# Patient Record
Sex: MALE | Race: WHITE | NOT HISPANIC OR LATINO | Employment: FULL TIME | URBAN - METROPOLITAN AREA
[De-identification: names, ages, dates, MRNs, and addresses within clinical notes are randomized per-mention and may not be internally consistent; named-entity substitution may affect disease eponyms.]

---

## 2017-06-26 ENCOUNTER — OFFICE VISIT (OUTPATIENT)
Dept: SPORTS MEDICINE | Facility: CLINIC | Age: 52
End: 2017-06-26

## 2017-06-26 VITALS
SYSTOLIC BLOOD PRESSURE: 124 MMHG | DIASTOLIC BLOOD PRESSURE: 82 MMHG | WEIGHT: 245.4 LBS | HEART RATE: 59 BPM | OXYGEN SATURATION: 96 % | BODY MASS INDEX: 31.49 KG/M2 | RESPIRATION RATE: 16 BRPM | HEIGHT: 74 IN

## 2017-06-26 DIAGNOSIS — F41.8 MIXED ANXIETY DEPRESSIVE DISORDER: ICD-10-CM

## 2017-06-26 DIAGNOSIS — E78.2 MIXED HYPERLIPIDEMIA: ICD-10-CM

## 2017-06-26 DIAGNOSIS — I10 ESSENTIAL HYPERTENSION: Primary | ICD-10-CM

## 2017-06-26 PROCEDURE — 99204 OFFICE O/P NEW MOD 45 MIN: CPT | Performed by: FAMILY MEDICINE

## 2017-06-26 RX ORDER — MIRTAZAPINE 30 MG/1
30 TABLET, FILM COATED ORAL NIGHTLY
Qty: 90 TABLET | Refills: 1 | Status: CANCELLED | OUTPATIENT
Start: 2017-06-26

## 2017-06-26 RX ORDER — ATORVASTATIN CALCIUM 10 MG/1
10 TABLET, FILM COATED ORAL
COMMUNITY
Start: 2017-03-21 | End: 2017-08-07 | Stop reason: SDUPTHER

## 2017-06-26 RX ORDER — LISINOPRIL 20 MG/1
20 TABLET ORAL DAILY
Qty: 90 TABLET | Refills: 1 | Status: CANCELLED | OUTPATIENT
Start: 2017-06-26

## 2017-06-26 RX ORDER — ASPIRIN 81 MG/1
81 TABLET, CHEWABLE ORAL
COMMUNITY
Start: 2017-03-21 | End: 2017-08-07 | Stop reason: SDUPTHER

## 2017-06-26 RX ORDER — ASPIRIN 81 MG/1
81 TABLET, CHEWABLE ORAL DAILY
Qty: 90 TABLET | Refills: 1 | Status: CANCELLED | OUTPATIENT
Start: 2017-06-26

## 2017-06-26 RX ORDER — LISINOPRIL 20 MG/1
20 TABLET ORAL
COMMUNITY
Start: 2017-03-21 | End: 2017-06-26 | Stop reason: SDUPTHER

## 2017-06-26 RX ORDER — AMLODIPINE BESYLATE 10 MG/1
10 TABLET ORAL DAILY
Qty: 90 TABLET | Refills: 1 | Status: CANCELLED | OUTPATIENT
Start: 2017-06-26

## 2017-06-26 RX ORDER — AMLODIPINE BESYLATE 10 MG/1
10 TABLET ORAL DAILY
COMMUNITY
End: 2017-08-07 | Stop reason: SDUPTHER

## 2017-06-26 RX ORDER — ATORVASTATIN CALCIUM 10 MG/1
10 TABLET, FILM COATED ORAL DAILY
Qty: 90 TABLET | Refills: 1 | Status: CANCELLED | OUTPATIENT
Start: 2017-06-26

## 2017-06-26 RX ORDER — MIRTAZAPINE 30 MG/1
30 TABLET, FILM COATED ORAL NIGHTLY
COMMUNITY
End: 2017-08-07 | Stop reason: SDUPTHER

## 2017-06-26 RX ORDER — MULTIVIT-MIN/IRON FUM/FOLIC AC 7.5 MG-4
1 TABLET ORAL
COMMUNITY
End: 2018-10-10 | Stop reason: SDUPTHER

## 2017-06-26 NOTE — PROGRESS NOTES
"Flako is a 52 y.o. year old male    Chief Complaint   Patient presents with   • Establish Care     Needs a new pcp for med refill. HTN, HLD, anxiety       History of Present Illness   HPI Comments: HTN: Stable.  Had a scare back in the spring for his blood pressure shot up acutely.  Admits that he was not as compliant with his medications.  Had other medications added to his blood pressure regimen and he has been well controlled since.  Checks his blood pressure at least 3 times a week and is similar to today's readings.  Needs refills.    HLD: Persistent.  Requests refill on atorvastatin.    Anxiety, sleep disorder: Takes mirtazapine 15 mg daily at bedtime.  Also concerned about concentration and difficulty keeping focus at work.  He is a  of the [employer] in [place].  Would like to have formal psychological testing done.       I have reviewed the patient's medical history in detail and updated the computerized patient record.    Review of Systems   Constitutional: Negative for chills, fatigue and fever.   HENT: Negative for postnasal drip and sore throat.    Eyes: Negative for pain.   Respiratory: Negative for cough, chest tightness and wheezing.    Cardiovascular: Negative for chest pain.   Gastrointestinal: Negative.    Musculoskeletal: Negative for myalgias.   Skin: Negative for rash.   Neurological: Negative for numbness and headaches.   Psychiatric/Behavioral: Positive for decreased concentration and sleep disturbance.   All other systems reviewed and are negative.      /82 (BP Location: Left arm, Patient Position: Sitting, Cuff Size: Adult)  Pulse 59  Resp 16  Ht 74\" (188 cm)  Wt 245 lb 6.4 oz (111 kg)  SpO2 96%  BMI 31.51 kg/m2     Physical Exam   Constitutional: He is oriented to person, place, and time. He appears well-developed and well-nourished.   HENT:   Head: Normocephalic and atraumatic.   Right Ear: External ear normal.   Left Ear: External ear normal.   Nose: Nose normal. "   Mouth/Throat: Oropharynx is clear and moist.   Eyes: EOM are normal.   Neck: Normal range of motion.   Cardiovascular: Normal rate and regular rhythm.    Pulmonary/Chest: Effort normal and breath sounds normal.   Neurological: He is alert and oriented to person, place, and time.   Skin: Skin is warm and dry. No rash noted.   Psychiatric: He has a normal mood and affect. His behavior is normal.   Nursing note and vitals reviewed.       Diagnoses and all orders for this visit:    Essential hypertension    Mixed hyperlipidemia    Mixed anxiety depressive disorder    Other orders  -     aspirin 81 MG chewable tablet; Chew 81 mg.  -     atorvastatin (LIPITOR) 10 MG tablet; Take 10 mg by mouth.  -     Multiple Vitamins-Minerals (MULTIVITAMIN WITH MINERALS) tablet; Take 1 tablet by mouth.  -     Discontinue: lisinopril (PRINIVIL,ZESTRIL) 20 MG tablet; Take 20 mg by mouth.  -     mirtazapine (REMERON) 30 MG tablet; Take 30 mg by mouth Every Night.  -     amLODIPine (NORVASC) 10 MG tablet; Take 10 mg by mouth Daily.  -     Cancel: mirtazapine (REMERON) 30 MG tablet; Take 1 tablet by mouth Every Night.  -     Cancel: lisinopril (PRINIVIL,ZESTRIL) 20 MG tablet; Take 1 tablet by mouth Daily.  -     Cancel: atorvastatin (LIPITOR) 10 MG tablet; Take 1 tablet by mouth Daily.  -     Cancel: aspirin 81 MG chewable tablet; Chew 1 tablet Daily.  -     Cancel: amLODIPine (NORVASC) 10 MG tablet; Take 1 tablet by mouth Daily.      Chronic medical conditions are stable.  Regards to his difficulty concentrating, contact information given for [facility] to discuss testing.  He states that he is switching to a mail order pharmacy and will call back with that information for the refills.    Prior to next visit, he'll come back for fasting labs to include: CMP, lipid panel, urinalysis with culture, CBC, free and total testosterone.

## 2017-08-07 ENCOUNTER — OFFICE VISIT (OUTPATIENT)
Dept: SPORTS MEDICINE | Facility: CLINIC | Age: 52
End: 2017-08-07

## 2017-08-07 VITALS
DIASTOLIC BLOOD PRESSURE: 90 MMHG | SYSTOLIC BLOOD PRESSURE: 140 MMHG | RESPIRATION RATE: 16 BRPM | BODY MASS INDEX: 29.85 KG/M2 | TEMPERATURE: 98.8 F | OXYGEN SATURATION: 94 % | HEIGHT: 74 IN | HEART RATE: 90 BPM | WEIGHT: 232.6 LBS

## 2017-08-07 DIAGNOSIS — F41.8 MIXED ANXIETY DEPRESSIVE DISORDER: ICD-10-CM

## 2017-08-07 DIAGNOSIS — I10 ESSENTIAL HYPERTENSION: ICD-10-CM

## 2017-08-07 DIAGNOSIS — E78.2 MIXED HYPERLIPIDEMIA: ICD-10-CM

## 2017-08-07 DIAGNOSIS — J18.9 CAP (COMMUNITY ACQUIRED PNEUMONIA): Primary | ICD-10-CM

## 2017-08-07 PROCEDURE — 99214 OFFICE O/P EST MOD 30 MIN: CPT | Performed by: FAMILY MEDICINE

## 2017-08-07 RX ORDER — ATORVASTATIN CALCIUM 10 MG/1
10 TABLET, FILM COATED ORAL DAILY
Qty: 90 TABLET | Refills: 1 | Status: SHIPPED | OUTPATIENT
Start: 2017-08-07 | End: 2018-10-10

## 2017-08-07 RX ORDER — MIRTAZAPINE 30 MG/1
30 TABLET, FILM COATED ORAL NIGHTLY
Qty: 90 TABLET | Refills: 1 | Status: SHIPPED | OUTPATIENT
Start: 2017-08-07 | End: 2018-01-16

## 2017-08-07 RX ORDER — AMLODIPINE BESYLATE 10 MG/1
10 TABLET ORAL DAILY
Qty: 90 TABLET | Refills: 1 | Status: SHIPPED | OUTPATIENT
Start: 2017-08-07 | End: 2018-02-12 | Stop reason: SDUPTHER

## 2017-08-07 RX ORDER — LEVOFLOXACIN 500 MG/1
500 TABLET, FILM COATED ORAL
COMMUNITY
Start: 2017-08-05 | End: 2017-08-15

## 2017-08-07 RX ORDER — LISINOPRIL 20 MG/1
20 TABLET ORAL DAILY
Qty: 90 TABLET | Refills: 1 | Status: SHIPPED | OUTPATIENT
Start: 2017-08-07 | End: 2017-11-10 | Stop reason: SDUPTHER

## 2017-08-07 RX ORDER — ALBUTEROL SULFATE 90 UG/1
AEROSOL, METERED RESPIRATORY (INHALATION)
Qty: 6.7 G | Refills: 0 | Status: SHIPPED | OUTPATIENT
Start: 2017-08-07 | End: 2018-01-16

## 2017-08-07 RX ORDER — ASPIRIN 81 MG/1
81 TABLET, CHEWABLE ORAL DAILY
Qty: 90 TABLET | Refills: 1 | Status: SHIPPED | OUTPATIENT
Start: 2017-08-07 | End: 2018-10-10

## 2017-08-07 RX ORDER — BACLOFEN 10 MG/1
10 TABLET ORAL
COMMUNITY
Start: 2017-08-05 | End: 2017-08-07 | Stop reason: HOSPADM

## 2017-08-07 RX ORDER — OMEPRAZOLE 20 MG/1
20 CAPSULE, DELAYED RELEASE ORAL
COMMUNITY
Start: 2017-08-05 | End: 2017-08-07 | Stop reason: HOSPADM

## 2017-08-07 NOTE — PROGRESS NOTES
"Flako is a 52 y.o. year old male    Chief Complaint   Patient presents with   • Follow-up     F/U from Immediate care. Was seen Saturday at [facility] in [place] and was told he had pneumonia.        History of Present Illness   HPI Comments: CAP: Diagnosed on Saturday at [facility] in [place].  I have reviewed that encounter.  He was also complaining of hiccups at that time.  Felt Feverish and Had a Mildly Productive Cough.  He Was Prescribed Levaquin, Baclofen, Prilosec.  Has Been Taking Medications and Symptoms Have Improved Somewhat.  He Had Some Chills Last Night but Feels like His Fever Curve Is Improving.  No Shortness of Breath or Difficulty Breathing.  Increase Mucus Production with His Cough.  No Longer Having Hiccups.  No Chronic Lung Conditions.  Does Not Smoke.    Also requests refill on other medications.  States that he uses mail order [facility] pharmacy.       I have reviewed the patient's medical, family, and social history in detail and updated the computerized patient record.    Review of Systems   Constitutional: Positive for chills and fever. Negative for fatigue.   HENT: Positive for congestion. Negative for postnasal drip and sore throat.    Eyes: Negative for pain.   Respiratory: Positive for cough. Negative for chest tightness and wheezing.    Cardiovascular: Negative for chest pain.   Gastrointestinal: Negative.    Musculoskeletal: Negative for myalgias.   Skin: Negative for rash.   Neurological: Negative for numbness and headaches.   Psychiatric/Behavioral: Negative.    All other systems reviewed and are negative.      /90 (BP Location: Left arm, Patient Position: Sitting, Cuff Size: Adult)  Pulse 90  Temp 98.8 °F (37.1 °C) (Oral)   Resp 16  Ht 74\" (188 cm)  Wt 232 lb 9.6 oz (106 kg)  SpO2 94%  BMI 29.86 kg/m2     Physical Exam   Constitutional: He is oriented to person, place, and time. He appears well-developed and well-nourished.   HENT:   Head: Normocephalic and " atraumatic.   Right Ear: External ear normal.   Left Ear: External ear normal.   Nose: Nose normal.   Mouth/Throat: Oropharynx is clear and moist.   Eyes: EOM are normal.   Neck: Normal range of motion.   Cardiovascular: Normal rate and regular rhythm.    Pulmonary/Chest: Effort normal. No accessory muscle usage. No respiratory distress. He has no wheezes. He has rhonchi in the left upper field and the left middle field.   Neurological: He is alert and oriented to person, place, and time.   Skin: Skin is warm and dry. No rash noted.   Psychiatric: He has a normal mood and affect. His behavior is normal.   Nursing note and vitals reviewed.       Diagnoses and all orders for this visit:    CAP (community acquired pneumonia)  -     albuterol (PROVENTIL HFA;VENTOLIN HFA) 108 (90 BASE) MCG/ACT inhaler; 1-2 puffs every 6-8 hrs as needed for wheezing or shortness of breath    Essential hypertension  -     amLODIPine (NORVASC) 10 MG tablet; Take 1 tablet by mouth Daily.  -     lisinopril (PRINIVIL,ZESTRIL) 20 MG tablet; Take 1 tablet by mouth Daily.  -     aspirin 81 MG chewable tablet; Chew 1 tablet Daily.    Mixed anxiety depressive disorder  -     mirtazapine (REMERON) 30 MG tablet; Take 1 tablet by mouth Every Night.    Mixed hyperlipidemia  -     atorvastatin (LIPITOR) 10 MG tablet; Take 1 tablet by mouth Daily.    Other orders  -     Discontinue: baclofen (LIORESAL) 10 MG tablet; Take 10 mg by mouth.  -     levoFLOXacin (LEVAQUIN) 500 MG tablet; Take 500 mg by mouth.  -     Discontinue: omeprazole (priLOSEC) 20 MG capsule; Take 20 mg by mouth.    1.  Complete antibiotics.  Can stop other medications that were previously prescribed.  Additionally, bronchodilator added to help with resolution of symptoms.  2-4.  Refill medications as prescribed.

## 2017-09-19 DIAGNOSIS — Z00.00 PREVENTATIVE HEALTH CARE: Primary | ICD-10-CM

## 2017-11-10 DIAGNOSIS — I10 ESSENTIAL HYPERTENSION: ICD-10-CM

## 2017-11-10 RX ORDER — LISINOPRIL 20 MG/1
20 TABLET ORAL DAILY
Qty: 30 TABLET | Refills: 0 | Status: SHIPPED | OUTPATIENT
Start: 2017-11-10 | End: 2017-12-20 | Stop reason: SDUPTHER

## 2017-12-20 DIAGNOSIS — I10 ESSENTIAL HYPERTENSION: ICD-10-CM

## 2017-12-20 RX ORDER — LISINOPRIL 20 MG/1
TABLET ORAL
Qty: 30 TABLET | Refills: 0 | Status: SHIPPED | OUTPATIENT
Start: 2017-12-20 | End: 2018-01-08 | Stop reason: SDUPTHER

## 2017-12-22 ENCOUNTER — OFFICE VISIT (OUTPATIENT)
Dept: SPORTS MEDICINE | Facility: CLINIC | Age: 52
End: 2017-12-22

## 2017-12-22 VITALS
SYSTOLIC BLOOD PRESSURE: 156 MMHG | RESPIRATION RATE: 16 BRPM | DIASTOLIC BLOOD PRESSURE: 110 MMHG | BODY MASS INDEX: 31.32 KG/M2 | HEART RATE: 69 BPM | WEIGHT: 244 LBS | OXYGEN SATURATION: 97 % | HEIGHT: 74 IN | TEMPERATURE: 98.3 F

## 2017-12-22 DIAGNOSIS — I10 ESSENTIAL HYPERTENSION: ICD-10-CM

## 2017-12-22 DIAGNOSIS — R10.84 GENERALIZED ABDOMINAL PAIN: Primary | ICD-10-CM

## 2017-12-22 DIAGNOSIS — Z87.19 HISTORY OF DIVERTICULOSIS: ICD-10-CM

## 2017-12-22 LAB
ALBUMIN SERPL-MCNC: 4.1 G/DL (ref 3.5–5.2)
ALBUMIN/GLOB SERPL: 1.3 G/DL
ALP SERPL-CCNC: 64 U/L (ref 39–117)
ALT SERPL-CCNC: 27 U/L (ref 1–41)
AST SERPL-CCNC: 14 U/L (ref 1–40)
BASOPHILS # BLD AUTO: 0.04 10*3/MM3 (ref 0–0.2)
BASOPHILS NFR BLD AUTO: 0.5 % (ref 0–1.5)
BILIRUB SERPL-MCNC: 0.4 MG/DL (ref 0.1–1.2)
BUN SERPL-MCNC: 15 MG/DL (ref 6–20)
BUN/CREAT SERPL: 16.3 (ref 7–25)
CALCIUM SERPL-MCNC: 9.6 MG/DL (ref 8.6–10.5)
CHLORIDE SERPL-SCNC: 103 MMOL/L (ref 98–107)
CO2 SERPL-SCNC: 28.1 MMOL/L (ref 22–29)
CREAT SERPL-MCNC: 0.92 MG/DL (ref 0.76–1.27)
EOSINOPHIL # BLD AUTO: 0.77 10*3/MM3 (ref 0–0.7)
EOSINOPHIL NFR BLD AUTO: 9.5 % (ref 0.3–6.2)
ERYTHROCYTE [DISTWIDTH] IN BLOOD BY AUTOMATED COUNT: 13.4 % (ref 11.5–14.5)
GLOBULIN SER CALC-MCNC: 3.1 GM/DL
GLUCOSE SERPL-MCNC: 80 MG/DL (ref 65–99)
HCT VFR BLD AUTO: 46 % (ref 40.4–52.2)
HGB BLD-MCNC: 15.1 G/DL (ref 13.7–17.6)
IMM GRANULOCYTES # BLD: 0.02 10*3/MM3 (ref 0–0.03)
IMM GRANULOCYTES NFR BLD: 0.2 % (ref 0–0.5)
LIPASE SERPL-CCNC: 52 U/L (ref 13–60)
LYMPHOCYTES # BLD AUTO: 2.24 10*3/MM3 (ref 0.9–4.8)
LYMPHOCYTES NFR BLD AUTO: 27.7 % (ref 19.6–45.3)
MCH RBC QN AUTO: 28.3 PG (ref 27–32.7)
MCHC RBC AUTO-ENTMCNC: 32.8 G/DL (ref 32.6–36.4)
MCV RBC AUTO: 86.3 FL (ref 79.8–96.2)
MONOCYTES # BLD AUTO: 0.57 10*3/MM3 (ref 0.2–1.2)
MONOCYTES NFR BLD AUTO: 7.1 % (ref 5–12)
NEUTROPHILS # BLD AUTO: 4.44 10*3/MM3 (ref 1.9–8.1)
NEUTROPHILS NFR BLD AUTO: 55 % (ref 42.7–76)
PLATELET # BLD AUTO: 255 10*3/MM3 (ref 140–500)
POTASSIUM SERPL-SCNC: 4.2 MMOL/L (ref 3.5–5.2)
PROT SERPL-MCNC: 7.2 G/DL (ref 6–8.5)
RBC # BLD AUTO: 5.33 10*6/MM3 (ref 4.6–6)
SODIUM SERPL-SCNC: 145 MMOL/L (ref 136–145)
WBC # BLD AUTO: 8.08 10*3/MM3 (ref 4.5–10.7)

## 2017-12-22 PROCEDURE — 99214 OFFICE O/P EST MOD 30 MIN: CPT | Performed by: FAMILY MEDICINE

## 2017-12-22 RX ORDER — SULFAMETHOXAZOLE AND TRIMETHOPRIM 800; 160 MG/1; MG/1
1 TABLET ORAL 2 TIMES DAILY
Qty: 20 TABLET | Refills: 0 | Status: SHIPPED | OUTPATIENT
Start: 2017-12-22 | End: 2018-01-01

## 2017-12-22 NOTE — PROGRESS NOTES
"Flako is a 52 y.o. year old male    Chief Complaint   Patient presents with   • Vomiting   • Diverticulitis     can not seem to digest food / vomitting daily /        History of Present Illness   Vomiting    Associated symptoms include a fever (subjective). Pertinent negatives include no chest pain, chills, coughing, headaches or myalgias.   Diverticulitis   This is a recurrent problem. The current episode started 1 to 4 weeks ago. The problem occurs constantly. The problem has been unchanged. Associated symptoms include a fever (subjective), nausea and vomiting. Pertinent negatives include no chest pain, chills, coughing, fatigue, headaches, myalgias, numbness, rash or sore throat.     States he was Dx with diverticulosis approx 4 yrs ago. Ongoing problem. Admits he does not comply with previously recommended diet. Subjective fever. Has thinks he has had at least 4 episodes of same this year.    HTN: hasn't taken Rx today.    I have reviewed the patient's medical, family, and social history in detail and updated the computerized patient record.    Review of Systems   Constitutional: Positive for fever (subjective). Negative for chills and fatigue.   HENT: Negative for postnasal drip and sore throat.    Eyes: Negative for pain.   Respiratory: Negative for cough, chest tightness and wheezing.    Cardiovascular: Negative for chest pain.   Gastrointestinal: Positive for nausea and vomiting.   Musculoskeletal: Negative for myalgias.   Skin: Negative for rash.   Neurological: Negative for numbness and headaches.   Psychiatric/Behavioral: Negative.    All other systems reviewed and are negative.      BP (!) 156/110 (BP Location: Right arm, Patient Position: Sitting, Cuff Size: Large Adult)  Pulse 69  Temp 98.3 °F (36.8 °C) (Oral)   Resp 16  Ht 188 cm (74\")  Wt 111 kg (244 lb)  SpO2 97%  BMI 31.33 kg/m2     Physical Exam   Constitutional: He is oriented to person, place, and time. He appears well-developed and " well-nourished.   HENT:   Head: Normocephalic and atraumatic.   Right Ear: External ear normal.   Left Ear: External ear normal.   Nose: Nose normal.   Mouth/Throat: Oropharynx is clear and moist.   Eyes: EOM are normal.   Neck: Normal range of motion.   Cardiovascular: Normal rate and regular rhythm.    Pulmonary/Chest: Effort normal and breath sounds normal.   Abdominal: Soft. Normal appearance and bowel sounds are normal. There is tenderness in the left lower quadrant. There is no rigidity, no rebound and no guarding.   Neurological: He is alert and oriented to person, place, and time.   Skin: Skin is warm and dry. No rash noted.   Psychiatric: He has a normal mood and affect. His behavior is normal.   Nursing note and vitals reviewed.       1. Generalized abdominal pain    - CBC & Differential  - Comprehensive Metabolic Panel  - Lipase  - sulfamethoxazole-trimethoprim (BACTRIM DS) 800-160 MG per tablet; Take 1 tablet by mouth 2 (Two) Times a Day for 10 days.  Dispense: 20 tablet; Refill: 0    2. History of diverticulosis    - CBC & Differential  - Comprehensive Metabolic Panel  - Lipase  - sulfamethoxazole-trimethoprim (BACTRIM DS) 800-160 MG per tablet; Take 1 tablet by mouth 2 (Two) Times a Day for 10 days.  Dispense: 20 tablet; Refill: 0    3. Essential hypertension      1, 2. Mild, persistent symptoms. Afebrile, no acute abdominal findings. Blood work as ordered. Based on this, can try round of Bactrim as monotherapy. May need to f/up with gen surg for further mgmt.   3. Asymptomatic. Has been WNL at past visits when he has taken Rx. Instructed to continue current regimen.    EMR Dragon/Transcription disclaimer:    Much of this encounter note is an electronic transcription/translation of spoken language to printed text.  The electronic translation of spoken language may permit erroneous, or at times, nonsensical words or phrases to be inadvertently transcribed.  Although I have reviewed the note for such  errors some may still exist.

## 2017-12-28 ENCOUNTER — TELEPHONE (OUTPATIENT)
Dept: SPORTS MEDICINE | Facility: CLINIC | Age: 52
End: 2017-12-28

## 2018-01-01 ENCOUNTER — APPOINTMENT (OUTPATIENT)
Dept: CT IMAGING | Facility: HOSPITAL | Age: 53
End: 2018-01-01

## 2018-01-01 ENCOUNTER — HOSPITAL ENCOUNTER (EMERGENCY)
Facility: HOSPITAL | Age: 53
Discharge: HOME | End: 2018-01-01
Attending: EMERGENCY MEDICINE | Admitting: EMERGENCY MEDICINE

## 2018-01-01 VITALS
WEIGHT: 235 LBS | HEIGHT: 74 IN | HEART RATE: 61 BPM | RESPIRATION RATE: 15 BRPM | OXYGEN SATURATION: 94 % | DIASTOLIC BLOOD PRESSURE: 58 MMHG | BODY MASS INDEX: 30.16 KG/M2 | SYSTOLIC BLOOD PRESSURE: 109 MMHG | TEMPERATURE: 97.1 F

## 2018-01-01 DIAGNOSIS — K57.12 DIVERTICULITIS OF SMALL INTESTINE WITHOUT PERFORATION OR ABSCESS WITHOUT BLEEDING: Primary | ICD-10-CM

## 2018-01-01 LAB
ALBUMIN SERPL-MCNC: 4.6 G/DL (ref 3.5–5.2)
ALBUMIN/GLOB SERPL: 1.4 G/DL
ALP SERPL-CCNC: 69 U/L (ref 39–117)
ALT SERPL W P-5'-P-CCNC: 21 U/L (ref 1–41)
ANION GAP SERPL CALCULATED.3IONS-SCNC: 11.2 MMOL/L
AST SERPL-CCNC: 16 U/L (ref 1–40)
BASOPHILS # BLD AUTO: 0.02 10*3/MM3 (ref 0–0.2)
BASOPHILS NFR BLD AUTO: 0.2 % (ref 0–1.5)
BILIRUB SERPL-MCNC: 0.9 MG/DL (ref 0.1–1.2)
BILIRUB UR QL STRIP: NEGATIVE
BUN BLD-MCNC: 15 MG/DL (ref 6–20)
BUN/CREAT SERPL: 12.4 (ref 7–25)
CALCIUM SPEC-SCNC: 8.9 MG/DL (ref 8.6–10.5)
CHLORIDE SERPL-SCNC: 96 MMOL/L (ref 98–107)
CLARITY UR: CLEAR
CO2 SERPL-SCNC: 27.8 MMOL/L (ref 22–29)
COLOR UR: ABNORMAL
CREAT BLD-MCNC: 1.21 MG/DL (ref 0.76–1.27)
DEPRECATED RDW RBC AUTO: 43.5 FL (ref 37–54)
EOSINOPHIL # BLD AUTO: 0.4 10*3/MM3 (ref 0–0.7)
EOSINOPHIL NFR BLD AUTO: 4.1 % (ref 0.3–6.2)
ERYTHROCYTE [DISTWIDTH] IN BLOOD BY AUTOMATED COUNT: 13.6 % (ref 11.5–14.5)
GFR SERPL CREATININE-BSD FRML MDRD: 63 ML/MIN/1.73
GLOBULIN UR ELPH-MCNC: 3.4 GM/DL
GLUCOSE BLD-MCNC: 92 MG/DL (ref 65–99)
GLUCOSE UR STRIP-MCNC: NEGATIVE MG/DL
HCT VFR BLD AUTO: 46.3 % (ref 40.4–52.2)
HGB BLD-MCNC: 14.8 G/DL (ref 13.7–17.6)
HGB UR QL STRIP.AUTO: NEGATIVE
HOLD SPECIMEN: NORMAL
HOLD SPECIMEN: NORMAL
IMM GRANULOCYTES # BLD: 0.02 10*3/MM3 (ref 0–0.03)
IMM GRANULOCYTES NFR BLD: 0.2 % (ref 0–0.5)
KETONES UR QL STRIP: NEGATIVE
LEUKOCYTE ESTERASE UR QL STRIP.AUTO: NEGATIVE
LIPASE SERPL-CCNC: 87 U/L (ref 13–60)
LYMPHOCYTES # BLD AUTO: 1.85 10*3/MM3 (ref 0.9–4.8)
LYMPHOCYTES NFR BLD AUTO: 18.9 % (ref 19.6–45.3)
MCH RBC QN AUTO: 27.8 PG (ref 27–32.7)
MCHC RBC AUTO-ENTMCNC: 32 G/DL (ref 32.6–36.4)
MCV RBC AUTO: 87 FL (ref 79.8–96.2)
MONOCYTES # BLD AUTO: 1.04 10*3/MM3 (ref 0.2–1.2)
MONOCYTES NFR BLD AUTO: 10.6 % (ref 5–12)
NEUTROPHILS # BLD AUTO: 6.48 10*3/MM3 (ref 1.9–8.1)
NEUTROPHILS NFR BLD AUTO: 66 % (ref 42.7–76)
NITRITE UR QL STRIP: NEGATIVE
NRBC BLD MANUAL-RTO: 0 /100 WBC (ref 0–0)
PH UR STRIP.AUTO: 7.5 [PH] (ref 5–8)
PLATELET # BLD AUTO: 242 10*3/MM3 (ref 140–500)
PMV BLD AUTO: 10.1 FL (ref 6–12)
POTASSIUM BLD-SCNC: 4.1 MMOL/L (ref 3.5–5.2)
PROT SERPL-MCNC: 8 G/DL (ref 6–8.5)
PROT UR QL STRIP: ABNORMAL
RBC # BLD AUTO: 5.32 10*6/MM3 (ref 4.6–6)
SODIUM BLD-SCNC: 135 MMOL/L (ref 136–145)
SP GR UR STRIP: 1.03 (ref 1–1.03)
UROBILINOGEN UR QL STRIP: ABNORMAL
WBC NRBC COR # BLD: 9.81 10*3/MM3 (ref 4.5–10.7)
WHOLE BLOOD HOLD SPECIMEN: NORMAL
WHOLE BLOOD HOLD SPECIMEN: NORMAL

## 2018-01-01 PROCEDURE — 99284 EMERGENCY DEPT VISIT MOD MDM: CPT

## 2018-01-01 PROCEDURE — 25010000002 CEFTRIAXONE PER 250 MG: Performed by: EMERGENCY MEDICINE

## 2018-01-01 PROCEDURE — 0 IOPAMIDOL 61 % SOLUTION: Performed by: EMERGENCY MEDICINE

## 2018-01-01 PROCEDURE — 80053 COMPREHEN METABOLIC PANEL: CPT | Performed by: EMERGENCY MEDICINE

## 2018-01-01 PROCEDURE — 83690 ASSAY OF LIPASE: CPT | Performed by: EMERGENCY MEDICINE

## 2018-01-01 PROCEDURE — 25010000002 ONDANSETRON PER 1 MG: Performed by: EMERGENCY MEDICINE

## 2018-01-01 PROCEDURE — 25010000002 HYDROMORPHONE PER 4 MG: Performed by: EMERGENCY MEDICINE

## 2018-01-01 PROCEDURE — 96375 TX/PRO/DX INJ NEW DRUG ADDON: CPT

## 2018-01-01 PROCEDURE — 74177 CT ABD & PELVIS W/CONTRAST: CPT

## 2018-01-01 PROCEDURE — 36415 COLL VENOUS BLD VENIPUNCTURE: CPT

## 2018-01-01 PROCEDURE — 85025 COMPLETE CBC W/AUTO DIFF WBC: CPT | Performed by: EMERGENCY MEDICINE

## 2018-01-01 PROCEDURE — 81003 URINALYSIS AUTO W/O SCOPE: CPT | Performed by: EMERGENCY MEDICINE

## 2018-01-01 PROCEDURE — 96361 HYDRATE IV INFUSION ADD-ON: CPT

## 2018-01-01 PROCEDURE — 96365 THER/PROPH/DIAG IV INF INIT: CPT

## 2018-01-01 RX ORDER — HYDROCODONE BITARTRATE AND ACETAMINOPHEN 5; 325 MG/1; MG/1
1 TABLET ORAL EVERY 6 HOURS PRN
Qty: 12 TABLET | Refills: 0 | Status: SHIPPED | OUTPATIENT
Start: 2018-01-01 | End: 2018-01-08

## 2018-01-01 RX ORDER — METRONIDAZOLE 500 MG/1
500 TABLET ORAL ONCE
Status: COMPLETED | OUTPATIENT
Start: 2018-01-01 | End: 2018-01-01

## 2018-01-01 RX ORDER — CEPHALEXIN 500 MG/1
500 CAPSULE ORAL 3 TIMES DAILY
Qty: 21 CAPSULE | Refills: 0 | Status: SHIPPED | OUTPATIENT
Start: 2018-01-01 | End: 2018-01-08

## 2018-01-01 RX ORDER — CEFTRIAXONE SODIUM 1 G/50ML
1 INJECTION, SOLUTION INTRAVENOUS ONCE
Status: COMPLETED | OUTPATIENT
Start: 2018-01-01 | End: 2018-01-01

## 2018-01-01 RX ORDER — ONDANSETRON 2 MG/ML
4 INJECTION INTRAMUSCULAR; INTRAVENOUS ONCE
Status: COMPLETED | OUTPATIENT
Start: 2018-01-01 | End: 2018-01-01

## 2018-01-01 RX ORDER — METRONIDAZOLE 500 MG/1
500 TABLET ORAL 3 TIMES DAILY
Qty: 21 TABLET | Refills: 0 | Status: SHIPPED | OUTPATIENT
Start: 2018-01-01 | End: 2018-01-08

## 2018-01-01 RX ORDER — ONDANSETRON 4 MG/1
4 TABLET, FILM COATED ORAL EVERY 6 HOURS PRN
Qty: 12 TABLET | Refills: 0 | Status: SHIPPED | OUTPATIENT
Start: 2018-01-01 | End: 2018-01-08

## 2018-01-01 RX ORDER — SODIUM CHLORIDE 0.9 % (FLUSH) 0.9 %
10 SYRINGE (ML) INJECTION AS NEEDED
Status: DISCONTINUED | OUTPATIENT
Start: 2018-01-01 | End: 2018-01-01 | Stop reason: HOSPADM

## 2018-01-01 RX ADMIN — CEFTRIAXONE SODIUM 1 G: 1 INJECTION, SOLUTION INTRAVENOUS at 11:25

## 2018-01-01 RX ADMIN — ONDANSETRON 4 MG: 2 INJECTION INTRAMUSCULAR; INTRAVENOUS at 10:21

## 2018-01-01 RX ADMIN — IOPAMIDOL 85 ML: 612 INJECTION, SOLUTION INTRAVENOUS at 10:30

## 2018-01-01 RX ADMIN — METRONIDAZOLE 500 MG: 500 TABLET, FILM COATED ORAL at 12:25

## 2018-01-01 RX ADMIN — HYDROMORPHONE HYDROCHLORIDE 1 MG: 1 INJECTION, SOLUTION INTRAMUSCULAR; INTRAVENOUS; SUBCUTANEOUS at 10:21

## 2018-01-01 RX ADMIN — SODIUM CHLORIDE 1000 ML: 9 INJECTION, SOLUTION INTRAVENOUS at 10:23

## 2018-01-01 NOTE — DISCHARGE INSTRUCTIONS
Take medications as prescribed.  Follow-up with your doctor in 3-4 days if symptoms persist.  Return to the emergency department for worsening pain, fever, persistent vomiting, or other concern.

## 2018-01-01 NOTE — ED PROVIDER NOTES
" EMERGENCY DEPARTMENT ENCOUNTER    CHIEF COMPLAINT  Chief Complaint: Lower abd pain  History given by: Pt  History limited by: Nothing  Room Number: 17/17  PMD: SIMRAN Rodriguez Jr., DO      HPI:  Pt is a 52 y.o. male with a hx of diverticulitis who presents complaining of intermittent lower abd pain onset about two weeks ago worsening two days ago. He also complains of nausea, vomiting, bloody stool, and subjective fever but denies any other symptoms at this time. Pt was seen by his PCP 12/22/2017 for vomiting and abd pain. Pt was given a prescription for bactrim at that time.    Duration:  About two weeks  Onset: gradual  Timing: intermittent  Location: lower abd  Radiation: none  Quality: \"pain\"  Intensity/Severity: moderate  Progression: unchanged  Associated Symptoms:  nausea, vomiting, bloody stool, and fever  Aggravating Factors: none  Alleviating Factors: none  Previous Episodes: none  Treatment before arrival: Pt received no treatment PTA.    PAST MEDICAL HISTORY  Active Ambulatory Problems     Diagnosis Date Noted   • Mixed anxiety depressive disorder 12/11/2012   • Mixed hyperlipidemia 06/26/2017   • Essential hypertension 06/26/2017     Resolved Ambulatory Problems     Diagnosis Date Noted   • No Resolved Ambulatory Problems     Past Medical History:   Diagnosis Date   • Depression    • Diverticulitis    • Hyperlipidemia    • Hypertension        PAST SURGICAL HISTORY  History reviewed. No pertinent surgical history.    FAMILY HISTORY  Family History   Problem Relation Age of Onset   • No Known Problems Mother    • Stroke Father        SOCIAL HISTORY  Social History     Social History   • Marital status: Single     Spouse name: N/A   • Number of children: N/A   • Years of education: N/A     Occupational History   • Not on file.     Social History Main Topics   • Smoking status: Never Smoker   • Smokeless tobacco: Never Used   • Alcohol use No   • Drug use: No   • Sexual activity: Defer     Other Topics " Concern   • Not on file     Social History Narrative       ALLERGIES  Review of patient's allergies indicates no known allergies.    REVIEW OF SYSTEMS  Review of Systems   Constitutional: Positive for fever (subjective). Negative for activity change and appetite change.   HENT: Negative for congestion and sore throat.    Eyes: Negative.    Respiratory: Negative for cough and shortness of breath.    Cardiovascular: Negative for chest pain and leg swelling.   Gastrointestinal: Positive for abdominal pain (lower), blood in stool, nausea and vomiting. Negative for diarrhea.   Endocrine: Negative.    Genitourinary: Negative for decreased urine volume and dysuria.   Musculoskeletal: Negative for neck pain.   Skin: Negative for rash and wound.   Allergic/Immunologic: Negative.    Neurological: Negative for weakness, numbness and headaches.   Hematological: Negative.    Psychiatric/Behavioral: Negative.    All other systems reviewed and are negative.      PHYSICAL EXAM  ED Triage Vitals   Temp Heart Rate Resp BP SpO2   01/01/18 0913 01/01/18 0913 01/01/18 0913 01/01/18 0939 01/01/18 0913   95.9 °F (35.5 °C) 98 16 133/78 100 %      Temp src Heart Rate Source Patient Position BP Location FiO2 (%)   01/01/18 0913 -- 01/01/18 0939 01/01/18 0939 --   Tympanic  Lying Right arm        Physical Exam   Constitutional: He is oriented to person, place, and time and well-developed, well-nourished, and in no distress.   HENT:   Head: Normocephalic and atraumatic.   Eyes: EOM are normal. Pupils are equal, round, and reactive to light.   Neck: Normal range of motion. Neck supple.   Cardiovascular: Normal rate, regular rhythm and normal heart sounds.    Pulmonary/Chest: Effort normal and breath sounds normal. No respiratory distress.   Abdominal: Soft. There is tenderness (mild, lower). There is no rebound, no guarding and no CVA tenderness.   Musculoskeletal: Normal range of motion. He exhibits no edema.   Neurological: He is alert and  oriented to person, place, and time. He has normal sensation and normal strength.   Skin: Skin is warm and dry.   Psychiatric: Mood and affect normal.   Nursing note and vitals reviewed.      LAB RESULTS  Lab Results (last 24 hours)     Procedure Component Value Units Date/Time    Urinalysis With / Culture If Indicated - Urine, Clean Catch [[id number]]  (Abnormal) Collected:  01/01/18 0948    Specimen:  Urine from Urine, Clean Catch Updated:  01/01/18 1001     Color, UA Dark Yellow (A)     Appearance, UA Clear     pH, UA 7.5     Specific Gravity, UA 1.026     Glucose, UA Negative     Ketones, UA Negative     Bilirubin, UA Negative     Blood, UA Negative     Protein, UA Trace (A)     Leuk Esterase, UA Negative     Nitrite, UA Negative     Urobilinogen, UA 1.0 E.U./dL    Narrative:       Urine microscopic not indicated.    CBC & Differential [[id number]] Collected:  01/01/18 0949    Specimen:  Blood Updated:  01/01/18 1001    Narrative:       The following orders were created for panel order CBC & Differential.  Procedure                               Abnormality         Status                     ---------                               -----------         ------                     CBC Auto Differential[[id number]]        Abnormal            Final result                 Please view results for these tests on the individual orders.    Comprehensive Metabolic Panel [[id number]]  (Abnormal) Collected:  01/01/18 0949    Specimen:  Blood Updated:  01/01/18 1014     Glucose 92 mg/dL      BUN 15 mg/dL      Creatinine 1.21 mg/dL      Sodium 135 (L) mmol/L      Potassium 4.1 mmol/L      Chloride 96 (L) mmol/L      CO2 27.8 mmol/L      Calcium 8.9 mg/dL      Total Protein 8.0 g/dL      Albumin 4.60 g/dL      ALT (SGPT) 21 U/L      AST (SGOT) 16 U/L      Alkaline Phosphatase 69 U/L      Total Bilirubin 0.9 mg/dL      eGFR Non African Amer 63 mL/min/1.73      Globulin 3.4 gm/dL      A/G Ratio 1.4 g/dL      BUN/Creatinine Ratio 12.4      Anion Gap 11.2 mmol/L     Lipase [[id number]]  (Abnormal) Collected:  01/01/18 0949    Specimen:  Blood Updated:  01/01/18 1014     Lipase 87 (H) U/L     CBC Auto Differential [[id number]]  (Abnormal) Collected:  01/01/18 0949    Specimen:  Blood Updated:  01/01/18 1001     WBC 9.81 10*3/mm3      RBC 5.32 10*6/mm3      Hemoglobin 14.8 g/dL      Hematocrit 46.3 %      MCV 87.0 fL      MCH 27.8 pg      MCHC 32.0 (L) g/dL      RDW 13.6 %      RDW-SD 43.5 fl      MPV 10.1 fL      Platelets 242 10*3/mm3      Neutrophil % 66.0 %      Lymphocyte % 18.9 (L) %      Monocyte % 10.6 %      Eosinophil % 4.1 %      Basophil % 0.2 %      Immature Grans % 0.2 %      Neutrophils, Absolute 6.48 10*3/mm3      Lymphocytes, Absolute 1.85 10*3/mm3      Monocytes, Absolute 1.04 10*3/mm3      Eosinophils, Absolute 0.40 10*3/mm3      Basophils, Absolute 0.02 10*3/mm3      Immature Grans, Absolute 0.02 10*3/mm3      nRBC 0.0 /100 WBC           I ordered the above labs and reviewed the results    RADIOLOGY  CT Abdomen Pelvis With Contrast   Final Result           1. Acute jejunal diverticulitis with wall thickening, follow-up   recommended.   2. No obstructive uropathy.       Discussed by telephone with Dr. Webb at time of interpretation, 1026,   01/01/2018       This report was finalized on 1/1/2018 10:54 AM by Dr. Ayo Meraz MD.               I ordered the above noted radiological studies. Interpreted by radiologist. Discussed with radiologist (Dr. Meraz). Reviewed by me in PACS.       PROCEDURES  Procedures      PROGRESS AND CONSULTS  ED Course     0923 Ordered CBC, UA, Lipase, and CMP for further evaluation    1013 Ordered CT Abd/Pel for further evaluation. Ordered 4mg zofran and 1mg dilaudid for symptom relief    1059 Discussed pt with Dr. Meraz (radiologist) who informs me CT results which show diverticulosis of the sigmoid colon and acute jejunal diverticulitis but no abscess.    1102 Ordered 500mg flagyl and 1g  rocephin    1104 BP- 138/85 HR- 60 Temp- 95.9 °F (35.5 °C) (Tympanic) O2 sat- 92%. Rechecked the patient who is in NAD and is resting comfortably. Informed pt of CT results which show acute jejunal diverticulitis wall thickening and no obstruction. Will discharge after flagyl and rocephin. Will discharge with pain medication, zofran, flagyl, and keflex. Pt understands and agrees with the plan. All questions answered.    1148 BP- 124/69 HR- 53 Temp- 95.9 °F (35.5 °C) (Tympanic) O2 sat- 95%. Rechecked the patient who is in NAD and is resting comfortably.       MEDICAL DECISION MAKING  Results were reviewed/discussed with the patient and they were also made aware of online access. Pt also made aware that some labs, such as cultures, will not be resulted during ER visit and follow up with PMD is necessary.     MDM  Number of Diagnoses or Management Options  Diverticulitis of small intestine without perforation or abscess without bleeding:   Diagnosis management comments: CT scan showed diverticulitis of the jejunum.  There was no abscess or perforation.  Patient was afebrile with a normal white blood cell count.  He was well-appearing.  Patient was given IV Rocephin and oral Flagyl.  He will be treated as an outpatient and discharged with prescriptions for Flagyl, Keflex, Zofran, and Norco.  Patient was advised to follow-up with his primary care doctor the next several days.       Amount and/or Complexity of Data Reviewed  Clinical lab tests: ordered and reviewed (WBC - 9.81, Lipase - 87, Sodium - 135)  Tests in the radiology section of CPT®: ordered and reviewed (CT Abd/Pel show diverticulosis of the sigmoid colon and acute jejunal diverticulitis but no abscess.)  Discussion of test results with the performing providers: yes (Dr. Meraz (radiologist))  Decide to obtain previous medical records or to obtain history from someone other than the patient: yes  Review and summarize past medical records: yes (Pt was seen by  his PCP 12/22/2017 for vomiting and abd pain. Pt was given a prescription for bactrim at that time.)  Independent visualization of images, tracings, or specimens: yes    Patient Progress  Patient progress: stable         DIAGNOSIS  Final diagnoses:   Diverticulitis of small intestine without perforation or abscess without bleeding       DISPOSITION  DISCHARGE    Patient discharged in stable condition.    Reviewed implications of results, diagnosis, meds, responsibility to follow up, warning signs and symptoms of possible worsening, potential complications and reasons to return to ER, including new or worsening symptoms.    Patient/Family voiced understanding of above instructions.    Discussed plan for discharge, as there is no emergent indication for admission.  Pt/family is agreeable and understands need for follow up and repeat testing.  Pt is aware that discharge does not mean that nothing is wrong but it indicates no emergency is present that requires admission and they must continue care with follow-up as given below or physician of their choice.     FOLLOW-UP  Akash Rodriguez Jr., DO  [address]  [place] [address]  [phone]    Call in 3 days  If symptoms persist         Medication List      New Prescriptions          cephalexin 500 MG capsule   Commonly known as:  KEFLEX   Take 1 capsule by mouth 3 (Three) Times a Day.       HYDROcodone-acetaminophen 5-325 MG per tablet   Commonly known as:  NORCO   Take 1 tablet by mouth Every 6 (Six) Hours As Needed for Moderate Pain .       metroNIDAZOLE 500 MG tablet   Commonly known as:  FLAGYL   Take 1 tablet by mouth 3 (Three) Times a Day.       ondansetron 4 MG tablet   Commonly known as:  ZOFRAN   Take 1 tablet by mouth Every 6 (Six) Hours As Needed for Nausea or   Vomiting.         Stop          albuterol 108 (90 Base) MCG/ACT inhaler   Commonly known as:  PROVENTIL HFA;VENTOLIN HFA       mirtazapine 30 MG tablet   Commonly known as:   REMERON       sulfamethoxazole-trimethoprim 800-160 MG per tablet   Commonly known as:  BACTRIM DS               Latest Documented Vital Signs:  As of 11:51 AM  BP- 124/69 HR- 53 Temp- 95.9 °F (35.5 °C) (Tympanic) O2 sat- 95%    --    NILE query complete. Treatment plan to include limited course of prescribed  controlled substance. Risks including addiction, benefits, and alternatives presented to patient.     Documentation assistance provided by cam Kaplan for Dr. Webb.  Information recorded by the cam was done at my direction and has been verified and validated by me.     Arjun Kaplan  01/01/18 4507       Akash Webb MD  01/01/18 6517

## 2018-01-01 NOTE — ED TRIAGE NOTES
Pt states that he has hx of diverticulitis. Pt states that he has been seeing GI MD but GI MD is closed today.

## 2018-01-02 ENCOUNTER — TELEPHONE (OUTPATIENT)
Dept: SPORTS MEDICINE | Facility: CLINIC | Age: 53
End: 2018-01-02

## 2018-01-02 DIAGNOSIS — K57.32 DIVERTICULITIS OF LARGE INTESTINE WITHOUT PERFORATION OR ABSCESS WITHOUT BLEEDING: Primary | ICD-10-CM

## 2018-01-02 NOTE — TELEPHONE ENCOUNTER
HE ASKS THAT A REFERRAL IS PUT IN TO A SPECIALIST AND HE CANT CONTINUE TO WAIT, HE IS GOING TO LOSE HIS JOB IF HE DOESN'T GET BACK TO WORK

## 2018-01-02 NOTE — TELEPHONE ENCOUNTER
I reviewed ER visit. He should follow up with me if symptoms persist after completing antibiotics they prescribed.

## 2018-01-02 NOTE — TELEPHONE ENCOUNTER
HE WAS SEEN IN THE ER BECAUSE PAIN WAS SO BAD, WANTS TO KNOW IF HE NEEDS TO BE SEEN BY YOU OR IF YOU BELIEVE HE SHOULD BE REFERRED OUT DEPENDENT ON THE ER VISIT

## 2018-01-08 ENCOUNTER — OFFICE VISIT (OUTPATIENT)
Dept: SPORTS MEDICINE | Facility: CLINIC | Age: 53
End: 2018-01-08

## 2018-01-08 VITALS
WEIGHT: 243 LBS | HEIGHT: 74 IN | DIASTOLIC BLOOD PRESSURE: 90 MMHG | TEMPERATURE: 98.8 F | OXYGEN SATURATION: 95 % | HEART RATE: 65 BPM | SYSTOLIC BLOOD PRESSURE: 124 MMHG | BODY MASS INDEX: 31.18 KG/M2

## 2018-01-08 DIAGNOSIS — I10 ESSENTIAL HYPERTENSION: ICD-10-CM

## 2018-01-08 DIAGNOSIS — K57.32 DIVERTICULITIS OF LARGE INTESTINE WITHOUT PERFORATION OR ABSCESS WITHOUT BLEEDING: Primary | ICD-10-CM

## 2018-01-08 DIAGNOSIS — R10.84 GENERALIZED ABDOMINAL PAIN: ICD-10-CM

## 2018-01-08 PROCEDURE — 99214 OFFICE O/P EST MOD 30 MIN: CPT | Performed by: FAMILY MEDICINE

## 2018-01-08 RX ORDER — LISINOPRIL 20 MG/1
TABLET ORAL
Qty: 30 TABLET | Refills: 0 | Status: SHIPPED | OUTPATIENT
Start: 2018-01-08 | End: 2018-01-31 | Stop reason: SDUPTHER

## 2018-01-08 RX ORDER — ONDANSETRON 4 MG/1
4 TABLET, FILM COATED ORAL EVERY 6 HOURS PRN
Qty: 20 TABLET | Refills: 0 | Status: SHIPPED | OUTPATIENT
Start: 2018-01-08 | End: 2018-01-16

## 2018-01-08 RX ORDER — METRONIDAZOLE 500 MG/1
500 TABLET ORAL 3 TIMES DAILY
Qty: 21 TABLET | Refills: 0 | Status: SHIPPED | OUTPATIENT
Start: 2018-01-08 | End: 2018-01-15

## 2018-01-08 RX ORDER — CEPHALEXIN 500 MG/1
500 CAPSULE ORAL 3 TIMES DAILY
Qty: 21 CAPSULE | Refills: 0 | Status: SHIPPED | OUTPATIENT
Start: 2018-01-08 | End: 2018-01-15

## 2018-01-08 NOTE — PROGRESS NOTES
"Flako is a 52 y.o. year old male    Chief Complaint   Patient presents with   • Diverticulitis       History of Present Illness   HPI     Diverticulitis: Ongoing.  Since last visit with me, he went to the emergency department where blood work and CAT scan were performed.  CAT scan showed acute diverticulitis.  The recommended to change his antibiotic from Bactrim for which he had only taken a few doses of to Keflex and Flagyl.  The original prescription was dated 1/1/18 in instructions were to take 3 times a day.  However, patient has been taking every 3 hours and ran out of his prescription on Friday.  He has been to see Dr. Noriega, general surgeon who has discussed EGD next week as well as likely resection of the diseased colon.  No longer having fever.  Still having bloody BMs.    I have reviewed the patient's medical, family, and social history in detail and updated the computerized patient record.    Review of Systems   Constitutional: Negative for chills, fatigue and fever.   HENT: Negative for postnasal drip and sore throat.    Eyes: Negative for pain.   Respiratory: Negative for cough, chest tightness and wheezing.    Cardiovascular: Negative for chest pain.   Gastrointestinal: Positive for abdominal pain, blood in stool and diarrhea.   Musculoskeletal: Negative for myalgias.   Skin: Negative for rash.   Neurological: Negative for numbness and headaches.   Psychiatric/Behavioral: Negative.    All other systems reviewed and are negative.      /90  Pulse 65  Temp 98.8 °F (37.1 °C)  Ht 188 cm (74\")  Wt 110 kg (243 lb)  SpO2 95%  BMI 31.2 kg/m2     Physical Exam   Constitutional: He is oriented to person, place, and time. Vital signs are normal. He appears well-developed and well-nourished.   HENT:   Head: Normocephalic and atraumatic.   Eyes: Conjunctivae and EOM are normal.   Pulmonary/Chest: No accessory muscle usage. No respiratory distress.   Abdominal: Soft. Normal appearance and bowel sounds " are normal. There is tenderness in the periumbilical area and left lower quadrant. There is no rigidity and no guarding.   Musculoskeletal: He exhibits no deformity.   Neurological: He is alert and oriented to person, place, and time.   Skin: Skin is warm and dry. No rash noted.   Psychiatric: He has a normal mood and affect. His behavior is normal.   Nursing note and vitals reviewed.    1/1/18 Blood work reviewed through EMR.  No anemia.  Mildly elevated lipase.  Metabolic panel within normal limits.    1/1/18 CT abdomen pelvis with contrast revealed acute jejunal diverticulitis with wall thickening.     Diagnoses and all orders for this visit:    Diverticulitis of large intestine without perforation or abscess without bleeding  -     ondansetron (ZOFRAN) 4 MG tablet; Take 1 tablet by mouth Every 6 (Six) Hours As Needed for Nausea or Vomiting.  -     metroNIDAZOLE (FLAGYL) 500 MG tablet; Take 1 tablet by mouth 3 (Three) Times a Day for 7 days.  -     cephalexin (KEFLEX) 500 MG capsule; Take 1 capsule by mouth 3 (Three) Times a Day for 7 days.    Generalized abdominal pain       Discussed with Flako that I think at this time, he should direct his concerns to surgeon as it does sound like surgery is in the near future.  Reviewed proper schedule of antibiotic.  He is having some bloody BMs but he had those prior to starting antibiotic.  I did send in an additional 7 day course of previous antibiotics as well as a refill on Zofran.  Proceed accordingly based on surgeon's recommendations.      EMR Dragon/Transcription disclaimer:    Much of this encounter note is an electronic transcription/translation of spoken language to printed text.  The electronic translation of spoken language may permit erroneous, or at times, nonsensical words or phrases to be inadvertently transcribed.  Although I have reviewed the note for such errors some may still exist.

## 2018-01-16 ENCOUNTER — OFFICE VISIT (OUTPATIENT)
Dept: SPORTS MEDICINE | Facility: CLINIC | Age: 53
End: 2018-01-16

## 2018-01-16 VITALS
BODY MASS INDEX: 29.65 KG/M2 | DIASTOLIC BLOOD PRESSURE: 92 MMHG | SYSTOLIC BLOOD PRESSURE: 140 MMHG | HEIGHT: 74 IN | OXYGEN SATURATION: 98 % | WEIGHT: 231 LBS | TEMPERATURE: 98.8 F | HEART RATE: 75 BPM

## 2018-01-16 DIAGNOSIS — I10 ESSENTIAL HYPERTENSION: Primary | ICD-10-CM

## 2018-01-16 DIAGNOSIS — Z90.49 STATUS POST COLON RESECTION: ICD-10-CM

## 2018-01-16 PROCEDURE — 99214 OFFICE O/P EST MOD 30 MIN: CPT | Performed by: FAMILY MEDICINE

## 2018-01-16 RX ORDER — HYDROCHLOROTHIAZIDE 25 MG/1
25 TABLET ORAL DAILY
Qty: 90 TABLET | Refills: 1 | Status: SHIPPED | OUTPATIENT
Start: 2018-01-16 | End: 2018-02-12 | Stop reason: SDUPTHER

## 2018-01-16 RX ORDER — RANITIDINE 150 MG/1
150 CAPSULE ORAL
COMMUNITY
End: 2018-10-10

## 2018-01-16 RX ORDER — HYDROCODONE BITARTRATE AND ACETAMINOPHEN 5; 325 MG/1; MG/1
1 TABLET ORAL
COMMUNITY
Start: 2018-01-01 | End: 2018-08-20

## 2018-01-16 NOTE — PROGRESS NOTES
"Flako is a 52 y.o. year old male    Chief Complaint   Patient presents with   • Hypertension     had surgery on friday and cant get b/p to go down the lowest b/p was 180/119        History of Present Illness   HPI     HTN: taking Rx as prescribed. Hospitalized 1/12/18 - 1/14/18 for small bowel resection at [facility]. I reviewed BP ranges there and they were elevated up to 160s systolic.  He states that since leaving the hospital, he has been checking his blood pressure at home on his cuff and it was as high as 180s over 120s.  Brought cuff with him today. Associates blurred vision with this.  Has been on HCTZ in the past as well and seemed to work well.  He has been staying well hydrated.    He is experiencing some constipation since leaving the hospital which could be attributed to surgery and/or his pain medication.  He has called Dr. Noriega's office for further recommendations.    I have reviewed the patient's medical, family, and social history in detail and updated the computerized patient record.    Review of Systems   Constitutional: Negative for chills, fatigue and fever.   HENT: Negative for postnasal drip and sore throat.    Eyes: Positive for visual disturbance. Negative for pain.   Respiratory: Negative for cough, chest tightness and wheezing.    Cardiovascular: Negative for chest pain.   Gastrointestinal: Positive for constipation.   Musculoskeletal: Negative for myalgias.   Skin: Negative for rash.   Neurological: Negative for numbness and headaches.   Psychiatric/Behavioral: Negative.    All other systems reviewed and are negative.      /92 (BP Location: Left arm, Patient Position: Sitting, Cuff Size: Large Adult)  Pulse 75  Temp 98.8 °F (37.1 °C) (Oral)   Ht 188 cm (74\")  Wt 105 kg (231 lb)  SpO2 98%  BMI 29.66 kg/m2     Physical Exam   Constitutional: He is oriented to person, place, and time. He appears well-developed and well-nourished.   HENT:   Head: Normocephalic and atraumatic. "   Right Ear: External ear normal.   Left Ear: External ear normal.   Nose: Nose normal.   Mouth/Throat: Oropharynx is clear and moist.   Eyes: EOM are normal.   Neck: Normal range of motion.   Cardiovascular: Normal rate and regular rhythm.    Pulmonary/Chest: Effort normal and breath sounds normal.   Abdominal: Soft. Normal appearance and bowel sounds are normal.       Neurological: He is alert and oriented to person, place, and time.   Skin: Skin is warm and dry. No rash noted.   Psychiatric: He has a normal mood and affect. His behavior is normal.   Nursing note and vitals reviewed.       Diagnoses and all orders for this visit:    Essential hypertension  -     hydrochlorothiazide (HYDRODIURIL) 25 MG tablet; Take 1 tablet by mouth Daily.    Status post colon resection    Other orders  -     HYDROcodone-acetaminophen (NORCO) 5-325 MG per tablet; Take 1 tablet by mouth.  -     ranitidine (ZANTAC) 150 MG capsule; Take 150 mg by mouth.       1. Add HCTZ. Can monitor BP at home.   2. Incision looks good. Constipation likely 2/2 opiate. Has already called surgeon's office. F/up with him next week.      EMR Dragon/Transcription disclaimer:    Much of this encounter note is an electronic transcription/translation of spoken language to printed text.  The electronic translation of spoken language may permit erroneous, or at times, nonsensical words or phrases to be inadvertently transcribed.  Although I have reviewed the note for such errors some may still exist.

## 2018-01-31 DIAGNOSIS — I10 ESSENTIAL HYPERTENSION: ICD-10-CM

## 2018-01-31 RX ORDER — LISINOPRIL 20 MG/1
TABLET ORAL
Qty: 30 TABLET | Refills: 0 | Status: SHIPPED | OUTPATIENT
Start: 2018-01-31 | End: 2018-02-12 | Stop reason: SDUPTHER

## 2018-02-12 DIAGNOSIS — I10 ESSENTIAL HYPERTENSION: ICD-10-CM

## 2018-02-12 RX ORDER — AMLODIPINE BESYLATE 10 MG/1
10 TABLET ORAL DAILY
Qty: 90 TABLET | Refills: 1 | Status: SHIPPED | OUTPATIENT
Start: 2018-02-12 | End: 2018-04-10 | Stop reason: SDUPTHER

## 2018-02-12 RX ORDER — LISINOPRIL 20 MG/1
20 TABLET ORAL DAILY
Qty: 30 TABLET | Refills: 0 | Status: SHIPPED | OUTPATIENT
Start: 2018-02-12 | End: 2018-02-14 | Stop reason: SDUPTHER

## 2018-02-12 RX ORDER — LISINOPRIL 20 MG/1
20 TABLET ORAL DAILY
Qty: 30 TABLET | Refills: 0 | Status: SHIPPED | OUTPATIENT
Start: 2018-02-12 | End: 2018-02-12 | Stop reason: SDUPTHER

## 2018-02-12 RX ORDER — HYDROCHLOROTHIAZIDE 25 MG/1
25 TABLET ORAL DAILY
Qty: 90 TABLET | Refills: 1 | Status: SHIPPED | OUTPATIENT
Start: 2018-02-12 | End: 2018-04-10 | Stop reason: SDUPTHER

## 2018-02-12 RX ORDER — AMLODIPINE BESYLATE 10 MG/1
10 TABLET ORAL DAILY
Qty: 90 TABLET | Refills: 1 | Status: SHIPPED | OUTPATIENT
Start: 2018-02-12 | End: 2019-03-02 | Stop reason: SDUPTHER

## 2018-02-12 RX ORDER — HYDROCHLOROTHIAZIDE 25 MG/1
25 TABLET ORAL DAILY
Qty: 90 TABLET | Refills: 1 | Status: SHIPPED | OUTPATIENT
Start: 2018-02-12 | End: 2018-08-06

## 2018-02-14 DIAGNOSIS — I10 ESSENTIAL HYPERTENSION: ICD-10-CM

## 2018-02-14 RX ORDER — LISINOPRIL 20 MG/1
20 TABLET ORAL DAILY
Qty: 90 TABLET | Refills: 0 | Status: SHIPPED | OUTPATIENT
Start: 2018-02-14 | End: 2018-06-02 | Stop reason: SDUPTHER

## 2018-04-10 ENCOUNTER — OFFICE VISIT (OUTPATIENT)
Dept: SPORTS MEDICINE | Facility: CLINIC | Age: 53
End: 2018-04-10

## 2018-04-10 VITALS
DIASTOLIC BLOOD PRESSURE: 72 MMHG | SYSTOLIC BLOOD PRESSURE: 124 MMHG | BODY MASS INDEX: 29.24 KG/M2 | WEIGHT: 227.8 LBS | HEIGHT: 74 IN

## 2018-04-10 DIAGNOSIS — I10 ESSENTIAL HYPERTENSION: ICD-10-CM

## 2018-04-10 DIAGNOSIS — K57.32 DIVERTICULITIS OF LARGE INTESTINE WITHOUT PERFORATION OR ABSCESS WITHOUT BLEEDING: ICD-10-CM

## 2018-04-10 DIAGNOSIS — F41.8 MIXED ANXIETY DEPRESSIVE DISORDER: Primary | ICD-10-CM

## 2018-04-10 PROCEDURE — 99214 OFFICE O/P EST MOD 30 MIN: CPT | Performed by: FAMILY MEDICINE

## 2018-04-10 RX ORDER — CITALOPRAM 10 MG/1
10 TABLET ORAL DAILY
Qty: 30 TABLET | Refills: 2 | Status: SHIPPED | OUTPATIENT
Start: 2018-04-10 | End: 2018-07-18 | Stop reason: SDUPTHER

## 2018-04-10 RX ORDER — CIPROFLOXACIN 500 MG/1
500 TABLET, FILM COATED ORAL
COMMUNITY
Start: 2018-04-02 | End: 2018-04-12

## 2018-04-10 NOTE — PROGRESS NOTES
"Flako is a 52 y.o. year old male    Chief Complaint   Patient presents with   • Follow-up     Hospital        History of Present Illness   HPI      Diverticulitis: recently hospitalized at [facility] due to flare up of symptoms. Was followed by Dr. Noriega who recently performed small bowel resection due to recurrent diverticulitis. His recent flare was thought to be a small perforation in his large intestine.  He was placed on IV antibiotics and then transitioned to oral which she is currently taking.  He has had outpatient follow-up with neurosurgeon and has another one pending in May.  Overall, symptoms are improving.  I reviewed his records through care everywhere including blood work.    Anxiety: Due to recent hospitalizations as well as family health concerns, he has been having increased anxiety as well as some depressive type symptoms.  He has racing thoughts that keep him up at night.  Has problems focusing at work due to his anxiety.  Is interested in counseling but also discussing medication.    I have reviewed the patient's medical, family, and social history in detail and updated the computerized patient record.    Review of Systems   Constitutional: Negative for chills, fatigue and fever.   HENT: Negative for congestion, rhinorrhea and sinus pressure.    Respiratory: Negative for chest tightness and shortness of breath.    Cardiovascular: Negative for chest pain.   Gastrointestinal: Negative for abdominal pain and blood in stool.   Musculoskeletal: Negative for arthralgias.   Skin: Negative for rash.   Neurological: Negative for numbness and headaches.   Psychiatric/Behavioral: Positive for sleep disturbance. The patient is nervous/anxious.    All other systems reviewed and are negative.      /72   Ht 188 cm (74\")   Wt 103 kg (227 lb 12.8 oz)   BMI 29.25 kg/m²      Physical Exam   Constitutional: He is oriented to person, place, and time. Vital signs are normal. He appears well-developed and " well-nourished.   HENT:   Head: Normocephalic and atraumatic.   Eyes: Conjunctivae and EOM are normal.   Pulmonary/Chest: No accessory muscle usage. No respiratory distress.   Abdominal: Soft. Normal appearance and bowel sounds are normal. There is no tenderness.   Surgical incision CDI   Musculoskeletal: He exhibits no deformity.   Neurological: He is alert and oriented to person, place, and time.   Skin: Skin is warm and dry. No rash noted.   Psychiatric: He has a normal mood and affect. His behavior is normal.   Nursing note and vitals reviewed.       Diagnoses and all orders for this visit:    Mixed anxiety depressive disorder  -     citalopram (CELEXA) 10 MG tablet; Take 1 tablet by mouth Daily.    Diverticulitis of large intestine without perforation or abscess without bleeding    Essential hypertension    Other orders  -     ciprofloxacin (CIPRO) 500 MG tablet; Take 500 mg by mouth.       1.  Discussed treatment options with Flako.  He is agreeable to seeking counseling and contact information was given today.  Also discussed utility of medication which she would like to only be offered short-term.  Start Celexa 10 mg daily and follow-up in 4 weeks.  2.  Resolving.  Complete antibiotics.  Outpatient follow-up with general surgery next month.  3.  Stable and at goal.    EMR Dragon/Transcription disclaimer:    Much of this encounter note is an electronic transcription/translation of spoken language to printed text.  The electronic translation of spoken language may permit erroneous, or at times, nonsensical words or phrases to be inadvertently transcribed.  Although I have reviewed the note for such errors some may still exist.

## 2018-06-02 DIAGNOSIS — I10 ESSENTIAL HYPERTENSION: ICD-10-CM

## 2018-06-04 RX ORDER — LISINOPRIL 20 MG/1
20 TABLET ORAL DAILY
Qty: 90 TABLET | Refills: 3 | Status: SHIPPED | OUTPATIENT
Start: 2018-06-04 | End: 2018-10-10 | Stop reason: SDUPTHER

## 2018-06-07 ENCOUNTER — ON CAMPUS - OUTPATIENT (OUTPATIENT)
Dept: URBAN - METROPOLITAN AREA HOSPITAL 108 | Facility: HOSPITAL | Age: 53
End: 2018-06-07
Payer: COMMERCIAL

## 2018-06-07 DIAGNOSIS — K20.8 OTHER ESOPHAGITIS: ICD-10-CM

## 2018-06-07 DIAGNOSIS — K57.10 DIVERTICULOSIS OF SMALL INTESTINE WITHOUT PERFORATION OR ABS: ICD-10-CM

## 2018-06-07 DIAGNOSIS — R93.3 ABNORMAL FINDINGS ON DIAGNOSTIC IMAGING OF OTHER PARTS OF DI: ICD-10-CM

## 2018-06-07 DIAGNOSIS — K29.70 GASTRITIS, UNSPECIFIED, WITHOUT BLEEDING: ICD-10-CM

## 2018-06-07 DIAGNOSIS — Z98.890 OTHER SPECIFIED POSTPROCEDURAL STATES: ICD-10-CM

## 2018-06-07 DIAGNOSIS — K26.9 DUODENAL ULCER, UNSPECIFIED AS ACUTE OR CHRONIC, WITHOUT HEM: ICD-10-CM

## 2018-06-07 DIAGNOSIS — K29.80 DUODENITIS WITHOUT BLEEDING: ICD-10-CM

## 2018-06-07 PROCEDURE — 44360 SMALL BOWEL ENDOSCOPY: CPT

## 2018-06-18 ENCOUNTER — INPATIENT HOSPITAL (OUTPATIENT)
Dept: URBAN - METROPOLITAN AREA HOSPITAL 107 | Facility: HOSPITAL | Age: 53
End: 2018-06-18
Payer: COMMERCIAL

## 2018-06-18 DIAGNOSIS — R10.31 RIGHT LOWER QUADRANT PAIN: ICD-10-CM

## 2018-06-18 DIAGNOSIS — R10.32 LEFT LOWER QUADRANT PAIN: ICD-10-CM

## 2018-06-18 DIAGNOSIS — R14.1 GAS PAIN: ICD-10-CM

## 2018-06-18 DIAGNOSIS — R11.2 NAUSEA WITH VOMITING, UNSPECIFIED: ICD-10-CM

## 2018-06-18 PROCEDURE — 99253 IP/OBS CNSLTJ NEW/EST LOW 45: CPT | Performed by: INTERNAL MEDICINE

## 2018-06-19 ENCOUNTER — INPATIENT HOSPITAL (OUTPATIENT)
Dept: URBAN - METROPOLITAN AREA HOSPITAL 107 | Facility: HOSPITAL | Age: 53
End: 2018-06-19
Payer: COMMERCIAL

## 2018-06-19 DIAGNOSIS — R10.31 RIGHT LOWER QUADRANT PAIN: ICD-10-CM

## 2018-06-19 DIAGNOSIS — R14.1 GAS PAIN: ICD-10-CM

## 2018-06-19 DIAGNOSIS — R10.32 LEFT LOWER QUADRANT PAIN: ICD-10-CM

## 2018-06-19 DIAGNOSIS — R11.2 NAUSEA WITH VOMITING, UNSPECIFIED: ICD-10-CM

## 2018-06-19 PROCEDURE — 99232 SBSQ HOSP IP/OBS MODERATE 35: CPT | Performed by: PHYSICIAN ASSISTANT

## 2018-06-28 ENCOUNTER — INPATIENT HOSPITAL (OUTPATIENT)
Dept: URBAN - METROPOLITAN AREA HOSPITAL 107 | Facility: HOSPITAL | Age: 53
End: 2018-06-28
Payer: COMMERCIAL

## 2018-06-28 DIAGNOSIS — R10.9 UNSPECIFIED ABDOMINAL PAIN: ICD-10-CM

## 2018-06-28 DIAGNOSIS — K52.9 NONINFECTIVE GASTROENTERITIS AND COLITIS, UNSPECIFIED: ICD-10-CM

## 2018-06-28 DIAGNOSIS — R93.3 ABNORMAL FINDINGS ON DIAGNOSTIC IMAGING OF OTHER PARTS OF DI: ICD-10-CM

## 2018-06-28 DIAGNOSIS — K57.30 DIVERTICULOSIS OF LARGE INTESTINE WITHOUT PERFORATION OR ABS: ICD-10-CM

## 2018-06-28 PROCEDURE — 99254 IP/OBS CNSLTJ NEW/EST MOD 60: CPT

## 2018-06-29 ENCOUNTER — INPATIENT HOSPITAL (OUTPATIENT)
Dept: URBAN - METROPOLITAN AREA HOSPITAL 107 | Facility: HOSPITAL | Age: 53
End: 2018-06-29
Payer: COMMERCIAL

## 2018-06-29 DIAGNOSIS — K57.30 DIVERTICULOSIS OF LARGE INTESTINE WITHOUT PERFORATION OR ABS: ICD-10-CM

## 2018-06-29 DIAGNOSIS — R93.3 ABNORMAL FINDINGS ON DIAGNOSTIC IMAGING OF OTHER PARTS OF DI: ICD-10-CM

## 2018-06-29 DIAGNOSIS — K52.9 NONINFECTIVE GASTROENTERITIS AND COLITIS, UNSPECIFIED: ICD-10-CM

## 2018-06-29 DIAGNOSIS — R10.9 UNSPECIFIED ABDOMINAL PAIN: ICD-10-CM

## 2018-06-29 PROCEDURE — 99232 SBSQ HOSP IP/OBS MODERATE 35: CPT | Performed by: PHYSICIAN ASSISTANT

## 2018-07-05 ENCOUNTER — OFFICE (OUTPATIENT)
Dept: URBAN - METROPOLITAN AREA CLINIC 75 | Facility: CLINIC | Age: 53
End: 2018-07-05
Payer: COMMERCIAL

## 2018-07-05 DIAGNOSIS — K57.00 DIVERTICULITIS OF SMALL INTESTINE WITH PERFORATION AND ABSCE: ICD-10-CM

## 2018-07-05 DIAGNOSIS — Z90.49 ACQUIRED ABSENCE OF OTHER SPECIFIED PARTS OF DIGESTIVE TRACT: ICD-10-CM

## 2018-07-05 DIAGNOSIS — Z98.890 OTHER SPECIFIED POSTPROCEDURAL STATES: ICD-10-CM

## 2018-07-05 DIAGNOSIS — D13.39 BENIGN NEOPLASM OF OTHER PARTS OF SMALL INTESTINE: ICD-10-CM

## 2018-07-05 DIAGNOSIS — R10.9 UNSPECIFIED ABDOMINAL PAIN: ICD-10-CM

## 2018-07-05 PROCEDURE — 91110 GI TRC IMG INTRAL ESOPH-ILE: CPT

## 2018-07-18 ENCOUNTER — ON CAMPUS - OUTPATIENT (OUTPATIENT)
Dept: URBAN - METROPOLITAN AREA HOSPITAL 108 | Facility: HOSPITAL | Age: 53
End: 2018-07-18
Payer: COMMERCIAL

## 2018-07-18 DIAGNOSIS — R19.7 DIARRHEA, UNSPECIFIED: ICD-10-CM

## 2018-07-18 DIAGNOSIS — R11.2 NAUSEA WITH VOMITING, UNSPECIFIED: ICD-10-CM

## 2018-07-18 DIAGNOSIS — F41.8 MIXED ANXIETY DEPRESSIVE DISORDER: ICD-10-CM

## 2018-07-18 DIAGNOSIS — R10.9 UNSPECIFIED ABDOMINAL PAIN: ICD-10-CM

## 2018-07-18 PROCEDURE — 99254 IP/OBS CNSLTJ NEW/EST MOD 60: CPT

## 2018-07-18 RX ORDER — CITALOPRAM 10 MG/1
10 TABLET ORAL DAILY
Qty: 30 TABLET | Refills: 2 | Status: SHIPPED | OUTPATIENT
Start: 2018-07-18 | End: 2018-08-06 | Stop reason: SDUPTHER

## 2018-07-27 ENCOUNTER — INPATIENT HOSPITAL (OUTPATIENT)
Dept: URBAN - METROPOLITAN AREA HOSPITAL 107 | Facility: HOSPITAL | Age: 53
End: 2018-07-27
Payer: COMMERCIAL

## 2018-07-27 DIAGNOSIS — K21.9 GASTRO-ESOPHAGEAL REFLUX DISEASE WITHOUT ESOPHAGITIS: ICD-10-CM

## 2018-07-27 DIAGNOSIS — K92.2 GASTROINTESTINAL HEMORRHAGE, UNSPECIFIED: ICD-10-CM

## 2018-07-27 DIAGNOSIS — R10.9 UNSPECIFIED ABDOMINAL PAIN: ICD-10-CM

## 2018-07-27 DIAGNOSIS — K57.92 DIVERTICULITIS OF INTESTINE, PART UNSPECIFIED, WITHOUT PERFO: ICD-10-CM

## 2018-07-27 PROCEDURE — 99255 IP/OBS CONSLTJ NEW/EST HI 80: CPT | Performed by: INTERNAL MEDICINE

## 2018-07-28 ENCOUNTER — INPATIENT HOSPITAL (OUTPATIENT)
Dept: URBAN - METROPOLITAN AREA HOSPITAL 107 | Facility: HOSPITAL | Age: 53
End: 2018-07-28
Payer: COMMERCIAL

## 2018-07-28 DIAGNOSIS — K92.2 GASTROINTESTINAL HEMORRHAGE, UNSPECIFIED: ICD-10-CM

## 2018-07-28 DIAGNOSIS — R10.9 UNSPECIFIED ABDOMINAL PAIN: ICD-10-CM

## 2018-07-28 DIAGNOSIS — K57.30 DIVERTICULOSIS OF LARGE INTESTINE WITHOUT PERFORATION OR ABS: ICD-10-CM

## 2018-07-28 PROCEDURE — 99232 SBSQ HOSP IP/OBS MODERATE 35: CPT | Performed by: INTERNAL MEDICINE

## 2018-07-29 ENCOUNTER — INPATIENT HOSPITAL (OUTPATIENT)
Dept: URBAN - METROPOLITAN AREA HOSPITAL 107 | Facility: HOSPITAL | Age: 53
End: 2018-07-29
Payer: COMMERCIAL

## 2018-07-29 DIAGNOSIS — K21.9 GASTRO-ESOPHAGEAL REFLUX DISEASE WITHOUT ESOPHAGITIS: ICD-10-CM

## 2018-07-29 DIAGNOSIS — K57.30 DIVERTICULOSIS OF LARGE INTESTINE WITHOUT PERFORATION OR ABS: ICD-10-CM

## 2018-07-29 DIAGNOSIS — R10.9 UNSPECIFIED ABDOMINAL PAIN: ICD-10-CM

## 2018-07-29 DIAGNOSIS — K92.2 GASTROINTESTINAL HEMORRHAGE, UNSPECIFIED: ICD-10-CM

## 2018-07-29 PROCEDURE — 99232 SBSQ HOSP IP/OBS MODERATE 35: CPT | Performed by: INTERNAL MEDICINE

## 2018-08-06 ENCOUNTER — OFFICE VISIT (OUTPATIENT)
Dept: SPORTS MEDICINE | Facility: CLINIC | Age: 53
End: 2018-08-06

## 2018-08-06 VITALS
TEMPERATURE: 98.3 F | HEIGHT: 74 IN | BODY MASS INDEX: 28 KG/M2 | DIASTOLIC BLOOD PRESSURE: 102 MMHG | OXYGEN SATURATION: 95 % | WEIGHT: 218.2 LBS | HEART RATE: 94 BPM | SYSTOLIC BLOOD PRESSURE: 138 MMHG

## 2018-08-06 DIAGNOSIS — K57.10 DIVERTICULOSIS OF SMALL INTESTINE WITHOUT HEMORRHAGE: ICD-10-CM

## 2018-08-06 DIAGNOSIS — I10 ACCELERATED HYPERTENSION: ICD-10-CM

## 2018-08-06 DIAGNOSIS — G89.29 CHRONIC ABDOMINAL PAIN: ICD-10-CM

## 2018-08-06 DIAGNOSIS — I10 ESSENTIAL HYPERTENSION: Primary | ICD-10-CM

## 2018-08-06 DIAGNOSIS — R10.9 CHRONIC ABDOMINAL PAIN: ICD-10-CM

## 2018-08-06 PROBLEM — D73.89 NODULE OF SPLEEN: Status: ACTIVE | Noted: 2018-06-27

## 2018-08-06 PROBLEM — K57.90 DIVERTICULOSIS: Status: ACTIVE | Noted: 2018-07-27

## 2018-08-06 PROCEDURE — 99215 OFFICE O/P EST HI 40 MIN: CPT | Performed by: FAMILY MEDICINE

## 2018-08-06 RX ORDER — HYDRALAZINE HYDROCHLORIDE 50 MG/1
TABLET, FILM COATED ORAL
COMMUNITY
Start: 2018-08-02 | End: 2018-08-06 | Stop reason: SDUPTHER

## 2018-08-06 RX ORDER — METHOCARBAMOL 500 MG/1
TABLET, FILM COATED ORAL
Refills: 0 | COMMUNITY
Start: 2018-08-02 | End: 2018-08-20

## 2018-08-06 RX ORDER — ACETAMINOPHEN AND CODEINE PHOSPHATE 300; 30 MG/1; MG/1
TABLET ORAL
COMMUNITY
Start: 2018-08-01 | End: 2018-08-20

## 2018-08-06 RX ORDER — NORTRIPTYLINE HYDROCHLORIDE 10 MG/1
CAPSULE ORAL
COMMUNITY
Start: 2018-06-29 | End: 2018-08-20

## 2018-08-06 RX ORDER — IBUPROFEN 600 MG/1
TABLET ORAL
COMMUNITY
Start: 2018-05-29 | End: 2018-10-10

## 2018-08-06 RX ORDER — DOXAZOSIN MESYLATE 1 MG/1
TABLET ORAL
COMMUNITY
Start: 2018-08-04 | End: 2018-08-06 | Stop reason: SDUPTHER

## 2018-08-06 RX ORDER — MESALAMINE 250 MG/1
CAPSULE ORAL
COMMUNITY
Start: 2018-06-30 | End: 2018-08-20

## 2018-08-06 RX ORDER — GABAPENTIN 300 MG/1
300 CAPSULE ORAL
COMMUNITY
Start: 2018-06-21 | End: 2018-08-20 | Stop reason: SDUPTHER

## 2018-08-06 RX ORDER — CHLORTHALIDONE 25 MG/1
TABLET ORAL
COMMUNITY
Start: 2018-08-01 | End: 2018-10-10

## 2018-08-06 RX ORDER — OXYCODONE AND ACETAMINOPHEN 7.5; 325 MG/1; MG/1
TABLET ORAL
COMMUNITY
Start: 2018-06-29 | End: 2018-08-20

## 2018-08-06 RX ORDER — ONDANSETRON 4 MG/1
TABLET, FILM COATED ORAL
COMMUNITY
Start: 2018-05-17 | End: 2018-08-20

## 2018-08-06 RX ORDER — PROMETHAZINE HYDROCHLORIDE 25 MG/1
TABLET ORAL
COMMUNITY
Start: 2018-05-21 | End: 2018-08-20

## 2018-08-06 RX ORDER — METHOCARBAMOL 500 MG/1
1000 TABLET, FILM COATED ORAL
COMMUNITY
Start: 2018-08-02 | End: 2018-08-06 | Stop reason: SDUPTHER

## 2018-08-06 RX ORDER — AMLODIPINE BESYLATE 10 MG/1
10 TABLET ORAL
COMMUNITY
End: 2018-08-06 | Stop reason: SDUPTHER

## 2018-08-06 RX ORDER — DOXAZOSIN MESYLATE 1 MG/1
1 TABLET ORAL
COMMUNITY
Start: 2018-08-02 | End: 2018-09-01

## 2018-08-06 RX ORDER — GABAPENTIN 300 MG/1
300 CAPSULE ORAL 3 TIMES DAILY
Refills: 0 | COMMUNITY
Start: 2018-06-22 | End: 2018-08-06 | Stop reason: SDUPTHER

## 2018-08-06 RX ORDER — HYDRALAZINE HYDROCHLORIDE 50 MG/1
50 TABLET, FILM COATED ORAL
COMMUNITY
Start: 2018-08-02 | End: 2022-12-12 | Stop reason: HOSPADM

## 2018-08-06 RX ORDER — DICYCLOMINE HCL 20 MG
TABLET ORAL
COMMUNITY
Start: 2018-05-21 | End: 2018-10-10

## 2018-08-06 RX ORDER — DEXLANSOPRAZOLE 30 MG/1
30 CAPSULE, DELAYED RELEASE ORAL
COMMUNITY
End: 2018-10-10

## 2018-08-06 RX ORDER — HYDROMORPHONE HYDROCHLORIDE 2 MG/1
TABLET ORAL
COMMUNITY
Start: 2018-07-20 | End: 2018-08-20

## 2018-08-06 RX ORDER — TESTOSTERONE CYPIONATE 200 MG/ML
INJECTION, SOLUTION INTRAMUSCULAR
Refills: 5 | COMMUNITY
Start: 2018-06-25 | End: 2018-10-10

## 2018-08-06 RX ORDER — CITALOPRAM 20 MG/1
20 TABLET ORAL
COMMUNITY
Start: 2018-08-03 | End: 2018-09-02

## 2018-08-06 RX ORDER — CITALOPRAM 10 MG/1
1 TABLET ORAL
COMMUNITY
Start: 2018-04-10 | End: 2018-08-06 | Stop reason: DRUGHIGH

## 2018-08-06 NOTE — PROGRESS NOTES
"Flako is a 53 y.o. year old male    Chief Complaint   Patient presents with   • Follow-up     Hospital        History of Present Illness   HTN: lisinopril, HCTZ, amlodipine. Reluctant to take Rx after recent hospitalization as he had near code last week.    Chronic abd pain: seeing someone at [facility] this Thursday. Upset stomach due to Dilaudid. Pain level today 3/10.    Anxiety, panic attack: outburst of anger during hospitalization. Has outpt psych eval pending. Frustrated due to time lost at work. On leave, position has been replaced.       Hypertension   This is a chronic problem. The current episode started more than 1 year ago. The problem is unchanged. The problem is uncontrolled. Associated symptoms include anxiety. Pertinent negatives include no chest pain or headaches.        I have reviewed the patient's medical, family, and social history in detail and updated the computerized patient record.    Review of Systems   Constitutional: Negative for chills, fatigue and fever.   HENT: Negative for postnasal drip and sore throat.    Eyes: Negative for pain.   Respiratory: Negative for cough, chest tightness and wheezing.    Cardiovascular: Negative for chest pain.   Gastrointestinal: Negative.    Musculoskeletal: Negative for myalgias.   Skin: Negative for rash.   Neurological: Negative for numbness and headaches.   Psychiatric/Behavioral: Negative.    All other systems reviewed and are negative.      BP (!) 138/102 (BP Location: Right arm, Patient Position: Sitting, Cuff Size: Adult)   Pulse 94   Temp 98.3 °F (36.8 °C) (Oral)   Ht 188 cm (74.02\")   Wt 99 kg (218 lb 3.2 oz)   SpO2 95%   BMI 28.00 kg/m²      Physical Exam   Constitutional: He is oriented to person, place, and time. He appears well-developed and well-nourished.   HENT:   Head: Normocephalic and atraumatic.   Right Ear: External ear normal.   Left Ear: External ear normal.   Nose: Nose normal.   Mouth/Throat: Oropharynx is clear and " moist.   Eyes: EOM are normal.   Neck: Normal range of motion.   Cardiovascular: Normal rate and regular rhythm.    Pulmonary/Chest: Effort normal and breath sounds normal.   Neurological: He is alert and oriented to person, place, and time.   Skin: Skin is warm and dry. No rash noted.   Psychiatric: He has a normal mood and affect. His behavior is normal.   Nursing note and vitals reviewed.       Diagnoses and all orders for this visit:    Essential hypertension    Diverticulosis of small intestine without hemorrhage    Chronic abdominal pain    Accelerated hypertension    Other orders  -     HYDROmorphone (DILAUDID) 2 MG tablet;   -      MG tablet;   -     PENTASA 250 MG CR capsule;   -     methocarbamol (ROBAXIN) 500 MG tablet; TAKE 2 TABLETS BY MOUTH EVERY 8 HOURS AS NEEDED FOR SPASMS  -     nortriptyline (PAMELOR) 10 MG capsule;   -     ondansetron (ZOFRAN) 4 MG tablet;   -     oxyCODONE-acetaminophen (PERCOCET) 7.5-325 MG per tablet;   -     promethazine (PHENERGAN) 25 MG tablet;   -     Testosterone Cypionate (DEPOTESTOTERONE CYPIONATE) 200 MG/ML injection; INJECT 1ML INTERMUSCULAR EVERY 2 WEEKS  -     doxazosin (CARDURA) 1 MG tablet; Take 1 mg by mouth.  -     gabapentin (NEURONTIN) 300 MG capsule; Take 300 mg by mouth.  -     hydrALAZINE (APRESOLINE) 50 MG tablet; Take 50 mg by mouth.  -     Discontinue: methocarbamol (ROBAXIN) 500 MG tablet; Take 1,000 mg by mouth.  -     Discontinue: amLODIPine (NORVASC) 10 MG tablet; Take 10 mg by mouth.  -     Discontinue: citalopram (CeleXA) 10 MG tablet; Take 1 tablet by mouth.  -     citalopram (CeleXA) 20 MG tablet; Take 20 mg by mouth.  -     acetaminophen-codeine (TYLENOL #3) 300-30 MG per tablet;   -     chlorthalidone (HYGROTON) 25 MG tablet;   -     dicyclomine (BENTYL) 20 MG tablet;   -     Discontinue: gabapentin (NEURONTIN) 300 MG capsule; Take 300 mg by mouth 3 (Three) Times a Day.  -     Discontinue: doxazosin (CARDURA) 1 MG tablet;   -      Discontinue: hydrALAZINE (APRESOLINE) 50 MG tablet;   -     dexlansoprazole (DEXILANT) 30 MG capsule; Take 30 mg by mouth.       Greater than 20 minutes of this 40 minute encounter were spent face-to-face discussing recent hospitalizations.  Extensive chart review was performed including blood work, diagnostic imaging that was performed in the hospital.  Pheochromocytoma panel was negative.  CT abdomen pelvis did not show any new processes.  He certainly in a difficult position due to his chronic abdominal pain and intolerance to narcotic medications.  He also seems to have episodes of blood pressure spikes related to his pain.  Did discuss my recommendation for his blood pressure as the following: Continue Cardura, lisinopril, amlodipine, chlorthalidone.  He will take hydralazine 25 mg 3 times daily as needed if blood pressure greater than 160 systolic or diastolic greater than 100.  Has pending second opinion appointment with [facility] this Thursday for his chronic abdominal pain.  Has general surgery in [facility] which follows him closely-Dr. Noriega.  Has new patient appointment for psychiatry as outpatient.  Follow-up with me in 2 weeks for recheck of blood pressure.      EMR Dragon/Transcription disclaimer:    Much of this encounter note is an electronic transcription/translation of spoken language to printed text.  The electronic translation of spoken language may permit erroneous, or at times, nonsensical words or phrases to be inadvertently transcribed.  Although I have reviewed the note for such errors some may still exist.

## 2018-08-20 ENCOUNTER — OFFICE VISIT (OUTPATIENT)
Dept: SPORTS MEDICINE | Facility: CLINIC | Age: 53
End: 2018-08-20

## 2018-08-20 VITALS
BODY MASS INDEX: 27.98 KG/M2 | HEART RATE: 69 BPM | SYSTOLIC BLOOD PRESSURE: 118 MMHG | WEIGHT: 218 LBS | HEIGHT: 74 IN | OXYGEN SATURATION: 98 % | DIASTOLIC BLOOD PRESSURE: 82 MMHG

## 2018-08-20 DIAGNOSIS — I10 ESSENTIAL HYPERTENSION: Primary | ICD-10-CM

## 2018-08-20 DIAGNOSIS — F41.8 MIXED ANXIETY DEPRESSIVE DISORDER: ICD-10-CM

## 2018-08-20 DIAGNOSIS — G89.29 CHRONIC BILATERAL LOWER ABDOMINAL PAIN: ICD-10-CM

## 2018-08-20 DIAGNOSIS — R10.32 CHRONIC BILATERAL LOWER ABDOMINAL PAIN: ICD-10-CM

## 2018-08-20 DIAGNOSIS — R10.31 CHRONIC BILATERAL LOWER ABDOMINAL PAIN: ICD-10-CM

## 2018-08-20 PROBLEM — R10.9 ABDOMINAL PAIN: Status: ACTIVE | Noted: 2018-06-20

## 2018-08-20 PROBLEM — R10.9 ABDOMINAL PAIN: Status: RESOLVED | Noted: 2018-06-20 | Resolved: 2018-08-20

## 2018-08-20 PROCEDURE — 99215 OFFICE O/P EST HI 40 MIN: CPT | Performed by: FAMILY MEDICINE

## 2018-08-20 RX ORDER — HYDROXYZINE PAMOATE 25 MG/1
25 CAPSULE ORAL 3 TIMES DAILY PRN
Refills: 1 | COMMUNITY
Start: 2018-08-08 | End: 2018-10-10

## 2018-08-20 RX ORDER — NALTREXONE 100 %
POWDER (GRAM) MISCELLANEOUS
COMMUNITY
Start: 2018-08-18 | End: 2018-08-20

## 2018-08-20 RX ORDER — DULOXETIN HYDROCHLORIDE 30 MG/1
30 CAPSULE, DELAYED RELEASE ORAL
COMMUNITY
Start: 2018-08-18 | End: 2018-10-10

## 2018-08-20 RX ORDER — ERGOCALCIFEROL 1.25 MG/1
CAPSULE ORAL
COMMUNITY
Start: 2018-08-18 | End: 2018-11-07

## 2018-08-20 RX ORDER — CLONAZEPAM 0.12 MG/1
0.12 TABLET, ORALLY DISINTEGRATING ORAL
COMMUNITY
Start: 2018-08-18 | End: 2018-08-20

## 2018-08-20 RX ORDER — CETIRIZINE HYDROCHLORIDE 10 MG/1
10 TABLET ORAL
COMMUNITY
Start: 2018-08-18 | End: 2018-10-10

## 2018-08-20 RX ORDER — GABAPENTIN 300 MG/1
300 CAPSULE ORAL
COMMUNITY
Start: 2018-08-18 | End: 2018-09-01

## 2018-08-20 NOTE — PROGRESS NOTES
"Flako is a 53 y.o. year old male    Chief Complaint   Patient presents with   • Follow-up     Hospital       History of Present Illness   HPI     HTN: taking medications as we discussed last visit. Only required one dose of hydralazine PRN while inpatient.    Went to [facility] recently - Mon 8/13-18 thru Sat 8/18/18. Admitted there for abd pain. Had some type of \"shots in my back\" Friday night and feels great. \"I'm not in pain.\" Botox. May perform nerve blocks in future. Would like to have referral to pain management locally. Was told formal Dx is IBS. Off all narcotics.    I have reviewed the patient's medical, family, and social history in detail and updated the computerized patient record.    Review of Systems   Constitutional: Negative for chills, fatigue and fever.   HENT: Negative for postnasal drip and sore throat.    Eyes: Negative for pain.   Respiratory: Negative for cough, chest tightness and wheezing.    Cardiovascular: Negative for chest pain.   Gastrointestinal: Negative.    Musculoskeletal: Negative for myalgias.   Skin: Negative for rash.   Neurological: Negative for numbness and headaches.   Psychiatric/Behavioral: Negative.    All other systems reviewed and are negative.      /82   Pulse 69   Ht 188 cm (74\")   Wt 98.9 kg (218 lb)   SpO2 98%   BMI 27.99 kg/m²      Physical Exam   Constitutional: He is oriented to person, place, and time. He appears well-developed and well-nourished.   HENT:   Head: Normocephalic and atraumatic.   Right Ear: External ear normal.   Left Ear: External ear normal.   Nose: Nose normal.   Mouth/Throat: Oropharynx is clear and moist.   Eyes: EOM are normal.   Neck: Normal range of motion.   Cardiovascular: Normal rate and regular rhythm.    Pulmonary/Chest: Effort normal and breath sounds normal.   Neurological: He is alert and oriented to person, place, and time.   Skin: Skin is warm and dry. No rash noted.   Psychiatric: He has a normal mood and affect. " His behavior is normal.   Nursing note and vitals reviewed.    Care Everywhere review of [facility] hospitalization. CT abd/pelvis unremarkable. EGC, cscope peformed. Labs sig for H. Pylori Ab c/w previous exposure. ALLISON likely 2/2 contrast used during procedures. UDS on admit + for cannabinoids, opioids.     Diagnoses and all orders for this visit:    Essential hypertension    Chronic bilateral lower abdominal pain  -     Ambulatory Referral to Pain Management    Mixed anxiety depressive disorder    Other orders  -     cetirizine (zyrTEC) 10 MG tablet; Take 10 mg by mouth.  -     Discontinue: clonazePAM (KlonoPIN) 0.125 MG disintegrating tablet; Take 0.125 mg by mouth.  -     DULoxetine (CYMBALTA) 30 MG capsule; Take 30 mg by mouth.  -     vitamin D (ERGOCALCIFEROL) 67908 units capsule capsule;   -     hydrOXYzine (VISTARIL) 25 MG capsule; Take 25 mg by mouth 3 (Three) Times a Day As Needed.  -     Multiple Vitamins-Minerals (MULTIVITAMIN ADULT PO); Take 1 capsule by mouth.  -     Discontinue: Naltrexone powder; Please formulate for 4.5 mg tablets to be taken daily for 3 months  -     gabapentin (NEURONTIN) 300 MG capsule; Take 300 mg by mouth.       HTN at goal. Updated med list today as well as pain Rx he is no longer taking. Given complexity, will refer to PM upstairs. Has f/up with [facility] 9/11/18.    Greater than 20 minutes of this 40 minute encounter were spent face-to-face discussing recent hospitalization at [facility].      EMR Dragon/Transcription disclaimer:    Much of this encounter note is an electronic transcription/translation of spoken language to printed text.  The electronic translation of spoken language may permit erroneous, or at times, nonsensical words or phrases to be inadvertently transcribed.  Although I have reviewed the note for such errors some may still exist.

## 2018-09-18 ENCOUNTER — INPATIENT HOSPITAL (OUTPATIENT)
Dept: URBAN - METROPOLITAN AREA HOSPITAL 107 | Facility: HOSPITAL | Age: 53
End: 2018-09-18
Payer: COMMERCIAL

## 2018-09-18 DIAGNOSIS — R07.89 OTHER CHEST PAIN: ICD-10-CM

## 2018-09-18 DIAGNOSIS — R12 HEARTBURN: ICD-10-CM

## 2018-09-18 DIAGNOSIS — R10.9 UNSPECIFIED ABDOMINAL PAIN: ICD-10-CM

## 2018-09-18 DIAGNOSIS — R11.2 NAUSEA WITH VOMITING, UNSPECIFIED: ICD-10-CM

## 2018-09-18 PROCEDURE — 99254 IP/OBS CNSLTJ NEW/EST MOD 60: CPT | Performed by: INTERNAL MEDICINE

## 2018-09-19 ENCOUNTER — INPATIENT HOSPITAL (OUTPATIENT)
Dept: URBAN - METROPOLITAN AREA HOSPITAL 107 | Facility: HOSPITAL | Age: 53
End: 2018-09-19
Payer: COMMERCIAL

## 2018-09-19 DIAGNOSIS — K21.0 GASTRO-ESOPHAGEAL REFLUX DISEASE WITH ESOPHAGITIS: ICD-10-CM

## 2018-09-19 DIAGNOSIS — K22.10 ULCER OF ESOPHAGUS WITHOUT BLEEDING: ICD-10-CM

## 2018-09-19 PROCEDURE — 43239 EGD BIOPSY SINGLE/MULTIPLE: CPT | Performed by: INTERNAL MEDICINE

## 2018-09-20 ENCOUNTER — INPATIENT HOSPITAL (OUTPATIENT)
Dept: URBAN - METROPOLITAN AREA HOSPITAL 107 | Facility: HOSPITAL | Age: 53
End: 2018-09-20
Payer: COMMERCIAL

## 2018-09-20 DIAGNOSIS — K22.10 ULCER OF ESOPHAGUS WITHOUT BLEEDING: ICD-10-CM

## 2018-09-20 DIAGNOSIS — K57.30 DIVERTICULOSIS OF LARGE INTESTINE WITHOUT PERFORATION OR ABS: ICD-10-CM

## 2018-09-20 DIAGNOSIS — R10.9 UNSPECIFIED ABDOMINAL PAIN: ICD-10-CM

## 2018-09-20 PROCEDURE — 99232 SBSQ HOSP IP/OBS MODERATE 35: CPT | Performed by: PHYSICIAN ASSISTANT

## 2018-09-21 PROCEDURE — 99231 SBSQ HOSP IP/OBS SF/LOW 25: CPT | Performed by: PHYSICIAN ASSISTANT

## 2018-09-22 ENCOUNTER — INPATIENT HOSPITAL (OUTPATIENT)
Dept: URBAN - METROPOLITAN AREA HOSPITAL 107 | Facility: HOSPITAL | Age: 53
End: 2018-09-22
Payer: COMMERCIAL

## 2018-09-22 DIAGNOSIS — K22.10 ULCER OF ESOPHAGUS WITHOUT BLEEDING: ICD-10-CM

## 2018-09-22 DIAGNOSIS — R13.10 DYSPHAGIA, UNSPECIFIED: ICD-10-CM

## 2018-09-22 PROCEDURE — 99232 SBSQ HOSP IP/OBS MODERATE 35: CPT | Performed by: INTERNAL MEDICINE

## 2018-09-23 ENCOUNTER — INPATIENT HOSPITAL (OUTPATIENT)
Dept: URBAN - METROPOLITAN AREA HOSPITAL 107 | Facility: HOSPITAL | Age: 53
End: 2018-09-23
Payer: COMMERCIAL

## 2018-09-23 DIAGNOSIS — R11.2 NAUSEA WITH VOMITING, UNSPECIFIED: ICD-10-CM

## 2018-09-23 DIAGNOSIS — K21.0 GASTRO-ESOPHAGEAL REFLUX DISEASE WITH ESOPHAGITIS: ICD-10-CM

## 2018-09-23 DIAGNOSIS — R07.89 OTHER CHEST PAIN: ICD-10-CM

## 2018-09-23 DIAGNOSIS — R13.10 DYSPHAGIA, UNSPECIFIED: ICD-10-CM

## 2018-09-23 PROCEDURE — 99232 SBSQ HOSP IP/OBS MODERATE 35: CPT | Performed by: INTERNAL MEDICINE

## 2018-09-24 ENCOUNTER — INPATIENT HOSPITAL (OUTPATIENT)
Dept: URBAN - METROPOLITAN AREA HOSPITAL 107 | Facility: HOSPITAL | Age: 53
End: 2018-09-24
Payer: COMMERCIAL

## 2018-09-24 DIAGNOSIS — R10.12 LEFT UPPER QUADRANT PAIN: ICD-10-CM

## 2018-09-24 DIAGNOSIS — K57.30 DIVERTICULOSIS OF LARGE INTESTINE WITHOUT PERFORATION OR ABS: ICD-10-CM

## 2018-09-24 DIAGNOSIS — R10.11 RIGHT UPPER QUADRANT PAIN: ICD-10-CM

## 2018-09-24 DIAGNOSIS — K22.10 ULCER OF ESOPHAGUS WITHOUT BLEEDING: ICD-10-CM

## 2018-09-24 DIAGNOSIS — R13.10 DYSPHAGIA, UNSPECIFIED: ICD-10-CM

## 2018-09-24 PROCEDURE — 99232 SBSQ HOSP IP/OBS MODERATE 35: CPT | Performed by: PHYSICIAN ASSISTANT

## 2018-09-26 ENCOUNTER — TRANSITIONAL CARE MANAGEMENT TELEPHONE ENCOUNTER (OUTPATIENT)
Dept: SPORTS MEDICINE | Facility: CLINIC | Age: 53
End: 2018-09-26

## 2018-09-27 ENCOUNTER — TRANSITIONAL CARE MANAGEMENT TELEPHONE ENCOUNTER (OUTPATIENT)
Dept: SPORTS MEDICINE | Facility: CLINIC | Age: 53
End: 2018-09-27

## 2018-09-28 ENCOUNTER — TELEPHONE (OUTPATIENT)
Dept: SPORTS MEDICINE | Facility: CLINIC | Age: 53
End: 2018-09-28

## 2018-09-28 NOTE — TELEPHONE ENCOUNTER
Pt returned call stating he is currently at [facility] getting a second opinion. He said he will make a follow up appt with you when everything is solved.

## 2018-10-10 ENCOUNTER — OFFICE VISIT (OUTPATIENT)
Dept: SPORTS MEDICINE | Facility: CLINIC | Age: 53
End: 2018-10-10

## 2018-10-10 VITALS
WEIGHT: 236.6 LBS | SYSTOLIC BLOOD PRESSURE: 144 MMHG | DIASTOLIC BLOOD PRESSURE: 92 MMHG | HEIGHT: 74 IN | BODY MASS INDEX: 30.36 KG/M2 | HEART RATE: 63 BPM | OXYGEN SATURATION: 99 %

## 2018-10-10 DIAGNOSIS — R10.31 CHRONIC BILATERAL LOWER ABDOMINAL PAIN: Primary | ICD-10-CM

## 2018-10-10 DIAGNOSIS — E78.2 MIXED HYPERLIPIDEMIA: ICD-10-CM

## 2018-10-10 DIAGNOSIS — K22.70 BARRETT'S ESOPHAGUS WITHOUT DYSPLASIA: ICD-10-CM

## 2018-10-10 DIAGNOSIS — K21.00 GASTROESOPHAGEAL REFLUX DISEASE WITH ESOPHAGITIS: ICD-10-CM

## 2018-10-10 DIAGNOSIS — G89.29 CHRONIC BILATERAL LOWER ABDOMINAL PAIN: Primary | ICD-10-CM

## 2018-10-10 DIAGNOSIS — K57.10 DIVERTICULOSIS OF SMALL INTESTINE WITHOUT HEMORRHAGE: ICD-10-CM

## 2018-10-10 DIAGNOSIS — R10.32 CHRONIC BILATERAL LOWER ABDOMINAL PAIN: Primary | ICD-10-CM

## 2018-10-10 DIAGNOSIS — I10 ESSENTIAL HYPERTENSION: ICD-10-CM

## 2018-10-10 PROBLEM — K21.9 GERD (GASTROESOPHAGEAL REFLUX DISEASE): Status: ACTIVE | Noted: 2018-09-18

## 2018-10-10 PROCEDURE — 99215 OFFICE O/P EST HI 40 MIN: CPT | Performed by: FAMILY MEDICINE

## 2018-10-10 RX ORDER — CHLORTHALIDONE 25 MG/1
25 TABLET ORAL DAILY
Qty: 90 TABLET | Refills: 1 | Status: SHIPPED | OUTPATIENT
Start: 2018-10-10 | End: 2021-02-19 | Stop reason: SDUPTHER

## 2018-10-10 RX ORDER — LISINOPRIL 20 MG/1
20 TABLET ORAL 2 TIMES DAILY
Qty: 180 TABLET | Refills: 1 | Status: SHIPPED | OUTPATIENT
Start: 2018-10-10 | End: 2019-04-24 | Stop reason: SDUPTHER

## 2018-10-10 RX ORDER — PANTOPRAZOLE SODIUM 40 MG/1
40 TABLET, DELAYED RELEASE ORAL DAILY
COMMUNITY
End: 2021-03-12

## 2018-10-10 RX ORDER — CLONAZEPAM 0.12 MG/1
0.12 TABLET, ORALLY DISINTEGRATING ORAL 2 TIMES DAILY PRN
COMMUNITY
End: 2019-01-14

## 2018-10-10 RX ORDER — ACETAMINOPHEN 325 MG/1
650 TABLET ORAL EVERY 6 HOURS PRN
COMMUNITY
End: 2019-01-14

## 2018-10-10 NOTE — PROGRESS NOTES
"Flako is a 53 y.o. year old male    Chief Complaint   Patient presents with   • Follow-up     Hospital       History of Present Illness   HPI     Went to [facility] with upper GI symptoms. Thought that he would need feeding tube but he chose to go to [facility] for their input. They did not recommend feeding tube, rather maximize medication. Here to discuss Rx list further as well.     Out of Rx for 3-4 wks: carvedilol (unsure if ever taking though listed on d/c summary from [facility]); Zofran; ranitidine 150 mg bid; chlorthalidone 25 mg; doxasozin 1 mg bid.    Going to PM 11/14/18. Taking Tylenol in interim.  Requests GI referral here. Was seeing Dr. Noriega ([facility]), Dr. Akash Ramirez ([facility]).  Would like to return to work in 10 d. Walked 2 miles yest but was sore due to inactivity. 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    I have reviewed the patient's medical, family, and social history in detail and updated the computerized patient record.    Review of Systems   Constitutional: Negative for chills, fatigue and fever.   HENT: Negative for postnasal drip and sore throat.    Eyes: Negative for pain.   Respiratory: Negative for cough, chest tightness and wheezing.    Cardiovascular: Negative for chest pain.   Gastrointestinal: Negative.    Musculoskeletal: Negative for myalgias.   Skin: Negative for rash.   Neurological: Negative for numbness and headaches.   Psychiatric/Behavioral: Negative.    All other systems reviewed and are negative.      /92   Pulse 63   Ht 188 cm (74\")   Wt 107 kg (236 lb 9.6 oz)   SpO2 99%   BMI 30.38 kg/m²      Physical Exam   Constitutional: He is oriented to person, place, and time. He appears well-developed and well-nourished.   HENT:   Head: Normocephalic and atraumatic.   Right Ear: External ear normal.   Left Ear: External ear normal.   Nose: Nose normal.   Mouth/Throat: Oropharynx is clear and moist.   Eyes: EOM are normal.   Neck: Normal " range of motion.   Cardiovascular: Normal rate and regular rhythm.    Pulmonary/Chest: Effort normal and breath sounds normal.   Neurological: He is alert and oriented to person, place, and time.   Skin: Skin is warm and dry. No rash noted.   Psychiatric: He has a normal mood and affect. His behavior is normal.   Nursing note and vitals reviewed.       Diagnoses and all orders for this visit:    Chronic bilateral lower abdominal pain    Lepe's esophagus without dysplasia    Gastroesophageal reflux disease with esophagitis    Diverticulosis of small intestine without hemorrhage  -     Ambulatory Referral to Gastroenterology    Essential hypertension  -     chlorthalidone (HYGROTON) 25 MG tablet; Take 1 tablet by mouth Daily.  -     lisinopril (PRINIVIL,ZESTRIL) 20 MG tablet; Take 1 tablet by mouth 2 (Two) Times a Day.    Mixed hyperlipidemia    Other orders  -     acetaminophen (TYLENOL) 325 MG tablet; Take 650 mg by mouth Every 6 (Six) Hours As Needed for Mild Pain .  -     pantoprazole (PROTONIX) 40 MG EC tablet; Take 40 mg by mouth Daily.  -     clonazePAM (KlonoPIN) 0.125 MG disintegrating tablet; Take 0.125 mg by mouth 2 (Two) Times a Day As Needed for Seizures.       Reviewed recent hospitalizations - [facility]. Will continue to optimize BP control - added back chlorthalidone. May need to add back doxasozin 1 mg bid. Update med list. Refer to GI for long-term management of Lepe's esophagus, abd pain, diverticulosis s/p partial colon resection. Pending appt with PM. Work note given to return 10/22/18.    Greater than 20 min of this 40 min appt spent face to face.      EMR Dragon/Transcription disclaimer:    Much of this encounter note is an electronic transcription/translation of spoken language to printed text.  The electronic translation of spoken language may permit erroneous, or at times, nonsensical words or phrases to be inadvertently transcribed.  Although I have reviewed the note for  such errors some may still exist.

## 2018-10-11 ENCOUNTER — TELEPHONE (OUTPATIENT)
Dept: SPORTS MEDICINE | Facility: CLINIC | Age: 53
End: 2018-10-11

## 2018-10-11 NOTE — TELEPHONE ENCOUNTER
Pt called stating the GI doctor contacted him regarding his appt, stated they cannot take him as a patient would like to know what to do. Please advise.

## 2018-10-17 DIAGNOSIS — F41.8 MIXED ANXIETY DEPRESSIVE DISORDER: ICD-10-CM

## 2018-10-17 RX ORDER — CITALOPRAM 10 MG/1
10 TABLET ORAL DAILY
Qty: 30 TABLET | Refills: 2 | OUTPATIENT
Start: 2018-10-17

## 2018-11-04 ENCOUNTER — HOSPITAL ENCOUNTER (EMERGENCY)
Facility: HOSPITAL | Age: 53
Discharge: HOME | End: 2018-11-04
Attending: EMERGENCY MEDICINE | Admitting: EMERGENCY MEDICINE

## 2018-11-04 VITALS
OXYGEN SATURATION: 99 % | HEART RATE: 76 BPM | HEIGHT: 74 IN | RESPIRATION RATE: 16 BRPM | SYSTOLIC BLOOD PRESSURE: 159 MMHG | TEMPERATURE: 97.9 F | WEIGHT: 230 LBS | DIASTOLIC BLOOD PRESSURE: 108 MMHG | BODY MASS INDEX: 29.52 KG/M2

## 2018-11-04 DIAGNOSIS — Z76.0 MEDICATION REFILL: ICD-10-CM

## 2018-11-04 DIAGNOSIS — Z87.19 HISTORY OF ESOPHAGITIS: ICD-10-CM

## 2018-11-04 DIAGNOSIS — G89.29 CHRONIC ABDOMINAL PAIN: Primary | ICD-10-CM

## 2018-11-04 DIAGNOSIS — R10.9 CHRONIC ABDOMINAL PAIN: Primary | ICD-10-CM

## 2018-11-04 LAB
HOLD SPECIMEN: NORMAL
HOLD SPECIMEN: NORMAL
WHOLE BLOOD HOLD SPECIMEN: NORMAL
WHOLE BLOOD HOLD SPECIMEN: NORMAL

## 2018-11-04 PROCEDURE — 99283 EMERGENCY DEPT VISIT LOW MDM: CPT

## 2018-11-04 RX ORDER — CARVEDILOL 3.12 MG/1
3.12 TABLET ORAL 2 TIMES DAILY WITH MEALS
COMMUNITY
End: 2021-02-19 | Stop reason: SDUPTHER

## 2018-11-04 RX ORDER — SUCRALFATE 1 G/1
1 TABLET ORAL 4 TIMES DAILY
COMMUNITY
End: 2021-03-12

## 2018-11-04 RX ORDER — ONDANSETRON 4 MG/1
4 TABLET, FILM COATED ORAL EVERY 8 HOURS PRN
Qty: 10 TABLET | Refills: 0 | Status: SHIPPED | OUTPATIENT
Start: 2018-11-04 | End: 2018-11-05 | Stop reason: SDUPTHER

## 2018-11-04 RX ORDER — SUCRALFATE ORAL 1 G/10ML
1 SUSPENSION ORAL
Qty: 420 ML | Refills: 0 | Status: SHIPPED | OUTPATIENT
Start: 2018-11-04 | End: 2018-11-05 | Stop reason: SDUPTHER

## 2018-11-04 RX ORDER — RANITIDINE 150 MG/1
150 TABLET ORAL 2 TIMES DAILY
COMMUNITY
End: 2021-03-12

## 2018-11-04 RX ORDER — VILAZODONE HYDROCHLORIDE 20 MG/1
20 TABLET ORAL DAILY
COMMUNITY
End: 2019-01-14

## 2018-11-04 RX ORDER — ONDANSETRON 4 MG/1
4 TABLET, FILM COATED ORAL EVERY 8 HOURS PRN
COMMUNITY
End: 2021-02-19

## 2018-11-04 NOTE — DISCHARGE INSTRUCTIONS
Call for GI follow-up; a name of Provider is in this packet    Return Precautions    Although you are being discharged from the ED today, I encourage you to return for worsening symptoms.  Things can, and do, change such that treatment at home with medication may not be adequate.      Specifically, return for any of the following:    Chest pain, shortness of breath, pain or nausea and vomiting not controlled by medications provided.    Please make a follow up with your Primary Care Provider for a blood pressure recheck.

## 2018-11-04 NOTE — ED PROVIDER NOTES
MD ATTESTATION NOTE    Pt presents to the ED complaining of worsening of his chronic abd pain over the last week. Pt states that he recently stopped taking Carafate because of trouble swallowing. On exam, the pt is A&Ox3. NAD, PERRL, moist mucous membranes. Abd is soft, non tender, non distended, bowel sounds positive. I agree with the plan to discharge the pt home with a prescription for liquid carafate and instructions to follow up with GI as scheduled.    The DON and I have discussed this patient's history, physical exam, and treatment plan.  I have reviewed the documentation and personally had a face to face interaction with the patient. I affirm the documentation and agree with the treatment and plan.  The attached note describes my personal findings.    Documentation assistance provided by cam Montiel for Dr. Mckeon.  Information recorded by the scribe was done at my direction and has been verified and validated by me.       Mirella Montiel  11/04/18 [id number]       Yohannes Mckeon MD  11/04/18 [id number]

## 2018-11-04 NOTE — ED NOTES
Pt arrives with bilateral lower abd pain for 6 months. He has been seen by [facility] for this already. States he's been nauseous for the last four days as well. Pt reports on Tuesday he had 3 beers and pizza and this made his abdominal pain worse.     Kiana Stubbs RN  11/04/18 0928       Margo Bell RN  11/04/18 [id number]

## 2018-11-04 NOTE — ED PROVIDER NOTES
"EMERGENCY DEPARTMENT ENCOUNTER    Room Number:  22/22  Date seen:  11/4/2018  Time seen: 10:06 AM  PCP: Akash Rodriguez Jr., DO    HPI:  Chief complaint:Abdominal Pain  Context:Flako Coreas is a 53 y.o. male who presents to the ED with c/o acute on chronic abd pain that became worse 3 days ago. Pt has been evaluated both at [facility] [place] and [facility] for abd pain and dx with esophagitis. He reports the pain is in the lower abd and has been taking placed for the past 6 months. Pt reports that he has also had nausea without vomiting or diarrhea. Pt states that sxs this time became exacerbated after eating pizza and drinking beer. He is prescribed Carafate, but has not been taking due to being unable to swallow pill.     Timing: waxing and waning  Duration: chronic over 6 months, worse since 3 days ago  Location: lower abd  Radiation:none  Quality: \"pain\"  Intensity/Severity:moderate  Associated Symptoms:nausea  Aggravating Factors:food  Alleviating Factors:none  Previous Episodes:hx of chronic abd pain  Treatment before arrival: seen at [place] and [facility]. Dx with esophagitis.     MEDICAL RECORD REVIEW  [facility] discharge summary:  Tomas Cunningham) - 09/28/2018 2:02 PM EDT  Formatting of this note may be different from the original.    DISCHARGE SUMMARY     PATIENT NAME: Flako Coreas ADMISSION DATE: 9/26/2018   MRN: [id number] DISCHARGE DATE: 9/28/2018     ATTENDING PHYSICIAN: Tomas Cunningham (M[place]) Code Status: Not on file    Highest Readmission Risk Score: 12   The 30 day readmissions risk score is derived from an internally validated risk model which evaluates patient level characteristics, utilization history, medication orders and lab results up until the day of discharge. Patients with a score of 40 or above are considered highest risk for readmission. Specific patient level drivers will be listed at the bottom of the summary.    REASON FOR HOSPITALIZATION: Abdominal pain     DIAGNOSIS: " Active Problems:  Abdominal pain  Diverticulitis  Essential hypertension  Mixed hyperlipidemia  Esophagitis  Resolved Problems:  * No resolved hospital problems. *        OPERATIONS DURING HOSPITALIZATION: None    PROCEDURES DURING HOSPITALIZATION: No procedures performed    HOSPITAL COURSE:     53 year old male from [place] with PMH of HTN, Lepe's, GERD admitted with abdominal pain.He has biopsy-proven Lepe's w/o metaplasia and his gastric antral biopsy was negative for H. Pylori. He was recently admitted in [place] and Dced after getting EGD which showed severe esophagitis. His recent work ups for abdominal pain - amylase, lipase - have been normal, CT abdomen and Chest have not show any acute changes.CTE, and NM gastric emptying studies which were all normal  As per Pt he was told in [place] that he needs PEG tube placed for feeding and he did not trust them so he left the hospital and came here. ( Please see discharge thomas from outside hospital with discharge date 09/25/2018.)    He was seen by GI service here - who agreed to continue PPI + Sucralfate, NO PEG was recommended. Close out pt GI f/u recomended. I offered him pain service f/u here with [facility] but he wants to f/u with MDs in his home town.     His other signifiant problem is labile BP. He Saw Cardiology here September 11, 2018 for labile BP And his w/u for pheo has been neg .        Assessment/Plan:   1. Abdominal Pain  - In setting of history of Lepe's esophagus  - Recent EGD last week at outside hosp  - Continue supportive care, PPI + sucralfate   - Cardiac causes ruled out - trop NEG ,   - Multiple CT's in past - all Neg for Acute changes , Amylase and Lipase - Normal   - Pt would like to f/u in [place] as its closer for him. I have advised him to f/u with fixed MD's ( PCP and GI) so that his care is not fragmented. He agrees for regular f/u in [place].     Of note, when I went to Pts room again to inform him that DC is complete , he  was walking around without any distress/pain/Problem     2. HTN  - Labile  - has seen cardiology in the past  - Has had negative workup for Pheo  - Borderline control with current medications: doxazosin, norvasc, lisinopril. Start small dose coreg( pt agreed)    3. Hx Diverticulitis  - Has lower belly pain, however it is chronic  - Benign exam  - CT from outside hospital noted and listed in this note.    4. Patient counseled to avoid self medicating with marijuana and adhere to PPI and treatment.    CONSULTING TEAMS DURING HOSPITALIZATION: GI     PATIENT CONDITION AT DISCHARGE: Stable    DISCHARGE DISPOSITION: Home/Self Care     AAO3  Heart: RRR, normal S1S2, no murmurs, gallops, or rubs.  Lungs: Equal breath sounds bilaterally, good aeration without rales, rhonchi or wheeze  Abdomen: soft, bowel sounds normally present, nontender, nondistended, no masses  Extremities: No clubbing, cyanosis, or edema.  Neurologic: alert, no focal motor deficit    DIET: Resume pre-hospital diet    ACTIVITY: as tolerated    ALLERGIES  No Known Allergies    DISCHARGE MEDICATION:  Current Discharge Medication List    START taking these medications    carvedilol (COREG) 3.125 mg  Take 3.125 mg by mouth twice daily with meals.    Qty: 60 tablet Refills: 1    sucralfate (CARAFATE) 1 g  Take 1 g by mouth four times daily.    Qty: 56 tablet Refills: 0    pantoprazole DR (PROTONIX) 40 mg  Take 40 mg by mouth twice daily before meals (0600/1600).    Qty: 60 tablet Refills: 0    amitriptyline (ELAVIL) 25 mg  Take 25 mg by mouth daily at bedtime.    Qty: 30 tablet Refills: 0    ondansetron orally disintegrating (ZOFRAN ODT) 4 mg  Take 4 mg by mouth every 4 hours as needed for Nausea/Vomiting.    Qty: 5 tablet Refills: 0    acetaminophen (TYLENOL) 650 mg  Take 650 mg by mouth every 4 hours as needed for Pain or Fever.    Qty: 50 tablet Refills: 0    ranitidine (ZANTAC) 150 mg  Take 150 mg by mouth twice daily.    Qty: 14 tablet Refills:  0    CONTINUE these medications which have NOT CHANGED    vilazodone (VIIBRYD) 10 mg  Take 10 mg by mouth daily with breakfast.    clonazePAM orally disintegrating (KlonoPIN WAFER) 0.125 mg  Take 0.125 mg by mouth three times daily as needed (Anxiety).    Qty: 60 tablet Refills: 0  Associated Diagnoses:RIRI (generalized anxiety disorder)    lisinopril (ZESTRIL, PRINIVIL) 20 mg  Take 20 mg by mouth twice daily.    Qty: 60 tablet Refills: 2    amLODIPine (NORVASC) 10 mg  Take 10 mg by mouth once daily.    Qty: 30 tablet Refills: 2    doxazosin (CARDURA) 1 mg  Take 1 mg by mouth daily at bedtime.    Multivitamin 1 capsule  Take 1 capsule by mouth once daily.    ergocalciferol (vitamin D2) (DRISDOL) 50,000 Units  Take 50,000 Units by mouth every Monday,Wednesday,Friday.    Qty: 12 capsule Refills: 2    therapeutic multivitamin (THERA VITAMIN) 1 tablet  Take 1 tablet by mouth once daily.    Qty: 30 tablet Refills: 2    chlorthalidone (HYGROTON) 25 mg  Take 25 mg by mouth once daily.    Qty: 30 tablet Refills: 2    FUTURE APPOINTMENTS:   PT WANTS TO F/U IN [place] WITH LOCAL PROVIDERS    The patient's risk for 30-day readmission is determined using the following contributing factors:   Pt variables contributing to increased readmission risk:     11 Most Recent BUN Result   9.7 First Resulted Calcium During Admission   7 Active Medication Orders   2 Number of Previous ED Visits (6 mos.)   1 Previous ED Visit (6 mos.)?   1 Insurance - Private Coverage   1 History of Anemia   1 Number of Hospitalizations (12 mos.)       TIME OF CARE: Discharge Management: I personally spent greater than 30 minutes involved in the discharge management of this patient.    Tomas Cunningham MD, FACP  Associate Staff,   Dept. Of Hospital Medicine  PAGER - [phone]             ALLERGIES  Patient has no known allergies.    PAST MEDICAL HISTORY  Active Ambulatory Problems     Diagnosis Date Noted   • Mixed anxiety depressive disorder 12/11/2012   •  Mixed hyperlipidemia 06/26/2017   • Essential hypertension 06/26/2017   • Diverticulosis 07/27/2018   • Nodule of spleen 06/27/2018   • Chronic bilateral lower abdominal pain 08/10/2018   • Lepe's esophagus without dysplasia 10/10/2018   • GERD (gastroesophageal reflux disease) 09/18/2018     Resolved Ambulatory Problems     Diagnosis Date Noted   • Abdominal pain 06/20/2018     Past Medical History:   Diagnosis Date   • Depression    • Diverticulitis    • GERD (gastroesophageal reflux disease)    • Hyperlipidemia    • Hypertension        PAST SURGICAL HISTORY  Past Surgical History:   Procedure Laterality Date   • COLON SURGERY         FAMILY HISTORY  Family History   Problem Relation Age of Onset   • No Known Problems Mother    • Stroke Father        SOCIAL HISTORY  Social History     Social History   • Marital status: Single     Spouse name: N/A   • Number of children: N/A   • Years of education: N/A     Occupational History   • Not on file.     Social History Main Topics   • Smoking status: Never Smoker   • Smokeless tobacco: Never Used   • Alcohol use Yes   • Drug use: No   • Sexual activity: Defer     Other Topics Concern   • Not on file     Social History Narrative   • No narrative on file       REVIEW OF SYSTEMS  Review of Systems   Constitutional: Negative for activity change, appetite change, diaphoresis and fever.   HENT: Negative for sore throat and trouble swallowing.    Eyes: Negative for visual disturbance.   Respiratory: Negative for cough, chest tightness, shortness of breath and wheezing.    Cardiovascular: Negative for chest pain, palpitations and leg swelling.   Gastrointestinal: Positive for abdominal pain and nausea. Negative for diarrhea and vomiting.   Genitourinary: Negative for dysuria.   Musculoskeletal: Negative for back pain.   Skin: Negative for rash.   Allergic/Immunologic: Negative for food allergies.   Neurological: Negative for dizziness, speech difficulty and light-headedness.        PHYSICAL EXAM  ED Triage Vitals   Temp Heart Rate Resp BP SpO2   11/04/18 0928 11/04/18 0928 11/04/18 0928 11/04/18 0934 11/04/18 0928   97.9 °F (36.6 °C) 94 16 (!) 189/100 99 %      Temp src Heart Rate Source Patient Position BP Location FiO2 (%)   11/04/18 0928 -- 11/04/18 0934 11/04/18 0934 --   Tympanic  Sitting Left arm      Physical Exam   Constitutional: He is oriented to person, place, and time and well-developed, well-nourished, and in no distress. No distress.   HENT:   Head: Normocephalic and atraumatic.   Mouth/Throat: Oropharynx is clear and moist. No posterior oropharyngeal erythema.   Eyes: Pupils are equal, round, and reactive to light.   Neck: Normal range of motion. Neck supple.   Cardiovascular: Normal rate, S1 normal, S2 normal and normal heart sounds.  Exam reveals no gallop and no friction rub.    No murmur heard.  Pulmonary/Chest: Effort normal. No respiratory distress. He has no wheezes. He has no rales. He exhibits no tenderness.   Abdominal: Soft. There is no rebound and no guarding.   Musculoskeletal: Normal range of motion. He exhibits no deformity.   Lymphadenopathy:     He has no cervical adenopathy.   Neurological: He is alert and oriented to person, place, and time.   Skin: Skin is warm and dry.   Psychiatric: Affect normal.   Nursing note and vitals reviewed.    PROCEDURES  Procedures    PROGRESS AND CONSULTS    Progress Notes:         10:22 AM Reviewed pt's history and workup with Dr. Mckeon.  After a bedside evaluation, Dr. Mckeon agrees with the plan of care. The patient's history, physical exam, and lab findings were discussed with the physician, who also performed a face to face history and physical exam.  I discussed all results and noted any abnormalities with patient.  Discussed absoute need to recheck abnormalities with their family physician.  I answered any of the patient's questions.  Discussed plan for discharge, as there is no emergent indication for admission.   "Pt is agreeable and understands need for follow up and repeat testing.  Pt is aware that discharge does not mean that nothing is wrong but it indicates no emergency is present and they must continue care with their family physician.  Pt is discharged with instructions to follow up with primary care doctor to have their blood pressure rechecked.          Disposition vitals:  BP (!) 159/108   Pulse 76   Temp 97.9 °F (36.6 °C) (Tympanic)   Resp 16   Ht 188 cm (74\")   Wt 104 kg (230 lb)   SpO2 99%   BMI 29.53 kg/m²       DIAGNOSIS  Final diagnoses:   Chronic abdominal pain   History of esophagitis   Medication refill       FOLLOW UP   Albert Gallo MD  [address] [address]  [phone]    Schedule an appointment as soon as possible for a visit         RX     Discharge Medications      Changes to Medications      Instructions Start Date   ondansetron 4 MG tablet  Commonly known as:  ZOFRAN  What changed:  Another medication with the same name was added. Make sure you understand how and when to take each.   4 mg, Oral, Every 8 Hours PRN      ondansetron 4 MG tablet  Commonly known as:  ZOFRAN  What changed:  You were already taking a medication with the same name, and this prescription was added. Make sure you understand how and when to take each.   4 mg, Oral, Every 8 Hours PRN      sucralfate 1 g tablet  Commonly known as:  CARAFATE  What changed:  Another medication with the same name was added. Make sure you understand how and when to take each.   1 g, Oral, 4 Times Daily      sucralfate 1 GM/10ML suspension  Commonly known as:  CARAFATE  What changed:  You were already taking a medication with the same name, and this prescription was added. Make sure you understand how and when to take each.   1 g, Oral, 4 Times Daily With Meals & Nightly         Continue These Medications      Instructions Start Date   acetaminophen 325 MG tablet  Commonly known as:  TYLENOL   650 mg, Oral, Every " 6 Hours PRN      amLODIPine 10 MG tablet  Commonly known as:  NORVASC   10 mg, Oral, Daily      carvedilol 3.125 MG tablet  Commonly known as:  COREG   3.125 mg, Oral, 2 Times Daily With Meals      chlorthalidone 25 MG tablet  Commonly known as:  HYGROTON   25 mg, Oral, Daily      clonazePAM 0.125 MG disintegrating tablet  Commonly known as:  KlonoPIN   0.125 mg, Oral, 2 Times Daily PRN      lisinopril 20 MG tablet  Commonly known as:  PRINIVIL,ZESTRIL   20 mg, Oral, 2 Times Daily      MULTIVITAMIN ADULT PO   1 capsule, Oral      pantoprazole 40 MG EC tablet  Commonly known as:  PROTONIX   40 mg, Oral, Daily      raNITIdine 150 MG tablet  Commonly known as:  ZANTAC   150 mg, Oral, 2 Times Daily      vilazodone 20 MG tablet tablet  Commonly known as:  VIIBRYD   20 mg, Oral, Daily      vitamin D 91930 units capsule capsule  Commonly known as:  ERGOCALCIFEROL   No dose, route, or frequency recorded.                 Documentation assistance provided by cam Coulter for JANEEN Alonso.  Information recorded by the eyalibfarshad was done at my direction and has been verified and validated by me.  Electronically signed by Rolando Coulter on 11/4/2018 at time 10:06 AM           Antonio Coulter  11/04/18 1033       Shannan Cox APRN  11/04/18 1046

## 2018-11-05 ENCOUNTER — HOSPITAL ENCOUNTER (EMERGENCY)
Facility: HOSPITAL | Age: 53
Discharge: HOME | End: 2018-11-05
Attending: EMERGENCY MEDICINE | Admitting: EMERGENCY MEDICINE

## 2018-11-05 ENCOUNTER — OFFICE VISIT (OUTPATIENT)
Dept: SPORTS MEDICINE | Facility: CLINIC | Age: 53
End: 2018-11-05

## 2018-11-05 VITALS
HEIGHT: 74 IN | DIASTOLIC BLOOD PRESSURE: 100 MMHG | SYSTOLIC BLOOD PRESSURE: 154 MMHG | OXYGEN SATURATION: 99 % | WEIGHT: 244 LBS | HEART RATE: 63 BPM | BODY MASS INDEX: 31.32 KG/M2

## 2018-11-05 VITALS
SYSTOLIC BLOOD PRESSURE: 148 MMHG | WEIGHT: 244 LBS | BODY MASS INDEX: 31.32 KG/M2 | HEART RATE: 65 BPM | HEIGHT: 74 IN | RESPIRATION RATE: 16 BRPM | TEMPERATURE: 97.9 F | DIASTOLIC BLOOD PRESSURE: 94 MMHG | OXYGEN SATURATION: 95 %

## 2018-11-05 DIAGNOSIS — I10 ESSENTIAL HYPERTENSION: Primary | ICD-10-CM

## 2018-11-05 DIAGNOSIS — R10.31 CHRONIC BILATERAL LOWER ABDOMINAL PAIN: ICD-10-CM

## 2018-11-05 DIAGNOSIS — G89.29 CHRONIC ABDOMINAL PAIN: Primary | ICD-10-CM

## 2018-11-05 DIAGNOSIS — R10.9 CHRONIC ABDOMINAL PAIN: Primary | ICD-10-CM

## 2018-11-05 DIAGNOSIS — G89.29 CHRONIC BILATERAL LOWER ABDOMINAL PAIN: ICD-10-CM

## 2018-11-05 DIAGNOSIS — K22.70 BARRETT'S ESOPHAGUS WITHOUT DYSPLASIA: ICD-10-CM

## 2018-11-05 DIAGNOSIS — R10.32 CHRONIC BILATERAL LOWER ABDOMINAL PAIN: ICD-10-CM

## 2018-11-05 LAB
ALBUMIN SERPL-MCNC: 4.2 G/DL (ref 3.5–5.2)
ALBUMIN/GLOB SERPL: 1.3 G/DL
ALP SERPL-CCNC: 73 U/L (ref 39–117)
ALT SERPL W P-5'-P-CCNC: 23 U/L (ref 1–41)
ANION GAP SERPL CALCULATED.3IONS-SCNC: 13.1 MMOL/L
AST SERPL-CCNC: 13 U/L (ref 1–40)
BASOPHILS # BLD AUTO: 0.03 10*3/MM3 (ref 0–0.2)
BASOPHILS NFR BLD AUTO: 0.6 % (ref 0–1.5)
BILIRUB SERPL-MCNC: 0.2 MG/DL (ref 0.1–1.2)
BILIRUB UR QL STRIP: NEGATIVE
BUN BLD-MCNC: 11 MG/DL (ref 6–20)
BUN/CREAT SERPL: 13.3 (ref 7–25)
CALCIUM SPEC-SCNC: 9.3 MG/DL (ref 8.6–10.5)
CHLORIDE SERPL-SCNC: 105 MMOL/L (ref 98–107)
CLARITY UR: ABNORMAL
CO2 SERPL-SCNC: 25.9 MMOL/L (ref 22–29)
COLOR UR: YELLOW
CREAT BLD-MCNC: 0.83 MG/DL (ref 0.76–1.27)
DEPRECATED RDW RBC AUTO: 40 FL (ref 37–54)
EOSINOPHIL # BLD AUTO: 0.78 10*3/MM3 (ref 0–0.7)
EOSINOPHIL NFR BLD AUTO: 14.4 % (ref 0.3–6.2)
ERYTHROCYTE [DISTWIDTH] IN BLOOD BY AUTOMATED COUNT: 13.9 % (ref 11.5–14.5)
GFR SERPL CREATININE-BSD FRML MDRD: 98 ML/MIN/1.73
GLOBULIN UR ELPH-MCNC: 3.2 GM/DL
GLUCOSE BLD-MCNC: 133 MG/DL (ref 65–99)
GLUCOSE UR STRIP-MCNC: NEGATIVE MG/DL
HCT VFR BLD AUTO: 40.3 % (ref 40.4–52.2)
HGB BLD-MCNC: 12.9 G/DL (ref 13.7–17.6)
HGB UR QL STRIP.AUTO: NEGATIVE
HOLD SPECIMEN: NORMAL
IMM GRANULOCYTES # BLD: 0.01 10*3/MM3 (ref 0–0.03)
IMM GRANULOCYTES NFR BLD: 0.2 % (ref 0–0.5)
KETONES UR QL STRIP: NEGATIVE
LEUKOCYTE ESTERASE UR QL STRIP.AUTO: NEGATIVE
LIPASE SERPL-CCNC: 65 U/L (ref 13–60)
LYMPHOCYTES # BLD AUTO: 1.54 10*3/MM3 (ref 0.9–4.8)
LYMPHOCYTES NFR BLD AUTO: 28.4 % (ref 19.6–45.3)
MCH RBC QN AUTO: 25.6 PG (ref 27–32.7)
MCHC RBC AUTO-ENTMCNC: 32 G/DL (ref 32.6–36.4)
MCV RBC AUTO: 80 FL (ref 79.8–96.2)
MONOCYTES # BLD AUTO: 0.44 10*3/MM3 (ref 0.2–1.2)
MONOCYTES NFR BLD AUTO: 8.1 % (ref 5–12)
NEUTROPHILS # BLD AUTO: 2.63 10*3/MM3 (ref 1.9–8.1)
NEUTROPHILS NFR BLD AUTO: 48.5 % (ref 42.7–76)
NITRITE UR QL STRIP: NEGATIVE
PH UR STRIP.AUTO: 7.5 [PH] (ref 5–8)
PLATELET # BLD AUTO: 255 10*3/MM3 (ref 140–500)
PMV BLD AUTO: 9.8 FL (ref 6–12)
POTASSIUM BLD-SCNC: 3.8 MMOL/L (ref 3.5–5.2)
PROT SERPL-MCNC: 7.4 G/DL (ref 6–8.5)
PROT UR QL STRIP: NEGATIVE
RBC # BLD AUTO: 5.04 10*6/MM3 (ref 4.6–6)
SODIUM BLD-SCNC: 144 MMOL/L (ref 136–145)
SP GR UR STRIP: 1.02 (ref 1–1.03)
UROBILINOGEN UR QL STRIP: ABNORMAL
WBC NRBC COR # BLD: 5.42 10*3/MM3 (ref 4.5–10.7)
WHOLE BLOOD HOLD SPECIMEN: NORMAL
WHOLE BLOOD HOLD SPECIMEN: NORMAL

## 2018-11-05 PROCEDURE — 96374 THER/PROPH/DIAG INJ IV PUSH: CPT

## 2018-11-05 PROCEDURE — 99284 EMERGENCY DEPT VISIT MOD MDM: CPT

## 2018-11-05 PROCEDURE — 25010000002 ONDANSETRON PER 1 MG: Performed by: NURSE PRACTITIONER

## 2018-11-05 PROCEDURE — 81003 URINALYSIS AUTO W/O SCOPE: CPT | Performed by: NURSE PRACTITIONER

## 2018-11-05 PROCEDURE — 83690 ASSAY OF LIPASE: CPT | Performed by: NURSE PRACTITIONER

## 2018-11-05 PROCEDURE — 80053 COMPREHEN METABOLIC PANEL: CPT | Performed by: NURSE PRACTITIONER

## 2018-11-05 PROCEDURE — 99215 OFFICE O/P EST HI 40 MIN: CPT | Performed by: FAMILY MEDICINE

## 2018-11-05 PROCEDURE — 25010000002 MORPHINE PER 10 MG: Performed by: NURSE PRACTITIONER

## 2018-11-05 PROCEDURE — 85025 COMPLETE CBC W/AUTO DIFF WBC: CPT | Performed by: NURSE PRACTITIONER

## 2018-11-05 PROCEDURE — 96375 TX/PRO/DX INJ NEW DRUG ADDON: CPT

## 2018-11-05 PROCEDURE — 96361 HYDRATE IV INFUSION ADD-ON: CPT

## 2018-11-05 RX ORDER — MORPHINE SULFATE 2 MG/ML
4 INJECTION, SOLUTION INTRAMUSCULAR; INTRAVENOUS ONCE
Status: COMPLETED | OUTPATIENT
Start: 2018-11-05 | End: 2018-11-05

## 2018-11-05 RX ORDER — DICYCLOMINE HCL 20 MG
20 TABLET ORAL EVERY 6 HOURS PRN
Qty: 24 TABLET | Refills: 0 | Status: SHIPPED | OUTPATIENT
Start: 2018-11-05 | End: 2021-03-12

## 2018-11-05 RX ORDER — ONDANSETRON 2 MG/ML
4 INJECTION INTRAMUSCULAR; INTRAVENOUS ONCE
Status: COMPLETED | OUTPATIENT
Start: 2018-11-05 | End: 2018-11-05

## 2018-11-05 RX ADMIN — SODIUM CHLORIDE 1000 ML: 9 INJECTION, SOLUTION INTRAVENOUS at 07:05

## 2018-11-05 RX ADMIN — MORPHINE SULFATE 4 MG: 2 INJECTION, SOLUTION INTRAMUSCULAR; INTRAVENOUS at 07:00

## 2018-11-05 RX ADMIN — ONDANSETRON 4 MG: 2 INJECTION INTRAMUSCULAR; INTRAVENOUS at 07:01

## 2018-11-05 NOTE — PROGRESS NOTES
"Flako is a 53 y.o. year old male    Chief Complaint   Patient presents with   • Follow-up     hospital BE       History of Present Illness   HPI     Has been to the ER twice over the past 2 days for similar complaint.  Has had worsening upper GI symptoms.  He has been taking Protonix daily, Zantac 150 mg at least once daily.  F/up with GI - Dr. Ramirez ([place]) - Weds 11/7/18 at 10 AM. Main c/o at ER for past 2 d has been upper GI symptoms. Stopped taking Carafate tablet which may have triggered this. Given liquid script yesterday but has not filled it yet. N/V since last Weds but slowly improving. ER added Bentyl.    HTN: no acute c/o. Has been elevated as today in past due to chronic pain.  Chronic abd pain: new pt appt with Dr. Lyman next Weds. Has had Botox done to splanchnic nerve bed at [facility].    I have reviewed the patient's medical, family, and social history in detail and updated the computerized patient record.    Review of Systems   Constitutional: Negative for chills, fatigue and fever.   HENT: Negative for postnasal drip and sore throat.    Eyes: Negative for pain.   Respiratory: Negative for cough, chest tightness and wheezing.    Cardiovascular: Negative for chest pain.   Gastrointestinal: Positive for abdominal pain, nausea and vomiting.   Musculoskeletal: Negative for myalgias.        Per HPI   Skin: Negative for rash.   Neurological: Negative for weakness, numbness and headaches.   Psychiatric/Behavioral: Negative.  Negative for sleep disturbance.   All other systems reviewed and are negative.      /100   Pulse 63   Ht 188 cm (74\")   Wt 111 kg (244 lb)   SpO2 99%   BMI 31.33 kg/m²      Physical Exam   Constitutional: He is oriented to person, place, and time. Vital signs are normal. He appears well-developed and well-nourished.   HENT:   Head: Normocephalic and atraumatic.   Eyes: Conjunctivae and EOM are normal.   Pulmonary/Chest: No accessory muscle usage. No respiratory " distress.   Abdominal: Normal appearance. There is generalized tenderness.   Musculoskeletal: He exhibits no deformity.   Neurological: He is alert and oriented to person, place, and time.   Skin: Skin is warm and dry. No rash noted.   Psychiatric: He has a normal mood and affect. His behavior is normal.   Nursing note and vitals reviewed.       Diagnoses and all orders for this visit:    Essential hypertension    Lepe's esophagus without dysplasia    Chronic bilateral lower abdominal pain       Reviewed ER workup including blood work and imaging.  History of same.  He has responded well to Botox injections by [facility] in the spring.  We tried to help expedite his pain management referral today as well as called the GI group with [facility] to see if they will be willing to see him.  His blood pressure has been well-controlled in past on current regimen when he is not in pain and he has no acute symptoms suggesting hypertensive urgency.  I did not change his blood pressure medications.    Greater than 20 minutes of this 40 minute encounter was spent face-to-face with the patient discussing treatment options of conditions as stated above.    EMR Dragon/Transcription disclaimer:    Much of this encounter note is an electronic transcription/translation of spoken language to printed text.  The electronic translation of spoken language may permit erroneous, or at times, nonsensical words or phrases to be inadvertently transcribed.  Although I have reviewed the note for such errors some may still exist.

## 2018-11-05 NOTE — ED PROVIDER NOTES
"EMERGENCY DEPARTMENT ENCOUNTER    CHIEF COMPLAINT  Chief Complaint: Abd pain  History given by:Pt  History limited by:Nothing  Time Seen: 0630  Room Number: 18/18  PMD: Akash Rodriguez Jr., DO      HPI:  Pt is a 53 y.o. male with a h/o GERD and diverticulitis who presents with generalized abd pain which is chronic and feels the same as it has in the past. The pt states that he has had abd workup at the [facility] and [facility], and states that the only relief he has had was a shot in his back in [place]. The pt confirms N/V/D, back pain, cough, congestion, and sore throat, and denies fever or chills. The pt has a h/o diverticulitis, and Dr. Noriega did small bowel resection in 01/2018. The pt states that it feels like his food is not going into his stomach, so that is why it hurts. The pt took Zantac last night without relief of pain.    The pt is currently looking for a new GI physician.     Duration: Chronic, worse over last few days  Timing:Constant  Location:Generalized abd  Radiation:None  Quality:\"pain\"  Intensity/Severity:Moderate  Progression:Worsening  Associated Symptoms:N/V/D, back pain, cough, congestion, sore throat  Aggravating Factors:Unknown  Alleviating Factors:None  Previous Episodes:The pt has a h/o similar abd pain.   Treatment before arrival:The pt took Zantac last night without relief.    PAST MEDICAL HISTORY  Active Ambulatory Problems     Diagnosis Date Noted   • Mixed anxiety depressive disorder 12/11/2012   • Mixed hyperlipidemia 06/26/2017   • Essential hypertension 06/26/2017   • Diverticulosis 07/27/2018   • Nodule of spleen 06/27/2018   • Chronic bilateral lower abdominal pain 08/10/2018   • Lepe's esophagus without dysplasia 10/10/2018   • GERD (gastroesophageal reflux disease) 09/18/2018     Resolved Ambulatory Problems     Diagnosis Date Noted   • Abdominal pain 06/20/2018     Past Medical History:   Diagnosis Date   • Depression    • Diverticulitis    • GERD " (gastroesophageal reflux disease)    • Hyperlipidemia    • Hypertension        PAST SURGICAL HISTORY  Past Surgical History:   Procedure Laterality Date   • COLON SURGERY         FAMILY HISTORY  Family History   Problem Relation Age of Onset   • No Known Problems Mother    • Stroke Father        SOCIAL HISTORY  Social History     Social History   • Marital status: Single     Spouse name: N/A   • Number of children: N/A   • Years of education: N/A     Occupational History   • Not on file.     Social History Main Topics   • Smoking status: Never Smoker   • Smokeless tobacco: Never Used   • Alcohol use Yes   • Drug use: No   • Sexual activity: Defer     Other Topics Concern   • Not on file     Social History Narrative   • No narrative on file         ALLERGIES  Patient has no known allergies.    REVIEW OF SYSTEMS  Review of Systems   Constitutional: Negative.  Negative for activity change, appetite change ( decreased), chills and fever.   HENT: Positive for congestion and sore throat. Negative for ear pain, rhinorrhea and sinus pressure.    Eyes: Negative.    Respiratory: Positive for cough. Negative for shortness of breath.    Cardiovascular: Negative.  Negative for chest pain, palpitations and leg swelling ( pedal).   Gastrointestinal: Positive for abdominal pain, diarrhea, nausea and vomiting.   Endocrine: Negative.    Genitourinary: Negative.  Negative for decreased urine volume, difficulty urinating, dysuria, frequency and urgency.   Musculoskeletal: Positive for back pain.   Skin: Negative.  Negative for rash.   Allergic/Immunologic: Negative.    Neurological: Negative.  Negative for dizziness, weakness, light-headedness, numbness and headaches.   Hematological: Negative.    Psychiatric/Behavioral: Negative.  The patient is not nervous/anxious.    All other systems reviewed and are negative.      PHYSICAL EXAM  ED Triage Vitals   Temp Heart Rate Resp BP SpO2   11/05/18 0513 11/05/18 0513 11/05/18 0513 11/05/18  0546 11/05/18 0513   97.9 °F (36.6 °C) 94 20 (!) 180/118 98 %      Temp src Heart Rate Source Patient Position BP Location FiO2 (%)   11/05/18 0513 -- -- -- --   Tympanic           Physical Exam   Constitutional: He is well-developed, well-nourished, and in no distress. No distress.   HENT:   Head: Normocephalic.   Mouth/Throat: Oropharynx is clear and moist and mucous membranes are normal.   Eyes: Pupils are equal, round, and reactive to light.   Neck: Normal range of motion.   Cardiovascular: Normal rate, regular rhythm and normal heart sounds.    Pulmonary/Chest: Effort normal and breath sounds normal. He has no wheezes.   Abdominal: Soft. Bowel sounds are normal. There is generalized tenderness.   Musculoskeletal: Normal range of motion. He exhibits no edema.   Neurological: He is alert.   Skin: Skin is warm and dry. No rash noted.   Psychiatric: Mood, memory, affect and judgment normal.   Nursing note and vitals reviewed.      LAB RESULTS  Recent Results (from the past 24 hour(s))   Light Blue Top    Collection Time: 11/04/18  9:43 AM   Result Value Ref Range    Extra Tube hold for add-on    Green Top (Gel)    Collection Time: 11/04/18  9:43 AM   Result Value Ref Range    Extra Tube Hold for add-ons.    Lavender Top    Collection Time: 11/04/18  9:43 AM   Result Value Ref Range    Extra Tube hold for add-on    Gold Top - SST    Collection Time: 11/04/18  9:43 AM   Result Value Ref Range    Extra Tube Hold for add-ons.    Light Blue Top    Collection Time: 11/05/18  6:30 AM   Result Value Ref Range    Extra Tube hold for add-on    Green Top (Gel)    Collection Time: 11/05/18  6:30 AM   Result Value Ref Range    Extra Tube Hold for add-ons.    Lavender Top    Collection Time: 11/05/18  6:30 AM   Result Value Ref Range    Extra Tube hold for add-on    Comprehensive Metabolic Panel    Collection Time: 11/05/18  6:30 AM   Result Value Ref Range    Glucose 133 (H) 65 - 99 mg/dL    BUN 11 6 - 20 mg/dL    Creatinine  0.83 0.76 - 1.27 mg/dL    Sodium 144 136 - 145 mmol/L    Potassium 3.8 3.5 - 5.2 mmol/L    Chloride 105 98 - 107 mmol/L    CO2 25.9 22.0 - 29.0 mmol/L    Calcium 9.3 8.6 - 10.5 mg/dL    Total Protein 7.4 6.0 - 8.5 g/dL    Albumin 4.20 3.50 - 5.20 g/dL    ALT (SGPT) 23 1 - 41 U/L    AST (SGOT) 13 1 - 40 U/L    Alkaline Phosphatase 73 39 - 117 U/L    Total Bilirubin 0.2 0.1 - 1.2 mg/dL    eGFR Non African Amer 98 >60 mL/min/1.73    Globulin 3.2 gm/dL    A/G Ratio 1.3 g/dL    BUN/Creatinine Ratio 13.3 7.0 - 25.0    Anion Gap 13.1 mmol/L   Lipase    Collection Time: 11/05/18  6:30 AM   Result Value Ref Range    Lipase 65 (H) 13 - 60 U/L   CBC Auto Differential    Collection Time: 11/05/18  6:30 AM   Result Value Ref Range    WBC 5.42 4.50 - 10.70 10*3/mm3    RBC 5.04 4.60 - 6.00 10*6/mm3    Hemoglobin 12.9 (L) 13.7 - 17.6 g/dL    Hematocrit 40.3 (L) 40.4 - 52.2 %    MCV 80.0 79.8 - 96.2 fL    MCH 25.6 (L) 27.0 - 32.7 pg    MCHC 32.0 (L) 32.6 - 36.4 g/dL    RDW 13.9 11.5 - 14.5 %    RDW-SD 40.0 37.0 - 54.0 fl    MPV 9.8 6.0 - 12.0 fL    Platelets 255 140 - 500 10*3/mm3    Neutrophil % 48.5 42.7 - 76.0 %    Lymphocyte % 28.4 19.6 - 45.3 %    Monocyte % 8.1 5.0 - 12.0 %    Eosinophil % 14.4 (H) 0.3 - 6.2 %    Basophil % 0.6 0.0 - 1.5 %    Immature Grans % 0.2 0.0 - 0.5 %    Neutrophils, Absolute 2.63 1.90 - 8.10 10*3/mm3    Lymphocytes, Absolute 1.54 0.90 - 4.80 10*3/mm3    Monocytes, Absolute 0.44 0.20 - 1.20 10*3/mm3    Eosinophils, Absolute 0.78 (H) 0.00 - 0.70 10*3/mm3    Basophils, Absolute 0.03 0.00 - 0.20 10*3/mm3    Immature Grans, Absolute 0.01 0.00 - 0.03 10*3/mm3   Urinalysis With Microscopic If Indicated (No Culture) - Urine, Clean Catch    Collection Time: 11/05/18  7:21 AM   Result Value Ref Range    Color, UA Yellow Yellow, Straw    Appearance, UA Turbid (A) Clear    pH, UA 7.5 5.0 - 8.0    Specific Gravity, UA 1.019 1.005 - 1.030    Glucose, UA Negative Negative    Ketones, UA Negative Negative     "Bilirubin, UA Negative Negative    Blood, UA Negative Negative    Protein, UA Negative Negative    Leuk Esterase, UA Negative Negative    Nitrite, UA Negative Negative    Urobilinogen, UA 0.2 E.U./dL 0.2 - 1.0 E.U./dL       I ordered the above labs and reviewed the results      PROGRESS AND CONSULTS    0749 Reviewed pt's history and workup with Dr. Escamilla.  After a bedside evaluation; Dr Escamilla agrees with the plan of care.    0754 Rechecked the pt, and discussed his unremarkable lab results. Discussed the plan to discharge the pt, and for him to f/u with GI physician. The pt states that he has an appointment with pain management in a week. The pt agrees with the plan and all questions were addressed.    COURSE & MEDICAL DECISION MAKING  Pertinent Labs and Imaging studies that were ordered and reviewed are noted above.  Results were reviewed/discussed with the patient and they were also made aware of online assess.   Pt also made aware that some labs, such as cultures, will not be resulted during ER visit and follow up with PMD is necessary.     MEDICATIONS GIVEN IN ER  Medications   sodium chloride 0.9 % bolus 1,000 mL (0 mL Intravenous Stopped 11/5/18 0802)   morphine injection 4 mg (4 mg Intravenous Given 11/5/18 0700)   ondansetron (ZOFRAN) injection 4 mg (4 mg Intravenous Given 11/5/18 0701)       /94 (BP Location: Right arm, Patient Position: Lying)   Pulse 65   Temp 97.9 °F (36.6 °C) (Tympanic)   Resp 16   Ht 188 cm (74\")   Wt 111 kg (244 lb)   SpO2 95%   BMI 31.33 kg/m²       Discussed all results and noted any abnormalities with patient.  Discussed absoute need to recheck abnormalities with PCP    Reviewed implications of results, diagnosis, meds, responsibility to follow up, warning signs and symptoms of possible worsening, potential complications and reasons to return to ER with patient    Discussed plan for discharge, as there is no emergent indication for admission.  Pt is agreeable and " understands need for follow up and repeat testing.  Pt is aware that discharge does not mean that nothing is wrong but it indicates no emergency is present and they must continue care with PCP and GI physician.  Pt is discharged with instructions to follow up with primary care doctor to have their blood pressure rechecked.       DIAGNOSIS  Final diagnoses:   Chronic abdominal pain       FOLLOW UP   Rodriguez Akash Seay , DO  [address]  [address]  [place] [address]  [phone]    Schedule an appointment as soon as possible for a visit       Albert Gallo MD  [address]  [place] [address]  [phone]    Schedule an appointment as soon as possible for a visit         RX     Medication List      New Prescriptions    dicyclomine 20 MG tablet  Commonly known as:  BENTYL  Take 1 tablet by mouth Every 6 (Six) Hours As Needed (abdominal pain).            I personally reviewed the past medical history, past surgical history, social history, family history, current medications and allergies as they appear in this chart.  The scribe's note accurately reflects the work and decisions made by me.       Documentation assistance provided by cam Rangel for JANEEN Roach.  Information recorded by the scribe was done at my direction and has been verified and validated by me.       Charu Rangel  11/05/18 1836       Charu Rangel  11/05/18 0750       Charu Rangel  11/05/18 0752       Zoila Schmitt APRN  11/05/18 4482

## 2018-11-07 ENCOUNTER — OFFICE VISIT (OUTPATIENT)
Dept: PAIN MEDICINE | Facility: CLINIC | Age: 53
End: 2018-11-07

## 2018-11-07 ENCOUNTER — OFFICE VISIT (OUTPATIENT)
Dept: SPORTS MEDICINE | Facility: CLINIC | Age: 53
End: 2018-11-07

## 2018-11-07 VITALS
WEIGHT: 237 LBS | BODY MASS INDEX: 30.42 KG/M2 | DIASTOLIC BLOOD PRESSURE: 118 MMHG | HEART RATE: 92 BPM | SYSTOLIC BLOOD PRESSURE: 168 MMHG | TEMPERATURE: 98 F | HEIGHT: 74 IN | OXYGEN SATURATION: 99 %

## 2018-11-07 VITALS
TEMPERATURE: 98.5 F | WEIGHT: 237.4 LBS | HEIGHT: 74 IN | DIASTOLIC BLOOD PRESSURE: 115 MMHG | RESPIRATION RATE: 15 BRPM | SYSTOLIC BLOOD PRESSURE: 160 MMHG | HEART RATE: 98 BPM | OXYGEN SATURATION: 94 % | BODY MASS INDEX: 30.47 KG/M2

## 2018-11-07 DIAGNOSIS — R10.32 CHRONIC BILATERAL LOWER ABDOMINAL PAIN: ICD-10-CM

## 2018-11-07 DIAGNOSIS — G89.29 OTHER CHRONIC PAIN: Primary | ICD-10-CM

## 2018-11-07 DIAGNOSIS — R10.31 CHRONIC BILATERAL LOWER ABDOMINAL PAIN: ICD-10-CM

## 2018-11-07 DIAGNOSIS — I10 ACCELERATED HYPERTENSION: ICD-10-CM

## 2018-11-07 DIAGNOSIS — K22.70 BARRETT'S ESOPHAGUS WITHOUT DYSPLASIA: Primary | ICD-10-CM

## 2018-11-07 DIAGNOSIS — G89.29 CHRONIC BILATERAL LOWER ABDOMINAL PAIN: ICD-10-CM

## 2018-11-07 PROCEDURE — 99212 OFFICE O/P EST SF 10 MIN: CPT | Performed by: NURSE PRACTITIONER

## 2018-11-07 PROCEDURE — 99215 OFFICE O/P EST HI 40 MIN: CPT | Performed by: FAMILY MEDICINE

## 2018-11-07 NOTE — PROGRESS NOTES
CHIEF COMPLAINT  New pt c/o abdominal pain which started in Sept 2017. States in the next four months diagnosed as the stomach flu. He had a CT and was diagnosed with diverticulitis. Had a small bowel resection in January (2 foot of small intestine removed) and has had pain ever since. He states his pain is getting worse in the right chest area. States he has to eat in small amounts or he will get sick and can't eat after 5 PM. Recently seen in the [facility] (less than 2 months ago) and had injections in his back which helped more than anything. He states it was dramatic, taking his pain from an 8 to a 2/10 and lasted for maybe 3-4 weeks. He states it was done with botox. He states they could make it a permanent solution by burning the nerves. States he received fentanyl at [facility]. In [place] he was on dilaudid (for surgery and states he has been there ten times this year). Currently taking tylenol. On Sunday he went to [facility] ER with pain and was given morphine.     Subjective   Flako Coreas is a 53 y.o. male.   He presents to the office for evaluation of chronic abdominal pain. He was referred here by Dr. Rodriguez.     Patient reports abdominal pain which started in September of last year.  He was then diagnosed with diverticulitis and underwent a small bowel resection in January of this year.  He reports that he is chronic pain ever since this time.  He has had extensive gastrointestinal workup including evaluation of the [facility] and has been diagnosed with esophagitis and GERD.  He evidently underwent splanchnic nerve injections with Botox while at the [facility] which did offer pain relief for 3-4 weeks.  He was told that it was possible to have these nerves burned for longer lasting benefit.  The patient opted to continue his care in [place].  He has had multiple emergency department visits over the last month due to abdominal pain.  Again all workup has been relatively  benign.  He is to continue care with gastroenterology at [facility].  Presents today for interventional consideration and states that he is not interested in opioid therapy.    History of Present Illness     PEG Assessment   What number best describes your pain on average in the past week?8  What number best describes how, during the past week, pain has interfered with your enjoyment of life?10  What number best describes how, during the past week, pain has interfered with your general activity?  10      Current Outpatient Prescriptions:   •  acetaminophen (TYLENOL) 325 MG tablet, Take 650 mg by mouth Every 6 (Six) Hours As Needed for Mild Pain ., Disp: , Rfl:   •  amLODIPine (NORVASC) 10 MG tablet, Take 1 tablet by mouth Daily., Disp: 90 tablet, Rfl: 1  •  carvedilol (COREG) 3.125 MG tablet, Take 3.125 mg by mouth 2 (Two) Times a Day With Meals., Disp: , Rfl:   •  chlorthalidone (HYGROTON) 25 MG tablet, Take 1 tablet by mouth Daily., Disp: 90 tablet, Rfl: 1  •  clonazePAM (KlonoPIN) 0.125 MG disintegrating tablet, Take 0.125 mg by mouth 2 (Two) Times a Day As Needed for Seizures., Disp: , Rfl:   •  lisinopril (PRINIVIL,ZESTRIL) 20 MG tablet, Take 1 tablet by mouth 2 (Two) Times a Day., Disp: 180 tablet, Rfl: 1  •  Multiple Vitamins-Minerals (MULTIVITAMIN ADULT PO), Take 1 capsule by mouth., Disp: , Rfl:   •  ondansetron (ZOFRAN) 4 MG tablet, Take 4 mg by mouth Every 8 (Eight) Hours As Needed for Nausea or Vomiting., Disp: , Rfl:   •  pantoprazole (PROTONIX) 40 MG EC tablet, Take 40 mg by mouth Daily., Disp: , Rfl:   •  raNITIdine (ZANTAC) 150 MG tablet, Take 150 mg by mouth 2 (Two) Times a Day., Disp: , Rfl:   •  sucralfate (CARAFATE) 1 g tablet, Take 1 g by mouth 4 (Four) Times a Day., Disp: , Rfl:   •  vilazodone (VIIBRYD) 20 MG tablet tablet, Take 20 mg by mouth Daily., Disp: , Rfl:   •  dicyclomine (BENTYL) 20 MG tablet, Take 1 tablet by mouth Every 6 (Six) Hours As Needed (abdominal pain)., Disp: 24 tablet, Rfl:  0    The following portions of the patient's history were reviewed and updated as appropriate: allergies, current medications, past family history, past medical history, past social history, past surgical history and problem list.    REVIEW OF PERTINENT MEDICAL DATA  View the office visit encounter from 11/5/2018 with Dr. Akash Rodriguez the patient's primary care provider.  The patient had been the EEG 2 days prior to this visit with complaints of abdominal pain.  Patient was apparently seen at the [facility] in September of this year and had Botox injections done to the splanchnic nerve bed.  He reported significant relief from this for 3-4 weeks and was told that it was possible to have this nerve burned.    Reviewed discharge summary from the [facility]'s dated 8/22/2018.  And for admission is chronic abdominal pain.  Patient has history of Lepe's esophagus, GERD.  EGD showed severe esophagitis.  Recent workup for abdominal pain including amylase and lipase have been normal, CT abdomen chest have not shown any acute changes.  Gastric emptying studies were normal.  Recommended continuation of proton pump inhibitor and Carafate as well as close outpatient GI follow-up.  Patient was counseled to avoid self-medicating with marijuana.          Review of Systems   Constitutional: Positive for fatigue. Negative for chills and fever.   Eyes: Positive for visual disturbance (blurry vision).   Respiratory: Negative for apnea, cough, shortness of breath and wheezing.    Cardiovascular: Negative for chest pain (pt states indigestion pain).   Gastrointestinal: Positive for constipation, diarrhea, nausea and vomiting.   Genitourinary: Negative for difficulty urinating.   Neurological: Positive for weakness and headaches. Negative for dizziness, light-headedness and numbness.   Psychiatric/Behavioral: Positive for sleep disturbance. Negative for behavioral problems, confusion, hallucinations and suicidal ideas.  "    Vitals:    11/07/18 1418 11/07/18 1423 11/07/18 1442 11/07/18 1446   BP: (!) 160/128  Comment: left arm (!) 188/114  Comment: right arm (!) 173/120  Comment: left arm (!) 160/115  Comment: left arm manual   Pulse: 98      Resp: 15      Temp: 98.5 °F (36.9 °C)      SpO2: 94%      Weight: 108 kg (237 lb 6.4 oz)      Height: 188 cm (74.02\")      PainSc:   3      PainLoc: Abdomen        Objective   Physical Exam   Constitutional: He is oriented to person, place, and time. He appears well-developed and well-nourished. No distress.   HENT:   Head: Atraumatic.   Eyes: Conjunctivae are normal.   Cardiovascular: Normal rate.    Pulmonary/Chest: Effort normal. No respiratory distress.   Neurological: He is alert and oriented to person, place, and time. Gait normal.   Skin: Skin is warm and dry.   Psychiatric: He exhibits a depressed mood.   tearful   Nursing note and vitals reviewed.    Assessment/Plan   Flako was seen today for abdominal pain.    Diagnoses and all orders for this visit:    Other chronic pain    Chronic bilateral lower abdominal pain      --- Discuss the case and plan of care with Dr. Lyman.  Unfortunately Botox injections of the splanchnic nerve is not a procedure that is offered here due to the experimental nature of this.  Furthermore we do not recommend radiofrequency ablation of the splanchnic nerve or celiac plexus in the non-terminal patient due to the risks associated with these procedures.  We unfortunately do not have anything from an interventional standpoint to offer her recommend for this patient.  He is not interested in opioid therapy but would not be a good candidate given the unconfirmed pathology for cause of pain as well as his intermittent marijuana use.    The patient was very upset. He stated \"I guess I am just going to have to kill myself\".  I told the patient that if he was thinking of killing himself we would need to call the ambulance immediately.  The patient denied suicidal " ideation and declined to have the EMS called.           NILE REPORT    NILE report has been reviewed and scanned into the patient's chart.    As the clinician, I personally reviewed the NILE from 11/6/2018 while the patient was in the office today.    EMR Dragon/Transcription disclaimer:   Much of this encounter note is an electronic transcription/translation of spoken language to printed text. The electronic translation of spoken language may permit erroneous, or at times, nonsensical words or phrases to be inadvertently transcribed; Although I have reviewed the note for such errors, some may still exist.

## 2018-11-07 NOTE — PROGRESS NOTES
"Flako is a 53 y.o. year old male    Chief Complaint   Patient presents with   • Chest Pain       History of Present Illness   HPI     Here for follow-up for same complaining of Lepe's esophagus, uncontrolled chronic abdominal pain and accelerated hypertension.  Frustrated as he did not get an answer he was anticipating with pain management referral today.  He is having pain to the point that he is having suicidal ideation.  \"I'm done with this\".  \"I hope my blood pressure goes high enough so that I don't have to do this my own\".  The only thing that seemed to have given him any type of relief was Botox injections performed at [facility].  Unfortunately, these were done only 2 months ago.  Has had difficulty getting in to see gastroenterologist locally since he went to [facility].    I have reviewed the patient's medical, family, and social history in detail and updated the computerized patient record.    Review of Systems   Constitutional: Negative for chills, fatigue and fever.   HENT: Negative for postnasal drip and sore throat.    Eyes: Negative for pain.   Respiratory: Negative for cough, chest tightness and wheezing.    Cardiovascular: Negative for chest pain.   Gastrointestinal: Positive for abdominal pain.   Musculoskeletal: Negative for myalgias.   Skin: Negative for rash.   Neurological: Negative for numbness and headaches.   Psychiatric/Behavioral: Positive for sleep disturbance and suicidal ideas.   All other systems reviewed and are negative.      BP (!) 168/118 (BP Location: Right arm, Patient Position: Lying)   Pulse 92   Temp 98 °F (36.7 °C)   Ht 188 cm (74\")   Wt 108 kg (237 lb)   SpO2 99%   BMI 30.43 kg/m²      Physical Exam   Constitutional: He is oriented to person, place, and time. Vital signs are normal. He appears well-developed and well-nourished.   HENT:   Head: Normocephalic and atraumatic.   Eyes: Conjunctivae and EOM are normal.   Pulmonary/Chest: No accessory muscle " usage. No respiratory distress.   Musculoskeletal: He exhibits no deformity.   Neurological: He is alert and oriented to person, place, and time.   Skin: Skin is warm and dry. No rash noted.   Psychiatric: His behavior is normal. His affect is angry. He exhibits a depressed mood.   Nursing note and vitals reviewed.       Diagnoses and all orders for this visit:    Lepe's esophagus without dysplasia    Chronic bilateral lower abdominal pain    Accelerated hypertension       Explained described that given his suicidal ideation, I would need to call EMS, however he declines this.  I will do my best to see if other specialists could help us in his case and even contemplating surgery given the extent of his disease and his small bowel.  Have reviewed most recent CAT scan from [facility] once again.  He does have some nodules in the spleen though these do not appear malignant base our radiologist interpretation.    After encounter, I did contact one of our general surgeons to see if they will be willing to review his case.  I also contacted the [facility] on-call physician for the GI group who will relay message to his gastroenterologist on service when he was admitted there.  I will contact a motility specialist in [place] with [facility] tomorrow.    EMR Dragon/Transcription disclaimer:    Much of this encounter note is an electronic transcription/translation of spoken language to printed text.  The electronic translation of spoken language may permit erroneous, or at times, nonsensical words or phrases to be inadvertently transcribed.  Although I have reviewed the note for such errors some may still exist.

## 2018-11-08 ENCOUNTER — TELEPHONE (OUTPATIENT)
Dept: SPORTS MEDICINE | Facility: CLINIC | Age: 53
End: 2018-11-08

## 2018-11-08 DIAGNOSIS — F41.8 MIXED ANXIETY DEPRESSIVE DISORDER: Primary | ICD-10-CM

## 2018-11-08 DIAGNOSIS — K21.00 GASTROESOPHAGEAL REFLUX DISEASE WITH ESOPHAGITIS: ICD-10-CM

## 2018-11-08 RX ORDER — VENLAFAXINE 50 MG/1
50 TABLET ORAL DAILY
Qty: 30 TABLET | Refills: 0 | Status: SHIPPED | OUTPATIENT
Start: 2018-11-08 | End: 2018-12-10 | Stop reason: SDUPTHER

## 2018-11-08 NOTE — TELEPHONE ENCOUNTER
Spoke with Flako this morning. Having difficulty keeping Rx down due to pain. Discussed possibility of going to  ER to see if they can help. Also have contacted GI Motility clinic at  ([phone]) and discussed case with Dr. Tracey Castro who is willing to see Flako w/in 10 d. They will contact him. Recommended trial of Effexor and Flako is willing to try.

## 2018-11-12 ENCOUNTER — TELEPHONE (OUTPATIENT)
Dept: SPORTS MEDICINE | Facility: CLINIC | Age: 53
End: 2018-11-12

## 2018-11-12 NOTE — TELEPHONE ENCOUNTER
Brother, Pk, called this morning concerned about Flako. Pk is on HIPAA release form. We discussed current treatment plan and Pk's concerns. Flako is to keep appts as requested by [facility].

## 2018-11-13 ENCOUNTER — TELEPHONE (OUTPATIENT)
Dept: SPORTS MEDICINE | Facility: CLINIC | Age: 53
End: 2018-11-13

## 2018-11-13 NOTE — TELEPHONE ENCOUNTER
Update regarding the last message Dr. Jairon Ramirez, EGD performed 09/19/18 at [facility], PT would like you to review this due to same symptoms he is having now.    I called Flako last night and discussed his symptoms as well as the findings on the EGD which were consistent with Barretts esophagus.  Called in Magic mouthwash.  He is scheduled to [facility] next Monday.  [facility]

## 2018-11-13 NOTE — TELEPHONE ENCOUNTER
Pt called and would like leave paperwork completed, also requesting you view the EGD performed on 09/22 from Dr. Martell, he is experiencing the same symptoms and would like you input feels like it it related. Food is not digesting.

## 2018-11-14 ENCOUNTER — TELEPHONE (OUTPATIENT)
Dept: SPORTS MEDICINE | Facility: CLINIC | Age: 53
End: 2018-11-14

## 2018-11-14 NOTE — TELEPHONE ENCOUNTER
Attempted to call but no answer. Please call back and see if you can get more clarification. Also, for his paperwork, I need to know if he has returned to work or has been off since last visit with me.

## 2018-11-14 NOTE — TELEPHONE ENCOUNTER
Pt called stating he would like for you to contact him to go over his medication. Please advise, thank you.

## 2018-11-14 NOTE — TELEPHONE ENCOUNTER
"Called and spoke with Pt regarding leave paperwork and medications, he stated he is confused as to which meds he is supposed to be on and concerned with the different meds from different facilities. Regarding his paper work he states he worked about 7 to 10 days straight he is pretty sure the last week he worked was end of October. He stated he was frustrated with Dr. Ramirez not being able to see him and stated \"when I die I told my family to kill Dr. Ramirez or I will\". I explained to him I was sorry for the pain and troubles that he has jacob through and he proceeded to say \"Sorry doesn't fucking cut it, I am tired of being in pain no one wants to help me I don't understand how a doctor just can't see you.\" He said sorry and thank you and hung up the phone.   "

## 2018-11-19 ENCOUNTER — TELEPHONE (OUTPATIENT)
Dept: SPORTS MEDICINE | Facility: CLINIC | Age: 53
End: 2018-11-19

## 2018-11-19 NOTE — TELEPHONE ENCOUNTER
Pt called stating he was admitted to hospital last week for 2 weeks, they changed b/p meds, attending appt today in [place] and also need leave paperwork completed.

## 2018-11-21 ENCOUNTER — TELEPHONE (OUTPATIENT)
Dept: SPORTS MEDICINE | Facility: CLINIC | Age: 53
End: 2018-11-21

## 2018-11-21 NOTE — TELEPHONE ENCOUNTER
Pt called stating he is unable to keep his medication down would like to get through the holiday weekend, please advise.

## 2018-11-26 RX ORDER — PROMETHAZINE HYDROCHLORIDE 12.5 MG/1
TABLET ORAL
Qty: 20 TABLET | Refills: 0 | Status: SHIPPED | OUTPATIENT
Start: 2018-11-26 | End: 2021-02-19

## 2018-11-26 NOTE — TELEPHONE ENCOUNTER
Informed pt, he stated the bleeding has stopped and wound is closed, also he might take the medication sent in but he is up to 38 pills a day and not sure but very thankful for the help.

## 2018-11-26 NOTE — TELEPHONE ENCOUNTER
Was out of office when this was sent. If you don't mind, can you call and see how he is doing today?

## 2018-11-26 NOTE — TELEPHONE ENCOUNTER
Passed out this morning, busted head open, does not want to go to ER. Sick over the weekend, felt better Sunday during the day then returned Sunday evening with tiredness and feels exhausted.

## 2018-11-26 NOTE — TELEPHONE ENCOUNTER
If he has large open wound on head, I recommend ER to close it as I don't have ability to do so here.

## 2018-11-29 ENCOUNTER — TELEPHONE (OUTPATIENT)
Dept: SPORTS MEDICINE | Facility: CLINIC | Age: 53
End: 2018-11-29

## 2018-11-29 DIAGNOSIS — R10.9 CHRONIC ABDOMINAL PAIN: Primary | ICD-10-CM

## 2018-11-29 DIAGNOSIS — G89.29 CHRONIC ABDOMINAL PAIN: Primary | ICD-10-CM

## 2018-11-30 ENCOUNTER — TELEPHONE (OUTPATIENT)
Dept: SPORTS MEDICINE | Facility: CLINIC | Age: 53
End: 2018-11-30

## 2018-12-03 ENCOUNTER — CLINICAL SUPPORT (OUTPATIENT)
Dept: SPORTS MEDICINE | Facility: CLINIC | Age: 53
End: 2018-12-03

## 2018-12-03 VITALS — SYSTOLIC BLOOD PRESSURE: 138 MMHG | DIASTOLIC BLOOD PRESSURE: 94 MMHG

## 2018-12-05 ENCOUNTER — RESULTS ENCOUNTER (OUTPATIENT)
Dept: SPORTS MEDICINE | Facility: CLINIC | Age: 53
End: 2018-12-05

## 2018-12-05 DIAGNOSIS — G89.29 CHRONIC ABDOMINAL PAIN: ICD-10-CM

## 2018-12-05 DIAGNOSIS — R10.9 CHRONIC ABDOMINAL PAIN: ICD-10-CM

## 2018-12-06 ENCOUNTER — OFFICE VISIT (OUTPATIENT)
Dept: SPORTS MEDICINE | Facility: CLINIC | Age: 53
End: 2018-12-06

## 2018-12-06 VITALS
BODY MASS INDEX: 28.93 KG/M2 | HEART RATE: 66 BPM | WEIGHT: 225.4 LBS | HEIGHT: 74 IN | DIASTOLIC BLOOD PRESSURE: 102 MMHG | OXYGEN SATURATION: 98 % | SYSTOLIC BLOOD PRESSURE: 140 MMHG

## 2018-12-06 DIAGNOSIS — I10 ACCELERATED HYPERTENSION: ICD-10-CM

## 2018-12-06 DIAGNOSIS — K22.70 BARRETT'S ESOPHAGUS WITHOUT DYSPLASIA: ICD-10-CM

## 2018-12-06 DIAGNOSIS — M79.10 MYALGIA: ICD-10-CM

## 2018-12-06 DIAGNOSIS — G89.29 CHRONIC ABDOMINAL PAIN: ICD-10-CM

## 2018-12-06 DIAGNOSIS — R10.9 CHRONIC ABDOMINAL PAIN: ICD-10-CM

## 2018-12-06 DIAGNOSIS — R41.3 MEMORY LOSS: Primary | ICD-10-CM

## 2018-12-06 DIAGNOSIS — F41.8 MIXED ANXIETY DEPRESSIVE DISORDER: ICD-10-CM

## 2018-12-06 DIAGNOSIS — R45.86 LABILE MOOD: ICD-10-CM

## 2018-12-06 LAB
ENDOMYSIUM IGA SER QL: NEGATIVE
IGA SERPL-MCNC: NORMAL MG/DL
SPECIMEN STATUS: NORMAL
TTG IGA SER-ACNC: <2 U/ML (ref 0–3)

## 2018-12-06 PROCEDURE — 99214 OFFICE O/P EST MOD 30 MIN: CPT | Performed by: FAMILY MEDICINE

## 2018-12-06 NOTE — PROGRESS NOTES
"Flako is a 53 y.o. year old male    Chief Complaint   Patient presents with   • Follow-up       History of Present Illness   HPI     Here for concerns about memory loss and labile mood.  \"I feel like I have yelled at everybody in my life over the past year\".  He is obviously frustrated as the ongoing abdominal pain is been a major stressor in his life but he is more concerned about having difficulty remembering names as well as being able to follow stories.  Unsure if this has any relation to what is been ongoing with his chronic health conditions.  He relates that he has had difficulty remembering my name.  Does not associated headaches.  He does associate blurred vision but unsure if this is related to his labile blood pressure.    He has point with psychiatry for follow-up next week.  He has new patient appointment with you about gastroenterology on [facility].  He has follow-up with  gastroenterology within 4 weeks.    I have reviewed the patient's medical, family, and social history in detail and updated the computerized patient record.    Review of Systems   Constitutional: Negative for chills, fatigue and fever.   HENT: Negative for postnasal drip and sore throat.    Eyes: Negative for pain.   Respiratory: Negative for cough, chest tightness and wheezing.    Cardiovascular: Negative for chest pain.   Gastrointestinal: Negative.    Musculoskeletal: Negative for myalgias.   Skin: Negative for rash.   Neurological: Negative for numbness and headaches.   Psychiatric/Behavioral: Positive for behavioral problems, confusion and dysphoric mood.   All other systems reviewed and are negative.      BP (!) 140/102   Pulse 66   Ht 188 cm (74\")   Wt 102 kg (225 lb 6.4 oz)   SpO2 98%   BMI 28.94 kg/m²      Physical Exam   Constitutional: He is oriented to person, place, and time. He appears well-developed and well-nourished.   HENT:   Head: Normocephalic and atraumatic.   Right Ear: External ear normal.   Left " Ear: External ear normal.   Nose: Nose normal.   Mouth/Throat: Oropharynx is clear and moist.   Eyes: EOM are normal.   Neck: Normal range of motion.   Cardiovascular: Normal rate and regular rhythm.   Pulmonary/Chest: Effort normal and breath sounds normal.   Neurological: He is alert and oriented to person, place, and time. He has normal strength. No cranial nerve deficit or sensory deficit.   Reflex Scores:       Bicep reflexes are 2+ on the right side and 2+ on the left side.       Brachioradialis reflexes are 2+ on the right side and 2+ on the left side.       Patellar reflexes are 2+ on the right side and 2+ on the left side.       Achilles reflexes are 2+ on the right side and 2+ on the left side.  Skin: Skin is warm and dry. No rash noted.   Psychiatric: He has a normal mood and affect. His behavior is normal.   Nursing note and vitals reviewed.       Diagnoses and all orders for this visit:    Memory loss  -     MRI brain wo contrast; Future    Myalgia  -     MRI brain wo contrast; Future    Labile mood  -     MRI brain wo contrast; Future    Mixed anxiety depressive disorder    Chronic abdominal pain    Lepe's esophagus without dysplasia    Accelerated hypertension         Explained to Flako that we may or may not be able to see any pathology but given his present in complaint, MRI of his brain certainly reasonable.  Will order and I'll call with results.    Continue to take blood pressure medications as written.  Keep appointments with specialist.    EMR Dragon/Transcription disclaimer:    Much of this encounter note is an electronic transcription/translation of spoken language to printed text.  The electronic translation of spoken language may permit erroneous, or at times, nonsensical words or phrases to be inadvertently transcribed.  Although I have reviewed the note for such errors some may still exist.

## 2018-12-07 ENCOUNTER — TELEPHONE (OUTPATIENT)
Dept: SPORTS MEDICINE | Facility: CLINIC | Age: 53
End: 2018-12-07

## 2018-12-10 DIAGNOSIS — I10 ESSENTIAL HYPERTENSION: ICD-10-CM

## 2018-12-10 RX ORDER — HYDROCHLOROTHIAZIDE 25 MG/1
25 TABLET ORAL DAILY
Qty: 90 TABLET | Refills: 1 | OUTPATIENT
Start: 2018-12-10

## 2018-12-10 RX ORDER — VENLAFAXINE 50 MG/1
TABLET ORAL
Qty: 30 TABLET | Refills: 0 | Status: SHIPPED | OUTPATIENT
Start: 2018-12-10 | End: 2019-01-13 | Stop reason: SDUPTHER

## 2018-12-18 ENCOUNTER — APPOINTMENT (OUTPATIENT)
Dept: MRI IMAGING | Facility: HOSPITAL | Age: 53
End: 2018-12-18

## 2018-12-19 ENCOUNTER — TELEPHONE (OUTPATIENT)
Dept: SPORTS MEDICINE | Facility: CLINIC | Age: 53
End: 2018-12-19

## 2018-12-19 NOTE — TELEPHONE ENCOUNTER
Pt called to cancel office visit scheduled for today. He is currently admitted to the hospital. Unsure of discharge date.

## 2018-12-24 ENCOUNTER — HOSPITAL ENCOUNTER (OUTPATIENT)
Dept: MRI IMAGING | Facility: HOSPITAL | Age: 53
Discharge: HOME | End: 2018-12-24
Admitting: FAMILY MEDICINE

## 2018-12-24 DIAGNOSIS — R45.86 LABILE MOOD: ICD-10-CM

## 2018-12-24 DIAGNOSIS — R41.3 MEMORY LOSS: ICD-10-CM

## 2018-12-24 DIAGNOSIS — M79.10 MYALGIA: ICD-10-CM

## 2018-12-24 PROCEDURE — 70551 MRI BRAIN STEM W/O DYE: CPT

## 2019-01-14 ENCOUNTER — OFFICE VISIT (OUTPATIENT)
Dept: SPORTS MEDICINE | Facility: CLINIC | Age: 54
End: 2019-01-14

## 2019-01-14 VITALS
HEART RATE: 82 BPM | DIASTOLIC BLOOD PRESSURE: 96 MMHG | HEIGHT: 72 IN | SYSTOLIC BLOOD PRESSURE: 148 MMHG | BODY MASS INDEX: 29.12 KG/M2 | WEIGHT: 215 LBS | OXYGEN SATURATION: 99 %

## 2019-01-14 DIAGNOSIS — R10.31 CHRONIC BILATERAL LOWER ABDOMINAL PAIN: ICD-10-CM

## 2019-01-14 DIAGNOSIS — G89.29 CHRONIC BILATERAL LOWER ABDOMINAL PAIN: ICD-10-CM

## 2019-01-14 DIAGNOSIS — R10.32 CHRONIC BILATERAL LOWER ABDOMINAL PAIN: ICD-10-CM

## 2019-01-14 DIAGNOSIS — K22.70 BARRETT'S ESOPHAGUS WITHOUT DYSPLASIA: ICD-10-CM

## 2019-01-14 DIAGNOSIS — I10 ESSENTIAL HYPERTENSION: Primary | ICD-10-CM

## 2019-01-14 DIAGNOSIS — R45.86 LABILE MOOD: ICD-10-CM

## 2019-01-14 PROCEDURE — 99214 OFFICE O/P EST MOD 30 MIN: CPT | Performed by: FAMILY MEDICINE

## 2019-01-14 RX ORDER — VENLAFAXINE 50 MG/1
TABLET ORAL
Qty: 30 TABLET | Refills: 0 | Status: SHIPPED | OUTPATIENT
Start: 2019-01-14 | End: 2021-02-19

## 2019-01-14 RX ORDER — IBUPROFEN 600 MG/1
600 TABLET ORAL EVERY 6 HOURS PRN
COMMUNITY
End: 2021-03-12

## 2019-01-14 RX ORDER — AMITRIPTYLINE HYDROCHLORIDE 25 MG/1
25 TABLET, FILM COATED ORAL NIGHTLY
COMMUNITY
End: 2021-02-19 | Stop reason: SDUPTHER

## 2019-01-14 RX ORDER — HYDROXYZINE HYDROCHLORIDE 25 MG/1
25 TABLET, FILM COATED ORAL 3 TIMES DAILY PRN
COMMUNITY
End: 2021-03-12

## 2019-01-14 NOTE — PROGRESS NOTES
"Flako is a 53 y.o. year old male    Chief Complaint   Patient presents with   • Follow-up     ER f/u       History of Present Illness   HPI     HTN: since pain has been down, this is better. Norvasc 10 mg, Hygroton 25 mg, Prinivil 40 mg, Coreg 3.125 mg BID.    Abd pain: \"tolerable.\" Would like to remain off work thru end of month. Still taking Effexor 50 mg daily. Unsure of Lyrica though it was mentioned in consult note from [facility]. [facility] him 12/26/18 who recommended he try low fobmap (sp) diet. F/up 2/6/19 with him.    Anxiety: still taking hydroxyzine PRN. Elavil 25 mg QHS.    I have reviewed the patient's medical, family, and social history in detail and updated the computerized patient record.    Review of Systems   Constitutional: Negative for chills, fatigue and fever.   HENT: Negative for postnasal drip and sore throat.    Eyes: Negative for pain.   Respiratory: Negative for cough, chest tightness and wheezing.    Cardiovascular: Negative for chest pain.   Gastrointestinal: Negative.    Musculoskeletal: Negative for myalgias.   Skin: Negative for rash.   Neurological: Negative for numbness and headaches.   Psychiatric/Behavioral: Negative.    All other systems reviewed and are negative.      /96   Pulse 82   Ht 182 cm (71.65\")   Wt 97.5 kg (215 lb)   SpO2 99%   BMI 29.44 kg/m²      Physical Exam   Constitutional: He is oriented to person, place, and time. He appears well-developed and well-nourished.   HENT:   Head: Normocephalic and atraumatic.   Right Ear: External ear normal.   Left Ear: External ear normal.   Nose: Nose normal.   Mouth/Throat: Oropharynx is clear and moist.   Eyes: EOM are normal.   Neck: Normal range of motion.   Cardiovascular: Normal rate and regular rhythm.   Pulmonary/Chest: Effort normal and breath sounds normal.   Neurological: He is alert and oriented to person, place, and time.   Skin: Skin is warm and dry. No rash noted.   Psychiatric: He has a normal mood and " affect. His behavior is normal.   Nursing note and vitals reviewed.        Current Outpatient Medications:   •  amitriptyline (ELAVIL) 25 MG tablet, Take 25 mg by mouth Every Night., Disp: , Rfl:   •  amLODIPine (NORVASC) 10 MG tablet, Take 1 tablet by mouth Daily., Disp: 90 tablet, Rfl: 1  •  dicyclomine (BENTYL) 20 MG tablet, Take 1 tablet by mouth Every 6 (Six) Hours As Needed (abdominal pain)., Disp: 24 tablet, Rfl: 0  •  hydrOXYzine (ATARAX) 25 MG tablet, Take 25 mg by mouth 3 (Three) Times a Day As Needed for Itching., Disp: , Rfl:   •  ibuprofen (ADVIL,MOTRIN) 600 MG tablet, Take 600 mg by mouth Every 6 (Six) Hours As Needed., Disp: , Rfl:   •  lisinopril (PRINIVIL,ZESTRIL) 20 MG tablet, Take 1 tablet by mouth 2 (Two) Times a Day., Disp: 180 tablet, Rfl: 1  •  Multiple Vitamins-Minerals (MULTIVITAMIN ADULT PO), Take 1 capsule by mouth., Disp: , Rfl:   •  ondansetron (ZOFRAN) 4 MG tablet, Take 4 mg by mouth Every 8 (Eight) Hours As Needed for Nausea or Vomiting., Disp: , Rfl:   •  pantoprazole (PROTONIX) 40 MG EC tablet, Take 40 mg by mouth Daily., Disp: , Rfl:   •  raNITIdine (ZANTAC) 150 MG tablet, Take 150 mg by mouth 2 (Two) Times a Day., Disp: , Rfl:   •  venlafaxine (EFFEXOR) 50 MG tablet, TAKE 1 TABLET BY MOUTH EVERY DAY, Disp: 30 tablet, Rfl: 0  •  carvedilol (COREG) 3.125 MG tablet, Take 3.125 mg by mouth 2 (Two) Times a Day With Meals., Disp: , Rfl:   •  chlorthalidone (HYGROTON) 25 MG tablet, Take 1 tablet by mouth Daily., Disp: 90 tablet, Rfl: 1  •  promethazine (PHENERGAN) 12.5 MG tablet, Take 1-2 tabs every 6 hrs PRN nausea or vomiting, Disp: 20 tablet, Rfl: 0  •  sucralfate (CARAFATE) 1 g tablet, Take 1 g by mouth 4 (Four) Times a Day., Disp: , Rfl:      Diagnoses and all orders for this visit:    Essential hypertension    Lepe's esophagus without dysplasia    Chronic bilateral lower abdominal pain    Labile mood    Other orders  -     amitriptyline (ELAVIL) 25 MG tablet; Take 25 mg by mouth  Every Night.  -     hydrOXYzine (ATARAX) 25 MG tablet; Take 25 mg by mouth 3 (Three) Times a Day As Needed for Itching.  -     ibuprofen (ADVIL,MOTRIN) 600 MG tablet; Take 600 mg by mouth Every 6 (Six) Hours As Needed.       Overall, Flako seems to be doing well given his recent medical history.  He has not been hospitalized or gone to the ER for greater than 3 weeks.  His abdominal pain has been much better over the past 10 days since starting his diet.  On chart review, no pending labs follow-up.  Reviewed his MRI brain once again and this was unremarkable for cause for his mood change.  We went through his medication list one by one and updated to reflect his most up-to-date medications.  He will contact his employer regarding necessity for disability paperwork and if needed, I will fill it out prior to his next office visit.  Otherwise, I will see him back in a few weeks and we will discuss full return to work in any paperwork that may be needed.      EMR Dragon/Transcription disclaimer:    Much of this encounter note is an electronic transcription/translation of spoken language to printed text.  The electronic translation of spoken language may permit erroneous, or at times, nonsensical words or phrases to be inadvertently transcribed.  Although I have reviewed the note for such errors some may still exist.

## 2019-01-21 ENCOUNTER — OFFICE VISIT (OUTPATIENT)
Dept: SPORTS MEDICINE | Facility: CLINIC | Age: 54
End: 2019-01-21

## 2019-01-21 VITALS
HEIGHT: 72 IN | SYSTOLIC BLOOD PRESSURE: 148 MMHG | BODY MASS INDEX: 29.12 KG/M2 | OXYGEN SATURATION: 99 % | HEART RATE: 76 BPM | DIASTOLIC BLOOD PRESSURE: 102 MMHG | WEIGHT: 215 LBS | TEMPERATURE: 97.9 F

## 2019-01-21 DIAGNOSIS — I10 ESSENTIAL HYPERTENSION: ICD-10-CM

## 2019-01-21 DIAGNOSIS — G89.29 CHRONIC BILATERAL LOWER ABDOMINAL PAIN: ICD-10-CM

## 2019-01-21 DIAGNOSIS — R10.31 CHRONIC BILATERAL LOWER ABDOMINAL PAIN: ICD-10-CM

## 2019-01-21 DIAGNOSIS — R10.32 CHRONIC BILATERAL LOWER ABDOMINAL PAIN: ICD-10-CM

## 2019-01-21 DIAGNOSIS — K22.70 BARRETT'S ESOPHAGUS WITHOUT DYSPLASIA: Primary | ICD-10-CM

## 2019-01-21 PROCEDURE — 99214 OFFICE O/P EST MOD 30 MIN: CPT | Performed by: FAMILY MEDICINE

## 2019-01-21 NOTE — PROGRESS NOTES
"Flako is a 53 y.o. year old male    Chief Complaint   Patient presents with   • Follow-up     Complete [facility] Paperwork       History of Present Illness   HPI     Here for [employer] paperwork.  Had disability paperwork faxed over from his [employer] and needs this completed.  Does request modifications to full work day in terms of standing, walking, lifting weights, pushing, pulling.    Lepe's esophagitis, GERD.  Has pending appointment with GI specialists at [facility] on 2/6/19.  No change in symptoms.    HTN: Taking medications as written.  No acute complaints.    I have reviewed the patient's medical, family, and social history in detail and updated the computerized patient record.    Review of Systems   Constitutional: Negative for chills, fatigue and fever.   HENT: Negative for postnasal drip and sore throat.    Eyes: Negative for pain.   Respiratory: Negative for cough, chest tightness and wheezing.    Cardiovascular: Negative for chest pain.   Gastrointestinal: Negative.    Musculoskeletal: Negative for myalgias.   Skin: Negative for rash.   Neurological: Negative for numbness and headaches.   Psychiatric/Behavioral: Negative.    All other systems reviewed and are negative.      BP (!) 148/102   Pulse 76   Temp 97.9 °F (36.6 °C)   Ht 181.6 cm (71.5\")   Wt 97.5 kg (215 lb)   SpO2 99%   BMI 29.57 kg/m²      Physical Exam   Constitutional: He is oriented to person, place, and time. He appears well-developed and well-nourished.   HENT:   Head: Normocephalic and atraumatic.   Right Ear: External ear normal.   Left Ear: External ear normal.   Nose: Nose normal.   Mouth/Throat: Oropharynx is clear and moist.   Eyes: EOM are normal.   Neck: Normal range of motion.   Cardiovascular: Normal rate and regular rhythm.   Pulmonary/Chest: Effort normal and breath sounds normal.   Neurological: He is alert and oriented to person, place, and time.   Skin: Skin is warm and dry. No rash noted.   Psychiatric: He has a normal mood and " affect. His behavior is normal.   Nursing note and vitals reviewed.        Current Outpatient Medications:   •  amitriptyline (ELAVIL) 25 MG tablet, Take 25 mg by mouth Every Night., Disp: , Rfl:   •  amLODIPine (NORVASC) 10 MG tablet, Take 1 tablet by mouth Daily., Disp: 90 tablet, Rfl: 1  •  carvedilol (COREG) 3.125 MG tablet, Take 3.125 mg by mouth 2 (Two) Times a Day With Meals., Disp: , Rfl:   •  chlorthalidone (HYGROTON) 25 MG tablet, Take 1 tablet by mouth Daily., Disp: 90 tablet, Rfl: 1  •  dicyclomine (BENTYL) 20 MG tablet, Take 1 tablet by mouth Every 6 (Six) Hours As Needed (abdominal pain)., Disp: 24 tablet, Rfl: 0  •  hydrOXYzine (ATARAX) 25 MG tablet, Take 25 mg by mouth 3 (Three) Times a Day As Needed for Itching., Disp: , Rfl:   •  ibuprofen (ADVIL,MOTRIN) 600 MG tablet, Take 600 mg by mouth Every 6 (Six) Hours As Needed., Disp: , Rfl:   •  lisinopril (PRINIVIL,ZESTRIL) 20 MG tablet, Take 1 tablet by mouth 2 (Two) Times a Day., Disp: 180 tablet, Rfl: 1  •  Multiple Vitamins-Minerals (MULTIVITAMIN ADULT PO), Take 1 capsule by mouth., Disp: , Rfl:   •  ondansetron (ZOFRAN) 4 MG tablet, Take 4 mg by mouth Every 8 (Eight) Hours As Needed for Nausea or Vomiting., Disp: , Rfl:   •  pantoprazole (PROTONIX) 40 MG EC tablet, Take 40 mg by mouth Daily., Disp: , Rfl:   •  promethazine (PHENERGAN) 12.5 MG tablet, Take 1-2 tabs every 6 hrs PRN nausea or vomiting, Disp: 20 tablet, Rfl: 0  •  raNITIdine (ZANTAC) 150 MG tablet, Take 150 mg by mouth 2 (Two) Times a Day., Disp: , Rfl:   •  sucralfate (CARAFATE) 1 g tablet, Take 1 g by mouth 4 (Four) Times a Day., Disp: , Rfl:   •  venlafaxine (EFFEXOR) 50 MG tablet, TAKE 1 TABLET BY MOUTH EVERY DAY, Disp: 30 tablet, Rfl: 0     Diagnoses and all orders for this visit:    Lepe's esophagus without dysplasia    Chronic bilateral lower abdominal pain    Essential hypertension       Paperwork was completed as requested and faxed today.  He has follow-up with me on 2/7/19  with anticipation of return to work on 2/11/19.    Chronic medical conditions which are stable.  No recent ER visits or hospitalizations.  Keep appointment with GI specialist.    EMR Dragon/Transcription disclaimer:    Much of this encounter note is an electronic transcription/translation of spoken language to printed text.  The electronic translation of spoken language may permit erroneous, or at times, nonsensical words or phrases to be inadvertently transcribed.  Although I have reviewed the note for such errors some may still exist.

## 2019-02-11 ENCOUNTER — OFFICE VISIT (OUTPATIENT)
Dept: SPORTS MEDICINE | Facility: CLINIC | Age: 54
End: 2019-02-11

## 2019-02-11 VITALS
DIASTOLIC BLOOD PRESSURE: 114 MMHG | WEIGHT: 226.6 LBS | HEART RATE: 81 BPM | SYSTOLIC BLOOD PRESSURE: 162 MMHG | OXYGEN SATURATION: 100 % | HEIGHT: 72 IN | BODY MASS INDEX: 30.69 KG/M2

## 2019-02-11 DIAGNOSIS — I10 ACCELERATED HYPERTENSION: ICD-10-CM

## 2019-02-11 DIAGNOSIS — R10.31 CHRONIC BILATERAL LOWER ABDOMINAL PAIN: ICD-10-CM

## 2019-02-11 DIAGNOSIS — G89.29 CHRONIC BILATERAL LOWER ABDOMINAL PAIN: ICD-10-CM

## 2019-02-11 DIAGNOSIS — R10.32 CHRONIC BILATERAL LOWER ABDOMINAL PAIN: ICD-10-CM

## 2019-02-11 DIAGNOSIS — K22.70 BARRETT'S ESOPHAGUS WITHOUT DYSPLASIA: ICD-10-CM

## 2019-02-11 DIAGNOSIS — F41.8 MIXED ANXIETY DEPRESSIVE DISORDER: ICD-10-CM

## 2019-02-11 DIAGNOSIS — I10 ESSENTIAL HYPERTENSION: Primary | ICD-10-CM

## 2019-02-11 PROCEDURE — 99214 OFFICE O/P EST MOD 30 MIN: CPT | Performed by: FAMILY MEDICINE

## 2019-02-11 NOTE — PROGRESS NOTES
"Flako is a 53 y.o. year old male    Chief Complaint   Patient presents with   • Follow-up     stomach issues       History of Present Illness   HPI     Hypertension: He threw up his blood pressure medications morning as he has had slight setback in his Lepe's esophagus over the past few days.  No red flag symptoms.    Lepe's esophagus with lower abdominal pain: As stated, has had setback over the past 3 days but he attributes that to a lapse in his diet.  He states that the 5 mouth diet has been helping with improving some of the symptoms.  Went to gastroenterologist last week who has recommended repeat CT scan, bowel follow-through study, both of which will be performed later this week.  Desires to return to work next week with restrictions which we placed last visit with paperwork.    Depression: No suicidal ideation.  Admits that \"I was selfish\" when he was having those thoughts back in the fall.    I have reviewed the patient's medical, family, and social history in detail and updated the computerized patient record.    Review of Systems   Constitutional: Negative for chills, fatigue and fever.   HENT: Negative for congestion, rhinorrhea and sinus pressure.    Respiratory: Negative for chest tightness and shortness of breath.    Cardiovascular: Negative for chest pain.   Gastrointestinal: Positive for abdominal pain, nausea and vomiting.   Musculoskeletal: Negative for arthralgias.   Skin: Negative for rash.   Neurological: Negative for numbness and headaches.   All other systems reviewed and are negative.      BP (!) 162/114 (BP Location: Left arm, Patient Position: Sitting)   Pulse 81   Ht 181.6 cm (71.5\")   Wt 103 kg (226 lb 9.6 oz)   SpO2 100%   BMI 31.16 kg/m²      Physical Exam   Constitutional: He is oriented to person, place, and time. He appears well-developed and well-nourished.   HENT:   Head: Normocephalic and atraumatic.   Right Ear: External ear normal.   Left Ear: External ear normal. "   Nose: Nose normal.   Mouth/Throat: Oropharynx is clear and moist.   Eyes: EOM are normal.   Neck: Normal range of motion.   Cardiovascular: Normal rate and regular rhythm.   Pulmonary/Chest: Effort normal and breath sounds normal.   Neurological: He is alert and oriented to person, place, and time.   Skin: Skin is warm and dry. No rash noted.   Psychiatric: He has a normal mood and affect. His behavior is normal.   Nursing note and vitals reviewed.        Current Outpatient Medications:   •  amitriptyline (ELAVIL) 25 MG tablet, Take 25 mg by mouth Every Night., Disp: , Rfl:   •  amLODIPine (NORVASC) 10 MG tablet, Take 1 tablet by mouth Daily., Disp: 90 tablet, Rfl: 1  •  carvedilol (COREG) 3.125 MG tablet, Take 3.125 mg by mouth 2 (Two) Times a Day With Meals., Disp: , Rfl:   •  chlorthalidone (HYGROTON) 25 MG tablet, Take 1 tablet by mouth Daily., Disp: 90 tablet, Rfl: 1  •  dicyclomine (BENTYL) 20 MG tablet, Take 1 tablet by mouth Every 6 (Six) Hours As Needed (abdominal pain)., Disp: 24 tablet, Rfl: 0  •  hydrOXYzine (ATARAX) 25 MG tablet, Take 25 mg by mouth 3 (Three) Times a Day As Needed for Itching., Disp: , Rfl:   •  ibuprofen (ADVIL,MOTRIN) 600 MG tablet, Take 600 mg by mouth Every 6 (Six) Hours As Needed., Disp: , Rfl:   •  lisinopril (PRINIVIL,ZESTRIL) 20 MG tablet, Take 1 tablet by mouth 2 (Two) Times a Day., Disp: 180 tablet, Rfl: 1  •  Multiple Vitamins-Minerals (MULTIVITAMIN ADULT PO), Take 1 capsule by mouth., Disp: , Rfl:   •  ondansetron (ZOFRAN) 4 MG tablet, Take 4 mg by mouth Every 8 (Eight) Hours As Needed for Nausea or Vomiting., Disp: , Rfl:   •  pantoprazole (PROTONIX) 40 MG EC tablet, Take 40 mg by mouth Daily., Disp: , Rfl:   •  promethazine (PHENERGAN) 12.5 MG tablet, Take 1-2 tabs every 6 hrs PRN nausea or vomiting, Disp: 20 tablet, Rfl: 0  •  raNITIdine (ZANTAC) 150 MG tablet, Take 150 mg by mouth 2 (Two) Times a Day., Disp: , Rfl:   •  sucralfate (CARAFATE) 1 g tablet, Take 1 g by  mouth 4 (Four) Times a Day., Disp: , Rfl:   •  venlafaxine (EFFEXOR) 50 MG tablet, TAKE 1 TABLET BY MOUTH EVERY DAY, Disp: 30 tablet, Rfl: 0     Diagnoses and all orders for this visit:    Essential hypertension    Accelerated hypertension    Lepe's esophagus without dysplasia    Chronic bilateral lower abdominal pain    Mixed anxiety depressive disorder       Despite his blood pressure being high today, this is likely attributable to the fact that he has been unable to take his oral medications and keep them down.  If this does continue, I recommend to check his blood pressure at home tomorrow and then when he is at the hospital for a CT scan, he may need to present to the emergency department for assistance with IV medications to help lower his BP. Flako voiced understanding.    EMR Dragon/Transcription disclaimer:    Much of this encounter note is an electronic transcription/translation of spoken language to printed text.  The electronic translation of spoken language may permit erroneous, or at times, nonsensical words or phrases to be inadvertently transcribed.  Although I have reviewed the note for such errors some may still exist.

## 2019-02-12 ENCOUNTER — TELEPHONE (OUTPATIENT)
Dept: SPORTS MEDICINE | Facility: CLINIC | Age: 54
End: 2019-02-12

## 2019-02-12 NOTE — TELEPHONE ENCOUNTER
Pt called to report BP readings during his CT this morning. 190/138, 180s/130s. Being sent to ER for IV BP meds.

## 2019-03-02 DIAGNOSIS — I10 ESSENTIAL HYPERTENSION: ICD-10-CM

## 2019-03-04 RX ORDER — AMLODIPINE BESYLATE 10 MG/1
10 TABLET ORAL DAILY
Qty: 90 TABLET | Refills: 1 | Status: SHIPPED | OUTPATIENT
Start: 2019-03-04 | End: 2021-02-19 | Stop reason: SDUPTHER

## 2019-04-23 ENCOUNTER — TELEPHONE (OUTPATIENT)
Dept: SPORTS MEDICINE | Facility: CLINIC | Age: 54
End: 2019-04-23

## 2019-04-23 RX ORDER — PREDNISONE 50 MG/1
50 TABLET ORAL DAILY
Qty: 7 TABLET | Refills: 0 | Status: SHIPPED | OUTPATIENT
Start: 2019-04-23 | End: 2019-04-30

## 2019-04-24 DIAGNOSIS — I10 ESSENTIAL HYPERTENSION: ICD-10-CM

## 2019-04-24 RX ORDER — LISINOPRIL 20 MG/1
TABLET ORAL
Qty: 180 TABLET | Refills: 1 | Status: SHIPPED | OUTPATIENT
Start: 2019-04-24 | End: 2021-02-19 | Stop reason: SDUPTHER

## 2019-05-01 ENCOUNTER — OFFICE VISIT (OUTPATIENT)
Dept: SPORTS MEDICINE | Facility: CLINIC | Age: 54
End: 2019-05-01

## 2019-05-01 VITALS
BODY MASS INDEX: 30.61 KG/M2 | SYSTOLIC BLOOD PRESSURE: 134 MMHG | WEIGHT: 226 LBS | HEIGHT: 72 IN | DIASTOLIC BLOOD PRESSURE: 92 MMHG

## 2019-05-01 DIAGNOSIS — F41.8 MIXED ANXIETY DEPRESSIVE DISORDER: ICD-10-CM

## 2019-05-01 DIAGNOSIS — H18.892 CORNEAL IRRITATION OF LEFT EYE: Primary | ICD-10-CM

## 2019-05-01 DIAGNOSIS — H02.846 SWELLING OF LEFT EYELID: ICD-10-CM

## 2019-05-01 PROCEDURE — 99214 OFFICE O/P EST MOD 30 MIN: CPT | Performed by: FAMILY MEDICINE

## 2019-05-01 NOTE — PROGRESS NOTES
"Flako is a 53 y.o. year old male evaluation of a problem that is new to this examiner.    Chief Complaint   Patient presents with   • Eye Pain     follow up, LT       History of Present Illness   HPI     Left eye pain that has been there for greater than 1 month.  [place] the mountains, he unfortunately suffered a direct trauma to the eye with a tree branch.  Has had swelling and discomfort in the eye since.  Went to urgent care last night where he was evaluated for corneal abrasion and was told that there are \"dots\" where the dye penetrated.  Has had irritation in the eye since.  He was given a prescription for artificial tears and Zaditor.  No loss of vision.    MAD: \"I am doing well\".  He has had a good spring.  He was able to go [place] in the [facility] until he unfortunately suffered an ankle sprain.  Overall, coping.    I have reviewed the patient's medical, family, and social history in detail and updated the computerized patient record.    Review of Systems   Constitutional: Negative for chills, fatigue and fever.   HENT: Negative for congestion, rhinorrhea and sinus pressure.    Eyes: Positive for pain and discharge. Negative for photophobia and visual disturbance.   Respiratory: Negative for chest tightness and shortness of breath.    Cardiovascular: Negative for chest pain.   Gastrointestinal: Negative for abdominal pain.   Musculoskeletal: Negative for arthralgias.   Skin: Negative for rash.   Neurological: Negative for numbness and headaches.   All other systems reviewed and are negative.      /92   Ht 181.6 cm (71.5\")   Wt 103 kg (226 lb)   BMI 31.08 kg/m²      Physical Exam   Constitutional: He is oriented to person, place, and time. Vital signs are normal. He appears well-developed and well-nourished.   HENT:   Head: Normocephalic and atraumatic.   Eyes: Conjunctivae and EOM are normal. Pupils are equal, round, and reactive to light. Left " conjunctiva is not injected. Left eye exhibits no nystagmus.       Pulmonary/Chest: No accessory muscle usage. No respiratory distress.   Musculoskeletal: He exhibits no deformity.   Neurological: He is alert and oriented to person, place, and time.   Skin: Skin is warm and dry. No rash noted.   Psychiatric: He has a normal mood and affect. His behavior is normal.   Nursing note and vitals reviewed.        Current Outpatient Medications:   •  amitriptyline (ELAVIL) 25 MG tablet, Take 25 mg by mouth Every Night., Disp: , Rfl:   •  amLODIPine (NORVASC) 10 MG tablet, TAKE 1 TABLET BY MOUTH DAILY., Disp: 90 tablet, Rfl: 1  •  carvedilol (COREG) 3.125 MG tablet, Take 3.125 mg by mouth 2 (Two) Times a Day With Meals., Disp: , Rfl:   •  chlorthalidone (HYGROTON) 25 MG tablet, Take 1 tablet by mouth Daily., Disp: 90 tablet, Rfl: 1  •  dicyclomine (BENTYL) 20 MG tablet, Take 1 tablet by mouth Every 6 (Six) Hours As Needed (abdominal pain)., Disp: 24 tablet, Rfl: 0  •  hydrOXYzine (ATARAX) 25 MG tablet, Take 25 mg by mouth 3 (Three) Times a Day As Needed for Itching., Disp: , Rfl:   •  ibuprofen (ADVIL,MOTRIN) 600 MG tablet, Take 600 mg by mouth Every 6 (Six) Hours As Needed., Disp: , Rfl:   •  lisinopril (PRINIVIL,ZESTRIL) 20 MG tablet, TAKE 1 TABLET BY MOUTH TWICE A DAY, Disp: 180 tablet, Rfl: 1  •  Multiple Vitamins-Minerals (MULTIVITAMIN ADULT PO), Take 1 capsule by mouth., Disp: , Rfl:   •  ondansetron (ZOFRAN) 4 MG tablet, Take 4 mg by mouth Every 8 (Eight) Hours As Needed for Nausea or Vomiting., Disp: , Rfl:   •  pantoprazole (PROTONIX) 40 MG EC tablet, Take 40 mg by mouth Daily., Disp: , Rfl:   •  promethazine (PHENERGAN) 12.5 MG tablet, Take 1-2 tabs every 6 hrs PRN nausea or vomiting, Disp: 20 tablet, Rfl: 0  •  raNITIdine (ZANTAC) 150 MG tablet, Take 150 mg by mouth 2 (Two) Times a Day., Disp: , Rfl:   •  sucralfate (CARAFATE) 1 g tablet, Take 1 g by mouth 4 (Four) Times a Day., Disp: , Rfl:   •  venlafaxine  (EFFEXOR) 50 MG tablet, TAKE 1 TABLET BY MOUTH EVERY DAY, Disp: 30 tablet, Rfl: 0     Diagnoses and all orders for this visit:    Corneal irritation of left eye  -     Ambulatory Referral to Ophthalmology    Swelling of left eyelid  -     Ambulatory Referral to Ophthalmology    Mixed anxiety depressive disorder       1, 2.  Reviewed urgent care note.  From their report, no evidence of corneal abrasion.  I am recommending ophthalmologic consultation and referral made.  Can continue drops were written.  3.  Stable.  Continue to monitor.      EMR Dragon/Transcription disclaimer:    Much of this encounter note is an electronic transcription/translation of spoken language to printed text.  The electronic translation of spoken language may permit erroneous, or at times, nonsensical words or phrases to be inadvertently transcribed.  Although I have reviewed the note for such errors some may still exist.

## 2019-06-30 DIAGNOSIS — I10 ESSENTIAL HYPERTENSION: ICD-10-CM

## 2019-07-01 RX ORDER — LISINOPRIL 20 MG/1
20 TABLET ORAL DAILY
Qty: 90 TABLET | Refills: 3 | OUTPATIENT
Start: 2019-07-01

## 2020-09-14 ENCOUNTER — TELEPHONE (OUTPATIENT)
Dept: SPORTS MEDICINE | Facility: CLINIC | Age: 55
End: 2020-09-14

## 2020-09-14 NOTE — TELEPHONE ENCOUNTER
Patient called in on 09/11 leaving a voicemail. He is asking for a prescription for a UTI. He does not want to come in for a visit, reports only having sxs of an odor when urinating.     Thanks,  kiersten

## 2020-09-14 NOTE — TELEPHONE ENCOUNTER
This is not something that I typically see in males and for that reason, I would like to see him in office to formally evaluate prior to sending in any type of medication.

## 2020-09-14 NOTE — TELEPHONE ENCOUNTER
"I called and informed patient, he states he will not come in for an office visit unless he is having a heart attack. He does not have insurance and does not want to pay for a \"visit.\" he states he knows he has a UTI because he's had them before. I expressed to the patient that unfortunately without evaluating him and possibly doing a urine sample, we cannot just send in a prescription for this complaint.   He asked for a return call from Dr. Rodriguez if able.   All other information was verbally understood.  -Priti   "

## 2021-02-19 ENCOUNTER — OFFICE VISIT (OUTPATIENT)
Dept: SPORTS MEDICINE | Facility: CLINIC | Age: 56
End: 2021-02-19

## 2021-02-19 VITALS
WEIGHT: 225 LBS | SYSTOLIC BLOOD PRESSURE: 210 MMHG | TEMPERATURE: 98.6 F | OXYGEN SATURATION: 98 % | HEART RATE: 72 BPM | DIASTOLIC BLOOD PRESSURE: 120 MMHG | BODY MASS INDEX: 30.48 KG/M2 | RESPIRATION RATE: 16 BRPM | HEIGHT: 72 IN

## 2021-02-19 DIAGNOSIS — F41.8 MIXED ANXIETY DEPRESSIVE DISORDER: ICD-10-CM

## 2021-02-19 DIAGNOSIS — Z12.5 SCREENING FOR PROSTATE CANCER: ICD-10-CM

## 2021-02-19 DIAGNOSIS — R39.198 DIFFICULTY URINATING: ICD-10-CM

## 2021-02-19 DIAGNOSIS — Z11.3 SCREEN FOR STD (SEXUALLY TRANSMITTED DISEASE): ICD-10-CM

## 2021-02-19 DIAGNOSIS — Z80.42 FAMILY HISTORY OF PROSTATE CANCER: ICD-10-CM

## 2021-02-19 DIAGNOSIS — R39.89 POSSIBLE URINARY TRACT INFECTION: Primary | ICD-10-CM

## 2021-02-19 DIAGNOSIS — I10 ESSENTIAL HYPERTENSION: ICD-10-CM

## 2021-02-19 LAB
BILIRUB BLD-MCNC: ABNORMAL MG/DL
CLARITY, POC: CLEAR
COLOR UR: ABNORMAL
GLUCOSE UR STRIP-MCNC: NEGATIVE MG/DL
KETONES UR QL: ABNORMAL
LEUKOCYTE EST, POC: NEGATIVE
NITRITE UR-MCNC: NEGATIVE MG/ML
PH UR: 7 [PH] (ref 5–8)
PROT UR STRIP-MCNC: NEGATIVE MG/DL
RBC # UR STRIP: NEGATIVE /UL
SP GR UR: 1.03 (ref 1–1.03)
UROBILINOGEN UR QL: NORMAL

## 2021-02-19 PROCEDURE — 99214 OFFICE O/P EST MOD 30 MIN: CPT | Performed by: FAMILY MEDICINE

## 2021-02-19 RX ORDER — LISINOPRIL 20 MG/1
20 TABLET ORAL 2 TIMES DAILY
Qty: 180 TABLET | Refills: 0 | Status: SHIPPED | OUTPATIENT
Start: 2021-02-19 | End: 2021-07-01 | Stop reason: SDUPTHER

## 2021-02-19 RX ORDER — AMLODIPINE BESYLATE 10 MG/1
10 TABLET ORAL DAILY
Qty: 90 TABLET | Refills: 0 | Status: SHIPPED | OUTPATIENT
Start: 2021-02-19 | End: 2021-07-01 | Stop reason: SDUPTHER

## 2021-02-19 RX ORDER — TAMSULOSIN HYDROCHLORIDE 0.4 MG/1
1 CAPSULE ORAL DAILY
Qty: 30 CAPSULE | Refills: 0 | Status: SHIPPED | OUTPATIENT
Start: 2021-02-19 | End: 2022-02-07

## 2021-02-19 RX ORDER — CARVEDILOL 3.12 MG/1
3.12 TABLET ORAL 2 TIMES DAILY WITH MEALS
Qty: 180 TABLET | Refills: 0 | Status: SHIPPED | OUTPATIENT
Start: 2021-02-19 | End: 2022-02-07

## 2021-02-19 RX ORDER — CHLORTHALIDONE 25 MG/1
25 TABLET ORAL DAILY
Qty: 90 TABLET | Refills: 0 | Status: SHIPPED | OUTPATIENT
Start: 2021-02-19 | End: 2022-02-07

## 2021-02-19 RX ORDER — AMITRIPTYLINE HYDROCHLORIDE 25 MG/1
25 TABLET, FILM COATED ORAL NIGHTLY
Qty: 90 TABLET | Refills: 0 | Status: SHIPPED | OUTPATIENT
Start: 2021-02-19 | End: 2022-05-11 | Stop reason: SDUPTHER

## 2021-02-19 NOTE — PROGRESS NOTES
"Chief Complaint  Urinary Tract Infection (Pain with urination for three months, states he is unable to have intercourse. )    Subjective          Flako Coreas presents to [facility]  History of Present Illness  1.  Difficulty urinating times months.  He also endorses difficulty ejaculating and maintaining erection.  States that he has not been sexually active for over 2 years.  He was with one partner at that time and does not endorse any concern for sexually transmitted disease.  He is concerned regarding prostate cancer as his father was diagnosed in his late 50s.  2.  HTN: Insurance has lapsed and for this reason, has not been on medications.  No acute symptoms reported.  3.  Depression: Worsening.  Cannot recall efficacy to Effexor.  Elavil did help him to go to sleep.  Has tried counseling.  Would like to see psychiatrist.  No suicidal thoughts.     Objective   Vital Signs:   BP (!) 210/120 (BP Location: Right arm, Patient Position: Sitting, Cuff Size: Adult)   Pulse 72   Temp 98.6 °F (37 °C)   Resp 16   Ht 181.6 cm (71.5\")   Wt 102 kg (225 lb)   SpO2 98%   BMI 30.94 kg/m²     Physical Exam  General: No acute distress.  Psych: Agitated, pressured speech.    Result Review :              Brief Urine Lab Results  (Last result in the past 365 days)      Color   Clarity   Blood   Leuk Est   Nitrite   Protein   CREAT   Urine HCG        02/19/21 1153 Mari Clear Negative Negative Negative Negative                  Assessment and Plan    Diagnoses and all orders for this visit:    1. Possible urinary tract infection (Primary)  -     POC Urinalysis Dipstick, Automated    2. Difficulty urinating  -     tamsulosin (Flomax) 0.4 MG capsule 24 hr capsule; Take 1 capsule by mouth Daily.  Dispense: 30 capsule; Refill: 0    3. Family history of prostate cancer    4. Mixed anxiety depressive disorder  -     amitriptyline (ELAVIL) 25 MG tablet; Take 1 tablet by mouth Every Night.  " Dispense: 90 tablet; Refill: 0  -     Ambulatory Referral to Psychiatry    5. Essential hypertension  -     lisinopril (PRINIVIL,ZESTRIL) 20 MG tablet; Take 1 tablet by mouth 2 (Two) Times a Day.  Dispense: 180 tablet; Refill: 0  -     chlorthalidone (HYGROTON) 25 MG tablet; Take 1 tablet by mouth Daily.  Dispense: 90 tablet; Refill: 0  -     carvedilol (COREG) 3.125 MG tablet; Take 1 tablet by mouth 2 (Two) Times a Day With Meals.  Dispense: 180 tablet; Refill: 0  -     amLODIPine (NORVASC) 10 MG tablet; Take 1 tablet by mouth Daily.  Dispense: 90 tablet; Refill: 0    6. Screen for STD (sexually transmitted disease)  -     Chlamydia trachomatis, Neisseria gonorrhoeae, PCR - Urine, Urine, Clean Catch  -     Hepatitis Panel, Acute  -     HIV-1 / O / 2 Ag / Antibody 4th Generation  -     HSV 1/2, PCR - Blood, Arm, Left  -     RPR    7. Screening for prostate cancer  -     PSA Screen    Labs as ordered.  Medication refill sent.  Referral to psychiatry.  Believe some of his urinary symptoms to be more psychosomatic but he could also have variant of enlarged prostate/prostatitis.  I will send in Flomax and consider 2-week trial of doxycycline based on lab results.    Follow Up   Return in about 4 weeks (around 3/19/2021) for CPE.  Patient was given instructions and counseling regarding his condition or for health maintenance advice. Please see specific information pulled into the AVS if appropriate.

## 2021-02-22 ENCOUNTER — TELEPHONE (OUTPATIENT)
Dept: SPORTS MEDICINE | Facility: CLINIC | Age: 56
End: 2021-02-22

## 2021-02-22 NOTE — TELEPHONE ENCOUNTER
Patient called and wanted to know if you had sent in new depression medication because shanel has not received a new script. I informed patient that the only thing I see on his chart is the amitriptyline. He states that he had discussed getting new medication with you in office. Please advise, thank you!

## 2021-02-23 NOTE — TELEPHONE ENCOUNTER
Spoke with patient and clarified the misunderstanding about a new medication. Relayed the message below and let him know that psychiatry will reach out to him. Patient verified and repeated the message. He verbalized understanding and no further questions or actions a due at this time

## 2021-02-25 DIAGNOSIS — R39.198 DIFFICULTY URINATING: Primary | ICD-10-CM

## 2021-03-01 LAB
C TRACH RRNA SPEC QL NAA+PROBE: NEGATIVE
HAV IGM SERPL QL IA: NEGATIVE
HBV CORE IGM SERPL QL IA: NEGATIVE
HBV SURFACE AG SERPL QL IA: NEGATIVE
HCV AB S/CO SERPL IA: <0.1 S/CO RATIO (ref 0–0.9)
HIV 1+2 AB+HIV1 P24 AG SERPL QL IA: NON REACTIVE
HSV1 DNA SPEC QL NAA+PROBE: NEGATIVE
HSV2 DNA SPEC QL NAA+PROBE: NEGATIVE
N GONORRHOEA RRNA SPEC QL NAA+PROBE: NEGATIVE
PSA SERPL-MCNC: 0.4 NG/ML (ref 0–4)
RPR SER QL: NON REACTIVE
SPECIMEN STATUS: NORMAL

## 2021-03-12 ENCOUNTER — OFFICE VISIT (OUTPATIENT)
Dept: SPORTS MEDICINE | Facility: CLINIC | Age: 56
End: 2021-03-12

## 2021-03-12 VITALS
OXYGEN SATURATION: 97 % | SYSTOLIC BLOOD PRESSURE: 147 MMHG | DIASTOLIC BLOOD PRESSURE: 102 MMHG | HEART RATE: 90 BPM | HEIGHT: 72 IN | TEMPERATURE: 98.7 F | WEIGHT: 225.2 LBS | RESPIRATION RATE: 16 BRPM | BODY MASS INDEX: 30.5 KG/M2

## 2021-03-12 DIAGNOSIS — Z00.00 ANNUAL PHYSICAL EXAM: Primary | ICD-10-CM

## 2021-03-12 DIAGNOSIS — F41.8 MIXED ANXIETY DEPRESSIVE DISORDER: ICD-10-CM

## 2021-03-12 DIAGNOSIS — I10 ESSENTIAL HYPERTENSION: ICD-10-CM

## 2021-03-12 PROCEDURE — 99396 PREV VISIT EST AGE 40-64: CPT | Performed by: FAMILY MEDICINE

## 2021-03-12 RX ORDER — LIDOCAINE 50 MG/G
1 PATCH TOPICAL DAILY
COMMUNITY
Start: 2021-03-10 | End: 2022-02-07

## 2021-03-12 RX ORDER — OMEPRAZOLE 40 MG/1
40 CAPSULE, DELAYED RELEASE ORAL DAILY
COMMUNITY
Start: 2021-03-10 | End: 2022-02-07

## 2021-03-12 NOTE — PROGRESS NOTES
Flako LINN Coreas is here today for an annual physical exam.     Eating a healthy diet. Exercising routinely.     HTN: has not been taking carvedilol and/or chlorthalidone as he was confused and thought those were his as needed blood pressure medications.  Otherwise, has been taking amlodipine, lisinopril.    Insomnia: Better with Elavil.  Has been sleeping 7 to 8 hours uninterrupted.    BPH symptoms are improved with Flomax.  Since last OV, has been to the ER for worsening scrotal pain, diagnosed with hydrocele.  Has appointment with urology next week.    I have reviewed the patient's medical, family, and social history in detail and updated the computerized patient record.    Health Maintenance   Topic Date Due   • TDAP/TD VACCINES (1 - Tdap) Never done   • ZOSTER VACCINE (1 of 2) Never done   • LIPID PANEL  07/27/2019   • INFLUENZA VACCINE  08/01/2020   • COVID-19 Vaccine (2 of 2 - Pfizer series) 03/30/2021   • ANNUAL PHYSICAL  03/13/2022   • COLONOSCOPY  12/20/2028   • HEPATITIS C SCREENING  Completed   • Pneumococcal Vaccine 0-64  Aged Out   • MENINGOCOCCAL VACCINE  Aged Out       PHQ-9 Depression Screening  Little interest or pleasure in doing things? 3   Feeling down, depressed, or hopeless? 3   Trouble falling or staying asleep, or sleeping too much? 0   Feeling tired or having little energy? 3   Poor appetite or overeating? 3   Feeling bad about yourself - or that you are a failure or have let yourself or your family down? 3   Trouble concentrating on things, such as reading the newspaper or watching television? 3   Moving or speaking so slowly that other people could have noticed? Or the opposite - being so fidgety or restless that you have been moving around a lot more than usual? 2   Thoughts that you would be better off dead, or of hurting yourself in some way? 0   PHQ-9 Total Score 20   If you checked off any problems, how difficult have these problems made it for you to do your work, take care of things  "at home, or get along with other people? Very difficult       BP (!) 147/102 (BP Location: Right arm, Patient Position: Sitting, Cuff Size: Large Adult)   Pulse 90   Temp 98.7 °F (37.1 °C) (Oral)   Resp 16   Ht 181.6 cm (71.5\")   Wt 102 kg (225 lb 3.2 oz)   SpO2 97%   BMI 30.97 kg/m²      Physical Exam    Mask worn throughout encounter  Vital signs reviewed.  General appearance: No acute distress  Eyes: conjunctiva clear without erythema; pupils equally round and reactive  ENT: external ears and nose normal; hearing normal   Neck: supple; no thyromegaly  CV: normal rate and rhythm; no peripheral edema  Respiratory: normal respiratory effort; lungs clear to auscultation bilaterally  GI: Bowel sounds x4, soft, nontender  MSK: normal gait and station; no focal joint deformity or swelling  Skin: no rash or wounds; normal turgor  Neuro: cranial nerves 2-12 grossly intact; normal sensation to light touch  Psych: mood and affect normal; recent and remote memory intact    No visits with results within 2 Week(s) from this visit.   Latest known visit with results is:   Orders Only on 02/25/2021   Component Date Value Ref Range Status   • Chlamydia trachomatis, BARON 02/25/2021 Negative  Negative Final   • Neisseria gonorrhoeae, BARON 02/25/2021 Negative  Negative Final        Diagnoses and all orders for this visit:    1. Annual physical exam (Primary)  -     Lipid Panel; Future    2. Essential hypertension    3. Mixed anxiety depressive disorder      Discussed importance of adherence to medication regimen as previously prescribed.  Keep follow-up with specialist.  He will come back fasting to lab for lipid panel, otherwise labs are up-to-date.  Delay Tdap, Shingrix and until he completes Covid vaccination series.    Encourage healthy diet and exercise.  Encourage patient to stay up to date on screening examinations as indicated based on age and risk factors.    EMR Dragon/Transcription disclaimer:    Much of this " encounter note is an electronic transcription/translation of spoken language to printed text.  The electronic translation of spoken language may permit erroneous, or at times, nonsensical words or phrases to be inadvertently transcribed.  Although I have reviewed the note for such errors some may still exist.

## 2021-03-30 ENCOUNTER — OFFICE VISIT (OUTPATIENT)
Dept: SPORTS MEDICINE | Facility: CLINIC | Age: 56
End: 2021-03-30

## 2021-03-30 VITALS
SYSTOLIC BLOOD PRESSURE: 138 MMHG | BODY MASS INDEX: 30.48 KG/M2 | WEIGHT: 225 LBS | OXYGEN SATURATION: 98 % | HEART RATE: 58 BPM | HEIGHT: 72 IN | DIASTOLIC BLOOD PRESSURE: 90 MMHG | TEMPERATURE: 97.6 F | RESPIRATION RATE: 16 BRPM

## 2021-03-30 DIAGNOSIS — F41.8 MIXED ANXIETY DEPRESSIVE DISORDER: ICD-10-CM

## 2021-03-30 DIAGNOSIS — I10 ESSENTIAL HYPERTENSION: Primary | ICD-10-CM

## 2021-03-30 DIAGNOSIS — E66.9 OBESITY (BMI 30-39.9): ICD-10-CM

## 2021-03-30 PROCEDURE — 99214 OFFICE O/P EST MOD 30 MIN: CPT | Performed by: FAMILY MEDICINE

## 2021-03-30 NOTE — PROGRESS NOTES
"Chief Complaint  med check (Would like to discuss some lab work )    Subjective          Flako Coreas presents to [facility]  History of Present Illness  1.  HTN.  Taking Rx as prescribed.  No adverse effects.  2.  Anxiety with depression.  Has been having problems with insurance company regarding finding psychiatrist though he did finally discuss some progress with insurance this morning.  Is interested in gene site testing.  3.  Obesity.  Is fasting.  Needs lipid panel drawn.    Objective   Vital Signs:   /90 (BP Location: Left arm, Patient Position: Sitting, Cuff Size: Adult)   Pulse 58   Temp 97.6 °F (36.4 °C)   Resp 16   Ht 181.6 cm (71.5\")   Wt 102 kg (225 lb)   SpO2 98%   BMI 30.94 kg/m²     Physical Exam   General: No acute distress  Psych: Calm, normal speech    Result Review :                 Assessment and Plan    Diagnoses and all orders for this visit:    1. Essential hypertension (Primary)    2. Mixed anxiety depressive disorder    3. Obesity (BMI 30-39.9)    Other orders  -     Cancel: Lipid Panel    1.  Blood pressure looks great compared to previous checks in office.  Continue current regimen.  2.  Offered [employer] testing and he is agreeable to perform this.  He will continue to pursue insurance approval for psychiatrist.  Elavil has been helpful for sleep.  3.  Labs as ordered.    I spent 30 minutes caring for Flako on this date of service. This time includes time spent by me in the following activities:preparing for the visit, reviewing tests, obtaining and/or reviewing a separately obtained history, performing a medically appropriate examination and/or evaluation , counseling and educating the patient/family/caregiver, ordering medications, tests, or procedures and documenting information in the medical record     Follow Up   No follow-ups on file.  Patient was given instructions and counseling regarding his condition or for health maintenance " advice. Please see specific information pulled into the AVS if appropriate.

## 2021-03-31 RX ORDER — ROSUVASTATIN CALCIUM 10 MG/1
10 TABLET, COATED ORAL DAILY
Qty: 90 TABLET | Refills: 1 | Status: SHIPPED | OUTPATIENT
Start: 2021-03-31 | End: 2022-02-07 | Stop reason: SDUPTHER

## 2021-04-09 ENCOUNTER — TELEPHONE (OUTPATIENT)
Dept: SPORTS MEDICINE | Facility: CLINIC | Age: 56
End: 2021-04-09

## 2021-04-09 NOTE — TELEPHONE ENCOUNTER
Patient called in wanting to know if you have reviewed [employer] results yet.   I believe I printed them out and placed them on your desk, let me know if you don't have them,i'll reprint.     Thanks  Priti

## 2021-04-12 NOTE — TELEPHONE ENCOUNTER
Would either of you please contact the patient when you have time, to schedule an appointment and to go over [employer] results in office with Dr. Rodriguez please.    Thank you  Priti

## 2021-04-13 ENCOUNTER — OFFICE VISIT (OUTPATIENT)
Dept: SPORTS MEDICINE | Facility: CLINIC | Age: 56
End: 2021-04-13

## 2021-04-13 ENCOUNTER — TELEPHONE (OUTPATIENT)
Dept: SPORTS MEDICINE | Facility: CLINIC | Age: 56
End: 2021-04-13

## 2021-04-13 VITALS
TEMPERATURE: 97.5 F | SYSTOLIC BLOOD PRESSURE: 140 MMHG | OXYGEN SATURATION: 98 % | BODY MASS INDEX: 31.5 KG/M2 | HEART RATE: 71 BPM | HEIGHT: 71 IN | DIASTOLIC BLOOD PRESSURE: 90 MMHG | WEIGHT: 225 LBS

## 2021-04-13 DIAGNOSIS — F41.8 MIXED ANXIETY DEPRESSIVE DISORDER: Primary | ICD-10-CM

## 2021-04-13 PROBLEM — R10.31 CHRONIC BILATERAL LOWER ABDOMINAL PAIN: Chronic | Status: ACTIVE | Noted: 2018-08-10

## 2021-04-13 PROBLEM — I10 ESSENTIAL HYPERTENSION: Chronic | Status: ACTIVE | Noted: 2017-06-26

## 2021-04-13 PROBLEM — G89.29 CHRONIC BILATERAL LOWER ABDOMINAL PAIN: Chronic | Status: ACTIVE | Noted: 2018-08-10

## 2021-04-13 PROBLEM — R10.32 CHRONIC BILATERAL LOWER ABDOMINAL PAIN: Chronic | Status: ACTIVE | Noted: 2018-08-10

## 2021-04-13 PROBLEM — E78.2 MIXED HYPERLIPIDEMIA: Chronic | Status: ACTIVE | Noted: 2017-06-26

## 2021-04-13 PROCEDURE — 99214 OFFICE O/P EST MOD 30 MIN: CPT | Performed by: FAMILY MEDICINE

## 2021-04-13 RX ORDER — VILAZODONE HYDROCHLORIDE 10 MG-20MG
KIT ORAL
Qty: 30 EACH | Refills: 0 | Status: SHIPPED | OUTPATIENT
Start: 2021-04-13 | End: 2021-05-11

## 2021-04-13 NOTE — TELEPHONE ENCOUNTER
Provider:    Caller: SELF    Relationship to Patient: SELF    Pharmacy: [facility]- [phone]     Phone Number: [phone]    Reason for Call:  PT. STATES THAT HIS INSURANCE NEEDS A PRIOR AUTH FOR THE MEDICATION VIIBRYD STARTER PACK.   IT IS [facility] [employer] - [phone]  PLEASE CALL PT. TO LET THIS KNOW WHEN THIS IS DONE.

## 2021-04-13 NOTE — PROGRESS NOTES
"Chief Complaint  Follow-up (here for Genesight results and discussion )    Subjective          Flako Coreas presents to [facility]  History of Present Illness  Still having difficulty with contacting a psychiatrist.  Has been on Wellbutrin, Effexor in the past with not much efficacy.  Still taking Elavil nightly.  Here to discuss GeneSight testing.    Objective   Vital Signs:   /90 (BP Location: Right arm, Patient Position: Sitting, Cuff Size: Adult)   Pulse 71   Temp 97.5 °F (36.4 °C)   Ht 181.6 cm (71.5\")   Wt 102 kg (225 lb)   SpO2 98%   BMI 30.95 kg/m²     Physical Exam  General: No acute distress  Psych: Appropriate today.  Normal conversation, speech is not pressured.    Result Review :              Gene site testing was reviewed with patient.  Antidepressants that can be used as directed: Alejandrina BucknerdZander.     Assessment and Plan    Diagnoses and all orders for this visit:    1. Mixed anxiety depressive disorder (Primary)  -     Vilazodone HCl (Viibryd Starter Pack) 10 & 20 MG kit; Take 10 mg by mouth Daily for 7 days, THEN 20 mg Daily for 21 days.  Dispense: 30 each; Refill: 0      Trial of Viibryd based on GeneSight analysis.  Has been on Wellbutrin, Effexor in the past which are recommended to stay away from based on analysis.  Follow-up in 4 weeks, telephone visit to reevaluate.      Follow Up   Return in about 4 weeks (around 5/11/2021) for Recheck - telephone visit.  Patient was given instructions and counseling regarding his condition or for health maintenance advice. Please see specific information pulled into the AVS if appropriate.       "

## 2021-05-11 ENCOUNTER — OFFICE VISIT (OUTPATIENT)
Dept: SPORTS MEDICINE | Facility: CLINIC | Age: 56
End: 2021-05-11

## 2021-05-11 DIAGNOSIS — F41.8 MIXED ANXIETY DEPRESSIVE DISORDER: Primary | ICD-10-CM

## 2021-05-11 PROCEDURE — 99212 OFFICE O/P EST SF 10 MIN: CPT | Performed by: FAMILY MEDICINE

## 2021-05-11 RX ORDER — VILAZODONE HYDROCHLORIDE 20 MG/1
20 TABLET ORAL DAILY
Qty: 90 TABLET | Refills: 1 | Status: SHIPPED | OUTPATIENT
Start: 2021-05-11 | End: 2021-11-18 | Stop reason: SDUPTHER

## 2021-05-11 NOTE — PROGRESS NOTES
"Telephone Visit Note    Chief Complaint   Patient presents with   • Follow-up     4 wk f/u for depression medication review          History of Present Illness  \"I am doing great\".  Medication has been very helpful as far as concentration and overall mood.  He has had no side effects from medication.  He completed titration pack as recommended and is currently on 20 mg dose.  Request refill.      Diagnoses and all orders for this visit:    Mixed anxiety depressive disorder    Other orders  -     vilazodone (Viibryd) 20 MG tablet tablet; Take 1 tablet by mouth Daily.      Pleased with his progress.  Continue medication at 20 mg dose.  Continue coping skills and seeking out counseling should his symptoms worsen.  Follow-up in office in 6 months or sooner if needed.    This patient was evaluated by telephone due to current precautions with COVID-19.  Consent to telephone visit for this problem was given by the patient. I spent a total of 10 minutes on the phone with the patient.  "

## 2021-06-30 DIAGNOSIS — I10 ESSENTIAL HYPERTENSION: ICD-10-CM

## 2021-06-30 DIAGNOSIS — F41.8 MIXED ANXIETY DEPRESSIVE DISORDER: ICD-10-CM

## 2021-07-01 RX ORDER — LISINOPRIL 20 MG/1
20 TABLET ORAL 2 TIMES DAILY
Qty: 180 TABLET | Refills: 1 | Status: SHIPPED | OUTPATIENT
Start: 2021-07-01 | End: 2022-02-07 | Stop reason: SDUPTHER

## 2021-07-01 RX ORDER — VILAZODONE HYDROCHLORIDE 10 MG-20MG
KIT ORAL
OUTPATIENT
Start: 2021-07-01

## 2021-07-01 RX ORDER — AMLODIPINE BESYLATE 10 MG/1
TABLET ORAL
Qty: 90 TABLET | Refills: 0 | OUTPATIENT
Start: 2021-07-01

## 2021-07-01 RX ORDER — AMLODIPINE BESYLATE 10 MG/1
10 TABLET ORAL DAILY
Qty: 90 TABLET | Refills: 1 | Status: SHIPPED | OUTPATIENT
Start: 2021-07-01 | End: 2022-02-07 | Stop reason: SDUPTHER

## 2021-07-01 RX ORDER — LISINOPRIL 20 MG/1
TABLET ORAL
Qty: 180 TABLET | Refills: 0 | OUTPATIENT
Start: 2021-07-01

## 2021-07-01 NOTE — TELEPHONE ENCOUNTER
Last eval in office for medication was 03/30/2021   viibryd rx was sent in on 05/11/2021 for #90 with 1 refill     Thanks  Priti

## 2021-11-18 RX ORDER — VILAZODONE HYDROCHLORIDE 20 MG/1
20 TABLET ORAL DAILY
Qty: 90 TABLET | Refills: 1 | Status: SHIPPED | OUTPATIENT
Start: 2021-11-18 | End: 2022-02-07 | Stop reason: SDUPTHER

## 2021-12-27 ENCOUNTER — TELEPHONE (OUTPATIENT)
Dept: SPORTS MEDICINE | Facility: CLINIC | Age: 56
End: 2021-12-27

## 2021-12-27 NOTE — TELEPHONE ENCOUNTER
Caller: Flako Coreas    Relationship to patient: Self    Best call back number: [phone]    Chief complaint:     Type of visit:     Requested date:     If rescheduling, when is the original appointment:     Additional notes:PATIENT HAD AN APPT TODAY WITH DR EVANS BUT WANTED TO CANCEL.HE IS ASKING IF IT CAN BE CHANGED TO A PHONE CALL OR A [employer] VIDEO APPT. ATTEMPTED TO WARM TRANSFER. PLEASE ADVISE.

## 2022-01-17 ENCOUNTER — TELEPHONE (OUTPATIENT)
Dept: ORTHOPEDIC SURGERY | Facility: CLINIC | Age: 57
End: 2022-01-17

## 2022-01-17 NOTE — TELEPHONE ENCOUNTER
I just realized I have not seen this patient yet think it would be very hard to do this without really seeing him I do see the MRI but you couple that with what you see clinically

## 2022-01-17 NOTE — TELEPHONE ENCOUNTER
Caller: NIMESH  Relationship to Patient: SELF    Phone Number: [phone]  Reason for Call: PT CALLED STATING THAT HE HAS TO CANCEL HIS APPT WITH DR. BAIRES TODAY DUE TO HIS MOTHER BEING IN THE HOSPITAL WITH COVID PENUMONIA(HE IS HER PRIMARY CAREGIVER) HE STATES THAT HE DROPPED OFF IMAGING DISC 1/14/2022 AND WAS HOPING THAT DR. BAIRES WOULD BE WILLING TO DO A TELEPHONE VISIT. PLEASE ADVISE PT AT ABOVE PHONE NUMBER.

## 2022-01-17 NOTE — TELEPHONE ENCOUNTER
The images are in [facility].  I located report in care everywhere.    Here are results copied and pasted:  IMPRESSION:   1. Full-thickness, full width tear of the right supraspinatus tendon with tendon retraction to the superior-medial aspect of the humeral head. There is no supraspinatus muscle atrophy.     2. High-grade partial-thickness tears of the anterior right infraspinatus tendon insertion. There is low-grade muscle strain of the right infraspinatus muscle (MRI grade 1 strain). There is a benign intramuscular lipoma of the posterior-superficial   surface of the right infraspinatus muscle but there is no muscle atrophy.     3. Tendinosis of the right subscapularis tendon and long head of biceps tendon without tear     4. Degenerative tears of the superior central and posterior superior glenoid labrum     5. Type III acromion, small broad-based inferior spurring of the distal acromion, and moderate arthropathy of the right AC joint.

## 2022-02-07 ENCOUNTER — OFFICE VISIT (OUTPATIENT)
Dept: SPORTS MEDICINE | Facility: CLINIC | Age: 57
End: 2022-02-07

## 2022-02-07 VITALS
HEART RATE: 72 BPM | RESPIRATION RATE: 16 BRPM | HEIGHT: 74 IN | SYSTOLIC BLOOD PRESSURE: 138 MMHG | DIASTOLIC BLOOD PRESSURE: 88 MMHG | OXYGEN SATURATION: 98 % | WEIGHT: 220 LBS | TEMPERATURE: 98.6 F | BODY MASS INDEX: 28.23 KG/M2

## 2022-02-07 DIAGNOSIS — E78.2 MIXED HYPERLIPIDEMIA: Chronic | ICD-10-CM

## 2022-02-07 DIAGNOSIS — I10 ESSENTIAL HYPERTENSION: Primary | ICD-10-CM

## 2022-02-07 DIAGNOSIS — D18.01 HEMANGIOMA OF SKIN: ICD-10-CM

## 2022-02-07 DIAGNOSIS — F41.8 MIXED ANXIETY DEPRESSIVE DISORDER: ICD-10-CM

## 2022-02-07 PROCEDURE — 99214 OFFICE O/P EST MOD 30 MIN: CPT | Performed by: FAMILY MEDICINE

## 2022-02-07 RX ORDER — VILAZODONE HYDROCHLORIDE 40 MG/1
40 TABLET ORAL DAILY
Qty: 90 TABLET | Refills: 0 | Status: SHIPPED | OUTPATIENT
Start: 2022-02-07 | End: 2022-05-11 | Stop reason: SDUPTHER

## 2022-02-07 RX ORDER — AMLODIPINE BESYLATE 10 MG/1
10 TABLET ORAL DAILY
Qty: 90 TABLET | Refills: 0 | Status: SHIPPED | OUTPATIENT
Start: 2022-02-07 | End: 2022-05-11 | Stop reason: SDUPTHER

## 2022-02-07 RX ORDER — ROSUVASTATIN CALCIUM 10 MG/1
10 TABLET, COATED ORAL DAILY
Qty: 90 TABLET | Refills: 0 | Status: SHIPPED | OUTPATIENT
Start: 2022-02-07 | End: 2022-05-11 | Stop reason: SDUPTHER

## 2022-02-07 RX ORDER — LISINOPRIL 20 MG/1
20 TABLET ORAL 2 TIMES DAILY
Qty: 180 TABLET | Refills: 0 | Status: SHIPPED | OUTPATIENT
Start: 2022-02-07 | End: 2022-05-11 | Stop reason: SDUPTHER

## 2022-02-07 NOTE — PROGRESS NOTES
"Flako is a 56 y.o. year old male presents to [facility]    Chief Complaint   Patient presents with   • med check   • Suspicious Skin Lesion     Pt states he has two moles he would like looked at on his head.        History of Present Illness  1.  Hypertension.  Stable.  Updated med list today.  Needs refills on lisinopril, amlodipine.  2.  Has noticed skin lesions behind his right ear, 2 for years.  Thinks that they may be changing size.  He does admit the lower 1 when he shaves and it does bleed.  3.  Depression.  Acutely worsening.  Unfortunately, he lost his mother recently due to Covid.  Is dealing with several stressors in his life.  Is inquiring about adjusting dose of Viibryd.  4.  HLD.  Reviewed previous panel.  Has been out of rosuvastatin and this needs refill today.    I have reviewed the patient's medical, family, and social history in detail and updated the computerized patient record.    /88 (BP Location: Right arm, Patient Position: Sitting, Cuff Size: Adult)   Pulse 72   Temp 98.6 °F (37 °C)   Resp 16   Ht 188 cm (74\")   Wt 99.8 kg (220 lb)   SpO2 98%   BMI 28.25 kg/m²      Physical Exam    Mask worn thru encounter  Vital signs reviewed.   General: No acute distress.  Eyes: conjunctiva clear; pupils equally round and reactive  ENT: external ears atraumatic  CV: no peripheral edema  Resp: normal respiratory effort, no use of accessory muscles  Skin: no rashes or wounds; normal turgor; 2 hemangiomas, subcentimeter in diameter posterior to the ear on the right  Psych: mood and affect appropriate; recent and remote memory intact  Neuro: sensation to light touch intact               Diagnoses and all orders for this visit:    Essential hypertension  -     amLODIPine (NORVASC) 10 MG tablet; Take 1 tablet by mouth Daily.  -     lisinopril (PRINIVIL,ZESTRIL) 20 MG tablet; Take 1 tablet by mouth 2 (Two) Times a Day.    Hemangioma of skin  -     Ambulatory " Referral to Dermatology    Mixed anxiety depressive disorder  -     vilazodone (Viibryd) 40 MG tablet tablet; Take 1 tablet by mouth Daily.    Mixed hyperlipidemia  -     rosuvastatin (Crestor) 10 MG tablet; Take 1 tablet by mouth Daily.    1.  Stable and at goal.  Refill medications.  2.  Likely benign.  Refer to dermatology.  3.  Adjust dose of Viibryd to 40 mg daily, next dose.  4.  Refill Crestor.      Follow Up   Return in about 3 months (around 5/7/2022) for CPE.  Patient was given instructions and counseling regarding his condition or for health maintenance advice. Please see specific information pulled into the AVS if appropriate.     EMR Dragon/Transcription disclaimer:    Much of this encounter note is an electronic transcription/translation of spoken language to printed text.  The electronic translation of spoken language may permit erroneous, or at times, nonsensical words or phrases to be inadvertently transcribed.  Although I have reviewed the note for such errors some may still exist.

## 2022-04-30 NOTE — TELEPHONE ENCOUNTER
Pt called requesting a medication to be called in for poison ivy, rash spreading all over. Please advise.    Pt in no acute distress  Ambulates with a steady gait with crutches   Verbalizes understanding of discharge instructions       Oly Aragon RN  04/30/22 [id number]

## 2022-05-11 ENCOUNTER — OFFICE VISIT (OUTPATIENT)
Dept: SPORTS MEDICINE | Facility: CLINIC | Age: 57
End: 2022-05-11

## 2022-05-11 VITALS
BODY MASS INDEX: 28.36 KG/M2 | OXYGEN SATURATION: 96 % | RESPIRATION RATE: 16 BRPM | HEART RATE: 60 BPM | TEMPERATURE: 98.2 F | WEIGHT: 221 LBS | SYSTOLIC BLOOD PRESSURE: 128 MMHG | HEIGHT: 74 IN | DIASTOLIC BLOOD PRESSURE: 90 MMHG

## 2022-05-11 DIAGNOSIS — Z12.5 SCREENING FOR PROSTATE CANCER: ICD-10-CM

## 2022-05-11 DIAGNOSIS — Z00.00 ANNUAL PHYSICAL EXAM: Primary | ICD-10-CM

## 2022-05-11 DIAGNOSIS — F41.8 MIXED ANXIETY DEPRESSIVE DISORDER: Chronic | ICD-10-CM

## 2022-05-11 DIAGNOSIS — I10 ESSENTIAL HYPERTENSION: Chronic | ICD-10-CM

## 2022-05-11 DIAGNOSIS — E78.2 MIXED HYPERLIPIDEMIA: Chronic | ICD-10-CM

## 2022-05-11 DIAGNOSIS — Z23 IMMUNIZATION DUE: ICD-10-CM

## 2022-05-11 PROCEDURE — 99396 PREV VISIT EST AGE 40-64: CPT | Performed by: FAMILY MEDICINE

## 2022-05-11 RX ORDER — IBUPROFEN 600 MG/1
600 TABLET ORAL EVERY 8 HOURS PRN
COMMUNITY
Start: 2021-12-31 | End: 2022-12-12 | Stop reason: HOSPADM

## 2022-05-11 RX ORDER — LISINOPRIL 20 MG/1
20 TABLET ORAL 2 TIMES DAILY
Qty: 180 TABLET | Refills: 1 | Status: SHIPPED | OUTPATIENT
Start: 2022-05-11 | End: 2022-11-11 | Stop reason: SDUPTHER

## 2022-05-11 RX ORDER — AMITRIPTYLINE HYDROCHLORIDE 25 MG/1
25 TABLET, FILM COATED ORAL NIGHTLY
Qty: 90 TABLET | Refills: 1 | Status: SHIPPED | OUTPATIENT
Start: 2022-05-11

## 2022-05-11 RX ORDER — ROSUVASTATIN CALCIUM 10 MG/1
10 TABLET, COATED ORAL DAILY
Qty: 90 TABLET | Refills: 1 | Status: SHIPPED | OUTPATIENT
Start: 2022-05-11

## 2022-05-11 RX ORDER — AMLODIPINE BESYLATE 10 MG/1
10 TABLET ORAL DAILY
Qty: 90 TABLET | Refills: 1 | Status: SHIPPED | OUTPATIENT
Start: 2022-05-11 | End: 2022-11-11 | Stop reason: SDUPTHER

## 2022-05-11 RX ORDER — VILAZODONE HYDROCHLORIDE 40 MG/1
40 TABLET ORAL DAILY
Qty: 90 TABLET | Refills: 1 | Status: SHIPPED | OUTPATIENT
Start: 2022-05-11 | End: 2022-11-11 | Stop reason: SDUPTHER

## 2022-05-11 RX ORDER — ZOSTER VACCINE RECOMBINANT, ADJUVANTED 50 MCG/0.5
50 KIT INTRAMUSCULAR ONCE
Qty: 1 EACH | Refills: 0 | Status: SHIPPED | OUTPATIENT
Start: 2022-05-11 | End: 2022-05-11

## 2022-05-11 NOTE — PROGRESS NOTES
"Flako Coreas is here today for an annual physical exam.     Eating a healthy diet. Exercising routinely.     Concerned about difficulty concentrating.   Htn: rx as written.  Hld: rx.  Depression: doing well on Viibryd.    S/p r rtc repair. Full ROM w/o pain. Still in PT.    I have reviewed the patient's medical, family, and social history in detail and updated the computerized patient record.    Health Maintenance   Topic Date Due   • ZOSTER VACCINE (1 of 2) Never done   • ANNUAL PHYSICAL  03/13/2022   • LIPID PANEL  03/30/2022   • COVID-19 Vaccine (3 - Booster for Pfizer series) 05/29/2022   • INFLUENZA VACCINE  08/01/2022   • COLORECTAL CANCER SCREENING  12/20/2028   • TDAP/TD VACCINES (2 - Td or Tdap) 11/19/2031   • HEPATITIS C SCREENING  Completed   • Pneumococcal Vaccine 0-64  Aged Out         /90 (BP Location: Left arm, Patient Position: Sitting, Cuff Size: Adult)   Pulse 60   Temp 98.2 °F (36.8 °C) (Temporal)   Resp 16   Ht 188 cm (74\")   Wt 100 kg (221 lb)   SpO2 96%   BMI 28.37 kg/m²      Physical Exam    Mask worn throughout encounter  Vital signs reviewed.  General appearance: No acute distress  Eyes: conjunctiva clear without erythema; pupils equally round and reactive  ENT: external ears and nose normal; hearing normal   Neck: supple; no thyromegaly  CV: normal rate and rhythm; no peripheral edema  Respiratory: normal respiratory effort; lungs clear to auscultation bilaterally  GI: Bowel sounds x4, soft, nontender  MSK: normal gait and station; no focal joint deformity or swelling  Skin: no rash or wounds; normal turgor  Neuro: cranial nerves 2-12 grossly intact; normal sensation to light touch  Psych: mood and affect normal; recent and remote memory intact    No visits with results within 2 Week(s) from this visit.   Latest known visit with results is:   Admission on 12/28/2021, Discharged on 12/28/2021   Component Date Value Ref Range Status   • Rapid Strep A Screen 12/28/2021 Negative "  Negative, VALID, INVALID, Not Performed Final   • Internal Control 12/28/2021 Passed  Passed Final   • Lot Number 12/28/2021 [id number]   Final   • Expiration Date 12/28/2021 04/30/2022   Final   • Rapid Influenza A Ag 12/28/2021 Negative  Negative Final   • Rapid Influenza B Ag 12/28/2021 Negative  Negative Final   • Internal Control 12/28/2021 Passed  Passed Final   • Lot Number 12/28/2021 [id number]   Final   • Expiration Date 12/28/2021 12/04/2022   Final   • SARS-CoV-2, BARON 12/28/2021 Not Detected  Not Detected Final    Comment: This nucleic acid amplification test was developed and its performance  characteristics determined by [employer]. Nucleic acid  amplification tests include RT-PCR and TMA. This test has not been  FDA cleared or approved. This test has been authorized by FDA under  an Emergency Use Authorization (EUA). This test is only authorized  for the duration of time the declaration that circumstances exist  justifying the authorization of the emergency use of in vitro  diagnostic tests for detection of SARS-CoV-2 virus and/or diagnosis  of COVID-19 infection under section 564(b)(1) of the Act, 21 U.S.C.  360bbb-3(b) (1), unless the authorization is terminated or revoked  sooner.  When diagnostic testing is negative, the possibility of a false  negative result should be considered in the context of a patient's  recent exposures and the presence of clinical signs and symptoms  consistent with COVID-19. An individual without symptoms of COVID-19  and who is not shedding SARS-CoV-2 virus                            would expect to have a  negative (not detected) result in this assay.   • Beta Strep Gp A Culture 12/28/2021 Negative   Final   • [facility] SARS-COV-2, BARON 2 DAY TAT 12/28/2021 Performed   Final        Diagnoses and all orders for this visit:    1. Annual physical exam (Primary)    2. Mixed hyperlipidemia    3. Mixed anxiety depressive disorder    4. Essential hypertension    5.  Immunization due    6. Screening for prostate cancer        Encourage healthy diet and exercise.  Encourage patient to stay up to date on screening examinations as indicated based on age and risk factors.    EMR Dragon/Transcription disclaimer:    Much of this encounter note is an electronic transcription/translation of spoken language to printed text.  The electronic translation of spoken language may permit erroneous, or at times, nonsensical words or phrases to be inadvertently transcribed.  Although I have reviewed the note for such errors some may still exist.

## 2022-05-12 LAB
ALBUMIN SERPL-MCNC: 4.5 G/DL (ref 3.8–4.9)
ALBUMIN/GLOB SERPL: 1.8 {RATIO} (ref 1.2–2.2)
ALP SERPL-CCNC: 74 IU/L (ref 44–121)
ALT SERPL-CCNC: 29 IU/L (ref 0–44)
AST SERPL-CCNC: 21 IU/L (ref 0–40)
BASOPHILS # BLD AUTO: 0 X10E3/UL (ref 0–0.2)
BASOPHILS NFR BLD AUTO: 0 %
BILIRUB SERPL-MCNC: 0.7 MG/DL (ref 0–1.2)
BUN SERPL-MCNC: 16 MG/DL (ref 6–24)
BUN/CREAT SERPL: 15 (ref 9–20)
CALCIUM SERPL-MCNC: 9.6 MG/DL (ref 8.7–10.2)
CHLORIDE SERPL-SCNC: 103 MMOL/L (ref 96–106)
CHOLEST SERPL-MCNC: 215 MG/DL (ref 100–199)
CO2 SERPL-SCNC: 24 MMOL/L (ref 20–29)
CREAT SERPL-MCNC: 1.05 MG/DL (ref 0.76–1.27)
EGFRCR SERPLBLD CKD-EPI 2021: 84 ML/MIN/1.73
EOSINOPHIL # BLD AUTO: 0.3 X10E3/UL (ref 0–0.4)
EOSINOPHIL NFR BLD AUTO: 5 %
ERYTHROCYTE [DISTWIDTH] IN BLOOD BY AUTOMATED COUNT: 14.4 % (ref 11.6–15.4)
GLOBULIN SER CALC-MCNC: 2.5 G/DL (ref 1.5–4.5)
GLUCOSE SERPL-MCNC: 88 MG/DL (ref 65–99)
HBA1C MFR BLD: 5.3 % (ref 4.8–5.6)
HCT VFR BLD AUTO: 46.6 % (ref 37.5–51)
HDLC SERPL-MCNC: 46 MG/DL
HGB BLD-MCNC: 15.2 G/DL (ref 13–17.7)
IMM GRANULOCYTES # BLD AUTO: 0 X10E3/UL (ref 0–0.1)
IMM GRANULOCYTES NFR BLD AUTO: 0 %
LDLC SERPL CALC-MCNC: 142 MG/DL (ref 0–99)
LYMPHOCYTES # BLD AUTO: 1.9 X10E3/UL (ref 0.7–3.1)
LYMPHOCYTES NFR BLD AUTO: 26 %
MCH RBC QN AUTO: 27.9 PG (ref 26.6–33)
MCHC RBC AUTO-ENTMCNC: 32.6 G/DL (ref 31.5–35.7)
MCV RBC AUTO: 86 FL (ref 79–97)
MONOCYTES # BLD AUTO: 0.6 X10E3/UL (ref 0.1–0.9)
MONOCYTES NFR BLD AUTO: 8 %
NEUTROPHILS # BLD AUTO: 4.5 X10E3/UL (ref 1.4–7)
NEUTROPHILS NFR BLD AUTO: 61 %
PLATELET # BLD AUTO: 272 X10E3/UL (ref 150–450)
POTASSIUM SERPL-SCNC: 4.3 MMOL/L (ref 3.5–5.2)
PROT SERPL-MCNC: 7 G/DL (ref 6–8.5)
PSA SERPL-MCNC: 0.2 NG/ML (ref 0–4)
RBC # BLD AUTO: 5.45 X10E6/UL (ref 4.14–5.8)
SODIUM SERPL-SCNC: 144 MMOL/L (ref 134–144)
TRIGL SERPL-MCNC: 149 MG/DL (ref 0–149)
VLDLC SERPL CALC-MCNC: 27 MG/DL (ref 5–40)
WBC # BLD AUTO: 7.4 X10E3/UL (ref 3.4–10.8)

## 2022-09-21 ENCOUNTER — TELEPHONE (OUTPATIENT)
Dept: SPORTS MEDICINE | Facility: CLINIC | Age: 57
End: 2022-09-21

## 2022-09-21 NOTE — TELEPHONE ENCOUNTER
Patient called in and left a voicemail today stating that he has new insurance and now his Viibryd RX needs a PA.   I returned a call to the patient and informed I would contact his pharmacy to get information and then start PA process since he was not able to provide me with new insurance information.  I called and spoke with [facility] , insurance information was provided to me.   Plan : [facility] plan  ID# [id number]  BIN# [id number]  PCN: [facility]  RX GROUP:      PA submitted to [employer] via Covermymeds today, pending review.   Will update when able      Thanks  Priti

## 2022-09-28 NOTE — TELEPHONE ENCOUNTER
Outgoing call to [employer] since I have not heard anything in regards to PA. Spoke with rep Siion, informed rx was denied due to patient not having a trial of 3 medications prior to the Viibryd.   Information provided to be for appeals department with insurance, phone# [phone] fax# [phone]   Attempted to contact appeals department, no answer when called, left a detailed voicemail requesting to initiate appeal, faxing documents to appeals department for processing today.      Thanks  Priti

## 2022-11-11 ENCOUNTER — OFFICE VISIT (OUTPATIENT)
Dept: SPORTS MEDICINE | Facility: CLINIC | Age: 57
End: 2022-11-11

## 2022-11-11 VITALS
SYSTOLIC BLOOD PRESSURE: 138 MMHG | HEART RATE: 77 BPM | RESPIRATION RATE: 16 BRPM | TEMPERATURE: 98 F | WEIGHT: 223 LBS | HEIGHT: 74 IN | BODY MASS INDEX: 28.62 KG/M2 | DIASTOLIC BLOOD PRESSURE: 90 MMHG | OXYGEN SATURATION: 97 %

## 2022-11-11 DIAGNOSIS — Z23 IMMUNIZATION DUE: ICD-10-CM

## 2022-11-11 DIAGNOSIS — I10 ESSENTIAL HYPERTENSION: Primary | ICD-10-CM

## 2022-11-11 DIAGNOSIS — M79.641 PAIN OF RIGHT HAND: ICD-10-CM

## 2022-11-11 DIAGNOSIS — E78.2 MIXED HYPERLIPIDEMIA: ICD-10-CM

## 2022-11-11 DIAGNOSIS — F41.8 MIXED ANXIETY DEPRESSIVE DISORDER: ICD-10-CM

## 2022-11-11 PROCEDURE — 90686 IIV4 VACC NO PRSV 0.5 ML IM: CPT | Performed by: FAMILY MEDICINE

## 2022-11-11 PROCEDURE — 99214 OFFICE O/P EST MOD 30 MIN: CPT | Performed by: FAMILY MEDICINE

## 2022-11-11 PROCEDURE — 90471 IMMUNIZATION ADMIN: CPT | Performed by: FAMILY MEDICINE

## 2022-11-11 RX ORDER — LISINOPRIL 20 MG/1
20 TABLET ORAL 2 TIMES DAILY
Qty: 180 TABLET | Refills: 1 | Status: SHIPPED | OUTPATIENT
Start: 2022-11-11 | End: 2022-12-12 | Stop reason: HOSPADM

## 2022-11-11 RX ORDER — AMLODIPINE BESYLATE 10 MG/1
10 TABLET ORAL DAILY
Qty: 90 TABLET | Refills: 1 | Status: SHIPPED | OUTPATIENT
Start: 2022-11-11

## 2022-11-11 RX ORDER — VILAZODONE HYDROCHLORIDE 40 MG/1
40 TABLET ORAL DAILY
Qty: 90 TABLET | Refills: 1 | Status: SHIPPED | OUTPATIENT
Start: 2022-11-11

## 2022-11-11 NOTE — PROGRESS NOTES
"Flako is a 57 y.o. year old male presents to [facility]    Chief Complaint   Patient presents with   • Follow-up     6 mo f/u from CPE on 05/11/2022        History of Present Illness  HTN: Rx as written.  No adverse effects.  HLD: Crestor 10 mg.  Was inquiring about his heart health as he is aging though reassured him that I did check his lipid panel at his physical earlier this year.  Anxiety with depression: He did have a lapse in insurance coverage and for that reason, was off of Viibryd for 2 months approximately.  Noticed significant increase in his symptoms at that time though he is now feeling \"good\" since he has been back on Viibryd.    New problem: Right hand pain.  He was trying to open a box, punched a box approximately 3 months ago.  Denies Endor stiffness especially at nighttime in his third digit.  Does have swelling along the knuckle.    I have reviewed the patient's medical, family, and social history in detail and updated the computerized patient record.    /90 (BP Location: Left arm, Patient Position: Sitting, Cuff Size: Adult)   Pulse 77   Temp 98 °F (36.7 °C) (Temporal)   Resp 16   Ht 188 cm (74.02\")   Wt 101 kg (223 lb)   SpO2 97%   BMI 28.62 kg/m²      Physical Exam    Mask worn thru encounter  Vital signs reviewed.   General: No acute distress.  Eyes: conjunctiva clear; pupils equally round and reactive  ENT: external ears atraumatic  CV: no peripheral edema  Resp: normal respiratory effort, no use of accessory muscles  Skin: no rashes or wounds; normal turgor  Psych: mood and affect appropriate; recent and remote memory intact  Neuro: sensation to light touch intact    MSK Exam  R hand: Soft tissue swelling noted along the PIP joint of the third digit.  No triggering.      Common labs    Common Labs 2/14/22 5/11/22 5/11/22 5/11/22 5/11/22 5/11/22 6/29/22     0935 0935 0935 0935 0935    Glucose   88       BUN   16       Creatinine   1.05     "   Sodium   144       Potassium   4.3       Chloride   103       Calcium   9.6       Total Protein   7.0       Albumin   4.5       Total Bilirubin   0.7       Alkaline Phosphatase   74       AST (SGOT)   21       ALT (SGPT)   29       WBC 9.35 7.4     9.48   Hemoglobin 14.0 15.2     15.0   Hematocrit 43.4 46.6     45.1   Platelets 231 272     256   Total Cholesterol    215 (A)      Triglycerides    149      HDL Cholesterol    46      LDL Cholesterol     142 (A)      Hemoglobin A1C      5.3    PSA     0.2     (A) Abnormal value       Comments are available for some flowsheets but are not being displayed.                    Diagnoses and all orders for this visit:    Essential hypertension  -     lisinopril (PRINIVIL,ZESTRIL) 20 MG tablet; Take 1 tablet by mouth 2 (Two) Times a Day.  -     amLODIPine (NORVASC) 10 MG tablet; Take 1 tablet by mouth Daily.    Mixed hyperlipidemia    Mixed anxiety depressive disorder  -     vilazodone (Viibryd) 40 MG tablet tablet; Take 1 tablet by mouth Daily.    Pain of right hand  -     Ambulatory Referral to Hand Surgery    Immunization due  -     FluLaval/Fluarix/Fluzone >6 Months    Refill medications.  Flu shot today.    Refer to hand surgery given the chronicity of his hand symptoms.      Follow Up {Instructions Charge Capture  Follow-up Communications :23}  Return in about 6 months (around 5/11/2023) for CPE.  Patient was given instructions and counseling regarding his condition or for health maintenance advice. Please see specific information pulled into the AVS if appropriate.     EMR Dragon/Transcription disclaimer:    Much of this encounter note is an electronic transcription/translation of spoken language to printed text.  The electronic translation of spoken language may permit erroneous, or at times, nonsensical words or phrases to be inadvertently transcribed.  Although I have reviewed the note for such errors some may still exist.

## 2022-12-02 ENCOUNTER — APPOINTMENT (OUTPATIENT)
Dept: CT IMAGING | Facility: HOSPITAL | Age: 57
End: 2022-12-02

## 2022-12-02 ENCOUNTER — HOSPITAL ENCOUNTER (EMERGENCY)
Facility: HOSPITAL | Age: 57
Discharge: HOME | End: 2022-12-02
Attending: EMERGENCY MEDICINE | Admitting: EMERGENCY MEDICINE

## 2022-12-02 ENCOUNTER — APPOINTMENT (OUTPATIENT)
Dept: GENERAL RADIOLOGY | Facility: HOSPITAL | Age: 57
End: 2022-12-02

## 2022-12-02 VITALS
DIASTOLIC BLOOD PRESSURE: 122 MMHG | OXYGEN SATURATION: 96 % | HEIGHT: 74 IN | RESPIRATION RATE: 18 BRPM | BODY MASS INDEX: 28.23 KG/M2 | HEART RATE: 60 BPM | SYSTOLIC BLOOD PRESSURE: 184 MMHG | TEMPERATURE: 97.7 F | WEIGHT: 220 LBS

## 2022-12-02 DIAGNOSIS — R93.5 ABNORMAL CT OF THE ABDOMEN: ICD-10-CM

## 2022-12-02 DIAGNOSIS — K29.00 ACUTE GASTRITIS WITHOUT HEMORRHAGE, UNSPECIFIED GASTRITIS TYPE: Primary | ICD-10-CM

## 2022-12-02 DIAGNOSIS — R10.32 LLQ PAIN: ICD-10-CM

## 2022-12-02 LAB
ALBUMIN SERPL-MCNC: 4.7 G/DL (ref 3.5–5.2)
ALBUMIN/GLOB SERPL: 1.7 G/DL
ALP SERPL-CCNC: 86 U/L (ref 39–117)
ALT SERPL W P-5'-P-CCNC: 26 U/L (ref 1–41)
ANION GAP SERPL CALCULATED.3IONS-SCNC: 10.7 MMOL/L (ref 5–15)
AST SERPL-CCNC: 18 U/L (ref 1–40)
BASOPHILS # BLD AUTO: 0.03 10*3/MM3 (ref 0–0.2)
BASOPHILS NFR BLD AUTO: 0.5 % (ref 0–1.5)
BILIRUB SERPL-MCNC: 0.8 MG/DL (ref 0–1.2)
BILIRUB UR QL STRIP: NEGATIVE
BUN SERPL-MCNC: 9 MG/DL (ref 6–20)
BUN/CREAT SERPL: 9.2 (ref 7–25)
CALCIUM SPEC-SCNC: 9.3 MG/DL (ref 8.6–10.5)
CHLORIDE SERPL-SCNC: 96 MMOL/L (ref 98–107)
CLARITY UR: CLEAR
CO2 SERPL-SCNC: 27.3 MMOL/L (ref 22–29)
COLOR UR: YELLOW
CREAT SERPL-MCNC: 0.98 MG/DL (ref 0.76–1.27)
DEPRECATED RDW RBC AUTO: 47.4 FL (ref 37–54)
EGFRCR SERPLBLD CKD-EPI 2021: 91.1 ML/MIN/1.73
EOSINOPHIL # BLD AUTO: 0.59 10*3/MM3 (ref 0–0.4)
EOSINOPHIL NFR BLD AUTO: 8.9 % (ref 0.3–6.2)
ERYTHROCYTE [DISTWIDTH] IN BLOOD BY AUTOMATED COUNT: 15.1 % (ref 12.3–15.4)
GLOBULIN UR ELPH-MCNC: 2.7 GM/DL
GLUCOSE SERPL-MCNC: 91 MG/DL (ref 65–99)
GLUCOSE UR STRIP-MCNC: NEGATIVE MG/DL
HCT VFR BLD AUTO: 46.9 % (ref 37.5–51)
HGB BLD-MCNC: 15.5 G/DL (ref 13–17.7)
HGB UR QL STRIP.AUTO: NEGATIVE
HOLD SPECIMEN: NORMAL
HOLD SPECIMEN: NORMAL
IMM GRANULOCYTES # BLD AUTO: 0.02 10*3/MM3 (ref 0–0.05)
IMM GRANULOCYTES NFR BLD AUTO: 0.3 % (ref 0–0.5)
KETONES UR QL STRIP: NEGATIVE
LEUKOCYTE ESTERASE UR QL STRIP.AUTO: NEGATIVE
LIPASE SERPL-CCNC: 87 U/L (ref 13–60)
LYMPHOCYTES # BLD AUTO: 1.84 10*3/MM3 (ref 0.7–3.1)
LYMPHOCYTES NFR BLD AUTO: 27.7 % (ref 19.6–45.3)
MCH RBC QN AUTO: 28.3 PG (ref 26.6–33)
MCHC RBC AUTO-ENTMCNC: 33 G/DL (ref 31.5–35.7)
MCV RBC AUTO: 85.7 FL (ref 79–97)
MONOCYTES # BLD AUTO: 0.6 10*3/MM3 (ref 0.1–0.9)
MONOCYTES NFR BLD AUTO: 9 % (ref 5–12)
NEUTROPHILS NFR BLD AUTO: 3.56 10*3/MM3 (ref 1.7–7)
NEUTROPHILS NFR BLD AUTO: 53.6 % (ref 42.7–76)
NITRITE UR QL STRIP: NEGATIVE
NRBC BLD AUTO-RTO: 0 /100 WBC (ref 0–0.2)
PH UR STRIP.AUTO: 8 [PH] (ref 5–8)
PLATELET # BLD AUTO: 241 10*3/MM3 (ref 140–450)
PMV BLD AUTO: 9.4 FL (ref 6–12)
POTASSIUM SERPL-SCNC: 4 MMOL/L (ref 3.5–5.2)
PROT SERPL-MCNC: 7.4 G/DL (ref 6–8.5)
PROT UR QL STRIP: NEGATIVE
QT INTERVAL: 409 MS
RBC # BLD AUTO: 5.47 10*6/MM3 (ref 4.14–5.8)
SODIUM SERPL-SCNC: 134 MMOL/L (ref 136–145)
SP GR UR STRIP: 1.02 (ref 1–1.03)
TROPONIN T SERPL-MCNC: <0.01 NG/ML (ref 0–0.03)
UROBILINOGEN UR QL STRIP: NORMAL
WBC NRBC COR # BLD: 6.64 10*3/MM3 (ref 3.4–10.8)
WHOLE BLOOD HOLD COAG: NORMAL
WHOLE BLOOD HOLD SPECIMEN: NORMAL

## 2022-12-02 PROCEDURE — 93005 ELECTROCARDIOGRAM TRACING: CPT

## 2022-12-02 PROCEDURE — 96361 HYDRATE IV INFUSION ADD-ON: CPT

## 2022-12-02 PROCEDURE — 80053 COMPREHEN METABOLIC PANEL: CPT

## 2022-12-02 PROCEDURE — 93010 ELECTROCARDIOGRAM REPORT: CPT | Performed by: INTERNAL MEDICINE

## 2022-12-02 PROCEDURE — 71046 X-RAY EXAM CHEST 2 VIEWS: CPT

## 2022-12-02 PROCEDURE — 81003 URINALYSIS AUTO W/O SCOPE: CPT

## 2022-12-02 PROCEDURE — 96374 THER/PROPH/DIAG INJ IV PUSH: CPT

## 2022-12-02 PROCEDURE — 85025 COMPLETE CBC W/AUTO DIFF WBC: CPT

## 2022-12-02 PROCEDURE — 84484 ASSAY OF TROPONIN QUANT: CPT

## 2022-12-02 PROCEDURE — 74177 CT ABD & PELVIS W/CONTRAST: CPT

## 2022-12-02 PROCEDURE — 25010000002 IOPAMIDOL 61 % SOLUTION: Performed by: EMERGENCY MEDICINE

## 2022-12-02 PROCEDURE — 36415 COLL VENOUS BLD VENIPUNCTURE: CPT

## 2022-12-02 PROCEDURE — 83690 ASSAY OF LIPASE: CPT

## 2022-12-02 PROCEDURE — 99283 EMERGENCY DEPT VISIT LOW MDM: CPT

## 2022-12-02 RX ORDER — SODIUM CHLORIDE 0.9 % (FLUSH) 0.9 %
10 SYRINGE (ML) INJECTION AS NEEDED
Status: DISCONTINUED | OUTPATIENT
Start: 2022-12-02 | End: 2022-12-02 | Stop reason: HOSPADM

## 2022-12-02 RX ORDER — FAMOTIDINE 10 MG/ML
20 INJECTION, SOLUTION INTRAVENOUS ONCE
Status: COMPLETED | OUTPATIENT
Start: 2022-12-02 | End: 2022-12-02

## 2022-12-02 RX ORDER — LIDOCAINE HYDROCHLORIDE 20 MG/ML
15 SOLUTION OROPHARYNGEAL ONCE
Status: COMPLETED | OUTPATIENT
Start: 2022-12-02 | End: 2022-12-02

## 2022-12-02 RX ORDER — ALUMINA, MAGNESIA, AND SIMETHICONE 2400; 2400; 240 MG/30ML; MG/30ML; MG/30ML
15 SUSPENSION ORAL ONCE
Status: COMPLETED | OUTPATIENT
Start: 2022-12-02 | End: 2022-12-02

## 2022-12-02 RX ORDER — PANTOPRAZOLE SODIUM 40 MG/1
40 TABLET, DELAYED RELEASE ORAL DAILY
Qty: 60 TABLET | Refills: 0 | Status: SHIPPED | OUTPATIENT
Start: 2022-12-02 | End: 2022-12-06 | Stop reason: SDUPTHER

## 2022-12-02 RX ORDER — SUCRALFATE 1 G/1
1 TABLET ORAL 4 TIMES DAILY
Qty: 20 TABLET | Refills: 0 | Status: SHIPPED | OUTPATIENT
Start: 2022-12-02 | End: 2022-12-06 | Stop reason: SDUPTHER

## 2022-12-02 RX ADMIN — IOPAMIDOL 85 ML: 612 INJECTION, SOLUTION INTRAVENOUS at 14:15

## 2022-12-02 RX ADMIN — SODIUM CHLORIDE 1000 ML: 9 INJECTION, SOLUTION INTRAVENOUS at 13:26

## 2022-12-02 RX ADMIN — LIDOCAINE HYDROCHLORIDE 15 ML: 20 SOLUTION ORAL; TOPICAL at 13:19

## 2022-12-02 RX ADMIN — ALUMINUM HYDROXIDE, MAGNESIUM HYDROXIDE, AND DIMETHICONE 15 ML: 400; 400; 40 SUSPENSION ORAL at 13:19

## 2022-12-02 RX ADMIN — FAMOTIDINE 20 MG: 10 INJECTION INTRAVENOUS at 13:27

## 2022-12-02 NOTE — ED PROVIDER NOTES
" EMERGENCY DEPARTMENT ENCOUNTER    Room Number:  04/04  Date of encounter:  12/2/2022  PCP: Akash Rodriguez Jr.,   Historian: Patient  Full history not obtainable due to: None    HPI:  Chief Complaint: Abdominal pain    Context: Flako Coreas is a 57 y.o. male with a PMH significant for anxiety, hypertension, diverticulosis, chronic bilateral lower abdominal pain, Lepe's esophagus, GERD who presents to the ED c/o aching left lower quadrant abdominal pain which is nonradiating over the past couple of weeks.  Symptoms have been waxing and waning in intensity without ever completely resolving.  He states that at times his symptoms become sharp in nature.  No obvious alleviating factors at home.  He also has \"esophageal pain\" over the course of that time and related to previous GERD and esophagitis related problems.  He does not currently take an antacid.  He has had nausea with a couple of episodes of vomiting over the past couple of days and some loose stools.  He admits to a fever with an unknown T-max.      MEDICAL RECORD REVIEW:    Upon review of the medical record it appears the patient's most recent evaluation was in the emergency room on 6/29/2022 for non-intractable headache      PAST MEDICAL HISTORY    Active Ambulatory Problems     Diagnosis Date Noted   • Mixed anxiety depressive disorder 12/11/2012   • Mixed hyperlipidemia 06/26/2017   • Essential hypertension 06/26/2017   • Diverticulosis 07/27/2018   • Nodule of spleen 06/27/2018   • Chronic bilateral lower abdominal pain 08/10/2018   • Lepe's esophagus without dysplasia 10/10/2018   • GERD (gastroesophageal reflux disease) 09/18/2018     Resolved Ambulatory Problems     Diagnosis Date Noted   • Abdominal pain 06/20/2018     Past Medical History:   Diagnosis Date   • ADHD (attention deficit hyperactivity disorder)    • Anxiety    • Benign prostatic hyperplasia Surgery in 12/2021   • Clotting disorder (HCC) Intestinal   • Depression    • " Diverticulitis    • Duodenitis    • Enteritis    • Failure to thrive (child)    • Family history of blood clots    • Hyperlipidemia    • Hypertension    • Hypertensive emergency    • Low testosterone    • Microcytosis    • Pancreatitis    • Pneumonia    • PONV (postoperative nausea and vomiting)    • Seasonal affective disorder (HCC)    • Shoulder injury    • Unexplained weight loss          PAST SURGICAL HISTORY  Past Surgical History:   Procedure Laterality Date   • COLON SURGERY     • COLONOSCOPY     • FRACTURE SURGERY  1980    for broken arm   • FRACTURE SURGERY      for broken hand   • INCISION AND DRAINAGE ABSCESS  2015    anal   • PROSTATE SURGERY     • SMALL INTESTINE SURGERY     • TONSILLECTOMY           FAMILY HISTORY  Family History   Problem Relation Age of Onset   • Stroke Mother    • Stroke Father          SOCIAL HISTORY  Social History     Socioeconomic History   • Marital status: Single   Tobacco Use   • Smoking status: Never   • Smokeless tobacco: Never   Vaping Use   • Vaping Use: Never used   Substance and Sexual Activity   • Alcohol use: Yes     Alcohol/week: 20.0 standard drinks     Types: 10 Cans of beer, 10 Shots of liquor per week     Comment: pt states very rarely   • Drug use: No     Comment: Pt states he may have smoked marijuana 3 weeks ago (stated on 11-07-18)   • Sexual activity: Not Currently     Partners: Female         ALLERGIES  Patient has no known allergies.        REVIEW OF SYSTEMS    All systems reviewed and marked as negative except as listed in HPI     PHYSICAL EXAM    I have reviewed the triage vital signs and nursing notes.    ED Triage Vitals [12/02/22 1057]   Temp Heart Rate Resp BP SpO2   97.7 °F (36.5 °C) 87 18 -- 99 %      Temp src Heart Rate Source Patient Position BP Location FiO2 (%)   Tympanic -- -- -- --       Physical Exam  Constitutional:       General: He is not in acute distress.     Appearance: He is well-developed.   HENT:      Head: Normocephalic and  atraumatic.   Eyes:      General: No scleral icterus.     Conjunctiva/sclera: Conjunctivae normal.   Neck:      Trachea: No tracheal deviation.   Cardiovascular:      Rate and Rhythm: Normal rate and regular rhythm.   Pulmonary:      Effort: Pulmonary effort is normal.      Breath sounds: Normal breath sounds.   Abdominal:      Palpations: Abdomen is soft.      Tenderness: There is abdominal tenderness in the epigastric area and left lower quadrant. There is no guarding.   Musculoskeletal:         General: No deformity.      Cervical back: Normal range of motion.   Lymphadenopathy:      Cervical: No cervical adenopathy.   Skin:     General: Skin is warm and dry.   Neurological:      Mental Status: He is alert and oriented to person, place, and time.   Psychiatric:         Behavior: Behavior normal.         Vital signs and nursing notes reviewed.            LAB RESULTS  Recent Results (from the past 24 hour(s))   ECG 12 Lead ED Triage Standing Order; Abdominal Pain (Upper)    Collection Time: 12/02/22 11:05 AM   Result Value Ref Range    QT Interval 409 ms   Comprehensive Metabolic Panel    Collection Time: 12/02/22 11:24 AM    Specimen: Blood   Result Value Ref Range    Glucose 91 65 - 99 mg/dL    BUN 9 6 - 20 mg/dL    Creatinine 0.98 0.76 - 1.27 mg/dL    Sodium 134 (L) 136 - 145 mmol/L    Potassium 4.0 3.5 - 5.2 mmol/L    Chloride 96 (L) 98 - 107 mmol/L    CO2 27.3 22.0 - 29.0 mmol/L    Calcium 9.3 8.6 - 10.5 mg/dL    Total Protein 7.4 6.0 - 8.5 g/dL    Albumin 4.70 3.50 - 5.20 g/dL    ALT (SGPT) 26 1 - 41 U/L    AST (SGOT) 18 1 - 40 U/L    Alkaline Phosphatase 86 39 - 117 U/L    Total Bilirubin 0.8 0.0 - 1.2 mg/dL    Globulin 2.7 gm/dL    A/G Ratio 1.7 g/dL    BUN/Creatinine Ratio 9.2 7.0 - 25.0    Anion Gap 10.7 5.0 - 15.0 mmol/L    eGFR 91.1 >60.0 mL/min/1.73   Lipase    Collection Time: 12/02/22 11:24 AM    Specimen: Blood   Result Value Ref Range    Lipase 87 (H) 13 - 60 U/L   Troponin    Collection Time:  12/02/22 11:24 AM    Specimen: Blood   Result Value Ref Range    Troponin T <0.010 0.000 - 0.030 ng/mL   Urinalysis With Microscopic If Indicated (No Culture) - Urine, Clean Catch    Collection Time: 12/02/22 11:24 AM    Specimen: Urine, Clean Catch   Result Value Ref Range    Color, UA Yellow Yellow, Straw    Appearance, UA Clear Clear    pH, UA 8.0 5.0 - 8.0    Specific Gravity, UA 1.016 1.005 - 1.030    Glucose, UA Negative Negative    Ketones, UA Negative Negative    Bilirubin, UA Negative Negative    Blood, UA Negative Negative    Protein, UA Negative Negative    Leuk Esterase, UA Negative Negative    Nitrite, UA Negative Negative    Urobilinogen, UA 0.2 E.U./dL 0.2 - 1.0 E.U./dL   Green Top (Gel)    Collection Time: 12/02/22 11:24 AM   Result Value Ref Range    Extra Tube Hold for add-ons.    Lavender Top    Collection Time: 12/02/22 11:24 AM   Result Value Ref Range    Extra Tube hold for add-on    Gold Top - SST    Collection Time: 12/02/22 11:24 AM   Result Value Ref Range    Extra Tube Hold for add-ons.    Light Blue Top    Collection Time: 12/02/22 11:24 AM   Result Value Ref Range    Extra Tube Hold for add-ons.    CBC Auto Differential    Collection Time: 12/02/22 11:24 AM    Specimen: Blood   Result Value Ref Range    WBC 6.64 3.40 - 10.80 10*3/mm3    RBC 5.47 4.14 - 5.80 10*6/mm3    Hemoglobin 15.5 13.0 - 17.7 g/dL    Hematocrit 46.9 37.5 - 51.0 %    MCV 85.7 79.0 - 97.0 fL    MCH 28.3 26.6 - 33.0 pg    MCHC 33.0 31.5 - 35.7 g/dL    RDW 15.1 12.3 - 15.4 %    RDW-SD 47.4 37.0 - 54.0 fl    MPV 9.4 6.0 - 12.0 fL    Platelets 241 140 - 450 10*3/mm3    Neutrophil % 53.6 42.7 - 76.0 %    Lymphocyte % 27.7 19.6 - 45.3 %    Monocyte % 9.0 5.0 - 12.0 %    Eosinophil % 8.9 (H) 0.3 - 6.2 %    Basophil % 0.5 0.0 - 1.5 %    Immature Grans % 0.3 0.0 - 0.5 %    Neutrophils, Absolute 3.56 1.70 - 7.00 10*3/mm3    Lymphocytes, Absolute 1.84 0.70 - 3.10 10*3/mm3    Monocytes, Absolute 0.60 0.10 - 0.90 10*3/mm3     Eosinophils, Absolute 0.59 (H) 0.00 - 0.40 10*3/mm3    Basophils, Absolute 0.03 0.00 - 0.20 10*3/mm3    Immature Grans, Absolute 0.02 0.00 - 0.05 10*3/mm3    nRBC 0.0 0.0 - 0.2 /100 WBC       Ordered the above labs and independently reviewed the results.        RADIOLOGY  XR Chest 2 View    Result Date: 12/2/2022  XR CHEST 2 VW-  12/02/2022  HISTORY: Upper abdominal pain.  Heart size is within normal limits. Lungs appear free of acute infiltrates. There are minor degenerative changes in the spine. Bones and soft tissues are otherwise unremarkable.      1. No acute process.  This report was finalized on 12/2/2022 11:51 AM by Dr. Salvador Arita M.D.        I ordered the above noted radiological studies. Independently reviewed by me and discussed with radiologist.  See dictation above for official radiology interpretation.      PROCEDURES    Procedures        MEDICATIONS GIVEN IN ER    Medications   sodium chloride 0.9 % flush 10 mL (has no administration in time range)   aluminum-magnesium hydroxide-simethicone (MAALOX MAX) 400-400-40 MG/5ML suspension 15 mL (15 mL Oral Given 12/2/22 1319)   Lidocaine Viscous HCl (XYLOCAINE) 2 % solution 15 mL (15 mL Mouth/Throat Given 12/2/22 1319)   famotidine (PEPCID) injection 20 mg (20 mg Intravenous Given 12/2/22 1327)   sodium chloride 0.9 % bolus 1,000 mL (1,000 mL Intravenous New Bag 12/2/22 1326)   iopamidol (ISOVUE-300) 61 % injection 100 mL (85 mL Intravenous Given by Other 12/2/22 1415)         PROGRESS, DATA ANALYSIS, CONSULTS, AND MEDICAL DECISION MAKING    All labs have been independently reviewed by me.  All radiology studies have been reviewed by me.   EKG's independently reviewed by me.  Discussion below represents my analysis of pertinent findings related to patient's condition, differential diagnosis, treatment plan and final disposition.    DIFFERENTIAL DIAGNOSIS INCLUDE BUT NOT LIMITED TO:     Differential diagnosis includes but is not limited to:  -  Hepatobiliary pathology such as cholecystitis, cholangitis, and symptomatic cholelithiasis  - Pancreatitis  - Dyspepsia  - Small bowel obstruction  - Appendicitis  - Diverticulitis  - UTI including pyelonephritis  - Ureteral stone  - Zoster  - Colitis, including infectious and ischemic  - Atypical ACS    ED Course as of 12/02/22 1452   Fri Dec 02, 2022   1252 I independently viewed CXR on PACS system.  My interpretation is no pneumothorax. [DC]      ED Course User Index  [DC] Khurram Aly PA       AS OF 14:52 EST VITALS:    BP - (!) 197/124  HR - 57  TEMP - 97.7 °F (36.5 °C) (Tympanic)  02 SATS - 96%    1453 I rechecked the patient. Symptoms are better controlled and he tolerates PO intake.  I discussed the patient's labs, radiology findings (including all incidental findings), diagnosis, and plan for discharge.  A repeat exam reveals no new worrisome changes from my initial exam findings.  The patient understands that the fact that they are being discharged does not denote that nothing is abnormal, it indicates that no clinical emergency is present and that they must follow-up as directed in order to properly maintain their health.  Follow-up instructions (specifically listed below) and return to ER precautions were given at this time.  I specifically instructed the patient to follow-up with their PCP.  The patient understands and agrees with the plan, and is ready for discharge.  All questions answered.      DIAGNOSIS  Final diagnoses:   Acute gastritis without hemorrhage, unspecified gastritis type   LLQ pain         DISPOSITION  D/c    Pt masked in first look. I wore a surgical mask throughout my encounters with the pt. I performed hand hygiene on entry into the pt room and upon exit.     Dictated utilizing Dragon dictation     Note Disclaimer: At [facility], we believe that sharing information builds trust and better relationships. You are receiving this note because you recently visited [facility] [place]  [facility]. It is possible you will see health information before a provider has talked with you about it. This kind of information can be easy to misunderstand. To help you fully understand what it means for your health, we urge you to discuss this note with your provider.      Khurram Aly PA  12/02/22 1459

## 2022-12-02 NOTE — ED PROVIDER NOTES
MD ATTESTATION NOTE    The DON and I have discussed this patient's history, physical exam, and treatment plan.  I have reviewed the documentation and personally had a face to face interaction with the patient. I affirm the documentation and agree with the treatment and plan.  The attached note describes my personal findings.      I provided a substantive portion of the care of the patient.  I personally performed the physical exam in its entirety, and below are my findings.  For this patient encounter, the patient wore surgical mask, I wore full protective PPE including N95 and eye protection.      Brief HPI: Patient presents for evaluation of persistent abdominal pain that has been present now for several days but recently seems to be radiating up and towards the right side of the chest.  Patient tells me he has had this kind of problem in the chest before which was attributed to esophageal issues in the past.  He has had some nausea with vomiting a couple of episodes and also some loose stools.  He remarks about seeing some blood in the stools but believes that they might be due to some hemorrhoid problems.    PHYSICAL EXAM  ED Triage Vitals   Temp Heart Rate Resp BP SpO2   12/02/22 1057 12/02/22 1057 12/02/22 1057 12/02/22 1321 12/02/22 1057   97.7 °F (36.5 °C) 87 18 (!) 185/126 99 %      Temp src Heart Rate Source Patient Position BP Location FiO2 (%)   12/02/22 1057 -- -- -- --   Tympanic             GENERAL: Pleasant gentleman, resting calmly in the bed, appears uncomfortable but no acute distress  HENT: nares patent, normocephalic and atraumatic  EYES: no scleral icterus, EOMI  CV: regular rhythm, normal rate, normal pulses, no murmurs  RESPIRATORY: normal effort, lungs clear to auscultation bilaterally  ABDOMEN: soft, mild tenderness throughout the bilateral upper quadrants and central abdomen.  No obvious peritoneal features.  MUSCULOSKELETAL: no deformity, no asymmetry or edema  NEURO: alert, moves all  extremities, follows commands, no focal deficits  PSYCH:  calm, cooperative  SKIN: warm, dry    Vital signs and nursing notes reviewed.        Plan: Initiating typical work-up for abdominal pain primarily.  Including CT scan of the abdomen.  Also evaluating for chest pain component with EKG and chest x-ray and troponin.  Low suspicion for ACS or PE given his overall presentation and seemingly more focused complaint with abdominal discomfort and chest pain.     Juan Ramon Flowers MD  12/02/22 [id number]

## 2022-12-06 ENCOUNTER — OFFICE VISIT (OUTPATIENT)
Dept: SPORTS MEDICINE | Facility: CLINIC | Age: 57
End: 2022-12-06

## 2022-12-06 VITALS
SYSTOLIC BLOOD PRESSURE: 150 MMHG | BODY MASS INDEX: 28.23 KG/M2 | HEIGHT: 74 IN | TEMPERATURE: 97.8 F | WEIGHT: 220 LBS | HEART RATE: 74 BPM | DIASTOLIC BLOOD PRESSURE: 100 MMHG | RESPIRATION RATE: 16 BRPM | OXYGEN SATURATION: 99 %

## 2022-12-06 DIAGNOSIS — I10 ESSENTIAL HYPERTENSION: ICD-10-CM

## 2022-12-06 DIAGNOSIS — K22.70 BARRETT'S ESOPHAGUS WITHOUT DYSPLASIA: ICD-10-CM

## 2022-12-06 DIAGNOSIS — K29.00 ACUTE GASTRITIS WITHOUT HEMORRHAGE, UNSPECIFIED GASTRITIS TYPE: Primary | ICD-10-CM

## 2022-12-06 PROCEDURE — 99214 OFFICE O/P EST MOD 30 MIN: CPT | Performed by: FAMILY MEDICINE

## 2022-12-06 RX ORDER — ONDANSETRON 4 MG/1
4 TABLET, FILM COATED ORAL 4 TIMES DAILY PRN
Qty: 30 TABLET | Refills: 0 | Status: SHIPPED | OUTPATIENT
Start: 2022-12-06 | End: 2022-12-28

## 2022-12-06 RX ORDER — PANTOPRAZOLE SODIUM 40 MG/1
40 TABLET, DELAYED RELEASE ORAL DAILY
Qty: 60 TABLET | Refills: 0 | Status: SHIPPED | OUTPATIENT
Start: 2022-12-06 | End: 2022-12-12 | Stop reason: HOSPADM

## 2022-12-06 RX ORDER — SUCRALFATE 1 G/1
1 TABLET ORAL 4 TIMES DAILY
Qty: 20 TABLET | Refills: 0 | Status: SHIPPED | OUTPATIENT
Start: 2022-12-06

## 2022-12-06 NOTE — PROGRESS NOTES
"Flako is a 57 y.o. year old male presents to [facility]    Chief Complaint   Patient presents with   • Hospital Follow Up Visit     ER f/u eval for acute gastritis and abdominal pain, seen in ED on 12/02/2022 at [facility] - here for further evaluation and treatment        History of Present Illness  Similar sxs as before. Onset 1 wk prior to ER visit. Symptoms are as before, upper GI. Burning sensation in throat. Has not picked up scripts from ER visit - Carafate, Protonix. Also having bloody BMs which he has had before.    ED with Juan Ramon Flowers MD (12/02/2022)      I have reviewed the patient's medical, family, and social history in detail and updated the computerized patient record.    /100 (BP Location: Left arm, Patient Position: Sitting, Cuff Size: Adult)   Pulse 74   Temp 97.8 °F (36.6 °C) (Temporal)   Resp 16   Ht 188 cm (74.02\")   Wt 99.8 kg (220 lb)   SpO2 99%   BMI 28.23 kg/m²      Physical Exam    Mask worn thru encounter  Vital signs reviewed.   General: No acute distress though he is in pain  Eyes: conjunctiva clear; pupils equally round and reactive  ENT: external ears atraumatic  CV: no peripheral edema; S1, S2 no murmurs rubs or gallops  Resp: normal respiratory effort, no use of accessory muscles; CTA B/L  GI: BS x4.  Soft, nontender.  Skin: no rashes or wounds; normal turgor  Psych: mood and affect appropriate; recent and remote memory intact  Neuro: sensation to light touch intact      CT Abdomen Pelvis With Contrast (12/02/2022 14:14)               Diagnoses and all orders for this visit:    Acute gastritis without hemorrhage, unspecified gastritis type  -     pantoprazole (PROTONIX) 40 MG EC tablet; Take 1 tablet by mouth Daily.  -     sucralfate (CARAFATE) 1 g tablet; Take 1 tablet by mouth 4 (Four) Times a Day.  -     ondansetron (Zofran) 4 MG tablet; Take 1 tablet by mouth 4 (Four) Times a Day As Needed for Nausea or Vomiting.    Lepe's esophagus " without dysplasia  -     pantoprazole (PROTONIX) 40 MG EC tablet; Take 1 tablet by mouth Daily.  -     sucralfate (CARAFATE) 1 g tablet; Take 1 tablet by mouth 4 (Four) Times a Day.  -     ondansetron (Zofran) 4 MG tablet; Take 1 tablet by mouth 4 (Four) Times a Day As Needed for Nausea or Vomiting.    Essential hypertension    History of same.  Acute exacerbation.  Unknown exact trigger.  Reviewed previous records.  Refill sent as patient unknown where his previous prescriptions for Protonix, Carafate were sent.  I recommend to initiate and take as written on prescription.  Follow-up in 3 days.  Consider urgent referral to GI for scope if pain does not improve.      Follow Up   Return in about 3 days (around 12/9/2022) for Recheck.  Patient was given instructions and counseling regarding his condition or for health maintenance advice. Please see specific information pulled into the AVS if appropriate.     EMR Dragon/Transcription disclaimer:    Much of this encounter note is an electronic transcription/translation of spoken language to printed text.  The electronic translation of spoken language may permit erroneous, or at times, nonsensical words or phrases to be inadvertently transcribed.  Although I have reviewed the note for such errors some may still exist.

## 2022-12-08 ENCOUNTER — APPOINTMENT (OUTPATIENT)
Dept: CT IMAGING | Facility: HOSPITAL | Age: 57
End: 2022-12-08

## 2022-12-08 ENCOUNTER — HOSPITAL ENCOUNTER (INPATIENT)
Facility: HOSPITAL | Age: 57
LOS: 4 days | Discharge: HOME | End: 2022-12-12
Attending: EMERGENCY MEDICINE | Admitting: HOSPITALIST

## 2022-12-08 DIAGNOSIS — K22.70 BARRETT'S ESOPHAGUS WITHOUT DYSPLASIA: ICD-10-CM

## 2022-12-08 DIAGNOSIS — R10.84 GENERALIZED ABDOMINAL PAIN: Primary | ICD-10-CM

## 2022-12-08 DIAGNOSIS — I10 HYPERTENSION, UNSPECIFIED TYPE: ICD-10-CM

## 2022-12-08 DIAGNOSIS — K92.2 GASTROINTESTINAL HEMORRHAGE, UNSPECIFIED GASTROINTESTINAL HEMORRHAGE TYPE: ICD-10-CM

## 2022-12-08 DIAGNOSIS — Z87.19 HISTORY OF ESOPHAGITIS: ICD-10-CM

## 2022-12-08 DIAGNOSIS — R74.8 ELEVATED LIPASE: ICD-10-CM

## 2022-12-08 DIAGNOSIS — D64.9 ANEMIA, UNSPECIFIED TYPE: ICD-10-CM

## 2022-12-08 LAB
ALBUMIN SERPL-MCNC: 4.2 G/DL (ref 3.5–5.2)
ALBUMIN/GLOB SERPL: 1.7 G/DL
ALP SERPL-CCNC: 74 U/L (ref 39–117)
ALT SERPL W P-5'-P-CCNC: 20 U/L (ref 1–41)
ANION GAP SERPL CALCULATED.3IONS-SCNC: 11 MMOL/L (ref 5–15)
AST SERPL-CCNC: 17 U/L (ref 1–40)
BASOPHILS # BLD AUTO: 0.04 10*3/MM3 (ref 0–0.2)
BASOPHILS NFR BLD AUTO: 0.6 % (ref 0–1.5)
BILIRUB SERPL-MCNC: 0.5 MG/DL (ref 0–1.2)
BILIRUB UR QL STRIP: NEGATIVE
BUN SERPL-MCNC: 12 MG/DL (ref 6–20)
BUN/CREAT SERPL: 14.3 (ref 7–25)
CALCIUM SPEC-SCNC: 8.8 MG/DL (ref 8.6–10.5)
CHLORIDE SERPL-SCNC: 101 MMOL/L (ref 98–107)
CLARITY UR: ABNORMAL
CO2 SERPL-SCNC: 26 MMOL/L (ref 22–29)
COLOR UR: YELLOW
CREAT SERPL-MCNC: 0.84 MG/DL (ref 0.76–1.27)
DEPRECATED RDW RBC AUTO: 44.9 FL (ref 37–54)
EGFRCR SERPLBLD CKD-EPI 2021: 103 ML/MIN/1.73
EOSINOPHIL # BLD AUTO: 0.55 10*3/MM3 (ref 0–0.4)
EOSINOPHIL NFR BLD AUTO: 8.8 % (ref 0.3–6.2)
ERYTHROCYTE [DISTWIDTH] IN BLOOD BY AUTOMATED COUNT: 14.7 % (ref 12.3–15.4)
FLUAV SUBTYP SPEC NAA+PROBE: NOT DETECTED
FLUBV RNA ISLT QL NAA+PROBE: NOT DETECTED
GLOBULIN UR ELPH-MCNC: 2.5 GM/DL
GLUCOSE SERPL-MCNC: 110 MG/DL (ref 65–99)
GLUCOSE UR STRIP-MCNC: NEGATIVE MG/DL
HCT VFR BLD AUTO: 35.4 % (ref 37.5–51)
HCT VFR BLD AUTO: 42.9 % (ref 37.5–51)
HGB BLD-MCNC: 12.1 G/DL (ref 13–17.7)
HGB BLD-MCNC: 14.4 G/DL (ref 13–17.7)
HGB UR QL STRIP.AUTO: NEGATIVE
IMM GRANULOCYTES # BLD AUTO: 0.02 10*3/MM3 (ref 0–0.05)
IMM GRANULOCYTES NFR BLD AUTO: 0.3 % (ref 0–0.5)
KETONES UR QL STRIP: NEGATIVE
LEUKOCYTE ESTERASE UR QL STRIP.AUTO: NEGATIVE
LIPASE SERPL-CCNC: 220 U/L (ref 13–60)
LYMPHOCYTES # BLD AUTO: 1.39 10*3/MM3 (ref 0.7–3.1)
LYMPHOCYTES NFR BLD AUTO: 22.2 % (ref 19.6–45.3)
MCH RBC QN AUTO: 28.5 PG (ref 26.6–33)
MCHC RBC AUTO-ENTMCNC: 34.2 G/DL (ref 31.5–35.7)
MCV RBC AUTO: 83.5 FL (ref 79–97)
MONOCYTES # BLD AUTO: 0.5 10*3/MM3 (ref 0.1–0.9)
MONOCYTES NFR BLD AUTO: 8 % (ref 5–12)
NEUTROPHILS NFR BLD AUTO: 3.77 10*3/MM3 (ref 1.7–7)
NEUTROPHILS NFR BLD AUTO: 60.1 % (ref 42.7–76)
NITRITE UR QL STRIP: NEGATIVE
NRBC BLD AUTO-RTO: 0.2 /100 WBC (ref 0–0.2)
PH UR STRIP.AUTO: 7 [PH] (ref 5–8)
PLATELET # BLD AUTO: 186 10*3/MM3 (ref 140–450)
PMV BLD AUTO: 9.8 FL (ref 6–12)
POTASSIUM SERPL-SCNC: 3.6 MMOL/L (ref 3.5–5.2)
PROT SERPL-MCNC: 6.7 G/DL (ref 6–8.5)
PROT UR QL STRIP: NEGATIVE
QT INTERVAL: 448 MS
RBC # BLD AUTO: 4.24 10*6/MM3 (ref 4.14–5.8)
SARS-COV-2 RNA PNL SPEC NAA+PROBE: NOT DETECTED
SODIUM SERPL-SCNC: 138 MMOL/L (ref 136–145)
SP GR UR STRIP: 1.01 (ref 1–1.03)
TROPONIN T SERPL-MCNC: <0.01 NG/ML (ref 0–0.03)
UROBILINOGEN UR QL STRIP: ABNORMAL
WBC NRBC COR # BLD: 6.27 10*3/MM3 (ref 3.4–10.8)

## 2022-12-08 PROCEDURE — 99285 EMERGENCY DEPT VISIT HI MDM: CPT

## 2022-12-08 PROCEDURE — 25010000002 HYDROMORPHONE PER 4 MG: Performed by: EMERGENCY MEDICINE

## 2022-12-08 PROCEDURE — 36415 COLL VENOUS BLD VENIPUNCTURE: CPT | Performed by: NURSE PRACTITIONER

## 2022-12-08 PROCEDURE — 25010000002 ONDANSETRON PER 1 MG: Performed by: NURSE PRACTITIONER

## 2022-12-08 PROCEDURE — 85025 COMPLETE CBC W/AUTO DIFF WBC: CPT | Performed by: EMERGENCY MEDICINE

## 2022-12-08 PROCEDURE — 25010000002 HYDROMORPHONE PER 4 MG: Performed by: NURSE PRACTITIONER

## 2022-12-08 PROCEDURE — 93010 ELECTROCARDIOGRAM REPORT: CPT | Performed by: STUDENT IN AN ORGANIZED HEALTH CARE EDUCATION/TRAINING PROGRAM

## 2022-12-08 PROCEDURE — 85014 HEMATOCRIT: CPT | Performed by: NURSE PRACTITIONER

## 2022-12-08 PROCEDURE — 81003 URINALYSIS AUTO W/O SCOPE: CPT | Performed by: EMERGENCY MEDICINE

## 2022-12-08 PROCEDURE — 87636 SARSCOV2 & INF A&B AMP PRB: CPT | Performed by: EMERGENCY MEDICINE

## 2022-12-08 PROCEDURE — 25010000002 ONDANSETRON PER 1 MG: Performed by: EMERGENCY MEDICINE

## 2022-12-08 PROCEDURE — 71275 CT ANGIOGRAPHY CHEST: CPT

## 2022-12-08 PROCEDURE — 0 IOPAMIDOL PER 1 ML: Performed by: EMERGENCY MEDICINE

## 2022-12-08 PROCEDURE — 80053 COMPREHEN METABOLIC PANEL: CPT | Performed by: EMERGENCY MEDICINE

## 2022-12-08 PROCEDURE — 70450 CT HEAD/BRAIN W/O DYE: CPT

## 2022-12-08 PROCEDURE — 84484 ASSAY OF TROPONIN QUANT: CPT | Performed by: EMERGENCY MEDICINE

## 2022-12-08 PROCEDURE — 85018 HEMOGLOBIN: CPT | Performed by: NURSE PRACTITIONER

## 2022-12-08 PROCEDURE — 93005 ELECTROCARDIOGRAM TRACING: CPT | Performed by: EMERGENCY MEDICINE

## 2022-12-08 PROCEDURE — 83690 ASSAY OF LIPASE: CPT | Performed by: EMERGENCY MEDICINE

## 2022-12-08 PROCEDURE — 25010000002 HYDROMORPHONE 1 MG/ML SOLUTION: Performed by: EMERGENCY MEDICINE

## 2022-12-08 RX ORDER — ACETAMINOPHEN 160 MG/5ML
650 SOLUTION ORAL EVERY 4 HOURS PRN
Status: DISCONTINUED | OUTPATIENT
Start: 2022-12-08 | End: 2022-12-12 | Stop reason: HOSPADM

## 2022-12-08 RX ORDER — LABETALOL HYDROCHLORIDE 5 MG/ML
10 INJECTION, SOLUTION INTRAVENOUS ONCE
Status: COMPLETED | OUTPATIENT
Start: 2022-12-08 | End: 2022-12-08

## 2022-12-08 RX ORDER — SODIUM CHLORIDE 0.9 % (FLUSH) 0.9 %
10 SYRINGE (ML) INJECTION AS NEEDED
Status: DISCONTINUED | OUTPATIENT
Start: 2022-12-08 | End: 2022-12-12 | Stop reason: HOSPADM

## 2022-12-08 RX ORDER — HYDROMORPHONE HYDROCHLORIDE 1 MG/ML
0.5 INJECTION, SOLUTION INTRAMUSCULAR; INTRAVENOUS; SUBCUTANEOUS ONCE
Status: COMPLETED | OUTPATIENT
Start: 2022-12-08 | End: 2022-12-08

## 2022-12-08 RX ORDER — ACETAMINOPHEN 325 MG/1
650 TABLET ORAL EVERY 4 HOURS PRN
Status: DISCONTINUED | OUTPATIENT
Start: 2022-12-08 | End: 2022-12-12 | Stop reason: HOSPADM

## 2022-12-08 RX ORDER — LABETALOL HYDROCHLORIDE 5 MG/ML
INJECTION, SOLUTION INTRAVENOUS
Status: COMPLETED
Start: 2022-12-08 | End: 2022-12-08

## 2022-12-08 RX ORDER — PANTOPRAZOLE SODIUM 40 MG/10ML
80 INJECTION, POWDER, LYOPHILIZED, FOR SOLUTION INTRAVENOUS ONCE
Status: COMPLETED | OUTPATIENT
Start: 2022-12-08 | End: 2022-12-08

## 2022-12-08 RX ORDER — HYDROMORPHONE HYDROCHLORIDE 1 MG/ML
0.5 INJECTION, SOLUTION INTRAMUSCULAR; INTRAVENOUS; SUBCUTANEOUS
Status: DISCONTINUED | OUTPATIENT
Start: 2022-12-08 | End: 2022-12-11

## 2022-12-08 RX ORDER — VILAZODONE HYDROCHLORIDE 40 MG/1
40 TABLET ORAL DAILY
Status: DISCONTINUED | OUTPATIENT
Start: 2022-12-08 | End: 2022-12-12 | Stop reason: HOSPADM

## 2022-12-08 RX ORDER — PANTOPRAZOLE SODIUM 40 MG/10ML
INJECTION, POWDER, LYOPHILIZED, FOR SOLUTION INTRAVENOUS
Status: COMPLETED
Start: 2022-12-08 | End: 2022-12-08

## 2022-12-08 RX ORDER — ONDANSETRON 4 MG/1
4 TABLET, FILM COATED ORAL 4 TIMES DAILY PRN
Status: DISCONTINUED | OUTPATIENT
Start: 2022-12-08 | End: 2022-12-12 | Stop reason: HOSPADM

## 2022-12-08 RX ORDER — AMLODIPINE BESYLATE 10 MG/1
10 TABLET ORAL DAILY
Status: DISCONTINUED | OUTPATIENT
Start: 2022-12-08 | End: 2022-12-12 | Stop reason: HOSPADM

## 2022-12-08 RX ORDER — ROSUVASTATIN CALCIUM 10 MG/1
10 TABLET, COATED ORAL NIGHTLY
Status: DISCONTINUED | OUTPATIENT
Start: 2022-12-08 | End: 2022-12-12 | Stop reason: HOSPADM

## 2022-12-08 RX ORDER — ONDANSETRON 2 MG/ML
4 INJECTION INTRAMUSCULAR; INTRAVENOUS ONCE
Status: COMPLETED | OUTPATIENT
Start: 2022-12-08 | End: 2022-12-08

## 2022-12-08 RX ORDER — LISINOPRIL 20 MG/1
20 TABLET ORAL 2 TIMES DAILY
Status: DISCONTINUED | OUTPATIENT
Start: 2022-12-08 | End: 2022-12-12

## 2022-12-08 RX ORDER — ACETAMINOPHEN 650 MG/1
650 SUPPOSITORY RECTAL EVERY 4 HOURS PRN
Status: DISCONTINUED | OUTPATIENT
Start: 2022-12-08 | End: 2022-12-12 | Stop reason: HOSPADM

## 2022-12-08 RX ORDER — ONDANSETRON 2 MG/ML
4 INJECTION INTRAMUSCULAR; INTRAVENOUS EVERY 6 HOURS PRN
Status: DISCONTINUED | OUTPATIENT
Start: 2022-12-08 | End: 2022-12-12 | Stop reason: HOSPADM

## 2022-12-08 RX ORDER — SUCRALFATE 1 G/1
1 TABLET ORAL
Status: DISCONTINUED | OUTPATIENT
Start: 2022-12-08 | End: 2022-12-12 | Stop reason: HOSPADM

## 2022-12-08 RX ADMIN — SUCRALFATE 1 G: 1 TABLET ORAL at 20:20

## 2022-12-08 RX ADMIN — PANTOPRAZOLE SODIUM 80 MG: 40 INJECTION, POWDER, FOR SOLUTION INTRAVENOUS at 11:12

## 2022-12-08 RX ADMIN — VILAZODONE HYDROCHLORIDE 40 MG: 40 TABLET, FILM COATED ORAL at 20:20

## 2022-12-08 RX ADMIN — HYDROMORPHONE HYDROCHLORIDE 0.5 MG: 1 INJECTION, SOLUTION INTRAMUSCULAR; INTRAVENOUS; SUBCUTANEOUS at 20:20

## 2022-12-08 RX ADMIN — HYDROMORPHONE HYDROCHLORIDE 0.5 MG: 1 INJECTION, SOLUTION INTRAMUSCULAR; INTRAVENOUS; SUBCUTANEOUS at 07:16

## 2022-12-08 RX ADMIN — AMLODIPINE BESYLATE 10 MG: 10 TABLET ORAL at 20:20

## 2022-12-08 RX ADMIN — LISINOPRIL 20 MG: 20 TABLET ORAL at 20:20

## 2022-12-08 RX ADMIN — ONDANSETRON 4 MG: 2 INJECTION INTRAMUSCULAR; INTRAVENOUS at 23:30

## 2022-12-08 RX ADMIN — ROSUVASTATIN CALCIUM 10 MG: 10 TABLET, FILM COATED ORAL at 20:20

## 2022-12-08 RX ADMIN — LABETALOL HYDROCHLORIDE 10 MG: 5 INJECTION, SOLUTION INTRAVENOUS at 11:12

## 2022-12-08 RX ADMIN — PANTOPRAZOLE SODIUM 80 MG: 40 INJECTION, POWDER, LYOPHILIZED, FOR SOLUTION INTRAVENOUS at 11:12

## 2022-12-08 RX ADMIN — PANTOPRAZOLE SODIUM 8 MG/HR: 40 INJECTION, POWDER, FOR SOLUTION INTRAVENOUS at 22:37

## 2022-12-08 RX ADMIN — PANTOPRAZOLE SODIUM 8 MG/HR: 40 INJECTION, POWDER, FOR SOLUTION INTRAVENOUS at 17:32

## 2022-12-08 RX ADMIN — HYDROMORPHONE HYDROCHLORIDE 0.5 MG: 1 INJECTION, SOLUTION INTRAMUSCULAR; INTRAVENOUS; SUBCUTANEOUS at 14:17

## 2022-12-08 RX ADMIN — HYDROMORPHONE HYDROCHLORIDE 1 MG: 1 INJECTION, SOLUTION INTRAMUSCULAR; INTRAVENOUS; SUBCUTANEOUS at 11:07

## 2022-12-08 RX ADMIN — HYDROMORPHONE HYDROCHLORIDE 0.5 MG: 1 INJECTION, SOLUTION INTRAMUSCULAR; INTRAVENOUS; SUBCUTANEOUS at 17:32

## 2022-12-08 RX ADMIN — ONDANSETRON 4 MG: 2 INJECTION INTRAMUSCULAR; INTRAVENOUS at 14:17

## 2022-12-08 RX ADMIN — IOPAMIDOL 95 ML: 755 INJECTION, SOLUTION INTRAVENOUS at 09:05

## 2022-12-08 RX ADMIN — ONDANSETRON 4 MG: 2 INJECTION INTRAMUSCULAR; INTRAVENOUS at 19:03

## 2022-12-08 RX ADMIN — ONDANSETRON 4 MG: 2 INJECTION INTRAMUSCULAR; INTRAVENOUS at 07:16

## 2022-12-08 NOTE — ED NOTES
Pt arrived by car reporting pain in esophagus and abd. Pt reports that he was seen here within the last week and the pain feels the same as the first visit. Pt has been taking medications as prescribed. Pt is alert and oriented as well as ambulatory in triage.

## 2022-12-08 NOTE — ED NOTES
Nursing report ED to floor  Flako Coreas  57 y.o.  male    HPI :   Chief Complaint   Patient presents with    Abdominal Pain       Admitting doctor:   Alden Choudhury MD    Admitting diagnosis:   The primary encounter diagnosis was Generalized abdominal pain. Diagnoses of Gastrointestinal hemorrhage, unspecified gastrointestinal hemorrhage type, Elevated lipase, Anemia, unspecified type, and Hypertension, unspecified type were also pertinent to this visit.    Code status:   Current Code Status       Date Active Code Status Order ID Comments User Context       12/8/2022 1407 CPR (Attempt to Resuscitate) [id number]  Viviane Thakkar, JANEEN ED        Question Answer    Code Status (Patient has no pulse and is not breathing) CPR (Attempt to Resuscitate)    Medical Interventions (Patient has pulse or is breathing) Full Support    Level Of Support Discussed With Patient                    Allergies:   Patient has no known allergies.    Isolation:   No active isolations    Intake and Output  No intake or output data in the 24 hours ending 12/08/22 1422    Weight:       12/08/22  0436   Weight: 102 kg (225 lb)       Most recent vitals:   Vitals:    12/08/22 1001 12/08/22 1101 12/08/22 1231 12/08/22 1331   BP: (!) 196/122 (!) 189/113 (!) 172/121 (!) 155/106   BP Location:       Patient Position:       Pulse: 74 63 55 60   Resp:       Temp:       TempSrc:       SpO2: 96% 96% 91%    Weight:       Height:           Active LDAs/IV Access:   Lines, Drains & Airways       Active LDAs       Name Placement date Placement time Site Days    Peripheral IV 12/08/22 0652 Left;Posterior Hand 12/08/22  0652  Hand  less than 1    Peripheral IV 12/08/22 0841 Left Antecubital 12/08/22  0841  Antecubital  less than 1                    Labs (abnormal labs have a star):   Labs Reviewed   COMPREHENSIVE METABOLIC PANEL - Abnormal; Notable for the following components:       Result Value    Glucose 110 (*)     All other components within  normal limits    Narrative:     GFR Normal >60  Chronic Kidney Disease <60  Kidney Failure <15     LIPASE - Abnormal; Notable for the following components:    Lipase 220 (*)     All other components within normal limits   URINALYSIS W/ MICROSCOPIC IF INDICATED (NO CULTURE) - Abnormal; Notable for the following components:    Appearance, UA Turbid (*)     All other components within normal limits    Narrative:     Urine microscopic not indicated.   CBC WITH AUTO DIFFERENTIAL - Abnormal; Notable for the following components:    Hemoglobin 12.1 (*)     Hematocrit 35.4 (*)     Eosinophil % 8.8 (*)     Eosinophils, Absolute 0.55 (*)     All other components within normal limits   COVID-19 AND FLU A/B, NP SWAB IN TRANSPORT MEDIA 8-12 HR TAT - Normal    Narrative:     Fact sheet for providers: https://www.fda.gov/media/056850/download    Fact sheet for patients: https://www.fda.gov/media/109345/download    Test performed by PCR.   TROPONIN (IN-HOUSE) - Normal    Narrative:     Troponin T Reference Range:  <= 0.03 ng/mL-   Negative for AMI  >0.03 ng/mL-     Abnormal for myocardial necrosis.  Clinicians would have to utilize clinical acumen, EKG, Troponin and serial changes to determine if it is an Acute Myocardial Infarction or myocardial injury due to an underlying chronic condition.       Results may be falsely decreased if patient taking Biotin.     CBC AND DIFFERENTIAL    Narrative:     The following orders were created for panel order CBC & Differential.  Procedure                               Abnormality         Status                     ---------                               -----------         ------                     CBC Auto Differential[[id number]]        Abnormal            Final result                 Please view results for these tests on the individual orders.       EKG:   ECG 12 Lead Chest Pain   Final Result   HEART RATE= 55  bpm   RR Interval= 1091  ms   GA Interval= 174  ms   P Horizontal Axis= -63   deg   P Front Axis= -14  deg   QRSD Interval= 113  ms   QT Interval= 448  ms   QRS Axis= 14  deg   T Wave Axis= 89  deg   - ABNORMAL ECG -   Sinus rhythm   Nonspecific T abnormalities, lateral leads   When compared with ECG of 02-Dec-2022 11:05:14,   No significant change   Electronically Signed By: Hima [facility], [place]) 08-Dec-2022 12:58:25   Date and Time of Study: 2022-12-08 07:23:54          Meds given in ED:   Medications   sodium chloride 0.9 % flush 10 mL (has no administration in time range)   sodium chloride 0.9 % flush 10 mL (has no administration in time range)   ondansetron (ZOFRAN) injection 4 mg (4 mg Intravenous Given 12/8/22 1417)   acetaminophen (TYLENOL) tablet 650 mg (has no administration in time range)     Or   acetaminophen (TYLENOL) 160 MG/5ML solution 650 mg (has no administration in time range)     Or   acetaminophen (TYLENOL) suppository 650 mg (has no administration in time range)   HYDROmorphone (DILAUDID) injection 0.5 mg (0.5 mg Intravenous Given 12/8/22 1417)   HYDROmorphone (DILAUDID) injection 0.5 mg (0.5 mg Intravenous Given 12/8/22 0716)   ondansetron (ZOFRAN) injection 4 mg (4 mg Intravenous Given 12/8/22 0716)   iopamidol (ISOVUE-370) 76 % injection 100 mL (95 mL Intravenous Given 12/8/22 0905)   HYDROmorphone (DILAUDID) injection 1 mg (1 mg Intravenous Given 12/8/22 1107)   pantoprazole (PROTONIX) injection 80 mg (80 mg Intravenous Given 12/8/22 1112)   labetalol (NORMODYNE,TRANDATE) injection 10 mg (10 mg Intravenous Given 12/8/22 1112)       Imaging results:  No radiology results for the last day    Ambulatory status:   - up x's 1 assist    Social issues:   Social History     Socioeconomic History    Marital status: Single   Tobacco Use    Smoking status: Never    Smokeless tobacco: Never   Vaping Use    Vaping Use: Never used   Substance and Sexual Activity    Alcohol use: Yes     Alcohol/week: 20.0 standard drinks     Types: 10 Cans of beer, 10 Shots of liquor per week      Comment: pt states very rarely    Drug use: No     Comment: Pt states he may have smoked marijuana 3 weeks ago (stated on 11-07-18)    Sexual activity: Not Currently     Partners: Female       NIH Stroke Scale:         Leighann Lux RN  12/08/22 14:22 EST

## 2022-12-08 NOTE — H&P
Patient Name:  Flako Coreas  YOB: 1965  MRN:  [id number]  Admit Date:  12/8/2022  Patient Care Team:  Akash Rodriguez Jr.,  as PCP - General (Sports Medicine)      Subjective   History Present Illness     Chief Complaint   Patient presents with   • Abdominal Pain       Mr. Coreas is a 57 y.o. with a history of GERD, Lepe's esophagus, PUD, HTN, HLD, pancreatitis that presents with abdominal pain.  He was seen on 12/2 for abdominal pain as well.  He had a CT abdomen pelvis at that time was unremarkable.  He states he started having left lower abdominal pain about 2 weeks ago.  The pain started rating up into his chest.  Pain is described as an aching and sharpness.  It is sharper in his chest.  Drinking and eating can make the pain worse.  No obvious alleviating factors.  He has had early satiety and vomiting of food in the evening as well.  He has had watery diarrhea intermittently for the last 3 weeks and he states he is having black watery diarrhea most recently 2 days ago. He occasionally will have bright red blood per rectum as well but he thinks that is hemorrhoidal.  He denies blood in his emesis.  he has a history of Lepe's esophagus but is not on a PPI.  He drinks alcohol almost daily but his last drink was earlier in the week.  He denies a history of alcohol withdrawal but does think he is drinking too much.  His blood pressure in the ER was elevated.  He denies headache, chest pain, shortness of breath, focal neurologic deficits, fever or chills.        History of Present Illness  Review of Systems   Constitutional: Positive for activity change, appetite change and fatigue.   HENT: Negative for trouble swallowing.    Respiratory: Negative for choking, chest tightness and shortness of breath.    Cardiovascular: Positive for chest pain.   Gastrointestinal: Positive for abdominal pain, anal bleeding, blood in stool, diarrhea, nausea and vomiting. Negative for abdominal  distention and constipation.   Genitourinary: Negative for difficulty urinating and dysuria.   Musculoskeletal: Negative for back pain.   Skin: Negative for pallor.   Neurological: Negative for dizziness and light-headedness.   Psychiatric/Behavioral: Negative for confusion.        Personal History     Past Medical History:   Diagnosis Date   • ADHD (attention deficit hyperactivity disorder)     On going issue   • Anxiety    • Benign prostatic hyperplasia Surgery in 12/2021   • Clotting disorder (HCC) Intestinal   • Depression    • Diverticulitis    • Diverticulosis    • Duodenitis    • Enteritis    • Failure to thrive (child)    • Family history of blood clots    • GERD (gastroesophageal reflux disease)    • Hyperlipidemia    • Hypertension    • Hypertensive emergency    • Low testosterone    • Microcytosis    • Pancreatitis    • Pneumonia    • PONV (postoperative nausea and vomiting)    • Seasonal affective disorder (HCC)    • Shoulder injury    • Unexplained weight loss      Past Surgical History:   Procedure Laterality Date   • COLON SURGERY     • COLONOSCOPY     • FRACTURE SURGERY  1980    for broken arm   • FRACTURE SURGERY      for broken hand   • INCISION AND DRAINAGE ABSCESS  2015    anal   • PROSTATE SURGERY     • SMALL INTESTINE SURGERY     • TONSILLECTOMY       Family History   Problem Relation Age of Onset   • Stroke Mother    • Stroke Father      Social History     Tobacco Use   • Smoking status: Never   • Smokeless tobacco: Never   Vaping Use   • Vaping Use: Never used   Substance Use Topics   • Alcohol use: Yes     Alcohol/week: 20.0 standard drinks     Types: 10 Cans of beer, 10 Shots of liquor per week     Comment: pt states very rarely   • Drug use: No     Comment: Pt states he may have smoked marijuana 3 weeks ago (stated on 11-07-18)     No current facility-administered medications on file prior to encounter.     Current Outpatient Medications on File Prior to Encounter   Medication Sig Dispense  Refill   • amitriptyline (ELAVIL) 25 MG tablet Take 1 tablet by mouth Every Night. 90 tablet 1   • amLODIPine (NORVASC) 10 MG tablet Take 1 tablet by mouth Daily. 90 tablet 1   • ibuprofen (ADVIL,MOTRIN) 600 MG tablet Take 600 mg by mouth.     • lisinopril (PRINIVIL,ZESTRIL) 20 MG tablet Take 1 tablet by mouth 2 (Two) Times a Day. 180 tablet 1   • ondansetron (Zofran) 4 MG tablet Take 1 tablet by mouth 4 (Four) Times a Day As Needed for Nausea or Vomiting. 30 tablet 0   • pantoprazole (PROTONIX) 40 MG EC tablet Take 1 tablet by mouth Daily. 60 tablet 0   • rosuvastatin (Crestor) 10 MG tablet Take 1 tablet by mouth Daily. 90 tablet 1   • sucralfate (CARAFATE) 1 g tablet Take 1 tablet by mouth 4 (Four) Times a Day. 20 tablet 0   • vilazodone (Viibryd) 40 MG tablet tablet Take 1 tablet by mouth Daily. 90 tablet 1     No Known Allergies    Objective    Objective     Vital Signs  Temp:  [96.2 °F (35.7 °C)] 96.2 °F (35.7 °C)  Heart Rate:  [55-74] 60  Resp:  [16] 16  BP: (155-203)/(105-128) 155/106  SpO2:  [91 %-97 %] 91 %  on   ;   Device (Oxygen Therapy): room air  Body mass index is 28.89 kg/m².    Physical Exam  Vitals and nursing note reviewed.   Constitutional:       Appearance: He is well-developed. He is obese. He is ill-appearing.   HENT:      Head: Normocephalic and atraumatic.   Eyes:      Conjunctiva/sclera: Conjunctivae normal.   Neck:      Trachea: No tracheal deviation.   Cardiovascular:      Rate and Rhythm: Normal rate and regular rhythm.   Pulmonary:      Effort: Pulmonary effort is normal. No respiratory distress.      Breath sounds: Normal breath sounds.   Abdominal:      General: Abdomen is protuberant. Bowel sounds are normal. There is no distension.      Palpations: Abdomen is soft. There is no mass.      Tenderness: There is abdominal tenderness in the epigastric area and left lower quadrant. There is no guarding or rebound.   Musculoskeletal:         General: Normal range of motion.      Cervical  back: Normal range of motion.   Skin:     General: Skin is warm and dry.   Neurological:      Mental Status: He is alert and oriented to person, place, and time.   Psychiatric:         Judgment: Judgment normal.         Results Review:  I reviewed the patient's new clinical results.  I reviewed the patient's new imaging results and agree with the interpretation.  I reviewed the patient's other test results and agree with the interpretation  I personally viewed and interpreted the patient's EKG/Telemetry data  Discussed with ED provider.    Lab Results (last 24 hours)     Procedure Component Value Units Date/Time    CBC & Differential [[id number]]  (Abnormal) Collected: 12/08/22 0653    Specimen: Blood Updated: 12/08/22 0703    Narrative:      The following orders were created for panel order CBC & Differential.  Procedure                               Abnormality         Status                     ---------                               -----------         ------                     CBC Auto Differential[[id number]]        Abnormal            Final result                 Please view results for these tests on the individual orders.    Comprehensive Metabolic Panel [[id number]]  (Abnormal) Collected: 12/08/22 0653    Specimen: Blood Updated: 12/08/22 0756     Glucose 110 mg/dL      BUN 12 mg/dL      Creatinine 0.84 mg/dL      Sodium 138 mmol/L      Potassium 3.6 mmol/L      Chloride 101 mmol/L      CO2 26.0 mmol/L      Calcium 8.8 mg/dL      Total Protein 6.7 g/dL      Albumin 4.20 g/dL      ALT (SGPT) 20 U/L      AST (SGOT) 17 U/L      Alkaline Phosphatase 74 U/L      Total Bilirubin 0.5 mg/dL      Globulin 2.5 gm/dL      A/G Ratio 1.7 g/dL      BUN/Creatinine Ratio 14.3     Anion Gap 11.0 mmol/L      eGFR 103.0 mL/min/1.73      Comment: National Kidney Foundation and American Society of Nephrology (ASN) Task Force recommended calculation based on the Chronic Kidney Disease Epidemiology Collaboration (CKD-EPI) equation  refit without adjustment for race.       Narrative:      GFR Normal >60  Chronic Kidney Disease <60  Kidney Failure <15      Lipase [[id number]]  (Abnormal) Collected: 12/08/22 0653    Specimen: Blood Updated: 12/08/22 0751     Lipase 220 U/L     CBC Auto Differential [[id number]]  (Abnormal) Collected: 12/08/22 0653    Specimen: Blood Updated: 12/08/22 0703     WBC 6.27 10*3/mm3      RBC 4.24 10*6/mm3      Hemoglobin 12.1 g/dL      Hematocrit 35.4 %      MCV 83.5 fL      MCH 28.5 pg      MCHC 34.2 g/dL      RDW 14.7 %      RDW-SD 44.9 fl      MPV 9.8 fL      Platelets 186 10*3/mm3      Neutrophil % 60.1 %      Lymphocyte % 22.2 %      Monocyte % 8.0 %      Eosinophil % 8.8 %      Basophil % 0.6 %      Immature Grans % 0.3 %      Neutrophils, Absolute 3.77 10*3/mm3      Lymphocytes, Absolute 1.39 10*3/mm3      Monocytes, Absolute 0.50 10*3/mm3      Eosinophils, Absolute 0.55 10*3/mm3      Basophils, Absolute 0.04 10*3/mm3      Immature Grans, Absolute 0.02 10*3/mm3      nRBC 0.2 /100 WBC     Troponin [[id number]]  (Normal) Collected: 12/08/22 0653    Specimen: Blood Updated: 12/08/22 0806     Troponin T <0.010 ng/mL     Narrative:      Troponin T Reference Range:  <= 0.03 ng/mL-   Negative for AMI  >0.03 ng/mL-     Abnormal for myocardial necrosis.  Clinicians would have to utilize clinical acumen, EKG, Troponin and serial changes to determine if it is an Acute Myocardial Infarction or myocardial injury due to an underlying chronic condition.       Results may be falsely decreased if patient taking Biotin.      Urinalysis With Microscopic If Indicated (No Culture) - Urine, Clean Catch [[id number]]  (Abnormal) Collected: 12/08/22 0918    Specimen: Urine, Clean Catch Updated: 12/08/22 0945     Color, UA Yellow     Appearance, UA Turbid     pH, UA 7.0     Specific Gravity, UA 1.014     Glucose, UA Negative     Ketones, UA Negative     Bilirubin, UA Negative     Blood, UA Negative     Protein, UA Negative     Leuk Esterase,  UA Negative     Nitrite, UA Negative     Urobilinogen, UA 0.2 E.U./dL    Narrative:      Urine microscopic not indicated.    COVID-19 and FLU A/B PCR - Swab, Nasopharynx [[id number]]  (Normal) Collected: 12/08/22 0945    Specimen: Swab from Nasopharynx Updated: 12/08/22 1016     COVID19 Not Detected     Influenza A PCR Not Detected     Influenza B PCR Not Detected    Narrative:      Fact sheet for providers: https://www.fda.gov/media/615864/download    Fact sheet for patients: https://www.fda.gov/media/007905/download    Test performed by PCR.          Imaging Results (Last 24 Hours)     Procedure Component Value Units Date/Time    CT Angiogram Chest [[id number]] Collected: 12/08/22 0942     Updated: 12/08/22 1001    Narrative:      CT ANGIOGRAM CHEST-     Radiation dose reduction techniques were utilized, including automated  exposure control and exposure modulation based on body size. CT scan of  the chest performed with intravenous contrast, pulmonary embolus  protocol to include coronal, sagittal and three-dimensional  reconstruction.     CLINICAL INFORMATION: Right-sided upper abdomen and chest pain.     FINDINGS: No pulmonary embolus. Ectasia of the thoracic aorta,  dilatation of the ascending portion, measuring 4.3 cm in diameter. No  indication of dissection. The esophagus is satisfactory in appearance.  Cardiac size upper normal, no pericardial abnormality. No mediastinal or  hilar mass/adenopathy. There are several small mediastinal lymph nodes  seen. There is a 2 cm diverticulum incidentally noted within the right  upper quadrant. No atypical features. Linear areas of scarring versus  discoid atelectasis along each costophrenic sulcus. No pleural effusion,  suspicious pulmonary lesion or infiltrate/consolidation.     CONCLUSION:  1. No acute pulmonary process is demonstrated, specifically no pulmonary  embolus.  2. Dilatation of the ascending thoracic aorta, diameter is 4.3 cm.  3. Typical-appearing  duodenal diverticulum noted incidentally.              This report was finalized on 12/8/2022 9:58 AM by Dr. Tomás Pham M.D.                 ECG 12 Lead Chest Pain   Final Result   HEART RATE= 55  bpm   RR Interval= 1091  ms   MT Interval= 174  ms   P Horizontal Axis= -63  deg   P Front Axis= -14  deg   QRSD Interval= 113  ms   QT Interval= 448  ms   QRS Axis= 14  deg   T Wave Axis= 89  deg   - ABNORMAL ECG -   Sinus rhythm   Nonspecific T abnormalities, lateral leads   When compared with ECG of 02-Dec-2022 11:05:14,   No significant change   Electronically Signed By: Hima Flores ([facility]) 08-Dec-2022 12:58:25   Date and Time of Study: 2022-12-08 07:23:54           Assessment/Plan     Active Hospital Problems    Diagnosis  POA   • **Generalized abdominal pain [R10.84]  Yes   • History of esophagitis [Z87.19]  Not Applicable   • Lepe's esophagus without dysplasia [K22.70]  Yes   • GERD (gastroesophageal reflux disease) [K21.9]  Yes   • Diverticulosis [K57.90]  Yes   • Mixed hyperlipidemia [E78.2]  Yes   • Essential hypertension [I10]  Yes   • Mixed anxiety depressive disorder [F41.8]  Yes      Resolved Hospital Problems   No resolved problems to display.       Mr. Coreas is a 57 y.o. with a history of Lepe's esophagus that is not on PPI who presents with abdominal pain, black diarrhea intermittent, nausea and vomiting.  CT abdomen pelvis with diverticulosis and diastasis of the rectus muscle but no other acute process.  Gallbladder unremarkable and no pancreatitis but lipase is mildly elevated.  He does not have right upper quadrant pain.    Possible GI bleed/atypical chest pain/abdominal pain  Consult gastroenterology.  He does have a history of PUD, Lepe's esophagus and states he is not on a PPI.  He has had a slight decline in hemoglobin since last seen and reports black watery stool. In addition admits to frequent alcohol use  Start IV Protonix drip and carafate and consult gastroenterology  Trend Hgb  q12hr and monitor stool for overt bleeding. NPO at midnight. Add carafate.  He does not meet criteria for pancreatitis but is at risk with ETOH     Atypical Chest pain   CTA without PE. EKG without acute ischemic change. Normal troponin. Likely related to above.     HTN   BP elevated.  Given labetalol in the ER.  Will resume home antihypertensive agents once med list reviewed by nurse.    · I discussed the patient's findings and my recommendations with patient, nursing staff and ED provider.    VTE Prophylaxis - SCDs.  Code Status - Full code.       JANEEN Snow  [facility]  12/08/22  15:23 EST

## 2022-12-08 NOTE — ED PROVIDER NOTES
EMERGENCY DEPARTMENT ENCOUNTER    Room Number:  09/09  Date seen:  12/8/2022  PCP: Akash Rodriguez Jr., DO  Historian: Patient      HPI:  Chief Complaint: Abdomen pain and chest pain, blood in stool  A complete HPI/ROS/PMH/PSH/SH/FH are unobtainable due to: N/A  Context: Flako Coreas is a 57 y.o. male who presents to the ED c/o persistent pain in the upper abdomen and right-sided chest.  Patient attributes his pain to his esophagus.  He says he has had pain like this in the past which has been gastrointestinal in nature.  He was seen here 1 week ago for similar presentation and had a work-up for abdominal pain and he was discharged home with a plan for symptomatic management.  He followed up with his PCP on Tuesday.  Today, the pain seemed to be increasing again and he has seen some blood in his stools recently so he returned here.  He has some nausea but no vomiting.  He has also had some new cough and congestion and flulike symptoms developed this week.  Patient takes Protonix regularly as well as sucralfate.  He has been taking his blood pressure medications as directed.      PAST MEDICAL HISTORY  Active Ambulatory Problems     Diagnosis Date Noted   • Mixed anxiety depressive disorder 12/11/2012   • Mixed hyperlipidemia 06/26/2017   • Essential hypertension 06/26/2017   • Diverticulosis 07/27/2018   • Nodule of spleen 06/27/2018   • Chronic bilateral lower abdominal pain 08/10/2018   • Lepe's esophagus without dysplasia 10/10/2018   • GERD (gastroesophageal reflux disease) 09/18/2018     Resolved Ambulatory Problems     Diagnosis Date Noted   • Abdominal pain 06/20/2018     Past Medical History:   Diagnosis Date   • ADHD (attention deficit hyperactivity disorder)    • Anxiety    • Benign prostatic hyperplasia Surgery in 12/2021   • Clotting disorder (HCC) Intestinal   • Depression    • Diverticulitis    • Duodenitis    • Enteritis    • Failure to thrive (child)    • Family history of blood clots     • Hyperlipidemia    • Hypertension    • Hypertensive emergency    • Low testosterone    • Microcytosis    • Pancreatitis    • Pneumonia    • PONV (postoperative nausea and vomiting)    • Seasonal affective disorder (HCC)    • Shoulder injury    • Unexplained weight loss          PAST SURGICAL HISTORY  Past Surgical History:   Procedure Laterality Date   • COLON SURGERY     • COLONOSCOPY     • FRACTURE SURGERY  1980    for broken arm   • FRACTURE SURGERY      for broken hand   • INCISION AND DRAINAGE ABSCESS  2015    anal   • PROSTATE SURGERY     • SMALL INTESTINE SURGERY     • TONSILLECTOMY           FAMILY HISTORY  Family History   Problem Relation Age of Onset   • Stroke Mother    • Stroke Father          SOCIAL HISTORY  Social History     Socioeconomic History   • Marital status: Single   Tobacco Use   • Smoking status: Never   • Smokeless tobacco: Never   Vaping Use   • Vaping Use: Never used   Substance and Sexual Activity   • Alcohol use: Yes     Alcohol/week: 20.0 standard drinks     Types: 10 Cans of beer, 10 Shots of liquor per week     Comment: pt states very rarely   • Drug use: No     Comment: Pt states he may have smoked marijuana 3 weeks ago (stated on 11-07-18)   • Sexual activity: Not Currently     Partners: Female         ALLERGIES  Patient has no known allergies.        REVIEW OF SYSTEMS  Review of Systems   Constitutional: Positive for activity change, chills and fatigue. Negative for fever.   HENT: Positive for congestion. Negative for trouble swallowing.    Eyes: Negative for pain and visual disturbance.   Respiratory: Positive for cough and shortness of breath.    Cardiovascular: Positive for chest pain.   Gastrointestinal: Positive for abdominal pain and nausea.   Genitourinary: Negative for dysuria and frequency.   Skin: Negative for color change and rash.   Neurological: Negative for syncope and headaches.   All other systems reviewed and are negative.      PHYSICAL EXAM  ED Triage Vitals  [12/08/22 0436]   Temp Heart Rate Resp BP SpO2   96.2 °F (35.7 °C) 65 16 (!) 191/128 96 %      Temp src Heart Rate Source Patient Position BP Location FiO2 (%)   Tympanic Monitor Standing Right arm --         GENERAL: Pleasant gentleman, resting calmly in the bed, appears uncomfortable but no acute distress  HENT: nares patent, normocephalic and atraumatic, moist mucous membrane  EYES: no scleral icterus, EOMI, normal conjunctiva  CV: regular rhythm, normal rate, no murmur  RESPIRATORY: normal effort, lungs clear to auscultation bilaterally  ABDOMEN: soft, mild to moderate tenderness in the epigastrium as well as bilateral upper quadrants but no peritoneal features.  No masses.  Rectal exam: Normal tone, no masses.  No gross blood evident.  Light brown to rust colored stool noted.  Guaiac stain is positive.  MUSCULOSKELETAL: no deformity, no edema or asymmetry  NEURO: alert, moves all extremities, follows commands, no focal deficits  PSYCH:  calm, cooperative  SKIN: warm, dry    Vital signs and nursing notes reviewed.          LAB RESULTS  Recent Results (from the past 24 hour(s))   Comprehensive Metabolic Panel    Collection Time: 12/08/22  6:53 AM    Specimen: Blood   Result Value Ref Range    Glucose 110 (H) 65 - 99 mg/dL    BUN 12 6 - 20 mg/dL    Creatinine 0.84 0.76 - 1.27 mg/dL    Sodium 138 136 - 145 mmol/L    Potassium 3.6 3.5 - 5.2 mmol/L    Chloride 101 98 - 107 mmol/L    CO2 26.0 22.0 - 29.0 mmol/L    Calcium 8.8 8.6 - 10.5 mg/dL    Total Protein 6.7 6.0 - 8.5 g/dL    Albumin 4.20 3.50 - 5.20 g/dL    ALT (SGPT) 20 1 - 41 U/L    AST (SGOT) 17 1 - 40 U/L    Alkaline Phosphatase 74 39 - 117 U/L    Total Bilirubin 0.5 0.0 - 1.2 mg/dL    Globulin 2.5 gm/dL    A/G Ratio 1.7 g/dL    BUN/Creatinine Ratio 14.3 7.0 - 25.0    Anion Gap 11.0 5.0 - 15.0 mmol/L    eGFR 103.0 >60.0 mL/min/1.73   Lipase    Collection Time: 12/08/22  6:53 AM    Specimen: Blood   Result Value Ref Range    Lipase 220 (H) 13 - 60 U/L   CBC  Auto Differential    Collection Time: 12/08/22  6:53 AM    Specimen: Blood   Result Value Ref Range    WBC 6.27 3.40 - 10.80 10*3/mm3    RBC 4.24 4.14 - 5.80 10*6/mm3    Hemoglobin 12.1 (L) 13.0 - 17.7 g/dL    Hematocrit 35.4 (L) 37.5 - 51.0 %    MCV 83.5 79.0 - 97.0 fL    MCH 28.5 26.6 - 33.0 pg    MCHC 34.2 31.5 - 35.7 g/dL    RDW 14.7 12.3 - 15.4 %    RDW-SD 44.9 37.0 - 54.0 fl    MPV 9.8 6.0 - 12.0 fL    Platelets 186 140 - 450 10*3/mm3    Neutrophil % 60.1 42.7 - 76.0 %    Lymphocyte % 22.2 19.6 - 45.3 %    Monocyte % 8.0 5.0 - 12.0 %    Eosinophil % 8.8 (H) 0.3 - 6.2 %    Basophil % 0.6 0.0 - 1.5 %    Immature Grans % 0.3 0.0 - 0.5 %    Neutrophils, Absolute 3.77 1.70 - 7.00 10*3/mm3    Lymphocytes, Absolute 1.39 0.70 - 3.10 10*3/mm3    Monocytes, Absolute 0.50 0.10 - 0.90 10*3/mm3    Eosinophils, Absolute 0.55 (H) 0.00 - 0.40 10*3/mm3    Basophils, Absolute 0.04 0.00 - 0.20 10*3/mm3    Immature Grans, Absolute 0.02 0.00 - 0.05 10*3/mm3    nRBC 0.2 0.0 - 0.2 /100 WBC   Troponin    Collection Time: 12/08/22  6:53 AM    Specimen: Blood   Result Value Ref Range    Troponin T <0.010 0.000 - 0.030 ng/mL   ECG 12 Lead Chest Pain    Collection Time: 12/08/22  7:23 AM   Result Value Ref Range    QT Interval 448 ms   Urinalysis With Microscopic If Indicated (No Culture) - Urine, Clean Catch    Collection Time: 12/08/22  9:18 AM    Specimen: Urine, Clean Catch   Result Value Ref Range    Color, UA Yellow Yellow, Straw    Appearance, UA Turbid (A) Clear    pH, UA 7.0 5.0 - 8.0    Specific Gravity, UA 1.014 1.005 - 1.030    Glucose, UA Negative Negative    Ketones, UA Negative Negative    Bilirubin, UA Negative Negative    Blood, UA Negative Negative    Protein, UA Negative Negative    Leuk Esterase, UA Negative Negative    Nitrite, UA Negative Negative    Urobilinogen, UA 0.2 E.U./dL 0.2 - 1.0 E.U./dL   COVID-19 and FLU A/B PCR - Swab, Nasopharynx    Collection Time: 12/08/22  9:45 AM    Specimen: Nasopharynx; Swab    Result Value Ref Range    COVID19 Not Detected Not Detected - Ref. Range    Influenza A PCR Not Detected Not Detected    Influenza B PCR Not Detected Not Detected       Ordered the above labs and reviewed the results.        RADIOLOGY  CT Angiogram Chest    Result Date: 12/8/2022  CT ANGIOGRAM CHEST-  Radiation dose reduction techniques were utilized, including automated exposure control and exposure modulation based on body size. CT scan of the chest performed with intravenous contrast, pulmonary embolus protocol to include coronal, sagittal and three-dimensional reconstruction.  CLINICAL INFORMATION: Right-sided upper abdomen and chest pain.  FINDINGS: No pulmonary embolus. Ectasia of the thoracic aorta, dilatation of the ascending portion, measuring 4.3 cm in diameter. No indication of dissection. The esophagus is satisfactory in appearance. Cardiac size upper normal, no pericardial abnormality. No mediastinal or hilar mass/adenopathy. There are several small mediastinal lymph nodes seen. There is a 2 cm diverticulum incidentally noted within the right upper quadrant. No atypical features. Linear areas of scarring versus discoid atelectasis along each costophrenic sulcus. No pleural effusion, suspicious pulmonary lesion or infiltrate/consolidation.  CONCLUSION: 1. No acute pulmonary process is demonstrated, specifically no pulmonary embolus. 2. Dilatation of the ascending thoracic aorta, diameter is 4.3 cm. 3. Typical-appearing duodenal diverticulum noted incidentally.     This report was finalized on 12/8/2022 9:58 AM by Dr. Tomás Pham M.D.        Ordered the above noted radiological studies. Reviewed by me in PACS.          PROCEDURES  Procedures    EKG           EKG time/Interp time: 0723/0726  Rhythm/Rate: Sinus rhythm, 55 bpm  P waves and [place]: Present, 174 ms  QRS, axis: 113 ms, normal axis  ST and T waves: No ST segment elevations evident    Independently interpreted by me contemporaneously with  treatment        MEDICATIONS GIVEN IN ER  Medications   sodium chloride 0.9 % flush 10 mL (has no administration in time range)   sodium chloride 0.9 % flush 10 mL (has no administration in time range)   HYDROmorphone (DILAUDID) injection 1 mg (has no administration in time range)   pantoprazole (PROTONIX) injection 80 mg (has no administration in time range)   labetalol (NORMODYNE,TRANDATE) injection 10 mg (has no administration in time range)   HYDROmorphone (DILAUDID) injection 0.5 mg (0.5 mg Intravenous Given 12/8/22 0716)   ondansetron (ZOFRAN) injection 4 mg (4 mg Intravenous Given 12/8/22 0716)   iopamidol (ISOVUE-370) 76 % injection 100 mL (95 mL Intravenous Given 12/8/22 0905)                   MEDICAL DECISION MAKING, PROGRESS, and CONSULTS    All labs have been independently reviewed by me.  All radiology studies have been reviewed by me and discussed with radiologist dictating the report.   EKG's independently viewed and interpreted by me.  Discussion below represents my analysis of pertinent findings related to patient's condition, differential diagnosis, treatment plan and final disposition.      My differential diagnosis for chest pain includes but is not limited to:  Muscle strain, costochondritis, myositis, pleurisy, rib fracture, intercostal neuritis, herpes zoster, tumor, myocardial infarction, coronary syndrome, unstable angina, angina, aortic dissection, mitral valve prolapse, pericarditis, palpitations, pulmonary embolus, pneumonia, pneumothorax, lung cancer, GERD, esophagitis, esophageal spasm    My differential diagnosis for abdominal pain includes but is not limited to:  Gastritis, gastroenteritis, peptic ulcer disease, GERD, esophageal perforation, acute appendicitis, mesenteric adenitis, Meckel’s diverticulum, epiploic appendagitis, diverticulitis, colon cancer, ulcerative colitis, Crohn’s disease, intussusception, small bowel obstruction, adhesions, ischemic bowel, perforated viscus,  "ileus, obstipation, biliary colic, cholecystitis, cholelithiasis, Pablo-Joaquin Dashawn, hepatitis, pancreatitis, common bile duct obstruction, cholangitis, bile leak, splenic trauma, splenic rupture, splenic infarction, splenic abscess, abdominal abscess, ascites, spontaneous bacterial peritonitis, hernia, UTI, cystitis, prostatitis, ureterolithiasis, urinary obstruction, AAA, myocardial infarction, pneumonia, cancer, porphyria, DKA, medications, sickle cell, viral syndrome, zoster      ED Course as of 12/08/22 1052   Thu Dec 08, 2022   0900 Lipase(!): 220 [SOPHIE]   0900 Patient continues to complain of \"abdominal pain\" but when I asked him to point where it hurts the most, he seems to indicate his right-sided chest and thoracic region.  Therefore I am checking a CTA of the chest today.  He just had a CT of the abdomen in the past week as well as basic labs.  We will recheck the basic labs including lipase today to see if there is any significant change.  Blood pressure was quite elevated on arrival but some of that may be due to pain. [SOPHIE]   0944 No PE on the CT angiogram chest [SOPHIE]   1023 Spoke with patient's PCP, Dr. Rodriguez on the phone.  He request that we admit patient to [facility] and pursue GI consultation.  Indicates patient has a rather extensive GI history with known Lepe's esophagus and history of peptic ulcer disease. [SOPHIE]   1024 Hemoglobin(!): 12.1  Hemoglobin noted to have dropped in comparison to recent labs [SOPHIE]   1052 Spoke with Dr. Choudhury from [facility] and he agrees to accept patient for continued care.  He request that we give 1 dose of labetalol for patient's hypertension which I am happy to do.  Also treating the abdominal pain component which I think will probably help his blood pressure as well. [SOPHIE]      ED Course User Index  [SOPHIE] Juan Ramon Flowers MD              Patient was placed in face mask during triage.  Patient was wearing face mask throughout encounter.  I wore personal protective equipment " throughout the encounter.  Hand hygiene was performed before and after patient encounter.     DIAGNOSIS  Final diagnoses:   Generalized abdominal pain   Gastrointestinal hemorrhage, unspecified gastrointestinal hemorrhage type   Elevated lipase   Anemia, unspecified type   Hypertension, unspecified type         DISPOSITION  Admit [facility]            Latest Documented Vital Signs:  As of 10:52 EST  BP- (!) 196/122 HR- 74 Temp- 96.2 °F (35.7 °C) (Tympanic) O2 sat- 96%        --    Please note that portions of this were completed with a voice recognition program.          Juan Ramon Flowers MD  12/08/22 0046

## 2022-12-09 ENCOUNTER — APPOINTMENT (OUTPATIENT)
Dept: CARDIOLOGY | Facility: HOSPITAL | Age: 57
End: 2022-12-09

## 2022-12-09 LAB
ANION GAP SERPL CALCULATED.3IONS-SCNC: 10.6 MMOL/L (ref 5–15)
BH CV ECHO MEAS - DIST REN A EDV LEFT: 13.7 CM/S
BH CV ECHO MEAS - DIST REN A PSV LEFT: 43.7 CM/S
BH CV ECHO MEAS - MID REN A EDV LEFT: 19.6 CM/S
BH CV ECHO MEAS - MID REN A PSV LEFT: 115 CM/S
BH CV ECHO MEAS - PROX REN A EDV LEFT: 34.8 CM/S
BH CV ECHO MEAS - PROX REN A PSV LEFT: 123 CM/S
BH CV VAS BP LEFT ARM: NORMAL MMHG
BH CV VAS BP RIGHT ARM: NORMAL MMHG
BH CV VAS RENAL AORTIC MID PSV: 91.7 CM/S
BH CV VAS RENAL HILUM LEFT EDV: 12.7 CM/S
BH CV VAS RENAL HILUM LEFT PSV: 43.8 CM/S
BH CV VAS RENAL HILUM RIGHT EDV: 13.6 CM/S
BH CV VAS RENAL HILUM RIGHT PSV: 40.8 CM/S
BH CV XLRA MEAS - KID L LEFT: 11.8 CM
BH CV XLRA MEAS DIST REN A EDV RIGHT: 17.3 CM/S
BH CV XLRA MEAS DIST REN A PSV RIGHT: 56.5 CM/S
BH CV XLRA MEAS KID H RIGHT: 6 CM
BH CV XLRA MEAS KID L RIGHT: 12.2 CM
BH CV XLRA MEAS MID REN A EDV RIGHT: 34.3 CM/S
BH CV XLRA MEAS MID REN A PSV RIGHT: 105 CM/S
BH CV XLRA MEAS PROX REN A EDV RIGHT: 28.7 CM/S
BH CV XLRA MEAS PROX REN A PSV RIGHT: 81.9 CM/S
BH CV XLRA MEAS RAR LEFT: 1.3
BH CV XLRA MEAS RAR RIGHT: 1.14
BH CV XLRA MEAS RENAL A ORG EDV LEFT: 23.4 CM/S
BH CV XLRA MEAS RENAL A ORG EDV RIGHT: 26.4 CM/S
BH CV XLRA MEAS RENAL A ORG PSV LEFT: 98.3 CM/S
BH CV XLRA MEAS RENAL A ORG PSV RIGHT: 86.5 CM/S
BUN SERPL-MCNC: 12 MG/DL (ref 6–20)
BUN/CREAT SERPL: 12.9 (ref 7–25)
CALCIUM SPEC-SCNC: 8.8 MG/DL (ref 8.6–10.5)
CHLORIDE SERPL-SCNC: 103 MMOL/L (ref 98–107)
CO2 SERPL-SCNC: 26.4 MMOL/L (ref 22–29)
CREAT SERPL-MCNC: 0.93 MG/DL (ref 0.76–1.27)
DEPRECATED RDW RBC AUTO: 43.8 FL (ref 37–54)
EGFRCR SERPLBLD CKD-EPI 2021: 97 ML/MIN/1.73
ERYTHROCYTE [DISTWIDTH] IN BLOOD BY AUTOMATED COUNT: 14.5 % (ref 12.3–15.4)
GLUCOSE SERPL-MCNC: 98 MG/DL (ref 65–99)
HCT VFR BLD AUTO: 40.6 % (ref 37.5–51)
HCT VFR BLD AUTO: 40.6 % (ref 37.5–51)
HCT VFR BLD AUTO: 42.3 % (ref 37.5–51)
HGB BLD-MCNC: 13.9 G/DL (ref 13–17.7)
HGB BLD-MCNC: 13.9 G/DL (ref 13–17.7)
HGB BLD-MCNC: 14.2 G/DL (ref 13–17.7)
LEFT KIDNEY WIDTH: 4.8 CM
LEFT RENAL UPPER PARENCHYMA MAX: 17.6 CM/S
LEFT RENAL UPPER PARENCHYMA MIN: 6.3 CM/S
LEFT RENAL UPPER PARENCHYMA RI: 0.64
LIPASE SERPL-CCNC: 34 U/L (ref 13–60)
MAXIMAL PREDICTED HEART RATE: 165 BPM
MCH RBC QN AUTO: 28.5 PG (ref 26.6–33)
MCHC RBC AUTO-ENTMCNC: 34.2 G/DL (ref 31.5–35.7)
MCV RBC AUTO: 83.4 FL (ref 79–97)
PLATELET # BLD AUTO: 201 10*3/MM3 (ref 140–450)
PMV BLD AUTO: 9.9 FL (ref 6–12)
POTASSIUM SERPL-SCNC: 3.7 MMOL/L (ref 3.5–5.2)
RBC # BLD AUTO: 4.87 10*6/MM3 (ref 4.14–5.8)
RIGHT RENAL UPPER PARENCHYMA MAX: 21.8 CM/S
RIGHT RENAL UPPER PARENCHYMA MIN: 7.2 CM/S
RIGHT RENAL UPPER PARENCHYMA RI: 0.67
SODIUM SERPL-SCNC: 140 MMOL/L (ref 136–145)
STRESS TARGET HR: 140 BPM
WBC NRBC COR # BLD: 6.14 10*3/MM3 (ref 3.4–10.8)

## 2022-12-09 PROCEDURE — 84244 ASSAY OF RENIN: CPT | Performed by: HOSPITALIST

## 2022-12-09 PROCEDURE — 25010000002 ONDANSETRON PER 1 MG: Performed by: NURSE PRACTITIONER

## 2022-12-09 PROCEDURE — 83690 ASSAY OF LIPASE: CPT | Performed by: NURSE PRACTITIONER

## 2022-12-09 PROCEDURE — 25010000002 HYDROMORPHONE PER 4 MG: Performed by: NURSE PRACTITIONER

## 2022-12-09 PROCEDURE — 80048 BASIC METABOLIC PNL TOTAL CA: CPT | Performed by: NURSE PRACTITIONER

## 2022-12-09 PROCEDURE — 36415 COLL VENOUS BLD VENIPUNCTURE: CPT | Performed by: NURSE PRACTITIONER

## 2022-12-09 PROCEDURE — 93975 VASCULAR STUDY: CPT

## 2022-12-09 PROCEDURE — 82088 ASSAY OF ALDOSTERONE: CPT | Performed by: HOSPITALIST

## 2022-12-09 PROCEDURE — 85018 HEMOGLOBIN: CPT | Performed by: NURSE PRACTITIONER

## 2022-12-09 PROCEDURE — 99222 1ST HOSP IP/OBS MODERATE 55: CPT | Performed by: INTERNAL MEDICINE

## 2022-12-09 PROCEDURE — 85014 HEMATOCRIT: CPT | Performed by: NURSE PRACTITIONER

## 2022-12-09 PROCEDURE — 85027 COMPLETE CBC AUTOMATED: CPT | Performed by: NURSE PRACTITIONER

## 2022-12-09 PROCEDURE — 83835 ASSAY OF METANEPHRINES: CPT | Performed by: HOSPITALIST

## 2022-12-09 RX ORDER — HYDRALAZINE HYDROCHLORIDE 25 MG/1
25 TABLET, FILM COATED ORAL EVERY 8 HOURS SCHEDULED
Status: DISCONTINUED | OUTPATIENT
Start: 2022-12-09 | End: 2022-12-10

## 2022-12-09 RX ORDER — HYDRALAZINE HYDROCHLORIDE 10 MG/1
10 TABLET, FILM COATED ORAL EVERY 4 HOURS PRN
Status: DISCONTINUED | OUTPATIENT
Start: 2022-12-09 | End: 2022-12-12 | Stop reason: HOSPADM

## 2022-12-09 RX ADMIN — HYDROMORPHONE HYDROCHLORIDE 0.5 MG: 1 INJECTION, SOLUTION INTRAMUSCULAR; INTRAVENOUS; SUBCUTANEOUS at 07:42

## 2022-12-09 RX ADMIN — AMLODIPINE BESYLATE 10 MG: 10 TABLET ORAL at 18:20

## 2022-12-09 RX ADMIN — HYDROMORPHONE HYDROCHLORIDE 0.5 MG: 1 INJECTION, SOLUTION INTRAMUSCULAR; INTRAVENOUS; SUBCUTANEOUS at 18:24

## 2022-12-09 RX ADMIN — HYDRALAZINE HYDROCHLORIDE 25 MG: 25 TABLET, FILM COATED ORAL at 22:57

## 2022-12-09 RX ADMIN — PANTOPRAZOLE SODIUM 8 MG/HR: 40 INJECTION, POWDER, FOR SOLUTION INTRAVENOUS at 07:41

## 2022-12-09 RX ADMIN — LISINOPRIL 20 MG: 20 TABLET ORAL at 18:20

## 2022-12-09 RX ADMIN — ROSUVASTATIN CALCIUM 10 MG: 10 TABLET, FILM COATED ORAL at 20:44

## 2022-12-09 RX ADMIN — HYDRALAZINE HYDROCHLORIDE 25 MG: 25 TABLET, FILM COATED ORAL at 18:40

## 2022-12-09 RX ADMIN — LISINOPRIL 20 MG: 20 TABLET ORAL at 20:44

## 2022-12-09 RX ADMIN — VILAZODONE HYDROCHLORIDE 40 MG: 40 TABLET, FILM COATED ORAL at 18:20

## 2022-12-09 RX ADMIN — PANTOPRAZOLE SODIUM 8 MG/HR: 40 INJECTION, POWDER, FOR SOLUTION INTRAVENOUS at 03:08

## 2022-12-09 RX ADMIN — PANTOPRAZOLE SODIUM 8 MG/HR: 40 INJECTION, POWDER, FOR SOLUTION INTRAVENOUS at 18:19

## 2022-12-09 RX ADMIN — ONDANSETRON 4 MG: 2 INJECTION INTRAMUSCULAR; INTRAVENOUS at 20:44

## 2022-12-09 RX ADMIN — SUCRALFATE 1 G: 1 TABLET ORAL at 18:30

## 2022-12-09 RX ADMIN — HYDROMORPHONE HYDROCHLORIDE 0.5 MG: 1 INJECTION, SOLUTION INTRAMUSCULAR; INTRAVENOUS; SUBCUTANEOUS at 11:21

## 2022-12-09 RX ADMIN — ONDANSETRON 4 MG: 2 INJECTION INTRAMUSCULAR; INTRAVENOUS at 07:41

## 2022-12-09 RX ADMIN — SUCRALFATE 1 G: 1 TABLET ORAL at 20:44

## 2022-12-09 RX ADMIN — HYDROMORPHONE HYDROCHLORIDE 0.5 MG: 1 INJECTION, SOLUTION INTRAMUSCULAR; INTRAVENOUS; SUBCUTANEOUS at 22:57

## 2022-12-09 RX ADMIN — PANTOPRAZOLE SODIUM 8 MG/HR: 40 INJECTION, POWDER, FOR SOLUTION INTRAVENOUS at 12:57

## 2022-12-09 RX ADMIN — HYDROMORPHONE HYDROCHLORIDE 0.5 MG: 1 INJECTION, SOLUTION INTRAMUSCULAR; INTRAVENOUS; SUBCUTANEOUS at 03:15

## 2022-12-09 NOTE — PLAN OF CARE
Goal Outcome Evaluation:  Plan of Care Reviewed With: patient        Progress: no change  Outcome Evaluation: Patient came to the ED via EMS from assisted living facility with c/o persistent pain in the upper abdomen and right-sided chest.  Patient attributes his pain to his esophagus. Pain treated with Dilaudid PRN. Protonix drip. Patient is ad chinyere, alert and oriented x 4. SATs in mid 90s on RA, SR to SB on monitor. BP high, treated PRN with hydralazine. Patient is resting in bed, will continue to monitor.

## 2022-12-09 NOTE — CONSULTS
[facility] Consult Note      Patient Name: Flako Coreas  : 1965  MRN: [id number]  Primary Care Physician:  Akash Rodriguez Jr., DO  Referring Physician: No ref. provider found  Date of admission: 2022    Subjective     Reason for Consult:   Uncontrolled hypertension     HPI:   Flako Coreas is a 57 y.o. male known to have history of hypertension diagnosed at a very young age at the age of 22, poorly controlled, history of hyperlipidemia, history of peptic ulcer disease, history of GERD and Lepe's esophagus who presented to the hospital with abdominal pain mainly located at the left lower quadrant.  CT scan of the abdomen pelvis was unremarkable.  Noted to have uncontrolled hypertension per his report with systolic blood pressure running around 180-190  Patient noted that he takes ibuprofen intermittently.  Noted episodes of tachycardia with flushing that has been recurrent.  Denies any drug abuse except for marijuana.  Noted that he drinks alcohol almost daily.  Labs today showed a sodium 140, potassium 3.7, bicarb 26, creatinine 0.9.  Nephrology was consulted for management of hypertension  Review of Systems:   14 point review of systems is otherwise negative except for mentioned above on HPI    Personal History     Past Medical History:   Diagnosis Date   • ADHD (attention deficit hyperactivity disorder)     On going issue   • Anxiety    • Lepe's esophagus    • Benign prostatic hyperplasia Surgery in 2021   • Clotting disorder (HCC) Intestinal   • Depression    • Diverticulitis    • Diverticulosis    • Duodenitis    • Enteritis    • Failure to thrive (child)    • Family history of blood clots    • GERD (gastroesophageal reflux disease)    • Hyperlipidemia    • Hypertension    • Hypertensive emergency    • Low testosterone    • Microcytosis    • Pancreatitis    • Pneumonia    • PONV (postoperative nausea and vomiting)    • Seasonal affective disorder  ([facility])    • Shoulder injury    • Unexplained weight loss        Past Surgical History:   Procedure Laterality Date   • COLON SURGERY     • COLONOSCOPY     • FRACTURE SURGERY  1980    for broken arm   • FRACTURE SURGERY      for broken hand   • INCISION AND DRAINAGE ABSCESS  2015    anal   • PROSTATE SURGERY     • SMALL INTESTINE SURGERY     • TONSILLECTOMY         Family History: family history includes Stroke in his father and mother.    Social History:  reports that he has never smoked. He has never used smokeless tobacco. He reports current alcohol use of about 20.0 standard drinks per week. He reports that he does not use drugs.    Home Medications:  Prior to Admission medications    Medication Sig Start Date End Date Taking? Authorizing Provider   amitriptyline (ELAVIL) 25 MG tablet Take 1 tablet by mouth Every Night.  Patient taking differently: Take 25 mg by mouth At Night As Needed. 5/11/22  Yes Akash Rodriguez Jr., DO   amLODIPine (NORVASC) 10 MG tablet Take 1 tablet by mouth Daily. 11/11/22  Yes Akash Rodriguez Jr., DO   ibuprofen (ADVIL,MOTRIN) 600 MG tablet Take 600 mg by mouth Every 8 (Eight) Hours As Needed. 12/31/21  Yes Provider, MD Ovidio   lisinopril (PRINIVIL,ZESTRIL) 20 MG tablet Take 1 tablet by mouth 2 (Two) Times a Day. 11/11/22  Yes Akash Rodriguez Jr., DO   ondansetron (Zofran) 4 MG tablet Take 1 tablet by mouth 4 (Four) Times a Day As Needed for Nausea or Vomiting. 12/6/22  Yes Akash Rodriguez Jr., DO   pantoprazole (PROTONIX) 40 MG EC tablet Take 1 tablet by mouth Daily. 12/6/22  Yes Akash Rodriguez Jr., DO   rosuvastatin (Crestor) 10 MG tablet Take 1 tablet by mouth Daily.  Patient taking differently: Take 10 mg by mouth Every Night. 5/11/22  Yes Akash Rodriguez Jr., DO   sucralfate (CARAFATE) 1 g tablet Take 1 tablet by mouth 4 (Four) Times a Day. 12/6/22  Yes Akash Rodriguez Jr., DO   vilazodone (Viibryd) 40 MG tablet tablet  Take 1 tablet by mouth Daily. 11/11/22  Yes Akash Rodriguez Jr., DO       Allergies:  No Known Allergies    Objective     Vitals:   Temp:  [97.5 °F (36.4 °C)-98.4 °F (36.9 °C)] 97.8 °F (36.6 °C)  Heart Rate:  [53-60] 58  Resp:  [16-18] 16  BP: (138-190)/() 165/103    Intake/Output Summary (Last 24 hours) at 12/9/2022 1306  Last data filed at 12/9/2022 0719  Gross per 24 hour   Intake --   Output 925 ml   Net -925 ml       Physical Exam:   Constitutional: Awake, alert, no acute distress.  HEENT: Sclera anicteric, no conjunctival injection  Neck: Supple, no thyromegaly, no lymphadenopathy, trachea at midline, no JVD  Respiratory: Clear to auscultation bilaterally, nonlabored respiration  Cardiovascular: RRR, no murmurs, no rubs or gallops, no carotid bruit  Gastrointestinal: Positive bowel sounds, abdomen is soft, nontender and nondistended  : No palpable bladder  Musculoskeletal: No edema, no clubbing or cyanosis  Psychiatric: Appropriate affect, cooperative  Neurologic: Oriented x3, moving all extremities, normal speech and mental status  Skin: Warm and dry       Scheduled Meds:     amLODIPine, 10 mg, Oral, Daily  lisinopril, 20 mg, Oral, BID  rosuvastatin, 10 mg, Oral, Nightly  sucralfate, 1 g, Oral, 4x Daily AC & at Bedtime  vilazodone, 40 mg, Oral, Daily      IV Meds:   pantoprazole, 8 mg/hr, Last Rate: 8 mg/hr (12/09/22 1257)        Results Reviewed:   I have personally reviewed the results from the time of this admission to 12/9/2022 13:06 EST     Lab Results   Component Value Date    GLUCOSE 98 12/09/2022    CALCIUM 8.8 12/09/2022     12/09/2022    K 3.7 12/09/2022    CO2 26.4 12/09/2022     12/09/2022    BUN 12 12/09/2022    CREATININE 0.93 12/09/2022    EGFRIFAFRI >60 09/28/2018    EGFRIFNONA 98 11/05/2018    BCR 12.9 12/09/2022    ANIONGAP 10.6 12/09/2022      Lab Results   Component Value Date    MG 1.8 11/15/2018    ALBUMIN 4.20 12/08/2022           Assessment / Plan      ASSESSMENT:  1. Uncontrolled hypertension: Patient history of hypokalemia despite being on lisinopril.  Given young age of presentation, presence of hypokalemia a secondary reason for hypertension is really high on differential diagnosis.  His chronic NSAID use is probably contributing also  2. Hyperlipidemia on statins  3. Abdominal pain followed by GI  4. History of GERD  5. Chronic alcoholism        PLAN:  · Patient noted episodes of flushing and tachycardia suspicious of pheochromocytoma.  · Would recommend to avoid all NSAIDs at all costs.  · Continue lisinopril 20 mg twice daily and amlodipine 10 mg daily.  · We will add hydralazine 25 mg 3 times daily  · We will check duplex of renal artery.  · Secondary work-up for hypertension including plasma renin activity, serum Moise, serum metanephrines.  · Avoid alcohol intake      Thank you for involving us in the care of Flako Coreas.  Please feel free to call with any questions.    Chitra Langley MD  12/09/22  13:06 [facility]    [facility]  [phone]    Parts of this note may be an electronic transcription/translation of spoken language to printed text using the Dragon dictation system.

## 2022-12-09 NOTE — CONSULTS
"Chief Complaint   Patient presents with   • Abdominal Pain       Flako Coreas is a 57 y.o. male who presents with abdominal pain and intermittent black stools, n/v    HPI  Mr. Coreas is a 57 yoM w h/o Lepe's esophagus not on PPI,  Previous small bowel resection reportedly for small bowel diverticulitis, diverticulosis, HTN, HLD who presents with abd pain and intermittent black stools and n/v.  He reports he has pain in his left mid/lower abdomen and in his right upper chest.  He reports nausea, no emesis.  Reports pain in his abdomen occasionally with eating.  He reports chills, no overt fevers.  He reports severe heartburn and reflux symptoms.  He also notes severe anxiety and depression and was started on Viibryd by his PCP.  He continues to drink EtOH mostly on the weekends at least a 12 pack of beer.     He had CT abd/pelvis on 12/2 admission that noted stable subcentimeter cyst in anterior hepatic segment, unremarkable GB with no pedro dil, normal pancreas, diverticulosis without diverticulitis, no small bowel abnormality, evidence of small bowel resection, small left inguinal hernia and diastasis of rectus muscle, and interval decrease in size and number of low-atten foci within spleen.  He had CTA chest here with no acute changes, did note duodenal diverticulum, CT head done for HTN and HA negative.  Hemogram is normal.  Resp panel normal.  UA negative.  CMP normal.  Lipase 220 however with normal pancreas on previous imaging.     He had colonoscopy with Dr. Springer in 2018 that showed hemorrhoids, normal TI, diverticulosis with recommended repeat in 10 years.  He also had EGD in 2018 that showed \"esophageal mucosal changes suspicious for long-segment  Lepe's esophagus present in the lower third of the esophagus. 6cm Hiatal hernia,The  maximum longitudinal extent of these mucosal changes was 4 cm in  length. Diffuse moderate erythema was found in the distal esophagus.Striped moderately erythematous " "mucosa without bleeding was found in the gastric antrum. One 5 mm sessile polyp with no bleeding and no stigmata of recent bleeding was found in the gastric fundus.''  -surgical pathology from biopsies:  ''FINAL DIAGNOSIS   1. Antrum, biopsy (A) - Fundic mucosa with patchy reactive gastropathy-type   changes. No evidence of H. pylori.   2. Esophagus at 35 cm, biopsy (B) - Lepe's esophagus, negative for   dysplasia.   3. Esophagus at 25 cm, biopsy (C) - Lepe's esophagus with reactive   epithelial changes. - Negative for dysplasia.\"  Past Medical History:   Diagnosis Date   • ADHD (attention deficit hyperactivity disorder)     On going issue   • Anxiety    • Lepe's esophagus    • Benign prostatic hyperplasia Surgery in 12/2021   • Clotting disorder (HCC) Intestinal   • Depression    • Diverticulitis    • Diverticulosis    • Duodenitis    • Enteritis    • Failure to thrive (child)    • Family history of blood clots    • GERD (gastroesophageal reflux disease)    • Hyperlipidemia    • Hypertension    • Hypertensive emergency    • Low testosterone    • Microcytosis    • Pancreatitis    • Pneumonia    • PONV (postoperative nausea and vomiting)    • Seasonal affective disorder (HCC)    • Shoulder injury    • Unexplained weight loss        Past Surgical History:   Procedure Laterality Date   • COLON SURGERY     • COLONOSCOPY     • FRACTURE SURGERY  1980    for broken arm   • FRACTURE SURGERY      for broken hand   • INCISION AND DRAINAGE ABSCESS  2015    anal   • PROSTATE SURGERY     • SMALL INTESTINE SURGERY     • TONSILLECTOMY           Current Facility-Administered Medications:   •  acetaminophen (TYLENOL) tablet 650 mg, 650 mg, Oral, Q4H PRN **OR** acetaminophen (TYLENOL) 160 MG/5ML solution 650 mg, 650 mg, Oral, Q4H PRN **OR** acetaminophen (TYLENOL) suppository 650 mg, 650 mg, Rectal, Q4H PRN, Viviane Thakkar, JANEEN  •  amLODIPine (NORVASC) tablet 10 mg, 10 mg, Oral, Daily, Alden Choudhury MD, 10 mg at " 12/08/22 2020  •  hydrALAZINE (APRESOLINE) tablet 10 mg, 10 mg, Oral, Q4H PRN, Alden Choudhury MD  •  HYDROmorphone (DILAUDID) injection 0.5 mg, 0.5 mg, Intravenous, Q3H PRN, Viviane Thakkar APRN, 0.5 mg at 12/09/22 0742  •  lisinopril (PRINIVIL,ZESTRIL) tablet 20 mg, 20 mg, Oral, BID, Alden Choudhury MD, 20 mg at 12/08/22 2020  •  ondansetron (ZOFRAN) injection 4 mg, 4 mg, Intravenous, Q6H PRN, Viviane Thakkar APRN, 4 mg at 12/09/22 0741  •  ondansetron (ZOFRAN) tablet 4 mg, 4 mg, Oral, 4x Daily PRN, Alden Choudhury MD  •  pantoprazole (PROTONIX) 40 mg in 100 mL NS (VTB), 8 mg/hr, Intravenous, Continuous, Viviane Thakkar APRN, Last Rate: 20 mL/hr at 12/09/22 0741, 8 mg/hr at 12/09/22 0741  •  rosuvastatin (CRESTOR) tablet 10 mg, 10 mg, Oral, Nightly, Alden Choudhury MD, 10 mg at 12/08/22 2020  •  [COMPLETED] Insert Peripheral IV, , , Once **AND** sodium chloride 0.9 % flush 10 mL, 10 mL, Intravenous, PRN, Barry Thakkar MD  •  [COMPLETED] Insert Peripheral IV, , , Once **AND** sodium chloride 0.9 % flush 10 mL, 10 mL, Intravenous, PRN, Juan Ramon Flowers MD  •  sucralfate (CARAFATE) tablet 1 g, 1 g, Oral, 4x Daily AC & at Bedtime, Viviane Thakkar APRN, 1 g at 12/08/22 2020  •  vilazodone (VIIBRYD) tablet 40 mg, 40 mg, Oral, Daily, Alden Choudhury MD, 40 mg at 12/08/22 2020    No Known Allergies    Social History     Socioeconomic History   • Marital status: Single   Tobacco Use   • Smoking status: Never   • Smokeless tobacco: Never   Vaping Use   • Vaping Use: Never used   Substance and Sexual Activity   • Alcohol use: Yes     Alcohol/week: 20.0 standard drinks     Types: 10 Cans of beer, 10 Shots of liquor per week     Comment: pt states very rarely   • Drug use: No     Comment: Pt states he may have smoked marijuana 3 weeks ago (stated on 11-07-18)   • Sexual activity: Not Currently     Partners: Female       Family History   Problem Relation Age of Onset   • Stroke Mother     • Stroke Father        Review of Systems   Constitutional: Negative for chills and fever.   Respiratory: Negative for cough and shortness of breath.    Gastrointestinal: Positive for abdominal pain and nausea. Negative for abdominal distention and vomiting.   Skin: Negative for color change and rash.   All other systems reviewed and are negative.      Vitals:    12/09/22 0719   BP: (!) 155/109   Pulse: 58   Resp: 16   Temp: 97.8 °F (36.6 °C)   SpO2: 90%       Physical Exam     General: patient awake, alert and cooperative   Eyes: Normal lids and lashes, no scleral icterus   Neck: supple, normal ROM   Skin: warm and dry, not jaundiced   Cardiovascular: regular rate, regular rhythm, well perfused extremities   Pulm: Equal expansion bilaterally, no increased WOB   Abdomen: soft, non-tender, nondistended;    Extremities: no rash or edema              Neuro: A&O, no obvious sign of focal deficit   Psychiatric: Anxious, restless    XR Chest 2 View    Result Date: 12/2/2022  1. No acute process.  This report was finalized on 12/2/2022 11:51 AM by Dr. Salvador Arita M.D.      CT Head Without Contrast    Result Date: 12/8/2022  Negative.  This report was finalized on 12/8/2022 8:52 PM by Dr. Pk Oropeza M.D.      CT Abdomen Pelvis With Contrast    Result Date: 12/4/2022  1. There is diverticulosis scattered throughout the colon without convincing evidence for diverticulitis. 2. Interval decrease in splenic size and number of low-attenuation foci within the spleen. Please follow up as clinically indicated. 3. Development of 4.2 cm diastasis of the rectus muscle bellies with protrusion of fat, but no definitive fascial defect is seen. Slight increase in small left inguinal hernia containing fat.  This report was finalized on 12/4/2022 12:29 AM by Dr. Kelsey Meade M.D.        Impression:  · Abdominal and Right Upper Chest Pain  · Nausea and Emesis   · H/o Esophagitis and Long Segment Lepe's Esophagus 2018  EGD  · Intermittent Black Stools  · Heartburn and Reflux  · EtOH Abuse  · Anxiety and Depression    Plan:  - Lipase 220 on admission, however symptoms are not classic for pancreatitis and CT abd/pelvis on 12/2 when he was having identical symptoms with normal pancreas and no other acute findings  - CTA chest negative  - Recommend EtOH cessation, any withdrawal monitoring per primary team  - Also needs to have anxiety/depression optimized  - Hb and BUN normal   - Continue IV PPI for now  - Given his reported intermittent black stools and severe heartburn symptoms and h/o long segment Lepe's, will plan for EGD tomorrow      Pk Valdez MD

## 2022-12-09 NOTE — PAYOR COMM NOTE
"Coreas Nimesh KAVEH (57 y.o. Male)     PLEASE SEE ATTACHED FOR INPT AUTH.     REF#[id number]    PLEASE CALL   OR  [phone]    THANK YOU    CAL TIJERINA LPN CCP    Date of Birth   1965    Social Security Number       Address   [address] [place] [address]    Home Phone   [phone]    MRN   [id number]       Mandaen   Rastafarian    Marital Status   Single                            Admission Date   12/8/22    Admission Type   Emergency    Admitting Provider   Alden Choudhury MD    Attending Provider   Alden Choudhury MD    Department, Room/Bed   [address], [address]/1       Discharge Date       Discharge Disposition       Discharge Destination                               Attending Provider: Alden Choudhury MD    Allergies: No Known Allergies    Isolation: None   Infection: None   Code Status: CPR    Ht: 188 cm (74\")   Wt: 102 kg (224 lb 8 oz)    Admission Cmt: None   Principal Problem: Generalized abdominal pain [R10.84]                 Active Insurance as of 12/8/2022     Primary Coverage     Payor Plan Insurance Group Employer/Plan Group    [employer] BLUE [employer]OSS [employer] BLUE CROSS BLUE [employer]IELD PPO [id number]     Payor Plan Address Payor Plan Phone Number Payor Plan Fax Number Effective Dates    [phone]  8/1/2022 - None Entered    [place] [address]       Subscriber Name Subscriber Birth Date Member ID       NIMESH COREAS 1965 [id number]                 Emergency Contacts      (Rel.) Home Phone Work Phone Mobile Phone    Pk Coreas (Brother) [phone] -- --    CoreasNimesh juarez (Son) [phone] -- --            [place]: [facility] [id number]  Tax ID [id number]     History & Physical      Viviane Thakkar APRN at 12/08/22 1523     Attestation signed by Alden Choudhury MD at 12/08/22 9817    I have reviewed the history and plan as obtained by the APRN. I have independently examined the patient " and I personally performed >50% of the management in this split shared patient. My exam confirms her physical findings and I agree with the plan as listed with exceptions listed below.      56yo with Lepe's esophagus he has had abd pain for long time, no flair in long time, but has had pain that is severe for one week, pain is up and down, sleep sitting up as that helps, has to eat light, like just a little bit of eggs. Drinking liquid, he has had headaches    He had a similar hospitalization in 2018.      Gen: aa  Heart: rr  Lung: cta  Abd: soft nt/nd    Abdominal pin, possibly gastritis  -protonix, carafate  -Lipase elevated  -GI consult    HTN  -elevated  -give norvasc and lisinopril now  -given labetolol in the ER  -give more pain medication now                      Patient Name:  Flako Coreas  YOB: 1965  MRN:  [id number]  Admit Date:  12/8/2022  Patient Care Team:  Akash Rodriguez Jr., DO as PCP - General (Sports Medicine)      Subjective    History Present Illness     Chief Complaint   Patient presents with   • Abdominal Pain       Mr. Coreas is a 57 y.o. with a history of GERD, Lepe's esophagus, PUD, HTN, HLD, pancreatitis that presents with abdominal pain.  He was seen on 12/2 for abdominal pain as well.  He had a CT abdomen pelvis at that time was unremarkable.  He states he started having left lower abdominal pain about 2 weeks ago.  The pain started rating up into his chest.  Pain is described as an aching and sharpness.  It is sharper in his chest.  Drinking and eating can make the pain worse.  No obvious alleviating factors.  He has had early satiety and vomiting of food in the evening as well.  He has had watery diarrhea intermittently for the last 3 weeks and he states he is having black watery diarrhea most recently 2 days ago. He occasionally will have bright red blood per rectum as well but he thinks that is hemorrhoidal.  He denies blood in his emesis.  he has a  history of Lepe's esophagus but is not on a PPI.  He drinks alcohol almost daily but his last drink was earlier in the week.  He denies a history of alcohol withdrawal but does think he is drinking too much.  His blood pressure in the ER was elevated.  He denies headache, chest pain, shortness of breath, focal neurologic deficits, fever or chills.        History of Present Illness  Review of Systems   Constitutional: Positive for activity change, appetite change and fatigue.   HENT: Negative for trouble swallowing.    Respiratory: Negative for choking, chest tightness and shortness of breath.    Cardiovascular: Positive for chest pain.   Gastrointestinal: Positive for abdominal pain, anal bleeding, blood in stool, diarrhea, nausea and vomiting. Negative for abdominal distention and constipation.   Genitourinary: Negative for difficulty urinating and dysuria.   Musculoskeletal: Negative for back pain.   Skin: Negative for pallor.   Neurological: Negative for dizziness and light-headedness.   Psychiatric/Behavioral: Negative for confusion.        Personal History     Past Medical History:   Diagnosis Date   • ADHD (attention deficit hyperactivity disorder)     On going issue   • Anxiety    • Benign prostatic hyperplasia Surgery in 12/2021   • Clotting disorder (HCC) Intestinal   • Depression    • Diverticulitis    • Diverticulosis    • Duodenitis    • Enteritis    • Failure to thrive (child)    • Family history of blood clots    • GERD (gastroesophageal reflux disease)    • Hyperlipidemia    • Hypertension    • Hypertensive emergency    • Low testosterone    • Microcytosis    • Pancreatitis    • Pneumonia    • PONV (postoperative nausea and vomiting)    • Seasonal affective disorder (HCC)    • Shoulder injury    • Unexplained weight loss      Past Surgical History:   Procedure Laterality Date   • COLON SURGERY     • COLONOSCOPY     • FRACTURE SURGERY  1980    for broken arm   • FRACTURE SURGERY      for broken hand    • INCISION AND DRAINAGE ABSCESS  2015    anal   • PROSTATE SURGERY     • SMALL INTESTINE SURGERY     • TONSILLECTOMY       Family History   Problem Relation Age of Onset   • Stroke Mother    • Stroke Father      Social History     Tobacco Use   • Smoking status: Never   • Smokeless tobacco: Never   Vaping Use   • Vaping Use: Never used   Substance Use Topics   • Alcohol use: Yes     Alcohol/week: 20.0 standard drinks     Types: 10 Cans of beer, 10 Shots of liquor per week     Comment: pt states very rarely   • Drug use: No     Comment: Pt states he may have smoked marijuana 3 weeks ago (stated on 11-07-18)     No current facility-administered medications on file prior to encounter.     Current Outpatient Medications on File Prior to Encounter   Medication Sig Dispense Refill   • amitriptyline (ELAVIL) 25 MG tablet Take 1 tablet by mouth Every Night. 90 tablet 1   • amLODIPine (NORVASC) 10 MG tablet Take 1 tablet by mouth Daily. 90 tablet 1   • ibuprofen (ADVIL,MOTRIN) 600 MG tablet Take 600 mg by mouth.     • lisinopril (PRINIVIL,ZESTRIL) 20 MG tablet Take 1 tablet by mouth 2 (Two) Times a Day. 180 tablet 1   • ondansetron (Zofran) 4 MG tablet Take 1 tablet by mouth 4 (Four) Times a Day As Needed for Nausea or Vomiting. 30 tablet 0   • pantoprazole (PROTONIX) 40 MG EC tablet Take 1 tablet by mouth Daily. 60 tablet 0   • rosuvastatin (Crestor) 10 MG tablet Take 1 tablet by mouth Daily. 90 tablet 1   • sucralfate (CARAFATE) 1 g tablet Take 1 tablet by mouth 4 (Four) Times a Day. 20 tablet 0   • vilazodone (Viibryd) 40 MG tablet tablet Take 1 tablet by mouth Daily. 90 tablet 1     No Known Allergies    Objective     Objective     Vital Signs  Temp:  [96.2 °F (35.7 °C)] 96.2 °F (35.7 °C)  Heart Rate:  [55-74] 60  Resp:  [16] 16  BP: (155-203)/(105-128) 155/106  SpO2:  [91 %-97 %] 91 %  on   ;   Device (Oxygen Therapy): room air  Body mass index is 28.89 kg/m².    Physical Exam  Vitals and nursing note reviewed.    Constitutional:       Appearance: He is well-developed. He is obese. He is ill-appearing.   HENT:      Head: Normocephalic and atraumatic.   Eyes:      Conjunctiva/sclera: Conjunctivae normal.   Neck:      Trachea: No tracheal deviation.   Cardiovascular:      Rate and Rhythm: Normal rate and regular rhythm.   Pulmonary:      Effort: Pulmonary effort is normal. No respiratory distress.      Breath sounds: Normal breath sounds.   Abdominal:      General: Abdomen is protuberant. Bowel sounds are normal. There is no distension.      Palpations: Abdomen is soft. There is no mass.      Tenderness: There is abdominal tenderness in the epigastric area and left lower quadrant. There is no guarding or rebound.   Musculoskeletal:         General: Normal range of motion.      Cervical back: Normal range of motion.   Skin:     General: Skin is warm and dry.   Neurological:      Mental Status: He is alert and oriented to person, place, and time.   Psychiatric:         Judgment: Judgment normal.         Results Review:  I reviewed the patient's new clinical results.  I reviewed the patient's new imaging results and agree with the interpretation.  I reviewed the patient's other test results and agree with the interpretation  I personally viewed and interpreted the patient's EKG/Telemetry data  Discussed with ED provider.    Lab Results (last 24 hours)     Procedure Component Value Units Date/Time    CBC & Differential [[id number]]  (Abnormal) Collected: 12/08/22 0653    Specimen: Blood Updated: 12/08/22 0703    Narrative:      The following orders were created for panel order CBC & Differential.  Procedure                               Abnormality         Status                     ---------                               -----------         ------                     CBC Auto Differential[[id number]]        Abnormal            Final result                 Please view results for these tests on the individual orders.     Comprehensive Metabolic Panel [[id number]]  (Abnormal) Collected: 12/08/22 0653    Specimen: Blood Updated: 12/08/22 0756     Glucose 110 mg/dL      BUN 12 mg/dL      Creatinine 0.84 mg/dL      Sodium 138 mmol/L      Potassium 3.6 mmol/L      Chloride 101 mmol/L      CO2 26.0 mmol/L      Calcium 8.8 mg/dL      Total Protein 6.7 g/dL      Albumin 4.20 g/dL      ALT (SGPT) 20 U/L      AST (SGOT) 17 U/L      Alkaline Phosphatase 74 U/L      Total Bilirubin 0.5 mg/dL      Globulin 2.5 gm/dL      A/G Ratio 1.7 g/dL      BUN/Creatinine Ratio 14.3     Anion Gap 11.0 mmol/L      eGFR 103.0 mL/min/1.73      Comment: National Kidney Foundation and American Society of Nephrology (ASN) Task Force recommended calculation based on the Chronic Kidney Disease Epidemiology Collaboration (CKD-EPI) equation refit without adjustment for race.       Narrative:      GFR Normal >60  Chronic Kidney Disease <60  Kidney Failure <15      Lipase [[id number]]  (Abnormal) Collected: 12/08/22 0653    Specimen: Blood Updated: 12/08/22 0751     Lipase 220 U/L     CBC Auto Differential [[id number]]  (Abnormal) Collected: 12/08/22 0653    Specimen: Blood Updated: 12/08/22 0703     WBC 6.27 10*3/mm3      RBC 4.24 10*6/mm3      Hemoglobin 12.1 g/dL      Hematocrit 35.4 %      MCV 83.5 fL      MCH 28.5 pg      MCHC 34.2 g/dL      RDW 14.7 %      RDW-SD 44.9 fl      MPV 9.8 fL      Platelets 186 10*3/mm3      Neutrophil % 60.1 %      Lymphocyte % 22.2 %      Monocyte % 8.0 %      Eosinophil % 8.8 %      Basophil % 0.6 %      Immature Grans % 0.3 %      Neutrophils, Absolute 3.77 10*3/mm3      Lymphocytes, Absolute 1.39 10*3/mm3      Monocytes, Absolute 0.50 10*3/mm3      Eosinophils, Absolute 0.55 10*3/mm3      Basophils, Absolute 0.04 10*3/mm3      Immature Grans, Absolute 0.02 10*3/mm3      nRBC 0.2 /100 WBC     Troponin [[id number]]  (Normal) Collected: 12/08/22 0653    Specimen: Blood Updated: 12/08/22 0806     Troponin T <0.010 ng/mL     Narrative:       Troponin T Reference Range:  <= 0.03 ng/mL-   Negative for AMI  >0.03 ng/mL-     Abnormal for myocardial necrosis.  Clinicians would have to utilize clinical acumen, EKG, Troponin and serial changes to determine if it is an Acute Myocardial Infarction or myocardial injury due to an underlying chronic condition.       Results may be falsely decreased if patient taking Biotin.      Urinalysis With Microscopic If Indicated (No Culture) - Urine, Clean Catch [[id number]]  (Abnormal) Collected: 12/08/22 0918    Specimen: Urine, Clean Catch Updated: 12/08/22 0945     Color, UA Yellow     Appearance, UA Turbid     pH, UA 7.0     Specific Gravity, UA 1.014     Glucose, UA Negative     Ketones, UA Negative     Bilirubin, UA Negative     Blood, UA Negative     Protein, UA Negative     Leuk Esterase, UA Negative     Nitrite, UA Negative     Urobilinogen, UA 0.2 E.U./dL    Narrative:      Urine microscopic not indicated.    COVID-19 and FLU A/B PCR - Swab, Nasopharynx [[id number]]  (Normal) Collected: 12/08/22 0945    Specimen: Swab from Nasopharynx Updated: 12/08/22 1016     COVID19 Not Detected     Influenza A PCR Not Detected     Influenza B PCR Not Detected    Narrative:      Fact sheet for providers: https://www.fda.gov/media/951250/download    Fact sheet for patients: https://www.fda.gov/media/065604/download    Test performed by PCR.          Imaging Results (Last 24 Hours)     Procedure Component Value Units Date/Time    CT Angiogram Chest [[id number]] Collected: 12/08/22 0942     Updated: 12/08/22 1001    Narrative:      CT ANGIOGRAM CHEST-     Radiation dose reduction techniques were utilized, including automated  exposure control and exposure modulation based on body size. CT scan of  the chest performed with intravenous contrast, pulmonary embolus  protocol to include coronal, sagittal and three-dimensional  reconstruction.     CLINICAL INFORMATION: Right-sided upper abdomen and chest pain.     FINDINGS: No pulmonary  embolus. Ectasia of the thoracic aorta,  dilatation of the ascending portion, measuring 4.3 cm in diameter. No  indication of dissection. The esophagus is satisfactory in appearance.  Cardiac size upper normal, no pericardial abnormality. No mediastinal or  hilar mass/adenopathy. There are several small mediastinal lymph nodes  seen. There is a 2 cm diverticulum incidentally noted within the right  upper quadrant. No atypical features. Linear areas of scarring versus  discoid atelectasis along each costophrenic sulcus. No pleural effusion,  suspicious pulmonary lesion or infiltrate/consolidation.     CONCLUSION:  1. No acute pulmonary process is demonstrated, specifically no pulmonary  embolus.  2. Dilatation of the ascending thoracic aorta, diameter is 4.3 cm.  3. Typical-appearing duodenal diverticulum noted incidentally.              This report was finalized on 12/8/2022 9:58 AM by Dr. Tomás Pham M.D.                 ECG 12 Lead Chest Pain   Final Result   HEART RATE= 55  bpm   RR Interval= 1091  ms   MS Interval= 174  ms   P Horizontal Axis= -63  deg   P Front Axis= -14  deg   QRSD Interval= 113  ms   QT Interval= 448  ms   QRS Axis= 14  deg   T Wave Axis= 89  deg   - ABNORMAL ECG -   Sinus rhythm   Nonspecific T abnormalities, lateral leads   When compared with ECG of 02-Dec-2022 11:05:14,   No significant change   Electronically Signed By: Hima Flores ([facility]) 08-Dec-2022 12:58:25   Date and Time of Study: 2022-12-08 07:23:54          Assessment/Plan     Active Hospital Problems    Diagnosis  POA   • **Generalized abdominal pain [R10.84]  Yes   • History of esophagitis [Z87.19]  Not Applicable   • Lepe's esophagus without dysplasia [K22.70]  Yes   • GERD (gastroesophageal reflux disease) [K21.9]  Yes   • Diverticulosis [K57.90]  Yes   • Mixed hyperlipidemia [E78.2]  Yes   • Essential hypertension [I10]  Yes   • Mixed anxiety depressive disorder [F41.8]  Yes      Resolved Hospital Problems   No resolved  problems to display.       Mr. Coreas is a 57 y.o. with a history of Lepe's esophagus that is not on PPI who presents with abdominal pain, black diarrhea intermittent, nausea and vomiting.  CT abdomen pelvis with diverticulosis and diastasis of the rectus muscle but no other acute process.  Gallbladder unremarkable and no pancreatitis but lipase is mildly elevated.  He does not have right upper quadrant pain.    Possible GI bleed/atypical chest pain/abdominal pain  Consult gastroenterology.  He does have a history of PUD, Lepe's esophagus and states he is not on a PPI.  He has had a slight decline in hemoglobin since last seen and reports black watery stool. In addition admits to frequent alcohol use  Start IV Protonix drip and carafate and consult gastroenterology  Trend Hgb q12hr and monitor stool for overt bleeding. NPO at midnight. Add carafate.  He does not meet criteria for pancreatitis but is at risk with ETOH     Atypical Chest pain   CTA without PE. EKG without acute ischemic change. Normal troponin. Likely related to above.     HTN   BP elevated.  Given labetalol in the ER.  Will resume home antihypertensive agents once med list reviewed by nurse.    · I discussed the patient's findings and my recommendations with patient, nursing staff and ED provider.    VTE Prophylaxis - SCDs.  Code Status - Full code.       JANEEN Snow  [facility]  12/08/22  15:23 EST    Electronically signed by Alden Choudhury MD at 12/08/22 8840          Emergency Department Notes      Pamela Villanueva, RN at 12/08/22 4226        Pt arrived by car reporting pain in esophagus and abd. Pt reports that he was seen here within the last week and the pain feels the same as the first visit. Pt has been taking medications as prescribed. Pt is alert and oriented as well as ambulatory in triage.     Electronically signed by Pamela Villanueva, RN at 12/08/22 6835     Leonie Luo RN at  12/08/22 0657        Pt is aware of need for urine sample. Call light in reach.    Electronically signed by Leonie Luo RN at 12/08/22 0657     Leonie Luo RN at 12/08/22 0706        MD notified of pt's current bp. MD in to see pt.    Electronically signed by Leonie Luo RN at 12/08/22 0706     Juan Ramon Flowers MD at 12/08/22 0710           EMERGENCY DEPARTMENT ENCOUNTER    Room Number:  09/09  Date seen:  12/8/2022  PCP: Akash Rodriguez Jr., DO  Historian: Patient      HPI:  Chief Complaint: Abdomen pain and chest pain, blood in stool  A complete HPI/ROS/PMH/PSH/SH/FH are unobtainable due to: N/A  Context: Flako Coreas is a 57 y.o. male who presents to the ED c/o persistent pain in the upper abdomen and right-sided chest.  Patient attributes his pain to his esophagus.  He says he has had pain like this in the past which has been gastrointestinal in nature.  He was seen here 1 week ago for similar presentation and had a work-up for abdominal pain and he was discharged home with a plan for symptomatic management.  He followed up with his PCP on Tuesday.  Today, the pain seemed to be increasing again and he has seen some blood in his stools recently so he returned here.  He has some nausea but no vomiting.  He has also had some new cough and congestion and flulike symptoms developed this week.  Patient takes Protonix regularly as well as sucralfate.  He has been taking his blood pressure medications as directed.      PAST MEDICAL HISTORY  Active Ambulatory Problems     Diagnosis Date Noted   • Mixed anxiety depressive disorder 12/11/2012   • Mixed hyperlipidemia 06/26/2017   • Essential hypertension 06/26/2017   • Diverticulosis 07/27/2018   • Nodule of spleen 06/27/2018   • Chronic bilateral lower abdominal pain 08/10/2018   • Lepe's esophagus without dysplasia 10/10/2018   • GERD (gastroesophageal reflux disease) 09/18/2018     Resolved Ambulatory Problems     Diagnosis Date Noted    • Abdominal pain 06/20/2018     Past Medical History:   Diagnosis Date   • ADHD (attention deficit hyperactivity disorder)    • Anxiety    • Benign prostatic hyperplasia Surgery in 12/2021   • Clotting disorder (HCC) Intestinal   • Depression    • Diverticulitis    • Duodenitis    • Enteritis    • Failure to thrive (child)    • Family history of blood clots    • Hyperlipidemia    • Hypertension    • Hypertensive emergency    • Low testosterone    • Microcytosis    • Pancreatitis    • Pneumonia    • PONV (postoperative nausea and vomiting)    • Seasonal affective disorder (HCC)    • Shoulder injury    • Unexplained weight loss          PAST SURGICAL HISTORY  Past Surgical History:   Procedure Laterality Date   • COLON SURGERY     • COLONOSCOPY     • FRACTURE SURGERY  1980    for broken arm   • FRACTURE SURGERY      for broken hand   • INCISION AND DRAINAGE ABSCESS  2015    anal   • PROSTATE SURGERY     • SMALL INTESTINE SURGERY     • TONSILLECTOMY           FAMILY HISTORY  Family History   Problem Relation Age of Onset   • Stroke Mother    • Stroke Father          SOCIAL HISTORY  Social History     Socioeconomic History   • Marital status: Single   Tobacco Use   • Smoking status: Never   • Smokeless tobacco: Never   Vaping Use   • Vaping Use: Never used   Substance and Sexual Activity   • Alcohol use: Yes     Alcohol/week: 20.0 standard drinks     Types: 10 Cans of beer, 10 Shots of liquor per week     Comment: pt states very rarely   • Drug use: No     Comment: Pt states he may have smoked marijuana 3 weeks ago (stated on 11-07-18)   • Sexual activity: Not Currently     Partners: Female         ALLERGIES  Patient has no known allergies.        REVIEW OF SYSTEMS  Review of Systems   Constitutional: Positive for activity change, chills and fatigue. Negative for fever.   HENT: Positive for congestion. Negative for trouble swallowing.    Eyes: Negative for pain and visual disturbance.   Respiratory: Positive for cough  and shortness of breath.    Cardiovascular: Positive for chest pain.   Gastrointestinal: Positive for abdominal pain and nausea.   Genitourinary: Negative for dysuria and frequency.   Skin: Negative for color change and rash.   Neurological: Negative for syncope and headaches.   All other systems reviewed and are negative.      PHYSICAL EXAM  ED Triage Vitals [12/08/22 0436]   Temp Heart Rate Resp BP SpO2   96.2 °F (35.7 °C) 65 16 (!) 191/128 96 %      Temp src Heart Rate Source Patient Position BP Location FiO2 (%)   Tympanic Monitor Standing Right arm --         GENERAL: Pleasant gentleman, resting calmly in the bed, appears uncomfortable but no acute distress  HENT: nares patent, normocephalic and atraumatic, moist mucous membrane  EYES: no scleral icterus, EOMI, normal conjunctiva  CV: regular rhythm, normal rate, no murmur  RESPIRATORY: normal effort, lungs clear to auscultation bilaterally  ABDOMEN: soft, mild to moderate tenderness in the epigastrium as well as bilateral upper quadrants but no peritoneal features.  No masses.  Rectal exam: Normal tone, no masses.  No gross blood evident.  Light brown to rust colored stool noted.  Guaiac stain is positive.  MUSCULOSKELETAL: no deformity, no edema or asymmetry  NEURO: alert, moves all extremities, follows commands, no focal deficits  PSYCH:  calm, cooperative  SKIN: warm, dry    Vital signs and nursing notes reviewed.          LAB RESULTS  Recent Results (from the past 24 hour(s))   Comprehensive Metabolic Panel    Collection Time: 12/08/22  6:53 AM    Specimen: Blood   Result Value Ref Range    Glucose 110 (H) 65 - 99 mg/dL    BUN 12 6 - 20 mg/dL    Creatinine 0.84 0.76 - 1.27 mg/dL    Sodium 138 136 - 145 mmol/L    Potassium 3.6 3.5 - 5.2 mmol/L    Chloride 101 98 - 107 mmol/L    CO2 26.0 22.0 - 29.0 mmol/L    Calcium 8.8 8.6 - 10.5 mg/dL    Total Protein 6.7 6.0 - 8.5 g/dL    Albumin 4.20 3.50 - 5.20 g/dL    ALT (SGPT) 20 1 - 41 U/L    AST (SGOT) 17 1 - 40  U/L    Alkaline Phosphatase 74 39 - 117 U/L    Total Bilirubin 0.5 0.0 - 1.2 mg/dL    Globulin 2.5 gm/dL    A/G Ratio 1.7 g/dL    BUN/Creatinine Ratio 14.3 7.0 - 25.0    Anion Gap 11.0 5.0 - 15.0 mmol/L    eGFR 103.0 >60.0 mL/min/1.73   Lipase    Collection Time: 12/08/22  6:53 AM    Specimen: Blood   Result Value Ref Range    Lipase 220 (H) 13 - 60 U/L   CBC Auto Differential    Collection Time: 12/08/22  6:53 AM    Specimen: Blood   Result Value Ref Range    WBC 6.27 3.40 - 10.80 10*3/mm3    RBC 4.24 4.14 - 5.80 10*6/mm3    Hemoglobin 12.1 (L) 13.0 - 17.7 g/dL    Hematocrit 35.4 (L) 37.5 - 51.0 %    MCV 83.5 79.0 - 97.0 fL    MCH 28.5 26.6 - 33.0 pg    MCHC 34.2 31.5 - 35.7 g/dL    RDW 14.7 12.3 - 15.4 %    RDW-SD 44.9 37.0 - 54.0 fl    MPV 9.8 6.0 - 12.0 fL    Platelets 186 140 - 450 10*3/mm3    Neutrophil % 60.1 42.7 - 76.0 %    Lymphocyte % 22.2 19.6 - 45.3 %    Monocyte % 8.0 5.0 - 12.0 %    Eosinophil % 8.8 (H) 0.3 - 6.2 %    Basophil % 0.6 0.0 - 1.5 %    Immature Grans % 0.3 0.0 - 0.5 %    Neutrophils, Absolute 3.77 1.70 - 7.00 10*3/mm3    Lymphocytes, Absolute 1.39 0.70 - 3.10 10*3/mm3    Monocytes, Absolute 0.50 0.10 - 0.90 10*3/mm3    Eosinophils, Absolute 0.55 (H) 0.00 - 0.40 10*3/mm3    Basophils, Absolute 0.04 0.00 - 0.20 10*3/mm3    Immature Grans, Absolute 0.02 0.00 - 0.05 10*3/mm3    nRBC 0.2 0.0 - 0.2 /100 WBC   Troponin    Collection Time: 12/08/22  6:53 AM    Specimen: Blood   Result Value Ref Range    Troponin T <0.010 0.000 - 0.030 ng/mL   ECG 12 Lead Chest Pain    Collection Time: 12/08/22  7:23 AM   Result Value Ref Range    QT Interval 448 ms   Urinalysis With Microscopic If Indicated (No Culture) - Urine, Clean Catch    Collection Time: 12/08/22  9:18 AM    Specimen: Urine, Clean Catch   Result Value Ref Range    Color, UA Yellow Yellow, Straw    Appearance, UA Turbid (A) Clear    pH, UA 7.0 5.0 - 8.0    Specific Gravity, UA 1.014 1.005 - 1.030    Glucose, UA Negative Negative    Ketones,  UA Negative Negative    Bilirubin, UA Negative Negative    Blood, UA Negative Negative    Protein, UA Negative Negative    Leuk Esterase, UA Negative Negative    Nitrite, UA Negative Negative    Urobilinogen, UA 0.2 E.U./dL 0.2 - 1.0 E.U./dL   COVID-19 and FLU A/B PCR - Swab, Nasopharynx    Collection Time: 12/08/22  9:45 AM    Specimen: Nasopharynx; Swab   Result Value Ref Range    COVID19 Not Detected Not Detected - Ref. Range    Influenza A PCR Not Detected Not Detected    Influenza B PCR Not Detected Not Detected       Ordered the above labs and reviewed the results.        RADIOLOGY  CT Angiogram Chest    Result Date: 12/8/2022  CT ANGIOGRAM CHEST-  Radiation dose reduction techniques were utilized, including automated exposure control and exposure modulation based on body size. CT scan of the chest performed with intravenous contrast, pulmonary embolus protocol to include coronal, sagittal and three-dimensional reconstruction.  CLINICAL INFORMATION: Right-sided upper abdomen and chest pain.  FINDINGS: No pulmonary embolus. Ectasia of the thoracic aorta, dilatation of the ascending portion, measuring 4.3 cm in diameter. No indication of dissection. The esophagus is satisfactory in appearance. Cardiac size upper normal, no pericardial abnormality. No mediastinal or hilar mass/adenopathy. There are several small mediastinal lymph nodes seen. There is a 2 cm diverticulum incidentally noted within the right upper quadrant. No atypical features. Linear areas of scarring versus discoid atelectasis along each costophrenic sulcus. No pleural effusion, suspicious pulmonary lesion or infiltrate/consolidation.  CONCLUSION: 1. No acute pulmonary process is demonstrated, specifically no pulmonary embolus. 2. Dilatation of the ascending thoracic aorta, diameter is 4.3 cm. 3. Typical-appearing duodenal diverticulum noted incidentally.     This report was finalized on 12/8/2022 9:58 AM by Dr. Tomás Pham M.D.        Ordered  the above noted radiological studies. Reviewed by me in PACS.          PROCEDURES  Procedures    EKG           EKG time/Interp time: 0723/0726  Rhythm/Rate: Sinus rhythm, 55 bpm  P waves and AL: Present, 174 ms  QRS, axis: 113 ms, normal axis  ST and T waves: No ST segment elevations evident    Independently interpreted by me contemporaneously with treatment        MEDICATIONS GIVEN IN ER  Medications   sodium chloride 0.9 % flush 10 mL (has no administration in time range)   sodium chloride 0.9 % flush 10 mL (has no administration in time range)   HYDROmorphone (DILAUDID) injection 1 mg (has no administration in time range)   pantoprazole (PROTONIX) injection 80 mg (has no administration in time range)   labetalol (NORMODYNE,TRANDATE) injection 10 mg (has no administration in time range)   HYDROmorphone (DILAUDID) injection 0.5 mg (0.5 mg Intravenous Given 12/8/22 0716)   ondansetron (ZOFRAN) injection 4 mg (4 mg Intravenous Given 12/8/22 0716)   iopamidol (ISOVUE-370) 76 % injection 100 mL (95 mL Intravenous Given 12/8/22 0905)                   MEDICAL DECISION MAKING, PROGRESS, and CONSULTS    All labs have been independently reviewed by me.  All radiology studies have been reviewed by me and discussed with radiologist dictating the report.   EKG's independently viewed and interpreted by me.  Discussion below represents my analysis of pertinent findings related to patient's condition, differential diagnosis, treatment plan and final disposition.      My differential diagnosis for chest pain includes but is not limited to:  Muscle strain, costochondritis, myositis, pleurisy, rib fracture, intercostal neuritis, herpes zoster, tumor, myocardial infarction, coronary syndrome, unstable angina, angina, aortic dissection, mitral valve prolapse, pericarditis, palpitations, pulmonary embolus, pneumonia, pneumothorax, lung cancer, GERD, esophagitis, esophageal spasm    My differential diagnosis for abdominal pain includes  "but is not limited to:  Gastritis, gastroenteritis, peptic ulcer disease, GERD, esophageal perforation, acute appendicitis, mesenteric adenitis, Meckel’s diverticulum, epiploic appendagitis, diverticulitis, colon cancer, ulcerative colitis, Crohn’s disease, intussusception, small bowel obstruction, adhesions, ischemic bowel, perforated viscus, ileus, obstipation, biliary colic, cholecystitis, cholelithiasis, Pablo-Joaquin Dashawn, hepatitis, pancreatitis, common bile duct obstruction, cholangitis, bile leak, splenic trauma, splenic rupture, splenic infarction, splenic abscess, abdominal abscess, ascites, spontaneous bacterial peritonitis, hernia, UTI, cystitis, prostatitis, ureterolithiasis, urinary obstruction, AAA, myocardial infarction, pneumonia, cancer, porphyria, DKA, medications, sickle cell, viral syndrome, zoster      ED Course as of 12/08/22 1052   Thu Dec 08, 2022   0900 Lipase(!): 220 [SOPHIE]   0900 Patient continues to complain of \"abdominal pain\" but when I asked him to point where it hurts the most, he seems to indicate his right-sided chest and thoracic region.  Therefore I am checking a CTA of the chest today.  He just had a CT of the abdomen in the past week as well as basic labs.  We will recheck the basic labs including lipase today to see if there is any significant change.  Blood pressure was quite elevated on arrival but some of that may be due to pain. [SOPHIE]   0944 No PE on the CT angiogram chest [SOPHIE]   1023 Spoke with patient's PCP, Dr. Rodriguez on the phone.  He request that we admit patient to [facility] and pursue GI consultation.  Indicates patient has a rather extensive GI history with known Lepe's esophagus and history of peptic ulcer disease. [SOPHIE]   1024 Hemoglobin(!): 12.1  Hemoglobin noted to have dropped in comparison to recent labs [SOPHIE]   1052 Spoke with Dr. Choudhury from [facility] and he agrees to accept patient for continued care.  He request that we give 1 dose of labetalol for patient's hypertension " which I am happy to do.  Also treating the abdominal pain component which I think will probably help his blood pressure as well. [SOPHIE]      ED Course User Index  [SOPHIE] Juan Ramon Flowers MD              Patient was placed in face mask during triage.  Patient was wearing face mask throughout encounter.  I wore personal protective equipment throughout the encounter.  Hand hygiene was performed before and after patient encounter.     DIAGNOSIS  Final diagnoses:   Generalized abdominal pain   Gastrointestinal hemorrhage, unspecified gastrointestinal hemorrhage type   Elevated lipase   Anemia, unspecified type   Hypertension, unspecified type         DISPOSITION  Admit [facility]            Latest Documented Vital Signs:  As of 10:52 EST  BP- (!) 196/122 HR- 74 Temp- 96.2 °F (35.7 °C) (Tympanic) O2 sat- 96%        --    Please note that portions of this were completed with a voice recognition program.          Juan Ramon Flowers MD  12/08/22 1052      Electronically signed by Juan Ramon Flowers MD at 12/08/22 1052     Leighann Lux RN at 12/08/22 0843        This RN wearing all appropriate PPE during patient encounter. Hand hygiene performed before and during entering room.      Electronically signed by Leighann Lux RN at 12/08/22 0843     Leighann Lux RN at 12/08/22 1039        MD Lionel aware of pt's increase in B/P.     Electronically signed by Leighann Lux RN at 12/08/22 1040     Leighann Lux RN at 12/08/22 1121        This RN wearing all appropriate PPE during patient encounter. Hand hygiene performed before and during entering room.      Electronically signed by Leighann Lux RN at 12/08/22 1121     Leighann Lux RN at 12/08/22 1420        This RN wearing all appropriate PPE during patient encounter. Hand hygiene performed before and during entering room.      Electronically signed by Leighann Lux RN at 12/08/22 1420     Leighann Lux  RN at 12/08/22 1422          Nursing report ED to floor  Flako Coreas  57 y.o.  male    HPI :   Chief Complaint   Patient presents with   • Abdominal Pain       Admitting doctor:   Alden Choudhury MD    Admitting diagnosis:   The primary encounter diagnosis was Generalized abdominal pain. Diagnoses of Gastrointestinal hemorrhage, unspecified gastrointestinal hemorrhage type, Elevated lipase, Anemia, unspecified type, and Hypertension, unspecified type were also pertinent to this visit.    Code status:   Current Code Status       Date Active Code Status Order ID Comments User Context       12/8/2022 1407 CPR (Attempt to Resuscitate) [id number]  Viviane Thakkar, JANEEN ED        Question Answer    Code Status (Patient has no pulse and is not breathing) CPR (Attempt to Resuscitate)    Medical Interventions (Patient has pulse or is breathing) Full Support    Level Of Support Discussed With Patient                    Allergies:   Patient has no known allergies.    Isolation:   No active isolations    Intake and Output  No intake or output data in the 24 hours ending 12/08/22 1422    Weight:       12/08/22  0436   Weight: 102 kg (225 lb)       Most recent vitals:   Vitals:    12/08/22 1001 12/08/22 1101 12/08/22 1231 12/08/22 1331   BP: (!) 196/122 (!) 189/113 (!) 172/121 (!) 155/106   BP Location:       Patient Position:       Pulse: 74 63 55 60   Resp:       Temp:       TempSrc:       SpO2: 96% 96% 91%    Weight:       Height:           Active LDAs/IV Access:   Lines, Drains & Airways       Active LDAs       Name Placement date Placement time Site Days    Peripheral IV 12/08/22 0652 Left;Posterior Hand 12/08/22  0652  Hand  less than 1    Peripheral IV 12/08/22 0841 Left Antecubital 12/08/22  0841  Antecubital  less than 1                    Labs (abnormal labs have a star):   Labs Reviewed   COMPREHENSIVE METABOLIC PANEL - Abnormal; Notable for the following components:       Result Value    Glucose 110 (*)      All other components within normal limits    Narrative:     GFR Normal >60  Chronic Kidney Disease <60  Kidney Failure <15     LIPASE - Abnormal; Notable for the following components:    Lipase 220 (*)     All other components within normal limits   URINALYSIS W/ MICROSCOPIC IF INDICATED (NO CULTURE) - Abnormal; Notable for the following components:    Appearance, UA Turbid (*)     All other components within normal limits    Narrative:     Urine microscopic not indicated.   CBC WITH AUTO DIFFERENTIAL - Abnormal; Notable for the following components:    Hemoglobin 12.1 (*)     Hematocrit 35.4 (*)     Eosinophil % 8.8 (*)     Eosinophils, Absolute 0.55 (*)     All other components within normal limits   COVID-19 AND FLU A/B, NP SWAB IN TRANSPORT MEDIA 8-12 HR TAT - Normal    Narrative:     Fact sheet for providers: https://www.fda.gov/media/441799/download    Fact sheet for patients: https://www.fda.gov/media/896176/download    Test performed by PCR.   TROPONIN (IN-HOUSE) - Normal    Narrative:     Troponin T Reference Range:  <= 0.03 ng/mL-   Negative for AMI  >0.03 ng/mL-     Abnormal for myocardial necrosis.  Clinicians would have to utilize clinical acumen, EKG, Troponin and serial changes to determine if it is an Acute Myocardial Infarction or myocardial injury due to an underlying chronic condition.       Results may be falsely decreased if patient taking Biotin.     CBC AND DIFFERENTIAL    Narrative:     The following orders were created for panel order CBC & Differential.  Procedure                               Abnormality         Status                     ---------                               -----------         ------                     CBC Auto Differential[[id number]]        Abnormal            Final result                 Please view results for these tests on the individual orders.       EKG:   ECG 12 Lead Chest Pain   Final Result   HEART RATE= 55  bpm   RR Interval= 1091  ms   TN Interval= 174  ms    P Horizontal Axis= -63  deg   P Front Axis= -14  deg   QRSD Interval= 113  ms   QT Interval= 448  ms   QRS Axis= 14  deg   T Wave Axis= 89  deg   - ABNORMAL ECG -   Sinus rhythm   Nonspecific T abnormalities, lateral leads   When compared with ECG of 02-Dec-2022 11:05:14,   No significant change   Electronically Signed By: [facility] 08-Dec-2022 12:58:25   Date and Time of Study: 2022-12-08 07:23:54          Meds given in ED:   Medications   sodium chloride 0.9 % flush 10 mL (has no administration in time range)   sodium chloride 0.9 % flush 10 mL (has no administration in time range)   ondansetron (ZOFRAN) injection 4 mg (4 mg Intravenous Given 12/8/22 1417)   acetaminophen (TYLENOL) tablet 650 mg (has no administration in time range)     Or   acetaminophen (TYLENOL) 160 MG/5ML solution 650 mg (has no administration in time range)     Or   acetaminophen (TYLENOL) suppository 650 mg (has no administration in time range)   HYDROmorphone (DILAUDID) injection 0.5 mg (0.5 mg Intravenous Given 12/8/22 1417)   HYDROmorphone (DILAUDID) injection 0.5 mg (0.5 mg Intravenous Given 12/8/22 0716)   ondansetron (ZOFRAN) injection 4 mg (4 mg Intravenous Given 12/8/22 0716)   iopamidol (ISOVUE-370) 76 % injection 100 mL (95 mL Intravenous Given 12/8/22 0905)   HYDROmorphone (DILAUDID) injection 1 mg (1 mg Intravenous Given 12/8/22 1107)   pantoprazole (PROTONIX) injection 80 mg (80 mg Intravenous Given 12/8/22 1112)   labetalol (NORMODYNE,TRANDATE) injection 10 mg (10 mg Intravenous Given 12/8/22 1112)       Imaging results:  No radiology results for the last day    Ambulatory status:   - up x's 1 assist    Social issues:   Social History     Socioeconomic History   • Marital status: Single   Tobacco Use   • Smoking status: Never   • Smokeless tobacco: Never   Vaping Use   • Vaping Use: Never used   Substance and Sexual Activity   • Alcohol use: Yes     Alcohol/week: 20.0 standard drinks     Types: 10 Cans of beer, 10  Shots of liquor per week     Comment: pt states very rarely   • Drug use: No     Comment: Pt states he may have smoked marijuana 3 weeks ago (stated on 11-07-18)   • Sexual activity: Not Currently     Partners: Female       NIH Stroke Scale:         Leighann Lux RN  12/08/22 14:22 EST          Electronically signed by Leighann Lux RN at 12/08/22 1422       Oxygen Therapy (since admission)     Date/Time SpO2 Device (Oxygen Therapy) Flow (L/min) Oxygen Concentration (%) ETCO2 (mmHg)    12/09/22 1155 -- room air -- -- --    12/09/22 1120 -- room air -- -- --    12/09/22 1000 -- room air -- -- --    12/09/22 0740 -- room air -- -- --    12/09/22 0719 90 -- -- -- --    12/09/22 0033 -- room air -- -- --    12/08/22 2359 95 room air -- -- --    12/08/22 2011 93 room air -- -- --    12/08/22 1643 92 room air -- -- --    12/08/22 1531 91 -- -- -- --    12/08/22 1231 91 -- -- -- --    12/08/22 1101 96 -- -- -- --    12/08/22 1001 96 -- -- -- --    12/08/22 0831 94 -- -- -- --    12/08/22 0801 95 -- -- -- --    12/08/22 0701 97 -- -- -- --    12/08/22 0643 96 -- -- -- --    12/08/22 0436 96 room air -- -- --        Intake & Output (last 3 days)       12/06 0701 12/07 0700 12/07 0701 12/08 0700 12/08 0701 12/09 0700 12/09 0701  12/10 0700    Urine (mL/kg/hr)   675 (0.3) 250 (0.5)    Total Output   675 250    Net   -675 -250                 Lines, Drains & Airways     Active LDAs     Name Placement date Placement time Site Days    Peripheral IV 12/08/22 0652 Left;Posterior Hand 12/08/22  0652  Hand  1    Peripheral IV 12/08/22 0841 Left Antecubital 12/08/22  0841  Antecubital  1          Inactive LDAs     None                  Medication Administration Report for Flako Coreas as of 12/09/22 1214   Legend:    Given Hold Not Given Due Canceled Entry Other Actions    Time Time (Time) Time  Time-Action       Discontinued     Completed     Future     MAR Hold     Linked           Medications 12/07/22  12/08/22 12/09/22    acetaminophen (TYLENOL) tablet 650 mg  Dose: 650 mg  Freq: Every 4 Hours PRN Route: PO  PRN Reason: Mild Pain  Start: 12/08/22 1205   Admin Instructions:   Based on patient request - if ordered for moderate or severe pain, provider allows for administration of a medication prescribed for a lower pain scale.  Do not exceed 4 grams of acetaminophen in a 24 hr period. Max dose of 2gm for AST/ALT greater than 120 units/L    If given for fever, use fever parameter: fever greater than 100.4 °F.    If given for pain, use the following pain scale:   Mild Pain = Pain Score of 1-3, CPOT 1-2  Moderate Pain = Pain Score of 4-6, CPOT 3-4  Severe Pain = Pain Score of 7-10, CPOT 5-8         Or  acetaminophen (TYLENOL) 160 MG/5ML solution 650 mg  Dose: 650 mg  Freq: Every 4 Hours PRN Route: PO  PRN Reason: Mild Pain  Start: 12/08/22 1205   Admin Instructions:   Based on patient request - if ordered for moderate or severe pain, provider allows for administration of a medication prescribed for a lower pain scale.  Do not exceed 4 grams of acetaminophen in a 24 hr period. Max dose of 2gm for AST/ALT greater than 120 units/L    If given for fever, use fever parameter: fever greater than 100.4 °F.    If given for pain, use the following pain scale:   Mild Pain = Pain Score of 1-3, CPOT 1-2  Moderate Pain = Pain Score of 4-6, CPOT 3-4  Severe Pain = Pain Score of 7-10, CPOT 5-8         Or  acetaminophen (TYLENOL) suppository 650 mg  Dose: 650 mg  Freq: Every 4 Hours PRN Route: RE  PRN Reason: Mild Pain  Start: 12/08/22 1205   Admin Instructions:   Based on patient request - if ordered for moderate or severe pain, provider allows for administration of a medication prescribed for a lower pain scale.  Do not exceed 4 grams of acetaminophen in a 24 hr period. Max dose of 2gm for AST/ALT greater than 120 units/L    If given for fever, use fever parameter: fever greater than 100.4 °F.    If given for pain, use the  following pain scale:   Mild Pain = Pain Score of 1-3, CPOT 1-2  Moderate Pain = Pain Score of 4-6, CPOT 3-4  Severe Pain = Pain Score of 7-10, CPOT 5-8          amLODIPine (NORVASC) tablet 10 mg  Dose: 10 mg  Freq: Daily Route: PO  Start: 12/08/22 1930   Admin Instructions:   Caution: Look alike/sound alike drug alert. Avoid grapefruit juice.     2020-Given          (0829)-Not Given             hydrALAZINE (APRESOLINE) tablet 10 mg  Dose: 10 mg  Freq: Every 4 Hours PRN Route: PO  PRN Comment: give if diastolic greater than 110, systolic greater than 190  Start: 12/09/22 0004   Admin Instructions:   Caution: Look alike/sound alike drug alert          HYDROmorphone (DILAUDID) injection 0.5 mg  Dose: 0.5 mg  Freq: Every 3 Hours PRN Route: IV  PRN Reason: Severe Pain  Start: 12/08/22 1210   Admin Instructions:   Based on patient request - if ordered for moderate or severe pain, provider allows for administration of a medication prescribed for a lower pain scale.  If given for pain, use the following pain scale:  Mild Pain = Pain Score of 1-3, CPOT 1-2  Moderate Pain = Pain Score of 4-6, CPOT 3-4  Severe Pain = Pain Score of 7-10, CPOT 5-8     1417-Given     1732-Given     2020-Given        0315-Given     0742-Given     1121-Given           lisinopril (PRINIVIL,ZESTRIL) tablet 20 mg  Dose: 20 mg  Freq: 2 Times Daily Route: PO  Start: 12/08/22 2100 2020-Given          (0829)-Not Given     2100            ondansetron (ZOFRAN) injection 4 mg  Dose: 4 mg  Freq: Every 6 Hours PRN Route: IV  PRN Reasons: Nausea,Vomiting  Start: 12/08/22 1205   Admin Instructions:   If BOTH ondansetron (ZOFRAN) and promethazine (PHENERGAN) are ordered use ondansetron first and THEN promethazine IF ondansetron is ineffective.     1417-Given     1903-Given     2330-Given        0741-Given             ondansetron (ZOFRAN) tablet 4 mg  Dose: 4 mg  Freq: 4 Times Daily PRN Route: PO  PRN Reasons: Nausea,Vomiting  Start: 12/08/22 1748           pantoprazole (PROTONIX) 40 mg in 100 mL NS (VTB)  Rate: 20 mL/hr Dose: 8 mg/hr  Freq: Continuous Route: IV  Indications of Use: Gastrointestinal (GI) Bleed  Start: 12/08/22 1535     1732-New Bag     1738-Canceled Entry     2237-New Bag        0308-New Bag     0741-New Bag            rosuvastatin (CRESTOR) tablet 10 mg  Dose: 10 mg  Freq: Nightly Route: PO  Start: 12/08/22 2100   Admin Instructions:   Avoid grapefruit juice.     2020-Given          2100             sodium chloride 0.9 % flush 10 mL  Dose: 10 mL  Freq: As Needed Route: IV  PRN Reason: Line Care  Start: 12/08/22 0708          sodium chloride 0.9 % flush 10 mL  Dose: 10 mL  Freq: As Needed Route: IV  PRN Reason: Line Care  Start: 12/08/22 0617          sucralfate (CARAFATE) tablet 1 g  Dose: 1 g  Freq: 4 Times Daily Before Meals & Nightly Route: PO  Start: 12/08/22 1730   Admin Instructions:   If for oral administration, take on empty stomach.  If for rectal enema, dissolve 1 tablet in 10 ml water. Retain enema for as long as possible or for at least 5 minutes.  For nasogastric or slurry administration:   1. Remove the cap and plunger from a 60 mL syringe   2. Place the sucralfate tablet inside the syringe   3. Replace the plunger so that minimal airspace exists around the tablet   4. Draw up approximately 20 mL water into the syringe   5. Replace the syringe cap   6. Allow the syringe to stand for ~5 minutes, shaking occasionally   7. Shake the suspension and administer directly from the syringe into the tube   **Flush tube before and after administration**     (1730)-Not Given     2020-Given         (0829)-Not Given     1130 1730 2100             vilazodone (VIIBRYD) tablet 40 mg  Dose: 40 mg  Freq: Daily Route: PO  Start: 12/08/22 1930   Admin Instructions:   Take with food     2020-Given          (0830)-Not Given            Completed Medications  Medications 12/07/22 12/08/22 12/09/22       HYDROmorphone (DILAUDID) injection 0.5 mg  Dose:  0.5 mg  Freq: Once Route: IV  Start: 12/08/22 0711   End: 12/08/22 0716   Admin Instructions:   Based on patient request - if ordered for moderate or severe pain, provider allows for administration of a medication prescribed for a lower pain scale.  If given for pain, use the following pain scale:  Mild Pain = Pain Score of 1-3, CPOT 1-2  Moderate Pain = Pain Score of 4-6, CPOT 3-4  Severe Pain = Pain Score of 7-10, CPOT 5-8     0716-Given              HYDROmorphone (DILAUDID) injection 1 mg  Dose: 1 mg  Freq: Once Route: IV  Start: 12/08/22 1041   End: 12/08/22 1107   Admin Instructions:   Based on patient request - if ordered for moderate or severe pain, provider allows for administration of a medication prescribed for a lower pain scale.    Caution: Look alike/sound alike drug alert    If given for pain, use the following pain scale:  Mild Pain = Pain Score of 1-3, CPOT 1-2  Moderate Pain = Pain Score of 4-6, CPOT 3-4  Severe Pain = Pain Score of 7-10, CPOT 5-8     1107-Given     1109             iopamidol (ISOVUE-370) 76 % injection 100 mL  Dose: 100 mL  Freq: Once in Imaging Route: IV  Start: 12/08/22 0906   End: 12/08/22 0905     0905-Given              labetalol (NORMODYNE,TRANDATE) injection 10 mg  Dose: 10 mg  Freq: Once Route: IV  Start: 12/08/22 1054   End: 12/08/22 1112   Admin Instructions:   As ordered  Give IV Push over 2 minutes.     1112-Given              ondansetron (ZOFRAN) injection 4 mg  Dose: 4 mg  Freq: Once Route: IV  Start: 12/08/22 0711   End: 12/08/22 0716     0716-Given              pantoprazole (PROTONIX) injection 80 mg  Dose: 80 mg  Freq: Once Route: IV  Indications of Use: Gastrointestinal (GI) Bleed  Start: 12/08/22 1053   End: 12/08/22 1112   Admin Instructions:   Dilute with 10 mL of 0.9% NaCl and give IV push over 2 minutes.     1112-Given

## 2022-12-09 NOTE — PROGRESS NOTES
[facility]     LOS: 1 day     Subjective:    CC:Abdominal Pain    DIET:  Diet Order   Procedures   • NPO Diet NPO Type: Strict NPO   • Diet: Liquid Diets; Clear Liquid; Texture: Regular Texture (IDDSI 7); Fluid Consistency: Thin (IDDSI 0)   abd pain and right chest wall pain are better  Nausea better  No vomiting  He has not much to drink all day, I encourage him to start eating     Objective:    Vital Signs:  Temp:  [97.5 °F (36.4 °C)-98.4 °F (36.9 °C)] 97.8 °F (36.6 °C)  Heart Rate:  [53-58] 58  Resp:  [16-18] 18  BP: (138-190)/() 167/99    SpO2:  [90 %-95 %] 91 %  on   ;   Device (Oxygen Therapy): room air  Body mass index is 28.82 kg/m².    Physical Exam  Constitutional:       Appearance: Normal appearance.   HENT:      Head: Normocephalic and atraumatic.   Cardiovascular:      Rate and Rhythm: Normal rate and regular rhythm.      Heart sounds: No murmur heard.    No friction rub.   Pulmonary:      Effort: Pulmonary effort is normal.      Breath sounds: Normal breath sounds.   Abdominal:      General: Bowel sounds are normal. There is no distension.      Palpations: Abdomen is soft.      Tenderness: There is abdominal tenderness.   Skin:     General: Skin is warm and dry.   Neurological:      General: No focal deficit present.      Mental Status: He is alert.   Psychiatric:         Mood and Affect: Mood normal.         Behavior: Behavior normal.         Results Review:    Glucose   Date Value Ref Range Status   12/09/2022 98 65 - 99 mg/dL Final   12/08/2022 110 (H) 65 - 99 mg/dL Final     Results from last 7 days   Lab Units 12/09/22  0759   WBC 10*3/mm3 6.14   HEMOGLOBIN g/dL 13.9  13.9   HEMATOCRIT % 40.6  40.6   PLATELETS 10*3/mm3 201     Results from last 7 days   Lab Units 12/09/22  0759 12/08/22  0653   SODIUM mmol/L 140 138   POTASSIUM mmol/L 3.7 3.6   CHLORIDE mmol/L 103 101   CO2 mmol/L 26.4 26.0   BUN mg/dL 12 12   CREATININE mg/dL 0.93 0.84   CALCIUM mg/dL 8.8 8.8    BILIRUBIN mg/dL  --  0.5   ALK PHOS U/L  --  74   ALT (SGPT) U/L  --  20   AST (SGOT) U/L  --  17   GLUCOSE mg/dL 98 110*             Results from last 7 days   Lab Units 12/08/22  0653   TROPONIN T ng/mL <0.010     Cultures:  No results found for: BLOODCX, URINECX, WOUNDCX, MRSACX, RESPCX, STOOLCX    I have reviewed daily medications and changes in CPOE    Scheduled meds  amLODIPine, 10 mg, Oral, Daily  hydrALAZINE, 25 mg, Oral, Q8H  lisinopril, 20 mg, Oral, BID  rosuvastatin, 10 mg, Oral, Nightly  sucralfate, 1 g, Oral, 4x Daily AC & at Bedtime  vilazodone, 40 mg, Oral, Daily        pantoprazole, 8 mg/hr, Last Rate: 8 mg/hr (12/09/22 1257)      PRN meds  •  acetaminophen **OR** acetaminophen **OR** acetaminophen  •  hydrALAZINE  •  HYDROmorphone  •  ondansetron  •  ondansetron  •  [COMPLETED] Insert Peripheral IV **AND** sodium chloride  •  [COMPLETED] Insert Peripheral IV **AND** sodium chloride        Generalized abdominal pain    Mixed anxiety depressive disorder    Mixed hyperlipidemia    Essential hypertension    Diverticulosis    Lepe's esophagus without dysplasia    GERD (gastroesophageal reflux disease)    History of esophagitis        Assessment/Plan:  Mr. Coreas is a 57 y.o. with a history of Lepe's esophagus that is not on PPI who presents with abdominal pain, black diarrhea intermittent, nausea and vomiting.  CT abdomen pelvis with diverticulosis and diastasis of the rectus muscle but no other acute process.  Gallbladder unremarkable and no pancreatitis but lipase is mildly elevated.  He does not have right upper quadrant pain.     Possible GI bleed/atypical chest pain/abdominal pain  Consult gastroenterology.  He does have a history of PUD, Lepe's esophagus and states he is not on a PPI.  He has had a slight decline in hemoglobin since last seen and reports black watery stool. In addition admits to frequent alcohol use  Start IV Protonix drip and carafate  Gastroenterology planning for EGD  tomorrow  Trend Hgb q12hr and monitor stool for overt bleeding. NPO at midnight tonight. Add carafate.     Atypical Chest pain   CTA without PE. EKG without acute ischemic change. Normal troponin. Likely related to above. GI source     HTN   -BP elevated  -bp is better  -continue with norvasc and lisinopril  -nephrology seeing    DVT PPX: scd      Alden Choudhury MD  12/09/22  15:31 EST

## 2022-12-10 ENCOUNTER — ANESTHESIA (OUTPATIENT)
Dept: GASTROENTEROLOGY | Facility: HOSPITAL | Age: 57
End: 2022-12-10

## 2022-12-10 ENCOUNTER — ANESTHESIA EVENT (OUTPATIENT)
Dept: GASTROENTEROLOGY | Facility: HOSPITAL | Age: 57
End: 2022-12-10

## 2022-12-10 PROBLEM — K92.2 GASTROINTESTINAL HEMORRHAGE: Status: ACTIVE | Noted: 2022-12-08

## 2022-12-10 LAB
ALBUMIN SERPL-MCNC: 4 G/DL (ref 3.5–5.2)
ANION GAP SERPL CALCULATED.3IONS-SCNC: 12.6 MMOL/L (ref 5–15)
BASOPHILS # BLD AUTO: 0.03 10*3/MM3 (ref 0–0.2)
BASOPHILS NFR BLD AUTO: 0.4 % (ref 0–1.5)
BUN SERPL-MCNC: 11 MG/DL (ref 6–20)
BUN/CREAT SERPL: 13.1 (ref 7–25)
CALCIUM SPEC-SCNC: 8.8 MG/DL (ref 8.6–10.5)
CHLORIDE SERPL-SCNC: 103 MMOL/L (ref 98–107)
CO2 SERPL-SCNC: 23.4 MMOL/L (ref 22–29)
CREAT SERPL-MCNC: 0.84 MG/DL (ref 0.76–1.27)
DEPRECATED RDW RBC AUTO: 46.5 FL (ref 37–54)
EGFRCR SERPLBLD CKD-EPI 2021: 103 ML/MIN/1.73
EOSINOPHIL # BLD AUTO: 0.36 10*3/MM3 (ref 0–0.4)
EOSINOPHIL NFR BLD AUTO: 5.1 % (ref 0.3–6.2)
ERYTHROCYTE [DISTWIDTH] IN BLOOD BY AUTOMATED COUNT: 14.8 % (ref 12.3–15.4)
GLUCOSE SERPL-MCNC: 93 MG/DL (ref 65–99)
HCT VFR BLD AUTO: 44.1 % (ref 37.5–51)
HCT VFR BLD AUTO: 44.9 % (ref 37.5–51)
HGB BLD-MCNC: 14.7 G/DL (ref 13–17.7)
HGB BLD-MCNC: 15.1 G/DL (ref 13–17.7)
IMM GRANULOCYTES # BLD AUTO: 0.02 10*3/MM3 (ref 0–0.05)
IMM GRANULOCYTES NFR BLD AUTO: 0.3 % (ref 0–0.5)
LYMPHOCYTES # BLD AUTO: 1.29 10*3/MM3 (ref 0.7–3.1)
LYMPHOCYTES NFR BLD AUTO: 18.2 % (ref 19.6–45.3)
MCH RBC QN AUTO: 28.8 PG (ref 26.6–33)
MCHC RBC AUTO-ENTMCNC: 33.3 G/DL (ref 31.5–35.7)
MCV RBC AUTO: 86.3 FL (ref 79–97)
MONOCYTES # BLD AUTO: 0.52 10*3/MM3 (ref 0.1–0.9)
MONOCYTES NFR BLD AUTO: 7.4 % (ref 5–12)
NEUTROPHILS NFR BLD AUTO: 4.85 10*3/MM3 (ref 1.7–7)
NEUTROPHILS NFR BLD AUTO: 68.6 % (ref 42.7–76)
NRBC BLD AUTO-RTO: 0 /100 WBC (ref 0–0.2)
PHOSPHATE SERPL-MCNC: 3.2 MG/DL (ref 2.5–4.5)
PLATELET # BLD AUTO: 199 10*3/MM3 (ref 140–450)
PMV BLD AUTO: 9.4 FL (ref 6–12)
POTASSIUM SERPL-SCNC: 3.6 MMOL/L (ref 3.5–5.2)
RBC # BLD AUTO: 5.11 10*6/MM3 (ref 4.14–5.8)
SODIUM SERPL-SCNC: 139 MMOL/L (ref 136–145)
WBC NRBC COR # BLD: 7.07 10*3/MM3 (ref 3.4–10.8)

## 2022-12-10 PROCEDURE — 85014 HEMATOCRIT: CPT | Performed by: INTERNAL MEDICINE

## 2022-12-10 PROCEDURE — 25010000002 HYDROMORPHONE PER 4 MG: Performed by: INTERNAL MEDICINE

## 2022-12-10 PROCEDURE — 43235 EGD DIAGNOSTIC BRUSH WASH: CPT | Performed by: INTERNAL MEDICINE

## 2022-12-10 PROCEDURE — 85018 HEMOGLOBIN: CPT | Performed by: INTERNAL MEDICINE

## 2022-12-10 PROCEDURE — 36415 COLL VENOUS BLD VENIPUNCTURE: CPT | Performed by: INTERNAL MEDICINE

## 2022-12-10 PROCEDURE — 25010000002 HYDROMORPHONE PER 4 MG: Performed by: NURSE PRACTITIONER

## 2022-12-10 PROCEDURE — 85025 COMPLETE CBC W/AUTO DIFF WBC: CPT | Performed by: HOSPITALIST

## 2022-12-10 PROCEDURE — 25010000002 PROPOFOL 10 MG/ML EMULSION: Performed by: ANESTHESIOLOGY

## 2022-12-10 PROCEDURE — 0DJ08ZZ INSPECTION OF UPPER INTESTINAL TRACT, VIA NATURAL OR ARTIFICIAL OPENING ENDOSCOPIC: ICD-10-PCS | Performed by: INTERNAL MEDICINE

## 2022-12-10 PROCEDURE — 80069 RENAL FUNCTION PANEL: CPT | Performed by: HOSPITALIST

## 2022-12-10 RX ORDER — GLYCOPYRROLATE 0.2 MG/ML
INJECTION INTRAMUSCULAR; INTRAVENOUS AS NEEDED
Status: DISCONTINUED | OUTPATIENT
Start: 2022-12-10 | End: 2022-12-10 | Stop reason: SURG

## 2022-12-10 RX ORDER — SODIUM CHLORIDE 9 MG/ML
40 INJECTION, SOLUTION INTRAVENOUS AS NEEDED
Status: DISCONTINUED | OUTPATIENT
Start: 2022-12-10 | End: 2022-12-10

## 2022-12-10 RX ORDER — PROPOFOL 10 MG/ML
VIAL (ML) INTRAVENOUS CONTINUOUS PRN
Status: DISCONTINUED | OUTPATIENT
Start: 2022-12-10 | End: 2022-12-10 | Stop reason: SURG

## 2022-12-10 RX ORDER — HYDRALAZINE HYDROCHLORIDE 50 MG/1
50 TABLET, FILM COATED ORAL EVERY 8 HOURS SCHEDULED
Status: DISCONTINUED | OUTPATIENT
Start: 2022-12-10 | End: 2022-12-10

## 2022-12-10 RX ORDER — PROPOFOL 10 MG/ML
VIAL (ML) INTRAVENOUS AS NEEDED
Status: DISCONTINUED | OUTPATIENT
Start: 2022-12-10 | End: 2022-12-10 | Stop reason: SURG

## 2022-12-10 RX ORDER — SODIUM CHLORIDE 0.9 % (FLUSH) 0.9 %
10 SYRINGE (ML) INJECTION AS NEEDED
Status: DISCONTINUED | OUTPATIENT
Start: 2022-12-10 | End: 2022-12-10

## 2022-12-10 RX ORDER — POLYETHYLENE GLYCOL 3350 17 G/17G
0.5 POWDER, FOR SOLUTION ORAL EVERY 12 HOURS
Status: COMPLETED | OUTPATIENT
Start: 2022-12-10 | End: 2022-12-11

## 2022-12-10 RX ORDER — HYDRALAZINE HYDROCHLORIDE 25 MG/1
37.5 TABLET, FILM COATED ORAL EVERY 8 HOURS SCHEDULED
Status: DISCONTINUED | OUTPATIENT
Start: 2022-12-10 | End: 2022-12-11

## 2022-12-10 RX ORDER — SODIUM CHLORIDE 9 MG/ML
30 INJECTION, SOLUTION INTRAVENOUS CONTINUOUS PRN
Status: DISCONTINUED | OUTPATIENT
Start: 2022-12-10 | End: 2022-12-12 | Stop reason: HOSPADM

## 2022-12-10 RX ORDER — SODIUM CHLORIDE 0.9 % (FLUSH) 0.9 %
10 SYRINGE (ML) INJECTION EVERY 12 HOURS SCHEDULED
Status: DISCONTINUED | OUTPATIENT
Start: 2022-12-10 | End: 2022-12-10

## 2022-12-10 RX ORDER — PANTOPRAZOLE SODIUM 40 MG/1
40 TABLET, DELAYED RELEASE ORAL
Status: DISCONTINUED | OUTPATIENT
Start: 2022-12-10 | End: 2022-12-12 | Stop reason: HOSPADM

## 2022-12-10 RX ADMIN — LISINOPRIL 20 MG: 20 TABLET ORAL at 11:29

## 2022-12-10 RX ADMIN — PANTOPRAZOLE SODIUM 8 MG/HR: 40 INJECTION, POWDER, FOR SOLUTION INTRAVENOUS at 00:08

## 2022-12-10 RX ADMIN — HYDROMORPHONE HYDROCHLORIDE 0.5 MG: 1 INJECTION, SOLUTION INTRAMUSCULAR; INTRAVENOUS; SUBCUTANEOUS at 19:38

## 2022-12-10 RX ADMIN — POLYETHYLENE GLYCOL 3350 0.5 BOTTLE: 17 POWDER, FOR SOLUTION ORAL at 17:14

## 2022-12-10 RX ADMIN — ROSUVASTATIN CALCIUM 10 MG: 10 TABLET, FILM COATED ORAL at 21:17

## 2022-12-10 RX ADMIN — PROPOFOL 150 MG: 10 INJECTION, EMULSION INTRAVENOUS at 10:31

## 2022-12-10 RX ADMIN — SUCRALFATE 1 G: 1 TABLET ORAL at 11:30

## 2022-12-10 RX ADMIN — SUCRALFATE 1 G: 1 TABLET ORAL at 21:17

## 2022-12-10 RX ADMIN — AMLODIPINE BESYLATE 10 MG: 10 TABLET ORAL at 11:30

## 2022-12-10 RX ADMIN — HYDROMORPHONE HYDROCHLORIDE 0.5 MG: 1 INJECTION, SOLUTION INTRAMUSCULAR; INTRAVENOUS; SUBCUTANEOUS at 08:53

## 2022-12-10 RX ADMIN — HYDRALAZINE HYDROCHLORIDE 37.5 MG: 25 TABLET, FILM COATED ORAL at 21:16

## 2022-12-10 RX ADMIN — PANTOPRAZOLE SODIUM 8 MG/HR: 40 INJECTION, POWDER, FOR SOLUTION INTRAVENOUS at 05:02

## 2022-12-10 RX ADMIN — HYDROMORPHONE HYDROCHLORIDE 0.5 MG: 1 INJECTION, SOLUTION INTRAMUSCULAR; INTRAVENOUS; SUBCUTANEOUS at 03:38

## 2022-12-10 RX ADMIN — SUCRALFATE 1 G: 1 TABLET ORAL at 05:02

## 2022-12-10 RX ADMIN — Medication 300 MCG/KG/MIN: at 10:33

## 2022-12-10 RX ADMIN — HYDRALAZINE HYDROCHLORIDE 50 MG: 50 TABLET, FILM COATED ORAL at 12:56

## 2022-12-10 RX ADMIN — GLYCOPYRROLATE 0.2 MG: 0.2 INJECTION INTRAMUSCULAR; INTRAVENOUS at 10:27

## 2022-12-10 RX ADMIN — LISINOPRIL 20 MG: 20 TABLET ORAL at 21:17

## 2022-12-10 RX ADMIN — PANTOPRAZOLE SODIUM 40 MG: 40 TABLET, DELAYED RELEASE ORAL at 17:14

## 2022-12-10 RX ADMIN — HYDRALAZINE HYDROCHLORIDE 25 MG: 25 TABLET, FILM COATED ORAL at 05:01

## 2022-12-10 RX ADMIN — SUCRALFATE 1 G: 1 TABLET ORAL at 17:14

## 2022-12-10 RX ADMIN — VILAZODONE HYDROCHLORIDE 40 MG: 40 TABLET, FILM COATED ORAL at 11:29

## 2022-12-10 RX ADMIN — SODIUM CHLORIDE 30 ML/HR: 9 INJECTION, SOLUTION INTRAVENOUS at 09:42

## 2022-12-10 RX ADMIN — HYDROMORPHONE HYDROCHLORIDE 0.5 MG: 1 INJECTION, SOLUTION INTRAMUSCULAR; INTRAVENOUS; SUBCUTANEOUS at 12:22

## 2022-12-10 NOTE — PROGRESS NOTES
[facility]     LOS: 2 days     Subjective:    CC:Abdominal Pain    DIET:  Diet Order   Procedures   • Diet: Liquid Diets; Clear Liquid; Texture: Regular Texture (IDDSI 7); Fluid Consistency: Thin (IDDSI 0)     No chest pain palpitations or shortness of breath reported.  The epigastric pain has improved.  He is now reporting left lower quadrant tenderness and it is cramping in quality.  Not reporting any nausea or vomiting currently.  No diarrhea reported.  No dysuria reported.    Objective:    Vital Signs:  Temp:  [97.7 °F (36.5 °C)-98.6 °F (37 °C)] 98.2 °F (36.8 °C)  Heart Rate:  [52-87] 65  Resp:  [15-18] 16  BP: (116-188)/() 167/99    SpO2:  [91 %-98 %] 93 %  on  Flow (L/min):  [10] 10;   Device (Oxygen Therapy): room air  Body mass index is 28.82 kg/m².    Physical Exam  Vitals and nursing note reviewed.   Constitutional:       General: He is not in acute distress.     Appearance: He is not diaphoretic.   HENT:      Head: Normocephalic and atraumatic.   Eyes:      General:         Right eye: No discharge.         Left eye: No discharge.      Conjunctiva/sclera: Conjunctivae normal.   Cardiovascular:      Rate and Rhythm: Normal rate and regular rhythm.      Heart sounds: No murmur heard.    No friction rub.   Pulmonary:      Effort: Pulmonary effort is normal.      Breath sounds: Normal breath sounds.   Abdominal:      General: Bowel sounds are normal. There is no distension.      Palpations: Abdomen is soft.      Tenderness: There is abdominal tenderness. There is no guarding or rebound.   Musculoskeletal:         General: No swelling or tenderness.      Cervical back: Neck supple. No tenderness.   Skin:     General: Skin is warm and dry.   Neurological:      Mental Status: He is alert.      Cranial Nerves: No cranial nerve deficit.   Psychiatric:         Mood and Affect: Mood normal.         Behavior: Behavior normal.         Results Review:    Glucose   Date Value Ref Range  Status   12/10/2022 93 65 - 99 mg/dL Final   12/09/2022 98 65 - 99 mg/dL Final   12/08/2022 110 (H) 65 - 99 mg/dL Final     Results from last 7 days   Lab Units 12/10/22  0805   WBC 10*3/mm3 7.07   HEMOGLOBIN g/dL 14.7   HEMATOCRIT % 44.1   PLATELETS 10*3/mm3 199     Results from last 7 days   Lab Units 12/10/22  0805 12/09/22  0759 12/08/22  0653   SODIUM mmol/L 139   < > 138   POTASSIUM mmol/L 3.6   < > 3.6   CHLORIDE mmol/L 103   < > 101   CO2 mmol/L 23.4   < > 26.0   BUN mg/dL 11   < > 12   CREATININE mg/dL 0.84   < > 0.84   CALCIUM mg/dL 8.8   < > 8.8   BILIRUBIN mg/dL  --   --  0.5   ALK PHOS U/L  --   --  74   ALT (SGPT) U/L  --   --  20   AST (SGOT) U/L  --   --  17   GLUCOSE mg/dL 93   < > 110*    < > = values in this interval not displayed.             Results from last 7 days   Lab Units 12/08/22  0653   TROPONIN T ng/mL <0.010     Cultures:  No results found for: BLOODCX, URINECX, WOUNDCX, MRSACX, RESPCX, STOOLCX    I have reviewed daily medications and changes in CPOE    Scheduled meds  amLODIPine, 10 mg, Oral, Daily  hydrALAZINE, 37.5 mg, Oral, Q8H  lisinopril, 20 mg, Oral, BID  polyethylene glycol, 0.5 bottle, Oral, Q12H  rosuvastatin, 10 mg, Oral, Nightly  sucralfate, 1 g, Oral, 4x Daily AC & at Bedtime  vilazodone, 40 mg, Oral, Daily        sodium chloride, 30 mL/hr, Last Rate: Stopped (12/10/22 1057)      PRN meds  •  acetaminophen **OR** acetaminophen **OR** acetaminophen  •  hydrALAZINE  •  HYDROmorphone  •  ondansetron  •  ondansetron  •  [COMPLETED] Insert Peripheral IV **AND** sodium chloride  •  [COMPLETED] Insert Peripheral IV **AND** sodium chloride  •  sodium chloride        Generalized abdominal pain    Mixed anxiety depressive disorder    Mixed hyperlipidemia    Essential hypertension    Diverticulosis    Lepe's esophagus without dysplasia    GERD (gastroesophageal reflux disease)    History of esophagitis    Gastrointestinal hemorrhage    I reviewed imaging, agree with  interpretation  I reviewed telemetry, sinus    Assessment/Plan:  Mr. Coreas is a 57 y.o. with a history of Lepe's esophagus that is not on PPI who presents with abdominal pain, black diarrhea intermittent, nausea and vomiting.     Lepe's esophagus/melena:  -EGD 12/10 showing the Lepe's.  Carafate on board.  Start Protonix p.o.  Repeat EGD 3 months.  -Colonoscopy planned tomorrow.  Monitor hemoglobin and transfuse if needed.  -GI following     HTN   - Secondary hypertension work-up is underway per nephrology.  Renal arteries were okay.  -Titration per nephrology.    HLD: Statin  Depression: Viibryd    DVT PPX: scd  Disposition: Home/few days      Ayo Leon MD  12/10/22  15:28 EST

## 2022-12-10 NOTE — PLAN OF CARE
Goal Outcome Evaluation:  Plan of Care Reviewed With: patient        Progress: no change  Outcome Evaluation: VSS. Telemetry monitor, SR. Alert and oriented x4, room air. Up ad chinyere. Protonix PO. Clear liq diet. NPO after midnight for colonoscopy keerthi. Will cont to monitor.

## 2022-12-10 NOTE — ANESTHESIA POSTPROCEDURE EVALUATION
"Patient: Flako Coreas    Procedure Summary     Date: 12/10/22 Room / Location:  [facility]    Anesthesia Start: 1024 Anesthesia Stop: 1040    Procedure: ESOPHAGOGASTRODUODENOSCOPY (Esophagus) Diagnosis:       Generalized abdominal pain      History of esophagitis      Lepe's esophagus without dysplasia      (Generalized abdominal pain [R10.84])      (History of esophagitis [Z87.19])      (Lepe's esophagus without dysplasia [K22.70])    Surgeons: Albert Gallo MD Provider: Khurram Messer MD    Anesthesia Type: MAC ASA Status: 3          Anesthesia Type: MAC    Vitals  Vitals Value Taken Time   /84 12/10/22 1049   Temp     Pulse 87 12/10/22 1049   Resp 16 12/10/22 1049   SpO2 94 % 12/10/22 1049           Post Anesthesia Care and Evaluation    Patient location during evaluation: bedside  Patient participation: complete - patient participated  Level of consciousness: awake and alert  Pain management: adequate    Airway patency: patent  Anesthetic complications: No anesthetic complications    Cardiovascular status: acceptable  Respiratory status: acceptable  Hydration status: acceptable    Comments: /84 (BP Location: Left arm, Patient Position: Lying)   Pulse 87   Temp 36.5 °C (97.7 °F) (Oral)   Resp 16   Ht 188 cm (74\")   Wt 102 kg (224 lb 8 oz)   SpO2 94%   BMI 28.82 kg/m²       "

## 2022-12-10 NOTE — ANESTHESIA PREPROCEDURE EVALUATION
Anesthesia Evaluation     Patient summary reviewed and Nursing notes reviewed   history of anesthetic complications: PONV  NPO Solid Status: > 8 hours  NPO Liquid Status: > 8 hours           Airway   Mallampati: II  Dental      Pulmonary - normal exam   (+) pneumonia ,   Cardiovascular - normal exam    (+) hypertension 2 medications or greater, hyperlipidemia,       Neuro/Psych  (+) psychiatric history Anxiety and Depression,    GI/Hepatic/Renal/Endo    (+)  GERD,      Musculoskeletal     Abdominal    Substance History      OB/GYN          Other                        Anesthesia Plan    ASA 3     MAC     intravenous induction     Anesthetic plan, risks, benefits, and alternatives have been provided, discussed and informed consent has been obtained with: patient.        CODE STATUS:    Level Of Support Discussed With: Patient  Code Status (Patient has no pulse and is not breathing): CPR (Attempt to Resuscitate)  Medical Interventions (Patient has pulse or is breathing): Full Support

## 2022-12-10 NOTE — PROGRESS NOTES
[facility] Progress Note      Patient Name: Flako Coreas  : 1965  MRN: [id number]  Primary Care Physician:  Akash Rodriguez Jr.,   Date of admission: 2022    Subjective     Interval History:   Epigastric and substernal pain is gone, having some crampy llq pain    Review of Systems:   14 point review of systems is otherwise negative except for mentioned above on HPI    Objective     Vitals:   Temp:  [97.7 °F (36.5 °C)-98.6 °F (37 °C)] 98.2 °F (36.8 °C)  Heart Rate:  [52-87] 65  Resp:  [15-18] 16  BP: (116-188)/() 167/99  Flow (L/min):  [10] 10    Intake/Output Summary (Last 24 hours) at 12/10/2022 1349  Last data filed at 12/10/2022 0737  Gross per 24 hour   Intake --   Output 1175 ml   Net -1175 ml       Physical Exam:    General Appearance: alert, oriented x 3, no acute distress   Skin: warm and dry  HEENT: oral mucosa normal, nonicteric sclera  Neck: supple, no JVD  Lungs: CTA  Heart: RRR, normal S1 and S2  Abdomen: soft, nontender, nondistended  : no palpable bladder  Extremities: no edema, cyanosis or clubbing  Neuro: normal speech and mental status     Scheduled Meds:     amLODIPine, 10 mg, Oral, Daily  hydrALAZINE, 50 mg, Oral, Q8H  lisinopril, 20 mg, Oral, BID  polyethylene glycol, 0.5 bottle, Oral, Q12H  rosuvastatin, 10 mg, Oral, Nightly  sucralfate, 1 g, Oral, 4x Daily AC & at Bedtime  vilazodone, 40 mg, Oral, Daily      IV Meds:   sodium chloride, 30 mL/hr, Last Rate: Stopped (12/10/22 1057)        Results Reviewed:   I have personally reviewed the results from the time of this admission to 12/10/2022 13:49 EST     Results from last 7 days   Lab Units 12/10/22  0805 22  0759 22  0653   SODIUM mmol/L 139 140 138   POTASSIUM mmol/L 3.6 3.7 3.6   CHLORIDE mmol/L 103 103 101   CO2 mmol/L 23.4 26.4 26.0   BUN mg/dL 11 12 12   CREATININE mg/dL 0.84 0.93 0.84   CALCIUM mg/dL 8.8 8.8 8.8   BILIRUBIN mg/dL  --   --  0.5   ALK PHOS U/L  --    --  74   ALT (SGPT) U/L  --   --  20   AST (SGOT) U/L  --   --  17   GLUCOSE mg/dL 93 98 110*     Estimated Creatinine Clearance: 126.6 mL/min (by C-G formula based on SCr of 0.84 mg/dL).  Results from last 7 days   Lab Units 12/10/22  0805   PHOSPHORUS mg/dL 3.2         Results from last 7 days   Lab Units 12/10/22  0805 12/09/22 2011 12/09/22  0759 12/08/22 2003 12/08/22  0653   WBC 10*3/mm3 7.07  --  6.14  --  6.27   HEMOGLOBIN g/dL 14.7 14.2 13.9  13.9 14.4 12.1*   PLATELETS 10*3/mm3 199  --  201  --  186           Assessment / Plan     ASSESSMENT:  1. Accelerated hypertension-renal artery duplex was normal, other secondary cause tests are pending; overally control is better; he is nervous about taking more hydralazine, I initially prescribed 50mg tid but he was worried that would make him pass out  2. Abdominal pain-suspect functional, dr Gallo is seeing    PLAN:  1. Increase hydralazine gently   2. Continue other medicines  3. Await secondary cause eval results    Thank you for involving us in the care of Flako Coreas.  Please feel free to call with any questions.    Berhane Jimenez MD  12/10/22  13:49 EST    [facility]  [phone]

## 2022-12-11 ENCOUNTER — ANESTHESIA EVENT (OUTPATIENT)
Dept: GASTROENTEROLOGY | Facility: HOSPITAL | Age: 57
End: 2022-12-11

## 2022-12-11 ENCOUNTER — ANESTHESIA (OUTPATIENT)
Dept: GASTROENTEROLOGY | Facility: HOSPITAL | Age: 57
End: 2022-12-11

## 2022-12-11 LAB
ALBUMIN SERPL-MCNC: 4.4 G/DL (ref 3.5–5.2)
ALBUMIN/GLOB SERPL: 1.6 G/DL
ALP SERPL-CCNC: 78 U/L (ref 39–117)
ALT SERPL W P-5'-P-CCNC: 25 U/L (ref 1–41)
ANION GAP SERPL CALCULATED.3IONS-SCNC: 11.5 MMOL/L (ref 5–15)
AST SERPL-CCNC: 22 U/L (ref 1–40)
BASOPHILS # BLD AUTO: 0.02 10*3/MM3 (ref 0–0.2)
BASOPHILS NFR BLD AUTO: 0.3 % (ref 0–1.5)
BILIRUB SERPL-MCNC: 0.7 MG/DL (ref 0–1.2)
BUN SERPL-MCNC: 11 MG/DL (ref 6–20)
BUN/CREAT SERPL: 11.6 (ref 7–25)
CALCIUM SPEC-SCNC: 9.2 MG/DL (ref 8.6–10.5)
CHLORIDE SERPL-SCNC: 104 MMOL/L (ref 98–107)
CO2 SERPL-SCNC: 22.5 MMOL/L (ref 22–29)
CREAT SERPL-MCNC: 0.95 MG/DL (ref 0.76–1.27)
DEPRECATED RDW RBC AUTO: 46.3 FL (ref 37–54)
EGFRCR SERPLBLD CKD-EPI 2021: 94.5 ML/MIN/1.73
EOSINOPHIL # BLD AUTO: 0.42 10*3/MM3 (ref 0–0.4)
EOSINOPHIL NFR BLD AUTO: 5.8 % (ref 0.3–6.2)
ERYTHROCYTE [DISTWIDTH] IN BLOOD BY AUTOMATED COUNT: 14.7 % (ref 12.3–15.4)
GLOBULIN UR ELPH-MCNC: 2.8 GM/DL
GLUCOSE SERPL-MCNC: 100 MG/DL (ref 65–99)
HCT VFR BLD AUTO: 45.6 % (ref 37.5–51)
HGB BLD-MCNC: 14.8 G/DL (ref 13–17.7)
IMM GRANULOCYTES # BLD AUTO: 0.01 10*3/MM3 (ref 0–0.05)
IMM GRANULOCYTES NFR BLD AUTO: 0.1 % (ref 0–0.5)
INR PPP: 0.94 (ref 0.9–1.1)
LYMPHOCYTES # BLD AUTO: 1.26 10*3/MM3 (ref 0.7–3.1)
LYMPHOCYTES NFR BLD AUTO: 17.4 % (ref 19.6–45.3)
MCH RBC QN AUTO: 27.9 PG (ref 26.6–33)
MCHC RBC AUTO-ENTMCNC: 32.5 G/DL (ref 31.5–35.7)
MCV RBC AUTO: 85.9 FL (ref 79–97)
MONOCYTES # BLD AUTO: 0.65 10*3/MM3 (ref 0.1–0.9)
MONOCYTES NFR BLD AUTO: 9 % (ref 5–12)
NEUTROPHILS NFR BLD AUTO: 4.9 10*3/MM3 (ref 1.7–7)
NEUTROPHILS NFR BLD AUTO: 67.4 % (ref 42.7–76)
NRBC BLD AUTO-RTO: 0 /100 WBC (ref 0–0.2)
PLATELET # BLD AUTO: 236 10*3/MM3 (ref 140–450)
PMV BLD AUTO: 9.8 FL (ref 6–12)
POTASSIUM SERPL-SCNC: 3.5 MMOL/L (ref 3.5–5.2)
PROT SERPL-MCNC: 7.2 G/DL (ref 6–8.5)
PROTHROMBIN TIME: 12.7 SECONDS (ref 11.7–14.2)
RBC # BLD AUTO: 5.31 10*6/MM3 (ref 4.14–5.8)
SODIUM SERPL-SCNC: 138 MMOL/L (ref 136–145)
WBC NRBC COR # BLD: 7.26 10*3/MM3 (ref 3.4–10.8)

## 2022-12-11 PROCEDURE — 85610 PROTHROMBIN TIME: CPT | Performed by: INTERNAL MEDICINE

## 2022-12-11 PROCEDURE — 25010000002 HYDRALAZINE PER 20 MG: Performed by: INTERNAL MEDICINE

## 2022-12-11 PROCEDURE — 45385 COLONOSCOPY W/LESION REMOVAL: CPT | Performed by: INTERNAL MEDICINE

## 2022-12-11 PROCEDURE — 80053 COMPREHEN METABOLIC PANEL: CPT | Performed by: INTERNAL MEDICINE

## 2022-12-11 PROCEDURE — 25010000002 PROPOFOL 10 MG/ML EMULSION: Performed by: ANESTHESIOLOGY

## 2022-12-11 PROCEDURE — 25010000002 HYDROMORPHONE PER 4 MG: Performed by: INTERNAL MEDICINE

## 2022-12-11 PROCEDURE — 0DBK8ZZ EXCISION OF ASCENDING COLON, VIA NATURAL OR ARTIFICIAL OPENING ENDOSCOPIC: ICD-10-PCS | Performed by: INTERNAL MEDICINE

## 2022-12-11 PROCEDURE — 88305 TISSUE EXAM BY PATHOLOGIST: CPT | Performed by: INTERNAL MEDICINE

## 2022-12-11 PROCEDURE — 85025 COMPLETE CBC W/AUTO DIFF WBC: CPT | Performed by: INTERNAL MEDICINE

## 2022-12-11 RX ORDER — PROPOFOL 10 MG/ML
VIAL (ML) INTRAVENOUS AS NEEDED
Status: DISCONTINUED | OUTPATIENT
Start: 2022-12-11 | End: 2022-12-11 | Stop reason: SURG

## 2022-12-11 RX ORDER — LIDOCAINE HYDROCHLORIDE 20 MG/ML
INJECTION, SOLUTION INFILTRATION; PERINEURAL AS NEEDED
Status: DISCONTINUED | OUTPATIENT
Start: 2022-12-11 | End: 2022-12-11 | Stop reason: SURG

## 2022-12-11 RX ORDER — GLYCOPYRROLATE 0.2 MG/ML
INJECTION INTRAMUSCULAR; INTRAVENOUS AS NEEDED
Status: DISCONTINUED | OUTPATIENT
Start: 2022-12-11 | End: 2022-12-11 | Stop reason: SURG

## 2022-12-11 RX ORDER — PROPOFOL 10 MG/ML
VIAL (ML) INTRAVENOUS CONTINUOUS PRN
Status: DISCONTINUED | OUTPATIENT
Start: 2022-12-11 | End: 2022-12-11 | Stop reason: SURG

## 2022-12-11 RX ORDER — CLONIDINE HYDROCHLORIDE 0.1 MG/1
0.1 TABLET ORAL EVERY 8 HOURS PRN
Status: DISCONTINUED | OUTPATIENT
Start: 2022-12-11 | End: 2022-12-12 | Stop reason: HOSPADM

## 2022-12-11 RX ORDER — SODIUM CHLORIDE 9 MG/ML
30 INJECTION, SOLUTION INTRAVENOUS CONTINUOUS PRN
Status: DISCONTINUED | OUTPATIENT
Start: 2022-12-11 | End: 2022-12-12 | Stop reason: HOSPADM

## 2022-12-11 RX ORDER — HYDROCODONE BITARTRATE AND ACETAMINOPHEN 5; 325 MG/1; MG/1
1 TABLET ORAL EVERY 6 HOURS PRN
Status: DISCONTINUED | OUTPATIENT
Start: 2022-12-11 | End: 2022-12-12 | Stop reason: HOSPADM

## 2022-12-11 RX ORDER — HYDRALAZINE HYDROCHLORIDE 20 MG/ML
10 INJECTION INTRAMUSCULAR; INTRAVENOUS EVERY 6 HOURS PRN
Status: DISCONTINUED | OUTPATIENT
Start: 2022-12-11 | End: 2022-12-12 | Stop reason: HOSPADM

## 2022-12-11 RX ORDER — HYDRALAZINE HYDROCHLORIDE 50 MG/1
50 TABLET, FILM COATED ORAL EVERY 8 HOURS SCHEDULED
Status: DISCONTINUED | OUTPATIENT
Start: 2022-12-11 | End: 2022-12-12 | Stop reason: HOSPADM

## 2022-12-11 RX ADMIN — CLONIDINE HYDROCHLORIDE 0.1 MG: 0.1 TABLET ORAL at 15:54

## 2022-12-11 RX ADMIN — VILAZODONE HYDROCHLORIDE 40 MG: 40 TABLET, FILM COATED ORAL at 10:50

## 2022-12-11 RX ADMIN — LIDOCAINE HYDROCHLORIDE 60 MG: 20 INJECTION, SOLUTION INFILTRATION; PERINEURAL at 09:40

## 2022-12-11 RX ADMIN — AMLODIPINE BESYLATE 10 MG: 10 TABLET ORAL at 10:49

## 2022-12-11 RX ADMIN — HYDRALAZINE HYDROCHLORIDE 10 MG: 20 INJECTION INTRAMUSCULAR; INTRAVENOUS at 17:40

## 2022-12-11 RX ADMIN — Medication 200 MCG/KG/MIN: at 09:40

## 2022-12-11 RX ADMIN — SUCRALFATE 1 G: 1 TABLET ORAL at 10:49

## 2022-12-11 RX ADMIN — HYDRALAZINE HYDROCHLORIDE 50 MG: 50 TABLET, FILM COATED ORAL at 22:14

## 2022-12-11 RX ADMIN — SODIUM CHLORIDE 30 ML/HR: 9 INJECTION, SOLUTION INTRAVENOUS at 08:53

## 2022-12-11 RX ADMIN — SUCRALFATE 1 G: 1 TABLET ORAL at 17:37

## 2022-12-11 RX ADMIN — PROPOFOL 60 MG: 10 INJECTION, EMULSION INTRAVENOUS at 09:44

## 2022-12-11 RX ADMIN — ROSUVASTATIN CALCIUM 10 MG: 10 TABLET, FILM COATED ORAL at 21:18

## 2022-12-11 RX ADMIN — HYDROMORPHONE HYDROCHLORIDE 0.5 MG: 1 INJECTION, SOLUTION INTRAMUSCULAR; INTRAVENOUS; SUBCUTANEOUS at 01:37

## 2022-12-11 RX ADMIN — PROPOFOL 150 MG: 10 INJECTION, EMULSION INTRAVENOUS at 09:40

## 2022-12-11 RX ADMIN — POLYETHYLENE GLYCOL 3350 0.5 BOTTLE: 17 POWDER, FOR SOLUTION ORAL at 04:48

## 2022-12-11 RX ADMIN — LISINOPRIL 20 MG: 20 TABLET ORAL at 10:49

## 2022-12-11 RX ADMIN — LISINOPRIL 20 MG: 20 TABLET ORAL at 21:18

## 2022-12-11 RX ADMIN — SUCRALFATE 1 G: 1 TABLET ORAL at 21:18

## 2022-12-11 RX ADMIN — GLYCOPYRROLATE 0.1 MG: 1 INJECTION INTRAMUSCULAR; INTRAVENOUS at 09:37

## 2022-12-11 RX ADMIN — HYDRALAZINE HYDROCHLORIDE 50 MG: 50 TABLET, FILM COATED ORAL at 13:57

## 2022-12-11 RX ADMIN — HYDRALAZINE HYDROCHLORIDE 37.5 MG: 25 TABLET, FILM COATED ORAL at 06:24

## 2022-12-11 RX ADMIN — PANTOPRAZOLE SODIUM 40 MG: 40 TABLET, DELAYED RELEASE ORAL at 17:37

## 2022-12-11 RX ADMIN — PANTOPRAZOLE SODIUM 40 MG: 40 TABLET, DELAYED RELEASE ORAL at 10:49

## 2022-12-11 RX ADMIN — Medication 180 MCG/KG/MIN: at 08:58

## 2022-12-11 NOTE — ANESTHESIA PREPROCEDURE EVALUATION
Anesthesia Evaluation     Patient summary reviewed and Nursing notes reviewed   history of anesthetic complications: PONV  NPO Solid Status: > 8 hours  NPO Liquid Status: > 8 hours           Airway   Mallampati: II  Dental      Pulmonary - normal exam   (+) pneumonia ,   Cardiovascular - normal exam    (+) hypertension 2 medications or greater, hyperlipidemia,       Neuro/Psych  (+) psychiatric history Anxiety and Depression,    GI/Hepatic/Renal/Endo    (+)  GERD, GI bleeding ,     Musculoskeletal     Abdominal    Substance History      OB/GYN          Other                          Anesthesia Plan    ASA 3     MAC       Anesthetic plan, risks, benefits, and alternatives have been provided, discussed and informed consent has been obtained with: patient.        CODE STATUS:

## 2022-12-11 NOTE — PROGRESS NOTES
"    DAILY PROGRESS NOTE  [facility]    Patient Identification:  Name: Flako Coreas  Age: 57 y.o.  Sex: male  :  1965  MRN: [id number]         Primary Care Physician: Akash Rodriguez Jr., DO    Subjective:  Interval History: Tolerated bowel prep.  No melena reported any states stool is nearly clear.  Abdominal pain pretty much resolved and it seemed to revolve more around his Lepe's esophagus.  Denies any chest pain shortness of breath confusion nausea and or vomiting    Objective: No family at bedside.  Patient conversational and pleasant.  Alert and oriented x3    Scheduled Meds:amLODIPine, 10 mg, Oral, Daily  hydrALAZINE, 37.5 mg, Oral, Q8H  lisinopril, 20 mg, Oral, BID  pantoprazole, 40 mg, Oral, BID AC  rosuvastatin, 10 mg, Oral, Nightly  sucralfate, 1 g, Oral, 4x Daily AC & at Bedtime  vilazodone, 40 mg, Oral, Daily      Continuous Infusions:sodium chloride, 30 mL/hr, Last Rate: Stopped (12/10/22 1057)        Vital signs in last 24 hours:  Temp:  [97.7 °F (36.5 °C)-98.5 °F (36.9 °C)] 97.7 °F (36.5 °C)  Heart Rate:  [59-87] 66  Resp:  [15-16] 16  BP: (116-178)/() 158/103    Intake/Output:    Intake/Output Summary (Last 24 hours) at 2022 0820  Last data filed at 12/10/2022 1500  Gross per 24 hour   Intake 320 ml   Output --   Net 320 ml       Exam:  BP (!) 158/103 (BP Location: Right arm, Patient Position: Lying)   Pulse 66   Temp 97.7 °F (36.5 °C) (Oral)   Resp 16   Ht 188 cm (74\")   Wt 102 kg (224 lb 8 oz)   SpO2 92%   BMI 28.82 kg/m²     General Appearance:    Alert, cooperative, no distress, AAOx3                        Throat:   Oral mucosa pink and moist                           Neck:   No JVD                         Lungs:    Clear to auscultation bilaterally, respirations unlabored                 Chest Wall:    No tenderness or deformity                          Heart:    Regular rate and rhythm, S1 and S2 normal                  Abdomen:     Soft, " nontender, bowel sounds active, no rebound or guarding                 Extremities: Moving all, no cyanosis or edema                        Pulses:   Pulses palpable in lower extremities                               Data Review:  Labs in chart were reviewed.    Assessment:  Active Hospital Problems    Diagnosis  POA   • **Generalized abdominal pain [R10.84]  Yes   • History of esophagitis [Z87.19]  Not Applicable   • Gastrointestinal hemorrhage [K92.2]  Unknown   • Lepe's esophagus without dysplasia [K22.70]  Yes   • GERD (gastroesophageal reflux disease) [K21.9]  Yes   • Diverticulosis [K57.90]  Yes   • Mixed hyperlipidemia [E78.2]  Yes   • Essential hypertension [I10]  Yes   • Mixed anxiety depressive disorder [F41.8]  Yes      Resolved Hospital Problems   No resolved problems to display.       Plan:    Abdominal pain nearly resolved    Lepe's esophagus -PPI/Carafate    No signs of acute blood loss -Hgb stable and normal so we will reduce checks to daily    GI to perform colonoscopy today -appreciate the endoscopic assistance    Hypertension managed per nephrology with negative renal artery scan   -Discussed with renal MD and he is going to slowly titrate hydralazine   -Also on twice daily ACE, 10 mg of Norvasc   -Likely could use outpatient sleep study for refractory hypertension   -Previous use of NSAIDs not endorsed      Disposition -likely home tomorrow pending the above    Sebastian Gonsalez MD  12/11/2022  08:20 EST

## 2022-12-11 NOTE — PLAN OF CARE
Goal Outcome Evaluation:  Plan of Care Reviewed With: patient        Progress: improving  Outcome Evaluation: VSS. Telemetry monitor, SR. Alert and oriented x4, room air. Up ad chinyere. Colonoscopy today. BP elevated. Advanced to gi soft diet. Will cont to monitor.

## 2022-12-11 NOTE — CASE MANAGEMENT/SOCIAL WORK
Discharge Planning Assessment  [facility]     Patient Name: Flako Coreas  MRN: [id number]  Today's Date: 12/11/2022    Admit Date: 12/8/2022    Plan: Return home   Discharge Needs Assessment     Row Name 12/11/22 1458       Living Environment    People in Home alone    Current Living Arrangements apartment    Primary Care Provided by self    Provides Primary Care For no one    Family Caregiver if Needed child(temo), adult    Family Caregiver Names Son Flako [phone]    Quality of Family Relationships helpful    Able to Return to Prior Arrangements yes       Resource/Environmental Concerns    Resource/Environmental Concerns none    Transportation Concerns none       Transition Planning    Patient/Family Anticipates Transition to home    Patient/Family Anticipated Services at Transition none    Transportation Anticipated family or friend will provide       Discharge Needs Assessment    Readmission Within the Last 30 Days no previous admission in last 30 days    Equipment Currently Used at Home none    Concerns to be Addressed denies needs/concerns at this time    Anticipated Changes Related to Illness none    Equipment Needed After Discharge none               Discharge Plan     Row Name 12/11/22 1459       Plan    Plan Return home    Patient/Family in Agreement with Plan yes    Plan Comments Spoke with patient at bedside.  Patient lives alone, is IADL, uses no DME, has never used HH.  PCP is Dr. Akash Rodriguez and pharmacy is [facility] [place].  Emergency contact is his brother Pk [phone] or son Flako [phone].  Patient plans to return home at DC and does not ancitipate any DC needs.  CCP will follow as needed.  Elly SIDHU              Continued Care and Services - Admitted Since 12/8/2022    Coordination has not been started for this encounter.       Expected Discharge Date and Time     Expected Discharge Date Expected Discharge Time    Dec 12, 2022          Demographic  Summary     Row Name 12/11/22 1458       General Information    Admission Type inpatient    Arrived From home    Referral Source admission list    Reason for Consult discharge planning    Preferred Language English               Functional Status     Row Name 12/11/22 1458       Functional Status    Usual Activity Tolerance good    Current Activity Tolerance good       Functional Status, IADL    Medications independent    Meal Preparation independent    Housekeeping independent    Laundry independent    Shopping independent       Mental Status    General Appearance WDL WDL       Mental Status Summary    Recent Changes in Mental Status/Cognitive Functioning no changes                      Becky S. [place], RN

## 2022-12-11 NOTE — ANESTHESIA POSTPROCEDURE EVALUATION
"Patient: Flako Coreas    Procedure Summary     Date: 12/11/22 Room / Location:  [facility]    Anesthesia Start: 0935 Anesthesia Stop: 0959    Procedure: COLONOSCOPY INTO CECUM WITH HOT SNARE POLYPECTOMY Diagnosis:       Gastrointestinal hemorrhage, unspecified gastrointestinal hemorrhage type      (Gastrointestinal hemorrhage, unspecified gastrointestinal hemorrhage type [K92.2])    Surgeons: Albert Gallo MD Provider: Ayo Smith MD    Anesthesia Type: MAC ASA Status: 3          Anesthesia Type: MAC    Vitals  Vitals Value Taken Time   /77 12/11/22 0959   Temp     Pulse 81 12/11/22 0959   Resp 17 12/11/22 0959   SpO2 96 % 12/11/22 0959           Post Anesthesia Care and Evaluation    Patient location during evaluation: PHASE II  Patient participation: waiting for patient participation  Level of consciousness: sleepy but conscious  Pain management: adequate    Airway patency: patent  Anesthetic complications: No anesthetic complications    Cardiovascular status: acceptable  Respiratory status: acceptable  Hydration status: acceptable    Comments: /77 (BP Location: Left arm, Patient Position: Lying)   Pulse 81   Temp 36.5 °C (97.7 °F) (Oral)   Resp 17   Ht 188 cm (74\")   Wt 102 kg (224 lb 8 oz)   SpO2 96%   BMI 28.82 kg/m²         "

## 2022-12-11 NOTE — PROGRESS NOTES
[facility] Progress Note      Patient Name: Flako Coreas  : 1965  MRN: [id number]  Primary Care Physician:  Akash Rodriguez Jr.,   Date of admission: 2022    Subjective     Interval History:   Epigastric and substernal pain is gone, having some crampy llq pain, nervous over upcoming colonoscopy    Review of Systems:   14 point review of systems is otherwise negative except for mentioned above on HPI    Objective     Vitals:   Temp:  [97.7 °F (36.5 °C)-98.5 °F (36.9 °C)] 97.7 °F (36.5 °C)  Heart Rate:  [59-87] 60  Resp:  [15-16] 16  BP: (116-178)/() 162/104  Flow (L/min):  [10] 10    Intake/Output Summary (Last 24 hours) at 2022 0919  Last data filed at 12/10/2022 1500  Gross per 24 hour   Intake 320 ml   Output --   Net 320 ml       Physical Exam:    General Appearance: alert, oriented x 3, no acute distress   Skin: warm and dry  HEENT: oral mucosa normal, nonicteric sclera  Neck: supple, no JVD  Lungs: CTA  Heart: RRR, normal S1 and S2  Abdomen: soft, nontender, nondistended  : no palpable bladder  Extremities: no edema, cyanosis or clubbing  Neuro: normal speech and mental status     Scheduled Meds:     amLODIPine, 10 mg, Oral, Daily  hydrALAZINE, 50 mg, Oral, Q8H  lisinopril, 20 mg, Oral, BID  pantoprazole, 40 mg, Oral, BID AC  rosuvastatin, 10 mg, Oral, Nightly  sucralfate, 1 g, Oral, 4x Daily AC & at Bedtime  vilazodone, 40 mg, Oral, Daily      IV Meds:   sodium chloride, 30 mL/hr, Last Rate: Stopped (12/10/22 1057)  sodium chloride, 30 mL/hr, Last Rate: 30 mL/hr (22 0853)        Results Reviewed:   I have personally reviewed the results from the time of this admission to 2022 09:19 EST     Results from last 7 days   Lab Units 22  0746 12/10/22  0805 22  0759 22  0653   SODIUM mmol/L 138 139 140 138   POTASSIUM mmol/L 3.5 3.6 3.7 3.6   CHLORIDE mmol/L 104 103 103 101   CO2 mmol/L 22.5 23.4 26.4 26.0   BUN mg/dL  11 11 12 12   CREATININE mg/dL 0.95 0.84 0.93 0.84   CALCIUM mg/dL 9.2 8.8 8.8 8.8   BILIRUBIN mg/dL 0.7  --   --  0.5   ALK PHOS U/L 78  --   --  74   ALT (SGPT) U/L 25  --   --  20   AST (SGOT) U/L 22  --   --  17   GLUCOSE mg/dL 100* 93 98 110*     Estimated Creatinine Clearance: 112 mL/min (by C-G formula based on SCr of 0.95 mg/dL).  Results from last 7 days   Lab Units 12/10/22  0805   PHOSPHORUS mg/dL 3.2         Results from last 7 days   Lab Units 12/11/22  0746 12/10/22  2016 12/10/22  0805 12/09/22 2011 12/09/22  0759 12/08/22 2003 12/08/22  0653   WBC 10*3/mm3 7.26  --  7.07  --  6.14  --  6.27   HEMOGLOBIN g/dL 14.8 15.1 14.7 14.2 13.9  13.9   < > 12.1*   PLATELETS 10*3/mm3 236  --  199  --  201  --  186    < > = values in this interval not displayed.     Results from last 7 days   Lab Units 12/11/22  0746   INR  0.94       Assessment / Plan     ASSESSMENT:  1. Accelerated hypertension-renal artery duplex was normal, other secondary cause tests are pending again today; overally control is slowly better; he is nervous about taking more hydralazine, I talked him in to more  2. Abdominal pain-suspect functional, dr Gallo is seeing    PLAN:  1. Increase hydralazine gently again today  2. Continue other medicines  3. Await secondary cause eval results    Thank you for involving us in the care of Flako Coreas.  Please feel free to call with any questions.    Berhane Jimenez MD  12/11/22  09:19 [facility]    [facility]  [phone]

## 2022-12-12 VITALS
HEART RATE: 77 BPM | WEIGHT: 224.5 LBS | SYSTOLIC BLOOD PRESSURE: 166 MMHG | BODY MASS INDEX: 28.81 KG/M2 | OXYGEN SATURATION: 93 % | TEMPERATURE: 97.9 F | HEIGHT: 74 IN | DIASTOLIC BLOOD PRESSURE: 92 MMHG | RESPIRATION RATE: 18 BRPM

## 2022-12-12 PROBLEM — K92.2 GASTROINTESTINAL HEMORRHAGE: Status: RESOLVED | Noted: 2022-12-08 | Resolved: 2022-12-12

## 2022-12-12 PROBLEM — R10.84 GENERALIZED ABDOMINAL PAIN: Status: RESOLVED | Noted: 2022-12-08 | Resolved: 2022-12-12

## 2022-12-12 LAB
ANION GAP SERPL CALCULATED.3IONS-SCNC: 12.1 MMOL/L (ref 5–15)
BUN SERPL-MCNC: 13 MG/DL (ref 6–20)
BUN/CREAT SERPL: 14.4 (ref 7–25)
CALCIUM SPEC-SCNC: 9.1 MG/DL (ref 8.6–10.5)
CHLORIDE SERPL-SCNC: 105 MMOL/L (ref 98–107)
CO2 SERPL-SCNC: 22.9 MMOL/L (ref 22–29)
CREAT SERPL-MCNC: 0.9 MG/DL (ref 0.76–1.27)
DEPRECATED RDW RBC AUTO: 47.1 FL (ref 37–54)
EGFRCR SERPLBLD CKD-EPI 2021: 100.9 ML/MIN/1.73
ERYTHROCYTE [DISTWIDTH] IN BLOOD BY AUTOMATED COUNT: 14.8 % (ref 12.3–15.4)
GLUCOSE SERPL-MCNC: 100 MG/DL (ref 65–99)
HCT VFR BLD AUTO: 45.5 % (ref 37.5–51)
HGB BLD-MCNC: 15.4 G/DL (ref 13–17.7)
MCH RBC QN AUTO: 29.1 PG (ref 26.6–33)
MCHC RBC AUTO-ENTMCNC: 33.8 G/DL (ref 31.5–35.7)
MCV RBC AUTO: 86 FL (ref 79–97)
PLATELET # BLD AUTO: 237 10*3/MM3 (ref 140–450)
PMV BLD AUTO: 9.8 FL (ref 6–12)
POTASSIUM SERPL-SCNC: 3.4 MMOL/L (ref 3.5–5.2)
RBC # BLD AUTO: 5.29 10*6/MM3 (ref 4.14–5.8)
SODIUM SERPL-SCNC: 140 MMOL/L (ref 136–145)
WBC NRBC COR # BLD: 8.3 10*3/MM3 (ref 3.4–10.8)

## 2022-12-12 PROCEDURE — 80048 BASIC METABOLIC PNL TOTAL CA: CPT | Performed by: INTERNAL MEDICINE

## 2022-12-12 PROCEDURE — 85027 COMPLETE CBC AUTOMATED: CPT | Performed by: INTERNAL MEDICINE

## 2022-12-12 RX ORDER — LISINOPRIL 40 MG/1
40 TABLET ORAL 2 TIMES DAILY
Qty: 60 TABLET | Refills: 0 | Status: SHIPPED | OUTPATIENT
Start: 2022-12-12

## 2022-12-12 RX ORDER — LISINOPRIL 40 MG/1
40 TABLET ORAL 2 TIMES DAILY
Status: DISCONTINUED | OUTPATIENT
Start: 2022-12-12 | End: 2022-12-12 | Stop reason: HOSPADM

## 2022-12-12 RX ORDER — POTASSIUM CHLORIDE 750 MG/1
40 TABLET, FILM COATED, EXTENDED RELEASE ORAL ONCE
Status: COMPLETED | OUTPATIENT
Start: 2022-12-12 | End: 2022-12-12

## 2022-12-12 RX ORDER — PANTOPRAZOLE SODIUM 40 MG/1
40 TABLET, DELAYED RELEASE ORAL
Qty: 60 TABLET | Refills: 0 | Status: SHIPPED | OUTPATIENT
Start: 2022-12-12

## 2022-12-12 RX ORDER — POTASSIUM CHLORIDE 750 MG/1
40 TABLET, FILM COATED, EXTENDED RELEASE ORAL ONCE
Status: DISCONTINUED | OUTPATIENT
Start: 2022-12-12 | End: 2022-12-12

## 2022-12-12 RX ORDER — HYDRALAZINE HYDROCHLORIDE 50 MG/1
50 TABLET, FILM COATED ORAL EVERY 8 HOURS SCHEDULED
Qty: 90 TABLET | Refills: 0 | Status: SHIPPED | OUTPATIENT
Start: 2022-12-12 | End: 2023-01-11

## 2022-12-12 RX ADMIN — HYDRALAZINE HYDROCHLORIDE 50 MG: 50 TABLET, FILM COATED ORAL at 06:10

## 2022-12-12 RX ADMIN — AMLODIPINE BESYLATE 10 MG: 10 TABLET ORAL at 09:03

## 2022-12-12 RX ADMIN — LISINOPRIL 20 MG: 20 TABLET ORAL at 09:03

## 2022-12-12 RX ADMIN — SUCRALFATE 1 G: 1 TABLET ORAL at 06:10

## 2022-12-12 RX ADMIN — POTASSIUM CHLORIDE 40 MEQ: 750 TABLET, EXTENDED RELEASE ORAL at 12:00

## 2022-12-12 RX ADMIN — VILAZODONE HYDROCHLORIDE 40 MG: 40 TABLET, FILM COATED ORAL at 09:03

## 2022-12-12 RX ADMIN — PANTOPRAZOLE SODIUM 40 MG: 40 TABLET, DELAYED RELEASE ORAL at 06:10

## 2022-12-12 NOTE — DISCHARGE SUMMARY
Patient Name: Flako Coreas  : 1965  MRN: [id number]    Date of Admission: 2022  Date of Discharge:  2022  Primary Care Physician: Akash Rodriguez Jr., DO      Chief Complaint:   Abdominal Pain      Discharge Diagnoses     Active Hospital Problems    Diagnosis  POA   • History of esophagitis [Z87.19]  Not Applicable   • Lepe's esophagus without dysplasia [K22.70]  Yes   • GERD (gastroesophageal reflux disease) [K21.9]  Yes   • Diverticulosis [K57.90]  Yes   • Mixed hyperlipidemia [E78.2]  Yes   • Essential hypertension [I10]  Yes   • Mixed anxiety depressive disorder [F41.8]  Yes      Resolved Hospital Problems    Diagnosis Date Resolved POA   • **Generalized abdominal pain [R10.84] 2022 Yes   • Gastrointestinal hemorrhage [K92.2] 2022 Yes        Hospital Course     Mr. Coreas is a 57 y.o. male with a history of Lepe's esophagus, GERD, HTN that presented with abdominal pain and possible GIB.  CT abdomen pelvis performed in the  was unremarkable.  He was also noted to have elevated blood pressure.  He had a slight decline in his hemoglobin from 15-12 on admission.  GI performed an EGD demonstrating Lepe's esophagus.  Colonoscopy yesterday was essentially unremarkable except for colon polyp with biopsy pending.  He has no evidence of acute blood loss his hemoglobin has remained stable.  His abdominal pain is also improved.  Patient is recommended to stop all NSAID use.  He is having early satiety and I recommend he follow-up with his established gastroenterologist for possible gastric emptying study.  Nephrology has been following for his hypertension with a negative renal artery scan.  His Norvasc and lisinopril has been continued but an increased dose at discharge.  patient is also being discharged on increased dose of hydralazine.  He is stable for discharge today to home to follow-up with his PCP and gastroenterologist.         Day of Discharge      Subjective:    Tolerating diet.  Abdominal pain improved no nausea vomiting.    Physical Exam:  Temp:  [97.8 °F (36.6 °C)-98.2 °F (36.8 °C)] 97.9 °F (36.6 °C)  Heart Rate:  [64-85] 77  Resp:  [16-18] 18  BP: (138-178)/() 166/92  Body mass index is 28.82 kg/m².  Physical Exam  Vitals reviewed.   Constitutional:       Appearance: Normal appearance. He is well-developed.   HENT:      Head: Normocephalic and atraumatic.   Cardiovascular:      Rate and Rhythm: Normal rate and regular rhythm.   Abdominal:      General: Bowel sounds are normal. There is no distension.      Palpations: Abdomen is soft. There is no mass.      Tenderness: There is no abdominal tenderness.      Hernia: No hernia is present.   Skin:     General: Skin is warm and dry.   Neurological:      Mental Status: He is alert and oriented to person, place, and time.   Psychiatric:         Behavior: Behavior normal.         Thought Content: Thought content normal.         Judgment: Judgment normal.         Consultants     Consult Orders (all) (From admission, onward)     Start     Ordered    12/09/22 0957  Inpatient Nephrology Consult  Once        Specialty:  Nephrology  Provider:  Murphy Garrison MD    12/09/22 0957    12/09/22 0151  Inpatient Gastroenterology Consult  Once        Specialty:  Gastroenterology  Provider:  Margo Youssef MD    12/09/22 0153    12/08/22 1205  Inpatient Gastroenterology Consult  Once,   Status:  Canceled        Specialty:  Gastroenterology  Provider:  (Not yet assigned)    12/08/22 1204    12/08/22 1022  [facility] (on-call MD unless specified) Details  Once        Specialty:  Hospitalist  Provider:  Alden Choudhury MD    12/08/22 1021    12/08/22 1002  IP General Consult (Use specialty-specific consult if known)  Once        Provider:  Akash Rodriguez Jr., DO    12/08/22 1001              Procedures     COLONOSCOPY INTO CECUM WITH HOT SNARE POLYPECTOMY      Imaging Results (All)     Procedure Component  Value Units Date/Time    CT Head Without Contrast [[id number]] Collected: 12/08/22 2035     Updated: 12/08/22 2055    Narrative:      NONCONTRAST HEAD CT     HISTORY: Hypertension and headache.     TECHNIQUE: Noncontrast head CT correlated with brain MRI 12/24/2018.     FINDINGS: The ventricles are normal in caliber. The brain parenchyma,  extra-axial spaces, and the bones of the skull appear normal. Paranasal  sinuses are clear.     Radiation dose reduction techniques were utilized, including automated  exposure control and exposure modulation based on body size.       Impression:      Negative.     This report was finalized on 12/8/2022 8:52 PM by Dr. Pk Oropeza M.D.       CT Angiogram Chest [[id number]] Collected: 12/08/22 0942     Updated: 12/08/22 1001    Narrative:      CT ANGIOGRAM CHEST-     Radiation dose reduction techniques were utilized, including automated  exposure control and exposure modulation based on body size. CT scan of  the chest performed with intravenous contrast, pulmonary embolus  protocol to include coronal, sagittal and three-dimensional  reconstruction.     CLINICAL INFORMATION: Right-sided upper abdomen and chest pain.     FINDINGS: No pulmonary embolus. Ectasia of the thoracic aorta,  dilatation of the ascending portion, measuring 4.3 cm in diameter. No  indication of dissection. The esophagus is satisfactory in appearance.  Cardiac size upper normal, no pericardial abnormality. No mediastinal or  hilar mass/adenopathy. There are several small mediastinal lymph nodes  seen. There is a 2 cm diverticulum incidentally noted within the right  upper quadrant. No atypical features. Linear areas of scarring versus  discoid atelectasis along each costophrenic sulcus. No pleural effusion,  suspicious pulmonary lesion or infiltrate/consolidation.     CONCLUSION:  1. No acute pulmonary process is demonstrated, specifically no pulmonary  embolus.  2. Dilatation of the ascending thoracic aorta,  diameter is 4.3 cm.  3. Typical-appearing duodenal diverticulum noted incidentally.              This report was finalized on 12/8/2022 9:58 AM by Dr. Tomás Pham M.D.           Results for orders placed during the hospital encounter of 12/08/22    Duplex Renal Artery - Bilateral Complete CAR    Interpretation Summary  •  Normal right renal artery.  •  Normal left renal artery.      Pertinent Labs     Results from last 7 days   Lab Units 12/12/22  0647 12/11/22  0746 12/10/22  2016 12/10/22  0805 12/09/22  2011 12/09/22  0759   WBC 10*3/mm3 8.30 7.26  --  7.07  --  6.14   HEMOGLOBIN g/dL 15.4 14.8 15.1 14.7   < > 13.9  13.9   PLATELETS 10*3/mm3 237 236  --  199  --  201    < > = values in this interval not displayed.     Results from last 7 days   Lab Units 12/12/22  0647 12/11/22  0746 12/10/22  0805 12/09/22  0759   SODIUM mmol/L 140 138 139 140   POTASSIUM mmol/L 3.4* 3.5 3.6 3.7   CHLORIDE mmol/L 105 104 103 103   CO2 mmol/L 22.9 22.5 23.4 26.4   BUN mg/dL 13 11 11 12   CREATININE mg/dL 0.90 0.95 0.84 0.93   GLUCOSE mg/dL 100* 100* 93 98   EGFR mL/min/1.73 100.9 94.5 103.0 97.0     Results from last 7 days   Lab Units 12/11/22  0746 12/10/22  0805 12/08/22  0653   ALBUMIN g/dL 4.40 4.00 4.20   BILIRUBIN mg/dL 0.7  --  0.5   ALK PHOS U/L 78  --  74   AST (SGOT) U/L 22  --  17   ALT (SGPT) U/L 25  --  20     Results from last 7 days   Lab Units 12/12/22  0647 12/11/22  0746 12/10/22  0805 12/09/22  0759 12/08/22  0653   CALCIUM mg/dL 9.1 9.2 8.8 8.8 8.8   ALBUMIN g/dL  --  4.40 4.00  --  4.20   PHOSPHORUS mg/dL  --   --  3.2  --   --      Results from last 7 days   Lab Units 12/09/22  0759 12/08/22  0653   LIPASE U/L 34 220*     Results from last 7 days   Lab Units 12/08/22  0653   TROPONIN T ng/mL <0.010           Invalid input(s): LDLCALC      Results from last 7 days   Lab Units 12/08/22  0945   COVID19  Not Detected       Test Results Pending at Discharge     Pending Labs     Order Current Status     Aldosterone / Renin Ratio In process    Metanephrines, Frac. Free, Plasma In process    Tissue Pathology Exam In process          Discharge Details        Discharge Medications      Changes to Medications      Instructions Start Date   lisinopril 40 MG tablet  Commonly known as: PRINIVIL,ZESTRIL  What changed:   · medication strength  · how much to take   40 mg, Oral, 2 Times Daily      pantoprazole 40 MG EC tablet  Commonly known as: PROTONIX  What changed: when to take this   40 mg, Oral, 2 Times Daily Before Meals      rosuvastatin 10 MG tablet  Commonly known as: Crestor  What changed: when to take this   10 mg, Oral, Daily         Continue These Medications      Instructions Start Date   amLODIPine 10 MG tablet  Commonly known as: NORVASC   10 mg, Oral, Daily      ondansetron 4 MG tablet  Commonly known as: Zofran   4 mg, Oral, 4 Times Daily PRN      sucralfate 1 g tablet  Commonly known as: CARAFATE   1 g, Oral, 4 Times Daily      vilazodone 40 MG tablet tablet  Commonly known as: Viibryd   40 mg, Oral, Daily         Stop These Medications    ibuprofen 600 MG tablet  Commonly known as: ADVILMOTRIN        ASK your doctor about these medications      Instructions Start Date   amitriptyline 25 MG tablet  Commonly known as: ELAVIL   25 mg, Oral, Nightly      hydrALAZINE 50 MG tablet  Commonly known as: APRESOLINE  Ask about: Which instructions should I use?   50 mg, Oral      hydrALAZINE 50 MG tablet  Commonly known as: APRESOLINE  Ask about: Which instructions should I use?   50 mg, Oral, Every 8 Hours Scheduled             No Known Allergies    Discharge Disposition:  Home or Self Care      Discharge Diet:  Diet Order   Procedures   • Diet: Gastrointestinal Diets; Fiber-Restricted; Texture: Regular Texture (IDDSI 7); Fluid Consistency: Thin (IDDSI 0)       Discharge Activity:   Activity Instructions     Activity as Tolerated            CODE STATUS:    Code Status and Medical Interventions:   Ordered at:  12/08/22 1407     Level Of Support Discussed With:    Patient     Code Status (Patient has no pulse and is not breathing):    CPR (Attempt to Resuscitate)     Medical Interventions (Patient has pulse or is breathing):    Full Support       Future Appointments   Date Time Provider Department Center   12/16/2022 10:20 AM Akash Rodriguez Jr., DO [facility]   5/12/2023  8:20 AM Akash Rodriguez Jr., DO [facility]      Follow-up Information     Chani Springer MD Follow up in 3 month(s).    Specialty: Gastroenterology  Contact information:  [address]  [address] [address]  [facility] [id number] [phone]             Akash Rodriguez Jr., DO .    Specialties: Sports Medicine, Family Medicine, Emergency Medicine  Contact information:  [address]  [facility] [id number] [phone]             Marquise Cotton MD. Schedule an appointment as soon as possible for a visit in 1 week(s).    Specialty: Nephrology  Contact information:  [address] [facility] [address]  [facility] [id number] [phone]                         Time Spent on Discharge:  Greater than 38 minutes      JANEEN Snow  [facility]  12/12/22  12:23 EST

## 2022-12-12 NOTE — PROGRESS NOTES
[facility] Progress Note      Patient Name: Flako Coreas  : 1965  MRN: [id number]  Primary Care Physician:  Akash Rodriguez Jr.,   Date of admission: 2022    Subjective     Interval History:     Patient lying in bed comfortable  He underwent colonoscopy yesterday without any complications  Denies any chest pain, shortness of palpitations  Urinating spontaneously    Review of Systems:   14 point review of systems is otherwise negative except for mentioned above on HPI    Objective     Vitals:   Temp:  [97.8 °F (36.6 °C)-98.2 °F (36.8 °C)] 97.9 °F (36.6 °C)  Heart Rate:  [64-85] 77  Resp:  [14-18] 18  BP: (113-178)/() 166/92  Flow (L/min):  [2] 2    Intake/Output Summary (Last 24 hours) at 2022 0938  Last data filed at 2022 1700  Gross per 24 hour   Intake 920 ml   Output --   Net 920 ml       Physical Exam:    General Appearance: alert, oriented x 3, no acute distress   Skin: warm and dry  HEENT: oral mucosa normal, nonicteric sclera  Neck: supple, no JVD  Lungs: CTA  Heart: RRR, normal S1 and S2  Abdomen: soft, nontender, nondistended  : no palpable bladder  Extremities: no edema, cyanosis or clubbing  Neuro: normal speech and mental status     Scheduled Meds:     amLODIPine, 10 mg, Oral, Daily  hydrALAZINE, 50 mg, Oral, Q8H  lisinopril, 40 mg, Oral, BID  pantoprazole, 40 mg, Oral, BID AC  rosuvastatin, 10 mg, Oral, Nightly  sucralfate, 1 g, Oral, 4x Daily AC & at Bedtime  vilazodone, 40 mg, Oral, Daily      IV Meds:   sodium chloride, 30 mL/hr, Last Rate: Stopped (12/10/22 1057)  sodium chloride, 30 mL/hr, Last Rate: Stopped (22 1358)        Results Reviewed:   I have personally reviewed the results from the time of this admission to 2022 09:38 EST     Results from last 7 days   Lab Units 22  0647 22  0746 12/10/22  0805 22  0759 22  0653   SODIUM mmol/L 140 138 139   < > 138   POTASSIUM mmol/L 3.4* 3.5  3.6   < > 3.6   CHLORIDE mmol/L 105 104 103   < > 101   CO2 mmol/L 22.9 22.5 23.4   < > 26.0   BUN mg/dL 13 11 11   < > 12   CREATININE mg/dL 0.90 0.95 0.84   < > 0.84   CALCIUM mg/dL 9.1 9.2 8.8   < > 8.8   BILIRUBIN mg/dL  --  0.7  --   --  0.5   ALK PHOS U/L  --  78  --   --  74   ALT (SGPT) U/L  --  25  --   --  20   AST (SGOT) U/L  --  22  --   --  17   GLUCOSE mg/dL 100* 100* 93   < > 110*    < > = values in this interval not displayed.     Estimated Creatinine Clearance: 118.2 mL/min (by C-G formula based on SCr of 0.9 mg/dL).  Results from last 7 days   Lab Units 12/10/22  0805   PHOSPHORUS mg/dL 3.2         Results from last 7 days   Lab Units 12/12/22  0647 12/11/22  0746 12/10/22  2016 12/10/22  0805 12/09/22 2011 12/09/22  0759 12/08/22 2003 12/08/22  0653   WBC 10*3/mm3 8.30 7.26  --  7.07  --  6.14  --  6.27   HEMOGLOBIN g/dL 15.4 14.8 15.1 14.7 14.2 13.9  13.9   < > 12.1*   PLATELETS 10*3/mm3 237 236  --  199  --  201  --  186    < > = values in this interval not displayed.     Results from last 7 days   Lab Units 12/11/22  0746   INR  0.94       Assessment / Plan     ASSESSMENT:  1. Accelerated hypertension-renal artery duplex was normal, adjust medication accordingly patient can complete work and further recommendation as an outpatient  2. Abdominal pain-suspect functional, status post colonoscopy 12/11/2022 followed by gastroenterology dr Gallo    PLAN:  1.  We will increase lisinopril from 20 mg to 40 mg twice a day  2.  Patient hesitant to increase hydralazine, will continue amlodipine  3.  Patient can be discharged and will continue to adjust blood pressure as an outpatient    Thank you for lungs was performed patient care    Thank you for involving us in the care of Flako Coreas.  Please feel free to call with any questions.    Marquise Cotton MD  12/12/22  09:38 EST    [facility]  [phone]

## 2022-12-12 NOTE — CASE MANAGEMENT/SOCIAL WORK
Continued Stay Note  [facility]     Patient Name: Flako Coreas  MRN: [id number]  Today's Date: 12/12/2022    Admit Date: 12/8/2022    Plan: Home   Discharge Plan     Row Name 12/12/22 1125       Plan    Plan Home    Patient/Family in Agreement with Plan yes    Plan Comments Spoke with pt at bedside, he denies needs, confirms he has a ride. -Rafaela HAYNES    Final Discharge Disposition Code 01 - home or self-care    Final Note home no needs               Discharge Codes    No documentation.               Expected Discharge Date and Time     Expected Discharge Date Expected Discharge Time    Dec 12, 2022             Rafaela Tejeda RN

## 2022-12-12 NOTE — PAYOR COMM NOTE
"Flako Coreas (57 y.o. Male)     PLEASE SEE ATTACHED FOR INPT CONTINUED STAY AUTH.     REF#[id number]    PLEASE CALL   OR  [phone]    THANK YOU    CAL TIJERINA LPN CCP    Date of Birth   1965    Social Security Number       Address   [address] [place] [address] [address] [place] [address]    Home Phone   [phone]    MRN   [id number]       Hinduism   Gnosticist    Marital Status   Single                            Admission Date   12/8/22    Admission Type   Emergency    Admitting Provider   Alden Choudhury MD    Attending Provider   Ayo Leon MD    Department, Room/Bed   [address], [address]/1       Discharge Date       Discharge Disposition   Home or Self Care    Discharge Destination                               Attending Provider: Ayo Leon MD    Allergies: No Known Allergies    Isolation: None   Infection: None   Code Status: CPR    Ht: 188 cm (74\")   Wt: 102 kg (224 lb 8 oz)    Admission Cmt: None   Principal Problem: Generalized abdominal pain [R10.84]                 Active Insurance as of 12/8/2022     Primary Coverage     Payor Plan Insurance Group Employer/Plan Group    [employer] BLUE CROSS [employer]UE [employer]OSS BLUE [employer]IELD PPO [id number]     Payor Plan Address Payor Plan Phone Number Payor Plan Fax Number Effective Dates    [address] [phone]  8/1/2022 - None Entered    [place] [address]       Subscriber Name Subscriber Birth Date Member ID       FLAKO COREAS 1965 [id number]                 Emergency Contacts      (Rel.) Home Phone Work Phone Mobile Phone    Pk Coreas (Brother) [phone] -- --    Flako Coreas (Son) [phone] -- --            Oxygen Therapy (last 2 days)     Date/Time SpO2 Device (Oxygen Therapy) Flow (L/min) Oxygen Concentration (%) ETCO2 (mmHg)    12/12/22 0700 93 room air -- -- --    12/12/22 0453 -- room air -- -- --    12/11/22 2317 95 room air -- -- --    12/11/22 2115 -- " room air -- -- --    12/11/22 2113 94 room air -- -- --    12/11/22 1934 96 room air -- -- --    12/11/22 1300 94 room air -- -- --    12/11/22 1048 96 room air -- -- --    12/11/22 1019 97 room air -- -- --    12/11/22 1010 96 room air -- -- --    12/11/22 1008 99 room air -- -- --    12/11/22 0959 96 nasal cannula 2 -- --    12/11/22 0848 93 room air -- -- --    12/11/22 0700 92 room air -- -- --    12/11/22 0027 -- room air -- -- --    12/10/22 2247 94 room air -- -- --    12/10/22 2105 -- room air -- -- --    12/10/22 1906 95 room air -- -- --    12/10/22 1345 93 room air -- -- --    12/10/22 1058 93 room air -- -- --    12/10/22 1049 94 room air -- -- --    12/10/22 1040 98 nonrebreather mask 10 -- --    12/10/22 0936 92 room air -- -- --    12/10/22 0853 -- room air -- -- --    12/10/22 0737 93 room air -- -- --    12/10/22 0409 -- room air -- -- --        Intake & Output (last 2 days)       12/10 0701 12/11 0700 12/11 0701 12/12 0700 12/12 0701  12/13 0700    P.O. 320 720     I.V. (mL/kg)  200 (2)     Total Intake(mL/kg) 320 (3.1) 920 (9)     Urine (mL/kg/hr) 300 (0.1)      Total Output 300      Net +20 +920                Lines, Drains & Airways     Active LDAs     Name Placement date Placement time Site Days    Peripheral IV 12/08/22 0652 Left;Posterior Hand 12/08/22  0652  Hand  4    Peripheral IV 12/08/22 0841 Left Antecubital 12/08/22  0841  Antecubital  4                  Medication Administration Report for Flako Coreas as of 12/12/22 1049   Legend:    Given Hold Not Given Due Canceled Entry Other Actions    Time Time (Time) Time  Time-Action       Discontinued     Completed     Future     MAR Hold     Linked           Medications 12/10/22 12/11/22 12/12/22    acetaminophen (TYLENOL) tablet 650 mg  Dose: 650 mg  Freq: Every 4 Hours PRN Route: PO  PRN Reason: Mild Pain  Start: 12/08/22 1205   Admin Instructions:   Based on patient request - if ordered for moderate or severe pain, provider allows  for administration of a medication prescribed for a lower pain scale.  Do not exceed 4 grams of acetaminophen in a 24 hr period. Max dose of 2gm for AST/ALT greater than 120 units/L    If given for fever, use fever parameter: fever greater than 100.4 °F.    If given for pain, use the following pain scale:   Mild Pain = Pain Score of 1-3, CPOT 1-2  Moderate Pain = Pain Score of 4-6, CPOT 3-4  Severe Pain = Pain Score of 7-10, CPOT 5-8         Or  acetaminophen (TYLENOL) 160 MG/5ML solution 650 mg  Dose: 650 mg  Freq: Every 4 Hours PRN Route: PO  PRN Reason: Mild Pain  Start: 12/08/22 1205   Admin Instructions:   Based on patient request - if ordered for moderate or severe pain, provider allows for administration of a medication prescribed for a lower pain scale.  Do not exceed 4 grams of acetaminophen in a 24 hr period. Max dose of 2gm for AST/ALT greater than 120 units/L    If given for fever, use fever parameter: fever greater than 100.4 °F.    If given for pain, use the following pain scale:   Mild Pain = Pain Score of 1-3, CPOT 1-2  Moderate Pain = Pain Score of 4-6, CPOT 3-4  Severe Pain = Pain Score of 7-10, CPOT 5-8         Or  acetaminophen (TYLENOL) suppository 650 mg  Dose: 650 mg  Freq: Every 4 Hours PRN Route: RE  PRN Reason: Mild Pain  Start: 12/08/22 1205   Admin Instructions:   Based on patient request - if ordered for moderate or severe pain, provider allows for administration of a medication prescribed for a lower pain scale.  Do not exceed 4 grams of acetaminophen in a 24 hr period. Max dose of 2gm for AST/ALT greater than 120 units/L    If given for fever, use fever parameter: fever greater than 100.4 °F.    If given for pain, use the following pain scale:   Mild Pain = Pain Score of 1-3, CPOT 1-2  Moderate Pain = Pain Score of 4-6, CPOT 3-4  Severe Pain = Pain Score of 7-10, CPOT 5-8          amLODIPine (NORVASC) tablet 10 mg  Dose: 10 mg  Freq: Daily Route: PO  Start: 12/08/22 1930   Admin  Instructions:   Caution: Look alike/sound alike drug alert. Avoid grapefruit juice.    1130-Given          1049-Given     1200-Canceled Entry         0903-Given             cloNIDine (CATAPRES) tablet 0.1 mg  Dose: 0.1 mg  Freq: Every 8 Hours PRN Route: PO  PRN Reason: High Blood Pressure  PRN Comment: for sbp >160  Start: 12/11/22 0918   Admin Instructions:   Caution: Look alike/sound alike drug alert. Please read the label.  Caution: Look alike/sound alike drug alert.     1554-Given              hydrALAZINE (APRESOLINE) injection 10 mg  Dose: 10 mg  Freq: Every 6 Hours PRN Route: IV  PRN Reason: High Blood Pressure  PRN Comment: for sbp >160  Start: 12/11/22 0919   Admin Instructions:   As needed for SBP greater than 160  Caution: Look alike/sound alike drug alert     1740-Given              hydrALAZINE (APRESOLINE) tablet 10 mg  Dose: 10 mg  Freq: Every 4 Hours PRN Route: PO  PRN Comment: give if diastolic greater than 110, systolic greater than 190  Start: 12/09/22 0004   Admin Instructions:   Caution: Look alike/sound alike drug alert          hydrALAZINE (APRESOLINE) tablet 50 mg  Dose: 50 mg  Freq: Every 8 Hours Scheduled Route: PO  Start: 12/11/22 1400   Admin Instructions:   Caution: Look alike/sound alike drug alert     1357-Given     2214-Given         0610-Given     1400     2200           HYDROcodone-acetaminophen (NORCO) 5-325 MG per tablet 1 tablet  Dose: 1 tablet  Freq: Every 6 Hours PRN Route: PO  PRN Reason: Moderate Pain  Start: 12/11/22 0823   End: 12/18/22 0822   Admin Instructions:   Based on patient request - if ordered for moderate or severe pain, provider allows for administration of a medication prescribed for a lower pain scale.  [EDEN]    Do not exceed 4 grams of acetaminophen in a 24 hr period. Max dose of 2gm for AST/ALT greater than 120 units/L        If given for pain, use the following pain scale:   Mild Pain = Pain Score of 1-3, CPOT 1-2  Moderate Pain = Pain Score of 4-6, CPOT  3-4  Severe Pain = Pain Score of 7-10, CPOT 5-8          ondansetron (ZOFRAN) injection 4 mg  Dose: 4 mg  Freq: Every 6 Hours PRN Route: IV  PRN Reasons: Nausea,Vomiting  Start: 12/08/22 1205   Admin Instructions:   If BOTH ondansetron (ZOFRAN) and promethazine (PHENERGAN) are ordered use ondansetron first and THEN promethazine IF ondansetron is ineffective.          ondansetron (ZOFRAN) tablet 4 mg  Dose: 4 mg  Freq: 4 Times Daily PRN Route: PO  PRN Reasons: Nausea,Vomiting  Start: 12/08/22 1747          pantoprazole (PROTONIX) EC tablet 40 mg  Dose: 40 mg  Freq: 2 Times Daily Before Meals Route: PO  Start: 12/10/22 1730   Admin Instructions:   Swallow whole; do not crush, split, or chew.    1714-Given          1049-Given     1200-Canceled Entry     1737-Given        0610-Given     1730            rosuvastatin (CRESTOR) tablet 10 mg  Dose: 10 mg  Freq: Nightly Route: PO  Start: 12/08/22 2100   Admin Instructions:   Avoid grapefruit juice.    2117-Given          2118-Given          2100             sodium chloride 0.9 % flush 10 mL  Dose: 10 mL  Freq: As Needed Route: IV  PRN Reason: Line Care  Start: 12/08/22 0708          sodium chloride 0.9 % flush 10 mL  Dose: 10 mL  Freq: As Needed Route: IV  PRN Reason: Line Care  Start: 12/08/22 0617          sodium chloride 0.9 % infusion  Rate: 30 mL/hr Dose: 30 mL/hr  Freq: Continuous PRN Route: IV  PRN Comment: Start Prior to Procedure  Start: 12/11/22 0852     0853-New Bag     0935-Currently Infusing     0955-Paused [C]       0956-Restarted     1358-Stopped             sodium chloride 0.9 % infusion  Rate: 30 mL/hr Dose: 30 mL/hr  Freq: Continuous PRN Route: IV  PRN Comment: Start Prior to Procedure  Start: 12/10/22 0942    0942-New Bag     1024-Currently Infusing     1057-Stopped             sucralfate (CARAFATE) tablet 1 g  Dose: 1 g  Freq: 4 Times Daily Before Meals & Nightly Route: PO  Start: 12/08/22 1730   Admin Instructions:   If for oral administration, take on  empty stomach.  If for rectal enema, dissolve 1 tablet in 10 ml water. Retain enema for as long as possible or for at least 5 minutes.  For nasogastric or slurry administration:   1. Remove the cap and plunger from a 60 mL syringe   2. Place the sucralfate tablet inside the syringe   3. Replace the plunger so that minimal airspace exists around the tablet   4. Draw up approximately 20 mL water into the syringe   5. Replace the syringe cap   6. Allow the syringe to stand for ~5 minutes, shaking occasionally   7. Shake the suspension and administer directly from the syringe into the tube   **Flush tube before and after administration**    0502-Given     0730-Canceled Entry     1130-Given       1714-Given     2117-Given         (0807)-Not Given     1049-Given     1737-Given       2118-Given          0610-Given     1130     1730       2100             vilazodone (VIIBRYD) tablet 40 mg  Dose: 40 mg  Freq: Daily Route: PO  Start: 12/08/22 1930   Admin Instructions:   Take with food    1129-Given          1050-Given     1200-Canceled Entry         0903-Given            Future Medications  Medications 12/10/22 12/11/22 12/12/22       lisinopril (PRINIVIL,ZESTRIL) tablet 40 mg  Dose: 40 mg  Freq: 2 Times Daily Route: PO  Start: 12/12/22 2100      2100            Completed Medications  Medications 12/10/22 12/11/22 12/12/22       HYDROmorphone (DILAUDID) injection 0.5 mg  Dose: 0.5 mg  Freq: Once Route: IV  Start: 12/08/22 0711   End: 12/08/22 0716   Admin Instructions:   Based on patient request - if ordered for moderate or severe pain, provider allows for administration of a medication prescribed for a lower pain scale.  If given for pain, use the following pain scale:  Mild Pain = Pain Score of 1-3, CPOT 1-2  Moderate Pain = Pain Score of 4-6, CPOT 3-4  Severe Pain = Pain Score of 7-10, CPOT 5-8          HYDROmorphone (DILAUDID) injection 1 mg  Dose: 1 mg  Freq: Once Route: IV  Start: 12/08/22 1041   End: 12/08/22 1107    Admin Instructions:   Based on patient request - if ordered for moderate or severe pain, provider allows for administration of a medication prescribed for a lower pain scale.  CAution: Look alike/sound alike drug alert    If given for pain, use the following pain scale:  Mild Pain = Pain Score of 1-3, CPOT 1-2  Moderate Pain = Pain Score of 4-6, CPOT 3-4  Severe Pain = Pain Score of 7-10, CPOT 5-8          iopamidol (ISOVUE-370) 76 % injection 100 mL  Dose: 100 mL  Freq: Once in Imaging Route: IV  Start: 12/08/22 0906   End: 12/08/22 0905          labetalol (NORMODYNE,TRANDATE) injection 10 mg  Dose: 10 mg  Freq: Once Route: IV  Start: 12/08/22 1054   End: 12/08/22 1112   Admin Instructions:   As ordered  Give IV Push over 2 minutes.          ondansetron (ZOFRAN) injection 4 mg  Dose: 4 mg  Freq: Once Route: IV  Start: 12/08/22 0711   End: 12/08/22 0716          pantoprazole (PROTONIX) injection 80 mg  Dose: 80 mg  Freq: Once Route: IV  Indications of Use: Gastrointestinal (GI) Bleed  Start: 12/08/22 1053   End: 12/08/22 1112   Admin Instructions:   Dilute with 10 mL of 0.9% NaCl and give IV push over 2 minutes.          polyethylene glycol (MIRALAX) powder 0.5 bottle  Dose: 0.5 bottle  Freq: Every 12 Hours Route: PO  Start: 12/10/22 1700   End: 12/11/22 0448   Admin Instructions:   Mix with 64oz of liquid and give 1/2 solution at 5:00pm the day before colonoscopy. Give second half of the solution the day or colonosopy to be completed at least 4 hours prior to procedure.    1714-Given          0448-Given             Discontinued Medications  Medications 12/10/22 12/11/22 12/12/22       hydrALAZINE (APRESOLINE) tablet 25 mg  Dose: 25 mg  Freq: Every 8 Hours Scheduled Route: PO  Start: 12/09/22 1400   End: 12/10/22 0913   Admin Instructions:   Caution: Look alike/sound alike drug alert    0501-Given               hydrALAZINE (APRESOLINE) tablet 37.5 mg  Dose: 37.5 mg  Freq: Every 8 Hours Scheduled Route: PO  Start:  12/10/22 1445   End: 12/11/22 0919   Admin Instructions:   Caution: Look alike/sound alike drug alert    1403-Hold [C]     2116-Given         0624-Given              hydrALAZINE (APRESOLINE) tablet 50 mg  Dose: 50 mg  Freq: Every 8 Hours Scheduled Route: PO  Start: 12/10/22 1400   End: 12/10/22 1352   Admin Instructions:   Caution: Look alike/sound alike drug alert    1256-Given     1400-Canceled Entry              HYDROmorphone (DILAUDID) injection 0.5 mg  Dose: 0.5 mg  Freq: Every 3 Hours PRN Route: IV  PRN Reason: Severe Pain  Start: 12/08/22 1210   End: 12/11/22 0823   Admin Instructions:   Based on patient request - if ordered for moderate or severe pain, provider allows for administration of a medication prescribed for a lower pain scale.  If given for pain, use the following pain scale:  Mild Pain = Pain Score of 1-3, CPOT 1-2  Moderate Pain = Pain Score of 4-6, CPOT 3-4  Severe Pain = Pain Score of 7-10, CPOT 5-8    0338-Given     0853-Given     1222-Given       1938-Given          0137-Given              lisinopril (PRINIVIL,ZESTRIL) tablet 20 mg  Dose: 20 mg  Freq: 2 Times Daily Route: PO  Start: 12/08/22 2100   End: 12/12/22 0938    1129-Given     2117-Given         1049-Given     1200-Canceled Entry     2118-Given        0903-Given             pantoprazole (PROTONIX) 40 mg in 100 mL NS (VTB)  Rate: 20 mL/hr Dose: 8 mg/hr  Freq: Continuous Route: IV  Indications of Use: Gastrointestinal (GI) Bleed  Start: 12/08/22 1535   End: 12/10/22 1136    0008-New Bag     0502-New Bag     1222-Stopped             sodium chloride 0.9 % flush 10 mL  Dose: 10 mL  Freq: As Needed Route: IV  PRN Reason: Line Care  Start: 12/10/22 0942   End: 12/10/22 1136          sodium chloride 0.9 % flush 10 mL  Dose: 10 mL  Freq: Every 12 Hours Scheduled Route: IV  Start: 12/10/22 0944   End: 12/10/22 1136    1104-Canceled Entry               sodium chloride 0.9 % infusion 40 mL  Dose: 40 mL  Freq: As Needed Route: IV  PRN Reason: Line  Care  Start: 12/10/22 0942   End: 12/10/22 1136   Admin Instructions:   Following administration of an IV intermittent medication, flush line with 40mL NS at 100mL/hr.                    Operative/Procedure Notes (last 48 hours)      Procedures signed by Albert Gallo MD at 22 1002       Procedure Orders    1. COLONOSCOPY [[id number]] ordered by Albert Gallo MD at 22 09           [Media Unavailable] Scan on 2022 0936 by Albert Gallo MD: COLON          Electronically signed by Albert Gallo MD at 22 1002          Physician Progress Notes (last 48 hours)      Marquise Cotton MD at 22 0938              [facility] Progress Note      Patient Name: Flako Coreas  : 1965  MRN: [id number]  Primary Care Physician:  Akash Rodriguez Jr., DO  Date of admission: 2022    Subjective     Interval History:     Patient lying in bed comfortable  He underwent colonoscopy yesterday without any complications  Denies any chest pain, shortness of palpitations  Urinating spontaneously    Review of Systems:   14 point review of systems is otherwise negative except for mentioned above on HPI    Objective     Vitals:   Temp:  [97.8 °F (36.6 °C)-98.2 °F (36.8 °C)] 97.9 °F (36.6 °C)  Heart Rate:  [64-85] 77  Resp:  [14-18] 18  BP: (113-178)/() 166/92  Flow (L/min):  [2] 2    Intake/Output Summary (Last 24 hours) at 2022  Last data filed at 2022 1700  Gross per 24 hour   Intake 920 ml   Output --   Net 920 ml       Physical Exam:    General Appearance: alert, oriented x 3, no acute distress   Skin: warm and dry  HEENT: oral mucosa normal, nonicteric sclera  Neck: supple, no JVD  Lungs: CTA  Heart: RRR, normal S1 and S2  Abdomen: soft, nontender, nondistended  : no palpable bladder  Extremities: no edema, cyanosis or clubbing  Neuro: normal speech and mental status     Scheduled Meds:     amLODIPine, 10 mg, Oral,  Daily  hydrALAZINE, 50 mg, Oral, Q8H  lisinopril, 40 mg, Oral, BID  pantoprazole, 40 mg, Oral, BID AC  rosuvastatin, 10 mg, Oral, Nightly  sucralfate, 1 g, Oral, 4x Daily AC & at Bedtime  vilazodone, 40 mg, Oral, Daily      IV Meds:   sodium chloride, 30 mL/hr, Last Rate: Stopped (12/10/22 1057)  sodium chloride, 30 mL/hr, Last Rate: Stopped (12/11/22 1358)        Results Reviewed:   I have personally reviewed the results from the time of this admission to 12/12/2022 09:38 EST     Results from last 7 days   Lab Units 12/12/22  0647 12/11/22  0746 12/10/22  0805 12/09/22  0759 12/08/22  0653   SODIUM mmol/L 140 138 139   < > 138   POTASSIUM mmol/L 3.4* 3.5 3.6   < > 3.6   CHLORIDE mmol/L 105 104 103   < > 101   CO2 mmol/L 22.9 22.5 23.4   < > 26.0   BUN mg/dL 13 11 11   < > 12   CREATININE mg/dL 0.90 0.95 0.84   < > 0.84   CALCIUM mg/dL 9.1 9.2 8.8   < > 8.8   BILIRUBIN mg/dL  --  0.7  --   --  0.5   ALK PHOS U/L  --  78  --   --  74   ALT (SGPT) U/L  --  25  --   --  20   AST (SGOT) U/L  --  22  --   --  17   GLUCOSE mg/dL 100* 100* 93   < > 110*    < > = values in this interval not displayed.     Estimated Creatinine Clearance: 118.2 mL/min (by C-G formula based on SCr of 0.9 mg/dL).  Results from last 7 days   Lab Units 12/10/22  0805   PHOSPHORUS mg/dL 3.2         Results from last 7 days   Lab Units 12/12/22  0647 12/11/22  0746 12/10/22  2016 12/10/22  0805 12/09/22 2011 12/09/22 0759 12/08/22 2003 12/08/22  0653   WBC 10*3/mm3 8.30 7.26  --  7.07  --  6.14  --  6.27   HEMOGLOBIN g/dL 15.4 14.8 15.1 14.7 14.2 13.9  13.9   < > 12.1*   PLATELETS 10*3/mm3 237 236  --  199  --  201  --  186    < > = values in this interval not displayed.     Results from last 7 days   Lab Units 12/11/22  0746   INR  0.94       Assessment / Plan     ASSESSMENT:  1. Accelerated hypertension-renal artery duplex was normal, adjust medication accordingly patient can complete work and further recommendation as an outpatient  2.  Abdominal pain-suspect functional, status post colonoscopy 2022 followed by gastroenterology dr Gallo    PLAN:  1.  We will increase lisinopril from 20 mg to 40 mg twice a day  2.  Patient hesitant to increase hydralazine, will continue amlodipine  3.  Patient can be discharged and will continue to adjust blood pressure as an outpatient    Thank you for lungs was performed patient care    Thank you for involving us in the care of Flako Coreas.  Please feel free to call with any questions.    Marquise Cotton MD  22  09:38 EST    [facility]  [phone]       Electronically signed by Mraquise Cotton MD at 22 0940     Berhane Jimenez MD at 22 0919              [facility] Progress Note      Patient Name: Flako Coreas  : 1965  MRN: [id number]  Primary Care Physician:  Akash Rodriguez Jr.,   Date of admission: 2022    Subjective     Interval History:   Epigastric and substernal pain is gone, having some crampy llq pain, nervous over upcoming colonoscopy    Review of Systems:   14 point review of systems is otherwise negative except for mentioned above on HPI    Objective     Vitals:   Temp:  [97.7 °F (36.5 °C)-98.5 °F (36.9 °C)] 97.7 °F (36.5 °C)  Heart Rate:  [59-87] 60  Resp:  [15-16] 16  BP: (116-178)/() 162/104  Flow (L/min):  [10] 10    Intake/Output Summary (Last 24 hours) at 2022 09  Last data filed at 12/10/2022 1500  Gross per 24 hour   Intake 320 ml   Output --   Net 320 ml       Physical Exam:    General Appearance: alert, oriented x 3, no acute distress   Skin: warm and dry  HEENT: oral mucosa normal, nonicteric sclera  Neck: supple, no JVD  Lungs: CTA  Heart: RRR, normal S1 and S2  Abdomen: soft, nontender, nondistended  : no palpable bladder  Extremities: no edema, cyanosis or clubbing  Neuro: normal speech and mental status     Scheduled Meds:     amLODIPine, 10 mg, Oral,  Daily  hydrALAZINE, 50 mg, Oral, Q8H  lisinopril, 20 mg, Oral, BID  pantoprazole, 40 mg, Oral, BID AC  rosuvastatin, 10 mg, Oral, Nightly  sucralfate, 1 g, Oral, 4x Daily AC & at Bedtime  vilazodone, 40 mg, Oral, Daily      IV Meds:   sodium chloride, 30 mL/hr, Last Rate: Stopped (12/10/22 1057)  sodium chloride, 30 mL/hr, Last Rate: 30 mL/hr (12/11/22 0853)        Results Reviewed:   I have personally reviewed the results from the time of this admission to 12/11/2022 09:19 EST     Results from last 7 days   Lab Units 12/11/22  0746 12/10/22  0805 12/09/22  0759 12/08/22  0653   SODIUM mmol/L 138 139 140 138   POTASSIUM mmol/L 3.5 3.6 3.7 3.6   CHLORIDE mmol/L 104 103 103 101   CO2 mmol/L 22.5 23.4 26.4 26.0   BUN mg/dL 11 11 12 12   CREATININE mg/dL 0.95 0.84 0.93 0.84   CALCIUM mg/dL 9.2 8.8 8.8 8.8   BILIRUBIN mg/dL 0.7  --   --  0.5   ALK PHOS U/L 78  --   --  74   ALT (SGPT) U/L 25  --   --  20   AST (SGOT) U/L 22  --   --  17   GLUCOSE mg/dL 100* 93 98 110*     Estimated Creatinine Clearance: 112 mL/min (by C-G formula based on SCr of 0.95 mg/dL).  Results from last 7 days   Lab Units 12/10/22  0805   PHOSPHORUS mg/dL 3.2         Results from last 7 days   Lab Units 12/11/22  0746 12/10/22  2016 12/10/22  0805 12/09/22 2011 12/09/22 0759 12/08/22 2003 12/08/22  0653   WBC 10*3/mm3 7.26  --  7.07  --  6.14  --  6.27   HEMOGLOBIN g/dL 14.8 15.1 14.7 14.2 13.9  13.9   < > 12.1*   PLATELETS 10*3/mm3 236  --  199  --  201  --  186    < > = values in this interval not displayed.     Results from last 7 days   Lab Units 12/11/22  0746   INR  0.94       Assessment / Plan     ASSESSMENT:  1. Accelerated hypertension-renal artery duplex was normal, other secondary cause tests are pending again today; overally control is slowly better; he is nervous about taking more hydralazine, I talked him in to more  2. Abdominal pain-suspect functional, dr Gallo is seeing    PLAN:  1. Increase hydralazine gently again today  2.  "Continue other medicines  3. Await secondary cause eval results    Thank you for involving us in the care of Flako Coreas.  Please feel free to call with any questions.    Berhane Jimenez MD  22  09:19 [facility]    [facility]  [phone]       Electronically signed by Berhane Jimenez MD at 22 09     Sebastian Gonsalez MD at 22 0820              DAILY PROGRESS NOTE  [facility]    Patient Identification:  Name: Flako Coreas  Age: 57 y.o.  Sex: male  :  1965  MRN: [id number]         Primary Care Physician: Akash Rodriguez Jr., DO    Subjective:  Interval History: Tolerated bowel prep.  No melena reported any states stool is nearly clear.  Abdominal pain pretty much resolved and it seemed to revolve more around his Lepe's esophagus.  Denies any chest pain shortness of breath confusion nausea and or vomiting    Objective: No family at bedside.  Patient conversational and pleasant.  Alert and oriented x3    Scheduled Meds:amLODIPine, 10 mg, Oral, Daily  hydrALAZINE, 37.5 mg, Oral, Q8H  lisinopril, 20 mg, Oral, BID  pantoprazole, 40 mg, Oral, BID AC  rosuvastatin, 10 mg, Oral, Nightly  sucralfate, 1 g, Oral, 4x Daily AC & at Bedtime  vilazodone, 40 mg, Oral, Daily      Continuous Infusions:sodium chloride, 30 mL/hr, Last Rate: Stopped (12/10/22 1057)        Vital signs in last 24 hours:  Temp:  [97.7 °F (36.5 °C)-98.5 °F (36.9 °C)] 97.7 °F (36.5 °C)  Heart Rate:  [59-87] 66  Resp:  [15-16] 16  BP: (116-178)/() 158/103    Intake/Output:    Intake/Output Summary (Last 24 hours) at 2022 0820  Last data filed at 12/10/2022 1500  Gross per 24 hour   Intake 320 ml   Output --   Net 320 ml       Exam:  BP (!) 158/103 (BP Location: Right arm, Patient Position: Lying)   Pulse 66   Temp 97.7 °F (36.5 °C) (Oral)   Resp 16   Ht 188 cm (74\")   Wt 102 kg (224 lb 8 oz)   SpO2 92%   BMI 28.82 kg/m²     General Appearance:    " Alert, cooperative, no distress, AAOx3                        Throat:   Oral mucosa pink and moist                           Neck:   No JVD                         Lungs:    Clear to auscultation bilaterally, respirations unlabored                 Chest Wall:    No tenderness or deformity                          Heart:    Regular rate and rhythm, S1 and S2 normal                  Abdomen:     Soft, nontender, bowel sounds active, no rebound or guarding                 Extremities: Moving all, no cyanosis or edema                        Pulses:   Pulses palpable in lower extremities                               Data Review:  Labs in chart were reviewed.    Assessment:  Active Hospital Problems    Diagnosis  POA   • **Generalized abdominal pain [R10.84]  Yes   • History of esophagitis [Z87.19]  Not Applicable   • Gastrointestinal hemorrhage [K92.2]  Unknown   • Lepe's esophagus without dysplasia [K22.70]  Yes   • GERD (gastroesophageal reflux disease) [K21.9]  Yes   • Diverticulosis [K57.90]  Yes   • Mixed hyperlipidemia [E78.2]  Yes   • Essential hypertension [I10]  Yes   • Mixed anxiety depressive disorder [F41.8]  Yes      Resolved Hospital Problems   No resolved problems to display.       Plan:    Abdominal pain nearly resolved    Lepe's esophagus -PPI/Carafate    No signs of acute blood loss -Hgb stable and normal so we will reduce checks to daily    GI to perform colonoscopy today -appreciate the endoscopic assistance    Hypertension managed per nephrology with negative renal artery scan   -Discussed with renal MD and he is going to slowly titrate hydralazine   -Also on twice daily ACE, 10 mg of Norvasc   -Likely could use outpatient sleep study for refractory hypertension   -Previous use of NSAIDs not endorsed      Disposition -likely home tomorrow pending the above    Sebastian Gonsalez MD  12/11/2022  08:20 EST      Electronically signed by Sebastian Gonsalez MD at 12/11/22 0823     Edward  Ayo RAPHAEL MD at 12/10/22 1528              [facility]    ASSOCIATES     LOS: 2 days     Subjective:    CC:Abdominal Pain    DIET:  Diet Order   Procedures   • Diet: Liquid Diets; Clear Liquid; Texture: Regular Texture (IDDSI 7); Fluid Consistency: Thin (IDDSI 0)     No chest pain palpitations or shortness of breath reported.  The epigastric pain has improved.  He is now reporting left lower quadrant tenderness and it is cramping in quality.  Not reporting any nausea or vomiting currently.  No diarrhea reported.  No dysuria reported.    Objective:    Vital Signs:  Temp:  [97.7 °F (36.5 °C)-98.6 °F (37 °C)] 98.2 °F (36.8 °C)  Heart Rate:  [52-87] 65  Resp:  [15-18] 16  BP: (116-188)/() 167/99    SpO2:  [91 %-98 %] 93 %  on  Flow (L/min):  [10] 10;   Device (Oxygen Therapy): room air  Body mass index is 28.82 kg/m².    Physical Exam  Vitals and nursing note reviewed.   Constitutional:       General: He is not in acute distress.     Appearance: He is not diaphoretic.   HENT:      Head: Normocephalic and atraumatic.   Eyes:      General:         Right eye: No discharge.         Left eye: No discharge.      Conjunctiva/sclera: Conjunctivae normal.   Cardiovascular:      Rate and Rhythm: Normal rate and regular rhythm.      Heart sounds: No murmur heard.    No friction rub.   Pulmonary:      Effort: Pulmonary effort is normal.      Breath sounds: Normal breath sounds.   Abdominal:      General: Bowel sounds are normal. There is no distension.      Palpations: Abdomen is soft.      Tenderness: There is abdominal tenderness. There is no guarding or rebound.   Musculoskeletal:         General: No swelling or tenderness.      Cervical back: Neck supple. No tenderness.   Skin:     General: Skin is warm and dry.   Neurological:      Mental Status: He is alert.      Cranial Nerves: No cranial nerve deficit.   Psychiatric:         Mood and Affect: Mood normal.         Behavior: Behavior normal.         Results  Review:    Glucose   Date Value Ref Range Status   12/10/2022 93 65 - 99 mg/dL Final   12/09/2022 98 65 - 99 mg/dL Final   12/08/2022 110 (H) 65 - 99 mg/dL Final     Results from last 7 days   Lab Units 12/10/22  0805   WBC 10*3/mm3 7.07   HEMOGLOBIN g/dL 14.7   HEMATOCRIT % 44.1   PLATELETS 10*3/mm3 199     Results from last 7 days   Lab Units 12/10/22  0805 12/09/22  0759 12/08/22  0653   SODIUM mmol/L 139   < > 138   POTASSIUM mmol/L 3.6   < > 3.6   CHLORIDE mmol/L 103   < > 101   CO2 mmol/L 23.4   < > 26.0   BUN mg/dL 11   < > 12   CREATININE mg/dL 0.84   < > 0.84   CALCIUM mg/dL 8.8   < > 8.8   BILIRUBIN mg/dL  --   --  0.5   ALK PHOS U/L  --   --  74   ALT (SGPT) U/L  --   --  20   AST (SGOT) U/L  --   --  17   GLUCOSE mg/dL 93   < > 110*    < > = values in this interval not displayed.             Results from last 7 days   Lab Units 12/08/22  0653   TROPONIN T ng/mL <0.010     Cultures:  No results found for: BLOODCX, URINECX, WOUNDCX, MRSACX, RESPCX, STOOLCX    I have reviewed daily medications and changes in CPOE    Scheduled meds  amLODIPine, 10 mg, Oral, Daily  hydrALAZINE, 37.5 mg, Oral, Q8H  lisinopril, 20 mg, Oral, BID  polyethylene glycol, 0.5 bottle, Oral, Q12H  rosuvastatin, 10 mg, Oral, Nightly  sucralfate, 1 g, Oral, 4x Daily AC & at Bedtime  vilazodone, 40 mg, Oral, Daily        sodium chloride, 30 mL/hr, Last Rate: Stopped (12/10/22 1057)      PRN meds  •  acetaminophen **OR** acetaminophen **OR** acetaminophen  •  hydrALAZINE  •  HYDROmorphone  •  ondansetron  •  ondansetron  •  [COMPLETED] Insert Peripheral IV **AND** sodium chloride  •  [COMPLETED] Insert Peripheral IV **AND** sodium chloride  •  sodium chloride        Generalized abdominal pain    Mixed anxiety depressive disorder    Mixed hyperlipidemia    Essential hypertension    Diverticulosis    Lepe's esophagus without dysplasia    GERD (gastroesophageal reflux disease)    History of esophagitis    Gastrointestinal hemorrhage    I  reviewed imaging, agree with interpretation  I reviewed telemetry, sinus    Assessment/Plan:  Mr. Coreas is a 57 y.o. with a history of Lepe's esophagus that is not on PPI who presents with abdominal pain, black diarrhea intermittent, nausea and vomiting.     Lepe's esophagus/melena:  -EGD 12/10 showing the Lepe's.  Carafate on board.  Start Protonix p.o.  Repeat EGD 3 months.  -Colonoscopy planned tomorrow.  Monitor hemoglobin and transfuse if needed.  -GI following     HTN   - Secondary hypertension work-up is underway per nephrology.  Renal arteries were okay.  -Titration per nephrology.    HLD: Statin  Depression: Viibryd    DVT PPX: scd  Disposition: Home/few days      Ayo Leon MD  12/10/22  15:28 EST      Electronically signed by Ayo Leon MD at 12/10/22 1538     Berhane Jimenez MD at 12/10/22 1349              [facility] Progress Note      Patient Name: Flako Coreas  : 1965  MRN: [id number]  Primary Care Physician:  Akash Rodriguez Jr., DO  Date of admission: 2022    Subjective     Interval History:   Epigastric and substernal pain is gone, having some crampy llq pain    Review of Systems:   14 point review of systems is otherwise negative except for mentioned above on HPI    Objective     Vitals:   Temp:  [97.7 °F (36.5 °C)-98.6 °F (37 °C)] 98.2 °F (36.8 °C)  Heart Rate:  [52-87] 65  Resp:  [15-18] 16  BP: (116-188)/() 167/99  Flow (L/min):  [10] 10    Intake/Output Summary (Last 24 hours) at 12/10/2022 1349  Last data filed at 12/10/2022 0737  Gross per 24 hour   Intake --   Output 1175 ml   Net -1175 ml       Physical Exam:    General Appearance: alert, oriented x 3, no acute distress   Skin: warm and dry  HEENT: oral mucosa normal, nonicteric sclera  Neck: supple, no JVD  Lungs: CTA  Heart: RRR, normal S1 and S2  Abdomen: soft, nontender, nondistended  : no palpable bladder  Extremities: no edema, cyanosis or  clubbing  Neuro: normal speech and mental status     Scheduled Meds:     amLODIPine, 10 mg, Oral, Daily  hydrALAZINE, 50 mg, Oral, Q8H  lisinopril, 20 mg, Oral, BID  polyethylene glycol, 0.5 bottle, Oral, Q12H  rosuvastatin, 10 mg, Oral, Nightly  sucralfate, 1 g, Oral, 4x Daily AC & at Bedtime  vilazodone, 40 mg, Oral, Daily      IV Meds:   sodium chloride, 30 mL/hr, Last Rate: Stopped (12/10/22 1057)        Results Reviewed:   I have personally reviewed the results from the time of this admission to 12/10/2022 13:49 EST     Results from last 7 days   Lab Units 12/10/22  0805 12/09/22 0759 12/08/22  0653   SODIUM mmol/L 139 140 138   POTASSIUM mmol/L 3.6 3.7 3.6   CHLORIDE mmol/L 103 103 101   CO2 mmol/L 23.4 26.4 26.0   BUN mg/dL 11 12 12   CREATININE mg/dL 0.84 0.93 0.84   CALCIUM mg/dL 8.8 8.8 8.8   BILIRUBIN mg/dL  --   --  0.5   ALK PHOS U/L  --   --  74   ALT (SGPT) U/L  --   --  20   AST (SGOT) U/L  --   --  17   GLUCOSE mg/dL 93 98 110*     Estimated Creatinine Clearance: 126.6 mL/min (by C-G formula based on SCr of 0.84 mg/dL).  Results from last 7 days   Lab Units 12/10/22  0805   PHOSPHORUS mg/dL 3.2         Results from last 7 days   Lab Units 12/10/22  0805 12/09/22 2011 12/09/22 0759 12/08/22 2003 12/08/22  0653   WBC 10*3/mm3 7.07  --  6.14  --  6.27   HEMOGLOBIN g/dL 14.7 14.2 13.9  13.9 14.4 12.1*   PLATELETS 10*3/mm3 199  --  201  --  186           Assessment / Plan     ASSESSMENT:  1. Accelerated hypertension-renal artery duplex was normal, other secondary cause tests are pending; overally control is better; he is nervous about taking more hydralazine, I initially prescribed 50mg tid but he was worried that would make him pass out  2. Abdominal pain-suspect functional, dr Gallo is seeing    PLAN:  1. Increase hydralazine gently   2. Continue other medicines  3. Await secondary cause eval results    Thank you for involving us in the care of Flako Coreas.  Please feel free to call with any  questions.    Berhane Jimenez MD  12/10/22  13:49 EST    [facility]  [phone]       Electronically signed by Berhane Jimenez MD at 12/10/22 1355       Consult Notes (last 48 hours)  Notes from 12/10/22 1049 through 12/12/22 1049   No notes of this type exist for this encounter.       Discharge Order (From admission, onward)     Start     Ordered    12/12/22 1013  Discharge patient  Once        Expected Discharge Date: 12/12/22    Expected Discharge Time: Morning    Discharge Disposition: Home or Self Care    Physician of Record for Attribution - Please select from Treatment Team: ABHISHEK STOKES [[id number]]    Review needed by CMO to determine Physician of Record: No       Question Answer Comment   Physician of Record for Attribution - Please select from Treatment Team ABHISHEK STOKES    Review needed by CMO to determine Physician of Record No        12/12/22 1012

## 2022-12-12 NOTE — PLAN OF CARE
Goal Outcome Evaluation:  Plan of Care Reviewed With: patient        Progress: improving  Outcome Evaluation: VSS. Telemetry monitor, SR. Alert and oriented x4, room air. Up ad chinyere. K replaced. Will proceed with discharge.

## 2022-12-12 NOTE — CASE MANAGEMENT/SOCIAL WORK
Case Management Discharge Note      Final Note: home no needs         Selected Continued Care - Admitted Since 12/8/2022     Destination    No services have been selected for the patient.              Durable Medical Equipment    No services have been selected for the patient.              Dialysis/Infusion    No services have been selected for the patient.              Home Medical Care    No services have been selected for the patient.              Therapy    No services have been selected for the patient.              Community Resources    No services have been selected for the patient.              Community & DME    No services have been selected for the patient.                       Final Discharge Disposition Code: 01 - home or self-care

## 2022-12-13 ENCOUNTER — READMISSION MANAGEMENT (OUTPATIENT)
Dept: CALL CENTER | Facility: HOSPITAL | Age: 57
End: 2022-12-13

## 2022-12-13 LAB
LAB AP CASE REPORT: NORMAL
METANEPH FREE SERPL-MCNC: 31.9 PG/ML (ref 0–88)
NORMETANEPHRINE SERPL-MCNC: 97.5 PG/ML (ref 0–244)
PATH REPORT.FINAL DX SPEC: NORMAL
PATH REPORT.GROSS SPEC: NORMAL

## 2022-12-13 NOTE — OUTREACH NOTE
Prep Survey    Flowsheet Row Responses   Religion facility patient discharged from? [facility]   Is LACE score < 7 ? No   Emergency Room discharge w/ pulse ox? No   Eligibility Readm Mgmt   Discharge diagnosis Abdominal pin, possibly gastritis   Does the patient have one of the following disease processes/diagnoses(primary or secondary)? Other   Does the patient have Home health ordered? No   Is there a DME ordered? No   Prep survey completed? Yes          NATHANAEL PRITCHETT - Registered Nurse

## 2022-12-14 LAB
ALDOST SERPL-MCNC: 9.6 NG/DL (ref 0–30)
ALDOST/RENIN PLAS-RTO: 10.6 {RATIO} (ref 0–30)
RENIN PLAS-CCNC: 0.91 NG/ML/HR (ref 0.17–5.38)

## 2022-12-14 NOTE — PROGRESS NOTES
Enter Query Response Below      Query Response:       From a polyp       If applicable, please update the problem list.        Patient: Flako Coreas        : 1965  Account: [id number]           Admit Date: 2022        How to Respond to this query:       a. Click New Note     b. Answer query within the yellow box.                c. Update the Problem List, if applicable.      If you have any questions about this query contact me at: [email or web address]    Dr. Gallo,     Patient admitted with abdominal pain with possible GI bleed/black stools, HTN, alcoholism.  Patient underwent EGD/colonoscopy with findings of internal hemorrhoids, diverticulosis of sigmoid colon/descending colon, biopsy taken of ascending colon polyp.  Treated with protonix.     Please clarify the etiology of the GI bleeding:  -GI bleed due to internal hemorrhoids  -GI bleed due diverticulosis  -GI bleed due to (please specify)____________  -other_____________  -unable to determine    By submitting this query, we are merely seeking further clarification of documentation to accurately reflect all conditions that you are monitoring, evaluating, treating or that extend the hospitalization or utilize additional resources of care. Please utilize your independent clinical judgment when addressing the question(s) above.     This query and your response, once completed, will be entered into the legal medical record.    Sincerely,  Lo Corrigan RN BSN  Clinical Documentation Integrity Program

## 2022-12-15 NOTE — PROGRESS NOTES
Tubular adenoma colon polyp  I recommend colonoscopy recall in 5 years  Follow-up with Dr. Springer at [facility] for Lepe's surveillance next year

## 2022-12-16 ENCOUNTER — OFFICE VISIT (OUTPATIENT)
Dept: SPORTS MEDICINE | Facility: CLINIC | Age: 57
End: 2022-12-16

## 2022-12-16 VITALS
WEIGHT: 224 LBS | TEMPERATURE: 99 F | OXYGEN SATURATION: 98 % | HEIGHT: 74 IN | SYSTOLIC BLOOD PRESSURE: 158 MMHG | HEART RATE: 78 BPM | BODY MASS INDEX: 28.75 KG/M2 | DIASTOLIC BLOOD PRESSURE: 90 MMHG | RESPIRATION RATE: 16 BRPM

## 2022-12-16 DIAGNOSIS — F41.8 MIXED ANXIETY DEPRESSIVE DISORDER: ICD-10-CM

## 2022-12-16 DIAGNOSIS — K29.00 ACUTE GASTRITIS WITHOUT HEMORRHAGE, UNSPECIFIED GASTRITIS TYPE: Primary | ICD-10-CM

## 2022-12-16 DIAGNOSIS — I10 ESSENTIAL HYPERTENSION: ICD-10-CM

## 2022-12-16 DIAGNOSIS — K22.70 BARRETT'S ESOPHAGUS WITHOUT DYSPLASIA: ICD-10-CM

## 2022-12-16 PROCEDURE — 99215 OFFICE O/P EST HI 40 MIN: CPT | Performed by: FAMILY MEDICINE

## 2022-12-16 NOTE — PROGRESS NOTES
"Flako is a 57 y.o. year old male presents to [facility]    Chief Complaint   Patient presents with   • GI Problem     GI issues resulting in a 5 day hospital stay. Here today for follow up eval.        History of Present Illness  Admitted 12/8, discharged 12/12/22. Increased dose of Protonix to 40 mg BID. Has only been taking once daily. Overall, feels \"85%\" better. Has been eating bland diet - eggs, soup.  GI consulted while inpatient, performed EGD and colonoscopy.  I have reviewed those reports.  There was evidence of active Lepe's esophagitis with recommendation for follow-up EGD in 3 months as outpatient.  His Protonix as outpatient was 40 mg daily and this was increased to twice daily while inpatient.  Additionally, given his labile blood pressure nephrology was consulted.  I have reviewed those notes and scans.  His medications were adjusted as noted below.    Nephro increased lisinopril to 40 mg BID. F/up Jan '23 with them.  Additionally, was recommended to dose hydralazine 50 mg 3 times daily.    ED to Hosp-Admission (Discharged) with Ayo Leon MD; Juan Ramon Flowers MD (12/08/2022)    I have reviewed the patient's medical, family, and social history in detail and updated the computerized patient record.    /90 (BP Location: Right arm, Patient Position: Sitting, Cuff Size: Adult)   Pulse 78   Temp 99 °F (37.2 °C)   Resp 16   Ht 188 cm (74\")   Wt 102 kg (224 lb)   SpO2 98%   BMI 28.76 kg/m²      Physical Exam    Mask worn thru encounter  Vital signs reviewed.   General: No acute distress.  Eyes: conjunctiva clear; pupils equally round and reactive  ENT: external ears atraumatic  CV: no peripheral edema; S1, S2, no murmurs rubs or gallops  Resp: normal respiratory effort, no use of accessory muscles; CTA B/L  Skin: no rashes or wounds; normal turgor  Psych: mood and affect appropriate; recent and remote memory intact  Neuro: sensation to light touch " intact      Common labs    Common Labs 12/10/22 12/10/22 12/10/22 12/11/22 12/11/22 12/12/22 12/12/22    0805 0805 2016 0746 0746 0657 0650   Glucose  93   100 (A)  100 (A)   BUN  11   11  13   Creatinine  0.84   0.95  0.90   Sodium  139   138  140   Potassium  3.6   3.5  3.4 (A)   Chloride  103   104  105   Calcium  8.8   9.2  9.1   Albumin  4.00   4.40     Total Bilirubin     0.7     Alkaline Phosphatase     78     AST (SGOT)     22     ALT (SGPT)     25     WBC 7.07   7.26  8.30    Hemoglobin 14.7  15.1 14.8  15.4    Hematocrit 44.1  44.9 45.6  45.5    Platelets 199   236  237    (A) Abnormal value            CT Angiogram Chest (12/08/2022 09:04)  CT Abdomen Pelvis With Contrast (12/02/2022 14:14)  COLONOSCOPY (12/11/2022 09:36)  UPPER GI ENDOSCOPY (12/10/2022 10:23)           Diagnoses and all orders for this visit:    Acute gastritis without hemorrhage, unspecified gastritis type    Lepe's esophagus without dysplasia    Essential hypertension    Mixed anxiety depressive disorder    Overall Flako is doing much better following his hospitalization.  I did review medication changes, specifically his Protonix to dose twice daily.  I recommend to continue at this dosage until he has repeat EGD performed under guidance of GI.  Follow-up pending as well with nephrology.    I spent 45 minutes caring for Flako on this date of service. This time includes time spent by me in the following activities:preparing for the visit, reviewing tests, obtaining and/or reviewing a separately obtained history, performing a medically appropriate examination and/or evaluation , counseling and educating the patient/family/caregiver, documenting information in the medical record and independently interpreting results and communicating that information with the patient/family/caregiver  Follow Up   Return for Next scheduled follow up.  Patient was given instructions and counseling regarding his condition or for health maintenance  advice. Please see specific information pulled into the AVS if appropriate.     EMR Dragon/Transcription disclaimer:    Much of this encounter note is an electronic transcription/translation of spoken language to printed text.  The electronic translation of spoken language may permit erroneous, or at times, nonsensical words or phrases to be inadvertently transcribed.  Although I have reviewed the note for such errors some may still exist.

## 2022-12-19 ENCOUNTER — TELEPHONE (OUTPATIENT)
Dept: GASTROENTEROLOGY | Facility: CLINIC | Age: 57
End: 2022-12-19

## 2022-12-19 NOTE — TELEPHONE ENCOUNTER
----- Message from Albert Gallo MD sent at 12/15/2022 12:04 PM EST -----  Tubular adenoma colon polyp  I recommend colonoscopy recall in 5 years  Follow-up with Dr. Springer at [facility] for Lepe's surveillance next year

## 2022-12-19 NOTE — TELEPHONE ENCOUNTER
Per Dr. Gallo:     Sure, I can see him in February or March and we can do the EGD right after that

## 2022-12-19 NOTE — TELEPHONE ENCOUNTER
Patient called. Advised as per Dr. Gallo's note. He verb understanding.   Office visit scheduled for 02/02/23@1250.

## 2022-12-19 NOTE — TELEPHONE ENCOUNTER
Patient notified of results and recommendations and verbalized understanding    Pt stated he would prefer to stay with you for his EGD.  Thinks he just saw Dr Springer while in the hospital.  Did you want me to schedule an apt? Please advise    Hm and cs recall placed for 12/11/27

## 2022-12-21 ENCOUNTER — READMISSION MANAGEMENT (OUTPATIENT)
Dept: CALL CENTER | Facility: HOSPITAL | Age: 57
End: 2022-12-21

## 2022-12-21 NOTE — OUTREACH NOTE
Medical Week 2 Survey    Flowsheet Row Responses   [facility] [place] patient discharged from? [facility]   Does the patient have one of the following disease processes/diagnoses(primary or secondary)? Other   Week 2 attempt successful? No   Unsuccessful attempts Attempt 1          NOELLE PRITCHETT - Registered Nurse

## 2022-12-28 ENCOUNTER — READMISSION MANAGEMENT (OUTPATIENT)
Dept: CALL CENTER | Facility: HOSPITAL | Age: 57
End: 2022-12-28

## 2022-12-28 NOTE — OUTREACH NOTE
Medical Week 3 Survey    Flowsheet Row Responses   [facility] patient discharged from? [place]   Does the patient have one of the following disease processes/diagnoses(primary or secondary)? Other   Week 3 attempt successful? Yes   Call start time 1629   Call end time 1643   Discharge diagnosis Abdominal pin, possibly gastritis   Meds reviewed with patient/caregiver? Yes   Is the patient having any side effects they believe may be caused by any medication additions or changes? No   Does the patient have all medications ordered at discharge? Yes   Is the patient taking all medications as directed (includes completed medication regime)? Yes   Does the patient have a primary care provider?  Yes   Comments regarding PCP PATIENT HAS SEEN HIS PCP AND WENT TO [facility] TODAY FOR CONTINUED ISSUES WITH NAUSEA, VOMITING, AND LOOSE BLOODY STOOLS.    Has the patient kept scheduled appointments due by today? Yes   Has home health visited the patient within 72 hours of discharge? N/A   Psychosocial issues? Yes   Psychosocial comments PATIENT REPORTS EXTREME ANXIETY AND HE IS CONCERNED ABOUT LOSING HIS JOB. PATIENT OFFERED UP SEVERAL RESOURCES FOR [facility], AND ENCOURAGED TO STAY IN TOUCH WITH HIS PCP ABOUT THESE ISSUES FOR GUIDANCE.    Did the patient receive a copy of their discharge instructions? Yes   Nursing interventions Reviewed instructions with patient   What is the patient's perception of their health status since discharge? Same   Is the patient/caregiver able to teach back signs and symptoms related to disease process for when to call PCP? Yes   Is the patient/caregiver able to teach back signs and symptoms related to disease process for when to call 911? Yes   Is the patient/caregiver able to teach back the hierarchy of who to call/visit for symptoms/problems? PCP, Specialist, Home health nurse, Urgent Care, ED, 911 Yes   If the patient is a current smoker, are they able to teach back resources for  cessation? Not a smoker   Week 3 Call Completed? Yes          DAVID BARRIOS - Licensed Nurse

## 2023-04-14 ENCOUNTER — TELEPHONE (OUTPATIENT)
Dept: SPORTS MEDICINE | Facility: CLINIC | Age: 58
End: 2023-04-14
Payer: COMMERCIAL

## 2023-04-14 DIAGNOSIS — K29.00 ACUTE GASTRITIS WITHOUT HEMORRHAGE, UNSPECIFIED GASTRITIS TYPE: ICD-10-CM

## 2023-04-14 RX ORDER — ONDANSETRON HYDROCHLORIDE 8 MG/1
8 TABLET, FILM COATED ORAL EVERY 8 HOURS PRN
Qty: 30 TABLET | Refills: 0 | Status: SHIPPED | OUTPATIENT
Start: 2023-04-14

## 2023-04-14 NOTE — TELEPHONE ENCOUNTER
Patient called in requesting a anti nausea medication, seen at [facility] for his back  Yesterday 04/13/2023. Given Robaxin and Prednisone, states he is vomiting after taking the medication, once he is able to get it down the medications are helping, but he would like Zofran to help with the process and keeping the medication down.   [facility] pharmacy confirmed.       Thanks  Priti

## 2023-05-18 ENCOUNTER — TELEPHONE (OUTPATIENT)
Dept: SPORTS MEDICINE | Facility: CLINIC | Age: 58
End: 2023-05-18
Payer: COMMERCIAL

## 2023-05-18 RX ORDER — LISINOPRIL 40 MG/1
40 TABLET ORAL 2 TIMES DAILY
Qty: 180 TABLET | Refills: 0 | Status: SHIPPED | OUTPATIENT
Start: 2023-05-18

## 2023-05-18 NOTE — TELEPHONE ENCOUNTER
Called and spoke with patient, informed of reply from Dr. Rodriguez, also needed a refill until follow up with us in august. rx sent in, all information verbally understood.     Thanks  Priti

## 2023-05-18 NOTE — TELEPHONE ENCOUNTER
Patient called in wanting clarification on his BP medication. Currently has rx for 40mg 1 BID, but states his last rx with us was 20mg BID. He doesn't want to be taking too much, wants to clarify the correct dose for him to be taking.   CPE scheduled for 08/02/2023     Thank

## 2023-08-16 ENCOUNTER — APPOINTMENT (OUTPATIENT)
Dept: GENERAL RADIOLOGY | Facility: HOSPITAL | Age: 58
End: 2023-08-16
Payer: COMMERCIAL

## 2023-08-16 ENCOUNTER — APPOINTMENT (OUTPATIENT)
Dept: CT IMAGING | Facility: HOSPITAL | Age: 58
End: 2023-08-16
Payer: COMMERCIAL

## 2023-08-16 ENCOUNTER — APPOINTMENT (OUTPATIENT)
Dept: MRI IMAGING | Facility: HOSPITAL | Age: 58
End: 2023-08-16
Payer: COMMERCIAL

## 2023-08-16 ENCOUNTER — HOSPITAL ENCOUNTER (OUTPATIENT)
Facility: HOSPITAL | Age: 58
Setting detail: OBSERVATION
Discharge: HOME | End: 2023-08-19
Attending: EMERGENCY MEDICINE | Admitting: INTERNAL MEDICINE
Payer: COMMERCIAL

## 2023-08-16 DIAGNOSIS — R26.81 UNSTEADY GAIT: ICD-10-CM

## 2023-08-16 DIAGNOSIS — R42 DIZZINESS: Primary | ICD-10-CM

## 2023-08-16 DIAGNOSIS — F41.8 MIXED ANXIETY DEPRESSIVE DISORDER: Chronic | ICD-10-CM

## 2023-08-16 DIAGNOSIS — I10 POORLY-CONTROLLED HYPERTENSION: ICD-10-CM

## 2023-08-16 LAB
ALBUMIN SERPL-MCNC: 4.8 G/DL (ref 3.5–5.2)
ALBUMIN/GLOB SERPL: 1.8 G/DL
ALP SERPL-CCNC: 74 U/L (ref 39–117)
ALT SERPL W P-5'-P-CCNC: 23 U/L (ref 1–41)
ANION GAP SERPL CALCULATED.3IONS-SCNC: 15.1 MMOL/L (ref 5–15)
AST SERPL-CCNC: 13 U/L (ref 1–40)
BASOPHILS # BLD AUTO: 0.04 10*3/MM3 (ref 0–0.2)
BASOPHILS NFR BLD AUTO: 0.4 % (ref 0–1.5)
BILIRUB SERPL-MCNC: 0.2 MG/DL (ref 0–1.2)
BILIRUB UR QL STRIP: NEGATIVE
BUN SERPL-MCNC: 15 MG/DL (ref 6–20)
BUN/CREAT SERPL: 13.8 (ref 7–25)
CALCIUM SPEC-SCNC: 9.9 MG/DL (ref 8.6–10.5)
CHLORIDE SERPL-SCNC: 104 MMOL/L (ref 98–107)
CHOLEST SERPL-MCNC: 194 MG/DL (ref 0–200)
CLARITY UR: CLEAR
CO2 SERPL-SCNC: 19.9 MMOL/L (ref 22–29)
COLOR UR: YELLOW
CREAT SERPL-MCNC: 1.09 MG/DL (ref 0.76–1.27)
DEPRECATED RDW RBC AUTO: 43.9 FL (ref 37–54)
EGFRCR SERPLBLD CKD-EPI 2021: 79.7 ML/MIN/1.73
EOSINOPHIL # BLD AUTO: 0.53 10*3/MM3 (ref 0–0.4)
EOSINOPHIL NFR BLD AUTO: 5.9 % (ref 0.3–6.2)
ERYTHROCYTE [DISTWIDTH] IN BLOOD BY AUTOMATED COUNT: 15.7 % (ref 12.3–15.4)
GLOBULIN UR ELPH-MCNC: 2.7 GM/DL
GLUCOSE BLDC GLUCOMTR-MCNC: 113 MG/DL (ref 70–130)
GLUCOSE SERPL-MCNC: 124 MG/DL (ref 65–99)
GLUCOSE UR STRIP-MCNC: NEGATIVE MG/DL
HBA1C MFR BLD: 5.2 % (ref 4.8–5.6)
HCT VFR BLD AUTO: 46.6 % (ref 37.5–51)
HDLC SERPL-MCNC: 41 MG/DL (ref 40–60)
HGB BLD-MCNC: 15.2 G/DL (ref 13–17.7)
HGB UR QL STRIP.AUTO: NEGATIVE
HOLD SPECIMEN: NORMAL
HOLD SPECIMEN: NORMAL
IMM GRANULOCYTES # BLD AUTO: 0.03 10*3/MM3 (ref 0–0.05)
IMM GRANULOCYTES NFR BLD AUTO: 0.3 % (ref 0–0.5)
KETONES UR QL STRIP: NEGATIVE
LDLC SERPL CALC-MCNC: 119 MG/DL (ref 0–100)
LDLC/HDLC SERPL: 2.79 {RATIO}
LEUKOCYTE ESTERASE UR QL STRIP.AUTO: NEGATIVE
LYMPHOCYTES # BLD AUTO: 2.31 10*3/MM3 (ref 0.7–3.1)
LYMPHOCYTES NFR BLD AUTO: 25.6 % (ref 19.6–45.3)
MAGNESIUM SERPL-MCNC: 2.4 MG/DL (ref 1.6–2.6)
MCH RBC QN AUTO: 25.9 PG (ref 26.6–33)
MCHC RBC AUTO-ENTMCNC: 32.6 G/DL (ref 31.5–35.7)
MCV RBC AUTO: 79.3 FL (ref 79–97)
MONOCYTES # BLD AUTO: 0.78 10*3/MM3 (ref 0.1–0.9)
MONOCYTES NFR BLD AUTO: 8.6 % (ref 5–12)
NEUTROPHILS NFR BLD AUTO: 5.33 10*3/MM3 (ref 1.7–7)
NEUTROPHILS NFR BLD AUTO: 59.2 % (ref 42.7–76)
NITRITE UR QL STRIP: NEGATIVE
NRBC BLD AUTO-RTO: 0 /100 WBC (ref 0–0.2)
NT-PROBNP SERPL-MCNC: 65.5 PG/ML (ref 0–900)
PH UR STRIP.AUTO: 5.5 [PH] (ref 5–8)
PLATELET # BLD AUTO: 275 10*3/MM3 (ref 140–450)
PMV BLD AUTO: 9.8 FL (ref 6–12)
POTASSIUM SERPL-SCNC: 3.8 MMOL/L (ref 3.5–5.2)
PROT SERPL-MCNC: 7.5 G/DL (ref 6–8.5)
PROT UR QL STRIP: NEGATIVE
QT INTERVAL: 382 MS
RBC # BLD AUTO: 5.88 10*6/MM3 (ref 4.14–5.8)
SODIUM SERPL-SCNC: 139 MMOL/L (ref 136–145)
SP GR UR STRIP: 1.02 (ref 1–1.03)
TRIGL SERPL-MCNC: 194 MG/DL (ref 0–150)
TROPONIN T SERPL HS-MCNC: 6 NG/L
UROBILINOGEN UR QL STRIP: NORMAL
VLDLC SERPL-MCNC: 34 MG/DL (ref 5–40)
WBC NRBC COR # BLD: 9.02 10*3/MM3 (ref 3.4–10.8)
WHOLE BLOOD HOLD COAG: NORMAL
WHOLE BLOOD HOLD SPECIMEN: NORMAL

## 2023-08-16 PROCEDURE — 85025 COMPLETE CBC W/AUTO DIFF WBC: CPT

## 2023-08-16 PROCEDURE — 70498 CT ANGIOGRAPHY NECK: CPT

## 2023-08-16 PROCEDURE — G0378 HOSPITAL OBSERVATION PER HR: HCPCS

## 2023-08-16 PROCEDURE — 96374 THER/PROPH/DIAG INJ IV PUSH: CPT

## 2023-08-16 PROCEDURE — 99284 EMERGENCY DEPT VISIT MOD MDM: CPT

## 2023-08-16 PROCEDURE — 93005 ELECTROCARDIOGRAM TRACING: CPT

## 2023-08-16 PROCEDURE — 80053 COMPREHEN METABOLIC PANEL: CPT

## 2023-08-16 PROCEDURE — 70551 MRI BRAIN STEM W/O DYE: CPT

## 2023-08-16 PROCEDURE — 71045 X-RAY EXAM CHEST 1 VIEW: CPT

## 2023-08-16 PROCEDURE — 25510000001 IOPAMIDOL PER 1 ML: Performed by: EMERGENCY MEDICINE

## 2023-08-16 PROCEDURE — 84484 ASSAY OF TROPONIN QUANT: CPT

## 2023-08-16 PROCEDURE — 70496 CT ANGIOGRAPHY HEAD: CPT

## 2023-08-16 PROCEDURE — 25010000002 HYDRALAZINE PER 20 MG: Performed by: EMERGENCY MEDICINE

## 2023-08-16 PROCEDURE — 80061 LIPID PANEL: CPT | Performed by: PHYSICIAN ASSISTANT

## 2023-08-16 PROCEDURE — 83880 ASSAY OF NATRIURETIC PEPTIDE: CPT | Performed by: EMERGENCY MEDICINE

## 2023-08-16 PROCEDURE — 93010 ELECTROCARDIOGRAM REPORT: CPT | Performed by: INTERNAL MEDICINE

## 2023-08-16 PROCEDURE — 81003 URINALYSIS AUTO W/O SCOPE: CPT

## 2023-08-16 PROCEDURE — 83036 HEMOGLOBIN GLYCOSYLATED A1C: CPT | Performed by: PHYSICIAN ASSISTANT

## 2023-08-16 PROCEDURE — 82948 REAGENT STRIP/BLOOD GLUCOSE: CPT

## 2023-08-16 PROCEDURE — 83735 ASSAY OF MAGNESIUM: CPT

## 2023-08-16 RX ORDER — SODIUM CHLORIDE 0.9 % (FLUSH) 0.9 %
10 SYRINGE (ML) INJECTION AS NEEDED
Status: DISCONTINUED | OUTPATIENT
Start: 2023-08-16 | End: 2023-08-19 | Stop reason: HOSPADM

## 2023-08-16 RX ORDER — HYDRALAZINE HYDROCHLORIDE 20 MG/ML
10 INJECTION INTRAMUSCULAR; INTRAVENOUS ONCE
Status: COMPLETED | OUTPATIENT
Start: 2023-08-16 | End: 2023-08-16

## 2023-08-16 RX ORDER — LISINOPRIL 20 MG/1
40 TABLET ORAL 2 TIMES DAILY
Status: DISCONTINUED | OUTPATIENT
Start: 2023-08-16 | End: 2023-08-17

## 2023-08-16 RX ORDER — HYDRALAZINE HYDROCHLORIDE 50 MG/1
50 TABLET, FILM COATED ORAL EVERY 8 HOURS SCHEDULED
Status: DISCONTINUED | OUTPATIENT
Start: 2023-08-16 | End: 2023-08-17

## 2023-08-16 RX ORDER — CHOLECALCIFEROL (VITAMIN D3) 125 MCG
5 CAPSULE ORAL NIGHTLY PRN
Status: DISCONTINUED | OUTPATIENT
Start: 2023-08-16 | End: 2023-08-19 | Stop reason: HOSPADM

## 2023-08-16 RX ORDER — HYDRALAZINE HYDROCHLORIDE 50 MG/1
50 TABLET, FILM COATED ORAL EVERY 8 HOURS SCHEDULED
Status: DISCONTINUED | OUTPATIENT
Start: 2023-08-16 | End: 2023-08-16

## 2023-08-16 RX ORDER — ONDANSETRON 4 MG/1
4 TABLET, FILM COATED ORAL EVERY 6 HOURS PRN
Status: DISCONTINUED | OUTPATIENT
Start: 2023-08-16 | End: 2023-08-19 | Stop reason: HOSPADM

## 2023-08-16 RX ORDER — SODIUM CHLORIDE 9 MG/ML
40 INJECTION, SOLUTION INTRAVENOUS AS NEEDED
Status: DISCONTINUED | OUTPATIENT
Start: 2023-08-16 | End: 2023-08-19 | Stop reason: HOSPADM

## 2023-08-16 RX ORDER — MECLIZINE HYDROCHLORIDE 25 MG/1
25 TABLET ORAL ONCE
Status: COMPLETED | OUTPATIENT
Start: 2023-08-16 | End: 2023-08-16

## 2023-08-16 RX ORDER — AMITRIPTYLINE HYDROCHLORIDE 25 MG/1
25 TABLET, FILM COATED ORAL NIGHTLY PRN
Status: DISCONTINUED | OUTPATIENT
Start: 2023-08-16 | End: 2023-08-19 | Stop reason: HOSPADM

## 2023-08-16 RX ORDER — ATORVASTATIN CALCIUM 20 MG/1
40 TABLET, FILM COATED ORAL NIGHTLY
Status: DISCONTINUED | OUTPATIENT
Start: 2023-08-16 | End: 2023-08-17

## 2023-08-16 RX ORDER — ALPRAZOLAM 0.25 MG/1
0.25 TABLET ORAL ONCE
Status: COMPLETED | OUTPATIENT
Start: 2023-08-16 | End: 2023-08-16

## 2023-08-16 RX ORDER — ASPIRIN 325 MG
325 TABLET, DELAYED RELEASE (ENTERIC COATED) ORAL ONCE
Status: COMPLETED | OUTPATIENT
Start: 2023-08-16 | End: 2023-08-16

## 2023-08-16 RX ORDER — VILAZODONE HYDROCHLORIDE 40 MG/1
40 TABLET ORAL DAILY
Status: DISCONTINUED | OUTPATIENT
Start: 2023-08-17 | End: 2023-08-19 | Stop reason: HOSPADM

## 2023-08-16 RX ORDER — NITROGLYCERIN 0.4 MG/1
0.4 TABLET SUBLINGUAL
Status: DISCONTINUED | OUTPATIENT
Start: 2023-08-16 | End: 2023-08-19 | Stop reason: HOSPADM

## 2023-08-16 RX ORDER — AMLODIPINE BESYLATE 10 MG/1
10 TABLET ORAL DAILY
Status: DISCONTINUED | OUTPATIENT
Start: 2023-08-16 | End: 2023-08-17

## 2023-08-16 RX ORDER — SODIUM CHLORIDE 0.9 % (FLUSH) 0.9 %
10 SYRINGE (ML) INJECTION EVERY 12 HOURS SCHEDULED
Status: DISCONTINUED | OUTPATIENT
Start: 2023-08-16 | End: 2023-08-19 | Stop reason: HOSPADM

## 2023-08-16 RX ORDER — ACETAMINOPHEN 325 MG/1
650 TABLET ORAL EVERY 4 HOURS PRN
Status: DISCONTINUED | OUTPATIENT
Start: 2023-08-16 | End: 2023-08-19 | Stop reason: HOSPADM

## 2023-08-16 RX ORDER — ONDANSETRON 2 MG/ML
4 INJECTION INTRAMUSCULAR; INTRAVENOUS EVERY 6 HOURS PRN
Status: DISCONTINUED | OUTPATIENT
Start: 2023-08-16 | End: 2023-08-19 | Stop reason: HOSPADM

## 2023-08-16 RX ADMIN — HYDRALAZINE HYDROCHLORIDE 10 MG: 20 INJECTION, SOLUTION INTRAMUSCULAR; INTRAVENOUS at 15:40

## 2023-08-16 RX ADMIN — Medication 10 ML: at 20:31

## 2023-08-16 RX ADMIN — ASPIRIN 325 MG: 325 TABLET, COATED ORAL at 22:53

## 2023-08-16 RX ADMIN — AMLODIPINE BESYLATE 10 MG: 10 TABLET ORAL at 17:47

## 2023-08-16 RX ADMIN — ATORVASTATIN CALCIUM 40 MG: 20 TABLET, FILM COATED ORAL at 20:31

## 2023-08-16 RX ADMIN — LISINOPRIL 40 MG: 20 TABLET ORAL at 20:31

## 2023-08-16 RX ADMIN — IOPAMIDOL 95 ML: 755 INJECTION, SOLUTION INTRAVENOUS at 18:20

## 2023-08-16 RX ADMIN — ALPRAZOLAM 0.25 MG: 0.25 TABLET ORAL at 21:49

## 2023-08-16 RX ADMIN — MECLIZINE HYDROCHLORIDE 25 MG: 25 TABLET ORAL at 16:31

## 2023-08-16 RX ADMIN — HYDRALAZINE HYDROCHLORIDE 50 MG: 50 TABLET, FILM COATED ORAL at 18:57

## 2023-08-16 NOTE — ED NOTES
Nursing report ED to floor  Flako Coreas  58 y.o.  male    HPI :   Chief Complaint   Patient presents with    Dizziness       Admitting doctor:   Rik Lindo II, MD    Admitting diagnosis:   The primary encounter diagnosis was Dizziness. Diagnoses of Unsteady gait and Poorly-controlled hypertension were also pertinent to this visit.    Code status:   Current Code Status       Date Active Code Status Order ID Comments User Context       Prior            Allergies:   Patient has no known allergies.    Isolation:   No active isolations    Intake and Output  No intake or output data in the 24 hours ending 08/16/23 1627    Weight:       08/16/23  1455   Weight: 102 kg (225 lb)       Most recent vitals:   Vitals:    08/16/23 1531 08/16/23 1533 08/16/23 1546 08/16/23 1600   BP: (!) 166/110 (!) 166/115 168/94 160/93   Patient Position: Sitting Standing     Pulse: 79 90 80 80   Resp:       Temp:       TempSrc:       SpO2:   94% 95%   Weight:       Height:           Active LDAs/IV Access:   Lines, Drains & Airways       Active LDAs       Name Placement date Placement time Site Days    Peripheral IV 08/16/23 1501 Right Antecubital 08/16/23  1501  Antecubital  less than 1                    Labs (abnormal labs have a star):   Labs Reviewed   COMPREHENSIVE METABOLIC PANEL - Abnormal; Notable for the following components:       Result Value    Glucose 124 (*)     CO2 19.9 (*)     Anion Gap 15.1 (*)     All other components within normal limits    Narrative:     GFR Normal >60  Chronic Kidney Disease <60  Kidney Failure <15     CBC WITH AUTO DIFFERENTIAL - Abnormal; Notable for the following components:    RBC 5.88 (*)     MCH 25.9 (*)     RDW 15.7 (*)     Eosinophils, Absolute 0.53 (*)     All other components within normal limits   SINGLE HSTROPONIN T - Normal    Narrative:     High Sensitive Troponin T Reference Range:  <10.0 ng/L- Negative Female for AMI  <15.0 ng/L- Negative Male for AMI  >=10 - Abnormal Female  indicating possible myocardial injury.  >=15 - Abnormal Male indicating possible myocardial injury.   Clinicians would have to utilize clinical acumen, EKG, Troponin, and serial changes to determine if it is an Acute Myocardial Infarction or myocardial injury due to an underlying chronic condition.        MAGNESIUM - Normal   URINALYSIS W/ MICROSCOPIC IF INDICATED (NO CULTURE) - Normal    Narrative:     Urine microscopic not indicated.   BNP (IN-HOUSE) - Normal    Narrative:     Among patients with dyspnea, NT-proBNP is highly sensitive for the detection of acute congestive heart failure. In addition NT-proBNP of <300 pg/ml effectively rules out acute congestive heart failure with 99% negative predictive value.     POCT GLUCOSE FINGERSTICK - Normal   RAINBOW DRAW    Narrative:     The following orders were created for panel order [place] Draw.  Procedure                               Abnormality         Status                     ---------                               -----------         ------                     Green Top (Gel)[[id number]]                                  Final result               Lavender Top[[id number]]                                     Final result               Gold Top - SST[[id number]]                                   Final result               Light Blue Top[[id number]]                                   Final result                 Please view results for these tests on the individual orders.   POCT GLUCOSE FINGERSTICK   CBC AND DIFFERENTIAL    Narrative:     The following orders were created for panel order CBC & Differential.  Procedure                               Abnormality         Status                     ---------                               -----------         ------                     CBC Auto Differential[[id number]]        Abnormal            Final result                 Please view results for these tests on the individual orders.   GREEN TOP   LAVENDER TOP   GOLD TOP -  [facility]   LIGHT BLUE TOP       EKG:   ECG 12 Lead ED Triage Standing Order; Weak / Dizzy / AMS   Preliminary Result   HEART RATE= 90  bpm   RR Interval= 667  ms   IN Interval= 153  ms   P Horizontal Axis= -68  deg   P Front Axis= 39  deg   QRSD Interval= 95  ms   QT Interval= 382  ms   QRS Axis= 37  deg   T Wave Axis= -21  deg   - BORDERLINE ECG -   Sinus rhythm   Borderline T abnormalities, inferior leads   Electronically Signed By:    Date and Time of Study: 2023-08-16 14:55:59          Meds given in ED:   Medications   sodium chloride 0.9 % flush 10 mL (has no administration in time range)   sodium chloride 0.9 % flush 10 mL (has no administration in time range)   meclizine (ANTIVERT) tablet 25 mg (has no administration in time range)   hydrALAZINE (APRESOLINE) injection 10 mg (10 mg Intravenous Given 8/16/23 1540)       Imaging results:  No radiology results for the last day    Ambulatory status:   - stand by assist    Social issues:   Social History     Socioeconomic History    Marital status: Single   Tobacco Use    Smoking status: Never    Smokeless tobacco: Never   Vaping Use    Vaping Use: Never used   Substance and Sexual Activity    Alcohol use: Yes     Alcohol/week: 20.0 standard drinks     Types: 10 Cans of beer, 10 Shots of liquor per week     Comment: pt states very rarely    Drug use: Yes     Frequency: 1.0 times per week     Types: Marijuana     Comment: Pt states he may have smoked marijuana 3 weeks ago (stated on 11-07-18)    Sexual activity: Not Currently     Partners: Female       NIH Stroke Scale:  Interval: baseline    Aylin Oneil RN  08/16/23 16:27 EDT

## 2023-08-16 NOTE — NURSING NOTE
"BP elevated. NP aware. See mar. Pt denies chest pain.  Pt states he is \"maxed out on blood pressure medicine at home. When asked who the patient sees outpatient he responded with \"no one\". Cardiology consulted. CTA head and neck pending. Mri pending.  "

## 2023-08-16 NOTE — PLAN OF CARE
Goal Outcome Evaluation:      Pt admitted for dizziness x 2 days. Pt has been able to stand independently but stand by assist with walking. BP elevated. See mar. Neuro and PT consulted for vestibular. MRI pending.  Pt is alert and oriented x4

## 2023-08-16 NOTE — H&P
"[facility] [place]   HISTORY AND PHYSICAL    Patient Name: Flako Coreas  : 1965  MRN: [id number]  Primary Care Physician:  Akash Rodriguez Jr.,   Date of admission: 2023    Subjective   Subjective     Chief Complaint:   Chief Complaint   Patient presents with    Dizziness         HPI:    Flako Coreas is a 58 y.o. male with history of hypertension, hyperlipidemia, GERD, and anxiety and depression, who presented to the emergency department today complaining of dizziness and gait disturbance.  Patient states he woke up  morning at approximately 6 AM and use the restroom and felt great.  He states he woke up later at 10 AM and was sweating and was nauseous.  Patient states when he got up to walk to the bathroom, he felt dizzy and was falling to the right.  Patient states he spent most of  dry heaving.   Patient states he thought he had food poisoning because he had eggs [place] on Saturday.  His symptoms persisted throughout the day on Monday and Tuesday.  Symptoms still have not improved today so patient presented to emergency department.  Patient states he still feels like he is falling to the right with ambulation.  He denies room spinning dizziness, states his symptoms are more consistent with lightheadedness.  Patient states he stopped taking his vilazodone 2 to 3 weeks ago and has subsequently had increased blood pressure at home.  He states he has had high blood pressure since he was 25 and is \"maxed out\" on many medications.  Blood pressure managed by his primary care provider.  Patient denies slurred speech, facial droop, unilateral weakness, visual disturbance, chest pain, shortness of breath, palpitations, abdominal pain, diarrhea.    ED evaluation reviewed, laboratory evaluation largely within normal limits except CO2 19.9.  Patient was hypertensive at time of arrival, 220/141.  He received 10 mg IV hydralazine in ED and blood pressure improved to 160s over 90s.  " Patient being admitted to observation unit for MRI of brain and neurology consultation.    Review of Systems   All systems were reviewed and negative except for: What is discussed in the HPI    Personal History     Past Medical History:   Diagnosis Date    ADHD (attention deficit hyperactivity disorder)     On going issue    Anxiety     Lepe's esophagus     Benign prostatic hyperplasia Surgery in 12/2021    Clotting disorder Intestinal    Depression     Diverticulitis     Diverticulosis     Duodenitis     Enteritis     Failure to thrive (child)     Family history of blood clots     GERD (gastroesophageal reflux disease)     Hyperlipidemia     Hypertension     Hypertensive emergency     Low testosterone     Microcytosis     Pancreatitis     Pneumonia     PONV (postoperative nausea and vomiting)     Seasonal affective disorder     Shoulder injury     Unexplained weight loss        Past Surgical History:   Procedure Laterality Date    COLON SURGERY      COLONOSCOPY      COLONOSCOPY N/A 12/11/2022    Procedure: COLONOSCOPY INTO CECUM WITH HOT SNARE POLYPECTOMY;  Surgeon: Albert Gallo MD;  Location: [facility] ENDOSCOPY;  Service: Gastroenterology;  Laterality: N/A;  PRE- GI BLEED  POST- DIVERTICULOSIS, POLYP    ENDOSCOPY N/A 12/10/2022    Procedure: ESOPHAGOGASTRODUODENOSCOPY;  Surgeon: Albert Gallo MD;  Location: [facility] ENDOSCOPY;  Service: Gastroenterology;  Laterality: N/A;  PRE- DARK STOOLS  POST- BARRETTS ESOPHAGUS    FRACTURE SURGERY  1980    for broken arm    FRACTURE SURGERY      for broken hand    INCISION AND DRAINAGE ABSCESS  2015    anal    PROSTATE SURGERY      SMALL INTESTINE SURGERY      TONSILLECTOMY         Family History: family history includes Stroke in his father and mother. Otherwise pertinent FHx was reviewed and not pertinent to current issue.    Social History:  reports that he has never smoked. He has never used smokeless tobacco. He reports current  alcohol use of about 20.0 standard drinks per week. He reports current drug use. Frequency: 1.00 time per week. Drug: Marijuana.    Home Medications:  amLODIPine, amitriptyline, hydrALAZINE, lisinopril, ondansetron, pantoprazole, rosuvastatin, sucralfate, and vilazodone    Allergies:  No Known Allergies    Objective   Objective     Vitals:   Temp:  [97.2 øF (36.2 øC)-97.5 øF (36.4 øC)] 97.5 øF (36.4 øC)  Heart Rate:  [] 71  Resp:  [18-20] 18  BP: (157-220)/() 172/100  Physical Exam     GENERAL: 58 y.o. YO male a/o x 4, NAD  SKIN: Warm pink and dry   HEENT:  PERRL, EOM intact, conjunctivae normal, sclerae clear, nystagmus with left gaze  NECK: supple, no JVD  LUNGS: Clear to auscultation bilaterally without wheezes, rales or rhonchi.  No accessory muscle use and no nasal flaring.  CARDIAC:  Regular rate and rhythm, S1-S2.  No murmurs, rubs or gallops.  No peripheral edema.  Equal pulses bilaterally.  ABDOMEN: Soft, nontender, nondistended.  No guarding or rebound tenderness.  Normal bowel sounds.  MUSCULOSKELETAL: Moves all extremities well.  No deformity.  NEURO: Cranial nerves II through XII grossly intact.  No gross focal deficits.  Alert.  Normal speech and motor.  Gait is somewhat ataxic.  PSYCH: Normal mood and affect      Result Review    Result Review:  I have personally reviewed the results from the time of this admission to 8/16/2023 17:07 EDT and agree with these findings:  [x]  Laboratory list / accordion  []  Microbiology  [x]  Radiology  [x]  EKG/Telemetry   []  Cardiology/Vascular   []  Pathology  []  Old records  []  Other:  Most notable findings include: CO2 19.9      Assessment & Plan   Assessment / Plan     Brief Patient Summary:  Flako Coreas is a 58 y.o. male who is being admitted observation unit for further evaluation of dizziness    Active Hospital Problems:  Active Hospital Problems    Diagnosis     **Dizziness      Plan:     Dizziness  Ataxia  -Consult neurology  -CTA head  and neck, MRI brain  -Aspirin/statin  -Lipid/hemoglobin A1c  -Meclizine as needed  -PT to evaluate and treat    Hypertension  -Poorly controlled  -Resume home medications as prescribed  -Consider cardiology consultation if blood pressure remains elevated     DVT prophylaxis:  Mechanical DVT prophylaxis orders are present.    CODE STATUS:    Level Of Support Discussed With: Patient  Code Status (Patient has no pulse and is not breathing): CPR (Attempt to Resuscitate)  Medical Interventions (Patient has pulse or is breathing): Full Support    Admission Status:  I believe this patient meets observation status.     75 minutes has been spent by [facility] providers in the care of this patient while under observation status    I wore an appropriate PPE during this patient encounter.  Hand hygeine performed before and after seeing the patient.    Electronically signed by ZHOU Martin, 08/16/23, 5:07 PM EDT.

## 2023-08-16 NOTE — ED NOTES
"Patient reports he \"can't walk a straight line\" since Sunday. He also reports being diaphoretic since Sunday. He did have N/V on Sunday but that has resolved. He also reported \"I can't think right\".   "

## 2023-08-16 NOTE — ED PROVIDER NOTES
" EMERGENCY DEPARTMENT ENCOUNTER    Room Number:  16/16  PCP: Akash Rodriguez Jr., DO  Historian: Patient      HPI:  Chief Complaint: Dizziness, unsteady gait  A complete HPI/ROS/PMH/PSH/SH/FH are unobtainable due to: Nothing  Context: Flako Coreas is a 58 y.o. male, with a history of hypertension, who presents to the ED by private vehicle from home c/o dizziness and unsteady gait for the past 3 days.  Patient woke up around 10 AM 3 days ago feeling sweaty and nauseated.  He got up to walk to the bathroom and felt very unsteady.  He states he was \"bumping into the walls\".  He then had a few episodes of dry heaves.  He thought that he his symptoms were possibly due to food poisoning from some holidays sauce had eaten the night before.  Since then, he continues to have dizziness and difficulty walking whenever he stands up.  He feels like he is falling to the right.  His symptoms resolve when he is sitting down or lying still.  He also feels like he is having trouble concentrating and \"focusing\".  Also reports a mild intermittent frontal headache but does not have a headache currently.  Also complains of a productive cough with clear yellow sputum and mild shortness of breath.  Denies chest pain, fever, vision changes, palpitations, syncope, abdominal pain, diarrhea, slurred speech, or numbness/tingling/weakness in his extremities.  Patient has a history of hypertension and takes amlodipine, hydralazine, and lisinopril.  He reports that his blood pressure is always somewhat high.  Blood pressure was 220/141 at triage.  He is not on anticoagulants.            PAST MEDICAL HISTORY  Active Ambulatory Problems     Diagnosis Date Noted    Mixed anxiety depressive disorder 12/11/2012    Mixed hyperlipidemia 06/26/2017    Essential hypertension 06/26/2017    Diverticulosis 07/27/2018    Nodule of spleen 06/27/2018    Chronic bilateral lower abdominal pain 08/10/2018    Lepe's esophagus without dysplasia " 10/10/2018    GERD (gastroesophageal reflux disease) 09/18/2018    History of esophagitis 12/08/2022     Resolved Ambulatory Problems     Diagnosis Date Noted    Abdominal pain 06/20/2018    Generalized abdominal pain 12/08/2022    Gastrointestinal hemorrhage 12/08/2022     Past Medical History:   Diagnosis Date    ADHD (attention deficit hyperactivity disorder)     Anxiety     Lepe's esophagus     Benign prostatic hyperplasia Surgery in 12/2021    Clotting disorder Intestinal    Depression     Diverticulitis     Duodenitis     Enteritis     Failure to thrive (child)     Family history of blood clots     Hyperlipidemia     Hypertension     Hypertensive emergency     Low testosterone     Microcytosis     Pancreatitis     Pneumonia     PONV (postoperative nausea and vomiting)     Seasonal affective disorder     Shoulder injury     Unexplained weight loss          PAST SURGICAL HISTORY  Past Surgical History:   Procedure Laterality Date    COLON SURGERY      COLONOSCOPY      COLONOSCOPY N/A 12/11/2022    Procedure: COLONOSCOPY INTO CECUM WITH HOT SNARE POLYPECTOMY;  Surgeon: Albert Gallo MD;  Location: [facility] ENDOSCOPY;  Service: Gastroenterology;  Laterality: N/A;  PRE- GI BLEED  POST- DIVERTICULOSIS, POLYP    ENDOSCOPY N/A 12/10/2022    Procedure: ESOPHAGOGASTRODUODENOSCOPY;  Surgeon: Albert Gallo MD;  Location: [facility] ENDOSCOPY;  Service: Gastroenterology;  Laterality: N/A;  PRE- DARK STOOLS  POST- BARRETTS ESOPHAGUS    FRACTURE SURGERY  1980    for broken arm    FRACTURE SURGERY      for broken hand    INCISION AND DRAINAGE ABSCESS  2015    anal    PROSTATE SURGERY      SMALL INTESTINE SURGERY      TONSILLECTOMY           FAMILY HISTORY  Family History   Problem Relation Age of Onset    Stroke Mother     Stroke Father          SOCIAL HISTORY  Social History     Socioeconomic History    Marital status: Single   Tobacco Use    Smoking status: Never    Smokeless tobacco: Never   Vaping Use    Vaping Use:  Never used   Substance and Sexual Activity    Alcohol use: Yes     Alcohol/week: 20.0 standard drinks     Types: 10 Cans of beer, 10 Shots of liquor per week     Comment: pt states very rarely    Drug use: Yes     Frequency: 1.0 times per week     Types: Marijuana     Comment: Pt states he may have smoked marijuana 3 weeks ago (stated on 11-07-18)    Sexual activity: Not Currently     Partners: Female         ALLERGIES  Patient has no known allergies.    REVIEW OF SYSTEMS  All systems have been reviewed and are negative except for those listed in the HPI      PHYSICAL EXAM  ED Triage Vitals   Temp Heart Rate Resp BP SpO2   08/16/23 1457 08/16/23 1457 08/16/23 1455 08/16/23 1455 08/16/23 1457   97.2 øF (36.2 øC) 100 20 (!) 220/141 97 %      Temp src Heart Rate Source Patient Position BP Location FiO2 (%)   08/16/23 1457 08/16/23 1457 -- -- --   Tympanic Monitor          Physical Exam      GENERAL: Awake, alert, oriented x3.  Well-developed, well-nourished male.  Resting comfortably in no acute distress  HENT: NCAT, nares patent, TMs are normal bilaterally  EYES: PERRL, EOMI, there is horizontal nystagmus on left lateral gaze  CV: regular rhythm, normal rate  RESPIRATORY: normal effort, clear to auscultation bilaterally  ABDOMEN: soft, nontender  MUSCULOSKELETAL: Extremities are nontender with full range of motion.  No calf tenderness.  No pedal edema  NEURO: Speech is clear and fluent.  No expressive or receptive aphasia.  No facial droop.  Tongue appears midline.  Normal strength and light touch sensation in all extremities.  Normal extinction testing.  Normal finger-to-nose and heel-to-shin testing bilaterally.  Visual fields are normal upon confrontation.  Gait is somewhat ataxic.  NIHSS is 0  PSYCH:  calm, cooperative  SKIN: warm, dry    Interval: baseline  1a. Level of Consciousness: 0-->Alert, keenly responsive  1b. LOC Questions: 0-->Answers both questions correctly  1c. LOC Commands: 0-->Performs both tasks  correctly  2. Best Gaze: 0-->Normal  3. Visual: 0-->No visual loss  4. Facial Palsy: 0-->Normal symmetrical movements  5a. Motor Arm, Left: 0-->No drift, limb holds 90 (or 45) degrees for full 10 secs  5b. Motor Arm, Right: 0-->No drift, limb holds 90 (or 45) degrees for full 10 secs  6a. Motor Leg, Left: 0-->No drift, leg holds 30 degree position for full 5 secs  6b. Motor Leg, Right: 0-->No drift, leg holds 30 degree position for full 5 secs  7. Limb Ataxia: 0-->Absent  8. Sensory: 0-->Normal, no sensory loss  9. Best Language: 0-->No aphasia, normal  10. Dysarthria: 0-->Normal  11. Extinction and Inattention (formerly Neglect): 0-->No abnormality    Total (NIH Stroke Scale): 0      Vital signs and nursing notes reviewed.          LAB RESULTS  Recent Results (from the past 24 hour(s))   ECG 12 Lead ED Triage Standing Order; Weak / Dizzy / AMS    Collection Time: 08/16/23  2:55 PM   Result Value Ref Range    QT Interval 382 ms   Comprehensive Metabolic Panel    Collection Time: 08/16/23  2:58 PM    Specimen: Blood   Result Value Ref Range    Glucose 124 (H) 65 - 99 mg/dL    BUN 15 6 - 20 mg/dL    Creatinine 1.09 0.76 - 1.27 mg/dL    Sodium 139 136 - 145 mmol/L    Potassium 3.8 3.5 - 5.2 mmol/L    Chloride 104 98 - 107 mmol/L    CO2 19.9 (L) 22.0 - 29.0 mmol/L    Calcium 9.9 8.6 - 10.5 mg/dL    Total Protein 7.5 6.0 - 8.5 g/dL    Albumin 4.8 3.5 - 5.2 g/dL    ALT (SGPT) 23 1 - 41 U/L    AST (SGOT) 13 1 - 40 U/L    Alkaline Phosphatase 74 39 - 117 U/L    Total Bilirubin 0.2 0.0 - 1.2 mg/dL    Globulin 2.7 gm/dL    A/G Ratio 1.8 g/dL    BUN/Creatinine Ratio 13.8 7.0 - 25.0    Anion Gap 15.1 (H) 5.0 - 15.0 mmol/L    eGFR 79.7 >60.0 mL/min/1.73   Single High Sensitivity Troponin T    Collection Time: 08/16/23  2:58 PM    Specimen: Blood   Result Value Ref Range    HS Troponin T 6 <15 ng/L   Magnesium    Collection Time: 08/16/23  2:58 PM    Specimen: Blood   Result Value Ref Range    Magnesium 2.4 1.6 - 2.6 mg/dL    Green Top (Gel)    Collection Time: 08/16/23  2:58 PM   Result Value Ref Range    Extra Tube Hold for add-ons.    Lavender Top    Collection Time: 08/16/23  2:58 PM   Result Value Ref Range    Extra Tube hold for add-on    Gold Top - SST    Collection Time: 08/16/23  2:58 PM   Result Value Ref Range    Extra Tube Hold for add-ons.    Light Blue Top    Collection Time: 08/16/23  2:58 PM   Result Value Ref Range    Extra Tube Hold for add-ons.    CBC Auto Differential    Collection Time: 08/16/23  2:58 PM    Specimen: Blood   Result Value Ref Range    WBC 9.02 3.40 - 10.80 10*3/mm3    RBC 5.88 (H) 4.14 - 5.80 10*6/mm3    Hemoglobin 15.2 13.0 - 17.7 g/dL    Hematocrit 46.6 37.5 - 51.0 %    MCV 79.3 79.0 - 97.0 fL    MCH 25.9 (L) 26.6 - 33.0 pg    MCHC 32.6 31.5 - 35.7 g/dL    RDW 15.7 (H) 12.3 - 15.4 %    RDW-SD 43.9 37.0 - 54.0 fl    MPV 9.8 6.0 - 12.0 fL    Platelets 275 140 - 450 10*3/mm3    Neutrophil % 59.2 42.7 - 76.0 %    Lymphocyte % 25.6 19.6 - 45.3 %    Monocyte % 8.6 5.0 - 12.0 %    Eosinophil % 5.9 0.3 - 6.2 %    Basophil % 0.4 0.0 - 1.5 %    Immature Grans % 0.3 0.0 - 0.5 %    Neutrophils, Absolute 5.33 1.70 - 7.00 10*3/mm3    Lymphocytes, Absolute 2.31 0.70 - 3.10 10*3/mm3    Monocytes, Absolute 0.78 0.10 - 0.90 10*3/mm3    Eosinophils, Absolute 0.53 (H) 0.00 - 0.40 10*3/mm3    Basophils, Absolute 0.04 0.00 - 0.20 10*3/mm3    Immature Grans, Absolute 0.03 0.00 - 0.05 10*3/mm3    nRBC 0.0 0.0 - 0.2 /100 WBC   BNP    Collection Time: 08/16/23  2:58 PM    Specimen: Blood   Result Value Ref Range    proBNP 65.5 0.0 - 900.0 pg/mL   POC Glucose Once    Collection Time: 08/16/23  3:04 PM    Specimen: Blood   Result Value Ref Range    Glucose 113 70 - 130 mg/dL   Urinalysis With Microscopic If Indicated (No Culture) - Urine, Clean Catch    Collection Time: 08/16/23  3:29 PM    Specimen: Urine, Clean Catch   Result Value Ref Range    Color, UA Yellow Yellow, Straw    Appearance, UA Clear Clear    pH, UA 5.5 5.0  - 8.0    Specific Gravity, UA 1.022 1.005 - 1.030    Glucose, UA Negative Negative    Ketones, UA Negative Negative    Bilirubin, UA Negative Negative    Blood, UA Negative Negative    Protein, UA Negative Negative    Leuk Esterase, UA Negative Negative    Nitrite, UA Negative Negative    Urobilinogen, UA 0.2 E.U./dL 0.2 - 1.0 E.U./dL       Ordered the above labs and reviewed the results.        RADIOLOGY  XR Chest 1 View    Result Date: 8/16/2023  XR CHEST 1 VW-  HISTORY: Dizziness, shortness of air.  COMPARISON: Chest radiograph 12/2/2022  FINDINGS:  2 views of the chest were obtained.  Support Devices:  None. Cardiac Silhouette/Mediastinum/Ellyn: The cardiac silhouette is normal in size. The descending aorta is slightly tortuous, similar to prior. Lungs/Pleural Spaces:  The lungs and pleural spaces are clear. Chest Wall/Diaphragm/Upper Abdomen: There is multilevel degenerative disc disease.   CONCLUSION(S):   1.  No focal consolidation or effusion.  This report was finalized on 8/16/2023 4:18 PM by Dr. Shannan Stevenson M.D.       Ordered the above noted radiological studies. Reviewed by me in PACS.            PROCEDURES  Procedures              MEDICATIONS GIVEN IN ER  Medications   sodium chloride 0.9 % flush 10 mL (has no administration in time range)   sodium chloride 0.9 % flush 10 mL (has no administration in time range)   sodium chloride 0.9 % flush 10 mL (has no administration in time range)   sodium chloride 0.9 % flush 10 mL (has no administration in time range)   sodium chloride 0.9 % infusion 40 mL (has no administration in time range)   ondansetron (ZOFRAN) tablet 4 mg (has no administration in time range)     Or   ondansetron (ZOFRAN) injection 4 mg (has no administration in time range)   nitroglycerin (NITROSTAT) SL tablet 0.4 mg (has no administration in time range)   acetaminophen (TYLENOL) tablet 650 mg (has no administration in time range)   melatonin tablet 5 mg (has no administration in time  range)   hydrALAZINE (APRESOLINE) injection 10 mg (10 mg Intravenous Given 8/16/23 1540)   meclizine (ANTIVERT) tablet 25 mg (25 mg Oral Given 8/16/23 1631)                   MEDICAL DECISION MAKING, PROGRESS, and CONSULTS    All labs have been independently reviewed by me.  All radiology studies have been reviewed by me and I have also reviewed the radiology report.   EKG's independently viewed and interpreted by me.  Discussion below represents my analysis of pertinent findings related to patient's condition, differential diagnosis, treatment plan and final disposition.      Additional sources:  - Discussed/ obtained information from independent historians: N/A    - External (non-ED) record review: Patient was last admitted here in December 2022 for generalized abdominal pain.  Bidirectional endoscopy was performed.  EGD showed Lepe's esophagus.  Colonoscopy was unremarkable except for a polyp.  Head CT done in June 2022 was negative acute.    - Chronic or social conditions impacting care: N/A          Orders placed during this visit:  Orders Placed This Encounter   Procedures    XR Chest 1 View    MRI Brain Without Contrast    Dyer Draw    Comprehensive Metabolic Panel    Single High Sensitivity Troponin T    Magnesium    Urinalysis With Microscopic If Indicated (No Culture) - Urine, Clean Catch    CBC Auto Differential    BNP    CBC (No Diff)    Basic Metabolic Panel    Hemoglobin A1c    Lipid Panel    Diet: Cardiac Diets; Healthy Heart (2-3 Na+); Texture: Regular Texture (IDDSI 7); Fluid Consistency: Thin (IDDSI 0)    Undress & Gown    Vital Signs    Orthostatic Blood Pressure    Monitor Blood Pressure    Pulse Oximetry, Continuous    Nursing swallow assessment    Intake & Output    Weigh Patient    Telemetry - Maintain IV Access    Telemetry - Place Orders & Notify Provider of Results When Patient Experiences Acute Chest Pain, Dysrhythmia or Respiratory Distress    May Be Off Telemetry for Tests    Vital  Signs    Code Status and Medical Interventions:    Inpatient Neurology Consult Stroke    PT Consult: Eval & Treat Other; vestibular    Oxygen Therapy- Nasal Cannula; Titrate 1-6 LPM Per SpO2; 90 - 95%    POC Glucose Once    POC Glucose Once    ECG 12 Lead ED Triage Standing Order; Weak / Dizzy / AMS    Insert Peripheral IV    Insert Peripheral IV    Insert Peripheral IV    Initiate ED Observation Status    Fall Precautions    CBC & Differential    Green Top (Gel)    Lavender Top    Gold Top - SST    Light Blue Top         Additional orders considered but not ordered:  CTA head/neck: Patient's symptoms have been ongoing for 3 days.  He is not interventional candidate.  He is going to be admitted.  The studies can be obtained while he is hospitalized.        Differential diagnosis:    Benign paroxysmal positional vertigo, vestibular neuritis, labyrinthitis, M‚niŠre's disease, TIA, CVA, orthostasis      Independent interpretation of labs, radiology studies, and discussions with consultants:  ED Course as of 08/16/23 1632   Wed Aug 16, 2023   1521 Patient remains hypertensive with a blood pressure of 201/122.  He took his usual dose of hydralazine and lisinopril this morning but did not take amlodipine.  He will be given a dose of IV hydralazine.  He is not a thrombolytic or interventional candidate as his symptoms began over 72 hours ago. [WH]   1522 EKG personally interpreted by me.  My personal interpretation is:          EKG time: 1455  Rhythm/Rate: Sinus rhythm, rate 90  P waves and DC: Normal  QRS, axis: Normal axis, inferior Q waves  ST and T waves: Nonspecific ST/T wave changes in the inferior leads    Interpreted Contemporaneously by me, independently viewed  EKG is not significantly changed compared to prior EKG done on 12/8/2022   [WH]   1536 BP(!): 166/115 [WH]   1536 Magnesium: 2.4 [WH]   1536 HS Troponin T: 6 [WH]   1536 WBC: 9.02 [WH]   1536 Hemoglobin: 15.2 [WH]   1536 BUN: 15 [WH]   1536 Creatinine:  1.09 [WH]   1604 BP: 168/94 [WH]   1604 Chest x-ray personally interpreted by me.  My personal interpretation is: Heart size is borderline.  Lungs are clear.  No pleural effusion. [WH]   1614 Test results discussed with the patient.  His blood pressure has improved and is currently 160/93.  Shared decision-making was discussed.  Admission was recommended for further work-up.  He is agreeable to being admitted.  He will be given a dose of meclizine. [WH]   1615 Case discussed with ZHOU Ricks, and she agrees to admit the patient to Dr. Lindo.  Pertinent history, exam findings, test results, ED course, and diagnoses were discussed with her. [WH]   1621 Patient presented to the ED complaining of dizziness and unsteady gait for the past 3 days.  Symptoms only occur when he is standing and walking.  On initial exam, gait was slightly ataxic.  He had some fatigable horizontal nystagmus.  Stroke score was 0.  Patient was initially quite hypertensive.  Blood pressure improved with 1 dose of IV hydralazine.  Patient's symptoms may be due to BPPV but he will be admitted for further evaluation. [WH]      ED Course User Index  [WH] Akash Webb MD               DIAGNOSIS  Final diagnoses:   Dizziness   Unsteady gait   Poorly-controlled hypertension         DISPOSITION  ADMISSION    Discussed treatment plan and reason for admission with pt/family and admitting physician.  Pt/family voiced understanding of the plan for admission for further testing/treatment as needed.               Latest Documented Vital Signs:  As of 16:32 EDT  BP- 160/93 HR- 80 Temp- 97.2 øF (36.2 øC) (Tympanic) O2 sat- 95%              --    Please note that portions of this were completed with a voice recognition program.       Note Disclaimer: At [facility], we believe that sharing information builds trust and better relationships. You are receiving this note because you are receiving care at [facility] or recently visited. It is  possible you will see health information before a provider has talked with you about it. This kind of information can be easy to misunderstand. To help you fully understand what it means for your health, we urge you to discuss this note with your provider.             Akash Webb MD  08/16/23 8518       Akash Webb MD  08/16/23 6820

## 2023-08-17 ENCOUNTER — APPOINTMENT (OUTPATIENT)
Dept: CARDIOLOGY | Facility: HOSPITAL | Age: 58
End: 2023-08-17
Payer: COMMERCIAL

## 2023-08-17 PROBLEM — I63.9 ACUTE CVA (CEREBROVASCULAR ACCIDENT): Status: ACTIVE | Noted: 2023-08-17

## 2023-08-17 LAB
ANION GAP SERPL CALCULATED.3IONS-SCNC: 12.3 MMOL/L (ref 5–15)
AORTIC ARCH: 3.4 CM
AORTIC DIMENSIONLESS INDEX: 1 (DI)
ASCENDING AORTA: 3.5 CM
BH CV ECHO LEFT VENTRICLE GLOBAL LONGITUDINAL STRAIN: -21.3 %
BH CV ECHO MEAS - ACS: 2.03 CM
BH CV ECHO MEAS - AO MAX PG: 18 MMHG
BH CV ECHO MEAS - AO MEAN PG: 11.8 MMHG
BH CV ECHO MEAS - AO ROOT DIAM: 3.6 CM
BH CV ECHO MEAS - AO V2 MAX: 212.4 CM/SEC
BH CV ECHO MEAS - AO V2 VTI: 36.5 CM
BH CV ECHO MEAS - AVA(I,D): 3.9 CM2
BH CV ECHO MEAS - EDV(CUBED): 104.5 ML
BH CV ECHO MEAS - EDV(MOD-SP2): 89 ML
BH CV ECHO MEAS - EDV(MOD-SP4): 93 ML
BH CV ECHO MEAS - EF(MOD-BP): 60 %
BH CV ECHO MEAS - EF(MOD-SP2): 62.9 %
BH CV ECHO MEAS - EF(MOD-SP4): 60.2 %
BH CV ECHO MEAS - ESV(CUBED): 27.3 ML
BH CV ECHO MEAS - ESV(MOD-SP2): 33 ML
BH CV ECHO MEAS - ESV(MOD-SP4): 37 ML
BH CV ECHO MEAS - FS: 36.1 %
BH CV ECHO MEAS - IVS/LVPW: 1.02 CM
BH CV ECHO MEAS - IVSD: 1.23 CM
BH CV ECHO MEAS - LAT PEAK E' VEL: 9.8 CM/SEC
BH CV ECHO MEAS - LV MASS(C)D: 216.9 GRAMS
BH CV ECHO MEAS - LV MAX PG: 13.5 MMHG
BH CV ECHO MEAS - LV MEAN PG: 7.9 MMHG
BH CV ECHO MEAS - LV V1 MAX: 184 CM/SEC
BH CV ECHO MEAS - LV V1 VTI: 36.4 CM
BH CV ECHO MEAS - LVIDD: 4.7 CM
BH CV ECHO MEAS - LVIDS: 3 CM
BH CV ECHO MEAS - LVOT AREA: 4 CM2
BH CV ECHO MEAS - LVOT DIAM: 2.24 CM
BH CV ECHO MEAS - LVPWD: 1.21 CM
BH CV ECHO MEAS - MED PEAK E' VEL: 7.7 CM/SEC
BH CV ECHO MEAS - MV A DUR: 0.13 SEC
BH CV ECHO MEAS - MV A MAX VEL: 98.5 CM/SEC
BH CV ECHO MEAS - MV DEC SLOPE: 346.1 CM/SEC2
BH CV ECHO MEAS - MV DEC TIME: 267 MSEC
BH CV ECHO MEAS - MV E MAX VEL: 74.9 CM/SEC
BH CV ECHO MEAS - MV E/A: 0.76
BH CV ECHO MEAS - MV MAX PG: 4.7 MMHG
BH CV ECHO MEAS - MV MEAN PG: 1.75 MMHG
BH CV ECHO MEAS - MV P1/2T: 88.1 MSEC
BH CV ECHO MEAS - MV V2 VTI: 26 CM
BH CV ECHO MEAS - MVA(P1/2T): 2.5 CM2
BH CV ECHO MEAS - MVA(VTI): 5.5 CM2
BH CV ECHO MEAS - PA V2 MAX: 131.7 CM/SEC
BH CV ECHO MEAS - PULM A REVS DUR: 0.14 SEC
BH CV ECHO MEAS - PULM A REVS VEL: 34.7 CM/SEC
BH CV ECHO MEAS - PULM DIAS VEL: 29.3 CM/SEC
BH CV ECHO MEAS - PULM S/D: 2.15
BH CV ECHO MEAS - PULM SYS VEL: 63.1 CM/SEC
BH CV ECHO MEAS - RV MAX PG: 4.1 MMHG
BH CV ECHO MEAS - RV V1 MAX: 101.2 CM/SEC
BH CV ECHO MEAS - RV V1 VTI: 21.1 CM
BH CV ECHO MEAS - SV(LVOT): 144 ML
BH CV ECHO MEAS - SV(MOD-SP2): 56 ML
BH CV ECHO MEAS - SV(MOD-SP4): 56 ML
BH CV ECHO MEAS - TAPSE (>1.6): 3 CM
BH CV ECHO MEASUREMENTS AVERAGE E/E' RATIO: 8.56
BH CV ECHO SHUNT ASSESSMENT PERFORMED (HIDDEN SCRIPTING): 1
BH CV XLRA - TDI S': 21.2 CM/SEC
BUN SERPL-MCNC: 13 MG/DL (ref 6–20)
BUN/CREAT SERPL: 14.8 (ref 7–25)
CALCIUM SPEC-SCNC: 8.8 MG/DL (ref 8.6–10.5)
CHLORIDE SERPL-SCNC: 107 MMOL/L (ref 98–107)
CO2 SERPL-SCNC: 21.7 MMOL/L (ref 22–29)
CREAT SERPL-MCNC: 0.88 MG/DL (ref 0.76–1.27)
DEPRECATED RDW RBC AUTO: 43.7 FL (ref 37–54)
EGFRCR SERPLBLD CKD-EPI 2021: 100.9 ML/MIN/1.73
ERYTHROCYTE [DISTWIDTH] IN BLOOD BY AUTOMATED COUNT: 15.6 % (ref 12.3–15.4)
GLUCOSE BLDC GLUCOMTR-MCNC: 103 MG/DL (ref 70–130)
GLUCOSE BLDC GLUCOMTR-MCNC: 99 MG/DL (ref 70–130)
GLUCOSE SERPL-MCNC: 96 MG/DL (ref 65–99)
HCT VFR BLD AUTO: 44 % (ref 37.5–51)
HGB BLD-MCNC: 14.5 G/DL (ref 13–17.7)
LEFT ATRIUM VOLUME INDEX: 28.5 ML/M2
MCH RBC QN AUTO: 25.9 PG (ref 26.6–33)
MCHC RBC AUTO-ENTMCNC: 33 G/DL (ref 31.5–35.7)
MCV RBC AUTO: 78.7 FL (ref 79–97)
PLATELET # BLD AUTO: 233 10*3/MM3 (ref 140–450)
PMV BLD AUTO: 10.3 FL (ref 6–12)
POTASSIUM SERPL-SCNC: 3.9 MMOL/L (ref 3.5–5.2)
RBC # BLD AUTO: 5.59 10*6/MM3 (ref 4.14–5.8)
SINUS: 3.2 CM
SODIUM SERPL-SCNC: 141 MMOL/L (ref 136–145)
STJ: 3.2 CM
WBC NRBC COR # BLD: 8.26 10*3/MM3 (ref 3.4–10.8)

## 2023-08-17 PROCEDURE — 80048 BASIC METABOLIC PNL TOTAL CA: CPT | Performed by: PHYSICIAN ASSISTANT

## 2023-08-17 PROCEDURE — 82948 REAGENT STRIP/BLOOD GLUCOSE: CPT

## 2023-08-17 PROCEDURE — 99222 1ST HOSP IP/OBS MODERATE 55: CPT | Performed by: INTERNAL MEDICINE

## 2023-08-17 PROCEDURE — 93356 MYOCRD STRAIN IMG SPCKL TRCK: CPT

## 2023-08-17 PROCEDURE — 97530 THERAPEUTIC ACTIVITIES: CPT

## 2023-08-17 PROCEDURE — 93306 TTE W/DOPPLER COMPLETE: CPT | Performed by: INTERNAL MEDICINE

## 2023-08-17 PROCEDURE — 93356 MYOCRD STRAIN IMG SPCKL TRCK: CPT | Performed by: INTERNAL MEDICINE

## 2023-08-17 PROCEDURE — 93306 TTE W/DOPPLER COMPLETE: CPT

## 2023-08-17 PROCEDURE — G0378 HOSPITAL OBSERVATION PER HR: HCPCS

## 2023-08-17 PROCEDURE — 97165 OT EVAL LOW COMPLEX 30 MIN: CPT | Performed by: OCCUPATIONAL THERAPIST

## 2023-08-17 PROCEDURE — 99214 OFFICE O/P EST MOD 30 MIN: CPT | Performed by: PSYCHIATRY & NEUROLOGY

## 2023-08-17 PROCEDURE — 97161 PT EVAL LOW COMPLEX 20 MIN: CPT

## 2023-08-17 PROCEDURE — 85027 COMPLETE CBC AUTOMATED: CPT | Performed by: PHYSICIAN ASSISTANT

## 2023-08-17 RX ORDER — HYDRALAZINE HYDROCHLORIDE 50 MG/1
100 TABLET, FILM COATED ORAL EVERY 8 HOURS SCHEDULED
Status: DISCONTINUED | OUTPATIENT
Start: 2023-08-17 | End: 2023-08-19 | Stop reason: HOSPADM

## 2023-08-17 RX ORDER — LISINOPRIL 40 MG/1
40 TABLET ORAL
Status: DISCONTINUED | OUTPATIENT
Start: 2023-08-17 | End: 2023-08-19 | Stop reason: HOSPADM

## 2023-08-17 RX ORDER — ALPRAZOLAM 0.25 MG/1
0.25 TABLET ORAL ONCE
Status: COMPLETED | OUTPATIENT
Start: 2023-08-17 | End: 2023-08-17

## 2023-08-17 RX ORDER — AMLODIPINE BESYLATE 5 MG/1
5 TABLET ORAL DAILY
Status: DISCONTINUED | OUTPATIENT
Start: 2023-08-17 | End: 2023-08-17

## 2023-08-17 RX ORDER — CLOPIDOGREL BISULFATE 75 MG/1
75 TABLET ORAL DAILY
Status: DISCONTINUED | OUTPATIENT
Start: 2023-08-17 | End: 2023-08-19 | Stop reason: HOSPADM

## 2023-08-17 RX ORDER — SODIUM CHLORIDE 0.9 % (FLUSH) 0.9 %
10 SYRINGE (ML) INJECTION AS NEEDED
Status: DISCONTINUED | OUTPATIENT
Start: 2023-08-17 | End: 2023-08-19 | Stop reason: HOSPADM

## 2023-08-17 RX ORDER — SPIRONOLACTONE 25 MG/1
25 TABLET ORAL DAILY
Status: DISCONTINUED | OUTPATIENT
Start: 2023-08-17 | End: 2023-08-19 | Stop reason: HOSPADM

## 2023-08-17 RX ORDER — ATORVASTATIN CALCIUM 80 MG/1
80 TABLET, FILM COATED ORAL NIGHTLY
Status: DISCONTINUED | OUTPATIENT
Start: 2023-08-17 | End: 2023-08-19 | Stop reason: HOSPADM

## 2023-08-17 RX ORDER — AMLODIPINE BESYLATE 5 MG/1
5 TABLET ORAL DAILY
Status: DISCONTINUED | OUTPATIENT
Start: 2023-08-18 | End: 2023-08-19 | Stop reason: HOSPADM

## 2023-08-17 RX ORDER — SODIUM CHLORIDE 9 MG/ML
40 INJECTION, SOLUTION INTRAVENOUS AS NEEDED
Status: DISCONTINUED | OUTPATIENT
Start: 2023-08-17 | End: 2023-08-19 | Stop reason: HOSPADM

## 2023-08-17 RX ORDER — SODIUM CHLORIDE 0.9 % (FLUSH) 0.9 %
10 SYRINGE (ML) INJECTION EVERY 12 HOURS SCHEDULED
Status: DISCONTINUED | OUTPATIENT
Start: 2023-08-17 | End: 2023-08-19 | Stop reason: HOSPADM

## 2023-08-17 RX ORDER — AMLODIPINE BESYLATE 10 MG/1
10 TABLET ORAL DAILY
Status: DISCONTINUED | OUTPATIENT
Start: 2023-08-18 | End: 2023-08-17

## 2023-08-17 RX ADMIN — Medication 10 ML: at 11:41

## 2023-08-17 RX ADMIN — HYDRALAZINE HYDROCHLORIDE 50 MG: 50 TABLET, FILM COATED ORAL at 07:16

## 2023-08-17 RX ADMIN — LISINOPRIL 40 MG: 20 TABLET ORAL at 11:38

## 2023-08-17 RX ADMIN — ALPRAZOLAM 0.25 MG: 0.25 TABLET ORAL at 14:39

## 2023-08-17 RX ADMIN — CLOPIDOGREL BISULFATE 75 MG: 75 TABLET, FILM COATED ORAL at 11:39

## 2023-08-17 RX ADMIN — HYDRALAZINE HYDROCHLORIDE 100 MG: 50 TABLET, FILM COATED ORAL at 21:58

## 2023-08-17 RX ADMIN — HYDRALAZINE HYDROCHLORIDE 50 MG: 50 TABLET, FILM COATED ORAL at 14:39

## 2023-08-17 RX ADMIN — ATORVASTATIN CALCIUM 80 MG: 80 TABLET, FILM COATED ORAL at 21:58

## 2023-08-17 RX ADMIN — VILAZODONE HYDROCHLORIDE 40 MG: 40 TABLET, FILM COATED ORAL at 14:38

## 2023-08-17 RX ADMIN — SPIRONOLACTONE 25 MG: 25 TABLET ORAL at 11:39

## 2023-08-17 NOTE — PROGRESS NOTES
Name: Flako Coreas ADMIT: 2023   : 1965  PCP: Akash Rodriguez Jr., DO    MRN: [id number] LOS: 0 days   AGE/SEX: 58 y.o. male  ROOM: 115/1     Subjective   Subjective     He is still dizzy with standing. No CP SOB new focal neuro deficits nausea or vomiting        Objective   Objective   Vital Signs  Temp:  [97.2 øF (36.2 øC)-97.9 øF (36.6 øC)] 97.5 øF (36.4 øC)  Heart Rate:  [] 67  Resp:  [18-20] 18  BP: (148-245)/() 165/99  SpO2:  [94 %-97 %] 95 %  on   ;   Device (Oxygen Therapy): room air  Body mass index is 31.46 kg/mý.  Physical Exam  Vitals reviewed.   Constitutional:       Appearance: He is well-developed. He is ill-appearing.   HENT:      Head: Normocephalic and atraumatic.      Mouth/Throat:      Mouth: Mucous membranes are moist.   Cardiovascular:      Rate and Rhythm: Normal rate and regular rhythm.   Pulmonary:      Effort: Pulmonary effort is normal. No respiratory distress.      Breath sounds: Normal breath sounds.   Abdominal:      General: Bowel sounds are normal. There is no distension.      Palpations: Abdomen is soft.      Tenderness: There is no abdominal tenderness.   Skin:     General: Skin is warm and dry.   Neurological:      Mental Status: He is alert and oriented to person, place, and time.   Psychiatric:         Mood and Affect: Mood normal.         Behavior: Behavior normal.     Results Review     I reviewed the patient's new clinical results.  Results from last 7 days   Lab Units 23  0434 23  1458   WBC 10*3/mm3 8.26 9.02   HEMOGLOBIN g/dL 14.5 15.2   PLATELETS 10*3/mm3 233 275     Results from last 7 days   Lab Units 23  0434 23  1458   SODIUM mmol/L 141 139   POTASSIUM mmol/L 3.9 3.8   CHLORIDE mmol/L 107 104   CO2 mmol/L 21.7* 19.9*   BUN mg/dL 13 15   CREATININE mg/dL 0.88 1.09   GLUCOSE mg/dL 96 124*   EGFR mL/min/1.73 100.9 79.7     Results from last 7 days   Lab Units 23  1458   ALBUMIN g/dL 4.8   BILIRUBIN  mg/dL 0.2   ALK PHOS U/L 74   AST (SGOT) U/L 13   ALT (SGPT) U/L 23     Results from last 7 days   Lab Units 08/17/23  0434 08/16/23  1458   CALCIUM mg/dL 8.8 9.9   ALBUMIN g/dL  --  4.8   MAGNESIUM mg/dL  --  2.4       Hemoglobin A1C   Date/Time Value Ref Range Status   08/16/2023 1458 5.20 4.80 - 5.60 % Final     Glucose   Date/Time Value Ref Range Status   08/16/2023 1504 113 70 - 130 mg/dL Final       No radiology results for the last day    I have personally reviewed all medications:  Scheduled Medications  amLODIPine, 5 mg, Oral, Daily  atorvastatin, 80 mg, Oral, Nightly  clopidogrel, 75 mg, Oral, Daily  hydrALAZINE, 50 mg, Oral, Q8H  lisinopril, 40 mg, Oral, Q24H  sodium chloride, 10 mL, Intravenous, Q12H  sodium chloride, 10 mL, Intravenous, Q12H  spironolactone, 25 mg, Oral, Daily  vilazodone, 40 mg, Oral, Daily    Infusions   Diet  Diet: Diabetic Diets; Consistent Carbohydrate; Texture: Regular Texture (IDDSI 7); Fluid Consistency: Thin (IDDSI 0)  Results from last 7 days   Lab Units 08/16/23  1458   CHOLESTEROL mg/dL 194   TRIGLYCERIDES mg/dL 194*   HDL CHOL mg/dL 41   LDL CHOL mg/dL 119*       I have personally reviewed:  [x]  Laboratory   [x]  Microbiology   [x]  Radiology   [x]  EKG/Telemetry  [x]  Cardiology/Vascular   []  Pathology    []  Records       Assessment/Plan     Active Hospital Problems    Diagnosis  POA    **Dizziness [R42]  Yes      Resolved Hospital Problems   No resolved problems to display.       58 y.o. male admitted with Dizziness and ataxia that was preceded by vomiting and  nausea which has resolved but still dizzy. BP elevated at home 180/100s and up to 245/154 in observation  CTA head and neck, MRI brain with left cerebellum infarcts     CVA left cerebella    Dizziness/ ataxia  Neurology following.   Eco pending.   ASA Plavix   PT OT      HTN emergency  Cardioligy following  At home Poorly controlled 180/100 in ER obs up to  245/154. Currently 165/99 with Norvasc 5mg (decreased  from 10mg by cardiology), hydralazine 50 mg every 8 hours, lisinopril 40 mg daily, Aldactone 25 mg.       Hypertriglyceridemia/ HLD  Trigs 194,     Continue high-dose statin        DVT prophylaxis.  Full code.  Discussed with patient.  Anticipate discharge home with home health tomorrow..      JANEEN Snow  [facility]  08/17/23  11:18 EDT

## 2023-08-17 NOTE — PROGRESS NOTES
[facility] Acute Inpt Rehab Note     Referral received via stroke order set.  Please note this is a screening only, rehab admissions will not actively be evaluating this patient.  If felt patient is appropriate for our services once therapies begin, please call our office at [phone], to initiate a full referral.    Thank you,   Ijeoma Thakkar RN   Rehab Admission Nurse

## 2023-08-17 NOTE — PLAN OF CARE
Goal Outcome Evaluation:  Plan of Care Reviewed With: patient           Outcome Evaluation: Pt is a 57 yo M who was admitted with dizziness and difficulty walking. 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 Pt reports dizziness, which he describes as his head feeling heavy, and gait are much improved since admission. Pt found to have elevated blood pressures during PT session. Pt demonstrates adequate strength and balance to perform functional mobility and gait independently. Pt was able to ambulate in the [place] with supervision. Pt's gait is very fast and pt was educated on safety and to slow down especially if feeling dizzy or light headed. Pt voiced understanding. Pt has no acute care PT needs at this time as he was able to ambulate without assistance. Pt may benefit from OPPT to address balance and safety if it does interfere with his ADLS upon discharge.      Anticipated Discharge Disposition (PT): home

## 2023-08-17 NOTE — PLAN OF CARE
Goal Outcome Evaluation:   Patient admitted for dizziness.   Outcome summary: Patient AO x 4, room air, up with stand-by assistance. Has been using urinal. Patient's BP remains high even with medication. Patient exhibited anxiety last night and was given medication, anxiety was resolved. Regular diet. Cardiology, Neurology an PT all consulted for today.

## 2023-08-17 NOTE — THERAPY EVALUATION
Patient Name: Flako Coreas  : 1965    MRN: [id number]                              Today's Date: 2023       Admit Date: 2023    Visit Dx:     ICD-10-CM ICD-9-CM   1. Dizziness  R42 780.4   2. Unsteady gait  R26.81 781.2   3. Poorly-controlled hypertension  I10 401.9     Patient Active Problem List   Diagnosis    Mixed anxiety depressive disorder    Mixed hyperlipidemia    Essential hypertension    Diverticulosis    Nodule of spleen    Chronic bilateral lower abdominal pain    Lepe's esophagus without dysplasia    GERD (gastroesophageal reflux disease)    History of esophagitis    Dizziness    Acute CVA (cerebrovascular accident)     Past Medical History:   Diagnosis Date    ADHD (attention deficit hyperactivity disorder)     On going issue    Anxiety     Lepe's esophagus     Benign prostatic hyperplasia Surgery in 2021    Clotting disorder Intestinal    Depression     Diverticulitis     Diverticulosis     Duodenitis     Enteritis     Failure to thrive (child)     Family history of blood clots     GERD (gastroesophageal reflux disease)     Hyperlipidemia     Hypertension     Hypertensive emergency     Low testosterone     Microcytosis     Pancreatitis     Pneumonia     PONV (postoperative nausea and vomiting)     Seasonal affective disorder     Shoulder injury     Unexplained weight loss      Past Surgical History:   Procedure Laterality Date    COLON SURGERY      COLONOSCOPY      COLONOSCOPY N/A 2022    Procedure: COLONOSCOPY INTO CECUM WITH HOT SNARE POLYPECTOMY;  Surgeon: Albert Gallo MD;  Location: [facility] ENDOSCOPY;  Service: Gastroenterology;  Laterality: N/A;  PRE- GI BLEED  POST- DIVERTICULOSIS, POLYP    ENDOSCOPY N/A 12/10/2022    Procedure: ESOPHAGOGASTRODUODENOSCOPY;  Surgeon: Albert Gallo MD;  Location: [facility] ENDOSCOPY;  Service: Gastroenterology;  Laterality: N/A;  PRE- DARK STOOLS  POST- BARRETTS ESOPHAGUS    FRACTURE SURGERY      for broken arm     FRACTURE SURGERY      for broken hand    INCISION AND DRAINAGE ABSCESS  2015    anal    PROSTATE SURGERY      SMALL INTESTINE SURGERY      TONSILLECTOMY        General Information       Row Name 08/17/23 1403          Physical Therapy Time and Intention    Document Type evaluation  -     Mode of Treatment individual therapy;physical therapy  -       Row Name 08/17/23 1403          General Information    Prior Level of Function independent:;gait;transfer;bed mobility  -     Existing Precautions/Restrictions no known precautions/restrictions  -     Barriers to Rehab none identified  -       Row Name 08/17/23 1403          Living Environment    People in Home alone  -       Row Name 08/17/23 1403          Cognition    Orientation Status (Cognition) oriented x 4  -       Row Name 08/17/23 1403          Safety Issues, Functional Mobility    Comment, Safety Issues/Impairments (Mobility) pt reports waves of feeling light headed during ambulation  -               User Key  (r) = Recorded By, (t) = Taken By, (c) = Cosigned By      Initials Name Provider Type    Marva Davis, PT Physical Therapist                   Mobility       Row Name 08/17/23 1403          Bed Mobility    Bed Mobility supine-sit;sit-supine  -     Supine-Sit [place] (Bed Mobility) independent  -     Sit-Supine [place] (Bed Mobility) independent  -       Row Name 08/17/23 1403          Sit-Stand Transfer    Sit-Stand [place] (Transfers) independent  -       Row Name 08/17/23 1403          Gait/Stairs (Locomotion)    [place] Level (Gait) supervision  -     Distance in Feet (Gait) 150  -     Comment, (Gait/Stairs) pt walks very quickly, no LOB or impaired gait mechanics noted, pt reports waves of lightedness with ambulation  -               User Key  (r) = Recorded By, (t) = Taken By, (c) = Cosigned By      Initials Name Provider Type    Marva Davis, PT Physical Therapist                    Obj/Interventions       Row Name 08/17/23 1404          Range of Motion Comprehensive    General Range of Motion no range of motion deficits identified  -CH       Row Name 08/17/23 1404          Strength Comprehensive (MMT)    General Manual Muscle Testing (MMT) Assessment no strength deficits identified  -CH       Row Name 08/17/23 1404          Motor Skills    Therapeutic Exercise --  10 reps B LE AP, LAQ,and seated marches  -               User Key  (r) = Recorded By, (t) = Taken By, (c) = Cosigned By      Initials Name Provider Type    CH Marva Voss, PT Physical Therapist                   Goals/Plan    No documentation.                  Clinical Impression       Row Name 08/17/23 1405          Pain    Pretreatment Pain Rating 0/10 - no pain  -     Posttreatment Pain Rating 0/10 - no pain  -CH       Row Name 08/17/23 1405          Plan of Care Review    Plan of Care Reviewed With patient  -     Outcome Evaluation Pt is a 59 yo M who was admitted with dizziness and difficulty walking. 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 Pt reports dizziness, which he describes as his head feeling heavy, and gait are much improved since admission. Pt found to have elevated blood pressures during PT session. Pt demonstrates adequate strength and balance to perform functional mobility and gait independently. Pt was able to ambulate in the [place] with supervision. Pt's gait is very fast and pt was educated on safety and to slow down especially if feeling dizzy or light headed. Pt voiced understanding. Pt has no acute care PT needs at this time as he was able to ambulate without assistance. Pt may benefit from OPPT to address balance and safety if it does interfere with his ADLS upon discharge.  -CH       Row Name 08/17/23 1405          Therapy Assessment/Plan (PT)    Criteria for Skilled Interventions Met (PT) no problems identified which require skilled intervention  -     Therapy Frequency (PT) evaluation only   -       Row Name 08/17/23 1405          Positioning and Restraints    Pre-Treatment Position in bed  -     Post Treatment Position bed  -     In Bed supine;encouraged to call for assist;call light within reach  -               User Key  (r) = Recorded By, (t) = Taken By, (c) = Cosigned By      Initials Name Provider Type     Marva Voss, PT Physical Therapist                   Outcome Measures       Row Name 08/17/23 1410 08/17/23 0800       How much help from another person do you currently need...    Turning from your back to your side while in flat bed without using bedrails? 4  - 4  -JA    Moving from lying on back to sitting on the side of a flat bed without bedrails? 4  - 4  -JA    Moving to and from a bed to a chair (including a wheelchair)? 4  - 4  -JA    Standing up from a chair using your arms (e.g., wheelchair, bedside chair)? 4  - 4  -JA    Climbing 3-5 steps with a railing? 3  - 3  -JA    To walk in hospital room? 3  - 3  -JA    AM-PAC 6 Clicks Score (PT) 22  - 22  -JA    Highest level of mobility 7 --> Walked 25 feet or more  - 7 --> Walked 25 feet or more  -      Row Name 08/17/23 1411          Modified [place]ke Scale    Modified [place]ke Scale 1 - No significant disability despite symptoms.  Able to carry out all usual duties and activities.  -       Row Name 08/17/23 1411 08/17/23 1410       Functional Assessment    Outcome Measure Options Modified [place]  - AM-PAC 6 Clicks Basic Mobility (PT)  -              User Key  (r) = Recorded By, (t) = Taken By, (c) = Cosigned By      Initials Name Provider Type     Marva Voss, PT Physical Therapist    Yamilet Monsivais RN Registered Nurse                                 Physical Therapy Education       Title: PT OT SLP Therapies (Done)       Topic: Physical Therapy (Done)       Point: Mobility training (Done)       Learning Progress Summary             Patient Acceptance, E,TB,D, VU,NR by  at 8/17/2023  1411                         Point: Home exercise program (Done)       Learning Progress Summary             Patient Acceptance, E,TB,D, VU,NR by  at 8/17/2023 1411                         Point: Body mechanics (Done)       Learning Progress Summary             Patient Acceptance, E,TB,D, VU,NR by  at 8/17/2023 1411                         Point: Precautions (Done)       Learning Progress Summary             Patient Acceptance, E,TB,D, VU,NR by  at 8/17/2023 1411                                         User Key       Initials Effective Dates Name Provider Type Discipline     06/16/21 -  Marva Voss, PT Physical Therapist PT                  PT Recommendation and Plan     Plan of Care Reviewed With: patient  Outcome Evaluation: Pt is a 59 yo M who was admitted with dizziness and difficulty walking. 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 Pt reports dizziness, which he describes as his head feeling heavy, and gait are much improved since admission. Pt found to have elevated blood pressures during PT session. Pt demonstrates adequate strength and balance to perform functional mobility and gait independently. Pt was able to ambulate in the [place] with supervision. Pt's gait is very fast and pt was educated on safety and to slow down especially if feeling dizzy or light headed. Pt voiced understanding. Pt has no acute care PT needs at this time as he was able to ambulate without assistance. Pt may benefit from OPPT to address balance and safety if it does interfere with his ADLS upon discharge.     Time Calculation:         PT Charges       Row Name 08/17/23 1411             Time Calculation    Start Time 1339  -      Stop Time 1352  -      Time Calculation (min) 13 min  -      PT Received On 08/17/23  -         Time Calculation- PT    Total Timed Code Minutes- PT 8 minute(s)  -         Timed Charges    17228 - PT Therapeutic Activity Minutes 8  -         Total Minutes    Timed Charges Total  Minutes 8  -CH       Total Minutes 8  -CH                User Key  (r) = Recorded By, (t) = Taken By, (c) = Cosigned By      Initials Name Provider Type     Marva Voss, PT Physical Therapist                  Therapy Charges for Today       Code Description Service Date Service Provider Modifiers Qty    33046060524  PT THERAPEUTIC ACT EA 15 MIN 8/17/2023 Marva Voss, PT GP 1    [id number]  PT EVAL LOW COMPLEXITY 1 8/17/2023 Marva Voss, PT GP 1            PT G-Codes  Outcome Measure Options: Modified [place]  AM-PAC 6 Clicks Score (PT): 22  Modified [place] Scale: 1 - No significant disability despite symptoms.  Able to carry out all usual duties and activities.  PT Discharge Summary  Anticipated Discharge Disposition (PT): home    Marva Voss, CARIN  8/17/2023

## 2023-08-17 NOTE — THERAPY EVALUATION
Patient Name: Flako Coreas  : 1965    MRN: [id number]                              Today's Date: 2023       Admit Date: 2023    Visit Dx:     ICD-10-CM ICD-9-CM   1. Dizziness  R42 780.4   2. Unsteady gait  R26.81 781.2   3. Poorly-controlled hypertension  I10 401.9     Patient Active Problem List   Diagnosis    Mixed anxiety depressive disorder    Mixed hyperlipidemia    Essential hypertension    Diverticulosis    Nodule of spleen    Chronic bilateral lower abdominal pain    Lepe's esophagus without dysplasia    GERD (gastroesophageal reflux disease)    History of esophagitis    Dizziness    Acute CVA (cerebrovascular accident)     Past Medical History:   Diagnosis Date    ADHD (attention deficit hyperactivity disorder)     On going issue    Anxiety     Lepe's esophagus     Benign prostatic hyperplasia Surgery in 2021    Clotting disorder Intestinal    Depression     Diverticulitis     Diverticulosis     Duodenitis     Enteritis     Failure to thrive (child)     Family history of blood clots     GERD (gastroesophageal reflux disease)     Hyperlipidemia     Hypertension     Hypertensive emergency     Low testosterone     Microcytosis     Pancreatitis     Pneumonia     PONV (postoperative nausea and vomiting)     Seasonal affective disorder     Shoulder injury     Unexplained weight loss      Past Surgical History:   Procedure Laterality Date    COLON SURGERY      COLONOSCOPY      COLONOSCOPY N/A 2022    Procedure: COLONOSCOPY INTO CECUM WITH HOT SNARE POLYPECTOMY;  Surgeon: Albert Gallo MD;  Location: [facility] ENDOSCOPY;  Service: Gastroenterology;  Laterality: N/A;  PRE- GI BLEED  POST- DIVERTICULOSIS, POLYP    ENDOSCOPY N/A 12/10/2022    Procedure: ESOPHAGOGASTRODUODENOSCOPY;  Surgeon: Albert Gallo MD;  Location: [facility] ENDOSCOPY;  Service: Gastroenterology;  Laterality: N/A;  PRE- DARK STOOLS  POST- BARRETTS ESOPHAGUS    FRACTURE SURGERY      for broken arm     FRACTURE SURGERY      for broken hand    INCISION AND DRAINAGE ABSCESS  2015    anal    PROSTATE SURGERY      SMALL INTESTINE SURGERY      TONSILLECTOMY        General Information       [facility] Name 08/17/23 1528          OT Time and Intention    Document Type evaluation  -     Mode of Treatment individual therapy;occupational therapy  -[facility] Name 08/17/23 1528          General Information    Patient Profile Reviewed yes  -     Prior Level of Function independent:;all household mobility;community mobility;gait;transfer;bed mobility;ADL's  -     Existing Precautions/Restrictions fall  -     Barriers to Rehab none identified  -[facility] Name 08/17/23 1528          Living Environment    People in Home alone  -[facility] Name 08/17/23 1528          Cognition    Orientation Status (Cognition) oriented x 4  -[facility] Name 08/17/23 1528          Safety Issues, Functional Mobility    Impairments Affecting Function (Mobility) balance  -     Comment, Safety Issues/Impairments (Mobility) pt feels like his head does not feel right, at times veered to the L and stood from toilet, seemed off balance and sat back down. then stood up again and did better  -               User Key  (r) = Recorded By, (t) = Taken By, (c) = Cosigned By      Initials Name Provider Type     Divya Giron, OTR Occupational Therapist                     Mobility/ADL's       Row Name 08/17/23 1529          Bed Mobility    Supine-Sit [place] (Bed Mobility) standby assist  -     Sit-Supine [place] (Bed Mobility) independent  -     Assistive Device (Bed Mobility) bed rails;head of bed elevated  -[facility] Name 08/17/23 1529          Transfers    Transfers sit-stand transfer;toilet transfer;stand-sit transfer  -[facility] Name 08/17/23 1529          Sit-Stand Transfer    Sit-Stand [place] (Transfers) contact guard;standby assist  -[facility] Name 08/17/23 1529          Stand-Sit Transfer    Stand-Sit  [place] (Transfers) set up;contact guard  -       Row Name 08/17/23 1529          Toilet Transfer    Type (Toilet Transfer) stand-sit;sit-stand;stand pivot/stand step  -     [place] Level (Toilet Transfer) set up;contact guard  -     Comment, (Toilet Transfer) pt felt unsteady when standing from toilet and sat back down. stood a second time and OT provided CGA  -[facility] Name 08/17/23 1529          Functional Mobility    Functional Mobility- Ind. Level contact guard assist  -     Functional Mobility- Comment in room, BR, [place] fast walker.  -       Row Name 08/17/23 1529          Activities of Daily Living    BADL Assessment/Intervention toileting;lower body dressing  -KP       Row Name 08/17/23 1529          Toileting Assessment/Training    [place] Level (Toileting) toileting skills  -     Comment, (Toileting) simulated did not have to use the BR  -KP       Row Name 08/17/23 1529          Lower Body Dressing Assessment/Training    [place] Level (Lower Body Dressing) lower body dressing skills;doff;don;shoes/slippers;modified independence  -     Position (Lower Body Dressing) edge of bed sitting  -               User Key  (r) = Recorded By, (t) = Taken By, (c) = Cosigned By      Initials Name Provider Type    Divya Murillo OTR Occupational Therapist                   Obj/Interventions       [facility] Name 08/17/23 1530          Sensory Assessment (Somatosensory)    Sensory Assessment (Somatosensory) UE sensation intact  -[facility] Name 08/17/23 1530          Vision Assessment/Intervention    Visual Impairment/Limitations blurry vision  -[facility] Name 08/17/23 1530          Range of Motion Comprehensive    General Range of Motion bilateral upper extremity ROM WNL  -     Comment, General Range of Motion B UE 8/8  -[facility] Name 08/17/23 1530          Strength Comprehensive (MMT)    Comment, General Manual Muscle Testing (MMT) Assessment B UE 5/5  -[facility]  Name 08/17/23 1530          Motor Skills    Motor Skills coordination;functional endurance  -KP     Coordination WFL  -KP     Functional Endurance good-  -KP       Row Name 08/17/23 1530          Balance    Balance Assessment sitting static balance;standing static balance;standing dynamic balance  -KP     Static Sitting Balance independent  -KP     Position, Sitting Balance sitting edge of bed  -KP     Static Standing Balance standby assist;contact guard  -KP     Dynamic Standing Balance contact guard  -KP     Position/Device Used, Standing Balance supported  -KP     Balance Interventions sitting;standing;sit to stand;supported;static;dynamic;occupation based/functional task  -KP               User Key  (r) = Recorded By, (t) = Taken By, (c) = Cosigned By      Initials Name Provider Type    KP Divya Giron, OTR Occupational Therapist                   Goals/Plan       Row Name 08/17/23 1536          Transfer Goal 1 (OT)    Activity/Assistive Device (Transfer Goal 1, OT) sit-to-stand/stand-to-sit;bed-to-chair/chair-to-bed;toilet  -     [place] Level/Cues Needed (Transfer Goal 1, OT) independent  -KP     Time Frame (Transfer Goal 1, OT) short term goal (STG);2 weeks  -KP       Row Name 08/17/23 1536          Dressing Goal 1 (OT)    Activity/Device (Dressing Goal 1, OT) dressing skills, all  -KP     [place]/Cues Needed (Dressing Goal 1, OT) independent  -KP     Time Frame (Dressing Goal 1, OT) short term goal (STG);2 weeks  -KP     Progress/Outcome (Dressing Goal 1, OT) goal ongoing  -       Row Name 08/17/23 1536          Problem Specific Goal 1 (OT)    Problem Specific Goal 1 (OT) to be able to complete a 20 min task to incr independent w ADL and IADLs  -KP     Time Frame (Problem Specific Goal 1, OT) short term goal (STG);2 weeks  -KP     Progress/Outcome (Problem Specific Goal 1, OT) goal ongoing  -       Row Name 08/17/23 1536          Therapy Assessment/Plan (OT)    Planned Therapy  Interventions (OT) activity tolerance training;BADL retraining;ROM/therapeutic exercise;occupation/activity based interventions;transfer/mobility retraining;functional balance retraining  -               User Key  (r) = Recorded By, (t) = Taken By, (c) = Cosigned By      Initials Name Provider Type     Divya Giron, OTR Occupational Therapist                   Clinical Impression       Row Name 08/17/23 1533          Pain Assessment    Pretreatment Pain Rating 0/10 - no pain  -KP     Posttreatment Pain Rating 0/10 - no pain  -       Row Name 08/17/23 1533          Plan of Care Review    Plan of Care Reviewed With patient  -     Progress improving  -     Outcome Evaluation pt evaluated for OT. pt admitted from home where he lives alone and was independent PTA. pt admitted w blurry vision, was sick at home and losing his balance. pt this date reports his head does not feel right when he moves anisha sitting EOB and standing. pt did not need any A w bed mobility, SBA /CGA standing, no A w shoes, pt stood and walked in room and tsf to toilet w SBA to CGA, when he stood from the toilet he had to sit back down as he felt unsteady/did not feel quite right. pt then did better second stand. pt walked in [place] way and is a fast walker. pt did veer some but not completely unsteady. cont OT to incr ADL, balance, and tsf.  -       Row Name 08/17/23 1533          Therapy Assessment/Plan (OT)    Rehab Potential (OT) good, to achieve stated therapy goals  -     Criteria for Skilled Therapeutic Interventions Met (OT) yes;meets criteria;skilled treatment is necessary  -     Therapy Frequency (OT) 5 times/wk  -       Row Name 08/17/23 1533          Therapy Plan Review/Discharge Plan (OT)    Anticipated Discharge Disposition (OT) home  -       Row Name 08/17/23 1533          Positioning and Restraints    Pre-Treatment Position in bed  -     Post Treatment Position bed  -KP     In Bed notified nsg;encouraged  to call for assist;call light within reach;supine  -KP               User Key  (r) = Recorded By, (t) = Taken By, (c) = Cosigned By      Initials Name Provider Type     Divya Giron, OTR Occupational Therapist                   Outcome Measures       Row Name 08/17/23 1537          How much help from another is currently needed...    Putting on and taking off regular lower body clothing? 3  -KP     Bathing (including washing, rinsing, and drying) 3  -KP     Toileting (which includes using toilet bed pan or urinal) 3  -KP     Putting on and taking off regular upper body clothing 4  -KP     Taking care of personal grooming (such as brushing teeth) 4  -KP     Eating meals 4  -KP     AM-PAC 6 Clicks Score (OT) 21  -       Row Name 08/17/23 1410 08/17/23 0800       How much help from another person do you currently need...    Turning from your back to your side while in flat bed without using bedrails? 4  -CH 4  -JA    Moving from lying on back to sitting on the side of a flat bed without bedrails? 4  -CH 4  -JA    Moving to and from a bed to a chair (including a wheelchair)? 4  -CH 4  -JA    Standing up from a chair using your arms (e.g., wheelchair, bedside chair)? 4  -CH 4  -JA    Climbing 3-5 steps with a railing? 3  -CH 3  -JA    To walk in hospital room? 3  -CH 3  -JA    AM-PAC 6 Clicks Score (PT) 22  - 22  -JA    Highest level of mobility 7 --> Walked 25 feet or more  - 7 --> Walked 25 feet or more  -      Row Name 08/17/23 1537 08/17/23 1411       Modified [place] Scale    Modified [place] Scale 4 - Moderately severe disability.  Unable to walk without assistance, and unable to attend to own bodily needs without assistance.  - 1 - No significant disability despite symptoms.  Able to carry out all usual duties and activities.  -      Row Name 08/17/23 1537 08/17/23 1411       Functional Assessment    Outcome Measure Options AM-PAC 6 Clicks Daily Activity (OT);Modified [place]  -KP Modified  [place]  [facility]      Row Name 08/17/23 1410          Functional Assessment    Outcome Measure Options AM-PAC 6 Clicks Basic Mobility (PT)  -               User Key  (r) = Recorded By, (t) = Taken By, (c) = Cosigned By      Initials Name Provider Type    Divya Murillo, OTR Occupational Therapist    CH Marva Voss, PT Physical Therapist    Yamilet Monsivais, RN Registered Nurse                    Occupational Therapy Education       Title: PT OT SLP Therapies (Done)       Topic: Occupational Therapy (Done)       Point: ADL training (Done)       Description:   Instruct learner(s) on proper safety adaptation and remediation techniques during self care or transfers.   Instruct in proper use of assistive devices.                  Learning Progress Summary             Patient Acceptance, E,TB,D, VU,DU by  at 8/17/2023 1538    Comment: ed pt on role of OT. benefit of therapy, POC w. OT. ed on safety w ADL and getting up. pt had slight unsteadiness and did not feel quite right. ed on safety w all areas. pt first time up was w OT per pt report.                         Point: Home exercise program (Done)       Description:   Instruct learner(s) on appropriate technique for monitoring, assisting and/or progressing therapeutic exercises/activities.                  Learning Progress Summary             Patient Acceptance, E,TB,D, VU,DU by  at 8/17/2023 1538    Comment: ed pt on role of OT. benefit of therapy, POC w. OT. ed on safety w ADL and getting up. pt had slight unsteadiness and did not feel quite right. ed on safety w all areas. pt first time up was w OT per pt report.                         Point: Precautions (Done)       Description:   Instruct learner(s) on prescribed precautions during self-care and functional transfers.                  Learning Progress Summary             Patient Acceptance, E,TB,D, VU,DU by  at 8/17/2023 1538    Comment: ed pt on role of OT. benefit of therapy, POC w. OT.  ed on safety w ADL and getting up. pt had slight unsteadiness and did not feel quite right. ed on safety w all areas. pt first time up was w OT per pt report.                         Point: Body mechanics (Done)       Description:   Instruct learner(s) on proper positioning and spine alignment during self-care, functional mobility activities and/or exercises.                  Learning Progress Summary             Patient Acceptance, E,TB,D, VU,DU by  at 8/17/2023 7089    Comment: ed pt on role of OT. benefit of therapy, POC w. OT. ed on safety w ADL and getting up. pt had slight unsteadiness and did not feel quite right. ed on safety w all areas. pt first time up was w OT per pt report.                                         User Key       Initials Effective Dates Name Provider Type Discipline     07/11/23 -  Divya Giron, OTR Occupational Therapist OT                  OT Recommendation and Plan  Planned Therapy Interventions (OT): activity tolerance training, BADL retraining, ROM/therapeutic exercise, occupation/activity based interventions, transfer/mobility retraining, functional balance retraining  Therapy Frequency (OT): 5 times/wk  Plan of Care Review  Plan of Care Reviewed With: patient  Progress: improving  Outcome Evaluation: pt evaluated for OT. pt admitted from home where he lives alone and was independent PTA. pt admitted w blurry vision, was sick at home and losing his balance. pt this date reports his head does not feel right when he moves anisha sitting EOB and standing. pt did not need any A w bed mobility, SBA /CGA standing, no A w shoes, pt stood and walked in room and tsf to toilet w SBA to CGA, when he stood from the toilet he had to sit back down as he felt unsteady/did not feel quite right. pt then did better second stand. pt walked in [place] [address] and is a fast walker. pt did veer some but not completely unsteady. cont OT to incr ADL, balance, and tsf.     Time Calculation:    Evaluation Complexity (OT)  Review Occupational Profile/Medical/Therapy History Complexity: brief/low complexity  Assessment, Occupational Performance/Identification of Deficit Complexity: 1-3 performance deficits  Clinical Decision Making Complexity (OT): problem focused assessment/low complexity  Overall Complexity of Evaluation (OT): low complexity     Time Calculation- OT       Row Name 08/17/23 1539             Time Calculation- OT    OT Start Time 1225  -KP      OT Stop Time 1236  -KP      OT Time Calculation (min) 11 min  -KP      OT Received On 08/17/23  -      OT - Next Appointment 08/18/23  -      OT Goal Re-Cert Due Date 08/31/23  -         Untimed Charges    OT Eval/Re-eval Minutes 11  -KP         Total Minutes    Untimed Charges Total Minutes 11  -KP       Total Minutes 11  -KP                User Key  (r) = Recorded By, (t) = Taken By, (c) = Cosigned By      Initials Name Provider Type     Divya Giron OTR Occupational Therapist                  Therapy Charges for Today       Code Description Service Date Service Provider Modifiers Qty    [id number]  OT EVAL LOW COMPLEXITY 2 8/17/2023 Divya Giron OTR GO 1                 BEATA Tijerina  8/17/2023

## 2023-08-17 NOTE — PLAN OF CARE
Goal Outcome Evaluation:  Plan of Care Reviewed With: patient        Progress: improving  Outcome Evaluation: pt evaluated for OT. pt admitted from home where he lives alone and was independent PTA. pt admitted w blurry vision, was sick at home and losing his balance. pt this date reports his head does not feel right when he moves anisha sitting EOB and standing. pt did not need any A w bed mobility, SBA /CGA standing, no A w shoes, pt stood and walked in room and tsf to toilet w SBA to CGA, when he stood from the toilet he had to sit back down as he felt unsteady/did not feel quite right. pt then did better second stand. pt walked in [place] [address] and is a fast walker. pt did veer some but not completely unsteady. cont OT to incr ADL, balance, and tsf.      Anticipated Discharge Disposition (OT): home

## 2023-08-17 NOTE — PROGRESS NOTES
ED OBSERVATION PROGRESS/DISCHARGE SUMMARY    Date of Admission: 8/16/2023   LOS: 0 days   PCP: Akash Rodriguez Jr., DO    Subjective patient feeling anxious this morning, states he is still unable to walk straight    Hospital Outcome:   58-year-old male admitted to the observation unit with complaint of dizziness and gait disturbance since Sunday.  Lab work in the emergency department is essentially unremarkable.  CTA head and neck is unremarkable.  MRI brain shows 2 small infarcts in the inferior aspect of the left cerebellum.  No evidence of hemorrhage or mass effect.      Patient was seen by neurology, discussed with Dr. Kraus.  We will initiate aspirin and Plavix and start patient on statin.  Patient will be seen by physical and Occupational Therapy.  Echo currently pending.    Patient has also been hypertensive during observation unit stay.  Patient states he has had high blood pressure since he was 25 years old and it has been difficult to control.  Patient was seen by cardiology, discussed with Dr. Foster.  He is adjusting patient's blood pressure medications and will follow echo results.    I discussed the above with Viviane Thakkar on-call for [facility] who has graciously accepted the patient for admission to hospital under care of of [facility] on behalf of Dr. Marsh.  Discussed with patient who expresses understanding and is in agreement with plan.    ROS:  General: no fevers, chills  Respiratory: no cough, dyspnea  Cardiovascular: no chest pain, palpitations  Abdomen: No abdominal pain, nausea, vomiting, or diarrhea  Neurologic: No focal weakness    Objective   Physical Exam:  I have reviewed the vital signs.  Temp:  [97.2 øF (36.2 øC)-97.9 øF (36.6 øC)] 97.5 øF (36.4 øC)  Heart Rate:  [] 66  Resp:  [18-20] 18  BP: (148-245)/() 148/106  General Appearance:    Alert, cooperative, no distress  Head:    Normocephalic, atraumatic  Eyes:    Sclerae anicteric  Neck:   Supple, no mass  Lungs:  Clear to auscultation bilaterally, respirations unlabored  Heart: Regular rate and rhythm, S1 and S2 normal, no murmur, rub or gallop  Abdomen:  Soft, non-tender, bowel sounds active, nondistended  Extremities: No clubbing, cyanosis, or edema to lower extremities  Pulses:  2+ and symmetric in distal lower extremities  Skin: No rashes   Neurologic: Oriented x3, Normal strength to extremities    Results Review:    I have reviewed the labs, radiology results and diagnostic studies.    Results from last 7 days   Lab Units 08/17/23  0434   WBC 10*3/mm3 8.26   HEMOGLOBIN g/dL 14.5   HEMATOCRIT % 44.0   PLATELETS 10*3/mm3 233     Results from last 7 days   Lab Units 08/16/23  1458   SODIUM mmol/L 139   POTASSIUM mmol/L 3.8   CHLORIDE mmol/L 104   CO2 mmol/L 19.9*   BUN mg/dL 15   CREATININE mg/dL 1.09   CALCIUM mg/dL 9.9   BILIRUBIN mg/dL 0.2   ALK PHOS U/L 74   ALT (SGPT) U/L 23   AST (SGOT) U/L 13   GLUCOSE mg/dL 124*     Imaging Results (Last 24 Hours)       Procedure Component Value Units Date/Time    MRI Brain Without Contrast [[id number]] Collected: 08/16/23 2003     Updated: 08/16/23 2008    Narrative:      MR SCAN OF THE BRAIN WITHOUT CONTRAST     HISTORY: Dizziness. Ataxia.     The MR scan was performed as an emergency procedure with sagittal and  axial images without contrast. There is mild diffuse atrophy and chronic  small vessel ischemic change. There is no evidence of acute intracranial  hemorrhage or mass effect. The diffusion sequence demonstrates 2 small  areas of acute infarction in the inferior aspect of the left cerebellum  and these each measure up to 7 mm in size.     There are normal flow voids in the major vessels. There is slight  mucosal thickening involving several left ethmoid air cells. The mastoid  air cells are clear.     CONCLUSION: 2 small acute infarcts in inferior aspect of the left  cerebellum. No evidence of hemorrhage or mass effect.     This report was finalized on 8/16/2023 8:05 PM by  Dr. Reagan Mansfield M.D.       CT Angiogram Head [[id number]] Collected: 08/16/23 1921     Updated: 08/16/23 1933    Narrative:      CT ANGIOGRAPHY OF THE HEAD AND NECK WITHOUT AND WITH INTRAVENOUS  CONTRAST AND 3D RECONSTRUCTIONS     HISTORY: Dizziness and unsteady gait. Difficulty finding words.     The CT scan was performed as an emergency procedure with CT angiography  protocol through the head and neck without and with intravenous contrast  and 3D reconstructions. The following findings are present:  1. Initial noncontrast CT scan of the brain was performed. The  ventricles are normal in size and midline. There is no evidence of  intracranial hemorrhage or mass effect. The sinuses and mastoid air  cells are clear.     2. Evaluation for stenosis is based on NASCET criteria. The vertebral  arteries are patent with dominance of the right vertebral artery. The  left vertebral artery becomes extremely small in caliber distal to the  left PICA.     3. There is calcification involving the left and right carotid  bifurcations. There is no NASCET significant stenosis of the cervical  carotid arteries.     4. The intracranial CT angiography shows no stenosis of the major  vessels. The right posterior communicating artery is patent. No aneurysm  is seen.           Radiation dose reduction techniques were utilized, including automated  exposure control and exposure modulation based on body size.        This report was finalized on 8/16/2023 7:30 PM by Dr. Reagan Mansfield M.D.       CT Angiogram Neck [[id number]] Collected: 08/16/23 1921     Updated: 08/16/23 1933    Narrative:      CT ANGIOGRAPHY OF THE HEAD AND NECK WITHOUT AND WITH INTRAVENOUS  CONTRAST AND 3D RECONSTRUCTIONS     HISTORY: Dizziness and unsteady gait. Difficulty finding words.     The CT scan was performed as an emergency procedure with CT angiography  protocol through the head and neck without and with intravenous contrast  and 3D reconstructions. The  following findings are present:  1. Initial noncontrast CT scan of the brain was performed. The  ventricles are normal in size and midline. There is no evidence of  intracranial hemorrhage or mass effect. The sinuses and mastoid air  cells are clear.     2. Evaluation for stenosis is based on NASCET criteria. The vertebral  arteries are patent with dominance of the right vertebral artery. The  left vertebral artery becomes extremely small in caliber distal to the  left PICA.     3. There is calcification involving the left and right carotid  bifurcations. There is no NASCET significant stenosis of the cervical  carotid arteries.     4. The intracranial CT angiography shows no stenosis of the major  vessels. The right posterior communicating artery is patent. No aneurysm  is seen.           Radiation dose reduction techniques were utilized, including automated  exposure control and exposure modulation based on body size.        This report was finalized on 8/16/2023 7:30 PM by Dr. Reagan Mansfield M.D.       XR Chest 1 View [[id number]] Collected: 08/16/23 1617     Updated: 08/16/23 1621    Narrative:      XR CHEST 1 VW-     HISTORY: Dizziness, shortness of air.     COMPARISON: Chest radiograph 12/2/2022     FINDINGS:    2 views of the chest were obtained.     Support Devices:  None.  Cardiac Silhouette/Mediastinum/Ellyn: The cardiac silhouette is normal in  size. The descending aorta is slightly tortuous, similar to prior.  Lungs/Pleural Spaces:  The lungs and pleural spaces are clear.  Chest Wall/Diaphragm/Upper Abdomen: There is multilevel degenerative  disc disease.        CONCLUSION(S):       1.  No focal consolidation or effusion.     This report was finalized on 8/16/2023 4:18 PM by Dr. Shannan Stevenson M.D.               I have reviewed the medications.  ---------------------------------------------------------------------------------------------  Assessment & Plan   Assessment/Problem List     Dizziness      Plan:  Dizziness  Ataxia  Acute CVA  -Consult neurology, Dr. Kraus  -CTA head and neck-within normal limits   -MRI brain-2 small acute infarcts in the inferior aspect of the left cerebellum.  -Aspirin/plavix, statin  -Lipid/hemoglobin A1c  -PT to evaluate and treat  -Stroke educator consult   -Admit to hospital, Dr. Marsh/YUDI     Hypertension  -Poorly controlled  -Cardiology consulted, Dr. Foster  -Medication adjustments per cardiology  -Echocardiogram pending       Disposition: Admit to hospital, Dr. Marsh/YUDI    This note will serve as a transfer summary/progress note    Anastasiya Parekh, APRN 08/17/23 06:28 EDT    I have worn appropriate PPE during this patient encounter, sanitized my hands both with entering and exiting patient's room.      55 minutes has been spent by [facility] providers in the care of this patient while under observation status

## 2023-08-17 NOTE — PLAN OF CARE
Goal Outcome Evaluation:               Pt reports sxs drastically improving but feels anxious about having stroke.

## 2023-08-17 NOTE — CONSULTS
"Date of Consultation: 23    Referral Provider: Gabe Marsh MD    Reason for Consultation: Hypertensive emergency    Encounter Provider: Carlos Foster MD    Group of Service: [facility]     Patient Name: Flako Coreas    :1965    Chief complaint: Dizziness    History of Present Illness:  Flako Coreas is a 58 year old who does not follow with a cardiologist and has a past medical history that is significant for ADHD, BPH, GERD, HLD, and HTN (on amlodipine, hydralazine, and lisinopril). He presented to the ED with complaints of dizziness and unsteady gait. He reports that three days ago he woke up and was diaphoretic and nauseated. He has felt very unsteady and was \"bumping into walls\". He attributed this to possible food poisoning but the symptoms have persisted which was concerning to him. He has had dizziness and feels like he is falling to the right.  He is also had some mild shortness of breath.  Of note, he stopped taking his Viibryd 2-3 weeks ago and has noticed increased blood pressure at home since. He has been out of his amlodipine for a week.     He was found to be grossly hypertensive.  His blood pressure was as high as 245/154.  He was given IV hydralazine and placed back on his oral medications.  An MRI of his brain showed 2 small acute infarcts in the inferior aspect of the left cerebellum.  He did have a renal artery duplex in 2022 which showed normal bilateral renal arteries.  He has never been diagnosed with sleep apnea, although he does snore.  He had an echocardiogram which showed an ejection fraction of 65 to 70%, and a normal saline study.      Past Medical History:   Diagnosis Date    ADHD (attention deficit hyperactivity disorder)     On going issue    Anxiety     Lepe's esophagus     Benign prostatic hyperplasia Surgery in 2021    Clotting disorder Intestinal    Depression     Diverticulitis     Diverticulosis     Duodenitis     " Enteritis     Failure to thrive (child)     Family history of blood clots     GERD (gastroesophageal reflux disease)     Hyperlipidemia     Hypertension     Hypertensive emergency     Low testosterone     Microcytosis     Pancreatitis     Pneumonia     PONV (postoperative nausea and vomiting)     Seasonal affective disorder     Shoulder injury     Unexplained weight loss          Past Surgical History:   Procedure Laterality Date    COLON SURGERY      COLONOSCOPY      COLONOSCOPY N/A 12/11/2022    Procedure: COLONOSCOPY INTO CECUM WITH HOT SNARE POLYPECTOMY;  Surgeon: Albert Gallo MD;  Location:  [facility];  Service: Gastroenterology;  Laterality: N/A;  PRE- GI BLEED  POST- DIVERTICULOSIS, POLYP    ENDOSCOPY N/A 12/10/2022    Procedure: ESOPHAGOGASTRODUODENOSCOPY;  Surgeon: Albert Gallo MD;  Location:  [facility];  Service: Gastroenterology;  Laterality: N/A;  PRE- DARK STOOLS  POST- BARRETTS ESOPHAGUS    FRACTURE SURGERY  1980    for broken arm    FRACTURE SURGERY      for broken hand    INCISION AND DRAINAGE ABSCESS  2015    anal    PROSTATE SURGERY      SMALL INTESTINE SURGERY      TONSILLECTOMY           No Known Allergies      No current facility-administered medications on file prior to encounter.     Current Outpatient Medications on File Prior to Encounter   Medication Sig Dispense Refill    amLODIPine (NORVASC) 10 MG tablet Take 1 tablet by mouth Daily. 90 tablet 1    hydrALAZINE (APRESOLINE) 50 MG tablet Take 1 tablet by mouth Every 8 (Eight) Hours. 90 tablet 2    lisinopril (PRINIVIL,ZESTRIL) 40 MG tablet Take 1 tablet by mouth 2 (Two) Times a Day. 180 tablet 0    ondansetron (ZOFRAN) 8 MG tablet Take 1 tablet by mouth Every 8 (Eight) Hours As Needed for Vomiting or Nausea. 30 tablet 0    vilazodone (Viibryd) 40 MG tablet tablet Take 1 tablet by mouth Daily. 90 tablet 1    amitriptyline (ELAVIL) 25 MG tablet Take 1 tablet by mouth Every Night. (Patient taking differently: Take 1 tablet  "by mouth At Night As Needed.) 90 tablet 1    pantoprazole (PROTONIX) 40 MG EC tablet Take 1 tablet by mouth 2 (Two) Times a Day Before Meals. 60 tablet 0    rosuvastatin (Crestor) 10 MG tablet Take 1 tablet by mouth Daily. (Patient taking differently: Take 1 tablet by mouth Every Night.) 90 tablet 1    sucralfate (CARAFATE) 1 g tablet Take 1 tablet by mouth 4 (Four) Times a Day. 20 tablet 0         Social History     Socioeconomic History    Marital status: Single   Tobacco Use    Smoking status: Never    Smokeless tobacco: Never   Vaping Use    Vaping Use: Never used   Substance and Sexual Activity    Alcohol use: Yes     Alcohol/week: 20.0 standard drinks     Types: 10 Cans of beer, 10 Shots of liquor per week     Comment: pt states very rarely    Drug use: Yes     Frequency: 1.0 times per week     Types: Marijuana     Comment: Pt states he may have smoked marijuana 3 weeks ago (stated on 11-07-18)    Sexual activity: Not Currently     Partners: Female         Family History   Problem Relation Age of Onset    Stroke Mother     Stroke Father        REVIEW OF SYSTEMS:   Pertinent positives are noted in the HPI above.  Otherwise, all other systems were reviewed, and are negative.     Objective:     Vitals:    08/17/23 1021 08/17/23 1116 08/17/23 1300 08/17/23 1500   BP: 164/97 165/99 (!) 183/106 (!) 153/102   BP Location:  Right arm     Patient Position:  Lying     Pulse: 65 67     Resp:  18     Temp:  97.5 øF (36.4 øC)     TempSrc:  Oral     SpO2:       Weight: 105 kg (232 lb)      Height: 182.9 cm (72\")        Body mass index is 31.46 kg/mý.  Flowsheet Rows      Flowsheet Row First Filed Value   Admission Height 188 cm (74\") Documented at 08/16/2023 1455   Admission Weight 102 kg (225 lb) Documented at 08/16/2023 1455             General:    No acute distress, alert and oriented x4, pleasant                   Head:    Normocephalic, atraumatic.   Eyes:          Conjunctivae and sclerae normal, no icterus.   Throat: "   No oral lesions, no thrush, oral mucosa moist.    Neck:   Supple, trachea midline.   Lungs:     Clear to auscultation bilaterally     Heart:    Regular rhythm and normal rate.  No murmurs, gallops, or rubs noted.   Abdomen:     Soft, non-tender, non-distended, positive bowel sounds.    Extremities:   No clubbing, cyanosis, or edema.     Pulses:   Pulses palpable and equal bilaterally.    Skin:   No bleeding or rash.   Neuro:   Non-focal.  Moves all extremities well.    Psychiatric:   Normal mood and affect.     Lab Review:                Results from last 7 days   Lab Units 08/17/23  0434   SODIUM mmol/L 141   POTASSIUM mmol/L 3.9   CHLORIDE mmol/L 107   CO2 mmol/L 21.7*   BUN mg/dL 13   CREATININE mg/dL 0.88   GLUCOSE mg/dL 96   CALCIUM mg/dL 8.8     Results from last 7 days   Lab Units 08/16/23  1458   HSTROP T ng/L 6     Results from last 7 days   Lab Units 08/17/23  0434   WBC 10*3/mm3 8.26   HEMOGLOBIN g/dL 14.5   HEMATOCRIT % 44.0   PLATELETS 10*3/mm3 233         Results from last 7 days   Lab Units 08/16/23  1458   CHOLESTEROL mg/dL 194     Results from last 7 days   Lab Units 08/16/23  1458   MAGNESIUM mg/dL 2.4     Results from last 7 days   Lab Units 08/16/23  1458   CHOLESTEROL mg/dL 194   TRIGLYCERIDES mg/dL 194*   HDL CHOL mg/dL 41   LDL CHOL mg/dL 119*       EKG (reviewed by me personally):    EKG 8/14/23    EKG 8/8/22        Assessment:   1.  Hypertensive emergency  2.  Acute CVA with 2 small infarcts in the left cerebellum  3.  Hyperlipidemia  4.  GERD with a history of Lepe's esophagus    Plan:       Again, the patient has 2 small acute infarcts in his cerebellum, which are felt to be ischemic in nature and related to the hypertension per neurology.  The patient has been out of his amlodipine for 1 week, as well as his Viibryd for approximately 3 weeks.  He had a renal artery ultrasound in December 2022 which did not show renal artery stenosis.    I did revise some of his medications.  I  decreased the lisinopril from 40 mg twice a day to once a day.  I added spironolactone 25 mg/day in case there is any component of hyperaldosteronism.  I increased his hydralazine from 50 mg 3 times a day to 100 mg 3 times a day.  I also added amlodipine 5 mg/day.  He will continue on dual antiplatelet therapy for 3 weeks followed by aspirin only afterwards.  He will also continue on high-dose statin therapy with Lipitor 80 mg/day.    A sleep study would be reasonable as an outpatient, although he recently lost his insurance, and we will need to see how this plays out later.  I did review his echocardiogram, and there were no major issues.    Thank you very much for this consult.    Shahab Foster MD

## 2023-08-17 NOTE — PLAN OF CARE
Goal Outcome Evaluation:              Outcome Evaluation: Orders received per stroke protocol. Patient passed RN dysphagia screen and is tolerating regular solid and thin liquid diet, per RN Yamilet. Speech to s/o at this time. Please re-consult if warranted.

## 2023-08-18 ENCOUNTER — APPOINTMENT (OUTPATIENT)
Dept: CT IMAGING | Facility: HOSPITAL | Age: 58
End: 2023-08-18
Payer: COMMERCIAL

## 2023-08-18 ENCOUNTER — APPOINTMENT (OUTPATIENT)
Dept: CARDIOLOGY | Facility: HOSPITAL | Age: 58
End: 2023-08-18
Payer: COMMERCIAL

## 2023-08-18 LAB
ANION GAP SERPL CALCULATED.3IONS-SCNC: 13.5 MMOL/L (ref 5–15)
BUN SERPL-MCNC: 16 MG/DL (ref 6–20)
BUN/CREAT SERPL: 16 (ref 7–25)
CALCIUM SPEC-SCNC: 9.2 MG/DL (ref 8.6–10.5)
CHLORIDE SERPL-SCNC: 106 MMOL/L (ref 98–107)
CO2 SERPL-SCNC: 22.5 MMOL/L (ref 22–29)
CREAT SERPL-MCNC: 1 MG/DL (ref 0.76–1.27)
DEPRECATED RDW RBC AUTO: 45.7 FL (ref 37–54)
EGFRCR SERPLBLD CKD-EPI 2021: 88.3 ML/MIN/1.73
ERYTHROCYTE [DISTWIDTH] IN BLOOD BY AUTOMATED COUNT: 16 % (ref 12.3–15.4)
GLUCOSE SERPL-MCNC: 110 MG/DL (ref 65–99)
HCT VFR BLD AUTO: 43.6 % (ref 37.5–51)
HGB BLD-MCNC: 14.3 G/DL (ref 13–17.7)
MCH RBC QN AUTO: 26.2 PG (ref 26.6–33)
MCHC RBC AUTO-ENTMCNC: 32.8 G/DL (ref 31.5–35.7)
MCV RBC AUTO: 80 FL (ref 79–97)
PLATELET # BLD AUTO: 221 10*3/MM3 (ref 140–450)
PMV BLD AUTO: 10.4 FL (ref 6–12)
POTASSIUM SERPL-SCNC: 3.8 MMOL/L (ref 3.5–5.2)
RBC # BLD AUTO: 5.45 10*6/MM3 (ref 4.14–5.8)
SODIUM SERPL-SCNC: 142 MMOL/L (ref 136–145)
T4 FREE SERPL-MCNC: 1.39 NG/DL (ref 0.93–1.7)
TSH SERPL DL<=0.05 MIU/L-ACNC: 0.29 UIU/ML (ref 0.27–4.2)
VIT B12 BLD-MCNC: 500 PG/ML (ref 211–946)
WBC NRBC COR # BLD: 6.95 10*3/MM3 (ref 3.4–10.8)

## 2023-08-18 PROCEDURE — 85027 COMPLETE CBC AUTOMATED: CPT | Performed by: NURSE PRACTITIONER

## 2023-08-18 PROCEDURE — 82607 VITAMIN B-12: CPT

## 2023-08-18 PROCEDURE — 93246 EXT ECG>7D<15D RECORDING: CPT

## 2023-08-18 PROCEDURE — 84439 ASSAY OF FREE THYROXINE: CPT | Performed by: INTERNAL MEDICINE

## 2023-08-18 PROCEDURE — 99232 SBSQ HOSP IP/OBS MODERATE 35: CPT | Performed by: INTERNAL MEDICINE

## 2023-08-18 PROCEDURE — 99215 OFFICE O/P EST HI 40 MIN: CPT

## 2023-08-18 PROCEDURE — 70450 CT HEAD/BRAIN W/O DYE: CPT

## 2023-08-18 PROCEDURE — G0378 HOSPITAL OBSERVATION PER HR: HCPCS

## 2023-08-18 PROCEDURE — 84443 ASSAY THYROID STIM HORMONE: CPT | Performed by: INTERNAL MEDICINE

## 2023-08-18 PROCEDURE — 80048 BASIC METABOLIC PNL TOTAL CA: CPT | Performed by: NURSE PRACTITIONER

## 2023-08-18 RX ORDER — AMLODIPINE BESYLATE 5 MG/1
5 TABLET ORAL DAILY
Qty: 90 TABLET | Refills: 0 | Status: ON HOLD | OUTPATIENT
Start: 2023-08-19 | End: 2023-09-02

## 2023-08-18 RX ORDER — ASPIRIN 81 MG/1
81 TABLET ORAL DAILY
Qty: 90 TABLET | Refills: 0 | Status: SHIPPED | OUTPATIENT
Start: 2023-08-18

## 2023-08-18 RX ORDER — ASPIRIN 81 MG/1
81 TABLET ORAL DAILY
Status: DISCONTINUED | OUTPATIENT
Start: 2023-08-18 | End: 2023-08-19 | Stop reason: HOSPADM

## 2023-08-18 RX ORDER — CARVEDILOL 6.25 MG/1
6.25 TABLET ORAL 2 TIMES DAILY WITH MEALS
Status: DISCONTINUED | OUTPATIENT
Start: 2023-08-18 | End: 2023-08-19 | Stop reason: HOSPADM

## 2023-08-18 RX ORDER — CLOPIDOGREL BISULFATE 75 MG/1
75 TABLET ORAL DAILY
Qty: 21 TABLET | Refills: 0 | Status: SHIPPED | OUTPATIENT
Start: 2023-08-19 | End: 2023-09-09

## 2023-08-18 RX ORDER — AMITRIPTYLINE HYDROCHLORIDE 25 MG/1
25 TABLET, FILM COATED ORAL NIGHTLY PRN
Status: ON HOLD
Start: 2023-08-18 | End: 2023-09-02

## 2023-08-18 RX ORDER — SPIRONOLACTONE 25 MG/1
25 TABLET ORAL DAILY
Qty: 30 TABLET | Refills: 0 | Status: SHIPPED | OUTPATIENT
Start: 2023-08-19

## 2023-08-18 RX ORDER — HYDRALAZINE HYDROCHLORIDE 100 MG/1
100 TABLET, FILM COATED ORAL EVERY 8 HOURS SCHEDULED
Qty: 90 TABLET | Refills: 0 | Status: SHIPPED | OUTPATIENT
Start: 2023-08-18

## 2023-08-18 RX ORDER — ATORVASTATIN CALCIUM 80 MG/1
80 TABLET, FILM COATED ORAL NIGHTLY
Qty: 90 TABLET | Refills: 0 | Status: SHIPPED | OUTPATIENT
Start: 2023-08-18 | End: 2023-08-24 | Stop reason: HOSPADM

## 2023-08-18 RX ORDER — LISINOPRIL 40 MG/1
40 TABLET ORAL DAILY
Qty: 30 TABLET | Refills: 0 | Status: SHIPPED | OUTPATIENT
Start: 2023-08-18

## 2023-08-18 RX ORDER — HYDROXYZINE HYDROCHLORIDE 25 MG/1
25 TABLET, FILM COATED ORAL 3 TIMES DAILY PRN
Status: DISCONTINUED | OUTPATIENT
Start: 2023-08-18 | End: 2023-08-19 | Stop reason: HOSPADM

## 2023-08-18 RX ADMIN — ATORVASTATIN CALCIUM 80 MG: 80 TABLET, FILM COATED ORAL at 20:55

## 2023-08-18 RX ADMIN — ASPIRIN 81 MG: 81 TABLET, COATED ORAL at 14:18

## 2023-08-18 RX ADMIN — HYDRALAZINE HYDROCHLORIDE 100 MG: 50 TABLET, FILM COATED ORAL at 05:29

## 2023-08-18 RX ADMIN — Medication 10 ML: at 20:55

## 2023-08-18 RX ADMIN — AMLODIPINE BESYLATE 5 MG: 5 TABLET ORAL at 08:25

## 2023-08-18 RX ADMIN — VILAZODONE HYDROCHLORIDE 40 MG: 40 TABLET, FILM COATED ORAL at 08:26

## 2023-08-18 RX ADMIN — Medication 10 ML: at 08:26

## 2023-08-18 RX ADMIN — HYDRALAZINE HYDROCHLORIDE 100 MG: 50 TABLET, FILM COATED ORAL at 21:24

## 2023-08-18 RX ADMIN — Medication 10 ML: at 08:27

## 2023-08-18 RX ADMIN — SPIRONOLACTONE 25 MG: 25 TABLET ORAL at 08:25

## 2023-08-18 RX ADMIN — CLOPIDOGREL BISULFATE 75 MG: 75 TABLET, FILM COATED ORAL at 08:25

## 2023-08-18 RX ADMIN — LISINOPRIL 40 MG: 20 TABLET ORAL at 08:26

## 2023-08-18 RX ADMIN — HYDRALAZINE HYDROCHLORIDE 100 MG: 50 TABLET, FILM COATED ORAL at 14:18

## 2023-08-18 RX ADMIN — SODIUM CHLORIDE 250 ML: 9 INJECTION, SOLUTION INTRAVENOUS at 14:20

## 2023-08-18 RX ADMIN — HYDROXYZINE HYDROCHLORIDE 25 MG: 25 TABLET ORAL at 08:48

## 2023-08-18 RX ADMIN — CARVEDILOL 6.25 MG: 6.25 TABLET, FILM COATED ORAL at 21:24

## 2023-08-18 RX ADMIN — HYDROXYZINE HYDROCHLORIDE 25 MG: 25 TABLET ORAL at 17:02

## 2023-08-18 NOTE — PROGRESS NOTES
Discharge Planning Assessment  [facility]     Patient Name: Flako Coreas  MRN: [id number]  Today's Date: 8/18/2023    Admit Date: 8/16/2023    Plan: Home at DC, will need transportation assistance and medication cost assist at DC..........Shanique SIDHU   Discharge Needs Assessment       Row Name 08/18/23 1047       Living Environment    People in Home alone    Current Living Arrangements apartment    Potentially Unsafe Housing Conditions none    Primary Care Provided by self    Provides Primary Care For no one    Family Caregiver if Needed other (see comments)                   Discharge Plan       Row Name 08/18/23 1050       Plan    Plan Home at DC, will need transportation assistance and medication cost assist at DC..........Shanique SIDHU    Patient/Family in Agreement with Plan yes    Plan Comments S/W Patient at bedside, introduced self and role. Pt. lives alone in a second floor apartment with flight of steps to enter, denies any issues navigating steps. Pt. IADL's prior to admit, PCP Dr. Rodriguez and [address] is outside pharmacy, wishes to utilize meds to beds services at discharge. Pt. states he lost his job 3 weeks ago and his car, states he has an aunt who can assist financially if he needs it. Pt. emergency contact son Flako, updated contact number: [phone]. Pt. states he will need medication cost assist at DC, pt. will also need transportation home at DC, aware CCP will follow and assist with these needs. Pt. provided with resources from [employer] [employer] as well as the [employer] website/mikhail for assistance with community needs after DC. Pt. denies further needs/concerns at this time.........Shanique SIDHU                  Continued Care and Services - Admitted Since 8/16/2023    Coordination has not been started for this encounter.          Demographic Summary    No documentation.                  Functional Status       Row Name 08/18/23 1037       Functional Status    Usual Activity  Tolerance good    Current Activity Tolerance good       Functional Status, IADL    Medications independent    Meal Preparation independent    Housekeeping independent    Laundry independent    Shopping independent       Mental Status    General Appearance WDL WDL       Mental Status Summary    Recent Changes in Mental Status/Cognitive Functioning no changes       Employment/    Employment Status unemployed                   Psychosocial       Row Name 08/18/23 1046       Coping/Stress    Major Change/Loss/Stressor job change/loss    Sources of Support other family members                   Abuse/Neglect    No documentation.                  Legal    No documentation.                  Substance Abuse    No documentation.                  Patient Forms    No documentation.                     Yamilet Srinivasan RN

## 2023-08-18 NOTE — NURSING NOTE
Patient's discharge was held related to extreme dizziness with  position change to sitting and  to standing, as well as, patient reported not feeling ok. Repeat CT [id number] bolus given and Neuro team reassessed patient. He rested this afternoon and is experiencing anxiety at times which lessen post atarax.

## 2023-08-18 NOTE — CONSULTS
"Nutrition Services    Patient Name:  Flako Coreas  YOB: 1965  MRN: [id number]  Admit Date:  8/16/2023    Assessment Date:  08/18/23    NUTRITION SCREENING      Reason for Encounter Nurse admission screen consult    Diagnosis/Problem Acute CVA    Patient reports he is eating well. He passed swallow eval. No wt loss noted. Reports no nutrition issues/needs. D/c summary in        PO Diet Diet: Diabetic Diets; Consistent Carbohydrate; Texture: Regular Texture (IDDSI 7); Fluid Consistency: Thin (IDDSI 0)   PO Supplements/Snacks    PO Intake % Insufficient data        Labs  Listed below, reviewed   Physical Findings Alert, oriented, obese   GI Function WDL    Skin Status Skin intact        Height:  Weight:  BMI:   Category  Weight Trend Height: 182.9 cm (72\")  Weight: 107 kg (236 lb 1.8 oz) (08/17/23 1933)  Body mass index is 32.02 kg/mý.  Obese, Class I (30 - 34.9)  Stable       Intervention/Plan Follow PRN          Results from last 7 days   Lab Units 08/18/23  0531 08/17/23  0434 08/16/23  1458   SODIUM mmol/L 142 141 139   POTASSIUM mmol/L 3.8 3.9 3.8   CHLORIDE mmol/L 106 107 104   CO2 mmol/L 22.5 21.7* 19.9*   BUN mg/dL 16 13 15   CREATININE mg/dL 1.00 0.88 1.09   CALCIUM mg/dL 9.2 8.8 9.9   BILIRUBIN mg/dL  --   --  0.2   ALK PHOS U/L  --   --  74   ALT (SGPT) U/L  --   --  23   AST (SGOT) U/L  --   --  13   GLUCOSE mg/dL 110* 96 124*     Results from last 7 days   Lab Units 08/18/23  0531 08/17/23  0434 08/16/23  1458   MAGNESIUM mg/dL  --   --  2.4   HEMOGLOBIN g/dL 14.3   < > 15.2   HEMATOCRIT % 43.6   < > 46.6   TRIGLYCERIDES mg/dL  --   --  194*    < > = values in this interval not displayed.     COVID19   Date Value Ref Range Status   12/08/2022 Not Detected Not Detected - Ref. Range Final     Lab Results   Component Value Date    HGBA1C 5.20 08/16/2023       RD to follow up per protocol.    Electronically signed by:  Michelle Marsh RD  08/18/23 13:26 EDT  "

## 2023-08-18 NOTE — PLAN OF CARE
Problem: Adult Inpatient Plan of Care  Goal: Plan of Care Review  Outcome: Ongoing, Progressing  Goal: Patient-Specific Goal (Individualized)  Outcome: Ongoing, Progressing  Goal: Absence of Hospital-Acquired Illness or Injury  Outcome: Ongoing, Progressing  Intervention: Identify and Manage Fall Risk  Recent Flowsheet Documentation  Taken 8/18/2023 0000 by Tessa Jay RN  Safety Promotion/Fall Prevention:   safety round/check completed   room organization consistent   nonskid shoes/slippers when out of bed   lighting adjusted   fall prevention program maintained   activity supervised  Taken 8/17/2023 2003 by Tessa Jay RN  Safety Promotion/Fall Prevention:   safety round/check completed   room organization consistent   nonskid shoes/slippers when out of bed   lighting adjusted   fall prevention program maintained   activity supervised  Intervention: Prevent Skin Injury  Recent Flowsheet Documentation  Taken 8/18/2023 0000 by Tessa Jay RN  Body Position: position changed independently  Taken 8/17/2023 2003 by Tessa Jay RN  Body Position: position changed independently  Intervention: Prevent and Manage VTE (Venous Thromboembolism) Risk  Recent Flowsheet Documentation  Taken 8/18/2023 0000 by Tessa Jay RN  Activity Management: activity encouraged  Taken 8/17/2023 2003 by Tessa Jay RN  Activity Management: activity encouraged  Intervention: Prevent Infection  Recent Flowsheet Documentation  Taken 8/18/2023 0000 by Tessa Jay RN  Infection Prevention:   hand hygiene promoted   rest/sleep promoted   single patient room provided  Taken 8/17/2023 2003 by Tessa Jay RN  Infection Prevention:   hand hygiene promoted   rest/sleep promoted   single patient room provided  Goal: Optimal Comfort and Wellbeing  Outcome: Ongoing, Progressing  Intervention: Provide Person-Centered Care  Recent Flowsheet Documentation  Taken 8/17/2023 1933 by Tessa Jay, THEA  Trust  Relationship/Rapport:   care explained   choices provided   emotional support provided   empathic listening provided   questions answered   questions encouraged   reassurance provided   thoughts/feelings acknowledged  Goal: Readiness for Transition of Care  Outcome: Ongoing, Progressing  Intervention: Mutually Develop Transition Plan  Recent Flowsheet Documentation  Taken 8/17/2023 1952 by Tessa Jay, RN  Equipment Currently Used at Home: none     Problem: Fall Injury Risk  Goal: Absence of Fall and Fall-Related Injury  Outcome: Ongoing, Progressing  Intervention: Identify and Manage Contributors  Recent Flowsheet Documentation  Taken 8/17/2023 2003 by Tessa Jay, RN  Medication Review/Management: medications reviewed  Intervention: Promote Injury-Free Environment  Recent Flowsheet Documentation  Taken 8/18/2023 0000 by Tessa Jay, RN  Safety Promotion/Fall Prevention:   safety round/check completed   room organization consistent   nonskid shoes/slippers when out of bed   lighting adjusted   fall prevention program maintained   activity supervised  Taken 8/17/2023 2003 by Tessa Jay, RN  Safety Promotion/Fall Prevention:   safety round/check completed   room organization consistent   nonskid shoes/slippers when out of bed   lighting adjusted   fall prevention program maintained   activity supervised  Goal: Absence of Fall and Fall-Related Injury  Outcome: Ongoing, Progressing  Intervention: Promote Injury-Free Environment  Recent Flowsheet Documentation  Taken 8/18/2023 0000 by Tessa Jay, RN  Safety Promotion/Fall Prevention:   safety round/check completed   room organization consistent   nonskid shoes/slippers when out of bed   lighting adjusted   fall prevention program maintained   activity supervised  Taken 8/17/2023 2003 by Tessa Jay, RN  Safety Promotion/Fall Prevention:   safety round/check completed   room organization consistent   nonskid shoes/slippers when out of bed   lighting  adjusted   fall prevention program maintained   activity supervised     Problem: Hypertension Comorbidity  Goal: Blood Pressure in Desired Range  Outcome: Ongoing, Progressing  Intervention: Maintain Blood Pressure Management  Recent Flowsheet Documentation  Taken 8/17/2023 2003 by Tessa Jay, RN  Medication Review/Management: medications reviewed     Problem: Adjustment to Illness (Stroke, Ischemic/Transient Ischemic Attack)  Goal: Optimal Coping  Outcome: Ongoing, Progressing     Problem: Bowel Elimination Impaired (Stroke, Ischemic/Transient Ischemic Attack)  Goal: Effective Bowel Elimination  Outcome: Ongoing, Progressing     Problem: Cerebral Tissue Perfusion (Stroke, Ischemic/Transient Ischemic Attack)  Goal: Optimal Cerebral Tissue Perfusion  Outcome: Ongoing, Progressing     Problem: Functional Ability Impaired (Stroke, Ischemic/Transient Ischemic Attack)  Goal: Optimal Functional Ability  Outcome: Ongoing, Progressing  Intervention: Optimize Functional Ability  Recent Flowsheet Documentation  Taken 8/18/2023 0000 by Tessa Jay, RN  Activity Management: activity encouraged  Taken 8/17/2023 2003 by Tessa Jay, RN  Activity Management: activity encouraged   Goal Outcome Evaluation:

## 2023-08-18 NOTE — NURSING NOTE
When patient arrived to the floor I reviewed POC with patient anisha meds, scheduled and PRN, diet, tests, I&Os, fall precautions, telemetry, all questions were answered. Patient did not have any events over night. VS stable, tele was SR. Patient had a BM today. Will continue to monitor patient, vs, and tele till day shift nursing change.

## 2023-08-18 NOTE — PROGRESS NOTES
"DOS: 2023  NAME: Flako Coreas   : 1965  PCP: Akash Rodriguez Jr.,   Chief Complaint   Patient presents with    Dizziness     Stroke    Subjective:     Interval History  Pt seen in follow up today, however the problem is new to the examiner.  Patient Complaints:  No acute events overnight.  Patient is visibly anxious sitting in bed, cannot sit still.  He states that he has been hypertensive since he was in his 20s and feels that the goal of 130/80 will be impossible for him to achieve.  He has concerns about being out of work and follow-up care.  No family at bedside.  History taken from: patient chart    Objective:  Vital signs: BP (!) 144/106 (BP Location: Right arm, Patient Position: Lying) Comment: nurse notified  Pulse 71   Temp 97.9 øF (36.6 øC) (Oral)   Resp 18   Ht 182.9 cm (72\")   Wt 107 kg (236 lb 1.8 oz)   SpO2 98%   BMI 32.02 kg/mý       Physical Exam:  GENERAL: NAD, alert and cooperative  HEENT: Normocephalic, atraumatic   Resp: Even and unlabored  Skin: Warm and dry, no rashes or birth marks.   Psychiatric: Normal mood and affect.    Neurological:   MS: AOx3, recent/remote memory intact, normal attention/concentration, language intact, no neglect   CN: visual acuity grossly normal, PERRL, EOMI, no nystagmus, no facial droop, no dysarthria, hearing symmetric, tongue midline  Motor: 5/5 strength in all 4 ext. Normal tone.   Sensory: Intact to light touch, cold temperature, and vibration in all four ext.  Coordination: No dysmetria finger to nose test, normal heel to shin test   Rapid alternating movements: Normal finger to thumb tap  Gait and station: Somewhat ataxic gait  Reflexes: 2+ patellar, achilles. Down-going toes    Results Review: I have reviewed MRI brain and see two acute infarcts of the left cerebellum      I reviewed the patient's new clinical results.  I reviewed the patient's new imaging results and agree with the interpretation.  I reviewed the patient's " other test results and agree with the interpretation  I personally viewed and interpreted the patient's EKG/Telemetry data  Discussed with Dr. Kraus    Current Medications:  Scheduled Medications:amLODIPine, 5 mg, Oral, Daily  atorvastatin, 80 mg, Oral, Nightly  clopidogrel, 75 mg, Oral, Daily  hydrALAZINE, 100 mg, Oral, Q8H  lisinopril, 40 mg, Oral, Q24H  sodium chloride, 10 mL, Intravenous, Q12H  sodium chloride, 10 mL, Intravenous, Q12H  spironolactone, 25 mg, Oral, Daily  vilazodone, 40 mg, Oral, Daily      Infusions:    PRN Medications:    acetaminophen    amitriptyline    hydrOXYzine    melatonin    nitroglycerin    ondansetron **OR** ondansetron    sodium chloride    [COMPLETED] Insert Peripheral IV **AND** sodium chloride    sodium chloride    sodium chloride    sodium chloride    sodium chloride    Laboratory results:  Lab Results   Component Value Date    GLUCOSE 110 (H) 08/18/2023    CALCIUM 9.2 08/18/2023     08/18/2023    K 3.8 08/18/2023    CO2 22.5 08/18/2023     08/18/2023    BUN 16 08/18/2023    CREATININE 1.00 08/18/2023    EGFRIFAFRI >60 09/28/2018    EGFRIFNONA 98 11/05/2018    BCR 16.0 08/18/2023    ANIONGAP 13.5 08/18/2023     Lab Results   Component Value Date    WBC 6.95 08/18/2023    HGB 14.3 08/18/2023    HCT 43.6 08/18/2023    MCV 80.0 08/18/2023     08/18/2023      Results from last 7 days   Lab Units 08/16/23  1458   CHOLESTEROL mg/dL 194     Lab Results   Component Value Date    INR 0.94 12/11/2022    INR 1.0 09/18/2018    INR 1.0 08/10/2018    PROTIME 12.7 12/11/2022    PROTIME 11.6 09/18/2018    PROTIME 10.4 08/10/2018     Lab Results   Component Value Date    TSH 0.288 08/18/2023     No components found for: LDLCALC  Lab Results   Component Value Date    HGBA1C 5.20 08/16/2023     No components found for: B12    Review and interpretation of imaging:     Adult Transthoracic Echo Complete W/ Cont if Necessary Per Protocol (With Agitated Saline)    Result Date:  8/17/2023    Left ventricular systolic function is normal. Left ventricular ejection fraction appears to be 66 - 70%.   Left ventricular wall thickness is consistent with mild concentric hypertrophy.   Left ventricular diastolic function is consistent with (grade I) impaired relaxation.   Normal right ventricular cavity size and systolic function noted.   The left atrial cavity is mildly dilated.   Saline test results are negative.   There is no evidence of pericardial effusion.     CT Angiogram Neck    Result Date: 8/16/2023  CT ANGIOGRAPHY OF THE HEAD AND NECK WITHOUT AND WITH INTRAVENOUS CONTRAST AND 3D RECONSTRUCTIONS  HISTORY: Dizziness and unsteady gait. Difficulty finding words.  The CT scan was performed as an emergency procedure with CT angiography protocol through the head and neck without and with intravenous contrast and 3D reconstructions. The following findings are present: 1. Initial noncontrast CT scan of the brain was performed. The ventricles are normal in size and midline. There is no evidence of intracranial hemorrhage or mass effect. The sinuses and mastoid air cells are clear.  2. Evaluation for stenosis is based on NASCET criteria. The vertebral arteries are patent with dominance of the right vertebral artery. The left vertebral artery becomes extremely small in caliber distal to the left PICA.  3. There is calcification involving the left and right carotid bifurcations. There is no NASCET significant stenosis of the cervical carotid arteries.  4. The intracranial CT angiography shows no stenosis of the major vessels. The right posterior communicating artery is patent. No aneurysm is seen.    Radiation dose reduction techniques were utilized, including automated exposure control and exposure modulation based on body size.   This report was finalized on 8/16/2023 7:30 PM by Dr. Reagan Mansfield M.D.      MRI Brain Without Contrast    Result Date: 8/16/2023  MR SCAN OF THE BRAIN WITHOUT CONTRAST   HISTORY: Dizziness. Ataxia.  The MR scan was performed as an emergency procedure with sagittal and axial images without contrast. There is mild diffuse atrophy and chronic small vessel ischemic change. There is no evidence of acute intracranial hemorrhage or mass effect. The diffusion sequence demonstrates 2 small areas of acute infarction in the inferior aspect of the left cerebellum and these each measure up to 7 mm in size.  There are normal flow voids in the major vessels. There is slight mucosal thickening involving several left ethmoid air cells. The mastoid air cells are clear.  CONCLUSION: 2 small acute infarcts in inferior aspect of the left cerebellum. No evidence of hemorrhage or mass effect.  This report was finalized on 8/16/2023 8:05 PM by Dr. Reagan Mansfield M.D.      XR Chest 1 View    Result Date: 8/16/2023  XR CHEST 1 VW-  HISTORY: Dizziness, shortness of air.  COMPARISON: Chest radiograph 12/2/2022  FINDINGS:  2 views of the chest were obtained.  Support Devices:  None. Cardiac Silhouette/Mediastinum/Ellyn: The cardiac silhouette is normal in size. The descending aorta is slightly tortuous, similar to prior. Lungs/Pleural Spaces:  The lungs and pleural spaces are clear. Chest Wall/Diaphragm/Upper Abdomen: There is multilevel degenerative disc disease.   CONCLUSION(S):   1.  No focal consolidation or effusion.  This report was finalized on 8/16/2023 4:18 PM by Dr. Shannan Stevenson M.D.      CT Angiogram Head    Result Date: 8/16/2023  CT ANGIOGRAPHY OF THE HEAD AND NECK WITHOUT AND WITH INTRAVENOUS CONTRAST AND 3D RECONSTRUCTIONS  HISTORY: Dizziness and unsteady gait. Difficulty finding words.  The CT scan was performed as an emergency procedure with CT angiography protocol through the head and neck without and with intravenous contrast and 3D reconstructions. The following findings are present: 1. Initial noncontrast CT scan of the brain was performed. The ventricles are normal in size and midline.  There is no evidence of intracranial hemorrhage or mass effect. The sinuses and mastoid air cells are clear.  2. Evaluation for stenosis is based on NASCET criteria. The vertebral arteries are patent with dominance of the right vertebral artery. The left vertebral artery becomes extremely small in caliber distal to the left PICA.  3. There is calcification involving the left and right carotid bifurcations. There is no NASCET significant stenosis of the cervical carotid arteries.  4. The intracranial CT angiography shows no stenosis of the major vessels. The right posterior communicating artery is patent. No aneurysm is seen.    Radiation dose reduction techniques were utilized, including automated exposure control and exposure modulation based on body size.   This report was finalized on 8/16/2023 7:30 PM by Dr. Reagan Mansfield M.D.       Work-up to date:  ECG 8/16: SR, HR 90  MRI brain wo 8/16: Diffusion sequence demonstrates 2 small areas of acute infarction of the left cerebellum.  Mild diffuse atrophy and chronic small vessel ischemic change present.  CTA head/neck 8/16: Left vertebral artery becomes extremely small in caliber distal to the left PICA.  Calcification of bilateral carotids however no significant stenosis.  No stenosis of the major vessels.  No aneurysm seen.  TTE 8/17: EF 66-70%, all left ventricular wall segments contract normally.  Left atrial cavity is mildly dilated. Saline test results negative.  No evidence of left ventricular thrombus.    Lab Review: A1C 5.2%, , triglycerides 194, TSH 0.288, free T4 1.39, B12 500,      Impression: Mr. Coreas is a 58 year old male with a past medical history of HTN, mixed hyperlipidemia, mixed anxiety depressive disorder, GERD, no CAD or stroke/TIA history, non-smoker.  I originally presented to [facility] 8/16/2023 with complaints of dizziness and an unsteady gait x3 days that was associated with nausea. BP in the ED was 220/141.  He apparently  was out of his home amlodipine.  He reports that his blood pressure generally is elevated at home, and tells me that he stopped taking his statin medication a few weeks ago. Cardiology was consulted here and is managing antihypertensives. A MRI brain did reveal 2 small acute left cerebellar strokes.  CTA head and neck showed no stenosis of the major vessels of the brain or the carotids however the left vertebral artery becomes extremely small in caliber distal to the left PICA.  I discussed CTA results with Dr. Kraus and Dr. Mansfield in Neuroradiology, who feels there is no evidence of dissection. On exam, patient appears anxious, almost panicked. He says his stroke etiology wasn't explained to him and asks if he is able to walk and participate in high-altitude activities. Spent some time educating patient on his strokes, causes and methods of secondary prevention. Patient strokes are likely related to small vessel ischemic disease. His BP has remained elevated this admission which is likely contributory. He was started on DAPT for 21 days to be followed by aspirin monotherapy.  He will need a Holter monitor at discharge and will need to monitor blood pressure at home as well as be more compliant with his home medications.  No further neuro work-up planned, we will sign off and see again upon request.  Call RRT for any acute neurologic change or concern.    Plan:  Continue Aspirin 81mg, Plavix 75mg daily for 21 days, then aspirin monotherapy  Continue high-intensity statin. , goal <70  Recommend tight BP control, goal <130/80. Monitor at home. Hydrate and avoid hypotension.  Recommend outpatient sleep study to evaluate for sleep apnea   Holter monitor at discharge  Medication compliance  Neuro checks  Stroke education  TEDs/SCDs  Therapies as written  CCP for discharge planning    Follow-up in three months with stroke neurology outpatient. This will be arranged for the patient.     I have discussed the above  "with the patient, THEA Topete, Dr. rKaus, Dr. Marsh who are in agreement with the plan.     Rhonda Bustamante, APRN  08/18/23  08:38 EDT    Addendum 1519: STAT head CT performed d/t patient's c/o dizziness \"so severe he couldn't walk\". Preliminary CTH wo was reviewed by myself and Dr. Kraus and shows no acute changes. Will await final read. I re-performed neuro exam and there are no changes. Performed HINTS exam which was negative. Patient agitated, keeps repeating \"Just send me home. I won't be interrogated\". Patient is impulsive with walking, walks quickly. Re-iterated PT recommendations to change position and walk slowly. BP on this evaluation 177/105, HR 89.     Diagnoses:    Dizziness    Acute CVA (cerebrovascular accident)    "

## 2023-08-18 NOTE — PROGRESS NOTES
LOS: 0 days   Patient Care Team:  Akash Rodriguez Jr., DO as PCP - General (Sports Medicine)    Chief Complaint: Follow-up hypertensive emergency, acute CVA.    Interval History: He is very anxious today.  He states that he has had issues with anxiety in the past.  No chest pain or shortness of breath.  His blood pressure has still been quite elevated.    Vital Signs:  Temp:  [97.8 øF (36.6 øC)-98.2 øF (36.8 øC)] 98.2 øF (36.8 øC)  Heart Rate:  [56-89] 89  Resp:  [16-18] 16  BP: (143-177)/() 177/105    Intake/Output Summary (Last 24 hours) at 8/18/2023 1714  Last data filed at 8/18/2023 1225  Gross per 24 hour   Intake 480 ml   Output 150 ml   Net 330 ml       Physical Exam:   General Appearance:    No acute distress, alert and oriented x4   Lungs:     Clear to auscultation bilaterally     Heart:    Regular rhythm and normal rate.  No murmurs, gallops, or    rubs.   Abdomen:     Soft, nontender, nondistended.    Extremities:   No clubbing, cyanosis, or edema.     Results Review:    Results from last 7 days   Lab Units 08/18/23  0531   SODIUM mmol/L 142   POTASSIUM mmol/L 3.8   CHLORIDE mmol/L 106   CO2 mmol/L 22.5   BUN mg/dL 16   CREATININE mg/dL 1.00   GLUCOSE mg/dL 110*   CALCIUM mg/dL 9.2     Results from last 7 days   Lab Units 08/16/23  1458   HSTROP T ng/L 6     Results from last 7 days   Lab Units 08/18/23  0531   WBC 10*3/mm3 6.95   HEMOGLOBIN g/dL 14.3   HEMATOCRIT % 43.6   PLATELETS 10*3/mm3 221         Results from last 7 days   Lab Units 08/16/23  1458   CHOLESTEROL mg/dL 194     Results from last 7 days   Lab Units 08/16/23  1458   MAGNESIUM mg/dL 2.4     Results from last 7 days   Lab Units 08/16/23  1458   CHOLESTEROL mg/dL 194   TRIGLYCERIDES mg/dL 194*   HDL CHOL mg/dL 41   LDL CHOL mg/dL 119*       I reviewed the patient's new clinical results.        Assessment:  1.  Hypertensive emergency  2.  Acute CVA with 2 small infarcts in the left cerebellum  3.  Hyperlipidemia  4.  GERD  with a history of Lepe's esophagus  5.  Anxiety    Plan:  -He was quite anxious in the room.  I think some of this may be driving his blood pressure higher.  I also feel that the blood pressure in general probably makes him more anxious also, and I told him that they go hand-in-hand.    -His blood pressure is slightly better, although significantly high at times still.  I have him on hydralazine 100 mg every 8 hours (increased from 50 mg every 8 hours).  I decrease the lisinopril to 40 mg/day from 40 mg twice daily.  I added spironolactone 25 mg/day, and resumed his amlodipine at 5 mg/day.  He probably needs several days on this regimen to see how it is actually going to work.    -Continue aspirin, Plavix, and Lipitor 80 mg qhs.      Carlos Foster MD  08/18/23  17:14 EDT

## 2023-08-18 NOTE — PLAN OF CARE
Goal Outcome Evaluation:       Patient is being discharged with holter monitor.   Problem: Adult Inpatient Plan of Care  Goal: Plan of Care Review  Outcome: Adequate for Care Transition  Goal: Patient-Specific Goal (Individualized)  Outcome: Adequate for Care Transition  Goal: Absence of Hospital-Acquired Illness or Injury  Outcome: Adequate for Care Transition  Intervention: Identify and Manage Fall Risk  Recent Flowsheet Documentation  Taken 8/18/2023 1145 by Yoselyn Gongora RN  Safety Promotion/Fall Prevention:   toileting scheduled   safety round/check completed   room organization consistent   nonskid shoes/slippers when out of bed   lighting adjusted   fall prevention program maintained  Taken 8/18/2023 0832 by Yoselyn Gongora RN  Safety Promotion/Fall Prevention:   activity supervised   toileting scheduled   safety round/check completed   room organization consistent   nonskid shoes/slippers when out of bed   lighting adjusted   fall prevention program maintained  Intervention: Prevent Skin Injury  Recent Flowsheet Documentation  Taken 8/18/2023 1145 by Yoselyn Gongora RN  Body Position: position changed independently  Intervention: Prevent and Manage VTE (Venous Thromboembolism) Risk  Recent Flowsheet Documentation  Taken 8/18/2023 1145 by Yoselyn Gongora RN  Activity Management: activity encouraged  Taken 8/18/2023 0832 by Yoselyn Gongora RN  Activity Management: activity encouraged  Intervention: Prevent Infection  Recent Flowsheet Documentation  Taken 8/18/2023 1145 by Yoselyn Gongora RN  Infection Prevention:   environmental surveillance performed   hand hygiene promoted   single patient room provided  Taken 8/18/2023 0832 by Yoselyn Gongora RN  Infection Prevention:   environmental surveillance performed   hand hygiene promoted   single patient room provided  Goal: Optimal Comfort and Wellbeing  Outcome: Adequate for Care Transition  Intervention: Provide Person-Centered Care  Recent Flowsheet Documentation  Taken 8/18/2023  0832 by Yoselyn Gongora RN  Trust Relationship/Rapport:   care explained   choices provided   reassurance provided   thoughts/feelings acknowledged  Goal: Readiness for Transition of Care  Outcome: Adequate for Care Transition     Problem: Fall Injury Risk  Goal: Absence of Fall and Fall-Related Injury  Outcome: Adequate for Care Transition  Intervention: Identify and Manage Contributors  Recent Flowsheet Documentation  Taken 8/18/2023 1145 by Yoselyn Gongora RN  Medication Review/Management: medications reviewed  Taken 8/18/2023 0832 by Yoselyn Gongora RN  Medication Review/Management: medications reviewed  Intervention: Promote Injury-Free Environment  Recent Flowsheet Documentation  Taken 8/18/2023 1145 by Yoselyn Gongora RN  Safety Promotion/Fall Prevention:   toileting scheduled   safety round/check completed   room organization consistent   nonskid shoes/slippers when out of bed   lighting adjusted   fall prevention program maintained  Taken 8/18/2023 0832 by Yoselyn Gongora RN  Safety Promotion/Fall Prevention:   activity supervised   toileting scheduled   safety round/check completed   room organization consistent   nonskid shoes/slippers when out of bed   lighting adjusted   fall prevention program maintained  Goal: Absence of Fall and Fall-Related Injury  Outcome: Adequate for Care Transition  Intervention: Promote Injury-Free Environment  Recent Flowsheet Documentation  Taken 8/18/2023 1145 by Yoselyn Gongora RN  Safety Promotion/Fall Prevention:   toileting scheduled   safety round/check completed   room organization consistent   nonskid shoes/slippers when out of bed   lighting adjusted   fall prevention program maintained  Taken 8/18/2023 0832 by Yoselyn Gongora RN  Safety Promotion/Fall Prevention:   activity supervised   toileting scheduled   safety round/check completed   room organization consistent   nonskid shoes/slippers when out of bed   lighting adjusted   fall prevention program maintained     Problem:  Hypertension Comorbidity  Goal: Blood Pressure in Desired Range  Outcome: Adequate for Care Transition  Intervention: Maintain Blood Pressure Management  Recent Flowsheet Documentation  Taken 8/18/2023 1145 by Yoselyn Gongora RN  Medication Review/Management: medications reviewed  Taken 8/18/2023 0832 by Yoselyn Gongora RN  Medication Review/Management: medications reviewed     Problem: Adjustment to Illness (Stroke, Ischemic/Transient Ischemic Attack)  Goal: Optimal Coping  Outcome: Adequate for Care Transition  Intervention: Support Psychosocial Response to Stroke  Recent Flowsheet Documentation  Taken 8/18/2023 0832 by Yoselyn Gongora RN  Family/Support System Care:   support provided   self-care encouraged     Problem: Bowel Elimination Impaired (Stroke, Ischemic/Transient Ischemic Attack)  Goal: Effective Bowel Elimination  Outcome: Adequate for Care Transition     Problem: Cerebral Tissue Perfusion (Stroke, Ischemic/Transient Ischemic Attack)  Goal: Optimal Cerebral Tissue Perfusion  Outcome: Adequate for Care Transition     Problem: Functional Ability Impaired (Stroke, Ischemic/Transient Ischemic Attack)  Goal: Optimal Functional Ability  Outcome: Adequate for Care Transition  Intervention: Optimize Functional Ability  Recent Flowsheet Documentation  Taken 8/18/2023 1145 by Yoselyn Gongora RN  Activity Management: activity encouraged  Taken 8/18/2023 0832 by Yoselyn Gongora RN  Activity Management: activity encouraged

## 2023-08-18 NOTE — DISCHARGE SUMMARY
NAME: Flako Coreas ADMIT: 2023   : 1965  PCP: Akash Rodriguez Jr., DO    MRN: [id number] LOS: 0 days   AGE/SEX: 58 y.o. male  ROOM: [facility]     Date of Admission:  2023  Date of Discharge:  2023    PCP: Akash Rodriguez Jr., DO    CHIEF COMPLAINT  Dizziness      DISCHARGE DIAGNOSIS  Active Hospital Problems    Diagnosis  POA    **Acute CVA (cerebrovascular accident) [I63.9]  Yes    Hypertensive emergency [I16.1]  Yes    Dizziness [R42]  Yes    GERD (gastroesophageal reflux disease) [K21.9]  Yes    Mixed hyperlipidemia [E78.2]  Yes    Essential hypertension [I10]  Yes      Resolved Hospital Problems   No resolved problems to display.       SECONDARY DIAGNOSES  Past Medical History:   Diagnosis Date    ADHD (attention deficit hyperactivity disorder)     On going issue    Anxiety     Lepe's esophagus     Benign prostatic hyperplasia Surgery in 2021    Clotting disorder Intestinal    Depression     Diverticulitis     Diverticulosis     Duodenitis     Enteritis     Failure to thrive (child)     Family history of blood clots     GERD (gastroesophageal reflux disease)     Hyperlipidemia     Hypertension     Hypertensive emergency     Low testosterone     Microcytosis     Pancreatitis     Pneumonia     PONV (postoperative nausea and vomiting)     Seasonal affective disorder     Shoulder injury     Unexplained weight loss        CONSULTS   Cardiology  Neurology     HOSPITAL COURSE      Mr. Coreas is a 58 year old male with a past medical history of HTN, mixed hyperlipidemia, mixed anxiety depressive disorder, GERD, no CAD or stroke/TIA history, non-smoker.  He presented with unsteady gait as well as dizziness in the setting of quite high blood pressure greater than 220/140.  He had been out of some of his blood pressure medication at home.    Patient was admitted to the observation unit and did have 2 small cerebellar strokes on imaging.  Patient also had CTA and 2D echo.   Discussion with neurology and radiology did not feel there is evidence of dissection or any areas for intervention and felt this was likely related to small vessel ischemic disease from uncontrolled blood pressure.    Gave some agents for anxiety with improvement in symptoms. He denies any suicidal ideation. He states he will follow up with his outpatient providers for further treatment of his anxiety.     Agents were added for improved blood pressure control which he will also follow-up with outpatient physicians.  He will have Holter monitor at discharge.  Neurology recommends aspirin Plavix for 21 days followed by aspirin monotherapy.  He will follow-up with primary care, cardiology, neurology as well as recommended to have sleep study as an outpatient.    DIAGNOSTICS             8/18/2023 0957 08/18/2023 1045 Vitamin B12 [[id number]]    Blood    Final result Component Value Units   Vitamin B-12 500 pg/mL          08/18/2023 0531 08/18/2023 0605 CBC (No Diff) [[id number]]   (Abnormal)   Blood    Final result Component Value Units   WBC 6.95 10*3/mm3   RBC 5.45 10*6/mm3   Hemoglobin 14.3 g/dL   Hematocrit 43.6 %   MCV 80.0 fL   MCH 26.2 Low  pg   MCHC 32.8 g/dL   RDW 16.0 High  %   RDW-SD 45.7 fl   MPV 10.4 fL   Platelets 221 10*3/mm3          08/18/2023 0531 08/18/2023 0727 Basic Metabolic Panel [[id number]]    (Abnormal)   Blood    Final result Component Value Units   Glucose 110 High  mg/dL   BUN 16 mg/dL   Creatinine 1.00 mg/dL   Sodium 142 mmol/L   Potassium 3.8 mmol/L   Chloride 106 mmol/L   CO2 22.5 mmol/L   Calcium 9.2 mg/dL   BUN/Creatinine Ratio 16.0    Anion Gap 13.5 mmol/L   eGFR 88.3 mL/min/1.73          08/18/2023 0531 08/18/2023 0636 TSH [[id number]]   Blood    Final result Component Value Units   TSH 0.288 uIU/mL          08/18/2023 0531 08/18/2023 0636 T4, Free [[id number]]    Blood    Final result Component Value Units   Free T4 1.39 ng/dL                        08/16/2023 1458 08/16/2023 1718  "Hemoglobin A1c [[id number]]    Blood    Final result Component Value Units   Hemoglobin A1C 5.20 %          08/16/2023 1458 08/16/2023 1704 Lipid Panel [[id number]]    (Abnormal)   Blood    Final result Component Value Units   Total Cholesterol 194 mg/dL   Triglycerides 194 High  mg/dL   HDL Cholesterol 41 mg/dL   LDL Cholesterol 119 High  mg/dL   VLDL Cholesterol 34 mg/dL   LDL/HDL Ratio 2.79            PHYSICAL EXAM  Objective:  Vital signs: (most recent): Blood pressure 159/99, pulse 63, temperature 98.4 øF (36.9 øC), temperature source Oral, resp. rate 17, height 182.9 cm (72\"), weight 102 kg (224 lb 10.4 oz), SpO2 94 %.              Alert  nad  No resp distress  Pleasant     CONDITION ON DISCHARGE  Stable.      DISCHARGE DISPOSITION   Home or Self Care      DISCHARGE MEDICATIONS       Your medication list        START taking these medications      ASA 81mg PO daily   Coreg 6.25mg po bid    Instructions Last Dose Given Next Dose Due   atorvastatin 80 MG tablet  Commonly known as: LIPITOR      Take 1 tablet by mouth Every Night.       clopidogrel 75 MG tablet  Commonly known as: PLAVIX  Start taking on: August 19, 2023      Take 1 tablet by mouth Daily for 21 days.       spironolactone 25 MG tablet  Commonly known as: ALDACTONE  Start taking on: August 19, 2023      Take 1 tablet by mouth Daily.              CHANGE how you take these medications        Instructions Last Dose Given Next Dose Due   amitriptyline 25 MG tablet  Commonly known as: ELAVIL  What changed:   when to take this  reasons to take this      Take 1 tablet by mouth At Night As Needed for Sleep.       amLODIPine 5 MG tablet  Commonly known as: NORVASC  Start taking on: August 19, 2023  What changed:   medication strength  how much to take      Take 1 tablet by mouth Daily.       hydrALAZINE 100 MG tablet  Commonly known as: APRESOLINE  What changed:   medication strength  how much to take      Take 1 tablet by mouth Every 8 (Eight) Hours.     "   lisinopril 40 MG tablet  Commonly known as: PRINIVIL,ZESTRIL  What changed: when to take this      Take 1 tablet by mouth Daily.              CONTINUE taking these medications        Instructions Last Dose Given Next Dose Due   ondansetron 8 MG tablet  Commonly known as: ZOFRAN      Take 1 tablet by mouth Every 8 (Eight) Hours As Needed for Vomiting or Nausea.       pantoprazole 40 MG EC tablet  Commonly known as: PROTONIX      Take 1 tablet by mouth 2 (Two) Times a Day Before Meals.       sucralfate 1 g tablet  Commonly known as: CARAFATE      Take 1 tablet by mouth 4 (Four) Times a Day.       vilazodone 40 MG tablet tablet  Commonly known as: Viibryd      Take 1 tablet by mouth Daily.              STOP taking these medications      rosuvastatin 10 MG tablet  Commonly known as: Crestor                  Where to Get Your Medications        These medications were sent to [facility] [place] [id number]      Hours: 7:00 AM-6:00 PM Mon-Fri, 8:00 AM-4:30 PM Sat-Sun (Closed 12-12:30PM) Phone: [phone]   amLODIPine 5 MG tablet  atorvastatin 80 MG tablet  clopidogrel 75 MG tablet  hydrALAZINE 100 MG tablet  lisinopril 40 MG tablet  spironolactone 25 MG tablet  Coreg  ASA       Information about where to get these medications is not yet available    Ask your nurse or doctor about these medications  amitriptyline 25 MG tablet          Future Appointments   Date Time Provider Department Center   8/21/2023  1:40 PM Akash Rodriguez Jr., DO [facility] E[facility]     Additional Instructions for the Follow-ups that You Need to Schedule       Discharge Follow-up with Specialty: PCP in 1 week, Cardiology in 1-2 weeks, Neurology in 2-3 months, Sleep Study call [facility] sleep clinic   As directed      Specialty: PCP in 1 week, Cardiology in 1-2 weeks, Neurology in 2-3 months, Sleep Study call [facility] sleep clinic               Follow-up Information       Akash Rodriguez Jr.,  DO .    Specialties: Sports Medicine, Family Medicine, Emergency Medicine  Contact information:  [address]  [place] [id number] [phone]                             TEST  RESULTS PENDING AT DISCHARGE         Gabe Marhs MD  [facility]  08/19/23  09:58 EDT      Time: greater than 32 minutes on discharge  It was a pleasure taking care of this patient while in the hospital.

## 2023-08-19 ENCOUNTER — APPOINTMENT (OUTPATIENT)
Dept: CT IMAGING | Facility: HOSPITAL | Age: 58
DRG: 057 | End: 2023-08-19
Payer: COMMERCIAL

## 2023-08-19 ENCOUNTER — TELEPHONE (OUTPATIENT)
Dept: CARDIOLOGY | Facility: CLINIC | Age: 58
End: 2023-08-19
Payer: COMMERCIAL

## 2023-08-19 ENCOUNTER — APPOINTMENT (OUTPATIENT)
Dept: GENERAL RADIOLOGY | Facility: HOSPITAL | Age: 58
DRG: 057 | End: 2023-08-19
Payer: COMMERCIAL

## 2023-08-19 ENCOUNTER — NURSE TRIAGE (OUTPATIENT)
Dept: CALL CENTER | Facility: HOSPITAL | Age: 58
End: 2023-08-19
Payer: COMMERCIAL

## 2023-08-19 ENCOUNTER — READMISSION MANAGEMENT (OUTPATIENT)
Dept: CALL CENTER | Facility: HOSPITAL | Age: 58
End: 2023-08-19
Payer: COMMERCIAL

## 2023-08-19 ENCOUNTER — HOSPITAL ENCOUNTER (INPATIENT)
Facility: HOSPITAL | Age: 58
LOS: 2 days | Discharge: NURSING, REHAB OR LONG-TERM CARE | DRG: 057 | End: 2023-08-24
Attending: EMERGENCY MEDICINE | Admitting: INTERNAL MEDICINE
Payer: COMMERCIAL

## 2023-08-19 VITALS
SYSTOLIC BLOOD PRESSURE: 159 MMHG | BODY MASS INDEX: 30.43 KG/M2 | RESPIRATION RATE: 17 BRPM | HEIGHT: 72 IN | DIASTOLIC BLOOD PRESSURE: 99 MMHG | TEMPERATURE: 98.4 F | WEIGHT: 224.65 LBS | OXYGEN SATURATION: 94 % | HEART RATE: 63 BPM

## 2023-08-19 DIAGNOSIS — I16.1 HYPERTENSIVE EMERGENCY: Primary | ICD-10-CM

## 2023-08-19 DIAGNOSIS — I63.9 CEREBELLAR STROKE: ICD-10-CM

## 2023-08-19 DIAGNOSIS — Z86.73 HISTORY OF CEREBELLAR STROKE: ICD-10-CM

## 2023-08-19 DIAGNOSIS — R27.0 ATAXIA: Primary | ICD-10-CM

## 2023-08-19 DIAGNOSIS — Z91.89 AT RISK FOR APNEA: ICD-10-CM

## 2023-08-19 DIAGNOSIS — I63.9 ACUTE CVA (CEREBROVASCULAR ACCIDENT): ICD-10-CM

## 2023-08-19 LAB
ABO GROUP BLD: NORMAL
ALBUMIN SERPL-MCNC: 5.2 G/DL (ref 3.5–5.2)
ALBUMIN/GLOB SERPL: 2 G/DL
ALP SERPL-CCNC: 91 U/L (ref 39–117)
ALT SERPL W P-5'-P-CCNC: 24 U/L (ref 1–41)
ANION GAP SERPL CALCULATED.3IONS-SCNC: 14 MMOL/L (ref 5–15)
APTT PPP: 30.8 SECONDS (ref 22.7–35.4)
AST SERPL-CCNC: 18 U/L (ref 1–40)
BASOPHILS # BLD AUTO: 0.06 10*3/MM3 (ref 0–0.2)
BASOPHILS NFR BLD AUTO: 0.6 % (ref 0–1.5)
BILIRUB SERPL-MCNC: 0.4 MG/DL (ref 0–1.2)
BLD GP AB SCN SERPL QL: NEGATIVE
BUN SERPL-MCNC: 22 MG/DL (ref 6–20)
BUN/CREAT SERPL: 18 (ref 7–25)
CALCIUM SPEC-SCNC: 10.3 MG/DL (ref 8.6–10.5)
CHLORIDE SERPL-SCNC: 105 MMOL/L (ref 98–107)
CO2 SERPL-SCNC: 25 MMOL/L (ref 22–29)
CREAT BLDA-MCNC: 1.2 MG/DL (ref 0.6–1.3)
CREAT SERPL-MCNC: 1.22 MG/DL (ref 0.76–1.27)
DEPRECATED RDW RBC AUTO: 45 FL (ref 37–54)
EGFRCR SERPLBLD CKD-EPI 2021: 69.6 ML/MIN/1.73
EOSINOPHIL # BLD AUTO: 0.74 10*3/MM3 (ref 0–0.4)
EOSINOPHIL NFR BLD AUTO: 7.4 % (ref 0.3–6.2)
ERYTHROCYTE [DISTWIDTH] IN BLOOD BY AUTOMATED COUNT: 16.2 % (ref 12.3–15.4)
GLOBULIN UR ELPH-MCNC: 2.6 GM/DL
GLUCOSE BLDC GLUCOMTR-MCNC: 137 MG/DL (ref 70–130)
GLUCOSE BLDC GLUCOMTR-MCNC: 150 MG/DL (ref 70–130)
GLUCOSE SERPL-MCNC: 142 MG/DL (ref 65–99)
HCT VFR BLD AUTO: 49.9 % (ref 37.5–51)
HGB BLD-MCNC: 16.4 G/DL (ref 13–17.7)
HOLD SPECIMEN: NORMAL
IMM GRANULOCYTES # BLD AUTO: 0.03 10*3/MM3 (ref 0–0.05)
IMM GRANULOCYTES NFR BLD AUTO: 0.3 % (ref 0–0.5)
INR PPP: 0.9 (ref 0.9–1.1)
LYMPHOCYTES # BLD AUTO: 2.04 10*3/MM3 (ref 0.7–3.1)
LYMPHOCYTES NFR BLD AUTO: 20.3 % (ref 19.6–45.3)
MCH RBC QN AUTO: 26.2 PG (ref 26.6–33)
MCHC RBC AUTO-ENTMCNC: 32.9 G/DL (ref 31.5–35.7)
MCV RBC AUTO: 79.8 FL (ref 79–97)
MONOCYTES # BLD AUTO: 0.61 10*3/MM3 (ref 0.1–0.9)
MONOCYTES NFR BLD AUTO: 6.1 % (ref 5–12)
NEUTROPHILS NFR BLD AUTO: 6.56 10*3/MM3 (ref 1.7–7)
NEUTROPHILS NFR BLD AUTO: 65.3 % (ref 42.7–76)
NRBC BLD AUTO-RTO: 0 /100 WBC (ref 0–0.2)
PLATELET # BLD AUTO: 300 10*3/MM3 (ref 140–450)
PMV BLD AUTO: 10.3 FL (ref 6–12)
POTASSIUM SERPL-SCNC: 4.1 MMOL/L (ref 3.5–5.2)
PROT SERPL-MCNC: 7.8 G/DL (ref 6–8.5)
PROTHROMBIN TIME: 12.3 SECONDS (ref 11.7–14.2)
RBC # BLD AUTO: 6.25 10*6/MM3 (ref 4.14–5.8)
RH BLD: POSITIVE
SODIUM SERPL-SCNC: 144 MMOL/L (ref 136–145)
T&S EXPIRATION DATE: NORMAL
TROPONIN T SERPL HS-MCNC: 8 NG/L
WBC NRBC COR # BLD: 10.04 10*3/MM3 (ref 3.4–10.8)
WHOLE BLOOD HOLD COAG: NORMAL
WHOLE BLOOD HOLD COAG: NORMAL
WHOLE BLOOD HOLD SPECIMEN: NORMAL
WHOLE BLOOD HOLD SPECIMEN: NORMAL

## 2023-08-19 PROCEDURE — 85610 PROTHROMBIN TIME: CPT | Performed by: EMERGENCY MEDICINE

## 2023-08-19 PROCEDURE — 70498 CT ANGIOGRAPHY NECK: CPT

## 2023-08-19 PROCEDURE — 93005 ELECTROCARDIOGRAM TRACING: CPT | Performed by: EMERGENCY MEDICINE

## 2023-08-19 PROCEDURE — 70496 CT ANGIOGRAPHY HEAD: CPT

## 2023-08-19 PROCEDURE — G0378 HOSPITAL OBSERVATION PER HR: HCPCS

## 2023-08-19 PROCEDURE — 86901 BLOOD TYPING SEROLOGIC RH(D): CPT | Performed by: EMERGENCY MEDICINE

## 2023-08-19 PROCEDURE — 80053 COMPREHEN METABOLIC PANEL: CPT | Performed by: EMERGENCY MEDICINE

## 2023-08-19 PROCEDURE — 82565 ASSAY OF CREATININE: CPT

## 2023-08-19 PROCEDURE — 36415 COLL VENOUS BLD VENIPUNCTURE: CPT | Performed by: EMERGENCY MEDICINE

## 2023-08-19 PROCEDURE — 99232 SBSQ HOSP IP/OBS MODERATE 35: CPT | Performed by: INTERNAL MEDICINE

## 2023-08-19 PROCEDURE — 85730 THROMBOPLASTIN TIME PARTIAL: CPT | Performed by: EMERGENCY MEDICINE

## 2023-08-19 PROCEDURE — 82948 REAGENT STRIP/BLOOD GLUCOSE: CPT

## 2023-08-19 PROCEDURE — 86850 RBC ANTIBODY SCREEN: CPT | Performed by: EMERGENCY MEDICINE

## 2023-08-19 PROCEDURE — 99285 EMERGENCY DEPT VISIT HI MDM: CPT

## 2023-08-19 PROCEDURE — 4A03X5D MEASUREMENT OF ARTERIAL FLOW, INTRACRANIAL, EXTERNAL APPROACH: ICD-10-PCS | Performed by: RADIOLOGY

## 2023-08-19 PROCEDURE — 93010 ELECTROCARDIOGRAM REPORT: CPT | Performed by: INTERNAL MEDICINE

## 2023-08-19 PROCEDURE — 25010000002 ONDANSETRON PER 1 MG: Performed by: INTERNAL MEDICINE

## 2023-08-19 PROCEDURE — 86900 BLOOD TYPING SEROLOGIC ABO: CPT | Performed by: EMERGENCY MEDICINE

## 2023-08-19 PROCEDURE — 85025 COMPLETE CBC W/AUTO DIFF WBC: CPT | Performed by: EMERGENCY MEDICINE

## 2023-08-19 PROCEDURE — 25510000001 IOPAMIDOL PER 1 ML: Performed by: EMERGENCY MEDICINE

## 2023-08-19 PROCEDURE — 0042T HC CT CEREBRAL PERFUSION W/WO CONTRAST: CPT

## 2023-08-19 PROCEDURE — 71045 X-RAY EXAM CHEST 1 VIEW: CPT

## 2023-08-19 PROCEDURE — 84484 ASSAY OF TROPONIN QUANT: CPT | Performed by: EMERGENCY MEDICINE

## 2023-08-19 RX ORDER — ASPIRIN 325 MG
325 TABLET ORAL DAILY
Status: DISCONTINUED | OUTPATIENT
Start: 2023-08-19 | End: 2023-08-20

## 2023-08-19 RX ORDER — BISACODYL 10 MG
10 SUPPOSITORY, RECTAL RECTAL DAILY PRN
Status: DISCONTINUED | OUTPATIENT
Start: 2023-08-19 | End: 2023-08-24 | Stop reason: HOSPADM

## 2023-08-19 RX ORDER — HYDROXYZINE HYDROCHLORIDE 25 MG/1
25 TABLET, FILM COATED ORAL 3 TIMES DAILY PRN
Qty: 60 TABLET | Refills: 0 | Status: SHIPPED | OUTPATIENT
Start: 2023-08-19

## 2023-08-19 RX ORDER — HYDROXYZINE HYDROCHLORIDE 25 MG/1
25 TABLET, FILM COATED ORAL 3 TIMES DAILY PRN
Status: DISCONTINUED | OUTPATIENT
Start: 2023-08-19 | End: 2023-08-24 | Stop reason: HOSPADM

## 2023-08-19 RX ORDER — ASPIRIN 300 MG/1
300 SUPPOSITORY RECTAL DAILY
Status: DISCONTINUED | OUTPATIENT
Start: 2023-08-19 | End: 2023-08-20

## 2023-08-19 RX ORDER — ACETAMINOPHEN 325 MG/1
650 TABLET ORAL EVERY 4 HOURS PRN
Status: DISCONTINUED | OUTPATIENT
Start: 2023-08-19 | End: 2023-08-24 | Stop reason: HOSPADM

## 2023-08-19 RX ORDER — CARVEDILOL 6.25 MG/1
6.25 TABLET ORAL 2 TIMES DAILY WITH MEALS
Status: DISCONTINUED | OUTPATIENT
Start: 2023-08-19 | End: 2023-08-24

## 2023-08-19 RX ORDER — AMLODIPINE BESYLATE 5 MG/1
5 TABLET ORAL DAILY
Status: DISCONTINUED | OUTPATIENT
Start: 2023-08-20 | End: 2023-08-24 | Stop reason: HOSPADM

## 2023-08-19 RX ORDER — ACETAMINOPHEN 650 MG/1
650 SUPPOSITORY RECTAL EVERY 4 HOURS PRN
Status: DISCONTINUED | OUTPATIENT
Start: 2023-08-19 | End: 2023-08-24 | Stop reason: HOSPADM

## 2023-08-19 RX ORDER — LORAZEPAM 2 MG/ML
2 INJECTION INTRAMUSCULAR
Status: DISCONTINUED | OUTPATIENT
Start: 2023-08-19 | End: 2023-08-23

## 2023-08-19 RX ORDER — LORAZEPAM 1 MG/1
1 TABLET ORAL
Status: DISCONTINUED | OUTPATIENT
Start: 2023-08-19 | End: 2023-08-23

## 2023-08-19 RX ORDER — CLOPIDOGREL BISULFATE 75 MG/1
75 TABLET ORAL DAILY
Status: DISCONTINUED | OUTPATIENT
Start: 2023-08-20 | End: 2023-08-24 | Stop reason: HOSPADM

## 2023-08-19 RX ORDER — FOLIC ACID 1 MG/1
1 TABLET ORAL DAILY
Status: DISCONTINUED | OUTPATIENT
Start: 2023-08-20 | End: 2023-08-24 | Stop reason: HOSPADM

## 2023-08-19 RX ORDER — PANTOPRAZOLE SODIUM 40 MG/1
40 TABLET, DELAYED RELEASE ORAL
Status: DISCONTINUED | OUTPATIENT
Start: 2023-08-20 | End: 2023-08-24 | Stop reason: HOSPADM

## 2023-08-19 RX ORDER — LORAZEPAM 1 MG/1
2 TABLET ORAL
Status: DISCONTINUED | OUTPATIENT
Start: 2023-08-19 | End: 2023-08-23

## 2023-08-19 RX ORDER — MULTIPLE VITAMINS W/ MINERALS TAB 9MG-400MCG
1 TAB ORAL DAILY
Status: DISCONTINUED | OUTPATIENT
Start: 2023-08-20 | End: 2023-08-24 | Stop reason: HOSPADM

## 2023-08-19 RX ORDER — LISINOPRIL 40 MG/1
40 TABLET ORAL DAILY
Status: DISCONTINUED | OUTPATIENT
Start: 2023-08-20 | End: 2023-08-24 | Stop reason: HOSPADM

## 2023-08-19 RX ORDER — HYDRALAZINE HYDROCHLORIDE 50 MG/1
100 TABLET, FILM COATED ORAL EVERY 8 HOURS SCHEDULED
Status: DISCONTINUED | OUTPATIENT
Start: 2023-08-19 | End: 2023-08-24 | Stop reason: HOSPADM

## 2023-08-19 RX ORDER — SODIUM CHLORIDE 0.9 % (FLUSH) 0.9 %
10 SYRINGE (ML) INJECTION AS NEEDED
Status: DISCONTINUED | OUTPATIENT
Start: 2023-08-19 | End: 2023-08-24 | Stop reason: HOSPADM

## 2023-08-19 RX ORDER — SODIUM CHLORIDE 9 MG/ML
75 INJECTION, SOLUTION INTRAVENOUS CONTINUOUS
Status: DISCONTINUED | OUTPATIENT
Start: 2023-08-19 | End: 2023-08-22

## 2023-08-19 RX ORDER — THIAMINE HYDROCHLORIDE 100 MG/ML
200 INJECTION, SOLUTION INTRAMUSCULAR; INTRAVENOUS EVERY 8 HOURS SCHEDULED
Status: DISCONTINUED | OUTPATIENT
Start: 2023-08-20 | End: 2023-08-24 | Stop reason: HOSPADM

## 2023-08-19 RX ORDER — SPIRONOLACTONE 25 MG/1
25 TABLET ORAL DAILY
Status: DISCONTINUED | OUTPATIENT
Start: 2023-08-20 | End: 2023-08-24 | Stop reason: HOSPADM

## 2023-08-19 RX ORDER — ATORVASTATIN CALCIUM 80 MG/1
80 TABLET, FILM COATED ORAL NIGHTLY
Status: DISCONTINUED | OUTPATIENT
Start: 2023-08-19 | End: 2023-08-20

## 2023-08-19 RX ORDER — VILAZODONE HYDROCHLORIDE 40 MG/1
40 TABLET ORAL DAILY
Status: DISCONTINUED | OUTPATIENT
Start: 2023-08-20 | End: 2023-08-24 | Stop reason: HOSPADM

## 2023-08-19 RX ORDER — LORAZEPAM 2 MG/ML
1 INJECTION INTRAMUSCULAR
Status: DISCONTINUED | OUTPATIENT
Start: 2023-08-19 | End: 2023-08-23

## 2023-08-19 RX ORDER — ONDANSETRON 2 MG/ML
4 INJECTION INTRAMUSCULAR; INTRAVENOUS EVERY 6 HOURS PRN
Status: DISCONTINUED | OUTPATIENT
Start: 2023-08-19 | End: 2023-08-24 | Stop reason: HOSPADM

## 2023-08-19 RX ORDER — CARVEDILOL 6.25 MG/1
6.25 TABLET ORAL 2 TIMES DAILY WITH MEALS
Qty: 60 TABLET | Refills: 0 | Status: SHIPPED | OUTPATIENT
Start: 2023-08-19 | End: 2023-08-24 | Stop reason: HOSPADM

## 2023-08-19 RX ADMIN — HYDROXYZINE HYDROCHLORIDE 25 MG: 25 TABLET ORAL at 08:15

## 2023-08-19 RX ADMIN — SODIUM CHLORIDE 75 ML/HR: 9 INJECTION, SOLUTION INTRAVENOUS at 22:25

## 2023-08-19 RX ADMIN — LORAZEPAM 2 MG: 1 TABLET ORAL at 23:18

## 2023-08-19 RX ADMIN — Medication 10 ML: at 08:06

## 2023-08-19 RX ADMIN — ATORVASTATIN CALCIUM 80 MG: 80 TABLET, FILM COATED ORAL at 23:17

## 2023-08-19 RX ADMIN — ASPIRIN 325 MG: 325 TABLET ORAL at 23:18

## 2023-08-19 RX ADMIN — VILAZODONE HYDROCHLORIDE 40 MG: 40 TABLET, FILM COATED ORAL at 08:05

## 2023-08-19 RX ADMIN — IOPAMIDOL 150 ML: 755 INJECTION, SOLUTION INTRAVENOUS at 17:04

## 2023-08-19 RX ADMIN — ONDANSETRON 4 MG: 2 INJECTION INTRAMUSCULAR; INTRAVENOUS at 19:26

## 2023-08-19 RX ADMIN — ASPIRIN 81 MG: 81 TABLET, COATED ORAL at 08:04

## 2023-08-19 RX ADMIN — AMLODIPINE BESYLATE 5 MG: 5 TABLET ORAL at 08:04

## 2023-08-19 RX ADMIN — SPIRONOLACTONE 25 MG: 25 TABLET ORAL at 08:05

## 2023-08-19 RX ADMIN — LISINOPRIL 40 MG: 20 TABLET ORAL at 08:04

## 2023-08-19 RX ADMIN — CARVEDILOL 6.25 MG: 6.25 TABLET, FILM COATED ORAL at 08:05

## 2023-08-19 RX ADMIN — CLOPIDOGREL BISULFATE 75 MG: 75 TABLET, FILM COATED ORAL at 08:04

## 2023-08-19 RX ADMIN — HYDRALAZINE HYDROCHLORIDE 100 MG: 50 TABLET, FILM COATED ORAL at 05:30

## 2023-08-19 NOTE — ED NOTES
Pt was just discharged at 1200 from this hospital. Pt got home and he was having trouble standing up. He stated he would walk a few steps and then have to lay down. Pt states his speech is not right and he is having trouble thinking. Pt stated he had a cerebral stroke on Sunday. No complaints of numbness or tingling. Pt has ringing in the ears.

## 2023-08-19 NOTE — PROGRESS NOTES
LOS: 0 days   Patient Care Team:  Akash Rodriguez Jr., DO as PCP - General (Sports Medicine)    Chief Complaint: Follow-up hypertensive emergency, acute CVA.    Interval History: Seems less anxious today.  He is going to be discharged.  Blood pressure still high, but improved.  No chest pain or shortness of breath.    Vital Signs:  Temp:  [97.7 øF (36.5 øC)-99.5 øF (37.5 øC)] 98.4 øF (36.9 øC)  Heart Rate:  [63-89] 63  Resp:  [16-18] 17  BP: (143-177)/() 159/99    Intake/Output Summary (Last 24 hours) at 8/19/2023 1254  Last data filed at 8/19/2023 0515  Gross per 24 hour   Intake --   Output 220 ml   Net -220 ml       Physical Exam:   General Appearance:    No acute distress, alert and oriented x4   Lungs:     Clear to auscultation bilaterally     Heart:    Regular rhythm and normal rate.  No murmurs, gallops, or    rubs.   Abdomen:     Soft, nontender, nondistended.    Extremities:   No clubbing, cyanosis, or edema.     Results Review:    Results from last 7 days   Lab Units 08/18/23  0531   SODIUM mmol/L 142   POTASSIUM mmol/L 3.8   CHLORIDE mmol/L 106   CO2 mmol/L 22.5   BUN mg/dL 16   CREATININE mg/dL 1.00   GLUCOSE mg/dL 110*   CALCIUM mg/dL 9.2     Results from last 7 days   Lab Units 08/16/23  1458   HSTROP T ng/L 6     Results from last 7 days   Lab Units 08/18/23  0531   WBC 10*3/mm3 6.95   HEMOGLOBIN g/dL 14.3   HEMATOCRIT % 43.6   PLATELETS 10*3/mm3 221         Results from last 7 days   Lab Units 08/16/23  1458   CHOLESTEROL mg/dL 194     Results from last 7 days   Lab Units 08/16/23  1458   MAGNESIUM mg/dL 2.4     Results from last 7 days   Lab Units 08/16/23  1458   CHOLESTEROL mg/dL 194   TRIGLYCERIDES mg/dL 194*   HDL CHOL mg/dL 41   LDL CHOL mg/dL 119*       I reviewed the patient's new clinical results.        Assessment:  1.  Hypertensive emergency  2.  Acute CVA with 2 small infarcts in the left cerebellum  3.  Hyperlipidemia  4.  GERD with a history of Lepe's esophagus  5.   Anxiety    Plan:  -Again, some of his anxiety may be driving the blood pressure to be high.  In turn, his blood pressure may also be driving his anxiety to some degree.    -His blood pressure is slightly better, although he is still high at times.  I have him on hydralazine 100 mg every 8 hours (increased from 50 mg every 8 hours).  I decrease the lisinopril to 40 mg/day from 40 mg twice daily.  I added spironolactone 25 mg/day, and resumed his amlodipine at 5 mg/day.      -Continue aspirin, Plavix, and Lipitor 80 mg qhs.    -Going home today.  I am going to arrange for follow-up in our office with an APRN in 2 to 3 weeks (mainly for blood pressure checks).      Carlos Foster MD  08/19/23  12:54 EDT

## 2023-08-19 NOTE — PLAN OF CARE
Goal Outcome Evaluation:  Plan of Care Reviewed With: patient        Progress: no change  Outcome Evaluation: Patients blood pressure slightly elevated, all other vitals stable. New medication for high blood pressure started this shift. Patient denies pain and discomfort. Patient asleep most of shift. Patients NIH is 0 this shift. Will continue to monitor.

## 2023-08-19 NOTE — PLAN OF CARE
Goal Outcome Evaluation:       Patient will be discharged home today and NIH remains 0.  Problem: Adult Inpatient Plan of Care  Goal: Plan of Care Review  Outcome: Adequate for Care Transition  Goal: Patient-Specific Goal (Individualized)  Outcome: Adequate for Care Transition  Goal: Absence of Hospital-Acquired Illness or Injury  Outcome: Adequate for Care Transition  Intervention: Identify and Manage Fall Risk  Recent Flowsheet Documentation  Taken 8/19/2023 0757 by Yoselyn Gongora RN  Safety Promotion/Fall Prevention:   activity supervised   room organization consistent   safety round/check completed   toileting scheduled   lighting adjusted   fall prevention program maintained  Intervention: Prevent Skin Injury  Recent Flowsheet Documentation  Taken 8/19/2023 0757 by Yoselyn Gongora RN  Body Position: position changed independently  Intervention: Prevent and Manage VTE (Venous Thromboembolism) Risk  Recent Flowsheet Documentation  Taken 8/19/2023 0757 by Yoselyn Gongroa RN  Activity Management: activity encouraged  Range of Motion:   active ROM (range of motion) encouraged   ROM (range of motion) performed  Intervention: Prevent Infection  Recent Flowsheet Documentation  Taken 8/19/2023 0757 by Yoselyn Gongora RN  Infection Prevention:   environmental surveillance performed   equipment surfaces disinfected   single patient room provided  Goal: Optimal Comfort and Wellbeing  Outcome: Adequate for Care Transition  Intervention: Provide Person-Centered Care  Recent Flowsheet Documentation  Taken 8/19/2023 0757 by Yoselyn Gongora RN  Trust Relationship/Rapport:   thoughts/feelings acknowledged   reassurance provided   questions encouraged   care explained   choices provided  Goal: Readiness for Transition of Care  Outcome: Adequate for Care Transition     Problem: Diabetes Comorbidity  Goal: Blood Glucose Level Within Targeted Range  Outcome: Adequate for Care Transition     Problem: Hypertension Comorbidity  Goal: Blood Pressure  in Desired Range  Outcome: Adequate for Care Transition  Intervention: Maintain Blood Pressure Management  Recent Flowsheet Documentation  Taken 8/19/2023 0757 by Yoselyn Gongora RN  Medication Review/Management: medications reviewed     Problem: Fall Injury Risk  Goal: Absence of Fall and Fall-Related Injury  Outcome: Adequate for Care Transition  Intervention: Identify and Manage Contributors  Recent Flowsheet Documentation  Taken 8/19/2023 0757 by Yoselyn Gongora RN  Medication Review/Management: medications reviewed  Self-Care Promotion: BADL personal objects within reach  Intervention: Promote Injury-Free Environment  Recent Flowsheet Documentation  Taken 8/19/2023 0757 by Yoselyn Gongora RN  Safety Promotion/Fall Prevention:   activity supervised   room organization consistent   safety round/check completed   toileting scheduled   lighting adjusted   fall prevention program maintained

## 2023-08-19 NOTE — ED PROVIDER NOTES
EMERGENCY DEPARTMENT ENCOUNTER    Room Number:  26/26  PCP: Akash Rodriguez Jr., DO  Historian: Patient      HPI:  Chief Complaint: Dizziness, difficulty walking  A complete HPI/ROS/PMH/PSH/SH/FH are unobtainable due to: Nothing  Context: Flako Coreas is a 58 y.o. male who presents to the ED c/o dizziness, difficulty walking, onset this evening at his home prior to arrival.  Patient states that he was just discharged in the hospital this morning after being diagnosed with a cerebellar stroke.  He reports that he was ambulating around his room this morning without much difficulty.  Since returning home this afternoon he experienced dizziness and difficulty walking.  He reports that he was falling into walls but did not fall to the floor.  He denies double vision but reports that his vision is little blurry currently.  He denies difficulty with speech or swallowing currently.  He denies any focal weakness in his extremities.  No acute sensory changes.        PAST MEDICAL HISTORY  Active Ambulatory Problems     Diagnosis Date Noted    Mixed anxiety depressive disorder 12/11/2012    Mixed hyperlipidemia 06/26/2017    Essential hypertension 06/26/2017    Diverticulosis 07/27/2018    Nodule of spleen 06/27/2018    Chronic bilateral lower abdominal pain 08/10/2018    Lepe's esophagus without dysplasia 10/10/2018    GERD (gastroesophageal reflux disease) 09/18/2018    History of esophagitis 12/08/2022    Dizziness 08/16/2023    Acute CVA (cerebrovascular accident) 08/17/2023    Hypertensive emergency 08/19/2023     Resolved Ambulatory Problems     Diagnosis Date Noted    Abdominal pain 06/20/2018    Generalized abdominal pain 12/08/2022    Gastrointestinal hemorrhage 12/08/2022     Past Medical History:   Diagnosis Date    ADHD (attention deficit hyperactivity disorder)     Anxiety     Lepe's esophagus     Benign prostatic hyperplasia Surgery in 12/2021    Clotting disorder Intestinal    Depression      Diverticulitis     Duodenitis     Enteritis     Failure to thrive (child)     Family history of blood clots     Hyperlipidemia     Hypertension     Low testosterone     Microcytosis     Pancreatitis     Pneumonia     PONV (postoperative nausea and vomiting)     Seasonal affective disorder     Shoulder injury     Unexplained weight loss          PAST SURGICAL HISTORY  Past Surgical History:   Procedure Laterality Date    COLON SURGERY      COLONOSCOPY      COLONOSCOPY N/A 12/11/2022    Procedure: COLONOSCOPY INTO CECUM WITH HOT SNARE POLYPECTOMY;  Surgeon: Albert Gallo MD;  Location: [facility] ENDOSCOPY;  Service: Gastroenterology;  Laterality: N/A;  PRE- GI BLEED  POST- DIVERTICULOSIS, POLYP    ENDOSCOPY N/A 12/10/2022    Procedure: ESOPHAGOGASTRODUODENOSCOPY;  Surgeon: Albert Gallo MD;  Location: [facility] ENDOSCOPY;  Service: Gastroenterology;  Laterality: N/A;  PRE- DARK STOOLS  POST- BARRETTS ESOPHAGUS    FRACTURE SURGERY  1980    for broken arm    FRACTURE SURGERY      for broken hand    INCISION AND DRAINAGE ABSCESS  2015    anal    PROSTATE SURGERY      SMALL INTESTINE SURGERY      TONSILLECTOMY           FAMILY HISTORY  Family History   Problem Relation Age of Onset    Stroke Mother     Stroke Father          SOCIAL HISTORY  Social History     Socioeconomic History    Marital status: Single   Tobacco Use    Smoking status: Never    Smokeless tobacco: Never   Vaping Use    Vaping Use: Never used   Substance and Sexual Activity    Alcohol use: Yes     Alcohol/week: 20.0 standard drinks     Types: 10 Cans of beer, 10 Shots of liquor per week     Comment: pt states very rarely    Drug use: Yes     Frequency: 1.0 times per week     Types: Marijuana     Comment: Pt states he may have smoked marijuana 3 weeks ago (stated on 11-07-18)    Sexual activity: Not Currently     Partners: Female         ALLERGIES  Patient has no known allergies.        REVIEW OF SYSTEMS  Review of Systems   Review of all 14 systems is  negative other than stated in the HPI above.      PHYSICAL EXAM  ED Triage Vitals [08/19/23 1620]   Temp Heart Rate Resp BP SpO2   98.7 øF (37.1 øC) 85 18 -- 96 %      Temp src Heart Rate Source Patient Position BP Location FiO2 (%)   -- -- -- -- --       Physical Exam      GENERAL: Awake and alert, no acute distress  HENT: nares patent  EYES: no scleral icterus, pupils 3 mm reactive bilaterally, EOMI, no nystagmus, visual fields full  CV: regular rhythm, normal rate  RESPIRATORY: normal effort  ABDOMEN: soft, nontender throughout  MUSCULOSKELETAL: no deformity  NEURO: alert, moves all extremities with normal strength and no drift, follows commands, cranial nerves II through XII grossly intact with speech fluent and clear, normal sensation to light touch throughout all extremities  PSYCH:  calm, cooperative  SKIN: warm, dry    Vital signs and nursing notes reviewed.          LAB RESULTS  Recent Results (from the past 24 hour(s))   POC Glucose Once    Collection Time: 08/19/23  4:27 PM    Specimen: Blood   Result Value Ref Range    Glucose 137 (H) 70 - 130 mg/dL   Green Top (Gel)    Collection Time: 08/19/23  4:28 PM   Result Value Ref Range    Extra Tube Hold for add-ons.    Lavender Top    Collection Time: 08/19/23  4:28 PM   Result Value Ref Range    Extra Tube hold for add-on    Gold Top - SST    Collection Time: 08/19/23  4:28 PM   Result Value Ref Range    Extra Tube Hold for add-ons.    Light Blue Top    Collection Time: 08/19/23  4:28 PM   Result Value Ref Range    Extra Tube Hold for add-ons.    Comprehensive Metabolic Panel    Collection Time: 08/19/23  4:28 PM    Specimen: Blood   Result Value Ref Range    Glucose 142 (H) 65 - 99 mg/dL    BUN 22 (H) 6 - 20 mg/dL    Creatinine 1.22 0.76 - 1.27 mg/dL    Sodium 144 136 - 145 mmol/L    Potassium 4.1 3.5 - 5.2 mmol/L    Chloride 105 98 - 107 mmol/L    CO2 25.0 22.0 - 29.0 mmol/L    Calcium 10.3 8.6 - 10.5 mg/dL    Total Protein 7.8 6.0 - 8.5 g/dL    Albumin 5.2  3.5 - 5.2 g/dL    ALT (SGPT) 24 1 - 41 U/L    AST (SGOT) 18 1 - 40 U/L    Alkaline Phosphatase 91 39 - 117 U/L    Total Bilirubin 0.4 0.0 - 1.2 mg/dL    Globulin 2.6 gm/dL    A/G Ratio 2.0 g/dL    BUN/Creatinine Ratio 18.0 7.0 - 25.0    Anion Gap 14.0 5.0 - 15.0 mmol/L    eGFR 69.6 >60.0 mL/min/1.73   Protime-INR    Collection Time: 08/19/23  4:28 PM    Specimen: Blood   Result Value Ref Range    Protime 12.3 11.7 - 14.2 Seconds    INR 0.90 0.90 - 1.10   aPTT    Collection Time: 08/19/23  4:28 PM    Specimen: Blood   Result Value Ref Range    PTT 30.8 22.7 - 35.4 seconds   Single High Sensitivity Troponin T    Collection Time: 08/19/23  4:28 PM    Specimen: Blood   Result Value Ref Range    HS Troponin T 8 <15 ng/L   CBC Auto Differential    Collection Time: 08/19/23  4:28 PM    Specimen: Blood   Result Value Ref Range    WBC 10.04 3.40 - 10.80 10*3/mm3    RBC 6.25 (H) 4.14 - 5.80 10*6/mm3    Hemoglobin 16.4 13.0 - 17.7 g/dL    Hematocrit 49.9 37.5 - 51.0 %    MCV 79.8 79.0 - 97.0 fL    MCH 26.2 (L) 26.6 - 33.0 pg    MCHC 32.9 31.5 - 35.7 g/dL    RDW 16.2 (H) 12.3 - 15.4 %    RDW-SD 45.0 37.0 - 54.0 fl    MPV 10.3 6.0 - 12.0 fL    Platelets 300 140 - 450 10*3/mm3    Neutrophil % 65.3 42.7 - 76.0 %    Lymphocyte % 20.3 19.6 - 45.3 %    Monocyte % 6.1 5.0 - 12.0 %    Eosinophil % 7.4 (H) 0.3 - 6.2 %    Basophil % 0.6 0.0 - 1.5 %    Immature Grans % 0.3 0.0 - 0.5 %    Neutrophils, Absolute 6.56 1.70 - 7.00 10*3/mm3    Lymphocytes, Absolute 2.04 0.70 - 3.10 10*3/mm3    Monocytes, Absolute 0.61 0.10 - 0.90 10*3/mm3    Eosinophils, Absolute 0.74 (H) 0.00 - 0.40 10*3/mm3    Basophils, Absolute 0.06 0.00 - 0.20 10*3/mm3    Immature Grans, Absolute 0.03 0.00 - 0.05 10*3/mm3    nRBC 0.0 0.0 - 0.2 /100 WBC   Type & Screen    Collection Time: 08/19/23  4:31 PM    Specimen: Blood   Result Value Ref Range    ABO Type A     RH type Positive     Antibody Screen Negative     T&S Expiration Date 8/22/2023 11:59:59 PM    POC  Creatinine    Collection Time: 08/19/23  4:54 PM    Specimen: Blood   Result Value Ref Range    Creatinine 1.20 0.60 - 1.30 mg/dL   ECG 12 Lead Stroke Evaluation    Collection Time: 08/19/23  5:22 PM   Result Value Ref Range    QT Interval 452 ms       Ordered the above labs and reviewed the results.        RADIOLOGY  XR Chest 1 View    Result Date: 8/19/2023  STAT PORTABLE RADIOGRAPHIC VIEW OF THE CHEST  CLINICAL HISTORY: Acute stroke protocol.  FINDINGS:  The lungs are clear of acute infiltrates. The cardiomediastinal silhouette is within normal limits. The osseous structures are unremarkable.       No active disease in the chest.  This report was finalized on 8/19/2023 7:02 PM by Dr. Aniceto Deleon M.D.      CT Angiogram Head w AI Analysis of LVO, CT Angiogram Neck, CT CEREBRAL PERFUSION WITH & WITHOUT CONTRAST    Result Date: 8/19/2023  CT ANGIOGRAM OF THE HEAD AND NECK AND CT PERFUSION STUDY  CLINICAL HISTORY: Difficulty standing up. Recent cerebellar infarct.  TECHNIQUE: A noncontrast head CT was performed with 3 mm axial images. Thereafter, a CT perfusion study was performed after the dynamic bolus of IV contrast. Standard perfusion maps were constructed with RAPID software. A CT angiogram of the head and neck was then performed with 1 mm axial images. Sagittal, coronal, and 3-dimensional reconstructed images were obtained. Finally, a delayed postcontrast head CT was performed with 3 mm axial images. AI analysis of LVO was utilized.  COMPARISON: CT angiogram of the head and neck dated 08/16/2023 and CT head dated 08/18/2023.  FINDINGS:  NONCONTRAST HEAD CT: On the previous MRI of the brain dated 08/16/2023, there were 2 small foci of acute infarction noted within the inferior aspect of the left cerebellar hemisphere within the left PICA distribution. These are not well delineated on this CT examination. There is no convincing new area of acute infarction on the head CT.  Incidental note is made of partial  opacification of the left mastoid air cells which was also seen on the prior head CT dated 08/18/2023.  CT PERFUSION STUDY: There is no abnormality in the CBF less than 30% or the time to maximal enhancement greater than 6 second maps. However, there are some areas of diminished perfusion within the left occipital lobe on the time to maximum enhancement greater than 4 second maps and measures up to 13 cc.  CTA NECK: There is a classic configuration of the aortic arch. There is no significant great vessel origin stenosis. A mild degree of stenosis is appreciated at the origin of the dominant right vertebral artery. Atherosclerotic changes are identified within the carotid bifurcations and proximal internal carotids. However, strictly speaking, there is no significant NASCET stenosis. Again, no significant interval change is seen in the CT angiogram of the neck when compared to the prior study dated 08/16/2023.  CTA HEAD: There is mild to moderate atherosclerotic irregularity and narrowing noted within the V4 segment of the right vertebral artery as well as the basilar artery. The basilar artery is unremarkable in appearance. The posterior cerebral arteries are unremarkable. Atherosclerotic calcifications are appreciated within the carotid siphons resulting in only mild degrees of luminal compromise. The middle and anterior cerebral arteries are unremarkable in appearance. When compared to the prior CT angiogram of the head, there is no significant interval change  DELAYED POSTCONTRAST HEAD CT: No abnormal foci of contrast enhancement are identified within the brain parenchyma.      There is no abnormality on the CBF less than 30% or the time to maximum enhancement greater than 6 seconds perfusion maps. However, there are some areas of diminished perfusion within the left occipital lobe on the time to maximum enhancement greater than 4 second maps measuring up to 13 cc that are within the left PCA distribution. The  findings are compatible with an area of low level diminished perfusion within the left occipital lobe. No prior perfusion study was performed and a new small acute infarct within the left occipital lobe could not be excluded. Further evaluation could be performed with an MRI of the brain for much more sensitive and specific area of acute infarction.  There is no significant interval change in the CT angiogram of the head and neck when compared to the prior study dated 08/16/2023. There is mild to moderate atherosclerotic irregularity and narrowing of the V4 segment of the right vertebral artery as well as the basilar artery. There is a moderate degree of stenosis within the origin of the right vertebral artery. Atherosclerotic changes are identified within the internal carotid arteries. However, there is no significant NASCET stenosis.  These findings were contemporaneously discussed with Dr. Kraus.  Radiation dose reduction techniques were utilized, including automated exposure control and exposure modulation based on body size.   This report was finalized on 8/19/2023 6:31 PM by Dr. Aniceto Deleon M.D.       Ordered the above noted radiological studies. Reviewed by me in PACS.            PROCEDURES  Critical Care  Performed by: Peter oGnzales MD  Authorized by: Peter Gonzales MD     Critical care provider statement:     Critical care time (minutes):  30    Critical care time was exclusive of:  Separately billable procedures and treating other patients    Critical care was necessary to treat or prevent imminent or life-threatening deterioration of the following conditions:  CNS failure or compromise    Critical care was time spent personally by me on the following activities:  Ordering and review of laboratory studies, ordering and review of radiographic studies, re-evaluation of patient's condition, review of old charts, examination of patient, evaluation of patient's response to treatment,  discussions with consultants, ordering and performing treatments and interventions and obtaining history from patient or surrogate            MEDICATIONS GIVEN IN ER  Medications   sodium chloride 0.9 % flush 10 mL (has no administration in time range)   sodium chloride 0.9 % infusion (has no administration in time range)   acetaminophen (TYLENOL) tablet 650 mg (has no administration in time range)     Or   acetaminophen (TYLENOL) suppository 650 mg (has no administration in time range)   aspirin tablet 325 mg (has no administration in time range)     Or   aspirin suppository 300 mg (has no administration in time range)   atorvastatin (LIPITOR) tablet 80 mg (has no administration in time range)   ondansetron (ZOFRAN) injection 4 mg (4 mg Intravenous Given 8/19/23 1926)   bisacodyl (DULCOLAX) suppository 10 mg (has no administration in time range)   iopamidol (ISOVUE-370) 76 % injection 150 mL (150 mL Intravenous Given by Other 8/19/23 1704)                   MEDICAL DECISION MAKING, PROGRESS, and CONSULTS    All labs have been independently reviewed by me.  All radiology studies have been reviewed by me and I have also reviewed the radiology report.   EKG's independently viewed and interpreted by me.  Discussion below represents my analysis of pertinent findings related to patient's condition, differential diagnosis, treatment plan and final disposition.      Additional sources:  - Discussed/ obtained information from independent historians: N/A    - External (non-ED) record review: Recent discharge summary reviewed and summarized below    - Chronic or social conditions impacting care: N/A    - Shared decision making: Patient informed of plan for admission for continued evaluation of his symptoms      Orders placed during this visit:  Orders Placed This Encounter   Procedures    Critical Care    CT Angiogram Head w AI Analysis of LVO    CT Angiogram Neck    CT CEREBRAL PERFUSION WITH & WITHOUT CONTRAST    XR Chest 1  View    MRI Brain Without Contrast    CT Angiogram Neck    [place] Draw    [place] Draw    Comprehensive Metabolic Panel    Protime-INR    aPTT    Single High Sensitivity Troponin T    CBC Auto Differential    CBC (No Diff)    Comprehensive Metabolic Panel    Hemoglobin A1c    Lipid Panel    TSH    NPO Diet NPO Type: Strict NPO    Initiate Department's Acute Stroke Process (Team D, Code 19, etc.)    Perform NIH Stroke Scale    Measure Actual Weight    Notify MD for SBP < 80 or > 200    Notify Provider for SBP greater than 140 if hemorrhagic stroke    Head of Bed 30 Degrees or Less    Undress and Gown    Vital Signs    Neuro Checks    No Hypotonic Fluids    Nursing Dysphagia Screening (Complete Prior to Giving anything PO)    RN to Place Order SLP Consult (IF swallow screen failed) - Eval & Treat Choosing Reason of RN Dysphagia Screen Failed    Vital Signs    Pulse Oximetry, Continuous    Telemetry - Maintain IV Access    Telemetry - Place Orders & Notify Provider of Results When Patient Experiences Acute Chest Pain, Dysrhythmia or Respiratory Distress    Notify physician of changes in level of consciousness, worsening of stroke symptoms, acute headache or severe nausea and vomiting or any of the following vital sign parameters:    Activity As Tolerated    Nursing Dysphagia Screening    RN to Place Order SLP Consult - Eval & Treat Choosing Reason of RN Dysphagia Screen Failed    Nurse to Call MD or Nutrition Services for Diet if Patient Passes Dysphagia Screen    Intake and Output    Neuro Checks    NIHSS Assessment    Order CT Head Without Contrast for Neurological Decline    Provide Stroke Education Material    Tobacco Cessation Education    Place Sequential Compression Device    Maintain Sequential Compression Device    Code Status and Medical Interventions:    Inpatient Neurology Consult Stroke    Inpatient Neurology Consult Stroke    Notify Stroke Coordinator    Inpatient Rehab Admission Consult    Consult to  Case Management     Consult to Diabetes Educator    Inpatient Neurology Consult Stroke    OT Consult: Eval & Treat    PT Consult: Eval & Treat as tolerated    Oxygen Therapy-    SLP Consult: Eval & Treat Communication Disorder    POC Glucose Once    POC Glucose Once    POC Creatinine    POC Glucose Q6H    ECG 12 Lead Stroke Evaluation    ECG 12 Lead Stroke Evaluation    Adult transthoracic echo complete    Type & Screen    Insert Large-Bore Peripheral IV - RIGHT AC Preferred    Initiate Observation Status    Initiate Observation Status    Fall Precautions    Green Top (Gel)    Lavender Top    Gold Top - SST    Light Blue Top    CBC & Differential    Green Top (Gel)    Lavender Top    Gold Top - SST    Light Blue Top           Differential diagnosis includes but is not limited to:    Hemorrhagic conversion of recent stroke  Acute ischemic stroke  Vertigo        Independent interpretation of labs, radiology studies, and discussions with consultants:  ED Course as of 08/19/23 1940   Sat Aug 19, 2023   1623 Record review: I reviewed discharge summary from earlier this morning at 9:58 AM.  Patient has a history of hypertension, hyperlipidemia and had presented with unsteady gait, dizziness and high blood pressure.  He was admitted to the observation unit and found to have 2 small cerebellar strokes on imaging.  Neurology recommended aspirin Plavix for 21 days followed by aspirin monotherapy.  CTA imaging demonstrated no evidence of cervical artery dissection or critical stenosis amenable to intervention. [JR]   1637 Discussed with Dr. Foster, stroke neurologist, who recommended proceeding with all stroke imaging.  Agreed patient not candidate for TNK due to recent stroke.  [JR]   1638 Patient presents with new onset ataxia and dizziness after being discharged from the hospital earlier this morning with diagnosis of cerebellar stroke.  He reports that he was ambulating satisfactorily in the hospital in his  room this morning.  He denies double vision reports his vision is a bit blurry in both eyes.  He has no focal weakness or sensory deficit in his extremities.  His speech is fluent and clear.  NIH stroke scale is currently 0. [JR]   1730 EKG          EKG time: 1722  Rhythm/Rate: Sinus rhythm, 67  P waves and MD: Normal  QRS, axis: Normal axis  ST and T waves: No acute ischemic changes    Interpreted Contemporaneously by me, independently viewed  Similar compared to prior 8/16/2023       [JR]   1755 CT brain without contrast independently interpreted in PACS demonstrates no acute intracranial hemorrhage. [JR]   1812 Discussed with Dr. Cooper, [facility], who agrees to admit. [JR]   1817 Discussed with Dr. Foster, stroke neurology, who reviewed the CTA, CT perfusion imaging and reports that it looks pretty similar to his recent visit.  Recommended admission for MRI. [JR]      ED Course User Index  [JR] Peter Gonzales MD               DIAGNOSIS  Final diagnoses:   Ataxia   History of cerebellar stroke         DISPOSITION  Admit            Latest Documented Vital Signs:  As of 19:40 EDT  BP- 152/97 HR- 66 Temp- 98.7 øF (37.1 øC) O2 sat- 94%              --    Please note that portions of this were completed with a voice recognition program.       Note Disclaimer: At [facility], we believe that sharing information builds trust and better relationships. You are receiving this note because you are receiving care at [facility] or recently visited. It is possible you will see health information before a provider has talked with you about it. This kind of information can be easy to misunderstand. To help you fully understand what it means for your health, we urge you to discuss this note with your provider.             Peter Gonzales MD  08/19/23 1941

## 2023-08-19 NOTE — ED NOTES
Nursing report ED to floor  Flako Coreas  58 y.o.  male    HPI :   Chief Complaint   Patient presents with    Dizziness       Admitting doctor:   Xena Cooper MD    Admitting diagnosis:   The primary encounter diagnosis was Ataxia. A diagnosis of History of cerebellar stroke was also pertinent to this visit.    Code status:   Current Code Status       Date Active Code Status Order ID Comments User Context       8/19/2023 1814 CPR (Attempt to Resuscitate) [id number]  Xena Cooper MD ED        Question Answer    Code Status (Patient has no pulse and is not breathing) CPR (Attempt to Resuscitate)    Medical Interventions (Patient has pulse or is breathing) Full                    Allergies:   Patient has no known allergies.    Isolation:   No active isolations    Intake and Output  No intake or output data in the 24 hours ending 08/19/23 1816    Weight:   There were no vitals filed for this visit.    Most recent vitals:   Vitals:    08/19/23 1712 08/19/23 1716 08/19/23 1731 08/19/23 1732   BP: 171/97 154/92 157/95    Pulse: 72 70 69 78   Resp:       Temp:       SpO2: 93% 94% 95% 94%       Active LDAs/IV Access:   Lines, Drains & Airways       Active LDAs       Name Placement date Placement time Site Days    Peripheral IV 08/19/23 1625 Left Antecubital 08/19/23  1625  Antecubital  less than 1    Peripheral IV 08/19/23 1632 Right Antecubital 08/19/23  1632  Antecubital  less than 1                    Labs (abnormal labs have a star):   Labs Reviewed   COMPREHENSIVE METABOLIC PANEL - Abnormal; Notable for the following components:       Result Value    Glucose 142 (*)     BUN 22 (*)     All other components within normal limits    Narrative:     GFR Normal >60  Chronic Kidney Disease <60  Kidney Failure <15     CBC WITH AUTO DIFFERENTIAL - Abnormal; Notable for the following components:    RBC 6.25 (*)     MCH 26.2 (*)     RDW 16.2 (*)     Eosinophil % 7.4 (*)     Eosinophils, Absolute 0.74 (*)      All other components within normal limits   POCT GLUCOSE FINGERSTICK - Abnormal; Notable for the following components:    Glucose 137 (*)     All other components within normal limits   PROTIME-INR - Normal   APTT - Normal   SINGLE HSTROPONIN T - Normal    Narrative:     High Sensitive Troponin T Reference Range:  <10.0 ng/L- Negative Female for AMI  <15.0 ng/L- Negative Male for AMI  >=10 - Abnormal Female indicating possible myocardial injury.  >=15 - Abnormal Male indicating possible myocardial injury.   Clinicians would have to utilize clinical acumen, EKG, Troponin, and serial changes to determine if it is an Acute Myocardial Infarction or myocardial injury due to an underlying chronic condition.        POCT CREATININE - Normal   RAINBOW DRAW    Narrative:     The following orders were created for panel order [place] Draw.  Procedure                               Abnormality         Status                     ---------                               -----------         ------                     Green Top (Gel)[[id number]]                                  Final result               Lavender Top[[id number]]                                     Final result               Gold Top - SST[[id number]]                                   Final result               Light Blue Top[[id number]]                                   Final result                 Please view results for these tests on the individual orders.   RAINBOW DRAW    Narrative:     The following orders were created for panel order [place] Draw.  Procedure                               Abnormality         Status                     ---------                               -----------         ------                     Green Top (Gel)[[id number]]                                                             Lavender Top[[id number]]                                                                Gold Top - SST[[id number]]                                                               Light Blue Top[[id number]]                                                                Please view results for these tests on the individual orders.   POCT GLUCOSE FINGERSTICK   POCT GLUCOSE FINGERSTICK   POCT GLUCOSE FINGERSTICK   POCT GLUCOSE FINGERSTICK   POCT GLUCOSE FINGERSTICK   TYPE AND SCREEN   GREEN TOP   LAVENDER TOP   GOLD TOP - SST   LIGHT BLUE TOP   CBC AND DIFFERENTIAL    Narrative:     The following orders were created for panel order CBC & Differential.  Procedure                               Abnormality         Status                     ---------                               -----------         ------                     CBC Auto Differential[[id number]]        Abnormal            Final result                 Please view results for these tests on the individual orders.   GREEN TOP   LAVENDER TOP   GOLD TOP - SST   LIGHT BLUE TOP       EKG:   ECG 12 Lead Stroke Evaluation   Preliminary Result   HEART RATE= 67  bpm   RR Interval= 896  ms   MN Interval= 170  ms   P Horizontal Axis= -54  deg   P Front Axis= 1  deg   QRSD Interval= 97  ms   QT Interval= 452  ms   QRS Axis= 45  deg   T Wave Axis= 42  deg   - BORDERLINE ECG -   Sinus rhythm   Borderline T wave abnormalities   Borderline prolonged QT interval   Electronically Signed By:    Date and Time of Study: 2023-08-19 17:22:24          Meds given in ED:   Medications   sodium chloride 0.9 % flush 10 mL (has no administration in time range)   sodium chloride 0.9 % infusion (has no administration in time range)   acetaminophen (TYLENOL) tablet 650 mg (has no administration in time range)     Or   acetaminophen (TYLENOL) suppository 650 mg (has no administration in time range)   aspirin tablet 325 mg (has no administration in time range)     Or   aspirin suppository 300 mg (has no administration in time range)   atorvastatin (LIPITOR) tablet 80 mg (has no administration in time range)   ondansetron (ZOFRAN) injection 4 mg (has no  administration in time range)   bisacodyl (DULCOLAX) suppository 10 mg (has no administration in time range)   iopamidol (ISOVUE-370) 76 % injection 150 mL (150 mL Intravenous Given by Other 8/19/23 4106)       Imaging results:  CT Head Without Contrast    Result Date: 8/18/2023   On the prior MRI of the brain dated 08/16/2023, there were 2 subcentimeter foci of acute to subacute infarction within the inferior medial aspect left cerebellar hemisphere within the left PICA distribution. These infarcts are not well delineated on this noncontrast head CT. However, no new intracranial abnormality is identified on this head CT and there is no evidence for hemorrhagic transformation at the sites of acute to subacute infarction.  Incidental note is made of mild changes of chronic small vessel ischemic phenomenon and old lacunar disease within the right thalamus.   Radiation dose reduction techniques were utilized, including automated exposure control and exposure modulation based on body size.    This report was finalized on 8/18/2023 3:25 PM by Dr. Aniceto Deleon M.D.       Ambulatory status:   - assist x1    Social issues:   Social History     Socioeconomic History    Marital status: Single   Tobacco Use    Smoking status: Never    Smokeless tobacco: Never   Vaping Use    Vaping Use: Never used   Substance and Sexual Activity    Alcohol use: Yes     Alcohol/week: 20.0 standard drinks     Types: 10 Cans of beer, 10 Shots of liquor per week     Comment: pt states very rarely    Drug use: Yes     Frequency: 1.0 times per week     Types: Marijuana     Comment: Pt states he may have smoked marijuana 3 weeks ago (stated on 11-07-18)    Sexual activity: Not Currently     Partners: Female       NIH Stroke Scale:  Interval: baseline    Rosa Fontaine RN  08/19/23 18:16 EDT

## 2023-08-19 NOTE — NURSING NOTE
Patient discharged and holter monitor was not placed. JANEEN Person called office and set up for office to call the patient. Nurse called the patient and informed him of Cardiology office number and that they would call him to place a holter monitor.

## 2023-08-19 NOTE — Clinical Note
Level of Care: Telemetry [5]   Diagnosis: Ataxia [[id number]]   Admitting Physician: BIBIANA SMITH [[id number]]   Attending Physician: BIBIANA SMITH [[id number]]

## 2023-08-19 NOTE — SIGNIFICANT NOTE
08/19/23 1043   OTHER   Discipline physical therapist   Therapy Assessment/Plan (PT)   Criteria for Skilled Interventions Met (PT) no problems identified which require skilled intervention  (Pt previously evaled by PT with sign off, no changes since evaluation and is to d/c home today. Re-consult if status changes.)

## 2023-08-19 NOTE — ED TRIAGE NOTES
Patient to ER via car from home for unsteady gait trouble speaking and sweating  Patient was just d/c from hospital 3 hours ago for stroke

## 2023-08-19 NOTE — NURSING NOTE
Nurse discharged the patient after reviewing all care and discharge instructions. Stroke s's reviewed and what to do if s/s appear. Reviewed stroke booklet and medications. Wheelchair escort was called to take to discharge lounge.

## 2023-08-19 NOTE — TELEPHONE ENCOUNTER
I am on call. Yoselyn RN,informed me that patient was supposed to be discharged from the hospital today on a 14-day monitor.  Unfortunately, this was not completed.  We will need to arrange for this to be done outpatient in the office this week.

## 2023-08-19 NOTE — PROGRESS NOTES
Name: Flako Coreas ADMIT: 2023   : 1965  PCP: Akash Rodriguez Jr., DO    MRN: [id number] LOS: 0 days   AGE/SEX: 58 y.o. male  ROOM: [facility]     Subjective   Subjective     He is still dizzy with standing.   Worked with PT  Adjustment in BP meds  Had recently run out       Objective   Objective   Vital Signs  Temp:  [97.7 øF (36.5 øC)-99.5 øF (37.5 øC)] 98.4 øF (36.9 øC)  Heart Rate:  [63-89] 63  Resp:  [16-18] 17  BP: (143-177)/() 159/99  SpO2:  [94 %-98 %] 94 %  on   ;   Device (Oxygen Therapy): room air  Body mass index is 30.47 kg/mý.  Physical Exam  Vitals reviewed.   Constitutional:       Appearance: He is well-developed. He is ill-appearing.   HENT:      Head: Normocephalic and atraumatic.      Mouth/Throat:      Mouth: Mucous membranes are moist.   Cardiovascular:      Rate and Rhythm: Normal rate and regular rhythm.   Pulmonary:      Effort: Pulmonary effort is normal. No respiratory distress.      Breath sounds: Normal breath sounds.   Abdominal:      General: Bowel sounds are normal. There is no distension.      Palpations: Abdomen is soft.      Tenderness: There is no abdominal tenderness.   Skin:     General: Skin is warm and dry.   Neurological:      Mental Status: He is alert and oriented to person, place, and time.   Psychiatric:         Mood and Affect: Mood normal.         Behavior: Behavior normal.     Results Review     I reviewed the patient's new clinical results.  Results from last 7 days   Lab Units 23  0531 23  0434 23  1458   WBC 10*3/mm3 6.95 8.26 9.02   HEMOGLOBIN g/dL 14.3 14.5 15.2   PLATELETS 10*3/mm3 221 233 275     Results from last 7 days   Lab Units 23  0531 23  0434 23  1458   SODIUM mmol/L 142 141 139   POTASSIUM mmol/L 3.8 3.9 3.8   CHLORIDE mmol/L 106 107 104   CO2 mmol/L 22.5 21.7* 19.9*   BUN mg/dL 16 13 15   CREATININE mg/dL 1.00 0.88 1.09   GLUCOSE mg/dL 110* 96 124*   EGFR mL/min/1.73 88.3 100.9 79.7      Results from last 7 days   Lab Units 08/16/23  1458   ALBUMIN g/dL 4.8   BILIRUBIN mg/dL 0.2   ALK PHOS U/L 74   AST (SGOT) U/L 13   ALT (SGPT) U/L 23     Results from last 7 days   Lab Units 08/18/23  0531 08/17/23  0434 08/16/23  1458   CALCIUM mg/dL 9.2 8.8 9.9   ALBUMIN g/dL  --   --  4.8   MAGNESIUM mg/dL  --   --  2.4       Hemoglobin A1C   Date/Time Value Ref Range Status   08/16/2023 1458 5.20 4.80 - 5.60 % Final     Glucose   Date/Time Value Ref Range Status   08/17/2023 2044 99 70 - 130 mg/dL Final   08/17/2023 1118 103 70 - 130 mg/dL Final   08/16/2023 1504 113 70 - 130 mg/dL Final       CT Head Without Contrast    Result Date: 8/18/2023   On the prior MRI of the brain dated 08/16/2023, there were 2 subcentimeter foci of acute to subacute infarction within the inferior medial aspect left cerebellar hemisphere within the left PICA distribution. These infarcts are not well delineated on this noncontrast head CT. However, no new intracranial abnormality is identified on this head CT and there is no evidence for hemorrhagic transformation at the sites of acute to subacute infarction.  Incidental note is made of mild changes of chronic small vessel ischemic phenomenon and old lacunar disease within the right thalamus.   Radiation dose reduction techniques were utilized, including automated exposure control and exposure modulation based on body size.    This report was finalized on 8/18/2023 3:25 PM by Dr. Aniceto Deleon M.D.       I have personally reviewed all medications:  Scheduled Medications  amLODIPine, 5 mg, Oral, Daily  aspirin, 81 mg, Oral, Daily  atorvastatin, 80 mg, Oral, Nightly  carvedilol, 6.25 mg, Oral, BID With Meals  clopidogrel, 75 mg, Oral, Daily  hydrALAZINE, 100 mg, Oral, Q8H  lisinopril, 40 mg, Oral, Q24H  sodium chloride, 10 mL, Intravenous, Q12H  sodium chloride, 10 mL, Intravenous, Q12H  spironolactone, 25 mg, Oral, Daily  vilazodone, 40 mg, Oral, Daily    Infusions   Diet  Diet:  Diabetic Diets; Consistent Carbohydrate; Texture: Regular Texture (IDDSI 7); Fluid Consistency: Thin (IDDSI 0)  Results from last 7 days   Lab Units 08/16/23  1458   CHOLESTEROL mg/dL 194   TRIGLYCERIDES mg/dL 194*   HDL CHOL mg/dL 41   LDL CHOL mg/dL 119*       I have personally reviewed:  [x]  Laboratory   [x]  Microbiology   [x]  Radiology   [x]  EKG/Telemetry  [x]  Cardiology/Vascular   []  Pathology    []  Records       Assessment/Plan     Active Hospital Problems    Diagnosis  POA    **Acute CVA (cerebrovascular accident) [I63.9]  Yes    Hypertensive emergency [I16.1]  Yes    Dizziness [R42]  Yes    GERD (gastroesophageal reflux disease) [K21.9]  Yes    Mixed hyperlipidemia [E78.2]  Yes    Essential hypertension [I10]  Yes      Resolved Hospital Problems   No resolved problems to display.       58 y.o. male admitted with Acute CVA (cerebrovascular accident) and ataxia that was preceded by vomiting and  nausea which has resolved but still dizzy. BP elevated at home 180/100s and up to 245/154 in observation  CTA head and neck, MRI brain with left cerebellum infarcts     CVA left cerebella    Dizziness/ ataxia  Neurology following.   Likely related to uncontrolled HTN  ASA Plavix   PT OT      HTN emergency  Cardioligy following  At home Poorly controlled 180/100 in ER obs up to  245/154.   Adjust medication including add coreg.     Hypertriglyceridemia/ HLD  Trigs 194,     Continue high-dose statin        DVT prophylaxis.  Full code.  Discussed with patient.  Anticipate discharge home with home health tomorrow..    ILYA RN  ILYA Neurology APRN    Gabe Marsh MD  [facility]  08/18/23  09:58 EDT

## 2023-08-20 ENCOUNTER — READMISSION MANAGEMENT (OUTPATIENT)
Dept: CALL CENTER | Facility: HOSPITAL | Age: 58
End: 2023-08-20
Payer: COMMERCIAL

## 2023-08-20 ENCOUNTER — APPOINTMENT (OUTPATIENT)
Dept: MRI IMAGING | Facility: HOSPITAL | Age: 58
DRG: 057 | End: 2023-08-20
Payer: COMMERCIAL

## 2023-08-20 LAB
ALBUMIN SERPL-MCNC: 3.8 G/DL (ref 3.5–5.2)
ALBUMIN/GLOB SERPL: 1.7 G/DL
ALP SERPL-CCNC: 62 U/L (ref 39–117)
ALT SERPL W P-5'-P-CCNC: 17 U/L (ref 1–41)
AMPHET+METHAMPHET UR QL: NEGATIVE
ANION GAP SERPL CALCULATED.3IONS-SCNC: 9.9 MMOL/L (ref 5–15)
AST SERPL-CCNC: 9 U/L (ref 1–40)
BARBITURATES UR QL SCN: NEGATIVE
BENZODIAZ UR QL SCN: NEGATIVE
BILIRUB SERPL-MCNC: 0.4 MG/DL (ref 0–1.2)
BUN SERPL-MCNC: 20 MG/DL (ref 6–20)
BUN/CREAT SERPL: 18.5 (ref 7–25)
CALCIUM SPEC-SCNC: 8.8 MG/DL (ref 8.6–10.5)
CANNABINOIDS SERPL QL: POSITIVE
CHLORIDE SERPL-SCNC: 109 MMOL/L (ref 98–107)
CHOLEST SERPL-MCNC: 117 MG/DL (ref 0–200)
CO2 SERPL-SCNC: 22.1 MMOL/L (ref 22–29)
COCAINE UR QL: NEGATIVE
CREAT SERPL-MCNC: 1.08 MG/DL (ref 0.76–1.27)
DEPRECATED RDW RBC AUTO: 46.1 FL (ref 37–54)
EGFRCR SERPLBLD CKD-EPI 2021: 80.5 ML/MIN/1.73
ERYTHROCYTE [DISTWIDTH] IN BLOOD BY AUTOMATED COUNT: 15.8 % (ref 12.3–15.4)
ETHANOL BLD-MCNC: <10 MG/DL (ref 0–10)
ETHANOL UR QL: <0.01 %
FENTANYL UR-MCNC: NEGATIVE NG/ML
GLOBULIN UR ELPH-MCNC: 2.3 GM/DL
GLUCOSE BLDC GLUCOMTR-MCNC: 108 MG/DL (ref 70–130)
GLUCOSE SERPL-MCNC: 94 MG/DL (ref 65–99)
HCT VFR BLD AUTO: 43.8 % (ref 37.5–51)
HDLC SERPL-MCNC: 34 MG/DL (ref 40–60)
HGB BLD-MCNC: 14.4 G/DL (ref 13–17.7)
LDLC SERPL CALC-MCNC: 62 MG/DL (ref 0–100)
LDLC/HDLC SERPL: 1.76 {RATIO}
MCH RBC QN AUTO: 26.6 PG (ref 26.6–33)
MCHC RBC AUTO-ENTMCNC: 32.9 G/DL (ref 31.5–35.7)
MCV RBC AUTO: 80.8 FL (ref 79–97)
METHADONE UR QL SCN: NEGATIVE
OPIATES UR QL: NEGATIVE
OXYCODONE UR QL SCN: NEGATIVE
PA ADP PRP-ACNC: 171 PRU (ref 194–418)
PLATELET # BLD AUTO: 221 10*3/MM3 (ref 140–450)
PMV BLD AUTO: 10.2 FL (ref 6–12)
POTASSIUM SERPL-SCNC: 4 MMOL/L (ref 3.5–5.2)
PROT SERPL-MCNC: 6.1 G/DL (ref 6–8.5)
QT INTERVAL: 452 MS
RBC # BLD AUTO: 5.42 10*6/MM3 (ref 4.14–5.8)
SODIUM SERPL-SCNC: 141 MMOL/L (ref 136–145)
TRIGL SERPL-MCNC: 115 MG/DL (ref 0–150)
TSH SERPL DL<=0.05 MIU/L-ACNC: 0.36 UIU/ML (ref 0.27–4.2)
VLDLC SERPL-MCNC: 21 MG/DL (ref 5–40)
WBC NRBC COR # BLD: 7.35 10*3/MM3 (ref 3.4–10.8)

## 2023-08-20 PROCEDURE — G0378 HOSPITAL OBSERVATION PER HR: HCPCS

## 2023-08-20 PROCEDURE — 80307 DRUG TEST PRSMV CHEM ANLYZR: CPT | Performed by: HOSPITALIST

## 2023-08-20 PROCEDURE — 80050 GENERAL HEALTH PANEL: CPT | Performed by: INTERNAL MEDICINE

## 2023-08-20 PROCEDURE — 97162 PT EVAL MOD COMPLEX 30 MIN: CPT

## 2023-08-20 PROCEDURE — 97166 OT EVAL MOD COMPLEX 45 MIN: CPT

## 2023-08-20 PROCEDURE — 85576 BLOOD PLATELET AGGREGATION: CPT | Performed by: STUDENT IN AN ORGANIZED HEALTH CARE EDUCATION/TRAINING PROGRAM

## 2023-08-20 PROCEDURE — 82077 ASSAY SPEC XCP UR&BREATH IA: CPT | Performed by: HOSPITALIST

## 2023-08-20 PROCEDURE — 97530 THERAPEUTIC ACTIVITIES: CPT

## 2023-08-20 PROCEDURE — 70551 MRI BRAIN STEM W/O DYE: CPT

## 2023-08-20 PROCEDURE — 25010000002 LORAZEPAM PER 2 MG: Performed by: NURSE PRACTITIONER

## 2023-08-20 PROCEDURE — 25010000002 ONDANSETRON PER 1 MG: Performed by: INTERNAL MEDICINE

## 2023-08-20 PROCEDURE — 80061 LIPID PANEL: CPT | Performed by: INTERNAL MEDICINE

## 2023-08-20 PROCEDURE — 82948 REAGENT STRIP/BLOOD GLUCOSE: CPT

## 2023-08-20 PROCEDURE — 99222 1ST HOSP IP/OBS MODERATE 55: CPT | Performed by: STUDENT IN AN ORGANIZED HEALTH CARE EDUCATION/TRAINING PROGRAM

## 2023-08-20 PROCEDURE — 25010000002 THIAMINE HCL 200 MG/2ML SOLUTION: Performed by: NURSE PRACTITIONER

## 2023-08-20 RX ORDER — ASPIRIN 81 MG/1
81 TABLET, CHEWABLE ORAL DAILY
Status: DISCONTINUED | OUTPATIENT
Start: 2023-08-20 | End: 2023-08-24 | Stop reason: HOSPADM

## 2023-08-20 RX ORDER — ATORVASTATIN CALCIUM 20 MG/1
40 TABLET, FILM COATED ORAL NIGHTLY
Status: DISCONTINUED | OUTPATIENT
Start: 2023-08-20 | End: 2023-08-24 | Stop reason: HOSPADM

## 2023-08-20 RX ADMIN — THIAMINE HYDROCHLORIDE 200 MG: 100 INJECTION, SOLUTION INTRAMUSCULAR; INTRAVENOUS at 22:04

## 2023-08-20 RX ADMIN — HYDROXYZINE HYDROCHLORIDE 25 MG: 25 TABLET ORAL at 19:54

## 2023-08-20 RX ADMIN — ONDANSETRON 4 MG: 2 INJECTION INTRAMUSCULAR; INTRAVENOUS at 19:55

## 2023-08-20 RX ADMIN — MULTIPLE VITAMINS W/ MINERALS TAB 1 TABLET: TAB at 08:45

## 2023-08-20 RX ADMIN — PANTOPRAZOLE SODIUM 40 MG: 40 TABLET, DELAYED RELEASE ORAL at 08:46

## 2023-08-20 RX ADMIN — HYDRALAZINE HYDROCHLORIDE 100 MG: 50 TABLET, FILM COATED ORAL at 22:03

## 2023-08-20 RX ADMIN — AMLODIPINE BESYLATE 5 MG: 5 TABLET ORAL at 08:45

## 2023-08-20 RX ADMIN — CARVEDILOL 6.25 MG: 6.25 TABLET, FILM COATED ORAL at 17:00

## 2023-08-20 RX ADMIN — ASPIRIN 81 MG: 81 TABLET, CHEWABLE ORAL at 13:01

## 2023-08-20 RX ADMIN — LISINOPRIL 40 MG: 40 TABLET ORAL at 08:44

## 2023-08-20 RX ADMIN — CLOPIDOGREL BISULFATE 75 MG: 75 TABLET, FILM COATED ORAL at 08:45

## 2023-08-20 RX ADMIN — SPIRONOLACTONE 25 MG: 25 TABLET ORAL at 08:45

## 2023-08-20 RX ADMIN — CARVEDILOL 6.25 MG: 6.25 TABLET, FILM COATED ORAL at 08:45

## 2023-08-20 RX ADMIN — PANTOPRAZOLE SODIUM 40 MG: 40 TABLET, DELAYED RELEASE ORAL at 17:00

## 2023-08-20 RX ADMIN — HYDRALAZINE HYDROCHLORIDE 100 MG: 50 TABLET, FILM COATED ORAL at 13:01

## 2023-08-20 RX ADMIN — ASPIRIN 325 MG: 325 TABLET ORAL at 08:45

## 2023-08-20 RX ADMIN — THIAMINE HYDROCHLORIDE 200 MG: 100 INJECTION, SOLUTION INTRAMUSCULAR; INTRAVENOUS at 02:26

## 2023-08-20 RX ADMIN — LORAZEPAM 1 MG: 2 INJECTION INTRAMUSCULAR; INTRAVENOUS at 23:32

## 2023-08-20 RX ADMIN — THIAMINE HYDROCHLORIDE 200 MG: 100 INJECTION, SOLUTION INTRAMUSCULAR; INTRAVENOUS at 13:01

## 2023-08-20 RX ADMIN — SODIUM CHLORIDE 75 ML/HR: 9 INJECTION, SOLUTION INTRAVENOUS at 13:01

## 2023-08-20 RX ADMIN — HYDROXYZINE HYDROCHLORIDE 25 MG: 25 TABLET ORAL at 12:20

## 2023-08-20 RX ADMIN — FOLIC ACID 1 MG: 1 TABLET ORAL at 08:45

## 2023-08-20 RX ADMIN — VILAZODONE HYDROCHLORIDE 40 MG: 40 TABLET, FILM COATED ORAL at 08:45

## 2023-08-20 RX ADMIN — SODIUM CHLORIDE 75 ML/HR: 9 INJECTION, SOLUTION INTRAVENOUS at 13:02

## 2023-08-20 RX ADMIN — HYDRALAZINE HYDROCHLORIDE 100 MG: 50 TABLET, FILM COATED ORAL at 06:15

## 2023-08-20 RX ADMIN — ATORVASTATIN CALCIUM 40 MG: 20 TABLET, FILM COATED ORAL at 20:00

## 2023-08-20 NOTE — OUTREACH NOTE
Stroke Week 1 Survey      Flowsheet Row Responses   [facility] facility patient discharged from? [place]   Does the patient have one of the following disease processes/diagnoses(primary or secondary)? Stroke   Week 1 attempt successful? No   Unsuccessful attempts Attempt 3   Revoke Readmitted            Loni MASSEY - Registered Nurse

## 2023-08-20 NOTE — H&P
HISTORY AND PHYSICAL   [facility]        Date of Admission: 2023  Patient Identification:  Name: Flako Coreas  Age: 58 y.o.  Sex: male  :  1965  MRN: [id number]                     Primary Care Physician: Akash Rodriguez Jr., DO    Chief Complaint:  58 year old gentleman who was discharged earlier today after he was diagnosed with a stroke; he also had uncontrolled hypertension; after arriving at home the patient continued to have dizziness, difficulty walking and falls; he has had blurred vision but no double vision; no fever or chills    History of Present Illness:   As above    Past Medical History:  Past Medical History:   Diagnosis Date    ADHD (attention deficit hyperactivity disorder)     On going issue    Anxiety     Lepe's esophagus     Benign prostatic hyperplasia Surgery in 2021    Clotting disorder Intestinal    Depression     Diverticulitis     Diverticulosis     Duodenitis     Enteritis     Failure to thrive (child)     Family history of blood clots     GERD (gastroesophageal reflux disease)     Hyperlipidemia     Hypertension     Hypertensive emergency     Low testosterone     Microcytosis     Pancreatitis     Pneumonia     PONV (postoperative nausea and vomiting)     Seasonal affective disorder     Shoulder injury     Stroke     Unexplained weight loss      Past Surgical History:  Past Surgical History:   Procedure Laterality Date    COLON SURGERY      COLONOSCOPY      COLONOSCOPY N/A 2022    Procedure: COLONOSCOPY INTO CECUM WITH HOT SNARE POLYPECTOMY;  Surgeon: Albert Gallo MD;  Location: [facility] ENDOSCOPY;  Service: Gastroenterology;  Laterality: N/A;  PRE- GI BLEED  POST- DIVERTICULOSIS, POLYP    ENDOSCOPY N/A 12/10/2022    Procedure: ESOPHAGOGASTRODUODENOSCOPY;  Surgeon: Albert Gallo MD;  Location: [facility] ENDOSCOPY;  Service: Gastroenterology;  Laterality: N/A;  PRE- DARK STOOLS  POST- BARRETTS ESOPHAGUS    FRACTURE SURGERY      for  broken arm    FRACTURE SURGERY      for broken hand    INCISION AND DRAINAGE ABSCESS  2015    anal    PROSTATE SURGERY      SMALL INTESTINE SURGERY      TONSILLECTOMY        Home Meds:  Medications Prior to Admission   Medication Sig Dispense Refill Last Dose    aspirin 81 MG EC tablet Take 1 tablet by mouth Daily. 90 tablet 0 8/19/2023 at 1400    atorvastatin (LIPITOR) 80 MG tablet Take 1 tablet by mouth Every Night. 90 tablet 0 8/19/2023    carvedilol (COREG) 6.25 MG tablet Take 1 tablet by mouth 2 (Two) Times a Day With Meals. 60 tablet 0 8/19/2023    clopidogrel (PLAVIX) 75 MG tablet Take 1 tablet by mouth Daily for 21 days. 21 tablet 0 8/19/2023    hydrALAZINE (APRESOLINE) 100 MG tablet Take 1 tablet by mouth Every 8 (Eight) Hours. 90 tablet 0 8/19/2023 at 1400    hydrOXYzine (ATARAX) 25 MG tablet Take 1 tablet by mouth 3 (Three) Times a Day As Needed for Anxiety. 60 tablet 0 8/19/2023    lisinopril (PRINIVIL,ZESTRIL) 40 MG tablet Take 1 tablet by mouth Daily. 30 tablet 0 8/19/2023    ondansetron (ZOFRAN) 8 MG tablet Take 1 tablet by mouth Every 8 (Eight) Hours As Needed for Vomiting or Nausea. 30 tablet 0 8/19/2023    spironolactone (ALDACTONE) 25 MG tablet Take 1 tablet by mouth Daily. 30 tablet 0 8/19/2023    vilazodone (Viibryd) 40 MG tablet tablet Take 1 tablet by mouth Daily. 90 tablet 1 8/19/2023    amitriptyline (ELAVIL) 25 MG tablet Take 1 tablet by mouth At Night As Needed for Sleep.   Unknown    amLODIPine (NORVASC) 5 MG tablet Take 1 tablet by mouth Daily. 90 tablet 0 Unknown    pantoprazole (PROTONIX) 40 MG EC tablet Take 1 tablet by mouth 2 (Two) Times a Day Before Meals. 60 tablet 0 Unknown    sucralfate (CARAFATE) 1 g tablet Take 1 tablet by mouth 4 (Four) Times a Day. 20 tablet 0 Unknown       Allergies:  No Known Allergies  Immunizations:  Immunization History   Administered Date(s) Administered    COVID-19 (PFIZER) Purple Cap Monovalent 03/09/2021, 12/29/2021    Covid-19 (Pfizer) Gray Cap  Monovalent 2022    Fluzone >6mos 2022    Influenza TIV (IM) 2018    Influenza, Unspecified 2018, 2018    Tdap 2021     Social History:   Social History     Social History Narrative    Not on file     Social History     Socioeconomic History    Marital status: Single   Tobacco Use    Smoking status: Never    Smokeless tobacco: Never   Vaping Use    Vaping Use: Never used   Substance and Sexual Activity    Alcohol use: Yes     Alcohol/week: 20.0 standard drinks     Types: 10 Cans of beer, 10 Shots of liquor per week     Comment: pt states very rarely    Drug use: Yes     Frequency: 1.0 times per week     Types: Marijuana     Comment: Pt states he may have smoked marijuana 3 weeks ago (stated on 18)    Sexual activity: Not Currently     Partners: Female       Family History:  Family History   Problem Relation Age of Onset    Stroke Mother     Stroke Father         Review of Systems  See history of present illness and past medical history.  Patient denies headache, d syncope, trauma, change in vision, change in hearing, change in taste, changes in weight, changes in appetite, focal weakness, numbness, or paresthesia.  Patient denies chest pain, palpitations, dyspnea, orthopnea, PND, cough, sinus pressure, rhinorrhea, epistaxis, hemoptysis, nausea, vomiting,hematemesis, diarrhea, constipation or hematochezia.  Denies cold or heat intolerance, polydipsia, polyuria, polyphagia. Denies hematuria, pyuria, dysuria, hesitancy, frequency or urgency. Denies consumption of raw and under cooked meats foods or change in water source.  Denies fever, chills, sweats, night sweats.       Objective:  T Max 24 hrs: Temp (24hrs), Av.2 øF (36.8 øC), Min:97.7 øF (36.5 øC), Max:98.7 øF (37.1 øC)    Vitals Ranges:   Temp:  [97.7 øF (36.5 øC)-98.7 øF (37.1 øC)] 98 øF (36.7 øC)  Heart Rate:  [59-95] 62  Resp:  [17-18] 18  BP: (141-182)/() 175/119      Exam:  BP (!) 175/119 (BP Location: Right  "arm, Patient Position: Lying)   Pulse 62   Temp 98 øF (36.7 øC) (Oral)   Resp 18   Ht 187.9 cm (73.98\")   Wt 102 kg (225 lb)   SpO2 94%   BMI 28.91 kg/mý     General Appearance:    Alert, cooperative, no distress, appears stated age   Head:    Normocephalic, without obvious abnormality, atraumatic   Eyes:    PERRL, conjunctivae/corneas clear, EOM's intact, both eyes   Ears:    Normal external ear canals, both ears   Nose:   Nares normal, septum midline, mucosa normal, no drainage    or sinus tenderness   Throat:   Lips, mucosa, and tongue normal   Neck:   Supple, symmetrical, trachea midline, no adenopathy;     thyroid:  no enlargement/tenderness/nodules; no carotid    bruit or JVD   Back:     Symmetric, no curvature, ROM normal, no CVA tenderness   Lungs:     Clear to auscultation bilaterally, respirations unlabored   Chest Wall:    No tenderness or deformity    Heart:    Regular rate and rhythm, S1 and S2 normal, no murmur, rub   or gallop   Abdomen:     Soft, nontender, bowel sounds active all four quadrants,     no masses, no hepatomegaly, no splenomegaly   Extremities:   Extremities normal, atraumatic, no cyanosis or edema   Pulses:   2+ and symmetric all extremities   Skin:   Skin color, texture, turgor normal, no rashes or lesions   Lymph nodes:   Cervical, supraclavicular, and axillary nodes normal   Neurologic:   CNII-XII intact, normal strength, sensation intact throughout      .    Data Review:  Labs in chart were reviewed.  WBC   Date Value Ref Range Status   08/19/2023 10.04 3.40 - 10.80 10*3/mm3 Final     Hemoglobin   Date Value Ref Range Status   08/19/2023 16.4 13.0 - 17.7 g/dL Final     Hematocrit   Date Value Ref Range Status   08/19/2023 49.9 37.5 - 51.0 % Final     Platelets   Date Value Ref Range Status   08/19/2023 300 140 - 450 10*3/mm3 Final     Sodium   Date Value Ref Range Status   08/19/2023 144 136 - 145 mmol/L Final     Potassium   Date Value Ref Range Status   08/19/2023 4.1 3.5 " - 5.2 mmol/L Final     Chloride   Date Value Ref Range Status   08/19/2023 105 98 - 107 mmol/L Final     CO2   Date Value Ref Range Status   08/19/2023 25.0 22.0 - 29.0 mmol/L Final     BUN   Date Value Ref Range Status   08/19/2023 22 (H) 6 - 20 mg/dL Final     Creatinine   Date Value Ref Range Status   08/19/2023 1.20 0.60 - 1.30 mg/dL Final     Comment:     Serial Number: [id number]:  [id number]   08/19/2023 1.22 0.76 - 1.27 mg/dL Final     Glucose   Date Value Ref Range Status   08/19/2023 142 (H) 65 - 99 mg/dL Final     Calcium   Date Value Ref Range Status   08/19/2023 10.3 8.6 - 10.5 mg/dL Final     AST (SGOT)   Date Value Ref Range Status   08/19/2023 18 1 - 40 U/L Final     ALT (SGPT)   Date Value Ref Range Status   08/19/2023 24 1 - 41 U/L Final     Alkaline Phosphatase   Date Value Ref Range Status   08/19/2023 91 39 - 117 U/L Final       Results from last 7 days   Lab Units 08/18/23  0531   TSH uIU/mL 0.288   FREE T4 ng/dL 1.39     Results from last 7 days   Lab Units 08/16/23  1458   HEMOGLOBIN A1C % 5.20      Imaging Results (All)       Procedure Component Value Units Date/Time    XR Chest 1 View [[id number]] Collected: 08/19/23 1831     Updated: 08/19/23 1905    Narrative:      STAT PORTABLE RADIOGRAPHIC VIEW OF THE CHEST     CLINICAL HISTORY: Acute stroke protocol.     FINDINGS:     The lungs are clear of acute infiltrates. The cardiomediastinal  silhouette is within normal limits. The osseous structures are  unremarkable.       Impression:         No active disease in the chest.     This report was finalized on 8/19/2023 7:02 PM by Dr. Aniceto Deleon M.D.       CT Angiogram Head w AI Analysis of LVO [[id number]] Collected: 08/19/23 1829     Updated: 08/19/23 1834    Narrative:      CT ANGIOGRAM OF THE HEAD AND NECK AND CT PERFUSION STUDY     CLINICAL HISTORY: Difficulty standing up. Recent cerebellar infarct.     TECHNIQUE: A noncontrast head CT was performed with 3 mm axial images.  Thereafter, a CT  perfusion study was performed after the dynamic bolus  of IV contrast. Standard perfusion maps were constructed with RAPID  software. A CT angiogram of the head and neck was then performed with 1  mm axial images. Sagittal, coronal, and 3-dimensional reconstructed  images were obtained. Finally, a delayed postcontrast head CT was  performed with 3 mm axial images. AI analysis of LVO was utilized.     COMPARISON: CT angiogram of the head and neck dated 08/16/2023 and CT  head dated 08/18/2023.     FINDINGS:     NONCONTRAST HEAD CT: On the previous MRI of the brain dated 08/16/2023,  there were 2 small foci of acute infarction noted within the inferior  aspect of the left cerebellar hemisphere within the left PICA  distribution. These are not well delineated on this CT examination.  There is no convincing new area of acute infarction on the head CT.     Incidental note is made of partial opacification of the left mastoid air  cells which was also seen on the prior head CT dated 08/18/2023.     CT PERFUSION STUDY: There is no abnormality in the CBF less than 30% or  the time to maximal enhancement greater than 6 second maps. However,  there are some areas of diminished perfusion within the left occipital  lobe on the time to maximum enhancement greater than 4 second maps and  measures up to 13 cc.     CTA NECK: There is a classic configuration of the aortic arch. There is  no significant great vessel origin stenosis. A mild degree of stenosis  is appreciated at the origin of the dominant right vertebral artery.  Atherosclerotic changes are identified within the carotid bifurcations  and proximal internal carotids. However, strictly speaking, there is no  significant NASCET stenosis. Again, no significant interval change is  seen in the CT angiogram of the neck when compared to the prior study  dated 08/16/2023.     CTA HEAD: There is mild to moderate atherosclerotic irregularity and  narrowing noted within the V4  segment of the right vertebral artery as  well as the basilar artery. The basilar artery is unremarkable in  appearance. The posterior cerebral arteries are unremarkable.  Atherosclerotic calcifications are appreciated within the carotid  siphons resulting in only mild degrees of luminal compromise. The middle  and anterior cerebral arteries are unremarkable in appearance. When  compared to the prior CT angiogram of the head, there is no significant  interval change     DELAYED POSTCONTRAST HEAD CT: No abnormal foci of contrast enhancement  are identified within the brain parenchyma.       Impression:      There is no abnormality on the CBF less than 30% or the time  to maximum enhancement greater than 6 seconds perfusion maps. However,  there are some areas of diminished perfusion within the left occipital  lobe on the time to maximum enhancement greater than 4 second maps  measuring up to 13 cc that are within the left PCA distribution. The  findings are compatible with an area of low level diminished perfusion  within the left occipital lobe. No prior perfusion study was performed  and a new small acute infarct within the left occipital lobe could not  be excluded. Further evaluation could be performed with an MRI of the  brain for much more sensitive and specific area of acute infarction.     There is no significant interval change in the CT angiogram of the head  and neck when compared to the prior study dated 08/16/2023. There is  mild to moderate atherosclerotic irregularity and narrowing of the V4  segment of the right vertebral artery as well as the basilar artery.  There is a moderate degree of stenosis within the origin of the right  vertebral artery. Atherosclerotic changes are identified within the  internal carotid arteries. However, there is no significant NASCET  stenosis.     These findings were contemporaneously discussed with Dr. Kraus.      Radiation dose reduction techniques were utilized,  including automated  exposure control and exposure modulation based on body size.        This report was finalized on 8/19/2023 6:31 PM by Dr. Aniceto Deleon M.D.       CT Angiogram Neck [[id number]] Collected: 08/19/23 1829     Updated: 08/19/23 1834    Narrative:      CT ANGIOGRAM OF THE HEAD AND NECK AND CT PERFUSION STUDY     CLINICAL HISTORY: Difficulty standing up. Recent cerebellar infarct.     TECHNIQUE: A noncontrast head CT was performed with 3 mm axial images.  Thereafter, a CT perfusion study was performed after the dynamic bolus  of IV contrast. Standard perfusion maps were constructed with RAPID  software. A CT angiogram of the head and neck was then performed with 1  mm axial images. Sagittal, coronal, and 3-dimensional reconstructed  images were obtained. Finally, a delayed postcontrast head CT was  performed with 3 mm axial images. AI analysis of LVO was utilized.     COMPARISON: CT angiogram of the head and neck dated 08/16/2023 and CT  head dated 08/18/2023.     FINDINGS:     NONCONTRAST HEAD CT: On the previous MRI of the brain dated 08/16/2023,  there were 2 small foci of acute infarction noted within the inferior  aspect of the left cerebellar hemisphere within the left PICA  distribution. These are not well delineated on this CT examination.  There is no convincing new area of acute infarction on the head CT.     Incidental note is made of partial opacification of the left mastoid air  cells which was also seen on the prior head CT dated 08/18/2023.     CT PERFUSION STUDY: There is no abnormality in the CBF less than 30% or  the time to maximal enhancement greater than 6 second maps. However,  there are some areas of diminished perfusion within the left occipital  lobe on the time to maximum enhancement greater than 4 second maps and  measures up to 13 cc.     CTA NECK: There is a classic configuration of the aortic arch. There is  no significant great vessel origin stenosis. A mild degree of  stenosis  is appreciated at the origin of the dominant right vertebral artery.  Atherosclerotic changes are identified within the carotid bifurcations  and proximal internal carotids. However, strictly speaking, there is no  significant NASCET stenosis. Again, no significant interval change is  seen in the CT angiogram of the neck when compared to the prior study  dated 08/16/2023.     CTA HEAD: There is mild to moderate atherosclerotic irregularity and  narrowing noted within the V4 segment of the right vertebral artery as  well as the basilar artery. The basilar artery is unremarkable in  appearance. The posterior cerebral arteries are unremarkable.  Atherosclerotic calcifications are appreciated within the carotid  siphons resulting in only mild degrees of luminal compromise. The middle  and anterior cerebral arteries are unremarkable in appearance. When  compared to the prior CT angiogram of the head, there is no significant  interval change     DELAYED POSTCONTRAST HEAD CT: No abnormal foci of contrast enhancement  are identified within the brain parenchyma.       Impression:      There is no abnormality on the CBF less than 30% or the time  to maximum enhancement greater than 6 seconds perfusion maps. However,  there are some areas of diminished perfusion within the left occipital  lobe on the time to maximum enhancement greater than 4 second maps  measuring up to 13 cc that are within the left PCA distribution. The  findings are compatible with an area of low level diminished perfusion  within the left occipital lobe. No prior perfusion study was performed  and a new small acute infarct within the left occipital lobe could not  be excluded. Further evaluation could be performed with an MRI of the  brain for much more sensitive and specific area of acute infarction.     There is no significant interval change in the CT angiogram of the head  and neck when compared to the prior study dated 08/16/2023. There  is  mild to moderate atherosclerotic irregularity and narrowing of the V4  segment of the right vertebral artery as well as the basilar artery.  There is a moderate degree of stenosis within the origin of the right  vertebral artery. Atherosclerotic changes are identified within the  internal carotid arteries. However, there is no significant NASCET  stenosis.     These findings were contemporaneously discussed with Dr. Kraus.      Radiation dose reduction techniques were utilized, including automated  exposure control and exposure modulation based on body size.        This report was finalized on 8/19/2023 6:31 PM by Dr. Aniceto Deleon M.D.       CT CEREBRAL PERFUSION WITH & WITHOUT CONTRAST [[id number]] Collected: 08/19/23 1829     Updated: 08/19/23 1834    Narrative:      CT ANGIOGRAM OF THE HEAD AND NECK AND CT PERFUSION STUDY     CLINICAL HISTORY: Difficulty standing up. Recent cerebellar infarct.     TECHNIQUE: A noncontrast head CT was performed with 3 mm axial images.  Thereafter, a CT perfusion study was performed after the dynamic bolus  of IV contrast. Standard perfusion maps were constructed with RAPID  software. A CT angiogram of the head and neck was then performed with 1  mm axial images. Sagittal, coronal, and 3-dimensional reconstructed  images were obtained. Finally, a delayed postcontrast head CT was  performed with 3 mm axial images. AI analysis of LVO was utilized.     COMPARISON: CT angiogram of the head and neck dated 08/16/2023 and CT  head dated 08/18/2023.     FINDINGS:     NONCONTRAST HEAD CT: On the previous MRI of the brain dated 08/16/2023,  there were 2 small foci of acute infarction noted within the inferior  aspect of the left cerebellar hemisphere within the left PICA  distribution. These are not well delineated on this CT examination.  There is no convincing new area of acute infarction on the head CT.     Incidental note is made of partial opacification of the left mastoid  air  cells which was also seen on the prior head CT dated 08/18/2023.     CT PERFUSION STUDY: There is no abnormality in the CBF less than 30% or  the time to maximal enhancement greater than 6 second maps. However,  there are some areas of diminished perfusion within the left occipital  lobe on the time to maximum enhancement greater than 4 second maps and  measures up to 13 cc.     CTA NECK: There is a classic configuration of the aortic arch. There is  no significant great vessel origin stenosis. A mild degree of stenosis  is appreciated at the origin of the dominant right vertebral artery.  Atherosclerotic changes are identified within the carotid bifurcations  and proximal internal carotids. However, strictly speaking, there is no  significant NASCET stenosis. Again, no significant interval change is  seen in the CT angiogram of the neck when compared to the prior study  dated 08/16/2023.     CTA HEAD: There is mild to moderate atherosclerotic irregularity and  narrowing noted within the V4 segment of the right vertebral artery as  well as the basilar artery. The basilar artery is unremarkable in  appearance. The posterior cerebral arteries are unremarkable.  Atherosclerotic calcifications are appreciated within the carotid  siphons resulting in only mild degrees of luminal compromise. The middle  and anterior cerebral arteries are unremarkable in appearance. When  compared to the prior CT angiogram of the head, there is no significant  interval change     DELAYED POSTCONTRAST HEAD CT: No abnormal foci of contrast enhancement  are identified within the brain parenchyma.       Impression:      There is no abnormality on the CBF less than 30% or the time  to maximum enhancement greater than 6 seconds perfusion maps. However,  there are some areas of diminished perfusion within the left occipital  lobe on the time to maximum enhancement greater than 4 second maps  measuring up to 13 cc that are within the left PCA  distribution. The  findings are compatible with an area of low level diminished perfusion  within the left occipital lobe. No prior perfusion study was performed  and a new small acute infarct within the left occipital lobe could not  be excluded. Further evaluation could be performed with an MRI of the  brain for much more sensitive and specific area of acute infarction.     There is no significant interval change in the CT angiogram of the head  and neck when compared to the prior study dated 08/16/2023. There is  mild to moderate atherosclerotic irregularity and narrowing of the V4  segment of the right vertebral artery as well as the basilar artery.  There is a moderate degree of stenosis within the origin of the right  vertebral artery. Atherosclerotic changes are identified within the  internal carotid arteries. However, there is no significant NASCET  stenosis.     These findings were contemporaneously discussed with Dr. Kraus.      Radiation dose reduction techniques were utilized, including automated  exposure control and exposure modulation based on body size.        This report was finalized on 8/19/2023 6:31 PM by Dr. Aniceto Deleon M.D.                 Assessment:  Active Hospital Problems    Diagnosis  POA    **Ataxia [R27.0]  Yes      Resolved Hospital Problems   No resolved problems to display.   Fall  Dizziness  Hypertension  hyperglycemia    Plan:  Stroke workup  Neurology consult  Therapies evaluation  Trend labs  Dw patient and ed provider    Xena Cooper MD  8/19/2023  22:39 EDT

## 2023-08-20 NOTE — PLAN OF CARE
Goal Outcome Evaluation:  Plan of Care Reviewed With: patient      Pt is 59 y/o M admitted to [facility] on 23 (only a few hours before being d/c from [facility]) with worsening sxs from recent cerebellar CGA. At baseline pt is indep, from home alone, a flight of stairs to enter. Pt reports balance worsened at home and had to lay down d/t dizziness/blurry vision. Pt currently demos CGA for all tasks with A of 2 for safety as he is significantly impulsive at this time. Reports laying supine relieves sxs but notes significant dizziness and blurry vision with any upright sitting, standing, walking. Pt noted to have increased BP with upright posture: sittin/98, after ambulating 3' to recliner: 152/104 and supine: 149/95. Encouraged pt to remain in bed and work on tolerating increased angle of HOB throughout the day to assimilate better to upright positioning. Pt demos mobility below baseline and therefore would benefit from acute skilled PT to address functional mobility deficits. Anticipate d/c home with assist vs acute rehab pending progress as pt currently not safe to return home.              Anticipated Discharge Disposition (PT): home with assist, inpatient rehabilitation facility (pending progres)

## 2023-08-20 NOTE — PLAN OF CARE
Problem: Adult Inpatient Plan of Care  Goal: Plan of Care Review  8/20/2023 1626 by Roberta Pierce RN  Outcome: Ongoing, Progressing  8/20/2023 1626 by Roberta Pierce RN  Outcome: Ongoing, Progressing     Problem: Adult Inpatient Plan of Care  Goal: Absence of Hospital-Acquired Illness or Injury  Intervention: Identify and Manage Fall Risk  Recent Flowsheet Documentation  Taken 8/20/2023 1226 by Roberta Pierce RN  Safety Promotion/Fall Prevention:   safety round/check completed   room organization consistent   clutter free environment maintained   assistive device/personal items within reach  Taken 8/20/2023 1000 by Roberta Pierce RN  Safety Promotion/Fall Prevention:   safety round/check completed   room organization consistent   clutter free environment maintained   assistive device/personal items within reach  Taken 8/20/2023 0845 by Roberta Pierce RN  Safety Promotion/Fall Prevention:   safety round/check completed   room organization consistent   nonskid shoes/slippers when out of bed   clutter free environment maintained   assistive device/personal items within reach   activity supervised   fall prevention program maintained     Goal Outcome Evaluation:  Patient not complaining of any pain on assessment. Assist x1 to BR but did c/o dizziness upon standing. Worked with PT today. Went for MRI today, results are pending. Resting in bed with bed alarm on. Call light within reach.

## 2023-08-20 NOTE — THERAPY EVALUATION
Patient Name: Flako Coreas  : 1965    MRN: [id number]                              Today's Date: 2023       Admit Date: 2023    Visit Dx:     ICD-10-CM ICD-9-CM   1. Ataxia  R27.0 781.3   2. History of cerebellar stroke  Z86.73 V12.54     Patient Active Problem List   Diagnosis    Mixed anxiety depressive disorder    Mixed hyperlipidemia    Essential hypertension    Diverticulosis    Nodule of spleen    Chronic bilateral lower abdominal pain    Lepe's esophagus without dysplasia    GERD (gastroesophageal reflux disease)    History of esophagitis    Dizziness    Acute CVA (cerebrovascular accident)    Hypertensive emergency    Ataxia     Past Medical History:   Diagnosis Date    ADHD (attention deficit hyperactivity disorder)     On going issue    Anxiety     Lepe's esophagus     Benign prostatic hyperplasia Surgery in 2021    Clotting disorder Intestinal    Depression     Diverticulitis     Diverticulosis     Duodenitis     Enteritis     Failure to thrive (child)     Family history of blood clots     GERD (gastroesophageal reflux disease)     Hyperlipidemia     Hypertension     Hypertensive emergency     Low testosterone     Microcytosis     Pancreatitis     Pneumonia     PONV (postoperative nausea and vomiting)     Seasonal affective disorder     Shoulder injury     Stroke     Unexplained weight loss      Past Surgical History:   Procedure Laterality Date    COLON SURGERY      COLONOSCOPY      COLONOSCOPY N/A 2022    Procedure: COLONOSCOPY INTO CECUM WITH HOT SNARE POLYPECTOMY;  Surgeon: Albert Gallo MD;  Location: [facility] ENDOSCOPY;  Service: Gastroenterology;  Laterality: N/A;  PRE- GI BLEED  POST- DIVERTICULOSIS, POLYP    ENDOSCOPY N/A 12/10/2022    Procedure: ESOPHAGOGASTRODUODENOSCOPY;  Surgeon: Albert Gallo MD;  Location: [facility] ENDOSCOPY;  Service: Gastroenterology;  Laterality: N/A;  PRE- DARK STOOLS  POST- BARRETTS ESOPHAGUS    FRACTURE SURGERY      for broken  arm    FRACTURE SURGERY      for broken hand    INCISION AND DRAINAGE ABSCESS  2015    anal    PROSTATE SURGERY      SMALL INTESTINE SURGERY      TONSILLECTOMY        General Information       Row Name 08/20/23 1221          Physical Therapy Time and Intention    Document Type evaluation  -     Mode of Treatment physical therapy  -       Row Name 08/20/23 1221          General Information    Patient Profile Reviewed yes  -ST     Prior Level of Function independent:  -ST     Existing Precautions/Restrictions fall  -       Row Name 08/20/23 1221          Living Environment    People in Home alone  -[facility] Name 08/20/23 1221          Home Main Entrance    Number of Stairs, Main Entrance eight  -ST     Stair Railings, Main Entrance railings safe and in good condition  -       Row Name 08/20/23 1221          Stairs Within Home, Primary    Number of Stairs, Within Home, Primary none  -[facility] Name 08/20/23 1221          Cognition    Orientation Status (Cognition) oriented x 4  -[facility] Name 08/20/23 1221          Safety Issues, Functional Mobility    Safety Issues Affecting Function (Mobility) impulsivity  -ST     Impairments Affecting Function (Mobility) balance  -     Comment, Safety Issues/Impairments (Mobility) gait belt, nonskid socks donned  -ST               User Key  (r) = Recorded By, (t) = Taken By, (c) = Cosigned By      Initials Name Provider Type    ST Brandi Jiang PT Physical Therapist                   Mobility       Row Name 08/20/23 1222          Bed Mobility    Bed Mobility supine-sit;sit-supine  -ST     Supine-Sit [place] (Bed Mobility) standby assist  -ST     Sit-Supine [place] (Bed Mobility) standby assist  -ST     Assistive Device (Bed Mobility) bed rails;head of bed elevated  -ST     Comment, (Bed Mobility) VC for safety awareness  -       Row Name 08/20/23 1222          Sit-Stand Transfer    Sit-Stand [place] (Transfers) contact guard;1 person to  manage equipment;1 person assist  -ST     Comment, (Sit-Stand Transfer) VC for safety awareness and decreased speed  -ST       Row Name 08/20/23 1222          Gait/Stairs (Locomotion)    [place] Level (Gait) contact guard;1 person assist;1 person to manage equipment  -ST     Distance in Feet (Gait) 3' to and from recliner  -ST     Deviations/Abnormal Patterns (Gait) ataxic;base of support, narrow;julia decreased;stride length decreased  -ST     Bilateral Gait Deviations forward flexed posture  -ST     Comment, (Gait/Stairs) very impulsive d/t sxs with upright sitting and standing. constant VC for safety needed  -ST               User Key  (r) = Recorded By, (t) = Taken By, (c) = Cosigned By      Initials Name Provider Type    Brandi Roy, CARIN Physical Therapist                   Obj/Interventions       Row Name 08/20/23 1224          Range of Motion Comprehensive    General Range of Motion no range of motion deficits identified  -ST       Row Name 08/20/23 1224          Strength Comprehensive (MMT)    General Manual Muscle Testing (MMT) Assessment no strength deficits identified  -ST     Comment, General Manual Muscle Testing (MMT) Assessment bilat LE grossly 5/5  -ST       Row Name 08/20/23 1224          Balance    Comment, Balance CGA for dynamic balance d/t significant impulsiveness and unsteadiness. no LOB but forward lean noted with lateral sway just ambulating 3' to chair and back  -ST               User Key  (r) = Recorded By, (t) = Taken By, (c) = Cosigned By      Initials Name Provider Type    ST Brandi Jiang, PT Physical Therapist                   Goals/Plan       Row Name 08/20/23 1225          Bed Mobility Goal 1 (PT)    Activity/Assistive Device (Bed Mobility Goal 1, PT) bed mobility activities, all  -ST     [place] Level/Cues Needed (Bed Mobility Goal 1, PT) independent  -ST     Time Frame (Bed Mobility Goal 1, PT) 1 week  -ST     Progress/Outcomes (Bed Mobility Goal 1,  PT) new goal  -ST       Row Name 08/20/23 1225          Transfer Goal 1 (PT)    Activity/Assistive Device (Transfer Goal 1, PT) sit-to-stand/stand-to-sit;bed-to-chair/chair-to-bed  -ST     [place] Level/Cues Needed (Transfer Goal 1, PT) independent  -ST     Time Frame (Transfer Goal 1, PT) 1 week  -ST     Progress/Outcome (Transfer Goal 1, PT) new goal  -ST       Row Name 08/20/23 1225          Gait Training Goal 1 (PT)    Activity/Assistive Device (Gait Training Goal 1, PT) gait (walking locomotion)  -ST     [place] Level (Gait Training Goal 1, PT) supervision required  -ST     Distance (Gait Training Goal 1, PT) 150'  -ST     Time Frame (Gait Training Goal 1, PT) 1 week  -ST     Progress/Outcome (Gait Training Goal 1, PT) new goal  -ST       Row Name 08/20/23 1225          Stairs Goal 1 (PT)    Activity/Assistive Device (Stairs Goal 1, PT) ascending stairs;descending stairs  -ST     [place] Level/Cues Needed (Stairs Goal 1, PT) standby assist  -ST     Number of Stairs (Stairs Goal 1, PT) 8  -ST     Time Frame (Stairs Goal 1, PT) 1 week  -ST     Progress/Outcome (Stairs Goal 1, PT) new goal  -ST               User Key  (r) = Recorded By, (t) = Taken By, (c) = Cosigned By      Initials Name Provider Type    ST Brandi Jiang, PT Physical Therapist                   Clinical Impression       Row Name 08/20/23 1225          Pain    Pretreatment Pain Rating 0/10 - no pain  -ST     Posttreatment Pain Rating 0/10 - no pain  -ST       Row Name 08/20/23 1225          Plan of Care Review    Plan of Care Reviewed With patient  -ST     Outcome Evaluation Pt is 59 y/o M admitted to [facility] on 8/19/23 (only a few hours before being d/c from [facility]) with worsening sxs from recent cerebellar CGA. At baseline pt is indep, from home alone, a flight of stairs to enter. Pt reports balance worsened at home and had to lay down d/t dizziness/blurry vision. Pt currently demos CGA for all tasks with A of 2 for safety as he is  significantly impulsive at this time. Reports laying supine relieves sxs but notes significant dizziness and blurry vision with any upright sitting, standing, walking. Pt noted to have increased BP with upright posture: sittin/98, after ambulating 3' to recliner: 152/104 and supine: 149/95. Encouraged pt to remain in bed and work on tolerating increased angle of HOB throughout the day to assimilate better to upright positioning. Pt demos mobility below baseline and therefore would benefit from acute skilled PT to address functional mobility deficits. Anticipate d/c home with assist vs acute rehab pending progress as pt currently not safe to return home.  -ST       Row Name 23 1225          Therapy Assessment/Plan (PT)    Rehab Potential (PT) good, to achieve stated therapy goals  -ST     Criteria for Skilled Interventions Met (PT) yes  -ST     Therapy Frequency (PT) 6 times/wk  -ST     Predicted Duration of Therapy Intervention (PT) 1 week  -ST       Row Name 23 1225          Positioning and Restraints    Pre-Treatment Position in bed  -ST     Post Treatment Position bed  -ST     In Bed notified nsg;supine;call light within reach;encouraged to call for assist;exit alarm on  -ST               User Key  (r) = Recorded By, (t) = Taken By, (c) = Cosigned By      Initials Name Provider Type    Brandi Roy, PT Physical Therapist                   Outcome Measures       Row Name 23 1226 23 0845       How much help from another person do you currently need...    Turning from your back to your side while in flat bed without using bedrails? 4  -ST 4  -MB    Moving from lying on back to sitting on the side of a flat bed without bedrails? 4  -ST 4  -MB    Moving to and from a bed to a chair (including a wheelchair)? 3  -ST 4  -MB    Standing up from a chair using your arms (e.g., wheelchair, bedside chair)? 4  -ST 3  -MB    Climbing 3-5 steps with a railing? 3  -ST 3  -MB    To walk in  hospital room? 3  -ST 3  -MB    AM-PAC 6 Clicks Score (PT) 21  -ST 21  -MB    Highest level of mobility 6 --> Walked 10 steps or more  -ST 6 --> Walked 10 steps or more  -MB      Row Name 08/20/23 1226          Functional Assessment    Outcome Measure Options AM-PAC 6 Clicks Basic Mobility (PT)  -ST               User Key  (r) = Recorded By, (t) = Taken By, (c) = Cosigned By      Initials Name Provider Type    ST Brandi Jiang PT Physical Therapist    Roberta Noble, RN Registered Nurse                                 Physical Therapy Education       Title: PT OT SLP Therapies (In Progress)       Topic: Physical Therapy (In Progress)       Point: Mobility training (Done)       Learning Progress Summary             Patient Acceptance, E,TB, VU,NR by  at 8/20/2023 1226                         Point: Home exercise program (Not Started)       Learner Progress:  Not documented in this visit.              Point: Body mechanics (Done)       Learning Progress Summary             Patient Acceptance, E,TB, VU,NR by  at 8/20/2023 1226                         Point: Precautions (Done)       Learning Progress Summary             Patient Acceptance, E,TB, VU,NR by  at 8/20/2023 1226                                         User Key       Initials Effective Dates Name Provider Type Discipline     09/22/22 -  Brandi Jiang PT Physical Therapist PT                  PT Recommendation and Plan     Plan of Care Reviewed With: patient  Outcome Evaluation: Pt is 57 y/o M admitted to [facility] on 8/19/23 (only a few hours before being d/c from [facility]) with worsening sxs from recent cerebellar CGA. At baseline pt is indep, from home alone, a flight of stairs to enter. Pt reports balance worsened at home and had to lay down d/t dizziness/blurry vision. Pt currently demos CGA for all tasks with A of 2 for safety as he is significantly impulsive at this time. Reports laying supine relieves sxs but notes significant dizziness  and blurry vision with any upright sitting, standing, walking. Pt noted to have increased BP with upright posture: sittin/98, after ambulating 3' to recliner: 152/104 and supine: 149/95. Encouraged pt to remain in bed and work on tolerating increased angle of HOB throughout the day to assimilate better to upright positioning. Pt demos mobility below baseline and therefore would benefit from acute skilled PT to address functional mobility deficits. Anticipate d/c home with assist vs acute rehab pending progress as pt currently not safe to return home.     Time Calculation:         PT Charges       Row Name 23 1230             Time Calculation    Start Time 1050  -ST      Stop Time 1113  -ST      Time Calculation (min) 23 min  -ST      PT Received On 23  -ST      PT - Next Appointment 23  -ST      PT Goal Re-Cert Due Date 23  -ST         Time Calculation- PT    Total Timed Code Minutes- PT 13 minute(s)  -ST         Timed Charges    83446 - PT Therapeutic Activity Minutes 13  -ST         Total Minutes    Timed Charges Total Minutes 13  -ST       Total Minutes 13  -ST                User Key  (r) = Recorded By, (t) = Taken By, (c) = Cosigned By      Initials Name Provider Type    ST Brandi Jiang, PT Physical Therapist                  Therapy Charges for Today       Code Description Service Date Service Provider Modifiers Qty    98106608708 HC PT THERAPEUTIC ACT EA 15 MIN 2023 Brandi Jiang, PT GP 1    87101765600 HC PT EVAL MOD COMPLEXITY 2 2023 Brandi Jiang, PT GP 1            PT G-Codes  Outcome Measure Options: AM-PAC 6 Clicks Basic Mobility (PT)  AM-PAC 6 Clicks Score (PT): 21  PT Discharge Summary  Anticipated Discharge Disposition (PT): home with assist, inpatient rehabilitation facility (pending progres)    Brandi Jiang PT  2023

## 2023-08-20 NOTE — PLAN OF CARE
Goal Outcome Evaluation:  Plan of Care Reviewed With: patient           Outcome Evaluation: Pt is a 58 y.o male admitted to [facility] with dizziness and unsteady gait. He had only been home from the hospital a few hours after having a recent cerebellar stroke on 8/16. When pt left the hospital he reports he was getting up without difficulty but when he got home his symptoms had increased. He reports he had to sit down on the floor and was unable to walk up his flight of steps. He lives alone. Today pt with dizziness, decreased balance, and some safety awareness deficits largely limiting. His UEs are coordinated and has good strength. Pt is only able to take a few steps to the chair today CGA but then reports increased HA and dizziness and returned back to bed. Recommend continued OT to address deficits and progress independence with ADLs. He is not safe at this time to return back home alone.      Anticipated Discharge Disposition (OT): inpatient rehabilitation facility

## 2023-08-20 NOTE — CONSULTS
Neurology Consult Note    Consult Date: 8/20/2023    Referring MD: Xena Cooper, *    Reason for Consult I have been asked to see the patient in neurological consultation to render advice and opinion regarding recent left cerebellar stroke, unsteady gait.    Flako Coreas is a 58 y.o. white male with known diagnosis of uncontrolled hypertension, mixed hyperlipidemia, alcohol use, recent left cerebellar stroke with residual gait imbalance who got discharged home yesterday then few hours later his symptoms/wobbly gait got worse and he presented again to the hospital, he stated that he was veering to the left side during walking and that felt worse than his gait prior to the discharge and he decided to come back to the hospital.  On presentation he was found to have a blood pressure that running high 140s-170s/90s to 130s diastolic, he denied other focal neurological symptoms.  He was discharged on dual antiplatelet therapy with aspirin 81 mg, Plavix 75 mg daily for 21 days, Lipitor 80 mg daily.  His blood pressure he was discharged on hydralazine 1000 mg 3 times daily, amlodipine 5 mg daily, lisinopril 40 mg daily, spironolactone 25 mg daily.  On neurological examination right now he is at baseline with NIH score of 0, he had an abnormal gait with veering to the left side.  He states that he drinks 3-4 beers a day, denied smoking or drug illicit.  Past Medical History:   Diagnosis Date    ADHD (attention deficit hyperactivity disorder)     On going issue    Anxiety     Lepe's esophagus     Benign prostatic hyperplasia Surgery in 12/2021    Clotting disorder Intestinal    Depression     Diverticulitis     Diverticulosis     Duodenitis     Enteritis     Failure to thrive (child)     Family history of blood clots     GERD (gastroesophageal reflux disease)     Hyperlipidemia     Hypertension     Hypertensive emergency     Low testosterone     Microcytosis     Pancreatitis     Pneumonia     PONV  "(postoperative nausea and vomiting)     Seasonal affective disorder     Shoulder injury     Stroke     Unexplained weight loss        Exam  /81 (BP Location: Right arm, Patient Position: Lying)   Pulse 80   Temp 97.8 øF (36.6 øC) (Oral)   Resp 18   Ht 187.9 cm (73.98\")   Wt 102 kg (225 lb)   SpO2 96%   BMI 28.91 kg/mý   Gen: NAD, vitals reviewed  MS: oriented x3, recent/remote memory intact, normal attention/concentration, language intact, no neglect.  CN: visual acuity grossly normal, PERRL, EOMI, no facial droop, no dysarthria  Motor: 5/5 throughout upper and lower extremities, normal tone  Sensory exam: Normal to light touch throughout  Coordination: Normal finger-nose-finger and heel-to-shin bilaterally  Reflexes: 2+ throughout with downgoing plantars  Gait: Abnormal with veering to the left side    DATA:    Lab Results   Component Value Date    GLUCOSE 94 08/20/2023    CALCIUM 8.8 08/20/2023     08/20/2023    K 4.0 08/20/2023    CO2 22.1 08/20/2023     (H) 08/20/2023    BUN 20 08/20/2023    CREATININE 1.08 08/20/2023    EGFRIFAFRI >60 09/28/2018    EGFRIFNONA 98 11/05/2018    BCR 18.5 08/20/2023    ANIONGAP 9.9 08/20/2023     Lab Results   Component Value Date    WBC 7.35 08/20/2023    HGB 14.4 08/20/2023    HCT 43.8 08/20/2023    MCV 80.8 08/20/2023     08/20/2023       Lab review: Stroke labs showed normal TSH, LDL 62 was 119 4 days ago, B12 A1c 5.2.  500,    Imaging review: I personally reviewed the CT angio head and neck that was recently  that showed no significant stenosis/occlusion, he had a dominant right vertebral artery and the left vert gets very narrowed after giving the PICA branch.  He had a tortuous blood vessel indicating uncontrolled hypertension.    CT perfusion reviewed and agree with radiology.  There is no abnormality in the CBF less than 30% or  the time to maximal enhancement greater than 6 second maps. However,  there are some areas of diminished perfusion " within the left occipital  lobe on the time to maximum enhancement greater than 4 second maps and  measures up to 13 cc.    MRI brain from his last admission personally reviewed and showed small tiny strokes on the left cerebellum.    2D echo from last admission EF 66-70, no LVT, left atrium mildly dilated with negative saline test.    Diagnoses:  -Worsening gait likely related to his recent cerebellar stroke with possible exacerbation due to uncontrolled hypertension, rule out any new cerebellar strokes.  -Uncontrolled hypertension  -Mixed hyperlipidemia  -Alcohol use    Pre-stroke MRS: 2  NIHSS: 0      PLAN:   1.  Continue dual antiplatelet therapy  2.  Decrease Lipitor to 40 mg daily his LDL 62 with goal less than 70.  3.  Repeat brain MRI to look for new strokes  4.  Recommend better blood pressure control with goal less than 140/90 for the long-term.  5.  PT/OT evaluation   6.  No need to repeat 2D echo  7.  Counseled the patient regarding alcohol use, advised no more than 2 beers a day, prefer to quit drinking.    We will follow-up on the MRI brain for further recommendations, thank you for the consult.    Medical Decision Making for this neurology consultation consists of the following:  Review of previous chart, including H/P, provider and nursing notes as applicable.  Review of medications and vital signs.  Review of previous labs, neuroimaging, and additional relevant diagnostics.   Interpretation of laboratory, imaging, and other diagnostic results  Discussion with other providers and family   Total face-to-face/floor time: 60 minutes.

## 2023-08-20 NOTE — PLAN OF CARE
Goal Outcome Evaluation:         Pt was feeling anxious and agitated, CIWA was added to his care plan. He received 2mg of Ativan last night for a score of 16. Pt has slept through the night, vital signs are stable and will continue to monitor.      Problem: Adult Inpatient Plan of Care  Goal: Plan of Care Review  Outcome: Ongoing, Progressing  Goal: Patient-Specific Goal (Individualized)  Outcome: Ongoing, Progressing  Goal: Absence of Hospital-Acquired Illness or Injury  Outcome: Ongoing, Progressing  Intervention: Identify and Manage Fall Risk  Recent Flowsheet Documentation  Taken 8/20/2023 0400 by Tarik Mccloud, RN  Safety Promotion/Fall Prevention:   safety round/check completed   activity supervised   assistive device/personal items within reach   clutter free environment maintained   fall prevention program maintained  Taken 8/20/2023 0200 by Tarik Mccloud, RN  Safety Promotion/Fall Prevention:   safety round/check completed   activity supervised   assistive device/personal items within reach   clutter free environment maintained   fall prevention program maintained  Taken 8/20/2023 0000 by Tarik Mccloud, RN  Safety Promotion/Fall Prevention:   safety round/check completed   activity supervised   assistive device/personal items within reach   clutter free environment maintained   fall prevention program maintained  Taken 8/19/2023 2200 by Tarik Mccloud, RN  Safety Promotion/Fall Prevention:   safety round/check completed   activity supervised   assistive device/personal items within reach   clutter free environment maintained   fall prevention program maintained  Taken 8/19/2023 2030 by Tarik Mccloud, RN  Safety Promotion/Fall Prevention:   safety round/check completed   activity supervised   assistive device/personal items within reach   clutter free environment maintained   fall prevention program maintained  Intervention: Prevent Skin Injury  Recent Flowsheet Documentation  Taken  8/20/2023 0400 by Tarik Mccloud RN  Body Position: position changed independently  Taken 8/20/2023 0200 by Tarik Mccloud RN  Body Position: position changed independently  Taken 8/20/2023 0000 by Tarik Mccloud RN  Body Position: position changed independently  Taken 8/19/2023 2200 by Tarik Mccloud RN  Body Position: position changed independently  Taken 8/19/2023 2030 by Tarik Mccloud RN  Body Position: position changed independently  Intervention: Prevent and Manage VTE (Venous Thromboembolism) Risk  Recent Flowsheet Documentation  Taken 8/19/2023 2030 by Tarik Mccloud RN  VTE Prevention/Management:   bilateral   sequential compression devices off   patient refused intervention  Range of Motion: active ROM (range of motion) encouraged  Intervention: Prevent Infection  Recent Flowsheet Documentation  Taken 8/20/2023 0400 by Tarik Mccloud RN  Infection Prevention: rest/sleep promoted  Taken 8/20/2023 0200 by Tarik Mccloud RN  Infection Prevention: rest/sleep promoted  Taken 8/20/2023 0000 by Tarik Mccloud RN  Infection Prevention:   rest/sleep promoted   hand hygiene promoted  Taken 8/19/2023 2200 by Tarik Mccloud RN  Infection Prevention: environmental surveillance performed  Taken 8/19/2023 2030 by Tarik Mccloud RN  Infection Prevention:   environmental surveillance performed   rest/sleep promoted  Goal: Optimal Comfort and Wellbeing  Outcome: Ongoing, Progressing  Intervention: Provide Person-Centered Care  Recent Flowsheet Documentation  Taken 8/19/2023 2030 by Tarik Mccloud RN  Trust Relationship/Rapport:   care explained   emotional support provided   questions answered   questions encouraged   thoughts/feelings acknowledged   reassurance provided  Goal: Readiness for Transition of Care  Outcome: Ongoing, Progressing  Intervention: Mutually Develop Transition Plan  Recent Flowsheet Documentation  Taken 8/19/2023 2159 by Tarik Mccloud  RN  Equipment Currently Used at Home: none  Taken 8/19/2023 2157 by Tarik Mccloud RN  Transportation Anticipated: family or friend will provide  Transportation Concerns: no car  Patient/Family Anticipated Services at Transition:   Patient/Family Anticipates Transition to: home     Problem: Fall Injury Risk  Goal: Absence of Fall and Fall-Related Injury  Outcome: Ongoing, Progressing  Intervention: Identify and Manage Contributors  Recent Flowsheet Documentation  Taken 8/19/2023 2030 by Tarik Mccloud RN  Medication Review/Management: medications reviewed  Intervention: Promote Injury-Free Environment  Recent Flowsheet Documentation  Taken 8/20/2023 0400 by Tarik Mccloud RN  Safety Promotion/Fall Prevention:   safety round/check completed   activity supervised   assistive device/personal items within reach   clutter free environment maintained   fall prevention program maintained  Taken 8/20/2023 0200 by Tarik Mccloud RN  Safety Promotion/Fall Prevention:   safety round/check completed   activity supervised   assistive device/personal items within reach   clutter free environment maintained   fall prevention program maintained  Taken 8/20/2023 0000 by Tarik Mccloud RN  Safety Promotion/Fall Prevention:   safety round/check completed   activity supervised   assistive device/personal items within reach   clutter free environment maintained   fall prevention program maintained  Taken 8/19/2023 2200 by Tarik Mccloud RN  Safety Promotion/Fall Prevention:   safety round/check completed   activity supervised   assistive device/personal items within reach   clutter free environment maintained   fall prevention program maintained  Taken 8/19/2023 2030 by Tarik Mccloud RN  Safety Promotion/Fall Prevention:   safety round/check completed   activity supervised   assistive device/personal items within reach   clutter free environment maintained   fall prevention program  maintained     Problem: Hypertension Comorbidity  Goal: Blood Pressure in Desired Range  Outcome: Ongoing, Progressing  Intervention: Maintain Blood Pressure Management  Recent Flowsheet Documentation  Taken 8/19/2023 2030 by Tarik Mccloud RN  Medication Review/Management: medications reviewed     Problem: Obstructive Sleep Apnea Risk or Actual Comorbidity Management  Goal: Unobstructed Breathing During Sleep  Outcome: Ongoing, Progressing     Problem: Pain Chronic (Persistent) (Comorbidity Management)  Goal: Acceptable Pain Control and Functional Ability  Outcome: Ongoing, Progressing  Intervention: Manage Persistent Pain  Recent Flowsheet Documentation  Taken 8/19/2023 2030 by Tarik Mccloud RN  Medication Review/Management: medications reviewed  Intervention: Optimize Psychosocial Wellbeing  Recent Flowsheet Documentation  Taken 8/19/2023 2030 by Tarik Mccloud RN  Diversional Activities: television  Family/Support System Care: support provided     Problem: Adjustment to Illness (Stroke, Ischemic/Transient Ischemic Attack)  Goal: Optimal Coping  Outcome: Ongoing, Progressing  Intervention: Support Psychosocial Response to Stroke  Recent Flowsheet Documentation  Taken 8/19/2023 2030 by Tarik Mccloud RN  Family/Support System Care: support provided     Problem: Bowel Elimination Impaired (Stroke, Ischemic/Transient Ischemic Attack)  Goal: Effective Bowel Elimination  Outcome: Ongoing, Progressing     Problem: Cerebral Tissue Perfusion (Stroke, Ischemic/Transient Ischemic Attack)  Goal: Optimal Cerebral Tissue Perfusion  Outcome: Ongoing, Progressing     Problem: Pain Acute  Goal: Acceptable Pain Control and Functional Ability  Outcome: Ongoing, Progressing  Intervention: Prevent or Manage Pain  Recent Flowsheet Documentation  Taken 8/19/2023 2030 by Tarik Mccloud RN  Medication Review/Management: medications reviewed  Intervention: Optimize Psychosocial Wellbeing  Recent Flowsheet  Documentation  Taken 8/19/2023 2030 by Tarik Mccloud, RN  Diversional Activities: television

## 2023-08-20 NOTE — OUTREACH NOTE
Prep Survey      Flowsheet Row Responses   [facility] patient discharged from? [place]   Is LACE score < 7 ? Yes   Eligibility Readm Mgmt   Discharge diagnosis acute CVA, Hypertensive emergency   Does the patient have one of the following disease processes/diagnoses(primary or secondary)? Stroke   Does the patient have Home health ordered? No   Is there a DME ordered? No   Prep survey completed? Yes            Itzel SOUZA - Registered Nurse

## 2023-08-20 NOTE — PROGRESS NOTES
[facility] Acute Inpt Rehab Note     Referral received via stroke order set.  Please note this is a screening only, rehab admissions will not actively be evaluating this patient.  If felt patient is appropriate for our services once therapies begin, please call our office at [phone], to initiate a full referral.    Thank you,   Ijeoma Thakkar RN   Rehab Admission Nurse

## 2023-08-20 NOTE — PROGRESS NOTES
"DAILY PROGRESS NOTE  [facility]    Patient Identification:  Name: Flako Coreas  Age: 58 y.o.  Sex: male  :  1965  MRN: [id number]         Primary Care Physician: Akash Rodriguez Jr., DO      Subjective  Patient presently with no new complaints.  States he feels about the same.  Still unsteady.  States he has not had alcohol since prior to his previous admission.    Objective:  General Appearance:  Comfortable, well-appearing, in no acute distress and not in pain.    Vital signs: (most recent): Blood pressure 157/99, pulse 64, temperature 98.2 øF (36.8 øC), temperature source Oral, resp. rate 18, height 187.9 cm (73.98\"), weight 102 kg (225 lb), SpO2 97 %.    Lungs:  Normal effort and normal respiratory rate.  Breath sounds clear to auscultation.    Heart: Normal rate.  Regular rhythm.    Extremities: There is no dependent edema.    Neurological: Patient is alert and oriented to person, place and time.  (Hand  normal.  EOMI.).    Skin:  Warm and dry.                Vital signs in last 24 hours:  Temp:  [97.7 øF (36.5 øC)-98.6 øF (37 øC)] 98.2 øF (36.8 øC)  Heart Rate:  [55-80] 64  Resp:  [18] 18  BP: (139-176)/() 157/99    Intake/Output:    Intake/Output Summary (Last 24 hours) at 2023 1637  Last data filed at 2023 0941  Gross per 24 hour   Intake --   Output 1100 ml   Net -1100 ml         Results from last 7 days   Lab Units 23  0612 23  1628 23  0531 23  0434 23  1458   WBC 10*3/mm3 7.35 10.04 6.95 8.26 9.02   HEMOGLOBIN g/dL 14.4 16.4 14.3 14.5 15.2   PLATELETS 10*3/mm3 221 300 221 233 275     Results from last 7 days   Lab Units 23  0612 23  1654 23  1628 23  0531 23  0434 23  1458   SODIUM mmol/L 141  --  144 142 141 139   POTASSIUM mmol/L 4.0  --  4.1 3.8 3.9 3.8   CHLORIDE mmol/L 109*  --  105 106 107 104   CO2 mmol/L 22.1  --  25.0 22.5 21.7* 19.9*   BUN mg/dL 20  --  22* 16 13 15 "   CREATININE mg/dL 1.08 1.20 1.22 1.00 0.88 1.09   GLUCOSE mg/dL 94  --  142* 110* 96 124*   Estimated Creatinine Clearance: 97.3 mL/min (by C-G formula based on SCr of 1.08 mg/dL).  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  Repeat MRI noted.  CVA: See above.  Continue dual antiplatelets.  Neurology input appreciated.    Hypertension: Uncontrolled.  Presently improved.  Combination of small multiple CVAs, uncontrolled hypertension-we will assess for possibility of cocaine/amphetamines.  Alcohol use:    All problems new to this examiner.    Plan:  Please see above for    Christian Romero MD  8/20/2023  16:37 EDT

## 2023-08-20 NOTE — THERAPY EVALUATION
Patient Name: Flako Coreas  : 1965    MRN: [id number]                              Today's Date: 2023       Admit Date: 2023    Visit Dx:     ICD-10-CM ICD-9-CM   1. Ataxia  R27.0 781.3   2. History of cerebellar stroke  Z86.73 V12.54     Patient Active Problem List   Diagnosis    Mixed anxiety depressive disorder    Mixed hyperlipidemia    Essential hypertension    Diverticulosis    Nodule of spleen    Chronic bilateral lower abdominal pain    Lepe's esophagus without dysplasia    GERD (gastroesophageal reflux disease)    History of esophagitis    Dizziness    Acute CVA (cerebrovascular accident)    Hypertensive emergency    Ataxia     Past Medical History:   Diagnosis Date    ADHD (attention deficit hyperactivity disorder)     On going issue    Anxiety     Lepe's esophagus     Benign prostatic hyperplasia Surgery in 2021    Clotting disorder Intestinal    Depression     Diverticulitis     Diverticulosis     Duodenitis     Enteritis     Failure to thrive (child)     Family history of blood clots     GERD (gastroesophageal reflux disease)     Hyperlipidemia     Hypertension     Hypertensive emergency     Low testosterone     Microcytosis     Pancreatitis     Pneumonia     PONV (postoperative nausea and vomiting)     Seasonal affective disorder     Shoulder injury     Stroke     Unexplained weight loss      Past Surgical History:   Procedure Laterality Date    COLON SURGERY      COLONOSCOPY      COLONOSCOPY N/A 2022    Procedure: COLONOSCOPY INTO CECUM WITH HOT SNARE POLYPECTOMY;  Surgeon: Albert Gallo MD;  Location: [facility] ENDOSCOPY;  Service: Gastroenterology;  Laterality: N/A;  PRE- GI BLEED  POST- DIVERTICULOSIS, POLYP    ENDOSCOPY N/A 12/10/2022    Procedure: ESOPHAGOGASTRODUODENOSCOPY;  Surgeon: Albert Gallo MD;  Location: [facility] ENDOSCOPY;  Service: Gastroenterology;  Laterality: N/A;  PRE- DARK STOOLS  POST- BARRETTS ESOPHAGUS    FRACTURE SURGERY      for broken  arm    FRACTURE SURGERY      for broken hand    INCISION AND DRAINAGE ABSCESS  2015    anal    PROSTATE SURGERY      SMALL INTESTINE SURGERY      TONSILLECTOMY        General Information       [facility] Name 08/20/23 1237          OT Time and Intention    Document Type evaluation  -     Mode of Treatment occupational therapy;co-treatment  -[facility] Name 08/20/23 1237          General Information    Patient Profile Reviewed yes  -SM     Prior Level of Function independent:;ADL's;community mobility  pt reports recently lost his job 3 weeks ago but typically active  -     Existing Precautions/Restrictions fall  -[facility] Name 08/20/23 1237          Living Environment    People in Home alone  -[facility] Name 08/20/23 1237          Home Main Entrance    Number of Stairs, Main Entrance eight  -[facility] Name 08/20/23 1237          Stairs Within Home, Primary    Number of Stairs, Within Home, Primary none  -SM       Row Name 08/20/23 1237          Cognition    Orientation Status (Cognition) oriented x 4  -SM       Row Name 08/20/23 1237          Safety Issues, Functional Mobility    Safety Issues Affecting Function (Mobility) impulsivity  -     Impairments Affecting Function (Mobility) balance;pain  dizziness  -               User Key  (r) = Recorded By, (t) = Taken By, (c) = Cosigned By      Initials Name Provider Type     Rafaela Bhatti OT Occupational Therapist                     Mobility/ADL's       [facility] Name 08/20/23 1238          Bed Mobility    Supine-Sit [place] (Bed Mobility) standby assist  -     Sit-Supine [place] (Bed Mobility) standby assist  -SM       Row Name 08/20/23 1238          Sit-Stand Transfer    Sit-Stand [place] (Transfers) contact guard  -SM       Row Name 08/20/23 1238          Functional Mobility    Functional Mobility- Ind. Level contact guard assist;2 person assist required  -     Functional Mobility-Distance (Feet) --  5  -     Functional Mobility-  "Comment 2 person assist for safety to manage pt and equipement as he is very dizzy and can be impulsive. This is his first time up since admission.  -[facility] Name 08/20/23 1238          Activities of Daily Living    BADL Assessment/Intervention feeding;grooming  -SM       Row Name 08/20/23 1238          Self-Feeding Assessment/Training    [place] Level (Feeding) independent  -SM       Row Name 08/20/23 1238          Grooming Assessment/Training    [place] Level (Grooming) set up  -     Position (Grooming) sitting up in bed  -SM       Row Name 08/20/23 1238          Lower Body Dressing Assessment/Training    [place] Level (Lower Body Dressing) don;socks;contact guard assist  -     Comment, (Lower Body Dressing) for balance, initally posterior LOB with attempt  -               User Key  (r) = Recorded By, (t) = Taken By, (c) = Cosigned By      Initials Name Provider Type    Rafaela Andino OT Occupational Therapist                   Obj/Interventions       Row Name 08/20/23 1239          Sensory Assessment (Somatosensory)    Sensory Assessment (Somatosensory) UE sensation intact  -SM       Row Name 08/20/23 1239          Vision Assessment/Intervention    Visual Impairment/Limitations blurry vision  -     Vision Assessment Comment pt reports vision still feels \"off\"  -[facility] Name 08/20/23 1239          Range of Motion Comprehensive    General Range of Motion no range of motion deficits identified  -SM       Row Name 08/20/23 1239          Strength Comprehensive (MMT)    Comment, General Manual Muscle Testing (MMT) Assessment BUE 5/5  -SM       Row Name 08/20/23 1239          Motor Skills    Coordination WFL;upper extremity;bilateral  -SM       Row Name 08/20/23 1239          Balance    Balance Assessment sitting dynamic balance  -     Static Sitting Balance standby assist  -     Dynamic Sitting Balance contact guard  -     Position, Sitting Balance sitting edge of bed  -  "    Static Standing Balance contact guard  -SM     Dynamic Standing Balance contact guard  -SM     Position/Device Used, Standing Balance supported;walker, rolling  -SM               User Key  (r) = Recorded By, (t) = Taken By, (c) = Cosigned By      Initials Name Provider Type    SM Rafaela Bhatti, OT Occupational Therapist                   Goals/Plan       Row Name 08/20/23 1251          Transfer Goal 1 (OT)    Activity/Assistive Device (Transfer Goal 1, OT) toilet  -SM     [place] Level/Cues Needed (Transfer Goal 1, OT) independent  -SM     Time Frame (Transfer Goal 1, OT) short term goal (STG);2 weeks  -SM     Progress/Outcome (Transfer Goal 1, OT) goal ongoing  -       Row Name 08/20/23 1251          Bathing Goal 1 (OT)    Activity/Device (Bathing Goal 1, OT) bathing skills, all  -SM     [place] Level/Cues Needed (Bathing Goal 1, OT) independent  -SM     Time Frame (Bathing Goal 1, OT) short term goal (STG);2 weeks  -SM     Progress/Outcomes (Bathing Goal 1, OT) goal ongoing  -       Row Name 08/20/23 1251          Dressing Goal 1 (OT)    Activity/Device (Dressing Goal 1, OT) dressing skills, all  -SM     [place]/Cues Needed (Dressing Goal 1, OT) independent  -SM     Time Frame (Dressing Goal 1, OT) short term goal (STG);2 weeks  -SM     Progress/Outcome (Dressing Goal 1, OT) goal ongoing  -       Row Name 08/20/23 1251          Toileting Goal 1 (OT)    Activity/Device (Toileting Goal 1, OT) toileting skills, all  -SM     [place] Level/Cues Needed (Toileting Goal 1, OT) independent  -SM     Time Frame (Toileting Goal 1, OT) short term goal (STG);2 weeks  -SM     Progress/Outcome (Toileting Goal 1, OT) goal ongoing  -       Row Name 08/20/23 1251          Therapy Assessment/Plan (OT)    Planned Therapy Interventions (OT) functional balance retraining;occupation/activity based interventions;patient/caregiver education/training;transfer/mobility retraining  -SM               User Key   (r) = Recorded By, (t) = Taken By, (c) = Cosigned By      Initials Name Provider Type     Rafaela Bhatti, SERA Occupational Therapist                   Clinical Impression       Row Name 08/20/23 1244          Pain Assessment    Pretreatment Pain Rating 0/10 - no pain  -     Pre/Posttreatment Pain Comment reports HA with activity and returned to bed  -     Pain Intervention(s) Repositioned;Rest  -[facility] Name 08/20/23 1240          Plan of Care Review    Plan of Care Reviewed With patient  -     Outcome Evaluation Pt is a 58 y.o male admitted to [facility] with dizziness and unsteady gait. He had only been home from the hospital a few hours after having a recent cerebellar stroke on 8/16. When pt left the hospital he reports he was getting up without difficulty but when he got home his symptoms had increased. He reports he had to sit down on the floor and was unable to walk up his flight of steps. He lives alone. Today pt with dizziness, decreased balance, and some safety awareness deficits largely limiting. His UEs are coordinated and has good strength. Pt is only able to take a few steps to the chair today CGA but then reports increased HA and dizziness and returned back to bed. Recommend continued OT to address deficits and progress independence with ADLs. He is not safe at this time to return back home alone.  -       Row Name 08/20/23 1246          Therapy Assessment/Plan (OT)    Rehab Potential (OT) good, to achieve stated therapy goals  -     Criteria for Skilled Therapeutic Interventions Met (OT) yes;meets criteria;skilled treatment is necessary  -     Therapy Frequency (OT) 5 times/wk  -       Row Name 08/20/23 1249          Therapy Plan Review/Discharge Plan (OT)    Anticipated Discharge Disposition (OT) inpatient rehabilitation facility  -[facility] Name 08/20/23 1246          Vital Signs    Pre Systolic BP Rehab 152  -SM     Pre Treatment Diastolic BP 98  -SM     Intra Systolic BP Rehab  152  -SM     Intra Treatment Diastolic   -SM     Post Systolic BP Rehab 149  -SM     Post Treatment Diastolic BP 95  -SM     Pre Patient Position Sitting  EOB  -SM     Intra Patient Position Sitting  in chair  -SM     Post Patient Position Supine  -SM       Row Name 08/20/23 1240          Positioning and Restraints    Pre-Treatment Position in bed  -SM     Post Treatment Position bed  -SM     In Bed fowlers;call light within reach;encouraged to call for assist;exit alarm on;notified nsg  -               User Key  (r) = Recorded By, (t) = Taken By, (c) = Cosigned By      Initials Name Provider Type    Rafaela Andino OT Occupational Therapist                   Outcome Measures       Row Name 08/20/23 1252          How much help from another is currently needed...    Putting on and taking off regular lower body clothing? 3  -SM     Bathing (including washing, rinsing, and drying) 3  -SM     Toileting (which includes using toilet bed pan or urinal) 3  -SM     Putting on and taking off regular upper body clothing 3  -SM     Taking care of personal grooming (such as brushing teeth) 3  -SM     Eating meals 4  -SM     AM-PAC 6 Clicks Score (OT) 19  -SM       Row Name 08/20/23 1226 08/20/23 0845       How much help from another person do you currently need...    Turning from your back to your side while in flat bed without using bedrails? 4  -ST 4  -MB    Moving from lying on back to sitting on the side of a flat bed without bedrails? 4  -ST 4  -MB    Moving to and from a bed to a chair (including a wheelchair)? 3  -ST 4  -MB    Standing up from a chair using your arms (e.g., wheelchair, bedside chair)? 4  -ST 3  -MB    Climbing 3-5 steps with a railing? 3  -ST 3  -MB    To walk in hospital room? 3  -ST 3  -MB    AM-PAC 6 Clicks Score (PT) 21  -ST 21  -MB    Highest level of mobility 6 --> Walked 10 steps or more  -ST 6 --> Walked 10 steps or more  -MB      Row Name 08/20/23 1252          Modified Cloud Scale     Modified [place] Scale 4 - Moderately severe disability.  Unable to walk without assistance, and unable to attend to own bodily needs without assistance.  -       Row Name 08/20/23 1252 08/20/23 1226       Functional Assessment    Outcome Measure Options AM-PAC 6 Clicks Daily Activity (OT)  - AM-PAC 6 Clicks Basic Mobility (PT)  -              User Key  (r) = Recorded By, (t) = Taken By, (c) = Cosigned By      Initials Name Provider Type    Rafaela Andino, OT Occupational Therapist    Brandi Roy, PT Physical Therapist    Roberta Noble RN Registered Nurse                    Occupational Therapy Education       Title: PT OT SLP Therapies (In Progress)       Topic: Occupational Therapy (In Progress)       Point: ADL training (Done)       Description:   Instruct learner(s) on proper safety adaptation and remediation techniques during self care or transfers.   Instruct in proper use of assistive devices.                  Learning Progress Summary             Patient Acceptance, E, VU by  at 8/20/2023 1252    Comment: OT POC, dc recommendations, safety with using call light for OOB/  ADL assist as pt is a falls risk                         Point: Home exercise program (Not Started)       Description:   Instruct learner(s) on appropriate technique for monitoring, assisting and/or progressing therapeutic exercises/activities.                  Learner Progress:  Not documented in this visit.              Point: Precautions (Not Started)       Description:   Instruct learner(s) on prescribed precautions during self-care and functional transfers.                  Learner Progress:  Not documented in this visit.              Point: Body mechanics (Not Started)       Description:   Instruct learner(s) on proper positioning and spine alignment during self-care, functional mobility activities and/or exercises.                  Learner Progress:  Not documented in this visit.                               User Key       Initials Effective Dates Name Provider Type Discipline     04/02/20 -  Rafaela Bhatti OT Occupational Therapist OT                  OT Recommendation and Plan  Planned Therapy Interventions (OT): functional balance retraining, occupation/activity based interventions, patient/caregiver education/training, transfer/mobility retraining  Therapy Frequency (OT): 5 times/wk  Plan of Care Review  Plan of Care Reviewed With: patient  Outcome Evaluation: Pt is a 58 y.o male admitted to [facility] with dizziness and unsteady gait. He had only been home from the hospital a few hours after having a recent cerebellar stroke on 8/16. When pt left the hospital he reports he was getting up without difficulty but when he got home his symptoms had increased. He reports he had to sit down on the floor and was unable to walk up his flight of steps. He lives alone. Today pt with dizziness, decreased balance, and some safety awareness deficits largely limiting. His UEs are coordinated and has good strength. Pt is only able to take a few steps to the chair today CGA but then reports increased HA and dizziness and returned back to bed. Recommend continued OT to address deficits and progress independence with ADLs. He is not safe at this time to return back home alone.     Time Calculation:   Evaluation Complexity (OT)  Review Occupational Profile/Medical/Therapy History Complexity: expanded/moderate complexity  Assessment, Occupational Performance/Identification of Deficit Complexity: 3-5 performance deficits  Clinical Decision Making Complexity (OT): detailed assessment/moderate complexity  Overall Complexity of Evaluation (OT): moderate complexity     Time Calculation- OT       Row Name 08/20/23 7248             Time Calculation- OT    OT Start Time 1050  -      OT Stop Time 1108  -      OT Time Calculation (min) 18 min  -      Total Timed Code Minutes- OT 10 minute(s)  -      OT Received On 08/20/23  -       OT - Next Appointment 08/21/23  -      OT Goal Re-Cert Due Date 09/03/23  -         Timed Charges    09310 - OT Therapeutic Activity Minutes 10  -SM         Untimed Charges    OT Eval/Re-eval Minutes 8  -SM         Total Minutes    Timed Charges Total Minutes 10  -SM      Untimed Charges Total Minutes 8  -SM       Total Minutes 18  -SM                User Key  (r) = Recorded By, (t) = Taken By, (c) = Cosigned By      Initials Name Provider Type     Rafaela Bhatti OT Occupational Therapist                  Therapy Charges for Today       Code Description Service Date Service Provider Modifiers Qty    20679268987 HC OT THERAPEUTIC ACT EA 15 MIN 8/20/2023 Rafaela Bhatti OT GO 1    [id number] HC OT EVAL MOD COMPLEXITY 2 8/20/2023 Rafaela Bhatti OT GO 1                 Rafaela Bhatti OT  8/20/2023

## 2023-08-21 ENCOUNTER — APPOINTMENT (OUTPATIENT)
Dept: CT IMAGING | Facility: HOSPITAL | Age: 58
DRG: 057 | End: 2023-08-21
Payer: COMMERCIAL

## 2023-08-21 PROCEDURE — 85732 THROMBOPLASTIN TIME PARTIAL: CPT | Performed by: NURSE PRACTITIONER

## 2023-08-21 PROCEDURE — 25010000002 ONDANSETRON PER 1 MG: Performed by: INTERNAL MEDICINE

## 2023-08-21 PROCEDURE — G0378 HOSPITAL OBSERVATION PER HR: HCPCS

## 2023-08-21 PROCEDURE — 92523 SPEECH SOUND LANG COMPREHEN: CPT

## 2023-08-21 PROCEDURE — 97530 THERAPEUTIC ACTIVITIES: CPT

## 2023-08-21 PROCEDURE — 85613 RUSSELL VIPER VENOM DILUTED: CPT | Performed by: NURSE PRACTITIONER

## 2023-08-21 PROCEDURE — 85670 THROMBIN TIME PLASMA: CPT | Performed by: NURSE PRACTITIONER

## 2023-08-21 PROCEDURE — 86147 CARDIOLIPIN ANTIBODY EA IG: CPT | Performed by: NURSE PRACTITIONER

## 2023-08-21 PROCEDURE — 99233 SBSQ HOSP IP/OBS HIGH 50: CPT | Performed by: NURSE PRACTITIONER

## 2023-08-21 PROCEDURE — 86146 BETA-2 GLYCOPROTEIN ANTIBODY: CPT | Performed by: NURSE PRACTITIONER

## 2023-08-21 PROCEDURE — 70450 CT HEAD/BRAIN W/O DYE: CPT

## 2023-08-21 PROCEDURE — 85598 HEXAGNAL PHOSPH PLTLT NEUTRL: CPT | Performed by: NURSE PRACTITIONER

## 2023-08-21 PROCEDURE — 85610 PROTHROMBIN TIME: CPT | Performed by: NURSE PRACTITIONER

## 2023-08-21 PROCEDURE — 25010000002 THIAMINE HCL 200 MG/2ML SOLUTION: Performed by: NURSE PRACTITIONER

## 2023-08-21 PROCEDURE — 85730 THROMBOPLASTIN TIME PARTIAL: CPT | Performed by: NURSE PRACTITIONER

## 2023-08-21 PROCEDURE — 97535 SELF CARE MNGMENT TRAINING: CPT

## 2023-08-21 RX ORDER — HYDROCHLOROTHIAZIDE 12.5 MG/1
12.5 TABLET ORAL DAILY
Status: DISCONTINUED | OUTPATIENT
Start: 2023-08-22 | End: 2023-08-24 | Stop reason: HOSPADM

## 2023-08-21 RX ORDER — MECLIZINE HYDROCHLORIDE 25 MG/1
25 TABLET ORAL 3 TIMES DAILY PRN
Status: DISCONTINUED | OUTPATIENT
Start: 2023-08-21 | End: 2023-08-24 | Stop reason: HOSPADM

## 2023-08-21 RX ADMIN — PANTOPRAZOLE SODIUM 40 MG: 40 TABLET, DELAYED RELEASE ORAL at 09:37

## 2023-08-21 RX ADMIN — VILAZODONE HYDROCHLORIDE 40 MG: 40 TABLET, FILM COATED ORAL at 09:37

## 2023-08-21 RX ADMIN — ONDANSETRON 4 MG: 2 INJECTION INTRAMUSCULAR; INTRAVENOUS at 08:28

## 2023-08-21 RX ADMIN — THIAMINE HYDROCHLORIDE 200 MG: 100 INJECTION, SOLUTION INTRAMUSCULAR; INTRAVENOUS at 06:22

## 2023-08-21 RX ADMIN — CLOPIDOGREL BISULFATE 75 MG: 75 TABLET, FILM COATED ORAL at 09:37

## 2023-08-21 RX ADMIN — MULTIPLE VITAMINS W/ MINERALS TAB 1 TABLET: TAB at 09:37

## 2023-08-21 RX ADMIN — CARVEDILOL 6.25 MG: 6.25 TABLET, FILM COATED ORAL at 17:39

## 2023-08-21 RX ADMIN — CARVEDILOL 6.25 MG: 6.25 TABLET, FILM COATED ORAL at 09:37

## 2023-08-21 RX ADMIN — THIAMINE HYDROCHLORIDE 200 MG: 100 INJECTION, SOLUTION INTRAMUSCULAR; INTRAVENOUS at 14:07

## 2023-08-21 RX ADMIN — SODIUM CHLORIDE 75 ML/HR: 9 INJECTION, SOLUTION INTRAVENOUS at 14:02

## 2023-08-21 RX ADMIN — LORAZEPAM 1 MG: 1 TABLET ORAL at 20:53

## 2023-08-21 RX ADMIN — HYDRALAZINE HYDROCHLORIDE 100 MG: 50 TABLET, FILM COATED ORAL at 14:07

## 2023-08-21 RX ADMIN — HYDRALAZINE HYDROCHLORIDE 100 MG: 50 TABLET, FILM COATED ORAL at 06:22

## 2023-08-21 RX ADMIN — ASPIRIN 81 MG: 81 TABLET, CHEWABLE ORAL at 09:37

## 2023-08-21 RX ADMIN — HYDRALAZINE HYDROCHLORIDE 100 MG: 50 TABLET, FILM COATED ORAL at 21:05

## 2023-08-21 RX ADMIN — PANTOPRAZOLE SODIUM 40 MG: 40 TABLET, DELAYED RELEASE ORAL at 17:39

## 2023-08-21 RX ADMIN — HYDROXYZINE HYDROCHLORIDE 25 MG: 25 TABLET ORAL at 14:07

## 2023-08-21 RX ADMIN — LISINOPRIL 40 MG: 40 TABLET ORAL at 09:37

## 2023-08-21 RX ADMIN — FOLIC ACID 1 MG: 1 TABLET ORAL at 09:37

## 2023-08-21 RX ADMIN — THIAMINE HYDROCHLORIDE 200 MG: 100 INJECTION, SOLUTION INTRAMUSCULAR; INTRAVENOUS at 20:53

## 2023-08-21 RX ADMIN — ATORVASTATIN CALCIUM 40 MG: 20 TABLET, FILM COATED ORAL at 20:53

## 2023-08-21 RX ADMIN — AMLODIPINE BESYLATE 5 MG: 5 TABLET ORAL at 09:37

## 2023-08-21 RX ADMIN — MECLIZINE HYDROCHLORIDE 25 MG: 25 TABLET ORAL at 14:07

## 2023-08-21 RX ADMIN — SPIRONOLACTONE 25 MG: 25 TABLET ORAL at 09:37

## 2023-08-21 NOTE — CASE MANAGEMENT/SOCIAL WORK
Case Management Discharge Note      Final Note: Home no additional dc orders noted for CCP. [facility]         Selected Continued Care - Discharged on 8/19/2023 Admission date: 8/16/2023 - Discharge disposition: Home or Self Care      Destination    No services have been selected for the patient.                Durable Medical Equipment    No services have been selected for the patient.                Dialysis/Infusion    No services have been selected for the patient.                Home Medical Care    No services have been selected for the patient.                Therapy    No services have been selected for the patient.                Community Resources    No services have been selected for the patient.                Community & DME    No services have been selected for the patient.                         Final Discharge Disposition Code: 01 - home or self-care

## 2023-08-21 NOTE — PROGRESS NOTES
[facility] Acute Rehab    Received call to make pt a full referral from the stroke screening list. Will see pt today to discuss Acute Rehab and continue to follow progress with therapy to help determine the most appropriate level of rehab needed at the time of dc    Lo Han RN  Acute Rehab Admission Nurse    [id number] spoke with pt. Acute vs subacute rehab discussed. Lives alone in 2nd story apt with 1.5 flights of steps to enter. Permission given to call son- attempted to call- unable to reach as his voicemail is not set up.

## 2023-08-21 NOTE — THERAPY TREATMENT NOTE
Patient Name: Flako Coreas  : 1965    MRN: [id number]                              Today's Date: 2023       Admit Date: 2023    Visit Dx:     ICD-10-CM ICD-9-CM   1. Ataxia  R27.0 781.3   2. History of cerebellar stroke  Z86.73 V12.54     Patient Active Problem List   Diagnosis    Mixed anxiety depressive disorder    Mixed hyperlipidemia    Essential hypertension    Diverticulosis    Nodule of spleen    Chronic bilateral lower abdominal pain    Lepe's esophagus without dysplasia    GERD (gastroesophageal reflux disease)    History of esophagitis    Dizziness    Acute CVA (cerebrovascular accident)    Hypertensive emergency    Ataxia     Past Medical History:   Diagnosis Date    ADHD (attention deficit hyperactivity disorder)     On going issue    Anxiety     Lepe's esophagus     Benign prostatic hyperplasia Surgery in 2021    Clotting disorder Intestinal    Depression     Diverticulitis     Diverticulosis     Duodenitis     Enteritis     Failure to thrive (child)     Family history of blood clots     GERD (gastroesophageal reflux disease)     Hyperlipidemia     Hypertension     Hypertensive emergency     Low testosterone     Microcytosis     Pancreatitis     Pneumonia     PONV (postoperative nausea and vomiting)     Seasonal affective disorder     Shoulder injury     Stroke     Unexplained weight loss      Past Surgical History:   Procedure Laterality Date    COLON SURGERY      COLONOSCOPY      COLONOSCOPY N/A 2022    Procedure: COLONOSCOPY INTO CECUM WITH HOT SNARE POLYPECTOMY;  Surgeon: Albert Gallo MD;  Location: [facility] ENDOSCOPY;  Service: Gastroenterology;  Laterality: N/A;  PRE- GI BLEED  POST- DIVERTICULOSIS, POLYP    ENDOSCOPY N/A 12/10/2022    Procedure: ESOPHAGOGASTRODUODENOSCOPY;  Surgeon: Albert Gallo MD;  Location: [facility] ENDOSCOPY;  Service: Gastroenterology;  Laterality: N/A;  PRE- DARK STOOLS  POST- BARRETTS ESOPHAGUS    FRACTURE SURGERY      for broken  arm    FRACTURE SURGERY      for broken hand    INCISION AND DRAINAGE ABSCESS  2015    anal    PROSTATE SURGERY      SMALL INTESTINE SURGERY      TONSILLECTOMY        General Information       Row Name 08/21/23 1538          OT Time and Intention    Document Type therapy note (daily note)  -LE     Mode of Treatment individual therapy;occupational therapy  -       Row Name 08/21/23 1538          General Information    Existing Precautions/Restrictions fall  -       Row Name 08/21/23 1538          Cognition    Orientation Status (Cognition) oriented x 4  -       Row Name 08/21/23 1538          Safety Issues, Functional Mobility    Impairments Affecting Function (Mobility) balance  -LE     Comment, Safety Issues/Impairments (Mobility) dizziness. gait belt and non skid sock worn.  -LE               User Key  (r) = Recorded By, (t) = Taken By, (c) = Cosigned By      Initials Name Provider Type    Angela Aguilera OTR Occupational Therapist                     Mobility/ADL's       Row Name 08/21/23 1538          Bed Mobility    Supine-Sit Russell (Bed Mobility) standby assist  -LE     Sit-Supine Russell (Bed Mobility) standby assist  -       Row Name 08/21/23 1538          Transfers    Transfers sit-stand transfer;stand-sit transfer  -LE     Comment, (Transfers) xfers to shower chair placed at sink.  -       Row Name 08/21/23 1538          Sit-Stand Transfer    Sit-Stand Russell (Transfers) standby assist;contact guard  -LE     Assistive Device (Sit-Stand Transfers) walker, front-wheeled  -LE       Row Name 08/21/23 1538          Stand-Sit Transfer    Stand-Sit Russell (Transfers) standby assist;contact guard  -LE     Assistive Device (Stand-Sit Transfers) walker, front-wheeled  -LE       Row Name 08/21/23 1538          Functional Mobility    Functional Mobility- Ind. Level contact guard assist  -LE     Functional Mobility- Device walker, front-wheeled  -LE     Functional Mobility- Comment  bed to bathroom to bed.  cues to slow down and turn with walker.  -[facility] Name 08/21/23 1538          Activities of Daily Living    BADL Assessment/Intervention bathing;upper body dressing;lower body dressing;grooming;feeding;toileting  -[facility] Name 08/21/23 1538          Grooming Assessment/Training    [place] Level (Grooming) wash face, hands;oral care regimen;set up;standby assist  -LE     Position (Grooming) sink side;unsupported sitting  -[facility] Name 08/21/23 1538          Lower Body Dressing Assessment/Training    Comment, (Lower Body Dressing) reaches down to adjust socks when sitting EOB with SBA  -LE               User Key  (r) = Recorded By, (t) = Taken By, (c) = Cosigned By      Initials Name Provider Type    Angela Aguilera OTR Occupational Therapist                   Obj/Interventions       [facility] Name 08/21/23 1540          Range of Motion Comprehensive    Comment, General Range of Motion B UE functional.  -LE       Row Name 08/21/23 1540          Strength Comprehensive (MMT)    Comment, General Manual Muscle Testing (MMT) Assessment B UE strength equal 5/5.  -[facility] Name 08/21/23 1540          Balance    Comment, Balance sits SBA.  stand at walker with SBA.  CGA on turns with walker.  -               User Key  (r) = Recorded By, (t) = Taken By, (c) = Cosigned By      Initials Name Provider Type    Angela Aguilera OTR Occupational Therapist                   Goals/Plan    No documentation.                  Clinical Impression       [facility] Name 08/21/23 1536          Pain Assessment    Pretreatment Pain Rating 0/10 - no pain  -LE       Row Name 08/21/23 1536          Plan of Care Review    Plan of Care Reviewed With patient  -LE     Outcome Evaluation Pt seen by OT.  Pt reports dizziness both at supine and in standing but best while supine.  Pt agrees to walk to BR to complete grooming.  Felt best using walker due to dizziness.  Pt sits to groom at sink with set up to brush  teeth and wash hands.  Pt returns to bed.  Will cont to follow for skilled OT.  -LE       Row Name 08/21/23 1536          Vital Signs    O2 Delivery Pre Treatment room air  -LE     Pre Patient Position Supine  -LE     Intra Patient Position Standing  -LE     Post Patient Position Supine  -LE       Row Name 08/21/23 1536          Positioning and Restraints    Pre-Treatment Position in bed  -LE     Post Treatment Position bed  -LE     In Bed notified nsg;fowlers;call light within reach;encouraged to call for assist;exit alarm on  -LE               User Key  (r) = Recorded By, (t) = Taken By, (c) = Cosigned By      Initials Name Provider Type    Angela Aguilera OTR Occupational Therapist                   Outcome Measures       Row Name 08/21/23 1541          How much help from another is currently needed...    Putting on and taking off regular lower body clothing? 3  -LE     Bathing (including washing, rinsing, and drying) 3  -LE     Toileting (which includes using toilet bed pan or urinal) 3  -LE     Putting on and taking off regular upper body clothing 3  -LE     Taking care of personal grooming (such as brushing teeth) 3  -LE     Eating meals 4  -LE     AM-PAC 6 Clicks Score (OT) 19  -LE       Row Name 08/21/23 0920          How much help from another person do you currently need...    Turning from your back to your side while in flat bed without using bedrails? 4  -MR     Moving from lying on back to sitting on the side of a flat bed without bedrails? 4  -MR     Moving to and from a bed to a chair (including a wheelchair)? 3  -MR     Standing up from a chair using your arms (e.g., wheelchair, bedside chair)? 4  -MR     Climbing 3-5 steps with a railing? 3  -MR     To walk in hospital room? 3  -MR     AM-PAC 6 Clicks Score (PT) 21  -MR     Highest level of mobility 6 --> Walked 10 steps or more  -MR       Row Name 08/21/23 1541          Functional Assessment    Outcome Measure Options AM-PAC 6 Clicks Daily  Activity (OT)  -LE               User Key  (r) = Recorded By, (t) = Taken By, (c) = Cosigned By      Initials Name Provider Type    Angela Aguilera OTR Occupational Therapist    MR Danna uGidry, RN Registered Nurse                    Occupational Therapy Education       Title: PT OT SLP Therapies (In Progress)       Topic: Occupational Therapy (In Progress)       Point: ADL training (Done)       Description:   Instruct learner(s) on proper safety adaptation and remediation techniques during self care or transfers.   Instruct in proper use of assistive devices.                  Learning Progress Summary             Patient Acceptance, E, VU by  at 8/20/2023 1252    Comment: OT POC, dc recommendations, safety with using call light for OOB/  ADL assist as pt is a falls risk                         Point: Home exercise program (Not Started)       Description:   Instruct learner(s) on appropriate technique for monitoring, assisting and/or progressing therapeutic exercises/activities.                  Learner Progress:  Not documented in this visit.              Point: Precautions (Not Started)       Description:   Instruct learner(s) on prescribed precautions during self-care and functional transfers.                  Learner Progress:  Not documented in this visit.              Point: Body mechanics (Not Started)       Description:   Instruct learner(s) on proper positioning and spine alignment during self-care, functional mobility activities and/or exercises.                  Learner Progress:  Not documented in this visit.                              User Key       Initials Effective Dates Name Provider Type [facility] 04/02/20 -  Rafaela Bhatti OT Occupational Therapist OT                  OT Recommendation and Plan     Plan of Care Review  Plan of Care Reviewed With: patient  Outcome Evaluation: Pt seen by OT.  Pt reports dizziness both at supine and in standing but best while supine.  Pt agrees to  walk to BR to complete grooming.  Felt best using walker due to dizziness.  Pt sits to groom at sink with set up to brush teeth and wash hands.  Pt returns to bed.  Will cont to follow for skilled OT.     Time Calculation:         Time Calculation- OT       Row Name 08/21/23 1541             Time Calculation- OT    OT Start Time 1517  -LE      OT Stop Time 1530  -LE      OT Time Calculation (min) 13 min  -LE      Total Timed Code Minutes- OT 13 minute(s)  -LE      OT Received On 08/21/23  -LE      OT - Next Appointment 08/22/23  -LE         Timed Charges    92654 - OT Self Care/Mgmt Minutes 13  -LE         Total Minutes    Timed Charges Total Minutes 13  -LE       Total Minutes 13  -LE                User Key  (r) = Recorded By, (t) = Taken By, (c) = Cosigned By      Initials Name Provider Type    Angela Aguilera OTR Occupational Therapist                  Therapy Charges for Today       Code Description Service Date Service Provider Modifiers Qty    [id number] HC OT SELF CARE/MGMT/TRAIN EA 15 MIN 8/21/2023 Angela De Jesus OTR GO 1                 BEATA Forrest  8/21/2023

## 2023-08-21 NOTE — THERAPY EVALUATION
Acute Care - Speech Language Pathology Initial Evaluation  [facility]     Patient Name: Flako Coreas  : 1965  MRN: [id number]  Today's Date: 2023               Admit Date: 2023     Visit Dx:    ICD-10-CM ICD-9-CM   1. Ataxia  R27.0 781.3   2. History of cerebellar stroke  Z86.73 V12.54     Patient Active Problem List   Diagnosis    Mixed anxiety depressive disorder    Mixed hyperlipidemia    Essential hypertension    Diverticulosis    Nodule of spleen    Chronic bilateral lower abdominal pain    Lepe's esophagus without dysplasia    GERD (gastroesophageal reflux disease)    History of esophagitis    Dizziness    Acute CVA (cerebrovascular accident)    Hypertensive emergency    Ataxia     Past Medical History:   Diagnosis Date    ADHD (attention deficit hyperactivity disorder)     On going issue    Anxiety     Lepe's esophagus     Benign prostatic hyperplasia Surgery in 2021    Clotting disorder Intestinal    Depression     Diverticulitis     Diverticulosis     Duodenitis     Enteritis     Failure to thrive (child)     Family history of blood clots     GERD (gastroesophageal reflux disease)     Hyperlipidemia     Hypertension     Hypertensive emergency     Low testosterone     Microcytosis     Pancreatitis     Pneumonia     PONV (postoperative nausea and vomiting)     Seasonal affective disorder     Shoulder injury     Stroke     Unexplained weight loss      Past Surgical History:   Procedure Laterality Date    COLON SURGERY      COLONOSCOPY      COLONOSCOPY N/A 2022    Procedure: COLONOSCOPY INTO CECUM WITH HOT SNARE POLYPECTOMY;  Surgeon: Albert Gallo MD;  Location: [facility] ENDOSCOPY;  Service: Gastroenterology;  Laterality: N/A;  PRE- GI BLEED  POST- DIVERTICULOSIS, POLYP    ENDOSCOPY N/A 12/10/2022    Procedure: ESOPHAGOGASTRODUODENOSCOPY;  Surgeon: Albert Gallo MD;  Location: [facility] ENDOSCOPY;  Service: Gastroenterology;  Laterality: N/A;  PRE- DARK STOOLS  POST-  BARRETTS ESOPHAGUS    FRACTURE SURGERY  1980    for broken arm    FRACTURE SURGERY      for broken hand    INCISION AND DRAINAGE ABSCESS  2015    anal    PROSTATE SURGERY      SMALL INTESTINE SURGERY      TONSILLECTOMY         SLP Recommendation and Plan  Overall clinical impression: mild cognitive impairment     Pt amenable to plan of care implementation for cognition including functional memory, problem solving, and home management for return to all prior roles/activities. Will follow.       SLP EVALUATION (last 72 hours)       SLP SLC Evaluation       Row Name 08/21/23 1000       Communication Assessment/Intervention    Document Type evaluation  -AB    Subjective Information no complaints  -AB    Patient Observations alert;cooperative;agree to therapy  -AB    Patient/Family/Caregiver Comments/Observations seen in p596  -AB    Patient Effort good  -AB    Symptoms Noted During/After Treatment none  -AB       General Information    Patient Profile Reviewed yes  -AB    Pertinent History Of Current Problem 58 y.o. white male with known diagnosis of uncontrolled hypertension, mixed hyperlipidemia, alcohol use, recent left cerebellar stroke with residual gait imbalance who got discharged home yesterday then few hours later his symptoms/wobbly gait got worse and he presented again to the hospital, he stated that he was veering to the left side during walking and that felt worse than his gait prior to the discharge and he decided to come back to the hospital.  On presentation he was found to have a blood pressure that running high 140s-170s/90s to 130s diastolic, he denied other focal neurological symptoms.  He was discharged on dual antiplatelet therapy with aspirin 81 mg, Plavix 75 mg daily for 21 days, Lipitor 80 mg daily.  His blood pressure he was discharged on hydralazine 1000 mg 3 times daily, amlodipine 5 mg daily, lisinopril 40 mg daily, spironolactone 25 mg daily.  On neurological examination right now he is at  baseline with NIH score of 0, he had an abnormal gait with veering to the left side.  He states that he drinks 3-4 beers a day, denied smoking or drug illicit.  -AB    Precautions/Limitations, Vision other (see comments)  reports blurred vision  -AB    Precautions/Limitations, Hearing WFL  -AB    Patient Level of Education social hx: (lives alone in apartment, recent -lost job 3 weeks prior, independent for driving and all IADLS).  -AB    Prior Level of Function-Communication WFL  -AB    Plans/Goals Discussed with patient;agreed upon  -AB    Barriers to Rehab none identified  -AB    Patient's Goals for Discharge return to all previous roles/activities  -AB    Standardized Assessment Used MMSE  -AB       Cognitive Assessment Intervention- SLP    Cognitive Function (Cognition) mild impairment  -AB    Cognition, Comment MMSE breakdown: 3/5 temporal orientation, 4/5 spatial orientation, 3/3 immediate recall, 4/5 serial 7's, 2/3 delayed recall, 2/2 naming, 1/1 repetition, 3/3 multi step directions, 1/1 reading, 0/1 sentences, 0/1 figural copy. Overall score, 23/30. With level of education, score may indicate mild cognitive impairment.  -AB       SLP Evaluation Clinical Impressions    SLP Diagnosis mild;cognitive-linguistic disorder  -AB    Rehab Potential/Prognosis good  -AB    SLC Criteria for Skilled Therapy Interventions Met yes  -AB    Functional Impact difficulty in expressing complex messages;decreased ability to respond to situations safely;difficulty completing home management task  -AB       SLP Treatment Clinical Impressions    Care Plan Review evaluation/treatment results reviewed;care plan/treatment goals reviewed;risks/benefits reviewed;patient/other agree to care plan;current/potential barriers reviewed  -AB       Recommendations    Therapy Frequency (SLP SLC) PRN  -AB    Predicted Duration Therapy Intervention (Days) until discharge  -AB    Anticipated Discharge Disposition (SLP) anticipate  therapy at next level of care  -AB       Communication Treatment Objective and Progress Goals (SLP)    SLC LTGs Patient will demonstrate functional cognitive-linguistic skills for return to discharge environment  -AB    Cognitive Linguistic Treatment Objectives Cognitive Linguistic Treatment Objectives (Group)  -AB       Patient will demonstrate functional cognitive-linguistic skills for return to discharge environment    [place] Independently  -AB    Time frame by discharge  -AB    Progress/Outcomes new goal  -AB       Cognitive Linguistic Treatment Objectives    Attention Selection attention, SLP goal 1  -AB    Memory Skills Selection memory skills, SLP goal 1  -AB    Problem Solving Selection problem solving, SLP goal 1  -AB    Executive Function Skills Selection executive function skills, SLP goal 1  -AB       Attention Goal 1 (SLP)    Improve Attention by Goal 1 (SLP) complete sustained attention task;90%;independently (over 90% accuracy)  -AB    Time Frame (Attention Goal 1, SLP) short term goal (STG)  -AB    Progress/Outcomes (Attention Goal 1, SLP) new goal  -AB       Memory Skills Goal 1 (SLP)    Improve Memory Skills Through Goal 1 (SLP) use memory strategies;90%;independently (over 90% accuracy)  -AB    Time Frame (Memory Skills Goal 1, SLP) short term goal (STG)  -AB    Progress/Outcomes (Memory Skills Goal 1, SLP) new goal  -AB       Functional Problem Solving Skills Goal 1 (SLP)    Improve Problem Solving Through Goal 1 (SLP) determine solutions to simple ADL/safety problems;determine solutions to complex problems;90%;independently (over 90% accuracy)  -AB    Time Frame (Problem Solving Goal 1, SLP) short term goal (STG)  -AB    Progress/Outcomes (Problem Solving Goal 1, SLP) new goal  -AB       Executive Functional Skills Goal 1 (SLP)    Improve Executive Function Skills Goal 1 (SLP) home management activity;80%;with minimal cues (75-90%)  -AB    Time Frame (Executive Function Skills Goal 1, SLP)  short term goal (STG)  -AB    Progress/Outcomes (Executive Function Skills Goal 1, SLP) new goal  -AB              User Key  (r) = Recorded By, (t) = Taken By, (c) = Cosigned By      Initials Name Effective Dates    Mari King, MS CCC-SLP 12/27/22 -                        EDUCATION  The patient has been educated in the following areas:     Cognitive Impairment.           SLP GOALS       Row Name 08/21/23 1000             Patient will demonstrate functional cognitive-linguistic skills for return to discharge environment    [place] Independently  -AB      Time frame by discharge  -AB      Progress/Outcomes new goal  -AB         Attention Goal 1 (SLP)    Improve Attention by Goal 1 (SLP) complete sustained attention task;90%;independently (over 90% accuracy)  -AB      Time Frame (Attention Goal 1, SLP) short term goal (STG)  -AB      Progress/Outcomes (Attention Goal 1, SLP) new goal  -AB         Memory Skills Goal 1 (SLP)    Improve Memory Skills Through Goal 1 (SLP) use memory strategies;90%;independently (over 90% accuracy)  -AB      Time Frame (Memory Skills Goal 1, SLP) short term goal (STG)  -AB      Progress/Outcomes (Memory Skills Goal 1, SLP) new goal  -AB         Functional Problem Solving Skills Goal 1 (SLP)    Improve Problem Solving Through Goal 1 (SLP) determine solutions to simple ADL/safety problems;determine solutions to complex problems;90%;independently (over 90% accuracy)  -AB      Time Frame (Problem Solving Goal 1, SLP) short term goal (STG)  -AB      Progress/Outcomes (Problem Solving Goal 1, SLP) new goal  -AB         Executive Functional Skills Goal 1 (SLP)    Improve Executive Function Skills Goal 1 (SLP) home management activity;80%;with minimal cues (75-90%)  -AB      Time Frame (Executive Function Skills Goal 1, SLP) short term goal (STG)  -AB      Progress/Outcomes (Executive Function Skills Goal 1, SLP) new goal  -AB                User Key  (r) = Recorded By, (t) = Taken  By, (c) = Cosigned By      Initials Name Provider Type    Mari King, MS CCC-SLP Speech and Language Pathologist                            Time Calculation:      Time Calculation- SLP       Row Name 08/21/23 1051             Time Calculation- SLP    SLP Received On 08/21/23  -AB                User Key  (r) = Recorded By, (t) = Taken By, (c) = Cosigned By      Initials Name Provider Type    Mari King, MS CCC-SLP Speech and Language Pathologist                    Therapy Charges for Today       Code Description Service Date Service Provider Modifiers Qty    [id number]  ST EVAL SPEECH AND PROD W LANG  3 8/21/2023 Mari Torres, MS CCC-SLP GN 1                       Mari Torres MS CCC-SLP  8/21/2023

## 2023-08-21 NOTE — PROGRESS NOTES
"DOS: 2023  NAME: Flako Coreas   : 1965  PCP: Akash Rodriguez Jr.,   Chief Complaint   Patient presents with    Dizziness     Stroke    Subjective: c/o vertigo with worsening gait since last night. Notes h/a as well. Denies double vision, focal weakness/numbness. Pt seen in follow up today, however the problem is new to the examiner.      Objective:  Vital signs: /86 (BP Location: Right arm, Patient Position: Lying)   Pulse 72   Temp 97.7 øF (36.5 øC) (Oral)   Resp 20   Ht 187.9 cm (73.98\")   Wt 102 kg (225 lb)   SpO2 96%   BMI 28.91 kg/mý       General appearance: Well developed, well nourished, well groomed, alert and cooperative.   HEENT: Normocephalic.   Neck: Supple  Cardiac: Regular rate and rhythm. No murmurs.   Peripheral Vasculature: Radial pulses are equal and symmetric.  Chest Exam: Clear to auscultation bilaterally, no wheezes, no rhonchi.  Extremities: Normal, no edema.   Skin: No rashes or birthmarks.     Higher integrative function: Oriented to time, place, person, intact recent and remote memory, attention span, concentration and language. Spontaneous speech, fund of vocabulary are normal.   CN II: Normal visual fields.   CN III IV VI: Extraocular movements are full without nystagmus. Pupils are equal, round, and reactive to light.   CN V: Normal facial sensation.  CN VII: Facial movements are symmetric, no weakness.   CN VIII: Auditory acuity is normal.   CN IX & X: Symmetric palatal movement.   CN XI: Sternocleidomastoid and trapezius are normal. No weakness.   CN XII: The tongue is midline. No atrophy or fasciculations.   Motor: Normal muscle strength, bulk, and tone in upper and lower extremities. No fasciculations, rigidity, spasticity or abnormal movements.   Sensation: Normal light touch in all extremities.   Station and gait: Deferred.  Coordination: Finger to nose test showed no dysmetria. Rapid alternating movements were normal. Heel to shin normal. "     Scheduled Meds:amLODIPine, 5 mg, Oral, Daily  aspirin, 81 mg, Oral, Daily  atorvastatin, 40 mg, Oral, Nightly  carvedilol, 6.25 mg, Oral, BID With Meals  clopidogrel, 75 mg, Oral, Daily  folic acid, 1 mg, Oral, Daily  hydrALAZINE, 100 mg, Oral, Q8H  lisinopril, 40 mg, Oral, Daily  multivitamin with minerals, 1 tablet, Oral, Daily  pantoprazole, 40 mg, Oral, BID AC  spironolactone, 25 mg, Oral, Daily  thiamine (B-1) IV, 200 mg, Intravenous, Q8H   Followed by  [START ON 8/25/2023] thiamine, 100 mg, Oral, Daily  vilazodone, 40 mg, Oral, Daily      Continuous Infusions:sodium chloride, 75 mL/hr, Last Rate: 75 mL/hr (08/20/23 1302)      PRN Meds:.  acetaminophen **OR** acetaminophen    bisacodyl    hydrOXYzine    LORazepam **OR** LORazepam **OR** LORazepam **OR** LORazepam **OR** LORazepam **OR** LORazepam    Magnesium Standard Dose Replacement - Follow Nurse / BPA Driven Protocol    meclizine    ondansetron    sodium chloride    Laboratory results:  Lab Results   Component Value Date    GLUCOSE 94 08/20/2023    CALCIUM 8.8 08/20/2023     08/20/2023    K 4.0 08/20/2023    CO2 22.1 08/20/2023     (H) 08/20/2023    BUN 20 08/20/2023    CREATININE 1.08 08/20/2023    EGFRIFAFRI >60 09/28/2018    EGFRIFNONA 98 11/05/2018    BCR 18.5 08/20/2023    ANIONGAP 9.9 08/20/2023     Lab Results   Component Value Date    WBC 7.35 08/20/2023    HGB 14.4 08/20/2023    HCT 43.8 08/20/2023    MCV 80.8 08/20/2023     08/20/2023     Lab Results   Component Value Date    CHOL 117 08/20/2023    CHOL 194 08/16/2023     Lab Results   Component Value Date    HDL 34 (L) 08/20/2023    HDL 41 08/16/2023    HDL 46 05/11/2022     Lab Results   Component Value Date    LDL 62 08/20/2023     (H) 08/16/2023     (H) 05/11/2022     Lab Results   Component Value Date    TRIG 115 08/20/2023    TRIG 194 (H) 08/16/2023    TRIG 149 05/11/2022         Lab 08/16/23  1458   HEMOGLOBIN A1C 5.20      Review and interpretation of  imaging MRI brain images viewed by me, shows cluster of left cerebellar strokes  Adult Transthoracic Echo Complete W/ Cont if Necessary Per Protocol (With Agitated Saline)    Result Date: 8/17/2023    Left ventricular systolic function is normal. Left ventricular ejection fraction appears to be 66 - 70%.   Left ventricular wall thickness is consistent with mild concentric hypertrophy.   Left ventricular diastolic function is consistent with (grade I) impaired relaxation.   Normal right ventricular cavity size and systolic function noted.   The left atrial cavity is mildly dilated.   Saline test results are negative.   There is no evidence of pericardial effusion.     CT Head Without Contrast    Result Date: 8/21/2023  CT SCAN OF THE HEAD WITHOUT CONTRAST ON 08/21/2023  CLINICAL HISTORY: Nonspecific dizziness.  TECHNIQUE: Spiral CT images were obtained from the base of the skull to the vertex without intravenous contrast. Images were reformatted and submitted 3 mm thick axial, sagittal and coronal CT sections with brain algorithm.  This is correlated to a recent MRI of the brain on 08/16/2023, just 5 days ago, as well as a CT angiogram of the head and neck and CT perfusion study of the brain that was performed on 08/19/2023, just 2 days ago, and an MRI of the brain yesterday 08/20/2023.  FINDINGS: On the MRI of the brain on 08/16/2023, there were 2 separate tiny 5 mm acute infarcts in the left cerebellum, one in the inferior medial left cerebellum and one in the inferior left cerebellar white matter in the left PICA territory, and on subsequent MRI of the brain 4 days later on 08/20/2023, there were multiple tiny acute infarcts in the left cerebellum with at least 9 separate tiny 3 to 6 mm acute infarcts involving the mid and inferior medial left cerebellum, all in the left PICA territory. Unfortunately on this head CT, these infarcts are too small to appreciate. Clearly they were present on prior MRIs. There is  minimal low-density in the periventricular white matter consistent with minimal small vessel disease. The remainder of the brain parenchyma is normal in attenuation. The ventricles are normal in size. I see no mass effect. There is no midline shift. No extra-axial fluid collections are identified. There is no evidence of acute intracranial hemorrhage. The calvarium and the skull base are normal in appearance. The paranasal sinuses and the mastoid air cells and middle ear cavities are clear.       1. Unfortunately, the 9 tiny 3 to 6 mm acute infarcts in the mid to inferior medial left cerebellum within the left PICA territory seen on yesterday's MRI of the brain 08/20/2023,  are too small to appreciate on the current head CT, but they were clearly present on yesterday's MRI of the brain. Other than minimal small vessel disease in the cerebral white matter, th remainder of the head CT is within normal limits.  Radiation dose reduction techniques were utilized, including automated exposure control and exposure modulation based on body size.   This report was finalized on 8/21/2023 9:54 AM by Dr. Miguel Turner M.D.      CT Head Without Contrast    Result Date: 8/18/2023  CT HEAD WITHOUT CONTRAST  CLINICAL HISTORY: Dizziness. Followup infarcts.  TECHNIQUE: CT scan of the head was obtained with 3 mm axial soft tissue algorithm images. No intravenous contrast was administered. Sagittal and coronal reconstructions were obtained.  COMPARISON: Brain MRI dated 08/16/2023.  FINDINGS:   On the previous brain MRI, there were 2 small foci of acute to subacute infarction noted within the inferomedial aspect of the left cerebellar hemisphere that are within the left PICA distribution. The largest of these measures up to approximately 8 mm in diameter. These infarcts are not well delineated on this CT examination. No new intracranial abnormality is appreciated. There is no evidence for hemorrhagic transformation. Old lacunar disease  is seen within the right thalamus and there are mild changes of chronic small vessel ischemic phenomenon.  The ventricles, sulci, and cisterns are age-appropriate. Incidental atherosclerotic calcifications are seen within the intracranial vasculature.  Incidental note is made of mild mucosal thickening within the left maxillary sinus and the ethmoid air cells.       On the prior MRI of the brain dated 08/16/2023, there were 2 subcentimeter foci of acute to subacute infarction within the inferior medial aspect left cerebellar hemisphere within the left PICA distribution. These infarcts are not well delineated on this noncontrast head CT. However, no new intracranial abnormality is identified on this head CT and there is no evidence for hemorrhagic transformation at the sites of acute to subacute infarction.  Incidental note is made of mild changes of chronic small vessel ischemic phenomenon and old lacunar disease within the right thalamus.   Radiation dose reduction techniques were utilized, including automated exposure control and exposure modulation based on body size.    This report was finalized on 8/18/2023 3:25 PM by Dr. Aniceto Deleon M.D.      CT Angiogram Neck    Result Date: 8/19/2023  CT ANGIOGRAM OF THE HEAD AND NECK AND CT PERFUSION STUDY  CLINICAL HISTORY: Difficulty standing up. Recent cerebellar infarct.  TECHNIQUE: A noncontrast head CT was performed with 3 mm axial images. Thereafter, a CT perfusion study was performed after the dynamic bolus of IV contrast. Standard perfusion maps were constructed with RAPID software. A CT angiogram of the head and neck was then performed with 1 mm axial images. Sagittal, coronal, and 3-dimensional reconstructed images were obtained. Finally, a delayed postcontrast head CT was performed with 3 mm axial images. AI analysis of LVO was utilized.  COMPARISON: CT angiogram of the head and neck dated 08/16/2023 and CT head dated 08/18/2023.  FINDINGS:  NONCONTRAST HEAD  CT: On the previous MRI of the brain dated 08/16/2023, there were 2 small foci of acute infarction noted within the inferior aspect of the left cerebellar hemisphere within the left PICA distribution. These are not well delineated on this CT examination. There is no convincing new area of acute infarction on the head CT.  Incidental note is made of partial opacification of the left mastoid air cells which was also seen on the prior head CT dated 08/18/2023.  CT PERFUSION STUDY: There is no abnormality in the CBF less than 30% or the time to maximal enhancement greater than 6 second maps. However, there are some areas of diminished perfusion within the left occipital lobe on the time to maximum enhancement greater than 4 second maps and measures up to 13 cc.  CTA NECK: There is a classic configuration of the aortic arch. There is no significant great vessel origin stenosis. A mild degree of stenosis is appreciated at the origin of the dominant right vertebral artery. Atherosclerotic changes are identified within the carotid bifurcations and proximal internal carotids. However, strictly speaking, there is no significant NASCET stenosis. Again, no significant interval change is seen in the CT angiogram of the neck when compared to the prior study dated 08/16/2023.  CTA HEAD: There is mild to moderate atherosclerotic irregularity and narrowing noted within the V4 segment of the right vertebral artery as well as the basilar artery. The basilar artery is unremarkable in appearance. The posterior cerebral arteries are unremarkable. Atherosclerotic calcifications are appreciated within the carotid siphons resulting in only mild degrees of luminal compromise. The middle and anterior cerebral arteries are unremarkable in appearance. When compared to the prior CT angiogram of the head, there is no significant interval change  DELAYED POSTCONTRAST HEAD CT: No abnormal foci of contrast enhancement are identified within the brain  parenchyma.      There is no abnormality on the CBF less than 30% or the time to maximum enhancement greater than 6 seconds perfusion maps. However, there are some areas of diminished perfusion within the left occipital lobe on the time to maximum enhancement greater than 4 second maps measuring up to 13 cc that are within the left PCA distribution. The findings are compatible with an area of low level diminished perfusion within the left occipital lobe. No prior perfusion study was performed and a new small acute infarct within the left occipital lobe could not be excluded. Further evaluation could be performed with an MRI of the brain for much more sensitive and specific area of acute infarction.  There is no significant interval change in the CT angiogram of the head and neck when compared to the prior study dated 08/16/2023. There is mild to moderate atherosclerotic irregularity and narrowing of the V4 segment of the right vertebral artery as well as the basilar artery. There is a moderate degree of stenosis within the origin of the right vertebral artery. Atherosclerotic changes are identified within the internal carotid arteries. However, there is no significant NASCET stenosis.  These findings were contemporaneously discussed with Dr. Kraus.  Radiation dose reduction techniques were utilized, including automated exposure control and exposure modulation based on body size.   This report was finalized on 8/19/2023 6:31 PM by Dr. Aniceto Deleon M.D.      MRI Brain Without Contrast    Result Date: 8/20/2023  MRI OF THE BRAIN WITHOUT CONTRAST  CLINICAL HISTORY: Slurred speech, dizziness, gait issues, nausea and vomiting.  TECHNIQUE: MRI of the brain was obtained with sagittal T1, axial T1, axial FLAIR, axial T2, axial diffusion, and axial susceptibility weighted images.  COMPARISON:  Comparison is made to previous MRI of the brain dated 08/16/2023.  FINDINGS:  There are multiple small foci of acute infarction  identified within the inferior aspect of the left cerebellar hemisphere that are within the left PICA distribution. The largest of these infarcts measures up to approximately 9 mm in diameter. Some of these infarcts are newly apparent when compared to the prior study dated 08/16/2023 although the current examination was performed on a 3 Rosa MRI unit as compared to the 1.5 Roas MRI unit on the prior examination. As such, the current implemented diffusion weighted sequence is more sensitive. Therefore, it is difficult to assess whether these are truly new foci of infarction.  Otherwise, the ventricles, sulci, and cisterns are age-appropriate. There are mild changes of chronic small vessel ischemic phenomenon. Old lacunar disease is seen within the right thalamus. The midline intracranial anatomy is unremarkable. Incidental note is made of a chronic microhemorrhage within the corticomedullary interface of the left superior frontal gyrus which is probably at the site of underlying amyloid. The major intracranial flow related signal voids are unremarkable.  There is mild mucosal thickening appreciated within the maxillary sinuses and the ethmoid air cells.  There is a small amount of fluid signal intensity within the left mastoid air cells that is statistically likely representative of an incidental mastoid effusion.       There is a small collection of numerous small foci of acute infarction within the inferior and medial aspect of the left cerebellar hemisphere within the left PICA distribution. A larger number of infarcts are appreciated on the current exam although it is difficult to assess whether these are truly new foci of acute infarction as the current study was performed on a more sensitive 3 Rosa MRI unit as compared to the previously implemented 1.5 Rosa MRI machine. As such, the diffusion-weighted imaging sequence is more sensitive on the current examination.  These findings were discussed with   Borte on 08/20/2023 at approximately 3:54 PM.   This report was finalized on 8/20/2023 4:18 PM by Dr. Aniceto Deleon M.D.      XR Chest 1 View    Result Date: 8/19/2023  STAT PORTABLE RADIOGRAPHIC VIEW OF THE CHEST  CLINICAL HISTORY: Acute stroke protocol.  FINDINGS:  The lungs are clear of acute infiltrates. The cardiomediastinal silhouette is within normal limits. The osseous structures are unremarkable.       No active disease in the chest.  This report was finalized on 8/19/2023 7:02 PM by Dr. Aniceto Deleon M.D.      CT Angiogram Head w AI Analysis of LVO    Result Date: 8/19/2023  CT ANGIOGRAM OF THE HEAD AND NECK AND CT PERFUSION STUDY  CLINICAL HISTORY: Difficulty standing up. Recent cerebellar infarct.  TECHNIQUE: A noncontrast head CT was performed with 3 mm axial images. Thereafter, a CT perfusion study was performed after the dynamic bolus of IV contrast. Standard perfusion maps were constructed with RAPID software. A CT angiogram of the head and neck was then performed with 1 mm axial images. Sagittal, coronal, and 3-dimensional reconstructed images were obtained. Finally, a delayed postcontrast head CT was performed with 3 mm axial images. AI analysis of LVO was utilized.  COMPARISON: CT angiogram of the head and neck dated 08/16/2023 and CT head dated 08/18/2023.  FINDINGS:  NONCONTRAST HEAD CT: On the previous MRI of the brain dated 08/16/2023, there were 2 small foci of acute infarction noted within the inferior aspect of the left cerebellar hemisphere within the left PICA distribution. These are not well delineated on this CT examination. There is no convincing new area of acute infarction on the head CT.  Incidental note is made of partial opacification of the left mastoid air cells which was also seen on the prior head CT dated 08/18/2023.  CT PERFUSION STUDY: There is no abnormality in the CBF less than 30% or the time to maximal enhancement greater than 6 second maps. However, there are some  areas of diminished perfusion within the left occipital lobe on the time to maximum enhancement greater than 4 second maps and measures up to 13 cc.  CTA NECK: There is a classic configuration of the aortic arch. There is no significant great vessel origin stenosis. A mild degree of stenosis is appreciated at the origin of the dominant right vertebral artery. Atherosclerotic changes are identified within the carotid bifurcations and proximal internal carotids. However, strictly speaking, there is no significant NASCET stenosis. Again, no significant interval change is seen in the CT angiogram of the neck when compared to the prior study dated 08/16/2023.  CTA HEAD: There is mild to moderate atherosclerotic irregularity and narrowing noted within the V4 segment of the right vertebral artery as well as the basilar artery. The basilar artery is unremarkable in appearance. The posterior cerebral arteries are unremarkable. Atherosclerotic calcifications are appreciated within the carotid siphons resulting in only mild degrees of luminal compromise. The middle and anterior cerebral arteries are unremarkable in appearance. When compared to the prior CT angiogram of the head, there is no significant interval change  DELAYED POSTCONTRAST HEAD CT: No abnormal foci of contrast enhancement are identified within the brain parenchyma.      There is no abnormality on the CBF less than 30% or the time to maximum enhancement greater than 6 seconds perfusion maps. However, there are some areas of diminished perfusion within the left occipital lobe on the time to maximum enhancement greater than 4 second maps measuring up to 13 cc that are within the left PCA distribution. The findings are compatible with an area of low level diminished perfusion within the left occipital lobe. No prior perfusion study was performed and a new small acute infarct within the left occipital lobe could not be excluded. Further evaluation could be  performed with an MRI of the brain for much more sensitive and specific area of acute infarction.  There is no significant interval change in the CT angiogram of the head and neck when compared to the prior study dated 08/16/2023. There is mild to moderate atherosclerotic irregularity and narrowing of the V4 segment of the right vertebral artery as well as the basilar artery. There is a moderate degree of stenosis within the origin of the right vertebral artery. Atherosclerotic changes are identified within the internal carotid arteries. However, there is no significant NASCET stenosis.  These findings were contemporaneously discussed with Dr. Kraus.  Radiation dose reduction techniques were utilized, including automated exposure control and exposure modulation based on body size.   This report was finalized on 8/19/2023 6:31 PM by Dr. Aniceto Deleon M.D.      CT CEREBRAL PERFUSION WITH & WITHOUT CONTRAST    Result Date: 8/19/2023  CT ANGIOGRAM OF THE HEAD AND NECK AND CT PERFUSION STUDY  CLINICAL HISTORY: Difficulty standing up. Recent cerebellar infarct.  TECHNIQUE: A noncontrast head CT was performed with 3 mm axial images. Thereafter, a CT perfusion study was performed after the dynamic bolus of IV contrast. Standard perfusion maps were constructed with RAPID software. A CT angiogram of the head and neck was then performed with 1 mm axial images. Sagittal, coronal, and 3-dimensional reconstructed images were obtained. Finally, a delayed postcontrast head CT was performed with 3 mm axial images. AI analysis of LVO was utilized.  COMPARISON: CT angiogram of the head and neck dated 08/16/2023 and CT head dated 08/18/2023.  FINDINGS:  NONCONTRAST HEAD CT: On the previous MRI of the brain dated 08/16/2023, there were 2 small foci of acute infarction noted within the inferior aspect of the left cerebellar hemisphere within the left PICA distribution. These are not well delineated on this CT examination. There is no  convincing new area of acute infarction on the head CT.  Incidental note is made of partial opacification of the left mastoid air cells which was also seen on the prior head CT dated 08/18/2023.  CT PERFUSION STUDY: There is no abnormality in the CBF less than 30% or the time to maximal enhancement greater than 6 second maps. However, there are some areas of diminished perfusion within the left occipital lobe on the time to maximum enhancement greater than 4 second maps and measures up to 13 cc.  CTA NECK: There is a classic configuration of the aortic arch. There is no significant great vessel origin stenosis. A mild degree of stenosis is appreciated at the origin of the dominant right vertebral artery. Atherosclerotic changes are identified within the carotid bifurcations and proximal internal carotids. However, strictly speaking, there is no significant NASCET stenosis. Again, no significant interval change is seen in the CT angiogram of the neck when compared to the prior study dated 08/16/2023.  CTA HEAD: There is mild to moderate atherosclerotic irregularity and narrowing noted within the V4 segment of the right vertebral artery as well as the basilar artery. The basilar artery is unremarkable in appearance. The posterior cerebral arteries are unremarkable. Atherosclerotic calcifications are appreciated within the carotid siphons resulting in only mild degrees of luminal compromise. The middle and anterior cerebral arteries are unremarkable in appearance. When compared to the prior CT angiogram of the head, there is no significant interval change  DELAYED POSTCONTRAST HEAD CT: No abnormal foci of contrast enhancement are identified within the brain parenchyma.      There is no abnormality on the CBF less than 30% or the time to maximum enhancement greater than 6 seconds perfusion maps. However, there are some areas of diminished perfusion within the left occipital lobe on the time to maximum enhancement  greater than 4 second maps measuring up to 13 cc that are within the left PCA distribution. The findings are compatible with an area of low level diminished perfusion within the left occipital lobe. No prior perfusion study was performed and a new small acute infarct within the left occipital lobe could not be excluded. Further evaluation could be performed with an MRI of the brain for much more sensitive and specific area of acute infarction.  There is no significant interval change in the CT angiogram of the head and neck when compared to the prior study dated 08/16/2023. There is mild to moderate atherosclerotic irregularity and narrowing of the V4 segment of the right vertebral artery as well as the basilar artery. There is a moderate degree of stenosis within the origin of the right vertebral artery. Atherosclerotic changes are identified within the internal carotid arteries. However, there is no significant NASCET stenosis.  These findings were contemporaneously discussed with Dr. Kraus.  Radiation dose reduction techniques were utilized, including automated exposure control and exposure modulation based on body size.   This report was finalized on 8/19/2023 6:31 PM by Dr. Aniceto Deleon M.D.       Impression:  58-year-old male with hypertension, hyperlipidemia, ADHD, BPH, Lepe's esophagus, and daily alcohol use with recent admission for left cerebellar stroke who presented a few hours after discharge with worsening gait.  On presentation BP in the 140s to 170s over 90s to 130s.  There were no other focal neurologic symptoms.  During previous admission 2D echo was unremarkable for source of stroke.  Following his recent stroke he was started on DAPT for 21 days as well as Lipitor 80 mg and was discharged with a Zio patch.  Repeat CTA head/neck this admission redemonstrated mild to moderate stenosis V4 segment of the of the, moderate stenosis at the origin of the right vertebral artery, dominant right  vertebral artery as well as basilar irregularity    Repeat MRI completed yesterday showed collection of numerous small foci of acute infarct in the left cerebellar hemisphere in the left PICA distribution, more infarcts appreciated but felt related to more sensitive MRI.  LDL was 119 prior to star ting statin, 62 with recent addition of Lipitor 80.  B12 500, TSH 0.288.  P2Y12 171.    Diagnosis:   Left cerebellar stroke, embolic versus hypertensive  Uncontrolled hypertension  Mixed hyperlipidemia  Daily alcohol use    Plan:  Discussed with Dr. Leon today, due to embolic appearance of stroke was cardiology to reevaluate for possible MEGHANN  He will need another Zio patch placed prior to discharge  Add meclizine 25 mg p.o. 3 times daily as needed vertigo/dizziness  Continue DAPT, Lipitor decreased to 40 mg daily, goal LDL less than 70  We will check for antiphospholipid antibodies  No more than 2 beers a day, recommend complete cessation  Neurochecks  BP control-needs better control, goal less than 140/90 long-term  Stroke Education  AARON/SCDs  PT/OT/ST  Will follow

## 2023-08-21 NOTE — PLAN OF CARE
"Goal Outcome Evaluation:                 SLP orders received and appreciated per CVA protocol.     Pt passed swallow screen, initiated on regular/thin liquid diet per orders. Discussed with pt, no complaints of dysphagia. Bedside swallow evaluation deferred at this time.     MRI + for multiple acute L cerebellar infarcts. Pt describes concerns with \"remembering my words.\" Overall, flat affect, decreased verbal output. Cognitive evaluation completed d/t independent prior level of function (lives alone in apartment, recent -lost job 3 weeks prior, independent for driving and all IADLS).     MMSE breakdown: 3/5 temporal orientation, 4/5 spatial orientation, 3/3 immediate recall, 4/5 serial 7's, 2/3 delayed recall, 2/2 naming, 1/1 repetition, 3/3 multi step directions, 1/1 reading, 0/1 sentences, 0/1 figural copy. Overall score, 23/30. With level of education, score may indicate mild cognitive impairment.     Pt amenable to plan of care implementation for cognition including functional memory, problem solving, and home management for return to all prior roles/activities. Will follow.      "

## 2023-08-21 NOTE — THERAPY TREATMENT NOTE
Patient Name: Flako Coreas  : 1965    MRN: [id number]                              Today's Date: 2023       Admit Date: 2023    Visit Dx:     ICD-10-CM ICD-9-CM   1. Ataxia  R27.0 781.3   2. History of cerebellar stroke  Z86.73 V12.54     Patient Active Problem List   Diagnosis    Mixed anxiety depressive disorder    Mixed hyperlipidemia    Essential hypertension    Diverticulosis    Nodule of spleen    Chronic bilateral lower abdominal pain    Lepe's esophagus without dysplasia    GERD (gastroesophageal reflux disease)    History of esophagitis    Dizziness    Acute CVA (cerebrovascular accident)    Hypertensive emergency    Ataxia     Past Medical History:   Diagnosis Date    ADHD (attention deficit hyperactivity disorder)     On going issue    Anxiety     Lepe's esophagus     Benign prostatic hyperplasia Surgery in 2021    Clotting disorder Intestinal    Depression     Diverticulitis     Diverticulosis     Duodenitis     Enteritis     Failure to thrive (child)     Family history of blood clots     GERD (gastroesophageal reflux disease)     Hyperlipidemia     Hypertension     Hypertensive emergency     Low testosterone     Microcytosis     Pancreatitis     Pneumonia     PONV (postoperative nausea and vomiting)     Seasonal affective disorder     Shoulder injury     Stroke     Unexplained weight loss      Past Surgical History:   Procedure Laterality Date    COLON SURGERY      COLONOSCOPY      COLONOSCOPY N/A 2022    Procedure: COLONOSCOPY INTO CECUM WITH HOT SNARE POLYPECTOMY;  Surgeon: Albert Gallo MD;  Location: [facility] ENDOSCOPY;  Service: Gastroenterology;  Laterality: N/A;  PRE- GI BLEED  POST- DIVERTICULOSIS, POLYP    ENDOSCOPY N/A 12/10/2022    Procedure: ESOPHAGOGASTRODUODENOSCOPY;  Surgeon: Albert Gallo MD;  Location: [facility] ENDOSCOPY;  Service: Gastroenterology;  Laterality: N/A;  PRE- DARK STOOLS  POST- BARRETTS ESOPHAGUS    FRACTURE SURGERY      for broken  arm    FRACTURE SURGERY      for broken hand    INCISION AND DRAINAGE ABSCESS  2015    anal    PROSTATE SURGERY      SMALL INTESTINE SURGERY      TONSILLECTOMY        General Information       Row Name 08/21/23 1559          Physical Therapy Time and Intention    Document Type therapy note (daily note)  -     Mode of Treatment individual therapy;physical therapy  -       Row Name 08/21/23 1550          General Information    Existing Precautions/Restrictions fall  -       Row Name 08/21/23 1559          Cognition    Orientation Status (Cognition) oriented x 4  -       Row Name 08/21/23 1555          Safety Issues, Functional Mobility    Impairments Affecting Function (Mobility) balance  -               User Key  (r) = Recorded By, (t) = Taken By, (c) = Cosigned By      Initials Name Provider Type     Marva Voss, PT Physical Therapist                   Mobility       Row Name 08/21/23 1600          Bed Mobility    Bed Mobility supine-sit;sit-supine  -     Supine-Sit Stokes (Bed Mobility) standby assist  -     Sit-Supine Stokes (Bed Mobility) standby assist  -     Assistive Device (Bed Mobility) bed rails;head of bed elevated  -     Comment, (Bed Mobility) pt performed supine<->sit x2 secondary to becoming dizzy and feeling better when lying down  -       Row Name 08/21/23 1600          Sit-Stand Transfer    Sit-Stand Stokes (Transfers) verbal cues;nonverbal cues (demo/gesture);standby assist;contact guard  -     Assistive Device (Sit-Stand Transfers) --  A  -     Comment, (Sit-Stand Transfer) performed sit to stand x2, became dizzy and nauseated having to lying back down each time  -       Row Name 08/21/23 1600          Gait/Stairs (Locomotion)    Stokes Level (Gait) verbal cues;nonverbal cues (demo/gesture);contact guard  -     Assistive Device (Gait) --  HHA  -     Distance in Feet (Gait) 3 side steps to [facility]  -     Deviations/Abnormal Patterns  (Gait) ataxic;base of support, narrow;julia decreased;stride length decreased  -     Bilateral Gait Deviations forward flexed posture  -               User Key  (r) = Recorded By, (t) = Taken By, (c) = Cosigned By      Initials Name Provider Type    Marva Davis, PT Physical Therapist                   Obj/Interventions    No documentation.                  Goals/Plan    No documentation.                  Clinical Impression       Row Name 08/21/23 1603          Pain    Pretreatment Pain Rating 0/10 - no pain  -     Posttreatment Pain Rating 0/10 - no pain  -       Row Name 08/21/23 1603          Plan of Care Review    Plan of Care Reviewed With patient  -     Outcome Evaluation Pt with increased dizziness when sitting and standing. Pt not able to tolerate upright positions despite multiple attempts. Pt was able to stand x2 and take a few side steps but had to return to supine on each attempts secondary to dizziness and nausea. PT will continue to follow to address strength, mobility, and gait.  -       Row Name 08/21/23 1603          Positioning and Restraints    Pre-Treatment Position in bed  -     Post Treatment Position bed  -     In Bed supine;call light within reach;encouraged to call for assist;exit alarm on;with family/caregiver  -               User Key  (r) = Recorded By, (t) = Taken By, (c) = Cosigned By      Initials Name Provider Type    Marva Davis, PT Physical Therapist                   Outcome Measures       Row Name 08/21/23 1607 08/21/23 0920       How much help from another person do you currently need...    Turning from your back to your side while in flat bed without using bedrails? 4  -CH 4  -MR    Moving from lying on back to sitting on the side of a flat bed without bedrails? 4  -CH 4  -MR    Moving to and from a bed to a chair (including a wheelchair)? 3  -CH 3  -MR    Standing up from a chair using your arms (e.g., wheelchair, bedside chair)? 3  -CH 4   -MR    Climbing 3-5 steps with a railing? 3  - 3  -MR    To walk in hospital room? 3  - 3  -MR    AM-PAC 6 Clicks Score (PT) 20  - 21  -MR    Highest level of mobility 6 --> Walked 10 steps or more  - 6 --> Walked 10 steps or more  -MR      Row Name 08/21/23 1607 08/21/23 1541       Functional Assessment    Outcome Measure Options AM-PAC 6 Clicks Basic Mobility (PT)  - AM-PAC 6 Clicks Daily Activity (OT)  -              User Key  (r) = Recorded By, (t) = Taken By, (c) = Cosigned By      Initials Name Provider Type    Angela Aguilera, OTR Occupational Therapist     Marva Voss, PT Physical Therapist     Brea Danna, RN Registered Nurse                                 Physical Therapy Education       Title: PT OT SLP Therapies (In Progress)       Topic: Physical Therapy (Done)       Point: Mobility training (Done)       Learning Progress Summary             Patient Acceptance, E,TB,D, DU,NR by  at 8/21/2023 1607    Acceptance, E,TB, VU,NR by  at 8/20/2023 1226                         Point: Home exercise program (Done)       Learning Progress Summary             Patient Acceptance, E,TB,D, DU,NR by  at 8/21/2023 1607                         Point: Body mechanics (Done)       Learning Progress Summary             Patient Acceptance, E,TB,D, DU,NR by  at 8/21/2023 1607    Acceptance, E,TB, VU,NR by  at 8/20/2023 1226                         Point: Precautions (Done)       Learning Progress Summary             Patient Acceptance, E,TB,D, DU,NR by  at 8/21/2023 1607    Acceptance, E,TB, VU,NR by  at 8/20/2023 1226                                         User Key       Initials Effective Dates Name Provider Type Discipline     06/16/21 -  Marva Voss, PT Physical Therapist PT     09/22/22 -  Brandi Jiang PT Physical Therapist PT                  PT Recommendation and Plan     Plan of Care Reviewed With: patient  Outcome Evaluation: Pt with increased dizziness  when sitting and standing. Pt not able to tolerate upright positions despite multiple attempts. Pt was able to stand x2 and take a few side steps but had to return to supine on each attempts secondary to dizziness and nausea. PT will continue to follow to address strength, mobility, and gait.     Time Calculation:         PT Charges       Row Name 08/21/23 1608             Time Calculation    Start Time 1404  -CH      Stop Time 1415  -CH      Time Calculation (min) 11 min  -CH      PT Received On 08/21/23  -      PT - Next Appointment 08/22/23  -         Time Calculation- PT    Total Timed Code Minutes- PT 11 minute(s)  -CH         Timed Charges    44172 - PT Therapeutic Activity Minutes 11  -CH         Total Minutes    Timed Charges Total Minutes 11  -CH       Total Minutes 11  -CH                User Key  (r) = Recorded By, (t) = Taken By, (c) = Cosigned By      Initials Name Provider Type    Marva Davis PT Physical Therapist                  Therapy Charges for Today       Code Description Service Date Service Provider Modifiers Qty    [id number]  PT THERAPEUTIC ACT EA 15 MIN 8/21/2023 Marva Voss PT GP 1            PT G-Codes  Outcome Measure Options: AM-PAC 6 Clicks Basic Mobility (PT)  AM-PAC 6 Clicks Score (PT): 20  AM-PAC 6 Clicks Score (OT): 19  Modified [place] Scale: 4 - Moderately severe disability.  Unable to walk without assistance, and unable to attend to own bodily needs without assistance.  PT Discharge Summary  Anticipated Discharge Disposition (PT): home with assist, inpatient rehabilitation facility (pending progress)    Marva Voss PT  8/21/2023

## 2023-08-21 NOTE — NURSING NOTE
Pt had c/o dizziness and anxiety today. Pt had repeat head Ct. Pt seen by PT/OT and cardiology. Plan for NPO at [place] tn and then MEGHANN tomorrow at 0730 with Dr Hidalgo. Pt in agreeance. Pt to sign consent with terri tomorrow morning per Cardiology RN. Pt had one VS with elevated BP. Will repeat BP following med pass. Pt will need zio patch at time of DC again this admission.

## 2023-08-21 NOTE — PLAN OF CARE
Goal Outcome Evaluation:  Plan of Care Reviewed With: patient           Outcome Evaluation: Pt seen by OT.  Pt reports dizziness both at supine and in standing but best while supine.  Pt agrees to walk to BR to complete grooming.  Felt best using walker due to dizziness.  Pt sits to groom at sink with set up to brush teeth and wash hands.  Pt returns to bed.  Will cont to follow for skilled OT.  Dizziness limits activity tolerance.

## 2023-08-21 NOTE — PROGRESS NOTES
Discharge Planning Assessment  [facility]     Patient Name: Flako Coreas  MRN: [id number]  Today's Date: 8/21/2023    Admit Date: 8/19/2023    Plan: Pending   Discharge Needs Assessment       Row Name 08/21/23 1047       Living Environment    People in Home alone    Current Living Arrangements home    Primary Care Provided by self    Provides Primary Care For no one    Family Caregiver if Needed other relative(s)    Family Caregiver Names Son Flako [phone]    Quality of Family Relationships supportive    Able to Return to Prior Arrangements yes       Transition Planning    Patient/Family Anticipates Transition to inpatient rehabilitation facility;home with help/services    Patient/Family Anticipated Services at Transition rehabilitation services;home health care       Discharge Needs Assessment    Equipment Currently Used at Home none    Concerns to be Addressed discharge planning    Outpatient/Agency/Support Group Needs homecare agency;inpatient rehabilitation facility    Discharge Facility/Level of Care Needs home with home health;acute rehab    Discharge Coordination/Progress Pending                   Discharge Plan       Row Name 08/21/23 1048       Plan    Plan Pending    Patient/Family in Agreement with Plan yes    Plan Comments CCP following to assist with d/c planning pending clinical course. Pt resides alone in an apartment with one flight exterior steps, uses no DME, and has no h/o HH/SNF. PT/OT recommend acute rehab, referral pending to [facility] acute (called to [facility]). CCP to follow. Sophia Fields LCSW                  Continued Care and Services - Admitted Since 8/19/2023    Coordination has not been started for this encounter.          Demographic Summary       Row Name 08/21/23 1046       General Information    Admission Type observation    Arrived From home    Required Notices Provided Observation Status Notice    Referral Source admission list    Reason for Consult discharge planning     Preferred Language English                   Functional Status       Row Name 08/21/23 1046       Functional Status    Usual Activity Tolerance good    Current Activity Tolerance moderate       Functional Status, IADL    Medications independent    Meal Preparation independent    Housekeeping independent    Laundry independent    Shopping independent       Mental Status Summary    Recent Changes in Mental Status/Cognitive Functioning no changes                   Psychosocial    No documentation.                  Abuse/Neglect    No documentation.                  Legal    No documentation.                  Substance Abuse    No documentation.                  Patient Forms    No documentation.                     Yamilet Fields LCSW

## 2023-08-21 NOTE — PLAN OF CARE
Goal Outcome Evaluation:  Plan of Care Reviewed With: patient           Outcome Evaluation: Pt with increased dizziness when sitting and standing. Pt not able to tolerate upright positions despite multiple attempts. Pt was able to stand x2 and take a few side steps but had to return to supine on each attempts secondary to dizziness and nausea. PT will continue to follow to address strength, mobility, and gait.      Anticipated Discharge Disposition (PT): home with assist, inpatient rehabilitation facility (pending progress)

## 2023-08-21 NOTE — PROGRESS NOTES
"DAILY PROGRESS NOTE  [facility]    Patient Identification:  Name: Flako Coreas  Age: 58 y.o.  Sex: male  :  1965  MRN: [id number]         Primary Care Physician: Akash Rodriguez Jr., DO      Subjective  Patient with no new complaints.  Major issue is still feeling dizzy.    Objective:  General Appearance:  Comfortable, well-appearing, in no acute distress and not in pain.    Vital signs: (most recent): Blood pressure 146/92, pulse 82, temperature 97.9 øF (36.6 øC), temperature source Oral, resp. rate 16, height 187.9 cm (73.98\"), weight 102 kg (225 lb), SpO2 98 %.    Lungs:  Normal effort and normal respiratory rate.  Breath sounds clear to auscultation.    Heart: Normal rate.  Regular rhythm.    Extremities: There is no dependent edema.    Neurological: Patient is alert and oriented to person, place and time.    Skin:  Warm and dry.                Vital signs in last 24 hours:  Temp:  [97.7 øF (36.5 øC)-98.2 øF (36.8 øC)] 97.9 øF (36.6 øC)  Heart Rate:  [64-82] 82  Resp:  [16-20] 16  BP: (134-161)/(80-99) 146/92    Intake/Output:    Intake/Output Summary (Last 24 hours) at 2023 1530  Last data filed at 2023 1048  Gross per 24 hour   Intake --   Output 975 ml   Net -975 ml         Results from last 7 days   Lab Units 23  0612 23  1628 23  0531 23  0434 23  1458   WBC 10*3/mm3 7.35 10.04 6.95 8.26 9.02   HEMOGLOBIN g/dL 14.4 16.4 14.3 14.5 15.2   PLATELETS 10*3/mm3 221 300 221 233 275     Results from last 7 days   Lab Units 23  0612 23  1654 23  1628 23  0531 23  0434 23  1458   SODIUM mmol/L 141  --  144 142 141 139   POTASSIUM mmol/L 4.0  --  4.1 3.8 3.9 3.8   CHLORIDE mmol/L 109*  --  105 106 107 104   CO2 mmol/L 22.1  --  25.0 22.5 21.7* 19.9*   BUN mg/dL 20  --  22* 16 13 15   CREATININE mg/dL 1.08 1.20 1.22 1.00 0.88 1.09   GLUCOSE mg/dL 94  --  142* 110* 96 124*   Estimated Creatinine Clearance: " 97.3 mL/min (by C-G formula based on SCr of 1.08 mg/dL).  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  Repeat MRI noted.  CVA: See above.  Continue dual antiplatelets.  Neurology input appreciated.  Await MEGHANN  Hypertension: Uncontrolled.  Improved.  Not at goal yet.  No stimulants on tox screen.  Add low-dose HCTZ.  Alcohol use:  Cannabinol use:      Plan:  Please see above.      Christian Romero MD  8/21/2023  15:30 EDT

## 2023-08-21 NOTE — PLAN OF CARE
Problem: Adult Inpatient Plan of Care  Goal: Plan of Care Review  Outcome: Ongoing, Progressing  Flowsheets (Taken 8/20/2023 1240 by Rafaela Bhatti, OT)  Outcome Evaluation: Pt is a 58 y.o male admitted to [facility] with dizziness and unsteady gait. He had only been home from the hospital a few hours after having a recent cerebellar stroke on 8/16. When pt left the hospital he reports he was getting up without difficulty but when he got home his symptoms had increased. He reports he had to sit down on the floor and was unable to walk up his flight of steps. He lives alone. Today pt with dizziness, decreased balance, and some safety awareness deficits largely limiting. His UEs are coordinated and has good strength. Pt is only able to take a few steps to the chair today CGA but then reports increased HA and dizziness and returned back to bed. Recommend continued OT to address deficits and progress independence with ADLs. He is not safe at this time to return back home alone.  Goal: Patient-Specific Goal (Individualized)  Outcome: Ongoing, Progressing  Goal: Absence of Hospital-Acquired Illness or Injury  Outcome: Ongoing, Progressing  Intervention: Identify and Manage Fall Risk  Recent Flowsheet Documentation  Taken 8/21/2023 0621 by Marla Grewal, RN  Safety Promotion/Fall Prevention:   safety round/check completed   activity supervised   assistive device/personal items within reach   clutter free environment maintained  Taken 8/21/2023 0400 by Marla Grewal, RN  Safety Promotion/Fall Prevention:   safety round/check completed   nonskid shoes/slippers when out of bed   fall prevention program maintained   clutter free environment maintained   assistive device/personal items within reach  Taken 8/21/2023 0200 by Marla Grewal, RN  Safety Promotion/Fall Prevention:   safety round/check completed   nonskid shoes/slippers when out of bed   assistive device/personal items within reach   clutter free environment  maintained  Taken 8/21/2023 0009 by Marla Grewal RN  Safety Promotion/Fall Prevention:   safety round/check completed   nonskid shoes/slippers when out of bed   lighting adjusted   assistive device/personal items within reach   clutter free environment maintained   activity supervised  Taken 8/20/2023 2200 by Marla Grewal RN  Safety Promotion/Fall Prevention:   safety round/check completed   nonskid shoes/slippers when out of bed   assistive device/personal items within reach  Taken 8/20/2023 2000 by Marla Grewal RN  Safety Promotion/Fall Prevention:   safety round/check completed   nonskid shoes/slippers when out of bed   clutter free environment maintained   lighting adjusted   assistive device/personal items within reach  Intervention: Prevent Skin Injury  Recent Flowsheet Documentation  Taken 8/21/2023 0400 by Marla Grewal RN  Body Position: position changed independently  Taken 8/21/2023 0009 by Marla Grewal RN  Body Position: position changed independently  Taken 8/20/2023 2000 by Marla Grewal RN  Body Position: position changed independently  Intervention: Prevent and Manage VTE (Venous Thromboembolism) Risk  Recent Flowsheet Documentation  Taken 8/20/2023 2000 by Marla Grewal RN  VTE Prevention/Management:   bilateral   sequential compression devices off   patient refused intervention  Intervention: Prevent Infection  Recent Flowsheet Documentation  Taken 8/21/2023 0621 by Marla Grewal RN  Infection Prevention: hand hygiene promoted  Taken 8/21/2023 0400 by Marla Grewal RN  Infection Prevention: hand hygiene promoted  Taken 8/20/2023 2200 by Marla Grewal RN  Infection Prevention: hand hygiene promoted  Taken 8/20/2023 2000 by Marla Grewal RN  Infection Prevention: hand hygiene promoted  Goal: Optimal Comfort and Wellbeing  Outcome: Ongoing, Progressing  Goal: Readiness for Transition of Care  Outcome: Ongoing, Progressing     Problem: Fall Injury  Risk  Goal: Absence of Fall and Fall-Related Injury  Outcome: Ongoing, Progressing  Intervention: Promote Injury-Free Environment  Recent Flowsheet Documentation  Taken 8/21/2023 0621 by Marla Grewal RN  Safety Promotion/Fall Prevention:   safety round/check completed   activity supervised   assistive device/personal items within reach   clutter free environment maintained  Taken 8/21/2023 0400 by Marla Grewal RN  Safety Promotion/Fall Prevention:   safety round/check completed   nonskid shoes/slippers when out of bed   fall prevention program maintained   clutter free environment maintained   assistive device/personal items within reach  Taken 8/21/2023 0200 by Marla Grewal RN  Safety Promotion/Fall Prevention:   safety round/check completed   nonskid shoes/slippers when out of bed   assistive device/personal items within reach   clutter free environment maintained  Taken 8/21/2023 0009 by Marla Grewal RN  Safety Promotion/Fall Prevention:   safety round/check completed   nonskid shoes/slippers when out of bed   lighting adjusted   assistive device/personal items within reach   clutter free environment maintained   activity supervised  Taken 8/20/2023 2200 by Marla Grewal RN  Safety Promotion/Fall Prevention:   safety round/check completed   nonskid shoes/slippers when out of bed   assistive device/personal items within reach  Taken 8/20/2023 2000 by Marla Grewal RN  Safety Promotion/Fall Prevention:   safety round/check completed   nonskid shoes/slippers when out of bed   clutter free environment maintained   lighting adjusted   assistive device/personal items within reach     Problem: Hypertension Comorbidity  Goal: Blood Pressure in Desired Range  Outcome: Ongoing, Progressing     Problem: Obstructive Sleep Apnea Risk or Actual Comorbidity Management  Goal: Unobstructed Breathing During Sleep  Outcome: Ongoing, Progressing     Problem: Pain Chronic (Persistent) (Comorbidity  Management)  Goal: Acceptable Pain Control and Functional Ability  Outcome: Ongoing, Progressing     Problem: Adjustment to Illness (Stroke, Ischemic/Transient Ischemic Attack)  Goal: Optimal Coping  Outcome: Ongoing, Progressing     Problem: Bowel Elimination Impaired (Stroke, Ischemic/Transient Ischemic Attack)  Goal: Effective Bowel Elimination  Outcome: Ongoing, Progressing     Problem: Cerebral Tissue Perfusion (Stroke, Ischemic/Transient Ischemic Attack)  Goal: Optimal Cerebral Tissue Perfusion  Outcome: Ongoing, Progressing     Problem: Cognitive Impairment (Stroke, Ischemic/Transient Ischemic Attack)  Goal: Optimal Cognitive Function  Outcome: Ongoing, Progressing     Problem: Pain Acute  Goal: Acceptable Pain Control and Functional Ability  Outcome: Ongoing, Progressing   Goal Outcome Evaluation:

## 2023-08-21 NOTE — PLAN OF CARE
Goal Outcome Evaluation:     Problem: Adult Inpatient Plan of Care  Goal: Plan of Care Review  Outcome: Ongoing, Progressing  Goal: Patient-Specific Goal (Individualized)  Outcome: Ongoing, Progressing  Goal: Absence of Hospital-Acquired Illness or Injury  Outcome: Ongoing, Progressing  Intervention: Identify and Manage Fall Risk  Recent Flowsheet Documentation  Taken 8/21/2023 0920 by Danna Guidry RN  Safety Promotion/Fall Prevention:   activity supervised   assistive device/personal items within reach   clutter free environment maintained   fall prevention program maintained   lighting adjusted   safety round/check completed  Intervention: Prevent Skin Injury  Recent Flowsheet Documentation  Taken 8/21/2023 0920 by Danna Guidry RN  Body Position: position changed independently  Intervention: Prevent and Manage VTE (Venous Thromboembolism) Risk  Recent Flowsheet Documentation  Taken 8/21/2023 0920 by Danna Guidry RN  Activity Management: ambulated to bathroom  VTE Prevention/Management:   bilateral   sequential compression devices off   patient refused intervention  Intervention: Prevent Infection  Recent Flowsheet Documentation  Taken 8/21/2023 0920 by Danna Guidry RN  Infection Prevention: hand hygiene promoted  Goal: Optimal Comfort and Wellbeing  Outcome: Ongoing, Progressing  Intervention: Provide Person-Centered Care  Recent Flowsheet Documentation  Taken 8/21/2023 0920 by Danna Guidry RN  Trust Relationship/Rapport:   care explained   choices provided  Goal: Readiness for Transition of Care  Outcome: Ongoing, Progressing     Problem: Fall Injury Risk  Goal: Absence of Fall and Fall-Related Injury  Outcome: Ongoing, Progressing  Intervention: Identify and Manage Contributors  Recent Flowsheet Documentation  Taken 8/21/2023 0920 by Danna Guidry RN  Medication Review/Management: medications reviewed  Intervention: Promote Injury-Free Environment  Recent Flowsheet  Documentation  Taken 8/21/2023 0920 by Danna Guidry RN  Safety Promotion/Fall Prevention:   activity supervised   assistive device/personal items within reach   clutter free environment maintained   fall prevention program maintained   lighting adjusted   safety round/check completed     Problem: Hypertension Comorbidity  Goal: Blood Pressure in Desired Range  Outcome: Ongoing, Progressing  Intervention: Maintain Blood Pressure Management  Recent Flowsheet Documentation  Taken 8/21/2023 0920 by Danna Guidry RN  Medication Review/Management: medications reviewed     Problem: Obstructive Sleep Apnea Risk or Actual Comorbidity Management  Goal: Unobstructed Breathing During Sleep  Outcome: Ongoing, Progressing     Problem: Pain Chronic (Persistent) (Comorbidity Management)  Goal: Acceptable Pain Control and Functional Ability  Outcome: Ongoing, Progressing  Intervention: Manage Persistent Pain  Recent Flowsheet Documentation  Taken 8/21/2023 0920 by Danna Guidry RN  Medication Review/Management: medications reviewed     Problem: Adjustment to Illness (Stroke, Ischemic/Transient Ischemic Attack)  Goal: Optimal Coping  Outcome: Ongoing, Progressing     Problem: Bowel Elimination Impaired (Stroke, Ischemic/Transient Ischemic Attack)  Goal: Effective Bowel Elimination  Outcome: Ongoing, Progressing     Problem: Cerebral Tissue Perfusion (Stroke, Ischemic/Transient Ischemic Attack)  Goal: Optimal Cerebral Tissue Perfusion  Outcome: Ongoing, Progressing     Problem: Cognitive Impairment (Stroke, Ischemic/Transient Ischemic Attack)  Goal: Optimal Cognitive Function  Outcome: Ongoing, Progressing     Problem: Pain Acute  Goal: Acceptable Pain Control and Functional Ability  Outcome: Ongoing, Progressing  Intervention: Prevent or Manage Pain  Recent Flowsheet Documentation  Taken 8/21/2023 0920 by Danna Guidry RN  Medication Review/Management: medications reviewed

## 2023-08-22 ENCOUNTER — APPOINTMENT (OUTPATIENT)
Dept: POSTOP/PACU | Facility: HOSPITAL | Age: 58
DRG: 057 | End: 2023-08-22
Payer: COMMERCIAL

## 2023-08-22 ENCOUNTER — ANESTHESIA EVENT (OUTPATIENT)
Dept: POSTOP/PACU | Facility: HOSPITAL | Age: 58
DRG: 057 | End: 2023-08-22
Payer: COMMERCIAL

## 2023-08-22 ENCOUNTER — ANESTHESIA (OUTPATIENT)
Dept: POSTOP/PACU | Facility: HOSPITAL | Age: 58
DRG: 057 | End: 2023-08-22
Payer: COMMERCIAL

## 2023-08-22 VITALS — DIASTOLIC BLOOD PRESSURE: 71 MMHG | OXYGEN SATURATION: 96 % | SYSTOLIC BLOOD PRESSURE: 110 MMHG | HEART RATE: 68 BPM

## 2023-08-22 PROBLEM — I63.9 CVA (CEREBRAL VASCULAR ACCIDENT): Status: ACTIVE | Noted: 2023-08-22

## 2023-08-22 PROCEDURE — 93325 DOPPLER ECHO COLOR FLOW MAPG: CPT | Performed by: INTERNAL MEDICINE

## 2023-08-22 PROCEDURE — 25010000002 ONDANSETRON PER 1 MG: Performed by: INTERNAL MEDICINE

## 2023-08-22 PROCEDURE — 99232 SBSQ HOSP IP/OBS MODERATE 35: CPT | Performed by: INTERNAL MEDICINE

## 2023-08-22 PROCEDURE — B245ZZ4 ULTRASONOGRAPHY OF LEFT HEART, TRANSESOPHAGEAL: ICD-10-PCS | Performed by: INTERNAL MEDICINE

## 2023-08-22 PROCEDURE — 93325 DOPPLER ECHO COLOR FLOW MAPG: CPT

## 2023-08-22 PROCEDURE — 97530 THERAPEUTIC ACTIVITIES: CPT

## 2023-08-22 PROCEDURE — 93312 ECHO TRANSESOPHAGEAL: CPT | Performed by: INTERNAL MEDICINE

## 2023-08-22 PROCEDURE — 25010000002 THIAMINE HCL 200 MG/2ML SOLUTION: Performed by: NURSE PRACTITIONER

## 2023-08-22 PROCEDURE — 93320 DOPPLER ECHO COMPLETE: CPT

## 2023-08-22 PROCEDURE — 93320 DOPPLER ECHO COMPLETE: CPT | Performed by: INTERNAL MEDICINE

## 2023-08-22 PROCEDURE — 99232 SBSQ HOSP IP/OBS MODERATE 35: CPT | Performed by: NURSE PRACTITIONER

## 2023-08-22 PROCEDURE — 93312 ECHO TRANSESOPHAGEAL: CPT

## 2023-08-22 PROCEDURE — 25010000002 PROPOFOL 10 MG/ML EMULSION: Performed by: NURSE ANESTHETIST, CERTIFIED REGISTERED

## 2023-08-22 RX ORDER — PROMETHAZINE HYDROCHLORIDE 25 MG/1
25 SUPPOSITORY RECTAL ONCE AS NEEDED
Status: DISCONTINUED | OUTPATIENT
Start: 2023-08-22 | End: 2023-08-24 | Stop reason: HOSPADM

## 2023-08-22 RX ORDER — MIDAZOLAM HYDROCHLORIDE 1 MG/ML
1 INJECTION INTRAMUSCULAR; INTRAVENOUS
Status: CANCELLED | OUTPATIENT
Start: 2023-08-22

## 2023-08-22 RX ORDER — NALOXONE HCL 0.4 MG/ML
0.2 VIAL (ML) INJECTION AS NEEDED
Status: DISCONTINUED | OUTPATIENT
Start: 2023-08-22 | End: 2023-08-24 | Stop reason: HOSPADM

## 2023-08-22 RX ORDER — LIDOCAINE HYDROCHLORIDE 10 MG/ML
0.5 INJECTION, SOLUTION INFILTRATION; PERINEURAL ONCE AS NEEDED
Status: CANCELLED | OUTPATIENT
Start: 2023-08-22

## 2023-08-22 RX ORDER — SODIUM CHLORIDE, SODIUM LACTATE, POTASSIUM CHLORIDE, CALCIUM CHLORIDE 600; 310; 30; 20 MG/100ML; MG/100ML; MG/100ML; MG/100ML
9 INJECTION, SOLUTION INTRAVENOUS CONTINUOUS
Status: CANCELLED | OUTPATIENT
Start: 2023-08-22

## 2023-08-22 RX ORDER — ONDANSETRON 2 MG/ML
4 INJECTION INTRAMUSCULAR; INTRAVENOUS ONCE AS NEEDED
Status: DISCONTINUED | OUTPATIENT
Start: 2023-08-22 | End: 2023-08-24 | Stop reason: HOSPADM

## 2023-08-22 RX ORDER — DROPERIDOL 2.5 MG/ML
0.62 INJECTION, SOLUTION INTRAMUSCULAR; INTRAVENOUS
Status: DISCONTINUED | OUTPATIENT
Start: 2023-08-22 | End: 2023-08-24 | Stop reason: HOSPADM

## 2023-08-22 RX ORDER — FAMOTIDINE 10 MG/ML
20 INJECTION, SOLUTION INTRAVENOUS ONCE
Status: CANCELLED | OUTPATIENT
Start: 2023-08-22 | End: 2023-08-22

## 2023-08-22 RX ORDER — SODIUM CHLORIDE 0.9 % (FLUSH) 0.9 %
3-10 SYRINGE (ML) INJECTION AS NEEDED
Status: CANCELLED | OUTPATIENT
Start: 2023-08-22

## 2023-08-22 RX ORDER — PROPOFOL 10 MG/ML
VIAL (ML) INTRAVENOUS AS NEEDED
Status: DISCONTINUED | OUTPATIENT
Start: 2023-08-22 | End: 2023-08-22 | Stop reason: SURG

## 2023-08-22 RX ORDER — DIPHENHYDRAMINE HYDROCHLORIDE 50 MG/ML
12.5 INJECTION INTRAMUSCULAR; INTRAVENOUS
Status: DISCONTINUED | OUTPATIENT
Start: 2023-08-22 | End: 2023-08-24 | Stop reason: HOSPADM

## 2023-08-22 RX ORDER — PROMETHAZINE HYDROCHLORIDE 12.5 MG/1
25 TABLET ORAL ONCE AS NEEDED
Status: DISCONTINUED | OUTPATIENT
Start: 2023-08-22 | End: 2023-08-24 | Stop reason: HOSPADM

## 2023-08-22 RX ORDER — SODIUM CHLORIDE 9 MG/ML
INJECTION, SOLUTION INTRAVENOUS CONTINUOUS PRN
Status: DISCONTINUED | OUTPATIENT
Start: 2023-08-22 | End: 2023-08-22 | Stop reason: SURG

## 2023-08-22 RX ORDER — FENTANYL CITRATE 50 UG/ML
50 INJECTION, SOLUTION INTRAMUSCULAR; INTRAVENOUS ONCE AS NEEDED
Status: CANCELLED | OUTPATIENT
Start: 2023-08-22

## 2023-08-22 RX ORDER — FLUMAZENIL 0.1 MG/ML
0.2 INJECTION INTRAVENOUS AS NEEDED
Status: DISCONTINUED | OUTPATIENT
Start: 2023-08-22 | End: 2023-08-24 | Stop reason: HOSPADM

## 2023-08-22 RX ORDER — EPHEDRINE SULFATE 50 MG/ML
INJECTION, SOLUTION INTRAVENOUS AS NEEDED
Status: DISCONTINUED | OUTPATIENT
Start: 2023-08-22 | End: 2023-08-22 | Stop reason: SURG

## 2023-08-22 RX ORDER — SODIUM CHLORIDE 0.9 % (FLUSH) 0.9 %
3 SYRINGE (ML) INJECTION EVERY 12 HOURS SCHEDULED
Status: CANCELLED | OUTPATIENT
Start: 2023-08-22

## 2023-08-22 RX ADMIN — LORAZEPAM 2 MG: 1 TABLET ORAL at 09:11

## 2023-08-22 RX ADMIN — Medication 10 ML: at 20:34

## 2023-08-22 RX ADMIN — PROPOFOL 30 MG: 10 INJECTION, EMULSION INTRAVENOUS at 07:28

## 2023-08-22 RX ADMIN — MULTIPLE VITAMINS W/ MINERALS TAB 1 TABLET: TAB at 08:21

## 2023-08-22 RX ADMIN — PANTOPRAZOLE SODIUM 40 MG: 40 TABLET, DELAYED RELEASE ORAL at 16:55

## 2023-08-22 RX ADMIN — PROPOFOL 50 MG: 10 INJECTION, EMULSION INTRAVENOUS at 07:20

## 2023-08-22 RX ADMIN — EPHEDRINE SULFATE 10 MG: 50 INJECTION INTRAVENOUS at 07:35

## 2023-08-22 RX ADMIN — LISINOPRIL 40 MG: 40 TABLET ORAL at 08:21

## 2023-08-22 RX ADMIN — SODIUM CHLORIDE 75 ML/HR: 9 INJECTION, SOLUTION INTRAVENOUS at 08:26

## 2023-08-22 RX ADMIN — THIAMINE HYDROCHLORIDE 200 MG: 100 INJECTION, SOLUTION INTRAMUSCULAR; INTRAVENOUS at 20:33

## 2023-08-22 RX ADMIN — HYDRALAZINE HYDROCHLORIDE 100 MG: 50 TABLET, FILM COATED ORAL at 13:30

## 2023-08-22 RX ADMIN — THIAMINE HYDROCHLORIDE 200 MG: 100 INJECTION, SOLUTION INTRAMUSCULAR; INTRAVENOUS at 13:30

## 2023-08-22 RX ADMIN — PROPOFOL 50 MG: 10 INJECTION, EMULSION INTRAVENOUS at 07:15

## 2023-08-22 RX ADMIN — CLOPIDOGREL BISULFATE 75 MG: 75 TABLET, FILM COATED ORAL at 08:20

## 2023-08-22 RX ADMIN — ASPIRIN 81 MG: 81 TABLET, CHEWABLE ORAL at 08:20

## 2023-08-22 RX ADMIN — PROPOFOL 100 MG: 10 INJECTION, EMULSION INTRAVENOUS at 07:17

## 2023-08-22 RX ADMIN — FOLIC ACID 1 MG: 1 TABLET ORAL at 08:21

## 2023-08-22 RX ADMIN — HYDROCHLOROTHIAZIDE 12.5 MG: 12.5 TABLET ORAL at 08:20

## 2023-08-22 RX ADMIN — PROPOFOL 30 MG: 10 INJECTION, EMULSION INTRAVENOUS at 07:25

## 2023-08-22 RX ADMIN — ONDANSETRON 4 MG: 2 INJECTION INTRAMUSCULAR; INTRAVENOUS at 16:59

## 2023-08-22 RX ADMIN — MECLIZINE HYDROCHLORIDE 25 MG: 25 TABLET ORAL at 16:55

## 2023-08-22 RX ADMIN — SODIUM CHLORIDE 75 ML/HR: 9 INJECTION, SOLUTION INTRAVENOUS at 00:49

## 2023-08-22 RX ADMIN — CARVEDILOL 6.25 MG: 6.25 TABLET, FILM COATED ORAL at 17:00

## 2023-08-22 RX ADMIN — MECLIZINE HYDROCHLORIDE 25 MG: 25 TABLET ORAL at 08:20

## 2023-08-22 RX ADMIN — SODIUM CHLORIDE: 9 INJECTION, SOLUTION INTRAVENOUS at 06:57

## 2023-08-22 RX ADMIN — ATORVASTATIN CALCIUM 40 MG: 20 TABLET, FILM COATED ORAL at 20:33

## 2023-08-22 RX ADMIN — THIAMINE HYDROCHLORIDE 200 MG: 100 INJECTION, SOLUTION INTRAMUSCULAR; INTRAVENOUS at 05:27

## 2023-08-22 RX ADMIN — HYDRALAZINE HYDROCHLORIDE 100 MG: 50 TABLET, FILM COATED ORAL at 20:33

## 2023-08-22 RX ADMIN — PROPOFOL 50 MG: 10 INJECTION, EMULSION INTRAVENOUS at 07:16

## 2023-08-22 RX ADMIN — SPIRONOLACTONE 25 MG: 25 TABLET ORAL at 08:20

## 2023-08-22 RX ADMIN — PROPOFOL 50 MG: 10 INJECTION, EMULSION INTRAVENOUS at 07:22

## 2023-08-22 RX ADMIN — CARVEDILOL 6.25 MG: 6.25 TABLET, FILM COATED ORAL at 08:20

## 2023-08-22 RX ADMIN — AMLODIPINE BESYLATE 5 MG: 5 TABLET ORAL at 08:21

## 2023-08-22 RX ADMIN — HYDROXYZINE HYDROCHLORIDE 25 MG: 25 TABLET ORAL at 16:55

## 2023-08-22 RX ADMIN — PANTOPRAZOLE SODIUM 40 MG: 40 TABLET, DELAYED RELEASE ORAL at 08:20

## 2023-08-22 RX ADMIN — VILAZODONE HYDROCHLORIDE 40 MG: 40 TABLET, FILM COATED ORAL at 08:21

## 2023-08-22 NOTE — PLAN OF CARE
Problem: Adult Inpatient Plan of Care  Goal: Plan of Care Review  Outcome: Ongoing, Progressing  Goal: Patient-Specific Goal (Individualized)  Outcome: Ongoing, Progressing  Goal: Absence of Hospital-Acquired Illness or Injury  Outcome: Ongoing, Progressing  Intervention: Identify and Manage Fall Risk  Recent Flowsheet Documentation  Taken 8/22/2023 0600 by Cate Shelton RN  Safety Promotion/Fall Prevention: safety round/check completed  Taken 8/22/2023 0400 by Cate Shelton RN  Safety Promotion/Fall Prevention: safety round/check completed  Taken 8/22/2023 0200 by Cate Shelton RN  Safety Promotion/Fall Prevention: safety round/check completed  Taken 8/22/2023 0000 by Cate Shelton RN  Safety Promotion/Fall Prevention: safety round/check completed  Taken 8/21/2023 2200 by Cate Shelton RN  Safety Promotion/Fall Prevention: safety round/check completed  Taken 8/21/2023 2030 by Cate Shelton RN  Safety Promotion/Fall Prevention: safety round/check completed  Goal: Optimal Comfort and Wellbeing  Outcome: Ongoing, Progressing  Goal: Readiness for Transition of Care  Outcome: Ongoing, Progressing     Problem: Fall Injury Risk  Goal: Absence of Fall and Fall-Related Injury  Outcome: Ongoing, Progressing  Intervention: Identify and Manage Contributors  Recent Flowsheet Documentation  Taken 8/21/2023 2030 by Cate Shelton RN  Medication Review/Management: medications reviewed  Intervention: Promote Injury-Free Environment  Recent Flowsheet Documentation  Taken 8/22/2023 0600 by Cate Shelton RN  Safety Promotion/Fall Prevention: safety round/check completed  Taken 8/22/2023 0400 by Cate Shelton RN  Safety Promotion/Fall Prevention: safety round/check completed  Taken 8/22/2023 0200 by Cate Shelton RN  Safety Promotion/Fall Prevention: safety round/check completed  Taken 8/22/2023 0000 by Cate Shelton RN  Safety Promotion/Fall Prevention: safety round/check completed  Taken 8/21/2023 2200 by Cate Shelton RN  Safety Promotion/Fall  Prevention: safety round/check completed  Taken 8/21/2023 2030 by Cate Shelton RN  Safety Promotion/Fall Prevention: safety round/check completed     Problem: Hypertension Comorbidity  Goal: Blood Pressure in Desired Range  Outcome: Ongoing, Progressing  Intervention: Maintain Blood Pressure Management  Recent Flowsheet Documentation  Taken 8/21/2023 2030 by Cate Shelton RN  Medication Review/Management: medications reviewed     Problem: Obstructive Sleep Apnea Risk or Actual Comorbidity Management  Goal: Unobstructed Breathing During Sleep  Outcome: Ongoing, Progressing     Problem: Pain Chronic (Persistent) (Comorbidity Management)  Goal: Acceptable Pain Control and Functional Ability  Outcome: Ongoing, Progressing  Intervention: Manage Persistent Pain  Recent Flowsheet Documentation  Taken 8/21/2023 2030 by Cate Shelton RN  Medication Review/Management: medications reviewed     Problem: Adjustment to Illness (Stroke, Ischemic/Transient Ischemic Attack)  Goal: Optimal Coping  Outcome: Ongoing, Progressing     Problem: Bowel Elimination Impaired (Stroke, Ischemic/Transient Ischemic Attack)  Goal: Effective Bowel Elimination  Outcome: Ongoing, Progressing     Problem: Cerebral Tissue Perfusion (Stroke, Ischemic/Transient Ischemic Attack)  Goal: Optimal Cerebral Tissue Perfusion  Outcome: Ongoing, Progressing     Problem: Cognitive Impairment (Stroke, Ischemic/Transient Ischemic Attack)  Goal: Optimal Cognitive Function  Outcome: Ongoing, Progressing     Problem: Pain Acute  Goal: Acceptable Pain Control and Functional Ability  Outcome: Ongoing, Progressing  Intervention: Prevent or Manage Pain  Recent Flowsheet Documentation  Taken 8/21/2023 2030 by Cate Shelton RN  Medication Review/Management: medications reviewed   Goal Outcome Evaluation:

## 2023-08-22 NOTE — PROGRESS NOTES
LOS: 0 days   Patient Care Team:  Akash Rodriguez Jr., DO as PCP - General (Sports Medicine)    Chief Complaint: Follow-up hypertensive emergency, acute CVA.    Interval History: Blood pressure still elevated.  No acute complaints this morning.  No chest pain.  MEGHANN was performed and was unremarkable.    Vital Signs:  Temp:  [97.9 øF (36.6 øC)-98.6 øF (37 øC)] 97.9 øF (36.6 øC)  Heart Rate:  [58-75] 58  Resp:  [16-22] 18  BP: ()/() 153/93    Intake/Output Summary (Last 24 hours) at 8/22/2023 1522  Last data filed at 8/22/2023 1255  Gross per 24 hour   Intake 1950 ml   Output 1250 ml   Net 700 ml       Physical Exam:   General Appearance:    No acute distress, alert and oriented x4   Lungs:     Clear to auscultation bilaterally     Heart:    Regular rhythm and normal rate.  No murmurs, gallops, or    rubs.   Abdomen:     Soft, nontender, nondistended.    Extremities:   No clubbing, cyanosis, or edema.     Results Review:    Results from last 7 days   Lab Units 08/20/23  0612   SODIUM mmol/L 141   POTASSIUM mmol/L 4.0   CHLORIDE mmol/L 109*   CO2 mmol/L 22.1   BUN mg/dL 20   CREATININE mg/dL 1.08   GLUCOSE mg/dL 94   CALCIUM mg/dL 8.8     Results from last 7 days   Lab Units 08/19/23  1628 08/16/23  1458   HSTROP T ng/L 8 6     Results from last 7 days   Lab Units 08/20/23  0612   WBC 10*3/mm3 7.35   HEMOGLOBIN g/dL 14.4   HEMATOCRIT % 43.8   PLATELETS 10*3/mm3 221     Results from last 7 days   Lab Units 08/19/23  1628   INR  0.90   APTT seconds 30.8     Results from last 7 days   Lab Units 08/20/23  0612   CHOLESTEROL mg/dL 117     Results from last 7 days   Lab Units 08/16/23  1458   MAGNESIUM mg/dL 2.4     Results from last 7 days   Lab Units 08/20/23  0612   CHOLESTEROL mg/dL 117   TRIGLYCERIDES mg/dL 115   HDL CHOL mg/dL 34*   LDL CHOL mg/dL 62       I reviewed the patient's new clinical results.        Assessment:  1.  Hypertensive emergency  2.  Acute CVA left cerebellum  3.   Hyperlipidemia  4.  GERD with a history of Lepe's esophagus  5.  Anxiety    Plan:  -MEGHANN this morning was unremarkable and did not show any clear source of embolism.  There was no left atrial appendage thrombus or PFO.  His aortic arch was clean.    -Increase spironolactone to 50 mg/day.  Continue hydralazine 100 mg every 8 hours, HCTZ 12.5 mg/day, amlodipine 5 mg/day, and lisinopril 40 mg/day.    -Renal Doppler ultrasound on 12/9/2022 was normal without evidence of renal artery stenosis.    -Continue aspirin, Plavix, and Lipitor 80 mg qhs.      Carlos Foster MD  08/22/23  15:22 EDT

## 2023-08-22 NOTE — PROGRESS NOTES
"DOS: 2023  NAME: Flako Coreas   : 1965  PCP: Akash Rodriguez Jr.,   Chief Complaint   Patient presents with    Dizziness     Stroke    Subjective: S/p MEGHANN this morning. He's still feeling dizzy and anxious. Meclizine wasn't helpful. No new or worsening symptoms overnight.     Objective:  Vital signs: /89 (BP Location: Right arm, Patient Position: Lying)   Pulse 66   Temp 98.3 øF (36.8 øC) (Oral)   Resp 18   Ht 187.9 cm (73.98\")   Wt 102 kg (225 lb)   SpO2 95%   BMI 28.91 kg/mý       General appearance: Well developed, well nourished, well groomed, alert and cooperative.   HEENT: Normocephalic.   Neck: Supple  Cardiac: Regular rate and rhythm. No murmurs.   Peripheral Vasculature: Radial pulses are equal and symmetric.  Chest Exam: Clear to auscultation bilaterally, no wheezes, no rhonchi.  Extremities: Normal, no edema.   Skin: No rashes or birthmarks.      Higher integrative function: Oriented to time, place, person, intact recent and remote memory, attention span, concentration and language. Spontaneous speech, fund of vocabulary are normal.   CN II: Normal visual fields.   CN III IV VI: Extraocular movements are full without nystagmus. Pupils are equal, round, and reactive to light.   CN V: Normal facial sensation.  CN VII: Facial movements are symmetric, no weakness.   CN VIII: Auditory acuity is normal.   CN IX & X: Symmetric palatal movement.   CN XI: Sternocleidomastoid and trapezius are normal. No weakness.   CN XII: The tongue is midline. No atrophy or fasciculations.   Motor: Normal muscle strength, bulk, and tone in upper and lower extremities. No fasciculations, rigidity, spasticity or abnormal movements.   Sensation: Normal light touch in all extremities.   Station and gait: Deferred.  Coordination: Finger to nose test showed no dysmetria. Rapid alternating movements were normal. Heel to shin normal.   The patient was reexamined, changes noted.     Scheduled " Meds:amLODIPine, 5 mg, Oral, Daily  aspirin, 81 mg, Oral, Daily  atorvastatin, 40 mg, Oral, Nightly  carvedilol, 6.25 mg, Oral, BID With Meals  clopidogrel, 75 mg, Oral, Daily  folic acid, 1 mg, Oral, Daily  hydrALAZINE, 100 mg, Oral, Q8H  hydroCHLOROthiazide Oral, 12.5 mg, Oral, Daily  lisinopril, 40 mg, Oral, Daily  multivitamin with minerals, 1 tablet, Oral, Daily  pantoprazole, 40 mg, Oral, BID AC  spironolactone, 25 mg, Oral, Daily  thiamine (B-1) IV, 200 mg, Intravenous, Q8H   Followed by  [START ON 8/25/2023] thiamine, 100 mg, Oral, Daily  vilazodone, 40 mg, Oral, Daily      Continuous Infusions:sodium chloride, 75 mL/hr, Last Rate: 75 mL/hr (08/22/23 1024)      PRN Meds:.  acetaminophen **OR** acetaminophen    bisacodyl    diphenhydrAMINE    droperidol **OR** droperidol    flumazenil    hydrOXYzine    LORazepam **OR** LORazepam **OR** LORazepam **OR** LORazepam **OR** LORazepam **OR** LORazepam    Magnesium Standard Dose Replacement - Follow Nurse / BPA Driven Protocol    meclizine    naloxone    ondansetron    ondansetron    promethazine **OR** promethazine    sodium chloride      Laboratory results:  Lab Results   Component Value Date    GLUCOSE 94 08/20/2023    CALCIUM 8.8 08/20/2023     08/20/2023    K 4.0 08/20/2023    CO2 22.1 08/20/2023     (H) 08/20/2023    BUN 20 08/20/2023    CREATININE 1.08 08/20/2023    EGFRIFAFRI >60 09/28/2018    EGFRIFNONA 98 11/05/2018    BCR 18.5 08/20/2023    ANIONGAP 9.9 08/20/2023     Lab Results   Component Value Date    WBC 7.35 08/20/2023    HGB 14.4 08/20/2023    HCT 43.8 08/20/2023    MCV 80.8 08/20/2023     08/20/2023     Lab Results   Component Value Date    CHOL 117 08/20/2023    CHOL 194 08/16/2023     Lab Results   Component Value Date    HDL 34 (L) 08/20/2023    HDL 41 08/16/2023    HDL 46 05/11/2022     Lab Results   Component Value Date    LDL 62 08/20/2023     (H) 08/16/2023     (H) 05/11/2022     Lab Results   Component  Value Date    TRIG 115 08/20/2023    TRIG 194 (H) 08/16/2023    TRIG 149 05/11/2022         Lab 08/16/23  1458   HEMOGLOBIN A1C 5.20      Review and interpretation of imaging:  CT Head Without Contrast    Result Date: 8/21/2023   1. Unfortunately, the 9 tiny 3 to 6 mm acute infarcts in the mid to inferior medial left cerebellum within the left PICA territory seen on yesterday's MRI of the brain 08/20/2023,  are too small to appreciate on the current head CT, but they were clearly present on yesterday's MRI of the brain. Other than minimal small vessel disease in the cerebral white matter, th remainder of the head CT is within normal limits.  Radiation dose reduction techniques were utilized, including automated exposure control and exposure modulation based on body size.   This report was finalized on 8/21/2023 9:54 AM by Dr. Miguel Turner M.D.      MRI Brain Without Contrast    Result Date: 8/20/2023   There is a small collection of numerous small foci of acute infarction within the inferior and medial aspect of the left cerebellar hemisphere within the left PICA distribution. A larger number of infarcts are appreciated on the current exam although it is difficult to assess whether these are truly new foci of acute infarction as the current study was performed on a more sensitive 3 Rosa MRI unit as compared to the previously implemented 1.5 Rosa MRI machine. As such, the diffusion-weighted imaging sequence is more sensitive on the current examination.  These findings were discussed with Dr. Kraus on 08/20/2023 at approximately 3:54 PM.   This report was finalized on 8/20/2023 4:18 PM by Dr. Aniceto Deleon M.D.       Impression:  58-year-old male with hypertension, hyperlipidemia, ADHD, BPH, Lepe's esophagus, and alcohol use with recent admission for left cerebellar stroke who presented a few hours after discharge with worsening gait.  On presentation BP in the 140s to 170s over 90s to 130s.  There were no other  focal neurologic symptoms.  During previous admission 2D echo was unremarkable for source of stroke.  Following his recent stroke he was started on DAPT for 21 days as well as Lipitor 80 mg and was discharged with a Zio patch.  Repeat CTA head/neck this admission redemonstrated mild to moderate stenosis V4 segment of the of the, moderate stenosis at the origin of the right vertebral artery, dominant right vertebral artery as well as basilar irregularity     Repeat MRI 8/20 completed showed collection of numerous small foci of acute infarct in the left cerebellar hemisphere in the left PICA distribution, more infarcts appreciated but felt related to more sensitive MRI.    LDL was 119 prior to starting statin, 62 with recent addition of Lipitor 80.  B12 500, TSH 0.288.  P2Y12 171.    Repeat CT head was completed yesterday due to complaints of dizziness and anxiety, findings stable.    Diagnosis:   Left cerebellar stroke, embolic versus hypertensive  Uncontrolled hypertension  Mixed hyperlipidemia  Regular alcohol use  At risk for KRIS  Anxiety   The above impression statement reviewed and changes noted.    Plan:  Will follow MEGHANN report peripherally.  Continue DAPT for total of 21 d then aspirin monotherapy   Lipitor 40 mg daily at discharge  Recommend alcohol moderation/cessation  Recommend outpatient sleep study to evaluate for sleep apnea due to risk factors, patient agreeable, will place referral  F/U APL screen peripherally  Zio patch at discharge, will order  Discussed with patient adding Lexapro for anxiety however he is already on Viibryd.  PT/OT following.  Discussed with Dr. Leon today. No further w/u needed. Will arrange 3mo outpatient f/u. Will sign off but please call with further questions/concerns.

## 2023-08-22 NOTE — PLAN OF CARE
Goal Outcome Evaluation:        Problem: Adult Inpatient Plan of Care  Goal: Plan of Care Review  Outcome: Ongoing, Progressing  Goal: Patient-Specific Goal (Individualized)  Outcome: Ongoing, Progressing  Goal: Absence of Hospital-Acquired Illness or Injury  Outcome: Ongoing, Progressing  Intervention: Identify and Manage Fall Risk  Recent Flowsheet Documentation  Taken 8/22/2023 0820 by Danna Guidry RN  Safety Promotion/Fall Prevention:   activity supervised   assistive device/personal items within reach   fall prevention program maintained   lighting adjusted   nonskid shoes/slippers when out of bed  Intervention: Prevent Skin Injury  Recent Flowsheet Documentation  Taken 8/22/2023 0820 by Danna Guidry RN  Body Position: position changed independently  Intervention: Prevent and Manage VTE (Venous Thromboembolism) Risk  Recent Flowsheet Documentation  Taken 8/22/2023 0820 by Danna Guidry RN  VTE Prevention/Management:   bilateral   sequential compression devices off   patient refused intervention  Goal: Optimal Comfort and Wellbeing  Outcome: Ongoing, Progressing  Intervention: Provide Person-Centered Care  Recent Flowsheet Documentation  Taken 8/22/2023 0820 by Danna Guidry RN  Trust Relationship/Rapport:   care explained   choices provided  Goal: Readiness for Transition of Care  Outcome: Ongoing, Progressing     Problem: Fall Injury Risk  Goal: Absence of Fall and Fall-Related Injury  Outcome: Ongoing, Progressing  Intervention: Identify and Manage Contributors  Recent Flowsheet Documentation  Taken 8/22/2023 0820 by Danna Guidry RN  Medication Review/Management: medications reviewed  Intervention: Promote Injury-Free Environment  Recent Flowsheet Documentation  Taken 8/22/2023 0820 by Danna Guidry RN  Safety Promotion/Fall Prevention:   activity supervised   assistive device/personal items within reach   fall prevention program maintained   lighting adjusted   nonskid  shoes/slippers when out of bed     Problem: Hypertension Comorbidity  Goal: Blood Pressure in Desired Range  Outcome: Ongoing, Progressing  Intervention: Maintain Blood Pressure Management  Recent Flowsheet Documentation  Taken 8/22/2023 0820 by Danna Guidry RN  Medication Review/Management: medications reviewed     Problem: Obstructive Sleep Apnea Risk or Actual Comorbidity Management  Goal: Unobstructed Breathing During Sleep  Outcome: Ongoing, Progressing     Problem: Pain Chronic (Persistent) (Comorbidity Management)  Goal: Acceptable Pain Control and Functional Ability  Outcome: Ongoing, Progressing  Intervention: Manage Persistent Pain  Recent Flowsheet Documentation  Taken 8/22/2023 0820 by Danna Guidry RN  Medication Review/Management: medications reviewed     Problem: Adjustment to Illness (Stroke, Ischemic/Transient Ischemic Attack)  Goal: Optimal Coping  Outcome: Ongoing, Progressing     Problem: Bowel Elimination Impaired (Stroke, Ischemic/Transient Ischemic Attack)  Goal: Effective Bowel Elimination  Outcome: Ongoing, Progressing     Problem: Cerebral Tissue Perfusion (Stroke, Ischemic/Transient Ischemic Attack)  Goal: Optimal Cerebral Tissue Perfusion  Outcome: Ongoing, Progressing  Intervention: Protect and Optimize Cerebral Perfusion  Recent Flowsheet Documentation  Taken 8/22/2023 0820 by Danna Guidry RN  Cerebral Perfusion Promotion: blood pressure monitored     Problem: Cognitive Impairment (Stroke, Ischemic/Transient Ischemic Attack)  Goal: Optimal Cognitive Function  Outcome: Ongoing, Progressing     Problem: Pain Acute  Goal: Acceptable Pain Control and Functional Ability  Outcome: Ongoing, Progressing  Intervention: Prevent or Manage Pain  Recent Flowsheet Documentation  Taken 8/22/2023 0820 by Danna Guidry RN  Medication Review/Management: medications reviewed

## 2023-08-22 NOTE — ANESTHESIA PREPROCEDURE EVALUATION
Anesthesia Evaluation     NPO Solid Status: > 8 hours  NPO Liquid Status: > 8 hours           Airway   Mallampati: II  No difficulty expected  Dental      Pulmonary    Cardiovascular   Exercise tolerance: good (4-7 METS)    (+) hypertensionhyperlipidemia      Neuro/Psych  (+) CVA, dizziness/light headedness, psychiatric history  GI/Hepatic/Renal/Endo    (+) GERD    Musculoskeletal     Abdominal    Substance History      OB/GYN          Other                      Anesthesia Plan    ASA 3     MAC     intravenous induction     Anesthetic plan, risks, benefits, and alternatives have been provided, discussed and informed consent has been obtained with: patient.    CODE STATUS:    Code Status (Patient has no pulse and is not breathing): CPR (Attempt to Resuscitate)  Medical Interventions (Patient has pulse or is breathing): Full

## 2023-08-22 NOTE — PAYOR COMM NOTE
"Nimesh Coreas (58 y.o. Male)     PLEASE SEE ATTACHED FOR INPT AUTH     PT WAS OBSERVATION 8/19 - 8/21  INPT ON 8/22    REF #  [id number]    PLEASE CALL JOSE DE LA TORRE RN/ DEPT @ [phone]  OR FAX  DEPARTMENT @  [phone]    THANK YOU   JOSE DE LA TORRE RN  [facility]          Date of Birth   1965    Social Security Number       Address   [address] [place] [address]    Home Phone   [phone]    MRN   [id number]       Jain   Orthodox    Marital Status   Single                            Admission Date   8/19/23    Admission Type   Emergency    Admitting Provider   Xena Cooper MD    Attending Provider   Christian Romero MD    Department, Room/Bed   [facility] [address] [place], P596/1       Discharge Date       Discharge Disposition       Discharge Destination                                 Attending Provider: Christian Romero MD    Allergies: No Known Allergies    Isolation: None   Infection: None   Code Status: CPR    Ht: 187.9 cm (73.98\")   Wt: 102 kg (225 lb)    Admission Cmt: None   Principal Problem: Ataxia [R27.0]                   Active Insurance as of 8/19/2023       Primary Coverage       Payor Plan Insurance Group Employer/Plan Group    [employer]HE[employer] BLUE CROSS ANTHEM BLUE CROSS BLUE SHIELD PPO [id number]       Payor Plan Address Payor Plan Phone Number Payor Plan Fax Number Effective Dates    [address] [phone]  1/1/2023 - None Entered    [facility] [address]         Subscriber Name Subscriber Birth Date Member ID       NIMESH COREAS KAVEH 1965 [id number]               Secondary Coverage       Payor Plan Insurance Group Employer/Plan Group    [facility] COMMUNITY PLAN OF [facility] COMMUNITY PLAN [facility]       Payor Plan Address Payor Plan Phone Number Payor Plan Fax Number Effective Dates    [address]   1/1/2021 - None Entered    [facility] [address]         Subscriber Name Subscriber Birth Date Member ID       " FLAKO COREAS 1965 [id number]                     Emergency Contacts        (Rel.) Home Phone Work Phone Mobile Phone    Flako Coreas (Son) [phone] -- [phone]              [place] [id number]  Tax ID [id number]     History & Physical        Stingl, Xena Garcia MD at 23 0500          HISTORY AND PHYSICAL   [facility]        Date of Admission: 2023  Patient Identification:  Name: Flako Coreas  Age: 58 y.o.  Sex: male  :  1965  MRN: [id number]                     Primary Care Physician: Akash Rodriguez Jr., DO    Chief Complaint:  58 year old gentleman who was discharged earlier today after he was diagnosed with a stroke; he also had uncontrolled hypertension; after arriving at home the patient continued to have dizziness, difficulty walking and falls; he has had blurred vision but no double vision; no fever or chills    History of Present Illness:   As above    Past Medical History:  Past Medical History:   Diagnosis Date    ADHD (attention deficit hyperactivity disorder)     On going issue    Anxiety     Lepe's esophagus     Benign prostatic hyperplasia Surgery in 2021    Clotting disorder Intestinal    Depression     Diverticulitis     Diverticulosis     Duodenitis     Enteritis     Failure to thrive (child)     Family history of blood clots     GERD (gastroesophageal reflux disease)     Hyperlipidemia     Hypertension     Hypertensive emergency     Low testosterone     Microcytosis     Pancreatitis     Pneumonia     PONV (postoperative nausea and vomiting)     Seasonal affective disorder     Shoulder injury     Stroke     Unexplained weight loss      Past Surgical History:  Past Surgical History:   Procedure Laterality Date    COLON SURGERY      COLONOSCOPY      COLONOSCOPY N/A 2022    Procedure: COLONOSCOPY INTO CECUM WITH HOT SNARE POLYPECTOMY;  Surgeon: Albert Gallo MD;  Location: [facility] ENDOSCOPY;  Service:  Gastroenterology;  Laterality: N/A;  PRE- GI BLEED  POST- DIVERTICULOSIS, POLYP    ENDOSCOPY N/A 12/10/2022    Procedure: ESOPHAGOGASTRODUODENOSCOPY;  Surgeon: Albert Gallo MD;  Location: [facility] ENDOSCOPY;  Service: Gastroenterology;  Laterality: N/A;  PRE- DARK STOOLS  POST- BARRETTS ESOPHAGUS    FRACTURE SURGERY  1980    for broken arm    FRACTURE SURGERY      for broken hand    INCISION AND DRAINAGE ABSCESS  2015    anal    PROSTATE SURGERY      SMALL INTESTINE SURGERY      TONSILLECTOMY        Home Meds:  Medications Prior to Admission   Medication Sig Dispense Refill Last Dose    aspirin 81 MG EC tablet Take 1 tablet by mouth Daily. 90 tablet 0 8/19/2023 at 1400    atorvastatin (LIPITOR) 80 MG tablet Take 1 tablet by mouth Every Night. 90 tablet 0 8/19/2023    carvedilol (COREG) 6.25 MG tablet Take 1 tablet by mouth 2 (Two) Times a Day With Meals. 60 tablet 0 8/19/2023    clopidogrel (PLAVIX) 75 MG tablet Take 1 tablet by mouth Daily for 21 days. 21 tablet 0 8/19/2023    hydrALAZINE (APRESOLINE) 100 MG tablet Take 1 tablet by mouth Every 8 (Eight) Hours. 90 tablet 0 8/19/2023 at 1400    hydrOXYzine (ATARAX) 25 MG tablet Take 1 tablet by mouth 3 (Three) Times a Day As Needed for Anxiety. 60 tablet 0 8/19/2023    lisinopril (PRINIVIL,ZESTRIL) 40 MG tablet Take 1 tablet by mouth Daily. 30 tablet 0 8/19/2023    ondansetron (ZOFRAN) 8 MG tablet Take 1 tablet by mouth Every 8 (Eight) Hours As Needed for Vomiting or Nausea. 30 tablet 0 8/19/2023    spironolactone (ALDACTONE) 25 MG tablet Take 1 tablet by mouth Daily. 30 tablet 0 8/19/2023    vilazodone (Viibryd) 40 MG tablet tablet Take 1 tablet by mouth Daily. 90 tablet 1 8/19/2023    amitriptyline (ELAVIL) 25 MG tablet Take 1 tablet by mouth At Night As Needed for Sleep.   Unknown    amLODIPine (NORVASC) 5 MG tablet Take 1 tablet by mouth Daily. 90 tablet 0 Unknown    pantoprazole (PROTONIX) 40 MG EC tablet Take 1 tablet by mouth 2 (Two) Times a Day Before  Meals. 60 tablet 0 Unknown    sucralfate (CARAFATE) 1 g tablet Take 1 tablet by mouth 4 (Four) Times a Day. 20 tablet 0 Unknown       Allergies:  No Known Allergies  Immunizations:  Immunization History   Administered Date(s) Administered    COVID-19 (PFIZER) Purple Cap Monovalent 03/09/2021, 12/29/2021    Covid-19 (Pfizer) Gray Cap Monovalent 05/20/2022    Fluzone >6mos 11/11/2022    Influenza TIV (IM) 01/13/2018    Influenza, Unspecified 01/13/2018, 09/19/2018    Tdap 11/19/2021     Social History:   Social History     Social History Narrative    Not on file     Social History     Socioeconomic History    Marital status: Single   Tobacco Use    Smoking status: Never    Smokeless tobacco: Never   Vaping Use    Vaping Use: Never used   Substance and Sexual Activity    Alcohol use: Yes     Alcohol/week: 20.0 standard drinks     Types: 10 Cans of beer, 10 Shots of liquor per week     Comment: pt states very rarely    Drug use: Yes     Frequency: 1.0 times per week     Types: Marijuana     Comment: Pt states he may have smoked marijuana 3 weeks ago (stated on 11-07-18)    Sexual activity: Not Currently     Partners: Female       Family History:  Family History   Problem Relation Age of Onset    Stroke Mother     Stroke Father         Review of Systems  See history of present illness and past medical history.  Patient denies headache, d syncope, trauma, change in vision, change in hearing, change in taste, changes in weight, changes in appetite, focal weakness, numbness, or paresthesia.  Patient denies chest pain, palpitations, dyspnea, orthopnea, PND, cough, sinus pressure, rhinorrhea, epistaxis, hemoptysis, nausea, vomiting,hematemesis, diarrhea, constipation or hematochezia.  Denies cold or heat intolerance, polydipsia, polyuria, polyphagia. Denies hematuria, pyuria, dysuria, hesitancy, frequency or urgency. Denies consumption of raw and under cooked meats foods or change in water source.  Denies fever, chills,  "sweats, night sweats.       Objective:  T Max 24 hrs: Temp (24hrs), Av.2 øF (36.8 øC), Min:97.7 øF (36.5 øC), Max:98.7 øF (37.1 øC)    Vitals Ranges:   Temp:  [97.7 øF (36.5 øC)-98.7 øF (37.1 øC)] 98 øF (36.7 øC)  Heart Rate:  [59-95] 62  Resp:  [17-18] 18  BP: (141-182)/() 175/119      Exam:  BP (!) 175/119 (BP Location: Right arm, Patient Position: Lying)   Pulse 62   Temp 98 øF (36.7 øC) (Oral)   Resp 18   Ht 187.9 cm (73.98\")   Wt 102 kg (225 lb)   SpO2 94%   BMI 28.91 kg/mý     General Appearance:    Alert, cooperative, no distress, appears stated age   Head:    Normocephalic, without obvious abnormality, atraumatic   Eyes:    PERRL, conjunctivae/corneas clear, EOM's intact, both eyes   Ears:    Normal external ear canals, both ears   Nose:   Nares normal, septum midline, mucosa normal, no drainage    or sinus tenderness   Throat:   Lips, mucosa, and tongue normal   Neck:   Supple, symmetrical, trachea midline, no adenopathy;     thyroid:  no enlargement/tenderness/nodules; no carotid    bruit or JVD   Back:     Symmetric, no curvature, ROM normal, no CVA tenderness   Lungs:     Clear to auscultation bilaterally, respirations unlabored   Chest Wall:    No tenderness or deformity    Heart:    Regular rate and rhythm, S1 and S2 normal, no murmur, rub   or gallop   Abdomen:     Soft, nontender, bowel sounds active all four quadrants,     no masses, no hepatomegaly, no splenomegaly   Extremities:   Extremities normal, atraumatic, no cyanosis or edema   Pulses:   2+ and symmetric all extremities   Skin:   Skin color, texture, turgor normal, no rashes or lesions   Lymph nodes:   Cervical, supraclavicular, and axillary nodes normal   Neurologic:   CNII-XII intact, normal strength, sensation intact throughout      .    Data Review:  Labs in chart were reviewed.  WBC   Date Value Ref Range Status   2023 10.04 3.40 - 10.80 10*3/mm3 Final     Hemoglobin   Date Value Ref Range Status   2023 " 16.4 13.0 - 17.7 g/dL Final     Hematocrit   Date Value Ref Range Status   08/19/2023 49.9 37.5 - 51.0 % Final     Platelets   Date Value Ref Range Status   08/19/2023 300 140 - 450 10*3/mm3 Final     Sodium   Date Value Ref Range Status   08/19/2023 144 136 - 145 mmol/L Final     Potassium   Date Value Ref Range Status   08/19/2023 4.1 3.5 - 5.2 mmol/L Final     Chloride   Date Value Ref Range Status   08/19/2023 105 98 - 107 mmol/L Final     CO2   Date Value Ref Range Status   08/19/2023 25.0 22.0 - 29.0 mmol/L Final     BUN   Date Value Ref Range Status   08/19/2023 22 (H) 6 - 20 mg/dL Final     Creatinine   Date Value Ref Range Status   08/19/2023 1.20 0.60 - 1.30 mg/dL Final     Comment:     Serial Number: [id number]:  [id number]   08/19/2023 1.22 0.76 - 1.27 mg/dL Final     Glucose   Date Value Ref Range Status   08/19/2023 142 (H) 65 - 99 mg/dL Final     Calcium   Date Value Ref Range Status   08/19/2023 10.3 8.6 - 10.5 mg/dL Final     AST (SGOT)   Date Value Ref Range Status   08/19/2023 18 1 - 40 U/L Final     ALT (SGPT)   Date Value Ref Range Status   08/19/2023 24 1 - 41 U/L Final     Alkaline Phosphatase   Date Value Ref Range Status   08/19/2023 91 39 - 117 U/L Final       Results from last 7 days   Lab Units 08/18/23  0531   TSH uIU/mL 0.288   FREE T4 ng/dL 1.39     Results from last 7 days   Lab Units 08/16/23  1458   HEMOGLOBIN A1C % 5.20      Imaging Results (All)       Procedure Component Value Units Date/Time    XR Chest 1 View [[id number]] Collected: 08/19/23 1831     Updated: 08/19/23 1905    Narrative:      STAT PORTABLE RADIOGRAPHIC VIEW OF THE CHEST     CLINICAL HISTORY: Acute stroke protocol.     FINDINGS:     The lungs are clear of acute infiltrates. The cardiomediastinal  silhouette is within normal limits. The osseous structures are  unremarkable.       Impression:         No active disease in the chest.     This report was finalized on 8/19/2023 7:02 PM by Dr. Aniceto Deleon M.D.       CT  Angiogram Head w AI Analysis of LVO [[id number]] Collected: 08/19/23 1829     Updated: 08/19/23 1834    Narrative:      CT ANGIOGRAM OF THE HEAD AND NECK AND CT PERFUSION STUDY     CLINICAL HISTORY: Difficulty standing up. Recent cerebellar infarct.     TECHNIQUE: A noncontrast head CT was performed with 3 mm axial images.  Thereafter, a CT perfusion study was performed after the dynamic bolus  of IV contrast. Standard perfusion maps were constructed with RAPID  software. A CT angiogram of the head and neck was then performed with 1  mm axial images. Sagittal, coronal, and 3-dimensional reconstructed  images were obtained. Finally, a delayed postcontrast head CT was  performed with 3 mm axial images. AI analysis of LVO was utilized.     COMPARISON: CT angiogram of the head and neck dated 08/16/2023 and CT  head dated 08/18/2023.     FINDINGS:     NONCONTRAST HEAD CT: On the previous MRI of the brain dated 08/16/2023,  there were 2 small foci of acute infarction noted within the inferior  aspect of the left cerebellar hemisphere within the left PICA  distribution. These are not well delineated on this CT examination.  There is no convincing new area of acute infarction on the head CT.     Incidental note is made of partial opacification of the left mastoid air  cells which was also seen on the prior head CT dated 08/18/2023.     CT PERFUSION STUDY: There is no abnormality in the CBF less than 30% or  the time to maximal enhancement greater than 6 second maps. However,  there are some areas of diminished perfusion within the left occipital  lobe on the time to maximum enhancement greater than 4 second maps and  measures up to 13 cc.     CTA NECK: There is a classic configuration of the aortic arch. There is  no significant great vessel origin stenosis. A mild degree of stenosis  is appreciated at the origin of the dominant right vertebral artery.  Atherosclerotic changes are identified within the carotid  bifurcations  and proximal internal carotids. However, strictly speaking, there is no  significant NASCET stenosis. Again, no significant interval change is  seen in the CT angiogram of the neck when compared to the prior study  dated 08/16/2023.     CTA HEAD: There is mild to moderate atherosclerotic irregularity and  narrowing noted within the V4 segment of the right vertebral artery as  well as the basilar artery. The basilar artery is unremarkable in  appearance. The posterior cerebral arteries are unremarkable.  Atherosclerotic calcifications are appreciated within the carotid  siphons resulting in only mild degrees of luminal compromise. The middle  and anterior cerebral arteries are unremarkable in appearance. When  compared to the prior CT angiogram of the head, there is no significant  interval change     DELAYED POSTCONTRAST HEAD CT: No abnormal foci of contrast enhancement  are identified within the brain parenchyma.       Impression:      There is no abnormality on the CBF less than 30% or the time  to maximum enhancement greater than 6 seconds perfusion maps. However,  there are some areas of diminished perfusion within the left occipital  lobe on the time to maximum enhancement greater than 4 second maps  measuring up to 13 cc that are within the left PCA distribution. The  findings are compatible with an area of low level diminished perfusion  within the left occipital lobe. No prior perfusion study was performed  and a new small acute infarct within the left occipital lobe could not  be excluded. Further evaluation could be performed with an MRI of the  brain for much more sensitive and specific area of acute infarction.     There is no significant interval change in the CT angiogram of the head  and neck when compared to the prior study dated 08/16/2023. There is  mild to moderate atherosclerotic irregularity and narrowing of the V4  segment of the right vertebral artery as well as the basilar  artery.  There is a moderate degree of stenosis within the origin of the right  vertebral artery. Atherosclerotic changes are identified within the  internal carotid arteries. However, there is no significant NASCET  stenosis.     These findings were contemporaneously discussed with Dr. Kraus.      Radiation dose reduction techniques were utilized, including automated  exposure control and exposure modulation based on body size.        This report was finalized on 8/19/2023 6:31 PM by Dr. Aniceto Deleon M.D.       CT Angiogram Neck [[id number]] Collected: 08/19/23 1829     Updated: 08/19/23 1834    Narrative:      CT ANGIOGRAM OF THE HEAD AND NECK AND CT PERFUSION STUDY     CLINICAL HISTORY: Difficulty standing up. Recent cerebellar infarct.     TECHNIQUE: A noncontrast head CT was performed with 3 mm axial images.  Thereafter, a CT perfusion study was performed after the dynamic bolus  of IV contrast. Standard perfusion maps were constructed with RAPID  software. A CT angiogram of the head and neck was then performed with 1  mm axial images. Sagittal, coronal, and 3-dimensional reconstructed  images were obtained. Finally, a delayed postcontrast head CT was  performed with 3 mm axial images. AI analysis of LVO was utilized.     COMPARISON: CT angiogram of the head and neck dated 08/16/2023 and CT  head dated 08/18/2023.     FINDINGS:     NONCONTRAST HEAD CT: On the previous MRI of the brain dated 08/16/2023,  there were 2 small foci of acute infarction noted within the inferior  aspect of the left cerebellar hemisphere within the left PICA  distribution. These are not well delineated on this CT examination.  There is no convincing new area of acute infarction on the head CT.     Incidental note is made of partial opacification of the left mastoid air  cells which was also seen on the prior head CT dated 08/18/2023.     CT PERFUSION STUDY: There is no abnormality in the CBF less than 30% or  the time to maximal  enhancement greater than 6 second maps. However,  there are some areas of diminished perfusion within the left occipital  lobe on the time to maximum enhancement greater than 4 second maps and  measures up to 13 cc.     CTA NECK: There is a classic configuration of the aortic arch. There is  no significant great vessel origin stenosis. A mild degree of stenosis  is appreciated at the origin of the dominant right vertebral artery.  Atherosclerotic changes are identified within the carotid bifurcations  and proximal internal carotids. However, strictly speaking, there is no  significant NASCET stenosis. Again, no significant interval change is  seen in the CT angiogram of the neck when compared to the prior study  dated 08/16/2023.     CTA HEAD: There is mild to moderate atherosclerotic irregularity and  narrowing noted within the V4 segment of the right vertebral artery as  well as the basilar artery. The basilar artery is unremarkable in  appearance. The posterior cerebral arteries are unremarkable.  Atherosclerotic calcifications are appreciated within the carotid  siphons resulting in only mild degrees of luminal compromise. The middle  and anterior cerebral arteries are unremarkable in appearance. When  compared to the prior CT angiogram of the head, there is no significant  interval change     DELAYED POSTCONTRAST HEAD CT: No abnormal foci of contrast enhancement  are identified within the brain parenchyma.       Impression:      There is no abnormality on the CBF less than 30% or the time  to maximum enhancement greater than 6 seconds perfusion maps. However,  there are some areas of diminished perfusion within the left occipital  lobe on the time to maximum enhancement greater than 4 second maps  measuring up to 13 cc that are within the left PCA distribution. The  findings are compatible with an area of low level diminished perfusion  within the left occipital lobe. No prior perfusion study was performed  and  a new small acute infarct within the left occipital lobe could not  be excluded. Further evaluation could be performed with an MRI of the  brain for much more sensitive and specific area of acute infarction.     There is no significant interval change in the CT angiogram of the head  and neck when compared to the prior study dated 08/16/2023. There is  mild to moderate atherosclerotic irregularity and narrowing of the V4  segment of the right vertebral artery as well as the basilar artery.  There is a moderate degree of stenosis within the origin of the right  vertebral artery. Atherosclerotic changes are identified within the  internal carotid arteries. However, there is no significant NASCET  stenosis.     These findings were contemporaneously discussed with Dr. Kraus.      Radiation dose reduction techniques were utilized, including automated  exposure control and exposure modulation based on body size.        This report was finalized on 8/19/2023 6:31 PM by Dr. Aniceto Deleon M.D.       CT CEREBRAL PERFUSION WITH & WITHOUT CONTRAST [[id number]] Collected: 08/19/23 1829     Updated: 08/19/23 1834    Narrative:      CT ANGIOGRAM OF THE HEAD AND NECK AND CT PERFUSION STUDY     CLINICAL HISTORY: Difficulty standing up. Recent cerebellar infarct.     TECHNIQUE: A noncontrast head CT was performed with 3 mm axial images.  Thereafter, a CT perfusion study was performed after the dynamic bolus  of IV contrast. Standard perfusion maps were constructed with RAPID  software. A CT angiogram of the head and neck was then performed with 1  mm axial images. Sagittal, coronal, and 3-dimensional reconstructed  images were obtained. Finally, a delayed postcontrast head CT was  performed with 3 mm axial images. AI analysis of LVO was utilized.     COMPARISON: CT angiogram of the head and neck dated 08/16/2023 and CT  head dated 08/18/2023.     FINDINGS:     NONCONTRAST HEAD CT: On the previous MRI of the brain dated  08/16/2023,  there were 2 small foci of acute infarction noted within the inferior  aspect of the left cerebellar hemisphere within the left PICA  distribution. These are not well delineated on this CT examination.  There is no convincing new area of acute infarction on the head CT.     Incidental note is made of partial opacification of the left mastoid air  cells which was also seen on the prior head CT dated 08/18/2023.     CT PERFUSION STUDY: There is no abnormality in the CBF less than 30% or  the time to maximal enhancement greater than 6 second maps. However,  there are some areas of diminished perfusion within the left occipital  lobe on the time to maximum enhancement greater than 4 second maps and  measures up to 13 cc.     CTA NECK: There is a classic configuration of the aortic arch. There is  no significant great vessel origin stenosis. A mild degree of stenosis  is appreciated at the origin of the dominant right vertebral artery.  Atherosclerotic changes are identified within the carotid bifurcations  and proximal internal carotids. However, strictly speaking, there is no  significant NASCET stenosis. Again, no significant interval change is  seen in the CT angiogram of the neck when compared to the prior study  dated 08/16/2023.     CTA HEAD: There is mild to moderate atherosclerotic irregularity and  narrowing noted within the V4 segment of the right vertebral artery as  well as the basilar artery. The basilar artery is unremarkable in  appearance. The posterior cerebral arteries are unremarkable.  Atherosclerotic calcifications are appreciated within the carotid  siphons resulting in only mild degrees of luminal compromise. The middle  and anterior cerebral arteries are unremarkable in appearance. When  compared to the prior CT angiogram of the head, there is no significant  interval change     DELAYED POSTCONTRAST HEAD CT: No abnormal foci of contrast enhancement  are identified within the brain  parenchyma.       Impression:      There is no abnormality on the CBF less than 30% or the time  to maximum enhancement greater than 6 seconds perfusion maps. However,  there are some areas of diminished perfusion within the left occipital  lobe on the time to maximum enhancement greater than 4 second maps  measuring up to 13 cc that are within the left PCA distribution. The  findings are compatible with an area of low level diminished perfusion  within the left occipital lobe. No prior perfusion study was performed  and a new small acute infarct within the left occipital lobe could not  be excluded. Further evaluation could be performed with an MRI of the  brain for much more sensitive and specific area of acute infarction.     There is no significant interval change in the CT angiogram of the head  and neck when compared to the prior study dated 08/16/2023. There is  mild to moderate atherosclerotic irregularity and narrowing of the V4  segment of the right vertebral artery as well as the basilar artery.  There is a moderate degree of stenosis within the origin of the right  vertebral artery. Atherosclerotic changes are identified within the  internal carotid arteries. However, there is no significant NASCET  stenosis.     These findings were contemporaneously discussed with Dr. Kraus.      Radiation dose reduction techniques were utilized, including automated  exposure control and exposure modulation based on body size.        This report was finalized on 8/19/2023 6:31 PM by Dr. Aniceto Deleon M.D.                 Assessment:  Active Hospital Problems    Diagnosis  POA    **Ataxia [R27.0]  Yes      Resolved Hospital Problems   No resolved problems to display.   Fall  Dizziness  Hypertension  hyperglycemia    Plan:  Stroke workup  Neurology consult  Therapies evaluation  Trend labs  Dw patient and ed provider    Xena Cooper MD  8/19/2023  22:39 EDT      Electronically signed by Xena Cooper MD  "at 23 2245          Physician Progress Notes (last 72 hours)        Christian Romero MD at 23 1530          DAILY PROGRESS NOTE  [facility]    Patient Identification:  Name: Flako Coreas  Age: 58 y.o.  Sex: male  :  1965  MRN: [id number]         Primary Care Physician: Akash Rodriguez Jr., DO      Subjective  Patient with no new complaints.  Major issue is still feeling dizzy.    Objective:  General Appearance:  Comfortable, well-appearing, in no acute distress and not in pain.    Vital signs: (most recent): Blood pressure 146/92, pulse 82, temperature 97.9 øF (36.6 øC), temperature source Oral, resp. rate 16, height 187.9 cm (73.98\"), weight 102 kg (225 lb), SpO2 98 %.    Lungs:  Normal effort and normal respiratory rate.  Breath sounds clear to auscultation.    Heart: Normal rate.  Regular rhythm.    Extremities: There is no dependent edema.    Neurological: Patient is alert and oriented to person, place and time.    Skin:  Warm and dry.                Vital signs in last 24 hours:  Temp:  [97.7 øF (36.5 øC)-98.2 øF (36.8 øC)] 97.9 øF (36.6 øC)  Heart Rate:  [64-82] 82  Resp:  [16-20] 16  BP: (134-161)/(80-99) 146/92    Intake/Output:    Intake/Output Summary (Last 24 hours) at 2023 1530  Last data filed at 2023 1048  Gross per 24 hour   Intake --   Output 975 ml   Net -975 ml         Results from last 7 days   Lab Units 23  0612 23  1628 23  0531 23  0434 23  1458   WBC 10*3/mm3 7.35 10.04 6.95 8.26 9.02   HEMOGLOBIN g/dL 14.4 16.4 14.3 14.5 15.2   PLATELETS 10*3/mm3 221 300 221 233 275     Results from last 7 days   Lab Units 23  0612 23  1654 23  1628 23  0531 23  0434 23  1458   SODIUM mmol/L 141  --  144 142 141 139   POTASSIUM mmol/L 4.0  --  4.1 3.8 3.9 3.8   CHLORIDE mmol/L 109*  --  105 106 107 104   CO2 mmol/L 22.1  --  25.0 22.5 21.7* 19.9*   BUN mg/dL 20  --  22* 16 13 15 " "  CREATININE mg/dL 1.08 1.20 1.22 1.00 0.88 1.09   GLUCOSE mg/dL 94  --  142* 110* 96 124*   Estimated Creatinine Clearance: 97.3 mL/min (by C-G formula based on SCr of 1.08 mg/dL).  Results from last 7 days   Lab Units 23  0612 23  1628 23  0531 23  0434 23  1458   CALCIUM mg/dL 8.8 10.3 9.2 8.8 9.9   ALBUMIN g/dL 3.8 5.2  --   --  4.8   MAGNESIUM mg/dL  --   --   --   --  2.4     Results from last 7 days   Lab Units 23  0612 23  1628 23  1458   ALBUMIN g/dL 3.8 5.2 4.8   BILIRUBIN mg/dL 0.4 0.4 0.2   ALK PHOS U/L 62 91 74   AST (SGOT) U/L 9 18 13   ALT (SGPT) U/L 17  23       Assessment:  Ataxia: Residual previous CVA versus recurrent CVA.  Repeat MRI noted.  CVA: See above.  Continue dual antiplatelets.  Neurology input appreciated.  Await MEGHANN  Hypertension: Uncontrolled.  Improved.  Not at goal yet.  No stimulants on talk screen.  Add low-dose HCTZ.  Alcohol use:  Cannabinol use:      Plan:  Please see above.      Christian Romero MD  2023  15:30 EDT      Electronically signed by Christian Romero MD at 23 1533       Sravanthi Mcmahon APRN at 23 0955          DOS: 2023  NAME: Flako Coreas   : 1965  PCP: Akash Rodriguez Jr.,   Chief Complaint   Patient presents with    Dizziness     Stroke    Subjective: c/o vertigo with worsening gait since last night. Notes h/a as well. Denies double vision, focal weakness/numbness. Pt seen in follow up today, however the problem is new to the examiner.      Objective:  Vital signs: /86 (BP Location: Right arm, Patient Position: Lying)   Pulse 72   Temp 97.7 øF (36.5 øC) (Oral)   Resp 20   Ht 187.9 cm (73.98\")   Wt 102 kg (225 lb)   SpO2 96%   BMI 28.91 kg/mý       General appearance: Well developed, well nourished, well groomed, alert and cooperative.   HEENT: Normocephalic.   Neck: Supple  Cardiac: Regular rate and rhythm. No murmurs.   Peripheral Vasculature: " Radial pulses are equal and symmetric.  Chest Exam: Clear to auscultation bilaterally, no wheezes, no rhonchi.  Extremities: Normal, no edema.   Skin: No rashes or birthmarks.     Higher integrative function: Oriented to time, place, person, intact recent and remote memory, attention span, concentration and language. Spontaneous speech, fund of vocabulary are normal.   CN II: Normal visual fields.   CN III IV VI: Extraocular movements are full without nystagmus. Pupils are equal, round, and reactive to light.   CN V: Normal facial sensation.  CN VII: Facial movements are symmetric, no weakness.   CN VIII: Auditory acuity is normal.   CN IX & X: Symmetric palatal movement.   CN XI: Sternocleidomastoid and trapezius are normal. No weakness.   CN XII: The tongue is midline. No atrophy or fasciculations.   Motor: Normal muscle strength, bulk, and tone in upper and lower extremities. No fasciculations, rigidity, spasticity or abnormal movements.   Sensation: Normal light touch in all extremities.   Station and gait: Deferred.  Coordination: Finger to nose test showed no dysmetria. Rapid alternating movements were normal. Heel to shin normal.     Scheduled Meds:amLODIPine, 5 mg, Oral, Daily  aspirin, 81 mg, Oral, Daily  atorvastatin, 40 mg, Oral, Nightly  carvedilol, 6.25 mg, Oral, BID With Meals  clopidogrel, 75 mg, Oral, Daily  folic acid, 1 mg, Oral, Daily  hydrALAZINE, 100 mg, Oral, Q8H  lisinopril, 40 mg, Oral, Daily  multivitamin with minerals, 1 tablet, Oral, Daily  pantoprazole, 40 mg, Oral, BID AC  spironolactone, 25 mg, Oral, Daily  thiamine (B-1) IV, 200 mg, Intravenous, Q8H   Followed by  [START ON 8/25/2023] thiamine, 100 mg, Oral, Daily  vilazodone, 40 mg, Oral, Daily      Continuous Infusions:sodium chloride, 75 mL/hr, Last Rate: 75 mL/hr (08/20/23 1302)      PRN Meds:.  acetaminophen **OR** acetaminophen    bisacodyl    hydrOXYzine    LORazepam **OR** LORazepam **OR** LORazepam **OR** LORazepam **OR**  LORazepam **OR** LORazepam    Magnesium Standard Dose Replacement - Follow Nurse / BPA Driven Protocol    meclizine    ondansetron    sodium chloride    Laboratory results:  Lab Results   Component Value Date    GLUCOSE 94 08/20/2023    CALCIUM 8.8 08/20/2023     08/20/2023    K 4.0 08/20/2023    CO2 22.1 08/20/2023     (H) 08/20/2023    BUN 20 08/20/2023    CREATININE 1.08 08/20/2023    EGFRIFAFRI >60 09/28/2018    EGFRIFNONA 98 11/05/2018    BCR 18.5 08/20/2023    ANIONGAP 9.9 08/20/2023     Lab Results   Component Value Date    WBC 7.35 08/20/2023    HGB 14.4 08/20/2023    HCT 43.8 08/20/2023    MCV 80.8 08/20/2023     08/20/2023     Lab Results   Component Value Date    CHOL 117 08/20/2023    CHOL 194 08/16/2023     Lab Results   Component Value Date    HDL 34 (L) 08/20/2023    HDL 41 08/16/2023    HDL 46 05/11/2022     Lab Results   Component Value Date    LDL 62 08/20/2023     (H) 08/16/2023     (H) 05/11/2022     Lab Results   Component Value Date    TRIG 115 08/20/2023    TRIG 194 (H) 08/16/2023    TRIG 149 05/11/2022         Lab 08/16/23  1458   HEMOGLOBIN A1C 5.20      Review and interpretation of imaging MRI brain images viewed by me, shows cluster of left cerebellar strokes  Adult Transthoracic Echo Complete W/ Cont if Necessary Per Protocol (With Agitated Saline)    Result Date: 8/17/2023    Left ventricular systolic function is normal. Left ventricular ejection fraction appears to be 66 - 70%.   Left ventricular wall thickness is consistent with mild concentric hypertrophy.   Left ventricular diastolic function is consistent with (grade I) impaired relaxation.   Normal right ventricular cavity size and systolic function noted.   The left atrial cavity is mildly dilated.   Saline test results are negative.   There is no evidence of pericardial effusion.     CT Head Without Contrast    Result Date: 8/21/2023  CT SCAN OF THE HEAD WITHOUT CONTRAST ON 08/21/2023  CLINICAL  HISTORY: Nonspecific dizziness.  TECHNIQUE: Spiral CT images were obtained from the base of the skull to the vertex without intravenous contrast. Images were reformatted and submitted 3 mm thick axial, sagittal and coronal CT sections with brain algorithm.  This is correlated to a recent MRI of the brain on 08/16/2023, just 5 days ago, as well as a CT angiogram of the head and neck and CT perfusion study of the brain that was performed on 08/19/2023, just 2 days ago, and an MRI of the brain yesterday 08/20/2023.  FINDINGS: On the MRI of the brain on 08/16/2023, there were 2 separate tiny 5 mm acute infarcts in the left cerebellum, one in the inferior medial left cerebellum and one in the inferior left cerebellar white matter in the left PICA territory, and on subsequent MRI of the brain 4 days later on 08/20/2023, there were multiple tiny acute infarcts in the left cerebellum with at least 9 separate tiny 3 to 6 mm acute infarcts involving the mid and inferior medial left cerebellum, all in the left PICA territory. Unfortunately on this head CT, these infarcts are too small to appreciate. Clearly they were present on prior MRIs. There is minimal low-density in the periventricular white matter consistent with minimal small vessel disease. The remainder of the brain parenchyma is normal in attenuation. The ventricles are normal in size. I see no mass effect. There is no midline shift. No extra-axial fluid collections are identified. There is no evidence of acute intracranial hemorrhage. The calvarium and the skull base are normal in appearance. The paranasal sinuses and the mastoid air cells and middle ear cavities are clear.       1. Unfortunately, the 9 tiny 3 to 6 mm acute infarcts in the mid to inferior medial left cerebellum within the left PICA territory seen on yesterday's MRI of the brain 08/20/2023,  are too small to appreciate on the current head CT, but they were clearly present on yesterday's MRI of the  brain. Other than minimal small vessel disease in the cerebral white matter, th remainder of the head CT is within normal limits.  Radiation dose reduction techniques were utilized, including automated exposure control and exposure modulation based on body size.   This report was finalized on 8/21/2023 9:54 AM by Dr. Miguel Turner M.D.      CT Head Without Contrast    Result Date: 8/18/2023  CT HEAD WITHOUT CONTRAST  CLINICAL HISTORY: Dizziness. Followup infarcts.  TECHNIQUE: CT scan of the head was obtained with 3 mm axial soft tissue algorithm images. No intravenous contrast was administered. Sagittal and coronal reconstructions were obtained.  COMPARISON: Brain MRI dated 08/16/2023.  FINDINGS:   On the previous brain MRI, there were 2 small foci of acute to subacute infarction noted within the inferomedial aspect of the left cerebellar hemisphere that are within the left PICA distribution. The largest of these measures up to approximately 8 mm in diameter. These infarcts are not well delineated on this CT examination. No new intracranial abnormality is appreciated. There is no evidence for hemorrhagic transformation. Old lacunar disease is seen within the right thalamus and there are mild changes of chronic small vessel ischemic phenomenon.  The ventricles, sulci, and cisterns are age-appropriate. Incidental atherosclerotic calcifications are seen within the intracranial vasculature.  Incidental note is made of mild mucosal thickening within the left maxillary sinus and the ethmoid air cells.       On the prior MRI of the brain dated 08/16/2023, there were 2 subcentimeter foci of acute to subacute infarction within the inferior medial aspect left cerebellar hemisphere within the left PICA distribution. These infarcts are not well delineated on this noncontrast head CT. However, no new intracranial abnormality is identified on this head CT and there is no evidence for hemorrhagic transformation at the sites of  acute to subacute infarction.  Incidental note is made of mild changes of chronic small vessel ischemic phenomenon and old lacunar disease within the right thalamus.   Radiation dose reduction techniques were utilized, including automated exposure control and exposure modulation based on body size.    This report was finalized on 8/18/2023 3:25 PM by Dr. Aniceto Deleon M.D.      CT Angiogram Neck    Result Date: 8/19/2023  CT ANGIOGRAM OF THE HEAD AND NECK AND CT PERFUSION STUDY  CLINICAL HISTORY: Difficulty standing up. Recent cerebellar infarct.  TECHNIQUE: A noncontrast head CT was performed with 3 mm axial images. Thereafter, a CT perfusion study was performed after the dynamic bolus of IV contrast. Standard perfusion maps were constructed with RAPID software. A CT angiogram of the head and neck was then performed with 1 mm axial images. Sagittal, coronal, and 3-dimensional reconstructed images were obtained. Finally, a delayed postcontrast head CT was performed with 3 mm axial images. AI analysis of LVO was utilized.  COMPARISON: CT angiogram of the head and neck dated 08/16/2023 and CT head dated 08/18/2023.  FINDINGS:  NONCONTRAST HEAD CT: On the previous MRI of the brain dated 08/16/2023, there were 2 small foci of acute infarction noted within the inferior aspect of the left cerebellar hemisphere within the left PICA distribution. These are not well delineated on this CT examination. There is no convincing new area of acute infarction on the head CT.  Incidental note is made of partial opacification of the left mastoid air cells which was also seen on the prior head CT dated 08/18/2023.  CT PERFUSION STUDY: There is no abnormality in the CBF less than 30% or the time to maximal enhancement greater than 6 second maps. However, there are some areas of diminished perfusion within the left occipital lobe on the time to maximum enhancement greater than 4 second maps and measures up to 13 cc.  CTA NECK: There is a  classic configuration of the aortic arch. There is no significant great vessel origin stenosis. A mild degree of stenosis is appreciated at the origin of the dominant right vertebral artery. Atherosclerotic changes are identified within the carotid bifurcations and proximal internal carotids. However, strictly speaking, there is no significant NASCET stenosis. Again, no significant interval change is seen in the CT angiogram of the neck when compared to the prior study dated 08/16/2023.  CTA HEAD: There is mild to moderate atherosclerotic irregularity and narrowing noted within the V4 segment of the right vertebral artery as well as the basilar artery. The basilar artery is unremarkable in appearance. The posterior cerebral arteries are unremarkable. Atherosclerotic calcifications are appreciated within the carotid siphons resulting in only mild degrees of luminal compromise. The middle and anterior cerebral arteries are unremarkable in appearance. When compared to the prior CT angiogram of the head, there is no significant interval change  DELAYED POSTCONTRAST HEAD CT: No abnormal foci of contrast enhancement are identified within the brain parenchyma.      There is no abnormality on the CBF less than 30% or the time to maximum enhancement greater than 6 seconds perfusion maps. However, there are some areas of diminished perfusion within the left occipital lobe on the time to maximum enhancement greater than 4 second maps measuring up to 13 cc that are within the left PCA distribution. The findings are compatible with an area of low level diminished perfusion within the left occipital lobe. No prior perfusion study was performed and a new small acute infarct within the left occipital lobe could not be excluded. Further evaluation could be performed with an MRI of the brain for much more sensitive and specific area of acute infarction.  There is no significant interval change in the CT angiogram of the head and neck  when compared to the prior study dated 08/16/2023. There is mild to moderate atherosclerotic irregularity and narrowing of the V4 segment of the right vertebral artery as well as the basilar artery. There is a moderate degree of stenosis within the origin of the right vertebral artery. Atherosclerotic changes are identified within the internal carotid arteries. However, there is no significant NASCET stenosis.  These findings were contemporaneously discussed with Dr. Kraus.  Radiation dose reduction techniques were utilized, including automated exposure control and exposure modulation based on body size.   This report was finalized on 8/19/2023 6:31 PM by Dr. Aniceto Deleon M.D.      MRI Brain Without Contrast    Result Date: 8/20/2023  MRI OF THE BRAIN WITHOUT CONTRAST  CLINICAL HISTORY: Slurred speech, dizziness, gait issues, nausea and vomiting.  TECHNIQUE: MRI of the brain was obtained with sagittal T1, axial T1, axial FLAIR, axial T2, axial diffusion, and axial susceptibility weighted images.  COMPARISON:  Comparison is made to previous MRI of the brain dated 08/16/2023.  FINDINGS:  There are multiple small foci of acute infarction identified within the inferior aspect of the left cerebellar hemisphere that are within the left PICA distribution. The largest of these infarcts measures up to approximately 9 mm in diameter. Some of these infarcts are newly apparent when compared to the prior study dated 08/16/2023 although the current examination was performed on a 3 Rosa MRI unit as compared to the 1.5 Rosa MRI unit on the prior examination. As such, the current implemented diffusion weighted sequence is more sensitive. Therefore, it is difficult to assess whether these are truly new foci of infarction.  Otherwise, the ventricles, sulci, and cisterns are age-appropriate. There are mild changes of chronic small vessel ischemic phenomenon. Old lacunar disease is seen within the right thalamus. The midline  intracranial anatomy is unremarkable. Incidental note is made of a chronic microhemorrhage within the corticomedullary interface of the left superior frontal gyrus which is probably at the site of underlying amyloid. The major intracranial flow related signal voids are unremarkable.  There is mild mucosal thickening appreciated within the maxillary sinuses and the ethmoid air cells.  There is a small amount of fluid signal intensity within the left mastoid air cells that is statistically likely representative of an incidental mastoid effusion.       There is a small collection of numerous small foci of acute infarction within the inferior and medial aspect of the left cerebellar hemisphere within the left PICA distribution. A larger number of infarcts are appreciated on the current exam although it is difficult to assess whether these are truly new foci of acute infarction as the current study was performed on a more sensitive 3 Rosa MRI unit as compared to the previously implemented 1.5 Rosa MRI machine. As such, the diffusion-weighted imaging sequence is more sensitive on the current examination.  These findings were discussed with Dr. Kraus on 08/20/2023 at approximately 3:54 PM.   This report was finalized on 8/20/2023 4:18 PM by Dr. Aniceto Deleon M.D.      XR Chest 1 View    Result Date: 8/19/2023  STAT PORTABLE RADIOGRAPHIC VIEW OF THE CHEST  CLINICAL HISTORY: Acute stroke protocol.  FINDINGS:  The lungs are clear of acute infiltrates. The cardiomediastinal silhouette is within normal limits. The osseous structures are unremarkable.       No active disease in the chest.  This report was finalized on 8/19/2023 7:02 PM by Dr. Aniceto Deleon M.D.      CT Angiogram Head w AI Analysis of LVO    Result Date: 8/19/2023  CT ANGIOGRAM OF THE HEAD AND NECK AND CT PERFUSION STUDY  CLINICAL HISTORY: Difficulty standing up. Recent cerebellar infarct.  TECHNIQUE: A noncontrast head CT was performed with 3 mm axial images.  Thereafter, a CT perfusion study was performed after the dynamic bolus of IV contrast. Standard perfusion maps were constructed with RAPID software. A CT angiogram of the head and neck was then performed with 1 mm axial images. Sagittal, coronal, and 3-dimensional reconstructed images were obtained. Finally, a delayed postcontrast head CT was performed with 3 mm axial images. AI analysis of LVO was utilized.  COMPARISON: CT angiogram of the head and neck dated 08/16/2023 and CT head dated 08/18/2023.  FINDINGS:  NONCONTRAST HEAD CT: On the previous MRI of the brain dated 08/16/2023, there were 2 small foci of acute infarction noted within the inferior aspect of the left cerebellar hemisphere within the left PICA distribution. These are not well delineated on this CT examination. There is no convincing new area of acute infarction on the head CT.  Incidental note is made of partial opacification of the left mastoid air cells which was also seen on the prior head CT dated 08/18/2023.  CT PERFUSION STUDY: There is no abnormality in the CBF less than 30% or the time to maximal enhancement greater than 6 second maps. However, there are some areas of diminished perfusion within the left occipital lobe on the time to maximum enhancement greater than 4 second maps and measures up to 13 cc.  CTA NECK: There is a classic configuration of the aortic arch. There is no significant great vessel origin stenosis. A mild degree of stenosis is appreciated at the origin of the dominant right vertebral artery. Atherosclerotic changes are identified within the carotid bifurcations and proximal internal carotids. However, strictly speaking, there is no significant NASCET stenosis. Again, no significant interval change is seen in the CT angiogram of the neck when compared to the prior study dated 08/16/2023.  CTA HEAD: There is mild to moderate atherosclerotic irregularity and narrowing noted within the V4 segment of the right vertebral  artery as well as the basilar artery. The basilar artery is unremarkable in appearance. The posterior cerebral arteries are unremarkable. Atherosclerotic calcifications are appreciated within the carotid siphons resulting in only mild degrees of luminal compromise. The middle and anterior cerebral arteries are unremarkable in appearance. When compared to the prior CT angiogram of the head, there is no significant interval change  DELAYED POSTCONTRAST HEAD CT: No abnormal foci of contrast enhancement are identified within the brain parenchyma.      There is no abnormality on the CBF less than 30% or the time to maximum enhancement greater than 6 seconds perfusion maps. However, there are some areas of diminished perfusion within the left occipital lobe on the time to maximum enhancement greater than 4 second maps measuring up to 13 cc that are within the left PCA distribution. The findings are compatible with an area of low level diminished perfusion within the left occipital lobe. No prior perfusion study was performed and a new small acute infarct within the left occipital lobe could not be excluded. Further evaluation could be performed with an MRI of the brain for much more sensitive and specific area of acute infarction.  There is no significant interval change in the CT angiogram of the head and neck when compared to the prior study dated 08/16/2023. There is mild to moderate atherosclerotic irregularity and narrowing of the V4 segment of the right vertebral artery as well as the basilar artery. There is a moderate degree of stenosis within the origin of the right vertebral artery. Atherosclerotic changes are identified within the internal carotid arteries. However, there is no significant NASCET stenosis.  These findings were contemporaneously discussed with Dr. Kraus.  Radiation dose reduction techniques were utilized, including automated exposure control and exposure modulation based on body size.   This  report was finalized on 8/19/2023 6:31 PM by Dr. Aniceto Deleon M.D.      CT CEREBRAL PERFUSION WITH & WITHOUT CONTRAST    Result Date: 8/19/2023  CT ANGIOGRAM OF THE HEAD AND NECK AND CT PERFUSION STUDY  CLINICAL HISTORY: Difficulty standing up. Recent cerebellar infarct.  TECHNIQUE: A noncontrast head CT was performed with 3 mm axial images. Thereafter, a CT perfusion study was performed after the dynamic bolus of IV contrast. Standard perfusion maps were constructed with RAPID software. A CT angiogram of the head and neck was then performed with 1 mm axial images. Sagittal, coronal, and 3-dimensional reconstructed images were obtained. Finally, a delayed postcontrast head CT was performed with 3 mm axial images. AI analysis of LVO was utilized.  COMPARISON: CT angiogram of the head and neck dated 08/16/2023 and CT head dated 08/18/2023.  FINDINGS:  NONCONTRAST HEAD CT: On the previous MRI of the brain dated 08/16/2023, there were 2 small foci of acute infarction noted within the inferior aspect of the left cerebellar hemisphere within the left PICA distribution. These are not well delineated on this CT examination. There is no convincing new area of acute infarction on the head CT.  Incidental note is made of partial opacification of the left mastoid air cells which was also seen on the prior head CT dated 08/18/2023.  CT PERFUSION STUDY: There is no abnormality in the CBF less than 30% or the time to maximal enhancement greater than 6 second maps. However, there are some areas of diminished perfusion within the left occipital lobe on the time to maximum enhancement greater than 4 second maps and measures up to 13 cc.  CTA NECK: There is a classic configuration of the aortic arch. There is no significant great vessel origin stenosis. A mild degree of stenosis is appreciated at the origin of the dominant right vertebral artery. Atherosclerotic changes are identified within the carotid bifurcations and proximal  internal carotids. However, strictly speaking, there is no significant NASCET stenosis. Again, no significant interval change is seen in the CT angiogram of the neck when compared to the prior study dated 08/16/2023.  CTA HEAD: There is mild to moderate atherosclerotic irregularity and narrowing noted within the V4 segment of the right vertebral artery as well as the basilar artery. The basilar artery is unremarkable in appearance. The posterior cerebral arteries are unremarkable. Atherosclerotic calcifications are appreciated within the carotid siphons resulting in only mild degrees of luminal compromise. The middle and anterior cerebral arteries are unremarkable in appearance. When compared to the prior CT angiogram of the head, there is no significant interval change  DELAYED POSTCONTRAST HEAD CT: No abnormal foci of contrast enhancement are identified within the brain parenchyma.      There is no abnormality on the CBF less than 30% or the time to maximum enhancement greater than 6 seconds perfusion maps. However, there are some areas of diminished perfusion within the left occipital lobe on the time to maximum enhancement greater than 4 second maps measuring up to 13 cc that are within the left PCA distribution. The findings are compatible with an area of low level diminished perfusion within the left occipital lobe. No prior perfusion study was performed and a new small acute infarct within the left occipital lobe could not be excluded. Further evaluation could be performed with an MRI of the brain for much more sensitive and specific area of acute infarction.  There is no significant interval change in the CT angiogram of the head and neck when compared to the prior study dated 08/16/2023. There is mild to moderate atherosclerotic irregularity and narrowing of the V4 segment of the right vertebral artery as well as the basilar artery. There is a moderate degree of stenosis within the origin of the right  vertebral artery. Atherosclerotic changes are identified within the internal carotid arteries. However, there is no significant NASCET stenosis.  These findings were contemporaneously discussed with Dr. Kraus.  Radiation dose reduction techniques were utilized, including automated exposure control and exposure modulation based on body size.   This report was finalized on 8/19/2023 6:31 PM by Dr. Aniceto Deleon M.D.       Impression:  58-year-old male with hypertension, hyperlipidemia, ADHD, BPH, Lepe's esophagus, and daily alcohol use with recent admission for left cerebellar stroke who presented a few hours after discharge with worsening gait.  On presentation BP in the 140s to 170s over 90s to 130s.  There were no other focal neurologic symptoms.  During previous admission 2D echo was unremarkable for source of stroke.  Following his recent stroke he was started on DAPT for 21 days as well as Lipitor 80 mg and was discharged with a Zio patch.  Repeat CTA head/neck this admission redemonstrated mild to moderate stenosis V4 segment of the of the, moderate stenosis at the origin of the right vertebral artery, dominant right vertebral artery as well as basilar irregularity    Repeat MRI completed yesterday showed collection of numerous small foci of acute infarct in the left cerebellar hemisphere in the left PICA distribution, more infarcts appreciated but felt related to more sensitive MRI.  LDL was 119 prior to star ting statin, 62 with recent addition of Lipitor 80.  B12 500, TSH 0.288.  P2Y12 171.    Diagnosis:   Left cerebellar stroke, embolic versus hypertensive  Uncontrolled hypertension  Mixed hyperlipidemia  Daily alcohol use    Plan:  Discussed with Dr. Leon today, due to embolic appearance of stroke was cardiology to reevaluate for possible MEGHANN  He will need another Zio patch placed prior to discharge  Add meclizine 25 mg p.o. 3 times daily as needed vertigo/dizziness  Continue DAPT, Lipitor decreased  "to 40 mg daily, goal LDL less than 70  We will check for antiphospholipid antibodies  No more than 2 beers a day, recommend complete cessation  Neurochecks  BP control-needs better control, goal less than 140/90 long-term  Stroke Education  AARON/SCDs  PT/OT/ST  Will follow    Electronically signed by Sravanthi Mcmahon APRN at 23 1006       Christian Romero MD at 23 1637          DAILY PROGRESS NOTE  [facility]    Patient Identification:  Name: Flako Coreas  Age: 58 y.o.  Sex: male  :  1965  MRN: [id number]         Primary Care Physician: Akash Rodriguez Jr., DO      Subjective  Patient presently with no new complaints.  States he feels about the same.  Still unsteady.  States he has not had alcohol since prior to his previous admission.    Objective:  General Appearance:  Comfortable, well-appearing, in no acute distress and not in pain.    Vital signs: (most recent): Blood pressure 157/99, pulse 64, temperature 98.2 øF (36.8 øC), temperature source Oral, resp. rate 18, height 187.9 cm (73.98\"), weight 102 kg (225 lb), SpO2 97 %.    Lungs:  Normal effort and normal respiratory rate.  Breath sounds clear to auscultation.    Heart: Normal rate.  Regular rhythm.    Extremities: There is no dependent edema.    Neurological: Patient is alert and oriented to person, place and time.  (Hand  normal.  EOMI.).    Skin:  Warm and dry.                Vital signs in last 24 hours:  Temp:  [97.7 øF (36.5 øC)-98.6 øF (37 øC)] 98.2 øF (36.8 øC)  Heart Rate:  [55-80] 64  Resp:  [18] 18  BP: (139-176)/() 157/99    Intake/Output:    Intake/Output Summary (Last 24 hours) at 2023 1637  Last data filed at 2023 0941  Gross per 24 hour   Intake --   Output 1100 ml   Net -1100 ml         Results from last 7 days   Lab Units 23  0612 23  1628 23  0531 23  0434 23  1458   WBC 10*3/mm3 7.35 10.04 6.95 8.26 9.02   HEMOGLOBIN g/dL 14.4 16.4 " 14.3 14.5 15.2   PLATELETS 10*3/mm3 221 300 221 233 275     Results from last 7 days   Lab Units 08/20/23  0612 08/19/23  1654 08/19/23  1628 08/18/23  0531 08/17/23  0434 08/16/23  1458   SODIUM mmol/L 141  --  144 142 141 139   POTASSIUM mmol/L 4.0  --  4.1 3.8 3.9 3.8   CHLORIDE mmol/L 109*  --  105 106 107 104   CO2 mmol/L 22.1  --  25.0 22.5 21.7* 19.9*   BUN mg/dL 20  --  22* 16 13 15   CREATININE mg/dL 1.08 1.20 1.22 1.00 0.88 1.09   GLUCOSE mg/dL 94  --  142* 110* 96 124*   Estimated Creatinine Clearance: 97.3 mL/min (by C-G formula based on SCr of 1.08 mg/dL).  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  Repeat MRI noted.  CVA: See above.  Continue dual antiplatelets.  Neurology input appreciated.    Hypertension: Uncontrolled.  Presently improved.  Combination of small multiple CVAs, uncontrolled hypertension-we will assess for possibility of cocaine/amphetamines.  Alcohol use:    All problems new to this examiner.    Plan:  Please see above for    Christian Romero MD  8/20/2023  16:37 EDT      Electronically signed by Christian Romero MD at 08/20/23 1644          Consult Notes (last 72 hours)        Vinita Boss MD at 08/20/23 1215        Consult Orders    1. Inpatient Neurology Consult Stroke [[id number]] ordered by Xena Cooper MD at 08/19/23 1814    2. Inpatient Neurology Consult Stroke [[id number]] ordered by Peter Gonzales MD at 08/19/23 1639    3. Inpatient Neurology Consult Stroke [[id number]] ordered  by Peter Gonzales MD at 08/19/23 1639                 Neurology Consult Note    Consult Date: 8/20/2023    Referring MD: Xena Cooper, *    Reason for Consult I have been asked to see the patient in neurological consultation to render advice and opinion regarding recent left cerebellar stroke, unsteady gait.    Flako Coreas is a 58 y.o. white male with known diagnosis of uncontrolled hypertension, mixed hyperlipidemia, alcohol use, recent left cerebellar stroke with residual gait imbalance who got discharged home yesterday then few hours later his symptoms/wobbly gait got worse and he presented again to the hospital, he stated that he was veering to the left side during walking and that felt worse than his gait prior to the discharge and he decided to come back to the hospital.  On presentation he was found to have a blood pressure that running high 140s-170s/90s to 130s diastolic, he denied other focal neurological symptoms.  He was discharged on dual antiplatelet therapy with aspirin 81 mg, Plavix 75 mg daily for 21 days, Lipitor 80 mg daily.  His blood pressure he was discharged on hydralazine 1000 mg 3 times daily, amlodipine 5 mg daily, lisinopril 40 mg daily, spironolactone 25 mg daily.  On neurological examination right now he is at baseline with NIH score of 0, he had an abnormal gait with veering to the left side.  He states that he drinks 3-4 beers a day, denied smoking or drug illicit.  Past Medical History:   Diagnosis Date    ADHD (attention deficit hyperactivity disorder)     On going issue    Anxiety     Lepe's esophagus     Benign prostatic hyperplasia Surgery in 12/2021    Clotting disorder Intestinal    Depression     Diverticulitis     Diverticulosis     Duodenitis     Enteritis     Failure to thrive (child)     Family history of blood clots     GERD (gastroesophageal reflux disease)     Hyperlipidemia     Hypertension     Hypertensive emergency     Low testosterone      "Microcytosis     Pancreatitis     Pneumonia     PONV (postoperative nausea and vomiting)     Seasonal affective disorder     Shoulder injury     Stroke     Unexplained weight loss        Exam  /81 (BP Location: Right arm, Patient Position: Lying)   Pulse 80   Temp 97.8 øF (36.6 øC) (Oral)   Resp 18   Ht 187.9 cm (73.98\")   Wt 102 kg (225 lb)   SpO2 96%   BMI 28.91 kg/mý   Gen: NAD, vitals reviewed  MS: oriented x3, recent/remote memory intact, normal attention/concentration, language intact, no neglect.  CN: visual acuity grossly normal, PERRL, EOMI, no facial droop, no dysarthria  Motor: 5/5 throughout upper and lower extremities, normal tone  Sensory exam: Normal to light touch throughout  Coordination: Normal finger-nose-finger and heel-to-shin bilaterally  Reflexes: 2+ throughout with downgoing plantars  Gait: Abnormal with veering to the left side    DATA:    Lab Results   Component Value Date    GLUCOSE 94 08/20/2023    CALCIUM 8.8 08/20/2023     08/20/2023    K 4.0 08/20/2023    CO2 22.1 08/20/2023     (H) 08/20/2023    BUN 20 08/20/2023    CREATININE 1.08 08/20/2023    EGFRIFAFRI >60 09/28/2018    EGFRIFNONA 98 11/05/2018    BCR 18.5 08/20/2023    ANIONGAP 9.9 08/20/2023     Lab Results   Component Value Date    WBC 7.35 08/20/2023    HGB 14.4 08/20/2023    HCT 43.8 08/20/2023    MCV 80.8 08/20/2023     08/20/2023       Lab review: Stroke labs showed normal TSH, LDL 62 was 119 4 days ago, B12 A1c 5.2.  500,    Imaging review: I personally reviewed the CT angio head and neck that was recently  that showed no significant stenosis/occlusion, he had a dominant right vertebral artery and the left vert gets very narrowed after giving the PICA branch.  He had a tortuous blood vessel indicating uncontrolled hypertension.    CT perfusion reviewed and agree with radiology.  There is no abnormality in the CBF less than 30% or  the time to maximal enhancement greater than 6 second maps. " However,  there are some areas of diminished perfusion within the left occipital  lobe on the time to maximum enhancement greater than 4 second maps and  measures up to 13 cc.    MRI brain from his last admission personally reviewed and showed small tiny strokes on the left cerebellum.    2D echo from last admission EF 66-70, no LVT, left atrium mildly dilated with negative saline test.    Diagnoses:  -Worsening gait likely related to his recent cerebellar stroke with possible exacerbation due to uncontrolled hypertension, rule out any new cerebellar strokes.  -Uncontrolled hypertension  -Mixed hyperlipidemia  -Alcohol use    Pre-stroke MRS: 2  NIHSS: 0      PLAN:   1.  Continue dual antiplatelet therapy  2.  Decrease Lipitor to 40 mg daily his LDL 62 with goal less than 70.  3.  Repeat brain MRI to look for new strokes  4.  Recommend better blood pressure control with goal less than 140/90 for the long-term.  5.  PT/OT evaluation   6.  No need to repeat 2D echo  7.  Counseled the patient regarding alcohol use, advised no more than 2 beers a day, prefer to quit drinking.    We will follow-up on the MRI brain for further recommendations, thank you for the consult.    Medical Decision Making for this neurology consultation consists of the following:  Review of previous chart, including H/P, provider and nursing notes as applicable.  Review of medications and vital signs.  Review of previous labs, neuroimaging, and additional relevant diagnostics.   Interpretation of laboratory, imaging, and other diagnostic results  Discussion with other providers and family   Total face-to-face/floor time: 60 minutes.           Electronically signed by Vinita Boss MD at 08/20/23 0321

## 2023-08-22 NOTE — NURSING NOTE
Pt had MEGHANN doen this AM and unremarkable. Pt still c/o dizziness, and headaches and nausea. Pt is ambulatory with staff but barely worked with PT today. Pt possible rehab as no one to care for him at home 24/7

## 2023-08-22 NOTE — PROGRESS NOTES
"DAILY PROGRESS NOTE  [facility]    Patient Identification:  Name: Flako Coreas  Age: 58 y.o.  Sex: male  :  1965  MRN: [id number]         Primary Care Physician: Akash Rodriguez Jr., DO      Subjective  Patient with no new complaints.  Still dizzy and does not think he can go home and be able to take care of himself.    Objective:  General Appearance:  Comfortable, well-appearing, in no acute distress and not in pain.    Vital signs: (most recent): Blood pressure 153/93, pulse 58, temperature 97.9 øF (36.6 øC), temperature source Oral, resp. rate 18, height 187.9 cm (73.98\"), weight 102 kg (225 lb), SpO2 96 %.    Lungs:  Normal effort and normal respiratory rate.  Breath sounds clear to auscultation.    Heart: Normal rate.  Regular rhythm.    Extremities: There is no dependent edema.    Neurological: Patient is alert and oriented to person, place and time.    Skin:  Warm and dry.                Vital signs in last 24 hours:  Temp:  [97.9 øF (36.6 øC)-98.6 øF (37 øC)] 97.9 øF (36.6 øC)  Heart Rate:  [58-75] 58  Resp:  [16-22] 18  BP: ()/() 153/93    Intake/Output:    Intake/Output Summary (Last 24 hours) at 2023 1538  Last data filed at 2023 1535  Gross per 24 hour   Intake 3057.5 ml   Output 1250 ml   Net 1807.5 ml         Results from last 7 days   Lab Units 23  0612 23  1628 23  0531 23  0434 23  1458   WBC 10*3/mm3 7.35 10.04 6.95 8.26 9.02   HEMOGLOBIN g/dL 14.4 16.4 14.3 14.5 15.2   PLATELETS 10*3/mm3 221 300 221 233 275     Results from last 7 days   Lab Units 23  0612 23  1654 23  1628 23  0531 23  0434 08/16/23  1458   SODIUM mmol/L 141  --  144 142 141 139   POTASSIUM mmol/L 4.0  --  4.1 3.8 3.9 3.8   CHLORIDE mmol/L 109*  --  105 106 107 104   CO2 mmol/L 22.1  --  25.0 22.5 21.7* 19.9*   BUN mg/dL 20  --  22* 16 13 15   CREATININE mg/dL 1.08 1.20 1.22 1.00 0.88 1.09   GLUCOSE mg/dL 94  --  " 142* 110* 96 124*   Estimated Creatinine Clearance: 97.3 mL/min (by C-G formula based on SCr of 1.08 mg/dL).  Results from last 7 days   Lab Units 08/20/23  0612 08/19/23  1628 08/18/23  0531 08/17/23  0434 08/16/23  1458   CALCIUM mg/dL 8.8 10.3 9.2 8.8 9.9   ALBUMIN g/dL 3.8 5.2  --   --  4.8   MAGNESIUM mg/dL  --   --   --   --  2.4     Results from last 7 days   Lab Units 08/20/23  0612 08/19/23  1628 08/16/23  1458   ALBUMIN g/dL 3.8 5.2 4.8   BILIRUBIN mg/dL 0.4 0.4 0.2   ALK PHOS U/L 62 91 74   AST (SGOT) U/L 9 18 13   ALT (SGPT) U/L 17 24 23       Assessment:  Ataxia/vertigo: Secondary to cerebral CVAs..  Repeat MRI noted.  CVA: See above.  Continue dual antiplatelets.  Neurology input appreciated.  MEGHANN unremarkable.  Hypertension: Uncontrolled.  Improved.  Not at goal yet.  No stimulants on tox screen.  Low-dose HCTZ added.  Monitor response  Alcohol use:  Cannabinol use:      Plan:  Please see above.  Discharge planning.    Christian Romero MD  8/22/2023  15:38 EDT

## 2023-08-22 NOTE — THERAPY TREATMENT NOTE
Patient Name: Flako Coreas  : 1965    MRN: [id number]                              Today's Date: 2023       Admit Date: 2023    Visit Dx:     ICD-10-CM ICD-9-CM   1. Ataxia  R27.0 781.3   2. History of cerebellar stroke  Z86.73 V12.54   3. At risk for apnea  Z91.89 V49.89     Patient Active Problem List   Diagnosis    Mixed anxiety depressive disorder    Mixed hyperlipidemia    Essential hypertension    Diverticulosis    Nodule of spleen    Chronic bilateral lower abdominal pain    Lepe's esophagus without dysplasia    GERD (gastroesophageal reflux disease)    History of esophagitis    Dizziness    Acute CVA (cerebrovascular accident)    Hypertensive emergency    Ataxia    CVA (cerebral vascular accident)     Past Medical History:   Diagnosis Date    ADHD (attention deficit hyperactivity disorder)     On going issue    Anxiety     Lepe's esophagus     Benign prostatic hyperplasia Surgery in 2021    Clotting disorder Intestinal    Depression     Diverticulitis     Diverticulosis     Duodenitis     Enteritis     Failure to thrive (child)     Family history of blood clots     GERD (gastroesophageal reflux disease)     Hyperlipidemia     Hypertension     Hypertensive emergency     Low testosterone     Microcytosis     Pancreatitis     Pneumonia     PONV (postoperative nausea and vomiting)     Seasonal affective disorder     Shoulder injury     Stroke     Unexplained weight loss      Past Surgical History:   Procedure Laterality Date    COLON SURGERY      COLONOSCOPY      COLONOSCOPY N/A 2022    Procedure: COLONOSCOPY INTO CECUM WITH HOT SNARE POLYPECTOMY;  Surgeon: Albert Gallo MD;  Location: [facility] ENDOSCOPY;  Service: Gastroenterology;  Laterality: N/A;  PRE- GI BLEED  POST- DIVERTICULOSIS, POLYP    ENDOSCOPY N/A 12/10/2022    Procedure: ESOPHAGOGASTRODUODENOSCOPY;  Surgeon: Albert Gallo MD;  Location: [facility] ENDOSCOPY;  Service: Gastroenterology;  Laterality: N/A;  PRE-  DARK STOOLS  POST- BARRETTS ESOPHAGUS    FRACTURE SURGERY  1980    for broken arm    FRACTURE SURGERY      for broken hand    INCISION AND DRAINAGE ABSCESS  2015    anal    PROSTATE SURGERY      SMALL INTESTINE SURGERY      TONSILLECTOMY        General Information       Row Name 08/22/23 1608          Physical Therapy Time and Intention    Document Type therapy note (daily note)  -ST     Mode of Treatment individual therapy;physical therapy  -ST       Row Name 08/22/23 1608          General Information    Patient Profile Reviewed yes  -ST     Existing Precautions/Restrictions fall  -ST       Row Name 08/22/23 1608          Safety Issues, Functional Mobility    Safety Issues Affecting Function (Mobility) impulsivity;safety precautions follow-through/compliance  -ST     Impairments Affecting Function (Mobility) balance;endurance/activity tolerance  -ST     Comment, Safety Issues/Impairments (Mobility) gait belt, nonskid socks donned  -ST               User Key  (r) = Recorded By, (t) = Taken By, (c) = Cosigned By      Initials Name Provider Type    Brandi Roy PT Physical Therapist                   Mobility       Row Name 08/22/23 1608          Bed Mobility    Bed Mobility supine-sit;sit-supine  -ST     Supine-Sit Ursa (Bed Mobility) standby assist  -ST     Sit-Supine Ursa (Bed Mobility) standby assist  -ST     Assistive Device (Bed Mobility) bed rails;head of bed elevated  -ST     Comment, (Bed Mobility) VC for decreased speed  -ST       Row Name 08/22/23 1608          Sit-Stand Transfer    Sit-Stand Ursa (Transfers) verbal cues;nonverbal cues (demo/gesture);standby assist;contact guard  -ST     Assistive Device (Sit-Stand Transfers) walker, front-wheeled  -ST     Comment, (Sit-Stand Transfer) VC for decreased speed  -ST       Row Name 08/22/23 1608          Gait/Stairs (Locomotion)    Ursa Level (Gait) unable to assess  -ST     Comment, (Gait/Stairs) Pt too dizzy in  standing and reports nauea. requests back to bed  -ST               User Key  (r) = Recorded By, (t) = Taken By, (c) = Cosigned By      Initials Name Provider Type    Brandi Roy, CARIN Physical Therapist                   Obj/Interventions       Row Name 08/22/23 1609          Balance    Comment, Balance SBA sitting balance EOB. Focused on increased upright positioning tolerance with use of elevating HOB. able to sit x 5 min and stand with CGA/SBA for about 1 min before reporting increased nausea and needing to sit down.  -ST               User Key  (r) = Recorded By, (t) = Taken By, (c) = Cosigned By      Initials Name Provider Type    Brandi Roy, PT Physical Therapist                   Goals/Plan    No documentation.                  Clinical Impression       Row Name 08/22/23 1610          Pain    Pretreatment Pain Rating 0/10 - no pain  -ST     Posttreatment Pain Rating 0/10 - no pain  -ST       Row Name 08/22/23 1610          Plan of Care Review    Plan of Care Reviewed With patient  -ST     Progress no change  -ST     Outcome Evaluation Pt's activity tolerance remains limited d/t dizziness. Overall SBA to CGA wtih bed mobility and transfers. Utilized 2ww for increased support, comfort and stability for improved safety. Started session by raising HOB at small increments to increase tolerance for upright positioning. Able to sit EOB about 5 min and stand with walker about 1 min before reporting increased nausea and need to get back to bed. Educated pt on improtance of sitting upright in bed as much as is tolerated for improved ability to tolerance activity. Question patient's safety to return home alone at this time time, may require SNU vs acute rehab pending progress.  -ST       Row Name 08/22/23 1610          Therapy Assessment/Plan (PT)    Rehab Potential (PT) fair, will monitor progress closely  -ST     Criteria for Skilled Interventions Met (PT) yes  -ST     Therapy Frequency (PT) 6  times/wk  -ST       Row Name 08/22/23 1610          Positioning and Restraints    Pre-Treatment Position in bed  -ST     In Bed notified nsg;supine;call light within reach;encouraged to call for assist  -ST               User Key  (r) = Recorded By, (t) = Taken By, (c) = Cosigned By      Initials Name Provider Type    Brandi Roy, PT Physical Therapist                   Outcome Measures       Row Name 08/22/23 1612 08/22/23 0820       How much help from another person do you currently need...    Turning from your back to your side while in flat bed without using bedrails? 4  -ST 4  -MR    Moving from lying on back to sitting on the side of a flat bed without bedrails? 4  -ST 4  -MR    Moving to and from a bed to a chair (including a wheelchair)? 3  -ST 3  -MR    Standing up from a chair using your arms (e.g., wheelchair, bedside chair)? 3  -ST 3  -MR    Climbing 3-5 steps with a railing? 3  -ST 3  -MR    To walk in hospital room? 3  -ST 3  -MR    AM-PAC 6 Clicks Score (PT) 20  -ST 20  -MR    Highest level of mobility 6 --> Walked 10 steps or more  -ST 6 --> Walked 10 steps or more  -MR              User Key  (r) = Recorded By, (t) = Taken By, (c) = Cosigned By      Initials Name Provider Type    Brandi Roy, CARIN Physical Therapist    MR Guidry Danna, RN Registered Nurse                                 Physical Therapy Education       Title: PT OT SLP Therapies (In Progress)       Topic: Physical Therapy (In Progress)       Point: Mobility training (In Progress)       Learning Progress Summary             Patient Acceptance, E,TB, NR by  at 8/22/2023 1613    Acceptance, E,TB,D, DU,NR by  at 8/21/2023 1607    Acceptance, E,TB, VU,NR by  at 8/20/2023 1226                         Point: Home exercise program (In Progress)       Learning Progress Summary             Patient Acceptance, E,TB, NR by  at 8/22/2023 1613    Acceptance, E,TB,D, DU,NR by  at 8/21/2023 1607                          Point: Body mechanics (In Progress)       Learning Progress Summary             Patient Acceptance, E,TB, NR by  at 8/22/2023 1613    Acceptance, E,TB,D, DU,NR by  at 8/21/2023 1607    Acceptance, E,TB, VU,NR by  at 8/20/2023 1226                         Point: Precautions (In Progress)       Learning Progress Summary             Patient Acceptance, E,TB, NR by  at 8/22/2023 1613    Acceptance, E,TB,D, DU,NR by  at 8/21/2023 1607    Acceptance, E,TB, VU,NR by  at 8/20/2023 1226                                         User Key       Initials Effective Dates Name Provider Type Discipline     06/16/21 -  Marva Voss, PT Physical Therapist PT     09/22/22 -  Brandi Jiang PT Physical Therapist PT                  PT Recommendation and Plan     Plan of Care Reviewed With: patient  Progress: no change  Outcome Evaluation: Pt's activity tolerance remains limited d/t dizziness. Overall SBA to CGA wtih bed mobility and transfers. Utilized 2ww for increased support, comfort and stability for improved safety. Started session by raising HOB at small increments to increase tolerance for upright positioning. Able to sit EOB about 5 min and stand with walker about 1 min before reporting increased nausea and need to get back to bed. Educated pt on improtance of sitting upright in bed as much as is tolerated for improved ability to tolerance activity. Question patient's safety to return home alone at this time time, may require SNU vs acute rehab pending progress.     Time Calculation:         PT Charges       Row Name 08/22/23 1613             Time Calculation    Start Time 1435  -ST      Stop Time 1450  -ST      Time Calculation (min) 15 min  -ST      PT Received On 08/22/23  -ST      PT - Next Appointment 08/23/23  -ST         Time Calculation- PT    Total Timed Code Minutes- PT 15 minute(s)  -ST         Timed Charges    65951 - PT Therapeutic Activity Minutes 15  -ST         Total Minutes     Timed Charges Total Minutes 15  -ST       Total Minutes 15  -ST                User Key  (r) = Recorded By, (t) = Taken By, (c) = Cosigned By      Initials Name Provider Type    Brandi Roy PT Physical Therapist                  Therapy Charges for Today       Code Description Service Date Service Provider Modifiers Qty    [id number]  PT THERAPEUTIC ACT EA 15 MIN 8/22/2023 Brandi Jiang PT GP 1            PT G-Codes  Outcome Measure Options: AM-PAC 6 Clicks Basic Mobility (PT)  AM-PAC 6 Clicks Score (PT): 20  AM-PAC 6 Clicks Score (OT): 19  Modified Candido Scale: 4 - Moderately severe disability.  Unable to walk without assistance, and unable to attend to own bodily needs without assistance.  PT Discharge Summary  Anticipated Discharge Disposition (PT): home with assist, inpatient rehabilitation facility, skilled nursing facility (pending progress)    Brandi Jiang PT  8/22/2023

## 2023-08-22 NOTE — DISCHARGE PLACEMENT REQUEST
"CoreasNimesh juarez (58 y.o. Male)       Date of Birth   1965    Social Security Number       Address   [address] [place] [address]    Home Phone   [phone]    MRN   [id number]       Zoroastrianism   Adventism    Marital Status   Single                            Admission Date   8/19/23    Admission Type   Emergency    Admitting Provider   Xena Cooper MD    Attending Provider   Christian Romero MD    Department, Room/Bed   [address], P596/1       Discharge Date       Discharge Disposition       Discharge Destination                                 Attending Provider: Christian Romero MD    Allergies: No Known Allergies    Isolation: None   Infection: None   Code Status: CPR    Ht: 187.9 cm (73.98\")   Wt: 102 kg (225 lb)    Admission Cmt: None   Principal Problem: Ataxia [R27.0]                   Active Insurance as of 8/19/2023       Primary Coverage       Payor Plan Insurance Group Employer/Plan Group    [employer]HEM BLUE CROSS ANTHEM BLUE CROSS BLUE SHIELD PPO [id number]       Payor Plan Address Payor Plan Phone Number Payor Plan Fax Number Effective Dates    [address] [phone]  1/1/2023 - None Entered    [facility] [id number]         Subscriber Name Subscriber Birth Date Member ID       NIMESH COREAS 1965 [id number]               Secondary Coverage       Payor Plan Insurance Group Employer/Plan Group    [facility] COMMUNITY PLAN [facility] COMMUNITY PLAN [facility]       Payor Plan Address Payor Plan Phone Number Payor Plan Fax Number Effective Dates    [address]   1/1/2021 - None Entered    [facility] [id number]         Subscriber Name Subscriber Birth Date Member ID       NIMESH COREAS 1965 [id number]                     Emergency Contacts        (Rel.) Home Phone Work Phone Mobile Phone    Nimesh Coreas (Son) [phone] -- [phone]              "

## 2023-08-22 NOTE — PLAN OF CARE
Goal Outcome Evaluation:  Plan of Care Reviewed With: patient        Progress: no change  Outcome Evaluation: Pt's activity tolerance remains limited d/t dizziness. Overall SBA to CGA wtih bed mobility and transfers. Utilized 2ww for increased support, comfort and stability for improved safety. Started session by raising HOB at small increments to increase tolerance for upright positioning. Able to sit EOB about 5 min and stand with walker about 1 min before reporting increased nausea and need to get back to bed. Educated pt on improtance of sitting upright in bed as much as is tolerated for improved ability to tolerance activity. Question patient's safety to return home alone at this time time, may require SNU vs acute rehab pending progress.      Anticipated Discharge Disposition (PT): home with assist, inpatient rehabilitation facility, skilled nursing facility (pending progress)

## 2023-08-22 NOTE — PROGRESS NOTES
"As per today's PT documentation--\"question patient's safety to return home alone at this time.  May require SNU vs. acute rehab pending progress\".  Discussed with patient who confirms there is no one albe to stay with him 24/7.  Left GEMA MORROW message for Yamilet (CCP).  Will continue to follow.  Thank you--Lyndsay Thakkar,  Rehab Admission Coordinator   "

## 2023-08-22 NOTE — ANESTHESIA POSTPROCEDURE EVALUATION
"Patient: Flako Coreas    Procedure Summary       Date: 08/22/23 Room / Location: [facility] PACU    Anesthesia Start: 0657 Anesthesia Stop: 0739    Procedure: ADULT TRANSESOPHAGEAL ECHO (MEGHANN) W/ CONT IF NECESSARY PER PROTOCOL Diagnosis: (Cardiac Source of Emboli)    Scheduled Providers:  Provider: Parker Reid MD    Anesthesia Type: MAC ASA Status: 3            Anesthesia Type: MAC    Vitals  Vitals Value Taken Time   /90 08/22/23 0800   Temp     Pulse 67 08/22/23 0802   Resp 20 08/22/23 0800   SpO2 96 % 08/22/23 0802   Vitals shown include unvalidated device data.        Post Anesthesia Care and Evaluation    Patient location during evaluation: PACU  Patient participation: complete - patient participated  Level of consciousness: awake and alert  Pain management: adequate    Airway patency: patent  Anesthetic complications: No anesthetic complications  PONV Status: controlled  Cardiovascular status: acceptable and hemodynamically stable  Respiratory status: acceptable  Hydration status: acceptable    Comments: /90   Pulse 75   Temp 36.9 øC (98.5 øF) (Oral)   Resp 20   Ht 187.9 cm (73.98\")   Wt 102 kg (225 lb)   SpO2 99%   BMI 28.91 kg/mý     "

## 2023-08-23 LAB
ANION GAP SERPL CALCULATED.3IONS-SCNC: 12 MMOL/L (ref 5–15)
BUN SERPL-MCNC: 14 MG/DL (ref 6–20)
BUN/CREAT SERPL: 13.6 (ref 7–25)
CALCIUM SPEC-SCNC: 8.5 MG/DL (ref 8.6–10.5)
CHLORIDE SERPL-SCNC: 106 MMOL/L (ref 98–107)
CO2 SERPL-SCNC: 23 MMOL/L (ref 22–29)
CREAT SERPL-MCNC: 1.03 MG/DL (ref 0.76–1.27)
EGFRCR SERPLBLD CKD-EPI 2021: 85.3 ML/MIN/1.73
GLUCOSE SERPL-MCNC: 95 MG/DL (ref 65–99)
POTASSIUM SERPL-SCNC: 3.7 MMOL/L (ref 3.5–5.2)
SODIUM SERPL-SCNC: 141 MMOL/L (ref 136–145)

## 2023-08-23 PROCEDURE — 97535 SELF CARE MNGMENT TRAINING: CPT

## 2023-08-23 PROCEDURE — 99232 SBSQ HOSP IP/OBS MODERATE 35: CPT | Performed by: NURSE PRACTITIONER

## 2023-08-23 PROCEDURE — 25010000002 THIAMINE HCL 200 MG/2ML SOLUTION: Performed by: NURSE PRACTITIONER

## 2023-08-23 PROCEDURE — 25010000002 ONDANSETRON PER 1 MG: Performed by: INTERNAL MEDICINE

## 2023-08-23 PROCEDURE — 80048 BASIC METABOLIC PNL TOTAL CA: CPT | Performed by: INTERNAL MEDICINE

## 2023-08-23 PROCEDURE — 97116 GAIT TRAINING THERAPY: CPT

## 2023-08-23 RX ORDER — LORAZEPAM 1 MG/1
1 TABLET ORAL EVERY 6 HOURS PRN
Status: DISCONTINUED | OUTPATIENT
Start: 2023-08-23 | End: 2023-08-24 | Stop reason: HOSPADM

## 2023-08-23 RX ADMIN — PANTOPRAZOLE SODIUM 40 MG: 40 TABLET, DELAYED RELEASE ORAL at 05:48

## 2023-08-23 RX ADMIN — HYDROXYZINE HYDROCHLORIDE 25 MG: 25 TABLET ORAL at 05:49

## 2023-08-23 RX ADMIN — VILAZODONE HYDROCHLORIDE 40 MG: 40 TABLET, FILM COATED ORAL at 09:20

## 2023-08-23 RX ADMIN — HYDRALAZINE HYDROCHLORIDE 100 MG: 50 TABLET, FILM COATED ORAL at 05:48

## 2023-08-23 RX ADMIN — ONDANSETRON 4 MG: 2 INJECTION INTRAMUSCULAR; INTRAVENOUS at 14:11

## 2023-08-23 RX ADMIN — HYDRALAZINE HYDROCHLORIDE 100 MG: 50 TABLET, FILM COATED ORAL at 14:10

## 2023-08-23 RX ADMIN — HYDROCHLOROTHIAZIDE 12.5 MG: 12.5 TABLET ORAL at 09:20

## 2023-08-23 RX ADMIN — ONDANSETRON 4 MG: 2 INJECTION INTRAMUSCULAR; INTRAVENOUS at 05:48

## 2023-08-23 RX ADMIN — THIAMINE HYDROCHLORIDE 200 MG: 100 INJECTION, SOLUTION INTRAMUSCULAR; INTRAVENOUS at 14:11

## 2023-08-23 RX ADMIN — ATORVASTATIN CALCIUM 40 MG: 20 TABLET, FILM COATED ORAL at 21:11

## 2023-08-23 RX ADMIN — THIAMINE HYDROCHLORIDE 200 MG: 100 INJECTION, SOLUTION INTRAMUSCULAR; INTRAVENOUS at 05:48

## 2023-08-23 RX ADMIN — CARVEDILOL 6.25 MG: 6.25 TABLET, FILM COATED ORAL at 09:20

## 2023-08-23 RX ADMIN — SPIRONOLACTONE 25 MG: 25 TABLET ORAL at 09:20

## 2023-08-23 RX ADMIN — HYDROXYZINE HYDROCHLORIDE 25 MG: 25 TABLET ORAL at 18:34

## 2023-08-23 RX ADMIN — AMLODIPINE BESYLATE 5 MG: 5 TABLET ORAL at 09:20

## 2023-08-23 RX ADMIN — HYDRALAZINE HYDROCHLORIDE 100 MG: 50 TABLET, FILM COATED ORAL at 21:11

## 2023-08-23 RX ADMIN — CLOPIDOGREL BISULFATE 75 MG: 75 TABLET, FILM COATED ORAL at 09:19

## 2023-08-23 RX ADMIN — THIAMINE HYDROCHLORIDE 200 MG: 100 INJECTION, SOLUTION INTRAMUSCULAR; INTRAVENOUS at 21:11

## 2023-08-23 RX ADMIN — LORAZEPAM 1 MG: 1 TABLET ORAL at 10:15

## 2023-08-23 RX ADMIN — ASPIRIN 81 MG: 81 TABLET, CHEWABLE ORAL at 09:20

## 2023-08-23 RX ADMIN — CARVEDILOL 6.25 MG: 6.25 TABLET, FILM COATED ORAL at 17:16

## 2023-08-23 RX ADMIN — FOLIC ACID 1 MG: 1 TABLET ORAL at 09:20

## 2023-08-23 RX ADMIN — LISINOPRIL 40 MG: 40 TABLET ORAL at 09:20

## 2023-08-23 RX ADMIN — PANTOPRAZOLE SODIUM 40 MG: 40 TABLET, DELAYED RELEASE ORAL at 17:17

## 2023-08-23 RX ADMIN — MULTIPLE VITAMINS W/ MINERALS TAB 1 TABLET: TAB at 09:20

## 2023-08-23 NOTE — TELEPHONE ENCOUNTER
Fidel-can you please arrange a 14-day Holter monitor to be placed in our office.  Order has been placed.  Thank you

## 2023-08-23 NOTE — PROGRESS NOTES
"    Patient Name: Flako Coreas  :1965  58 y.o.      Patient Care Team:  Akash Rodriguez Jr., DO as PCP - General (Sports Medicine)    Chief Complaint: follow up stroke    Interval History: tearful. Still with significant dizziness. Got two doses of meclizine yesterday and did not notice a difference.        Objective   Vital Signs  Temp:  [97.9 øF (36.6 øC)-99 øF (37.2 øC)] 98.6 øF (37 øC)  Heart Rate:  [58-80] 80  Resp:  [16-18] 16  BP: (127-165)/(60-93) 165/83    Intake/Output Summary (Last 24 hours) at 2023 1216  Last data filed at 2023 0610  Gross per 24 hour   Intake 1107.5 ml   Output 1800 ml   Net -692.5 ml     Flowsheet Rows      Flowsheet Row First Filed Value   Admission Height 187.9 cm (73.98\") Documented at 2023   Admission Weight 102 kg (225 lb) Documented at 2023            Physical Exam:   General Appearance:    Alert, cooperative, in no acute distress   Lungs:     Clear to auscultation.  Normal respiratory effort and rate.      Heart:    Regular rhythm and normal rate, normal S1 and S2, no murmurs, gallops or rubs.     Chest Wall:    No abnormalities observed   Abdomen:     Soft, nontender, positive bowel sounds.     Extremities:   no cyanosis, clubbing or edema.  No marked joint deformities.  Adequate musculoskeletal strength.       Results Review:    Results from last 7 days   Lab Units 23  0539   SODIUM mmol/L 141   POTASSIUM mmol/L 3.7   CHLORIDE mmol/L 106   CO2 mmol/L 23.0   BUN mg/dL 14   CREATININE mg/dL 1.03   GLUCOSE mg/dL 95   CALCIUM mg/dL 8.5*     Results from last 7 days   Lab Units 23  1628 23  1458   HSTROP T ng/L 8 6     Results from last 7 days   Lab Units 23  0612   WBC 10*3/mm3 7.35   HEMOGLOBIN g/dL 14.4   HEMATOCRIT % 43.8   PLATELETS 10*3/mm3 221     Results from last 7 days   Lab Units 23  1628   INR  0.90   APTT seconds 30.8     Results from last 7 days   Lab Units 23  1458   MAGNESIUM " mg/dL 2.4     Results from last 7 days   Lab Units 08/20/23  0612   CHOLESTEROL mg/dL 117   TRIGLYCERIDES mg/dL 115   HDL CHOL mg/dL 34*   LDL CHOL mg/dL 62               Medication Review:   amLODIPine, 5 mg, Oral, Daily  aspirin, 81 mg, Oral, Daily  atorvastatin, 40 mg, Oral, Nightly  carvedilol, 6.25 mg, Oral, BID With Meals  clopidogrel, 75 mg, Oral, Daily  folic acid, 1 mg, Oral, Daily  hydrALAZINE, 100 mg, Oral, Q8H  hydroCHLOROthiazide Oral, 12.5 mg, Oral, Daily  lisinopril, 40 mg, Oral, Daily  multivitamin with minerals, 1 tablet, Oral, Daily  pantoprazole, 40 mg, Oral, BID AC  spironolactone, 25 mg, Oral, Daily  thiamine (B-1) IV, 200 mg, Intravenous, Q8H   Followed by  [START ON 8/25/2023] thiamine, 100 mg, Oral, Daily  vilazodone, 40 mg, Oral, Daily              Assessment & Plan   Hypertensive urgency   Acute CVA left cerebellum  Hyperlipidemia  GERD with history of Lepe's esophagus   Anxiety     MEGHANN performed yesterday was unremarkable for source of stroke.   Plan to discharge with 2 week monitor.   Blood pressure seems to be improving. Will follow up on repeat vitals this afternoon.   Planning for rehab at discharge.     JANEEN Lara  [facility]  08/23/23  12:16 EDT

## 2023-08-23 NOTE — THERAPY TREATMENT NOTE
Patient Name: Flako Coreas  : 1965    MRN: [id number]                              Today's Date: 2023       Admit Date: 2023    Visit Dx:     ICD-10-CM ICD-9-CM   1. Ataxia  R27.0 781.3   2. History of cerebellar stroke  Z86.73 V12.54   3. At risk for apnea  Z91.89 V49.89     Patient Active Problem List   Diagnosis    Mixed anxiety depressive disorder    Mixed hyperlipidemia    Essential hypertension    Diverticulosis    Nodule of spleen    Chronic bilateral lower abdominal pain    Lepe's esophagus without dysplasia    GERD (gastroesophageal reflux disease)    History of esophagitis    Dizziness    Acute CVA (cerebrovascular accident)    Hypertensive emergency    Ataxia    CVA (cerebral vascular accident)     Past Medical History:   Diagnosis Date    ADHD (attention deficit hyperactivity disorder)     On going issue    Anxiety     Lepe's esophagus     Benign prostatic hyperplasia Surgery in 2021    Clotting disorder Intestinal    Depression     Diverticulitis     Diverticulosis     Duodenitis     Enteritis     Failure to thrive (child)     Family history of blood clots     GERD (gastroesophageal reflux disease)     Hyperlipidemia     Hypertension     Hypertensive emergency     Low testosterone     Microcytosis     Pancreatitis     Pneumonia     PONV (postoperative nausea and vomiting)     Seasonal affective disorder     Shoulder injury     Stroke     Unexplained weight loss      Past Surgical History:   Procedure Laterality Date    COLON SURGERY      COLONOSCOPY      COLONOSCOPY N/A 2022    Procedure: COLONOSCOPY INTO CECUM WITH HOT SNARE POLYPECTOMY;  Surgeon: Albert Gallo MD;  Location: [facility] ENDOSCOPY;  Service: Gastroenterology;  Laterality: N/A;  PRE- GI BLEED  POST- DIVERTICULOSIS, POLYP    ENDOSCOPY N/A 12/10/2022    Procedure: ESOPHAGOGASTRODUODENOSCOPY;  Surgeon: Albert Gallo MD;  Location: [facility] ENDOSCOPY;  Service: Gastroenterology;  Laterality: N/A;  PRE-  DARK STOOLS  POST- BARRETTS ESOPHAGUS    FRACTURE SURGERY  1980    for broken arm    FRACTURE SURGERY      for broken hand    INCISION AND DRAINAGE ABSCESS  2015    anal    PROSTATE SURGERY      SMALL INTESTINE SURGERY      TONSILLECTOMY        General Information       Row Name 08/23/23 1310          OT Time and Intention    Document Type therapy note (daily note)  -     Mode of Treatment occupational therapy  -       Row Name 08/23/23 1310          General Information    Patient Profile Reviewed yes  -     Existing Precautions/Restrictions fall  -       Row Name 08/23/23 1310          Cognition    Orientation Status (Cognition) oriented x 4  -       Row Name 08/23/23 1310          Safety Issues, Functional Mobility    Safety Issues Affecting Function (Mobility) insight into deficits/self-awareness;impulsivity;safety precaution awareness;safety precautions follow-through/compliance  -     Impairments Affecting Function (Mobility) balance;endurance/activity tolerance;cognition;motor control;motor planning  -     Cognitive Impairments, Mobility Safety/Performance awareness, need for assistance;impulsivity;problem-solving/reasoning;judgment;insight into deficits/self-awareness;safety precaution awareness;safety precaution follow-through  -               User Key  (r) = Recorded By, (t) = Taken By, (c) = Cosigned By      Initials Name Provider Type     Gissel Bailey OT Occupational Therapist                     Mobility/ADL's       Row Name 08/23/23 1312          Bed Mobility    Bed Mobility supine-sit;sit-supine  -     Supine-Sit Morton (Bed Mobility) standby assist  -     Sit-Supine [place] (Bed Mobility) standby assist  -     Assistive Device (Bed Mobility) bed rails;head of bed elevated  -     Comment, (Bed Mobility) dizziness upon sitting EOB  -       Row Name 08/23/23 1312          Sit-Stand Transfer    Sit-Stand Morton (Transfers) verbal cues;nonverbal cues  (demo/gesture);contact guard  -     Assistive Device (Sit-Stand Transfers) walker, front-wheeled  -       Row Name 08/23/23 1312          Functional Mobility    Functional Mobility- Ind. Level contact guard assist;minimum assist (75% patient effort)  -     Functional Mobility- Device walker, front-wheeled  -     Functional Mobility- Comment fxl ambulation into bathroom using RW, cues for safety and navigation. Moves very quickly and tends to  walker off the ground. L foot dragging, dec awareness and requires multiple cues to correct. Does have 1 LOB today with Natasha to crorrect  -       Row Name 08/23/23 1312          Activities of Daily Living    BADL Assessment/Intervention grooming  -       Row Name 08/23/23 1312          Grooming Assessment/Training    [place] Level (Grooming) wash face, hands;oral care regimen;set up;standby assist  -     Position (Grooming) sink side;unsupported sitting  -     Comment, (Grooming) rest break seated at sink with head down on counter d/t dizziness.  -               User Key  (r) = Recorded By, (t) = Taken By, (c) = Cosigned By      Initials Name Provider Type    Gissel Kemp OT Occupational Therapist                   Obj/Interventions       Row Name 08/23/23 1317          Balance    Static Standing Balance standby assist;contact guard  -     Dynamic Standing Balance minimal assist;contact guard  -     Position/Device Used, Standing Balance supported  -     Balance Interventions occupation based/functional task  -     Comment, Balance LOB x1 with Natasha to correct d/t L foot drag and dec coordination  -               User Key  (r) = Recorded By, (t) = Taken By, (c) = Cosigned By      Initials Name Provider Type    Gissel Kemp OT Occupational Therapist                   Goals/Plan    No documentation.                  Clinical Impression       Row Name 08/23/23 1317          Pain Assessment    Pretreatment Pain Rating 0/10 - no pain  -      Posttreatment Pain Rating 0/10 - no pain  -     Pre/Posttreatment Pain Comment only c/o dizziness  -       Row Name 08/23/23 1317          Plan of Care Review    Plan of Care Reviewed With patient  -     Outcome Evaluation Pt was found supine in bed, agreeable to OT session today. He sits EOB with SBA, rest break to accommodate for dizziness. He performs fxl ambulation into bathroom using RW but multiple cues for safety d/t impulsivity, moving very quickly, L foot drag with dec coordination and control and picking up walker from the ground. Pt quickly sits at the sink and puts his head on the counter with c/o dizziness. Is able to complete grooming tasks with set-up sitting at the sink. A Demo's L foot drag again on the way back into the room and returns to supine in bed. Cont to be limited by dizziness.  -       Row Name 08/23/23 1317          Positioning and Restraints    Pre-Treatment Position in bed  -     Post Treatment Position bed  -JW     In Bed notified nsg;fowlers;call light within reach;encouraged to call for assist;exit alarm on  -               User Key  (r) = Recorded By, (t) = Taken By, (c) = Cosigned By      Initials Name Provider Type    Gissel Kemp OT Occupational Therapist                   Outcome Measures       Row Name 08/23/23 1213 08/23/23 0800       How much help from another person do you currently need...    Turning from your back to your side while in flat bed without using bedrails? 4  -ST 4  -JM    Moving from lying on back to sitting on the side of a flat bed without bedrails? 4  -ST 4  -JM    Moving to and from a bed to a chair (including a wheelchair)? 3  -ST 3  -JM    Standing up from a chair using your arms (e.g., wheelchair, bedside chair)? 3  -ST 3  -JM    Climbing 3-5 steps with a railing? 2  -ST 3  -JM    To walk in hospital room? 3  -ST 3  -JM    AM-PAC 6 Clicks Score (PT) 19  -ST 20  -JM    Highest level of mobility 6 --> Walked 10 steps or more  -ST 6 -->  Walked 10 steps or more  -      Row Name 08/23/23 1213          Functional Assessment    Outcome Measure Options AM-PAC 6 Clicks Basic Mobility (PT)  -ST               User Key  (r) = Recorded By, (t) = Taken By, (c) = Cosigned By      Initials Name Provider Type    Rachele Saldivar, RN Registered Nurse    Brandi Roy, PT Physical Therapist                    Occupational Therapy Education       Title: PT OT SLP Therapies (In Progress)       Topic: Occupational Therapy (In Progress)       Point: ADL training (Done)       Description:   Instruct learner(s) on proper safety adaptation and remediation techniques during self care or transfers.   Instruct in proper use of assistive devices.                  Learning Progress Summary             Patient Acceptance, E, VU by  at 8/20/2023 1252    Comment: OT kit FERMIN recommendations, safety with using call light for OOB/  ADL assist as pt is a falls risk                         Point: Home exercise program (Not Started)       Description:   Instruct learner(s) on appropriate technique for monitoring, assisting and/or progressing therapeutic exercises/activities.                  Learner Progress:  Not documented in this visit.              Point: Precautions (Not Started)       Description:   Instruct learner(s) on prescribed precautions during self-care and functional transfers.                  Learner Progress:  Not documented in this visit.              Point: Body mechanics (Not Started)       Description:   Instruct learner(s) on proper positioning and spine alignment during self-care, functional mobility activities and/or exercises.                  Learner Progress:  Not documented in this visit.                              User Key       Initials Effective Dates Name Provider Type [facility] [place] 04/02/20 -  Rafaela Bhatti OT Occupational Therapist OT                  OT Recommendation and Plan     Plan of Care Review  Plan of Care Reviewed  With: patient  Outcome Evaluation: Pt was found supine in bed, agreeable to OT session today. He sits EOB with SBA, rest break to accommodate for dizziness. He performs fxl ambulation into bathroom using RW but multiple cues for safety d/t impulsivity, moving very quickly, L foot drag with dec coordination and control and picking up walker from the ground. Pt quickly sits at the sink and puts his head on the counter with c/o dizziness. Is able to complete grooming tasks with set-up sitting at the sink. A Demo's L foot drag again on the way back into the room and returns to supine in bed. Cont to be limited by dizziness.     Time Calculation:         Time Calculation- OT       Row Name 08/23/23 1322 08/23/23 1214          Time Calculation- OT    OT Start Time 0823 -JW --     OT Stop Time 0833 -JW --     OT Time Calculation (min) 10 min  -JW --     Total Timed Code Minutes- OT 10 minute(s)  -JW --     OT Received On 08/23/23  -JW --     OT - Next Appointment 08/24/23  -JW --        Timed Charges    26771 - Gait Training Minutes  -- 10  -ST     65275 - OT Self Care/Mgmt Minutes 10  -JW --        Total Minutes    Timed Charges Total Minutes 10  -JW 10  -ST      Total Minutes 10  -JW 10  -ST               User Key  (r) = Recorded By, (t) = Taken By, (c) = Cosigned By      Initials Name Provider Type    Gissel Kemp OT Occupational Therapist     Brandi Jiang, PT Physical Therapist                  Therapy Charges for Today       Code Description Service Date Service Provider Modifiers Qty    13414505620  OT SELF CARE/MGMT/TRAIN EA 15 MIN 8/23/2023 Gissel Bailey OT GO 1                 Gissel Bailey OT  8/23/2023

## 2023-08-23 NOTE — CASE MANAGEMENT/SOCIAL WORK
Continued Stay Note  [facility]     Patient Name: Flako Coreas  MRN: [id number]  Today's Date: 8/23/2023    Admit Date: 8/19/2023    Plan: [facility] skilled pending pre-cert   Discharge Plan       Row Name 08/23/23 1454       Plan    Plan [facility] skilled pending pre-cert    Patient/Family in Agreement with Plan yes    Plan Comments CCP followed up with the patient. Per So there are no beds available at [facility]. Per Dorinda there are no beds available at [facility]. Per Rubi she is checking other facilities for availability. Per Mary Jane there is a bed available at [facility] pending pre-cert. Patient is agreeable with [facility]. Mary Jane is initiating pre-cert and will follow up with CCP.                   Discharge Codes    No documentation.                 Expected Discharge Date and Time       Expected Discharge Date Expected Discharge Time    Aug 24, 2023

## 2023-08-23 NOTE — THERAPY TREATMENT NOTE
Patient Name: Flako Coreas  : 1965    MRN: [id number]                              Today's Date: 2023       Admit Date: 2023    Visit Dx:     ICD-10-CM ICD-9-CM   1. Ataxia  R27.0 781.3   2. History of cerebellar stroke  Z86.73 V12.54   3. At risk for apnea  Z91.89 V49.89     Patient Active Problem List   Diagnosis    Mixed anxiety depressive disorder    Mixed hyperlipidemia    Essential hypertension    Diverticulosis    Nodule of spleen    Chronic bilateral lower abdominal pain    Lepe's esophagus without dysplasia    GERD (gastroesophageal reflux disease)    History of esophagitis    Dizziness    Acute CVA (cerebrovascular accident)    Hypertensive emergency    Ataxia    CVA (cerebral vascular accident)     Past Medical History:   Diagnosis Date    ADHD (attention deficit hyperactivity disorder)     On going issue    Anxiety     Lepe's esophagus     Benign prostatic hyperplasia Surgery in 2021    Clotting disorder Intestinal    Depression     Diverticulitis     Diverticulosis     Duodenitis     Enteritis     Failure to thrive (child)     Family history of blood clots     GERD (gastroesophageal reflux disease)     Hyperlipidemia     Hypertension     Hypertensive emergency     Low testosterone     Microcytosis     Pancreatitis     Pneumonia     PONV (postoperative nausea and vomiting)     Seasonal affective disorder     Shoulder injury     Stroke     Unexplained weight loss      Past Surgical History:   Procedure Laterality Date    COLON SURGERY      COLONOSCOPY      COLONOSCOPY N/A 2022    Procedure: COLONOSCOPY INTO CECUM WITH HOT SNARE POLYPECTOMY;  Surgeon: lAbert Gallo MD;  Location: [facility] ENDOSCOPY;  Service: Gastroenterology;  Laterality: N/A;  PRE- GI BLEED  POST- DIVERTICULOSIS, POLYP    ENDOSCOPY N/A 12/10/2022    Procedure: ESOPHAGOGASTRODUODENOSCOPY;  Surgeon: Albert Gallo MD;  Location: [facility] ENDOSCOPY;  Service: Gastroenterology;  Laterality: N/A;  PRE-  DARK STOOLS  POST- BARRETTS ESOPHAGUS    FRACTURE SURGERY  1980    for broken arm    FRACTURE SURGERY      for broken hand    INCISION AND DRAINAGE ABSCESS  2015    anal    PROSTATE SURGERY      SMALL INTESTINE SURGERY      TONSILLECTOMY        General Information       Row Name 08/23/23 1209          Physical Therapy Time and Intention    Document Type therapy note (daily note)  -ST     Mode of Treatment individual therapy;physical therapy  -ST       Row Name 08/23/23 1209          General Information    Patient Profile Reviewed yes  -ST     Existing Precautions/Restrictions fall  -ST       Row Name 08/23/23 1209          Cognition    Orientation Status (Cognition) oriented x 4  -ST       Row Name 08/23/23 1209          Safety Issues, Functional Mobility    Safety Issues Affecting Function (Mobility) impulsivity;insight into deficits/self-awareness;awareness of need for assistance;safety precaution awareness;safety precautions follow-through/compliance  -     Impairments Affecting Function (Mobility) balance;endurance/activity tolerance  -ST     Comment, Safety Issues/Impairments (Mobility) gait belt, nonskid socks donned  -ST               User Key  (r) = Recorded By, (t) = Taken By, (c) = Cosigned By      Initials Name Provider Type    ST Brandi Jiang PT Physical Therapist                   Mobility       Row Name 08/23/23 1209          Bed Mobility    Bed Mobility supine-sit;sit-supine  -ST     Supine-Sit Castell (Bed Mobility) standby assist  -ST     Sit-Supine [place] (Bed Mobility) standby assist  -ST     Assistive Device (Bed Mobility) bed rails;head of bed elevated  -ST     Comment, (Bed Mobility) VC for decreased speed  -ST       Row Name 08/23/23 1209          Sit-Stand Transfer    Sit-Stand Castell (Transfers) verbal cues;nonverbal cues (demo/gesture);contact guard  -ST     Assistive Device (Sit-Stand Transfers) walker, front-wheeled  -ST     Comment, (Sit-Stand Transfer) VC for  hand placement  -ST       Row Name 08/23/23 1209          Gait/Stairs (Locomotion)    [place] Level (Gait) verbal cues;contact guard;minimum assist (75% patient effort)  -ST     Assistive Device (Gait) walker, front-wheeled  -ST     Distance in Feet (Gait) 20' x 2  -ST     Deviations/Abnormal Patterns (Gait) ataxic;base of support, narrow;julia decreased;stride length decreased  -ST     Bilateral Gait Deviations forward flexed posture  -ST     Right Sided Gait Deviations foot drop/toe drag  -ST     [place] Level (Stairs) not tested  -ST     Comment, (Gait/Stairs) Significant L foot drag at times, resulting in LOB and needing min A to recover.  -ST               User Key  (r) = Recorded By, (t) = Taken By, (c) = Cosigned By      Initials Name Provider Type    Brandi Roy, CARIN Physical Therapist                   Obj/Interventions       Row Name 08/23/23 1210          Strength Comprehensive (MMT)    Comment, General Manual Muscle Testing (MMT) Assessment noted L foot drag today: R ankle 5/5 DF and PF. L ankle 4/5 DF/PF. noted slightly weaker quad contraction in L with quad sets  -ST       Row Name 08/23/23 1210          Balance    Comment, Balance CGA for dynamic standing balance - frequent LOB d/t L foot drag requiring min A to recover. Constant cues for decreased speed and awareness  -ST               User Key  (r) = Recorded By, (t) = Taken By, (c) = Cosigned By      Initials Name Provider Type    Brandi Roy, CARIN Physical Therapist                   Goals/Plan    No documentation.                  Clinical Impression       Row Name 08/23/23 1211          Pain    Pretreatment Pain Rating 0/10 - no pain  -ST     Posttreatment Pain Rating 0/10 - no pain  -ST       Row Name 08/23/23 1211          Plan of Care Review    Plan of Care Reviewed With patient  -ST     Progress no change  -ST     Outcome Evaluation Pt with increased tolerance for therapy today - able to ambulate in room 2 x  20' with walker, CGA to min A. Noted to have L foot drag with ambulation that was not present upon PT evaluation a few days ago - updated RN. Pt with poor awareness of foot drag - frequent LOB needing min A to recover. Continues to be impulsive and needs constant cues to slow down. Activity continues to be limited by dizziness, pt returns to bed at end of session. Recommend SNU at this time, may be a good acute rehab candidate pending progress  -ST       Row Name 08/23/23 1211          Therapy Assessment/Plan (PT)    Rehab Potential (PT) fair, will monitor progress closely  -ST     Criteria for Skilled Interventions Met (PT) yes  -ST     Therapy Frequency (PT) 6 times/wk  -ST       Row Name 08/23/23 1211          Positioning and Restraints    Pre-Treatment Position in bed  -ST     Post Treatment Position bed  -ST     In Bed notified nsg;supine;call light within reach;encouraged to call for assist;exit alarm on  -ST               User Key  (r) = Recorded By, (t) = Taken By, (c) = Cosigned By      Initials Name Provider Type    Brandi Roy, PT Physical Therapist                   Outcome Measures       Row Name 08/23/23 1213 08/23/23 0800       How much help from another person do you currently need...    Turning from your back to your side while in flat bed without using bedrails? 4  -ST 4  -JM    Moving from lying on back to sitting on the side of a flat bed without bedrails? 4  -ST 4  -JM    Moving to and from a bed to a chair (including a wheelchair)? 3  -ST 3  -JM    Standing up from a chair using your arms (e.g., wheelchair, bedside chair)? 3  -ST 3  -JM    Climbing 3-5 steps with a railing? 2  -ST 3  -JM    To walk in hospital room? 3  -ST 3  -JM    AM-PAC 6 Clicks Score (PT) 19  -ST 20  -JM    Highest level of mobility 6 --> Walked 10 steps or more  -ST 6 --> Walked 10 steps or more  -      Row Name 08/23/23 1213          Functional Assessment    Outcome Measure Options AM-PAC 6 Clicks Basic  Mobility (PT)  -               User Key  (r) = Recorded By, (t) = Taken By, (c) = Cosigned By      Initials Name Provider Type    Rachele Saldivar, RN Registered Nurse     Brandi Jiang PT Physical Therapist                                 Physical Therapy Education       Title: PT OT SLP Therapies (In Progress)       Topic: Physical Therapy (In Progress)       Point: Mobility training (In Progress)       Learning Progress Summary             Patient Acceptance, TB,E, NR by  at 8/23/2023 1213    Acceptance, E,TB, NR by  at 8/22/2023 1613    Acceptance, E,TB,D, DU,NR by  at 8/21/2023 1607    Acceptance, E,TB, VU,NR by  at 8/20/2023 1226                         Point: Home exercise program (In Progress)       Learning Progress Summary             Patient Acceptance, TB,E, NR by  at 8/23/2023 1213    Acceptance, E,TB, NR by  at 8/22/2023 1613    Acceptance, E,TB,D, DU,NR by  at 8/21/2023 1607                         Point: Body mechanics (In Progress)       Learning Progress Summary             Patient Acceptance, TB,E, NR by  at 8/23/2023 1213    Acceptance, E,TB, NR by  at 8/22/2023 1613    Acceptance, E,TB,D, DU,NR by  at 8/21/2023 1607    Acceptance, E,TB, VU,NR by  at 8/20/2023 1226                         Point: Precautions (In Progress)       Learning Progress Summary             Patient Acceptance, TB,E, NR by  at 8/23/2023 1213    Acceptance, E,TB, NR by  at 8/22/2023 1613    Acceptance, E,TB,D, DU,NR by  at 8/21/2023 1607    Acceptance, E,TB, VU,NR by  at 8/20/2023 1226                                         User Key       Initials Effective Dates Name Provider Type [facility] 06/16/21 -  Marva Voss PT Physical Therapist PT     09/22/22 -  Brandi Jiang PT Physical Therapist PT                  PT Recommendation and Plan     Plan of Care Reviewed With: patient  Progress: no change  Outcome Evaluation: Pt with increased tolerance for therapy  today - able to ambulate in room 2 x 20' with walker, CGA to min A. Noted to have L foot drag with ambulation that was not present upon PT evaluation a few days ago - updated RN. Pt with poor awareness of foot drag - frequent LOB needing min A to recover. Continues to be impulsive and needs constant cues to slow down. Activity continues to be limited by dizziness, pt returns to bed at end of session. Recommend SNU at this time, may be a good acute rehab candidate pending progress     Time Calculation:         PT Charges       Row Name 08/23/23 1214             Time Calculation    Start Time 1133  -ST      Stop Time 1150  -ST      Time Calculation (min) 17 min  -ST      PT Received On 08/23/23  -ST      PT - Next Appointment 08/24/23  -ST         Time Calculation- PT    Total Timed Code Minutes- PT 17 minute(s)  -ST         Timed Charges    70954 - Gait Training Minutes  10  -ST      78699 - PT Therapeutic Activity Minutes 7  -ST         Total Minutes    Timed Charges Total Minutes 17  -ST       Total Minutes 17  -ST                User Key  (r) = Recorded By, (t) = Taken By, (c) = Cosigned By      Initials Name Provider Type    ST Brandi Jiang, CARIN Physical Therapist                  Therapy Charges for Today       Code Description Service Date Service Provider Modifiers Qty    74769133715 HC PT THERAPEUTIC ACT EA 15 MIN 8/22/2023 Brandi Jiang, PT GP 1    35488726196 HC GAIT TRAINING EA 15 MIN 8/23/2023 Brandi Jiang, PT GP 1            PT G-Codes  Outcome Measure Options: AM-PAC 6 Clicks Basic Mobility (PT)  AM-PAC 6 Clicks Score (PT): 19  AM-PAC 6 Clicks Score (OT): 19  Modified [place] Scale: 4 - Moderately severe disability.  Unable to walk without assistance, and unable to attend to own bodily needs without assistance.  PT Discharge Summary  Anticipated Discharge Disposition (PT): skilled nursing facility, inpatient rehabilitation facility    Brandi Jiang PT  8/23/2023

## 2023-08-23 NOTE — PLAN OF CARE
Goal Outcome Evaluation:  Plan of Care Reviewed With: patient        Progress: no change  Outcome Evaluation: Pt with increased tolerance for therapy today - able to ambulate in room 2 x 20' with walker, CGA to min A. Noted to have L foot drag with ambulation that was not present upon PT evaluation a few days ago - updated RN. Pt with poor awareness of foot drag - frequent LOB needing min A to recover. Continues to be impulsive and needs constant cues to slow down. Activity continues to be limited by dizziness, pt returns to bed at end of session. Recommend SNU at this time, may be a good acute rehab candidate pending progress      Anticipated Discharge Disposition (PT): skilled nursing facility, inpatient rehabilitation facility

## 2023-08-23 NOTE — PLAN OF CARE
Goal Outcome Evaluation:  Plan of Care Reviewed With: patient           Outcome Evaluation: Pt was found supine in bed, agreeable to OT session today. He sits EOB with SBA, rest break to accommodate for dizziness. He performs fxl ambulation into bathroom using RW but multiple cues for safety d/t impulsivity, moving very quickly, L foot drag with dec coordination and control and picking up walker from the ground. Pt quickly sits at the sink and puts his head on the counter with c/o dizziness. Is able to complete grooming tasks with set-up sitting at the sink. A Demo's L foot drag again on the way back into the room and returns to supine in bed. Cont to be limited by dizziness.

## 2023-08-23 NOTE — CASE MANAGEMENT/SOCIAL WORK
Continued Stay Note  [facility]     Patient Name: Flaok Coreas  MRN: [id number]  Today's Date: 8/23/2023    Admit Date: 8/19/2023    Plan: SNF referrals pending   Discharge Plan       Row Name 08/23/23 1001       Plan    Plan SNF referrals pending    Patient/Family in Agreement with Plan yes    Plan Comments CCP talked to patient about rehab referrals. Patient agreeable to broad SNF referrals. CCP made the referrals.                   Discharge Codes    No documentation.                 Expected Discharge Date and Time       Expected Discharge Date Expected Discharge Time    Aug 24, 2023

## 2023-08-23 NOTE — PROGRESS NOTES
Name: Flako Coreas ADMIT: 2023   : 1965  PCP: Akash Rodriguez Jr., DO    MRN: [id number] LOS: 1 days   AGE/SEX: 58 y.o. male  ROOM: formerly [facility]     Subjective   Subjective   Pt frustrated as he continues to have significant dizziness.     Objective   Objective   Vital Signs  Temp:  [98.4 øF (36.9 øC)-99 øF (37.2 øC)] 98.6 øF (37 øC)  Heart Rate:  [68-91] 81  Resp:  [16-18] 18  BP: (142-165)/() 153/101  SpO2:  [94 %-99 %] 94 %  on   ;   Device (Oxygen Therapy): room air  Body mass index is 28.91 kg/mý.  Physical Exam  Constitutional:       Appearance: Normal appearance.   Eyes:      Extraocular Movements: Extraocular movements intact.      Conjunctiva/sclera: Conjunctivae normal.      Pupils: Pupils are equal, round, and reactive to light.   Pulmonary:      Effort: Pulmonary effort is normal.   Musculoskeletal:      Right lower leg: No edema.      Left lower leg: No edema.   Skin:     General: Skin is warm.      Coloration: Skin is not jaundiced or pale.   Neurological:      General: No focal deficit present.      Mental Status: He is alert and oriented to person, place, and time.      Cranial Nerves: No cranial nerve deficit.      Comments: LLE 4/5 strength; RLE 5/5       Results Review     I reviewed the patient's new clinical results.  Results from last 7 days   Lab Units 23  0612 23  1628 23  0531 23  0434   WBC 10*3/mm3 7.35 10.04 6.95 8.26   HEMOGLOBIN g/dL 14.4 16.4 14.3 14.5   PLATELETS 10*3/mm3 221 300 221 233     Results from last 7 days   Lab Units 23  0539 23  0612 23  1654 23  1628 23  0531   SODIUM mmol/L 141 141  --  144 142   POTASSIUM mmol/L 3.7 4.0  --  4.1 3.8   CHLORIDE mmol/L 106 109*  --  105 106   CO2 mmol/L 23.0 22.1  --  25.0 22.5   BUN mg/dL 14 20  --  22* 16   CREATININE mg/dL 1.03 1.08 1.20 1.22 1.00   GLUCOSE mg/dL 95 94  --  142* 110*   EGFR mL/min/1.73 85.3 80.5  --  69.6 88.3     Results from last 7 days    Lab Units 08/20/23  0612 08/19/23  1628   ALBUMIN g/dL 3.8 5.2   BILIRUBIN mg/dL 0.4 0.4   ALK PHOS U/L 62 91   AST (SGOT) U/L 9 18   ALT (SGPT) U/L 17 24     Results from last 7 days   Lab Units 08/23/23  0539 08/20/23  0612 08/19/23  1628 08/18/23  0531   CALCIUM mg/dL 8.5* 8.8 10.3 9.2   ALBUMIN g/dL  --  3.8 5.2  --        No results found for: HGBA1C, POCGLU    No radiology results for the last day  Scheduled Medications  amLODIPine, 5 mg, Oral, Daily  aspirin, 81 mg, Oral, Daily  atorvastatin, 40 mg, Oral, Nightly  carvedilol, 6.25 mg, Oral, BID With Meals  clopidogrel, 75 mg, Oral, Daily  folic acid, 1 mg, Oral, Daily  hydrALAZINE, 100 mg, Oral, Q8H  hydroCHLOROthiazide Oral, 12.5 mg, Oral, Daily  lisinopril, 40 mg, Oral, Daily  multivitamin with minerals, 1 tablet, Oral, Daily  pantoprazole, 40 mg, Oral, BID AC  spironolactone, 25 mg, Oral, Daily  thiamine (B-1) IV, 200 mg, Intravenous, Q8H   Followed by  [START ON 8/25/2023] thiamine, 100 mg, Oral, Daily  vilazodone, 40 mg, Oral, Daily    Infusions   Diet  Diet: Cardiac Diets; Healthy Heart (2-3 Na+); Texture: Regular Texture (IDDSI 7); Fluid Consistency: Thin (IDDSI 0)       Assessment/Plan     Active Hospital Problems    Diagnosis  POA    **Ataxia [R27.0]  Yes    CVA (cerebral vascular accident) [I63.9]  Yes    Mixed hyperlipidemia [E78.2]  Yes    Essential hypertension [I10]  Yes      Resolved Hospital Problems   No resolved problems to display.       58 y.o. male admitted with Ataxia.      08/23/23  Long discussion with pt and friend at bedside. He is concerned that he is still dizzy and thinks that he is going to 'have a stroke' as soon as he exerts himself. I discussed at length that stroke w/u at this time not revealing as to exact cause but presumed embolic vs hypertensive. No evidence of clear source of embolism on MEGHANN. No LA appendage, PFO, or aortic arch atherosclerosis. I discussed current plan of DAPT for 21d then ASA monotherapy as  recommended by Stroke team for secondary prevention, and that we are unable to completely prevent an additional stroke from happening, though we do our best with current evidence based therapy to mitigate this risk. Also discussed plans for Zio patch on dc to eval for any underlying abnormal heart rhythms. Will ask Neurology team to speak with pt again to hopefully clarify some of his concerns.    Ataxia/vertigo  Secondary to cerebral CVAs    CVA  -Continue dual antiplatelets x21d   -Neurology now s/o; f/u in 3mo   -Zio patch at dc    Hypertension  -spironolactone 50mg, hydralazine 100mg q8h, amlodipine 5mg, lisinopril 40mg    Reported EtOH abuse  -Pt states he only drinks beers during 'football weekends'. Has never had hx of withdrawal or complications, friend at bedside agrees     SCDs for DVT prophylaxis.  Discussed with patient and friend .  Anticipate discharge SNF vs AR once arrangements have been made      Sathish Dyer MD  [facility]  08/23/23  22:23 EDT

## 2023-08-23 NOTE — PLAN OF CARE
Goal Outcome Evaluation:  Plan of Care Reviewed With: patient        Progress: improving  Outcome Evaluation: Went to the patient room at the start of shift and he was sleeping. Made small talk on him and he went back to sleep after giving his scheduled meds. Comfort measures and safety measures provided. No known allergies. Patient expresses no other needs at this time. Call light within reach. No further concerns as of present. Plan of care going.

## 2023-08-23 NOTE — DISCHARGE PLACEMENT REQUEST
"Nimesh Coreas (58 y.o. Male)       Date of Birth   1965    Social Security Number       Address   [address] [place] [address]    Home Phone   [phone]    MRN   [id number]       Buddhism   Muslim    Marital Status   Single                            Admission Date   8/19/23    Admission Type   Emergency    Admitting Provider   Xena Cooper MD    Attending Provider   Sathish Dyer MD    Department, Room/Bed   [address], P596/1       Discharge Date       Discharge Disposition       Discharge Destination                                 Attending Provider: Sathish Dyer MD    Allergies: No Known Allergies    Isolation: None   Infection: None   Code Status: CPR    Ht: 187.9 cm (73.98\")   Wt: 102 kg (225 lb)    Admission Cmt: None   Principal Problem: Ataxia [R27.0]                   Active Insurance as of 8/19/2023       Primary Coverage       Payor Plan Insurance Group Employer/Plan Group    [employer]HEM BLUE CROSS ANTHEM BLUE CROSS BLUE SHIELD PPO [id number]       Payor Plan Address Payor Plan Phone Number Payor Plan Fax Number Effective Dates    [address] [phone]  1/1/2023 - None Entered    [facility] [id number]         Subscriber Name Subscriber Birth Date Member ID       NIMESH COREAS 1965 [id number]               Secondary Coverage       Payor Plan Insurance Group Employer/Plan Group    [facility] COMMUNITY PLAN [facility] COMMUNITY PLAN Levine, Susan. \Hospital Has a New Name and Outlook.\""       Payor Plan Address Payor Plan Phone Number Payor Plan Fax Number Effective Dates    [address]   1/1/2021 - None Entered    [facility] [address]         Subscriber Name Subscriber Birth Date Member ID       NIMESH COREAS 1965 [id number]                     Emergency Contacts        (Rel.) Home Phone Work Phone Mobile Phone    LyudmilaNimesh (Son) [phone] -- [phone]              "

## 2023-08-23 NOTE — PROGRESS NOTES
[facility] Acute [facility]    Continuing to follow for progress with therapy. He does not have anticipated need for 24/7 asst at home.     Lo Han RN  Acute Rehab Admission Nurse

## 2023-08-24 ENCOUNTER — HOME HEALTH ADMISSION (OUTPATIENT)
Dept: HOME HEALTH SERVICES | Facility: HOME HEALTHCARE | Age: 58
End: 2023-08-24
Payer: COMMERCIAL

## 2023-08-24 ENCOUNTER — APPOINTMENT (OUTPATIENT)
Dept: CARDIOLOGY | Facility: HOSPITAL | Age: 58
DRG: 057 | End: 2023-08-24
Payer: COMMERCIAL

## 2023-08-24 VITALS
BODY MASS INDEX: 28.88 KG/M2 | DIASTOLIC BLOOD PRESSURE: 95 MMHG | SYSTOLIC BLOOD PRESSURE: 148 MMHG | WEIGHT: 225 LBS | HEART RATE: 70 BPM | HEIGHT: 74 IN | OXYGEN SATURATION: 97 % | TEMPERATURE: 98.8 F | RESPIRATION RATE: 20 BRPM

## 2023-08-24 PROCEDURE — 93246 EXT ECG>7D<15D RECORDING: CPT

## 2023-08-24 PROCEDURE — 25010000002 THIAMINE HCL 200 MG/2ML SOLUTION: Performed by: NURSE PRACTITIONER

## 2023-08-24 PROCEDURE — 99233 SBSQ HOSP IP/OBS HIGH 50: CPT | Performed by: PSYCHIATRY & NEUROLOGY

## 2023-08-24 PROCEDURE — 97112 NEUROMUSCULAR REEDUCATION: CPT

## 2023-08-24 PROCEDURE — 99232 SBSQ HOSP IP/OBS MODERATE 35: CPT | Performed by: NURSE PRACTITIONER

## 2023-08-24 PROCEDURE — 97116 GAIT TRAINING THERAPY: CPT

## 2023-08-24 RX ORDER — CARVEDILOL 12.5 MG/1
12.5 TABLET ORAL 2 TIMES DAILY WITH MEALS
Qty: 60 TABLET | Refills: 0 | Status: ON HOLD | OUTPATIENT
Start: 2023-08-24 | End: 2023-09-02

## 2023-08-24 RX ORDER — ATORVASTATIN CALCIUM 40 MG/1
40 TABLET, FILM COATED ORAL NIGHTLY
Qty: 90 TABLET | Refills: 1 | Status: SHIPPED | OUTPATIENT
Start: 2023-08-24

## 2023-08-24 RX ORDER — CARVEDILOL 6.25 MG/1
6.25 TABLET ORAL ONCE
Status: COMPLETED | OUTPATIENT
Start: 2023-08-24 | End: 2023-08-24

## 2023-08-24 RX ORDER — CARVEDILOL 12.5 MG/1
12.5 TABLET ORAL 2 TIMES DAILY WITH MEALS
Status: DISCONTINUED | OUTPATIENT
Start: 2023-08-24 | End: 2023-08-24 | Stop reason: HOSPADM

## 2023-08-24 RX ORDER — HYDROCHLOROTHIAZIDE 12.5 MG/1
12.5 TABLET ORAL DAILY
Qty: 30 TABLET | Refills: 1 | Status: SHIPPED | OUTPATIENT
Start: 2023-08-25

## 2023-08-24 RX ADMIN — HYDROCHLOROTHIAZIDE 12.5 MG: 12.5 TABLET ORAL at 08:33

## 2023-08-24 RX ADMIN — CLOPIDOGREL BISULFATE 75 MG: 75 TABLET, FILM COATED ORAL at 08:33

## 2023-08-24 RX ADMIN — VILAZODONE HYDROCHLORIDE 40 MG: 40 TABLET, FILM COATED ORAL at 08:33

## 2023-08-24 RX ADMIN — HYDROXYZINE HYDROCHLORIDE 25 MG: 25 TABLET ORAL at 04:16

## 2023-08-24 RX ADMIN — HYDRALAZINE HYDROCHLORIDE 100 MG: 50 TABLET, FILM COATED ORAL at 06:17

## 2023-08-24 RX ADMIN — CARVEDILOL 6.25 MG: 6.25 TABLET, FILM COATED ORAL at 08:53

## 2023-08-24 RX ADMIN — THIAMINE HYDROCHLORIDE 200 MG: 100 INJECTION, SOLUTION INTRAMUSCULAR; INTRAVENOUS at 06:17

## 2023-08-24 RX ADMIN — AMLODIPINE BESYLATE 5 MG: 5 TABLET ORAL at 08:33

## 2023-08-24 RX ADMIN — THIAMINE HYDROCHLORIDE 200 MG: 100 INJECTION, SOLUTION INTRAMUSCULAR; INTRAVENOUS at 13:36

## 2023-08-24 RX ADMIN — SPIRONOLACTONE 25 MG: 25 TABLET ORAL at 08:33

## 2023-08-24 RX ADMIN — CARVEDILOL 6.25 MG: 6.25 TABLET, FILM COATED ORAL at 08:33

## 2023-08-24 RX ADMIN — LISINOPRIL 40 MG: 40 TABLET ORAL at 08:33

## 2023-08-24 RX ADMIN — HYDRALAZINE HYDROCHLORIDE 100 MG: 50 TABLET, FILM COATED ORAL at 13:36

## 2023-08-24 RX ADMIN — ASPIRIN 81 MG: 81 TABLET, CHEWABLE ORAL at 08:33

## 2023-08-24 RX ADMIN — HYDROXYZINE HYDROCHLORIDE 25 MG: 25 TABLET ORAL at 16:22

## 2023-08-24 RX ADMIN — PANTOPRAZOLE SODIUM 40 MG: 40 TABLET, DELAYED RELEASE ORAL at 06:17

## 2023-08-24 RX ADMIN — FOLIC ACID 1 MG: 1 TABLET ORAL at 08:33

## 2023-08-24 RX ADMIN — MULTIPLE VITAMINS W/ MINERALS TAB 1 TABLET: TAB at 08:33

## 2023-08-24 NOTE — CASE MANAGEMENT/SOCIAL WORK
Case Management Discharge Note      Finale Note: Patient to [facility] skilled. Patient is agreeable to not smoking in or outside of the property while at [facility] for rehab, per facility request/requirement. No further needs. Caliber wheelchair scheduled for 5          Selected Continued Care - Admitted Since 8/19/2023       Destination Coordination complete.      Service Provider Selected Services Address Phone Fax Patient Preferred    [facility] [address] [facility] [id number] [phone] --              Durable Medical Equipment    No services have been selected for the patient.                Dialysis/Infusion    No services have been selected for the patient.                Home Medical Care    No services have been selected for the patient.                Therapy    No services have been selected for the patient.                Community Resources    No services have been selected for the patient.                Community & DME    No services have been selected for the patient.                    Transportation Services  W/C Van: [facility]    Final Discharge Disposition Code: 03 - skilled nursing facility (SNF)

## 2023-08-24 NOTE — PLAN OF CARE
Goal Outcome Evaluation:  Plan of Care Reviewed With: patient        Progress: improving  Outcome Evaluation: Patient is a 59 y/o male with the diagnosis of Ataxia. No complain of pain but did ask one time dose of anti-anxiety medication. Comfort and safety measures was provided to the patient. Patient expresses no other needs at this time. Call light within reach, alarm bed was set and provide with adequate lightning. No further concerns as of present. Plan of care ongoing.

## 2023-08-24 NOTE — PROGRESS NOTES
"    Patient Name: Flako Coreas  :1965  58 y.o.      Patient Care Team:  Akash Rodriguez Jr., DO as PCP - General (Sports Medicine)    Chief Complaint: follow up stroke    Interval History: he walked in the shelley with Dr. Leon. Still ataxic and dizzy.        Objective   Vital Signs  Temp:  [98.4 øF (36.9 øC)-98.8 øF (37.1 øC)] 98.6 øF (37 øC)  Heart Rate:  [63-91] 81  Resp:  [16-20] 20  BP: (135-164)/() 135/79  No intake or output data in the 24 hours ending 23 1055    Flowsheet Rows      Flowsheet Row First Filed Value   Admission Height 187.9 cm (73.98\") Documented at 2023   Admission Weight 102 kg (225 lb) Documented at 2023            Physical Exam:   General Appearance:    Alert, cooperative, in no acute distress   Lungs:     Clear to auscultation.  Normal respiratory effort and rate.      Heart:    Regular rhythm and normal rate, normal S1 and S2, no murmurs, gallops or rubs.     Chest Wall:    No abnormalities observed   Abdomen:     Soft, nontender, positive bowel sounds.     Extremities:   no cyanosis, clubbing or edema.  No marked joint deformities.  Adequate musculoskeletal strength.       Results Review:    Results from last 7 days   Lab Units 23  0539   SODIUM mmol/L 141   POTASSIUM mmol/L 3.7   CHLORIDE mmol/L 106   CO2 mmol/L 23.0   BUN mg/dL 14   CREATININE mg/dL 1.03   GLUCOSE mg/dL 95   CALCIUM mg/dL 8.5*     Results from last 7 days   Lab Units 23  1628   HSTROP T ng/L 8     Results from last 7 days   Lab Units 23  0612   WBC 10*3/mm3 7.35   HEMOGLOBIN g/dL 14.4   HEMATOCRIT % 43.8   PLATELETS 10*3/mm3 221     Results from last 7 days   Lab Units 23  1628   INR  0.90   APTT seconds 30.8           Results from last 7 days   Lab Units 23  0612   CHOLESTEROL mg/dL 117   TRIGLYCERIDES mg/dL 115   HDL CHOL mg/dL 34*   LDL CHOL mg/dL 62               Medication Review:   amLODIPine, 5 mg, Oral, Daily  aspirin, 81 mg, " Oral, Daily  atorvastatin, 40 mg, Oral, Nightly  carvedilol, 12.5 mg, Oral, BID With Meals  clopidogrel, 75 mg, Oral, Daily  folic acid, 1 mg, Oral, Daily  hydrALAZINE, 100 mg, Oral, Q8H  hydroCHLOROthiazide Oral, 12.5 mg, Oral, Daily  lisinopril, 40 mg, Oral, Daily  multivitamin with minerals, 1 tablet, Oral, Daily  pantoprazole, 40 mg, Oral, BID AC  spironolactone, 25 mg, Oral, Daily  thiamine (B-1) IV, 200 mg, Intravenous, Q8H   Followed by  [START ON 8/25/2023] thiamine, 100 mg, Oral, Daily  vilazodone, 40 mg, Oral, Daily              Assessment & Plan   Hypertensive urgency   Acute CVA left cerebellum  Hyperlipidemia  GERD with history of Lepe's esophagus   Anxiety     MEGHANN performed yesterday was unremarkable for source of stroke.   Plan to discharge with 2 week monitor. DAPT for 21 days per neuro.   Blood pressure seems to be improving but remains above goal. Increase beta blocker.     No objection to discharge any time. Will continue to follow BP closely as outpatient. He will keep BP log twice daily at home (once discharged from rehab) and bring with him to appointment.     Will see as needed. Keep appt 9/8/23 with JANEEN.     JANEEN Lara  [facility]  08/24/23  10:55 EDT

## 2023-08-24 NOTE — CASE MANAGEMENT/SOCIAL WORK
Continued Stay Note  [facility]     Patient Name: Flako Coreas  MRN: [id number]  Today's Date: 8/24/2023    Admit Date: 8/19/2023    Plan: [facility] skilled   Discharge Plan       Row Name 08/24/23 1100       Plan    Plan [facility] skilled    Patient/Family in Agreement with Plan yes    Plan Comments Per Dg at [facility] they have a bed available today and would not need pre-cert. CCP talked to patient about his options between [facility] and [facility] and he decided on [facility]. CCP let Dg know and contacted [place] to cancel pre-cert. Pharmacy updated and packet on rosalia.                   Discharge Codes    No documentation.                 Expected Discharge Date and Time       Expected Discharge Date Expected Discharge Time    Aug 24, 2023

## 2023-08-24 NOTE — DISCHARGE SUMMARY
Patient Name: Flako Coreas  : 1965  MRN: [id number]    Date of Admission: 2023  Date of Discharge:  2023  Primary Care Physician: Akash Rodriguez Jr., DO      Chief Complaint:   Dizziness      Discharge Diagnoses     Active Hospital Problems    Diagnosis  POA    **Ataxia [R27.0]  Yes    CVA (cerebral vascular accident) [I63.9]  Yes    Mixed hyperlipidemia [E78.2]  Yes    Essential hypertension [I10]  Yes      Resolved Hospital Problems   No resolved problems to display.        Brief Admitting HPI     58 year old gentleman who was discharged earlier today after he was diagnosed with a stroke; he also had uncontrolled hypertension; after arriving at home the patient continued to have dizziness, difficulty walking and falls; he has had blurred vision but no double vision; no fever or chills     Hospital Course     Pt admitted for persistent symptoms following recent admission for left cerebellar stroke.  He was again seen in consultation with neurology with MRI showing collection of numerous small foci of acute infarct in the left cerebellar hemisphere in the left PICA distribution.  Initially was thought that these were new infarcts, however they were attributed as more likely due to a more sensitive MRI and did not represent any new acute event.  MEGHANN was obtained which did not show any clear source of embolism.  Per stroke recommendations he will be on DAPT for 21 days, after which time he will continue ASA monotherapy.  P2 Y12 assay showed good response to Plavix and he will remain on this.  Patient will need an outpatient sleep evaluation and will be discharged with a 2-week Zio patch.  He was evaluated by physical therapy who recommend discharge to SNF and he is stable for discharge home at this time.  Outpatient referral for vestibular rehab was placed and discussed with patient and he is in agreement with treatment plan.    Discharge Plan     Ataxia/vertigo  Secondary to cerebral  CVAs  -Referral placed for vestibular rehab     CVA  -Continue dual antiplatelets x21d   -Neurology now s/o; f/u in 3mo   -Zio patch at dc     Hypertension  -spironolactone 50mg, hydralazine 100mg q8h, amlodipine 5mg, lisinopril 40mg     Reported EtOH abuse  -Pt states he only drinks beers during 'football weekends'. Has never had hx of withdrawal or complications, friend at bedside agrees     Day of Discharge     Physical Exam:  Temp:  [98.4 øF (36.9 øC)-98.8 øF (37.1 øC)] 98.8 øF (37.1 øC)  Heart Rate:  [63-91] 70  Resp:  [16-20] 20  BP: (135-155)/() 148/95  Body mass index is 28.91 kg/mý.  Physical Exam  Constitutional:       Appearance: Normal appearance.   Eyes:      Extraocular Movements: Extraocular movements intact.      Conjunctiva/sclera: Conjunctivae normal.      Pupils: Pupils are equal, round, and reactive to light.   Pulmonary:      Effort: Pulmonary effort is normal.   Musculoskeletal:      Right lower leg: No edema.      Left lower leg: No edema.   Skin:     General: Skin is warm.      Coloration: Skin is not jaundiced or pale.   Neurological:      General: No focal deficit present.      Mental Status: He is alert and oriented to person, place, and time.      Cranial Nerves: No cranial nerve deficit.      Comments: LLE 4/5 strength; RLE 5/5   Consultants     Consult Orders (all) (From admission, onward)       Start     Ordered    08/24/23 0717  Inpatient Neurology Consult General  Once,   Status:  Canceled        Specialty:  Neurology  Provider:  Pan Leon MD    08/24/23 0718    08/22/23 1536  Inpatient Case Management  Consult  Once        Provider:  (Not yet assigned)    08/22/23 1535    08/21/23 0955  Inpatient Cardiology Consult  Once        Specialty:  Cardiology  Provider:  Pk Worley Jr., MD    08/21/23 0954    08/19/23 1814  Notify Stroke Coordinator  Once        Provider:  (Not yet assigned)    08/19/23 1814    08/19/23 1814  Inpatient Rehab Admission  Consult  Once        Provider:  (Not yet assigned)    08/19/23 1814 08/19/23 1814  Consult to Case Management   Once        Provider:  (Not yet assigned)    08/19/23 1814 08/19/23 1814  Consult to Diabetes Educator  Once,   Status:  Canceled        Provider:  (Not yet assigned)    08/19/23 1814 08/19/23 1814  Inpatient Neurology Consult Stroke  Once        Specialty:  Neurology  Provider:  (Not yet assigned)    08/19/23 1814 08/19/23 1640  Inpatient Neurology Consult Stroke  Once        Specialty:  Neurology  Provider:  Georgina Kraus MD    08/19/23 1639    08/19/23 1640  Inpatient Neurology Consult Stroke  Once        Specialty:  Neurology  Provider:  Georgina Kraus MD    08/19/23 1639                  Procedures     * Surgery not found *      Imaging Results (All)       Procedure Component Value Units Date/Time    CT Head Without Contrast [[id number]] Collected: 08/21/23 0950     Updated: 08/21/23 0957    Narrative:      CT SCAN OF THE HEAD WITHOUT CONTRAST ON 08/21/2023      CLINICAL HISTORY: Nonspecific dizziness.     TECHNIQUE: Spiral CT images were obtained from the base of the skull to  the vertex without intravenous contrast. Images were reformatted and  submitted 3 mm thick axial, sagittal and coronal CT sections with brain  algorithm.     This is correlated to a recent MRI of the brain on 08/16/2023, just 5  days ago, as well as a CT angiogram of the head and neck and CT  perfusion study of the brain that was performed on 08/19/2023, just 2  days ago, and an MRI of the brain yesterday 08/20/2023.      FINDINGS: On the MRI of the brain on 08/16/2023, there were 2 separate  tiny 5 mm acute infarcts in the left cerebellum, one in the inferior  medial left cerebellum and one in the inferior left cerebellar white  matter in the left PICA territory, and on subsequent MRI of the brain 4  days later on 08/20/2023, there were multiple tiny acute infarcts in the  left cerebellum  with at least 9 separate tiny 3 to 6 mm acute infarcts  involving the mid and inferior medial left cerebellum, all in the left  PICA territory. Unfortunately on this head CT, these infarcts are too  small to appreciate. Clearly they were present on prior MRIs. There is  minimal low-density in the periventricular white matter consistent with  minimal small vessel disease. The remainder of the brain parenchyma is  normal in attenuation. The ventricles are normal in size. I see no mass  effect. There is no midline shift. No extra-axial fluid collections are  identified. There is no evidence of acute intracranial hemorrhage. The  calvarium and the skull base are normal in appearance. The paranasal  sinuses and the mastoid air cells and middle ear cavities are clear.       Impression:         1. Unfortunately, the 9 tiny 3 to 6 mm acute infarcts in the mid to  inferior medial left cerebellum within the left PICA territory seen on  yesterday's MRI of the brain 08/20/2023,  are too small to appreciate on  the current head CT, but they were clearly present on yesterday's MRI of  the brain. Other than minimal small vessel disease in the cerebral white  matter, th remainder of the head CT is within normal limits.     Radiation dose reduction techniques were utilized, including automated  exposure control and exposure modulation based on body size.        This report was finalized on 8/21/2023 9:54 AM by Dr. Miguel Turner M.D.       MRI Brain Without Contrast [[id number]] Collected: 08/20/23 1615     Updated: 08/20/23 1621    Narrative:      MRI OF THE BRAIN WITHOUT CONTRAST     CLINICAL HISTORY: Slurred speech, dizziness, gait issues, nausea and  vomiting.     TECHNIQUE: MRI of the brain was obtained with sagittal T1, axial T1,  axial FLAIR, axial T2, axial diffusion, and axial susceptibility  weighted images.     COMPARISON:  Comparison is made to previous MRI of the brain dated  08/16/2023.     FINDINGS:     There are  multiple small foci of acute infarction identified within the  inferior aspect of the left cerebellar hemisphere that are within the  left PICA distribution. The largest of these infarcts measures up to  approximately 9 mm in diameter. Some of these infarcts are newly  apparent when compared to the prior study dated 08/16/2023 although the  current examination was performed on a 3 Rosa MRI unit as compared to  the 1.5 Rosa MRI unit on the prior examination. As such, the current  implemented diffusion weighted sequence is more sensitive. Therefore, it  is difficult to assess whether these are truly new foci of infarction.     Otherwise, the ventricles, sulci, and cisterns are age-appropriate.  There are mild changes of chronic small vessel ischemic phenomenon. Old  lacunar disease is seen within the right thalamus. The midline  intracranial anatomy is unremarkable. Incidental note is made of a  chronic microhemorrhage within the corticomedullary interface of the  left superior frontal gyrus which is probably at the site of underlying  amyloid. The major intracranial flow related signal voids are  unremarkable.     There is mild mucosal thickening appreciated within the maxillary  sinuses and the ethmoid air cells.     There is a small amount of fluid signal intensity within the left  mastoid air cells that is statistically likely representative of an  incidental mastoid effusion.       Impression:         There is a small collection of numerous small foci of acute infarction  within the inferior and medial aspect of the left cerebellar hemisphere  within the left PICA distribution. A larger number of infarcts are  appreciated on the current exam although it is difficult to assess  whether these are truly new foci of acute infarction as the current  study was performed on a more sensitive 3 Rosa MRI unit as compared to  the previously implemented 1.5 Rosa MRI machine. As such, the  diffusion-weighted imaging  sequence is more sensitive on the current  examination.     These findings were discussed with Dr. Kraus on 08/20/2023 at  approximately 3:54 PM.         This report was finalized on 8/20/2023 4:18 PM by Dr. Aniceto Deleon M.D.       XR Chest 1 View [[id number]] Collected: 08/19/23 1831     Updated: 08/19/23 1905    Narrative:      STAT PORTABLE RADIOGRAPHIC VIEW OF THE CHEST     CLINICAL HISTORY: Acute stroke protocol.     FINDINGS:     The lungs are clear of acute infiltrates. The cardiomediastinal  silhouette is within normal limits. The osseous structures are  unremarkable.       Impression:         No active disease in the chest.     This report was finalized on 8/19/2023 7:02 PM by Dr. Aniceto Deleon M.D.       CT Angiogram Head w AI Analysis of LVO [[id number]] Collected: 08/19/23 1829     Updated: 08/19/23 1834    Narrative:      CT ANGIOGRAM OF THE HEAD AND NECK AND CT PERFUSION STUDY     CLINICAL HISTORY: Difficulty standing up. Recent cerebellar infarct.     TECHNIQUE: A noncontrast head CT was performed with 3 mm axial images.  Thereafter, a CT perfusion study was performed after the dynamic bolus  of IV contrast. Standard perfusion maps were constructed with RAPID  software. A CT angiogram of the head and neck was then performed with 1  mm axial images. Sagittal, coronal, and 3-dimensional reconstructed  images were obtained. Finally, a delayed postcontrast head CT was  performed with 3 mm axial images. AI analysis of LVO was utilized.     COMPARISON: CT angiogram of the head and neck dated 08/16/2023 and CT  head dated 08/18/2023.     FINDINGS:     NONCONTRAST HEAD CT: On the previous MRI of the brain dated 08/16/2023,  there were 2 small foci of acute infarction noted within the inferior  aspect of the left cerebellar hemisphere within the left PICA  distribution. These are not well delineated on this CT examination.  There is no convincing new area of acute infarction on the head CT.     Incidental note  is made of partial opacification of the left mastoid air  cells which was also seen on the prior head CT dated 08/18/2023.     CT PERFUSION STUDY: There is no abnormality in the CBF less than 30% or  the time to maximal enhancement greater than 6 second maps. However,  there are some areas of diminished perfusion within the left occipital  lobe on the time to maximum enhancement greater than 4 second maps and  measures up to 13 cc.     CTA NECK: There is a classic configuration of the aortic arch. There is  no significant great vessel origin stenosis. A mild degree of stenosis  is appreciated at the origin of the dominant right vertebral artery.  Atherosclerotic changes are identified within the carotid bifurcations  and proximal internal carotids. However, strictly speaking, there is no  significant NASCET stenosis. Again, no significant interval change is  seen in the CT angiogram of the neck when compared to the prior study  dated 08/16/2023.     CTA HEAD: There is mild to moderate atherosclerotic irregularity and  narrowing noted within the V4 segment of the right vertebral artery as  well as the basilar artery. The basilar artery is unremarkable in  appearance. The posterior cerebral arteries are unremarkable.  Atherosclerotic calcifications are appreciated within the carotid  siphons resulting in only mild degrees of luminal compromise. The middle  and anterior cerebral arteries are unremarkable in appearance. When  compared to the prior CT angiogram of the head, there is no significant  interval change     DELAYED POSTCONTRAST HEAD CT: No abnormal foci of contrast enhancement  are identified within the brain parenchyma.       Impression:      There is no abnormality on the CBF less than 30% or the time  to maximum enhancement greater than 6 seconds perfusion maps. However,  there are some areas of diminished perfusion within the left occipital  lobe on the time to maximum enhancement greater than 4 second  maps  measuring up to 13 cc that are within the left PCA distribution. The  findings are compatible with an area of low level diminished perfusion  within the left occipital lobe. No prior perfusion study was performed  and a new small acute infarct within the left occipital lobe could not  be excluded. Further evaluation could be performed with an MRI of the  brain for much more sensitive and specific area of acute infarction.     There is no significant interval change in the CT angiogram of the head  and neck when compared to the prior study dated 08/16/2023. There is  mild to moderate atherosclerotic irregularity and narrowing of the V4  segment of the right vertebral artery as well as the basilar artery.  There is a moderate degree of stenosis within the origin of the right  vertebral artery. Atherosclerotic changes are identified within the  internal carotid arteries. However, there is no significant NASCET  stenosis.     These findings were contemporaneously discussed with Dr. Kraus.      Radiation dose reduction techniques were utilized, including automated  exposure control and exposure modulation based on body size.        This report was finalized on 8/19/2023 6:31 PM by Dr. Aniceto Deleon M.D.       CT Angiogram Neck [[id number]] Collected: 08/19/23 1829     Updated: 08/19/23 1834    Narrative:      CT ANGIOGRAM OF THE HEAD AND NECK AND CT PERFUSION STUDY     CLINICAL HISTORY: Difficulty standing up. Recent cerebellar infarct.     TECHNIQUE: A noncontrast head CT was performed with 3 mm axial images.  Thereafter, a CT perfusion study was performed after the dynamic bolus  of IV contrast. Standard perfusion maps were constructed with RAPID  software. A CT angiogram of the head and neck was then performed with 1  mm axial images. Sagittal, coronal, and 3-dimensional reconstructed  images were obtained. Finally, a delayed postcontrast head CT was  performed with 3 mm axial images. AI analysis of LVO was  utilized.     COMPARISON: CT angiogram of the head and neck dated 08/16/2023 and CT  head dated 08/18/2023.     FINDINGS:     NONCONTRAST HEAD CT: On the previous MRI of the brain dated 08/16/2023,  there were 2 small foci of acute infarction noted within the inferior  aspect of the left cerebellar hemisphere within the left PICA  distribution. These are not well delineated on this CT examination.  There is no convincing new area of acute infarction on the head CT.     Incidental note is made of partial opacification of the left mastoid air  cells which was also seen on the prior head CT dated 08/18/2023.     CT PERFUSION STUDY: There is no abnormality in the CBF less than 30% or  the time to maximal enhancement greater than 6 second maps. However,  there are some areas of diminished perfusion within the left occipital  lobe on the time to maximum enhancement greater than 4 second maps and  measures up to 13 cc.     CTA NECK: There is a classic configuration of the aortic arch. There is  no significant great vessel origin stenosis. A mild degree of stenosis  is appreciated at the origin of the dominant right vertebral artery.  Atherosclerotic changes are identified within the carotid bifurcations  and proximal internal carotids. However, strictly speaking, there is no  significant NASCET stenosis. Again, no significant interval change is  seen in the CT angiogram of the neck when compared to the prior study  dated 08/16/2023.     CTA HEAD: There is mild to moderate atherosclerotic irregularity and  narrowing noted within the V4 segment of the right vertebral artery as  well as the basilar artery. The basilar artery is unremarkable in  appearance. The posterior cerebral arteries are unremarkable.  Atherosclerotic calcifications are appreciated within the carotid  siphons resulting in only mild degrees of luminal compromise. The middle  and anterior cerebral arteries are unremarkable in appearance. When  compared to  the prior CT angiogram of the head, there is no significant  interval change     DELAYED POSTCONTRAST HEAD CT: No abnormal foci of contrast enhancement  are identified within the brain parenchyma.       Impression:      There is no abnormality on the CBF less than 30% or the time  to maximum enhancement greater than 6 seconds perfusion maps. However,  there are some areas of diminished perfusion within the left occipital  lobe on the time to maximum enhancement greater than 4 second maps  measuring up to 13 cc that are within the left PCA distribution. The  findings are compatible with an area of low level diminished perfusion  within the left occipital lobe. No prior perfusion study was performed  and a new small acute infarct within the left occipital lobe could not  be excluded. Further evaluation could be performed with an MRI of the  brain for much more sensitive and specific area of acute infarction.     There is no significant interval change in the CT angiogram of the head  and neck when compared to the prior study dated 08/16/2023. There is  mild to moderate atherosclerotic irregularity and narrowing of the V4  segment of the right vertebral artery as well as the basilar artery.  There is a moderate degree of stenosis within the origin of the right  vertebral artery. Atherosclerotic changes are identified within the  internal carotid arteries. However, there is no significant NASCET  stenosis.     These findings were contemporaneously discussed with Dr. Kraus.      Radiation dose reduction techniques were utilized, including automated  exposure control and exposure modulation based on body size.        This report was finalized on 8/19/2023 6:31 PM by Dr. Aniceto Deleon M.D.       CT CEREBRAL PERFUSION WITH & WITHOUT CONTRAST [[id number]] Collected: 08/19/23 1829     Updated: 08/19/23 1834    Narrative:      CT ANGIOGRAM OF THE HEAD AND NECK AND CT PERFUSION STUDY     CLINICAL HISTORY: Difficulty standing up.  Recent cerebellar infarct.     TECHNIQUE: A noncontrast head CT was performed with 3 mm axial images.  Thereafter, a CT perfusion study was performed after the dynamic bolus  of IV contrast. Standard perfusion maps were constructed with RAPID  software. A CT angiogram of the head and neck was then performed with 1  mm axial images. Sagittal, coronal, and 3-dimensional reconstructed  images were obtained. Finally, a delayed postcontrast head CT was  performed with 3 mm axial images. AI analysis of LVO was utilized.     COMPARISON: CT angiogram of the head and neck dated 08/16/2023 and CT  head dated 08/18/2023.     FINDINGS:     NONCONTRAST HEAD CT: On the previous MRI of the brain dated 08/16/2023,  there were 2 small foci of acute infarction noted within the inferior  aspect of the left cerebellar hemisphere within the left PICA  distribution. These are not well delineated on this CT examination.  There is no convincing new area of acute infarction on the head CT.     Incidental note is made of partial opacification of the left mastoid air  cells which was also seen on the prior head CT dated 08/18/2023.     CT PERFUSION STUDY: There is no abnormality in the CBF less than 30% or  the time to maximal enhancement greater than 6 second maps. However,  there are some areas of diminished perfusion within the left occipital  lobe on the time to maximum enhancement greater than 4 second maps and  measures up to 13 cc.     CTA NECK: There is a classic configuration of the aortic arch. There is  no significant great vessel origin stenosis. A mild degree of stenosis  is appreciated at the origin of the dominant right vertebral artery.  Atherosclerotic changes are identified within the carotid bifurcations  and proximal internal carotids. However, strictly speaking, there is no  significant NASCET stenosis. Again, no significant interval change is  seen in the CT angiogram of the neck when compared to the prior study  dated  08/16/2023.     CTA HEAD: There is mild to moderate atherosclerotic irregularity and  narrowing noted within the V4 segment of the right vertebral artery as  well as the basilar artery. The basilar artery is unremarkable in  appearance. The posterior cerebral arteries are unremarkable.  Atherosclerotic calcifications are appreciated within the carotid  siphons resulting in only mild degrees of luminal compromise. The middle  and anterior cerebral arteries are unremarkable in appearance. When  compared to the prior CT angiogram of the head, there is no significant  interval change     DELAYED POSTCONTRAST HEAD CT: No abnormal foci of contrast enhancement  are identified within the brain parenchyma.       Impression:      There is no abnormality on the CBF less than 30% or the time  to maximum enhancement greater than 6 seconds perfusion maps. However,  there are some areas of diminished perfusion within the left occipital  lobe on the time to maximum enhancement greater than 4 second maps  measuring up to 13 cc that are within the left PCA distribution. The  findings are compatible with an area of low level diminished perfusion  within the left occipital lobe. No prior perfusion study was performed  and a new small acute infarct within the left occipital lobe could not  be excluded. Further evaluation could be performed with an MRI of the  brain for much more sensitive and specific area of acute infarction.     There is no significant interval change in the CT angiogram of the head  and neck when compared to the prior study dated 08/16/2023. There is  mild to moderate atherosclerotic irregularity and narrowing of the V4  segment of the right vertebral artery as well as the basilar artery.  There is a moderate degree of stenosis within the origin of the right  vertebral artery. Atherosclerotic changes are identified within the  internal carotid arteries. However, there is no significant NASCET  stenosis.     These  findings were contemporaneously discussed with Dr. Kraus.      Radiation dose reduction techniques were utilized, including automated  exposure control and exposure modulation based on body size.        This report was finalized on 8/19/2023 6:31 PM by Dr. Aniceto Deleon M.D.             Results for orders placed during the hospital encounter of 12/08/22    Duplex Renal Artery - Bilateral Complete CAR    Interpretation Summary    Normal right renal artery.    Normal left renal artery.    Results for orders placed during the hospital encounter of 08/19/23    Adult Transesophageal Echo (MEGHANN) W/ Cont if Necessary Per Protocol    Interpretation Summary    Left ventricular systolic function is hyperdynamic (EF > 70%).    Left ventricular wall thickness is consistent with mild to moderate concentric hypertrophy.    Left ventricular diastolic function is consistent with (grade I) impaired relaxation.    Normal right ventricular cavity size and systolic function noted.    The left atrial cavity is mildly dilated.    No evidence of a left atrial appendage thrombus was present    No evidence of a patent foramen ovale. Saline test results are negative.    There are very mild plaques in the distal descending thoracic aorta. There are no plaques in the aortic arch or ascending aorta    There is no evidence of pericardial effusion    Pertinent Labs     Results from last 7 days   Lab Units 08/20/23  0612 08/19/23  1628 08/18/23  0531   WBC 10*3/mm3 7.35 10.04 6.95   HEMOGLOBIN g/dL 14.4 16.4 14.3   PLATELETS 10*3/mm3 221 300 221     Results from last 7 days   Lab Units 08/23/23  0539 08/20/23  0612 08/19/23  1654 08/19/23  1628 08/18/23  0531   SODIUM mmol/L 141 141  --  144 142   POTASSIUM mmol/L 3.7 4.0  --  4.1 3.8   CHLORIDE mmol/L 106 109*  --  105 106   CO2 mmol/L 23.0 22.1  --  25.0 22.5   BUN mg/dL 14 20  --  22* 16   CREATININE mg/dL 1.03 1.08 1.20 1.22 1.00   GLUCOSE mg/dL 95 94  --  142* 110*   EGFR mL/min/1.73 85.3 80.5   --  69.6 88.3     Results from last 7 days   Lab Units 08/20/23  0612 08/19/23  1628   ALBUMIN g/dL 3.8 5.2   BILIRUBIN mg/dL 0.4 0.4   ALK PHOS U/L 62 91   AST (SGOT) U/L 9 18   ALT (SGPT) U/L 17 24     Results from last 7 days   Lab Units 08/23/23  0539 08/20/23  0612 08/19/23  1628 08/18/23  0531   CALCIUM mg/dL 8.5* 8.8 10.3 9.2   ALBUMIN g/dL  --  3.8 5.2  --        Results from last 7 days   Lab Units 08/19/23  1628   HSTROP T ng/L 8       Results from last 7 days   Lab Units 08/20/23  0612   CHOLESTEROL mg/dL 117   TRIGLYCERIDES mg/dL 115   HDL CHOL mg/dL 34*   LDL CHOL mg/dL 62             Test Results Pending at Discharge     Pending Labs       Order Current Status    Lupus Anticoag / Cardiolipin Ab In process            Discharge Details        Discharge Medications        New Medications        Instructions Start Date   hydroCHLOROthiazide 12.5 MG tablet  Commonly known as: HYDRODIURIL   12.5 mg, Oral, Daily   Start Date: August 25, 2023            Changes to Medications        Instructions Start Date   atorvastatin 40 MG tablet  Commonly known as: LIPITOR  What changed:   medication strength  how much to take   40 mg, Oral, Nightly      carvedilol 12.5 MG tablet  Commonly known as: COREG  What changed:   medication strength  how much to take   12.5 mg, Oral, 2 Times Daily With Meals             Continue These Medications        Instructions Start Date   amitriptyline 25 MG tablet  Commonly known as: ELAVIL   25 mg, Oral, Nightly PRN      amLODIPine 5 MG tablet  Commonly known as: NORVASC   5 mg, Oral, Daily      Aspirin Low Dose 81 MG EC tablet  Generic drug: aspirin   81 mg, Oral, Daily      clopidogrel 75 MG tablet  Commonly known as: PLAVIX   75 mg, Oral, Daily      hydrALAZINE 100 MG tablet  Commonly known as: APRESOLINE   100 mg, Oral, Every 8 Hours Scheduled      hydrOXYzine 25 MG tablet  Commonly known as: ATARAX   25 mg, Oral, 3 Times Daily PRN      lisinopril 40 MG tablet  Commonly known as:  PRINIVIL,ZESTRIL   40 mg, Oral, Daily      ondansetron 8 MG tablet  Commonly known as: ZOFRAN   8 mg, Oral, Every 8 Hours PRN      pantoprazole 40 MG EC tablet  Commonly known as: PROTONIX   40 mg, Oral, 2 Times Daily Before Meals      spironolactone 25 MG tablet  Commonly known as: ALDACTONE   25 mg, Oral, Daily      sucralfate 1 g tablet  Commonly known as: CARAFATE   1 g, Oral, 4 Times Daily      vilazodone 40 MG tablet tablet  Commonly known as: Viibryd   40 mg, Oral, Daily               No Known Allergies    Discharge Disposition:  Skilled Nursing Facility (DC - External)      Discharge Diet:  Diet Order   Procedures    Diet: Cardiac Diets; Healthy Heart (2-3 Na+); Texture: Regular Texture (IDDSI 7); Fluid Consistency: Thin (IDDSI 0)       Discharge Activity:   Activity Instructions       Activity as Tolerated              CODE STATUS:    Code Status and Medical Interventions:   Ordered at: 08/19/23 1814     Code Status (Patient has no pulse and is not breathing):    CPR (Attempt to Resuscitate)     Medical Interventions (Patient has pulse or is breathing):    Full       Future Appointments   Date Time Provider Department Center   9/8/2023 11:30 AM Wayne Crystal PA-C [facility]   10/20/2023  8:30 AM Mary Kimball DNP, APRN [facility] None   11/27/2023  8:00 AM Rhonda Bustamante APRN [facility]     Additional Instructions for the Follow-ups that You Need to Schedule       Ambulatory Referral to Physical Therapy Vestibular   As directed      Specialty needed: Vestibular   Follow-up needed: Yes        Ambulatory Referral to Sleep Medicine   As directed      Follow-up needed: Yes               Contact information for follow-up providers       Akash Rodriguez Jr., DO .    Specialties: Sports Medicine, Family Medicine, Emergency Medicine  Contact information:  [address]  [place] [id number] [phone]                       Contact information for  after-discharge care       Destination       [facility] .    Service: Skilled Nursing  Contact information:  [address]  [facility] [address] [phone]                                   Additional Instructions for the Follow-ups that You Need to Schedule       Ambulatory Referral to Physical Therapy Vestibular   As directed      Specialty needed: Vestibular   Follow-up needed: Yes        Ambulatory Referral to Sleep Medicine   As directed      Follow-up needed: Yes            Time Spent on Discharge:  Greater than 30 minutes spent on discharge management including final examination, discussion of hospital stay and patient education, preparation of records, medication reconciliation, follow up planning      Sathish Dyer MD  [facility]  08/24/23  12:00 EDT

## 2023-08-24 NOTE — PLAN OF CARE
Goal Outcome Evaluation:  Plan of Care Reviewed With: patient        Progress: improving  Outcome Evaluation: Pt with improving tolerance for activity today - improved safety awareness noted. Does become more impulsive as dizziness increased toward end of session. Less L foot drag noted with improved awareness, but continues with mild ataxia and need for assist. Did trial SBQC which was improved compared to walker - however frequent verbal cues for sequencing and decreased step length for safety. able to ambulate up to 50 this date but frequent standing rest breaks needed. Pt is fatigued with activity. Would recommend SNU vs acute rehab      Anticipated Discharge Disposition (PT): skilled nursing facility, inpatient rehabilitation facility

## 2023-08-24 NOTE — PROGRESS NOTES
[facility] Acute Rehab    Discussed with Dr. Snow. Pt would need to return home alone as no one is able to asist 24/7. He lives in a 2nd floor apt. Dr. Snow is rec SNU    Msg left to inform CCP.   Will sign off    Lo Han RN  Acute Rehab Admission Nurse

## 2023-08-24 NOTE — PROGRESS NOTES
"DOS: 2023  NAME: Flako Coreas   : 1965  PCP: Akash Rodriguez Jr.,   Chief Complaint   Patient presents with    Dizziness       Chief complaint: stroke, ataxia, dizziness  Subjective: Seeing patient for the first time today although he is known to our service from his recent admissions.  This is a 58-year-old man with hypertension, hyperlipidemia, some suspicion for alcohol dependence and depression who has been admitted for left inferior cerebellar stroke.  He reports persistent dizziness and ataxia.  He has been quite concerned about the degree of his symptoms and risk of recurrence.  I was reconsulted to reevaluate him and answer questions.    Objective:  Vital signs: /79 (BP Location: Right arm, Patient Position: Lying)   Pulse 81   Temp 98.6 øF (37 øC) (Oral)   Resp 20   Ht 187.9 cm (73.98\")   Wt 102 kg (225 lb)   SpO2 97%   BMI 28.91 kg/mý    Gen: NAD, vitals reviewed  MS: oriented x3, recent/remote memory intact, normal attention/concentration, language intact, no neglect.  CN: visual acuity grossly normal, PERRL, EOMI without nystagmus, negative head impulse test bilaterally, negative test of skew, no facial droop, no dysarthria  Motor: 5/5 bilateral upper extremities, 4+/5 left lower extremity, 5/5 right lower extremity, normal tone throughout  Sensory: intact to cold temperature and vibration throughout  Gait: Unsteady station, slightly wide-based gait, hesitant to step of the left leg, difficulty with turns especially to the left    Laboratory results:  Lab Results   Component Value Date    GLUCOSE 95 2023    CALCIUM 8.5 (L) 2023     2023    K 3.7 2023    CO2 23.0 2023     2023    BUN 14 2023    CREATININE 1.03 2023    EGFRIFAFRI >60 2018    EGFRIFNONA 98 2018    BCR 13.6 2023    ANIONGAP 12.0 2023     Lab Results   Component Value Date    WBC 7.35 2023    HGB 14.4 2023    " HCT 43.8 08/20/2023    MCV 80.8 08/20/2023     08/20/2023     Lab Results   Component Value Date    LDL 62 08/20/2023     (H) 08/16/2023     (H) 05/11/2022            Review of labs: CBC, BMP unremarkable, LDL 62, initially 119 on first admission    Review and interpretation of imaging: MRI brain first and second admission showed left inferior cerebellar stroke.  Radiology report reviewed.  CTA head and neck showed no flow-limiting stenoses.    Transesophageal echo showed no intracardiac source    Diagnoses:  Stroke, left cerebellar, embolic  Cryptogenic stroke  Essential hypertension  Depressed mood  Mixed hyperlipidemia    Comment: Left inferior cerebellar stroke.  Remains cryptogenic.  Plans are for outpatient sleep evaluation and Zio patch on discharge.  I educated him on his excellent stroke prognosis however we did discuss that his stroke is cryptogenic and that he needs to be alert for recurrent symptoms.    Plan:  1.  DAPT 21 days then aspirin alone.  High-dose statin  2.  Outpatient sleep evaluation  3.  2-week Zio patch ordered for discharge  4.  Follow-up antiphospholipid antibody screen  5.  Remains significantly ataxic.  I think he would benefit from a short stay of acute or subacute rehab for gait retraining    Cleared to DC to rehab at any time from a neurology standpoint.  Will follow-up with us in 3 months    Management discussed with patient and all questions answered.  Happy to see as needed if we can add anything further to his care.

## 2023-08-24 NOTE — THERAPY TREATMENT NOTE
Patient Name: Flako Coreas  : 1965    MRN: [id number]                              Today's Date: 2023       Admit Date: 2023    Visit Dx:     ICD-10-CM ICD-9-CM   1. Ataxia  R27.0 781.3   2. History of cerebellar stroke  Z86.73 V12.54   3. At risk for apnea  Z91.89 V49.89     Patient Active Problem List   Diagnosis    Mixed anxiety depressive disorder    Mixed hyperlipidemia    Essential hypertension    Diverticulosis    Nodule of spleen    Chronic bilateral lower abdominal pain    Lepe's esophagus without dysplasia    GERD (gastroesophageal reflux disease)    History of esophagitis    Dizziness    Acute CVA (cerebrovascular accident)    Hypertensive emergency    Ataxia    CVA (cerebral vascular accident)     Past Medical History:   Diagnosis Date    ADHD (attention deficit hyperactivity disorder)     On going issue    Anxiety     Lepe's esophagus     Benign prostatic hyperplasia Surgery in 2021    Clotting disorder Intestinal    Depression     Diverticulitis     Diverticulosis     Duodenitis     Enteritis     Failure to thrive (child)     Family history of blood clots     GERD (gastroesophageal reflux disease)     Hyperlipidemia     Hypertension     Hypertensive emergency     Low testosterone     Microcytosis     Pancreatitis     Pneumonia     PONV (postoperative nausea and vomiting)     Seasonal affective disorder     Shoulder injury     Stroke     Unexplained weight loss      Past Surgical History:   Procedure Laterality Date    COLON SURGERY      COLONOSCOPY      COLONOSCOPY N/A 2022    Procedure: COLONOSCOPY INTO CECUM WITH HOT SNARE POLYPECTOMY;  Surgeon: Albert Gallo MD;  Location: [facility] ENDOSCOPY;  Service: Gastroenterology;  Laterality: N/A;  PRE- GI BLEED  POST- DIVERTICULOSIS, POLYP    ENDOSCOPY N/A 12/10/2022    Procedure: ESOPHAGOGASTRODUODENOSCOPY;  Surgeon: Albert Gallo MD;  Location: [facility] ENDOSCOPY;  Service: Gastroenterology;  Laterality: N/A;  PRE-  DARK STOOLS  POST- BARRETTS ESOPHAGUS    FRACTURE SURGERY  1980    for broken arm    FRACTURE SURGERY      for broken hand    INCISION AND DRAINAGE ABSCESS  2015    anal    PROSTATE SURGERY      SMALL INTESTINE SURGERY      TONSILLECTOMY        General Information       Row Name 08/24/23 1136          Physical Therapy Time and Intention    Document Type therapy note (daily note)  -ST     Mode of Treatment physical therapy  -       Row Name 08/24/23 1136          General Information    Patient Profile Reviewed yes  -ST     Existing Precautions/Restrictions fall  -ST       Row Name 08/24/23 1136          Cognition    Orientation Status (Cognition) oriented x 4  -ST       [facility] Name 08/24/23 1136          Safety Issues, Functional Mobility    Safety Issues Affecting Function (Mobility) impulsivity  -ST     Impairments Affecting Function (Mobility) balance;endurance/activity tolerance;cognition;motor control;motor planning  -ST     Cognitive Impairments, Mobility Safety/Performance awareness, need for assistance  -ST     Comment, Safety Issues/Impairments (Mobility) gait belt, nonskid socks donned  -ST               User Key  (r) = Recorded By, (t) = Taken By, (c) = Cosigned By      Initials Name Provider Type    ST Brandi Jiang PT Physical Therapist                   Mobility       Row Name 08/24/23 1136          Bed Mobility    Bed Mobility supine-sit;sit-supine  -ST     Supine-Sit [place] (Bed Mobility) standby assist  -ST     Sit-Supine [place] (Bed Mobility) standby assist  -ST     Assistive Device (Bed Mobility) bed rails;head of bed elevated  -[facility] Name 08/24/23 1136          Sit-Stand Transfer    Sit-Stand [place] (Transfers) verbal cues;nonverbal cues (demo/gesture);contact guard  -ST     Assistive Device (Sit-Stand Transfers) walker, front-wheeled;cane, quad  -ST     Comment, (Sit-Stand Transfer) improved hand placement today  -ST       Row Name 08/24/23 1136          Gait/Stairs  (Locomotion)    [place] Level (Gait) verbal cues;contact guard;minimum assist (75% patient effort)  -ST     Assistive Device (Gait) walker, front-wheeled;cane, quad  -ST     Distance in Feet (Gait) 50' - first 25' with walker, second half with HHA. x 25' with SBQC  -ST     Deviations/Abnormal Patterns (Gait) ataxic;base of support, narrow;julia decreased;stride length decreased  -ST     Bilateral Gait Deviations forward flexed posture  -ST     Right Sided Gait Deviations foot drop/toe drag  -ST     Comment, (Gait/Stairs) less L foot drag noted today and improved safety awareness. progressing to CGA but min A at times with LOB - especially when taking too big of steps and turning  -ST               User Key  (r) = Recorded By, (t) = Taken By, (c) = Cosigned By      Initials Name Provider Type    Brandi Roy PT Physical Therapist                   Obj/Interventions       Row Name 08/24/23 1137          Balance    Comment, Balance CGA for dynamic balance with intermittent LOB needing min A to correct. Worked on increased approximation/stance time with forward/retro stepping x 10 R, then L. Pt with tendency to become more impulsive when dizziness worsens. mild ataxia noted  -ST               User Key  (r) = Recorded By, (t) = Taken By, (c) = Cosigned By      Initials Name Provider Type    Brandi Roy PT Physical Therapist                   Goals/Plan    No documentation.                  Clinical Impression       Row Name 08/24/23 1138          Pain    Pretreatment Pain Rating 0/10 - no pain  -ST     Posttreatment Pain Rating 0/10 - no pain  -ST       Row Name 08/24/23 1138          Plan of Care Review    Plan of Care Reviewed With patient  -ST     Progress improving  -ST     Outcome Evaluation Pt with improving tolerance for activity today - improved safety awareness noted. Does become more impulsive as dizziness increased toward end of session. Less L foot drag noted with improved  awareness, but continues with mild ataxia and need for assist. Did trial SBQC which was improved compared to walker - however frequent verbal cues for sequencing and decreased step length for safety. able to ambulate up to 50 this date but frequent standing rest breaks needed. Pt is fatigued with activity. Would recommend SNU vs acute rehab  -ST       Row Name 08/24/23 1138          Therapy Assessment/Plan (PT)    Rehab Potential (PT) fair, will monitor progress closely  -ST     Criteria for Skilled Interventions Met (PT) yes  -ST     Therapy Frequency (PT) 6 times/wk  -ST       Row Name 08/24/23 1138          Positioning and Restraints    Pre-Treatment Position in bed  -ST     Post Treatment Position bed  -ST     In Bed notified nsg;supine;call light within reach;encouraged to call for assist;exit alarm on  -ST               User Key  (r) = Recorded By, (t) = Taken By, (c) = Cosigned By      Initials Name Provider Type    Brandi Roy, PT Physical Therapist                   Outcome Measures       Row Name 08/24/23 1140 08/24/23 0800       How much help from another person do you currently need...    Turning from your back to your side while in flat bed without using bedrails? 4  -ST 4  -JM    Moving from lying on back to sitting on the side of a flat bed without bedrails? 4  -ST 4  -JM    Moving to and from a bed to a chair (including a wheelchair)? 3  -ST 3  -JM    Standing up from a chair using your arms (e.g., wheelchair, bedside chair)? 3  -ST 3  -JM    Climbing 3-5 steps with a railing? 2  -ST 2  -JM    To walk in hospital room? 3  -ST 3  -JM    AM-PAC 6 Clicks Score (PT) 19  -ST 19  -JM    Highest level of mobility 6 --> Walked 10 steps or more  -ST 6 --> Walked 10 steps or more  -JM      Row Name 08/24/23 1140          Functional Assessment    Outcome Measure Options AM-PAC 6 Clicks Basic Mobility (PT)  -ST               User Key  (r) = Recorded By, (t) = Taken By, (c) = Cosigned By      Initials  Name Provider Type    Rachele Saldivar, RN Registered Nurse     Brandi Jiang, PT Physical Therapist                                 Physical Therapy Education       Title: PT OT SLP Therapies (In Progress)       Topic: Physical Therapy (In Progress)       Point: Mobility training (In Progress)       Learning Progress Summary             Patient Acceptance, E,TB, NR by  at 8/24/2023 1140    Acceptance, TB,E, NR by  at 8/23/2023 1213    Acceptance, E,TB, NR by  at 8/22/2023 1613    Acceptance, E,TB,D, DU,NR by  at 8/21/2023 1607    Acceptance, E,TB, VU,NR by  at 8/20/2023 1226                         Point: Home exercise program (In Progress)       Learning Progress Summary             Patient Acceptance, TB,E, NR by  at 8/23/2023 1213    Acceptance, E,TB, NR by  at 8/22/2023 1613    Acceptance, E,TB,D, DU,NR by  at 8/21/2023 1607                         Point: Body mechanics (In Progress)       Learning Progress Summary             Patient Acceptance, E,TB, NR by  at 8/24/2023 1140    Acceptance, TB,E, NR by  at 8/23/2023 1213    Acceptance, E,TB, NR by  at 8/22/2023 1613    Acceptance, E,TB,D, DU,NR by  at 8/21/2023 1607    Acceptance, E,TB, VU,NR by  at 8/20/2023 1226                         Point: Precautions (In Progress)       Learning Progress Summary             Patient Acceptance, E,TB, NR by  at 8/24/2023 1140    Acceptance, TB,E, NR by  at 8/23/2023 1213    Acceptance, E,TB, NR by  at 8/22/2023 1613    Acceptance, E,TB,D, DU,NR by  at 8/21/2023 1607    Acceptance, E,TB, VU,NR by  at 8/20/2023 1226                                         User Key       Initials Effective Dates Name Provider Type Discipline     06/16/21 -  Marva Voss, PT Physical Therapist PT     09/22/22 -  Brandi Jiang PT Physical Therapist PT                  PT Recommendation and Plan     Plan of Care Reviewed With: patient  Progress: improving  Outcome Evaluation: Pt with  improving tolerance for activity today - improved safety awareness noted. Does become more impulsive as dizziness increased toward end of session. Less L foot drag noted with improved awareness, but continues with mild ataxia and need for assist. Did trial SBQC which was improved compared to walker - however frequent verbal cues for sequencing and decreased step length for safety. able to ambulate up to 50 this date but frequent standing rest breaks needed. Pt is fatigued with activity. Would recommend SNU vs acute rehab     Time Calculation:         PT Charges       Row Name 08/24/23 1141             Time Calculation    Start Time 1017  -ST      Stop Time 1041  -ST      Time Calculation (min) 24 min  -ST      PT Received On 08/24/23  -ST      PT - Next Appointment 08/25/23  -ST         Time Calculation- PT    Total Timed Code Minutes- PT 24 minute(s)  -ST         Timed Charges    16936 -  PT Neuromuscular Reeducation Minutes 8  -ST      89703 - Gait Training Minutes  12  -ST      76753 - PT Therapeutic Activity Minutes 4  -ST         Total Minutes    Timed Charges Total Minutes 24  -ST       Total Minutes 24  -ST                User Key  (r) = Recorded By, (t) = Taken By, (c) = Cosigned By      Initials Name Provider Type    ST Brandi Jiang, CARIN Physical Therapist                  Therapy Charges for Today       Code Description Service Date Service Provider Modifiers Qty    91289459273 HC GAIT TRAINING EA 15 MIN 8/23/2023 Brandi Jiang, PT GP 1    39139128828 HC PT NEUROMUSC RE EDUCATION EA 15 MIN 8/24/2023 Brandi Jiang, PT GP 1    15942912629 HC GAIT TRAINING EA 15 MIN 8/24/2023 Brandi Jiang, PT GP 1            PT G-Codes  Outcome Measure Options: AM-PAC 6 Clicks Basic Mobility (PT)  AM-PAC 6 Clicks Score (PT): 19  AM-PAC 6 Clicks Score (OT): 19  Modified [place] Scale: 4 - Moderately severe disability.  Unable to walk without assistance, and unable to attend to own bodily needs without  assistance.  PT Discharge Summary  Anticipated Discharge Disposition (PT): skilled nursing facility, inpatient rehabilitation facility    Brandi Jiang, PT  8/24/2023

## 2023-08-25 LAB
APTT HEX PL PPP: 2 SEC
APTT IMM NP PPP: NORMAL SEC
APTT PPP 1:1 SALINE: NORMAL SEC
APTT PPP: 25.2 SEC
B2 GLYCOPROT1 IGA SER-ACNC: <10 SAU
B2 GLYCOPROT1 IGG SER-ACNC: <10 SGU
B2 GLYCOPROT1 IGM SER-ACNC: <10 SMU
CARDIOLIPIN IGG SER IA-ACNC: <10 GPL
CARDIOLIPIN IGM SER IA-ACNC: <10 MPL
CONFIRM DRVVT: NORMAL SEC
DRVVT SCREEN TO CONFIRM RATIO: NORMAL RATIO
INR PPP: 1 RATIO
LABORATORY COMMENT REPORT: NORMAL
PROTHROMBIN TIME: 10.5 SEC
SCREEN DRVVT: 30.5 SEC
THROMBIN TIME: 15.6 SEC

## 2023-08-28 ENCOUNTER — TELEPHONE (OUTPATIENT)
Dept: NEUROLOGY | Facility: CLINIC | Age: 58
End: 2023-08-28
Payer: COMMERCIAL
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
## 2023-08-29 NOTE — PAYOR COMM NOTE
"Nimesh Rocha (58 y.o. Male)     PLEASE SEE ATTACHED FOR DC NOTICE        THANK YOU  JOSE DE LA TORRE RN/ DEPT  Psychiatric   [phone]  [phone]       Date of Birth   1965    Social Security Number       Address   [address] [place] [address]    Home Phone   [phone]    MRN   [id number]       Holiness   Congregational    Marital Status   Single                            Admission Date   8/19/23    Admission Type   Emergency    Admitting Provider   Xena Cooper MD    Attending Provider       Department, Room/Bed   Psychiatric [address] [place], P596/1       Discharge Date   8/24/2023    Discharge Disposition   Skilled Nursing Facility (DC - External)    Discharge Destination                                 Attending Provider: (none)   Allergies: No Known Allergies    Isolation: None   Infection: None   Code Status: Prior    Ht: 187.9 cm (73.98\")   Wt: 102 kg (225 lb)    Admission Cmt: None   Principal Problem: Ataxia [R27.0]                   Active Insurance as of 8/19/2023       Primary Coverage       Payor Plan Insurance Group Employer/Plan Group    [employer]HE[employer] BLUE CROSS ANTHEM BLUE CROSS BLUE SHIELD PPO [id number]       Payor Plan Address Payor Plan Phone Number Payor Plan Fax Number Effective Dates    [id number] [phone]  1/1/2023 - None Entered    [facility] [address]         Subscriber Name Subscriber Birth Date Member ID       NIMESH ROCHA 1965 [id number]               Secondary Coverage       Payor Plan Insurance Group Employer/Plan Group    [facility] COMMUNITY PLAN [facility] COMMUNITY PLAN [facility] [place]       Payor Plan Address Payor Plan Phone Number Payor Plan Fax Number Effective Dates    [address]   1/1/2021 - None Entered    [facility] [id number]         Subscriber Name Subscriber Birth Date Member ID       NIMESH ROCHA 1965 [id number]                     Emergency Contacts        (Rel.) Home Phone Work " Phone Mobile Phone    Flako Coreas (Son) [phone] -- [phone]              [place]: RUST [id number]  Tax ID [id number]     Discharge Summary        Sathish Dyer MD at 23 1200              Patient Name: Flako Coreas  : 1965  MRN: [id number]    Date of Admission: 2023  Date of Discharge:  2023  Primary Care Physician: Akash Rodriguez Jr.,       Chief Complaint:   Dizziness      Discharge Diagnoses     Active Hospital Problems    Diagnosis  POA    **Ataxia [R27.0]  Yes    CVA (cerebral vascular accident) [I63.9]  Yes    Mixed hyperlipidemia [E78.2]  Yes    Essential hypertension [I10]  Yes      Resolved Hospital Problems   No resolved problems to display.        Brief Admitting HPI     58 year old gentleman who was discharged earlier today after he was diagnosed with a stroke; he also had uncontrolled hypertension; after arriving at home the patient continued to have dizziness, difficulty walking and falls; he has had blurred vision but no double vision; no fever or chills     Hospital Course     Pt admitted for persistent symptoms following recent admission for left cerebellar stroke.  He was again seen in consultation with neurology with MRI showing collection of numerous small foci of acute infarct in the left cerebellar hemisphere in the left PICA distribution.  Initially was thought that these were new infarcts, however they were attributed as more likely due to a more sensitive MRI and did not represent any new acute event.  MEGHANN was obtained which did not show any clear source of embolism.  Per stroke recommendations he will be on DAPT for 21 days, after which time he will continue ASA monotherapy.  P2 Y12 assay showed good response to Plavix and he will remain on this.  Patient will need an outpatient sleep evaluation and will be discharged with a 2-week Zio patch.  He was evaluated by physical therapy who recommend discharge to SNF and he is stable for  discharge home at this time.  Outpatient referral for vestibular rehab was placed and discussed with patient and he is in agreement with treatment plan.    Discharge Plan     Ataxia/vertigo  Secondary to cerebral CVAs  -Referral placed for vestibular rehab     CVA  -Continue dual antiplatelets x21d   -Neurology now s/o; f/u in 3mo   -Zio patch at dc     Hypertension  -spironolactone 50mg, hydralazine 100mg q8h, amlodipine 5mg, lisinopril 40mg     Reported EtOH abuse  -Pt states he only drinks beers during 'football weekends'. Has never had hx of withdrawal or complications, friend at bedside agrees     Day of Discharge     Physical Exam:  Temp:  [98.4 øF (36.9 øC)-98.8 øF (37.1 øC)] 98.8 øF (37.1 øC)  Heart Rate:  [63-91] 70  Resp:  [16-20] 20  BP: (135-155)/() 148/95  Body mass index is 28.91 kg/mý.  Physical Exam  Constitutional:       Appearance: Normal appearance.   Eyes:      Extraocular Movements: Extraocular movements intact.      Conjunctiva/sclera: Conjunctivae normal.      Pupils: Pupils are equal, round, and reactive to light.   Pulmonary:      Effort: Pulmonary effort is normal.   Musculoskeletal:      Right lower leg: No edema.      Left lower leg: No edema.   Skin:     General: Skin is warm.      Coloration: Skin is not jaundiced or pale.   Neurological:      General: No focal deficit present.      Mental Status: He is alert and oriented to person, place, and time.      Cranial Nerves: No cranial nerve deficit.      Comments: LLE 4/5 strength; RLE 5/5   Consultants     Consult Orders (all) (From admission, onward)       Start     Ordered    08/24/23 0717  Inpatient Neurology Consult General  Once,   Status:  Canceled        Specialty:  Neurology  Provider:  Pan Leon MD    08/24/23 0718    08/22/23 1536  Inpatient Case Management  Consult  Once        Provider:  (Not yet assigned)    08/22/23 1535    08/21/23 0955  Inpatient Cardiology Consult  Once         Specialty:  Cardiology  Provider:  Pk Worley Jr., MD    08/21/23 0954    08/19/23 1814  Notify Stroke Coordinator  Once        Provider:  (Not yet assigned)    08/19/23 1814 08/19/23 1814  Inpatient Rehab Admission Consult  Once        Provider:  (Not yet assigned)    08/19/23 1814 08/19/23 1814  Consult to Case Management   Once        Provider:  (Not yet assigned)    08/19/23 1814 08/19/23 1814  Consult to Diabetes Educator  Once,   Status:  Canceled        Provider:  (Not yet assigned)    08/19/23 1814 08/19/23 1814  Inpatient Neurology Consult Stroke  Once        Specialty:  Neurology  Provider:  (Not yet assigned)    08/19/23 1814 08/19/23 1640  Inpatient Neurology Consult Stroke  Once        Specialty:  Neurology  Provider:  Georgina Kraus MD    08/19/23 1639    08/19/23 1640  Inpatient Neurology Consult Stroke  Once        Specialty:  Neurology  Provider:  Georgina Kraus MD    08/19/23 1639                  Procedures     * Surgery not found *      Imaging Results (All)       Procedure Component Value Units Date/Time    CT Head Without Contrast [[id number]] Collected: 08/21/23 0950     Updated: 08/21/23 0957    Narrative:      CT SCAN OF THE HEAD WITHOUT CONTRAST ON 08/21/2023      CLINICAL HISTORY: Nonspecific dizziness.     TECHNIQUE: Spiral CT images were obtained from the base of the skull to  the vertex without intravenous contrast. Images were reformatted and  submitted 3 mm thick axial, sagittal and coronal CT sections with brain  algorithm.     This is correlated to a recent MRI of the brain on 08/16/2023, just 5  days ago, as well as a CT angiogram of the head and neck and CT  perfusion study of the brain that was performed on 08/19/2023, just 2  days ago, and an MRI of the brain yesterday 08/20/2023.      FINDINGS: On the MRI of the brain on 08/16/2023, there were 2 separate  tiny 5 mm acute infarcts in the left cerebellum, one in the inferior  medial  left cerebellum and one in the inferior left cerebellar white  matter in the left PICA territory, and on subsequent MRI of the brain 4  days later on 08/20/2023, there were multiple tiny acute infarcts in the  left cerebellum with at least 9 separate tiny 3 to 6 mm acute infarcts  involving the mid and inferior medial left cerebellum, all in the left  PICA territory. Unfortunately on this head CT, these infarcts are too  small to appreciate. Clearly they were present on prior MRIs. There is  minimal low-density in the periventricular white matter consistent with  minimal small vessel disease. The remainder of the brain parenchyma is  normal in attenuation. The ventricles are normal in size. I see no mass  effect. There is no midline shift. No extra-axial fluid collections are  identified. There is no evidence of acute intracranial hemorrhage. The  calvarium and the skull base are normal in appearance. The paranasal  sinuses and the mastoid air cells and middle ear cavities are clear.       Impression:         1. Unfortunately, the 9 tiny 3 to 6 mm acute infarcts in the mid to  inferior medial left cerebellum within the left PICA territory seen on  yesterday's MRI of the brain 08/20/2023,  are too small to appreciate on  the current head CT, but they were clearly present on yesterday's MRI of  the brain. Other than minimal small vessel disease in the cerebral white  matter, th remainder of the head CT is within normal limits.     Radiation dose reduction techniques were utilized, including automated  exposure control and exposure modulation based on body size.        This report was finalized on 8/21/2023 9:54 AM by Dr. Miguel Turner M.D.       MRI Brain Without Contrast [[id number]] Collected: 08/20/23 1615     Updated: 08/20/23 1621    Narrative:      MRI OF THE BRAIN WITHOUT CONTRAST     CLINICAL HISTORY: Slurred speech, dizziness, gait issues, nausea and  vomiting.     TECHNIQUE: MRI of the brain was obtained with  sagittal T1, axial T1,  axial FLAIR, axial T2, axial diffusion, and axial susceptibility  weighted images.     COMPARISON:  Comparison is made to previous MRI of the brain dated  08/16/2023.     FINDINGS:     There are multiple small foci of acute infarction identified within the  inferior aspect of the left cerebellar hemisphere that are within the  left PICA distribution. The largest of these infarcts measures up to  approximately 9 mm in diameter. Some of these infarcts are newly  apparent when compared to the prior study dated 08/16/2023 although the  current examination was performed on a 3 Rosa MRI unit as compared to  the 1.5 Rosa MRI unit on the prior examination. As such, the current  implemented diffusion weighted sequence is more sensitive. Therefore, it  is difficult to assess whether these are truly new foci of infarction.     Otherwise, the ventricles, sulci, and cisterns are age-appropriate.  There are mild changes of chronic small vessel ischemic phenomenon. Old  lacunar disease is seen within the right thalamus. The midline  intracranial anatomy is unremarkable. Incidental note is made of a  chronic microhemorrhage within the corticomedullary interface of the  left superior frontal gyrus which is probably at the site of underlying  amyloid. The major intracranial flow related signal voids are  unremarkable.     There is mild mucosal thickening appreciated within the maxillary  sinuses and the ethmoid air cells.     There is a small amount of fluid signal intensity within the left  mastoid air cells that is statistically likely representative of an  incidental mastoid effusion.       Impression:         There is a small collection of numerous small foci of acute infarction  within the inferior and medial aspect of the left cerebellar hemisphere  within the left PICA distribution. A larger number of infarcts are  appreciated on the current exam although it is difficult to assess  whether these are  truly new foci of acute infarction as the current  study was performed on a more sensitive 3 Rosa MRI unit as compared to  the previously implemented 1.5 Rosa MRI machine. As such, the  diffusion-weighted imaging sequence is more sensitive on the current  examination.     These findings were discussed with Dr. Kraus on 08/20/2023 at  approximately 3:54 PM.         This report was finalized on 8/20/2023 4:18 PM by Dr. Aniceto Deleon M.D.       XR Chest 1 View [[id number]] Collected: 08/19/23 1831     Updated: 08/19/23 1905    Narrative:      STAT PORTABLE RADIOGRAPHIC VIEW OF THE CHEST     CLINICAL HISTORY: Acute stroke protocol.     FINDINGS:     The lungs are clear of acute infiltrates. The cardiomediastinal  silhouette is within normal limits. The osseous structures are  unremarkable.       Impression:         No active disease in the chest.     This report was finalized on 8/19/2023 7:02 PM by Dr. Aniceto Deleon M.D.       CT Angiogram Head w AI Analysis of LVO [[id number]] Collected: 08/19/23 1829     Updated: 08/19/23 1834    Narrative:      CT ANGIOGRAM OF THE HEAD AND NECK AND CT PERFUSION STUDY     CLINICAL HISTORY: Difficulty standing up. Recent cerebellar infarct.     TECHNIQUE: A noncontrast head CT was performed with 3 mm axial images.  Thereafter, a CT perfusion study was performed after the dynamic bolus  of IV contrast. Standard perfusion maps were constructed with RAPID  software. A CT angiogram of the head and neck was then performed with 1  mm axial images. Sagittal, coronal, and 3-dimensional reconstructed  images were obtained. Finally, a delayed postcontrast head CT was  performed with 3 mm axial images. AI analysis of LVO was utilized.     COMPARISON: CT angiogram of the head and neck dated 08/16/2023 and CT  head dated 08/18/2023.     FINDINGS:     NONCONTRAST HEAD CT: On the previous MRI of the brain dated 08/16/2023,  there were 2 small foci of acute infarction noted within the  inferior  aspect of the left cerebellar hemisphere within the left PICA  distribution. These are not well delineated on this CT examination.  There is no convincing new area of acute infarction on the head CT.     Incidental note is made of partial opacification of the left mastoid air  cells which was also seen on the prior head CT dated 08/18/2023.     CT PERFUSION STUDY: There is no abnormality in the CBF less than 30% or  the time to maximal enhancement greater than 6 second maps. However,  there are some areas of diminished perfusion within the left occipital  lobe on the time to maximum enhancement greater than 4 second maps and  measures up to 13 cc.     CTA NECK: There is a classic configuration of the aortic arch. There is  no significant great vessel origin stenosis. A mild degree of stenosis  is appreciated at the origin of the dominant right vertebral artery.  Atherosclerotic changes are identified within the carotid bifurcations  and proximal internal carotids. However, strictly speaking, there is no  significant NASCET stenosis. Again, no significant interval change is  seen in the CT angiogram of the neck when compared to the prior study  dated 08/16/2023.     CTA HEAD: There is mild to moderate atherosclerotic irregularity and  narrowing noted within the V4 segment of the right vertebral artery as  well as the basilar artery. The basilar artery is unremarkable in  appearance. The posterior cerebral arteries are unremarkable.  Atherosclerotic calcifications are appreciated within the carotid  siphons resulting in only mild degrees of luminal compromise. The middle  and anterior cerebral arteries are unremarkable in appearance. When  compared to the prior CT angiogram of the head, there is no significant  interval change     DELAYED POSTCONTRAST HEAD CT: No abnormal foci of contrast enhancement  are identified within the brain parenchyma.       Impression:      There is no abnormality on the CBF less  than 30% or the time  to maximum enhancement greater than 6 seconds perfusion maps. However,  there are some areas of diminished perfusion within the left occipital  lobe on the time to maximum enhancement greater than 4 second maps  measuring up to 13 cc that are within the left PCA distribution. The  findings are compatible with an area of low level diminished perfusion  within the left occipital lobe. No prior perfusion study was performed  and a new small acute infarct within the left occipital lobe could not  be excluded. Further evaluation could be performed with an MRI of the  brain for much more sensitive and specific area of acute infarction.     There is no significant interval change in the CT angiogram of the head  and neck when compared to the prior study dated 08/16/2023. There is  mild to moderate atherosclerotic irregularity and narrowing of the V4  segment of the right vertebral artery as well as the basilar artery.  There is a moderate degree of stenosis within the origin of the right  vertebral artery. Atherosclerotic changes are identified within the  internal carotid arteries. However, there is no significant NASCET  stenosis.     These findings were contemporaneously discussed with Dr. Kraus.      Radiation dose reduction techniques were utilized, including automated  exposure control and exposure modulation based on body size.        This report was finalized on 8/19/2023 6:31 PM by Dr. Aniceto Deleon M.D.       CT Angiogram Neck [[id number]] Collected: 08/19/23 1829     Updated: 08/19/23 1834    Narrative:      CT ANGIOGRAM OF THE HEAD AND NECK AND CT PERFUSION STUDY     CLINICAL HISTORY: Difficulty standing up. Recent cerebellar infarct.     TECHNIQUE: A noncontrast head CT was performed with 3 mm axial images.  Thereafter, a CT perfusion study was performed after the dynamic bolus  of IV contrast. Standard perfusion maps were constructed with RAPID  software. A CT angiogram of the head and  neck was then performed with 1  mm axial images. Sagittal, coronal, and 3-dimensional reconstructed  images were obtained. Finally, a delayed postcontrast head CT was  performed with 3 mm axial images. AI analysis of LVO was utilized.     COMPARISON: CT angiogram of the head and neck dated 08/16/2023 and CT  head dated 08/18/2023.     FINDINGS:     NONCONTRAST HEAD CT: On the previous MRI of the brain dated 08/16/2023,  there were 2 small foci of acute infarction noted within the inferior  aspect of the left cerebellar hemisphere within the left PICA  distribution. These are not well delineated on this CT examination.  There is no convincing new area of acute infarction on the head CT.     Incidental note is made of partial opacification of the left mastoid air  cells which was also seen on the prior head CT dated 08/18/2023.     CT PERFUSION STUDY: There is no abnormality in the CBF less than 30% or  the time to maximal enhancement greater than 6 second maps. However,  there are some areas of diminished perfusion within the left occipital  lobe on the time to maximum enhancement greater than 4 second maps and  measures up to 13 cc.     CTA NECK: There is a classic configuration of the aortic arch. There is  no significant great vessel origin stenosis. A mild degree of stenosis  is appreciated at the origin of the dominant right vertebral artery.  Atherosclerotic changes are identified within the carotid bifurcations  and proximal internal carotids. However, strictly speaking, there is no  significant NASCET stenosis. Again, no significant interval change is  seen in the CT angiogram of the neck when compared to the prior study  dated 08/16/2023.     CTA HEAD: There is mild to moderate atherosclerotic irregularity and  narrowing noted within the V4 segment of the right vertebral artery as  well as the basilar artery. The basilar artery is unremarkable in  appearance. The posterior cerebral arteries are  unremarkable.  Atherosclerotic calcifications are appreciated within the carotid  siphons resulting in only mild degrees of luminal compromise. The middle  and anterior cerebral arteries are unremarkable in appearance. When  compared to the prior CT angiogram of the head, there is no significant  interval change     DELAYED POSTCONTRAST HEAD CT: No abnormal foci of contrast enhancement  are identified within the brain parenchyma.       Impression:      There is no abnormality on the CBF less than 30% or the time  to maximum enhancement greater than 6 seconds perfusion maps. However,  there are some areas of diminished perfusion within the left occipital  lobe on the time to maximum enhancement greater than 4 second maps  measuring up to 13 cc that are within the left PCA distribution. The  findings are compatible with an area of low level diminished perfusion  within the left occipital lobe. No prior perfusion study was performed  and a new small acute infarct within the left occipital lobe could not  be excluded. Further evaluation could be performed with an MRI of the  brain for much more sensitive and specific area of acute infarction.     There is no significant interval change in the CT angiogram of the head  and neck when compared to the prior study dated 08/16/2023. There is  mild to moderate atherosclerotic irregularity and narrowing of the V4  segment of the right vertebral artery as well as the basilar artery.  There is a moderate degree of stenosis within the origin of the right  vertebral artery. Atherosclerotic changes are identified within the  internal carotid arteries. However, there is no significant NASCET  stenosis.     These findings were contemporaneously discussed with Dr. Kraus.      Radiation dose reduction techniques were utilized, including automated  exposure control and exposure modulation based on body size.        This report was finalized on 8/19/2023 6:31 PM by Dr. Aniceto Deleon,  M.D.       CT CEREBRAL PERFUSION WITH & WITHOUT CONTRAST [[id number]] Collected: 08/19/23 1829     Updated: 08/19/23 1834    Narrative:      CT ANGIOGRAM OF THE HEAD AND NECK AND CT PERFUSION STUDY     CLINICAL HISTORY: Difficulty standing up. Recent cerebellar infarct.     TECHNIQUE: A noncontrast head CT was performed with 3 mm axial images.  Thereafter, a CT perfusion study was performed after the dynamic bolus  of IV contrast. Standard perfusion maps were constructed with RAPID  software. A CT angiogram of the head and neck was then performed with 1  mm axial images. Sagittal, coronal, and 3-dimensional reconstructed  images were obtained. Finally, a delayed postcontrast head CT was  performed with 3 mm axial images. AI analysis of LVO was utilized.     COMPARISON: CT angiogram of the head and neck dated 08/16/2023 and CT  head dated 08/18/2023.     FINDINGS:     NONCONTRAST HEAD CT: On the previous MRI of the brain dated 08/16/2023,  there were 2 small foci of acute infarction noted within the inferior  aspect of the left cerebellar hemisphere within the left PICA  distribution. These are not well delineated on this CT examination.  There is no convincing new area of acute infarction on the head CT.     Incidental note is made of partial opacification of the left mastoid air  cells which was also seen on the prior head CT dated 08/18/2023.     CT PERFUSION STUDY: There is no abnormality in the CBF less than 30% or  the time to maximal enhancement greater than 6 second maps. However,  there are some areas of diminished perfusion within the left occipital  lobe on the time to maximum enhancement greater than 4 second maps and  measures up to 13 cc.     CTA NECK: There is a classic configuration of the aortic arch. There is  no significant great vessel origin stenosis. A mild degree of stenosis  is appreciated at the origin of the dominant right vertebral artery.  Atherosclerotic changes are identified within the  carotid bifurcations  and proximal internal carotids. However, strictly speaking, there is no  significant NASCET stenosis. Again, no significant interval change is  seen in the CT angiogram of the neck when compared to the prior study  dated 08/16/2023.     CTA HEAD: There is mild to moderate atherosclerotic irregularity and  narrowing noted within the V4 segment of the right vertebral artery as  well as the basilar artery. The basilar artery is unremarkable in  appearance. The posterior cerebral arteries are unremarkable.  Atherosclerotic calcifications are appreciated within the carotid  siphons resulting in only mild degrees of luminal compromise. The middle  and anterior cerebral arteries are unremarkable in appearance. When  compared to the prior CT angiogram of the head, there is no significant  interval change     DELAYED POSTCONTRAST HEAD CT: No abnormal foci of contrast enhancement  are identified within the brain parenchyma.       Impression:      There is no abnormality on the CBF less than 30% or the time  to maximum enhancement greater than 6 seconds perfusion maps. However,  there are some areas of diminished perfusion within the left occipital  lobe on the time to maximum enhancement greater than 4 second maps  measuring up to 13 cc that are within the left PCA distribution. The  findings are compatible with an area of low level diminished perfusion  within the left occipital lobe. No prior perfusion study was performed  and a new small acute infarct within the left occipital lobe could not  be excluded. Further evaluation could be performed with an MRI of the  brain for much more sensitive and specific area of acute infarction.     There is no significant interval change in the CT angiogram of the head  and neck when compared to the prior study dated 08/16/2023. There is  mild to moderate atherosclerotic irregularity and narrowing of the V4  segment of the right vertebral artery as well as the  basilar artery.  There is a moderate degree of stenosis within the origin of the right  vertebral artery. Atherosclerotic changes are identified within the  internal carotid arteries. However, there is no significant NASCET  stenosis.     These findings were contemporaneously discussed with Dr. Kraus.      Radiation dose reduction techniques were utilized, including automated  exposure control and exposure modulation based on body size.        This report was finalized on 8/19/2023 6:31 PM by Dr. Aniceto Deleon M.D.             Results for orders placed during the hospital encounter of 12/08/22    Duplex Renal Artery - Bilateral Complete CAR    Interpretation Summary    Normal right renal artery.    Normal left renal artery.    Results for orders placed during the hospital encounter of 08/19/23    Adult Transesophageal Echo (MEGHANN) W/ Cont if Necessary Per Protocol    Interpretation Summary    Left ventricular systolic function is hyperdynamic (EF > 70%).    Left ventricular wall thickness is consistent with mild to moderate concentric hypertrophy.    Left ventricular diastolic function is consistent with (grade I) impaired relaxation.    Normal right ventricular cavity size and systolic function noted.    The left atrial cavity is mildly dilated.    No evidence of a left atrial appendage thrombus was present    No evidence of a patent foramen ovale. Saline test results are negative.    There are very mild plaques in the distal descending thoracic aorta. There are no plaques in the aortic arch or ascending aorta    There is no evidence of pericardial effusion    Pertinent Labs     Results from last 7 days   Lab Units 08/20/23  0612 08/19/23  1628 08/18/23  0531   WBC 10*3/mm3 7.35 10.04 6.95   HEMOGLOBIN g/dL 14.4 16.4 14.3   PLATELETS 10*3/mm3 221 300 221     Results from last 7 days   Lab Units 08/23/23  0539 08/20/23  0612 08/19/23  1654 08/19/23  1628 08/18/23  0531   SODIUM mmol/L 141 141  --  144 142   POTASSIUM  mmol/L 3.7 4.0  --  4.1 3.8   CHLORIDE mmol/L 106 109*  --  105 106   CO2 mmol/L 23.0 22.1  --  25.0 22.5   BUN mg/dL 14 20  --  22* 16   CREATININE mg/dL 1.03 1.08 1.20 1.22 1.00   GLUCOSE mg/dL 95 94  --  142* 110*   EGFR mL/min/1.73 85.3 80.5  --  69.6 88.3     Results from last 7 days   Lab Units 08/20/23  0612 08/19/23  1628   ALBUMIN g/dL 3.8 5.2   BILIRUBIN mg/dL 0.4 0.4   ALK PHOS U/L 62 91   AST (SGOT) U/L 9 18   ALT (SGPT) U/L 17 24     Results from last 7 days   Lab Units 08/23/23  0539 08/20/23  0612 08/19/23  1628 08/18/23  0531   CALCIUM mg/dL 8.5* 8.8 10.3 9.2   ALBUMIN g/dL  --  3.8 5.2  --        Results from last 7 days   Lab Units 08/19/23  1628   HSTROP T ng/L 8       Results from last 7 days   Lab Units 08/20/23  0612   CHOLESTEROL mg/dL 117   TRIGLYCERIDES mg/dL 115   HDL CHOL mg/dL 34*   LDL CHOL mg/dL 62             Test Results Pending at Discharge     Pending Labs       Order Current Status    Lupus Anticoag / Cardiolipin Ab In process            Discharge Details        Discharge Medications        New Medications        Instructions Start Date   hydroCHLOROthiazide 12.5 MG tablet  Commonly known as: HYDRODIURIL   12.5 mg, Oral, Daily   Start Date: August 25, 2023            Changes to Medications        Instructions Start Date   atorvastatin 40 MG tablet  Commonly known as: LIPITOR  What changed:   medication strength  how much to take   40 mg, Oral, Nightly      carvedilol 12.5 MG tablet  Commonly known as: COREG  What changed:   medication strength  how much to take   12.5 mg, Oral, 2 Times Daily With Meals             Continue These Medications        Instructions Start Date   amitriptyline 25 MG tablet  Commonly known as: ELAVIL   25 mg, Oral, Nightly PRN      amLODIPine 5 MG tablet  Commonly known as: NORVASC   5 mg, Oral, Daily      Aspirin Low Dose 81 MG EC tablet  Generic drug: aspirin   81 mg, Oral, Daily      clopidogrel 75 MG tablet  Commonly known as: PLAVIX   75 mg, Oral,  Daily      hydrALAZINE 100 MG tablet  Commonly known as: APRESOLINE   100 mg, Oral, Every 8 Hours Scheduled      hydrOXYzine 25 MG tablet  Commonly known as: ATARAX   25 mg, Oral, 3 Times Daily PRN      lisinopril 40 MG tablet  Commonly known as: PRINIVIL,ZESTRIL   40 mg, Oral, Daily      ondansetron 8 MG tablet  Commonly known as: ZOFRAN   8 mg, Oral, Every 8 Hours PRN      pantoprazole 40 MG EC tablet  Commonly known as: PROTONIX   40 mg, Oral, 2 Times Daily Before Meals      spironolactone 25 MG tablet  Commonly known as: ALDACTONE   25 mg, Oral, Daily      sucralfate 1 g tablet  Commonly known as: CARAFATE   1 g, Oral, 4 Times Daily      vilazodone 40 MG tablet tablet  Commonly known as: Viibryd   40 mg, Oral, Daily               No Known Allergies    Discharge Disposition:  Skilled Nursing Facility (DC - External)      Discharge Diet:  Diet Order   Procedures    Diet: Cardiac Diets; Healthy Heart (2-3 Na+); Texture: Regular Texture (IDDSI 7); Fluid Consistency: Thin (IDDSI 0)       Discharge Activity:   Activity Instructions       Activity as Tolerated              CODE STATUS:    Code Status and Medical Interventions:   Ordered at: 08/19/23 1814     Code Status (Patient has no pulse and is not breathing):    CPR (Attempt to Resuscitate)     Medical Interventions (Patient has pulse or is breathing):    Full       Future Appointments   Date Time Provider Department Center   9/8/2023 11:30 AM Wayne Crystal PA-C [facility]   10/20/2023  8:30 AM Mary Kimball DNP, JANEEN [facility] None   11/27/2023  8:00 AM Rhonda Bustamante APRN [facility]     Additional Instructions for the Follow-ups that You Need to Schedule       Ambulatory Referral to Physical Therapy Vestibular   As directed      Specialty needed: Vestibular   Follow-up needed: Yes        Ambulatory Referral to Sleep Medicine   As directed      Follow-up needed: Yes               Contact information for follow-up providers        Akash Rodriguez Jr., DO .    Specialties: Sports Medicine, Family Medicine, Emergency Medicine  Contact information:  [address]  [address]  [facility] [address] [phone]                       Contact information for after-discharge care       Destination       [facility] .    Service: Skilled Nursing  Contact information:  [address]  [facility] [address] [phone]                                   Additional Instructions for the Follow-ups that You Need to Schedule       Ambulatory Referral to Physical Therapy Vestibular   As directed      Specialty needed: Vestibular   Follow-up needed: Yes        Ambulatory Referral to Sleep Medicine   As directed      Follow-up needed: Yes            Time Spent on Discharge:  Greater than 30 minutes spent on discharge management including final examination, discussion of hospital stay and patient education, preparation of records, medication reconciliation, follow up planning      Sathish Dyer MD  [facility]  08/24/23  12:00 EDT                Electronically signed by Sathish Dyer MD at 08/24/23 9143

## 2023-09-01 DIAGNOSIS — F41.8 MIXED ANXIETY DEPRESSIVE DISORDER: ICD-10-CM

## 2023-09-01 RX ORDER — VILAZODONE HYDROCHLORIDE 40 MG/1
40 TABLET ORAL DAILY
Qty: 90 TABLET | Refills: 1 | Status: SHIPPED | OUTPATIENT
Start: 2023-09-01

## 2023-09-01 NOTE — TELEPHONE ENCOUNTER
Caller: HEMA    Relationship to patient: [facility]     Best call back number: [phone]    Patient is needing: NEEDS A PRE AUTH FOR VILAZODONE 40MG SUBMITTED TO THEM. PATIENT WOULD LIKE A CALL BACK WHEN THIS IS DONE    PH# [phone]

## 2023-09-01 NOTE — TELEPHONE ENCOUNTER
I spoke with pt and he voices understanding and agrees.  Also pt reminded of f/u appt 11/27/23 with Dianna.

## 2023-09-02 ENCOUNTER — APPOINTMENT (OUTPATIENT)
Dept: MRI IMAGING | Facility: HOSPITAL | Age: 58
End: 2023-09-02
Payer: COMMERCIAL

## 2023-09-02 ENCOUNTER — HOSPITAL ENCOUNTER (OUTPATIENT)
Facility: HOSPITAL | Age: 58
Setting detail: OBSERVATION
Discharge: HOME | End: 2023-09-04
Attending: EMERGENCY MEDICINE | Admitting: EMERGENCY MEDICINE
Payer: COMMERCIAL

## 2023-09-02 DIAGNOSIS — I10 UNCONTROLLED HYPERTENSION: ICD-10-CM

## 2023-09-02 DIAGNOSIS — R42 DIZZINESS: Primary | ICD-10-CM

## 2023-09-02 LAB
ALBUMIN SERPL-MCNC: 4.8 G/DL (ref 3.5–5.2)
ALBUMIN/GLOB SERPL: 1.7 G/DL
ALP SERPL-CCNC: 71 U/L (ref 39–117)
ALT SERPL W P-5'-P-CCNC: 27 U/L (ref 1–41)
ANION GAP SERPL CALCULATED.3IONS-SCNC: 15 MMOL/L (ref 5–15)
AST SERPL-CCNC: 18 U/L (ref 1–40)
BASOPHILS # BLD AUTO: 0.04 10*3/MM3 (ref 0–0.2)
BASOPHILS NFR BLD AUTO: 0.4 % (ref 0–1.5)
BILIRUB SERPL-MCNC: 0.4 MG/DL (ref 0–1.2)
BUN SERPL-MCNC: 19 MG/DL (ref 6–20)
BUN/CREAT SERPL: 21.3 (ref 7–25)
CALCIUM SPEC-SCNC: 9.9 MG/DL (ref 8.6–10.5)
CHLORIDE SERPL-SCNC: 105 MMOL/L (ref 98–107)
CO2 SERPL-SCNC: 23 MMOL/L (ref 22–29)
CREAT SERPL-MCNC: 0.89 MG/DL (ref 0.76–1.27)
DEPRECATED RDW RBC AUTO: 43.7 FL (ref 37–54)
EGFRCR SERPLBLD CKD-EPI 2021: 100.6 ML/MIN/1.73
EOSINOPHIL # BLD AUTO: 0.53 10*3/MM3 (ref 0–0.4)
EOSINOPHIL NFR BLD AUTO: 5.3 % (ref 0.3–6.2)
ERYTHROCYTE [DISTWIDTH] IN BLOOD BY AUTOMATED COUNT: 15 % (ref 12.3–15.4)
GLOBULIN UR ELPH-MCNC: 2.8 GM/DL
GLUCOSE SERPL-MCNC: 112 MG/DL (ref 65–99)
HCT VFR BLD AUTO: 45.9 % (ref 37.5–51)
HGB BLD-MCNC: 15.3 G/DL (ref 13–17.7)
IMM GRANULOCYTES # BLD AUTO: 0.02 10*3/MM3 (ref 0–0.05)
IMM GRANULOCYTES NFR BLD AUTO: 0.2 % (ref 0–0.5)
LYMPHOCYTES # BLD AUTO: 1.3 10*3/MM3 (ref 0.7–3.1)
LYMPHOCYTES NFR BLD AUTO: 12.9 % (ref 19.6–45.3)
MCH RBC QN AUTO: 26.9 PG (ref 26.6–33)
MCHC RBC AUTO-ENTMCNC: 33.3 G/DL (ref 31.5–35.7)
MCV RBC AUTO: 80.8 FL (ref 79–97)
MONOCYTES # BLD AUTO: 0.61 10*3/MM3 (ref 0.1–0.9)
MONOCYTES NFR BLD AUTO: 6.1 % (ref 5–12)
NEUTROPHILS NFR BLD AUTO: 7.54 10*3/MM3 (ref 1.7–7)
NEUTROPHILS NFR BLD AUTO: 75.1 % (ref 42.7–76)
NRBC BLD AUTO-RTO: 0 /100 WBC (ref 0–0.2)
PLATELET # BLD AUTO: 344 10*3/MM3 (ref 140–450)
PMV BLD AUTO: 10.3 FL (ref 6–12)
POTASSIUM SERPL-SCNC: 4.2 MMOL/L (ref 3.5–5.2)
PROT SERPL-MCNC: 7.6 G/DL (ref 6–8.5)
QT INTERVAL: 428 MS
QTC INTERVAL: 439 MS
RBC # BLD AUTO: 5.68 10*6/MM3 (ref 4.14–5.8)
SODIUM SERPL-SCNC: 143 MMOL/L (ref 136–145)
WBC NRBC COR # BLD: 10.04 10*3/MM3 (ref 3.4–10.8)

## 2023-09-02 PROCEDURE — 93010 ELECTROCARDIOGRAM REPORT: CPT | Performed by: INTERNAL MEDICINE

## 2023-09-02 PROCEDURE — 36415 COLL VENOUS BLD VENIPUNCTURE: CPT

## 2023-09-02 PROCEDURE — 70551 MRI BRAIN STEM W/O DYE: CPT

## 2023-09-02 PROCEDURE — 80053 COMPREHEN METABOLIC PANEL: CPT | Performed by: PHYSICIAN ASSISTANT

## 2023-09-02 PROCEDURE — 99284 EMERGENCY DEPT VISIT MOD MDM: CPT

## 2023-09-02 PROCEDURE — 99223 1ST HOSP IP/OBS HIGH 75: CPT | Performed by: PSYCHIATRY & NEUROLOGY

## 2023-09-02 PROCEDURE — 96374 THER/PROPH/DIAG INJ IV PUSH: CPT

## 2023-09-02 PROCEDURE — G0378 HOSPITAL OBSERVATION PER HR: HCPCS

## 2023-09-02 PROCEDURE — 85025 COMPLETE CBC W/AUTO DIFF WBC: CPT | Performed by: PHYSICIAN ASSISTANT

## 2023-09-02 PROCEDURE — 25010000002 LORAZEPAM PER 2 MG: Performed by: EMERGENCY MEDICINE

## 2023-09-02 PROCEDURE — 93005 ELECTROCARDIOGRAM TRACING: CPT | Performed by: PHYSICIAN ASSISTANT

## 2023-09-02 RX ORDER — ASPIRIN 81 MG/1
81 TABLET ORAL DAILY
Status: DISCONTINUED | OUTPATIENT
Start: 2023-09-03 | End: 2023-09-04 | Stop reason: HOSPADM

## 2023-09-02 RX ORDER — SPIRONOLACTONE 25 MG/1
25 TABLET ORAL DAILY
Status: DISCONTINUED | OUTPATIENT
Start: 2023-09-03 | End: 2023-09-04 | Stop reason: HOSPADM

## 2023-09-02 RX ORDER — AMLODIPINE BESYLATE 10 MG/1
5 TABLET ORAL DAILY
COMMUNITY

## 2023-09-02 RX ORDER — SODIUM CHLORIDE 0.9 % (FLUSH) 0.9 %
10 SYRINGE (ML) INJECTION AS NEEDED
Status: DISCONTINUED | OUTPATIENT
Start: 2023-09-02 | End: 2023-09-04 | Stop reason: HOSPADM

## 2023-09-02 RX ORDER — HYDROXYZINE HYDROCHLORIDE 25 MG/1
25 TABLET, FILM COATED ORAL 3 TIMES DAILY PRN
Status: DISCONTINUED | OUTPATIENT
Start: 2023-09-02 | End: 2023-09-04

## 2023-09-02 RX ORDER — ONDANSETRON 2 MG/ML
4 INJECTION INTRAMUSCULAR; INTRAVENOUS EVERY 6 HOURS PRN
Status: DISCONTINUED | OUTPATIENT
Start: 2023-09-02 | End: 2023-09-04 | Stop reason: HOSPADM

## 2023-09-02 RX ORDER — HYDROCHLOROTHIAZIDE 12.5 MG/1
12.5 TABLET ORAL DAILY
Status: DISCONTINUED | OUTPATIENT
Start: 2023-09-03 | End: 2023-09-04 | Stop reason: HOSPADM

## 2023-09-02 RX ORDER — SODIUM CHLORIDE 9 MG/ML
40 INJECTION, SOLUTION INTRAVENOUS AS NEEDED
Status: DISCONTINUED | OUTPATIENT
Start: 2023-09-02 | End: 2023-09-04 | Stop reason: HOSPADM

## 2023-09-02 RX ORDER — LISINOPRIL 10 MG/1
40 TABLET ORAL DAILY
Status: ON HOLD | COMMUNITY
End: 2023-09-02

## 2023-09-02 RX ORDER — ONDANSETRON 4 MG/1
4 TABLET, FILM COATED ORAL EVERY 6 HOURS PRN
Status: DISCONTINUED | OUTPATIENT
Start: 2023-09-02 | End: 2023-09-04 | Stop reason: HOSPADM

## 2023-09-02 RX ORDER — LISINOPRIL 20 MG/1
40 TABLET ORAL DAILY
Status: DISCONTINUED | OUTPATIENT
Start: 2023-09-03 | End: 2023-09-04 | Stop reason: HOSPADM

## 2023-09-02 RX ORDER — ATORVASTATIN CALCIUM 20 MG/1
40 TABLET, FILM COATED ORAL NIGHTLY
Status: DISCONTINUED | OUTPATIENT
Start: 2023-09-02 | End: 2023-09-04 | Stop reason: HOSPADM

## 2023-09-02 RX ORDER — HYDRALAZINE HYDROCHLORIDE 50 MG/1
100 TABLET, FILM COATED ORAL EVERY 8 HOURS SCHEDULED
Status: DISCONTINUED | OUTPATIENT
Start: 2023-09-02 | End: 2023-09-04 | Stop reason: HOSPADM

## 2023-09-02 RX ORDER — SODIUM CHLORIDE 0.9 % (FLUSH) 0.9 %
10 SYRINGE (ML) INJECTION EVERY 12 HOURS SCHEDULED
Status: DISCONTINUED | OUTPATIENT
Start: 2023-09-02 | End: 2023-09-04 | Stop reason: HOSPADM

## 2023-09-02 RX ORDER — ACETAMINOPHEN 325 MG/1
650 TABLET ORAL EVERY 4 HOURS PRN
Status: DISCONTINUED | OUTPATIENT
Start: 2023-09-02 | End: 2023-09-04 | Stop reason: HOSPADM

## 2023-09-02 RX ORDER — AMLODIPINE BESYLATE 10 MG/1
10 TABLET ORAL NIGHTLY
Status: DISCONTINUED | OUTPATIENT
Start: 2023-09-02 | End: 2023-09-04 | Stop reason: HOSPADM

## 2023-09-02 RX ORDER — MECLIZINE HYDROCHLORIDE 25 MG/1
50 TABLET ORAL ONCE
Status: COMPLETED | OUTPATIENT
Start: 2023-09-02 | End: 2023-09-02

## 2023-09-02 RX ORDER — CLONAZEPAM 0.5 MG/1
0.5 TABLET ORAL 2 TIMES DAILY
Status: DISCONTINUED | OUTPATIENT
Start: 2023-09-02 | End: 2023-09-03

## 2023-09-02 RX ORDER — VILAZODONE HYDROCHLORIDE 40 MG/1
40 TABLET ORAL DAILY
Status: DISCONTINUED | OUTPATIENT
Start: 2023-09-02 | End: 2023-09-04 | Stop reason: HOSPADM

## 2023-09-02 RX ORDER — NITROGLYCERIN 0.4 MG/1
0.4 TABLET SUBLINGUAL
Status: DISCONTINUED | OUTPATIENT
Start: 2023-09-02 | End: 2023-09-04 | Stop reason: HOSPADM

## 2023-09-02 RX ORDER — CLOPIDOGREL BISULFATE 75 MG/1
75 TABLET ORAL DAILY
Status: DISCONTINUED | OUTPATIENT
Start: 2023-09-03 | End: 2023-09-04 | Stop reason: HOSPADM

## 2023-09-02 RX ORDER — LORAZEPAM 2 MG/ML
1 INJECTION INTRAMUSCULAR ONCE
Status: COMPLETED | OUTPATIENT
Start: 2023-09-02 | End: 2023-09-02

## 2023-09-02 RX ADMIN — MECLIZINE HYDROCHLORIDE 50 MG: 25 TABLET ORAL at 12:00

## 2023-09-02 RX ADMIN — VILAZODONE HYDROCHLORIDE 40 MG: 40 TABLET, FILM COATED ORAL at 20:37

## 2023-09-02 RX ADMIN — ATORVASTATIN CALCIUM 40 MG: 20 TABLET, FILM COATED ORAL at 20:37

## 2023-09-02 RX ADMIN — HYDRALAZINE HYDROCHLORIDE 100 MG: 50 TABLET, FILM COATED ORAL at 20:37

## 2023-09-02 RX ADMIN — Medication 10 ML: at 16:13

## 2023-09-02 RX ADMIN — CLONAZEPAM 0.5 MG: 0.5 TABLET ORAL at 20:37

## 2023-09-02 RX ADMIN — LORAZEPAM 1 MG: 2 INJECTION INTRAMUSCULAR; INTRAVENOUS at 16:13

## 2023-09-02 RX ADMIN — Medication 10 ML: at 20:38

## 2023-09-02 NOTE — ED PROVIDER NOTES
EMERGENCY DEPARTMENT ENCOUNTER    Room Number:  115/1  PCP: Akash Rodriguez Jr., DO  Discussed/ obtained information from independent historians: patient      HPI:  Chief Complaint: dizziness  A complete HPI/ROS/PMH/PSH/SH/FH are unobtainable due to: none  Context: Flako Coreas is a 58 y.o. male with history of CVA, ataxia and hypertension who presents to the ED c/o dizziness.  He states he had 2 intensely dizzy episodes today.  They were both precipitated by position changes and he felt off balance, found it difficult to walk, got sweaty and nauseated and vomited.  In between the episodes he returned to normal.  He was recently admitted for similar symptoms, diagnosed with stroke.  Readmitted for recurrent symptoms, discharged to rehab facility and states that he was very dissatisfied with the condition of the place and left same day.  He states he is really been asymptomatic for the last week until today.  He had these 2 pretty intense episodes of dizziness.  He states currently he is asymptomatic and feels fine.  He denies chest pain shortness of breath fever chills upper respiratory symptoms urinary symptoms diarrhea.      External (non-ED) record review: Patient admitted 8/16/2023 to 8/24/2023.  He had presented to the ER with unsteady gait dizziness and blood pressure 220/140.  Imaging demonstrated 2 small cerebellar strokes.  This was felt to be small vessel ischemic disease from uncontrolled blood pressure after CTA and 2D echo were performed.  He was discharged with a Holter monitor, aspirin and Plavix for 21 days, atorvastatin and spironolactone in addition to amitriptyline, amlodipine, hydralazine and lisinopril.  Shortly after discharge he returned to the ER for dizziness and difficulty walking upon arriving home from his discharge.  He was readmitted from the 19th to the 24th.  Repeat MRI showed collection of numerous small foci of acute infarct in the left cerebellar hemisphere in the left  PICA distribution.  This was felt to be due to a more sensitive MRI and not necessarily new infarcts.  MEGHANN did not show any clear source of embolism, discharged with a 2-week Zio patch and outpatient referral for vestibular rehab.      PAST MEDICAL HISTORY  Active Ambulatory Problems     Diagnosis Date Noted    Mixed anxiety depressive disorder 12/11/2012    Mixed hyperlipidemia 06/26/2017    Essential hypertension 06/26/2017    Diverticulosis 07/27/2018    Nodule of spleen 06/27/2018    Chronic bilateral lower abdominal pain 08/10/2018    Lepe's esophagus without dysplasia 10/10/2018    GERD (gastroesophageal reflux disease) 09/18/2018    History of esophagitis 12/08/2022    Dizziness 08/16/2023    Acute CVA (cerebrovascular accident) 08/17/2023    Hypertensive emergency 08/19/2023    Ataxia 08/19/2023    CVA (cerebral vascular accident) 08/22/2023     Resolved Ambulatory Problems     Diagnosis Date Noted    Abdominal pain 06/20/2018    Generalized abdominal pain 12/08/2022    Gastrointestinal hemorrhage 12/08/2022     Past Medical History:   Diagnosis Date    ADHD (attention deficit hyperactivity disorder)     Anxiety     Lepe's esophagus     Benign prostatic hyperplasia Surgery in 12/2021    Clotting disorder Intestinal    Depression     Diverticulitis     Duodenitis     Enteritis     Failure to thrive (child)     Family history of blood clots     Hyperlipidemia     Hypertension     Low testosterone     Microcytosis     Pancreatitis     Pneumonia     PONV (postoperative nausea and vomiting)     Seasonal affective disorder     Shoulder injury     Stroke     Unexplained weight loss          PAST SURGICAL HISTORY  Past Surgical History:   Procedure Laterality Date    COLON SURGERY      COLONOSCOPY      COLONOSCOPY N/A 12/11/2022    Procedure: COLONOSCOPY INTO CECUM WITH HOT SNARE POLYPECTOMY;  Surgeon: Albert Gallo MD;  Location: [facility] ENDOSCOPY;  Service: Gastroenterology;  Laterality: N/A;  PRE- GI  BLEED  POST- DIVERTICULOSIS, POLYP    ENDOSCOPY N/A 12/10/2022    Procedure: ESOPHAGOGASTRODUODENOSCOPY;  Surgeon: Albert Gallo MD;  Location: [facility] ENDOSCOPY;  Service: Gastroenterology;  Laterality: N/A;  PRE- DARK STOOLS  POST- BARRETTS ESOPHAGUS    FRACTURE SURGERY  1980    for broken arm    FRACTURE SURGERY      for broken hand    INCISION AND DRAINAGE ABSCESS  2015    anal    PROSTATE SURGERY      SMALL INTESTINE SURGERY      TONSILLECTOMY           FAMILY HISTORY  Family History   Problem Relation Age of Onset    Stroke Mother     Stroke Father          SOCIAL HISTORY  Social History     Socioeconomic History    Marital status: Single   Tobacco Use    Smoking status: Never    Smokeless tobacco: Never   Vaping Use    Vaping Use: Never used   Substance and Sexual Activity    Alcohol use: Yes     Alcohol/week: 20.0 standard drinks     Types: 10 Cans of beer, 10 Shots of liquor per week     Comment: pt states very rarely    Drug use: Yes     Frequency: 1.0 times per week     Types: Marijuana     Comment: Pt states he may have smoked marijuana 3 weeks ago (stated on 11-07-18)    Sexual activity: Not Currently     Partners: Female         ALLERGIES  Patient has no known allergies.        REVIEW OF SYSTEMS  Review of Systems         PHYSICAL EXAM  ED Triage Vitals   Temp Heart Rate Resp BP SpO2   09/02/23 1058 09/02/23 1058 09/02/23 1113 09/02/23 1103 09/02/23 1058   99.5 °F (37.5 °C) 89 18 (!) 164/104 96 %      Temp src Heart Rate Source Patient Position BP Location FiO2 (%)   09/02/23 1058 09/02/23 1058 09/02/23 1113 09/02/23 1113 --   Tympanic Monitor Sitting Left arm        Physical Exam      GENERAL: no acute distress  HENT: normocephalic, atraumatic  EYES: no scleral icterus  CV: regular rhythm, normal rate  RESPIRATORY: normal effort, CTA B  ABDOMEN: nondistended  MUSCULOSKELETAL: no deformity  NEURO: Neuro: Alert and oriented x3, face symmetric, no facial droop, eyebrows raise equally bilaterally,  tongue midline, no dysarthria, no aphasia, extraocular motion intact, no nystagmus, visual acuity grossly normal, cranial nerves II through XII intact, moves all extremities well, 5 out of 5 strength in all extremities, sensation intact light touch all extremities, no ataxia, no tremor.  PSYCH:  calm, cooperative  SKIN: warm, dry    Vital signs and nursing notes reviewed.          LAB RESULTS  Recent Results (from the past 24 hour(s))   Comprehensive Metabolic Panel    Collection Time: 09/02/23 11:59 AM    Specimen: Blood   Result Value Ref Range    Glucose 112 (H) 65 - 99 mg/dL    BUN 19 6 - 20 mg/dL    Creatinine 0.89 0.76 - 1.27 mg/dL    Sodium 143 136 - 145 mmol/L    Potassium 4.2 3.5 - 5.2 mmol/L    Chloride 105 98 - 107 mmol/L    CO2 23.0 22.0 - 29.0 mmol/L    Calcium 9.9 8.6 - 10.5 mg/dL    Total Protein 7.6 6.0 - 8.5 g/dL    Albumin 4.8 3.5 - 5.2 g/dL    ALT (SGPT) 27 1 - 41 U/L    AST (SGOT) 18 1 - 40 U/L    Alkaline Phosphatase 71 39 - 117 U/L    Total Bilirubin 0.4 0.0 - 1.2 mg/dL    Globulin 2.8 gm/dL    A/G Ratio 1.7 g/dL    BUN/Creatinine Ratio 21.3 7.0 - 25.0    Anion Gap 15.0 5.0 - 15.0 mmol/L    eGFR 100.6 >60.0 mL/min/1.73   CBC Auto Differential    Collection Time: 09/02/23 11:59 AM    Specimen: Blood   Result Value Ref Range    WBC 10.04 3.40 - 10.80 10*3/mm3    RBC 5.68 4.14 - 5.80 10*6/mm3    Hemoglobin 15.3 13.0 - 17.7 g/dL    Hematocrit 45.9 37.5 - 51.0 %    MCV 80.8 79.0 - 97.0 fL    MCH 26.9 26.6 - 33.0 pg    MCHC 33.3 31.5 - 35.7 g/dL    RDW 15.0 12.3 - 15.4 %    RDW-SD 43.7 37.0 - 54.0 fl    MPV 10.3 6.0 - 12.0 fL    Platelets 344 140 - 450 10*3/mm3    Neutrophil % 75.1 42.7 - 76.0 %    Lymphocyte % 12.9 (L) 19.6 - 45.3 %    Monocyte % 6.1 5.0 - 12.0 %    Eosinophil % 5.3 0.3 - 6.2 %    Basophil % 0.4 0.0 - 1.5 %    Immature Grans % 0.2 0.0 - 0.5 %    Neutrophils, Absolute 7.54 (H) 1.70 - 7.00 10*3/mm3    Lymphocytes, Absolute 1.30 0.70 - 3.10 10*3/mm3    Monocytes, Absolute 0.61 0.10 -  0.90 10*3/mm3    Eosinophils, Absolute 0.53 (H) 0.00 - 0.40 10*3/mm3    Basophils, Absolute 0.04 0.00 - 0.20 10*3/mm3    Immature Grans, Absolute 0.02 0.00 - 0.05 10*3/mm3    nRBC 0.0 0.0 - 0.2 /100 WBC   ECG 12 Lead Other; dizzy    Collection Time: 09/02/23  1:13 PM   Result Value Ref Range    QT Interval 428 ms    QTC Interval 439 ms       Ordered the above labs and reviewed the results.        RADIOLOGY  No Radiology Exams Resulted Within Past 24 Hours    Ordered the above noted radiological studies. Reviewed by me in PACS.            PROCEDURES  Procedures              MEDICATIONS GIVEN IN ER  Medications   sodium chloride 0.9 % flush 10 mL (has no administration in time range)   sodium chloride 0.9 % flush 10 mL (has no administration in time range)   sodium chloride 0.9 % infusion 40 mL (has no administration in time range)   ondansetron (ZOFRAN) tablet 4 mg (has no administration in time range)     Or   ondansetron (ZOFRAN) injection 4 mg (has no administration in time range)   nitroglycerin (NITROSTAT) SL tablet 0.4 mg (has no administration in time range)   acetaminophen (TYLENOL) tablet 650 mg (has no administration in time range)   LORazepam (ATIVAN) injection 1 mg (has no administration in time range)   meclizine (ANTIVERT) tablet 50 mg (50 mg Oral Given 9/2/23 1200)                   MEDICAL DECISION MAKING, PROGRESS, and CONSULTS    All labs have been independently reviewed by me.  All radiology studies have been reviewed by me and I have also reviewed the radiology report.   EKG's independently viewed and interpreted by me.  Discussion below represents my analysis of pertinent findings related to patient's condition, differential diagnosis, treatment plan and final disposition.            Orders placed during this visit:  Orders Placed This Encounter   Procedures    MRI Brain Without Contrast    Comprehensive Metabolic Panel    CBC Auto Differential    CBC (No Diff)    Basic Metabolic Panel    Diet:  Cardiac Diets; Healthy Heart (2-3 Na+); Texture: Regular Texture (IDDSI 7); Fluid Consistency: Thin (IDDSI 0)    Intake & Output    Weigh Patient    Place Sequential Compression Device    Maintain Sequential Compression Device    Telemetry - Maintain IV Access    Telemetry - Place Orders & Notify Provider of Results When Patient Experiences Acute Chest Pain, Dysrhythmia or Respiratory Distress    May Be Off Telemetry for Tests    Vital Signs    Code Status and Medical Interventions:    Inpatient Neurology Consult Stroke    ECG 12 Lead Other; dizzy    Insert Peripheral IV    Initiate ED Observation Status    CBC & Differential           Differential diagnosis:  TIA, CVA, peripheral vertigo, dehydration, anemia, arrhythmia      Independent interpretation of labs, radiology studies, and discussions with consultants:  ED Course as of 09/02/23 1533   Sat Sep 02, 2023   1201 I discussed the patient with Dr. Kraus, stroke neurology.  Based on patient's recent history, she recommends admission to observation for repeat evaluation. [KA]   1315 I discussed the patient with Lashell Perez PA-C for the ED observation unit including history and presentation and she agrees to admit to the ED observation unit on behalf of Dr. Thakkar [KA]   1403 I discussed the patient with Dr. Leon, neurology who is familiar with patient from recent admissions and has evaluated the patient at the bedside and recommends MRI.  [KA]      ED Course User Index  [KA] Kat Dailey PA       - Shared decision making: Recommended admission and patient is agreeable          Additional sources:    - Chronic or social conditions impacting care: Recurrent vertigo          DIAGNOSIS  Final diagnoses:   Dizziness   Uncontrolled hypertension               Latest Documented Vital Signs:  As of 15:33 EDT  BP- 175/100 HR- 69 Temp- 98.6 °F (37 °C) (Oral) O2 sat- 96%              --    Please note that portions of this were completed with a voice recognition  program.       Note Disclaimer: At [facility], we believe that sharing information builds trust and better relationships. You are receiving this note because you are receiving care at [facility] or recently visited. It is possible you will see health information before a provider has talked with you about it. This kind of information can be easy to misunderstand. To help you fully understand what it means for your health, we urge you to discuss this note with your provider.             Kat Dailey PA  09/02/23 1533

## 2023-09-02 NOTE — H&P
[facility]   HISTORY AND PHYSICAL    Patient Name: Flako Coreas  : 1965  MRN: [id number]  Primary Care Physician:  Akash Rodriguez Jr.,   Date of admission: 2023    Subjective   Subjective     Chief Complaint:   Chief Complaint   Patient presents with    Dizziness     D/c'd from hospital yesterday         HPI:    Flako Coreas is a 58 y.o. male with history of ADHD, anxiety and depression, GERD, hypertension, hyperlipidemia, and recent acute CVA presented to the emergency department today complaining of dizziness and difficulty ambulating.    Patient first presented to ED for the same on 2023.  He was extremely hypertensive during that admission.  He was admitted to observation unit but was transferred to the hospital to [facility].  Patient was found to have left inferior cerebellar stroke.  CTA showed acute appearing left V4 occlusion.  He was discharged on the  and then readmitted that same day for worsening syndromes.  MRI showed expansion of stroke however this was thought to be due to hypersensitivity MRI.  He had extensive work-up including MEGHANN which did not show any evidence of embolism.  Patient was placed on 2-week Holter monitor but adhesive weakened due to heat and monitor fell off.  He had a total of 48 hours of monitoring and read is pending.  Patient was discharged to acute rehab for gait training but left after 8 hours due to the conditions upon arrival.    Patient states he has been home for approximately 1 week.  He states he has been doing well until today.  He was at [employer] eating breakfast and when he stood to leave he became severely dizzy.  He states he had difficulty walking and became diaphoretic and vomited.  Patient states he had to lie down on the floor.  His son picked him up and when he got home he lied down on the couch for some time.  His symptoms improved but when he got up to use the restroom they occurred again.      ED evaluation reviewed,  laboratory evaluation largely within normal limits except glucose 112.  Patient was seen by neurology in ED, Dr. Leon.  Patient is being admitted to observation unit for repeat MRI and further evaluation.    Review of Systems   All systems were reviewed and negative except for: What is discussed in the HPI    Personal History     Past Medical History:   Diagnosis Date    ADHD (attention deficit hyperactivity disorder)     On going issue    Anxiety     Lepe's esophagus     Benign prostatic hyperplasia Surgery in 12/2021    Clotting disorder Intestinal    Depression     Diverticulitis     Diverticulosis     Duodenitis     Enteritis     Failure to thrive (child)     Family history of blood clots     GERD (gastroesophageal reflux disease)     Hyperlipidemia     Hypertension     Hypertensive emergency     Low testosterone     Microcytosis     Pancreatitis     Pneumonia     PONV (postoperative nausea and vomiting)     Seasonal affective disorder     Shoulder injury     Stroke     Unexplained weight loss        Past Surgical History:   Procedure Laterality Date    COLON SURGERY      COLONOSCOPY      COLONOSCOPY N/A 12/11/2022    Procedure: COLONOSCOPY INTO CECUM WITH HOT SNARE POLYPECTOMY;  Surgeon: Albert Gallo MD;  Location: [facility] ENDOSCOPY;  Service: Gastroenterology;  Laterality: N/A;  PRE- GI BLEED  POST- DIVERTICULOSIS, POLYP    ENDOSCOPY N/A 12/10/2022    Procedure: ESOPHAGOGASTRODUODENOSCOPY;  Surgeon: Albert Gallo MD;  Location: [facility] ENDOSCOPY;  Service: Gastroenterology;  Laterality: N/A;  PRE- DARK STOOLS  POST- BARRETTS ESOPHAGUS    FRACTURE SURGERY  1980    for broken arm    FRACTURE SURGERY      for broken hand    INCISION AND DRAINAGE ABSCESS  2015    anal    PROSTATE SURGERY      SMALL INTESTINE SURGERY      TONSILLECTOMY         Family History: family history includes Stroke in his father and mother. Otherwise pertinent FHx was reviewed and not pertinent to current issue.    Social  History:  reports that he has never smoked. He has never used smokeless tobacco. He reports current alcohol use of about 20.0 standard drinks per week. He reports current drug use. Frequency: 1.00 time per week. Drug: Marijuana.    Home Medications:  amLODIPine, aspirin, atorvastatin, clopidogrel, hydrALAZINE, hydrOXYzine, hydroCHLOROthiazide, lisinopril, ondansetron, spironolactone, and vilazodone    Allergies:  No Known Allergies    Objective   Objective     Vitals:   Temp:  [98.6 °F (37 °C)-99.5 °F (37.5 °C)] 98.6 °F (37 °C)  Heart Rate:  [] 69  Resp:  [18] 18  BP: (151-175)/() 175/100  Physical Exam     GENERAL: 58 y.o. YO male a/o x 4, NAD  SKIN: Warm pink and dry   HEENT:  PERRL, EOM intact, conjunctivae normal, sclerae clear  NECK: supple, no JVD  LUNGS: Clear to auscultation bilaterally without wheezes, rales or rhonchi.  No accessory muscle use and no nasal flaring.  CARDIAC:  Regular rate and rhythm, S1-S2.  No murmurs, rubs or gallops.  No peripheral edema.  Equal pulses bilaterally.  ABDOMEN: Soft, nontender, nondistended.  No guarding or rebound tenderness.  Normal bowel sounds.  MUSCULOSKELETAL: Moves all extremities well.  No deformity.  NEURO: Cranial nerves II through XII grossly intact.  No gross focal deficits.  Ataxic gait.  Alert.  Normal speech and motor.  PSYCH: Normal mood and affect      Result Review    Result Review:  I have personally reviewed the results from the time of this admission to 9/2/2023 15:07 EDT and agree with these findings:  [x]  Laboratory list / accordion  []  Microbiology  []  Radiology  []  EKG/Telemetry   []  Cardiology/Vascular   []  Pathology  [x]  Old records  []  Other:  Most notable findings include:     MRI brain without contrast 8/20/2023    IMPRESSION:     There is a small collection of numerous small foci of acute infarction  within the inferior and medial aspect of the left cerebellar hemisphere  within the left PICA distribution. A larger number  of infarcts are  appreciated on the current exam although it is difficult to assess  whether these are truly new foci of acute infarction as the current  study was performed on a more sensitive 3 Rosa MRI unit as compared to  the previously implemented 1.5 Rosa MRI machine. As such, the  diffusion-weighted imaging sequence is more sensitive on the current  examination.     These findings were discussed with Dr. Kraus on 08/20/2023 at  approximately 3:54 PM.         This report was finalized on 8/20/2023 4:18 PM by Dr. Aniceto Deleon M.D.    Assessment & Plan   Assessment / Plan     Brief Patient Summary:  Flako Coreas is a 58 y.o. male who is being admitted to observation unit for further evaluation of dizziness    Active Hospital Problems:  Active Hospital Problems    Diagnosis     **Dizziness      Plan:     Dizziness  Ataxia  History of left inferior cerebellar stroke  -Repeat MRI brain without contrast  -Consult neurology, Dr. Leon  -Ambulatory referral to physical therapy, patient was recently discharged to rehab and left after 8 hours due to conditions at the facility that he was unhappy with    Hypertension  -Patient is hypertensive today with diastolic pressure above 100  -Patient has had his hydrochlorothiazide, lisinopril, spironolactone today, continue  -Continue hydralazine every 8 hours  -Continue amlodipine at nighttime  -Continue to monitor    Anxiety and depression  -Continue hydroxyzine as needed  -Continue for Viibryd      DVT prophylaxis:  Mechanical DVT prophylaxis orders are present.    CODE STATUS:    Level Of Support Discussed With: Patient  Code Status (Patient has no pulse and is not breathing): CPR (Attempt to Resuscitate)  Medical Interventions (Patient has pulse or is breathing): Full Support    Admission Status:  I believe this patient meets observation status.     75 minutes has been spent by [facility] providers in the care of this patient while  under observation status    I wore an appropriate PPE during this patient encounter.  Hand hygeine performed before and after seeing the patient.    Electronically signed by ZHOU Martin, 09/02/23, 3:07 PM EDT.

## 2023-09-02 NOTE — NURSING NOTE
Patient just returned from MRI. Patient is alert and oriented times 4. Stand by assist with activity. On room air.

## 2023-09-02 NOTE — NURSING NOTE
Patient admitted to observation for dizziness. Blood pressure elevated, provider aware. Patient off the floor in MRI.

## 2023-09-02 NOTE — ED NOTES
"Nursing report ED to floor  Flako Coreas  58 y.o.  male    HPI :   Chief Complaint   Patient presents with    Dizziness     D/c'd from hospital yesterday       Admitting doctor:   Barry Thakkar MD    Admitting diagnosis:   The primary encounter diagnosis was Dizziness. A diagnosis of Uncontrolled hypertension was also pertinent to this visit.    Code status:   Current Code Status       Date Active Code Status Order ID Comments User Context       9/2/2023 1318 CPR (Attempt to Resuscitate) [id number]  Lashell Perez PA ED        Question Answer    Code Status (Patient has no pulse and is not breathing) CPR (Attempt to Resuscitate)    Medical Interventions (Patient has pulse or is breathing) Full Support    Level Of Support Discussed With Patient                    Allergies:   Patient has no known allergies.    Isolation:   No active isolations    Intake and Output  No intake or output data in the 24 hours ending 09/02/23 1344    Weight:       09/02/23  1113   Weight: 102 kg (225 lb)       Most recent vitals:   Vitals:    09/02/23 1103 09/02/23 1111 09/02/23 1113 09/02/23 1131   BP: (!) 164/104 (!) 153/102 (!) 153/102 151/94   BP Location:   Left arm    Patient Position:   Sitting    Pulse: 100 73 71 68   Resp:   18    Temp:       TempSrc:       SpO2:  94% 95% 93%   Weight:   102 kg (225 lb)    Height:   188 cm (74\")        Active LDAs/IV Access:   Lines, Drains & Airways       Active LDAs       Name Placement date Placement time Site Days    Peripheral IV 09/02/23 1116 Right Antecubital 09/02/23  1116  Antecubital  less than 1                    Labs (abnormal labs have a star):   Labs Reviewed   COMPREHENSIVE METABOLIC PANEL - Abnormal; Notable for the following components:       Result Value    Glucose 112 (*)     All other components within normal limits    Narrative:     GFR Normal >60  Chronic Kidney Disease <60  Kidney Failure <15     CBC WITH AUTO DIFFERENTIAL - Abnormal; Notable for the following " components:    Lymphocyte % 12.9 (*)     Neutrophils, Absolute 7.54 (*)     Eosinophils, Absolute 0.53 (*)     All other components within normal limits   CBC AND DIFFERENTIAL    Narrative:     The following orders were created for panel order CBC & Differential.  Procedure                               Abnormality         Status                     ---------                               -----------         ------                     CBC Auto Differential[[id number]]        Abnormal            Final result                 Please view results for these tests on the individual orders.       EKG:   ECG 12 Lead Other; dizzy   Preliminary Result   HEART RATE= 63  bpm   RR Interval= 952  ms   ME Interval= 170  ms   P Horizontal Axis= -38  deg   P Front Axis= -11  deg   QRSD Interval= 94  ms   QT Interval= 428  ms   QTcB= 439  ms   QRS Axis= 21  deg   T Wave Axis= 30  deg   - NORMAL ECG -   Sinus rhythm   Electronically Signed By:    Date and Time of Study: 2023-09-02 13:13:33          Meds given in ED:   Medications   sodium chloride 0.9 % flush 10 mL (has no administration in time range)   sodium chloride 0.9 % flush 10 mL (has no administration in time range)   sodium chloride 0.9 % infusion 40 mL (has no administration in time range)   ondansetron (ZOFRAN) tablet 4 mg (has no administration in time range)     Or   ondansetron (ZOFRAN) injection 4 mg (has no administration in time range)   nitroglycerin (NITROSTAT) SL tablet 0.4 mg (has no administration in time range)   acetaminophen (TYLENOL) tablet 650 mg (has no administration in time range)   meclizine (ANTIVERT) tablet 50 mg (50 mg Oral Given 9/2/23 1200)       Imaging results:  No radiology results for the last day    Ambulatory status:   - SUPERVISION    Social issues:   Social History     Socioeconomic History    Marital status: Single   Tobacco Use    Smoking status: Never    Smokeless tobacco: Never   Vaping Use    Vaping Use: Never used   Substance and  Sexual Activity    Alcohol use: Yes     Alcohol/week: 20.0 standard drinks     Types: 10 Cans of beer, 10 Shots of liquor per week     Comment: pt states very rarely    Drug use: Yes     Frequency: 1.0 times per week     Types: Marijuana     Comment: Pt states he may have smoked marijuana 3 weeks ago (stated on 11-07-18)    Sexual activity: Not Currently     Partners: Female       NIH Stroke Scale:       Garcia Grewal RN  09/02/23 13:44 EDT

## 2023-09-02 NOTE — CONSULTS
Neurology Consult Note    Consult Date: 9/2/2023    Referring MD: Barry Thakkar MD    Reason for Consult I have been asked to see the patient in neurological consultation to render advice and opinion regarding dizziness, ataxia    Flako Coreas is a 58 y.o. male with ADHD, anxiety, hypertension, hyperlipidemia, well-known to me from his prior admissions for stroke.  He developed acute onset of ataxia and was found to have a left inferior cerebellar stroke.  CTA showed acute appearing left V4 occlusion.      He was discharged and then readmitted for worsening symptoms.  MRI showed slight expansion of his stroke however this was potentially thought to be due to a high resolution MRI.  During that admission he was worked up thoroughly including MEGHANN which was normal.  Short-term Holter was done which is pending.  Antiphospholipid screen was negative.    Patient reports that he has been home for the last week and had been doing very well with minimal residual symptoms.  Today he developed 2 episodes of abrupt relatively unprovoked ataxia and dizziness lasting about 30 minutes each with associated photophobia and headache.  Currently he feels better and back to his previous baseline.  He was concerned about the etiology of the episodes and presented back to the ED for neurologic evaluation.    Past Medical History:   Diagnosis Date    ADHD (attention deficit hyperactivity disorder)     On going issue    Anxiety     Lepe's esophagus     Benign prostatic hyperplasia Surgery in 12/2021    Clotting disorder Intestinal    Depression     Diverticulitis     Diverticulosis     Duodenitis     Enteritis     Failure to thrive (child)     Family history of blood clots     GERD (gastroesophageal reflux disease)     Hyperlipidemia     Hypertension     Hypertensive emergency     Low testosterone     Microcytosis     Pancreatitis     Pneumonia     PONV (postoperative nausea and vomiting)     Seasonal affective disorder      "Shoulder injury     Stroke     Unexplained weight loss        Exam  /100 (BP Location: Right arm, Patient Position: Lying)   Pulse 69   Temp 98.6 °F (37 °C) (Oral)   Resp 18   Ht 188 cm (74\")   Wt 102 kg (225 lb)   SpO2 96%   BMI 28.89 kg/m²   Gen: NAD, vitals reviewed  MS: oriented x3, recent/remote memory intact, normal attention/concentration, language intact, no neglect.  CN: visual acuity grossly normal, PERRL, EOMI with no nystagmus, negative head impulse test bilaterally, negative test of skew, no facial droop, no dysarthria  Motor: 5/5 throughout upper and lower extremities, normal tone  Coordination: Slight right lower extremity dysmetria, otherwise intact  Gait: Unsteady station, positive Romberg, slight stumbling to the left at times, unable to perform tandem gait    DATA:    Lab Results   Component Value Date    GLUCOSE 112 (H) 09/02/2023    CALCIUM 9.9 09/02/2023     09/02/2023    K 4.2 09/02/2023    CO2 23.0 09/02/2023     09/02/2023    BUN 19 09/02/2023    CREATININE 0.89 09/02/2023    EGFRIFAFRI >60 09/28/2018    EGFRIFNONA 98 11/05/2018    BCR 21.3 09/02/2023    ANIONGAP 15.0 09/02/2023     Lab Results   Component Value Date    WBC 10.04 09/02/2023    HGB 15.3 09/02/2023    HCT 45.9 09/02/2023    MCV 80.8 09/02/2023     09/02/2023       Lab review: CBC, BMP unremarkable    Imaging review: MRI brain pending.  I personally reviewed his CTA of the head and neck again as described above which showed a critical stenosis versus occlusion of the left V4 segment correlating to his prior infarct.    Diagnoses:  Recent stroke due to left vertebral artery occlusion, embolic    Pre-stroke MRS: 0  NIHSS: 1    Comment: Exacerbation of recent cerebellar stroke.  Differential diagnosis includes symptomatic left vertebral stenosis/near occlusion versus vestibular migraine as a complication of subacute stroke.    PLAN:  Check follow-up MRI brain      "

## 2023-09-03 LAB
ANION GAP SERPL CALCULATED.3IONS-SCNC: 12.4 MMOL/L (ref 5–15)
BUN SERPL-MCNC: 19 MG/DL (ref 6–20)
BUN/CREAT SERPL: 20.9 (ref 7–25)
CALCIUM SPEC-SCNC: 9.2 MG/DL (ref 8.6–10.5)
CHLORIDE SERPL-SCNC: 106 MMOL/L (ref 98–107)
CO2 SERPL-SCNC: 20.6 MMOL/L (ref 22–29)
CREAT SERPL-MCNC: 0.91 MG/DL (ref 0.76–1.27)
DEPRECATED RDW RBC AUTO: 41.7 FL (ref 37–54)
EGFRCR SERPLBLD CKD-EPI 2021: 98.9 ML/MIN/1.73
ERYTHROCYTE [DISTWIDTH] IN BLOOD BY AUTOMATED COUNT: 14.6 % (ref 12.3–15.4)
GLUCOSE SERPL-MCNC: 104 MG/DL (ref 65–99)
HCT VFR BLD AUTO: 44.1 % (ref 37.5–51)
HGB BLD-MCNC: 14.8 G/DL (ref 13–17.7)
MCH RBC QN AUTO: 26.6 PG (ref 26.6–33)
MCHC RBC AUTO-ENTMCNC: 33.6 G/DL (ref 31.5–35.7)
MCV RBC AUTO: 79.3 FL (ref 79–97)
PLATELET # BLD AUTO: 304 10*3/MM3 (ref 140–450)
PMV BLD AUTO: 9.6 FL (ref 6–12)
POTASSIUM SERPL-SCNC: 4.2 MMOL/L (ref 3.5–5.2)
RBC # BLD AUTO: 5.56 10*6/MM3 (ref 4.14–5.8)
SODIUM SERPL-SCNC: 139 MMOL/L (ref 136–145)
WBC NRBC COR # BLD: 8.14 10*3/MM3 (ref 3.4–10.8)

## 2023-09-03 PROCEDURE — 90791 PSYCH DIAGNOSTIC EVALUATION: CPT | Performed by: SOCIAL WORKER

## 2023-09-03 PROCEDURE — 85027 COMPLETE CBC AUTOMATED: CPT | Performed by: PHYSICIAN ASSISTANT

## 2023-09-03 PROCEDURE — 63710000001 ONDANSETRON PER 8 MG: Performed by: PHYSICIAN ASSISTANT

## 2023-09-03 PROCEDURE — 99232 SBSQ HOSP IP/OBS MODERATE 35: CPT | Performed by: PSYCHIATRY & NEUROLOGY

## 2023-09-03 PROCEDURE — 96376 TX/PRO/DX INJ SAME DRUG ADON: CPT

## 2023-09-03 PROCEDURE — G0378 HOSPITAL OBSERVATION PER HR: HCPCS

## 2023-09-03 PROCEDURE — 25010000002 LORAZEPAM PER 2 MG: Performed by: EMERGENCY MEDICINE

## 2023-09-03 PROCEDURE — 80048 BASIC METABOLIC PNL TOTAL CA: CPT | Performed by: PHYSICIAN ASSISTANT

## 2023-09-03 RX ORDER — LORAZEPAM 2 MG/ML
0.5 INJECTION INTRAMUSCULAR ONCE
Status: COMPLETED | OUTPATIENT
Start: 2023-09-03 | End: 2023-09-03

## 2023-09-03 RX ADMIN — ATORVASTATIN CALCIUM 40 MG: 20 TABLET, FILM COATED ORAL at 21:19

## 2023-09-03 RX ADMIN — HYDRALAZINE HYDROCHLORIDE 100 MG: 50 TABLET, FILM COATED ORAL at 13:38

## 2023-09-03 RX ADMIN — Medication 10 ML: at 21:19

## 2023-09-03 RX ADMIN — CLOPIDOGREL BISULFATE 75 MG: 75 TABLET, FILM COATED ORAL at 08:37

## 2023-09-03 RX ADMIN — HYDROXYZINE HYDROCHLORIDE 25 MG: 25 TABLET ORAL at 11:48

## 2023-09-03 RX ADMIN — HYDRALAZINE HYDROCHLORIDE 100 MG: 50 TABLET, FILM COATED ORAL at 05:51

## 2023-09-03 RX ADMIN — Medication 400 MG: at 13:38

## 2023-09-03 RX ADMIN — VILAZODONE HYDROCHLORIDE 40 MG: 40 TABLET, FILM COATED ORAL at 08:37

## 2023-09-03 RX ADMIN — HYDROCHLOROTHIAZIDE 12.5 MG: 12.5 TABLET ORAL at 08:37

## 2023-09-03 RX ADMIN — ONDANSETRON HYDROCHLORIDE 4 MG: 4 TABLET, FILM COATED ORAL at 21:19

## 2023-09-03 RX ADMIN — ASPIRIN 81 MG: 81 TABLET, COATED ORAL at 08:37

## 2023-09-03 RX ADMIN — MAGNESIUM OXIDE 400 MG (241.3 MG MAGNESIUM) TABLET 400 MG: TABLET at 13:38

## 2023-09-03 RX ADMIN — AMLODIPINE BESYLATE 10 MG: 10 TABLET ORAL at 21:19

## 2023-09-03 RX ADMIN — LORAZEPAM 0.5 MG: 2 INJECTION INTRAMUSCULAR; INTRAVENOUS at 06:12

## 2023-09-03 RX ADMIN — SPIRONOLACTONE 25 MG: 25 TABLET ORAL at 08:37

## 2023-09-03 RX ADMIN — Medication 10 ML: at 08:39

## 2023-09-03 RX ADMIN — HYDROXYZINE HYDROCHLORIDE 25 MG: 25 TABLET ORAL at 03:34

## 2023-09-03 RX ADMIN — HYDRALAZINE HYDROCHLORIDE 100 MG: 50 TABLET, FILM COATED ORAL at 21:19

## 2023-09-03 RX ADMIN — LISINOPRIL 40 MG: 20 TABLET ORAL at 08:37

## 2023-09-03 NOTE — PROGRESS NOTES
ED OBSERVATION PROGRESS/DISCHARGE SUMMARY    Date of Admission: 9/2/2023   LOS: 0 days   PCP: Akash Rodriguez Jr., DO      Subjective: Patient reports significant anxiety dealing with his current diagnosis and financial situation.    Hospital Outcome:   58-year-old male with recent acute cerebellar CVA who presents to [facility] ER with dizziness and difficulty ambulating.  In the ER, laboratory evaluation largely within normal limits the exception of a glucose at 112.  In the ER patient was evaluated by Dr. Colby with neurology recommending further evaluation with MRI.    MRI of the brain without revealed no new or abnormality when compared to prior MRI 13 days ago with subacute infarcts in the medial left cerebellum and posterior left cerebellum, there is note of minimal small vessel disease and minimal sinus disease.    Due to patient's ongoing anxiety, depression, and panic, [facility] was consulted.  Patient was seen by  and they recommend psychiatry to follow.  Psychiatry will not be here until tomorrow (Monday).  Therefore, we will observe an additional night so that patient can be seen by psychiatrist.  We will also consult physical therapy to evaluate and treat.  Anticipate patient will be discharged home tomorrow.    ROS:  General: no fevers, chills  Respiratory: no cough, dyspnea  Cardiovascular: no chest pain, palpitations  Abdomen: No abdominal pain, nausea, vomiting, or diarrhea  Neurologic: No focal weakness    Objective   Physical Exam:  I have reviewed the vital signs.  Temp:  [97.2 °F (36.2 °C)-99.5 °F (37.5 °C)] 97.7 °F (36.5 °C)  Heart Rate:  [] 68  Resp:  [18] 18  BP: (132-175)/() 146/96  General Appearance:    Alert, cooperative, no distress  Head:    Normocephalic, atraumatic  Eyes:    Sclerae anicteric  Neck:   Supple, no mass  Lungs: Clear to auscultation bilaterally, respirations unlabored  Heart: Regular rate and rhythm, S1 and S2 normal,  no murmur, rub or gallop  Abdomen:  Soft, non-tender, bowel sounds active, nondistended  Extremities: No clubbing, cyanosis, or edema to lower extremities  Pulses:  2+ and symmetric in distal lower extremities  Skin: No rashes   Neurologic: Oriented x3, Normal strength to extremities    Results Review:    I have reviewed the labs, radiology results and diagnostic studies.    Results from last 7 days   Lab Units 09/02/23  1159   WBC 10*3/mm3 10.04   HEMOGLOBIN g/dL 15.3   HEMATOCRIT % 45.9   PLATELETS 10*3/mm3 344     Results from last 7 days   Lab Units 09/02/23  1159   SODIUM mmol/L 143   POTASSIUM mmol/L 4.2   CHLORIDE mmol/L 105   CO2 mmol/L 23.0   BUN mg/dL 19   CREATININE mg/dL 0.89   CALCIUM mg/dL 9.9   BILIRUBIN mg/dL 0.4   ALK PHOS U/L 71   ALT (SGPT) U/L 27   AST (SGOT) U/L 18   GLUCOSE mg/dL 112*     Imaging Results (Last 24 Hours)       Procedure Component Value Units Date/Time    MRI Brain Without Contrast [[id number]] Collected: 09/02/23 1800     Updated: 09/02/23 2028    Narrative:      STAT MRI OF THE BRAIN ON 09/02/2023     CLINICAL HISTORY: Dizziness, nausea, loss of balance, symptoms occurred  twice this morning, had a stroke 2 weeks ago in the left cerebellum.     TECHNIQUE: Axial T1, FLAIR, fat-suppressed T2, axial diffusion and  gradient echo T2 and sagittal T1-weighted images were obtained of the  entire head.     FINDINGS: This is correlated to MRI of the brain 13 days ago on  08/20/2023. On MRI of the brain 08/20/2023, the patient had a 15 x 6 mm  acute infarct in the inferior medial left cerebellum. Tiny adjacent 3 mm  acute infarcts in the posterior left cerebellum. These infarcts have  evolved to tiny subacute infarcts. No new infarcts are seen with no  additional diffusion-weighted abnormality seen. There is some minimal T2  high signal in the periventricular white matter consistent with minimal  small vessel disease. The ventricles are normal in size. I see no mass  effect. No midline  shift. No extra-axial fluid collections are  identified. On the gradient echo T2 weighted images no acute  intracranial hemorrhage is seen. There is minimal left anterior ethmoid  and left maxillary sinus mucosal thickening. The remainder of the  paranasal sinuses are clear. There is a tiny amount of fluid in the left  mastoid air cells. The right mastoid and middle ear cavity are clear.       Impression:      1. No new abnormality when compared to prior MRI of the brain 13 days  ago on 08/20/2023. The 15 x 6 mm acute infarct in the inferior medial  left cerebellum has evolved to a subacute infarct and there is a tiny  subacute 3 mm infarct in the posterior left cerebellum. These were acute  infarcts back on MRI of the brain 08/20/2023 and they are in the left  PICA territory.  2. There is minimal small vessel disease in cerebral white matter.  3. There is minimal anterior left ethmoid and inferior left maxillary  sinus mucosal thickening and small mount of fluid in the left mastoid  air cells unchanged.  4. The remainder of the MRI of the brain is normal with no new infarct  identified when compared to prior MRI of the brain 08/20/2023.     This report was finalized on 9/2/2023 8:25 PM by Dr. Miguel Turner M.D.               I have reviewed the medications.  ---------------------------------------------------------------------------------------------  Assessment & Plan   Assessment/Problem List    Dizziness      Plan:    Dizziness  Ataxia  History of left inferior cerebellar stroke  -Repeat MRI brain without contrast shows no new abnormality and compared to previous MRI 13 days ago  -Consult neurology, Dr. Leon  -Ambulatory referral to physical therapy, patient was recently discharged to rehab and left after 8 hours due to conditions at the facility that he was unhappy with    Anxiety and depression  -Continue hydroxyzine as needed  -Continue Viibryd  -Patient reports he has been off his Viibryd for  approximately 1 month  -Access center consulted, they have recommended psychiatrist to follow  -Psychiatrist will be in hospital tomorrow (Monday)     Hypertension  -Patient is hypertensive today with diastolic pressure above 100  -Patient has had his hydrochlorothiazide, lisinopril, spironolactone today, continue  -Continue hydralazine every 8 hours  -Continue amlodipine at nighttime  -Continue to monitor      Disposition: I anticipate patient will be discharged home tomorrow    This note will serve as progress note      53 minutes have been spent by [facility] providers in the care of this patient while under observation status.    Appropriate PPE worn during patient encounter.  Hand hygeine performed before and after seeing the patient.      ZHOU Martin 09/03/23 07:55 EDT

## 2023-09-03 NOTE — PROGRESS NOTES
MD ATTESTATION NOTE    The DON and I have discussed this patient's history, physical exam, and treatment plan.  I have reviewed the documentation and personally had a face to face interaction with the patient. I affirm the documentation and agree with the treatment and plan.  The attached note describes my personal findings.      I provided a substantive portion of the care of the patient.  I personally performed the physical exam in its entirety, and below are my findings.      Brief HPI: This patient is a 58-year-old male with a history of a recent cerebellar infarct admitted back to the observation unit today secondary to recurrent dizziness.  He states that the symptoms were made significantly worse with attempted ambulation and associated with diaphoresis as well as vomiting.  Currently, he states that the dizziness has significantly improved and other than some anxiety, he is resting comfortably.      PHYSICAL EXAM  ED Triage Vitals   Temp Heart Rate Resp BP SpO2   09/02/23 1058 09/02/23 1058 09/02/23 1113 09/02/23 1103 09/02/23 1058   99.5 °F (37.5 °C) 89 18 (!) 164/104 96 %      Temp src Heart Rate Source Patient Position BP Location FiO2 (%)   09/02/23 1058 09/02/23 1058 09/02/23 1113 09/02/23 1113 --   Tympanic Monitor Sitting Left arm          GENERAL: Resting comfortably and in no acute distress, nontoxic in appearance  HENT: nares patent  EYES: no scleral icterus  CV: regular rhythm, normal rate, no M/R/G  RESPIRATORY: normal effort, lungs clear bilaterally  ABDOMEN: soft, nontender, no rebound or guarding  MUSCULOSKELETAL: no deformity, no edema  NEURO: alert, moves all extremities, follows commands  PSYCH:  calm, cooperative  SKIN: warm, dry    Vital signs and nursing notes reviewed.        Plan: The patient is admitted to the observation unit per the recommendation of the stroke neurologist.  We will obtain a MRI and asked neurology to see in a.m. consultation.  We will continue to monitor and  reassess.

## 2023-09-03 NOTE — CONSULTS
"[facility] evaluated 58-year-old male for anxiety.  This is patient's third admission in several weeks with the first one being 2 weeks ago when he came in with having had a couple of strokes.  Patient admits he gets a lot of panic often and rated both his anxiety and depression currently as a 10/10.  Patient also states \"I cannot even think\".  Patient states he has not taken his Viibryd in about a month as his car has been broken down and he gets dizzy walking at times.  Patient has been taking his hydroxyzine 3 times as needed but admitted he took 6 the day he came in.  Patient states he took a gene test to figure out which medicines worked best for him and Viibryd was one that popped up but he has not taken it in a month.  Patient states he felt the Viibryd was helpful.  Patient is having a lot of financial stress and is feeling very overwhelmed.  Patient states his car broke down and he missed an interview last week and has another interview this coming Tuesday.  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  Patient states he has always had a solid work history.  Patient states he is sleeping a lot in order to avoid thinking about all his problems.  Patient states his appetite is okay.  Patient denies SI but also feels like he is \"at the end of my rope\".  Patient states he did have some suicidal thoughts 4 years ago but no attempts.  Patient states he shared this with his doctor at the time.  Patient has no other history of psychiatric treatment other than some counseling during his divorce which was 16 years ago.  Patient states he stopped drinking alcohol on the 16th of last month as well as quitting marijuana on the 16th.  Patient was fearful that these would have a negative impact on his stroke hx.  Patient states before he stopped drinking he was drinking approximately 6-8 beers over a whole weekend but did not drink during " the week.    Patient states he has a support system that includes his aunt who has helped him with some rent money last month, his son who is age 24 and some friends that sometimes can help him get places since his car is broken down.  Patient lives alone in an apartment and is working to try to find a job.  Patient states this has been difficult as he has had a lot of dizziness and his car is broken down.  Patient states he has applied for food stamps but was denied unemployment.  Patient states he would be interested in talking to an outpatient counselor and the psychiatrist was consulted to examine patient's medications.  Access will continue to follow.

## 2023-09-03 NOTE — PROGRESS NOTES
"DOS: 9/3/2023  NAME: Flako Coreas   : 1965  PCP: Akash Rodriguez Jr.,   Chief Complaint   Patient presents with    Dizziness     D/c'd from hospital yesterday       Chief complaint: ataxia  Subjective: Ataxia has improved today.  Still very anxious about his overall situation mainly with regarding being out of work.    Objective:  Vital signs: /90 (BP Location: Right arm, Patient Position: Lying)   Pulse 79   Temp 98 °F (36.7 °C) (Oral)   Resp 18   Ht 188 cm (74\")   Wt 102 kg (225 lb)   SpO2 94%   BMI 28.89 kg/m²    Gen: NAD, vitals reviewed  MS: oriented x3, recent/remote memory intact, normal attention/concentration, language intact, no neglect.  CN: visual acuity grossly normal, pupils symmetric, conjugate gaze, no facial asymmetry, no dysarthria  Motor: Moving all 4 extremities    Laboratory results:  Lab Results   Component Value Date    GLUCOSE 104 (H) 2023    CALCIUM 9.2 2023     2023    K 4.2 2023    CO2 20.6 (L) 2023     2023    BUN 19 2023    CREATININE 0.91 2023    EGFRIFAFRI >60 2018    EGFRIFNONA 98 2018    BCR 20.9 2023    ANIONGAP 12.4 2023     Lab Results   Component Value Date    WBC 8.14 2023    HGB 14.8 2023    HCT 44.1 2023    MCV 79.3 2023     2023     Lab Results   Component Value Date    LDL 62 2023     (H) 2023     (H) 2022            Review of labs: CBC, BMP unremarkable    Review and interpretation of imaging: I personally reviewed his MRI of the brain which shows a stable subacute left inferior cerebellar infarct.  No acute infarcts noted.  Radiology report reviewed.    Diagnoses:  Chronic left inferior cerebellar stroke  Left vertebral artery occlusion  Headache  Generalized anxiety and depression    Comment: Symptomatically improved, MRI reassuring with no evidence of new infarct.  There are some " psychiatric and psychosocial aspects to his presentation that I am having difficulty addressing.  Specifically the patient complains of severe anxiety regarding his financial situation and claims he cannot go back to work because his car is broken and he does not have the funds to repair.    Plan:  1.  Continue aspirin, Plavix, statin  2.  I asked him to monitor his blood pressure closely especially when symptomatic  3.  Magnesium/riboflavin supplementation for 30 days for suspected component of vestibular migraine  4.  Psychiatry consult if available.  Patient complains of persistent severe anxiety and depressive symptoms despite reported compliance with Viibryd    From a neurology standpoint would be cleared for discharge today.

## 2023-09-03 NOTE — PLAN OF CARE
Goal Outcome Evaluation:   Patient admitted for dizziness.   Outcome summary: Patient stable overnight. AO x4, room air, up ad chinyere, NSR. Patient has not reported any dizziness, but stated that dizziness has been intermittent when he first got to the ER. Stroke about 2 weeks ago. No new infarct on MRI. Regular diet. Dr. Leon to check MRI and follow up with patient today.

## 2023-09-03 NOTE — PLAN OF CARE
Goal Outcome Evaluation:      Pt currently denies dizziness, though admits to anxiety and depression 10/10.  No thoughts of self harm at this time.   BP better controlled today.  Awaiting psychiatry consult in the AM.                   No

## 2023-09-03 NOTE — THERAPY DISCHARGE NOTE
[facility]  [facility]  [phone]    ACCESS CENTER STATEMENT OF DISPOSITION        I, Flako Coreas, was assessed in the [facility] at [facility] on 9/3/2023.  I understand the recommendations below and what follow-up action is expected of me.    Outpt Counseling:    -ELVIE Hunt  [phone]  [phone]    [facility]  [phone]  [address] [address]    -[facility] [phone]  [phone]                ________________________________  Patient/Parent/Guardian/POA Signature    ________________________________  Clinician Signature    9/3/2023  17:14 EDT

## 2023-09-04 VITALS
OXYGEN SATURATION: 98 % | TEMPERATURE: 98.2 F | SYSTOLIC BLOOD PRESSURE: 145 MMHG | BODY MASS INDEX: 28.88 KG/M2 | WEIGHT: 225 LBS | HEART RATE: 82 BPM | RESPIRATION RATE: 17 BRPM | HEIGHT: 74 IN | DIASTOLIC BLOOD PRESSURE: 84 MMHG

## 2023-09-04 LAB
ANION GAP SERPL CALCULATED.3IONS-SCNC: 13 MMOL/L (ref 5–15)
BUN SERPL-MCNC: 24 MG/DL (ref 6–20)
BUN/CREAT SERPL: 23.3 (ref 7–25)
CALCIUM SPEC-SCNC: 9.1 MG/DL (ref 8.6–10.5)
CHLORIDE SERPL-SCNC: 107 MMOL/L (ref 98–107)
CO2 SERPL-SCNC: 19 MMOL/L (ref 22–29)
CREAT SERPL-MCNC: 1.03 MG/DL (ref 0.76–1.27)
DEPRECATED RDW RBC AUTO: 41.7 FL (ref 37–54)
EGFRCR SERPLBLD CKD-EPI 2021: 85.3 ML/MIN/1.73
ERYTHROCYTE [DISTWIDTH] IN BLOOD BY AUTOMATED COUNT: 14.5 % (ref 12.3–15.4)
GLUCOSE SERPL-MCNC: 107 MG/DL (ref 65–99)
HCT VFR BLD AUTO: 44.3 % (ref 37.5–51)
HGB BLD-MCNC: 14.6 G/DL (ref 13–17.7)
MCH RBC QN AUTO: 26.5 PG (ref 26.6–33)
MCHC RBC AUTO-ENTMCNC: 33 G/DL (ref 31.5–35.7)
MCV RBC AUTO: 80.4 FL (ref 79–97)
PLATELET # BLD AUTO: 308 10*3/MM3 (ref 140–450)
PMV BLD AUTO: 9.6 FL (ref 6–12)
POTASSIUM SERPL-SCNC: 4.1 MMOL/L (ref 3.5–5.2)
RBC # BLD AUTO: 5.51 10*6/MM3 (ref 4.14–5.8)
SODIUM SERPL-SCNC: 139 MMOL/L (ref 136–145)
WBC NRBC COR # BLD: 8.13 10*3/MM3 (ref 3.4–10.8)

## 2023-09-04 PROCEDURE — 25010000002 DIPHENHYDRAMINE PER 50 MG: Performed by: STUDENT IN AN ORGANIZED HEALTH CARE EDUCATION/TRAINING PROGRAM

## 2023-09-04 PROCEDURE — 25010000002 METOCLOPRAMIDE PER 10 MG: Performed by: STUDENT IN AN ORGANIZED HEALTH CARE EDUCATION/TRAINING PROGRAM

## 2023-09-04 PROCEDURE — 96375 TX/PRO/DX INJ NEW DRUG ADDON: CPT

## 2023-09-04 PROCEDURE — G0378 HOSPITAL OBSERVATION PER HR: HCPCS

## 2023-09-04 PROCEDURE — 80048 BASIC METABOLIC PNL TOTAL CA: CPT | Performed by: PHYSICIAN ASSISTANT

## 2023-09-04 PROCEDURE — 85027 COMPLETE CBC AUTOMATED: CPT | Performed by: PHYSICIAN ASSISTANT

## 2023-09-04 RX ORDER — DIPHENHYDRAMINE HYDROCHLORIDE 50 MG/ML
12.5 INJECTION INTRAMUSCULAR; INTRAVENOUS ONCE
Status: COMPLETED | OUTPATIENT
Start: 2023-09-04 | End: 2023-09-04

## 2023-09-04 RX ORDER — LORAZEPAM 0.5 MG/1
0.5 TABLET ORAL 3 TIMES DAILY PRN
Status: DISCONTINUED | OUTPATIENT
Start: 2023-09-04 | End: 2023-09-04 | Stop reason: HOSPADM

## 2023-09-04 RX ORDER — METOCLOPRAMIDE HYDROCHLORIDE 5 MG/ML
10 INJECTION INTRAMUSCULAR; INTRAVENOUS ONCE
Status: COMPLETED | OUTPATIENT
Start: 2023-09-04 | End: 2023-09-04

## 2023-09-04 RX ADMIN — HYDROXYZINE HYDROCHLORIDE 25 MG: 25 TABLET ORAL at 08:14

## 2023-09-04 RX ADMIN — SPIRONOLACTONE 25 MG: 25 TABLET ORAL at 08:14

## 2023-09-04 RX ADMIN — Medication 10 ML: at 08:15

## 2023-09-04 RX ADMIN — Medication 400 MG: at 12:01

## 2023-09-04 RX ADMIN — METOCLOPRAMIDE 10 MG: 5 INJECTION, SOLUTION INTRAMUSCULAR; INTRAVENOUS at 01:34

## 2023-09-04 RX ADMIN — HYDROCHLOROTHIAZIDE 12.5 MG: 12.5 TABLET ORAL at 08:14

## 2023-09-04 RX ADMIN — CLOPIDOGREL BISULFATE 75 MG: 75 TABLET, FILM COATED ORAL at 08:14

## 2023-09-04 RX ADMIN — DIPHENHYDRAMINE HYDROCHLORIDE 12.5 MG: 50 INJECTION, SOLUTION INTRAMUSCULAR; INTRAVENOUS at 01:34

## 2023-09-04 RX ADMIN — HYDRALAZINE HYDROCHLORIDE 100 MG: 50 TABLET, FILM COATED ORAL at 05:14

## 2023-09-04 RX ADMIN — MAGNESIUM OXIDE 400 MG (241.3 MG MAGNESIUM) TABLET 400 MG: TABLET at 08:14

## 2023-09-04 RX ADMIN — LISINOPRIL 40 MG: 20 TABLET ORAL at 08:14

## 2023-09-04 RX ADMIN — HYDRALAZINE HYDROCHLORIDE 100 MG: 50 TABLET, FILM COATED ORAL at 13:46

## 2023-09-04 RX ADMIN — VILAZODONE HYDROCHLORIDE 40 MG: 40 TABLET, FILM COATED ORAL at 08:14

## 2023-09-04 RX ADMIN — ASPIRIN 81 MG: 81 TABLET, COATED ORAL at 08:14

## 2023-09-04 NOTE — PLAN OF CARE
Goal Outcome Evaluation:      Patient admitted to the observation unit for treatment for dizziness. Case management and Psych following. Vss. Will continue to monitor for any changes

## 2023-09-04 NOTE — CONSULTS
IDENTIFYING INFORMATION: The patient is a 58-year-old white male admitted with increased anxiety and dizziness.    CHIEF COMPLAINT: Anxiety    INFORMANT: Patient and chart    RELIABILITY: Good    HISTORY OF PRESENT ILLNESS: The patient is a 58-year-old white male admitted with increasing dizziness.  He is seen by psychiatry related to complaints of anxiety.  The patient reports that he recently had a cerebrovascular accident and has had worsened anxiety since.  The patient reports that he has been on Viibryd for about a year and feels as though it has been helpful for him but reports recent life events including his stroke, loss of job and car have caused worsening anxiety.  He denies current suicidal or homicidal ideation denies prior suicide attempts or gestures.  He reports sporadic alcohol use as well as sporadic cannabis use.  He lives alone.    PAST PSYCHIATRIC HISTORY: The patient is been on Viibryd for approximately 1 year and states that it has been helpful.    PAST MEDICAL HISTORY: Significant for history of GERD, hypertension, hyperlipidemia, recent acute CVA    MEDICATIONS:   Current Facility-Administered Medications   Medication Dose Route Frequency Provider Last Rate Last Admin    acetaminophen (TYLENOL) tablet 650 mg  650 mg Oral Q4H PRN Lashell Perez PA        amLODIPine (NORVASC) tablet 10 mg  10 mg Oral Nightly Lashell Perez PA   10 mg at 09/03/23 2119    aspirin EC tablet 81 mg  81 mg Oral Daily Lashell Perez PA   81 mg at 09/04/23 0814    atorvastatin (LIPITOR) tablet 40 mg  40 mg Oral Nightly Lashell Perez PA   40 mg at 09/03/23 2119    clopidogrel (PLAVIX) tablet 75 mg  75 mg Oral Daily Lashell Perez PA   75 mg at 09/04/23 0814    hydrALAZINE (APRESOLINE) tablet 100 mg  100 mg Oral Q8H Lashell Perez PA   100 mg at 09/04/23 1346    hydroCHLOROthiazide (HYDRODIURIL) tablet 12.5 mg  12.5 mg Oral Daily Lashell Perez PA   12.5 mg at 09/04/23 0814    lisinopril  (PRINIVIL,ZESTRIL) tablet 40 mg  40 mg Oral Daily Lashell Perez PA   40 mg at 09/04/23 0814    LORazepam (ATIVAN) tablet 0.5 mg  0.5 mg Oral TID PRN Teja Garrido III, MD        magnesium oxide (MAG-OX) tablet 400 mg  400 mg Oral Daily Pan Leon MD   400 mg at 09/04/23 0814    nitroglycerin (NITROSTAT) SL tablet 0.4 mg  0.4 mg Sublingual Q5 Min PRN Lashell Perez PA        ondansetron (ZOFRAN) tablet 4 mg  4 mg Oral Q6H PRN Lashell Perez PA   4 mg at 09/03/23 2119    Or    ondansetron (ZOFRAN) injection 4 mg  4 mg Intravenous Q6H PRN Lashell Perez PA        sodium chloride 0.9 % flush 10 mL  10 mL Intravenous Q12H Lashell Perez PA   10 mL at 09/04/23 0815    sodium chloride 0.9 % flush 10 mL  10 mL Intravenous PRN Lashell Perez PA        sodium chloride 0.9 % infusion 40 mL  40 mL Intravenous PRN Lashell Perez PA        spironolactone (ALDACTONE) tablet 25 mg  25 mg Oral Daily Lashell Perez PA   25 mg at 09/04/23 0814    vilazodone (VIIBRYD) tablet 40 mg  40 mg Oral Daily Lashell Perez PA   40 mg at 09/04/23 0814    Vitamin B-2 (RIBOFLAVIN) tablet 400 mg  400 mg Oral Daily Pan Leon MD   400 mg at 09/04/23 1201         ALLERGIES: None    FAMILY HISTORY: Noncontributory    SOCIAL HISTORY: 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  He reports occasional use of cannabis and alcohol.  He reports that his use of these substances has never been problematic.    MENTAL STATUS EXAM: Patient is a well-developed well-nourished white male appearing his stated age.  He is in no apparent physical distress at the time of examination.  He is awake alert and oriented all spheres.  His mood is mildly dysphoric his affect congruent.  Speech is relevant and coherent.  There are no gross deficits in memory or cognition noted.  Intelligence is judged to be in the average range based on fund of  knowledge, the patient's wife's interview.  He denies current suicidal or homicidal ideation psychotic features.  Judgement and insight are intact.    ASSETS/LIABILITIES: Motivation for change/health issues    DIAGNOSTIC IMPRESSION: Major peps disorder recurrent moderate, anxiety disorder unspecified, medical problems as noted previously    PLAN: I will discontinue hydroxyzine which the patient states has been ineffectual in addressing his anxiety symptoms and start him on low-dose as needed lorazepam.  I have discussed the potential of addiction to this medication with the patient.  He will continue on Viibryd.  From a psych standpoint he is stable for discharge.

## 2023-09-04 NOTE — SIGNIFICANT NOTE
09/04/23 0948   OTHER   Discipline physical therapist   Rehab Time/Intention   Session Not Performed other (see comments)  (Independent and up ad chinyere per chart review. PT evaluation not appropriate at this time. Will sign off. Notifed RN via Epic secure chat.)

## 2023-09-04 NOTE — NURSING NOTE
"Pt asked this RN to see if there was something that Dr Garrido could prescribe in place of Ativan since the MD would not prescribe it nor could he get a few days worth until he could get in with his PCP. Paged Dr Garrido but have not heard back. RIVKA VERNON spoke to Dr Gordon and Dr Flores regarding request and neither will prescribe ativan. Pt stated \" he did not come here for drugs, but that is how he is being treated and he does not understand.\" Pt came to be treated for his anxiety since having a recent CVA. Pt called son, signed his paperwork and walked out with Duc from security to the ER to await his ride.   "

## 2023-09-04 NOTE — PLAN OF CARE
Goal Outcome Evaluation:  Plan of Care Reviewed With: patient        Problem: Adult Inpatient Plan of Care  Goal: Plan of Care Review  Outcome: Met  Flowsheets (Taken 9/4/2023 2282)  Progress: improving  Plan of Care Reviewed With: patient  Goal: Patient-Specific Goal (Individualized)  Outcome: Met  Goal: Absence of Hospital-Acquired Illness or Injury  Outcome: Met  Goal: Optimal Comfort and Wellbeing  Outcome: Met  Goal: Readiness for Transition of Care  Outcome: Met     Problem: Hypertension Comorbidity  Goal: Blood Pressure in Desired Range  Outcome: Met     Problem: Adjustment to Illness (Stroke, Ischemic/Transient Ischemic Attack)  Goal: Optimal Coping  Outcome: Met     Problem: Cerebral Tissue Perfusion (Stroke, Ischemic/Transient Ischemic Attack)  Goal: Optimal Cerebral Tissue Perfusion  Outcome: Met     Problem: Functional Ability Impaired (Stroke, Ischemic/Transient Ischemic Attack)  Goal: Optimal Functional Ability  Outcome: Met     Problem: Fall Injury Risk  Goal: Absence of Fall and Fall-Related Injury  Outcome: Met     Progress: improving     Pt has had an essential negative neurological work up, a negative cardiac work up and has seen access to address his anxiety issues. Pt to be discharged home and to follow up with PCP. Pt requesting another MD. PT instructed that if unhappy with his care that he may sign back in to the ER, but the ER providers will not prescribe 30 days worth of ativan. Discharge papers provided.

## 2023-09-04 NOTE — PROGRESS NOTES
[facility] follow up d/t anxiety; this writer reviewed chart and spoke with RN Bladimir. Per RN, patient feeling better, less depressed; he/patient slept overnight.  Psychiatrist, Dr. Garrido, consult ordered for today; patient has outpatient counseling resources. No further needs/concerns noted at this time per RN and/or medical team; [facility] to continue following.

## 2023-09-04 NOTE — PROGRESS NOTES
MD ATTESTATION NOTE    The DON and I have discussed this patient's history, physical exam, and treatment plan.  I have reviewed the documentation and personally had a face to face interaction with the patient. I affirm the documentation and agree with the treatment and plan.  The attached note describes my personal findings.      I provided a substantive portion of the care of the patient.  I personally performed the physical exam in its entirety, and below are my findings.       Brief HPI: Patient with history of cerebellar infarct admitted for some recurrent dizziness.  Patient also with some issues with anxiety.  Patient's neurologic symptoms have improved significantly as has anxiety.    PHYSICAL EXAM  ED Triage Vitals   Temp Heart Rate Resp BP SpO2   09/02/23 1058 09/02/23 1058 09/02/23 1113 09/02/23 1103 09/02/23 1058   99.5 °F (37.5 °C) 89 18 (!) 164/104 96 %      Temp src Heart Rate Source Patient Position BP Location FiO2 (%)   09/02/23 1058 09/02/23 1058 09/02/23 1113 09/02/23 1113 --   Tympanic Monitor Sitting Left arm          GENERAL: no acute distress  HENT: nares patent  EYES: no scleral icterus  CV: regular rhythm, normal rate  RESPIRATORY: normal effort  ABDOMEN: soft  MUSCULOSKELETAL: no deformity  NEURO: alert, moves all extremities, follows commands  PSYCH:  calm, cooperative  SKIN: warm, dry    Vital signs and nursing notes reviewed.        Plan: Neuro consulted.  Imaging reassuring.  Dizziness resolved.  Psych consult pending for anxiety.

## 2023-09-04 NOTE — NURSING NOTE
Dr Garrido recommends Ativan low dose PO, but does not prescribe medications. Pt was told by RIVKA VERNON that he would need to follow up with his PCP to have medication prescribed. Pt became irate and was cussing and asking to see another MD. This [place] called maico ([place]) and recommends having security to accompany for discharge process. Security notified.

## 2023-09-04 NOTE — CASE MANAGEMENT/SOCIAL WORK
Consult received for patient's financial concerns. Spoke with patient at bedside. He states that he lost his job on July 14th and has been struggling financially since. He reports being 1 month behind on his rent. He has applied for unemployment, but was denied. He states that his denial is in the appeals process. He verbalizes support from his aunt, who is going to help him back-pay last month's rent. I explained to him the [facility]  referral process, and the patient is agreeable to [facility]  referrals being placed on financial assistance, particularly rent assistance. CRISTHIAN Rojas, POOJAN RN

## 2023-09-04 NOTE — PROGRESS NOTES
ED OBSERVATION PROGRESS/DISCHARGE SUMMARY    Date of Admission: 9/2/2023   LOS: 0 days   PCP: Akash Rodriguez Jr., DO      Subjective:   Denies any neuro complaints at this time.  However, he remains very anxious.  He is not happy about having to go to primary care physician for recommendations.  Patient is also not happy that the hospital does not have a blood pressure cuff to give him to monitor his blood pressure.    Hospital Outcome:   58-year-old male with recent acute cerebellar CVA who presents to [facility] ER with dizziness and difficulty ambulating.  In the ER, laboratory evaluation largely within normal limits the exception of a glucose at 112.  In the ER patient was evaluated by Dr. Colby with neurology recommending further evaluation with MRI.     MRI of the brain without revealed no new or abnormality when compared to prior MRI 13 days ago with subacute infarcts in the medial left cerebellum and posterior left cerebellum, there is note of minimal small vessel disease and minimal sinus disease.     Due to patient's ongoing anxiety, depression, and panic, [facility] was consulted.  Patient was seen by  and they recommend psychiatry to follow.  Psychiatry was not able to be here until today therefore patient was kept overnight.      Patient was seen by Dr. Garrido.  He recommends Ativan 0.5 mg p.o. 3 times daily, however, does not write prescriptions due to he is a consulting physician.  Spoke with Dr. Gordon [place] physician, he states due to the medication being high risk for abuse, and patient has not kept appointments with primary care this year, would be a risk to prescribe Ativan for the patient at discharge.  Will defer to primary care for Ativan prescription.    ROS:  General: no fevers, chills  Respiratory: no cough, dyspnea  Cardiovascular: no chest pain, palpitations  Abdomen: No abdominal pain, nausea, vomiting, or diarrhea  Neurologic: No focal  weakness    Objective   Physical Exam:  I have reviewed the vital signs.  Temp:  [98 °F (36.7 °C)-99 °F (37.2 °C)] 98.4 °F (36.9 °C)  Heart Rate:  [65-88] 67  Resp:  [16-18] 16  BP: (117-155)/(65-94) 118/65  General Appearance:    Alert, cooperative, no distress  Head:    Normocephalic, atraumatic  Eyes:    Sclerae anicteric  Neck:   Supple, no mass  Lungs: Clear to auscultation bilaterally, respirations unlabored  Heart: Regular rate and rhythm, S1 and S2 normal, no murmur, rub or gallop  Abdomen:  Soft, non-tender, bowel sounds active, nondistended  Extremities: No clubbing, cyanosis, or edema to lower extremities  Pulses:  2+ and symmetric in distal lower extremities  Skin: No rashes   Neurologic: Oriented x3, Normal strength to extremities    Results Review:    I have reviewed the labs, radiology results and diagnostic studies.    Results from last 7 days   Lab Units 09/03/23  0550   WBC 10*3/mm3 8.14   HEMOGLOBIN g/dL 14.8   HEMATOCRIT % 44.1   PLATELETS 10*3/mm3 304     Results from last 7 days   Lab Units 09/03/23  0550 09/02/23  1159   SODIUM mmol/L 139 143   POTASSIUM mmol/L 4.2 4.2   CHLORIDE mmol/L 106 105   CO2 mmol/L 20.6* 23.0   BUN mg/dL 19 19   CREATININE mg/dL 0.91 0.89   CALCIUM mg/dL 9.2 9.9   BILIRUBIN mg/dL  --  0.4   ALK PHOS U/L  --  71   ALT (SGPT) U/L  --  27   AST (SGOT) U/L  --  18   GLUCOSE mg/dL 104* 112*     Imaging Results (Last 24 Hours)       ** No results found for the last 24 hours. **            I have reviewed the medications.  ---------------------------------------------------------------------------------------------  Assessment & Plan   Assessment/Problem List    Dizziness      Plan:    Dizziness  Ataxia  History of left inferior cerebellar stroke  -Repeat MRI brain without contrast shows no new abnormality and compared to previous MRI 13 days ago  -Consult neurology, Dr. Leon-kelly to discharge patient home  -Ambulatory referral to physical therapy, patient was recently  discharged to rehab and left after 8 hours due to conditions at the facility that he was unhappy with     Anxiety and depression  -Continue hydroxyzine as needed  -Continue Viibryd  -Patient reports he has been off his Viibryd for approximately 1 month  -Access center consulted, they have recommended psychiatrist to follow  -Dr. Garrido, has seen the patient and recommendations have been made.  Patient to follow-up with primary care for his recommendations.     Hypertension  -Patient is hypertensive today with diastolic pressure above 100  -Patient has had his hydrochlorothiazide, lisinopril, spironolactone today, continue  -Continue hydralazine every 8 hours  -Continue amlodipine at nighttime  -Continue to monitor       Disposition: Home    Follow-up after Discharge: Primary care 1 week    This note will serve as discharge summary      55 minutes have been spent by [facility] providers in the care of this patient while under observation status.    Appropriate PPE worn during patient encounter.  Hand hygeine performed before and after seeing the patient.      Mary Mccray PA-C 09/04/23 04:45 EDT

## 2023-09-04 NOTE — DISCHARGE INSTRUCTIONS
Follow-up with your primary care physician this week.   Please call and make an appointment for hospital follow-up as well as to follow-up with Dr. Sethi  recommendation for Ativan for anxiety.  Go to your nearest [facility], [facility], dizziness with automated blood pressure cuff and take your blood pressure (at the same machine) 2-3 times weekly.  Or purchase a blood pressure cuff for home and take it daily.  Please make a log of your blood pressures and take it to your primary care physician at your next appointment.

## 2023-09-05 ENCOUNTER — TRANSITIONAL CARE MANAGEMENT TELEPHONE ENCOUNTER (OUTPATIENT)
Dept: CALL CENTER | Facility: HOSPITAL | Age: 58
End: 2023-09-05
Payer: COMMERCIAL

## 2023-09-05 ENCOUNTER — TELEPHONE (OUTPATIENT)
Dept: NEUROLOGY | Facility: CLINIC | Age: 58
End: 2023-09-05
Payer: COMMERCIAL

## 2023-09-05 ENCOUNTER — READMISSION MANAGEMENT (OUTPATIENT)
Dept: CALL CENTER | Facility: HOSPITAL | Age: 58
End: 2023-09-05
Payer: COMMERCIAL

## 2023-09-05 NOTE — CASE MANAGEMENT/SOCIAL WORK
Discharge Planning Assessment  [facility]     Patient Name: Flako Coreas  MRN: [id number]  Today's Date: 9/5/2023    Admit Date: 9/2/2023        Discharge Needs Assessment    No documentation.                  Discharge Plan       Row Name 09/05/23 1302       Plan    Plan Comments Referral sent to [facility] re assistance w/ rent per documentation from DEL Araujo RN                  Continued Care and Services - Discharged on 9/4/2023 Admission date: 9/2/2023 - Discharge disposition: Home or Self Care   Coordination has not been started for this encounter.       Selected Continued Care - Prior Encounters Includes continued care and service providers with selected services from prior encounters from 6/4/2023 to 9/4/2023      Discharged on 8/24/2023 Admission date: 8/19/2023 - Discharge disposition: Skilled Nursing Facility (DC - External)      Destination       Service Provider Selected Services Address Phone Fax Patient Preferred    [facility] [address] [facility] [id number] [phone] --                          Expected Discharge Date and Time       Expected Discharge Date Expected Discharge Time    Sep 4, 2023            Demographic Summary    No documentation.                  Functional Status    No documentation.                  Psychosocial    No documentation.                  Abuse/Neglect    No documentation.                  Legal    No documentation.                  Substance Abuse    No documentation.                  Patient Forms    No documentation.                     Henny Castillo RN

## 2023-09-05 NOTE — OUTREACH NOTE
Prep Survey      Flowsheet Row Responses   [facility] facility patient discharged from? [place]   Is LACE score < 7 ? Yes   Eligibility [facility]   Hospital EMILY   Date of Admission 09/02/23   Date of Discharge 09/04/23   Discharge Disposition Home or Self Care   Discharge diagnosis *Dizziness   Does the patient have one of the following disease processes/diagnoses(primary or secondary)? Other   Does the patient have Home health ordered? No   Is there a DME ordered? No   Prep survey completed? Yes            Vilma CANCINO - Registered Nurse

## 2023-09-05 NOTE — OUTREACH NOTE
Call Center TCM Note      Flowsheet Row Responses   [facility] patient discharged from? [place]   Does the patient have one of the following disease processes/diagnoses(primary or secondary)? Other   TCM attempt successful? Yes  [ VR]   Call start time 1235   Call end time 1243   Discharge diagnosis *Dizziness   Meds reviewed with patient/caregiver? Yes   Does the patient have all medications ordered at discharge? N/A   Comments 9/15/2023  1:40 PM  HOSPITAL FOLLOW UP PCP and Cardio f/u 23   Does the patient have an appointment with their PCP within 7-14 days of discharge? Yes   Comments Pt c/o dizziness/sweating, blurred vision/palpitations/racing/fast,+nausea last night lasting for approx 30min.He denies LOC or fall last night.He reports that he feels better today. He does have an Apple watch but did not have it on last night to monitor any VS. He does not have BP monitor at home. I advised that he return to ER if he has symptoms again, advised him to call MD today but he would rather not.   Did the patient receive a copy of their discharge instructions? Yes   What is the patient's perception of their health status since discharge? Same   Is the patient/caregiver able to teach back signs and symptoms related to disease process for when to call PCP? Yes   Is the patient/caregiver able to teach back the hierarchy of who to call/visit for symptoms/problems? PCP, Specialist, Home health nurse, Urgent Care, ED, 911 Yes   If the patient is a current smoker, are they able to teach back resources for cessation? Not a smoker   TCM call completed? Yes   Call end time 1243   Is the patient interested in additional calls from an ambulatory ? No            Sarah Saenz, THEA    2023, 12:46 EDT

## 2023-09-05 NOTE — TELEPHONE ENCOUNTER
"Received call from Tricia at the [facility] stating that patient told her he thinks that he had another stroke last night.  She states that the patient told her he \"was sweaty and fell\" last night.     Call was transferred.  Spoke with patient, advised that he seek immediate medical attention if he is experiencing stroke-like symptoms as we cannot diagnose a new stroke over the phone.  He states \"I just want to get my appointment moved up.\"  Checked for a sooner appointment, none available at this time.  Offered to place patient on the wait list if  sooner appointment comes available.  Patient became agitated, and ended the call abruptly.  "

## 2023-09-06 ENCOUNTER — TELEPHONE (OUTPATIENT)
Dept: NEUROLOGY | Facility: CLINIC | Age: 58
End: 2023-09-06
Payer: COMMERCIAL

## 2023-09-06 NOTE — TELEPHONE ENCOUNTER
Patient called today stating that he thinks he had another stroke.  Advised that he call 911 and present to the ED as we cannot assess him properly over the telephone.  He voiced understanding.

## 2023-09-08 ENCOUNTER — OFFICE VISIT (OUTPATIENT)
Dept: CARDIOLOGY | Facility: CLINIC | Age: 58
End: 2023-09-08
Payer: COMMERCIAL

## 2023-09-08 VITALS
HEART RATE: 69 BPM | HEIGHT: 74 IN | SYSTOLIC BLOOD PRESSURE: 130 MMHG | BODY MASS INDEX: 29.49 KG/M2 | OXYGEN SATURATION: 97 % | DIASTOLIC BLOOD PRESSURE: 82 MMHG | WEIGHT: 229.8 LBS

## 2023-09-08 DIAGNOSIS — I63.9 CEREBELLAR STROKE: Primary | ICD-10-CM

## 2023-09-08 DIAGNOSIS — R06.09 DOE (DYSPNEA ON EXERTION): ICD-10-CM

## 2023-09-08 DIAGNOSIS — R42 DIZZINESS: ICD-10-CM

## 2023-09-08 DIAGNOSIS — R27.0 ATAXIA: ICD-10-CM

## 2023-09-08 DIAGNOSIS — E78.2 MIXED HYPERLIPIDEMIA: Chronic | ICD-10-CM

## 2023-09-08 DIAGNOSIS — G47.10 HYPERSOMNOLENCE: ICD-10-CM

## 2023-09-08 DIAGNOSIS — I16.1 HYPERTENSIVE EMERGENCY: ICD-10-CM

## 2023-09-08 PROBLEM — I10 ESSENTIAL HYPERTENSION: Chronic | Status: RESOLVED | Noted: 2017-06-26 | Resolved: 2023-09-08

## 2023-09-08 RX ORDER — CARVEDILOL 6.25 MG/1
6.25 TABLET ORAL 2 TIMES DAILY WITH MEALS
COMMUNITY

## 2023-09-08 NOTE — PATIENT INSTRUCTIONS
Keep Blood pressure log with goal of -140 and DBP  60-90, contact office if multiple readings out of range    Limit salt to < 3 mg daily per AHA guidelines

## 2023-09-08 NOTE — PROGRESS NOTES
CARDIOLOGY    Date of Office Visit: 2023  Patient Name: Flako Coreas  : 1965  Encounter Provider: Wayne Avalos PA-C  Primary Cardiologist: Chinmay Taylor MD    CHIEF COMPLAINT / REASON FOR OFFICE VISIT     2-week hospital follow-up    HISTORY OF PRESENT ILLNESS     This is a 58 y.o. year old male who presents to [facility] CARDIOLOGY for a for  follow up from their recent inpatient hospitalization.    The patient presented to the emergency room on 2023 and was discharged home from rehab on 2023 with complaints of dizziness and ataxia.  He was seen to have numerous small foci of acute infarction in the left cerebral hemisphere.  Cardiology saw the patient in the hospital and completed a MEGHANN that did not show any intra-atrial shunt or left atrial thrombus.  He was discharged home on dual 10 type platelet therapy for 21 days and recommendations to continue aspirin monotherapy indefinitely.  He was recommended to complete a 2-week Zio patch as well as obstructive sleep apnea evaluation at discharge.  He was sent to vestibular rehab and was discharged from their service on 2023.    He had difficulty with completing a Zio patch.  The monitor only ran for 24 hours.  This monitor was supposed to be for 2 weeks.  We will have to repeat this monitor to allow for adequate evaluation if he is having arrhythmias due to his history of a recent cryptogenic stroke.    Today the patient reports he continues to have intermittent ataxia and dizziness.  The symptoms are worse with exertion.  He is also noticing increasing shortness of breath with exertion and overall fatigue.  The symptoms have been progressively worse since his recent stroke.  He had a reevaluation at [facility] 2 days ago.    He is not able to get in with neurology until the end of November despite ongoing symptoms.  He has not heard back from the hospital in regards to his sleep study or vestibular rehab as was  "promised to him on discharge.  I will help assist and reorder and arrange for him to receive the services.    He has a new blood pressure cuff that he received today.  He will start taking home blood pressure readings.    PMHx: Mixed hyperlipidemia, essential hypertension, GERD with barrettes esophagus, mixed anxiety/depressive disorder, left cerebellar stroke, ADHD      REVIEW OF SYSTEMS   Review of Systems   Constitutional: Positive for malaise/fatigue. Negative for weight gain and weight loss.   Cardiovascular:  Positive for dyspnea on exertion. Negative for leg swelling, near-syncope and palpitations.   Neurological:  Positive for dizziness, light-headedness and weakness.     PHYSICAL EXAMINATION     Vital Signs:  /82 (BP Location: Left arm, Patient Position: Sitting, Cuff Size: Adult)   Pulse 69   Ht 188 cm (74\")   Wt 104 kg (229 lb 12.8 oz)   SpO2 97%   BMI 29.50 kg/m²   Estimated body mass index is 29.5 kg/m² as calculated from the following:    Height as of this encounter: 188 cm (74\").    Weight as of this encounter: 104 kg (229 lb 12.8 oz).             Physical Exam  Constitutional:       Appearance: Normal appearance.   HENT:      Head: Normocephalic and atraumatic.   Cardiovascular:      Rate and Rhythm: Normal rate and regular rhythm.      Pulses: Normal pulses.      Heart sounds: Normal heart sounds.   Pulmonary:      Effort: Pulmonary effort is normal.      Breath sounds: Normal breath sounds.   Musculoskeletal:      Right lower leg: No edema.      Left lower leg: No edema.   Skin:     General: Skin is warm and dry.   Neurological:      General: No focal deficit present.      Mental Status: He is alert and oriented to person, place, and time.        Cardiac Testing/Results     Cardiac Testing:   -MEGHANN 8/22/2023: EF 70% with mild to moderate LVH.  Grade 1 diastolic dysfunction.  Mildly dilated left atrium.  No evidence of thrombus.  Negative saline test.  Mild plaques in the descending " thoracic aorta.    -Echocardiogram 8/17/2023: EF 66 to 70%, mild LVH.  Grade 1 diastolic dysfunction.  Mildly dilated left atrium.  Negative bubble study.    Result Review :  The following data was reviewed by: Wayne Avalos PA-C on 09/08/2023:    Lipid Panel          8/16/2023    14:58 8/20/2023    06:12   Lipid Panel   Total Cholesterol 194  117    Triglycerides 194  115    HDL Cholesterol 41  34    VLDL Cholesterol 34  21    LDL Cholesterol  119  62    LDL/HDL Ratio 2.79  1.76       Lab Results   Component Value Date     09/04/2023     09/03/2023    K 4.1 09/04/2023    K 4.2 09/03/2023     09/04/2023     09/03/2023    CO2 19.0 (L) 09/04/2023    CO2 20.6 (L) 09/03/2023    BUN 24 (H) 09/04/2023    BUN 19 09/03/2023    CREATININE 1.03 09/04/2023    CREATININE 0.91 09/03/2023    EGFRIFNONA 98 11/05/2018    EGFRIFNONA 63 01/01/2018    EGFRIFAFRI >60 09/28/2018    EGFRIFAFRI >60 09/26/2018    GLUCOSE 107 (H) 09/04/2023    GLUCOSE 104 (H) 09/03/2023    CALCIUM 9.1 09/04/2023    CALCIUM 9.2 09/03/2023    ALBUMIN 4.8 09/02/2023    ALBUMIN 3.8 08/20/2023    AST 18 09/02/2023    AST 9 08/20/2023    ALT 27 09/02/2023    ALT 17 08/20/2023     Lab Results   Component Value Date    WBC 9.41 09/06/2023    WBC 8.13 09/04/2023    HGB 14.8 09/06/2023    HGB 14.6 09/04/2023    HCT 45.4 09/06/2023    HCT 44.3 09/04/2023    MCV 79.9 (L) 09/06/2023    MCV 80.4 09/04/2023     09/06/2023     09/04/2023     Lab Results   Component Value Date    PROBNP 65.5 08/16/2023    BNP 50.8 02/12/2019    BNP 29.2 09/18/2018     Lab Results   Component Value Date    CKTOTAL 523 (H) 11/19/2021    TROPONINI <0.010 08/27/2022    TROPONINT 8 08/19/2023     Lab Results   Component Value Date    TSH 0.364 08/20/2023    TSH 0.288 08/18/2023         Data reviewed : Recent hospitalization notes rehab discharge note from 9/4/2023, hospitalization from 8/16 and 8/19                 ASSESSMENT & PLAN       Diagnoses  and all orders for this visit:    1. Cerebellar stroke (Primary)  -     Basic Vestibular Evaluation; Future  -     Holter Monitor - 72 Hour Up To 15 Days; Future  Initially diagnosed back in August 2023.  Cryptogenic.  MEGHANN was negative for interatrial shunt and thrombus.  Seen on MRI of the head and stable on recent testing for numerous small foci of acute infarction in the left cerebral hemisphere.  Zio patch was only working for 24 hours.  We will repeat for 2 weeks.  If you are still concern for possible thromboembolic causes he could be a candidate for loop recorder for long-term monitoring pending monitor results.  Continue Plavix for 21 days postacute stroke.  Then indefinitely aspirin and atorvastatin.  Plan on sleep study to evaluate for obstructive sleep apnea  2. Mixed hyperlipidemia  Goal LDL is now less than 70.  Was elevated on recent labs that atorvastatin is recently been uptitrated.  Continue to monitor with repeat labs in 6 months  3. Hypertensive emergency  He has new blood pressure machine and will start taking home readings.  Today it is controlled in the office.  Continue current regimen of hydrochlorothiazide 12.5 mg daily, carvedilol 6.25 mg twice daily, amlodipine 10 mg daily hydralazine 100 mg 3 times per day, spironolactone 25 mg daily and lisinopril 40 mg daily.  Previous testing for renal artery stenosis has been negative  4. Ataxia  -     Basic Vestibular Evaluation; Future  Worsening with exertion, increasing episodes of shortness of breath.  Plan to complete vestibular rehab.  He will need to follow-up with neurology but is not able to see them until November  5. Dizziness  -     Basic Vestibular Evaluation; Future  6.  Dyspnea on exertion and worsening exercise tolerance  Episodes of exertional dizziness and ataxia.  Concern for possible heart blockage.  Plan on a treadmill stress test to evaluate for ischemic cause.      Follow Up:  Return in about 3 months (around 12/8/2023) for  -Routine.  Patient was given instructions and counseling regarding his condition or for health maintenance advice. Please contact office if worsening symptoms or proceed to ER when appropriate.      Wayne Avalos PA-C  09/08/23  16:05 EDT    MEDICATIONS         Discharge Medications            Accurate as of September 8, 2023  4:05 PM. If you have any questions, ask your nurse or doctor.                Continue These Medications        Instructions Start Date   amLODIPine 10 MG tablet  Commonly known as: NORVASC   10 mg, Oral, Daily      Aspirin Low Dose 81 MG EC tablet  Generic drug: aspirin   81 mg, Oral, Daily      atorvastatin 40 MG tablet  Commonly known as: LIPITOR   40 mg, Oral, Nightly      carvedilol 6.25 MG tablet  Commonly known as: COREG   6.25 mg, Oral, 2 Times Daily With Meals      clopidogrel 75 MG tablet  Commonly known as: PLAVIX   75 mg, Oral, Daily      hydrALAZINE 100 MG tablet  Commonly known as: APRESOLINE   100 mg, Oral, Every 8 Hours Scheduled      hydroCHLOROthiazide 12.5 MG tablet  Commonly known as: HYDRODIURIL   12.5 mg, Oral, Daily      hydrOXYzine 25 MG tablet  Commonly known as: ATARAX   25 mg, Oral, 3 Times Daily PRN      lisinopril 40 MG tablet  Commonly known as: PRINIVIL,ZESTRIL   40 mg, Oral, Daily      ondansetron 8 MG tablet  Commonly known as: ZOFRAN   8 mg, Oral, Every 8 Hours PRN      spironolactone 25 MG tablet  Commonly known as: ALDACTONE   25 mg, Oral, Daily      vilazodone 40 MG tablet tablet  Commonly known as: Viibryd   40 mg, Oral, Daily                   **Mariellaon Disclaimer: This note was dictated using an electronic transcription. The electronic translation of spoken language may permit erroneous, or at times, nonsensical words or phrases to be inadvertently transcribed. Although I have reviewed the note for such errors, some may still exist.

## 2023-09-11 ENCOUNTER — TELEPHONE (OUTPATIENT)
Dept: SPORTS MEDICINE | Facility: CLINIC | Age: 58
End: 2023-09-11

## 2023-09-11 NOTE — TELEPHONE ENCOUNTER
Caller: Flako Coreas    Relationship to patient: Self    Best call back number:     Type of visit: HOSPITAL FOLLOW UP    Requested date: 9/18/23     If rescheduling, when is the original appointment: 9/13/23     Additional notes: PT IS HAVING TRANSPORTATION ISSUES    HUB UNABLE TO TRANSFER

## 2023-09-18 ENCOUNTER — OFFICE VISIT (OUTPATIENT)
Dept: SPORTS MEDICINE | Facility: CLINIC | Age: 58
End: 2023-09-18
Payer: COMMERCIAL

## 2023-09-18 VITALS
SYSTOLIC BLOOD PRESSURE: 124 MMHG | DIASTOLIC BLOOD PRESSURE: 78 MMHG | HEART RATE: 90 BPM | HEIGHT: 74 IN | BODY MASS INDEX: 29.13 KG/M2 | RESPIRATION RATE: 16 BRPM | OXYGEN SATURATION: 97 % | WEIGHT: 227 LBS

## 2023-09-18 DIAGNOSIS — F41.8 MIXED ANXIETY DEPRESSIVE DISORDER: ICD-10-CM

## 2023-09-18 DIAGNOSIS — I10 ESSENTIAL HYPERTENSION: ICD-10-CM

## 2023-09-18 DIAGNOSIS — Z86.73 HISTORY OF CVA (CEREBROVASCULAR ACCIDENT): Primary | ICD-10-CM

## 2023-09-18 DIAGNOSIS — E78.2 MIXED HYPERLIPIDEMIA: ICD-10-CM

## 2023-09-18 RX ORDER — SPIRONOLACTONE 25 MG/1
25 TABLET ORAL DAILY
Qty: 90 TABLET | Refills: 1 | Status: SHIPPED | OUTPATIENT
Start: 2023-09-18

## 2023-09-18 RX ORDER — HYDROXYZINE HYDROCHLORIDE 25 MG/1
25 TABLET, FILM COATED ORAL 3 TIMES DAILY PRN
Qty: 90 TABLET | Refills: 0 | Status: SHIPPED | OUTPATIENT
Start: 2023-09-18

## 2023-09-18 RX ORDER — CARVEDILOL 6.25 MG/1
6.25 TABLET ORAL 2 TIMES DAILY WITH MEALS
Qty: 180 TABLET | Refills: 1 | Status: SHIPPED | OUTPATIENT
Start: 2023-09-18

## 2023-09-18 RX ORDER — VILAZODONE HYDROCHLORIDE 40 MG/1
40 TABLET ORAL DAILY
Qty: 90 TABLET | Refills: 1 | Status: SHIPPED | OUTPATIENT
Start: 2023-09-18

## 2023-09-18 RX ORDER — LISINOPRIL 40 MG/1
40 TABLET ORAL DAILY
Qty: 90 TABLET | Refills: 1 | Status: SHIPPED | OUTPATIENT
Start: 2023-09-18

## 2023-09-18 RX ORDER — HYDRALAZINE HYDROCHLORIDE 100 MG/1
100 TABLET, FILM COATED ORAL EVERY 8 HOURS SCHEDULED
Qty: 270 TABLET | Refills: 1 | Status: SHIPPED | OUTPATIENT
Start: 2023-09-18

## 2023-09-18 RX ORDER — ASPIRIN 81 MG/1
81 TABLET ORAL DAILY
Qty: 90 TABLET | Refills: 1 | Status: SHIPPED | OUTPATIENT
Start: 2023-09-18

## 2023-09-18 NOTE — LETTER
September 18, 2023     Patient: Flako Coreas   YOB: 1965   Date of Visit: 9/18/2023       To Whom It May Concern:    It is my medical opinion that Flako Coreas is recovering from a stroke for which he was diagnosed on 8/19/23 which required inpatient admission.    Please contact my office for any questions or concerns.       Sincerely,          SIMRAN Rodriguez Jr., DO    CC:   No Recipients

## 2023-09-18 NOTE — PROGRESS NOTES
"Flako is a 58 y.o. year old male presents to [facility]    Chief Complaint   Patient presents with    Hospital Follow Up Visit     Hospital f/u eval for Dizziness - admission 09/02/2023-09/04/2023 - eval on 09/08/2023 with cardio for cerebral stroke - here for further evaluation and treatment        History of Present Illness  Flako is here for follow-up regarding recent hospital admissions for CVA.  He unfortunately was experiencing symptoms a few days prior to his first admission in August as he was having significant dizziness and inability to move primarily his left side.  When he presented to ER for admission, his blood pressure was dangerously high and he was outside the window for tPA.  He was admitted and neurology, psychiatry were consulted.  Medication management was recommended at that time as well as cardiology for blood pressure optimization.  Medication list is accurate at the time of today's encounter.  He has been taking his medications since his hospitalizations.  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    ED to Hosp-Admission (Discharged) with Barry Thakkar MD; Ijeoma Devries MD (09/02/2023)   ED to Hosp-Admission (Discharged) with Sathish Dyer MD; Peter Gonzales MD (08/19/2023)     I have reviewed the patient's medical, family, and social history in detail and updated the computerized patient record.    /78 (BP Location: Left arm, Patient Position: Sitting, Cuff Size: Large Adult)   Pulse 90   Resp 16   Ht 188 cm (74.02\")   Wt 103 kg (227 lb)   SpO2 97%   BMI 29.13 kg/m²      Physical Exam    Vital signs reviewed.   General: No acute distress.  Eyes: conjunctiva clear; pupils equally round and reactive  ENT: external ears atraumatic  CV: no peripheral edema; S1, S2, no murmurs rubs or gallops  Resp: normal respiratory effort, no use of accessory muscles; CTA " B/L  Skin: no rashes or wounds; normal turgor  Psych: mood and affect appropriate; recent and remote memory intact  Neuro: sensation to light touch intact; 5/5  strength    Common labs          9/3/2023    05:50 9/4/2023    05:09 9/6/2023    17:55   Common Labs   Glucose 104  107     BUN 19  24     Creatinine 0.91  1.03     Sodium 139  139     Potassium 4.2  4.1     Chloride 106  107     Calcium 9.2  9.1     WBC 8.14  8.13  9.41       Hemoglobin 14.8  14.6  14.8       Hematocrit 44.1  44.3  45.4       Platelets 304  308  367          Details          This result is from an external source.             MRI Brain Without Contrast (09/02/2023 16:46)   No new abnormality.  Subacute infarct in the left inferior medial cerebellum as well as the posterior cerebellum on the left.  Small vessel disease within the cerebral white matter.    CT Head Without Contrast (08/21/2023 08:35)   Small vessel disease.    MRI Brain Without Contrast (08/16/2023 19:49)   2 small acute infarcts in the inferior aspect of the left cerebellum without mass effect or hemorrhage       Diagnoses and all orders for this visit:    History of CVA (cerebrovascular accident)    Essential hypertension    Mixed hyperlipidemia    Mixed anxiety depressive disorder  -     vilazodone (Viibryd) 40 MG tablet tablet; Take 1 tablet by mouth Daily.    Other orders  -     carvedilol (COREG) 6.25 MG tablet; Take 1 tablet by mouth 2 (Two) Times a Day With Meals.  -     aspirin 81 MG EC tablet; Take 1 tablet by mouth Daily.  -     hydrALAZINE (APRESOLINE) 100 MG tablet; Take 1 tablet by mouth Every 8 (Eight) Hours.  -     lisinopril (PRINIVIL,ZESTRIL) 40 MG tablet; Take 1 tablet by mouth Daily.  -     spironolactone (ALDACTONE) 25 MG tablet; Take 1 tablet by mouth Daily.  -     hydrOXYzine (ATARAX) 25 MG tablet; Take 1 tablet by mouth 3 (Three) Times a Day As Needed for Anxiety.      Resolving effects of acute CVA.  He is on medication management including  optimizing blood pressure control, antiplatelet therapy and statin therapy.  Refill sent on medications as needed.  He has follow-ups with neurology in November.  Further cardiology evaluation pending soon.  He also had referral placed for vestibular therapy as he is continuing to have some left-sided weakness and dizziness though this is improving.    I spent 45 minutes caring for Flako on this date of service. This time includes time spent by me in the following activities:preparing for the visit, reviewing tests, obtaining and/or reviewing a separately obtained history, performing a medically appropriate examination and/or evaluation , counseling and educating the patient/family/caregiver, ordering medications, tests, or procedures, documenting information in the medical record, and independently interpreting results and communicating that information with the patient/family/caregiver    Follow Up   Return in about 3 months (around 12/18/2023) for Recheck.  Patient was given instructions and counseling regarding his condition or for health maintenance advice. Please see specific information pulled into the AVS if appropriate.     EMR Dragon/Transcription disclaimer:    Much of this encounter note is an electronic transcription/translation of spoken language to printed text.  The electronic translation of spoken language may permit erroneous, or at times, nonsensical words or phrases to be inadvertently transcribed.  Although I have reviewed the note for such errors some may still exist.

## 2023-09-19 NOTE — PROGRESS NOTES
Please let patient know their Ziopatch did not show an arrhythmia (atrial fib/flutter) associated with TIA/stroke.

## 2023-09-21 ENCOUNTER — TELEPHONE (OUTPATIENT)
Dept: NEUROLOGY | Facility: CLINIC | Age: 58
End: 2023-09-21

## 2023-09-21 NOTE — TELEPHONE ENCOUNTER
Caller: Flako Coreas    Relationship to patient: Self    Best call back number: [phone]    Chief complaint: STROKE    Type of visit: HOSP FOLLOW UP    Requested date: ASAP     If rescheduling, when is the original appointment: 11/29/23     Additional notes: PATIENT WOULD LIKE TO SEE IF THERE ARE ANY SOONER FOLLOW UP APPT'S AVAILABLE. HE IS STILL STRUGGLING WITH WALKING AND GENERALLY NOT FEELING WELL. PLEASE ADVISE, THANK YOU.

## 2023-09-26 ENCOUNTER — TELEPHONE (OUTPATIENT)
Dept: NEUROLOGY | Facility: CLINIC | Age: 58
End: 2023-09-26

## 2023-09-26 NOTE — TELEPHONE ENCOUNTER
Caller: KIM     Relationship: MADYSON FOWLER     Best call back number: [phone] EXT [phone]     What was the call regarding: SAID RECEIVED  MESSAGE THROUGH PORTAL FROM   ARMEN CANCINO TRYING TO GET A P.A?      P.A IS FOR THE CODE SENT WAS FOR 54611 AND THEY DON'T REVIEW THAT CODE      STATED THIS CASE IS BEING CANCEL/ AND NEED SOMEONE FROM OFFICE TO CALL TO GO OVER. .     CASE # [id number]    PLEASE ADVISE.

## 2023-09-26 NOTE — TELEPHONE ENCOUNTER
Pt has not been seen in office, only hospital (recently).  Medication? No communication regarding PA has been received by our office @ this time.

## 2023-10-02 ENCOUNTER — TELEPHONE (OUTPATIENT)
Dept: NEUROLOGY | Facility: CLINIC | Age: 58
End: 2023-10-02

## 2023-10-02 ENCOUNTER — HOSPITAL ENCOUNTER (OUTPATIENT)
Dept: CARDIOLOGY | Facility: HOSPITAL | Age: 58
Discharge: HOME | End: 2023-10-02
Admitting: PHYSICIAN ASSISTANT
Payer: COMMERCIAL

## 2023-10-02 DIAGNOSIS — I63.9 CEREBELLAR STROKE: ICD-10-CM

## 2023-10-02 DIAGNOSIS — E78.2 MIXED HYPERLIPIDEMIA: Chronic | ICD-10-CM

## 2023-10-02 DIAGNOSIS — R06.09 DOE (DYSPNEA ON EXERTION): ICD-10-CM

## 2023-10-02 LAB
BH CV STRESS BP STAGE 1: NORMAL
BH CV STRESS BP STAGE 2: NORMAL
BH CV STRESS BP STAGE 3: NORMAL
BH CV STRESS DURATION MIN STAGE 1: 3
BH CV STRESS DURATION MIN STAGE 2: 3
BH CV STRESS DURATION MIN STAGE 3: 0
BH CV STRESS DURATION SEC STAGE 1: 0
BH CV STRESS DURATION SEC STAGE 2: 0
BH CV STRESS DURATION SEC STAGE 3: 53
BH CV STRESS GRADE STAGE 1: 10
BH CV STRESS GRADE STAGE 2: 12
BH CV STRESS GRADE STAGE 3: 14
BH CV STRESS HR STAGE 1: 100
BH CV STRESS HR STAGE 2: 117
BH CV STRESS HR STAGE 3: 121
BH CV STRESS METS STAGE 1: 5
BH CV STRESS METS STAGE 2: 7.5
BH CV STRESS METS STAGE 3: 8
BH CV STRESS PROTOCOL 1: NORMAL
BH CV STRESS RECOVERY BP: NORMAL MMHG
BH CV STRESS RECOVERY HR: 74 BPM
BH CV STRESS SPEED STAGE 1: 1.7
BH CV STRESS SPEED STAGE 2: 2.5
BH CV STRESS SPEED STAGE 3: 3.4
BH CV STRESS STAGE 1: 1
BH CV STRESS STAGE 2: 2
BH CV STRESS STAGE 3: 3
MAXIMAL PREDICTED HEART RATE: 164 BPM
PERCENT MAX PREDICTED HR: 73.78 %
STRESS BASELINE BP: NORMAL MMHG
STRESS BASELINE HR: 73 BPM
STRESS PERCENT HR: 87 %
STRESS POST ESTIMATED WORKLOAD: 8 METS
STRESS POST EXERCISE DUR MIN: 6 MIN
STRESS POST EXERCISE DUR SEC: 53 SEC
STRESS POST PEAK BP: NORMAL MMHG
STRESS POST PEAK HR: 121 BPM
STRESS TARGET HR: 139 BPM

## 2023-10-02 PROCEDURE — 93016 CV STRESS TEST SUPVJ ONLY: CPT | Performed by: INTERNAL MEDICINE

## 2023-10-02 PROCEDURE — 93018 CV STRESS TEST I&R ONLY: CPT | Performed by: INTERNAL MEDICINE

## 2023-10-02 PROCEDURE — 93017 CV STRESS TEST TRACING ONLY: CPT

## 2023-10-02 NOTE — TELEPHONE ENCOUNTER
PATIENT CALLING TODAY TO REQUEST SOONER APPT.    HE STATES HE WENT FOR TEST TODAY AND COULD NOT COMPLETE IT.  SYMPTOMS ARE WORSE.    CURRENTLY 11/29/23 AND IS ON WAITLIST    PLEASE ADVISE PATIENT    THANK CARPIO

## 2023-10-04 ENCOUNTER — TELEPHONE (OUTPATIENT)
Dept: SPORTS MEDICINE | Facility: CLINIC | Age: 58
End: 2023-10-04
Payer: COMMERCIAL

## 2023-10-04 ENCOUNTER — TELEPHONE (OUTPATIENT)
Dept: NEUROLOGY | Facility: CLINIC | Age: 58
End: 2023-10-04

## 2023-10-04 DIAGNOSIS — Z86.73 HISTORY OF CVA (CEREBROVASCULAR ACCIDENT): Primary | ICD-10-CM

## 2023-10-04 NOTE — TELEPHONE ENCOUNTER
Caller: Flako Coreas    Relationship: Self    Best call back number:     What is the medical concern/diagnosis: POST STROKE    What specialty or service is being requested: POST STROKE ASSESSMENT RECOVERY PHYSICAL THERAPY    What is the provider, practice or medical service name: [facility]    What is the office location: [place]

## 2023-10-04 NOTE — TELEPHONE ENCOUNTER
Caller: NIMESH     Luan call back number: [phone]    What was the call regarding: PT CALLING WOULD LIKE TO GET IN SOONER , HE SAID STILL CAN'T WALK CORRECT. CAN'T DRIVE SO CAN'T WORK.  HE LEFT THE REHAB SAID PLACE WAS DIRTY AND HE GOT BITES ON HIM?  INFORMED TO CALL PCP TO GET HIM IN ANOTHER P.T PLACE. I WILL SEND ENCOUNTER TO OFFICE TO SEE ABOUT SOONER APPT      Is it okay if the provider responds through MyChart: CALL    PLEASE ADVISE.

## 2023-10-04 NOTE — TELEPHONE ENCOUNTER
PT order placed, OK per Dr. Rodriguez     Outgoing call to the patient, informed order placement, mychart message sent with their phone number, he will call to schedule his appointment.     Thanks  Priti

## 2023-10-05 NOTE — TELEPHONE ENCOUNTER
I spoke with pt and he says that he is going to continue PT and he is on the wait list for a sooner appt.  Pt voices understanding and agrees.

## 2023-10-20 ENCOUNTER — TELEPHONE (OUTPATIENT)
Dept: CARDIOLOGY | Facility: CLINIC | Age: 58
End: 2023-10-20
Payer: COMMERCIAL

## 2023-10-20 NOTE — TELEPHONE ENCOUNTER
----- Message from Wayne Crystal PA-C sent at 10/20/2023  1:05 PM EDT -----  Please let the patient know that his Holter monitor did not show any abnormal rhythms.  We were doing this test to see if there was possibility that he had atrial fibrillation to explain his stroke.  We did not see it on this test.

## 2023-10-20 NOTE — TELEPHONE ENCOUNTER
Called and left VM, will continue to try to reach pt.    Mandy Gan, RN  Triage RN  10/20/23 13:12 EDT

## 2023-10-23 ENCOUNTER — APPOINTMENT (OUTPATIENT)
Dept: MRI IMAGING | Facility: HOSPITAL | Age: 58
End: 2023-10-23
Payer: MEDICAID

## 2023-10-23 ENCOUNTER — APPOINTMENT (OUTPATIENT)
Dept: GENERAL RADIOLOGY | Facility: HOSPITAL | Age: 58
End: 2023-10-23
Payer: MEDICAID

## 2023-10-23 ENCOUNTER — HOSPITAL ENCOUNTER (INPATIENT)
Facility: HOSPITAL | Age: 58
LOS: 2 days | Discharge: HOME | End: 2023-10-25
Attending: EMERGENCY MEDICINE | Admitting: HOSPITALIST
Payer: COMMERCIAL

## 2023-10-23 ENCOUNTER — APPOINTMENT (OUTPATIENT)
Dept: CT IMAGING | Facility: HOSPITAL | Age: 58
End: 2023-10-23
Payer: MEDICAID

## 2023-10-23 DIAGNOSIS — I63.9 ACUTE CVA (CEREBROVASCULAR ACCIDENT): Primary | ICD-10-CM

## 2023-10-23 DIAGNOSIS — I63.442 CEREBROVASCULAR ACCIDENT (CVA) DUE TO EMBOLISM OF LEFT CEREBELLAR ARTERY: ICD-10-CM

## 2023-10-23 DIAGNOSIS — R27.0 ATAXIA: ICD-10-CM

## 2023-10-23 PROBLEM — I63.542 OCCLUSION OF LEFT POSTERIOR INFERIOR CEREBELLAR ARTERY WITH INFARCTION: Status: ACTIVE | Noted: 2023-10-23

## 2023-10-23 LAB
ABO GROUP BLD: NORMAL
ALBUMIN SERPL-MCNC: 4.3 G/DL (ref 3.5–5.2)
ALBUMIN/GLOB SERPL: 1.8 G/DL
ALP SERPL-CCNC: 68 U/L (ref 39–117)
ALT SERPL W P-5'-P-CCNC: 29 U/L (ref 1–41)
ANION GAP SERPL CALCULATED.3IONS-SCNC: 9 MMOL/L (ref 5–15)
APTT PPP: 28.9 SECONDS (ref 22.7–35.4)
AST SERPL-CCNC: 23 U/L (ref 1–40)
BASOPHILS # BLD AUTO: 0.03 10*3/MM3 (ref 0–0.2)
BASOPHILS NFR BLD AUTO: 0.3 % (ref 0–1.5)
BILIRUB SERPL-MCNC: 0.3 MG/DL (ref 0–1.2)
BLD GP AB SCN SERPL QL: NEGATIVE
BUN SERPL-MCNC: 20 MG/DL (ref 6–20)
BUN/CREAT SERPL: 16.8 (ref 7–25)
CALCIUM SPEC-SCNC: 9.3 MG/DL (ref 8.6–10.5)
CHLORIDE SERPL-SCNC: 105 MMOL/L (ref 98–107)
CO2 SERPL-SCNC: 29 MMOL/L (ref 22–29)
CREAT SERPL-MCNC: 1.19 MG/DL (ref 0.76–1.27)
DEPRECATED RDW RBC AUTO: 42.7 FL (ref 37–54)
EGFRCR SERPLBLD CKD-EPI 2021: 71.7 ML/MIN/1.73
EOSINOPHIL # BLD AUTO: 0.62 10*3/MM3 (ref 0–0.4)
EOSINOPHIL NFR BLD AUTO: 7 % (ref 0.3–6.2)
ERYTHROCYTE [DISTWIDTH] IN BLOOD BY AUTOMATED COUNT: 14.7 % (ref 12.3–15.4)
GLOBULIN UR ELPH-MCNC: 2.4 GM/DL
GLUCOSE BLDC GLUCOMTR-MCNC: 141 MG/DL (ref 70–130)
GLUCOSE BLDC GLUCOMTR-MCNC: 151 MG/DL (ref 70–130)
GLUCOSE SERPL-MCNC: 131 MG/DL (ref 65–99)
HCT VFR BLD AUTO: 39.1 % (ref 37.5–51)
HGB BLD-MCNC: 13.1 G/DL (ref 13–17.7)
HOLD SPECIMEN: NORMAL
HOLD SPECIMEN: NORMAL
IMM GRANULOCYTES # BLD AUTO: 0.02 10*3/MM3 (ref 0–0.05)
IMM GRANULOCYTES NFR BLD AUTO: 0.2 % (ref 0–0.5)
INR PPP: 0.93 (ref 0.9–1.1)
LYMPHOCYTES # BLD AUTO: 2.55 10*3/MM3 (ref 0.7–3.1)
LYMPHOCYTES NFR BLD AUTO: 28.7 % (ref 19.6–45.3)
MCH RBC QN AUTO: 27.2 PG (ref 26.6–33)
MCHC RBC AUTO-ENTMCNC: 33.5 G/DL (ref 31.5–35.7)
MCV RBC AUTO: 81.1 FL (ref 79–97)
MONOCYTES # BLD AUTO: 0.68 10*3/MM3 (ref 0.1–0.9)
MONOCYTES NFR BLD AUTO: 7.7 % (ref 5–12)
NEUTROPHILS NFR BLD AUTO: 4.97 10*3/MM3 (ref 1.7–7)
NEUTROPHILS NFR BLD AUTO: 56.1 % (ref 42.7–76)
NRBC BLD AUTO-RTO: 0 /100 WBC (ref 0–0.2)
PLATELET # BLD AUTO: 269 10*3/MM3 (ref 140–450)
PMV BLD AUTO: 9.7 FL (ref 6–12)
POTASSIUM SERPL-SCNC: 3.6 MMOL/L (ref 3.5–5.2)
PROT SERPL-MCNC: 6.7 G/DL (ref 6–8.5)
PROTHROMBIN TIME: 12.6 SECONDS (ref 11.7–14.2)
RBC # BLD AUTO: 4.82 10*6/MM3 (ref 4.14–5.8)
RH BLD: POSITIVE
SODIUM SERPL-SCNC: 143 MMOL/L (ref 136–145)
T&S EXPIRATION DATE: NORMAL
TROPONIN T SERPL HS-MCNC: 8 NG/L
WBC NRBC COR # BLD: 8.87 10*3/MM3 (ref 3.4–10.8)
WHOLE BLOOD HOLD COAG: NORMAL
WHOLE BLOOD HOLD SPECIMEN: NORMAL

## 2023-10-23 PROCEDURE — 93010 ELECTROCARDIOGRAM REPORT: CPT | Performed by: INTERNAL MEDICINE

## 2023-10-23 PROCEDURE — 85025 COMPLETE CBC W/AUTO DIFF WBC: CPT | Performed by: EMERGENCY MEDICINE

## 2023-10-23 PROCEDURE — 25010000002 LORAZEPAM PER 2 MG: Performed by: NURSE PRACTITIONER

## 2023-10-23 PROCEDURE — 71045 X-RAY EXAM CHEST 1 VIEW: CPT

## 2023-10-23 PROCEDURE — 84484 ASSAY OF TROPONIN QUANT: CPT | Performed by: EMERGENCY MEDICINE

## 2023-10-23 PROCEDURE — 93005 ELECTROCARDIOGRAM TRACING: CPT | Performed by: EMERGENCY MEDICINE

## 2023-10-23 PROCEDURE — 0042T HC CT CEREBRAL PERFUSION W/WO CONTRAST: CPT

## 2023-10-23 PROCEDURE — 85610 PROTHROMBIN TIME: CPT | Performed by: EMERGENCY MEDICINE

## 2023-10-23 PROCEDURE — 25510000001 IOPAMIDOL PER 1 ML: Performed by: EMERGENCY MEDICINE

## 2023-10-23 PROCEDURE — 86900 BLOOD TYPING SEROLOGIC ABO: CPT | Performed by: EMERGENCY MEDICINE

## 2023-10-23 PROCEDURE — 82565 ASSAY OF CREATININE: CPT

## 2023-10-23 PROCEDURE — 80053 COMPREHEN METABOLIC PANEL: CPT | Performed by: EMERGENCY MEDICINE

## 2023-10-23 PROCEDURE — 86850 RBC ANTIBODY SCREEN: CPT | Performed by: EMERGENCY MEDICINE

## 2023-10-23 PROCEDURE — 82948 REAGENT STRIP/BLOOD GLUCOSE: CPT

## 2023-10-23 PROCEDURE — 70496 CT ANGIOGRAPHY HEAD: CPT

## 2023-10-23 PROCEDURE — 70498 CT ANGIOGRAPHY NECK: CPT

## 2023-10-23 PROCEDURE — 99285 EMERGENCY DEPT VISIT HI MDM: CPT

## 2023-10-23 PROCEDURE — 86901 BLOOD TYPING SEROLOGIC RH(D): CPT | Performed by: EMERGENCY MEDICINE

## 2023-10-23 PROCEDURE — 70551 MRI BRAIN STEM W/O DYE: CPT

## 2023-10-23 PROCEDURE — 99291 CRITICAL CARE FIRST HOUR: CPT | Performed by: PSYCHIATRY & NEUROLOGY

## 2023-10-23 PROCEDURE — 85730 THROMBOPLASTIN TIME PARTIAL: CPT | Performed by: EMERGENCY MEDICINE

## 2023-10-23 RX ORDER — SODIUM CHLORIDE 0.9 % (FLUSH) 0.9 %
10 SYRINGE (ML) INJECTION AS NEEDED
Status: DISCONTINUED | OUTPATIENT
Start: 2023-10-23 | End: 2023-10-25 | Stop reason: HOSPADM

## 2023-10-23 RX ORDER — ASPIRIN 325 MG
325 TABLET ORAL DAILY
Status: DISCONTINUED | OUTPATIENT
Start: 2023-10-23 | End: 2023-10-24

## 2023-10-23 RX ORDER — SODIUM CHLORIDE 9 MG/ML
40 INJECTION, SOLUTION INTRAVENOUS AS NEEDED
Status: DISCONTINUED | OUTPATIENT
Start: 2023-10-23 | End: 2023-10-25 | Stop reason: HOSPADM

## 2023-10-23 RX ORDER — MECLIZINE HYDROCHLORIDE 25 MG/1
TABLET ORAL
Status: ON HOLD | COMMUNITY
Start: 2023-09-28 | End: 2023-11-06

## 2023-10-23 RX ORDER — SODIUM CHLORIDE 0.9 % (FLUSH) 0.9 %
10 SYRINGE (ML) INJECTION EVERY 12 HOURS SCHEDULED
Status: DISCONTINUED | OUTPATIENT
Start: 2023-10-23 | End: 2023-10-25 | Stop reason: HOSPADM

## 2023-10-23 RX ORDER — ATORVASTATIN CALCIUM 80 MG/1
80 TABLET, FILM COATED ORAL NIGHTLY
Status: DISCONTINUED | OUTPATIENT
Start: 2023-10-23 | End: 2023-10-24

## 2023-10-23 RX ORDER — CLOPIDOGREL BISULFATE 75 MG/1
75 TABLET ORAL DAILY
Status: DISCONTINUED | OUTPATIENT
Start: 2023-10-24 | End: 2023-10-24

## 2023-10-23 RX ORDER — CLOPIDOGREL BISULFATE 75 MG/1
300 TABLET ORAL ONCE
Status: COMPLETED | OUTPATIENT
Start: 2023-10-23 | End: 2023-10-23

## 2023-10-23 RX ORDER — LORAZEPAM 2 MG/ML
0.25 INJECTION INTRAMUSCULAR ONCE
Status: COMPLETED | OUTPATIENT
Start: 2023-10-23 | End: 2023-10-23

## 2023-10-23 RX ORDER — ASPIRIN 300 MG/1
300 SUPPOSITORY RECTAL DAILY
Status: DISCONTINUED | OUTPATIENT
Start: 2023-10-23 | End: 2023-10-24

## 2023-10-23 RX ADMIN — Medication 10 ML: at 20:38

## 2023-10-23 RX ADMIN — IOPAMIDOL 150 ML: 755 INJECTION, SOLUTION INTRAVENOUS at 19:25

## 2023-10-23 RX ADMIN — LORAZEPAM 0.25 MG: 2 INJECTION INTRAMUSCULAR; INTRAVENOUS at 21:01

## 2023-10-23 RX ADMIN — ASPIRIN 325 MG: 325 TABLET ORAL at 20:37

## 2023-10-23 RX ADMIN — ATORVASTATIN CALCIUM 80 MG: 80 TABLET, FILM COATED ORAL at 20:37

## 2023-10-23 RX ADMIN — CLOPIDOGREL BISULFATE 300 MG: 75 TABLET, FILM COATED ORAL at 20:05

## 2023-10-23 NOTE — ED PROVIDER NOTES
EMERGENCY DEPARTMENT ENCOUNTER    Room Number:  20/20  PCP: Akash Rodriguez Jr., DO  Historian: Patient      HPI:  Chief Complaint: Left-sided numbness  A complete HPI/ROS/PMH/PSH/SH/FH are unobtainable due to: None  Context: Flako Coreas is a 58 y.o. male who presents to the ED c/o left-sided numbness.  Patient states he has had history of recent stroke.  Patient is on aspirin and was on Plavix but is not anymore.  Patient states symptoms started about 430 tonight.  Patient states he noted some numbness to the left side of his face.  Then noted some numbness left hand.  Has had no numbness to his left leg.  Has had no weakness.  No speech changes.  Patient has baseline double vision but this is not any worse than it has been.  No headache no fevers or chills.  Patient has had no chest pain or shortness of breath            PAST MEDICAL HISTORY  Active Ambulatory Problems     Diagnosis Date Noted    Mixed anxiety depressive disorder 12/11/2012    Mixed hyperlipidemia 06/26/2017    Diverticulosis 07/27/2018    Nodule of spleen 06/27/2018    Chronic bilateral lower abdominal pain 08/10/2018    Lepe's esophagus without dysplasia 10/10/2018    GERD (gastroesophageal reflux disease) 09/18/2018    History of esophagitis 12/08/2022    Dizziness 08/16/2023    Hypertensive emergency 08/19/2023    Ataxia 08/19/2023    CVA (cerebral vascular accident) 08/22/2023     Resolved Ambulatory Problems     Diagnosis Date Noted    Essential hypertension 06/26/2017    Abdominal pain 06/20/2018    Generalized abdominal pain 12/08/2022    Gastrointestinal hemorrhage 12/08/2022    Acute CVA (cerebrovascular accident) 08/17/2023     Past Medical History:   Diagnosis Date    ADHD (attention deficit hyperactivity disorder)     Anxiety     Lepe's esophagus     Benign prostatic hyperplasia Surgery in 12/2021    Clotting disorder Intestinal    Depression     Diverticulitis     Duodenitis     Enteritis     Failure to thrive  (child)     Family history of blood clots     Hyperlipidemia     Hypertension     Low testosterone     Microcytosis     Pancreatitis     Pneumonia     PONV (postoperative nausea and vomiting)     Seasonal affective disorder     Shoulder injury     Stroke     Unexplained weight loss          PAST SURGICAL HISTORY  Past Surgical History:   Procedure Laterality Date    COLON SURGERY      COLONOSCOPY      COLONOSCOPY N/A 12/11/2022    Procedure: COLONOSCOPY INTO CECUM WITH HOT SNARE POLYPECTOMY;  Surgeon: Albert Gallo MD;  Location: [facility] ENDOSCOPY;  Service: Gastroenterology;  Laterality: N/A;  PRE- GI BLEED  POST- DIVERTICULOSIS, POLYP    ENDOSCOPY N/A 12/10/2022    Procedure: ESOPHAGOGASTRODUODENOSCOPY;  Surgeon: Albert Gallo MD;  Location: [facility] ENDOSCOPY;  Service: Gastroenterology;  Laterality: N/A;  PRE- DARK STOOLS  POST- BARRETTS ESOPHAGUS    FRACTURE SURGERY  1980    for broken arm    FRACTURE SURGERY      for broken hand    INCISION AND DRAINAGE ABSCESS  2015    anal    PROSTATE SURGERY      SMALL INTESTINE SURGERY      TONSILLECTOMY           FAMILY HISTORY  Family History   Problem Relation Age of Onset    Stroke Mother     Stroke Father          SOCIAL HISTORY  Social History     Socioeconomic History    Marital status: Single   Tobacco Use    Smoking status: Never    Smokeless tobacco: Never   Vaping Use    Vaping Use: Never used   Substance and Sexual Activity    Alcohol use: Yes     Alcohol/week: 20.0 standard drinks of alcohol     Types: 10 Cans of beer, 10 Shots of liquor per week     Comment: pt states very rarely    Drug use: Yes     Frequency: 1.0 times per week     Types: Marijuana     Comment: Pt states he may have smoked marijuana 3 weeks ago (stated on 11-07-18)    Sexual activity: Not Currently     Partners: Female         ALLERGIES  Patient has no known allergies.        REVIEW OF SYSTEMS  Review of Systems   Left-sided numbness      PHYSICAL EXAM  ED Triage Vitals   Temp Heart Rate  Resp BP SpO2   10/23/23 1852 10/23/23 1852 10/23/23 1852 10/23/23 1902 10/23/23 1852   96.8 °F (36 °C) 74 18 140/82 93 %      Temp src Heart Rate Source Patient Position BP Location FiO2 (%)   10/23/23 1852 10/23/23 1852 10/23/23 1902 10/23/23 1902 --   Tympanic Monitor Lying Right arm        Physical Exam      GENERAL: no acute distress  HENT: nares patent  EYES: no scleral icterus  CV: regular rhythm, normal rate  RESPIRATORY: normal effort  ABDOMEN: soft  MUSCULOSKELETAL: no deformity  NEURO: alert, moves all extremities, follows commands  PSYCH:  calm, cooperative  SKIN: warm, dry    Vital signs and nursing notes reviewed.    NIH 1      LAB RESULTS  Recent Results (from the past 24 hour(s))   POC Glucose Once    Collection Time: 10/23/23  7:00 PM    Specimen: Blood   Result Value Ref Range    Glucose 141 (H) 70 - 130 mg/dL   Comprehensive Metabolic Panel    Collection Time: 10/23/23  7:09 PM    Specimen: Blood   Result Value Ref Range    Glucose 131 (H) 65 - 99 mg/dL    BUN 20 6 - 20 mg/dL    Creatinine 1.19 0.76 - 1.27 mg/dL    Sodium 143 136 - 145 mmol/L    Potassium 3.6 3.5 - 5.2 mmol/L    Chloride 105 98 - 107 mmol/L    CO2 29.0 22.0 - 29.0 mmol/L    Calcium 9.3 8.6 - 10.5 mg/dL    Total Protein 6.7 6.0 - 8.5 g/dL    Albumin 4.3 3.5 - 5.2 g/dL    ALT (SGPT) 29 1 - 41 U/L    AST (SGOT) 23 1 - 40 U/L    Alkaline Phosphatase 68 39 - 117 U/L    Total Bilirubin 0.3 0.0 - 1.2 mg/dL    Globulin 2.4 gm/dL    A/G Ratio 1.8 g/dL    BUN/Creatinine Ratio 16.8 7.0 - 25.0    Anion Gap 9.0 5.0 - 15.0 mmol/L    eGFR 71.7 >60.0 mL/min/1.73   Protime-INR    Collection Time: 10/23/23  7:09 PM    Specimen: Blood   Result Value Ref Range    Protime 12.6 11.7 - 14.2 Seconds    INR 0.93 0.90 - 1.10   aPTT    Collection Time: 10/23/23  7:09 PM    Specimen: Blood   Result Value Ref Range    PTT 28.9 22.7 - 35.4 seconds   Single High Sensitivity Troponin T    Collection Time: 10/23/23  7:09 PM    Specimen: Blood   Result Value Ref  Range    HS Troponin T 8 <15 ng/L   Type & Screen    Collection Time: 10/23/23  7:09 PM    Specimen: Blood   Result Value Ref Range    ABO Type A     RH type Positive     Antibody Screen Negative     T&S Expiration Date 10/26/2023 11:59:59 PM    Green Top (Gel)    Collection Time: 10/23/23  7:09 PM   Result Value Ref Range    Extra Tube Hold for add-ons.    Lavender Top    Collection Time: 10/23/23  7:09 PM   Result Value Ref Range    Extra Tube hold for add-on    Gold Top - SST    Collection Time: 10/23/23  7:09 PM   Result Value Ref Range    Extra Tube Hold for add-ons.    Light Blue Top    Collection Time: 10/23/23  7:09 PM   Result Value Ref Range    Extra Tube Hold for add-ons.    CBC Auto Differential    Collection Time: 10/23/23  7:09 PM    Specimen: Blood   Result Value Ref Range    WBC 8.87 3.40 - 10.80 10*3/mm3    RBC 4.82 4.14 - 5.80 10*6/mm3    Hemoglobin 13.1 13.0 - 17.7 g/dL    Hematocrit 39.1 37.5 - 51.0 %    MCV 81.1 79.0 - 97.0 fL    MCH 27.2 26.6 - 33.0 pg    MCHC 33.5 31.5 - 35.7 g/dL    RDW 14.7 12.3 - 15.4 %    RDW-SD 42.7 37.0 - 54.0 fl    MPV 9.7 6.0 - 12.0 fL    Platelets 269 140 - 450 10*3/mm3    Neutrophil % 56.1 42.7 - 76.0 %    Lymphocyte % 28.7 19.6 - 45.3 %    Monocyte % 7.7 5.0 - 12.0 %    Eosinophil % 7.0 (H) 0.3 - 6.2 %    Basophil % 0.3 0.0 - 1.5 %    Immature Grans % 0.2 0.0 - 0.5 %    Neutrophils, Absolute 4.97 1.70 - 7.00 10*3/mm3    Lymphocytes, Absolute 2.55 0.70 - 3.10 10*3/mm3    Monocytes, Absolute 0.68 0.10 - 0.90 10*3/mm3    Eosinophils, Absolute 0.62 (H) 0.00 - 0.40 10*3/mm3    Basophils, Absolute 0.03 0.00 - 0.20 10*3/mm3    Immature Grans, Absolute 0.02 0.00 - 0.05 10*3/mm3    nRBC 0.0 0.0 - 0.2 /100 WBC   ECG 12 Lead Stroke Evaluation    Collection Time: 10/23/23  7:35 PM   Result Value Ref Range    QT Interval 481 ms    QTC Interval 473 ms       Ordered the above labs and reviewed the results.        RADIOLOGY  CT Angiogram Head w AI Analysis of LVO, CT Angiogram  Neck, CT CEREBRAL PERFUSION WITH & WITHOUT CONTRAST    Result Date: 10/23/2023  CT ANGIOGRAM HEAD AND NECK WITH IV CONTRAST, CT CEREBRAL PERFUSION WITH AND WITHOUT CONTRAST.  HISTORY: Neuro deficit. Suspected acute stroke. Hand and face tingling.  TECHNIQUE: Head CT includes axial measuring from the base of skull to the vertex without IV contrast. CT angiogram head and neck includes axial imaging with IV contrast and data reconstructed in coronal and sagittal planes and three-dimensional volume rendering was performed. AI analysis of LVO was utilized. CT cerebral perfusion exam was obtained with and without IV contrast with use of rapid software.  COMPARISON: MRI brain 09/02/2023, CT head 08/21/2023, CT angiogram head and neck 08/19/2023, CT angiogram head and neck 08/16/2023.  FINDINGS: Noncontrasted head CT demonstrates small area of low density within the left cerebellar hemisphere that correlates with the inferomedial left cerebellar hemispheres infarction was demonstrated to be acute on brain MRI 08/16/2023. There are no abnormal areas of increased attenuation intra-axial lead to suggest hemorrhage. No extra-axial fluid collection is observed. There is no evidence for mass effect or shift of the midline structures. CT cerebral perfusion exam demonstrates 0 mL where there is cerebral blood flow less than 30%. There is delayed flow to the left cerebral hemisphere focally where there is Tmax greater than 6 seconds to an area measuring 6 mL and this is in the similar region as the cerebellar infarction demonstrated on previous brain MRI.  Atherosclerotic calcifications are present involving the aortic arch. Great vessel origins are patent. There are partially calcified plaques involving the carotid bulbs and ICA origins similar to the previous CT angiogram without evidence for NASCET stenosis. The distal cervical, petrous, cavernous, supracavernous internal carotid arteries are patent. Atherosclerotic  calcifications are present involving the cavernous and supracavernous segments of both internal carotid arteries with mild narrowing. Both middle cerebral arteries and anterior cerebral arteries are patent.  Right vertebral artery is larger than the left and there is moderate narrowing at the origin of the right vertebral artery. Both cervical vertebral arteries are patent. The intracranial, post PICA segment of the left vertebral artery is exceedingly small though this appears similar to the prior exam. There is mild to moderate narrowing of the intracranial segment right vertebral artery. There is mild to moderate narrowing of the basilar artery. Both posterior cerebral arteries are patent.      Left cerebellar hemisphere infarction was demonstrated to be acute on brain MRI 08/16/2023. No evidence for hemorrhagic conversion/intracranial hemorrhage. There is delayed flow to the inferomedial left cerebral hemisphere in the region of the infarction without area of diminished cerebral blood flow. CT angiogram head and neck appears similar to the prior exam 08/19/2023. Atherosclerotic calcifications are present involving the carotid bulbs and proximal ICA's without NASCET stenosis. The left post PICA vertebral artery is very small. There is atherosclerotic disease involving the intracranial segment right vertebral artery and the basilar artery with mild to moderate narrowing. Moderate narrowing origin of the right vertebral artery. Further evaluation could be performed with brain MRI if indicated.  Findings of the noncontrast exam discussed with Dr. Rodriguez at 7:28 p.m. Findings of the contrasted exam discussed with Dr. Rodriguez at 7:50 p.m. AI analysis of LVO was utilized.  Radiation dose reduction techniques were utilized, including automated exposure control and exposure modulation based on body size.       XR Chest 1 View    Result Date: 10/23/2023  XR CHEST 1 VW-10/23/2023  HISTORY: Acute stroke protocol.  Heart size is at  the upper limits of normal. Lungs appear free of acute infiltrates.  Bones and soft tissues are unremarkable. There is mildly prominent soft tissue in the medial aspect of the right apex probably ectatic vascular structures and similar to the 8/19/2023 study.      1. No acute process.  This report was finalized on 10/23/2023 7:57 PM by Dr. Salvador Arita M.D on Workstation: [employer]       Ordered the above noted radiological studies.  Chest x-ray independently interpreted by me and shows no evidence of pneumonia          PROCEDURES  Procedures      EKG          EKG time: 1935  Rhythm/Rate: Sinus bradycardia 58  P waves and RI: Normal P waves  QRS, axis: Normal QRS  ST and T waves: Normal ST-T wave    Interpreted Contemporaneously by me, independently viewed  Unchanged compared to prior 9/2/23      MEDICATIONS GIVEN IN ER  Medications   sodium chloride 0.9 % flush 10 mL (has no administration in time range)   sodium chloride 0.9 % flush 10 mL (10 mL Intravenous Given 10/23/23 2038)   sodium chloride 0.9 % flush 10 mL (has no administration in time range)   sodium chloride 0.9 % infusion 40 mL (has no administration in time range)   atorvastatin (LIPITOR) tablet 80 mg (80 mg Oral Given 10/23/23 2037)   aspirin tablet 325 mg (325 mg Oral Given 10/23/23 2037)     Or   aspirin suppository 300 mg ( Rectal Not Given:  See Alt 10/23/23 2037)   clopidogrel (PLAVIX) tablet 75 mg (has no administration in time range)   iopamidol (ISOVUE-370) 76 % injection 150 mL (150 mL Intravenous Given by Other 10/23/23 1925)   clopidogrel (PLAVIX) tablet 300 mg (300 mg Oral Given 10/23/23 2005)   LORazepam (ATIVAN) injection 0.25 mg (0.25 mg Intravenous Given 10/23/23 2101)                   MEDICAL DECISION MAKING, PROGRESS, and CONSULTS     Discussion below represents my analysis of pertinent findings related to patient's condition, differential diagnosis, treatment plan and final disposition.      Additional sources:  - Discussed/  obtained information from independent historians: None    - External (non-ED) record review: Epic reviewed and patient seen at outside ER 9/6/2023 for vertigo after CVA    - Chronic or social conditions impacting care: None    - Shared decision making: None      Orders placed during this visit:  Orders Placed This Encounter   Procedures    CT Angiogram Head w AI Analysis of LVO    CT Angiogram Neck    CT CEREBRAL PERFUSION WITH & WITHOUT CONTRAST    XR Chest 1 View    MRI Brain Without Contrast    [place] Draw    Comprehensive Metabolic Panel    Protime-INR    aPTT    Single High Sensitivity Troponin T    CBC Auto Differential    Hemoglobin A1c    Lipid Panel    NPO Diet NPO Type: Strict NPO    Initiate Department's Acute Stroke Process (Team D, Code 19, etc.)    Perform NIH Stroke Scale    Measure Actual Weight    Notify MD for SBP < 80 or > 200    Notify Provider for SBP greater than 140 if hemorrhagic stroke    Head of Bed 30 Degrees or Less    Undress and Gown    Vital Signs    Neuro Checks    No Hypotonic Fluids    Nursing Dysphagia Screening (Complete Prior to Giving anything PO)    RN to Place Order SLP Consult (IF swallow screen failed) - Eval & Treat Choosing Reason of RN Dysphagia Screen Failed    Vital Signs    Pulse Oximetry, Continuous    Telemetry - Place Orders & Notify Provider of Results When Patient Experiences Acute Chest Pain, Dysrhythmia or Respiratory Distress    Notify Provider    Nursing Dysphagia Screening (Complete Prior to Giving Anything By Mouth)    RN to Place Order SLP Consult - Eval & Treat Choosing Reason of RN Dysphagia Screen Failed    Nurse to Call MD or Nutrition Services for Diet if Patient Passes Dysphagia Screen    Intake and Output    Neuro Checks    NIHSS Assessment    Order CT Head Without Contrast for Neurological Decline    Provide Stroke Education Material    Saline Lock & Maintain IV Access    Place Sequential Compression Device    Maintain Sequential Compression Device     Activity As Tolerated    Inpatient Neurology Consult Stroke    Inpatient Neurology Consult Stroke    [facility] (on-call MD unless specified) Details    Notify Stroke Coordinator    Inpatient Rehab Admission Consult    Inpatient Case Management  Consult    Inpatient Diabetes Educator Consult    OT Consult: Eval & Treat    PT Consult: Eval & Treat As Tolerated    Oxygen Therapy- Nasal Cannula; Titrate 1-6 LPM Per SpO2; 90 - 95%    SLP Consult: Eval & Treat Communication Disorder    POC Glucose Once    POC Glucose Once    POC Glucose Q6H    ECG 12 Lead Stroke Evaluation    Type & Screen    Insert Large-Bore Peripheral IV - RIGHT AC Preferred    Insert Peripheral IV    Inpatient Admission    Inpatient Admission    CBC & Differential    Green Top (Gel)    Lavender Top    Gold Top - SST    Light Blue Top         Additional orders considered but not ordered:  None        Differential diagnosis includes but is not limited to:    CVA versus TIA      Independent interpretation of labs, radiology studies, and discussions with consultants:  ED Course as of 10/23/23 2104   Mon Oct 23, 2023   1912 19:12 EDT  Discussed with stroke neurologist on arrival and will have team D for imaging.  Patient's deficit is only a 1 so we would favor not doing tPA at this point unless the imaging shows otherwise [SL]   2012 20:12 EDT  Patient presents for left-sided numbness.  Patient's NIH score is too low for thrombolytics.  Has been seen by stroke neurology and he has also declined this.  Patient has been reloaded with Plavix.  CT scan shows no intervenable lesion.  Patient has been discussed with [employer] DON with [facility] who will admit. [SL]      ED Course User Index  [SL] Bjorn Sin MD                 DIAGNOSIS  Final diagnoses:   Acute CVA (cerebrovascular accident)         DISPOSITION  admit            Latest Documented Vital Signs:  As of 21:04 EDT  BP- 121/81 HR- 53 Temp- 96.8 °F (36 °C) (Tympanic) O2 sat- 92%               --    Please note that portions of this were completed with a voice recognition program.       Note Disclaimer: At [facility], we believe that sharing information builds trust and better relationships. You are receiving this note because you are receiving care at [facility] or recently visited. It is possible you will see health information before a provider has talked with you about it. This kind of information can be easy to misunderstand. To help you fully understand what it means for your health, we urge you to discuss this note with your provider.            Bjorn Sin MD  10/23/23 2456

## 2023-10-23 NOTE — ED NOTES
"Pt states he had some tingling to L face, LH. Has hx of prior cva, onset at 1630. Takes 325 mg ASA daily. Taken at 1000.     Does have intermittent motion sickness since last cva in August so does not drive. States is a little \"off balance at times\" since last cva but can walk independently.   "

## 2023-10-23 NOTE — TELEPHONE ENCOUNTER
Called and left VM, will continue to try to reach pt.    Mandy Gan, RN  Triage RN  10/23/23 10:56 EDT

## 2023-10-23 NOTE — Clinical Note
Level of Care: Med/Surg [1]   Diagnosis: 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 [[id number]]   Certification: I Certify That Inpatient Hospital Services Are Medically Necessary For Greater Than 2 Midnights

## 2023-10-24 PROBLEM — I10 HTN (HYPERTENSION): Status: ACTIVE | Noted: 2023-10-24

## 2023-10-24 LAB
CHOLEST SERPL-MCNC: 127 MG/DL (ref 0–200)
GLUCOSE BLDC GLUCOMTR-MCNC: 118 MG/DL (ref 70–130)
GLUCOSE BLDC GLUCOMTR-MCNC: 128 MG/DL (ref 70–130)
GLUCOSE BLDC GLUCOMTR-MCNC: 83 MG/DL (ref 70–130)
GLUCOSE BLDC GLUCOMTR-MCNC: 93 MG/DL (ref 70–130)
HBA1C MFR BLD: 5.7 % (ref 4.8–5.6)
HDLC SERPL-MCNC: 39 MG/DL (ref 40–60)
LDLC SERPL CALC-MCNC: 71 MG/DL (ref 0–100)
LDLC/HDLC SERPL: 1.81 {RATIO}
PA ADP PRP-ACNC: 212 PRU (ref 194–418)
QT INTERVAL: 481 MS
QTC INTERVAL: 473 MS
TRIGL SERPL-MCNC: 88 MG/DL (ref 0–150)
VLDLC SERPL-MCNC: 17 MG/DL (ref 5–40)

## 2023-10-24 PROCEDURE — 80061 LIPID PANEL: CPT | Performed by: PSYCHIATRY & NEUROLOGY

## 2023-10-24 PROCEDURE — 97116 GAIT TRAINING THERAPY: CPT

## 2023-10-24 PROCEDURE — 83036 HEMOGLOBIN GLYCOSYLATED A1C: CPT | Performed by: PSYCHIATRY & NEUROLOGY

## 2023-10-24 PROCEDURE — 99233 SBSQ HOSP IP/OBS HIGH 50: CPT | Performed by: NURSE PRACTITIONER

## 2023-10-24 PROCEDURE — 97161 PT EVAL LOW COMPLEX 20 MIN: CPT

## 2023-10-24 PROCEDURE — 85576 BLOOD PLATELET AGGREGATION: CPT | Performed by: NURSE PRACTITIONER

## 2023-10-24 PROCEDURE — 82948 REAGENT STRIP/BLOOD GLUCOSE: CPT

## 2023-10-24 RX ORDER — CARVEDILOL 6.25 MG/1
6.25 TABLET ORAL 2 TIMES DAILY WITH MEALS
Status: DISCONTINUED | OUTPATIENT
Start: 2023-10-24 | End: 2023-10-25 | Stop reason: HOSPADM

## 2023-10-24 RX ORDER — HYDRALAZINE HYDROCHLORIDE 50 MG/1
100 TABLET, FILM COATED ORAL EVERY 8 HOURS SCHEDULED
Status: DISCONTINUED | OUTPATIENT
Start: 2023-10-24 | End: 2023-10-25 | Stop reason: HOSPADM

## 2023-10-24 RX ORDER — LISINOPRIL 40 MG/1
40 TABLET ORAL DAILY
Status: DISCONTINUED | OUTPATIENT
Start: 2023-10-24 | End: 2023-10-25 | Stop reason: HOSPADM

## 2023-10-24 RX ORDER — ASPIRIN 81 MG/1
81 TABLET, CHEWABLE ORAL DAILY
Status: DISCONTINUED | OUTPATIENT
Start: 2023-10-25 | End: 2023-10-25 | Stop reason: HOSPADM

## 2023-10-24 RX ORDER — FAMOTIDINE 20 MG/1
20 TABLET, FILM COATED ORAL
Status: DISCONTINUED | OUTPATIENT
Start: 2023-10-24 | End: 2023-10-25 | Stop reason: HOSPADM

## 2023-10-24 RX ORDER — LORAZEPAM 0.5 MG/1
0.5 TABLET ORAL ONCE
Status: COMPLETED | OUTPATIENT
Start: 2023-10-24 | End: 2023-10-24

## 2023-10-24 RX ORDER — AMLODIPINE BESYLATE 5 MG/1
5 TABLET ORAL DAILY
Status: DISCONTINUED | OUTPATIENT
Start: 2023-10-24 | End: 2023-10-25 | Stop reason: HOSPADM

## 2023-10-24 RX ORDER — ATORVASTATIN CALCIUM 80 MG/1
80 TABLET, FILM COATED ORAL NIGHTLY
Status: DISCONTINUED | OUTPATIENT
Start: 2023-10-24 | End: 2023-10-25 | Stop reason: HOSPADM

## 2023-10-24 RX ORDER — VILAZODONE HYDROCHLORIDE 40 MG/1
40 TABLET ORAL DAILY
Status: DISCONTINUED | OUTPATIENT
Start: 2023-10-24 | End: 2023-10-25 | Stop reason: HOSPADM

## 2023-10-24 RX ORDER — MECLIZINE HYDROCHLORIDE 25 MG/1
25 TABLET ORAL 3 TIMES DAILY PRN
Status: DISCONTINUED | OUTPATIENT
Start: 2023-10-24 | End: 2023-10-25 | Stop reason: HOSPADM

## 2023-10-24 RX ORDER — SPIRONOLACTONE 25 MG/1
25 TABLET ORAL DAILY
Status: DISCONTINUED | OUTPATIENT
Start: 2023-10-24 | End: 2023-10-25 | Stop reason: HOSPADM

## 2023-10-24 RX ORDER — HYDROCHLOROTHIAZIDE 12.5 MG/1
12.5 TABLET ORAL DAILY
Status: DISCONTINUED | OUTPATIENT
Start: 2023-10-24 | End: 2023-10-25 | Stop reason: HOSPADM

## 2023-10-24 RX ORDER — HYDROXYZINE HYDROCHLORIDE 25 MG/1
25 TABLET, FILM COATED ORAL 3 TIMES DAILY PRN
Status: DISCONTINUED | OUTPATIENT
Start: 2023-10-24 | End: 2023-10-25 | Stop reason: HOSPADM

## 2023-10-24 RX ADMIN — HYDRALAZINE HYDROCHLORIDE 100 MG: 50 TABLET, FILM COATED ORAL at 09:19

## 2023-10-24 RX ADMIN — SPIRONOLACTONE 25 MG: 25 TABLET, FILM COATED ORAL at 09:23

## 2023-10-24 RX ADMIN — LISINOPRIL 40 MG: 40 TABLET ORAL at 09:19

## 2023-10-24 RX ADMIN — AMLODIPINE BESYLATE 5 MG: 5 TABLET ORAL at 09:19

## 2023-10-24 RX ADMIN — CARVEDILOL 6.25 MG: 6.25 TABLET, FILM COATED ORAL at 17:22

## 2023-10-24 RX ADMIN — HYDRALAZINE HYDROCHLORIDE 100 MG: 50 TABLET, FILM COATED ORAL at 14:17

## 2023-10-24 RX ADMIN — HYDROXYZINE HYDROCHLORIDE 25 MG: 25 TABLET ORAL at 21:34

## 2023-10-24 RX ADMIN — VILAZODONE HYDROCHLORIDE 40 MG: 40 TABLET, FILM COATED ORAL at 09:19

## 2023-10-24 RX ADMIN — LORAZEPAM 0.5 MG: 0.5 TABLET ORAL at 17:13

## 2023-10-24 RX ADMIN — Medication 10 ML: at 09:20

## 2023-10-24 RX ADMIN — ATORVASTATIN CALCIUM 80 MG: 80 TABLET, FILM COATED ORAL at 21:34

## 2023-10-24 RX ADMIN — TICAGRELOR 60 MG: 60 TABLET ORAL at 21:41

## 2023-10-24 RX ADMIN — HYDROCHLOROTHIAZIDE 12.5 MG: 12.5 TABLET ORAL at 09:19

## 2023-10-24 RX ADMIN — FAMOTIDINE 20 MG: 20 TABLET, FILM COATED ORAL at 17:13

## 2023-10-24 RX ADMIN — ASPIRIN 325 MG: 325 TABLET ORAL at 09:19

## 2023-10-24 RX ADMIN — HYDRALAZINE HYDROCHLORIDE 100 MG: 50 TABLET, FILM COATED ORAL at 21:37

## 2023-10-24 RX ADMIN — Medication 10 ML: at 21:34

## 2023-10-24 RX ADMIN — CLOPIDOGREL BISULFATE 75 MG: 75 TABLET, FILM COATED ORAL at 09:19

## 2023-10-24 RX ADMIN — CARVEDILOL 6.25 MG: 6.25 TABLET, FILM COATED ORAL at 09:19

## 2023-10-24 RX ADMIN — HYDROXYZINE HYDROCHLORIDE 25 MG: 25 TABLET ORAL at 14:17

## 2023-10-24 NOTE — PLAN OF CARE
Goal Outcome Evaluation:  Plan of Care Reviewed With: patient        Progress: improving     NIH 1

## 2023-10-24 NOTE — THERAPY EVALUATION
Patient Name: Flako Coreas  : 1965    MRN: [id number]                              Today's Date: 10/24/2023       Admit Date: 10/23/2023    Visit Dx:     ICD-10-CM ICD-9-CM   1. 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    Acute CVA (cerebrovascular accident)    HTN (hypertension)     Past Medical History:   Diagnosis Date    ADHD (attention deficit hyperactivity disorder)     On going issue    Anxiety     Lepe's esophagus     Benign prostatic hyperplasia Surgery in 2021    Clotting disorder Intestinal    Depression     Diverticulitis     Diverticulosis     Duodenitis     Enteritis     Failure to thrive (child)     Family history of blood clots     GERD (gastroesophageal reflux disease)     Hyperlipidemia     Hypertension     Hypertensive emergency     Low testosterone     Microcytosis     Pancreatitis     Pneumonia     PONV (postoperative nausea and vomiting)     Seasonal affective disorder     Shoulder injury     Stroke     Unexplained weight loss      Past Surgical History:   Procedure Laterality Date    COLON SURGERY      COLONOSCOPY      COLONOSCOPY N/A 2022    Procedure: COLONOSCOPY INTO CECUM WITH HOT SNARE POLYPECTOMY;  Surgeon: Albert Gallo MD;  Location: [facility] ENDOSCOPY;  Service: Gastroenterology;  Laterality: N/A;  PRE- GI BLEED  POST- DIVERTICULOSIS, POLYP    ENDOSCOPY N/A 12/10/2022    Procedure: ESOPHAGOGASTRODUODENOSCOPY;  Surgeon: Albert Gallo MD;  Location: [facility] ENDOSCOPY;  Service: Gastroenterology;   Laterality: N/A;  PRE- DARK STOOLS  POST- BARRETTS ESOPHAGUS    FRACTURE SURGERY  1980    for broken arm    FRACTURE SURGERY      for broken hand    INCISION AND DRAINAGE ABSCESS  2015    anal    PROSTATE SURGERY      SMALL INTESTINE SURGERY      TONSILLECTOMY        General Information       Row Name 10/24/23 0954          Physical Therapy Time and Intention    Document Type evaluation;discharge evaluation/summary  -     Mode of Treatment individual therapy;physical therapy (P)   -KW       Row Name 10/24/23 0954          General Information    Patient Profile Reviewed yes (P)   -KW     Prior Level of Function independent:;gait;transfer;bed mobility (P)   -KW     Existing Precautions/Restrictions fall (P)   pt reports a recent fall in the grocery store and multiple falls in the home - these occur at night w/ low lighting  -     Barriers to Rehab medically complex (P)   -KW       Row Name 10/24/23 0954          Living Environment    People in Home alone (P)   per chart  -       Row Name 10/24/23 0954          Cognition    Orientation Status (Cognition) oriented x 4 (P)   -KW       Row Name 10/24/23 0954          Safety Issues, Functional Mobility    Impairments Affecting Function (Mobility) balance;coordination;endurance/activity tolerance (P)   -KW               User Key  (r) = Recorded By, (t) = Taken By, (c) = Cosigned By      Initials Name Provider Type     Analy Ibarar, PT Physical Therapist    Chriss Law, CARIN Student PT Student                   Mobility       Row Name 10/24/23 0955          Bed Mobility    Bed Mobility supine-sit (P)   -KW     Supine-Sit [place] (Bed Mobility) supervision (P)   -KW     Assistive Device (Bed Mobility) head of bed elevated (P)   -KW       Row Name 10/24/23 0955          Sit-Stand Transfer    Sit-Stand [place] (Transfers) supervision (P)   -KW     Assistive Device (Sit-Stand Transfers) other (see comments) (P)   No AD  -KW       Row Name 10/24/23 0955           Gait/Stairs (Locomotion)    [place] Level (Gait) contact guard (P)   -KW     Assistive Device (Gait) other (see comments) (P)   No AD  -KW     Distance in Feet (Gait) 400' (P)   -KW     Deviations/Abnormal Patterns (Gait) ataxic;scissoring;base of support, narrow (P)   -KW     Bilateral Gait Deviations -- (P)   -KW     Right Sided Gait Deviations heel strike decreased (P)   -KW     Comment, (Gait/Stairs) Pt reports concerns over intermittent dizziness but w/ no symptoms at time of treatment. Pt appears uncoordinated in gait w/ compensations that are present at recent BL post L.CVA (P)   -KW               User Key  (r) = Recorded By, (t) = Taken By, (c) = Cosigned By      Initials Name Provider Type    Chriss Law, PT Student PT Student                   Obj/Interventions       Row Name 10/24/23 1003          Range of Motion Comprehensive    General Range of Motion bilateral lower extremity ROM WFL (P)   -KW     Comment, General Range of Motion noted no deficits (P)   -KW       Row Name 10/24/23 1003          Strength Comprehensive (MMT)    General Manual Muscle Testing (MMT) Assessment lower extremity strength deficits identified  -     Comment, General Manual Muscle Testing (MMT) Assessment LE generalized weakness present at BL (P)   -KW       Row Name 10/24/23 1003          Balance    Balance Assessment sitting static balance;sitting dynamic balance;standing static balance;standing dynamic balance (P)   -KW     Static Sitting Balance supervision (P)   -KW     Dynamic Sitting Balance supervision (P)   -KW     Position, Sitting Balance sitting edge of bed;unsupported (P)   -KW     Static Standing Balance contact guard (P)   -KW     Dynamic Standing Balance contact guard (P)   -KW     Position/Device Used, Standing Balance unsupported (P)   -KW     Balance Interventions sitting;standing;supported;static;dynamic (P)   -KW       Row Name 10/24/23 1003          Sensory Assessment (Somatosensory)     Sensory Assessment (Somatosensory) LE sensation intact (P)   PT reports he can feel LLE in gait activities  -KW               User Key  (r) = Recorded By, (t) = Taken By, (c) = Cosigned By      Initials Name Provider Type    Analy Marcano, PT Physical Therapist    Chriss Law, PT Student PT Student                   Goals/Plan    No documentation.                  Clinical Impression       Row Name 10/24/23 1005          Pain    Pre/Posttreatment Pain Comment pt does not c/o pain (P)   -KW       Row Name 10/24/23 1005          Plan of Care Review    Plan of Care Reviewed With patient (P)   -KW     Outcome Evaluation Pt is a 59 yo. male admited from home w/ L sided numbness. He has hx of L. cerebellar stroke, L-sided weakness and dizziness at baseline. Pt lives alone where he is ind for all household mobility and no AD. He reports recent hx of falls - 1x at grocery store and multiple falls at home at night under low lighting. Pt presents to physical therapy w/ generalized weakness and coordination impairments that are present at BL and limiting to functional mobliity. Pt performed bed mobility and STS w/ SV and no AD and ambulated 400' w/ CGA and no AD. Pt presents ataxic gait w/ compensations for L-sided weakness that pt reports at BL. Pt appears unsteady but had no LOB. Pt returned to bed w/ needs met. Pt agrees he does not require skilled acute PT and PT w/ s/o as pt mobilizes at recent BL and anticipated DC home w/ OPPT - pt has apts scheduled in advance. (P)   -KW       Row Name 10/24/23 1005          Therapy Assessment/Plan (PT)    Criteria for Skilled Interventions Met (PT) no;no problems identified which require skilled intervention (P)   -KW     Therapy Frequency (PT) evaluation only (P)   -KW       Row Name 10/24/23 1005          Positioning and Restraints    Pre-Treatment Position in bed (P)   -KW     Post Treatment Position bed (P)   -KW     In Bed supine;encouraged to call for assist;call light  within reach (P)   -KW               User Key  (r) = Recorded By, (t) = Taken By, (c) = Cosigned By      Initials Name Provider Type    Chriss Law, PT Student PT Student                   Outcome Measures       Row Name 10/24/23 1014 10/24/23 0836       How much help from another person do you currently need...    Turning from your back to your side while in flat bed without using bedrails? 4 (P)   -KW 4  -MM    Moving from lying on back to sitting on the side of a flat bed without bedrails? 4 (P)   -KW 4  -MM    Moving to and from a bed to a chair (including a wheelchair)? 4 (P)   -KW 4  -MM    Standing up from a chair using your arms (e.g., wheelchair, bedside chair)? 4 (P)   -KW 4  -MM    Climbing 3-5 steps with a railing? 3 (P)   -KW 4  -MM    To walk in hospital room? 3 (P)   -KW 4  -MM    AM-PAC 6 Clicks Score (PT) 22 (P)   -KW 24  -MM    Highest level of mobility 7 --> Walked 25 feet or more (P)   -KW 8 --> Walked 250 feet or more  -MM              User Key  (r) = Recorded By, (t) = Taken By, (c) = Cosigned By      Initials Name Provider Type    Ying Back, RN Registered Nurse    Chriss Law, PT Student PT Student                                 Physical Therapy Education       Title: PT OT SLP Therapies (Done)       Topic: Physical Therapy (Done)       Point: Mobility training (Done)       Learning Progress Summary             Patient Acceptance, E,TB, VU by NURIS at 10/24/2023 1015                         Point: Home exercise program (Done)       Learning Progress Summary             Patient Acceptance, E,TB, VU by NURIS at 10/24/2023 1015                         Point: Body mechanics (Done)       Learning Progress Summary             Patient Acceptance, E,TB, VU by NURIS at 10/24/2023 1015                         Point: Precautions (Done)       Learning Progress Summary             Patient Acceptance, E,TB, VU by NURIS at 10/24/2023 1015                                         User Key       Initials  Effective Dates Name Provider Type Discipline     09/12/23 -  Chriss Contreras, PT Student PT Student PT                  PT Recommendation and Plan     Plan of Care Reviewed With: (P) patient  Outcome Evaluation: (P) Pt is a 59 yo. male admited from home w/ L sided numbness. He has hx of L. cerebellar stroke, L-sided weakness and dizziness at baseline. Pt lives alone where he is ind for all household mobility and no AD. He reports recent hx of falls - 1x at grocery store and multiple falls at home at night under low lighting. Pt presents to physical therapy w/ generalized weakness and coordination impairments that are present at BL and limiting to functional mobliity. Pt performed bed mobility and STS w/ SV and no AD and ambulated 400' w/ CGA and no AD. Pt presents ataxic gait w/ compensations for L-sided weakness that pt reports at BL. Pt appears unsteady but had no LOB. Pt returned to bed w/ needs met. Pt agrees he does not require skilled acute PT and PT w/ s/o as pt mobilizes at recent BL and anticipated DC home w/ OPPT - pt has apts scheduled in advance.     Time Calculation:   PT Evaluation Complexity  History, PT Evaluation Complexity: (P) 3 or more personal factors and/or comorbidities  Examination of Body Systems (PT Eval Complexity): (P) total of 3 or more elements  Clinical Presentation (PT Evaluation Complexity): (P) stable  Clinical Decision Making (PT Evaluation Complexity): (P) low complexity  Overall Complexity (PT Evaluation Complexity): (P) low complexity     PT Charges       Row Name 10/24/23 1016             Time Calculation    Start Time 0850 (P)   -KW      Stop Time 0905 (P)   -KW      Time Calculation (min) 15 min (P)   -KW      PT Received On 10/24/23 (P)   -KW         Time Calculation- PT    Total Timed Code Minutes- PT 10 minute(s) (P)   -KW         Timed Charges    65177 - Gait Training Minutes  10 (P)   -KW         Untimed Charges    PT Eval/Re-eval Minutes 5 (P)   -KW         Total Minutes     Timed Charges Total Minutes 10 (P)   -KW      Untimed Charges Total Minutes 5 (P)   -KW       Total Minutes 15 (P)   -KW                User Key  (r) = Recorded By, (t) = Taken By, (c) = Cosigned By      Initials Name Provider Type    Chriss Law, PT Student PT Student                  Therapy Charges for Today       Code Description Service Date Service Provider Modifiers Qty    16496885670 HC PT EVAL LOW COMPLEXITY 2 10/24/2023 Chriss Contreras, PT Student GP 1    [id number]  GAIT TRAINING EA 15 MIN 10/24/2023 Chriss Contreras, PT Student GP 1            PT G-Codes  AM-PAC 6 Clicks Score (PT): (P) 22  PT Discharge Summary  Anticipated Discharge Disposition (PT): (P) home with outpatient therapy services    CARIN Sullivan  10/24/2023

## 2023-10-24 NOTE — PROGRESS NOTES
Discharge Planning Assessment  [facility]     Patient Name: Flako Coreas  MRN: [id number]  Today's Date: 10/24/2023    Admit Date: 10/23/2023    Plan: Home, follow for needs   Discharge Needs Assessment       Row Name 10/24/23 1437       Living Environment    People in Home alone    Current Living Arrangements home    Primary Care Provided by self    Provides Primary Care For no one    Family Caregiver if Needed child(temo), adult    Family Caregiver Names son Flako [phone]    Quality of Family Relationships helpful;involved;supportive    Able to Return to Prior Arrangements yes       Resource/Environmental Concerns    Resource/Environmental Concerns none       Food Insecurity    Within the past 12 months, you worried that your food would run out before you got the money to buy more. Never true    Within the past 12 months, the food you bought just didn't last and you didn't have money to get more. Never true       Transition Planning    Patient/Family Anticipates Transition to home    Patient/Family Anticipated Services at Transition none    Transportation Anticipated family or friend will provide       Discharge Needs Assessment    Equipment Currently Used at Home none    Concerns to be Addressed discharge planning    Discharge Coordination/Progress Home                   Discharge Plan       Row Name 10/24/23 0467       Plan    Plan Home, follow for needs    Patient/Family in Agreement with Plan yes    Plan Comments CCP following to assist with d/c planning. Pt resides alone in an apartment with one flight exterior steps, uses no DME, has been to [facility] for past rehab, and has no HH history. Pt enrolled in [facility] for d/c prescriptions. PT recommends outpatient therapy (ambulating 400' without assistive device). CCP to follow. Sophia Fields LCSW                  Continued Care and Services - Admitted Since 10/23/2023    Coordination has not been started for this encounter.       Selected  Continued Care - Prior Encounters Includes continued care and service providers with selected services from prior encounters from 7/25/2023 to 10/24/2023      Discharged on 8/24/2023 Admission date: 8/19/2023 - Discharge disposition: Skilled Nursing Facility (DC - External)      Destination       Service Provider Selected Services Address Phone Fax Patient Preferred    [facility] [address] [id number] [phone] --                          Expected Discharge Date and Time       Expected Discharge Date Expected Discharge Time    Oct 25, 2023            Demographic Summary       Row Name 10/24/23 1436       General Information    Admission Type inpatient    Arrived From home    Referral Source admission list    Reason for Consult discharge planning    Preferred Language English                   Functional Status       Row Name 10/24/23 1436       Functional Status    Usual Activity Tolerance good    Current Activity Tolerance good       Physical Activity    On average, how many days per week do you engage in moderate to strenuous exercise (like a brisk walk)? 5 days    On average, how many minutes do you engage in exercise at this level? 60 min    Number of minutes of exercise per week 300       Functional Status, IADL    Medications independent    Meal Preparation independent    Housekeeping independent    Laundry independent    Shopping independent       Mental Status Summary    Recent Changes in Mental Status/Cognitive Functioning no changes                   Psychosocial    No documentation.                  Abuse/Neglect    No documentation.                  Legal    No documentation.                  Substance Abuse    No documentation.                  Patient Forms    No documentation.                     Yamilet Fields LCSW

## 2023-10-24 NOTE — PROGRESS NOTES
"DOS: 10/24/2023  NAME: Flako Coreas   : 1965  PCP: Akash Rodriguez Jr., DO    Chief Complaint   Patient presents with    Numbness        Stroke    Subjective: Pt seen in follow up, however the problem is new to me.  Patient lying in bed resting comfortably.  States symptoms are still the same as yesterday.  No new weakness, numbness, speech or visual disturbances, or headaches.  No family at bedside.    Objective:  Vital signs:      Vitals:    10/23/23 2152 10/24/23 0502 10/24/23 0832 10/24/23 1300   BP: 121/77 139/83 136/92 136/86   BP Location: Left arm Left arm Left arm Left arm   Patient Position: Lying Lying Lying Lying   Pulse: 55 53 59    Resp: 18 16 18 18   Temp: 97.9 °F (36.6 °C) 97.5 °F (36.4 °C) 98.1 °F (36.7 °C) 97.9 °F (36.6 °C)   TempSrc: Oral Oral Oral Oral   SpO2: 96% 96% 96%    Weight: 111 kg (245 lb 9.5 oz)      Height: 188 cm (74\")          Current Facility-Administered Medications:     amLODIPine (NORVASC) tablet 5 mg, 5 mg, Oral, Daily, Sravanthi Heath APRN, 5 mg at 10/24/23 0919    aspirin tablet 325 mg, 325 mg, Oral, Daily, 325 mg at 10/24/23 0919 **OR** aspirin suppository 300 mg, 300 mg, Rectal, Daily, Bladimir Bledsoe MD    atorvastatin (LIPITOR) tablet 80 mg, 80 mg, Oral, Nightly, Sravanthi Heath APRN    carvedilol (COREG) tablet 6.25 mg, 6.25 mg, Oral, BID With Meals, Sravanthi Heath APRN, 6.25 mg at 10/24/23 0919    clopidogrel (PLAVIX) tablet 75 mg, 75 mg, Oral, Daily, Bladimir Bledsoe MD, 75 mg at 10/24/23 0919    famotidine (PEPCID) tablet 20 mg, 20 mg, Oral, BID AC, Sebastian Gonsalez MD    hydrALAZINE (APRESOLINE) tablet 100 mg, 100 mg, Oral, Q8H, Sravanthi Heath APRN, 100 mg at 10/24/23 1417    hydroCHLOROthiazide (HYDRODIURIL) tablet 12.5 mg, 12.5 mg, Oral, Daily, Sravanthi Heath APRN, 12.5 mg at 10/24/23 0919    hydrOXYzine (ATARAX) tablet 25 mg, 25 mg, Oral, TID PRN, Sravanthi Heath APRN, 25 mg at 10/24/23 1417    influenza vac split " quad (FLUZONE,FLUARIX,AFLURIA,FLULAVAL) injection 0.5 mL, 0.5 mL, Intramuscular, During Hospitalization, Sebastian Gonsalez MD    lisinopril (PRINIVIL,ZESTRIL) tablet 40 mg, 40 mg, Oral, Daily, Sravanthi Heath, APRN, 40 mg at 10/24/23 0919    meclizine (ANTIVERT) tablet 25 mg, 25 mg, Oral, TID PRN, Sravanthi Heath APRN    sodium chloride 0.9 % flush 10 mL, 10 mL, Intravenous, PRN, Bjorn Sin MD    sodium chloride 0.9 % flush 10 mL, 10 mL, Intravenous, Q12H, Bladimir Bledsoe MD, 10 mL at 10/24/23 0920    sodium chloride 0.9 % flush 10 mL, 10 mL, Intravenous, PRN, Bladimir Bledsoe MD    sodium chloride 0.9 % infusion 40 mL, 40 mL, Intravenous, PRN, Bladimir Bledsoe MD    spironolactone (ALDACTONE) tablet 25 mg, 25 mg, Oral, Daily, Sravanthi Heath, APRN, 25 mg at 10/24/23 0923    vilazodone (VIIBRYD) tablet 40 mg, 40 mg, Oral, Daily, Sravanthi Heath APRN, 40 mg at 10/24/23 0919    PRN meds    hydrOXYzine    influenza vaccine    meclizine    sodium chloride    sodium chloride    sodium chloride    No current facility-administered medications on file prior to encounter.     Current Outpatient Medications on File Prior to Encounter   Medication Sig    amLODIPine (NORVASC) 10 MG tablet Take 0.5 tablets by mouth Daily.    aspirin 81 MG EC tablet Take 1 tablet by mouth Daily.    atorvastatin (LIPITOR) 40 MG tablet Take 1 tablet by mouth Every Night. (Patient taking differently: Take 2 tablets by mouth Every Night.)    carvedilol (COREG) 6.25 MG tablet Take 1 tablet by mouth 2 (Two) Times a Day With Meals.    hydrALAZINE (APRESOLINE) 100 MG tablet Take 1 tablet by mouth Every 8 (Eight) Hours.    hydroCHLOROthiazide (HYDRODIURIL) 12.5 MG tablet Take 1 tablet by mouth Daily.    hydrOXYzine (ATARAX) 25 MG tablet Take 1 tablet by mouth 3 (Three) Times a Day As Needed for Anxiety.    lisinopril (PRINIVIL,ZESTRIL) 40 MG tablet Take 1 tablet by mouth Daily.    meclizine (ANTIVERT) 25 MG tablet     spironolactone  (ALDACTONE) 25 MG tablet Take 1 tablet by mouth Daily.    vilazodone (Viibryd) 40 MG tablet tablet Take 1 tablet by mouth Daily.       General appearance: NAD, alert and cooperative, well groomed  HEENT: Normocephalic, atraumatic, PERRL, no masses or tenderness  COR: RRR  Resp: Even and unlabored  Extremities: No obvious edema  Skin: warm, dry    Neurological:   MS: oriented x3, recent/remote memory intact, normal attention/concentration, language intact, no neglect, normal fund of knowledge  CN: visual acuity grossly normal, visual fields full, PERRL, EOMI, facial sensation equal, no facial droop, hearing symmetric, palate elevates symmetrically, shoulder shrug equal, tongue midline  Motor: 5/5 in all 4 ext., normal tone  Reflexes: 1+ in all 4 ext.  Sensory: light touch sensation intact in all 4 ext.  Coordination: Normal finger to nose test, normal heel to shin test  Gait and station: no ataxia  Rapid alternating movements: normal finger to thumb tap    Laboratory results:  Lab Results   Component Value Date    TSH 0.364 08/20/2023     Lab Results   Component Value Date    HGBA1C 5.70 (H) 10/24/2023     Lab Results   Component Value Date    NEGSCJAA97 500 08/18/2023     Lab Results   Component Value Date    CHOL 127 10/24/2023    CHOL 117 08/20/2023    CHOL 194 08/16/2023    CHLPL 215 (H) 05/11/2022    CHLPL 206 (H) 03/30/2021    CHLPL 172 07/27/2018     Lab Results   Component Value Date    TRIG 88 10/24/2023    TRIG 115 08/20/2023    TRIG 194 (H) 08/16/2023     Lab Results   Component Value Date    HDL 39 (L) 10/24/2023    HDL 34 (L) 08/20/2023    HDL 41 08/16/2023     Lab Results   Component Value Date    LDL 71 10/24/2023    LDL 62 08/20/2023     (H) 08/16/2023     Lab Results   Component Value Date    WBC 8.87 10/23/2023    HGB 13.1 10/23/2023    HCT 39.1 10/23/2023    MCV 81.1 10/23/2023     10/23/2023     Lab Results   Component Value Date    GLUCOSE 131 (H) 10/23/2023    BUN 20 10/23/2023     CREATININE 1.19 10/23/2023    EGFRIFNONA 98 11/05/2018    EGFRIFAFRI >60 09/28/2018    BCR 16.8 10/23/2023    K 3.6 10/23/2023    CO2 29.0 10/23/2023    CALCIUM 9.3 10/23/2023    PROTENTOTREF 7.0 05/11/2022    ALBUMIN 4.3 10/23/2023    LABIL2 1.8 05/11/2022    AST 23 10/23/2023    ALT 29 10/23/2023     Lab Results   Component Value Date    PTT 28.9 10/23/2023     Lab Results   Component Value Date    INR 0.93 10/23/2023    INR 1.0 08/21/2023    INR 0.90 08/19/2023    PROTIME 12.6 10/23/2023    PROTIME 12.3 08/19/2023    PROTIME 12.7 12/11/2022     Brief Urine Lab Results  (Last result in the past 365 days)        Color   Clarity   Blood   Leuk Est   Nitrite   Protein   CREAT   Urine HCG        08/16/23 1529 Yellow   Clear   Negative   Negative   Negative   Negative                   Review and interpretation of imaging:  MRI Brain Without Contrast    Result Date: 10/23/2023  Electronically signed by Dawood Ace M.D. on 10-23-23 at 2248    CT Angiogram Head w AI Analysis of LVO    Result Date: 10/23/2023  Left cerebellar hemisphere infarction was demonstrated to be acute on brain MRI 08/16/2023. No evidence for hemorrhagic conversion/intracranial hemorrhage. There is delayed flow to the inferomedial left cerebral hemisphere in the region of the infarction without area of diminished cerebral blood flow. CT angiogram head and neck appears similar to the prior exam 08/19/2023. Atherosclerotic calcifications are present involving the carotid bulbs and proximal ICA's without NASCET stenosis. The left post PICA vertebral artery is very small. There is atherosclerotic disease involving the intracranial segment right vertebral artery and the basilar artery with mild to moderate narrowing. Moderate narrowing origin of the right vertebral artery. Further evaluation could be performed with brain MRI if indicated.  Findings of the noncontrast exam discussed with Dr. Rodriguez at 7:28 p.m. Findings of the contrasted exam discussed  with Dr. Rodriguez at 7:50 p.m. AI analysis of LVO was utilized.  Radiation dose reduction techniques were utilized, including automated exposure control and exposure modulation based on body size.    This report was finalized on 10/23/2023 10:00 PM by Dr. Yohannes Sagastume M.D on Workstation: [id number]      CT Angiogram Neck    Result Date: 10/23/2023  Left cerebellar hemisphere infarction was demonstrated to be acute on brain MRI 08/16/2023. No evidence for hemorrhagic conversion/intracranial hemorrhage. There is delayed flow to the inferomedial left cerebral hemisphere in the region of the infarction without area of diminished cerebral blood flow. CT angiogram head and neck appears similar to the prior exam 08/19/2023. Atherosclerotic calcifications are present involving the carotid bulbs and proximal ICA's without NASCET stenosis. The left post PICA vertebral artery is very small. There is atherosclerotic disease involving the intracranial segment right vertebral artery and the basilar artery with mild to moderate narrowing. Moderate narrowing origin of the right vertebral artery. Further evaluation could be performed with brain MRI if indicated.  Findings of the noncontrast exam discussed with Dr. Rodriguez at 7:28 p.m. Findings of the contrasted exam discussed with Dr. Rodriguez at 7:50 p.m. AI analysis of LVO was utilized.  Radiation dose reduction techniques were utilized, including automated exposure control and exposure modulation based on body size.    This report was finalized on 10/23/2023 10:00 PM by Dr. Yohannes Sagastume M.D on Workstation: [id number]      CT CEREBRAL PERFUSION WITH & WITHOUT CONTRAST    Result Date: 10/23/2023  Left cerebellar hemisphere infarction was demonstrated to be acute on brain MRI 08/16/2023. No evidence for hemorrhagic conversion/intracranial hemorrhage. There is delayed flow to the inferomedial left cerebral hemisphere in the region of the infarction without area of diminished cerebral blood  flow. CT angiogram head and neck appears similar to the prior exam 08/19/2023. Atherosclerotic calcifications are present involving the carotid bulbs and proximal ICA's without NASCET stenosis. The left post PICA vertebral artery is very small. There is atherosclerotic disease involving the intracranial segment right vertebral artery and the basilar artery with mild to moderate narrowing. Moderate narrowing origin of the right vertebral artery. Further evaluation could be performed with brain MRI if indicated.  Findings of the noncontrast exam discussed with Dr. Rodriguez at 7:28 p.m. Findings of the contrasted exam discussed with Dr. Rodriguez at 7:50 p.m. AI analysis of LVO was utilized.  Radiation dose reduction techniques were utilized, including automated exposure control and exposure modulation based on body size.    This report was finalized on 10/23/2023 10:00 PM by Dr. Yohannes Sagastume M.D on Workstation: [id number]      XR Chest 1 View    Result Date: 10/23/2023  1. No acute process.  This report was finalized on 10/23/2023 7:57 PM by Dr. Salvador Arita M.D on Workstation: [id number]     Results for orders placed during the hospital encounter of 08/19/23    Adult Transesophageal Echo (MEGHANN) W/ Cont if Necessary Per Protocol    Interpretation Summary    Left ventricular systolic function is hyperdynamic (EF > 70%).    Left ventricular wall thickness is consistent with mild to moderate concentric hypertrophy.    Left ventricular diastolic function is consistent with (grade I) impaired relaxation.    Normal right ventricular cavity size and systolic function noted.    The left atrial cavity is mildly dilated.    No evidence of a left atrial appendage thrombus was present    No evidence of a patent foramen ovale. Saline test results are negative.    There are very mild plaques in the distal descending thoracic aorta. There are no plaques in the aortic arch or ascending aorta    There is no evidence of pericardial  effusion      Impression/Assessment:  This is a 58-year-old male with past medical history of hypertension, hyperlipidemia, anxiety and depression, recent left cerebellar strokes in August after he was found to have a critical left vert stenosis and was placed on DAPT with instructions to continue for 21 days and then aspirin monotherapy along with a statin who presented to the hospital on 10/20/2023 with complaints of left facial and hand numbness and tingling.  He states he has residual diplopia and ataxia from his prior stroke.    His CT perfusion showed a larger deficit than last August but unchanged CTA with a critical stenosis versus occlusion at the vertebral.  He was offered tPA/TNK but he deferred given the risk of bleeding and death compared to the minimal risk untreated.  He was loaded with Plavix 300 mg and then started on Plavix 75 mg thereafter.  MRI brain without obtained and revealed a subacute left cerebellar infarct and a chronic right thalamic infarct.    BP on arrival 140/82, HR 74.  EKG with NSR.  Temp 96.8.  .    Diagnosis:  Subacute left cerebellar infarct  Left vertebral occlusion  History of left cerebellar infarcts  Essential hypertension  Dyslipidemia with goal LDL 70 or less    Plan:  Symptoms stable.   Checked P2Y12 this morning and does show he is a Plavix non-responder (212) therefore will start him on Brilinta 60mg BID, no load as stroke is subacute.   Continue ASA with the Brilinta with plans to continue both for 90 days and then Brilinta monotherapy.  Lipitor 80 mg, LDL 71  Neurochecks per stroke protocol  Normalize BP, avoid hypotension  Stroke Education  AARON/SCDs  PT/OT/ST  F/U already scheduled for outpatient follow up with us.  Therapies as written. CCP for discharge planning. Call RRT for any acute neurological changes.   We will sign off, will see again per request.    Case discussed with patient and Dr. Bledsoe, and he agrees with plan above.     JANEEN Brooks

## 2023-10-24 NOTE — SIGNIFICANT NOTE
10/24/23 1220   OTHER   Discipline occupational therapist   Rehab Time/Intention   Session Not Performed patient/family declined evaluation  (Pt reports no needs for OT at this time, reports up ab chinyere and has chronic LLE weakness)

## 2023-10-24 NOTE — CONSULTS
NEUROLOGY CONSULT - STROKE TEAM    DOS: 10/23/2023  NAME: Flako Coreas   : 1965  PCP: Akash Rodriguez Jr., DO  CC: Stroke  Referring MD: No ref. provider found  Patient being seen at request of Dr. Sin for advice and opinion on numbness.    Neurological Problem and Interval History:  58 y.o. male presents with chief complaint of: face numbness.    58m with a left cerebellar stroke and a critical left vert stenosis potentially ending in PICA in 2023 presents with a 1630 last known well sudden onset of left face numbness and tingling and left hand tingling. Patient feels like there is a sharp line down his face and also feels numbness inside his mouth and along his teeth. No motor symptoms. He has a baseline mild double vision from his stroke in August. He was on aspirin and plavix but recently came off the plavix. Blood pressure has been well controlled. Patient does not smoke cigarettes or vape.       Past Medical/Surgical Hx:  Past Medical History:   Diagnosis Date    ADHD (attention deficit hyperactivity disorder)     On going issue    Anxiety     Lepe's esophagus     Benign prostatic hyperplasia Surgery in 2021    Clotting disorder Intestinal    Depression     Diverticulitis     Diverticulosis     Duodenitis     Enteritis     Failure to thrive (child)     Family history of blood clots     GERD (gastroesophageal reflux disease)     Hyperlipidemia     Hypertension     Hypertensive emergency     Low testosterone     Microcytosis     Pancreatitis     Pneumonia     PONV (postoperative nausea and vomiting)     Seasonal affective disorder     Shoulder injury     Stroke     Unexplained weight loss      Past Surgical History:   Procedure Laterality Date    COLON SURGERY      COLONOSCOPY      COLONOSCOPY N/A 2022    Procedure: COLONOSCOPY INTO CECUM WITH HOT SNARE POLYPECTOMY;  Surgeon: Albert Gallo MD;  Location: [facility] ENDOSCOPY;  Service: Gastroenterology;  Laterality:  N/A;  PRE- GI BLEED  POST- DIVERTICULOSIS, POLYP    ENDOSCOPY N/A 12/10/2022    Procedure: ESOPHAGOGASTRODUODENOSCOPY;  Surgeon: Albert Gallo MD;  Location: [facility] ENDOSCOPY;  Service: Gastroenterology;  Laterality: N/A;  PRE- DARK STOOLS  POST- BARRETTS ESOPHAGUS    FRACTURE SURGERY  1980    for broken arm    FRACTURE SURGERY      for broken hand    INCISION AND DRAINAGE ABSCESS  2015    anal    PROSTATE SURGERY      SMALL INTESTINE SURGERY      TONSILLECTOMY         Review of Systems:        No fever, sweats or chills,  No neck pain, back pain or joint pain  No loss of hearing or tinnitus  No blurry or double vision  No rashes or edema  No chest pain or palpitations  No shortness of breath or wheezing  No diarrhea or constipation  No urinary incontinence or dysuria  No seizures or syncope  No depression or anxiety    Medications On Admission  (Not in a hospital admission)      Allergies:  No Known Allergies    Social Hx:  Social History     Socioeconomic History    Marital status: Single   Tobacco Use    Smoking status: Never    Smokeless tobacco: Never   Vaping Use    Vaping Use: Never used   Substance and Sexual Activity    Alcohol use: Yes     Alcohol/week: 20.0 standard drinks of alcohol     Types: 10 Cans of beer, 10 Shots of liquor per week     Comment: pt states very rarely    Drug use: Yes     Frequency: 1.0 times per week     Types: Marijuana     Comment: Pt states he may have smoked marijuana 3 weeks ago (stated on 11-07-18)    Sexual activity: Not Currently     Partners: Female       Family Hx:  Family History   Problem Relation Age of Onset    Stroke Mother     Stroke Father        Review of Imaging (Interpretation of images not reports):      Laboratory Results:   Lab Results   Component Value Date    GLUCOSE 131 (H) 10/23/2023    CALCIUM 9.3 10/23/2023     10/23/2023    K 3.6 10/23/2023    CO2 29.0 10/23/2023     10/23/2023    BUN 20 10/23/2023    CREATININE 1.19 10/23/2023     "EGFRIFAFRI >60 09/28/2018    EGFRIFNONA 98 11/05/2018    BCR 16.8 10/23/2023    ANIONGAP 9.0 10/23/2023     Lab Results   Component Value Date    WBC 8.87 10/23/2023    HGB 13.1 10/23/2023    HCT 39.1 10/23/2023    MCV 81.1 10/23/2023     10/23/2023     Lab Results   Component Value Date    CHOL 117 08/20/2023    CHOL 194 08/16/2023     Lab Results   Component Value Date    HDL 34 (L) 08/20/2023    HDL 41 08/16/2023    HDL 46 05/11/2022     Lab Results   Component Value Date    LDL 62 08/20/2023     (H) 08/16/2023     (H) 05/11/2022     Lab Results   Component Value Date    TRIG 115 08/20/2023    TRIG 194 (H) 08/16/2023    TRIG 149 05/11/2022     Lab Results   Component Value Date    HGBA1C 5.20 08/16/2023     Lab Results   Component Value Date    INR 0.93 10/23/2023    INR 1.0 08/21/2023    INR 0.90 08/19/2023    PROTIME 12.6 10/23/2023    PROTIME 12.3 08/19/2023    PROTIME 12.7 12/11/2022         Physical Examination:   /74 (BP Location: Left arm, Patient Position: Lying)   Pulse 56   Temp 96.8 °F (36 °C) (Tympanic)   Resp 18   Ht 188 cm (74\")   Wt 113 kg (249 lb 3.2 oz)   SpO2 93%   BMI 32.00 kg/m²   General Appearance:   Well developed, well nourished, well groomed, alert, and cooperative.  HEENT: Normocephalic. Atraumatic. No JVD, no tracheal deviation.  Neck and Spine: Normal range of motion.  Normal alignment. No mass or tenderness.   Extremities:    No edema or deformities.   Skin:    No rashes or discoloration    Neurological examination:  Higher Integrative  Function: Oriented to time, place and person. Normal registration, recall, attention span and concentration. Normal language including comprehension, spontaneous speech, repetition, naming and vocabulary. No neglect. Normal fund of knowledge and higher integrative function.  CN II: Pupils are equal, round, and reactive to light. Blinks to visual threat and counts fingers in all quiros. No Horners  CN III IV " VI: Extraocular movements are full without nystagmus. Complains of midline diplopia which does not worsen towards either side  CN V: Marked left facial numbness although curiously patient reports symmetric temperature sensation. Numbness is in a sharp line down the center of his face and lightly involves V1. Also involves his inner mouth and teeth on the left side.   CN VII: Facial movements are symmetric. No labial dysarthria  CN VIII:   Auditory acuity is normal.  CN IX & X:   No palatal or pharnygeal dysarthria.  CN XI: Turns head without abnormality.   CN XII:   The tongue is midline.  No lingual dysarthria.   Motor: Normal muscle strength, bulk and tone in upper and lower extremities.  No fasciculations, rigidity, spasticity, or abnormal movements.  Reflexes: 2+ in the upper and lower extremities. Plantar responses are flexor.  Sensation: Normal to light touch, temperature, and proprioception in arms and legs.   Station and Gait: Deferred for bed rest    Coordination: Finger-to-nose test shows no dysmetria.  Rapid alternating movements are normal.  Heel-to-shin normal.    NIHSS:     Diagnoses / Discussion:  58 y.o. who presents with Sx of left brainstem stroke.  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 Patient's CT Perfusion shows a larger deficit than last August but unchanged CTA with a critical stenosis at the vertebral although it's possible that his left PICA occluded tonight. He has an NIHSS of 2 from double vision which is old and 1 for sensory which is new. I offered tPA/TNK but patient deferred given the risks of bleeding and death compared to the minimal risk untreated. Decided to load with plavix and obtain MRI to assess. It's theoretically possible that patient had a distal right thalamic emboli but usually there is some midline overlap in sensation across the face above the level of the decussation.     Plan:  Plavix load. MRI     I have discussed the above with the  patient   Time spent with patient: 60min    MDM  Reviewed: previous chart, nursing note and vitals  Reviewed previous: labs, MRI and CT scan  Interpretation: CT scan, MRI and labs  Total time providing critical care: 30-74 minutes. This excludes time spent performing separately reportable procedures and services.         Bladimir Bledsoe MD  Neurology

## 2023-10-24 NOTE — PLAN OF CARE
Goal Outcome Evaluation:  Plan of Care Reviewed With: patient        Progress: improving  Outcome Evaluation: Continues to have left leg weakness and left face/arm tingling. NIH 2. VSS. Up ad chinyere. MRI completed

## 2023-10-24 NOTE — PAYOR COMM NOTE
"Nimesh Coreas (58 y.o. Male)     PLEASE SEE ATTACHED FOR INPT AUTH     REF #  [id number]    PLEASE CALL JOSE DE LA TORRE RN/ DEPT @ [phone]  OR FAX  DEPARTMENT @  [phone]    THANK YOU   JOSE DE LA TORRE RN  [facility]           Date of Birth   1965    Social Security Number       Address   [facility] [address] [address]    Home Phone   [phone]    MRN   [id number]       Shinto   Zoroastrian    Marital Status   Single                            Admission Date   10/23/23    Admission Type   Emergency    Admitting Provider   Sebastian Gonsalez MD    Attending Provider   Sebastian Gonsalez MD    Department, Room/Bed   [address], P585/1       Discharge Date       Discharge Disposition       Discharge Destination                                 Attending Provider: Sebastian Gonsalez MD    Allergies: No Known Allergies    Isolation: None   Infection: None   Code Status: Prior    Ht: 188 cm (74\")   Wt: 111 kg (245 lb 9.5 oz)    Admission Cmt: None   Principal Problem: 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 [I63.542]                   Active Insurance as of 10/23/2023       Primary Coverage       Payor Plan Insurance Group Employer/Plan Group    [facility] COMMUNITY PLAN OF [facility] COMMUNITY PLAN [facility] [place]       Payor Plan Address Payor Plan Phone Number Payor Plan Fax Number Effective Dates    [address]   1/1/2021 - None Entered    [facility] [id number]         Subscriber Name Subscriber Birth Date Member ID       NIMESH COREAS 1965 [id number]               Secondary Coverage       Payor Plan Insurance Group Employer/Plan Group    [employer] PPO [id number]       Payor Plan Address Payor Plan Phone Number Payor Plan Fax Number Effective Dates    [id number] [phone]  1/1/2023 - None Entered    [facility] [id number]         Subscriber Name Subscriber Birth Date Member " ID       FLAKO ROCHA 1965 [id number]                     Emergency Contacts        (Rel.) Home Phone Work Phone Mobile Phone    Flako Rocha (Son) [phone] -- [phone]              [place]: NPI [id number]  Tax ID [id number]     History & Physical        Sravanthi Heath, JANEEN at 10/24/23 0452       Attestation signed by Sebastian Gonsalez MD at 10/24/23 0963    I have reviewed this documentation and agree.    I also went to bedside to mount history and physical exam.  Nothing too much more to add to the subjective portion.  He currently is feeling better at this time.  He notes that he has numbness and tingling still on the left side of his face as well as his left fingertips.  Denies any dysphagia nausea vomiting.  He formally was on Plavix for 21 days and states that was discontinued and he is maintained on aspirin and statin prior to admission.  Per review of MAR, I can see patient has been loaded with Plavix 300 mg.  Given the cerebrovascular disease noted on CTA, dual antiplatelet therapy is likely of benefit  The MRI shows that this CVA was more subacute so we will continue with blood pressure medications.  Current BP is 136/92.  Neurology consult has been placed and will await further recommendations.      98.1, 53, 18, 136/92, 96% room air  Alert and oriented x3, resting comfortably in bed, fluent speech, nontoxic, animated and conversational.  No family at bedside  NCAT PERRLA EOMI ENT grossly clear no JVD  S1-S2 RRR CTA B no increased work of breathing  Soft nontender nondistended positive bowel sounds  Decreased sensitivity left upper extremity but motor seems totally intact at 5 out of 5  I did not evaluate gait, no signs of volume overload or swelling  Cranial nerves otherwise intact    Labs/chart reviewed        Previous CVA with recurrent CVA/subacute with cerebrovascular disease  -ASA and Plavix and high-dose statin as we await further neuro  consultation    Continue medications for hypertension -Coreg, amlodipine, hydralazine, HCTZ and lisinopril as well as spironolactone    GI prophylaxis and Pepcid    SCDs for DVT prophylaxis      Further recommendations to follow as clinical course unfolds                    Patient Name:  Flako Coreas  YOB: 1965  MRN:  [id number]  Admit Date:  10/23/2023  Patient Care Team:  Akash Rodriguez Jr., DO as PCP - General (Sports Medicine)      Subjective  History Present Illness     Chief Complaint   Patient presents with    Numbness     History of Present Illness  Mr. Coreas is a 58-year-old male with history of hyperlipidemia, CVA, GERD who presents to the emergency room with facial numbness.  Patient was recently admitted to the hospital from August 16 August 24 and at that time was found to be hypertensive and had an acute CVA and was placed on Plavix, however he states that he stopped taking the Plavix, his understanding was he was only supposed to take it for 21 days.  He has some residual double vision that has been the same but he had some new onset of left-sided facial numbness as well as some left hand numbness.  These symptoms started about 4:30 PM prior to arrival to the emergency room.  He denies having any recent illnesses or fevers, noted no trouble with speech or swallowing and knows no facial droop.  He denies any weakness or difficulty walking.  In the emergency room his troponin was 8, sodium 143, potassium 3.6, creatinine 1.1, blood glucose 131, INR 1.93, white blood cell count 8.8, hemoglobin 13.1, hematocrit 39.1, platelets 269.  Chest x-ray showed no acute process CTA of his head neck showed no evidence of hemorrhagic conversion, there is delayed flow to the inferior medial left cerebral hemisphere in the region of the infarction without area of diminished cerebral blood flow.  CT angio of the head and neck appear similar to prior exam on 8/19/2023.  The left post PICA  vertebral artery is very small.  There is moderate narrowing origin of the right vertebral artery.  Patient was seen in the emergency room by Dr. Bledsoe with stroke neurology and was opted not to have tPA/TNK given the risk of bleeding versus symptoms.  He did get a loading dose of Plavix and his plan for further work-up by neurology.    Review of Systems   Constitutional:  Negative for appetite change and fever.   HENT:  Negative for nosebleeds and trouble swallowing.    Eyes:  Positive for visual disturbance. Negative for photophobia and redness.   Respiratory:  Negative for cough, chest tightness, shortness of breath and wheezing.    Cardiovascular:  Negative for chest pain, palpitations and leg swelling.   Gastrointestinal:  Negative for abdominal distention, abdominal pain, nausea and vomiting.   Endocrine: Negative.    Genitourinary: Negative.    Musculoskeletal:  Negative for gait problem and joint swelling.   Skin: Negative.    Neurological:  Positive for numbness. Negative for dizziness, seizures, speech difficulty, light-headedness and headaches.   Hematological: Negative.    Psychiatric/Behavioral:  Negative for behavioral problems and confusion.         Personal History     Past Medical History:   Diagnosis Date    ADHD (attention deficit hyperactivity disorder)     On going issue    Anxiety     Lepe's esophagus     Benign prostatic hyperplasia Surgery in 12/2021    Clotting disorder Intestinal    Depression     Diverticulitis     Diverticulosis     Duodenitis     Enteritis     Failure to thrive (child)     Family history of blood clots     GERD (gastroesophageal reflux disease)     Hyperlipidemia     Hypertension     Hypertensive emergency     Low testosterone     Microcytosis     Pancreatitis     Pneumonia     PONV (postoperative nausea and vomiting)     Seasonal affective disorder     Shoulder injury     Stroke     Unexplained weight loss      Past Surgical History:   Procedure Laterality Date     COLON SURGERY      COLONOSCOPY      COLONOSCOPY N/A 12/11/2022    Procedure: COLONOSCOPY INTO CECUM WITH HOT SNARE POLYPECTOMY;  Surgeon: Albert Gallo MD;  Location:  [facility];  Service: Gastroenterology;  Laterality: N/A;  PRE- GI BLEED  POST- DIVERTICULOSIS, POLYP    ENDOSCOPY N/A 12/10/2022    Procedure: ESOPHAGOGASTRODUODENOSCOPY;  Surgeon: Albert Gallo MD;  Location:  [facility];  Service: Gastroenterology;  Laterality: N/A;  PRE- DARK STOOLS  POST- BARRETTS ESOPHAGUS    FRACTURE SURGERY  1980    for broken arm    FRACTURE SURGERY      for broken hand    INCISION AND DRAINAGE ABSCESS  2015    anal    PROSTATE SURGERY      SMALL INTESTINE SURGERY      TONSILLECTOMY       Family History   Problem Relation Age of Onset    Stroke Mother     Stroke Father      Social History     Tobacco Use    Smoking status: Never    Smokeless tobacco: Never   Vaping Use    Vaping Use: Never used   Substance Use Topics    Alcohol use: Yes     Alcohol/week: 20.0 standard drinks of alcohol     Types: 10 Cans of beer, 10 Shots of liquor per week     Comment: pt states very rarely    Drug use: Yes     Frequency: 1.0 times per week     Types: Marijuana     Comment: Pt states he may have smoked marijuana 3 weeks ago (stated on 11-07-18)     No current facility-administered medications on file prior to encounter.     Current Outpatient Medications on File Prior to Encounter   Medication Sig Dispense Refill    amLODIPine (NORVASC) 10 MG tablet Take 0.5 tablets by mouth Daily.      aspirin 81 MG EC tablet Take 1 tablet by mouth Daily. 90 tablet 1    atorvastatin (LIPITOR) 40 MG tablet Take 1 tablet by mouth Every Night. (Patient taking differently: Take 2 tablets by mouth Every Night.) 90 tablet 1    carvedilol (COREG) 6.25 MG tablet Take 1 tablet by mouth 2 (Two) Times a Day With Meals. 180 tablet 1    hydrALAZINE (APRESOLINE) 100 MG tablet Take 1 tablet by mouth Every 8 (Eight) Hours. 270 tablet 1     hydroCHLOROthiazide (HYDRODIURIL) 12.5 MG tablet Take 1 tablet by mouth Daily. 30 tablet 1    hydrOXYzine (ATARAX) 25 MG tablet Take 1 tablet by mouth 3 (Three) Times a Day As Needed for Anxiety. 90 tablet 0    lisinopril (PRINIVIL,ZESTRIL) 40 MG tablet Take 1 tablet by mouth Daily. 90 tablet 1    meclizine (ANTIVERT) 25 MG tablet       spironolactone (ALDACTONE) 25 MG tablet Take 1 tablet by mouth Daily. 90 tablet 1    vilazodone (Viibryd) 40 MG tablet tablet Take 1 tablet by mouth Daily. 90 tablet 1     No Known Allergies    Objective   Objective     Vital Signs  Temp:  [96.8 °F (36 °C)-97.9 °F (36.6 °C)] 97.9 °F (36.6 °C)  Heart Rate:  [53-74] 55  Resp:  [18] 18  BP: (121-140)/(74-82) 121/77  SpO2:  [92 %-96 %] 96 %  on   ;   Device (Oxygen Therapy): room air  Body mass index is 31.53 kg/m².    Physical Exam  Vitals and nursing note reviewed.   Constitutional:       General: He is not in acute distress.     Appearance: He is well-developed.   HENT:      Head: Normocephalic.   Neck:      Vascular: No JVD.   Cardiovascular:      Rate and Rhythm: Normal rate and regular rhythm.      Comments: Normal sinus rhythm on the monitor heart rate 72 during my exam, no chest pain or shortness of breath  Pulmonary:      Effort: Pulmonary effort is normal.      Breath sounds: Normal breath sounds.      Comments: Lung sounds clear  Abdominal:      General: There is no distension.      Palpations: Abdomen is soft.      Tenderness: There is no abdominal tenderness.   Musculoskeletal:         General: Normal range of motion.      Cervical back: Normal range of motion.   Skin:     General: Skin is warm and dry.      Capillary Refill: Capillary refill takes less than 2 seconds.   Neurological:      Mental Status: He is alert and oriented to person, place, and time.      Comments: Patient still complains of some left facial numbness, no facial droop, denies any numbness in his hand.   Psychiatric:         Behavior: Behavior normal.          Results Review:  I reviewed the patient's new clinical results.  I reviewed the patient's new imaging results and agree with the interpretation.  I reviewed the patient's other test results and agree with the interpretation  I personally viewed and interpreted the patient's EKG/Telemetry data  Discussed with ED provider.    Lab Results (last 24 hours)       Procedure Component Value Units Date/Time    POC Glucose Once [[id number]]  (Abnormal) Collected: 10/23/23 1900    Specimen: Blood Updated: 10/23/23 1902     Glucose 141 mg/dL     CBC & Differential [[id number]]  (Abnormal) Collected: 10/23/23 1909    Specimen: Blood Updated: 10/23/23 1923    Narrative:      The following orders were created for panel order CBC & Differential.  Procedure                               Abnormality         Status                     ---------                               -----------         ------                     CBC Auto Differential[[id number]]        Abnormal            Final result                 Please view results for these tests on the individual orders.    Comprehensive Metabolic Panel [[id number]]  (Abnormal) Collected: 10/23/23 1909    Specimen: Blood Updated: 10/23/23 1938     Glucose 131 mg/dL      BUN 20 mg/dL      Creatinine 1.19 mg/dL      Sodium 143 mmol/L      Potassium 3.6 mmol/L      Chloride 105 mmol/L      CO2 29.0 mmol/L      Calcium 9.3 mg/dL      Total Protein 6.7 g/dL      Albumin 4.3 g/dL      ALT (SGPT) 29 U/L      AST (SGOT) 23 U/L      Alkaline Phosphatase 68 U/L      Total Bilirubin 0.3 mg/dL      Globulin 2.4 gm/dL      A/G Ratio 1.8 g/dL      BUN/Creatinine Ratio 16.8     Anion Gap 9.0 mmol/L      eGFR 71.7 mL/min/1.73     Narrative:      GFR Normal >60  Chronic Kidney Disease <60  Kidney Failure <15      Protime-INR [[id number]]  (Normal) Collected: 10/23/23 1909    Specimen: Blood Updated: 10/23/23 1931     Protime 12.6 Seconds      INR 0.93    aPTT [[id number]]  (Normal) Collected: 10/23/23  1909    Specimen: Blood Updated: 10/23/23 1931     PTT 28.9 seconds     Single High Sensitivity Troponin T [[id number]]  (Normal) Collected: 10/23/23 1909    Specimen: Blood Updated: 10/23/23 1938     HS Troponin T 8 ng/L     Narrative:      High Sensitive Troponin T Reference Range:  <10.0 ng/L- Negative Female for AMI  <15.0 ng/L- Negative Male for AMI  >=10 - Abnormal Female indicating possible myocardial injury.  >=15 - Abnormal Male indicating possible myocardial injury.   Clinicians would have to utilize clinical acumen, EKG, Troponin, and serial changes to determine if it is an Acute Myocardial Infarction or myocardial injury due to an underlying chronic condition.         CBC Auto Differential [[id number]]  (Abnormal) Collected: 10/23/23 1909    Specimen: Blood Updated: 10/23/23 1923     WBC 8.87 10*3/mm3      RBC 4.82 10*6/mm3      Hemoglobin 13.1 g/dL      Hematocrit 39.1 %      MCV 81.1 fL      MCH 27.2 pg      MCHC 33.5 g/dL      RDW 14.7 %      RDW-SD 42.7 fl      MPV 9.7 fL      Platelets 269 10*3/mm3      Neutrophil % 56.1 %      Lymphocyte % 28.7 %      Monocyte % 7.7 %      Eosinophil % 7.0 %      Basophil % 0.3 %      Immature Grans % 0.2 %      Neutrophils, Absolute 4.97 10*3/mm3      Lymphocytes, Absolute 2.55 10*3/mm3      Monocytes, Absolute 0.68 10*3/mm3      Eosinophils, Absolute 0.62 10*3/mm3      Basophils, Absolute 0.03 10*3/mm3      Immature Grans, Absolute 0.02 10*3/mm3      nRBC 0.0 /100 WBC     POC Glucose Once [[id number]]  (Abnormal) Collected: 10/23/23 2214    Specimen: Blood Updated: 10/23/23 2215     Glucose 151 mg/dL             Imaging Results (Last 24 Hours)       Procedure Component Value Units Date/Time    MRI Brain Without Contrast [[id number]] Collected: 10/23/23 2249     Updated: 10/23/23 2249    Narrative:        Patient: NIMESH ROCHA  Time Out: 22:48  Exam(s): MRI HEAD Without Contrast     EXAM:    MR Head Without Intravenous Contrast    CLINICAL HISTORY:     Reason for  exam: Stroke, follow up.    TECHNIQUE:    Magnetic resonance images of the head brain without intravenous   contrast in multiple planes.    COMPARISON:    No relevant prior studies available.    FINDINGS:    Brain: No acute stroke.  Redemonstrated now subacute nonhemorrhagic   small left cerebellar infarct.  Small old right thalamic lacunar infarct.    No acute hemorrhage or abnormal extra-axial fluid collection.  Minimal   scattered supratentorial white matter hyperintensities on FLAIR and T2-  weighted images.    Ventricles:  No midline shift.  No ventriculomegaly.    Bones joints:  Unremarkable.    Sinuses:  Unremarkable as visualized.  No acute sinusitis.    Mastoid air cells:  Unremarkable as visualized.  No mastoid effusion.    Orbits:  Unremarkable as visualized.    IMPRESSION:       No acute stroke or bleed.  Subacute small left cerebellar infarct.  Small old right thalamic lacunar infarct.  Minimal non-specific white matter changes, most commonly seen with small   vessel disease.        Impression:          Electronically signed by Dawood Ace M.D. on 10-23-23 at 2248    CT Angiogram Head w AI Analysis of LVO [[id number]] Collected: 10/23/23 2026     Updated: 10/23/23 2203    Narrative:      CT ANGIOGRAM HEAD AND NECK WITH IV CONTRAST, CT CEREBRAL PERFUSION WITH  AND WITHOUT CONTRAST.     HISTORY: Neuro deficit. Suspected acute stroke. Hand and face tingling.     TECHNIQUE: Head CT includes axial measuring from the base of skull to  the vertex without IV contrast. CT angiogram head and neck includes  axial imaging with IV contrast and data reconstructed in coronal and  sagittal planes and three-dimensional volume rendering was performed. AI  analysis of LVO was utilized. CT cerebral perfusion exam was obtained  with and without IV contrast with use of rapid software.      COMPARISON: MRI brain 09/02/2023, CT head 08/21/2023, CT angiogram head  and neck 08/19/2023, CT angiogram head and neck 08/16/2023.      FINDINGS: Noncontrasted head CT demonstrates small area of low density  within the left cerebellar hemisphere that correlates with the  inferomedial left cerebellar hemispheres infarction was demonstrated to  be acute on brain MRI 08/16/2023. There are no abnormal areas of  increased attenuation intra-axial lead to suggest hemorrhage. No  extra-axial fluid collection is observed. There is no evidence for mass  effect or shift of the midline structures. CT cerebral perfusion exam  demonstrates 0 mL where there is cerebral blood flow less than 30%.  There is delayed flow to the left cerebral hemisphere focally where  there is Tmax greater than 6 seconds to an area measuring 6 mL and this  is in the similar region as the cerebellar infarction demonstrated on  previous brain MRI.     Atherosclerotic calcifications are present involving the aortic arch.  Great vessel origins are patent. There are partially calcified plaques  involving the carotid bulbs and ICA origins similar to the previous CT  angiogram without evidence for NASCET stenosis. The distal cervical,  petrous, cavernous, supracavernous internal carotid arteries are patent.  Atherosclerotic calcifications are present involving the cavernous and  supracavernous segments of both internal carotid arteries with mild  narrowing. Both middle cerebral arteries and anterior cerebral arteries  are patent.     Right vertebral artery is larger than the left and there is moderate  narrowing at the origin of the right vertebral artery. Both cervical  vertebral arteries are patent. The intracranial, post PICA segment of  the left vertebral artery is exceedingly small though this appears  similar to the prior exam. There is mild to moderate narrowing of the  intracranial segment right vertebral artery. There is mild to moderate  narrowing of the basilar artery. Both posterior cerebral arteries are  patent. There is mostly fetal origin of the right PCA.       Impression:       Left cerebellar hemisphere infarction was demonstrated to be  acute on brain MRI 08/16/2023. No evidence for hemorrhagic  conversion/intracranial hemorrhage. There is delayed flow to the  inferomedial left cerebral hemisphere in the region of the infarction  without area of diminished cerebral blood flow. CT angiogram head and  neck appears similar to the prior exam 08/19/2023. Atherosclerotic  calcifications are present involving the carotid bulbs and proximal  ICA's without NASCET stenosis. The left post PICA vertebral artery is  very small. 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 Moderate narrowing origin of the right vertebral  artery. Further evaluation could be performed with brain MRI if  indicated.     Findings of the noncontrast exam discussed with Dr. Rodriguez at 7:28 p.m.  Findings of the contrasted exam discussed with Dr. Rodriguez at 7:50 p.m. AI  analysis of LVO was utilized.  Radiation dose reduction techniques were  utilized, including automated exposure control and exposure modulation  based on body size.           This report was finalized on 10/23/2023 10:00 PM by Dr. Yohannes Sagastume M.D on Workstation: [id number]       CT Angiogram Neck [[id number]] Collected: 10/23/23 2026     Updated: 10/23/23 2203    Narrative:      CT ANGIOGRAM HEAD AND NECK WITH IV CONTRAST, CT CEREBRAL PERFUSION WITH  AND WITHOUT CONTRAST.     HISTORY: Neuro deficit. Suspected acute stroke. Hand and face tingling.     TECHNIQUE: Head CT includes axial measuring from the base of skull to  the vertex without IV contrast. CT angiogram head and neck includes  axial imaging with IV contrast and data reconstructed in coronal and  sagittal planes and three-dimensional volume rendering was performed. AI  analysis of LVO was utilized. CT cerebral perfusion exam was obtained  with and without IV contrast with use of rapid software.       COMPARISON: MRI brain 09/02/2023, CT head 08/21/2023, CT angiogram head  and neck 08/19/2023, CT angiogram head and neck 08/16/2023.     FINDINGS: Noncontrasted head CT demonstrates small area of low density  within the left cerebellar hemisphere that correlates with the  inferomedial left cerebellar hemispheres infarction was demonstrated to  be acute on brain MRI 08/16/2023. There are no abnormal areas of  increased attenuation intra-axial lead to suggest hemorrhage. No  extra-axial fluid collection is observed. There is no evidence for mass  effect or shift of the midline structures. CT cerebral perfusion exam  demonstrates 0 mL where there is cerebral blood flow less than 30%.  There is delayed flow to the left cerebral hemisphere focally where  there is Tmax greater than 6 seconds to an area measuring 6 mL and this  is in the similar region as the cerebellar infarction demonstrated on  previous brain MRI.     Atherosclerotic calcifications are present involving the aortic arch.  Great vessel origins are patent. There are partially calcified plaques  involving the carotid bulbs and ICA origins similar to the previous CT  angiogram without evidence for NASCET stenosis. The distal cervical,  petrous, cavernous, supracavernous internal carotid arteries are patent.  Atherosclerotic calcifications are present involving the cavernous and  supracavernous segments of both internal carotid arteries with mild  narrowing. Both middle cerebral arteries and anterior cerebral arteries  are patent.     Right vertebral artery is larger than the left and there is moderate  narrowing at the origin of the right vertebral artery. Both cervical  vertebral arteries are patent. The intracranial, post PICA segment of  the left vertebral artery is exceedingly small though this appears  similar to the prior exam. There is mild to moderate narrowing of the  intracranial segment right vertebral artery. There is mild to  moderate  narrowing of the basilar artery. Both posterior cerebral arteries are  patent. There is mostly fetal origin of the right PCA.       Impression:      Left cerebellar hemisphere infarction was demonstrated to be  acute on brain MRI 08/16/2023. No evidence for hemorrhagic  conversion/intracranial hemorrhage. There is delayed flow to the  inferomedial left cerebral hemisphere in the region of the infarction  without area of diminished cerebral blood flow. CT angiogram head and  neck appears similar to the prior exam 08/19/2023. Atherosclerotic  calcifications are present involving the carotid bulbs and proximal  ICA's without NASCET stenosis. The left post PICA vertebral artery is  very small. 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 Moderate narrowing origin of the right vertebral  artery. Further evaluation could be performed with brain MRI if  indicated.     Findings of the noncontrast exam discussed with Dr. Rodriguez at 7:28 p.m.  Findings of the contrasted exam discussed with Dr. Rodriguez at 7:50 p.m. AI  analysis of LVO was utilized.  Radiation dose reduction techniques were  utilized, including automated exposure control and exposure modulation  based on body size.           This report was finalized on 10/23/2023 10:00 PM by Dr. Yohannes Sagastume M.D on Workstation: [id number]       CT CEREBRAL PERFUSION WITH & WITHOUT CONTRAST [[id number]] Collected: 10/23/23 2026     Updated: 10/23/23 2203    Narrative:      CT ANGIOGRAM HEAD AND NECK WITH IV CONTRAST, CT CEREBRAL PERFUSION WITH  AND WITHOUT CONTRAST.     HISTORY: Neuro deficit. Suspected acute stroke. Hand and face tingling.     TECHNIQUE: Head CT includes axial measuring from the base of skull to  the vertex without IV contrast. CT angiogram head and neck includes  axial imaging with IV contrast and data reconstructed in coronal and  sagittal planes and  three-dimensional volume rendering was performed. AI  analysis of LVO was utilized. CT cerebral perfusion exam was obtained  with and without IV contrast with use of rapid software.      COMPARISON: MRI brain 09/02/2023, CT head 08/21/2023, CT angiogram head  and neck 08/19/2023, CT angiogram head and neck 08/16/2023.     FINDINGS: Noncontrasted head CT demonstrates small area of low density  within the left cerebellar hemisphere that correlates with the  inferomedial left cerebellar hemispheres infarction was demonstrated to  be acute on brain MRI 08/16/2023. There are no abnormal areas of  increased attenuation intra-axial lead to suggest hemorrhage. No  extra-axial fluid collection is observed. There is no evidence for mass  effect or shift of the midline structures. CT cerebral perfusion exam  demonstrates 0 mL where there is cerebral blood flow less than 30%.  There is delayed flow to the left cerebral hemisphere focally where  there is Tmax greater than 6 seconds to an area measuring 6 mL and this  is in the similar region as the cerebellar infarction demonstrated on  previous brain MRI.     Atherosclerotic calcifications are present involving the aortic arch.  Great vessel origins are patent. There are partially calcified plaques  involving the carotid bulbs and ICA origins similar to the previous CT  angiogram without evidence for NASCET stenosis. The distal cervical,  petrous, cavernous, supracavernous internal carotid arteries are patent.  Atherosclerotic calcifications are present involving the cavernous and  supracavernous segments of both internal carotid arteries with mild  narrowing. Both middle cerebral arteries and anterior cerebral arteries  are patent.     Right vertebral artery is larger than the left and there is moderate  narrowing at the origin of the right vertebral artery. Both cervical  vertebral arteries are patent. The intracranial, post PICA segment of  the left vertebral artery is  exceedingly small though this appears  similar to the prior exam. There is mild to moderate narrowing of the  intracranial segment right vertebral artery. There is mild to moderate  narrowing of the basilar artery. Both posterior cerebral arteries are  patent. There is mostly fetal origin of the right PCA.       Impression:      Left cerebellar hemisphere infarction was demonstrated to be  acute on brain MRI 08/16/2023. No evidence for hemorrhagic  conversion/intracranial hemorrhage. There is delayed flow to the  inferomedial left cerebral hemisphere in the region of the infarction  without area of diminished cerebral blood flow. CT angiogram head and  neck appears similar to the prior exam 08/19/2023. Atherosclerotic  calcifications are present involving the carotid bulbs and proximal  ICA's without NASCET stenosis. The left post PICA vertebral artery is  very small. 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 Moderate narrowing origin of the right vertebral  artery. Further evaluation could be performed with brain MRI if  indicated.     Findings of the noncontrast exam discussed with Dr. Rodriguez at 7:28 p.m.  Findings of the contrasted exam discussed with Dr. Rodriguez at 7:50 p.m. AI  analysis of LVO was utilized.  Radiation dose reduction techniques were  utilized, including automated exposure control and exposure modulation  based on body size.           This report was finalized on 10/23/2023 10:00 PM by Dr. Yohannes Sagastume M.D on Workstation: [id number]       XR Chest 1 View [[id number]] Collected: 10/23/23 1955     Updated: 10/23/23 2000    Narrative:      XR CHEST 1 VW-10/23/2023     HISTORY: Acute stroke protocol.     Heart size is at the upper limits of normal. Lungs appear free of acute  infiltrates.     Bones and soft tissues are unremarkable. There is mildly prominent soft  tissue in the medial aspect of the  right apex probably ectatic vascular  structures and similar to the 8/19/2023 study.       Impression:      1. No acute process.     This report was finalized on 10/23/2023 7:57 PM by Dr. Salvador Arita M.D on Workstation: [id number]               Results for orders placed during the hospital encounter of 08/19/23    Adult Transesophageal Echo ([facility]) W/ Cont if Necessary Per Protocol    Interpretation Summary    Left ventricular systolic function is hyperdynamic (EF > 70%).    Left ventricular wall thickness is consistent with mild to moderate concentric hypertrophy.    Left ventricular diastolic function is consistent with (grade I) impaired relaxation.    Normal right ventricular cavity size and systolic function noted.    The left atrial cavity is mildly dilated.    No evidence of a left atrial appendage thrombus was present    No evidence of a patent foramen ovale. Saline test results are negative.    There are very mild plaques in the distal descending thoracic aorta. There are no plaques in the aortic arch or ascending aorta    There is no evidence of pericardial effusion      ECG 12 Lead Stroke Evaluation   Preliminary Result   HEART RATE= 58  bpm   RR Interval= 1034  ms   IL Interval= 176  ms   P Horizontal Axis= -33  deg   P Front Axis= 62  deg   QRSD Interval= 101  ms   QT Interval= 481  ms   QTcB= 473  ms   QRS Axis= 54  deg   T Wave Axis= 51  deg   - NORMAL ECG -   Sinus rhythm   Electronically Signed By:    Date and Time of Study: 2023-10-23 19:35:15      SCANNED - TELEMETRY     Final Result           Assessment/Plan     Active Hospital Problems    Diagnosis  POA    **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 Coreas is a 58-year-old male  with history of hyperlipidemia, CVA, GERD who presents to the emergency room with facial numbness.    Occlusion of the left cerebral artery with infarction/CVA  -Patient being followed by neurology  -Neurochecks every 4 hours  -Telemetry unit for monitoring  -Continue Plavix daily  -MRI per neurology recommendation  -Continue other home medications    GERD/hyperlipidemia  -Continue home medications when med rec completed  -Chronic conditions stable at this time    I discussed the patient's findings and my recommendations with patient.    VTE Prophylaxis - SCDs.  Code Status - Full code.       JANEEN Kamara  [facility]  10/24/23  04:56 EDT      Electronically signed by Sebastian Gonsalez MD at 10/24/23 0920          Emergency Department Notes        Bakari, THEA Solano at 10/23/23 2021          Nursing report ED to floor  Flako Coreas  58 y.o.  male    HPI :   Chief Complaint   Patient presents with    Numbness       Admitting doctor:   Sebastian Gonsalez MD    Admitting diagnosis:   There were no encounter diagnoses.    Code status:   Current Code Status       Date Active Code Status Order ID Comments User Context       Prior            Allergies:   Patient has no known allergies.    Isolation:   No active isolations    Intake and Output  No intake or output data in the 24 hours ending 10/23/23 2021    Weight:       10/23/23  1906   Weight: 113 kg (249 lb 3.2 oz)       Most recent vitals:   Vitals:    10/23/23 1906 10/23/23 1908 10/23/23 1935 10/23/23 1952   BP:    121/74   BP Location:    Left arm   Patient Position:    Lying   Pulse:  62 60 56   Resp:    18   Temp:       TempSrc:       SpO2:  93% 94% 93%   Weight: 113 kg (249 lb 3.2 oz)      Height:           Active LDAs/IV Access:   Lines, Drains & Airways       Active LDAs       Name Placement date Placement time Site Days    Peripheral IV 10/23/23 1908 Anterior;Distal;Right Antecubital 10/23/23  1908  Antecubital  less than  1                    Labs (abnormal labs have a star):   Labs Reviewed   COMPREHENSIVE METABOLIC PANEL - Abnormal; Notable for the following components:       Result Value    Glucose 131 (*)     All other components within normal limits    Narrative:     GFR Normal >60  Chronic Kidney Disease <60  Kidney Failure <15     CBC WITH AUTO DIFFERENTIAL - Abnormal; Notable for the following components:    Eosinophil % 7.0 (*)     Eosinophils, Absolute 0.62 (*)     All other components within normal limits   POCT GLUCOSE FINGERSTICK - Abnormal; Notable for the following components:    Glucose 141 (*)     All other components within normal limits   PROTIME-INR - Normal   APTT - Normal   SINGLE HSTROPONIN T - Normal    Narrative:     High Sensitive Troponin T Reference Range:  <10.0 ng/L- Negative Female for AMI  <15.0 ng/L- Negative Male for AMI  >=10 - Abnormal Female indicating possible myocardial injury.  >=15 - Abnormal Male indicating possible myocardial injury.   Clinicians would have to utilize clinical acumen, EKG, Troponin, and serial changes to determine if it is an Acute Myocardial Infarction or myocardial injury due to an underlying chronic condition.        RAINBOW DRAW    Narrative:     The following orders were created for panel order [place] Draw.  Procedure                               Abnormality         Status                     ---------                               -----------         ------                     Green Top (Gel)[[id number]]                                  Final result               Lavender Top[[id number]]                                     Final result               Gold Top - SST[[id number]]                                   Final result               Light Blue Top[[id number]]                                   Final result                 Please view results for these tests on the individual orders.   POCT GLUCOSE FINGERSTICK   POCT GLUCOSE FINGERSTICK   POCT GLUCOSE FINGERSTICK   POCT  GLUCOSE FINGERSTICK   POCT GLUCOSE FINGERSTICK   TYPE AND SCREEN   CBC AND DIFFERENTIAL    Narrative:     The following orders were created for panel order CBC & Differential.  Procedure                               Abnormality         Status                     ---------                               -----------         ------                     CBC Auto Differential[[id number]]        Abnormal            Final result                 Please view results for these tests on the individual orders.   GREEN TOP   LAVENDER TOP   GOLD TOP - SST   LIGHT BLUE TOP       EKG:   ECG 12 Lead Stroke Evaluation   Preliminary Result   HEART RATE= 58  bpm   RR Interval= 1034  ms   CT Interval= 176  ms   P Horizontal Axis= -33  deg   P Front Axis= 62  deg   QRSD Interval= 101  ms   QT Interval= 481  ms   QTcB= 473  ms   QRS Axis= 54  deg   T Wave Axis= 51  deg   - NORMAL ECG -   Sinus rhythm   Electronically Signed By:    Date and Time of Study: 2023-10-23 19:35:15          Meds given in ED:   Medications   sodium chloride 0.9 % flush 10 mL (has no administration in time range)   sodium chloride 0.9 % flush 10 mL (has no administration in time range)   sodium chloride 0.9 % flush 10 mL (has no administration in time range)   sodium chloride 0.9 % infusion 40 mL (has no administration in time range)   atorvastatin (LIPITOR) tablet 80 mg (has no administration in time range)   aspirin tablet 325 mg (has no administration in time range)     Or   aspirin suppository 300 mg (has no administration in time range)   clopidogrel (PLAVIX) tablet 75 mg (has no administration in time range)   iopamidol (ISOVUE-370) 76 % injection 150 mL (150 mL Intravenous Given by Other 10/23/23 1925)   clopidogrel (PLAVIX) tablet 300 mg (300 mg Oral Given 10/23/23 2005)       Imaging results:  XR Chest 1 View    Result Date: 10/23/2023  1. No acute process.  This report was finalized on 10/23/2023 7:57 PM by Dr. Salvador Arita M.D on Workstation:  [id number]       Ambulatory status:   - Ax1    Social issues:   Social History     Socioeconomic History    Marital status: Single   Tobacco Use    Smoking status: Never    Smokeless tobacco: Never   Vaping Use    Vaping Use: Never used   Substance and Sexual Activity    Alcohol use: Yes     Alcohol/week: 20.0 standard drinks of alcohol     Types: 10 Cans of beer, 10 Shots of liquor per week     Comment: pt states very rarely    Drug use: Yes     Frequency: 1.0 times per week     Types: Marijuana     Comment: Pt states he may have smoked marijuana 3 weeks ago (stated on 11-07-18)    Sexual activity: Not Currently     Partners: Female       NIH Stroke Scale:  Interval: baseline    Xiomara Villegas RN  10/23/23 20:21 EDT     Call Xiomara #[phone] with any questions     Electronically signed by Xiomara Villegas RN at 10/23/23 2022       Bjorn Sin MD at 10/23/23 1909           EMERGENCY DEPARTMENT ENCOUNTER    Room Number:  20/20  PCP: Akash Rodriguez Jr., DO  Historian: Patient      HPI:  Chief Complaint: Left-sided numbness  A complete HPI/ROS/PMH/PSH/SH/FH are unobtainable due to: None  Context: Flako Coreas is a 58 y.o. male who presents to the ED c/o left-sided numbness.  Patient states he has had history of recent stroke.  Patient is on aspirin and was on Plavix but is not anymore.  Patient states symptoms started about 430 tonight.  Patient states he noted some numbness to the left side of his face.  Then noted some numbness left hand.  Has had no numbness to his left leg.  Has had no weakness.  No speech changes.  Patient has baseline double vision but this is not any worse than it has been.  No headache no fevers or chills.  Patient has had no chest pain or shortness of breath            PAST MEDICAL HISTORY  Active Ambulatory Problems     Diagnosis Date Noted    Mixed anxiety depressive disorder 12/11/2012    Mixed hyperlipidemia 06/26/2017    Diverticulosis 07/27/2018    Nodule of spleen 06/27/2018     Chronic bilateral lower abdominal pain 08/10/2018    Lepe's esophagus without dysplasia 10/10/2018    GERD (gastroesophageal reflux disease) 09/18/2018    History of esophagitis 12/08/2022    Dizziness 08/16/2023    Hypertensive emergency 08/19/2023    Ataxia 08/19/2023    CVA (cerebral vascular accident) 08/22/2023     Resolved Ambulatory Problems     Diagnosis Date Noted    Essential hypertension 06/26/2017    Abdominal pain 06/20/2018    Generalized abdominal pain 12/08/2022    Gastrointestinal hemorrhage 12/08/2022    Acute CVA (cerebrovascular accident) 08/17/2023     Past Medical History:   Diagnosis Date    ADHD (attention deficit hyperactivity disorder)     Anxiety     Lepe's esophagus     Benign prostatic hyperplasia Surgery in 12/2021    Clotting disorder Intestinal    Depression     Diverticulitis     Duodenitis     Enteritis     Failure to thrive (child)     Family history of blood clots     Hyperlipidemia     Hypertension     Low testosterone     Microcytosis     Pancreatitis     Pneumonia     PONV (postoperative nausea and vomiting)     Seasonal affective disorder     Shoulder injury     Stroke     Unexplained weight loss          PAST SURGICAL HISTORY  Past Surgical History:   Procedure Laterality Date    COLON SURGERY      COLONOSCOPY      COLONOSCOPY N/A 12/11/2022    Procedure: COLONOSCOPY INTO CECUM WITH HOT SNARE POLYPECTOMY;  Surgeon: Albert Gallo MD;  Location: [facility] ENDOSCOPY;  Service: Gastroenterology;  Laterality: N/A;  PRE- GI BLEED  POST- DIVERTICULOSIS, POLYP    ENDOSCOPY N/A 12/10/2022    Procedure: ESOPHAGOGASTRODUODENOSCOPY;  Surgeon: Albert Gallo MD;  Location: [facility] ENDOSCOPY;  Service: Gastroenterology;  Laterality: N/A;  PRE- DARK STOOLS  POST- BARRETTS ESOPHAGUS    FRACTURE SURGERY  1980    for broken arm    FRACTURE SURGERY      for broken hand    INCISION AND DRAINAGE ABSCESS  2015    anal    PROSTATE SURGERY      SMALL INTESTINE SURGERY      TONSILLECTOMY            FAMILY HISTORY  Family History   Problem Relation Age of Onset    Stroke Mother     Stroke Father          SOCIAL HISTORY  Social History     Socioeconomic History    Marital status: Single   Tobacco Use    Smoking status: Never    Smokeless tobacco: Never   Vaping Use    Vaping Use: Never used   Substance and Sexual Activity    Alcohol use: Yes     Alcohol/week: 20.0 standard drinks of alcohol     Types: 10 Cans of beer, 10 Shots of liquor per week     Comment: pt states very rarely    Drug use: Yes     Frequency: 1.0 times per week     Types: Marijuana     Comment: Pt states he may have smoked marijuana 3 weeks ago (stated on 11-07-18)    Sexual activity: Not Currently     Partners: Female         ALLERGIES  Patient has no known allergies.        REVIEW OF SYSTEMS  Review of Systems   Left-sided numbness      PHYSICAL EXAM  ED Triage Vitals   Temp Heart Rate Resp BP SpO2   10/23/23 1852 10/23/23 1852 10/23/23 1852 10/23/23 1902 10/23/23 1852   96.8 °F (36 °C) 74 18 140/82 93 %      Temp src Heart Rate Source Patient Position BP Location FiO2 (%)   10/23/23 1852 10/23/23 1852 10/23/23 1902 10/23/23 1902 --   Tympanic Monitor Lying Right arm        Physical Exam      GENERAL: no acute distress  HENT: nares patent  EYES: no scleral icterus  CV: regular rhythm, normal rate  RESPIRATORY: normal effort  ABDOMEN: soft  MUSCULOSKELETAL: no deformity  NEURO: alert, moves all extremities, follows commands  PSYCH:  calm, cooperative  SKIN: warm, dry    Vital signs and nursing notes reviewed.    NIH 1      LAB RESULTS  Recent Results (from the past 24 hour(s))   POC Glucose Once    Collection Time: 10/23/23  7:00 PM    Specimen: Blood   Result Value Ref Range    Glucose 141 (H) 70 - 130 mg/dL   Comprehensive Metabolic Panel    Collection Time: 10/23/23  7:09 PM    Specimen: Blood   Result Value Ref Range    Glucose 131 (H) 65 - 99 mg/dL    BUN 20 6 - 20 mg/dL    Creatinine 1.19 0.76 - 1.27 mg/dL    Sodium 143 136 -  145 mmol/L    Potassium 3.6 3.5 - 5.2 mmol/L    Chloride 105 98 - 107 mmol/L    CO2 29.0 22.0 - 29.0 mmol/L    Calcium 9.3 8.6 - 10.5 mg/dL    Total Protein 6.7 6.0 - 8.5 g/dL    Albumin 4.3 3.5 - 5.2 g/dL    ALT (SGPT) 29 1 - 41 U/L    AST (SGOT) 23 1 - 40 U/L    Alkaline Phosphatase 68 39 - 117 U/L    Total Bilirubin 0.3 0.0 - 1.2 mg/dL    Globulin 2.4 gm/dL    A/G Ratio 1.8 g/dL    BUN/Creatinine Ratio 16.8 7.0 - 25.0    Anion Gap 9.0 5.0 - 15.0 mmol/L    eGFR 71.7 >60.0 mL/min/1.73   Protime-INR    Collection Time: 10/23/23  7:09 PM    Specimen: Blood   Result Value Ref Range    Protime 12.6 11.7 - 14.2 Seconds    INR 0.93 0.90 - 1.10   aPTT    Collection Time: 10/23/23  7:09 PM    Specimen: Blood   Result Value Ref Range    PTT 28.9 22.7 - 35.4 seconds   Single High Sensitivity Troponin T    Collection Time: 10/23/23  7:09 PM    Specimen: Blood   Result Value Ref Range    HS Troponin T 8 <15 ng/L   Type & Screen    Collection Time: 10/23/23  7:09 PM    Specimen: Blood   Result Value Ref Range    ABO Type A     RH type Positive     Antibody Screen Negative     T&S Expiration Date 10/26/2023 11:59:59 PM    Green Top (Gel)    Collection Time: 10/23/23  7:09 PM   Result Value Ref Range    Extra Tube Hold for add-ons.    Lavender Top    Collection Time: 10/23/23  7:09 PM   Result Value Ref Range    Extra Tube hold for add-on    Gold Top - SST    Collection Time: 10/23/23  7:09 PM   Result Value Ref Range    Extra Tube Hold for add-ons.    Light Blue Top    Collection Time: 10/23/23  7:09 PM   Result Value Ref Range    Extra Tube Hold for add-ons.    CBC Auto Differential    Collection Time: 10/23/23  7:09 PM    Specimen: Blood   Result Value Ref Range    WBC 8.87 3.40 - 10.80 10*3/mm3    RBC 4.82 4.14 - 5.80 10*6/mm3    Hemoglobin 13.1 13.0 - 17.7 g/dL    Hematocrit 39.1 37.5 - 51.0 %    MCV 81.1 79.0 - 97.0 fL    MCH 27.2 26.6 - 33.0 pg    MCHC 33.5 31.5 - 35.7 g/dL    RDW 14.7 12.3 - 15.4 %    RDW-SD 42.7 37.0 -  54.0 fl    MPV 9.7 6.0 - 12.0 fL    Platelets 269 140 - 450 10*3/mm3    Neutrophil % 56.1 42.7 - 76.0 %    Lymphocyte % 28.7 19.6 - 45.3 %    Monocyte % 7.7 5.0 - 12.0 %    Eosinophil % 7.0 (H) 0.3 - 6.2 %    Basophil % 0.3 0.0 - 1.5 %    Immature Grans % 0.2 0.0 - 0.5 %    Neutrophils, Absolute 4.97 1.70 - 7.00 10*3/mm3    Lymphocytes, Absolute 2.55 0.70 - 3.10 10*3/mm3    Monocytes, Absolute 0.68 0.10 - 0.90 10*3/mm3    Eosinophils, Absolute 0.62 (H) 0.00 - 0.40 10*3/mm3    Basophils, Absolute 0.03 0.00 - 0.20 10*3/mm3    Immature Grans, Absolute 0.02 0.00 - 0.05 10*3/mm3    nRBC 0.0 0.0 - 0.2 /100 WBC   ECG 12 Lead Stroke Evaluation    Collection Time: 10/23/23  7:35 PM   Result Value Ref Range    QT Interval 481 ms    QTC Interval 473 ms       Ordered the above labs and reviewed the results.        RADIOLOGY  CT Angiogram Head w AI Analysis of LVO, CT Angiogram Neck, CT CEREBRAL PERFUSION WITH & WITHOUT CONTRAST    Result Date: 10/23/2023  CT ANGIOGRAM HEAD AND NECK WITH IV CONTRAST, CT CEREBRAL PERFUSION WITH AND WITHOUT CONTRAST.  HISTORY: Neuro deficit. Suspected acute stroke. Hand and face tingling.  TECHNIQUE: Head CT includes axial measuring from the base of skull to the vertex without IV contrast. CT angiogram head and neck includes axial imaging with IV contrast and data reconstructed in coronal and sagittal planes and three-dimensional volume rendering was performed. AI analysis of LVO was utilized. CT cerebral perfusion exam was obtained with and without IV contrast with use of rapid software.  COMPARISON: MRI brain 09/02/2023, CT head 08/21/2023, CT angiogram head and neck 08/19/2023, CT angiogram head and neck 08/16/2023.  FINDINGS: Noncontrasted head CT demonstrates small area of low density within the left cerebellar hemisphere that correlates with the inferomedial left cerebellar hemispheres infarction was demonstrated to be acute on brain MRI 08/16/2023. There are no abnormal areas of increased  attenuation intra-axial lead to suggest hemorrhage. No extra-axial fluid collection is observed. There is no evidence for mass effect or shift of the midline structures. CT cerebral perfusion exam demonstrates 0 mL where there is cerebral blood flow less than 30%. There is delayed flow to the left cerebral hemisphere focally where there is Tmax greater than 6 seconds to an area measuring 6 mL and this is in the similar region as the cerebellar infarction demonstrated on previous brain MRI.  Atherosclerotic calcifications are present involving the aortic arch. Great vessel origins are patent. There are partially calcified plaques involving the carotid bulbs and ICA origins similar to the previous CT angiogram without evidence for NASCET stenosis. The distal cervical, petrous, cavernous, supracavernous internal carotid arteries are patent. Atherosclerotic calcifications are present involving the cavernous and supracavernous segments of both internal carotid arteries with mild narrowing. Both middle cerebral arteries and anterior cerebral arteries are patent.  Right vertebral artery is larger than the left and there is moderate narrowing at the origin of the right vertebral artery. Both cervical vertebral arteries are patent. The intracranial, post PICA segment of the left vertebral artery is exceedingly small though this appears similar to the prior exam. There is mild to moderate narrowing of the intracranial segment right vertebral artery. There is mild to moderate narrowing of the basilar artery. Both posterior cerebral arteries are patent.      Left cerebellar hemisphere infarction was demonstrated to be acute on brain MRI 08/16/2023. No evidence for hemorrhagic conversion/intracranial hemorrhage. There is delayed flow to the inferomedial left cerebral hemisphere in the region of the infarction without area of diminished cerebral blood flow. CT angiogram head and neck appears similar to the prior exam 08/19/2023.  Atherosclerotic calcifications are present involving the carotid bulbs and proximal ICA's without NASCET stenosis. The left post PICA vertebral artery is very small. There is atherosclerotic disease involving the intracranial segment right vertebral artery and the basilar artery with mild to moderate narrowing. Moderate narrowing origin of the right vertebral artery. Further evaluation could be performed with brain MRI if indicated.  Findings of the noncontrast exam discussed with Dr. Rodriguez at 7:28 p.m. Findings of the contrasted exam discussed with Dr. Rodriguez at 7:50 p.m. AI analysis of LVO was utilized.  Radiation dose reduction techniques were utilized, including automated exposure control and exposure modulation based on body size.       XR Chest 1 View    Result Date: 10/23/2023  XR CHEST 1 VW-10/23/2023  HISTORY: Acute stroke protocol.  Heart size is at the upper limits of normal. Lungs appear free of acute infiltrates.  Bones and soft tissues are unremarkable. There is mildly prominent soft tissue in the medial aspect of the right apex probably ectatic vascular structures and similar to the 8/19/2023 study.      1. No acute process.  This report was finalized on 10/23/2023 7:57 PM by Dr. Salvador Arita M.D on Workstation: [id number]       Ordered the above noted radiological studies.  Chest x-ray independently interpreted by me and shows no evidence of pneumonia          PROCEDURES  Procedures      EKG          EKG time: 1935  Rhythm/Rate: Sinus bradycardia 58  P waves and NM: Normal P waves  QRS, axis: Normal QRS  ST and T waves: Normal ST-T wave    Interpreted Contemporaneously by me, independently viewed  Unchanged compared to prior 9/2/23      MEDICATIONS GIVEN IN ER  Medications   sodium chloride 0.9 % flush 10 mL (has no administration in time range)   sodium chloride 0.9 % flush 10 mL (10 mL Intravenous Given 10/23/23 2038)   sodium chloride 0.9 % flush 10 mL (has no administration in time range)   sodium  chloride 0.9 % infusion 40 mL (has no administration in time range)   atorvastatin (LIPITOR) tablet 80 mg (80 mg Oral Given 10/23/23 2037)   aspirin tablet 325 mg (325 mg Oral Given 10/23/23 2037)     Or   aspirin suppository 300 mg ( Rectal Not Given:  See Alt 10/23/23 2037)   clopidogrel (PLAVIX) tablet 75 mg (has no administration in time range)   iopamidol (ISOVUE-370) 76 % injection 150 mL (150 mL Intravenous Given by Other 10/23/23 1925)   clopidogrel (PLAVIX) tablet 300 mg (300 mg Oral Given 10/23/23 2005)   LORazepam (ATIVAN) injection 0.25 mg (0.25 mg Intravenous Given 10/23/23 2101)                   MEDICAL DECISION MAKING, PROGRESS, and CONSULTS     Discussion below represents my analysis of pertinent findings related to patient's condition, differential diagnosis, treatment plan and final disposition.      Additional sources:  - Discussed/ obtained information from independent historians: None    - External (non-ED) record review: Epic reviewed and patient seen at outside ER 9/6/2023 for vertigo after CVA    - Chronic or social conditions impacting care: None    - Shared decision making: None      Orders placed during this visit:  Orders Placed This Encounter   Procedures    CT Angiogram Head w AI Analysis of LVO    CT Angiogram Neck    CT CEREBRAL PERFUSION WITH & WITHOUT CONTRAST    XR Chest 1 View    MRI Brain Without Contrast    [facility] Draw    Comprehensive Metabolic Panel    Protime-INR    aPTT    Single High Sensitivity Troponin T    CBC Auto Differential    Hemoglobin A1c    Lipid Panel    NPO Diet NPO Type: Strict NPO    Initiate Department's Acute Stroke Process (Team D, Code 19, etc.)    Perform NIH Stroke Scale    Measure Actual Weight    Notify MD for SBP < 80 or > 200    Notify Provider for SBP greater than 140 if hemorrhagic stroke    Head of Bed 30 Degrees or Less    Undress and Gown    Vital Signs    Neuro Checks    No Hypotonic Fluids    Nursing Dysphagia Screening (Complete Prior to  Giving anything PO)    RN to Place Order SLP Consult (IF swallow screen failed) - Eval & Treat Choosing Reason of RN Dysphagia Screen Failed    Vital Signs    Pulse Oximetry, Continuous    Telemetry - Place Orders & Notify Provider of Results When Patient Experiences Acute Chest Pain, Dysrhythmia or Respiratory Distress    Notify Provider    Nursing Dysphagia Screening (Complete Prior to Giving Anything By Mouth)    RN to Place Order SLP Consult - Eval & Treat Choosing Reason of RN Dysphagia Screen Failed    Nurse to Call MD or Nutrition Services for Diet if Patient Passes Dysphagia Screen    Intake and Output    Neuro Checks    NIHSS Assessment    Order CT Head Without Contrast for Neurological Decline    Provide Stroke Education Material    Saline Lock & Maintain IV Access    Place Sequential Compression Device    Maintain Sequential Compression Device    Activity As Tolerated    Inpatient Neurology Consult Stroke    Inpatient Neurology Consult Stroke    [facility] (on-call MD unless specified) Details    Notify Stroke Coordinator    Inpatient Rehab Admission Consult    Inpatient Case Management  Consult    Inpatient Diabetes Educator Consult    OT Consult: Eval & Treat    PT Consult: Eval & Treat As Tolerated    Oxygen Therapy- Nasal Cannula; Titrate 1-6 LPM Per SpO2; 90 - 95%    SLP Consult: Eval & Treat Communication Disorder    POC Glucose Once    POC Glucose Once    POC Glucose Q6H    ECG 12 Lead Stroke Evaluation    Type & Screen    Insert Large-Bore Peripheral IV - RIGHT AC Preferred    Insert Peripheral IV    Inpatient Admission    Inpatient Admission    CBC & Differential    Green Top (Gel)    Lavender Top    Gold Top - SST    Light Blue Top         Additional orders considered but not ordered:  None        Differential diagnosis includes but is not limited to:    CVA versus TIA      Independent interpretation of labs, radiology studies, and discussions with consultants:  ED Course as of 10/23/23  "2104   Mon Oct 23, 2023   1912 19:12 EDT  Discussed with stroke neurologist on arrival and will have team D for imaging.  Patient's deficit is only a 1 so we would favor not doing tPA at this point unless the imaging shows otherwise [SL]   2012 20:12 EDT  Patient presents for left-sided numbness.  Patient's NIH score is too low for thrombolytics.  Has been seen by stroke neurology and he has also declined this.  Patient has been reloaded with Plavix.  CT scan shows no intervenable lesion.  Patient has been discussed with [facility] DON with [facility] who will admit. [SL]      ED Course User Index  [SL] Bjorn Sin MD                 DIAGNOSIS  Final diagnoses:   Acute CVA (cerebrovascular accident)         DISPOSITION  admit            Latest Documented Vital Signs:  As of 21:04 EDT  BP- 121/81 HR- 53 Temp- 96.8 °F (36 °C) (Tympanic) O2 sat- 92%              --    Please note that portions of this were completed with a voice recognition program.       Note Disclaimer: At [facility], we believe that sharing information builds trust and better relationships. You are receiving this note because you are receiving care at [facility] or recently visited. It is possible you will see health information before a provider has talked with you about it. This kind of information can be easy to misunderstand. To help you fully understand what it means for your health, we urge you to discuss this note with your provider.            Bjorn Sin MD  10/23/23 2104      Electronically signed by Bjorn Sin MD at 10/23/23 2104       Lesa Finn RN at 10/23/23 1908          Pt states he had some tingling to L face, LH. Has hx of prior cva, onset at 1630. Takes 325 mg ASA daily. Taken at 1000.     Does have intermittent motion sickness since last cva in August so does not drive. States is a little \"off balance at times\" since last cva but can walk independently.     Electronically signed by Lesa Finn RN " at 10/23/23 1936          Consult Notes (last 48 hours)        Bladimir Bledsoe MD at 10/23/23 2002        Consult Orders    1. Inpatient Neurology Consult Stroke [[id number]] ordered by Bjorn Sin MD at 10/23/23 1909    2. Inpatient Neurology Consult Stroke [[id number]] ordered by Bjorn Sin MD at 10/23/23 1909                 NEUROLOGY CONSULT - STROKE TEAM    DOS: 10/23/2023  NAME: Flako Coreas   : 1965  PCP: Akash Rodriguez Jr., DO  CC: Stroke  Referring MD: No ref. provider found  Patient being seen at request of Dr. Sin for advice and opinion on numbness.    Neurological Problem and Interval History:  58 y.o. male presents with chief complaint of: face numbness.    58m with a left cerebellar stroke and a critical left vert stenosis potentially ending in PICA in 2023 presents with a 1630 last known well sudden onset of left face numbness and tingling and left hand tingling. Patient feels like there is a sharp line down his face and also feels numbness inside his mouth and along his teeth. No motor symptoms. He has a baseline mild double vision from his stroke in August. He was on aspirin and plavix but recently came off the plavix. Blood pressure has been well controlled. Patient does not smoke cigarettes or vape.       Past Medical/Surgical Hx:  Past Medical History:   Diagnosis Date    ADHD (attention deficit hyperactivity disorder)     On going issue    Anxiety     Lepe's esophagus     Benign prostatic hyperplasia Surgery in 2021    Clotting disorder Intestinal    Depression     Diverticulitis     Diverticulosis     Duodenitis     Enteritis     Failure to thrive (child)     Family history of blood clots     GERD (gastroesophageal reflux disease)     Hyperlipidemia     Hypertension     Hypertensive emergency     Low testosterone     Microcytosis     Pancreatitis     Pneumonia     PONV (postoperative nausea and vomiting)     Seasonal affective disorder      Shoulder injury     Stroke     Unexplained weight loss      Past Surgical History:   Procedure Laterality Date    COLON SURGERY      COLONOSCOPY      COLONOSCOPY N/A 12/11/2022    Procedure: COLONOSCOPY INTO CECUM WITH HOT SNARE POLYPECTOMY;  Surgeon: Albert Gallo MD;  Location: [facility] ENDOSCOPY;  Service: Gastroenterology;  Laterality: N/A;  PRE- GI BLEED  POST- DIVERTICULOSIS, POLYP    ENDOSCOPY N/A 12/10/2022    Procedure: ESOPHAGOGASTRODUODENOSCOPY;  Surgeon: Albert Gallo MD;  Location: [facility] ENDOSCOPY;  Service: Gastroenterology;  Laterality: N/A;  PRE- DARK STOOLS  POST- BARRETTS ESOPHAGUS    FRACTURE SURGERY  1980    for broken arm    FRACTURE SURGERY      for broken hand    INCISION AND DRAINAGE ABSCESS  2015    anal    PROSTATE SURGERY      SMALL INTESTINE SURGERY      TONSILLECTOMY         Review of Systems:        No fever, sweats or chills,  No neck pain, back pain or joint pain  No loss of hearing or tinnitus  No blurry or double vision  No rashes or edema  No chest pain or palpitations  No shortness of breath or wheezing  No diarrhea or constipation  No urinary incontinence or dysuria  No seizures or syncope  No depression or anxiety    Medications On Admission  (Not in a hospital admission)      Allergies:  No Known Allergies    Social Hx:  Social History     Socioeconomic History    Marital status: Single   Tobacco Use    Smoking status: Never    Smokeless tobacco: Never   Vaping Use    Vaping Use: Never used   Substance and Sexual Activity    Alcohol use: Yes     Alcohol/week: 20.0 standard drinks of alcohol     Types: 10 Cans of beer, 10 Shots of liquor per week     Comment: pt states very rarely    Drug use: Yes     Frequency: 1.0 times per week     Types: Marijuana     Comment: Pt states he may have smoked marijuana 3 weeks ago (stated on 11-07-18)    Sexual activity: Not Currently     Partners: Female       Family Hx:  Family History   Problem Relation Age of Onset    Stroke Mother      "Stroke Father        Review of Imaging (Interpretation of images not reports):      Laboratory Results:   Lab Results   Component Value Date    GLUCOSE 131 (H) 10/23/2023    CALCIUM 9.3 10/23/2023     10/23/2023    K 3.6 10/23/2023    CO2 29.0 10/23/2023     10/23/2023    BUN 20 10/23/2023    CREATININE 1.19 10/23/2023    EGFRIFAFRI >60 09/28/2018    EGFRIFNONA 98 11/05/2018    BCR 16.8 10/23/2023    ANIONGAP 9.0 10/23/2023     Lab Results   Component Value Date    WBC 8.87 10/23/2023    HGB 13.1 10/23/2023    HCT 39.1 10/23/2023    MCV 81.1 10/23/2023     10/23/2023     Lab Results   Component Value Date    CHOL 117 08/20/2023    CHOL 194 08/16/2023     Lab Results   Component Value Date    HDL 34 (L) 08/20/2023    HDL 41 08/16/2023    HDL 46 05/11/2022     Lab Results   Component Value Date    LDL 62 08/20/2023     (H) 08/16/2023     (H) 05/11/2022     Lab Results   Component Value Date    TRIG 115 08/20/2023    TRIG 194 (H) 08/16/2023    TRIG 149 05/11/2022     Lab Results   Component Value Date    HGBA1C 5.20 08/16/2023     Lab Results   Component Value Date    INR 0.93 10/23/2023    INR 1.0 08/21/2023    INR 0.90 08/19/2023    PROTIME 12.6 10/23/2023    PROTIME 12.3 08/19/2023    PROTIME 12.7 12/11/2022         Physical Examination:   /74 (BP Location: Left arm, Patient Position: Lying)   Pulse 56   Temp 96.8 °F (36 °C) (Tympanic)   Resp 18   Ht 188 cm (74\")   Wt 113 kg (249 lb 3.2 oz)   SpO2 93%   BMI 32.00 kg/m²   General Appearance:   Well developed, well nourished, well groomed, alert, and cooperative.  HEENT: Normocephalic. Atraumatic. No JVD, no tracheal deviation.  Neck and Spine: Normal range of motion.  Normal alignment. No mass or tenderness.   Extremities:    No edema or deformities.   Skin:    No rashes or discoloration    Neurological examination:  Higher Integrative  Function: Oriented to time, place and person. Normal registration, recall, attention " span and concentration. Normal language including comprehension, spontaneous speech, repetition, naming and vocabulary. No neglect. Normal fund of knowledge and higher integrative function.  CN II: Pupils are equal, round, and reactive to light. Blinks to visual threat and counts fingers in all quiros. No Horners  CN III IV VI: Extraocular movements are full without nystagmus. Complains of midline diplopia which does not worsen towards either side  CN V: Marked left facial numbness although curiously patient reports symmetric temperature sensation. Numbness is in a sharp line down the center of his face and lightly involves V1. Also involves his inner mouth and teeth on the left side.   CN VII: Facial movements are symmetric. No labial dysarthria  CN VIII:   Auditory acuity is normal.  CN IX & X:   No palatal or pharnygeal dysarthria.  CN XI: Turns head without abnormality.   CN XII:   The tongue is midline.  No lingual dysarthria.   Motor: Normal muscle strength, bulk and tone in upper and lower extremities.  No fasciculations, rigidity, spasticity, or abnormal movements.  Reflexes: 2+ in the upper and lower extremities. Plantar responses are flexor.  Sensation: Normal to light touch, temperature, and proprioception in arms and legs.   Station and Gait: Deferred for bed rest    Coordination: Finger-to-nose test shows no dysmetria.  Rapid alternating movements are normal.  Heel-to-shin normal.    NIHSS:     Diagnoses / Discussion:  58 y.o. who presents with Sx of left brainstem stroke.  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 Patient's CT Perfusion shows a larger deficit than last August but unchanged CTA with a critical stenosis at the vertebral although it's possible that his left PICA occluded tonight. He has an NIHSS of 2 from double vision which is old and 1 for sensory which is new. I offered tPA/TNK but patient deferred given the risks of bleeding and death compared to the minimal  risk untreated. Decided to load with plavix and obtain MRI to assess. It's theoretically possible that patient had a distal right thalamic emboli but usually there is some midline overlap in sensation across the face above the level of the decussation.     Plan:  Plavix load. MRI     I have discussed the above with the patient   Time spent with patient: 60min    MDM  Reviewed: previous chart, nursing note and vitals  Reviewed previous: labs, MRI and CT scan  Interpretation: CT scan, MRI and labs  Total time providing critical care: 30-74 minutes. This excludes time spent performing separately reportable procedures and services.         Bladimir Bledsoe MD  Neurology             Electronically signed by Bladimir Bledsoe MD at 10/23/23 2010

## 2023-10-24 NOTE — ED NOTES
Nursing report ED to floor  Flako Coreas  58 y.o.  male    HPI :   Chief Complaint   Patient presents with    Numbness       Admitting doctor:   Sebastian Gonsalez MD    Admitting diagnosis:   There were no encounter diagnoses.    Code status:   Current Code Status       Date Active Code Status Order ID Comments User Context       Prior            Allergies:   Patient has no known allergies.    Isolation:   No active isolations    Intake and Output  No intake or output data in the 24 hours ending 10/23/23 2021    Weight:       10/23/23  1906   Weight: 113 kg (249 lb 3.2 oz)       Most recent vitals:   Vitals:    10/23/23 1906 10/23/23 1908 10/23/23 1935 10/23/23 1952   BP:    121/74   BP Location:    Left arm   Patient Position:    Lying   Pulse:  62 60 56   Resp:    18   Temp:       TempSrc:       SpO2:  93% 94% 93%   Weight: 113 kg (249 lb 3.2 oz)      Height:           Active LDAs/IV Access:   Lines, Drains & Airways       Active LDAs       Name Placement date Placement time Site Days    Peripheral IV 10/23/23 1908 Anterior;Distal;Right Antecubital 10/23/23  1908  Antecubital  less than 1                    Labs (abnormal labs have a star):   Labs Reviewed   COMPREHENSIVE METABOLIC PANEL - Abnormal; Notable for the following components:       Result Value    Glucose 131 (*)     All other components within normal limits    Narrative:     GFR Normal >60  Chronic Kidney Disease <60  Kidney Failure <15     CBC WITH AUTO DIFFERENTIAL - Abnormal; Notable for the following components:    Eosinophil % 7.0 (*)     Eosinophils, Absolute 0.62 (*)     All other components within normal limits   POCT GLUCOSE FINGERSTICK - Abnormal; Notable for the following components:    Glucose 141 (*)     All other components within normal limits   PROTIME-INR - Normal   APTT - Normal   SINGLE HSTROPONIN T - Normal    Narrative:     High Sensitive Troponin T Reference Range:  <10.0 ng/L- Negative Female for AMI  <15.0 ng/L- Negative  Male for AMI  >=10 - Abnormal Female indicating possible myocardial injury.  >=15 - Abnormal Male indicating possible myocardial injury.   Clinicians would have to utilize clinical acumen, EKG, Troponin, and serial changes to determine if it is an Acute Myocardial Infarction or myocardial injury due to an underlying chronic condition.        RAINBOW DRAW    Narrative:     The following orders were created for panel order [place] Draw.  Procedure                               Abnormality         Status                     ---------                               -----------         ------                     Green Top (Gel)[[id number]]                                  Final result               Lavender Top[[id number]]                                     Final result               Gold Top - SST[[id number]]                                   Final result               Light Blue Top[[id number]]                                   Final result                 Please view results for these tests on the individual orders.   POCT GLUCOSE FINGERSTICK   POCT GLUCOSE FINGERSTICK   POCT GLUCOSE FINGERSTICK   POCT GLUCOSE FINGERSTICK   POCT GLUCOSE FINGERSTICK   TYPE AND SCREEN   CBC AND DIFFERENTIAL    Narrative:     The following orders were created for panel order CBC & Differential.  Procedure                               Abnormality         Status                     ---------                               -----------         ------                     CBC Auto Differential[[id number]]        Abnormal            Final result                 Please view results for these tests on the individual orders.   GREEN TOP   LAVENDER TOP   GOLD TOP - SST   LIGHT BLUE TOP       EKG:   ECG 12 Lead Stroke Evaluation   Preliminary Result   HEART RATE= 58  bpm   RR Interval= 1034  ms   HI Interval= 176  ms   P Horizontal Axis= -33  deg   P Front Axis= 62  deg   QRSD Interval= 101  ms   QT Interval= 481  ms   QTcB= 473  ms   QRS Axis= 54  deg    T Wave Axis= 51  deg   - NORMAL ECG -   Sinus rhythm   Electronically Signed By:    Date and Time of Study: 2023-10-23 19:35:15          Meds given in ED:   Medications   sodium chloride 0.9 % flush 10 mL (has no administration in time range)   sodium chloride 0.9 % flush 10 mL (has no administration in time range)   sodium chloride 0.9 % flush 10 mL (has no administration in time range)   sodium chloride 0.9 % infusion 40 mL (has no administration in time range)   atorvastatin (LIPITOR) tablet 80 mg (has no administration in time range)   aspirin tablet 325 mg (has no administration in time range)     Or   aspirin suppository 300 mg (has no administration in time range)   clopidogrel (PLAVIX) tablet 75 mg (has no administration in time range)   iopamidol (ISOVUE-370) 76 % injection 150 mL (150 mL Intravenous Given by Other 10/23/23 1925)   clopidogrel (PLAVIX) tablet 300 mg (300 mg Oral Given 10/23/23 2005)       Imaging results:  XR Chest 1 View    Result Date: 10/23/2023  1. No acute process.  This report was finalized on 10/23/2023 7:57 PM by Dr. Salvador Arita M.D on Workstation: [id number]       Ambulatory status:   - Ax1    Social issues:   Social History     Socioeconomic History    Marital status: Single   Tobacco Use    Smoking status: Never    Smokeless tobacco: Never   Vaping Use    Vaping Use: Never used   Substance and Sexual Activity    Alcohol use: Yes     Alcohol/week: 20.0 standard drinks of alcohol     Types: 10 Cans of beer, 10 Shots of liquor per week     Comment: pt states very rarely    Drug use: Yes     Frequency: 1.0 times per week     Types: Marijuana     Comment: Pt states he may have smoked marijuana 3 weeks ago (stated on 11-07-18)    Sexual activity: Not Currently     Partners: Female       NIH Stroke Scale:  Interval: baseline    Xiomara Villegas RN  10/23/23 20:21 EDT     Call Xiomara #[phone] with any questions

## 2023-10-24 NOTE — TELEPHONE ENCOUNTER
Notified patient of results. Patient verbalized understanding.    Mary Jane Francis RN  Triage [facility]

## 2023-10-24 NOTE — TELEPHONE ENCOUNTER
Called and left VM, will continue to try to reach pt.    Mandy Gan, RN  Triage RN  10/24/23 12:56 EDT

## 2023-10-24 NOTE — PLAN OF CARE
Goal Outcome Evaluation:         Per RNYing, patient WFL for communication/cognition/swallow at this time.  ST to sign off, please reconsult, if needed.

## 2023-10-24 NOTE — H&P
Patient Name:  Flako Coreas  YOB: 1965  MRN:  [id number]  Admit Date:  10/23/2023  Patient Care Team:  Akash Rodriguez Jr.,  as PCP - General (Sports Medicine)      Subjective   History Present Illness     Chief Complaint   Patient presents with    Numbness     History of Present Illness  Mr. Coreas is a 58-year-old male with history of hyperlipidemia, CVA, GERD who presents to the emergency room with facial numbness.  Patient was recently admitted to the hospital from August 16 August 24 and at that time was found to be hypertensive and had an acute CVA and was placed on Plavix, however he states that he stopped taking the Plavix, his understanding was he was only supposed to take it for 21 days.  He has some residual double vision that has been the same but he had some new onset of left-sided facial numbness as well as some left hand numbness.  These symptoms started about 4:30 PM prior to arrival to the emergency room.  He denies having any recent illnesses or fevers, noted no trouble with speech or swallowing and knows no facial droop.  He denies any weakness or difficulty walking.  In the emergency room his troponin was 8, sodium 143, potassium 3.6, creatinine 1.1, blood glucose 131, INR 1.93, white blood cell count 8.8, hemoglobin 13.1, hematocrit 39.1, platelets 269.  Chest x-ray showed no acute process CTA of his head neck showed no evidence of hemorrhagic conversion, there is delayed flow to the inferior medial left cerebral hemisphere in the region of the infarction without area of diminished cerebral blood flow.  CT angio of the head and neck appear similar to prior exam on 8/19/2023.  The left post PICA vertebral artery is very small.  There is moderate narrowing origin of the right vertebral artery.  Patient was seen in the emergency room by Dr. Bledsoe with stroke neurology and was opted not to have tPA/TNK given the risk of bleeding versus symptoms.  He did get a loading  dose of Plavix and his plan for further work-up by neurology.    Review of Systems   Constitutional:  Negative for appetite change and fever.   HENT:  Negative for nosebleeds and trouble swallowing.    Eyes:  Positive for visual disturbance. Negative for photophobia and redness.   Respiratory:  Negative for cough, chest tightness, shortness of breath and wheezing.    Cardiovascular:  Negative for chest pain, palpitations and leg swelling.   Gastrointestinal:  Negative for abdominal distention, abdominal pain, nausea and vomiting.   Endocrine: Negative.    Genitourinary: Negative.    Musculoskeletal:  Negative for gait problem and joint swelling.   Skin: Negative.    Neurological:  Positive for numbness. Negative for dizziness, seizures, speech difficulty, light-headedness and headaches.   Hematological: Negative.    Psychiatric/Behavioral:  Negative for behavioral problems and confusion.         Personal History     Past Medical History:   Diagnosis Date    ADHD (attention deficit hyperactivity disorder)     On going issue    Anxiety     Lepe's esophagus     Benign prostatic hyperplasia Surgery in 12/2021    Clotting disorder Intestinal    Depression     Diverticulitis     Diverticulosis     Duodenitis     Enteritis     Failure to thrive (child)     Family history of blood clots     GERD (gastroesophageal reflux disease)     Hyperlipidemia     Hypertension     Hypertensive emergency     Low testosterone     Microcytosis     Pancreatitis     Pneumonia     PONV (postoperative nausea and vomiting)     Seasonal affective disorder     Shoulder injury     Stroke     Unexplained weight loss      Past Surgical History:   Procedure Laterality Date    COLON SURGERY      COLONOSCOPY      COLONOSCOPY N/A 12/11/2022    Procedure: COLONOSCOPY INTO CECUM WITH HOT SNARE POLYPECTOMY;  Surgeon: Albert Gallo MD;  Location: [facility] ENDOSCOPY;  Service: Gastroenterology;  Laterality: N/A;  PRE- GI BLEED  POST- DIVERTICULOSIS,  POLYP    ENDOSCOPY N/A 12/10/2022    Procedure: ESOPHAGOGASTRODUODENOSCOPY;  Surgeon: Albert Gallo MD;  Location: [facility] ENDOSCOPY;  Service: Gastroenterology;  Laterality: N/A;  PRE- DARK STOOLS  POST- BARRETTS ESOPHAGUS    FRACTURE SURGERY  1980    for broken arm    FRACTURE SURGERY      for broken hand    INCISION AND DRAINAGE ABSCESS  2015    anal    PROSTATE SURGERY      SMALL INTESTINE SURGERY      TONSILLECTOMY       Family History   Problem Relation Age of Onset    Stroke Mother     Stroke Father      Social History     Tobacco Use    Smoking status: Never    Smokeless tobacco: Never   Vaping Use    Vaping Use: Never used   Substance Use Topics    Alcohol use: Yes     Alcohol/week: 20.0 standard drinks of alcohol     Types: 10 Cans of beer, 10 Shots of liquor per week     Comment: pt states very rarely    Drug use: Yes     Frequency: 1.0 times per week     Types: Marijuana     Comment: Pt states he may have smoked marijuana 3 weeks ago (stated on 11-07-18)     No current facility-administered medications on file prior to encounter.     Current Outpatient Medications on File Prior to Encounter   Medication Sig Dispense Refill    amLODIPine (NORVASC) 10 MG tablet Take 0.5 tablets by mouth Daily.      aspirin 81 MG EC tablet Take 1 tablet by mouth Daily. 90 tablet 1    atorvastatin (LIPITOR) 40 MG tablet Take 1 tablet by mouth Every Night. (Patient taking differently: Take 2 tablets by mouth Every Night.) 90 tablet 1    carvedilol (COREG) 6.25 MG tablet Take 1 tablet by mouth 2 (Two) Times a Day With Meals. 180 tablet 1    hydrALAZINE (APRESOLINE) 100 MG tablet Take 1 tablet by mouth Every 8 (Eight) Hours. 270 tablet 1    hydroCHLOROthiazide (HYDRODIURIL) 12.5 MG tablet Take 1 tablet by mouth Daily. 30 tablet 1    hydrOXYzine (ATARAX) 25 MG tablet Take 1 tablet by mouth 3 (Three) Times a Day As Needed for Anxiety. 90 tablet 0    lisinopril (PRINIVIL,ZESTRIL) 40 MG tablet Take 1 tablet by mouth Daily. 90  tablet 1    meclizine (ANTIVERT) 25 MG tablet       spironolactone (ALDACTONE) 25 MG tablet Take 1 tablet by mouth Daily. 90 tablet 1    vilazodone (Viibryd) 40 MG tablet tablet Take 1 tablet by mouth Daily. 90 tablet 1     No Known Allergies    Objective    Objective     Vital Signs  Temp:  [96.8 °F (36 °C)-97.9 °F (36.6 °C)] 97.9 °F (36.6 °C)  Heart Rate:  [53-74] 55  Resp:  [18] 18  BP: (121-140)/(74-82) 121/77  SpO2:  [92 %-96 %] 96 %  on   ;   Device (Oxygen Therapy): room air  Body mass index is 31.53 kg/m².    Physical Exam  Vitals and nursing note reviewed.   Constitutional:       General: He is not in acute distress.     Appearance: He is well-developed.   HENT:      Head: Normocephalic.   Neck:      Vascular: No JVD.   Cardiovascular:      Rate and Rhythm: Normal rate and regular rhythm.      Comments: Normal sinus rhythm on the monitor heart rate 72 during my exam, no chest pain or shortness of breath  Pulmonary:      Effort: Pulmonary effort is normal.      Breath sounds: Normal breath sounds.      Comments: Lung sounds clear  Abdominal:      General: There is no distension.      Palpations: Abdomen is soft.      Tenderness: There is no abdominal tenderness.   Musculoskeletal:         General: Normal range of motion.      Cervical back: Normal range of motion.   Skin:     General: Skin is warm and dry.      Capillary Refill: Capillary refill takes less than 2 seconds.   Neurological:      Mental Status: He is alert and oriented to person, place, and time.      Comments: Patient still complains of some left facial numbness, no facial droop, denies any numbness in his hand.   Psychiatric:         Behavior: Behavior normal.         Results Review:  I reviewed the patient's new clinical results.  I reviewed the patient's new imaging results and agree with the interpretation.  I reviewed the patient's other test results and agree with the interpretation  I personally viewed and interpreted the patient's  EKG/Telemetry data  Discussed with ED provider.    Lab Results (last 24 hours)       Procedure Component Value Units Date/Time    POC Glucose Once [[id number]]  (Abnormal) Collected: 10/23/23 1900    Specimen: Blood Updated: 10/23/23 1902     Glucose 141 mg/dL     CBC & Differential [[id number]]  (Abnormal) Collected: 10/23/23 1909    Specimen: Blood Updated: 10/23/23 1923    Narrative:      The following orders were created for panel order CBC & Differential.  Procedure                               Abnormality         Status                     ---------                               -----------         ------                     CBC Auto Differential[[id number]]        Abnormal            Final result                 Please view results for these tests on the individual orders.    Comprehensive Metabolic Panel [[id number]]  (Abnormal) Collected: 10/23/23 1909    Specimen: Blood Updated: 10/23/23 1938     Glucose 131 mg/dL      BUN 20 mg/dL      Creatinine 1.19 mg/dL      Sodium 143 mmol/L      Potassium 3.6 mmol/L      Chloride 105 mmol/L      CO2 29.0 mmol/L      Calcium 9.3 mg/dL      Total Protein 6.7 g/dL      Albumin 4.3 g/dL      ALT (SGPT) 29 U/L      AST (SGOT) 23 U/L      Alkaline Phosphatase 68 U/L      Total Bilirubin 0.3 mg/dL      Globulin 2.4 gm/dL      A/G Ratio 1.8 g/dL      BUN/Creatinine Ratio 16.8     Anion Gap 9.0 mmol/L      eGFR 71.7 mL/min/1.73     Narrative:      GFR Normal >60  Chronic Kidney Disease <60  Kidney Failure <15      Protime-INR [[id number]]  (Normal) Collected: 10/23/23 1909    Specimen: Blood Updated: 10/23/23 1931     Protime 12.6 Seconds      INR 0.93    aPTT [[id number]]  (Normal) Collected: 10/23/23 1909    Specimen: Blood Updated: 10/23/23 1931     PTT 28.9 seconds     Single High Sensitivity Troponin T [[id number]]  (Normal) Collected: 10/23/23 1909    Specimen: Blood Updated: 10/23/23 1938     HS Troponin T 8 ng/L     Narrative:      High Sensitive Troponin T Reference  Range:  <10.0 ng/L- Negative Female for AMI  <15.0 ng/L- Negative Male for AMI  >=10 - Abnormal Female indicating possible myocardial injury.  >=15 - Abnormal Male indicating possible myocardial injury.   Clinicians would have to utilize clinical acumen, EKG, Troponin, and serial changes to determine if it is an Acute Myocardial Infarction or myocardial injury due to an underlying chronic condition.         CBC Auto Differential [[id number]]  (Abnormal) Collected: 10/23/23 1909    Specimen: Blood Updated: 10/23/23 1923     WBC 8.87 10*3/mm3      RBC 4.82 10*6/mm3      Hemoglobin 13.1 g/dL      Hematocrit 39.1 %      MCV 81.1 fL      MCH 27.2 pg      MCHC 33.5 g/dL      RDW 14.7 %      RDW-SD 42.7 fl      MPV 9.7 fL      Platelets 269 10*3/mm3      Neutrophil % 56.1 %      Lymphocyte % 28.7 %      Monocyte % 7.7 %      Eosinophil % 7.0 %      Basophil % 0.3 %      Immature Grans % 0.2 %      Neutrophils, Absolute 4.97 10*3/mm3      Lymphocytes, Absolute 2.55 10*3/mm3      Monocytes, Absolute 0.68 10*3/mm3      Eosinophils, Absolute 0.62 10*3/mm3      Basophils, Absolute 0.03 10*3/mm3      Immature Grans, Absolute 0.02 10*3/mm3      nRBC 0.0 /100 WBC     POC Glucose Once [[id number]]  (Abnormal) Collected: 10/23/23 2214    Specimen: Blood Updated: 10/23/23 2215     Glucose 151 mg/dL             Imaging Results (Last 24 Hours)       Procedure Component Value Units Date/Time    MRI Brain Without Contrast [[id number]] Collected: 10/23/23 2249     Updated: 10/23/23 2249    Narrative:        Patient: NIMESH ROCHA  Time Out: 22:48  Exam(s): MRI HEAD Without Contrast     EXAM:    MR Head Without Intravenous Contrast    CLINICAL HISTORY:     Reason for exam: Stroke, follow up.    TECHNIQUE:    Magnetic resonance images of the head brain without intravenous   contrast in multiple planes.    COMPARISON:    No relevant prior studies available.    FINDINGS:    Brain: No acute stroke.  Redemonstrated now subacute nonhemorrhagic    small left cerebellar infarct.  Small old right thalamic lacunar infarct.    No acute hemorrhage or abnormal extra-axial fluid collection.  Minimal   scattered supratentorial white matter hyperintensities on FLAIR and T2-  weighted images.    Ventricles:  No midline shift.  No ventriculomegaly.    Bones joints:  Unremarkable.    Sinuses:  Unremarkable as visualized.  No acute sinusitis.    Mastoid air cells:  Unremarkable as visualized.  No mastoid effusion.    Orbits:  Unremarkable as visualized.    IMPRESSION:       No acute stroke or bleed.  Subacute small left cerebellar infarct.  Small old right thalamic lacunar infarct.  Minimal non-specific white matter changes, most commonly seen with small   vessel disease.        Impression:          Electronically signed by Dawood Ace M.D. on 10-23-23 at 2248    CT Angiogram Head w AI Analysis of LVO [[id number]] Collected: 10/23/23 2026     Updated: 10/23/23 2203    Narrative:      CT ANGIOGRAM HEAD AND NECK WITH IV CONTRAST, CT CEREBRAL PERFUSION WITH  AND WITHOUT CONTRAST.     HISTORY: Neuro deficit. Suspected acute stroke. Hand and face tingling.     TECHNIQUE: Head CT includes axial measuring from the base of skull to  the vertex without IV contrast. CT angiogram head and neck includes  axial imaging with IV contrast and data reconstructed in coronal and  sagittal planes and three-dimensional volume rendering was performed. AI  analysis of LVO was utilized. CT cerebral perfusion exam was obtained  with and without IV contrast with use of rapid software.      COMPARISON: MRI brain 09/02/2023, CT head 08/21/2023, CT angiogram head  and neck 08/19/2023, CT angiogram head and neck 08/16/2023.     FINDINGS: Noncontrasted head CT demonstrates small area of low density  within the left cerebellar hemisphere that correlates with the  inferomedial left cerebellar hemispheres infarction was demonstrated to  be acute on brain MRI 08/16/2023. There are no abnormal areas  of  increased attenuation intra-axial lead to suggest hemorrhage. No  extra-axial fluid collection is observed. There is no evidence for mass  effect or shift of the midline structures. CT cerebral perfusion exam  demonstrates 0 mL where there is cerebral blood flow less than 30%.  There is delayed flow to the left cerebral hemisphere focally where  there is Tmax greater than 6 seconds to an area measuring 6 mL and this  is in the similar region as the cerebellar infarction demonstrated on  previous brain MRI.     Atherosclerotic calcifications are present involving the aortic arch.  Great vessel origins are patent. There are partially calcified plaques  involving the carotid bulbs and ICA origins similar to the previous CT  angiogram without evidence for NASCET stenosis. The distal cervical,  petrous, cavernous, supracavernous internal carotid arteries are patent.  Atherosclerotic calcifications are present involving the cavernous and  supracavernous segments of both internal carotid arteries with mild  narrowing. Both middle cerebral arteries and anterior cerebral arteries  are patent.     Right vertebral artery is larger than the left and there is moderate  narrowing at the origin of the right vertebral artery. Both cervical  vertebral arteries are patent. The intracranial, post PICA segment of  the left vertebral artery is exceedingly small though this appears  similar to the prior exam. There is mild to moderate narrowing of the  intracranial segment right vertebral artery. There is mild to moderate  narrowing of the basilar artery. Both posterior cerebral arteries are  patent. There is mostly fetal origin of the right PCA.       Impression:      Left cerebellar hemisphere infarction was demonstrated to be  acute on brain MRI 08/16/2023. No evidence for hemorrhagic  conversion/intracranial hemorrhage. There is delayed flow to the  inferomedial left cerebral hemisphere in the region of the infarction  without  area of diminished cerebral blood flow. CT angiogram head and  neck appears similar to the prior exam 08/19/2023. Atherosclerotic  calcifications are present involving the carotid bulbs and proximal  ICA's without NASCET stenosis. The left post PICA vertebral artery is  very small. 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 Moderate narrowing origin of the right vertebral  artery. Further evaluation could be performed with brain MRI if  indicated.     Findings of the noncontrast exam discussed with Dr. Rodriguez at 7:28 p.m.  Findings of the contrasted exam discussed with Dr. Rodriguez at 7:50 p.m. AI  analysis of LVO was utilized.  Radiation dose reduction techniques were  utilized, including automated exposure control and exposure modulation  based on body size.           This report was finalized on 10/23/2023 10:00 PM by Dr. Yohannes Sagastume M.D on Workstation: [id number]       CT Angiogram Neck [[id number]] Collected: 10/23/23 2026     Updated: 10/23/23 2203    Narrative:      CT ANGIOGRAM HEAD AND NECK WITH IV CONTRAST, CT CEREBRAL PERFUSION WITH  AND WITHOUT CONTRAST.     HISTORY: Neuro deficit. Suspected acute stroke. Hand and face tingling.     TECHNIQUE: Head CT includes axial measuring from the base of skull to  the vertex without IV contrast. CT angiogram head and neck includes  axial imaging with IV contrast and data reconstructed in coronal and  sagittal planes and three-dimensional volume rendering was performed. AI  analysis of LVO was utilized. CT cerebral perfusion exam was obtained  with and without IV contrast with use of rapid software.      COMPARISON: MRI brain 09/02/2023, CT head 08/21/2023, CT angiogram head  and neck 08/19/2023, CT angiogram head and neck 08/16/2023.     FINDINGS: Noncontrasted head CT demonstrates small area of low density  within the left cerebellar hemisphere that correlates with  the  inferomedial left cerebellar hemispheres infarction was demonstrated to  be acute on brain MRI 08/16/2023. There are no abnormal areas of  increased attenuation intra-axial lead to suggest hemorrhage. No  extra-axial fluid collection is observed. There is no evidence for mass  effect or shift of the midline structures. CT cerebral perfusion exam  demonstrates 0 mL where there is cerebral blood flow less than 30%.  There is delayed flow to the left cerebral hemisphere focally where  there is Tmax greater than 6 seconds to an area measuring 6 mL and this  is in the similar region as the cerebellar infarction demonstrated on  previous brain MRI.     Atherosclerotic calcifications are present involving the aortic arch.  Great vessel origins are patent. There are partially calcified plaques  involving the carotid bulbs and ICA origins similar to the previous CT  angiogram without evidence for NASCET stenosis. The distal cervical,  petrous, cavernous, supracavernous internal carotid arteries are patent.  Atherosclerotic calcifications are present involving the cavernous and  supracavernous segments of both internal carotid arteries with mild  narrowing. Both middle cerebral arteries and anterior cerebral arteries  are patent.     Right vertebral artery is larger than the left and there is moderate  narrowing at the origin of the right vertebral artery. Both cervical  vertebral arteries are patent. The intracranial, post PICA segment of  the left vertebral artery is exceedingly small though this appears  similar to the prior exam. There is mild to moderate narrowing of the  intracranial segment right vertebral artery. There is mild to moderate  narrowing of the basilar artery. Both posterior cerebral arteries are  patent. There is mostly fetal origin of the right PCA.       Impression:      Left cerebellar hemisphere infarction was demonstrated to be  acute on brain MRI 08/16/2023. No evidence for  hemorrhagic  conversion/intracranial hemorrhage. There is delayed flow to the  inferomedial left cerebral hemisphere in the region of the infarction  without area of diminished cerebral blood flow. CT angiogram head and  neck appears similar to the prior exam 08/19/2023. Atherosclerotic  calcifications are present involving the carotid bulbs and proximal  ICA's without NASCET stenosis. The left post PICA vertebral artery is  very small. 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 Moderate narrowing origin of the right vertebral  artery. Further evaluation could be performed with brain MRI if  indicated.     Findings of the noncontrast exam discussed with Dr. Rodriguez at 7:28 p.m.  Findings of the contrasted exam discussed with Dr. Rodriguez at 7:50 p.m. AI  analysis of LVO was utilized.  Radiation dose reduction techniques were  utilized, including automated exposure control and exposure modulation  based on body size.           This report was finalized on 10/23/2023 10:00 PM by Dr. Yohannes Sagastume M.D on Workstation: [id number]       CT CEREBRAL PERFUSION WITH & WITHOUT CONTRAST [[id number]] Collected: 10/23/23 2026     Updated: 10/23/23 2203    Narrative:      CT ANGIOGRAM HEAD AND NECK WITH IV CONTRAST, CT CEREBRAL PERFUSION WITH  AND WITHOUT CONTRAST.     HISTORY: Neuro deficit. Suspected acute stroke. Hand and face tingling.     TECHNIQUE: Head CT includes axial measuring from the base of skull to  the vertex without IV contrast. CT angiogram head and neck includes  axial imaging with IV contrast and data reconstructed in coronal and  sagittal planes and three-dimensional volume rendering was performed. AI  analysis of LVO was utilized. CT cerebral perfusion exam was obtained  with and without IV contrast with use of rapid software.      COMPARISON: MRI brain 09/02/2023, CT head 08/21/2023, CT angiogram head  and neck  08/19/2023, CT angiogram head and neck 08/16/2023.     FINDINGS: Noncontrasted head CT demonstrates small area of low density  within the left cerebellar hemisphere that correlates with the  inferomedial left cerebellar hemispheres infarction was demonstrated to  be acute on brain MRI 08/16/2023. There are no abnormal areas of  increased attenuation intra-axial lead to suggest hemorrhage. No  extra-axial fluid collection is observed. There is no evidence for mass  effect or shift of the midline structures. CT cerebral perfusion exam  demonstrates 0 mL where there is cerebral blood flow less than 30%.  There is delayed flow to the left cerebral hemisphere focally where  there is Tmax greater than 6 seconds to an area measuring 6 mL and this  is in the similar region as the cerebellar infarction demonstrated on  previous brain MRI.     Atherosclerotic calcifications are present involving the aortic arch.  Great vessel origins are patent. There are partially calcified plaques  involving the carotid bulbs and ICA origins similar to the previous CT  angiogram without evidence for NASCET stenosis. The distal cervical,  petrous, cavernous, supracavernous internal carotid arteries are patent.  Atherosclerotic calcifications are present involving the cavernous and  supracavernous segments of both internal carotid arteries with mild  narrowing. Both middle cerebral arteries and anterior cerebral arteries  are patent.     Right vertebral artery is larger than the left and there is moderate  narrowing at the origin of the right vertebral artery. Both cervical  vertebral arteries are patent. The intracranial, post PICA segment of  the left vertebral artery is exceedingly small though this appears  similar to the prior exam. There is mild to moderate narrowing of the  intracranial segment right vertebral artery. There is mild to moderate  narrowing of the basilar artery. Both posterior cerebral arteries are  patent. There is  mostly fetal origin of the right PCA.       Impression:      Left cerebellar hemisphere infarction was demonstrated to be  acute on brain MRI 08/16/2023. No evidence for hemorrhagic  conversion/intracranial hemorrhage. There is delayed flow to the  inferomedial left cerebral hemisphere in the region of the infarction  without area of diminished cerebral blood flow. CT angiogram head and  neck appears similar to the prior exam 08/19/2023. Atherosclerotic  calcifications are present involving the carotid bulbs and proximal  ICA's without NASCET stenosis. The left post PICA vertebral artery is  very small. 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 Moderate narrowing origin of the right vertebral  artery. Further evaluation could be performed with brain MRI if  indicated.     Findings of the noncontrast exam discussed with Dr. Rodriguez at 7:28 p.m.  Findings of the contrasted exam discussed with Dr. Rodriguez at 7:50 p.m. AI  analysis of LVO was utilized.  Radiation dose reduction techniques were  utilized, including automated exposure control and exposure modulation  based on body size.           This report was finalized on 10/23/2023 10:00 PM by Dr. Yohannes Sagastume M.D on Workstation: [id number]       XR Chest 1 View [[id number]] Collected: 10/23/23 1955     Updated: 10/23/23 2000    Narrative:      XR CHEST 1 VW-10/23/2023     HISTORY: Acute stroke protocol.     Heart size is at the upper limits of normal. Lungs appear free of acute  infiltrates.     Bones and soft tissues are unremarkable. There is mildly prominent soft  tissue in the medial aspect of the right apex probably ectatic vascular  structures and similar to the 8/19/2023 study.       Impression:      1. No acute process.     This report was finalized on 10/23/2023 7:57 PM by Dr. Salvador Arita M.D on Workstation: [id number]               Results for orders  placed during the hospital encounter of 08/19/23    Adult Transesophageal Echo ([facility]ANN) W/ Cont if Necessary Per Protocol    Interpretation Summary    Left ventricular systolic function is hyperdynamic (EF > 70%).    Left ventricular wall thickness is consistent with mild to moderate concentric hypertrophy.    Left ventricular diastolic function is consistent with (grade I) impaired relaxation.    Normal right ventricular cavity size and systolic function noted.    The left atrial cavity is mildly dilated.    No evidence of a left atrial appendage thrombus was present    No evidence of a patent foramen ovale. Saline test results are negative.    There are very mild plaques in the distal descending thoracic aorta. There are no plaques in the aortic arch or ascending aorta    There is no evidence of pericardial effusion      ECG 12 Lead Stroke Evaluation   Preliminary Result   HEART RATE= 58  bpm   RR Interval= 1034  ms   NV Interval= 176  ms   P Horizontal Axis= -33  deg   P Front Axis= 62  deg   QRSD Interval= 101  ms   QT Interval= 481  ms   QTcB= 473  ms   QRS Axis= 54  deg   T Wave Axis= 51  deg   - NORMAL ECG -   Sinus rhythm   Electronically Signed By:    Date and Time of Study: 2023-10-23 19:35:15      SCANNED - TELEMETRY     Final Result           Assessment/Plan     Active Hospital Problems    Diagnosis  POA    **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 Coreas is a 58-year-old male with history of hyperlipidemia, CVA, GERD who presents to the emergency room with facial numbness.    Occlusion of the left cerebral artery with infarction/CVA  -Patient being followed by neurology  -Neurochecks every 4 hours  -Telemetry unit for monitoring  -Continue  Plavix daily  -MRI per neurology recommendation  -Continue other home medications    GERD/hyperlipidemia  -Continue home medications when med rec completed  -Chronic conditions stable at this time    I discussed the patient's findings and my recommendations with patient.    VTE Prophylaxis - SCDs.  Code Status - Full code.       JANEEN Kamara  [facility]  10/24/23  04:56 EDT

## 2023-10-24 NOTE — PROGRESS NOTES
[facility] Acute Inpt Rehab Note     Referral received via stroke order set.  Please note this is a screening only, rehab admissions will not actively be evaluating this patient.  If felt patient is appropriate for our services once therapies begin, please call our office at [phone], to initiate a full referral.    Thank you,   Ijeoma Thakkar RN   Rehab Admission Nurse

## 2023-10-24 NOTE — PLAN OF CARE
Goal Outcome Evaluation:  Plan of Care Reviewed With: (P) patient           Outcome Evaluation: (P) Pt is a 59 yo. male admited from home w/ L sided numbness. He has hx of L. cerebellar stroke, L-sided weakness and dizziness at baseline. Pt lives alone where he is ind for all household mobility and no AD. He reports recent hx of falls - 1x at grocery store and multiple falls at home at night under low lighting. Pt presents to physical therapy w/ generalized weakness and coordination impairments that are present at BL and limiting to functional mobliity. Pt performed bed mobility and STS w/ SV and no AD and ambulated 400' w/ CGA and no AD. Pt presents ataxic gait w/ compensations for L-sided weakness that pt reports at BL. Pt appears unsteady but had no LOB. Pt returned to bed w/ needs met. Pt agrees he does not require skilled acute PT and PT w/ s/o as pt mobilizes at recent BL and anticipated DC home w/ OPPT - pt has apts scheduled in advance.      Anticipated Discharge Disposition (PT): (P) home with outpatient therapy services

## 2023-10-25 ENCOUNTER — READMISSION MANAGEMENT (OUTPATIENT)
Dept: CALL CENTER | Facility: HOSPITAL | Age: 58
End: 2023-10-25
Payer: COMMERCIAL

## 2023-10-25 ENCOUNTER — TELEPHONE (OUTPATIENT)
Dept: SPORTS MEDICINE | Facility: CLINIC | Age: 58
End: 2023-10-25
Payer: MEDICAID

## 2023-10-25 VITALS
RESPIRATION RATE: 20 BRPM | BODY MASS INDEX: 31.52 KG/M2 | OXYGEN SATURATION: 99 % | TEMPERATURE: 99.1 F | WEIGHT: 245.59 LBS | HEART RATE: 62 BPM | DIASTOLIC BLOOD PRESSURE: 96 MMHG | HEIGHT: 74 IN | SYSTOLIC BLOOD PRESSURE: 148 MMHG

## 2023-10-25 PROBLEM — I69.30 LATE EFFECT OF CEREBROVASCULAR ACCIDENT (CVA): Status: ACTIVE | Noted: 2023-10-25

## 2023-10-25 RX ORDER — ATORVASTATIN CALCIUM 80 MG/1
80 TABLET, FILM COATED ORAL NIGHTLY
Qty: 30 TABLET | Refills: 0 | Status: ON HOLD | OUTPATIENT
Start: 2023-10-25

## 2023-10-25 RX ADMIN — LISINOPRIL 40 MG: 40 TABLET ORAL at 08:48

## 2023-10-25 RX ADMIN — Medication 10 ML: at 08:49

## 2023-10-25 RX ADMIN — AMLODIPINE BESYLATE 5 MG: 5 TABLET ORAL at 08:48

## 2023-10-25 RX ADMIN — ASPIRIN 81 MG: 81 TABLET, CHEWABLE ORAL at 08:30

## 2023-10-25 RX ADMIN — TICAGRELOR 60 MG: 60 TABLET ORAL at 08:48

## 2023-10-25 RX ADMIN — HYDROXYZINE HYDROCHLORIDE 25 MG: 25 TABLET ORAL at 08:48

## 2023-10-25 RX ADMIN — VILAZODONE HYDROCHLORIDE 40 MG: 40 TABLET, FILM COATED ORAL at 08:48

## 2023-10-25 RX ADMIN — HYDRALAZINE HYDROCHLORIDE 100 MG: 50 TABLET, FILM COATED ORAL at 06:16

## 2023-10-25 RX ADMIN — CARVEDILOL 6.25 MG: 6.25 TABLET, FILM COATED ORAL at 08:48

## 2023-10-25 RX ADMIN — SPIRONOLACTONE 25 MG: 25 TABLET, FILM COATED ORAL at 08:48

## 2023-10-25 RX ADMIN — HYDROCHLOROTHIAZIDE 12.5 MG: 12.5 TABLET ORAL at 08:51

## 2023-10-25 RX ADMIN — FAMOTIDINE 20 MG: 20 TABLET, FILM COATED ORAL at 08:30

## 2023-10-25 NOTE — PLAN OF CARE
Problem: Adjustment to Illness (Stroke, Ischemic/Transient Ischemic Attack)  Goal: Optimal Coping  Outcome: Ongoing, Progressing     Problem: Cerebral Tissue Perfusion (Stroke, Ischemic/Transient Ischemic Attack)  Goal: Optimal Cerebral Tissue Perfusion  Outcome: Ongoing, Progressing  Intervention: Protect and Optimize Cerebral Perfusion  Recent Flowsheet Documentation  Taken 10/24/2023 2027 by Jessie Morgan RN  Cerebral Perfusion Promotion: blood pressure monitored   Goal Outcome Evaluation:

## 2023-10-25 NOTE — TELEPHONE ENCOUNTER
"Patient called in this morning very upset and wanting to speak directly to you.     He stated he had a stroke on 08/16/2023 and had another one this week. He is not scheduled to see a neurologist until 11/21/2023 and stated that he feels \"we just want him to die.\"   He is very upset with how long it is taking to see a neurologist, does not know what to do or where to go from here or if he is doing everything he needs to be doing to prevent another stroke. Wants to discuss instructions for him and a plan of care for his strokes.     Thanks  Priti     "

## 2023-10-25 NOTE — PLAN OF CARE
Goal Outcome Evaluation:  Plan of Care Reviewed With: patient        Progress: improving          Patient is discharged

## 2023-10-25 NOTE — PROGRESS NOTES
Case Management Discharge Note      Final Note: Home         Selected Continued Care - Discharged on 10/25/2023 Admission date: 10/23/2023 - Discharge disposition: Home or Self Care      Destination    No services have been selected for the patient.                Durable Medical Equipment    No services have been selected for the patient.                Dialysis/Infusion    No services have been selected for the patient.                Home Medical Care    No services have been selected for the patient.                Therapy    No services have been selected for the patient.                Community Resources    No services have been selected for the patient.                Community & DME    No services have been selected for the patient.                    Selected Continued Care - Prior Encounters Includes continued care and service providers with selected services from prior encounters from 7/25/2023 to 10/25/2023      Discharged on 8/24/2023 Admission date: 8/19/2023 - Discharge disposition: Skilled Nursing Facility (DC - External)      Destination       Service Provider Selected Services Address Phone Fax Patient Preferred    [facility] [address] [id number] [phone] --                          Transportation Services  Private: Car    Final Discharge Disposition Code: 01 - home or self-care

## 2023-10-25 NOTE — DISCHARGE SUMMARY
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        Hospital Course     Mr. Coreas is a 58 y.o. male with a history of hypertension, hyperlipidemia, anxiety and depression, recent left cerebellar strokes in August who [facility] complaining of left facial and hand numbness and tingling.  Please see the admitting history and physical for further details.  He was found to have  subacute left cerebellar infarct and a chronic right thalamic infarct and was admitted to the hospital for further evaluation and treatment.  Neurology consulted on admission for additional management recommendations. He was offered tPA/TNK but he deferred given the risk of bleeding and death compared to the minimal risk non-treatment with tPA.  He was Plavix loaded however his P2Y12 activity was checked and revealed he was a non-responder to Plavix so he has been switched to Brillinta at discharge. He has been instructed to continue Brillinta and aspiring DAPT for 90 days and then continue Brillinta mono-therapy after that. He should follow up with neurology next month as scheduled and his PCP in a week for post hospital follow up. He has outpatient physical therapy.   Day of Discharge     Subjective:  Resting in bed, expresses frustration that his neuro appointment is so far in the future.     Physical Exam:  Temp:  [97.7 °F  (36.5 °C)-99.1 °F (37.3 °C)] 99.1 °F (37.3 °C)  Heart Rate:  [54-62] 62  Resp:  [16-20] 20  BP: (126-148)/() 148/96  Body mass index is 31.53 kg/m².  Physical Exam  Constitutional:       General: He is not in acute distress.     Appearance: Normal appearance. He is not toxic-appearing.   Cardiovascular:      Rate and Rhythm: Normal rate and regular rhythm.   Pulmonary:      Effort: Pulmonary effort is normal. No respiratory distress.      Breath sounds: Normal breath sounds. No wheezing.   Abdominal:      Palpations: Abdomen is soft.      Tenderness: There is no abdominal tenderness.   Musculoskeletal:         General: No tenderness.      Right lower leg: No edema.      Left lower leg: No edema.   Skin:     General: Skin is warm and dry.   Neurological:      General: No focal deficit present.      Mental Status: He is alert and oriented to person, place, and time. Mental status is at baseline.      Motor: No weakness.         Consultants     Consult Orders (all) (From admission, onward)       Start     Ordered    10/23/23 2009  Notify Stroke Coordinator  Once        Provider:  (Not yet assigned)    10/23/23 2011    10/23/23 2009  Inpatient Rehab Admission Consult  Once        Provider:  (Not yet assigned)    10/23/23 2011    10/23/23 2009  Inpatient Case Management  Consult  Once        Provider:  (Not yet assigned)    10/23/23 2011    10/23/23 2009  Inpatient Diabetes Educator Consult  Once,   Status:  Canceled        Provider:  (Not yet assigned)    10/23/23 2011    10/23/23 2003  [facility] (on-call MD unless specified) Details  Once        Specialty:  Hospitalist  Provider:  (Not yet assigned)    10/23/23 2002    10/23/23 1910  Inpatient Neurology Consult Stroke  Once        Specialty:  Neurology  Provider:  (Not yet assigned)    10/23/23 1909    10/23/23 1910  Inpatient Neurology Consult Stroke  Once        Specialty:  Neurology  Provider:  (Not yet assigned)    10/23/23 1909                   Procedures     Imaging Results (All)       Procedure Component Value Units Date/Time    MRI Brain Without Contrast [[id number]] Collected: 10/23/23 2249     Updated: 10/23/23 2249    Narrative:        Patient: NIMESH ROCHA  Time Out: 22:48  Exam(s): MRI HEAD Without Contrast     EXAM:    MR Head Without Intravenous Contrast    CLINICAL HISTORY:     Reason for exam: Stroke, follow up.    TECHNIQUE:    Magnetic resonance images of the head brain without intravenous   contrast in multiple planes.    COMPARISON:    No relevant prior studies available.    FINDINGS:    Brain: No acute stroke.  Redemonstrated now subacute nonhemorrhagic   small left cerebellar infarct.  Small old right thalamic lacunar infarct.    No acute hemorrhage or abnormal extra-axial fluid collection.  Minimal   scattered supratentorial white matter hyperintensities on FLAIR and T2-  weighted images.    Ventricles:  No midline shift.  No ventriculomegaly.    Bones joints:  Unremarkable.    Sinuses:  Unremarkable as visualized.  No acute sinusitis.    Mastoid air cells:  Unremarkable as visualized.  No mastoid effusion.    Orbits:  Unremarkable as visualized.    IMPRESSION:       No acute stroke or bleed.  Subacute small left cerebellar infarct.  Small old right thalamic lacunar infarct.  Minimal non-specific white matter changes, most commonly seen with small   vessel disease.        Impression:          Electronically signed by Dawood Ace M.D. on 10-23-23 at 2248    CT Angiogram Head w AI Analysis of LVO [[id number]] Collected: 10/23/23 2026     Updated: 10/23/23 2203    Narrative:      CT ANGIOGRAM HEAD AND NECK WITH IV CONTRAST, CT CEREBRAL PERFUSION WITH  AND WITHOUT CONTRAST.     HISTORY: Neuro deficit. Suspected acute stroke. Hand and face tingling.     TECHNIQUE: Head CT includes axial measuring from the base of skull to  the vertex without IV contrast. CT angiogram head and neck includes  axial imaging with IV contrast and data  reconstructed in coronal and  sagittal planes and three-dimensional volume rendering was performed. AI  analysis of LVO was utilized. CT cerebral perfusion exam was obtained  with and without IV contrast with use of rapid software.      COMPARISON: MRI brain 09/02/2023, CT head 08/21/2023, CT angiogram head  and neck 08/19/2023, CT angiogram head and neck 08/16/2023.     FINDINGS: Noncontrasted head CT demonstrates small area of low density  within the left cerebellar hemisphere that correlates with the  inferomedial left cerebellar hemispheres infarction was demonstrated to  be acute on brain MRI 08/16/2023. There are no abnormal areas of  increased attenuation intra-axial lead to suggest hemorrhage. No  extra-axial fluid collection is observed. There is no evidence for mass  effect or shift of the midline structures. CT cerebral perfusion exam  demonstrates 0 mL where there is cerebral blood flow less than 30%.  There is delayed flow to the left cerebral hemisphere focally where  there is Tmax greater than 6 seconds to an area measuring 6 mL and this  is in the similar region as the cerebellar infarction demonstrated on  previous brain MRI.     Atherosclerotic calcifications are present involving the aortic arch.  Great vessel origins are patent. There are partially calcified plaques  involving the carotid bulbs and ICA origins similar to the previous CT  angiogram without evidence for NASCET stenosis. The distal cervical,  petrous, cavernous, supracavernous internal carotid arteries are patent.  Atherosclerotic calcifications are present involving the cavernous and  supracavernous segments of both internal carotid arteries with mild  narrowing. Both middle cerebral arteries and anterior cerebral arteries  are patent.     Right vertebral artery is larger than the left and there is moderate  narrowing at the origin of the right vertebral artery. Both cervical  vertebral arteries are patent. The intracranial, post  PICA segment of  the left vertebral artery is exceedingly small though this appears  similar to the prior exam. There is mild to moderate narrowing of the  intracranial segment right vertebral artery. There is mild to moderate  narrowing of the basilar artery. Both posterior cerebral arteries are  patent. There is mostly fetal origin of the right PCA.       Impression:      Left cerebellar hemisphere infarction was demonstrated to be  acute on brain MRI 08/16/2023. No evidence for hemorrhagic  conversion/intracranial hemorrhage. There is delayed flow to the  inferomedial left cerebral hemisphere in the region of the infarction  without area of diminished cerebral blood flow. CT angiogram head and  neck appears similar to the prior exam 08/19/2023. Atherosclerotic  calcifications are present involving the carotid bulbs and proximal  ICA's without NASCET stenosis. The left post PICA vertebral artery is  very small. 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 Moderate narrowing origin of the right vertebral  artery. Further evaluation could be performed with brain MRI if  indicated.     Findings of the noncontrast exam discussed with Dr. Rodriguez at 7:28 p.m.  Findings of the contrasted exam discussed with Dr. Rodriguez at 7:50 p.m. AI  analysis of LVO was utilized.  Radiation dose reduction techniques were  utilized, including automated exposure control and exposure modulation  based on body size.           This report was finalized on 10/23/2023 10:00 PM by Dr. Yohannes Sagastume M.D on Workstation: [id number]       CT Angiogram Neck [[id number]] Collected: 10/23/23 2026     Updated: 10/23/23 2203    Narrative:      CT ANGIOGRAM HEAD AND NECK WITH IV CONTRAST, CT CEREBRAL PERFUSION WITH  AND WITHOUT CONTRAST.     HISTORY: Neuro deficit. Suspected acute stroke. Hand and face tingling.     TECHNIQUE: Head CT includes axial  measuring from the base of skull to  the vertex without IV contrast. CT angiogram head and neck includes  axial imaging with IV contrast and data reconstructed in coronal and  sagittal planes and three-dimensional volume rendering was performed. AI  analysis of LVO was utilized. CT cerebral perfusion exam was obtained  with and without IV contrast with use of rapid software.      COMPARISON: MRI brain 09/02/2023, CT head 08/21/2023, CT angiogram head  and neck 08/19/2023, CT angiogram head and neck 08/16/2023.     FINDINGS: Noncontrasted head CT demonstrates small area of low density  within the left cerebellar hemisphere that correlates with the  inferomedial left cerebellar hemispheres infarction was demonstrated to  be acute on brain MRI 08/16/2023. There are no abnormal areas of  increased attenuation intra-axial lead to suggest hemorrhage. No  extra-axial fluid collection is observed. There is no evidence for mass  effect or shift of the midline structures. CT cerebral perfusion exam  demonstrates 0 mL where there is cerebral blood flow less than 30%.  There is delayed flow to the left cerebral hemisphere focally where  there is Tmax greater than 6 seconds to an area measuring 6 mL and this  is in the similar region as the cerebellar infarction demonstrated on  previous brain MRI.     Atherosclerotic calcifications are present involving the aortic arch.  Great vessel origins are patent. There are partially calcified plaques  involving the carotid bulbs and ICA origins similar to the previous CT  angiogram without evidence for NASCET stenosis. The distal cervical,  petrous, cavernous, supracavernous internal carotid arteries are patent.  Atherosclerotic calcifications are present involving the cavernous and  supracavernous segments of both internal carotid arteries with mild  narrowing. Both middle cerebral arteries and anterior cerebral arteries  are patent.     Right vertebral artery is larger than the left  and there is moderate  narrowing at the origin of the right vertebral artery. Both cervical  vertebral arteries are patent. The intracranial, post PICA segment of  the left vertebral artery is exceedingly small though this appears  similar to the prior exam. There is mild to moderate narrowing of the  intracranial segment right vertebral artery. There is mild to moderate  narrowing of the basilar artery. Both posterior cerebral arteries are  patent. There is mostly fetal origin of the right PCA.       Impression:      Left cerebellar hemisphere infarction was demonstrated to be  acute on brain MRI 08/16/2023. No evidence for hemorrhagic  conversion/intracranial hemorrhage. There is delayed flow to the  inferomedial left cerebral hemisphere in the region of the infarction  without area of diminished cerebral blood flow. CT angiogram head and  neck appears similar to the prior exam 08/19/2023. Atherosclerotic  calcifications are present involving the carotid bulbs and proximal  ICA's without NASCET stenosis. The left post PICA vertebral artery is  very small. 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 Moderate narrowing origin of the right vertebral  artery. Further evaluation could be performed with brain MRI if  indicated.     Findings of the noncontrast exam discussed with Dr. Rodriguez at 7:28 p.m.  Findings of the contrasted exam discussed with Dr. Rodriguez at 7:50 p.m. AI  analysis of LVO was utilized.  Radiation dose reduction techniques were  utilized, including automated exposure control and exposure modulation  based on body size.           This report was finalized on 10/23/2023 10:00 PM by Dr. Yohannes Sagastume M.D on Workstation: [id number]       CT CEREBRAL PERFUSION WITH & WITHOUT CONTRAST [[id number]] Collected: 10/23/23 2026     Updated: 10/23/23 2203    Narrative:      CT ANGIOGRAM HEAD AND NECK WITH IV  CONTRAST, CT CEREBRAL PERFUSION WITH  AND WITHOUT CONTRAST.     HISTORY: Neuro deficit. Suspected acute stroke. Hand and face tingling.     TECHNIQUE: Head CT includes axial measuring from the base of skull to  the vertex without IV contrast. CT angiogram head and neck includes  axial imaging with IV contrast and data reconstructed in coronal and  sagittal planes and three-dimensional volume rendering was performed. AI  analysis of LVO was utilized. CT cerebral perfusion exam was obtained  with and without IV contrast with use of rapid software.      COMPARISON: MRI brain 09/02/2023, CT head 08/21/2023, CT angiogram head  and neck 08/19/2023, CT angiogram head and neck 08/16/2023.     FINDINGS: Noncontrasted head CT demonstrates small area of low density  within the left cerebellar hemisphere that correlates with the  inferomedial left cerebellar hemispheres infarction was demonstrated to  be acute on brain MRI 08/16/2023. There are no abnormal areas of  increased attenuation intra-axial lead to suggest hemorrhage. No  extra-axial fluid collection is observed. There is no evidence for mass  effect or shift of the midline structures. CT cerebral perfusion exam  demonstrates 0 mL where there is cerebral blood flow less than 30%.  There is delayed flow to the left cerebral hemisphere focally where  there is Tmax greater than 6 seconds to an area measuring 6 mL and this  is in the similar region as the cerebellar infarction demonstrated on  previous brain MRI.     Atherosclerotic calcifications are present involving the aortic arch.  Great vessel origins are patent. There are partially calcified plaques  involving the carotid bulbs and ICA origins similar to the previous CT  angiogram without evidence for NASCET stenosis. The distal cervical,  petrous, cavernous, supracavernous internal carotid arteries are patent.  Atherosclerotic calcifications are present involving the cavernous and  supracavernous segments of both  internal carotid arteries with mild  narrowing. Both middle cerebral arteries and anterior cerebral arteries  are patent.     Right vertebral artery is larger than the left and there is moderate  narrowing at the origin of the right vertebral artery. Both cervical  vertebral arteries are patent. The intracranial, post PICA segment of  the left vertebral artery is exceedingly small though this appears  similar to the prior exam. There is mild to moderate narrowing of the  intracranial segment right vertebral artery. There is mild to moderate  narrowing of the basilar artery. Both posterior cerebral arteries are  patent. There is mostly fetal origin of the right PCA.       Impression:      Left cerebellar hemisphere infarction was demonstrated to be  acute on brain MRI 08/16/2023. No evidence for hemorrhagic  conversion/intracranial hemorrhage. There is delayed flow to the  inferomedial left cerebral hemisphere in the region of the infarction  without area of diminished cerebral blood flow. CT angiogram head and  neck appears similar to the prior exam 08/19/2023. Atherosclerotic  calcifications are present involving the carotid bulbs and proximal  ICA's without NASCET stenosis. The left post PICA vertebral artery is  very small. 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 Moderate narrowing origin of the right vertebral  artery. Further evaluation could be performed with brain MRI if  indicated.     Findings of the noncontrast exam discussed with Dr. Rodriguez at 7:28 p.m.  Findings of the contrasted exam discussed with Dr. Rodriguez at 7:50 p.m. AI  analysis of LVO was utilized.  Radiation dose reduction techniques were  utilized, including automated exposure control and exposure modulation  based on body size.           This report was finalized on 10/23/2023 10:00 PM by Dr. Yohannes Sagastume M.D on Workstation: [id number]        XR Chest 1 View [[id number]] Collected: 10/23/23 1955     Updated: 10/23/23 2000    Narrative:      XR CHEST 1 VW-10/23/2023     HISTORY: Acute stroke protocol.     Heart size is at the upper limits of normal. Lungs appear free of acute  infiltrates.     Bones and soft tissues are unremarkable. There is mildly prominent soft  tissue in the medial aspect of the right apex probably ectatic vascular  structures and similar to the 8/19/2023 study.       Impression:      1. No acute process.     This report was finalized on 10/23/2023 7:57 PM by Dr. Salvador Arita M.D on Workstation: [id number]               Pertinent Labs     Results from last 7 days   Lab Units 10/23/23  1909   WBC 10*3/mm3 8.87   HEMOGLOBIN g/dL 13.1   PLATELETS 10*3/mm3 269     Results from last 7 days   Lab Units 10/23/23  1909   SODIUM mmol/L 143   POTASSIUM mmol/L 3.6   CHLORIDE mmol/L 105   CO2 mmol/L 29.0   BUN mg/dL 20   CREATININE mg/dL 1.19   GLUCOSE mg/dL 131*   Estimated Creatinine Clearance: 91.9 mL/min (by C-G formula based on SCr of 1.19 mg/dL).  Results from last 7 days   Lab Units 10/23/23  1909   ALBUMIN g/dL 4.3   BILIRUBIN mg/dL 0.3   ALK PHOS U/L 68   AST (SGOT) U/L 23   ALT (SGPT) U/L 29     Results from last 7 days   Lab Units 10/23/23  1909   CALCIUM mg/dL 9.3   ALBUMIN g/dL 4.3       Results from last 7 days   Lab Units 10/23/23  1909   HSTROP T ng/L 8       Results from last 7 days   Lab Units 10/24/23  0637   CHOLESTEROL mg/dL 127   TRIGLYCERIDES mg/dL 88   HDL CHOL mg/dL 39*   LDL CHOL mg/dL 71           Test Results Pending at Discharge       Discharge Details        Discharge Medications        New Medications        Instructions Start Date   ticagrelor 60 MG tablet tablet  Commonly known as: BRILINTA   60 mg, Oral, 2 Times Daily             Changes to Medications        Instructions Start Date   atorvastatin 80 MG tablet  Commonly known as: LIPITOR  What changed:   medication strength  how much to take   80 mg, Oral,  Nightly             Continue These Medications        Instructions Start Date   amLODIPine 10 MG tablet  Commonly known as: NORVASC   5 mg, Oral, Daily      aspirin 81 MG EC tablet   81 mg, Oral, Daily      carvedilol 6.25 MG tablet  Commonly known as: COREG   6.25 mg, Oral, 2 Times Daily With Meals      hydrALAZINE 100 MG tablet  Commonly known as: APRESOLINE   100 mg, Oral, Every 8 Hours Scheduled      hydroCHLOROthiazide 12.5 MG tablet  Commonly known as: HYDRODIURIL   12.5 mg, Oral, Daily      hydrOXYzine 25 MG tablet  Commonly known as: ATARAX   25 mg, Oral, 3 Times Daily PRN      lisinopril 40 MG tablet  Commonly known as: PRINIVIL,ZESTRIL   40 mg, Oral, Daily      meclizine 25 MG tablet  Commonly known as: ANTIVERT       spironolactone 25 MG tablet  Commonly known as: ALDACTONE   25 mg, Oral, Daily      vilazodone 40 MG tablet tablet  Commonly known as: Viibryd   40 mg, Oral, Daily               No Known Allergies      Discharge Disposition:  Home or Self Care    Discharge Diet:  Diet Order   Procedures    Diet: Cardiac Diets; Healthy Heart (2-3 Na+); Texture: Regular Texture (IDDSI 7); Fluid Consistency: Thin (IDDSI 0)       Discharge Activity:   Activity Instructions       Activity as Tolerated              CODE STATUS:    There are no questions and answers to display.       Future Appointments   Date Time Provider Department Center   11/29/2023  2:00 PM Sravanthi Mcmahon APRN [facility]   12/18/2023 10:00 AM Akash Rodriguez Jr., DO [facility]   12/22/2023 10:15 AM Chinmay Taylor MD [facility]     Additional Instructions for the Follow-ups that You Need to Schedule       Ambulatory Referral to Physical Therapy Evaluate and treat   As directed      Specialty needed: Evaluate and treat   Follow-up needed: No        Discharge Follow-up with PCP   As directed       Currently Documented PCP:    Akash Rodriguez Jr., DO    PCP Phone Number:    [phone]     Follow Up  Details: post-hospital follow up        Discharge Follow-up with Specified Provider: Neurology at your scheduled appointment on 11/29   As directed      To: Neurology at your scheduled appointment on 11/29               Follow-up Information       Akash Rodriguez Jr., DO .    Specialties: Sports Medicine, Family Medicine, Emergency Medicine  Why: post-hospital follow up  Contact information:  [address]  [facility] [address]  [place] [address]  [phone]                             Additional Instructions for the Follow-ups that You Need to Schedule       Ambulatory Referral to Physical Therapy Evaluate and treat   As directed      Specialty needed: Evaluate and treat   Follow-up needed: No        Discharge Follow-up with PCP   As directed       Currently Documented PCP:    Akash Rodriguez Jr., DO    PCP Phone Number:    [phone]     Follow Up Details: post-hospital follow up        Discharge Follow-up with Specified Provider: Neurology at your scheduled appointment on 11/29   As directed      To: Neurology at your scheduled appointment on 11/29            Time Spent on Discharge:  I spent greater than 30 minutes on this discharge activity which included: face-to-face encounter with the patient, reviewing the data in the system, coordination of the care with the nursing staff as well as consultants, documentation, and entering orders.       Edelmira Trujillo MD  [facility]  10/25/23  08:55 EDT

## 2023-10-25 NOTE — PROGRESS NOTES
[facility] Acute Inpt Rehab     Following per stroke order set.  Chart reviewed.    Patient worked with physical therapy yesterday, and patient was able to walk 400 feet with no assistive device and only contact guard assistance.    Patient declined to work with occupational therapy, stating not having any needs.    At this time, no apparent need for acute rehab.    Will sign off.    Thanks,   Ijeoma Thakkar RN  Rehab Admission Nurse

## 2023-10-26 ENCOUNTER — APPOINTMENT (OUTPATIENT)
Dept: CT IMAGING | Facility: HOSPITAL | Age: 58
End: 2023-10-26
Payer: COMMERCIAL

## 2023-10-26 ENCOUNTER — APPOINTMENT (OUTPATIENT)
Dept: MRI IMAGING | Facility: HOSPITAL | Age: 58
End: 2023-10-26
Payer: COMMERCIAL

## 2023-10-26 ENCOUNTER — APPOINTMENT (OUTPATIENT)
Dept: GENERAL RADIOLOGY | Facility: HOSPITAL | Age: 58
End: 2023-10-26
Payer: COMMERCIAL

## 2023-10-26 ENCOUNTER — HOSPITAL ENCOUNTER (OUTPATIENT)
Facility: HOSPITAL | Age: 58
Setting detail: OBSERVATION
End: 2023-11-06
Attending: EMERGENCY MEDICINE | Admitting: INTERNAL MEDICINE
Payer: COMMERCIAL

## 2023-10-26 DIAGNOSIS — R79.89 ELEVATED SERUM CREATININE: ICD-10-CM

## 2023-10-26 DIAGNOSIS — S09.90XA CLOSED HEAD INJURY, INITIAL ENCOUNTER: ICD-10-CM

## 2023-10-26 DIAGNOSIS — R42 VERTIGO: Primary | ICD-10-CM

## 2023-10-26 DIAGNOSIS — R73.9 HYPERGLYCEMIA: ICD-10-CM

## 2023-10-26 DIAGNOSIS — S01.81XA LACERATION OF FOREHEAD, INITIAL ENCOUNTER: ICD-10-CM

## 2023-10-26 PROBLEM — G45.9 TIA (TRANSIENT ISCHEMIC ATTACK): Status: ACTIVE | Noted: 2023-10-26

## 2023-10-26 LAB
ABO GROUP BLD: NORMAL
ALBUMIN SERPL-MCNC: 4.9 G/DL (ref 3.5–5.2)
ALBUMIN/GLOB SERPL: 1.8 G/DL
ALP SERPL-CCNC: 73 U/L (ref 39–117)
ALT SERPL W P-5'-P-CCNC: 42 U/L (ref 1–41)
ANION GAP SERPL CALCULATED.3IONS-SCNC: 19 MMOL/L (ref 5–15)
APTT PPP: 28.2 SECONDS (ref 22.7–35.4)
AST SERPL-CCNC: 24 U/L (ref 1–40)
BASOPHILS # BLD AUTO: 0.04 10*3/MM3 (ref 0–0.2)
BASOPHILS NFR BLD AUTO: 0.4 % (ref 0–1.5)
BILIRUB SERPL-MCNC: 0.7 MG/DL (ref 0–1.2)
BLD GP AB SCN SERPL QL: NEGATIVE
BUN SERPL-MCNC: 20 MG/DL (ref 6–20)
BUN/CREAT SERPL: 13.7 (ref 7–25)
CALCIUM SPEC-SCNC: 9.7 MG/DL (ref 8.6–10.5)
CHLORIDE SERPL-SCNC: 102 MMOL/L (ref 98–107)
CO2 SERPL-SCNC: 19 MMOL/L (ref 22–29)
CREAT SERPL-MCNC: 1.46 MG/DL (ref 0.76–1.27)
DEPRECATED RDW RBC AUTO: 42.9 FL (ref 37–54)
EGFRCR SERPLBLD CKD-EPI 2021: 56.1 ML/MIN/1.73
EOSINOPHIL # BLD AUTO: 0.58 10*3/MM3 (ref 0–0.4)
EOSINOPHIL NFR BLD AUTO: 6 % (ref 0.3–6.2)
ERYTHROCYTE [DISTWIDTH] IN BLOOD BY AUTOMATED COUNT: 15 % (ref 12.3–15.4)
GLOBULIN UR ELPH-MCNC: 2.7 GM/DL
GLUCOSE SERPL-MCNC: 127 MG/DL (ref 65–99)
HCT VFR BLD AUTO: 42.7 % (ref 37.5–51)
HGB BLD-MCNC: 14.5 G/DL (ref 13–17.7)
HOLD SPECIMEN: NORMAL
HOLD SPECIMEN: NORMAL
IMM GRANULOCYTES # BLD AUTO: 0.04 10*3/MM3 (ref 0–0.05)
IMM GRANULOCYTES NFR BLD AUTO: 0.4 % (ref 0–0.5)
INR PPP: 0.99 (ref 0.9–1.1)
LYMPHOCYTES # BLD AUTO: 2.43 10*3/MM3 (ref 0.7–3.1)
LYMPHOCYTES NFR BLD AUTO: 25 % (ref 19.6–45.3)
MCH RBC QN AUTO: 27.6 PG (ref 26.6–33)
MCHC RBC AUTO-ENTMCNC: 34 G/DL (ref 31.5–35.7)
MCV RBC AUTO: 81.2 FL (ref 79–97)
MONOCYTES # BLD AUTO: 0.92 10*3/MM3 (ref 0.1–0.9)
MONOCYTES NFR BLD AUTO: 9.5 % (ref 5–12)
NEUTROPHILS NFR BLD AUTO: 5.7 10*3/MM3 (ref 1.7–7)
NEUTROPHILS NFR BLD AUTO: 58.7 % (ref 42.7–76)
NRBC BLD AUTO-RTO: 0 /100 WBC (ref 0–0.2)
PLATELET # BLD AUTO: 328 10*3/MM3 (ref 140–450)
PMV BLD AUTO: 9.8 FL (ref 6–12)
POTASSIUM SERPL-SCNC: 3.7 MMOL/L (ref 3.5–5.2)
PROT SERPL-MCNC: 7.6 G/DL (ref 6–8.5)
PROTHROMBIN TIME: 13.2 SECONDS (ref 11.7–14.2)
QT INTERVAL: 431 MS
QTC INTERVAL: 459 MS
RBC # BLD AUTO: 5.26 10*6/MM3 (ref 4.14–5.8)
RH BLD: POSITIVE
SODIUM SERPL-SCNC: 140 MMOL/L (ref 136–145)
T&S EXPIRATION DATE: NORMAL
TROPONIN T SERPL HS-MCNC: 10 NG/L
WBC NRBC COR # BLD: 9.71 10*3/MM3 (ref 3.4–10.8)
WHOLE BLOOD HOLD COAG: NORMAL
WHOLE BLOOD HOLD SPECIMEN: NORMAL

## 2023-10-26 PROCEDURE — 99285 EMERGENCY DEPT VISIT HI MDM: CPT

## 2023-10-26 PROCEDURE — 25010000002 LORAZEPAM PER 2 MG: Performed by: EMERGENCY MEDICINE

## 2023-10-26 PROCEDURE — 86900 BLOOD TYPING SEROLOGIC ABO: CPT | Performed by: EMERGENCY MEDICINE

## 2023-10-26 PROCEDURE — 82948 REAGENT STRIP/BLOOD GLUCOSE: CPT

## 2023-10-26 PROCEDURE — 70551 MRI BRAIN STEM W/O DYE: CPT

## 2023-10-26 PROCEDURE — 93010 ELECTROCARDIOGRAM REPORT: CPT | Performed by: INTERNAL MEDICINE

## 2023-10-26 PROCEDURE — 84484 ASSAY OF TROPONIN QUANT: CPT | Performed by: EMERGENCY MEDICINE

## 2023-10-26 PROCEDURE — 85025 COMPLETE CBC W/AUTO DIFF WBC: CPT | Performed by: EMERGENCY MEDICINE

## 2023-10-26 PROCEDURE — G0378 HOSPITAL OBSERVATION PER HR: HCPCS

## 2023-10-26 PROCEDURE — 85610 PROTHROMBIN TIME: CPT | Performed by: EMERGENCY MEDICINE

## 2023-10-26 PROCEDURE — 80053 COMPREHEN METABOLIC PANEL: CPT | Performed by: EMERGENCY MEDICINE

## 2023-10-26 PROCEDURE — 96374 THER/PROPH/DIAG INJ IV PUSH: CPT

## 2023-10-26 PROCEDURE — 86901 BLOOD TYPING SEROLOGIC RH(D): CPT | Performed by: EMERGENCY MEDICINE

## 2023-10-26 PROCEDURE — 86850 RBC ANTIBODY SCREEN: CPT | Performed by: EMERGENCY MEDICINE

## 2023-10-26 PROCEDURE — 93005 ELECTROCARDIOGRAM TRACING: CPT | Performed by: EMERGENCY MEDICINE

## 2023-10-26 PROCEDURE — 71045 X-RAY EXAM CHEST 1 VIEW: CPT

## 2023-10-26 PROCEDURE — 85730 THROMBOPLASTIN TIME PARTIAL: CPT | Performed by: EMERGENCY MEDICINE

## 2023-10-26 PROCEDURE — 70450 CT HEAD/BRAIN W/O DYE: CPT

## 2023-10-26 RX ORDER — POLYETHYLENE GLYCOL 3350 17 G/17G
17 POWDER, FOR SOLUTION ORAL DAILY PRN
Status: DISCONTINUED | OUTPATIENT
Start: 2023-10-26 | End: 2023-11-06 | Stop reason: HOSPADM

## 2023-10-26 RX ORDER — BISACODYL 10 MG
10 SUPPOSITORY, RECTAL RECTAL DAILY PRN
Status: DISCONTINUED | OUTPATIENT
Start: 2023-10-26 | End: 2023-11-06 | Stop reason: HOSPADM

## 2023-10-26 RX ORDER — LORAZEPAM 2 MG/ML
1 INJECTION INTRAMUSCULAR ONCE
Status: COMPLETED | OUTPATIENT
Start: 2023-10-26 | End: 2023-10-26

## 2023-10-26 RX ORDER — SODIUM CHLORIDE 0.9 % (FLUSH) 0.9 %
10 SYRINGE (ML) INJECTION EVERY 12 HOURS SCHEDULED
Status: DISCONTINUED | OUTPATIENT
Start: 2023-10-26 | End: 2023-11-06 | Stop reason: HOSPADM

## 2023-10-26 RX ORDER — LIDOCAINE HYDROCHLORIDE AND EPINEPHRINE 10; 10 MG/ML; UG/ML
10 INJECTION, SOLUTION INFILTRATION; PERINEURAL ONCE
Status: DISCONTINUED | OUTPATIENT
Start: 2023-10-26 | End: 2023-11-06 | Stop reason: HOSPADM

## 2023-10-26 RX ORDER — SODIUM CHLORIDE 9 MG/ML
40 INJECTION, SOLUTION INTRAVENOUS AS NEEDED
Status: DISCONTINUED | OUTPATIENT
Start: 2023-10-26 | End: 2023-11-06 | Stop reason: HOSPADM

## 2023-10-26 RX ORDER — SODIUM CHLORIDE 0.9 % (FLUSH) 0.9 %
10 SYRINGE (ML) INJECTION AS NEEDED
Status: DISCONTINUED | OUTPATIENT
Start: 2023-10-26 | End: 2023-11-06 | Stop reason: HOSPADM

## 2023-10-26 RX ORDER — AMOXICILLIN 250 MG
2 CAPSULE ORAL 2 TIMES DAILY
Status: DISCONTINUED | OUTPATIENT
Start: 2023-10-26 | End: 2023-11-06 | Stop reason: HOSPADM

## 2023-10-26 RX ORDER — BISACODYL 5 MG/1
5 TABLET, DELAYED RELEASE ORAL DAILY PRN
Status: DISCONTINUED | OUTPATIENT
Start: 2023-10-26 | End: 2023-11-06 | Stop reason: HOSPADM

## 2023-10-26 RX ADMIN — LORAZEPAM 1 MG: 2 INJECTION INTRAMUSCULAR; INTRAVENOUS at 23:32

## 2023-10-26 RX ADMIN — Medication 10 ML: at 23:23

## 2023-10-26 NOTE — OUTREACH NOTE
Prep Survey      Flowsheet Row Responses   [facility] facility patient discharged from? [place]   Is LACE score < 7 ? No   Eligibility Readm Mgmt   Discharge diagnosis 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 (I63.542)   Does the patient have one of the following disease processes/diagnoses(primary or secondary)? Stroke   Does the patient have Home health ordered? No   Is there a DME ordered? No   Prep survey completed? Yes            NATHANAEL PRITCHETT - Registered Nurse

## 2023-10-27 ENCOUNTER — READMISSION MANAGEMENT (OUTPATIENT)
Dept: CALL CENTER | Facility: HOSPITAL | Age: 58
End: 2023-10-27
Payer: COMMERCIAL

## 2023-10-27 PROBLEM — G45.9 TIA (TRANSIENT ISCHEMIC ATTACK): Status: RESOLVED | Noted: 2023-10-26 | Resolved: 2023-10-27

## 2023-10-27 PROBLEM — S06.0XAA CONCUSSION: Status: ACTIVE | Noted: 2023-10-27

## 2023-10-27 PROBLEM — S06.0XAA HEAD INJURY, CLOSED, WITH CONCUSSION: Status: ACTIVE | Noted: 2023-10-27
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
## 2023-10-27 LAB
ANION GAP SERPL CALCULATED.3IONS-SCNC: 10.8 MMOL/L (ref 5–15)
BUN SERPL-MCNC: 21 MG/DL (ref 6–20)
BUN/CREAT SERPL: 19.8 (ref 7–25)
CALCIUM SPEC-SCNC: 8.7 MG/DL (ref 8.6–10.5)
CHLORIDE SERPL-SCNC: 109 MMOL/L (ref 98–107)
CO2 SERPL-SCNC: 24.2 MMOL/L (ref 22–29)
CREAT SERPL-MCNC: 1.06 MG/DL (ref 0.76–1.27)
EGFRCR SERPLBLD CKD-EPI 2021: 82.4 ML/MIN/1.73
GLUCOSE SERPL-MCNC: 95 MG/DL (ref 65–99)
POTASSIUM SERPL-SCNC: 3.8 MMOL/L (ref 3.5–5.2)
SODIUM SERPL-SCNC: 144 MMOL/L (ref 136–145)

## 2023-10-27 PROCEDURE — 96361 HYDRATE IV INFUSION ADD-ON: CPT

## 2023-10-27 PROCEDURE — 97530 THERAPEUTIC ACTIVITIES: CPT

## 2023-10-27 PROCEDURE — 25010000002 SODIUM CHLORIDE 0.9 % WITH KCL 20 MEQ 20-0.9 MEQ/L-% SOLUTION: Performed by: INTERNAL MEDICINE

## 2023-10-27 PROCEDURE — 25010000002 DIPHENHYDRAMINE PER 50 MG: Performed by: STUDENT IN AN ORGANIZED HEALTH CARE EDUCATION/TRAINING PROGRAM

## 2023-10-27 PROCEDURE — 97162 PT EVAL MOD COMPLEX 30 MIN: CPT

## 2023-10-27 PROCEDURE — 25010000002 PROCHLORPERAZINE 10 MG/2ML SOLUTION: Performed by: STUDENT IN AN ORGANIZED HEALTH CARE EDUCATION/TRAINING PROGRAM

## 2023-10-27 PROCEDURE — 97112 NEUROMUSCULAR REEDUCATION: CPT

## 2023-10-27 PROCEDURE — 25010000002 LORAZEPAM PER 2 MG: Performed by: STUDENT IN AN ORGANIZED HEALTH CARE EDUCATION/TRAINING PROGRAM

## 2023-10-27 PROCEDURE — 25010000002 LORAZEPAM PER 2 MG: Performed by: EMERGENCY MEDICINE

## 2023-10-27 PROCEDURE — G0378 HOSPITAL OBSERVATION PER HR: HCPCS

## 2023-10-27 PROCEDURE — 99252 IP/OBS CONSLTJ NEW/EST SF 35: CPT | Performed by: PSYCHIATRY & NEUROLOGY

## 2023-10-27 PROCEDURE — 96375 TX/PRO/DX INJ NEW DRUG ADDON: CPT

## 2023-10-27 PROCEDURE — 97165 OT EVAL LOW COMPLEX 30 MIN: CPT

## 2023-10-27 PROCEDURE — 25810000003 SODIUM CHLORIDE 0.9 % SOLUTION: Performed by: NURSE PRACTITIONER

## 2023-10-27 PROCEDURE — 96376 TX/PRO/DX INJ SAME DRUG ADON: CPT

## 2023-10-27 PROCEDURE — 80048 BASIC METABOLIC PNL TOTAL CA: CPT | Performed by: NURSE PRACTITIONER

## 2023-10-27 RX ORDER — AMLODIPINE BESYLATE 5 MG/1
5 TABLET ORAL DAILY
Status: DISCONTINUED | OUTPATIENT
Start: 2023-10-27 | End: 2023-11-06 | Stop reason: HOSPADM

## 2023-10-27 RX ORDER — VILAZODONE HYDROCHLORIDE 40 MG/1
40 TABLET ORAL DAILY
Status: DISCONTINUED | OUTPATIENT
Start: 2023-10-27 | End: 2023-11-06 | Stop reason: HOSPADM

## 2023-10-27 RX ORDER — THIAMINE HYDROCHLORIDE 100 MG/ML
200 INJECTION, SOLUTION INTRAMUSCULAR; INTRAVENOUS EVERY 8 HOURS SCHEDULED
Status: DISCONTINUED | OUTPATIENT
Start: 2023-10-27 | End: 2023-11-01

## 2023-10-27 RX ORDER — LISINOPRIL 40 MG/1
40 TABLET ORAL DAILY
Status: DISCONTINUED | OUTPATIENT
Start: 2023-10-27 | End: 2023-11-06 | Stop reason: HOSPADM

## 2023-10-27 RX ORDER — HYDROCHLOROTHIAZIDE 12.5 MG/1
12.5 TABLET ORAL DAILY
Status: DISCONTINUED | OUTPATIENT
Start: 2023-10-27 | End: 2023-10-27

## 2023-10-27 RX ORDER — ACETAMINOPHEN 325 MG/1
650 TABLET ORAL EVERY 6 HOURS PRN
Status: DISCONTINUED | OUTPATIENT
Start: 2023-10-27 | End: 2023-11-06 | Stop reason: HOSPADM

## 2023-10-27 RX ORDER — PROCHLORPERAZINE EDISYLATE 5 MG/ML
5 INJECTION INTRAMUSCULAR; INTRAVENOUS ONCE
Status: COMPLETED | OUTPATIENT
Start: 2023-10-27 | End: 2023-10-27

## 2023-10-27 RX ORDER — HYDROXYZINE HYDROCHLORIDE 25 MG/1
25 TABLET, FILM COATED ORAL 3 TIMES DAILY PRN
Status: DISCONTINUED | OUTPATIENT
Start: 2023-10-27 | End: 2023-11-02

## 2023-10-27 RX ORDER — DIPHENHYDRAMINE HYDROCHLORIDE 50 MG/ML
25 INJECTION INTRAMUSCULAR; INTRAVENOUS ONCE
Status: COMPLETED | OUTPATIENT
Start: 2023-10-27 | End: 2023-10-27

## 2023-10-27 RX ORDER — FOLIC ACID 1 MG/1
1 TABLET ORAL DAILY
Status: DISCONTINUED | OUTPATIENT
Start: 2023-10-27 | End: 2023-11-06 | Stop reason: HOSPADM

## 2023-10-27 RX ORDER — SODIUM CHLORIDE AND POTASSIUM CHLORIDE 150; 900 MG/100ML; MG/100ML
100 INJECTION, SOLUTION INTRAVENOUS CONTINUOUS
Status: DISCONTINUED | OUTPATIENT
Start: 2023-10-27 | End: 2023-10-30

## 2023-10-27 RX ORDER — BUTALBITAL, ACETAMINOPHEN AND CAFFEINE 50; 325; 40 MG/1; MG/1; MG/1
1 TABLET ORAL ONCE
Status: COMPLETED | OUTPATIENT
Start: 2023-10-27 | End: 2023-10-27

## 2023-10-27 RX ORDER — LORAZEPAM 2 MG/ML
2 INJECTION INTRAMUSCULAR
Status: DISCONTINUED | OUTPATIENT
Start: 2023-10-27 | End: 2023-11-01

## 2023-10-27 RX ORDER — LORAZEPAM 1 MG/1
1 TABLET ORAL
Status: DISCONTINUED | OUTPATIENT
Start: 2023-10-27 | End: 2023-11-01

## 2023-10-27 RX ORDER — HYDRALAZINE HYDROCHLORIDE 50 MG/1
100 TABLET, FILM COATED ORAL EVERY 8 HOURS SCHEDULED
Status: DISCONTINUED | OUTPATIENT
Start: 2023-10-27 | End: 2023-11-06 | Stop reason: HOSPADM

## 2023-10-27 RX ORDER — LORAZEPAM 1 MG/1
2 TABLET ORAL
Status: DISCONTINUED | OUTPATIENT
Start: 2023-10-27 | End: 2023-11-01

## 2023-10-27 RX ORDER — CARVEDILOL 6.25 MG/1
6.25 TABLET ORAL 2 TIMES DAILY WITH MEALS
Status: DISCONTINUED | OUTPATIENT
Start: 2023-10-27 | End: 2023-11-06 | Stop reason: HOSPADM

## 2023-10-27 RX ORDER — LORAZEPAM 2 MG/ML
1 INJECTION INTRAMUSCULAR
Status: DISCONTINUED | OUTPATIENT
Start: 2023-10-27 | End: 2023-11-01

## 2023-10-27 RX ORDER — SODIUM CHLORIDE 9 MG/ML
100 INJECTION, SOLUTION INTRAVENOUS CONTINUOUS
Status: DISCONTINUED | OUTPATIENT
Start: 2023-10-27 | End: 2023-10-27

## 2023-10-27 RX ORDER — MULTIPLE VITAMINS W/ MINERALS TAB 9MG-400MCG
1 TAB ORAL DAILY
Status: DISCONTINUED | OUTPATIENT
Start: 2023-10-27 | End: 2023-11-06 | Stop reason: HOSPADM

## 2023-10-27 RX ORDER — SPIRONOLACTONE 25 MG/1
25 TABLET ORAL DAILY
Status: DISCONTINUED | OUTPATIENT
Start: 2023-10-27 | End: 2023-10-27

## 2023-10-27 RX ORDER — ASPIRIN 81 MG/1
81 TABLET ORAL DAILY
Status: DISCONTINUED | OUTPATIENT
Start: 2023-10-27 | End: 2023-11-06 | Stop reason: HOSPADM

## 2023-10-27 RX ORDER — ATORVASTATIN CALCIUM 80 MG/1
80 TABLET, FILM COATED ORAL NIGHTLY
Status: DISCONTINUED | OUTPATIENT
Start: 2023-10-27 | End: 2023-11-06 | Stop reason: HOSPADM

## 2023-10-27 RX ADMIN — ATORVASTATIN CALCIUM 80 MG: 80 TABLET, FILM COATED ORAL at 00:58

## 2023-10-27 RX ADMIN — HYDROXYZINE HYDROCHLORIDE 25 MG: 25 TABLET ORAL at 13:58

## 2023-10-27 RX ADMIN — VILAZODONE HYDROCHLORIDE 40 MG: 40 TABLET, FILM COATED ORAL at 09:02

## 2023-10-27 RX ADMIN — LORAZEPAM 2 MG: 2 INJECTION INTRAMUSCULAR; INTRAVENOUS at 17:21

## 2023-10-27 RX ADMIN — SODIUM CHLORIDE 1000 ML: 9 INJECTION, SOLUTION INTRAVENOUS at 00:52

## 2023-10-27 RX ADMIN — AMLODIPINE BESYLATE 5 MG: 5 TABLET ORAL at 13:30

## 2023-10-27 RX ADMIN — LORAZEPAM 2 MG: 2 INJECTION INTRAMUSCULAR; INTRAVENOUS at 16:50

## 2023-10-27 RX ADMIN — SENNOSIDES AND DOCUSATE SODIUM 2 TABLET: 50; 8.6 TABLET ORAL at 22:16

## 2023-10-27 RX ADMIN — CARVEDILOL 6.25 MG: 6.25 TABLET, FILM COATED ORAL at 13:32

## 2023-10-27 RX ADMIN — BUTALBITAL, ACETAMINOPHEN, AND CAFFEINE 1 TABLET: 50; 325; 40 TABLET ORAL at 08:59

## 2023-10-27 RX ADMIN — HYDRALAZINE HYDROCHLORIDE 100 MG: 50 TABLET ORAL at 13:30

## 2023-10-27 RX ADMIN — SODIUM CHLORIDE 100 ML/HR: 9 INJECTION, SOLUTION INTRAVENOUS at 01:54

## 2023-10-27 RX ADMIN — PROCHLORPERAZINE EDISYLATE 5 MG: 5 INJECTION INTRAMUSCULAR; INTRAVENOUS at 13:59

## 2023-10-27 RX ADMIN — ASPIRIN 81 MG: 81 TABLET, COATED ORAL at 13:30

## 2023-10-27 RX ADMIN — HYDRALAZINE HYDROCHLORIDE 100 MG: 50 TABLET ORAL at 22:16

## 2023-10-27 RX ADMIN — Medication 10 ML: at 09:01

## 2023-10-27 RX ADMIN — LISINOPRIL 40 MG: 20 TABLET ORAL at 09:02

## 2023-10-27 RX ADMIN — Medication 10 ML: at 21:00

## 2023-10-27 RX ADMIN — POTASSIUM CHLORIDE AND SODIUM CHLORIDE 100 ML/HR: 900; 150 INJECTION, SOLUTION INTRAVENOUS at 22:16

## 2023-10-27 RX ADMIN — DIPHENHYDRAMINE HYDROCHLORIDE 25 MG: 50 INJECTION, SOLUTION INTRAMUSCULAR; INTRAVENOUS at 13:59

## 2023-10-27 NOTE — DISCHARGE PLACEMENT REQUEST
"Flako Coreas (58 y.o. Male)       Date of Birth   1965    Social Security Number       Address   [address] [address]    Home Phone   [phone]    MRN   [id number]       Shinto   Anglican    Marital Status   Single                            Admission Date   10/26/23    Admission Type   Emergency    Admitting Provider   Rik Gordon MD    Attending Provider   Rik Gordon MD    Department, Room/Bed   [facility] OBSERVATION, 110/1       Discharge Date       Discharge Disposition       Discharge Destination                                 Attending Provider: Rik Gordon MD    Allergies: No Known Allergies    Isolation: None   Infection: None   Code Status: CPR    Ht: 188 cm (74\")   Wt: 105 kg (231 lb)    Admission Cmt: None   Principal Problem: TIA (transient ischemic attack) [G45.9]                   Active Insurance as of 10/26/2023       Primary Coverage       Payor Plan Insurance Group Employer/Plan Group    [employer]HEM BLUE CROSS ANTHEM BLUE CROSS BLUE SHIELD PPO [id number]       Payor Plan Address Payor Plan Phone Number Payor Plan Fax Number Effective Dates    [phone]  1/1/2023 - None Entered    [facility] [address]         Subscriber Name Subscriber Birth Date Member ID       COREAS,FLAKO KAVEH 1965 [id number]               Secondary Coverage       Payor Plan Insurance Group Employer/Plan Group    [facility] COMMUNITY PLAN [facility] COMMUNITY PLAN [facility]       Payor Plan Address Payor Plan Phone Number Payor Plan Fax Number Effective Dates    [address]   1/1/2021 - None Entered    [facility] [address]         Subscriber Name Subscriber Birth Date Member ID       FLAKO COREAS 1965 [id number]                     Emergency Contacts        (Rel.) Home Phone Work Phone Mobile Phone    Flako Coreas (Son) [phone] -- [phone]                "

## 2023-10-27 NOTE — CASE MANAGEMENT/SOCIAL WORK
Discharge Planning Assessment  [facility]     Patient Name: Flako Coreas  MRN: [id number]  Today's Date: 10/27/2023    Admit Date: 10/26/2023    Plan: Plans ?ST rehab at d/c-ERIC Castillo RN   Discharge Needs Assessment    No documentation.                  Discharge Plan       Row Name 10/27/23 1642       Plan    Plan Comments Entered room, introduced self and explained role to patient- discussed sub acute rehab referrals- discussed CMS ratings w/patient who requests a 5 star ratiing facility. [facility]errals placed to [facility], [facility], [facility], [facility]; Spoke w/liaisons from each- no beds at [facility] this weekend; [facility] and [facility] will follow.       Row Name 10/27/23 7862       Plan    Plan Comments Spoke w/ Cate in OT and Pat in PT, who stated that evals will be done this afternoon-ERIC Castillo RN                  Continued Care and Services - Admitted Since 10/26/2023       Destination       Service Provider Request Status Selected Services Address Phone Fax Patient Preferred    [facility] Pending - Request Sent N/A [address] [facility] [id number] [phone] --    [facility] Pending - Request Sent N/A [address] [facility] [address] [phone] --    [facility] Pending - Request Sent N/A [address] [facility] [address] [phone] --    [facility] Pending - Request Sent N/A [address] [facility] [address] [phone] --    [facility] Pending - Request Sent N/A [address] [facility] [address] [phone] --                  Selected Continued Care - Prior Encounters Includes continued care and service providers with selected services from prior encounters from 7/28/2023 to 10/27/2023      Discharged on 8/24/2023 Admission date: 8/19/2023 - Discharge disposition: Skilled  Nursing Facility (DC - External)      Destination       Service Provider Selected Services Address Phone Fax Patient Preferred    [facility] [address] [facility] [address] [phone] --                             Demographic Summary    No documentation.                  Functional Status    No documentation.                  Psychosocial    No documentation.                  Abuse/Neglect    No documentation.                  Legal    No documentation.                  Substance Abuse    No documentation.                  Patient Forms    No documentation.                     Trinity Good RN

## 2023-10-27 NOTE — ED NOTES
"Nursing report ED to floor  Flako Coreas  58 y.o.  male    HPI :   Chief Complaint   Patient presents with    Dizziness    Head Injury       Admitting doctor:   Rik ARREOLA MD    Admitting diagnosis:   There were no encounter diagnoses.    Code status:   Current Code Status       Date Active Code Status Order ID Comments User Context       10/26/2023 2136 CPR (Attempt to Resuscitate) [id number]  Flory Williamson APRN ED        Question Answer    Code Status (Patient has no pulse and is not breathing) CPR (Attempt to Resuscitate)    Medical Interventions (Patient has pulse or is breathing) Full Support    Level Of Support Discussed With Patient                    Allergies:   Patient has no known allergies.    Isolation:   No active isolations    Intake and Output  No intake or output data in the 24 hours ending 10/26/23 2137    Weight:       10/26/23  2012   Weight: 111 kg (245 lb)       Most recent vitals:   Vitals:    10/26/23 2012 10/26/23 2023 10/26/23 2028 10/26/23 2126   BP:    143/95   BP Location:       Pulse: 62 63 66 57   Resp:    15   Temp:       TempSrc:       SpO2: 91% 92% 94% 92%   Weight: 111 kg (245 lb)      Height: 188 cm (74\")          Active LDAs/IV Access:   Lines, Drains & Airways       Active LDAs       Name Placement date Placement time Site Days    Peripheral IV 10/26/23 2006 Left Antecubital 10/26/23  2006  Antecubital  less than 1    Peripheral IV 10/26/23 2012 Right Antecubital 10/26/23  2012  Antecubital  less than 1                    Labs (abnormal labs have a star):   Labs Reviewed   COMPREHENSIVE METABOLIC PANEL - Abnormal; Notable for the following components:       Result Value    Glucose 127 (*)     Creatinine 1.46 (*)     CO2 19.0 (*)     ALT (SGPT) 42 (*)     Anion Gap 19.0 (*)     eGFR 56.1 (*)     All other components within normal limits    Narrative:     GFR Normal >60  Chronic Kidney Disease <60  Kidney Failure <15     CBC WITH AUTO DIFFERENTIAL - Abnormal; " Notable for the following components:    Monocytes, Absolute 0.92 (*)     Eosinophils, Absolute 0.58 (*)     All other components within normal limits   PROTIME-INR - Normal   APTT - Normal   SINGLE HSTROPONIN T - Normal    Narrative:     High Sensitive Troponin T Reference Range:  <10.0 ng/L- Negative Female for AMI  <15.0 ng/L- Negative Male for AMI  >=10 - Abnormal Female indicating possible myocardial injury.  >=15 - Abnormal Male indicating possible myocardial injury.   Clinicians would have to utilize clinical acumen, EKG, Troponin, and serial changes to determine if it is an Acute Myocardial Infarction or myocardial injury due to an underlying chronic condition.        RAINBOW DRAW    Narrative:     The following orders were created for panel order [place] Draw.  Procedure                               Abnormality         Status                     ---------                               -----------         ------                     Green Top (Gel)[[id number]]                                  Final result               Lavender Top[[id number]]                                     Final result               Gold Top - SST[[id number]]                                   Final result               Light Blue Top[[id number]]                                   Final result                 Please view results for these tests on the individual orders.   RAINBOW DRAW    Narrative:     The following orders were created for panel order [place] Draw.  Procedure                               Abnormality         Status                     ---------                               -----------         ------                     Green Top (Gel)[[id number]]                                                             Lavender Top[[id number]]                                                                Gold Top - SST[[id number]]                                                              Light Blue Top[[id number]]                                                                 Please view results for these tests on the individual orders.   POCT GLUCOSE FINGERSTICK   TYPE AND SCREEN   GREEN TOP   LAVENDER TOP   GOLD TOP - SST   LIGHT BLUE TOP   CBC AND DIFFERENTIAL    Narrative:     The following orders were created for panel order CBC & Differential.  Procedure                               Abnormality         Status                     ---------                               -----------         ------                     CBC Auto Differential[[id number]]        Abnormal            Final result                 Please view results for these tests on the individual orders.   GREEN TOP   LAVENDER TOP   GOLD TOP - SST   LIGHT BLUE TOP       EKG:   ECG 12 Lead Altered Mental Status   Final Result   HEART RATE= 68  bpm   RR Interval= 882  ms   [place] Interval= 172  ms   P Horizontal Axis= -42  deg   P Front Axis= 42  deg   QRSD Interval= 112  ms   QT Interval= 431  ms   QTcB= 459  ms   QRS Axis= 42  deg   T Wave Axis= 34  deg   - OTHERWISE NORMAL ECG -   Sinus rhythm   Borderline intraventricular conduction delay   When compared with ECG of 23-Oct-2023 19:35:15,   No significant change   Electronically Signed By: Albert Ortiz ([facility]) 26-Oct-2023 21:35:01   Date and Time of Study: 2023-10-26 20:15:07          Meds given in ED:   Medications   sodium chloride 0.9 % flush 10 mL (has no administration in time range)   sodium chloride 0.9 % flush 10 mL (has no administration in time range)   sodium chloride 0.9 % flush 10 mL (has no administration in time range)   sodium chloride 0.9 % infusion 40 mL (has no administration in time range)   sennosides-docusate (PERICOLACE) 8.6-50 MG per tablet 2 tablet (has no administration in time range)     And   polyethylene glycol (MIRALAX) packet 17 g (has no administration in time range)     And   bisacodyl (DULCOLAX) EC tablet 5 mg (has no administration in time range)     And   bisacodyl (DULCOLAX) suppository 10 mg (has no  administration in time range)       Imaging results:  CT Head Without Contrast Stroke Protocol    Result Date: 10/26/2023  Negative head CT.  Radiation dose reduction techniques were utilized, including automated exposure control and exposure modulation based on body size.   This report was finalized on 10/26/2023 9:22 PM by Dr. Pk Oropeza M.D on Workstation: [employer]      XR Chest 1 View    Result Date: 10/26/2023  Negative.  This report was finalized on 10/26/2023 9:11 PM by Dr. Pk Oropeza M.D on Workstation: [employer]       Ambulatory status:   - with assist    Social issues:   Social History     Socioeconomic History    Marital status: Single   Tobacco Use    Smoking status: Never    Smokeless tobacco: Never   Vaping Use    Vaping Use: Never used   Substance and Sexual Activity    Alcohol use: Yes     Alcohol/week: 20.0 standard drinks of alcohol     Types: 10 Cans of beer, 10 Shots of liquor per week     Comment: pt states very rarely    Drug use: Yes     Frequency: 1.0 times per week     Types: Marijuana     Comment: Pt states he may have smoked marijuana 3 weeks ago (stated on 11-07-18)    Sexual activity: Not Currently     Partners: Female       NIH Stroke Scale:       Loreto Castorena RN  10/26/23 21:37 EDT

## 2023-10-27 NOTE — ED PROVIDER NOTES
EMERGENCY DEPARTMENT ENCOUNTER  Room Number:  11/11  Date of encounter:  10/26/2023  PCP: Akash Rodriguez Jr., DO  Patient Care Team:  Akash Rodriguez Jr.,  as PCP - General (Sports Medicine)     HPI:  Context: Flako Coreas is a 58 y.o. male who presents to the ED c/o chief complaint of dizziness and fall with closed head injury.  History supplied by patient by EMS.  Patient reports that he was getting out of the bathtub, felt dizzy.  Patient reports that he felt lightheaded, endorsed room spinning sensation, difficulty with balance difficulty with coordination and double vision.  Patient reports that he has been having the symptoms since his prior stroke but all of symptoms worsen.  Patient reports that they are slowly improving.  Patient reports that he fell and hit his head, denies any loss consciousness, does complain of mild dull frontal headache.  Patient endorses generalized weakness, denies any focal weakness, no facial droop, no difficulty speech, no numbness tingling.  Patient reports after falling he vomited, was not having vomiting before hand.  Patient is on dual antiplatelet therapy for recent CVA.    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 10/23/2023    Acute CVA (cerebrovascular accident) 10/23/2023    HTN (hypertension) 10/24/2023    Late effect of cerebrovascular  accident (CVA) 10/25/2023     Resolved Ambulatory Problems     Diagnosis Date Noted    Essential hypertension 06/26/2017    Abdominal pain 06/20/2018    Generalized abdominal pain 12/08/2022    Gastrointestinal hemorrhage 12/08/2022    Acute CVA (cerebrovascular accident) 08/17/2023     Past Medical History:   Diagnosis Date    ADHD (attention deficit hyperactivity disorder)     Anxiety     Lepe's esophagus     Benign prostatic hyperplasia Surgery in 12/2021    Clotting disorder Intestinal    Depression     Diverticulitis     Duodenitis     Enteritis     Failure to thrive (child)     Family history of blood clots     Hyperlipidemia     Hypertension     Low testosterone     Microcytosis     Pancreatitis     Pneumonia     PONV (postoperative nausea and vomiting)     Seasonal affective disorder     Shoulder injury     Stroke     Unexplained weight loss        PAST SURGICAL HISTORY  Past Surgical History:   Procedure Laterality Date    COLON SURGERY      COLONOSCOPY      COLONOSCOPY N/A 12/11/2022    Procedure: COLONOSCOPY INTO CECUM WITH HOT SNARE POLYPECTOMY;  Surgeon: Albert Gallo MD;  Location: [facility] ENDOSCOPY;  Service: Gastroenterology;  Laterality: N/A;  PRE- GI BLEED  POST- DIVERTICULOSIS, POLYP    ENDOSCOPY N/A 12/10/2022    Procedure: ESOPHAGOGASTRODUODENOSCOPY;  Surgeon: Albert Gallo MD;  Location: [facility] ENDOSCOPY;  Service: Gastroenterology;  Laterality: N/A;  PRE- DARK STOOLS  POST- BARRETTS ESOPHAGUS    FRACTURE SURGERY  1980    for broken arm    FRACTURE SURGERY      for broken hand    INCISION AND DRAINAGE ABSCESS  2015    anal    PROSTATE SURGERY      SMALL INTESTINE SURGERY      TONSILLECTOMY         FAMILY HISTORY  Family History   Problem Relation Age of Onset    Stroke Mother     Stroke Father        SOCIAL HISTORY  Social History     Socioeconomic History    Marital status: Single   Tobacco Use    Smoking status: Never    Smokeless tobacco: Never   Vaping Use    Vaping Use: Never used    Substance and Sexual Activity    Alcohol use: Yes     Alcohol/week: 20.0 standard drinks of alcohol     Types: 10 Cans of beer, 10 Shots of liquor per week     Comment: pt states very rarely    Drug use: Yes     Frequency: 1.0 times per week     Types: Marijuana     Comment: Pt states he may have smoked marijuana 3 weeks ago (stated on 11-07-18)    Sexual activity: Not Currently     Partners: Female       ALLERGIES  Patient has no known allergies.    The patient's allergies have been reviewed    REVIEW OF SYSTEMS  All systems reviewed and negative except for those discussed in HPI.     PHYSICAL EXAM  I have reviewed the triage vital signs and nursing notes.  ED Triage Vitals [10/26/23 2010]   Temp Heart Rate Resp BP SpO2   97.7 °F (36.5 °C) 92 18 121/76 92 %      Temp src Heart Rate Source Patient Position BP Location FiO2 (%)   Oral Monitor -- Right arm --       General: No acute distress.  HENT: Pinpoint laceration on forehead, PERRL, Nares patent.  Eyes: no scleral icterus.  Neck: trachea midline, no ROM limitations.  CV: regular rhythm, regular rate.  Respiratory: normal effort, CTAB.  Abdomen: soft, nondistended, NTTP, no rebound tenderness, no guarding or rigidity.  Musculoskeletal: no deformity.  Neuro: Alert and oriented x3, face symmetric, no facial droop, eyebrows raise equally bilaterally, tongue midline, no dysarthria, no aphasia, extraocular motion intact, no nystagmus, visual acuity grossly normal, cranial nerves II through XII intact, moves all extremities well, 5 out of 5 strength in all extremities, sensation intact light touch all extremities, + ataxia, no tremor.  Skin: warm, dry.    NIHSS:  0-->Alert: keenly responsive  0-->Answers both questions correctly  0-->Performs both tasks correctly  0=normal  0=No visual loss  0=Normal symmetric movement  0-->No drift: limb holds 90 (or 45) degrees for full 10 secs  0-->No drift: limb holds 90 (or 45) degrees for full 10 secs  0-->No drift: limb holds  90 (or 45) degrees for full 10 secs  0-->No drift: limb holds 90 (or 45) degrees for full 10 secs  2=Present in two limbs  0=Normal; no sensory loss  0=No aphasia, normal  0=Normal  0=No abnormality  Total score: 2      LAB RESULTS  Recent Results (from the past 24 hour(s))   Green Top (Gel)    Collection Time: 10/26/23  8:14 PM   Result Value Ref Range    Extra Tube Hold for add-ons.    Lavender Top    Collection Time: 10/26/23  8:14 PM   Result Value Ref Range    Extra Tube hold for add-on    Gold Top - SST    Collection Time: 10/26/23  8:14 PM   Result Value Ref Range    Extra Tube Hold for add-ons.    Light Blue Top    Collection Time: 10/26/23  8:14 PM   Result Value Ref Range    Extra Tube Hold for add-ons.    Comprehensive Metabolic Panel    Collection Time: 10/26/23  8:14 PM    Specimen: Blood   Result Value Ref Range    Glucose 127 (H) 65 - 99 mg/dL    BUN 20 6 - 20 mg/dL    Creatinine 1.46 (H) 0.76 - 1.27 mg/dL    Sodium 140 136 - 145 mmol/L    Potassium 3.7 3.5 - 5.2 mmol/L    Chloride 102 98 - 107 mmol/L    CO2 19.0 (L) 22.0 - 29.0 mmol/L    Calcium 9.7 8.6 - 10.5 mg/dL    Total Protein 7.6 6.0 - 8.5 g/dL    Albumin 4.9 3.5 - 5.2 g/dL    ALT (SGPT) 42 (H) 1 - 41 U/L    AST (SGOT) 24 1 - 40 U/L    Alkaline Phosphatase 73 39 - 117 U/L    Total Bilirubin 0.7 0.0 - 1.2 mg/dL    Globulin 2.7 gm/dL    A/G Ratio 1.8 g/dL    BUN/Creatinine Ratio 13.7 7.0 - 25.0    Anion Gap 19.0 (H) 5.0 - 15.0 mmol/L    eGFR 56.1 (L) >60.0 mL/min/1.73   Protime-INR    Collection Time: 10/26/23  8:14 PM    Specimen: Blood   Result Value Ref Range    Protime 13.2 11.7 - 14.2 Seconds    INR 0.99 0.90 - 1.10   aPTT    Collection Time: 10/26/23  8:14 PM    Specimen: Blood   Result Value Ref Range    PTT 28.2 22.7 - 35.4 seconds   Single High Sensitivity Troponin T    Collection Time: 10/26/23  8:14 PM    Specimen: Blood   Result Value Ref Range    HS Troponin T 10 <15 ng/L   ECG 12 Lead Altered Mental Status    Collection Time:  10/26/23  8:15 PM   Result Value Ref Range    QT Interval 431 ms    QTC Interval 459 ms   Type & Screen    Collection Time: 10/26/23  8:26 PM    Specimen: Blood   Result Value Ref Range    ABO Type A     RH type Positive     Antibody Screen Negative     T&S Expiration Date 10/29/2023 11:59:59 PM    CBC Auto Differential    Collection Time: 10/26/23  8:32 PM    Specimen: Blood   Result Value Ref Range    WBC 9.71 3.40 - 10.80 10*3/mm3    RBC 5.26 4.14 - 5.80 10*6/mm3    Hemoglobin 14.5 13.0 - 17.7 g/dL    Hematocrit 42.7 37.5 - 51.0 %    MCV 81.2 79.0 - 97.0 fL    MCH 27.6 26.6 - 33.0 pg    MCHC 34.0 31.5 - 35.7 g/dL    RDW 15.0 12.3 - 15.4 %    RDW-SD 42.9 37.0 - 54.0 fl    MPV 9.8 6.0 - 12.0 fL    Platelets 328 140 - 450 10*3/mm3    Neutrophil % 58.7 42.7 - 76.0 %    Lymphocyte % 25.0 19.6 - 45.3 %    Monocyte % 9.5 5.0 - 12.0 %    Eosinophil % 6.0 0.3 - 6.2 %    Basophil % 0.4 0.0 - 1.5 %    Immature Grans % 0.4 0.0 - 0.5 %    Neutrophils, Absolute 5.70 1.70 - 7.00 10*3/mm3    Lymphocytes, Absolute 2.43 0.70 - 3.10 10*3/mm3    Monocytes, Absolute 0.92 (H) 0.10 - 0.90 10*3/mm3    Eosinophils, Absolute 0.58 (H) 0.00 - 0.40 10*3/mm3    Basophils, Absolute 0.04 0.00 - 0.20 10*3/mm3    Immature Grans, Absolute 0.04 0.00 - 0.05 10*3/mm3    nRBC 0.0 0.0 - 0.2 /100 WBC       I ordered the above labs and reviewed the results.    RADIOLOGY  CT Head Without Contrast Stroke Protocol    Result Date: 10/26/2023  HEAD CT  HISTORY: Fall with a laceration to the forehead.  TECHNIQUE: Noncontrast head CT is provided. Comparison: Head CT August 21, 2023.  FINDINGS: The ventricles are normal in caliber. The brain parenchyma and extra-axial spaces appear normal. The recently demonstrated subacute left cerebellar infarction seen on MRI is not evident on CT. There is no hemorrhage or acute ischemic change. The bones of the skull appear normal. The mastoid air cells, middle ears, and visualized paranasal sinuses are clear.  There is wet  bandage material over the anterior frontal scalp compatible with the reported history of laceration. No soft tissue gas or radiopaque foreign body is present, however.      Negative head CT.  Radiation dose reduction techniques were utilized, including automated exposure control and exposure modulation based on body size.   This report was finalized on 10/26/2023 9:22 PM by Dr. Pk Oropeza M.D on Workstation: [id number]      XR Chest 1 View    Result Date: 10/26/2023  PORTABLE CHEST X-RAY  HISTORY: Dizziness, acute stroke protocol.  Portable chest x-ray is provided. Correlation: October 23, 2023.  FINDINGS: The cardiomediastinal silhouette is normal. The lungs are clear. The costophrenic sulci are dry and the bones appear normal. There is no pneumothorax.      Negative.  This report was finalized on 10/26/2023 9:11 PM by Dr. Pk Oropeza M.D on Workstation: [id number]       I ordered the above noted radiological studies. I reviewed the images and results. I agree with the radiologist interpretation.    PROCEDURES  Procedures    MEDICATIONS GIVEN IN ER  Medications   sodium chloride 0.9 % flush 10 mL (has no administration in time range)   sodium chloride 0.9 % flush 10 mL (has no administration in time range)   sodium chloride 0.9 % flush 10 mL (has no administration in time range)   sodium chloride 0.9 % infusion 40 mL (has no administration in time range)   sennosides-docusate (PERICOLACE) 8.6-50 MG per tablet 2 tablet (has no administration in time range)     And   polyethylene glycol (MIRALAX) packet 17 g (has no administration in time range)     And   bisacodyl (DULCOLAX) EC tablet 5 mg (has no administration in time range)     And   bisacodyl (DULCOLAX) suppository 10 mg (has no administration in time range)       PROGRESS, DATA ANALYSIS, CONSULTS, AND MEDICAL DECISION MAKING  A complete history and physical exam have been performed.  All available laboratory and imaging results have been reviewed by  myself prior to disposition.    MDM    After the initial H&P, I discussed pertinent information from history and physical exam with patient/family.  Discussed differential diagnosis.  Discussed plan for ED evaluation/workup/treatment.  All questions answered.  Patient/family is agreeable with plan.  ED Course as of 10/26/23 2148   Thu Oct 26, 2023   2013 Medical history reviewed and significant for: Patient was just admitted to the hospital from October 23 to 25, found to have acute/subacute left cerebellar infarct and chronic right thalamic infarct, admitted to the hospital, offered TNK but declined.  Patient was Plavix loaded but was found to be a nonresponder, switch to Brilinta at discharge.  Patient to continue dual antiplatelet therapy for 90 days. [JG]   2013 Patient presenting with dizziness, immediately went to evaluate patient.  Patient is not a tPA candidate given recent stroke. [JG]   2013 Patient complains of worsened posterior symptoms, patient is not a TNK candidate, no signs of large vessel occlusion, not interventional candidate.  Consulting stroke neurology. [JG]   2021 Phone call with Dr. Casillas, stroke neurology.  Discussed the patient, relevant history, exam, diagnostics, ED findings/progress, and concerns.  He agrees that patient is not a TNK or interventional candidate, agrees with plan for stroke work-up including plain CT head imaging and admission to hospital for MRI imaging and further evaluation.  They agree to consult.    [JG]   2024 EKG independently viewed and contemporaneously interpreted by ED physician. Time: 2015.  Rate 68.  Interpretation: Normal sinus rhythm, normal axis, nonspecific intraventricular conduction delay, no acute ST changes. [JG]   2119 I reviewed CT head imaging in PACS, no intracranial hemorrhage per my read. [JG]   2142 Phone call with Lakesha, DON for [facility].  Discussed the patient, relevant history, exam, diagnostics, ED findings/progress, and concerns. They agree  to admit the patient to telemetry observation under Dr. Gordon. Care assumed by the admitting physician at this time.     [JG]   2144 I reviewed chest x-ray on PACS, no pulmonary infiltrates per my read. [JG]   2145 Patient reassessed.  Discussed ED findings, differential diagnosis, and the need for admission for evaluation/treatment.  They are agreeable to admission and all questions were answered.     [JG]      ED Course User Index  [JG] Enrique Flores MD       AS OF 21:48 EDT VITALS:    BP - 143/95  HR - 57  TEMP - 97.7 °F (36.5 °C) (Oral)  O2 SATS - 92%    DIAGNOSIS  Final diagnoses:   Vertigo   Closed head injury, initial encounter   Laceration of forehead, initial encounter   Elevated serum creatinine   Hyperglycemia     Critical care:  Total critical care time of 32 minutes is exclusive of any other billable procedures and includes time spent with direct patient care and observation, retrospective chart review, management of acute condition, and consultation with other physicians.      DISPOSITION  ADMISSION    Discussed treatment plan and reason for admission with pt/family and admitting physician.  Pt/family voiced understanding of the plan for admission for further testing/treatment as needed.        Enrique Flores MD  10/26/23 2141

## 2023-10-27 NOTE — PROGRESS NOTES
ED OBSERVATION PROGRESS/DISCHARGE SUMMARY    Date of Admission: 10/26/2023   LOS: 0 days   PCP: Akash Rodriguez Jr., DO      Subjective:  Patient still reports a mild headache.  He reports very unsteady when getting up and continued dizziness.  He reports chronic left facial numbness and tingling.  Denies chest pain or shortness of breath.  Denies abdominal pain and nausea.    Hospital Outcome:   58-year-old male with a history of recent left cerebellar stroke with residual dizziness and left facial numbness who was admitted to the observation unit for further evaluation  of dizziness and a fall at home. In the ER, CT head without was negative for acute findings.    MRI of the brain negative for new stroke, notes chronic lacunar left cerebellum infarct that appears less prominent than previous study.  Neurology saw and evaluated the patient.  Symptoms of headache and continued dizziness exacerbated by head injury/concussion secondary to his vertigo is residual from his left cerebellar stroke.  Patient with significant ataxia on exam.  PT and OT saw and evaluated the patient recommending acute rehab at discharge.  Case management is following however placement is not expected until Monday.    Patient reported to staff member that he has been drinking alcohol the past couple weeks.  On further discussion, he reports drinking 6 drinks, liquor, daily for the past 2 to 3 weeks.  Alcohol withdrawal protocol has been initiated.    I will admit the patient to the internal medicine service.  Patient is in agreement with the plan.  I spoke with Dr. Cooper with [facility] who graciously accepts the patient under her service.      ROS:  General: no fevers, chills  Respiratory: no cough, dyspnea  Cardiovascular: no chest pain, palpitations  Abdomen: No abdominal pain, nausea, vomiting, or diarrhea  Neurologic: No focal weakness    Objective   Physical Exam:  I have reviewed the vital signs.  Temp:  [97.3 °F (36.3 °C)-97.9 °F  (36.6 °C)] 97.7 °F (36.5 °C)  Heart Rate:  [53-92] 65  Resp:  [15-18] 16  BP: (110-162)/() 110/69  General Appearance:  58-year-old male in no acute distress on room air  Head:    Normocephalic, atraumatic  Eyes:    Sclerae anicteric, no nystagmus  Neck:   Supple, no mass  Lungs: Clear to auscultation bilaterally, respirations unlabored  Heart: Regular rate and rhythm, S1 and S2 normal, no murmur, rub or gallop  Abdomen:  Soft, non-tender, bowel sounds active, nondistended  Extremities: No clubbing, cyanosis, or edema to lower extremities  Pulses:  2+ and symmetric in distal lower extremities  Skin: No rashes   Neurologic: Oriented x3, Normal strength to extremities, no facial droop, speech clear, significant ataxic gait    Results Review:    I have reviewed the labs, radiology results and diagnostic studies.    Results from last 7 days   Lab Units 10/26/23  2032   WBC 10*3/mm3 9.71   HEMOGLOBIN g/dL 14.5   HEMATOCRIT % 42.7   PLATELETS 10*3/mm3 328     Results from last 7 days   Lab Units 10/27/23  0421 10/26/23  2014 10/23/23  1909   SODIUM mmol/L 144 140 143   POTASSIUM mmol/L 3.8 3.7 3.6   CHLORIDE mmol/L 109* 102 105   CO2 mmol/L 24.2 19.0* 29.0   BUN mg/dL 21* 20 20   CREATININE mg/dL 1.06 1.46* 1.19   CALCIUM mg/dL 8.7 9.7 9.3   BILIRUBIN mg/dL  --  0.7 0.3   ALK PHOS U/L  --  73 68   ALT (SGPT) U/L  --  42* 29   AST (SGOT) U/L  --  24 23   GLUCOSE mg/dL 95 127* 131*     Imaging Results (Last 24 Hours)       Procedure Component Value Units Date/Time    MRI Brain Without Contrast [[id number]] Collected: 10/27/23 0058     Updated: 10/27/23 0058    Narrative:        Patient: NIMESH ROCHA  Time Out: 00:57  Exam(s): MRI HEAD Without Contrast     EXAM:    MR Head Without Intravenous Contrast    CLINICAL HISTORY:     Reason for exam: Dizziness and fall and recently had a acute infarct.    TECHNIQUE:    Magnetic resonance images of the head brain without intravenous   contrast in multiple  planes.    COMPARISON:    MRI brain from October 23, 2023 as well as CT angiogram and CT   perfusion from that date.  Prior MRI brain from September 2, 2023.    FINDINGS:    Brain:  5 mm focus of T2 shine through on the DWI images in the left   cerebellum consistent with old lacunar infarct is less prominent than on   the comparison study.  No acute infarcts are identified.  Mild patchy   periventricular and deep matter T2 hyperintensities consistent with   chronic small vessel disease.  There is no mass lesion or midline shift.    No hemorrhage.    Ventricles:  Unremarkable.  No ventriculomegaly.    Bones joints:  Unremarkable.    Sinuses:  Trace amount of mucosal thickening in the left maxillary   sinus and ethmoid air cells.  No gas fluid levels are seen.    Mastoid air cells:  Unremarkable as visualized.  No mastoid effusion.    Orbits:  Unremarkable as visualized.    IMPRESSION:         5 mm focus of T2 shine through on the DWI images in the left cerebellum   consistent with old lacunar infarct is less prominent than on the   comparison study.  No acute infarcts are identified.  Mild scattered T2   hyperintensities consistent with chronic small vessel disease, unchanged.      Impression:          Electronically signed by Antonio More MD on 10-27-23 at 0057    CT Head Without Contrast Stroke Protocol [[id number]] Collected: 10/26/23 2119     Updated: 10/26/23 2125    Narrative:      HEAD CT     HISTORY: Fall with a laceration to the forehead.     TECHNIQUE: Noncontrast head CT is provided. Comparison: Head CT August 21, 2023.     FINDINGS: The ventricles are normal in caliber. The brain parenchyma and  extra-axial spaces appear normal. The recently demonstrated subacute  left cerebellar infarction seen on MRI is not evident on CT. There is no  hemorrhage or acute ischemic change. The bones of the skull appear  normal. The mastoid air cells, middle ears, and visualized paranasal  sinuses are clear.     There is  wet bandage material over the anterior frontal scalp compatible  with the reported history of laceration. No soft tissue gas or  radiopaque foreign body is present, however.       Impression:      Negative head CT.     Radiation dose reduction techniques were utilized, including automated  exposure control and exposure modulation based on body size.        This report was finalized on 10/26/2023 9:22 PM by Dr. Pk Oropeza M.D on Workstation: [id number]       XR Chest 1 View [[id number]] Collected: 10/26/23 2110     Updated: 10/26/23 2114    Narrative:      PORTABLE CHEST X-RAY     HISTORY: Dizziness, acute stroke protocol.     Portable chest x-ray is provided. Correlation: October 23, 2023.     FINDINGS: The cardiomediastinal silhouette is normal. The lungs are  clear. The costophrenic sulci are dry and the bones appear normal. There  is no pneumothorax.       Impression:      Negative.     This report was finalized on 10/26/2023 9:11 PM by Dr. Pk Oropeza M.D on Workstation: [id number]               I have reviewed the medications.  ---------------------------------------------------------------------------------------------  Assessment & Plan   Assessment/Problem List    Concussion    Fall      Plan:    Head injury, concussion  Recent left cerebral stroke  -CT head without negative acute  -MRI brain without shows no evidence of new infarction  -Neurology consulted, recommended PT OT evaluation for discharge placement  -Continue aspirin, statin and Brilinta  -Neuro check every 4 hours  -Cardiac monitoring  -PT/OT saw and evaluated the patient, recommending acute rehab at discharge  -Case management on board for placement     ALLISON  -Renin 1.46, improved 1.06  -Avoid nephrotoxins  -We will discontinue further IV fluids at this time  -Trend with a.m. labs     Hypertension  -Continue amlodipine, carvedilol, hydralazine  -Hold lisinopril, hydrochlorothiazide and spironolactone secondary to ALLISON  -Vital signs every  4 hours    Alcohol abuse  -Withdrawal protocol initiated  -Thiamine, multivitamin and folic acid ordered  -As needed Ativan  -Seizure precautions    Disposition: Admit to [facility], Dr. Cooper    This note will serve as a transfer summary.       59 minutes have been spent by [facility] providers in the care of this patient while under observation status.    Appropriate PPE worn during patient encounter.  Hand hygeine performed before and after seeing the patient.      Mary Mccray PA-C 10/27/23 16:45 EDT

## 2023-10-27 NOTE — H&P
. [facility]   HISTORY AND PHYSICAL    Patient Name: Flako Coreas  : 1965  MRN: [id number]  Primary Care Physician:  Akash Rodriguez Jr.,   Date of admission: 10/26/2023    Subjective   Subjective     Chief Complaint: Dizziness    HPI:    Flako Coreas is a 58 y.o. male with past medical history including but not limited to hypertension, hyperlipidemia, GERD and recent CVA presents to [facility] with dizziness and a fall.  Patient reports that he was getting out of the bathtub, felt dizzy that he described as a spinning sensation.  Also, patient report associated balance difficulty with coordination and diplopia.  States he fell and hit his head on the floor.  Denies losing consciousness or being anticoagulated.  Reports that he had an episode of emesis and was diaphoretic.  Patient complains of dull frontal headache.  States that he has been having symptoms since his recent stroke back in August.  Reports left sided facial and left hand numbness.  States that he is having difficulty with short-term memory.  Denies facial droop, dysarthria or aphasia.  Denies difficulty swallowing.  Denies chest pain, dyspnea or palpitation.  Denies abdominal pain or diarrhea.  Denies cough, fever, chills, lower extremity edema.    Per chart review patient was admitted to the hospital from  -  and at that time was found to be hypertensive and had an acute CVA and was placed on Plavix and discharged home.  Then again patient presented to the ED on 10/23/2023 complaining of left-sided facial numbness as well as some left hand numbness and was admitted to the hospital.  Patient had a CTA of his head and neck that showed no evidence of hemorrhagic conversion, there is delayed flow to the inferior medial left cerebral hemisphere in the region of the infarction without area of diminished cerebral blood flow.  These findings are similar to the one on 2023.The left post PICA  vertebral artery is very small. 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 Moderate narrowing origin of the right vertebral artery.    Laboratory evaluation in the ED shows creatinine 1.46, glucose 127, ALT 42, WBC 9.7, hemoglobin 14.5 and platelets 328.  CT head without negative acute.    Review of Systems   All systems were reviewed and negative except for: That mentioned above in HPI    Personal History     Past Medical History:   Diagnosis Date    ADHD (attention deficit hyperactivity disorder)     On going issue    Anxiety     Lepe's esophagus     Benign prostatic hyperplasia Surgery in 12/2021    Clotting disorder Intestinal    Depression     Diverticulitis     Diverticulosis     Duodenitis     Enteritis     Failure to thrive (child)     Family history of blood clots     GERD (gastroesophageal reflux disease)     Hyperlipidemia     Hypertension     Hypertensive emergency     Low testosterone     Microcytosis     Pancreatitis     Pneumonia     PONV (postoperative nausea and vomiting)     Seasonal affective disorder     Shoulder injury     Stroke     Unexplained weight loss        Past Surgical History:   Procedure Laterality Date    COLON SURGERY      COLONOSCOPY      COLONOSCOPY N/A 12/11/2022    Procedure: COLONOSCOPY INTO CECUM WITH HOT SNARE POLYPECTOMY;  Surgeon: Albert Gallo MD;  Location: [facility] ENDOSCOPY;  Service: Gastroenterology;  Laterality: N/A;  PRE- GI BLEED  POST- DIVERTICULOSIS, POLYP    ENDOSCOPY N/A 12/10/2022    Procedure: ESOPHAGOGASTRODUODENOSCOPY;  Surgeon: Albert Gallo MD;  Location: [facility] ENDOSCOPY;  Service: Gastroenterology;  Laterality: N/A;  PRE- DARK STOOLS  POST- BARRETTS ESOPHAGUS    FRACTURE SURGERY  1980    for broken arm    FRACTURE SURGERY      for broken hand    INCISION AND DRAINAGE ABSCESS  2015    anal    PROSTATE SURGERY      SMALL INTESTINE SURGERY       TONSILLECTOMY         Family History: family history includes Stroke in his father and mother. Otherwise pertinent FHx was reviewed and not pertinent to current issue.    Social History:  reports that he has never smoked. He has never used smokeless tobacco. He reports current alcohol use of about 20.0 standard drinks of alcohol per week. He reports that he does not currently use drugs after having used the following drugs: Marijuana. Frequency: 1.00 time per week.    Home Medications:  amLODIPine, aspirin, atorvastatin, carvedilol, hydrALAZINE, hydrOXYzine, hydroCHLOROthiazide, lisinopril, meclizine, spironolactone, ticagrelor, and vilazodone    Allergies:  No Known Allergies    Objective   Objective     Vitals:   Temp:  [97.5 °F (36.4 °C)-97.9 °F (36.6 °C)] 97.5 °F (36.4 °C)  Heart Rate:  [53-92] 74  Resp:  [15-18] 16  BP: (121-162)/() 130/82  Physical Exam    Constitutional: Awake, alert   Eyes: PERRLA, sclerae anicteric, no conjunctival injection   HENT: NCAT, mucous membranes moist   Neck: Supple, no thyromegaly, no lymphadenopathy, trachea midline   Respiratory: Clear to auscultation bilaterally, nonlabored respirations    Cardiovascular: RRR, no murmurs, rubs, or gallops, palpable pedal pulses bilaterally   Gastrointestinal: Positive bowel sounds, soft, nontender, nondistended   Musculoskeletal: No bilateral ankle edema, no clubbing or cyanosis to extremities   Psychiatric: Appropriate affect, cooperative   Neurologic: Oriented x 3, strength symmetric in all extremities, Cranial Nerves grossly intact to confrontation, speech clear   Skin: No rashes     Result Review    Result Review:  I have personally reviewed the results from the time of this admission to 10/26/2023 23:42 EDT and agree with these findings:  []  Laboratory list / accordion  []  Microbiology  []  Radiology  []  EKG/Telemetry   []  Cardiology/Vascular   []  Pathology  []  Old records  []  Other:        Assessment & Plan   Assessment /  Plan     Brief Patient Summary:  Flako Coreas is a 58 y.o. male who was seen and evaluated in the ED for left-sided facial numbness     Active Hospital Problems:  Active Hospital Problems    Diagnosis     **TIA (transient ischemic attack)      Plan:   TIA  Recent left cerebral stroke  -CT head without negative acute  -MRI brain without pending  -Neurology consult  -Continue aspirin, statin and Brilinta  -Neuro check every 4 hours  -Cardiac monitoring    ALLISON  -Renin 1.46, baseline 1  -Avoid nephrotoxins  -IVF  -Trend with a.m. labs    Hypertension  -Continue amlodipine, carvedilol, hydralazine  -Hold lisinopril, hydrochlorothiazide and spironolactone secondary to ALLISON  -Vital signs every 4 hours      DVT prophylaxis:  Mechanical DVT prophylaxis orders are present.    CODE STATUS:    Level Of Support Discussed With: Patient  Code Status (Patient has no pulse and is not breathing): CPR (Attempt to Resuscitate)  Medical Interventions (Patient has pulse or is breathing): Full Support    Admission Status:  I believe this patient meets observation status.    70 minutes has been spent by [facility] providers in the care of this patient while under observation status    .During patient visit, I utilized appropriate personal protective equipment including gloves. Appropriate PPE was worn during the entire visit.  Hand hygiene was completed before and after    Electronically signed by JANEEN Jimenez, 10/26/23, 11:42 PM EDT.

## 2023-10-27 NOTE — CASE MANAGEMENT/SOCIAL WORK
Discharge Planning Assessment  [facility]     Patient Name: Flako Coreas  MRN: [id number]  Today's Date: 10/27/2023    Admit Date: 10/26/2023    Plan: Plans ?[facility] at d/c-ERIC Castillo RN   Discharge Needs Assessment       Row Name 10/27/23 1211       Living Environment    People in Home alone    Current Living Arrangements apartment    Potentially Unsafe Housing Conditions none    Primary Care Provided by self    Provides Primary Care For no one    Family Caregiver if Needed child(temo), adult    Family Caregiver Names Flako [phone]    Quality of Family Relationships supportive    Able to Return to Prior Arrangements --  To be evaluated by PT and OT, then proceed w/ referrals to [facility]       Resource/Environmental Concerns    Transportation Concerns no car       Transition Planning    Patient/Family Anticipates Transition to other (see comments)  [facility] rehab or home w/ HH    Transportation Anticipated health plan transportation       Discharge Needs Assessment    Equipment Currently Used at Home other (see comments)  walking stick at times    Concerns to be Addressed discharge planning    Anticipated Changes Related to Illness inability to care for self    Equipment Needed After Discharge other (see comments)  to be determined    Provided Post Acute Provider List? Yes    Post Acute Provider List --  [facility]    Delivered To Patient    Method of Delivery In person                   Discharge Plan       Row Name 10/27/23 7055       Plan    Plan Comments Spoke w/ Cate in OT and Pat in PT, who stated that evals will be done this afternoon-ERIC Castillo RN      Row Name 10/27/23 8545       Plan    Plan Plans ?[facility] at d/Edgardo Castillo RN    Patient/Family in Agreement with Plan yes    Provided Post Acute Provider List? Yes    Plan Comments Spoke w/ pt at bedside w/ his permission. Introduced self and explained role. All info on facesheet, including PCP as SIMRAN Rodriguez, verified. Lives alone in single level ap't w/  two flights of stairs to enter; has been able to navigate steps and around ap't w/o difficulty until present illness. Has been independent w/ ADLs until present illness. Uses walking cane at times. Uses B/P cuff at home. DME and community resources TBD. Awaiting PT and OT eval. Gave RTR; will follow up after evals. Pt stated he may agree to [facility], not [facility]. Agreed to provide facility ratings. Explained process. Uses [facility] on [address] per chart, already enrolled in Meds to Beds. States that if he is not able to go to [facility], he would not want HH but would agree to it, CM to follow-J. Jonathan SIDHU                  Continued Care and Services - Admitted Since 10/26/2023    Coordination has not been started for this encounter.       Selected Continued Care - Prior Encounters Includes continued care and service providers with selected services from prior encounters from 7/28/2023 to 10/27/2023      Discharged on 8/24/2023 Admission date: 8/19/2023 - Discharge disposition: Skilled Nursing Facility (DC - External)      Destination       Service Provider Selected Services Address Phone Fax Patient Preferred    [facility] [address] [place] [id number] [phone] --                             Demographic Summary       Row Name 10/27/23 1209       General Information    Admission Type observation    Arrived From emergency department    Referral Source admission list;physician    Reason for Consult discharge planning    Preferred Language English       Contact Information    Permission Granted to Share Info With                    Functional Status       Row Name 10/27/23 1205       Functional Status    Usual Activity Tolerance fair    Current Activity Tolerance poor       Functional Status, IADL    Medications independent    IADL Comments has been independent w/ tasks until current illness       Mental Status    General  Appearance WDL WDL       Mental Status Summary    Recent Changes in Mental Status/Cognitive Functioning no changes                   Psychosocial       Row Name 10/27/23 1210       Behavior WDL    Behavior WDL WDL       Emotion Mood WDL    Emotion/Mood/Affect WDL WDL       Speech WDL    Speech WDL WDL       Perceptual State WDL    Perceptual State WDL WDL       Thought Process WDL    Thought Process WDL WDL       Intellectual Performance WDL    Intellectual Performance WDL WDL    Level of Consciousness Alert                   Abuse/Neglect    No documentation.                  Legal    No documentation.                  Substance Abuse    No documentation.                  Patient Forms    No documentation.                     Henny Castillo RN

## 2023-10-27 NOTE — PLAN OF CARE
Goal Outcome Evaluation:  Plan of Care Reviewed With: patient        Progress: improving  Outcome Evaluation: TIA, vertigo and a fall.  Pt. is O x 4.  Pt. was SBA for safety with all bed mobility.  Pt. was CGA/Min (A) with sit to stand and standing balance.  AROM with (B)UE is WFL, Strenght with the (R)UE is 5/5, with the (L) is 4/5.  Pt. had decrease sharp/dull sensatio (B)ly with UEs.  Pt. has fine motor coordination defecit with the (L)UE.  Pt. also has fair + endurance overall.  Pt. was able to darwin/doff slipper socks with mod (I), increased time for safety.  Pt. would benefit for skilled OT to address corrdination & strength with the (L)UE and to increase overall endurance, to increase both (I) and safety with ADLs and IADLs.  OT wore appropriate PPE and washed her hads B/A the session.      Anticipated Discharge Disposition (OT): inpatient rehabilitation facility, sub acute care setting

## 2023-10-27 NOTE — PLAN OF CARE
"Goal Outcome Evaluation: patient came to obs unit for eval / tx of dizziness at home with fall out of bathtub and hitting head. Glue applied to forehead laceration , well approximated , no blood presently visible. Patient is being followed by neurology and patient has history of previous strokes , including most recent left cerebellum infarct on 10/23/23. Patient was admitted to [facility] and admittedly walked away from facility. [facility] These were his words. Patient was assessed by pt and ot during this visit and deemed not safe to go home alone. Nih this morning was a 5 for this nurse, 4 points accrued for residual for patient baseline and left hand /left arm ataxia which he states is new this hospital visit, added 1 point to nih. Patient is alert and oriented x4. [facility] approximately 1805.                        "

## 2023-10-27 NOTE — PLAN OF CARE
"Goal Outcome Evaluation:  Plan of Care Reviewed With: patient           Outcome Evaluation: Pt is 59 yo male seen in obs unit where he came in after fall at home, diagnosed with TIA, L cerebellar PICA, severe stenosis. PMH significant for hospitalization at this facility 10/23-10/26, and 8/16-8/24/23 for CVA. Pt reports he went to SNU after hospitalization in August, had a very negative experience and returned home without f/u therapy services. Pt reports he has outpt PT scheduled to begin next week. Patient lives alone, 2 flight of stairs to enter home, uses cane \"sometimes\", reports 4 falls in past week. Today, patient c/o fatigue but agreeable to therapy, performed bed mobility with SBA, sit to stand with min/CGA, ambulated in hallway with min/CGAx2 demonstrating unsteady gait, decreased coordination, decreased balance, decreased activity tolerance which limit safety and independence with functional mobility. Recommend d/c to SNU at current level of function.      Anticipated Discharge Disposition (PT): skilled nursing facility  "

## 2023-10-27 NOTE — NURSING NOTE
This nurse witnessed patient becoming more anxious exhibiting s/s of alcohol withdrawal. After quick discussion with patient of assessment needed, patient disclosed new information, previously withheld from health care professionals. Patient disclosed he has been drinking 7 days a week, approximately 9-10 drinks a day and smoking marijuana. Patient had previously denied any drinking or illicit drug use. At 1640, A new ciwa score of 20 was obtained and pa notified. This was prior to transferring patient to inpatient status. Ciwa protocol began and 2mg iv ativan given. After 20-25 minutes, repeat ciwa performed and score of 18 obtained at 1712. Repeat ciwa at 25 minutes and score of 2 was obtained. In the interim, new provider, dr miller was in unit to assess patient and face to face info given to this md about status update. Report called to mohan lópez on [address] for room 576P and transport request initiated. Patient does not want any information about this new information on alcohol consumption shared with any family, visitors, or friends visiting. This privacy was respected and shared this info during report to provider and rn.

## 2023-10-27 NOTE — PLAN OF CARE
Goal Outcome Evaluation:                      Pt admitted from ED for evaluation of dizziness and weakness. Pt did sustain a fall tonight before coming into the hospital. This was approximated with skin glue per the ED team. Pt states he is feeling a lot better than he did earlier tonight. Going for MRI.

## 2023-10-27 NOTE — THERAPY EVALUATION
Patient Name: Flako Coreas  : 1965    MRN: [id number]                              Today's Date: 10/27/2023       Admit Date: 10/26/2023    Visit Dx:     ICD-10-CM ICD-9-CM   1. Vertigo  R42 780.4   2. Closed head injury, initial encounter  S09.90XA 959.01   3. Laceration of forehead, initial encounter  S01.81XA 873.42   4. Elevated serum creatinine  R79.89 790.99   5. 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    Acute CVA (cerebrovascular accident)    HTN (hypertension)    Late effect of cerebrovascular accident (CVA)    TIA (transient ischemic attack)     Past Medical History:   Diagnosis Date    ADHD (attention deficit hyperactivity disorder)     On going issue    Anxiety     Lepe's esophagus     Benign prostatic hyperplasia Surgery in 2021    Clotting disorder Intestinal    Depression     Diverticulitis     Diverticulosis     Duodenitis     Enteritis     Failure to thrive (child)     Family history of blood clots     GERD (gastroesophageal reflux disease)     Hyperlipidemia     Hypertension     Hypertensive emergency     Low testosterone     Microcytosis     Pancreatitis     Pneumonia     PONV (postoperative nausea and vomiting)     Seasonal affective disorder     Shoulder injury     Stroke     Unexplained weight loss      Past Surgical History:   Procedure Laterality Date    COLON SURGERY      COLONOSCOPY      COLONOSCOPY N/A 2022    Procedure: COLONOSCOPY INTO CECUM WITH HOT SNARE POLYPECTOMY;  Surgeon: Albert Gallo MD;  Location: [facility] ENDOSCOPY;   "Service: Gastroenterology;  Laterality: N/A;  PRE- GI BLEED  POST- DIVERTICULOSIS, POLYP    ENDOSCOPY N/A 12/10/2022    Procedure: ESOPHAGOGASTRODUODENOSCOPY;  Surgeon: Albert Gallo MD;  Location: [facility] ENDOSCOPY;  Service: Gastroenterology;  Laterality: N/A;  PRE- DARK STOOLS  POST- BARRETTS ESOPHAGUS    FRACTURE SURGERY  1980    for broken arm    FRACTURE SURGERY      for broken hand    INCISION AND DRAINAGE ABSCESS  2015    anal    PROSTATE SURGERY      SMALL INTESTINE SURGERY      TONSILLECTOMY        General Information       Row Name 10/27/23 1520          OT Time and Intention    Document Type evaluation  -VS     Mode of Treatment individual therapy;occupational therapy  -VS       Row Name 10/27/23 1520          General Information    Patient Profile Reviewed yes  -VS     Prior Level of Function independent:  -VS     Existing Precautions/Restrictions fall  -VS     Barriers to Rehab medically complex  -VS       Row Name 10/27/23 1520          Living Environment    People in Home alone  Has kids that can (A) if when needed  -VS       Row Name 10/27/23 1520          Cognition    Orientation Status (Cognition) oriented x 4  -VS       Row Name 10/27/23 1520          Safety Issues, Functional Mobility    Comment, Safety Issues/Impairments (Mobility) Pt. stated at time he is \"very dizzy\" and this is what happen to him whihc wong him back to the hospital.  -VS               User Key  (r) = Recorded By, (t) = Taken By, (c) = Cosigned By      Initials Name Provider Type    VS Agatha Meraz OTR Occupational Therapist                     Mobility/ADL's       Row Name 10/27/23 1522          Bed Mobility    Bed Mobility bed mobility (all) activities  -VS     All Activities, [place] (Bed Mobility) standby assist  -VS     Comment, (Bed Mobility) SBA for safety  -VS       Row Name 10/27/23 1522          Transfers    Transfers sit-stand transfer;stand-sit transfer  -VS     Comment, (Transfers) For safety  -VS  " "     Row Name 10/27/23 1522          Sit-Stand Transfer    Sit-Stand Equality (Transfers) contact guard;minimum assist (75% patient effort)  -VS       Row Name 10/27/23 1522          Stand-Sit Transfer    Stand-Sit Equality (Transfers) minimum assist (75% patient effort);contact guard  -VS     Comment, (Stand-Sit Transfer) Hand held Assistance  -VS       Row Name 10/27/23 1522          Activities of Daily Living    BADL Assessment/Intervention lower body dressing  -VS       Row Name 10/27/23 1522          Lower Body Dressing Assessment/Training    Equality Level (Lower Body Dressing) lower body dressing skills;doff;don;socks;modified independence  increased time to complete, for safety  -VS     Position (Lower Body Dressing) edge of bed sitting  -VS               User Key  (r) = Recorded By, (t) = Taken By, (c) = Cosigned By      Initials Name Provider Type    VS Agatha Meraz OTR Occupational Therapist                   Obj/Interventions       Row Name 10/27/23 1523          Sensory Assessment (Somatosensory)    Sensory Assessment Pt. stated the (L) arm \"just feel different\"  Sharp/Dull was dcreased (B)ly ?  -VS       Row Name 10/27/23 1523          Vision Assessment/Intervention    Vision Assessment Comment No visual issues noted at this time, but pt. likes to be in a dark room.  -VS       Row Name 10/27/23 1523          Range of Motion Comprehensive    General Range of Motion bilateral upper extremity ROM WFL  -VS     Comment, General Range of Motion AROM WFL (B)ly  -VS       Row Name 10/27/23 1523          Strength Comprehensive (MMT)    Comment, General Manual Muscle Testing (MMT) Assessment (R)UE 5/5 MMT, (L)UE 4-/5 MMT  -VS       Row Name 10/27/23 1523          Motor Skills    Motor Skills functional endurance;coordination;motor control/coordination interventions  -VS     Coordination fine motor deficit;left;moderate impairment;minimal impairment  -VS     Functional Endurance Fair - overall " endurance, and decreaed coordination with fine motor task  -VS     Motor Control/Coordination Interventions fine motor manipulation/dexterity activities;gross motor coordination activities;therapeutic exercise/ROM  -VS       Row Name 10/27/23 1523          Balance    Balance Assessment standing static balance  -VS     Static Standing Balance contact guard;minimal assist  -VS     Balance Interventions occupation based/functional task  -VS               User Key  (r) = Recorded By, (t) = Taken By, (c) = Cosigned By      Initials Name Provider Type    VS Agatha Meraz OTR Occupational Therapist                   Goals/Plan       Row Name 10/27/23 1537          Transfer Goal 1 (OT)    Activity/Assistive Device (Transfer Goal 1, OT) transfers, all;sit-to-stand/stand-to-sit;toilet;bed-to-chair/chair-to-bed  -VS     [place] Level/Cues Needed (Transfer Goal 1, OT) modified independence  -VS     Time Frame (Transfer Goal 1, OT) 2 weeks;short term goal (STG)  -VS     Progress/Outcome (Transfer Goal 1, OT) continuing progress toward goal  -VS       Row Name 10/27/23 1537          Bathing Goal 1 (OT)    Activity/Device (Bathing Goal 1, OT) bathing skills, all;upper body bathing;lower body bathing  -VS     [place] Level/Cues Needed (Bathing Goal 1, OT) modified independence  -VS     Time Frame (Bathing Goal 1, OT) short term goal (STG);2 weeks  -VS       Row Name 10/27/23 1537          Dressing Goal 1 (OT)    Activity/Device (Dressing Goal 1, OT) dressing skills, all;upper body dressing;lower body dressing  -VS     [place]/Cues Needed (Dressing Goal 1, OT) modified independence  -VS     Time Frame (Dressing Goal 1, OT) short term goal (STG);2 weeks  -VS       Row Name 10/27/23 1537          Strength Goal 1 (OT)    Strength Goal 1 (OT) Pt. to have increased strenght and coordination with the (L)UE to (A) with ADLs and IADLs  -VS     Time Frame (Strength Goal 1, OT) short term goal (STG);2 weeks  -VS      Strategies/Barriers (Strength Goal 1, OT) Exercises were given to the pt. for both corrdination and strengthing of (L)UE  -VS     Progress/Outcome (Strength Goal 1, OT) continuing progress toward goal  -VS               User Key  (r) = Recorded By, (t) = Taken By, (c) = Cosigned By      Initials Name Provider Type    VS Agatha Meraz OTR Occupational Therapist                   Clinical Impression       Row Name 10/27/23 1527          Pain Assessment    Pretreatment Pain Rating 0/10 - no pain  -VS     Posttreatment Pain Rating 0/10 - no pain  -VS       Row Name 10/27/23 1530 10/27/23 1527       Plan of Care Review    Plan of Care Reviewed With -- patient  -VS    Progress -- improving  -VS    Outcome Evaluation TIA, vertigo and a fall.  Pt. is O x 4.  Pt. was SBA for safety with all bed mobility.  Pt. was CGA/Min (A) with sit to stand and standing balance.  AROM with (B)UE is WFL, Strenght with the (R)UE is 5/5, with the (L) is 4/5.  Pt. had decrease sharp/dull sensatio (B)ly with UEs.  Pt. has fine motor coordination defecit with the (L)UE.  Pt. also has fair + endurance overall.  Pt. was able to darwin/doff slipper socks with mod (I), increased time for safety.  Pt. would benefit for skilled OT to address corrdination & strength with the (L)UE and to increase overall endurance, to increase both (I) and safety with ADLs and IADLs.  OT wore appropriate PPE and washed her hads B/A the session.  -VS OT:  Pt. is a 58 year old male who was re-admitted to the hospital due to  -VS      Row Name 10/27/23 1530          Therapy Assessment/Plan (OT)    Patient/Family Therapy Goal Statement (OT) I would like to get stronger and not be as dizzy at time  -VS     Rehab Potential (OT) good, to achieve stated therapy goals  -VS     Criteria for Skilled Therapeutic Interventions Met (OT) yes;skilled treatment is necessary  -VS     Therapy Frequency (OT) 5 times/wk  -VS       Row Name 10/27/23 1530          Therapy Plan  Review/Discharge Plan (OT)    Anticipated Discharge Disposition (OT) inpatient rehabilitation facility;sub acute care setting  -VS       Row Name 10/27/23 1530          Positioning and Restraints    Pre-Treatment Position in bed  -VS     Post Treatment Position bed  -VS     In Bed supine;call light within reach;encouraged to call for assist;side rails up x3  -VS               User Key  (r) = Recorded By, (t) = Taken By, (c) = Cosigned By      Initials Name Provider Type    VS Agatha Meraz OTR Occupational Therapist                   Outcome Measures       Row Name 10/27/23 1539          How much help from another is currently needed...    Putting on and taking off regular lower body clothing? 3  -VS     Bathing (including washing, rinsing, and drying) 3  -VS     Toileting (which includes using toilet bed pan or urinal) 3  -VS     Putting on and taking off regular upper body clothing 3  -VS     Taking care of personal grooming (such as brushing teeth) 3  -VS     Eating meals 4  -VS     AM-PAC 6 Clicks Score (OT) 19  -VS       Row Name 10/27/23 0859          How much help from another person do you currently need...    Turning from your back to your side while in flat bed without using bedrails? 4  -SF     Moving from lying on back to sitting on the side of a flat bed without bedrails? 4  -SF     Moving to and from a bed to a chair (including a wheelchair)? 4  -SF     Standing up from a chair using your arms (e.g., wheelchair, bedside chair)? 4  -SF     Climbing 3-5 steps with a railing? 4  -SF     To walk in hospital room? 4  -SF     AM-PAC 6 Clicks Score (PT) 24  -SF     Highest level of mobility 8 --> Walked 250 feet or more  -SF       Row Name 10/27/23 1539          Functional Assessment    Outcome Measure Options AM-PAC 6 Clicks Daily Activity (OT)  -VS               User Key  (r) = Recorded By, (t) = Taken By, (c) = Cosigned By      Initials Name Provider Type    VS Agatha Meraz OTR Occupational  Therapist    Aubrie Whelan RN Registered Nurse                    Occupational Therapy Education       Title: PT OT SLP Therapies (Done)       Topic: Occupational Therapy (Done)       Point: ADL training (Done)       Description:   Instruct learner(s) on proper safety adaptation and remediation techniques during self care or transfers.   Instruct in proper use of assistive devices.                  Learning Progress Summary             Patient Acceptance, E,D,TB, VU by VS at 10/27/2023 1540    Comment: OT instructed the pt. is exercises whaich her can complete to work on both cordination and strengthening with the (L)UE                         Point: Home exercise program (Done)       Description:   Instruct learner(s) on appropriate technique for monitoring, assisting and/or progressing therapeutic exercises/activities.                  Learning Progress Summary             Patient Acceptance, E,D,TB, VU by VS at 10/27/2023 1540    Comment: OT instructed the pt. is exercises whaich her can complete to work on both cordination and strengthening with the (L)UE                                         User Key       Initials Effective Dates Name Provider Type Discipline    VS 06/16/21 -  Agatha Meraz OTR Occupational Therapist OT                  OT Recommendation and Plan  Therapy Frequency (OT): 5 times/wk  Plan of Care Review  Plan of Care Reviewed With: patient  Progress: improving  Outcome Evaluation: TIA, vertigo and a fall.  Pt. is O x 4.  Pt. was SBA for safety with all bed mobility.  Pt. was CGA/Min (A) with sit to stand and standing balance.  AROM with (B)UE is WFL, Strenght with the (R)UE is 5/5, with the (L) is 4/5.  Pt. had decrease sharp/dull sensatio (B)ly with UEs.  Pt. has fine motor coordination defecit with the (L)UE.  Pt. also has fair + endurance overall.  Pt. was able to darwin/doff slipper socks with mod (I), increased time for safety.  Pt. would benefit for skilled OT to address  corrdination & strength with the (L)UE and to increase overall endurance, to increase both (I) and safety with ADLs and IADLs.  OT wore appropriate PPE and washed her hads B/A the session.     Time Calculation:   Evaluation Complexity (OT)  Review Occupational Profile/Medical/Therapy History Complexity: brief/low complexity  Assessment, Occupational Performance/Identification of Deficit Complexity: 3-5 performance deficits  Clinical Decision Making Complexity (OT): problem focused assessment/low complexity  Overall Complexity of Evaluation (OT): low complexity     Time Calculation- OT       Row Name 10/27/23 1541             Time Calculation- OT    OT Start Time 1452  -VS      OT Stop Time 1515  -VS      OT Time Calculation (min) 23 min  -VS      OT Non-Billable Time (min) 12 min  -VS      OT Received On 10/27/23  -VS      OT - Next Appointment 10/30/23  -VS      OT Goal Re-Cert Due Date 11/10/23  -VS                User Key  (r) = Recorded By, (t) = Taken By, (c) = Cosigned By      Initials Name Provider Type    VS Agatha Meraz OTR Occupational Therapist                  Therapy Charges for Today       Code Description Service Date Service Provider Modifiers Qty    [id number]  OT EVAL LOW COMPLEXITY 2 10/27/2023 Agatha Meraz OTR GO 1    [id number]  OT NEUROMUSC RE EDUCATION EA 15 MIN 10/27/2023 Agatha Meraz OTR GO 1                 BEATA Tate  10/27/2023

## 2023-10-27 NOTE — CONSULTS
NEUROLOGY CONSULT - STROKE TEAM    DOS: 10/27/2023  NAME: Flako Coreas   : 1965  PCP: Akash Rodriguez Jr., DO  CC: Stroke  Referring MD: No ref. provider found  Patient being seen at request of Dr. Gordon for advice and opinion on fall.    Neurological Problem and Interval History:  58 y.o. male presents with chief complaint of: fall.  58m was home only 4 hours, was taking a bath and felt another attack of vertigo, fell and hit his forehead with abrasion and arm. Patient had been here for several days for a similar loss of balance and MRI showed no new infarct but perfusion showed a larger area of perfusion deficit.      Past Medical/Surgical Hx:  Past Medical History:   Diagnosis Date    ADHD (attention deficit hyperactivity disorder)     On going issue    Anxiety     Lepe's esophagus     Benign prostatic hyperplasia Surgery in 2021    Clotting disorder Intestinal    Depression     Diverticulitis     Diverticulosis     Duodenitis     Enteritis     Failure to thrive (child)     Family history of blood clots     GERD (gastroesophageal reflux disease)     Hyperlipidemia     Hypertension     Hypertensive emergency     Low testosterone     Microcytosis     Pancreatitis     Pneumonia     PONV (postoperative nausea and vomiting)     Seasonal affective disorder     Shoulder injury     Stroke     Unexplained weight loss      Past Surgical History:   Procedure Laterality Date    COLON SURGERY      COLONOSCOPY      COLONOSCOPY N/A 2022    Procedure: COLONOSCOPY INTO CECUM WITH HOT SNARE POLYPECTOMY;  Surgeon: Albert Gallo MD;  Location: [facility] ENDOSCOPY;  Service: Gastroenterology;  Laterality: N/A;  PRE- GI BLEED  POST- DIVERTICULOSIS, POLYP    ENDOSCOPY N/A 12/10/2022    Procedure: ESOPHAGOGASTRODUODENOSCOPY;  Surgeon: Albert Gallo MD;  Location: [facility] ENDOSCOPY;  Service: Gastroenterology;  Laterality: N/A;  PRE- DARK STOOLS  POST- BARRETTS ESOPHAGUS    FRACTURE SURGERY       for broken arm    FRACTURE SURGERY      for broken hand    INCISION AND DRAINAGE ABSCESS  2015    anal    PROSTATE SURGERY      SMALL INTESTINE SURGERY      TONSILLECTOMY         Review of Systems:        No fever, sweats or chills,  No neck pain, back pain or joint pain  No loss of hearing or tinnitus  No blurry or double vision  No rashes or edema  No chest pain or palpitations  No shortness of breath or wheezing  No diarrhea or constipation  No urinary incontinence or dysuria  No seizures or syncope  No depression or anxiety    Medications On Admission  Medications Prior to Admission   Medication Sig Dispense Refill Last Dose    amLODIPine (NORVASC) 10 MG tablet Take 0.5 tablets by mouth Daily.   10/26/2023    aspirin 81 MG EC tablet Take 1 tablet by mouth Daily. 90 tablet 1 10/26/2023    atorvastatin (LIPITOR) 80 MG tablet Take 1 tablet by mouth Every Night. 30 tablet 0 10/26/2023    carvedilol (COREG) 6.25 MG tablet Take 1 tablet by mouth 2 (Two) Times a Day With Meals. 180 tablet 1 10/26/2023    hydrALAZINE (APRESOLINE) 100 MG tablet Take 1 tablet by mouth Every 8 (Eight) Hours. 270 tablet 1 10/26/2023    hydroCHLOROthiazide (HYDRODIURIL) 12.5 MG tablet Take 1 tablet by mouth Daily. 30 tablet 1 10/26/2023    hydrOXYzine (ATARAX) 25 MG tablet Take 1 tablet by mouth 3 (Three) Times a Day As Needed for Anxiety. 90 tablet 0 10/26/2023    lisinopril (PRINIVIL,ZESTRIL) 40 MG tablet Take 1 tablet by mouth Daily. 90 tablet 1 10/26/2023    spironolactone (ALDACTONE) 25 MG tablet Take 1 tablet by mouth Daily. 90 tablet 1 10/26/2023    ticagrelor (BRILINTA) 60 MG tablet tablet Take 1 tablet by mouth 2 (Two) Times a Day for 30 days. 60 tablet 0 10/26/2023    vilazodone (Viibryd) 40 MG tablet tablet Take 1 tablet by mouth Daily. 90 tablet 1 10/26/2023    meclizine (ANTIVERT) 25 MG tablet           Allergies:  No Known Allergies    Social Hx:  Social History     Socioeconomic History    Marital status: Single   Tobacco Use     Smoking status: Never    Smokeless tobacco: Never   Vaping Use    Vaping Use: Never used   Substance and Sexual Activity    Alcohol use: Yes     Alcohol/week: 20.0 standard drinks of alcohol     Types: 10 Cans of beer, 10 Shots of liquor per week     Comment: pt states very rarely    Drug use: Not Currently     Frequency: 1.0 times per week     Types: Marijuana     Comment: Pt states he may have smoked marijuana 3 weeks ago (stated on 11-07-18)    Sexual activity: Not Currently     Partners: Female       Family Hx:  Family History   Problem Relation Age of Onset    Stroke Mother     Stroke Father        Review of Imaging (Interpretation of images not reports):      Laboratory Results:   Lab Results   Component Value Date    GLUCOSE 95 10/27/2023    CALCIUM 8.7 10/27/2023     10/27/2023    K 3.8 10/27/2023    CO2 24.2 10/27/2023     (H) 10/27/2023    BUN 21 (H) 10/27/2023    CREATININE 1.06 10/27/2023    EGFRIFAFRI >60 09/28/2018    EGFRIFNONA 98 11/05/2018    BCR 19.8 10/27/2023    ANIONGAP 10.8 10/27/2023     Lab Results   Component Value Date    WBC 9.71 10/26/2023    HGB 14.5 10/26/2023    HCT 42.7 10/26/2023    MCV 81.2 10/26/2023     10/26/2023     Lab Results   Component Value Date    CHOL 127 10/24/2023    CHOL 117 08/20/2023    CHOL 194 08/16/2023     Lab Results   Component Value Date    HDL 39 (L) 10/24/2023    HDL 34 (L) 08/20/2023    HDL 41 08/16/2023     Lab Results   Component Value Date    LDL 71 10/24/2023    LDL 62 08/20/2023     (H) 08/16/2023     Lab Results   Component Value Date    TRIG 88 10/24/2023    TRIG 115 08/20/2023    TRIG 194 (H) 08/16/2023     Lab Results   Component Value Date    HGBA1C 5.70 (H) 10/24/2023     Lab Results   Component Value Date    INR 0.99 10/26/2023    INR 0.93 10/23/2023    INR 1.0 08/21/2023    PROTIME 13.2 10/26/2023    PROTIME 12.6 10/23/2023    PROTIME 12.3 08/19/2023         Physical Examination:   /82 (BP Location: Right arm,  "Patient Position: Lying)   Pulse 60   Temp 97.3 °F (36.3 °C) (Oral)   Resp 16   Ht 188 cm (74\")   Wt 105 kg (231 lb)   SpO2 100%   BMI 29.66 kg/m²   General Appearance:   Well developed, well nourished, well groomed, alert, and cooperative.  HEENT: Normocephalic. Atraumatic. No JVD, no tracheal deviation.  Neck and Spine: Normal range of motion.  Normal alignment. No mass or tenderness. No bruits.  Cardiac: Regular rate and rhythm. No murmurs.  Pulmonary: No shortness of breath, clear anteriorly to auscultation  Abdomen:  Soft, nontender, nondistended   Peripheral Vasculature: Radial pulses are equal and symmetric. No signs of distal embolization.  Extremities:    No edema or deformities.   Skin:    No rashes or discoloration    Neurological examination:  Higher Integrative  Function: Oriented to time, place and person. Normal registration, recall, attention span and concentration. Normal language including comprehension, spontaneous speech, repetition, naming and vocabulary. No neglect. Normal fund of knowledge and higher integrative function.  CN II: Pupils are equal, round, and reactive to light. Blinks to visual threat and counts fingers in all fields  CN III IV VI: Extraocular movements are full without nystagmus.   CN V: Normal facial sensation   CN VII: Facial movements are symmetric. No labial dysarthria  CN VIII:   Auditory acuity is normal.  CN IX & X:   No palatal or pharnygeal dysarthria.  CN XI: Turns head without abnormality.   CN XII:   The tongue is midline.  No lingual dysarthria.   Motor: Normal muscle strength, bulk and tone in upper and lower extremities.  No fasciculations, rigidity, spasticity, or abnormal movements.  Reflexes: 2+ in the upper and lower extremities. Plantar responses are flexor.  Sensation: Normal to light touch, temperature, and proprioception in arms and legs.   Station and Gait: Deferred for bed rest    Coordination: Left arm dysmetria to finger to nose, ataxic hitch " and waver. Truncal ataxia.    NIHSS:     Diagnoses / Discussion:  58 y.o. who presents with Sx of left cerebellar PICA hypoperfusion with significant fall and likely mild concussion.   1) Head injury/concussion secondary to vertigo from left cerebellar left PICA severe stenosis without increase in infarct size. Patient does appear to have greater limb and truncal ataxia than prior examination. PT/OT evaluation for disposition. I suspect a short-term rehab stay would help patient recover his balance more safely than at home. MRI negative for new stroke; defer changing his prevention and continue current management.    Plan:  PT/OT for disposition; prefer a short term facility or rehab stay until his balance is back to baseline.       I have discussed the above with the patient   Time spent with patient: 40min    MDM  Reviewed: previous chart, nursing note and vitals  Reviewed previous: labs, MRI and CT scan  Interpretation: CT scan, MRI and labs  Total time providing critical care: 30-74 minutes. This excludes time spent performing separately reportable procedures and services.        Will sign off, feel free to recontact with any further questions or concerns.       Bladimir Bledsoe MD  Neurology

## 2023-10-27 NOTE — THERAPY EVALUATION
Patient Name: Flako Coreas  : 1965    MRN: [id number]                              Today's Date: 10/27/2023       Admit Date: 10/26/2023    Visit Dx:     ICD-10-CM ICD-9-CM   1. Vertigo  R42 780.4   2. Closed head injury, initial encounter  S09.90XA 959.01   3. Laceration of forehead, initial encounter  S01.81XA 873.42   4. Elevated serum creatinine  R79.89 790.99   5. 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    Acute CVA (cerebrovascular accident)    HTN (hypertension)    Late effect of cerebrovascular accident (CVA)    TIA (transient ischemic attack)     Past Medical History:   Diagnosis Date    ADHD (attention deficit hyperactivity disorder)     On going issue    Anxiety     Lepe's esophagus     Benign prostatic hyperplasia Surgery in 2021    Clotting disorder Intestinal    Depression     Diverticulitis     Diverticulosis     Duodenitis     Enteritis     Failure to thrive (child)     Family history of blood clots     GERD (gastroesophageal reflux disease)     Hyperlipidemia     Hypertension     Hypertensive emergency     Low testosterone     Microcytosis     Pancreatitis     Pneumonia     PONV (postoperative nausea and vomiting)     Seasonal affective disorder     Shoulder injury     Stroke     Unexplained weight loss      Past Surgical History:   Procedure Laterality Date    COLON SURGERY      COLONOSCOPY      COLONOSCOPY N/A 2022    Procedure: COLONOSCOPY INTO CECUM WITH HOT SNARE POLYPECTOMY;  Surgeon: Albert Gallo MD;  Location: [facility] ENDOSCOPY;   Service: Gastroenterology;  Laterality: N/A;  PRE- GI BLEED  POST- DIVERTICULOSIS, POLYP    ENDOSCOPY N/A 12/10/2022    Procedure: ESOPHAGOGASTRODUODENOSCOPY;  Surgeon: Albert Gallo MD;  Location: [facility] ENDOSCOPY;  Service: Gastroenterology;  Laterality: N/A;  PRE- DARK STOOLS  POST- BARRETTS ESOPHAGUS    FRACTURE SURGERY  1980    for broken arm    FRACTURE SURGERY      for broken hand    INCISION AND DRAINAGE ABSCESS  2015    anal    PROSTATE SURGERY      SMALL INTESTINE SURGERY      TONSILLECTOMY        General Information       Row Name 10/27/23 1615          Physical Therapy Time and Intention    Document Type evaluation  -EM     Mode of Treatment individual therapy;physical therapy  -EM       Row Name 10/27/23 1615          General Information    Patient Profile Reviewed yes  -EM     Prior Level of Function independent:  pt reports he sometimes uses a cane, also reports 4 falls in past week  -EM     Existing Precautions/Restrictions fall  -EM     Barriers to Rehab medically complex  -EM       Row Name 10/27/23 1615          Living Environment    People in Home alone  states his son lives close and assists as needed  -EM       Row Name 10/27/23 1615          Home Main Entrance    Number of Stairs, Main Entrance other (see comments)  pt reports 2 flights of stairs to enter home, no stairs inside  -EM       Row Name 10/27/23 1615          Cognition    Orientation Status (Cognition) oriented x 4  -EM       Row Name 10/27/23 1615          Safety Issues, Functional Mobility    Safety Issues Affecting Function (Mobility) judgment  -EM     Impairments Affecting Function (Mobility) balance;coordination;endurance/activity tolerance;strength;pain  -EM               User Key  (r) = Recorded By, (t) = Taken By, (c) = Cosigned By      Initials Name Provider Type    EM Viviane Rivas, PT Physical Therapist                   Mobility       Row Name 10/27/23 1616          Bed Mobility    All Activities, [place]  "(Bed Mobility) standby assist  -EM       Row Name 10/27/23 1616          Sit-Stand Transfer    Sit-Stand [place] (Transfers) minimum assist (75% patient effort);contact guard  -EM     Comment, (Sit-Stand Transfer) unsteady with static standing, pt widened RENÉ, states he is more unsteady with static standing than walking  -EM       Row Name 10/27/23 1616          Gait/Stairs (Locomotion)    [place] Level (Gait) minimum assist (75% patient effort);contact guard;2 person assist  -EM     Distance in Feet (Gait) 75  -EM     Deviations/Abnormal Patterns (Gait) stride length decreased;base of support, narrow;ataxic  -EM     Comment, (Gait/Stairs) unsteady, especially when turning, falls risk. pt subjectively reports \"I am usually better than that\"  -EM               User Key  (r) = Recorded By, (t) = Taken By, (c) = Cosigned By      Initials Name Provider Type    EM Viviane Rivas, PT Physical Therapist                   Obj/Interventions       Row Name 10/27/23 1618          Range of Motion Comprehensive    General Range of Motion no range of motion deficits identified  -EM       Row Name 10/27/23 1618          Strength Comprehensive (MMT)    General Manual Muscle Testing (MMT) Assessment other (see comments)  -EM     Comment, General Manual Muscle Testing (MMT) Assessment LLE 4-/5, RLE 5/5  -EM       Row Name 10/27/23 1618          Balance    Balance Assessment standing static balance;standing dynamic balance  -EM     Static Standing Balance contact guard;minimal assist  -EM     Dynamic Standing Balance minimal assist;2-person assist;contact guard  -EM       Row Name 10/27/23 1618          Sensory Assessment (Somatosensory)    Sensory Assessment (Somatosensory) other (see comments)  pt reports tingling in L side since CVA \"2 weeks ago\" (pt at this facility this past week, unsure of time frame of new onset tingling in L side)  -EM               User Key  (r) = Recorded By, (t) = Taken By, (c) = Cosigned By "      Initials Name Provider Type    Viviane Hopkins PT Physical Therapist                   Goals/Plan       Row Name 10/27/23 1625          Bed Mobility Goal 1 (PT)    Activity/Assistive Device (Bed Mobility Goal 1, PT) bed mobility activities, all  -EM     [place] Level/Cues Needed (Bed Mobility Goal 1, PT) modified independence  -EM     Time Frame (Bed Mobility Goal 1, PT) 1 week  -EM       Row Name 10/27/23 1625          Transfer Goal 1 (PT)    Activity/Assistive Device (Transfer Goal 1, PT) transfers, all  -EM     [place] Level/Cues Needed (Transfer Goal 1, PT) supervision required  -EM     Time Frame (Transfer Goal 1, PT) 1 week  -EM       Row Name 10/27/23 1625          Gait Training Goal 1 (PT)    Activity/Assistive Device (Gait Training Goal 1, PT) gait (walking locomotion);assistive device use  -EM     [place] Level (Gait Training Goal 1, PT) standby assist  -EM     Distance (Gait Training Goal 1, PT) 150  -EM     Time Frame (Gait Training Goal 1, PT) 1 week  -EM       Row Name 10/27/23 1624          Therapy Assessment/Plan (PT)    Planned Therapy Interventions (PT) bed mobility training;balance training;gait training;home exercise program;patient/family education;transfer training  -EM               User Key  (r) = Recorded By, (t) = Taken By, (c) = Cosigned By      Initials Name Provider Type    Viviane Hopkins PT Physical Therapist                   Clinical Impression       Row Name 10/27/23 1628          Pain    Pretreatment Pain Rating 4/10  -EM     Pain Location - head  -EM     Pain Intervention(s) Medication (See MAR)  -EM       Row Name 10/27/23 1620          Plan of Care Review    Plan of Care Reviewed With patient  -EM     Outcome Evaluation Pt is 57 yo male seen in obs unit where he came in after fall at home, diagnosed with TIA, L cerebellar PICA, severe stenosis. PMH significant for hospitalization at this facility 10/23-10/26, and 8/16-8/24/23 for CVA. Pt  "reports he went to SNU after hospitalization in August, had a very negative experience and returned home without f/u therapy services. Pt reports he has outpt PT scheduled to begin next week. Patient lives alone, 2 flight of stairs to enter home, uses cane \"sometimes\", reports 4 falls in past week. Today, patient c/o fatigue but agreeable to therapy, performed bed mobility with SBA, sit to stand with min/CGA, ambulated in hallway with min/CGAx2 demonstrating unsteady gait, decreased coordination, decreased balance, decreased activity tolerance which limit safety and independence with functional mobility. Recommend d/c to SNU at current level of function.  -EM       Row Name 10/27/23 0437          Therapy Assessment/Plan (PT)    Patient/Family Therapy Goals Statement (PT) get stronger, improve balance  -EM     Rehab Potential (PT) good, to achieve stated therapy goals  -EM     Criteria for Skilled Interventions Met (PT) yes;skilled treatment is necessary  -EM     Therapy Frequency (PT) 6 times/wk  -EM       Row Name 10/27/23 3382          Positioning and Restraints    Pre-Treatment Position in bed  -EM     Post Treatment Position bed  -EM     In Bed supine;call light within reach;exit alarm on;notified nsg  -EM               User Key  (r) = Recorded By, (t) = Taken By, (c) = Cosigned By      Initials Name Provider Type    EM Viviane Rivas, PT Physical Therapist                   Outcome Measures       Row Name 10/27/23 1626 10/27/23 0859       How much help from another person do you currently need...    Turning from your back to your side while in flat bed without using bedrails? 4  -EM 4  -SF    Moving from lying on back to sitting on the side of a flat bed without bedrails? 3  -EM 4  -SF    Moving to and from a bed to a chair (including a wheelchair)? 3  -EM 4  -SF    Standing up from a chair using your arms (e.g., wheelchair, bedside chair)? 3  -EM 4  -SF    Climbing 3-5 steps with a railing? 3  -EM 4  -SF "    To walk in hospital room? 3  -EM 4  -SF    AM-PAC 6 Clicks Score (PT) 19  -EM 24  -SF    Highest level of mobility 6 --> Walked 10 steps or more  -EM 8 --> Walked 250 feet or more  -      Row Name 10/27/23 1626          Modified [place] Scale    Modified [place] Scale 4 - Moderately severe disability.  Unable to walk without assistance, and unable to attend to own bodily needs without assistance.  -       Row Name 10/27/23 1626 10/27/23 1539       Functional Assessment    Outcome Measure Options Modified [place]  - AM-PAC 6 Clicks Daily Activity (OT)  -VS              User Key  (r) = Recorded By, (t) = Taken By, (c) = Cosigned By      Initials Name Provider Type    VS Agatha Meraz OTR Occupational Therapist    EM Viviane Rivas, PT Physical Therapist     Aubrie Jay, RN Registered Nurse                                 Physical Therapy Education       Title: PT OT SLP Therapies (In Progress)       Topic: Physical Therapy (In Progress)       Point: Mobility training (Done)       Learning Progress Summary             Patient Acceptance, E, VU,NR by  at 10/27/2023 1627                         Point: Home exercise program (Not Started)       Learner Progress:  Not documented in this visit.              Point: Body mechanics (Not Started)       Learner Progress:  Not documented in this visit.              Point: Precautions (Not Started)       Learner Progress:  Not documented in this visit.                              User Key       Initials Effective Dates Name Provider Type Discipline     06/16/21 -  Viviane Rivas, PT Physical Therapist PT                  PT Recommendation and Plan  Planned Therapy Interventions (PT): bed mobility training, balance training, gait training, home exercise program, patient/family education, transfer training  Plan of Care Reviewed With: patient  Outcome Evaluation: Pt is 57 yo male seen in obs unit where he came in after fall at home, diagnosed with TIA,  "L cerebellar PICA, severe stenosis. PMH significant for hospitalization at this facility 10/23-10/26, and 8/16-8/24/23 for CVA. Pt reports he went to SNU after hospitalization in August, had a very negative experience and returned home without f/u therapy services. Pt reports he has outpt PT scheduled to begin next week. Patient lives alone, 2 flight of stairs to enter home, uses cane \"sometimes\", reports 4 falls in past week. Today, patient c/o fatigue but agreeable to therapy, performed bed mobility with SBA, sit to stand with min/CGA, ambulated in hallway with min/CGAx2 demonstrating unsteady gait, decreased coordination, decreased balance, decreased activity tolerance which limit safety and independence with functional mobility. Recommend d/c to SNU at current level of function.     Time Calculation:         PT Charges       Row Name 10/27/23 1627             Time Calculation    Start Time 1549  -EM      Stop Time 1608  -EM      Time Calculation (min) 19 min  -EM      PT Received On 10/27/23  -EM      PT - Next Appointment 10/28/23  -EM      PT Goal Re-Cert Due Date 11/03/23  -EM         Time Calculation- PT    Total Timed Code Minutes- PT 10 minute(s)  -EM         Timed Charges    55719 - PT Therapeutic Activity Minutes 10  -EM         Total Minutes    Timed Charges Total Minutes 10  -EM       Total Minutes 10  -EM                User Key  (r) = Recorded By, (t) = Taken By, (c) = Cosigned By      Initials Name Provider Type    EM Viviane Rivas, PT Physical Therapist                  Therapy Charges for Today       Code Description Service Date Service Provider Modifiers Qty    81126483536  PT THERAPEUTIC ACT EA 15 MIN 10/27/2023 Viviane Rivas, PT GP 1    86141679633 HC PT EVAL MOD COMPLEXITY 2 10/27/2023 Viviane Rivas, PT GP 1    92198844178 HC PT THER SUPP EA 15 MIN 10/27/2023 Viviane Rivas, PT GP 1            PT G-Codes  Outcome Measure Options: Modified [place]  AM-PAC 6 Clicks " Score (PT): 19  AM-PAC 6 Clicks Score (OT): 19  Modified [place] Scale: 4 - Moderately severe disability.  Unable to walk without assistance, and unable to attend to own bodily needs without assistance.  PT Discharge Summary  Anticipated Discharge Disposition (PT): skilled nursing facility    Viviane Rivas, PT  10/27/2023

## 2023-10-27 NOTE — CASE MANAGEMENT/SOCIAL WORK
Discharge Planning Assessment  [facility]     Patient Name: Flako Coreas  MRN: [id number]  Today's Date: 10/27/2023    Admit Date: 10/26/2023    Plan: Plans ?[facility]/Edgardo Castillo RN   Discharge Needs Assessment       Row Name 10/27/23 1211       Living Environment    People in Home alone    Current Living Arrangements apartment    Potentially Unsafe Housing Conditions none    Primary Care Provided by self    Provides Primary Care For no one    Family Caregiver if Needed child(temo), adult    Family Caregiver Names Flako [phone]    Quality of Family Relationships supportive    Able to Return to Prior Arrangements --  To be evaluated by PT and OT, then proceed w/ referrals to ST rehab       Resource/Environmental Concerns    Transportation Concerns no car       Transition Planning    Patient/Family Anticipates Transition to other (see comments)  ST rehab or home w/ HH    Transportation Anticipated health plan transportation       Discharge Needs Assessment    Equipment Currently Used at Home other (see comments)  walking stick at times    Concerns to be Addressed discharge planning    Anticipated Changes Related to Illness inability to care for self    Equipment Needed After Discharge other (see comments)  to be determined    Provided Post Acute Provider List? Yes    Post Acute Provider List --  [facility] rehab    Delivered To Patient    Method of Delivery In person                   Discharge Plan       Row Name 10/27/23 1219       Plan    Plan Plans ?[facility]/Edgardo Castillo RN    Patient/Family in Agreement with Plan yes    Provided Post Acute Provider List? Yes    Plan Comments Spoke w/ pt at bedside w/ his permission. Introduced self and explained role. All info on facesheet, including PCP as SIMRAN Rodriguez, verified. Lives alone in single level ap't w/ two flights of stairs to enter; has been able to navigate steps and around ap't w/o difficulty until present illness. Has been independent w/ ADLs until  present illness. Uses walking cane at times. Uses B/P cuff at home. DME and community resources TBD. Awaiting PT and OT eval. Gave RTR; will follow up after evals. Pt stated he may agree to [facility], not [facility]. Agreed to provide facility ratings. Explained process. Uses [facility] on [address] per chart, already enrolled in Meds to Beds. States that if he is not able to go to [facility] Rehab, he would not want HH but would agree to it, CM to follow-ERIC Castillo RN                  Continued Care and Services - Admitted Since 10/26/2023    Coordination has not been started for this encounter.       Selected Continued Care - Prior Encounters Includes continued care and service providers with selected services from prior encounters from 7/28/2023 to 10/27/2023      Discharged on 8/24/2023 Admission date: 8/19/2023 - Discharge disposition: Skilled Nursing Facility (DC - External)      Destination       Service Provider Selected Services Address Phone Fax Patient Preferred    [facility] [address] [address] [phone] --                             Demographic Summary       Row Name 10/27/23 1209       General Information    Admission Type observation    Arrived From emergency department    Referral Source admission list;physician    Reason for Consult discharge planning    Preferred Language English       Contact Information    Permission Granted to Share Info With                    Functional Status       Row Name 10/27/23 1206       Functional Status    Usual Activity Tolerance fair    Current Activity Tolerance poor       Functional Status, IADL    Medications independent    IADL Comments has been independent w/ tasks until current illness       Mental Status    General Appearance WDL WDL       Mental Status Summary    Recent Changes in Mental Status/Cognitive Functioning no changes                   Psychosocial       Row  Name 10/27/23 1210       Behavior WDL    Behavior WDL WDL       Emotion Mood WDL    Emotion/Mood/Affect WDL WDL       Speech WDL    Speech WDL WDL       Perceptual State WDL    Perceptual State WDL WDL       Thought Process WDL    Thought Process WDL WDL       Intellectual Performance WDL    Intellectual Performance WDL WDL    Level of Consciousness Alert                   Abuse/Neglect    No documentation.                  Legal    No documentation.                  Substance Abuse    No documentation.                  Patient Forms    No documentation.                     Henny Castillo RN

## 2023-10-27 NOTE — ED TRIAGE NOTES
Pt presents to ED via [facility] from home after a fall-pt states he began feeling generalized weakness and dizziness around 1900 and then had a fall after taking a bath. Pt has a laceration to forehead and a hematoma/deformity to right arm. Pt states he was feeling nauseated prior to taking a bath and then after he fell, he vomited.

## 2023-10-28 PROBLEM — F10.10 ALCOHOL ABUSE: Status: ACTIVE | Noted: 2023-10-28

## 2023-10-28 LAB
BASOPHILS # BLD AUTO: 0.03 10*3/MM3 (ref 0–0.2)
BASOPHILS NFR BLD AUTO: 0.4 % (ref 0–1.5)
DEPRECATED RDW RBC AUTO: 45.8 FL (ref 37–54)
EOSINOPHIL # BLD AUTO: 0.38 10*3/MM3 (ref 0–0.4)
EOSINOPHIL NFR BLD AUTO: 5 % (ref 0.3–6.2)
ERYTHROCYTE [DISTWIDTH] IN BLOOD BY AUTOMATED COUNT: 14.9 % (ref 12.3–15.4)
GLUCOSE BLDC GLUCOMTR-MCNC: 120 MG/DL (ref 70–130)
HCT VFR BLD AUTO: 36.2 % (ref 37.5–51)
HCT VFR BLD AUTO: 38.7 % (ref 37.5–51)
HGB BLD-MCNC: 12 G/DL (ref 13–17.7)
HGB BLD-MCNC: 12.9 G/DL (ref 13–17.7)
IMM GRANULOCYTES # BLD AUTO: 0.02 10*3/MM3 (ref 0–0.05)
IMM GRANULOCYTES NFR BLD AUTO: 0.3 % (ref 0–0.5)
LYMPHOCYTES # BLD AUTO: 1.7 10*3/MM3 (ref 0.7–3.1)
LYMPHOCYTES NFR BLD AUTO: 22.3 % (ref 19.6–45.3)
MCH RBC QN AUTO: 28 PG (ref 26.6–33)
MCHC RBC AUTO-ENTMCNC: 33.3 G/DL (ref 31.5–35.7)
MCV RBC AUTO: 83.9 FL (ref 79–97)
MONOCYTES # BLD AUTO: 0.61 10*3/MM3 (ref 0.1–0.9)
MONOCYTES NFR BLD AUTO: 8 % (ref 5–12)
NEUTROPHILS NFR BLD AUTO: 4.87 10*3/MM3 (ref 1.7–7)
NEUTROPHILS NFR BLD AUTO: 64 % (ref 42.7–76)
NRBC BLD AUTO-RTO: 0 /100 WBC (ref 0–0.2)
PLATELET # BLD AUTO: 201 10*3/MM3 (ref 140–450)
PMV BLD AUTO: 9.9 FL (ref 6–12)
RBC # BLD AUTO: 4.61 10*6/MM3 (ref 4.14–5.8)
WBC NRBC COR # BLD: 7.61 10*3/MM3 (ref 3.4–10.8)

## 2023-10-28 PROCEDURE — 85018 HEMOGLOBIN: CPT | Performed by: NURSE PRACTITIONER

## 2023-10-28 PROCEDURE — 25010000002 LORAZEPAM PER 2 MG: Performed by: STUDENT IN AN ORGANIZED HEALTH CARE EDUCATION/TRAINING PROGRAM

## 2023-10-28 PROCEDURE — G0378 HOSPITAL OBSERVATION PER HR: HCPCS

## 2023-10-28 PROCEDURE — 96376 TX/PRO/DX INJ SAME DRUG ADON: CPT

## 2023-10-28 PROCEDURE — 96375 TX/PRO/DX INJ NEW DRUG ADDON: CPT

## 2023-10-28 PROCEDURE — 96361 HYDRATE IV INFUSION ADD-ON: CPT

## 2023-10-28 PROCEDURE — 99254 IP/OBS CNSLTJ NEW/EST MOD 60: CPT | Performed by: INTERNAL MEDICINE

## 2023-10-28 PROCEDURE — 85014 HEMATOCRIT: CPT | Performed by: NURSE PRACTITIONER

## 2023-10-28 PROCEDURE — 85025 COMPLETE CBC W/AUTO DIFF WBC: CPT | Performed by: INTERNAL MEDICINE

## 2023-10-28 PROCEDURE — 25010000002 THIAMINE HCL 200 MG/2ML SOLUTION: Performed by: STUDENT IN AN ORGANIZED HEALTH CARE EDUCATION/TRAINING PROGRAM

## 2023-10-28 PROCEDURE — 25010000002 THIAMINE PER 100 MG: Performed by: STUDENT IN AN ORGANIZED HEALTH CARE EDUCATION/TRAINING PROGRAM

## 2023-10-28 PROCEDURE — 25010000002 SODIUM CHLORIDE 0.9 % WITH KCL 20 MEQ 20-0.9 MEQ/L-% SOLUTION: Performed by: INTERNAL MEDICINE

## 2023-10-28 PROCEDURE — 97530 THERAPEUTIC ACTIVITIES: CPT

## 2023-10-28 RX ORDER — PANTOPRAZOLE SODIUM 40 MG/1
40 TABLET, DELAYED RELEASE ORAL
Status: DISCONTINUED | OUTPATIENT
Start: 2023-10-29 | End: 2023-11-06 | Stop reason: HOSPADM

## 2023-10-28 RX ADMIN — LORAZEPAM 2 MG: 2 INJECTION INTRAMUSCULAR; INTRAVENOUS at 21:33

## 2023-10-28 RX ADMIN — ATORVASTATIN CALCIUM 80 MG: 80 TABLET, FILM COATED ORAL at 00:21

## 2023-10-28 RX ADMIN — HYDRALAZINE HYDROCHLORIDE 100 MG: 50 TABLET ORAL at 21:35

## 2023-10-28 RX ADMIN — Medication 10 ML: at 08:53

## 2023-10-28 RX ADMIN — AMLODIPINE BESYLATE 5 MG: 5 TABLET ORAL at 08:52

## 2023-10-28 RX ADMIN — THIAMINE HYDROCHLORIDE 200 MG: 100 INJECTION, SOLUTION INTRAMUSCULAR; INTRAVENOUS at 13:45

## 2023-10-28 RX ADMIN — ACETAMINOPHEN 650 MG: 325 TABLET, FILM COATED ORAL at 17:41

## 2023-10-28 RX ADMIN — ASPIRIN 81 MG: 81 TABLET, COATED ORAL at 08:52

## 2023-10-28 RX ADMIN — POTASSIUM CHLORIDE AND SODIUM CHLORIDE 100 ML/HR: 900; 150 INJECTION, SOLUTION INTRAVENOUS at 09:33

## 2023-10-28 RX ADMIN — POTASSIUM CHLORIDE AND SODIUM CHLORIDE 100 ML/HR: 900; 150 INJECTION, SOLUTION INTRAVENOUS at 21:35

## 2023-10-28 RX ADMIN — TICAGRELOR 60 MG: 60 TABLET ORAL at 00:21

## 2023-10-28 RX ADMIN — LISINOPRIL 40 MG: 20 TABLET ORAL at 08:53

## 2023-10-28 RX ADMIN — CARVEDILOL 6.25 MG: 6.25 TABLET, FILM COATED ORAL at 17:41

## 2023-10-28 RX ADMIN — TICAGRELOR 60 MG: 60 TABLET ORAL at 08:52

## 2023-10-28 RX ADMIN — THIAMINE HYDROCHLORIDE 200 MG: 100 INJECTION, SOLUTION INTRAMUSCULAR; INTRAVENOUS at 19:54

## 2023-10-28 RX ADMIN — FOLIC ACID 1 MG: 1 TABLET ORAL at 08:53

## 2023-10-28 RX ADMIN — HYDROXYZINE HYDROCHLORIDE 25 MG: 25 TABLET ORAL at 22:47

## 2023-10-28 RX ADMIN — SENNOSIDES AND DOCUSATE SODIUM 2 TABLET: 50; 8.6 TABLET ORAL at 08:52

## 2023-10-28 RX ADMIN — MULTIPLE VITAMINS W/ MINERALS TAB 1 TABLET: TAB at 08:52

## 2023-10-28 RX ADMIN — THIAMINE HYDROCHLORIDE 200 MG: 100 INJECTION, SOLUTION INTRAMUSCULAR; INTRAVENOUS at 00:25

## 2023-10-28 RX ADMIN — CARVEDILOL 6.25 MG: 6.25 TABLET, FILM COATED ORAL at 08:52

## 2023-10-28 RX ADMIN — LORAZEPAM 2 MG: 2 INJECTION INTRAMUSCULAR; INTRAVENOUS at 19:49

## 2023-10-28 RX ADMIN — VILAZODONE HYDROCHLORIDE 40 MG: 40 TABLET, FILM COATED ORAL at 08:52

## 2023-10-28 RX ADMIN — ATORVASTATIN CALCIUM 80 MG: 80 TABLET, FILM COATED ORAL at 19:53

## 2023-10-28 RX ADMIN — CARVEDILOL 6.25 MG: 6.25 TABLET, FILM COATED ORAL at 00:21

## 2023-10-28 RX ADMIN — THIAMINE HYDROCHLORIDE 200 MG: 100 INJECTION, SOLUTION INTRAMUSCULAR; INTRAVENOUS at 06:25

## 2023-10-28 RX ADMIN — LORAZEPAM 2 MG: 2 INJECTION INTRAMUSCULAR; INTRAVENOUS at 09:02

## 2023-10-28 RX ADMIN — POTASSIUM CHLORIDE AND SODIUM CHLORIDE 100 ML/HR: 900; 150 INJECTION, SOLUTION INTRAVENOUS at 08:53

## 2023-10-28 RX ADMIN — HYDROXYZINE HYDROCHLORIDE 25 MG: 25 TABLET ORAL at 09:03

## 2023-10-28 RX ADMIN — HYDRALAZINE HYDROCHLORIDE 100 MG: 50 TABLET ORAL at 06:25

## 2023-10-28 RX ADMIN — TICAGRELOR 60 MG: 60 TABLET ORAL at 19:53

## 2023-10-28 RX ADMIN — HYDRALAZINE HYDROCHLORIDE 100 MG: 50 TABLET ORAL at 13:45

## 2023-10-28 NOTE — PLAN OF CARE
Problem: Fall Injury Risk  Goal: Absence of Fall and Fall-Related Injury  10/28/2023 0718 by Sherie Arauz RN  Outcome: Ongoing, Progressing  10/28/2023 0700 by Sherie Arauz RN  Outcome: Ongoing, Progressing  Intervention: Identify and Manage Contributors  Recent Flowsheet Documentation  Taken 10/27/2023 2000 by Sherie Arauz RN  Medication Review/Management: medications reviewed  Intervention: Promote Injury-Free Environment  Recent Flowsheet Documentation  Taken 10/27/2023 2000 by Sherie Arauz RN  Safety Promotion/Fall Prevention:   activity supervised   assistive device/personal items within reach   clutter free environment maintained   fall prevention program maintained   nonskid shoes/slippers when out of bed   safety round/check completed     Problem: Alcohol Withdrawal  Goal: Alcohol Withdrawal Symptom Control  10/28/2023 0718 by Sherie Arauz RN  Outcome: Ongoing, Progressing  10/28/2023 0700 by Sherie Arauz RN  Outcome: Ongoing, Progressing     Problem: Substance Misuse (Alcohol Withdrawal)  Goal: Readiness for Change Identified  10/28/2023 0718 by Sherie Arauz RN  Problem: Nutrition Imbalance (Depressive Signs/Symptoms)  Goal: Optimized Nutrition Intake  10/28/2023 0718 by Sheire Arauz RN  Outcome: Ongoing, Progressing  10/28/2023 0700 by Sherie Arauz RN  Outcome: Ongoing, Progressing     Outcome: Ongoing, Progressing  10/28/2023 0700 by Sherie Arauz RN  Outcome: Ongoing, Progressing     Problem: VTE (Venous Thromboembolism)  Goal: VTE (Venous Thromboembolism) Symptom Resolution  10/28/2023 0718 by Sherie Arauz RN  Outcome: Ongoing, Progressing  10/28/2023 0700 by Sherie Arauz RN  Outcome: Ongoing, Progressing  Intervention: Prevent or Manage VTE (Venous Thromboembolism)  Recent Flowsheet Documentation  Taken 10/27/2023 2000 by Sherie Arauz RN  VTE Prevention/Management: ([place]) --   Goal Outcome Evaluation:               Outcome Evaluation: Patient drowsy when arriving to floor but able to answer questions, laceration to forehead, assist x2 d/t unsteady when ambulating,

## 2023-10-28 NOTE — PLAN OF CARE
Goal Outcome Evaluation:  Plan of Care Reviewed With: patient        Progress: improving  Outcome Evaluation: Patient was agreeable to working with PT. He was SBA for bed mobility and CGA to stand. He ambulated 80'x2 with standing rest break. He used the RW today d/t feeling more unsteady and required CGA/Natasha. He occasionally did not get full clearence of his L foot and required VC to widen his RENÉ. Patient will benefit from continued skilled PT addressing limitations in functional mobiltiy to maximize safety and independence.

## 2023-10-28 NOTE — PROGRESS NOTES
Name: Flako Coreas ADMIT: 10/26/2023   : 1965  PCP: Akash Rodriguez Jr., DO    MRN: [id number] LOS: 0 days   AGE/SEX: 58 y.o. male  [facility]     Subjective   Subjective     Patient is lying on the bed and does not appear to be in major distress.  Denies nausea, vomiting abdominal pain, chest pain.       Objective   Objective   Vital Signs  Temp:  [97.5 °F (36.4 °C)-98.4 °F (36.9 °C)] 98.4 °F (36.9 °C)  Heart Rate:  [53-84] 74  Resp:  [16-18] 18  BP: (118-156)/(67-85) 130/85  SpO2:  [91 %-98 %] 97 %  on   ;   Device (Oxygen Therapy): room air  Body mass index is 29.66 kg/m².  Physical Exam  HEENT: PERRLA, extract movements intact, Scleras no icterus  Neck: Supple, no JVD  Cardiovascular: Regular rate and rhythm with normal S1 and S2  Respiratory: Fairly clear to auscultation bilaterally with no wheezes  GI: Soft, nontender, bowel sounds are present  Extremities: No edema, palpable pedal pulses  Neurologic: Grossly nonfocal, no facial asymmetry      Results Review     I reviewed the patient's new clinical results.  Results from last 7 days   Lab Units 10/28/23  1030 10/26/23  2032 10/23/23  1909   WBC 10*3/mm3 7.61 9.71 8.87   HEMOGLOBIN g/dL 12.9* 14.5 13.1   PLATELETS 10*3/mm3 201 328 269     Results from last 7 days   Lab Units 10/27/23  0421 10/26/23  2014 10/23/23  1909   SODIUM mmol/L 144 140 143   POTASSIUM mmol/L 3.8 3.7 3.6   CHLORIDE mmol/L 109* 102 105   CO2 mmol/L 24.2 19.0* 29.0   BUN mg/dL 21* 20 20   CREATININE mg/dL 1.06 1.46* 1.19   GLUCOSE mg/dL 95 127* 131*   EGFR mL/min/1.73 82.4 56.1* 71.7     Results from last 7 days   Lab Units 10/26/23  2014 10/23/23  1909   ALBUMIN g/dL 4.9 4.3   BILIRUBIN mg/dL 0.7 0.3   ALK PHOS U/L 73 68   AST (SGOT) U/L 24 23   ALT (SGPT) U/L 42* 29     Results from last 7 days   Lab Units 10/27/23  0421 10/26/23  2014 10/23/23  190   CALCIUM mg/dL 8.7 9.7 9.3   ALBUMIN g/dL  --  4.9 4.3       Glucose   Date/Time Value Ref Range Status    10/26/2023 2033 120 70 - 130 mg/dL Final       MRI Brain Without Contrast    Result Date: 10/27/2023  Electronically signed by Antonio More MD on 10-27-23 at 0057    CT Head Without Contrast Stroke Protocol    Result Date: 10/26/2023  Negative head CT.  Radiation dose reduction techniques were utilized, including automated exposure control and exposure modulation based on body size.   This report was finalized on 10/26/2023 9:22 PM by Dr. Pk Oropeza M.D on Workstation: [id number]      XR Chest 1 View    Result Date: 10/26/2023  Negative.  This report was finalized on 10/26/2023 9:11 PM by Dr. Pk Oropeza M.D on Workstation: [id number]       I have personally reviewed all medications:  Scheduled Medications  amLODIPine, 5 mg, Oral, Daily  aspirin, 81 mg, Oral, Daily  atorvastatin, 80 mg, Oral, Nightly  carvedilol, 6.25 mg, Oral, BID With Meals  folic acid, 1 mg, Oral, Daily  hydrALAZINE, 100 mg, Oral, Q8H  lidocaine 1% - EPINEPHrine 1:[id number], 10 mL, Injection, Once  lisinopril, 40 mg, Oral, Daily  multivitamin with minerals, 1 tablet, Oral, Daily  [START ON 10/29/2023] pantoprazole, 40 mg, Oral, Q AM  senna-docusate sodium, 2 tablet, Oral, BID  sodium chloride, 10 mL, Intravenous, Q12H  thiamine (B-1) IV, 200 mg, Intravenous, Q8H   Followed by  [START ON 11/2/2023] thiamine, 100 mg, Oral, Daily  ticagrelor, 60 mg, Oral, BID  vilazodone, 40 mg, Oral, Daily    Infusions  sodium chloride 0.9 % with KCl 20 mEq, 100 mL/hr, Last Rate: 100 mL/hr (10/28/23 0933)    Diet  Diet: Cardiac Diets; Healthy Heart (2-3 Na+); Texture: Regular Texture (IDDSI 7); Fluid Consistency: Thin (IDDSI 0)    I have personally reviewed:  [x]  Laboratory   [x]  Microbiology   [x]  Radiology   [x]  EKG/Telemetry  [x]  Cardiology/Vascular   []  Pathology    []  Records       Assessment/Plan     Active Hospital Problems    Diagnosis  POA    Concussion [S06.0XAA]  Yes    Fall [W19.XXXA]  Yes    Head injury, closed, with concussion  [S06.0XAA]  Yes      Resolved Hospital Problems    Diagnosis Date Resolved POA    **TIA (transient ischemic attack) [G45.9] 10/27/2023 Yes       58 y.o. male admitted with TIA (transient ischemic attack).    1.Head injury/concussion secondary to vertigo from left cerebellar PICA severe stenosis, neurology did evaluate and MRI during this admission did not reveal any new acute infarcts or subacute infarcts.  Recommended SNF.  Continue with aspirin, Brilinta and statins.  2.  Subacute left cerebellar CVA diagnosed on 10/23/2023, treatment as mentioned above.  3.  Rectal bleeding, GI did evaluate today and offered further work-up however patient opted to postpone work-up until he can come off Brilinta which is 90 days from start date.  4.  Hypertension, on amlodipine, Coreg, hydralazine and monitor blood pressure closely.  5.  Ethanol abuse, will monitor for withdrawal and is on a CIWA protocol.  Continue with folate and thiamine supplements.  He was counseled extensively to quit drinking alcohol.  6.  On SCDs for DVT prophylaxis.  7.  CODE STATUS is full code.  8.  Estimated discharge date, to rehab once a bed is available.      Mann Briseno MD  [facility]  10/28/23  15:50 EDT

## 2023-10-28 NOTE — CONSULTS
[facility]  Initial Inpatient Consult Note    Referring Provider: Dr. Mann Briseno    Reason for Consultation: Bloody stools    Subjective     History of present illness:    58 y.o. male with a h/o Lepe's esophagus, diverticulitis, h/o small bowel resection for small bowel diverticuliitis, GERD, HTN, pancreatitis, CVA's (on Brilinta), and h/o alcoholism who was admitted through the observation unit on 10/26/2023 with increased dizziness and a fall at home.  We are asked to evaluate a 4-week history of bloody stool while on Brilinta and aspirin.  He has no diarrhea or constipation.  No melena.  No abdominal pain or chest pain.  He does have nausea he drinks 4 drinks a day.  He is a non-smoker.  His weight is stable.  Does have heartburn.         He had colonoscopy with Dr. BRYSON Gallo in 12/22 when a 12 mm ascending colon polyp (was a tubular adenoma on path) was removed.  There were scattered diverticula in the in the left colon with internal hemorrhoids.  Dr. Gallo recommended a repeat colonoscopy in 5 years.        He had an EGD on 12/10/2022 by Dr. Albert Gallo that showed long segment Lepe's esophagus.  The esophagus was not biopsied because the patient was in with melena and he thought it would confuse the issue.  He recommended that the patient follow-up with Dr. Springer to have a repeat upper endoscopy in 3 months to biopsy the Lepe's mucosa.    Past Medical History:  Past Medical History:   Diagnosis Date    ADHD (attention deficit hyperactivity disorder)     On going issue    Anxiety     Lepe's esophagus     Benign prostatic hyperplasia Surgery in 12/2021    Clotting disorder Intestinal    Depression     Diverticulitis     Diverticulosis     Duodenitis     Enteritis     Failure to thrive (child)     Family history of blood clots     GERD (gastroesophageal reflux disease)     Hyperlipidemia     Hypertension     Hypertensive emergency     Low testosterone     Microcytosis      Pancreatitis     Pneumonia     PONV (postoperative nausea and vomiting)     Seasonal affective disorder     Shoulder injury     Stroke     Unexplained weight loss      Past Surgical History:  Past Surgical History:   Procedure Laterality Date    COLON SURGERY      COLONOSCOPY      COLONOSCOPY N/A 12/11/2022    Procedure: COLONOSCOPY INTO CECUM WITH HOT SNARE POLYPECTOMY;  Surgeon: Albert Gallo MD;  Location: [facility] ENDOSCOPY;  Service: Gastroenterology;  Laterality: N/A;  PRE- GI BLEED  POST- DIVERTICULOSIS, POLYP    ENDOSCOPY N/A 12/10/2022    Procedure: ESOPHAGOGASTRODUODENOSCOPY;  Surgeon: Albert Gallo MD;  Location: [facility] ENDOSCOPY;  Service: Gastroenterology;  Laterality: N/A;  PRE- DARK STOOLS  POST- BARRETTS ESOPHAGUS    FRACTURE SURGERY  1980    for broken arm    FRACTURE SURGERY      for broken hand    INCISION AND DRAINAGE ABSCESS  2015    anal    PROSTATE SURGERY      SMALL INTESTINE SURGERY      TONSILLECTOMY        Social History:   Social History     Tobacco Use    Smoking status: Never    Smokeless tobacco: Never   Substance Use Topics    Alcohol use: Yes     Alcohol/week: 70.0 standard drinks of alcohol     Types: 70 Shots of liquor per week     Comment: patient states 9-10 drinks daily      Family History:  Family History   Problem Relation Age of Onset    Stroke Mother     Stroke Father        Home Meds:  Medications Prior to Admission   Medication Sig Dispense Refill Last Dose    amLODIPine (NORVASC) 10 MG tablet Take 0.5 tablets by mouth Daily.   10/26/2023    aspirin 81 MG EC tablet Take 1 tablet by mouth Daily. 90 tablet 1 10/26/2023    atorvastatin (LIPITOR) 80 MG tablet Take 1 tablet by mouth Every Night. 30 tablet 0 10/26/2023    carvedilol (COREG) 6.25 MG tablet Take 1 tablet by mouth 2 (Two) Times a Day With Meals. 180 tablet 1 10/26/2023    hydrALAZINE (APRESOLINE) 100 MG tablet Take 1 tablet by mouth Every 8 (Eight) Hours. 270 tablet 1 10/26/2023    hydroCHLOROthiazide  (HYDRODIURIL) 12.5 MG tablet Take 1 tablet by mouth Daily. 30 tablet 1 10/26/2023    hydrOXYzine (ATARAX) 25 MG tablet Take 1 tablet by mouth 3 (Three) Times a Day As Needed for Anxiety. 90 tablet 0 10/26/2023    lisinopril (PRINIVIL,ZESTRIL) 40 MG tablet Take 1 tablet by mouth Daily. 90 tablet 1 10/26/2023    spironolactone (ALDACTONE) 25 MG tablet Take 1 tablet by mouth Daily. 90 tablet 1 10/26/2023    ticagrelor (BRILINTA) 60 MG tablet tablet Take 1 tablet by mouth 2 (Two) Times a Day for 30 days. 60 tablet 0 10/26/2023    vilazodone (Viibryd) 40 MG tablet tablet Take 1 tablet by mouth Daily. 90 tablet 1 10/26/2023    meclizine (ANTIVERT) 25 MG tablet         Current Meds:   amLODIPine, 5 mg, Oral, Daily  aspirin, 81 mg, Oral, Daily  atorvastatin, 80 mg, Oral, Nightly  carvedilol, 6.25 mg, Oral, BID With Meals  folic acid, 1 mg, Oral, Daily  hydrALAZINE, 100 mg, Oral, Q8H  lidocaine 1% - EPINEPHrine 1:027573, 10 mL, Injection, Once  lisinopril, 40 mg, Oral, Daily  multivitamin with minerals, 1 tablet, Oral, Daily  senna-docusate sodium, 2 tablet, Oral, BID  sodium chloride, 10 mL, Intravenous, Q12H  thiamine (B-1) IV, 200 mg, Intravenous, Q8H   Followed by  [START ON 11/2/2023] thiamine, 100 mg, Oral, Daily  ticagrelor, 60 mg, Oral, BID  vilazodone, 40 mg, Oral, Daily      Allergies:  No Known Allergies  Review of Systems  The following systems were reviewed and negative;  respiratory, cardiovascular, musculoskeletal, and neurological     Objective     Vital Signs  Temp:  [97.5 °F (36.4 °C)-98.4 °F (36.9 °C)] 98.4 °F (36.9 °C)  Heart Rate:  [53-84] 84  Resp:  [16-18] 18  BP: (110-156)/(67-98) 156/79  Physical Exam:  General Appearance:    Alert, cooperative, in no acute distress   Head:    Normocephalic, without obvious abnormality, atraumatic   Eyes:            Lids and lashes normal, conjunctivae and sclerae normal, no   icterus   Throat:   No oral lesions, no thrush, oral mucosa moist   Neck:   No adenopathy,  supple, trachea midline, no thyromegaly, no   carotid bruit, no JVD   Lungs:     Clear to auscultation,respirations regular, even and                   unlabored    Heart:    Regular rhythm and normal rate, normal S1 and S2, no            murmur, no gallop, no rub, no click   Chest Wall:    No abnormalities observed   Abdomen:     Normal bowel sounds, no masses, no organomegaly, soft        nontender, nondistended, no guarding, no rebound                 tenderness   Rectal:     Deferred   Extremities:   no edema, no cyanosis, no redness   Skin:   No bleeding, bruising or rash   Lymph nodes:   No palpable adenopathy   Psychiatric:  Judgement and insight: normal   Orientation to person place and time: normal   Mood and affect: normal   Results Review:   I reviewed the patient's new clinical results.    Results from last 7 days   Lab Units 10/28/23  1030 10/26/23  2032 10/23/23  1909   WBC 10*3/mm3 7.61 9.71 8.87   HEMOGLOBIN g/dL 12.9* 14.5 13.1   HEMATOCRIT % 38.7 42.7 39.1   PLATELETS 10*3/mm3 201 328 269     Results from last 7 days   Lab Units 10/27/23  0421 10/26/23  2014 10/23/23  1909   SODIUM mmol/L 144 140 143   POTASSIUM mmol/L 3.8 3.7 3.6   CHLORIDE mmol/L 109* 102 105   CO2 mmol/L 24.2 19.0* 29.0   BUN mg/dL 21* 20 20   CREATININE mg/dL 1.06 1.46* 1.19   CALCIUM mg/dL 8.7 9.7 9.3   BILIRUBIN mg/dL  --  0.7 0.3   ALK PHOS U/L  --  73 68   ALT (SGPT) U/L  --  42* 29   AST (SGOT) U/L  --  24 23   GLUCOSE mg/dL 95 127* 131*     Results from last 7 days   Lab Units 10/26/23  2014 10/23/23  1909   INR  0.99 0.93     Lab Results   Lab Value Date/Time    LIPASE 34 12/09/2022 0759    LIPASE 220 (H) 12/08/2022 0653    LIPASE 87 (H) 12/02/2022 1124    LIPASE 380 (H) 07/01/2019 0727    LIPASE 766 (H) 02/12/2019 0847    LIPASE 204 12/18/2018 0633    LIPASE 190 11/27/2018 0820    LIPASE 187 11/14/2018 2334    LIPASE 182 11/10/2018 0915    LIPASE 65 (H) 11/05/2018 0630    LIPASE 146 09/18/2018 0822    LIPASE 275  07/26/2018 2028    LIPASE 468 (H) 07/18/2018 0555    LIPASE 156 06/27/2018 0440    LIPASE 190 06/18/2018 0537    LIPASE 180 05/18/2018 1538    LIPASE 153 05/12/2018 0847    LIPASE 99 03/30/2018 1455    LIPASE 87 (H) 01/01/2018 0949    LIPASE 52 12/22/2017 1029       Radiology:  MRI Brain Without Contrast   Final Result         Electronically signed by Antonio More MD on 10-27-23 at 0057      XR Chest 1 View   Final Result   Negative.       This report was finalized on 10/26/2023 9:11 PM by Dr. Pk Oropeza M.D on Workstation: [id number]          CT Head Without Contrast Stroke Protocol   Final Result   Negative head CT.       Radiation dose reduction techniques were utilized, including automated   exposure control and exposure modulation based on body size.           This report was finalized on 10/26/2023 9:22 PM by Dr. Pk Oropeza M.D on Workstation: [id number]              Assessment & Plan   Assessment:     58 y.o. male with a h/o Lepe's esophagus, diverticulitis, h/o small bowel resection for small bowel diverticuliitis, GERD, HTN, pancreatitis, CVA's (on Brilinta), and h/o alcoholism  2. A 4 week h/o rectal bleeding while on Brilinta and a baby aspirin/day  3.       Plan:   I will check serial H&H's and transfuse as necessary.  I will put him on Protonix 40 mg p.o. daily because of the Lepe's esophagus.  I had offered the opportunity to do a colonoscopy while he is in the hospital (even though he had a colonoscopy less than a year ago).  He prefers to wait until he is scheduled to be off Brilinta in 90 days before he has the colonoscopy.  I recommend that he stop drinking alcohol.    I discussed the patient's findings and my recommendations with patient.         Chriss Alejo M.D.  [facility]  [address] [place] [address]  Office: [phone]

## 2023-10-28 NOTE — OUTREACH NOTE
Stroke Week 1 Survey      Flowsheet Row Responses   [facility] [place] facility patient discharged from? [facility]   Does the patient have one of the following disease processes/diagnoses(primary or secondary)? Stroke   Week 1 attempt successful? No   Unsuccessful attempts Attempt 3   Revoke Readmitted            ERICK MCDONALD - Registered Nurse

## 2023-10-28 NOTE — CONSULTS
CONSULT NOTE    INTERNAL MEDICINE   [facility]       Patient Identification:  Name: Flako Coreas  Age: 58 y.o.  Sex: male  :  1965  MRN: [id number]             Date of Consultation:  10/27/23          Primary Care Physician: Akash Rodriguez Jr., DO                               Requesting Physician: dr silva  Reason for Consultation admission     Chief Complaint:  58 year old gentleman presented to the emergency room with repeated falls, dizziness and ataxia; he was placed in the observation unit; he was seen by neurology and rehab was recommended; in addition the patient admitted to being a daily heavy drinker so the ciwa protocol was started; presently he is sleeping; no visitors are present; per staff the patient became anxious and tremulous prior to receiving ativan    History of Present Illness:   As above      Past Medical History:  Past Medical History:   Diagnosis Date    ADHD (attention deficit hyperactivity disorder)     On going issue    Anxiety     Lepe's esophagus     Benign prostatic hyperplasia Surgery in 2021    Clotting disorder Intestinal    Depression     Diverticulitis     Diverticulosis     Duodenitis     Enteritis     Failure to thrive (child)     Family history of blood clots     GERD (gastroesophageal reflux disease)     Hyperlipidemia     Hypertension     Hypertensive emergency     Low testosterone     Microcytosis     Pancreatitis     Pneumonia     PONV (postoperative nausea and vomiting)     Seasonal affective disorder     Shoulder injury     Stroke     Unexplained weight loss      Past Surgical History:  Past Surgical History:   Procedure Laterality Date    COLON SURGERY      COLONOSCOPY      COLONOSCOPY N/A 2022    Procedure: COLONOSCOPY INTO CECUM WITH HOT SNARE POLYPECTOMY;  Surgeon: Albert Gallo MD;  Location: [facility] ENDOSCOPY;  Service: Gastroenterology;  Laterality: N/A;  PRE- GI BLEED  POST- DIVERTICULOSIS, POLYP    ENDOSCOPY  N/A 12/10/2022    Procedure: ESOPHAGOGASTRODUODENOSCOPY;  Surgeon: Albert Gallo MD;  Location: [facility] ENDOSCOPY;  Service: Gastroenterology;  Laterality: N/A;  PRE- DARK STOOLS  POST- BARRETTS ESOPHAGUS    FRACTURE SURGERY  1980    for broken arm    FRACTURE SURGERY      for broken hand    INCISION AND DRAINAGE ABSCESS  2015    anal    PROSTATE SURGERY      SMALL INTESTINE SURGERY      TONSILLECTOMY        Home Meds:  Medications Prior to Admission   Medication Sig Dispense Refill Last Dose    amLODIPine (NORVASC) 10 MG tablet Take 0.5 tablets by mouth Daily.   10/26/2023    aspirin 81 MG EC tablet Take 1 tablet by mouth Daily. 90 tablet 1 10/26/2023    atorvastatin (LIPITOR) 80 MG tablet Take 1 tablet by mouth Every Night. 30 tablet 0 10/26/2023    carvedilol (COREG) 6.25 MG tablet Take 1 tablet by mouth 2 (Two) Times a Day With Meals. 180 tablet 1 10/26/2023    hydrALAZINE (APRESOLINE) 100 MG tablet Take 1 tablet by mouth Every 8 (Eight) Hours. 270 tablet 1 10/26/2023    hydroCHLOROthiazide (HYDRODIURIL) 12.5 MG tablet Take 1 tablet by mouth Daily. 30 tablet 1 10/26/2023    hydrOXYzine (ATARAX) 25 MG tablet Take 1 tablet by mouth 3 (Three) Times a Day As Needed for Anxiety. 90 tablet 0 10/26/2023    lisinopril (PRINIVIL,ZESTRIL) 40 MG tablet Take 1 tablet by mouth Daily. 90 tablet 1 10/26/2023    spironolactone (ALDACTONE) 25 MG tablet Take 1 tablet by mouth Daily. 90 tablet 1 10/26/2023    ticagrelor (BRILINTA) 60 MG tablet tablet Take 1 tablet by mouth 2 (Two) Times a Day for 30 days. 60 tablet 0 10/26/2023    vilazodone (Viibryd) 40 MG tablet tablet Take 1 tablet by mouth Daily. 90 tablet 1 10/26/2023    meclizine (ANTIVERT) 25 MG tablet         Current Meds:     Current Facility-Administered Medications:     acetaminophen (TYLENOL) tablet 650 mg, 650 mg, Oral, Q6H PRN, Mary Mccray, MOIZ    amLODIPine (NORVASC) tablet 5 mg, 5 mg, Oral, Daily, Mary Mccray PA-C, 5 mg at 10/27/23 2799     aspirin EC tablet 81 mg, 81 mg, Oral, Daily, Trinidad Mccrayitlin R, PA-C, 81 mg at 10/27/23 1330    atorvastatin (LIPITOR) tablet 80 mg, 80 mg, Oral, Nightly, Ann-Marie Mccraylin R, PA-C, 80 mg at 10/27/23 0058    sennosides-docusate (PERICOLACE) 8.6-50 MG per tablet 2 tablet, 2 tablet, Oral, BID, 2 tablet at 10/27/23 2216 **AND** polyethylene glycol (MIRALAX) packet 17 g, 17 g, Oral, Daily PRN **AND** bisacodyl (DULCOLAX) EC tablet 5 mg, 5 mg, Oral, Daily PRN **AND** bisacodyl (DULCOLAX) suppository 10 mg, 10 mg, Rectal, Daily PRN, Trinidad Mccrayitlin R, PA-C    carvedilol (COREG) tablet 6.25 mg, 6.25 mg, Oral, BID With Meals, Ann-Marie Mccraylin BERT, PA-C, 6.25 mg at 10/27/23 1332    folic acid (FOLVITE) tablet 1 mg, 1 mg, Oral, Daily, Ann-Marie Mccraylin R, PA-C    hydrALAZINE (APRESOLINE) tablet 100 mg, 100 mg, Oral, Q8H, Ann-Marie Mccraylin R, PA-C, 100 mg at 10/27/23 2216    hydrOXYzine (ATARAX) tablet 25 mg, 25 mg, Oral, TID PRN, Ann-Marie Mccraylin BERT, PA-C, 25 mg at 10/27/23 1358    influenza vac split quad (FLUZONE,FLUARIX,AFLURIA,FLULAVAL) injection 0.5 mL, 0.5 mL, Intramuscular, During Hospitalization, Mary Mccray PA-C    lidocaine 1% - EPINEPHrine 1:[id number] (XYLOCAINE W/EPI) 1 %-1:[id number] injection 10 mL, 10 mL, Injection, Once, Ann-Marie Mccraylin R, PA-C    lisinopril (PRINIVIL,ZESTRIL) tablet 40 mg, 40 mg, Oral, Daily, Ann-Marie Mccraylin R, PA-C, 40 mg at 10/27/23 0902    LORazepam (ATIVAN) tablet 1 mg, 1 mg, Oral, Q1H PRN **OR** LORazepam (ATIVAN) injection 1 mg, 1 mg, Intravenous, Q1H PRN **OR** LORazepam (ATIVAN) tablet 2 mg, 2 mg, Oral, Q1H PRN **OR** LORazepam (ATIVAN) injection 2 mg, 2 mg, Intravenous, Q1H PRN **OR** LORazepam (ATIVAN) injection 2 mg, 2 mg, Intravenous, Q15 Min PRN, 2 mg at 10/27/23 1721 **OR** LORazepam (ATIVAN) injection 2 mg, 2 mg, Intramuscular, Q15 Min PRN, Mary Mccray, PAEneidaC    Magnesium Standard Dose Replacement - Follow Nurse / BPA Driven Protocol, , Does not  apply, PRN, Mahendra, Mary R, PA-C    multivitamin with minerals 1 tablet, 1 tablet, Oral, Daily, Mahendra, Mary R, PA-C    sodium chloride 0.9 % flush 10 mL, 10 mL, Intravenous, PRN, Mahendra, Mary R, PA-C    sodium chloride 0.9 % flush 10 mL, 10 mL, Intravenous, Q12H, Mahendra, Mary R, PA-C, 10 mL at 10/27/23 2100    sodium chloride 0.9 % flush 10 mL, 10 mL, Intravenous, PRN, Mahendra, Mary R, PA-C    sodium chloride 0.9 % infusion 40 mL, 40 mL, Intravenous, PRN, Mahendra, Mary R, PA-C    sodium chloride 0.9 % with KCl 20 mEq/L infusion, 100 mL/hr, Intravenous, Continuous, Stingl, Xena Garcia MD, Last Rate: 100 mL/hr at 10/27/23 2216, 100 mL/hr at 10/27/23 2216    thiamine (B-1) injection 200 mg, 200 mg, Intravenous, Q8H **FOLLOWED BY** [START ON 11/2/2023] thiamine (VITAMIN B-1) tablet 100 mg, 100 mg, Oral, Daily, Mahendra, Mary R, PA-C    ticagrelor (BRILINTA) tablet 60 mg, 60 mg, Oral, BID, Mahendra, Mary R, PA-C    vilazodone (VIIBRYD) tablet 40 mg, 40 mg, Oral, Daily, Mahendra, Mary R, PA-C, 40 mg at 10/27/23 0902  Allergies:  No Known Allergies  Social History:   Social History     Socioeconomic History    Marital status: Single   Tobacco Use    Smoking status: Never    Smokeless tobacco: Never   Vaping Use    Vaping Use: Never used   Substance and Sexual Activity    Alcohol use: Yes     Alcohol/week: 70.0 standard drinks of alcohol     Types: 70 Shots of liquor per week     Comment: patient states 9-10 drinks daily    Drug use: Yes     Frequency: 1.0 times per week     Types: Marijuana     Comment: patient states he has been smoking marijuania daily x 2 weeks    Sexual activity: Defer     Partners: Female     Family History:  Family History   Problem Relation Age of Onset    Stroke Mother     Stroke Father           Review of Systems  Not obtainable.      Vitals:   /67 (BP Location: Right arm, Patient Position: Lying)   Pulse 53   Temp 97.9 °F (36.6 °C) (Oral)   " Resp 16   Ht 188 cm (74\")   Wt 105 kg (231 lb)   SpO2 91%   BMI 29.66 kg/m²   I/O:   Intake/Output Summary (Last 24 hours) at 10/27/2023 2320  Last data filed at 10/27/2023 0819  Gross per 24 hour   Intake --   Output 400 ml   Net -400 ml     Exam:  General Appearance:    somnolent, no distress, appears stated age   Head:    Normocephalic, without obvious abnormality, atraumatic   Eyes:     both eyes closed   Ears:    Normal external ear canals, both ears   Nose:   Nares normal, septum midline, mucosa normal, no drainage    or sinus tenderness   Throat:   Lips, tongue, gums normal; oral mucosa pink and moist   Neck:   Supple, symmetrical, trachea midline, no adenopathy;     thyroid:  no enlargement/tenderness/nodules; no carotid    bruit or JVD   Back:     Symmetric, no curvature, ROM normal, no CVA tenderness   Lungs:     Clear to auscultation bilaterally, respirations unlabored   Chest Wall:    No tenderness or deformity    Heart:    Regular rate and rhythm, S1 and S2 normal, no murmur, rub   or gallop   Abdomen:     Soft, nontender, bowel sounds active all four quadrants,     no masses, no hepatomegaly, no splenomegaly   Extremities:   Extremities normal, atraumatic, no cyanosis or edema                Data Review:  Labs in chart were reviewed.  WBC   Date Value Ref Range Status   10/26/2023 9.71 3.40 - 10.80 10*3/mm3 Final     Hemoglobin   Date Value Ref Range Status   10/26/2023 14.5 13.0 - 17.7 g/dL Final     Hematocrit   Date Value Ref Range Status   10/26/2023 42.7 37.5 - 51.0 % Final     Platelets   Date Value Ref Range Status   10/26/2023 328 140 - 450 10*3/mm3 Final     Sodium   Date Value Ref Range Status   10/27/2023 144 136 - 145 mmol/L Final     Potassium   Date Value Ref Range Status   10/27/2023 3.8 3.5 - 5.2 mmol/L Final     Chloride   Date Value Ref Range Status   10/27/2023 109 (H) 98 - 107 mmol/L Final     CO2   Date Value Ref Range Status   10/27/2023 24.2 22.0 - 29.0 mmol/L Final     BUN "   Date Value Ref Range Status   10/27/2023 21 (H) 6 - 20 mg/dL Final     Creatinine   Date Value Ref Range Status   10/27/2023 1.06 0.76 - 1.27 mg/dL Final     Glucose   Date Value Ref Range Status   10/27/2023 95 65 - 99 mg/dL Final     Calcium   Date Value Ref Range Status   10/27/2023 8.7 8.6 - 10.5 mg/dL Final         Results from last 7 days   Lab Units 10/24/23  0637   HEMOGLOBIN A1C % 5.70*       Imaging Results (Last 7 Days)       Procedure Component Value Units Date/Time    MRI Brain Without Contrast [[id number]] Collected: 10/27/23 0058     Updated: 10/27/23 0058    Narrative:        Patient: NIMESH ROCHA  Time Out: 00:57  Exam(s): MRI HEAD Without Contrast     EXAM:    MR Head Without Intravenous Contrast    CLINICAL HISTORY:     Reason for exam: Dizziness and fall and recently had a acute infarct.    TECHNIQUE:    Magnetic resonance images of the head brain without intravenous   contrast in multiple planes.    COMPARISON:    MRI brain from October 23, 2023 as well as CT angiogram and CT   perfusion from that date.  Prior MRI brain from September 2, 2023.    FINDINGS:    Brain:  5 mm focus of T2 shine through on the DWI images in the left   cerebellum consistent with old lacunar infarct is less prominent than on   the comparison study.  No acute infarcts are identified.  Mild patchy   periventricular and deep matter T2 hyperintensities consistent with   chronic small vessel disease.  There is no mass lesion or midline shift.    No hemorrhage.    Ventricles:  Unremarkable.  No ventriculomegaly.    Bones joints:  Unremarkable.    Sinuses:  Trace amount of mucosal thickening in the left maxillary   sinus and ethmoid air cells.  No gas fluid levels are seen.    Mastoid air cells:  Unremarkable as visualized.  No mastoid effusion.    Orbits:  Unremarkable as visualized.    IMPRESSION:         5 mm focus of T2 shine through on the DWI images in the left cerebellum   consistent with old lacunar infarct is less  prominent than on the   comparison study.  No acute infarcts are identified.  Mild scattered T2   hyperintensities consistent with chronic small vessel disease, unchanged.      Impression:          Electronically signed by Antonio More MD on 10-27-23 at 0057    CT Head Without Contrast Stroke Protocol [[id number]] Collected: 10/26/23 2119     Updated: 10/26/23 2125    Narrative:      HEAD CT     HISTORY: Fall with a laceration to the forehead.     TECHNIQUE: Noncontrast head CT is provided. Comparison: Head CT August 21, 2023.     FINDINGS: The ventricles are normal in caliber. The brain parenchyma and  extra-axial spaces appear normal. The recently demonstrated subacute  left cerebellar infarction seen on MRI is not evident on CT. There is no  hemorrhage or acute ischemic change. The bones of the skull appear  normal. The mastoid air cells, middle ears, and visualized paranasal  sinuses are clear.     There is wet bandage material over the anterior frontal scalp compatible  with the reported history of laceration. No soft tissue gas or  radiopaque foreign body is present, however.       Impression:      Negative head CT.     Radiation dose reduction techniques were utilized, including automated  exposure control and exposure modulation based on body size.        This report was finalized on 10/26/2023 9:22 PM by Dr. Pk Oropeza M.D on Workstation: [id number]       XR Chest 1 View [[id number]] Collected: 10/26/23 2110     Updated: 10/26/23 2114    Narrative:      PORTABLE CHEST X-RAY     HISTORY: Dizziness, acute stroke protocol.     Portable chest x-ray is provided. Correlation: October 23, 2023.     FINDINGS: The cardiomediastinal silhouette is normal. The lungs are  clear. The costophrenic sulci are dry and the bones appear normal. There  is no pneumothorax.       Impression:      Negative.     This report was finalized on 10/26/2023 9:11 PM by Dr. Pk Oropeza M.D on Workstation: [id number]              Past Medical History:   Diagnosis Date    ADHD (attention deficit hyperactivity disorder)     On going issue    Anxiety     Lepe's esophagus     Benign prostatic hyperplasia Surgery in 12/2021    Clotting disorder Intestinal    Depression     Diverticulitis     Diverticulosis     Duodenitis     Enteritis     Failure to thrive (child)     Family history of blood clots     GERD (gastroesophageal reflux disease)     Hyperlipidemia     Hypertension     Hypertensive emergency     Low testosterone     Microcytosis     Pancreatitis     Pneumonia     PONV (postoperative nausea and vomiting)     Seasonal affective disorder     Shoulder injury     Stroke     Unexplained weight loss        Assessment:  Active Hospital Problems    Diagnosis  POA    Concussion [S06.0XAA]  Yes    Fall [W19.XXXA]  Yes    Head injury, closed, with concussion [S06.0XAA]  Yes      Resolved Hospital Problems    Diagnosis Date Resolved POA    **TIA (transient ischemic attack) [G45.9] 10/27/2023 Yes   Ataxia  Alcohol dependence with withdrawal  Hypertension  dizziness    Plan:  Ciwa protocol  Trend labs  Therapy evaluation once able to participate  Monitor on telemetry  Dw patient and provider from the observation unit    Xena Cooper MD   10/27/2023  23:20 EDT

## 2023-10-28 NOTE — THERAPY TREATMENT NOTE
Patient Name: Flako Coreas  : 1965    MRN: [id number]                              Today's Date: 10/28/2023       Admit Date: 10/26/2023    Visit Dx:     ICD-10-CM ICD-9-CM   1. Vertigo  R42 780.4   2. Closed head injury, initial encounter  S09.90XA 959.01   3. Laceration of forehead, initial encounter  S01.81XA 873.42   4. Elevated serum creatinine  R79.89 790.99   5. 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    Acute CVA (cerebrovascular accident)    HTN (hypertension)    Late effect of cerebrovascular accident (CVA)    Concussion    Fall    Head injury, closed, with concussion    Alcohol abuse     Past Medical History:   Diagnosis Date    ADHD (attention deficit hyperactivity disorder)     On going issue    Anxiety     Lepe's esophagus     Benign prostatic hyperplasia Surgery in 2021    Clotting disorder Intestinal    Depression     Diverticulitis     Diverticulosis     Duodenitis     Enteritis     Failure to thrive (child)     Family history of blood clots     GERD (gastroesophageal reflux disease)     Hyperlipidemia     Hypertension     Hypertensive emergency     Low testosterone     Microcytosis     Pancreatitis     Pneumonia     PONV (postoperative nausea and vomiting)     Seasonal affective disorder     Shoulder injury     Stroke     Unexplained weight loss      Past Surgical History:   Procedure Laterality Date    COLON SURGERY      COLONOSCOPY      COLONOSCOPY N/A 2022    Procedure: COLONOSCOPY INTO CECUM WITH HOT SNARE POLYPECTOMY;  Surgeon: Sabino  Albert SOUZA MD;  Location: [facility] ENDOSCOPY;  Service: Gastroenterology;  Laterality: N/A;  PRE- GI BLEED  POST- DIVERTICULOSIS, POLYP    ENDOSCOPY N/A 12/10/2022    Procedure: ESOPHAGOGASTRODUODENOSCOPY;  Surgeon: Albert Gallo MD;  Location: [facility] ENDOSCOPY;  Service: Gastroenterology;  Laterality: N/A;  PRE- DARK STOOLS  POST- BARRETTS ESOPHAGUS    FRACTURE SURGERY  1980    for broken arm    FRACTURE SURGERY      for broken hand    INCISION AND DRAINAGE ABSCESS  2015    anal    PROSTATE SURGERY      SMALL INTESTINE SURGERY      TONSILLECTOMY        General Information       Row Name 10/28/23 1552          Physical Therapy Time and Intention    Document Type therapy note (daily note)  -LW     Mode of Treatment individual therapy;physical therapy  -       Row Name 10/28/23 1552          General Information    Patient Profile Reviewed yes  -LW     Existing Precautions/Restrictions fall  -       Row Name 10/28/23 1552          Cognition    Orientation Status (Cognition) oriented x 4  -       Row Name 10/28/23 1552          Safety Issues, Functional Mobility    Impairments Affecting Function (Mobility) balance;coordination;endurance/activity tolerance;strength;pain  -               User Key  (r) = Recorded By, (t) = Taken By, (c) = Cosigned By      Initials Name Provider Type    LW Margo Mario PT Physical Therapist                   Mobility       Row Name 10/28/23 1553          Bed Mobility    Bed Mobility bed mobility (all) activities  -     All Activities, George (Bed Mobility) standby assist  -       Row Name 10/28/23 1553          Sit-Stand Transfer    Sit-Stand George (Transfers) contact guard  -     Assistive Device (Sit-Stand Transfers) walker, front-wheeled  -       Row Name 10/28/23 1553          Gait/Stairs (Locomotion)    George Level (Gait) contact guard;minimum assist (75% patient effort);verbal cues  -     Assistive Device (Gait) walker, front-wheeled  -      Distance in Feet (Gait) 80'x2 with standing rest break  -LW     Deviations/Abnormal Patterns (Gait) stride length decreased;base of support, narrow;ataxic  -LW     Comment, (Gait/Stairs) used walker today d/t pt feeling unsteady, improved gait with VC to keep feet apart for wider RENÉ  -LW               User Key  (r) = Recorded By, (t) = Taken By, (c) = Cosigned By      Initials Name Provider Type    Margo Gomez PT Physical Therapist                   Obj/Interventions       Row Name 10/28/23 1554          Balance    Balance Assessment sitting static balance;sitting dynamic balance;standing static balance;standing dynamic balance  -LW     Static Sitting Balance standby assist  -LW     Dynamic Sitting Balance standby assist  -LW     Position, Sitting Balance sitting edge of bed;unsupported  -LW     Static Standing Balance contact guard  -LW     Dynamic Standing Balance contact guard;minimal assist  -LW     Position/Device Used, Standing Balance walker, front-wheeled;supported  -LW     Balance Interventions sitting;standing;sit to stand;supported;static;dynamic  -LW               User Key  (r) = Recorded By, (t) = Taken By, (c) = Cosigned By      Initials Name Provider Type    Margo Gomez PT Physical Therapist                   Goals/Plan    No documentation.                  Clinical Impression       Row Name 10/28/23 1556          Pain    Pretreatment Pain Rating 0/10 - no pain  -LW     Posttreatment Pain Rating 0/10 - no pain  -LW       Row Name 10/28/23 1550          Plan of Care Review    Plan of Care Reviewed With patient  -LW     Progress improving  -LW     Outcome Evaluation Patient was agreeable to working with PT. He was SBA for bed mobility and CGA to stand. He ambulated 80'x2 with standing rest break. He used the RW today d/t feeling more unsteady and required CGA/Natasha. He occasionally did not get full clearence of his L foot and required VC to widen his RENÉ. Patient will benefit from continued  skilled PT addressing limitations in functional mobiltiy to maximize safety and independence.  -LW       Row Name 10/28/23 1550          Positioning and Restraints    Pre-Treatment Position in bed  -LW     Post Treatment Position bed  -LW     In Bed supine;call light within reach;encouraged to call for assist;exit alarm on  -LW               User Key  (r) = Recorded By, (t) = Taken By, (c) = Cosigned By      Initials Name Provider Type    Margo Gomez PT Physical Therapist                   Outcome Measures       Row Name 10/28/23 5729          How much help from another person do you currently need...    Turning from your back to your side while in flat bed without using bedrails? 4  -LW     Moving from lying on back to sitting on the side of a flat bed without bedrails? 3  -LW     Moving to and from a bed to a chair (including a wheelchair)? 3  -LW     Standing up from a chair using your arms (e.g., wheelchair, bedside chair)? 3  -LW     Climbing 3-5 steps with a railing? 3  -LW     To walk in hospital room? 3  -LW     AM-PAC 6 Clicks Score (PT) 19  -LW     Highest level of mobility 6 --> Walked 10 steps or more  -LW               User Key  (r) = Recorded By, (t) = Taken By, (c) = Cosigned By      Initials Name Provider Type    Margo Gomez PT Physical Therapist                                 Physical Therapy Education       Title: PT OT SLP Therapies (In Progress)       Topic: Physical Therapy (In Progress)       Point: Mobility training (Done)       Learning Progress Summary             Patient Acceptance, E,D, VU,NR by LW at 10/28/2023 1558    Acceptance, E, VU,NR by EM at 10/27/2023 1627                         Point: Home exercise program (Not Started)       Learner Progress:  Not documented in this visit.              Point: Body mechanics (Not Started)       Learner Progress:  Not documented in this visit.              Point: Precautions (Not Started)       Learner Progress:  Not documented in this  visit.                              User Key       Initials Effective Dates Name Provider Type Discipline    EM 06/16/21 -  Viviane Rivas PT Physical Therapist PT    LW 05/08/23 -  Margo Mario PT Physical Therapist PT                  PT Recommendation and Plan     Plan of Care Reviewed With: patient  Progress: improving  Outcome Evaluation: Patient was agreeable to working with PT. He was SBA for bed mobility and CGA to stand. He ambulated 80'x2 with standing rest break. He used the RW today d/t feeling more unsteady and required CGA/Natasha. He occasionally did not get full clearence of his L foot and required VC to widen his RENÉ. Patient will benefit from continued skilled PT addressing limitations in functional mobiltiy to maximize safety and independence.     Time Calculation:         PT Charges       Row Name 10/28/23 1558             Time Calculation    Start Time 1432  -LW      Stop Time 1444  -LW      Time Calculation (min) 12 min  -LW      PT Received On 10/28/23  -LW      PT - Next Appointment 10/29/23  -LW         Time Calculation- PT    Total Timed Code Minutes- PT 12 minute(s)  -LW         Timed Charges    81797 - PT Therapeutic Activity Minutes 12  -LW         Total Minutes    Timed Charges Total Minutes 12  -LW       Total Minutes 12  -LW                User Key  (r) = Recorded By, (t) = Taken By, (c) = Cosigned By      Initials Name Provider Type    Margo Gomez PT Physical Therapist                  Therapy Charges for Today       Code Description Service Date Service Provider Modifiers Qty    [id number]  PT THERAPEUTIC ACT EA 15 MIN 10/28/2023 Margo Mario, PT GP 1            PT G-Codes  Outcome Measure Options: Modified [place]  AM-PAC 6 Clicks Score (PT): 19  AM-PAC 6 Clicks Score (OT): 19  Modified [place] Scale: 4 - Moderately severe disability.  Unable to walk without assistance, and unable to attend to own bodily needs without assistance.       Margo Mario PT  10/28/2023

## 2023-10-29 LAB
ANION GAP SERPL CALCULATED.3IONS-SCNC: 9 MMOL/L (ref 5–15)
BASOPHILS # BLD AUTO: 0.04 10*3/MM3 (ref 0–0.2)
BASOPHILS NFR BLD AUTO: 0.6 % (ref 0–1.5)
BUN SERPL-MCNC: 16 MG/DL (ref 6–20)
BUN/CREAT SERPL: 16.5 (ref 7–25)
CALCIUM SPEC-SCNC: 8.3 MG/DL (ref 8.6–10.5)
CHLORIDE SERPL-SCNC: 112 MMOL/L (ref 98–107)
CO2 SERPL-SCNC: 20 MMOL/L (ref 22–29)
CREAT SERPL-MCNC: 0.97 MG/DL (ref 0.76–1.27)
DEPRECATED RDW RBC AUTO: 47.5 FL (ref 37–54)
EGFRCR SERPLBLD CKD-EPI 2021: 91.6 ML/MIN/1.73
EOSINOPHIL # BLD AUTO: 0.48 10*3/MM3 (ref 0–0.4)
EOSINOPHIL NFR BLD AUTO: 6.9 % (ref 0.3–6.2)
ERYTHROCYTE [DISTWIDTH] IN BLOOD BY AUTOMATED COUNT: 14.7 % (ref 12.3–15.4)
GLUCOSE SERPL-MCNC: 96 MG/DL (ref 65–99)
HCT VFR BLD AUTO: 35 % (ref 37.5–51)
HCT VFR BLD AUTO: 35.7 % (ref 37.5–51)
HCT VFR BLD AUTO: 38.2 % (ref 37.5–51)
HCT VFR BLD AUTO: 38.2 % (ref 37.5–51)
HGB BLD-MCNC: 11.5 G/DL (ref 13–17.7)
HGB BLD-MCNC: 11.7 G/DL (ref 13–17.7)
HGB BLD-MCNC: 12.1 G/DL (ref 13–17.7)
HGB BLD-MCNC: 12.1 G/DL (ref 13–17.7)
IMM GRANULOCYTES # BLD AUTO: 0.01 10*3/MM3 (ref 0–0.05)
IMM GRANULOCYTES NFR BLD AUTO: 0.1 % (ref 0–0.5)
LYMPHOCYTES # BLD AUTO: 1.86 10*3/MM3 (ref 0.7–3.1)
LYMPHOCYTES NFR BLD AUTO: 26.8 % (ref 19.6–45.3)
MCH RBC QN AUTO: 27.6 PG (ref 26.6–33)
MCHC RBC AUTO-ENTMCNC: 31.7 G/DL (ref 31.5–35.7)
MCV RBC AUTO: 87 FL (ref 79–97)
MONOCYTES # BLD AUTO: 0.63 10*3/MM3 (ref 0.1–0.9)
MONOCYTES NFR BLD AUTO: 9.1 % (ref 5–12)
NEUTROPHILS NFR BLD AUTO: 3.93 10*3/MM3 (ref 1.7–7)
NEUTROPHILS NFR BLD AUTO: 56.5 % (ref 42.7–76)
NRBC BLD AUTO-RTO: 0 /100 WBC (ref 0–0.2)
PLATELET # BLD AUTO: 204 10*3/MM3 (ref 140–450)
PMV BLD AUTO: 10.5 FL (ref 6–12)
POTASSIUM SERPL-SCNC: 4.2 MMOL/L (ref 3.5–5.2)
QT INTERVAL: 429 MS
QTC INTERVAL: 489 MS
RBC # BLD AUTO: 4.39 10*6/MM3 (ref 4.14–5.8)
SODIUM SERPL-SCNC: 141 MMOL/L (ref 136–145)
WBC NRBC COR # BLD: 6.95 10*3/MM3 (ref 3.4–10.8)

## 2023-10-29 PROCEDURE — 25010000002 THIAMINE PER 100 MG: Performed by: STUDENT IN AN ORGANIZED HEALTH CARE EDUCATION/TRAINING PROGRAM

## 2023-10-29 PROCEDURE — 85025 COMPLETE CBC W/AUTO DIFF WBC: CPT | Performed by: INTERNAL MEDICINE

## 2023-10-29 PROCEDURE — 80048 BASIC METABOLIC PNL TOTAL CA: CPT | Performed by: INTERNAL MEDICINE

## 2023-10-29 PROCEDURE — 85014 HEMATOCRIT: CPT | Performed by: NURSE PRACTITIONER

## 2023-10-29 PROCEDURE — 99232 SBSQ HOSP IP/OBS MODERATE 35: CPT | Performed by: NURSE PRACTITIONER

## 2023-10-29 PROCEDURE — G0378 HOSPITAL OBSERVATION PER HR: HCPCS

## 2023-10-29 PROCEDURE — 93005 ELECTROCARDIOGRAM TRACING: CPT | Performed by: INTERNAL MEDICINE

## 2023-10-29 PROCEDURE — 93010 ELECTROCARDIOGRAM REPORT: CPT | Performed by: STUDENT IN AN ORGANIZED HEALTH CARE EDUCATION/TRAINING PROGRAM

## 2023-10-29 PROCEDURE — 25010000002 LORAZEPAM PER 2 MG: Performed by: STUDENT IN AN ORGANIZED HEALTH CARE EDUCATION/TRAINING PROGRAM

## 2023-10-29 PROCEDURE — 85018 HEMOGLOBIN: CPT | Performed by: NURSE PRACTITIONER

## 2023-10-29 PROCEDURE — 96376 TX/PRO/DX INJ SAME DRUG ADON: CPT

## 2023-10-29 RX ADMIN — LORAZEPAM 2 MG: 1 TABLET ORAL at 21:02

## 2023-10-29 RX ADMIN — TICAGRELOR 60 MG: 60 TABLET ORAL at 21:03

## 2023-10-29 RX ADMIN — CARVEDILOL 6.25 MG: 6.25 TABLET, FILM COATED ORAL at 09:22

## 2023-10-29 RX ADMIN — LORAZEPAM 2 MG: 2 INJECTION INTRAMUSCULAR; INTRAVENOUS at 04:42

## 2023-10-29 RX ADMIN — HYDRALAZINE HYDROCHLORIDE 100 MG: 50 TABLET ORAL at 21:00

## 2023-10-29 RX ADMIN — THIAMINE HYDROCHLORIDE 200 MG: 100 INJECTION, SOLUTION INTRAMUSCULAR; INTRAVENOUS at 09:29

## 2023-10-29 RX ADMIN — ASPIRIN 81 MG: 81 TABLET, COATED ORAL at 09:31

## 2023-10-29 RX ADMIN — LORAZEPAM 1 MG: 2 INJECTION INTRAMUSCULAR; INTRAVENOUS at 16:44

## 2023-10-29 RX ADMIN — LISINOPRIL 40 MG: 20 TABLET ORAL at 09:23

## 2023-10-29 RX ADMIN — THIAMINE HYDROCHLORIDE 200 MG: 100 INJECTION, SOLUTION INTRAMUSCULAR; INTRAVENOUS at 15:29

## 2023-10-29 RX ADMIN — VILAZODONE HYDROCHLORIDE 40 MG: 40 TABLET, FILM COATED ORAL at 09:23

## 2023-10-29 RX ADMIN — PANTOPRAZOLE SODIUM 40 MG: 40 TABLET, DELAYED RELEASE ORAL at 04:42

## 2023-10-29 RX ADMIN — Medication 10 ML: at 09:24

## 2023-10-29 RX ADMIN — HYDRALAZINE HYDROCHLORIDE 100 MG: 50 TABLET ORAL at 15:29

## 2023-10-29 RX ADMIN — LORAZEPAM 2 MG: 2 INJECTION INTRAMUSCULAR; INTRAVENOUS at 18:27

## 2023-10-29 RX ADMIN — AMLODIPINE BESYLATE 5 MG: 5 TABLET ORAL at 09:23

## 2023-10-29 RX ADMIN — ACETAMINOPHEN 650 MG: 325 TABLET, FILM COATED ORAL at 04:42

## 2023-10-29 RX ADMIN — THIAMINE HYDROCHLORIDE 200 MG: 100 INJECTION, SOLUTION INTRAMUSCULAR; INTRAVENOUS at 21:00

## 2023-10-29 RX ADMIN — LORAZEPAM 2 MG: 2 INJECTION INTRAMUSCULAR; INTRAVENOUS at 09:30

## 2023-10-29 RX ADMIN — MULTIPLE VITAMINS W/ MINERALS TAB 1 TABLET: TAB at 09:23

## 2023-10-29 RX ADMIN — ATORVASTATIN CALCIUM 80 MG: 80 TABLET, FILM COATED ORAL at 21:00

## 2023-10-29 RX ADMIN — FOLIC ACID 1 MG: 1 TABLET ORAL at 09:23

## 2023-10-29 RX ADMIN — TICAGRELOR 60 MG: 60 TABLET ORAL at 09:22

## 2023-10-29 NOTE — PLAN OF CARE
Problem: Adult Inpatient Plan of Care  Goal: Absence of Hospital-Acquired Illness or Injury  Intervention: Identify and Manage Fall Risk  Recent Flowsheet Documentation  Taken 10/28/2023 1944 by Lisa Duncan, RN  Safety Promotion/Fall Prevention:   activity supervised   assistive device/personal items within reach   clutter free environment maintained   safety round/check completed   nonskid shoes/slippers when out of bed  Intervention: Prevent Skin Injury  Recent Flowsheet Documentation  Taken 10/28/2023 1944 by Lisa Duncan, RN  Body Position: position changed independently  Goal: Optimal Comfort and Wellbeing  Intervention: Monitor Pain and Promote Comfort  Recent Flowsheet Documentation  Taken 10/28/2023 1944 by Lisa Duncan, RN  Pain Management Interventions: relaxation techniques promoted   Goal Outcome Evaluation:

## 2023-10-29 NOTE — PROGRESS NOTES
Name: Flako Coreas ADMIT: 10/26/2023   : 1965  PCP: Akash Rodriguez Jr., DO    MRN: [id number] LOS: 0 days   AGE/SEX: 58 y.o. male  ROOM: [facility]     Subjective   Subjective     Patient is lying on the bed and does not appear to be in major distress.  Denies nausea, vomiting abdominal pain, chest pain.       Objective   Objective   Vital Signs  Temp:  [98.4 °F (36.9 °C)-99 °F (37.2 °C)] 98.4 °F (36.9 °C)  Heart Rate:  [58-83] 62  Resp:  [16-18] 18  BP: (100-158)/(63-87) 158/87  SpO2:  [94 %-98 %] 97 %  on   ;   Device (Oxygen Therapy): room air  Body mass index is 29.66 kg/m².  Physical Exam  HEENT: PERRLA, extract movements intact, Scleras no icterus  Neck: Supple, no JVD  Cardiovascular: Regular rate and rhythm with normal S1 and S2  Respiratory: Fairly clear to auscultation bilaterally with no wheezes  GI: Soft, nontender, bowel sounds are present  Extremities: No edema, palpable pedal pulses  Neurologic: Grossly nonfocal, no facial asymmetry      Results Review     I reviewed the patient's new clinical results.  Results from last 7 days   Lab Units 10/29/23  1439 10/29/23  0424 10/28/23  2051 10/28/23  1030 10/26/23  2032 10/23/23  1909   WBC 10*3/mm3  --  6.95  --  7.61 9.71 8.87   HEMOGLOBIN g/dL 11.5* 12.1*  12.1* 12.0* 12.9* 14.5 13.1   PLATELETS 10*3/mm3  --  204  --  201 328 269     Results from last 7 days   Lab Units 10/29/23  0424 10/27/23  0421 10/26/23  2014 10/23/23  1909   SODIUM mmol/L 141 144 140 143   POTASSIUM mmol/L 4.2 3.8 3.7 3.6   CHLORIDE mmol/L 112* 109* 102 105   CO2 mmol/L 20.0* 24.2 19.0* 29.0   BUN mg/dL 16 21* 20 20   CREATININE mg/dL 0.97 1.06 1.46* 1.19   GLUCOSE mg/dL 96 95 127* 131*   EGFR mL/min/1.73 91.6 82.4 56.1* 71.7     Results from last 7 days   Lab Units 10/26/23  2014 10/23/23  1909   ALBUMIN g/dL 4.9 4.3   BILIRUBIN mg/dL 0.7 0.3   ALK PHOS U/L 73 68   AST (SGOT) U/L 24 23   ALT (SGPT) U/L 42* 29     Results from last 7 days   Lab Units  10/29/23  0424 10/27/23  0421 10/26/23  2014 10/23/23  1909   CALCIUM mg/dL 8.3* 8.7 9.7 9.3   ALBUMIN g/dL  --   --  4.9 4.3       Glucose   Date/Time Value Ref Range Status   10/26/2023 2033 120 70 - 130 mg/dL Final       No radiology results for the last day    I have personally reviewed all medications:  Scheduled Medications  amLODIPine, 5 mg, Oral, Daily  aspirin, 81 mg, Oral, Daily  atorvastatin, 80 mg, Oral, Nightly  carvedilol, 6.25 mg, Oral, BID With Meals  folic acid, 1 mg, Oral, Daily  hydrALAZINE, 100 mg, Oral, Q8H  lidocaine 1% - EPINEPHrine 1:840008, 10 mL, Injection, Once  lisinopril, 40 mg, Oral, Daily  multivitamin with minerals, 1 tablet, Oral, Daily  pantoprazole, 40 mg, Oral, Q AM  senna-docusate sodium, 2 tablet, Oral, BID  sodium chloride, 10 mL, Intravenous, Q12H  thiamine (B-1) IV, 200 mg, Intravenous, Q8H   Followed by  [START ON 11/2/2023] thiamine, 100 mg, Oral, Daily  ticagrelor, 60 mg, Oral, BID  vilazodone, 40 mg, Oral, Daily    Infusions  sodium chloride 0.9 % with KCl 20 mEq, 100 mL/hr, Last Rate: 100 mL/hr (10/29/23 1530)    Diet  Diet: Cardiac Diets; Healthy Heart (2-3 Na+); Texture: Regular Texture (IDDSI 7); Fluid Consistency: Thin (IDDSI 0)    I have personally reviewed:  [x]  Laboratory   [x]  Microbiology   [x]  Radiology   [x]  EKG/Telemetry  [x]  Cardiology/Vascular   []  Pathology    []  Records       Assessment/Plan     Active Hospital Problems    Diagnosis  POA    **Vertigo [R42]  Yes    Alcohol abuse [F10.10]  Unknown    Concussion [S06.0XAA]  Yes    Fall [W19.XXXA]  Yes    Head injury, closed, with concussion [S06.0XAA]  Yes    HTN (hypertension) [I10]  Yes    CVA (cerebral vascular accident) [I63.9]  Yes    Mixed hyperlipidemia [E78.2]  Yes      Resolved Hospital Problems    Diagnosis Date Resolved POA    TIA (transient ischemic attack) [G45.9] 10/27/2023 Yes       58 y.o. male admitted with Vertigo.    1.Head injury/concussion secondary to vertigo from left cerebellar  PICA severe stenosis, neurology did evaluate and MRI during this admission did not reveal any new acute infarcts or subacute infarcts.  Recommended SNF.  Continue with aspirin, Brilinta and statins.    2.  Subacute left cerebellar CVA diagnosed on 10/23/2023, treatment as mentioned above.    3.  Rectal bleeding, GI did evaluate today and offered further work-up however patient opted to postpone work-up until he can come off Brilinta which is 90 days from start date.    4.  Hypertension, on amlodipine, Coreg, hydralazine and monitor blood pressure closely.    5.  Ethanol abuse, will monitor for withdrawal and is on a CIWA protocol.  Continue with folate and thiamine supplements.  He was counseled extensively to quit drinking alcohol.  Receiving Ativan as needed.    6.  On SCDs for DVT prophylaxis.    7.  CODE STATUS is full code.    8.  Estimated discharge date, to rehab once a bed is available.    Copied text on this note has been reviewed by me on 10/29/2023      Mann Briseno MD  [facility]  10/29/23  15:42 EDT

## 2023-10-29 NOTE — PROGRESS NOTES
Gastroenterology   Inpatient Progress Note    Reason for Follow Up: Rectal bleeding  Subjective  Interval History:   Patient reports rectal bleeding with bowel movement last night, he states it is a stable amount.  He denies abdominal pain, nausea, vomiting.    Current Facility-Administered Medications:     acetaminophen (TYLENOL) tablet 650 mg, 650 mg, Oral, Q6H PRN, Mahendra, Mary R, PA-C, 650 mg at 10/29/23 0442    amLODIPine (NORVASC) tablet 5 mg, 5 mg, Oral, Daily, Mahendra, Mary R, PA-C, 5 mg at 10/29/23 0923    aspirin EC tablet 81 mg, 81 mg, Oral, Daily, Mahendra, Mary R, PA-C, 81 mg at 10/29/23 0931    atorvastatin (LIPITOR) tablet 80 mg, 80 mg, Oral, Nightly, Mahendra, Mary R, PA-C, 80 mg at 10/28/23 1953    sennosides-docusate (PERICOLACE) 8.6-50 MG per tablet 2 tablet, 2 tablet, Oral, BID, 2 tablet at 10/28/23 0852 **AND** polyethylene glycol (MIRALAX) packet 17 g, 17 g, Oral, Daily PRN **AND** bisacodyl (DULCOLAX) EC tablet 5 mg, 5 mg, Oral, Daily PRN **AND** bisacodyl (DULCOLAX) suppository 10 mg, 10 mg, Rectal, Daily PRN, Mahendra, Mary R, PA-C    carvedilol (COREG) tablet 6.25 mg, 6.25 mg, Oral, BID With Meals, Mahendra, Mary R, PA-C, 6.25 mg at 10/29/23 0922    folic acid (FOLVITE) tablet 1 mg, 1 mg, Oral, Daily, Mahendra, Mary R, PA-C, 1 mg at 10/29/23 0923    hydrALAZINE (APRESOLINE) tablet 100 mg, 100 mg, Oral, Q8H, Mahendra, Mary R, PA-C, 100 mg at 10/28/23 2135    hydrOXYzine (ATARAX) tablet 25 mg, 25 mg, Oral, TID PRN, Mahendra, Mary R, PA-C, 25 mg at 10/28/23 2247    influenza vac split quad (FLUZONE,FLUARIX,AFLURIA,FLULAVAL) injection 0.5 mL, 0.5 mL, Intramuscular, During Hospitalization, Mary Mccray PA-C    lidocaine 1% - EPINEPHrine 1:066590 (XYLOCAINE W/EPI) 1 %-1:862133 injection 10 mL, 10 mL, Injection, Once, Mary Mccray PA-C    lisinopril (PRINIVIL,ZESTRIL) tablet 40 mg, 40 mg, Oral, Daily, Mary Mccray PA-C, 40 mg at  10/29/23 0923    LORazepam (ATIVAN) tablet 1 mg, 1 mg, Oral, Q1H PRN **OR** LORazepam (ATIVAN) injection 1 mg, 1 mg, Intravenous, Q1H PRN **OR** LORazepam (ATIVAN) tablet 2 mg, 2 mg, Oral, Q1H PRN **OR** LORazepam (ATIVAN) injection 2 mg, 2 mg, Intravenous, Q1H PRN, 2 mg at 10/29/23 0930 **OR** LORazepam (ATIVAN) injection 2 mg, 2 mg, Intravenous, Q15 Min PRN, 2 mg at 10/27/23 1721 **OR** LORazepam (ATIVAN) injection 2 mg, 2 mg, Intramuscular, Q15 Min PRN, Mahendra Mary R, PA-C    Magnesium Standard Dose Replacement - Follow Nurse / BPA Driven Protocol, , Does not apply, PRN, Mahendra Mary R, PA-C    multivitamin with minerals 1 tablet, 1 tablet, Oral, Daily, Trinidad Mccrayitlin R, PA-C, 1 tablet at 10/29/23 0923    pantoprazole (PROTONIX) EC tablet 40 mg, 40 mg, Oral, Q AM, Chriss Alejo MD, 40 mg at 10/29/23 0442    sodium chloride 0.9 % flush 10 mL, 10 mL, Intravenous, PRN, Mahendra, Mary R, PA-C    sodium chloride 0.9 % flush 10 mL, 10 mL, Intravenous, Q12H, Mahendra Mary R, PA-C, 10 mL at 10/29/23 0924    sodium chloride 0.9 % flush 10 mL, 10 mL, Intravenous, PRN, Mahendra, Mary R, PA-C    sodium chloride 0.9 % infusion 40 mL, 40 mL, Intravenous, PRN, Mahendra, Mary R, PA-C    sodium chloride 0.9 % with KCl 20 mEq/L infusion, 100 mL/hr, Intravenous, Continuous, StinglXena MD, Last Rate: 100 mL/hr at 10/29/23 0925, 100 mL/hr at 10/29/23 0925    thiamine (B-1) injection 200 mg, 200 mg, Intravenous, Q8H, 200 mg at 10/29/23 0929 **FOLLOWED BY** [START ON 11/2/2023] thiamine (VITAMIN B-1) tablet 100 mg, 100 mg, Oral, Daily, Mary Mccray PA-C    ticagrelor (BRILINTA) tablet 60 mg, 60 mg, Oral, BID, Mary Mccray PA-KAVEH, 60 mg at 10/29/23 0922    vilazodone (VIIBRYD) tablet 40 mg, 40 mg, Oral, Daily, Mary Mccray PA-KAVEH, 40 mg at 10/29/23 0923  Review of Systems:                Gastroenterology positive for rectal bleeding    Objective     Vital Signs  Temp:   [98.6 °F (37 °C)-99 °F (37.2 °C)] 99 °F (37.2 °C)  Heart Rate:  [58-83] 72  Resp:  [16-18] 18  BP: (100-135)/(63-85) 121/70  Body mass index is 29.66 kg/m².                  Physical Exam:              General: patient sleeping but arousable to sound              Eyes: no scleral icterus              Skin: warm and dry, not jaundiced              Abdomen: soft, normal bowel sounds, nontender, nondistended              Psychiatric: Appropriate affect and behavior                Results Review:                I reviewed the patient's new clinical results.    Results from last 7 days   Lab Units 10/29/23  0424 10/28/23  2051 10/28/23  1030 10/26/23  2032   WBC 10*3/mm3 6.95  --  7.61 9.71   HEMOGLOBIN g/dL 12.1*  12.1* 12.0* 12.9* 14.5   HEMATOCRIT % 38.2  38.2 36.2* 38.7 42.7   PLATELETS 10*3/mm3 204  --  201 328     Results from last 7 days   Lab Units 10/29/23  0424 10/27/23  0421 10/26/23  2014 10/23/23  1909   SODIUM mmol/L 141 144 140 143   POTASSIUM mmol/L 4.2 3.8 3.7 3.6   CHLORIDE mmol/L 112* 109* 102 105   CO2 mmol/L 20.0* 24.2 19.0* 29.0   BUN mg/dL 16 21* 20 20   CREATININE mg/dL 0.97 1.06 1.46* 1.19   CALCIUM mg/dL 8.3* 8.7 9.7 9.3   BILIRUBIN mg/dL  --   --  0.7 0.3   ALK PHOS U/L  --   --  73 68   ALT (SGPT) U/L  --   --  42* 29   AST (SGOT) U/L  --   --  24 23   GLUCOSE mg/dL 96 95 127* 131*     Results from last 7 days   Lab Units 10/26/23  2014 10/23/23  1909   INR  0.99 0.93       Radiology:  MRI Brain Without Contrast   Final Result         Electronically signed by Antonio More MD on 10-27-23 at 0057      XR Chest 1 View   Final Result   Negative.       This report was finalized on 10/26/2023 9:11 PM by Dr. Pk Oropeza M.D on Workstation: [id number]          CT Head Without Contrast Stroke Protocol   Final Result   Negative head CT.       Radiation dose reduction techniques were utilized, including automated   exposure control and exposure modulation based on body size.           This  report was finalized on 10/26/2023 9:22 PM by Dr. Pk Oropeza M.D on Workstation: [id number]              Assessment & Plan     Active Hospital Problems    Diagnosis     **Vertigo     Alcohol abuse     Concussion     Fall     Head injury, closed, with concussion     HTN (hypertension)     CVA (cerebral vascular accident)     Mixed hyperlipidemia        Assessment:  4-week history of rectal bleeding on Brilinta and baby aspirin   History of Lepe's esophagus  History of small bowel resection for diverticulitis  History of CVA August 2023 treated with DAPT x21 days, worsening left cerebellar infarct October 2023 started on Brilinta and aspirin, recommended for 90 days of Brilinta and aspirin then Brilinta monotherapy after 90 days  History of pancreatitis  History of colon polyps, last colonoscopy December 11, 2022 with 12 mm ascending tubular adenomatous colon polyp, scattered diverticulum in the left colon and internal hemorrhoids, recommended for colonoscopy repeat in 5 years for screening  Daily alcohol consumption  Followed by outpatient gastroenterologist Dr. Springer      Plan:  Patient admitted with hemoglobin of 14.5, hemoglobin currently 12.1, he reports a 4-week history of blood per rectum, Continue to monitor H&H and transfuse per primary, if evidence of acute blood loss via GI track with clinical instability, please contact GI service immediately  On Brilinta, patient would like to schedule colonoscopy when after he has been on Brilinta for 90 days and will follow-up with outpatient gastroenterologist Dr. Springer, I offered inpatient evaluation with bridging as it 90 days he will stop aspirin but continue Brilinta only but again patient deferred and wishes to proceed with outpatient evaluation after he has been on Brilinta for 90 days  Recommend repeat EGD for surveillance of Lepe's esophagus outpatient with Dr. Springer as well  Continue pantoprazole 40 mg daily  Recommend alcohol cessation    GI will  sign off at this time but can be available at any time if any change in clinical course or we can be of any further assistance.      I discussed the patients findings and my recommendations with patient and nursing staff.           Judit VERNON  [facility] [place]  [address] [place] [address]

## 2023-10-30 LAB
ANION GAP SERPL CALCULATED.3IONS-SCNC: 10 MMOL/L (ref 5–15)
BASOPHILS # BLD AUTO: 0.05 10*3/MM3 (ref 0–0.2)
BASOPHILS NFR BLD AUTO: 0.7 % (ref 0–1.5)
BUN SERPL-MCNC: 12 MG/DL (ref 6–20)
BUN/CREAT SERPL: 12 (ref 7–25)
CALCIUM SPEC-SCNC: 8.8 MG/DL (ref 8.6–10.5)
CHLORIDE SERPL-SCNC: 109 MMOL/L (ref 98–107)
CO2 SERPL-SCNC: 21 MMOL/L (ref 22–29)
CREAT SERPL-MCNC: 1 MG/DL (ref 0.76–1.27)
DEPRECATED RDW RBC AUTO: 43.5 FL (ref 37–54)
EGFRCR SERPLBLD CKD-EPI 2021: 88.3 ML/MIN/1.73
EOSINOPHIL # BLD AUTO: 0.45 10*3/MM3 (ref 0–0.4)
EOSINOPHIL NFR BLD AUTO: 6.3 % (ref 0.3–6.2)
ERYTHROCYTE [DISTWIDTH] IN BLOOD BY AUTOMATED COUNT: 14.4 % (ref 12.3–15.4)
GLUCOSE SERPL-MCNC: 89 MG/DL (ref 65–99)
HCT VFR BLD AUTO: 34.6 % (ref 37.5–51)
HCT VFR BLD AUTO: 36.4 % (ref 37.5–51)
HCT VFR BLD AUTO: 37 % (ref 37.5–51)
HGB BLD-MCNC: 11.7 G/DL (ref 13–17.7)
HGB BLD-MCNC: 12.2 G/DL (ref 13–17.7)
HGB BLD-MCNC: 12.5 G/DL (ref 13–17.7)
IMM GRANULOCYTES # BLD AUTO: 0.02 10*3/MM3 (ref 0–0.05)
IMM GRANULOCYTES NFR BLD AUTO: 0.3 % (ref 0–0.5)
LYMPHOCYTES # BLD AUTO: 1.63 10*3/MM3 (ref 0.7–3.1)
LYMPHOCYTES NFR BLD AUTO: 22.9 % (ref 19.6–45.3)
MCH RBC QN AUTO: 27.7 PG (ref 26.6–33)
MCHC RBC AUTO-ENTMCNC: 33.5 G/DL (ref 31.5–35.7)
MCV RBC AUTO: 82.5 FL (ref 79–97)
MONOCYTES # BLD AUTO: 0.57 10*3/MM3 (ref 0.1–0.9)
MONOCYTES NFR BLD AUTO: 8 % (ref 5–12)
NEUTROPHILS NFR BLD AUTO: 4.4 10*3/MM3 (ref 1.7–7)
NEUTROPHILS NFR BLD AUTO: 61.8 % (ref 42.7–76)
NRBC BLD AUTO-RTO: 0 /100 WBC (ref 0–0.2)
PLATELET # BLD AUTO: 205 10*3/MM3 (ref 140–450)
PMV BLD AUTO: 10.1 FL (ref 6–12)
POTASSIUM SERPL-SCNC: 3.9 MMOL/L (ref 3.5–5.2)
RBC # BLD AUTO: 4.41 10*6/MM3 (ref 4.14–5.8)
SODIUM SERPL-SCNC: 140 MMOL/L (ref 136–145)
WBC NRBC COR # BLD: 7.12 10*3/MM3 (ref 3.4–10.8)

## 2023-10-30 PROCEDURE — 25010000002 THIAMINE HCL 200 MG/2ML SOLUTION: Performed by: STUDENT IN AN ORGANIZED HEALTH CARE EDUCATION/TRAINING PROGRAM

## 2023-10-30 PROCEDURE — 80048 BASIC METABOLIC PNL TOTAL CA: CPT | Performed by: INTERNAL MEDICINE

## 2023-10-30 PROCEDURE — 97116 GAIT TRAINING THERAPY: CPT

## 2023-10-30 PROCEDURE — 96376 TX/PRO/DX INJ SAME DRUG ADON: CPT

## 2023-10-30 PROCEDURE — 85014 HEMATOCRIT: CPT | Performed by: NURSE PRACTITIONER

## 2023-10-30 PROCEDURE — G0378 HOSPITAL OBSERVATION PER HR: HCPCS

## 2023-10-30 PROCEDURE — 97530 THERAPEUTIC ACTIVITIES: CPT

## 2023-10-30 PROCEDURE — 85025 COMPLETE CBC W/AUTO DIFF WBC: CPT | Performed by: INTERNAL MEDICINE

## 2023-10-30 PROCEDURE — 85018 HEMOGLOBIN: CPT | Performed by: NURSE PRACTITIONER

## 2023-10-30 RX ORDER — ONDANSETRON 2 MG/ML
4 INJECTION INTRAMUSCULAR; INTRAVENOUS EVERY 6 HOURS PRN
Status: DISCONTINUED | OUTPATIENT
Start: 2023-10-30 | End: 2023-11-06 | Stop reason: HOSPADM

## 2023-10-30 RX ADMIN — MULTIPLE VITAMINS W/ MINERALS TAB 1 TABLET: TAB at 08:35

## 2023-10-30 RX ADMIN — VILAZODONE HYDROCHLORIDE 40 MG: 40 TABLET, FILM COATED ORAL at 08:35

## 2023-10-30 RX ADMIN — THIAMINE HYDROCHLORIDE 200 MG: 100 INJECTION, SOLUTION INTRAMUSCULAR; INTRAVENOUS at 21:30

## 2023-10-30 RX ADMIN — AMLODIPINE BESYLATE 5 MG: 5 TABLET ORAL at 08:35

## 2023-10-30 RX ADMIN — ATORVASTATIN CALCIUM 80 MG: 80 TABLET, FILM COATED ORAL at 21:30

## 2023-10-30 RX ADMIN — ACETAMINOPHEN 650 MG: 325 TABLET, FILM COATED ORAL at 21:30

## 2023-10-30 RX ADMIN — ASPIRIN 81 MG: 81 TABLET, COATED ORAL at 08:36

## 2023-10-30 RX ADMIN — TICAGRELOR 60 MG: 60 TABLET ORAL at 08:35

## 2023-10-30 RX ADMIN — THIAMINE HYDROCHLORIDE 200 MG: 100 INJECTION, SOLUTION INTRAMUSCULAR; INTRAVENOUS at 06:28

## 2023-10-30 RX ADMIN — LORAZEPAM 1 MG: 1 TABLET ORAL at 16:09

## 2023-10-30 RX ADMIN — HYDROXYZINE HYDROCHLORIDE 25 MG: 25 TABLET ORAL at 16:09

## 2023-10-30 RX ADMIN — CARVEDILOL 6.25 MG: 6.25 TABLET, FILM COATED ORAL at 17:58

## 2023-10-30 RX ADMIN — LISINOPRIL 40 MG: 20 TABLET ORAL at 08:35

## 2023-10-30 RX ADMIN — THIAMINE HYDROCHLORIDE 200 MG: 100 INJECTION, SOLUTION INTRAMUSCULAR; INTRAVENOUS at 13:59

## 2023-10-30 RX ADMIN — Medication 10 ML: at 08:37

## 2023-10-30 RX ADMIN — HYDRALAZINE HYDROCHLORIDE 100 MG: 50 TABLET ORAL at 13:59

## 2023-10-30 RX ADMIN — HYDRALAZINE HYDROCHLORIDE 100 MG: 50 TABLET ORAL at 06:28

## 2023-10-30 RX ADMIN — CARVEDILOL 6.25 MG: 6.25 TABLET, FILM COATED ORAL at 08:35

## 2023-10-30 RX ADMIN — LORAZEPAM 2 MG: 1 TABLET ORAL at 04:27

## 2023-10-30 RX ADMIN — TICAGRELOR 60 MG: 60 TABLET ORAL at 21:30

## 2023-10-30 RX ADMIN — PANTOPRAZOLE SODIUM 40 MG: 40 TABLET, DELAYED RELEASE ORAL at 06:28

## 2023-10-30 RX ADMIN — HYDRALAZINE HYDROCHLORIDE 100 MG: 50 TABLET ORAL at 21:30

## 2023-10-30 RX ADMIN — LORAZEPAM 2 MG: 1 TABLET ORAL at 08:40

## 2023-10-30 RX ADMIN — FOLIC ACID 1 MG: 1 TABLET ORAL at 08:35

## 2023-10-30 NOTE — PLAN OF CARE
Problem: Adult Inpatient Plan of Care  Goal: Absence of Hospital-Acquired Illness or Injury  Intervention: Prevent Skin Injury  Recent Flowsheet Documentation  Taken 10/29/2023 2100 by Lisa Duncan, RN  Body Position: position changed independently   Goal Outcome Evaluation:

## 2023-10-30 NOTE — THERAPY TREATMENT NOTE
Patient Name: Flako Coreas  : 1965    MRN: [id number]                              Today's Date: 10/30/2023       Admit Date: 10/26/2023    Visit Dx:     ICD-10-CM ICD-9-CM   1. Vertigo  R42 780.4   2. Closed head injury, initial encounter  S09.90XA 959.01   3. Laceration of forehead, initial encounter  S01.81XA 873.42   4. Elevated serum creatinine  R79.89 790.99   5. 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    Acute CVA (cerebrovascular accident)    HTN (hypertension)    Late effect of cerebrovascular accident (CVA)    Concussion    Fall    Head injury, closed, with concussion    Alcohol abuse     Past Medical History:   Diagnosis Date    ADHD (attention deficit hyperactivity disorder)     On going issue    Anxiety     Lepe's esophagus     Benign prostatic hyperplasia Surgery in 2021    Clotting disorder Intestinal    Depression     Diverticulitis     Diverticulosis     Duodenitis     Enteritis     Failure to thrive (child)     Family history of blood clots     GERD (gastroesophageal reflux disease)     Hyperlipidemia     Hypertension     Hypertensive emergency     Low testosterone     Microcytosis     Pancreatitis     Pneumonia     PONV (postoperative nausea and vomiting)     Seasonal affective disorder     Shoulder injury     Stroke     Unexplained weight loss      Past Surgical History:   Procedure Laterality Date    COLON SURGERY      COLONOSCOPY      COLONOSCOPY N/A 2022    Procedure: COLONOSCOPY INTO CECUM WITH HOT SNARE POLYPECTOMY;  Surgeon: Sabino  Albert SOUZA MD;  Location: [facility] ENDOSCOPY;  Service: Gastroenterology;  Laterality: N/A;  PRE- GI BLEED  POST- DIVERTICULOSIS, POLYP    ENDOSCOPY N/A 12/10/2022    Procedure: ESOPHAGOGASTRODUODENOSCOPY;  Surgeon: Albert Gallo MD;  Location: [facility] ENDOSCOPY;  Service: Gastroenterology;  Laterality: N/A;  PRE- DARK STOOLS  POST- BARRETTS ESOPHAGUS    FRACTURE SURGERY  1980    for broken arm    FRACTURE SURGERY      for broken hand    INCISION AND DRAINAGE ABSCESS  2015    anal    PROSTATE SURGERY      SMALL INTESTINE SURGERY      TONSILLECTOMY        General Information       Row Name 10/30/23 0859          Physical Therapy Time and Intention    Document Type therapy note (daily note)  -DB     Mode of Treatment individual therapy;physical therapy  -DB       Row Name 10/30/23 0859          General Information    Patient Profile Reviewed yes  -DB               User Key  (r) = Recorded By, (t) = Taken By, (c) = Cosigned By      Initials Name Provider Type    DB Marianne Mccullough PT Physical Therapist                   Mobility       Row Name 10/30/23 0859          Bed Mobility    Bed Mobility supine-sit;sit-supine  -DB     Supine-Sit [place] (Bed Mobility) standby assist  -DB     Sit-Supine [place] (Bed Mobility) standby assist  -DB     Assistive Device (Bed Mobility) bed rails  -DB       Row Name 10/30/23 0859          Sit-Stand Transfer    Sit-Stand [place] (Transfers) minimum assist (75% patient effort);verbal cues  -DB     Assistive Device (Sit-Stand Transfers) walker, front-wheeled  -DB       Row Name 10/30/23 0859          Gait/Stairs (Locomotion)    [place] Level (Gait) contact guard;minimum assist (75% patient effort);verbal cues;moderate assist (50% patient effort)  -DB     Assistive Device (Gait) walker, front-wheeled  -DB     Distance in Feet (Gait) 60'  -DB     Deviations/Abnormal Patterns (Gait) stride length decreased;base of support, narrow;ataxic  -DB     Left Sided Gait Deviations  "heel strike decreased;knee buckling, left side  -DB               User Key  (r) = Recorded By, (t) = Taken By, (c) = Cosigned By      Initials Name Provider Type    Marianne Kim PT Physical Therapist                   Obj/Interventions       Row Name 10/30/23 0901          Motor Skills    Therapeutic Exercise other (see comments)  seated and supine LE ther ex x 15  -DB       Row Name 10/30/23 0901          Balance    Balance Assessment sitting static balance;sitting dynamic balance;standing static balance;standing dynamic balance  -DB     Static Sitting Balance supervision  -DB     Dynamic Sitting Balance supervision  -DB     Position, Sitting Balance unsupported;sitting edge of bed  -DB     Static Standing Balance contact guard  -DB     Dynamic Standing Balance minimal assist;moderate assist  -DB     Position/Device Used, Standing Balance supported;walker, front-wheeled  -DB     Balance Interventions sitting;standing;sit to stand  -DB               User Key  (r) = Recorded By, (t) = Taken By, (c) = Cosigned By      Initials Name Provider Type    Marianne Kim PT Physical Therapist                   Goals/Plan    No documentation.                  Clinical Impression       Row Name 10/30/23 0902          Pain    Pain Intervention(s) Ambulation/increased activity;Repositioned  -DB       Row Name 10/30/23 0902          Plan of Care Review    Plan of Care Reviewed With patient  -DB     Progress declining  -DB     Outcome Evaluation Pt supine in bed and agreeable to work with PT today. Pt nic's needing inc'd assist with ambulation today, min/modA x1 for 60' with RW, cues for gait mechanics and rest breaks. Pt nic's dec'd heel strike and knee buckling on L side. States he feels \"weird\" this morning. SpO2 and HR WNL, RN notified of decline in function and states she just administered medication that may be effecting pt. Pt able to sit EOB and rest prior to performing LE ther ex. Pt declines sitting UIC " this morning, supine in bed at end of the session. He continues to benefit from skilled PT.  -DB       Row Name 10/30/23 0902          Vital Signs    O2 Delivery Pre Treatment room air  -DB     Intra SpO2 (%) 97  -DB     O2 Delivery Intra Treatment room air  -DB     O2 Delivery Post Treatment room air  -DB     Pre Patient Position Supine  -DB     Intra Patient Position Standing  -DB     Post Patient Position Supine  -DB       Row Name 10/30/23 0902          Positioning and Restraints    Pre-Treatment Position in bed  -DB     Post Treatment Position bed  -DB     In Bed supine;call light within reach;encouraged to call for assist;exit alarm on;notified nsg  -DB               User Key  (r) = Recorded By, (t) = Taken By, (c) = Cosigned By      Initials Name Provider Type    Marianne Kim PT Physical Therapist                   Outcome Measures       Row Name 10/30/23 0907 10/30/23 0500       How much help from another person do you currently need...    Turning from your back to your side while in flat bed without using bedrails? 4  -DB 4  -CM    Moving from lying on back to sitting on the side of a flat bed without bedrails? 4  -DB 4  -CM    Moving to and from a bed to a chair (including a wheelchair)? 3  -DB 4  -CM    Standing up from a chair using your arms (e.g., wheelchair, bedside chair)? 3  -DB 4  -CM    Climbing 3-5 steps with a railing? 2  -DB 3  -CM    To walk in hospital room? 3  -DB 3  -CM    AM-PAC 6 Clicks Score (PT) 19  -DB 22  -CM    Highest level of mobility 6 --> Walked 10 steps or more  -DB 7 --> Walked 25 feet or more  -CM      Row Name 10/30/23 0907          Functional Assessment    Outcome Measure Options AM-PAC 6 Clicks Basic Mobility (PT)  -DB               User Key  (r) = Recorded By, (t) = Taken By, (c) = Cosigned By      Initials Name Provider Type    Marianne Kim PT Physical Therapist    Lisa Srivastava, RN Registered Nurse                                 Physical Therapy  "Education       Title: PT OT SLP Therapies (Done)       Topic: Physical Therapy (Done)       Point: Mobility training (Done)       Learning Progress Summary             Patient Acceptance, E, VU by DB at 10/30/2023 0907    Acceptance, E,D, VU,NR by LW at 10/28/2023 1558    Acceptance, E, VU,NR by EM at 10/27/2023 1627                         Point: Home exercise program (Done)       Learning Progress Summary             Patient Acceptance, E, VU by DB at 10/30/2023 0907                         Point: Body mechanics (Done)       Learning Progress Summary             Patient Acceptance, E, VU by DB at 10/30/2023 0907                         Point: Precautions (Done)       Learning Progress Summary             Patient Acceptance, E, VU by DB at 10/30/2023 0907                                         User Key       Initials Effective Dates Name Provider Type Discipline    EM 06/16/21 -  Viviane Rivas, PT Physical Therapist PT    DB 06/16/21 -  Marianne Mccullough, PT Physical Therapist PT    LW 05/08/23 -  Margo Mario, PT Physical Therapist PT                  PT Recommendation and Plan     Plan of Care Reviewed With: patient  Progress: declining  Outcome Evaluation: Pt supine in bed and agreeable to work with PT today. Pt demo's needing inc'd assist with ambulation today, min/modA x1 for 60' with RW, cues for gait mechanics and rest breaks. Pt demo's dec'd heel strike and knee buckling on L side. States he feels \"weird\" this morning. SpO2 and HR WNL, RN notified of decline in function and states she just administered medication that may be effecting pt. Pt able to sit EOB and rest prior to performing LE ther ex. Pt declines sitting UIC this morning, supine in bed at end of the session. He continues to benefit from skilled PT.     Time Calculation:         PT Charges       Row Name 10/30/23 0908             Time Calculation    Start Time 0847  -DB      Stop Time 0910  -DB      Time Calculation (min) 23 min  -DB   "    PT Received On 10/30/23  -DB      PT - Next Appointment 10/31/23  -DB         Time Calculation- PT    Total Timed Code Minutes- PT 23 minute(s)  -DB         Timed Charges    62710 - PT Therapeutic Exercise Minutes 8  -DB      27884 - PT Therapeutic Activity Minutes 15  -DB         Total Minutes    Timed Charges Total Minutes 23  -DB       Total Minutes 23  -DB                User Key  (r) = Recorded By, (t) = Taken By, (c) = Cosigned By      Initials Name Provider Type    DB Marianne Mccullough, PT Physical Therapist                  Therapy Charges for Today       Code Description Service Date Service Provider Modifiers Qty    32209620155 HC PT THERAPEUTIC ACT EA 15 MIN 10/30/2023 Marianne Mccullough, PT GP 1    70493780956 HC GAIT TRAINING EA 15 MIN 10/30/2023 Marianne Mccullough, PT GP 1            PT G-Codes  Outcome Measure Options: AM-PAC 6 Clicks Basic Mobility (PT)  AM-PAC 6 Clicks Score (PT): 19  AM-PAC 6 Clicks Score (OT): 19  Modified Candido Scale: 4 - Moderately severe disability.  Unable to walk without assistance, and unable to attend to own bodily needs without assistance.  PT Discharge Summary  Anticipated Discharge Disposition (PT): inpatient rehabilitation facility    Marianne Mccullough PT  10/30/2023

## 2023-10-30 NOTE — PLAN OF CARE
"Goal Outcome Evaluation:  Plan of Care Reviewed With: patient        Progress: declining  Outcome Evaluation: Pt supine in bed and agreeable to work with PT today. Pt demo's needing inc'd assist with ambulation today, min/modA x1 for 60' with RW, cues for gait mechanics and rest breaks. Pt demo's dec'd heel strike and knee buckling on L side. States he feels \"weird\" this morning. SpO2 and HR WNL, RN notified of decline in function and states she just administered medication that may be effecting pt. Pt able to sit EOB and rest prior to performing LE ther ex. Pt declines sitting UIC this morning, supine in bed at end of the session. He continues to benefit from skilled PT.      Anticipated Discharge Disposition (PT): inpatient rehabilitation facility  "

## 2023-10-30 NOTE — PROGRESS NOTES
Name: Flako Coreas ADMIT: 10/26/2023   : 1965  PCP: Akash Rodriguez Jr., DO    MRN: [id number] LOS: 0 days   AGE/SEX: 58 y.o. male  ROOM: [facility]     Subjective   Subjective     Patient is lying on the bed and does not appear to be in major distress.  Denies nausea, vomiting abdominal pain, chest pain, shortness of breath.       Objective   Objective   Vital Signs  Temp:  [98.3 °F (36.8 °C)-99.1 °F (37.3 °C)] 98.4 °F (36.9 °C)  Heart Rate:  [59-66] 59  Resp:  [18] 18  BP: (125-174)/() 164/106  SpO2:  [96 %-98 %] 96 %  on   ;   Device (Oxygen Therapy): room air  Body mass index is 29.66 kg/m².  Physical Exam  HEENT: PERRLA, extract movements intact, Scleras no icterus  Neck: Supple, no JVD  Cardiovascular: Regular rate and rhythm with normal S1 and S2  Respiratory: Fairly clear to auscultation bilaterally with no wheezes  GI: Soft, nontender, bowel sounds are present  Extremities: No edema, palpable pedal pulses  Neurologic: Grossly nonfocal, no facial asymmetry      Results Review     I reviewed the patient's new clinical results.  Results from last 7 days   Lab Units 10/30/23  1232 10/30/23  0433 10/29/23  2038 10/29/23  1439 10/29/23  0424 10/28/23  2051 10/28/23  1030 10/26/23  2032   WBC 10*3/mm3  --  7.12  --   --  6.95  --  7.61 9.71   HEMOGLOBIN g/dL 12.5* 12.2* 11.7* 11.5* 12.1*  12.1*   < > 12.9* 14.5   PLATELETS 10*3/mm3  --  205  --   --  204  --  201 328    < > = values in this interval not displayed.     Results from last 7 days   Lab Units 10/30/23  0433 10/29/23  0424 10/27/23  0421 10/26/23  2014   SODIUM mmol/L 140 141 144 140   POTASSIUM mmol/L 3.9 4.2 3.8 3.7   CHLORIDE mmol/L 109* 112* 109* 102   CO2 mmol/L 21.0* 20.0* 24.2 19.0*   BUN mg/dL 12 16 21* 20   CREATININE mg/dL 1.00 0.97 1.06 1.46*   GLUCOSE mg/dL 89 96 95 127*   EGFR mL/min/1.73 88.3 91.6 82.4 56.1*     Results from last 7 days   Lab Units 10/26/23  2014 10/23/23  1909   ALBUMIN g/dL 4.9 4.3   BILIRUBIN  "mg/dL 0.7 0.3   ALK PHOS U/L 73 68   AST (SGOT) U/L 24 23   ALT (SGPT) U/L 42* 29     Results from last 7 days   Lab Units 10/30/23  0433 10/29/23  0424 10/27/23  0421 10/26/23  2014 10/23/23  1909   CALCIUM mg/dL 8.8 8.3* 8.7 9.7 9.3   ALBUMIN g/dL  --   --   --  4.9 4.3       No results found for: \"HGBA1C\", \"POCGLU\"      No radiology results for the last day    I have personally reviewed all medications:  Scheduled Medications  amLODIPine, 5 mg, Oral, Daily  aspirin, 81 mg, Oral, Daily  atorvastatin, 80 mg, Oral, Nightly  carvedilol, 6.25 mg, Oral, BID With Meals  folic acid, 1 mg, Oral, Daily  hydrALAZINE, 100 mg, Oral, Q8H  lidocaine 1% - EPINEPHrine 1:287861, 10 mL, Injection, Once  lisinopril, 40 mg, Oral, Daily  multivitamin with minerals, 1 tablet, Oral, Daily  pantoprazole, 40 mg, Oral, Q AM  senna-docusate sodium, 2 tablet, Oral, BID  sodium chloride, 10 mL, Intravenous, Q12H  thiamine (B-1) IV, 200 mg, Intravenous, Q8H   Followed by  [START ON 11/2/2023] thiamine, 100 mg, Oral, Daily  ticagrelor, 60 mg, Oral, BID  vilazodone, 40 mg, Oral, Daily    Infusions     Diet  Diet: Cardiac Diets; Healthy Heart (2-3 Na+); Texture: Regular Texture (IDDSI 7); Fluid Consistency: Thin (IDDSI 0)    I have personally reviewed:  [x]  Laboratory   [x]  Microbiology   [x]  Radiology   [x]  EKG/Telemetry  [x]  Cardiology/Vascular   []  Pathology    []  Records       Assessment/Plan     Active Hospital Problems    Diagnosis  POA    **Vertigo [R42]  Yes    Alcohol abuse [F10.10]  Unknown    Concussion [S06.0XAA]  Yes    Fall [W19.XXXA]  Yes    Head injury, closed, with concussion [S06.0XAA]  Yes    HTN (hypertension) [I10]  Yes    CVA (cerebral vascular accident) [I63.9]  Yes    Mixed hyperlipidemia [E78.2]  Yes      Resolved Hospital Problems    Diagnosis Date Resolved POA    TIA (transient ischemic attack) [G45.9] 10/27/2023 Yes       58 y.o. male admitted with Vertigo.    1.Head injury/concussion secondary to vertigo from " left cerebellar PICA severe stenosis, neurology did evaluate and MRI during this admission did not reveal any new acute infarcts or subacute infarcts.  Recommended SNF.  Continue with aspirin, Brilinta and statins.    2.  Subacute left cerebellar CVA diagnosed on 10/23/2023, treatment as mentioned above.    3.  Rectal bleeding, GI did evaluate today and offered further work-up however patient opted to postpone work-up until he can come off Brilinta which is 90 days from start date.  Follow-up with GI as an outpatient basis.    4.  Hypertension, on amlodipine, Coreg, hydralazine and monitor blood pressure closely.    5.  Ethanol abuse, will monitor for withdrawal and is on a CIWA protocol.  Continue with folate and thiamine supplements.  He was counseled extensively to quit drinking alcohol.  Receiving Ativan as needed.    6.  On SCDs for DVT prophylaxis.    7.  CODE STATUS is full code.    8.  Estimated discharge date, to rehab once a bed is available.    Copied text on this note has been reviewed by me on 10/30/2023      Mann Briseno MD  [facility]  10/30/23  16:52 EDT

## 2023-10-30 NOTE — PROGRESS NOTES
Continued Stay Note  [facility]     Patient Name: Flako Coreas  MRN: [id number]  Today's Date: 10/30/2023    Admit Date: 10/26/2023    Plan: Rehab referrals pending   Discharge Plan       Row Name 10/30/23 1209       Plan    Plan Rehab referrals pending    Patient/Family in Agreement with Plan yes    Plan Comments Rehab referrals pending to [facility] acute and highly rated SNF options per therapy recommendations. Back up referral pending to [facility]. CCP to follow for evals. Sophia Fields LCSW                   Discharge Codes    No documentation.                       Yamilet Fields LCSW

## 2023-10-30 NOTE — PROGRESS NOTES
[facility]    Referral received. Chart work done. Will see pt tomorrow to discussed dc plan. Hx multiple falls and ataxia from cerebellar stroke and anticipate him needing 24/ 7 asst after dc from rehab for safety.     Lo Han RN  Acute Rehab Admission Nurse

## 2023-10-31 LAB
ANION GAP SERPL CALCULATED.3IONS-SCNC: 7.5 MMOL/L (ref 5–15)
BASOPHILS # BLD AUTO: 0.03 10*3/MM3 (ref 0–0.2)
BASOPHILS NFR BLD AUTO: 0.5 % (ref 0–1.5)
BUN SERPL-MCNC: 13 MG/DL (ref 6–20)
BUN/CREAT SERPL: 13.8 (ref 7–25)
CALCIUM SPEC-SCNC: 8.9 MG/DL (ref 8.6–10.5)
CHLORIDE SERPL-SCNC: 110 MMOL/L (ref 98–107)
CO2 SERPL-SCNC: 22.5 MMOL/L (ref 22–29)
CREAT SERPL-MCNC: 0.94 MG/DL (ref 0.76–1.27)
DEPRECATED RDW RBC AUTO: 42.7 FL (ref 37–54)
EGFRCR SERPLBLD CKD-EPI 2021: 95.1 ML/MIN/1.73
EOSINOPHIL # BLD AUTO: 0.43 10*3/MM3 (ref 0–0.4)
EOSINOPHIL NFR BLD AUTO: 7 % (ref 0.3–6.2)
ERYTHROCYTE [DISTWIDTH] IN BLOOD BY AUTOMATED COUNT: 14.4 % (ref 12.3–15.4)
GLUCOSE SERPL-MCNC: 94 MG/DL (ref 65–99)
HCT VFR BLD AUTO: 35.8 % (ref 37.5–51)
HCT VFR BLD AUTO: 36.8 % (ref 37.5–51)
HCT VFR BLD AUTO: 37.8 % (ref 37.5–51)
HGB BLD-MCNC: 12.2 G/DL (ref 13–17.7)
HGB BLD-MCNC: 12.5 G/DL (ref 13–17.7)
HGB BLD-MCNC: 12.8 G/DL (ref 13–17.7)
IMM GRANULOCYTES # BLD AUTO: 0.02 10*3/MM3 (ref 0–0.05)
IMM GRANULOCYTES NFR BLD AUTO: 0.3 % (ref 0–0.5)
LYMPHOCYTES # BLD AUTO: 1.4 10*3/MM3 (ref 0.7–3.1)
LYMPHOCYTES NFR BLD AUTO: 23 % (ref 19.6–45.3)
MCH RBC QN AUTO: 27.9 PG (ref 26.6–33)
MCHC RBC AUTO-ENTMCNC: 34.1 G/DL (ref 31.5–35.7)
MCV RBC AUTO: 81.7 FL (ref 79–97)
MONOCYTES # BLD AUTO: 0.57 10*3/MM3 (ref 0.1–0.9)
MONOCYTES NFR BLD AUTO: 9.3 % (ref 5–12)
NEUTROPHILS NFR BLD AUTO: 3.65 10*3/MM3 (ref 1.7–7)
NEUTROPHILS NFR BLD AUTO: 59.9 % (ref 42.7–76)
NRBC BLD AUTO-RTO: 0 /100 WBC (ref 0–0.2)
PLATELET # BLD AUTO: 205 10*3/MM3 (ref 140–450)
PMV BLD AUTO: 9.8 FL (ref 6–12)
POTASSIUM SERPL-SCNC: 3.6 MMOL/L (ref 3.5–5.2)
RBC # BLD AUTO: 4.38 10*6/MM3 (ref 4.14–5.8)
SODIUM SERPL-SCNC: 140 MMOL/L (ref 136–145)
WBC NRBC COR # BLD: 6.1 10*3/MM3 (ref 3.4–10.8)

## 2023-10-31 PROCEDURE — 97530 THERAPEUTIC ACTIVITIES: CPT

## 2023-10-31 PROCEDURE — 25010000002 LORAZEPAM PER 2 MG: Performed by: STUDENT IN AN ORGANIZED HEALTH CARE EDUCATION/TRAINING PROGRAM

## 2023-10-31 PROCEDURE — 85014 HEMATOCRIT: CPT | Performed by: NURSE PRACTITIONER

## 2023-10-31 PROCEDURE — 25010000002 THIAMINE HCL 200 MG/2ML SOLUTION: Performed by: STUDENT IN AN ORGANIZED HEALTH CARE EDUCATION/TRAINING PROGRAM

## 2023-10-31 PROCEDURE — 96376 TX/PRO/DX INJ SAME DRUG ADON: CPT

## 2023-10-31 PROCEDURE — 80048 BASIC METABOLIC PNL TOTAL CA: CPT | Performed by: INTERNAL MEDICINE

## 2023-10-31 PROCEDURE — G0378 HOSPITAL OBSERVATION PER HR: HCPCS

## 2023-10-31 PROCEDURE — 85025 COMPLETE CBC W/AUTO DIFF WBC: CPT | Performed by: INTERNAL MEDICINE

## 2023-10-31 PROCEDURE — 85018 HEMOGLOBIN: CPT | Performed by: NURSE PRACTITIONER

## 2023-10-31 PROCEDURE — 97116 GAIT TRAINING THERAPY: CPT

## 2023-10-31 RX ADMIN — HYDROXYZINE HYDROCHLORIDE 25 MG: 25 TABLET ORAL at 01:19

## 2023-10-31 RX ADMIN — HYDRALAZINE HYDROCHLORIDE 100 MG: 50 TABLET ORAL at 13:27

## 2023-10-31 RX ADMIN — FOLIC ACID 1 MG: 1 TABLET ORAL at 08:40

## 2023-10-31 RX ADMIN — HYDRALAZINE HYDROCHLORIDE 100 MG: 50 TABLET ORAL at 21:25

## 2023-10-31 RX ADMIN — VILAZODONE HYDROCHLORIDE 40 MG: 40 TABLET, FILM COATED ORAL at 08:40

## 2023-10-31 RX ADMIN — LORAZEPAM 2 MG: 1 TABLET ORAL at 17:07

## 2023-10-31 RX ADMIN — AMLODIPINE BESYLATE 5 MG: 5 TABLET ORAL at 08:40

## 2023-10-31 RX ADMIN — HYDRALAZINE HYDROCHLORIDE 100 MG: 50 TABLET ORAL at 06:42

## 2023-10-31 RX ADMIN — TICAGRELOR 60 MG: 60 TABLET ORAL at 08:40

## 2023-10-31 RX ADMIN — MULTIPLE VITAMINS W/ MINERALS TAB 1 TABLET: TAB at 08:40

## 2023-10-31 RX ADMIN — CARVEDILOL 6.25 MG: 6.25 TABLET, FILM COATED ORAL at 08:40

## 2023-10-31 RX ADMIN — PANTOPRAZOLE SODIUM 40 MG: 40 TABLET, DELAYED RELEASE ORAL at 06:42

## 2023-10-31 RX ADMIN — Medication 10 ML: at 08:40

## 2023-10-31 RX ADMIN — THIAMINE HYDROCHLORIDE 200 MG: 100 INJECTION, SOLUTION INTRAMUSCULAR; INTRAVENOUS at 21:25

## 2023-10-31 RX ADMIN — ACETAMINOPHEN 650 MG: 325 TABLET, FILM COATED ORAL at 08:44

## 2023-10-31 RX ADMIN — ASPIRIN 81 MG: 81 TABLET, COATED ORAL at 08:40

## 2023-10-31 RX ADMIN — LORAZEPAM 2 MG: 2 INJECTION INTRAMUSCULAR; INTRAVENOUS at 08:44

## 2023-10-31 RX ADMIN — THIAMINE HYDROCHLORIDE 200 MG: 100 INJECTION, SOLUTION INTRAMUSCULAR; INTRAVENOUS at 06:42

## 2023-10-31 RX ADMIN — LISINOPRIL 40 MG: 20 TABLET ORAL at 08:40

## 2023-10-31 RX ADMIN — CARVEDILOL 6.25 MG: 6.25 TABLET, FILM COATED ORAL at 17:07

## 2023-10-31 RX ADMIN — Medication 10 ML: at 20:51

## 2023-10-31 RX ADMIN — THIAMINE HYDROCHLORIDE 200 MG: 100 INJECTION, SOLUTION INTRAMUSCULAR; INTRAVENOUS at 13:27

## 2023-10-31 RX ADMIN — TICAGRELOR 60 MG: 60 TABLET ORAL at 20:51

## 2023-10-31 RX ADMIN — ATORVASTATIN CALCIUM 80 MG: 80 TABLET, FILM COATED ORAL at 20:51

## 2023-10-31 NOTE — PLAN OF CARE
Problem: Alcohol Withdrawal  Goal: Alcohol Withdrawal Symptom Control  Outcome: Ongoing, Progressing   Goal Outcome Evaluation:

## 2023-10-31 NOTE — PROGRESS NOTES
Name: Flako Coreas ADMIT: 10/26/2023   : 1965  PCP: Akash Rodriguez Jr., DO    MRN: [id number] LOS: 0 days   AGE/SEX: 58 y.o. male  ROOM: [facility]     Subjective   Subjective     Patient is lying on the bed and does not appear to be in major distress.  Denies nausea, vomiting abdominal pain, chest pain, shortness of breath.       Objective   Objective   Vital Signs  Temp:  [98.4 °F (36.9 °C)-98.8 °F (37.1 °C)] 98.6 °F (37 °C)  Heart Rate:  [57-66] 66  Resp:  [18] 18  BP: (107-164)/(59-90) 161/89  SpO2:  [96 %-97 %] 97 %  on   ;   Device (Oxygen Therapy): room air  Body mass index is 29.66 kg/m².  Physical Exam  HEENT: PERRLA, extract movements intact, Scleras no icterus  Neck: Supple, no JVD  Cardiovascular: Regular rate and rhythm with normal S1 and S2  Respiratory: Fairly clear to auscultation bilaterally with no wheezes  GI: Soft, nontender, bowel sounds are present  Extremities: No edema, palpable pedal pulses  Neurologic: Grossly nonfocal, no facial asymmetry      Results Review     I reviewed the patient's new clinical results.  Results from last 7 days   Lab Units 10/31/23  1220 10/31/23  0538 10/30/23  2040 10/30/23  1232 10/30/23  0433 10/29/23  1439 10/29/23  0424 10/28/23  2051 10/28/23  1030   WBC 10*3/mm3  --  6.10  --   --  7.12  --  6.95  --  7.61   HEMOGLOBIN g/dL 12.8* 12.2* 11.7* 12.5* 12.2*   < > 12.1*  12.1*   < > 12.9*   PLATELETS 10*3/mm3  --  205  --   --  205  --  204  --  201    < > = values in this interval not displayed.     Results from last 7 days   Lab Units 10/31/23  0538 10/30/23  0433 10/29/23  0424 10/27/23  0421   SODIUM mmol/L 140 140 141 144   POTASSIUM mmol/L 3.6 3.9 4.2 3.8   CHLORIDE mmol/L 110* 109* 112* 109*   CO2 mmol/L 22.5 21.0* 20.0* 24.2   BUN mg/dL 13 12 16 21*   CREATININE mg/dL 0.94 1.00 0.97 1.06   GLUCOSE mg/dL 94 89 96 95   EGFR mL/min/1.73 95.1 88.3 91.6 82.4     Results from last 7 days   Lab Units 10/26/23  2014   ALBUMIN g/dL 4.9  "  BILIRUBIN mg/dL 0.7   ALK PHOS U/L 73   AST (SGOT) U/L 24   ALT (SGPT) U/L 42*     Results from last 7 days   Lab Units 10/31/23  0538 10/30/23  0433 10/29/23  0424 10/27/23  0421 10/26/23  2014   CALCIUM mg/dL 8.9 8.8 8.3* 8.7 9.7   ALBUMIN g/dL  --   --   --   --  4.9       No results found for: \"HGBA1C\", \"POCGLU\"      No radiology results for the last day    I have personally reviewed all medications:  Scheduled Medications  amLODIPine, 5 mg, Oral, Daily  aspirin, 81 mg, Oral, Daily  atorvastatin, 80 mg, Oral, Nightly  carvedilol, 6.25 mg, Oral, BID With Meals  folic acid, 1 mg, Oral, Daily  hydrALAZINE, 100 mg, Oral, Q8H  lidocaine 1% - EPINEPHrine 1:934829, 10 mL, Injection, Once  lisinopril, 40 mg, Oral, Daily  multivitamin with minerals, 1 tablet, Oral, Daily  pantoprazole, 40 mg, Oral, Q AM  senna-docusate sodium, 2 tablet, Oral, BID  sodium chloride, 10 mL, Intravenous, Q12H  thiamine (B-1) IV, 200 mg, Intravenous, Q8H   Followed by  [START ON 11/2/2023] thiamine, 100 mg, Oral, Daily  ticagrelor, 60 mg, Oral, BID  vilazodone, 40 mg, Oral, Daily    Infusions     Diet  Diet: Cardiac Diets; Healthy Heart (2-3 Na+); Texture: Regular Texture (IDDSI 7); Fluid Consistency: Thin (IDDSI 0)    I have personally reviewed:  [x]  Laboratory   [x]  Microbiology   [x]  Radiology   [x]  EKG/Telemetry  [x]  Cardiology/Vascular   []  Pathology    []  Records       Assessment/Plan     Active Hospital Problems    Diagnosis  POA    **Vertigo [R42]  Yes    Alcohol abuse [F10.10]  Unknown    Concussion [S06.0XAA]  Yes    Fall [W19.XXXA]  Yes    Head injury, closed, with concussion [S06.0XAA]  Yes    HTN (hypertension) [I10]  Yes    CVA (cerebral vascular accident) [I63.9]  Yes    Mixed hyperlipidemia [E78.2]  Yes      Resolved Hospital Problems    Diagnosis Date Resolved POA    TIA (transient ischemic attack) [G45.9] 10/27/2023 Yes       58 y.o. male admitted with Vertigo.    1.Head injury/concussion secondary to vertigo from " left cerebellar PICA severe stenosis, neurology did evaluate and MRI during this admission did not reveal any new acute infarcts or subacute infarcts.  Recommended BAR.  Continue with aspirin, Brilinta and statins.    2.  Subacute left cerebellar CVA diagnosed on 10/23/2023, treatment as mentioned above.    3.  Rectal bleeding, GI did evaluate today and offered further work-up however patient opted to postpone work-up until he can come off Brilinta which is 90 days from start date.  Follow-up with GI as an outpatient basis.    4.  Hypertension, on amlodipine, Coreg, hydralazine and monitor blood pressure closely.    5.  Ethanol abuse, will monitor for withdrawal and is on a CIWA protocol.  Continue with folate and thiamine supplements.  He was counseled extensively to quit drinking alcohol.  Receiving Ativan as needed. Will dc CIWA tomorrow if no further ativan required.     6.  On SCDs for DVT prophylaxis.    7.  CODE STATUS is full code.    8.  Estimated discharge date, to rehab once a bed is available.    Copied text on this note has been reviewed by me on 10/31/2023      Edelmira Trujillo MD  [facility]  10/31/23  16:36 EDT

## 2023-10-31 NOTE — THERAPY TREATMENT NOTE
Patient Name: Flako Coreas  : 1965    MRN: [id number]                              Today's Date: 10/31/2023       Admit Date: 10/26/2023    Visit Dx:     ICD-10-CM ICD-9-CM   1. Vertigo  R42 780.4   2. Closed head injury, initial encounter  S09.90XA 959.01   3. Laceration of forehead, initial encounter  S01.81XA 873.42   4. Elevated serum creatinine  R79.89 790.99   5. 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    Acute CVA (cerebrovascular accident)    HTN (hypertension)    Late effect of cerebrovascular accident (CVA)    Concussion    Fall    Head injury, closed, with concussion    Alcohol abuse     Past Medical History:   Diagnosis Date    ADHD (attention deficit hyperactivity disorder)     On going issue    Anxiety     Lepe's esophagus     Benign prostatic hyperplasia Surgery in 2021    Clotting disorder Intestinal    Depression     Diverticulitis     Diverticulosis     Duodenitis     Enteritis     Failure to thrive (child)     Family history of blood clots     GERD (gastroesophageal reflux disease)     Hyperlipidemia     Hypertension     Hypertensive emergency     Low testosterone     Microcytosis     Pancreatitis     Pneumonia     PONV (postoperative nausea and vomiting)     Seasonal affective disorder     Shoulder injury     Stroke     Unexplained weight loss      Past Surgical History:   Procedure Laterality Date    COLON SURGERY      COLONOSCOPY      COLONOSCOPY N/A 2022    Procedure: COLONOSCOPY INTO CECUM WITH HOT SNARE POLYPECTOMY;  Surgeon: Sabino  Albert SOUZA MD;  Location: [facility] ENDOSCOPY;  Service: Gastroenterology;  Laterality: N/A;  PRE- GI BLEED  POST- DIVERTICULOSIS, POLYP    ENDOSCOPY N/A 12/10/2022    Procedure: ESOPHAGOGASTRODUODENOSCOPY;  Surgeon: Albert Gallo MD;  Location: [facility] ENDOSCOPY;  Service: Gastroenterology;  Laterality: N/A;  PRE- DARK STOOLS  POST- BARRETTS ESOPHAGUS    FRACTURE SURGERY  1980    for broken arm    FRACTURE SURGERY      for broken hand    INCISION AND DRAINAGE ABSCESS  2015    anal    PROSTATE SURGERY      SMALL INTESTINE SURGERY      TONSILLECTOMY        General Information       Row Name 10/31/23 0941          Physical Therapy Time and Intention    Document Type therapy note (daily note)  -EB     Mode of Treatment individual therapy;physical therapy  -EB       Row Name 10/31/23 0941          General Information    Patient Profile Reviewed yes  -EB     Existing Precautions/Restrictions fall  -EB       Row Name 10/31/23 0941          Cognition    Orientation Status (Cognition) oriented x 4  -EB       Row Name 10/31/23 0941          Safety Issues, Functional Mobility    Impairments Affecting Function (Mobility) balance;coordination;endurance/activity tolerance;strength  -EB               User Key  (r) = Recorded By, (t) = Taken By, (c) = Cosigned By      Initials Name Provider Type    EB Michelle Fraser PTA Physical Therapist Assistant                   Mobility       Row Name 10/31/23 0941          Bed Mobility    Supine-Sit Jackson (Bed Mobility) standby assist  -     Sit-Supine Jackson (Bed Mobility) standby assist  -     Assistive Device (Bed Mobility) bed rails  -       Row Name 10/31/23 0941          Sit-Stand Transfer    Sit-Stand Jackson (Transfers) minimum assist (75% patient effort);verbal cues  -     Assistive Device (Sit-Stand Transfers) walker, front-wheeled  -EB     Comment, (Sit-Stand Transfer) cues for hand placement. Pt cued to correct balance before tyring to ambulate. unsteady  with static standing balance.  -EB       Row Name 10/31/23 0941          Gait/Stairs (Locomotion)    Leon Level (Gait) contact guard;minimum assist (75% patient effort)  -EB     Assistive Device (Gait) walker, front-wheeled  -EB     Distance in Feet (Gait) 60ft  -EB     Deviations/Abnormal Patterns (Gait) gait speed decreased;stride length decreased;base of support, narrow  -EB     Bilateral Gait Deviations forward flexed posture  -EB     Left Sided Gait Deviations foot drop/toe drag;heel strike decreased;hip hiking  -EB     Comment, (Gait/Stairs) pt with left toe drag and decreased coordination of left foot placement. Pt widen RENÉ during gait, however needed cues to not over exaggerate RENÉ (too wide)  -EB               User Key  (r) = Recorded By, (t) = Taken By, (c) = Cosigned By      Initials Name Provider Type    Michelle Feliz PTA Physical Therapist Assistant                   Obj/Interventions       Row Name 10/31/23 0946          Motor Skills    Therapeutic Exercise --  Left LE: AP, and LAQs (X10)  -       Row Name 10/31/23 0946          Balance    Balance Assessment standing static balance  -EB     Static Sitting Balance minimal assist;verbal cues  -EB               User Key  (r) = Recorded By, (t) = Taken By, (c) = Cosigned By      Initials Name Provider Type    Michelle Feliz PTA Physical Therapist Assistant                   Goals/Plan    No documentation.                  Clinical Impression       Row Name 10/31/23 0946          Plan of Care Review    Plan of Care Reviewed With patient  -EB     Progress no change  -EB     Outcome Evaluation Pt seen for PT treatment today. Pt able to complete bed mobility with SBA but needed Evans with STS transfers with cues for hand placement. Pt is unsteady with static standing balance needing cues to widen RENÉ. Pt ambulated about 60ft with rwx, CGA/Evans. Pt demos left toe drag and decreased coordination of left foot placement. Pt required cues to widen  RENÉ however pt over exaggerated. No instance of knee buckling today but pt relies heavily on walker using UEs to off load weight of the LEs during gait. Recommend inpatient acute rehab before d/c home to [facility].  -       Row Name 10/31/23 0946          Therapy Assessment/Plan (PT)    Therapy Frequency (PT) 6 times/wk  -       Row Name 10/31/23 0946          Positioning and Restraints    Pre-Treatment Position in bed  -EB     Post Treatment Position bed  -EB     In Bed supine;call light within reach;encouraged to call for assist;exit alarm on  -EB               User Key  (r) = Recorded By, (t) = Taken By, (c) = Cosigned By      Initials Name Provider Type    EB Michelle Fraser PTA Physical Therapist Assistant                   Outcome Measures       Row Name 10/31/23 0952 10/31/23 0841       How much help from another person do you currently need...    Turning from your back to your side while in flat bed without using bedrails? 3  -EB 4  -GK    Moving from lying on back to sitting on the side of a flat bed without bedrails? 3  -EB 4  -GK    Moving to and from a bed to a chair (including a wheelchair)? 3  -EB 4  -GK    Standing up from a chair using your arms (e.g., wheelchair, bedside chair)? 3  -EB 3  -GK    Climbing 3-5 steps with a railing? 2  -EB 2  -GK    To walk in hospital room? 3  -EB 3  -GK    AM-PAC 6 Clicks Score (PT) 17  -EB 20  -GK    Highest level of mobility 5 --> Static standing  -EB 6 --> Walked 10 steps or more  -      Row Name 10/31/23 0600          How much help from another person do you currently need...    Turning from your back to your side while in flat bed without using bedrails? 4  -CM     Moving from lying on back to sitting on the side of a flat bed without bedrails? 4  -CM     Moving to and from a bed to a chair (including a wheelchair)? 4  -CM     Standing up from a chair using your arms (e.g., wheelchair, bedside chair)? 3  -CM     Climbing 3-5 steps with a railing? 3   -CM     To walk in hospital room? 3  -CM     AM-PAC 6 Clicks Score (PT) 21  -CM     Highest level of mobility 6 --> Walked 10 steps or more  -CM       Row Name 10/31/23 0952          Modified Drew Scale    Modified Candido Scale 4 - Moderately severe disability.  Unable to walk without assistance, and unable to attend to own bodily needs without assistance.  -EB               User Key  (r) = Recorded By, (t) = Taken By, (c) = Cosigned By      Initials Name Provider Type    EB Michelle Fraser PTA Physical Therapist Assistant    Lisa Srivastava, RN Registered Nurse    Violeta Faustin RN Registered Nurse                                 Physical Therapy Education       Title: PT OT SLP Therapies (Done)       Topic: Physical Therapy (Done)       Point: Mobility training (Done)       Learning Progress Summary             Patient Acceptance, E,D, VU,NR by EB at 10/31/2023 0953    Acceptance, E, VU by DB at 10/30/2023 0907    Acceptance, E,D, VU,NR by LW at 10/28/2023 1558    Acceptance, E, VU,NR by EM at 10/27/2023 1627                         Point: Home exercise program (Done)       Learning Progress Summary             Patient Acceptance, E,D, VU,NR by EB at 10/31/2023 0953    Acceptance, E, VU by DB at 10/30/2023 0907                         Point: Body mechanics (Done)       Learning Progress Summary             Patient Acceptance, E,D, VU,NR by EB at 10/31/2023 0953    Acceptance, E, VU by DB at 10/30/2023 0907                         Point: Precautions (Done)       Learning Progress Summary             Patient Acceptance, E,D, VU,NR by EB at 10/31/2023 0953    Acceptance, E, VU by DB at 10/30/2023 0907                                         User Key       Initials Effective Dates Name Provider Type Discipline    EM 06/16/21 -  Viviane Rivas PT Physical Therapist PT    EB 02/14/23 -  Michelle Fraser PTA Physical Therapist Assistant PT    DB 06/16/21 -  Marianne Mccullough, PT Physical Therapist PT    LW  05/08/23 -  Margo Mario, PT Physical Therapist PT                  PT Recommendation and Plan     Plan of Care Reviewed With: patient  Progress: no change  Outcome Evaluation: Pt seen for PT treatment today. Pt able to complete bed mobility with SBA but needed Evans with STS transfers with cues for hand placement. Pt is unsteady with static standing balance needing cues to widen RENÉ. Pt ambulated about 60ft with rwx, CGA/Evans. Pt demos left toe drag and decreased coordination of left foot placement. Pt required cues to widen RENÉ however pt over exaggerated. No instance of knee buckling today but pt relies heavily on walker using UEs to off load weight of the LEs during gait. Recommend inpatient acute rehab before d/c home to return to [facility].     Time Calculation:         PT Charges       Row Name 10/31/23 0940             Time Calculation    Start Time 0857  -EB      Stop Time 0920  -EB      Time Calculation (min) 23 min  -EB      PT Received On 10/31/23  -EB      PT - Next Appointment 11/01/23  -EB         Time Calculation- PT    Total Timed Code Minutes- PT 23 minute(s)  -EB                User Key  (r) = Recorded By, (t) = Taken By, (c) = Cosigned By      Initials Name Provider Type    Michelle Feliz PTA Physical Therapist Assistant                  Therapy Charges for Today       Code Description Service Date Service Provider Modifiers Qty    23864849828 HC GAIT TRAINING EA 15 MIN 10/31/2023 Michelle Fraser PTA GP 1    [id number] HC PT THERAPEUTIC ACT EA 15 MIN 10/31/2023 Michelle Fraser PTA GP 1            PT G-Codes  Outcome Measure Options: AM-PAC 6 Clicks Basic Mobility (PT)  AM-PAC 6 Clicks Score (PT): 17  AM-PAC 6 Clicks Score (OT): 19  Modified Candido Scale: 4 - Moderately severe disability.  Unable to walk without assistance, and unable to attend to own bodily needs without assistance.       Michelle Fraser PTA  10/31/2023

## 2023-10-31 NOTE — PLAN OF CARE
Goal Outcome Evaluation:  Plan of Care Reviewed With: patient        Progress: no change  Outcome Evaluation: Pt seen for PT treatment today. Pt able to complete bed mobility with SBA but needed Evans with STS transfers with cues for hand placement. Pt is unsteady with static standing balance needing cues to widen RENÉ. Pt ambulated about 60ft with rwx, CGA/Evans. Pt demos left toe drag and decreased coordination of left foot placement. Pt required cues to widen RENÉ however pt over exaggerated. No instance of knee buckling today but pt relies heavily on walker using UEs to off load weight of the LEs during gait. Recommend inpatient acute rehab before d/c home to return to [facility].

## 2023-10-31 NOTE — PROGRESS NOTES
[facility] Acute Inpt Rehab    Discussed with patient acute vs subacute rehab.  Patient states he needs acute inpt rehab and is willing to participate in 3 hours of therapy a day.  Patient states son can help him 24/7 after rehab.    Spoke with son, he is willing to help and stay with patient.  He does work 7-3 weekdays.  Son is going to make some phone calls and let us know if he can find help for his dad while he's at work    Thank you,  Elizabeth Zaidi, RN  Rehab Admission Nurse

## 2023-11-01 LAB
ANION GAP SERPL CALCULATED.3IONS-SCNC: 9.9 MMOL/L (ref 5–15)
BASOPHILS # BLD AUTO: 0.04 10*3/MM3 (ref 0–0.2)
BASOPHILS NFR BLD AUTO: 0.5 % (ref 0–1.5)
BUN SERPL-MCNC: 14 MG/DL (ref 6–20)
BUN/CREAT SERPL: 14 (ref 7–25)
CALCIUM SPEC-SCNC: 8.8 MG/DL (ref 8.6–10.5)
CHLORIDE SERPL-SCNC: 108 MMOL/L (ref 98–107)
CO2 SERPL-SCNC: 22.1 MMOL/L (ref 22–29)
CREAT SERPL-MCNC: 1 MG/DL (ref 0.76–1.27)
DEPRECATED RDW RBC AUTO: 42.6 FL (ref 37–54)
EGFRCR SERPLBLD CKD-EPI 2021: 88.3 ML/MIN/1.73
EOSINOPHIL # BLD AUTO: 0.44 10*3/MM3 (ref 0–0.4)
EOSINOPHIL NFR BLD AUTO: 5.8 % (ref 0.3–6.2)
ERYTHROCYTE [DISTWIDTH] IN BLOOD BY AUTOMATED COUNT: 14.4 % (ref 12.3–15.4)
GLUCOSE SERPL-MCNC: 98 MG/DL (ref 65–99)
HCT VFR BLD AUTO: 38.5 % (ref 37.5–51)
HCT VFR BLD AUTO: 38.5 % (ref 37.5–51)
HGB BLD-MCNC: 12.9 G/DL (ref 13–17.7)
HGB BLD-MCNC: 12.9 G/DL (ref 13–17.7)
IMM GRANULOCYTES # BLD AUTO: 0.02 10*3/MM3 (ref 0–0.05)
IMM GRANULOCYTES NFR BLD AUTO: 0.3 % (ref 0–0.5)
LYMPHOCYTES # BLD AUTO: 1.7 10*3/MM3 (ref 0.7–3.1)
LYMPHOCYTES NFR BLD AUTO: 22.5 % (ref 19.6–45.3)
MCH RBC QN AUTO: 27.3 PG (ref 26.6–33)
MCHC RBC AUTO-ENTMCNC: 33.5 G/DL (ref 31.5–35.7)
MCV RBC AUTO: 81.4 FL (ref 79–97)
MONOCYTES # BLD AUTO: 0.62 10*3/MM3 (ref 0.1–0.9)
MONOCYTES NFR BLD AUTO: 8.2 % (ref 5–12)
NEUTROPHILS NFR BLD AUTO: 4.74 10*3/MM3 (ref 1.7–7)
NEUTROPHILS NFR BLD AUTO: 62.7 % (ref 42.7–76)
NRBC BLD AUTO-RTO: 0 /100 WBC (ref 0–0.2)
PLATELET # BLD AUTO: 247 10*3/MM3 (ref 140–450)
PMV BLD AUTO: 9.7 FL (ref 6–12)
POTASSIUM SERPL-SCNC: 3.5 MMOL/L (ref 3.5–5.2)
RBC # BLD AUTO: 4.73 10*6/MM3 (ref 4.14–5.8)
SODIUM SERPL-SCNC: 140 MMOL/L (ref 136–145)
WBC NRBC COR # BLD: 7.56 10*3/MM3 (ref 3.4–10.8)

## 2023-11-01 PROCEDURE — 97530 THERAPEUTIC ACTIVITIES: CPT

## 2023-11-01 PROCEDURE — G0378 HOSPITAL OBSERVATION PER HR: HCPCS

## 2023-11-01 PROCEDURE — 97116 GAIT TRAINING THERAPY: CPT

## 2023-11-01 PROCEDURE — 97535 SELF CARE MNGMENT TRAINING: CPT

## 2023-11-01 PROCEDURE — 80048 BASIC METABOLIC PNL TOTAL CA: CPT | Performed by: INTERNAL MEDICINE

## 2023-11-01 PROCEDURE — 85014 HEMATOCRIT: CPT | Performed by: NURSE PRACTITIONER

## 2023-11-01 PROCEDURE — 25010000002 ONDANSETRON PER 1 MG: Performed by: INTERNAL MEDICINE

## 2023-11-01 PROCEDURE — 96376 TX/PRO/DX INJ SAME DRUG ADON: CPT

## 2023-11-01 PROCEDURE — 85025 COMPLETE CBC W/AUTO DIFF WBC: CPT | Performed by: INTERNAL MEDICINE

## 2023-11-01 PROCEDURE — 96375 TX/PRO/DX INJ NEW DRUG ADDON: CPT

## 2023-11-01 PROCEDURE — 25010000002 THIAMINE PER 100 MG: Performed by: STUDENT IN AN ORGANIZED HEALTH CARE EDUCATION/TRAINING PROGRAM

## 2023-11-01 PROCEDURE — 85018 HEMOGLOBIN: CPT | Performed by: NURSE PRACTITIONER

## 2023-11-01 PROCEDURE — 97110 THERAPEUTIC EXERCISES: CPT

## 2023-11-01 RX ADMIN — Medication 100 MG: at 15:30

## 2023-11-01 RX ADMIN — ASPIRIN 81 MG: 81 TABLET, COATED ORAL at 09:10

## 2023-11-01 RX ADMIN — HYDROXYZINE HYDROCHLORIDE 25 MG: 25 TABLET ORAL at 02:37

## 2023-11-01 RX ADMIN — CARVEDILOL 6.25 MG: 6.25 TABLET, FILM COATED ORAL at 17:18

## 2023-11-01 RX ADMIN — TICAGRELOR 60 MG: 60 TABLET ORAL at 09:11

## 2023-11-01 RX ADMIN — VILAZODONE HYDROCHLORIDE 40 MG: 40 TABLET, FILM COATED ORAL at 09:11

## 2023-11-01 RX ADMIN — THIAMINE HYDROCHLORIDE 200 MG: 100 INJECTION, SOLUTION INTRAMUSCULAR; INTRAVENOUS at 05:37

## 2023-11-01 RX ADMIN — ONDANSETRON 4 MG: 2 INJECTION INTRAMUSCULAR; INTRAVENOUS at 13:01

## 2023-11-01 RX ADMIN — SENNOSIDES AND DOCUSATE SODIUM 2 TABLET: 50; 8.6 TABLET ORAL at 20:19

## 2023-11-01 RX ADMIN — PANTOPRAZOLE SODIUM 40 MG: 40 TABLET, DELAYED RELEASE ORAL at 05:36

## 2023-11-01 RX ADMIN — Medication 10 ML: at 09:12

## 2023-11-01 RX ADMIN — AMLODIPINE BESYLATE 5 MG: 5 TABLET ORAL at 09:11

## 2023-11-01 RX ADMIN — HYDRALAZINE HYDROCHLORIDE 100 MG: 50 TABLET ORAL at 20:20

## 2023-11-01 RX ADMIN — ATORVASTATIN CALCIUM 80 MG: 80 TABLET, FILM COATED ORAL at 20:19

## 2023-11-01 RX ADMIN — FOLIC ACID 1 MG: 1 TABLET ORAL at 09:11

## 2023-11-01 RX ADMIN — CARVEDILOL 6.25 MG: 6.25 TABLET, FILM COATED ORAL at 09:11

## 2023-11-01 RX ADMIN — MULTIPLE VITAMINS W/ MINERALS TAB 1 TABLET: TAB at 09:11

## 2023-11-01 RX ADMIN — HYDRALAZINE HYDROCHLORIDE 100 MG: 50 TABLET ORAL at 13:01

## 2023-11-01 RX ADMIN — LISINOPRIL 40 MG: 20 TABLET ORAL at 09:11

## 2023-11-01 RX ADMIN — TICAGRELOR 60 MG: 60 TABLET ORAL at 20:19

## 2023-11-01 RX ADMIN — HYDRALAZINE HYDROCHLORIDE 100 MG: 50 TABLET ORAL at 05:36

## 2023-11-01 RX ADMIN — HYDROXYZINE HYDROCHLORIDE 25 MG: 25 TABLET ORAL at 20:19

## 2023-11-01 RX ADMIN — Medication 10 ML: at 20:20

## 2023-11-01 NOTE — PROGRESS NOTES
Continued Stay Note  [facility]     Patient Name: Flako Croeas  MRN: [id number]  Today's Date: 11/1/2023    Admit Date: 10/26/2023    Plan: Rehab referrals pending   Discharge Plan       Row Name 11/01/23 1620       Plan    Plan Rehab referrals pending    Patient/Family in Agreement with Plan yes    Plan Comments Rehab referrals pending to [facility] acute (awaiting determination) and SNF options. CCP to continue to follow for evals. Sophia Fields LCSW                   Discharge Codes    No documentation.                 Expected Discharge Date and Time       Expected Discharge Date Expected Discharge Time    Nov 3, 2023               Yamilet Fields LCSW

## 2023-11-01 NOTE — THERAPY TREATMENT NOTE
Patient Name: Flako Coreas  : 1965    MRN: [id number]                              Today's Date: 2023       Admit Date: 10/26/2023    Visit Dx:     ICD-10-CM ICD-9-CM   1. Vertigo  R42 780.4   2. Closed head injury, initial encounter  S09.90XA 959.01   3. Laceration of forehead, initial encounter  S01.81XA 873.42   4. Elevated serum creatinine  R79.89 790.99   5. 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    Acute CVA (cerebrovascular accident)    HTN (hypertension)    Late effect of cerebrovascular accident (CVA)    Concussion    Fall    Head injury, closed, with concussion    Alcohol abuse     Past Medical History:   Diagnosis Date    ADHD (attention deficit hyperactivity disorder)     On going issue    Anxiety     Lepe's esophagus     Benign prostatic hyperplasia Surgery in 2021    Clotting disorder Intestinal    Depression     Diverticulitis     Diverticulosis     Duodenitis     Enteritis     Failure to thrive (child)     Family history of blood clots     GERD (gastroesophageal reflux disease)     Hyperlipidemia     Hypertension     Hypertensive emergency     Low testosterone     Microcytosis     Pancreatitis     Pneumonia     PONV (postoperative nausea and vomiting)     Seasonal affective disorder     Shoulder injury     Stroke     Unexplained weight loss      Past Surgical History:   Procedure Laterality Date    COLON SURGERY      COLONOSCOPY      COLONOSCOPY N/A 2022    Procedure: COLONOSCOPY INTO CECUM WITH HOT SNARE POLYPECTOMY;  Surgeon: Albert Gallo  MD LIBBY;  Location: [facility] ENDOSCOPY;  Service: Gastroenterology;  Laterality: N/A;  PRE- GI BLEED  POST- DIVERTICULOSIS, POLYP    ENDOSCOPY N/A 12/10/2022    Procedure: ESOPHAGOGASTRODUODENOSCOPY;  Surgeon: Albert Gallo MD;  Location: [facility] ENDOSCOPY;  Service: Gastroenterology;  Laterality: N/A;  PRE- DARK STOOLS  POST- BARRETTS ESOPHAGUS    FRACTURE SURGERY  1980    for broken arm    FRACTURE SURGERY      for broken hand    INCISION AND DRAINAGE ABSCESS  2015    anal    PROSTATE SURGERY      SMALL INTESTINE SURGERY      TONSILLECTOMY        General Information       Row Name 11/01/23 1106          OT Time and Intention    Document Type therapy note (daily note)  -     Mode of Treatment occupational therapy;individual therapy  -       Row Name 11/01/23 1106          General Information    Patient Profile Reviewed yes  -     Existing Precautions/Restrictions fall  -       Row Name 11/01/23 1106          Cognition    Orientation Status (Cognition) oriented x 4  -       Row Name 11/01/23 1106          Safety Issues, Functional Mobility    Impairments Affecting Function (Mobility) balance;endurance/activity tolerance;coordination;strength  dizziness  -               User Key  (r) = Recorded By, (t) = Taken By, (c) = Cosigned By      Initials Name Provider Type     Rafaela Bhatti, OT Occupational Therapist                     Mobility/ADL's       Row Name 11/01/23 1106          Bed Mobility    All Activities, [place] (Bed Mobility) standby assist  -       Row Name 11/01/23 1106          Sit-Stand Transfer    Sit-Stand [place] (Transfers) contact guard  -     Assistive Device (Sit-Stand Transfers) walker, front-wheeled  -       Row Name 11/01/23 1106          Functional Mobility    Functional Mobility- Ind. Level contact guard assist;minimum assist (75% patient effort)  -     Functional Mobility- Device walker, front-wheeled  -     Functional Mobility- Comment to/from bathroom X2  reps, uses UE support on rwx due to LLE weakness.  -       Row Name 11/01/23 1106          Activities of Daily Living    BADL Assessment/Intervention grooming;bathing;upper body dressing  -       Row Name 11/01/23 1106          Lower Body Dressing Assessment/Training    [place] Level (Lower Body Dressing) don;socks;standby assist  -     Position (Lower Body Dressing) edge of bed sitting  -       Row Name 11/01/23 1106          Grooming Assessment/Training    [place] Level (Grooming) grooming skills;contact guard assist  -     Position (Grooming) sink side;supported standing  -       Row Name 11/01/23 1106          Bathing Assessment/Intervention    [place] Level (Bathing) bathing skills;standby assist;contact guard assist  -     Position (Bathing) sink side;supported standing  -       Row Name 11/01/23 1106          Upper Body Dressing Assessment/Training    [place] Level (Upper Body Dressing) don;pull-over garment;set up  -     Position (Upper Body Dressing) supported sitting  -               User Key  (r) = Recorded By, (t) = Taken By, (c) = Cosigned By      Initials Name Provider Type    Rafaela Andino OT Occupational Therapist                   Obj/Interventions       Row Name 11/01/23 1149          Balance    Static Sitting Balance supervision  -     Position, Sitting Balance unsupported  -     Static Standing Balance contact guard  -     Dynamic Standing Balance contact guard;minimal assist  -     Position/Device Used, Standing Balance supported;walker, rolling  -     Balance Interventions standing;UE activity with balance activity  -     Comment, Balance static standing without AD 1 min, overhead reaching X10 reps, punching forward X10 reps, Overhead claps X10 reps without use of AD. Occasionally needed to rest and reach for UE support.  -               User Key  (r) = Recorded By, (t) = Taken By, (c) = Cosigned By      Initials Name Provider Type     Rafaela Andino OT Occupational Therapist                   Goals/Plan    No documentation.                  Clinical Impression       Row Name 11/01/23 1226          Pain Assessment    Pretreatment Pain Rating 0/10 - no pain  -     Posttreatment Pain Rating 0/10 - no pain  -       Row Name 11/01/23 1226          Plan of Care Review    Plan of Care Reviewed With patient  -     Progress improving  -     Outcome Evaluation Pt completed ADLs and standing dynamic balances exercises with OT today. He continues to have acute balance deficits limiting safety with all activites and increases risk of falls. Pt would continue to benefit from OT and dc to acute rehab to improve function.  -       Row Name 11/01/23 1226          Therapy Plan Review/Discharge Plan (OT)    Anticipated Discharge Disposition (OT) inpatient rehabilitation facility  -       Row Name 11/01/23 1226          Positioning and Restraints    Pre-Treatment Position in bed  -     Post Treatment Position chair  -SM     In Chair reclined;call light within reach;encouraged to call for assist;exit alarm on;notified nsg  -               User Key  (r) = Recorded By, (t) = Taken By, (c) = Cosigned By      Initials Name Provider Type    Rafaela Andino OT Occupational Therapist                   Outcome Measures       Row Name 11/01/23 1228          How much help from another is currently needed...    Putting on and taking off regular lower body clothing? 3  -SM     Bathing (including washing, rinsing, and drying) 3  -SM     Toileting (which includes using toilet bed pan or urinal) 3  -SM     Putting on and taking off regular upper body clothing 3  -SM     Taking care of personal grooming (such as brushing teeth) 3  -SM     Eating meals 4  -SM     AM-PAC 6 Clicks Score (OT) 19  -SM       Row Name 11/01/23 1015 11/01/23 0756       How much help from another person do you currently need...    Turning from your back to your side while in  flat bed without using bedrails? 3  -EB 3  -MM    Moving from lying on back to sitting on the side of a flat bed without bedrails? 3  -EB 3  -MM    Moving to and from a bed to a chair (including a wheelchair)? 3  -EB 3  -MM    Standing up from a chair using your arms (e.g., wheelchair, bedside chair)? 3  -EB 3  -MM    Climbing 3-5 steps with a railing? 2  -EB 2  -MM    To walk in hospital room? 3  -EB 3  -MM    AM-PAC 6 Clicks Score (PT) 17  -EB 17  -MM    Highest level of mobility 5 --> Static standing  -EB 5 --> Static standing  -MM      Row Name 11/01/23 1015          Modified Norris Scale    Modified Candido Scale 4 - Moderately severe disability.  Unable to walk without assistance, and unable to attend to own bodily needs without assistance.  -EB       Row Name 11/01/23 1228          Functional Assessment    Outcome Measure Options AM-PAC 6 Clicks Daily Activity (OT)  -               User Key  (r) = Recorded By, (t) = Taken By, (c) = Cosigned By      Initials Name Provider Type    EB Michelle Fraser, PTA Physical Therapist Assistant    Rafaela Andino, OT Occupational Therapist    Ying Back, RN Registered Nurse                    Occupational Therapy Education       Title: PT OT SLP Therapies (Done)       Topic: Occupational Therapy (Done)       Point: ADL training (Done)       Description:   Instruct learner(s) on proper safety adaptation and remediation techniques during self care or transfers.   Instruct in proper use of assistive devices.                  Learning Progress Summary             Patient Acceptance, E,D,TB, VU by VS at 10/27/2023 0335    Comment: OT instructed the pt. is exercises whaich her can complete to work on both cordination and strengthening with the (L)UE                         Point: Home exercise program (Done)       Description:   Instruct learner(s) on appropriate technique for monitoring, assisting and/or progressing therapeutic exercises/activities.                   Learning Progress Summary             Patient Acceptance, E,D,TB, VU by VS at 10/27/2023 1540    Comment: OT instructed the pt. is exercises whaich her can complete to work on both cordination and strengthening with the (L)UE                                         User Key       Initials Effective Dates Name Provider Type Discipline    VS 06/16/21 -  Agatha Meraz OTR Occupational Therapist OT                  OT Recommendation and Plan     Plan of Care Review  Plan of Care Reviewed With: patient  Progress: improving  Outcome Evaluation: Pt completed ADLs and standing dynamic balances exercises with OT today. He continues to have acute balance deficits limiting safety with all activites and increases risk of falls. Pt would continue to benefit from OT and dc to acute rehab to improve function.     Time Calculation:         Time Calculation- OT       Row Name 11/01/23 1229             Time Calculation- OT    OT Start Time 0840  -SM      OT Stop Time 0903  -SM      OT Time Calculation (min) 23 min  -SM      Total Timed Code Minutes- OT 23 minute(s)  -SM      OT Received On 11/01/23  -      OT - Next Appointment 11/02/23  -         Timed Charges    19831 - OT Therapeutic Activity Minutes 10  -SM      70288 - OT Self Care/Mgmt Minutes 13  -SM         Total Minutes    Timed Charges Total Minutes 23  -SM       Total Minutes 23  -SM                User Key  (r) = Recorded By, (t) = Taken By, (c) = Cosigned By      Initials Name Provider Type     Rafaela Bhatti OT Occupational Therapist                  Therapy Charges for Today       Code Description Service Date Service Provider Modifiers Qty    91601457082 HC OT THERAPEUTIC ACT EA 15 MIN 11/1/2023 Rafaela Bhatti OT GO 1    53188790008 HC OT SELF CARE/MGMT/TRAIN EA 15 MIN 11/1/2023 Rafaela Bhatti OT GO 1                 Rafaela Bhatti OT  11/1/2023

## 2023-11-01 NOTE — PLAN OF CARE
Problem: Adult Inpatient Plan of Care  Goal: Plan of Care Review  Outcome: Ongoing, Progressing  Flowsheets (Taken 11/1/2023 1754)  Progress: no change  Plan of Care Reviewed With: patient  Goal: Patient-Specific Goal (Individualized)  Outcome: Ongoing, Progressing  Goal: Absence of Hospital-Acquired Illness or Injury  Outcome: Ongoing, Progressing  Intervention: Identify and Manage Fall Risk  Recent Flowsheet Documentation  Taken 11/1/2023 1600 by Ying Alcaraz RN  Safety Promotion/Fall Prevention:   activity supervised   safety round/check completed   nonskid shoes/slippers when out of bed   fall prevention program maintained   assistive device/personal items within reach  Taken 11/1/2023 1400 by Ying Alcaraz RN  Safety Promotion/Fall Prevention:   activity supervised   safety round/check completed   nonskid shoes/slippers when out of bed   fall prevention program maintained   assistive device/personal items within reach  Taken 11/1/2023 1200 by Ying Alcaraz RN  Safety Promotion/Fall Prevention:   activity supervised   safety round/check completed   nonskid shoes/slippers when out of bed   fall prevention program maintained   assistive device/personal items within reach  Taken 11/1/2023 1000 by Ying Alcaraz RN  Safety Promotion/Fall Prevention:   activity supervised   safety round/check completed   nonskid shoes/slippers when out of bed   fall prevention program maintained   assistive device/personal items within reach  Taken 11/1/2023 0756 by Ying Alcaraz RN  Safety Promotion/Fall Prevention:   activity supervised   safety round/check completed   nonskid shoes/slippers when out of bed   fall prevention program maintained   assistive device/personal items within reach  Intervention: Prevent Skin Injury  Recent Flowsheet Documentation  Taken 11/1/2023 1600 by Ying Alcaraz RN  Body Position: position changed independently  Taken 11/1/2023 1400 by Ying Alcaraz, THEA  Body Position:  position changed independently  Taken 11/1/2023 1200 by Ying Alcaraz RN  Body Position: position changed independently  Taken 11/1/2023 0756 by Ying Alcaraz RN  Body Position: position changed independently  Intervention: Prevent and Manage VTE (Venous Thromboembolism) Risk  Recent Flowsheet Documentation  Taken 11/1/2023 1200 by Ying Alcaraz RN  Activity Management: back to bed  Taken 11/1/2023 1000 by Ying Alcaraz RN  Activity Management: up in chair  Intervention: Prevent Infection  Recent Flowsheet Documentation  Taken 11/1/2023 1600 by Ying Alcaraz RN  Infection Prevention:   rest/sleep promoted   single patient room provided  Taken 11/1/2023 1400 by Ying Alcaraz RN  Infection Prevention:   rest/sleep promoted   single patient room provided  Taken 11/1/2023 1200 by Ying Alcaraz RN  Infection Prevention:   single patient room provided   rest/sleep promoted  Taken 11/1/2023 1000 by Ying Alcaraz RN  Infection Prevention:   rest/sleep promoted   single patient room provided  Taken 11/1/2023 0756 by Ying Alcaraz RN  Infection Prevention:   single patient room provided   rest/sleep promoted  Goal: Optimal Comfort and Wellbeing  Outcome: Ongoing, Progressing  Intervention: Provide Person-Centered Care  Recent Flowsheet Documentation  Taken 11/1/2023 0756 by Ying Alcaraz RN  Trust Relationship/Rapport:   care explained   thoughts/feelings acknowledged  Goal: Readiness for Transition of Care  Outcome: Ongoing, Progressing     Problem: Fall Injury Risk  Goal: Absence of Fall and Fall-Related Injury  Outcome: Ongoing, Progressing  Intervention: Promote Injury-Free Environment  Recent Flowsheet Documentation  Taken 11/1/2023 1600 by Ying Alcaraz RN  Safety Promotion/Fall Prevention:   activity supervised   safety round/check completed   nonskid shoes/slippers when out of bed   fall prevention program maintained   assistive device/personal items within reach  Taken  11/1/2023 1400 by Ying Alcaraz RN  Safety Promotion/Fall Prevention:   activity supervised   safety round/check completed   nonskid shoes/slippers when out of bed   fall prevention program maintained   assistive device/personal items within reach  Taken 11/1/2023 1200 by Ying Alcaraz RN  Safety Promotion/Fall Prevention:   activity supervised   safety round/check completed   nonskid shoes/slippers when out of bed   fall prevention program maintained   assistive device/personal items within reach  Taken 11/1/2023 1000 by Ying Alcaraz RN  Safety Promotion/Fall Prevention:   activity supervised   safety round/check completed   nonskid shoes/slippers when out of bed   fall prevention program maintained   assistive device/personal items within reach  Taken 11/1/2023 0756 by Ying Alcaraz RN  Safety Promotion/Fall Prevention:   activity supervised   safety round/check completed   nonskid shoes/slippers when out of bed   fall prevention program maintained   assistive device/personal items within reach     Problem: Alcohol Withdrawal  Goal: Alcohol Withdrawal Symptom Control  Outcome: Ongoing, Progressing     Problem: Acute Neurologic Deterioration (Alcohol Withdrawal)  Goal: Optimal Neurologic Function  Outcome: Ongoing, Progressing  Intervention: Minimize or Manage Acute Neurologic Symptoms  Recent Flowsheet Documentation  Taken 11/1/2023 1200 by Ying Alcaraz, RN  Cerebral Perfusion Promotion: blood pressure monitored     Problem: Substance Misuse (Alcohol Withdrawal)  Goal: Readiness for Change Identified  Outcome: Ongoing, Progressing     Problem: Behavioral Health Comorbidity  Goal: Maintenance of Behavioral Health Symptom Control  Outcome: Ongoing, Progressing     Problem: Hypertension Comorbidity  Goal: Blood Pressure in Desired Range  Outcome: Ongoing, Progressing     Problem: VTE (Venous Thromboembolism)  Goal: VTE (Venous Thromboembolism) Symptom Resolution  Outcome: Ongoing, Progressing      Problem: Activity and Energy Impairment (Depressive Signs/Symptoms)  Goal: Optimized Energy Level (Depressive Signs/Symptoms)  Outcome: Ongoing, Progressing     Problem: Cognitive Impairment (Depressive Signs/Symptoms)  Goal: Optimized Cognitive Function  Outcome: Ongoing, Progressing     Problem: Decreased Participation/Engagement (Depressive Signs/Symptoms)  Goal: Increased Participation and Engagement (Depressive Signs/Symptoms)  Outcome: Ongoing, Progressing     Problem: Feelings of Worthlessness, Hopelessness or Excessive Guilt (Depressive Signs/Symptoms)  Goal: Enhanced Self-Esteem and Confidence (Depressive Signs/Symptoms)  Outcome: Ongoing, Progressing     Problem: Mood Impairment (Depressive Signs/Symptoms)  Goal: Improved Mood Symptoms (Depressive Signs/Symptoms)  Outcome: Ongoing, Progressing     Problem: Nutrition Imbalance (Depressive Signs/Symptoms)  Goal: Optimized Nutrition Intake  Outcome: Ongoing, Progressing     Problem: Psychomotor Impairment (Depressive Signs/Symptoms)  Goal: Improved Psychomotor Symptoms (Depressive Signs/Symptoms)  Outcome: Ongoing, Progressing     Problem: Sleep Disturbance (Depressive Signs/Symptoms)  Goal: Improved Sleep (Depressive Signs/Symptoms)  Outcome: Ongoing, Progressing   Goal Outcome Evaluation:  Plan of Care Reviewed With: patient        Progress: no change

## 2023-11-01 NOTE — PLAN OF CARE
Goal Outcome Evaluation:  Plan of Care Reviewed With: patient        Progress: improving  Outcome Evaluation: Pt seen for PT treatment today. Pt UIC when PT arrived and agreeable to participate. Pt is [place] with STS transfers with pt needing verbal  cues for UE support. Pt needing CGA/[place]ans with gait today. Pt continues to rely heavily on walker for stability. Pt cued to keep steps with in the walker and not too far out. Pt continues to have left foot drag with gait and needs cues  for pt to clear foot. Pt able to ambulate 2X40ft with a standing rest break. Will continue to progress pt as able.

## 2023-11-01 NOTE — PROGRESS NOTES
Name: Flako Coreas ADMIT: 10/26/2023   : 1965  PCP: Akash Rodriguez Jr., DO    MRN: [id number] LOS: 0 days   AGE/SEX: 58 y.o. male  ROOM: [facility]     Subjective   Subjective     Patient is sitting in his recliner and does not appear to be in major distress.  Denies nausea, vomiting abdominal pain, chest pain, shortness of breath. Has some scattered bruising on his arms from falling.       Objective   Objective   Vital Signs  Temp:  [97.9 °F (36.6 °C)-98.8 °F (37.1 °C)] 98.8 °F (37.1 °C)  Heart Rate:  [60-66] 62  Resp:  [18] 18  BP: (138-164)/(85-91) 138/85  SpO2:  [95 %-97 %] 96 %  on   ;   Device (Oxygen Therapy): room air  Body mass index is 29.66 kg/m².  Physical Exam  HEENT: PERRLA, extract movements intact, Scleras no icterus  Neck: Supple, no JVD  Cardiovascular: Regular rate and rhythm with normal S1 and S2  Respiratory: Fairly clear to auscultation bilaterally with no wheezes  GI: Soft, nontender, bowel sounds are present  Extremities: No edema, palpable pedal pulses  Neurologic: Grossly nonfocal, no facial asymmetry      Results Review     I reviewed the patient's new clinical results.  Results from last 7 days   Lab Units 11/01/23  0450 10/31/23  2019 10/31/23  1220 10/31/23  0538 10/30/23  1232 10/30/23  0433 10/29/23  1439 10/29/23  0424   WBC 10*3/mm3 7.56  --   --  6.10  --  7.12  --  6.95   HEMOGLOBIN g/dL 12.9*  12.9* 12.5* 12.8* 12.2*   < > 12.2*   < > 12.1*  12.1*   PLATELETS 10*3/mm3 247  --   --  205  --  205  --  204    < > = values in this interval not displayed.     Results from last 7 days   Lab Units 11/01/23  0450 10/31/23  0538 10/30/23  0433 10/29/23  0424   SODIUM mmol/L 140 140 140 141   POTASSIUM mmol/L 3.5 3.6 3.9 4.2   CHLORIDE mmol/L 108* 110* 109* 112*   CO2 mmol/L 22.1 22.5 21.0* 20.0*   BUN mg/dL 14 13 12 16   CREATININE mg/dL 1.00 0.94 1.00 0.97   GLUCOSE mg/dL 98 94 89 96   EGFR mL/min/1.73 88.3 95.1 88.3 91.6     Results from last 7 days   Lab Units  "10/26/23  2014   ALBUMIN g/dL 4.9   BILIRUBIN mg/dL 0.7   ALK PHOS U/L 73   AST (SGOT) U/L 24   ALT (SGPT) U/L 42*     Results from last 7 days   Lab Units 11/01/23  0450 10/31/23  0538 10/30/23  0433 10/29/23  0424 10/27/23  0421 10/26/23  2014   CALCIUM mg/dL 8.8 8.9 8.8 8.3*   < > 9.7   ALBUMIN g/dL  --   --   --   --   --  4.9    < > = values in this interval not displayed.       No results found for: \"HGBA1C\", \"POCGLU\"      No radiology results for the last day    I have personally reviewed all medications:  Scheduled Medications  amLODIPine, 5 mg, Oral, Daily  aspirin, 81 mg, Oral, Daily  atorvastatin, 80 mg, Oral, Nightly  carvedilol, 6.25 mg, Oral, BID With Meals  folic acid, 1 mg, Oral, Daily  hydrALAZINE, 100 mg, Oral, Q8H  lidocaine 1% - EPINEPHrine 1:[id number], 10 mL, Injection, Once  lisinopril, 40 mg, Oral, Daily  multivitamin with minerals, 1 tablet, Oral, Daily  pantoprazole, 40 mg, Oral, Q AM  senna-docusate sodium, 2 tablet, Oral, BID  sodium chloride, 10 mL, Intravenous, Q12H  thiamine (B-1) IV, 200 mg, Intravenous, Q8H   Followed by  [START ON 11/2/2023] thiamine, 100 mg, Oral, Daily  ticagrelor, 60 mg, Oral, BID  vilazodone, 40 mg, Oral, Daily    Infusions     Diet  Diet: Cardiac Diets; Healthy Heart (2-3 Na+); Texture: Regular Texture (IDDSI 7); Fluid Consistency: Thin (IDDSI 0)    I have personally reviewed:  [x]  Laboratory   []  Microbiology   []  Radiology   [x]  EKG/Telemetry  []  Cardiology/Vascular   []  Pathology    []  Records       Assessment/Plan     Active Hospital Problems    Diagnosis  POA    **Vertigo [R42]  Yes    Alcohol abuse [F10.10]  Unknown    Concussion [S06.0XAA]  Yes    Fall [W19.XXXA]  Yes    Head injury, closed, with concussion [S06.0XAA]  Yes    HTN (hypertension) [I10]  Yes    CVA (cerebral vascular accident) [I63.9]  Yes    Mixed hyperlipidemia [E78.2]  Yes      Resolved Hospital Problems    Diagnosis Date Resolved POA    TIA (transient ischemic attack) [G45.9] " 10/27/2023 Yes       58 y.o. male admitted with Vertigo.    1.Head injury/concussion secondary to vertigo from left cerebellar PICA severe stenosis, neurology did evaluate and MRI during this admission did not reveal any new acute infarcts or subacute infarcts.  Recommended BAR.  Continue with aspirin, Brilinta and statins.    2.  Subacute left cerebellar CVA diagnosed on 10/23/2023, treatment as mentioned above.    3.  Rectal bleeding, GI did evaluate today and offered further work-up however patient opted to postpone work-up until he can come off Brilinta which is 90 days from start date.  Follow-up with GI as an outpatient basis.    4.  Hypertension, on amlodipine, Coreg, hydralazine and monitor blood pressure closely.    5.  Ethanol abuse, will monitor for withdrawal and is on a CIWA protocol.  Continue with folate and thiamine supplements.  He was counseled extensively to quit drinking alcohol.  Receiving Ativan as needed. DC ativan/CIWA protocol and monitor.     6.  On SCDs for DVT prophylaxis.    7.  CODE STATUS is full code.    8.  Estimated discharge date, to rehab once a bed is available.    Copied text on this note has been reviewed by me on 11/1/2023      Edelmira Trujillo MD  [facility]  11/01/23  10:15 EDT

## 2023-11-01 NOTE — PROGRESS NOTES
[facility]    Called son to check status of who would be with pt while son is at work (works 7a-3p M-F- anticipate needing 24/7 asst after dc). Son does not have anyone that can stay with pt and reports unable to hire asst. Son is going to call pt to see if there are other contacts.   Also discussed subacute with son and encouraged him to visit today potential subacute rehabs if they are unable to come up with assistance at home.     Lo Han RN  Acute Rehab Admission Nurse

## 2023-11-01 NOTE — THERAPY TREATMENT NOTE
Patient Name: Flako Coreas  : 1965    MRN: [id number]                              Today's Date: 2023       Admit Date: 10/26/2023    Visit Dx:     ICD-10-CM ICD-9-CM   1. Vertigo  R42 780.4   2. Closed head injury, initial encounter  S09.90XA 959.01   3. Laceration of forehead, initial encounter  S01.81XA 873.42   4. Elevated serum creatinine  R79.89 790.99   5. 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    Acute CVA (cerebrovascular accident)    HTN (hypertension)    Late effect of cerebrovascular accident (CVA)    Concussion    Fall    Head injury, closed, with concussion    Alcohol abuse     Past Medical History:   Diagnosis Date    ADHD (attention deficit hyperactivity disorder)     On going issue    Anxiety     Lpee's esophagus     Benign prostatic hyperplasia Surgery in 2021    Clotting disorder Intestinal    Depression     Diverticulitis     Diverticulosis     Duodenitis     Enteritis     Failure to thrive (child)     Family history of blood clots     GERD (gastroesophageal reflux disease)     Hyperlipidemia     Hypertension     Hypertensive emergency     Low testosterone     Microcytosis     Pancreatitis     Pneumonia     PONV (postoperative nausea and vomiting)     Seasonal affective disorder     Shoulder injury     Stroke     Unexplained weight loss      Past Surgical History:   Procedure Laterality Date    COLON SURGERY      COLONOSCOPY      COLONOSCOPY N/A 2022    Procedure: COLONOSCOPY INTO CECUM WITH HOT SNARE POLYPECTOMY;  Surgeon: Albert Gallo  MD LIBBY;  Location: [facility] ENDOSCOPY;  Service: Gastroenterology;  Laterality: N/A;  PRE- GI BLEED  POST- DIVERTICULOSIS, POLYP    ENDOSCOPY N/A 12/10/2022    Procedure: ESOPHAGOGASTRODUODENOSCOPY;  Surgeon: Albert Gallo MD;  Location: [facility] ENDOSCOPY;  Service: Gastroenterology;  Laterality: N/A;  PRE- DARK STOOLS  POST- BARRETTS ESOPHAGUS    FRACTURE SURGERY  1980    for broken arm    FRACTURE SURGERY      for broken hand    INCISION AND DRAINAGE ABSCESS  2015    anal    PROSTATE SURGERY      SMALL INTESTINE SURGERY      TONSILLECTOMY        General Information       Row Name 11/01/23 1002          Physical Therapy Time and Intention    Document Type therapy note (daily note)  -EB     Mode of Treatment individual therapy;physical therapy  -EB       Row Name 11/01/23 1002          General Information    Patient Profile Reviewed yes  -EB     Existing Precautions/Restrictions fall  -EB       Row Name 11/01/23 1002          Cognition    Orientation Status (Cognition) oriented x 4  -EB       Row Name 11/01/23 1002          Safety Issues, Functional Mobility    Impairments Affecting Function (Mobility) balance;coordination;endurance/activity tolerance;strength  -EB               User Key  (r) = Recorded By, (t) = Taken By, (c) = Cosigned By      Initials Name Provider Type    EB Michelle Fraser PTA Physical Therapist Assistant                   Mobility       Row Name 11/01/23 1002          Bed Mobility    Comment, (Bed Mobility) NT-UIC  -EB       Row Name 11/01/23 1002          Sit-Stand Transfer    Sit-Stand King (Transfers) minimum assist (75% patient effort);verbal cues  -EB     Assistive Device (Sit-Stand Transfers) walker, front-wheeled  -EB     Comment, (Sit-Stand Transfer) cues for hand placement. To use arm rest of the chair. Improved with static standing balance, less unsteadiness.  -EB       Row Name 11/01/23 1002          Gait/Stairs (Locomotion)    King Level (Gait) contact guard;minimum  "assist (75% patient effort)  -EB     Assistive Device (Gait) walker, front-wheeled  -EB     Distance in Feet (Gait) 2X40ft  -EB     Deviations/Abnormal Patterns (Gait) base of support, narrow;gait speed decreased;stride length decreased  -EB     Bilateral Gait Deviations forward flexed posture  -EB     Left Sided Gait Deviations foot drop/toe drag;heel strike decreased  -EB     Comment, (Gait/Stairs) cues for pt to keep steps with in the walker and not to step out too far  from walker. Pt with improved RENÉ. Relies heavily on walker for support.  -EB               User Key  (r) = Recorded By, (t) = Taken By, (c) = Cosigned By      Initials Name Provider Type    Michelle Feliz PTA Physical Therapist Assistant                   Obj/Interventions       Row Name 11/01/23 1007          Motor Skills    Therapeutic Exercise --  BLE: AP, LAQs, seated marches (X10) pt c/o a \"weird\" feeling of the left LE with AP.  -EB               User Key  (r) = Recorded By, (t) = Taken By, (c) = Cosigned By      Initials Name Provider Type    Michelle Feliz PTA Physical Therapist Assistant                   Goals/Plan    No documentation.                  Clinical Impression       Row Name 11/01/23 1008          Plan of Care Review    Plan of Care Reviewed With patient  -EB     Progress improving  -EB     Outcome Evaluation Pt seen for PT treatment today. Pt [facility] when PT arrived and agreeable to participate. Pt is Evans with STS transfers with pt needing verbal  cues for UE support. Pt needing CGA/Evans with gait today. Pt continues to rely heavily on walker for stability. Pt cued to keep steps with in the walker and not too far out. Pt continues to have left foot drag with gait and needs cues  for pt to clear foot. Pt able to ambulate 2X40ft with a standing rest break. Will continue to progress pt as able.  -EB       Row Name 11/01/23 1008          Therapy Assessment/Plan (PT)    Therapy Frequency (PT) 6 times/wk  -EB       Row Name " 11/01/23 1008          Positioning and Restraints    Pre-Treatment Position sitting in chair/recliner  -EB     Post Treatment Position chair  -EB     In Chair reclined;call light within reach;encouraged to call for assist;exit alarm on  -EB               User Key  (r) = Recorded By, (t) = Taken By, (c) = Cosigned By      Initials Name Provider Type    Michelle Feliz PTA Physical Therapist Assistant                   Outcome Measures       Row Name 11/01/23 1015 11/01/23 0756       How much help from another person do you currently need...    Turning from your back to your side while in flat bed without using bedrails? 3  -EB 3  -MM    Moving from lying on back to sitting on the side of a flat bed without bedrails? 3  -EB 3  -MM    Moving to and from a bed to a chair (including a wheelchair)? 3  -EB 3  -MM    Standing up from a chair using your arms (e.g., wheelchair, bedside chair)? 3  -EB 3  -MM    Climbing 3-5 steps with a railing? 2  -EB 2  -MM    To walk in hospital room? 3  -EB 3  -MM    AM-PAC 6 Clicks Score (PT) 17  -EB 17  -MM    Highest level of mobility 5 --> Static standing  -EB 5 --> Static standing  -MM      Row Name 11/01/23 1015          Modified [place] Scale    Modified [place] Scale 4 - Moderately severe disability.  Unable to walk without assistance, and unable to attend to own bodily needs without assistance.  -EB               User Key  (r) = Recorded By, (t) = Taken By, (c) = Cosigned By      Initials Name Provider Type    Michelle Feliz PTA Physical Therapist Assistant    Ying Back RN Registered Nurse                                 Physical Therapy Education       Title: PT OT SLP Therapies (Done)       Topic: Physical Therapy (Done)       Point: Mobility training (Done)       Learning Progress Summary             Patient Acceptance, E,D, VU,NR by EDWAR at 11/1/2023 1015    Acceptance, E,D, VU,NR by EDWAR at 10/31/2023 0953    Acceptance, E, VU by DB at 10/30/2023 0907    Acceptance,  E,D, VU,NR by LW at 10/28/2023 1558    Acceptance, E, VU,NR by EM at 10/27/2023 1627                         Point: Home exercise program (Done)       Learning Progress Summary             Patient Acceptance, E,D, VU,NR by EB at 11/1/2023 1015    Acceptance, E,D, VU,NR by EB at 10/31/2023 0953    Acceptance, E, VU by DB at 10/30/2023 0907                         Point: Body mechanics (Done)       Learning Progress Summary             Patient Acceptance, E,D, VU,NR by EB at 11/1/2023 1015    Acceptance, E,D, VU,NR by EB at 10/31/2023 0953    Acceptance, E, VU by DB at 10/30/2023 0907                         Point: Precautions (Done)       Learning Progress Summary             Patient Acceptance, E,D, VU,NR by EB at 11/1/2023 1015    Acceptance, E,D, VU,NR by EB at 10/31/2023 0953    Acceptance, E, VU by DB at 10/30/2023 0907                                         User Key       Initials Effective Dates Name Provider Type Discipline    EM 06/16/21 -  Viviane Rivas, PT Physical Therapist PT    EB 02/14/23 -  Michelle Fraser, ALESHIA Physical Therapist Assistant PT    DB 06/16/21 -  Marianne Mccullough, PT Physical Therapist PT    LW 05/08/23 -  Margo Mario, PT Physical Therapist PT                  PT Recommendation and Plan     Plan of Care Reviewed With: patient  Progress: improving  Outcome Evaluation: Pt seen for PT treatment today. Pt [facility] when PT arrived and agreeable to participate. Pt is Evans with STS transfers with pt needing verbal  cues for UE support. Pt needing CGA/Evans with gait today. Pt continues to rely heavily on walker for stability. Pt cued to keep steps with in the walker and not too far out. Pt continues to have left foot drag with gait and needs cues  for pt to clear foot. Pt able to ambulate 2X40ft with a standing rest break. Will continue to progress pt as able.     Time Calculation:         PT Charges       Row Name 11/01/23 1001             Time Calculation    Start Time 0920  -EB      Stop  Time 0943  -EB      Time Calculation (min) 23 min  -EB      PT Received On 11/01/23  -EB      PT - Next Appointment 11/02/23  -EB         Time Calculation- PT    Total Timed Code Minutes- PT 23 minute(s)  -EB                User Key  (r) = Recorded By, (t) = Taken By, (c) = Cosigned By      Initials Name Provider Type    EB Michelle Fraser PTA Physical Therapist Assistant                  Therapy Charges for Today       Code Description Service Date Service Provider Modifiers Qty    07504820419 HC GAIT TRAINING EA 15 MIN 10/31/2023 Michelle Fraser, ALESHIA GP 1    11707752321 HC PT THERAPEUTIC ACT EA 15 MIN 10/31/2023 Michelle Fraser, ALESHIA GP 1    71609030899 HC GAIT TRAINING EA 15 MIN 11/1/2023 Michelle Fraser, ALESHIA GP 1    56754720752 HC PT THER PROC EA 15 MIN 11/1/2023 Michelle Fraser, ALESHIA GP 1            PT G-Codes  Outcome Measure Options: AM-PAC 6 Clicks Basic Mobility (PT)  AM-PAC 6 Clicks Score (PT): 17  AM-PAC 6 Clicks Score (OT): 19  Modified [place] Scale: 4 - Moderately severe disability.  Unable to walk without assistance, and unable to attend to own bodily needs without assistance.       Michelle Fraser PTA  11/1/2023

## 2023-11-01 NOTE — PLAN OF CARE
Goal Outcome Evaluation:  Plan of Care Reviewed With: patient        Progress: improving  Outcome Evaluation: Pt completed ADLs and standing dynamic balances exercises with OT today. He continues to have acute balance deficits limiting safety with all activites and increases risk of falls. Pt would continue to benefit from OT and dc to acute rehab to improve function.      Anticipated Discharge Disposition (OT): inpatient rehabilitation facility

## 2023-11-02 LAB
ANION GAP SERPL CALCULATED.3IONS-SCNC: 10.1 MMOL/L (ref 5–15)
BASOPHILS # BLD AUTO: 0.03 10*3/MM3 (ref 0–0.2)
BASOPHILS NFR BLD AUTO: 0.4 % (ref 0–1.5)
BUN SERPL-MCNC: 15 MG/DL (ref 6–20)
BUN/CREAT SERPL: 13.3 (ref 7–25)
CALCIUM SPEC-SCNC: 9.1 MG/DL (ref 8.6–10.5)
CHLORIDE SERPL-SCNC: 107 MMOL/L (ref 98–107)
CO2 SERPL-SCNC: 25.9 MMOL/L (ref 22–29)
CREAT SERPL-MCNC: 1.13 MG/DL (ref 0.76–1.27)
DEPRECATED RDW RBC AUTO: 42.2 FL (ref 37–54)
EGFRCR SERPLBLD CKD-EPI 2021: 76.3 ML/MIN/1.73
EOSINOPHIL # BLD AUTO: 0.45 10*3/MM3 (ref 0–0.4)
EOSINOPHIL NFR BLD AUTO: 6.3 % (ref 0.3–6.2)
ERYTHROCYTE [DISTWIDTH] IN BLOOD BY AUTOMATED COUNT: 14.3 % (ref 12.3–15.4)
GLUCOSE SERPL-MCNC: 95 MG/DL (ref 65–99)
HCT VFR BLD AUTO: 38.6 % (ref 37.5–51)
HGB BLD-MCNC: 13 G/DL (ref 13–17.7)
IMM GRANULOCYTES # BLD AUTO: 0.02 10*3/MM3 (ref 0–0.05)
IMM GRANULOCYTES NFR BLD AUTO: 0.3 % (ref 0–0.5)
LYMPHOCYTES # BLD AUTO: 1.37 10*3/MM3 (ref 0.7–3.1)
LYMPHOCYTES NFR BLD AUTO: 19.1 % (ref 19.6–45.3)
MCH RBC QN AUTO: 27.4 PG (ref 26.6–33)
MCHC RBC AUTO-ENTMCNC: 33.7 G/DL (ref 31.5–35.7)
MCV RBC AUTO: 81.4 FL (ref 79–97)
MONOCYTES # BLD AUTO: 0.6 10*3/MM3 (ref 0.1–0.9)
MONOCYTES NFR BLD AUTO: 8.3 % (ref 5–12)
NEUTROPHILS NFR BLD AUTO: 4.72 10*3/MM3 (ref 1.7–7)
NEUTROPHILS NFR BLD AUTO: 65.6 % (ref 42.7–76)
NRBC BLD AUTO-RTO: 0 /100 WBC (ref 0–0.2)
PLATELET # BLD AUTO: 258 10*3/MM3 (ref 140–450)
PMV BLD AUTO: 10 FL (ref 6–12)
POTASSIUM SERPL-SCNC: 4.1 MMOL/L (ref 3.5–5.2)
RBC # BLD AUTO: 4.74 10*6/MM3 (ref 4.14–5.8)
SODIUM SERPL-SCNC: 143 MMOL/L (ref 136–145)
WBC NRBC COR # BLD: 7.19 10*3/MM3 (ref 3.4–10.8)

## 2023-11-02 PROCEDURE — G0378 HOSPITAL OBSERVATION PER HR: HCPCS

## 2023-11-02 PROCEDURE — 80048 BASIC METABOLIC PNL TOTAL CA: CPT | Performed by: STUDENT IN AN ORGANIZED HEALTH CARE EDUCATION/TRAINING PROGRAM

## 2023-11-02 PROCEDURE — 97116 GAIT TRAINING THERAPY: CPT

## 2023-11-02 PROCEDURE — 85025 COMPLETE CBC W/AUTO DIFF WBC: CPT | Performed by: INTERNAL MEDICINE

## 2023-11-02 RX ORDER — CHOLECALCIFEROL (VITAMIN D3) 125 MCG
10 CAPSULE ORAL NIGHTLY
Status: DISCONTINUED | OUTPATIENT
Start: 2023-11-02 | End: 2023-11-06 | Stop reason: HOSPADM

## 2023-11-02 RX ORDER — HYDROXYZINE HYDROCHLORIDE 25 MG/1
50 TABLET, FILM COATED ORAL 3 TIMES DAILY PRN
Status: DISCONTINUED | OUTPATIENT
Start: 2023-11-02 | End: 2023-11-06 | Stop reason: HOSPADM

## 2023-11-02 RX ADMIN — HYDRALAZINE HYDROCHLORIDE 100 MG: 50 TABLET ORAL at 20:11

## 2023-11-02 RX ADMIN — PANTOPRAZOLE SODIUM 40 MG: 40 TABLET, DELAYED RELEASE ORAL at 05:47

## 2023-11-02 RX ADMIN — SENNOSIDES AND DOCUSATE SODIUM 2 TABLET: 50; 8.6 TABLET ORAL at 09:15

## 2023-11-02 RX ADMIN — SENNOSIDES AND DOCUSATE SODIUM 2 TABLET: 50; 8.6 TABLET ORAL at 20:11

## 2023-11-02 RX ADMIN — TICAGRELOR 60 MG: 60 TABLET ORAL at 20:11

## 2023-11-02 RX ADMIN — MULTIPLE VITAMINS W/ MINERALS TAB 1 TABLET: TAB at 08:52

## 2023-11-02 RX ADMIN — Medication 10 ML: at 09:15

## 2023-11-02 RX ADMIN — Medication 10 MG: at 20:10

## 2023-11-02 RX ADMIN — HYDROXYZINE HYDROCHLORIDE 50 MG: 25 TABLET ORAL at 21:01

## 2023-11-02 RX ADMIN — HYDROXYZINE HYDROCHLORIDE 50 MG: 25 TABLET ORAL at 13:38

## 2023-11-02 RX ADMIN — VILAZODONE HYDROCHLORIDE 40 MG: 40 TABLET, FILM COATED ORAL at 08:52

## 2023-11-02 RX ADMIN — ASPIRIN 81 MG: 81 TABLET, COATED ORAL at 09:15

## 2023-11-02 RX ADMIN — FOLIC ACID 1 MG: 1 TABLET ORAL at 08:53

## 2023-11-02 RX ADMIN — ATORVASTATIN CALCIUM 80 MG: 80 TABLET, FILM COATED ORAL at 20:11

## 2023-11-02 RX ADMIN — CARVEDILOL 6.25 MG: 6.25 TABLET, FILM COATED ORAL at 08:51

## 2023-11-02 RX ADMIN — Medication 100 MG: at 08:53

## 2023-11-02 RX ADMIN — AMLODIPINE BESYLATE 5 MG: 5 TABLET ORAL at 08:52

## 2023-11-02 RX ADMIN — HYDRALAZINE HYDROCHLORIDE 100 MG: 50 TABLET ORAL at 13:38

## 2023-11-02 RX ADMIN — TICAGRELOR 60 MG: 60 TABLET ORAL at 08:52

## 2023-11-02 RX ADMIN — Medication 10 ML: at 20:10

## 2023-11-02 RX ADMIN — LISINOPRIL 40 MG: 20 TABLET ORAL at 08:52

## 2023-11-02 RX ADMIN — CARVEDILOL 6.25 MG: 6.25 TABLET, FILM COATED ORAL at 17:41

## 2023-11-02 RX ADMIN — HYDRALAZINE HYDROCHLORIDE 100 MG: 50 TABLET ORAL at 05:47

## 2023-11-02 NOTE — THERAPY TREATMENT NOTE
Patient Name: Flako Coreas  : 1965    MRN: [id number]                              Today's Date: 2023       Admit Date: 10/26/2023    Visit Dx:     ICD-10-CM ICD-9-CM   1. Vertigo  R42 780.4   2. Closed head injury, initial encounter  S09.90XA 959.01   3. Laceration of forehead, initial encounter  S01.81XA 873.42   4. Elevated serum creatinine  R79.89 790.99   5. 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    Acute CVA (cerebrovascular accident)    HTN (hypertension)    Late effect of cerebrovascular accident (CVA)    Concussion    Fall    Head injury, closed, with concussion    Alcohol abuse     Past Medical History:   Diagnosis Date    ADHD (attention deficit hyperactivity disorder)     On going issue    Anxiety     Lepe's esophagus     Benign prostatic hyperplasia Surgery in 2021    Clotting disorder Intestinal    Depression     Diverticulitis     Diverticulosis     Duodenitis     Enteritis     Failure to thrive (child)     Family history of blood clots     GERD (gastroesophageal reflux disease)     Hyperlipidemia     Hypertension     Hypertensive emergency     Low testosterone     Microcytosis     Pancreatitis     Pneumonia     PONV (postoperative nausea and vomiting)     Seasonal affective disorder     Shoulder injury     Stroke     Unexplained weight loss      Past Surgical History:   Procedure Laterality Date    COLON SURGERY      COLONOSCOPY      COLONOSCOPY N/A 2022    Procedure: COLONOSCOPY INTO CECUM WITH HOT SNARE POLYPECTOMY;  Surgeon: Albert Gallo  MD LIBBY;  Location: [facility] ENDOSCOPY;  Service: Gastroenterology;  Laterality: N/A;  PRE- GI BLEED  POST- DIVERTICULOSIS, POLYP    ENDOSCOPY N/A 12/10/2022    Procedure: ESOPHAGOGASTRODUODENOSCOPY;  Surgeon: Albert Gallo MD;  Location: [facility] ENDOSCOPY;  Service: Gastroenterology;  Laterality: N/A;  PRE- DARK STOOLS  POST- BARRETTS ESOPHAGUS    FRACTURE SURGERY  1980    for broken arm    FRACTURE SURGERY      for broken hand    INCISION AND DRAINAGE ABSCESS  2015    anal    PROSTATE SURGERY      SMALL INTESTINE SURGERY      TONSILLECTOMY        General Information       Row Name 11/02/23 1604          Physical Therapy Time and Intention    Document Type therapy note (daily note)  -     Mode of Treatment individual therapy;physical therapy  -       Row Name 11/02/23 1604          General Information    Patient Profile Reviewed yes  -     Existing Precautions/Restrictions fall  -       Row Name 11/02/23 1604          Cognition    Orientation Status (Cognition) oriented x 4  -       Row Name 11/02/23 1604          Safety Issues, Functional Mobility    Impairments Affecting Function (Mobility) balance;endurance/activity tolerance;coordination;strength  -               User Key  (r) = Recorded By, (t) = Taken By, (c) = Cosigned By      Initials Name Provider Type     Michelle Fraser PTA Physical Therapist Assistant                   Mobility       Row Name 11/02/23 1605          Bed Mobility    All Activities, [place] (Bed Mobility) standby assist  -     Assistive Device (Bed Mobility) bed rails;head of bed elevated  -       Row Name 11/02/23 1605          Sit-Stand Transfer    Sit-Stand [place] (Transfers) contact guard  -     Assistive Device (Sit-Stand Transfers) walker, front-wheeled  -       Row Name 11/02/23 1605          Gait/Stairs (Locomotion)    [place] Level (Gait) contact guard;minimum assist (75% patient effort)  -     Assistive Device (Gait) walker, front-wheeled  -      Distance in Feet (Gait) 2X50ft  -EB     Deviations/Abnormal Patterns (Gait) base of support, narrow;gait speed decreased;stride length decreased  -EB     Bilateral Gait Deviations forward flexed posture  -EB     Left Sided Gait Deviations foot drop/toe drag;heel strike decreased  -EB     Comment, (Gait/Stairs) pt continues to improve with gait but relies heavily on walker when starting to fatigue.  -EB               User Key  (r) = Recorded By, (t) = Taken By, (c) = Cosigned By      Initials Name Provider Type    Michelle Feliz PTA Physical Therapist Assistant                   Obj/Interventions    No documentation.                  Goals/Plan    No documentation.                  Clinical Impression       Row Name 11/02/23 1605          Plan of Care Review    Plan of Care Reviewed With patient  -EB     Progress improving  -EB     Outcome Evaluation Pt seen for PT treatment today. Pt continues to be SBA with bed mobility and CGA with STS transfers. Pt ambulated 2X50ft with rwx, CGA/Evans. Pt is improving with gait but still fatigues quickly and relies heavily on walker. Pt  is working on improving RENÉ, stride length and left toe drag. Pt continues to be a fall risk and will need acute rehab before home. Will continue to progress pt as able.  -EB       Row Name 11/02/23 1605          Therapy Assessment/Plan (PT)    Therapy Frequency (PT) 6 times/wk  -EB       Row Name 11/02/23 1605          Positioning and Restraints    Pre-Treatment Position in bed  -EB     Post Treatment Position bed  -EB     In Bed supine;call light within reach;encouraged to call for assist;exit alarm on  -EB               User Key  (r) = Recorded By, (t) = Taken By, (c) = Cosigned By      Initials Name Provider Type    Michelle Feliz PTA Physical Therapist Assistant                   Outcome Measures       Row Name 11/02/23 1608 11/02/23 1604       How much help from another person do you currently need...    Turning from your back to  your side while in flat bed without using bedrails? 3  -EB 4  -EB    Moving from lying on back to sitting on the side of a flat bed without bedrails? 3  -EB 3  -EB    Moving to and from a bed to a chair (including a wheelchair)? 3  -EB 3  -EB    Standing up from a chair using your arms (e.g., wheelchair, bedside chair)? 3  -EB 3  -EB    Climbing 3-5 steps with a railing? 2  -EB 2  -EB    To walk in hospital room? 3  -EB 3  -EB    AM-PAC 6 Clicks Score (PT) 17  -EB 18  -EB    Highest level of mobility 5 --> Static standing  -EB 6 --> Walked 10 steps or more  -EB      Row Name 11/02/23 0810          How much help from another person do you currently need...    Turning from your back to your side while in flat bed without using bedrails? 4  -MM     Moving from lying on back to sitting on the side of a flat bed without bedrails? 3  -MM     Moving to and from a bed to a chair (including a wheelchair)? 3  -MM     Standing up from a chair using your arms (e.g., wheelchair, bedside chair)? 3  -MM     Climbing 3-5 steps with a railing? 2  -MM     To walk in hospital room? 3  -MM     AM-PAC 6 Clicks Score (PT) 18  -MM     Highest level of mobility 6 --> Walked 10 steps or more  -MM       Row Name 11/02/23 1604          Modified [place] Scale    Modified [place] Scale 4 - Moderately severe disability.  Unable to walk without assistance, and unable to attend to own bodily needs without assistance.  -EB               User Key  (r) = Recorded By, (t) = Taken By, (c) = Cosigned By      Initials Name Provider Type    Michelle Feliz PTA Physical Therapist Assistant    Ying Back, RN Registered Nurse                                 Physical Therapy Education       Title: PT OT SLP Therapies (Done)       Topic: Physical Therapy (Done)       Point: Mobility training (Done)       Learning Progress Summary             Patient Acceptance, E, VU,NR by EB at 11/2/2023 1608    Acceptance, E,D, VU,NR by EB at 11/2/2023 1604     Acceptance, E,D, VU,NR by EB at 11/1/2023 1015    Acceptance, E,D, VU,NR by EB at 10/31/2023 0953    Acceptance, E, VU by DB at 10/30/2023 0907    Acceptance, E,D, VU,NR by LW at 10/28/2023 1558    Acceptance, E, VU,NR by EM at 10/27/2023 1627                         Point: Home exercise program (Done)       Learning Progress Summary             Patient Acceptance, E,D, VU,NR by EB at 11/1/2023 1015    Acceptance, E,D, VU,NR by EB at 10/31/2023 0953    Acceptance, E, VU by DB at 10/30/2023 0907                         Point: Body mechanics (Done)       Learning Progress Summary             Patient Acceptance, E, VU,NR by EB at 11/2/2023 1608    Acceptance, E,D, VU,NR by EB at 11/2/2023 1604    Acceptance, E,D, VU,NR by EB at 11/1/2023 1015    Acceptance, E,D, VU,NR by EB at 10/31/2023 0953    Acceptance, E, VU by DB at 10/30/2023 0907                         Point: Precautions (Done)       Learning Progress Summary             Patient Acceptance, E, VU,NR by EB at 11/2/2023 1608    Acceptance, E,D, VU,NR by EB at 11/2/2023 1604    Acceptance, E,D, VU,NR by EB at 11/1/2023 1015    Acceptance, E,D, VU,NR by EB at 10/31/2023 0953    Acceptance, E, VU by DB at 10/30/2023 0907                                         User Key       Initials Effective Dates Name Provider Type Discipline    EM 06/16/21 -  Viviane Rivas, PT Physical Therapist PT    EB 02/14/23 -  Michelle Fraser, PTA Physical Therapist Assistant PT    DB 06/16/21 -  Marianne Mccullough, PT Physical Therapist PT    LW 05/08/23 -  Margo Mario, CARIN Physical Therapist PT                  PT Recommendation and Plan     Plan of Care Reviewed With: patient  Progress: improving  Outcome Evaluation: Pt seen for PT treatment today. Pt continues to be SBA with bed mobility and CGA with STS transfers. Pt ambulated 2X50ft with rwx, CGA/Evans. Pt is improving with gait but still fatigues quickly and relies heavily on walker. Pt  is working on improving RENÉ, stride length  and left toe drag. Pt continues to be a fall risk and will need acute rehab before home. Will continue to progress pt as able.     Time Calculation:         PT Charges       Row Name 11/02/23 1603             Time Calculation    Start Time 1536  -EB      Stop Time 1546  -EB      Time Calculation (min) 10 min  -EB      PT Received On 11/02/23  -EB      PT - Next Appointment 11/03/23  -EB         Time Calculation- PT    Total Timed Code Minutes- PT 10 minute(s)  -EB                User Key  (r) = Recorded By, (t) = Taken By, (c) = Cosigned By      Initials Name Provider Type    EB Michelle Fraser PTA Physical Therapist Assistant                  Therapy Charges for Today       Code Description Service Date Service Provider Modifiers Qty    92488845887 HC GAIT TRAINING EA 15 MIN 11/1/2023 Michelle Fraser, ALESHIA GP 1    04524015004 HC PT THER PROC EA 15 MIN 11/1/2023 Michelle Fraser PTA GP 1    07186795642 HC GAIT TRAINING EA 15 MIN 11/2/2023 Michelle Fraser PTA GP 1            PT G-Codes  Outcome Measure Options: AM-PAC 6 Clicks Daily Activity (OT)  AM-PAC 6 Clicks Score (PT): 17  AM-PAC 6 Clicks Score (OT): 19  Modified [place] Scale: 4 - Moderately severe disability.  Unable to walk without assistance, and unable to attend to own bodily needs without assistance.       Michelle Fraser PTA  11/2/2023

## 2023-11-02 NOTE — PLAN OF CARE
Goal Outcome Evaluation:  Plan of Care Reviewed With: patient        Progress: improving  Outcome Evaluation: Pt seen for PT treatment today. Pt continues to be SBA with bed mobility and CGA with STS transfers. Pt ambulated 2X50ft with rwx, CGA/Evans. Pt is improving with gait but still fatigues quickly and relies heavily on walker. Pt  is working on improving RENÉ, stride length and left toe drag. Pt continues to be a fall risk and will need acute rehab before home. Will continue to progress pt as able.

## 2023-11-02 NOTE — PROGRESS NOTES
Name: Flako Coreas ADMIT: 10/26/2023   : 1965  PCP: Akash Rodriguez Jr., DO    MRN: [id number] LOS: 0 days   AGE/SEX: 58 y.o. male  ROOM: [facility]     Subjective   Subjective     Patient is sitting in his bed, no complaints. He is asking about a sleep aide at bedtime.   Denies nausea, vomiting abdominal pain, chest pain, shortness of breath. Has some scattered bruising on his arms from falling.       Objective   Objective   Vital Signs  Temp:  [98.2 °F (36.8 °C)-98.6 °F (37 °C)] 98.6 °F (37 °C)  Heart Rate:  [59-69] 65  Resp:  [16-18] 18  BP: (130-165)/(82-96) 163/95  SpO2:  [96 %-97 %] 96 %  on   ;   Device (Oxygen Therapy): room air  Body mass index is 29.66 kg/m².  Physical Exam  HEENT: PERRLA, extract movements intact, Scleras no icterus  Neck: Supple, no JVD  Cardiovascular: Regular rate and rhythm with normal S1 and S2  Respiratory: Fairly clear to auscultation bilaterally with no wheezes  GI: Soft, nontender, bowel sounds are present  Extremities: No edema, palpable pedal pulses  Neurologic: Grossly nonfocal, no facial asymmetry      Results Review     I reviewed the patient's new clinical results.  Results from last 7 days   Lab Units 23  0710 11/01/23  0450 10/31/23  2019 10/31/23  1220 10/31/23  0538 10/30/23  1232 10/30/23  0433   WBC 10*3/mm3 7.19 7.56  --   --  6.10  --  7.12   HEMOGLOBIN g/dL 13.0 12.9*  12.9* 12.5* 12.8* 12.2*   < > 12.2*   PLATELETS 10*3/mm3 258 247  --   --  205  --  205    < > = values in this interval not displayed.     Results from last 7 days   Lab Units 23  0710 11/01/23  0450 10/31/23  0538 10/30/23  0433   SODIUM mmol/L 143 140 140 140   POTASSIUM mmol/L 4.1 3.5 3.6 3.9   CHLORIDE mmol/L 107 108* 110* 109*   CO2 mmol/L 25.9 22.1 22.5 21.0*   BUN mg/dL 15 14 13 12   CREATININE mg/dL 1.13 1.00 0.94 1.00   GLUCOSE mg/dL 95 98 94 89   EGFR mL/min/1.73 76.3 88.3 95.1 88.3     Results from last 7 days   Lab Units 10/26/23  2014   ALBUMIN g/dL 4.9  "  BILIRUBIN mg/dL 0.7   ALK PHOS U/L 73   AST (SGOT) U/L 24   ALT (SGPT) U/L 42*     Results from last 7 days   Lab Units 11/02/23  0710 11/01/23  0450 10/31/23  0538 10/30/23  0433 10/27/23  0421 10/26/23  2014   CALCIUM mg/dL 9.1 8.8 8.9 8.8   < > 9.7   ALBUMIN g/dL  --   --   --   --   --  4.9    < > = values in this interval not displayed.       No results found for: \"HGBA1C\", \"POCGLU\"      No radiology results for the last day    I have personally reviewed all medications:  Scheduled Medications  amLODIPine, 5 mg, Oral, Daily  aspirin, 81 mg, Oral, Daily  atorvastatin, 80 mg, Oral, Nightly  carvedilol, 6.25 mg, Oral, BID With Meals  folic acid, 1 mg, Oral, Daily  hydrALAZINE, 100 mg, Oral, Q8H  lidocaine 1% - EPINEPHrine 1:[id number], 10 mL, Injection, Once  lisinopril, 40 mg, Oral, Daily  multivitamin with minerals, 1 tablet, Oral, Daily  pantoprazole, 40 mg, Oral, Q AM  senna-docusate sodium, 2 tablet, Oral, BID  sodium chloride, 10 mL, Intravenous, Q12H  thiamine, 100 mg, Oral, Daily  ticagrelor, 60 mg, Oral, BID  vilazodone, 40 mg, Oral, Daily    Infusions     Diet  Diet: Cardiac Diets; Healthy Heart (2-3 Na+); Texture: Regular Texture (IDDSI 7); Fluid Consistency: Thin (IDDSI 0)    I have personally reviewed:  [x]  Laboratory   []  Microbiology   []  Radiology   [x]  EKG/Telemetry  []  Cardiology/Vascular   []  Pathology    []  Records       Assessment/Plan     Active Hospital Problems    Diagnosis  POA    **Vertigo [R42]  Yes    Alcohol abuse [F10.10]  Unknown    Concussion [S06.0XAA]  Yes    Fall [W19.XXXA]  Yes    Head injury, closed, with concussion [S06.0XAA]  Yes    HTN (hypertension) [I10]  Yes    CVA (cerebral vascular accident) [I63.9]  Yes    Mixed hyperlipidemia [E78.2]  Yes      Resolved Hospital Problems    Diagnosis Date Resolved POA    TIA (transient ischemic attack) [G45.9] 10/27/2023 Yes       58 y.o. male admitted with Vertigo.    1.Head injury/concussion secondary to vertigo from left " cerebellar PICA severe stenosis, neurology did evaluate and MRI during this admission did not reveal any new acute infarcts or subacute infarcts.  Recommended BAR.  Continue with aspirin, Brilinta and statins. BAR evaluating.     2.  Subacute left cerebellar CVA diagnosed on 10/23/2023, treatment as mentioned above.    3.  Rectal bleeding, GI did evaluate and offered further work-up however patient opted to postpone work-up until he can come off Brilinta which is 90 days from start date.  Follow-up with GI as an outpatient basis.    4.  Hypertension, on amlodipine, Coreg, hydralazine and monitor blood pressure closely.    5.  Ethanol abuse, will monitor for withdrawal and is on a CIWA protocol.  Continue with folate and thiamine supplements.  He was counseled extensively to quit drinking alcohol.  Receiving Ativan as needed. DC ativan/CIWA protocol and monitor.     6.  On SCDs for DVT prophylaxis.    7.  CODE STATUS is full code.    8.  Estimated discharge date, to rehab once a bed is available.    Copied text on this note has been reviewed by me on 11/02/23        Edelmira Trujillo MD  [facility]  11/02/23  14:27 EDT

## 2023-11-02 NOTE — PLAN OF CARE
Problem: Adult Inpatient Plan of Care  Goal: Plan of Care Review  Outcome: Ongoing, Progressing  Flowsheets (Taken 11/2/2023 1601)  Progress: no change  Plan of Care Reviewed With: patient  Goal: Patient-Specific Goal (Individualized)  Outcome: Ongoing, Progressing  Goal: Absence of Hospital-Acquired Illness or Injury  Outcome: Ongoing, Progressing  Intervention: Identify and Manage Fall Risk  Recent Flowsheet Documentation  Taken 11/2/2023 1549 by Ying Alcaraz RN  Safety Promotion/Fall Prevention:   activity supervised   safety round/check completed   nonskid shoes/slippers when out of bed   fall prevention program maintained   assistive device/personal items within reach  Taken 11/2/2023 1400 by Ying Alcaraz RN  Safety Promotion/Fall Prevention: safety round/check completed  Taken 11/2/2023 1200 by Ying Alcaraz RN  Safety Promotion/Fall Prevention:   activity supervised   safety round/check completed   nonskid shoes/slippers when out of bed   fall prevention program maintained   assistive device/personal items within reach  Taken 11/2/2023 1000 by Ying Alcaraz RN  Safety Promotion/Fall Prevention:   activity supervised   safety round/check completed   nonskid shoes/slippers when out of bed   fall prevention program maintained   assistive device/personal items within reach  Taken 11/2/2023 0810 by Ying Alcaraz RN  Safety Promotion/Fall Prevention:   activity supervised   safety round/check completed   nonskid shoes/slippers when out of bed   fall prevention program maintained   assistive device/personal items within reach  Intervention: Prevent Skin Injury  Recent Flowsheet Documentation  Taken 11/2/2023 1549 by Ying Alcaraz RN  Body Position: position changed independently  Taken 11/2/2023 1400 by Ying Alcaraz RN  Body Position: position changed independently  Taken 11/2/2023 1200 by Ying Alcaraz RN  Body Position: position changed independently  Taken 11/2/2023 1000 by  Ying Alcaraz RN  Body Position: position changed independently  Taken 11/2/2023 0810 by Ying Alcaraz RN  Body Position: position changed independently  Intervention: Prevent and Manage VTE (Venous Thromboembolism) Risk  Recent Flowsheet Documentation  Taken 11/2/2023 1000 by Ying Alcaraz RN  Activity Management: activity encouraged  Intervention: Prevent Infection  Recent Flowsheet Documentation  Taken 11/2/2023 1549 by Ying Alcaraz RN  Infection Prevention:   rest/sleep promoted   single patient room provided  Taken 11/2/2023 1400 by Ying Alcaraz RN  Infection Prevention:   rest/sleep promoted   single patient room provided  Taken 11/2/2023 1200 by Ying Alcaraz RN  Infection Prevention:   single patient room provided   rest/sleep promoted  Taken 11/2/2023 1000 by Ying Alcaraz RN  Infection Prevention:   rest/sleep promoted   single patient room provided  Taken 11/2/2023 0810 by Ying Alcaraz RN  Infection Prevention:   rest/sleep promoted   single patient room provided  Goal: Optimal Comfort and Wellbeing  Outcome: Ongoing, Progressing  Intervention: Provide Person-Centered Care  Recent Flowsheet Documentation  Taken 11/2/2023 0810 by Ying Alcaraz RN  Trust Relationship/Rapport:   care explained   thoughts/feelings acknowledged  Goal: Readiness for Transition of Care  Outcome: Ongoing, Progressing     Problem: Fall Injury Risk  Goal: Absence of Fall and Fall-Related Injury  Outcome: Ongoing, Progressing  Intervention: Promote Injury-Free Environment  Recent Flowsheet Documentation  Taken 11/2/2023 1549 by Ying Alcaraz RN  Safety Promotion/Fall Prevention:   activity supervised   safety round/check completed   nonskid shoes/slippers when out of bed   fall prevention program maintained   assistive device/personal items within reach  Taken 11/2/2023 1400 by Ying Alcaraz RN  Safety Promotion/Fall Prevention: safety round/check completed  Taken 11/2/2023 1200 by  Ying Alcaraz, RN  Safety Promotion/Fall Prevention:   activity supervised   safety round/check completed   nonskid shoes/slippers when out of bed   fall prevention program maintained   assistive device/personal items within reach  Taken 11/2/2023 1000 by Ying Alcaraz, RN  Safety Promotion/Fall Prevention:   activity supervised   safety round/check completed   nonskid shoes/slippers when out of bed   fall prevention program maintained   assistive device/personal items within reach  Taken 11/2/2023 0810 by Ying Alcaraz, RN  Safety Promotion/Fall Prevention:   activity supervised   safety round/check completed   nonskid shoes/slippers when out of bed   fall prevention program maintained   assistive device/personal items within reach     Problem: Alcohol Withdrawal  Goal: Alcohol Withdrawal Symptom Control  Outcome: Ongoing, Progressing     Problem: Acute Neurologic Deterioration (Alcohol Withdrawal)  Goal: Optimal Neurologic Function  Outcome: Ongoing, Progressing     Problem: Substance Misuse (Alcohol Withdrawal)  Goal: Readiness for Change Identified  Outcome: Ongoing, Progressing     Problem: Behavioral Health Comorbidity  Goal: Maintenance of Behavioral Health Symptom Control  Outcome: Ongoing, Progressing     Problem: Hypertension Comorbidity  Goal: Blood Pressure in Desired Range  Outcome: Ongoing, Progressing     Problem: VTE (Venous Thromboembolism)  Goal: VTE (Venous Thromboembolism) Symptom Resolution  Outcome: Ongoing, Progressing     Problem: Activity and Energy Impairment (Depressive Signs/Symptoms)  Goal: Optimized Energy Level (Depressive Signs/Symptoms)  Outcome: Ongoing, Progressing     Problem: Cognitive Impairment (Depressive Signs/Symptoms)  Goal: Optimized Cognitive Function  Outcome: Ongoing, Progressing     Problem: Decreased Participation/Engagement (Depressive Signs/Symptoms)  Goal: Increased Participation and Engagement (Depressive Signs/Symptoms)  Outcome: Ongoing, Progressing      Problem: Feelings of Worthlessness, Hopelessness or Excessive Guilt (Depressive Signs/Symptoms)  Goal: Enhanced Self-Esteem and Confidence (Depressive Signs/Symptoms)  Outcome: Ongoing, Progressing     Problem: Mood Impairment (Depressive Signs/Symptoms)  Goal: Improved Mood Symptoms (Depressive Signs/Symptoms)  Outcome: Ongoing, Progressing     Problem: Nutrition Imbalance (Depressive Signs/Symptoms)  Goal: Optimized Nutrition Intake  Outcome: Ongoing, Progressing     Problem: Psychomotor Impairment (Depressive Signs/Symptoms)  Goal: Improved Psychomotor Symptoms (Depressive Signs/Symptoms)  Outcome: Ongoing, Progressing     Problem: Sleep Disturbance (Depressive Signs/Symptoms)  Goal: Improved Sleep (Depressive Signs/Symptoms)  Outcome: Ongoing, Progressing   Goal Outcome Evaluation:  Plan of Care Reviewed With: patient        Progress: no change

## 2023-11-02 NOTE — PLAN OF CARE
Problem: Adult Inpatient Plan of Care  Goal: Plan of Care Review  Outcome: Ongoing, Progressing  Flowsheets (Taken 11/2/2023 0256)  Plan of Care Reviewed With: patient  Goal: Patient-Specific Goal (Individualized)  Outcome: Ongoing, Progressing  Goal: Absence of Hospital-Acquired Illness or Injury  Outcome: Ongoing, Progressing  Intervention: Identify and Manage Fall Risk  Recent Flowsheet Documentation  Taken 11/2/2023 0200 by Ashley Marinelli RN  Safety Promotion/Fall Prevention: safety round/check completed  Taken 11/2/2023 0000 by Ashley Marinelli RN  Safety Promotion/Fall Prevention: safety round/check completed  Taken 11/1/2023 2200 by Ashley Marinelli RN  Safety Promotion/Fall Prevention: safety round/check completed  Taken 11/1/2023 2000 by Ashley Marinelli RN  Safety Promotion/Fall Prevention: safety round/check completed  Intervention: Prevent Skin Injury  Recent Flowsheet Documentation  Taken 11/2/2023 0200 by Ashley Marinelli RN  Body Position: position changed independently  Taken 11/2/2023 0000 by Ashley Marinelli RN  Body Position: position changed independently  Taken 11/1/2023 2200 by Ashley Marinelli RN  Body Position: position changed independently  Taken 11/1/2023 2000 by Ashley Marinelli RN  Body Position: position changed independently  Skin Protection: adhesive use limited  Intervention: Prevent and Manage VTE (Venous Thromboembolism) Risk  Recent Flowsheet Documentation  Taken 11/1/2023 2000 by Ashley Marinelli RN  VTE Prevention/Management: patient refused intervention  Range of Motion: active ROM (range of motion) encouraged  Intervention: Prevent Infection  Recent Flowsheet Documentation  Taken 11/1/2023 2000 by Ashley Marinelli RN  Infection Prevention: single patient room provided  Goal: Optimal Comfort and Wellbeing  Outcome: Ongoing, Progressing  Intervention: Provide Person-Centered Care  Recent Flowsheet Documentation  Taken 11/1/2023 2000 by Ashley Marinelli RN  Trust Relationship/Rapport:    choices provided   care explained  Goal: Readiness for Transition of Care  Outcome: Ongoing, Progressing     Problem: Fall Injury Risk  Goal: Absence of Fall and Fall-Related Injury  Outcome: Ongoing, Progressing  Intervention: Identify and Manage Contributors  Recent Flowsheet Documentation  Taken 11/2/2023 0200 by Ashley Marinelli RN  Medication Review/Management: medications reviewed  Taken 11/2/2023 0000 by Ashley Marinelli RN  Medication Review/Management: medications reviewed  Taken 11/1/2023 2200 by Ashley Marinelli RN  Medication Review/Management: medications reviewed  Taken 11/1/2023 2000 by Ashley Marinelli RN  Medication Review/Management: medications reviewed  Intervention: Promote Injury-Free Environment  Recent Flowsheet Documentation  Taken 11/2/2023 0200 by Ashley Marinelli RN  Safety Promotion/Fall Prevention: safety round/check completed  Taken 11/2/2023 0000 by Ashley Marinelli RN  Safety Promotion/Fall Prevention: safety round/check completed  Taken 11/1/2023 2200 by Ashley Marinelli RN  Safety Promotion/Fall Prevention: safety round/check completed  Taken 11/1/2023 2000 by Ashley Marinelil RN  Safety Promotion/Fall Prevention: safety round/check completed     Problem: Alcohol Withdrawal  Goal: Alcohol Withdrawal Symptom Control  Outcome: Ongoing, Progressing  Intervention: Minimize or Manage Alcohol Withdrawal Symptoms  Recent Flowsheet Documentation  Taken 11/1/2023 2000 by Ashley Marinelli RN  Sensory Stimulation Regulation: care clustered     Problem: Acute Neurologic Deterioration (Alcohol Withdrawal)  Goal: Optimal Neurologic Function  Outcome: Ongoing, Progressing  Intervention: Minimize or Manage Acute Neurologic Symptoms  Recent Flowsheet Documentation  Taken 11/1/2023 2000 by Ashley Marinelli RN  Sensory Stimulation Regulation: care clustered  Cerebral Perfusion Promotion: blood pressure monitored     Problem: Substance Misuse (Alcohol Withdrawal)  Goal: Readiness for Change Identified  Outcome:  Ongoing, Progressing  Intervention: Partner to Facilitate Behavior Change  Recent Flowsheet Documentation  Taken 11/1/2023 2000 by Ashley Marinelli RN  Supportive Measures: active listening utilized  Intervention: Promote Psychosocial Wellbeing  Recent Flowsheet Documentation  Taken 11/1/2023 2000 by Ashley Marinelli RN  Family/Support System Care: self-care encouraged     Problem: Behavioral Health Comorbidity  Goal: Maintenance of Behavioral Health Symptom Control  Outcome: Ongoing, Progressing  Intervention: Maintain Behavioral Health Symptom Control  Recent Flowsheet Documentation  Taken 11/2/2023 0200 by Ashley Marinelli RN  Medication Review/Management: medications reviewed  Taken 11/2/2023 0000 by Ashlye Marinelli RN  Medication Review/Management: medications reviewed  Taken 11/1/2023 2200 by Ashley Marinelli RN  Medication Review/Management: medications reviewed  Taken 11/1/2023 2000 by Ashley Marinelli RN  Medication Review/Management: medications reviewed     Problem: Hypertension Comorbidity  Goal: Blood Pressure in Desired Range  Outcome: Ongoing, Progressing  Intervention: Maintain Blood Pressure Management  Recent Flowsheet Documentation  Taken 11/2/2023 0200 by Ashley Marinelli RN  Medication Review/Management: medications reviewed  Taken 11/2/2023 0000 by Ashley Marinelli RN  Medication Review/Management: medications reviewed  Taken 11/1/2023 2200 by Ashley Marinelli RN  Medication Review/Management: medications reviewed  Taken 11/1/2023 2000 by Ashley Marinelli RN  Medication Review/Management: medications reviewed     Problem: VTE (Venous Thromboembolism)  Goal: VTE (Venous Thromboembolism) Symptom Resolution  Outcome: Ongoing, Progressing  Intervention: Prevent or Manage VTE (Venous Thromboembolism)  Recent Flowsheet Documentation  Taken 11/1/2023 2000 by Ashley Marinelli RN  VTE Prevention/Management: patient refused intervention     Problem: Activity and Energy Impairment (Depressive Signs/Symptoms)  Goal:  Optimized Energy Level (Depressive Signs/Symptoms)  Outcome: Ongoing, Progressing     Problem: Cognitive Impairment (Depressive Signs/Symptoms)  Goal: Optimized Cognitive Function  Outcome: Ongoing, Progressing     Problem: Decreased Participation/Engagement (Depressive Signs/Symptoms)  Goal: Increased Participation and Engagement (Depressive Signs/Symptoms)  Outcome: Ongoing, Progressing  Intervention: Facilitate Participation and Engagement  Recent Flowsheet Documentation  Taken 11/1/2023 2000 by Ashley Marinelli RN  Supportive Measures: active listening utilized     Problem: Feelings of Worthlessness, Hopelessness or Excessive Guilt (Depressive Signs/Symptoms)  Goal: Enhanced Self-Esteem and Confidence (Depressive Signs/Symptoms)  Outcome: Ongoing, Progressing     Problem: Mood Impairment (Depressive Signs/Symptoms)  Goal: Improved Mood Symptoms (Depressive Signs/Symptoms)  Outcome: Ongoing, Progressing  Intervention: Promote Mood Improvement  Recent Flowsheet Documentation  Taken 11/1/2023 2000 by Ashley Marinelli RN  Supportive Measures: active listening utilized     Problem: Nutrition Imbalance (Depressive Signs/Symptoms)  Goal: Optimized Nutrition Intake  Outcome: Ongoing, Progressing     Problem: Psychomotor Impairment (Depressive Signs/Symptoms)  Goal: Improved Psychomotor Symptoms (Depressive Signs/Symptoms)  Outcome: Ongoing, Progressing     Problem: Sleep Disturbance (Depressive Signs/Symptoms)  Goal: Improved Sleep (Depressive Signs/Symptoms)  Outcome: Ongoing, Progressing   Goal Outcome Evaluation:  Plan of Care Reviewed With: patient

## 2023-11-02 NOTE — PROGRESS NOTES
[facility]    Called pt to see what was decided regarding dc plan. He and son continue to work on dc plan- anticipated will need 24/7 after dc from rehab.     Lo HanRN  Acute Rehab Admission Nurse      1210 called pt to confirm dc plan. States son is going to ask his employer today about possibility of FMLA. Called son, with permission, to confirm. He will call me as soon as he confirms his ability to take FMLA.     Msg left to inform CCP

## 2023-11-03 LAB
ANION GAP SERPL CALCULATED.3IONS-SCNC: 9.4 MMOL/L (ref 5–15)
BASOPHILS # BLD AUTO: 0.03 10*3/MM3 (ref 0–0.2)
BASOPHILS NFR BLD AUTO: 0.4 % (ref 0–1.5)
BUN SERPL-MCNC: 14 MG/DL (ref 6–20)
BUN/CREAT SERPL: 15.1 (ref 7–25)
CALCIUM SPEC-SCNC: 9.3 MG/DL (ref 8.6–10.5)
CHLORIDE SERPL-SCNC: 109 MMOL/L (ref 98–107)
CO2 SERPL-SCNC: 24.6 MMOL/L (ref 22–29)
CREAT BLDA-MCNC: 1.3 MG/DL (ref 0.6–1.3)
CREAT SERPL-MCNC: 0.93 MG/DL (ref 0.76–1.27)
DEPRECATED RDW RBC AUTO: 44.7 FL (ref 37–54)
EGFRCR SERPLBLD CKD-EPI 2021: 96.4 ML/MIN/1.73
EOSINOPHIL # BLD AUTO: 0.4 10*3/MM3 (ref 0–0.4)
EOSINOPHIL NFR BLD AUTO: 5.6 % (ref 0.3–6.2)
ERYTHROCYTE [DISTWIDTH] IN BLOOD BY AUTOMATED COUNT: 14.7 % (ref 12.3–15.4)
GLUCOSE SERPL-MCNC: 100 MG/DL (ref 65–99)
HCT VFR BLD AUTO: 38.1 % (ref 37.5–51)
HGB BLD-MCNC: 12.7 G/DL (ref 13–17.7)
IMM GRANULOCYTES # BLD AUTO: 0.04 10*3/MM3 (ref 0–0.05)
IMM GRANULOCYTES NFR BLD AUTO: 0.6 % (ref 0–0.5)
LYMPHOCYTES # BLD AUTO: 1.39 10*3/MM3 (ref 0.7–3.1)
LYMPHOCYTES NFR BLD AUTO: 19.6 % (ref 19.6–45.3)
MCH RBC QN AUTO: 27.9 PG (ref 26.6–33)
MCHC RBC AUTO-ENTMCNC: 33.3 G/DL (ref 31.5–35.7)
MCV RBC AUTO: 83.7 FL (ref 79–97)
MONOCYTES # BLD AUTO: 0.64 10*3/MM3 (ref 0.1–0.9)
MONOCYTES NFR BLD AUTO: 9 % (ref 5–12)
NEUTROPHILS NFR BLD AUTO: 4.6 10*3/MM3 (ref 1.7–7)
NEUTROPHILS NFR BLD AUTO: 64.8 % (ref 42.7–76)
NRBC BLD AUTO-RTO: 0 /100 WBC (ref 0–0.2)
PLATELET # BLD AUTO: 231 10*3/MM3 (ref 140–450)
PMV BLD AUTO: 10 FL (ref 6–12)
POTASSIUM SERPL-SCNC: 3.7 MMOL/L (ref 3.5–5.2)
RBC # BLD AUTO: 4.55 10*6/MM3 (ref 4.14–5.8)
SODIUM SERPL-SCNC: 143 MMOL/L (ref 136–145)
WBC NRBC COR # BLD: 7.1 10*3/MM3 (ref 3.4–10.8)

## 2023-11-03 PROCEDURE — G0378 HOSPITAL OBSERVATION PER HR: HCPCS

## 2023-11-03 PROCEDURE — 97116 GAIT TRAINING THERAPY: CPT

## 2023-11-03 PROCEDURE — 96376 TX/PRO/DX INJ SAME DRUG ADON: CPT

## 2023-11-03 PROCEDURE — 85025 COMPLETE CBC W/AUTO DIFF WBC: CPT | Performed by: INTERNAL MEDICINE

## 2023-11-03 PROCEDURE — 80048 BASIC METABOLIC PNL TOTAL CA: CPT | Performed by: STUDENT IN AN ORGANIZED HEALTH CARE EDUCATION/TRAINING PROGRAM

## 2023-11-03 PROCEDURE — 25010000002 ONDANSETRON PER 1 MG: Performed by: INTERNAL MEDICINE

## 2023-11-03 RX ADMIN — HYDROXYZINE HYDROCHLORIDE 50 MG: 25 TABLET ORAL at 17:00

## 2023-11-03 RX ADMIN — Medication 100 MG: at 08:48

## 2023-11-03 RX ADMIN — SENNOSIDES AND DOCUSATE SODIUM 2 TABLET: 50; 8.6 TABLET ORAL at 08:46

## 2023-11-03 RX ADMIN — HYDRALAZINE HYDROCHLORIDE 100 MG: 50 TABLET ORAL at 14:29

## 2023-11-03 RX ADMIN — PANTOPRAZOLE SODIUM 40 MG: 40 TABLET, DELAYED RELEASE ORAL at 06:34

## 2023-11-03 RX ADMIN — AMLODIPINE BESYLATE 5 MG: 5 TABLET ORAL at 08:48

## 2023-11-03 RX ADMIN — MULTIPLE VITAMINS W/ MINERALS TAB 1 TABLET: TAB at 08:48

## 2023-11-03 RX ADMIN — HYDRALAZINE HYDROCHLORIDE 100 MG: 50 TABLET ORAL at 20:40

## 2023-11-03 RX ADMIN — LISINOPRIL 40 MG: 20 TABLET ORAL at 08:49

## 2023-11-03 RX ADMIN — HYDRALAZINE HYDROCHLORIDE 100 MG: 50 TABLET ORAL at 06:34

## 2023-11-03 RX ADMIN — Medication 10 MG: at 20:39

## 2023-11-03 RX ADMIN — CARVEDILOL 6.25 MG: 6.25 TABLET, FILM COATED ORAL at 17:00

## 2023-11-03 RX ADMIN — ONDANSETRON 4 MG: 2 INJECTION INTRAMUSCULAR; INTRAVENOUS at 21:48

## 2023-11-03 RX ADMIN — ATORVASTATIN CALCIUM 80 MG: 80 TABLET, FILM COATED ORAL at 20:39

## 2023-11-03 RX ADMIN — VILAZODONE HYDROCHLORIDE 40 MG: 40 TABLET, FILM COATED ORAL at 08:48

## 2023-11-03 RX ADMIN — FOLIC ACID 1 MG: 1 TABLET ORAL at 08:49

## 2023-11-03 RX ADMIN — Medication 10 ML: at 08:49

## 2023-11-03 RX ADMIN — ASPIRIN 81 MG: 81 TABLET, COATED ORAL at 08:47

## 2023-11-03 RX ADMIN — Medication 10 ML: at 20:39

## 2023-11-03 RX ADMIN — TICAGRELOR 60 MG: 60 TABLET ORAL at 20:39

## 2023-11-03 RX ADMIN — SENNOSIDES AND DOCUSATE SODIUM 2 TABLET: 50; 8.6 TABLET ORAL at 20:39

## 2023-11-03 RX ADMIN — HYDROXYZINE HYDROCHLORIDE 50 MG: 25 TABLET ORAL at 08:47

## 2023-11-03 RX ADMIN — TICAGRELOR 60 MG: 60 TABLET ORAL at 08:48

## 2023-11-03 RX ADMIN — CARVEDILOL 6.25 MG: 6.25 TABLET, FILM COATED ORAL at 08:47

## 2023-11-03 NOTE — PROGRESS NOTES
Continued Stay Note  [facility]     Patient Name: Flako Coreas  MRN: [id number]  Today's Date: 11/3/2023    Admit Date: 10/26/2023    Plan: [facility] acute rehab pending [facility] Medicaid pre-cert   Discharge Plan       Row Name 11/03/23 1125       Plan    Plan [facility] acute rehab pending [facility] Medicaid pre-cert    Patient/Family in Agreement with Plan yes    Plan Comments Per [facility] accepts pt pending [facility] Medicaid pre-cert, initiated today. Sophia Fields LCSW                   Discharge Codes    No documentation.                 Expected Discharge Date and Time       Expected Discharge Date Expected Discharge Time    Nov 3, 2023               Yamilet Fields LCSW

## 2023-11-03 NOTE — PLAN OF CARE
Problem: Adult Inpatient Plan of Care  Goal: Plan of Care Review  Outcome: Ongoing, Progressing  Flowsheets (Taken 11/3/2023 0254)  Plan of Care Reviewed With: patient  Goal: Patient-Specific Goal (Individualized)  Outcome: Ongoing, Progressing  Goal: Absence of Hospital-Acquired Illness or Injury  Outcome: Ongoing, Progressing  Intervention: Identify and Manage Fall Risk  Recent Flowsheet Documentation  Taken 11/2/2023 2358 by Ashley Marinelli RN  Safety Promotion/Fall Prevention: safety round/check completed  Taken 11/2/2023 2156 by Ashley Marinelli RN  Safety Promotion/Fall Prevention: safety round/check completed  Taken 11/2/2023 1924 by Ashley Marinelli RN  Safety Promotion/Fall Prevention: safety round/check completed  Intervention: Prevent Skin Injury  Recent Flowsheet Documentation  Taken 11/2/2023 2358 by Ashley Marinelli RN  Body Position: position changed independently  Taken 11/2/2023 2156 by Ashley Marinelli RN  Body Position: position changed independently  Taken 11/2/2023 1924 by Ashley Marinelli RN  Body Position: position changed independently  Skin Protection: adhesive use limited  Intervention: Prevent and Manage VTE (Venous Thromboembolism) Risk  Recent Flowsheet Documentation  Taken 11/2/2023 1924 by Ashley Marinelli RN  VTE Prevention/Management: patient refused intervention  Range of Motion: active ROM (range of motion) encouraged  Intervention: Prevent Infection  Recent Flowsheet Documentation  Taken 11/2/2023 1924 by Aslhey Marinelli RN  Infection Prevention: single patient room provided  Goal: Optimal Comfort and Wellbeing  Outcome: Ongoing, Progressing  Intervention: Provide Person-Centered Care  Recent Flowsheet Documentation  Taken 11/2/2023 1924 by Ashley Marinelli RN  Trust Relationship/Rapport:   care explained   choices provided  Goal: Readiness for Transition of Care  Outcome: Ongoing, Progressing     Problem: Fall Injury Risk  Goal: Absence of Fall and Fall-Related Injury  Outcome:  Ongoing, Progressing  Intervention: Identify and Manage Contributors  Recent Flowsheet Documentation  Taken 11/2/2023 2358 by Ashley Marinelli RN  Medication Review/Management: medications reviewed  Taken 11/2/2023 2156 by Ashley Marinelli RN  Medication Review/Management: medications reviewed  Taken 11/2/2023 1924 by Ashley Marinelli RN  Medication Review/Management: medications reviewed  Intervention: Promote Injury-Free Environment  Recent Flowsheet Documentation  Taken 11/2/2023 2358 by Ashley Marinelli RN  Safety Promotion/Fall Prevention: safety round/check completed  Taken 11/2/2023 2156 by Ashley Marinelli RN  Safety Promotion/Fall Prevention: safety round/check completed  Taken 11/2/2023 1924 by Ashley Marinelli RN  Safety Promotion/Fall Prevention: safety round/check completed     Problem: Alcohol Withdrawal  Goal: Alcohol Withdrawal Symptom Control  Outcome: Ongoing, Progressing  Intervention: Minimize or Manage Alcohol Withdrawal Symptoms  Recent Flowsheet Documentation  Taken 11/2/2023 1924 by Ashley Marinelli RN  Sensory Stimulation Regulation: care clustered     Problem: Acute Neurologic Deterioration (Alcohol Withdrawal)  Goal: Optimal Neurologic Function  Outcome: Ongoing, Progressing  Intervention: Minimize or Manage Acute Neurologic Symptoms  Recent Flowsheet Documentation  Taken 11/2/2023 1924 by Ashley Marinelli RN  Sensory Stimulation Regulation: care clustered  Cerebral Perfusion Promotion: blood pressure monitored     Problem: Substance Misuse (Alcohol Withdrawal)  Goal: Readiness for Change Identified  Outcome: Ongoing, Progressing  Intervention: Partner to Facilitate Behavior Change  Recent Flowsheet Documentation  Taken 11/2/2023 1924 by Ashley Marinelli RN  Supportive Measures: active listening utilized  Intervention: Promote Psychosocial Wellbeing  Recent Flowsheet Documentation  Taken 11/2/2023 1924 by Ashley Marinelli RN  Family/Support System Care: self-care encouraged     Problem: Behavioral  Health Comorbidity  Goal: Maintenance of Behavioral Health Symptom Control  Outcome: Ongoing, Progressing  Intervention: Maintain Behavioral Health Symptom Control  Recent Flowsheet Documentation  Taken 11/2/2023 2358 by Ashley Marinelli RN  Medication Review/Management: medications reviewed  Taken 11/2/2023 2156 by Ashley Marinelli RN  Medication Review/Management: medications reviewed  Taken 11/2/2023 1924 by Ashley Marinelli RN  Medication Review/Management: medications reviewed     Problem: Hypertension Comorbidity  Goal: Blood Pressure in Desired Range  Outcome: Ongoing, Progressing  Intervention: Maintain Blood Pressure Management  Recent Flowsheet Documentation  Taken 11/2/2023 2358 by Ashley Marinelli RN  Medication Review/Management: medications reviewed  Taken 11/2/2023 2156 by Ashley Marinelli RN  Medication Review/Management: medications reviewed  Taken 11/2/2023 1924 by Ashley Marinelli RN  Medication Review/Management: medications reviewed     Problem: VTE (Venous Thromboembolism)  Goal: VTE (Venous Thromboembolism) Symptom Resolution  Outcome: Ongoing, Progressing  Intervention: Prevent or Manage VTE (Venous Thromboembolism)  Recent Flowsheet Documentation  Taken 11/2/2023 1924 by Ashley Marinelli RN  VTE Prevention/Management: patient refused intervention     Problem: Activity and Energy Impairment (Depressive Signs/Symptoms)  Goal: Optimized Energy Level (Depressive Signs/Symptoms)  Outcome: Ongoing, Progressing     Problem: Cognitive Impairment (Depressive Signs/Symptoms)  Goal: Optimized Cognitive Function  Outcome: Ongoing, Progressing     Problem: Decreased Participation/Engagement (Depressive Signs/Symptoms)  Goal: Increased Participation and Engagement (Depressive Signs/Symptoms)  Outcome: Ongoing, Progressing  Intervention: Facilitate Participation and Engagement  Recent Flowsheet Documentation  Taken 11/2/2023 1924 by Ashley Marinelli RN  Supportive Measures: active listening utilized     Problem:  Feelings of Worthlessness, Hopelessness or Excessive Guilt (Depressive Signs/Symptoms)  Goal: Enhanced Self-Esteem and Confidence (Depressive Signs/Symptoms)  Outcome: Ongoing, Progressing     Problem: Mood Impairment (Depressive Signs/Symptoms)  Goal: Improved Mood Symptoms (Depressive Signs/Symptoms)  Outcome: Ongoing, Progressing  Intervention: Promote Mood Improvement  Recent Flowsheet Documentation  Taken 11/2/2023 1924 by Ashley Marinelli RN  Supportive Measures: active listening utilized     Problem: Nutrition Imbalance (Depressive Signs/Symptoms)  Goal: Optimized Nutrition Intake  Outcome: Ongoing, Progressing     Problem: Psychomotor Impairment (Depressive Signs/Symptoms)  Goal: Improved Psychomotor Symptoms (Depressive Signs/Symptoms)  Outcome: Ongoing, Progressing     Problem: Sleep Disturbance (Depressive Signs/Symptoms)  Goal: Improved Sleep (Depressive Signs/Symptoms)  Outcome: Ongoing, Progressing   Goal Outcome Evaluation:  Plan of Care Reviewed With: patient

## 2023-11-03 NOTE — PROGRESS NOTES
Name: Flako Coreas ADMIT: 10/26/2023   : 1965  PCP: Akash Rodriguez Jr.,     MRN: [id number] LOS: 0 days   AGE/SEX: 58 y.o. male  ROOM: [facility]     Subjective   Subjective   No new issues. Hopeful to be able to go to acute rehab at discharge       Objective   Objective   Vital Signs  Temp:  [97.7 °F (36.5 °C)-98.8 °F (37.1 °C)] 97.7 °F (36.5 °C)  Heart Rate:  [60-72] 60  Resp:  [16-18] 18  BP: (127-169)/(79-95) 136/85  SpO2:  [95 %-99 %] 99 %  on   ;   Device (Oxygen Therapy): room air  Body mass index is 29.66 kg/m².  Physical Exam  Vitals and nursing note reviewed.   Constitutional:       General: He is not in acute distress.  HENT:      Head: Normocephalic.      Mouth/Throat:      Mouth: Mucous membranes are moist.   Eyes:      Conjunctiva/sclera: Conjunctivae normal.   Cardiovascular:      Rate and Rhythm: Normal rate and regular rhythm.   Pulmonary:      Effort: Pulmonary effort is normal. No respiratory distress.      Breath sounds: No wheezing or rales.   Abdominal:      General: Bowel sounds are normal.      Palpations: Abdomen is soft.   Musculoskeletal:      Cervical back: Neck supple.      Right lower leg: No edema.      Left lower leg: No edema.   Skin:     General: Skin is warm and dry.   Neurological:      Mental Status: He is alert and oriented to person, place, and time.   Psychiatric:         Mood and Affect: Mood normal.         Behavior: Behavior normal.       Results Review     I reviewed the patient's new clinical results.  Results from last 7 days   Lab Units 23  0643 23  0710 23  0450 10/31/23  2019 10/31/23  1220 10/31/23  0538   WBC 10*3/mm3 7.10 7.19 7.56  --   --  6.10   HEMOGLOBIN g/dL 12.7* 13.0 12.9*  12.9* 12.5*   < > 12.2*   PLATELETS 10*3/mm3 231 258 247  --   --  205    < > = values in this interval not displayed.     Results from last 7 days   Lab Units 23  0643 23  0710 23  0450 10/31/23  0538   SODIUM mmol/L 143 143 140  "140   POTASSIUM mmol/L 3.7 4.1 3.5 3.6   CHLORIDE mmol/L 109* 107 108* 110*   CO2 mmol/L 24.6 25.9 22.1 22.5   BUN mg/dL 14 15 14 13   CREATININE mg/dL 0.93 1.13 1.00 0.94   GLUCOSE mg/dL 100* 95 98 94   EGFR mL/min/1.73 96.4 76.3 88.3 95.1       Results from last 7 days   Lab Units 11/03/23  0643 11/02/23  0710 11/01/23  0450 10/31/23  0538   CALCIUM mg/dL 9.3 9.1 8.8 8.9       No results found for: \"HGBA1C\", \"POCGLU\"    No radiology results for the last day    I have personally reviewed all medications:  Scheduled Medications  amLODIPine, 5 mg, Oral, Daily  aspirin, 81 mg, Oral, Daily  atorvastatin, 80 mg, Oral, Nightly  carvedilol, 6.25 mg, Oral, BID With Meals  folic acid, 1 mg, Oral, Daily  hydrALAZINE, 100 mg, Oral, Q8H  lidocaine 1% - EPINEPHrine 1:[id number], 10 mL, Injection, Once  lisinopril, 40 mg, Oral, Daily  melatonin, 10 mg, Oral, Nightly  multivitamin with minerals, 1 tablet, Oral, Daily  pantoprazole, 40 mg, Oral, Q AM  senna-docusate sodium, 2 tablet, Oral, BID  sodium chloride, 10 mL, Intravenous, Q12H  thiamine, 100 mg, Oral, Daily  ticagrelor, 60 mg, Oral, BID  vilazodone, 40 mg, Oral, Daily    Infusions   Diet  Diet: Cardiac Diets; Healthy Heart (2-3 Na+); Texture: Regular Texture (IDDSI 7); Fluid Consistency: Thin (IDDSI 0)    I have personally reviewed:  [x]  Laboratory   [x]  Microbiology   [x]  Radiology   [x]  EKG/Telemetry  [x]  Cardiology/Vascular   []  Pathology    []  Records       Assessment/Plan     Active Hospital Problems    Diagnosis  POA    **Vertigo [R42]  Yes    Alcohol abuse [F10.10]  Yes    Concussion [S06.0XAA]  Yes    Fall [W19.XXXA]  Yes    Head injury, closed, with concussion [S06.0XAA]  Yes    HTN (hypertension) [I10]  Yes    CVA (cerebral vascular accident) [I63.9]  Yes    Mixed hyperlipidemia [E78.2]  Yes      Resolved Hospital Problems    Diagnosis Date Resolved POA    TIA (transient ischemic attack) [G45.9] 10/27/2023 Yes       58 y.o. male admitted with " Vertigo.    1.Head injury/concussion secondary to vertigo from left cerebellar PICA severe stenosis, neurology did evaluate and MRI during this admission did not reveal any new acute infarcts or subacute infarcts.  Continue with aspirin, Brilinta and statins. [facility] has accepted; precert initiated per CCP     2.  Subacute left cerebellar CVA diagnosed on 10/23/2023, treatment as mentioned above.     3.  Rectal bleeding, GI did evaluate and offered further work-up however patient opted to postpone work-up until he can come off Brilinta which is 90 days from start date.  Follow-up with GI as an outpatient basis. Hgb stable 12.7     4.  Hypertension, on amlodipine, Coreg, hydralazine. BP acceptable acutely     5.  Ethanol abuse, has been monitored for withdrawal and was on CIWA protocol, now stopped.  Continue with folate and thiamine supplements.  He was counseled extensively to quit drinking alcohol         SCDs for DVT prophylaxis.  Full code.  Discussed with patient.  Anticipate discharge to [facility] once precert has been obtained..      JANEEN Fernández  [facility]  11/03/23  11:46 EDT

## 2023-11-03 NOTE — THERAPY TREATMENT NOTE
Patient Name: Flako Coreas  : 1965    MRN: [id number]                              Today's Date: 11/3/2023       Admit Date: 10/26/2023    Visit Dx:     ICD-10-CM ICD-9-CM   1. Vertigo  R42 780.4   2. Closed head injury, initial encounter  S09.90XA 959.01   3. Laceration of forehead, initial encounter  S01.81XA 873.42   4. Elevated serum creatinine  R79.89 790.99   5. 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    Acute CVA (cerebrovascular accident)    HTN (hypertension)    Late effect of cerebrovascular accident (CVA)    Concussion    Fall    Head injury, closed, with concussion    Alcohol abuse     Past Medical History:   Diagnosis Date    ADHD (attention deficit hyperactivity disorder)     On going issue    Anxiety     Lepe's esophagus     Benign prostatic hyperplasia Surgery in 2021    Clotting disorder Intestinal    Depression     Diverticulitis     Diverticulosis     Duodenitis     Enteritis     Failure to thrive (child)     Family history of blood clots     GERD (gastroesophageal reflux disease)     Hyperlipidemia     Hypertension     Hypertensive emergency     Low testosterone     Microcytosis     Pancreatitis     Pneumonia     PONV (postoperative nausea and vomiting)     Seasonal affective disorder     Shoulder injury     Stroke     Unexplained weight loss      Past Surgical History:   Procedure Laterality Date    COLON SURGERY      COLONOSCOPY      COLONOSCOPY N/A 2022    Procedure: COLONOSCOPY INTO CECUM WITH HOT SNARE POLYPECTOMY;  Surgeon: Albert Gallo  MD LIBBY;  Location: [facility] ENDOSCOPY;  Service: Gastroenterology;  Laterality: N/A;  PRE- GI BLEED  POST- DIVERTICULOSIS, POLYP    ENDOSCOPY N/A 12/10/2022    Procedure: ESOPHAGOGASTRODUODENOSCOPY;  Surgeon: Albert Gallo MD;  Location: [facility] ENDOSCOPY;  Service: Gastroenterology;  Laterality: N/A;  PRE- DARK STOOLS  POST- BARRETTS ESOPHAGUS    FRACTURE SURGERY  1980    for broken arm    FRACTURE SURGERY      for broken hand    INCISION AND DRAINAGE ABSCESS  2015    anal    PROSTATE SURGERY      SMALL INTESTINE SURGERY      TONSILLECTOMY        General Information       Row Name 11/03/23 1557          Physical Therapy Time and Intention    Document Type therapy note (daily note)  -     Mode of Treatment individual therapy;physical therapy  -       Row Name 11/03/23 1557          General Information    Patient Profile Reviewed yes  -     Existing Precautions/Restrictions fall  -       Row Name 11/03/23 1557          Cognition    Orientation Status (Cognition) oriented x 4  -       Row Name 11/03/23 1557          Safety Issues, Functional Mobility    Impairments Affecting Function (Mobility) balance;endurance/activity tolerance;strength;coordination  -               User Key  (r) = Recorded By, (t) = Taken By, (c) = Cosigned By      Initials Name Provider Type     Michelle Fraser PTA Physical Therapist Assistant                   Mobility       Row Name 11/03/23 1557          Bed Mobility    All Activities, [place] (Bed Mobility) standby assist  -     Assistive Device (Bed Mobility) bed rails;head of bed elevated  -       Row Name 11/03/23 1557          Sit-Stand Transfer    Sit-Stand [place] (Transfers) contact guard  -     Assistive Device (Sit-Stand Transfers) walker, front-wheeled  -       Row Name 11/03/23 1557          Gait/Stairs (Locomotion)    [place] Level (Gait) minimum assist (75% patient effort)  -     Assistive Device (Gait) walker, front-wheeled  -     Distance  in Feet (Gait) 2X50ft  -EB     Deviations/Abnormal Patterns (Gait) gait speed decreased;stride length decreased  -EB     Bilateral Gait Deviations forward flexed posture  -EB     Left Sided Gait Deviations foot drop/toe drag;heel strike decreased  -               User Key  (r) = Recorded By, (t) = Taken By, (c) = Cosigned By      Initials Name Provider Type    Michelle Feliz PTA Physical Therapist Assistant                   Obj/Interventions    No documentation.                  Goals/Plan    No documentation.                  Clinical Impression       Row Name 11/03/23 1554          Plan of Care Review    Plan of Care Reviewed With patient  -EB     Progress improving  -     Outcome Evaluation Pt seen for PT treatment today. Pt continues to progress with mobility by improving gait but still requires CGA/Evans due to some unsteadiness. Pt c/o weakness of the bilateral LEs and needs walker for balance. Pt is improving on coordination of the left LE and foot placement. Anticipate d/c to BAR at d/c. Will continue to progress pt as able.  -       Row Name 11/03/23 1554          Therapy Assessment/Plan (PT)    Therapy Frequency (PT) 6 times/wk  -       Row Name 11/03/23 1554          Positioning and Restraints    Pre-Treatment Position in bed  -EB     Post Treatment Position bed  -EB     In Bed supine;call light within reach;encouraged to call for assist;exit alarm on  -EB               User Key  (r) = Recorded By, (t) = Taken By, (c) = Cosigned By      Initials Name Provider Type    Michelle Feliz PTA Physical Therapist Assistant                   Outcome Measures       Row Name 11/03/23 1557 11/03/23 0750       How much help from another person do you currently need...    Turning from your back to your side while in flat bed without using bedrails? 3  -EB 4  -MM    Moving from lying on back to sitting on the side of a flat bed without bedrails? 3  -EB 3  -MM    Moving to and from a bed to a chair  (including a wheelchair)? 3  -EB 3  -MM    Standing up from a chair using your arms (e.g., wheelchair, bedside chair)? 3  -EB 3  -MM    Climbing 3-5 steps with a railing? 2  -EB 2  -MM    To walk in hospital room? 3  -EB 3  -MM    AM-PAC 6 Clicks Score (PT) 17  -EB 18  -MM    Highest level of mobility 5 --> Static standing  -EB 6 --> Walked 10 steps or more  -MM      Row Name 11/03/23 1557          Modified [place] Scale    Modified [place] Scale 4 - Moderately severe disability.  Unable to walk without assistance, and unable to attend to own bodily needs without assistance.  -EB               User Key  (r) = Recorded By, (t) = Taken By, (c) = Cosigned By      Initials Name Provider Type    Michelle Feliz PTA Physical Therapist Assistant    Ying Back, RN Registered Nurse                                 Physical Therapy Education       Title: PT OT SLP Therapies (Done)       Topic: Physical Therapy (Done)       Point: Mobility training (Done)       Learning Progress Summary             Patient Acceptance, E,D, VU,DU,NR by EB at 11/3/2023 1557    Acceptance, E, VU,NR by EB at 11/2/2023 1608    Acceptance, E,D, VU,NR by EB at 11/2/2023 1604    Acceptance, E,D, VU,NR by EB at 11/1/2023 1015    Acceptance, E,D, VU,NR by EB at 10/31/2023 0953    Acceptance, E, VU by DB at 10/30/2023 0907    Acceptance, E,D, VU,NR by LW at 10/28/2023 1558    Acceptance, E, VU,NR by EM at 10/27/2023 1627                         Point: Home exercise program (Done)       Learning Progress Summary             Patient Acceptance, E,D, VU,DU,NR by EB at 11/3/2023 1557    Acceptance, E,D, VU,NR by EB at 11/1/2023 1015    Acceptance, E,D, VU,NR by EB at 10/31/2023 0953    Acceptance, E, VU by DB at 10/30/2023 0907                         Point: Body mechanics (Done)       Learning Progress Summary             Patient Acceptance, E,D, VU,DU,NR by EB at 11/3/2023 1557    Acceptance, E, VU,NR by EB at 11/2/2023 1608    Acceptance, E,D,  VU,NR by EB at 11/2/2023 1604    Acceptance, E,D, VU,NR by EB at 11/1/2023 1015    Acceptance, E,D, VU,NR by EB at 10/31/2023 0953    Acceptance, E, VU by DB at 10/30/2023 0907                         Point: Precautions (Done)       Learning Progress Summary             Patient Acceptance, E,D, VU,DU,NR by EB at 11/3/2023 1557    Acceptance, E, VU,NR by EB at 11/2/2023 1608    Acceptance, E,D, VU,NR by EB at 11/2/2023 1604    Acceptance, E,D, VU,NR by EB at 11/1/2023 1015    Acceptance, E,D, VU,NR by EB at 10/31/2023 0953    Acceptance, E, VU by DB at 10/30/2023 0907                                         User Key       Initials Effective Dates Name Provider Type Discipline    EM 06/16/21 -  Viviane Rivas, PT Physical Therapist PT    EB 02/14/23 -  Michelle Fraser PTA Physical Therapist Assistant PT    DB 06/16/21 -  Marianne Mccullough, PT Physical Therapist PT    LW 05/08/23 -  Margo Mario, PT Physical Therapist PT                  PT Recommendation and Plan     Plan of Care Reviewed With: patient  Progress: improving  Outcome Evaluation: Pt seen for PT treatment today. Pt continues to progress with mobility by improving gait but still requires CGA/Evans due to some unsteadiness. Pt c/o weakness of the bilateral LEs and needs walker for balance. Pt is improving on coordination of the left LE and foot placement. Anticipate d/c to BAR at d/c. Will continue to progress pt as able.     Time Calculation:         PT Charges       Row Name 11/03/23 1554             Time Calculation    Start Time 1527  -EB      Stop Time 1540  -EB      Time Calculation (min) 13 min  -EB      PT Received On 11/03/23  -EB      PT - Next Appointment 11/04/23  -         Time Calculation- PT    Total Timed Code Minutes- PT 13 minute(s)  -EB                User Key  (r) = Recorded By, (t) = Taken By, (c) = Cosigned By      Initials Name Provider Type    EB Michelle Fraser, ALESHIA Physical Therapist Assistant                  Therapy Charges  for Today       Code Description Service Date Service Provider Modifiers Qty    84097213314 HC GAIT TRAINING EA 15 MIN 11/2/2023 Michelle Fraser, ALESHIA GP 1    24933809398 HC GAIT TRAINING EA 15 MIN 11/3/2023 Michelle Fraser, ALESHIA GP 1            PT G-Codes  Outcome Measure Options: AM-PAC 6 Clicks Daily Activity (OT)  AM-PAC 6 Clicks Score (PT): 17  AM-PAC 6 Clicks Score (OT): 19  Modified Vivian Scale: 4 - Moderately severe disability.  Unable to walk without assistance, and unable to attend to own bodily needs without assistance.       Michelle Fraser PTA  11/3/2023

## 2023-11-03 NOTE — PLAN OF CARE
Goal Outcome Evaluation:  Plan of Care Reviewed With: patient        Progress: improving  Outcome Evaluation: Pt seen for PT treatment today. Pt continues to progress with mobility by improving gait but still requires CGA/Evans due to some unsteadiness. Pt c/o weakness of the bilateral LEs and needs walker for balance. Pt is improving on coordination of the left LE and foot placement. Anticipate d/c to BAR at d/c. Will continue to progress pt as able.

## 2023-11-04 LAB
ANION GAP SERPL CALCULATED.3IONS-SCNC: 11.4 MMOL/L (ref 5–15)
BASOPHILS # BLD AUTO: 0.06 10*3/MM3 (ref 0–0.2)
BASOPHILS NFR BLD AUTO: 0.8 % (ref 0–1.5)
BUN SERPL-MCNC: 13 MG/DL (ref 6–20)
BUN/CREAT SERPL: 13.7 (ref 7–25)
CALCIUM SPEC-SCNC: 9 MG/DL (ref 8.6–10.5)
CHLORIDE SERPL-SCNC: 109 MMOL/L (ref 98–107)
CO2 SERPL-SCNC: 24.6 MMOL/L (ref 22–29)
CREAT SERPL-MCNC: 0.95 MG/DL (ref 0.76–1.27)
DEPRECATED RDW RBC AUTO: 45 FL (ref 37–54)
EGFRCR SERPLBLD CKD-EPI 2021: 93.9 ML/MIN/1.73
EOSINOPHIL # BLD AUTO: 0.57 10*3/MM3 (ref 0–0.4)
EOSINOPHIL NFR BLD AUTO: 7.8 % (ref 0.3–6.2)
ERYTHROCYTE [DISTWIDTH] IN BLOOD BY AUTOMATED COUNT: 14.7 % (ref 12.3–15.4)
GLUCOSE SERPL-MCNC: 99 MG/DL (ref 65–99)
HCT VFR BLD AUTO: 36.9 % (ref 37.5–51)
HGB BLD-MCNC: 12.1 G/DL (ref 13–17.7)
IMM GRANULOCYTES # BLD AUTO: 0.02 10*3/MM3 (ref 0–0.05)
IMM GRANULOCYTES NFR BLD AUTO: 0.3 % (ref 0–0.5)
LYMPHOCYTES # BLD AUTO: 1.52 10*3/MM3 (ref 0.7–3.1)
LYMPHOCYTES NFR BLD AUTO: 20.8 % (ref 19.6–45.3)
MAGNESIUM SERPL-MCNC: 1.8 MG/DL (ref 1.6–2.6)
MCH RBC QN AUTO: 27.6 PG (ref 26.6–33)
MCHC RBC AUTO-ENTMCNC: 32.8 G/DL (ref 31.5–35.7)
MCV RBC AUTO: 84.2 FL (ref 79–97)
MONOCYTES # BLD AUTO: 0.59 10*3/MM3 (ref 0.1–0.9)
MONOCYTES NFR BLD AUTO: 8.1 % (ref 5–12)
NEUTROPHILS NFR BLD AUTO: 4.56 10*3/MM3 (ref 1.7–7)
NEUTROPHILS NFR BLD AUTO: 62.2 % (ref 42.7–76)
NRBC BLD AUTO-RTO: 0 /100 WBC (ref 0–0.2)
PLATELET # BLD AUTO: 242 10*3/MM3 (ref 140–450)
PMV BLD AUTO: 9.9 FL (ref 6–12)
POTASSIUM SERPL-SCNC: 3.6 MMOL/L (ref 3.5–5.2)
QT INTERVAL: 575 MS
QTC INTERVAL: 589 MS
RBC # BLD AUTO: 4.38 10*6/MM3 (ref 4.14–5.8)
SODIUM SERPL-SCNC: 145 MMOL/L (ref 136–145)
WBC NRBC COR # BLD: 7.32 10*3/MM3 (ref 3.4–10.8)

## 2023-11-04 PROCEDURE — 85025 COMPLETE CBC W/AUTO DIFF WBC: CPT | Performed by: INTERNAL MEDICINE

## 2023-11-04 PROCEDURE — G0378 HOSPITAL OBSERVATION PER HR: HCPCS

## 2023-11-04 PROCEDURE — 83735 ASSAY OF MAGNESIUM: CPT | Performed by: NURSE PRACTITIONER

## 2023-11-04 PROCEDURE — 93005 ELECTROCARDIOGRAM TRACING: CPT | Performed by: STUDENT IN AN ORGANIZED HEALTH CARE EDUCATION/TRAINING PROGRAM

## 2023-11-04 PROCEDURE — 93010 ELECTROCARDIOGRAM REPORT: CPT | Performed by: INTERNAL MEDICINE

## 2023-11-04 PROCEDURE — 97116 GAIT TRAINING THERAPY: CPT

## 2023-11-04 PROCEDURE — 80048 BASIC METABOLIC PNL TOTAL CA: CPT | Performed by: STUDENT IN AN ORGANIZED HEALTH CARE EDUCATION/TRAINING PROGRAM

## 2023-11-04 RX ADMIN — HYDRALAZINE HYDROCHLORIDE 100 MG: 50 TABLET ORAL at 05:40

## 2023-11-04 RX ADMIN — Medication 10 ML: at 08:28

## 2023-11-04 RX ADMIN — TICAGRELOR 60 MG: 60 TABLET ORAL at 08:28

## 2023-11-04 RX ADMIN — LISINOPRIL 40 MG: 20 TABLET ORAL at 08:24

## 2023-11-04 RX ADMIN — CARVEDILOL 6.25 MG: 6.25 TABLET, FILM COATED ORAL at 18:24

## 2023-11-04 RX ADMIN — HYDROXYZINE HYDROCHLORIDE 50 MG: 25 TABLET ORAL at 13:14

## 2023-11-04 RX ADMIN — HYDROXYZINE HYDROCHLORIDE 50 MG: 25 TABLET ORAL at 20:13

## 2023-11-04 RX ADMIN — Medication 10 MG: at 20:11

## 2023-11-04 RX ADMIN — HYDRALAZINE HYDROCHLORIDE 100 MG: 50 TABLET ORAL at 20:12

## 2023-11-04 RX ADMIN — FOLIC ACID 1 MG: 1 TABLET ORAL at 08:24

## 2023-11-04 RX ADMIN — CARVEDILOL 6.25 MG: 6.25 TABLET, FILM COATED ORAL at 08:24

## 2023-11-04 RX ADMIN — TICAGRELOR 60 MG: 60 TABLET ORAL at 20:11

## 2023-11-04 RX ADMIN — HYDROXYZINE HYDROCHLORIDE 50 MG: 25 TABLET ORAL at 08:28

## 2023-11-04 RX ADMIN — VILAZODONE HYDROCHLORIDE 40 MG: 40 TABLET, FILM COATED ORAL at 08:24

## 2023-11-04 RX ADMIN — PANTOPRAZOLE SODIUM 40 MG: 40 TABLET, DELAYED RELEASE ORAL at 05:40

## 2023-11-04 RX ADMIN — AMLODIPINE BESYLATE 5 MG: 5 TABLET ORAL at 08:24

## 2023-11-04 RX ADMIN — ATORVASTATIN CALCIUM 80 MG: 80 TABLET, FILM COATED ORAL at 20:11

## 2023-11-04 RX ADMIN — MULTIPLE VITAMINS W/ MINERALS TAB 1 TABLET: TAB at 08:24

## 2023-11-04 RX ADMIN — SENNOSIDES AND DOCUSATE SODIUM 2 TABLET: 50; 8.6 TABLET ORAL at 20:11

## 2023-11-04 RX ADMIN — Medication 100 MG: at 08:24

## 2023-11-04 RX ADMIN — Medication 10 ML: at 20:10

## 2023-11-04 RX ADMIN — SENNOSIDES AND DOCUSATE SODIUM 2 TABLET: 50; 8.6 TABLET ORAL at 08:28

## 2023-11-04 RX ADMIN — ASPIRIN 81 MG: 81 TABLET, COATED ORAL at 08:28

## 2023-11-04 NOTE — THERAPY TREATMENT NOTE
Patient Name: Flako Coreas  : 1965    MRN: [id number]                              Today's Date: 2023       Admit Date: 10/26/2023    Visit Dx:     ICD-10-CM ICD-9-CM   1. Vertigo  R42 780.4   2. Closed head injury, initial encounter  S09.90XA 959.01   3. Laceration of forehead, initial encounter  S01.81XA 873.42   4. Elevated serum creatinine  R79.89 790.99   5. 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    Acute CVA (cerebrovascular accident)    HTN (hypertension)    Late effect of cerebrovascular accident (CVA)    Concussion    Fall    Head injury, closed, with concussion    Alcohol abuse     Past Medical History:   Diagnosis Date    ADHD (attention deficit hyperactivity disorder)     On going issue    Anxiety     Lepe's esophagus     Benign prostatic hyperplasia Surgery in 2021    Clotting disorder Intestinal    Depression     Diverticulitis     Diverticulosis     Duodenitis     Enteritis     Failure to thrive (child)     Family history of blood clots     GERD (gastroesophageal reflux disease)     Hyperlipidemia     Hypertension     Hypertensive emergency     Low testosterone     Microcytosis     Pancreatitis     Pneumonia     PONV (postoperative nausea and vomiting)     Seasonal affective disorder     Shoulder injury     Stroke     Unexplained weight loss      Past Surgical History:   Procedure Laterality Date    COLON SURGERY      COLONOSCOPY      COLONOSCOPY N/A 2022    Procedure: COLONOSCOPY INTO CECUM WITH HOT SNARE POLYPECTOMY;  Surgeon: Albert Gallo  MD LIBBY;  Location: [facility] ENDOSCOPY;  Service: Gastroenterology;  Laterality: N/A;  PRE- GI BLEED  POST- DIVERTICULOSIS, POLYP    ENDOSCOPY N/A 12/10/2022    Procedure: ESOPHAGOGASTRODUODENOSCOPY;  Surgeon: Albert Gallo MD;  Location: [facility] ENDOSCOPY;  Service: Gastroenterology;  Laterality: N/A;  PRE- DARK STOOLS  POST- BARRETTS ESOPHAGUS    FRACTURE SURGERY  1980    for broken arm    FRACTURE SURGERY      for broken hand    INCISION AND DRAINAGE ABSCESS  2015    anal    PROSTATE SURGERY      SMALL INTESTINE SURGERY      TONSILLECTOMY        General Information       Row Name 11/04/23 1437          Physical Therapy Time and Intention    Document Type therapy note (daily note)  -CN     Mode of Treatment individual therapy;physical therapy  -CN       Row Name 11/04/23 1437          General Information    Patient Profile Reviewed yes  -CN     Existing Precautions/Restrictions fall  -CN       Row Name 11/04/23 1437          Cognition    Orientation Status (Cognition) oriented x 4  -CN       Row Name 11/04/23 1437          Safety Issues, Functional Mobility    Impairments Affecting Function (Mobility) balance;endurance/activity tolerance;strength;coordination  -CN               User Key  (r) = Recorded By, (t) = Taken By, (c) = Cosigned By      Initials Name Provider Type    CN Lauren Dominguez, PT Physical Therapist                   Mobility       Row Name 11/04/23 1437          Bed Mobility    Bed Mobility supine-sit;sit-supine  -CN     Supine-Sit [place] (Bed Mobility) standby assist  -CN     Sit-Supine [place] (Bed Mobility) standby assist  -CN     Assistive Device (Bed Mobility) bed rails;head of bed elevated  -CN       Row Name 11/04/23 1437          Sit-Stand Transfer    Sit-Stand [place] (Transfers) standby assist  -CN     Assistive Device (Sit-Stand Transfers) walker, front-wheeled  -CN       Row Name 11/04/23 1437          Gait/Stairs (Locomotion)    [place] Level (Gait) minimum  "assist (75% patient effort)  -CN     Assistive Device (Gait) walker, front-wheeled  -CN     Distance in Feet (Gait) 70'  -CN     Deviations/Abnormal Patterns (Gait) gait speed decreased;stride length decreased  -CN     Bilateral Gait Deviations forward flexed posture  -CN     Left Sided Gait Deviations foot drop/toe drag;heel strike decreased  -CN     Comment, (Gait/Stairs) cues to \"march\" L LE, dec step length as pt tends to take large step on the L, erect posture  -CN               User Key  (r) = Recorded By, (t) = Taken By, (c) = Cosigned By      Initials Name Provider Type    Lauren Strauss, PT Physical Therapist                   Obj/Interventions    No documentation.                  Goals/Plan    No documentation.                  Clinical Impression       Row Name 11/04/23 1439          Pain    Pretreatment Pain Rating 0/10 - no pain  -CN     Posttreatment Pain Rating 0/10 - no pain  -CN       Row Name 11/04/23 1439          Plan of Care Review    Plan of Care Reviewed With patient  -CN     Progress improving  -CN     Outcome Evaluation Pt agreeable to PT and able to increase ambulate distance to 80' this date with Rwx. Cues for safety with gait including marching L LE and dec step length as he tends to take long step on the L. Cues for erect posture at times as pt leans forward to watch foot placement. Pt continues to be a good candidate for skilled PT services.  -CN       Row Name 11/04/23 1439          Therapy Assessment/Plan (PT)    Rehab Potential (PT) good, to achieve stated therapy goals  -CN     Criteria for Skilled Interventions Met (PT) yes;skilled treatment is necessary  -CN     Therapy Frequency (PT) 6 times/wk  -CN               User Key  (r) = Recorded By, (t) = Taken By, (c) = Cosigned By      Initials Name Provider Type    Lauren Strauss, PT Physical Therapist                   Outcome Measures       Row Name 11/04/23 1441 11/04/23 0817       How much help from " another person do you currently need...    Turning from your back to your side while in flat bed without using bedrails? 4  -CN 4  -MM    Moving from lying on back to sitting on the side of a flat bed without bedrails? 4  -CN 3  -MM    Moving to and from a bed to a chair (including a wheelchair)? 3  -CN 3  -MM    Standing up from a chair using your arms (e.g., wheelchair, bedside chair)? 3  -CN 3  -MM    Climbing 3-5 steps with a railing? 2  -CN 2  -MM    To walk in hospital room? 3  -CN 3  -MM    AM-PAC 6 Clicks Score (PT) 19  -CN 18  -MM    Highest level of mobility 6 --> Walked 10 steps or more  -CN 6 --> Walked 10 steps or more  -MM      Row Name 11/04/23 1441          Functional Assessment    Outcome Measure Options AM-PAC 6 Clicks Basic Mobility (PT)  -CN               User Key  (r) = Recorded By, (t) = Taken By, (c) = Cosigned By      Initials Name Provider Type    Lauren Strauss, PT Physical Therapist    MM Ying Alcaraz, RN Registered Nurse                                 Physical Therapy Education       Title: PT OT SLP Therapies (Done)       Topic: Physical Therapy (Done)       Point: Mobility training (Done)       Learning Progress Summary             Patient Acceptance, E, VU by CN at 11/4/2023 1441    Acceptance, E,D, VU,DU,NR by EB at 11/3/2023 1557    Acceptance, E, VU,NR by EB at 11/2/2023 1608    Acceptance, E,D, VU,NR by EB at 11/2/2023 1604    Acceptance, E,D, VU,NR by EB at 11/1/2023 1015    Acceptance, E,D, VU,NR by EB at 10/31/2023 0953    Acceptance, E, VU by DB at 10/30/2023 0907    Acceptance, E,D, VU,NR by LW at 10/28/2023 1558    Acceptance, E, VU,NR by EM at 10/27/2023 1627                         Point: Home exercise program (Done)       Learning Progress Summary             Patient Acceptance, E, VU by CN at 11/4/2023 1441    Acceptance, E,D, VU,DU,NR by EB at 11/3/2023 1557    Acceptance, E,D, VU,NR by EB at 11/1/2023 1015    Acceptance, E,D, VU,NR by EB at 10/31/2023  0953    Acceptance, E, VU by DB at 10/30/2023 0907                         Point: Body mechanics (Done)       Learning Progress Summary             Patient Acceptance, E, VU by CN at 11/4/2023 1441    Acceptance, E,D, VU,DU,NR by EB at 11/3/2023 1557    Acceptance, E, VU,NR by EB at 11/2/2023 1608    Acceptance, E,D, VU,NR by EB at 11/2/2023 1604    Acceptance, E,D, VU,NR by EB at 11/1/2023 1015    Acceptance, E,D, VU,NR by EB at 10/31/2023 0953    Acceptance, E, VU by DB at 10/30/2023 0907                         Point: Precautions (Done)       Learning Progress Summary             Patient Acceptance, E, VU by CN at 11/4/2023 1441    Acceptance, E,D, VU,DU,NR by EB at 11/3/2023 1557    Acceptance, E, VU,NR by EB at 11/2/2023 1608    Acceptance, E,D, VU,NR by EB at 11/2/2023 1604    Acceptance, E,D, VU,NR by EB at 11/1/2023 1015    Acceptance, E,D, VU,NR by EB at 10/31/2023 0953    Acceptance, E, VU by DB at 10/30/2023 0907                                         User Key       Initials Effective Dates Name Provider Type Discipline    EM 06/16/21 -  Viviane Rivas, PT Physical Therapist PT    CN 06/16/21 -  Lauren Dominguez, PT Physical Therapist PT    EB 02/14/23 -  Michelle Fraser PTA Physical Therapist Assistant PT    DB 06/16/21 -  Marianne Mccullough, PT Physical Therapist PT    LW 05/08/23 -  Marog Mario, PT Physical Therapist PT                  PT Recommendation and Plan     Plan of Care Reviewed With: patient  Progress: improving  Outcome Evaluation: Pt agreeable to PT and able to increase ambulate distance to 80' this date with Rwx. Cues for safety with gait including marching L LE and dec step length as he tends to take long step on the L. Cues for erect posture at times as pt leans forward to watch foot placement. Pt continues to be a good candidate for skilled PT services.     Time Calculation:         PT Charges       Row Name 11/04/23 1442             Time Calculation    Start Time 0919  -CN       Stop Time 0929  -CN      Time Calculation (min) 10 min  -CN      PT Received On 11/04/23  -CN      PT - Next Appointment 11/05/23  -CN         Timed Charges    91312 - Gait Training Minutes  10  -CN         Total Minutes    Timed Charges Total Minutes 10  -CN       Total Minutes 10  -CN                User Key  (r) = Recorded By, (t) = Taken By, (c) = Cosigned By      Initials Name Provider Type    Lauren Strauss, CARIN Physical Therapist                  Therapy Charges for Today       Code Description Service Date Service Provider Modifiers Qty    [id number] HC GAIT TRAINING EA 15 MIN 11/4/2023 Lauren Dominguez, PT GP 1            PT G-Codes  Outcome Measure Options: AM-PAC 6 Clicks Basic Mobility (PT)  AM-PAC 6 Clicks Score (PT): 19  AM-PAC 6 Clicks Score (OT): 19  Modified [place] Scale: 4 - Moderately severe disability.  Unable to walk without assistance, and unable to attend to own bodily needs without assistance.  PT Discharge Summary  Anticipated Discharge Disposition (PT): inpatient rehabilitation facility    Lauren Dominguez PT  11/4/2023

## 2023-11-04 NOTE — PLAN OF CARE
Problem: Adult Inpatient Plan of Care  Goal: Plan of Care Review  Outcome: Ongoing, Progressing  Flowsheets (Taken 11/4/2023 0301)  Plan of Care Reviewed With: patient     Problem: Adult Inpatient Plan of Care  Goal: Absence of Hospital-Acquired Illness or Injury  Intervention: Identify and Manage Fall Risk  Recent Flowsheet Documentation  Taken 11/4/2023 0200 by Ashley Marinelli RN  Safety Promotion/Fall Prevention: safety round/check completed  Taken 11/4/2023 0000 by Ashley Marinelli RN  Safety Promotion/Fall Prevention: safety round/check completed  Taken 11/3/2023 2200 by Ashley Marinelli RN  Safety Promotion/Fall Prevention: safety round/check completed  Taken 11/3/2023 2000 by Ashley Marinelli RN  Safety Promotion/Fall Prevention: safety round/check completed  Intervention: Prevent Skin Injury  Recent Flowsheet Documentation  Taken 11/4/2023 0200 by Ashley Marinelli RN  Body Position: position changed independently  Taken 11/4/2023 0000 by Ashley Marinelli RN  Body Position: position changed independently  Taken 11/3/2023 2200 by Ashley Marinelli RN  Body Position: position changed independently  Taken 11/3/2023 2000 by Ashley Marinelli RN  Body Position: position changed independently  Skin Protection: adhesive use limited  Intervention: Prevent and Manage VTE (Venous Thromboembolism) Risk  Recent Flowsheet Documentation  Taken 11/3/2023 2000 by Ashley Marinelli RN  VTE Prevention/Management: patient refused intervention  Range of Motion: active ROM (range of motion) encouraged  Intervention: Prevent Infection  Recent Flowsheet Documentation  Taken 11/3/2023 2000 by Ashley Marinelli RN  Infection Prevention: single patient room provided  Goal: Optimal Comfort and Wellbeing  Intervention: Provide Person-Centered Care  Recent Flowsheet Documentation  Taken 11/3/2023 2000 by Ashley Marinelli RN  Trust Relationship/Rapport:   care explained   choices provided     Problem: Fall Injury Risk  Goal: Absence of Fall and  Fall-Related Injury  Intervention: Identify and Manage Contributors  Recent Flowsheet Documentation  Taken 11/4/2023 0200 by Ashley Marinelli RN  Medication Review/Management: medications reviewed  Taken 11/4/2023 0000 by Ashley Marinelli RN  Medication Review/Management: medications reviewed  Taken 11/3/2023 2200 by Ashley Marinelli RN  Medication Review/Management: medications reviewed  Taken 11/3/2023 2000 by Ashley Marinelli RN  Medication Review/Management: medications reviewed  Intervention: Promote Injury-Free Environment  Recent Flowsheet Documentation  Taken 11/4/2023 0200 by Ashley Marinelli RN  Safety Promotion/Fall Prevention: safety round/check completed  Taken 11/4/2023 0000 by Ashley Marinelli RN  Safety Promotion/Fall Prevention: safety round/check completed  Taken 11/3/2023 2200 by Ashley Marinelli RN  Safety Promotion/Fall Prevention: safety round/check completed  Taken 11/3/2023 2000 by Ashley Marinelli RN  Safety Promotion/Fall Prevention: safety round/check completed     Problem: Alcohol Withdrawal  Goal: Alcohol Withdrawal Symptom Control  Intervention: Minimize or Manage Alcohol Withdrawal Symptoms  Recent Flowsheet Documentation  Taken 11/3/2023 2000 by Ashley Marinelli RN  Sensory Stimulation Regulation: care clustered     Problem: Acute Neurologic Deterioration (Alcohol Withdrawal)  Goal: Optimal Neurologic Function  Intervention: Minimize or Manage Acute Neurologic Symptoms  Recent Flowsheet Documentation  Taken 11/3/2023 2000 by Ashley Marinelli RN  Sensory Stimulation Regulation: care clustered  Cerebral Perfusion Promotion: blood pressure monitored     Problem: Substance Misuse (Alcohol Withdrawal)  Goal: Readiness for Change Identified  Intervention: Partner to Facilitate Behavior Change  Recent Flowsheet Documentation  Taken 11/3/2023 2000 by Ashley Marinelli RN  Supportive Measures: active listening utilized  Intervention: Promote Psychosocial Wellbeing  Recent Flowsheet Documentation  Taken  11/3/2023 2000 by Ashley Marinelli RN  Family/Support System Care: self-care encouraged     Problem: Behavioral Health Comorbidity  Goal: Maintenance of Behavioral Health Symptom Control  Intervention: Maintain Behavioral Health Symptom Control  Recent Flowsheet Documentation  Taken 11/4/2023 0200 by Ashley Marinelli RN  Medication Review/Management: medications reviewed  Taken 11/4/2023 0000 by Ashley Marinelli RN  Medication Review/Management: medications reviewed  Taken 11/3/2023 2200 by Ashley Marinelli RN  Medication Review/Management: medications reviewed  Taken 11/3/2023 2000 by Ashley Marinelli RN  Medication Review/Management: medications reviewed     Problem: Hypertension Comorbidity  Goal: Blood Pressure in Desired Range  Intervention: Maintain Blood Pressure Management  Recent Flowsheet Documentation  Taken 11/4/2023 0200 by Ashley Marinelli RN  Medication Review/Management: medications reviewed  Taken 11/4/2023 0000 by Ashley Marinelli RN  Medication Review/Management: medications reviewed  Taken 11/3/2023 2200 by Ashley Marinelli RN  Medication Review/Management: medications reviewed  Taken 11/3/2023 2000 by Ashley Marinelli RN  Medication Review/Management: medications reviewed     Problem: VTE (Venous Thromboembolism)  Goal: VTE (Venous Thromboembolism) Symptom Resolution  Intervention: Prevent or Manage VTE (Venous Thromboembolism)  Recent Flowsheet Documentation  Taken 11/3/2023 2000 by Ashley Marinelli RN  VTE Prevention/Management: patient refused intervention     Problem: Decreased Participation/Engagement (Depressive Signs/Symptoms)  Goal: Increased Participation and Engagement (Depressive Signs/Symptoms)  Intervention: Facilitate Participation and Engagement  Recent Flowsheet Documentation  Taken 11/3/2023 2000 by Ashley Marinelli RN  Supportive Measures: active listening utilized     Problem: Mood Impairment (Depressive Signs/Symptoms)  Goal: Improved Mood Symptoms (Depressive  Signs/Symptoms)  Intervention: Promote Mood Improvement  Recent Flowsheet Documentation  Taken 11/3/2023 2000 by Ashley Marinelli, RN  Supportive Measures: active listening utilized   Goal Outcome Evaluation:  Plan of Care Reviewed With: patient

## 2023-11-04 NOTE — NURSING NOTE
Lesa VERNON notified that patient had 2 long runs of couplets on his heart monitor at 0230 and at 0115. Patient has some nausea that he has had for a few hours and has gotten better. No chest pain, dizziness or shortness of air noted. Also notified her that around 1445 patient had a 7 beat run of Vtach. New orders noted.

## 2023-11-04 NOTE — PROGRESS NOTES
Name: Flako Coreas ADMIT: 10/26/2023   : 1965  PCP: Akash Rodriguez Jr.,     MRN: [id number] LOS: 0 days   AGE/SEX: 58 y.o. male  ROOM: [facility]     Subjective   Subjective   No new issues. Hopeful to be able to go to acute rehab at discharge, we discussed it may not happen until Monday.       Objective   Objective   Vital Signs  Temp:  [98.2 °F (36.8 °C)-99 °F (37.2 °C)] 98.2 °F (36.8 °C)  Heart Rate:  [59-69] 61  Resp:  [16-18] 18  BP: (111-151)/(62-86) 111/62  SpO2:  [95 %-97 %] 95 %  on   ;   Device (Oxygen Therapy): room air  Body mass index is 29.66 kg/m².  Physical Exam  Vitals and nursing note reviewed.   Constitutional:       General: He is not in acute distress.  HENT:      Head: Normocephalic.      Mouth/Throat:      Mouth: Mucous membranes are moist.   Eyes:      Conjunctiva/sclera: Conjunctivae normal.   Cardiovascular:      Rate and Rhythm: Normal rate and regular rhythm.   Pulmonary:      Effort: Pulmonary effort is normal. No respiratory distress.      Breath sounds: No wheezing or rales.   Abdominal:      General: Bowel sounds are normal.      Palpations: Abdomen is soft.   Musculoskeletal:      Cervical back: Neck supple.      Right lower leg: No edema.      Left lower leg: No edema.   Skin:     General: Skin is warm and dry.   Neurological:      Mental Status: He is alert and oriented to person, place, and time.   Psychiatric:         Mood and Affect: Mood normal.         Behavior: Behavior normal.       Results Review     I reviewed the patient's new clinical results.  Results from last 7 days   Lab Units 23  0615 23  0643 23  0710 23  0450   WBC 10*3/mm3 7.32 7.10 7.19 7.56   HEMOGLOBIN g/dL 12.1* 12.7* 13.0 12.9*  12.9*   PLATELETS 10*3/mm3 242 231 258 247     Results from last 7 days   Lab Units 23  0615 23  0643 23  0710 23  0450   SODIUM mmol/L 145 143 143 140   POTASSIUM mmol/L 3.6 3.7 4.1 3.5   CHLORIDE mmol/L 109*  "109* 107 108*   CO2 mmol/L 24.6 24.6 25.9 22.1   BUN mg/dL 13 14 15 14   CREATININE mg/dL 0.95 0.93 1.13 1.00   GLUCOSE mg/dL 99 100* 95 98   EGFR mL/min/1.73 93.9 96.4 76.3 88.3       Results from last 7 days   Lab Units 11/04/23  0615 11/03/23  0643 11/02/23  0710 11/01/23  0450   CALCIUM mg/dL 9.0 9.3 9.1 8.8   MAGNESIUM mg/dL 1.8  --   --   --        No results found for: \"HGBA1C\", \"POCGLU\"    No radiology results for the last day    I have personally reviewed all medications:  Scheduled Medications  amLODIPine, 5 mg, Oral, Daily  aspirin, 81 mg, Oral, Daily  atorvastatin, 80 mg, Oral, Nightly  carvedilol, 6.25 mg, Oral, BID With Meals  folic acid, 1 mg, Oral, Daily  hydrALAZINE, 100 mg, Oral, Q8H  lidocaine 1% - EPINEPHrine 1:[id number], 10 mL, Injection, Once  lisinopril, 40 mg, Oral, Daily  melatonin, 10 mg, Oral, Nightly  multivitamin with minerals, 1 tablet, Oral, Daily  pantoprazole, 40 mg, Oral, Q AM  senna-docusate sodium, 2 tablet, Oral, BID  sodium chloride, 10 mL, Intravenous, Q12H  thiamine, 100 mg, Oral, Daily  ticagrelor, 60 mg, Oral, BID  vilazodone, 40 mg, Oral, Daily    Infusions   Diet  Diet: Cardiac Diets; Healthy Heart (2-3 Na+); Texture: Regular Texture (IDDSI 7); Fluid Consistency: Thin (IDDSI 0)    I have personally reviewed:  [x]  Laboratory   []  Microbiology   []  Radiology   [x]  EKG/Telemetry  []  Cardiology/Vascular   []  Pathology    []  Records       Assessment/Plan     Active Hospital Problems    Diagnosis  POA    **Vertigo [R42]  Yes    Alcohol abuse [F10.10]  Yes    Concussion [S06.0XAA]  Yes    Fall [W19.XXXA]  Yes    Head injury, closed, with concussion [S06.0XAA]  Yes    HTN (hypertension) [I10]  Yes    CVA (cerebral vascular accident) [I63.9]  Yes    Mixed hyperlipidemia [E78.2]  Yes      Resolved Hospital Problems    Diagnosis Date Resolved POA    TIA (transient ischemic attack) [G45.9] 10/27/2023 Yes       58 y.o. male admitted with Vertigo.    1.Head injury/concussion " secondary to vertigo from left cerebellar PICA severe stenosis, neurology did evaluate and MRI during this admission did not reveal any new acute infarcts or subacute infarcts.  Continue with aspirin, Brilinta and statins. [facility] has accepted; precert initiated per CCP, per RN- likely not until Monday.     2.  Subacute left cerebellar CVA diagnosed on 10/23/2023, treatment as mentioned above.     3.  Rectal bleeding, GI did evaluate and offered further work-up however patient opted to postpone work-up until he can come off Brilinta which is 90 days from start date.  Follow-up with GI as an outpatient basis. Hgb stable 12.7     4.  Hypertension, on amlodipine, Coreg, hydralazine. BP acceptable acutely     5.  Ethanol abuse, has been monitored for withdrawal and was on CIWA protocol, now stopped.  Continue with folate and thiamine supplements.  He was counseled extensively to quit drinking alcohol      SCDs for DVT prophylaxis.  Full code.  Discussed with patient and RN.  Anticipate discharge to [facility] once precert has been obtained..      Edelmira Trujillo MD  [facility]  11/04/23  10:40 EDT

## 2023-11-04 NOTE — PLAN OF CARE
Goal Outcome Evaluation:  Plan of Care Reviewed With: patient        Progress: improving  Outcome Evaluation: Pt agreeable to PT and able to increase ambulate distance to 80' this date with Rwx. Cues for safety with gait including marching L LE and dec step length as he tends to take long step on the L. Cues for erect posture at times as pt leans forward to watch foot placement. Pt continues to be a good candidate for skilled PT services.      Anticipated Discharge Disposition (PT): inpatient rehabilitation facility

## 2023-11-04 NOTE — PROGRESS NOTES
[facility]    Awaiting prior authorization from insurance company.  Will continue to monitor progress with therapies and medical stability.    Thank you,  Elizabeth Zaidi, RN  Rehab Admission Nurse

## 2023-11-05 LAB
ANION GAP SERPL CALCULATED.3IONS-SCNC: 10 MMOL/L (ref 5–15)
BASOPHILS # BLD AUTO: 0.03 10*3/MM3 (ref 0–0.2)
BASOPHILS NFR BLD AUTO: 0.5 % (ref 0–1.5)
BUN SERPL-MCNC: 14 MG/DL (ref 6–20)
BUN/CREAT SERPL: 13.1 (ref 7–25)
CALCIUM SPEC-SCNC: 8.8 MG/DL (ref 8.6–10.5)
CHLORIDE SERPL-SCNC: 107 MMOL/L (ref 98–107)
CO2 SERPL-SCNC: 26 MMOL/L (ref 22–29)
CREAT SERPL-MCNC: 1.07 MG/DL (ref 0.76–1.27)
DEPRECATED RDW RBC AUTO: 42.2 FL (ref 37–54)
EGFRCR SERPLBLD CKD-EPI 2021: 81.4 ML/MIN/1.73
EOSINOPHIL # BLD AUTO: 0.49 10*3/MM3 (ref 0–0.4)
EOSINOPHIL NFR BLD AUTO: 7.8 % (ref 0.3–6.2)
ERYTHROCYTE [DISTWIDTH] IN BLOOD BY AUTOMATED COUNT: 14.2 % (ref 12.3–15.4)
GLUCOSE SERPL-MCNC: 101 MG/DL (ref 65–99)
HCT VFR BLD AUTO: 35.8 % (ref 37.5–51)
HGB BLD-MCNC: 11.9 G/DL (ref 13–17.7)
IMM GRANULOCYTES # BLD AUTO: 0.02 10*3/MM3 (ref 0–0.05)
IMM GRANULOCYTES NFR BLD AUTO: 0.3 % (ref 0–0.5)
LYMPHOCYTES # BLD AUTO: 1.38 10*3/MM3 (ref 0.7–3.1)
LYMPHOCYTES NFR BLD AUTO: 22 % (ref 19.6–45.3)
MCH RBC QN AUTO: 27.3 PG (ref 26.6–33)
MCHC RBC AUTO-ENTMCNC: 33.2 G/DL (ref 31.5–35.7)
MCV RBC AUTO: 82.1 FL (ref 79–97)
MONOCYTES # BLD AUTO: 0.53 10*3/MM3 (ref 0.1–0.9)
MONOCYTES NFR BLD AUTO: 8.5 % (ref 5–12)
NEUTROPHILS NFR BLD AUTO: 3.82 10*3/MM3 (ref 1.7–7)
NEUTROPHILS NFR BLD AUTO: 60.9 % (ref 42.7–76)
NRBC BLD AUTO-RTO: 0 /100 WBC (ref 0–0.2)
PLATELET # BLD AUTO: 238 10*3/MM3 (ref 140–450)
PMV BLD AUTO: 9.9 FL (ref 6–12)
POTASSIUM SERPL-SCNC: 3.6 MMOL/L (ref 3.5–5.2)
RBC # BLD AUTO: 4.36 10*6/MM3 (ref 4.14–5.8)
SODIUM SERPL-SCNC: 143 MMOL/L (ref 136–145)
WBC NRBC COR # BLD: 6.27 10*3/MM3 (ref 3.4–10.8)

## 2023-11-05 PROCEDURE — G0378 HOSPITAL OBSERVATION PER HR: HCPCS

## 2023-11-05 PROCEDURE — 85025 COMPLETE CBC W/AUTO DIFF WBC: CPT | Performed by: STUDENT IN AN ORGANIZED HEALTH CARE EDUCATION/TRAINING PROGRAM

## 2023-11-05 PROCEDURE — 80048 BASIC METABOLIC PNL TOTAL CA: CPT | Performed by: STUDENT IN AN ORGANIZED HEALTH CARE EDUCATION/TRAINING PROGRAM

## 2023-11-05 RX ADMIN — PANTOPRAZOLE SODIUM 40 MG: 40 TABLET, DELAYED RELEASE ORAL at 05:04

## 2023-11-05 RX ADMIN — TICAGRELOR 60 MG: 60 TABLET ORAL at 20:07

## 2023-11-05 RX ADMIN — Medication 10 ML: at 20:07

## 2023-11-05 RX ADMIN — CARVEDILOL 6.25 MG: 6.25 TABLET, FILM COATED ORAL at 17:01

## 2023-11-05 RX ADMIN — ATORVASTATIN CALCIUM 80 MG: 80 TABLET, FILM COATED ORAL at 20:07

## 2023-11-05 RX ADMIN — VILAZODONE HYDROCHLORIDE 40 MG: 40 TABLET, FILM COATED ORAL at 09:55

## 2023-11-05 RX ADMIN — HYDROXYZINE HYDROCHLORIDE 50 MG: 25 TABLET ORAL at 22:41

## 2023-11-05 RX ADMIN — AMLODIPINE BESYLATE 5 MG: 5 TABLET ORAL at 09:55

## 2023-11-05 RX ADMIN — CARVEDILOL 6.25 MG: 6.25 TABLET, FILM COATED ORAL at 09:55

## 2023-11-05 RX ADMIN — FOLIC ACID 1 MG: 1 TABLET ORAL at 09:55

## 2023-11-05 RX ADMIN — LISINOPRIL 40 MG: 20 TABLET ORAL at 09:55

## 2023-11-05 RX ADMIN — SENNOSIDES AND DOCUSATE SODIUM 2 TABLET: 50; 8.6 TABLET ORAL at 20:07

## 2023-11-05 RX ADMIN — HYDROXYZINE HYDROCHLORIDE 50 MG: 25 TABLET ORAL at 13:14

## 2023-11-05 RX ADMIN — TICAGRELOR 60 MG: 60 TABLET ORAL at 09:55

## 2023-11-05 RX ADMIN — MULTIPLE VITAMINS W/ MINERALS TAB 1 TABLET: TAB at 09:55

## 2023-11-05 RX ADMIN — Medication 10 MG: at 20:07

## 2023-11-05 RX ADMIN — HYDRALAZINE HYDROCHLORIDE 100 MG: 50 TABLET ORAL at 05:04

## 2023-11-05 RX ADMIN — Medication 10 ML: at 09:56

## 2023-11-05 RX ADMIN — HYDRALAZINE HYDROCHLORIDE 100 MG: 50 TABLET ORAL at 20:08

## 2023-11-05 RX ADMIN — ASPIRIN 81 MG: 81 TABLET, COATED ORAL at 09:58

## 2023-11-05 RX ADMIN — HYDRALAZINE HYDROCHLORIDE 100 MG: 50 TABLET ORAL at 13:15

## 2023-11-05 RX ADMIN — Medication 100 MG: at 09:55

## 2023-11-05 NOTE — PROGRESS NOTES
Name: Flako Coreas ADMIT: 10/26/2023   : 1965  PCP: Akash Rodriguez Jr.,     MRN: [id number] LOS: 0 days   AGE/SEX: 58 y.o. male  ROOM: [facility]     Subjective   Subjective   Patient continues with rotational dizziness/unsteady gait//difficulty focusing with eyes/left-sided weakness and tingling.  No headache.  No loss of consciousness.  No seizures.    Review of Systems  Cardiovascular/respiratory.  No chest pain/no shortness of breath/no palpitation  GI.  No abdominal pain or nausea or vomiting.  Positive fresh bright blood per rectum at the end of the bowel movement.  No diarrhea or constipation.  .  No dysuria or hematuria.     Objective   Objective   Vital Signs  Temp:  [97.5 °F (36.4 °C)-98.8 °F (37.1 °C)] 98.6 °F (37 °C)  Heart Rate:  [62-74] 64  Resp:  [16-18] 18  BP: (120-157)/(71-94) 157/94  SpO2:  [96 %-97 %] 96 %  on   ;   Device (Oxygen Therapy): room air    Intake/Output Summary (Last 24 hours) at 2023 1337  Last data filed at 2023 1813  Gross per 24 hour   Intake 240 ml   Output --   Net 240 ml     Body mass index is 29.66 kg/m².      10/26/23  2012 10/26/23  2207   Weight: 111 kg (245 lb) 105 kg (231 lb)     Physical Exam  General.  Middle-aged gentleman.  He is alert and oriented x3.  In no apparent pain/distress/diaphoresis.  Normal mood and affect.  Eyes.  Pupils equal round and reactive.  Intact extraocular musculature.  No pallor or jaundice.  Oral cavity.  Moist mucous membrane.  Neck.  Supple.  No JVD.  No lymphadenopathy or thyromegaly.  Cardiovascular.  Regular rate and rhythm and grade 2 systolic murmur.  Chest.  Clear to auscultation bilaterally with no added sounds.  Abdomen.  Soft lax.  No tenderness.  No organomegaly.  No guarding or rebound.  Extremities.  No clubbing/cyanosis/edema.  CNS.  Grade 4/5 weakness on the left with decreased sensation on the left.  Gait and Romberg not assessed.    Results Review:      Results from last 7 days   Lab Units  "11/05/23  0438 11/04/23  0615 11/03/23  0643 11/02/23  0710 11/01/23  0450 10/31/23  0538 10/30/23  0433   SODIUM mmol/L 143 145 143 143 140 140 140   POTASSIUM mmol/L 3.6 3.6 3.7 4.1 3.5 3.6 3.9   CHLORIDE mmol/L 107 109* 109* 107 108* 110* 109*   CO2 mmol/L 26.0 24.6 24.6 25.9 22.1 22.5 21.0*   BUN mg/dL 14 13 14 15 14 13 12   CREATININE mg/dL 1.07 0.95 0.93 1.13 1.00 0.94 1.00   GLUCOSE mg/dL 101* 99 100* 95 98 94 89   CALCIUM mg/dL 8.8 9.0 9.3 9.1 8.8 8.9 8.8     Estimated Creatinine Clearance: 99.5 mL/min (by C-G formula based on SCr of 1.07 mg/dL).                      Results from last 7 days   Lab Units 11/04/23  0615   MAGNESIUM mg/dL 1.8           Invalid input(s): \"LDLCALC\"  Results from last 7 days   Lab Units 11/05/23  0438 11/04/23  0615 11/03/23  0643 11/02/23  0710 11/01/23  0450 10/31/23  2019 10/31/23  1220 10/31/23  0538 10/30/23  1232 10/30/23  0433   WBC 10*3/mm3 6.27 7.32 7.10 7.19 7.56  --   --  6.10  --  7.12   HEMOGLOBIN g/dL 11.9* 12.1* 12.7* 13.0 12.9*  12.9* 12.5* 12.8* 12.2*   < > 12.2*   HEMATOCRIT % 35.8* 36.9* 38.1 38.6 38.5  38.5 36.8* 37.8 35.8*   < > 36.4*   PLATELETS 10*3/mm3 238 242 231 258 247  --   --  205  --  205   MCV fL 82.1 84.2 83.7 81.4 81.4  --   --  81.7  --  82.5   MCH pg 27.3 27.6 27.9 27.4 27.3  --   --  27.9  --  27.7   MCHC g/dL 33.2 32.8 33.3 33.7 33.5  --   --  34.1  --  33.5   RDW % 14.2 14.7 14.7 14.3 14.4  --   --  14.4  --  14.4   RDW-SD fl 42.2 45.0 44.7 42.2 42.6  --   --  42.7  --  43.5   MPV fL 9.9 9.9 10.0 10.0 9.7  --   --  9.8  --  10.1   NEUTROPHIL % % 60.9 62.2 64.8 65.6 62.7  --   --  59.9  --  61.8   LYMPHOCYTE % % 22.0 20.8 19.6 19.1* 22.5  --   --  23.0  --  22.9   MONOCYTES % % 8.5 8.1 9.0 8.3 8.2  --   --  9.3  --  8.0   EOSINOPHIL % % 7.8* 7.8* 5.6 6.3* 5.8  --   --  7.0*  --  6.3*   BASOPHIL % % 0.5 0.8 0.4 0.4 0.5  --   --  0.5  --  0.7   IMM GRAN % % 0.3 0.3 0.6* 0.3 0.3  --   --  0.3  --  0.3   NEUTROS ABS 10*3/mm3 3.82 4.56 4.60 4.72 " 4.74  --   --  3.65  --  4.40   LYMPHS ABS 10*3/mm3 1.38 1.52 1.39 1.37 1.70  --   --  1.40  --  1.63   MONOS ABS 10*3/mm3 0.53 0.59 0.64 0.60 0.62  --   --  0.57  --  0.57   EOS ABS 10*3/mm3 0.49* 0.57* 0.40 0.45* 0.44*  --   --  0.43*  --  0.45*   BASOS ABS 10*3/mm3 0.03 0.06 0.03 0.03 0.04  --   --  0.03  --  0.05   IMMATURE GRANS (ABS) 10*3/mm3 0.02 0.02 0.04 0.02 0.02  --   --  0.02  --  0.02   NRBC /100 WBC 0.0 0.0 0.0 0.0 0.0  --   --  0.0  --  0.0    < > = values in this interval not displayed.                                           Imaging:  Imaging Results (Last 24 Hours)       ** No results found for the last 24 hours. **               I reviewed the patient's new clinical results / labs / tests / procedures      Assessment/Plan     Active Hospital Problems    Diagnosis  POA    **Vertigo [R42]  Yes    Alcohol abuse [F10.10]  Yes    Concussion [S06.0XAA]  Yes    Fall [W19.XXXA]  Yes    Head injury, closed, with concussion [S06.0XAA]  Yes    HTN (hypertension) [I10]  Yes    CVA (cerebral vascular accident) [I63.9]  Yes    Mixed hyperlipidemia [E78.2]  Yes      Resolved Hospital Problems    Diagnosis Date Resolved POA    TIA (transient ischemic attack) [G45.9] 10/27/2023 Yes           Vertigo secondary to left cerebellar stroke because of left severe cerebellar PICA a stenosis leading to falls which in turn led to fall leading to concussion and head injury.  CNS examination is stable.  Currently on high-dose Lipitor/aspirin/Brilinta.  Status post neurology consultation.  Repeat MRI of the brain was negative for any new CVA but positive for an old left cerebellar CVA.  On PT/OT  Rectal bleed.  Minimal.  Hemoglobin stable.  GI saw the patient and recommendation is to wait 3 months until the patient is off the Brilinta to perform a colonoscopy.  Hemoglobin is stable.  Hypertension.  Acutely acceptable on Norvasc/hydralazine/Coreg/lisinopril.  No evidence of angina or congestive heart failure.  History of  alcohol abuse.  No withdrawal.  Continue folic acid/multivitamin and thiamine.  VTE prophylaxis.  Sequential compression device.      Discussed my findings and plan of treatment with the patient.  Disposition.  To acute rehab once precertification is obtained.        Ester Bright MD  [facility]  11/05/23  13:37 EST

## 2023-11-05 NOTE — PLAN OF CARE
Problem: Adult Inpatient Plan of Care  Goal: Plan of Care Review  Outcome: Ongoing, Progressing  Flowsheets (Taken 11/5/2023 0311)  Plan of Care Reviewed With: patient  Goal: Patient-Specific Goal (Individualized)  Outcome: Ongoing, Progressing  Goal: Absence of Hospital-Acquired Illness or Injury  Outcome: Ongoing, Progressing  Intervention: Identify and Manage Fall Risk  Recent Flowsheet Documentation  Taken 11/5/2023 0156 by Ashley Marinelli RN  Safety Promotion/Fall Prevention: safety round/check completed  Taken 11/5/2023 0000 by Ashley Marinelli RN  Safety Promotion/Fall Prevention: safety round/check completed  Taken 11/4/2023 2200 by Ashley Marinelli RN  Safety Promotion/Fall Prevention: safety round/check completed  Taken 11/4/2023 2000 by Ashley Marinelli RN  Safety Promotion/Fall Prevention: safety round/check completed  Intervention: Prevent Skin Injury  Recent Flowsheet Documentation  Taken 11/5/2023 0156 by Ashley Marinelli RN  Body Position: position changed independently  Taken 11/5/2023 0000 by Ashley Marinelli RN  Body Position: position changed independently  Taken 11/4/2023 2200 by Ashley Marinelli RN  Body Position: position changed independently  Taken 11/4/2023 2000 by Ashley Marinelli RN  Body Position: position changed independently  Skin Protection: adhesive use limited  Intervention: Prevent and Manage VTE (Venous Thromboembolism) Risk  Recent Flowsheet Documentation  Taken 11/4/2023 2000 by Ashley Marinelli RN  Range of Motion: active ROM (range of motion) encouraged  Intervention: Prevent Infection  Recent Flowsheet Documentation  Taken 11/4/2023 2000 by Ashley Marinelli RN  Infection Prevention: single patient room provided  Goal: Optimal Comfort and Wellbeing  Outcome: Ongoing, Progressing  Intervention: Provide Person-Centered Care  Recent Flowsheet Documentation  Taken 11/4/2023 2000 by Ashley Marinelli RN  Trust Relationship/Rapport:   choices provided   care explained  Goal: Readiness for  Transition of Care  Outcome: Ongoing, Progressing     Problem: Fall Injury Risk  Goal: Absence of Fall and Fall-Related Injury  Outcome: Ongoing, Progressing  Intervention: Identify and Manage Contributors  Recent Flowsheet Documentation  Taken 11/5/2023 0156 by Ashley Marinelli RN  Medication Review/Management: medications reviewed  Taken 11/5/2023 0000 by Ashley Marinelli RN  Medication Review/Management: medications reviewed  Taken 11/4/2023 2200 by Ashley Marinelli RN  Medication Review/Management: medications reviewed  Taken 11/4/2023 2000 by Ashley Marinelli RN  Medication Review/Management: medications reviewed  Intervention: Promote Injury-Free Environment  Recent Flowsheet Documentation  Taken 11/5/2023 0156 by Ashley Marinelli RN  Safety Promotion/Fall Prevention: safety round/check completed  Taken 11/5/2023 0000 by Ashley Marinelli RN  Safety Promotion/Fall Prevention: safety round/check completed  Taken 11/4/2023 2200 by Ashley Marinelli RN  Safety Promotion/Fall Prevention: safety round/check completed  Taken 11/4/2023 2000 by Ashley Marinelli RN  Safety Promotion/Fall Prevention: safety round/check completed     Problem: Alcohol Withdrawal  Goal: Alcohol Withdrawal Symptom Control  Outcome: Ongoing, Progressing  Intervention: Minimize or Manage Alcohol Withdrawal Symptoms  Recent Flowsheet Documentation  Taken 11/4/2023 2000 by Ashley Marinelli RN  Sensory Stimulation Regulation: care clustered     Problem: Acute Neurologic Deterioration (Alcohol Withdrawal)  Goal: Optimal Neurologic Function  Outcome: Ongoing, Progressing  Intervention: Minimize or Manage Acute Neurologic Symptoms  Recent Flowsheet Documentation  Taken 11/4/2023 2000 by Ashley Marinelli RN  Sensory Stimulation Regulation: care clustered  Cerebral Perfusion Promotion: blood pressure monitored     Problem: Substance Misuse (Alcohol Withdrawal)  Goal: Readiness for Change Identified  Outcome: Ongoing, Progressing  Intervention: Partner to  Facilitate Behavior Change  Recent Flowsheet Documentation  Taken 11/4/2023 2000 by Ashley Marinelli RN  Supportive Measures: active listening utilized  Intervention: Promote Psychosocial Wellbeing  Recent Flowsheet Documentation  Taken 11/4/2023 2000 by Ashley Marinelli RN  Family/Support System Care: self-care encouraged     Problem: Behavioral Health Comorbidity  Goal: Maintenance of Behavioral Health Symptom Control  Outcome: Ongoing, Progressing  Intervention: Maintain Behavioral Health Symptom Control  Recent Flowsheet Documentation  Taken 11/5/2023 0156 by Ashley Marinelli RN  Medication Review/Management: medications reviewed  Taken 11/5/2023 0000 by Ashley Marinelli RN  Medication Review/Management: medications reviewed  Taken 11/4/2023 2200 by Ashley Marinelli RN  Medication Review/Management: medications reviewed  Taken 11/4/2023 2000 by Ashley Marinelli RN  Medication Review/Management: medications reviewed     Problem: Hypertension Comorbidity  Goal: Blood Pressure in Desired Range  Outcome: Ongoing, Progressing  Intervention: Maintain Blood Pressure Management  Recent Flowsheet Documentation  Taken 11/5/2023 0156 by Ashley Marinelli RN  Medication Review/Management: medications reviewed  Taken 11/5/2023 0000 by Ashley Marinelli RN  Medication Review/Management: medications reviewed  Taken 11/4/2023 2200 by Ashley Marinelli RN  Medication Review/Management: medications reviewed  Taken 11/4/2023 2000 by Ashley Marinelli RN  Medication Review/Management: medications reviewed     Problem: VTE (Venous Thromboembolism)  Goal: VTE (Venous Thromboembolism) Symptom Resolution  Outcome: Ongoing, Progressing     Problem: Activity and Energy Impairment (Depressive Signs/Symptoms)  Goal: Optimized Energy Level (Depressive Signs/Symptoms)  Outcome: Ongoing, Progressing     Problem: Cognitive Impairment (Depressive Signs/Symptoms)  Goal: Optimized Cognitive Function  Outcome: Ongoing, Progressing     Problem: Decreased  Participation/Engagement (Depressive Signs/Symptoms)  Goal: Increased Participation and Engagement (Depressive Signs/Symptoms)  Outcome: Ongoing, Progressing  Intervention: Facilitate Participation and Engagement  Recent Flowsheet Documentation  Taken 11/4/2023 2000 by Ashley Marinelli RN  Supportive Measures: active listening utilized     Problem: Feelings of Worthlessness, Hopelessness or Excessive Guilt (Depressive Signs/Symptoms)  Goal: Enhanced Self-Esteem and Confidence (Depressive Signs/Symptoms)  Outcome: Ongoing, Progressing     Problem: Mood Impairment (Depressive Signs/Symptoms)  Goal: Improved Mood Symptoms (Depressive Signs/Symptoms)  Outcome: Ongoing, Progressing  Intervention: Promote Mood Improvement  Recent Flowsheet Documentation  Taken 11/4/2023 2000 by Ashley Marinelli RN  Supportive Measures: active listening utilized     Problem: Nutrition Imbalance (Depressive Signs/Symptoms)  Goal: Optimized Nutrition Intake  Outcome: Ongoing, Progressing     Problem: Psychomotor Impairment (Depressive Signs/Symptoms)  Goal: Improved Psychomotor Symptoms (Depressive Signs/Symptoms)  Outcome: Ongoing, Progressing     Problem: Sleep Disturbance (Depressive Signs/Symptoms)  Goal: Improved Sleep (Depressive Signs/Symptoms)  Outcome: Ongoing, Progressing   Goal Outcome Evaluation:  Plan of Care Reviewed With: patient

## 2023-11-05 NOTE — PROGRESS NOTES
[facility] Acute [facility]pt [facility]    Spoke to Rachele for [facility] Medicaid, precert remains pending.  Plan admission if precert approved.    Thank you,  Elizabeth Zaidi,  RN  Rehab Admission Nurse

## 2023-11-06 ENCOUNTER — HOSPITAL ENCOUNTER (INPATIENT)
Facility: HOSPITAL | Age: 58
DRG: 090 | End: 2023-11-06
Attending: PHYSICAL MEDICINE & REHABILITATION | Admitting: PHYSICAL MEDICINE & REHABILITATION
Payer: COMMERCIAL

## 2023-11-06 VITALS
OXYGEN SATURATION: 97 % | BODY MASS INDEX: 29.65 KG/M2 | HEIGHT: 74 IN | RESPIRATION RATE: 16 BRPM | DIASTOLIC BLOOD PRESSURE: 85 MMHG | HEART RATE: 71 BPM | WEIGHT: 231 LBS | SYSTOLIC BLOOD PRESSURE: 150 MMHG | TEMPERATURE: 98.8 F

## 2023-11-06 DIAGNOSIS — S06.0X0D CLOSED HEAD INJURY WITH CONCUSSION, WITHOUT LOSS OF CONSCIOUSNESS, SUBSEQUENT ENCOUNTER: Primary | ICD-10-CM

## 2023-11-06 DIAGNOSIS — I69.30 LATE EFFECT OF CEREBROVASCULAR ACCIDENT (CVA): ICD-10-CM

## 2023-11-06 LAB
ALBUMIN SERPL-MCNC: 3.8 G/DL (ref 3.5–5.2)
ALP SERPL-CCNC: 61 U/L (ref 39–117)
ALT SERPL W P-5'-P-CCNC: 21 U/L (ref 1–41)
ANION GAP SERPL CALCULATED.3IONS-SCNC: 8.2 MMOL/L (ref 5–15)
AST SERPL-CCNC: 13 U/L (ref 1–40)
BASOPHILS # BLD AUTO: 0.03 10*3/MM3 (ref 0–0.2)
BASOPHILS NFR BLD AUTO: 0.5 % (ref 0–1.5)
BILIRUB CONJ SERPL-MCNC: <0.2 MG/DL (ref 0–0.3)
BILIRUB INDIRECT SERPL-MCNC: NORMAL MG/DL
BILIRUB SERPL-MCNC: 0.4 MG/DL (ref 0–1.2)
BUN SERPL-MCNC: 12 MG/DL (ref 6–20)
BUN/CREAT SERPL: 12.1 (ref 7–25)
CALCIUM SPEC-SCNC: 8.7 MG/DL (ref 8.6–10.5)
CHLORIDE SERPL-SCNC: 106 MMOL/L (ref 98–107)
CO2 SERPL-SCNC: 26.8 MMOL/L (ref 22–29)
CREAT SERPL-MCNC: 0.99 MG/DL (ref 0.76–1.27)
DEPRECATED RDW RBC AUTO: 43.3 FL (ref 37–54)
EGFRCR SERPLBLD CKD-EPI 2021: 89.4 ML/MIN/1.73
EOSINOPHIL # BLD AUTO: 0.51 10*3/MM3 (ref 0–0.4)
EOSINOPHIL NFR BLD AUTO: 8 % (ref 0.3–6.2)
ERYTHROCYTE [DISTWIDTH] IN BLOOD BY AUTOMATED COUNT: 14.4 % (ref 12.3–15.4)
GLUCOSE SERPL-MCNC: 96 MG/DL (ref 65–99)
HCT VFR BLD AUTO: 34.7 % (ref 37.5–51)
HGB BLD-MCNC: 11.5 G/DL (ref 13–17.7)
IMM GRANULOCYTES # BLD AUTO: 0.01 10*3/MM3 (ref 0–0.05)
IMM GRANULOCYTES NFR BLD AUTO: 0.2 % (ref 0–0.5)
LYMPHOCYTES # BLD AUTO: 1.5 10*3/MM3 (ref 0.7–3.1)
LYMPHOCYTES NFR BLD AUTO: 23.4 % (ref 19.6–45.3)
MCH RBC QN AUTO: 27.7 PG (ref 26.6–33)
MCHC RBC AUTO-ENTMCNC: 33.1 G/DL (ref 31.5–35.7)
MCV RBC AUTO: 83.6 FL (ref 79–97)
MONOCYTES # BLD AUTO: 0.54 10*3/MM3 (ref 0.1–0.9)
MONOCYTES NFR BLD AUTO: 8.4 % (ref 5–12)
NEUTROPHILS NFR BLD AUTO: 3.81 10*3/MM3 (ref 1.7–7)
NEUTROPHILS NFR BLD AUTO: 59.5 % (ref 42.7–76)
NRBC BLD AUTO-RTO: 0 /100 WBC (ref 0–0.2)
PLATELET # BLD AUTO: 223 10*3/MM3 (ref 140–450)
PMV BLD AUTO: 9.9 FL (ref 6–12)
POTASSIUM SERPL-SCNC: 3.5 MMOL/L (ref 3.5–5.2)
PROT SERPL-MCNC: 6 G/DL (ref 6–8.5)
RBC # BLD AUTO: 4.15 10*6/MM3 (ref 4.14–5.8)
SODIUM SERPL-SCNC: 141 MMOL/L (ref 136–145)
WBC NRBC COR # BLD: 6.4 10*3/MM3 (ref 3.4–10.8)

## 2023-11-06 PROCEDURE — 97535 SELF CARE MNGMENT TRAINING: CPT

## 2023-11-06 PROCEDURE — 80076 HEPATIC FUNCTION PANEL: CPT | Performed by: INTERNAL MEDICINE

## 2023-11-06 PROCEDURE — 80048 BASIC METABOLIC PNL TOTAL CA: CPT | Performed by: INTERNAL MEDICINE

## 2023-11-06 PROCEDURE — G0378 HOSPITAL OBSERVATION PER HR: HCPCS

## 2023-11-06 PROCEDURE — 97530 THERAPEUTIC ACTIVITIES: CPT

## 2023-11-06 PROCEDURE — 85025 COMPLETE CBC W/AUTO DIFF WBC: CPT | Performed by: INTERNAL MEDICINE

## 2023-11-06 RX ORDER — BISACODYL 5 MG/1
5 TABLET, DELAYED RELEASE ORAL DAILY PRN
Status: ACTIVE | OUTPATIENT
Start: 2023-11-06

## 2023-11-06 RX ORDER — AMOXICILLIN 250 MG
2 CAPSULE ORAL 2 TIMES DAILY
Status: DISPENSED | OUTPATIENT
Start: 2023-11-06

## 2023-11-06 RX ORDER — LISINOPRIL 40 MG/1
40 TABLET ORAL DAILY
Status: DISPENSED | OUTPATIENT
Start: 2023-11-07

## 2023-11-06 RX ORDER — MULTIPLE VITAMINS W/ MINERALS TAB 9MG-400MCG
1 TAB ORAL DAILY
Status: CANCELLED | OUTPATIENT
Start: 2023-11-07

## 2023-11-06 RX ORDER — ASPIRIN 81 MG/1
81 TABLET ORAL DAILY
Status: DISPENSED | OUTPATIENT
Start: 2023-11-07

## 2023-11-06 RX ORDER — CHOLECALCIFEROL (VITAMIN D3) 125 MCG
10 CAPSULE ORAL NIGHTLY
Status: DISPENSED | OUTPATIENT
Start: 2023-11-06

## 2023-11-06 RX ORDER — FOLIC ACID 1 MG/1
1 TABLET ORAL DAILY
Status: CANCELLED | OUTPATIENT
Start: 2023-11-07

## 2023-11-06 RX ORDER — POLYETHYLENE GLYCOL 3350 17 G/17G
17 POWDER, FOR SOLUTION ORAL DAILY PRN
Status: ACTIVE | OUTPATIENT
Start: 2023-11-06

## 2023-11-06 RX ORDER — BISACODYL 10 MG
10 SUPPOSITORY, RECTAL RECTAL DAILY PRN
Status: ACTIVE | OUTPATIENT
Start: 2023-11-06

## 2023-11-06 RX ORDER — POLYETHYLENE GLYCOL 3350 17 G/17G
17 POWDER, FOR SOLUTION ORAL DAILY PRN
Status: CANCELLED | OUTPATIENT
Start: 2023-11-06

## 2023-11-06 RX ORDER — CHOLECALCIFEROL (VITAMIN D3) 125 MCG
10 CAPSULE ORAL NIGHTLY
Status: CANCELLED | OUTPATIENT
Start: 2023-11-06

## 2023-11-06 RX ORDER — HYDROXYZINE HYDROCHLORIDE 25 MG/1
50 TABLET, FILM COATED ORAL 3 TIMES DAILY PRN
Status: DISPENSED | OUTPATIENT
Start: 2023-11-06

## 2023-11-06 RX ORDER — ASPIRIN 81 MG/1
81 TABLET ORAL DAILY
Status: CANCELLED | OUTPATIENT
Start: 2023-11-07

## 2023-11-06 RX ORDER — ACETAMINOPHEN 325 MG/1
650 TABLET ORAL EVERY 6 HOURS PRN
Status: ACTIVE | OUTPATIENT
Start: 2023-11-06

## 2023-11-06 RX ORDER — HYDRALAZINE HYDROCHLORIDE 50 MG/1
100 TABLET, FILM COATED ORAL EVERY 8 HOURS SCHEDULED
Status: CANCELLED | OUTPATIENT
Start: 2023-11-06

## 2023-11-06 RX ORDER — HYDROXYZINE HYDROCHLORIDE 25 MG/1
50 TABLET, FILM COATED ORAL 3 TIMES DAILY PRN
Status: CANCELLED | OUTPATIENT
Start: 2023-11-06

## 2023-11-06 RX ORDER — ATORVASTATIN CALCIUM 80 MG/1
80 TABLET, FILM COATED ORAL NIGHTLY
Status: CANCELLED | OUTPATIENT
Start: 2023-11-06

## 2023-11-06 RX ORDER — MULTIPLE VITAMINS W/ MINERALS TAB 9MG-400MCG
1 TAB ORAL DAILY
Status: DISPENSED | OUTPATIENT
Start: 2023-11-07

## 2023-11-06 RX ORDER — AMLODIPINE BESYLATE 5 MG/1
5 TABLET ORAL DAILY
Status: ON HOLD | COMMUNITY

## 2023-11-06 RX ORDER — ATORVASTATIN CALCIUM 80 MG/1
80 TABLET, FILM COATED ORAL NIGHTLY
Status: DISPENSED | OUTPATIENT
Start: 2023-11-06

## 2023-11-06 RX ORDER — LISINOPRIL 40 MG/1
40 TABLET ORAL DAILY
Status: CANCELLED | OUTPATIENT
Start: 2023-11-07

## 2023-11-06 RX ORDER — BISACODYL 5 MG/1
5 TABLET, DELAYED RELEASE ORAL DAILY PRN
Status: CANCELLED | OUTPATIENT
Start: 2023-11-06

## 2023-11-06 RX ORDER — VILAZODONE HYDROCHLORIDE 40 MG/1
40 TABLET ORAL DAILY
Status: DISPENSED | OUTPATIENT
Start: 2023-11-07

## 2023-11-06 RX ORDER — AMLODIPINE BESYLATE 5 MG/1
5 TABLET ORAL DAILY
Status: CANCELLED | OUTPATIENT
Start: 2023-11-07

## 2023-11-06 RX ORDER — VILAZODONE HYDROCHLORIDE 40 MG/1
40 TABLET ORAL DAILY
Status: CANCELLED | OUTPATIENT
Start: 2023-11-07

## 2023-11-06 RX ORDER — HYDRALAZINE HYDROCHLORIDE 50 MG/1
100 TABLET, FILM COATED ORAL EVERY 8 HOURS SCHEDULED
Status: DISPENSED | OUTPATIENT
Start: 2023-11-06

## 2023-11-06 RX ORDER — PANTOPRAZOLE SODIUM 40 MG/1
40 TABLET, DELAYED RELEASE ORAL
Status: CANCELLED | OUTPATIENT
Start: 2023-11-07

## 2023-11-06 RX ORDER — BISACODYL 10 MG
10 SUPPOSITORY, RECTAL RECTAL DAILY PRN
Status: CANCELLED | OUTPATIENT
Start: 2023-11-06

## 2023-11-06 RX ORDER — AMLODIPINE BESYLATE 5 MG/1
5 TABLET ORAL DAILY
Status: DISPENSED | OUTPATIENT
Start: 2023-11-07

## 2023-11-06 RX ORDER — AMOXICILLIN 250 MG
2 CAPSULE ORAL 2 TIMES DAILY
Status: CANCELLED | OUTPATIENT
Start: 2023-11-06

## 2023-11-06 RX ORDER — CARVEDILOL 6.25 MG/1
6.25 TABLET ORAL 2 TIMES DAILY WITH MEALS
Status: DISPENSED | OUTPATIENT
Start: 2023-11-06

## 2023-11-06 RX ORDER — PANTOPRAZOLE SODIUM 40 MG/1
40 TABLET, DELAYED RELEASE ORAL
Status: DISPENSED | OUTPATIENT
Start: 2023-11-07

## 2023-11-06 RX ORDER — FOLIC ACID 1 MG/1
1 TABLET ORAL DAILY
Status: DISPENSED | OUTPATIENT
Start: 2023-11-07

## 2023-11-06 RX ORDER — CARVEDILOL 6.25 MG/1
6.25 TABLET ORAL 2 TIMES DAILY WITH MEALS
Status: CANCELLED | OUTPATIENT
Start: 2023-11-06

## 2023-11-06 RX ORDER — ACETAMINOPHEN 325 MG/1
650 TABLET ORAL EVERY 6 HOURS PRN
Status: CANCELLED | OUTPATIENT
Start: 2023-11-06

## 2023-11-06 RX ADMIN — ASPIRIN 81 MG: 81 TABLET, COATED ORAL at 08:53

## 2023-11-06 RX ADMIN — Medication 10 ML: at 08:54

## 2023-11-06 RX ADMIN — CARVEDILOL 6.25 MG: 6.25 TABLET, FILM COATED ORAL at 16:57

## 2023-11-06 RX ADMIN — HYDRALAZINE HYDROCHLORIDE 100 MG: 50 TABLET, FILM COATED ORAL at 21:03

## 2023-11-06 RX ADMIN — VILAZODONE HYDROCHLORIDE 40 MG: 40 TABLET, FILM COATED ORAL at 08:53

## 2023-11-06 RX ADMIN — HYDRALAZINE HYDROCHLORIDE 100 MG: 50 TABLET ORAL at 06:09

## 2023-11-06 RX ADMIN — TICAGRELOR 60 MG: 60 TABLET ORAL at 08:53

## 2023-11-06 RX ADMIN — Medication 10 MG: at 20:34

## 2023-11-06 RX ADMIN — PANTOPRAZOLE SODIUM 40 MG: 40 TABLET, DELAYED RELEASE ORAL at 06:09

## 2023-11-06 RX ADMIN — Medication 100 MG: at 08:53

## 2023-11-06 RX ADMIN — ATORVASTATIN CALCIUM 80 MG: 80 TABLET, FILM COATED ORAL at 20:34

## 2023-11-06 RX ADMIN — CARVEDILOL 6.25 MG: 6.25 TABLET, FILM COATED ORAL at 08:53

## 2023-11-06 RX ADMIN — MULTIPLE VITAMINS W/ MINERALS TAB 1 TABLET: TAB at 08:53

## 2023-11-06 RX ADMIN — AMLODIPINE BESYLATE 5 MG: 5 TABLET ORAL at 08:53

## 2023-11-06 RX ADMIN — LISINOPRIL 40 MG: 20 TABLET ORAL at 08:53

## 2023-11-06 RX ADMIN — FOLIC ACID 1 MG: 1 TABLET ORAL at 08:53

## 2023-11-06 RX ADMIN — HYDRALAZINE HYDROCHLORIDE 100 MG: 50 TABLET ORAL at 14:11

## 2023-11-06 RX ADMIN — TICAGRELOR 60 MG: 60 TABLET ORAL at 20:34

## 2023-11-06 RX ADMIN — HYDROXYZINE HYDROCHLORIDE 50 MG: 25 TABLET ORAL at 08:56

## 2023-11-06 NOTE — H&P
[facility]   HISTORY AND PHYSICAL     Patient Name: Flako Coreas  : 1965  MRN: [id number]  Primary Care Physician:  Akash Rodriguez Jr.,   Date of admission: 23          Subjective   Subjective      Chief Complaint:   Status post concussion  History of CVA-aspirin/Brilinta/statin  Impaired mobility  Impaired self-care  Hypertension-amlodipine/carvedilol/hydralazine/lisinopril  Hyperlipidemia  Gastroesophageal reflux disease  Depression-Viibryd  DVT prophylaxis-on dual antiplatelet therapy.  SCDs     History of Present Illness  Patient is a 58-year-old male who admitted to [facility] on 2023.  He has a past medical history including but not limited to hypertension, hyperlipidemia, GERD and recent CVA presented to [facility] with dizziness and a fall.  Patient reported that he was getting out of the bathtub, felt dizzy that he described as a spinning sensation.  Also, patient report associated balance difficulty with coordination and diplopia.  Stated he fell and hit his head on the floor.    Stated that he has been having symptoms since his recent stroke back in August.  Reported left sided facial and left hand numbness.  Stated that he is having difficulty with short-term memory.    Per chart review patient was admitted to the hospital from  -  and at that time was found to be hypertensive and had an acute CVA and was placed on Plavix and discharged home.  Then again patient presented to the ED on 10/23/2023 complaining of left-sided facial numbness as well as some left hand numbness and was admitted to the hospital.  Patient had a CTA of his head and neck that showed no evidence of hemorrhagic conversion, there is delayed flow to the inferior medial left cerebral hemisphere in the region of the infarction without area of diminished cerebral blood flow.  These findings are similar to the one on 2023.The left post PICA  vertebral artery is very small. There is atherosclerotic disease involving the intracranial segment right vertebral artery and the basilar artery with mild to  moderate narrowing. Moderate narrowing origin of the right vertebral artery.     Patient was felt to have sustained a closed head injury likely concussion from fall.  MRI was negative for new stroke.  Continue on statin, aspirin, Brilinta.  Had minimal rectal bleeding.  Evaluated by gastroenterology.  Hemoglobin remained stable.  Plan is to wait 3 months before he can come off of Brilinta to undergo possible colonoscopy.  No further bleeding reported.  Blood pressure has been controlled.     He had impairments with his mobility and self-care  With Occupational Therapy on November 6-[He appears more off balanced today than most recent OT encounter. Pt with difficulty maintain standing balance without use of BUE. Pt completed mutliple dynamic balance activites today with OT including, standing to reach for cups all directions, standing to fold various clothing, functional mobility around room to reach for cups placed in dressors, drawers, countertops. Pt requires min A when standing without UE support, he has postural sway. He is very fearful of reaching/weight shifting when he has to put increase weight on his LLE, he continues to drag LLE during mobility efforts. ]  With physical therapy on November 5-[able to increase ambulate distance to 80' this date with Rwx. Cues for safety with gait including marching L LE and dec step length as he tends to take long step on the L. Cues for erect posture at times as pt leans forward to watch foot placement. ]     Given his functional impairments and comorbidities he is now admitted for acute inpatient rehabilitation     He complains of difficulty organizing his thoughts and completing his thoughts.  Complains of decreased balance.  Does not describe any focal weakness.  Fatigue continues to be an issue        Review of  Systems      Personal History      Medical History        Past Medical History:   Diagnosis Date    ADHD (attention deficit hyperactivity disorder)       On going issue    Anxiety      Lepe's esophagus      Benign prostatic hyperplasia Surgery in 12/2021    Clotting disorder Intestinal    Depression      Diverticulitis      Diverticulosis      Duodenitis      Enteritis      Failure to thrive (child)      Family history of blood clots      GERD (gastroesophageal reflux disease)      Hyperlipidemia      Hypertension      Hypertensive emergency      Low testosterone      Microcytosis      Pancreatitis      Pneumonia      PONV (postoperative nausea and vomiting)      Seasonal affective disorder      Shoulder injury      Stroke      Unexplained weight loss              Surgical History         Past Surgical History:   Procedure Laterality Date    COLON SURGERY        COLONOSCOPY        COLONOSCOPY N/A 12/11/2022     Procedure: COLONOSCOPY INTO CECUM WITH HOT SNARE POLYPECTOMY;  Surgeon: Albert Gallo MD;  Location: [facility] ENDOSCOPY;  Service: Gastroenterology;  Laterality: N/A;  PRE- GI BLEED  POST- DIVERTICULOSIS, POLYP    ENDOSCOPY N/A 12/10/2022     Procedure: ESOPHAGOGASTRODUODENOSCOPY;  Surgeon: Albert Gallo MD;  Location: [facility] ENDOSCOPY;  Service: Gastroenterology;  Laterality: N/A;  PRE- DARK STOOLS  POST- BARRETTS ESOPHAGUS    FRACTURE SURGERY   1980     for broken arm    FRACTURE SURGERY         for broken hand    INCISION AND DRAINAGE ABSCESS   2015     anal    PROSTATE SURGERY        SMALL INTESTINE SURGERY        TONSILLECTOMY                Family History: family history includes Stroke in his father and mother. Otherwise pertinent FHx was reviewed and not pertinent to current issue.     Social History:  reports that he has never smoked. He has never used smokeless tobacco. He reports current alcohol use of about 70.0 standard drinks of alcohol per week. He reports current drug use. Frequency:  1.00 time per week. Drug: Marijuana.  Patient will have assistance from his son after discharge  Patient lives alone in a second story apartment with 2 flights of stairs-20 steps-to enter.  Home Medications:  amLODIPine, aspirin, atorvastatin, carvedilol, hydrALAZINE, hydrOXYzine, hydroCHLOROthiazide, lisinopril, meclizine, spironolactone, ticagrelor, and vilazodone     Allergies:  No Known Allergies     Inter-facility transfer medications (From admission, onward)                    amLODIPine (NORVASC) tablet 5 mg  Daily               aspirin EC tablet 81 mg  Daily               atorvastatin (LIPITOR) tablet 80 mg  Nightly               carvedilol (COREG) tablet 6.25 mg  2 Times Daily With Meals               folic acid (FOLVITE) tablet 1 mg  (folic acid po OR IV)  Daily               hydrALAZINE (APRESOLINE) tablet 100 mg  Every 8 Hours Scheduled               lisinopril (PRINIVIL,ZESTRIL) tablet 40 mg  Daily               melatonin tablet 10 mg  Nightly               multivitamin with minerals 1 tablet  Daily               pantoprazole (PROTONIX) EC tablet 40 mg  Every Early Morning               sennosides-docusate (PERICOLACE) 8.6-50 MG per tablet 2 tablet  (Bowel Management Regimen)  2 Times Daily        See Hyperspace for full Linked Orders Report.          polyethylene glycol (MIRALAX) packet 17 g  (Bowel Management Regimen)  Daily PRN        See Hyperspace for full Linked Orders Report.          bisacodyl (DULCOLAX) EC tablet 5 mg  (Bowel Management Regimen)  Daily PRN        See Hyperspace for full Linked Orders Report.          bisacodyl (DULCOLAX) suppository 10 mg  (Bowel Management Regimen)  Daily PRN        See Hyperspace for full Linked Orders Report.          thiamine (VITAMIN B-1) tablet 100 mg  Daily               ticagrelor (BRILINTA) tablet 60 mg  2 Times Daily               vilazodone (VIIBRYD) tablet 40 mg  Daily               acetaminophen (TYLENOL) tablet 650 mg  Every 6 Hours PRN                hydrOXYzine (ATARAX) tablet 50 mg  3 Times Daily PRN                                        Objective   Objective      Vitals:   Temp:  [97 °F (36.1 °C)-99 °F (37.2 °C)] 98.8 °F (37.1 °C)  Heart Rate:  [63-81] 71  Resp:  [16] 16  BP: (131-150)/(73-85) 150/85     Physical Exam      MENTAL STATUS -  AWAKE / ALERT  HEENT- NCAT, PUPILS EQUALLY ROUND, SCLERAE ANICTERIC, CONJUNCTIVAE PINK, OP MOIST, NO JVD, EARS UNREMARKABLE EXTERNALLY  LUNGS - CTA, NO WHEEZES, RALES OR RHONCHI  HEART- RRR, NO RUB, MURMUR, OR GALLOP  ABD - NORMOACTIVE BOWEL SOUNDS, SOFT, NT. NO HEPATOSPLENOMEGALY APPRECIATED  EXT - NO EDEMA OR CYANOSIS  NEURO -oriented x4  MOTOR EXAM - RUE/RLE 5/5. LUE/LLE 5/5.         Result Review   Result Review:            Results from last 7 days   Lab Units 11/06/23 0607 11/05/23 0438 11/04/23 0615   WBC 10*3/mm3 6.40 6.27 7.32   HEMOGLOBIN g/dL 11.5* 11.9* 12.1*   HEMATOCRIT % 34.7* 35.8* 36.9*   PLATELETS 10*3/mm3 223 238 242             Results from last 7 days   Lab Units 11/06/23  0607 11/05/23 0438 11/04/23 0615   SODIUM mmol/L 141 143 145   POTASSIUM mmol/L 3.5 3.6 3.6   CHLORIDE mmol/L 106 107 109*   CO2 mmol/L 26.8 26.0 24.6   BUN mg/dL 12 14 13   CREATININE mg/dL 0.99 1.07 0.95   CALCIUM mg/dL 8.7 8.8 9.0   BILIRUBIN mg/dL 0.4  --   --    ALK PHOS U/L 61  --   --    ALT (SGPT) U/L 21  --   --    AST (SGOT) U/L 13  --   --    GLUCOSE mg/dL 96 101* 99                  Assessment & Plan  Assessment / Plan               Active Hospital Problems:       Active Hospital Problems     Diagnosis      **Vertigo      Alcohol abuse      Concussion      Fall      Head injury, closed, with concussion      HTN (hypertension)      CVA (cerebral vascular accident)      Mixed hyperlipidemia        Status post concussion     History of CVA-aspirin/Brilinta/statin     Impaired mobility  Impaired self-care  Impaired cognition     Hypertension-amlodipine/carvedilol/hydralazine/lisinopril     Hyperlipidemia      Gastroesophageal reflux disease     Depression-Viibryd     DVT prophylaxis-on dual antiplatelet therapy.  SCDs     GI bleed-evaluated by gastroenterology-Plan is to wait 3 months before he can come off of Brilinta to undergo possible colonoscopy.      Now admit for comprehensive acute inpatient rehabilitation .  This would be an interdisciplinary program with physical therapy 1 hour,  occupational therapy 1 hour, and speech therapy 1 hour, 5 days a week..  Rehabilitation nursing for carryover, monitoring of cardiac and neurologic   status, bowel and bladder, and skin  Ongoing physician follow-up.  Weekly team conferences.  Goals are to achieve a level of supervision with  mobility and self-care and improved balance.   Rehabilitation prognosis fair.  Medical prognosis fair.      Estimated length of stay is approximately 2 weeks, but is only an estimation.   The patient's functional status and clinical status is unchanged from preadmission assessment and the patient continues appropriate for acute inpatient rehabilitation.  Goal is for home with home health   therapies.  Barrier to discharge:.  Mobility and self-care- work on strength balance transfers gait actives of daily living to overcome.               DVT prophylaxis:  Mechanical DVT prophylaxis orders are present.     CODE STATUS:    Level Of Support Discussed With: Patient  Code Status (Patient has no pulse and is not breathing): CPR (Attempt to Resuscitate)  Medical Interventions (Patient has pulse or is breathing): Full Support     Admission Status:  I believe this patient meets inpatient status.     Pk Snow MD

## 2023-11-06 NOTE — PLAN OF CARE
Problem: Adult Inpatient Plan of Care  Goal: Plan of Care Review  Outcome: Ongoing, Progressing  Flowsheets (Taken 11/6/2023 0153)  Plan of Care Reviewed With: patient  Goal: Patient-Specific Goal (Individualized)  Outcome: Ongoing, Progressing  Goal: Absence of Hospital-Acquired Illness or Injury  Outcome: Ongoing, Progressing  Intervention: Identify and Manage Fall Risk  Recent Flowsheet Documentation  Taken 11/5/2023 2358 by Ashley Marinelli RN  Safety Promotion/Fall Prevention: safety round/check completed  Taken 11/5/2023 2153 by Ashley Marinelli RN  Safety Promotion/Fall Prevention: safety round/check completed  Taken 11/5/2023 2030 by Ashley Marinelli RN  Safety Promotion/Fall Prevention: safety round/check completed  Intervention: Prevent Skin Injury  Recent Flowsheet Documentation  Taken 11/5/2023 2358 by Ashley Marinelli RN  Body Position: position changed independently  Taken 11/5/2023 2153 by Ashley Marinelli RN  Body Position: position changed independently  Taken 11/5/2023 2030 by Ashley Marinelli RN  Body Position: position changed independently  Skin Protection: adhesive use limited  Intervention: Prevent and Manage VTE (Venous Thromboembolism) Risk  Recent Flowsheet Documentation  Taken 11/5/2023 2030 by Ashley Marinelli RN  Range of Motion: active ROM (range of motion) encouraged  Intervention: Prevent Infection  Recent Flowsheet Documentation  Taken 11/5/2023 2030 by Ashley Marinelli RN  Infection Prevention: single patient room provided  Goal: Optimal Comfort and Wellbeing  Outcome: Ongoing, Progressing  Intervention: Provide Person-Centered Care  Recent Flowsheet Documentation  Taken 11/5/2023 2030 by Ashley Marinelli RN  Trust Relationship/Rapport:   care explained   choices provided  Goal: Readiness for Transition of Care  Outcome: Ongoing, Progressing     Problem: Fall Injury Risk  Goal: Absence of Fall and Fall-Related Injury  Outcome: Ongoing, Progressing  Intervention: Identify and Manage  Contributors  Recent Flowsheet Documentation  Taken 11/5/2023 2358 by Ashley Marinelli RN  Medication Review/Management: medications reviewed  Taken 11/5/2023 2153 by Ashley Marinelli RN  Medication Review/Management: medications reviewed  Taken 11/5/2023 2030 by Ashley Marinelli RN  Medication Review/Management: medications reviewed  Intervention: Promote Injury-Free Environment  Recent Flowsheet Documentation  Taken 11/5/2023 2358 by Ashley Marinelli RN  Safety Promotion/Fall Prevention: safety round/check completed  Taken 11/5/2023 2153 by Ashley Marinelli RN  Safety Promotion/Fall Prevention: safety round/check completed  Taken 11/5/2023 2030 by Ashley Marinelli RN  Safety Promotion/Fall Prevention: safety round/check completed     Problem: Alcohol Withdrawal  Goal: Alcohol Withdrawal Symptom Control  Outcome: Ongoing, Progressing  Intervention: Minimize or Manage Alcohol Withdrawal Symptoms  Recent Flowsheet Documentation  Taken 11/5/2023 2030 by Ashley Marinelli RN  Sensory Stimulation Regulation: care clustered     Problem: Acute Neurologic Deterioration (Alcohol Withdrawal)  Goal: Optimal Neurologic Function  Outcome: Ongoing, Progressing  Intervention: Minimize or Manage Acute Neurologic Symptoms  Recent Flowsheet Documentation  Taken 11/5/2023 2030 by Ashley Marinelli RN  Sensory Stimulation Regulation: care clustered  Cerebral Perfusion Promotion: blood pressure monitored     Problem: Substance Misuse (Alcohol Withdrawal)  Goal: Readiness for Change Identified  Outcome: Ongoing, Progressing  Intervention: Promote Psychosocial Wellbeing  Recent Flowsheet Documentation  Taken 11/5/2023 2030 by Ashley Marinelli RN  Family/Support System Care: self-care encouraged     Problem: Behavioral Health Comorbidity  Goal: Maintenance of Behavioral Health Symptom Control  Outcome: Ongoing, Progressing  Intervention: Maintain Behavioral Health Symptom Control  Recent Flowsheet Documentation  Taken 11/5/2023 2358 by Yves  THEA Ramirez  Medication Review/Management: medications reviewed  Taken 11/5/2023 2153 by Ashley Marinelli RN  Medication Review/Management: medications reviewed  Taken 11/5/2023 2030 by Ashley Marinelli RN  Medication Review/Management: medications reviewed     Problem: Hypertension Comorbidity  Goal: Blood Pressure in Desired Range  Outcome: Ongoing, Progressing  Intervention: Maintain Blood Pressure Management  Recent Flowsheet Documentation  Taken 11/5/2023 2358 by Ashley Marinelli RN  Medication Review/Management: medications reviewed  Taken 11/5/2023 2153 by Ashley Marinelli RN  Medication Review/Management: medications reviewed  Taken 11/5/2023 2030 by Ashley Marinelli RN  Medication Review/Management: medications reviewed     Problem: VTE (Venous Thromboembolism)  Goal: VTE (Venous Thromboembolism) Symptom Resolution  Outcome: Ongoing, Progressing     Problem: Activity and Energy Impairment (Depressive Signs/Symptoms)  Goal: Optimized Energy Level (Depressive Signs/Symptoms)  Outcome: Ongoing, Progressing     Problem: Cognitive Impairment (Depressive Signs/Symptoms)  Goal: Optimized Cognitive Function  Outcome: Ongoing, Progressing     Problem: Decreased Participation/Engagement (Depressive Signs/Symptoms)  Goal: Increased Participation and Engagement (Depressive Signs/Symptoms)  Outcome: Ongoing, Progressing     Problem: Feelings of Worthlessness, Hopelessness or Excessive Guilt (Depressive Signs/Symptoms)  Goal: Enhanced Self-Esteem and Confidence (Depressive Signs/Symptoms)  Outcome: Ongoing, Progressing     Problem: Mood Impairment (Depressive Signs/Symptoms)  Goal: Improved Mood Symptoms (Depressive Signs/Symptoms)  Outcome: Ongoing, Progressing     Problem: Nutrition Imbalance (Depressive Signs/Symptoms)  Goal: Optimized Nutrition Intake  Outcome: Ongoing, Progressing     Problem: Psychomotor Impairment (Depressive Signs/Symptoms)  Goal: Improved Psychomotor Symptoms (Depressive Signs/Symptoms)  Outcome:  Ongoing, Progressing     Problem: Sleep Disturbance (Depressive Signs/Symptoms)  Goal: Improved Sleep (Depressive Signs/Symptoms)  Outcome: Ongoing, Progressing   Goal Outcome Evaluation:  Plan of Care Reviewed With: patient

## 2023-11-06 NOTE — PLAN OF CARE
Goal Outcome Evaluation:  Plan of Care Reviewed With: patient        Progress: improving  Outcome Evaluation: Pt participated in OT today. He appears more off balanced today than most recent OT encounter. Pt with difficulty maintain standing balance without use of BUE. Pt completed mutliple dynamic balance activites today with OT including, standing to reach for cups all directions, standing to fold various clothing, functional mobility around room to reach for cups placed in dressors, drawers, countertops. Pt requires min A when standing without UE support, he has postural sway. He is very fearful of reaching/weight shifting when he has to put increase weight on his LLE, he continues to drag LLE during mobility efforts. Pt remains a falls risk and would continue to benefit from ongoing rehab at acute rehab setting at [place].      Anticipated Discharge Disposition (OT): inpatient rehabilitation facility

## 2023-11-06 NOTE — PROGRESS NOTES
Continued Stay Note  [facility]     Patient Name: Flako Coreas  MRN: [id number]  Today's Date: 11/6/2023    Admit Date: 10/26/2023    Plan: [facility] acute rehab pending [facility] Medicaid pre-cert, initiated 11/3   Discharge Plan       Row Name 11/06/23 0950       Plan    Plan [facility] acute rehab pending [facility] Medicaid pre-cert, initiated 11/3    Patient/Family in Agreement with Plan yes    Plan Comments [facility] acute rehab continues to await [facility] Medicaid pre-cert, initiated 11/3. Sophia Fields LCSW                   Discharge Codes    No documentation.                 Expected Discharge Date and Time       Expected Discharge Date Expected Discharge Time    Nov 6, 2023               Yamilet Fields LCSW

## 2023-11-06 NOTE — PROGRESS NOTES
Inpatient Rehabilitation Plan of Care Note    Plan of Care  Care Plan Reviewed - No updates at this time.    Safety    [RN] Potential for Injury(Active)  Current Status(11/17/2023): Uses call light appropriately  Weekly Goal(11/17/2023): Cues to use call light  Discharge Goal: Items within reach        Psychosocial    [RN] Behavior(Active)  Current Status(11/16/2023): Anxity related to hx of strokes and fall  Weekly Goal(11/16/2023): Allow opportunity to express concers regarding current  status.  Discharge Goal: Demonstrate healthy oping strategies.        Sphincter Control    [RN] Bowel Management(Active)  Current Status(11/16/2023): continent 100%  Weekly Goal(11/16/2023): continet 100%  Discharge Goal: continent 100%    [RN] Bladder Management(Active)  Current Status(11/16/2023): continent 100%  Weekly Goal(11/16/2023): Continent 100%  Discharge Goal: Continent 100%    Signed by: Ashley Rollins RN

## 2023-11-06 NOTE — THERAPY TREATMENT NOTE
Patient Name: Flako Coreas  : 1965    MRN: [id number]                              Today's Date: 2023       Admit Date: 10/26/2023    Visit Dx:     ICD-10-CM ICD-9-CM   1. Vertigo  R42 780.4   2. Closed head injury, initial encounter  S09.90XA 959.01   3. Laceration of forehead, initial encounter  S01.81XA 873.42   4. Elevated serum creatinine  R79.89 790.99   5. 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    Acute CVA (cerebrovascular accident)    HTN (hypertension)    Late effect of cerebrovascular accident (CVA)    Concussion    Fall    Head injury, closed, with concussion    Alcohol abuse     Past Medical History:   Diagnosis Date    ADHD (attention deficit hyperactivity disorder)     On going issue    Anxiety     Lepe's esophagus     Benign prostatic hyperplasia Surgery in 2021    Clotting disorder Intestinal    Depression     Diverticulitis     Diverticulosis     Duodenitis     Enteritis     Failure to thrive (child)     Family history of blood clots     GERD (gastroesophageal reflux disease)     Hyperlipidemia     Hypertension     Hypertensive emergency     Low testosterone     Microcytosis     Pancreatitis     Pneumonia     PONV (postoperative nausea and vomiting)     Seasonal affective disorder     Shoulder injury     Stroke     Unexplained weight loss      Past Surgical History:   Procedure Laterality Date    COLON SURGERY      COLONOSCOPY      COLONOSCOPY N/A 2022    Procedure: COLONOSCOPY INTO CECUM WITH HOT SNARE POLYPECTOMY;  Surgeon: Albert Gallo  MD LIBBY;  Location: [facility] ENDOSCOPY;  Service: Gastroenterology;  Laterality: N/A;  PRE- GI BLEED  POST- DIVERTICULOSIS, POLYP    ENDOSCOPY N/A 12/10/2022    Procedure: ESOPHAGOGASTRODUODENOSCOPY;  Surgeon: Albert Gallo MD;  Location: [facility] ENDOSCOPY;  Service: Gastroenterology;  Laterality: N/A;  PRE- DARK STOOLS  POST- BARRETTS ESOPHAGUS    FRACTURE SURGERY  1980    for broken arm    FRACTURE SURGERY      for broken hand    INCISION AND DRAINAGE ABSCESS  2015    anal    PROSTATE SURGERY      SMALL INTESTINE SURGERY      TONSILLECTOMY        General Information       Row Name 11/06/23 1206          OT Time and Intention    Document Type therapy note (daily note)  -     Mode of Treatment occupational therapy;individual therapy  -       Row Name 11/06/23 1206          General Information    Patient Profile Reviewed yes  -     Existing Precautions/Restrictions fall  -       Row Name 11/06/23 1206          Cognition    Orientation Status (Cognition) oriented x 4  -       Row Name 11/06/23 1206          Safety Issues, Functional Mobility    Impairments Affecting Function (Mobility) balance;strength;endurance/activity tolerance  dizziness  -               User Key  (r) = Recorded By, (t) = Taken By, (c) = Cosigned By      Initials Name Provider Type     Rafaela Bhatti, OT Occupational Therapist                     Mobility/ADL's       Row Name 11/06/23 1206          Bed Mobility    Supine-Sit [place] (Bed Mobility) standby assist  -     Sit-Supine [place] (Bed Mobility) standby assist  -       Row Name 11/06/23 1206          Transfers    Comment, (Transfers) multiple reps for sit to stand during functional activites, pt with heavy use of UEs to complete.  -       Row Name 11/06/23 1206          Sit-Stand Transfer    Sit-Stand [place] (Transfers) contact guard  -     Assistive Device (Sit-Stand Transfers) walker, front-wheeled  -       Row Name 11/06/23 1206           Functional Mobility    Functional Mobility- Ind. Level contact guard assist;minimum assist (75% patient effort)  -     Functional Mobility- Device walker, front-wheeled  -       Row Name 11/06/23 1206          Lower Body Dressing Assessment/Training    [place] Level (Lower Body Dressing) don;socks;standby assist  -     Position (Lower Body Dressing) edge of bed sitting  -       Row Name 11/06/23 1206          Grooming Assessment/Training    [place] Level (Grooming) oral care regimen;contact guard assist  -     Position (Grooming) sink side;supported standing  -               User Key  (r) = Recorded By, (t) = Taken By, (c) = Cosigned By      Initials Name Provider Type    Rafaela Andino OT Occupational Therapist                   Obj/Interventions       Row Name 11/06/23 1208          Motor Skills    Functional Endurance fair +. pt requires multiple seated rest breaks. Also dizziness contributing.  -       Row Name 11/06/23 1208          Balance    Static Sitting Balance independent  -     Dynamic Sitting Balance standby assist  -     Position, Sitting Balance unsupported  -     Static Standing Balance contact guard  -     Dynamic Standing Balance minimal assist  -SM     Position/Device Used, Standing Balance supported;walker, rolling  -SM     Balance Interventions standing;weight shifting activity;dynamic reaching;UE activity with balance activity  -               User Key  (r) = Recorded By, (t) = Taken By, (c) = Cosigned By      Initials Name Provider Type    Rafaela Andino OT Occupational Therapist                   Goals/Plan    No documentation.                  Clinical Impression       Row Name 11/06/23 1209          Pain Assessment    Pretreatment Pain Rating 0/10 - no pain  -     Posttreatment Pain Rating 0/10 - no pain  -       Row Name 11/06/23 1209          Plan of Care Review    Plan of Care Reviewed With patient  -     Outcome Evaluation Pt  participated in OT today. He appears more off balanced today than most recent OT encounter. Pt with difficulty maintain standing balance without use of BUE. Pt completed mutliple dynamic balance activites today with OT including, standing to reach for cups all directions, standing to fold various clothing, functional mobility around room to reach for cups placed in dressors, drawers, countertops. Pt requires min A when standing without UE support, he has postural sway. He is very fearful of reaching/weight shifting when he has to put increase weight on his LLE, he continues to drag LLE during mobility efforts. Pt remains a falls risk and would continue to benefit from ongoing rehab at acute rehab setting at [place].  -       Row Name 11/06/23 1209          Therapy Plan Review/Discharge Plan (OT)    Anticipated Discharge Disposition (OT) inpatient rehabilitation facility  -       Row Name 11/06/23 1209          Positioning and Restraints    Pre-Treatment Position in bed  -SM     Post Treatment Position bed  -SM     In Bed fowlers;call light within reach;encouraged to call for assist;notified ns  -               User Key  (r) = Recorded By, (t) = Taken By, (c) = Cosigned By      Initials Name Provider Type    Rafaela Andino, OT Occupational Therapist                   Outcome Measures       Row Name 11/06/23 1212          How much help from another is currently needed...    Putting on and taking off regular lower body clothing? 3  -SM     Bathing (including washing, rinsing, and drying) 3  -SM     Toileting (which includes using toilet bed pan or urinal) 3  -SM     Putting on and taking off regular upper body clothing 3  -SM     Taking care of personal grooming (such as brushing teeth) 3  -SM     Eating meals 4  -SM     AM-PAC 6 Clicks Score (OT) 19  -SM       Row Name 11/06/23 1694          How much help from another person do you currently need...    Turning from your back to your side while in flat bed  without using bedrails? 4  -JM     Moving from lying on back to sitting on the side of a flat bed without bedrails? 4  -JM     Moving to and from a bed to a chair (including a wheelchair)? 3  -JM     Standing up from a chair using your arms (e.g., wheelchair, bedside chair)? 3  -JM     Climbing 3-5 steps with a railing? 3  -JM     To walk in hospital room? 3  -JM     AM-PAC 6 Clicks Score (PT) 20  -JM     Highest level of mobility 6 --> Walked 10 steps or more  -       Row Name 11/06/23 1212          Functional Assessment    Outcome Measure Options AM-PAC 6 Clicks Daily Activity (OT)  -               User Key  (r) = Recorded By, (t) = Taken By, (c) = Cosigned By      Initials Name Provider Type    Rachele Saldivar, RN Registered Nurse    Rafaela Andino, SERA Occupational Therapist                    Occupational Therapy Education       Title: PT OT SLP Therapies (Done)       Topic: Occupational Therapy (Done)       Point: ADL training (Done)       Description:   Instruct learner(s) on proper safety adaptation and remediation techniques during self care or transfers.   Instruct in proper use of assistive devices.                  Learning Progress Summary             Patient Acceptance, E,D,TB, VU by VS at 10/27/2023 1540    Comment: OT instructed the pt. is exercises whaich her can complete to work on both cordination and strengthening with the (L)UE                         Point: Home exercise program (Done)       Description:   Instruct learner(s) on appropriate technique for monitoring, assisting and/or progressing therapeutic exercises/activities.                  Learning Progress Summary             Patient Acceptance, E,D,TB, VU by VS at 10/27/2023 1540    Comment: OT instructed the pt. is exercises whaich her can complete to work on both cordination and strengthening with the (L)UE                                         User Key       Initials Effective Dates Name Provider Type Discipline    VS  06/16/21 -  Agatha Meraz OTR Occupational Therapist OT                  OT Recommendation and Plan     Plan of Care Review  Plan of Care Reviewed With: patient  Progress: improving  Outcome Evaluation: Pt participated in OT today. He appears more off balanced today than most recent OT encounter. Pt with difficulty maintain standing balance without use of BUE. Pt completed mutliple dynamic balance activites today with OT including, standing to reach for cups all directions, standing to fold various clothing, functional mobility around room to reach for cups placed in dressors, drawers, countertops. Pt requires min A when standing without UE support, he has postural sway. He is very fearful of reaching/weight shifting when he has to put increase weight on his LLE, he continues to drag LLE during mobility efforts. Pt remains a falls risk and would continue to benefit from ongoing rehab at acute rehab setting at [place].     Time Calculation:         Time Calculation- OT       Row Name 11/06/23 1212             Time Calculation- OT    OT Start Time 1109  -SM      OT Stop Time 1135  -SM      OT Time Calculation (min) 26 min  -SM      Total Timed Code Minutes- OT 26 minute(s)  -SM      OT Received On 11/06/23  -SM      OT - Next Appointment 11/07/23  -         Timed Charges    23615 - OT Therapeutic Activity Minutes 18  -SM      66939 - OT Self Care/Mgmt Minutes 8  -SM         Total Minutes    Timed Charges Total Minutes 26  -SM       Total Minutes 26  -SM                User Key  (r) = Recorded By, (t) = Taken By, (c) = Cosigned By      Initials Name Provider Type     Rafaela Bhatti OT Occupational Therapist                  Therapy Charges for Today       Code Description Service Date Service Provider Modifiers Qty    13951580653 HC OT THERAPEUTIC ACT EA 15 MIN 11/6/2023 Rafaela Bhatti OT GO 1    85088984777 HC OT SELF CARE/MGMT/TRAIN EA 15 MIN 11/6/2023 Rafaela Bhatti OT GO 1                 Rafaela  SERA Bhatti  11/6/2023

## 2023-11-06 NOTE — PROGRESS NOTES
[facility] Acute Rehab    Spoke with [facility] [employer]. Case is still under review. WIll continue to await precert    Lo Han RN  Acute Rehab Admission Nurse      1130 received call pt has been approved. Please complete the DC/ Readmit along with a DC Summary before moving to Acute Rehab.   Call placed and msg left to inform CCP

## 2023-11-06 NOTE — PROGRESS NOTES
Case Management Discharge Note      Final Note: [facility] has obtained [facility] Medicaid pre-cert and can accept pt today. YUDI JOHNSTON notified. Sophia Fields LCSW    Provided Post Acute Provider List?: Yes  Post Acute Provider List:  (ST rehab)  Delivered To: Patient  Method of Delivery: In person    Selected Continued Care - Admitted Since 10/26/2023       Destination Coordination complete.      Service Provider Selected Services Address Phone Fax Patient Preferred    [facility] ACUTE REHAB PROGRAM Inpatient Rehabilitation [address] [address] [phone] -- --              Durable Medical Equipment    No services have been selected for the patient.                Dialysis/Infusion    No services have been selected for the patient.                Home Medical Care    No services have been selected for the patient.                Therapy    No services have been selected for the patient.                Community Resources    No services have been selected for the patient.                Community & DME    No services have been selected for the patient.                    Selected Continued Care - Prior Encounters Includes continued care and service providers with selected services from prior encounters from 7/28/2023 to 11/6/2023      Discharged on 8/24/2023 Admission date: 8/19/2023 - Discharge disposition: Skilled Nursing Facility (DC - External)      Destination       Service Provider Selected Services Address Phone Fax Patient Preferred    [facility] [address] [facility] [address] [phone] --                               Final Discharge Disposition Code: 62 - inpatient rehab facility

## 2023-11-06 NOTE — DISCHARGE SUMMARY
Patient Name: Flako Coreas  : 1965  MRN: [id number]    Date of Admission: 10/26/2023  Date of Discharge:  2023  Primary Care Physician: Akash Rodriguez Jr.,       Chief Complaint:   Dizziness and Head Injury      Discharge Diagnoses     Active Hospital Problems    Diagnosis  POA    **Vertigo [R42]  Yes    Alcohol abuse [F10.10]  Yes    Concussion [S06.0XAA]  Yes    Fall [W19.XXXA]  Yes    Head injury, closed, with concussion [S06.0XAA]  Yes    HTN (hypertension) [I10]  Yes    CVA (cerebral vascular accident) [I63.9]  Yes    Mixed hyperlipidemia [E78.2]  Yes      Resolved Hospital Problems    Diagnosis Date Resolved POA    TIA (transient ischemic attack) [G45.9] 10/27/2023 Yes        Hospital Course     Mr. Coreas is a 58 y.o. male with a history of HTN, HLD, GERD, stroke, alcohol abuse who presented to [facility] initially complaining of dizziness, multiple falls.  Please see the admitting history and physical for further details.  Sustained closed head injury, likely concussion from fall. Neurology has followed. MRI brain was negative for new stroke. Has continued on statin, ASA, brilinta. He had minimal rectal bleeding, GI evaluated. Hgb remained stable. Plan is to wait 3 months when he can come off brilinta to undergo poss colonoscopy. No further bleeding reported. BP has been acceptable on current regimen. CIWA protocol was followed; no evidence of withdrawal currently, continue vitamin repletion. OT/PT have evaluated recommending acute BAR. Stable from medical standpoint for transfer.   Day of Discharge     Subjective:  Feels well. No new issues. Agrees with discharge to acute rehab    Physical Exam:  Temp:  [97 °F (36.1 °C)-99 °F (37.2 °C)] 98.8 °F (37.1 °C)  Heart Rate:  [63-81] 71  Resp:  [16] 16  BP: (131-150)/(73-85) 150/85  Body mass index is 29.66 kg/m².  Physical Exam  Vitals and nursing note reviewed.   Constitutional:       General: He is not in acute  distress.  HENT:      Head: Normocephalic.      Mouth/Throat:      Mouth: Mucous membranes are moist.   Eyes:      Conjunctiva/sclera: Conjunctivae normal.   Cardiovascular:      Rate and Rhythm: Normal rate and regular rhythm.   Pulmonary:      Effort: Pulmonary effort is normal. No respiratory distress.   Abdominal:      General: Bowel sounds are normal.      Palpations: Abdomen is soft.   Musculoskeletal:      Cervical back: Neck supple.      Right lower leg: No edema.      Left lower leg: No edema.   Skin:     General: Skin is warm and dry.   Neurological:      Mental Status: He is alert and oriented to person, place, and time.   Psychiatric:         Mood and Affect: Mood normal.         Behavior: Behavior normal.         Consultants     Consult Orders (all) (From admission, onward)       Start     Ordered    10/30/23 1215  Inpatient Rehab Admission Consult  Once        Provider:  (Not yet assigned)    10/30/23 1215    10/29/23 1432  Inpatient Cardiology Consult  Once,   Status:  Canceled        Specialty:  Cardiology  Provider:  Mak Roman MD    10/29/23 1432    10/29/23 1425  Inpatient Cardiology Consult  Once,   Status:  Canceled        Specialty:  Cardiology  Provider:  (Not yet assigned)    10/29/23 1425    10/28/23 0935  Inpatient Gastroenterology Consult  Once        Specialty:  Gastroenterology  Provider:  Margo Youssef MD    10/28/23 0935    10/27/23 1612  Inpatient Internal Medicine Consult  Once        Specialty:  Hospitalist  Provider:  Xena Cooper MD    10/27/23 1611    10/26/23 2020  Inpatient Neurology Consult Stroke  Once        Specialty:  Neurology  Provider:  Vinita Boss MD    10/26/23 2020    10/26/23 2020  Inpatient Neurology Consult Stroke  Once        Specialty:  Neurology  Provider:  Vinita Boss MD    10/26/23 2020                  Procedures     * Surgery not found *    Imaging Results (All)       Procedure Component Value Units Date/Time    MRI  Brain Without Contrast [[id number]] Collected: 10/27/23 0058     Updated: 10/27/23 0058    Narrative:        Patient: NIMESH ROCHA  Time Out: 00:57  Exam(s): MRI HEAD Without Contrast     EXAM:    MR Head Without Intravenous Contrast    CLINICAL HISTORY:     Reason for exam: Dizziness and fall and recently had a acute infarct.    TECHNIQUE:    Magnetic resonance images of the head brain without intravenous   contrast in multiple planes.    COMPARISON:    MRI brain from October 23, 2023 as well as CT angiogram and CT   perfusion from that date.  Prior MRI brain from September 2, 2023.    FINDINGS:    Brain:  5 mm focus of T2 shine through on the DWI images in the left   cerebellum consistent with old lacunar infarct is less prominent than on   the comparison study.  No acute infarcts are identified.  Mild patchy   periventricular and deep matter T2 hyperintensities consistent with   chronic small vessel disease.  There is no mass lesion or midline shift.    No hemorrhage.    Ventricles:  Unremarkable.  No ventriculomegaly.    Bones joints:  Unremarkable.    Sinuses:  Trace amount of mucosal thickening in the left maxillary   sinus and ethmoid air cells.  No gas fluid levels are seen.    Mastoid air cells:  Unremarkable as visualized.  No mastoid effusion.    Orbits:  Unremarkable as visualized.    IMPRESSION:         5 mm focus of T2 shine through on the DWI images in the left cerebellum   consistent with old lacunar infarct is less prominent than on the   comparison study.  No acute infarcts are identified.  Mild scattered T2   hyperintensities consistent with chronic small vessel disease, unchanged.      Impression:          Electronically signed by Antonio More MD on 10-27-23 at 0057    CT Head Without Contrast Stroke Protocol [[id number]] Collected: 10/26/23 2119     Updated: 10/26/23 2125    Narrative:      HEAD CT     HISTORY: Fall with a laceration to the forehead.     TECHNIQUE: Noncontrast head CT is  provided. Comparison: Head CT August 21, 2023.     FINDINGS: The ventricles are normal in caliber. The brain parenchyma and  extra-axial spaces appear normal. The recently demonstrated subacute  left cerebellar infarction seen on MRI is not evident on CT. There is no  hemorrhage or acute ischemic change. The bones of the skull appear  normal. The mastoid air cells, middle ears, and visualized paranasal  sinuses are clear.     There is wet bandage material over the anterior frontal scalp compatible  with the reported history of laceration. No soft tissue gas or  radiopaque foreign body is present, however.       Impression:      Negative head CT.     Radiation dose reduction techniques were utilized, including automated  exposure control and exposure modulation based on body size.        This report was finalized on 10/26/2023 9:22 PM by Dr. Pk Oropeza M.D on Workstation: [id number]       XR Chest 1 View [[id number]] Collected: 10/26/23 2110     Updated: 10/26/23 2114    Narrative:      PORTABLE CHEST X-RAY     HISTORY: Dizziness, acute stroke protocol.     Portable chest x-ray is provided. Correlation: October 23, 2023.     FINDINGS: The cardiomediastinal silhouette is normal. The lungs are  clear. The costophrenic sulci are dry and the bones appear normal. There  is no pneumothorax.       Impression:      Negative.     This report was finalized on 10/26/2023 9:11 PM by Dr. Pk Oropeza M.D on Workstation: [id number]             Results for orders placed during the hospital encounter of 12/08/22    Duplex Renal Artery - Bilateral Complete CAR    Interpretation Summary    Normal right renal artery.    Normal left renal artery.    Results for orders placed during the hospital encounter of 08/19/23    Adult Transesophageal Echo (MEGHANN) W/ Cont if Necessary Per Protocol    Interpretation Summary    Left ventricular systolic function is hyperdynamic (EF > 70%).    Left ventricular wall thickness is consistent with  "mild to moderate concentric hypertrophy.    Left ventricular diastolic function is consistent with (grade I) impaired relaxation.    Normal right ventricular cavity size and systolic function noted.    The left atrial cavity is mildly dilated.    No evidence of a left atrial appendage thrombus was present    No evidence of a patent foramen ovale. Saline test results are negative.    There are very mild plaques in the distal descending thoracic aorta. There are no plaques in the aortic arch or ascending aorta    There is no evidence of pericardial effusion    Pertinent Labs     Results from last 7 days   Lab Units 11/06/23 0607 11/05/23 0438 11/04/23 0615 11/03/23  0643   WBC 10*3/mm3 6.40 6.27 7.32 7.10   HEMOGLOBIN g/dL 11.5* 11.9* 12.1* 12.7*   PLATELETS 10*3/mm3 223 238 242 231     Results from last 7 days   Lab Units 11/06/23 0607 11/05/23 0438 11/04/23 0615 11/03/23 0643   SODIUM mmol/L 141 143 145 143   POTASSIUM mmol/L 3.5 3.6 3.6 3.7   CHLORIDE mmol/L 106 107 109* 109*   CO2 mmol/L 26.8 26.0 24.6 24.6   BUN mg/dL 12 14 13 14   CREATININE mg/dL 0.99 1.07 0.95 0.93   GLUCOSE mg/dL 96 101* 99 100*   EGFR mL/min/1.73 89.4 81.4 93.9 96.4     Results from last 7 days   Lab Units 11/06/23  0607   ALBUMIN g/dL 3.8   BILIRUBIN mg/dL 0.4   ALK PHOS U/L 61   AST (SGOT) U/L 13   ALT (SGPT) U/L 21     Results from last 7 days   Lab Units 11/06/23 0607 11/05/23 0438 11/04/23 0615 11/03/23  0643   CALCIUM mg/dL 8.7 8.8 9.0 9.3   ALBUMIN g/dL 3.8  --   --   --    MAGNESIUM mg/dL  --   --  1.8  --                Invalid input(s): \"LDLCALC\"          Test Results Pending at Discharge       Discharge Details        Discharge Medications        ASK your doctor about these medications        Instructions Start Date   amLODIPine 10 MG tablet  Commonly known as: NORVASC   5 mg, Oral, Daily      aspirin 81 MG EC tablet   81 mg, Oral, Daily      atorvastatin 80 MG tablet  Commonly known as: LIPITOR   80 mg, Oral, Nightly    "   Brilinta 60 MG tablet tablet  Generic drug: ticagrelor   60 mg, Oral, 2 Times Daily      carvedilol 6.25 MG tablet  Commonly known as: COREG   6.25 mg, Oral, 2 Times Daily With Meals      hydrALAZINE 100 MG tablet  Commonly known as: APRESOLINE   100 mg, Oral, Every 8 Hours Scheduled      hydroCHLOROthiazide 12.5 MG tablet  Commonly known as: HYDRODIURIL   12.5 mg, Oral, Daily      hydrOXYzine 25 MG tablet  Commonly known as: ATARAX   25 mg, Oral, 3 Times Daily PRN      lisinopril 40 MG tablet  Commonly known as: PRINIVIL,ZESTRIL   40 mg, Oral, Daily      meclizine 25 MG tablet  Commonly known as: ANTIVERT       spironolactone 25 MG tablet  Commonly known as: ALDACTONE   25 mg, Oral, Daily      vilazodone 40 MG tablet tablet  Commonly known as: Viibryd   40 mg, Oral, Daily               No Known Allergies    Discharge Disposition:  Rehab Facility or Unit ([facility])      Discharge Diet:  Diet Order   Procedures    Diet: Cardiac Diets; Healthy Heart (2-3 Na+); Texture: Regular Texture (IDDSI 7); Fluid Consistency: Thin (IDDSI 0)       Discharge Activity:       CODE STATUS:    Code Status and Medical Interventions:   Ordered at: 10/26/23 2136     Level Of Support Discussed With:    Patient     Code Status (Patient has no pulse and is not breathing):    CPR (Attempt to Resuscitate)     Medical Interventions (Patient has pulse or is breathing):    Full Support       Future Appointments   Date Time Provider Department Center   11/21/2023  8:00 AM Sravanthi Mcmahon APRN [facility]   12/22/2023 10:15 AM Chinmay Taylor MD [facility]      Contact information for follow-up providers       Akash Rodriguez Jr., DO Follow up in 1 week(s).    Specialties: Sports Medicine, Family Medicine, Emergency Medicine  Why: hospital follow up 1 week after discharge  Contact information:  [address] [address] [phone]               Sravanthi Mcmahon APRN Follow up  on 11/21/2023.    Specialties: Nurse Practitioner, Neurology  Contact information:  [address]  [facility] [address]  [place] [address]  [phone]               Chinmay Taylor MD Follow up on 12/22/2023.    Specialty: Cardiology  Contact information:  [address]  [facility] [address]  [place] [address]  [phone]               Chani Springer MD Follow up.    Specialty: Gastroenterology  Why: As needed  Contact information:  [address] [facility] [address]  [place] [address]  [place] [address]  [phone]                       Contact information for after-discharge care       Destination       [facility] .    Service: Inpatient Rehabilitation  Contact information:  [address] [facility] [address] [place] [address]  [phone]                                   Time Spent on Discharge:  Greater than 30 minutes      JANEEN Fernández  [facility]  11/06/23  13:27 EST

## 2023-11-06 NOTE — PROGRESS NOTES
SECTION GG    Eating Performance: Silex sets up or cleans up; patient completes activity.  Silex assists only prior to or following the activity.    Eating Discharge Goals: Branch    Section B. Hearing and Vision  Ability to Hear:  Adequate - no difficulty in normal conversation, social  interaction, listening to TV  Ability to See in Adequate Light:  Adequate - sees fine detail, such as regular  print in newspapers/books    Section B. Health Literacy  Frequency of Needing Assistance Reading:  Sometimes    Section D. Mood  Presence of little interest or pleasure in doing things:   Yes  Frequency of having little interest or pleasure in doing things:   Never or 1  day  Presence of feeling down, depressed, or hopeless:   No  Frequency of feeling down, depressed, or hopeless:   Never or 1 day   Interview Ended. Above responses do not meet criteria to continue.  Total Severity Score:   0    Section D. Social Isolation  Frequ[place] of Feeling Lonely or Isolated:  Rarely    Section J. Health Conditions (Pain Effect on Sleep)  Pain Effect on Sleep:   Rarely or not at all    Signed by: Ashley Rollins RN

## 2023-11-07 PROCEDURE — 97530 THERAPEUTIC ACTIVITIES: CPT

## 2023-11-07 PROCEDURE — 97166 OT EVAL MOD COMPLEX 45 MIN: CPT

## 2023-11-07 PROCEDURE — 97116 GAIT TRAINING THERAPY: CPT

## 2023-11-07 PROCEDURE — 97535 SELF CARE MNGMENT TRAINING: CPT

## 2023-11-07 PROCEDURE — 97161 PT EVAL LOW COMPLEX 20 MIN: CPT

## 2023-11-07 PROCEDURE — 96125 COGNITIVE TEST BY HC PRO: CPT

## 2023-11-07 RX ADMIN — HYDROXYZINE HYDROCHLORIDE 50 MG: 25 TABLET ORAL at 15:21

## 2023-11-07 RX ADMIN — HYDRALAZINE HYDROCHLORIDE 100 MG: 50 TABLET, FILM COATED ORAL at 05:18

## 2023-11-07 RX ADMIN — CARVEDILOL 6.25 MG: 6.25 TABLET, FILM COATED ORAL at 16:53

## 2023-11-07 RX ADMIN — FOLIC ACID 1 MG: 1 TABLET ORAL at 08:18

## 2023-11-07 RX ADMIN — PANTOPRAZOLE SODIUM 40 MG: 40 TABLET, DELAYED RELEASE ORAL at 05:18

## 2023-11-07 RX ADMIN — Medication 10 MG: at 20:06

## 2023-11-07 RX ADMIN — VILAZODONE HYDROCHLORIDE 40 MG: 40 TABLET, FILM COATED ORAL at 08:19

## 2023-11-07 RX ADMIN — HYDRALAZINE HYDROCHLORIDE 100 MG: 50 TABLET, FILM COATED ORAL at 22:27

## 2023-11-07 RX ADMIN — Medication 100 MG: at 08:19

## 2023-11-07 RX ADMIN — CARVEDILOL 6.25 MG: 6.25 TABLET, FILM COATED ORAL at 08:18

## 2023-11-07 RX ADMIN — ASPIRIN 81 MG: 81 TABLET, COATED ORAL at 08:18

## 2023-11-07 RX ADMIN — ATORVASTATIN CALCIUM 80 MG: 80 TABLET, FILM COATED ORAL at 20:06

## 2023-11-07 RX ADMIN — MULTIPLE VITAMINS W/ MINERALS TAB 1 TABLET: TAB at 08:19

## 2023-11-07 RX ADMIN — DOCUSATE SODIUM 50MG AND SENNOSIDES 8.6MG 2 TABLET: 8.6; 5 TABLET, FILM COATED ORAL at 08:19

## 2023-11-07 RX ADMIN — AMLODIPINE BESYLATE 5 MG: 5 TABLET ORAL at 08:18

## 2023-11-07 RX ADMIN — TICAGRELOR 60 MG: 60 TABLET ORAL at 08:19

## 2023-11-07 RX ADMIN — TICAGRELOR 60 MG: 60 TABLET ORAL at 20:06

## 2023-11-07 RX ADMIN — HYDRALAZINE HYDROCHLORIDE 100 MG: 50 TABLET, FILM COATED ORAL at 13:26

## 2023-11-07 RX ADMIN — LISINOPRIL 40 MG: 40 TABLET ORAL at 08:18

## 2023-11-07 NOTE — PLAN OF CARE
Goal Outcome Evaluation:      Slept well. Meds with water. Continent of B&B. Turns self. Not wearing SCD's, but on Brilinta. No complaints.

## 2023-11-07 NOTE — THERAPY EVALUATION
Inpatient Rehabilitation - Physical Therapy Initial Evaluation       [facility]     Patient Name: Flako Coreas  : 1965  MRN: [id number]    Today's Date: 2023                    Admit Date: 2023      Visit Dx:   No diagnosis found.    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    Acute CVA (cerebrovascular accident)    HTN (hypertension)    Late effect of cerebrovascular accident (CVA)    Concussion    Fall    Head injury, closed, with concussion    Alcohol abuse       Past Medical History:   Diagnosis Date    ADHD (attention deficit hyperactivity disorder)     On going issue    Anxiety     Lepe's esophagus     Benign prostatic hyperplasia Surgery in 2021    Clotting disorder Intestinal    Depression     Diverticulitis     Diverticulosis     Duodenitis     Enteritis     Failure to thrive (child)     Family history of blood clots     GERD (gastroesophageal reflux disease)     Hyperlipidemia     Hypertension     Hypertensive emergency     Low testosterone     Microcytosis     Pancreatitis     Pneumonia     PONV (postoperative nausea and vomiting)     Seasonal affective disorder     Shoulder injury     Stroke     Unexplained weight loss        Past Surgical History:   Procedure Laterality Date    COLON SURGERY      COLONOSCOPY      COLONOSCOPY N/A 2022    Procedure: COLONOSCOPY INTO CECUM WITH HOT SNARE POLYPECTOMY;  Surgeon: Albert Gallo MD;  Location: [facility] ENDOSCOPY;  Service: Gastroenterology;  Laterality: N/A;  PRE- GI BLEED  POST- DIVERTICULOSIS, POLYP    ENDOSCOPY N/A  12/10/2022    Procedure: ESOPHAGOGASTRODUODENOSCOPY;  Surgeon: Albert Gallo MD;  Location: [facility] ENDOSCOPY;  Service: Gastroenterology;  Laterality: N/A;  PRE- DARK STOOLS  POST- BARRETTS ESOPHAGUS    FRACTURE SURGERY  1980    for broken arm    FRACTURE SURGERY      for broken hand    INCISION AND DRAINAGE ABSCESS  2015    anal    PROSTATE SURGERY      SMALL INTESTINE SURGERY      TONSILLECTOMY         PT ASSESSMENT (last 12 hours)       IRF PT Evaluation and Treatment       Row Name 11/07/23 1019          PT Time and Intention    Document Type initial evaluation  -MD     Mode of Treatment physical therapy  -MD     Patient/Family/Caregiver Comments/Observations Pt sitting in WC showing no signs of acute distress.  -MD       Row Name 11/07/23 1019          General Information    Existing Precautions/Restrictions fall  -MD       Row Name 11/07/23 1019          Living Environment    Home Accessibility stairs to enter home  -MD       Row Name 11/07/23 1019          Home Main Entrance    Number of Stairs, Main Entrance twelve  x2  -MD     Stair Railings, Main Entrance railings safe and in good condition;railings on both sides of stairs  -MD       Row Name 11/07/23 1019          Pain Assessment    Pretreatment Pain Rating 0/10 - no pain  -MD       Row Name 11/07/23 1019          Cognition/Psychosocial    Orientation Status (Cognition) oriented x 3  -MD     Follows Commands (Cognition) follows one-step commands;75-90% accuracy  -MD     Personal Safety Interventions fall prevention program maintained;gait belt  -MD       Row Name 11/07/23 1019          Range of Motion Comprehensive    General Range of Motion bilateral lower extremity ROM WFL  -MD       Row Name 11/07/23 1019          Strength (Manual Muscle Testing)    Strength (Manual Muscle Testing) bilateral lower extremities  -MD       Row Name 11/07/23 1019          Strength Comprehensive (MMT)    General Manual Muscle Testing (MMT) Assessment lower extremity  strength deficits identified  -MD       Row Name 11/07/23 1019          Lower Extremity (Manual Muscle Testing)    Lower Extremity: Manual Muscle Testing (MMT) right LE strength is WFL;left hip strength deficit;left knee strength deficit;left ankle strength deficit  -MD       Row Name 11/07/23 1019          Left Hip (Manual Muscle Testing)    Left Hip Manual Muscle Testing (MMT) flexion  -MD     MMT: Flexion, Left Hip flexion  -MD     MMT, Gross Movement: Left Hip Flexion (4/5) good  -MD       Row Name 11/07/23 1019          Left Knee (Manual Muscle Testing)    Left Knee Manual Muscle Testing (MMT) extension;flexion  -MD     MMT: Extension, Left Knee extension  -MD     MMT, Gross Movement: Left Knee Extension (4-/5) good minus  -MD     MMT: Flexion, Left Knee flexion  -MD     MMT, Gross Movement: Left Knee Flexion (4/5) good  -MD       Row Name 11/07/23 1019          Left Ankle/Foot (Manual Muscle Testing)    Left Ankle Manual Muscle Testing (MMT) dorsiflexion  -MD     MMT: Dorsiflexion Left Ankle Muscles dorsiflexion  -MD     MMT, Gross Movement: Left Ankle Dorsiflexion (3/5) fair  -MD       Row Name 11/07/23 1019          Vision Assessment/Intervention    Vision Assessment Comment pt reports double vision  -MD       Row Name 11/07/23 1019          Mobility    Advanced Gait Activity curb negotiation;rough/uneven surfaces  -MD     Additional Documentation Advanced Gait Activity (Row)  -MD       Row Name 11/07/23 1019          Bed Mobility    Bed Mobility rolling left;rolling right;supine-sit;sit-supine  -MD     Rolling Left [place] (Bed Mobility) supervision  -MD     Rolling Right [place] (Bed Mobility) supervision  -MD     Supine-Sit [place] (Bed Mobility) supervision  -MD     Sit-Supine [place] (Bed Mobility) supervision  -MD       Row Name 11/07/23 1019          Transfer Assessment/Treatment    Transfers bed-chair transfer;chair-bed transfer;sit-stand transfer;stand-sit transfer;car transfer   -MD       Row Name 11/07/23 1019          Bed-Chair Transfer    Bed-Chair [place] (Transfers) contact guard  -MD     Assistive Device (Bed-Chair Transfers) walker, front-wheeled  -MD       Row Name 11/07/23 1019          Chair-Bed Transfer    Chair-Bed [place] (Transfers) contact guard  -MD     Assistive Device (Chair-Bed Transfers) walker, front-wheeled  -MD       Row Name 11/07/23 1019          Sit-Stand Transfer    Sit-Stand [place] (Transfers) contact guard  -MD     Assistive Device (Sit-Stand Transfers) walker, front-wheeled  -MD       Row Name 11/07/23 1019          Stand-Sit Transfer    Stand-Sit [place] (Transfers) contact guard  -MD     Assistive Device (Stand-Sit Transfers) walker, front-wheeled  -MD       Row Name 11/07/23 1019          Car Transfer    Type (Car Transfer) stand pivot/stand step  -MD     [place] Level (Car Transfer) contact guard  -MD     Assistive Device (Car Transfer) walker, front-wheeled  -MD       Row Name 11/07/23 1019          Gait/Stairs (Locomotion)    [place] Level (Gait) verbal cues;contact guard  -MD     Assistive Device (Gait) walker, front-wheeled  -MD     Distance in Feet (Gait) 160'  -MD     Deviations/Abnormal Patterns (Gait) gait speed decreased  very deliberate steps.  VC to take shorter steps  -MD     Bilateral Gait Deviations forward flexed posture  -MD     Left Sided Gait Deviations foot drop/toe drag;heel strike decreased  -MD     [place] Level (Stairs) verbal cues;contact guard  -MD     Handrail Location (Stairs) right side (ascending);right side (descending)  -MD     Number of Steps (Stairs) 4  -MD     Ascending Technique (Stairs) step-over-step  -MD     Descending Technique (Stairs) step-over-step  -MD       Row Name 11/07/23 1019          Curb Negotiation (Mobility)    [place], Curb Negotiation verbal cues;contact guard  -MD     Assistive Device (Curb Negotiation) walker, front-wheeled  -MD       Row Name 11/07/23 1019           Positioning and Restraints    Pre-Treatment Position sitting in chair/recliner  -MD     Post Treatment Position bed  -MD     In Bed supine;call light within reach;exit alarm on  -MD       Row Name 11/07/23 1019          Therapy Assessment/Plan (PT)    Functional Level at Time of Evaluation (PT) CGA-Min A  -MD     PT Diagnosis (PT) impaired gait and impaired dynamic standing balance  -MD     Rehab Potential/Prognosis (PT) good, to achieve stated therapy goals  -MD     Frequency of Treatment (PT) 5 times per week;60 minutes per session  -MD     Estimated Duration of Therapy (PT) 2 weeks  -MD     Comment, Therapy Assessment/Plan (PT) Pt is 57 yo male presenting s/p CVA, CHI and concussion.  Prior to events pt was independent w mobility and not using AD.  At time of PT eval pt required Min A x1 for safety w functional mobility as well as use of RWx.  Pt will benifit from skilled PT to address mobility deficits and increase safety and independence w functional mobility.  -MD       Row Name 11/07/23 1019          Therapy Plan Review/Discharge Plan (PT)    Anticipated Equipment Needs at Discharge (PT Eval) front wheeled walker  -MD     Anticipated Discharge Disposition (PT) home with outpatient therapy services  -MD       Row Name 11/07/23 1019          IRF PT Goals    Bed Mobility Goal Selection (PT-IRF) bed mobility, PT goal 1  -MD     Transfer Goal Selection (PT-IRF) transfers, PT goal 1  -MD     Gait (Walking Locomotion) Goal Selection (PT-IRF) gait, PT goal 1  -MD     Stairs Goal Selection (PT-IRF) stairs, PT goal 1  -MD       Row Name 11/07/23 1019          Bed Mobility Goal 1 (PT-IRF)    Activity/Assistive Device (Bed Mobility Goal 1, PT-IRF) sit to supine/supine to sit  -MD     [place] Level (Bed Mobility Goal 1, PT-IRF) independent  -MD     Time Frame (Bed Mobility Goal 1, PT-IRF) 2 weeks  -MD       Row Name 11/07/23 1019          Transfer Goal 1 (PT-IRF)    Activity/Assistive Device (Transfer Goal 1,  PT-IRF) sit-to-stand/stand-to-sit;bed-to-chair/chair-to-bed;car transfer;walker, rolling  -MD     [place] Level (Transfer Goal 1, PT-IRF) modified independence  -MD     Time Frame (Transfer Goal 1, PT-IRF) 2 weeks  -MD       Row Name 11/07/23 1019          Gait/Walking Locomotion Goal 1 (PT-IRF)    Activity/Assistive Device (Gait/Walking Locomotion Goal 1, PT-IRF) gait (walking locomotion);walker, rolling  -MD     Gait/Walking Locomotion Distance Goal 1 (PT-IRF) 160'  -MD     [place] Level (Gait/Walking Locomotion Goal 1, PT-IRF) supervision required  -MD     Time Frame (Gait/Walking Locomotion Goal 1, PT-IRF) 2 weeks  -MD       Row Name 11/07/23 1019          Stairs Goal 1 (PT-IRF)    Activity/Assistive Device (Stairs Goal 1, PT-IRF) stairs, all skills;using handrail, right  -MD     Number of Stairs (Stairs Goal 1, PT-IRF) 12  -MD     [place] Level (Stairs Goal 1, PT-IRF) supervision required  -MD     Time Frame (Stairs Goal 1, PT-IRF) 2 weeks  -MD               User Key  (r) = Recorded By, (t) = Taken By, (c) = Cosigned By      Initials Name Provider Type    Trinity Dash, PT Physical Therapist                     Physical Therapy Education       Title: PT OT SLP Therapies (In Progress)       Topic: Physical Therapy (In Progress)       Point: Mobility training (Not Started)       Learner Progress:  Not documented in this visit.              Point: Home exercise program (Not Started)       Learner Progress:  Not documented in this visit.              Point: Body mechanics (Not Started)       Learner Progress:  Not documented in this visit.              Point: Precautions (Done)       Learning Progress Summary             Patient Acceptance, E, VU by MD at 11/7/2023 1605                                         User Key       Initials Effective Dates Name Provider Type Edith JOHNSTON 06/16/21 -  Trinity Velez, PT Physical Therapist PT                    PT Recommendation and Plan    Frequency of  Treatment (PT): 5 times per week, 60 minutes per session  Anticipated Equipment Needs at Discharge (PT Eval): front wheeled walker                  Time Calculation:      PT Charges       Row Name 11/07/23 1350 11/07/23 1018          Time Calculation    Start Time 1330  -MD 1000  -MD     Stop Time 1400  -MD 1030  -MD     Time Calculation (min) 30 min  -MD 30 min  -MD     PT Received On -- 11/07/23  -MD     PT - Next Appointment -- 11/08/23  -MD     PT Goal Re-Cert Due Date -- 11/14/23  -MD               User Key  (r) = Recorded By, (t) = Taken By, (c) = Cosigned By      Initials Name Provider Type    Trinity Dash, PT Physical Therapist                    Therapy Charges for Today       Code Description Service Date Service Provider Modifiers Qty    64861829464 HC PT EVAL LOW COMPLEXITY 3 11/7/2023 Trinity Velez, PT GP 1    37840084173 HC PT THERAPEUTIC ACT EA 15 MIN 11/7/2023 Trinity Velez, PT GP 1    15920354870 HC GAIT TRAINING EA 15 MIN 11/7/2023 Trinity Velez, PT GP 2              PT G-Codes  AM-PAC 6 Clicks Score (PT): 23      Trinity Velez, PT  11/7/2023

## 2023-11-07 NOTE — PROGRESS NOTES
Recreational Therapy Note    Patient Name: Flako Coreas   MRN: [id number]    Therapeutic Recreation Eval and Treat (last 12 hours)       Therapeutic Recreation Eval & Treat       Row Name 11/07/23 1422       Pertinent Diagnosis/Presenting Problems    Presenting Problems/Reason for Referral Concussion  -TW      Row Name 11/07/23 1422       Lifestyle/Recreational History/Interest    Profession/Job preivously a director at [employer]  -TW    Current Living Situation alone  -TW    Transportation Situation car, drives self  -TW      Row Name 11/07/23 1422       Recreational History and Interests    How Important is Recreation to You? high importance  -TW    Satisfied With Leisure Lifestyle? yes  -TW    Participate Regularly in Leisure Activity? yes  -TW    Are You a Social Person? yes  -TW    Current Hobbies/Interests outdoor activities;sports/team sports;cooking/baking  -TW    Outdoor Activities fishing;hiking  -TW      Row Name 11/07/23 1422       Barriers To Leisure Activity    Barriers to Leisure Activity mobility limitations  -TW    Mobility Limitations (Barriers to Leisure) balance limitations;strength limitations;endurance limitations  -TW      Row Name 11/07/23 1422       Coping/Wellbeing    Current State of Wellbeing calm;positive attitude  -TW    Factors Impacting Current State of Wellbeing no significant issues  -TW      Row Name 11/07/23 1422       Therapeutic Recreation Participation    Orientation to Therapeutic Recreation patient;received explanation of recreation therapy programs;completed therapeutic recreation assessment  -TW      Row Name 11/07/23 1422       Therapeutic Recreation Assessment/Plan    Recreation Therapy Goals/Objectives improve;functional leisure skills;leisure awareness;leisure participation  -TW    Recreation Plan leisure exploration;structured leisure participation  -TW              User Key  (r) = Recorded By, (t) = Taken By, (c) = Cosigned By      Initials Name Provider  Type    TW Olya Suarez, ALEXISS Recreational Therapist                      POOJA Rice  11/7/2023

## 2023-11-07 NOTE — PROGRESS NOTES
Discharge Planning Assessment  [facility]     Patient Name: Flako Coreas  MRN: [id number]  Today's Date: 11/7/2023    Admit Date: 11/6/2023    Plan: Patient plans to d/c home. Patient's adult son will be staying with patient and can assist 24/7.   Discharge Needs Assessment    No documentation.                  Discharge Plan       Row Name 11/07/23 1549       Plan    Plan Patient plans to d/c home. Patient's adult son will be staying with patient and can assist 24/7.    Patient/Family in Agreement with Plan yes    Plan Comments Met with patient to complete assessment. Also, spoke with patient's son on the phone. Patient lives alone in a second floor apartment with 20 steps with a rail to enter. Patient had a stroke back in August and went to [facility] for rehab but only stayed a short time due to the poor conditions, according to patient, and did not start rehab. He never had outpatient or HH after that stroke. Patient uses a walker when ambulating in his apartment. He uses a walking stick when out in the community and he also has a shower chair. Patient's adult son is able to get FMLA through his job and will be staying with patient at his apartment 24/7 at d/c to assist. Patient has recently applied for disability and is waiting to hear back on the decision. He has not been able to find a job due to effects from his last stroke and has anxiety about paying rent and other bills at this time. SW explained her role on the unit and team/family conference. SW will follow up with patient and patient's son on Thursday after team conference.                  Continued Care and Services - Admitted Since 11/6/2023    Coordination has not been started for this encounter.       Selected Continued Care - Prior Encounters Includes continued care and service providers with selected services from prior encounters from 8/8/2023 to 11/7/2023      Discharged on 11/6/2023 Admission date: 10/26/2023 - Discharge disposition:  Rehab Facility or Unit ([facility])      Destination       Service Provider Selected Services Address Phone Fax Patient Preferred    [facility] Inpatient Rehabilitation [address] [facility] [address] [phone] -- --                      Discharged on 8/24/2023 Admission date: 8/19/2023 - Discharge disposition: Skilled Nursing Facility (DC - External)      Destination       Service Provider Selected Services Address Phone Fax Patient Preferred    [facility] [address] [facility] [address] [phone] --                             Demographic Summary    No documentation.                  Functional Status       Row Name 11/07/23 1534       Functional Status    Usual Activity Tolerance good    Current Activity Tolerance moderate       Functional Status, IADL    Medications independent    Meal Preparation independent    Housekeeping independent    Laundry independent    Shopping independent       Mental Status    General Appearance WDL WDL       Employment/    Employment Status unemployed    Current or Previous Occupation service industry    Employment/ Comments Previously employed at [employer]                   Psychosocial       Row Name 11/07/23 1538       Behavior WDL    Behavior WDL interactions    Interactions cooperative       Emotion Mood WDL    Emotion/Mood/Affect WDL emotion mood    Emotion/Mood anxious       Mental Health    Little Interest or Pleasure in Doing Things 0-->not at all    Feeling Down, Depressed or Hopeless 0-->not at all    Do you feel stress - tense, restless, nervous, or anxious, or unable to sleep at night because your mind is troubled all the time - these days? To some exte       Speech WDL    Speech WDL speech       Thought Process WDL    Thought Process WDL judgment and insight    Judgment and Insight insight appropriate to situation        Intellectual Performance WDL    Intellectual Performance WDL WDL       Coping/Stress    Major Change/Loss/Stressor medical condition/diagnosis;chemical dependency/abuse;employment concerns    Patient Personal Strengths motivated    Sources of Support adult child(temo)    Reaction to Health Status adjusting    Understanding of Condition and Treatment adequate understanding of medical condition       Developmental Stage (Eriksson's)    Developmental Stage Stage 7 (35-65 years/Middle Adulthood) Generativity vs. Stagnation       C-SSRS (Recent)    Q1 Wished to be Dead (Past Month) no    Q2 Suicidal Thoughts (Past Month) no    Q6 Suicide Behavior (Lifetime) no       Violence Risk    Feels Like Hurting Others no    Previous Attempt to Harm Others no                   Abuse/Neglect       Row Name 11/07/23 1542       Personal Safety    Feels Unsafe at Home or Work/School no    Feels Threatened by Someone no    Does Anyone Try to Keep You From Having Contact with Others or Doing Things Outside Your Home? no    Physical Signs of Abuse Present no                   Legal    No documentation.                  Substance Abuse       Row Name 11/07/23 1542       AUDIT-C (Alcohol Use Disorders ID Test)    Q1: How often do you have a drink containing alcohol? 4 or more ti    Q2: How many drinks containing alcohol do you have on a typical day when you are drinking? 7 to 9    Q3: How often do you have six or more drinks on one occasion? Daily    Audit-C Score 11                   Patient Forms    No documentation.                     SANG Riley

## 2023-11-07 NOTE — PROGRESS NOTES
Case Management  Inpatient Rehabilitation Plan of Care and Discharge Plan Note    Rehabilitation Diagnosis:  Branch  Date of Onset:  Branch    Medical Summary:  Branch  Past Medical History: Branch    Plan of Care  Updated Problems/Interventions  Field    Expected Intensity:  Branch  Interdisciplinary Team:  Branch  Estimated Length of Stay/Anticipated Discharge Date: Branch  Anticipated Discharge Destination:  Anticipated discharge destination from inpatient rehabilitation is community  discharge with assistance. Home with 24/7 assistance of son as needed.      Based on the patient's medical and functional status, their prognosis and  expected level of functional improvement is:  Branch    Signed by: Elle Dukes, Social Work

## 2023-11-07 NOTE — PLAN OF CARE
Goal Outcome Evaluation:  Plan of Care Reviewed With: patient        Progress: improving  Outcome Evaluation: Pt. is A/Ox4, pleasant and cooperative. He states he really did well in therapy today. No unsafe behaviors today.Atarax 50 mg po given for anxiety today.

## 2023-11-07 NOTE — PROGRESS NOTES
" LOS: 1 day   Patient Care Team:  Akash Rodriguez Jr., DO as PCP - General (Sports Medicine)      NIMESH ROCHA  1965      ADMITTING DIAGNOSIS:    Status post concussion  History of CVA-aspirin/Brilinta/statin    Subjective   Tolerates initial therapies. Working on cognitive activities.  Has occasional diploplia -       Objective     Vitals:    11/07/23 1326   BP: 141/67   Pulse: 82   Resp: 18   Temp:    SpO2: 94%       Intake/Output Summary (Last 24 hours) at 11/7/2023 1756  Last data filed at 11/7/2023 1200  Gross per 24 hour   Intake 600 ml   Output 725 ml   Net -125 ml     PHYSICAL EXAM:      MENTAL STATUS -  AWAKE / ALERT  HEENT- NCAT, PUPILS EQUALLY ROUND, SCLERAE ANICTERIC, CONJUNCTIVAE PINK, OP MOIST, NO JVD, EARS UNREMARKABLE EXTERNALLY  LUNGS - CTA, NO WHEEZES, RALES OR RHONCHI  HEART- RRR, NO RUB, MURMUR, OR GALLOP  ABD - NORMOACTIVE BOWEL SOUNDS, SOFT, NT.    EXT - NO EDEMA OR CYANOSIS  NEURO -oriented x4  MOTOR EXAM - RUE/RLE 5/5. LUE/LLE 5/5.      MEDICATIONS  Scheduled Meds:amLODIPine, 5 mg, Oral, Daily  aspirin, 81 mg, Oral, Daily  atorvastatin, 80 mg, Oral, Nightly  carvedilol, 6.25 mg, Oral, BID With Meals  folic acid, 1 mg, Oral, Daily  hydrALAZINE, 100 mg, Oral, Q8H  lisinopril, 40 mg, Oral, Daily  melatonin, 10 mg, Oral, Nightly  multivitamin with minerals, 1 tablet, Oral, Daily  pantoprazole, 40 mg, Oral, Q AM  senna-docusate sodium, 2 tablet, Oral, BID  thiamine, 100 mg, Oral, Daily  ticagrelor, 60 mg, Oral, BID  vilazodone, 40 mg, Oral, Daily      Continuous Infusions:   PRN Meds:.  acetaminophen    senna-docusate sodium **AND** polyethylene glycol **AND** bisacodyl **AND** bisacodyl    hydrOXYzine      RESULTS  No results found for: \"POCGLU\"  Results from last 7 days   Lab Units 11/06/23  0607 11/05/23  0438 11/04/23  0615   WBC 10*3/mm3 6.40 6.27 7.32   HEMOGLOBIN g/dL 11.5* 11.9* 12.1*   HEMATOCRIT % 34.7* 35.8* 36.9*   PLATELETS 10*3/mm3 223 238 242     Results from last 7 " days   Lab Units 11/06/23  0607 11/05/23  0438 11/04/23  0615   SODIUM mmol/L 141 143 145   POTASSIUM mmol/L 3.5 3.6 3.6   CHLORIDE mmol/L 106 107 109*   CO2 mmol/L 26.8 26.0 24.6   BUN mg/dL 12 14 13   CREATININE mg/dL 0.99 1.07 0.95   CALCIUM mg/dL 8.7 8.8 9.0   BILIRUBIN mg/dL 0.4  --   --    ALK PHOS U/L 61  --   --    ALT (SGPT) U/L 21  --   --    AST (SGOT) U/L 13  --   --    GLUCOSE mg/dL 96 101* 99           ASSESSMENT and PLAN    Concussion    Status post concussion     History of CVA-aspirin/Brilinta/statin     Impaired mobility  Impaired self-care  Impaired cognition     Hypertension-amlodipine/carvedilol/hydralazine/lisinopril     Hyperlipidemia     Gastroesophageal reflux disease     Depression-Viibryd     DVT prophylaxis-on dual antiplatelet therapy.  SCDs     GI bleed-evaluated by gastroenterology-Plan is to wait 3 months before he can come off of Brilinta to undergo possible colonoscopy.        Goal is for home with home health   therapies.  Barrier to discharge:.  Mobility and self-care- work on strength balance transfers gait actives of daily living to overcome.          Pk Snow MD      Appropriate PPE was worn during the entire visit.  Hand hygiene was completed before and after.

## 2023-11-07 NOTE — PROGRESS NOTES
SECTION GG    Self Care Performance:   Oral Hygiene: Jefferson sets up or cleans up; patient completes activity. Jefferson  assists only prior to or following the activity.   Toileting Hygiene: Jefferson does less than half the effort. Jefferson lifts, holds  or supports trunk or limbs but provides less than half the effort.   Shower/Bathe Self: Jefferson provides verbal cues and/or touching/steadying and/or  contact guard assistance as patient completes activity.   Upper Body Dressing: Jefferson provides verbal cues and/or touching/steadying  and/or contact guard assistance as patient completes activity.   Lower Body Dressing: Jefferson provides verbal cues and/or touching/steadying  and/or contact guard assistance as patient completes activity.   Putting On/Taking Off Footwear: Jefferson does less than half the effort. Jefferson  lifts, holds or supports trunk or limbs but provides less than half the effort.    Self Care Discharge Goals:   Lower Body Dressing: Patient completed the activities by themself with no  assistance from a helper.    Mobility Toilet Transfer Performance: Jefferson does less than half the effort.  Jefferson lifts, holds or supports trunk or limbs but provides less than half the  effort.    Mobility Toilet Transfer Discharge Goal: Patient completed the activities by  themself with no assistance from a helper.    Signed by: Rosa Chris OTR/SAMMY

## 2023-11-07 NOTE — PROGRESS NOTES
Inpatient Rehabilitation Plan of Care Note    Plan of Care  Care Plan Reviewed - Updates as Follows    Safety    [RN] Potential for Injury(Active)  Current Status(11/07/2023): Uses call light appropriately  Weekly Goal(11/14/2023): Cues to use call light  Discharge Goal: Items within reach    Performed Intervention(s)  Items within reach  Safety rounds  Bed alarm/chair alarm      Psychosocial    [RN] Behavior(Active)  Current Status(11/07/2023): Anxity related to hx of strokes and fall  Weekly Goal(11/14/2023): Allow opportunity to express concers regarding current  status.  Discharge Goal: Demonstrate healthy oping strategies.    Performed Intervention(s)  Medication  verbalies needs and concerns      Sphincter Control    [RN] Bowel Management(Active)  Current Status(11/07/2023): continent 100%  Weekly Goal(11/14/2023): continet 100%  Discharge Goal: continent 100%    [RN] Bladder Management(Active)  Current Status(11/07/2023): continent 100%  Weekly Goal(11/14/2023): Continent 100%  Discharge Goal: Continent 100%    Performed Intervention(s)  Therapeutic exercises/modalities  Elimination schedule  Encourage appropriate diet    Signed by: Ange Kowalski RN

## 2023-11-07 NOTE — PROGRESS NOTES
Case Management  Inpatient Rehabilitation Plan of Care and Discharge Plan Note    Rehabilitation Diagnosis:  Status post concussion  History of CVA  Date of Onset:  10/23/2023    Medical Summary:  Patient is a 58-year-old male who admitted to [facility] on October 26, 2023.  He has a past medical history including but not limited to hypertension,  hyperlipidemia, GERD and recent CVA presented to [facility] with  dizziness and a fall.  Patient reported that he was getting out of the bathtub,  felt dizzy that he described as a spinning sensation.  Also, patient report  associated balance difficulty with coordination and diplopia.  Stated he fell  and hit his head on the floor.    Stated that he has been having symptoms since  his recent stroke back in August.  Reported left sided facial and left hand  numbness.  Stated that he is having difficulty with short-term memory.  Per chart review patient was admitted to the hospital from August 16 - August 24  and at that time was found to be hypertensive and had an acute CVA and was  placed on Plavix and discharged home.  Then again patient presented to the ED on  10/23/2023 complaining of left-sided facial numbness as well as some left hand  numbness and was admitted to the hospital.  Patient had a CTA of his head and  neck that showed no evidence of hemorrhagic conversion, there is delayed flow to  the inferior medial left cerebral hemisphere in the region of the infarction  without area of diminished cerebral blood flow.  These findings are similar to  the one on 8/19/2023.The left post PICA vertebral artery is very small. There is  atherosclerotic disease involving the intracranial segment right vertebral  artery and the basilar artery with mild to  moderate narrowing. Moderate  narrowing origin of the right vertebral artery.    Patient was felt to have sustained a closed head injury likely concussion from  fall.  MRI was negative for new  stroke.  Continue on statin, aspirin, Brilinta.  Had minimal rectal bleeding.  Evaluated by gastroenterology.  Hemoglobin  remained stable.  Plan is to wait 3 months before he can come off of Brilinta to  undergo possible colonoscopy.  No further bleeding reported.  Blood pressure has  been controlled.    He had impairments with his mobility and self-care  With Occupational Therapy on November 6-[He appears more off balanced today than  most recent OT encounter. Pt with difficulty maintain standing balance without  use of BUE. Pt completed mutliple dynamic balance activites today with OT  including, standing to reach for cups all directions, standing to fold various  clothing, functional mobility around room to reach for cups placed in dressors,  drawers, countertops. Pt requires min A when standing without UE support, he has  postural sway. He is very fearful of reaching/weight shifting when he has to put  increase weight on his LLE, he continues to drag LLE during mobility efforts. ]  With physical therapy on November 5-[able to increase ambulate distance to 80'  this date with Rwx. Cues for safety with gait including marching L LE and dec  step length as he tends to take long step on the L. Cues for erect posture at  times as pt leans forward to watch foot placement. ]    Given his functional impairments and comorbidities he is now admitted for acute  inpatient rehabilitation    He complains of difficulty organizing his thoughts and completing his thoughts.  Complains of decreased balance.  Does not describe any focal weakness.  Fatigue  continues to be an issue  Past Medical History: Past Medical History:  Diagnosis Date  ? ADHD (attention deficit hyperactivity disorder)    On going issue  ? Anxiety  ? Lepe's esophagus  ? Benign prostatic hyperplasia Surgery in 12/2021  ? Clotting disorder Intestinal  ? Depression  ? Diverticulitis  ? Diverticulosis  ? Duodenitis  ? Enteritis  ? Failure to thrive  (child)  ? Family history of blood clots  ? GERD (gastroesophageal reflux disease)  ? Hyperlipidemia  ? Hypertension  ? Hypertensive emergency  ? Low testosterone  ? Microcytosis  ? Pancreatitis  ? Pneumonia  ? PONV (postoperative nausea and vomiting)  ? Seasonal affective disorder  ? Shoulder injury  ? Stroke  ? Unexplained weight loss        Surgical History    Past Surgical History:  Procedure Laterality Date  ? COLON SURGERY  ? COLONOSCOPY  ? COLONOSCOPY N/A 12/11/2022    Procedure: COLONOSCOPY INTO CECUM WITH HOT SNARE POLYPECTOMY;  Surgeon:  Albert Gallo MD;  Location: [facility] ENDOSCOPY;  Service: Gastroenterology;  Laterality: N/A;  PRE- GI BLEED  POST- DIVERTICULOSIS, POLYP  ? ENDOSCOPY N/A 12/10/2022    Procedure: ESOPHAGOGASTRODUODENOSCOPY;  Surgeon: Albert Gallo MD;  Location: [facility] ENDOSCOPY;  Service: Gastroenterology;  Laterality: N/A;  PRE-  DARK STOOLS  POST- BARRETTS ESOPHAGUS  ? FRACTURE SURGERY   1980    for broken arm  ? FRACTURE SURGERY    for broken hand  ? INCISION AND DRAINAGE ABSCESS   2015    anal  ? PROSTATE SURGERY  ? SMALL INTESTINE SURGERY  ? TONSILLECTOMY    Plan of Care  Updated Problems/Interventions  Field    Expected Intensity:  Average of 3 hours of therapy 5 days/week.  Interdisciplinary Team:  Interdisciplinary Team: Medical Supervision and 24 Hour Rehabilitation Nursing.,  Physical Therapy:, Occupational Therapy:, Speech and Language Therapy:, Social  Work, Therapeutic Recreation., Psychology., Registered Dietitian.  Physical Therapy Intensity/Duration: 1 hour / day, 5 days / week, for  approximately 2 weeks  Occupational Therapy Intensity/Duration: 1 hour / day, 5 days / week, for  approximately 2 weeks  Speech Language Pathology  Intensity/Duration: 1 hour / day, 5 days / week, for  approximately 2 weeks  Estimated Length of Stay/Anticipated Discharge Date: 2 weeks  Anticipated Discharge Destination:  Anticipated discharge destination from inpatient rehabilitation is  community  discharge with assistance. Home with 24/7 assistance of son as needed.      Based on the patient's medical and functional status, their prognosis and  expected level of functional improvement is:  Goals are to achieve a level of  supervision with  mobility and self-care and improved balance.   Rehabilitation  prognosis fair.  Medical prognosis fair.    Signed by: Ijeoma Thakkar RN

## 2023-11-07 NOTE — CONSULTS
Nutrition Services    Patient Name:  Flako Coreas  YOB: 1965  MRN: [id number]  Admit Date:  11/6/2023    Assessment Date:  11/07/23    Summary: Rehab Admission Assessment    Pt is a 58 year old male admitted to acute rehab following hospitalization for CVA and concussion d/t a fall. He has impairments with mobility and self care; no chew/swallow issues. Pt's current diet is Healthy Heart (reg/thins). Appetite is good, intake %. Labs and skin status reviewed (intact).     Plan/Recommend:  Encourage intake at meals.   Monitor intake, labs, weight and skin status.   RD to follow.      CLINICAL NUTRITION ASSESSMENT      Reason for Assessment Nurse Admission Screen     Admitting Diagnosis   Concussion       Medical/Surgical History Past Medical History:   Diagnosis Date    ADHD (attention deficit hyperactivity disorder)     On going issue    Anxiety     Lepe's esophagus     Benign prostatic hyperplasia Surgery in 12/2021    Clotting disorder Intestinal    Depression     Diverticulitis     Diverticulosis     Duodenitis     Enteritis     Failure to thrive (child)     Family history of blood clots     GERD (gastroesophageal reflux disease)     Hyperlipidemia     Hypertension     Hypertensive emergency     Low testosterone     Microcytosis     Pancreatitis     Pneumonia     PONV (postoperative nausea and vomiting)     Seasonal affective disorder     Shoulder injury     Stroke     Unexplained weight loss        Past Surgical History:   Procedure Laterality Date    COLON SURGERY      COLONOSCOPY      COLONOSCOPY N/A 12/11/2022    Procedure: COLONOSCOPY INTO CECUM WITH HOT SNARE POLYPECTOMY;  Surgeon: Albert Gallo MD;  Location: [facility] ENDOSCOPY;  Service: Gastroenterology;  Laterality: N/A;  PRE- GI BLEED  POST- DIVERTICULOSIS, POLYP    ENDOSCOPY N/A 12/10/2022    Procedure: ESOPHAGOGASTRODUODENOSCOPY;  Surgeon: Albert Gallo MD;  Location: [facility] ENDOSCOPY;  Service: Gastroenterology;   "Laterality: N/A;  PRE- DARK STOOLS  POST- BARRETTS ESOPHAGUS    FRACTURE SURGERY  1980    for broken arm    FRACTURE SURGERY      for broken hand    INCISION AND DRAINAGE ABSCESS  2015    anal    PROSTATE SURGERY      SMALL INTESTINE SURGERY      TONSILLECTOMY          Current Nutrition Orders & Evaluation of Intake       Oral Nutrition      Food Allergies NKFA    Current PO Diet Diet: Cardiac Diets; Healthy Heart (2-3 Na+); Texture: Regular Texture (IDDSI 7); Fluid Consistency: Thin (IDDSI 0)    Supplement N/a   PO Evaluation      PO Intake %      Factors Affecting Intake No factors at this time   --  Anthropometrics        Current Height  Current Weight (CBW)  BMI kg/m2 Height: 188 cm (74\")  Weight: 105 kg (232 lb 8 oz) (11/06/23 1505)  Body mass index is 29.85 kg/m².   BMI Category Overweight (25 - 29.9)   Ideal Weight (IBW) 190#   Usual Weight (UBW) 225#   Weight Trend Gain   Weight History Wt Readings from Last 15 Encounters:   11/06/23 1505 105 kg (232 lb 8 oz)   10/26/23 2207 105 kg (231 lb)   10/26/23 2012 111 kg (245 lb)   10/26/23 2235 105 kg (231 lb)   10/23/23 2152 111 kg (245 lb 9.5 oz)   10/23/23 1906 113 kg (249 lb 3.2 oz)   10/23/23 2036 113 kg (249 lb)   09/18/23 1353 103 kg (227 lb)   09/08/23 1506 104 kg (229 lb 12.8 oz)   09/02/23 1427 102 kg (225 lb)   09/02/23 1113 102 kg (225 lb)   08/19/23 2000 102 kg (225 lb)   08/19/23 0515 102 kg (224 lb 10.4 oz)   08/17/23 1933 107 kg (236 lb 1.8 oz)   08/17/23 1021 105 kg (232 lb)   08/16/23 1650 105 kg (232 lb)   08/16/23 1455 102 kg (225 lb)   06/19/23 0904 102 kg (225 lb)   12/28/22 1455 102 kg (225 lb)   12/16/22 1027 102 kg (224 lb)   12/08/22 1643 102 kg (224 lb 8 oz)   12/08/22 0436 102 kg (225 lb)   12/06/22 0947 99.8 kg (220 lb)      --  Physical Findings          General Appearance alert   Oral/Mouth Cavity WDL   Edema  no edema   Gastrointestinal last bowel movement: 11/6   Skin  skin intact   Tubes/Drains/Lines none   NFPE Not indicated " at this time       Medications & Labs           Scheduled Medications amLODIPine, 5 mg, Oral, Daily  aspirin, 81 mg, Oral, Daily  atorvastatin, 80 mg, Oral, Nightly  carvedilol, 6.25 mg, Oral, BID With Meals  folic acid, 1 mg, Oral, Daily  hydrALAZINE, 100 mg, Oral, Q8H  lisinopril, 40 mg, Oral, Daily  melatonin, 10 mg, Oral, Nightly  multivitamin with minerals, 1 tablet, Oral, Daily  pantoprazole, 40 mg, Oral, Q AM  senna-docusate sodium, 2 tablet, Oral, BID  thiamine, 100 mg, Oral, Daily  ticagrelor, 60 mg, Oral, BID  vilazodone, 40 mg, Oral, Daily       Infusions     PRN Medications   acetaminophen    senna-docusate sodium **AND** polyethylene glycol **AND** bisacodyl **AND** bisacodyl    hydrOXYzine            Pertinent Labs   Results from last 7 days   Lab Units 11/06/23 0607 11/05/23 0438 11/04/23 0615   SODIUM mmol/L 141 143 145   POTASSIUM mmol/L 3.5 3.6 3.6   CHLORIDE mmol/L 106 107 109*   CO2 mmol/L 26.8 26.0 24.6   BUN mg/dL 12 14 13   CREATININE mg/dL 0.99 1.07 0.95   CALCIUM mg/dL 8.7 8.8 9.0   BILIRUBIN mg/dL 0.4  --   --    ALK PHOS U/L 61  --   --    ALT (SGPT) U/L 21  --   --    AST (SGOT) U/L 13  --   --    GLUCOSE mg/dL 96 101* 99     Results from last 7 days   Lab Units 11/06/23  0607 11/05/23 0438 11/04/23  0615   MAGNESIUM mg/dL  --   --  1.8   HEMOGLOBIN g/dL 11.5*   < > 12.1*   HEMATOCRIT % 34.7*   < > 36.9*   WBC 10*3/mm3 6.40   < > 7.32   ALBUMIN g/dL 3.8  --   --     < > = values in this interval not displayed.     Results from last 7 days   Lab Units 11/06/23 0607 11/05/23 0438 11/04/23 0615 11/03/23  0643 11/02/23  0710   PLATELETS 10*3/mm3 223 238 242 231 258     Lab Results   Component Value Date    HGBA1C 5.70 (H) 10/24/2023        Nutrition Diagnosis        Nutrition Dx Problem  Problem: Nutrition Appropriate for Condition at this Time  Etiology: MNT for Treatment/Condition   Signs/Symptoms: PO intake       NUTRITION INTERVENTION / PLAN OF CARE  Goals        Intervention  Goal(s) Maintain intake     Nutrition Intervention        RD Action Continue to monitor     Prescription        Diet Prescription    Supplement Prescription    Snack Prescription    EN Prescription    New Prescription Ordered? No changes at this time     Monitor/Evaluation        Monitor Per protocol   Discharge Needs Pending clinical course     RD to follow up per protocol.     Electronically signed by:  Nida Mcrae RD  11/07/23 08:20 EST

## 2023-11-07 NOTE — THERAPY EVALUATION
Inpatient Rehabilitation - Occupational Therapy Initial Evaluation    [facility]     Patient Name: Flako Coreas  : 1965  MRN: [id number]    Today's Date: 2023                 Admit Date: 2023       No diagnosis found.    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    Acute CVA (cerebrovascular accident)    HTN (hypertension)    Late effect of cerebrovascular accident (CVA)    Concussion    Fall    Head injury, closed, with concussion    Alcohol abuse       Past Medical History:   Diagnosis Date    ADHD (attention deficit hyperactivity disorder)     On going issue    Anxiety     Lepe's esophagus     Benign prostatic hyperplasia Surgery in 2021    Clotting disorder Intestinal    Depression     Diverticulitis     Diverticulosis     Duodenitis     Enteritis     Failure to thrive (child)     Family history of blood clots     GERD (gastroesophageal reflux disease)     Hyperlipidemia     Hypertension     Hypertensive emergency     Low testosterone     Microcytosis     Pancreatitis     Pneumonia     PONV (postoperative nausea and vomiting)     Seasonal affective disorder     Shoulder injury     Stroke     Unexplained weight loss        Past Surgical History:   Procedure Laterality Date    COLON SURGERY      COLONOSCOPY      COLONOSCOPY N/A 2022    Procedure: COLONOSCOPY INTO CECUM WITH HOT SNARE POLYPECTOMY;  Surgeon: Albert Gallo MD;  Location: [facility] ENDOSCOPY;  Service: Gastroenterology;  Laterality: N/A;  PRE- GI BLEED  POST- DIVERTICULOSIS, POLYP    ENDOSCOPY N/A 12/10/2022     Procedure: ESOPHAGOGASTRODUODENOSCOPY;  Surgeon: Albert Gallo MD;  Location: [facility] ENDOSCOPY;  Service: Gastroenterology;  Laterality: N/A;  PRE- DARK STOOLS  POST- BARRETTS ESOPHAGUS    FRACTURE SURGERY  1980    for broken arm    FRACTURE SURGERY      for broken hand    INCISION AND DRAINAGE ABSCESS  2015    anal    PROSTATE SURGERY      SMALL INTESTINE SURGERY      TONSILLECTOMY               IRF OT ASSESSMENT FLOWSHEET (last 12 hours)       IRF OT Evaluation and Treatment       Row Name 11/07/23 1651          OT Time and Intention    Document Type initial evaluation  -CC     Mode of Treatment occupational therapy  -CC     Patient Effort excellent  -CC     Symptoms Noted During/After Treatment none  -CC       Row Name 11/07/23 1651          Living Environment    Current Living Arrangements apartment  -CC     Home Accessibility stairs to enter home  -CC     People in Home alone  -CC     Primary Care Provided by self  -CC       Row Name 11/07/23 1651          Home Use of Assistive/Adaptive Equipment    Equipment Currently Used at Home shower chair  -CC       Row Name 11/07/23 1651          Pain Assessment    Pretreatment Pain Rating 0/10 - no pain  -CC     Posttreatment Pain Rating 0/10 - no pain  -CC       Row Name 11/07/23 1651          Cognition/Psychosocial    Affect/Mental Status (Cognition) WFL  -     Orientation Status (Cognition) oriented x 3  -CC     Follows Commands (Cognition) follows one-step commands;75-90% accuracy  -     Personal Safety Interventions fall prevention program maintained;gait belt;nonskid shoes/slippers when out of bed  -CC       Row Name 11/07/23 1651          Range of Motion (ROM)    Range of Motion bilateral upper extremities;ROM is WFL  -CC       Row Name 11/07/23 1651          Range of Motion Comprehensive    General Range of Motion bilateral upper extremity ROM WF  -       Row Name 11/07/23 1651          Strength (Manual Muscle Testing)    Strength (Manual Muscle  Testing) left upper extremity  -CC     Left Hand, Setting 1 (Dynamometer Testing) 55  -CC     Right Hand, Setting 1 (Dynamometer Testing) 105  -CC     Left Hand: Lateral (Key) Pinch Strength (Pinch Dynamometer Testing) 11  -CC     Left Hand: [place] Pinch Strength (Pinch Dynamometer Testing) 5  -CC     Right Hand: Lateral (Key) Pinch Strength (Pinch Dynamometer Testing) 15  -CC     Right Hand: [place] Pinch Strength (Pinch Dynamometer Testing) 20  -CC     Additional Documentation Left Hand, Setting 1 (Dynamometer Testing) (Row);Left Hand: [place]) Pinch Strength (Pinch Dynamometer Testing) (Row);Left Hand: Lateral (Key) Pinch Strength (Pinch Dynamometer Testing) (Row);Right Hand, Setting 1 (Dynamometer Testing) (Row);Right Hand: Lateral (Key) Pinch Strength (Pinch Dynamometer Testing) (Row);Right Hand: [place] Pinch Strength (Pinch Dynamometer Testing) (Row)  -       Row Name 11/07/23 1651          Strength Comprehensive (MMT)    General Manual Muscle Testing (MMT) Assessment upper extremity strength deficits identified  -       Row Name 11/07/23 1651          Upper Extremity (Manual Muscle Testing)    Upper Extremity: Manual Muscle Testing (MMT) right UE strength is WNL;left shoulder strength deficit;left elbow/forearm strength deficit;left wrist strength deficit  -       Row Name 11/07/23 1651          Left Shoulder (Manual Muscle Testing)    Left Shoulder Manual Muscle Testing (MMT) flexion  -     MMT: Flexion, Left Shoulder flexion  -     MMT, Gross Movement: Left Shoulder Flexion (4/5) good  -CC       Row Name 11/07/23 1651          Left Elbow/Forearm (Manual Muscle Testing)    Left Elbow/Forearm Manual Muscle Testing (MMT) flexion  -     MMT: Flexion, Left Elbow flexion  -     MMT, Gross Movement: Left Elbow Flexion (4+/5) good plus  -       Row Name 11/07/23 1651          Left Wrist (Manual Muscle Testing)    Left Wrist Manual Muscle Testing (MMT)  flexion  -     MMT, Gross Movement: Left Wrist Flexion (4-/5) good minus  -       Row Name 11/07/23 1651          Sensory    Additional Documentation Hearing Assessment (Group);Sensory Assessment (Somatosensory) (Group);Vision Assessment/Intervention (Group)  -       Row Name 11/07/23 1651          Hearing Assessment    Hearing Status WFL  -       Row Name 11/07/23 1651          Vision Assessment/Intervention    Visual Impairment/Limitations diplopia  -       Row Name 11/07/23 1651          Sensory Assessment (Somatosensory)    Sensory Assessment (Somatosensory) UE sensation intact  Light touch and sterognosis  -       Row Name 11/07/23 1651          Basic Activities of Daily Living (BADLs)    Basic Activities of Daily Living bathing;upper body dressing;lower body dressing;grooming;toileting  -       Row Name 11/07/23 1651          Bathing    [place] (Bathing) bathing skills;lower body;upper body;contact guard assist  -     Assistive Device (Bathing) shower chair;hand held shower spray hose;grab bar/tub rail  -     Position (Bathing) supported sitting;supported standing  -     Set-up Assistance (Bathing) adjust water temperature;adaptive equipment set-up;obtain supplies  -       Row Name 11/07/23 1651          Upper Body Dressing    [place] (Upper Body Dressing) upper body dressing skills;doff;don;pull over garment;supervision  -     Position (Upper Body Dressing) supported sitting  -     Set-up Assistance (Upper Body Dressing) obtain clothing  -       Row Name 11/07/23 1651          Lower Body Dressing    [place] (Lower Body Dressing) doff;don;pants/bottoms;shoes/slippers;socks;underwear;minimum assist (75% patient effort)  -     Position (Lower Body Dressing) supported sitting;supported standing  -     Set-up Assistance (Lower Body Dressing) obtain clothing  -       Row Name 11/07/23 1651          Grooming    [place] (Grooming) grooming  skills;deodorant application;hair care, combing/brushing;oral care regimen;wash face, hands;standby assist  -     Position (Grooming) sink side;supported sitting  -     Set-up Assistance (Grooming) obtain supplies  -       Row Name 11/07/23 1651          Toileting    [place] Level (Toileting) toileting skills;adjust/manage clothing;contact guard assist  -     Position (Toileting) supported standing;unsupported sitting  -       Row Name 11/07/23 1651          Bed Mobility    Comment, (Bed Mobility) in w/c  -       Row Name 11/07/23 1651          Transfer Assessment/Treatment    Transfers toilet transfer;shower transfer  -       Row Name 11/07/23 1651          Chair-Bed Transfer    Chair-Bed [place] (Transfers) contact guard  -     Assistive Device (Chair-Bed Transfers) walker, front-wheeled  -       Row Name 11/07/23 1651          Sit-Stand Transfer    Sit-Stand [place] (Transfers) contact guard  -     Assistive Device (Sit-Stand Transfers) walker, front-wheeled  -       Row Name 11/07/23 1651          Toilet Transfer    Type (Toilet Transfer) stand pivot/stand step  -     [place] Level (Toilet Transfer) contact guard;minimum assist (75% patient effort)  -     Assistive Device (Toilet Transfer) grab bars/safety frame  -       Row Name 11/07/23 1651          Shower Transfer    Type (Shower Transfer) stand pivot/stand step  -CC     [place] Level (Shower Transfer) contact guard;minimum assist (75% patient effort)  -     Assistive Device (Shower Transfer) shower chair;wheelchair;grab bar, tub/shower  -       Row Name 11/07/23 1651          Safety Issues, Functional Mobility    Safety Issues Affecting Function (Mobility) judgment  -     Impairments Affecting Function (Mobility) balance;coordination;strength  -       Row Name 11/07/23 1651          Motor Skills    Motor Skills functional endurance;coordination  -     Coordination fine motor deficit;9 [place] Peg  Test of Fine Motor Coordination Results  -     Results, 9 [place] Peg Test of Fine Motor Coordination RUE 23 LUE 29 Box blocks RUE 69 LUE 58  -     Functional Endurance fair+  -       Row Name 11/07/23 1651          Balance    Static Sitting Balance independent  -     Static Standing Balance contact guard  -     Dynamic Standing Balance minimal assist  -       Row Name 11/07/23 1651          Positioning and Restraints    Pre-Treatment Position sitting in chair/recliner  -     Post Treatment Position wheelchair  -     In Wheelchair sitting;exit alarm on;with PT  -       Row Name 11/07/23 1651          Therapy Assessment/Plan (OT)    OT Diagnosis Pt presents to unit after hospitalization for  vertigo. Pt presehnts w incraese burden of care on others, decraesed ability to care for self.  -     Rehab Potential/Prognosis (OT) good, to achieve stated therapy goals  -     Frequency of Treatment (OT) 5 times per week  -     Estimated Duration of Therapy (OT) 2 weeks  -     Planned Therapy Interventions (OT) activity tolerance training;adaptive equipment training;BADL retraining;functional balance retraining;neuromuscular control/coordination retraining;strengthening exercise;transfer/mobility retraining;patient/caregiver education/training  -       Row Name 11/07/23 1651          Evaluation Complexity (OT)    Review Occupational Profile/Medical/Therapy History Complexity expanded/moderate complexity  -     Assessment, Occupational Performance/Identification of Deficit Complexity 3-5 performance deficits  -     Clinical Decision Making Complexity (OT) detailed assessment/moderate complexity  -     Overall Complexity of Evaluation (OT) moderate complexity  -       Row Name 11/07/23 1651          Therapy Plan Review/Discharge Plan (OT)    Therapy Plan Review (OT) evaluation/treatment results reviewed;care plan/treatment goals reviewed;participants voiced agreement with care plan;participants  included;patient  -CC     Anticipated Discharge Disposition (OT) home  -CC       Row Name 11/07/23 1651          IRF OT Goals    Transfer Goal Selection (OT-IRF) transfers, OT goal 1;transfers, OT goal 2  -CC     Bathing Goal Selection (OT-IRF) bathing, OT goal 1  -CC     LB Dressing Goal Selection (OT-IRF) LB dressing, OT goal 1  -CC     Toileting Goal Selection (OT-IRF) toileting, OT goal 1  -CC     Strength Goal Selection (OT-IRF) strength, OT goal 1 (free text)  -CC     Balance Goal Selection (OT) balance, OT goal 1;balance, OT goal 2  -CC     Coordination Goal Selection (OT) coordination, OT goal 1  -CC     Caregiver Training Goal Selection (OT-IRF) caregiver training, OT goal 1  -CC       Row Name 11/07/23 1651          Transfer Goal 1 (OT-IRF)    Activity/Assistive Device (Transfer Goal 1, OT-IRF) shower chair;toilet  -CC     [place] Level (Transfer Goal 1, OT-IRF) contact guard required;standby assist  -CC     Time Frame (Transfer Goal 1, OT-IRF) short-term goal (STG);1 week  -CC     Progress/Outcomes (Transfer Goal 1, OT-IRF) continuing progress toward goal  -CC       Row Name 11/07/23 1651          Transfer Goal 2 (OT-IRF)    Activity/Assistive Device (Transfer Goal 2, OT-IRF) toilet  -CC     [place] Level (Transfer Goal 2, OT-IRF) modified independence  -CC     Time Frame (Transfer Goal 2, OT-IRF) long-term goal (LTG);by discharge  -CC     Progress/Outcomes (Transfer Goal 2, OT-IRF) continuing progress toward goal  -CC       Row Name 11/07/23 1651          Bathing Goal 1 (OT-IRF)    Activity/Device (Bathing Goal 1, OT-IRF) bathing skills, all  -CC     [place] Level (Bathing Goal 1, OT-IRF) modified independence  -CC     Time Frame (Bathing Goal 1, OT-IRF) long-term goal (LTG);by discharge  -CC     Progress/Outcomes (Bathing Goal 1, OT-IRF) continuing progress toward goal  -CC       Row Name 11/07/23 1651          LB Dressing Goal 1 (OT-IRF)    Activity/Device (LB Dressing Goal 1, OT-IRF)  lower body dressing  -CC     [place] (LB Dressing Goal 1, OT-IRF) modified independence  -CC     Time Frame (LB Dressing Goal 1, OT-IRF) long-term goal (LTG);by discharge  -CC     Progress/Outcomes (LB Dressing Goal 1, OT-IRF) continuing progress toward goal  -CC       Row Name 11/07/23 1651          Toileting Goal 1 (OT-IRF)    Activity/Device (Toileting Goal 1, OT-IRF) toileting skills, all  -CC     [place] Level (Toileting Goal 1, OT-IRF) modified independence  -CC     Progress/Outcomes (Toileting Goal 1, OT-IRF) continuing progress toward goal  -CC     Time Frame (Toileting Goal 1, OT-IRF) long-term goal (LTG);by discharge  -CC       Row Name 11/07/23 1651          Strength Goal 1 (OT-IRF)    Strength Goal 1 (OT-IRF) incraese LUE strength 1/2 grade to assist w daily tasks  -CC     Time Frame (Strength Goal 1, OT-IRF) long-term goal (LTG)  -CC     Progress/Outcomes (Strength Goal 1, OT-IRF) continuing progress toward goal  -CC       Row Name 11/07/23 1651          Balance Goal 1 (OT)    Activity/Assistive Device (Balance Goal 1, OT) standing, static  -CC     [place] Level/Cues Needed (Balance Goal 1, OT) supervision required  -CC     Time Frame (Balance Goal 1, OT) 1 week;short term goal (STG)  -CC     Progress/Outcomes (Balance Goal 1, OT) continuing progress toward goal  -CC       Row Name 11/07/23 1651          Balance Goal 2 (OT)    Activity/Assistive Device (Balance Goal 2, OT) standing, dynamic  -CC     [place] Level (Balance Goal 2, OT) conditional independence  -CC     Time Frame (Balance Goal 2, OT) long term goal (LTG);by discharge  -CC     Progress/Outcome (Balance Goal 2, OT) continuing progress toward goal  -CC       Row Name 11/07/23 1651          Coordination Goal 1 (OT)    Activity/Assistive Device (Coordination Goal 1, OT) FM written ex program  -CC     [place] Level/Cues Needed (Coordination Goal 1, OT) independent  -CC     Time Frame (Coordination Goal 1, OT) long term  goal (LTG);by discharge  -CC     Progress/Outcomes (Coordination Goal 1, OT) continuing progress toward goal  -CC       Row Name 11/07/23 1651          Caregiver Training Goal 1 (OT-IRF)    Caregiver Training Goal 1 (OT-IRF) Pt I w home safety and HEP  -CC     Time Frame (Caregiver Training Goal 1, OT-IRF) long-term goal (LTG);by discharge  -CC     Progress/Outcomes (Caregiver Training Goal 1, OT-IRF) continuing progress toward goal  -CC               User Key  (r) = Recorded By, (t) = Taken By, (c) = Cosigned By      Initials Name Effective Dates    CC Rosa Chris, OTR 06/16/21 -                      Occupational Therapy Education       Title: PT OT SLP Therapies (In Progress)       Topic: Occupational Therapy (Done)       Point: ADL training (Done)       Description:   Instruct learner(s) on proper safety adaptation and remediation techniques during self care or transfers.   Instruct in proper use of assistive devices.                  Learning Progress Summary             Patient Acceptance, E, VU by  at 11/7/2023 1649    Comment: Explanation of OT services, evaluation results and goal setting                         Point: Home exercise program (Done)       Description:   Instruct learner(s) on appropriate technique for monitoring, assisting and/or progressing therapeutic exercises/activities.                  Learning Progress Summary             Patient Acceptance, E, VU by CC at 11/7/2023 1649    Comment: Explanation of OT services, evaluation results and goal setting                         Point: Precautions (Done)       Description:   Instruct learner(s) on prescribed precautions during self-care and functional transfers.                  Learning Progress Summary             Patient Acceptance, E, VU by CC at 11/7/2023 1649    Comment: Explanation of OT services, evaluation results and goal setting                         Point: Body mechanics (Done)       Description:   Instruct learner(s) on  proper positioning and spine alignment during self-care, functional mobility activities and/or exercises.                  Learning Progress Summary             Patient Acceptance, E, VU by  at 11/7/2023 1649    Comment: Explanation of OT services, evaluation results and goal setting                                         User Key       Initials Effective Dates Name Provider Type Discipline     06/16/21 -  Rosa Chris OTR Occupational Therapist OT                        OT Recommendation and Plan    Planned Therapy Interventions (OT): activity tolerance training, adaptive equipment training, BADL retraining, functional balance retraining, neuromuscular control/coordination retraining, strengthening exercise, transfer/mobility retraining, patient/caregiver education/training                    Time Calculation:      Time Calculation- OT       Row Name 11/07/23 0915             Time Calculation- OT    OT Start Time 0915  -      OT Stop Time 1015  -      OT Time Calculation (min) 60 min  -                User Key  (r) = Recorded By, (t) = Taken By, (c) = Cosigned By      Initials Name Provider Type     Rosa Chris OTR Occupational Therapist                  Therapy Charges for Today       Code Description Service Date Service Provider Modifiers Qty    39169570854  OT EVAL MOD COMPLEXITY 2 11/7/2023 Rosa Chris OTR GO 1    [id number]  OT SELF CARE/MGMT/TRAIN EA 15 MIN 11/7/2023 Rosa Chris OTR GO 3                     BEATA Mejia  11/7/2023

## 2023-11-07 NOTE — PLAN OF CARE
Goal Outcome Evaluation:  Plan of Care Reviewed With: patient      Cognitive-Linguistic Quick Test (CLQT) administered. Pt scored an overall composite severity rating of 3.2/4.0, indicating a mild cognitive-communication deficit. He scored WNL in the domains of executive functions and language, mildly impaired in the domains of attention and visuospatial skills and moderately impaired in the domain of memory. Pt scored below the criterion cut-off scores for his age on symbol cancellation (10/12), clock drawing (11/13) story retelling (4/10), and symbol trails (5/10). He exhibited adequate performance on stating personal facts, confrontation naming, generative naming, design memory, mazes and design generation. Pt exhibited impulsivity during assessment, often starting written tasks before clinician had finished instructions.     Pt c/o reduced word retrieval and ability to maintain train of thought in conversation. [place] Naming Test administered. Pt scored 51/60, which is 1 SD below the mean for his age group, indicating mild anomia. In additional informal testing, pt exhibited mild-moderately reduced verbal working memory and mildly reduced alternating attention. Recommend continued intensive inpatient speech therapy to improve cognitive-communication skills in order to improve pt's level of independence.

## 2023-11-07 NOTE — PROGRESS NOTES
SECTION GG    Mobility Performance:     Roll Left and Right: Ruth provides verbal cues and/or touching/steadying  and/or contact guard assistance as patient completes activity. Assistance may be  provided throughout the activity or intermittently.   Sit to Lying: [place] provides verbal cues and/or touching/steadying and/or  contact guard assistance as patient completes activity. Assistance may be  provided throughout the activity or intermittently.   Lying to Sitting on Side of Bed: Ruth provides verbal cues and/or  touching/steadying and/or contact guard assistance as patient completes  activity. Assistance may be provided throughout the activity or intermittently.   Sit to Stand: Ruth provides verbal cues and/or touching/steadying and/or  contact guard assistance as patient completes activity. Assistance may be  provided throughout the activity or intermittently.   Chair/Bed to Chair Transfer: Ruth provides verbal cues and/or  touching/steadying and/or contact guard assistance as patient completes  activity. Assistance may be provided throughout the activity or intermittently.   Car Transfer: Ruth provides verbal cues and/or touching/steadying and/or  contact guard assistance as patient completes activity. Assistance may be  provided throughout the activity or intermittently.   Walk 10 Feet:   Ruth does less than half the effort. Ruth lifts, holds or  supports trunk or limbs but provides less than half the effort.  Walk 50 Feet with 2 Turns:   Ruth does less than half the effort. Ruth  lifts, holds or supports trunk or limbs but provides less than half the effort.  Walk 150 Feet:   Ruth does less than half the effort. Ruth lifts, holds or  supports trunk or limbs but provides less than half the effort.  Walking 10 Feet on Uneven Surfaces:   Ruth does less than half the effort.  Ruth lifts, holds or supports trunk or limbs but provides less than half the  effort.  1 Step Over Curb or  Up/Down Stair:   [place] does less than half the effort.  [place] lifts, holds or supports trunk or limbs but provides less than half the  effort.  4 Steps Up and Down, With/Without Rail:   [place] does less than half the effort.  [place] lifts, holds or supports trunk or limbs but provides less than half the  effort.  12 Steps Up and Down, With/Without Rail:   Not attempted due to medical or  safety concerns.  Picking up an Object:   Not attempted due to medical or safety concerns.  Uses Wheelchair/Scooter: No    Mobility Discharge Goals:   Chair/Bed to Chair Transfer: Patient completed the activities by themself with  no assistance from a helper.    Section J. Health Conditions (Pain):  Pain Interference with Therapy Activities:   Rarely or not at all  Pain Interference with Day-to-Day Activities:   Rarely or not at all    Signed by: Trinity Velez, PT

## 2023-11-07 NOTE — PROGRESS NOTES
Physical Medicine and Rehabilitation  Inpatient Rehabilitation Interdisciplinary Plan of Care    Demographics            Age: 58Y            Gender: Male    Admission Date: 11/6/2023 3:04:00 PM  Rehabilitation Diagnosis:  Status post concussion  History of CVA  Status post concussion    History of CVA-aspirin/Brilinta/statin    Impaired mobility  Impaired self-care  Impaired cognition    Hypertension-amlodipine/carvedilol/hydralazine/lisinopril    Hyperlipidemia    Gastroesophageal reflux disease    Depression-Viibryd    DVT prophylaxis-on dual antiplatelet therapy.  SCDs    GI bleed-evaluated by gastroenterology-Plan is to wait 3 months before he can  come off of Brilinta to undergo possible colonoscopy.    Plan of Care  Anticipated Discharge Date/Estimated Length of Stay: 2 weeks  Anticipated Discharge Destination: Community discharge with assistance  Discharge Plan : Home with 24/7 assistance of son as needed.  Medical Necessity Expected Level Rationale: Goals are to achieve a level of  supervision with  mobility and self-care and improved balance.   Rehabilitation  prognosis fair.  Medical prognosis fair.  Intensity and Duration: an average of 3 hours/5 days per week  Medical Supervision and 24 Hour Rehab Nursing: x  Physical Therapy: x  PT Intensity/Duration: 1 hour / day, 5 days / week, for approximately 2 weeks  Occupational Therapy: x  OT Intensity/Duration: 1 hour / day, 5 days / week, for approximately 2 weeks  Speech and Language Therapy: x  SLP Intensity/Duration: 1 hour / day, 5 days / week, for approximately 2 weeks  Social Work: x  Therapeutic Recreation: x  Psychology: x  Registered Dietician: x  Updated (if changes indicated)  No changes to plan.    Based on the patient's medical and functional status, their prognosis and  expected level of functional improvement is: Goals are to achieve a level of  supervision with  mobility and self-care and improved balance.   Rehabilitation  prognosis fair.   Medical prognosis fair.    Interdisciplinary Problem/Goals/Status  Safety    [RN] Potential for Injury(Active)  Current Status(11/07/2023): Uses call light appropriately  Weekly Goal(11/14/2023): Cues to use call light  Discharge Goal: Items within reach        Psychosocial    [RN] Behavior(Active)  Current Status(11/07/2023): Anxity related to hx of strokes and fall  Weekly Goal(11/14/2023): Allow opportunity to express concers regarding current  status.  Discharge Goal: Demonstrate healthy oping strategies.        Sphincter Control    [RN] Bowel Management(Active)  Current Status(11/07/2023): continent 100%  Weekly Goal(11/14/2023): continet 100%  Discharge Goal: continent 100%    [RN] Bladder Management(Active)  Current Status(11/07/2023): continent 100%  Weekly Goal(11/14/2023): Continent 100%  Discharge Goal: Continent 100%      Comments:    Signed by: Pk Snow MD

## 2023-11-07 NOTE — PLAN OF CARE
Problem: Fall Injury Risk  Goal: Absence of Fall and Fall-Related Injury  Outcome: Ongoing, Progressing  Fall precautions maintained     Neuro-Pt alert and oriented  GI-Pt cont of urine  -Last BM 11/6  Pain-Pt denied pain

## 2023-11-07 NOTE — PROGRESS NOTES
"Section B. Hearing, Speech, Vision: Expression of Ideas and Wants: Exhibits some  difficulty with expressing needs and ideas (e.g., some words or finishing  thoughts) or speech is not clear.  Understanding Verbal and Non-Verbal Content: Understands: Clear comprehension  without cues or repetitions.    Section C. Cognitive Patterns: Brief Interview for Mental Status (BIMS) was  conducted.  Repetition of Three Words: Three words  Able to report correct year: Correct  Able to report correct month: Accurate within 5 days  Able to report correct day of the week: Correct  Able to recall \"sock\": Yes, no cue required  Able to recall \"blue\": Yes, no cue required  Able to recall \"bed\": No, could not recall    BIMS SUMMARY SCORE: 13 Cognitively intact Patient was able to complete the Brief  Interview for Mental Status    Section C. Signs and Symptoms of Delirium (from CAM): Evidence of Acute Change  in Mental Status:   No  Inattention: Behavior not present  Thinking Disorganized or Incoherent:   Behavior not present  Altered Level of Consciousness:   Behavior not present    Signed by: Yoselyn Gudino, SLP    "

## 2023-11-07 NOTE — PROGRESS NOTES
Inpatient Rehabilitation Admission  Section A. Transportation  Issues Due to Lack of Transportation:   No    Signed by: Elle Dukes, Social Work

## 2023-11-07 NOTE — THERAPY EVALUATION
Inpatient Rehabilitation - Speech Language Pathology Initial Evaluation    [facility]     Patient Name: Flako Coreas  : 1965  MRN: [id number]    Today's Date: 2023                   Admit Date: 2023       Visit Dx:    No diagnosis found.    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    Acute CVA (cerebrovascular accident)    HTN (hypertension)    Late effect of cerebrovascular accident (CVA)    Concussion    Fall    Head injury, closed, with concussion    Alcohol abuse       Past Medical History:   Diagnosis Date    ADHD (attention deficit hyperactivity disorder)     On going issue    Anxiety     Lepe's esophagus     Benign prostatic hyperplasia Surgery in 2021    Clotting disorder Intestinal    Depression     Diverticulitis     Diverticulosis     Duodenitis     Enteritis     Failure to thrive (child)     Family history of blood clots     GERD (gastroesophageal reflux disease)     Hyperlipidemia     Hypertension     Hypertensive emergency     Low testosterone     Microcytosis     Pancreatitis     Pneumonia     PONV (postoperative nausea and vomiting)     Seasonal affective disorder     Shoulder injury     Stroke     Unexplained weight loss        Past Surgical History:   Procedure Laterality Date    COLON SURGERY      COLONOSCOPY      COLONOSCOPY N/A 2022    Procedure: COLONOSCOPY INTO CECUM WITH HOT SNARE POLYPECTOMY;  Surgeon: Albert Gallo MD;  Location: [facility] ENDOSCOPY;  Service: Gastroenterology;  Laterality: N/A;  PRE- GI BLEED  POST- DIVERTICULOSIS, POLYP    ENDOSCOPY N/A  "12/10/2022    Procedure: ESOPHAGOGASTRODUODENOSCOPY;  Surgeon: Albert Gallo MD;  Location: [facility] ENDOSCOPY;  Service: Gastroenterology;  Laterality: N/A;  PRE- DARK STOOLS  POST- BARRETTS ESOPHAGUS    FRACTURE SURGERY  1980    for broken arm    FRACTURE SURGERY      for broken hand    INCISION AND DRAINAGE ABSCESS  2015    anal    PROSTATE SURGERY      SMALL INTESTINE SURGERY      TONSILLECTOMY         SLP Recommendation and Plan    SLP Diagnosis: mild, cognitive-linguistic disorder (mild anomia) (11/07/23 0830)        Rehab Potential/Prognosis: good (11/07/23 0830)     Anticipated Discharge Disposition (SLP): home with OP services (11/07/23 0830)        Predicted Duration Therapy Intervention (Days): until discharge (11/07/23 0830)                 Plan of Care Reviewed With: patient (11/07/23 1202)                SLP EVALUATION (last 72 hours)       SLP SLC Evaluation       Row Name 11/07/23 0830                   Communication Assessment/Intervention    Document Type evaluation  -AL        Subjective Information no complaints  -AL        Patient Observations alert;cooperative  -AL        Patient/Family/Caregiver Comments/Observations Pt participated well. Seen upright in wheelchair in therapy office.  -AL        Patient Effort excellent  -AL        Symptoms Noted During/After Treatment none  -AL           General Information    Patient Profile Reviewed yes  -AL        Pertinent History Of Current Problem Pt is a 58-year-old male admitted to inpatient rehab under diagnosis \"s/p concussion\" with onset date 10/23/23. Pt with history of L cerebellar CVA and was hospitalized 8/16-8/24. Speech therapy evaluation on that admission found mild cognitive-communication deficit; however, pt reported he did not participate in outpatient speech therapy. MRI 10/26/23 showed \"5 mm focus of T2 shine through on the DWI images in the left cerebellum   consistent with old lacunar infarct is less prominent than on the   " "comparison study.  No acute infarcts are identified.  Mild scattered T2   hyperintensities consistent with chronic small vessel disease, unchanged.\" Pt with history of ADHD, anxiety. Reported prior to admission marijuana use and ETOH use (70 drinks per week per MD note). Pt reports difficulty with word retrieval, maintaining his train of thought, and recalling numbers. He reports symptoms have persisted since CVA in Aug 2023. He reported he attempted a job interview for a management position after CVA and was unable to communicate adequately during the interview.  -AL        Precautions/Limitations, Vision --  Reports some double vision  -AL        Precautions/Limitations, Hearing WFL;for purposes of eval  -AL        Patient Level of Education Bachelor's degree in Loss Prevention Management and Business. 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  -AL        Prior Level of Function-Communication WFL  prior to CVA in August  -AL        Plans/Goals Discussed with patient  -AL        Barriers to Rehab none identified  -AL        Patient's Goals for Discharge functional communication;functional cognition;return to all previous roles/activities  -AL           Pain Scale: Numbers Pre/Post-Treatment    Pretreatment Pain Rating 0/10 - no pain  -AL        Posttreatment Pain Rating 0/10 - no pain  -AL           Motor Speech Assessment/Intervention    Motor Speech, Comment No overt dysarthria observed in conversation. Occasional hesitations/dysfluencies noted.  -AL           Cognitive Assessment Intervention- SLP    Cognition, Comment BIMS: Pt was oriented to month, year and [place]ORE[place]. He recalled 2/3 unrelated words after 2 minute delay; MAX cues did not increase accuracy.  -AL           Standardized Tests    Formal Speech Language Tests [place] Naming  -AL        Cognitive/Memory Tests CLQT: Cognitive Linguistic Quick Test  -AL           [place] Naming    Correct Spontaneous Responses 51  -AL        Stimulus Cues Given 1  -AL        " Correct Responses Following Stimulus Cues 1  -AL        Phonemic Cues Given 8  -AL        Correct Responses Following Phonemic Cues 2  -AL        Total Score 51  -AL        [place] Naming Comments BNT Norms for Adults 50-59: Mean 55.2, SD 4.0. Pt scored 51/60, indicating that he is 1 SD below the mean for his age. Pt presents with mild anomia in conversation. He independently utilizes circumlocution to compensate. Picture description from WAB assessment appeared WFL. Pt reports difficulty recalling specific information (i.e. name of a ), difficulty recalling specific numbers/percentages and difficulty maintaining his train of thought in conversation.  -AL           CLQT (The Cognitive Linguistic Quick Test)    Attention Domain Score 160  -AL        Attention Severity Rating 3: Mild  -AL        Memory Domain Score 138  -AL        Memory Severity Rating 2: Moderate  -AL        Executive Function Domain Score 29  -AL        Executive Function Severity Rating 4: WNL  -AL        Language Domain Score 30  -AL        Language Severity Rating 4: WNL  -AL        Visuospatial Domain Score 80  -AL        Visuospatial Severity Rating 3: Mild  -AL        Clock Drawing Total Score 11  -AL        Clock Drawing Severity Rating Mild  -AL        Composite Severity Rating 3.2  -AL        Composite Severity Rating Range 3.4 - 2.5: Mild  -AL        CLQT Comments Cognitive-Linguistic Quick Test (CLQT) administered. Pt scored an overall composite severity rating of 3.2/4.0, indicating a mild cognitive-communication deficit. He scored WNL in the domains of executive functions and language, mildly impaired in the domains of attention and visuospatial skills and moderately impaired in the domain of memory. Pt scored below the criterion cut-off scores for his age on symbol cancellation (10/12), clock drawing (11/13) story retelling (4/10), and symbol trails (5/10). He exhibited adequate performance on stating personal facts, confrontation  naming, generative naming, design memory, mazes and design generation. Pt exhibited impulsivity during assessment, often starting written tasks before clinician had finished instructions. Pt c/o reduced word retrieval and ability to maintain train of thought in conversation. [place] Naming Test administered. Pt scored 51/60, which is 1 SD below the mean for his age group, indicating mild anomia. In additional informal testing, pt exhibited mild-moderately reduced verbal working memory and mildly reduced alternating attention. Recommend continued intensive inpatient speech therapy to improve cognitive-communication skills in order to improve pt's level of independence.  -AL           SLP Evaluation Clinical Impressions    SLP Diagnosis mild;cognitive-linguistic disorder  mild anomia  -AL        Rehab Potential/Prognosis good  -AL        SLC Criteria for Skilled Therapy Interventions Met yes  -AL        Functional Impact functional impact in social situations;needs 24 hour supervision;difficulty completing home management task;difficulty completing vocational tasks  -AL           Recommendations    Therapy Frequency (SLP SLC) 2 times/day;5 days per week  -AL        Predicted Duration Therapy Intervention (Days) until discharge  -AL        Anticipated Discharge Disposition (SLP) home with OP services  -AL           Communication Treatment Objective and Progress Goals (SLP)    SLC LTGs Patient will demonstrate functional language skills for return to discharge environment;Patient will demonstrate functional cognitive-linguistic skills for return to discharge environment  -AL        Verbal Expression Treatment Objectives Verbal Expression Treatment Objectives (Group)  -AL        Cognitive Linguistic Treatment Objectives Cognitive Linguistic Treatment Objectives (Group)  -AL           Patient will demonstrate functional language skills for return to discharge environment     [place] with use of compensatory strategies   -AL        Time frame by discharge  -AL           Patient will demonstrate functional cognitive-linguistic skills for return to discharge environment    [place] with use of compensatory strategies  -AL        Time frame by discharge  -AL           Verbal Expression Treatment Objectives    Word Retrieval Skills Selection word retrieval, SLP goal 1  -AL           Word Retrieval Skills Goal 1 (SLP)    Improve Word Retrieval Skills By Goal 1 (SLP) low frequency;responsive naming task;confrontational naming task;supplying an antonym;supplying a synonym;completing a divergent task;80%;independently (over 90% accuracy)  -AL        Time Frame (Word Retrieval Goal 1, SLP) 1 week  -AL           Cognitive Linguistic Treatment Objectives    Attention Selection attention, SLP goal 1  -AL        Memory Skills Selection memory skills, SLP goal 1  -AL        Functional Math Skills Selection functional math skills, SLP goal 1  -AL        Executive Function Skills Selection executive function skills, SLP goal 1  -AL           Attention Goal 1 (SLP)    Improve Attention by Goal 1 (SLP) complete divided attention task;complete selective attention task;90%;independently (over 90% accuracy)  -AL        Time Frame (Attention Goal 1, SLP) 1 week  -AL           Memory Skills Goal 1 (SLP)    Improve Memory Skills Through Goal 1 (SLP) recalling unrelated word lists with an imposed delay;listen to a paragraph and answer questions;repeat list in sequential order;recall details from a word list;use memory strategies;90%;independently (over 90% accuracy)  -AL        Time Frame (Memory Skills Goal 1, SLP) 1 week  -AL           Functional Math Skills Goal 1 (SLP)    Improve Functional Math Skills Through Goal 1 (SLP) complete functional math task;90%;independently (over 90% accuracy)  -AL        Time Frame (Functional Math Skills Goal 1, SLP) 1 week  -AL           Executive Functional Skills Goal 1 (SLP)    Improve Executive Function Skills  Goal 1 (SLP) identify strategies, strengths, limitations;home management activity;perform self-evaluation;90%;independently (over 90% accuracy)  -AL        Time Frame (Executive Function Skills Goal 1, SLP) 1 week  -AL                  User Key  (r) = Recorded By, (t) = Taken By, (c) = Cosigned By      Initials Name Effective Dates    AL Remigiofarshad Yoselyn Blanchard, MS CCC-SLP 06/16/21 -                        EDUCATION    The patient has been educated in the following areas:       Cognitive Impairment Communication Impairment.             SLP GOALS       Row Name 11/07/23 0830             Patient will demonstrate functional language skills for return to discharge environment     [place] with use of compensatory strategies  -AL      Time frame by discharge  -AL         Patient will demonstrate functional cognitive-linguistic skills for return to discharge environment    [place] with use of compensatory strategies  -AL      Time frame by discharge  -AL         Word Retrieval Skills Goal 1 (SLP)    Improve Word Retrieval Skills By Goal 1 (SLP) low frequency;responsive naming task;confrontational naming task;supplying an antonym;supplying a synonym;completing a divergent task;80%;independently (over 90% accuracy)  -AL      Time Frame (Word Retrieval Goal 1, SLP) 1 week  -AL         Attention Goal 1 (SLP)    Improve Attention by Goal 1 (SLP) complete divided attention task;complete selective attention task;90%;independently (over 90% accuracy)  -AL      Time Frame (Attention Goal 1, SLP) 1 week  -AL         Memory Skills Goal 1 (SLP)    Improve Memory Skills Through Goal 1 (SLP) recalling unrelated word lists with an imposed delay;listen to a paragraph and answer questions;repeat list in sequential order;recall details from a word list;use memory strategies;90%;independently (over 90% accuracy)  -AL      Time Frame (Memory Skills Goal 1, SLP) 1 week  -AL         Functional Math Skills Goal 1 (SLP)    Improve Functional  Math Skills Through Goal 1 (SLP) complete functional math task;90%;independently (over 90% accuracy)  -AL      Time Frame (Functional Math Skills Goal 1, SLP) 1 week  -AL         Executive Functional Skills Goal 1 (SLP)    Improve Executive Function Skills Goal 1 (SLP) identify strategies, strengths, limitations;home management activity;perform self-evaluation;90%;independently (over 90% accuracy)  -AL      Time Frame (Executive Function Skills Goal 1, SLP) 1 week  -AL                User Key  (r) = Recorded By, (t) = Taken By, (c) = Cosigned By      Initials Name Provider Type    Yoselyn Shah MS CCC-SLP Speech and Language Pathologist                                Time Calculation:        Time Calculation- SLP       Row Name 11/07/23 1206 11/07/23 1205          Time Calculation- SLP    SLP Start Time 1030  -AL 0830  -AL     SLP Stop Time 1100  -AL 0900  -AL     SLP Time Calculation (min) 30 min  -AL 30 min  -AL     SLP Received On 11/07/23  -AL 11/07/23  -AL               User Key  (r) = Recorded By, (t) = Taken By, (c) = Cosigned By      Initials Name Provider Type    Yoselyn Shah MS CCC-SLP Speech and Language Pathologist                      Therapy Charges for Today       Code Description Service Date Service Provider Modifiers Qty    [id number]  ST STD COG PERF TEST PER HOUR 11/7/2023 Yoselyn Gudino MS CCC-SLP GN 1                             Yoselyn Gudino MS CCC-SLP  11/7/2023

## 2023-11-07 NOTE — PROGRESS NOTES
Inpatient Rehabilitation Plan of Care Note    Plan of Care  Care Plan Reviewed - No updates at this time.    Safety    Performed Intervention(s)  Items within reach  Safety rounds  Bed alarm/chair alarm      Psychosocial    Performed Intervention(s)  Medication  verbalies needs and concerns      Sphincter Control    Performed Intervention(s)  Therapeutic exercises/modalities  Elimination schedule  Encourage appropriate diet    Signed by: Cb Lopez RN

## 2023-11-08 PROCEDURE — 97112 NEUROMUSCULAR REEDUCATION: CPT

## 2023-11-08 PROCEDURE — 90791 PSYCH DIAGNOSTIC EVALUATION: CPT

## 2023-11-08 PROCEDURE — 97116 GAIT TRAINING THERAPY: CPT

## 2023-11-08 PROCEDURE — 97530 THERAPEUTIC ACTIVITIES: CPT

## 2023-11-08 PROCEDURE — 97129 THER IVNTJ 1ST 15 MIN: CPT

## 2023-11-08 PROCEDURE — 97110 THERAPEUTIC EXERCISES: CPT

## 2023-11-08 PROCEDURE — 97130 THER IVNTJ EA ADDL 15 MIN: CPT

## 2023-11-08 RX ADMIN — FOLIC ACID 1 MG: 1 TABLET ORAL at 09:18

## 2023-11-08 RX ADMIN — VILAZODONE HYDROCHLORIDE 40 MG: 40 TABLET, FILM COATED ORAL at 09:18

## 2023-11-08 RX ADMIN — DOCUSATE SODIUM 50MG AND SENNOSIDES 8.6MG 2 TABLET: 8.6; 5 TABLET, FILM COATED ORAL at 20:43

## 2023-11-08 RX ADMIN — HYDRALAZINE HYDROCHLORIDE 100 MG: 50 TABLET, FILM COATED ORAL at 21:13

## 2023-11-08 RX ADMIN — HYDRALAZINE HYDROCHLORIDE 100 MG: 50 TABLET, FILM COATED ORAL at 05:28

## 2023-11-08 RX ADMIN — HYDRALAZINE HYDROCHLORIDE 100 MG: 50 TABLET, FILM COATED ORAL at 14:52

## 2023-11-08 RX ADMIN — PANTOPRAZOLE SODIUM 40 MG: 40 TABLET, DELAYED RELEASE ORAL at 05:28

## 2023-11-08 RX ADMIN — TICAGRELOR 60 MG: 60 TABLET ORAL at 20:43

## 2023-11-08 RX ADMIN — ATORVASTATIN CALCIUM 80 MG: 80 TABLET, FILM COATED ORAL at 20:43

## 2023-11-08 RX ADMIN — Medication 10 MG: at 20:43

## 2023-11-08 RX ADMIN — CARVEDILOL 6.25 MG: 6.25 TABLET, FILM COATED ORAL at 17:46

## 2023-11-08 RX ADMIN — HYDROXYZINE HYDROCHLORIDE 50 MG: 25 TABLET ORAL at 04:37

## 2023-11-08 RX ADMIN — ASPIRIN 81 MG: 81 TABLET, COATED ORAL at 09:18

## 2023-11-08 RX ADMIN — MULTIPLE VITAMINS W/ MINERALS TAB 1 TABLET: TAB at 09:19

## 2023-11-08 RX ADMIN — LISINOPRIL 40 MG: 40 TABLET ORAL at 09:18

## 2023-11-08 RX ADMIN — Medication 100 MG: at 09:19

## 2023-11-08 RX ADMIN — TICAGRELOR 60 MG: 60 TABLET ORAL at 09:18

## 2023-11-08 RX ADMIN — CARVEDILOL 6.25 MG: 6.25 TABLET, FILM COATED ORAL at 09:19

## 2023-11-08 RX ADMIN — DOCUSATE SODIUM 50MG AND SENNOSIDES 8.6MG 2 TABLET: 8.6; 5 TABLET, FILM COATED ORAL at 09:19

## 2023-11-08 RX ADMIN — AMLODIPINE BESYLATE 5 MG: 5 TABLET ORAL at 09:19

## 2023-11-08 NOTE — PROGRESS NOTES
Inpatient Rehabilitation Plan of Care Note    Plan of Care  Care Plan Reviewed - No updates at this time.    Safety    Performed Intervention(s)  Items within reach  Safety rounds  Bed alarm/chair alarm      Psychosocial    Performed Intervention(s)  Medication  verbalies needs and concerns      Sphincter Control    Performed Intervention(s)  Therapeutic exercises/modalities  Elimination schedule  Encourage appropriate diet    Signed by: Lorena Vital RN

## 2023-11-08 NOTE — PROGRESS NOTES
NELI sat with patient in room this afternoon during his phone hearing for his unemployment at patient's request. Patient was anxious during the hearing but did well. Offered support to him.

## 2023-11-08 NOTE — PLAN OF CARE
Goal Outcome Evaluation:  Plan of Care Reviewed With: patient        Progress: improving  Outcome Evaluation: Pt is AOx4, pleasant and cooperative. Continues with double vision, cueing at all times. Meds whole with water. Continent B/B, last BM 11/07. Assist x1. No skin issues. SCDs refused. No unsafe behavior. Call light within reach.

## 2023-11-08 NOTE — PROGRESS NOTES
Inpatient Rehabilitation Plan of Care Note    Plan of Care  Care Plan Reviewed - Updates as Follows    Safety    [RN] Potential for Injury(Active)  Current Status(11/08/2023): Uses call light appropriately  Weekly Goal(11/14/2023): Cues to use call light  Discharge Goal: Items within reach    Performed Intervention(s)  Items within reach  Safety rounds  Bed alarm/chair alarm      Psychosocial    [RN] Behavior(Active)  Current Status(11/08/2023): Anxity related to hx of strokes and fall  Weekly Goal(11/14/2023): Allow opportunity to express concers regarding current  status.  Discharge Goal: Demonstrate healthy oping strategies.    Performed Intervention(s)  Medication  verbalies needs and concerns  suicidal precautions placed today      Sphincter Control    [RN] Bladder Management(Active)  Current Status(11/08/2023): continent 100%  Weekly Goal(11/14/2023): Continent 100%  Discharge Goal: Continent 100%    Performed Intervention(s)  Therapeutic exercises/modalities  Elimination schedule  Encourage appropriate diet    Signed by: Sherri Clark RN

## 2023-11-08 NOTE — PROGRESS NOTES
Patient had voiced to nursing staff last evening that he wanted to die.  Suicide precautions ordered.  He was seen by the access center and evaluated who noted that he does not have any suicidal ideation, does not pose a risk to himself, and that the suicide precautions can be discontinued.  Patient reviewed with nursing

## 2023-11-08 NOTE — THERAPY TREATMENT NOTE
Inpatient Rehabilitation - Physical Therapy Treatment Note       [facility]     Patient Name: Flako Coreas  : 1965  MRN: [id number]    Today's Date: 2023                    Admit Date: 2023      Visit Dx:   No diagnosis found.    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    Acute CVA (cerebrovascular accident)    HTN (hypertension)    Late effect of cerebrovascular accident (CVA)    Concussion    Fall    Head injury, closed, with concussion    Alcohol abuse       Past Medical History:   Diagnosis Date    ADHD (attention deficit hyperactivity disorder)     On going issue    Anxiety     Lepe's esophagus     Benign prostatic hyperplasia Surgery in 2021    Clotting disorder Intestinal    Depression     Diverticulitis     Diverticulosis     Duodenitis     Enteritis     Failure to thrive (child)     Family history of blood clots     GERD (gastroesophageal reflux disease)     Hyperlipidemia     Hypertension     Hypertensive emergency     Low testosterone     Microcytosis     Pancreatitis     Pneumonia     PONV (postoperative nausea and vomiting)     Seasonal affective disorder     Shoulder injury     Stroke     Unexplained weight loss        Past Surgical History:   Procedure Laterality Date    COLON SURGERY      COLONOSCOPY      COLONOSCOPY N/A 2022    Procedure: COLONOSCOPY INTO CECUM WITH HOT SNARE POLYPECTOMY;  Surgeon: Albert Gallo MD;  Location: [facility] ENDOSCOPY;  Service: Gastroenterology;  Laterality: N/A;  PRE- GI BLEED  POST- DIVERTICULOSIS, POLYP    ENDOSCOPY N/A 12/10/2022     Procedure: ESOPHAGOGASTRODUODENOSCOPY;  Surgeon: Albert Gallo MD;  Location: [facility] ENDOSCOPY;  Service: Gastroenterology;  Laterality: N/A;  PRE- DARK STOOLS  POST- BARRETTS ESOPHAGUS    FRACTURE SURGERY  1980    for broken arm    FRACTURE SURGERY      for broken hand    INCISION AND DRAINAGE ABSCESS  2015    anal    PROSTATE SURGERY      SMALL INTESTINE SURGERY      TONSILLECTOMY         PT ASSESSMENT (last 12 hours)       [facility] PT Evaluation and Treatment       Row Name 11/08/23 0857          PT Time and Intention    Document Type daily treatment  -DP     Mode of Treatment individual therapy;physical therapy  -DP     Patient/Family/Caregiver Comments/Observations Pt with sitter present throughout PT session  -DP       Row Name 11/08/23 0857          General Information    Patient Profile Reviewed yes  -DP     Existing Precautions/Restrictions fall  -DP       Row Name 11/08/23 0857          Pain Assessment    Pretreatment Pain Rating 0/10 - no pain  -DP     Posttreatment Pain Rating 0/10 - no pain  -DP       Row Name 11/08/23 0857          Cognition/Psychosocial    Affect/Mental Status (Cognition) WFL  -DP     Orientation Status (Cognition) oriented x 3  -DP     Follows Commands (Cognition) follows one-step commands;75-90% accuracy  -DP     Personal Safety Interventions safety round/check completed;elopement precautions initiated;fall prevention program maintained;gait belt;muscle strengthening facilitated;nonskid shoes/slippers when out of bed;supervised activity  -DP       Row Name 11/08/23 0857          Bed Mobility    Rolling Right Henry (Bed Mobility) supervision  -DP     Supine-Sit Henry (Bed Mobility) supervision  -DP       Row Name 11/08/23 0857          Bed-Chair Transfer    Bed-Chair Henry (Transfers) contact guard  -DP     Comment, (Bed-Chair Transfer) squat pivot  -DP       Row Name 11/08/23 0857          Sit-Stand Transfer    Sit-Stand Henry (Transfers) contact guard   -DP     Assistive Device (Sit-Stand Transfers) walker, front-wheeled  -DP       Row Name 11/08/23 0857          Stand-Sit Transfer    Stand-Sit Jersey (Transfers) contact guard  -DP     Assistive Device (Stand-Sit Transfers) walker, front-wheeled  -DP       Row Name 11/08/23 0857          Gait/Stairs (Locomotion)    Jersey Level (Gait) verbal cues;contact guard  -DP     Assistive Device (Gait) walker, front-wheeled  -DP     Distance in Feet (Gait) 80'x3, 160'x1  -DP     Deviations/Abnormal Patterns (Gait) gait speed decreased  -DP     Bilateral Gait Deviations forward flexed posture  -DP     Left Sided Gait Deviations foot drop/toe drag;heel strike decreased  -DP     Gait Assessment/Intervention pt did demonstrate narrow step width and even some scissoring at times mostly noted during turns but did occur with straight pathways too. Trialed FWW, c-lever and rollator with FWW being the most steady  -DP       Row Name 11/08/23 0857          Safety Issues, Functional Mobility    Impairments Affecting Function (Mobility) balance;coordination;strength  -DP       Row Name 11/08/23 0857          Balance    Comment, Balance Static standing in // bars with feet apart 3 sets 10-20 seconds with noted instability and posterior lean. Performed again with feet together for 4 sets ranging from 1-15 seconds with increased posterior LOB. Pt also performed weightshift 20x with 1UE support CGA/Natasha for all. Mirror use seem to help pt  -DP       Row Name 11/08/23 0857          Positioning and Restraints    Pre-Treatment Position in bed  -DP     Post Treatment Position wheelchair  -DP     In Wheelchair sitting;call light within reach;encouraged to call for assist;exit alarm on;with nsg  -DP               User Key  (r) = Recorded By, (t) = Taken By, (c) = Cosigned By      Initials Name Provider Type    Papo Renee PT Physical Therapist                     Physical Therapy Education       Title: PT OT SLP Therapies  (Done)       Topic: Physical Therapy (Done)       Point: Mobility training (Done)       Learning Progress Summary             Patient Acceptance, E,D, VU,DU by DP at 11/8/2023 1551                         Point: Home exercise program (Done)       Learning Progress Summary             Patient Acceptance, E,D, VU,DU by DP at 11/8/2023 1551                         Point: Body mechanics (Done)       Learning Progress Summary             Patient Acceptance, E,D, VU,DU by DP at 11/8/2023 1551                         Point: Precautions (Done)       Learning Progress Summary             Patient Acceptance, E,D, VU,DU by DP at 11/8/2023 1551    Acceptance, E, VU by MD at 11/7/2023 1605                                         User Key       Initials Effective Dates Name Provider Type Discipline    MD 06/16/21 -  Trinity Velez, PT Physical Therapist PT    DP 08/24/21 -  Papo Houston PT Physical Therapist PT                    PT Recommendation and Plan                          Time Calculation:      PT Charges       Row Name 11/08/23 1140 11/08/23 0900          Time Calculation    Start Time 1000  -DP 0830  -DP     Stop Time 1030  -DP 0900  -DP     Time Calculation (min) 30 min  -DP 30 min  -DP     PT Received On -- 11/08/23  -DP     PT - Next Appointment -- 11/09/23  -DP        Time Calculation- PT    Total Timed Code Minutes- PT 30 minute(s)  -DP 30 minute(s)  -DP               User Key  (r) = Recorded By, (t) = Taken By, (c) = Cosigned By      Initials Name Provider Type    DP Papo Houston, PT Physical Therapist                    Therapy Charges for Today       Code Description Service Date Service Provider Modifiers Qty    40058184009 HC GAIT TRAINING EA 15 MIN 11/8/2023 Papo Houston, PT GP 2    27675423856 HC PT NEUROMUSC RE EDUCATION EA 15 MIN 11/8/2023 Papo Houston, PT GP 2              PT G-Codes  AM-PAC 6 Clicks Score (PT): 23      Papo Houston PT  11/8/2023

## 2023-11-08 NOTE — PROGRESS NOTES
Inpatient Rehabilitation Functional Measures Assessment and Plan of Care    Plan of Care  Updated Problems/Interventions  Cognition    [ST] Attention(Active)  Current Status(11/08/2023): Mildly impaired attention and visuospatial skills on  CLQT. Moderately impaired memory.  Weekly Goal(11/16/2023): Will complete high-level mental manipulation tasks with  MIN-NO cues.  Discharge Goal: Improved attention skills for home environment.        Communication    [ST] Expression(Active)  Current Status(11/08/2023): Mild anomia  Weekly Goal(11/16/2023): Will complete word retrieval tasks with occasional MIN  cues.  Discharge Goal: Will participate in complex conversation with NO cues for  compensations.    Signed by: Yoselyn Gudino, SLP

## 2023-11-08 NOTE — PROGRESS NOTES
Inpatient Rehabilitation Plan of Care Note    Plan of Care  Updated Problems/Interventions  Mobility    [PT] Bed/Chair/Wheelchair(Active)  Current Status(11/08/2023): CGA  Weekly Goal(11/16/2023): SBA  Discharge Goal: SUP    [PT] Bed Mobility(Active)  Current Status(11/08/2023): SUP  Weekly Goal(11/16/2023): TABITHA  Discharge Goal: IND    [PT] Stairs(Active)  Current Status(11/08/2023): 4 CGA  Weekly Goal(11/16/2023): PT ONLY  Discharge Goal: 12 supervison    [PT] Walk(Active)  Current Status(11/08/2023): 160 FWW CGA  Weekly Goal(11/16/2023): PT ONLY  Discharge Goal: 160' with FWW and Supervision    Signed by: Papo Houston, PT

## 2023-11-08 NOTE — THERAPY TREATMENT NOTE
Inpatient Rehabilitation - Speech Language Pathology Treatment Note    [facility]     Patient Name: Flako Coreas  : 1965  MRN: [id number]    Today's Date: 2023                   Admit Date: 2023       Visit Dx:    No diagnosis found.    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    Acute CVA (cerebrovascular accident)    HTN (hypertension)    Late effect of cerebrovascular accident (CVA)    Concussion    Fall    Head injury, closed, with concussion    Alcohol abuse       Past Medical History:   Diagnosis Date    ADHD (attention deficit hyperactivity disorder)     On going issue    Anxiety     Lepe's esophagus     Benign prostatic hyperplasia Surgery in 2021    Clotting disorder Intestinal    Depression     Diverticulitis     Diverticulosis     Duodenitis     Enteritis     Failure to thrive (child)     Family history of blood clots     GERD (gastroesophageal reflux disease)     Hyperlipidemia     Hypertension     Hypertensive emergency     Low testosterone     Microcytosis     Pancreatitis     Pneumonia     PONV (postoperative nausea and vomiting)     Seasonal affective disorder     Shoulder injury     Stroke     Unexplained weight loss        Past Surgical History:   Procedure Laterality Date    COLON SURGERY      COLONOSCOPY      COLONOSCOPY N/A 2022    Procedure: COLONOSCOPY INTO CECUM WITH HOT SNARE POLYPECTOMY;  Surgeon: Albert Gallo MD;  Location: [facility] ENDOSCOPY;  Service: Gastroenterology;  Laterality: N/A;  PRE- GI BLEED  POST- DIVERTICULOSIS, POLYP    ENDOSCOPY N/A  "12/10/2022    Procedure: ESOPHAGOGASTRODUODENOSCOPY;  Surgeon: Albert Gallo MD;  Location: [facility] ENDOSCOPY;  Service: Gastroenterology;  Laterality: N/A;  PRE- DARK STOOLS  POST- BARRETTS ESOPHAGUS    FRACTURE SURGERY  1980    for broken arm    FRACTURE SURGERY      for broken hand    INCISION AND DRAINAGE ABSCESS  2015    anal    PROSTATE SURGERY      SMALL INTESTINE SURGERY      TONSILLECTOMY         SLP Recommendation and Plan                                                               SLP EVALUATION (last 72 hours)       SLP SLC Evaluation       Row Name 11/08/23 1330 11/08/23 1030 11/07/23 0830             Communication Assessment/Intervention    Document Type therapy note (daily note)  -AL therapy note (daily note)  -AL evaluation  -AL      Subjective Information -- -- no complaints  -AL      Patient Observations -- alert;cooperative  -AL alert;cooperative  -AL      Patient/Family/Caregiver Comments/Observations Pt participated well.  -AL Pt participated well. He became tearful regarding deficits x1. SLP provided support. Pt on suicide watch today; CNA present for session to monitor.  -AL Pt participated well. Seen upright in wheelchair in therapy office.  -AL      Patient Effort -- -- excellent  -AL      Symptoms Noted During/After Treatment -- -- none  -AL         General Information    Patient Profile Reviewed -- -- yes  -AL      Pertinent History Of Current Problem -- -- Pt is a 58-year-old male admitted to inpatient rehab under diagnosis \"s/p concussion\" with onset date 10/23/23. Pt with history of L cerebellar CVA and was hospitalized 8/16-8/24. Speech therapy evaluation on that admission found mild cognitive-communication deficit; however, pt reported he did not participate in outpatient speech therapy. MRI 10/26/23 showed \"5 mm focus of T2 shine through on the DWI images in the left cerebellum   consistent with old lacunar infarct is less prominent than on the   comparison study.  No acute " "infarcts are identified.  Mild scattered T2   hyperintensities consistent with chronic small vessel disease, unchanged.\" Pt with history of ADHD, anxiety. Reported prior to admission marijuana use and ETOH use (70 drinks per week per MD note). Pt reports difficulty with word retrieval, maintaining his train of thought, and recalling numbers. He reports symptoms have persisted since CVA in Aug 2023. He reported he attempted a job interview for a management position after CVA and was unable to communicate adequately during the interview.  -AL      Precautions/Limitations, Vision -- -- --  Reports some double vision  -AL      Precautions/Limitations, Hearing -- -- WFL;for purposes of eval  -AL      Patient Level of Education -- -- Bachelor's degree in Loss Prevention Management and Business. Worked previously managing retail stores.  -AL      Prior Level of Function-Communication -- -- WFL  prior to CVA in August  -AL      Plans/Goals Discussed with -- -- patient  -AL      Barriers to Rehab -- -- none identified  -AL      Patient's Goals for Discharge -- -- functional communication;functional cognition;return to all previous roles/activities  -AL         Pain Scale: Numbers Pre/Post-Treatment    Pretreatment Pain Rating 0/10 - no pain  -AL 0/10 - no pain  -AL 0/10 - no pain  -AL      Posttreatment Pain Rating -- -- 0/10 - no pain  -AL         Motor Speech Assessment/Intervention    Motor Speech, Comment -- -- No overt dysarthria observed in conversation. Occasional hesitations/dysfluencies noted.  -AL         Cognitive Assessment Intervention- SLP    Cognition, Comment -- -- BIMS: Pt was oriented to month, year and NOREEN. He recalled 2/3 unrelated words after 2 minute delay; MAX cues did not increase accuracy.  -AL         Standardized Tests    Formal Speech Language Tests -- -- Britton Naming  -AL      Cognitive/Memory Tests -- -- CLQT: Cognitive Linguistic Quick Test  -AL         Britton Naming    Correct Spontaneous " Responses -- -- 51  -AL      Stimulus Cues Given -- -- 1  -AL      Correct Responses Following Stimulus Cues -- -- 1  -AL      Phonemic Cues Given -- -- 8  -AL      Correct Responses Following Phonemic Cues -- -- 2  -AL      Total Score -- -- 51  -AL      Nettie Naming Comments -- -- BNT Norms for Adults 50-59: Mean 55.2, SD 4.0. Pt scored 51/60, indicating that he is 1 SD below the mean for his age. Pt presents with mild anomia in conversation. He independently utilizes circumlocution to compensate. Picture description from WAB assessment appeared WFL. Pt reports difficulty recalling specific information (i.e. name of a ), difficulty recalling specific numbers/percentages and difficulty maintaining his train of thought in conversation.  -AL         CLQT (The Cognitive Linguistic Quick Test)    Attention Domain Score -- -- 160  -AL      Attention Severity Rating -- -- 3: Mild  -AL      Memory Domain Score -- -- 138  -AL      Memory Severity Rating -- -- 2: Moderate  -AL      Executive Function Domain Score -- -- 29  -AL      Executive Function Severity Rating -- -- 4: WNL  -AL      Language Domain Score -- -- 30  -AL      Language Severity Rating -- -- 4: WNL  -AL      Visuospatial Domain Score -- -- 80  -AL      Visuospatial Severity Rating -- -- 3: Mild  -AL      Clock Drawing Total Score -- -- 11  -AL      Clock Drawing Severity Rating -- -- Mild  -AL      Composite Severity Rating -- -- 3.2  -AL      Composite Severity Rating Range -- -- 3.4 - 2.5: Mild  -AL      CLQT Comments -- -- Cognitive-Linguistic Quick Test (CLQT) administered. Pt scored an overall composite severity rating of 3.2/4.0, indicating a mild cognitive-communication deficit. He scored WNL in the domains of executive functions and language, mildly impaired in the domains of attention and visuospatial skills and moderately impaired in the domain of memory. Pt scored below the criterion cut-off scores for his age on symbol cancellation  (10/12), clock drawing (11/13) story retelling (4/10), and symbol trails (5/10). He exhibited adequate performance on stating personal facts, confrontation naming, generative naming, design memory, mazes and design generation. Pt exhibited impulsivity during assessment, often starting written tasks before clinician had finished instructions. Pt c/o reduced word retrieval and ability to maintain train of thought in conversation. Strunk Naming Test administered. Pt scored 51/60, which is 1 SD below the mean for his age group, indicating mild anomia. In additional informal testing, pt exhibited mild-moderately reduced verbal working memory and mildly reduced alternating attention. Recommend continued intensive inpatient speech therapy to improve cognitive-communication skills in order to improve pt's level of independence.  -AL         SLP Evaluation Clinical Impressions    SLP Diagnosis -- -- mild;cognitive-linguistic disorder  mild anomia  -AL      Rehab Potential/Prognosis -- -- good  -AL      [facility] [place] for Skilled Therapy Interventions Met -- -- yes  -AL      Functional Impact -- -- functional impact in social situations;needs 24 hour supervision;difficulty completing home management task;difficulty completing vocational tasks  -AL         Recommendations    Therapy Frequency (SLP SLC) -- -- 2 times/day;5 days per week  -AL      Predicted Duration Therapy Intervention (Days) -- -- until discharge  -AL      Anticipated Discharge Disposition (SLP) -- -- home with  services  -AL         Communication Treatment Objective and Progress Goals (SLP)    [facility]Gs -- -- Patient will demonstrate functional language skills for return to discharge environment;Patient will demonstrate functional cognitive-linguistic skills for return to discharge environment  -AL      Verbal Expression Treatment Objectives -- -- Verbal Expression Treatment Objectives (Group)  -AL      Cognitive Linguistic Treatment Objectives -- -- Cognitive  Linguistic Treatment Objectives (Group)  -AL         Patient will demonstrate functional language skills for return to discharge environment     [place] -- -- with use of compensatory strategies  -AL      Time frame -- -- by discharge  -AL         Patient will demonstrate functional cognitive-linguistic skills for return to discharge environment    [place] -- -- with use of compensatory strategies  -AL      Time frame -- -- by discharge  -AL         Verbal Expression Treatment Objectives    Word Retrieval Skills Selection -- -- word retrieval, SLP goal 1  -AL         Word Retrieval Skills Goal 1 (SLP)    Improve Word Retrieval Skills By Goal 1 (SLP) -- -- low frequency;responsive naming task;confrontational naming task;supplying an antonym;supplying a synonym;completing a divergent task;80%;independently (over 90% accuracy)  -AL      Time Frame (Word Retrieval Goal 1, SLP) -- -- 1 week  -AL         Cognitive Linguistic Treatment Objectives    Attention Selection -- -- attention, SLP goal 1  -AL      Memory Skills Selection -- -- memory skills, SLP goal 1  -AL      Functional Math Skills Selection -- -- functional math skills, SLP goal 1  -AL      Executive Function Skills Selection -- -- executive function skills, SLP goal 1  -AL         Attention Goal 1 (SLP)    Improve Attention by Goal 1 (SLP) -- -- complete divided attention task;complete selective attention task;90%;independently (over 90% accuracy)  -AL      Time Frame (Attention Goal 1, SLP) -- -- 1 week  -AL         Memory Skills Goal 1 (SLP)    Improve Memory Skills Through Goal 1 (SLP) -- -- recalling unrelated word lists with an imposed delay;listen to a paragraph and answer questions;repeat list in sequential order;recall details from a word list;use memory strategies;90%;independently (over 90% accuracy)  -AL      Time Frame (Memory Skills Goal 1, SLP) -- -- 1 week  -AL         Functional Math Skills Goal 1 (SLP)    Improve Functional Math  Skills Through Goal 1 (SLP) -- -- complete functional math task;90%;independently (over 90% accuracy)  -AL      Time Frame (Functional Math Skills Goal 1, SLP) -- -- 1 week  -AL         Executive Functional Skills Goal 1 (SLP)    Improve Executive Function Skills Goal 1 (SLP) -- -- identify strategies, strengths, limitations;home management activity;perform self-evaluation;90%;independently (over 90% accuracy)  -AL      Time Frame (Executive Function Skills Goal 1, SLP) -- -- 1 week  -AL                User Key  (r) = Recorded By, (t) = Taken By, (c) = Cosigned By      Initials Name Effective Dates    Yoselyn Shah, MS CCC-SLP 06/16/21 -                        EDUCATION    The patient has been educated in the following areas:       Cognitive Impairment Communication Impairment      SLP GOALS       Row Name 11/08/23 1330 11/08/23 1030 11/07/23 0830       Patient will demonstrate functional language skills for return to discharge environment     [place] -- -- with use of compensatory strategies  -AL    Time frame -- -- by discharge  -AL       Patient will demonstrate functional cognitive-linguistic skills for return to discharge environment    [place] -- -- with use of compensatory strategies  -AL    Time frame -- -- by discharge  -AL       Word Retrieval Skills Goal 1 (SLP)    Improve Word Retrieval Skills By Goal 1 (SLP) low frequency;responsive naming task;confrontational naming task;supplying an antonym;supplying a synonym;completing a divergent task;80%;independently (over 90% accuracy)  -AL low frequency;responsive naming task;confrontational naming task;supplying an antonym;supplying a synonym;completing a divergent task;80%;independently (over 90% accuracy)  -AL low frequency;responsive naming task;confrontational naming task;supplying an antonym;supplying a synonym;completing a divergent task;80%;independently (over 90% accuracy)  -AL    Time Frame (Word Retrieval Goal 1, SLP) 1 week  -AL 1  week  -AL 1 week  -AL    Progress/Outcomes (Word Retrieval Goal 1, SLP) goal ongoing  -AL goal ongoing  -AL --    Comment (Word Retrieval Goal 1, SLP) Responsive naming for 7 letter words given definition: 16/18 with NO cues, 18/18 with MIN-MOD cues.  -AL Responsive naming for high-level vocabulary given ending (ine): 60% with NO cues, 80% with MIN cues, 90% with MOD cues.  -AL --       Attention Goal 1 (SLP)    Improve Attention by Goal 1 (SLP) -- -- complete divided attention task;complete selective attention task;90%;independently (over 90% accuracy)  -AL    Time Frame (Attention Goal 1, SLP) -- -- 1 week  -AL    Comment (Attention Goal 1, SLP) -- Discussed impact of frustration/anxiety on concentration and word retrieval. Pt reported he does not feel anxious during cognitive tasks, but notes when he is frustrated he loses his train of thought.  Discussed impact of relaxation techniques (deep breathing) on assisting with concentration. Plan to discuss further in next session.  -AL --       Memory Skills Goal 1 (SLP)    Improve Memory Skills Through Goal 1 (SLP) -- recalling unrelated word lists with an imposed delay;listen to a paragraph and answer questions;repeat list in sequential order;recall details from a word list;use memory strategies;90%;independently (over 90% accuracy)  -AL recalling unrelated word lists with an imposed delay;listen to a paragraph and answer questions;repeat list in sequential order;recall details from a word list;use memory strategies;90%;independently (over 90% accuracy)  -AL    Time Frame (Memory Skills Goal 1, SLP) -- 1 week  -AL 1 week  -AL    Progress/Outcomes (Memory Skills Goal 1, SLP) -- goal ongoing  -AL --    Comment (Memory Skills Goal 1, SLP) -- Working memory for 4 unit list retention task: 80% with NO cues, 100% with MIN cues. 5 unit list retention: 40% with NO cues, 60% with MIN cues, 100% with MOD-MAX cues. Pt benefited from use of categorizing strategy. Immediate  memory for voicemails: 60% with one repetition, 80% with 2 repetitions, 100% with 3 repetitions of stimulus.  -AL --       Functional Math Skills Goal 1 (SLP)    Improve Functional Math Skills Through Goal 1 (SLP) complete functional math task;90%;independently (over 90% accuracy)  -AL -- complete functional math task;90%;independently (over 90% accuracy)  -AL    Time Frame (Functional Math Skills Goal 1, SLP) 1 week  -AL -- 1 week  -AL    Progress/Outcomes (Functional Math Skills Goal 1, SLP) good progress toward goal  -AL -- --    Comment (Functional Math Skills Goal 1, SLP) Grocery prices task: 80% with NO cues, 100% with MIN cues. Moderately complex temporal problem solving (auditory): 50% with NO cues, 90% with repetition of stimulus, 100% with MIN cues.  -AL -- --       Executive Functional Skills Goal 1 (SLP)    Improve Executive Function Skills Goal 1 (SLP) -- -- identify strategies, strengths, limitations;home management activity;perform self-evaluation;90%;independently (over 90% accuracy)  -AL    Time Frame (Executive Function Skills Goal 1, SLP) -- -- 1 week  -AL    Comment (Executive Function Skills Goal 1, SLP) -- SLP reviewed results of CLQT from yesterday. Discussed treatment plan. Pt reported feeling frustrated with deficits.  -AL --              User Key  (r) = Recorded By, (t) = Taken By, (c) = Cosigned By      Initials Name Provider Type    Yoselyn Shah MS CCC-SLP Speech and Language Pathologist                                Time Calculation:        Time Calculation- SLP       Row Name 11/08/23 1416 11/08/23 1117          Time Calculation- SLP    SLP Start Time 1330  -AL 1030  -AL     SLP Stop Time 1400  -AL 1100  -AL     SLP Time Calculation (min) 30 min  -AL 30 min  -AL     SLP Received On 11/08/23  -AL 11/08/23  -AL               User Key  (r) = Recorded By, (t) = Taken By, (c) = Cosigned By      Initials Name Provider Type    Yoselyn Shah MS CCC-SLP Speech and Language  Pathologist                      Therapy Charges for Today       Code Description Service Date Service Provider Modifiers Qty    25681326690 HC ST STD COG PERF TEST PER HOUR 11/7/2023 Yoselyn Gudino, MS CCC-SLP GN 1    47837710538 HC ST DEV OF COGN SKILLS INITIAL 15 MIN 11/8/2023 Yoselyn Gudino, MS CCC-SLP  1    [id number] HC ST DEV OF COGN SKILLS EACH ADDT'L 15 MIN 11/8/2023 Yoselyn Gudino, MS CCC-SLP  3                             Yoselyn Gudino MS CCC-SLP  11/8/2023

## 2023-11-08 NOTE — THERAPY TREATMENT NOTE
Inpatient Rehabilitation - Occupational Therapy Treatment Note    [facility]     Patient Name: Flako Coreas  : 1965  MRN: [id number]    Today's Date: 2023                 Admit Date: 2023       No diagnosis found.    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    Acute CVA (cerebrovascular accident)    HTN (hypertension)    Late effect of cerebrovascular accident (CVA)    Concussion    Fall    Head injury, closed, with concussion    Alcohol abuse       Past Medical History:   Diagnosis Date    ADHD (attention deficit hyperactivity disorder)     On going issue    Anxiety     Lepe's esophagus     Benign prostatic hyperplasia Surgery in 2021    Clotting disorder Intestinal    Depression     Diverticulitis     Diverticulosis     Duodenitis     Enteritis     Failure to thrive (child)     Family history of blood clots     GERD (gastroesophageal reflux disease)     Hyperlipidemia     Hypertension     Hypertensive emergency     Low testosterone     Microcytosis     Pancreatitis     Pneumonia     PONV (postoperative nausea and vomiting)     Seasonal affective disorder     Shoulder injury     Stroke     Unexplained weight loss        Past Surgical History:   Procedure Laterality Date    COLON SURGERY      COLONOSCOPY      COLONOSCOPY N/A 2022    Procedure: COLONOSCOPY INTO CECUM WITH HOT SNARE POLYPECTOMY;  Surgeon: Albert Gallo MD;  Location: [facility] ENDOSCOPY;  Service: Gastroenterology;  Laterality: N/A;  PRE- GI BLEED  POST- DIVERTICULOSIS, POLYP    ENDOSCOPY N/A 12/10/2022    Procedure:  ESOPHAGOGASTRODUODENOSCOPY;  Surgeon: Albert Gallo MD;  Location: [facility] ENDOSCOPY;  Service: Gastroenterology;  Laterality: N/A;  PRE- DARK STOOLS  POST- BARRETTS ESOPHAGUS    FRACTURE SURGERY  1980    for broken arm    FRACTURE SURGERY      for broken hand    INCISION AND DRAINAGE ABSCESS  2015    anal    PROSTATE SURGERY      SMALL INTESTINE SURGERY      TONSILLECTOMY               IRF OT ASSESSMENT FLOWSHEET (last 12 hours)       IRF OT Evaluation and Treatment       Row Name 11/08/23 1541          OT Time and Intention    Document Type daily treatment  -CC     Mode of Treatment occupational therapy  -CC     Patient Effort excellent  -CC     Symptoms Noted During/After Treatment none  -CC       Row Name 11/08/23 1541          Pain Assessment    Pretreatment Pain Rating 0/10 - no pain  -CC     Posttreatment Pain Rating 0/10 - no pain  -CC       Row Name 11/08/23 1541          Cognition/Psychosocial    Affect/Mental Status (Cognition) anxious  -CC     Orientation Status (Cognition) oriented x 3  -CC     Follows Commands (Cognition) follows one-step commands;75-90% accuracy  -CC     Personal Safety Interventions fall prevention program maintained;gait belt;nonskid shoes/slippers when out of bed  -CC       Row Name 11/08/23 1541          Vision Assessment/Intervention    Visual Impairment/Limitations diplopia  -CC       Row Name 11/08/23 1541          Basic Activities of Daily Living (BADLs)    Additional Documentation --  refused ADLs. Pt too anxious, difficulty focusing. Offered x 2  -CC       Row Name 11/08/23 1541          Grooming    [place] Level (Grooming) grooming skills;oral care regimen;standby assist  -CC     Position (Grooming) sink side;supported sitting  -CC     Set-up Assistance (Grooming) obtain supplies  -CC       Row Name 11/08/23 1541          Bed Mobility    Comment, (Bed Mobility) in w/c  -CC       Row Name 11/08/23 1541          Sit-Stand Transfer    Sit-Stand [place] (Transfers)  minimum assist (75% patient effort)  -     Assistive Device (Sit-Stand Transfers) walker, front-wheeled  -       Row Name 11/08/23 1541          Stand-Sit Transfer    Stand-Sit [place] (Transfers) contact guard;minimum assist (75% patient effort)  -     Assistive Device (Stand-Sit Transfers) walker, front-wheeled  -       Row Name 11/08/23 1541          Motor Skills    Coordination fine motor deficit;upper extremity  -     Motor Control/Coordination Interventions fine motor manipulation/dexterity activities  pt able to manipulate and place small pegs in peg board and follow pattern card w/o assist  -       Row Name 11/08/23 1541          Balance    Static Standing Balance contact guard;minimal assist  -     Dynamic Standing Balance minimal assist;moderate assist  -     Balance Interventions standing;dynamic;static;supported;minimal challenge;weight shifting activity;UE activity with balance activity  Pt stood at counter w min assist w reaching tasks. Pt LOB x 3 when reaching across midline. Pt required mod assist w attempting to step and throw bean bags. Pt perspiring and very anxious w all activities  -       Row Name 11/08/23 1541          Positioning and Restraints    Pre-Treatment Position sitting in chair/recliner  -     Post Treatment Position wheelchair  -     In Wheelchair sitting;exit alarm on;with PT;with nsg  -CC               User Key  (r) = Recorded By, (t) = Taken By, (c) = Cosigned By      Initials Name Effective Dates    CC Rosa Chris, OTR 06/16/21 -                      Occupational Therapy Education       Title: PT OT SLP Therapies (In Progress)       Topic: Occupational Therapy (Done)       Point: ADL training (Done)       Description:   Instruct learner(s) on proper safety adaptation and remediation techniques during self care or transfers.   Instruct in proper use of assistive devices.                  Learning Progress Summary             Patient Acceptance, E,  VU by  at 11/7/2023 1649    Comment: Explanation of OT services, evaluation results and goal setting                         Point: Home exercise program (Done)       Description:   Instruct learner(s) on appropriate technique for monitoring, assisting and/or progressing therapeutic exercises/activities.                  Learning Progress Summary             Patient Acceptance, E, VU by  at 11/7/2023 1649    Comment: Explanation of OT services, evaluation results and goal setting                         Point: Precautions (Done)       Description:   Instruct learner(s) on prescribed precautions during self-care and functional transfers.                  Learning Progress Summary             Patient Acceptance, E, VU by  at 11/7/2023 1649    Comment: Explanation of OT services, evaluation results and goal setting                         Point: Body mechanics (Done)       Description:   Instruct learner(s) on proper positioning and spine alignment during self-care, functional mobility activities and/or exercises.                  Learning Progress Summary             Patient Acceptance, E, VU by  at 11/7/2023 1649    Comment: Explanation of OT services, evaluation results and goal setting                                         User Key       Initials Effective Dates Name Provider Type Discipline     06/16/21 -  Rosa Chris OTR Occupational Therapist OT                        OT Recommendation and Plan    Planned Therapy Interventions (OT): activity tolerance training, adaptive equipment training, BADL retraining, functional balance retraining, neuromuscular control/coordination retraining, strengthening exercise, transfer/mobility retraining, patient/caregiver education/training                    Time Calculation:      Time Calculation- OT       Row Name 11/08/23 0900             Time Calculation- OT    OT Start Time 0900  -      OT Stop Time 1000  -      OT Time Calculation (min) 60 min  -                 User Key  (r) = Recorded By, (t) = Taken By, (c) = Cosigned By      Initials Name Provider Type    CC Rosa Chris OTR Occupational Therapist                  Therapy Charges for Today       Code Description Service Date Service Provider Modifiers Qty    83588807615 HC OT EVAL MOD COMPLEXITY 2 11/7/2023 Rosa Chris OTR GO 1    63794136818 HC OT SELF CARE/MGMT/TRAIN EA 15 MIN 11/7/2023 Rosa Chris OTR GO 3    59934622770 HC OT THERAPEUTIC ACT EA 15 MIN 11/8/2023 Rosa Chris OTR GO 2    28328429092 HC OT NEUROMUSC RE EDUCATION EA 15 MIN 11/8/2023 Rosa Chris OTR GO 1    69688197565 HC OT THER PROC EA 15 MIN 11/8/2023 Rosa Chris OTR GO 1                     BEATA Mejia  11/8/2023

## 2023-11-08 NOTE — PLAN OF CARE
Goal Outcome Evaluation:  Plan of Care Reviewed With: patient           Outcome Evaluation: Flako was tearful this morning and verbalized suicidal ideation to night shift nurse so sitter was initiated and suicide precautions followed per policy. [facility] consulted and interviewed patient. Patient removed from precautions after lunch, per [facility] and with Dr Snow order. Patient contracts for safety He is alert and oriented x4, anxious at times, and assist x1. He takes medication with water, continent of b/b, and double vision- eye patch ordered. N/T to left face, tongue, and hand, no unsafe behavior, and no complaints of pain. He tolerated all therapies, unemployment call completed today with - awaiting results.

## 2023-11-08 NOTE — CONSULTS
"[facility] consult d/t SI. Pt had recent stroke on rehab unit. RN reports pt told overnight RN he wished to be dead, worried about future and overwhelmed. Pt placed on SI precautions with sitter. [facility] states dc plan is to \"return home to his apartment, pt's son is going to go on FMLA and be there 24/7\".     Pt A&Ox4. Sitter left room for evaluation. Pt tearful throughout, but appropriate to situation. Pt rates both anxiety and depression \"9/10\" today (10 being worst on both scales). Pt denies SI/HI, hallucinations or wish to be dead. Pt and this writer discussed finality or death, pt adamant his statement to the overnight RN was not meant with intent to end his life. Pt states, \"I'm scared about the future and what is going to happen, I could never hurt myself ever, or leave my son like that. It would devastate him\". Pt denies issues with sleep or appetite. Current stressors: recovering from stroke, unemployed, finances.     MENTAL HEALTH HX: Pt prescribed Viibryd and Atarax by PCP. Pt hx of counseling 16 years ago when he went through his divorce. Denies hx of abuse/neglect/trauma/violence/inpatient psychiatric hospitalizations. Pt seen by [facility] and psychiatry in September 2023 after stroke for anxiety. Pt denies family hx.     SUBSTANCE USE HX: Pt reports quit ETOH and THC in August. Denies heavy use of either previously. Denies CYNDEE tx of any kind in the past. Denies family hx.     SOCIAL: Pt is a 57 yo D/W/M. Pt has one adult son. Pt lives in apartment alone, reports safe environment. Pt unemployed currently and looking for work. Pt has bachelor's degree. Pt is of [employer]. Pt enjoys making wood models, backpacking, hiking, golfing and cooking. Pt feels supported by son (emergency contact), aunt and friend.     PLAN: Pt and this writer spoke in depth r/t pt's emotions and situation in with his housing and finances. Pt has phone call today with unemployment office r/t possible financial aid assistance, " "which if granted will by him 1.5 months in saving. Pt discussed feeling overwhelmed w/ his recovery; however feels he is strong and determined to \"get better\". Pt states he is able to keep himself safe in hospital and at home once discharge \"without a shadow of doubt\". Pt has good protective factors and is convincing. Pt has outpatient counseling resources previously provided by [facility] and discussed will follow-up and schedule telehealth until able to go in-person. Pt declines psychiatry consult \"the last time I was here the attending MD would not follow psychiatry medication recommendations and put me in the middle of it and I got upset and slammed my hand down on the table and was escorted out by security, my meds are fine for now\". Gave RN update.   [facility] following.   "

## 2023-11-08 NOTE — PROGRESS NOTES
Inpatient Rehabilitation Functional Measures Assessment and Plan of Care    Plan of Care  Updated Problems/Interventions  Self Care    [OT] Bathing(Active)  Current Status(11/08/2023): MIN/CGA  Weekly Goal(11/15/2023): CGA/SBA  Discharge Goal: SBA/Mod I    [OT] Dressing (Lower)(Active)  Current Status(11/08/2023): MIn  Weekly Goal(11/15/2023): CGA  Discharge Goal: SBa/Mod I    [OT] Dressing (Upper)(Active)  Current Status(11/08/2023): SBA  Weekly Goal(11/15/2023): set up/Mod I  Discharge Goal: set up/Mod I    [OT] Eating(Active)  Current Status(11/08/2023): I  Weekly Goal(11/15/2023): I  Discharge Goal: I    [OT] Grooming(Active)  Current Status(11/08/2023): SBA  Weekly Goal(11/15/2023): set up/Mod I  Discharge Goal: Set up/Mod I    [OT] Toileting(Active)  Current Status(11/08/2023): MIn  Weekly Goal(11/15/2023): CGA  Discharge Goal: MOd I        Mobility    [OT] Toilet Transfers(Active)  Current Status(11/08/2023): MIn  Weekly Goal(11/15/2023): CGA  Discharge Goal: SBA/Mod I    [OT] Tub/Shower Transfers(Active)  Current Status(11/08/2023): MIn  Weekly Goal(11/15/2023): CGA  Discharge Goal: SBa/Mod I    Functional Measures  CAROL Eating:  Branch  CAROL Grooming: Branch  CAROL Bathing:  Branch  CAROL Upper Body Dressing:  Branch  CAROL Lower Body Dressing:  Branch  CAROL Toileting:  Branch    CAROL Bladder Management  Level of Assistance:  Branch  Frequency/Number of Accidents this Shift:  Branch    CAROL Bowel Management  Level of Assistance: Branch  Frequency/Number of Accidents this Shift: Branch    CAROL Bed/Chair/Wheelchair Transfer:  Branch  CAROL Toilet Transfer:  Branch  CAROL Tub/Shower Transfer:  Branch    Previously Documented Mode of Locomotion at Discharge: Field  CAROL Expected Mode of Locomotion at Discharge: Branch  CAROL Walk/Wheelchair:  Branch  CAROL Stairs:  Branch    CAROL Comprehension:  Branch  CAROL Expression:  Branch  CAROL Social Interaction:  Branch  CAROL Problem Solving:  Branch  CAROL Memory:  Branch    Therapy Mode  Minutes  Occupational Therapy: Branch  Physical Therapy: Branch  Speech Language Pathology:  Branch    Signed by: BEATA Mejia/SAMMY

## 2023-11-08 NOTE — PROGRESS NOTES
LOS: 2 days   Patient Care Team:  Akash Rodriguez Jr., DO as PCP - General (Sports Medicine)      NIMESH ROCHA  1965      ADMITTING DIAGNOSIS:    Status post concussion  History of CVA-aspirin/Brilinta/statin    Subjective     He is working on his balance and visual perceptual activities in therapy.  He notes that he has some intermittent diplopia.  Not able to describe it as clearly in one particular direction but images appear a little bit off.  Discussed may do a trial with a alternate eye patch if he needs monocular vision to focus on a particular activity but also needs to work on the eyes together for binocular vision.  Discussed getting him set up with outpatient neuro optometry evaluation      Objective     Vitals:    11/08/23 1252   BP: 141/91   Pulse: 68   Resp: 18   Temp: 99.2 °F (37.3 °C)   SpO2: 96%       Intake/Output Summary (Last 24 hours) at 11/8/2023 1414  Last data filed at 11/8/2023 1231  Gross per 24 hour   Intake 1080 ml   Output 850 ml   Net 230 ml     PHYSICAL EXAM:      MENTAL STATUS -  AWAKE / ALERT  HEENT-     LUNGS - CTA, NO WHEEZES, RALES OR RHONCHI  HEART- RRR, NO RUB, MURMUR, OR GALLOP  ABD - NORMOACTIVE BOWEL SOUNDS, SOFT, NT.    EXT - NO EDEMA OR CYANOSIS  NEURO -oriented x4  MOTOR EXAM - RUE/RLE 5/5. LUE/LLE 5/5.      MEDICATIONS  Scheduled Meds:amLODIPine, 5 mg, Oral, Daily  aspirin, 81 mg, Oral, Daily  atorvastatin, 80 mg, Oral, Nightly  carvedilol, 6.25 mg, Oral, BID With Meals  folic acid, 1 mg, Oral, Daily  hydrALAZINE, 100 mg, Oral, Q8H  lisinopril, 40 mg, Oral, Daily  melatonin, 10 mg, Oral, Nightly  multivitamin with minerals, 1 tablet, Oral, Daily  pantoprazole, 40 mg, Oral, Q AM  senna-docusate sodium, 2 tablet, Oral, BID  thiamine, 100 mg, Oral, Daily  ticagrelor, 60 mg, Oral, BID  vilazodone, 40 mg, Oral, Daily      Continuous Infusions:   PRN Meds:.  acetaminophen    senna-docusate sodium **AND** polyethylene glycol **AND** bisacodyl **AND** bisacodyl    " hydrOXYzine      RESULTS  No results found for: \"POCGLU\"  Results from last 7 days   Lab Units 11/06/23  0607 11/05/23  0438 11/04/23 0615   WBC 10*3/mm3 6.40 6.27 7.32   HEMOGLOBIN g/dL 11.5* 11.9* 12.1*   HEMATOCRIT % 34.7* 35.8* 36.9*   PLATELETS 10*3/mm3 223 238 242     Results from last 7 days   Lab Units 11/06/23  0607 11/05/23 0438 11/04/23 0615   SODIUM mmol/L 141 143 145   POTASSIUM mmol/L 3.5 3.6 3.6   CHLORIDE mmol/L 106 107 109*   CO2 mmol/L 26.8 26.0 24.6   BUN mg/dL 12 14 13   CREATININE mg/dL 0.99 1.07 0.95   CALCIUM mg/dL 8.7 8.8 9.0   BILIRUBIN mg/dL 0.4  --   --    ALK PHOS U/L 61  --   --    ALT (SGPT) U/L 21  --   --    AST (SGOT) U/L 13  --   --    GLUCOSE mg/dL 96 101* 99           ASSESSMENT and PLAN    Concussion    Status post concussion     History of CVA-aspirin/Brilinta/statin     Impaired mobility  Impaired self-care  Impaired cognition    Visual kitjowqggt-grurjexx-ikajeyyzg eye patch.  May possibly benefit from neuro optometry evaluation as an outpatient     Hypertension-amlodipine/carvedilol/hydralazine/lisinopril     Hyperlipidemia     Gastroesophageal reflux disease     Depression-Viibryd     DVT prophylaxis-on dual antiplatelet therapy.  SCDs     GI bleed-evaluated by gastroenterology-Plan is to wait 3 months before he can come off of Brilinta to undergo possible colonoscopy.        Goal is for home with home health   therapies.  Barrier to discharge:.  Mobility and self-care- work on strength balance transfers gait actives of daily living to overcome.          Pk Snow MD      Appropriate PPE was worn during the entire visit.  Hand hygiene was completed before and after.      "

## 2023-11-09 LAB
ANION GAP SERPL CALCULATED.3IONS-SCNC: 11.1 MMOL/L (ref 5–15)
BASOPHILS # BLD AUTO: 0.04 10*3/MM3 (ref 0–0.2)
BASOPHILS NFR BLD AUTO: 0.5 % (ref 0–1.5)
BUN SERPL-MCNC: 12 MG/DL (ref 6–20)
BUN/CREAT SERPL: 12.8 (ref 7–25)
CALCIUM SPEC-SCNC: 9.4 MG/DL (ref 8.6–10.5)
CHLORIDE SERPL-SCNC: 107 MMOL/L (ref 98–107)
CO2 SERPL-SCNC: 25.9 MMOL/L (ref 22–29)
CREAT SERPL-MCNC: 0.94 MG/DL (ref 0.76–1.27)
DEPRECATED RDW RBC AUTO: 43.7 FL (ref 37–54)
EGFRCR SERPLBLD CKD-EPI 2021: 95.1 ML/MIN/1.73
EOSINOPHIL # BLD AUTO: 0.47 10*3/MM3 (ref 0–0.4)
EOSINOPHIL NFR BLD AUTO: 5.7 % (ref 0.3–6.2)
ERYTHROCYTE [DISTWIDTH] IN BLOOD BY AUTOMATED COUNT: 14.4 % (ref 12.3–15.4)
GLUCOSE SERPL-MCNC: 109 MG/DL (ref 65–99)
HCT VFR BLD AUTO: 39.3 % (ref 37.5–51)
HGB BLD-MCNC: 13.2 G/DL (ref 13–17.7)
IMM GRANULOCYTES # BLD AUTO: 0.01 10*3/MM3 (ref 0–0.05)
IMM GRANULOCYTES NFR BLD AUTO: 0.1 % (ref 0–0.5)
LYMPHOCYTES # BLD AUTO: 1.73 10*3/MM3 (ref 0.7–3.1)
LYMPHOCYTES NFR BLD AUTO: 21.1 % (ref 19.6–45.3)
MCH RBC QN AUTO: 28 PG (ref 26.6–33)
MCHC RBC AUTO-ENTMCNC: 33.6 G/DL (ref 31.5–35.7)
MCV RBC AUTO: 83.4 FL (ref 79–97)
MONOCYTES # BLD AUTO: 0.53 10*3/MM3 (ref 0.1–0.9)
MONOCYTES NFR BLD AUTO: 6.5 % (ref 5–12)
NEUTROPHILS NFR BLD AUTO: 5.41 10*3/MM3 (ref 1.7–7)
NEUTROPHILS NFR BLD AUTO: 66.1 % (ref 42.7–76)
NRBC BLD AUTO-RTO: 0 /100 WBC (ref 0–0.2)
PLATELET # BLD AUTO: 278 10*3/MM3 (ref 140–450)
PMV BLD AUTO: 10 FL (ref 6–12)
POTASSIUM SERPL-SCNC: 3.5 MMOL/L (ref 3.5–5.2)
RBC # BLD AUTO: 4.71 10*6/MM3 (ref 4.14–5.8)
SODIUM SERPL-SCNC: 144 MMOL/L (ref 136–145)
WBC NRBC COR # BLD: 8.19 10*3/MM3 (ref 3.4–10.8)

## 2023-11-09 PROCEDURE — 97129 THER IVNTJ 1ST 15 MIN: CPT

## 2023-11-09 PROCEDURE — 97110 THERAPEUTIC EXERCISES: CPT | Performed by: OCCUPATIONAL THERAPIST

## 2023-11-09 PROCEDURE — 97116 GAIT TRAINING THERAPY: CPT

## 2023-11-09 PROCEDURE — 97110 THERAPEUTIC EXERCISES: CPT

## 2023-11-09 PROCEDURE — 80048 BASIC METABOLIC PNL TOTAL CA: CPT | Performed by: PHYSICAL MEDICINE & REHABILITATION

## 2023-11-09 PROCEDURE — 85025 COMPLETE CBC W/AUTO DIFF WBC: CPT | Performed by: PHYSICAL MEDICINE & REHABILITATION

## 2023-11-09 PROCEDURE — 97130 THER IVNTJ EA ADDL 15 MIN: CPT

## 2023-11-09 PROCEDURE — 97112 NEUROMUSCULAR REEDUCATION: CPT | Performed by: OCCUPATIONAL THERAPIST

## 2023-11-09 PROCEDURE — 97535 SELF CARE MNGMENT TRAINING: CPT | Performed by: OCCUPATIONAL THERAPIST

## 2023-11-09 RX ORDER — HYDROCHLOROTHIAZIDE 12.5 MG/1
12.5 TABLET ORAL DAILY
Status: DISPENSED | OUTPATIENT
Start: 2023-11-09

## 2023-11-09 RX ORDER — POTASSIUM CHLORIDE 750 MG/1
10 TABLET, FILM COATED, EXTENDED RELEASE ORAL DAILY
Status: DISPENSED | OUTPATIENT
Start: 2023-11-09

## 2023-11-09 RX ADMIN — FOLIC ACID 1 MG: 1 TABLET ORAL at 08:09

## 2023-11-09 RX ADMIN — HYDRALAZINE HYDROCHLORIDE 100 MG: 50 TABLET, FILM COATED ORAL at 06:02

## 2023-11-09 RX ADMIN — POTASSIUM CHLORIDE 10 MEQ: 750 TABLET, EXTENDED RELEASE ORAL at 18:18

## 2023-11-09 RX ADMIN — VILAZODONE HYDROCHLORIDE 40 MG: 40 TABLET, FILM COATED ORAL at 08:09

## 2023-11-09 RX ADMIN — HYDRALAZINE HYDROCHLORIDE 100 MG: 50 TABLET, FILM COATED ORAL at 14:18

## 2023-11-09 RX ADMIN — CARVEDILOL 6.25 MG: 6.25 TABLET, FILM COATED ORAL at 18:13

## 2023-11-09 RX ADMIN — DOCUSATE SODIUM 50MG AND SENNOSIDES 8.6MG 2 TABLET: 8.6; 5 TABLET, FILM COATED ORAL at 20:43

## 2023-11-09 RX ADMIN — HYDROXYZINE HYDROCHLORIDE 50 MG: 25 TABLET ORAL at 14:18

## 2023-11-09 RX ADMIN — TICAGRELOR 60 MG: 60 TABLET ORAL at 08:09

## 2023-11-09 RX ADMIN — Medication 100 MG: at 08:09

## 2023-11-09 RX ADMIN — CARVEDILOL 6.25 MG: 6.25 TABLET, FILM COATED ORAL at 08:09

## 2023-11-09 RX ADMIN — LISINOPRIL 40 MG: 40 TABLET ORAL at 08:09

## 2023-11-09 RX ADMIN — ATORVASTATIN CALCIUM 80 MG: 80 TABLET, FILM COATED ORAL at 20:43

## 2023-11-09 RX ADMIN — HYDROCHLOROTHIAZIDE 12.5 MG: 12.5 TABLET ORAL at 18:13

## 2023-11-09 RX ADMIN — MULTIPLE VITAMINS W/ MINERALS TAB 1 TABLET: TAB at 08:09

## 2023-11-09 RX ADMIN — TICAGRELOR 60 MG: 60 TABLET ORAL at 20:43

## 2023-11-09 RX ADMIN — ASPIRIN 81 MG: 81 TABLET, COATED ORAL at 08:11

## 2023-11-09 RX ADMIN — AMLODIPINE BESYLATE 5 MG: 5 TABLET ORAL at 08:09

## 2023-11-09 RX ADMIN — PANTOPRAZOLE SODIUM 40 MG: 40 TABLET, DELAYED RELEASE ORAL at 06:02

## 2023-11-09 RX ADMIN — Medication 10 MG: at 20:43

## 2023-11-09 NOTE — PROGRESS NOTES
Inpatient Rehabilitation Plan of Care Note    Plan of Care  Care Plan Reviewed - No updates at this time.    Safety    Performed Intervention(s)  Items within reach  Safety rounds  Bed alarm/chair alarm      Psychosocial    Performed Intervention(s)  Medication  verbalies needs and concerns  suicidal precautions placed today      Sphincter Control    Performed Intervention(s)  Therapeutic exercises/modalities  Elimination schedule  Encourage appropriate diet    Signed by: Sherri Clark RN

## 2023-11-09 NOTE — NURSING NOTE
"[facility] follow-up d/t SI.    Patient RIB. The patient reported he was feeling better and had a \"good\" sleep. The patient denied SI. The patient stated he was frustrated the other day and was not feeling suicidal. The patient acknowledged receiving resources from access Center and denied any questions. The patient denied any need for [facility] follow-up visits. [facility] will sign off. Patient encouraged to reach out to staff if he needs [facility] to return.   "

## 2023-11-09 NOTE — PROGRESS NOTES
" LOS: 3 days   Patient Care Team:  Akash Rodriguez Jr., DO as PCP - General (Sports Medicine)      NIMESH ROCHA  1965      ADMITTING DIAGNOSIS:    Status post concussion  History of CVA-aspirin/Brilinta/statin    Subjective     He notes he still has some expressive language deficits.  Reports that the alternate eye patch helps with his focus.  He is set up with neuro optometry appointment as an outpatient on December 5.      Objective     Vitals:    11/09/23 1409   BP: 155/91   Pulse: 63   Resp: 18   Temp: 98.2 °F (36.8 °C)   SpO2: 96%       Intake/Output Summary (Last 24 hours) at 11/9/2023 1532  Last data filed at 11/9/2023 1215  Gross per 24 hour   Intake 718 ml   Output 1100 ml   Net -382 ml     PHYSICAL EXAM:      MENTAL STATUS -  AWAKE / ALERT  HEENT-     LUNGS - CTA, NO WHEEZES, RALES OR RHONCHI  HEART- RRR, NO RUB, MURMUR, OR GALLOP  ABD - NORMOACTIVE BOWEL SOUNDS, SOFT, NT.    EXT - NO EDEMA OR CYANOSIS  NEURO -oriented x4  Some slight word hesitancy  MOTOR EXAM - RUE/RLE 5/5. LUE/LLE 5/5.      MEDICATIONS  Scheduled Meds:amLODIPine, 5 mg, Oral, Daily  aspirin, 81 mg, Oral, Daily  atorvastatin, 80 mg, Oral, Nightly  carvedilol, 6.25 mg, Oral, BID With Meals  folic acid, 1 mg, Oral, Daily  hydrALAZINE, 100 mg, Oral, Q8H  hydroCHLOROthiazide Oral, 12.5 mg, Oral, Daily  lisinopril, 40 mg, Oral, Daily  melatonin, 10 mg, Oral, Nightly  multivitamin with minerals, 1 tablet, Oral, Daily  pantoprazole, 40 mg, Oral, Q AM  potassium chloride, 10 mEq, Oral, Daily  senna-docusate sodium, 2 tablet, Oral, BID  thiamine, 100 mg, Oral, Daily  ticagrelor, 60 mg, Oral, BID  vilazodone, 40 mg, Oral, Daily      Continuous Infusions:   PRN Meds:.  acetaminophen    senna-docusate sodium **AND** polyethylene glycol **AND** bisacodyl **AND** bisacodyl    hydrOXYzine      RESULTS  No results found for: \"POCGLU\"  Results from last 7 days   Lab Units 11/09/23  0747 11/06/23  0607 11/05/23  0438   WBC 10*3/mm3 8.19 " 6.40 6.27   HEMOGLOBIN g/dL 13.2 11.5* 11.9*   HEMATOCRIT % 39.3 34.7* 35.8*   PLATELETS 10*3/mm3 278 223 238     Results from last 7 days   Lab Units 11/09/23  0747 11/06/23  0607 11/05/23  0438   SODIUM mmol/L 144 141 143   POTASSIUM mmol/L 3.5 3.5 3.6   CHLORIDE mmol/L 107 106 107   CO2 mmol/L 25.9 26.8 26.0   BUN mg/dL 12 12 14   CREATININE mg/dL 0.94 0.99 1.07   CALCIUM mg/dL 9.4 8.7 8.8   BILIRUBIN mg/dL  --  0.4  --    ALK PHOS U/L  --  61  --    ALT (SGPT) U/L  --  21  --    AST (SGOT) U/L  --  13  --    GLUCOSE mg/dL 109* 96 101*           ASSESSMENT and PLAN    Concussion    Status post concussion     History of CVA-history of 15 x 6 mm  infarct in the inferior medial  left cerebellum and a tiny 3 mm infarct in the posterior left cerebellum  aspirin/Brilinta/statin     Impaired mobility  Impaired self-care  Impaired cognition    Visual nckiqfctrj-tonaftam-[id number] eye patch.  May possibly benefit from neuro optometry evaluation as an outpatient     Hypertension-amlodipine/carvedilol/hydralazine/lisinopril  Nov 9 - -162/91-97. Previously on spironlactone or hydrochlorothiazide. Will add hydrochlorothiazide 12.5 mg daily with KDUR 10 meq daily.     Hyperlipidemia     Gastroesophageal reflux disease     Depression-Viibryd     DVT prophylaxis-on dual antiplatelet therapy.  SCDs     GI bleed-evaluated by gastroenterology-Plan is to wait 3 months before he can come off of Brilinta to undergo possible colonoscopy.     TEAM CONF - NOV 9 - TRANSFERS CTG. 4 STAIRS CTG. GAIT 160 FEET RW CTG.   BATH MIN CTG. LBD MIN. UBD SBA. GROOMING SBA. TOILETING MIN. TOILET AND SHOWER TRANSFERS MIN.   Anxiety related to hx of strokes and fall . CONTINENT BOWEL AND BLADDER.   ATTENTION - Mildly impaired attention and visuospatial skills on CLQT.  Moderately impaired memory.  EXPRESSION - Mild anomia   ELOS - TWO WEEKS.        Goal is for home with home health   therapies.  Barrier to discharge:.  Mobility and self-care- work  on strength balance transfers gait actives of daily living to overcome.          Pk Snow MD      Appropriate PPE was worn during the entire visit.  Hand hygiene was completed before and after.

## 2023-11-09 NOTE — PROGRESS NOTES
Case Management  Inpatient Rehabilitation Team Conference    Conference Date/Time: 11/9/2023 7:47:44 AM    Team Conference Attendees:  Raymundo Dukes, MD Trinity Wilde, PT  Khurram Coyne, SERA Gudino, [facility], Rec Therapy  Sherri Clark, THEA Thakkar, RN  Margo Dennis, Psychologist  Dharmesh Echols, Pharmacy    Demographics            Age: 58Y            Gender: Male    Admission Date: 11/6/2023 3:04:00 PM  Rehabilitation Diagnosis:  Status post concussion  History of CVA  Past Medical History: Past Medical History:  Diagnosis Date  ? ADHD (attention deficit hyperactivity disorder)    On going issue  ? Anxiety  ? Lepe's esophagus  ? Benign prostatic hyperplasia Surgery in 12/2021  ? Clotting disorder Intestinal  ? Depression  ? Diverticulitis  ? Diverticulosis  ? Duodenitis  ? Enteritis  ? Failure to thrive (child)  ? Family history of blood clots  ? GERD (gastroesophageal reflux disease)  ? Hyperlipidemia  ? Hypertension  ? Hypertensive emergency  ? Low testosterone  ? Microcytosis  ? Pancreatitis  ? Pneumonia  ? PONV (postoperative nausea and vomiting)  ? Seasonal affective disorder  ? Shoulder injury  ? Stroke  ? Unexplained weight loss        Surgical History    Past Surgical History:  Procedure Laterality Date  ? COLON SURGERY  ? COLONOSCOPY  ? COLONOSCOPY N/A 12/11/2022    Procedure: COLONOSCOPY INTO CECUM WITH HOT SNARE POLYPECTOMY;  Surgeon:  Albert Gallo MD;  Location: [facility] ENDOSCOPY;  Service: Gastroenterology;  Laterality: N/A;  PRE- GI BLEED  POST- DIVERTICULOSIS, POLYP  ? ENDOSCOPY N/A 12/10/2022    Procedure: ESOPHAGOGASTRODUODENOSCOPY;  Surgeon: Albert Gallo MD;  Location: [facility] ENDOSCOPY;  Service: Gastroenterology;  Laterality: N/A;  PRE-  DARK STOOLS  POST- BARRETTS ESOPHAGUS  ? FRACTURE SURGERY   1980    for broken arm  ? FRACTURE SURGERY    for broken hand  ? INCISION AND DRAINAGE ABSCESS   2015    anal  ? PROSTATE SURGERY  ? SMALL INTESTINE  SURGERY  ? TONSILLECTOMY      Plan of Care  Anticipated Discharge Date/Estimated Length of Stay: 2 weeks  Anticipated Discharge Destination: Community discharge with assistance  Discharge Plan : Home with 24/7 assistance of son as needed.  Medical Necessity Expected Level Rationale: Goals are to achieve a level of  supervision with  mobility and self-care and improved balance.   Rehabilitation  prognosis fair.  Medical prognosis fair.  Intensity and Duration: an average of 3 hours/5 days per week  Medical Supervision and 24 Hour Rehab Nursing: x  Physical Therapy: x  PT Intensity/Duration: 1 hour / day, 5 days / week, for approximately 2 weeks  Occupational Therapy: x  OT Intensity/Duration: 1 hour / day, 5 days / week, for approximately 2 weeks  Speech and Language Therapy: x  SLP Intensity/Duration: 1 hour / day, 5 days / week, for approximately 2 weeks  Social Work: x  Therapeutic Recreation: x  Psychology: x  Registered Dietician: x  Updated (if changes indicated)    Anticipated Discharge Date/Estimated Length of Stay:   ELOS: 2 weeks    Based on the patient's medical and functional status, their prognosis and  expected level of functional improvement is: Goals are to achieve a level of  supervision with  mobility and self-care and improved balance.   Rehabilitation  prognosis fair.  Medical prognosis fair.      Interdisciplinary Problem/Goals/Status    All Rehab Problems:  Safety    [RN] Potential for Injury(Active)  Current Status(11/09/2023): Uses call light appropriately  Weekly Goal(11/14/2023): Cues to use call light  Discharge Goal: Items within reach        Psychosocial    [RN] Behavior(Active)  Current Status(11/09/2023): Anxity related to hx of strokes and fall  Weekly Goal(11/14/2023): Allow opportunity to express concers regarding current  status.  Discharge Goal: Demonstrate healthy oping strategies.        Sphincter Control    [RN] Bowel Management(Active)  Current Status(11/09/2023): continent  100%  Weekly Goal(11/14/2023): continet 100%  Discharge Goal: continent 100%    [RN] Bladder Management(Active)  Current Status(11/09/2023): continent 100%  Weekly Goal(11/14/2023): Continent 100%  Discharge Goal: Continent 100%        Self Care    [OT] Bathing(Active)  Current Status(11/08/2023): MIN/CGA  Weekly Goal(11/15/2023): CGA/SBA  Discharge Goal: SBA/Mod I    [OT] Dressing (Lower)(Active)  Current Status(11/08/2023): MIn  Weekly Goal(11/15/2023): CGA  Discharge Goal: SBa/Mod I    [OT] Dressing (Upper)(Active)  Current Status(11/08/2023): SBA  Weekly Goal(11/15/2023): set up/Mod I  Discharge Goal: set up/Mod I    [OT] Eating(Active)  Current Status(11/08/2023): I  Weekly Goal(11/15/2023): I  Discharge Goal: I    [OT] Grooming(Active)  Current Status(11/08/2023): SBA  Weekly Goal(11/15/2023): set up/Mod I  Discharge Goal: Set up/Mod I    [OT] Toileting(Active)  Current Status(11/08/2023): MIn  Weekly Goal(11/15/2023): CGA  Discharge Goal: MOd I        Mobility    [OT] Toilet Transfers(Active)  Current Status(11/08/2023): MIn  Weekly Goal(11/15/2023): CGA  Discharge Goal: SBA/Mod I    [OT] Tub/Shower Transfers(Active)  Current Status(11/08/2023): MIn  Weekly Goal(11/15/2023): CGA  Discharge Goal: SBa/Mod I    [PT] Bed/Chair/Wheelchair(Active)  Current Status(11/08/2023): CGA  Weekly Goal(11/16/2023): SBA  Discharge Goal: SUP    [PT] Bed Mobility(Active)  Current Status(11/08/2023): SUP  Weekly Goal(11/16/2023): TABITHA  Discharge Goal: IND    [PT] Stairs(Active)  Current Status(11/08/2023): 4 CGA  Weekly Goal(11/16/2023): PT ONLY  Discharge Goal: 12 supervison    [PT] Walk(Active)  Current Status(11/08/2023): 160 FWW CGA  Weekly Goal(11/16/2023): PT ONLY  Discharge Goal: 160' with FWW and Supervision        Cognition    [ST] Attention(Active)  Current Status(11/08/2023): Mildly impaired attention and visuospatial skills on  CLQT. Moderately impaired memory.  Weekly Goal(11/16/2023): Will complete high-level  mental manipulation tasks with  MIN-NO cues.  Discharge Goal: Improved attention skills for home environment.        Communication    [ST] Expression(Active)  Current Status(11/08/2023): Mild anomia  Weekly Goal(11/16/2023): Will complete word retrieval tasks with occasional MIN  cues.  Discharge Goal: Will participate in complex conversation with NO cues for  compensations.        Comments: 11/9 Sup bed mob, CGA transfers, can do 4 stairs CGA, amb 160' CGA  FWW.  Min A toilet transfer, Min A shower transfer.   Min A LBD, Min / CGA  bathing.  SBA grooming.  Min A toileting.  Mildly impaired attention and  visuospatial skills on CLQT.  Moderately impaired memory.  Mild anomia.  Cont  bowel and blader.    Signed by: Ijeoma Thakkar RN    Physician CoSigned By: Pk Snow 11/09/2023 09:26:28

## 2023-11-09 NOTE — THERAPY TREATMENT NOTE
Inpatient Rehabilitation - Occupational Therapy Treatment Note    [facility]     Patient Name: Flako Coreas  : 1965  MRN: [id number]    Today's Date: 2023                 Admit Date: 2023       No diagnosis found.    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    Acute CVA (cerebrovascular accident)    HTN (hypertension)    Late effect of cerebrovascular accident (CVA)    Concussion    Fall    Head injury, closed, with concussion    Alcohol abuse       Past Medical History:   Diagnosis Date    ADHD (attention deficit hyperactivity disorder)     On going issue    Anxiety     Lepe's esophagus     Benign prostatic hyperplasia Surgery in 2021    Clotting disorder Intestinal    Depression     Diverticulitis     Diverticulosis     Duodenitis     Enteritis     Failure to thrive (child)     Family history of blood clots     GERD (gastroesophageal reflux disease)     Hyperlipidemia     Hypertension     Hypertensive emergency     Low testosterone     Microcytosis     Pancreatitis     Pneumonia     PONV (postoperative nausea and vomiting)     Seasonal affective disorder     Shoulder injury     Stroke     Unexplained weight loss        Past Surgical History:   Procedure Laterality Date    COLON SURGERY      COLONOSCOPY      COLONOSCOPY N/A 2022    Procedure: COLONOSCOPY INTO CECUM WITH HOT SNARE POLYPECTOMY;  Surgeon: Albert Gallo MD;  Location: [facility] ENDOSCOPY;  Service: Gastroenterology;  Laterality: N/A;  PRE- GI BLEED  POST- DIVERTICULOSIS, POLYP    ENDOSCOPY N/A 12/10/2022    Procedure:  ESOPHAGOGASTRODUODENOSCOPY;  Surgeon: Albert Gallo MD;  Location: [facility] ENDOSCOPY;  Service: Gastroenterology;  Laterality: N/A;  PRE- DARK STOOLS  POST- BARRETTS ESOPHAGUS    FRACTURE SURGERY  1980    for broken arm    FRACTURE SURGERY      for broken hand    INCISION AND DRAINAGE ABSCESS  2015    anal    PROSTATE SURGERY      SMALL INTESTINE SURGERY      TONSILLECTOMY               IRF OT ASSESSMENT FLOWSHEET (last 12 hours)       IRF OT Evaluation and Treatment       Row Name 11/09/23 1310          OT Time and Intention    Document Type daily treatment  -     Mode of Treatment individual therapy;occupational therapy  -     Patient Effort excellent  -     Symptoms Noted During/After Treatment none  -       Row Name 11/09/23 1310          General Information    Patient/Family/Caregiver Comments/Observations pt supine in bed am and pm  -     Existing Precautions/Restrictions fall  double vision. eye patch alternating L and R eye  -       Row Name 11/09/23 1310          Pain Assessment    Pretreatment Pain Rating 0/10 - no pain  -     Posttreatment Pain Rating 0/10 - no pain  -       Row Name 11/09/23 1310          Cognition/Psychosocial    Orientation Status (Cognition) oriented x 3  -     Follows Commands (Cognition) follows one-step commands;over 90% accuracy;verbal cues/prompting required  -     Personal Safety Interventions fall prevention program maintained;gait belt;muscle strengthening facilitated;nonskid shoes/slippers when out of bed  -       Row Name 11/09/23 1310          Vision Assessment/Intervention    Visual Impairment/Limitations diplopia  -     Vision Assessment Comment wore eye patch during second half of am session. pt reports it is helping. pt reports later in day that when he switched it to the other eye it really helped.  -       Row Name 11/09/23 1310          Bathing    [place] Level (Bathing) bathing skills;lower body;upper body;set up;standby assist  -      Assistive Device (Bathing) hand held shower spray hose;grab bar/tub rail;shower chair  -     Position (Bathing) supported sitting;supported standing  [facility]     Set-up Assistance (Bathing) obtain supplies;adjust water temperature  -     Comment (Bathing) sat for most of shower, half stood at times to wash buttocks  -       Row Name 11/09/23 1310          Upper Body Dressing    [place] Level (Upper Body Dressing) upper body dressing skills;don;doff;pull over garment;supervision;set up assistance  -     Position (Upper Body Dressing) supported sitting  -     Set-up Assistance (Upper Body Dressing) obtain clothing  -       Row Name 11/09/23 1310          Lower Body Dressing    [place] Level (Lower Body Dressing) doff;don;pants/bottoms;shoes/slippers;socks;underwear;set up;standby assist  -     Position (Lower Body Dressing) supported sitting;supported standing  [facility]     Set-up Assistance (Lower Body Dressing) obtain clothing  -     Comment (Lower Body Dressing) half stood to complete pulling up undergarment and pants  -[facility] Name 11/09/23 1310          Grooming    [place] Level (Grooming) grooming skills;deodorant application;set up;oral care regimen;standby assist  -     Position (Grooming) sink side;supported sitting;supported standing  -     Set-up Assistance (Grooming) obtain supplies  -     Comment (Grooming) stood to brush teeth  -[facility] Name 11/09/23 1310          Bed Mobility    Supine-Sit [place] (Bed Mobility) supervision  -     Sit-Supine [place] (Bed Mobility) not tested  -       Row Name 11/09/23 1310          Functional Mobility    Functional Mobility- Ind. Level set up required;contact guard assist  -     Functional Mobility- Device walker, front-wheeled  -     Functional Mobility- Comment in room, to BR, shower seat, sink and bed.  -       Row Name 11/09/23 1310          Bed-Chair Transfer    Bed-Chair [place] (Transfers) set  up;contact guard  -     Assistive Device (Bed-Chair Transfers) wheelchair;walker, front-wheeled  -       Row Name 11/09/23 1310          Sit-Stand Transfer    Sit-Stand [place] (Transfers) set up;contact guard  -     Assistive Device (Sit-Stand Transfers) walker, front-wheeled  -KP       Row Name 11/09/23 1310          Stand-Sit Transfer    Stand-Sit [place] (Transfers) set up;contact guard  -     Assistive Device (Stand-Sit Transfers) walker, front-wheeled  -       Row Name 11/09/23 1310          Shower Transfer    Type (Shower Transfer) stand pivot/stand step  -     [place] Level (Shower Transfer) set up;contact guard  -     Assistive Device (Shower Transfer) walker, front-wheeled;shower chair  -     Comment, (Shower Transfer) pt walked in to BR/shower and tsf w walker to shower chair w CGA  -       Row Name 11/09/23 1310          Motor Skills    Therapeutic Exercise elbow/forearm;shoulder;wrist  -       Row Name 11/09/23 1310          Shoulder (Therapeutic Exercise)    Shoulder (Therapeutic Exercise) strengthening exercise  -     Shoulder Strengthening (Therapeutic Exercise) bilateral;flexion;extension;sitting;scapular stabilization;10 repetitions;2 sets  chest press 2.5 dowel  -       Row Name 11/09/23 1310          Elbow/Forearm (Therapeutic Exercise)    Elbow/Forearm (Therapeutic Exercise) strengthening exercise  -     Elbow/Forearm Strengthening (Therapeutic Exercise) right;left;flexion;supination;extension;pronation;sitting;3 lb free weight;10 repetitions;3 sets  -       Row Name 11/09/23 1310          Wrist (Therapeutic Exercise)    Wrist (Therapeutic Exercise) strengthening exercise  -     Wrist Strengthening (Therapeutic Exercise) left;right;flexion;extension;3 lb free weight;10 repetitions;3 sets  -       Row Name 11/09/23 1310          Balance    Static Sitting Balance independent  -     Dynamic Sitting Balance standby assist  -     Position, Sitting  Balance sitting in chair  -     Static Standing Balance contact guard  -     Dynamic Standing Balance contact guard  -     Position/Device Used, Standing Balance walker, front-wheeled  -     Comment, Balance completed standing balance tasks reaching across midline w R and L UE for close pins and inserting onto dowel eugenio. pt completed reaching task for round pegs and inserting into board, square pegs and inserting luis board, and cones and inserting onto dowel eugenio to incr dyn standing bal w CGA.  -       Row Name 11/09/23 1310          Positioning and Restraints    Pre-Treatment Position in bed  -     Post Treatment Position wheelchair  -     In Wheelchair sitting;exit alarm on;with PT  am and pm  -       Row Name 11/09/23 1310          Daily Progress Summary (OT)    Overall Progress Toward Functional Goals (OT) progressing toward functional goals as expected  -               User Key  (r) = Recorded By, (t) = Taken By, (c) = Cosigned By      Initials Name Effective Dates     Bree Divya Annley, OTR 07/11/23 -                      Occupational Therapy Education       Title: PT OT SLP Therapies (Done)       Topic: Occupational Therapy (Done)       Point: ADL training (Done)       Description:   Instruct learner(s) on proper safety adaptation and remediation techniques during self care or transfers.   Instruct in proper use of assistive devices.                  Learning Progress Summary             Patient Acceptance, E, VU by CC at 11/7/2023 6350    Comment: Explanation of OT services, evaluation results and goal setting                         Point: Home exercise program (Done)       Description:   Instruct learner(s) on appropriate technique for monitoring, assisting and/or progressing therapeutic exercises/activities.                  Learning Progress Summary             Patient Acceptance, E, VU by CC at 11/7/2023 1058    Comment: Explanation of OT services, evaluation results and goal  setting                         Point: Precautions (Done)       Description:   Instruct learner(s) on prescribed precautions during self-care and functional transfers.                  Learning Progress Summary             Patient Acceptance, E, VU by  at 11/7/2023 1649    Comment: Explanation of OT services, evaluation results and goal setting                         Point: Body mechanics (Done)       Description:   Instruct learner(s) on proper positioning and spine alignment during self-care, functional mobility activities and/or exercises.                  Learning Progress Summary             Patient Acceptance, E, VU by  at 11/7/2023 1649    Comment: Explanation of OT services, evaluation results and goal setting                                         User Key       Initials Effective Dates Name Provider Type Discipline     06/16/21 -  Rosa Chris OTR Occupational Therapist OT                        OT Recommendation and Plan            Daily Progress Summary (OT)  Overall Progress Toward Functional Goals (OT): progressing toward functional goals as expected            Time Calculation:      Time Calculation- OT       Row Name 11/09/23 1319 11/09/23 0800          Time Calculation- OT    OT Start Time 1230  -KP 0800  -KP     OT Stop Time 1300  -KP 0830  -KP     OT Time Calculation (min) 30 min  -KP 30 min  -KP               User Key  (r) = Recorded By, (t) = Taken By, (c) = Cosigned By      Initials Name Provider Type     Divya Giron OTR Occupational Therapist                  Therapy Charges for Today       Code Description Service Date Service Provider Modifiers Qty    93071975663 HC OT SELF CARE/MGMT/TRAIN EA 15 MIN 11/9/2023 Divya Giron OTR GO 1    65933292085 HC OT THER PROC EA 15 MIN 11/9/2023 Divya Giron OTR GO 1    44824864305 HC OT NEUROMUSC RE EDUCATION EA 15 MIN 11/9/2023 Divya Giron OTR GO 2                     Divya Giron  OTR  11/9/2023

## 2023-11-09 NOTE — PROGRESS NOTES
"  [facility]  Adult Nutrition  Consult Note    SUMMARY     Recommendations    Recommendation:   1. Increase Peptamen VHP to 45mL/hr (1080mL/day) + 2 packets beneprotein TID. Flushes of 50mLq2 or per NP.  With 628kcal from propofol, provides 1858kcal, 135g protein, and 907mL free water.   2. RD to f/u    Goals: Meet >50% EEN/EPN by RD f/u  Nutrition Goal Status: new  Communication of RD Recs: (POC, sticky note, rounds)    Reason for Assessment    Reason For Assessment: consult  Diagnosis: (COVID-19)  Relevant Medical History: HTN, gallstones  Interdisciplinary Rounds: attended    General Information Comments:   7/20/20 RD consulted after pt in resp failure. He was in ICU since the 15th, but resp status worsened on the 19th, requiring intubatation. He is sedated with propofol and has trickle feeds of Peptamen VHP initiated this am via NG.  NFPE not performed due to COVID, but weight trends appear steady per epic.       Nutrition Discharge Planning: pending medical course    Nutrition Risk Screen    Nutrition Risk Screen: no indicators present    Nutrition/Diet History    Food Allergies: NKFA  Factors Affecting Nutritional Intake: NPO, on mechanical ventilation    Anthropometrics    Temp: 97.4 °F (36.3 °C)  Height Method: Stated  Height: 5' 7" (170.2 cm)  Height (inches): 67 in  Weight Method: Bed Scale  Weight: 132.3 kg (291 lb 10.7 oz)  Weight (lb): 291.67 lb  Ideal Body Weight (IBW), Male: 148 lb  % Ideal Body Weight, Male (lb): 197.97 %  BMI (Calculated): 45.7       Lab/Procedures/Meds  Pertinent Labs: reviewed  BUN 23, POCT glucose 196, Albumin 2.4  Pertinent Medications: reviewed  Vitamin C, dexmethasone, lasix, multivitamin, heparin, propofol    Estimated/Assessed Needs    Weight Used For Calorie Calculations: 132.2 kg (291 lb 7.2 oz)  Energy Calorie Requirements (kcal): 1450- 1850  Energy Need Method: Kcal/kg(11-14 permissive underfeeding)  Protein Requirements: 170g(2.5g/kg ideal)  Weight Used " Recreational Therapy Note    Patient Name: Flako Coreas   MRN: [id number]    Therapeutic Recreation Eval and Treat (last 12 hours)       Therapeutic Recreation Eval & Treat       Row Name 11/09/23 1500       Therapeutic Recreation Participation    Recreation Therapy Participation games  -TW    Games board games  Yahtzee  -TW    Objectives of Recreation Participation increase;sense of autonomy by choosing level of participation;positive attitudes leading to a healthy leisure lifestyle  -TW    Comment, Recreation Participation BUE use to roll/ dice; min cues throughout for directions; scorekeeping occ cues  -TW    Recreation Therapy Summary of Participation active participation  -TW              User Key  (r) = Recorded By, (t) = Taken By, (c) = Cosigned By      Initials Name Provider Type    TW Olya Suarez, POOJA Recreational Therapist                      POOJA Rice  11/9/2023     For Protein Calculations: 67.1 kg (148 lb)(IBW)     Estimated Fluid Requirement Method: RDA Method  RDA Method (mL): 1450  CHO Requirement: 200g    Nutrition Prescription Ordered    Current Diet Order: NPO  Current Nutrition Support Formula Ordered: Peptamen Intense VHP  Current Nutrition Support Rate Ordered: 10 (ml)  Current Nutrition Support Frequency Ordered: continuous    Evaluation of Received Nutrient/Fluid Intake    Enteral Calories (kcal): 240  Enteral Protein (gm): 22  Enteral (Free Water) Fluid (mL): 202  Other Calories (kcal): 628(Propofol @ 23.8)    Intake/Output Summary (Last 24 hours) at 7/20/2020 0832  Last data filed at 7/20/2020 0630  Gross per 24 hour   Intake 3878.62 ml   Output 1605 ml   Net 2273.62 ml     240+ 628= 868kcal  % Intake of Estimated Energy Needs: 50 - 75 %  % Meal Intake: NPO    Nutrition Risk  2x week    Assessment and Plan    Nutrition Problem  Inadequate energy intake    Related to (etiology):   Decreased ability to consume sufficient energy    Signs and Symptoms (as evidenced by):   Estimated enteral nutrition intake insufficient to meet needs based on estimated metabolic rate- (trickle feeds)    Interventions:  Enteral nutrition  Collaboration with other providers    Nutrition Diagnosis Status:   New    Monitor and Evaluation    Food and Nutrient Intake: energy intake  Food and Nutrient Adminstration: enteral and parenteral nutrition administration  Anthropometric Measurements: weight  Biochemical Data, Medical Tests and Procedures: electrolyte and renal panel, glucose/endocrine profile  Nutrition-Focused Physical Findings: overall appearance     Malnutrition Assessment         NFPE not performed, patient is noted as being positive for COVID-19.    Nutrition Follow-Up    RD Follow-up?: Yes    Thanks!  Yennifer Torre MPH RD LDN

## 2023-11-09 NOTE — THERAPY TREATMENT NOTE
Inpatient Rehabilitation - Physical Therapy Treatment Note       [facility]     Patient Name: Flako Coreas  : 1965  MRN: [id number]    Today's Date: 2023                    Admit Date: 2023      Visit Dx:   No diagnosis found.    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    Acute CVA (cerebrovascular accident)    HTN (hypertension)    Late effect of cerebrovascular accident (CVA)    Concussion    Fall    Head injury, closed, with concussion    Alcohol abuse       Past Medical History:   Diagnosis Date    ADHD (attention deficit hyperactivity disorder)     On going issue    Anxiety     Lepe's esophagus     Benign prostatic hyperplasia Surgery in 2021    Clotting disorder Intestinal    Depression     Diverticulitis     Diverticulosis     Duodenitis     Enteritis     Failure to thrive (child)     Family history of blood clots     GERD (gastroesophageal reflux disease)     Hyperlipidemia     Hypertension     Hypertensive emergency     Low testosterone     Microcytosis     Pancreatitis     Pneumonia     PONV (postoperative nausea and vomiting)     Seasonal affective disorder     Shoulder injury     Stroke     Unexplained weight loss        Past Surgical History:   Procedure Laterality Date    COLON SURGERY      COLONOSCOPY      COLONOSCOPY N/A 2022    Procedure: COLONOSCOPY INTO CECUM WITH HOT SNARE POLYPECTOMY;  Surgeon: Albert Gallo MD;  Location: [facility] ENDOSCOPY;  Service: Gastroenterology;  Laterality: N/A;  PRE- GI BLEED  POST- DIVERTICULOSIS, POLYP    ENDOSCOPY N/A 12/10/2022     Procedure: ESOPHAGOGASTRODUODENOSCOPY;  Surgeon: Albert Gallo MD;  Location: [facility] ENDOSCOPY;  Service: Gastroenterology;  Laterality: N/A;  PRE- DARK STOOLS  POST- BARRETTS ESOPHAGUS    FRACTURE SURGERY  1980    for broken arm    FRACTURE SURGERY      for broken hand    INCISION AND DRAINAGE ABSCESS  2015    anal    PROSTATE SURGERY      SMALL INTESTINE SURGERY      TONSILLECTOMY         PT ASSESSMENT (last 12 hours)       [facility] PT Evaluation and Treatment       Row Name 11/09/23 0838          PT Time and Intention    Document Type daily treatment  -MD     Mode of Treatment physical therapy  -MD     Patient/Family/Caregiver Comments/Observations Pt sitting in WC showing no signs of acute distress.  -MD       Row Name 11/09/23 0838          General Information    Existing Precautions/Restrictions fall  -MD       Row Name 11/09/23 0838          Pain Assessment    Pretreatment Pain Rating 0/10 - no pain  -MD       Row Name 11/09/23 0838          Cognition/Psychosocial    Orientation Status (Cognition) oriented x 3  -MD     Follows Commands (Cognition) follows one-step commands;75-90% accuracy  -MD     Personal Safety Interventions fall prevention program maintained;gait belt  -MD       Row Name 11/09/23 0838          Sit-Stand Transfer    Sit-Stand Semmes (Transfers) verbal cues;contact guard  -MD     Assistive Device (Sit-Stand Transfers) walker, front-wheeled  -MD       Row Name 11/09/23 0838          Stand-Sit Transfer    Stand-Sit Semmes (Transfers) verbal cues;contact guard  -MD     Assistive Device (Stand-Sit Transfers) walker, front-wheeled  -MD       Row Name 11/09/23 0838          Gait/Stairs (Locomotion)    Semmes Level (Gait) verbal cues;contact guard;minimum assist (75% patient effort)  -MD     Assistive Device (Gait) walker, front-wheeled  -MD     Distance in Feet (Gait) 160'x3  -MD     Deviations/Abnormal Patterns (Gait) gait speed decreased;base of support,  "narrow;scissoring;ataxic  various step lengths  -MD     Bilateral Gait Deviations forward flexed posture  -MD     Left Sided Gait Deviations foot drop/toe drag;heel strike decreased  -MD     Gait Assessment/Intervention Pt demonstrated very slow deliberate gait patter w 2-3sec stance time with each step.  At times pt takes very long steps at other times pt scissors.  VC for heel toe when ambulating.  At end of session PT trialed pt w 2.5lb weigths on ankles while ambulating and pt was able to demonstrate better control w steps.  -MD     Simpson Level (Stairs) verbal cues;contact guard  -MD     Handrail Location (Stairs) both sides  -MD     Number of Steps (Stairs) 12  -MD     Ascending Technique (Stairs) step-over-step  -MD     Descending Technique (Stairs) step-over-step  -MD       Row Name 11/09/23 0838          Balance    Static Standing Balance verbal cues;minimal assist;contact guard  -MD     Dynamic Standing Balance verbal cues;contact guard;minimal assist  -MD     Position/Device Used, Standing Balance parallel bars  -MD     Balance Assessment/Interventions alternating toe tapping on 2\" box.  B UE, R UE and L UE support x5 reps.  Posterior lean.  Stepping onto placement mats: ant/post/lat/med directions B UE support and CGA  -MD     Balance Interventions standing;static;narrowed base of support;tandem standing;foam;trunk training exercise;eyes closed during activity  -MD     Comment, Balance standing feet apart (eyes open) 30sec.  feet apart eyes closed.  Feet together.  Semi tandem for 30sec.  Standing on foam feet apart for 30sec/ feet together 10sec x3 trials.  -MD     Additional Documentation Balance Assessment/Interventions (Row)  -MD       Row Name 11/09/23 0838          Motor Skills    Therapeutic Exercise hip;knee;ankle  -MD       Row Name 11/09/23 0838          Hip (Therapeutic Exercise)    Hip (Therapeutic Exercise) strengthening exercise  -MD     Hip Strengthening (Therapeutic Exercise) " bilateral;aDduction;aBduction;marching while standing;mini squats;10 repetitions  -MD       Row Name 11/09/23 0838          Ankle (Therapeutic Exercise)    Ankle (Therapeutic Exercise) strengthening exercise  -MD     Ankle Strengthening (Therapeutic Exercise) bilateral;dorsiflexion;plantarflexion;10 repetitions  -MD       Row Name 11/09/23 0838          Positioning and Restraints    Pre-Treatment Position sitting in chair/recliner  -MD     Post Treatment Position wheelchair  -MD     In Wheelchair with SLP  -MD               User Key  (r) = Recorded By, (t) = Taken By, (c) = Cosigned By      Initials Name Provider Type    Trinity Dash, PT Physical Therapist                     Physical Therapy Education       Title: PT OT SLP Therapies (Done)       Topic: Physical Therapy (Done)       Point: Mobility training (Done)       Learning Progress Summary             Patient Acceptance, E,D, VU,DU by FINA at 11/8/2023 1551                         Point: Home exercise program (Done)       Learning Progress Summary             Patient Acceptance, E,D, VU,DU by FINA at 11/8/2023 1551                         Point: Body mechanics (Done)       Learning Progress Summary             Patient Acceptance, E,D, VU,DU by FINA at 11/8/2023 1551                         Point: Precautions (Done)       Learning Progress Summary             Patient Acceptance, E, VU by MD at 11/9/2023 0840    Acceptance, E,D, VU,DU by FINA at 11/8/2023 1551    Acceptance, E, VU by MD at 11/7/2023 1605                                         User Key       Initials Effective Dates Name Provider Type Discipline    MD 06/16/21 -  Trinity Velez, PT Physical Therapist PT    FINA 08/24/21 -  Papo Houston, CARIN Physical Therapist PT                    PT Recommendation and Plan    Frequency of Treatment (PT): 5 times per week, 60 minutes per session  Anticipated Equipment Needs at Discharge (PT Eval): front wheeled walker                  Time Calculation:      PT Charges        Row Name 11/09/23 1308 11/09/23 0837          Time Calculation    Start Time 1300  -MD 0830  -MD     Stop Time 1330  -MD 0900  -MD     Time Calculation (min) 30 min  -MD 30 min  -MD     PT Received On -- 11/09/23  -MD     PT - Next Appointment -- 11/10/23  -MD               User Key  (r) = Recorded By, (t) = Taken By, (c) = Cosigned By      Initials Name Provider Type    Trinity Dash, PT Physical Therapist                    Therapy Charges for Today       Code Description Service Date Service Provider Modifiers Qty    79018700541 HC GAIT TRAINING EA 15 MIN 11/9/2023 Trinity Velez, PT GP 2    52581098435 HC PT THER PROC EA 15 MIN 11/9/2023 Trinity Velez, PT GP 2              PT G-Codes  AM-PAC 6 Clicks Score (PT): 23      Trinity Velez, PT  11/9/2023

## 2023-11-09 NOTE — NURSING NOTE
Problem: Fall Injury Risk  Goal: Absence of Fall and Fall-Related Injury  Outcome: Ongoing, Progressing  Fall precautions maintained     Neuro-Pt alert and oriented  GI-Pt cont of urine  -Last BM 11/8m continent  Pain-Pt denied pain

## 2023-11-09 NOTE — PROGRESS NOTES
Reviewed progress and goals with patient. Patient reported he is doing much better today and feels blessed to be here in rehab. Patient's ELOS is two weeks. Patient reported he is wanting to go home but is really concerned about his speech issues and realizes he will get more intense therapy if he stays longer. SW will continue to assess for d/c needs.

## 2023-11-09 NOTE — THERAPY TREATMENT NOTE
Inpatient Rehabilitation - Speech Language Pathology Treatment Note    [facility]     Patient Name: Flako Coreas  : 1965  MRN: [id number]    Today's Date: 2023                   Admit Date: 2023       Visit Dx:    No diagnosis found.    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    Acute CVA (cerebrovascular accident)    HTN (hypertension)    Late effect of cerebrovascular accident (CVA)    Concussion    Fall    Head injury, closed, with concussion    Alcohol abuse       Past Medical History:   Diagnosis Date    ADHD (attention deficit hyperactivity disorder)     On going issue    Anxiety     Lepe's esophagus     Benign prostatic hyperplasia Surgery in 2021    Clotting disorder Intestinal    Depression     Diverticulitis     Diverticulosis     Duodenitis     Enteritis     Failure to thrive (child)     Family history of blood clots     GERD (gastroesophageal reflux disease)     Hyperlipidemia     Hypertension     Hypertensive emergency     Low testosterone     Microcytosis     Pancreatitis     Pneumonia     PONV (postoperative nausea and vomiting)     Seasonal affective disorder     Shoulder injury     Stroke     Unexplained weight loss        Past Surgical History:   Procedure Laterality Date    COLON SURGERY      COLONOSCOPY      COLONOSCOPY N/A 2022    Procedure: COLONOSCOPY INTO CECUM WITH HOT SNARE POLYPECTOMY;  Surgeon: Albert Gallo MD;  Location: [facility] ENDOSCOPY;  Service: Gastroenterology;  Laterality: N/A;  PRE- GI BLEED  POST- DIVERTICULOSIS, POLYP    ENDOSCOPY N/A  "12/10/2022    Procedure: ESOPHAGOGASTRODUODENOSCOPY;  Surgeon: Albert Gallo MD;  Location: [facility] ENDOSCOPY;  Service: Gastroenterology;  Laterality: N/A;  PRE- DARK STOOLS  POST- BARRETTS ESOPHAGUS    FRACTURE SURGERY  1980    for broken arm    FRACTURE SURGERY      for broken hand    INCISION AND DRAINAGE ABSCESS  2015    anal    PROSTATE SURGERY      SMALL INTESTINE SURGERY      TONSILLECTOMY         SLP Recommendation and Plan                                                               SLP EVALUATION (last 72 hours)       SLP SLC Evaluation       Row Name 11/09/23 1330 11/09/23 1030 11/08/23 1330 11/08/23 1030 11/07/23 0830       Communication Assessment/Intervention    Document Type therapy note (daily note)  -AL therapy note (daily note)  -AL therapy note (daily note)  -AL therapy note (daily note)  -AL evaluation  -AL    Subjective Information -- -- -- -- no complaints  -AL    Patient Observations -- -- -- alert;cooperative  -AL alert;cooperative  -AL    Patient/Family/Caregiver Comments/Observations Pt participated well.  -AL Pt participated well. Wore eye patch on right eye due to double vision.  -AL Pt participated well.  -AL Pt participated well. He became tearful regarding deficits x1. SLP provided support. Pt on suicide watch today; CNA present for session to monitor.  -AL Pt participated well. Seen upright in wheelchair in therapy office.  -AL    Patient Effort -- -- -- -- excellent  -AL    Symptoms Noted During/After Treatment -- -- -- -- none  -AL       General Information    Patient Profile Reviewed -- -- -- -- yes  -AL    Pertinent History Of Current Problem -- -- -- -- Pt is a 58-year-old male admitted to inpatient rehab under diagnosis \"s/p concussion\" with onset date 10/23/23. Pt with history of L cerebellar CVA and was hospitalized 8/16-8/24. Speech therapy evaluation on that admission found mild cognitive-communication deficit; however, pt reported he did not participate in outpatient " "speech therapy. MRI 10/26/23 showed \"5 mm focus of T2 shine through on the DWI images in the left cerebellum   consistent with old lacunar infarct is less prominent than on the   comparison study.  No acute infarcts are identified.  Mild scattered T2   hyperintensities consistent with chronic small vessel disease, unchanged.\" Pt with history of ADHD, anxiety. Reported prior to admission marijuana use and ETOH use (70 drinks per week per MD note). Pt reports difficulty with word retrieval, maintaining his train of thought, and recalling numbers. He reports symptoms have persisted since CVA in Aug 2023. He reported he attempted a job interview for a management position after CVA and was unable to communicate adequately during the interview.  -AL    Precautions/Limitations, Vision -- -- -- -- --  Reports some double vision  -AL    Precautions/Limitations, Hearing -- -- -- -- WFL;for purposes of eval  -AL    Patient Level of Education -- -- -- -- Bachelor's degree in Loss Prevention Management and Business. Worked previously managing retail stores.  -AL    Prior Level of Function-Communication -- -- -- -- WFL  prior to CVA in August  -AL    Plans/Goals Discussed with -- -- -- -- patient  -AL    Barriers to Rehab -- -- -- -- none identified  -AL    Patient's Goals for Discharge -- -- -- -- functional communication;functional cognition;return to all previous roles/activities  -AL       Pain Scale: Numbers Pre/Post-Treatment    Pretreatment Pain Rating 0/10 - no pain  -AL 0/10 - no pain  -AL 0/10 - no pain  -AL 0/10 - no pain  -AL 0/10 - no pain  -AL    Posttreatment Pain Rating 0/10 - no pain  -AL -- -- -- 0/10 - no pain  -AL       Motor Speech Assessment/Intervention    Motor Speech, Comment -- -- -- -- No overt dysarthria observed in conversation. Occasional hesitations/dysfluencies noted.  -AL       Cognitive Assessment Intervention- SLP    Cognition, Comment -- -- -- -- BIMS: Pt was oriented to month, year and NORE[place]. He " recalled 2/3 unrelated words after 2 minute delay; MAX cues did not increase accuracy.  -AL       Standardized Tests    Formal Speech Language Tests -- -- -- -- [place] Naming  -AL    Cognitive/Memory Tests -- -- -- -- CLQT: Cognitive Linguistic Quick Test  -AL       [place] Naming    Correct Spontaneous Responses -- -- -- -- 51  -AL    Stimulus Cues Given -- -- -- -- 1  -AL    Correct Responses Following Stimulus Cues -- -- -- -- 1  -AL    Phonemic Cues Given -- -- -- -- 8  -AL    Correct Responses Following Phonemic Cues -- -- -- -- 2  -AL    Total Score -- -- -- -- 51  -AL    [place] Naming Comments -- -- -- -- BNT Norms for Adults 50-59: Mean 55.2, SD 4.0. Pt scored 51/60, indicating that he is 1 SD below the mean for his age. Pt presents with mild anomia in conversation. He independently utilizes circumlocution to compensate. Picture description from WAB assessment appeared [facility]. Pt reports difficulty recalling specific information (i.e. name of a ), difficulty recalling specific numbers/percentages and difficulty maintaining his train of thought in conversation.  -AL       CLQT (The Cognitive Linguistic Quick Test)    Attention Domain Score -- -- -- -- 160  -AL    Attention Severity Rating -- -- -- -- 3: Mild  -AL    Memory Domain Score -- -- -- -- 138  -AL    Memory Severity Rating -- -- -- -- 2: Moderate  -AL    Executive Function Domain Score -- -- -- -- 29  -AL    Executive Function Severity Rating -- -- -- -- 4: WNL  -AL    Language Domain Score -- -- -- -- 30  -AL    Language Severity Rating -- -- -- -- 4: WNL  -AL    Visuospatial Domain Score -- -- -- -- 80  -AL    Visuospatial Severity Rating -- -- -- -- 3: Mild  -AL    Clock Drawing Total Score -- -- -- -- 11  -AL    Clock Drawing Severity Rating -- -- -- -- Mild  -AL    Composite Severity Rating -- -- -- -- 3.2  -AL    Composite Severity Rating Range -- -- -- -- 3.4 - 2.5: Mild  -AL    CLQT Comments -- -- -- -- Cognitive-Linguistic Quick Test (CLQT)  administered. Pt scored an overall composite severity rating of 3.2/4.0, indicating a mild cognitive-communication deficit. He scored WNL in the domains of executive functions and language, mildly impaired in the domains of attention and visuospatial skills and moderately impaired in the domain of memory. Pt scored below the criterion cut-off scores for his age on symbol cancellation (10/12), clock drawing (11/13) story retelling (4/10), and symbol trails (5/10). He exhibited adequate performance on stating personal facts, confrontation naming, generative naming, design memory, mazes and design generation. Pt exhibited impulsivity during assessment, often starting written tasks before clinician had finished instructions. Pt c/o reduced word retrieval and ability to maintain train of thought in conversation. [place] Naming Test administered. Pt scored 51/60, which is 1 SD below the mean for his age group, indicating mild anomia. In additional informal testing, pt exhibited mild-moderately reduced verbal working memory and mildly reduced alternating attention. Recommend continued intensive inpatient speech therapy to improve cognitive-communication skills in order to improve pt's level of independence.  -AL       SLP Evaluation Clinical Impressions    SLP Diagnosis -- -- -- -- mild;cognitive-linguistic disorder  mild anomia  -AL    Rehab Potential/Prognosis -- -- -- -- good  -AL    SLC Criteria for Skilled Therapy Interventions Met -- -- -- -- yes  -AL    Functional Impact -- -- -- -- functional impact in social situations;needs 24 hour supervision;difficulty completing home management task;difficulty completing vocational tasks  -AL       Recommendations    Therapy Frequency (SLP SLC) -- -- -- -- 2 times/day;5 days per week  -AL    Predicted Duration Therapy Intervention (Days) -- -- -- -- until discharge  -AL    Anticipated Discharge Disposition (SLP) -- -- -- -- home with  services  -AL       Communication  Treatment Objective and Progress Goals (SLP)    SLC LTGs -- -- -- -- Patient will demonstrate functional language skills for return to discharge environment;Patient will demonstrate functional cognitive-linguistic skills for return to discharge environment  -AL    Verbal Expression Treatment Objectives -- -- -- -- Verbal Expression Treatment Objectives (Group)  -AL    Cognitive Linguistic Treatment Objectives -- -- -- -- Cognitive Linguistic Treatment Objectives (Group)  -AL       Patient will demonstrate functional language skills for return to discharge environment     [place] -- -- -- -- with use of compensatory strategies  -AL    Time frame -- -- -- -- by discharge  -AL       Patient will demonstrate functional cognitive-linguistic skills for return to discharge environment    [place] -- -- -- -- with use of compensatory strategies  -AL    Time frame -- -- -- -- by discharge  -AL       Verbal Expression Treatment Objectives    Word Retrieval Skills Selection -- -- -- -- word retrieval, SLP goal 1  -AL       Word Retrieval Skills Goal 1 (SLP)    Improve Word Retrieval Skills By Goal 1 (SLP) -- -- -- -- low frequency;responsive naming task;confrontational naming task;supplying an antonym;supplying a synonym;completing a divergent task;80%;independently (over 90% accuracy)  -AL    Time Frame (Word Retrieval Goal 1, SLP) -- -- -- -- 1 week  -AL       Cognitive Linguistic Treatment Objectives    Attention Selection -- -- -- -- attention, SLP goal 1  -AL    Memory Skills Selection -- -- -- -- memory skills, SLP goal 1  -AL    Functional Math Skills Selection -- -- -- -- functional math skills, SLP goal 1  -AL    Executive Function Skills Selection -- -- -- -- executive function skills, SLP goal 1  -AL       Attention Goal 1 (SLP)    Improve Attention by Goal 1 (SLP) -- -- -- -- complete divided attention task;complete selective attention task;90%;independently (over 90% accuracy)  -AL    Time Frame  (Attention Goal 1, SLP) -- -- -- -- 1 week  -AL       Memory Skills Goal 1 (SLP)    Improve Memory Skills Through Goal 1 (SLP) -- -- -- -- recalling unrelated word lists with an imposed delay;listen to a paragraph and answer questions;repeat list in sequential order;recall details from a word list;use memory strategies;90%;independently (over 90% accuracy)  -AL    Time Frame (Memory Skills Goal 1, SLP) -- -- -- -- 1 week  -AL       Functional Math Skills Goal 1 (SLP)    Improve Functional Math Skills Through Goal 1 (SLP) -- -- -- -- complete functional math task;90%;independently (over 90% accuracy)  -AL    Time Frame (Functional Math Skills Goal 1, SLP) -- -- -- -- 1 week  -AL       Executive Functional Skills Goal 1 (SLP)    Improve Executive Function Skills Goal 1 (SLP) -- -- -- -- identify strategies, strengths, limitations;home management activity;perform self-evaluation;90%;independently (over 90% accuracy)  -AL    Time Frame (Executive Function Skills Goal 1, SLP) -- -- -- -- 1 week  -AL              User Key  (r) = Recorded By, (t) = Taken By, (c) = Cosigned By      Initials Name Effective Dates    Yoselyn Shah, MS CCC-SLP 06/16/21 -                        EDUCATION    The patient has been educated in the following areas:       Cognitive Impairment Communication Impairment.             SLP GOALS       Row Name 11/09/23 1330 11/09/23 1030 11/08/23 1330       Word Retrieval Skills Goal 1 (SLP)    Improve Word Retrieval Skills By Goal 1 (SLP) low frequency;responsive naming task;confrontational naming task;supplying an antonym;supplying a synonym;completing a divergent task;80%;independently (over 90% accuracy)  -AL -- low frequency;responsive naming task;confrontational naming task;supplying an antonym;supplying a synonym;completing a divergent task;80%;independently (over 90% accuracy)  -AL    Time Frame (Word Retrieval Goal 1, SLP) 1 week  -AL -- 1 week  -AL    Progress/Outcomes (Word Retrieval Goal  "1, SLP) good progress toward goal  -AL -- goal ongoing  -AL    Comment (Word Retrieval Goal 1, SLP) Responsive naming of words ending in \"ize\": 90% with NO cues, 100% with MIN cues. Pt read a short story and attempted to retell to clinician. He required MIN-MOD cues to provide relevant details and NO cues for word finding. Noted he reduced sentence length and exhibited hesitations. Question impact of reduced immediate memory vs. thought organization on this task.  -AL -- Responsive naming for 7 letter words given definition: 16/18 with NO cues, 18/18 with MIN-MOD cues.  -AL       Attention Goal 1 (SLP)    Improve Attention by Goal 1 (SLP) complete divided attention task;complete selective attention task;90%;independently (over 90% accuracy)  -AL -- --    Time Frame (Attention Goal 1, SLP) 1 week  -AL -- --    Progress/Outcomes (Attention Goal 1, SLP) good progress toward goal  -AL -- --    Comment (Attention Goal 1, SLP) Written \"if/then\" directions: 7/8 with NO cues, 8/8 with MOD cues on initial trial. Pt benefited from cues to slow down and to talk through instructions out-loud.  -AL -- --       Memory Skills Goal 1 (SLP)    Improve Memory Skills Through Goal 1 (SLP) -- recalling unrelated word lists with an imposed delay;listen to a paragraph and answer questions;repeat list in sequential order;recall details from a word list;use memory strategies;90%;independently (over 90% accuracy)  -AL --    Time Frame (Memory Skills Goal 1, SLP) -- 1 week  -AL --    Progress/Outcomes (Memory Skills Goal 1, SLP) -- goal ongoing  -AL --    Comment (Memory Skills Goal 1, SLP) -- Working memory for 4 unit list retention task: 30% with NO cues, 100% with MIN-MOD cues. Immediate memory for voicemails using note takin% with NO cues, 100% with MIN cues. Pt reported he has noticed hearing loss prior to admission. Stated he had difficulty hearing people in restaurants. Reports he has to read lips while he is listening to " clinician. SLP encouraged pt to have hearing tested at discharge. Discussed impact of reduced hearing on cognitive resource allocation.  -AL --       Functional Math Skills Goal 1 (SLP)    Improve Functional Math Skills Through Goal 1 (SLP) -- -- complete functional math task;90%;independently (over 90% accuracy)  -AL    Time Frame (Functional Math Skills Goal 1, SLP) -- -- 1 week  -AL    Progress/Outcomes (Functional Math Skills Goal 1, SLP) -- -- good progress toward goal  -AL    Comment (Functional Math Skills Goal 1, SLP) -- -- Grocery prices task: 80% with NO cues, 100% with MIN cues. Moderately complex temporal problem solving (auditory): 50% with NO cues, 90% with repetition of stimulus, 100% with MIN cues.  -AL      Row Name 11/08/23 1030 11/07/23 0830          Patient will demonstrate functional language skills for return to discharge environment     [place] -- with use of compensatory strategies  -AL     Time frame -- by discharge  -AL        Patient will demonstrate functional cognitive-linguistic skills for return to discharge environment    [place] -- with use of compensatory strategies  -AL     Time frame -- by discharge  -AL        Word Retrieval Skills Goal 1 (SLP)    Improve Word Retrieval Skills By Goal 1 (SLP) low frequency;responsive naming task;confrontational naming task;supplying an antonym;supplying a synonym;completing a divergent task;80%;independently (over 90% accuracy)  -AL low frequency;responsive naming task;confrontational naming task;supplying an antonym;supplying a synonym;completing a divergent task;80%;independently (over 90% accuracy)  -AL     Time Frame (Word Retrieval Goal 1, SLP) 1 week  -AL 1 week  -AL     Progress/Outcomes (Word Retrieval Goal 1, SLP) goal ongoing  -AL --     Comment (Word Retrieval Goal 1, SLP) Responsive naming for high-level vocabulary given ending (ine): 60% with NO cues, 80% with MIN cues, 90% with MOD cues.  -AL --        Attention Goal 1  (SLP)    Improve Attention by Goal 1 (SLP) -- complete divided attention task;complete selective attention task;90%;independently (over 90% accuracy)  -AL     Time Frame (Attention Goal 1, SLP) -- 1 week  -AL     Comment (Attention Goal 1, SLP) Discussed impact of frustration/anxiety on concentration and word retrieval. Pt reported he does not feel anxious during cognitive tasks, but notes when he is frustrated he loses his train of thought.  Discussed impact of relaxation techniques (deep breathing) on assisting with concentration. Plan to discuss further in next session.  -AL --        Memory Skills Goal 1 (SLP)    Improve Memory Skills Through Goal 1 (SLP) recalling unrelated word lists with an imposed delay;listen to a paragraph and answer questions;repeat list in sequential order;recall details from a word list;use memory strategies;90%;independently (over 90% accuracy)  -AL recalling unrelated word lists with an imposed delay;listen to a paragraph and answer questions;repeat list in sequential order;recall details from a word list;use memory strategies;90%;independently (over 90% accuracy)  -AL     Time Frame (Memory Skills Goal 1, SLP) 1 week  -AL 1 week  -AL     Progress/Outcomes (Memory Skills Goal 1, SLP) goal ongoing  -AL --     Comment (Memory Skills Goal 1, SLP) Working memory for 4 unit list retention task: 80% with NO cues, 100% with MIN cues. 5 unit list retention: 40% with NO cues, 60% with MIN cues, 100% with MOD-MAX cues. Pt benefited from use of categorizing strategy. Immediate memory for voicemails: 60% with one repetition, 80% with 2 repetitions, 100% with 3 repetitions of stimulus.  -AL --        Functional Math Skills Goal 1 (SLP)    Improve Functional Math Skills Through Goal 1 (SLP) -- complete functional math task;90%;independently (over 90% accuracy)  -AL     Time Frame (Functional Math Skills Goal 1, SLP) -- 1 week  -AL        Executive Functional Skills Goal 1 (SLP)    Improve Executive  Function Skills Goal 1 (SLP) -- identify strategies, strengths, limitations;home management activity;perform self-evaluation;90%;independently (over 90% accuracy)  -AL     Time Frame (Executive Function Skills Goal 1, SLP) -- 1 week  -AL     Comment (Executive Function Skills Goal 1, SLP) SLP reviewed results of CLQT from yesterday. Discussed treatment plan. Pt reported feeling frustrated with deficits.  -AL --               User Key  (r) = Recorded By, (t) = Taken By, (c) = Cosigned By      Initials Name Provider Type    Yoselyn Shah MS CCC-SLP Speech and Language Pathologist                                Time Calculation:        Time Calculation- SLP       Row Name 11/09/23 1512 11/09/23 1120          Time Calculation- SLP    SLP Start Time 1330  -AL 1030  -AL     SLP Stop Time 1400  -AL 1100  -AL     SLP Time Calculation (min) 30 min  -AL 30 min  -AL     SLP Received On 11/09/23  -AL 11/09/23  -AL               User Key  (r) = Recorded By, (t) = Taken By, (c) = Cosigned By      Initials Name Provider Type    Yoselyn Shah MS CCC-SLP Speech and Language Pathologist                      Therapy Charges for Today       Code Description Service Date Service Provider Modifiers Qty    04549877380 HC ST DEV OF COGN SKILLS INITIAL 15 MIN 11/8/2023 Yoselyn Gudino MS CCC-SLP  1    43807937167 HC ST DEV OF COGN SKILLS EACH ADDT'L 15 MIN 11/8/2023 Yoselyn Gudino MS CCC-SLP  3    [id number] HC ST DEV OF COGN SKILLS INITIAL 15 MIN 11/9/2023 Yoselyn Gudino MS CCC-SLP  1    [id number] HC ST DEV OF COGN SKILLS EACH ADDT'L 15 MIN 11/9/2023 Yoselyn Gudino MS CCC-SLP  3                             Yoselyn Gudino MS CCC-SLP  11/9/2023

## 2023-11-09 NOTE — PROGRESS NOTES
Inpatient Rehabilitation Plan of Care Note    Plan of Care  Care Plan Reviewed - No updates at this time.    Safety    [RN] Potential for Injury(Active)  Current Status(11/09/2023): Uses call light appropriately  Weekly Goal(11/14/2023): Cues to use call light  Discharge Goal: Items within reach    Performed Intervention(s)  Items within reach  Safety rounds  Bed alarm/chair alarm      Psychosocial    [RN] Behavior(Active)  Current Status(11/09/2023): Anxity related to hx of strokes and fall  Weekly Goal(11/14/2023): Allow opportunity to express concers regarding current  status.  Discharge Goal: Demonstrate healthy oping strategies.    Performed Intervention(s)  Medication  verbalies needs and concerns  suicidal precautions placed today      Sphincter Control    [RN] Bowel Management(Active)  Current Status(11/09/2023): continent 100%  Weekly Goal(11/14/2023): continet 100%  Discharge Goal: continent 100%    [RN] Bladder Management(Active)  Current Status(11/09/2023): continent 100%  Weekly Goal(11/14/2023): Continent 100%  Discharge Goal: Continent 100%    Performed Intervention(s)  Therapeutic exercises/modalities  Elimination schedule  Encourage appropriate diet    Signed by: Anu Sotelo RN

## 2023-11-10 PROCEDURE — 97130 THER IVNTJ EA ADDL 15 MIN: CPT

## 2023-11-10 PROCEDURE — 97112 NEUROMUSCULAR REEDUCATION: CPT | Performed by: OCCUPATIONAL THERAPIST

## 2023-11-10 PROCEDURE — 97116 GAIT TRAINING THERAPY: CPT

## 2023-11-10 PROCEDURE — 97129 THER IVNTJ 1ST 15 MIN: CPT

## 2023-11-10 PROCEDURE — 97112 NEUROMUSCULAR REEDUCATION: CPT

## 2023-11-10 RX ADMIN — CARVEDILOL 6.25 MG: 6.25 TABLET, FILM COATED ORAL at 07:35

## 2023-11-10 RX ADMIN — Medication 10 MG: at 23:25

## 2023-11-10 RX ADMIN — PANTOPRAZOLE SODIUM 40 MG: 40 TABLET, DELAYED RELEASE ORAL at 05:24

## 2023-11-10 RX ADMIN — POTASSIUM CHLORIDE 10 MEQ: 750 TABLET, EXTENDED RELEASE ORAL at 07:34

## 2023-11-10 RX ADMIN — ASPIRIN 81 MG: 81 TABLET, COATED ORAL at 07:35

## 2023-11-10 RX ADMIN — Medication 100 MG: at 07:34

## 2023-11-10 RX ADMIN — CARVEDILOL 6.25 MG: 6.25 TABLET, FILM COATED ORAL at 17:43

## 2023-11-10 RX ADMIN — LISINOPRIL 40 MG: 40 TABLET ORAL at 07:34

## 2023-11-10 RX ADMIN — HYDRALAZINE HYDROCHLORIDE 100 MG: 50 TABLET, FILM COATED ORAL at 23:26

## 2023-11-10 RX ADMIN — ATORVASTATIN CALCIUM 80 MG: 80 TABLET, FILM COATED ORAL at 23:26

## 2023-11-10 RX ADMIN — FOLIC ACID 1 MG: 1 TABLET ORAL at 07:34

## 2023-11-10 RX ADMIN — TICAGRELOR 60 MG: 60 TABLET ORAL at 07:35

## 2023-11-10 RX ADMIN — TICAGRELOR 60 MG: 60 TABLET ORAL at 23:26

## 2023-11-10 RX ADMIN — MULTIPLE VITAMINS W/ MINERALS TAB 1 TABLET: TAB at 07:35

## 2023-11-10 RX ADMIN — HYDRALAZINE HYDROCHLORIDE 100 MG: 50 TABLET, FILM COATED ORAL at 05:48

## 2023-11-10 RX ADMIN — HYDRALAZINE HYDROCHLORIDE 100 MG: 50 TABLET, FILM COATED ORAL at 13:13

## 2023-11-10 RX ADMIN — VILAZODONE HYDROCHLORIDE 40 MG: 40 TABLET, FILM COATED ORAL at 07:35

## 2023-11-10 RX ADMIN — HYDROCHLOROTHIAZIDE 12.5 MG: 12.5 TABLET ORAL at 07:37

## 2023-11-10 RX ADMIN — HYDROXYZINE HYDROCHLORIDE 50 MG: 25 TABLET ORAL at 15:06

## 2023-11-10 RX ADMIN — AMLODIPINE BESYLATE 5 MG: 5 TABLET ORAL at 07:35

## 2023-11-10 NOTE — PROGRESS NOTES
" LOS: 4 days   Patient Care Team:  Akash Rodriguez Jr., DO as PCP - General (Sports Medicine)      NIMESH ROCHA  1965      ADMITTING DIAGNOSIS:    Status post concussion  History of CVA-aspirin/Brilinta/statin    Subjective       Balance slowly improving.  Working on cognitive activities with speech therapy.  Tolerating therapies.  No new weakness.  Alternate eye patch which she finds to be helpful      Objective     Vitals:    11/10/23 0734   BP: 135/88   Pulse: 78   Resp:    Temp:    SpO2:        Intake/Output Summary (Last 24 hours) at 11/10/2023 0956  Last data filed at 11/10/2023 0730  Gross per 24 hour   Intake 720 ml   Output 1075 ml   Net -355 ml     PHYSICAL EXAM:      MENTAL STATUS -  AWAKE / ALERT  HEENT-     LUNGS - CTA, NO WHEEZES, RALES OR RHONCHI  HEART- RRR, NO RUB, MURMUR, OR GALLOP  ABD - NORMOACTIVE BOWEL SOUNDS, SOFT, NT.    EXT - NO EDEMA OR CYANOSIS  NEURO -oriented x4  Some slight word hesitancy  MOTOR EXAM - RUE/RLE 5/5. LUE/LLE 5/5.      MEDICATIONS  Scheduled Meds:amLODIPine, 5 mg, Oral, Daily  aspirin, 81 mg, Oral, Daily  atorvastatin, 80 mg, Oral, Nightly  carvedilol, 6.25 mg, Oral, BID With Meals  folic acid, 1 mg, Oral, Daily  hydrALAZINE, 100 mg, Oral, Q8H  hydroCHLOROthiazide Oral, 12.5 mg, Oral, Daily  lisinopril, 40 mg, Oral, Daily  melatonin, 10 mg, Oral, Nightly  multivitamin with minerals, 1 tablet, Oral, Daily  pantoprazole, 40 mg, Oral, Q AM  potassium chloride, 10 mEq, Oral, Daily  senna-docusate sodium, 2 tablet, Oral, BID  thiamine, 100 mg, Oral, Daily  ticagrelor, 60 mg, Oral, BID  vilazodone, 40 mg, Oral, Daily      Continuous Infusions:   PRN Meds:.  acetaminophen    senna-docusate sodium **AND** polyethylene glycol **AND** bisacodyl **AND** bisacodyl    hydrOXYzine      RESULTS  No results found for: \"POCGLU\"  Results from last 7 days   Lab Units 11/09/23  0747 11/06/23  0607 11/05/23  0438   WBC 10*3/mm3 8.19 6.40 6.27   HEMOGLOBIN g/dL 13.2 11.5* 11.9* "   HEMATOCRIT % 39.3 34.7* 35.8*   PLATELETS 10*3/mm3 278 223 238     Results from last 7 days   Lab Units 11/09/23  0747 11/06/23  0607 11/05/23  0438   SODIUM mmol/L 144 141 143   POTASSIUM mmol/L 3.5 3.5 3.6   CHLORIDE mmol/L 107 106 107   CO2 mmol/L 25.9 26.8 26.0   BUN mg/dL 12 12 14   CREATININE mg/dL 0.94 0.99 1.07   CALCIUM mg/dL 9.4 8.7 8.8   BILIRUBIN mg/dL  --  0.4  --    ALK PHOS U/L  --  61  --    ALT (SGPT) U/L  --  21  --    AST (SGOT) U/L  --  13  --    GLUCOSE mg/dL 109* 96 101*           ASSESSMENT and PLAN    Concussion    Status post concussion     History of CVA-history of 15 x 6 mm  infarct in the inferior medial  left cerebellum and a tiny 3 mm infarct in the posterior left cerebellum  aspirin/Brilinta/statin     Impaired mobility  Impaired self-care  Impaired cognition    Visual bhfmtgppgv-wulvbnrv-qaxqyygab eye patch.  May possibly benefit from neuro optometry evaluation as an outpatient     Hypertension-amlodipine/carvedilol/hydralazine/lisinopril  Nov 9 - -162/91-97. Previously on spironlactone or hydrochlorothiazide. Will add hydrochlorothiazide 12.5 mg daily with KDUR 10 meq daily.     Hyperlipidemia     Gastroesophageal reflux disease     Depression-Viibryd     DVT prophylaxis-on dual antiplatelet therapy.  SCDs     GI bleed-evaluated by gastroenterology-Plan is to wait 3 months before he can come off of Brilinta to undergo possible colonoscopy.     TEAM CONF - NOV 9 - TRANSFERS CTG. 4 STAIRS CTG. GAIT 160 FEET RW CTG.   BATH MIN CTG. LBD MIN. UBD SBA. GROOMING SBA. TOILETING MIN. TOILET AND SHOWER TRANSFERS MIN.   Anxiety related to hx of strokes and fall . CONTINENT BOWEL AND BLADDER.   ATTENTION - Mildly impaired attention and visuospatial skills on CLQT.  Moderately impaired memory.  EXPRESSION - Mild anomia   ELOS - TWO WEEKS.        Goal is for home with home health   therapies.  Barrier to discharge:.  Mobility and self-care- work on strength balance transfers gait actives  of daily living to overcome.          Pk Snow MD      Appropriate PPE was worn during the entire visit.  Hand hygiene was completed before and after.

## 2023-11-10 NOTE — THERAPY TREATMENT NOTE
Inpatient Rehabilitation - Physical Therapy Treatment Note       [facility]     Patient Name: Flako Coreas  : 1965  MRN: [id number]    Today's Date: 11/10/2023                    Admit Date: 2023      Visit Dx:   No diagnosis found.    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    Acute CVA (cerebrovascular accident)    HTN (hypertension)    Late effect of cerebrovascular accident (CVA)    Concussion    Fall    Head injury, closed, with concussion    Alcohol abuse       Past Medical History:   Diagnosis Date    ADHD (attention deficit hyperactivity disorder)     On going issue    Anxiety     Lepe's esophagus     Benign prostatic hyperplasia Surgery in 2021    Clotting disorder Intestinal    Depression     Diverticulitis     Diverticulosis     Duodenitis     Enteritis     Failure to thrive (child)     Family history of blood clots     GERD (gastroesophageal reflux disease)     Hyperlipidemia     Hypertension     Hypertensive emergency     Low testosterone     Microcytosis     Pancreatitis     Pneumonia     PONV (postoperative nausea and vomiting)     Seasonal affective disorder     Shoulder injury     Stroke     Unexplained weight loss        Past Surgical History:   Procedure Laterality Date    COLON SURGERY      COLONOSCOPY      COLONOSCOPY N/A 2022    Procedure: COLONOSCOPY INTO CECUM WITH HOT SNARE POLYPECTOMY;  Surgeon: Albert Gallo MD;  Location: [facility] ENDOSCOPY;  Service: Gastroenterology;  Laterality: N/A;  PRE- GI BLEED  POST- DIVERTICULOSIS, POLYP    ENDOSCOPY N/A 12/10/2022     Procedure: ESOPHAGOGASTRODUODENOSCOPY;  Surgeon: Albert Gallo MD;  Location: [facility] ENDOSCOPY;  Service: Gastroenterology;  Laterality: N/A;  PRE- DARK STOOLS  POST- BARRETTS ESOPHAGUS    FRACTURE SURGERY  1980    for broken arm    FRACTURE SURGERY      for broken hand    INCISION AND DRAINAGE ABSCESS  2015    anal    PROSTATE SURGERY      SMALL INTESTINE SURGERY      TONSILLECTOMY         PT ASSESSMENT (last 12 hours)       IRF PT Evaluation and Treatment       Row Name 11/10/23 1113          PT Time and Intention    Document Type daily treatment  -MD     Mode of Treatment physical therapy  -MD     Patient/Family/Caregiver Comments/Observations Pt sitting in WC w c/o nausea.  No signs of acute distress.  -MD       Row Name 11/10/23 1113          General Information    Existing Precautions/Restrictions fall  -MD       Row Name 11/10/23 1113          Pain Assessment    Pretreatment Pain Rating 0/10 - no pain  -MD       Row Name 11/10/23 1113          Cognition/Psychosocial    Orientation Status (Cognition) oriented x 3  -MD     Follows Commands (Cognition) follows one-step commands;over 90% accuracy;verbal cues/prompting required  -MD     Personal Safety Interventions fall prevention program maintained;gait belt  -MD       Row Name 11/10/23 1113          Bed Mobility    Rolling Right Lee (Bed Mobility) supervision  -MD     Sit-Supine Lee (Bed Mobility) standby assist  -MD       Row Name 11/10/23 1113          Sit-Stand Transfer    Sit-Stand Lee (Transfers) verbal cues;contact guard  -MD     Assistive Device (Sit-Stand Transfers) walker, front-wheeled;wheelchair  -MD       Row Name 11/10/23 1113          Stand-Sit Transfer    Stand-Sit Lee (Transfers) verbal cues;contact guard  -MD     Assistive Device (Stand-Sit Transfers) walker, front-wheeled;wheelchair  -MD       Row Name 11/10/23 1113          Gait/Stairs (Locomotion)    Lee Level (Gait) verbal cues;contact  "guard;minimum assist (75% patient effort)  -MD     Assistive Device (Gait) walker, front-wheeled  -MD     Distance in Feet (Gait) 80'x1 and 160'x2  -MD     Deviations/Abnormal Patterns (Gait) gait speed decreased;base of support, narrow;scissoring;ataxic  -MD     Bilateral Gait Deviations forward flexed posture  -MD     Left Sided Gait Deviations foot drop/toe drag;heel strike decreased  -MD     Gait Assessment/Intervention 2.5lb ankle weights on B ankles while ambulating to allow for greater B LE control through swing phase of gait due to ataxia  -MD       Row Name 11/10/23 1113          Balance    Static Standing Balance verbal cues;minimal assist  -MD     Position/Device Used, Standing Balance deana-bars  -MD     Balance Interventions standing;static;narrowed base of support;trunk training exercise;foam  modified tandem stance  -MD     Comment, Balance static standing on foam w feet together and feet apart w UE support and w/o UE support at deana bars.  alternating toe taps w L UE support at deana bars tapping on a 4\" step w min-Mod A and VC/TC to engage core  -MD       Row Name 11/10/23 1113          Hip (Therapeutic Exercise)    Hip Strengthening (Therapeutic Exercise) bilateral;marching while seated;15 repititions  -MD       Row Name 11/10/23 1113          Knee (Therapeutic Exercise)    Knee (Therapeutic Exercise) strengthening exercise  -MD     Knee Strengthening (Therapeutic Exercise) bilateral;marching while seated;LAQ (long arc quad);hamstring curls;15 repititions  -MD       Row Name 11/10/23 1113          Ankle (Therapeutic Exercise)    Ankle Strengthening (Therapeutic Exercise) bilateral;dorsiflexion;15 repititions  -MD       Row Name 11/10/23 1113          Positioning and Restraints    Pre-Treatment Position sitting in chair/recliner  -MD     Post Treatment Position bed  -MD     In Bed supine;exit alarm on;call light within reach  -MD               User Key  (r) = Recorded By, (t) = Taken By, (c) = " Cosigned By      Initials Name Provider Type    Trinity Dash, PT Physical Therapist                     Physical Therapy Education       Title: PT OT SLP Therapies (Done)       Topic: Physical Therapy (Done)       Point: Mobility training (Done)       Learning Progress Summary             Patient Acceptance, E,D, VU,DU by DP at 11/8/2023 1551                         Point: Home exercise program (Done)       Learning Progress Summary             Patient Acceptance, E,D, VU,DU by DP at 11/8/2023 1551                         Point: Body mechanics (Done)       Learning Progress Summary             Patient Acceptance, E,D, VU,DU by DP at 11/8/2023 1551                         Point: Precautions (Done)       Learning Progress Summary             Patient Acceptance, E, VU by MD at 11/10/2023 1117    Acceptance, E, VU by MD at 11/9/2023 0840    Acceptance, E,D, VU,DU by FINA at 11/8/2023 1551    Acceptance, E, VU by MD at 11/7/2023 1605                                         User Key       Initials Effective Dates Name Provider Type Discipline    MD 06/16/21 -  Trinity Velez, PT Physical Therapist PT    FINA 08/24/21 -  Papo Houston PT Physical Therapist PT                    PT Recommendation and Plan    Frequency of Treatment (PT): 5 times per week, 60 minutes per session  Anticipated Equipment Needs at Discharge (PT Eval): front wheeled walker                  Time Calculation:      PT Charges       Row Name 11/10/23 1404 11/10/23 1113          Time Calculation    Start Time 1400  -MD 1100  -MD     Stop Time 1430  -MD 1130  -MD     Time Calculation (min) 30 min  -MD 30 min  -MD     PT Received On -- 11/10/23  -MD     PT - Next Appointment -- 11/11/23  -MD               User Key  (r) = Recorded By, (t) = Taken By, (c) = Cosigned By      Initials Name Provider Type    Trinity Dash, PT Physical Therapist                    Therapy Charges for Today       Code Description Service Date Service Provider Modifiers Qty     98338517100 HC GAIT TRAINING EA 15 MIN 11/9/2023 Trinity Velez, PT GP 2    25413853152 HC PT THER PROC EA 15 MIN 11/9/2023 Trinity Velez, PT GP 2    00571156243 HC PT NEUROMUSC RE EDUCATION EA 15 MIN 11/10/2023 Trinity Velez, PT GP 2    85586853966 HC GAIT TRAINING EA 15 MIN 11/10/2023 Trinity Velez, PT GP 2              PT G-Codes  AM-PAC 6 Clicks Score (PT): 23      Trinity Velez, PT  11/10/2023

## 2023-11-10 NOTE — THERAPY TREATMENT NOTE
Inpatient Rehabilitation - Occupational Therapy Treatment Note    Psychiatric     Patient Name: Flako Coreas  : 1965  MRN: [id number]    Today's Date: 11/10/2023                 Admit Date: 2023       No diagnosis found.    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    Acute CVA (cerebrovascular accident)    HTN (hypertension)    Late effect of cerebrovascular accident (CVA)    Concussion    Fall    Head injury, closed, with concussion    Alcohol abuse       Past Medical History:   Diagnosis Date    ADHD (attention deficit hyperactivity disorder)     On going issue    Anxiety     Lepe's esophagus     Benign prostatic hyperplasia Surgery in 2021    Clotting disorder Intestinal    Depression     Diverticulitis     Diverticulosis     Duodenitis     Enteritis     Failure to thrive (child)     Family history of blood clots     GERD (gastroesophageal reflux disease)     Hyperlipidemia     Hypertension     Hypertensive emergency     Low testosterone     Microcytosis     Pancreatitis     Pneumonia     PONV (postoperative nausea and vomiting)     Seasonal affective disorder     Shoulder injury     Stroke     Unexplained weight loss        Past Surgical History:   Procedure Laterality Date    COLON SURGERY      COLONOSCOPY      COLONOSCOPY N/A 2022    Procedure: COLONOSCOPY INTO CECUM WITH HOT SNARE POLYPECTOMY;  Surgeon: Albert Gallo MD;  Location: [facility] ENDOSCOPY;  Service: Gastroenterology;  Laterality: N/A;  PRE- GI BLEED  POST- DIVERTICULOSIS, POLYP    ENDOSCOPY N/A 12/10/2022    Procedure:  ESOPHAGOGASTRODUODENOSCOPY;  Surgeon: Albert Gallo MD;  Location: [facility] ENDOSCOPY;  Service: Gastroenterology;  Laterality: N/A;  PRE- DARK STOOLS  POST- BARRETTS ESOPHAGUS    FRACTURE SURGERY  1980    for broken arm    FRACTURE SURGERY      for broken hand    INCISION AND DRAINAGE ABSCESS  2015    anal    PROSTATE SURGERY      SMALL INTESTINE SURGERY      TONSILLECTOMY               IRF OT ASSESSMENT FLOWSHEET (last 12 hours)       IRF OT Evaluation and Treatment       Row Name 11/10/23 1407 11/10/23 1146       OT Time and Intention    Document Type daily treatment  -KP daily treatment  -KP    Mode of Treatment individual therapy;occupational therapy  -KP individual therapy;occupational therapy  -KP    Patient Effort excellent  -KP excellent  -KP    Symptoms Noted During/After Treatment none  -KP none  -KP      Row Name 11/10/23 1407 11/10/23 1146       General Information    Patient/Family/Caregiver Comments/Observations pt in bed  -KP pt sitting up in bed getting dressed  -KP    Existing Precautions/Restrictions fall  -KP fall  -KP      Row Name 11/10/23 1407 11/10/23 1146       Pain Assessment    Pretreatment Pain Rating 0/10 - no pain  -KP 0/10 - no pain  -KP    Posttreatment Pain Rating 0/10 - no pain  -KP 0/10 - no pain  -KP      Row Name 11/10/23 1407 11/10/23 1146       Cognition/Psychosocial    Affect/Mental Status (Cognition) WFL  -KP WFL  -KP    Orientation Status (Cognition) oriented x 3  -KP oriented x 3  -KP    Follows Commands (Cognition) follows one-step commands;over 90% accuracy  -KP follows one-step commands;over 90% accuracy  -KP    Personal Safety Interventions fall prevention program maintained;gait belt;muscle strengthening facilitated;nonskid shoes/slippers when out of bed  -KP fall prevention program maintained;gait belt;muscle strengthening facilitated;nonskid shoes/slippers when out of bed  -KP      Row Name 11/10/23 1407 11/10/23 1146       Vision Assessment/Intervention    Visual  Impairment/Limitations diplopia  - diplopia  -    Vision Assessment Comment already wore his eye patch today and is time for her break now from the eye patch  - --      Row Name 11/10/23 1146          Bathing    Comment (Bathing) pt declines shower today, reports doesn't want to shower today. took one yesterday w this OT A  -       Row Name 11/10/23 1146          Upper Body Dressing    Comment (Upper Body Dressing) pt already dressed in clean clothes upon OT arrival  -       Row Name 11/10/23 1146          Lower Body Dressing    [place] Level (Lower Body Dressing) don;shoes/slippers;set up;standby assist  -     Position (Lower Body Dressing) edge of bed sitting  -       Row Name 11/10/23 1146          Grooming    [place] Level (Grooming) grooming skills;oral care regimen;set up;standby assist  -     Position (Grooming) sink side;supported standing  -       Row Name 11/10/23 1146          Toileting    Comment (Toileting) pt did not need to use the BR  -       Row Name 11/10/23 1407 11/10/23 1146       Bed Mobility    Supine-Sit [place] (Bed Mobility) standby assist  - standby assist  -    Sit-Supine [place] (Bed Mobility) -- standby assist  -      Row Name 11/10/23 1146          Functional Mobility    Functional Mobility- Ind. Level set up required;contact guard assist  -     Functional Mobility- Device walker, front-wheeled  -     Functional Mobility- Comment in room, to BR.  -       Row Name 11/10/23 1407          Bed-Chair Transfer    Bed-Chair [place] (Transfers) set up;contact guard  -     Assistive Device (Bed-Chair Transfers) wheelchair  -       Row Name 11/10/23 1407 11/10/23 1146       Sit-Stand Transfer    Sit-Stand [place] (Transfers) set up;contact guard  - set up;contact guard  -    Assistive Device (Sit-Stand Transfers) wheelchair  - wheelchair  -      Row Name 11/10/23 1407 11/10/23 1146       Stand-Sit Transfer    Stand-Sit  "[place] (Transfers) set up;contact guard  - set up;contact guard  -    Assistive Device (Stand-Sit Transfers) wheelchair  - wheelchair  -      Row Name 11/10/23 1146          Shower Transfer    Comment, (Shower Transfer) pt did not want to shower today  -       Row Name 11/10/23 1407          Motor Skills    Therapeutic Exercise hand  -       Row Name 11/10/23 1146          Shoulder (Therapeutic Exercise)    Shoulder Strengthening (Therapeutic Exercise) bilateral;flexion;extension;scapular stabilization;sitting;10 repetitions;3 sets  2.5 dowel eugenio.  -       Row Name 11/10/23 1407 11/10/23 1146       Elbow/Forearm (Therapeutic Exercise)    Elbow/Forearm Strengthening (Therapeutic Exercise) right;left;flexion;extension;supination;pronation;sitting;3 lb free weight;10 repetitions;2 sets  -KP left;right;flexion;extension;supination;pronation;sitting;3 lb free weight;10 repetitions;3 sets  -      Row Name 11/10/23 1407 11/10/23 1146       Wrist (Therapeutic Exercise)    Wrist Strengthening (Therapeutic Exercise) left;right;flexion;extension;3 lb free weight;10 repetitions;2 sets  -KP left;right;flexion;extension;3 lb free weight;10 repetitions;3 sets  -      Row Name 11/10/23 1407          Hand (Therapeutic Exercise)    Hand (Therapeutic Exercise) strengthening exercise  -     Hand Strengthening (Therapeutic Exercise) left;finger flexion;finger extension; strengthening;thumb opposition;thumb flexion;therapy putty;red;10 repetitions;3 sets  rolls putty, pinches putty w each digit, squeezes w mult digits to incr  pinch as pt reports diff w using his cell phone and L hand digits feel \"different \" per pt report.  -       Row Name 11/10/23 1407 11/10/23 1146       Balance    Static Sitting Balance independent  - independent  -    Dynamic Sitting Balance standby assist  - standby assist  -    Position, Sitting Balance sitting in chair  - sitting in chair  -    Static Standing Balance " -- contact guard;standby assist  -    Dynamic Standing Balance -- contact guard  -    Position/Device Used, Standing Balance -- --  at counter top  -    Comment, Balance -- pt stood at the counter top completing reaching task for round rings and inserting onto diagonal rods to incr dyn standing balance.  -      Row Name 11/10/23 1407 11/10/23 1146       Positioning and Restraints    Pre-Treatment Position in bed  -KP in bed  -KP    Post Treatment Position wheelchair  -KP bed  -KP    In Bed -- call light within reach;encouraged to call for assist;exit alarm on;fowlers  -KP    In Wheelchair sitting;call light within reach;encouraged to call for assist;exit alarm on  - --      Row Name 11/10/23 1146          Daily Progress Summary (OT)    Overall Progress Toward Functional Goals (OT) progressing toward functional goals as expected  -               User Key  (r) = Recorded By, (t) = Taken By, (c) = Cosigned By      Initials Name Effective Dates    Divya Murillo, OTR 07/11/23 -                      Occupational Therapy Education       Title: PT OT SLP Therapies (Done)       Topic: Occupational Therapy (Done)       Point: ADL training (Done)       Description:   Instruct learner(s) on proper safety adaptation and remediation techniques during self care or transfers.   Instruct in proper use of assistive devices.                  Learning Progress Summary             Patient Acceptance, E, VU by CC at 11/7/2023 1214    Comment: Explanation of OT services, evaluation results and goal setting                         Point: Home exercise program (Done)       Description:   Instruct learner(s) on appropriate technique for monitoring, assisting and/or progressing therapeutic exercises/activities.                  Learning Progress Summary             Patient Acceptance, E, VU by CC at 11/7/2023 0188    Comment: Explanation of OT services, evaluation results and goal setting                         Point:  Precautions (Done)       Description:   Instruct learner(s) on prescribed precautions during self-care and functional transfers.                  Learning Progress Summary             Patient Acceptance, E, VU by  at 11/7/2023 1649    Comment: Explanation of OT services, evaluation results and goal setting                         Point: Body mechanics (Done)       Description:   Instruct learner(s) on proper positioning and spine alignment during self-care, functional mobility activities and/or exercises.                  Learning Progress Summary             Patient Acceptance, E, VU by CC at 11/7/2023 1649    Comment: Explanation of OT services, evaluation results and goal setting                                         User Key       Initials Effective Dates Name Provider Type Discipline     06/16/21 -  Rosa Chris OTR Occupational Therapist OT                        OT Recommendation and Plan            Daily Progress Summary (OT)  Overall Progress Toward Functional Goals (OT): progressing toward functional goals as expected            Time Calculation:      Time Calculation- OT       Row Name 11/10/23 1418 11/10/23 1204          Time Calculation- OT    OT Start Time 1230  -KP 0800  -KP     OT Stop Time 1300  -KP 0830  -KP     OT Time Calculation (min) 30 min  -KP 30 min  -KP               User Key  (r) = Recorded By, (t) = Taken By, (c) = Cosigned By      Initials Name Provider Type     Divya Giron OTR Occupational Therapist                  Therapy Charges for Today       Code Description Service Date Service Provider Modifiers Qty    98693700396 HC OT SELF CARE/MGMT/TRAIN EA 15 MIN 11/9/2023 Divya Giron OTR GO 1    75403146160 HC OT THER PROC EA 15 MIN 11/9/2023 Divya Giron OTR GO 1    15318785721 HC OT NEUROMUSC RE EDUCATION EA 15 MIN 11/9/2023 Divya Giron OTR GO 2    91888540725 HC OT NEUROMUSC RE EDUCATION EA 15 MIN 11/10/2023 Divya Giron  Oneal, BEATA GO 2    [id number]  OT NEUROMUSC RE EDUCATION EA 15 MIN 11/10/2023 Divya Giron OTR GO 2                     BEATA Tijerina  11/10/2023

## 2023-11-10 NOTE — PLAN OF CARE
Goal Outcome Evaluation:  Plan of Care Reviewed With: patient        Progress: improving  Outcome Evaluation: Pt is A&OX4, calm and cooperative with flat affect. Con of B&B, last BM 11/10. Pt can be anxious at times. Meds whole with water. No unsafe behaviors noted. Assist X1 to wc. Pt reported N/T to L side, face, hand, LE. Double vision noted, pt to alternate eye patch, see orders. No c/o thus far. Pt participated with all therapies today.

## 2023-11-10 NOTE — PROGRESS NOTES
Inpatient Rehabilitation Plan of Care Note    Plan of Care  Care Plan Reviewed - No updates at this time.    Safety    Performed Intervention(s)  Items within reach  Safety rounds  Bed alarm/chair alarm      Psychosocial    Performed Intervention(s)  Medication  verbalies needs and concerns  suicidal precautions placed today      Sphincter Control    Performed Intervention(s)  Therapeutic exercises/modalities  Elimination schedule  Encourage appropriate diet    Signed by: Nohemi Gonzales RN

## 2023-11-10 NOTE — THERAPY TREATMENT NOTE
Inpatient Rehabilitation - Occupational Therapy Treatment Note    [facility]     Patient Name: Flako Coreas  : 1965  MRN: [id number]    Today's Date: 11/10/2023                 Admit Date: 2023       No diagnosis found.    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    Acute CVA (cerebrovascular accident)    HTN (hypertension)    Late effect of cerebrovascular accident (CVA)    Concussion    Fall    Head injury, closed, with concussion    Alcohol abuse       Past Medical History:   Diagnosis Date    ADHD (attention deficit hyperactivity disorder)     On going issue    Anxiety     Lepe's esophagus     Benign prostatic hyperplasia Surgery in 2021    Clotting disorder Intestinal    Depression     Diverticulitis     Diverticulosis     Duodenitis     Enteritis     Failure to thrive (child)     Family history of blood clots     GERD (gastroesophageal reflux disease)     Hyperlipidemia     Hypertension     Hypertensive emergency     Low testosterone     Microcytosis     Pancreatitis     Pneumonia     PONV (postoperative nausea and vomiting)     Seasonal affective disorder     Shoulder injury     Stroke     Unexplained weight loss        Past Surgical History:   Procedure Laterality Date    COLON SURGERY      COLONOSCOPY      COLONOSCOPY N/A 2022    Procedure: COLONOSCOPY INTO CECUM WITH HOT SNARE POLYPECTOMY;  Surgeon: Albert Gallo MD;  Location: [facility] ENDOSCOPY;  Service: Gastroenterology;  Laterality: N/A;  PRE- GI BLEED  POST- DIVERTICULOSIS, POLYP    ENDOSCOPY N/A 12/10/2022    Procedure:  ESOPHAGOGASTRODUODENOSCOPY;  Surgeon: Albert Gallo MD;  Location: [facility] ENDOSCOPY;  Service: Gastroenterology;  Laterality: N/A;  PRE- DARK STOOLS  POST- BARRETTS ESOPHAGUS    FRACTURE SURGERY  1980    for broken arm    FRACTURE SURGERY      for broken hand    INCISION AND DRAINAGE ABSCESS  2015    anal    PROSTATE SURGERY      SMALL INTESTINE SURGERY      TONSILLECTOMY               IRF OT ASSESSMENT FLOWSHEET (last 12 hours)       IRF OT Evaluation and Treatment       Row Name 11/10/23 1146          OT Time and Intention    Document Type daily treatment  -     Mode of Treatment individual therapy;occupational therapy  -KP     Patient Effort excellent  -KP     Symptoms Noted During/After Treatment none  -       Row Name 11/10/23 1146          General Information    Patient/Family/Caregiver Comments/Observations pt sitting up in bed getting dressed  -     Existing Precautions/Restrictions fall  -       Row Name 11/10/23 1146          Pain Assessment    Pretreatment Pain Rating 0/10 - no pain  -     Posttreatment Pain Rating 0/10 - no pain  -       Row Name 11/10/23 1146          Cognition/Psychosocial    Affect/Mental Status (Cognition) WFL  -     Orientation Status (Cognition) oriented x 3  -KP     Follows Commands (Cognition) follows one-step commands;over 90% accuracy  -     Personal Safety Interventions fall prevention program maintained;gait belt;muscle strengthening facilitated;nonskid shoes/slippers when out of bed  -       Row Name 11/10/23 1146          Vision Assessment/Intervention    Visual Impairment/Limitations diplopia  -       Row Name 11/10/23 1146          Bathing    Comment (Bathing) pt declines shower today, reports doesn't want to shower today. took one yesterday w this OT A  -       Row Name 11/10/23 1146          Upper Body Dressing    Comment (Upper Body Dressing) pt already dressed in clean clothes upon OT arrival  -       Row Name 11/10/23 1146          Lower  Body Dressing    Eugene Level (Lower Body Dressing) don;shoes/slippers;set up;standby assist  -     Position (Lower Body Dressing) edge of bed sitting  -       Row Name 11/10/23 1146          Grooming    Eugene Level (Grooming) grooming skills;oral care regimen;set up;standby assist  -     Position (Grooming) sink side;supported standing  -       Row Name 11/10/23 1146          Toileting    Comment (Toileting) pt did not need to use the BR  -[facility] Name 11/10/23 1146          Bed Mobility    Supine-Sit Eugene (Bed Mobility) standby assist  -     Sit-Supine Eugene (Bed Mobility) standby assist  -[facility] Name 11/10/23 1146          Functional Mobility    Functional Mobility- Ind. Level set up required;contact guard assist  -     Functional Mobility- Device walker, front-wheeled  -     Functional Mobility- Comment in room, to BR.  -[facility] Name 11/10/23 1146          Sit-Stand Transfer    Sit-Stand Eugene (Transfers) set up;contact guard  -     Assistive Device (Sit-Stand Transfers) wheelchair  -[facility] Name 11/10/23 1146          Stand-Sit Transfer    Stand-Sit Eugene (Transfers) set up;contact guard  -     Assistive Device (Stand-Sit Transfers) wheelchair  -[facility] Name 11/10/23 1146          Shower Transfer    Comment, (Shower Transfer) pt did not want to shower today  -[facility] Name 11/10/23 1146          Shoulder (Therapeutic Exercise)    Shoulder Strengthening (Therapeutic Exercise) bilateral;flexion;extension;scapular stabilization;sitting;10 repetitions;3 sets  2.5 dowel eugenio.  -       Row Name 11/10/23 1146          Elbow/Forearm (Therapeutic Exercise)    Elbow/Forearm Strengthening (Therapeutic Exercise) left;right;flexion;extension;supination;pronation;sitting;3 lb free weight;10 repetitions;3 sets  -       Row Name 11/10/23 1146          Wrist (Therapeutic Exercise)    Wrist Strengthening (Therapeutic Exercise)  left;right;flexion;extension;3 lb free weight;10 repetitions;3 sets  -       Row Name 11/10/23 1146          Balance    Static Sitting Balance independent  -     Dynamic Sitting Balance standby assist  -     Position, Sitting Balance sitting in chair  -     Static Standing Balance contact guard;standby assist  -     Dynamic Standing Balance contact guard  -     Position/Device Used, Standing Balance --  at counter top  -     Comment, Balance pt stood at the counter top completing reaching task for round rings and inserting onto diagonal rods to incr dyn standing balance.  -       Row Name 11/10/23 1146          Positioning and Restraints    Pre-Treatment Position in bed  -     Post Treatment Position bed  -     In Bed call light within reach;encouraged to call for assist;exit alarm on;fowlers  -       Row Name 11/10/23 1146          Daily Progress Summary (OT)    Overall Progress Toward Functional Goals (OT) progressing toward functional goals as expected  -               User Key  (r) = Recorded By, (t) = Taken By, (c) = Cosigned By      Initials Name Effective Dates     Divya Giron, OTR 07/11/23 -                      Occupational Therapy Education       Title: PT OT SLP Therapies (Done)       Topic: Occupational Therapy (Done)       Point: ADL training (Done)       Description:   Instruct learner(s) on proper safety adaptation and remediation techniques during self care or transfers.   Instruct in proper use of assistive devices.                  Learning Progress Summary             Patient Acceptance, E, VU by CC at 11/7/2023 2009    Comment: Explanation of OT services, evaluation results and goal setting                         Point: Home exercise program (Done)       Description:   Instruct learner(s) on appropriate technique for monitoring, assisting and/or progressing therapeutic exercises/activities.                  Learning Progress Summary             Patient  Acceptance, E, VU by  at 11/7/2023 1649    Comment: Explanation of OT services, evaluation results and goal setting                         Point: Precautions (Done)       Description:   Instruct learner(s) on prescribed precautions during self-care and functional transfers.                  Learning Progress Summary             Patient Acceptance, E, VU by  at 11/7/2023 1649    Comment: Explanation of OT services, evaluation results and goal setting                         Point: Body mechanics (Done)       Description:   Instruct learner(s) on proper positioning and spine alignment during self-care, functional mobility activities and/or exercises.                  Learning Progress Summary             Patient Acceptance, E, VU by  at 11/7/2023 1649    Comment: Explanation of OT services, evaluation results and goal setting                                         User Key       Initials Effective Dates Name Provider Type [facility] 06/16/21 -  Rosa Chris OTR Occupational Therapist OT                        OT Recommendation and Plan            Daily Progress Summary (OT)  Overall Progress Toward Functional Goals (OT): progressing toward functional goals as expected            Time Calculation:      Time Calculation- OT       Row Name 11/10/23 1204             Time Calculation- OT    OT Start Time 0800  -      OT Stop Time 0830  -      OT Time Calculation (min) 30 min  -                User Key  (r) = Recorded By, (t) = Taken By, (c) = Cosigned By      Initials Name Provider Type     Divya Giron OTR Occupational Therapist                  Therapy Charges for Today       Code Description Service Date Service Provider Modifiers Qty    17646334010 HC OT SELF CARE/MGMT/TRAIN EA 15 MIN 11/9/2023 Divya Giron OTR GO 1    12977131235 HC OT THER PROC EA 15 MIN 11/9/2023 Divya Giron OTR GO 1    35906596838 HC OT NEUROMUSC RE EDUCATION EA 15 MIN 11/9/2023 Bree  Divya No, OTR GO 2    [id number]  OT NEUROMUSC RE EDUCATION EA 15 MIN 11/10/2023 Divya Giron, SERAR GO 2                     BEATA Tijerina  11/10/2023

## 2023-11-10 NOTE — PLAN OF CARE
Goal Outcome Evaluation:  Plan of Care Reviewed With: patient           Outcome Evaluation: Slept well.Continent B&B.Assist x1.Still c/o tingling and numbness on left hand ,cheekand toungue.To have eye patches 2hrly in both eyes on and off during the day.Refused SCDs at night despite educating patient on the need.

## 2023-11-10 NOTE — THERAPY TREATMENT NOTE
Inpatient Rehabilitation - Speech Language Pathology Treatment Note    [facility]     Patient Name: Flako Coreas  : 1965  MRN: [id number]    Today's Date: 11/10/2023                   Admit Date: 2023       Visit Dx:    No diagnosis found.    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    Acute CVA (cerebrovascular accident)    HTN (hypertension)    Late effect of cerebrovascular accident (CVA)    Concussion    Fall    Head injury, closed, with concussion    Alcohol abuse       Past Medical History:   Diagnosis Date    ADHD (attention deficit hyperactivity disorder)     On going issue    Anxiety     Lepe's esophagus     Benign prostatic hyperplasia Surgery in 2021    Clotting disorder Intestinal    Depression     Diverticulitis     Diverticulosis     Duodenitis     Enteritis     Failure to thrive (child)     Family history of blood clots     GERD (gastroesophageal reflux disease)     Hyperlipidemia     Hypertension     Hypertensive emergency     Low testosterone     Microcytosis     Pancreatitis     Pneumonia     PONV (postoperative nausea and vomiting)     Seasonal affective disorder     Shoulder injury     Stroke     Unexplained weight loss        Past Surgical History:   Procedure Laterality Date    COLON SURGERY      COLONOSCOPY      COLONOSCOPY N/A 2022    Procedure: COLONOSCOPY INTO CECUM WITH HOT SNARE POLYPECTOMY;  Surgeon: Albert Gallo MD;  Location: [facility] ENDOSCOPY;  Service: Gastroenterology;  Laterality: N/A;  PRE- GI BLEED  POST- DIVERTICULOSIS, POLYP    ENDOSCOPY N/A  12/10/2022    Procedure: ESOPHAGOGASTRODUODENOSCOPY;  Surgeon: Albert Gallo MD;  Location: [facility] ENDOSCOPY;  Service: Gastroenterology;  Laterality: N/A;  PRE- DARK STOOLS  POST- BARRETTS ESOPHAGUS    FRACTURE SURGERY  1980    for broken arm    FRACTURE SURGERY      for broken hand    INCISION AND DRAINAGE ABSCESS  2015    anal    PROSTATE SURGERY      SMALL INTESTINE SURGERY      TONSILLECTOMY         SLP Recommendation and Plan                                                               SLP EVALUATION (last 72 hours)       SLP SLC Evaluation       Row Name 11/10/23 1330 11/10/23 1030 11/09/23 1330 11/09/23 1030 11/08/23 1330       Communication Assessment/Intervention    Document Type therapy note (daily note)  -AL therapy note (daily note)  -AL therapy note (daily note)  -AL therapy note (daily note)  -AL therapy note (daily note)  -AL    Patient/Family/Caregiver Comments/Observations Pt participated well.  -AL Pt participated well.  -AL Pt participated well.  -AL Pt participated well. Wore eye patch on right eye due to double vision.  -AL Pt participated well.  -AL       Pain Scale: Numbers Pre/Post-Treatment    Pretreatment Pain Rating 0/10 - no pain  -AL 0/10 - no pain  -AL 0/10 - no pain  -AL 0/10 - no pain  -AL 0/10 - no pain  -AL    Posttreatment Pain Rating -- 0/10 - no pain  -AL 0/10 - no pain  -AL -- --      Row Name 11/08/23 1030                   Communication Assessment/Intervention    Document Type therapy note (daily note)  -AL        Patient Observations alert;cooperative  -AL        Patient/Family/Caregiver Comments/Observations Pt participated well. He became tearful regarding deficits x1. SLP provided support. Pt on suicide watch today; CNA present for session to monitor.  -AL           Pain Scale: Numbers Pre/Post-Treatment    Pretreatment Pain Rating 0/10 - no pain  -AL                  User Key  (r) = Recorded By, (t) = Taken By, (c) = Cosigned By      Initials Name Effective Dates     "Yoselyn Shah, MS CCC-SLP 06/16/21 -                        EDUCATION    The patient has been educated in the following areas:       Cognitive Impairment Communication Impairment.             SLP GOALS       Row Name 11/10/23 1330 11/10/23 1030 11/09/23 1330       Word Retrieval Skills Goal 1 (SLP)    Improve Word Retrieval Skills By Goal 1 (SLP) low frequency;responsive naming task;confrontational naming task;supplying an antonym;supplying a synonym;completing a divergent task;80%;independently (over 90% accuracy)  -AL -- low frequency;responsive naming task;confrontational naming task;supplying an antonym;supplying a synonym;completing a divergent task;80%;independently (over 90% accuracy)  -AL    Time Frame (Word Retrieval Goal 1, SLP) 1 week  -AL -- 1 week  -AL    Progress/Outcomes (Word Retrieval Goal 1, SLP) good progress toward goal  -AL -- good progress toward goal  -AL    Comment (Word Retrieval Goal 1, SLP) Responsive naming of words ending in \"one\": 70% with NO cues, 100% with MIN cues.  -AL -- Responsive naming of words ending in \"ize\": 90% with NO cues, 100% with MIN cues. Pt read a short story and attempted to retell to clinician. He required MIN-MOD cues to provide relevant details and NO cues for word finding. Noted he reduced sentence length and exhibited hesitations. Question impact of reduced immediate memory vs. thought organization on this task.  -AL       Attention Goal 1 (SLP)    Improve Attention by Goal 1 (SLP) complete divided attention task;complete selective attention task;90%;independently (over 90% accuracy)  -AL -- complete divided attention task;complete selective attention task;90%;independently (over 90% accuracy)  -AL    Time Frame (Attention Goal 1, SLP) 1 week  -AL -- 1 week  -AL    Progress/Outcomes (Attention Goal 1, SLP) good progress toward goal  -AL -- good progress toward goal  -AL    Comment (Attention Goal 1, SLP) Attention to detail for written \"If/then\" " "directions: 7/8 with NO cues, 8/8 with MIN cues.  -AL -- Written \"if/then\" directions: 7/8 with NO cues, 8/8 with MOD cues on initial trial. Pt benefited from cues to slow down and to talk through instructions out-loud.  -AL       Memory Skills Goal 1 (SLP)    Improve Memory Skills Through Goal 1 (SLP) recalling unrelated word lists with an imposed delay;listen to a paragraph and answer questions;repeat list in sequential order;recall details from a word list;use memory strategies;90%;independently (over 90% accuracy)  -AL -- --    Time Frame (Memory Skills Goal 1, SLP) 1 week  -AL -- --    Progress/Outcomes (Memory Skills Goal 1, SLP) good progress toward goal  -AL -- --    Comment (Memory Skills Goal 1, SLP) Working memory for 4 word ordering task; 30% with NO cues, 70% with MIN cues, 100% with MOD cues. Immediate memory for recall of voicemails using note-taking strategy: 85% with NO cues, 100% with MIN cues.  -AL -- --       Functional Math Skills Goal 1 (SLP)    Improve Functional Math Skills Through Goal 1 (SLP) -- complete functional math task;90%;independently (over 90% accuracy)  -AL --    Time Frame (Functional Math Skills Goal 1, SLP) -- 1 week  -AL --    Progress/Outcomes (Functional Math Skills Goal 1, SLP) -- good progress toward goal  -AL --    Comment (Functional Math Skills Goal 1, SLP) -- Pt completed checkbook balancing task with MIN cue x1 for organization. Pt was 60% accurate for calculations with NO cues, 100% with MIN-MOD cues. Noted increased frustration at end of task.  -AL --       Executive Functional Skills Goal 1 (SLP)    Improve Executive Function Skills Goal 1 (SLP) identify strategies, strengths, limitations;home management activity;perform self-evaluation;90%;independently (over 90% accuracy)  -AL -- --    Time Frame (Executive Function Skills Goal 1, SLP) 1 week  -AL -- --    Progress/Outcomes (Executive Function Skills Goal 1, SLP) good progress toward goal  -AL -- --    Comment " (Executive Function Skills Goal 1, SLP) Pt independently utilized note-taking and rehearsal to assist with memory.  -AL -- --      Row Name 11/09/23 1030 11/08/23 1330 11/08/23 1030       Word Retrieval Skills Goal 1 (SLP)    Improve Word Retrieval Skills By Goal 1 (SLP) -- low frequency;responsive naming task;confrontational naming task;supplying an antonym;supplying a synonym;completing a divergent task;80%;independently (over 90% accuracy)  -AL low frequency;responsive naming task;confrontational naming task;supplying an antonym;supplying a synonym;completing a divergent task;80%;independently (over 90% accuracy)  -AL    Time Frame (Word Retrieval Goal 1, SLP) -- 1 week  -AL 1 week  -AL    Progress/Outcomes (Word Retrieval Goal 1, SLP) -- goal ongoing  -AL goal ongoing  -AL    Comment (Word Retrieval Goal 1, SLP) -- Responsive naming for 7 letter words given definition: 16/18 with NO cues, 18/18 with MIN-MOD cues.  -AL Responsive naming for high-level vocabulary given ending (ine): 60% with NO cues, 80% with MIN cues, 90% with MOD cues.  -AL       Attention Goal 1 (SLP)    Comment (Attention Goal 1, SLP) -- -- Discussed impact of frustration/anxiety on concentration and word retrieval. Pt reported he does not feel anxious during cognitive tasks, but notes when he is frustrated he loses his train of thought.  Discussed impact of relaxation techniques (deep breathing) on assisting with concentration. Plan to discuss further in next session.  -AL       Memory Skills Goal 1 (SLP)    Improve Memory Skills Through Goal 1 (SLP) recalling unrelated word lists with an imposed delay;listen to a paragraph and answer questions;repeat list in sequential order;recall details from a word list;use memory strategies;90%;independently (over 90% accuracy)  -AL -- recalling unrelated word lists with an imposed delay;listen to a paragraph and answer questions;repeat list in sequential order;recall details from a word list;use memory  strategies;90%;independently (over 90% accuracy)  -AL    Time Frame (Memory Skills Goal 1, SLP) 1 week  -AL -- 1 week  -AL    Progress/Outcomes (Memory Skills Goal 1, SLP) goal ongoing  -AL -- goal ongoing  -AL    Comment (Memory Skills Goal 1, SLP) Working memory for 4 unit list retention task: 30% with NO cues, 100% with MIN-MOD cues. Immediate memory for voicemails using note takin% with NO cues, 100% with MIN cues. Pt reported he has noticed hearing loss prior to admission. Stated he had difficulty hearing people in restaurants. Reports he has to read lips while he is listening to clinician. SLP encouraged pt to have hearing tested at discharge. Discussed impact of reduced hearing on cognitive resource allocation.  -AL -- Working memory for 4 unit list retention task: 80% with NO cues, 100% with MIN cues. 5 unit list retention: 40% with NO cues, 60% with MIN cues, 100% with MOD-MAX cues. Pt benefited from use of categorizing strategy. Immediate memory for voicemails: 60% with one repetition, 80% with 2 repetitions, 100% with 3 repetitions of stimulus.  -AL       Functional Math Skills Goal 1 (SLP)    Improve Functional Math Skills Through Goal 1 (SLP) -- complete functional math task;90%;independently (over 90% accuracy)  -AL --    Time Frame (Functional Math Skills Goal 1, SLP) -- 1 week  -AL --    Progress/Outcomes (Functional Math Skills Goal 1, SLP) -- good progress toward goal  -AL --    Comment (Functional Math Skills Goal 1, SLP) -- Grocery prices task: 80% with NO cues, 100% with MIN cues. Moderately complex temporal problem solving (auditory): 50% with NO cues, 90% with repetition of stimulus, 100% with MIN cues.  -AL --       Executive Functional Skills Goal 1 (SLP)    Comment (Executive Function Skills Goal 1, SLP) -- -- SLP reviewed results of CLQT from yesterday. Discussed treatment plan. Pt reported feeling frustrated with deficits.  -AL              User Key  (r) = Recorded By, (t) = Taken  By, (c) = Cosigned By      Initials Name Provider Type    Yoselyn Shah, MS CCC-SLP Speech and Language Pathologist                                Time Calculation:        Time Calculation- SLP       Row Name 11/10/23 1520 11/10/23 1222          Time Calculation- SLP    SLP Start Time 1330  -AL 1030  -AL     SLP Stop Time 1400  -AL 1100  -AL     SLP Time Calculation (min) 30 min  -AL 30 min  -AL     SLP Received On 11/10/23  -AL 11/10/23  -AL               User Key  (r) = Recorded By, (t) = Taken By, (c) = Cosigned By      Initials Name Provider Type    Yoselyn Shah, MS CCC-SLP Speech and Language Pathologist                      Therapy Charges for Today       Code Description Service Date Service Provider Modifiers Qty    68389181427 HC ST DEV OF COGN SKILLS INITIAL 15 MIN 11/9/2023 Yoselyn Gudino, MS CCC-SLP  1    93621786950 HC ST DEV OF COGN SKILLS EACH ADDT'L 15 MIN 11/9/2023 Yoselyn Gudino, MS CCC-SLP  3    34900501921 HC ST DEV OF COGN SKILLS INITIAL 15 MIN 11/10/2023 Yoselyn Gudino, MS CCC-SLP  1    18325499273 HC ST DEV OF COGN SKILLS EACH ADDT'L 15 MIN 11/10/2023 Yoselyn Gudino, MS CCC-SLP  3                             Yoselyn Gudino MS CCC-SLP  11/10/2023

## 2023-11-11 PROCEDURE — 97116 GAIT TRAINING THERAPY: CPT

## 2023-11-11 PROCEDURE — 97112 NEUROMUSCULAR REEDUCATION: CPT

## 2023-11-11 PROCEDURE — 97129 THER IVNTJ 1ST 15 MIN: CPT

## 2023-11-11 PROCEDURE — 97530 THERAPEUTIC ACTIVITIES: CPT

## 2023-11-11 PROCEDURE — 97535 SELF CARE MNGMENT TRAINING: CPT

## 2023-11-11 PROCEDURE — 97110 THERAPEUTIC EXERCISES: CPT

## 2023-11-11 PROCEDURE — 97130 THER IVNTJ EA ADDL 15 MIN: CPT

## 2023-11-11 RX ADMIN — HYDRALAZINE HYDROCHLORIDE 100 MG: 50 TABLET, FILM COATED ORAL at 07:26

## 2023-11-11 RX ADMIN — FOLIC ACID 1 MG: 1 TABLET ORAL at 08:09

## 2023-11-11 RX ADMIN — HYDROXYZINE HYDROCHLORIDE 50 MG: 25 TABLET ORAL at 12:03

## 2023-11-11 RX ADMIN — MULTIPLE VITAMINS W/ MINERALS TAB 1 TABLET: TAB at 08:10

## 2023-11-11 RX ADMIN — TICAGRELOR 60 MG: 60 TABLET ORAL at 20:45

## 2023-11-11 RX ADMIN — AMLODIPINE BESYLATE 5 MG: 5 TABLET ORAL at 08:10

## 2023-11-11 RX ADMIN — LISINOPRIL 40 MG: 40 TABLET ORAL at 08:10

## 2023-11-11 RX ADMIN — HYDROCHLOROTHIAZIDE 12.5 MG: 12.5 TABLET ORAL at 08:09

## 2023-11-11 RX ADMIN — DOCUSATE SODIUM 50MG AND SENNOSIDES 8.6MG 2 TABLET: 8.6; 5 TABLET, FILM COATED ORAL at 20:44

## 2023-11-11 RX ADMIN — Medication 100 MG: at 08:09

## 2023-11-11 RX ADMIN — PANTOPRAZOLE SODIUM 40 MG: 40 TABLET, DELAYED RELEASE ORAL at 07:26

## 2023-11-11 RX ADMIN — ASPIRIN 81 MG: 81 TABLET, COATED ORAL at 08:10

## 2023-11-11 RX ADMIN — CARVEDILOL 6.25 MG: 6.25 TABLET, FILM COATED ORAL at 08:10

## 2023-11-11 RX ADMIN — TICAGRELOR 60 MG: 60 TABLET ORAL at 08:10

## 2023-11-11 RX ADMIN — POTASSIUM CHLORIDE 10 MEQ: 750 TABLET, EXTENDED RELEASE ORAL at 08:09

## 2023-11-11 RX ADMIN — HYDROXYZINE HYDROCHLORIDE 50 MG: 25 TABLET ORAL at 20:44

## 2023-11-11 RX ADMIN — HYDRALAZINE HYDROCHLORIDE 100 MG: 50 TABLET, FILM COATED ORAL at 20:45

## 2023-11-11 RX ADMIN — ATORVASTATIN CALCIUM 80 MG: 80 TABLET, FILM COATED ORAL at 20:45

## 2023-11-11 RX ADMIN — CARVEDILOL 6.25 MG: 6.25 TABLET, FILM COATED ORAL at 16:47

## 2023-11-11 RX ADMIN — Medication 10 MG: at 20:44

## 2023-11-11 RX ADMIN — VILAZODONE HYDROCHLORIDE 40 MG: 40 TABLET, FILM COATED ORAL at 08:09

## 2023-11-11 RX ADMIN — DOCUSATE SODIUM 50MG AND SENNOSIDES 8.6MG 2 TABLET: 8.6; 5 TABLET, FILM COATED ORAL at 08:10

## 2023-11-11 RX ADMIN — HYDRALAZINE HYDROCHLORIDE 100 MG: 50 TABLET, FILM COATED ORAL at 14:50

## 2023-11-11 NOTE — THERAPY TREATMENT NOTE
Inpatient Rehabilitation - Occupational Therapy Treatment Note    [facility]     Patient Name: Flako Coreas  : 1965  MRN: [id number]    Today's Date: 2023                 Admit Date: 2023       No diagnosis found.    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    Acute CVA (cerebrovascular accident)    HTN (hypertension)    Late effect of cerebrovascular accident (CVA)    Concussion    Fall    Head injury, closed, with concussion    Alcohol abuse       Past Medical History:   Diagnosis Date    ADHD (attention deficit hyperactivity disorder)     On going issue    Anxiety     Lepe's esophagus     Benign prostatic hyperplasia Surgery in 2021    Clotting disorder Intestinal    Depression     Diverticulitis     Diverticulosis     Duodenitis     Enteritis     Failure to thrive (child)     Family history of blood clots     GERD (gastroesophageal reflux disease)     Hyperlipidemia     Hypertension     Hypertensive emergency     Low testosterone     Microcytosis     Pancreatitis     Pneumonia     PONV (postoperative nausea and vomiting)     Seasonal affective disorder     Shoulder injury     Stroke     Unexplained weight loss        Past Surgical History:   Procedure Laterality Date    COLON SURGERY      COLONOSCOPY      COLONOSCOPY N/A 2022    Procedure: COLONOSCOPY INTO CECUM WITH HOT SNARE POLYPECTOMY;  Surgeon: Albert Gallo MD;  Location: [facility] ENDOSCOPY;  Service: Gastroenterology;  Laterality: N/A;  PRE- GI BLEED  POST- DIVERTICULOSIS, POLYP    ENDOSCOPY N/A 12/10/2022    Procedure:  ESOPHAGOGASTRODUODENOSCOPY;  Surgeon: Albert Gallo MD;  Location: [facility] ENDOSCOPY;  Service: Gastroenterology;  Laterality: N/A;  PRE- DARK STOOLS  POST- BARRETTS ESOPHAGUS    FRACTURE SURGERY  1980    for broken arm    FRACTURE SURGERY      for broken hand    INCISION AND DRAINAGE ABSCESS  2015    anal    PROSTATE SURGERY      SMALL INTESTINE SURGERY      TONSILLECTOMY               IRF OT ASSESSMENT FLOWSHEET (last 12 hours)       IRF OT Evaluation and Treatment       Row Name 11/11/23 1332 11/11/23 1109       OT Time and Intention    Document Type daily treatment  -MW daily treatment  -MW    Mode of Treatment occupational therapy;individual therapy  -MW occupational therapy;individual therapy  -MW    Patient Effort excellent  -MW excellent  -MW    Symptoms Noted During/After Treatment none  -MW none  -MW      Row Name 11/11/23 1332 11/11/23 1109       General Information    Patient Profile Reviewed yes  -MW yes  -MW    Patient/Family/Caregiver Comments/Observations PM supine in bed on arrival  -MW AM supine on arrival  -MW    Existing Precautions/Restrictions fall  -MW fall  -MW      Row Name 11/11/23 1332 11/11/23 1109       Pain Assessment    Pretreatment Pain Rating 0/10 - no pain  -MW 0/10 - no pain  -MW    Posttreatment Pain Rating 0/10 - no pain  -MW 0/10 - no pain  -MW      Row Name 11/11/23 1332 11/11/23 1109       Cognition/Psychosocial    Affect/Mental Status (Cognition) WFL  -MW WFL  -MW    Orientation Status (Cognition) oriented x 4  -MW oriented x 4  -MW    Follows Commands (Cognition) follows one-step commands;over 90% accuracy  -MW follows one-step commands;over 90% accuracy  -MW    Personal Safety Interventions gait belt  -MW gait belt;supervised activity;fall prevention program maintained;safety round/check completed  -MW      Row Name 11/11/23 1109          Bathing    [place] Level (Bathing) bathing skills;lower body;upper body;set up;standby assist  -MW     Assistive Device (Bathing)  shower chair;hand held shower spray hose  -     Position (Bathing) supported sitting;supported standing  -       Row Name 11/11/23 1109          Upper Body Dressing    [place] Level (Upper Body Dressing) upper body dressing skills;doff;don;pull over garment;set up assistance  -     Position (Upper Body Dressing) supported sitting  -       Row Name 11/11/23 1332 11/11/23 1109       Lower Body Dressing    [place] Level (Lower Body Dressing) don;shoes/slippers;set up  - don;shoes/slippers;set up;standby assist  -    Position (Lower Body Dressing) -- supported sitting;supported standing  -    Comment (Lower Body Dressing) -- pt able to obtain all clothing propelling in w/c within room with therapist present  -      Row Name 11/11/23 1109          Grooming    [place] Level (Grooming) grooming skills;oral care regimen;set up;standby assist  -     Position (Grooming) sink side;supported standing  -       Row Name 11/11/23 1109          Toileting    Comment (Toileting) declined AM  -       Row Name 11/11/23 1332 11/11/23 1109       Bed Mobility    Supine-Sit [place] (Bed Mobility) standby assist  - standby assist  -[facility] Name 11/11/23 1332          Bed-Chair Transfer    Bed-Chair [place] (Transfers) verbal cues;contact guard  -[facility] Name 11/11/23 1109          Sit-Stand Transfer    Sit-Stand [place] (Transfers) contact guard  -[facility] Name 11/11/23 1109          Stand-Sit Transfer    Stand-Sit [place] (Transfers) contact guard  -[facility] Name 11/11/23 1109          Shower Transfer    Type (Shower Transfer) stand pivot/stand step  -     [place] Level (Shower Transfer) set up;contact guard  -     Assistive Device (Shower Transfer) grab bar, tub/shower  -       Row Name 11/11/23 1332 11/11/23 1109       Motor Skills    Functional Endurance -- demo x3 trialsx2 mins each, level P5 seated at arm bike  -    Motor Control/Coordination  Interventions neuro-muscular re-education  -MW --    Additional Documentation --  pt engaging in upper trunk and core strengthening while seated on mat unsupported with 6lb weight for increased challenge  -MW --      Row Name 11/11/23 1332          Shoulder (Therapeutic Exercise)    Shoulder Strengthening (Therapeutic Exercise) bilateral;flexion;extension;aBduction;aDduction;horizontal aBduction/aDduction;sitting  6lb weight, 3x10 reps  -MW       Row Name 11/11/23 1332          Elbow/Forearm (Therapeutic Exercise)    Elbow/Forearm Strengthening (Therapeutic Exercise) bilateral;flexion;extension;supination;pronation;sitting  6 lb hand weight  -MW       Row Name 11/11/23 1332          Wrist (Therapeutic Exercise)    Wrist (Therapeutic Exercise) strengthening exercise  6lb  -MW       Row Name 11/11/23 1332 11/11/23 1109       Balance    Dynamic Sitting Balance standby assist  -MW --    Static Standing Balance -- contact guard  -MW    Dynamic Standing Balance -- contact guard  -MW      Row Name 11/11/23 1332 11/11/23 1109       Positioning and Restraints    Pre-Treatment Position in bed  -MW in bed  -MW    Post Treatment Position wheelchair  -MW wheelchair  -MW    In Wheelchair with SLP  -MW with SLP  -MW              User Key  (r) = Recorded By, (t) = Taken By, (c) = Cosigned By      Initials Name Effective Dates    Marce Smith, SERA 08/20/21 -                      Occupational Therapy Education       Title: PT OT SLP Therapies (Done)       Topic: Occupational Therapy (Done)       Point: ADL training (Done)       Description:   Instruct learner(s) on proper safety adaptation and remediation techniques during self care or transfers.   Instruct in proper use of assistive devices.                  Learning Progress Summary             Patient Acceptance, E VU by CC at 11/7/2023 2373    Comment: Explanation of OT services, evaluation results and goal setting                         Point: Home exercise program  (Done)       Description:   Instruct learner(s) on appropriate technique for monitoring, assisting and/or progressing therapeutic exercises/activities.                  Learning Progress Summary             Patient Acceptance, E, VU by  at 11/7/2023 1649    Comment: Explanation of OT services, evaluation results and goal setting                         Point: Precautions (Done)       Description:   Instruct learner(s) on prescribed precautions during self-care and functional transfers.                  Learning Progress Summary             Patient Acceptance, E, VU by  at 11/7/2023 1649    Comment: Explanation of OT services, evaluation results and goal setting                         Point: Body mechanics (Done)       Description:   Instruct learner(s) on proper positioning and spine alignment during self-care, functional mobility activities and/or exercises.                  Learning Progress Summary             Patient Acceptance, E, VU by  at 11/7/2023 1649    Comment: Explanation of OT services, evaluation results and goal setting                                         User Key       Initials Effective Dates Name Provider Type [facility] 06/16/21 -  Rosa Chris OTR Occupational Therapist OT                        OT Recommendation and Plan                         Time Calculation:      Time Calculation- OT       Row Name 11/11/23 1335 11/11/23 1112          Time Calculation- OT    OT Start Time 1300  -MW 1030  -MW     OT Stop Time 1330  -MW 1100  -MW     OT Time Calculation (min) 30 min  -MW 30 min  -MW               User Key  (r) = Recorded By, (t) = Taken By, (c) = Cosigned By      Initials Name Provider Type    Marce Smith OT Occupational Therapist                  Therapy Charges for Today       Code Description Service Date Service Provider Modifiers Qty    62747671515  OT SELF CARE/MGMT/TRAIN EA 15 MIN 11/11/2023 Marce Leon OT GO 1    [id number]  OT THERAPEUTIC  ACT EA 15 MIN 11/11/2023 Marce Leon OT GO 1    98722925632 HC OT THER PROC EA 15 MIN 11/11/2023 Marce Leon OT GO 1    [id number] HC OT NEUROMUSC RE EDUCATION EA 15 MIN 11/11/2023 Marce Leon OT GO 1                     Marce Leon OT  11/11/2023

## 2023-11-11 NOTE — THERAPY TREATMENT NOTE
Inpatient Rehabilitation - Occupational Therapy Treatment Note    [facility]     Patient Name: Flako Coreas  : 1965  MRN: [id number]    Today's Date: 2023                 Admit Date: 2023       No diagnosis found.    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    Acute CVA (cerebrovascular accident)    HTN (hypertension)    Late effect of cerebrovascular accident (CVA)    Concussion    Fall    Head injury, closed, with concussion    Alcohol abuse       Past Medical History:   Diagnosis Date    ADHD (attention deficit hyperactivity disorder)     On going issue    Anxiety     Lepe's esophagus     Benign prostatic hyperplasia Surgery in 2021    Clotting disorder Intestinal    Depression     Diverticulitis     Diverticulosis     Duodenitis     Enteritis     Failure to thrive (child)     Family history of blood clots     GERD (gastroesophageal reflux disease)     Hyperlipidemia     Hypertension     Hypertensive emergency     Low testosterone     Microcytosis     Pancreatitis     Pneumonia     PONV (postoperative nausea and vomiting)     Seasonal affective disorder     Shoulder injury     Stroke     Unexplained weight loss        Past Surgical History:   Procedure Laterality Date    COLON SURGERY      COLONOSCOPY      COLONOSCOPY N/A 2022    Procedure: COLONOSCOPY INTO CECUM WITH HOT SNARE POLYPECTOMY;  Surgeon: Albert Gallo MD;  Location: [facility] ENDOSCOPY;  Service: Gastroenterology;  Laterality: N/A;  PRE- GI BLEED  POST- DIVERTICULOSIS, POLYP    ENDOSCOPY N/A 12/10/2022    Procedure:  ESOPHAGOGASTRODUODENOSCOPY;  Surgeon: Albert Gallo MD;  Location: [facility] ENDOSCOPY;  Service: Gastroenterology;  Laterality: N/A;  PRE- DARK STOOLS  POST- BARRETTS ESOPHAGUS    FRACTURE SURGERY  1980    for broken arm    FRACTURE SURGERY      for broken hand    INCISION AND DRAINAGE ABSCESS  2015    anal    PROSTATE SURGERY      SMALL INTESTINE SURGERY      TONSILLECTOMY               IRF OT ASSESSMENT FLOWSHEET (last 12 hours)       IRF OT Evaluation and Treatment       Row Name 11/11/23 1109          OT Time and Intention    Document Type daily treatment  -MW     Mode of Treatment occupational therapy;individual therapy  -MW     Patient Effort excellent  -MW     Symptoms Noted During/After Treatment none  -MW       Row Name 11/11/23 1109          General Information    Patient Profile Reviewed yes  -MW     Patient/Family/Caregiver Comments/Observations AM supine on arrival  -MW     Existing Precautions/Restrictions fall  -MW       Row Name 11/11/23 1109          Pain Assessment    Pretreatment Pain Rating 0/10 - no pain  -MW     Posttreatment Pain Rating 0/10 - no pain  -MW       Row Name 11/11/23 1109          Cognition/Psychosocial    Affect/Mental Status (Cognition) WFL  -MW     Orientation Status (Cognition) oriented x 4  -MW     Follows Commands (Cognition) follows one-step commands;over 90% accuracy  -MW     Personal Safety Interventions gait belt;supervised activity;fall prevention program maintained;safety round/check completed  -MW       Row Name 11/11/23 1109          Bathing    [place] Level (Bathing) bathing skills;lower body;upper body;set up;standby assist  -MW     Assistive Device (Bathing) shower chair;hand held shower spray hose  -MW     Position (Bathing) supported sitting;supported standing  -MW       Row Name 11/11/23 1109          Upper Body Dressing    [place] Level (Upper Body Dressing) upper body dressing skills;doff;don;pull over garment;set up assistance  -MW     Position  (Upper Body Dressing) supported sitting  -       Row Name 11/11/23 1109          Lower Body Dressing    [place] Level (Lower Body Dressing) don;shoes/slippers;set up;standby assist  -     Position (Lower Body Dressing) supported sitting;supported standing  -     Comment (Lower Body Dressing) pt able to obtain all clothing propelling in w/c within room with therapist present  -       Row Name 11/11/23 1109          Grooming    [place] Level (Grooming) grooming skills;oral care regimen;set up;standby assist  -     Position (Grooming) sink side;supported standing  -       Row Name 11/11/23 1109          Toileting    Comment (Toileting) declined AM  -       Row Name 11/11/23 1109          Bed Mobility    Supine-Sit [place] (Bed Mobility) standby assist  -       Row Name 11/11/23 1109          Sit-Stand Transfer    Sit-Stand [place] (Transfers) contact guard  -       Row Name 11/11/23 1109          Stand-Sit Transfer    Stand-Sit [place] (Transfers) contact guard  -       Row Name 11/11/23 1109          Shower Transfer    Type (Shower Transfer) stand pivot/stand step  -     [place] Level (Shower Transfer) set up;contact guard  -     Assistive Device (Shower Transfer) grab bar, tub/shower  -       Row Name 11/11/23 1109          Motor Skills    Functional Endurance demo x3 trialsx2 mins each, level P5 seated at arm bike  -       Row Name 11/11/23 1109          Balance    Static Standing Balance contact guard  -     Dynamic Standing Balance contact guard  -[facility] [place] Name 11/11/23 1109          Positioning and Restraints    Pre-Treatment Position in bed  -MW     Post Treatment Position wheelchair  -MW     In Wheelchair with SLP  -MW               User Key  (r) = Recorded By, (t) = Taken By, (c) = Cosigned By      Initials Name Effective Dates    Marce Smith OT 08/20/21 -                      Occupational Therapy Education       Title: PT OT SLP  Therapies (Done)       Topic: Occupational Therapy (Done)       Point: ADL training (Done)       Description:   Instruct learner(s) on proper safety adaptation and remediation techniques during self care or transfers.   Instruct in proper use of assistive devices.                  Learning Progress Summary             Patient Acceptance, E, VU by  at 11/7/2023 1649    Comment: Explanation of OT services, evaluation results and goal setting                         Point: Home exercise program (Done)       Description:   Instruct learner(s) on appropriate technique for monitoring, assisting and/or progressing therapeutic exercises/activities.                  Learning Progress Summary             Patient Acceptance, E, VU by  at 11/7/2023 1649    Comment: Explanation of OT services, evaluation results and goal setting                         Point: Precautions (Done)       Description:   Instruct learner(s) on prescribed precautions during self-care and functional transfers.                  Learning Progress Summary             Patient Acceptance, E, VU by  at 11/7/2023 1649    Comment: Explanation of OT services, evaluation results and goal setting                         Point: Body mechanics (Done)       Description:   Instruct learner(s) on proper positioning and spine alignment during self-care, functional mobility activities and/or exercises.                  Learning Progress Summary             Patient Acceptance, E, VU by  at 11/7/2023 1649    Comment: Explanation of OT services, evaluation results and goal setting                                         User Key       Initials Effective Dates Name Provider Type Discipline     06/16/21 -  Rosa Chris OTR Occupational Therapist OT                        OT Recommendation and Plan                         Time Calculation:      Time Calculation- OT       Row Name 11/11/23 1112             Time Calculation- OT    OT Start Time 1030  -MW      OT  Stop Time 1100  -MW      OT Time Calculation (min) 30 min  -MW                User Key  (r) = Recorded By, (t) = Taken By, (c) = Cosigned By      Initials Name Provider Type    Marce Smith OT Occupational Therapist                  Therapy Charges for Today       Code Description Service Date Service Provider Modifiers Qty    73569602386 HC OT SELF CARE/MGMT/TRAIN EA 15 MIN 11/11/2023 Marce Leno OT GO 1    [id number]  OT THERAPEUTIC ACT EA 15 MIN 11/11/2023 Marce Leon OT GO 1                     Marce Leon OT  11/11/2023

## 2023-11-11 NOTE — THERAPY TREATMENT NOTE
Inpatient Rehabilitation - Speech Language Pathology Treatment Note    [facility]     Patient Name: Flako Coreas  : 1965  MRN: [id number]    Today's Date: 2023                   Admit Date: 2023       Visit Dx:    No diagnosis found.    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    Acute CVA (cerebrovascular accident)    HTN (hypertension)    Late effect of cerebrovascular accident (CVA)    Concussion    Fall    Head injury, closed, with concussion    Alcohol abuse       Past Medical History:   Diagnosis Date    ADHD (attention deficit hyperactivity disorder)     On going issue    Anxiety     Lepe's esophagus     Benign prostatic hyperplasia Surgery in 2021    Clotting disorder Intestinal    Depression     Diverticulitis     Diverticulosis     Duodenitis     Enteritis     Failure to thrive (child)     Family history of blood clots     GERD (gastroesophageal reflux disease)     Hyperlipidemia     Hypertension     Hypertensive emergency     Low testosterone     Microcytosis     Pancreatitis     Pneumonia     PONV (postoperative nausea and vomiting)     Seasonal affective disorder     Shoulder injury     Stroke     Unexplained weight loss        Past Surgical History:   Procedure Laterality Date    COLON SURGERY      COLONOSCOPY      COLONOSCOPY N/A 2022    Procedure: COLONOSCOPY INTO CECUM WITH HOT SNARE POLYPECTOMY;  Surgeon: Albert Gallo MD;  Location: [facility] ENDOSCOPY;  Service: Gastroenterology;  Laterality: N/A;  PRE- GI BLEED  POST- DIVERTICULOSIS, POLYP    ENDOSCOPY N/A  12/10/2022    Procedure: ESOPHAGOGASTRODUODENOSCOPY;  Surgeon: Albert Gallo MD;  Location: [facility] ENDOSCOPY;  Service: Gastroenterology;  Laterality: N/A;  PRE- DARK STOOLS  POST- BARRETTS ESOPHAGUS    FRACTURE SURGERY  1980    for broken arm    FRACTURE SURGERY      for broken hand    INCISION AND DRAINAGE ABSCESS  2015    anal    PROSTATE SURGERY      SMALL INTESTINE SURGERY      TONSILLECTOMY         SLP Recommendation and Plan                                                               SLP EVALUATION (last 72 hours)       SLP SLC Evaluation       Row Name 11/11/23 1400 11/10/23 1330 11/10/23 1030 11/09/23 1330 11/09/23 1030       Communication Assessment/Intervention    Document Type therapy note (daily note)  -TH therapy note (daily note)  -AL therapy note (daily note)  -AL therapy note (daily note)  -AL therapy note (daily note)  -AL    Patient/Family/Caregiver Comments/Observations -- Pt participated well.  -AL Pt participated well.  -AL Pt participated well.  -AL Pt participated well. Wore eye patch on right eye due to double vision.  -AL    Patient Effort good  - -- -- -- --       Pain Scale: Numbers Pre/Post-Treatment    Pretreatment Pain Rating -- 0/10 - no pain  -AL 0/10 - no pain  -AL 0/10 - no pain  -AL 0/10 - no pain  -AL    Posttreatment Pain Rating -- -- 0/10 - no pain  -AL 0/10 - no pain  -AL --              User Key  (r) = Recorded By, (t) = Taken By, (c) = Cosigned By      Initials Name Effective Dates    Yoselyn Shah, MS CCC-SLP 06/16/21 -      Mercedes Summers SLP 08/28/23 -                        EDUCATION    The patient has been educated in the following areas:       Cognitive Impairment.             SLP GOALS       Row Name 11/11/23 1400 11/10/23 1330 11/10/23 1030       Word Retrieval Skills Goal 1 (SLP)    Improve Word Retrieval Skills By Goal 1 (SLP) low frequency;responsive naming task;confrontational naming task;supplying an antonym;supplying a synonym;completing a  "divergent task;80%;independently (over 90% accuracy)  -TH low frequency;responsive naming task;confrontational naming task;supplying an antonym;supplying a synonym;completing a divergent task;80%;independently (over 90% accuracy)  -AL --    Time Frame (Word Retrieval Goal 1, SLP) -- 1 week  -AL --    Progress/Outcomes (Word Retrieval Goal 1, SLP) good progress toward goal  -TH good progress toward goal  -AL --    Comment (Word Retrieval Goal 1, SLP) He completed responsive naming task w/ words that end in \"ist\" with 90% accuracy independently increasing to 100% with min cues.  -TH Responsive naming of words ending in \"one\": 70% with NO cues, 100% with MIN cues.  -AL --       Attention Goal 1 (SLP)    Improve Attention by Goal 1 (SLP) -- complete divided attention task;complete selective attention task;90%;independently (over 90% accuracy)  -AL --    Time Frame (Attention Goal 1, SLP) -- 1 week  -AL --    Progress/Outcomes (Attention Goal 1, SLP) -- good progress toward goal  -AL --    Comment (Attention Goal 1, SLP) -- Attention to detail for written \"If/then\" directions: 7/8 with NO cues, 8/8 with MIN cues.  -AL --       Memory Skills Goal 1 (SLP)    Improve Memory Skills Through Goal 1 (SLP) recalling unrelated word lists with an imposed delay;listen to a paragraph and answer questions;repeat list in sequential order;recall details from a word list;use memory strategies;90%;independently (over 90% accuracy)  -TH recalling unrelated word lists with an imposed delay;listen to a paragraph and answer questions;repeat list in sequential order;recall details from a word list;use memory strategies;90%;independently (over 90% accuracy)  -AL --    Time Frame (Memory Skills Goal 1, SLP) -- 1 week  -AL --    Progress/Outcomes (Memory Skills Goal 1, SLP) good progress toward goal  -TH good progress toward goal  -AL --    Comment (Memory Skills Goal 1, SLP) Mental manipulation task given 3 words; patient was asked to put them " in the correct order; with rehearsal he was able to complete this task with 90% accuracy.  -TH Working memory for 4 word ordering task; 30% with NO cues, 70% with MIN cues, 100% with MOD cues. Immediate memory for recall of voicemails using note-taking strategy: 85% with NO cues, 100% with MIN cues.  -AL --       Functional Math Skills Goal 1 (SLP)    Improve Functional Math Skills Through Goal 1 (SLP) complete functional math task;90%;independently (over 90% accuracy)  -TH -- complete functional math task;90%;independently (over 90% accuracy)  -AL    Time Frame (Functional Math Skills Goal 1, SLP) -- -- 1 week  -AL    Progress/Outcomes (Functional Math Skills Goal 1, SLP) good progress toward goal  -TH -- good progress toward goal  -AL    Comment (Functional Math Skills Goal 1, SLP) He completed functional math/menu task with 80% accuracy w/  use of calculator. 3x6 deductive reasoning puzzle completed with approximately 75% accuracy independently increasing to 100% with min cues. Cues needed for attention to detail/problem solving.  -TH -- Pt completed checkbook balancing task with MIN cue x1 for organization. Pt was 60% accurate for calculations with NO cues, 100% with MIN-MOD cues. Noted increased frustration at end of task.  -AL       Executive Functional Skills Goal 1 (SLP)    Improve Executive Function Skills Goal 1 (SLP) -- identify strategies, strengths, limitations;home management activity;perform self-evaluation;90%;independently (over 90% accuracy)  -AL --    Time Frame (Executive Function Skills Goal 1, SLP) -- 1 week  -AL --    Progress/Outcomes (Executive Function Skills Goal 1, SLP) -- good progress toward goal  -AL --    Comment (Executive Function Skills Goal 1, SLP) -- Pt independently utilized note-taking and rehearsal to assist with memory.  -AL --      Row Name 11/09/23 1330 11/09/23 1030          Word Retrieval Skills Goal 1 (SLP)    Improve Word Retrieval Skills By Goal 1 (SLP) low  "frequency;responsive naming task;confrontational naming task;supplying an antonym;supplying a synonym;completing a divergent task;80%;independently (over 90% accuracy)  -AL --     Time Frame (Word Retrieval Goal 1, SLP) 1 week  -AL --     Progress/Outcomes (Word Retrieval Goal 1, SLP) good progress toward goal  -AL --     Comment (Word Retrieval Goal 1, SLP) Responsive naming of words ending in \"ize\": 90% with NO cues, 100% with MIN cues. Pt read a short story and attempted to retell to clinician. He required MIN-MOD cues to provide relevant details and NO cues for word finding. Noted he reduced sentence length and exhibited hesitations. Question impact of reduced immediate memory vs. thought organization on this task.  -AL --        Attention Goal 1 (SLP)    Improve Attention by Goal 1 (SLP) complete divided attention task;complete selective attention task;90%;independently (over 90% accuracy)  -AL --     Time Frame (Attention Goal 1, SLP) 1 week  -AL --     Progress/Outcomes (Attention Goal 1, SLP) good progress toward goal  -AL --     Comment (Attention Goal 1, SLP) Written \"if/then\" directions: 7/8 with NO cues, 8/8 with MOD cues on initial trial. Pt benefited from cues to slow down and to talk through instructions out-loud.  -AL --        Memory Skills Goal 1 (SLP)    Improve Memory Skills Through Goal 1 (SLP) -- recalling unrelated word lists with an imposed delay;listen to a paragraph and answer questions;repeat list in sequential order;recall details from a word list;use memory strategies;90%;independently (over 90% accuracy)  -AL     Time Frame (Memory Skills Goal 1, SLP) -- 1 week  -AL     Progress/Outcomes (Memory Skills Goal 1, SLP) -- goal ongoing  -AL     Comment (Memory Skills Goal 1, SLP) -- Working memory for 4 unit list retention task: 30% with NO cues, 100% with MIN-MOD cues. Immediate memory for voicemails using note takin% with NO cues, 100% with MIN cues. Pt reported he has noticed hearing " loss prior to admission. Stated he had difficulty hearing people in restaurants. Reports he has to read lips while he is listening to clinician. SLP encouraged pt to have hearing tested at discharge. Discussed impact of reduced hearing on cognitive resource allocation.  -AL               User Key  (r) = Recorded By, (t) = Taken By, (c) = Cosigned By      Initials Name Provider Type    Yoselyn Shah, MS CCC-SLP Speech and Language Pathologist     Mercedes Summers SLP Speech and Language Pathologist                                Time Calculation:        Time Calculation- SLP       Row Name 11/11/23 1504 11/11/23 1503          Time Calculation- SLP    SLP Start Time 1330  -TH 1100  -TH     SLP Stop Time 1400  -TH 1130  -TH     SLP Time Calculation (min) 30 min  -TH 30 min  -TH               User Key  (r) = Recorded By, (t) = Taken By, (c) = Cosigned By      Initials Name Provider Type     Mercedes Summers SLP Speech and Language Pathologist                      Therapy Charges for Today       Code Description Service Date Service Provider Modifiers Qty    22385181173 HC ST DEV OF COGN SKILLS INITIAL 15 MIN 11/11/2023 Mercedes Summers SLP  1    99603264804 HC ST DEV OF COGN SKILLS EACH ADDT'L 15 MIN 11/11/2023 Mercedes Summers SLP  3                             ANA LAURA Carrington  11/11/2023

## 2023-11-11 NOTE — PLAN OF CARE
Goal Outcome Evaluation:  Plan of Care Reviewed With: patient        Progress: improving  Outcome Evaluation: Ambulates CGA 1 with walker, unsteady gait. Meds with thin liquids. continent per urinal and up to toilet for bm. Turns self in bed. Numbness and tingling L hand and L face.

## 2023-11-11 NOTE — THERAPY TREATMENT NOTE
Inpatient Rehabilitation - Physical Therapy Treatment Note       [facility]     Patient Name: Flako Coreas  : 1965  MRN: [id number]    Today's Date: 2023                    Admit Date: 2023      Visit Dx:   No diagnosis found.    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    Acute CVA (cerebrovascular accident)    HTN (hypertension)    Late effect of cerebrovascular accident (CVA)    Concussion    Fall    Head injury, closed, with concussion    Alcohol abuse       Past Medical History:   Diagnosis Date    ADHD (attention deficit hyperactivity disorder)     On going issue    Anxiety     Lepe's esophagus     Benign prostatic hyperplasia Surgery in 2021    Clotting disorder Intestinal    Depression     Diverticulitis     Diverticulosis     Duodenitis     Enteritis     Failure to thrive (child)     Family history of blood clots     GERD (gastroesophageal reflux disease)     Hyperlipidemia     Hypertension     Hypertensive emergency     Low testosterone     Microcytosis     Pancreatitis     Pneumonia     PONV (postoperative nausea and vomiting)     Seasonal affective disorder     Shoulder injury     Stroke     Unexplained weight loss        Past Surgical History:   Procedure Laterality Date    COLON SURGERY      COLONOSCOPY      COLONOSCOPY N/A 2022    Procedure: COLONOSCOPY INTO CECUM WITH HOT SNARE POLYPECTOMY;  Surgeon: Albert Gallo MD;  Location: [facility] ENDOSCOPY;  Service: Gastroenterology;  Laterality: N/A;  PRE- GI BLEED  POST- DIVERTICULOSIS, POLYP    ENDOSCOPY N/A 12/10/2022     Procedure: ESOPHAGOGASTRODUODENOSCOPY;  Surgeon: Albert Gallo MD;  Location: [facility] ENDOSCOPY;  Service: Gastroenterology;  Laterality: N/A;  PRE- DARK STOOLS  POST- BARRETTS ESOPHAGUS    FRACTURE SURGERY  1980    for broken arm    FRACTURE SURGERY      for broken hand    INCISION AND DRAINAGE ABSCESS  2015    anal    PROSTATE SURGERY      SMALL INTESTINE SURGERY      TONSILLECTOMY         PT ASSESSMENT (last 12 hours)       IRF PT Evaluation and Treatment       Row Name 11/11/23 0923          PT Time and Intention    Document Type daily treatment  -MD     Mode of Treatment physical therapy  -MD     Patient/Family/Caregiver Comments/Observations Pt supine in bed showing no signs of acute distress.  -MD       Row Name 11/11/23 0923          General Information    Existing Precautions/Restrictions fall  -MD       Row Name 11/11/23 0923          Pain Assessment    Pretreatment Pain Rating 1/10  -MD     Pain Location lower  -MD     Pain Location - back  -MD       Row Name 11/11/23 0923          Pain Scale: Word Pre/Post-Treatment    Pain Intervention(s) Repositioned;Ambulation/increased activity  -MD       Row Name 11/11/23 0923          Cognition/Psychosocial    Orientation Status (Cognition) oriented x 3  -MD     Follows Commands (Cognition) follows one-step commands;over 90% accuracy  -MD     Personal Safety Interventions gait belt;fall prevention program maintained  -MD       Row Name 11/11/23 0923          Bed Mobility    Supine-Sit [place] (Bed Mobility) standby assist  -MD       Row Name 11/11/23 0923          Bed-Chair Transfer    Bed-Chair [place] (Transfers) verbal cues;contact guard  -MD     Assistive Device (Bed-Chair Transfers) wheelchair  -MD       Row Name 11/11/23 0923          Sit-Stand Transfer    Sit-Stand Mathews (Transfers) contact guard  -MD     Assistive Device (Sit-Stand Transfers) wheelchair;walker, front-wheeled  -MD       Row Name 11/11/23 0923          Stand-Sit Transfer  "   Stand-Sit [place] (Transfers) contact guard  -MD     Assistive Device (Stand-Sit Transfers) wheelchair;walker, front-wheeled  -MD       Row Name 11/11/23 0923          Gait/Stairs (Locomotion)    [place] Level (Gait) verbal cues;minimum assist (75% patient effort)  -MD     Assistive Device (Gait) walker, front-wheeled  -MD     Distance in Feet (Gait) 80'x1 and 160'x1  -MD     Deviations/Abnormal Patterns (Gait) gait speed decreased;base of support, narrow;scissoring;ataxic  -MD     Bilateral Gait Deviations forward flexed posture  -MD     Left Sided Gait Deviations foot drop/toe drag;heel strike decreased  -MD     Gait Assessment/Intervention 2.5 lb ankle weights on B ankles while ambulating to decrease ataxia  -MD       Row Name 11/11/23 0923          Balance    Static Standing Balance contact guard  -MD     Dynamic Standing Balance contact guard  -MD     Balance Interventions standing;supported;static;dynamic;foam;core stability exercise;trunk training exercise;weight shifting activity  -MD     Comment, Balance standing feet apart on foam then standing feet together on foam 3sets for approx 10-15 sec each.  step ups w 2.5lb weights on ankles onto 4\" step w L UE support 2 sets each leg 10 reps  -MD       Row Name 11/11/23 0923          Hip (Therapeutic Exercise)    Hip Strengthening (Therapeutic Exercise) bilateral;aBduction;aDduction;marching while seated;sitting;15 repititions  2.5 lb ankle weights  -MD       Row Name 11/11/23 0923          Knee (Therapeutic Exercise)    Knee Strengthening (Therapeutic Exercise) bilateral;LAQ (long arc quad);hamstring curls;15 repititions  2.5lb ankle weights  -MD       Row Name 11/11/23 0923          Ankle (Therapeutic Exercise)    Ankle Strengthening (Therapeutic Exercise) bilateral;dorsiflexion;15 repititions  -MD       Row Name 11/11/23 0923          Positioning and Restraints    Pre-Treatment Position in bed  -MD     Post Treatment Position wheelchair  -MD     " In Wheelchair sitting;call light within reach;exit alarm on  -MD               User Key  (r) = Recorded By, (t) = Taken By, (c) = Cosigned By      Initials Name Provider Type    Triinty Dash, PT Physical Therapist                     Physical Therapy Education       Title: PT OT SLP Therapies (Done)       Topic: Physical Therapy (Done)       Point: Mobility training (Done)       Learning Progress Summary             Patient Acceptance, E,D, VU,DU by DP at 11/8/2023 1551                         Point: Home exercise program (Done)       Learning Progress Summary             Patient Acceptance, E,D, VU,DU by DP at 11/8/2023 1551                         Point: Body mechanics (Done)       Learning Progress Summary             Patient Acceptance, E,D, VU,DU by DP at 11/8/2023 1551                         Point: Precautions (Done)       Learning Progress Summary             Patient Acceptance, E, VU by MD at 11/11/2023 0927    Acceptance, E, VU by MD at 11/10/2023 1117    Acceptance, E, VU by MD at 11/9/2023 0840    Acceptance, E,D, VU,DU by DP at 11/8/2023 1551    Acceptance, E, VU by MD at 11/7/2023 1605                                         User Key       Initials Effective Dates Name Provider Type Discipline    MD 06/16/21 -  Trinity Velez, PT Physical Therapist PT    FINA 08/24/21 -  Papo Houston, CARIN Physical Therapist PT                    PT Recommendation and Plan    Frequency of Treatment (PT): 5 times per week, 60 minutes per session  Anticipated Equipment Needs at Discharge (PT Eval): front wheeled walker                  Time Calculation:      PT Charges       Row Name 11/11/23 0923             Time Calculation    Start Time 0900  -MD      Stop Time 1000  -MD      Time Calculation (min) 60 min  -MD      PT Received On 11/11/23  -MD      PT - Next Appointment 11/13/23  -MD                User Key  (r) = Recorded By, (t) = Taken By, (c) = Cosigned By      Initials Name Provider Type    Trinity Dash, PT Physical  Therapist                    Therapy Charges for Today       Code Description Service Date Service Provider Modifiers Qty    42752830582 HC PT NEUROMUSC RE EDUCATION EA 15 MIN 11/10/2023 Trinity Velez, PT GP 2    48653583507 HC GAIT TRAINING EA 15 MIN 11/10/2023 Trinity Velez, PT GP 2    89684949965 HC GAIT TRAINING EA 15 MIN 11/11/2023 Trinity Velez, PT GP 2    28547821803 HC PT THER PROC EA 15 MIN 11/11/2023 Trinity Velez, PT GP 2    95805037666 HC PT THER SUPP EA 15 MIN 11/11/2023 Trinity Velez, PT GP 4              PT G-Codes  AM-PAC 6 Clicks Score (PT): 20      Trinity Velez, PT  11/11/2023

## 2023-11-11 NOTE — PROGRESS NOTES
[facility]     Progress Note    Patient Name: Flako Coreas  : 1965  MRN: [id number]  Primary Care Physician:  Akash Rodriguez Jr., DO  Date of admission: 2023    Subjective Pt is awake and alert. Pt is pleased with his early response to therapies.   Subjective     Chief Complaint: same    History of Present Illness  Patient Reports     Review of Systems    Objective  exam unchnaged. Calves soft and NT. Resp unlabored and regular  Objective     Vitals:   Temp:  [97.6 °F (36.4 °C)-98 °F (36.7 °C)] 97.6 °F (36.4 °C)  Heart Rate:  [66-80] 75  Resp:  [18] 18  BP: (130-164)/(84-91) 130/91    Physical Exam     Result Review    Result Review:  I have personally reviewed the results from the time of this admission to 2023 12:03 EST and agree with these findings:  []  Laboratory list / accordion  []  Microbiology  []  Radiology  []  EKG/Telemetry   []  Cardiology/Vascular   []  Pathology  []  Old records  []  Other:  Most notable findings include: No new labs to review.       Assessment & Plan  Continue eval phase.   Assessment / Plan     Brief Patient Summary:  Flako Coreas is a 58 y.o. male who     Active Hospital Problems:  Active Hospital Problems    Diagnosis     **Concussion      Plan:       DVT prophylaxis:  Mechanical DVT prophylaxis orders are present.    CODE STATUS:    Level Of Support Discussed With: Patient  Code Status (Patient has no pulse and is not breathing): CPR (Attempt to Resuscitate)  Medical Interventions (Patient has pulse or is breathing): Full Support    Disposition:  I expect patient to be discharged     Akash Sarabia MD

## 2023-11-12 RX ADMIN — LISINOPRIL 40 MG: 40 TABLET ORAL at 08:37

## 2023-11-12 RX ADMIN — HYDROXYZINE HYDROCHLORIDE 50 MG: 25 TABLET ORAL at 12:45

## 2023-11-12 RX ADMIN — DOCUSATE SODIUM 50MG AND SENNOSIDES 8.6MG 2 TABLET: 8.6; 5 TABLET, FILM COATED ORAL at 08:37

## 2023-11-12 RX ADMIN — POTASSIUM CHLORIDE 10 MEQ: 750 TABLET, EXTENDED RELEASE ORAL at 08:38

## 2023-11-12 RX ADMIN — TICAGRELOR 60 MG: 60 TABLET ORAL at 08:38

## 2023-11-12 RX ADMIN — ATORVASTATIN CALCIUM 80 MG: 80 TABLET, FILM COATED ORAL at 20:33

## 2023-11-12 RX ADMIN — TICAGRELOR 60 MG: 60 TABLET ORAL at 20:39

## 2023-11-12 RX ADMIN — VILAZODONE HYDROCHLORIDE 40 MG: 40 TABLET, FILM COATED ORAL at 08:38

## 2023-11-12 RX ADMIN — FOLIC ACID 1 MG: 1 TABLET ORAL at 08:37

## 2023-11-12 RX ADMIN — CARVEDILOL 6.25 MG: 6.25 TABLET, FILM COATED ORAL at 08:37

## 2023-11-12 RX ADMIN — HYDRALAZINE HYDROCHLORIDE 100 MG: 50 TABLET, FILM COATED ORAL at 05:29

## 2023-11-12 RX ADMIN — HYDROCHLOROTHIAZIDE 12.5 MG: 12.5 TABLET ORAL at 08:37

## 2023-11-12 RX ADMIN — HYDRALAZINE HYDROCHLORIDE 100 MG: 50 TABLET, FILM COATED ORAL at 12:45

## 2023-11-12 RX ADMIN — MULTIPLE VITAMINS W/ MINERALS TAB 1 TABLET: TAB at 08:37

## 2023-11-12 RX ADMIN — AMLODIPINE BESYLATE 5 MG: 5 TABLET ORAL at 08:37

## 2023-11-12 RX ADMIN — Medication 10 MG: at 20:33

## 2023-11-12 RX ADMIN — Medication 100 MG: at 08:38

## 2023-11-12 RX ADMIN — CARVEDILOL 6.25 MG: 6.25 TABLET, FILM COATED ORAL at 17:13

## 2023-11-12 RX ADMIN — PANTOPRAZOLE SODIUM 40 MG: 40 TABLET, DELAYED RELEASE ORAL at 05:30

## 2023-11-12 RX ADMIN — ASPIRIN 81 MG: 81 TABLET, COATED ORAL at 08:37

## 2023-11-12 NOTE — PROGRESS NOTES
[facility]     Progress Note    Patient Name: Flako Coreas  : 1965  MRN: [id number]  Primary Care Physician:  Akash Rodriguez Jr., DO  Date of admission: 2023    Subjective Pt is awake and alert. Pt is pleased with his progress so far.   Subjective     Chief Complaint: same    History of Present Illness  Patient Reports     Review of Systems    Objective  exam unchanged calves soft and NT. Resp unlabored and regular  Objective     Vitals:   Temp:  [97.7 °F (36.5 °C)-99.1 °F (37.3 °C)] 98.4 °F (36.9 °C)  Heart Rate:  [66-89] 89  Resp:  [18] 18  BP: (112-135)/(74-80) 135/80    Physical Exam     Result Review    Result Review:  I have personally reviewed the results from the time of this admission to 2023 11:13 EST and agree with these findings:  []  Laboratory list / accordion  []  Microbiology  []  Radiology  []  EKG/Telemetry   []  Cardiology/Vascular   []  Pathology  []  Old records  []  Other:  Most notable findings include:       Assessment & Plan Continue eval phase.   Assessment / Plan     Brief Patient Summary:  Flako Coreas is a 58 y.o. male who     Active Hospital Problems:  Active Hospital Problems    Diagnosis     **Concussion      Plan:       DVT prophylaxis:  Mechanical DVT prophylaxis orders are present.    CODE STATUS:    Level Of Support Discussed With: Patient  Code Status (Patient has no pulse and is not breathing): CPR (Attempt to Resuscitate)  Medical Interventions (Patient has pulse or is breathing): Full Support    Disposition:  I expect patient to be discharged     Akash Sarabia MD              negative...

## 2023-11-12 NOTE — PLAN OF CARE
Problem: Rehabilitation (IRF) Plan of Care  Goal: Plan of Care Review  11/12/2023 1531 by Ashley Llanos RN  Flowsheets (Taken 11/12/2023 1531)  Outcome Evaluation: VSS,AOX4, assist X1 w/ walker and gait belt. PRN Atarax given. room air. BM on 11/11/2023. urinal within reach, bed alarm on, Call light within reach  11/12/2023 1255 by Ashley Llanos RN  Outcome: Ongoing, Progressing  Goal: Patient-Specific Goal (Individualized)  Outcome: Ongoing, Progressing  Goal: Absence of New-Onset Illness or Injury  Outcome: Ongoing, Progressing  Intervention: Prevent Fall and Fall Injury  Recent Flowsheet Documentation  Taken 11/12/2023 1200 by Ashley Llanos RN  Safety Promotion/Fall Prevention:   fall prevention program maintained   nonskid shoes/slippers when out of bed   safety round/check completed   clutter free environment maintained  Taken 11/12/2023 1000 by Ashley Llanos RN  Safety Promotion/Fall Prevention:   safety round/check completed   fall prevention program maintained   nonskid shoes/slippers when out of bed  Taken 11/12/2023 0800 by Ashley Llanos RN  Safety Promotion/Fall Prevention:   nonskid shoes/slippers when out of bed   safety round/check completed   fall prevention program maintained   clutter free environment maintained  Intervention: Prevent VTE (Venous Thromboembolism)  Recent Flowsheet Documentation  Taken 11/12/2023 0800 by Ashley Llanos RN  VTE Prevention/Management: (ASA)   bilateral   patient refused intervention   other (see comments)  Goal: Optimal Comfort and Wellbeing  Outcome: Ongoing, Progressing  Goal: Home and Community Transition Plan Established  Outcome: Ongoing, Progressing   Goal Outcome Evaluation:              Outcome Evaluation: VSS,AOX4, assist X1 w/ walker and gait belt. PRN Atarax given. room air. BM on 11/11/2023. urinal within reach, bed alarm on, Call light within reach

## 2023-11-12 NOTE — PLAN OF CARE
Goal Outcome Evaluation:  Plan of Care Reviewed With: patient        Progress: improving  Outcome Evaluation: No c/o pain. Meds with thin liquids. Atarax given at HS per pt request. States Melatonin helps him sleep well. Continent per urinal.

## 2023-11-12 NOTE — PROGRESS NOTES
Inpatient Rehabilitation Plan of Care Note    Plan of Care  Care Plan Reviewed - No updates at this time.    Psychosocial    [RN] Behavior(Active)  Current Status(11/10/2023): Anxiety related to hx of strokes and fall  Weekly Goal(11/14/2023): Allow opportunity to express concers regarding current  status.  Discharge Goal: Demonstrate healthy oping strategies.    Performed Intervention(s)  Medication  verbalize needs and concerns  suicidal precautions placed today      Safety    Performed Intervention(s)  Items within reach  Safety rounds  Bed alarm/chair alarm      Sphincter Control    Performed Intervention(s)  Therapeutic exercises/modalities  Elimination schedule  Encourage appropriate diet    Signed by: Harini Espinal, RN

## 2023-11-13 VITALS
SYSTOLIC BLOOD PRESSURE: 99 MMHG | OXYGEN SATURATION: 95 % | RESPIRATION RATE: 18 BRPM | DIASTOLIC BLOOD PRESSURE: 60 MMHG | BODY MASS INDEX: 29.84 KG/M2 | HEART RATE: 54 BPM | TEMPERATURE: 98 F | WEIGHT: 232.5 LBS | HEIGHT: 74 IN

## 2023-11-13 LAB
ANION GAP SERPL CALCULATED.3IONS-SCNC: 13 MMOL/L (ref 5–15)
BASOPHILS # BLD AUTO: 0.05 10*3/MM3 (ref 0–0.2)
BASOPHILS NFR BLD AUTO: 0.6 % (ref 0–1.5)
BUN SERPL-MCNC: 12 MG/DL (ref 6–20)
BUN/CREAT SERPL: 10.8 (ref 7–25)
CALCIUM SPEC-SCNC: 9.6 MG/DL (ref 8.6–10.5)
CHLORIDE SERPL-SCNC: 105 MMOL/L (ref 98–107)
CO2 SERPL-SCNC: 26 MMOL/L (ref 22–29)
CREAT SERPL-MCNC: 1.11 MG/DL (ref 0.76–1.27)
DEPRECATED RDW RBC AUTO: 41.1 FL (ref 37–54)
EGFRCR SERPLBLD CKD-EPI 2021: 77.9 ML/MIN/1.73
EOSINOPHIL # BLD AUTO: 0.43 10*3/MM3 (ref 0–0.4)
EOSINOPHIL NFR BLD AUTO: 5.4 % (ref 0.3–6.2)
ERYTHROCYTE [DISTWIDTH] IN BLOOD BY AUTOMATED COUNT: 13.8 % (ref 12.3–15.4)
GLUCOSE SERPL-MCNC: 127 MG/DL (ref 65–99)
HCT VFR BLD AUTO: 41.7 % (ref 37.5–51)
HGB BLD-MCNC: 13.9 G/DL (ref 13–17.7)
IMM GRANULOCYTES # BLD AUTO: 0.02 10*3/MM3 (ref 0–0.05)
IMM GRANULOCYTES NFR BLD AUTO: 0.3 % (ref 0–0.5)
LYMPHOCYTES # BLD AUTO: 1.89 10*3/MM3 (ref 0.7–3.1)
LYMPHOCYTES NFR BLD AUTO: 23.9 % (ref 19.6–45.3)
MCH RBC QN AUTO: 27.5 PG (ref 26.6–33)
MCHC RBC AUTO-ENTMCNC: 33.3 G/DL (ref 31.5–35.7)
MCV RBC AUTO: 82.4 FL (ref 79–97)
MONOCYTES # BLD AUTO: 0.39 10*3/MM3 (ref 0.1–0.9)
MONOCYTES NFR BLD AUTO: 4.9 % (ref 5–12)
NEUTROPHILS NFR BLD AUTO: 5.12 10*3/MM3 (ref 1.7–7)
NEUTROPHILS NFR BLD AUTO: 64.9 % (ref 42.7–76)
NRBC BLD AUTO-RTO: 0 /100 WBC (ref 0–0.2)
PLATELET # BLD AUTO: 356 10*3/MM3 (ref 140–450)
PMV BLD AUTO: 10.1 FL (ref 6–12)
POTASSIUM SERPL-SCNC: 3.4 MMOL/L (ref 3.5–5.2)
RBC # BLD AUTO: 5.06 10*6/MM3 (ref 4.14–5.8)
SODIUM SERPL-SCNC: 144 MMOL/L (ref 136–145)
WBC NRBC COR # BLD: 7.9 10*3/MM3 (ref 3.4–10.8)

## 2023-11-13 PROCEDURE — 80048 BASIC METABOLIC PNL TOTAL CA: CPT | Performed by: PHYSICAL MEDICINE & REHABILITATION

## 2023-11-13 PROCEDURE — 97535 SELF CARE MNGMENT TRAINING: CPT

## 2023-11-13 PROCEDURE — 97116 GAIT TRAINING THERAPY: CPT

## 2023-11-13 PROCEDURE — 85025 COMPLETE CBC W/AUTO DIFF WBC: CPT | Performed by: PHYSICAL MEDICINE & REHABILITATION

## 2023-11-13 PROCEDURE — 97110 THERAPEUTIC EXERCISES: CPT

## 2023-11-13 PROCEDURE — 97130 THER IVNTJ EA ADDL 15 MIN: CPT

## 2023-11-13 PROCEDURE — 97112 NEUROMUSCULAR REEDUCATION: CPT

## 2023-11-13 PROCEDURE — 97129 THER IVNTJ 1ST 15 MIN: CPT

## 2023-11-13 RX ADMIN — AMLODIPINE BESYLATE 5 MG: 5 TABLET ORAL at 07:37

## 2023-11-13 RX ADMIN — ATORVASTATIN CALCIUM 80 MG: 80 TABLET, FILM COATED ORAL at 21:02

## 2023-11-13 RX ADMIN — TICAGRELOR 60 MG: 60 TABLET ORAL at 07:37

## 2023-11-13 RX ADMIN — CARVEDILOL 6.25 MG: 6.25 TABLET, FILM COATED ORAL at 17:48

## 2023-11-13 RX ADMIN — Medication 10 MG: at 21:02

## 2023-11-13 RX ADMIN — ASPIRIN 81 MG: 81 TABLET, COATED ORAL at 07:36

## 2023-11-13 RX ADMIN — LISINOPRIL 40 MG: 40 TABLET ORAL at 07:36

## 2023-11-13 RX ADMIN — HYDRALAZINE HYDROCHLORIDE 100 MG: 50 TABLET, FILM COATED ORAL at 13:20

## 2023-11-13 RX ADMIN — HYDROXYZINE HYDROCHLORIDE 50 MG: 25 TABLET ORAL at 11:28

## 2023-11-13 RX ADMIN — DOCUSATE SODIUM 50MG AND SENNOSIDES 8.6MG 2 TABLET: 8.6; 5 TABLET, FILM COATED ORAL at 07:38

## 2023-11-13 RX ADMIN — PANTOPRAZOLE SODIUM 40 MG: 40 TABLET, DELAYED RELEASE ORAL at 05:27

## 2023-11-13 RX ADMIN — CARVEDILOL 6.25 MG: 6.25 TABLET, FILM COATED ORAL at 07:36

## 2023-11-13 RX ADMIN — HYDROCHLOROTHIAZIDE 12.5 MG: 12.5 TABLET ORAL at 07:37

## 2023-11-13 RX ADMIN — HYDRALAZINE HYDROCHLORIDE 100 MG: 50 TABLET, FILM COATED ORAL at 05:25

## 2023-11-13 RX ADMIN — POTASSIUM CHLORIDE 10 MEQ: 750 TABLET, EXTENDED RELEASE ORAL at 07:39

## 2023-11-13 RX ADMIN — FOLIC ACID 1 MG: 1 TABLET ORAL at 07:37

## 2023-11-13 RX ADMIN — MULTIPLE VITAMINS W/ MINERALS TAB 1 TABLET: TAB at 07:37

## 2023-11-13 RX ADMIN — VILAZODONE HYDROCHLORIDE 40 MG: 40 TABLET, FILM COATED ORAL at 07:36

## 2023-11-13 RX ADMIN — TICAGRELOR 60 MG: 60 TABLET ORAL at 21:02

## 2023-11-13 RX ADMIN — Medication 100 MG: at 07:37

## 2023-11-13 RX ADMIN — HYDROXYZINE HYDROCHLORIDE 50 MG: 25 TABLET ORAL at 21:04

## 2023-11-13 NOTE — PLAN OF CARE
Goal Outcome Evaluation:  Plan of Care Reviewed With: patient           Outcome Evaluation: Slept well following Melatonin 10mg.Still has tingling and numbnessof Lt hand ,face ,cheek and tongue. Continent B&B.Remains calm &cooperative.Assist x1.A&O X4

## 2023-11-13 NOTE — PROGRESS NOTES
Inpatient Rehabilitation Plan of Care Note    Plan of Care  Care Plan Reviewed - Updates as Follows    Safety    [RN] Potential for Injury(Active)  Current Status(11/13/2023): Uses call light appropriately  Weekly Goal(11/20/2023): Cues to use call light  Discharge Goal: Items within reach    Performed Intervention(s)  Items within reach  Safety rounds  Bed alarm/chair alarm      Psychosocial    [RN] Behavior(Active)  Current Status(11/13/2023): Anxiety related to hx of strokes and fall  Weekly Goal(11/16/2023): Allow opportunity to express concers regarding current  status.  Discharge Goal: Demonstrate healthy oping strategies.    Performed Intervention(s)  Medication  verbalize needs and concerns      Sphincter Control    [RN] Bowel Management(Active)  Current Status(11/13/2023): continent 100%  Weekly Goal(11/24/2023): continet 100%  Discharge Goal: continent 100%    [RN] Bladder Management(Active)  Current Status(11/13/2023): continent 100%  Weekly Goal(11/23/2023): Continent 100%  Discharge Goal: Continent 100%    Performed Intervention(s)  Therapeutic exercises/modalities  Elimination schedule  Encourage appropriate diet    Signed by: Sherri Clark, RN

## 2023-11-13 NOTE — THERAPY TREATMENT NOTE
From: Terry Vee  To: Cadence Jovel DO  Sent: 7/28/2021 9:24 AM EDT  Subject: Non-Urgent Medical Question    Hi dr Jovel! We just bought a house, now [place]'m back in [place]. [place] to be exact. I haven't found a dr yet down here. I have 2 left of the anxiety and 2 left of the depression medication. Could send me a refill of both please until I can find one here. Here's the location to send it too.     [facility]   [address], [place] [address]  Number : [phone]   Inpatient Rehabilitation - Occupational Therapy Treatment Note    [facility]     Patient Name: Flako Coreas  : 1965  MRN: [id number]    Today's Date: 2023                 Admit Date: 2023       No diagnosis found.    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    Acute CVA (cerebrovascular accident)    HTN (hypertension)    Late effect of cerebrovascular accident (CVA)    Concussion    Fall    Head injury, closed, with concussion    Alcohol abuse       Past Medical History:   Diagnosis Date    ADHD (attention deficit hyperactivity disorder)     On going issue    Anxiety     Lepe's esophagus     Benign prostatic hyperplasia Surgery in 2021    Clotting disorder Intestinal    Depression     Diverticulitis     Diverticulosis     Duodenitis     Enteritis     Failure to thrive (child)     Family history of blood clots     GERD (gastroesophageal reflux disease)     Hyperlipidemia     Hypertension     Hypertensive emergency     Low testosterone     Microcytosis     Pancreatitis     Pneumonia     PONV (postoperative nausea and vomiting)     Seasonal affective disorder     Shoulder injury     Stroke     Unexplained weight loss        Past Surgical History:   Procedure Laterality Date    COLON SURGERY      COLONOSCOPY      COLONOSCOPY N/A 2022    Procedure: COLONOSCOPY INTO CECUM WITH HOT SNARE POLYPECTOMY;  Surgeon: Albert Gallo MD;  Location: [facility] ENDOSCOPY;  Service: Gastroenterology;  Laterality: N/A;  PRE- GI BLEED  POST- DIVERTICULOSIS, POLYP    ENDOSCOPY N/A 12/10/2022    Procedure:  ESOPHAGOGASTRODUODENOSCOPY;  Surgeon: Albert Gallo MD;  Location: [facility] ENDOSCOPY;  Service: Gastroenterology;  Laterality: N/A;  PRE- DARK STOOLS  POST- BARRETTS ESOPHAGUS    FRACTURE SURGERY  1980    for broken arm    FRACTURE SURGERY      for broken hand    INCISION AND DRAINAGE ABSCESS  2015    anal    PROSTATE SURGERY      SMALL INTESTINE SURGERY      TONSILLECTOMY               IRF OT ASSESSMENT FLOWSHEET (last 12 hours)       IRF OT Evaluation and Treatment       Row Name 11/13/23 1202          OT Time and Intention    Document Type daily treatment  -CC     Mode of Treatment occupational therapy  -CC     Patient Effort good  -CC     Symptoms Noted During/After Treatment none  -CC       Row Name 11/13/23 1202          Pain Assessment    Pretreatment Pain Rating 0/10 - no pain  -CC     Posttreatment Pain Rating 0/10 - no pain  -CC       Row Name 11/13/23 1202          Cognition/Psychosocial    Affect/Mental Status (Cognition) WFL  -     Orientation Status (Cognition) oriented x 4  -CC     Follows Commands (Cognition) follows one-step commands;over 90% accuracy  -CC     Personal Safety Interventions fall prevention program maintained;gait belt;nonskid shoes/slippers when out of bed  -CC       Row Name 11/13/23 1202          Vision Assessment/Intervention    Visual Impairment/Limitations diplopia  wearing eye patch  -CC       Row Name 11/13/23 1202          Bathing    [place] Level (Bathing) bathing skills;lower body;upper body;set up;standby assist;contact guard assist  -     Assistive Device (Bathing) hand held shower spray hose;shower chair;grab bar/tub rail  -     Position (Bathing) supported sitting;supported standing  -     Set-up Assistance (Bathing) adaptive equipment set-up;adjust water temperature;obtain supplies  -CC       Row Name 11/13/23 1202          Upper Body Dressing    [place] Level (Upper Body Dressing) upper body dressing skills;doff;don;pull over garment;set up  assistance  -     Position (Upper Body Dressing) supported sitting  -     Set-up Assistance (Upper Body Dressing) obtain clothing  -       Row Name 11/13/23 1202          Lower Body Dressing    [place] Level (Lower Body Dressing) don;shoes/slippers;set up;pants/bottoms;doff;underwear;standby assist;contact guard assist;supervision  -     Position (Lower Body Dressing) supported sitting;supported standing  -     Set-up Assistance (Lower Body Dressing) obtain clothing  -       Row Name 11/13/23 1202          Grooming    [place] Level (Grooming) grooming skills;oral care regimen;set up;standby assist  -     Position (Grooming) sink side;supported standing  -     Set-up Assistance (Grooming) obtain supplies  -       Row Name 11/13/23 1202          Toileting    [place] Level (Toileting) toileting skills;adjust/manage clothing;contact guard assist;supervision  -     Position (Toileting) supported standing;unsupported sitting  -       Row Name 11/13/23 1202          Bed Mobility    Rolling Right [place] (Bed Mobility) supervision  -     Supine-Sit [place] (Bed Mobility) standby assist  -       Row Name 11/13/23 1202          Functional Mobility    Functional Mobility- Ind. Level set up required;contact guard assist  -     Functional Mobility- Device walker, front-wheeled  -     Functional Mobility-Distance (Feet) --  to BR  -       Row Name 11/13/23 1202          Sit-Stand Transfer    Sit-Stand [place] (Transfers) contact guard  -     Assistive Device (Sit-Stand Transfers) wheelchair;walker, front-wheeled  -       Row Name 11/13/23 1202          Stand-Sit Transfer    Stand-Sit [place] (Transfers) contact guard  -     Assistive Device (Stand-Sit Transfers) wheelchair;walker, front-wheeled  -       Row Name 11/13/23 1202          Toilet Transfer    Type (Toilet Transfer) stand pivot/stand step  -     [place] Level (Toilet Transfer) contact  guard  -CC     Assistive Device (Toilet Transfer) grab bars/safety frame  -       Row Name 11/13/23 1202          Shower Transfer    Type (Shower Transfer) stand pivot/stand step  -     [place] Level (Shower Transfer) set up;contact guard  -CC     Assistive Device (Shower Transfer) grab bar, tub/shower  -CC       Row Name 11/13/23 1202          Balance    Static Sitting Balance independent  -     Dynamic Sitting Balance supervision  -     Static Standing Balance standby assist;contact guard  -CC     Dynamic Standing Balance contact guard  -CC       Row Name 11/13/23 1202          Positioning and Restraints    Pre-Treatment Position in bed  -     Post Treatment Position wheelchair  -     In Wheelchair notified nsg;sitting;call light within reach;encouraged to call for assist;exit alarm on  -               User Key  (r) = Recorded By, (t) = Taken By, (c) = Cosigned By      Initials Name Effective Dates    CC Rosa Chris, OTR 06/16/21 -                      Occupational Therapy Education       Title: PT OT SLP Therapies (Done)       Topic: Occupational Therapy (Done)       Point: ADL training (Done)       Description:   Instruct learner(s) on proper safety adaptation and remediation techniques during self care or transfers.   Instruct in proper use of assistive devices.                  Learning Progress Summary             Patient Acceptance, E, VU by  at 11/7/2023 4424    Comment: Explanation of OT services, evaluation results and goal setting                         Point: Home exercise program (Done)       Description:   Instruct learner(s) on appropriate technique for monitoring, assisting and/or progressing therapeutic exercises/activities.                  Learning Progress Summary             Patient Acceptance, E, VU by  at 11/7/2023 4466    Comment: Explanation of OT services, evaluation results and goal setting                         Point: Precautions (Done)       Description:    Instruct learner(s) on prescribed precautions during self-care and functional transfers.                  Learning Progress Summary             Patient Acceptance, E, VU by  at 11/7/2023 1649    Comment: Explanation of OT services, evaluation results and goal setting                         Point: Body mechanics (Done)       Description:   Instruct learner(s) on proper positioning and spine alignment during self-care, functional mobility activities and/or exercises.                  Learning Progress Summary             Patient Acceptance, E, VU by  at 11/7/2023 1649    Comment: Explanation of OT services, evaluation results and goal setting                                         User Key       Initials Effective Dates Name Provider Type Discipline     06/16/21 -  Rosa Chris OTR Occupational Therapist OT                        OT Recommendation and Plan    Planned Therapy Interventions (OT): activity tolerance training, adaptive equipment training, BADL retraining, functional balance retraining, neuromuscular control/coordination retraining, strengthening exercise, transfer/mobility retraining, patient/caregiver education/training                    Time Calculation:      Time Calculation- OT       Row Name 11/13/23 0800             Time Calculation- OT    OT Start Time 0800  -      OT Stop Time 0830  -      OT Time Calculation (min) 30 min  -      OT Received On 11/06/23  -      OT Goal Re-Cert Due Date 11/14/23  -                User Key  (r) = Recorded By, (t) = Taken By, (c) = Cosigned By      Initials Name Provider Type     oRsa Chris OTR Occupational Therapist                  Therapy Charges for Today       Code Description Service Date Service Provider Modifiers Qty    25085849502 HC OT SELF CARE/MGMT/TRAIN EA 15 MIN 11/13/2023 Rosa Chris OTR GO 2                     BEATA Mejia  11/13/2023

## 2023-11-13 NOTE — THERAPY TREATMENT NOTE
Inpatient Rehabilitation - Occupational Therapy Treatment Note    [facility]     Patient Name: Flako Coreas  : 1965  MRN: [id number]    Today's Date: 2023                 Admit Date: 2023       No diagnosis found.    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    Acute CVA (cerebrovascular accident)    HTN (hypertension)    Late effect of cerebrovascular accident (CVA)    Concussion    Fall    Head injury, closed, with concussion    Alcohol abuse       Past Medical History:   Diagnosis Date    ADHD (attention deficit hyperactivity disorder)     On going issue    Anxiety     Lepe's esophagus     Benign prostatic hyperplasia Surgery in 2021    Clotting disorder Intestinal    Depression     Diverticulitis     Diverticulosis     Duodenitis     Enteritis     Failure to thrive (child)     Family history of blood clots     GERD (gastroesophageal reflux disease)     Hyperlipidemia     Hypertension     Hypertensive emergency     Low testosterone     Microcytosis     Pancreatitis     Pneumonia     PONV (postoperative nausea and vomiting)     Seasonal affective disorder     Shoulder injury     Stroke     Unexplained weight loss        Past Surgical History:   Procedure Laterality Date    COLON SURGERY      COLONOSCOPY      COLONOSCOPY N/A 2022    Procedure: COLONOSCOPY INTO CECUM WITH HOT SNARE POLYPECTOMY;  Surgeon: Albert Gallo MD;  Location: [facility] ENDOSCOPY;  Service: Gastroenterology;  Laterality: N/A;  PRE- GI BLEED  POST- DIVERTICULOSIS, POLYP    ENDOSCOPY N/A 12/10/2022    Procedure:  ESOPHAGOGASTRODUODENOSCOPY;  Surgeon: Albert Gallo MD;  Location: [facility] ENDOSCOPY;  Service: Gastroenterology;  Laterality: N/A;  PRE- DARK STOOLS  POST- BARRETTS ESOPHAGUS    FRACTURE SURGERY  1980    for broken arm    FRACTURE SURGERY      for broken hand    INCISION AND DRAINAGE ABSCESS  2015    anal    PROSTATE SURGERY      SMALL INTESTINE SURGERY      TONSILLECTOMY               IRF OT ASSESSMENT FLOWSHEET (last 12 hours)       IRF OT Evaluation and Treatment       Row Name 11/13/23 1626 11/13/23 1202       OT Time and Intention    Document Type daily treatment  -CC daily treatment  -CC    Mode of Treatment occupational therapy  -CC occupational therapy  -CC    Patient Effort good  -CC good  -CC    Symptoms Noted During/After Treatment other (see comments)  increased anxiety  -CC none  -CC      Row Name 11/13/23 1626 11/13/23 1202       Pain Assessment    Pretreatment Pain Rating 0/10 - no pain  -CC 0/10 - no pain  -CC    Posttreatment Pain Rating 0/10 - no pain  -CC 0/10 - no pain  -CC      Row Name 11/13/23 1626 11/13/23 1202       Cognition/Psychosocial    Affect/Mental Status (Cognition) WFL  -CC WFL  -CC    Orientation Status (Cognition) oriented x 4  -CC oriented x 4  -CC    Follows Commands (Cognition) follows one-step commands;over 90% accuracy  -CC follows one-step commands;over 90% accuracy  -CC    Personal Safety Interventions fall prevention program maintained;gait belt;nonskid shoes/slippers when out of bed  -CC fall prevention program maintained;gait belt;nonskid shoes/slippers when out of bed  -CC    Additional Documentation --  completed redaing comprehension task w 50% correct. Pt used paper guide for reading and wore eye patch.  -CC --      Row Name 11/13/23 1202          Vision Assessment/Intervention    Visual Impairment/Limitations diplopia  wearing eye patch  -CC       Row Name 11/13/23 1202          Bathing    [place] Level (Bathing) bathing skills;lower body;upper body;set  up;standby assist;contact guard assist  -     Assistive Device (Bathing) hand held shower spray hose;shower chair;grab bar/tub rail  -     Position (Bathing) supported sitting;supported standing  -     Set-up Assistance (Bathing) adaptive equipment set-up;adjust water temperature;obtain supplies  -       Row Name 11/13/23 1202          Upper Body Dressing    [place] Level (Upper Body Dressing) upper body dressing skills;doff;don;pull over garment;set up assistance  -     Position (Upper Body Dressing) supported sitting  -     Set-up Assistance (Upper Body Dressing) obtain clothing  -       Row Name 11/13/23 1202          Lower Body Dressing    [place] Level (Lower Body Dressing) don;shoes/slippers;set up;pants/bottoms;doff;underwear;standby assist;contact guard assist;supervision  -     Position (Lower Body Dressing) supported sitting;supported standing  -     Set-up Assistance (Lower Body Dressing) obtain clothing  -       Row Name 11/13/23 1202          Grooming    [place] Level (Grooming) grooming skills;oral care regimen;set up;standby assist  -     Position (Grooming) sink side;supported standing  -     Set-up Assistance (Grooming) obtain supplies  -       Row Name 11/13/23 1202          Toileting    [place] Level (Toileting) toileting skills;adjust/manage clothing;contact guard assist;supervision  -     Position (Toileting) supported standing;unsupported sitting  -       Row Name 11/13/23 1626 11/13/23 1202       Bed Mobility    Rolling Right [place] (Bed Mobility) -- supervision  -    Supine-Sit [place] (Bed Mobility) -- standby assist  -    Sit-Supine [place] (Bed Mobility) standby assist  -CC --      Row Name 11/13/23 1202          Functional Mobility    Functional Mobility- Ind. Level set up required;contact guard assist  -CC     Functional Mobility- Device walker, front-wheeled  -     Functional Mobility-Distance (Feet) --  to BR  -        Row Name 11/13/23 1626          Bed-Chair Transfer    Bed-Chair [place] (Transfers) verbal cues;contact guard;minimum assist (75% patient effort)  -     Assistive Device (Bed-Chair Transfers) wheelchair  -CC       Row Name 11/13/23 1626          Chair-Bed Transfer    Chair-Bed [place] (Transfers) verbal cues;contact guard;minimum assist (75% patient effort)  -CC     Assistive Device (Chair-Bed Transfers) wheelchair  -       Row Name 11/13/23 1202          Sit-Stand Transfer    Sit-Stand [place] (Transfers) contact guard  -CC     Assistive Device (Sit-Stand Transfers) wheelchair;walker, front-wheeled  -CC       Row Name 11/13/23 1202          Stand-Sit Transfer    Stand-Sit [place] (Transfers) contact guard  -     Assistive Device (Stand-Sit Transfers) wheelchair;walker, front-wheeled  -CC       Row Name 11/13/23 1202          Toilet Transfer    Type (Toilet Transfer) stand pivot/stand step  -     [place] Level (Toilet Transfer) contact guard  -     Assistive Device (Toilet Transfer) grab bars/safety frame  -       Row Name 11/13/23 1202          Shower Transfer    Type (Shower Transfer) stand pivot/stand step  -     [place] Level (Shower Transfer) set up;contact guard  -     Assistive Device (Shower Transfer) grab bar, tub/shower  -       Row Name 11/13/23 1626 11/13/23 1202       Balance    Static Sitting Balance -- independent  -    Dynamic Sitting Balance -- supervision  -    Static Standing Balance contact guard  -CC standby assist;contact guard  -CC    Dynamic Standing Balance moderate assist;contact guard  -CC contact guard  -CC    Balance Interventions --  pt attempted standing for arm bilke but unable to  maintain balance. Pt stood at counter w uni;ateral support and engaged in reaching across midline activity. Pt tensed up and over corrected causing loss of balance. Pt legs shook w fatigue.  -CC --      Row Name 11/13/23 1626 11/13/23 1202        Positioning and Restraints    Pre-Treatment Position sitting in chair/recliner  -CC in bed  -    Post Treatment Position bed  -CC wheelchair  -CC    In Bed notified nsg;call light within reach;encouraged to call for assist;supine;exit alarm on  -CC --    In Wheelchair -- notified nsg;sitting;call light within reach;encouraged to call for assist;exit alarm on  -CC              User Key  (r) = Recorded By, (t) = Taken By, (c) = Cosigned By      Initials Name Effective Dates    CC Rosa Chris, BEATA 06/16/21 -                      Occupational Therapy Education       Title: PT OT SLP Therapies (Done)       Topic: Occupational Therapy (Done)       Point: ADL training (Done)       Description:   Instruct learner(s) on proper safety adaptation and remediation techniques during self care or transfers.   Instruct in proper use of assistive devices.                  Learning Progress Summary             Patient Acceptance, E, VU by  at 11/7/2023 1649    Comment: Explanation of OT services, evaluation results and goal setting                         Point: Home exercise program (Done)       Description:   Instruct learner(s) on appropriate technique for monitoring, assisting and/or progressing therapeutic exercises/activities.                  Learning Progress Summary             Patient Acceptance, E, VU by CC at 11/7/2023 1649    Comment: Explanation of OT services, evaluation results and goal setting                         Point: Precautions (Done)       Description:   Instruct learner(s) on prescribed precautions during self-care and functional transfers.                  Learning Progress Summary             Patient Acceptance, E, VU by CC at 11/7/2023 1649    Comment: Explanation of OT services, evaluation results and goal setting                         Point: Body mechanics (Done)       Description:   Instruct learner(s) on proper positioning and spine alignment during self-care, functional mobility  activities and/or exercises.                  Learning Progress Summary             Patient Acceptance, E, VU by  at 11/7/2023 8850    Comment: Explanation of OT services, evaluation results and goal setting                                         User Key       Initials Effective Dates Name Provider Type Discipline     06/16/21 -  Rosa Chris OTR Occupational Therapist OT                        OT Recommendation and Plan    Planned Therapy Interventions (OT): activity tolerance training, adaptive equipment training, BADL retraining, functional balance retraining, neuromuscular control/coordination retraining, strengthening exercise, transfer/mobility retraining, patient/caregiver education/training                    Time Calculation:      Time Calculation- OT       Row Name 11/13/23 1230 11/13/23 0800          Time Calculation- OT    OT Start Time 1230  - 0800  -     OT Stop Time 1300  - 0830  -     OT Time Calculation (min) 30 min  - 30 min  -     OT Received On -- 11/06/23  -     OT Goal Re-Cert Due Date -- 11/14/23  -               User Key  (r) = Recorded By, (t) = Taken By, (c) = Cosigned By      Initials Name Provider Type     Rosa Chris OTR Occupational Therapist                  Therapy Charges for Today       Code Description Service Date Service Provider Modifiers Qty    52200561003  OT SELF CARE/MGMT/TRAIN EA 15 MIN 11/13/2023 Rosa Chris OTR GO 2    [id number]  OT NEUROMUSC RE EDUCATION EA 15 MIN 11/13/2023 Rosa Chris OTR GO 2                     BEATA Mejia  11/13/2023

## 2023-11-13 NOTE — PLAN OF CARE
Goal Outcome Evaluation:  Plan of Care Reviewed With: patient           Outcome Evaluation: Flako is alert and oriented x4, but anxious at times-PRN Atarax once this afternoon.He is assist x1 with unsteady gait, takes medication with water, and continent of b/b, He has double vision in both eyes- eye patch ordered- see orders. N/T to left face, tongue, and hand, no unsafe behavior, and no complaints of pain. He tolerated all therapies, no unsafe behavior, and VS stable.

## 2023-11-13 NOTE — THERAPY TREATMENT NOTE
Inpatient Rehabilitation - Speech Language Pathology Treatment Note    [facility]     Patient Name: Flako Coreas  : 1965  MRN: [id number]    Today's Date: 2023                   Admit Date: 2023       Visit Dx:    No diagnosis found.    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    Acute CVA (cerebrovascular accident)    HTN (hypertension)    Late effect of cerebrovascular accident (CVA)    Concussion    Fall    Head injury, closed, with concussion    Alcohol abuse       Past Medical History:   Diagnosis Date    ADHD (attention deficit hyperactivity disorder)     On going issue    Anxiety     Lepe's esophagus     Benign prostatic hyperplasia Surgery in 2021    Clotting disorder Intestinal    Depression     Diverticulitis     Diverticulosis     Duodenitis     Enteritis     Failure to thrive (child)     Family history of blood clots     GERD (gastroesophageal reflux disease)     Hyperlipidemia     Hypertension     Hypertensive emergency     Low testosterone     Microcytosis     Pancreatitis     Pneumonia     PONV (postoperative nausea and vomiting)     Seasonal affective disorder     Shoulder injury     Stroke     Unexplained weight loss        Past Surgical History:   Procedure Laterality Date    COLON SURGERY      COLONOSCOPY      COLONOSCOPY N/A 2022    Procedure: COLONOSCOPY INTO CECUM WITH HOT SNARE POLYPECTOMY;  Surgeon: Albert Gallo MD;  Location: [facility] ENDOSCOPY;  Service: Gastroenterology;  Laterality: N/A;  PRE- GI BLEED  POST- DIVERTICULOSIS, POLYP    ENDOSCOPY N/A  12/10/2022    Procedure: ESOPHAGOGASTRODUODENOSCOPY;  Surgeon: Albert Gallo MD;  Location: [facility] ENDOSCOPY;  Service: Gastroenterology;  Laterality: N/A;  PRE- DARK STOOLS  POST- BARRETTS ESOPHAGUS    FRACTURE SURGERY  1980    for broken arm    FRACTURE SURGERY      for broken hand    INCISION AND DRAINAGE ABSCESS  2015    anal    PROSTATE SURGERY      SMALL INTESTINE SURGERY      TONSILLECTOMY         SLP Recommendation and Plan                                                               SLP EVALUATION (last 72 hours)       SLP SLC Evaluation       Row Name 11/13/23 1330 11/13/23 1000 11/11/23 1400             Communication Assessment/Intervention    Document Type therapy note (daily note)  -AL therapy note (daily note)  -AL therapy note (daily note)  -      Patient/Family/Caregiver Comments/Observations Pt participated well.  -AL Pt participated well. Reported difficulty with working memory when watching sports this weekend.  -AL --      Patient Effort -- -- good  -TH         Pain Scale: Numbers Pre/Post-Treatment    Pretreatment Pain Rating 0/10 - no pain  -AL 0/10 - no pain  -AL --      Posttreatment Pain Rating -- 0/10 - no pain  -AL --                User Key  (r) = Recorded By, (t) = Taken By, (c) = Cosigned By      Initials Name Effective Dates    AL Yoselyn Gudino, MS CCC-SLP 06/16/21 -      Mercedes Summers, ANA LAURA 08/28/23 -                        EDUCATION    The patient has been educated in the following areas:       Cognitive Impairment.             SLP GOALS       Row Name 11/13/23 1330 11/13/23 1000 11/11/23 1400       Word Retrieval Skills Goal 1 (SLP)    Improve Word Retrieval Skills By Goal 1 (SLP) -- low frequency;responsive naming task;confrontational naming task;supplying an antonym;supplying a synonym;completing a divergent task;80%;independently (over 90% accuracy)  -AL low frequency;responsive naming task;confrontational naming task;supplying an antonym;supplying a  "synonym;completing a divergent task;80%;independently (over 90% accuracy)  -TH    Time Frame (Word Retrieval Goal 1, SLP) -- 1 week  -AL --    Progress/Outcomes (Word Retrieval Goal 1, SLP) -- good progress toward goal  -AL good progress toward goal  -TH    Comment (Word Retrieval Goal 1, SLP) -- Responsive naming with given word ending (\"[place]\"): 70% with NO cues, 100% with MIN cues.  -AL He completed responsive naming task w/ words that end in \"ist\" with 90% accuracy independently increasing to 100% with min cues.  -TH       Memory Skills Goal 1 (SLP)    Improve Memory Skills Through Goal 1 (SLP) -- recalling unrelated word lists with an imposed delay;listen to a paragraph and answer questions;repeat list in sequential order;recall details from a word list;use memory strategies;90%;independently (over 90% accuracy)  -AL recalling unrelated word lists with an imposed delay;listen to a paragraph and answer questions;repeat list in sequential order;recall details from a word list;use memory strategies;90%;independently (over 90% accuracy)  -TH    Time Frame (Memory Skills Goal 1, SLP) -- 1 week  -AL --    Progress/Outcomes (Memory Skills Goal 1, SLP) -- good progress toward goal  -AL good progress toward goal  -TH    Comment (Memory Skills Goal 1, SLP) -- 4 unit list retention task (word order): 60% with NO cues, 100% with MIN-MOD cues. Immediate memory for voicemails using note-takin% with NO cues, 100% with MIN cues.  -AL Mental manipulation task given 3 words; patient was asked to put them in the correct order; with rehearsal he was able to complete this task with 90% accuracy.  -TH       Reasoning Goal 1 (SLP)    Improve Reasoning Through Goal 1 (SLP) complete high level reasoning task;80%;independently (over 90% accuracy)  -AL -- --    Time Frame (Reasoning Goal 1, SLP) 1 week  -AL -- --    Progress/Outcomes (Reasoning Goal 1, SLP) good progress toward goal  -AL -- --    Comment (Reasoning Goal 1, SLP) " Reasoning for moderately complex logic puzzle: 100% with NO cues. Required NO cues for self-monitoring strategies.  -AL -- --       Functional Math Skills Goal 1 (SLP)    Improve Functional Math Skills Through Goal 1 (SLP) complete functional math task;90%;independently (over 90% accuracy)  -AL -- complete functional math task;90%;independently (over 90% accuracy)  -TH    Time Frame (Functional Math Skills Goal 1, SLP) 1 week  -AL -- --    Progress/Outcomes (Functional Math Skills Goal 1, SLP) good progress toward goal  -AL -- good progress toward goal  -TH    Comment (Functional Math Skills Goal 1, SLP) Home renovation math (calculating area): 60% with NO cues, 100% with MIN cues.  -AL -- He completed functional math/menu task with 80% accuracy w/  use of calculator. 3x6 deductive reasoning puzzle completed with approximately 75% accuracy independently increasing to 100% with min cues. Cues needed for attention to detail/problem solving.  -TH              User Key  (r) = Recorded By, (t) = Taken By, (c) = Cosigned By      Initials Name Provider Type    Yoselyn Shah MS CCC-SLP Speech and Language Pathologist     Mercedes Summers SLP Speech and Language Pathologist                                Time Calculation:        Time Calculation- SLP       Row Name 11/13/23 1356 11/13/23 1120          Time Calculation- SLP    SLP Start Time 1330  -AL 1000  -AL     SLP Stop Time 1400  -AL 1030  -AL     SLP Time Calculation (min) 30 min  -AL 30 min  -AL     SLP Received On 11/13/23  -AL 11/13/23  -AL               User Key  (r) = Recorded By, (t) = Taken By, (c) = Cosigned By      Initials Name Provider Type    Yoselyn Shah MS CCC-SLP Speech and Language Pathologist                      Therapy Charges for Today       Code Description Service Date Service Provider Modifiers Qty    59154088413 HC ST DEV OF COGN SKILLS INITIAL 15 MIN 11/13/2023 Yoselyn Gudino MS CCC-SLP  1    51244149111 HC ST DEV OF COGN  SKILLS EACH ADDT'L 15 MIN 11/13/2023 Yoselyn Gudino, MS CCC-SLP  3                             Yoselyn Gudino, MS CCC-SLP  11/13/2023

## 2023-11-13 NOTE — PROGRESS NOTES
Inpatient Rehabilitation Plan of Care Note    Plan of Care  Care Plan Reviewed - No updates at this time.    Safety    [RN] Potential for Injury(Active)  Current Status(11/13/2023): Uses call light appropriately  Weekly Goal(11/20/2023): Cues to use call light  Discharge Goal: Items within reach    Performed Intervention(s)  Items within reach  Safety rounds  Bed alarm/chair alarm      Psychosocial    [RN] Behavior(Active)  Current Status(11/13/2023): Anxiety related to hx of strokes and fall  Weekly Goal(11/16/2023): Allow opportunity to express concers regarding current  status.  Discharge Goal: Demonstrate healthy oping strategies.    Performed Intervention(s)  Medication  verbalize needs and concerns      Sphincter Control    [RN] Bowel Management(Active)  Current Status(11/13/2023): continent 100%  Weekly Goal(11/24/2023): continet 100%  Discharge Goal: continent 100%    [RN] Bladder Management(Active)  Current Status(11/13/2023): continent 100%  Weekly Goal(11/23/2023): Continent 100%  Discharge Goal: Continent 100%    Performed Intervention(s)  Therapeutic exercises/modalities  Elimination schedule  Encourage appropriate diet    Signed by: Anu Sotelo RN

## 2023-11-13 NOTE — PROGRESS NOTES
" LOS: 7 days   Patient Care Team:  Akash Rodriguez Jr., DO as PCP - General (Sports Medicine)      NIMESH ROCHA  1965      ADMITTING DIAGNOSIS:    Status post concussion  History of CVA-aspirin/Brilinta/statin    Subjective     Tolerates therapies.  Feels like he is making progress.  No new weakness.          Objective     Vitals:    11/13/23 1241   BP: 147/93   Pulse: 65   Resp: 18   Temp: 97.6 °F (36.4 °C)   SpO2: 95%       Intake/Output Summary (Last 24 hours) at 11/13/2023 1500  Last data filed at 11/13/2023 1321  Gross per 24 hour   Intake 960 ml   Output 1100 ml   Net -140 ml     PHYSICAL EXAM:      MENTAL STATUS -  AWAKE / ALERT  HEENT-     LUNGS - CTA, NO WHEEZES, RALES OR RHONCHI  HEART- RRR, NO RUB, MURMUR, OR GALLOP  ABD - NORMOACTIVE BOWEL SOUNDS, SOFT, NT.    EXT - NO EDEMA OR CYANOSIS  NEURO -oriented x4  Some slight word hesitancy  MOTOR EXAM - RUE/RLE 5/5. LUE/LLE 5/5.      MEDICATIONS  Scheduled Meds:amLODIPine, 5 mg, Oral, Daily  aspirin, 81 mg, Oral, Daily  atorvastatin, 80 mg, Oral, Nightly  carvedilol, 6.25 mg, Oral, BID With Meals  folic acid, 1 mg, Oral, Daily  hydrALAZINE, 100 mg, Oral, Q8H  hydroCHLOROthiazide Oral, 12.5 mg, Oral, Daily  lisinopril, 40 mg, Oral, Daily  melatonin, 10 mg, Oral, Nightly  multivitamin with minerals, 1 tablet, Oral, Daily  pantoprazole, 40 mg, Oral, Q AM  potassium chloride, 10 mEq, Oral, Daily  senna-docusate sodium, 2 tablet, Oral, BID  thiamine, 100 mg, Oral, Daily  ticagrelor, 60 mg, Oral, BID  vilazodone, 40 mg, Oral, Daily      Continuous Infusions:   PRN Meds:.  acetaminophen    senna-docusate sodium **AND** polyethylene glycol **AND** bisacodyl **AND** bisacodyl    hydrOXYzine      RESULTS  No results found for: \"POCGLU\"  Results from last 7 days   Lab Units 11/13/23  0831 11/09/23  0747   WBC 10*3/mm3 7.90 8.19   HEMOGLOBIN g/dL 13.9 13.2   HEMATOCRIT % 41.7 39.3   PLATELETS 10*3/mm3 356 278     Results from last 7 days   Lab Units " 11/13/23  0831 11/09/23  0747   SODIUM mmol/L 144 144   POTASSIUM mmol/L 3.4* 3.5   CHLORIDE mmol/L 105 107   CO2 mmol/L 26.0 25.9   BUN mg/dL 12 12   CREATININE mg/dL 1.11 0.94   CALCIUM mg/dL 9.6 9.4   GLUCOSE mg/dL 127* 109*           ASSESSMENT and PLAN    Concussion    Status post concussion     History of CVA-history of 15 x 6 mm  infarct in the inferior medial  left cerebellum and a tiny 3 mm infarct in the posterior left cerebellum  aspirin/Brilinta/statin     Impaired mobility  Impaired self-care  Impaired cognition    Visual dvldiwlguk-fmicioyc-joqwkgpam eye patch.  May possibly benefit from neuro optometry evaluation as an outpatient     Hypertension-amlodipine/carvedilol/hydralazine/lisinopril  Nov 9 - -162/91-97. Previously on spironlactone or hydrochlorothiazide. Will add hydrochlorothiazide 12.5 mg daily with KDUR 10 meq daily.  November 13-has some variability to his blood pressure range-follow pattern.     Hyperlipidemia     Gastroesophageal reflux disease     Depression-Viibryd     DVT prophylaxis-on dual antiplatelet therapy.  SCDs     GI bleed-evaluated by gastroenterology-Plan is to wait 3 months before he can come off of Brilinta to undergo possible colonoscopy.     TEAM CONF - NOV 9 - TRANSFERS CTG. 4 STAIRS CTG. GAIT 160 FEET RW CTG.   BATH MIN CTG. LBD MIN. UBD SBA. GROOMING SBA. TOILETING MIN. TOILET AND SHOWER TRANSFERS MIN.   Anxiety related to hx of strokes and fall . CONTINENT BOWEL AND BLADDER.   ATTENTION - Mildly impaired attention and visuospatial skills on CLQT.  Moderately impaired memory.  EXPRESSION - Mild anomia   ELOS - TWO WEEKS.        Goal is for home with home health   therapies.  Barrier to discharge:.  Mobility and self-care- work on strength balance transfers gait actives of daily living to overcome.          Pk Snow MD      Appropriate PPE was worn during the entire visit.  Hand hygiene was completed before and after.       no stemi

## 2023-11-13 NOTE — PROGRESS NOTES
Inpatient Rehabilitation Functional Measures Assessment and Plan of Care    Plan of Care  Updated Problems/Interventions  Self Care    [OT] Bathing(Active)  Current Status(11/13/2023): SBA/CGA  Weekly Goal(11/21/2023): SBA/Mod I  Discharge Goal: SBA/Mod I    [OT] Dressing (Lower)(Active)  Current Status(11/13/2023): CGA/SBA  Weekly Goal(11/20/2023): SBA/Mod I  Discharge Goal: SBa/Mod I    [OT] Dressing (Upper)(Active)  Current Status(11/13/2023): set up  Weekly Goal(11/21/2023): set up/Mod I  Discharge Goal: set up/Mod I    [OT] Eating(Active)  Current Status(11/13/2023): I  Weekly Goal(11/13/2023): I  Discharge Goal: I    [OT] Grooming(Active)  Current Status(11/13/2023): set up  Weekly Goal(11/20/2023): set up/Mod I  Discharge Goal: Set up/Mod I    [OT] Toileting(Active)  Current Status(11/13/2023): CGA/SBA  Weekly Goal(11/21/2023): MOD I  Discharge Goal: MOd I        Mobility    [OT] Toilet Transfers(Active)  Current Status(11/13/2023): CGA/SBA  Weekly Goal(11/21/2023): SBA/MOD I  Discharge Goal: SBA/Mod I    [OT] Tub/Shower Transfers(Active)  Current Status(11/13/2023): CGA  Weekly Goal(11/20/2023): SBA/Mod I  Discharge Goal: SBa/Mod I    Functional Measures  CAROL Eating:  Branch  CAROL Grooming: Branch  CAROL Bathing:  Branch  CAROL Upper Body Dressing:  Branch  CAROL Lower Body Dressing:  Branch  CAROL Toileting:  Branch    CAROL Bladder Management  Level of Assistance:  Branch  Frequency/Number of Accidents this Shift:  Branch    CAROL Bowel Management  Level of Assistance: Branch  Frequency/Number of Accidents this Shift: Branch    CAROL Bed/Chair/Wheelchair Transfer:  Branch  CAROL Toilet Transfer:  Branch  CAROL Tub/Shower Transfer:  Branch    Previously Documented Mode of Locomotion at Discharge: Field  CAROL Expected Mode of Locomotion at Discharge: Branch  CAROL Walk/Wheelchair:  Branch  CAROL Stairs:  Branch    CAROL Comprehension:  Branch  CAROL Expression:  Branch  CAROL Social Interaction:  Branch  CAROL Problem Solving:  Branch  CAROL  Memory:  Branch    Therapy Mode Minutes  Occupational Therapy: Branch  Physical Therapy: Branch  Speech Language Pathology:  Branch    Signed by: BEATA Mejia/SAMMY

## 2023-11-13 NOTE — THERAPY TREATMENT NOTE
Inpatient Rehabilitation - Physical Therapy Treatment Note       [facility]     Patient Name: Flako Coreas  : 1965  MRN: [id number]    Today's Date: 2023                    Admit Date: 2023      Visit Dx:   No diagnosis found.    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    Acute CVA (cerebrovascular accident)    HTN (hypertension)    Late effect of cerebrovascular accident (CVA)    Concussion    Fall    Head injury, closed, with concussion    Alcohol abuse       Past Medical History:   Diagnosis Date    ADHD (attention deficit hyperactivity disorder)     On going issue    Anxiety     Lepe's esophagus     Benign prostatic hyperplasia Surgery in 2021    Clotting disorder Intestinal    Depression     Diverticulitis     Diverticulosis     Duodenitis     Enteritis     Failure to thrive (child)     Family history of blood clots     GERD (gastroesophageal reflux disease)     Hyperlipidemia     Hypertension     Hypertensive emergency     Low testosterone     Microcytosis     Pancreatitis     Pneumonia     PONV (postoperative nausea and vomiting)     Seasonal affective disorder     Shoulder injury     Stroke     Unexplained weight loss        Past Surgical History:   Procedure Laterality Date    COLON SURGERY      COLONOSCOPY      COLONOSCOPY N/A 2022    Procedure: COLONOSCOPY INTO CECUM WITH HOT SNARE POLYPECTOMY;  Surgeon: Albert Gallo MD;  Location: [facility] ENDOSCOPY;  Service: Gastroenterology;  Laterality: N/A;  PRE- GI BLEED  POST- DIVERTICULOSIS, POLYP    ENDOSCOPY N/A 12/10/2022     Procedure: ESOPHAGOGASTRODUODENOSCOPY;  Surgeon: Albert Gallo MD;  Location: [facility] ENDOSCOPY;  Service: Gastroenterology;  Laterality: N/A;  PRE- DARK STOOLS  POST- BARRETTS ESOPHAGUS    FRACTURE SURGERY  1980    for broken arm    FRACTURE SURGERY      for broken hand    INCISION AND DRAINAGE ABSCESS  2015    anal    PROSTATE SURGERY      SMALL INTESTINE SURGERY      TONSILLECTOMY         PT ASSESSMENT (last 12 hours)       IRF PT Evaluation and Treatment       Row Name 11/13/23 0910          PT Time and Intention    Document Type daily treatment  -MD     Mode of Treatment physical therapy  -MD     Patient/Family/Caregiver Comments/Observations Pt sitting in WC showing no signs of acute distress.  -MD       Row Name 11/13/23 0910          General Information    Existing Precautions/Restrictions fall  -MD       Row Name 11/13/23 0910          Pain Assessment    Pretreatment Pain Rating 0/10 - no pain  -MD       Row Name 11/13/23 0910          Cognition/Psychosocial    Orientation Status (Cognition) oriented x 4  -MD     Follows Commands (Cognition) follows one-step commands;over 90% accuracy  -MD     Personal Safety Interventions fall prevention program maintained;gait belt  -MD       Row Name 11/13/23 0910          Bed Mobility    Supine-Sit [place] (Bed Mobility) standby assist  -MD     Sit-Supine [place] (Bed Mobility) standby assist  -MD       Row Name 11/13/23 0910          Bed-Chair Transfer    Bed-Chair [place] (Transfers) verbal cues;contact guard;minimum assist (75% patient effort)  -MD     Assistive Device (Bed-Chair Transfers) wheelchair  -MD       Row Name 11/13/23 0910          Chair-Bed Transfer    Chair-Bed [place] (Transfers) verbal cues;contact guard;minimum assist (75% patient effort)  -MD     Assistive Device (Chair-Bed Transfers) wheelchair  -MD       Row Name 11/13/23 0910          Sit-Stand Transfer    Sit-Stand [place] (Transfers) contact guard  -MD      Assistive Device (Sit-Stand Transfers) wheelchair;walker, front-wheeled  -MD       Row Name 11/13/23 0910          Stand-Sit Transfer    Stand-Sit [place] (Transfers) contact guard  -MD     Assistive Device (Stand-Sit Transfers) wheelchair;walker, front-wheeled  -MD       Row Name 11/13/23 0910          Gait/Stairs (Locomotion)    [place] Level (Gait) verbal cues;minimum assist (75% patient effort)  -MD     Assistive Device (Gait) walker, front-wheeled  -MD     Distance in Feet (Gait) 80'  x1 and 160'x2  -MD     Deviations/Abnormal Patterns (Gait) gait speed decreased;base of support, narrow;scissoring;ataxic  -MD     Bilateral Gait Deviations forward flexed posture  -MD     Left Sided Gait Deviations foot drop/toe drag;heel strike decreased  -MD     Gait Assessment/Intervention 2.5lb ankle weights to help control ataxia.  -MD       Row Name 11/13/23 0910          Balance    Static Standing Balance verbal cues;minimal assist;moderate assist  -MD     Position/Device Used, Standing Balance deana-bars  -MD     Balance Interventions standing;static;foam;core stability exercise;trunk training exercise;weight shifting activity  -MD     Comment, Balance standing on foam feet apart 3x30 sec each.  standing on foam marching w L UE support.  standing on solid ground marching w Min-CGA and L UE support  -MD       Row Name 11/13/23 0910          Motor Skills    Therapeutic Exercise aerobic  -MD       Row Name 11/13/23 0910          Aerobic Exercise    Type (Aerobic Exercise) recumbent elliptical ;for 5 minutes  x2  -MD     Time Performed (Aerobic Exercise) 5 min x2 w 1-2 min rest break  -MD     Comment, Aerobic Exercise (Therapeutic Exercise) seat at level 10.  workload level 3  -MD       Row Name 11/13/23 0910          Positioning and Restraints    Pre-Treatment Position sitting in chair/recliner  -MD     Post Treatment Position wheelchair  -MD     In Wheelchair sitting;exit alarm on  -MD               User Key   (r) = Recorded By, (t) = Taken By, (c) = Cosigned By      Initials Name Provider Type    Trinity Dash, PT Physical Therapist                     Physical Therapy Education       Title: PT OT SLP Therapies (Done)       Topic: Physical Therapy (Done)       Point: Mobility training (Done)       Learning Progress Summary             Patient Acceptance, E,D, VU,DU by DP at 11/8/2023 1551                         Point: Home exercise program (Done)       Learning Progress Summary             Patient Acceptance, E,D, VU,DU by DP at 11/8/2023 1551                         Point: Body mechanics (Done)       Learning Progress Summary             Patient Acceptance, E,D, VU,DU by DP at 11/8/2023 1551                         Point: Precautions (Done)       Learning Progress Summary             Patient Acceptance, E, VU by MD at 11/13/2023 0912    Acceptance, E, VU by MD at 11/11/2023 0927    Acceptance, E, VU by MD at 11/10/2023 1117    Acceptance, E, VU by MD at 11/9/2023 0840    Acceptance, E,D, VU,DU by DP at 11/8/2023 1551    Acceptance, E, VU by MD at 11/7/2023 1605                                         User Key       Initials Effective Dates Name Provider Type Discipline    MD 06/16/21 -  Trinity Velez, PT Physical Therapist PT    FINA 08/24/21 -  Papo Houston PT Physical Therapist PT                    PT Recommendation and Plan    Frequency of Treatment (PT): 5 times per week, 60 minutes per session  Anticipated Equipment Needs at Discharge (PT Eval): front wheeled walker                  Time Calculation:      PT Charges       Row Name 11/13/23 1415 11/13/23 0910          Time Calculation    Start Time 1400  -MD 0900  -MD     Stop Time 1430  -MD 0930  -MD     Time Calculation (min) 30 min  -MD 30 min  -MD     PT Received On -- 11/13/23  -MD     PT - Next Appointment -- 11/14/23  -MD               User Key  (r) = Recorded By, (t) = Taken By, (c) = Cosigned By      Initials Name Provider Type    Trinity Dash, PT  Physical Therapist                    Therapy Charges for Today       Code Description Service Date Service Provider Modifiers Qty    14427365698  GAIT TRAINING EA 15 MIN 11/13/2023 Trinity Velez, PT GP 2    83864458211 HC PT THER PROC EA 15 MIN 11/13/2023 Trinity Velez, PT GP 2              PT G-Codes  AM-PAC 6 Clicks Score (PT): 24      Trinity Velez, PT  11/13/2023

## 2023-11-14 ENCOUNTER — TELEPHONE (OUTPATIENT)
Dept: NEUROLOGY | Facility: CLINIC | Age: 58
End: 2023-11-14
Payer: COMMERCIAL

## 2023-11-14 PROCEDURE — 97116 GAIT TRAINING THERAPY: CPT

## 2023-11-14 PROCEDURE — 97112 NEUROMUSCULAR REEDUCATION: CPT

## 2023-11-14 PROCEDURE — 97112 NEUROMUSCULAR REEDUCATION: CPT | Performed by: OCCUPATIONAL THERAPIST

## 2023-11-14 PROCEDURE — 97535 SELF CARE MNGMENT TRAINING: CPT | Performed by: OCCUPATIONAL THERAPIST

## 2023-11-14 PROCEDURE — 97130 THER IVNTJ EA ADDL 15 MIN: CPT

## 2023-11-14 PROCEDURE — 97129 THER IVNTJ 1ST 15 MIN: CPT

## 2023-11-14 PROCEDURE — 97530 THERAPEUTIC ACTIVITIES: CPT

## 2023-11-14 RX ADMIN — DOCUSATE SODIUM 50MG AND SENNOSIDES 8.6MG 2 TABLET: 8.6; 5 TABLET, FILM COATED ORAL at 09:30

## 2023-11-14 RX ADMIN — CARVEDILOL 6.25 MG: 6.25 TABLET, FILM COATED ORAL at 09:30

## 2023-11-14 RX ADMIN — CARVEDILOL 6.25 MG: 6.25 TABLET, FILM COATED ORAL at 17:31

## 2023-11-14 RX ADMIN — PANTOPRAZOLE SODIUM 40 MG: 40 TABLET, DELAYED RELEASE ORAL at 06:22

## 2023-11-14 RX ADMIN — MULTIPLE VITAMINS W/ MINERALS TAB 1 TABLET: TAB at 09:29

## 2023-11-14 RX ADMIN — HYDRALAZINE HYDROCHLORIDE 100 MG: 50 TABLET, FILM COATED ORAL at 21:08

## 2023-11-14 RX ADMIN — VILAZODONE HYDROCHLORIDE 40 MG: 40 TABLET, FILM COATED ORAL at 09:29

## 2023-11-14 RX ADMIN — HYDROCHLOROTHIAZIDE 12.5 MG: 12.5 TABLET ORAL at 09:29

## 2023-11-14 RX ADMIN — LISINOPRIL 40 MG: 40 TABLET ORAL at 09:29

## 2023-11-14 RX ADMIN — ATORVASTATIN CALCIUM 80 MG: 80 TABLET, FILM COATED ORAL at 21:00

## 2023-11-14 RX ADMIN — TICAGRELOR 60 MG: 60 TABLET ORAL at 21:00

## 2023-11-14 RX ADMIN — HYDROXYZINE HYDROCHLORIDE 50 MG: 25 TABLET ORAL at 14:34

## 2023-11-14 RX ADMIN — ASPIRIN 81 MG: 81 TABLET, COATED ORAL at 09:29

## 2023-11-14 RX ADMIN — Medication 10 MG: at 20:59

## 2023-11-14 RX ADMIN — FOLIC ACID 1 MG: 1 TABLET ORAL at 09:29

## 2023-11-14 RX ADMIN — HYDRALAZINE HYDROCHLORIDE 100 MG: 50 TABLET, FILM COATED ORAL at 06:22

## 2023-11-14 RX ADMIN — Medication 100 MG: at 09:29

## 2023-11-14 RX ADMIN — DOCUSATE SODIUM 50MG AND SENNOSIDES 8.6MG 2 TABLET: 8.6; 5 TABLET, FILM COATED ORAL at 21:00

## 2023-11-14 RX ADMIN — AMLODIPINE BESYLATE 5 MG: 5 TABLET ORAL at 09:29

## 2023-11-14 RX ADMIN — TICAGRELOR 60 MG: 60 TABLET ORAL at 09:30

## 2023-11-14 RX ADMIN — POTASSIUM CHLORIDE 10 MEQ: 750 TABLET, EXTENDED RELEASE ORAL at 09:29

## 2023-11-14 NOTE — THERAPY TREATMENT NOTE
Inpatient Rehabilitation - Physical Therapy Treatment Note       [facility]     Patient Name: Flako Coreas  : 1965  MRN: [id number]    Today's Date: 2023                    Admit Date: 2023      Visit Dx:   No diagnosis found.    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    Acute CVA (cerebrovascular accident)    HTN (hypertension)    Late effect of cerebrovascular accident (CVA)    Concussion    Fall    Head injury, closed, with concussion    Alcohol abuse       Past Medical History:   Diagnosis Date    ADHD (attention deficit hyperactivity disorder)     On going issue    Anxiety     Lepe's esophagus     Benign prostatic hyperplasia Surgery in 2021    Clotting disorder Intestinal    Depression     Diverticulitis     Diverticulosis     Duodenitis     Enteritis     Failure to thrive (child)     Family history of blood clots     GERD (gastroesophageal reflux disease)     Hyperlipidemia     Hypertension     Hypertensive emergency     Low testosterone     Microcytosis     Pancreatitis     Pneumonia     PONV (postoperative nausea and vomiting)     Seasonal affective disorder     Shoulder injury     Stroke     Unexplained weight loss        Past Surgical History:   Procedure Laterality Date    COLON SURGERY      COLONOSCOPY      COLONOSCOPY N/A 2022    Procedure: COLONOSCOPY INTO CECUM WITH HOT SNARE POLYPECTOMY;  Surgeon: Albert Gallo MD;  Location: [facility] ENDOSCOPY;  Service: Gastroenterology;  Laterality: N/A;  PRE- GI BLEED  POST- DIVERTICULOSIS, POLYP    ENDOSCOPY N/A 12/10/2022     Procedure: ESOPHAGOGASTRODUODENOSCOPY;  Surgeon: Albert Gallo MD;  Location: [facility] ENDOSCOPY;  Service: Gastroenterology;  Laterality: N/A;  PRE- DARK STOOLS  POST- BARRETTS ESOPHAGUS    FRACTURE SURGERY  1980    for broken arm    FRACTURE SURGERY      for broken hand    INCISION AND DRAINAGE ABSCESS  2015    anal    PROSTATE SURGERY      SMALL INTESTINE SURGERY      TONSILLECTOMY         PT ASSESSMENT (last 12 hours)       IRF PT Evaluation and Treatment       Row Name 11/14/23 1402 11/14/23 1115       PT Time and Intention    Document Type daily treatment  -LB daily treatment  -MD    Mode of Treatment physical therapy  -LB physical therapy  -MD    Patient/Family/Caregiver Comments/Observations -- Pt sitting in WC showing no signs of acute distress.  -MD      Row Name 11/14/23 1402 11/14/23 1115       General Information    Existing Precautions/Restrictions fall  -LB fall  -MD      Row Name 11/14/23 1402 11/14/23 1115       Pain Assessment    Pretreatment Pain Rating 0/10 - no pain  -LB 0/10 - no pain  -MD    Posttreatment Pain Rating 0/10 - no pain  -LB --      Row Name 11/14/23 1402 11/14/23 1115       Cognition/Psychosocial    Orientation Status (Cognition) -- oriented x 4  -MD    Follows Commands (Cognition) follows one-step commands;75-90% accuracy;repetition of directions required  -LB follows one-step commands;75-90% accuracy;repetition of directions required  -MD    Personal Safety Interventions fall prevention program maintained;gait belt;nonskid shoes/slippers when out of bed;muscle strengthening facilitated  -LB fall prevention program maintained;gait belt  -MD      Row Name 11/14/23 1402          Bed Mobility    Supine-Sit [place] (Bed Mobility) independent  -LB       Row Name 11/14/23 1402          Bed-Chair Transfer    Bed-Chair [place] (Transfers) verbal cues;contact guard  -LB     Assistive Device (Bed-Chair Transfers) wheelchair  -LB       Row Name 11/14/23 1402 11/14/23 1116        Sit-Stand Transfer    Sit-Stand [place] (Transfers) verbal cues;contact guard  -LB verbal cues;contact guard  -MD    Assistive Device (Sit-Stand Transfers) wheelchair;walker, front-wheeled  -LB wheelchair;walker, front-wheeled  -MD      Row Name 11/14/23 1402 11/14/23 1115       Stand-Sit Transfer    Stand-Sit [place] (Transfers) contact guard  -LB contact guard  -MD    Assistive Device (Stand-Sit Transfers) wheelchair;walker, front-wheeled  -LB wheelchair;walker, front-wheeled  -MD    Comment, (Stand-Sit Transfer) -- VC to turn all the way around  -MD      Row Name 11/14/23 1402 11/14/23 1115       Gait/Stairs (Locomotion)    [place] Level (Gait) verbal cues;minimum assist (75% patient effort)  -LB verbal cues;minimum assist (75% patient effort)  -MD    Assistive Device (Gait) walker, front-wheeled  -LB walker, front-wheeled  -MD    Distance in Feet (Gait) 30  -LB 80'x1 w/o weights and 80'x3 w 1.5lb ankle weights  -MD    Deviations/Abnormal Patterns (Gait) gait speed decreased;base of support, narrow;scissoring;ataxic  -LB gait speed decreased;base of support, narrow;scissoring;ataxic  -MD    Bilateral Gait Deviations forward flexed posture  -LB forward flexed posture  -MD    Left Sided Gait Deviations -- foot drop/toe drag;heel strike decreased  -MD    Gait Assessment/Intervention varying step length and speed  -LB --    [place] Level (Stairs) -- verbal cues;contact guard  -MD    Handrail Location (Stairs) -- right side (ascending);left side (descending)  -MD    Number of Steps (Stairs) -- 12  -MD    Ascending Technique (Stairs) -- step-over-step  -MD    Descending Technique (Stairs) -- step-over-step  -MD      Row Name 11/14/23 1402          Safety Issues, Functional Mobility    Impairments Affecting Function (Mobility) balance;coordination;strength  -LB       Row Name 11/14/23 1402          Balance    Balance Assessment/Interventions Stepping to a 6 inch step while standing on foam square  with Min A without UE support; semitandem positition on foam with CGA/Min without UE support. Stepping over a 3 inch wide object and back again with cues control and speed.  Tapping toes to 2 different hieght of cones with UE support and CGA  -LB       Row Name 11/14/23 1402 11/14/23 1115       Positioning and Restraints    Pre-Treatment Position sitting in chair/recliner  -LB in bed  -MD    Post Treatment Position bed  -LB bed  -MD    In Bed supine;call light within reach;encouraged to call for assist;exit alarm on  -LB supine;call light within reach;exit alarm on  -MD      Row Name 11/14/23 1115          Weekly Progress Summary (PT)    Weekly Progress Summary (PT) Pt progressing demonstrating progress w functional mobility this week.  Pt requires less assistance w transfers and ambulation.  Pt continues to present w ataxic gait pattern limiting his independence w ambulation at this time.  Pt also presents w limited carry over throughout session limiting his over all progress and safety.  Pt will continue to benifit from skilled PT to address mobility defiicts and increase safety and independence w functional mobility.  -MD       Row Name 11/14/23 1115          Bed Mobility Goal 1 (PT-IRF)    Progress/Outcomes (Bed Mobility Goal 1, PT-IRF) goal ongoing  -MD       Row Name 11/14/23 1115          Transfer Goal 1 (PT-IRF)    Progress/Outcomes (Transfer Goal 1, PT-IRF) goal ongoing  -MD       Row Name 11/14/23 1115          Gait/Walking Locomotion Goal 1 (PT-IRF)    Progress/Outcomes (Gait/Walking Locomotion Goal 1, PT-IRF) goal ongoing  -MD       Row Name 11/14/23 1115          Stairs Goal 1 (PT-IRF)    Progress/Outcomes (Stairs Goal 1, PT-IRF) goal ongoing  -MD               User Key  (r) = Recorded By, (t) = Taken By, (c) = Cosigned By      Initials Name Provider Type    Alisa Mccloud PT Physical Therapist    Trinity Dash PT Physical Therapist                     Physical Therapy Education       Title: PT OT  SLP Therapies (Done)       Topic: Physical Therapy (Done)       Point: Mobility training (Done)       Learning Progress Summary             Patient Acceptance, E,D, VU,DU by DP at 11/8/2023 1551                         Point: Home exercise program (Done)       Learning Progress Summary             Patient Acceptance, E,D, VU,DU by DP at 11/8/2023 1551                         Point: Body mechanics (Done)       Learning Progress Summary             Patient Acceptance, E,D, VU,DU by DP at 11/8/2023 1551                         Point: Precautions (Done)       Learning Progress Summary             Patient Acceptance, E, VU by MD at 11/14/2023 1606    Acceptance, E, VU by MD at 11/13/2023 0912    Acceptance, E, VU by MD at 11/11/2023 0927    Acceptance, E, VU by MD at 11/10/2023 1117    Acceptance, E, VU by MD at 11/9/2023 0840    Acceptance, E,D, VU,DU by DP at 11/8/2023 1551    Acceptance, E, VU by MD at 11/7/2023 1605                                         User Key       Initials Effective Dates Name Provider Type Discipline    MD 06/16/21 -  Trinity Velez, PT Physical Therapist PT    DP 08/24/21 -  Papo Houston, PT Physical Therapist PT                    PT Recommendation and Plan                          Time Calculation:      PT Charges       Row Name 11/14/23 1608 11/14/23 1114          Time Calculation    Start Time 1400  -LB 1100  -MD     Stop Time 1430  -LB 1130  -MD     Time Calculation (min) 30 min  -KALYANI 30 min  -MD     PT Received On 11/14/23  -LB 11/14/23  -MD     PT - Next Appointment 11/15/23  -KALYANI 11/15/23  -MD     PT Goal Re-Cert Due Date -- 11/21/23  -MD               User Key  (r) = Recorded By, (t) = Taken By, (c) = Cosigned By      Initials Name Provider Type    Alisa Mccloud, PT Physical Therapist    Trinity Dash, PT Physical Therapist                    Therapy Charges for Today       Code Description Service Date Service Provider Modifiers Qty    54921732582 HC PT NEUROMUSC RE EDUCATION EA 15  MIN 11/14/2023 Alisa Rudd, PT GP 2              PT G-Codes  AM-PAC 6 Clicks Score (PT): 24      Alisa Rudd, PT  11/14/2023

## 2023-11-14 NOTE — PROGRESS NOTES
Inpatient Rehabilitation Plan of Care Note    Plan of Care  Care Plan Reviewed - No updates at this time.    Safety    Performed Intervention(s)  Items within reach  Safety rounds  Bed alarm/chair alarm      Psychosocial    Performed Intervention(s)  Medication  verbalize needs and concerns  suicidal precautions placed today      Sphincter Control    Performed Intervention(s)  Therapeutic exercises/modalities  Elimination schedule  Encourage appropriate diet    Signed by: Sherri Clark RN

## 2023-11-14 NOTE — THERAPY TREATMENT NOTE
Inpatient Rehabilitation - Speech Language Pathology Treatment Note    [facility]     Patient Name: Flako Coreas  : 1965  MRN: [id number]    Today's Date: 2023                   Admit Date: 2023       Visit Dx:    No diagnosis found.    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    Acute CVA (cerebrovascular accident)    HTN (hypertension)    Late effect of cerebrovascular accident (CVA)    Concussion    Fall    Head injury, closed, with concussion    Alcohol abuse       Past Medical History:   Diagnosis Date    ADHD (attention deficit hyperactivity disorder)     On going issue    Anxiety     Lepe's esophagus     Benign prostatic hyperplasia Surgery in 2021    Clotting disorder Intestinal    Depression     Diverticulitis     Diverticulosis     Duodenitis     Enteritis     Failure to thrive (child)     Family history of blood clots     GERD (gastroesophageal reflux disease)     Hyperlipidemia     Hypertension     Hypertensive emergency     Low testosterone     Microcytosis     Pancreatitis     Pneumonia     PONV (postoperative nausea and vomiting)     Seasonal affective disorder     Shoulder injury     Stroke     Unexplained weight loss        Past Surgical History:   Procedure Laterality Date    COLON SURGERY      COLONOSCOPY      COLONOSCOPY N/A 2022    Procedure: COLONOSCOPY INTO CECUM WITH HOT SNARE POLYPECTOMY;  Surgeon: Albert Gallo MD;  Location: [facility] ENDOSCOPY;  Service: Gastroenterology;  Laterality: N/A;  PRE- GI BLEED  POST- DIVERTICULOSIS, POLYP    ENDOSCOPY N/A  12/10/2022    Procedure: ESOPHAGOGASTRODUODENOSCOPY;  Surgeon: Albert Gallo MD;  Location: [facility] ENDOSCOPY;  Service: Gastroenterology;  Laterality: N/A;  PRE- DARK STOOLS  POST- BARRETTS ESOPHAGUS    FRACTURE SURGERY  1980    for broken arm    FRACTURE SURGERY      for broken hand    INCISION AND DRAINAGE ABSCESS  2015    anal    PROSTATE SURGERY      SMALL INTESTINE SURGERY      TONSILLECTOMY         SLP Recommendation and Plan                                                               SLP EVALUATION (last 72 hours)       SLP SLC Evaluation       Row Name 11/14/23 1300 11/14/23 1030 11/13/23 1330 11/13/23 1000          Communication Assessment/Intervention    Document Type therapy note (daily note)  -AL therapy note (daily note)  -AL therapy note (daily note)  -AL therapy note (daily note)  -AL     Patient/Family/Caregiver Comments/Observations Pt participated well.  -AL Pt participated well.  -AL Pt participated well.  -AL Pt participated well. Reported difficulty with working memory when watching sports this weekend.  -AL        Pain Scale: Numbers Pre/Post-Treatment    Pretreatment Pain Rating 0/10 - no pain  -AL 0/10 - no pain  -AL 0/10 - no pain  -AL 0/10 - no pain  -AL     Posttreatment Pain Rating -- -- -- 0/10 - no pain  -AL               User Key  (r) = Recorded By, (t) = Taken By, (c) = Cosigned By      Initials Name Effective Dates    Yoselyn Shah, MS CCC-SLP 06/16/21 -                        EDUCATION    The patient has been educated in the following areas:       Cognitive Impairment Communication Impairment.             SLP GOALS       Row Name 11/14/23 1300 11/14/23 1030 11/13/23 1330       Patient will demonstrate functional cognitive-linguistic skills for return to discharge environment    [place] -- with use of compensatory strategies  -AL --    Time frame -- by discharge  -AL --    Progress/Outcomes -- good progress toward goal  -AL --       Word Retrieval Skills Goal 1 (SLP)  "   Comment (Word Retrieval Goal 1, SLP) -- Pt answered a mock interview question with initial fillers \"um\" and revisions noted; however, he was then able to communicate a complex story clearly and answered follow-up questions from clinician with NO cues.  -AL --       Memory Skills Goal 1 (SLP)    Improve Memory Skills Through Goal 1 (SLP) -- recalling unrelated word lists with an imposed delay;listen to a paragraph and answer questions;repeat list in sequential order;recall details from a word list;use memory strategies;90%;independently (over 90% accuracy)  -AL --    Time Frame (Memory Skills Goal 1, SLP) -- 1 week  -AL --    Progress/Outcomes (Memory Skills Goal 1, SLP) -- good progress toward goal  -AL --    Comment (Memory Skills Goal 1, SLP) -- 4 unit list retention task (word ordering): 20% with NO cues, 70% with repetition, 80% with MIN cues, 100% with MOD cues. Immediate memory for voicemails using note-taking strategy: 85% with NO cues, 100% with MIN cues.  -AL --       Reasoning Goal 1 (SLP)    Improve Reasoning Through Goal 1 (SLP) complete high level reasoning task;80%;independently (over 90% accuracy)  -AL -- complete high level reasoning task;80%;independently (over 90% accuracy)  -AL    Time Frame (Reasoning Goal 1, SLP) 1 week  -AL -- 1 week  -AL    Progress/Outcomes (Reasoning Goal 1, SLP) -- -- good progress toward goal  -AL    Comment (Reasoning Goal 1, SLP) Reasoning for moderately complex logic puzzle: 100% with NO cues thus far. Pt received phone call cancelling an MD appointment during this task. He became upset and was unable to focus on completion of task. SLP contacted to nurse assist him with rescheduling his appointment.  -AL -- Reasoning for moderately complex logic puzzle: 100% with NO cues. Required NO cues for self-monitoring strategies.  -AL       Functional Math Skills Goal 1 (SLP)    Improve Functional Math Skills Through Goal 1 (SLP) complete functional math " "task;90%;independently (over 90% accuracy)  -AL -- complete functional math task;90%;independently (over 90% accuracy)  -AL    Time Frame (Functional Math Skills Goal 1, SLP) 1 week  -AL -- 1 week  -AL    Progress/Outcomes (Functional Math Skills Goal 1, SLP) good progress toward goal  -AL -- good progress toward goal  -AL    Comment (Functional Math Skills Goal 1, SLP) Home renovation math (calculating area): 80% with NO cues, 100% with MIN cues. Mild impulsivity noted during this task.  -AL -- Home renovation math (calculating area): 60% with NO cues, 100% with MIN cues.  -AL      Row Name 23 1000             Word Retrieval Skills Goal 1 (SLP)    Improve Word Retrieval Skills By Goal 1 (SLP) low frequency;responsive naming task;confrontational naming task;supplying an antonym;supplying a synonym;completing a divergent task;80%;independently (over 90% accuracy)  -AL      Time Frame (Word Retrieval Goal 1, SLP) 1 week  -AL      Progress/Outcomes (Word Retrieval Goal 1, SLP) good progress toward goal  -AL      Comment (Word Retrieval Goal 1, SLP) Responsive naming with given word ending (\"rowan\"): 70% with NO cues, 100% with MIN cues.  -AL         Memory Skills Goal 1 (SLP)    Improve Memory Skills Through Goal 1 (SLP) recalling unrelated word lists with an imposed delay;listen to a paragraph and answer questions;repeat list in sequential order;recall details from a word list;use memory strategies;90%;independently (over 90% accuracy)  -AL      Time Frame (Memory Skills Goal 1, SLP) 1 week  -AL      Progress/Outcomes (Memory Skills Goal 1, SLP) good progress toward goal  -AL      Comment (Memory Skills Goal 1, SLP) 4 unit list retention task (word order): 60% with NO cues, 100% with MIN-MOD cues. Immediate memory for voicemails using note-takin% with NO cues, 100% with MIN cues.  -AL                User Key  (r) = Recorded By, (t) = Taken By, (c) = Cosigned By      Initials Name Provider Type    AL Shahab, " Yoselyn Blanchard, MS CCC-SLP Speech and Language Pathologist                                Time Calculation:        Time Calculation- SLP       Row Name 11/14/23 1445 11/14/23 1121          Time Calculation- SLP    SLP Start Time 1300  -AL 1030  -AL     SLP Stop Time 1330  -AL 1100  -AL     SLP Time Calculation (min) 30 min  -AL 30 min  -AL     SLP Received On 11/14/23  -AL 11/14/23  -AL               User Key  (r) = Recorded By, (t) = Taken By, (c) = Cosigned By      Initials Name Provider Type    Yoselyn Shah, MS CCC-SLP Speech and Language Pathologist                      Therapy Charges for Today       Code Description Service Date Service Provider Modifiers Qty    64221911542 HC ST DEV OF COGN SKILLS INITIAL 15 MIN 11/13/2023 Yoselyn Gudino, MS CCC-SLP  1    79959659323 HC ST DEV OF COGN SKILLS EACH ADDT'L 15 MIN 11/13/2023 Yoselyn Gudino, MS CCC-SLP  3    19977399807 HC ST DEV OF COGN SKILLS INITIAL 15 MIN 11/14/2023 Yoselyn Gudino, MS CCC-SLP  1    40650080431 HC ST DEV OF COGN SKILLS EACH ADDT'L 15 MIN 11/14/2023 Yoselyn Gudino, MS CCC-SLP  3                             Yoselyn Gudino MS CCC-SLP  11/14/2023

## 2023-11-14 NOTE — PROGRESS NOTES
" LOS: 8 days   Patient Care Team:  Akash Rodriguez Jr., DO as PCP - General (Sports Medicine)      NIMESH ROCHA  1965      ADMITTING DIAGNOSIS:    Status post concussion  History of CVA-aspirin/Brilinta/statin    Subjective     Tolerates therapies.  Feels like he is making progress.  No new weakness.          Objective     Vitals:    11/14/23 1730   BP: 156/90   Pulse: 62   Resp:    Temp:    SpO2:        Intake/Output Summary (Last 24 hours) at 11/14/2023 1825  Last data filed at 11/14/2023 1800  Gross per 24 hour   Intake 358 ml   Output 1175 ml   Net -817 ml     PHYSICAL EXAM:      MENTAL STATUS -  AWAKE / ALERT  HEENT-     LUNGS - CTA, NO WHEEZES, RALES OR RHONCHI  HEART- RRR, NO RUB, MURMUR, OR GALLOP  ABD - NORMOACTIVE BOWEL SOUNDS, SOFT, NT.    EXT - NO EDEMA OR CYANOSIS  NEURO -oriented x4    MOTOR EXAM - RUE/RLE 5/5. LUE/LLE 5/5.      MEDICATIONS  Scheduled Meds:amLODIPine, 5 mg, Oral, Daily  aspirin, 81 mg, Oral, Daily  atorvastatin, 80 mg, Oral, Nightly  carvedilol, 6.25 mg, Oral, BID With Meals  folic acid, 1 mg, Oral, Daily  hydrALAZINE, 100 mg, Oral, Q8H  hydroCHLOROthiazide Oral, 12.5 mg, Oral, Daily  lisinopril, 40 mg, Oral, Daily  melatonin, 10 mg, Oral, Nightly  multivitamin with minerals, 1 tablet, Oral, Daily  pantoprazole, 40 mg, Oral, Q AM  potassium chloride, 10 mEq, Oral, Daily  senna-docusate sodium, 2 tablet, Oral, BID  thiamine, 100 mg, Oral, Daily  ticagrelor, 60 mg, Oral, BID  vilazodone, 40 mg, Oral, Daily      Continuous Infusions:   PRN Meds:.  acetaminophen    senna-docusate sodium **AND** polyethylene glycol **AND** bisacodyl **AND** bisacodyl    hydrOXYzine    influenza vaccine      RESULTS  No results found for: \"POCGLU\"  Results from last 7 days   Lab Units 11/13/23  0831 11/09/23  0747   WBC 10*3/mm3 7.90 8.19   HEMOGLOBIN g/dL 13.9 13.2   HEMATOCRIT % 41.7 39.3   PLATELETS 10*3/mm3 356 278     Results from last 7 days   Lab Units 11/13/23  0831 11/09/23  0747 "   SODIUM mmol/L 144 144   POTASSIUM mmol/L 3.4* 3.5   CHLORIDE mmol/L 105 107   CO2 mmol/L 26.0 25.9   BUN mg/dL 12 12   CREATININE mg/dL 1.11 0.94   CALCIUM mg/dL 9.6 9.4   GLUCOSE mg/dL 127* 109*           ASSESSMENT and PLAN    Concussion    Status post concussion     History of CVA-history of 15 x 6 mm  infarct in the inferior medial  left cerebellum and a tiny 3 mm infarct in the posterior left cerebellum  aspirin/Brilinta/statin     Impaired mobility  Impaired self-care  Impaired cognition    Visual [id number] eye patch.  May possibly benefit from neuro optometry evaluation as an outpatient     Hypertension-amlodipine/carvedilol/hydralazine/lisinopril  Nov 9 - -162/91-97. Previously on spironlactone or hydrochlorothiazide. Will add hydrochlorothiazide 12.5 mg daily with KDUR 10 meq daily.  November 13-has some variability to his blood pressure range-follow pattern.     Hyperlipidemia     Gastroesophageal reflux disease     Depression-Viibryd     DVT prophylaxis-on dual antiplatelet therapy.  SCDs     GI bleed-evaluated by gastroenterology-Plan is to wait 3 months before he can come off of Brilinta to undergo possible colonoscopy.     TEAM CONF - NOV 9 - TRANSFERS CTG. 4 STAIRS CTG. GAIT 160 FEET RW CTG.   BATH MIN CTG. LBD MIN. UBD SBA. GROOMING SBA. TOILETING MIN. TOILET AND SHOWER TRANSFERS MIN.   Anxiety related to hx of strokes and fall . CONTINENT BOWEL AND BLADDER.   ATTENTION - Mildly impaired attention and visuospatial skills on CLQT.  Moderately impaired memory.  EXPRESSION - Mild anomia   ELOS - TWO WEEKS.        Goal is for home with home health   therapies.  Barrier to discharge:.  Mobility and self-care- work on strength balance transfers gait actives of daily living to overcome.          Pk Snow MD      Appropriate PPE was worn during the entire visit.  Hand hygiene was completed before and after.

## 2023-11-14 NOTE — THERAPY TREATMENT NOTE
Inpatient Rehabilitation - Occupational Therapy Treatment Note    [facility]     Patient Name: Flako Coreas  : 1965  MRN: [id number]    Today's Date: 2023                 Admit Date: 2023       No diagnosis found.    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    Acute CVA (cerebrovascular accident)    HTN (hypertension)    Late effect of cerebrovascular accident (CVA)    Concussion    Fall    Head injury, closed, with concussion    Alcohol abuse       Past Medical History:   Diagnosis Date    ADHD (attention deficit hyperactivity disorder)     On going issue    Anxiety     Lepe's esophagus     Benign prostatic hyperplasia Surgery in 2021    Clotting disorder Intestinal    Depression     Diverticulitis     Diverticulosis     Duodenitis     Enteritis     Failure to thrive (child)     Family history of blood clots     GERD (gastroesophageal reflux disease)     Hyperlipidemia     Hypertension     Hypertensive emergency     Low testosterone     Microcytosis     Pancreatitis     Pneumonia     PONV (postoperative nausea and vomiting)     Seasonal affective disorder     Shoulder injury     Stroke     Unexplained weight loss        Past Surgical History:   Procedure Laterality Date    COLON SURGERY      COLONOSCOPY      COLONOSCOPY N/A 2022    Procedure: COLONOSCOPY INTO CECUM WITH HOT SNARE POLYPECTOMY;  Surgeon: Albert Gallo MD;  Location: [facility] ENDOSCOPY;  Service: Gastroenterology;  Laterality: N/A;  PRE- GI BLEED  POST- DIVERTICULOSIS, POLYP    ENDOSCOPY N/A 12/10/2022    Procedure:  ESOPHAGOGASTRODUODENOSCOPY;  Surgeon: Albert Gallo MD;  Location: [facility] ENDOSCOPY;  Service: Gastroenterology;  Laterality: N/A;  PRE- DARK STOOLS  POST- BARRETTS ESOPHAGUS    FRACTURE SURGERY  1980    for broken arm    FRACTURE SURGERY      for broken hand    INCISION AND DRAINAGE ABSCESS  2015    anal    PROSTATE SURGERY      SMALL INTESTINE SURGERY      TONSILLECTOMY               IRF OT ASSESSMENT FLOWSHEET (last 12 hours)       IRF OT Evaluation and Treatment       Row Name 11/14/23 1521          OT Time and Intention    Document Type daily treatment  -     Mode of Treatment individual therapy;occupational therapy  -     Patient Effort good  -     Symptoms Noted During/After Treatment none  -       Row Name 11/14/23 1521          General Information    Patient/Family/Caregiver Comments/Observations pt in bed in am and pm prior to session  -     Existing Precautions/Restrictions fall  -       Row Name 11/14/23 1521          Pain Assessment    Pretreatment Pain Rating 0/10 - no pain  -     Posttreatment Pain Rating 0/10 - no pain  -       Row Name 11/14/23 1521          Cognition/Psychosocial    Affect/Mental Status (Cognition) WFL  -     Orientation Status (Cognition) oriented x 4  -     Follows Commands (Cognition) follows one-step commands;over 90% accuracy  -     Personal Safety Interventions fall prevention program maintained;gait belt;muscle strengthening facilitated;nonskid shoes/slippers when out of bed  -       Row Name 11/14/23 1521          Vision Assessment/Intervention    Visual Impairment/Limitations diplopia  -     Vision Assessment Comment wore eye patch in am during session after shower.  -       Row Name 11/14/23 1521          Bathing    [place] Level (Bathing) bathing skills;lower body;upper body;standby assist;set up  -     Assistive Device (Bathing) hand held shower spray hose;grab bar/tub rail;tub bench  -     Position (Bathing) supported  sitting;supported standing  -     Set-up Assistance (Bathing) obtain supplies;adjust water temperature  -       Row Name 11/14/23 1521          Upper Body Dressing    [place] Level (Upper Body Dressing) upper body dressing skills;doff;don;pull over garment;supervision;set up assistance  -     Position (Upper Body Dressing) supported sitting  -     Set-up Assistance (Upper Body Dressing) obtain clothing  -       Row Name 11/14/23 1521          Lower Body Dressing    [place] Level (Lower Body Dressing) doff;don;pants/bottoms;shoes/slippers;underwear;socks;set up;standby assist;contact guard assist  -     Position (Lower Body Dressing) supported sitting;supported standing  \[facility]\""     Set-up Assistance (Lower Body Dressing) obtain clothing  -[facility] Name 11/14/23 1521          Grooming    [place] Level (Grooming) grooming skills;deodorant application;oral care regimen;wash face, hands;set up;standby assist  -     Position (Grooming) sink side;supported standing  -[facility] Name 11/14/23 1521          Toileting    Comment (Toileting) did not need to use BR in am or pm session  -[facility] Name 11/14/23 1521          Bed Mobility    Supine-Sit [place] (Bed Mobility) standby assist  -     Sit-Supine [place] (Bed Mobility) standby assist  -[facility] Name 11/14/23 1521          Functional Mobility    Functional Mobility- Ind. Level set up required;contact guard assist  \[facility]\""     Functional Mobility- Device walker, front-wheeled  -     Functional Mobility- Comment in room, to BR, to wc,/to bed  -       Row Name 11/14/23 1521          Sit-Stand Transfer    Sit-Stand [place] (Transfers) set up;standby assist;contact guard  -     Assistive Device (Sit-Stand Transfers) wheelchair  -       Row Name 11/14/23 1521          Stand-Sit Transfer    Stand-Sit [place] (Transfers) standby assist;set up;contact guard  -     Assistive Device (Stand-Sit Transfers) wheelchair   -KP       Row Name 11/14/23 1521          Shower Transfer    Type (Shower Transfer) stand-sit;sit-stand;stand pivot/stand step  -KP     [place] Level (Shower Transfer) set up;contact guard  -KP     Assistive Device (Shower Transfer) walker, front-wheeled;grab bar, tub/shower;tub bench  -KP       Row Name 11/14/23 1521          Elbow/Forearm (Therapeutic Exercise)    Elbow/Forearm Strengthening (Therapeutic Exercise) left;right;flexion;extension;supination;pronation;sitting;10 repetitions;3 sets  6# hand wt am and pm pt wanted to complete some strength ex  -KP       Row Name 11/14/23 1521          Wrist (Therapeutic Exercise)    Wrist Strengthening (Therapeutic Exercise) right;left;flexion;extension;other (see comments);2 sets;10 repetitions  6# wt pt wanted to complete some UE ex in am and pm  -KP       Row Name 11/14/23 1521          Balance    Static Sitting Balance independent  -KP     Dynamic Sitting Balance standby assist  -KP     Position, Sitting Balance sitting in chair  -KP     Static Standing Balance standby assist  -KP     Dynamic Standing Balance contact guard;standby assist  mostly CGA but occasional SBA  -KP     Comment, Balance standing at counter top complete reaching tasks across midline w R and L UE to incr dyn standing balance. pt completed cones onto eugenio, pegs into board, round pegs/sq pegs, close pins task onto dowel eugenio.w no LOB but req CGA to close SBA  -KP       Row Name 11/14/23 1521          Positioning and Restraints    Pre-Treatment Position in bed  -KP     Post Treatment Position bed  -KP     In Bed fowlers;encouraged to call for assist;call light within reach;exit alarm on  am  -KP     In Wheelchair sitting;exit alarm on;with SLP  pm  -KP       Row Name 11/14/23 1521          Daily Progress Summary (OT)    Overall Progress Toward Functional Goals (OT) progressing toward functional goals as expected  -               User Key  (r) = Recorded By, (t) = Taken By, (c) = Cosigned By       Initials Name Effective Dates    ANTOINETTE Divya Giron Oneal, OTR 07/11/23 -                      Occupational Therapy Education       Title: PT OT SLP Therapies (Done)       Topic: Occupational Therapy (Done)       Point: ADL training (Done)       Description:   Instruct learner(s) on proper safety adaptation and remediation techniques during self care or transfers.   Instruct in proper use of assistive devices.                  Learning Progress Summary             Patient Acceptance, E, VU by  at 11/7/2023 1649    Comment: Explanation of OT services, evaluation results and goal setting                         Point: Home exercise program (Done)       Description:   Instruct learner(s) on appropriate technique for monitoring, assisting and/or progressing therapeutic exercises/activities.                  Learning Progress Summary             Patient Acceptance, E, VU by  at 11/7/2023 1649    Comment: Explanation of OT services, evaluation results and goal setting                         Point: Precautions (Done)       Description:   Instruct learner(s) on prescribed precautions during self-care and functional transfers.                  Learning Progress Summary             Patient Acceptance, E, VU by CC at 11/7/2023 1649    Comment: Explanation of OT services, evaluation results and goal setting                         Point: Body mechanics (Done)       Description:   Instruct learner(s) on proper positioning and spine alignment during self-care, functional mobility activities and/or exercises.                  Learning Progress Summary             Patient Acceptance, E, VU by CC at 11/7/2023 1649    Comment: Explanation of OT services, evaluation results and goal setting                                         User Key       Initials Effective Dates Name Provider Type Discipline     06/16/21 -  Rosa Chris OTR Occupational Therapist OT                        OT Recommendation and Plan             Daily Progress Summary (OT)  Overall Progress Toward Functional Goals (OT): progressing toward functional goals as expected            Time Calculation:      Time Calculation- OT       Row Name 11/14/23 1528 11/14/23 0800          Time Calculation- OT    OT Start Time 1230  -KP 0800  -KP     OT Stop Time 1300  -KP 0830  -KP     OT Time Calculation (min) 30 min  -KP 30 min  -               User Key  (r) = Recorded By, (t) = Taken By, (c) = Cosigned By      Initials Name Provider Type     Divya Giron OTR Occupational Therapist                  Therapy Charges for Today       Code Description Service Date Service Provider Modifiers Qty    92160715264 HC OT NEUROMUSC RE EDUCATION EA 15 MIN 11/14/2023 Divya Giron OTR GO 1    02376464006 HC OT SELF CARE/MGMT/TRAIN EA 15 MIN 11/14/2023 Divya Giron OTR GO 1    74070593924 HC OT NEUROMUSC RE EDUCATION EA 15 MIN 11/14/2023 Divya Giron OTR GO 2                     BEATA Tijerina  11/14/2023

## 2023-11-14 NOTE — PROGRESS NOTES
Recreational Therapy Note    Patient Name: Flako Coreas   MRN: [id number]    Therapeutic Recreation Eval and Treat (last 12 hours)       Therapeutic Recreation Eval & Treat       Row Name 11/14/23 1400       Therapeutic Recreation Participation    Recreation Therapy Participation games  -TW    Games card games  [place]  -TW    Objectives of Recreation Participation increase;sense of autonomy by choosing level of participation;positive attitudes leading to a healthy leisure lifestyle  -TW    Comment, Recreation Participation BUE use to hold/place cards; occ cues for directions; stated double vision during session but able to complete activity  -TW    Recreation Therapy Summary of Participation active participation  -TW              User Key  (r) = Recorded By, (t) = Taken By, (c) = Cosigned By      Initials Name Provider Type    Olya Rodriguez, POOJA Recreational Therapist                      POOJA Rice  11/14/2023

## 2023-11-14 NOTE — PLAN OF CARE
Goal Outcome Evaluation:  Plan of Care Reviewed With: patient           Outcome Evaluation: Pt alert and oriented x4, but anxious at times-PRN Atarax once this afternoon.He is assist x1 with unsteady gait, takes medication with water, and continent of b/b, He has double vision in both eyes- eye patch ordered- see orders. Tingling to left face, tongue, and hand, no unsafe behavior, and no complaints of pain. He tolerated all therapies, and VS stable, see nursing note regarding followup appointment. Hydralazine not given, lab Mon and thurs

## 2023-11-14 NOTE — TELEPHONE ENCOUNTER
Called pt to reschedule due to provider being out of office pt became very upset. I tried to rescheduled pt however he became even more upset and just hung up. I cancelled apt however was unable to reschedule due to him hanging up.

## 2023-11-14 NOTE — PROGRESS NOTES
Inpatient Rehabilitation Plan of Care Note    Plan of Care  Care Plan Reviewed - No updates at this time.    Safety    [RN] Potential for Injury(Active)  Current Status(11/14/2023): Uses call light appropriately  Weekly Goal(11/20/2023): Cues to use call light  Discharge Goal: Items within reach    Performed Intervention(s)  Items within reach  Safety rounds  Bed alarm/chair alarm      Psychosocial    [RN] Behavior(Active)  Current Status(11/14/2023): Anxiety related to hx of strokes and fall  Weekly Goal(11/16/2023): Allow opportunity to express concers regarding current  status.  Discharge Goal: Demonstrate healthy oping strategies.    Performed Intervention(s)  Medication  verbalize needs and concerns      Sphincter Control    [RN] Bowel Management(Active)  Current Status(11/14/2023): continent 100%  Weekly Goal(11/24/2023): continet 100%  Discharge Goal: continent 100%    [RN] Bladder Management(Active)  Current Status(11/14/2023): continent 100%  Weekly Goal(11/23/2023): Continent 100%  Discharge Goal: Continent 100%    Performed Intervention(s)  Therapeutic exercises/modalities  Elimination schedule  Encourage appropriate diet    Signed by: Anu Sotelo RN

## 2023-11-14 NOTE — PLAN OF CARE
Goal Outcome Evaluation:  Plan of Care Reviewed With: patient             Problem: Rehabilitation (IRF) Plan of Care  Goal: Plan of Care Review  Outcome: Ongoing, Progressing  Flowsheets (Taken 11/14/2023 0311)  Plan of Care Reviewed With: patient  Outcome Evaluation:  Pt is alert and oriented x 4. Calm and cooperative. Anxious at times; pt requested PRN Atarax at HS. Meds whole PO with thins. Continent of bowel and bladder. Urinal at bedside. Pt is able to turn and reposition self while in bed. Double vision noted. Wears eye patch and alternates every 2 hours. Numbness and tingling to left side of face and LUE. Hydralazine held r/t low readings. Denies pain. No unsafe behaviors. Call light within reach.

## 2023-11-15 PROCEDURE — 97130 THER IVNTJ EA ADDL 15 MIN: CPT

## 2023-11-15 PROCEDURE — 97112 NEUROMUSCULAR REEDUCATION: CPT

## 2023-11-15 PROCEDURE — 97116 GAIT TRAINING THERAPY: CPT

## 2023-11-15 PROCEDURE — 97535 SELF CARE MNGMENT TRAINING: CPT

## 2023-11-15 PROCEDURE — 97129 THER IVNTJ 1ST 15 MIN: CPT

## 2023-11-15 RX ORDER — SPIRONOLACTONE 25 MG/1
25 TABLET ORAL DAILY
Status: DISCONTINUED | OUTPATIENT
Start: 2023-11-15 | End: 2023-11-17 | Stop reason: HOSPADM

## 2023-11-15 RX ADMIN — HYDRALAZINE HYDROCHLORIDE 100 MG: 50 TABLET, FILM COATED ORAL at 20:27

## 2023-11-15 RX ADMIN — DOCUSATE SODIUM 50MG AND SENNOSIDES 8.6MG 2 TABLET: 8.6; 5 TABLET, FILM COATED ORAL at 07:48

## 2023-11-15 RX ADMIN — Medication 100 MG: at 07:48

## 2023-11-15 RX ADMIN — CARVEDILOL 6.25 MG: 6.25 TABLET, FILM COATED ORAL at 07:48

## 2023-11-15 RX ADMIN — ATORVASTATIN CALCIUM 80 MG: 80 TABLET, FILM COATED ORAL at 20:27

## 2023-11-15 RX ADMIN — HYDROXYZINE HYDROCHLORIDE 50 MG: 25 TABLET ORAL at 17:03

## 2023-11-15 RX ADMIN — PANTOPRAZOLE SODIUM 40 MG: 40 TABLET, DELAYED RELEASE ORAL at 05:33

## 2023-11-15 RX ADMIN — HYDRALAZINE HYDROCHLORIDE 100 MG: 50 TABLET, FILM COATED ORAL at 14:42

## 2023-11-15 RX ADMIN — MULTIPLE VITAMINS W/ MINERALS TAB 1 TABLET: TAB at 07:48

## 2023-11-15 RX ADMIN — ASPIRIN 81 MG: 81 TABLET, COATED ORAL at 07:48

## 2023-11-15 RX ADMIN — Medication 10 MG: at 20:27

## 2023-11-15 RX ADMIN — VILAZODONE HYDROCHLORIDE 40 MG: 40 TABLET, FILM COATED ORAL at 07:47

## 2023-11-15 RX ADMIN — POTASSIUM CHLORIDE 10 MEQ: 750 TABLET, EXTENDED RELEASE ORAL at 07:48

## 2023-11-15 RX ADMIN — HYDROCHLOROTHIAZIDE 12.5 MG: 12.5 TABLET ORAL at 07:48

## 2023-11-15 RX ADMIN — AMLODIPINE BESYLATE 5 MG: 5 TABLET ORAL at 07:47

## 2023-11-15 RX ADMIN — SPIRONOLACTONE 25 MG: 25 TABLET, FILM COATED ORAL at 17:44

## 2023-11-15 RX ADMIN — HYDROXYZINE HYDROCHLORIDE 50 MG: 25 TABLET ORAL at 07:47

## 2023-11-15 RX ADMIN — TICAGRELOR 60 MG: 60 TABLET ORAL at 07:49

## 2023-11-15 RX ADMIN — DOCUSATE SODIUM 50MG AND SENNOSIDES 8.6MG 2 TABLET: 8.6; 5 TABLET, FILM COATED ORAL at 20:27

## 2023-11-15 RX ADMIN — TICAGRELOR 60 MG: 60 TABLET ORAL at 20:27

## 2023-11-15 RX ADMIN — CARVEDILOL 6.25 MG: 6.25 TABLET, FILM COATED ORAL at 17:03

## 2023-11-15 RX ADMIN — LISINOPRIL 40 MG: 40 TABLET ORAL at 07:47

## 2023-11-15 RX ADMIN — FOLIC ACID 1 MG: 1 TABLET ORAL at 07:48

## 2023-11-15 NOTE — PROGRESS NOTES
" LOS: 9 days   Patient Care Team:  Akash Rodriguez Jr., DO as PCP - General (Sports Medicine)      NIMESH ROCHA  1965      ADMITTING DIAGNOSIS:    Status post concussion  History of CVA-aspirin/Brilinta/statin    Subjective     No new problems reported  Balance improving  BNE today    Objective     Vitals:    11/15/23 1442   BP: (!) 167/105   Pulse: 64   Resp:    Temp:    SpO2:        Intake/Output Summary (Last 24 hours) at 11/15/2023 1443  Last data filed at 11/15/2023 1204  Gross per 24 hour   Intake 360 ml   Output 575 ml   Net -215 ml     PHYSICAL EXAM:    MENTAL STATUS -  AWAKE / ALERT  HEENT-      LUNGS - normal resp  HEART- RRR   ABD - Nondistended   EXT - NO EDEMA OR CYANOSIS  NEURO -oriented x4     MOTOR EXAM - RUE/RLE 5/5. LUE/LLE 5/5.        MEDICATIONS  Scheduled Meds:amLODIPine, 5 mg, Oral, Daily  aspirin, 81 mg, Oral, Daily  atorvastatin, 80 mg, Oral, Nightly  carvedilol, 6.25 mg, Oral, BID With Meals  folic acid, 1 mg, Oral, Daily  hydrALAZINE, 100 mg, Oral, Q8H  hydroCHLOROthiazide Oral, 12.5 mg, Oral, Daily  lisinopril, 40 mg, Oral, Daily  melatonin, 10 mg, Oral, Nightly  multivitamin with minerals, 1 tablet, Oral, Daily  pantoprazole, 40 mg, Oral, Q AM  potassium chloride, 10 mEq, Oral, Daily  senna-docusate sodium, 2 tablet, Oral, BID  thiamine, 100 mg, Oral, Daily  ticagrelor, 60 mg, Oral, BID  vilazodone, 40 mg, Oral, Daily      Continuous Infusions:   PRN Meds:.  acetaminophen    senna-docusate sodium **AND** polyethylene glycol **AND** bisacodyl **AND** bisacodyl    hydrOXYzine    influenza vaccine      RESULTS  No results found for: \"POCGLU\"  Results from last 7 days   Lab Units 11/13/23  0831 11/09/23  0747   WBC 10*3/mm3 7.90 8.19   HEMOGLOBIN g/dL 13.9 13.2   HEMATOCRIT % 41.7 39.3   PLATELETS 10*3/mm3 356 278     Results from last 7 days   Lab Units 11/13/23  0831 11/09/23  0747   SODIUM mmol/L 144 144   POTASSIUM mmol/L 3.4* 3.5   CHLORIDE mmol/L 105 107   CO2 mmol/L " 26.0 25.9   BUN mg/dL 12 12   CREATININE mg/dL 1.11 0.94   CALCIUM mg/dL 9.6 9.4   GLUCOSE mg/dL 127* 109*           ASSESSMENT and PLAN    Concussion    Status post concussion     History of CVA-history of 15 x 6 mm  infarct in the inferior medial  left cerebellum and a tiny 3 mm infarct in the posterior left cerebellum  aspirin/Brilinta/statin     Impaired mobility  Impaired self-care  Impaired cognition    Visual zlzthdxwpt-znmqgram-xhxqwzxil eye patch.  May possibly benefit from neuro optometry evaluation as an outpatient     Hypertension-amlodipine/carvedilol/hydralazine/lisinopril  Nov 9 - -162/91-97. Previously on spironlactone or hydrochlorothiazide. Will add hydrochlorothiazide 12.5 mg daily with KDUR 10 meq daily.  November 13-has some variability to his blood pressure range-follow pattern.  November 15-systolic blood pressure in the 150s on multiple medications-  will get input from cardiology service      Hyperlipidemia     Gastroesophageal reflux disease     Depression-Viibryd     DVT prophylaxis-on dual antiplatelet therapy.  SCDs     GI bleed-evaluated by gastroenterology-Plan is to wait 3 months before he can come off of Brilinta to undergo possible colonoscopy.     TEAM CONF - NOV 9 - TRANSFERS CTG. 4 STAIRS CTG. GAIT 160 FEET RW CTG.   BATH MIN CTG. LBD MIN. UBD SBA. GROOMING SBA. TOILETING MIN. TOILET AND SHOWER TRANSFERS MIN.   Anxiety related to hx of strokes and fall . CONTINENT BOWEL AND BLADDER.   ATTENTION - Mildly impaired attention and visuospatial skills on CLQT.  Moderately impaired memory.  EXPRESSION - Mild anomia   ELOS - TWO WEEKS.     BNE (Active)-November 15, 2023  Att'n. - Mildly imp.  Exec. fx. - Mildly Imp., appropriate insight  Rsng/Jgmnt - WNL  Visuospatial Skills - WNL  Visual Mem. - Mildly Imp.  Verbal Mem. - Mod. Imp. for leraning, Mildly Imp. for delayed recall  Emot - Pt acknowledged both dep and anxiety, mostly related to concern about  functional status. Pt denied  suicidal ideatio       Goal is for home with home health   therapies.  Barrier to discharge:.  Mobility and self-care- work on strength balance transfers gait actives of daily living to overcome.          Pk Snow MD      Appropriate PPE was worn during the entire visit.  Hand hygiene was completed before and after.

## 2023-11-15 NOTE — PLAN OF CARE
Goal Outcome Evaluation:  Plan of Care Reviewed With: patient        Progress: improving  Outcome Evaluation: Pt is A&OX4, calm and cooperative. Meds whole with water. Assist X1 to wc. Numbness noted on L side. Con of B&B uses urinal, last reported BM 11/15. BP elevated today, cardiology consulted. No c/o pain thus far. Pt participated with all therapies today.

## 2023-11-15 NOTE — PROGRESS NOTES
Inpatient Rehabilitation Functional Measures Assessment and Plan of Care    Plan of Care  Updated Problems/Interventions  Self Care    [OT] Bathing(Active)  Current Status(11/15/2023): SBA  Weekly Goal(11/20/2023): SBA/Mod I  Discharge Goal: SBA/Mod I    [OT] Dressing (Lower)(Active)  Current Status(11/15/2023): SBA  Weekly Goal(11/21/2023): SBA/Mod I  Discharge Goal: SBa/Mod I    [OT] Dressing (Upper)(Active)  Current Status(11/15/2023): set up  Weekly Goal(11/20/2023): set up/Mod I  Discharge Goal: set up/Mod I    [OT] Eating(Active)  Current Status(11/13/2023): I  Weekly Goal(11/13/2023): I  Discharge Goal: I    [OT] Grooming(Active)  Current Status(11/13/2023): set up  Weekly Goal(11/20/2023): set up/Mod I  Discharge Goal: Set up/Mod I    [OT] Toileting(Active)  Current Status(11/15/2023): SBA  Weekly Goal(11/21/2023): MOD I  Discharge Goal: MOd I        Mobility    [OT] Toilet Transfers(Active)  Current Status(11/15/2023): SBA w vc  Weekly Goal(11/20/2023): SBA/MOD I  Discharge Goal: SBA/Mod I    [OT] Tub/Shower Transfers(Active)  Current Status(11/15/2023): CGA?SBA  Weekly Goal(11/21/2023): SBA  Discharge Goal: SBA    Functional Measures  CAROL Eating:  Branch  CAROL Grooming: Branch  CAROL Bathing:  Branch  CAROL Upper Body Dressing:  Branch  CAROL Lower Body Dressing:  Branch  CAROL Toileting:  Branch    CAROL Bladder Management  Level of Assistance:  Branch  Frequency/Number of Accidents this Shift:  Branch    CAROL Bowel Management  Level of Assistance: Branch  Frequency/Number of Accidents this Shift: Branch    CAROL Bed/Chair/Wheelchair Transfer:  Branch  CAROL Toilet Transfer:  Branch  CAROL Tub/Shower Transfer:  Branch    Previously Documented Mode of Locomotion at Discharge: Field  CAROL Expected Mode of Locomotion at Discharge: Branch  CAROL Walk/Wheelchair:  Branch  CAROL Stairs:  Branch    CAROL Comprehension:  Branch  CAROL Expression:  Branch  CAROL Social Interaction:  Branch  CAROL Problem Solving:  Branch  CAROL Memory:   Branch    Therapy Mode Minutes  Occupational Therapy: Branch  Physical Therapy: Branch  Speech Language Pathology:  Branch    Signed by: BEATA Mejia/SAMMY

## 2023-11-15 NOTE — PROGRESS NOTES
Inpatient Rehabilitation Plan of Care Note    Plan of Care  Updated Problems/Interventions  Medical Problem(s)    BNE (Active)  Att'n. - Mildly imp.  Exec. fx. - Mildly Imp., appropriate insight  Rsng/Jgmnt - WNL  Visuospatial Skills - WNL  Visual Mem. - Mildly Imp.  Verbal Mem. - Mod. Imp. for leraning, Mildly Imp. for delayed recall  Emot - Pt acknowledged both dep and anxiety, mostly related to concern about  functional status. Pt denied suicidal ideation.    Signed by: Ricky Aleman Psy.d

## 2023-11-15 NOTE — PROGRESS NOTES
Inpatient Rehabilitation Plan of Care Note    Plan of Care  Care Plan Reviewed - No updates at this time.    Safety    Performed Intervention(s)  Items within reach  Safety rounds  Bed alarm/chair alarm      Psychosocial    Performed Intervention(s)  Medication  verbalize needs and concerns      Sphincter Control    Performed Intervention(s)  Therapeutic exercises/modalities  Elimination schedule  Encourage appropriate diet    Signed by: Nohemi Gonzales RN

## 2023-11-15 NOTE — PROGRESS NOTES
"Nutrition Services    Patient Name:  Flako Coreas  YOB: 1965  MRN: [id number]  Admit Date:  11/6/2023    Assessment Date:  11/15/23    Summary: follow up  Tolerating current diet, Healthy Heart (reg/thins). Appetite is good, intake % most meals. No new labs; skin status reviewed (intact).     Plan/Recommend:  Encourage intake at meals.   Monitor intake, labs, weight and skin status.   RD to follow.      CLINICAL NUTRITION ASSESSMENT      Reason for Assessment Follow-up Protocol     Admitting Diagnosis   Concussion             Current Nutrition Orders & Evaluation of Intake       Oral Nutrition      Food Allergies NKFA    Current PO Diet Diet: Cardiac Diets; Healthy Heart (2-3 Na+); Texture: Regular Texture (IDDSI 7); Fluid Consistency: Thin (IDDSI 0)    Supplement N/a   PO Evaluation      PO Intake %      Factors Affecting Intake No factors at this time   --  Anthropometrics        Current Height  Current Weight (CBW)  BMI kg/m2 Height: 188 cm (74\")  Weight: 105 kg (232 lb 8 oz) (11/06/23 1505)  Body mass index is 29.85 kg/m².   BMI Category Overweight (25 - 29.9)   Ideal Weight (IBW) 190#   Usual Weight (UBW) 225#   Weight Trend Gain   Weight History Wt Readings from Last 15 Encounters:   11/06/23 1505 105 kg (232 lb 8 oz)   10/26/23 2207 105 kg (231 lb)   10/26/23 2012 111 kg (245 lb)   10/26/23 2235 105 kg (231 lb)   10/23/23 2152 111 kg (245 lb 9.5 oz)   10/23/23 1906 113 kg (249 lb 3.2 oz)   10/23/23 2036 113 kg (249 lb)   09/18/23 1353 103 kg (227 lb)   09/08/23 1506 104 kg (229 lb 12.8 oz)   09/02/23 1427 102 kg (225 lb)   09/02/23 1113 102 kg (225 lb)   08/19/23 2000 102 kg (225 lb)   08/19/23 0515 102 kg (224 lb 10.4 oz)   08/17/23 1933 107 kg (236 lb 1.8 oz)   08/17/23 1021 105 kg (232 lb)   08/16/23 1650 105 kg (232 lb)   08/16/23 1455 102 kg (225 lb)   06/19/23 0904 102 kg (225 lb)   12/28/22 1455 102 kg (225 lb)   12/16/22 1027 102 kg (224 lb)   12/08/22 1643 102 kg (224 " lb 8 oz)   12/08/22 0436 102 kg (225 lb)   12/06/22 0947 99.8 kg (220 lb)      --  Physical Findings          General Appearance alert   Oral/Mouth Cavity WDL   Edema  no edema   Gastrointestinal last bowel movement: 11/14   Skin  skin intact   Tubes/Drains/Lines none   NFPE Not indicated at this time       Medications & Labs           Scheduled Medications amLODIPine, 5 mg, Oral, Daily  aspirin, 81 mg, Oral, Daily  atorvastatin, 80 mg, Oral, Nightly  carvedilol, 6.25 mg, Oral, BID With Meals  folic acid, 1 mg, Oral, Daily  hydrALAZINE, 100 mg, Oral, Q8H  hydroCHLOROthiazide Oral, 12.5 mg, Oral, Daily  lisinopril, 40 mg, Oral, Daily  melatonin, 10 mg, Oral, Nightly  multivitamin with minerals, 1 tablet, Oral, Daily  pantoprazole, 40 mg, Oral, Q AM  potassium chloride, 10 mEq, Oral, Daily  senna-docusate sodium, 2 tablet, Oral, BID  thiamine, 100 mg, Oral, Daily  ticagrelor, 60 mg, Oral, BID  vilazodone, 40 mg, Oral, Daily       Infusions     PRN Medications   acetaminophen    senna-docusate sodium **AND** polyethylene glycol **AND** bisacodyl **AND** bisacodyl    hydrOXYzine    influenza vaccine            Pertinent Labs   Results from last 7 days   Lab Units 11/13/23  0831 11/09/23  0747   SODIUM mmol/L 144 144   POTASSIUM mmol/L 3.4* 3.5   CHLORIDE mmol/L 105 107   CO2 mmol/L 26.0 25.9   BUN mg/dL 12 12   CREATININE mg/dL 1.11 0.94   CALCIUM mg/dL 9.6 9.4   GLUCOSE mg/dL 127* 109*     Results from last 7 days   Lab Units 11/13/23  0831   HEMOGLOBIN g/dL 13.9   HEMATOCRIT % 41.7   WBC 10*3/mm3 7.90     Results from last 7 days   Lab Units 11/13/23  0831 11/09/23  0747   PLATELETS 10*3/mm3 356 278     Lab Results   Component Value Date    HGBA1C 5.70 (H) 10/24/2023        Nutrition Diagnosis        Nutrition Dx Problem  Problem: Nutrition Appropriate for Condition at this Time  Etiology: MNT for Treatment/Condition   Signs/Symptoms: PO intake       NUTRITION INTERVENTION / PLAN OF CARE  Goals        Intervention  Goal(s) Maintain intake     Nutrition Intervention        RD Action Continue to monitor     Prescription        Diet Prescription    Supplement Prescription    Snack Prescription    EN Prescription    New Prescription Ordered? No changes at this time     Monitor/Evaluation        Monitor Per protocol   Discharge Needs Pending clinical course     RD to follow up per protocol.     Electronically signed by:  Nida Mcrae RD  11/15/23 12:56 EST

## 2023-11-15 NOTE — CONSULTS
Patient Name: Flako Coreas  :1965  58 y.o.    Date of Admission: 2023  Date of Consultation:  11/15/23  Encounter Provider: JANEEN Lara  Place of Service: [facility] CARDIOLOGY  Referring Provider: Pk Snow MD  Patient Care Team:  Akash Rodriguez Jr., DO as PCP - General (Sports Medicine)      Chief complaint: elevated BP    History of Present Illness:  Mr. Coreas is a 58 year old gentleman who was admitted for acute cerebellar stroke in August. He had hypertensive urgency. He was discharged on amlodipine, carvedilol, hydralazine, HCTZ and spironolactone. Per his report, BP was very well controlled on this regimen at home with BP averaging 130/70's.     He followed up in our office  on  . BP was well controlled . He wore a two week monitor that showed no evidence of AF.     He came back to the emergency room on 10/23 with left sided numbness and tingling. He had dizziness and ataxia. No new stroke on imaging. He was a non responder to plavix so he was changed to brilinta and discharged on 10/25.    Unfortunately he came right back to the ER the next day. He was admitted and ultimately transferred to [facility] acute rehab. He had mild ALLISON on admission with creatinine 1.46 that resolved the next day. Renal function has remained stable. It looks like spironolactone and HCTZ were stopped at that time and never restarted.     He has done well in acute rehab. BP has been on the high side for which we have been asked to see.     MEGHANN 2023    Left ventricular systolic function is hyperdynamic (EF > 70%).    Left ventricular wall thickness is consistent with mild to moderate concentric hypertrophy.    Left ventricular diastolic function is consistent with (grade I) impaired relaxation.    Normal right ventricular cavity size and systolic function noted.    The left atrial cavity is mildly dilated.    No evidence of a left atrial appendage  thrombus was present    No evidence of a patent foramen ovale. Saline test results are negative.    There are very mild plaques in the distal descending thoracic aorta. There are no plaques in the aortic arch or ascending aorta    There is no evidence of pericardial effusion      Past Medical History:   Diagnosis Date    ADHD (attention deficit hyperactivity disorder)     On going issue    Anxiety     Lepe's esophagus     Benign prostatic hyperplasia Surgery in 12/2021    Clotting disorder Intestinal    Depression     Diverticulitis     Diverticulosis     Duodenitis     Enteritis     Failure to thrive (child)     Family history of blood clots     GERD (gastroesophageal reflux disease)     Hyperlipidemia     Hypertension     Hypertensive emergency     Low testosterone     Microcytosis     Pancreatitis     Pneumonia     PONV (postoperative nausea and vomiting)     Seasonal affective disorder     Shoulder injury     Stroke     Unexplained weight loss        Past Surgical History:   Procedure Laterality Date    COLON SURGERY      COLONOSCOPY      COLONOSCOPY N/A 12/11/2022    Procedure: COLONOSCOPY INTO CECUM WITH HOT SNARE POLYPECTOMY;  Surgeon: Albert Gallo MD;  Location: [facility] ENDOSCOPY;  Service: Gastroenterology;  Laterality: N/A;  PRE- GI BLEED  POST- DIVERTICULOSIS, POLYP    ENDOSCOPY N/A 12/10/2022    Procedure: ESOPHAGOGASTRODUODENOSCOPY;  Surgeon: Albert Gallo MD;  Location: [facility] ENDOSCOPY;  Service: Gastroenterology;  Laterality: N/A;  PRE- DARK STOOLS  POST- BARRETTS ESOPHAGUS    FRACTURE SURGERY  1980    for broken arm    FRACTURE SURGERY      for broken hand    INCISION AND DRAINAGE ABSCESS  2015    anal    PROSTATE SURGERY      SMALL INTESTINE SURGERY      TONSILLECTOMY           Prior to Admission medications    Medication Sig Start Date End Date Taking? Authorizing Provider   amLODIPine (NORVASC) 5 MG tablet Take 1 tablet by mouth Daily.   Yes Provider, MD Ovidio   aspirin 81 MG EC  tablet Take 1 tablet by mouth Daily. 9/18/23  Yes Akash Rodriguez Jr., DO   atorvastatin (LIPITOR) 80 MG tablet Take 1 tablet by mouth Every Night. 10/25/23  Yes Edelmira Trujillo MD   carvedilol (COREG) 6.25 MG tablet Take 1 tablet by mouth 2 (Two) Times a Day With Meals. 9/18/23  Yes Akash Rodriguez Jr., DO   hydrALAZINE (APRESOLINE) 100 MG tablet Take 1 tablet by mouth Every 8 (Eight) Hours. 9/18/23  Yes Akash Rodriguez Jr., DO   hydrOXYzine (ATARAX) 25 MG tablet Take 1 tablet by mouth 3 (Three) Times a Day As Needed for Anxiety. 9/18/23  Yes Akash Rodriguez Jr., DO   lisinopril (PRINIVIL,ZESTRIL) 40 MG tablet Take 1 tablet by mouth Daily. 9/18/23  Yes Akash Rodriguez Jr., DO   spironolactone (ALDACTONE) 25 MG tablet Take 1 tablet by mouth Daily. 9/18/23  Yes Akash Rodriguez Jr., DO   ticagrelor (BRILINTA) 60 MG tablet tablet Take 1 tablet by mouth 2 (Two) Times a Day for 30 days. 10/25/23 11/24/23 Yes Edelmira Trujillo MD   vilazodone (Viibryd) 40 MG tablet tablet Take 1 tablet by mouth Daily. 9/18/23  Yes Akash Rodriguez Jr., DO       No Known Allergies    Social History     Socioeconomic History    Marital status: Single   Tobacco Use    Smoking status: Never    Smokeless tobacco: Never   Vaping Use    Vaping Use: Never used   Substance and Sexual Activity    Alcohol use: Not Currently     Comment: 12 beers on the weekend    Drug use: Not Currently     Frequency: 1.0 times per week     Types: Marijuana     Comment: patient states he has been smoking marijuania daily x 2 weeks    Sexual activity: Defer     Partners: Female       Family History   Problem Relation Age of Onset    Stroke Mother     Stroke Father        REVIEW OF SYSTEMS:   All systems reviewed.  Pertinent positives identified in HPI.  All other systems are negative.      Objective:     Vitals:    11/15/23 0747 11/15/23 1300 11/15/23 1442 11/15/23 1443   BP: 153/98 156/99 (!) 167/105  151/92   BP Location:  Right arm Right arm Left arm   Patient Position:  Lying Lying Lying   Pulse: 64 63 64 63   Resp:  18     Temp:  99.1 °F (37.3 °C)     TempSrc:  Oral     SpO2:  97%     Weight:       Height:         Body mass index is 29.85 kg/m².    Physical Exam:  Constitutional: She is oriented to person, place, and time. She appears well-developed. She does not appear ill.   HENT:   Head: Normocephalic and atraumatic. Head is without contusion.   Right Ear: Hearing normal. No drainage.   Left Ear: Hearing normal. No drainage.   Nose: No nasal deformity. No epistaxis.   Eyes: Lids are normal. Right eye exhibits no exudate. Left eye exhibits no exudate.  Neck: No JVD present.   Cardiovascular: Normal rate, regular rhythm and normal heart sounds.    Pulses:       Posterior tibial pulses are 2+ on the right side, and 2+ on the left side.   Pulmonary/Chest: Effort normal and breath sounds normal.   Abdominal: Soft. Normal appearance and bowel sounds are normal. There is no tenderness.   Musculoskeletal: Normal range of motion.        Right shoulder: She exhibits no deformity.        Left shoulder: She exhibits no deformity.   Neurological: She is alert and oriented to person, place, and time. She has normal strength.   Skin: Skin is warm, dry and intact. No rash noted.   Psychiatric: She has a normal mood and affect. Her behavior is normal. Thought content normal.   Vitals reviewed      Lab Review:     Results from last 7 days   Lab Units 11/13/23  0831   SODIUM mmol/L 144   POTASSIUM mmol/L 3.4*   CHLORIDE mmol/L 105   CO2 mmol/L 26.0   BUN mg/dL 12   CREATININE mg/dL 1.11   CALCIUM mg/dL 9.6   GLUCOSE mg/dL 127*         Results from last 7 days   Lab Units 11/13/23  0831   WBC 10*3/mm3 7.90   HEMOGLOBIN g/dL 13.9   HEMATOCRIT % 41.7   PLATELETS 10*3/mm3 356           Current Facility-Administered Medications:     acetaminophen (TYLENOL) tablet 650 mg, 650 mg, Oral, Q6H PRN, Pk Snow MD     amLODIPine (NORVASC) tablet 5 mg, 5 mg, Oral, Daily, Pk Snow MD, 5 mg at 11/15/23 0747    aspirin EC tablet 81 mg, 81 mg, Oral, Daily, Pk Snow MD, 81 mg at 11/15/23 0748    atorvastatin (LIPITOR) tablet 80 mg, 80 mg, Oral, Nightly, Pk Snow MD, 80 mg at 11/14/23 2100    sennosides-docusate (PERICOLACE) 8.6-50 MG per tablet 2 tablet, 2 tablet, Oral, BID, 2 tablet at 11/15/23 0748 **AND** polyethylene glycol (MIRALAX) packet 17 g, 17 g, Oral, Daily PRN **AND** bisacodyl (DULCOLAX) EC tablet 5 mg, 5 mg, Oral, Daily PRN **AND** bisacodyl (DULCOLAX) suppository 10 mg, 10 mg, Rectal, Daily PRN, Pk Snow MD    carvedilol (COREG) tablet 6.25 mg, 6.25 mg, Oral, BID With Meals, Pk Snow MD, 6.25 mg at 11/15/23 0748    folic acid (FOLVITE) tablet 1 mg, 1 mg, Oral, Daily, Pk Snow MD, 1 mg at 11/15/23 0748    hydrALAZINE (APRESOLINE) tablet 100 mg, 100 mg, Oral, Q8H, Pk Snow MD, 100 mg at 11/15/23 1442    hydroCHLOROthiazide (HYDRODIURIL) tablet 12.5 mg, 12.5 mg, Oral, Daily, Pk Snow MD, 12.5 mg at 11/15/23 0748    hydrOXYzine (ATARAX) tablet 50 mg, 50 mg, Oral, TID PRN, Pk Snow MD, 50 mg at 11/15/23 0747    influenza vac split quad (FLUZONE,FLUARIX,AFLURIA,FLULAVAL) injection 0.5 mL, 0.5 mL, Intramuscular, During Hospitalization, Pk Snow MD    lisinopril (PRINIVIL,ZESTRIL) tablet 40 mg, 40 mg, Oral, Daily, Pk Snow MD, 40 mg at 11/15/23 0747    melatonin tablet 10 mg, 10 mg, Oral, Nightly, Pk Snow MD, 10 mg at 11/14/23 2059    multivitamin with minerals 1 tablet, 1 tablet, Oral, Daily, Pk Snow MD, 1 tablet at 11/15/23 0748    pantoprazole (PROTONIX) EC tablet 40 mg, 40 mg, Oral, Q AM, Pk Snow MD, 40 mg at 11/15/23 0533    potassium chloride (K-DUR,KLOR-CON) ER tablet 10 mEq, 10 mEq, Oral, Daily, Pk Snow MD, 10  mEq at 11/15/23 0748    thiamine (VITAMIN B-1) tablet 100 mg, 100 mg, Oral, Daily, Pk Snow MD, 100 mg at 11/15/23 0748    ticagrelor (BRILINTA) tablet 60 mg, 60 mg, Oral, BID, Pk Snow MD, 60 mg at 11/15/23 0749    vilazodone (VIIBRYD) tablet 40 mg, 40 mg, Oral, Daily, Pk Snow MD, 40 mg at 11/15/23 0747    Assessment and Plan:       Active Hospital Problems    Diagnosis  POA    **Concussion [S06.0XAA]  Yes      Resolved Hospital Problems   No resolved problems to display.     Sub acute cerebellar stroke  Hypertension , previously on spirolactone and well controlled. It looks like this was stopped due to some mild renal insufficiency. His renal function has remained stable. Electrolytes are good with some mild hypokalemia. I will add this back and monitor labs and vitals. Continue hydralazine, carvedilol, lisinopril and amlodipine.   Hyperlipidemia  Plavix nonresponder  GERD with history of Lepe's esophagus.     Start spironolactone. Discontinue potassium supplement.     Gin Barron, APRN  11/15/23  16:46 EST

## 2023-11-15 NOTE — PROGRESS NOTES
Inpatient Rehabilitation Plan of Care Note    Plan of Care  Care Plan Reviewed - No updates at this time.    Safety    [RN] Potential for Injury(Active)  Current Status(11/14/2023): Uses call light appropriately  Weekly Goal(11/20/2023): Cues to use call light  Discharge Goal: Items within reach    Performed Intervention(s)  Items within reach  Safety rounds  Bed alarm/chair alarm      Psychosocial    [RN] Behavior(Active)  Current Status(11/14/2023): Anxiety related to hx of strokes and fall  Weekly Goal(11/21/2023): Allow opportunity to express concers regarding current  status.  Discharge Goal: Demonstrate healthy oping strategies.    Performed Intervention(s)  Medication  verbalize needs and concerns      Sphincter Control    [RN] Bowel Management(Active)  Current Status(11/14/2023): continent 100%  Weekly Goal(11/24/2023): continet 100%  Discharge Goal: continent 100%    [RN] Bladder Management(Active)  Current Status(11/14/2023): continent 100%  Weekly Goal(11/23/2023): Continent 100%  Discharge Goal: Continent 100%    Performed Intervention(s)  Therapeutic exercises/modalities  Elimination schedule  Encourage appropriate diet    Signed by: Anu Sotelo RN

## 2023-11-15 NOTE — THERAPY TREATMENT NOTE
Inpatient Rehabilitation - Speech Language Pathology Treatment Note    [facility]     Patient Name: Flako Coreas  : 1965  MRN: [id number]    Today's Date: 11/15/2023                   Admit Date: 2023       Visit Dx:    No diagnosis found.    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    Acute CVA (cerebrovascular accident)    HTN (hypertension)    Late effect of cerebrovascular accident (CVA)    Concussion    Fall    Head injury, closed, with concussion    Alcohol abuse       Past Medical History:   Diagnosis Date    ADHD (attention deficit hyperactivity disorder)     On going issue    Anxiety     Lepe's esophagus     Benign prostatic hyperplasia Surgery in 2021    Clotting disorder Intestinal    Depression     Diverticulitis     Diverticulosis     Duodenitis     Enteritis     Failure to thrive (child)     Family history of blood clots     GERD (gastroesophageal reflux disease)     Hyperlipidemia     Hypertension     Hypertensive emergency     Low testosterone     Microcytosis     Pancreatitis     Pneumonia     PONV (postoperative nausea and vomiting)     Seasonal affective disorder     Shoulder injury     Stroke     Unexplained weight loss        Past Surgical History:   Procedure Laterality Date    COLON SURGERY      COLONOSCOPY      COLONOSCOPY N/A 2022    Procedure: COLONOSCOPY INTO CECUM WITH HOT SNARE POLYPECTOMY;  Surgeon: Albert Gallo MD;  Location: [facility] ENDOSCOPY;  Service: Gastroenterology;  Laterality: N/A;  PRE- GI BLEED  POST- DIVERTICULOSIS, POLYP    ENDOSCOPY N/A  12/10/2022    Procedure: ESOPHAGOGASTRODUODENOSCOPY;  Surgeon: Albert Gallo MD;  Location: [facility] ENDOSCOPY;  Service: Gastroenterology;  Laterality: N/A;  PRE- DARK STOOLS  POST- BARRETTS ESOPHAGUS    FRACTURE SURGERY  1980    for broken arm    FRACTURE SURGERY      for broken hand    INCISION AND DRAINAGE ABSCESS  2015    anal    PROSTATE SURGERY      SMALL INTESTINE SURGERY      TONSILLECTOMY         SLP Recommendation and Plan                                                               SLP EVALUATION (last 72 hours)       SLP SLC Evaluation       Row Name 11/15/23 1300 11/15/23 1030 11/14/23 1300 11/14/23 1030 11/13/23 1330       Communication Assessment/Intervention    Document Type therapy note (daily note)  -AL therapy note (daily note)  -AL therapy note (daily note)  -AL therapy note (daily note)  -AL therapy note (daily note)  -AL    Patient/Family/Caregiver Comments/Observations Pt participated well.  -AL Pt participated well.  -AL Pt participated well.  -AL Pt participated well.  -AL Pt participated well.  -AL       Pain Scale: Numbers Pre/Post-Treatment    Pretreatment Pain Rating 0/10 - no pain  -AL 0/10 - no pain  -AL 0/10 - no pain  -AL 0/10 - no pain  -AL 0/10 - no pain  -AL    Posttreatment Pain Rating -- 0/10 - no pain  -AL -- -- --      Row Name 11/13/23 1000                   Communication Assessment/Intervention    Document Type therapy note (daily note)  -AL        Patient/Family/Caregiver Comments/Observations Pt participated well. Reported difficulty with working memory when watching sports this weekend.  -AL           Pain Scale: Numbers Pre/Post-Treatment    Pretreatment Pain Rating 0/10 - no pain  -AL        Posttreatment Pain Rating 0/10 - no pain  -AL                  User Key  (r) = Recorded By, (t) = Taken By, (c) = Cosigned By      Initials Name Effective Dates    Yoselyn Shah MS CCC-SLP 06/16/21 -                        EDUCATION    The patient has been educated in the  following areas:       Cognitive Impairment Communication Impairment.             SLP GOALS       Row Name 11/15/23 1300 11/15/23 1030 11/14/23 1300       Memory Skills Goal 1 (SLP)    Improve Memory Skills Through Goal 1 (SLP) recalling unrelated word lists with an imposed delay;listen to a paragraph and answer questions;repeat list in sequential order;recall details from a word list;use memory strategies;90%;independently (over 90% accuracy)  -AL -- --    Time Frame (Memory Skills Goal 1, SLP) 1 week  -AL -- --    Progress/Outcomes (Memory Skills Goal 1, SLP) good progress toward goal  -AL -- --    Comment (Memory Skills Goal 1, SLP) Working memory for 3 words reversed: 100% with NO cues. 4 words reversed: 50% with NO cues, 80% with MIN cues, 100% wtih MOD-MAX cues.  -AL -- --       Reasoning Goal 1 (SLP)    Improve Reasoning Through Goal 1 (SLP) -- complete high level reasoning task;80%;independently (over 90% accuracy)  -AL complete high level reasoning task;80%;independently (over 90% accuracy)  -AL    Time Frame (Reasoning Goal 1, SLP) -- 1 week  -AL 1 week  -AL    Progress/Outcomes (Reasoning Goal 1, SLP) -- good progress toward goal  -AL --    Comment (Reasoning Goal 1, SLP) -- Pt completed logic puzzle from previous session with overall 95% accuracy with NO cues, 100% with MIN cues.  -AL Reasoning for moderately complex logic puzzle: 100% with NO cues thus far. Pt received phone call cancelling an MD appointment during this task. He became upset and was unable to focus on completion of task. SLP contacted to nurse assist him with rescheduling his appointment.  -AL       Functional Math Skills Goal 1 (SLP)    Improve Functional Math Skills Through Goal 1 (SLP) complete functional math task;90%;independently (over 90% accuracy)  -AL complete functional math task;90%;independently (over 90% accuracy)  -AL complete functional math task;90%;independently (over 90% accuracy)  -AL    Time Frame (Functional Math  Skills Goal 1, SLP) 1 week  -AL 1 week  -AL 1 week  -AL    Progress/Outcomes (Functional Math Skills Goal 1, SLP) good progress toward goal  -AL good progress toward goal  -AL good progress toward goal  -AL    Comment (Functional Math Skills Goal 1, SLP) Completed home renovation task (calculating area and prices): 80% with NO cues, 100% with MOD cues.  -AL Home renovation math (calculating area): 60% with NO cues, 100% with MIN-MOD cues.  -AL Home renovation math (calculating area): 80% with NO cues, 100% with MIN cues. Mild impulsivity noted during this task.  -AL       Executive Functional Skills Goal 1 (SLP)    Improve Executive Function Skills Goal 1 (SLP) -- identify strategies, strengths, limitations;home management activity;perform self-evaluation;90%;independently (over 90% accuracy)  -AL --    Time Frame (Executive Function Skills Goal 1, SLP) -- 1 week  -AL --    Progress/Outcomes (Executive Function Skills Goal 1, SLP) -- good progress toward goal  -AL --    Comment (Executive Function Skills Goal 1, SLP) -- Pt with good awareness of impact of emotions/anxiety on his performance during therapy task yesterday. Discussed strategies to assist, such as taking a break and managing emotions before returning to task.  -AL --      Row Name 11/14/23 1030 11/13/23 1330 11/13/23 1000       Patient will demonstrate functional cognitive-linguistic skills for return to discharge environment    [place] with use of compensatory strategies  -AL -- --    Time frame by discharge  -AL -- --    Progress/Outcomes good progress toward goal  -AL -- --       Word Retrieval Skills Goal 1 (SLP)    Improve Word Retrieval Skills By Goal 1 (SLP) -- -- low frequency;responsive naming task;confrontational naming task;supplying an antonym;supplying a synonym;completing a divergent task;80%;independently (over 90% accuracy)  -AL    Time Frame (Word Retrieval Goal 1, SLP) -- -- 1 week  -AL    Progress/Outcomes (Word Retrieval Goal 1,  "SLP) -- -- good progress toward goal  -AL    Comment (Word Retrieval Goal 1, SLP) Pt answered a mock interview question with initial fillers \"um\" and revisions noted; however, he was then able to communicate a complex story clearly and answered follow-up questions from clinician with NO cues.  -AL -- Responsive naming with given word ending (\"rowan\"): 70% with NO cues, 100% with MIN cues.  -AL       Memory Skills Goal 1 (SLP)    Improve Memory Skills Through Goal 1 (SLP) recalling unrelated word lists with an imposed delay;listen to a paragraph and answer questions;repeat list in sequential order;recall details from a word list;use memory strategies;90%;independently (over 90% accuracy)  -AL -- recalling unrelated word lists with an imposed delay;listen to a paragraph and answer questions;repeat list in sequential order;recall details from a word list;use memory strategies;90%;independently (over 90% accuracy)  -AL    Time Frame (Memory Skills Goal 1, SLP) 1 week  -AL -- 1 week  -AL    Progress/Outcomes (Memory Skills Goal 1, SLP) good progress toward goal  -AL -- good progress toward goal  -AL    Comment (Memory Skills Goal 1, SLP) 4 unit list retention task (word ordering): 20% with NO cues, 70% with repetition, 80% with MIN cues, 100% with MOD cues. Immediate memory for voicemails using note-taking strategy: 85% with NO cues, 100% with MIN cues.  -AL -- 4 unit list retention task (word order): 60% with NO cues, 100% with MIN-MOD cues. Immediate memory for voicemails using note-takin% with NO cues, 100% with MIN cues.  -AL       Reasoning Goal 1 (SLP)    Improve Reasoning Through Goal 1 (SLP) -- complete high level reasoning task;80%;independently (over 90% accuracy)  -AL --    Time Frame (Reasoning Goal 1, SLP) -- 1 week  -AL --    Progress/Outcomes (Reasoning Goal 1, SLP) -- good progress toward goal  -AL --    Comment (Reasoning Goal 1, SLP) -- Reasoning for moderately complex logic puzzle: 100% with NO " cues. Required NO cues for self-monitoring strategies.  -AL --       Functional Math Skills Goal 1 (SLP)    Improve Functional Math Skills Through Goal 1 (SLP) -- complete functional math task;90%;independently (over 90% accuracy)  -AL --    Time Frame (Functional Math Skills Goal 1, SLP) -- 1 week  -AL --    Progress/Outcomes (Functional Math Skills Goal 1, SLP) -- good progress toward goal  -AL --    Comment (Functional Math Skills Goal 1, SLP) -- Home renovation math (calculating area): 60% with NO cues, 100% with MIN cues.  -AL --              User Key  (r) = Recorded By, (t) = Taken By, (c) = Cosigned By      Initials Name Provider Type    Yoselyn Shah MS CCC-SLP Speech and Language Pathologist                                Time Calculation:        Time Calculation- SLP       Row Name 11/15/23 1539 11/15/23 1110          Time Calculation- SLP    SLP Start Time 1300  -AL 1030  -AL     SLP Stop Time 1330  -AL 1100  -AL     SLP Time Calculation (min) 30 min  -AL 30 min  -AL     SLP Received On 11/15/23  -AL 11/15/23  -AL               User Key  (r) = Recorded By, (t) = Taken By, (c) = Cosigned By      Initials Name Provider Type    Yoselyn Shah MS CCC-SLP Speech and Language Pathologist                      Therapy Charges for Today       Code Description Service Date Service Provider Modifiers Qty    50754753083 HC ST DEV OF COGN SKILLS INITIAL 15 MIN 11/14/2023 Yoselyn Gudino MS CCC-SLP  1    51955238880 HC ST DEV OF COGN SKILLS EACH ADDT'L 15 MIN 11/14/2023 Yoselyn Gudino MS CCC-SLP  3    73291312911 HC ST DEV OF COGN SKILLS INITIAL 15 MIN 11/15/2023 Yoselyn Gudino MS CCC-SLP  1    04546190654 HC ST DEV OF COGN SKILLS EACH ADDT'L 15 MIN 11/15/2023 Yoselyn Gudino MS CCC-SLP  3                             Yoselyn Gudino MS CCC-SLP  11/15/2023

## 2023-11-15 NOTE — THERAPY TREATMENT NOTE
Inpatient Rehabilitation - Physical Therapy Treatment Note       [facility]     Patient Name: Flako Coreas  : 1965  MRN: [id number]    Today's Date: 11/15/2023                    Admit Date: 2023      Visit Dx:   No diagnosis found.    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    Acute CVA (cerebrovascular accident)    HTN (hypertension)    Late effect of cerebrovascular accident (CVA)    Concussion    Fall    Head injury, closed, with concussion    Alcohol abuse       Past Medical History:   Diagnosis Date    ADHD (attention deficit hyperactivity disorder)     On going issue    Anxiety     Lepe's esophagus     Benign prostatic hyperplasia Surgery in 2021    Clotting disorder Intestinal    Depression     Diverticulitis     Diverticulosis     Duodenitis     Enteritis     Failure to thrive (child)     Family history of blood clots     GERD (gastroesophageal reflux disease)     Hyperlipidemia     Hypertension     Hypertensive emergency     Low testosterone     Microcytosis     Pancreatitis     Pneumonia     PONV (postoperative nausea and vomiting)     Seasonal affective disorder     Shoulder injury     Stroke     Unexplained weight loss        Past Surgical History:   Procedure Laterality Date    COLON SURGERY      COLONOSCOPY      COLONOSCOPY N/A 2022    Procedure: COLONOSCOPY INTO CECUM WITH HOT SNARE POLYPECTOMY;  Surgeon: Albert Gallo MD;  Location: [facility] ENDOSCOPY;  Service: Gastroenterology;  Laterality: N/A;  PRE- GI BLEED  POST- DIVERTICULOSIS, POLYP    ENDOSCOPY N/A 12/10/2022     Procedure: ESOPHAGOGASTRODUODENOSCOPY;  Surgeon: Albert Gallo MD;  Location: [facility] ENDOSCOPY;  Service: Gastroenterology;  Laterality: N/A;  PRE- DARK STOOLS  POST- BARRETTS ESOPHAGUS    FRACTURE SURGERY  1980    for broken arm    FRACTURE SURGERY      for broken hand    INCISION AND DRAINAGE ABSCESS  2015    anal    PROSTATE SURGERY      SMALL INTESTINE SURGERY      TONSILLECTOMY         PT ASSESSMENT (last 12 hours)       IRF PT Evaluation and Treatment       Row Name 11/15/23 1136          PT Time and Intention    Document Type daily treatment  -DP     Mode of Treatment individual therapy;physical therapy  -DP     Patient/Family/Caregiver Comments/Observations PT sitting up in w/c upon PT arrival  -DP       Row Name 11/15/23 1136          General Information    Patient Profile Reviewed yes  -DP     Existing Precautions/Restrictions fall  -DP       Row Name 11/15/23 1136          Pain Assessment    Pretreatment Pain Rating 0/10 - no pain  -DP     Posttreatment Pain Rating 0/10 - no pain  -DP       Row Name 11/15/23 1136          Cognition/Psychosocial    Affect/Mental Status (Cognition) WFL  -DP     Orientation Status (Cognition) oriented x 4  -DP     Follows Commands (Cognition) follows one-step commands;over 90% accuracy  -DP     Personal Safety Interventions safety round/check completed;fall prevention program maintained;elopement precautions initiated;gait belt;muscle strengthening facilitated;nonskid shoes/slippers when out of bed;supervised activity  -DP       Row Name 11/15/23 1136          Bed Mobility    Sit-Supine [place] (Bed Mobility) standby assist  -DP     Assistive Device (Bed Mobility) bed rails;head of bed elevated  -DP       Row Name 11/15/23 1136          Chair-Bed Transfer    Chair-Bed [place] (Transfers) verbal cues;contact guard;minimum assist (75% patient effort)  -DP     Assistive Device (Chair-Bed Transfers) wheelchair  -DP       Row Name 11/15/23 1136          Sit-Stand  Transfer    Sit-Stand [place] (Transfers) set up;standby assist;contact guard  -DP     Assistive Device (Sit-Stand Transfers) wheelchair  -DP       Row Name 11/15/23 1136          Stand-Sit Transfer    Stand-Sit [place] (Transfers) standby assist;set up;contact guard  -DP     Assistive Device (Stand-Sit Transfers) wheelchair  -DP     Comment, (Stand-Sit Transfer) VC to line self up with W/C and bring walker with him before sitting  -DP       Row Name 11/15/23 1136          Gait/Stairs (Locomotion)    [place] Level (Gait) verbal cues;minimum assist (75% patient effort)  -DP     Assistive Device (Gait) walker, front-wheeled  -DP     Distance in Feet (Gait) 80'x3, 50'x1, 20'x4 through step ladder, 16'x2 in // bar, 48' at deana bar  -DP     Deviations/Abnormal Patterns (Gait) gait speed decreased;base of support, narrow;scissoring;ataxic  -DP     Bilateral Gait Deviations forward flexed posture  -DP     Left Sided Gait Deviations foot drop/toe drag;heel strike decreased  -DP     Gait Assessment/Intervention varied step length; performed trials with/without 1.5 ankle weight, with mirror and with placement mat. Mirror had little to no improvement with step length. Placement mat improve step length and consistency but decreased fluidity.  -DP     Comment, (Gait/Stairs) attempted amulating in // bar with BUE hovering // bars with suspected increased unsteainess but did improve step length. Trialed again with 1UE support at deana bar with cues for heel to toe but increasing gait speed with good step length 30-40% of the time. However, pt did have instance where he let go of deana bar and rasied his RLE high in the air but only required CGA/ light Natasha to recover.  -DP       Row Name 11/15/23 1136          Safety Issues, Functional Mobility    Impairments Affecting Function (Mobility) balance;coordination;strength  -DP       Row Name 11/15/23 1136          Balance    Comment, Balance Pt stood at 6 inch curb with  no UE support and performed alt tapping with 2-3 LOB but maintain balance with CGA/Natasha 10 reps. Standing on solid ground feet together and feet apart 20 seconds, On blue foam mat for 15 seconds Natasha.  -DP       Row Name 11/15/23 1136          Positioning and Restraints    Pre-Treatment Position sitting in chair/recliner  -DP     Post Treatment Position bed  -DP     In Bed supine;call light within reach;encouraged to call for assist;exit alarm on  -DP               User Key  (r) = Recorded By, (t) = Taken By, (c) = Cosigned By      Initials Name Provider Type    DP Papo Houston, CARIN Physical Therapist                     Physical Therapy Education       Title: PT OT SLP Therapies (Done)       Topic: Physical Therapy (Done)       Point: Mobility training (Done)       Learning Progress Summary             Patient Acceptance, E,D, VU,DU by DP at 11/15/2023 1141    Acceptance, E,D, VU,DU by DP at 11/8/2023 1551                         Point: Home exercise program (Done)       Learning Progress Summary             Patient Acceptance, E,D, VU,DU by DP at 11/8/2023 1551                         Point: Body mechanics (Done)       Learning Progress Summary             Patient Acceptance, E,D, VU,DU by DP at 11/8/2023 1551                         Point: Precautions (Done)       Learning Progress Summary             Patient Acceptance, E, VU by MD at 11/14/2023 1606    Acceptance, E, VU by MD at 11/13/2023 0912    Acceptance, E, VU by MD at 11/11/2023 0927    Acceptance, E, VU by MD at 11/10/2023 1117    Acceptance, E, VU by MD at 11/9/2023 0840    Acceptance, E,D, VU,DU by FINA at 11/8/2023 1551    Acceptance, E, VU by MD at 11/7/2023 1605                                         User Key       Initials Effective Dates Name Provider Type Discipline    MD 06/16/21 -  Trinity Velez, PT Physical Therapist PT    FINA 08/24/21 -  Papo Houston PT Physical Therapist PT                    PT Recommendation and Plan                           Time Calculation:      PT Charges       Row Name 11/15/23 1447 11/15/23 1446          Time Calculation    Start Time 1400  -DP 1100  -DP     Stop Time 1430  -DP 1130  -DP     Time Calculation (min) 30 min  -DP 30 min  -DP     PT Received On -- 11/15/23  -DP     PT - Next Appointment -- 11/16/23  -DP        Time Calculation- PT    Total Timed Code Minutes- PT 30 minute(s)  -DP 30 minute(s)  -DP               User Key  (r) = Recorded By, (t) = Taken By, (c) = Cosigned By      Initials Name Provider Type    DP Papo Houston, PT Physical Therapist                    Therapy Charges for Today       Code Description Service Date Service Provider Modifiers Qty    40752373552 HC GAIT TRAINING EA 15 MIN 11/15/2023 Papo Houston, PT GP 2    29405878488 HC GAIT TRAINING EA 15 MIN 11/15/2023 Papo Houston, PT GP 1    42793798082 HC PT NEUROMUSC RE EDUCATION EA 15 MIN 11/15/2023 Papo Houston, PT GP 1              PT G-Codes  AM-PAC 6 Clicks Score (PT): 24      Papo Houston, PT  11/15/2023

## 2023-11-15 NOTE — PROGRESS NOTES
Inpatient Rehabilitation Plan of Care Note    Plan of Care  Updated Problems/Interventions  Mobility    [PT] Bed/Chair/Wheelchair(Active)  Current Status(11/15/2023): CGA/SBA  Weekly Goal(11/23/2023): SBA  Discharge Goal: SUP    [PT] Bed Mobility(Active)  Current Status(11/15/2023): SUP  Weekly Goal(11/23/2023): TABITHA  Discharge Goal: IND    [PT] Stairs(Active)  Current Status(11/15/2023): 4 CGA  Weekly Goal(11/23/2023): PT ONLY  Discharge Goal: 12 supervison    [PT] Walk(Active)  Current Status(11/15/2023): 160 FWW CGA  Weekly Goal(11/23/2023): PT ONLY  Discharge Goal: 160' with FWW and Supervision    Signed by: Papo Houston, PT

## 2023-11-15 NOTE — PROGRESS NOTES
Recreational Therapy Note    Patient Name: Flako Coreas   MRN: [id number]    Therapeutic Recreation Eval and Treat (last 12 hours)       Therapeutic Recreation Eval & Treat       Row Name 11/15/23 1400       Therapeutic Recreation Participation    Recreation Therapy Participation games  -TW    Games card games  AIMEE  -TW    Objectives of Recreation Participation increase;sense of autonomy by choosing level of participation;positive attitudes leading to a healthy leisure lifestyle  -TW    Comment, Recreation Participation BUE use to hold/place cards; occ cues for directions  -TW    Recreation Therapy Summary of Participation active participation  -TW              User Key  (r) = Recorded By, (t) = Taken By, (c) = Cosigned By      Initials Name Provider Type    TW Olya Suarez CTRS Recreational Therapist                      POOJA Rice  11/15/2023

## 2023-11-15 NOTE — PROGRESS NOTES
Hypertension-systolic blood pressure in the 150s on multiple medications-  will get input from cardiology service

## 2023-11-15 NOTE — THERAPY TREATMENT NOTE
Inpatient Rehabilitation - Occupational Therapy Treatment Note    [facility]     Patient Name: Flako Coreas  : 1965  MRN: [id number]    Today's Date: 11/15/2023                 Admit Date: 2023       No diagnosis found.    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    Acute CVA (cerebrovascular accident)    HTN (hypertension)    Late effect of cerebrovascular accident (CVA)    Concussion    Fall    Head injury, closed, with concussion    Alcohol abuse       Past Medical History:   Diagnosis Date    ADHD (attention deficit hyperactivity disorder)     On going issue    Anxiety     Lepe's esophagus     Benign prostatic hyperplasia Surgery in 2021    Clotting disorder Intestinal    Depression     Diverticulitis     Diverticulosis     Duodenitis     Enteritis     Failure to thrive (child)     Family history of blood clots     GERD (gastroesophageal reflux disease)     Hyperlipidemia     Hypertension     Hypertensive emergency     Low testosterone     Microcytosis     Pancreatitis     Pneumonia     PONV (postoperative nausea and vomiting)     Seasonal affective disorder     Shoulder injury     Stroke     Unexplained weight loss        Past Surgical History:   Procedure Laterality Date    COLON SURGERY      COLONOSCOPY      COLONOSCOPY N/A 2022    Procedure: COLONOSCOPY INTO CECUM WITH HOT SNARE POLYPECTOMY;  Surgeon: Albert Gallo MD;  Location: [facility] ENDOSCOPY;  Service: Gastroenterology;  Laterality: N/A;  PRE- GI BLEED  POST- DIVERTICULOSIS, POLYP    ENDOSCOPY N/A 12/10/2022    Procedure:  ESOPHAGOGASTRODUODENOSCOPY;  Surgeon: Albert Gallo MD;  Location: [facility] ENDOSCOPY;  Service: Gastroenterology;  Laterality: N/A;  PRE- DARK STOOLS  POST- BARRETTS ESOPHAGUS    FRACTURE SURGERY  1980    for broken arm    FRACTURE SURGERY      for broken hand    INCISION AND DRAINAGE ABSCESS  2015    anal    PROSTATE SURGERY      SMALL INTESTINE SURGERY      TONSILLECTOMY               IRF OT ASSESSMENT FLOWSHEET (last 12 hours)       IRF OT Evaluation and Treatment       Row Name 11/15/23 1712          OT Time and Intention    Document Type daily treatment  -CC     Mode of Treatment occupational therapy  -CC     Patient Effort good  -CC     Symptoms Noted During/After Treatment none  -CC       Row Name 11/15/23 1712          Pain Assessment    Pretreatment Pain Rating 0/10 - no pain  -CC     Posttreatment Pain Rating 0/10 - no pain  -CC       Row Name 11/15/23 1712          Cognition/Psychosocial    Affect/Mental Status (Cognition) WFL  -CC     Orientation Status (Cognition) oriented x 4  -CC     Follows Commands (Cognition) follows one-step commands  -CC     Personal Safety Interventions fall prevention program maintained;gait belt;nonskid shoes/slippers when out of bed  -CC       Row Name 11/15/23 1712          Bathing    [place] Level (Bathing) bathing skills;lower body;upper body;standby assist;set up  -CC     Assistive Device (Bathing) hand held shower spray hose;shower chair;grab bar/tub rail  -CC     Position (Bathing) supported sitting;supported standing  -CC     Set-up Assistance (Bathing) adaptive equipment set-up;adjust water temperature;obtain supplies  -CC       Row Name 11/15/23 1712          Upper Body Dressing    [place] Level (Upper Body Dressing) upper body dressing skills;doff;don;pull over garment;set up assistance  -CC     Position (Upper Body Dressing) supported sitting  -CC     Set-up Assistance (Upper Body Dressing) obtain clothing  -CC       Row Name 11/15/23 1712           Lower Body Dressing    [place] Level (Lower Body Dressing) doff;don;pants/bottoms;shoes/slippers;underwear;socks;set up;standby assist;contact guard assist  -     Position (Lower Body Dressing) supported sitting;supported standing  -CC     Set-up Assistance (Lower Body Dressing) obtain clothing  -     Comment (Lower Body Dressing) vc for impulsivity  -       Row Name 11/15/23 1712          Grooming    [place] Level (Grooming) grooming skills;deodorant application;oral care regimen;wash face, hands;set up;standby assist  -     Position (Grooming) sink side;supported standing  -CC     Set-up Assistance (Grooming) obtain supplies  -       Row Name 11/15/23 1712          Bed Mobility    Rolling Left [place] (Bed Mobility) supervision  -     Supine-Sit [place] (Bed Mobility) standby assist  -       Row Name 11/15/23 1712          Bed-Chair Transfer    Bed-Chair [place] (Transfers) verbal cues;contact guard;standby assist  -     Assistive Device (Bed-Chair Transfers) wheelchair  -       Row Name 11/15/23 1712          Chair-Bed Transfer    Chair-Bed [place] (Transfers) verbal cues;contact guard;standby assist  -     Assistive Device (Chair-Bed Transfers) wheelchair  -       Row Name 11/15/23 1712          Sit-Stand Transfer    Sit-Stand [place] (Transfers) set up;standby assist;contact guard  -     Assistive Device (Sit-Stand Transfers) wheelchair  -       Row Name 11/15/23 1712          Stand-Sit Transfer    Stand-Sit [place] (Transfers) standby assist;set up;contact guard  -     Assistive Device (Stand-Sit Transfers) wheelchair  -       Row Name 11/15/23 1712          Shower Transfer    Type (Shower Transfer) stand-sit;sit-stand;stand pivot/stand step  -     [place] Level (Shower Transfer) set up;contact guard;stand by assist  -     Assistive Device (Shower Transfer) walker, front-wheeled;grab bar, tub/shower;tub bench  -       Row Name  11/15/23 1712          Balance    Static Sitting Balance independent  -CC     Static Standing Balance standby assist  -CC     Dynamic Standing Balance contact guard;standby assist  -CC     Comment, Balance pt stood at table and reached across midline and placed wood beads and clothes pins on dowel. Pt had more control when slowing down. Pt less feraful when not rushing and over exaggerating movements. CGA/SBA provided  -CC       Row Name 11/15/23 1712          Positioning and Restraints    Pre-Treatment Position in bed  -CC     Post Treatment Position bed  -CC     In Bed supine;notified nsg;call light within reach;encouraged to call for assist;exit alarm on  -CC               User Key  (r) = Recorded By, (t) = Taken By, (c) = Cosigned By      Initials Name Effective Dates    CC Rosa Chris OTR 06/16/21 -                      Occupational Therapy Education       Title: PT OT SLP Therapies (Done)       Topic: Occupational Therapy (Done)       Point: ADL training (Done)       Description:   Instruct learner(s) on proper safety adaptation and remediation techniques during self care or transfers.   Instruct in proper use of assistive devices.                  Learning Progress Summary             Patient Acceptance, E, VU by  at 11/7/2023 1649    Comment: Explanation of OT services, evaluation results and goal setting                         Point: Home exercise program (Done)       Description:   Instruct learner(s) on appropriate technique for monitoring, assisting and/or progressing therapeutic exercises/activities.                  Learning Progress Summary             Patient Acceptance, E, VU by  at 11/7/2023 1642    Comment: Explanation of OT services, evaluation results and goal setting                         Point: Precautions (Done)       Description:   Instruct learner(s) on prescribed precautions during self-care and functional transfers.                  Learning Progress Summary              Patient Acceptance, E, VU by  at 11/7/2023 1649    Comment: Explanation of OT services, evaluation results and goal setting                         Point: Body mechanics (Done)       Description:   Instruct learner(s) on proper positioning and spine alignment during self-care, functional mobility activities and/or exercises.                  Learning Progress Summary             Patient Acceptance, E, VU by  at 11/7/2023 1649    Comment: Explanation of OT services, evaluation results and goal setting                                         User Key       Initials Effective Dates Name Provider Type Discipline     06/16/21 -  Rosa Chris OTR Occupational Therapist OT                        OT Recommendation and Plan    Planned Therapy Interventions (OT): activity tolerance training, adaptive equipment training, BADL retraining, functional balance retraining, neuromuscular control/coordination retraining, strengthening exercise, transfer/mobility retraining, patient/caregiver education/training                    Time Calculation:      Time Calculation- OT       Row Name 11/15/23 1230 11/15/23 0800          Time Calculation- OT    OT Start Time 1230  - 0800  -     OT Stop Time 1300  -CC 0830  -CC     OT Time Calculation (min) 30 min  - 30 min  -     OT Goal Re-Cert Due Date 11/22/23  - --               User Key  (r) = Recorded By, (t) = Taken By, (c) = Cosigned By      Initials Name Provider Type     Rosa Chris OTR Occupational Therapist                  Therapy Charges for Today       Code Description Service Date Service Provider Modifiers Qty    54527957335  OT NEUROMUSC RE EDUCATION EA 15 MIN 11/15/2023 Rosa Chris OTR GO 2    [id number]  OT SELF CARE/MGMT/TRAIN EA 15 MIN 11/15/2023 Rosa Chris OTR GO 2                     BEATA Mejia  11/15/2023

## 2023-11-15 NOTE — PLAN OF CARE
Goal Outcome Evaluation:  Plan of Care Reviewed With: patient           Outcome Evaluation: Slept early after due medications.Hydralazine given.Still c/o double vision,  numbness left hand ,face, cheek&toungue A&OX4.Assist x1.Continent B&B.KALYANI 11/14/23.

## 2023-11-16 ENCOUNTER — HOME HEALTH ADMISSION (OUTPATIENT)
Dept: HOME HEALTH SERVICES | Facility: HOME HEALTHCARE | Age: 58
End: 2023-11-16
Payer: COMMERCIAL

## 2023-11-16 LAB
ANION GAP SERPL CALCULATED.3IONS-SCNC: 10.1 MMOL/L (ref 5–15)
BASOPHILS # BLD AUTO: 0.04 10*3/MM3 (ref 0–0.2)
BASOPHILS NFR BLD AUTO: 0.6 % (ref 0–1.5)
BUN SERPL-MCNC: 12 MG/DL (ref 6–20)
BUN/CREAT SERPL: 12 (ref 7–25)
CALCIUM SPEC-SCNC: 9.2 MG/DL (ref 8.6–10.5)
CHLORIDE SERPL-SCNC: 105 MMOL/L (ref 98–107)
CO2 SERPL-SCNC: 25.9 MMOL/L (ref 22–29)
CREAT SERPL-MCNC: 1 MG/DL (ref 0.76–1.27)
DEPRECATED RDW RBC AUTO: 43.7 FL (ref 37–54)
EGFRCR SERPLBLD CKD-EPI 2021: 88.3 ML/MIN/1.73
EOSINOPHIL # BLD AUTO: 0.48 10*3/MM3 (ref 0–0.4)
EOSINOPHIL NFR BLD AUTO: 7.1 % (ref 0.3–6.2)
ERYTHROCYTE [DISTWIDTH] IN BLOOD BY AUTOMATED COUNT: 14.2 % (ref 12.3–15.4)
GLUCOSE SERPL-MCNC: 106 MG/DL (ref 65–99)
HCT VFR BLD AUTO: 38.5 % (ref 37.5–51)
HGB BLD-MCNC: 12.8 G/DL (ref 13–17.7)
IMM GRANULOCYTES # BLD AUTO: 0.01 10*3/MM3 (ref 0–0.05)
IMM GRANULOCYTES NFR BLD AUTO: 0.1 % (ref 0–0.5)
LYMPHOCYTES # BLD AUTO: 1.72 10*3/MM3 (ref 0.7–3.1)
LYMPHOCYTES NFR BLD AUTO: 25.6 % (ref 19.6–45.3)
MCH RBC QN AUTO: 27.9 PG (ref 26.6–33)
MCHC RBC AUTO-ENTMCNC: 33.2 G/DL (ref 31.5–35.7)
MCV RBC AUTO: 84.1 FL (ref 79–97)
MONOCYTES # BLD AUTO: 0.54 10*3/MM3 (ref 0.1–0.9)
MONOCYTES NFR BLD AUTO: 8 % (ref 5–12)
NEUTROPHILS NFR BLD AUTO: 3.94 10*3/MM3 (ref 1.7–7)
NEUTROPHILS NFR BLD AUTO: 58.6 % (ref 42.7–76)
NRBC BLD AUTO-RTO: 0 /100 WBC (ref 0–0.2)
PLATELET # BLD AUTO: 276 10*3/MM3 (ref 140–450)
PMV BLD AUTO: 9.5 FL (ref 6–12)
POTASSIUM SERPL-SCNC: 3.6 MMOL/L (ref 3.5–5.2)
RBC # BLD AUTO: 4.58 10*6/MM3 (ref 4.14–5.8)
SODIUM SERPL-SCNC: 141 MMOL/L (ref 136–145)
WBC NRBC COR # BLD: 6.73 10*3/MM3 (ref 3.4–10.8)

## 2023-11-16 PROCEDURE — 97530 THERAPEUTIC ACTIVITIES: CPT

## 2023-11-16 PROCEDURE — 97112 NEUROMUSCULAR REEDUCATION: CPT

## 2023-11-16 PROCEDURE — 85025 COMPLETE CBC W/AUTO DIFF WBC: CPT | Performed by: PHYSICAL MEDICINE & REHABILITATION

## 2023-11-16 PROCEDURE — 80048 BASIC METABOLIC PNL TOTAL CA: CPT | Performed by: PHYSICAL MEDICINE & REHABILITATION

## 2023-11-16 PROCEDURE — 97116 GAIT TRAINING THERAPY: CPT

## 2023-11-16 PROCEDURE — 97130 THER IVNTJ EA ADDL 15 MIN: CPT

## 2023-11-16 PROCEDURE — 97110 THERAPEUTIC EXERCISES: CPT

## 2023-11-16 PROCEDURE — 97129 THER IVNTJ 1ST 15 MIN: CPT

## 2023-11-16 RX ORDER — AMOXICILLIN 250 MG
2 CAPSULE ORAL 2 TIMES DAILY
Qty: 120 TABLET | Refills: 0 | Status: SHIPPED | OUTPATIENT
Start: 2023-11-16

## 2023-11-16 RX ORDER — DIPHENOXYLATE HYDROCHLORIDE AND ATROPINE SULFATE 2.5; .025 MG/1; MG/1
1 TABLET ORAL DAILY
Qty: 90 TABLET | Refills: 0 | Status: SHIPPED | OUTPATIENT
Start: 2023-11-17

## 2023-11-16 RX ORDER — PANTOPRAZOLE SODIUM 40 MG/1
40 TABLET, DELAYED RELEASE ORAL
Qty: 30 TABLET | Refills: 1 | Status: SHIPPED | OUTPATIENT
Start: 2023-11-17

## 2023-11-16 RX ORDER — HYDROCHLOROTHIAZIDE 12.5 MG/1
12.5 TABLET ORAL DAILY
Qty: 30 TABLET | Refills: 1 | Status: SHIPPED | OUTPATIENT
Start: 2023-11-17

## 2023-11-16 RX ORDER — CHOLECALCIFEROL (VITAMIN D3) 125 MCG
5 CAPSULE ORAL NIGHTLY
Qty: 90 TABLET | Refills: 0 | Status: SHIPPED | OUTPATIENT
Start: 2023-11-16

## 2023-11-16 RX ORDER — ACETAMINOPHEN 325 MG/1
650 TABLET ORAL EVERY 6 HOURS PRN
Start: 2023-11-16

## 2023-11-16 RX ORDER — FOLIC ACID 1 MG/1
1 TABLET ORAL DAILY
Qty: 90 TABLET | Refills: 0 | Status: SHIPPED | OUTPATIENT
Start: 2023-11-17

## 2023-11-16 RX ORDER — LANOLIN ALCOHOL/MO/W.PET/CERES
100 CREAM (GRAM) TOPICAL DAILY
Qty: 90 TABLET | Refills: 0 | Status: SHIPPED | OUTPATIENT
Start: 2023-11-17

## 2023-11-16 RX ADMIN — CARVEDILOL 6.25 MG: 6.25 TABLET, FILM COATED ORAL at 18:47

## 2023-11-16 RX ADMIN — HYDROXYZINE HYDROCHLORIDE 50 MG: 25 TABLET ORAL at 12:13

## 2023-11-16 RX ADMIN — HYDRALAZINE HYDROCHLORIDE 100 MG: 50 TABLET, FILM COATED ORAL at 14:10

## 2023-11-16 RX ADMIN — PANTOPRAZOLE SODIUM 40 MG: 40 TABLET, DELAYED RELEASE ORAL at 05:52

## 2023-11-16 RX ADMIN — ASPIRIN 81 MG: 81 TABLET, COATED ORAL at 08:37

## 2023-11-16 RX ADMIN — FOLIC ACID 1 MG: 1 TABLET ORAL at 08:38

## 2023-11-16 RX ADMIN — MULTIPLE VITAMINS W/ MINERALS TAB 1 TABLET: TAB at 08:38

## 2023-11-16 RX ADMIN — HYDRALAZINE HYDROCHLORIDE 100 MG: 50 TABLET, FILM COATED ORAL at 21:10

## 2023-11-16 RX ADMIN — Medication 10 MG: at 21:10

## 2023-11-16 RX ADMIN — TICAGRELOR 60 MG: 60 TABLET ORAL at 08:38

## 2023-11-16 RX ADMIN — AMLODIPINE BESYLATE 5 MG: 5 TABLET ORAL at 08:38

## 2023-11-16 RX ADMIN — SPIRONOLACTONE 25 MG: 25 TABLET, FILM COATED ORAL at 08:37

## 2023-11-16 RX ADMIN — HYDROCHLOROTHIAZIDE 12.5 MG: 12.5 TABLET ORAL at 08:37

## 2023-11-16 RX ADMIN — HYDRALAZINE HYDROCHLORIDE 100 MG: 50 TABLET, FILM COATED ORAL at 05:52

## 2023-11-16 RX ADMIN — TICAGRELOR 60 MG: 60 TABLET ORAL at 21:10

## 2023-11-16 RX ADMIN — LISINOPRIL 40 MG: 40 TABLET ORAL at 08:37

## 2023-11-16 RX ADMIN — CARVEDILOL 6.25 MG: 6.25 TABLET, FILM COATED ORAL at 08:38

## 2023-11-16 RX ADMIN — VILAZODONE HYDROCHLORIDE 40 MG: 40 TABLET, FILM COATED ORAL at 08:38

## 2023-11-16 RX ADMIN — ATORVASTATIN CALCIUM 80 MG: 80 TABLET, FILM COATED ORAL at 21:09

## 2023-11-16 RX ADMIN — Medication 100 MG: at 08:38

## 2023-11-16 NOTE — THERAPY TREATMENT NOTE
Inpatient Rehabilitation - Speech Language Pathology Treatment Note    [facility]     Patient Name: Flako Coreas  : 1965  MRN: [id number]    Today's Date: 2023                   Admit Date: 2023       Visit Dx:      ICD-10-CM ICD-9-CM   1. Closed head injury with concussion, without loss of consciousness, subsequent encounter  S06.0X0D V58.89   2. 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    Acute CVA (cerebrovascular accident)    HTN (hypertension)    Late effect of cerebrovascular accident (CVA)    Concussion    Fall    Head injury, closed, with concussion    Alcohol abuse       Past Medical History:   Diagnosis Date    ADHD (attention deficit hyperactivity disorder)     On going issue    Anxiety     Lepe's esophagus     Benign prostatic hyperplasia Surgery in 2021    Clotting disorder Intestinal    Depression     Diverticulitis     Diverticulosis     Duodenitis     Enteritis     Failure to thrive (child)     Family history of blood clots     GERD (gastroesophageal reflux disease)     Hyperlipidemia     Hypertension     Hypertensive emergency     Low testosterone     Microcytosis     Pancreatitis     Pneumonia     PONV (postoperative nausea and vomiting)     Seasonal affective disorder     Shoulder injury     Stroke     Unexplained weight loss        Past Surgical History:   Procedure Laterality Date    COLON SURGERY      COLONOSCOPY      COLONOSCOPY N/A 2022    Procedure: COLONOSCOPY INTO CECUM WITH HOT SNARE  POLYPECTOMY;  Surgeon: Albert Gallo MD;  Location: [facility] ENDOSCOPY;  Service: Gastroenterology;  Laterality: N/A;  PRE- GI BLEED  POST- DIVERTICULOSIS, POLYP    ENDOSCOPY N/A 12/10/2022    Procedure: ESOPHAGOGASTRODUODENOSCOPY;  Surgeon: Albert Gallo MD;  Location: [facility] ENDOSCOPY;  Service: Gastroenterology;  Laterality: N/A;  PRE- DARK STOOLS  POST- BARRETTS ESOPHAGUS    FRACTURE SURGERY  1980    for broken arm    FRACTURE SURGERY      for broken hand    INCISION AND DRAINAGE ABSCESS  2015    anal    PROSTATE SURGERY      SMALL INTESTINE SURGERY      TONSILLECTOMY         SLP Recommendation and Plan                                                               SLP EVALUATION (last 72 hours)       SLP SLC Evaluation       Row Name 11/16/23 1300 11/16/23 1030 11/15/23 1300 11/15/23 1030 11/14/23 1300       Communication Assessment/Intervention    Document Type therapy note (daily note)  -AL therapy note (daily note)  -AL therapy note (daily note)  -AL therapy note (daily note)  -AL therapy note (daily note)  -AL    Patient/Family/Caregiver Comments/Observations Pt participated well.  -AL Pt participated well. He reported he feels ready to go home. Reported he plans to reach to out to schedule counseling appointments for his mental health at discharge.  -AL Pt participated well.  -AL Pt participated well.  -AL Pt participated well.  -AL       Pain Scale: Numbers Pre/Post-Treatment    Pretreatment Pain Rating 0/10 - no pain  -AL 0/10 - no pain  -AL 0/10 - no pain  -AL 0/10 - no pain  -AL 0/10 - no pain  -AL    Posttreatment Pain Rating 0/10 - no pain  -AL 0/10 - no pain  -AL -- 0/10 - no pain  -AL --      Row Name 11/14/23 1030                   Communication Assessment/Intervention    Document Type therapy note (daily note)  -AL        Patient/Family/Caregiver Comments/Observations Pt participated well.  -AL           Pain Scale: Numbers Pre/Post-Treatment    Pretreatment Pain Rating 0/10 - no pain  -AL                   User Key  (r) = Recorded By, (t) = Taken By, (c) = Cosigned By      Initials Name Effective Dates    Yoselyn Shah, MS CCC-SLP 06/16/21 -                        EDUCATION    The patient has been educated in the following areas:       Cognitive Impairment Communication Impairment.             SLP GOALS       Row Name 11/16/23 1300 11/16/23 1030 11/15/23 1300       Memory Skills Goal 1 (SLP)    Improve Memory Skills Through Goal 1 (SLP) -- -- recalling unrelated word lists with an imposed delay;listen to a paragraph and answer questions;repeat list in sequential order;recall details from a word list;use memory strategies;90%;independently (over 90% accuracy)  -AL    Time Frame (Memory Skills Goal 1, SLP) -- -- 1 week  -AL    Progress/Outcomes (Memory Skills Goal 1, SLP) -- -- good progress toward goal  -AL    Comment (Memory Skills Goal 1, SLP) -- -- Working memory for 3 words reversed: 100% with NO cues. 4 words reversed: 50% with NO cues, 80% with MIN cues, 100% wtih MOD-MAX cues.  -AL       Reasoning Goal 1 (SLP)    Comment (Reasoning Goal 1, SLP) Initiated moderately complex logic puzzle: 100% with task so far. Plans to complete as homework.  -AL -- --       Functional Math Skills Goal 1 (SLP)    Improve Functional Math Skills Through Goal 1 (SLP) -- complete functional math task;90%;independently (over 90% accuracy)  -AL complete functional math task;90%;independently (over 90% accuracy)  -AL    Time Frame (Functional Math Skills Goal 1, SLP) -- 1 week  -AL 1 week  -AL    Progress/Outcomes (Functional Math Skills Goal 1, SLP) -- good progress toward goal  -AL good progress toward goal  -AL    Comment (Functional Math Skills Goal 1, SLP) -- Completed home renovation task (calculating area and prices for painting walls): 10/13 with NO cues, 13/13 with MIN-MOD cues.  -AL Completed home renovation task (calculating area and prices): 80% with NO cues, 100% with MOD cues.  -AL       Executive  "Functional Skills Goal 1 (SLP)    Improve Executive Function Skills Goal 1 (SLP) identify strategies, strengths, limitations;home management activity;perform self-evaluation;90%;independently (over 90% accuracy)  -AL identify strategies, strengths, limitations;home management activity;perform self-evaluation;90%;independently (over 90% accuracy)  -AL --    Time Frame (Executive Function Skills Goal 1, SLP) 1 week  -AL 1 week  -AL --    Progress/Outcomes (Executive Function Skills Goal 1, SLP) good progress toward goal  -AL good progress toward goal  -AL --    Comment (Executive Function Skills Goal 1, SLP) Medicine management with pill box (4x day) and pt's list: 100% with NO cues. Exhibited good awareness of importance of medication compliance.  -AL Had conversation with pt about discharge follow-up therapy and importance of having 24 hour supervision and assistance from his son. Pt voiced understanding.  -AL --      Row Name 11/15/23 1030 11/14/23 1300 11/14/23 1030       Patient will demonstrate functional cognitive-linguistic skills for return to discharge environment    [place] -- -- with use of compensatory strategies  -AL    Time frame -- -- by discharge  -AL    Progress/Outcomes -- -- good progress toward goal  -AL       Word Retrieval Skills Goal 1 (SLP)    Comment (Word Retrieval Goal 1, SLP) -- -- Pt answered a mock interview question with initial fillers \"um\" and revisions noted; however, he was then able to communicate a complex story clearly and answered follow-up questions from clinician with NO cues.  -AL       Memory Skills Goal 1 (SLP)    Improve Memory Skills Through Goal 1 (SLP) -- -- recalling unrelated word lists with an imposed delay;listen to a paragraph and answer questions;repeat list in sequential order;recall details from a word list;use memory strategies;90%;independently (over 90% accuracy)  -AL    Time Frame (Memory Skills Goal 1, SLP) -- -- 1 week  -AL    Progress/Outcomes " (Memory Skills Goal 1, SLP) -- -- good progress toward goal  -AL    Comment (Memory Skills Goal 1, SLP) -- -- 4 unit list retention task (word ordering): 20% with NO cues, 70% with repetition, 80% with MIN cues, 100% with MOD cues. Immediate memory for voicemails using note-taking strategy: 85% with NO cues, 100% with MIN cues.  -AL       Reasoning Goal 1 (SLP)    Improve Reasoning Through Goal 1 (SLP) complete high level reasoning task;80%;independently (over 90% accuracy)  -AL complete high level reasoning task;80%;independently (over 90% accuracy)  -AL --    Time Frame (Reasoning Goal 1, SLP) 1 week  -AL 1 week  -AL --    Progress/Outcomes (Reasoning Goal 1, SLP) good progress toward goal  -AL -- --    Comment (Reasoning Goal 1, SLP) Pt completed logic puzzle from previous session with overall 95% accuracy with NO cues, 100% with MIN cues.  -AL Reasoning for moderately complex logic puzzle: 100% with NO cues thus far. Pt received phone call cancelling an MD appointment during this task. He became upset and was unable to focus on completion of task. SLP contacted to nurse assist him with rescheduling his appointment.  -AL --       Functional Math Skills Goal 1 (SLP)    Improve Functional Math Skills Through Goal 1 (SLP) complete functional math task;90%;independently (over 90% accuracy)  -AL complete functional math task;90%;independently (over 90% accuracy)  -AL --    Time Frame (Functional Math Skills Goal 1, SLP) 1 week  -AL 1 week  -AL --    Progress/Outcomes (Functional Math Skills Goal 1, SLP) good progress toward goal  -AL good progress toward goal  -AL --    Comment (Functional Math Skills Goal 1, SLP) Home renovation math (calculating area): 60% with NO cues, 100% with MIN-MOD cues.  -AL Home renovation math (calculating area): 80% with NO cues, 100% with MIN cues. Mild impulsivity noted during this task.  -AL --       Executive Functional Skills Goal 1 (SLP)    Improve Executive Function Skills Goal 1  (SLP) identify strategies, strengths, limitations;home management activity;perform self-evaluation;90%;independently (over 90% accuracy)  -AL -- --    Time Frame (Executive Function Skills Goal 1, SLP) 1 week  -AL -- --    Progress/Outcomes (Executive Function Skills Goal 1, SLP) good progress toward goal  -AL -- --    Comment (Executive Function Skills Goal 1, SLP) Pt with good awareness of impact of emotions/anxiety on his performance during therapy task yesterday. Discussed strategies to assist, such as taking a break and managing emotions before returning to task.  -AL -- --              User Key  (r) = Recorded By, (t) = Taken By, (c) = Cosigned By      Initials Name Provider Type    Yoselyn Shah MS CCC-SLP Speech and Language Pathologist                                Time Calculation:        Time Calculation- SLP       Row Name 11/16/23 1330 11/16/23 1122          Time Calculation- SLP    SLP Start Time 1300  -AL 1030  -AL     SLP Stop Time 1330  -AL 1100  -AL     SLP Time Calculation (min) 30 min  -AL 30 min  -AL     SLP Received On 11/16/23  -AL 11/16/23  -AL               User Key  (r) = Recorded By, (t) = Taken By, (c) = Cosigned By      Initials Name Provider Type    Yoselyn Shah MS CCC-SLP Speech and Language Pathologist                      Therapy Charges for Today       Code Description Service Date Service Provider Modifiers Qty    13419369740 HC ST DEV OF COGN SKILLS INITIAL 15 MIN 11/15/2023 Yoselyn Gudino MS CCC-SLP  1    23833752267 HC ST DEV OF COGN SKILLS EACH ADDT'L 15 MIN 11/15/2023 Yoselyn Gudino MS CCC-SLP  3    [id number] HC ST DEV OF COGN SKILLS INITIAL 15 MIN 11/16/2023 Yoselyn Gudino MS CCC-SLP  1    [id number] HC ST DEV OF COGN SKILLS EACH ADDT'L 15 MIN 11/16/2023 Yoselyn Gudino MS CCC-SLP  3                             Yoselyn Gudino MS CCC-SLP  11/16/2023

## 2023-11-16 NOTE — PROGRESS NOTES
Inpatient Rehabilitation Plan of Care Note    Plan of Care  Care Plan Reviewed - Updates as Follows    Safety    [RN] Potential for Injury(Active)  Current Status(11/16/2023): Uses call light appropriately  Weekly Goal(11/22/2023): Cues to use call light  Discharge Goal: Items within reach    Performed Intervention(s)  Items within reach  Safety rounds  Bed alarm/chair alarm      Psychosocial    [RN] Behavior(Active)  Current Status(11/16/2023): Anxiety related to hx of strokes and fall  Weekly Goal(11/23/2023): Allow opportunity to express concers regarding current  status.  Discharge Goal: Demonstrate healthy oping strategies.    Performed Intervention(s)  Medication  verbalize needs and concerns      Sphincter Control    [RN] Bowel Management(Active)  Current Status(11/16/2023): continent 100%  Weekly Goal(11/22/2023): continet 100%  Discharge Goal: continent 100%    [RN] Bladder Management(Active)  Current Status(11/16/2023): continent 100%  Weekly Goal(11/23/2023): Continent 100%  Discharge Goal: Continent 100%    Performed Intervention(s)  Therapeutic exercises/modalities  Elimination schedule  Encourage appropriate diet    Signed by: Harini Espinal RN

## 2023-11-16 NOTE — PLAN OF CARE
Goal Outcome Evaluation: Pt resting in bed with no signs of distress noted at this time. Vs stable. Bed in lowest position with siderails up x2. Call light within reach. RN will continue to monitor.

## 2023-11-16 NOTE — THERAPY TREATMENT NOTE
Inpatient Rehabilitation - Physical Therapy Treatment Note       [facility]     Patient Name: Flako Coreas  : 1965  MRN: [id number]    Today's Date: 2023                    Admit Date: 2023      Visit Dx:     ICD-10-CM ICD-9-CM   1. Closed head injury with concussion, without loss of consciousness, subsequent encounter  S06.0X0D V58.89   2. 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    Acute CVA (cerebrovascular accident)    HTN (hypertension)    Late effect of cerebrovascular accident (CVA)    Concussion    Fall    Head injury, closed, with concussion    Alcohol abuse       Past Medical History:   Diagnosis Date    ADHD (attention deficit hyperactivity disorder)     On going issue    Anxiety     Lepe's esophagus     Benign prostatic hyperplasia Surgery in 2021    Clotting disorder Intestinal    Depression     Diverticulitis     Diverticulosis     Duodenitis     Enteritis     Failure to thrive (child)     Family history of blood clots     GERD (gastroesophageal reflux disease)     Hyperlipidemia     Hypertension     Hypertensive emergency     Low testosterone     Microcytosis     Pancreatitis     Pneumonia     PONV (postoperative nausea and vomiting)     Seasonal affective disorder     Shoulder injury     Stroke     Unexplained weight loss        Past Surgical History:   Procedure Laterality Date    COLON SURGERY      COLONOSCOPY      COLONOSCOPY N/A 2022    Procedure: COLONOSCOPY INTO CECUM WITH HOT SNARE POLYPECTOMY;   Surgeon: Albert Gallo MD;  Location: [facility] ENDOSCOPY;  Service: Gastroenterology;  Laterality: N/A;  PRE- GI BLEED  POST- DIVERTICULOSIS, POLYP    ENDOSCOPY N/A 12/10/2022    Procedure: ESOPHAGOGASTRODUODENOSCOPY;  Surgeon: Albert Gallo MD;  Location: [facility] ENDOSCOPY;  Service: Gastroenterology;  Laterality: N/A;  PRE- DARK STOOLS  POST- BARRETTS ESOPHAGUS    FRACTURE SURGERY  1980    for broken arm    FRACTURE SURGERY      for broken hand    INCISION AND DRAINAGE ABSCESS  2015    anal    PROSTATE SURGERY      SMALL INTESTINE SURGERY      TONSILLECTOMY         PT ASSESSMENT (last 12 hours)       IRF PT Evaluation and Treatment       Row Name 11/16/23 1108          PT Time and Intention    Document Type daily treatment;discharge evaluation  -MD     Mode of Treatment physical therapy  -MD     Patient/Family/Caregiver Comments/Observations Pt sitting in WC showing no signs of acute distress.  -MD       Row Name 11/16/23 1108          General Information    Existing Precautions/Restrictions fall  -MD       Row Name 11/16/23 1108          Pain Assessment    Pretreatment Pain Rating 0/10 - no pain  -MD       Row Name 11/16/23 1108          Cognition/Psychosocial    Orientation Status (Cognition) oriented x 4  -MD     Follows Commands (Cognition) follows one-step commands  -MD     Personal Safety Interventions fall prevention program maintained;gait belt  -MD       Row Name 11/16/23 1108          Bed Mobility    Rolling Left [place] (Bed Mobility) independent  -MD     Rolling Right [place] (Bed Mobility) independent  -MD     Supine-Sit [place] (Bed Mobility) independent  -MD     Sit-Supine [place] (Bed Mobility) independent  -MD       Row Name 11/16/23 1108          Bed-Chair Transfer    Bed-Chair [place] (Transfers) standby assist  -MD     Assistive Device (Bed-Chair Transfers) wheelchair  -MD       Row Name 11/16/23 1108          Chair-Bed Transfer    Chair-Bed [place] (Transfers)  standby assist  -MD     Assistive Device (Chair-Bed Transfers) wheelchair  -MD       Row Name 11/16/23 1108          Sit-Stand Transfer    Sit-Stand [place] (Transfers) standby assist  -MD     Assistive Device (Sit-Stand Transfers) wheelchair;walker, front-wheeled  -MD       Row Name 11/16/23 1108          Stand-Sit Transfer    Stand-Sit [place] (Transfers) standby assist  -MD     Assistive Device (Stand-Sit Transfers) wheelchair;walker, front-wheeled  -MD       Row Name 11/16/23 1108          Car Transfer    Type (Car Transfer) stand pivot/stand step  -MD     [place] Level (Car Transfer) contact guard  -MD     Assistive Device (Car Transfer) walker, front-wheeled  -MD       Row Name 11/16/23 1108          Gait/Stairs (Locomotion)    [place] Level (Gait) verbal cues;minimum assist (75% patient effort)  -MD     Assistive Device (Gait) walker, front-wheeled  -MD     Distance in Feet (Gait) 200'x1 80'x1  -MD     Deviations/Abnormal Patterns (Gait) gait speed decreased;base of support, narrow;scissoring;ataxic  -MD     Bilateral Gait Deviations forward flexed posture  -MD     Left Sided Gait Deviations foot drop/toe drag;forward flexed posture  inconsisten and can correct w VC  -MD     [place] Level (Stairs) verbal cues;contact guard  -MD     Handrail Location (Stairs) both sides  -MD     Number of Steps (Stairs) 12  -MD     Ascending Technique (Stairs) step-over-step  -MD     Descending Technique (Stairs) step-over-step  -MD       Row Name 11/16/23 1108          Curb Negotiation (Mobility)    [place], Curb Negotiation verbal cues;minimal assist, 75% or more patient effort  -MD     Assistive Device (Curb Negotiation) walker, front-wheeled  -MD       Row Name 11/16/23 1108          Balance    Static Standing Balance verbal cues;minimal assist  -MD     Dynamic Standing Balance verbal cues;minimal assist  -MD     Balance Interventions standing;static;dynamic;foam;core stability exercise;weight  shifting activity;combined head and eye movements  toe taps on foam mat 3 sets of 10  -MD       Row Name 11/16/23 1108          Hip (Therapeutic Exercise)    Hip Strengthening (Therapeutic Exercise) bilateral;aBduction;aDduction;marching while standing;mini squats;10 repetitions;standing  at // bars w B UE support  -MD       Row Name 11/16/23 1108          Ankle (Therapeutic Exercise)    Ankle Strengthening (Therapeutic Exercise) bilateral;dorsiflexion;plantarflexion;10 repetitions;standing  -MD       Row Name 11/16/23 1108          Positioning and Restraints    Pre-Treatment Position sitting in chair/recliner  -MD     Post Treatment Position bed  -MD     In Bed supine;call light within reach  -MD       Row Name 11/16/23 1108          Bed Mobility Goal 1 (PT-IRF)    Progress/Outcomes (Bed Mobility Goal 1, PT-IRF) goal met  -MD       Row Name 11/16/23 1108          Transfer Goal 1 (PT-IRF)    Progress/Outcomes (Transfer Goal 1, PT-IRF) goal not met  -MD       Row Name 11/16/23 1108          Gait/Walking Locomotion Goal 1 (PT-IRF)    Progress/Outcomes (Gait/Walking Locomotion Goal 1, PT-IRF) goal not met  -MD       Row Name 11/16/23 1108          Stairs Goal 1 (PT-IRF)    Progress/Outcomes (Stairs Goal 1, PT-IRF) goal not met  -MD       Row Name 11/16/23 1108          Discharge Summary (PT)    Discharge Summary Statement (PT) PT educated pt that son will need to be providing CGA w RWx when pt up and moving at home to decreased risk of falls and increase pt safety.  Pt states understanding.  -MD               User Key  (r) = Recorded By, (t) = Taken By, (c) = Cosigned By      Initials Name Provider Type    Trinity Dash PT Physical Therapist                     Physical Therapy Education       Title: PT OT SLP Therapies (Done)       Topic: Physical Therapy (Done)       Point: Mobility training (Done)       Learning Progress Summary             Patient Acceptance, E,D, JAY,DU by FINA at 11/15/2023 114    Acceptance, E,D,  VU,DU by DP at 11/8/2023 1551                         Point: Home exercise program (Done)       Learning Progress Summary             Patient Acceptance, E,D, VU,DU by DP at 11/8/2023 1551                         Point: Body mechanics (Done)       Learning Progress Summary             Patient Acceptance, E,D, VU,DU by DP at 11/8/2023 1551                         Point: Precautions (Done)       Learning Progress Summary             Patient Acceptance, E, VU by MD at 11/16/2023 1121    Acceptance, E, VU by MD at 11/14/2023 1606    Acceptance, E, VU by MD at 11/13/2023 0912    Acceptance, E, VU by MD at 11/11/2023 0927    Acceptance, E, VU by MD at 11/10/2023 1117    Acceptance, E, VU by MD at 11/9/2023 0840    Acceptance, E,D, VU,DU by DP at 11/8/2023 1551    Acceptance, E, VU by MD at 11/7/2023 1605                                         User Key       Initials Effective Dates Name Provider Type Discipline    MD 06/16/21 -  Trinity Velez, PT Physical Therapist PT    DP 08/24/21 -  Papo [place] PT Physical Therapist PT                    PT Recommendation and Plan    Frequency of Treatment (PT): 5 times per week, 60 minutes per session  Anticipated Equipment Needs at Discharge (PT Eval): front wheeled walker                  Time Calculation:      PT Charges       Row Name 11/16/23 1622 11/16/23 1108          Time Calculation    Start Time 1330  -MD 1100  -MD     Stop Time 1400  -MD 1130  -MD     Time Calculation (min) 30 min  -MD 30 min  -MD     PT Received On -- 11/16/23  -MD     PT - Next Appointment -- 11/17/23  -MD               User Key  (r) = Recorded By, (t) = Taken By, (c) = Cosigned By      Initials Name Provider Type    Trinity Dash, PT Physical Therapist                    Therapy Charges for Today       Code Description Service Date Service Provider Modifiers Qty    51876197974 HC GAIT TRAINING EA 15 MIN 11/16/2023 Trinity Velez, PT GP 1    00045839782 HC PT THERAPEUTIC ACT EA 15 MIN 11/16/2023 Trinity Velez,  PT GP 1    [id number]  PT THER PROC EA 15 MIN 11/16/2023 Trinity Velez, PT GP 2              PT G-Codes  AM-PAC 6 Clicks Score (PT): 24      Trinity Velez, PT  11/16/2023

## 2023-11-16 NOTE — PROGRESS NOTES
SECTION GG      Mobility Performance Discharge:     Roll Left and Right: Patient completed the activities by themself with no  assistance from a helper.   Sit to Lying: Patient completed the activities by themself with no assistance  from a helper.   Lying to Sitting on Side of Bed: Patient completed the activities by themself  with no assistance from a helper.   Sit to Stand: [place] provides verbal cues and/or touching/steadying and/or  contact guard assistance as patient completes activity. Assistance may be  provided throughout the activity or intermittently.   Chair/Bed to Chair Transfer: [place] provides verbal cues and/or  touching/steadying and/or contact guard assistance as patient completes  activity. Assistance may be provided throughout the activity or intermittently.   Car Transfer: [place] provides verbal cues and/or touching/steadying and/or  contact guard assistance as patient completes activity. Assistance may be  provided throughout the activity or intermittently.   Walk 10 Feet:   [place] provides verbal cues and/or touching/steadying and/or  contact guard assistance as patient completes activity. Assistance may be  provided throughout the activity or intermittently.  Walk 50 Feet with 2 Turns:   [place] provides verbal cues and/or  touching/steadying and/or contact guard assistance as patient completes  activity. Assistance may be provided throughout the activity or intermittently.  Walk 150 Feet:   [place] provides verbal cues and/or touching/steadying and/or  contact guard assistance as patient completes activity. Assistance may be  provided throughout the activity or intermittently.  Walking 10 Feet on Uneven Surfaces:   Not attempted due to medical or safety  concerns.  1 Step Over Curb or Up/Down Stair:   [place] does less than half the effort.  [place] lifts, holds or supports trunk or limbs but provides less than half the  effort.  4 Steps Up and Down, With/Without Rail:   [place] provides verbal  cues and/or  touching/steadying and/or contact guard assistance as patient completes  activity. Assistance may be provided throughout the activity or intermittently.  12 Steps Up and Down, With/Without Rail:   [place] provides verbal cues and/or  touching/steadying and/or contact guard assistance as patient completes  activity. Assistance may be provided throughout the activity or intermittently.  Picking up an Object:   Not attempted due to medical or safety concerns. Uses  Wheelchair and/or Scooter: No    Section J. Health Conditions (Pain):  Pain Interference with Therapy Activities:   Rarely or not at all  Pain Interference with Day-to-Day Activities:   Rarely or not at all    Signed by: Trinity Velez, PT

## 2023-11-16 NOTE — THERAPY PROGRESS REPORT/RE-CERT
Inpatient Rehabilitation - Occupational Therapy Treatment Note    [facility]     Patient Name: Flako Coreas  : 1965  MRN: [id number]    Today's Date: 2023                 Admit Date: 2023         ICD-10-CM ICD-9-CM   1. Closed head injury with concussion, without loss of consciousness, subsequent encounter  S06.0X0D V58.89   2. 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    Acute CVA (cerebrovascular accident)    HTN (hypertension)    Late effect of cerebrovascular accident (CVA)    Concussion    Fall    Head injury, closed, with concussion    Alcohol abuse       Past Medical History:   Diagnosis Date    ADHD (attention deficit hyperactivity disorder)     On going issue    Anxiety     Lepe's esophagus     Benign prostatic hyperplasia Surgery in 2021    Clotting disorder Intestinal    Depression     Diverticulitis     Diverticulosis     Duodenitis     Enteritis     Failure to thrive (child)     Family history of blood clots     GERD (gastroesophageal reflux disease)     Hyperlipidemia     Hypertension     Hypertensive emergency     Low testosterone     Microcytosis     Pancreatitis     Pneumonia     PONV (postoperative nausea and vomiting)     Seasonal affective disorder     Shoulder injury     Stroke     Unexplained weight loss        Past Surgical History:   Procedure Laterality Date    COLON SURGERY      COLONOSCOPY      COLONOSCOPY N/A 2022    Procedure: COLONOSCOPY INTO CECUM WITH HOT SNARE POLYPECTOMY;  Surgeon:  Albert Gallo MD;  Location: [facility] ENDOSCOPY;  Service: Gastroenterology;  Laterality: N/A;  PRE- GI BLEED  POST- DIVERTICULOSIS, POLYP    ENDOSCOPY N/A 12/10/2022    Procedure: ESOPHAGOGASTRODUODENOSCOPY;  Surgeon: Albert Gallo MD;  Location: [facility] ENDOSCOPY;  Service: Gastroenterology;  Laterality: N/A;  PRE- DARK STOOLS  POST- BARRETTS ESOPHAGUS    FRACTURE SURGERY  1980    for broken arm    FRACTURE SURGERY      for broken hand    INCISION AND DRAINAGE ABSCESS  2015    anal    PROSTATE SURGERY      SMALL INTESTINE SURGERY      TONSILLECTOMY               IRF OT ASSESSMENT FLOWSHEET (last 12 hours)       IRF OT Evaluation and Treatment       Row Name 11/16/23 1513 11/16/23 1218       OT Time and Intention    Document Type daily treatment  -CC daily treatment  -KA    Mode of Treatment occupational therapy  -CC individual therapy;occupational therapy  -KA    Patient Effort good  -CC good  -KA    Symptoms Noted During/After Treatment none  -CC none  -KA      Row Name 11/16/23 1218          General Information    Patient Profile Reviewed yes  -KA     Patient/Family/Caregiver Comments/Observations Seated EOB upon arrival for therapy  -KA     Existing Precautions/Restrictions fall  -KA       Row Name 11/16/23 1513 11/16/23 1218       Pain Assessment    Pretreatment Pain Rating 0/10 - no pain  -CC 0/10 - no pain  -KA    Posttreatment Pain Rating 0/10 - no pain  -CC 0/10 - no pain  -KA      Row Name 11/16/23 1513 11/16/23 1218       Cognition/Psychosocial    Affect/Mental Status (Cognition) WFL  -CC WFL  -KA    Orientation Status (Cognition) oriented x 4  -CC oriented x 4  -KA    Follows Commands (Cognition) follows one-step commands  -CC follows one-step commands  -KA    Personal Safety Interventions fall prevention program maintained;gait belt;nonskid shoes/slippers when out of bed  -CC fall prevention program maintained;gait belt;nonskid shoes/slippers when out of bed  -KA      Row Name 11/16/23 1513           Vision Assessment/Intervention    Visual Impairment/Limitations diplopia  wears eye patch  -CC       Row Name 11/16/23 1218          Bathing    Comment (Bathing) Requested not to shower this AM  -[facility] Name 11/16/23 1218          Upper Body Dressing    Comment (Upper Body Dressing) Already dressed seated EOB requesting to go to gym  -[facility] Name 11/16/23 1218          Lower Body Dressing    [place] Level (Lower Body Dressing) doff;don;pants/bottoms;shoes/slippers;underwear;socks;set up;standby assist;contact guard assist  -     Position (Lower Body Dressing) edge of bed sitting  -       Row Name 11/16/23 1218          Grooming    [place] Level (Grooming) grooming skills;deodorant application;oral care regimen;wash face, hands;set up;standby assist  -     Position (Grooming) sink side;supported standing  -     Set-up Assistance (Grooming) obtain supplies  -[facility] Name 11/16/23 1218          Functional Mobility    Functional Mobility- Ind. Level set up required;contact guard assist  -     [facility]al [facility]- [facility]     Functional Mobility- Comment Performed functional mobility from his bed to the bathroom and back in room to his w/c with rwx  -[facility] Name 11/16/23 1218          Sit-Stand Transfer    Sit-Stand [place] (Transfers) standby assist  -     Assistive Device (Sit-Stand Transfers) wheelchair;[facility] 11/16/23 1218          Stand-Sit Transfer    Stand-Sit [place] (Transfers) standby assist  [facility]     Assistive Device (Stand-Sit Transfers) wheelchair;walker, front-wheeled  -KA       Row Name 11/16/23 1218          Shoulder (Therapeutic Exercise)    Shoulder Strengthening (Therapeutic Exercise) bilateral;flexion;extension;aBduction;aDduction;horizontal aBduction/aDduction;sitting;15 repititions;3 sets  6# weight. Wipsterw machine 4 sets of 15 reps 20# each [facility]  -[facility] Name 11/16/23 1218           Elbow/Forearm (Therapeutic Exercise)    Elbow/Forearm Strengthening (Therapeutic Exercise) bilateral;flexion;extension;supination;pronation;sitting;15 repititions;3 sets  6#  -BIBI       Row Name 11/16/23 1218          Wrist (Therapeutic Exercise)    Wrist Strengthening (Therapeutic Exercise) bilateral;flexion;extension;15 repititions;3 sets  6#  -BIBI       Row Name 11/16/23 1513 11/16/23 1218       Balance    Static Standing Balance standby assist  -CC --    Dynamic Standing Balance contact guard;minimal assist  -CC --    Balance Interventions weight shifting activity;UE activity with balance activity;combined head and eye movements  stood at counter w unilateral support at reached overhead and placedpegs in foam board secured on cabinet. Pt more anxiuos w reaching overhead and need vc to remain calm and slow down.  -CC UE activity with balance activity;dynamic;moderate challenge  Pt performed functional mobility within gym reaching for rings while using Rwx for support. Required min-mod A for balance at times while reaching for rings. Did better with therapists cues to slow down movements and take his time to control his balance.  -[facility] Name 11/16/23 1513 11/16/23 1218       Positioning and Restraints    Pre-Treatment Position in bed  -CC in bed  -KA    Post Treatment Position wheelchair  -CC bed  -KA    In Bed -- supine;call light within reach;encouraged to call for assist;exit alarm on  -KA    In Wheelchair sitting;exit alarm on;with PT  -CC --      Row Name 11/16/23 1513          Transfer Goal 1 (OT-IRF)    Progress/Outcomes (Transfer Goal 1, OT-IRF) goal met  -[facility] Name 11/16/23 1513          Transfer Goal 2 (OT-IRF)    Progress/Outcomes (Transfer Goal 2, OT-IRF) continuing progress toward goal  -       Row Name 11/16/23 1513          Bathing Goal 1 (OT-IRF)    Progress/Outcomes (Bathing Goal 1, OT-IRF) continuing progress toward goal  -[facility] Name 11/16/23 1513          LB Dressing  Goal 1 (OT-IRF)    Progress/Outcomes (LB Dressing Goal 1, OT-IRF) continuing progress toward goal  -CC       Row Name 11/16/23 1513          Toileting Goal 1 (OT-IRF)    Progress/Outcomes (Toileting Goal 1, OT-IRF) continuing progress toward goal  -CC       Row Name 11/16/23 1513          Strength Goal 1 (OT-IRF)    Progress/Outcomes (Strength Goal 1, OT-IRF) continuing progress toward goal  -CC       Row Name 11/16/23 1513          Balance Goal 1 (OT)    Progress/Outcomes (Balance Goal 1, OT) continuing progress toward goal  -CC       Row Name 11/16/23 1513          Balance Goal 2 (OT)    Progress/Outcome (Balance Goal 2, OT) continuing progress toward goal  -CC       Row Name 11/16/23 1513          Coordination Goal 1 (OT)    Progress/Outcomes (Coordination Goal 1, OT) goal met  -CC       Row Name 11/16/23 1513          Caregiver Training Goal 1 (OT-IRF)    Progress/Outcomes (Caregiver Training Goal 1, OT-IRF) continuing progress toward goal  -               User Key  (r) = Recorded By, (t) = Taken By, (c) = Cosigned By      Initials Name Effective Dates    CC Rosa Chris, OTR 06/16/21 -     KA Crystal Ortiz, OT 09/22/22 -                      Occupational Therapy Education       Title: PT OT SLP Therapies (Done)       Topic: Occupational Therapy (Done)       Point: ADL training (Done)       Description:   Instruct learner(s) on proper safety adaptation and remediation techniques during self care or transfers.   Instruct in proper use of assistive devices.                  Learning Progress Summary             Patient Acceptance, E, VU by CC at 11/7/2023 4740    Comment: Explanation of OT services, evaluation results and goal setting                         Point: Home exercise program (Done)       Description:   Instruct learner(s) on appropriate technique for monitoring, assisting and/or progressing therapeutic exercises/activities.                  Learning Progress Summary             Patient  Acceptance, E, VU by  at 11/7/2023 1649    Comment: Explanation of OT services, evaluation results and goal setting                         Point: Precautions (Done)       Description:   Instruct learner(s) on prescribed precautions during self-care and functional transfers.                  Learning Progress Summary             Patient Acceptance, E, VU by  at 11/7/2023 1649    Comment: Explanation of OT services, evaluation results and goal setting                         Point: Body mechanics (Done)       Description:   Instruct learner(s) on proper positioning and spine alignment during self-care, functional mobility activities and/or exercises.                  Learning Progress Summary             Patient Acceptance, E, VU by  at 11/7/2023 1649    Comment: Explanation of OT services, evaluation results and goal setting                                         User Key       Initials Effective Dates Name Provider Type Discipline     06/16/21 -  Rosa Chris OTR Occupational Therapist OT                        OT Recommendation and Plan    Planned Therapy Interventions (OT): activity tolerance training, adaptive equipment training, BADL retraining, functional balance retraining, neuromuscular control/coordination retraining, strengthening exercise, transfer/mobility retraining, patient/caregiver education/training                    Time Calculation:      Time Calculation- OT       Row Name 11/16/23 1235 11/16/23 0800          Time Calculation- OT    OT Start Time 0800  -KA 1230  -CC     OT Stop Time 0830  -KA 1300  -CC     OT Time Calculation (min) 30 min  -KA 30 min  -CC               User Key  (r) = Recorded By, (t) = Taken By, (c) = Cosigned By      Initials Name Provider Type    CC Rosa Chris OTR Occupational Therapist    Crystal Enrique OT Occupational Therapist                  Therapy Charges for Today       Code Description Service Date Service Provider Modifiers Qty     76199159646 HC OT NEUROMUSC RE EDUCATION EA 15 MIN 11/15/2023 Rosa Chris OTR GO 2    93984950043 HC OT SELF CARE/MGMT/TRAIN EA 15 MIN 11/15/2023 Rosa Chris OTR GO 2    68424916556 HC OT NEUROMUSC RE EDUCATION EA 15 MIN 11/16/2023 Rosa Chris OTR GO 2                     BEATA Mejia  11/16/2023

## 2023-11-16 NOTE — PROGRESS NOTES
SECTION GG      Self Care Performance Discharge:   Oral Hygiene: [place] sets up or cleans up; patient completes activity. [place]  assists only prior to or following the activity.   Toileting Hygiene: : [place] provides verbal cues and/or touching/steadying  and/or contact guard assistance as patient completes activity.   Shower/Bathe Self: [place] provides verbal cues and/or touching/steadying and/or  contact guard assistance as patient completes activity.   Upper Body Dressing: [place] sets up or cleans up; patient completes activity.  [place] assists only prior to or following the activity.   Lower Body Dressing: [place] provides verbal cues and/or touching/steadying  and/or contact guard assistance as patient completes activity.   Putting On/Taking Off Footwear: [place] sets up or cleans up; patient completes  activity. [place] assists only prior to or following the activity.    Mobility Toilet Transfer Discharge: [place] provides verbal cues or  touching/steadying assistance as patient completes activity.    Signed by: Rosa Chris OTR/SAMMY

## 2023-11-16 NOTE — PROGRESS NOTES
"[facility]    Patient Name: Flako Coreas  :1965  58 y.o.  LOS: 10  Encounter Provider: Pk Worley Jr, MD      Patient Care Team:  Akash Rodriguez Jr., DO as PCP - General (Sports Medicine)    Chief Complaint: Follow-up subacute stroke, hypertension, dyslipidemia    Interval History: No acute issues overnight.  Fair blood pressure control.       Objective   Vital Signs  Temp:  [97.8 °F (36.6 °C)-99.1 °F (37.3 °C)] 97.8 °F (36.6 °C)  Heart Rate:  [61-69] 61  Resp:  [18] 18  BP: (133-176)/() 133/81    Intake/Output Summary (Last 24 hours) at 2023 0920  Last data filed at 2023 0555  Gross per 24 hour   Intake 820 ml   Output 800 ml   Net 20 ml     Flowsheet Rows      Flowsheet Row First Filed Value   Admission Height 188 cm (74\") Documented at 2023 1505   Admission Weight 105 kg (232 lb 8 oz) Documented at 2023 1505              Vitals reviewed.   Constitutional:       Appearance: Healthy appearance. Not in distress.   Neck:      Vascular: No JVR. JVD normal.   Pulmonary:      Effort: Pulmonary effort is normal.      Breath sounds: Normal breath sounds. No wheezing. No rhonchi. No rales.   Chest:      Chest wall: Not tender to palpatation.   Cardiovascular:      PMI at left midclavicular line. Normal rate. Regular rhythm. Normal S1. Normal S2.       Murmurs: There is no murmur.      No gallop.  No click. No rub.   Pulses:     Intact distal pulses.   Edema:     Peripheral edema absent.   Abdominal:      General: Bowel sounds are normal.      Palpations: Abdomen is soft.      Tenderness: There is no abdominal tenderness.   Musculoskeletal: Normal range of motion.         General: No tenderness. Skin:     General: Skin is warm and dry.   Neurological:      General: No focal deficit present.      Mental Status: Alert and oriented to person, place and time.           Pertinent Test Results:  Results from last 7 days   Lab Units 23  0623 " "11/13/23  0831   SODIUM mmol/L 141 144   POTASSIUM mmol/L 3.6 3.4*   CHLORIDE mmol/L 105 105   CO2 mmol/L 25.9 26.0   BUN mg/dL 12 12   CREATININE mg/dL 1.00 1.11   GLUCOSE mg/dL 106* 127*   CALCIUM mg/dL 9.2 9.6         Results from last 7 days   Lab Units 11/16/23  0623 11/13/23  0831   WBC 10*3/mm3 6.73 7.90   HEMOGLOBIN g/dL 12.8* 13.9   HEMATOCRIT % 38.5 41.7   PLATELETS 10*3/mm3 276 356                   Invalid input(s): \"LDLCALC\"                Medication Review:   amLODIPine, 5 mg, Oral, Daily  aspirin, 81 mg, Oral, Daily  atorvastatin, 80 mg, Oral, Nightly  carvedilol, 6.25 mg, Oral, BID With Meals  folic acid, 1 mg, Oral, Daily  hydrALAZINE, 100 mg, Oral, Q8H  hydroCHLOROthiazide Oral, 12.5 mg, Oral, Daily  lisinopril, 40 mg, Oral, Daily  melatonin, 10 mg, Oral, Nightly  multivitamin with minerals, 1 tablet, Oral, Daily  pantoprazole, 40 mg, Oral, Q AM  senna-docusate sodium, 2 tablet, Oral, BID  spironolactone, 25 mg, Oral, Daily  thiamine, 100 mg, Oral, Daily  ticagrelor, 60 mg, Oral, BID  vilazodone, 40 mg, Oral, Daily              Assessment & Plan     Active Hospital Problems    Diagnosis  POA    **Concussion [S06.0XAA]  Yes      Resolved Hospital Problems   No resolved problems to display.        Subacute stroke -came in with episode of syncope.  Sounded like a vagal episode although he states that it seemed similar to the symptoms associated with his previous stroke.  MEGHANN was performed on hospitalization for stroke in August which showed no cardiac source of embolism.  He had a Holter monitor that showed no arrhythmia however it was only for 1 day.  We will end up sending him home with a 7-day Holter monitor to exclude arrhythmia.  Monitor clinical progress during physical therapy and inpatient rehab.  Hypertension -seems well controlled on current medical regimen with spironolactone restarted yesterday.  Serum creatinine and potassium are within normal range.  Dyslipidemia  History of being " nonresponsive to Plavix therapy  GERD/Lepe's esophagus      Pk Worley Jr, MD  [facility]  11/16/23  09:20 EST

## 2023-11-16 NOTE — PROGRESS NOTES
" LOS: 10 days   Patient Care Team:  Akash Rodriguez Jr., DO as PCP - General (Sports Medicine)      NIMESH ROCHA  1965      ADMITTING DIAGNOSIS:    Status post concussion  History of CVA-aspirin/Brilinta/statin    Subjective     Overall strength about the same.  Tolerating therapies.  He wishes to look at discharge home tomorrow    Objective     Vitals:    11/16/23 0534   BP: 133/81   Pulse: 61   Resp: 18   Temp: 97.8 °F (36.6 °C)   SpO2: 94%       Intake/Output Summary (Last 24 hours) at 11/16/2023 0801  Last data filed at 11/16/2023 0555  Gross per 24 hour   Intake 820 ml   Output 800 ml   Net 20 ml     PHYSICAL EXAM:    MENTAL STATUS -  AWAKE / ALERT  HEENT-      LUNGS - normal resp  HEART- RRR   ABD - Nondistended   EXT - NO EDEMA OR CYANOSIS  NEURO -oriented x4     MOTOR EXAM - RUE/RLE 5/5. LUE/LLE 5/5.        MEDICATIONS  Scheduled Meds:amLODIPine, 5 mg, Oral, Daily  aspirin, 81 mg, Oral, Daily  atorvastatin, 80 mg, Oral, Nightly  carvedilol, 6.25 mg, Oral, BID With Meals  folic acid, 1 mg, Oral, Daily  hydrALAZINE, 100 mg, Oral, Q8H  hydroCHLOROthiazide Oral, 12.5 mg, Oral, Daily  lisinopril, 40 mg, Oral, Daily  melatonin, 10 mg, Oral, Nightly  multivitamin with minerals, 1 tablet, Oral, Daily  pantoprazole, 40 mg, Oral, Q AM  senna-docusate sodium, 2 tablet, Oral, BID  spironolactone, 25 mg, Oral, Daily  thiamine, 100 mg, Oral, Daily  ticagrelor, 60 mg, Oral, BID  vilazodone, 40 mg, Oral, Daily      Continuous Infusions:   PRN Meds:.  acetaminophen    senna-docusate sodium **AND** polyethylene glycol **AND** bisacodyl **AND** bisacodyl    hydrOXYzine    influenza vaccine      RESULTS  No results found for: \"POCGLU\"  Results from last 7 days   Lab Units 11/16/23  0623 11/13/23  0831   WBC 10*3/mm3 6.73 7.90   HEMOGLOBIN g/dL 12.8* 13.9   HEMATOCRIT % 38.5 41.7   PLATELETS 10*3/mm3 276 356     Results from last 7 days   Lab Units 11/16/23  0623 11/13/23  0831   SODIUM mmol/L 141 144   POTASSIUM " mmol/L 3.6 3.4*   CHLORIDE mmol/L 105 105   CO2 mmol/L 25.9 26.0   BUN mg/dL 12 12   CREATININE mg/dL 1.00 1.11   CALCIUM mg/dL 9.2 9.6   GLUCOSE mg/dL 106* 127*           ASSESSMENT and PLAN    Concussion    Status post concussion     History of CVA-history of 15 x 6 mm  infarct in the inferior medial  left cerebellum and a tiny 3 mm infarct in the posterior left cerebellum  aspirin/Brilinta/statin     Impaired mobility  Impaired self-care  Impaired cognition    Visual tcrmlkiisc-njexnons-lmmmncdae eye patch.  May possibly benefit from neuro optometry evaluation as an outpatient     Hypertension-amlodipine/carvedilol/hydralazine/lisinopril  Nov 9 - -162/91-97. Previously on spironlactone or hydrochlorothiazide. Will add hydrochlorothiazide 12.5 mg daily with KDUR 10 meq daily.  November 13-has some variability to his blood pressure range-follow pattern.  November 15-systolic blood pressure in the 150s on multiple medications-  will get input from cardiology service      Hyperlipidemia     Gastroesophageal reflux disease     Depression-Viibryd     DVT prophylaxis-on dual antiplatelet therapy.  SCDs     GI bleed-evaluated by gastroenterology-Plan is to wait 3 months before he can come off of Brilinta to undergo possible colonoscopy.     TEAM CONF - NOV 9 - TRANSFERS CTG. 4 STAIRS CTG. GAIT 160 FEET RW CTG.   BATH MIN CTG. LBD MIN. UBD SBA. GROOMING SBA. TOILETING MIN. TOILET AND SHOWER TRANSFERS MIN.   Anxiety related to hx of strokes and fall . CONTINENT BOWEL AND BLADDER.   ATTENTION - Mildly impaired attention and visuospatial skills on CLQT.  Moderately impaired memory.  EXPRESSION - Mild anomia   ELOS - TWO WEEKS.     BNE (Active)-November 15, 2023  Att'n. - Mildly imp.  Exec. fx. - Mildly Imp., appropriate insight  Rsng/Jgmnt - WNL  Visuospatial Skills - WNL  Visual Mem. - Mildly Imp.  Verbal Mem. - Mod. Imp. for learning, Mildly Imp. for delayed recall  Emot - Pt acknowledged both dep and anxiety, mostly  related to concern about  functional status. Pt denied suicidal ideation.    TEAM CONF - NOV 16 - BED SUP. TRANSFERS CTG. GAIT CTG, ATAXIC, 160 FEET WITH DECREASED CONTROL OF FOOT PLACEMENT. 4 STAIRS CTG. OKAY FOR 20 STEPS WITH ASSISTANCE. TOILET TRANSFERS SBA. SHOWER TRANSFERS CTG SBA. BATH SBA. LBD SBA. UBD SET UP. GROOMING SBA IF STANDING. CUING FOR TECHNIQUES WHICH DECREASE ATAXIA KICKING IN, DOES BETTER WITH SLOWER MOVEMENT. MODERATE VERBAL MEMORY DEFICITS NOTED ON BNE.   ELOS - PATIENT REQUESTS DISCHARGE HOME TOMORROW. SON TO TAKE FMLA. OUTPATIENT PT, OT, SLP       Goal is for home with home health   therapies.  Barrier to discharge:.  Mobility and self-care- work on strength balance transfers gait actives of daily living to overcome.          Pk Snow MD      Appropriate PPE was worn during the entire visit.  Hand hygiene was completed before and after.

## 2023-11-16 NOTE — PROGRESS NOTES
Reviewed progress and goals with patient after team conference. Also, spoke with patient's son the phone. Patient is scheduled to d/c on 11/17 and patient's son feels comfortable with taking care of him at home. Patient's son did not think coming into rehab unit for teaching or a conference was necessary. SW let son know that patient needs a hand on him when he is up moving around at all times for balance issues. SW also let son know that he would need to assist patient with finances and medication management after d/c. Son in agreement and informed that his FMLA would begin on Monday, 11/20 and he has up to 3 months. Patient would like to continue outpatient PT/OT/ST at [facility] after d/c and son can transport.

## 2023-11-16 NOTE — PLAN OF CARE
Goal Outcome Evaluation:  Plan of Care Reviewed With: patient        Progress: improving  Outcome Evaluation: No c/o pain. Ambulates CGA with walker. Meds with thin liquids. Continent bowel and bladder. Using call light.

## 2023-11-16 NOTE — PROGRESS NOTES
"Section C. BIMS  Brief Interview for Mental Status (BIMS) was conducted.  Repetition of Three Words: Three words  Able to report correct year: Correct  Able to report correct month: Accurate within 5 days  Able to report correct day of the week: Correct  Able to recall \"sock\": Yes, no cue required  Able to recall \"blue\": Yes, no cue required  Able to recall \"bed\": Yes, no cue required    BIMS SUMMARY SCORE: 15 Cognitively intact    Section C. Signs and Symptoms of Delirium (from CAM)  Acute Change in Mental Status:   No  Inattention:   Behavior not present  Disorganized Thinking:   Behavior not present  Altered Level of Consciousness:   Behavior not present    Signed by: Yoselyn Gudino, SLP    "

## 2023-11-16 NOTE — PROGRESS NOTES
Case Management  Inpatient Rehabilitation Team Conference    Conference Date/Time: 11/16/2023 7:57:30 AM    Team Conference Attendees:  MD Elle Dai, [facility], PT  SERA Mejia, [facility]  THEA Frances, Psychologist  Rocio Diaz RN    Demographics            Age: 58Y            Gender: Male    Admission Date: 11/6/2023 3:04:00 PM  Rehabilitation Diagnosis:  Status post concussion  History of CVA  Past Medical History: Past Medical History:  Diagnosis Date  ? ADHD (attention deficit hyperactivity disorder)    On going issue  ? Anxiety  ? Lepe's esophagus  ? Benign prostatic hyperplasia Surgery in 12/2021  ? Clotting disorder Intestinal  ? Depression  ? Diverticulitis  ? Diverticulosis  ? Duodenitis  ? Enteritis  ? Failure to thrive (child)  ? Family history of blood clots  ? GERD (gastroesophageal reflux disease)  ? Hyperlipidemia  ? Hypertension  ? Hypertensive emergency  ? Low testosterone  ? Microcytosis  ? Pancreatitis  ? Pneumonia  ? PONV (postoperative nausea and vomiting)  ? Seasonal affective disorder  ? Shoulder injury  ? Stroke  ? Unexplained weight loss        Surgical History    Past Surgical History:  Procedure Laterality Date  ? COLON SURGERY  ? COLONOSCOPY  ? COLONOSCOPY N/A 12/11/2022    Procedure: COLONOSCOPY INTO CECUM WITH HOT SNARE POLYPECTOMY;  Surgeon:  Albert Gallo MD;  Location: [facility] ENDOSCOPY;  Service: Gastroenterology;  Laterality: N/A;  PRE- GI BLEED  POST- DIVERTICULOSIS, POLYP  ? ENDOSCOPY N/A 12/10/2022    Procedure: ESOPHAGOGASTRODUODENOSCOPY;  Surgeon: Albert Gallo MD;  Location: [facility] ENDOSCOPY;  Service: Gastroenterology;  Laterality: N/A;  PRE-  DARK STOOLS  POST- BARRETTS ESOPHAGUS  ? FRACTURE SURGERY   1980    for broken arm  ? FRACTURE SURGERY    for broken hand  ? INCISION AND DRAINAGE ABSCESS   2015    anal  ? PROSTATE SURGERY  ? SMALL INTESTINE  SURGERY  ? TONSILLECTOMY      Plan of Care  Anticipated Discharge Date/Estimated Length of Stay: ELOS: 2 weeks  Anticipated Discharge Destination: Community discharge with assistance  Discharge Plan : Home with 24/7 assistance of son as needed.  Medical Necessity Expected Level Rationale: Goals are to achieve a level of  supervision with  mobility and self-care and improved balance.   Rehabilitation  prognosis fair.  Medical prognosis fair.  Intensity and Duration: an average of 3 hours/5 days per week  Medical Supervision and 24 Hour Rehab Nursing: x  Physical Therapy: x  PT Intensity/Duration: 1 hour / day, 5 days / week, for approximately 2 weeks  Occupational Therapy: x  OT Intensity/Duration: 1 hour / day, 5 days / week, for approximately 2 weeks  Speech and Language Therapy: x  SLP Intensity/Duration: 1 hour / day, 5 days / week, for approximately 2 weeks  Social Work: x  Therapeutic Recreation: x  Psychology: x  Registered Dietician: x  Updated (if changes indicated)    Anticipated Discharge Date/Estimated Length of Stay:   ELOS:11/17 if family  prepared for discharge    Based on the patient's medical and functional status, their prognosis and  expected level of functional improvement is: Goals are to achieve a level of  supervision with  mobility and self-care and improved balance.   Rehabilitation  prognosis fair.  Medical prognosis fair.      Interdisciplinary Problem/Goals/Status    All Rehab Problems:  Safety    [RN] Potential for Injury(Active)  Current Status(11/16/2023): Uses call light appropriately  Weekly Goal(11/22/2023): Cues to use call light  Discharge Goal: Items within reach        Psychosocial    [RN] Behavior(Active)  Current Status(11/16/2023): Anxiety related to hx of strokes and fall  Weekly Goal(11/23/2023): Allow opportunity to express concers regarding current  status.  Discharge Goal: Demonstrate healthy oping strategies.        Sphincter Control    [RN] Bowel  Management(Active)  Current Status(11/16/2023): continent 100%  Weekly Goal(11/22/2023): continet 100%  Discharge Goal: continent 100%    [RN] Bladder Management(Active)  Current Status(11/16/2023): continent 100%  Weekly Goal(11/23/2023): Continent 100%  Discharge Goal: Continent 100%        Self Care    [OT] Bathing(Active)  Current Status(11/15/2023): SBA  Weekly Goal(11/20/2023): SBA/Mod I  Discharge Goal: SBA/Mod I    [OT] Dressing (Lower)(Active)  Current Status(11/15/2023): SBA  Weekly Goal(11/21/2023): SBA/Mod I  Discharge Goal: SBa/Mod I    [OT] Dressing (Upper)(Active)  Current Status(11/15/2023): set up  Weekly Goal(11/20/2023): set up/Mod I  Discharge Goal: set up/Mod I    [OT] Eating(Active)  Current Status(11/13/2023): I  Weekly Goal(11/13/2023): I  Discharge Goal: I    [OT] Grooming(Active)  Current Status(11/13/2023): set up  Weekly Goal(11/20/2023): set up/Mod I  Discharge Goal: Set up/Mod I    [OT] Toileting(Active)  Current Status(11/15/2023): SBA  Weekly Goal(11/21/2023): MOD I  Discharge Goal: MOd I        Mobility    [OT] Toilet Transfers(Active)  Current Status(11/15/2023): SBA w vc  Weekly Goal(11/20/2023): SBA/MOD I  Discharge Goal: SBA/Mod I    [OT] Tub/Shower Transfers(Active)  Current Status(11/15/2023): CGA/SBA  Weekly Goal(11/21/2023): SBA  Discharge Goal: SBA    [PT] Bed/Chair/Wheelchair(Active)  Current Status(11/15/2023): CGA/SBA  Weekly Goal(11/23/2023): SBA  Discharge Goal: SUP    [PT] Bed Mobility(Active)  Current Status(11/15/2023): SUP  Weekly Goal(11/23/2023): TABITHA  Discharge Goal: IND    [PT] Stairs(Active)  Current Status(11/15/2023): 4 CGA  Weekly Goal(11/23/2023): PT ONLY  Discharge Goal: 12 supervison    [PT] Walk(Active)  Current Status(11/15/2023): 160 FWW CGA  Weekly Goal(11/23/2023): PT ONLY  Discharge Goal: 160' with FWW and Supervision        Cognition    [ST] Attention(Active)  Current Status(11/15/2023): Requires MIN-MOD cues to complete high-level  mental  manipulation tasks. Requires occasional MIN cues for attention to detail.  Weekly Goal(11/23/2023): Will complete high-level mental manipulation tasks with  MIN-NO cues.  Discharge Goal: Improved attention skills for home environment.        Communication    [ST] Expression(Active)  Current Status(11/15/2023): Improved word finding skills. Exhibits occasional  delays in complex conversation.  Weekly Goal(11/23/2023): Will complete word retrieval tasks with NO cues.  Discharge Goal: Will participate in complex conversation with NO cues for  compensations.        Comments: 11/9 Sup bed mob, CGA transfers, can do 4 stairs CGA, amb 160' CGA  FWW.  Min A toilet transfer, Min A shower transfer.   Min A LBD, Min / CGA  bathing.  SBA grooming.  Min A toileting.  Mildly impaired attention and  visuospatial skills on CLQT.  Moderately impaired memory.  Mild anomia.  Cont  bowel and blader.    11/16 CGA / SBA transfers, completed 4 stairs CGA, amb 160' FWW CGA.  SBA w/ vc  for toilet transfer, CGA / SBA shower transfer.  SBA ADL's.  Improved word  finding skills.  Min - Mod cues to complete high level mental manipulation  tasks.  Occasional min cues for attention to detail.  Cont bowel and bladder.    Signed by: Ijeoma Thakkar RN    Physician CoSigned By: Pk Snow 11/16/2023 08:13:45

## 2023-11-17 VITALS
HEART RATE: 63 BPM | HEIGHT: 74 IN | WEIGHT: 232.5 LBS | DIASTOLIC BLOOD PRESSURE: 82 MMHG | SYSTOLIC BLOOD PRESSURE: 147 MMHG | BODY MASS INDEX: 29.84 KG/M2 | TEMPERATURE: 99.1 F | RESPIRATION RATE: 18 BRPM | OXYGEN SATURATION: 93 %

## 2023-11-17 PROCEDURE — 97535 SELF CARE MNGMENT TRAINING: CPT | Performed by: OCCUPATIONAL THERAPIST

## 2023-11-17 PROCEDURE — 97129 THER IVNTJ 1ST 15 MIN: CPT

## 2023-11-17 PROCEDURE — 97130 THER IVNTJ EA ADDL 15 MIN: CPT

## 2023-11-17 RX ADMIN — HYDROCHLOROTHIAZIDE 12.5 MG: 12.5 TABLET ORAL at 10:01

## 2023-11-17 RX ADMIN — TICAGRELOR 60 MG: 60 TABLET ORAL at 11:58

## 2023-11-17 RX ADMIN — AMLODIPINE BESYLATE 5 MG: 5 TABLET ORAL at 10:01

## 2023-11-17 RX ADMIN — VILAZODONE HYDROCHLORIDE 40 MG: 40 TABLET, FILM COATED ORAL at 10:01

## 2023-11-17 RX ADMIN — HYDRALAZINE HYDROCHLORIDE 100 MG: 50 TABLET, FILM COATED ORAL at 06:30

## 2023-11-17 RX ADMIN — PANTOPRAZOLE SODIUM 40 MG: 40 TABLET, DELAYED RELEASE ORAL at 06:30

## 2023-11-17 RX ADMIN — HYDROXYZINE HYDROCHLORIDE 50 MG: 25 TABLET ORAL at 08:58

## 2023-11-17 RX ADMIN — ASPIRIN 81 MG: 81 TABLET, COATED ORAL at 10:09

## 2023-11-17 RX ADMIN — SPIRONOLACTONE 25 MG: 25 TABLET, FILM COATED ORAL at 10:01

## 2023-11-17 RX ADMIN — HYDROXYZINE HYDROCHLORIDE 50 MG: 25 TABLET ORAL at 00:01

## 2023-11-17 RX ADMIN — CARVEDILOL 6.25 MG: 6.25 TABLET, FILM COATED ORAL at 10:01

## 2023-11-17 RX ADMIN — LISINOPRIL 40 MG: 40 TABLET ORAL at 10:01

## 2023-11-17 RX ADMIN — MULTIPLE VITAMINS W/ MINERALS TAB 1 TABLET: TAB at 10:01

## 2023-11-17 RX ADMIN — FOLIC ACID 1 MG: 1 TABLET ORAL at 10:01

## 2023-11-17 RX ADMIN — Medication 100 MG: at 10:01

## 2023-11-17 NOTE — PROGRESS NOTES
Inpatient Rehabilitation Plan of Care Note    Plan of Care  Care Plan Reviewed - No updates at this time.    Safety    Performed Intervention(s)  Items within reach  Safety rounds  Bed alarm/chair alarm      Psychosocial    Performed Intervention(s)  Medication  verbalize needs and concerns      Sphincter Control    Performed Intervention(s)  Therapeutic exercises/modalities  Elimination schedule  Encourage appropriate diet    Signed by: Harini Espinal RN

## 2023-11-17 NOTE — DISCHARGE INSTR - APPOINTMENTS
[facility]- Outpatient PT/OT/ST  [address] [place] [address]  [phone]    *Initial appt:  Wed, 11/22/23   2:30-3:15 pm- OT  3:15-4:00 pm- PT  4:00-4:45 pm- ST              Mon, 11/27/23  9:30-10:15 am- PT  10:15-11:00 am- OT  11:00-11:45 am- ST

## 2023-11-17 NOTE — PROGRESS NOTES
"    Patient Name: Flako Coreas  :1965  58 y.o.      Patient Care Team:  Aksah Rodriguez Jr.,  as PCP - General (Sports Medicine)    Chief Complaint: follow up elevated blood pressure     Interval History:    Excellent BP control over last 24 hours. He feels well.     Objective   Vital Signs  Temp:  [97.8 °F (36.6 °C)-98 °F (36.7 °C)] 98 °F (36.7 °C)  Heart Rate:  [57-67] 57  Resp:  [18] 18  BP: (110-132)/(62-84) 110/62    Intake/Output Summary (Last 24 hours) at 2023 0938  Last data filed at 2023 0800  Gross per 24 hour   Intake 840 ml   Output 1925 ml   Net -1085 ml     Flowsheet Rows      Flowsheet Row First Filed Value   Admission Height 188 cm (74\") Documented at 2023 1505   Admission Weight 105 kg (232 lb 8 oz) Documented at 2023 1505            Physical Exam:   General Appearance:    Alert, cooperative, in no acute distress   Lungs:     Clear to auscultation.  Normal respiratory effort and rate.      Heart:    Regular rhythm and normal rate, normal S1 and S2, no murmurs, gallops or rubs.     Chest Wall:    No abnormalities observed   Abdomen:     Soft, nontender, positive bowel sounds.     Extremities:   no cyanosis, clubbing or edema.  No marked joint deformities.  Adequate musculoskeletal strength.       Results Review:    Results from last 7 days   Lab Units 23  0623   SODIUM mmol/L 141   POTASSIUM mmol/L 3.6   CHLORIDE mmol/L 105   CO2 mmol/L 25.9   BUN mg/dL 12   CREATININE mg/dL 1.00   GLUCOSE mg/dL 106*   CALCIUM mg/dL 9.2         Results from last 7 days   Lab Units 23  0623   WBC 10*3/mm3 6.73   HEMOGLOBIN g/dL 12.8*   HEMATOCRIT % 38.5   PLATELETS 10*3/mm3 276                           Medication Review:   amLODIPine, 5 mg, Oral, Daily  aspirin, 81 mg, Oral, Daily  atorvastatin, 80 mg, Oral, Nightly  carvedilol, 6.25 mg, Oral, BID With Meals  folic acid, 1 mg, Oral, Daily  hydrALAZINE, 100 mg, Oral, Q8H  hydroCHLOROthiazide Oral, 12.5 mg, Oral, " Daily  lisinopril, 40 mg, Oral, Daily  melatonin, 10 mg, Oral, Nightly  multivitamin with minerals, 1 tablet, Oral, Daily  pantoprazole, 40 mg, Oral, Q AM  senna-docusate sodium, 2 tablet, Oral, BID  spironolactone, 25 mg, Oral, Daily  thiamine, 100 mg, Oral, Daily  ticagrelor, 60 mg, Oral, BID  vilazodone, 40 mg, Oral, Daily              Assessment & Plan   Sub acute cerebellar stroke - MEGHANN and 2 week monitor unremarkable at that time.   Hypertension ,  well controlled with the addition of spironolactone. Labs are stable.   Hyperlipidemia  Plavix nonresponder  GERD with history of Lepe's esophagus.     Excellent BP control. Discharge plans noted. Keep appointment with Dr. Taylor on 12/22/2023.     JANEEN Lara  [facility]  11/17/23  09:38 EST

## 2023-11-17 NOTE — PROGRESS NOTES
Inpatient Rehabilitation Plan of Care Note    Plan of Care  Care Plan Reviewed - No updates at this time.    Safety    Performed Intervention(s)  Items within reach  Safety rounds  Bed alarm/chair alarm      Psychosocial    Performed Intervention(s)  Medication  verbalize needs and concerns      Sphincter Control    Performed Intervention(s)  Therapeutic exercises/modalities  Elimination schedule  Encourage appropriate diet    Services:  Total Billed: 0 minutes (Timed: 0, Untimed: 0)  0.00 Untimed: [] STAT_OT No Charge    Signed by: Martin Marshall RN

## 2023-11-17 NOTE — PROGRESS NOTES
SECTION GG      Self Care Performance Discharge:   Oral Hygiene: [place] sets up or cleans up; patient completes activity. [place]  assists only prior to or following the activity.   Toileting Hygiene: : [place] provides verbal cues and/or touching/steadying  and/or contact guard assistance as patient completes activity.   Shower/Bathe Self: [place] provides verbal cues and/or touching/steadying and/or  contact guard assistance as patient completes activity.   Upper Body Dressing: Patient completed the activities by themself with no  assistance from a helper.   Lower Body Dressing: [place] provides verbal cues and/or touching/steadying  and/or contact guard assistance as patient completes activity.   Putting On/Taking Off Footwear: [place] sets up or cleans up; patient completes  activity. [place] assists only prior to or following the activity.    Mobility Toilet Transfer Discharge: [place] provides verbal cues or  touching/steadying assistance as patient completes activity.    Signed by: Divya Giron OTR/SAMMY

## 2023-11-17 NOTE — THERAPY DISCHARGE NOTE
Inpatient Rehabilitation - IRF Occupational Therapy Treatment Note/Discharge  [facility]     Patient Name: Flako Coreas  : 1965  MRN: [id number]  Today's Date: 2023               Admit Date: 2023       ICD-10-CM ICD-9-CM   1. Closed head injury with concussion, without loss of consciousness, subsequent encounter  S06.0X0D V58.89   2. 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    Acute CVA (cerebrovascular accident)    HTN (hypertension)    Late effect of cerebrovascular accident (CVA)    Concussion    Fall    Head injury, closed, with concussion    Alcohol abuse     Past Medical History:   Diagnosis Date    ADHD (attention deficit hyperactivity disorder)     On going issue    Anxiety     Lepe's esophagus     Benign prostatic hyperplasia Surgery in 2021    Clotting disorder Intestinal    Depression     Diverticulitis     Diverticulosis     Duodenitis     Enteritis     Failure to thrive (child)     Family history of blood clots     GERD (gastroesophageal reflux disease)     Hyperlipidemia     Hypertension     Hypertensive emergency     Low testosterone     Microcytosis     Pancreatitis     Pneumonia     PONV (postoperative nausea and vomiting)     Seasonal affective disorder     Shoulder injury     Stroke     Unexplained weight loss      Past Surgical History:   Procedure Laterality Date    COLON SURGERY      COLONOSCOPY      COLONOSCOPY N/A 2022    Procedure: COLONOSCOPY INTO CECUM WITH HOT SNARE POLYPECTOMY;  Surgeon:  Albert Gallo MD;  Location: [facility] ENDOSCOPY;  Service: Gastroenterology;  Laterality: N/A;  PRE- GI BLEED  POST- DIVERTICULOSIS, POLYP    ENDOSCOPY N/A 12/10/2022    Procedure: ESOPHAGOGASTRODUODENOSCOPY;  Surgeon: Albert Gallo MD;  Location: [facility] ENDOSCOPY;  Service: Gastroenterology;  Laterality: N/A;  PRE- DARK STOOLS  POST- BARRETTS ESOPHAGUS    FRACTURE SURGERY  1980    for broken arm    FRACTURE SURGERY      for broken hand    INCISION AND DRAINAGE ABSCESS  2015    anal    PROSTATE SURGERY      SMALL INTESTINE SURGERY      TONSILLECTOMY         IRF OT ASSESSMENT FLOWSHEET (last 12 hours)       IRF OT Evaluation and Treatment       Row Name 11/17/23 1446          OT Time and Intention    Document Type discharge evaluation  -KP     Mode of Treatment individual therapy;occupational therapy  -KP     Patient Effort good  -KP     Symptoms Noted During/After Treatment none  -KP       Row Name 11/17/23 1446          General Information    Patient/Family/Caregiver Comments/Observations pt in bed  -KP     Existing Precautions/Restrictions fall  -KP       Row Name 11/17/23 1446          Pain Assessment    Pretreatment Pain Rating 0/10 - no pain  -KP     Posttreatment Pain Rating 0/10 - no pain  -KP       Row Name 11/17/23 1446          Cognition/Psychosocial    Affect/Mental Status (Cognition) WNL  -KP     Orientation Status (Cognition) oriented x 4  -KP     Follows Commands (Cognition) follows one-step commands;over 90% accuracy  -KP     Personal Safety Interventions fall prevention program maintained;gait belt;nonskid shoes/slippers when out of bed;muscle strengthening facilitated  -KP       Row Name 11/17/23 1446          Strength (Manual Muscle Testing)    Left Hand, Setting 1 (Dynamometer Testing) 85  -KP     Right Hand, Setting 1 (Dynamometer Testing) 105  -KP     Left Hand: Lateral (Key) Pinch Strength (Pinch Dynamometer Testing) 9  -KP     Left Hand: [place] Pinch Strength (Pinch  Dynamometer Testing) 9  -KP     Right Hand: Lateral (Key) Pinch Strength (Pinch Dynamometer Testing) 15  -KP     Right Hand: [place]) Pinch Strength (Pinch Dynamometer Testing) 18  -[facility] Name 11/17/23 1446          Left Shoulder (Manual Muscle Testing)    MMT, Gross Movement: Left Shoulder Flexion (5/5) normal  -[facility] Name 11/17/23 1446          Left Elbow/Forearm (Manual Muscle Testing)    MMT, Gross Movement: Left Elbow Flexion (5/5) normal  -[facility] Name 11/17/23 1446          Left Wrist (Manual Muscle Testing)    MMT, Gross Movement: Left Wrist Flexion (4+/5) good plus  -[facility] Name 11/17/23 1446          Vision Assessment/Intervention    Visual Impairment/Limitations diplopia  -     Vision Assessment Comment wears eye patch alternating eyes  -[facility] Name 11/17/23 1446          Sensory Assessment (Somatosensory)    Sensory Assessment (Somatosensory) UE sensation intact  -KP       Row Name 11/17/23 1446          Bathing    [place] Level (Bathing) bathing skills;lower body;upper body;standby assist;set up  -     Assistive Device (Bathing) grab bar/tub rail;hand held shower spray hose;shower chair  -     Position (Bathing) supported sitting;supported standing  -KP       Row Name 11/17/23 1446          Upper Body Dressing    [place] Level (Upper Body Dressing) doff;upper body dressing skills;don;pull over garment;modified independence  -     Position (Upper Body Dressing) supported sitting  -     Set-up Assistance (Upper Body Dressing) obtain clothing  -KP       Row Name 11/17/23 1446          Lower Body Dressing    [place] Level (Lower Body Dressing) doff;don;pants/bottoms;shoes/slippers;socks;underwear;standby assist  -     Position (Lower Body Dressing) supported sitting;supported standing  -     Set-up Assistance (Lower Body Dressing) obtain clothing  -KP       Row Name 11/17/23 1446          Grooming    [place] Level (Grooming)  grooming skills;deodorant application;oral care regimen;wash face, hands;set up;standby assist  -     Position (Grooming) sink side;supported standing  -       Row Name 11/17/23 1446          Toileting    [place] Level (Toileting) toileting skills;adjust/manage clothing;perform perineal hygiene;set up assistance;supervision  -     Position (Toileting) supported standing;supported sitting  -     Comment (Toileting) walker w him  -       Row Name 11/17/23 1446          Bed Mobility    Supine-Sit [place] (Bed Mobility) independent  -     Sit-Supine [place] (Bed Mobility) independent  -       Row Name 11/17/23 1446          Functional Mobility    Functional Mobility- Ind. Level set up required;contact guard assist  [facility]     Functional Mobility- Device walker, front-wheeled  -     Functional Mobility- Comment in room, to BR, to chair, to bed  -[facility] Name 11/17/23 1446          Sit-Stand Transfer    Sit-Stand [place] (Transfers) set up;standby assist  -     Assistive Device (Sit-Stand Transfers) wheelchair;walker, front-wheeled  -[facility] Name 11/17/23 1446          Stand-Sit Transfer    Stand-Sit [place] (Transfers) standby assist;set up  -     Assistive Device (Stand-Sit Transfers) walker, front-wheeled  -[facility] Name 11/17/23 1446          Toilet Transfer    Type (Toilet Transfer) stand-sit;sit-stand;stand pivot/stand step  -     [place] Level (Toilet Transfer) set up;standby assist  -     Assistive Device (Toilet Transfer) grab bars/safety frame;walker, front-wheeled  -[facility] Name 11/17/23 1446          Shower Transfer    Type (Shower Transfer) stand-sit;sit-stand;stand pivot/stand step  -     [place] Level (Shower Transfer) set up;contact guard  -     Assistive Device (Shower Transfer) grab bar, tub/shower;walker, front-wheeled;shower chair  -       Row Name 11/17/23 1446          Motor Skills    Results, 9 Hole Peg Test of Fine  Motor Coordination R pegs 19 sec L pegs 22 sec Rblocks 73 L blocks 59  -KP       Row Name 11/17/23 1446          Balance    Static Sitting Balance independent  -     Dynamic Sitting Balance standby assist  -KP     Position, Sitting Balance sitting in chair  -KP     Static Standing Balance standby assist  -KP     Dynamic Standing Balance contact guard  -KP     Position/Device Used, Standing Balance walker, front-wheeled  -KP       Row Name 11/17/23 1446          Positioning and Restraints    Pre-Treatment Position in bed  -KP     Post Treatment Position bed  -KP     In Bed supine;call light within reach;encouraged to call for assist;exit alarm on  -KP       Row Name 11/17/23 1446          Transfer Goal 2 (OT-IRF)    Activity/Assistive Device (Transfer Goal 2, OT-IRF) toilet  -     [place] Level (Transfer Goal 2, OT-IRF) modified independence  -KP     Time Frame (Transfer Goal 2, OT-IRF) long-term goal (LTG);by discharge  -     Progress/Outcomes (Transfer Goal 2, OT-IRF) goal not met  -       Row Name 11/17/23 1446          Bathing Goal 1 (OT-IRF)    Activity/Device (Bathing Goal 1, OT-IRF) bathing skills, all  -KP     [place] Level (Bathing Goal 1, OT-IRF) modified independence  -KP     Time Frame (Bathing Goal 1, OT-IRF) long-term goal (LTG);by discharge  -     Progress/Outcomes (Bathing Goal 1, OT-IRF) goal not met  pt SBA close to mod I. for safety someone needs to be near by  -       Row Name 11/17/23 1446          LB Dressing Goal 1 (OT-IRF)    Activity/Device (LB Dressing Goal 1, OT-IRF) lower body dressing  -     [place] (LB Dressing Goal 1, OT-IRF) modified independence  -KP     Time Frame (LB Dressing Goal 1, OT-IRF) long-term goal (LTG);by discharge  -     Progress/Outcomes (LB Dressing Goal 1, OT-IRF) goal partially met  mod I but for safety someone has to be near by to provide supervision when up  -KP       Row Name 11/17/23 1446          Toileting Goal 1 (OT-IRF)     Activity/Device (Toileting Goal 1, OT-IRF) toileting skills, all  -KP     [place] Level (Toileting Goal 1, OT-IRF) modified independence  -KP     Progress/Outcomes (Toileting Goal 1, OT-IRF) goal partially met  pt can do all of his toileting but someone near by when standing due to ataxic at times  -KP     Time Frame (Toileting Goal 1, OT-IRF) long-term goal (LTG)  -KP       Row Name 11/17/23 1446          Strength Goal 1 (OT-IRF)    Strength Goal 1 (OT-IRF) incraese LUE strength 1/2 grade to assist w daily tasks  -KP     Time Frame (Strength Goal 1, OT-IRF) long-term goal (LTG)  -KP     Progress/Outcomes (Strength Goal 1, OT-IRF) goal met  -KP       Row Name 11/17/23 1446          Balance Goal 1 (OT)    Activity/Assistive Device (Balance Goal 1, OT) standing, static  -KP     [place] Level/Cues Needed (Balance Goal 1, OT) supervision required  -KP     Time Frame (Balance Goal 1, OT) 1 week;short term goal (STG)  -KP     Progress/Outcomes (Balance Goal 1, OT) goal met  -KP       Row Name 11/17/23 1446          Balance Goal 2 (OT)    Activity/Assistive Device (Balance Goal 2, OT) standing, dynamic  -KP     [place] Level (Balance Goal 2, OT) conditional independence  -KP     Time Frame (Balance Goal 2, OT) long term goal (LTG);by discharge  -KP     Progress/Outcome (Balance Goal 2, OT) goal not met  -KP       Row Name 11/17/23 1446          Caregiver Training Goal 1 (OT-IRF)    Caregiver Training Goal 1 (OT-IRF) Pt I w home safety and HEP  -KP     Time Frame (Caregiver Training Goal 1, OT-IRF) long-term goal (LTG);by discharge  -KP     Progress/Outcomes (Caregiver Training Goal 1, OT-IRF) goal met  -KP       Row Name 11/17/23 1446          Discharge Summary (OT)    Outcomes Achieved Upon Discharge (OT) goals partially achieved prior to discharge  -KP     Discharge Summary Statement (OT) pt ed on safety w ADL and tsf and to have someone w her providing SBA for safety as pt sometimes requires CGA for  balance when standing. pt ed on tsf technique and pt demo all. pt dc home w son, HEP, and out pt therapy and supervision.and pt has a tub bench.  -KP     Transfer to Another Level of Care or Facility (OT) home with assist recommended;home with outpatient therapy services recommended  home w supervision and out pt.  -               User Key  (r) = Recorded By, (t) = Taken By, (c) = Cosigned By      Initials Name Effective Dates    ANTOINETTE Giron Divya Oneal, OTR 07/11/23 -                        Occupational Therapy Education       Title: PT OT SLP Therapies (Done)       Topic: Occupational Therapy (Resolved)       Point: ADL training (Resolved)       Description:   Instruct learner(s) on proper safety adaptation and remediation techniques during self care or transfers.   Instruct in proper use of assistive devices.                  Learning Progress Summary             Patient Acceptance, E,TB,D, DU,VU by  at 11/17/2023 1458    Comment: ed pt on safety w tsf and ADls pt demo w SBA to CGA for walking for safety due to ataxic at times. pt to dc home w supervision and out pt OT    Acceptance, E, VU by  at 11/7/2023 1649    Comment: Explanation of OT services, evaluation results and goal setting                         Point: Home exercise program (Resolved)       Description:   Instruct learner(s) on appropriate technique for monitoring, assisting and/or progressing therapeutic exercises/activities.                  Learning Progress Summary             Patient Acceptance, E,TB,D, DU,VU by  at 11/17/2023 0078    Comment: ed pt on safety w tsf and ADls pt demo w SBA to CGA for walking for safety due to ataxic at times. pt to dc home w supervision and out pt OT    Acceptance, E, VU by  at 11/7/2023 8814    Comment: Explanation of OT services, evaluation results and goal setting                         Point: Precautions (Resolved)       Description:   Instruct learner(s) on prescribed precautions during  self-care and functional transfers.                  Learning Progress Summary             Patient Acceptance, E,TB,D, DU,VU by  at 11/17/2023 1458    Comment: ed pt on safety w tsf and ADls pt demo w SBA to CGA for walking for safety due to ataxic at times. pt to dc home w supervision and out pt OT    Acceptance, E, VU by  at 11/7/2023 1649    Comment: Explanation of OT services, evaluation results and goal setting                         Point: Body mechanics (Resolved)       Description:   Instruct learner(s) on proper positioning and spine alignment during self-care, functional mobility activities and/or exercises.                  Learning Progress Summary             Patient Acceptance, E,TB,D, DU,VU by  at 11/17/2023 5988    Comment: ed pt on safety w tsf and ADls pt demo w SBA to CGA for walking for safety due to ataxic at times. pt to dc home w supervision and out pt OT    Acceptance, E, VU by  at 11/7/2023 3183    Comment: Explanation of OT services, evaluation results and goal setting                                         User Key       Initials Effective Dates Name Provider Type Discipline     06/16/21 -  Rosa Chris, OTR Occupational Therapist OT     07/11/23 -  Divya Giron, OTR Occupational Therapist OT                    OT Recommendation and Plan  Planned Therapy Interventions (OT): activity tolerance training, adaptive equipment training, BADL retraining, functional balance retraining, neuromuscular control/coordination retraining, strengthening exercise, transfer/mobility retraining, patient/caregiver education/training    Daily Progress Summary (OT)  Overall Progress Toward Functional Goals (OT): progressing toward functional goals as expected      OT IRF GOALS       Row Name 11/17/23 1446 11/16/23 1513 11/07/23 1651       Transfer Goal 1 (OT-IRF)    Activity/Assistive Device (Transfer Goal 1, OT-IRF) -- -- shower chair;toilet  -    [place] Level (Transfer Goal  1, OT-IRF) -- -- contact guard required;standby assist  -CC    Time Frame (Transfer Goal 1, OT-IRF) -- -- short-term goal (STG);1 week  -CC    Progress/Outcomes (Transfer Goal 1, OT-IRF) -- goal met  -CC continuing progress toward goal  -CC       Transfer Goal 2 (OT-IRF)    Activity/Assistive Device (Transfer Goal 2, OT-IRF) toilet  -KP -- toilet  -CC    [place] Level (Transfer Goal 2, OT-IRF) modified independence  -KP -- modified independence  -CC    Time Frame (Transfer Goal 2, OT-IRF) long-term goal (LTG);by discharge  -KP -- long-term goal (LTG);by discharge  -CC    Progress/Outcomes (Transfer Goal 2, OT-IRF) goal not met  -KP continuing progress toward goal  -CC continuing progress toward goal  -CC       Bathing Goal 1 (OT-IRF)    Activity/Device (Bathing Goal 1, OT-IRF) bathing skills, all  -KP -- bathing skills, all  -CC    [place] Level (Bathing Goal 1, OT-IRF) modified independence  -KP -- modified independence  -CC    Time Frame (Bathing Goal 1, OT-IRF) long-term goal (LTG);by discharge  -KP -- long-term goal (LTG);by discharge  -CC    Progress/Outcomes (Bathing Goal 1, OT-IRF) goal not met  pt SBA close to mod I. for safety someone needs to be near by  -KP continuing progress toward goal  -CC continuing progress toward goal  -CC       LB Dressing Goal 1 (OT-IRF)    Activity/Device (LB Dressing Goal 1, OT-IRF) lower body dressing  -KP -- lower body dressing  -CC    [place] (LB Dressing Goal 1, OT-IRF) modified independence  -KP -- modified independence  -CC    Time Frame (LB Dressing Goal 1, OT-IRF) long-term goal (LTG);by discharge  -KP -- long-term goal (LTG);by discharge  -CC    Progress/Outcomes (LB Dressing Goal 1, OT-IRF) goal partially met  mod I but for safety someone has to be near by to provide supervision when up  -KP continuing progress toward goal  -CC continuing progress toward goal  -CC       Toileting Goal 1 (OT-IRF)    Activity/Device (Toileting Goal 1, OT-IRF) toileting  skills, all  -KP -- toileting skills, all  -CC    [place] Level (Toileting Goal 1, OT-IRF) modified independence  -KP -- modified independence  -CC    Progress/Outcomes (Toileting Goal 1, OT-IRF) goal partially met  pt can do all of his toileting but someone near by when standing due to ataxic at times  -KP continuing progress toward goal  -CC continuing progress toward goal  -CC    Time Frame (Toileting Goal 1, OT-IRF) long-term goal (LTG)  -KP -- long-term goal (LTG);by discharge  -CC       Strength Goal 1 (OT-IRF)    Strength Goal 1 (OT-IRF) incraese LUE strength 1/2 grade to assist w daily tasks  -KP -- [place] LUE strength 1/2 grade to assist w daily tasks  -CC    Time Frame (Strength Goal 1, OT-IRF) long-term goal (LTG)  -KP -- long-term goal (LTG)  -CC    Progress/Outcomes (Strength Goal 1, OT-IRF) goal met  -KP continuing progress toward goal  -CC continuing progress toward goal  -CC       Balance Goal 1 (OT)    Activity/Assistive Device (Balance Goal 1, OT) standing, static  -KP -- standing, static  -CC    [place] Level/Cues Needed (Balance Goal 1, OT) supervision required  -KP -- supervision required  -CC    Time Frame (Balance Goal 1, OT) 1 week;short term goal (STG)  -KP -- 1 week;short term goal (STG)  -CC    Progress/Outcomes (Balance Goal 1, OT) goal met  -KP continuing progress toward goal  -CC continuing progress toward goal  -CC       Balance Goal 2 (OT)    Activity/Assistive Device (Balance Goal 2, OT) standing, dynamic  -KP -- standing, dynamic  -CC    [place] Level (Balance Goal 2, OT) conditional independence  -KP -- conditional independence  -CC    Time Frame (Balance Goal 2, OT) long term goal (LTG);by discharge  -KP -- long term goal (LTG);by discharge  -CC    Progress/Outcome (Balance Goal 2, OT) goal not met  -KP continuing progress toward goal  -CC continuing progress toward goal  -CC       Caregiver Training Goal 1 (OT-IRF)    Caregiver Training Goal 1 (OT-IRF) Pt I w  home safety and HEP  -KP -- Pt I w home safety and HEP  -CC    Time Frame (Caregiver Training Goal 1, OT-IRF) long-term goal (LTG);by discharge  -KP -- long-term goal (LTG);by discharge  -CC    Progress/Outcomes (Caregiver Training Goal 1, OT-IRF) goal met  -KP continuing progress toward goal  -CC continuing progress toward goal  -CC              User Key  (r) = Recorded By, (t) = Taken By, (c) = Cosigned By      Initials Name Provider Type    CC Rosa Chris OTR Occupational Therapist    Divya Murillo OTR Occupational Therapist                        Time Calculation:    Time Calculation- OT       Row Name 11/17/23 1459             Time Calculation- OT    OT Start Time 0800  -      OT Stop Time 0830  -      OT Time Calculation (min) 30 min  -                User Key  (r) = Recorded By, (t) = Taken By, (c) = Cosigned By      Initials Name Provider Type    Divya Murillo OTR Occupational Therapist                    Therapy Charges for Today       Code Description Service Date Service Provider Modifiers Qty    [id number] HC OT SELF CARE/MGMT/TRAIN EA 15 MIN 11/17/2023 Divya Giron OTR GO 2                      BEATA Tijerina  11/17/2023

## 2023-11-17 NOTE — DISCHARGE SUMMARY
[facility] - REHABILITATION UNIT    NIMESH ROCHA  1965    ADMIT DATE:  11/6/2023  3:04 PM  DISCHARGE DATE:  11/17/23      CHIEF COMPLAINT:    Status post concussion  History of CVA-aspirin/Brilinta/statin  Impaired mobility  Impaired self-care  Hypertension-   Hyperlipidemia  Gastroesophageal reflux disease  Depression-Viibryd  DVT prophylaxis-on dual antiplatelet therapy.  SCDs    HISTORY OF PRESENT ILLNESS:      Patient is a 58-year-old male who admitted to [facility] on October 26, 2023.  He has a past medical history including but not limited to hypertension, hyperlipidemia, GERD and recent CVA presented to [facility] with dizziness and a fall.  Patient reported that he was getting out of the bathtub, felt dizzy that he described as a spinning sensation.  Also, patient report associated balance difficulty with coordination and diplopia.  Stated he fell and hit his head on the floor.    Stated that he has been having symptoms since his recent stroke back in August.  Reported left sided facial and left hand numbness.  Stated that he is having difficulty with short-term memory.    Per chart review patient was admitted to the hospital from August 16 - August 24 and at that time was found to be hypertensive and had an acute CVA and was placed on Plavix and discharged home.  Then again patient presented to the ED on 10/23/2023 complaining of left-sided facial numbness as well as some left hand numbness and was admitted to the hospital.  Patient had a CTA of his head and neck that showed no evidence of hemorrhagic conversion, there is delayed flow to the inferior medial left cerebral hemisphere in the region of the infarction without area of diminished cerebral blood flow.  These findings are similar to the one on 8/19/2023.The left post PICA vertebral artery is very small. There is atherosclerotic disease involving the intracranial segment right vertebral artery and  the basilar artery with mild to  moderate narrowing. Moderate narrowing origin of the right vertebral artery.     Patient was felt to have sustained a closed head injury likely concussion from fall.  MRI was negative for new stroke.  Continue on statin, aspirin, Brilinta.  Had minimal rectal bleeding.  Evaluated by gastroenterology.  Hemoglobin remained stable.  Plan is to wait 3 months before he can come off of Brilinta to undergo possible colonoscopy.  No further bleeding reported.  Blood pressure has been controlled.     Given his functional impairments and comorbidities he is now admitted for acute inpatient rehabilitation     He complains of difficulty organizing his thoughts and completing his thoughts.  Complains of decreased balance.  Does not describe any focal weakness.  Fatigue continues to be an issue  He had impairments with his mobility and self-care  HOSPITAL COURSE:    Patient participated in a comprehensive acute inpatient rehabilitation program.     During the hospital stay the following areas were addressed:      Status post concussion     History of CVA-history of 15 x 6 mm  infarct in the inferior medial  left cerebellum and a tiny 3 mm infarct in the posterior left cerebellum  aspirin/Brilinta/statin     Impaired mobility  Impaired self-care  Impaired cognition     Visual bvyuuahriz-czfncysh-xwjspihxj eye patch.  May possibly benefit from neuro optometry evaluation as an outpatient-appointment December 5     Hypertension-amlodipine/carvedilol/hydralazine/lisinopril  Nov 9 - -162/91-97. Previously on spironlactone or hydrochlorothiazide. Will add hydrochlorothiazide 12.5 mg daily with KDUR 10 meq daily.  November 13-has some variability to his blood pressure range-follow pattern.  November 15-systolic blood pressure in the 150s on multiple medications-  will get input from cardiology service      Hyperlipidemia     Gastroesophageal reflux disease     Depression-Viibryd     DVT  prophylaxis-on dual antiplatelet therapy.  SCDs     GI bleed-evaluated by gastroenterology-Plan is to wait 3 months before he can come off of Brilinta to undergo possible colonoscopy.      TEAM CONF - NOV 9 - TRANSFERS CTG. 4 STAIRS CTG. GAIT 160 FEET RW CTG.   BATH MIN CTG. LBD MIN. UBD SBA. GROOMING SBA. TOILETING MIN. TOILET AND SHOWER TRANSFERS MIN.   Anxiety related to hx of strokes and fall . CONTINENT BOWEL AND BLADDER.   ATTENTION - Mildly impaired attention and visuospatial skills on CLQT.  Moderately impaired memory.  EXPRESSION - Mild anomia   ELOS - TWO WEEKS.      BNE (Active)-November 15, 2023  Att'n. - Mildly imp.  Exec. fx. - Mildly Imp., appropriate insight  Rsng/Jgmnt - WNL  Visuospatial Skills - WNL  Visual Mem. - Mildly Imp.  Verbal Mem. - Mod. Imp. for learning, Mildly Imp. for delayed recall  Emot - Pt acknowledged both dep and anxiety, mostly related to concern about  functional status. Pt denied suicidal ideation.     TEAM CONF - NOV 16 - BED SUP. TRANSFERS CTG. GAIT CTG, ATAXIC, 160 FEET WITH DECREASED CONTROL OF FOOT PLACEMENT. 4 STAIRS CTG. OKAY FOR 20 STEPS WITH ASSISTANCE. TOILET TRANSFERS SBA. SHOWER TRANSFERS CTG SBA. BATH SBA. LBD SBA. UBD SET UP. GROOMING SBA IF STANDING. CUING FOR TECHNIQUES WHICH DECREASE ATAXIA KICKING IN, DOES BETTER WITH SLOWER MOVEMENT. MODERATE VERBAL MEMORY DEFICITS NOTED ON BNE.   ELOS - PATIENT REQUESTS DISCHARGE HOME TOMORROW. SON TO TAKE FMLA. OUTPATIENT PT, OT, SLP        Goal is for home with home health   therapies.  Barrier to discharge:.  Mobility and self-care- work on strength balance transfers gait actives of daily living to overcome.      Disposition-November 17-patient progressed with rehabilitation program.  Request discharge home today with assistance from his son.  Medically stable.  Discharge home with outpatient therapies      Physical Exam near the time of discharge:    MENTAL STATUS -  AWAKE / ALERT  HEENT-      LUNGS - normal  "resp  HEART- RRR   ABD - Nondistended   EXT - NO EDEMA OR CYANOSIS  NEURO -oriented x4     MOTOR EXAM - RUE/RLE 5/5. LUE/LLE 5/5.         RESULTS:  No results found for: \"POCGLU\"  Results from last 7 days   Lab Units 11/16/23  0623 11/13/23  0831   WBC 10*3/mm3 6.73 7.90   HEMOGLOBIN g/dL 12.8* 13.9   HEMATOCRIT % 38.5 41.7   PLATELETS 10*3/mm3 276 356     Results from last 7 days   Lab Units 11/16/23  0623 11/13/23  0831   SODIUM mmol/L 141 144   POTASSIUM mmol/L 3.6 3.4*   CHLORIDE mmol/L 105 105   CO2 mmol/L 25.9 26.0   BUN mg/dL 12 12   CREATININE mg/dL 1.00 1.11   CALCIUM mg/dL 9.2 9.6   GLUCOSE mg/dL 106* 127*              Discharge Medications        New Medications        Instructions Start Date   acetaminophen 325 MG tablet  Commonly known as: TYLENOL   650 mg, Oral, Every 6 Hours PRN      folic acid 1 MG tablet  Commonly known as: FOLVITE   1 mg, Oral, Daily      hydroCHLOROthiazide 12.5 MG tablet  Commonly known as: HYDRODIURIL   12.5 mg, Oral, Daily      melatonin 5 MG tablet tablet   5 mg, Oral, Nightly      One-Daily Multi-Vitamin tablet tablet  Generic drug: multivitamin   1 tablet, Oral, Daily      pantoprazole 40 MG EC tablet  Commonly known as: PROTONIX   40 mg, Oral, Every Early Morning      Senexon-S 8.6-50 MG per tablet  Generic drug: sennosides-docusate   2 tablets, Oral, 2 Times Daily      thiamine 100 MG tablet  Commonly known as: VITAMIN B1   100 mg, Oral, Daily             Continue These Medications        Instructions Start Date   amLODIPine 5 MG tablet  Commonly known as: NORVASC   5 mg, Oral, Daily      aspirin 81 MG EC tablet   81 mg, Oral, Daily      atorvastatin 80 MG tablet  Commonly known as: LIPITOR   80 mg, Oral, Nightly      Brilinta 60 MG tablet tablet  Generic drug: ticagrelor   60 mg, Oral, 2 Times Daily      carvedilol 6.25 MG tablet  Commonly known as: COREG   6.25 mg, Oral, 2 Times Daily With Meals      hydrALAZINE 100 MG tablet  Commonly known as: APRESOLINE   100 mg, " Oral, Every 8 Hours Scheduled      hydrOXYzine 25 MG tablet  Commonly known as: ATARAX   25 mg, Oral, 3 Times Daily PRN      lisinopril 40 MG tablet  Commonly known as: PRINIVIL,ZESTRIL   40 mg, Oral, Daily      spironolactone 25 MG tablet  Commonly known as: ALDACTONE   25 mg, Oral, Daily      vilazodone 40 MG tablet tablet  Commonly known as: Viibryd   40 mg, Oral, Daily                Follow-up Information       Sravanthi Mcmahon APRN. Go on 2/13/2024.    Specialties: Nurse Practitioner, Neurology  Why: scheduled appt 2/13 @ 330 pm  on waitlist for sooner appointment  Contact information:  [address]  [place] [address]  [phone]               Chinmay Taylor MD. Go to.    Specialty: Cardiology  Why: 12/22/2023 at 1015  Contact information:  [address]  [facility] [address]  [phone]               Bladimir Zaldivar, OD Follow up.    Specialty: Ophthalmology  Why: 12/5/23 @ 10:30am please arrive 20 min before appointment to fill out new patient papers.  Contact information:  [address]  [facility] [address]  [phone]               Akash Rodriguez Jr., DO Follow up.    Specialties: Sports Medicine, Family Medicine, Emergency Medicine  Why: 11/20/23 @ 10:20am  Contact information:  [address]  [facility] [address]  [facility] [address]  [phone]               Cindy King, DO Follow up in 2 month(s).    Specialty: Physical Medicine and Rehabilitation  Contact information:  [address]  [address]  [facility] [id number] [phone]                              OUTPATIENT PT, OT, SLP    7-day Holter per cardiology      >30 on discharge    Pk Snow MD

## 2023-11-17 NOTE — PROGRESS NOTES
Inpatient Rehabilitation Discharge  Section A. Transportation  Issues Due to Lack of Transportation:  No    Section A. Medication List  Medication List to Subsequent Provider:  Not applicable.  Patient was not  discharged to a subsequent provider.  Discharge Location:  01 - Home  Medication List to Patient at Discharge:  Yes - Current reconciled medication  list provided to the patient, family and/or caregiver  Route(s) of Medication List Transmission to Patient:  Verbal (e.g., in-person,  telephone, video conferencing), Paper-based (e.g., fax, copies, printouts)    Signed by: Elle Dukes, Social Work

## 2023-11-17 NOTE — THERAPY DISCHARGE NOTE
Inpatient Rehabilitation - Speech Language Pathology Treatment Note/Discharge  [facility]     Patient Name: Flako Coreas  : 1965  MRN: [id number]  Today's Date: 2023               Admit Date: 2023     Visit Dx:    ICD-10-CM ICD-9-CM   1. Closed head injury with concussion, without loss of consciousness, subsequent encounter  S06.0X0D V58.89   2. 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    Acute CVA (cerebrovascular accident)    HTN (hypertension)    Late effect of cerebrovascular accident (CVA)    Concussion    Fall    Head injury, closed, with concussion    Alcohol abuse     Past Medical History:   Diagnosis Date    ADHD (attention deficit hyperactivity disorder)     On going issue    Anxiety     Lepe's esophagus     Benign prostatic hyperplasia Surgery in 2021    Clotting disorder Intestinal    Depression     Diverticulitis     Diverticulosis     Duodenitis     Enteritis     Failure to thrive (child)     Family history of blood clots     GERD (gastroesophageal reflux disease)     Hyperlipidemia     Hypertension     Hypertensive emergency     Low testosterone     Microcytosis     Pancreatitis     Pneumonia     PONV (postoperative nausea and vomiting)     Seasonal affective disorder     Shoulder injury     Stroke     Unexplained weight loss      Past Surgical History:   Procedure Laterality Date    COLON SURGERY      COLONOSCOPY      COLONOSCOPY N/A 2022    Procedure: COLONOSCOPY INTO CECUM WITH HOT SNARE POLYPECTOMY;   Surgeon: Albert Gallo MD;  Location: [facility] ENDOSCOPY;  Service: Gastroenterology;  Laterality: N/A;  PRE- GI BLEED  POST- DIVERTICULOSIS, POLYP    ENDOSCOPY N/A 12/10/2022    Procedure: ESOPHAGOGASTRODUODENOSCOPY;  Surgeon: Albert Gallo MD;  Location: [facility] ENDOSCOPY;  Service: Gastroenterology;  Laterality: N/A;  PRE- DARK STOOLS  POST- BARRETTS ESOPHAGUS    FRACTURE SURGERY  1980    for broken arm    FRACTURE SURGERY      for broken hand    INCISION AND DRAINAGE ABSCESS  2015    anal    PROSTATE SURGERY      SMALL INTESTINE SURGERY      TONSILLECTOMY         SLP Recommendation and Plan  Pt made steady progress during inpatient stay. At discharge, pt presented with a mild cognitive-communication deficit, characterized by reduced attention and memory skills. Pt was able to complete basic working memory tasks with occasional MIN cues. Pt completed high-level working memory tasks with intermittent MIN-MOD cues. Immediate memory for voicemails was 60% accurate without use of strategies and 90% accurate with use of note-taking strategy. Pt exhibited good recall of daily activities. Pt required occasional MIN-MOD cues for attention to detail when completing complex functional math tasks. Pt met goal for completing deductive reasoning puzzles with % accuracy. Pt was able to complete medication management task with pill box with NO cues. Pt exhibited occasional word finding delays and revisions in complex conversation. He required occasional MIN cues to complete high-level responsive naming tasks. Pt reported difficulty with concentration during complex conversation, requiring him to lose his train of thought or forget instructions given. Pt acknowledged the impact of anxiety on his ability to concentrate and reported he plans to seek mental health support at discharge. Recommend 24 hour supervision and assist with finance and medicine management. Recommend outpatient speech therapy at discharge, as pt  would like to live independently and return to work.    SLP EVALUATION (last 72 hours)       SLP SLC Evaluation       Row Name 11/17/23 1030 11/16/23 1300 11/16/23 1030 11/15/23 1300 11/15/23 1030       Communication Assessment/Intervention    Document Type discharge treatment  -AL therapy note (daily note)  -AL therapy note (daily note)  -AL therapy note (daily note)  -AL therapy note (daily note)  -AL    Patient/Family/Caregiver Comments/Observations Pt participated well.  -AL Pt participated well.  -AL Pt participated well. He reported he feels ready to go home. Reported he plans to reach to out to schedule counseling appointments for his mental health at discharge.  -AL Pt participated well.  -AL Pt participated well.  -AL       Pain Scale: Numbers Pre/Post-Treatment    Pretreatment Pain Rating 0/10 - no pain  -AL 0/10 - no pain  -AL 0/10 - no pain  -AL 0/10 - no pain  -AL 0/10 - no pain  -AL    Posttreatment Pain Rating 0/10 - no pain  -AL 0/10 - no pain  -AL 0/10 - no pain  -AL -- 0/10 - no pain  -AL      Row Name 11/14/23 1300                   Communication Assessment/Intervention    Document Type therapy note (daily note)  -AL        Patient/Family/Caregiver Comments/Observations Pt participated well.  -AL           Pain Scale: Numbers Pre/Post-Treatment    Pretreatment Pain Rating 0/10 - no pain  -AL                  User Key  (r) = Recorded By, (t) = Taken By, (c) = Cosigned By      Initials Name Effective Dates    Yoselyn Shah, MS CCC-SLP 06/16/21 -                        EDUCATION  The patient has been educated in the following areas:   Cognitive Impairment Communication Impairment.           SLP GOALS       Row Name 11/17/23 1030 11/16/23 1300 11/16/23 1030       Patient will demonstrate functional language skills for return to discharge environment     [place] with use of compensatory strategies  -AL -- --    Time frame by discharge  -AL -- --    Progress/Outcomes goal met  -AL -- --        Patient will demonstrate functional cognitive-linguistic skills for return to discharge environment    [place] with use of compensatory strategies  -AL -- --    Time frame by discharge  -AL -- --    Progress/Outcomes goal met  -AL -- --       Word Retrieval Skills Goal 1 (SLP)    Improve Word Retrieval Skills By Goal 1 (SLP) low frequency;responsive naming task;confrontational naming task;supplying an antonym;supplying a synonym;completing a divergent task;80%;independently (over 90% accuracy)  -AL -- --    Time Frame (Word Retrieval Goal 1, SLP) 1 week  -AL -- --    Progress/Outcomes (Word Retrieval Goal 1, SLP) good progress toward goal  -AL -- --       Attention Goal 1 (SLP)    Improve Attention by Goal 1 (SLP) complete divided attention task;complete selective attention task;90%;independently (over 90% accuracy)  -AL -- --    Time Frame (Attention Goal 1, SLP) 1 week  -AL -- --    Progress/Outcomes (Attention Goal 1, SLP) good progress toward goal  -AL -- --       Memory Skills Goal 1 (SLP)    Improve Memory Skills Through Goal 1 (SLP) recalling unrelated word lists with an imposed delay;listen to a paragraph and answer questions;repeat list in sequential order;recall details from a word list;use memory strategies;90%;independently (over 90% accuracy)  -AL -- --    Time Frame (Memory Skills Goal 1, SLP) 1 week  -AL -- --    Progress/Outcomes (Memory Skills Goal 1, SLP) good progress toward goal  -AL -- --    Comment (Memory Skills Goal 1, SLP) Working memory for 3 words reversed: 80% with NO cues, 100 wtih MIN cues. 4 words reversed: 45% with NO cues, 100% with MIN-MOD cues. Immediate memory for complex paragraph voicemails without note-takin% with NO cues. With use of note-takin% with NO cues, 100% with MIN cues.  -AL -- --       Reasoning Goal 1 (SLP)    Improve Reasoning Through Goal 1 (SLP) complete high level reasoning task;80%;independently (over 90% accuracy)  -AL -- --    Time Frame  (Reasoning Goal 1, SLP) 1 week  -AL -- --    Progress/Outcomes (Reasoning Goal 1, SLP) goal met  -AL -- --    Comment (Reasoning Goal 1, SLP) -- Initiated moderately complex logic puzzle: 100% with task so far. Plans to complete as homework.  -AL --       Functional Math Skills Goal 1 (SLP)    Improve Functional Math Skills Through Goal 1 (SLP) complete functional math task;90%;independently (over 90% accuracy)  -AL -- complete functional math task;90%;independently (over 90% accuracy)  -AL    Time Frame (Functional Math Skills Goal 1, SLP) 1 week  -AL -- 1 week  -AL    Progress/Outcomes (Functional Math Skills Goal 1, SLP) good progress toward goal  -AL -- good progress toward goal  -AL    Comment (Functional Math Skills Goal 1, SLP) -- -- Completed home renovation task (calculating area and prices for painting walls): 10/13 with NO cues, 13/13 with MIN-MOD cues.  -AL       Executive Functional Skills Goal 1 (SLP)    Improve Executive Function Skills Goal 1 (SLP) identify strategies, strengths, limitations;home management activity;perform self-evaluation;90%;independently (over 90% accuracy)  -AL identify strategies, strengths, limitations;home management activity;perform self-evaluation;90%;independently (over 90% accuracy)  -AL identify strategies, strengths, limitations;home management activity;perform self-evaluation;90%;independently (over 90% accuracy)  -AL    Time Frame (Executive Function Skills Goal 1, SLP) 1 week  -AL 1 week  -AL 1 week  -AL    Progress/Outcomes (Executive Function Skills Goal 1, SLP) goal met  -AL good progress toward goal  -AL good progress toward goal  -AL    Comment (Executive Function Skills Goal 1, SLP) -- Medicine management with pill box (4x day) and pt's list: 100% with NO cues. Exhibited good awareness of importance of medication compliance.  -AL Had conversation with pt about discharge follow-up therapy and importance of having 24 hour supervision and assistance from his son.  Pt voiced understanding.  -AL      Row Name 11/15/23 1300 11/15/23 1030 11/14/23 1300       Memory Skills Goal 1 (SLP)    Improve Memory Skills Through Goal 1 (SLP) recalling unrelated word lists with an imposed delay;listen to a paragraph and answer questions;repeat list in sequential order;recall details from a word list;use memory strategies;90%;independently (over 90% accuracy)  -AL -- --    Time Frame (Memory Skills Goal 1, SLP) 1 week  -AL -- --    Progress/Outcomes (Memory Skills Goal 1, SLP) good progress toward goal  -AL -- --    Comment (Memory Skills Goal 1, SLP) Working memory for 3 words reversed: 100% with NO cues. 4 words reversed: 50% with NO cues, 80% with MIN cues, 100% wtih MOD-MAX cues.  -AL -- --       Reasoning Goal 1 (SLP)    Improve Reasoning Through Goal 1 (SLP) -- complete high level reasoning task;80%;independently (over 90% accuracy)  -AL complete high level reasoning task;80%;independently (over 90% accuracy)  -AL    Time Frame (Reasoning Goal 1, SLP) -- 1 week  -AL 1 week  -AL    Progress/Outcomes (Reasoning Goal 1, SLP) -- good progress toward goal  -AL --    Comment (Reasoning Goal 1, SLP) -- Pt completed logic puzzle from previous session with overall 95% accuracy with NO cues, 100% with MIN cues.  -AL Reasoning for moderately complex logic puzzle: 100% with NO cues thus far. Pt received phone call cancelling an MD appointment during this task. He became upset and was unable to focus on completion of task. SLP contacted to nurse assist him with rescheduling his appointment.  -AL       Functional Math Skills Goal 1 (SLP)    Improve Functional Math Skills Through Goal 1 (SLP) complete functional math task;90%;independently (over 90% accuracy)  -AL complete functional math task;90%;independently (over 90% accuracy)  -AL complete functional math task;90%;independently (over 90% accuracy)  -AL    Time Frame (Functional Math Skills Goal 1, SLP) 1 week  -AL 1 week  -AL 1 week  -AL     Progress/Outcomes (Functional Math Skills Goal 1, SLP) good progress toward goal  -AL good progress toward goal  -AL good progress toward goal  -AL    Comment (Functional Math Skills Goal 1, SLP) Completed home renovation task (calculating area and prices): 80% with NO cues, 100% with MOD cues.  -AL Home renovation math (calculating area): 60% with NO cues, 100% with MIN-MOD cues.  -AL Home renovation math (calculating area): 80% with NO cues, 100% with MIN cues. Mild impulsivity noted during this task.  -AL       Executive Functional Skills Goal 1 (SLP)    Improve Executive Function Skills Goal 1 (SLP) -- identify strategies, strengths, limitations;home management activity;perform self-evaluation;90%;independently (over 90% accuracy)  -AL --    Time Frame (Executive Function Skills Goal 1, SLP) -- 1 week  -AL --    Progress/Outcomes (Executive Function Skills Goal 1, SLP) -- good progress toward goal  -AL --    Comment (Executive Function Skills Goal 1, SLP) -- Pt with good awareness of impact of emotions/anxiety on his performance during therapy task yesterday. Discussed strategies to assist, such as taking a break and managing emotions before returning to task.  -AL --              User Key  (r) = Recorded By, (t) = Taken By, (c) = Cosigned By      Initials Name Provider Type    Yoselyn Shah MS CCC-SLP Speech and Language Pathologist                        Time Calculation:    Time Calculation- SLP       Row Name 11/17/23 1114             Time Calculation- SLP    SLP Start Time 1030  -AL      SLP Stop Time 1100  -AL      SLP Time Calculation (min) 30 min  -AL      SLP Received On 11/17/23  -AL                User Key  (r) = Recorded By, (t) = Taken By, (c) = Cosigned By      Initials Name Provider Type    Yoselyn Shah MS CCC-SLP Speech and Language Pathologist                    Therapy Charges for Today       Code Description Service Date Service Provider Modifiers Qty    10829990777 [facility] DEV  OF COGN SKILLS INITIAL 15 MIN 11/16/2023 Yoselyn Gudino, MS CCC-SLP  1    [id number] [facility] DEV OF COGN SKILLS EACH ADDT'L 15 MIN 11/16/2023 Yoselyn Gudino, MS CCC-SLP  3    03590247028 [facility] DEV OF COGN SKILLS INITIAL 15 MIN 11/17/2023 Yoselyn Gudino, MS CCC-SLP  1    [id number] [facility] ST DEV OF COGN SKILLS EACH ADDT'L 15 MIN 11/17/2023 Yoselyn Gudino, MS CCC-SLP  1                     SLP Discharge Summary  Anticipated Discharge Disposition (SLP): home with OP services    Yoselyn Gudino MS CCC-SLP  11/17/2023

## 2023-11-17 NOTE — PROGRESS NOTES
Patient d/c home today. Patient's son will be assisting patient 24/7 at his home. He was able to take FMLA at his job for up to 3 months. Patient will be continuing outpatient PT/OT/ST at [facility], per patient's choice. His initial appt will be 11/22. Patient has necessary assistive equipment at home; walker, shower chair. Patient and son did not feel that a family conference or teaching with therapist was necessary before d/c. Patient's son will transport patient home today.

## 2023-12-04 ENCOUNTER — HOSPITAL ENCOUNTER (OUTPATIENT)
Dept: SPEECH THERAPY | Facility: HOSPITAL | Age: 58
Setting detail: THERAPIES SERIES
Discharge: HOME | End: 2023-12-04
Payer: COMMERCIAL

## 2023-12-04 ENCOUNTER — HOSPITAL ENCOUNTER (OUTPATIENT)
Dept: OCCUPATIONAL THERAPY | Facility: HOSPITAL | Age: 58
Setting detail: THERAPIES SERIES
Discharge: HOME | End: 2023-12-04
Payer: COMMERCIAL

## 2023-12-04 ENCOUNTER — HOSPITAL ENCOUNTER (OUTPATIENT)
Dept: PHYSICAL THERAPY | Facility: HOSPITAL | Age: 58
Setting detail: THERAPIES SERIES
Discharge: HOME | End: 2023-12-04
Payer: COMMERCIAL

## 2023-12-04 DIAGNOSIS — I69.30 LATE EFFECT OF CEREBROVASCULAR ACCIDENT (CVA): ICD-10-CM

## 2023-12-04 DIAGNOSIS — R41.841 COGNITIVE COMMUNICATION DISORDER: Primary | ICD-10-CM

## 2023-12-04 DIAGNOSIS — S06.0X0D CONCUSSION, MENTAL CONFUSION OR DISORIENTATION NO LOSS CONSCIOUS, SUBSEQUENT ENCOUNTER: Primary | ICD-10-CM

## 2023-12-04 DIAGNOSIS — I69.30 LATE EFFECT OF CEREBROVASCULAR ACCIDENT (CVA): Primary | ICD-10-CM

## 2023-12-04 DIAGNOSIS — R26.89 IMPAIRMENT OF BALANCE: ICD-10-CM

## 2023-12-04 DIAGNOSIS — R26.89 FUNCTIONAL GAIT ABNORMALITY: ICD-10-CM

## 2023-12-04 PROCEDURE — 97165 OT EVAL LOW COMPLEX 30 MIN: CPT

## 2023-12-04 PROCEDURE — 97162 PT EVAL MOD COMPLEX 30 MIN: CPT

## 2023-12-04 PROCEDURE — 96125 COGNITIVE TEST BY HC PRO: CPT

## 2023-12-04 NOTE — THERAPY EVALUATION
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    Acute CVA (cerebrovascular accident)    HTN (hypertension)    Late effect of cerebrovascular accident (CVA)    Concussion    Fall    Head injury, closed, with concussion    Alcohol abuse        Past Medical History:   Diagnosis Date    ADHD (attention deficit hyperactivity disorder)     On going issue    Anxiety     Lepe's esophagus     Benign prostatic hyperplasia Surgery in 2021    Clotting disorder Intestinal    Depression     Diverticulitis     Diverticulosis     Duodenitis     Enteritis     Failure to thrive (child)     Family history of blood clots     GERD (gastroesophageal reflux disease)     Hyperlipidemia     Hypertension     Hypertensive emergency     Low testosterone     Microcytosis     Pancreatitis     Pneumonia     PONV (postoperative nausea and vomiting)     Seasonal affective disorder     Shoulder injury     Stroke     Unexplained weight loss         Past Surgical History:   Procedure Laterality Date    COLON SURGERY      COLONOSCOPY      COLONOSCOPY N/A 2022    Procedure: COLONOSCOPY INTO CECUM WITH HOT SNARE POLYPECTOMY;  Surgeon: Albert Gallo MD;  Location: [facility] ENDOSCOPY;  Service: Gastroenterology;  Laterality: N/A;  PRE- GI BLEED  POST- DIVERTICULOSIS, POLYP    ENDOSCOPY N/A 12/10/2022    Procedure:  ESOPHAGOGASTRODUODENOSCOPY;  Surgeon: Albert Gallo MD;  Location: [facility] ENDOSCOPY;  Service: Gastroenterology;  Laterality: N/A;  PRE- DARK STOOLS  POST- BARRETTS ESOPHAGUS    FRACTURE SURGERY  1980    for broken arm    FRACTURE SURGERY      for broken hand    INCISION AND DRAINAGE ABSCESS  2015    anal    PROSTATE SURGERY      SMALL INTESTINE SURGERY      TONSILLECTOMY           Visit Dx:    ICD-10-CM ICD-9-CM   1. Cognitive communication disorder  R41.841 315.32            OP SLP Assessment/Plan - 12/04/23 1200          SLP Assessment    Functional Problems Speech Language- Adult/Cognition  -CR    Clinical Impression: Speech Language-Adult/Congnition Mild:;Cognitive Communication Impairment  -CR    SLP Diagnosis Mild cognitive communication impairment  -CR    Prognosis Excellent (comment)  -CR    Patient/caregiver participated in establishment of treatment plan and goals Yes  -CR    Patient would benefit from skilled therapy intervention Yes  -CR       SLP Plan    Frequency 2x per week  -CR    Duration 4-6 weeks  -CR    Planned CPT's? SLP DEV COG SKILLS INITIAL (15 MIN) : 41643;SLP DEV COG SKILLS ADD (15 MIN) : 70003  -CR    Expected Duration of Therapy Session (SLP Eval) 45  -CR              User Key  (r) = Recorded By, (t) = Taken By, (c) = Cosigned By      Initials Name Provider Type    CR Isabel Hi, SLP Speech and Language Pathologist                     SLP SLC Evaluation - 12/04/23 1100          Communication Assessment/Intervention    Document Type evaluation  -CR    Subjective Information no complaints  -CR    Patient Observations alert;cooperative  -CR    Patient Effort good  -CR    Symptoms Noted During/After Treatment none  -CR       General Information    Patient Profile Reviewed yes  -CR    Pertinent History Of Current Problem 57 y/o male referred for OP speech therapy services following Rehab stay from 11/7-11/17/23. PMH includes left cerebellar CVA 8/2023, concussion 10/2023, and  anxiety.  -CR    Precautions/Limitations, Vision vision impairment, left   per patient report -CR    Precautions/Limitations, Hearing WFL;for purposes of eval  -CR    Patient Level of Education Bachelor's  -CR    Prior Level of Function-Communication WFL  -CR    Plans/Goals Discussed with patient;agreed upon  -CR    Barriers to Rehab none identified  -CR    Patient's Goals for Discharge functional communication;functional cognition   eventually apply for work -CR    Standardized Assessment Used CLQT  -CR       Pain    Additional Documentation Pain Scale: Numbers Pre/Post-Treatment (Group)  -CR       Pain Scale: Numbers Pre/Post-Treatment    Pretreatment Pain Rating 0/10 - no pain  -CR    Posttreatment Pain Rating 0/10 - no pain  -CR       Motor Speech Assessment/Intervention    Motor Speech Function WFL  -CR       Standardized Tests    Cognitive/Memory Tests CLQT: Cognitive Linguistic Quick Test  -CR       CLQT (The Cognitive Linguistic Quick Test)    Attention Domain Score 137  -CR    Attention Severity Rating 3: Mild  -CR    Memory Domain Score 134  -CR    Memory Severity Rating 2: Moderate  -CR    Executive Function Domain Score 33  -CR    Executive Function Severity Rating 4: WNL  -CR    Language Domain Score 31  -CR    Language Severity Rating 4: WNL  -CR    Visuospatial Domain Score 77  -CR    Visuospatial Severity Rating 3: Mild  -CR    Clock Drawing Total Score 13  -CR    Clock Drawing Severity Rating WNL  -CR    Composite Severity Rating 3.2  -CR    Composite Severity Rating Range 3.4 - 2.5: Mild  -CR       SLP Evaluation Clinical Impressions    SLP Diagnosis mild;cognitive-linguistic disorder  -CR    SLP Diagnosis Comments Cognitive-linguistic evaluation completed this date. The Cognitive Linguistic Quick Test (CLQT) was used as a standardized assessment to evaluate patient's current cognitive skills (i.e., problem solving, reasoning, mental flexibility, memory, judgement, and speed of processing). Patient  received an overall composite score of 3.2/4.0 indicating mild cognitive impairment. The results of the assessment were consistent with results during Rehab stay and are as followed: Attention - Mild, Memory - Moderate, Executive Functions - WNL, Language -WNL, Visuospatial skills - Mild, and Clock Drawing - WNL. Suspect results of this assessment were greatly impacted by reported anxiety and impulsivity/fast rate of completion across tasks. Demonstrated mild difficulty and errors with memory and attention during the symbol cancellation, story retelling, symbol trails, design memory, and design generation subtests. Strengths noted in executive function and language during clock drawing, mazes, and confrontation/generative naming subtests.  Self-correction skills were not observed during this initial evaluation. During unstructured conversation, patient exhibited occasional word finding difficulties which appeared to be impacted by anxiety. Recommend continued speech therapy to target cognitive skills to increase patient’s independence.  -CR    Rehab Potential/Prognosis excellent  -CR    SLC Criteria for Skilled Therapy Interventions Met yes  -CR    Functional Impact difficulty in expressing complex messages;difficulty completing home management task;difficulty completing vocational tasks;restrictions in personal and social life  -CR       Recommendations    Therapy Frequency (SLP SLC) 2 days per week  -CR    Predicted Duration Therapy Intervention (Days) 4 weeks  -CR    Anticipated Discharge Disposition (SLP) home  -CR              User Key  (r) = Recorded By, (t) = Taken By, (c) = Cosigned By      Initials Name Provider Type    Isabel Felder, SLP Speech and Language Pathologist                                    OP SLP Education       Row Name 12/04/23 4804       Education    Barriers to Learning No barriers identified  -CR    Education Provided Described results of evaluation;Patient expressed  understanding of evaluation;Patient participated in establishing goals and treatment plan  -CR    Assessed Learning needs;Learning motivation;Learning preferences;Learning readiness  -CR    Learning Motivation Strong  -CR    Learning Method Explanation  -CR    Teaching Response Verbalized understanding;Demonstrated understanding  -CR              User Key  (r) = Recorded By, (t) = Taken By, (c) = Cosigned By      Initials Name Effective Dates    CR Isabel Hi, SLP 08/28/23 -                    SLP OP Goals       Row Name 12/04/23 1100          Goal Type Needed    Goal Type Needed Memory;Verbal Expression;Attention/Orientation  -CR        Verbal Expression Goals    Verbal Expression LTG's Patient will be able to use verbal expressive language skills to communicate effectively in all situations with unfamiliar listener  -CR     Patient will be able to use verbal expressive language skills to communicate effectively in all situations with unfamiliar listener 90%:;without cues  -CR     Status: Patient will be able to use verbal expressive language skills to communicate effectively in all situations with unfamiliar listener New  -CR     Verbal Expression STG's Patient will improve verbal expressive language skills by utilizing self-cueing strategies when encountering difficulty with expressive language  -CR     Patient will improve verbal expressive language skills by utilizing self-cueing strategies when encountering difficulty with expressive language 90%:;without cues  -CR     Status: Patient will improve verbal expressive language skills by utilizing self-cueing strategies when encountering difficulty with expressive language New  -CR        Memory Goals    Memory LTG's Patient will be able to remember information needed to return to work and function on work-related tasks  -CR     Patient will be able to remember information needed to return to work and function on work-related tasks 90%:;without cues  -CR      Status: Patient will be able to remember information needed to return to work and function on work-related tasks New  -CR     Memory STG's Patient’s memory skills will be enhanced as reported by patient by utilizing internal memory strategies to recall up to 3 pieces of information after a 5- minute delay  -CR     Patient’s memory skills will be enhanced as reported by patient by utilizing internal memory strategies to recall up to 3 pieces of information after a 5- minute delay 90%:;without cues  -CR     Status: Patient’s memory skills will be enhanced as reported by patient by utilizing internal memory strategies to recall up to 3 pieces of information after a 5- minute delay New  -CR        Attention/Orientation Goals    Attention/Orientation LTG's Patient will be able to use high level cognitive skills to allow patient to return to work  -CR     Patient will be able to use high level cognitive skills to allow patient to return to work Independently  -CR     Status: Patient will be able to use high level cognitive skills to allow patient to return to work New  -CR     Attention/Orientation STG's Patient will improve attention skills by sustaining focus to high-level cognitive tasks in order to complete task  -CR     Patient will improve attention skills by sustaining focus to high-level cognitive tasks in order to complete task 90%:;without cues  -CR     Status: Patient will improve attention skills by sustaining focus to high-level cognitive tasks in order to complete task New  -CR               User Key  (r) = Recorded By, (t) = Taken By, (c) = Cosigned By      Initials Name Provider Type    Isabel Felder, SLP Speech and Language Pathologist                  [facility] Patient Record Number: [id number]    FCM Scores (Percent Functional)  Cognition - 81%    PRO Scores  Neuro-QOL Cognitive Function - 30.9         Time Calculation:   SLP Start Time: 1100  SLP Stop Time: 1145  SLP Time Calculation (min): 45  min  Total Timed Code Minutes- SLP: 60 minute(s) (4717-3321)  Timed Charges  96125-Standardized cognitive performance testin  Total Minutes  Timed Charges Total Minutes: 60   Total Minutes: 60    Therapy Charges for Today       Code Description Service Date Service Provider Modifiers Qty    [id number] HC ST STD COG PERF TEST PER HOUR 2023 Isabel Hi, SLP GN 1                     ANA LAURA Elam  2023

## 2023-12-04 NOTE — THERAPY DISCHARGE NOTE
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    Acute CVA (cerebrovascular accident)    HTN (hypertension)    Late effect of cerebrovascular accident (CVA)    Concussion    Fall    Head injury, closed, with concussion    Alcohol abuse        Past Medical History:   Diagnosis Date    ADHD (attention deficit hyperactivity disorder)     On going issue    Anxiety     Lepe's esophagus     Benign prostatic hyperplasia Surgery in 2021    Clotting disorder Intestinal    Depression     Diverticulitis     Diverticulosis     Duodenitis     Enteritis     Failure to thrive (child)     Family history of blood clots     GERD (gastroesophageal reflux disease)     Hyperlipidemia     Hypertension     Hypertensive emergency     Low testosterone     Microcytosis     Pancreatitis     Pneumonia     PONV (postoperative nausea and vomiting)     Seasonal affective disorder     Shoulder injury     Stroke     Unexplained weight loss         Past Surgical History:   Procedure Laterality Date    COLON SURGERY      COLONOSCOPY      COLONOSCOPY N/A 2022    Procedure: COLONOSCOPY INTO CECUM WITH HOT SNARE POLYPECTOMY;  Surgeon: Albert Gallo MD;  Location: [facility] ENDOSCOPY;  Service: Gastroenterology;  Laterality: N/A;  PRE- GI BLEED  POST- DIVERTICULOSIS, POLYP    ENDOSCOPY N/A 12/10/2022    Procedure: ESOPHAGOGASTRODUODENOSCOPY;   Surgeon: Albert Gallo MD;  Location: [facility] ENDOSCOPY;  Service: Gastroenterology;  Laterality: N/A;  PRE- DARK STOOLS  POST- BARRETTS ESOPHAGUS    FRACTURE SURGERY  1980    for broken arm    FRACTURE SURGERY      for broken hand    INCISION AND DRAINAGE ABSCESS  2015    anal    PROSTATE SURGERY      SMALL INTESTINE SURGERY      TONSILLECTOMY           Visit Dx:    ICD-10-CM ICD-9-CM   1. Late effect of cerebrovascular accident (CVA)  I69.30 438.9            OT Neuro       Row Name 12/04/23 1200             Precautions and Contraindications    Precautions/Limitations fall precautions  -KN         Sensation    Sensation WNL? WFL  -KN         Coordination    Coordination Tests Box and Blocks;9-Hole Peg  -KN      Box and Blocks Left 75  -KN      Box and Blocks Right 71  -KN      9-Hole Peg Left 22  -KN      9-Hole Peg Right 23  -KN         General ROM    RT Upper Ext --  WFL  -KN      LT Upper Ext --  WFL  -KN      Right Hand --  WFL  -KN      Left Hand --  WFL  -KN         MMT (Manual Muscle Testing)    Rt Upper Ext Rt Shoulder Flexion;Rt Scapula WFL;Rt Wrist WFL;Rt Elbow/Forearm WFL  -KN      Lt Upper Ext Lt Shoulder Flexion;Lt Scapula WFL;Lt Elbow/Forearm WFL;Lt WristWFL  -KN                User Key  (r) = Recorded By, (t) = Taken By, (c) = Cosigned By      Initials Name Provider Type    Dedrick Cunningham OT Occupational Therapist                    Hand Therapy (last 24 hours)       Hand Eval       Row Name 12/04/23 1200             Hand  Strength     Strength Affected Side Bilateral  -KN          Strength Right    # Reps 3  -KN      Right Rung 2  -KN      Right  Test 1 100  -KN      Right  Test 2 100  -KN      Right  Test 3 90  -KN       Strength Average Right 96.67  -KN          Strength Left    # Reps 3  -KN      Left Rung 2  -KN      Left  Test 1 95  -KN      Left  Test 2 90  -KN      Left  Test 3 90  -KN       Strength Average Left 91.67  -KN         Pinch  Strength    Affected Side Bilateral  -KN         Right Hand Strength - Pinch (lbs)    Lateral 16 lbs  -KN      Tip (2 point) 8 lbs  -KN         Left Hand Strength - Pinch (lbs)    Lateral 12 lbs  -KN      Tip (2 point) 7 lbs  -KN         Therapy Education    Education Details Eval Results  -KN      Program New  -KN      How Provided Verbal  -KN      Provided to Patient  -KN      Level of Understanding Verbalized  -KN                User Key  (r) = Recorded By, (t) = Taken By, (c) = Cosigned By      Initials Name Provider Type    Dedrick Cunningham OT Occupational Therapist                        Therapy Education  Education Details: Eval Results  Program: New  How Provided: Verbal  Provided to: Patient  Level of Understanding: Verbalized         OT Assessment/Plan       Row Name 12/04/23 1214          OT Assessment    Assessment Comments Pt is a 58 year old male being seen for OP OT addressing UE function following a CVA. Pt reports that he lives with his son and he is ind with ADLs at home and is using a walker for mobility. Pt reports that he feels his balance is his main concern right now and has no complaints with his upper body function. Today pt was able to demo good B UE FMC, GMC, ROM, and strength. Pt will not benefit from skilled OT services at this time. Will DC.  -KN     Please refer to paper survey for additional self-reported information Yes  -KN        OT Plan    Planned CPT's? OT EVAL LOW COMPLEXITY: 83095  -KN               User Key  (r) = Recorded By, (t) = Taken By, (c) = Cosigned By      Initials Name Provider Type    Dedrick Cunningham OT Occupational Therapist                             Outcome Measure Options: Other Outcome Measure  9 Hole Peg  9-Hole Peg Left: 22  9-Hole Peg Right: 23  Box and Blocks  Box and Blocks Left: 75  Box and Blocks Right: 71  Other Outcome Measure Tool Used  Other Outcome Measure Tool Comments: UEFI: 72/80      Time Calculation:   OT Start Time: 1015  OT Stop Time:  1050  OT Time Calculation (min): 35 min  Untimed Charges  OT Eval/Re-eval Minutes: 35  Total Minutes  Untimed Charges Total Minutes: 35   Total Minutes: 35     Therapy Charges for Today       Code Description Service Date Service Provider Modifiers Qty    26274325220 HC OT EVAL LOW COMPLEXITY 3 12/4/2023 Dedrick Mcqueen OT GO 1                  OP OT Discharge Summary  Date of Discharge: 12/04/23  Reason for Discharge: At baseline function  Outcomes Achieved: Discharge from facility occurred on same date as evluation      Dedrick Mcqueen OT  12/4/2023

## 2023-12-04 NOTE — THERAPY EVALUATION
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    Acute CVA (cerebrovascular accident)    HTN (hypertension)    Late effect of cerebrovascular accident (CVA)    Concussion    Fall    Head injury, closed, with concussion    Alcohol abuse        Past Medical History:   Diagnosis Date    ADHD (attention deficit hyperactivity disorder)     On going issue    Anxiety     Lepe's esophagus     Benign prostatic hyperplasia Surgery in 2021    Clotting disorder Intestinal    Depression     Diverticulitis     Diverticulosis     Duodenitis     Enteritis     Failure to thrive (child)     Family history of blood clots     GERD (gastroesophageal reflux disease)     Hyperlipidemia     Hypertension     Hypertensive emergency     Low testosterone     Microcytosis     Pancreatitis     Pneumonia     PONV (postoperative nausea and vomiting)     Seasonal affective disorder     Shoulder injury     Stroke     Unexplained weight loss         Past Surgical History:   Procedure Laterality Date    COLON SURGERY      COLONOSCOPY      COLONOSCOPY N/A 2022    Procedure: COLONOSCOPY INTO CECUM WITH HOT SNARE POLYPECTOMY;  Surgeon: lAbert Gallo MD;  Location: [facility] ENDOSCOPY;  Service: Gastroenterology;  Laterality: N/A;  PRE- GI BLEED  POST- DIVERTICULOSIS, POLYP    ENDOSCOPY N/A 12/10/2022    Procedure: ESOPHAGOGASTRODUODENOSCOPY;  Surgeon: Sabino  Albert SOUZA MD;  Location: [facility] ENDOSCOPY;  Service: Gastroenterology;  Laterality: N/A;  PRE- DARK STOOLS  POST- BARRETTS ESOPHAGUS    FRACTURE SURGERY  1980    for broken arm    FRACTURE SURGERY      for broken hand    INCISION AND DRAINAGE ABSCESS  2015    anal    PROSTATE SURGERY      SMALL INTESTINE SURGERY      TONSILLECTOMY           Visit Dx:     ICD-10-CM ICD-9-CM   1. Concussion, mental confusion or disorientation no loss conscious, subsequent encounter  S06.0X0D V58.89     850.0   2. Late effect of cerebrovascular accident (CVA)  I69.30 438.9   3. Functional gait abnormality  R26.89 781.2   4. Impairment of balance  R26.89 781.2        Patient History       Row Name 12/04/23 1015             History    Chief Complaint Balance Problems;Difficulty with daily activities;Falls/history of falls  -LB      Date Current Problem(s) Began 10/23/23  -LB      Brief Description of Current Complaint Mr. Coreas is a 57 y/o male who was recently discharged following acute rehab stay due to a concussion from a fall hitting his head.   The patient has a recent history of CVA in August 2023.  The patient reports ongoing balance concerns, fainting episode since discharge from therapy  -LB      Patient/Caregiver Goals Return to prior level of function  -LB      Patient/Caregiver Goals Comment Improve balance and memory  -LB      Hand Dominance right-handed  -LB      Occupation/sports/leisure activities Pt currently not working  -LB         Fall Risk Assessment    Any falls in the past year: Yes  -LB      Number of falls reported in the last 12 months 10  per last month per patient report  -LB      Factors that contributed to the fall: Lost balance  Head turns  -LB         Services    Are you currently receiving Home Health services No  -LB      Do you plan to receive Home Health services in the near future No  -LB         Daily Activities    Pt Participated in POC and Goals Yes  -LB         Safety    Are you being hurt, hit,  "or frightened by anyone at home or in your life? No  -LB      Are you being neglected by a caregiver No  -LB      Have you had any of the following issues with Depression;Anxiety  -LB                User Key  (r) = Recorded By, (t) = Taken By, (c) = Cosigned By      Initials Name Provider Type    Alisa Mccloud PT Physical Therapist                         PT Neuro       Row Name 12/04/23 1015             Subjective    Subjective Comments \"I have fallen since last, mostly controlled.  One-time nauseated dizzy and went to the ground\"  -LB         Precautions and Contraindications    Precautions/Limitations fall precautions  -LB         Subjective Pain    Able to rate subjective pain? yes  -LB      Pre-Treatment Pain Level 0  -LB      Post-Treatment Pain Level 0  -LB         Home Living    Current Living Arrangements apartment  -LB      Home Accessibility stairs to enter home  -LB      Number of Stairs, Main Entrance --  20  -LB      Stair Railings, Main Entrance railings safe and in good condition  -LB      Home Equipment Rolling walker  Has cane and walking sticks at home  -LB         Vision-Basic Assessment    Patient Visual Report Blurring of vision when changing focal distance;Diplopia  Left eye fatigue  -LB         Cognition    Overall Cognitive Status WFL  -LB      Arousal/Alertness Appropriate responses to stimuli  -LB      Memory Decreased long term memory;Decreased short term memory  -LB      Orientation Level Oriented X4  Patient needed extra time to complete orientation questions  -LB      Safety Judgment Decreased awareness of need for safety  -LB      Deficits Decreased awareness of deficits  -LB         Sensation    Sensation WNL? WNL  -LB         Posture/Observations    Posture/Observations Comments Patient walked up to unit with front wheeled walker.  When making turns observed right of the walker lifted up.  -LB         Coordination    Coordination Tests Finger to nose eyes open  -LB      Finger " to Nose Eyes Open Left:;Impaired;Right:;Intact  moderate impairment  -LB         General ROM    GENERAL ROM COMMENTS No range of motion deficits in either lower extremities  -LB         MMT (Manual Muscle Testing)    Rt Lower Ext Rt Hip WFL;Rt Knee WFL;Rt Ankle WFL  -LB      Lt Lower Ext Lt Hip Flexion;Lt Hip ABduction;Lt Knee WFL;Lt Ankle WFL  -LB         MMT Left Lower Ext    Lt Hip Flexion MMT, Gross Movement (4/5) good  -LB      Lt Hip ABduction MMT, Gross Movement (4+/5) good plus  -LB         Bed Mobility    Bed Mobility rolling right;supine-sit-supine  -LB      Rolling Right Summers (Bed Mobility) independent  -LB      Supine-Sit-Supine Summers (Bed Mobility) independent  -LB         Transfers    Sit-Stand Summers (Transfers) supervision  places left foot behind under chair  -LB      Stand-Sit Summers (Transfers) supervision  -LB      Transfers, Sit-Stand-Sit, Assist Device rolling walker  -LB      Transfer, Impairments impaired balance  -LB      Comment, (Transfers) Initially coming to stand, pt has to readjust feet for suipport  -LB         Gait/Stairs (Locomotion)    Summers Level (Gait) standby assist  -LB      Assistive Device (Gait) walker, front-wheeled  Left hand appears ataxic and lifts walker off the floor with turns especially when turning to the right.  Cues to maintain contact on floor  -LB      Distance in Feet (Gait) 160; 100  -LB      Pattern (Gait) step-through  -LB      Deviations/Abnormal Patterns (Gait) base of support, wide;base of support, narrow;ataxic;julia decreased;scissoring  -LB      Left Sided Gait Deviations leans left  -LB      Gait Assessment/Intervention Patient's gait pattern varies from a normal narrow base of support wide base of support, step length significantly varies on both lower extremities.  Patient ambulated 100 feet with cane and min assist, cues for cane placement and assist for balance.  With use of cane increased variability of step  length and base of support noted  -LB      McLeod Level (Stairs) stand by assist  -LB      Number of Steps (Stairs) 4  -LB      Ascending Technique (Stairs) step-over-step  -LB      Descending Technique (Stairs) step-over-step  -LB         Curb Negotiation (Mobility)    McLeod, Curb Negotiation verbal cues;contact guard  -LB      Assistive Device (Curb Negotiation) walker, front-wheeled  -LB      Comment, Curb Negotiation (Mobility) Patient needed verbal cues for safety, began to lift walker up onto step with feet in a staggered position and distance from step was too far.  -LB         Balance Skills Training    Sitting-Level of Assistance Independent  -LB      Standing-Level of Assistance Contact guard  -LB      Gait Balance-Level of Assistance Minimum assistance  -LB      Balance Comments See Ramos and FGA scores  -LB                User Key  (r) = Recorded By, (t) = Taken By, (c) = Cosigned By      Initials Name Provider Type    LB Alisa Rudd, PT Physical Therapist                            Therapy Education  Education Details: Due to risk of falls strongly recommended continued use of front wheeled walker; feet placement prior to coming to stand  Given: Fall prevention and home safety, Mobility training  Program: New  How Provided: Verbal  Provided to: Patient  Level of Understanding: Verbalized     PT OP Goals       Row Name 12/04/23 1015          PT Short Term Goals    STG 1 Patient to be independent with initial HEP for balance and strength.  -LB     STG 2 Patient ambulate 200 feet with front wheeled walker, modified independent maintaining walker contact with floor and equal step length at least 50% of the time  -LB     STG 3 Patient to acsend descend 6 inch curb SBA with least restrictive device and safe technique  -LB     STG 4 Patient to come to stand with feet closer together independently and patient and able to maintain initial standing balance without swaying  -LB     STG 5 Patient to  negotiate a flight of steps with handrail and modified independent  -LB     Additional STG's STG 6  -LB     STG 6 Patient to improve Ramos balance score by 5 points to 30/56  -LB        Long Term Goals    LTG Date to Achieve 02/12/24  -LB     LTG 1 Patient to be independent with advanced HEP including lower extremity strength and balance.  -LB     LTG 2 Patient to score equal to or > than 35 on the Ramos balance scale indicating less risk for falls  -LB     LTG 3 Patient to score at least 20 out of 30 on FGA indicating less risk for falls  -LB     LTG 4 Patient ambulate 200 feet mod I LRAD indicating safe household and community ambulator.  -LB     LTG 5 Patient to be able to maintain tandem position for 30 seconds to indicate improved standing balance  -LB     LTG 6 Patient to report no more than 1 fall in a 4-week timeframe  -LB        Time Calculation    PT Goal Re-Cert Due Date 01/01/24  -LB               User Key  (r) = Recorded By, (t) = Taken By, (c) = Cosigned By      Initials Name Provider Type    Alisa Mccloud, PT Physical Therapist                     PT Assessment/Plan       Row Name 12/04/23 1113          PT Assessment    Functional Limitations Decreased safety during functional activities;Impaired gait;Impaired locomotion;Limitation in home management;Performance in self-care ADL  -LB     Impairments Balance;Coordination;Gait;Motor function  -LB     Assessment Comments Mr. Coreas is a 58-year-old male who has recent complex medical history.  Past medical history includes hypertension and hyperlipidemia with his blood pressure uncontrolled earlier in the year.  The patient reports compliance with his medication at this time.  Starting in August of this year the patient started with balance issues and falls and MRI showed numerous small foci of acute infarcts in the cerebellar.  It appears the patient was discharged to an acute rehab unit but did not stay.  The patient revisited the ER in October after  he fell in the bathroom and had a spinning sensation.  The patient was transferred to rehab due to concussion and closed head injury due to the fall patient was discharged on November 17 and returns today for outpatient therapies.  The patient reports several falls at home and multiple loss of balance is since discharged home.  Patient reports dizziness especially when looking up.  The patient had minimal proximal weakness in left hip with strength with ankle and knee within functional limits.  The patient's presentation was evolving due to significant balance impairments.  While walking with a front wheeled walker the patient had a tendency to lift half the walker up,  variable step length and ataxic movements.  Completion of both the FGA and the Ramos balance test the patient displays significant balance impairments .  FGA was completed without use of assistive device and scored 4 out of 30 and Ramos patient scored 25 out of 56, both indicating high risk for falls.  A trial with cane was performed during evaluation and the patient needed cues for cane placement and coordination.  Education provided and strongly encourage patient to continue to use his front wheeled walker to help maintain taking balance.  Anticipate that the patient will benefit from continued therapy to improve balance and improve safety within his home and the community setting.  -LB     Please refer to paper survey for additional self-reported information Yes  -LB     Rehab Potential Good  -LB     Patient/caregiver participated in establishment of treatment plan and goals Yes  -LB     Patient would benefit from skilled therapy intervention Yes  -LB        PT Plan    PT Frequency 2x/week  -LB     Predicted Duration of Therapy Intervention (PT) 20 visits 10-12 weeks  -LB     Planned CPT's? PT EVAL MOD COMPLELITY: 09597;PT THER PROC EA 15 MIN: 13426;PT THER ACT EA 15 MIN: 88217;PT NEUROMUSC RE-EDUCATION EA 15 MIN: 30641;PT GAIT TRAINING EA 15 MIN:  "07279;PT RE-EVAL: 23738  -LB     Physical Therapy Interventions (Optional Details) balance training;gait training;gross motor skills;home exercise program;motor coordination training;neuromuscular re-education;patient/family education;stair training;strengthening;transfer training;vestibular training  -LB               User Key  (r) = Recorded By, (t) = Taken By, (c) = Cosigned By      Initials Name Provider Type    Alisa Mccloud, CARIN Physical Therapist                        OP Exercises       Row Name 12/04/23 1015             Subjective    Subjective Comments \"I have fallen since last, mostly controlled.  One-time nauseated dizzy and went to the ground\"  -LB         Subjective Pain    Able to rate subjective pain? yes  -LB      Pre-Treatment Pain Level 0  -LB      Post-Treatment Pain Level 0  -LB                User Key  (r) = Recorded By, (t) = Taken By, (c) = Cosigned By      Initials Name Provider Type    Alisa Mccloud, CARIN Physical Therapist                                Outcome Measure Options: Ramos Balance, FGA (Functional Gait Assessment)  Ramos Balance Scale  Sitting to Standing: needs minimal aid to stand or stabilize  Standing Unsupported: able to stand 2 minutes with supervision  Sitting with Back Unsupported but Feet Supported on Floor or on Stool: able to sit safely and securely for 2 minutes  Standing to Sitting: controls descent by using hands  Transfers: able to transfer safely definite need of hands  Standing Unsupported with Eyes Closed: needs help to keep from falling  Standing Unsupported with Feet Together: able to place feet together independently but unable to hold for 30 seconds  Reaching Forward with Outstretched Arm While Standing: can reach forward 5 cm (2 inches)   Object From the Floor From a Standing Position: able to  object but needs supervision  Turning to Look Behind Over Left and Right Shoulders While Standing: needs supervision when turning  Turn 360 Degrees: " needs close supervision or verbal cuing  Place Alternate Foot on Step or Stool While Standing Unsupported: needs assistance to keep from falling/unable to try  Standing Unsupported with One Foot in Front: needs help to step but can hold 15 seconds  Standing on One Leg: tries to lift leg unable to hold 3 seconds but remains standing independently  Ramos Total Score: 25  Functional Gait Assessment (FGA)  Gait Level Surface: Moderate Impairment  Change in Gait Speed: Severe Impairment  Gait with Horizontal Head Turns: Severe Impairment  Gait with Vertical Head Turns: Severe Impairment  Gait and Pivot Turn: Moderate Impairment  Step Over Obstacle: Severe Impairment  Gait with Narrow Base of Support: Severe Impairment  Gait with Eyes Closed: Severe Impairment  Ambulating Backwards: Severe Impairment  Steps: Mild Impairment  FGA Total Score: 4    Time Calculation:   Start Time: 0929  Stop Time: 1011  Time Calculation (min): 42 min  Untimed Charges  PT Eval/Re-eval Minutes: 42  Total Minutes  Untimed Charges Total Minutes: 42   Total Minutes: 42   Therapy Charges for Today       Code Description Service Date Service Provider Modifiers Qty    86101444107 HC PT EVAL MOD COMPLEXITY 4 12/4/2023 Alisa Rudd, PT GP 1            PT G-Codes  Outcome Measure Options: Ramos Balance, FGA (Functional Gait Assessment)  Ramos Total Score: 25  FGA Total Score: 4         Alisa Rudd PT  12/4/2023

## 2023-12-06 ENCOUNTER — APPOINTMENT (OUTPATIENT)
Dept: PHYSICAL THERAPY | Facility: HOSPITAL | Age: 58
End: 2023-12-06
Payer: MEDICAID

## 2023-12-06 ENCOUNTER — APPOINTMENT (OUTPATIENT)
Dept: OCCUPATIONAL THERAPY | Facility: HOSPITAL | Age: 58
End: 2023-12-06
Payer: MEDICAID

## 2023-12-06 ENCOUNTER — APPOINTMENT (OUTPATIENT)
Dept: SPEECH THERAPY | Facility: HOSPITAL | Age: 58
End: 2023-12-06
Payer: MEDICAID

## 2023-12-10 ENCOUNTER — APPOINTMENT (OUTPATIENT)
Dept: GENERAL RADIOLOGY | Facility: HOSPITAL | Age: 58
End: 2023-12-10
Payer: MEDICAID

## 2023-12-10 ENCOUNTER — APPOINTMENT (OUTPATIENT)
Dept: CT IMAGING | Facility: HOSPITAL | Age: 58
End: 2023-12-10
Payer: MEDICAID

## 2023-12-10 ENCOUNTER — HOSPITAL ENCOUNTER (OUTPATIENT)
Facility: HOSPITAL | Age: 58
Setting detail: OBSERVATION
LOS: 2 days | Discharge: HOME | End: 2023-12-13
Attending: EMERGENCY MEDICINE | Admitting: INTERNAL MEDICINE
Payer: MEDICAID

## 2023-12-10 DIAGNOSIS — I1A.0 RESISTANT HYPERTENSION: ICD-10-CM

## 2023-12-10 DIAGNOSIS — I63.9 ACUTE CVA (CEREBROVASCULAR ACCIDENT): ICD-10-CM

## 2023-12-10 DIAGNOSIS — R55 SYNCOPE AND COLLAPSE: Primary | ICD-10-CM

## 2023-12-10 DIAGNOSIS — I10 POORLY-CONTROLLED HYPERTENSION: ICD-10-CM

## 2023-12-10 PROBLEM — S09.90XA CHI (CLOSED HEAD INJURY), INITIAL ENCOUNTER: Status: ACTIVE | Noted: 2023-12-10

## 2023-12-10 LAB
ALBUMIN SERPL-MCNC: 4.2 G/DL (ref 3.5–5.2)
ALBUMIN/GLOB SERPL: 1.7 G/DL
ALP SERPL-CCNC: 66 U/L (ref 39–117)
ALT SERPL W P-5'-P-CCNC: 42 U/L (ref 1–41)
ANION GAP SERPL CALCULATED.3IONS-SCNC: 13.2 MMOL/L (ref 5–15)
APTT PPP: 29.4 SECONDS (ref 22.7–35.4)
AST SERPL-CCNC: 22 U/L (ref 1–40)
BASOPHILS # BLD AUTO: 0.03 10*3/MM3 (ref 0–0.2)
BASOPHILS NFR BLD AUTO: 0.4 % (ref 0–1.5)
BILIRUB SERPL-MCNC: 0.5 MG/DL (ref 0–1.2)
BUN SERPL-MCNC: 12 MG/DL (ref 6–20)
BUN/CREAT SERPL: 12.5 (ref 7–25)
CALCIUM SPEC-SCNC: 9.1 MG/DL (ref 8.6–10.5)
CHLORIDE SERPL-SCNC: 106 MMOL/L (ref 98–107)
CO2 SERPL-SCNC: 22.8 MMOL/L (ref 22–29)
CREAT SERPL-MCNC: 0.96 MG/DL (ref 0.76–1.27)
DEPRECATED RDW RBC AUTO: 38.7 FL (ref 37–54)
EGFRCR SERPLBLD CKD-EPI 2021: 92.8 ML/MIN/1.73
EOSINOPHIL # BLD AUTO: 0.49 10*3/MM3 (ref 0–0.4)
EOSINOPHIL NFR BLD AUTO: 7.3 % (ref 0.3–6.2)
ERYTHROCYTE [DISTWIDTH] IN BLOOD BY AUTOMATED COUNT: 12.8 % (ref 12.3–15.4)
GLOBULIN UR ELPH-MCNC: 2.5 GM/DL
GLUCOSE SERPL-MCNC: 97 MG/DL (ref 65–99)
HCT VFR BLD AUTO: 39.1 % (ref 37.5–51)
HGB BLD-MCNC: 12.4 G/DL (ref 13–17.7)
IMM GRANULOCYTES # BLD AUTO: 0.02 10*3/MM3 (ref 0–0.05)
IMM GRANULOCYTES NFR BLD AUTO: 0.3 % (ref 0–0.5)
INR PPP: 0.97 (ref 0.9–1.1)
LYMPHOCYTES # BLD AUTO: 1.64 10*3/MM3 (ref 0.7–3.1)
LYMPHOCYTES NFR BLD AUTO: 24.4 % (ref 19.6–45.3)
MCH RBC QN AUTO: 26.4 PG (ref 26.6–33)
MCHC RBC AUTO-ENTMCNC: 31.7 G/DL (ref 31.5–35.7)
MCV RBC AUTO: 83.2 FL (ref 79–97)
MONOCYTES # BLD AUTO: 0.6 10*3/MM3 (ref 0.1–0.9)
MONOCYTES NFR BLD AUTO: 8.9 % (ref 5–12)
NEUTROPHILS NFR BLD AUTO: 3.94 10*3/MM3 (ref 1.7–7)
NEUTROPHILS NFR BLD AUTO: 58.7 % (ref 42.7–76)
NRBC BLD AUTO-RTO: 0 /100 WBC (ref 0–0.2)
PLATELET # BLD AUTO: 283 10*3/MM3 (ref 140–450)
PMV BLD AUTO: 9.6 FL (ref 6–12)
POTASSIUM SERPL-SCNC: 3.6 MMOL/L (ref 3.5–5.2)
PROT SERPL-MCNC: 6.7 G/DL (ref 6–8.5)
PROTHROMBIN TIME: 13 SECONDS (ref 11.7–14.2)
QT INTERVAL: 419 MS
QTC INTERVAL: 456 MS
RBC # BLD AUTO: 4.7 10*6/MM3 (ref 4.14–5.8)
SODIUM SERPL-SCNC: 142 MMOL/L (ref 136–145)
WBC NRBC COR # BLD AUTO: 6.72 10*3/MM3 (ref 3.4–10.8)

## 2023-12-10 PROCEDURE — 90471 IMMUNIZATION ADMIN: CPT | Performed by: PHYSICIAN ASSISTANT

## 2023-12-10 PROCEDURE — 85025 COMPLETE CBC W/AUTO DIFF WBC: CPT | Performed by: PHYSICIAN ASSISTANT

## 2023-12-10 PROCEDURE — 80053 COMPREHEN METABOLIC PANEL: CPT | Performed by: PHYSICIAN ASSISTANT

## 2023-12-10 PROCEDURE — 96374 THER/PROPH/DIAG INJ IV PUSH: CPT

## 2023-12-10 PROCEDURE — G0378 HOSPITAL OBSERVATION PER HR: HCPCS

## 2023-12-10 PROCEDURE — 25010000002 HYDRALAZINE PER 20 MG: Performed by: EMERGENCY MEDICINE

## 2023-12-10 PROCEDURE — 85610 PROTHROMBIN TIME: CPT | Performed by: PHYSICIAN ASSISTANT

## 2023-12-10 PROCEDURE — 25010000002 TETANUS-DIPHTH-ACELL PERTUSSIS 5-2.5-18.5 LF-MCG/0.5 SUSPENSION PREFILLED SYRINGE: Performed by: PHYSICIAN ASSISTANT

## 2023-12-10 PROCEDURE — 90715 TDAP VACCINE 7 YRS/> IM: CPT | Performed by: PHYSICIAN ASSISTANT

## 2023-12-10 PROCEDURE — 99284 EMERGENCY DEPT VISIT MOD MDM: CPT

## 2023-12-10 PROCEDURE — 93010 ELECTROCARDIOGRAM REPORT: CPT | Performed by: STUDENT IN AN ORGANIZED HEALTH CARE EDUCATION/TRAINING PROGRAM

## 2023-12-10 PROCEDURE — 85730 THROMBOPLASTIN TIME PARTIAL: CPT | Performed by: PHYSICIAN ASSISTANT

## 2023-12-10 PROCEDURE — 71045 X-RAY EXAM CHEST 1 VIEW: CPT

## 2023-12-10 PROCEDURE — 70450 CT HEAD/BRAIN W/O DYE: CPT

## 2023-12-10 PROCEDURE — 93005 ELECTROCARDIOGRAM TRACING: CPT

## 2023-12-10 RX ORDER — ATORVASTATIN CALCIUM 80 MG/1
80 TABLET, FILM COATED ORAL NIGHTLY
Status: DISCONTINUED | OUTPATIENT
Start: 2023-12-10 | End: 2023-12-13 | Stop reason: HOSPADM

## 2023-12-10 RX ORDER — FOLIC ACID 1 MG/1
1 TABLET ORAL DAILY
Status: DISCONTINUED | OUTPATIENT
Start: 2023-12-11 | End: 2023-12-13 | Stop reason: HOSPADM

## 2023-12-10 RX ORDER — HYDROCHLOROTHIAZIDE 12.5 MG/1
12.5 TABLET ORAL DAILY
Status: DISCONTINUED | OUTPATIENT
Start: 2023-12-11 | End: 2023-12-13 | Stop reason: HOSPADM

## 2023-12-10 RX ORDER — HYDRALAZINE HYDROCHLORIDE 50 MG/1
100 TABLET, FILM COATED ORAL EVERY 8 HOURS SCHEDULED
Status: DISCONTINUED | OUTPATIENT
Start: 2023-12-10 | End: 2023-12-13 | Stop reason: HOSPADM

## 2023-12-10 RX ORDER — SODIUM CHLORIDE 0.9 % (FLUSH) 0.9 %
10 SYRINGE (ML) INJECTION AS NEEDED
Status: DISCONTINUED | OUTPATIENT
Start: 2023-12-10 | End: 2023-12-13 | Stop reason: HOSPADM

## 2023-12-10 RX ORDER — SODIUM CHLORIDE 9 MG/ML
40 INJECTION, SOLUTION INTRAVENOUS AS NEEDED
Status: DISCONTINUED | OUTPATIENT
Start: 2023-12-10 | End: 2023-12-13 | Stop reason: HOSPADM

## 2023-12-10 RX ORDER — VILAZODONE HYDROCHLORIDE 40 MG/1
40 TABLET ORAL DAILY
Status: DISCONTINUED | OUTPATIENT
Start: 2023-12-11 | End: 2023-12-13 | Stop reason: HOSPADM

## 2023-12-10 RX ORDER — CARVEDILOL 6.25 MG/1
6.25 TABLET ORAL 2 TIMES DAILY WITH MEALS
Status: DISCONTINUED | OUTPATIENT
Start: 2023-12-10 | End: 2023-12-13

## 2023-12-10 RX ORDER — ACETAMINOPHEN 325 MG/1
650 TABLET ORAL EVERY 6 HOURS PRN
Status: DISCONTINUED | OUTPATIENT
Start: 2023-12-10 | End: 2023-12-13 | Stop reason: HOSPADM

## 2023-12-10 RX ORDER — LISINOPRIL 40 MG/1
40 TABLET ORAL
Status: DISCONTINUED | OUTPATIENT
Start: 2023-12-11 | End: 2023-12-13 | Stop reason: HOSPADM

## 2023-12-10 RX ORDER — DIPHENOXYLATE HYDROCHLORIDE AND ATROPINE SULFATE 2.5; .025 MG/1; MG/1
1 TABLET ORAL DAILY
Status: DISCONTINUED | OUTPATIENT
Start: 2023-12-11 | End: 2023-12-13 | Stop reason: HOSPADM

## 2023-12-10 RX ORDER — NITROGLYCERIN 0.4 MG/1
0.4 TABLET SUBLINGUAL
Status: DISCONTINUED | OUTPATIENT
Start: 2023-12-10 | End: 2023-12-13 | Stop reason: HOSPADM

## 2023-12-10 RX ORDER — SPIRONOLACTONE 25 MG/1
25 TABLET ORAL DAILY
Status: DISCONTINUED | OUTPATIENT
Start: 2023-12-11 | End: 2023-12-13 | Stop reason: HOSPADM

## 2023-12-10 RX ORDER — LIDOCAINE HYDROCHLORIDE AND EPINEPHRINE 10; 10 MG/ML; UG/ML
10 INJECTION, SOLUTION INFILTRATION; PERINEURAL ONCE
Status: DISCONTINUED | OUTPATIENT
Start: 2023-12-10 | End: 2023-12-13 | Stop reason: HOSPADM

## 2023-12-10 RX ORDER — HYDRALAZINE HYDROCHLORIDE 20 MG/ML
20 INJECTION INTRAMUSCULAR; INTRAVENOUS ONCE
Status: COMPLETED | OUTPATIENT
Start: 2023-12-10 | End: 2023-12-10

## 2023-12-10 RX ORDER — HYDROXYZINE HYDROCHLORIDE 25 MG/1
25 TABLET, FILM COATED ORAL 3 TIMES DAILY PRN
Status: DISCONTINUED | OUTPATIENT
Start: 2023-12-10 | End: 2023-12-13 | Stop reason: HOSPADM

## 2023-12-10 RX ORDER — ASPIRIN 81 MG/1
81 TABLET ORAL DAILY
Status: DISCONTINUED | OUTPATIENT
Start: 2023-12-11 | End: 2023-12-13 | Stop reason: HOSPADM

## 2023-12-10 RX ORDER — PANTOPRAZOLE SODIUM 40 MG/1
40 TABLET, DELAYED RELEASE ORAL
Status: DISCONTINUED | OUTPATIENT
Start: 2023-12-11 | End: 2023-12-13 | Stop reason: HOSPADM

## 2023-12-10 RX ORDER — AMOXICILLIN 250 MG
2 CAPSULE ORAL 2 TIMES DAILY
Status: DISCONTINUED | OUTPATIENT
Start: 2023-12-10 | End: 2023-12-13 | Stop reason: HOSPADM

## 2023-12-10 RX ORDER — SODIUM CHLORIDE 0.9 % (FLUSH) 0.9 %
10 SYRINGE (ML) INJECTION EVERY 12 HOURS SCHEDULED
Status: DISCONTINUED | OUTPATIENT
Start: 2023-12-10 | End: 2023-12-13 | Stop reason: HOSPADM

## 2023-12-10 RX ORDER — CHOLECALCIFEROL (VITAMIN D3) 125 MCG
5 CAPSULE ORAL NIGHTLY
Status: DISCONTINUED | OUTPATIENT
Start: 2023-12-10 | End: 2023-12-13 | Stop reason: HOSPADM

## 2023-12-10 RX ORDER — AMLODIPINE BESYLATE 5 MG/1
5 TABLET ORAL DAILY
Status: DISCONTINUED | OUTPATIENT
Start: 2023-12-11 | End: 2023-12-13 | Stop reason: HOSPADM

## 2023-12-10 RX ADMIN — CARVEDILOL 6.25 MG: 6.25 TABLET, FILM COATED ORAL at 17:12

## 2023-12-10 RX ADMIN — HYDROXYZINE HYDROCHLORIDE 25 MG: 25 TABLET ORAL at 17:15

## 2023-12-10 RX ADMIN — ATORVASTATIN CALCIUM 80 MG: 80 TABLET, FILM COATED ORAL at 20:05

## 2023-12-10 RX ADMIN — HYDRALAZINE HYDROCHLORIDE 20 MG: 20 INJECTION INTRAMUSCULAR; INTRAVENOUS at 15:08

## 2023-12-10 RX ADMIN — TETANUS TOXOID, REDUCED DIPHTHERIA TOXOID AND ACELLULAR PERTUSSIS VACCINE, ADSORBED 0.5 ML: 5; 2.5; 8; 8; 2.5 SUSPENSION INTRAMUSCULAR at 11:43

## 2023-12-10 RX ADMIN — Medication 5 MG: at 20:05

## 2023-12-10 RX ADMIN — DOCUSATE SODIUM 50MG AND SENNOSIDES 8.6MG 2 TABLET: 8.6; 5 TABLET, FILM COATED ORAL at 20:05

## 2023-12-10 RX ADMIN — Medication 10 ML: at 20:05

## 2023-12-10 RX ADMIN — HYDRALAZINE HYDROCHLORIDE 100 MG: 50 TABLET, FILM COATED ORAL at 20:05

## 2023-12-10 NOTE — ED PROVIDER NOTES
" EMERGENCY DEPARTMENT ENCOUNTER    Room Number:  18/18  Date of encounter:  12/10/2023  PCP: Akash Rodriguez Jr., DO  Historian: Patient  Full history not obtainable due to: None    HPI:  Chief Complaint: Syncope    Context: Flako Coreas is a 58 y.o. male with a PMH significant for anxiety, vertigo, ataxia, CVA, alcohol abuse who presents to the ED c/o close head injury.  The patient was ambulating at his house this morning when he became abruptly lightheaded and dizzy with associated nausea.  Before he could lower himself to the ground he lost consciousness.  He fell to the floor and struck his right-sided forehead on the ground.  The patient reports frequent falls since having a stroke earlier this year but his 2 most recent falls have been different in the sense that they have had accompanied syncopal episodes.  His syncopal event today was brief and he returned to baseline almost immediately.  He denies neck pain.  No chest pain, shortness of breath, vomiting, abdominal pain.  He does take a blood thinner but does not know which kind.  He has been evaluated for syncope with \"multiple heart monitors and a bunch of other tests\" without clear cause.      MEDICAL RECORD REVIEW:    Upon review of the medical record it appears the patient was evaluated in the office with PT for concussion on Dec 4, 2023.  The patient had a normal BMP on 11/6/2023 as well as a normal BMP on 11/2/2023.    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 10/23/2023    Acute CVA (cerebrovascular accident) 10/23/2023    HTN (hypertension) 10/24/2023    Late effect of cerebrovascular accident (CVA) 10/25/2023    Concussion 10/27/2023    Fall 10/27/2023    Head injury, closed, with concussion 10/27/2023    Alcohol abuse 10/28/2023     Resolved Ambulatory Problems     Diagnosis Date Noted    Essential hypertension 06/26/2017    Abdominal pain 06/20/2018    Generalized abdominal pain 12/08/2022    Gastrointestinal hemorrhage 12/08/2022    Acute CVA (cerebrovascular accident) 08/17/2023    TIA (transient ischemic attack) 10/26/2023     Past Medical History:   Diagnosis Date    ADHD (attention deficit hyperactivity disorder)     Anxiety     Lepe's esophagus     Benign prostatic hyperplasia Surgery in 12/2021    Clotting disorder Intestinal    Depression     Diverticulitis     Duodenitis     Enteritis     Failure to thrive (child)     Family history of blood clots     Hyperlipidemia     Hypertension     Low testosterone     Microcytosis     Pancreatitis     Pneumonia     PONV (postoperative nausea and vomiting)     Seasonal affective disorder     Shoulder injury     Stroke     Unexplained weight loss          PAST SURGICAL HISTORY  Past Surgical History:   Procedure Laterality Date    COLON SURGERY      COLONOSCOPY      COLONOSCOPY N/A 12/11/2022    Procedure: COLONOSCOPY INTO CECUM WITH HOT SNARE POLYPECTOMY;  Surgeon: Albert Gallo MD;  Location: [facility] ENDOSCOPY;  Service: Gastroenterology;  Laterality: N/A;  PRE- GI BLEED  POST- DIVERTICULOSIS, POLYP    ENDOSCOPY N/A 12/10/2022    Procedure: ESOPHAGOGASTRODUODENOSCOPY;  Surgeon: Albert Gallo MD;  Location: [facility] ENDOSCOPY;  Service: Gastroenterology;  Laterality: N/A;  PRE- DARK STOOLS  POST- BARRETTS ESOPHAGUS    FRACTURE SURGERY  1980    for broken arm    FRACTURE SURGERY      for broken hand    INCISION AND DRAINAGE ABSCESS   2015    anal    PROSTATE SURGERY      SMALL INTESTINE SURGERY      TONSILLECTOMY           FAMILY HISTORY  Family History   Problem Relation Age of Onset    Stroke Mother     Stroke Father          SOCIAL HISTORY  Social History     Socioeconomic History    Marital status: Single   Tobacco Use    Smoking status: Never    Smokeless tobacco: Never   Vaping Use    Vaping Use: Never used   Substance and Sexual Activity    Alcohol use: Not Currently     Comment: 12 beers on the weekend    Drug use: Not Currently     Frequency: 1.0 times per week     Types: Marijuana     Comment: patient states he has been smoking marijuania daily x 2 weeks    Sexual activity: Defer     Partners: Female         ALLERGIES  Patient has no known allergies.        REVIEW OF SYSTEMS    All systems reviewed and marked as negative except as listed in HPI     PHYSICAL EXAM    I have reviewed the triage vital signs and nursing notes.    ED Triage Vitals   Temp Heart Rate Resp BP SpO2   12/10/23 1057 12/10/23 1056 12/10/23 1056 -- 12/10/23 1056   97.3 °F (36.3 °C) 82 18  98 %      Temp src Heart Rate Source Patient Position BP Location FiO2 (%)   -- -- -- -- --              Physical Exam  Constitutional:       General: He is not in acute distress.     Appearance: He is well-developed.   HENT:      Head: Normocephalic and atraumatic.     Eyes:      General: No scleral icterus.     Conjunctiva/sclera: Conjunctivae normal.   Neck:      Trachea: No tracheal deviation.   Cardiovascular:      Rate and Rhythm: Normal rate and regular rhythm.   Pulmonary:      Effort: Pulmonary effort is normal.      Breath sounds: Normal breath sounds.   Abdominal:      Palpations: Abdomen is soft.      Tenderness: There is no abdominal tenderness. There is no guarding.   Musculoskeletal:         General: No deformity.      Cervical back: Normal range of motion. No spinous process tenderness or muscular tenderness.   Lymphadenopathy:      Cervical: No cervical  adenopathy.   Skin:     General: Skin is warm and dry.   Neurological:      Mental Status: He is alert and oriented to person, place, and time.      Cranial Nerves: Cranial nerves 2-12 are intact.      Sensory: Sensation is intact.      Motor: Motor function is intact.      Coordination: Coordination is intact.   Psychiatric:         Mood and Affect: Mood is anxious.         Behavior: Behavior normal.         Vital signs and nursing notes reviewed.            LAB RESULTS  Recent Results (from the past 24 hour(s))   ECG 12 Lead Syncope    Collection Time: 12/10/23 11:01 AM   Result Value Ref Range    QT Interval 419 ms    QTC Interval 456 ms   Protime-INR    Collection Time: 12/10/23 11:30 AM    Specimen: Blood   Result Value Ref Range    Protime 13.0 11.7 - 14.2 Seconds    INR 0.97 0.90 - 1.10   aPTT    Collection Time: 12/10/23 11:30 AM    Specimen: Blood   Result Value Ref Range    PTT 29.4 22.7 - 35.4 seconds   Comprehensive Metabolic Panel    Collection Time: 12/10/23 11:30 AM    Specimen: Blood   Result Value Ref Range    Glucose 97 65 - 99 mg/dL    BUN 12 6 - 20 mg/dL    Creatinine 0.96 0.76 - 1.27 mg/dL    Sodium 142 136 - 145 mmol/L    Potassium 3.6 3.5 - 5.2 mmol/L    Chloride 106 98 - 107 mmol/L    CO2 22.8 22.0 - 29.0 mmol/L    Calcium 9.1 8.6 - 10.5 mg/dL    Total Protein 6.7 6.0 - 8.5 g/dL    Albumin 4.2 3.5 - 5.2 g/dL    ALT (SGPT) 42 (H) 1 - 41 U/L    AST (SGOT) 22 1 - 40 U/L    Alkaline Phosphatase 66 39 - 117 U/L    Total Bilirubin 0.5 0.0 - 1.2 mg/dL    Globulin 2.5 gm/dL    A/G Ratio 1.7 g/dL    BUN/Creatinine Ratio 12.5 7.0 - 25.0    Anion Gap 13.2 5.0 - 15.0 mmol/L    eGFR 92.8 >60.0 mL/min/1.73   CBC Auto Differential    Collection Time: 12/10/23 11:30 AM    Specimen: Blood   Result Value Ref Range    WBC 6.72 3.40 - 10.80 10*3/mm3    RBC 4.70 4.14 - 5.80 10*6/mm3    Hemoglobin 12.4 (L) 13.0 - 17.7 g/dL    Hematocrit 39.1 37.5 - 51.0 %    MCV 83.2 79.0 - 97.0 fL    MCH 26.4 (L) 26.6 - 33.0 pg     MCHC 31.7 31.5 - 35.7 g/dL    RDW 12.8 12.3 - 15.4 %    RDW-SD 38.7 37.0 - 54.0 fl    MPV 9.6 6.0 - 12.0 fL    Platelets 283 140 - 450 10*3/mm3    Neutrophil % 58.7 42.7 - 76.0 %    Lymphocyte % 24.4 19.6 - 45.3 %    Monocyte % 8.9 5.0 - 12.0 %    Eosinophil % 7.3 (H) 0.3 - 6.2 %    Basophil % 0.4 0.0 - 1.5 %    Immature Grans % 0.3 0.0 - 0.5 %    Neutrophils, Absolute 3.94 1.70 - 7.00 10*3/mm3    Lymphocytes, Absolute 1.64 0.70 - 3.10 10*3/mm3    Monocytes, Absolute 0.60 0.10 - 0.90 10*3/mm3    Eosinophils, Absolute 0.49 (H) 0.00 - 0.40 10*3/mm3    Basophils, Absolute 0.03 0.00 - 0.20 10*3/mm3    Immature Grans, Absolute 0.02 0.00 - 0.05 10*3/mm3    nRBC 0.0 0.0 - 0.2 /100 WBC       Ordered the above labs and independently reviewed the results.        RADIOLOGY  CT Head Without Contrast    Result Date: 12/10/2023  CT OF THE BRAIN WITHOUT CONTRAST 12/10/2023  HISTORY: Fell. Head injury.  Axial images were obtained through the brain without intravenous contrast.  The brain parenchyma and ventricular system appear within normal limits. No mass lesions, midline shift, intracranial hemorrhage or evidence of infarction is demonstrated. No bony abnormalities are seen.      1. No acute process.  Radiation dose reduction techniques were utilized, including automated exposure control and exposure modulation based on body size.   This report was finalized on 12/10/2023 12:16 PM by Dr. Salvador Arita M.D on Workstation: [id number]      XR Chest 1 View    Result Date: 12/10/2023  XR CHEST 1 VW-  HISTORY: 58-year-old male with hypertension. Syncope.  FINDINGS: There is no evidence for pneumonia, effusions, or CHF. Follow-up with 2 views of the chest is recommended.  This report was finalized on 12/10/2023 11:41 AM by Dr. Kelsey Meade M.D on Workstation: [id number]       I ordered the above noted radiological studies. Independently reviewed by me and discussed with radiologist.  See dictation above for official radiology  interpretation.      PROCEDURES    Procedures        MEDICATIONS GIVEN IN ER    Medications   sodium chloride 0.9 % flush 10 mL (has no administration in time range)   lidocaine 1% - EPINEPHrine 1:218332 (XYLOCAINE W/EPI) 1 %-1:145411 injection 10 mL (has no administration in time range)   Tetanus-Diphth-Acell Pertussis (BOOSTRIX) injection 0.5 mL (0.5 mL Intramuscular Given 12/10/23 1143)         PROGRESS, DATA ANALYSIS, CONSULTS, AND MEDICAL DECISION MAKING    All labs have been independently interpreted by me.  All radiology studies have been interpreted by me.  Discussion below represents my analysis of pertinent findings related to patient's condition, differential diagnosis, treatment plan and final disposition.    Patient presentation and evaluation consistent with unexplained syncopal episode with head injury.  With numerous unexplained syncopal episodes recently after a stroke I feel he is most appropriately admitted to the hospital as opposed to being sent home.  I do have concerns that if I sent him home he would have recurrent falls.  Patient agreeable with this plan and all questions answered.    - Chronic or social conditions impacting care: None      DIFFERENTIAL DIAGNOSIS INCLUDE BUT NOT LIMITED TO:     Dehydration, intracranial bleeding, heart block      Orders placed during this visit:  Orders Placed This Encounter   Procedures    CT Head Without Contrast    XR Chest 1 View    Protime-INR    aPTT    Comprehensive Metabolic Panel    CBC Auto Differential    Orthostatic Vitals    LHA (on-call MD unless specified) Details    ECG 12 Lead Syncope    ECG 12 Lead Syncope    Insert Peripheral IV    Inpatient Admission    CBC & Differential         ED Course as of 12/10/23 1429   Sun Dec 10, 2023   1221 EKG          EKG time: 1101  Rhythm/Rate: Normal sinus, rate 71  P waves and LA: Normal P, normal LA  QRS, axis: Narrow QRS, normal axis  ST and T waves: No acute    Independently Interpreted by me  Not  significantly changed compared to prior 11/4/2023   [TR]   1222 CT Head Without Contrast  My independent interpretation of the CT of the head is no gross hemorrhage [TR]   1427 I discussed the case with MD Brandt with [facility] at this time regarding the patient.  I discussed work-up, results, concerns.  I discussed the consulting provider's desire for tele admit.    [DC]      ED Course User Index  [DC] Khurram Aly PA  [TR] Lemuel Ballesteros MD       AS OF 14:29 EST VITALS:    BP - (!) 171/103  HR - 54  TEMP - 97.3 °F (36.3 °C)  02 SATS - 97%        1429 I rechecked the patient.  I discussed the patient's labs, radiology findings (including all incidental findings), diagnosis, and plan for admission. The patient understands and agrees with the plan.    DIAGNOSIS  Final diagnoses:   Syncope and collapse   Poorly-controlled hypertension         DISPOSITION  Admit    Pt masked in first look. I wore a surgical mask throughout my encounters with the pt. I performed hand hygiene on entry into the pt room and upon exit.     Dictated utilizing Dragon dictation     Note Disclaimer: At [facility], we believe that sharing information builds trust and better relationships. You are receiving this note because you recently visited [facility]. It is possible you will see health information before a provider has talked with you about it. This kind of information can be easy to misunderstand. To help you fully understand what it means for your health, we urge you to discuss this note with your provider.      Khurram Aly PA  12/10/23 5529

## 2023-12-10 NOTE — H&P
Internal medicine history and physical  INTERNAL MEDICINE   [facility]       Patient Identification:  Name: Flako Coreas  Age: 58 y.o.  Sex: male  :  1965  MRN: [id number]                   Primary Care Physician: Akash Rodriguez Jr.,                                Date of admission:12/10/2023    Chief Complaint: Sudden onset of dizziness at around 10:00 this morning with loss of consciousness and fall and head injury.    History of Present Illness:   Patient is a 58-year-old male who has complicated past medical history including history of vertigo, ataxia, vertebral circulation CVA, prior history of alcohol use and anxiety who was recently hospitalized at acute rehab on 2023 after having been treated for acute CVA with worsening incoordination and vertigo.  In 2023 patient was noted to have left medial frontal cerebral hemisphere infarction and also noted to have rudimentary left posterior inferior cerebral artery.  At that time patient had a close head injury and concussion for which she was hospitalized and was discharged in improved and stable condition.  Patient did well since his discharge from the hospital on 2023 and was gradually improving with persistent unsteadiness episodes of occasional dizziness and speech issues until this morning when he woke up feeling fine.  He took his medicines at around 8:00 this morning couple hours after waking up and was feeling otherwise fine.  At around 10:00 he got up to go to the bathroom and in the hallways he suddenly felt significant dizziness and felt like he is going to pass out and before he could safely lowered himself to the floor he lost his consciousness and hit his head and right periorbital area.  Patient regained consciousness very quickly and eventually called his son and patient was brought to the emergency room for further evaluation.  In the emergency room patient was noted to have significantly  elevated blood pressure without any significant orthostatic changes.  Patient denies any confusion disorientation incontinence focal weakness of arm or legs.  Patient is being admitted for recurrent passing out spell and further workup.      Past Medical History:  Past Medical History:   Diagnosis Date    ADHD (attention deficit hyperactivity disorder)     On going issue    Anxiety     Lepe's esophagus     Benign prostatic hyperplasia Surgery in 12/2021    Clotting disorder Intestinal    Depression     Diverticulitis     Diverticulosis     Duodenitis     Enteritis     Failure to thrive (child)     Family history of blood clots     GERD (gastroesophageal reflux disease)     Hyperlipidemia     Hypertension     Hypertensive emergency     Low testosterone     Microcytosis     Pancreatitis     Pneumonia     PONV (postoperative nausea and vomiting)     Seasonal affective disorder     Shoulder injury     Stroke     Unexplained weight loss      Past Surgical History:  Past Surgical History:   Procedure Laterality Date    COLON SURGERY      COLONOSCOPY      COLONOSCOPY N/A 12/11/2022    Procedure: COLONOSCOPY INTO CECUM WITH HOT SNARE POLYPECTOMY;  Surgeon: Albert Gallo MD;  Location: [facility] ENDOSCOPY;  Service: Gastroenterology;  Laterality: N/A;  PRE- GI BLEED  POST- DIVERTICULOSIS, POLYP    ENDOSCOPY N/A 12/10/2022    Procedure: ESOPHAGOGASTRODUODENOSCOPY;  Surgeon: Albert Gallo MD;  Location: [facility] ENDOSCOPY;  Service: Gastroenterology;  Laterality: N/A;  PRE- DARK STOOLS  POST- BARRETTS ESOPHAGUS    FRACTURE SURGERY  1980    for broken arm    FRACTURE SURGERY      for broken hand    INCISION AND DRAINAGE ABSCESS  2015    anal    PROSTATE SURGERY      SMALL INTESTINE SURGERY      TONSILLECTOMY        Home Meds:  Medications Prior to Admission   Medication Sig Dispense Refill Last Dose    acetaminophen (TYLENOL) 325 MG tablet Take 2 tablets by mouth Every 6 (Six) Hours As Needed for Mild Pain.   Past Month     amLODIPine (NORVASC) 5 MG tablet Take 1 tablet by mouth Daily.   12/10/2023    aspirin 81 MG EC tablet Take 1 tablet by mouth Daily. 90 tablet 1 12/10/2023    atorvastatin (LIPITOR) 80 MG tablet Take 1 tablet by mouth Every Night. 30 tablet 0 12/9/2023    carvedilol (COREG) 6.25 MG tablet Take 1 tablet by mouth 2 (Two) Times a Day With Meals. 180 tablet 1 12/10/2023    folic acid (FOLVITE) 1 MG tablet Take 1 tablet by mouth Daily. 90 tablet 0 12/10/2023    hydrALAZINE (APRESOLINE) 100 MG tablet Take 1 tablet by mouth Every 8 (Eight) Hours. 270 tablet 1 12/10/2023    hydroCHLOROthiazide (HYDRODIURIL) 12.5 MG tablet Take 1 tablet by mouth Daily. 30 tablet 1 12/10/2023    hydrOXYzine (ATARAX) 25 MG tablet Take 1 tablet by mouth 3 (Three) Times a Day As Needed for Anxiety. 90 tablet 0 12/9/2023    lisinopril (PRINIVIL,ZESTRIL) 40 MG tablet Take 1 tablet by mouth Daily. 90 tablet 1 12/10/2023    melatonin 5 MG tablet tablet Take 1 tablet by mouth Every Night. 90 tablet 0 12/9/2023    sennosides-docusate (PERICOLACE) 8.6-50 MG per tablet Take 2 tablets by mouth 2 (Two) Times a Day. 120 tablet 0 12/10/2023    spironolactone (ALDACTONE) 25 MG tablet Take 1 tablet by mouth Daily. 90 tablet 1 12/10/2023    thiamine (VITAMIN B1) 100 MG tablet Take 1 tablet by mouth Daily. 90 tablet 0 12/10/2023    vilazodone (Viibryd) 40 MG tablet tablet Take 1 tablet by mouth Daily. 90 tablet 1 12/10/2023    multivitamin (One-Daily Multi-Vitamin) tablet tablet Take 1 tablet by mouth Daily. 90 tablet 0     pantoprazole (PROTONIX) 40 MG EC tablet Take 1 tablet by mouth Every Morning. 30 tablet 1      Current Meds:     Current Facility-Administered Medications:     acetaminophen (TYLENOL) tablet 650 mg, 650 mg, Oral, Q6H PRN, Chasidy Carlton MD    [START ON 12/11/2023] amLODIPine (NORVASC) tablet 5 mg, 5 mg, Oral, Daily, Chasidy Carlton MD    [START ON 12/11/2023] aspirin EC tablet 81 mg, 81 mg, Oral, Daily, Chasidy Carlton MD    atorvastatin  (LIPITOR) tablet 80 mg, 80 mg, Oral, Nightly, Chasidy Carlton MD    carvedilol (COREG) tablet 6.25 mg, 6.25 mg, Oral, BID With Meals, Chasidy Carlton MD, 6.25 mg at 12/10/23 1712    [START ON 12/11/2023] folic acid (FOLVITE) tablet 1 mg, 1 mg, Oral, Daily, Chasidy Carlton MD    hydrALAZINE (APRESOLINE) tablet 100 mg, 100 mg, Oral, Q8H, Chasidy Carlton MD    [START ON 12/11/2023] hydroCHLOROthiazide (HYDRODIURIL) tablet 12.5 mg, 12.5 mg, Oral, Daily, Chasidy Carlton MD    hydrOXYzine (ATARAX) tablet 25 mg, 25 mg, Oral, TID PRN, Chasidy Carlton MD, 25 mg at 12/10/23 1715    influenza vac split quad (FLUZONE,FLUARIX,AFLURIA,FLULAVAL) injection 0.5 mL, 0.5 mL, Intramuscular, During Hospitalization, Chasidy Carlton MD    lidocaine 1% - EPINEPHrine 1:599500 (XYLOCAINE W/EPI) 1 %-1:420376 injection 10 mL, 10 mL, Injection, Once, Khurram Aly PA    [START ON 12/11/2023] lisinopril (PRINIVIL,ZESTRIL) tablet 40 mg, 40 mg, Oral, Q24H, Chasidy Carlton MD    melatonin tablet 5 mg, 5 mg, Oral, Nightly, Chasidy Carlton MD    [START ON 12/11/2023] multivitamin (THERAGRAN) tablet 1 tablet, 1 tablet, Oral, Daily, Chasidy Carlton MD    [START ON 12/11/2023] pantoprazole (PROTONIX) EC tablet 40 mg, 40 mg, Oral, Q AM, Chasidy Carlton MD    sennosides-docusate (PERICOLACE) 8.6-50 MG per tablet 2 tablet, 2 tablet, Oral, BID, Chasidy Carlton MD    [COMPLETED] Insert Peripheral IV, , , Once **AND** sodium chloride 0.9 % flush 10 mL, 10 mL, Intravenous, PRN, Khurram Aly PA    [START ON 12/11/2023] spironolactone (ALDACTONE) tablet 25 mg, 25 mg, Oral, Daily, Chasidy Carlton MD    [START ON 12/11/2023] thiamine (VITAMIN B-1) tablet 100 mg, 100 mg, Oral, Daily, Chasidy Carlton MD    [START ON 12/11/2023] vilazodone (VIIBRYD) tablet 40 mg, 40 mg, Oral, Daily, Chasidy Carlton MD  Allergies:  No Known Allergies  Social History:   Social History     Tobacco Use    Smoking status: Never    Smokeless tobacco: Never   Substance Use Topics    Alcohol use: Not Currently  "    Comment: 12 beers on the weekend      Family History:  Family History   Problem Relation Age of Onset    Stroke Mother     Stroke Father           Review of Systems  See history of present illness and past medical history.  As described in history presenting illness.    Vitals:   /95 (BP Location: Right arm, Patient Position: Lying)   Pulse 72   Temp 98.1 °F (36.7 °C) (Oral)   Resp 18   Ht 188 cm (74.02\")   Wt 110 kg (241 lb 13.5 oz)   SpO2 100%   BMI 31.04 kg/m²   I/O: No intake or output data in the 24 hours ending 12/10/23 1853  Exam:  Patient is examined using the personal protective equipment as per guidelines from infection control for this particular patient as enacted.  Hand washing was performed before and after patient interaction.  General Appearance:    Alert, cooperative, no distress, appears stated age   Head:  Contusion of the right periorbital area there is a scabbed area above the midportion of the right eyebrow.   Eyes:    PERRL, conjunctiva/corneas clear, EOM's intact, both eyes   Ears:    Normal external ear canals, both ears   Nose:   Nares normal, septum midline, mucosa normal, no drainage    or sinus tenderness   Throat:   Lips, tongue, gums normal; oral mucosa pink and moist   Neck:   Supple, symmetrical, trachea midline, no adenopathy;     thyroid:  no enlargement/tenderness/nodules; no carotid    bruit or JVD   Back:     Symmetric, no curvature, ROM normal, no CVA tenderness   Lungs:     Clear to auscultation bilaterally, respirations unlabored   Chest Wall:    No tenderness or deformity    Heart:    Regular rate and rhythm, S1 and S2 normal, no murmur, rub   or gallop   Abdomen:     Soft, non-tender, bowel sounds active all four quadrants,     no masses, no hepatomegaly, no splenomegaly   Extremities:   Extremities normal, atraumatic, no cyanosis or edema   Pulses:   Pulses palpable in all extremities; symmetric all extremities   Skin:   Skin color normal, Skin is warm and " dry,  no rashes or palpable lesions   Neurologic:   CNII-XII intact, motor strength grossly intact, sensation grossly intact to light touch, no focal deficits noted       Data Review:      I reviewed the patient's new clinical results.  Results from last 7 days   Lab Units 12/10/23  1130   WBC 10*3/mm3 6.72   HEMOGLOBIN g/dL 12.4*   PLATELETS 10*3/mm3 283     Results from last 7 days   Lab Units 12/10/23  1130   SODIUM mmol/L 142   POTASSIUM mmol/L 3.6   CHLORIDE mmol/L 106   CO2 mmol/L 22.8   BUN mg/dL 12   CREATININE mg/dL 0.96   CALCIUM mg/dL 9.1   GLUCOSE mg/dL 97     CT Head Without Contrast    Result Date: 12/10/2023  1. No acute process.  Radiation dose reduction techniques were utilized, including automated exposure control and exposure modulation based on body size.   This report was finalized on 12/10/2023 12:16 PM by Dr. Salvador Arita M.D on Workstation: [id number]     ECG 12 Lead Syncope   Final Result   HEART RATE= 71  bpm   RR Interval= 845  ms   PA Interval= 151  ms   P Horizontal Axis= -32  deg   P Front Axis= -17  deg   QRSD Interval= 98  ms   QT Interval= 419  ms   QTcB= 456  ms   QRS Axis= 46  deg   T Wave Axis= 35  deg   - NORMAL ECG -   Sinus rhythm   When compared with ECG of 04-Nov-2023 3:33:25,   No significant change   Electronically Signed By: Hima Flores ([facility]) 10-Dec-2023 18:00:51   Date and Time of Study: 2023-12-10 11:01:42      ECG 12 Lead Syncope    (Results Pending)     Brief Urine Lab Results  (Last result in the past 365 days)        Color   Clarity   Blood   Leuk Est   Nitrite   Protein   CREAT   Urine HCG        08/16/23 1529 Yellow   Clear   Negative   Negative   Negative   Negative                   Assessment:  Active Hospital Problems    Diagnosis  POA    **CHI (closed head injury), initial encounter [S09.90XA]  Yes    Alcohol abuse [F10.10]  Yes    Fall [W19.XXXA]  Yes    Concussion [S06.0XAA]  Yes    Late effect of cerebrovascular accident (CVA) [I69.30]  Not Applicable     HTN (hypertension) [I10]  Yes    CVA (cerebral vascular accident) [I63.9]  Yes    Ataxia [R27.0]  Yes    Mixed anxiety depressive disorder [F41.8]  Yes       Medical decision making/care plan: See admitting orders  Acute closed head injury following syncopal episode with contusion of the periorbital area and superficial laceration of the left frontal area in the setting of prior CVA, unsteadiness and tendency to fall with resumption of consciousness very quickly after the episode is concerning for:  Vasovagal process versus   orthostatic drop in blood pressure versus   episodic arrhythmia versus   seizure. Plan is to admit the patient check orthostatics, control his blood pressure and get neurology and cardiology evaluation.  Monitor his cardiac rhythm for occult malignant arrhythmia such as SVT, VT or V-fib or atrial fibrillation that could contribute to this episodic dizziness and passing out spell.  Provide him with seizure precautions and monitor.  Recurrent syncopal episode with prior history of CVA-continue with all precautions physical therapy and continue his current regimen atorvastatin.  Poorly controlled hypertension-continue his home regimen aspirin and statin and allow for permissive hypertension in the first 24 hours and gradually bring the systolic blood pressure down from 180 and less to 130/80.    Major anxiety and active disorder-continue with current regimen and monitor.    Chasidy Carlton MD   12/10/2023  18:53 EST    Parts of this note may be an electronic transcription/translation of spoken language to printed text using the Dragon dictation system.

## 2023-12-10 NOTE — NURSING NOTE
Pt admitted for syncopal episode with Closed head injury. Pt has abrasion and cut to R cheek and forehead. Pt is alert and oriented x4 and stated that he had a syncopal episode on Friday as well but no injury so was not brought in for assessment. Pt on monitor and ortho BP to be done qshift

## 2023-12-10 NOTE — ED PROVIDER NOTES
MD ATTESTATION NOTE    The DON and I have discussed this patient's history, physical exam, and treatment plan.  I have reviewed the documentation and personally had a face to face interaction with the patient. I affirm the documentation and agree with the treatment and plan.  The attached note describes my personal findings.    I provided a substantive portion of the care of this patient. I personally performed the physical exam, in its entirety.    Independent Historians: Patient    A complete HPI/ROS/PMH/PSH/SH/FH are unobtainable due to: Nothing    Chronic or social conditions impacting patient care (social determinants of health): None    Flako Coreas is a 58 y.o. male who presents to the ED c/o acute syncopal episode.  The patient reports that he had a syncopal episode today.  He reports he had 1 last week as well.  He states this is his third syncopal episode of his life.  The third happened a few weeks ago.  He reports at that time he was admitted to the hospital and then went to rehab.  He states that he did not go to the hospital after last week's episode.  He states that he has a history of stroke and vertigo.  He states he was walking today and suddenly got very dizzy and then tried to lower himself to the ground but passed out.  He struck his head.  He denies any weakness or numbness.      Review of prior external notes (non-ED) -and- Review of prior external test results outside of this encounter: Laboratory evaluation 11/16/2023 shows normal BMP, normal CBC    Prescription drug monitoring program review:        On exam:  GENERAL: Awake, alert, no acute distress  SKIN: Warm, dry  HENT: Normocephalic, abrasion to the right forehead with minimal venous bleeding  EYES: no scleral icterus  CV: regular rhythm, regular rate  RESPIRATORY: normal effort, lungs clear  ABDOMEN: soft, nontender, nondistended  MUSCULOSKELETAL: no deformity, no tenderness to the shoulders, hips, knees, cervical spine  NEURO:  alert, moves all extremities, follows commands, equal upper and lower extremity strength and sensation                                                             Labs  Recent Results (from the past 24 hour(s))   ECG 12 Lead Syncope    Collection Time: 12/10/23 11:01 AM   Result Value Ref Range    QT Interval 419 ms    QTC Interval 456 ms   Protime-INR    Collection Time: 12/10/23 11:30 AM    Specimen: Blood   Result Value Ref Range    Protime 13.0 11.7 - 14.2 Seconds    INR 0.97 0.90 - 1.10   aPTT    Collection Time: 12/10/23 11:30 AM    Specimen: Blood   Result Value Ref Range    PTT 29.4 22.7 - 35.4 seconds   Comprehensive Metabolic Panel    Collection Time: 12/10/23 11:30 AM    Specimen: Blood   Result Value Ref Range    Glucose 97 65 - 99 mg/dL    BUN 12 6 - 20 mg/dL    Creatinine 0.96 0.76 - 1.27 mg/dL    Sodium 142 136 - 145 mmol/L    Potassium 3.6 3.5 - 5.2 mmol/L    Chloride 106 98 - 107 mmol/L    CO2 22.8 22.0 - 29.0 mmol/L    Calcium 9.1 8.6 - 10.5 mg/dL    Total Protein 6.7 6.0 - 8.5 g/dL    Albumin 4.2 3.5 - 5.2 g/dL    ALT (SGPT) 42 (H) 1 - 41 U/L    AST (SGOT) 22 1 - 40 U/L    Alkaline Phosphatase 66 39 - 117 U/L    Total Bilirubin 0.5 0.0 - 1.2 mg/dL    Globulin 2.5 gm/dL    A/G Ratio 1.7 g/dL    BUN/Creatinine Ratio 12.5 7.0 - 25.0    Anion Gap 13.2 5.0 - 15.0 mmol/L    eGFR 92.8 >60.0 mL/min/1.73   CBC Auto Differential    Collection Time: 12/10/23 11:30 AM    Specimen: Blood   Result Value Ref Range    WBC 6.72 3.40 - 10.80 10*3/mm3    RBC 4.70 4.14 - 5.80 10*6/mm3    Hemoglobin 12.4 (L) 13.0 - 17.7 g/dL    Hematocrit 39.1 37.5 - 51.0 %    MCV 83.2 79.0 - 97.0 fL    MCH 26.4 (L) 26.6 - 33.0 pg    MCHC 31.7 31.5 - 35.7 g/dL    RDW 12.8 12.3 - 15.4 %    RDW-SD 38.7 37.0 - 54.0 fl    MPV 9.6 6.0 - 12.0 fL    Platelets 283 140 - 450 10*3/mm3    Neutrophil % 58.7 42.7 - 76.0 %    Lymphocyte % 24.4 19.6 - 45.3 %    Monocyte % 8.9 5.0 - 12.0 %    Eosinophil % 7.3 (H) 0.3 - 6.2 %    Basophil % 0.4 0.0 -  1.5 %    Immature Grans % 0.3 0.0 - 0.5 %    Neutrophils, Absolute 3.94 1.70 - 7.00 10*3/mm3    Lymphocytes, Absolute 1.64 0.70 - 3.10 10*3/mm3    Monocytes, Absolute 0.60 0.10 - 0.90 10*3/mm3    Eosinophils, Absolute 0.49 (H) 0.00 - 0.40 10*3/mm3    Basophils, Absolute 0.03 0.00 - 0.20 10*3/mm3    Immature Grans, Absolute 0.02 0.00 - 0.05 10*3/mm3    nRBC 0.0 0.0 - 0.2 /100 WBC       Radiology  CT Head Without Contrast    Result Date: 12/10/2023  CT OF THE BRAIN WITHOUT CONTRAST 12/10/2023  HISTORY: Fell. Head injury.  Axial images were obtained through the brain without intravenous contrast.  The brain parenchyma and ventricular system appear within normal limits. No mass lesions, midline shift, intracranial hemorrhage or evidence of infarction is demonstrated. No bony abnormalities are seen.      1. No acute process.  Radiation dose reduction techniques were utilized, including automated exposure control and exposure modulation based on body size.   This report was finalized on 12/10/2023 12:16 PM by Dr. Salvador Arita M.D on Workstation: [id number]      XR Chest 1 View    Result Date: 12/10/2023  XR CHEST 1 VW-  HISTORY: 58-year-old male with hypertension. Syncope.  FINDINGS: There is no evidence for pneumonia, effusions, or CHF. Follow-up with 2 views of the chest is recommended.  This report was finalized on 12/10/2023 11:41 AM by Dr. Kelsey Maede M.D on Workstation: [id number]       Medical Decision Making:  ED Course as of 12/10/23 1715   Sun Dec 10, 2023   1221 EKG          EKG time: 1101  Rhythm/Rate: Normal sinus, rate 71  P waves and MO: Normal P, normal MO  QRS, axis: Narrow QRS, normal axis  ST and T waves: No acute    Independently Interpreted by me  Not significantly changed compared to prior 11/4/2023   [TR]   1222 CT Head Without Contrast  My independent interpretation of the CT of the head is no gross hemorrhage [TR]   1427 I discussed the case with MD Brandt with A at this time regarding the  patient.  I discussed work-up, results, concerns.  I discussed the consulting provider's desire for tele admit.    [DC]      ED Course User Index  [DC] Khurram Aly PA  [TR] Lemuel Ballesteros MD       Clinical Scores:              The patient presents with a acute syncopal episode.  This is in the setting of a complex medical history.  His discharge summary from 11/17/2023 notes that he had a closed head injury likely concussion from a fall and had no new stroke.  The loss of consciousness is reportedly new.  Plan to obtain chemistries and blood counts as well as CT of his head and chest x-ray and EKG.  My differential diagnosis includes arrhythmia, stroke, intracranial hemorrhage, and others.    Procedures:  Procedures                Diagnosis  Final diagnoses:   Syncope and collapse   Poorly-controlled hypertension       Note Disclaimer: At [facility], we believe that sharing information builds trust and better relationships. You are receiving this note because you recently visited [facility]. It is possible you will see health information before a provider has talked with you about it. This kind of information can be easy to misunderstand. To help you fully understand what it means for your health, we urge you to discuss this note with your provider.       Lemuel Ballesteros MD  12/10/23 [id number]

## 2023-12-10 NOTE — PLAN OF CARE
Goal Outcome Evaluation:     Problem: Adult Inpatient Plan of Care  Goal: Plan of Care Review  Outcome: Ongoing, Progressing  Goal: Patient-Specific Goal (Individualized)  Outcome: Ongoing, Progressing  Goal: Absence of Hospital-Acquired Illness or Injury  Outcome: Ongoing, Progressing  Goal: Optimal Comfort and Wellbeing  Outcome: Ongoing, Progressing  Intervention: Provide Person-Centered Care  Recent Flowsheet Documentation  Taken 12/10/2023 1600 by Danna Guidry RN  Trust Relationship/Rapport:   care explained   choices provided  Goal: Readiness for Transition of Care  Outcome: Ongoing, Progressing  Intervention: Mutually Develop Transition Plan  Recent Flowsheet Documentation  Taken 12/10/2023 1555 by Danna Guidry, RN  Equipment Currently Used at Home: walker, standard  Taken 12/10/2023 1550 by Danna Guidry, RN  Transportation Anticipated: family or friend will provide  Patient/Family Anticipated Services at Transition: home health care  Patient/Family Anticipates Transition to: home with family     Problem: Hypertension Comorbidity  Goal: Blood Pressure in Desired Range  Outcome: Ongoing, Progressing     Problem: Fall Injury Risk  Goal: Absence of Fall and Fall-Related Injury  Outcome: Ongoing, Progressing     Problem: Pain Acute  Goal: Acceptable Pain Control and Functional Ability  Outcome: Ongoing, Progressing

## 2023-12-10 NOTE — ED NOTES
Pt to ER via [facility] from home for fall/syncopal episode this morning. Pt has stroke hx and states he has had several recent falls. States he is on a blood thinner but doesn't remember which one

## 2023-12-10 NOTE — ED NOTES
Nursing report ED to floor  Flako Coreas  58 y.o.  male    HPI :   Chief Complaint   Patient presents with    Syncope    Fall       Admitting doctor:   Chasidy Carlton MD    Admitting diagnosis:   The primary encounter diagnosis was Syncope and collapse. A diagnosis of Poorly-controlled hypertension was also pertinent to this visit.    Code status:   Current Code Status       Date Active Code Status Order ID Comments User Context       Prior            Allergies:   Patient has no known allergies.    Isolation:   No active isolations    Intake and Output  No intake or output data in the 24 hours ending 12/10/23 1437    Weight:       12/10/23  1056   Weight: 105 kg (231 lb 7.7 oz)       Most recent vitals:   Vitals:    12/10/23 1222 12/10/23 1223 12/10/23 1226 12/10/23 1344   BP:  (!) 180/120 (!) 182/124 (!) 171/103   Patient Position:  Standing Standing    Pulse: 60 68 65 54   Resp:       Temp:       SpO2: 98%   97%   Weight:       Height:           Active LDAs/IV Access:   Lines, Drains & Airways       Active LDAs       Name Placement date Placement time Site Days    Peripheral IV 12/10/23 1131 Left Antecubital 12/10/23  1131  Antecubital  less than 1                    Labs (abnormal labs have a star):   Labs Reviewed   COMPREHENSIVE METABOLIC PANEL - Abnormal; Notable for the following components:       Result Value    ALT (SGPT) 42 (*)     All other components within normal limits    Narrative:     GFR Normal >60  Chronic Kidney Disease <60  Kidney Failure <15     CBC WITH AUTO DIFFERENTIAL - Abnormal; Notable for the following components:    Hemoglobin 12.4 (*)     MCH 26.4 (*)     Eosinophil % 7.3 (*)     Eosinophils, Absolute 0.49 (*)     All other components within normal limits   PROTIME-INR - Normal   APTT - Normal   CBC AND DIFFERENTIAL    Narrative:     The following orders were created for panel order CBC & Differential.  Procedure                               Abnormality         Status                      ---------                               -----------         ------                     CBC Auto Differential[[id number]]        Abnormal            Final result                 Please view results for these tests on the individual orders.       EKG:   ECG 12 Lead Syncope   Preliminary Result   HEART RATE= 71  bpm   RR Interval= 845  ms   LA Interval= 151  ms   P Horizontal Axis= -32  deg   P Front Axis= -17  deg   QRSD Interval= 98  ms   QT Interval= 419  ms   QTcB= 456  ms   QRS Axis= 46  deg   T Wave Axis= 35  deg   - NORMAL ECG -   Sinus rhythm   Electronically Signed By:    Date and Time of Study: 2023-12-10 11:01:42      ECG 12 Lead Syncope    (Results Pending)       Meds given in ED:   Medications   sodium chloride 0.9 % flush 10 mL (has no administration in time range)   lidocaine 1% - EPINEPHrine 1:041378 (XYLOCAINE W/EPI) 1 %-1:046174 injection 10 mL (10 mL Injection Not Given 12/10/23 1436)   Tetanus-Diphth-Acell Pertussis (BOOSTRIX) injection 0.5 mL (0.5 mL Intramuscular Given 12/10/23 1143)       Imaging results:  CT Head Without Contrast    Result Date: 12/10/2023  1. No acute process.  Radiation dose reduction techniques were utilized, including automated exposure control and exposure modulation based on body size.   This report was finalized on 12/10/2023 12:16 PM by Dr. Salvador Arita M.D on Workstation: [id number]       Ambulatory status:   - adlib    Social issues:   Social History     Socioeconomic History    Marital status: Single   Tobacco Use    Smoking status: Never    Smokeless tobacco: Never   Vaping Use    Vaping Use: Never used   Substance and Sexual Activity    Alcohol use: Not Currently     Comment: 12 beers on the weekend    Drug use: Not Currently     Frequency: 1.0 times per week     Types: Marijuana     Comment: patient states he has been smoking marijuania daily x 2 weeks    Sexual activity: Defer     Partners: Female       NIH Stroke Scale:       Aure Iyer RN  12/10/23 14:37  EST

## 2023-12-11 ENCOUNTER — APPOINTMENT (OUTPATIENT)
Dept: OCCUPATIONAL THERAPY | Facility: HOSPITAL | Age: 58
End: 2023-12-11
Payer: MEDICAID

## 2023-12-11 ENCOUNTER — APPOINTMENT (OUTPATIENT)
Dept: SPEECH THERAPY | Facility: HOSPITAL | Age: 58
End: 2023-12-11
Payer: MEDICAID

## 2023-12-11 ENCOUNTER — APPOINTMENT (OUTPATIENT)
Dept: PHYSICAL THERAPY | Facility: HOSPITAL | Age: 58
End: 2023-12-11
Payer: MEDICAID

## 2023-12-11 LAB
ALBUMIN SERPL-MCNC: 4.2 G/DL (ref 3.5–5.2)
ALBUMIN/GLOB SERPL: 1.7 G/DL
ALP SERPL-CCNC: 69 U/L (ref 39–117)
ALT SERPL W P-5'-P-CCNC: 56 U/L (ref 1–41)
ANION GAP SERPL CALCULATED.3IONS-SCNC: 13.3 MMOL/L (ref 5–15)
AST SERPL-CCNC: 36 U/L (ref 1–40)
BILIRUB SERPL-MCNC: 0.7 MG/DL (ref 0–1.2)
BUN SERPL-MCNC: 11 MG/DL (ref 6–20)
BUN/CREAT SERPL: 11.2 (ref 7–25)
CALCIUM SPEC-SCNC: 9.5 MG/DL (ref 8.6–10.5)
CHLORIDE SERPL-SCNC: 106 MMOL/L (ref 98–107)
CO2 SERPL-SCNC: 22.7 MMOL/L (ref 22–29)
CREAT SERPL-MCNC: 0.98 MG/DL (ref 0.76–1.27)
EGFRCR SERPLBLD CKD-EPI 2021: 90.5 ML/MIN/1.73
GLOBULIN UR ELPH-MCNC: 2.5 GM/DL
GLUCOSE SERPL-MCNC: 112 MG/DL (ref 65–99)
HBA1C MFR BLD: 5.5 % (ref 4.8–5.6)
POTASSIUM SERPL-SCNC: 4.6 MMOL/L (ref 3.5–5.2)
PROT SERPL-MCNC: 6.7 G/DL (ref 6–8.5)
SODIUM SERPL-SCNC: 142 MMOL/L (ref 136–145)

## 2023-12-11 PROCEDURE — G0378 HOSPITAL OBSERVATION PER HR: HCPCS

## 2023-12-11 PROCEDURE — 97162 PT EVAL MOD COMPLEX 30 MIN: CPT

## 2023-12-11 PROCEDURE — 99254 IP/OBS CNSLTJ NEW/EST MOD 60: CPT

## 2023-12-11 PROCEDURE — 97530 THERAPEUTIC ACTIVITIES: CPT

## 2023-12-11 PROCEDURE — 83036 HEMOGLOBIN GLYCOSYLATED A1C: CPT | Performed by: INTERNAL MEDICINE

## 2023-12-11 PROCEDURE — 80053 COMPREHEN METABOLIC PANEL: CPT | Performed by: INTERNAL MEDICINE

## 2023-12-11 PROCEDURE — 97166 OT EVAL MOD COMPLEX 45 MIN: CPT

## 2023-12-11 RX ADMIN — FOLIC ACID 1 MG: 1 TABLET ORAL at 08:46

## 2023-12-11 RX ADMIN — Medication 1 TABLET: at 08:46

## 2023-12-11 RX ADMIN — Medication 100 MG: at 08:46

## 2023-12-11 RX ADMIN — Medication 10 ML: at 08:47

## 2023-12-11 RX ADMIN — ACETAMINOPHEN 650 MG: 325 TABLET, FILM COATED ORAL at 08:46

## 2023-12-11 RX ADMIN — HYDRALAZINE HYDROCHLORIDE 100 MG: 50 TABLET, FILM COATED ORAL at 05:23

## 2023-12-11 RX ADMIN — HYDROXYZINE HYDROCHLORIDE 25 MG: 25 TABLET ORAL at 06:27

## 2023-12-11 RX ADMIN — ATORVASTATIN CALCIUM 80 MG: 80 TABLET, FILM COATED ORAL at 21:13

## 2023-12-11 RX ADMIN — HYDROCHLOROTHIAZIDE 12.5 MG: 12.5 TABLET ORAL at 08:46

## 2023-12-11 RX ADMIN — SPIRONOLACTONE 25 MG: 25 TABLET, FILM COATED ORAL at 08:46

## 2023-12-11 RX ADMIN — HYDROXYZINE HYDROCHLORIDE 25 MG: 25 TABLET ORAL at 14:25

## 2023-12-11 RX ADMIN — HYDRALAZINE HYDROCHLORIDE 100 MG: 50 TABLET, FILM COATED ORAL at 21:13

## 2023-12-11 RX ADMIN — LISINOPRIL 40 MG: 40 TABLET ORAL at 08:46

## 2023-12-11 RX ADMIN — AMLODIPINE BESYLATE 5 MG: 5 TABLET ORAL at 08:46

## 2023-12-11 RX ADMIN — PANTOPRAZOLE SODIUM 40 MG: 40 TABLET, DELAYED RELEASE ORAL at 05:23

## 2023-12-11 RX ADMIN — DOCUSATE SODIUM 50MG AND SENNOSIDES 8.6MG 2 TABLET: 8.6; 5 TABLET, FILM COATED ORAL at 08:46

## 2023-12-11 RX ADMIN — Medication 5 MG: at 21:13

## 2023-12-11 RX ADMIN — ACETAMINOPHEN 650 MG: 325 TABLET, FILM COATED ORAL at 14:25

## 2023-12-11 RX ADMIN — DOCUSATE SODIUM 50MG AND SENNOSIDES 8.6MG 2 TABLET: 8.6; 5 TABLET, FILM COATED ORAL at 21:13

## 2023-12-11 RX ADMIN — ASPIRIN 81 MG: 81 TABLET, COATED ORAL at 08:46

## 2023-12-11 RX ADMIN — CARVEDILOL 6.25 MG: 6.25 TABLET, FILM COATED ORAL at 18:09

## 2023-12-11 RX ADMIN — CARVEDILOL 6.25 MG: 6.25 TABLET, FILM COATED ORAL at 08:46

## 2023-12-11 RX ADMIN — HYDRALAZINE HYDROCHLORIDE 100 MG: 50 TABLET, FILM COATED ORAL at 14:23

## 2023-12-11 RX ADMIN — Medication 10 ML: at 21:13

## 2023-12-11 RX ADMIN — VILAZODONE HYDROCHLORIDE 40 MG: 40 TABLET, FILM COATED ORAL at 08:46

## 2023-12-11 NOTE — CONSULTS
Date of Consultation: 23    Referral Provider: Chasidy Carlton MD     Reason for Consultation: Syncope    Encounter Provider: JANEEN Ty    Group of Service: [facility]     Patient Name: Flako Coreas    :1965    Chief complaint: Syncope, closed head injury      History of Present Illness:  Flako Coreas is a 58 year old who follows with Dr. Taylor and has a past medical history that is significant for ADHD, ataxia, BPH, left cerebellar stroke, GERD with Lepe's esophagus, HLD, and HTN (on amlodipine, carvedilol, hydralazine, hydrochlorothiazide, lisinopril, and spironolactone).  He was hospitalized in 2023 after a fall at home. He was found to be hypertensive with an acute CVA. He was discharged on Plavix (but transitioned to Brilinta because he failed Plavix). He presented again to the ED in 2023 with complaints of a fall and left sided facial and hand numbness. Imaging was negative for new stroke and he was felt to have a closed head injury (likely concussion) from his fall. He was then admitted to [facility], [place], operated by [facility] from 23-23 for treatment of his worsening incoordination and vertigo.     He had been doing well at home until 12/10/23 when he had a syncopal episode at home. He hit his head and right periorbital area during the fall. This is his third syncopal episode in the last week. His first episode occurred after he got out of a hot bath. The second occurred after he got up from the couch and walked 8 feet. His most recent occurred when he was bent over looking for something in the closet. He does have some warning before his syncope, he typically feels dizzy and nauseous and can attempt to lower himself to the ground before losing consciousness.     In the ED he was found to have elevated blood pressure (171/115) and orthostatic vitals were negative. CT of the head showed no acute findings. His EKG showed sinus rhythm with a rate of 71.      MEGHANN 8/22/23    Left ventricular systolic function is hyperdynamic (EF > 70%).    Left ventricular wall thickness is consistent with mild to moderate concentric hypertrophy.    Left ventricular diastolic function is consistent with (grade I) impaired relaxation.    Normal right ventricular cavity size and systolic function noted.    The left atrial cavity is mildly dilated.    No evidence of a left atrial appendage thrombus was present    No evidence of a patent foramen ovale. Saline test results are negative.    There are very mild plaques in the distal descending thoracic aorta. There are no plaques in the aortic arch or ascending aorta    There is no evidence of pericardial effusion    Treadmill Stress Test 10/2/23    Equivocal ECG evidence of myocardial ischemia.  This was a submaximal stress test.  73% of age-predicted heart rate achieved.    Negative clinical evidence of myocardial ischemia.    Past Medical History:   Diagnosis Date    ADHD (attention deficit hyperactivity disorder)     On going issue    Anxiety     Lepe's esophagus     Benign prostatic hyperplasia Surgery in 12/2021    Clotting disorder Intestinal    Depression     Diverticulitis     Diverticulosis     Duodenitis     Enteritis     Failure to thrive (child)     Family history of blood clots     GERD (gastroesophageal reflux disease)     Hyperlipidemia     Hypertension     Hypertensive emergency     Low testosterone     Microcytosis     Pancreatitis     Pneumonia     PONV (postoperative nausea and vomiting)     Seasonal affective disorder     Shoulder injury     Stroke     Unexplained weight loss          Past Surgical History:   Procedure Laterality Date    COLON SURGERY      COLONOSCOPY      COLONOSCOPY N/A 12/11/2022    Procedure: COLONOSCOPY INTO CECUM WITH HOT SNARE POLYPECTOMY;  Surgeon: Albert Gallo MD;  Location: [facility] ENDOSCOPY;  Service: Gastroenterology;  Laterality: N/A;  PRE- GI BLEED  POST- DIVERTICULOSIS, POLYP     ENDOSCOPY N/A 12/10/2022    Procedure: ESOPHAGOGASTRODUODENOSCOPY;  Surgeon: Albert Gallo MD;  Location: [facility] ENDOSCOPY;  Service: Gastroenterology;  Laterality: N/A;  PRE- DARK STOOLS  POST- BARRETTS ESOPHAGUS    FRACTURE SURGERY  1980    for broken arm    FRACTURE SURGERY      for broken hand    INCISION AND DRAINAGE ABSCESS  2015    anal    PROSTATE SURGERY      SMALL INTESTINE SURGERY      TONSILLECTOMY           No Known Allergies      No current facility-administered medications on file prior to encounter.     Current Outpatient Medications on File Prior to Encounter   Medication Sig Dispense Refill    acetaminophen (TYLENOL) 325 MG tablet Take 2 tablets by mouth Every 6 (Six) Hours As Needed for Mild Pain.      amLODIPine (NORVASC) 5 MG tablet Take 1 tablet by mouth Daily.      aspirin 81 MG EC tablet Take 1 tablet by mouth Daily. 90 tablet 1    atorvastatin (LIPITOR) 80 MG tablet Take 1 tablet by mouth Every Night. 30 tablet 0    carvedilol (COREG) 6.25 MG tablet Take 1 tablet by mouth 2 (Two) Times a Day With Meals. 180 tablet 1    folic acid (FOLVITE) 1 MG tablet Take 1 tablet by mouth Daily. 90 tablet 0    hydrALAZINE (APRESOLINE) 100 MG tablet Take 1 tablet by mouth Every 8 (Eight) Hours. 270 tablet 1    hydroCHLOROthiazide (HYDRODIURIL) 12.5 MG tablet Take 1 tablet by mouth Daily. 30 tablet 1    hydrOXYzine (ATARAX) 25 MG tablet Take 1 tablet by mouth 3 (Three) Times a Day As Needed for Anxiety. 90 tablet 0    lisinopril (PRINIVIL,ZESTRIL) 40 MG tablet Take 1 tablet by mouth Daily. 90 tablet 1    melatonin 5 MG tablet tablet Take 1 tablet by mouth Every Night. 90 tablet 0    sennosides-docusate (PERICOLACE) 8.6-50 MG per tablet Take 2 tablets by mouth 2 (Two) Times a Day. 120 tablet 0    spironolactone (ALDACTONE) 25 MG tablet Take 1 tablet by mouth Daily. 90 tablet 1    thiamine (VITAMIN B1) 100 MG tablet Take 1 tablet by mouth Daily. 90 tablet 0    vilazodone (Viibryd) 40 MG tablet tablet Take 1  "tablet by mouth Daily. 90 tablet 1    multivitamin (One-Daily Multi-Vitamin) tablet tablet Take 1 tablet by mouth Daily. 90 tablet 0    pantoprazole (PROTONIX) 40 MG EC tablet Take 1 tablet by mouth Every Morning. 30 tablet 1         Social History     Socioeconomic History    Marital status: Single   Tobacco Use    Smoking status: Never    Smokeless tobacco: Never   Vaping Use    Vaping Use: Never used   Substance and Sexual Activity    Alcohol use: Not Currently     Comment: 12 beers on the weekend    Drug use: Not Currently     Frequency: 1.0 times per week     Types: Marijuana     Comment: patient states he has been smoking marijuania daily x 2 weeks    Sexual activity: Defer     Partners: Female         Family History   Problem Relation Age of Onset    Stroke Mother     Stroke Father        REVIEW OF SYSTEMS:   12 point ROS was performed and is negative except as outlined in HPI       Objective:     Vitals:    12/10/23 2145 12/10/23 2150 12/10/23 2155 12/11/23 0520   BP: 151/95 171/98 (!) 173/101 (!) 167/103   BP Location: Left arm Left arm Left arm Left arm   Patient Position: Lying Sitting Standing Lying   Pulse: 65 63 71 59   Resp: 16 16 16 16   Temp:    97.9 °F (36.6 °C)   TempSrc:    Oral   SpO2: 97% 97% 95% 97%   Weight:       Height:         Body mass index is 31.04 kg/m².  Flowsheet Rows      Flowsheet Row First Filed Value   Admission Height 188 cm (74.02\") Documented at 12/10/2023 1056   Admission Weight 105 kg (231 lb 7.7 oz) Documented at 12/10/2023 1056              Physical Exam  Constitutional:       General: He is not in acute distress.  HENT:      Head: Normocephalic.      Nose: Nose normal.   Eyes:      Extraocular Movements: Extraocular movements intact.      Pupils: Pupils are equal, round, and reactive to light.   Neck:      Vascular: No carotid bruit.   Cardiovascular:      Rate and Rhythm: Normal rate and regular rhythm.      Pulses: Normal pulses.      Heart sounds: Normal heart sounds. " Heart sounds not distant. No murmur heard.     No friction rub. No gallop. No S3 or S4 sounds.   Pulmonary:      Effort: Pulmonary effort is normal.      Breath sounds: Normal breath sounds.   Abdominal:      General: Abdomen is flat. Bowel sounds are normal.      Palpations: Abdomen is soft.   Musculoskeletal:      Cervical back: Normal range of motion.      Right lower leg: No edema.      Left lower leg: No edema.   Skin:     General: Skin is warm and dry.   Neurological:      General: No focal deficit present.      Mental Status: He is alert and oriented to person, place, and time. Mental status is at baseline.   Psychiatric:         Mood and Affect: Mood normal.         Behavior: Behavior normal.         Thought Content: Thought content normal.         Judgment: Judgment normal.         Lab Review:                Results from last 7 days   Lab Units 12/11/23  0452   SODIUM mmol/L 142   POTASSIUM mmol/L 4.6   CHLORIDE mmol/L 106   CO2 mmol/L 22.7   BUN mg/dL 11   CREATININE mg/dL 0.98   GLUCOSE mg/dL 112*   CALCIUM mg/dL 9.5         Results from last 7 days   Lab Units 12/10/23  1130   WBC 10*3/mm3 6.72   HEMOGLOBIN g/dL 12.4*   HEMATOCRIT % 39.1   PLATELETS 10*3/mm3 283     Results from last 7 days   Lab Units 12/10/23  1130   INR  0.97   APTT seconds 29.4                   EKG (reviewed by me personally):                Assessment/Plan:   Syncope with closed head injury - he has had three syncopal episodes in the last week. There is concern for post-concussive encephalopathy/postconcussion syndrome. Neurology is also consulted. His syncopal episodes have been related to posture changes.  Hypertension - poorly controlled. He is at risk for orthostasis due to his medication regimen. His orthostatics were negative today but he has been off of his medications. I agree with restarting his medications and checking orthostatics in the morning. I also agree with secondary hypertension workup. He had a normal renal  artery ultrasound in December 2022, if still concerned for renal artery stenosis would recommend a CT to evaluate.   Hyperlipidemia - on atorvastatin 80 mg   History of cerebellar stroke   Ataxia     I reviewed his telemetry, no events overnight.  His anti-hypertensive's are being restarted  and his orthostatic vitals will be re-evaluated in the morning. I have also ordered a 24 hour urine to assess his catecholamine level. I discussed the patient with Dr. Foster.     Judy Madden, APRN   12/11/23

## 2023-12-11 NOTE — PLAN OF CARE
Problem: Adult Inpatient Plan of Care  Goal: Plan of Care Review  Outcome: Ongoing, Progressing  Flowsheets (Taken 12/11/2023 0407)  Plan of Care Reviewed With: patient  Goal: Patient-Specific Goal (Individualized)  Outcome: Ongoing, Progressing  Goal: Absence of Hospital-Acquired Illness or Injury  Outcome: Ongoing, Progressing  Intervention: Identify and Manage Fall Risk  Recent Flowsheet Documentation  Taken 12/11/2023 0406 by Ashley Marinelli RN  Safety Promotion/Fall Prevention: safety round/check completed  Taken 12/11/2023 0212 by Ashley Marinelli RN  Safety Promotion/Fall Prevention: safety round/check completed  Taken 12/11/2023 0000 by Ashley Marinelli RN  Safety Promotion/Fall Prevention: safety round/check completed  Taken 12/10/2023 2205 by Ashley Marinelli RN  Safety Promotion/Fall Prevention: safety round/check completed  Taken 12/10/2023 2200 by Ashley Marinelli RN  Safety Promotion/Fall Prevention: safety round/check completed  Taken 12/10/2023 2000 by Ashley Marinelli RN  Safety Promotion/Fall Prevention: safety round/check completed  Intervention: Prevent Skin Injury  Recent Flowsheet Documentation  Taken 12/11/2023 0406 by Ashley Marinelli RN  Body Position: position changed independently  Taken 12/11/2023 0212 by Ashley Marinelli RN  Body Position: position changed independently  Taken 12/11/2023 0000 by Ashley Marinelli RN  Body Position: position changed independently  Taken 12/10/2023 2205 by Ashley Marinelli RN  Body Position: position changed independently  Taken 12/10/2023 2200 by Ashley Marinelli RN  Body Position: position changed independently  Taken 12/10/2023 2000 by Ashley Marinelli RN  Body Position: position changed independently  Skin Protection: adhesive use limited  Intervention: Prevent and Manage VTE (Venous Thromboembolism) Risk  Recent Flowsheet Documentation  Taken 12/10/2023 2000 by Ashley Marinelli RN  VTE Prevention/Management: patient refused intervention  Range of Motion: active  ROM (range of motion) encouraged  Intervention: Prevent Infection  Recent Flowsheet Documentation  Taken 12/10/2023 2000 by Ashley Marinelli RN  Infection Prevention: single patient room provided  Goal: Optimal Comfort and Wellbeing  Outcome: Ongoing, Progressing  Intervention: Provide Person-Centered Care  Recent Flowsheet Documentation  Taken 12/10/2023 2000 by Ashley Marinelli RN  Trust Relationship/Rapport:   choices provided   care explained  Goal: Readiness for Transition of Care  Outcome: Ongoing, Progressing     Problem: Hypertension Comorbidity  Goal: Blood Pressure in Desired Range  Outcome: Ongoing, Progressing  Intervention: Maintain Blood Pressure Management  Recent Flowsheet Documentation  Taken 12/11/2023 0406 by Ashley Marinelli RN  Medication Review/Management: medications reviewed  Taken 12/11/2023 0212 by Ashley Marinelli RN  Medication Review/Management: medications reviewed  Taken 12/11/2023 0000 by Ashley Marinelli RN  Medication Review/Management: medications reviewed  Taken 12/10/2023 2205 by Ashley Marinelli RN  Medication Review/Management: medications reviewed  Taken 12/10/2023 2200 by Ashley Marinelli RN  Medication Review/Management: medications reviewed  Taken 12/10/2023 2000 by Ashley Marinelli RN  Syncope Management: head lowered  Medication Review/Management: medications reviewed     Problem: Fall Injury Risk  Goal: Absence of Fall and Fall-Related Injury  Outcome: Ongoing, Progressing  Intervention: Identify and Manage Contributors  Recent Flowsheet Documentation  Taken 12/11/2023 0406 by Ashley Marinelli RN  Medication Review/Management: medications reviewed  Taken 12/11/2023 0212 by Ashley Marinelli RN  Medication Review/Management: medications reviewed  Taken 12/11/2023 0000 by Ashley Marinelli RN  Medication Review/Management: medications reviewed  Taken 12/10/2023 2205 by Ashley Marinelli RN  Medication Review/Management: medications reviewed  Taken 12/10/2023 2200 by Ashley Marinelli  RN  Medication Review/Management: medications reviewed  Taken 12/10/2023 2000 by Ashley Marinelli RN  Medication Review/Management: medications reviewed  Intervention: Promote Injury-Free Environment  Recent Flowsheet Documentation  Taken 12/11/2023 0406 by Ashley Marinelli RN  Safety Promotion/Fall Prevention: safety round/check completed  Taken 12/11/2023 0212 by Ashley Marinelli RN  Safety Promotion/Fall Prevention: safety round/check completed  Taken 12/11/2023 0000 by Ashley Marinelli RN  Safety Promotion/Fall Prevention: safety round/check completed  Taken 12/10/2023 2205 by Ashley Marinelli RN  Safety Promotion/Fall Prevention: safety round/check completed  Taken 12/10/2023 2200 by Ashley Marinelli RN  Safety Promotion/Fall Prevention: safety round/check completed  Taken 12/10/2023 2000 by Ashley Marinelli RN  Safety Promotion/Fall Prevention: safety round/check completed     Problem: Pain Acute  Goal: Acceptable Pain Control and Functional Ability  Outcome: Ongoing, Progressing  Intervention: Prevent or Manage Pain  Recent Flowsheet Documentation  Taken 12/11/2023 0406 by Ashley Marinelli RN  Medication Review/Management: medications reviewed  Taken 12/11/2023 0212 by Ashley Marinelli RN  Medication Review/Management: medications reviewed  Taken 12/11/2023 0000 by Ashley Marinelli RN  Medication Review/Management: medications reviewed  Taken 12/10/2023 2205 by Ashley Marinelli RN  Medication Review/Management: medications reviewed  Taken 12/10/2023 2200 by Ashley Marinelli RN  Medication Review/Management: medications reviewed  Taken 12/10/2023 2000 by Ashley Marinelli RN  Sensory Stimulation Regulation: care clustered  Medication Review/Management: medications reviewed  Intervention: Optimize Psychosocial Wellbeing  Recent Flowsheet Documentation  Taken 12/10/2023 2000 by Ashley Marinelli RN  Supportive Measures: active listening utilized  Diversional Activities: television   Goal Outcome Evaluation:  Plan of Care  Reviewed With: patient

## 2023-12-11 NOTE — PLAN OF CARE
Goal Outcome Evaluation:  Plan of Care Reviewed With: patient        Progress: improving  Outcome Evaluation: Pt seen for OT eval after admission 2/2 syncope and fall at home. Pt also with h/o CVA in August and ambulating with FWW at baseline and son assists with IADL tasks. Pt able  to complete bed mobility with supervision and STS transfers with SBA as well as toilet transfers with CGA for safety. Pt reporting he has glasses with prisms to help with diplopia ordered, but has not received them yet. Pt educated on outpt vision therapy options in future as well as home safety. OT will con't to follow acutely and anticipate d/c to home with initial 24 hr supervision and assist as  well as outpt PT.      Anticipated Discharge Disposition (OT): home with assist, home with 24/7 care, home with outpatient therapy services (outpt OT)

## 2023-12-11 NOTE — THERAPY EVALUATION
Patient Name: Flako Coreas  : 1965    MRN: [id number]                              Today's Date: 2023       Admit Date: 12/10/2023    Visit Dx:     ICD-10-CM ICD-9-CM   1. Syncope and collapse  R55 780.2   2. 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    Acute CVA (cerebrovascular accident)    HTN (hypertension)    Late effect of cerebrovascular accident (CVA)    Concussion    Fall    Head injury, closed, with concussion    Alcohol abuse    CHI (closed head injury), initial encounter     Past Medical History:   Diagnosis Date    ADHD (attention deficit hyperactivity disorder)     On going issue    Anxiety     Lepe's esophagus     Benign prostatic hyperplasia Surgery in 2021    Clotting disorder Intestinal    Depression     Diverticulitis     Diverticulosis     Duodenitis     Enteritis     Failure to thrive (child)     Family history of blood clots     GERD (gastroesophageal reflux disease)     Hyperlipidemia     Hypertension     Hypertensive emergency     Low testosterone     Microcytosis     Pancreatitis     Pneumonia     PONV (postoperative nausea and vomiting)     Seasonal affective disorder     Shoulder injury     Stroke     Unexplained weight loss      Past Surgical History:   Procedure Laterality Date    COLON SURGERY      COLONOSCOPY      COLONOSCOPY N/A 2022    Procedure: COLONOSCOPY INTO CECUM WITH HOT SNARE POLYPECTOMY;  Surgeon: Albert Gallo MD;  Location: [facility] ENDOSCOPY;  Service: Gastroenterology;  Laterality: N/A;  PRE- GI  BLEED  POST- DIVERTICULOSIS, POLYP    ENDOSCOPY N/A 12/10/2022    Procedure: ESOPHAGOGASTRODUODENOSCOPY;  Surgeon: Albert Gallo MD;  Location: [facility] ENDOSCOPY;  Service: Gastroenterology;  Laterality: N/A;  PRE- DARK STOOLS  POST- BARRETTS ESOPHAGUS    FRACTURE SURGERY  1980    for broken arm    FRACTURE SURGERY      for broken hand    INCISION AND DRAINAGE ABSCESS  2015    anal    PROSTATE SURGERY      SMALL INTESTINE SURGERY      TONSILLECTOMY        General Information       Row Name 12/11/23 1314          OT Time and Intention    Document Type evaluation  -CE     Mode of Treatment occupational therapy  -CE       Row Name 12/11/23 1314          General Information    Patient Profile Reviewed yes  -CE     Prior Level of Function independent:;ADL's  Kelsi w/ use of FWW and shower chair at home; son staying with him and assisting with IADL tasks, including transportation  -CE     Existing Precautions/Restrictions fall  -CE     Barriers to Rehab medically complex  -CE       Row Name 12/11/23 1314          Living Environment    People in Home alone  -CE       Row Name 12/11/23 1314          Home Main Entrance    Number of Stairs, Main Entrance --  pt stating he has 2 flights of stairs to enter  -CE     Stair Railings, Main Entrance railings safe and in good condition  -CE       Row Name 12/11/23 1314          Cognition    Orientation Status (Cognition) oriented x 4  -CE       Row Name 12/11/23 1314          Safety Issues, Functional Mobility    Safety Issues Affecting Function (Mobility) positioning of assistive device  -CE     Impairments Affecting Function (Mobility) balance;coordination;endurance/activity tolerance;motor control;pain;postural/trunk control;strength  -CE               User Key  (r) = Recorded By, (t) = Taken By, (c) = Cosigned By      Initials Name Provider Type    CE Charu Mccracken OT Occupational Therapist                     Mobility/ADL's       Row Name 12/11/23 1316          Bed  Mobility    Bed Mobility supine-sit;sit-supine  -CE     Supine-Sit [place] (Bed Mobility) standby assist  -CE     Sit-Supine [place] (Bed Mobility) standby assist  -CE       Row Name 12/11/23 1316          Transfers    Transfers sit-stand transfer;toilet transfer;stand-sit transfer  -CE       Row Name 12/11/23 1316          Sit-Stand Transfer    Sit-Stand [place] (Transfers) standby assist  -CE     Assistive Device (Sit-Stand Transfers) walker, front-wheeled  -CE       Row Name 12/11/23 1316          Stand-Sit Transfer    Stand-Sit [place] (Transfers) verbal cues;contact guard;1 person assist  -CE     Assistive Device (Stand-Sit Transfers) walker, front-wheeled  -CE       Row Name 12/11/23 1316          Toilet Transfer    Type (Toilet Transfer) sit-stand;stand-sit  -CE     [place] Level (Toilet Transfer) 1 person assist;contact guard  -     Assistive Device (Toilet Transfer) walker, front-wheeled;grab bars/safety frame  -CE       Row Name 12/11/23 1316          Functional Mobility    Functional Mobility- Comment Pt able to ambulate to/from bathroom with close SBA using FWW. Min cues for positioning of AD.  -CE       Row Name 12/11/23 1316          Activities of Daily Living    BADL Assessment/Intervention toileting  -       Row Name 12/11/23 1316          Toileting Assessment/Training    [place] Level (Toileting) standby assist  -CE               User Key  (r) = Recorded By, (t) = Taken By, (c) = Cosigned By      Initials Name Provider Type    CE Charu Mccracken OT Occupational Therapist                   Obj/Interventions       Row Name 12/11/23 1321          Sensory Assessment (Somatosensory)    Sensory Assessment (Somatosensory) sensation intact  -       Row Name 12/11/23 1321          Range of Motion Comprehensive    General Range of Motion no range of motion deficits identified  -       Row Name 12/11/23 1321          Strength Comprehensive (MMT)    General Manual Muscle  Testing (MMT) Assessment no strength deficits identified  -CE       Row Name 12/11/23 1321          Motor Skills    Motor Skills functional endurance  -CE     Functional Endurance fair  -CE       Row Name 12/11/23 1321          Balance    Balance Assessment standing dynamic balance  -CE     Dynamic Standing Balance contact guard  -CE     Position/Device Used, Standing Balance walker, front-wheeled  -CE               User Key  (r) = Recorded By, (t) = Taken By, (c) = Cosigned By      Initials Name Provider Type    CE Charu Mccracken OT Occupational Therapist                   Goals/Plan       Row Name 12/11/23 1327          Bed Mobility Goal 1 (OT)    Activity/Assistive Device (Bed Mobility Goal 1, OT) bed mobility activities, all  -CE     [place] Level/Cues Needed (Bed Mobility Goal 1, OT) modified independence  -CE     Time Frame (Bed Mobility Goal 1, OT) short term goal (STG);2 weeks  -CE       Row Name 12/11/23 1327          Transfer Goal 1 (OT)    Activity/Assistive Device (Transfer Goal 1, OT) transfers, all  -CE     [place] Level/Cues Needed (Transfer Goal 1, OT) modified independence  -CE     Time Frame (Transfer Goal 1, OT) short term goal (STG);2 weeks  -CE     Progress/Outcome (Transfer Goal 1, OT) new goal  -CE       Row Name 12/11/23 1327          Dressing Goal 1 (OT)    Activity/Device (Dressing Goal 1, OT) dressing skills, all  -CE     [place]/Cues Needed (Dressing Goal 1, OT) modified independence  -CE     Time Frame (Dressing Goal 1, OT) short term goal (STG);2 weeks  -CE     Progress/Outcome (Dressing Goal 1, OT) new goal  -CE       Row Name 12/11/23 1327          Toileting Goal 1 (OT)    Activity/Device (Toileting Goal 1, OT) toileting skills, all  -CE     [place] Level/Cues Needed (Toileting Goal 1, OT) modified independence  -CE     Time Frame (Toileting Goal 1, OT) short term goal (STG);2 weeks  -CE     Progress/Outcome (Toileting Goal 1, OT) new goal  -       Row Name  12/11/23 1327          Grooming Goal 1 (OT)    Activity/Device (Grooming Goal 1, OT) grooming skills, all  -CE     [place] (Grooming Goal 1, OT) modified independence  -CE     Time Frame (Grooming Goal 1, OT) short term goal (STG);2 weeks  -CE     Progress/Outcome (Grooming Goal 1, OT) new goal  -CE       Row Name 12/11/23 1328          Therapy Assessment/Plan (OT)    Planned Therapy Interventions (OT) activity tolerance training;functional balance retraining;transfer/mobility retraining;patient/caregiver education/training;neuromuscular control/coordination retraining  -CE               User Key  (r) = Recorded By, (t) = Taken By, (c) = Cosigned By      Initials Name Provider Type    CE Charu Mccracken OT Occupational Therapist                   Clinical Impression       Row Name 12/11/23 1322          Pain Assessment    Pre/Posttreatment Pain Comment pt with c/o back pain during mobility but did not formally rate  -CE     Pain Intervention(s) Rest  -CE       Row Name 12/11/23 1322          Plan of Care Review    Plan of Care Reviewed With patient  -CE     Progress improving  -CE     Outcome Evaluation Pt seen for OT eval after admission 2/2 syncope and fall at home. Pt also with h/o CVA in August and ambulating with FWW at baseline and son assists with IADL tasks. Pt able  to complete bed mobility with supervision and STS transfers with SBA as well as toilet transfers with CGA for safety. Pt reporting he has glasses with prisms to help with diplopia ordered, but has not received them yet. Pt educated on outpt vision therapy options in future as well as home safety. OT will con't to follow acutely and anticipate d/c to home with initial 24 hr supervision and assist as  well as outpt PT.  -CE       Row Name 12/11/23 1322          Therapy Assessment/Plan (OT)    Rehab Potential (OT) good, to achieve stated therapy goals  -CE     Criteria for Skilled Therapeutic Interventions Met (OT) yes  -CE     Therapy  Frequency (OT) 2 times/wk  -CE       Row Name 12/11/23 1322          Therapy Plan Review/Discharge Plan (OT)    Anticipated Discharge Disposition (OT) home with assist;home with 24/7 care;home with outpatient therapy services  outpt OT  -CE       Row Name 12/11/23 1322          Vital Signs    O2 Delivery Pre Treatment room air  -CE     O2 Delivery Intra Treatment room air  -CE     O2 Delivery Post Treatment room air  -CE     Pre Patient Position Supine  -CE     Intra Patient Position Standing  -CE     Post Patient Position Supine  -CE       Row Name 12/11/23 1322          Positioning and Restraints    Pre-Treatment Position in bed  -CE     Post Treatment Position bed  -CE     In Bed notified nsg;supine;encouraged to call for assist;call light within reach  -CE               User Key  (r) = Recorded By, (t) = Taken By, (c) = Cosigned By      Initials Name Provider Type    CE Charu Mccracken, SERA Occupational Therapist                   Outcome Measures       Row Name 12/11/23 1328          How much help from another is currently needed...    Putting on and taking off regular lower body clothing? 3  -CE     Bathing (including washing, rinsing, and drying) 3  -CE     Toileting (which includes using toilet bed pan or urinal) 3  -CE     Putting on and taking off regular upper body clothing 4  -CE     Taking care of personal grooming (such as brushing teeth) 4  -CE     Eating meals 4  -CE     AM-PAC 6 Clicks Score (OT) 21  -CE       Row Name 12/11/23 0957 12/11/23 0900       How much help from another person do you currently need...    Turning from your back to your side while in flat bed without using bedrails? 4  -ZB 4  -AH    Moving from lying on back to sitting on the side of a flat bed without bedrails? 4  -ZB 4  -AH    Moving to and from a bed to a chair (including a wheelchair)? 3  -ZB 3  -AH    Standing up from a chair using your arms (e.g., wheelchair, bedside chair)? 3  -ZB 3  -AH    Climbing 3-5 steps with a  railing? 2  -ZB 2  -AH    To walk in hospital room? 3  -ZB 3  -AH    AM-PAC 6 Clicks Score (PT) 19  -ZB 19  -AH    Highest Level of Mobility Goal 6 --> Walk 10 steps or more  -ZB 6 --> Walk 10 steps or more  -      Row Name 12/11/23 0957          Modified [place] Scale    Pre-Stroke Modified [place] Scale 4 - Moderately severe disability.  Unable to walk without assistance, and unable to attend to own bodily needs without assistance.  -ZB       Row Name 12/11/23 1328 12/11/23 0957       Functional Assessment    Outcome Measure Options AM-PAC 6 Clicks Daily Activity (OT)  -CE AM-PAC 6 Clicks Basic Mobility (PT);Modified [place]  -ZB              User Key  (r) = Recorded By, (t) = Taken By, (c) = Cosigned By      Initials Name Provider Type    Sherie Geiger, RN Registered Nurse    CE Charu Mccracken, OT Occupational Therapist    Liam Magana, PT Physical Therapist                    Occupational Therapy Education       Title: PT OT SLP Therapies (Done)       Topic: Occupational Therapy (Done)       Point: ADL training (Done)       Description:   Instruct learner(s) on proper safety adaptation and remediation techniques during self care or transfers.   Instruct in proper use of assistive devices.                  Learning Progress Summary             Patient Acceptance, E, VU by CE at 12/11/2023 1329                         Point: Home exercise program (Done)       Description:   Instruct learner(s) on appropriate technique for monitoring, assisting and/or progressing therapeutic exercises/activities.                  Learning Progress Summary             Patient Acceptance, E, VU by CE at 12/11/2023 1329                         Point: Precautions (Done)       Description:   Instruct learner(s) on prescribed precautions during self-care and functional transfers.                  Learning Progress Summary             Patient Acceptance, E, VU by CE at 12/11/2023 1329                         Point: Body  mechanics (Done)       Description:   Instruct learner(s) on proper positioning and spine alignment during self-care, functional mobility activities and/or exercises.                  Learning Progress Summary             Patient Acceptance, E, VU by CE at 12/11/2023 8113                                         User Key       Initials Effective Dates Name Provider Type Discipline    CE 10/17/22 -  Charu Mccracken OT Occupational Therapist OT                  OT Recommendation and Plan  Planned Therapy Interventions (OT): activity tolerance training, functional balance retraining, transfer/mobility retraining, patient/caregiver education/training, neuromuscular control/coordination retraining  Therapy Frequency (OT): 2 times/wk  Plan of Care Review  Plan of Care Reviewed With: patient  Progress: improving  Outcome Evaluation: Pt seen for OT eval after admission 2/2 syncope and fall at home. Pt also with h/o CVA in August and ambulating with FWW at baseline and son assists with IADL tasks. Pt able  to complete bed mobility with supervision and STS transfers with SBA as well as toilet transfers with CGA for safety. Pt reporting he has glasses with prisms to help with diplopia ordered, but has not received them yet. Pt educated on outpt vision therapy options in future as well as home safety. OT will con't to follow acutely and anticipate d/c to home with initial 24 hr supervision and assist as  well as outpt PT.     Time Calculation:   Evaluation Complexity (OT)  Review Occupational Profile/Medical/Therapy History Complexity: expanded/moderate complexity  Assessment, Occupational Performance/Identification of Deficit Complexity: 3-5 performance deficits  Clinical Decision Making Complexity (OT): detailed assessment/moderate complexity  Overall Complexity of Evaluation (OT): moderate complexity     Time Calculation- OT       Row Name 12/11/23 9469             Time Calculation- OT    OT Start Time 1110  -CE      OT  Stop Time 1125  -CE      OT Time Calculation (min) 15 min  -CE      OT Received On 12/11/23  -CE      OT - Next Appointment 12/14/23  -CE      OT Goal Re-Cert Due Date 12/25/23  -CE                User Key  (r) = Recorded By, (t) = Taken By, (c) = Cosigned By      Initials Name Provider Type    Charu Pride OT Occupational Therapist                  Therapy Charges for Today       Code Description Service Date Service Provider Modifiers Qty    [id number]  OT EVAL MOD COMPLEXITY 3 12/11/2023 Charu Mccracken OT GO 1                 Charu Mccracken OT  12/11/2023

## 2023-12-11 NOTE — PROGRESS NOTES
Discharge Planning Assessment  [facility]     Patient Name: Flako Coreas  MRN: [id number]  Today's Date: 12/11/2023    Admit Date: 12/10/2023    Plan: Home with assist from son and outpatient therapy   Discharge Needs Assessment       Row Name 12/11/23 1345       Living Environment    People in Home alone    Unique Family Situation Son Flako Coreas [phone] staying with pt to assist him post dc from [facility] acute rehab 11/17/23    Current Living Arrangements home    Primary Care Provided by self    Provides Primary Care For no one    Family Caregiver if Needed child(temo), adult    Quality of Family Relationships involved;helpful;supportive    Able to Return to Prior Arrangements yes       Transition Planning    Patient/Family Anticipates Transition to home with family    Patient/Family Anticipated Services at Transition rehabilitation services    Transportation Anticipated family or friend will provide       Discharge Needs Assessment    Current Outpatient/Agency/Support Group other (see comments)  outpatient therapy    Equipment Currently Used at Home walker, rolling;shower chair    Concerns to be Addressed discharge planning    Outpatient/Agency/Support Group Needs homecare agency;outpatient therapy    Discharge Facility/Level of Care Needs home with home health;outpatient therapy    Discharge Coordination/Progress OP vs HH                   Discharge Plan       Row Name 12/11/23 7742       Plan    Plan Home with assist from son and outpatient therapy    Patient/Family in Agreement with Plan yes    Plan Comments CCP following to assist with d/c planning. Pt resides alone but his son has been staying with him to assist following his dc from [facility] acute rehab on 11/17/23 with outpatient therapy arranged at [facility]. Pt has been to [facility] and [facility] for past sub-acute rehab and reports no HH history. CCP to follow for needs. Sophia Fields LCSW                  Continued Care and Services - Admitted Since  12/10/2023    Coordination has not been started for this encounter.       Selected Continued Care - Prior Encounters Includes continued care and service providers with selected services from prior encounters from 9/11/2023 to 12/11/2023      Discharged on 11/6/2023 Admission date: 10/26/2023 - Discharge disposition: Rehab Facility or Unit ([facility])      Destination       Service Provider Selected Services Address Phone Fax Patient Preferred    [facility] [id number] [phone] -- --                          Expected Discharge Date and Time       Expected Discharge Date Expected Discharge Time    Dec 13, 2023            Demographic Summary       Row Name 12/11/23 1345       General Information    Admission Type inpatient    Arrived From home    Referral Source admission list    Reason for Consult discharge planning    Preferred Language English                   Functional Status       Row Name 12/11/23 1345       Functional Status    Usual Activity Tolerance good    Current Activity Tolerance moderate       Functional Status, IADL    Medications assistive person    Meal Preparation assistive person    Housekeeping assistive person    Laundry assistive person    Shopping assistive person       Mental Status Summary    Recent Changes in Mental Status/Cognitive Functioning no changes                   Psychosocial    No documentation.                  Abuse/Neglect    No documentation.                  Legal    No documentation.                  Substance Abuse    No documentation.                  Patient Forms    No documentation.                     Yamilet Fields LCSW

## 2023-12-11 NOTE — THERAPY EVALUATION
Patient Name: Flako Coreas  : 1965    MRN: [id number]                              Today's Date: 2023       Admit Date: 12/10/2023    Visit Dx:     ICD-10-CM ICD-9-CM   1. Syncope and collapse  R55 780.2   2. 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    Acute CVA (cerebrovascular accident)    HTN (hypertension)    Late effect of cerebrovascular accident (CVA)    Concussion    Fall    Head injury, closed, with concussion    Alcohol abuse    CHI (closed head injury), initial encounter     Past Medical History:   Diagnosis Date    ADHD (attention deficit hyperactivity disorder)     On going issue    Anxiety     Lepe's esophagus     Benign prostatic hyperplasia Surgery in 2021    Clotting disorder Intestinal    Depression     Diverticulitis     Diverticulosis     Duodenitis     Enteritis     Failure to thrive (child)     Family history of blood clots     GERD (gastroesophageal reflux disease)     Hyperlipidemia     Hypertension     Hypertensive emergency     Low testosterone     Microcytosis     Pancreatitis     Pneumonia     PONV (postoperative nausea and vomiting)     Seasonal affective disorder     Shoulder injury     Stroke     Unexplained weight loss      Past Surgical History:   Procedure Laterality Date    COLON SURGERY      COLONOSCOPY      COLONOSCOPY N/A 2022    Procedure: COLONOSCOPY INTO CECUM WITH HOT SNARE POLYPECTOMY;  Surgeon: Albert Gallo MD;  Location: [facility] ENDOSCOPY;  Service: Gastroenterology;  Laterality: N/A;  PRE- GI  "BLEED  POST- DIVERTICULOSIS, POLYP    ENDOSCOPY N/A 12/10/2022    Procedure: ESOPHAGOGASTRODUODENOSCOPY;  Surgeon: Albert Gallo MD;  Location: [facility] ENDOSCOPY;  Service: Gastroenterology;  Laterality: N/A;  PRE- DARK STOOLS  POST- BARRETTS ESOPHAGUS    FRACTURE SURGERY  1980    for broken arm    FRACTURE SURGERY      for broken hand    INCISION AND DRAINAGE ABSCESS  2015    anal    PROSTATE SURGERY      SMALL INTESTINE SURGERY      TONSILLECTOMY        General Information       Row Name 12/11/23 0942          Physical Therapy Time and Intention    Document Type evaluation  -ZB     Mode of Treatment individual therapy;physical therapy  -ZB       Row Name 12/11/23 0942          General Information    Patient Profile Reviewed yes  -ZB     Prior Level of Function independent:;ADL's;all household mobility  states that his son has been staying with him since CVA to assist with laundry, cooking, etc. but is IND ADLs and mobility  -ZB     Existing Precautions/Restrictions fall  -ZB     Barriers to Rehab medically complex  -ZB       Row Name 12/11/23 0942          Living Environment    People in Home alone  -ZB       Row Name 12/11/23 0942          Stairs Within Home, Primary    Stairs, Within Home, Primary \"two flights\"  -ZB       Row Name 12/11/23 0942          Cognition    Orientation Status (Cognition) oriented x 4  -ZB       Row Name 12/11/23 0942          Safety Issues, Functional Mobility    Safety Issues Affecting Function (Mobility) positioning of assistive device  -ZB     Impairments Affecting Function (Mobility) balance;coordination;endurance/activity tolerance;motor control;pain;postural/trunk control;strength  -ZB     Comment, Safety Issues/Impairments (Mobility) gait belt and non skid socks donned  -ZB               User Key  (r) = Recorded By, (t) = Taken By, (c) = Cosigned By      Initials Name Provider Type    ZB Liam Hernandez, PT Physical Therapist                   Mobility       Row Name 12/11/23 " 0944          Bed Mobility    Bed Mobility supine-sit;sit-supine  -ZB     Supine-Sit [place] (Bed Mobility) standby assist  -ZB     Sit-Supine [place] (Bed Mobility) standby assist  -ZB     Assistive Device (Bed Mobility) head of bed elevated;bed rails  -ZB       Row Name 12/11/23 0944          Bed-Chair Transfer    Bed-Chair [place] (Transfers) not tested  -ZB       Row Name 12/11/23 0944          Sit-Stand Transfer    Sit-Stand [place] (Transfers) standby assist  -ZB     Assistive Device (Sit-Stand Transfers) walker, front-wheeled  -ZB       Row Name 12/11/23 0944          Gait/Stairs (Locomotion)    [place] Level (Gait) contact guard;verbal cues  -ZB     Assistive Device (Gait) walker, front-wheeled  -ZB     Distance in Feet (Gait) 25  -ZB     Deviations/Abnormal Patterns (Gait) bilateral deviations;ataxic;base of support, narrow;stride length decreased;gait speed decreased;scissoring  -ZB     Bilateral Gait Deviations forward flexed posture;heel strike decreased  -ZB     [place] Level (Stairs) not tested  -ZB     Comment, (Gait/Stairs) demonstrates bliateral scissoring during amublation with decreased heel strike, but is overall only moderately unsteady with no overt LOB; unsafe AD positioning with turns  -ZB               User Key  (r) = Recorded By, (t) = Taken By, (c) = Cosigned By      Initials Name Provider Type    ZB Liam Hernandez, PT Physical Therapist                   Obj/Interventions       Row Name 12/11/23 0946          Range of Motion Comprehensive    General Range of Motion no range of motion deficits identified  -ZB       Row Name 12/11/23 0946          Strength Comprehensive (MMT)    Comment, General Manual Muscle Testing (MMT) Assessment unable to formally assess LE MMT due to c/o low back pain during seated LE AROM; gross B LE MMT at least 3+/5 as patient able to support wt during ambulation w/o buckling  -ZB       Row Name 12/11/23 0946          Motor Skills     Motor Skills functional endurance  -ZB     Functional Endurance fair  -ZB       Row Name 12/11/23 0946          Balance    Balance Assessment sitting static balance;sitting dynamic balance;standing static balance;standing dynamic balance  -ZB     Static Sitting Balance supervision  -ZB     Dynamic Sitting Balance supervision  -ZB     Position, Sitting Balance unsupported;sitting edge of bed  -ZB     Static Standing Balance standby assist  -ZB     Dynamic Standing Balance contact guard  -ZB     Position/Device Used, Standing Balance supported;walker, front-wheeled  -ZB     Balance Interventions sitting;sit to stand;standing;supported;static;dynamic  -ZB       Row Name 12/11/23 0946          Sensory Assessment (Somatosensory)    Sensory Assessment (Somatosensory) LE sensation intact  -ZB               User Key  (r) = Recorded By, (t) = Taken By, (c) = Cosigned By      Initials Name Provider Type    ZB Liam Hernandez, PT Physical Therapist                   Goals/Plan       Row Name 12/11/23 0956          Bed Mobility Goal 1 (PT)    Activity/Assistive Device (Bed Mobility Goal 1, PT) bed mobility activities, all  -ZB     [place] Level/Cues Needed (Bed Mobility Goal 1, PT) independent  -ZB     Time Frame (Bed Mobility Goal 1, PT) short term goal (STG);2 weeks  -ZB       Row Name 12/11/23 0956          Transfer Goal 1 (PT)    Activity/Assistive Device (Transfer Goal 1, PT) transfers, all  -ZB     [place] Level/Cues Needed (Transfer Goal 1, PT) independent  -ZB     Time Frame (Transfer Goal 1, PT) short term goal (STG);2 weeks  -ZB       Row Name 12/11/23 0956          Gait Training Goal 1 (PT)    Activity/Assistive Device (Gait Training Goal 1, PT) gait (walking locomotion)  -ZB     [place] Level (Gait Training Goal 1, PT) modified independence  -ZB     Distance (Gait Training Goal 1, PT) 100  -ZB     Time Frame (Gait Training Goal 1, PT) short term goal (STG);2 weeks  -ZB       Row Name 12/11/23 0956        "   Stairs Goal 1 (PT)    Activity/Assistive Device (Stairs Goal 1, PT) stairs, all skills  -ZB     [place] Level/Cues Needed (Stairs Goal 1, PT) modified independence  -ZB     Number of Stairs (Stairs Goal 1, PT) 12  -ZB     Time Frame (Stairs Goal 1, PT) short term goal (STG);2 weeks  -ZB       Row Name 12/11/23 0956          Therapy Assessment/Plan (PT)    Planned Therapy Interventions (PT) balance training;bed mobility training;gait training;home exercise program;strengthening;stair training;patient/family education;neuromuscular re-education;transfer training  -ZB               User Key  (r) = Recorded By, (t) = Taken By, (c) = Cosigned By      Initials Name Provider Type    Liam Magana, CARIN Physical Therapist                   Clinical Impression       Row Name 12/11/23 0948          Pain    Pretreatment Pain Rating 7/10  -ZB     Posttreatment Pain Rating 7/10  -ZB     Pain Location lower  -ZB     Pain Location - back  -ZB     Pain Intervention(s) Ambulation/increased activity;Repositioned;Rest  -ZB       Row Name 12/11/23 0948          Plan of Care Review    Plan of Care Reviewed With patient  -ZB     Outcome Evaluation Pt is a 58 y//o male admitted to [facility] following a fall/syncopal episode at home. The patient has a PMHx that includes but is not limited to: anxiety, vertigo, CVA , ataxia, and alc abuse. He reports he had a recent stroke in August and has had balance deficits since. He does state that he has had multiple falls since the CVA, but that recently he has experienced episodes of \"blacking out\" when standing up from shower chair, couch, etc. that result in him waking up on the floor. He normally lives alone but his son has been staying with him to assist with household chores, cooking, etc. but he has maintained IND w/ ADLs and mobility using rwx. The pt lives in a multi-level home and reports no issues with stair negotiation as long as he takes his time. Today he completed bed mobility sba, " STS sba, and ambulated 25' cga + rwx. The patient is overall mildly unsteady with gait and demonstrates scissoring with decreased heel strike but no overt LOB. Pt primarily limited by back pain this date. The pt had just begun OP PT at this facility following a stay at rehab. Anticipated DC home with assist from son and continuing OP PT. PT will continue to monitor and progress as tolerated.  -ZB       Row Name 12/11/23 0948          Therapy Assessment/Plan (PT)    Rehab Potential (PT) good, to achieve stated therapy goals  -ZB     Criteria for Skilled Interventions Met (PT) yes  -ZB     Therapy Frequency (PT) 6 times/wk  -ZB       Row Name 12/11/23 0948          Vital Signs    O2 Delivery Pre Treatment room air  -ZB       Row Name 12/11/23 0948          Positioning and Restraints    Pre-Treatment Position in bed  -ZB     Post Treatment Position bed  -ZB     In Bed notified nsg;fowlers;call light within reach;encouraged to call for assist;exit alarm on  -ZB               User Key  (r) = Recorded By, (t) = Taken By, (c) = Cosigned By      Initials Name Provider Type    ZB Liam Hernandez, PT Physical Therapist                   Outcome Measures       Row Name 12/11/23 0957 12/11/23 0900       How much help from another person do you currently need...    Turning from your back to your side while in flat bed without using bedrails? 4  -ZB 4  -AH    Moving from lying on back to sitting on the side of a flat bed without bedrails? 4  -ZB 4  -AH    Moving to and from a bed to a chair (including a wheelchair)? 3  -ZB 3  -AH    Standing up from a chair using your arms (e.g., wheelchair, bedside chair)? 3  -ZB 3  -AH    Climbing 3-5 steps with a railing? 2  -ZB 2  -AH    To walk in hospital room? 3  -ZB 3  -AH    AM-PAC 6 Clicks Score (PT) 19  -ZB 19  -AH    Highest Level of Mobility Goal 6 --> Walk 10 steps or more  -ZB 6 --> Walk 10 steps or more  -AH      Row Name 12/11/23 0957          Modified Egan Scale    Pre-Stroke  Modified Jeff Davis Scale 4 - Moderately severe disability.  Unable to walk without assistance, and unable to attend to own bodily needs without assistance.  -       Row Name 12/11/23 1328 12/11/23 0957       Functional Assessment    Outcome Measure Options AM-PAC 6 Clicks Daily Activity (OT)  -CE AM-PAC 6 Clicks Basic Mobility (PT);Modified Jeff Davis  -ZB              User Key  (r) = Recorded By, (t) = Taken By, (c) = Cosigned By      Initials Name Provider Type     Sherie Seals, RN Registered Nurse    CE Charu Mccracken, OT Occupational Therapist    Liam Magana, PT Physical Therapist                                 Physical Therapy Education       Title: PT OT SLP Therapies (Done)       Topic: Physical Therapy (Done)       Point: Mobility training (Done)       Learning Progress Summary             Patient Acceptance, E,D, VU,DU by GRISELDA at 12/11/2023 0958                         Point: Home exercise program (Done)       Learning Progress Summary             Patient Acceptance, E,D, VU,DU by GRISELDA at 12/11/2023 0958                         Point: Body mechanics (Done)       Learning Progress Summary             Patient Acceptance, E,D, VU,DU by GRISELDA at 12/11/2023 0958                         Point: Precautions (Done)       Learning Progress Summary             Patient Acceptance, E,D, VU,DU by GRISELDA at 12/11/2023 0958                                         User Key       Initials Effective Dates Name Provider Type Discipline    SHMUEL 08/30/23 -  Liam Hernandez, CARIN Physical Therapist PT                  PT Recommendation and Plan  Planned Therapy Interventions (PT): balance training, bed mobility training, gait training, home exercise program, strengthening, stair training, patient/family education, neuromuscular re-education, transfer training  Plan of Care Reviewed With: patient  Outcome Evaluation: Pt is a 58 y//o male admitted to [facility] following a fall/syncopal episode at home. The patient has a PMHx that includes but is  "not limited to: anxiety, vertigo, CVA , ataxia, and alc abuse. He reports he had a recent stroke in August and has had balance deficits since. He does state that he has had multiple falls since the CVA, but that recently he has experienced episodes of \"blacking out\" when standing up from shower chair, couch, etc. that result in him waking up on the floor. He normally lives alone but his son has been staying with him to assist with household chores, cooking, etc. but he has maintained IND w/ ADLs and mobility using rwx. The pt lives in a multi-level home and reports no issues with stair negotiation as long as he takes his time. Today he completed bed mobility sba, STS sba, and ambulated 25' cga + rwx. The patient is overall mildly unsteady with gait and demonstrates scissoring with decreased heel strike but no overt LOB. Pt primarily limited by back pain this date. The pt had just begun OP PT at this facility following a stay at rehab. Anticipated DC home with assist from son and continuing OP PT. PT will continue to monitor and progress as tolerated.     Time Calculation:         PT Charges       Row Name 12/11/23 0959             Time Calculation    Start Time 0815  -ZB      Stop Time 0827  -ZB      Time Calculation (min) 12 min  -ZB      PT Received On 12/11/23  -ZB      PT - Next Appointment 12/12/23  -ZB      PT Goal Re-Cert Due Date 12/25/23  -ZB         Time Calculation- PT    Total Timed Code Minutes- PT 8 minute(s)  -ZB         Timed Charges    42883 - PT Therapeutic Activity Minutes 8  -ZB         Total Minutes    Timed Charges Total Minutes 8  -ZB       Total Minutes 8  -ZB                User Key  (r) = Recorded By, (t) = Taken By, (c) = Cosigned By      Initials Name Provider Type    ZB Liam Hernandez, PT Physical Therapist                  Therapy Charges for Today       Code Description Service Date Service Provider Modifiers Qty    39534086767  PT THERAPEUTIC ACT EA 15 MIN 12/11/2023 Liam Hernandez, " PT GP 1    48771076881 HC PT EVAL MOD COMPLEXITY 3 12/11/2023 Liam Hernandez, PT GP 1            PT G-Codes  Outcome Measure Options: AM-PAC 6 Clicks Daily Activity (OT)  AM-PAC 6 Clicks Score (PT): 19  AM-PAC 6 Clicks Score (OT): 21  PT Discharge Summary  Anticipated Discharge Disposition (PT): home with outpatient therapy services, home with assist    Cj Hernandez, PT  12/11/2023

## 2023-12-11 NOTE — PLAN OF CARE
"Goal Outcome Evaluation:  Plan of Care Reviewed With: patient           Outcome Evaluation: Pt is a 58 y//o male admitted to [facility] following a fall/syncopal episode at home. The patient has a PMHx that includes but is not limited to: anxiety, vertigo, CVA , ataxia, and alc abuse. He reports he had a recent stroke in August and has had balance deficits since. He does state that he has had multiple falls since the CVA, but that recently he has experienced episodes of \"blacking out\" when standing up from shower chair, couch, etc. that result in him waking up on the floor. He normally lives alone but his son has been staying with him to assist with household chores, cooking, etc. but he has maintained IND w/ ADLs and mobility using rwx. The pt lives in a multi-level home and reports no issues with stair negotiation as long as he takes his time. Today he completed bed mobility sba, STS sba, and ambulated 25' cga + rwx. The patient is overall mildly unsteady with gait and demonstrates scissoring with decreased heel strike but no overt LOB. Pt primarily limited by back pain this date. The pt had just begun OP PT at this facility following a stay at rehab. Anticipated DC home with assist from son and continuing OP PT. PT will continue to monitor and progress as tolerated.      Anticipated Discharge Disposition (PT): home with outpatient therapy services, home with assist  "

## 2023-12-11 NOTE — PROGRESS NOTES
Dedicated to Hospital Care    [phone]   LOS: 1 day     Name: Flako Coreas  Age/Sex: 58 y.o. male  :  1965        PCP: Akash Rodriguez Jr.,   Chief Complaint   Patient presents with    Syncope    Fall      Subjective   Rested well overnight denies dizziness right now but he does admit that he has some postural dizziness and changes.  However he is not entirely sure of all of his symptoms are related to changing position.  General: No Fever or Chills, Cardiac: No Chest Pain or Palpitations, Resp: No Cough or SOA, GI: No Nausea, Vomiting, or Diarrhea, and Other: No bleeding    amLODIPine, 5 mg, Oral, Daily  aspirin, 81 mg, Oral, Daily  atorvastatin, 80 mg, Oral, Nightly  carvedilol, 6.25 mg, Oral, BID With Meals  folic acid, 1 mg, Oral, Daily  hydrALAZINE, 100 mg, Oral, Q8H  hydroCHLOROthiazide Oral, 12.5 mg, Oral, Daily  lidocaine 1% - EPINEPHrine 1:[id number], 10 mL, Injection, Once  lisinopril, 40 mg, Oral, Q24H  melatonin, 5 mg, Oral, Nightly  multivitamin, 1 tablet, Oral, Daily  pantoprazole, 40 mg, Oral, Q AM  senna-docusate sodium, 2 tablet, Oral, BID  sodium chloride, 10 mL, Intravenous, Q12H  spironolactone, 25 mg, Oral, Daily  thiamine, 100 mg, Oral, Daily  vilazodone, 40 mg, Oral, Daily           Objective   Vital Signs  Temp:  [97.7 °F (36.5 °C)-98.2 °F (36.8 °C)] 97.7 °F (36.5 °C)  Heart Rate:  [] 102  Resp:  [16-18] 18  BP: (151-196)/() 196/116  Body mass index is 31.04 kg/m².    Intake/Output Summary (Last 24 hours) at 2023 1318  Last data filed at 2023 0900  Gross per 24 hour   Intake --   Output 350 ml   Net -350 ml       Physical Exam  Vitals and nursing note reviewed.   Constitutional:       General: He is not in acute distress.     Appearance: He is ill-appearing.   HENT:      Head:      Comments: Abrasion over right eye  Cardiovascular:      Rate and Rhythm: Normal rate and regular rhythm.   Pulmonary:      Effort: No respiratory distress.      Breath  sounds: Normal breath sounds.   Abdominal:      General: Bowel sounds are normal.      Palpations: Abdomen is soft.   Neurological:      General: No focal deficit present.      Mental Status: He is alert. Mental status is at baseline.           Results Review:       I reviewed the patient's new clinical results.  Results from last 7 days   Lab Units 12/10/23  1130   WBC 10*3/mm3 6.72   HEMOGLOBIN g/dL 12.4*   PLATELETS 10*3/mm3 283     Results from last 7 days   Lab Units 12/11/23  0452 12/10/23  1130   SODIUM mmol/L 142 142   POTASSIUM mmol/L 4.6 3.6   CHLORIDE mmol/L 106 106   CO2 mmol/L 22.7 22.8   BUN mg/dL 11 12   CREATININE mg/dL 0.98 0.96   CALCIUM mg/dL 9.5 9.1   Estimated Creatinine Clearance: 111.1 mL/min (by C-G formula based on SCr of 0.98 mg/dL).      Assessment & Plan   Active Hospital Problems    Diagnosis  POA    **CHI (closed head injury), initial encounter [S09.90XA]  Yes    Alcohol abuse [F10.10]  Yes    Fall [W19.XXXA]  Yes    Concussion [S06.0XAA]  Yes    Late effect of cerebrovascular accident (CVA) [I69.30]  Not Applicable    HTN (hypertension) [I10]  Yes    CVA (cerebral vascular accident) [I63.9]  Yes    Ataxia [R27.0]  Yes    Mixed anxiety depressive disorder [F41.8]  Yes      Resolved Hospital Problems   No resolved problems to display.       PLAN  This is a 58-year-old gentleman with a history of chronic alcohol use hypertension and previous stroke who presents to the hospital after a fall with near syncope  -He may have some postconcussive encephalopathy causing the dizziness and postconcussion syndrome even though his orthostatics were negative this morning he is not on his blood pressure medication and I do think that he would be at high risk for orthostasis given his combination of Norvasc hydralazine and diuretic therapy.  -He has a longstanding history of high blood pressure and could probably benefit from a secondary workup with renal artery ultrasound and screening for obstructive  sleep apnea.  Discussed this with the patient and will help facilitate in the outpatient setting  -We restarted his home blood pressure medications and plan to who reevaluate orthostatics tomorrow morning after 24 hours of blood pressure meds  -Neurology and cardiology been consulted for assistance in workup and management of the syncopal event.  -Physical therapy and Occupational Therapy to evaluate  -Mechanical DVT prophylaxis  -Full code    Disposition  Expected discharge date/ time has not been documented.       Alden Jimenez MD  [facility]  12/11/23  13:18 EST

## 2023-12-12 PROCEDURE — 97530 THERAPEUTIC ACTIVITIES: CPT

## 2023-12-12 PROCEDURE — G0378 HOSPITAL OBSERVATION PER HR: HCPCS

## 2023-12-12 PROCEDURE — 99232 SBSQ HOSP IP/OBS MODERATE 35: CPT | Performed by: INTERNAL MEDICINE

## 2023-12-12 RX ADMIN — ASPIRIN 81 MG: 81 TABLET, COATED ORAL at 08:52

## 2023-12-12 RX ADMIN — Medication 100 MG: at 08:53

## 2023-12-12 RX ADMIN — ATORVASTATIN CALCIUM 80 MG: 80 TABLET, FILM COATED ORAL at 20:08

## 2023-12-12 RX ADMIN — DOCUSATE SODIUM 50MG AND SENNOSIDES 8.6MG 2 TABLET: 8.6; 5 TABLET, FILM COATED ORAL at 20:08

## 2023-12-12 RX ADMIN — AMLODIPINE BESYLATE 5 MG: 5 TABLET ORAL at 08:52

## 2023-12-12 RX ADMIN — FOLIC ACID 1 MG: 1 TABLET ORAL at 08:53

## 2023-12-12 RX ADMIN — CARVEDILOL 6.25 MG: 6.25 TABLET, FILM COATED ORAL at 18:48

## 2023-12-12 RX ADMIN — CARVEDILOL 6.25 MG: 6.25 TABLET, FILM COATED ORAL at 08:50

## 2023-12-12 RX ADMIN — Medication 10 ML: at 20:08

## 2023-12-12 RX ADMIN — SPIRONOLACTONE 25 MG: 25 TABLET, FILM COATED ORAL at 08:49

## 2023-12-12 RX ADMIN — VILAZODONE HYDROCHLORIDE 40 MG: 40 TABLET, FILM COATED ORAL at 08:52

## 2023-12-12 RX ADMIN — PANTOPRAZOLE SODIUM 40 MG: 40 TABLET, DELAYED RELEASE ORAL at 04:54

## 2023-12-12 RX ADMIN — HYDRALAZINE HYDROCHLORIDE 100 MG: 50 TABLET, FILM COATED ORAL at 14:46

## 2023-12-12 RX ADMIN — HYDROXYZINE HYDROCHLORIDE 25 MG: 25 TABLET ORAL at 12:02

## 2023-12-12 RX ADMIN — Medication 5 MG: at 20:08

## 2023-12-12 RX ADMIN — LISINOPRIL 40 MG: 40 TABLET ORAL at 08:53

## 2023-12-12 RX ADMIN — HYDRALAZINE HYDROCHLORIDE 100 MG: 50 TABLET, FILM COATED ORAL at 06:33

## 2023-12-12 RX ADMIN — HYDRALAZINE HYDROCHLORIDE 100 MG: 50 TABLET, FILM COATED ORAL at 20:07

## 2023-12-12 RX ADMIN — HYDROXYZINE HYDROCHLORIDE 25 MG: 25 TABLET ORAL at 19:27

## 2023-12-12 RX ADMIN — HYDROCHLOROTHIAZIDE 12.5 MG: 12.5 TABLET ORAL at 08:52

## 2023-12-12 RX ADMIN — HYDROXYZINE HYDROCHLORIDE 25 MG: 25 TABLET ORAL at 04:54

## 2023-12-12 RX ADMIN — Medication 1 TABLET: at 08:52

## 2023-12-12 NOTE — PROGRESS NOTES
LOS: 2 days   Patient Care Team:  Akash Rodriguez Jr., DO as PCP - General (Sports Medicine)    Chief Complaint: Follow-up near syncope, severe hypertension, history of CVA.    Interval History: Still with some unsteady feelings when he is up and around, but not lightheaded.  No chest pain or shortness of breath.    Vital Signs:  Temp:  [98.1 °F (36.7 °C)-98.2 °F (36.8 °C)] 98.1 °F (36.7 °C)  Heart Rate:  [59-70] 70  Resp:  [16-18] 18  BP: (130-173)/() 153/117    Intake/Output Summary (Last 24 hours) at 12/12/2023 1709  Last data filed at 12/12/2023 1706  Gross per 24 hour   Intake --   Output 450 ml   Net -450 ml       Physical Exam:   General Appearance:    No acute distress, alert and oriented x4   Lungs:     Clear to auscultation bilaterally     Heart:    Regular rhythm and normal rate.  No murmurs, gallops, or  rubs.   Abdomen:     Soft, nontender, nondistended.    Extremities:   No clubbing, cyanosis, or edema.     Results Review:    Results from last 7 days   Lab Units 12/11/23  0452   SODIUM mmol/L 142   POTASSIUM mmol/L 4.6   CHLORIDE mmol/L 106   CO2 mmol/L 22.7   BUN mg/dL 11   CREATININE mg/dL 0.98   GLUCOSE mg/dL 112*   CALCIUM mg/dL 9.5         Results from last 7 days   Lab Units 12/10/23  1130   WBC 10*3/mm3 6.72   HEMOGLOBIN g/dL 12.4*   HEMATOCRIT % 39.1   PLATELETS 10*3/mm3 283     Results from last 7 days   Lab Units 12/10/23  1130   INR  0.97   APTT seconds 29.4                   I reviewed the patient's new clinical results.        Assessment:  1.  Near syncope  2.  Severe hypertension, difficult to control  3.  History of left cerebellar CVA in August 2023  4.  Hyperlipidemia  5.  GERD with a history of Lepe's esophagus    Plan:  -Spoke with Dr. Jimenez earlier today.  The patient still feels unsteady, and some of this may be related to his previous cerebellar stroke.  His orthostatics were negative this morning, although there are plans to repeat this after his hydralazine  this evening.    -He very well could have positional symptoms given that he is on multiple blood pressure medications and would be more predisposed to decreases in his blood pressure.    -He has had a fairly thorough secondary hypertension workup, including a renal artery ultrasound.  He is now getting a 24-hour urine for catecholamines currently.    -He is going to follow-up for his obstructive sleep apnea appointment as an outpatient.  He is aware of the importance of this as this is the #1 cause for refractory hypertension.    -Despite multiple medications, his blood pressure continues to be somewhat difficult to control.    Carlos Foster MD  12/12/23  17:09 EST

## 2023-12-12 NOTE — THERAPY TREATMENT NOTE
Patient Name: Flako Coreas  : 1965    MRN: [id number]                              Today's Date: 2023       Admit Date: 12/10/2023    Visit Dx:     ICD-10-CM ICD-9-CM   1. Syncope and collapse  R55 780.2   2. 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    Acute CVA (cerebrovascular accident)    HTN (hypertension)    Late effect of cerebrovascular accident (CVA)    Concussion    Fall    Head injury, closed, with concussion    Alcohol abuse    CHI (closed head injury), initial encounter     Past Medical History:   Diagnosis Date    ADHD (attention deficit hyperactivity disorder)     On going issue    Anxiety     Lepe's esophagus     Benign prostatic hyperplasia Surgery in 2021    Clotting disorder Intestinal    Depression     Diverticulitis     Diverticulosis     Duodenitis     Enteritis     Failure to thrive (child)     Family history of blood clots     GERD (gastroesophageal reflux disease)     Hyperlipidemia     Hypertension     Hypertensive emergency     Low testosterone     Microcytosis     Pancreatitis     Pneumonia     PONV (postoperative nausea and vomiting)     Seasonal affective disorder     Shoulder injury     Stroke     Unexplained weight loss      Past Surgical History:   Procedure Laterality Date    COLON SURGERY      COLONOSCOPY      COLONOSCOPY N/A 2022    Procedure: COLONOSCOPY INTO CECUM WITH HOT SNARE POLYPECTOMY;  Surgeon: Albert Gallo MD;  Location: [facility] ENDOSCOPY;  Service: Gastroenterology;  Laterality: N/A;  PRE- GI  BLEED  POST- DIVERTICULOSIS, POLYP    ENDOSCOPY N/A 12/10/2022    Procedure: ESOPHAGOGASTRODUODENOSCOPY;  Surgeon: Albert Gallo MD;  Location: [facility] ENDOSCOPY;  Service: Gastroenterology;  Laterality: N/A;  PRE- DARK STOOLS  POST- BARRETTS ESOPHAGUS    FRACTURE SURGERY  1980    for broken arm    FRACTURE SURGERY      for broken hand    INCISION AND DRAINAGE ABSCESS  2015    anal    PROSTATE SURGERY      SMALL INTESTINE SURGERY      TONSILLECTOMY        General Information       Row Name 12/12/23 0852          Physical Therapy Time and Intention    Document Type therapy note (daily note)  -ZB     Mode of Treatment individual therapy;physical therapy  -ZB       Row Name 12/12/23 0852          General Information    Patient Profile Reviewed yes  -ZB     Existing Precautions/Restrictions fall  -ZB       Row Name 12/12/23 0852          Cognition    Orientation Status (Cognition) oriented x 4  -ZB       Row Name 12/12/23 0852          Safety Issues, Functional Mobility    Impairments Affecting Function (Mobility) balance;coordination;endurance/activity tolerance;motor control;pain;postural/trunk control;strength  -ZB     Comment, Safety Issues/Impairments (Mobility) gait belt and non skid socks donned  -ZB               User Key  (r) = Recorded By, (t) = Taken By, (c) = Cosigned By      Initials Name Provider Type    ZB Liam Hernandez, CARIN Physical Therapist                   Mobility       Row Name 12/12/23 0852          Bed Mobility    Bed Mobility supine-sit;sit-supine  -ZB     Supine-Sit [place] (Bed Mobility) supervision  -ZB     Sit-Supine [place] (Bed Mobility) supervision  -ZB     Assistive Device (Bed Mobility) head of bed elevated;bed rails  -ZB       Row Name 12/12/23 0852          Bed-Chair Transfer    Bed-Chair [place] (Transfers) not tested  -ZB       Row Name 12/12/23 0852          Sit-Stand Transfer    Sit-Stand [place] (Transfers) standby assist  -ZB     Assistive Device (Sit-Stand  Transfers) walker, front-wheeled  -ZB       Row Name 12/12/23 0852          Gait/Stairs (Locomotion)    [place] Level (Gait) contact guard;verbal cues  -ZB     Assistive Device (Gait) walker, front-wheeled  -ZB     Distance in Feet (Gait) 200  -ZB     Deviations/Abnormal Patterns (Gait) ataxic;base of support, narrow;stride length decreased;gait speed decreased  -ZB     Bilateral Gait Deviations forward flexed posture;heel strike decreased  -ZB     [place] Level (Stairs) not tested  -ZB     Comment, (Gait/Stairs) ambulates steady overall this AM with no scissoring noted and improved heel strike bilaterally  -ZB               User Key  (r) = Recorded By, (t) = Taken By, (c) = Cosigned By      Initials Name Provider Type    Liam Magana PT Physical Therapist                   Obj/Interventions       Row Name 12/12/23 0854          Motor Skills    Functional Endurance improving  -ZB       Row Name 12/12/23 0854          Balance    Balance Assessment sitting static balance;sitting dynamic balance;standing static balance;standing dynamic balance  -ZB     Static Sitting Balance supervision  -ZB     Dynamic Sitting Balance supervision  -ZB     Position, Sitting Balance unsupported;sitting edge of bed  -ZB     Static Standing Balance standby assist  -ZB     Dynamic Standing Balance contact guard  -ZB     Position/Device Used, Standing Balance supported;walker, front-wheeled  -ZB     Balance Interventions sitting;sit to stand;supported;standing;dynamic;static  -ZB               User Key  (r) = Recorded By, (t) = Taken By, (c) = Cosigned By      Initials Name Provider Type    Liam Magana PT Physical Therapist                   Goals/Plan    No documentation.                  Clinical Impression       Row Name 12/12/23 0946          Pain    Pretreatment Pain Rating 7/10  -ZB     Posttreatment Pain Rating 7/10  -ZB     Pain Location lower  -ZB     Pain Location - back  -ZB     Pain Intervention(s)  Ambulation/increased activity;Repositioned;Rest  -ZB       Row Name 12/12/23 0946          Plan of Care Review    Plan of Care Reviewed With patient  -ZB     Progress improving  -ZB     Outcome Evaluation Pt in bed and agreeable to therapy this AM. The patient continues to report significant level of back pain. He completes bed mobility spv, STS sba, and ambulated 200' cga + rwx with no overt LOB. The patient demonstrates improved gait mechanics with no scissoring, increased heel strike, and improvements in stride length with minimal cueing. The patient requested to return to bed to end session. Back pain seems to be a primary limiting factor at this juncture. PT will continue to monitor and progress as tolerated.  -ZB       Row Name 12/12/23 0946          Vital Signs    O2 Delivery Pre Treatment room air  -ZB       Row Name 12/12/23 0946          Positioning and Restraints    Pre-Treatment Position in bed  -ZB     Post Treatment Position bed  -ZB     In Bed notified nsg;fowlers;call light within reach;encouraged to call for assist  -ZB               User Key  (r) = Recorded By, (t) = Taken By, (c) = Cosigned By      Initials Name Provider Type    ZB Liam Hernandez, PT Physical Therapist                   Outcome Measures       Row Name 12/12/23 0950 12/11/23 2208       How much help from another person do you currently need...    Turning from your back to your side while in flat bed without using bedrails? 4  -ZB 4  -MS    Moving from lying on back to sitting on the side of a flat bed without bedrails? 4  -ZB 4  -MS    Moving to and from a bed to a chair (including a wheelchair)? 3  -ZB 3  -MS    Standing up from a chair using your arms (e.g., wheelchair, bedside chair)? 3  -ZB 3  -MS    Climbing 3-5 steps with a railing? 3  -ZB 2  -MS    To walk in hospital room? 3  -ZB 3  -MS    AM-PAC 6 Clicks Score (PT) 20  -ZB 19  -MS    Highest Level of Mobility Goal 6 --> Walk 10 steps or more  -ZB 6 --> Walk 10 steps or more   -MS      Row Name 12/12/23 0950          Functional Assessment    Outcome Measure Options AM-PAC 6 Clicks Basic Mobility (PT)  -ZB               User Key  (r) = Recorded By, (t) = Taken By, (c) = Cosigned By      Initials Name Provider Type    Ashley Reaves, RN Registered Nurse    Liam Magana, CARIN Physical Therapist                                 Physical Therapy Education       Title: PT OT SLP Therapies (Done)       Topic: Physical Therapy (Done)       Point: Mobility training (Done)       Learning Progress Summary             Patient Acceptance, E,D, VU,DU by GRISELDA at 12/11/2023 0958                         Point: Home exercise program (Done)       Learning Progress Summary             Patient Acceptance, E,D, VU,DU by GRISELDA at 12/11/2023 0958                         Point: Body mechanics (Done)       Learning Progress Summary             Patient Acceptance, E,D, VU,DU by GRISELDA at 12/11/2023 0958                         Point: Precautions (Done)       Learning Progress Summary             Patient Acceptance, E,D, VU,DU by GRISELDA at 12/11/2023 0958                                         User Key       Initials Effective Dates Name Provider Type Discipline    GRISELDA 08/30/23 -  Liam Hernandez, CARIN Physical Therapist PT                  PT Recommendation and Plan  Planned Therapy Interventions (PT): balance training, bed mobility training, gait training, home exercise program, strengthening, stair training, patient/family education, neuromuscular re-education, transfer training  Plan of Care Reviewed With: patient  Progress: improving  Outcome Evaluation: Pt in bed and agreeable to therapy this AM. The patient continues to report significant level of back pain. He completes bed mobility spv, STS sba, and ambulated 200' cga + rwx with no overt LOB. The patient demonstrates improved gait mechanics with no scissoring, increased heel strike, and improvements in stride length with minimal cueing. The patient requested to return  to bed to end session. Back pain seems to be a primary limiting factor at this juncture. PT will continue to monitor and progress as tolerated.     Time Calculation:         PT Charges       Row Name 12/12/23 0951             Time Calculation    Start Time 0825  -ZB      Stop Time 0834  -ZB      Time Calculation (min) 9 min  -ZB      PT Received On 12/12/23  -ZB      PT - Next Appointment 12/13/23  -ZB         Time Calculation- PT    Total Timed Code Minutes- PT 9 minute(s)  -ZB         Timed Charges    92300 - PT Therapeutic Activity Minutes 9  -ZB         Total Minutes    Timed Charges Total Minutes 9  -ZB       Total Minutes 9  -ZB                User Key  (r) = Recorded By, (t) = Taken By, (c) = Cosigned By      Initials Name Provider Type    Liam Magana, CARIN Physical Therapist                  Therapy Charges for Today       Code Description Service Date Service Provider Modifiers Qty    44277530512  PT THERAPEUTIC ACT EA 15 MIN 12/11/2023 Liam Hernandez, PT GP 1    10061951838 HC PT EVAL MOD COMPLEXITY 3 12/11/2023 Liam Hernandez, PT GP 1    77165741904 HC PT THERAPEUTIC ACT EA 15 MIN 12/12/2023 Liam Hernandez, PT GP 1            PT G-Codes  Outcome Measure Options: AM-PAC 6 Clicks Basic Mobility (PT)  AM-PAC 6 Clicks Score (PT): 20  AM-PAC 6 Clicks Score (OT): 21  PT Discharge Summary  Anticipated Discharge Disposition (PT): home with outpatient therapy services, home with assist    Cj Hernandez PT  12/12/2023

## 2023-12-12 NOTE — PLAN OF CARE
Goal Outcome Evaluation:  Plan of Care Reviewed With: patient        Progress: improving  Outcome Evaluation: Pt in bed and agreeable to therapy this AM. The patient continues to report significant level of back pain. He completes bed mobility spv, STS sba, and ambulated 200' cga + rwx with no overt LOB. The patient demonstrates improved gait mechanics with no scissoring, increased heel strike, and improvements in stride length with minimal cueing. The patient requested to return to bed to end session. Back pain seems to be a primary limiting factor at this juncture. PT will continue to monitor and progress as tolerated.      Anticipated Discharge Disposition (PT): home with outpatient therapy services, home with assist

## 2023-12-12 NOTE — PROGRESS NOTES
[facility]    [phone]   LOS: 2 days     Name: Flako Coreas  Age/Sex: 58 y.o. male  :  1965        PCP: Akash Rodriguez Jr.,   Chief Complaint   Patient presents with    Syncope    Fall      Subjective   Remains unsteady on his feet when he is up moving around.  He says it is just unsteadiness and not really dizziness.  There is some postural component to this but orthostatics were negative this morning.  General: No Fever or Chills, Cardiac: No Chest Pain or Palpitations, Resp: No Cough or SOA, GI: No Nausea, Vomiting, or Diarrhea, and Other: No bleeding    amLODIPine, 5 mg, Oral, Daily  aspirin, 81 mg, Oral, Daily  atorvastatin, 80 mg, Oral, Nightly  carvedilol, 6.25 mg, Oral, BID With Meals  folic acid, 1 mg, Oral, Daily  hydrALAZINE, 100 mg, Oral, Q8H  hydroCHLOROthiazide Oral, 12.5 mg, Oral, Daily  lidocaine 1% - EPINEPHrine 1:[id number], 10 mL, Injection, Once  lisinopril, 40 mg, Oral, Q24H  melatonin, 5 mg, Oral, Nightly  multivitamin, 1 tablet, Oral, Daily  pantoprazole, 40 mg, Oral, Q AM  senna-docusate sodium, 2 tablet, Oral, BID  sodium chloride, 10 mL, Intravenous, Q12H  spironolactone, 25 mg, Oral, Daily  thiamine, 100 mg, Oral, Daily  vilazodone, 40 mg, Oral, Daily           Objective   Vital Signs  Temp:  [98.1 °F (36.7 °C)-98.2 °F (36.8 °C)] 98.1 °F (36.7 °C)  Heart Rate:  [59-70] 70  Resp:  [16-18] 18  BP: (130-173)/(76-95) 135/89  Body mass index is 31.04 kg/m².    Intake/Output Summary (Last 24 hours) at 2023 1325  Last data filed at 2023 1254  Gross per 24 hour   Intake --   Output 200 ml   Net -200 ml       Physical Exam  Vitals and nursing note reviewed.   Constitutional:       General: He is not in acute distress.     Appearance: He is ill-appearing.   HENT:      Head:      Comments: Abrasion over right eye  Cardiovascular:      Rate and Rhythm: Normal rate and regular rhythm.   Pulmonary:      Effort: No respiratory distress.      Breath  sounds: Normal breath sounds.   Abdominal:      General: Bowel sounds are normal.      Palpations: Abdomen is soft.   Neurological:      General: No focal deficit present.      Mental Status: He is alert. Mental status is at baseline.           Results Review:       I reviewed the patient's new clinical results.  Results from last 7 days   Lab Units 12/10/23  1130   WBC 10*3/mm3 6.72   HEMOGLOBIN g/dL 12.4*   PLATELETS 10*3/mm3 283     Results from last 7 days   Lab Units 12/11/23  0452 12/10/23  1130   SODIUM mmol/L 142 142   POTASSIUM mmol/L 4.6 3.6   CHLORIDE mmol/L 106 106   CO2 mmol/L 22.7 22.8   BUN mg/dL 11 12   CREATININE mg/dL 0.98 0.96   CALCIUM mg/dL 9.5 9.1   Estimated Creatinine Clearance: 111.1 mL/min (by C-G formula based on SCr of 0.98 mg/dL).      Assessment & Plan   Active Hospital Problems    Diagnosis  POA    **CHI (closed head injury), initial encounter [S09.90XA]  Yes    Alcohol abuse [F10.10]  Yes    Fall [W19.XXXA]  Yes    Concussion [S06.0XAA]  Yes    Late effect of cerebrovascular accident (CVA) [I69.30]  Not Applicable    HTN (hypertension) [I10]  Yes    CVA (cerebral vascular accident) [I63.9]  Yes    Ataxia [R27.0]  Yes    Mixed anxiety depressive disorder [F41.8]  Yes      Resolved Hospital Problems   No resolved problems to display.       PLAN  This is a 58-year-old gentleman with a history of chronic alcohol use hypertension and previous stroke who presents to the hospital after a fall with near syncope  -He may have some postconcussive encephalopathy causing the dizziness and postconcussion syndrome even though his orthostatics were negative this morning he is not on his blood pressure medication and I do think that he would be at high risk for orthostasis given his combination of Norvasc hydralazine and diuretic therapy.  -He has a longstanding history of high blood pressure and has already had a large portion of the secondary workup completed.  Plan on outpatient evaluation for  sleep apnea and currently getting a 24-hour urine.  Renal artery ultrasound completed in the past and negative.  -His blood pressure is better after restarting medications.  Discussed with nursing we will recheck a orthostatic blood pressure an hour after his afternoon dose of hydralazine  -Discussed with cardiology today.  Appreciate their input  -Physical therapy and Occupational Therapy to evaluate  -Mechanical DVT prophylaxis  -Full code    Disposition  Expected Discharge Date: 12/13/2023; Expected Discharge Time:        Alden Jimenez MD  [facility]  12/12/23  13:25 EST

## 2023-12-12 NOTE — PLAN OF CARE
Problem: Adult Inpatient Plan of Care  Goal: Plan of Care Review  Outcome: Ongoing, Progressing  Flowsheets (Taken 12/11/2023 2222)  Plan of Care Reviewed With: patient  Goal: Patient-Specific Goal (Individualized)  Outcome: Ongoing, Progressing  Goal: Absence of Hospital-Acquired Illness or Injury  Outcome: Ongoing, Progressing  Intervention: Identify and Manage Fall Risk  Recent Flowsheet Documentation  Taken 12/11/2023 2200 by Ashley Marinelli RN  Safety Promotion/Fall Prevention: safety round/check completed  Taken 12/11/2023 1944 by Ashley Marinelli RN  Safety Promotion/Fall Prevention: safety round/check completed  Intervention: Prevent Skin Injury  Recent Flowsheet Documentation  Taken 12/11/2023 2200 by Ashley Marinelli RN  Body Position: position changed independently  Taken 12/11/2023 1944 by Ashley Marinelli RN  Body Position: position changed independently  Skin Protection: adhesive use limited  Intervention: Prevent and Manage VTE (Venous Thromboembolism) Risk  Recent Flowsheet Documentation  Taken 12/11/2023 1944 by Ashley Marinelli RN  VTE Prevention/Management: patient refused intervention  Range of Motion: active ROM (range of motion) encouraged  Intervention: Prevent Infection  Recent Flowsheet Documentation  Taken 12/11/2023 1944 by Ashley Marinelli RN  Infection Prevention: single patient room provided  Goal: Optimal Comfort and Wellbeing  Outcome: Ongoing, Progressing  Intervention: Provide Person-Centered Care  Recent Flowsheet Documentation  Taken 12/11/2023 1944 by Ashley Marinelli RN  Trust Relationship/Rapport:   care explained   choices provided  Goal: Readiness for Transition of Care  Outcome: Ongoing, Progressing     Problem: Hypertension Comorbidity  Goal: Blood Pressure in Desired Range  Outcome: Ongoing, Progressing  Intervention: Maintain Blood Pressure Management  Recent Flowsheet Documentation  Taken 12/11/2023 2200 by Ashley Marinelli RN  Medication Review/Management: medications  reviewed  Taken 12/11/2023 1944 by Ashley Marinelli RN  Medication Review/Management: medications reviewed     Problem: Fall Injury Risk  Goal: Absence of Fall and Fall-Related Injury  Outcome: Ongoing, Progressing  Intervention: Identify and Manage Contributors  Recent Flowsheet Documentation  Taken 12/11/2023 2200 by Ashley Marinelli RN  Medication Review/Management: medications reviewed  Taken 12/11/2023 1944 by Ashley Marinelli RN  Medication Review/Management: medications reviewed  Intervention: Promote Injury-Free Environment  Recent Flowsheet Documentation  Taken 12/11/2023 2200 by Ashley Marinelli RN  Safety Promotion/Fall Prevention: safety round/check completed  Taken 12/11/2023 1944 by Ashley Marinelli RN  Safety Promotion/Fall Prevention: safety round/check completed     Problem: Pain Acute  Goal: Acceptable Pain Control and Functional Ability  Outcome: Ongoing, Progressing  Intervention: Prevent or Manage Pain  Recent Flowsheet Documentation  Taken 12/11/2023 2200 by Ashley Marinelli RN  Medication Review/Management: medications reviewed  Taken 12/11/2023 1944 by Ashley Marinelli RN  Sensory Stimulation Regulation: care clustered  Medication Review/Management: medications reviewed  Intervention: Optimize Psychosocial Wellbeing  Recent Flowsheet Documentation  Taken 12/11/2023 1944 by Ashley Marinelli RN  Supportive Measures: active listening utilized  Diversional Activities: television   Goal Outcome Evaluation:  Plan of Care Reviewed With: patient

## 2023-12-12 NOTE — PLAN OF CARE
Problem: Adult Inpatient Plan of Care  Goal: Plan of Care Review  Outcome: Ongoing, Progressing  Flowsheets (Taken 12/12/2023 1704)  Progress: improving  Plan of Care Reviewed With: patient  Goal: Patient-Specific Goal (Individualized)  Outcome: Ongoing, Progressing  Goal: Absence of Hospital-Acquired Illness or Injury  Outcome: Ongoing, Progressing  Intervention: Identify and Manage Fall Risk  Recent Flowsheet Documentation  Taken 12/12/2023 1600 by Brandon Cavazos RN  Safety Promotion/Fall Prevention:   activity supervised   assistive device/personal items within reach   clutter free environment maintained   room organization consistent   toileting scheduled   safety round/check completed   nonskid shoes/slippers when out of bed   fall prevention program maintained  Taken 12/12/2023 1420 by Brandon Cavazos RN  Safety Promotion/Fall Prevention:   activity supervised   assistive device/personal items within reach   clutter free environment maintained   room organization consistent   safety round/check completed   toileting scheduled   nonskid shoes/slippers when out of bed   fall prevention program maintained  Taken 12/12/2023 1225 by Brandon Cavazos RN  Safety Promotion/Fall Prevention:   activity supervised   assistive device/personal items within reach   clutter free environment maintained   toileting scheduled   safety round/check completed   room organization consistent   nonskid shoes/slippers when out of bed   fall prevention program maintained  Taken 12/12/2023 1000 by Brandon Cavazos RN  Safety Promotion/Fall Prevention:   activity supervised   assistive device/personal items within reach   clutter free environment maintained   toileting scheduled   safety round/check completed   room organization consistent   nonskid shoes/slippers when out of bed   fall prevention program maintained  Taken 12/12/2023 0819 by Brandon Cavazos, RN  Safety Promotion/Fall Prevention:   activity supervised   assistive  device/personal items within reach   clutter free environment maintained   toileting scheduled   safety round/check completed   room organization consistent   nonskid shoes/slippers when out of bed   fall prevention program maintained  Intervention: Prevent Skin Injury  Recent Flowsheet Documentation  Taken 12/12/2023 1600 by Brandon Cavazos RN  Body Position:   position changed independently   sitting up in bed  Taken 12/12/2023 1420 by Brandon Cavazos RN  Body Position:   position changed independently   sitting up in bed  Taken 12/12/2023 1225 by Brandon Cavazos RN  Body Position:   position changed independently   sitting up in bed  Taken 12/12/2023 1000 by Brandon Cavazos RN  Body Position:   position changed independently   sitting up in bed  Taken 12/12/2023 0819 by Brandon Cavazos RN  Body Position: supine  Intervention: Prevent and Manage VTE (Venous Thromboembolism) Risk  Recent Flowsheet Documentation  Taken 12/12/2023 0819 by Brandon Cavazos RN  VTE Prevention/Management:   sequential compression devices off   patient refused intervention  Intervention: Prevent Infection  Recent Flowsheet Documentation  Taken 12/12/2023 1600 by Brandon Cavazos RN  Infection Prevention:   hand hygiene promoted   rest/sleep promoted  Taken 12/12/2023 1420 by Brandon Cavazos RN  Infection Prevention:   hand hygiene promoted   rest/sleep promoted  Taken 12/12/2023 1225 by Brandon Cavazos RN  Infection Prevention:   hand hygiene promoted   rest/sleep promoted  Taken 12/12/2023 1000 by Brandon Cavazos RN  Infection Prevention:   hand hygiene promoted   rest/sleep promoted  Taken 12/12/2023 0819 by Brandon Cavazos RN  Infection Prevention:   hand hygiene promoted   rest/sleep promoted  Goal: Optimal Comfort and Wellbeing  Outcome: Ongoing, Progressing  Goal: Readiness for Transition of Care  Outcome: Ongoing, Progressing     Problem: Hypertension Comorbidity  Goal: Blood Pressure in Desired Range  Outcome: Ongoing,  Progressing  Intervention: Maintain Blood Pressure Management  Recent Flowsheet Documentation  Taken 12/12/2023 1600 by Brandon Cavazos RN  Medication Review/Management: medications reviewed  Taken 12/12/2023 1420 by Brandon Cavazos RN  Medication Review/Management: medications reviewed  Taken 12/12/2023 1225 by Brandon Cavazos RN  Medication Review/Management: medications reviewed  Taken 12/12/2023 1000 by Brandon Cavazos RN  Medication Review/Management: medications reviewed  Taken 12/12/2023 0819 by Brandon Cavazos RN  Medication Review/Management: medications reviewed     Problem: Fall Injury Risk  Goal: Absence of Fall and Fall-Related Injury  Outcome: Ongoing, Progressing  Intervention: Identify and Manage Contributors  Recent Flowsheet Documentation  Taken 12/12/2023 1600 by Brandon Cavazos RN  Medication Review/Management: medications reviewed  Taken 12/12/2023 1420 by Brandon Cavazos RN  Medication Review/Management: medications reviewed  Taken 12/12/2023 1225 by Brandon Cavazos RN  Medication Review/Management: medications reviewed  Taken 12/12/2023 1000 by Brandon Cavazos RN  Medication Review/Management: medications reviewed  Taken 12/12/2023 0819 by Brandon Cavazos RN  Medication Review/Management: medications reviewed  Intervention: Promote Injury-Free Environment  Recent Flowsheet Documentation  Taken 12/12/2023 1600 by Brandon Cavazos RN  Safety Promotion/Fall Prevention:   activity supervised   assistive device/personal items within reach   clutter free environment maintained   room organization consistent   toileting scheduled   safety round/check completed   nonskid shoes/slippers when out of bed   fall prevention program maintained  Taken 12/12/2023 1420 by Brandon Cavazos RN  Safety Promotion/Fall Prevention:   activity supervised   assistive device/personal items within reach   clutter free environment maintained   room organization consistent   safety round/check completed    toileting scheduled   nonskid shoes/slippers when out of bed   fall prevention program maintained  Taken 12/12/2023 1225 by Brandon Cavazos RN  Safety Promotion/Fall Prevention:   activity supervised   assistive device/personal items within reach   clutter free environment maintained   toileting scheduled   safety round/check completed   room organization consistent   nonskid shoes/slippers when out of bed   fall prevention program maintained  Taken 12/12/2023 1000 by Brandon Cavazos RN  Safety Promotion/Fall Prevention:   activity supervised   assistive device/personal items within reach   clutter free environment maintained   toileting scheduled   safety round/check completed   room organization consistent   nonskid shoes/slippers when out of bed   fall prevention program maintained  Taken 12/12/2023 0819 by Brandon Cavazos RN  Safety Promotion/Fall Prevention:   activity supervised   assistive device/personal items within reach   clutter free environment maintained   toileting scheduled   safety round/check completed   room organization consistent   nonskid shoes/slippers when out of bed   fall prevention program maintained     Problem: Pain Acute  Goal: Acceptable Pain Control and Functional Ability  Outcome: Ongoing, Progressing  Intervention: Prevent or Manage Pain  Recent Flowsheet Documentation  Taken 12/12/2023 1600 by Brandon Cavazos RN  Medication Review/Management: medications reviewed  Taken 12/12/2023 1420 by Brandon Cavazos RN  Medication Review/Management: medications reviewed  Taken 12/12/2023 1225 by Brandon Cavazos RN  Medication Review/Management: medications reviewed  Taken 12/12/2023 1000 by Brandon Cavazos RN  Medication Review/Management: medications reviewed  Taken 12/12/2023 0819 by Brandon Cavazos RN  Medication Review/Management: medications reviewed   Goal Outcome Evaluation:  Plan of Care Reviewed With: patient        Progress: improving

## 2023-12-13 ENCOUNTER — APPOINTMENT (OUTPATIENT)
Dept: PHYSICAL THERAPY | Facility: HOSPITAL | Age: 58
End: 2023-12-13
Payer: MEDICAID

## 2023-12-13 ENCOUNTER — APPOINTMENT (OUTPATIENT)
Dept: SPEECH THERAPY | Facility: HOSPITAL | Age: 58
End: 2023-12-13
Payer: MEDICAID

## 2023-12-13 ENCOUNTER — APPOINTMENT (OUTPATIENT)
Dept: OCCUPATIONAL THERAPY | Facility: HOSPITAL | Age: 58
End: 2023-12-13
Payer: MEDICAID

## 2023-12-13 ENCOUNTER — READMISSION MANAGEMENT (OUTPATIENT)
Dept: CALL CENTER | Facility: HOSPITAL | Age: 58
End: 2023-12-13
Payer: MEDICAID

## 2023-12-13 VITALS
BODY MASS INDEX: 31.04 KG/M2 | TEMPERATURE: 98.2 F | HEART RATE: 66 BPM | DIASTOLIC BLOOD PRESSURE: 76 MMHG | OXYGEN SATURATION: 91 % | HEIGHT: 74 IN | SYSTOLIC BLOOD PRESSURE: 120 MMHG | WEIGHT: 241.84 LBS | RESPIRATION RATE: 20 BRPM

## 2023-12-13 PROCEDURE — G0378 HOSPITAL OBSERVATION PER HR: HCPCS

## 2023-12-13 PROCEDURE — 82384 ASSAY THREE CATECHOLAMINES: CPT

## 2023-12-13 RX ORDER — CARVEDILOL 12.5 MG/1
12.5 TABLET ORAL 2 TIMES DAILY WITH MEALS
Status: DISCONTINUED | OUTPATIENT
Start: 2023-12-13 | End: 2023-12-13 | Stop reason: HOSPADM

## 2023-12-13 RX ADMIN — CARVEDILOL 6.25 MG: 6.25 TABLET, FILM COATED ORAL at 08:49

## 2023-12-13 RX ADMIN — SPIRONOLACTONE 25 MG: 25 TABLET, FILM COATED ORAL at 08:49

## 2023-12-13 RX ADMIN — HYDROCHLOROTHIAZIDE 12.5 MG: 12.5 TABLET ORAL at 08:49

## 2023-12-13 RX ADMIN — VILAZODONE HYDROCHLORIDE 40 MG: 40 TABLET, FILM COATED ORAL at 08:49

## 2023-12-13 RX ADMIN — ASPIRIN 81 MG: 81 TABLET, COATED ORAL at 08:48

## 2023-12-13 RX ADMIN — Medication 1 TABLET: at 08:49

## 2023-12-13 RX ADMIN — LISINOPRIL 40 MG: 40 TABLET ORAL at 08:49

## 2023-12-13 RX ADMIN — Medication 100 MG: at 08:49

## 2023-12-13 RX ADMIN — HYDROXYZINE HYDROCHLORIDE 25 MG: 25 TABLET ORAL at 05:45

## 2023-12-13 RX ADMIN — HYDRALAZINE HYDROCHLORIDE 100 MG: 50 TABLET, FILM COATED ORAL at 06:44

## 2023-12-13 RX ADMIN — FOLIC ACID 1 MG: 1 TABLET ORAL at 08:49

## 2023-12-13 RX ADMIN — AMLODIPINE BESYLATE 5 MG: 5 TABLET ORAL at 08:48

## 2023-12-13 RX ADMIN — PANTOPRAZOLE SODIUM 40 MG: 40 TABLET, DELAYED RELEASE ORAL at 05:45

## 2023-12-13 NOTE — OUTREACH NOTE
Prep Survey      Flowsheet Row Responses   [facility] patient discharged from? [place]   Is LACE score < 7 ? No   Eligibility [facility]   Date of Admission 12/10/23   Date of Discharge 12/13/23   Discharge Disposition Home or Self Care   Discharge diagnosis CHI (closed head injury), initial encounter   Does the patient have one of the following disease processes/diagnoses(primary or secondary)? Other   Does the patient have Home health ordered? No   Is there a DME ordered? No   Prep survey completed? Yes            Rosalia PRITCHETT - Registered Nurse

## 2023-12-13 NOTE — PLAN OF CARE
Problem: Adult Inpatient Plan of Care  Goal: Plan of Care Review  Outcome: Ongoing, Progressing  Flowsheets (Taken 12/13/2023 0426)  Plan of Care Reviewed With: patient  Goal: Patient-Specific Goal (Individualized)  Outcome: Ongoing, Progressing  Goal: Absence of Hospital-Acquired Illness or Injury  Outcome: Ongoing, Progressing  Intervention: Identify and Manage Fall Risk  Recent Flowsheet Documentation  Taken 12/13/2023 0400 by Ashley Marinelli RN  Safety Promotion/Fall Prevention: safety round/check completed  Taken 12/13/2023 0145 by Ashley Marinelli RN  Safety Promotion/Fall Prevention: safety round/check completed  Taken 12/13/2023 0002 by Ashley Marinelli RN  Safety Promotion/Fall Prevention: safety round/check completed  Taken 12/12/2023 2200 by Ashley Marinelli RN  Safety Promotion/Fall Prevention: safety round/check completed  Taken 12/12/2023 2000 by Ashley Marinelli RN  Safety Promotion/Fall Prevention: safety round/check completed  Intervention: Prevent Skin Injury  Recent Flowsheet Documentation  Taken 12/13/2023 0400 by Ashley Marinelli RN  Body Position: position changed independently  Taken 12/13/2023 0145 by Ashley Marinelli RN  Body Position: position changed independently  Taken 12/13/2023 0002 by Ashley Marinelli RN  Body Position: position changed independently  Taken 12/12/2023 2200 by Ashley Marinelli RN  Body Position: position changed independently  Taken 12/12/2023 2000 by Ashley Marinelli RN  Body Position: position changed independently  Skin Protection: adhesive use limited  Intervention: Prevent and Manage VTE (Venous Thromboembolism) Risk  Recent Flowsheet Documentation  Taken 12/12/2023 2000 by Ashley Marinelli RN  Range of Motion: active ROM (range of motion) encouraged  Intervention: Prevent Infection  Recent Flowsheet Documentation  Taken 12/12/2023 2000 by Ashley Marinelli RN  Infection Prevention: single patient room provided  Goal: Optimal Comfort and Wellbeing  Outcome: Ongoing,  Progressing  Intervention: Provide Person-Centered Care  Recent Flowsheet Documentation  Taken 12/12/2023 2000 by Ashley Marinelli RN  Trust Relationship/Rapport:   care explained   choices provided  Goal: Readiness for Transition of Care  Outcome: Ongoing, Progressing     Problem: Hypertension Comorbidity  Goal: Blood Pressure in Desired Range  Outcome: Ongoing, Progressing  Intervention: Maintain Blood Pressure Management  Recent Flowsheet Documentation  Taken 12/13/2023 0400 by Ashley Marinelli RN  Medication Review/Management: medications reviewed  Taken 12/13/2023 0145 by Ashley Marinelli RN  Medication Review/Management: medications reviewed  Taken 12/13/2023 0002 by Ashley Marinelli RN  Medication Review/Management: medications reviewed  Taken 12/12/2023 2200 by Ashley Marinelli RN  Medication Review/Management: medications reviewed  Taken 12/12/2023 2000 by Ashley Marinelli RN  Medication Review/Management: medications reviewed     Problem: Fall Injury Risk  Goal: Absence of Fall and Fall-Related Injury  Outcome: Ongoing, Progressing  Intervention: Identify and Manage Contributors  Recent Flowsheet Documentation  Taken 12/13/2023 0400 by Ashley Marinelli RN  Medication Review/Management: medications reviewed  Taken 12/13/2023 0145 by Ashley Marinelli RN  Medication Review/Management: medications reviewed  Taken 12/13/2023 0002 by Ashley Marinelli RN  Medication Review/Management: medications reviewed  Taken 12/12/2023 2200 by Ashley Marinelli RN  Medication Review/Management: medications reviewed  Taken 12/12/2023 2000 by Ashley Marinelli RN  Medication Review/Management: medications reviewed  Intervention: Promote Injury-Free Environment  Recent Flowsheet Documentation  Taken 12/13/2023 0400 by Ashley Marinelli RN  Safety Promotion/Fall Prevention: safety round/check completed  Taken 12/13/2023 0145 by Ashley Marinelli RN  Safety Promotion/Fall Prevention: safety round/check completed  Taken 12/13/2023 0002 by Yves  THEA Ramirez  Safety Promotion/Fall Prevention: safety round/check completed  Taken 12/12/2023 2200 by Ashley Marinelli RN  Safety Promotion/Fall Prevention: safety round/check completed  Taken 12/12/2023 2000 by Ashley Marinelli RN  Safety Promotion/Fall Prevention: safety round/check completed     Problem: Pain Acute  Goal: Acceptable Pain Control and Functional Ability  Outcome: Ongoing, Progressing  Intervention: Prevent or Manage Pain  Recent Flowsheet Documentation  Taken 12/13/2023 0400 by Ashley Marinelli RN  Medication Review/Management: medications reviewed  Taken 12/13/2023 0145 by Ashley Marinelli RN  Medication Review/Management: medications reviewed  Taken 12/13/2023 0002 by Ashley Marinelli RN  Medication Review/Management: medications reviewed  Taken 12/12/2023 2200 by Ashley Marinelli RN  Medication Review/Management: medications reviewed  Taken 12/12/2023 2000 by Ashley Marinelli RN  Sensory Stimulation Regulation: care clustered  Medication Review/Management: medications reviewed  Intervention: Optimize Psychosocial Wellbeing  Recent Flowsheet Documentation  Taken 12/12/2023 2000 by Ashley Marinelli RN  Supportive Measures: active listening utilized  Diversional Activities: television   Goal Outcome Evaluation:  Plan of Care Reviewed With: patient

## 2023-12-13 NOTE — PROGRESS NOTES
Case Management Discharge Note      Final Note: Home with assist from son and continued outpatient therapy         Selected Continued Care - Admitted Since 12/10/2023       Destination    No services have been selected for the patient.                Durable Medical Equipment    No services have been selected for the patient.                Dialysis/Infusion    No services have been selected for the patient.                Home Medical Care    No services have been selected for the patient.                Therapy    No services have been selected for the patient.                Community Resources    No services have been selected for the patient.                Community & DME    No services have been selected for the patient.                    Selected Continued Care - Prior Encounters Includes continued care and service providers with selected services from prior encounters from 9/11/2023 to 12/13/2023      Discharged on 11/6/2023 Admission date: 10/26/2023 - Discharge disposition: Rehab Facility or Unit ([facility])      Destination       Service Provider Selected Services Address Phone Fax Patient Preferred    [facility] ACUTE REH[facility] Inpatient Rehabilitation [address] [place] [id number] [phone] -- --                          Transportation Services  Private: Car    Final Discharge Disposition Code: 01 - home or self-care

## 2023-12-14 ENCOUNTER — TRANSITIONAL CARE MANAGEMENT TELEPHONE ENCOUNTER (OUTPATIENT)
Dept: CALL CENTER | Facility: HOSPITAL | Age: 58
End: 2023-12-14
Payer: MEDICAID

## 2023-12-14 NOTE — OUTREACH NOTE
Call Center TCM Note      Flowsheet Row Responses   [facility] patient discharged from? [place]   Does the patient have one of the following disease processes/diagnoses(primary or secondary)? Other   TCM attempt successful? No  [No one listed on PCP office [place].]   Unsuccessful attempts Attempt 1   Call Status Left message            Oliva Benavidez RN    12/14/2023, 10:32 EST

## 2023-12-14 NOTE — OUTREACH NOTE
Call Center TCM Note      Flowsheet Row Responses   [facility] patient discharged from? [place]   Does the patient have one of the following disease processes/diagnoses(primary or secondary)? Other   TCM attempt successful? No   Unsuccessful attempts Attempt 2   Call Status Left message            Oliva Benavidez RN    12/14/2023, 15:25 EST

## 2023-12-14 NOTE — DISCHARGE SUMMARY
Patient Name: Flako Coreas  : 1965  MRN: [id number]    Date of Admission: 12/10/2023  Date of Discharge:  2023  Primary Care Physician: Akash Rodriguez Jr., DO      Chief Complaint:   Syncope and Fall      Discharge Diagnoses     Active Hospital Problems    Diagnosis  POA    **CHI (closed head injury), initial encounter [S09.90XA]  Yes    Alcohol abuse [F10.10]  Yes    Fall [W19.XXXA]  Yes    Concussion [S06.0XAA]  Yes    Late effect of cerebrovascular accident (CVA) [I69.30]  Not Applicable    HTN (hypertension) [I10]  Yes    CVA (cerebral vascular accident) [I63.9]  Yes    Ataxia [R27.0]  Yes    Mixed anxiety depressive disorder [F41.8]  Yes      Resolved Hospital Problems   No resolved problems to display.        Hospital Course     Mr. Coreas is a 58 y.o. male with a history of chronic alcohol use, chronic dizziness previous stroke ataxia and anxiety and depression who presented to [facility] initially complaining of syncope and fall.  Please see the admitting history and physical for further details.  He was found to have dizziness and falls and was admitted to the hospital for further evaluation and treatment.  He had somewhat of a mixed presentation.  Likely has ongoing dizziness encephalopathy and other symptoms both from a postconcussive standpoint but also related to his previous stroke.  There is also some concern that his medications could be contributing to this.  He is on Norvasc hydralazine and a diuretic all of which can cause orthostasis.  His orthostatics however were negative here in the hospital after resuming his home blood pressure medications and blood pressures are stable following this reinitiation of therapy.  He underwent a secondary workup for hypertension here in the hospital and has already had a renal artery ultrasound and referral radiologic workup and did have a 24-hour urine collected during the stay.  Cardiology plans to follow him up in  the office in a few weeks for his blood pressure and ongoing workup.  He is encouraged to get outpatient physical and Occupational Therapy for his dizziness.  The plan was discussed with the patient at the bedside and he stable to discharge home today        Day of Discharge     Subjective:  He is feeling okay today denies new symptoms or complaints has been up ambulating as long as he is careful he is not having any risk of falls.    Physical Exam:     Body mass index is 31.04 kg/m².  Physical Exam  Vitals and nursing note reviewed.   Constitutional:       General: He is not in acute distress.     Appearance: He is obese.   Cardiovascular:      Rate and Rhythm: Normal rate and regular rhythm.   Pulmonary:      Effort: No respiratory distress.      Breath sounds: Normal breath sounds.   Abdominal:      General: Bowel sounds are normal.      Palpations: Abdomen is soft.   Neurological:      General: No focal deficit present.      Mental Status: He is alert. Mental status is at baseline.         Consultants     Consult Orders (all) (From admission, onward)       Start     Ordered    12/10/23 1632  Inpatient Cardiology Consult  Once        Specialty:  Cardiology  Provider:  Jah Ruano MD    12/10/23 1632    12/10/23 1358  [facility] (on-call MD unless specified) Details  Once,   Status:  Canceled        Specialty:  Hospitalist  Provider:  (Not yet assigned)    12/10/23 1357                  Procedures     * Surgery not found *    Imaging Results (All)       Procedure Component Value Units Date/Time    CT Head Without Contrast [[id number]] Collected: 12/10/23 1202     Updated: 12/10/23 1219    Narrative:      CT OF THE BRAIN WITHOUT CONTRAST 12/10/2023     HISTORY: Fell. Head injury.     Axial images were obtained through the brain without intravenous  contrast.     The brain parenchyma and ventricular system appear within normal limits.  No mass lesions, midline shift, intracranial hemorrhage or evidence  of  infarction is demonstrated. No bony abnormalities are seen.       Impression:      1. No acute process.     Radiation dose reduction techniques were utilized, including automated  exposure control and exposure modulation based on body size.        This report was finalized on 12/10/2023 12:16 PM by Dr. Salvador Arita M.D on Workstation: [id number]       XR Chest 1 View [[id number]] Collected: 12/10/23 1140     Updated: 12/10/23 1144    Narrative:      XR CHEST 1 VW-     HISTORY: 58-year-old male with hypertension. Syncope.     FINDINGS: There is no evidence for pneumonia, effusions, or CHF.  Follow-up with 2 views of the chest is recommended.     This report was finalized on 12/10/2023 11:41 AM by Dr. Kelsey Meade M.D  on Workstation: [id number]             Results for orders placed during the hospital encounter of 12/08/22    Duplex Renal Artery - Bilateral Complete CAR    Interpretation Summary    Normal right renal artery.    Normal left renal artery.    Results for orders placed during the hospital encounter of 08/19/23    Adult Transesophageal Echo (MEGHANN) W/ Cont if Necessary Per Protocol    Interpretation Summary    Left ventricular systolic function is hyperdynamic (EF > 70%).    Left ventricular wall thickness is consistent with mild to moderate concentric hypertrophy.    Left ventricular diastolic function is consistent with (grade I) impaired relaxation.    Normal right ventricular cavity size and systolic function noted.    The left atrial cavity is mildly dilated.    No evidence of a left atrial appendage thrombus was present    No evidence of a patent foramen ovale. Saline test results are negative.    There are very mild plaques in the distal descending thoracic aorta. There are no plaques in the aortic arch or ascending aorta    There is no evidence of pericardial effusion    Pertinent Labs     Results from last 7 days   Lab Units 12/10/23  1130   WBC 10*3/mm3 6.72   HEMOGLOBIN g/dL 12.4*  "  PLATELETS 10*3/mm3 283     Results from last 7 days   Lab Units 12/11/23  0452 12/10/23  1130   SODIUM mmol/L 142 142   POTASSIUM mmol/L 4.6 3.6   CHLORIDE mmol/L 106 106   CO2 mmol/L 22.7 22.8   BUN mg/dL 11 12   CREATININE mg/dL 0.98 0.96   GLUCOSE mg/dL 112* 97   EGFR mL/min/1.73 90.5 92.8     Results from last 7 days   Lab Units 12/11/23  0452 12/10/23  1130   ALBUMIN g/dL 4.2 4.2   BILIRUBIN mg/dL 0.7 0.5   ALK PHOS U/L 69 66   AST (SGOT) U/L 36 22   ALT (SGPT) U/L 56* 42*     Results from last 7 days   Lab Units 12/11/23  0452 12/10/23  1130   CALCIUM mg/dL 9.5 9.1   ALBUMIN g/dL 4.2 4.2               Invalid input(s): \"LDLCALC\"          Test Results Pending at Discharge     Pending Labs       Order Current Status    Catecholamines, Fractionated, Urine, 24 Hour - Urine, Clean Catch In process            Discharge Details        Discharge Medications        Continue These Medications        Instructions Start Date   acetaminophen 325 MG tablet  Commonly known as: TYLENOL   650 mg, Oral, Every 6 Hours PRN      amLODIPine 5 MG tablet  Commonly known as: NORVASC   5 mg, Oral, Daily      aspirin 81 MG EC tablet   81 mg, Oral, Daily      atorvastatin 80 MG tablet  Commonly known as: LIPITOR   80 mg, Oral, Nightly      carvedilol 6.25 MG tablet  Commonly known as: COREG   6.25 mg, Oral, 2 Times Daily With Meals      folic acid 1 MG tablet  Commonly known as: FOLVITE   1 mg, Oral, Daily      hydrALAZINE 100 MG tablet  Commonly known as: APRESOLINE   100 mg, Oral, Every 8 Hours Scheduled      hydroCHLOROthiazide 12.5 MG tablet  Commonly known as: HYDRODIURIL   12.5 mg, Oral, Daily      hydrOXYzine 25 MG tablet  Commonly known as: ATARAX   25 mg, Oral, 3 Times Daily PRN      lisinopril 40 MG tablet  Commonly known as: PRINIVIL,ZESTRIL   40 mg, Oral, Daily      melatonin 5 MG tablet tablet   5 mg, Oral, Nightly      One-Daily Multi-Vitamin tablet tablet  Generic drug: multivitamin   1 tablet, Oral, Daily    "   pantoprazole 40 MG EC tablet  Commonly known as: PROTONIX   40 mg, Oral, Every Early Morning      Senexon-S 8.6-50 MG per tablet  Generic drug: sennosides-docusate   2 tablets, Oral, 2 Times Daily      spironolactone 25 MG tablet  Commonly known as: ALDACTONE   25 mg, Oral, Daily      thiamine 100 MG tablet  Commonly known as: VITAMIN B1   100 mg, Oral, Daily      vilazodone 40 MG tablet tablet  Commonly known as: Viibryd   40 mg, Oral, Daily               No Known Allergies    Discharge Disposition:  Home or Self Care      Discharge Diet:  No active diet order      Discharge Activity:   Activity Instructions       Activity as Tolerated              CODE STATUS:    Code Status and Medical Interventions:   Ordered at: 12/10/23 1854     Code Status (Patient has no pulse and is not breathing):    CPR (Attempt to Resuscitate)     Medical Interventions (Patient has pulse or is breathing):    Full Support       Future Appointments   Date Time Provider Department Center   12/18/2023 10:15 AM Alisa Rudd, PT [facility]   12/18/2023 11:00 AM Albert Dexter SLP [facility]   12/20/2023  3:15 PM Dorina Clements, PT [facility]   12/20/2023  4:00 PM Albert Dexter SLP [facility]   12/22/2023 10:15 AM Chinmay Taylor MD [facility]   12/27/2023  3:15 PM Dorina Clements, PT [facility]   12/27/2023  4:00 PM Albert Dexter SLP [facility]   1/3/2024  3:15 PM Dorina Clements, PT [facility]   1/3/2024  4:00 PM Albert Dexter SLP [facility]   1/8/2024 10:15 AM Alisa Rudd, PT [facility]   1/8/2024 11:00 AM Albert Dexter SLP [facility]   1/10/2024  3:15 PM Dorina Clements, PT [facility]   1/10/2024  4:00 PM Albert Dexter SLP [facility]   1/15/2024 10:15 AM Alisa Rudd, PT [facility]   1/15/2024 11:00 AM Albert Dexter SLP  [facility]   1/17/2024  3:15 PM Dorina Clements, PT [facility]   1/17/2024  4:00 PM Albert Dexter SLP [facility]   1/22/2024 10:15 AM  Alisa Rudd, PT [facility]   1/22/2024 11:00 AM Albert Dexter, SLP [facility]   1/24/2024  3:15 PM Dorina Clements, PT [facility]   1/24/2024  4:00 PM Albert Dexter, SLP [facility]   1/26/2024  2:00 PM Kelsey Martinez, APRN [facility] None   1/29/2024 10:15 AM Alisa Rudd, PT [facility]   1/29/2024 11:00 AM Albert Dexter, SLP [facility]   2/13/2024  3:30 PM Sravanthi Mcmahon, APRN [facility]     Additional Instructions for the Follow-ups that You Need to Schedule       Ambulatory Referral to Sleep Medicine   As directed      Follow-up needed: Yes        Discharge Follow-up with PCP   As directed       Currently Documented PCP:    Akash Rodriguez Jr., DO    PCP Phone Number:    [phone]     Follow Up Details: 2-3 weeks        Discharge Follow-up with Specified Provider: cardiology; 1 Month   As directed      To: cardiology   Follow Up: 1 Month               Follow-up Information       Akash Rodriguez Jr., DO .    Specialties: Sports Medicine, Family Medicine, Emergency Medicine  Why: 2-3 weeks  Contact information:  [address] [address]  [phone]                             Additional Instructions for the Follow-ups that You Need to Schedule       Ambulatory Referral to Sleep Medicine   As directed      Follow-up needed: Yes        Discharge Follow-up with PCP   As directed       Currently Documented PCP:    Akash Rodriguez Jr., DO    PCP Phone Number:    [phone]     Follow Up Details: 2-3 weeks        Discharge Follow-up with Specified Provider: cardiology; 1 Month   As directed      To: cardiology   Follow Up: 1 Month            Time Spent on Discharge:  Greater than 30 minutes      Alden Jimenez MD  [facility]  12/14/23  15:42 EST

## 2023-12-15 ENCOUNTER — TRANSITIONAL CARE MANAGEMENT TELEPHONE ENCOUNTER (OUTPATIENT)
Dept: CALL CENTER | Facility: HOSPITAL | Age: 58
End: 2023-12-15
Payer: MEDICAID

## 2023-12-15 NOTE — OUTREACH NOTE
Call Center TCM Note      Flowsheet Row Responses   [facility] patient discharged from? [place]   Does the patient have one of the following disease processes/diagnoses(primary or secondary)? Other   TCM attempt successful? Yes   Call start time 1414   Call end time 1421   Meds reviewed with patient/caregiver? Yes   Is the patient having any side effects they believe may be caused by any medication additions or changes? No   Does the patient have all medications ordered at discharge? N/A   Is the patient taking all medications as directed (includes completed medication regime)? Yes   Comments Cardiology appt 12/22/23. Patient declined to schedule PCP hospital f/u appt at this time.   Does the patient have an appointment with their PCP within 7-14 days of discharge? No   Nursing Interventions Patient declined scheduling/rescheduling appointment at this time, Routed TCM call to PCP office   Has home health visited the patient within 72 hours of discharge? N/A   Home health comments Attending outpatient therapy on Mon and Wed.   Psychosocial issues? No   Psychosocial comments Patient states has everything that he needs at this time.   Did the patient receive a copy of their discharge instructions? Yes   Nursing interventions Reviewed instructions with patient   What is the patient's perception of their health status since discharge? Same   Is the patient/caregiver able to teach back signs and symptoms related to disease process for when to call PCP? Yes   Is the patient/caregiver able to teach back signs and symptoms related to disease process for when to call 911? Yes   Is the patient/caregiver able to teach back the hierarchy of who to call/visit for symptoms/problems? PCP, Specialist, Home health nurse, Urgent Care, ED, 911 Yes   If the patient is a current smoker, are they able to teach back resources for cessation? Not a smoker   Additional teach back comments Monitors BP at home-encouraged to keep record  for appts.   TCM call completed? Yes   Wrap up additional comments Patient states is about the same. States prism glasses have been ordered for him, and should be receiving by mail soon. Believes this will help relieve the dizziness. States has not checked BP, but BP was 150/90 yesterday. Encouraged to check BP daily, and call PCP if remains elevated. Denies any needs today. TCM complete.   Call end time 1421   Would this patient benefit from a Referral to [facility] Social Work? No   Is the patient interested in additional calls from an ambulatory ? No            Oliva Benavidez RN    12/15/2023, 14:22 EST

## 2023-12-18 ENCOUNTER — APPOINTMENT (OUTPATIENT)
Dept: OCCUPATIONAL THERAPY | Facility: HOSPITAL | Age: 58
End: 2023-12-18
Payer: MEDICAID

## 2023-12-18 ENCOUNTER — APPOINTMENT (OUTPATIENT)
Dept: SPEECH THERAPY | Facility: HOSPITAL | Age: 58
End: 2023-12-18
Payer: MEDICAID

## 2023-12-18 ENCOUNTER — APPOINTMENT (OUTPATIENT)
Dept: PHYSICAL THERAPY | Facility: HOSPITAL | Age: 58
End: 2023-12-18
Payer: MEDICAID

## 2023-12-20 ENCOUNTER — DOCUMENTATION (OUTPATIENT)
Dept: SPEECH THERAPY | Facility: HOSPITAL | Age: 58
End: 2023-12-20
Payer: MEDICAID

## 2023-12-20 ENCOUNTER — APPOINTMENT (OUTPATIENT)
Dept: SPEECH THERAPY | Facility: HOSPITAL | Age: 58
End: 2023-12-20
Payer: MEDICAID

## 2023-12-20 ENCOUNTER — APPOINTMENT (OUTPATIENT)
Dept: OCCUPATIONAL THERAPY | Facility: HOSPITAL | Age: 58
End: 2023-12-20
Payer: MEDICAID

## 2023-12-20 ENCOUNTER — APPOINTMENT (OUTPATIENT)
Dept: PHYSICAL THERAPY | Facility: HOSPITAL | Age: 58
End: 2023-12-20
Payer: MEDICAID

## 2023-12-20 DIAGNOSIS — R41.841 COGNITIVE COMMUNICATION DISORDER: Primary | ICD-10-CM

## 2023-12-20 LAB
DOPAMINE 24H UR-MRATE: 126 UG/24 HR (ref 0–510)
DOPAMINE UR-MCNC: 210 UG/L
EPINEPH 24H UR-MRATE: 3 UG/24 HR (ref 0–20)
EPINEPH UR-MCNC: 5 UG/L
NOREPINEPH 24H UR-MRATE: 68 UG/24 HR (ref 0–135)
NOREPINEPH UR-MCNC: 114 UG/L

## 2023-12-20 NOTE — THERAPY DISCHARGE NOTE
Speech Language Pathology Discharge Summary         Patient Name: Flako Coreas  : 1965  MRN: [id number]    Today's Date: 2023       SLP OP Goals       Row Name 23 1600          Subjective Comments    Subjective Comments Self-discharge per policy. Pt has not shown up to 2 consecutive appointments.  -BB        Verbal Expression Goals    Verbal Expression LTG's Patient will be able to use verbal expressive language skills to communicate effectively in all situations with unfamiliar listener  -BB     Patient will be able to use verbal expressive language skills to communicate effectively in all situations with unfamiliar listener 90%:;without cues  -BB     Status: Patient will be able to use verbal expressive language skills to communicate effectively in all situations with unfamiliar listener Discontinued  -BB     Comments: Patient will be able to use verbal expressive language skills to communicate effectively in all situations with unfamiliar listener Unable to formally assess 2/2 unexpected d/c.  -BB     Verbal Expression STG's Patient will improve verbal expressive language skills by utilizing self-cueing strategies when encountering difficulty with expressive language  -BB     Patient will improve verbal expressive language skills by utilizing self-cueing strategies when encountering difficulty with expressive language 90%:;without cues  -BB     Status: Patient will improve verbal expressive language skills by utilizing self-cueing strategies when encountering difficulty with expressive language Discontinued  -BB     Comments: Patient will improve verbal expressive language skills by utilizing self-cueing strategies when encountering difficulty with expressive language Unable to formally assess 2/2 unexpected d/c.  -BB        Memory Goals    Memory LTG's Patient will be able to remember information needed to return to work and function on work-related tasks  -BB     Patient will be able to  remember information needed to return to work and function on work-related tasks 90%:;without cues  -BB     Status: Patient will be able to remember information needed to return to work and function on work-related tasks Discontinued  -BB     Comments: Patient will be able to remember information needed to return to work and function on work-related tasks Unable to formally assess 2/2 unexpected d/c.  -BB     Memory STG's Patient’s memory skills will be enhanced as reported by patient by utilizing internal memory strategies to recall up to 3 pieces of information after a 5- minute delay  -BB     Patient’s memory skills will be enhanced as reported by patient by utilizing internal memory strategies to recall up to 3 pieces of information after a 5- minute delay 90%:;without cues  -BB     Status: Patient’s memory skills will be enhanced as reported by patient by utilizing internal memory strategies to recall up to 3 pieces of information after a 5- minute delay Discontinued  -BB     Comments: Patient’s memory skills will be enhanced as reported by patient by utilizing internal memory strategies to recall up to 3 pieces of information after a 5- minute delay Unable to formally assess 2/2 unexpected d/c.  -BB        Attention/Orientation Goals    Attention/Orientation LTG's Patient will be able to use high level cognitive skills to allow patient to return to work  -BB     Patient will be able to use high level cognitive skills to allow patient to return to work Independently  -BB     Status: Patient will be able to use high level cognitive skills to allow patient to return to work Discontinued  -BB     Comments: Patient will be able to use high level cognitive skills to allow patient to return to work Unable to formally assess 2/2 unexpected d/c.  -BB     Attention/Orientation STG's Patient will improve attention skills by sustaining focus to high-level cognitive tasks in order to complete task  -BB     Patient will  improve attention skills by sustaining focus to high-level cognitive tasks in order to complete task 90%:;without cues  -BB     Status: Patient will improve attention skills by sustaining focus to high-level cognitive tasks in order to complete task Discontinued  -BB     Comments: Patient will improve attention skills by sustaining focus to high-level cognitive tasks in order to complete task Unable to formally assess 2/2 unexpected d/c.  -BB               User Key  (r) = Recorded By, (t) = Taken By, (c) = Cosigned By      Initials Name Provider Type    Albert Rowland, SLP Speech and Language Pathologist                    OP SLP Discharge Summary  Date of Discharge: 12/20/23  Reason for Discharge: other (see comments) (self-discharge per policy)  Progress Toward Achieving Short/long Term Goals: goals not met within established timelines (Unable to formally assess 2/2 unexpected d/c.)      Time Calculation:                    ANA LAURA Hoyos  12/20/2023

## 2023-12-21 ENCOUNTER — READMISSION MANAGEMENT (OUTPATIENT)
Dept: CALL CENTER | Facility: HOSPITAL | Age: 58
End: 2023-12-21
Payer: MEDICAID

## 2023-12-21 NOTE — OUTREACH NOTE
Medical Week 2 Survey      Flowsheet Row Responses   [facility] [place] patient discharged from? [place]   Does the patient have one of the following disease processes/diagnoses(primary or secondary)? Other   Week 2 attempt successful? No   Unsuccessful attempts Attempt 1            Nicole BARRIOS - Registered Nurse

## 2023-12-27 ENCOUNTER — READMISSION MANAGEMENT (OUTPATIENT)
Dept: CALL CENTER | Facility: HOSPITAL | Age: 58
End: 2023-12-27
Payer: MEDICAID

## 2023-12-27 ENCOUNTER — APPOINTMENT (OUTPATIENT)
Dept: OCCUPATIONAL THERAPY | Facility: HOSPITAL | Age: 58
End: 2023-12-27
Payer: MEDICAID

## 2023-12-27 ENCOUNTER — APPOINTMENT (OUTPATIENT)
Dept: SPEECH THERAPY | Facility: HOSPITAL | Age: 58
End: 2023-12-27
Payer: MEDICAID

## 2023-12-27 ENCOUNTER — APPOINTMENT (OUTPATIENT)
Dept: PHYSICAL THERAPY | Facility: HOSPITAL | Age: 58
End: 2023-12-27
Payer: MEDICAID

## 2024-01-19 ENCOUNTER — OFFICE VISIT (OUTPATIENT)
Dept: CARDIOLOGY | Facility: CLINIC | Age: 59
End: 2024-01-19
Payer: MEDICAID

## 2024-01-19 VITALS
WEIGHT: 236.8 LBS | BODY MASS INDEX: 30.39 KG/M2 | DIASTOLIC BLOOD PRESSURE: 76 MMHG | HEIGHT: 74 IN | SYSTOLIC BLOOD PRESSURE: 118 MMHG | OXYGEN SATURATION: 96 % | HEART RATE: 67 BPM

## 2024-01-19 DIAGNOSIS — R09.89 LABILE HYPERTENSION: ICD-10-CM

## 2024-01-19 DIAGNOSIS — I63.442 CEREBROVASCULAR ACCIDENT (CVA) DUE TO EMBOLISM OF LEFT CEREBELLAR ARTERY: ICD-10-CM

## 2024-01-19 DIAGNOSIS — E78.2 MIXED HYPERLIPIDEMIA: Chronic | ICD-10-CM

## 2024-01-19 DIAGNOSIS — R55 RECURRENT SYNCOPE: ICD-10-CM

## 2024-01-19 DIAGNOSIS — I10 HYPERTENSION, UNSPECIFIED TYPE: Primary | ICD-10-CM

## 2024-01-19 DIAGNOSIS — I69.30 LATE EFFECT OF CEREBROVASCULAR ACCIDENT (CVA): ICD-10-CM

## 2024-01-19 PROCEDURE — 1160F RVW MEDS BY RX/DR IN RCRD: CPT | Performed by: STUDENT IN AN ORGANIZED HEALTH CARE EDUCATION/TRAINING PROGRAM

## 2024-01-19 PROCEDURE — 1159F MED LIST DOCD IN RCRD: CPT | Performed by: STUDENT IN AN ORGANIZED HEALTH CARE EDUCATION/TRAINING PROGRAM

## 2024-01-19 PROCEDURE — 3078F DIAST BP <80 MM HG: CPT | Performed by: STUDENT IN AN ORGANIZED HEALTH CARE EDUCATION/TRAINING PROGRAM

## 2024-01-19 PROCEDURE — 3074F SYST BP LT 130 MM HG: CPT | Performed by: STUDENT IN AN ORGANIZED HEALTH CARE EDUCATION/TRAINING PROGRAM

## 2024-01-19 PROCEDURE — 99214 OFFICE O/P EST MOD 30 MIN: CPT | Performed by: STUDENT IN AN ORGANIZED HEALTH CARE EDUCATION/TRAINING PROGRAM

## 2024-01-19 PROCEDURE — 93000 ELECTROCARDIOGRAM COMPLETE: CPT | Performed by: STUDENT IN AN ORGANIZED HEALTH CARE EDUCATION/TRAINING PROGRAM

## 2024-01-19 RX ORDER — METOPROLOL SUCCINATE 50 MG/1
50 TABLET, EXTENDED RELEASE ORAL DAILY
Qty: 90 TABLET | Refills: 3 | Status: SHIPPED | OUTPATIENT
Start: 2024-01-19

## 2024-01-19 NOTE — PATIENT INSTRUCTIONS
We will place a loop recorder to monitor your heartbeat continuously, to check for strokes and Afib.     To help reduce blood pressure shifts, I would like for you to stop the carvedilol medication and we are replacing with metoprolol. This is less likely to cause BP shifts.

## 2024-01-19 NOTE — PROGRESS NOTES
[facility]    Subjective:     Encounter Date:01/19/24      Patient ID: Flako Coreas is a 58 y.o. male.    Chief Complaint:   Chief Complaint   Patient presents with    Cerebellar stroke      Patient is in the office today for his 3 month follow up appointment.       History of Present Illness    Mr. Coreas is a very pleasant 58-year-old gentleman who had a rather complicated 2023.  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  He is worn a 30-day and a 2-week patch Holter which did not reveal any significant atrial fibrillation.    He has had ongoing gait disturbances due to his cerebellar stroke, but he is also suffered with intermittent syncopal episodes, which were presumed due to orthostatic hypotension and labile hypertension as episodes.  He was hospitalized in December for this.  Ultimately, his blood pressure regimen seems to have stabilized, but he still has intermittent syncopal episodes, most frequently happen in the morning.  Sometimes he will feel lightheadedness, and then he gets numbness in his lips, followed by tunnel vision.  Thankfully he never really passes out.  Unfortunately, due to these issues he does not drive anymore.  It is affecting his quality of life.    Previous cardiac testing:  MEGHANN August 22, 2023:  Left ventricular systolic function is hyperdynamic (EF > 70%).    Left ventricular wall thickness is consistent with mild to moderate concentric hypertrophy.    Left ventricular diastolic function is consistent with (grade I) impaired relaxation.    Normal right ventricular cavity size and systolic function noted.    The left atrial cavity is mildly dilated.    No evidence of a left atrial appendage thrombus was present    No evidence of a patent foramen ovale. Saline test results are negative.    There are very mild plaques in the distal descending thoracic aorta. There are no plaques in the aortic arch or ascending aorta    There is  no evidence of pericardial effusion    Stress test October 2, 2023:    Equivocal ECG evidence of myocardial ischemia.  This was a submaximal stress test.  73% of age-predicted heart rate achieved.    Negative clinical evidence of myocardial ischemia.     30-day event recorder October 20, 2023:  A normal monitor study.     14-day patch Holter: November 17, 2023:  There were 14 episodes of nonsustained supraventricular tachycardia, longest lasting 12 beats and the fastest with a rate of 197 bpm.  No sustained arrhythmias or pauses.      The following portions of the patient's history were reviewed and updated as appropriate: allergies, current medications, past family history, past medical history, past social history, past surgical history and problem list.    Past Medical History:   Diagnosis Date    ADHD (attention deficit hyperactivity disorder)     On going issue    Anxiety     Lepe's esophagus     Benign prostatic hyperplasia Surgery in 12/2021    Clotting disorder Intestinal    Depression     Diverticulitis     Diverticulosis     Duodenitis     Enteritis     Failure to thrive (child)     Family history of blood clots     GERD (gastroesophageal reflux disease)     Hyperlipidemia     Hypertension     Hypertensive emergency     Low testosterone     Microcytosis     Pancreatitis     Pneumonia     PONV (postoperative nausea and vomiting)     Seasonal affective disorder     Shoulder injury     Stroke     Unexplained weight loss        Past Surgical History:   Procedure Laterality Date    COLON SURGERY      COLONOSCOPY      COLONOSCOPY N/A 12/11/2022    Procedure: COLONOSCOPY INTO CECUM WITH HOT SNARE POLYPECTOMY;  Surgeon: Albert Gallo MD;  Location: [facility] ENDOSCOPY;  Service: Gastroenterology;  Laterality: N/A;  PRE- GI BLEED  POST- DIVERTICULOSIS, POLYP    ENDOSCOPY N/A 12/10/2022    Procedure: ESOPHAGOGASTRODUODENOSCOPY;  Surgeon: Albert Gallo MD;  Location: [facility] ENDOSCOPY;  Service:  "Gastroenterology;  Laterality: N/A;  PRE- DARK STOOLS  POST- BARRETTS ESOPHAGUS    FRACTURE SURGERY  1980    for broken arm    FRACTURE SURGERY      for broken hand    INCISION AND DRAINAGE ABSCESS  2015    anal    PROSTATE SURGERY      SMALL INTESTINE SURGERY      TONSILLECTOMY             ECG 12 Lead    Date/Time: 1/19/2024 1:32 PM  Performed by: Chinmay Taylor MD    Authorized by: Chinmay Taylor MD  Comparison: compared with previous ECG from 12/10/2023  Similar to previous ECG  Rhythm: sinus rhythm  Rate: normal  BPM: 59  Conduction: conduction normal  Q waves: III    ST Segments: ST segments normal  T Waves: T waves normal  QRS axis: normal  Other: no other findings    Clinical impression: non-specific ECG  Comments: Borderline Q wave noted in lead aVF, otherwise unremarkable EKG.  Similar to previous EKG.             Objective:     Vitals:    01/19/24 1254   BP: 118/76   Pulse: 67   SpO2: 96%   Weight: 107 kg (236 lb 12.8 oz)   Height: 188 cm (74.02\")         Constitutional:       Appearance: Healthy appearance. Not in distress.      Comments: Unstable gait.  Otherwise unremarkable exam.   Neck:      Vascular: JVD normal.   Pulmonary:      Effort: Pulmonary effort is normal.      Breath sounds: Normal breath sounds.   Cardiovascular:      PMI at left midclavicular line. Normal rate. Regular rhythm. Normal S2.       Murmurs: There is no murmur.   Pulses:     Intact distal pulses.   Edema:     Peripheral edema absent.   Skin:     General: Skin is warm and dry.   Neurological:      General: No focal deficit present.      Mental Status: Alert, oriented to person, place, and time and oriented to person, place and time.   Psychiatric:         Mood and Affect: Mood and affect normal.         Lab Review:     Lipid Panel          8/16/2023    14:58 8/20/2023    06:12 10/24/2023    06:37   Lipid Panel   Total Cholesterol 194  117  127    Triglycerides 194  115  88    HDL Cholesterol 41  34  39    VLDL Cholesterol 34  21 "  17    LDL Cholesterol  119  62  71    LDL/HDL Ratio 2.79  1.76  1.81      BUN   Date Value Ref Range Status   12/11/2023 11 6 - 20 mg/dL Final   08/27/2022 16 8 - 26 mg/dL Final     Creatinine   Date Value Ref Range Status   12/11/2023 0.98 0.76 - 1.27 mg/dL Final   10/23/2023 1.30 0.60 - 1.30 mg/dL Final     Comment:     Serial Number: [id number]:  [id number]   08/27/2022 0.96 0.73 - 1.18 mg/dL Final   09/28/2018 0.93 0.73 - 1.22 mg/dL Final     Potassium   Date Value Ref Range Status   12/11/2023 4.6 3.5 - 5.2 mmol/L Final     Comment:     Slight hemolysis detected by analyzer. Result may be falsely elevated.   08/27/2022 3.7 3.5 - 5.1 mmol/L Final     ALT (SGPT)   Date Value Ref Range Status   12/11/2023 56 (H) 1 - 41 U/L Final   08/27/2022 20 0 - 55 U/L Final     AST (SGOT)   Date Value Ref Range Status   12/11/2023 36 1 - 40 U/L Final   08/27/2022 20 5 - 34 U/L Final         Performed        Assessment:          Diagnosis Plan   1. Hypertension, unspecified type  ECG 12 Lead      2. Mixed hyperlipidemia  ECG 12 Lead      3. 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 Late effect of cerebrovascular accident (CVA)        5. Recurrent syncope  Case Request Cath Lab: Loop insertion      6. Labile hypertension               Plan:         Intermittent syncopal episodes: Poss related to labile hypertension.  Ports a prodrome of lightheadedness, symptoms with tunnel vision as well as numbness in his lips.  However, he would benefit from further monitoring to rule out arrhythmia as cause.  Management of blood pressure per below.  Proceed with Linq implant, also due to #3  Hypertension, labile: He has had some labile blood pressure shifts, but his blood pressure has been better controlled since he cut out alcohol.    Will stop carvedilol in favor of starting metoprolol to see if this reduces some of his orthostatic shifts.  Otherwise, he was  carefully titrated on a regimen in the hospital, little bit unusual but I would continue that regimen.  He is on spironolactone 25 daily, hydralazine 100 3 times daily, HCTZ 12.5 daily, lisinopril 40 daily,  Secondary workup obtained in the hospital was otherwise unremarkable.  24-hour catecholamines were negative  Possible KRIS.  He has sleep clinic evaluation next week  CVA, suspected embolic: He has had 2 different Holters which did not reveal any significant arrhythmia cause, however he is otherwise young.  He reports he cut out alcohol altogether.  Source of the stroke was never really seen.  Given his recurrent syncopal episodes, as well as a CVA, we will proceed with a Linq implant.  He has neurology appointment next month  Residual gait issues noted due to cerebellar CVA         RTC 3 months for blood pressure and symptom check-in.  Arranging for Linq implant in the interim.  Will trial metoprolol to see if that reduces some of his orthostatic symptoms.    Chinmay Taylor MD  [facility]  01/19/24  13:26 EST       Current Outpatient Medications:     acetaminophen (TYLENOL) 325 MG tablet, Take 2 tablets by mouth Every 6 (Six) Hours As Needed for Mild Pain., Disp: , Rfl:     amLODIPine (NORVASC) 5 MG tablet, Take 1 tablet by mouth Daily., Disp: , Rfl:     aspirin 81 MG EC tablet, Take 1 tablet by mouth Daily., Disp: 90 tablet, Rfl: 1    atorvastatin (LIPITOR) 80 MG tablet, Take 1 tablet by mouth Every Night., Disp: 30 tablet, Rfl: 0    folic acid (FOLVITE) 1 MG tablet, Take 1 tablet by mouth Daily., Disp: 90 tablet, Rfl: 0    hydrALAZINE (APRESOLINE) 100 MG tablet, Take 1 tablet by mouth Every 8 (Eight) Hours., Disp: 270 tablet, Rfl: 1    hydroCHLOROthiazide (HYDRODIURIL) 12.5 MG tablet, Take 1 tablet by mouth Daily., Disp: 30 tablet, Rfl: 1    hydrOXYzine (ATARAX) 25 MG tablet, Take 1 tablet by mouth 3 (Three) Times a Day As Needed for Anxiety., Disp: 90 tablet, Rfl: 0    lisinopril  (PRINIVIL,ZESTRIL) 40 MG tablet, Take 1 tablet by mouth Daily., Disp: 90 tablet, Rfl: 1    melatonin 5 MG tablet tablet, Take 1 tablet by mouth Every Night., Disp: 90 tablet, Rfl: 0    multivitamin (One-Daily Multi-Vitamin) tablet tablet, Take 1 tablet by mouth Daily., Disp: 90 tablet, Rfl: 0    pantoprazole (PROTONIX) 40 MG EC tablet, Take 1 tablet by mouth Every Morning., Disp: 30 tablet, Rfl: 1    sennosides-docusate (PERICOLACE) 8.6-50 MG per tablet, Take 2 tablets by mouth 2 (Two) Times a Day., Disp: 120 tablet, Rfl: 0    spironolactone (ALDACTONE) 25 MG tablet, Take 1 tablet by mouth Daily., Disp: 90 tablet, Rfl: 1    thiamine (VITAMIN B1) 100 MG tablet, Take 1 tablet by mouth Daily., Disp: 90 tablet, Rfl: 0    vilazodone (Viibryd) 40 MG tablet tablet, Take 1 tablet by mouth Daily., Disp: 90 tablet, Rfl: 1    metoprolol succinate XL (TOPROL-XL) 50 MG 24 hr tablet, Take 1 tablet by mouth Daily., Disp: 90 tablet, Rfl: 3         Return in about 3 months (around 4/19/2024).      Part of this note may be an electronic transcription/translation of spoken language to printed text using the Dragon Dictation System.

## 2024-01-19 NOTE — H&P (VIEW-ONLY)
[facility]    Subjective:     Encounter Date:01/19/24      Patient ID: Flako Coreas is a 58 y.o. male.    Chief Complaint:   Chief Complaint   Patient presents with    Cerebellar stroke      Patient is in the office today for his 3 month follow up appointment.       History of Present Illness    Mr. Coreas is a very pleasant 58-year-old gentleman who had a rather complicated 2023.  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  He is worn a 30-day and a 2-week patch Holter which did not reveal any significant atrial fibrillation.    He has had ongoing gait disturbances due to his cerebellar stroke, but he is also suffered with intermittent syncopal episodes, which were presumed due to orthostatic hypotension and labile hypertension as episodes.  He was hospitalized in December for this.  Ultimately, his blood pressure regimen seems to have stabilized, but he still has intermittent syncopal episodes, most frequently happen in the morning.  Sometimes he will feel lightheadedness, and then he gets numbness in his lips, followed by tunnel vision.  Thankfully he never really passes out.  Unfortunately, due to these issues he does not drive anymore.  It is affecting his quality of life.    Previous cardiac testing:  MEGHANN August 22, 2023:  Left ventricular systolic function is hyperdynamic (EF > 70%).    Left ventricular wall thickness is consistent with mild to moderate concentric hypertrophy.    Left ventricular diastolic function is consistent with (grade I) impaired relaxation.    Normal right ventricular cavity size and systolic function noted.    The left atrial cavity is mildly dilated.    No evidence of a left atrial appendage thrombus was present    No evidence of a patent foramen ovale. Saline test results are negative.    There are very mild plaques in the distal descending thoracic aorta. There are no plaques in the aortic arch or ascending aorta    There is  no evidence of pericardial effusion    Stress test October 2, 2023:    Equivocal ECG evidence of myocardial ischemia.  This was a submaximal stress test.  73% of age-predicted heart rate achieved.    Negative clinical evidence of myocardial ischemia.     30-day event recorder October 20, 2023:  A normal monitor study.     14-day patch Holter: November 17, 2023:  There were 14 episodes of nonsustained supraventricular tachycardia, longest lasting 12 beats and the fastest with a rate of 197 bpm.  No sustained arrhythmias or pauses.      The following portions of the patient's history were reviewed and updated as appropriate: allergies, current medications, past family history, past medical history, past social history, past surgical history and problem list.    Past Medical History:   Diagnosis Date    ADHD (attention deficit hyperactivity disorder)     On going issue    Anxiety     Lepe's esophagus     Benign prostatic hyperplasia Surgery in 12/2021    Clotting disorder Intestinal    Depression     Diverticulitis     Diverticulosis     Duodenitis     Enteritis     Failure to thrive (child)     Family history of blood clots     GERD (gastroesophageal reflux disease)     Hyperlipidemia     Hypertension     Hypertensive emergency     Low testosterone     Microcytosis     Pancreatitis     Pneumonia     PONV (postoperative nausea and vomiting)     Seasonal affective disorder     Shoulder injury     Stroke     Unexplained weight loss        Past Surgical History:   Procedure Laterality Date    COLON SURGERY      COLONOSCOPY      COLONOSCOPY N/A 12/11/2022    Procedure: COLONOSCOPY INTO CECUM WITH HOT SNARE POLYPECTOMY;  Surgeon: Albert Gallo MD;  Location: [facility] ENDOSCOPY;  Service: Gastroenterology;  Laterality: N/A;  PRE- GI BLEED  POST- DIVERTICULOSIS, POLYP    ENDOSCOPY N/A 12/10/2022    Procedure: ESOPHAGOGASTRODUODENOSCOPY;  Surgeon: Albert Gallo MD;  Location: [facility] ENDOSCOPY;  Service:  "Gastroenterology;  Laterality: N/A;  PRE- DARK STOOLS  POST- BARRETTS ESOPHAGUS    FRACTURE SURGERY  1980    for broken arm    FRACTURE SURGERY      for broken hand    INCISION AND DRAINAGE ABSCESS  2015    anal    PROSTATE SURGERY      SMALL INTESTINE SURGERY      TONSILLECTOMY             ECG 12 Lead    Date/Time: 1/19/2024 1:32 PM  Performed by: Chinmay Taylor MD    Authorized by: Chinmay Taylor MD  Comparison: compared with previous ECG from 12/10/2023  Similar to previous ECG  Rhythm: sinus rhythm  Rate: normal  BPM: 59  Conduction: conduction normal  Q waves: III    ST Segments: ST segments normal  T Waves: T waves normal  QRS axis: normal  Other: no other findings    Clinical impression: non-specific ECG  Comments: Borderline Q wave noted in lead aVF, otherwise unremarkable EKG.  Similar to previous EKG.             Objective:     Vitals:    01/19/24 1254   BP: 118/76   Pulse: 67   SpO2: 96%   Weight: 107 kg (236 lb 12.8 oz)   Height: 188 cm (74.02\")         Constitutional:       Appearance: Healthy appearance. Not in distress.      Comments: Unstable gait.  Otherwise unremarkable exam.   Neck:      Vascular: JVD normal.   Pulmonary:      Effort: Pulmonary effort is normal.      Breath sounds: Normal breath sounds.   Cardiovascular:      PMI at left midclavicular line. Normal rate. Regular rhythm. Normal S2.       Murmurs: There is no murmur.   Pulses:     Intact distal pulses.   Edema:     Peripheral edema absent.   Skin:     General: Skin is warm and dry.   Neurological:      General: No focal deficit present.      Mental Status: Alert, oriented to person, place, and time and oriented to person, place and time.   Psychiatric:         Mood and Affect: Mood and affect normal.         Lab Review:     Lipid Panel          8/16/2023    14:58 8/20/2023    06:12 10/24/2023    06:37   Lipid Panel   Total Cholesterol 194  117  127    Triglycerides 194  115  88    HDL Cholesterol 41  34  39    VLDL Cholesterol 34  21 "  17    LDL Cholesterol  119  62  71    LDL/HDL Ratio 2.79  1.76  1.81      BUN   Date Value Ref Range Status   12/11/2023 11 6 - 20 mg/dL Final   08/27/2022 16 8 - 26 mg/dL Final     Creatinine   Date Value Ref Range Status   12/11/2023 0.98 0.76 - 1.27 mg/dL Final   10/23/2023 1.30 0.60 - 1.30 mg/dL Final     Comment:     Serial Number: [id number]:  [id number]   08/27/2022 0.96 0.73 - 1.18 mg/dL Final   09/28/2018 0.93 0.73 - 1.22 mg/dL Final     Potassium   Date Value Ref Range Status   12/11/2023 4.6 3.5 - 5.2 mmol/L Final     Comment:     Slight hemolysis detected by analyzer. Result may be falsely elevated.   08/27/2022 3.7 3.5 - 5.1 mmol/L Final     ALT (SGPT)   Date Value Ref Range Status   12/11/2023 56 (H) 1 - 41 U/L Final   08/27/2022 20 0 - 55 U/L Final     AST (SGOT)   Date Value Ref Range Status   12/11/2023 36 1 - 40 U/L Final   08/27/2022 20 5 - 34 U/L Final         Performed        Assessment:          Diagnosis Plan   1. Hypertension, unspecified type  ECG 12 Lead      2. Mixed hyperlipidemia  ECG 12 Lead      3. 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 Late effect of cerebrovascular accident (CVA)        5. Recurrent syncope  Case Request Cath Lab: Loop insertion      6. Labile hypertension               Plan:         Intermittent syncopal episodes: Poss related to labile hypertension.  Ports a prodrome of lightheadedness, symptoms with tunnel vision as well as numbness in his lips.  However, he would benefit from further monitoring to rule out arrhythmia as cause.  Management of blood pressure per below.  Proceed with Linq implant, also due to #3  Hypertension, labile: He has had some labile blood pressure shifts, but his blood pressure has been better controlled since he cut out alcohol.    Will stop carvedilol in favor of starting metoprolol to see if this reduces some of his orthostatic shifts.  Otherwise, he was  carefully titrated on a regimen in the hospital, little bit unusual but I would continue that regimen.  He is on spironolactone 25 daily, hydralazine 100 3 times daily, HCTZ 12.5 daily, lisinopril 40 daily,  Secondary workup obtained in the hospital was otherwise unremarkable.  24-hour catecholamines were negative  Possible KRIS.  He has sleep clinic evaluation next week  CVA, suspected embolic: He has had 2 different Holters which did not reveal any significant arrhythmia cause, however he is otherwise young.  He reports he cut out alcohol altogether.  Source of the stroke was never really seen.  Given his recurrent syncopal episodes, as well as a CVA, we will proceed with a Linq implant.  He has neurology appointment next month  Residual gait issues noted due to cerebellar CVA         RTC 3 months for blood pressure and symptom check-in.  Arranging for Linq implant in the interim.  Will trial metoprolol to see if that reduces some of his orthostatic symptoms.    Chinmay Taylor MD  [facility]  01/19/24  13:26 EST       Current Outpatient Medications:     acetaminophen (TYLENOL) 325 MG tablet, Take 2 tablets by mouth Every 6 (Six) Hours As Needed for Mild Pain., Disp: , Rfl:     amLODIPine (NORVASC) 5 MG tablet, Take 1 tablet by mouth Daily., Disp: , Rfl:     aspirin 81 MG EC tablet, Take 1 tablet by mouth Daily., Disp: 90 tablet, Rfl: 1    atorvastatin (LIPITOR) 80 MG tablet, Take 1 tablet by mouth Every Night., Disp: 30 tablet, Rfl: 0    folic acid (FOLVITE) 1 MG tablet, Take 1 tablet by mouth Daily., Disp: 90 tablet, Rfl: 0    hydrALAZINE (APRESOLINE) 100 MG tablet, Take 1 tablet by mouth Every 8 (Eight) Hours., Disp: 270 tablet, Rfl: 1    hydroCHLOROthiazide (HYDRODIURIL) 12.5 MG tablet, Take 1 tablet by mouth Daily., Disp: 30 tablet, Rfl: 1    hydrOXYzine (ATARAX) 25 MG tablet, Take 1 tablet by mouth 3 (Three) Times a Day As Needed for Anxiety., Disp: 90 tablet, Rfl: 0    lisinopril  (PRINIVIL,ZESTRIL) 40 MG tablet, Take 1 tablet by mouth Daily., Disp: 90 tablet, Rfl: 1    melatonin 5 MG tablet tablet, Take 1 tablet by mouth Every Night., Disp: 90 tablet, Rfl: 0    multivitamin (One-Daily Multi-Vitamin) tablet tablet, Take 1 tablet by mouth Daily., Disp: 90 tablet, Rfl: 0    pantoprazole (PROTONIX) 40 MG EC tablet, Take 1 tablet by mouth Every Morning., Disp: 30 tablet, Rfl: 1    sennosides-docusate (PERICOLACE) 8.6-50 MG per tablet, Take 2 tablets by mouth 2 (Two) Times a Day., Disp: 120 tablet, Rfl: 0    spironolactone (ALDACTONE) 25 MG tablet, Take 1 tablet by mouth Daily., Disp: 90 tablet, Rfl: 1    thiamine (VITAMIN B1) 100 MG tablet, Take 1 tablet by mouth Daily., Disp: 90 tablet, Rfl: 0    vilazodone (Viibryd) 40 MG tablet tablet, Take 1 tablet by mouth Daily., Disp: 90 tablet, Rfl: 1    metoprolol succinate XL (TOPROL-XL) 50 MG 24 hr tablet, Take 1 tablet by mouth Daily., Disp: 90 tablet, Rfl: 3         Return in about 3 months (around 4/19/2024).      Part of this note may be an electronic transcription/translation of spoken language to printed text using the Dragon Dictation System.

## 2024-01-24 ENCOUNTER — HOSPITAL ENCOUNTER (OUTPATIENT)
Facility: HOSPITAL | Age: 59
Setting detail: HOSPITAL OUTPATIENT SURGERY
Discharge: HOME | End: 2024-01-24
Attending: INTERNAL MEDICINE | Admitting: INTERNAL MEDICINE
Payer: MEDICAID

## 2024-01-24 VITALS
RESPIRATION RATE: 20 BRPM | BODY MASS INDEX: 29.52 KG/M2 | DIASTOLIC BLOOD PRESSURE: 95 MMHG | HEART RATE: 55 BPM | TEMPERATURE: 97.5 F | HEIGHT: 74 IN | WEIGHT: 230 LBS | OXYGEN SATURATION: 94 % | SYSTOLIC BLOOD PRESSURE: 144 MMHG

## 2024-01-24 DIAGNOSIS — R55 RECURRENT SYNCOPE: ICD-10-CM

## 2024-01-24 DIAGNOSIS — I63.442 CEREBROVASCULAR ACCIDENT (CVA) DUE TO EMBOLISM OF LEFT CEREBELLAR ARTERY: ICD-10-CM

## 2024-01-24 PROCEDURE — 25810000003 SODIUM CHLORIDE 0.9 % SOLUTION: Performed by: INTERNAL MEDICINE

## 2024-01-24 PROCEDURE — 25010000002 MIDAZOLAM PER 1 MG: Performed by: INTERNAL MEDICINE

## 2024-01-24 PROCEDURE — 25010000002 FENTANYL CITRATE (PF) 50 MCG/ML SOLUTION: Performed by: INTERNAL MEDICINE

## 2024-01-24 PROCEDURE — 33285 INSJ SUBQ CAR RHYTHM MNTR: CPT | Performed by: INTERNAL MEDICINE

## 2024-01-24 PROCEDURE — 25010000002 ONDANSETRON PER 1 MG: Performed by: INTERNAL MEDICINE

## 2024-01-24 PROCEDURE — C1764 EVENT RECORDER, CARDIAC: HCPCS | Performed by: INTERNAL MEDICINE

## 2024-01-24 DEVICE — SYS ICM LINQ2 W/MOBL/MONTR: Type: IMPLANTABLE DEVICE | Site: CHEST WALL | Status: FUNCTIONAL

## 2024-01-24 RX ORDER — ONDANSETRON 2 MG/ML
INJECTION INTRAMUSCULAR; INTRAVENOUS
Status: DISCONTINUED | OUTPATIENT
Start: 2024-01-24 | End: 2024-01-24 | Stop reason: HOSPADM

## 2024-01-24 RX ORDER — FENTANYL CITRATE 50 UG/ML
INJECTION, SOLUTION INTRAMUSCULAR; INTRAVENOUS
Status: DISCONTINUED | OUTPATIENT
Start: 2024-01-24 | End: 2024-01-24 | Stop reason: HOSPADM

## 2024-01-24 RX ORDER — SODIUM CHLORIDE 9 MG/ML
75 INJECTION, SOLUTION INTRAVENOUS CONTINUOUS
Status: DISCONTINUED | OUTPATIENT
Start: 2024-01-24 | End: 2024-01-24 | Stop reason: HOSPADM

## 2024-01-24 RX ORDER — MIDAZOLAM HYDROCHLORIDE 1 MG/ML
INJECTION INTRAMUSCULAR; INTRAVENOUS
Status: DISCONTINUED | OUTPATIENT
Start: 2024-01-24 | End: 2024-01-24 | Stop reason: HOSPADM

## 2024-01-24 RX ORDER — SODIUM CHLORIDE 0.9 % (FLUSH) 0.9 %
10 SYRINGE (ML) INJECTION EVERY 12 HOURS SCHEDULED
Status: DISCONTINUED | OUTPATIENT
Start: 2024-01-24 | End: 2024-01-24 | Stop reason: HOSPADM

## 2024-01-24 RX ADMIN — SODIUM CHLORIDE 75 ML/HR: 9 INJECTION, SOLUTION INTRAVENOUS at 09:38

## 2024-01-24 NOTE — Clinical Note
Prepped: left chest. Prepped with: Hibiclens. The site was clipped. The patient was draped in a sterile fashion.

## 2024-01-26 ENCOUNTER — TELEPHONE (OUTPATIENT)
Age: 59
End: 2024-01-26
Payer: MEDICAID

## 2024-01-26 ENCOUNTER — OFFICE VISIT (OUTPATIENT)
Dept: SLEEP MEDICINE | Facility: HOSPITAL | Age: 59
End: 2024-01-26
Payer: MEDICAID

## 2024-01-26 VITALS
HEIGHT: 74 IN | WEIGHT: 234 LBS | OXYGEN SATURATION: 95 % | SYSTOLIC BLOOD PRESSURE: 158 MMHG | BODY MASS INDEX: 30.03 KG/M2 | HEART RATE: 88 BPM | DIASTOLIC BLOOD PRESSURE: 122 MMHG

## 2024-01-26 DIAGNOSIS — G47.10 HYPERSOMNIA: Primary | ICD-10-CM

## 2024-01-26 DIAGNOSIS — I1A.0 RESISTANT HYPERTENSION: ICD-10-CM

## 2024-01-26 DIAGNOSIS — R06.81 WITNESSED EPISODE OF APNEA: ICD-10-CM

## 2024-01-26 DIAGNOSIS — G47.09 OTHER INSOMNIA: ICD-10-CM

## 2024-01-26 DIAGNOSIS — G47.9 SLEEP DISTURBANCE: ICD-10-CM

## 2024-01-26 DIAGNOSIS — I63.9 ACUTE CVA (CEREBROVASCULAR ACCIDENT): ICD-10-CM

## 2024-01-26 PROCEDURE — G0463 HOSPITAL OUTPT CLINIC VISIT: HCPCS

## 2024-01-26 RX ORDER — ZOLPIDEM TARTRATE 5 MG/1
5 TABLET ORAL NIGHTLY PRN
Qty: 1 TABLET | Refills: 0 | Status: SHIPPED | OUTPATIENT
Start: 2024-01-26

## 2024-01-26 NOTE — PROGRESS NOTES
[facility]  Telephone: [phone] / Fax: [phone] [place]  Telephone: [phone] / Fax: [phone] Tarah Domingo    Referring Physician: Akash Rodriguez Jr.,   PCP: Akash Rodriguez Jr.,     Reason for consult:  sleep apnea    Flako Coreas is a 58 y.o.male  was seen in the [facility] Disorders Center today for evaluation of sleep apnea. He was recently in the hospital for acute stroke. Embolic evaluation is  ongoing. He is seeing cardiology and pending a visit with neurology. KRIS evaluation recommended at [facility].  Since the stroke he has been sleeping longer and has not had consistent bedtime schedule. He continues to feel tired despite sleeping excessively. He has been dealing with ataxia.    He reports loud snoring and has history of DNS.  He wakes up choking/gasping for breath.    SH- history of alcohol abuse    ROS - +anxiety, hypersomnia    Flkao Coreas  has a past medical history of ADHD (attention deficit hyperactivity disorder), Anxiety, Lepe's esophagus, Benign prostatic hyperplasia (Surgery in 12/2021), Clotting disorder (Intestinal), Depression, Diverticulitis, Diverticulosis, Duodenitis, Enteritis, Failure to thrive (child), Family history of blood clots, GERD (gastroesophageal reflux disease), Hyperlipidemia, Hypertension, Hypertensive emergency, Low testosterone, Microcytosis, Pancreatitis, Pneumonia, PONV (postoperative nausea and vomiting), Seasonal affective disorder, Shoulder injury, Stroke, and Unexplained weight loss.    Current Medications:    Current Outpatient Medications:     acetaminophen (TYLENOL) 325 MG tablet, Take 2 tablets by mouth Every 6 (Six) Hours As Needed for Mild Pain., Disp: , Rfl:     amLODIPine (NORVASC) 5 MG tablet, Take 1 tablet by mouth Daily., Disp: , Rfl:     aspirin 81 MG EC tablet, Take 1 tablet by mouth Daily., Disp: 90 tablet, Rfl: 1    atorvastatin (LIPITOR) 80 MG tablet, Take 1 tablet by mouth Every Night.,  "Disp: 30 tablet, Rfl: 0    folic acid (FOLVITE) 1 MG tablet, Take 1 tablet by mouth Daily., Disp: 90 tablet, Rfl: 0    hydrALAZINE (APRESOLINE) 100 MG tablet, Take 1 tablet by mouth Every 8 (Eight) Hours., Disp: 270 tablet, Rfl: 1    hydroCHLOROthiazide (HYDRODIURIL) 12.5 MG tablet, Take 1 tablet by mouth Daily., Disp: 30 tablet, Rfl: 1    hydrOXYzine (ATARAX) 25 MG tablet, Take 1 tablet by mouth 3 (Three) Times a Day As Needed for Anxiety., Disp: 90 tablet, Rfl: 0    lisinopril (PRINIVIL,ZESTRIL) 40 MG tablet, Take 1 tablet by mouth Daily., Disp: 90 tablet, Rfl: 1    melatonin 5 MG tablet tablet, Take 1 tablet by mouth Every Night., Disp: 90 tablet, Rfl: 0    metoprolol succinate XL (TOPROL-XL) 50 MG 24 hr tablet, Take 1 tablet by mouth Daily., Disp: 90 tablet, Rfl: 3    multivitamin (One-Daily Multi-Vitamin) tablet tablet, Take 1 tablet by mouth Daily., Disp: 90 tablet, Rfl: 0    pantoprazole (PROTONIX) 40 MG EC tablet, Take 1 tablet by mouth Every Morning., Disp: 30 tablet, Rfl: 1    sennosides-docusate (PERICOLACE) 8.6-50 MG per tablet, Take 2 tablets by mouth 2 (Two) Times a Day., Disp: 120 tablet, Rfl: 0    spironolactone (ALDACTONE) 25 MG tablet, Take 1 tablet by mouth Daily., Disp: 90 tablet, Rfl: 1    thiamine (VITAMIN B1) 100 MG tablet, Take 1 tablet by mouth Daily., Disp: 90 tablet, Rfl: 0    vilazodone (Viibryd) 40 MG tablet tablet, Take 1 tablet by mouth Daily., Disp: 90 tablet, Rfl: 1    I have reviewed Past Medical History, Past Surgical History, Medication List, Social History and Family History as entered in Sleep Questionnaire and EPIC.    ESS  Not completed by pt   Vital Signs BP (!) 158/122   Pulse 88   Ht 188 cm (74\")   Wt 106 kg (234 lb)   SpO2 95%   BMI 30.04 kg/m²  Body mass index is 30.04 kg/m².    General Alert and oriented. No acute distress noted   Pharynx/Throat Class IV  Mallampati airway, large tongue, no evidence of redundant lateral pharyngeal tissue. No oral lesions. No thrush. " Moist mucous membranes.   Head Normocephalic. Symmetrical. Atraumatic.    Nose No septal deviation. No drainage   Chest Wall Normal shape. Symmetric expansion with respiration. No tenderness.   Neck Trachea midline, no thyromegaly or adenopathy    Lungs Clear to auscultation bilaterally. No wheezes. No rhonchi. No rales. Respirations regular, even and unlabored.   Heart Regular rhythm and normal rate. Normal S1 and S2. No murmur   Abdomen Soft, non-tender and non-distended. Normal bowel sounds. No masses.   Extremities Moves all extremities well. No edema   Psychiatric Normal mood and affect.        Impression:  1. Hypersomnia    2. Resistant hypertension    3. Acute CVA (cerebrovascular accident)    4. Other insomnia    5. Sleep disturbance    6. Witnessed episode of apnea          Plan:  I discussed the pathophysiology of obstructive sleep apnea with the patient.  I recommended in lab PSG in view of recent stroke. We discussed the adverse outcomes associated with untreated sleep-disordered breathing.  We discussed treatment modalities of obstructive sleep apnea including CPAP device.  Do study with 5mg Ambien to improve sleep efficiency in the lab.    Sleep study will be scheduled to establish a definitive diagnosis of sleep disorder breathing.  Weight loss will be strongly beneficial in order to reduce the severity of sleep-disordered breathing.  Patient has narrow oropharyngeal structure.      Caution during activities that require prolonged concentration is strongly advised.  After sleep study results are available, patient will be notified, and appointment will be scheduled to discuss sleep study results and treatment recommendations.    Rx for Ambien 5mg x 1 was provided for in lab polysomnogram. Patient was instructed to bring the Ambien tablet to the sleep lab and take at lights out . Strict instructions were given to NOT take the Ambien at home.    Instructions for the night sleep tech  It is permitted to  use zolpidem 5 mg prior to 1 AM.  If patient has Obstructive Sleep Apnea on diagnostic portion with AHI > 5 please do titration with CPAP and/or BIPAP per sleep disorder center policy.     I appreciate the opportunity to participate in this patient's care.      JANEEN Brown  [facility], [place]  Phone: [phone]      Part of this note may be an electronic transcription/translation of spoken language to printed text using the Dragon Dictation System. Some errors may exist even though the document was edited.

## 2024-01-26 NOTE — TELEPHONE ENCOUNTER
I spoke to the pt today regarding his new loop recorder. Went over incision care and home monitoring process.     Pt only complaint was his high blood pressure. He had just left the office after his sleep study appt and he stated that when they took his blood pressure it was 180/126. Pt stated that he feels okay, has just been tired. He did say he accidentally mixed up his blood pressure medications this morning.     Pt has a blood pressure machine at home and is going to take his blood pressure again later this evening to make sure it goes down. Informed him to send you a [employer] message with his blood pressure reading this evening.

## 2024-01-26 NOTE — TELEPHONE ENCOUNTER
Patient should keep a daily log of blood pressure, as well as a log of the medications he takes, and 1 hour after his morning medications he should take his blood pressure in a seated position after resting for 5 minutes.  He should do this for several days so we can have an accurate listing of his blood pressures.

## 2024-02-05 ENCOUNTER — TELEPHONE (OUTPATIENT)
Dept: CARDIOLOGY | Facility: CLINIC | Age: 59
End: 2024-02-05
Payer: MEDICAID

## 2024-02-05 RX ORDER — AMLODIPINE BESYLATE 5 MG/1
5 TABLET ORAL DAILY
Qty: 30 TABLET | Refills: 0 | Status: ON HOLD | OUTPATIENT
Start: 2024-02-05

## 2024-02-05 NOTE — TELEPHONE ENCOUNTER
Device was connected this AM    Patient activated symptom alert noted from 2/4/23 at 13:35.     ECG appears to be ST 100s-110s with PVCs and noise at the beginning of the strip. Where symptom marker is noted, ECG has some noise/artifact but still appears to be sinus with a rate between 80s-100s (possible BBB).       Beginning of ECG:           Symptom marker on ECG/end of ECG:           No other events have been recorded    Presenting ECG from 2/5/24:

## 2024-02-06 NOTE — TELEPHONE ENCOUNTER
I called and spoke with him and he verbalizes understanding.    Sherie Valdez RN  [facility]  02/06/24 09:34 EST

## 2024-02-12 ENCOUNTER — APPOINTMENT (OUTPATIENT)
Dept: CT IMAGING | Facility: HOSPITAL | Age: 59
End: 2024-02-12
Payer: MEDICAID

## 2024-02-12 ENCOUNTER — APPOINTMENT (OUTPATIENT)
Dept: OTHER | Facility: HOSPITAL | Age: 59
End: 2024-02-12
Payer: MEDICAID

## 2024-02-12 ENCOUNTER — APPOINTMENT (OUTPATIENT)
Dept: GENERAL RADIOLOGY | Facility: HOSPITAL | Age: 59
End: 2024-02-12
Payer: MEDICAID

## 2024-02-12 ENCOUNTER — HOSPITAL ENCOUNTER (OUTPATIENT)
Facility: HOSPITAL | Age: 59
Discharge: HOME | End: 2024-02-14
Attending: EMERGENCY MEDICINE | Admitting: INTERNAL MEDICINE
Payer: MEDICAID

## 2024-02-12 ENCOUNTER — APPOINTMENT (OUTPATIENT)
Dept: MRI IMAGING | Facility: HOSPITAL | Age: 59
End: 2024-02-12
Payer: MEDICAID

## 2024-02-12 DIAGNOSIS — R93.0 ABNORMAL CT OF THE HEAD: ICD-10-CM
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
## 2024-02-12 DIAGNOSIS — S39.012A BACK STRAIN, INITIAL ENCOUNTER: ICD-10-CM

## 2024-02-12 DIAGNOSIS — K22.70 BARRETT'S ESOPHAGUS WITHOUT DYSPLASIA: ICD-10-CM

## 2024-02-12 DIAGNOSIS — K21.00 GASTROESOPHAGEAL REFLUX DISEASE WITH ESOPHAGITIS WITHOUT HEMORRHAGE: ICD-10-CM

## 2024-02-12 DIAGNOSIS — Z09 FOLLOW-UP EXAM: ICD-10-CM

## 2024-02-12 DIAGNOSIS — K57.90 DIVERTICULOSIS: ICD-10-CM

## 2024-02-12 DIAGNOSIS — F41.1 GENERALIZED ANXIETY DISORDER: ICD-10-CM

## 2024-02-12 DIAGNOSIS — I63.442 CEREBROVASCULAR ACCIDENT (CVA) DUE TO EMBOLISM OF LEFT CEREBELLAR ARTERY: ICD-10-CM

## 2024-02-12 DIAGNOSIS — S09.90XA CLOSED HEAD INJURY, INITIAL ENCOUNTER: ICD-10-CM

## 2024-02-12 LAB
ALBUMIN SERPL-MCNC: 4.2 G/DL (ref 3.5–5.2)
ALBUMIN/GLOB SERPL: 1.6 G/DL
ALP SERPL-CCNC: 74 U/L (ref 39–117)
ALT SERPL W P-5'-P-CCNC: 24 U/L (ref 1–41)
ANION GAP SERPL CALCULATED.3IONS-SCNC: 13.3 MMOL/L (ref 5–15)
APTT PPP: 30 SECONDS (ref 22.7–35.4)
AST SERPL-CCNC: 16 U/L (ref 1–40)
BASOPHILS # BLD AUTO: 0.03 10*3/MM3 (ref 0–0.2)
BASOPHILS NFR BLD AUTO: 0.4 % (ref 0–1.5)
BILIRUB SERPL-MCNC: 0.5 MG/DL (ref 0–1.2)
BUN SERPL-MCNC: 21 MG/DL (ref 6–20)
BUN/CREAT SERPL: 19.1 (ref 7–25)
CALCIUM SPEC-SCNC: 8.7 MG/DL (ref 8.6–10.5)
CHLORIDE SERPL-SCNC: 104 MMOL/L (ref 98–107)
CHOLEST SERPL-MCNC: 167 MG/DL (ref 0–200)
CO2 SERPL-SCNC: 24.7 MMOL/L (ref 22–29)
CREAT SERPL-MCNC: 1.1 MG/DL (ref 0.76–1.27)
DEPRECATED RDW RBC AUTO: 37.8 FL (ref 37–54)
EGFRCR SERPLBLD CKD-EPI 2021: 78.8 ML/MIN/1.73
EOSINOPHIL # BLD AUTO: 0.51 10*3/MM3 (ref 0–0.4)
EOSINOPHIL NFR BLD AUTO: 6.4 % (ref 0.3–6.2)
ERYTHROCYTE [DISTWIDTH] IN BLOOD BY AUTOMATED COUNT: 13.1 % (ref 12.3–15.4)
ETHANOL BLD-MCNC: <10 MG/DL (ref 0–10)
ETHANOL UR QL: <0.01 %
GLOBULIN UR ELPH-MCNC: 2.6 GM/DL
GLUCOSE BLDC GLUCOMTR-MCNC: 119 MG/DL (ref 70–130)
GLUCOSE SERPL-MCNC: 95 MG/DL (ref 65–99)
HBA1C MFR BLD: 5.2 % (ref 4.8–5.6)
HCT VFR BLD AUTO: 38.4 % (ref 37.5–51)
HDLC SERPL-MCNC: 34 MG/DL (ref 40–60)
HGB BLD-MCNC: 12.3 G/DL (ref 13–17.7)
IMM GRANULOCYTES # BLD AUTO: 0.01 10*3/MM3 (ref 0–0.05)
IMM GRANULOCYTES NFR BLD AUTO: 0.1 % (ref 0–0.5)
INR PPP: 1 (ref 0.9–1.1)
LDLC SERPL CALC-MCNC: 96 MG/DL (ref 0–100)
LDLC/HDLC SERPL: 2.64 {RATIO}
LYMPHOCYTES # BLD AUTO: 1.96 10*3/MM3 (ref 0.7–3.1)
LYMPHOCYTES NFR BLD AUTO: 24.4 % (ref 19.6–45.3)
MCH RBC QN AUTO: 25.8 PG (ref 26.6–33)
MCHC RBC AUTO-ENTMCNC: 32 G/DL (ref 31.5–35.7)
MCV RBC AUTO: 80.7 FL (ref 79–97)
MONOCYTES # BLD AUTO: 0.73 10*3/MM3 (ref 0.1–0.9)
MONOCYTES NFR BLD AUTO: 9.1 % (ref 5–12)
NEUTROPHILS NFR BLD AUTO: 4.79 10*3/MM3 (ref 1.7–7)
NEUTROPHILS NFR BLD AUTO: 59.6 % (ref 42.7–76)
NRBC BLD AUTO-RTO: 0 /100 WBC (ref 0–0.2)
PLATELET # BLD AUTO: 248 10*3/MM3 (ref 140–450)
PMV BLD AUTO: 9.9 FL (ref 6–12)
POTASSIUM SERPL-SCNC: 3.9 MMOL/L (ref 3.5–5.2)
PROT SERPL-MCNC: 6.8 G/DL (ref 6–8.5)
PROTHROMBIN TIME: 13.3 SECONDS (ref 11.7–14.2)
RBC # BLD AUTO: 4.76 10*6/MM3 (ref 4.14–5.8)
SODIUM SERPL-SCNC: 142 MMOL/L (ref 136–145)
TRIGL SERPL-MCNC: 217 MG/DL (ref 0–150)
VLDLC SERPL-MCNC: 37 MG/DL (ref 5–40)
WBC NRBC COR # BLD AUTO: 8.03 10*3/MM3 (ref 3.4–10.8)

## 2024-02-12 PROCEDURE — 70450 CT HEAD/BRAIN W/O DYE: CPT

## 2024-02-12 PROCEDURE — 25010000002 KETOROLAC TROMETHAMINE PER 15 MG: Performed by: NURSE PRACTITIONER

## 2024-02-12 PROCEDURE — 72148 MRI LUMBAR SPINE W/O DYE: CPT

## 2024-02-12 PROCEDURE — 96374 THER/PROPH/DIAG INJ IV PUSH: CPT

## 2024-02-12 PROCEDURE — G0378 HOSPITAL OBSERVATION PER HR: HCPCS

## 2024-02-12 PROCEDURE — 82948 REAGENT STRIP/BLOOD GLUCOSE: CPT

## 2024-02-12 PROCEDURE — 70496 CT ANGIOGRAPHY HEAD: CPT

## 2024-02-12 PROCEDURE — 85610 PROTHROMBIN TIME: CPT | Performed by: NURSE PRACTITIONER

## 2024-02-12 PROCEDURE — 70498 CT ANGIOGRAPHY NECK: CPT

## 2024-02-12 PROCEDURE — 99285 EMERGENCY DEPT VISIT HI MDM: CPT

## 2024-02-12 PROCEDURE — 80061 LIPID PANEL: CPT | Performed by: NURSE PRACTITIONER

## 2024-02-12 PROCEDURE — 83036 HEMOGLOBIN GLYCOSYLATED A1C: CPT | Performed by: NURSE PRACTITIONER

## 2024-02-12 PROCEDURE — 25010000002 ONDANSETRON PER 1 MG: Performed by: NURSE PRACTITIONER

## 2024-02-12 PROCEDURE — 96375 TX/PRO/DX INJ NEW DRUG ADDON: CPT

## 2024-02-12 PROCEDURE — 25010000002 LORAZEPAM PER 2 MG: Performed by: NURSE PRACTITIONER

## 2024-02-12 PROCEDURE — 85025 COMPLETE CBC W/AUTO DIFF WBC: CPT | Performed by: PHYSICIAN ASSISTANT

## 2024-02-12 PROCEDURE — 96376 TX/PRO/DX INJ SAME DRUG ADON: CPT

## 2024-02-12 PROCEDURE — 82077 ASSAY SPEC XCP UR&BREATH IA: CPT | Performed by: NURSE PRACTITIONER

## 2024-02-12 PROCEDURE — 25010000002 DROPERIDOL PER 5 MG: Performed by: NURSE PRACTITIONER

## 2024-02-12 PROCEDURE — 63710000001 ONDANSETRON ODT 4 MG TABLET DISPERSIBLE: Performed by: EMERGENCY MEDICINE

## 2024-02-12 PROCEDURE — 72110 X-RAY EXAM L-2 SPINE 4/>VWS: CPT

## 2024-02-12 PROCEDURE — 70551 MRI BRAIN STEM W/O DYE: CPT

## 2024-02-12 PROCEDURE — 85730 THROMBOPLASTIN TIME PARTIAL: CPT | Performed by: NURSE PRACTITIONER

## 2024-02-12 PROCEDURE — 80053 COMPREHEN METABOLIC PANEL: CPT | Performed by: PHYSICIAN ASSISTANT

## 2024-02-12 PROCEDURE — 25510000001 IOPAMIDOL PER 1 ML: Performed by: EMERGENCY MEDICINE

## 2024-02-12 RX ORDER — SPIRONOLACTONE 25 MG/1
25 TABLET ORAL DAILY
Status: DISCONTINUED | OUTPATIENT
Start: 2024-02-13 | End: 2024-02-14 | Stop reason: HOSPADM

## 2024-02-12 RX ORDER — AMLODIPINE BESYLATE 5 MG/1
5 TABLET ORAL DAILY
Status: DISCONTINUED | OUTPATIENT
Start: 2024-02-13 | End: 2024-02-14 | Stop reason: HOSPADM

## 2024-02-12 RX ORDER — LISINOPRIL 20 MG/1
40 TABLET ORAL DAILY
Status: DISCONTINUED | OUTPATIENT
Start: 2024-02-13 | End: 2024-02-14 | Stop reason: HOSPADM

## 2024-02-12 RX ORDER — SPIRONOLACTONE 25 MG/1
25 TABLET ORAL DAILY
Status: DISCONTINUED | OUTPATIENT
Start: 2024-02-12 | End: 2024-02-12 | Stop reason: SDUPTHER

## 2024-02-12 RX ORDER — HYDROCHLOROTHIAZIDE 12.5 MG/1
12.5 TABLET ORAL DAILY
Status: DISCONTINUED | OUTPATIENT
Start: 2024-02-12 | End: 2024-02-12 | Stop reason: SDUPTHER

## 2024-02-12 RX ORDER — DROPERIDOL 2.5 MG/ML
1.25 INJECTION, SOLUTION INTRAMUSCULAR; INTRAVENOUS ONCE
Status: COMPLETED | OUTPATIENT
Start: 2024-02-12 | End: 2024-02-12

## 2024-02-12 RX ORDER — SODIUM CHLORIDE 0.9 % (FLUSH) 0.9 %
10 SYRINGE (ML) INJECTION AS NEEDED
Status: DISCONTINUED | OUTPATIENT
Start: 2024-02-12 | End: 2024-02-14 | Stop reason: HOSPADM

## 2024-02-12 RX ORDER — ASPIRIN 81 MG/1
81 TABLET ORAL DAILY
Status: DISCONTINUED | OUTPATIENT
Start: 2024-02-12 | End: 2024-02-14 | Stop reason: HOSPADM

## 2024-02-12 RX ORDER — ONDANSETRON 2 MG/ML
4 INJECTION INTRAMUSCULAR; INTRAVENOUS EVERY 6 HOURS PRN
Status: DISCONTINUED | OUTPATIENT
Start: 2024-02-12 | End: 2024-02-14 | Stop reason: HOSPADM

## 2024-02-12 RX ORDER — VILAZODONE HYDROCHLORIDE 40 MG/1
40 TABLET ORAL DAILY
Status: DISCONTINUED | OUTPATIENT
Start: 2024-02-12 | End: 2024-02-14 | Stop reason: HOSPADM

## 2024-02-12 RX ORDER — HYDRALAZINE HYDROCHLORIDE 50 MG/1
100 TABLET, FILM COATED ORAL EVERY 8 HOURS SCHEDULED
Status: DISCONTINUED | OUTPATIENT
Start: 2024-02-12 | End: 2024-02-12 | Stop reason: SDUPTHER

## 2024-02-12 RX ORDER — HYDRALAZINE HYDROCHLORIDE 50 MG/1
100 TABLET, FILM COATED ORAL EVERY 8 HOURS SCHEDULED
Status: DISCONTINUED | OUTPATIENT
Start: 2024-02-12 | End: 2024-02-14 | Stop reason: HOSPADM

## 2024-02-12 RX ORDER — AMLODIPINE BESYLATE 5 MG/1
5 TABLET ORAL
Status: DISCONTINUED | OUTPATIENT
Start: 2024-02-12 | End: 2024-02-12 | Stop reason: SDUPTHER

## 2024-02-12 RX ORDER — KETOROLAC TROMETHAMINE 15 MG/ML
15 INJECTION, SOLUTION INTRAMUSCULAR; INTRAVENOUS EVERY 6 HOURS PRN
Status: DISCONTINUED | OUTPATIENT
Start: 2024-02-12 | End: 2024-02-14

## 2024-02-12 RX ORDER — DIPHENOXYLATE HYDROCHLORIDE AND ATROPINE SULFATE 2.5; .025 MG/1; MG/1
1 TABLET ORAL DAILY
Status: DISCONTINUED | OUTPATIENT
Start: 2024-02-12 | End: 2024-02-14 | Stop reason: HOSPADM

## 2024-02-12 RX ORDER — ATORVASTATIN CALCIUM 80 MG/1
80 TABLET, FILM COATED ORAL NIGHTLY
Status: DISCONTINUED | OUTPATIENT
Start: 2024-02-12 | End: 2024-02-14 | Stop reason: HOSPADM

## 2024-02-12 RX ORDER — ONDANSETRON 4 MG/1
4 TABLET, ORALLY DISINTEGRATING ORAL ONCE
Status: COMPLETED | OUTPATIENT
Start: 2024-02-12 | End: 2024-02-12

## 2024-02-12 RX ORDER — ATORVASTATIN CALCIUM 20 MG/1
40 TABLET, FILM COATED ORAL NIGHTLY
Status: DISCONTINUED | OUTPATIENT
Start: 2024-02-12 | End: 2024-02-12 | Stop reason: SDUPTHER

## 2024-02-12 RX ORDER — CHOLECALCIFEROL (VITAMIN D3) 125 MCG
5 CAPSULE ORAL NIGHTLY
Status: DISCONTINUED | OUTPATIENT
Start: 2024-02-12 | End: 2024-02-14 | Stop reason: HOSPADM

## 2024-02-12 RX ORDER — PANTOPRAZOLE SODIUM 40 MG/1
40 TABLET, DELAYED RELEASE ORAL
Status: DISCONTINUED | OUTPATIENT
Start: 2024-02-13 | End: 2024-02-14 | Stop reason: HOSPADM

## 2024-02-12 RX ORDER — HYDROCHLOROTHIAZIDE 12.5 MG/1
12.5 TABLET ORAL DAILY
Status: DISCONTINUED | OUTPATIENT
Start: 2024-02-13 | End: 2024-02-14 | Stop reason: HOSPADM

## 2024-02-12 RX ORDER — METHOCARBAMOL 750 MG/1
750 TABLET, FILM COATED ORAL 4 TIMES DAILY
Status: DISCONTINUED | OUTPATIENT
Start: 2024-02-12 | End: 2024-02-14 | Stop reason: HOSPADM

## 2024-02-12 RX ORDER — OXYCODONE HYDROCHLORIDE AND ACETAMINOPHEN 5; 325 MG/1; MG/1
1 TABLET ORAL ONCE
Status: COMPLETED | OUTPATIENT
Start: 2024-02-12 | End: 2024-02-12

## 2024-02-12 RX ORDER — METOPROLOL SUCCINATE 50 MG/1
50 TABLET, EXTENDED RELEASE ORAL
Status: DISCONTINUED | OUTPATIENT
Start: 2024-02-12 | End: 2024-02-12 | Stop reason: SDUPTHER

## 2024-02-12 RX ORDER — LISINOPRIL 20 MG/1
40 TABLET ORAL
Status: DISCONTINUED | OUTPATIENT
Start: 2024-02-12 | End: 2024-02-12 | Stop reason: SDUPTHER

## 2024-02-12 RX ORDER — LORAZEPAM 2 MG/ML
1 INJECTION INTRAMUSCULAR ONCE AS NEEDED
Status: COMPLETED | OUTPATIENT
Start: 2024-02-12 | End: 2024-02-12

## 2024-02-12 RX ORDER — ASPIRIN 300 MG/1
300 SUPPOSITORY RECTAL DAILY
Status: DISCONTINUED | OUTPATIENT
Start: 2024-02-12 | End: 2024-02-12 | Stop reason: SDUPTHER

## 2024-02-12 RX ORDER — SODIUM CHLORIDE 0.9 % (FLUSH) 0.9 %
10 SYRINGE (ML) INJECTION EVERY 12 HOURS SCHEDULED
Status: DISCONTINUED | OUTPATIENT
Start: 2024-02-12 | End: 2024-02-14 | Stop reason: HOSPADM

## 2024-02-12 RX ORDER — ASPIRIN 325 MG
325 TABLET ORAL DAILY
Status: DISCONTINUED | OUTPATIENT
Start: 2024-02-12 | End: 2024-02-12 | Stop reason: SDUPTHER

## 2024-02-12 RX ORDER — FOLIC ACID 1 MG/1
1 TABLET ORAL DAILY
Status: DISCONTINUED | OUTPATIENT
Start: 2024-02-12 | End: 2024-02-14 | Stop reason: HOSPADM

## 2024-02-12 RX ORDER — LORAZEPAM 2 MG/ML
1 INJECTION INTRAMUSCULAR ONCE
Status: COMPLETED | OUTPATIENT
Start: 2024-02-12 | End: 2024-02-12

## 2024-02-12 RX ORDER — OXYCODONE HYDROCHLORIDE AND ACETAMINOPHEN 5; 325 MG/1; MG/1
1 TABLET ORAL EVERY 4 HOURS PRN
Status: DISCONTINUED | OUTPATIENT
Start: 2024-02-12 | End: 2024-02-14

## 2024-02-12 RX ORDER — METOPROLOL SUCCINATE 50 MG/1
50 TABLET, EXTENDED RELEASE ORAL DAILY
Status: DISCONTINUED | OUTPATIENT
Start: 2024-02-13 | End: 2024-02-14 | Stop reason: HOSPADM

## 2024-02-12 RX ORDER — SODIUM CHLORIDE 9 MG/ML
40 INJECTION, SOLUTION INTRAVENOUS AS NEEDED
Status: DISCONTINUED | OUTPATIENT
Start: 2024-02-12 | End: 2024-02-14 | Stop reason: HOSPADM

## 2024-02-12 RX ADMIN — Medication 10 ML: at 20:00

## 2024-02-12 RX ADMIN — ASPIRIN 325 MG: 325 TABLET ORAL at 09:31

## 2024-02-12 RX ADMIN — ATORVASTATIN CALCIUM 80 MG: 80 TABLET, FILM COATED ORAL at 20:00

## 2024-02-12 RX ADMIN — OXYCODONE HYDROCHLORIDE AND ACETAMINOPHEN 1 TABLET: 5; 325 TABLET ORAL at 16:44

## 2024-02-12 RX ADMIN — HYDRALAZINE HYDROCHLORIDE 100 MG: 50 TABLET ORAL at 15:59

## 2024-02-12 RX ADMIN — VILAZODONE HYDROCHLORIDE 40 MG: 40 TABLET, FILM COATED ORAL at 15:59

## 2024-02-12 RX ADMIN — Medication 100 MG: at 15:59

## 2024-02-12 RX ADMIN — FOLIC ACID 1 MG: 1 TABLET ORAL at 15:59

## 2024-02-12 RX ADMIN — Medication 1 TABLET: at 15:59

## 2024-02-12 RX ADMIN — IOPAMIDOL 95 ML: 755 INJECTION, SOLUTION INTRAVENOUS at 18:24

## 2024-02-12 RX ADMIN — DROPERIDOL 1.25 MG: 2.5 INJECTION, SOLUTION INTRAMUSCULAR; INTRAVENOUS at 22:51

## 2024-02-12 RX ADMIN — METHOCARBAMOL TABLETS 750 MG: 750 TABLET, COATED ORAL at 18:47

## 2024-02-12 RX ADMIN — LORAZEPAM 1 MG: 2 INJECTION INTRAMUSCULAR; INTRAVENOUS at 15:42

## 2024-02-12 RX ADMIN — Medication 5 MG: at 20:00

## 2024-02-12 RX ADMIN — OXYCODONE HYDROCHLORIDE AND ACETAMINOPHEN 1 TABLET: 5; 325 TABLET ORAL at 21:07

## 2024-02-12 RX ADMIN — OXYCODONE HYDROCHLORIDE AND ACETAMINOPHEN 1 TABLET: 5; 325 TABLET ORAL at 07:16

## 2024-02-12 RX ADMIN — ONDANSETRON 4 MG: 4 TABLET, ORALLY DISINTEGRATING ORAL at 07:16

## 2024-02-12 RX ADMIN — KETOROLAC TROMETHAMINE 15 MG: 15 INJECTION, SOLUTION INTRAMUSCULAR; INTRAVENOUS at 12:26

## 2024-02-12 RX ADMIN — Medication 10 ML: at 09:41

## 2024-02-12 RX ADMIN — ONDANSETRON 4 MG: 2 INJECTION INTRAMUSCULAR; INTRAVENOUS at 19:59

## 2024-02-12 RX ADMIN — HYDRALAZINE HYDROCHLORIDE 100 MG: 50 TABLET ORAL at 21:07

## 2024-02-12 RX ADMIN — LORAZEPAM 1 MG: 2 INJECTION INTRAMUSCULAR; INTRAVENOUS at 11:09

## 2024-02-12 RX ADMIN — METHOCARBAMOL TABLETS 750 MG: 750 TABLET, COATED ORAL at 20:00

## 2024-02-12 NOTE — SIGNIFICANT NOTE
Patient heard with raised voice, neurology DON at nurses station reports patient verbally inappropriate towards her.    At my bedside evaluation patient is verbally abusive and reports he is frustrated with not having an outpatient appointment at his prior hospitalization.    I did review his labs and imaging finding at this time and he reports that he is still having some back discomfort, Robaxin ordered.    Approximately 10 minutes after my discussion with patient advised I will escalate his prior hospitalization concerns with nurse management in an effort to ensure he has outpatient follow-up plan following his hospital discharge this time patient noted in the hallway and had fallen to his knees.  Physical exam unremarkable but limited, neurovascularly intact, screaming at staff to leave his room.    Awaiting physical therapy evaluation.

## 2024-02-12 NOTE — ED PROVIDER NOTES
MD ATTESTATION NOTE     SHARED VISIT: This visit was performed by BOTH a physician and an APC. The substantive portion of the medical decision making was performed by this attesting physician who made or approved the management plan and takes responsibility for patient management. All studies in the APC note (if performed) were independently interpreted by me.  The DON and I have discussed this patient's history, physical exam, and treatment plan. I have reviewed the documentation and personally had a face to face interaction with the patient. I affirm the documentation and agree with the treatment and plan. I provided a substantive portion of the care of the patient.  I personally performed the physical exam in its entirety, and below are my findings.      Brief HPI: Patient presents to the ED after a mechanical fall.  Patient slipped and fell down about 3 steps last night.  He hit the back of his head but denies loss of consciousness.  He complains of posterior headache and low back pain.  Back pain does not radiate.  Denies neck pain, chest pain, abdominal pain, extremity pain, or numbness/tingling/weakness in his legs.  He is on Brilinta.  Denies history of back surgery.    PHYSICAL EXAM  ED Triage Vitals   Temp Heart Rate Resp BP SpO2   02/12/24 0655 02/12/24 0655 02/12/24 0655 02/12/24 0704 02/12/24 0655   98.7 °F (37.1 °C) 59 18 120/74 97 %      Temp src Heart Rate Source Patient Position BP Location FiO2 (%)   02/12/24 0655 02/12/24 0655 02/12/24 0704 02/12/24 0704 --   Tympanic Monitor Lying Left arm          GENERAL: Awake, alert, oriented x 3.  Nontoxic-appearing male.  Resting comfortably in no acute distress  HENT: nares patent, there is a small tender hematoma on the posterior right scalp  EYES: no scleral icterus, EOMI  CV: regular rhythm, normal rate  RESPIRATORY: normal effort, clear to auscultation bilaterally  ABDOMEN: soft, nontender  MUSCULOSKELETAL: There is diffuse tenderness of the lumbar  spine.  C/T-spine, chest, and extremities are nontender.  There is full range of motion in all extremities  NEURO: Speech is clear and fluent.  No facial droop.  Normal strength and light touch sensation in all extremities.  GCS 15  PSYCH:  calm, cooperative  SKIN: warm, dry    Vital signs and nursing notes reviewed.        Plan: Obtain head CT and lumbar spine x-rays.  Patient will be given Percocet and Zofran.    Head CT personally interpreted by me.  My personal interpretation is: No intracranial hemorrhage.  No skull fracture.    MDM: Patient presented to the ED after mechanical fall.  He complained of headache and low back pain.  Head CT did not show any acute hemorrhage but showed a possible area of subacute infarct.  X-rays of the lumbar spine showed questionable compression fractures of L1 and L2.  Patient denies any new neurological deficits.  Neuroexam is nonfocal.  Case was discussed with neurology.  Patient will be admitted to the observation unit for further workup.    ED Course as of 02/12/24 1131   Mon Feb 12, 2024   0706 Patient presents with injuries sustained in a mechanical fall last night.  Patient complains of a headache, low back pain.  Patient is on Brilinta for history of stroke.  No focal deficits on exam.  Plan for CT imaging and x-rays of the lumbar spine. [EE]   0725 CT imaging of the brain independently interpreted myself shows no evidence of acute hemorrhage. [EE]   0922 Patient CT scan does show a questionable area of subacute infarct.  I discussed these findings with Dr. Murphy, stroke neurologist.  He would like us to order an MRI of the brain.    Patient's lumbar spine also has questionable compression fractures of L1 and L2.  We will order MRIs of the lumbar spine as well.    I discussed these findings with Ibis Luther, nurse practitioner in the observation unit.  She agrees to admit. [EE]   1115 Creatinine: 1.10 [EE]   1115 Hemoglobin(!): 12.3 [EE]      ED Course User  Index  [EE] Chinmay De Jesus, Akash Guajardo MD  02/12/24 5643

## 2024-02-12 NOTE — H&P
[facility]   HISTORY AND PHYSICAL    Patient Name: Flako Coreas  : 1965  MRN: [id number]  Primary Care Physician:  Akash Rodriguez Jr.,   Date of admission: 2024    Subjective   Subjective     Chief Complaint:   Chief Complaint   Patient presents with    Fall         HPI:    Flako Coreas is a pleasant afebrile ambulatory 58 y.o.  male with a past medical history of GERD, diverticulosis, hyperlipidemia, hypertension, and anxiety.     He presents to the emergency department at [facility] today with complaint of mechanical fall and subsequent headache and back discomfort.  He has been admitted to the ED observation unit for further testing and evaluation.      Patient states he was diagnosed with a stroke in 2023 and has had balance issues since.  He is not currently use walker or cane at home.  States he thinks he tripped last night and struck the back of his head.  He denies any loss of consciousness.  He endorses neck, low back discomfort since.  He denies any loss of bowel or bladder control.  He does endorse some bilateral blurry vision which she is attributing to hypertension.  States he has not had any of his blood pressure medications yet today.    Patient states that he is noted some blood in his stool over the last month.  He has a known history of diverticulitis and states his primary care providers who is setting him up for colonoscopy as an outpatient.  He denies any oral anticoagulation but states he is compliant with aspirin 81 mg daily.      Review of Systems   All systems were reviewed and negative except for: what is mentioned above    Personal History     Past Medical History:   Diagnosis Date    ADHD (attention deficit hyperactivity disorder)     On going issue    Anxiety     Lepe's esophagus     Benign prostatic hyperplasia Surgery in 2021    Clotting disorder Intestinal    Depression     Diverticulitis     Diverticulosis      Duodenitis     Enteritis     Failure to thrive (child)     Family history of blood clots     GERD (gastroesophageal reflux disease)     Hyperlipidemia     Hypertension     Hypertensive emergency     Low testosterone     Microcytosis     Pancreatitis     Pneumonia     PONV (postoperative nausea and vomiting)     Seasonal affective disorder     Shoulder injury     Stroke     Unexplained weight loss        Past Surgical History:   Procedure Laterality Date    CARDIAC ELECTROPHYSIOLOGY PROCEDURE N/A 1/24/2024    Procedure: Loop insertion- [employer] LINQ;  Surgeon: Kaela Walkre MD;  Location:  [facility];  Service: Cardiovascular;  Laterality: N/A;    COLON SURGERY      COLONOSCOPY      COLONOSCOPY N/A 12/11/2022    Procedure: COLONOSCOPY INTO CECUM WITH HOT SNARE POLYPECTOMY;  Surgeon: Albert Gallo MD;  Location:  [facility];  Service: Gastroenterology;  Laterality: N/A;  PRE- GI BLEED  POST- DIVERTICULOSIS, POLYP    ENDOSCOPY N/A 12/10/2022    Procedure: ESOPHAGOGASTRODUODENOSCOPY;  Surgeon: Albert Gallo MD;  Location:  [facility];  Service: Gastroenterology;  Laterality: N/A;  PRE- DARK STOOLS  POST- BARRETTS ESOPHAGUS    FRACTURE SURGERY Right 1980    for broken arm    FRACTURE SURGERY Right     for broken hand    INCISION AND DRAINAGE ABSCESS  2015    anal    PROSTATE SURGERY      SMALL INTESTINE SURGERY      TONSILLECTOMY         Family History: family history includes Stroke in his father and mother. Otherwise pertinent FHx was reviewed and not pertinent to current issue.    Social History:  reports that he has never smoked. He has never used smokeless tobacco. He reports that he does not currently use alcohol. He reports that he does not currently use drugs after having used the following drugs: Marijuana. Frequency: 1.00 time per week.    Home Medications:  acetaminophen, amLODIPine, aspirin, atorvastatin, folic acid, hydrALAZINE, hydrOXYzine, hydroCHLOROthiazide,  lisinopril, melatonin, metoprolol succinate XL, multivitamin, pantoprazole, sennosides-docusate, spironolactone, thiamine, vilazodone, and zolpidem    Allergies:  No Known Allergies    Objective   Objective     Vitals:   Temp:  [98.7 °F (37.1 °C)] 98.7 °F (37.1 °C)  Heart Rate:  [48-59] 48  Resp:  [18] 18  BP: (120-154)/() 154/102  Physical Exam    Constitutional: Awake, alert   Eyes: PERRLA, sclerae anicteric, no conjunctival injection   HENT: NCAT, mucous membranes moist   Neck: Supple, no thyromegaly, no lymphadenopathy, trachea midline   Respiratory: Clear to auscultation bilaterally, nonlabored respirations    Cardiovascular: RRR, no murmurs, rubs, or gallops, palpable pedal pulses bilaterally   Gastrointestinal: Positive bowel sounds, soft, nontender, nondistended   Musculoskeletal: No bilateral ankle edema, no clubbing or cyanosis to extremities   Psychiatric: Flat affect, cooperative   Neurologic: Oriented x 3, strength symmetric in all extremities, Cranial Nerves grossly intact to confrontation, speech clear   Skin: No rashes     Result Review    Result Review:  I have personally reviewed the results from the time of this admission to 2/12/2024 11:42 EST and agree with these findings:  [x]  Laboratory list / accordion  []  Microbiology  [x]  Radiology  []  EKG/Telemetry   []  Cardiology/Vascular   []  Pathology  []  Old records  []  Other:  Most notable findings include: Hemoglobin 12.3, CT head without contrast shows subtle area low-density anterior right aspect of the cuco not seen on prior study 12/10/2023 could be subacute to chronic infarct      Assessment & Plan   Assessment / Plan     Brief Patient Summary:  Flako Coreas is a 58 y.o. male who is being evaluated for dizziness and headache status post fall last evening.    Active Hospital Problems:  Active Hospital Problems    Diagnosis     **Closed head injury      Plan:     Dizziness  Headache  History of CVA  MRI brain and lumbar spine  without contrast pending  Fall precautions  Neurochecks every 4 hours  Consider neurosurgical consult pending MRI of lumbar spine    Hypertension  Vitals every 4 hours  Continue home medications    DVT prophylaxis:  Mechanical DVT prophylaxis orders are present.        CODE STATUS:    Level Of Support Discussed With: Patient  Code Status (Patient has no pulse and is not breathing): CPR (Attempt to Resuscitate)  Medical Interventions (Patient has pulse or is breathing): Full Support    Admission Status:  I believe this patient meets observation status.    Electronically signed by JANEEN Perea, 02/12/24, 9:59 AM EST.        75 minutes has been spent by [facility] providers in the care of this patient while under observation status      I have worn appropriate PPE during this patient encounter, sanitized my hands both with entering and exiting patient's room.

## 2024-02-12 NOTE — ED PROVIDER NOTES
EMERGENCY DEPARTMENT ENCOUNTER    Room Number:  03/03  Date of encounter:  2/12/2024  PCP: Akash Rodriguez Jr., DO  Historian: Patient  Chronic or social conditions impacting care (social determinants of health): None    HPI:  Chief Complaint: Fall  A complete HPI/ROS/PMH/PSH/SH/FH are unobtainable due to: Nothing    Context: Flako Coreas is a 58 y.o. male with a history of stroke, hypertension.  He presents to the ED c/o acute head injury, low back pain after mechanical fall last night at midnight.  Patient states that he tripped over his legs going down the steps.  He reportedly fell forward down 4-5 steps.  He does have chronic intermittent ataxia from his stroke.  He hit his occipital scalp however denies any loss of consciousness.  He is on Brilinta for history of stroke.  He does complain of achy low back pain.  He denies any radiation of pain down his legs.  Denies any weakness, numbness of his legs.  Patient does have a history of stroke and recurrent syncope however denies any syncope last night.    Review of prior external notes (non-ED):   I reviewed cardiology office visit from 1/19/2024.  Patient being followed for hypertension, recurrent syncope.    Review of prior external test results outside of this encounter:  I reviewed a CMP from 12/11/2023.  Creatinine 0.98, potassium 4.6.    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  10/23/2023    Acute CVA (cerebrovascular accident) 10/23/2023    HTN (hypertension) 10/24/2023    Late effect of cerebrovascular accident (CVA) 10/25/2023    Concussion 10/27/2023    Fall 10/27/2023    Head injury, closed, with concussion 10/27/2023    Alcohol abuse 10/28/2023    CHI (closed head injury), initial encounter 12/10/2023    Recurrent syncope 01/19/2024     Resolved Ambulatory Problems     Diagnosis Date Noted    Essential hypertension 06/26/2017    Abdominal pain 06/20/2018    Generalized abdominal pain 12/08/2022    Gastrointestinal hemorrhage 12/08/2022    Acute CVA (cerebrovascular accident) 08/17/2023    TIA (transient ischemic attack) 10/26/2023     Past Medical History:   Diagnosis Date    ADHD (attention deficit hyperactivity disorder)     Anxiety     Lepe's esophagus     Benign prostatic hyperplasia Surgery in 12/2021    Clotting disorder Intestinal    Depression     Diverticulitis     Duodenitis     Enteritis     Failure to thrive (child)     Family history of blood clots     Hyperlipidemia     Hypertension     Low testosterone     Microcytosis     Pancreatitis     Pneumonia     PONV (postoperative nausea and vomiting)     Seasonal affective disorder     Shoulder injury     Stroke     Unexplained weight loss          PAST SURGICAL HISTORY  Past Surgical History:   Procedure Laterality Date    CARDIAC ELECTROPHYSIOLOGY PROCEDURE N/A 1/24/2024    Procedure: Loop insertion- [employer] LINQ;  Surgeon: Kaela Walker MD;  Location: [facility] CATH INVASIVE LOCATION;  Service: Cardiovascular;  Laterality: N/A;    COLON SURGERY      COLONOSCOPY      COLONOSCOPY N/A 12/11/2022    Procedure: COLONOSCOPY INTO CECUM WITH HOT SNARE POLYPECTOMY;  Surgeon: Albert Gallo MD;  Location: [facility] ENDOSCOPY;  Service: Gastroenterology;  Laterality: N/A;  PRE- GI BLEED  POST- DIVERTICULOSIS, POLYP    ENDOSCOPY N/A 12/10/2022    Procedure: ESOPHAGOGASTRODUODENOSCOPY;  Surgeon: Albert Gallo MD;  Location: [facility]  ENDOSCOPY;  Service: Gastroenterology;  Laterality: N/A;  PRE- DARK STOOLS  POST- BARRETTS ESOPHAGUS    FRACTURE SURGERY Right 1980    for broken arm    FRACTURE SURGERY Right     for broken hand    INCISION AND DRAINAGE ABSCESS  2015    anal    PROSTATE SURGERY      SMALL INTESTINE SURGERY      TONSILLECTOMY           FAMILY HISTORY  Family History   Problem Relation Age of Onset    Stroke Mother     Stroke Father          SOCIAL HISTORY  Social History     Socioeconomic History    Marital status: Single   Tobacco Use    Smoking status: Never    Smokeless tobacco: Never   Vaping Use    Vaping Use: Never used   Substance and Sexual Activity    Alcohol use: Not Currently     Comment: 12 beers on the weekend    Drug use: Not Currently     Frequency: 1.0 times per week     Types: Marijuana     Comment: patient states he has been smoking marijuania daily x 2 weeks    Sexual activity: Defer     Partners: Female         ALLERGIES  Patient has no known allergies.        REVIEW OF SYSTEMS  All systems reviewed and negative except for those discussed in HPI.       PHYSICAL EXAM    I have reviewed the triage vital signs and nursing notes.    ED Triage Vitals   Temp Heart Rate Resp BP SpO2   02/12/24 0655 02/12/24 0655 02/12/24 0655 02/12/24 0704 02/12/24 0655   98.7 °F (37.1 °C) 59 18 120/74 97 %      Temp src Heart Rate Source Patient Position BP Location FiO2 (%)   02/12/24 0655 02/12/24 0655 02/12/24 0704 02/12/24 0704 --   Tympanic Monitor Lying Left arm        Physical Exam  GENERAL: Alert, oriented, well-appearing, not distressed  HENT: Tenderness to the right occipital scalp without significant hematoma.  No cervical tenderness.  EYES: no scleral icterus, EOMI  CV: regular rhythm, bradycardic rate, no murmur  RESPIRATORY: normal effort, CTA  ABDOMEN: soft, nontender  MUSCULOSKELETAL: no deformity, FROM, no calf swelling or tenderness  NEURO: alert, moves all extremities,  normal speech, normal cerebellar  function  SKIN: warm, dry        LAB RESULTS  Recent Results (from the past 24 hour(s))   Comprehensive Metabolic Panel    Collection Time: 02/12/24  9:37 AM    Specimen: Blood   Result Value Ref Range    Glucose 95 65 - 99 mg/dL    BUN 21 (H) 6 - 20 mg/dL    Creatinine 1.10 0.76 - 1.27 mg/dL    Sodium 142 136 - 145 mmol/L    Potassium 3.9 3.5 - 5.2 mmol/L    Chloride 104 98 - 107 mmol/L    CO2 24.7 22.0 - 29.0 mmol/L    Calcium 8.7 8.6 - 10.5 mg/dL    Total Protein 6.8 6.0 - 8.5 g/dL    Albumin 4.2 3.5 - 5.2 g/dL    ALT (SGPT) 24 1 - 41 U/L    AST (SGOT) 16 1 - 40 U/L    Alkaline Phosphatase 74 39 - 117 U/L    Total Bilirubin 0.5 0.0 - 1.2 mg/dL    Globulin 2.6 gm/dL    A/G Ratio 1.6 g/dL    BUN/Creatinine Ratio 19.1 7.0 - 25.0    Anion Gap 13.3 5.0 - 15.0 mmol/L    eGFR 78.8 >60.0 mL/min/1.73   CBC Auto Differential    Collection Time: 02/12/24  9:37 AM    Specimen: Blood   Result Value Ref Range    WBC 8.03 3.40 - 10.80 10*3/mm3    RBC 4.76 4.14 - 5.80 10*6/mm3    Hemoglobin 12.3 (L) 13.0 - 17.7 g/dL    Hematocrit 38.4 37.5 - 51.0 %    MCV 80.7 79.0 - 97.0 fL    MCH 25.8 (L) 26.6 - 33.0 pg    MCHC 32.0 31.5 - 35.7 g/dL    RDW 13.1 12.3 - 15.4 %    RDW-SD 37.8 37.0 - 54.0 fl    MPV 9.9 6.0 - 12.0 fL    Platelets 248 140 - 450 10*3/mm3    Neutrophil % 59.6 42.7 - 76.0 %    Lymphocyte % 24.4 19.6 - 45.3 %    Monocyte % 9.1 5.0 - 12.0 %    Eosinophil % 6.4 (H) 0.3 - 6.2 %    Basophil % 0.4 0.0 - 1.5 %    Immature Grans % 0.1 0.0 - 0.5 %    Neutrophils, Absolute 4.79 1.70 - 7.00 10*3/mm3    Lymphocytes, Absolute 1.96 0.70 - 3.10 10*3/mm3    Monocytes, Absolute 0.73 0.10 - 0.90 10*3/mm3    Eosinophils, Absolute 0.51 (H) 0.00 - 0.40 10*3/mm3    Basophils, Absolute 0.03 0.00 - 0.20 10*3/mm3    Immature Grans, Absolute 0.01 0.00 - 0.05 10*3/mm3    nRBC 0.0 0.0 - 0.2 /100 WBC   Hemoglobin A1c    Collection Time: 02/12/24  9:37 AM    Specimen: Blood   Result Value Ref Range    Hemoglobin A1C 5.20 4.80 - 5.60 %   Lipid Panel     Collection Time: 02/12/24  9:37 AM    Specimen: Blood   Result Value Ref Range    Total Cholesterol 167 0 - 200 mg/dL    Triglycerides 217 (H) 0 - 150 mg/dL    HDL Cholesterol 34 (L) 40 - 60 mg/dL    LDL Cholesterol  96 0 - 100 mg/dL    VLDL Cholesterol 37 5 - 40 mg/dL    LDL/HDL Ratio 2.64    aPTT    Collection Time: 02/12/24  9:37 AM    Specimen: Blood   Result Value Ref Range    PTT 30.0 22.7 - 35.4 seconds   Protime-INR    Collection Time: 02/12/24  9:37 AM    Specimen: Blood   Result Value Ref Range    Protime 13.3 11.7 - 14.2 Seconds    INR 1.00 0.90 - 1.10       Ordered the above labs and independently reviewed the results.        RADIOLOGY  XR Spine Lumbar Complete 4+VW    Result Date: 2/12/2024  Lumbar spine radiograph  HISTORY: Pain, fall  TECHNIQUE: AP, lateral, oblique and spot views of the lumbar spine  COMPARISON: None      FINDINGS AND IMPRESSION: For the purpose of this dictation, last well-formed disc base to be referred to as L5-S1.  Erycette impression deformities of the L1 and T12 vertebral bodies of indeterminate age. Given patient history of back pain and acute injury, further evaluation with MRI is recommended to exclude acute fracture. There is mild retrolisthesis of L1 and L2. Multilevel mild to moderate degenerative changes are present, most notably within the lower lumbar spine, and attention above-mentioned MRI is recommended to better characterize.  This report was finalized on 2/12/2024 8:56 AM by Dr. Jameson Hoffman M.D on Workstation: [email or web address]      CT Head Without Contrast    Result Date: 2/12/2024  CT OF THE BRAIN WITHOUT CONTRAST 02/12/2024  HISTORY: Fell. Head injury.  Axial images were obtained through the brain without intravenous contrast.  There is a subtle area of low density in the anterior rightward aspect of the cuco such as on axial images 19 and 20. This is not clearly seen on the previous study of 12/10/2023. This could represent subtle area of subacute to chronic  infarct but could also be artifact. Brain parenchyma and ventricular system otherwise appear within normal limits. No mass lesions, midline shift, intracranial hemorrhage or evidence of infarction is demonstrated. There is some distal internal carotid and distal vertebral artery calcification.  No bony abnormalities are seen.      1. Subtle area of low density in the anterior rightward aspect of the cuco as described. This is not seen on the previous study of 12/10/2023. While this could be artifact small area of subacute to chronic infarct is not entirely excluded. If further evaluation of this area is clinically indicated, followup MRI. 2. No acute posttraumatic abnormality is seen.  COMMENT: Reviewed report.]  Radiation dose reduction techniques were utilized, including automated exposure control and exposure modulation based on body size.        I ordered the above noted radiological studies. Reviewed by me and discussed with radiologist.  See dictation for official radiology interpretation.      MEDICATIONS GIVEN IN ER    Medications   sodium chloride 0.9 % flush 10 mL (has no administration in time range)   sodium chloride 0.9 % flush 10 mL (10 mL Intravenous Given 2/12/24 0941)   sodium chloride 0.9 % flush 10 mL (has no administration in time range)   sodium chloride 0.9 % infusion 40 mL (has no administration in time range)   atorvastatin (LIPITOR) tablet 40 mg (has no administration in time range)   ondansetron (ZOFRAN) injection 4 mg (has no administration in time range)   aspirin tablet 325 mg (325 mg Oral Given 2/12/24 0931)     Or   aspirin suppository 300 mg ( Rectal Not Given:  See Alt 2/12/24 0931)   amLODIPine (NORVASC) tablet 5 mg (has no administration in time range)   hydroCHLOROthiazide tablet 12.5 mg (has no administration in time range)   lisinopril (PRINIVIL,ZESTRIL) tablet 40 mg (has no administration in time range)   hydrALAZINE (APRESOLINE) tablet 100 mg (has no administration in time  range)   metoprolol succinate XL (TOPROL-XL) 24 hr tablet 50 mg (has no administration in time range)   spironolactone (ALDACTONE) tablet 25 mg (has no administration in time range)   oxyCODONE-acetaminophen (PERCOCET) 5-325 MG per tablet 1 tablet (1 tablet Oral Given 2/12/24 0716)   ondansetron ODT (ZOFRAN-ODT) disintegrating tablet 4 mg (4 mg Oral Given 2/12/24 0716)   LORazepam (ATIVAN) injection 1 mg (1 mg Intravenous Given 2/12/24 1109)         ADDITIONAL ORDERS CONSIDERED BUT NOT ORDERED:  Nothing      PROGRESS, DATA ANALYSIS, CONSULTS, AND MEDICAL DECISION MAKING    All labs have been independently interpreted by myself.  All radiology studies have been independently interpreted by myself and discussed with radiologist dictating the report.   EKG's independently interpreted by myself.  Discussion below represents my analysis of pertinent findings related to patient's condition, differential diagnosis, treatment plan and final disposition.    I have discussed case with Dr. Webb, emergency room physician.  He has performed his own bedside examination and agrees with treatment plan.    ED Course as of 02/12/24 1115   Mon Feb 12, 2024   0706 Patient presents with injuries sustained in a mechanical fall last night.  Patient complains of a headache, low back pain.  Patient is on Brilinta for history of stroke.  No focal deficits on exam.  Plan for CT imaging and x-rays of the lumbar spine. [EE]   0725 CT imaging of the brain independently interpreted myself shows no evidence of acute hemorrhage. [EE]   0922 Patient CT scan does show a questionable area of subacute infarct.  I discussed these findings with Dr. Murphy, stroke neurologist.  He would like us to order an MRI of the brain.    Patient's lumbar spine also has questionable compression fractures of L1 and L2.  We will order MRIs of the lumbar spine as well.    I discussed these findings with Ibis Luther, nurse practitioner in the observation unit.  She  agrees to admit. [EE]   1115 Creatinine: 1.10 [EE]   1115 Hemoglobin(!): 12.3 [EE]      ED Course User Index  [EE] Chinmay De Jesus PA       AS OF 11:15 EST VITALS:    BP - (!) 154/102  HR - (!) 48  TEMP - 98.7 °F (37.1 °C) (Tympanic)  O2 SATS - 95%        DIAGNOSIS  Final diagnoses:   Fall, initial encounter   Closed head injury, initial encounter   Abnormal CT of the head   Back strain, initial encounter         DISPOSITION  Admitted    Dictated utilizing Dragon dictation     Chinmay De Jesus PA  02/12/24 1116

## 2024-02-12 NOTE — PROGRESS NOTES
Pt admitted to the observation unit from the emergency department after he suffered a head injury and low back pain after a mechanical fall last night at midnight.  Tripped going down the steps.  History of prior stroke with intermittent ataxia.  He is on Brilinta.  Complain of underlies low back pain.  Denies any numbness or weakness of lower extremities, no urinary or fecal incontinence or retention.      On exam,   General: No acute distress, nontoxic  HEENT: Mucous membranes moist, atraumatic, EOMI  Neck: Full ROM  Pulm: Symmetric chest rise, nonlabored, lungs CTAB  Cardiovascular: Regular rate and rhythm, intact distal pulses  GI: Soft, nontender, nondistended, no rebound, no guarding, bowel sounds present  MSK: Full ROM, no deformity, no significant tenderness to lower back or gluteal regions to palpation  Skin: Warm, dry  Neuro: Awake, alert, oriented x 4, GCS 15, no dysarthria or aphasia, moving all extremities, no focal deficits  Psych: Calm, cooperative          Plan: CT head showing a subtle area of low density in the anterior right aspect of the cuco not seen on prior study in December 2023, could indicate subacute to chronic infarct, MRI recommended.  Lumbar spine x-ray showing abnormalities in L1 and T12 with MRI recommended.  Given patient's fall and symptoms and history of CVA, plan for observation stay for MRI head and lumbar spine.  Neurology has been consulted.  Labs are reassuring.  For symptomatic control, MRI scans, and specialty consultations.             MD Attestation Note    SHARED VISIT: This visit was performed by BOTH a physician and an APC. The substantive portion of the medical decision making was performed by this attesting physician who made or approved the management plan and takes responsibility for patient management. All studies in the APC note (if performed) were independently interpreted by me.

## 2024-02-12 NOTE — PROGRESS NOTES
Clinical Pharmacy Services: Medication History    Flako Coreas is a 58 y.o. male presenting to [facility] for   Chief Complaint   Patient presents with    Fall       He  has a past medical history of ADHD (attention deficit hyperactivity disorder), Anxiety, Lepe's esophagus, Benign prostatic hyperplasia (Surgery in 12/2021), Clotting disorder (Intestinal), Depression, Diverticulitis, Diverticulosis, Duodenitis, Enteritis, Failure to thrive (child), Family history of blood clots, GERD (gastroesophageal reflux disease), Hyperlipidemia, Hypertension, Hypertensive emergency, Low testosterone, Microcytosis, Pancreatitis, Pneumonia, PONV (postoperative nausea and vomiting), Seasonal affective disorder, Shoulder injury, Stroke, and Unexplained weight loss.    Allergies as of 02/12/2024    (No Known Allergies)       Medication information was obtained from: Patient   Pharmacy and Phone Number:     Prior to Admission Medications       Prescriptions Last Dose Informant Patient Reported? Taking?    amLODIPine (NORVASC) 5 MG tablet 2/11/2024 Self No Yes    Take 1 tablet by mouth Daily.    aspirin 81 MG EC tablet 2/11/2024 Self No Yes    Take 1 tablet by mouth Daily.    atorvastatin (LIPITOR) 80 MG tablet 2/11/2024 Self No Yes    Take 1 tablet by mouth Every Night.    folic acid (FOLVITE) 1 MG tablet 2/11/2024 Self No Yes    Take 1 tablet by mouth Daily.    hydrALAZINE (APRESOLINE) 100 MG tablet 2/11/2024 Self No Yes    Take 1 tablet by mouth Every 8 (Eight) Hours.    hydroCHLOROthiazide (HYDRODIURIL) 12.5 MG tablet 2/11/2024 Self No Yes    Take 1 tablet by mouth Daily.    lisinopril (PRINIVIL,ZESTRIL) 40 MG tablet 2/11/2024 Self No Yes    Take 1 tablet by mouth Daily.    melatonin 5 MG tablet tablet Past Week Self No Yes    Take 1 tablet by mouth Every Night.    metoprolol succinate XL (TOPROL-XL) 50 MG 24 hr tablet 2/11/2024 Self No Yes    Take 1 tablet by mouth Daily.    multivitamin (One-Daily  Multi-Vitamin) tablet tablet 2/11/2024 Self No Yes    Take 1 tablet by mouth Daily.    pantoprazole (PROTONIX) 40 MG EC tablet 2/11/2024 Pharmacy No Yes    Take 1 tablet by mouth Every Morning.    sennosides-docusate (PERICOLACE) 8.6-50 MG per tablet Past Week Self No Yes    Take 2 tablets by mouth 2 (Two) Times a Day.    spironolactone (ALDACTONE) 25 MG tablet 2/11/2024 Self No Yes    Take 1 tablet by mouth Daily.    thiamine (VITAMIN B1) 100 MG tablet   No Yes    Take 1 tablet by mouth Daily.    vilazodone (Viibryd) 40 MG tablet tablet 2/11/2024 Self No Yes    Take 1 tablet by mouth Daily.    acetaminophen (TYLENOL) 325 MG tablet More than a month Self No No    Take 2 tablets by mouth Every 6 (Six) Hours As Needed for Mild Pain.    hydrOXYzine (ATARAX) 25 MG tablet   No No    Take 1 tablet by mouth 3 (Three) Times a Day As Needed for Anxiety.    zolpidem (Ambien) 5 MG tablet   No No    Take 1 tablet by mouth At Night As Needed for Sleep (Bring to the sleep lab, do not take at home.).              Medication notes:     This medication list is complete to the best of my knowledge as of 2/12/2024    Please call if questions.    Tree Mckinley  Medication History Technician  [phone]    2/12/2024 10:00 EST

## 2024-02-12 NOTE — Clinical Note
[facility] EMERGENCY DEPARTMENT  [address] [facility] [address]  Phone: [phone]    Flako Coreas was seen and treated in our emergency department on 2/12/2024.  He may return to work on 02/12/2024.  Flako Coreas Jr. accompanied his father, Flako Coreas, to the ER today. Please excuse his absence from work today.        Thank you for choosing [facility].    Akash Webb MD

## 2024-02-12 NOTE — PLAN OF CARE
Goal Outcome Evaluation: Admitted today after falling at home.  Swallow study passed.  NIH is a 0.  Patient reported no recent alcohol drinking but has history of alcohol abuse so CIWA protocol was ordered.  Patient was calm and pleasant on admission but became increasingly agitated throughout the shift.  Around 1600, patient jumped out of the bed and began banging on his door while shouting that he wanted to see his doctor.  The room door gave way and the patient fell to the ground in the hallway.  Myself and the APRN helped the patient off the floor and back to his bed.  Bed alarm placed.  Neurology ended up calling the patient on his phone and detailed his plan of care going forward.  CTA of the head and neck performed and neuro will re-evaluate in the morning.

## 2024-02-12 NOTE — CASE MANAGEMENT/SOCIAL WORK
"Discharge Planning Assessment  [facility]     Patient Name: Flako Coreas  MRN: [id number]  Today's Date: 2/12/2024    Admit Date: 2/12/2024    Plan: Plans to return home at d/c-[facility]   Discharge Needs Assessment    No documentation.                  Discharge Plan       Row Name 02/12/24 1524       Plan    Plan Plans to return home at d/c-[facility]    Plan Comments Spoke w/ pt at bedside w/ his permission. Stated that he was \"very upset, wants eval by neurologist\"-PA W/ Neurology updated OBS provider-and pt stated that he needed time to calm down and wanted ne to come back at another time for d/c needs assessment. Stated that he plans to return home at d/c-[facility]                  Continued Care and Services - Admitted Since 2/12/2024    Coordination has not been started for this encounter.          Demographic Summary    No documentation.                  Functional Status    No documentation.                  Psychosocial    No documentation.                  Abuse/Neglect    No documentation.                  Legal    No documentation.                  Substance Abuse    No documentation.                  Patient Forms    No documentation.                     Henny Castillo RN    "

## 2024-02-13 LAB
ALBUMIN SERPL-MCNC: 4.1 G/DL (ref 3.5–5.2)
ALBUMIN/GLOB SERPL: 1.5 G/DL
ALP SERPL-CCNC: 71 U/L (ref 39–117)
ALT SERPL W P-5'-P-CCNC: 24 U/L (ref 1–41)
ANION GAP SERPL CALCULATED.3IONS-SCNC: 12.6 MMOL/L (ref 5–15)
AST SERPL-CCNC: 18 U/L (ref 1–40)
BILIRUB SERPL-MCNC: 0.3 MG/DL (ref 0–1.2)
BUN SERPL-MCNC: 24 MG/DL (ref 6–20)
BUN/CREAT SERPL: 20.9 (ref 7–25)
CALCIUM SPEC-SCNC: 9.1 MG/DL (ref 8.6–10.5)
CHLORIDE SERPL-SCNC: 104 MMOL/L (ref 98–107)
CO2 SERPL-SCNC: 24.4 MMOL/L (ref 22–29)
CREAT SERPL-MCNC: 1.15 MG/DL (ref 0.76–1.27)
DEPRECATED RDW RBC AUTO: 36.5 FL (ref 37–54)
EGFRCR SERPLBLD CKD-EPI 2021: 74.7 ML/MIN/1.73
ERYTHROCYTE [DISTWIDTH] IN BLOOD BY AUTOMATED COUNT: 13 % (ref 12.3–15.4)
FOLATE SERPL-MCNC: >20 NG/ML (ref 4.78–24.2)
GLOBULIN UR ELPH-MCNC: 2.7 GM/DL
GLUCOSE SERPL-MCNC: 118 MG/DL (ref 65–99)
HCT VFR BLD AUTO: 37.9 % (ref 37.5–51)
HGB BLD-MCNC: 12.1 G/DL (ref 13–17.7)
MCH RBC QN AUTO: 25.5 PG (ref 26.6–33)
MCHC RBC AUTO-ENTMCNC: 31.9 G/DL (ref 31.5–35.7)
MCV RBC AUTO: 79.8 FL (ref 79–97)
PLATELET # BLD AUTO: 234 10*3/MM3 (ref 140–450)
PMV BLD AUTO: 10.6 FL (ref 6–12)
POTASSIUM SERPL-SCNC: 3.5 MMOL/L (ref 3.5–5.2)
PROT SERPL-MCNC: 6.8 G/DL (ref 6–8.5)
RBC # BLD AUTO: 4.75 10*6/MM3 (ref 4.14–5.8)
SODIUM SERPL-SCNC: 141 MMOL/L (ref 136–145)
VIT B12 BLD-MCNC: 370 PG/ML (ref 211–946)
WBC NRBC COR # BLD AUTO: 7.96 10*3/MM3 (ref 3.4–10.8)

## 2024-02-13 PROCEDURE — 99255 IP/OBS CONSLTJ NEW/EST HI 80: CPT | Performed by: STUDENT IN AN ORGANIZED HEALTH CARE EDUCATION/TRAINING PROGRAM

## 2024-02-13 PROCEDURE — 99214 OFFICE O/P EST MOD 30 MIN: CPT | Performed by: INTERNAL MEDICINE

## 2024-02-13 PROCEDURE — G0378 HOSPITAL OBSERVATION PER HR: HCPCS

## 2024-02-13 PROCEDURE — 82607 VITAMIN B-12: CPT | Performed by: STUDENT IN AN ORGANIZED HEALTH CARE EDUCATION/TRAINING PROGRAM

## 2024-02-13 PROCEDURE — 85027 COMPLETE CBC AUTOMATED: CPT | Performed by: NURSE PRACTITIONER

## 2024-02-13 PROCEDURE — 82746 ASSAY OF FOLIC ACID SERUM: CPT | Performed by: STUDENT IN AN ORGANIZED HEALTH CARE EDUCATION/TRAINING PROGRAM

## 2024-02-13 PROCEDURE — 83695 ASSAY OF LIPOPROTEIN(A): CPT | Performed by: STUDENT IN AN ORGANIZED HEALTH CARE EDUCATION/TRAINING PROGRAM

## 2024-02-13 PROCEDURE — 80053 COMPREHEN METABOLIC PANEL: CPT | Performed by: NURSE PRACTITIONER

## 2024-02-13 PROCEDURE — 96376 TX/PRO/DX INJ SAME DRUG ADON: CPT

## 2024-02-13 PROCEDURE — 25010000002 ONDANSETRON PER 1 MG: Performed by: NURSE PRACTITIONER

## 2024-02-13 RX ORDER — LORAZEPAM 0.5 MG/1
0.5 TABLET ORAL EVERY 8 HOURS PRN
Status: DISCONTINUED | OUTPATIENT
Start: 2024-02-13 | End: 2024-02-14 | Stop reason: HOSPADM

## 2024-02-13 RX ORDER — LORAZEPAM 1 MG/1
1 TABLET ORAL ONCE
Status: COMPLETED | OUTPATIENT
Start: 2024-02-13 | End: 2024-02-13

## 2024-02-13 RX ORDER — POLYETHYLENE GLYCOL 3350 17 G/17G
0.5 POWDER, FOR SOLUTION ORAL EVERY 12 HOURS
Status: COMPLETED | OUTPATIENT
Start: 2024-02-13 | End: 2024-02-14

## 2024-02-13 RX ORDER — LORAZEPAM 0.5 MG/1
0.5 TABLET ORAL ONCE
Status: COMPLETED | OUTPATIENT
Start: 2024-02-13 | End: 2024-02-13

## 2024-02-13 RX ORDER — CHOLECALCIFEROL (VITAMIN D3) 125 MCG
5 CAPSULE ORAL ONCE
Status: COMPLETED | OUTPATIENT
Start: 2024-02-13 | End: 2024-02-13

## 2024-02-13 RX ADMIN — Medication 5 MG: at 22:55

## 2024-02-13 RX ADMIN — Medication 5 MG: at 20:12

## 2024-02-13 RX ADMIN — METHOCARBAMOL TABLETS 750 MG: 750 TABLET, COATED ORAL at 18:02

## 2024-02-13 RX ADMIN — Medication 100 MG: at 07:53

## 2024-02-13 RX ADMIN — HYDRALAZINE HYDROCHLORIDE 100 MG: 50 TABLET ORAL at 21:46

## 2024-02-13 RX ADMIN — LORAZEPAM 0.5 MG: 0.5 TABLET ORAL at 08:08

## 2024-02-13 RX ADMIN — ATORVASTATIN CALCIUM 80 MG: 80 TABLET, FILM COATED ORAL at 20:12

## 2024-02-13 RX ADMIN — HYDRALAZINE HYDROCHLORIDE 100 MG: 50 TABLET ORAL at 06:02

## 2024-02-13 RX ADMIN — METOPROLOL SUCCINATE 50 MG: 50 TABLET, FILM COATED, EXTENDED RELEASE ORAL at 07:53

## 2024-02-13 RX ADMIN — PANTOPRAZOLE SODIUM 40 MG: 40 TABLET, DELAYED RELEASE ORAL at 06:02

## 2024-02-13 RX ADMIN — HYDRALAZINE HYDROCHLORIDE 100 MG: 50 TABLET ORAL at 13:20

## 2024-02-13 RX ADMIN — SPIRONOLACTONE 25 MG: 25 TABLET ORAL at 07:54

## 2024-02-13 RX ADMIN — FOLIC ACID 1 MG: 1 TABLET ORAL at 07:52

## 2024-02-13 RX ADMIN — LORAZEPAM 0.5 MG: 0.5 TABLET ORAL at 17:59

## 2024-02-13 RX ADMIN — Medication 10 ML: at 20:12

## 2024-02-13 RX ADMIN — LORAZEPAM 1 MG: 1 TABLET ORAL at 03:18

## 2024-02-13 RX ADMIN — POLYETHYLENE GLYCOL 3350 0.5 BOTTLE: 17 POWDER, FOR SOLUTION ORAL at 17:59

## 2024-02-13 RX ADMIN — METHOCARBAMOL TABLETS 750 MG: 750 TABLET, COATED ORAL at 13:20

## 2024-02-13 RX ADMIN — LISINOPRIL 40 MG: 20 TABLET ORAL at 07:53

## 2024-02-13 RX ADMIN — HYDROCHLOROTHIAZIDE 12.5 MG: 12.5 TABLET ORAL at 07:52

## 2024-02-13 RX ADMIN — METHOCARBAMOL TABLETS 750 MG: 750 TABLET, COATED ORAL at 20:12

## 2024-02-13 RX ADMIN — AMLODIPINE BESYLATE 5 MG: 5 TABLET ORAL at 07:53

## 2024-02-13 RX ADMIN — ASPIRIN 81 MG: 81 TABLET, COATED ORAL at 07:52

## 2024-02-13 RX ADMIN — Medication 1 TABLET: at 07:52

## 2024-02-13 RX ADMIN — METHOCARBAMOL TABLETS 750 MG: 750 TABLET, COATED ORAL at 07:53

## 2024-02-13 RX ADMIN — ONDANSETRON 4 MG: 2 INJECTION INTRAMUSCULAR; INTRAVENOUS at 02:42

## 2024-02-13 RX ADMIN — OXYCODONE HYDROCHLORIDE AND ACETAMINOPHEN 1 TABLET: 5; 325 TABLET ORAL at 09:26

## 2024-02-13 RX ADMIN — VILAZODONE HYDROCHLORIDE 40 MG: 40 TABLET, FILM COATED ORAL at 07:53

## 2024-02-13 NOTE — CASE MANAGEMENT/SOCIAL WORK
Discharge Planning Assessment  [facility]     Patient Name: Flako Coreas  MRN: [id number]  Today's Date: 2/13/2024    Admit Date: 2/12/2024    Plan: Plans to return home at d/c-ERIC Castillo RN   Discharge Needs Assessment       Row Name 02/13/24 1152       Living Environment    People in Home alone    Potentially Unsafe Housing Conditions none    Primary Care Provided by self    Provides Primary Care For no one    Able to Return to Prior Arrangements yes       Resource/Environmental Concerns    Resource/Environmental Concerns none    Transportation Concerns none       Transition Planning    Patient/Family Anticipates Transition to home    Patient/Family Anticipated Services at Transition none    Transportation Anticipated family or friend will provide       Discharge Needs Assessment    Equipment Currently Used at Home shower chair;cane, straight;walker, rolling  has these devices at home but not using them at present time    Concerns to be Addressed no discharge needs identified    Anticipated Changes Related to Illness none    Equipment Needed After Discharge none    Provided Post Acute Provider List? N/A    Provided Post Acute Provider Quality & Resource List? N/A                   Discharge Plan       Row Name 02/13/24 1153       Plan    Patient/Family in Agreement with Plan yes    Provided Post Acute Provider List? N/A    Provided Post Acute Provider Quality & Resource List? N/A    Plan Comments Spoke w/ pt at bedside w/ his permission. Lives alone in ap't and is independent w/ ADLs. Has walker, cane and shower chair but is currently not using them. Denies need for any DME or community resources at d/c. To return home at d/c; can arrange own transport; agreeable w/ plan. CM will continue to follow-ERIC Castillo RN                  Continued Care and Services - Admitted Since 2/12/2024    Coordination has not been started for this encounter.          Demographic Summary       Row Name 02/13/24 1156        General Information    Admission Type observation    Arrived From emergency department    Referral Source admission list    Reason for Consult discharge planning    Preferred Language English       Contact Information    Permission Granted to Share Info With                    Functional Status       Row Name 02/13/24 1152       Functional Status    Usual Activity Tolerance moderate    Current Activity Tolerance moderate       Functional Status, IADL    Medications independent    Meal Preparation independent    Housekeeping independent    Laundry independent    Shopping independent       Mental Status    General Appearance WDL WDL       Mental Status Summary    Recent Changes in Mental Status/Cognitive Functioning no changes                   Psychosocial       Row Name 02/13/24 1152       Behavior WDL    Behavior WDL WDL       Emotion Mood WDL    Emotion/Mood/Affect WDL WDL       Speech WDL    Speech WDL WDL       Perceptual State WDL    Perceptual State WDL WDL       Thought Process WDL    Thought Process WDL WDL       Intellectual Performance WDL    Intellectual Performance WDL WDL                   Abuse/Neglect    No documentation.                  Legal    No documentation.                  Substance Abuse    No documentation.                  Patient Forms    No documentation.                     Henny Castillo RN

## 2024-02-13 NOTE — SIGNIFICANT NOTE
02/13/24 0641   OTHER   Discipline physical therapist   Therapy Assessment/Plan (PT)   Criteria for Skilled Interventions Met (PT) no problems identified which require skilled intervention  (pt amb indep per nsg notes.  Nih 0; no indication for acute PT needs.)

## 2024-02-13 NOTE — CONSULTS
[facility]  Initial Inpatient Consult Note    Referring Provider: Kristina    Reason for Consultation: History of Lepe's esophagus and rectal bleeding    Subjective     History of present illness:    58 y.o. male with history of colon polyps removed, Lepe's esophagus sampled last in fall 2022, admitted after having dizziness and a fall.  Neurology has seen him restarted aspirin for history of stroke has not started any other antiplatelet therapy and has scheduled him a follow-up visit in their office.  Due to history of recent rectal bleeding and Lepe's esophagus and history of colon polyps he would like to have his EGD and colonoscopy done while he is here.    Denies melena or hematemesis    Past Medical History:  Past Medical History:   Diagnosis Date    ADHD (attention deficit hyperactivity disorder)     On going issue    Anxiety     Lepe's esophagus     Benign prostatic hyperplasia Surgery in 12/2021    Clotting disorder Intestinal    Depression     Diverticulitis     Diverticulosis     Duodenitis     Enteritis     Failure to thrive (child)     Family history of blood clots     GERD (gastroesophageal reflux disease)     Hyperlipidemia     Hypertension     Hypertensive emergency     Low testosterone     Microcytosis     Pancreatitis     Pneumonia     PONV (postoperative nausea and vomiting)     Seasonal affective disorder     Shoulder injury     Stroke     Unexplained weight loss      Past Surgical History:  Past Surgical History:   Procedure Laterality Date    CARDIAC ELECTROPHYSIOLOGY PROCEDURE N/A 1/24/2024    Procedure: Loop insertion- [employer] LINQ;  Surgeon: Kaela Walker MD;  Location: [facility] CATH INVASIVE LOCATION;  Service: Cardiovascular;  Laterality: N/A;    COLON SURGERY      COLONOSCOPY      COLONOSCOPY N/A 12/11/2022    Procedure: COLONOSCOPY INTO CECUM WITH HOT SNARE POLYPECTOMY;  Surgeon: Albert Gallo MD;  Location: [facility] ENDOSCOPY;  Service:  Gastroenterology;  Laterality: N/A;  PRE- GI BLEED  POST- DIVERTICULOSIS, POLYP    ENDOSCOPY N/A 12/10/2022    Procedure: ESOPHAGOGASTRODUODENOSCOPY;  Surgeon: Albert Gallo MD;  Location: [facility] ENDOSCOPY;  Service: Gastroenterology;  Laterality: N/A;  PRE- DARK STOOLS  POST- BARRETTS ESOPHAGUS    FRACTURE SURGERY Right 1980    for broken arm    FRACTURE SURGERY Right     for broken hand    INCISION AND DRAINAGE ABSCESS  2015    anal    PROSTATE SURGERY      SMALL INTESTINE SURGERY      TONSILLECTOMY        Social History:   Social History     Tobacco Use    Smoking status: Never    Smokeless tobacco: Never   Substance Use Topics    Alcohol use: Not Currently     Comment: 12 beers on the weekend      Family History:  Family History   Problem Relation Age of Onset    Stroke Mother     Stroke Father        Home Meds:  Medications Prior to Admission   Medication Sig Dispense Refill Last Dose    amLODIPine (NORVASC) 5 MG tablet Take 1 tablet by mouth Daily. 30 tablet 0 2/11/2024    aspirin 81 MG EC tablet Take 1 tablet by mouth Daily. 90 tablet 1 2/11/2024    atorvastatin (LIPITOR) 80 MG tablet Take 1 tablet by mouth Every Night. 30 tablet 0 2/11/2024    folic acid (FOLVITE) 1 MG tablet Take 1 tablet by mouth Daily. 90 tablet 0 2/11/2024    hydrALAZINE (APRESOLINE) 100 MG tablet Take 1 tablet by mouth Every 8 (Eight) Hours. 270 tablet 1 2/11/2024    hydroCHLOROthiazide (HYDRODIURIL) 12.5 MG tablet Take 1 tablet by mouth Daily. 30 tablet 1 2/11/2024    lisinopril (PRINIVIL,ZESTRIL) 40 MG tablet Take 1 tablet by mouth Daily. 90 tablet 1 2/11/2024    melatonin 5 MG tablet tablet Take 1 tablet by mouth Every Night. 90 tablet 0 Past Week    metoprolol succinate XL (TOPROL-XL) 50 MG 24 hr tablet Take 1 tablet by mouth Daily. 90 tablet 3 2/11/2024    multivitamin (One-Daily Multi-Vitamin) tablet tablet Take 1 tablet by mouth Daily. 90 tablet 0 2/11/2024    pantoprazole (PROTONIX) 40 MG EC tablet Take 1 tablet by mouth  Every Morning. 30 tablet 1 2/11/2024    sennosides-docusate (PERICOLACE) 8.6-50 MG per tablet Take 2 tablets by mouth 2 (Two) Times a Day. 120 tablet 0 Past Week    spironolactone (ALDACTONE) 25 MG tablet Take 1 tablet by mouth Daily. 90 tablet 1 2/11/2024    thiamine (VITAMIN B1) 100 MG tablet Take 1 tablet by mouth Daily. 90 tablet 0     vilazodone (Viibryd) 40 MG tablet tablet Take 1 tablet by mouth Daily. 90 tablet 1 2/11/2024    acetaminophen (TYLENOL) 325 MG tablet Take 2 tablets by mouth Every 6 (Six) Hours As Needed for Mild Pain.   More than a month    hydrOXYzine (ATARAX) 25 MG tablet Take 1 tablet by mouth 3 (Three) Times a Day As Needed for Anxiety. 90 tablet 0     zolpidem (Ambien) 5 MG tablet Take 1 tablet by mouth At Night As Needed for Sleep (Bring to the sleep lab, do not take at home.). 1 tablet 0      Current Meds:   amLODIPine, 5 mg, Oral, Daily  aspirin, 81 mg, Oral, Daily  atorvastatin, 80 mg, Oral, Nightly  folic acid, 1 mg, Oral, Daily  hydrALAZINE, 100 mg, Oral, Q8H  hydroCHLOROthiazide, 12.5 mg, Oral, Daily  lisinopril, 40 mg, Oral, Daily  melatonin, 5 mg, Oral, Nightly  methocarbamol, 750 mg, Oral, 4x Daily  metoprolol succinate XL, 50 mg, Oral, Daily  multivitamin, 1 tablet, Oral, Daily  pantoprazole, 40 mg, Oral, Q AM  polyethylene glycol, 0.5 bottle, Oral, Q12H  sodium chloride, 10 mL, Intravenous, Q12H  spironolactone, 25 mg, Oral, Daily  thiamine, 100 mg, Oral, Daily  vilazodone, 40 mg, Oral, Daily      Allergies:  No Known Allergies  Review of Systems  There is weakness fatigue all other systems reviewed and     Objective     Vital Signs  Temp:  [97.7 °F (36.5 °C)-98.6 °F (37 °C)] 98.6 °F (37 °C)  Heart Rate:  [52-88] 88  Resp:  [14-18] 17  BP: (109-160)/(58-93) 109/68  Physical Exam:  General Appearance:    Alert, cooperative, in no acute distress   Head:    Normocephalic, without obvious abnormality, atraumatic   Eyes:          conjunctivae and sclerae normal, no   icterus    Throat:   no thrush, oral mucosa moist   Neck:   Supple, no adenopathy   Lungs:     Clear to auscultation bilaterally    Heart:    Regular rhythm and normal rate    Chest Wall:    No abnormalities observed   Abdomen:     Soft, nondistended, nontender; normal bowel sounds   Extremities:   no edema, no redness   Skin:   No bruising or rash   Psychiatric:  normal mood and insight     Results Review:   I reviewed the patient's new clinical results.    Results from last 7 days   Lab Units 02/13/24  0413 02/12/24  0937   WBC 10*3/mm3 7.96 8.03   HEMOGLOBIN g/dL 12.1* 12.3*   HEMATOCRIT % 37.9 38.4   PLATELETS 10*3/mm3 234 248     Results from last 7 days   Lab Units 02/13/24  0413 02/12/24  0937   SODIUM mmol/L 141 142   POTASSIUM mmol/L 3.5 3.9   CHLORIDE mmol/L 104 104   CO2 mmol/L 24.4 24.7   BUN mg/dL 24* 21*   CREATININE mg/dL 1.15 1.10   CALCIUM mg/dL 9.1 8.7   BILIRUBIN mg/dL 0.3 0.5   ALK PHOS U/L 71 74   ALT (SGPT) U/L 24 24   AST (SGOT) U/L 18 16   GLUCOSE mg/dL 118* 95     Results from last 7 days   Lab Units 02/12/24  0937   INR  1.00     Lab Results   Lab Value Date/Time    LIPASE 34 12/09/2022 0759    LIPASE 220 (H) 12/08/2022 0653    LIPASE 87 (H) 12/02/2022 1124    LIPASE 380 (H) 07/01/2019 0727    LIPASE 766 (H) 02/12/2019 0847    LIPASE 204 12/18/2018 0633    LIPASE 190 11/27/2018 0820    LIPASE 187 11/14/2018 2334    LIPASE 182 11/10/2018 0915    LIPASE 65 (H) 11/05/2018 0630    LIPASE 146 09/18/2018 0822    LIPASE 275 07/26/2018 2028    LIPASE 468 (H) 07/18/2018 0555    LIPASE 156 06/27/2018 0440    LIPASE 190 06/18/2018 0537    LIPASE 180 05/18/2018 1538    LIPASE 153 05/12/2018 0847    LIPASE 99 03/30/2018 1455    LIPASE 87 (H) 01/01/2018 0949    LIPASE 52 12/22/2017 1029       Radiology:  CT Angiogram Head   Final Result   1. No acute intracranial abnormality.   2. Patent major arterial vasculature within the head and neck. Scattered   intra and extracranial arterial atherosclerotic disease  without severe   stenosis as detailed above.           Radiation dose reduction techniques were utilized, including automated   exposure control and exposure modulation based on body size.       This report was finalized on 2/12/2024 9:08 PM by Dr. Brandon Murphy M.D on Workstation: [id number]          CT Angiogram Neck   Final Result   1. No acute intracranial abnormality.   2. Patent major arterial vasculature within the head and neck. Scattered   intra and extracranial arterial atherosclerotic disease without severe   stenosis as detailed above.           Radiation dose reduction techniques were utilized, including automated   exposure control and exposure modulation based on body size.       This report was finalized on 2/12/2024 9:08 PM by Dr. Brandon Murphy M.D on Workstation: [id number]          MRI Brain Without Contrast   Final Result   1. No acute intracranial abnormality is identified.   2. There is a 5 mm old mid inferior left cerebellar infarct in the left   PICA territory and there is a 4 mm old lacunar infarct in the inferior   right thalamus and minimal small vessel disease in cerebral white   matter.   2. Minimal bilateral ethmoid and left maxillary sinus and mucosal   thickening. There is a tiny amount of fluid in the left mastoid air   cells and in the right petrous apical air cells unchanged. The remainder   of the MRI of the head is normal with no acute infarct seen.       MRI OF THE LUMBAR SPINE TECHNIQUE: Sagittal T1, proton density and   fat-suppressed T2 weighted images were obtained lumbar spine. In   addition axial T2 weighted images were obtained from T11 to S2 and   thin-cut axial T1-weighted images were obtained angle through the   interspaces from T12 to S1.       This is correlated to lumbar spine plain film series on 02/12/2024.       FINDINGS: The distal thoracic cord and the conus is normal in signal   intensity. The conus terminates at L1-2 interspace level which is   normal.        At T12-L1 there is prominent size anterior marginal osteophytes   posterior disc margin facets are normal with no canal or foraminal   narrowing.       L1-2 there is anterior marginal endplate spurring. There is 3 mm   retrolisthesis of L1 with respect L2 with unroofed disc material bulging   along the posterior superior endplate of L2 there is essentially no   canal or foraminal narrowing.       At L2-3 the disc space and facets are normal in appearance with no canal   or foraminal narrowing.       At L3-4 there is minimal diffuse annular disc bulge the facets are   normal there is no canal or foraminal narrowing.       At L4-5 there is some T2 high signal posterior annulus compatible with a   posterior annular fissure or tear in the posterior central disc bulge   that indents the anterior midline and thecal sac. There is minimal facet   overgrowth. There is minimal if any canal and foraminal narrowing.       L5-S1 there is minimal left mild right facet overgrowth mild disc space   narrowing, degenerative endplate changes, minimal posterior spurring.   There is no canal or lateral recess narrowing. There is mild spurring   into the left foramen with mild left foraminal narrowing. There is no   right foraminal narrowing. Incidental note is made of a conjoined right   L5 and S1 nerve sleeve which is a normal anatomic variation.       Lumbar vertebral body heights are well-maintained marrow signal   intensity lumbar spine is normal with no compression fracture seen in   the lumbar spine. The paravertebral soft tissue structures are normal.       IMPRESSION:   1. No acute osseous abnormality is seen in the lumbar spine.   2. There is mild lumbar spondylosis as described with minimal if any   canal and foraminal narrowing L4-5 and mild left foraminal narrowing   L5-S1 otherwise no canal or foraminal narrowing seen lumbar spine.       This report was finalized on 2/12/2024 1:37 PM by Dr. Miguel Turner M.D   on  Workstation: [id number]          MRI Lumbar Spine Without Contrast   Final Result   1. No acute intracranial abnormality is identified.   2. There is a 5 mm old mid inferior left cerebellar infarct in the left   PICA territory and there is a 4 mm old lacunar infarct in the inferior   right thalamus and minimal small vessel disease in cerebral white   matter.   2. Minimal bilateral ethmoid and left maxillary sinus and mucosal   thickening. There is a tiny amount of fluid in the left mastoid air   cells and in the right petrous apical air cells unchanged. The remainder   of the MRI of the head is normal with no acute infarct seen.       MRI OF THE LUMBAR SPINE TECHNIQUE: Sagittal T1, proton density and   fat-suppressed T2 weighted images were obtained lumbar spine. In   addition axial T2 weighted images were obtained from T11 to S2 and   thin-cut axial T1-weighted images were obtained angle through the   interspaces from T12 to S1.       This is correlated to lumbar spine plain film series on 02/12/2024.       FINDINGS: The distal thoracic cord and the conus is normal in signal   intensity. The conus terminates at L1-2 interspace level which is   normal.       At T12-L1 there is prominent size anterior marginal osteophytes   posterior disc margin facets are normal with no canal or foraminal   narrowing.       L1-2 there is anterior marginal endplate spurring. There is 3 mm   retrolisthesis of L1 with respect L2 with unroofed disc material bulging   along the posterior superior endplate of L2 there is essentially no   canal or foraminal narrowing.       At L2-3 the disc space and facets are normal in appearance with no canal   or foraminal narrowing.       At L3-4 there is minimal diffuse annular disc bulge the facets are   normal there is no canal or foraminal narrowing.       At L4-5 there is some T2 high signal posterior annulus compatible with a   posterior annular fissure or tear in the posterior central disc bulge    that indents the anterior midline and thecal sac. There is minimal facet   overgrowth. There is minimal if any canal and foraminal narrowing.       L5-S1 there is minimal left mild right facet overgrowth mild disc space   narrowing, degenerative endplate changes, minimal posterior spurring.   There is no canal or lateral recess narrowing. There is mild spurring   into the left foramen with mild left foraminal narrowing. There is no   right foraminal narrowing. Incidental note is made of a conjoined right   L5 and S1 nerve sleeve which is a normal anatomic variation.       Lumbar vertebral body heights are well-maintained marrow signal   intensity lumbar spine is normal with no compression fracture seen in   the lumbar spine. The paravertebral soft tissue structures are normal.       IMPRESSION:   1. No acute osseous abnormality is seen in the lumbar spine.   2. There is mild lumbar spondylosis as described with minimal if any   canal and foraminal narrowing L4-5 and mild left foraminal narrowing   L5-S1 otherwise no canal or foraminal narrowing seen lumbar spine.       This report was finalized on 2/12/2024 1:37 PM by Dr. Miguel Turner M.D   on Workstation: [id number]          XR Spine Lumbar Complete 4+VW   Final Result   FINDINGS AND IMPRESSION:   For the purpose of this dictation, last well-formed disc base to be   referred to as L5-S1.       Erycette impression deformities of the L1 and T12 vertebral bodies of   indeterminate age. Given patient history of back pain and acute injury,   further evaluation with MRI is recommended to exclude acute fracture.   There is mild retrolisthesis of L1 and L2. Multilevel mild to moderate   degenerative changes are present, most notably within the lower lumbar   spine, and attention above-mentioned MRI is recommended to better   characterize.       This report was finalized on 2/12/2024 8:56 AM by Dr. Jameson Hoffman M.D   on Workstation: [email or web address]          CT Head Without  Contrast   Preliminary Result   1. Subtle area of low density in the anterior rightward aspect of the   cuco as described. This is not seen on the previous study of 12/10/2023.   While this could be artifact small area of subacute to chronic infarct   is not entirely excluded. If further evaluation of this area is   clinically indicated, followup MRI.   2. No acute posttraumatic abnormality is seen.       COMMENT: Reviewed report.]       Radiation dose reduction techniques were utilized, including automated   exposure control and exposure modulation based on body size.              MRI Outside Films   Final Result          Assessment & Plan   Active Hospital Problems    Diagnosis     **Closed head injury        Assessment:  Dizziness  Recent falls  Closed head injury  History of stroke  History of Lepe's esophagus  History of polyps  History of diverticulosis    Plan:  I have spoken to the admitting team there is no contraindication to moving to EGD or colonoscopy tomorrow  Okay to continue aspirin  Hold any other antiplatelet therapy at this time  Bowel prep this evening with EGD and colonoscopy tomorrow      I discussed the patients findings and my recommendations with patient and nursing staff.    Albert Gallo MD

## 2024-02-13 NOTE — CONSULTS
Neurology Consult Note    Consult Date: 2/13/2024    Referring MD: Bladimir Bliss MD    Reason for Consult I have been asked to see the patient in neurological consultation to render advice and opinion regarding dizziness    Flako Coreas is a 58 y.o. male with history of ADHD, anxiety, hypertension, hyperlipidemia, history of alcohol abuse, left cerebellar strokes with no residual deficits comes into the ER after suffering dizziness and having a fall.  Neurology was consulted for dizziness and history of strokes.  Patient denies any new weakness numbness speech or vision problems.  On 2/12/2024 at around 3 PM and stop by patient's room to examine him when the consult was initially placed but patient was very sleepy and did not answer my questions and did not follow my commands so I decided to check on him next day morning, but the same day at around 2/12/2024 5 PM I got a call from the nursing patient is agitated and demanding to see a neurologist and that he has not been able to get an outpatient neurology appointment.  I spoke to him over the phone on 2/12/2024 and addressed all his concerns.  This morning when I examined him he was pleasant answering all questions appropriately but was also extremely tearful and anxious.    Stroke history  Patient was first seen on 8/17/2023 by Dr. Kraus when he came in with acute onset dizziness gait imbalance and falling towards the right at that time he was also found to be hypertensive in 220/141 MRI showed left cerebellar ischemic stroke CTA head and neck showed right vert dominant left vert hypoplastic with a possible occlusion.  He was sent home on aspirin and Plavix after TTE was unremarkable.  He came back again again on 8/22/2023 for an episode of dizziness and MRI brain was repeated which showed new left cerebellar infarcts (also a 3 Rosa MRI), at this time MEGHANN was done and also a Zio patch was done which were unremarkable and patient sent home on aspirin Plavix  "for 21 days with follow-up.  Patient came back in again on 9/2/2023 for dizziness and ataxia had a repeat MRI which was unremarkable and CTA showed same changes as prior with possible left vert occlusion versus severe stenosis but right vert dominant and good flow through the basilar.  He came back in again 10/23/2023 for complaints of left-sided face hand and arm and leg numbness, MRI was negative but CT perfusion showed possible hypoperfusion through the left cerebellar region which thought might have caused him to be symptomatic, Plavix response test was done which showed 212 he was started on Brilinta 60 mg twice daily along with aspirin for 90 days along with Lipitor 80 mg daily.      Past Medical History:   Diagnosis Date    ADHD (attention deficit hyperactivity disorder)     On going issue    Anxiety     Lepe's esophagus     Benign prostatic hyperplasia Surgery in 12/2021    Clotting disorder Intestinal    Depression     Diverticulitis     Diverticulosis     Duodenitis     Enteritis     Failure to thrive (child)     Family history of blood clots     GERD (gastroesophageal reflux disease)     Hyperlipidemia     Hypertension     Hypertensive emergency     Low testosterone     Microcytosis     Pancreatitis     Pneumonia     PONV (postoperative nausea and vomiting)     Seasonal affective disorder     Shoulder injury     Stroke     Unexplained weight loss        Exam  /77 (BP Location: Right arm, Patient Position: Lying)   Pulse 61   Temp 97.7 °F (36.5 °C) (Oral)   Resp 16   Ht 188 cm (74\")   Wt 104 kg (230 lb)   SpO2 100%   BMI 29.53 kg/m²   Gen: NAD, vitals reviewed  MS: oriented x3, normal comprehension repetition and fluency  CN: visual acuity grossly normal, PERRL, EOMI, no facial droop, no dysarthria  Motor: 5/5 throughout upper and lower extremities, normal tone  Normal sensation to pinprick bilateral upper and lower extremities  Coordination normal  DATA:    Lab Results   Component Value " Date    GLUCOSE 118 (H) 02/13/2024    CALCIUM 9.1 02/13/2024     02/13/2024    K 3.5 02/13/2024    CO2 24.4 02/13/2024     02/13/2024    BUN 24 (H) 02/13/2024    CREATININE 1.15 02/13/2024    EGFRIFAFRI >60 08/27/2022    EGFRIFNONA 98 11/05/2018    BCR 20.9 02/13/2024    ANIONGAP 12.6 02/13/2024     Lab Results   Component Value Date    WBC 7.96 02/13/2024    HGB 12.1 (L) 02/13/2024    HCT 37.9 02/13/2024    MCV 79.8 02/13/2024     02/13/2024       Lab review:   Sodium 141  Creatinine 1.15  Normal LFTs    A1c 5.2  LDL 96    WBC 7.96  Hemoglobin 12.1 and platelets 234    Imaging review:   MRI brain did not show any acute diffusion restriction he has old left cerebellar infarcts but no new changes I could not appreciate when compared to previous MRIs    CTA head and neck he has calcified atherosclerotic plaque involving the right vert left vert is possibly occluded and hypoplastic good flow through the basilar, and atherosclerotic disease involving bilateral ICAs to    Diagnoses:  Vertebrobasilar insufficiency  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 male with history of ADHD, anxiety, hypertension, hyperlipidemia, history of alcohol abuse, left cerebellar strokes with no residual deficits comes into the ER after suffering dizziness and having a fall.    Patient is well-known to neurology service at [facility], he had left cerebellar infarct from possible vertebral basilar insufficiency and atherosclerotic disease primarily involving the posterior circulation, he has been tried on aspirin Plavix and also aspirin Brilinta for greater than 90 days.  He continues to be symptomatic whenever his blood pressure drops.  MRI does not show any new stroke when compared to MRIs done in September 2023.  I still believe vascular risk  factor control and medical management is the best option here considering he does not have any new strokes.  He also complains of lower GI bleed so I would hold off on starting Brilinta now and just continue aspirin 325 mg daily and Lipitor 80 daily.    He has been on statin medication for a long time but there is no significant reduction in his LDL level so we will check lipoprotein A, his lipoprotein a remains elevated I think he needs Repatha.    He is high risk for posterior circulation strokes, in future if it does develop a new posterior circulation stroke he would be a good intervention candidate for stenting.     Should take good care of his blood pressure and keep a systolic blood pressure greater than 120 but also less than 150 avoid dehydration.    He also needs to lose weight and possibly get treated for sleep apnea outpatient.    He also has significant neuropsychiatric issues going on including severe uncontrolled anxiety and depression, and possible mood related problems there were episodes of agitation during this hospital admission.  He does need to see a psychiatrist and therapy advised inpatient evaluation which he declined and wants to see a doctor outpatient.    He was very apprehensive about not seeing nurse practitioner but I stated him that he needs to see a nurse practitioner as it is hard to get an MD appointment soon, after reassurance he is okay with following up with the nurse practitioner.    Will follow-up in stroke clinic in couple of weeks.      MDM   Reviewed: Previous charts, nursing notes and vitals   Reviewed: Previous labs and CT scan    Interpretation: Labs and CT scan   Total time providing care is :30-74 minutes. This excluded time spent performing separately reportable procedures and services  Consults :Neurology/Stroke    Please note that portions of this note were completed with a voice recognition program.     Trell Murphy MD  Neuro Hospitalist /Vascular  Neurology.

## 2024-02-13 NOTE — PLAN OF CARE
Goal Outcome Evaluation: Patient has been A&Ox4 throughout the day.  Much more pleasant but still anxious at times.  Remorseful for behavior yesterday.  Scheduled for scopes tomorrow, bowel prep began.  Vital signs stable.  Required two doses of ativan to help with his anxiety.  Robaxin has been very helpful with his back pain.  GI and neuro following.

## 2024-02-13 NOTE — PROGRESS NOTES
.ED OBSERVATION PROGRESS/DISCHARGE SUMMARY    Date of Admission: 2/12/2024   LOS: 0 days   PCP: Akash Rodriguez Jr., DO      Subjective   The night patient continued having lightheadedness and back pain.    Hospital Outcome:   58-year-old male was seen and examined at bedside following admission to the observation unit due to fall, headache and back pain.  Laboratory evaluation in the ED relatively unremarkable. MRI brain and lumbar spine without shows no evidence of acute infarction however demonstrated 5 mm old med inferior left cerebral infarct in the left peak at territory and there is 4 mm old lacunar infarct in the inferior right thalamus and minimal small vessel disease cerebral white matter.  CTA head and neck shows no evidence of stenosis.    MRI lumbar spine shows no evidence of spinal canal or neuroforaminal stenosis.  Neurology consulted and will await their evaluation in AM.    Of note, patient also reports long standing history of small bowel diverticulosis and rectal bleeding. He reports the most recent episode of rectal bleeding was 2 days ago, but reports the last month has been worse than usual. Has seen GI in the past but not recently. Hgb 12.1 today.     ROS:  General: no fevers, chills  Respiratory: no cough, dyspnea  Cardiovascular: no chest pain, palpitations  Abdomen: No abdominal pain, nausea, vomiting, or diarrhea  Neurologic: No focal weakness    Objective   Physical Exam:  I have reviewed the vital signs.  Temp:  [97.7 °F (36.5 °C)-98.7 °F (37.1 °C)] 97.7 °F (36.5 °C)  Heart Rate:  [48-82] 61  Resp:  [14-18] 18  BP: (111-160)/() 140/92  General Appearance:    Alert, cooperative, no distress  Head:    Normocephalic, atraumatic  Eyes:    Sclerae anicteric  Neck:   Supple, no mass  Lungs: Clear to auscultation bilaterally, respirations unlabored  Heart: Regular rate and rhythm, S1 and S2 normal, no murmur, rub or gallop  Abdomen:  Soft, non-tender, bowel sounds active,  nondistended  Extremities: No clubbing, cyanosis, or edema to lower extremities  Pulses:  2+ and symmetric in distal lower extremities  Skin: No rashes   Neurologic: Oriented x3, Normal strength to extremities    Results Review:    I have reviewed the labs, radiology results and diagnostic studies.    Results from last 7 days   Lab Units 02/12/24  0937   WBC 10*3/mm3 8.03   HEMOGLOBIN g/dL 12.3*   HEMATOCRIT % 38.4   PLATELETS 10*3/mm3 248     Results from last 7 days   Lab Units 02/12/24  0937   SODIUM mmol/L 142   POTASSIUM mmol/L 3.9   CHLORIDE mmol/L 104   CO2 mmol/L 24.7   BUN mg/dL 21*   CREATININE mg/dL 1.10   CALCIUM mg/dL 8.7   BILIRUBIN mg/dL 0.5   ALK PHOS U/L 74   ALT (SGPT) U/L 24   AST (SGOT) U/L 16   GLUCOSE mg/dL 95     Imaging Results (Last 24 Hours)       Procedure Component Value Units Date/Time    CT Angiogram Head [[id number]] Collected: 02/12/24 2043     Updated: 02/12/24 2111    Narrative:      CT ANGIOGRAM OF THE HEAD AND NECK WITH CONTRAST     CLINICAL HISTORY: Mechanical fall, striking the back of the head.     TECHNIQUE: CT angiogram of the head and neck was obtained with 1 mm  axial images following the administration of IV contrast. Sagittal,  coronal, and 3-dimensional reconstructed images were obtained.  Additionally, a CT scan of the head was obtained with 3 mm axial images  before and after the administration of IV contrast.     COMPARISON: MR brain 2/15/2024.     FINDINGS:     Noncontrast CT: No CT evidence of acute territorial infarction.  Unchanged chronic lacunar infarct in the right thalamus and small  chronic infarct in the inferior left cerebellum. No intracranial  hemorrhage.  No midline shift or mass effect.  No extra-axial  collections. No hydrocephalus.  Clear paranasal sinuses and mastoid air  cells.          CTA NECK: Patent major arterial vasculature.  No dissection or  pseudoaneurysm. Mixed calcified atherosclerotic plaque in the bilateral  proximal cervical internal  carotid arteries causes approximately 10 to  20% stenosis bilaterally. Calcified atherosclerotic plaque at the right  vertebral artery origin causing mild stenosis. Very diminutive left  vertebral artery.      CTA HEAD: Patent major arterial vasculature.  No aneurysm. Calcified  atherosclerotic plaque in the left greater than right supraclinoid ICAs  causing mild stenosis. Predominantly calcified atherosclerotic plaque  scattered throughout the right V4 causing few areas of mild stenosis.  Diminutive left V4. Right dominant PICA. Diminutive right P1 with larger  right posterior communicating artery.          Impression:      1. No acute intracranial abnormality.  2. Patent major arterial vasculature within the head and neck. Scattered  intra and extracranial arterial atherosclerotic disease without severe  stenosis as detailed above.        Radiation dose reduction techniques were utilized, including automated  exposure control and exposure modulation based on body size.     This report was finalized on 2/12/2024 9:08 PM by Dr. Brandon Murphy M.D on Workstation: [id number]       CT Angiogram Neck [[id number]] Collected: 02/12/24 2043     Updated: 02/12/24 2111    Narrative:      CT ANGIOGRAM OF THE HEAD AND NECK WITH CONTRAST     CLINICAL HISTORY: Mechanical fall, striking the back of the head.     TECHNIQUE: CT angiogram of the head and neck was obtained with 1 mm  axial images following the administration of IV contrast. Sagittal,  coronal, and 3-dimensional reconstructed images were obtained.  Additionally, a CT scan of the head was obtained with 3 mm axial images  before and after the administration of IV contrast.     COMPARISON: MR brain 2/15/2024.     FINDINGS:     Noncontrast CT: No CT evidence of acute territorial infarction.  Unchanged chronic lacunar infarct in the right thalamus and small  chronic infarct in the inferior left cerebellum. No intracranial  hemorrhage.  No midline shift or mass effect.  No  extra-axial  collections. No hydrocephalus.  Clear paranasal sinuses and mastoid air  cells.          CTA NECK: Patent major arterial vasculature.  No dissection or  pseudoaneurysm. Mixed calcified atherosclerotic plaque in the bilateral  proximal cervical internal carotid arteries causes approximately 10 to  20% stenosis bilaterally. Calcified atherosclerotic plaque at the right  vertebral artery origin causing mild stenosis. Very diminutive left  vertebral artery.      CTA HEAD: Patent major arterial vasculature.  No aneurysm. Calcified  atherosclerotic plaque in the left greater than right supraclinoid ICAs  causing mild stenosis. Predominantly calcified atherosclerotic plaque  scattered throughout the right V4 causing few areas of mild stenosis.  Diminutive left V4. Right dominant PICA. Diminutive right P1 with larger  right posterior communicating artery.          Impression:      1. No acute intracranial abnormality.  2. Patent major arterial vasculature within the head and neck. Scattered  intra and extracranial arterial atherosclerotic disease without severe  stenosis as detailed above.        Radiation dose reduction techniques were utilized, including automated  exposure control and exposure modulation based on body size.     This report was finalized on 2/12/2024 9:08 PM by Dr. Brandon Murphy M.D on Workstation: [id number]       MRI Brain Without Contrast [[id number]] Collected: 02/12/24 1253     Updated: 02/12/24 1340    Narrative:      MRI OF THE BRAIN AND LUMBAR SPINE WITHOUT CONTRAST ON 02/12/2024     CLINICAL HISTORY: Patient has history of a fall with head injury also  has low back pain and is off balance with difficulty walking.     MRI OF THE BRAIN TECHNIQUE: Axial T1, FLAIR, fat-suppressed T2, axial  diffusion and gradient echo T2 and sagittal T1-weighted images were  obtained of the entire head.     This is correlated to a prior MRI of the brain from [facility] on 10/26/2023 and  10/23/2023..     FINDINGS: Since prior MRI of the brain on 12/26/2023 there has been  evolution of small 5 mm subacute infarct. Tiny chronic infarct in the  mid to inferior left cerebellum left PICA territory. There is no change  in the small 4 mm old lacunar infarct in the inferior right thalamus.  There is minimal T2 high signal in periventricular white matter  consistent with minimal small vessel disease. The remainder of the brain  parenchyma is normal in signal intensity. The ventricles are normal in  size. I see no focal mass effect. There is no midline shift. No  extra-axial fluid collections are identified. There is very minimal  bilateral ethmoid inferior left maxillary sinus mucosal thickening the  remainder the paranasal sinuses where there is tiny amount of fluid in  the inferior left mastoid air cells otherwise mastoid middle ear  cavities are clear. Good flow voids are demonstrated within the cerebral  vessels and in the dural venous sinuses.       Impression:      1. No acute intracranial abnormality is identified.  2. There is a 5 mm old mid inferior left cerebellar infarct in the left  PICA territory and there is a 4 mm old lacunar infarct in the inferior  right thalamus and minimal small vessel disease in cerebral white  matter.  2. Minimal bilateral ethmoid and left maxillary sinus and mucosal  thickening. There is a tiny amount of fluid in the left mastoid air  cells and in the right petrous apical air cells unchanged. The remainder  of the MRI of the head is normal with no acute infarct seen.     MRI OF THE LUMBAR SPINE TECHNIQUE: Sagittal T1, proton density and  fat-suppressed T2 weighted images were obtained lumbar spine. In  addition axial T2 weighted images were obtained from T11 to S2 and  thin-cut axial T1-weighted images were obtained angle through the  interspaces from T12 to S1.     This is correlated to lumbar spine plain film series on 02/12/2024.     FINDINGS: The distal thoracic  cord and the conus is normal in signal  intensity. The conus terminates at L1-2 interspace level which is  normal.     At T12-L1 there is prominent size anterior marginal osteophytes  posterior disc margin facets are normal with no canal or foraminal  narrowing.     L1-2 there is anterior marginal endplate spurring. There is 3 mm  retrolisthesis of L1 with respect L2 with unroofed disc material bulging  along the posterior superior endplate of L2 there is essentially no  canal or foraminal narrowing.     At L2-3 the disc space and facets are normal in appearance with no canal  or foraminal narrowing.     At L3-4 there is minimal diffuse annular disc bulge the facets are  normal there is no canal or foraminal narrowing.     At L4-5 there is some T2 high signal posterior annulus compatible with a  posterior annular fissure or tear in the posterior central disc bulge  that indents the anterior midline and thecal sac. There is minimal facet  overgrowth. There is minimal if any canal and foraminal narrowing.     L5-S1 there is minimal left mild right facet overgrowth mild disc space  narrowing, degenerative endplate changes, minimal posterior spurring.  There is no canal or lateral recess narrowing. There is mild spurring  into the left foramen with mild left foraminal narrowing. There is no  right foraminal narrowing. Incidental note is made of a conjoined right  L5 and S1 nerve sleeve which is a normal anatomic variation.     Lumbar vertebral body heights are well-maintained marrow signal  intensity lumbar spine is normal with no compression fracture seen in  the lumbar spine. The paravertebral soft tissue structures are normal.     IMPRESSION:  1. No acute osseous abnormality is seen in the lumbar spine.  2. There is mild lumbar spondylosis as described with minimal if any  canal and foraminal narrowing L4-5 and mild left foraminal narrowing  L5-S1 otherwise no canal or foraminal narrowing seen lumbar spine.     This  report was finalized on 2/12/2024 1:37 PM by Dr. Miguel Turner M.D  on Workstation: [id number]       MRI Lumbar Spine Without Contrast [[id number]] Collected: 02/12/24 1253     Updated: 02/12/24 1340    Narrative:      MRI OF THE BRAIN AND LUMBAR SPINE WITHOUT CONTRAST ON 02/12/2024     CLINICAL HISTORY: Patient has history of a fall with head injury also  has low back pain and is off balance with difficulty walking.     MRI OF THE BRAIN TECHNIQUE: Axial T1, FLAIR, fat-suppressed T2, axial  diffusion and gradient echo T2 and sagittal T1-weighted images were  obtained of the entire head.     This is correlated to a prior MRI of the brain from [facility] on 10/26/2023 and 10/23/2023..     FINDINGS: Since prior MRI of the brain on 12/26/2023 there has been  evolution of small 5 mm subacute infarct. Tiny chronic infarct in the  mid to inferior left cerebellum left PICA territory. There is no change  in the small 4 mm old lacunar infarct in the inferior right thalamus.  There is minimal T2 high signal in periventricular white matter  consistent with minimal small vessel disease. The remainder of the brain  parenchyma is normal in signal intensity. The ventricles are normal in  size. I see no focal mass effect. There is no midline shift. No  extra-axial fluid collections are identified. There is very minimal  bilateral ethmoid inferior left maxillary sinus mucosal thickening the  remainder the paranasal sinuses where there is tiny amount of fluid in  the inferior left mastoid air cells otherwise mastoid middle ear  cavities are clear. Good flow voids are demonstrated within the cerebral  vessels and in the dural venous sinuses.       Impression:      1. No acute intracranial abnormality is identified.  2. There is a 5 mm old mid inferior left cerebellar infarct in the left  PICA territory and there is a 4 mm old lacunar infarct in the inferior  right thalamus and minimal small vessel disease in cerebral  white  matter.  2. Minimal bilateral ethmoid and left maxillary sinus and mucosal  thickening. There is a tiny amount of fluid in the left mastoid air  cells and in the right petrous apical air cells unchanged. The remainder  of the MRI of the head is normal with no acute infarct seen.     MRI OF THE LUMBAR SPINE TECHNIQUE: Sagittal T1, proton density and  fat-suppressed T2 weighted images were obtained lumbar spine. In  addition axial T2 weighted images were obtained from T11 to S2 and  thin-cut axial T1-weighted images were obtained angle through the  interspaces from T12 to S1.     This is correlated to lumbar spine plain film series on 02/12/2024.     FINDINGS: The distal thoracic cord and the conus is normal in signal  intensity. The conus terminates at L1-2 interspace level which is  normal.     At T12-L1 there is prominent size anterior marginal osteophytes  posterior disc margin facets are normal with no canal or foraminal  narrowing.     L1-2 there is anterior marginal endplate spurring. There is 3 mm  retrolisthesis of L1 with respect L2 with unroofed disc material bulging  along the posterior superior endplate of L2 there is essentially no  canal or foraminal narrowing.     At L2-3 the disc space and facets are normal in appearance with no canal  or foraminal narrowing.     At L3-4 there is minimal diffuse annular disc bulge the facets are  normal there is no canal or foraminal narrowing.     At L4-5 there is some T2 high signal posterior annulus compatible with a  posterior annular fissure or tear in the posterior central disc bulge  that indents the anterior midline and thecal sac. There is minimal facet  overgrowth. There is minimal if any canal and foraminal narrowing.     L5-S1 there is minimal left mild right facet overgrowth mild disc space  narrowing, degenerative endplate changes, minimal posterior spurring.  There is no canal or lateral recess narrowing. There is mild spurring  into the left  foramen with mild left foraminal narrowing. There is no  right foraminal narrowing. Incidental note is made of a conjoined right  L5 and S1 nerve sleeve which is a normal anatomic variation.     Lumbar vertebral body heights are well-maintained marrow signal  intensity lumbar spine is normal with no compression fracture seen in  the lumbar spine. The paravertebral soft tissue structures are normal.     IMPRESSION:  1. No acute osseous abnormality is seen in the lumbar spine.  2. There is mild lumbar spondylosis as described with minimal if any  canal and foraminal narrowing L4-5 and mild left foraminal narrowing  L5-S1 otherwise no canal or foraminal narrowing seen lumbar spine.     This report was finalized on 2/12/2024 1:37 PM by Dr. Miguel Turner M.D  on Workstation: [id number]       MRI Outside Films [[id number]] Resulted: 02/12/24 1238     Updated: 02/12/24 1239    Narrative:      This procedure was auto-finalized with no dictation required.    XR Spine Lumbar Complete 4+VW [[id number]] Collected: 02/12/24 0851     Updated: 02/12/24 0859    Narrative:      Lumbar spine radiograph     HISTORY: Pain, fall     TECHNIQUE: AP, lateral, oblique and spot views of the lumbar spine     COMPARISON: None       Impression:      FINDINGS AND IMPRESSION:  For the purpose of this dictation, last well-formed disc base to be  referred to as L5-S1.     Erycette impression deformities of the L1 and T12 vertebral bodies of  indeterminate age. Given patient history of back pain and acute injury,  further evaluation with MRI is recommended to exclude acute fracture.  There is mild retrolisthesis of L1 and L2. Multilevel mild to moderate  degenerative changes are present, most notably within the lower lumbar  spine, and attention above-mentioned MRI is recommended to better  characterize.     This report was finalized on 2/12/2024 8:56 AM by Dr. Jameson Hoffman M.D  on Workstation: [id number]       CT Head Without Contrast [[id number]]  Collected: 02/12/24 0754     Updated: 02/12/24 0754    Narrative:      CT OF THE BRAIN WITHOUT CONTRAST 02/12/2024     HISTORY: Fell. Head injury.     Axial images were obtained through the brain without intravenous  contrast.     There is a subtle area of low density in the anterior rightward aspect  of the cuco such as on axial images 19 and 20. This is not clearly seen  on the previous study of 12/10/2023. This could represent subtle area of  subacute to chronic infarct but could also be artifact. Brain parenchyma  and ventricular system otherwise appear within normal limits. No mass  lesions, midline shift, intracranial hemorrhage or evidence of  infarction is demonstrated. There is some distal internal carotid and  distal vertebral artery calcification.     No bony abnormalities are seen.       Impression:      1. Subtle area of low density in the anterior rightward aspect of the  cuco as described. This is not seen on the previous study of 12/10/2023.  While this could be artifact small area of subacute to chronic infarct  is not entirely excluded. If further evaluation of this area is  clinically indicated, followup MRI.  2. No acute posttraumatic abnormality is seen.     COMMENT: Reviewed report.]     Radiation dose reduction techniques were utilized, including automated  exposure control and exposure modulation based on body size.                  I have reviewed the medications.  ---------------------------------------------------------------------------------------------  Assessment & Plan   Assessment/Problem List    Closed head injury      Plan:  Dizziness  Headache  Back pain  History of CVA  -MRI brain and lumbar spine without shows no evidence of acute infarction however demonstrated 5 mm old med inferior left cerebral infarct in the left peak at territory and there is 4 mm old lacunar infarct in the inferior right thalamus and minimal small vessel disease cerebral white matter.  -CTA head and neck  shows no evidence of stenosis  Fall precautions  Neurochecks every 4 hours  MRI lumbar spine without shows mild lumbar spondylosis with minimal canal and foraminal narrowing at L4-5 and mild left foraminal narrowing L5-S1     Rectal bleeding  -Consult GI  -hgb 12.1 today, will trend     Hypertension  Vitals every 4 hours  Continue home medications    Disposition: Pending neurology evaluation    Follow-up after Discharge: PCP and neurology    This note will serve as a discharge summary    Kiana Treviño, APRN 02/13/24 07:30 EST    I have worn appropriate PPE during this patient encounter, sanitized my hands both with entering and exiting patient's room.      52 minutes has been spent by [facility] providers in the care of this patient while under observation status

## 2024-02-13 NOTE — PLAN OF CARE
Problem: Adult Inpatient Plan of Care  Goal: Plan of Care Review  Outcome: Ongoing, Progressing  Flowsheets (Taken 2/13/2024 0121)  Progress: improving  Plan of Care Reviewed With: patient  Goal: Patient-Specific Goal (Individualized)  Outcome: Ongoing, Progressing  Goal: Absence of Hospital-Acquired Illness or Injury  Outcome: Ongoing, Progressing  Intervention: Identify and Manage Fall Risk  Recent Flowsheet Documentation  Taken 2/13/2024 0000 by Logan Hoffman RN  Safety Promotion/Fall Prevention: safety round/check completed  Taken 2/12/2024 2200 by Logan Hoffman RN  Safety Promotion/Fall Prevention:   activity supervised   clutter free environment maintained   fall prevention program maintained   nonskid shoes/slippers when out of bed   safety round/check completed   room organization consistent  Taken 2/12/2024 1945 by Logan Hoffman RN  Safety Promotion/Fall Prevention: activity supervised  Intervention: Prevent Skin Injury  Recent Flowsheet Documentation  Taken 2/13/2024 0000 by Logan Hoffman RN  Body Position: position changed independently  Taken 2/12/2024 2200 by Logan Hoffman RN  Body Position: position changed independently  Taken 2/12/2024 1945 by Logan Hoffman RN  Body Position: position changed independently  Intervention: Prevent and Manage VTE (Venous Thromboembolism) Risk  Recent Flowsheet Documentation  Taken 2/13/2024 0000 by Logan Hoffman RN  Activity Management: activity encouraged  Taken 2/12/2024 1945 by Logan Hoffman RN  Activity Management: activity encouraged  Intervention: Prevent Infection  Recent Flowsheet Documentation  Taken 2/13/2024 0000 by Logan Hoffman RN  Infection Prevention:   rest/sleep promoted   single patient room provided  Taken 2/12/2024 2200 by Logan Hoffman RN  Infection Prevention:   rest/sleep promoted   hand hygiene promoted   single patient room provided   personal protective equipment utilized  Taken 2/12/2024 1945 by Logan Hoffman RN  Infection Prevention:   single patient  room provided   rest/sleep promoted  Goal: Optimal Comfort and Wellbeing  Outcome: Ongoing, Progressing  Intervention: Monitor Pain and Promote Comfort  Recent Flowsheet Documentation  Taken 2/13/2024 0000 by Logan Hoffman RN  Pain Management Interventions:   see MAR   relaxation techniques promoted   quiet environment facilitated   care clustered  Taken 2/12/2024 1945 by Logan Hoffman RN  Pain Management Interventions: see MAR  Intervention: Provide Person-Centered Care  Recent Flowsheet Documentation  Taken 2/12/2024 1945 by Logan Hoffman RN  Trust Relationship/Rapport:   care explained   choices provided  Goal: Readiness for Transition of Care  Outcome: Ongoing, Progressing     Problem: Fall Injury Risk  Goal: Absence of Fall and Fall-Related Injury  Outcome: Ongoing, Progressing  Intervention: Identify and Manage Contributors  Recent Flowsheet Documentation  Taken 2/13/2024 0000 by Logan Hoffman RN  Medication Review/Management: medications reviewed  Taken 2/12/2024 2200 by Logan Hoffman RN  Medication Review/Management: medications reviewed  Taken 2/12/2024 1945 by Logan Hoffman RN  Medication Review/Management: medications reviewed  Intervention: Promote Injury-Free Environment  Recent Flowsheet Documentation  Taken 2/13/2024 0000 by Logan Hoffman RN  Safety Promotion/Fall Prevention: safety round/check completed  Taken 2/12/2024 2200 by Logan Hoffman RN  Safety Promotion/Fall Prevention:   activity supervised   clutter free environment maintained   fall prevention program maintained   nonskid shoes/slippers when out of bed   safety round/check completed   room organization consistent  Taken 2/12/2024 1945 by Logan Hoffman RN  Safety Promotion/Fall Prevention: activity supervised   Goal Outcome Evaluation:  Plan of Care Reviewed With: patient        Progress: improving

## 2024-02-14 ENCOUNTER — ANESTHESIA (OUTPATIENT)
Dept: GASTROENTEROLOGY | Facility: HOSPITAL | Age: 59
End: 2024-02-14
Payer: MEDICAID

## 2024-02-14 ENCOUNTER — ANESTHESIA EVENT (OUTPATIENT)
Dept: GASTROENTEROLOGY | Facility: HOSPITAL | Age: 59
End: 2024-02-14
Payer: MEDICAID

## 2024-02-14 ENCOUNTER — READMISSION MANAGEMENT (OUTPATIENT)
Dept: CALL CENTER | Facility: HOSPITAL | Age: 59
End: 2024-02-14
Payer: MEDICAID

## 2024-02-14 VITALS
DIASTOLIC BLOOD PRESSURE: 61 MMHG | TEMPERATURE: 98.8 F | HEIGHT: 74 IN | WEIGHT: 230 LBS | OXYGEN SATURATION: 97 % | HEART RATE: 71 BPM | RESPIRATION RATE: 18 BRPM | SYSTOLIC BLOOD PRESSURE: 135 MMHG | BODY MASS INDEX: 29.52 KG/M2

## 2024-02-14 LAB
ALBUMIN SERPL-MCNC: 4.4 G/DL (ref 3.5–5.2)
ALBUMIN/GLOB SERPL: 2.1 G/DL
ALP SERPL-CCNC: 72 U/L (ref 39–117)
ALT SERPL W P-5'-P-CCNC: 21 U/L (ref 1–41)
ANION GAP SERPL CALCULATED.3IONS-SCNC: 10 MMOL/L (ref 5–15)
AST SERPL-CCNC: 15 U/L (ref 1–40)
BASOPHILS # BLD AUTO: 0.02 10*3/MM3 (ref 0–0.2)
BASOPHILS NFR BLD AUTO: 0.3 % (ref 0–1.5)
BILIRUB SERPL-MCNC: 0.6 MG/DL (ref 0–1.2)
BUN SERPL-MCNC: 14 MG/DL (ref 6–20)
BUN/CREAT SERPL: 15.2 (ref 7–25)
CALCIUM SPEC-SCNC: 9 MG/DL (ref 8.6–10.5)
CHLORIDE SERPL-SCNC: 105 MMOL/L (ref 98–107)
CO2 SERPL-SCNC: 24 MMOL/L (ref 22–29)
CREAT SERPL-MCNC: 0.92 MG/DL (ref 0.76–1.27)
DEPRECATED RDW RBC AUTO: 39.2 FL (ref 37–54)
EGFRCR SERPLBLD CKD-EPI 2021: 97.6 ML/MIN/1.73
EOSINOPHIL # BLD AUTO: 0.42 10*3/MM3 (ref 0–0.4)
EOSINOPHIL NFR BLD AUTO: 5.7 % (ref 0.3–6.2)
ERYTHROCYTE [DISTWIDTH] IN BLOOD BY AUTOMATED COUNT: 13.1 % (ref 12.3–15.4)
GLOBULIN UR ELPH-MCNC: 2.1 GM/DL
GLUCOSE BLDC GLUCOMTR-MCNC: 107 MG/DL (ref 70–130)
GLUCOSE BLDC GLUCOMTR-MCNC: 118 MG/DL (ref 70–130)
GLUCOSE SERPL-MCNC: 105 MG/DL (ref 65–99)
HCT VFR BLD AUTO: 38.8 % (ref 37.5–51)
HGB BLD-MCNC: 12.5 G/DL (ref 13–17.7)
IMM GRANULOCYTES # BLD AUTO: 0.03 10*3/MM3 (ref 0–0.05)
IMM GRANULOCYTES NFR BLD AUTO: 0.4 % (ref 0–0.5)
INR PPP: 0.99 (ref 0.9–1.1)
LPA SERPL-SCNC: 209.9 NMOL/L
LYMPHOCYTES # BLD AUTO: 1.62 10*3/MM3 (ref 0.7–3.1)
LYMPHOCYTES NFR BLD AUTO: 22 % (ref 19.6–45.3)
MCH RBC QN AUTO: 26.5 PG (ref 26.6–33)
MCHC RBC AUTO-ENTMCNC: 32.2 G/DL (ref 31.5–35.7)
MCV RBC AUTO: 82.2 FL (ref 79–97)
MONOCYTES # BLD AUTO: 0.65 10*3/MM3 (ref 0.1–0.9)
MONOCYTES NFR BLD AUTO: 8.8 % (ref 5–12)
NEUTROPHILS NFR BLD AUTO: 4.63 10*3/MM3 (ref 1.7–7)
NEUTROPHILS NFR BLD AUTO: 62.8 % (ref 42.7–76)
NRBC BLD AUTO-RTO: 0 /100 WBC (ref 0–0.2)
PLATELET # BLD AUTO: 235 10*3/MM3 (ref 140–450)
PMV BLD AUTO: 10.4 FL (ref 6–12)
POTASSIUM SERPL-SCNC: 3.7 MMOL/L (ref 3.5–5.2)
PROT SERPL-MCNC: 6.5 G/DL (ref 6–8.5)
PROTHROMBIN TIME: 13.2 SECONDS (ref 11.7–14.2)
RBC # BLD AUTO: 4.72 10*6/MM3 (ref 4.14–5.8)
SODIUM SERPL-SCNC: 139 MMOL/L (ref 136–145)
WBC NRBC COR # BLD AUTO: 7.37 10*3/MM3 (ref 3.4–10.8)

## 2024-02-14 PROCEDURE — 85610 PROTHROMBIN TIME: CPT | Performed by: INTERNAL MEDICINE

## 2024-02-14 PROCEDURE — 85025 COMPLETE CBC W/AUTO DIFF WBC: CPT | Performed by: INTERNAL MEDICINE

## 2024-02-14 PROCEDURE — 43239 EGD BIOPSY SINGLE/MULTIPLE: CPT | Performed by: INTERNAL MEDICINE

## 2024-02-14 PROCEDURE — 82948 REAGENT STRIP/BLOOD GLUCOSE: CPT

## 2024-02-14 PROCEDURE — G0378 HOSPITAL OBSERVATION PER HR: HCPCS

## 2024-02-14 PROCEDURE — 88305 TISSUE EXAM BY PATHOLOGIST: CPT | Performed by: INTERNAL MEDICINE

## 2024-02-14 PROCEDURE — 25810000003 SODIUM CHLORIDE 0.9 % SOLUTION: Performed by: INTERNAL MEDICINE

## 2024-02-14 PROCEDURE — 80053 COMPREHEN METABOLIC PANEL: CPT | Performed by: INTERNAL MEDICINE

## 2024-02-14 PROCEDURE — 25010000002 PROPOFOL 10 MG/ML EMULSION: Performed by: NURSE ANESTHETIST, CERTIFIED REGISTERED

## 2024-02-14 PROCEDURE — 45378 DIAGNOSTIC COLONOSCOPY: CPT | Performed by: INTERNAL MEDICINE

## 2024-02-14 RX ORDER — SODIUM CHLORIDE 9 MG/ML
1000 INJECTION, SOLUTION INTRAVENOUS CONTINUOUS
Status: DISCONTINUED | OUTPATIENT
Start: 2024-02-14 | End: 2024-02-14 | Stop reason: HOSPADM

## 2024-02-14 RX ORDER — LIDOCAINE HYDROCHLORIDE 20 MG/ML
INJECTION, SOLUTION INFILTRATION; PERINEURAL AS NEEDED
Status: DISCONTINUED | OUTPATIENT
Start: 2024-02-14 | End: 2024-02-14 | Stop reason: SURG

## 2024-02-14 RX ORDER — PROPOFOL 10 MG/ML
VIAL (ML) INTRAVENOUS AS NEEDED
Status: DISCONTINUED | OUTPATIENT
Start: 2024-02-14 | End: 2024-02-14 | Stop reason: SURG

## 2024-02-14 RX ORDER — METHOCARBAMOL 750 MG/1
750 TABLET, FILM COATED ORAL 4 TIMES DAILY PRN
Qty: 60 TABLET | Refills: 0 | Status: SHIPPED | OUTPATIENT
Start: 2024-02-14 | End: 2024-02-26

## 2024-02-14 RX ADMIN — PROPOFOL 200 MCG/KG/MIN: 10 INJECTION, EMULSION INTRAVENOUS at 13:30

## 2024-02-14 RX ADMIN — LORAZEPAM 0.5 MG: 0.5 TABLET ORAL at 14:57

## 2024-02-14 RX ADMIN — METHOCARBAMOL TABLETS 750 MG: 750 TABLET, COATED ORAL at 14:57

## 2024-02-14 RX ADMIN — LORAZEPAM 0.5 MG: 0.5 TABLET ORAL at 05:52

## 2024-02-14 RX ADMIN — PROPOFOL 100 MG: 10 INJECTION, EMULSION INTRAVENOUS at 13:30

## 2024-02-14 RX ADMIN — METHOCARBAMOL TABLETS 750 MG: 750 TABLET, COATED ORAL at 07:51

## 2024-02-14 RX ADMIN — HYDRALAZINE HYDROCHLORIDE 100 MG: 50 TABLET ORAL at 14:57

## 2024-02-14 RX ADMIN — POLYETHYLENE GLYCOL 3350 0.5 BOTTLE: 17 POWDER, FOR SOLUTION ORAL at 05:02

## 2024-02-14 RX ADMIN — HYDRALAZINE HYDROCHLORIDE 100 MG: 50 TABLET ORAL at 05:01

## 2024-02-14 RX ADMIN — LIDOCAINE HYDROCHLORIDE 50 MG: 20 INJECTION, SOLUTION INFILTRATION; PERINEURAL at 13:30

## 2024-02-14 RX ADMIN — SODIUM CHLORIDE 1000 ML: 9 INJECTION, SOLUTION INTRAVENOUS at 13:20

## 2024-02-14 RX ADMIN — PANTOPRAZOLE SODIUM 40 MG: 40 TABLET, DELAYED RELEASE ORAL at 05:01

## 2024-02-14 NOTE — DISCHARGE SUMMARY
.ED OBSERVATION PROGRESS/DISCHARGE SUMMARY    Date of Admission: 2/12/2024   LOS: 0 days   PCP: Akash Rodriguez Jr., DO    Subjective:   Patient is tolerating oral intake.  He states he is ready to leave and does not want to wait any longer stating that he needs to go to the pharmacy.    Hospital Outcome:   58-year-old male was seen and examined at bedside following admission to the observation unit due to fall, headache and back pain.  Laboratory evaluation in the ED relatively unremarkable. MRI brain and lumbar spine without shows no evidence of acute infarction however demonstrated 5 mm old med inferior left cerebral infarct in the left peak at territory and there is 4 mm old lacunar infarct in the inferior right thalamus and minimal small vessel disease cerebral white matter.  CTA head and neck shows no evidence of stenosis.     MRI lumbar spine shows no evidence of spinal canal or neuroforaminal stenosis.   evaluated patient and has recommended vascular risk factor control and medical management including systolic blood pressure greater than 120 and less than 150 and to avoid dehydration.     Patient also reports long standing history of small bowel diverticulosis and rectal bleeding. He reports the most recent episode of rectal bleeding was 2 days ago, but reports the last month has been worse than usual. Has seen GI in the past but not recently.     Patient underwent EGD and colonoscopy today.  EGD shows a evidence of hiatal hernia and Lepe's disease.  Biopsies taken.  Colonoscopy shows diverticulosis in the sigmoid colon and the descending colon, nonbleeding internal hemorrhoids.  GI has recommended for patient to follow-up for in 3 weeks, they will contact him regarding pathology results in 2 weeks.  Patient to continue on Protonix.    ROS:  General: no fevers, chills  Respiratory: no cough, dyspnea  Cardiovascular: no chest pain, palpitations  Abdomen: No abdominal pain, nausea,  vomiting, or diarrhea  Neurologic: No focal weakness    Objective   Physical Exam:  I have reviewed the vital signs.  Temp:  [98.2 °F (36.8 °C)-98.6 °F (37 °C)] 98.2 °F (36.8 °C)  Heart Rate:  [59-88] 63  Resp:  [16-17] 16  BP: (109-159)/(68-83) 118/74  General Appearance:  58-year-old male in no acute distress on room air  Head:    Normocephalic, atraumatic  Eyes:    Sclerae anicteric  Neck:   Supple, no mass  Lungs: Clear to auscultation bilaterally, respirations unlabored  Heart: Regular rate and rhythm, S1 and S2 normal, no murmur, rub or gallop  Abdomen:  Soft, non-tender, bowel sounds active, nondistended  Extremities: No clubbing, cyanosis, or edema to lower extremities  Pulses:  2+ and symmetric in distal lower extremities  Skin: No rashes   Neurologic: Oriented x3, Normal strength to extremities    Results Review:    I have reviewed the labs, radiology results and diagnostic studies.    Results from last 7 days   Lab Units 02/13/24  0413   WBC 10*3/mm3 7.96   HEMOGLOBIN g/dL 12.1*   HEMATOCRIT % 37.9   PLATELETS 10*3/mm3 234     Results from last 7 days   Lab Units 02/13/24  0413 02/12/24  0937   SODIUM mmol/L 141 142   POTASSIUM mmol/L 3.5 3.9   CHLORIDE mmol/L 104 104   CO2 mmol/L 24.4 24.7   BUN mg/dL 24* 21*   CREATININE mg/dL 1.15 1.10   CALCIUM mg/dL 9.1 8.7   BILIRUBIN mg/dL 0.3 0.5   ALK PHOS U/L 71 74   ALT (SGPT) U/L 24 24   AST (SGOT) U/L 18 16   GLUCOSE mg/dL 118* 95     Imaging Results (Last 24 Hours)       Procedure Component Value Units Date/Time    CT Head Without Contrast [[id number]] Collected: 02/12/24 0754     Updated: 02/13/24 1500    Narrative:      CT OF THE BRAIN WITHOUT CONTRAST 02/12/2024     HISTORY: Fell. Head injury.     Axial images were obtained through the brain without intravenous  contrast.     There is a subtle area of low density in the anterior rightward aspect  of the cuco such as on axial images 19 and 20. This is not clearly seen  on the previous study of 12/10/2023.  This could represent subtle area of  subacute to chronic infarct but could also be artifact. Another tiny  focus of low density is seen in the left cerebellar hemisphere which  could represent tiny subacute to old infarct. Brain parenchyma and  ventricular system otherwise appear within normal limits. No mass  lesions, midline shift, intracranial hemorrhage or evidence of  infarction is demonstrated. There is some distal internal carotid and  distal vertebral artery calcification.     No bony abnormalities are seen.       Impression:      1. Subtle area of low density in the anterior rightward aspect of the  cuco as described. This is not seen on the previous study of 12/10/2023.  While this could be artifact small area of subacute to chronic infarct  is not entirely excluded. Another small approximately 7 mm 8 mm  low-density focus is seen in the left cerebellar hemisphere. If further  evaluation of these areas is clinically indicated, followup would be  helpful.  2. No acute posttraumatic abnormality is seen.     Radiation dose reduction techniques were utilized, including automated  exposure control and exposure modulation based on body size.        This report was finalized on 2/13/2024 2:57 PM by Dr. Salvador Arita M.D on Workstation: [id number]               I have reviewed the medications.  ---------------------------------------------------------------------------------------------  Assessment & Plan   Assessment/Problem List    Closed head injury    Diverticulosis    Lepe's esophagus without dysplasia    GERD (gastroesophageal reflux disease)      Plan:    Dizziness  Headache  Back pain  History of CVA  -MRI brain and lumbar spine without shows no evidence of acute infarction however demonstrated 5 mm old med inferior left cerebral infarct in the left peak at territory and there is 4 mm old lacunar infarct in the inferior right thalamus and minimal small vessel disease cerebral white matter.  -CTA  head and neck shows no evidence of stenosis  -Fall precautions  -Neuro checks every 4 hours  -MRI lumbar spine without shows mild lumbar spondylosis with minimal canal and foraminal narrowing at L4-5 and mild left foraminal narrowing L5-S1   -Neurology saw and evaluated the patient, recommended blood pressure control greater than 120 but less than 150, avoid dehydration, follow-up in the office in a couple of weeks  -Discussed with patient regarding neuropsychiatric issues however patient declined inpatient evaluation will follow-up as outpatient, ambulatory referral placed     Rectal bleeding  -hgb 12.3-12.1   -GI following, EGD and colonoscopy completed that shows evidence of hiatal hernia, Lepe's disease, diverticulosis without diverticulitis and nonbleeding internal hemorrhoids  -Continue Protonix 40 mg daily  -Patient will have follow-up appointment with GI in 12 weeks  -Pathology results will be called to the patient within 2 weeks     Hypertension  Vitals every 4 hours  -Continue home medications    Disposition: Home    Follow-up after Discharge: GI, neurology    This note will serve as a discharge summary    Flory Williamson, APRN 02/14/24 01:51 EST    I have worn appropriate PPE during this patient encounter, sanitized my hands both with entering and exiting patient's room.    45 minutes has been spent by [facility] providers in the care of this patient while under observation status

## 2024-02-14 NOTE — DISCHARGE INSTRUCTIONS
Take Robaxin-750 milligrams tablets 4 times daily as needed for muscle spasms.  GI will contact you regarding biopsy results from your endoscopy within 14 days, if you have not heard from them please call the number to their office is provided in your discharge paperwork.  Follow-up with your primary care doctor in 1 to 2 weeks.  Follow-up with psychiatry, ambulatory referral has been placed.    Return to the emergency department with worsening symptoms, uncontrolled pain, inability to tolerate oral liquids, fever greater than 101°F not controlled by Tylenol or as needed with emergent concerns.

## 2024-02-14 NOTE — PLAN OF CARE
Goal Outcome Evaluation: patient has been cleared for discharge.  Following up outpatient with GI and psych.  Script sent for robaxin

## 2024-02-14 NOTE — PLAN OF CARE
Problem: Adult Inpatient Plan of Care  Goal: Plan of Care Review  Outcome: Ongoing, Progressing  Flowsheets (Taken 2/14/2024 0143)  Progress: improving  Plan of Care Reviewed With: patient  Goal: Patient-Specific Goal (Individualized)  Outcome: Ongoing, Progressing  Goal: Absence of Hospital-Acquired Illness or Injury  Outcome: Ongoing, Progressing  Intervention: Identify and Manage Fall Risk  Recent Flowsheet Documentation  Taken 2/14/2024 0000 by Logan Hoffman RN  Safety Promotion/Fall Prevention: safety round/check completed  Taken 2/13/2024 2200 by Logan Hoffman RN  Safety Promotion/Fall Prevention:   activity supervised   clutter free environment maintained   fall prevention program maintained   nonskid shoes/slippers when out of bed   safety round/check completed   room organization consistent  Taken 2/13/2024 1945 by Logan Hoffman RN  Safety Promotion/Fall Prevention:   activity supervised   safety round/check completed  Intervention: Prevent Skin Injury  Recent Flowsheet Documentation  Taken 2/14/2024 0000 by Logan Hoffman RN  Body Position: position changed independently  Taken 2/13/2024 2200 by Logan Hoffman RN  Body Position: position changed independently  Taken 2/13/2024 1945 by Logan Hoffman RN  Body Position: position changed independently  Intervention: Prevent and Manage VTE (Venous Thromboembolism) Risk  Recent Flowsheet Documentation  Taken 2/14/2024 0000 by Logan Hoffman RN  Activity Management: ambulated to bathroom  Taken 2/13/2024 2200 by Logan Hoffman RN  Activity Management: ambulated in room  Taken 2/13/2024 1945 by Logan Hoffman RN  Activity Management: ambulated to bathroom  Intervention: Prevent Infection  Recent Flowsheet Documentation  Taken 2/14/2024 0000 by Logan Hoffman RN  Infection Prevention:   single patient room provided   rest/sleep promoted  Taken 2/13/2024 2200 by Logan Hoffman RN  Infection Prevention:   rest/sleep promoted   hand hygiene promoted   single patient room provided    personal protective equipment utilized  Taken 2/13/2024 1945 by Logan Hoffman RN  Infection Prevention:   rest/sleep promoted   single patient room provided  Goal: Optimal Comfort and Wellbeing  Outcome: Ongoing, Progressing  Intervention: Monitor Pain and Promote Comfort  Recent Flowsheet Documentation  Taken 2/14/2024 0000 by Logan Hoffman RN  Pain Management Interventions: see MAR  Taken 2/13/2024 1945 by Logan Hoffman RN  Pain Management Interventions: see MAR  Intervention: Provide Person-Centered Care  Recent Flowsheet Documentation  Taken 2/13/2024 1945 by Logan Hoffman RN  Trust Relationship/Rapport:   care explained   choices provided  Goal: Readiness for Transition of Care  Outcome: Ongoing, Progressing     Problem: Fall Injury Risk  Goal: Absence of Fall and Fall-Related Injury  Outcome: Ongoing, Progressing  Intervention: Identify and Manage Contributors  Recent Flowsheet Documentation  Taken 2/14/2024 0000 by Logan Hoffman RN  Medication Review/Management: medications reviewed  Taken 2/13/2024 2200 by Logan Hoffman RN  Medication Review/Management: medications reviewed  Taken 2/13/2024 1945 by Logan Hoffman RN  Medication Review/Management: medications reviewed  Intervention: Promote Injury-Free Environment  Recent Flowsheet Documentation  Taken 2/14/2024 0000 by Logan Hoffman RN  Safety Promotion/Fall Prevention: safety round/check completed  Taken 2/13/2024 2200 by Logan Hoffman RN  Safety Promotion/Fall Prevention:   activity supervised   clutter free environment maintained   fall prevention program maintained   nonskid shoes/slippers when out of bed   safety round/check completed   room organization consistent  Taken 2/13/2024 1945 by Logan Hoffman RN  Safety Promotion/Fall Prevention:   activity supervised   safety round/check completed     Problem: Pain Acute  Goal: Acceptable Pain Control and Functional Ability  Outcome: Ongoing, Progressing  Intervention: Prevent or Manage Pain  Recent Flowsheet  Documentation  Taken 2/14/2024 0000 by Logan Hoffman, RN  Medication Review/Management: medications reviewed  Taken 2/13/2024 2200 by Logan Hoffman, RN  Medication Review/Management: medications reviewed  Taken 2/13/2024 1945 by Logan Hoffman RN  Medication Review/Management: medications reviewed  Intervention: Develop Pain Management Plan  Recent Flowsheet Documentation  Taken 2/14/2024 0000 by Logan Hoffman RN  Pain Management Interventions: see MAR  Taken 2/13/2024 1945 by Logan Hoffman RN  Pain Management Interventions: see MAR     Problem: Cognitive Impairment (Anxiety Signs/Symptoms)  Goal: Optimized Cognitive Function (Anxiety Signs/Symptoms)  Outcome: Ongoing, Progressing     Problem: Mood Impairment (Anxiety Signs/Symptoms)  Goal: Improved Mood Symptoms (Anxiety Signs/Symptoms)  Outcome: Ongoing, Progressing   Goal Outcome Evaluation:  Plan of Care Reviewed With: patient        Progress: improving

## 2024-02-14 NOTE — PROGRESS NOTES
EMANI JOHNSTON Attestation Note     I supervised care provided by the midlevel provider. We have discussed this patient's history, physical exam, and treatment plan. I have reviewed the midlevel provider's note and I agree with the midlevel provider's findings and plan of care.   SHARED VISIT: This visit was performed by BOTH a physician and an APC. The substantive portion of the medical decision making was performed by this attesting physician who made or approved the management plan and takes responsibility for patient management. All studies in the APC note (if performed) were independently interpreted by me.   I have personally had a face to face encounter with the patient.   My personal findings are documented below:      Subjective  Pt is a 58 y.o. male admitted from Emergency Department for evaluation and treatment of fall at home with head injury and low back pain.  Patient with history of multiple strokes and is anticoagulated on Brilinta.  Neurology workup relatively unremarkable with patient being seen by GI for noted rectal bleeding.    Physical Exam  GENERAL: Alert and in NAD, Vitals reviewed  HENT: nares patent  EYES: no scleral icterus  CV: regular rhythm, regular rate  RESPIRATORY: normal effort  ABDOMEN: soft  MUSCULOSKELETAL: no deformity  NEURO: Strength sensation and coordination are grossly intact.  Speech and mentation are unremarkable  SKIN: warm, dry    Assessment/Plan  I discussed tx and evaluation of this patient with ZHOU Mccray.  Neuro workup fairly unremarkable and they have signed off.  Continue Brilinta with history of vertebrobasilar insufficiency.  Patient undergoing EGD and colonoscopy for workup of rectal bleeding per GI service.  Anticipate discharge later today after colonoscopy.

## 2024-02-15 ENCOUNTER — TRANSITIONAL CARE MANAGEMENT TELEPHONE ENCOUNTER (OUTPATIENT)
Dept: CALL CENTER | Facility: HOSPITAL | Age: 59
End: 2024-02-15
Payer: MEDICAID

## 2024-02-15 LAB
LAB AP CASE REPORT: NORMAL
PATH REPORT.FINAL DX SPEC: NORMAL
PATH REPORT.GROSS SPEC: NORMAL

## 2024-02-20 ENCOUNTER — OFFICE VISIT (OUTPATIENT)
Dept: SPORTS MEDICINE | Facility: CLINIC | Age: 59
End: 2024-02-20
Payer: MEDICAID

## 2024-02-20 ENCOUNTER — TELEPHONE (OUTPATIENT)
Dept: GASTROENTEROLOGY | Facility: CLINIC | Age: 59
End: 2024-02-20
Payer: MEDICAID

## 2024-02-20 VITALS
DIASTOLIC BLOOD PRESSURE: 70 MMHG | WEIGHT: 228 LBS | OXYGEN SATURATION: 97 % | HEIGHT: 74 IN | HEART RATE: 65 BPM | BODY MASS INDEX: 29.26 KG/M2 | RESPIRATION RATE: 16 BRPM | SYSTOLIC BLOOD PRESSURE: 112 MMHG

## 2024-02-20 DIAGNOSIS — F41.8 MIXED ANXIETY DEPRESSIVE DISORDER: ICD-10-CM

## 2024-02-20 DIAGNOSIS — T75.3XXA MOTION SICKNESS, INITIAL ENCOUNTER: ICD-10-CM

## 2024-02-20 DIAGNOSIS — I10 ESSENTIAL HYPERTENSION: ICD-10-CM

## 2024-02-20 DIAGNOSIS — K22.70 BARRETT'S ESOPHAGUS WITHOUT DYSPLASIA: ICD-10-CM

## 2024-02-20 DIAGNOSIS — Z86.73 HISTORY OF CVA (CEREBROVASCULAR ACCIDENT): Primary | ICD-10-CM

## 2024-02-20 PROBLEM — S06.0XAA HEAD INJURY, CLOSED, WITH CONCUSSION: Status: RESOLVED | Noted: 2023-10-27 | Resolved: 2024-02-20

## 2024-02-20 PROBLEM — S09.90XA CLOSED HEAD INJURY: Status: RESOLVED | Noted: 2024-02-12 | Resolved: 2024-02-20

## 2024-02-20 PROBLEM — I63.9 CVA (CEREBRAL VASCULAR ACCIDENT): Status: RESOLVED | Noted: 2023-08-22 | Resolved: 2024-02-20

## 2024-02-20 PROBLEM — R55 RECURRENT SYNCOPE: Status: RESOLVED | Noted: 2024-01-19 | Resolved: 2024-02-20

## 2024-02-20 PROBLEM — I63.9 ACUTE CVA (CEREBROVASCULAR ACCIDENT): Status: RESOLVED | Noted: 2023-10-23 | Resolved: 2024-02-20
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
## 2024-02-20 PROBLEM — S09.90XA CHI (CLOSED HEAD INJURY), INITIAL ENCOUNTER: Status: RESOLVED | Noted: 2023-12-10 | Resolved: 2024-02-20

## 2024-02-20 PROBLEM — R27.0 ATAXIA: Status: RESOLVED | Noted: 2023-08-19 | Resolved: 2024-02-20

## 2024-02-20 PROBLEM — S06.0XAA CONCUSSION: Status: RESOLVED | Noted: 2023-10-27 | Resolved: 2024-02-20

## 2024-02-20 PROBLEM — I16.1 HYPERTENSIVE EMERGENCY: Status: RESOLVED | Noted: 2023-08-19 | Resolved: 2024-02-20

## 2024-02-20 NOTE — PROGRESS NOTES
Lepe's esophagus no dysplasia  Continue PPI  Office visit GILLIAN 6 months  EGD recall 3 years  Colonoscopy recall 5 years

## 2024-02-20 NOTE — TELEPHONE ENCOUNTER
Call to patient and left voice mail for them to call office at [phone] option 1 for results    Results sent via [employer]       ----- Message from Albert Gallo MD sent at 2/20/2024 12:10 PM EST -----  Lepe's esophagus no dysplasia  Continue PPI  Office visit GILLIAN 6 months  EGD recall 3 years  Colonoscopy recall 5 years

## 2024-02-20 NOTE — PROGRESS NOTES
"Flako is a 58 y.o. year old male presents to [facility]    Chief Complaint   Patient presents with    Hospital Follow Up Visit     Hospital f/u eval - admission 02/12/2024-02/14/2024 @ [facility], admission for closed head injury - patient reports visit for stroke and multiple falls - here for further evaluation and treatment        History of Present Illness  Flako is here for follow-up from recent hospital admission.  He unfortunately has suffered CVA recently which left him with some speech deficits though those are improving.  His primary problem at this time has been some memory loss and balance problems.  He has been able to notice his feet more so as time is progressed but he still feels unsteady when he is walking.  He completed speech therapy.  Has follow-up with neurology next week.  He has been following with cardiology since his previous hospital admission and his medications have been optimized to control his blood pressure.  He does seem frustrated given his current physical condition as he has been unemployed though desires to seek employment.  He has been taking medications as prescribed.    Lepe's esophagus has been flaring up.  He has been taking Protonix daily.  He did have some bloody bowel movements prior to hospital admission but those resolved.  Had EGD, colonoscopy performed.  EGD confirmed Lepe's esophagus.    ED to Hosp-Admission (Discharged) with Bladimir Bliss MD (02/12/2024)     I have reviewed the patient's medical, family, and social history in detail and updated the computerized patient record.    /70 (BP Location: Right arm, Patient Position: Sitting, Cuff Size: Large Adult)   Pulse 65   Resp 16   Ht 188 cm (74.02\")   Wt 103 kg (228 lb)   SpO2 97%   BMI 29.26 kg/m²      Physical Exam    Vital signs reviewed.   General: No acute distress.  Eyes: conjunctiva clear; pupils equally round and reactive  ENT: external ears " atraumatic  CV: no peripheral edema; S1, S2, no murmurs rubs or gallops  Resp: normal respiratory effort, no use of accessory muscles; CTA B/L  Skin: no rashes or wounds; normal turgor  Psych: mood and affect appropriate; recent and remote memory intact  Neuro: sensation to light touch intact; EOMI, no nystagmus.  2+ DTR C5, C6, L4, S1 bilateral      CT Angiogram Head (02/12/2024 18:26)   CT Angiogram Neck (02/12/2024 18:26)   MRI Lumbar Spine Without Contrast (02/12/2024 12:00)   MRI Brain Without Contrast (02/12/2024 12:00)     All above studies reviewed independently.  No acute processes noted.  Regarding his brain MRI, old inferior left cerebellar infarct within the left PICA territory as well as old lacunar infarct within the right inferior thalamus.  Chronic small vessel disease.           Diagnoses and all orders for this visit:    History of CVA (cerebrovascular accident)  -     Ambulatory Referral to Physical Therapy Vestibular    Motion sickness, initial encounter  -     Ambulatory Referral to Physical Therapy Vestibular    Essential hypertension    Mixed anxiety depressive disorder    Lepe's esophagus without dysplasia      Reviewed hospitalization, hospital records.  His blood pressure has been optimized and is at goal.  He has been taking medications as prescribed.  Regarding his balance disorders, I explained that this could be sequela of his recent CVA.  He is seeking help for vestibular rehab and I will place referral for this.  He will keep follow-ups with specialist as previously scheduled.  I will see him for follow-up in 3 months.      I spent 45 minutes caring for Flako on this date of service. This time includes time spent by me in the following activities:preparing for the visit, reviewing tests, obtaining and/or reviewing a separately obtained history, performing a medically appropriate examination and/or evaluation , counseling and educating the patient/family/caregiver, documenting  information in the medical record, and independently interpreting results and communicating that information with the patient/family/caregiver    Follow Up     Return in about 3 months (around 5/20/2024) for Recheck h/o CVA, HTN.    Patient was given instructions and counseling regarding his condition or for health maintenance advice. Please see specific information pulled into the AVS if appropriate.     EMR Dragon/Transcription disclaimer:    Much of this encounter note is an electronic transcription/translation of spoken language to printed text.  The electronic translation of spoken language may permit erroneous, or at times, nonsensical words or phrases to be inadvertently transcribed.  Although I have reviewed the note for such errors some may still exist.

## 2024-02-26 ENCOUNTER — OFFICE VISIT (OUTPATIENT)
Dept: NEUROLOGY | Facility: CLINIC | Age: 59
End: 2024-02-26
Payer: MEDICAID

## 2024-02-26 VITALS
HEART RATE: 73 BPM | HEIGHT: 74 IN | DIASTOLIC BLOOD PRESSURE: 76 MMHG | SYSTOLIC BLOOD PRESSURE: 120 MMHG | WEIGHT: 235.8 LBS | BODY MASS INDEX: 30.26 KG/M2 | OXYGEN SATURATION: 96 %

## 2024-02-26 DIAGNOSIS — F41.8 ANXIETY ABOUT HEALTH: ICD-10-CM

## 2024-02-26 DIAGNOSIS — G47.33 OBSTRUCTIVE SLEEP APNEA: ICD-10-CM

## 2024-02-26 DIAGNOSIS — I69.393 ATAXIA DUE TO OLD CEREBELLAR INFARCTION: ICD-10-CM

## 2024-02-26 DIAGNOSIS — I65.02 VERTEBRAL ARTERY STENOSIS, LEFT: ICD-10-CM

## 2024-02-26 DIAGNOSIS — R41.3 MEMORY LOSS: ICD-10-CM

## 2024-02-26 DIAGNOSIS — F10.21 HISTORY OF ALCOHOL DEPENDENCE: ICD-10-CM

## 2024-02-26 DIAGNOSIS — I69.30 HISTORY OF CVA WITH RESIDUAL DEFICIT: Primary | ICD-10-CM

## 2024-02-26 DIAGNOSIS — R42 DIZZINESS: ICD-10-CM

## 2024-02-26 DIAGNOSIS — I10 HYPERTENSION, UNSPECIFIED TYPE: ICD-10-CM

## 2024-02-26 DIAGNOSIS — Z91.89 AT RISK FOR OBSTRUCTIVE SLEEP APNEA: ICD-10-CM

## 2024-02-26 DIAGNOSIS — E78.5 DYSLIPIDEMIA, GOAL LDL BELOW 70: ICD-10-CM

## 2024-02-26 DIAGNOSIS — Z09 HOSPITAL DISCHARGE FOLLOW-UP: ICD-10-CM

## 2024-02-26 PROBLEM — R45.89 ANXIETY ABOUT HEALTH: Status: ACTIVE | Noted: 2024-02-26

## 2024-02-26 PROCEDURE — 99215 OFFICE O/P EST HI 40 MIN: CPT | Performed by: NURSE PRACTITIONER

## 2024-02-26 PROCEDURE — 1159F MED LIST DOCD IN RCRD: CPT | Performed by: NURSE PRACTITIONER

## 2024-02-26 PROCEDURE — 3078F DIAST BP <80 MM HG: CPT | Performed by: NURSE PRACTITIONER

## 2024-02-26 PROCEDURE — 3074F SYST BP LT 130 MM HG: CPT | Performed by: NURSE PRACTITIONER

## 2024-02-26 PROCEDURE — 1160F RVW MEDS BY RX/DR IN RCRD: CPT | Performed by: NURSE PRACTITIONER

## 2024-02-26 RX ORDER — CHOLECALCIFEROL (VITAMIN D3) 125 MCG
500 CAPSULE ORAL DAILY
Qty: 90 TABLET | Refills: 0 | Status: SHIPPED | OUTPATIENT
Start: 2024-02-26

## 2024-02-26 RX ORDER — ASPIRIN 81 MG/1
81 TABLET ORAL DAILY
Qty: 90 TABLET | Refills: 1 | Status: SHIPPED | OUTPATIENT
Start: 2024-02-26

## 2024-02-26 RX ORDER — ROSUVASTATIN CALCIUM 20 MG/1
40 TABLET, COATED ORAL NIGHTLY
Qty: 30 TABLET | Refills: 11 | Status: SHIPPED | OUTPATIENT
Start: 2024-02-26 | End: 2025-02-25

## 2024-02-26 RX ORDER — ASPIRIN 325 MG
325 TABLET ORAL DAILY
Qty: 100 TABLET | Refills: 0 | Status: SHIPPED | OUTPATIENT
Start: 2024-02-26 | End: 2024-02-26

## 2024-02-26 NOTE — LETTER
2024       No Recipients    Patient: Flako Coreas   YOB: 1965   Date of Visit: 2024     Dear Akash Rodriguez Jr., DO:       Thank you for referring Flako Coreas to me for evaluation. Below are the relevant portions of my assessment and plan of care.    If you have questions, please do not hesitate to call me. I look forward to following Flako along with you.         Sincerely,        JANEEN Brooks        CC:   No Recipients    Latisha Lindsey APRN  [phone]  Sign when Signing Visit  DOS: 2024  NAME: Flako Coreas   : 1965  PCP: Akash Rodriguez Jr.,     Chief Complaint   Patient presents with   • Dizziness     F/u      Referring MD: Ibis Luther APRN    Neurological Problem:  58 y.o. right-handed male with a Hx of GERD, diverticulitis, ADHD who presents today for a hospital follow-up for stroke and dizziness.  Problem is new to me.  He is not accompanied by any family.  History is provided by the patient and review of records as summarized below.    Interval History:  Mr. Coreas initially presented to [facility] in 2023 with complaints of acute onset dizziness, gait imbalance, and falling towards the right.  He was found to be significantly hypertensive at the time with /141.  He had an MRI brain without that revealed several new left cerebellar ischemic infarcts within the left PICA territory and chronic right thalamic lacunar disease.  His CTA head and neck at that time revealed a dominant right vertebral and hypoplastic left vertebral with a possible occlusion versus severe stenosis.  His 2D echo revealed a mildly dilated LA, negative saline test, EF of 66 to 70%, no evidence of LV thrombus, all LV wall segments contracted normally, no significant AV stenosis or MVR. He was treated with DAPT with ASA and Plavix with plans to continue for 21 days and then transition to a single antiplatelet therapy along  with a high-dose statin.  He then represented back a few days after discharge again complaining of dizziness.  Repeat MRI brain without revealed new left cerebellar infarcts.  A MEGHANN was performed which revealed a mildly dilated LA cavity, no evidence of PFO, saline test were negative, no LA appendage thrombus, no LV thrombus, EF was >70%, very mild plaque in the distal descending thoracic aorta but no plaques in the aortic arch or ascending aorta.  Zio patch was recommended at discharge and this did not reveal any evidence of atrial fibrillation or atrial flutter.  Antiphospholipid syndrome antibodies were sent and were negative.  He had a P2Y12 level checked that was therapeutic at 171.  He was recommended to continue DAPT for 21 days and then transition to aspirin monotherapy.  He was referred to sleep medicine for sleep study evaluation.He was recommended to decreasing alcohol intake as he had reported drinking 3-4 beers daily.  He remains severely ataxic.  Rehab was recommended.    He then presented back to the hospital on 9/2 with complaints of dizziness and ataxia with associated photophobia and headache.  He had a repeat MRI brain that did not show any new infarcts.  Repeat vessel imaging again showed possible left vert occlusion versus severe stenosis with a dominant right vert and good flow to the basilar. Symptoms were felt to be exacerbation of his recent cerebellar stroke versus symptomatic left vertebral occlusion/critical stenosis versus vestibular migraine. He was recommended to continue DAPT and statin therapy as well as regularly monitoring his BP.  He was initiated on supplements with magnesium and riboflavin for suspected component of vestibular migraine as well.  Of note, he was noted to have severe anxiety at the time and was on Viibryd.  Previously and in August we recommended adding Lexapro due to concern for severe anxiety at that time as well.    He presented back to the hospital On 10/23  with complaints of sudden onset of left facial and perioral numbness and tingling and left hand tingling.  He reported baseline diplopia since initial stroke in August although there was no mention of this in our notes at the time.  He was on aspirin monotherapy.  His CT perfusion showed a larger deficit then prior perfusion in August but unchanged CTA with a critical stenosis versus occlusion at the vertebral.  He was offered TNK however he declined.  He was loaded with Plavix.  MRI brain without revealed no evidence of a new infarct, left cerebellar infarcts again noted as well as chronic right thalamic infarct.  He had a P2Y12 drawn which revealed a level of 212 therefore he was initiated on Brilinta and aspirin was restarted.  He was instructed to continue DAPT for 90 days and then Brilinta monotherapy.  He then represented back to the hospital 4 hours after discharge on 10/27 with complaints of suffering a fall.  He was diagnosed with a head injury/concussion secondary to vertigo from his left cerebellar infarct.  He was recommended to undergo rehab.    Most recently he presented to the hospital on 2/13 with complaints of suffering a mechanical fall and developing a headache and back discomfort afterwards.  He had a MRI brain without that did not reveal any new evidence of restricted diffusion.  His CTA head and neck revealed known left vertebral critical stenosis versus occlusion.  He was on ASA 81 mg at the time.  He was seen by Dr. Murphy with our service who noted that he still believe vascular risk factor control medical management was the best option considering his MRI did not reveal any new strokes.  Given that the patient was complaining of some rectal bleeding he recommended holding off on adding any additional antiplatelet therapy and continuing aspirin and Lipitor.  He documented 325 mg however the patient states he was taking 81 mg.  He recommended considering a Repatha trial due to no  significant reduction in his LDL level.  Lipoprotein A level was drawn and was elevated at 209.9.  He recommended if the patient developed new posterior circulation strokes in the future that he should be evaluated by endovascular surgery for possible candidate for stenting.  He recommended SBP goal of 120-150 and avoiding dehydration.  Sleep study evaluation was again recommended.  Neuropsychiatry evaluation during inpatient stay was also recommended, patient opted to see outpatient neuropsychiatry.    He presents today and reports he continues with dizziness and off-balance feelings especially when he is very tired.  Also has diplopia.  He continues on ASA 81 mg.  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  Biopsies were sent and pathology results are pending.  GI recommended repeat colonoscopy in 5 years, repeat EGD in 3 years, and use of Protonix was recommended.  BPs have been normalized.  Today was 120/76.  Feels his memory is poor.  Still having trouble with sleep.    Review of Systems:        Review of Systems   Constitutional:  Positive for fatigue. Negative for unexpected weight change.   HENT:  Positive for tinnitus. Negative for ear pain, hearing loss and nosebleeds.    Eyes:  Positive for visual disturbance (double vision). Negative for photophobia and pain.   Respiratory:  Negative for chest tightness, shortness of breath and wheezing.    Cardiovascular:  Negative for chest pain, palpitations and leg swelling.   Musculoskeletal:  Positive for gait problem (balance problem > 2 falls).   Allergic/Immunologic: Negative for food allergies.   Neurological:  Positive for dizziness, syncope and weakness (general). Negative for  "numbness and headaches.   Psychiatric/Behavioral:  Positive for confusion, decreased concentration and sleep disturbance. The patient is not nervous/anxious.        \"The following portions of the patient's history were reviewed and updated as appropriate: allergies, current medications, past family history, past medical history, past social history, past surgical history and problem list.\"    Review and Interpretation of Imaging as described in interval history.    Laboratory Results:             Lab Results   Component Value Date    HGBA1C 5.20 02/12/2024     Lab Results   Component Value Date    CHOL 167 02/12/2024    CHOL 127 10/24/2023    CHOL 117 08/20/2023     Lab Results   Component Value Date    HDL 34 (L) 02/12/2024    HDL 39 (L) 10/24/2023    HDL 34 (L) 08/20/2023     Lab Results   Component Value Date    LDL 96 02/12/2024    LDL 71 10/24/2023    LDL 62 08/20/2023     Lab Results   Component Value Date    TRIG 217 (H) 02/12/2024    TRIG 88 10/24/2023    TRIG 115 08/20/2023     No components found for: \"CHOLHDL\"  Lab Results   Component Value Date    RPR Non Reactive 02/19/2021     Lab Results   Component Value Date    TSH 0.364 08/20/2023     Lab Results   Component Value Date    TOXCEPBS78 370 02/13/2024     Lab Results   Component Value Date    GLUCOSE 105 (H) 02/14/2024    BUN 14 02/14/2024    CREATININE 0.92 02/14/2024    EGFRIFNONA 98 11/05/2018    EGFRIFAFRI >60 08/27/2022    BCR 15.2 02/14/2024    K 3.7 02/14/2024    CO2 24.0 02/14/2024    CALCIUM 9.0 02/14/2024    PROTENTOTREF 7.0 05/11/2022    ALBUMIN 4.4 02/14/2024    LABIL2 1.6 08/27/2022    AST 15 02/14/2024    ALT 21 02/14/2024     Lab Results   Component Value Date    WBC 7.37 02/14/2024    HGB 12.5 (L) 02/14/2024    HCT 38.8 02/14/2024    MCV 82.2 02/14/2024     02/14/2024     Lab Results   Component Value Date    INR 0.99 02/14/2024    INR 1.00 02/12/2024    INR 0.97 12/10/2023    PROTIME 13.2 02/14/2024    PROTIME 13.3 02/12/2024    " PROTIME 13.0 12/10/2023       Physical Examination:   mRS:   General Appearance:   Anxious appearing elderly male, well developed, well nourished, well groomed, alert, and cooperative.  HEENT: Normocephalic. Atraumatic.   Extremities:    No obvious edema.  Respiratory:   Even and unlabored.    Neurological examination:  Higher Integrative  Function: Oriented to year, place, president, and person. Normal registration, recalled 3/3 presidents. Normal language including comprehension, spontaneous speech, reading, writing, naming, and vocabulary. Normal fund of knowledge and higher integrative function.  CN II: Normal visual fields.    CN III IV VI: Extraocular movements are full without nystagmus.   CN V: Normal facial sensation and strength of muscles of mastication.  CN VII: Facial movements are symmetric. No weakness.  CN XI: Sternocleidomastoid and trapezius are normal.  No weakness.  CN XII:   The tongue is midline.  No atrophy or fasciculations.  Motor: Normal muscle strength, bulk and tone in upper and lower extremities.  No fasciculations, rigidity, spasticity, or abnormal movements.  Sensation: Normal to light touch, vibration, temperature, in arms, normal vibratory sensation in legs. Normal graphesthesia   Station and Gait: Moderate to severe ataxia especially with initial standing.  Coordination: Finger to nose test shows no dysmetria.  Rapid alternating movements are normal.        Diagnoses and all orders for this visit:    1. History of CVA with residual deficit (Primary)    2. Vertebral artery stenosis, left    3. 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 Dizziness    5. Hospital discharge follow-up    6. Hypertension, unspecified type    7. Dyslipidemia, goal LDL below 70    8. Anxiety about health    9. Memory loss    10. History of alcohol dependence    11. Obstructive sleep apnea    12. At risk for obstructive sleep apnea  -     Ambulatory Referral to Sleep Medicine    Other orders  -      Discontinue: aspirin (Simon Aspirin) 325 MG tablet; Take 1 tablet by mouth Daily.  Dispense: 100 tablet; Refill: 0  -     aspirin 81 MG EC tablet; Take 1 tablet by mouth Daily.  Dispense: 90 tablet; Refill: 1  -     rosuvastatin (Crestor) 20 MG tablet; Take 2 tablets by mouth Every Night.  Dispense: 30 tablet; Refill: 11  -     vitamin B-12 (CYANOCOBALAMIN) 500 MCG tablet; Take 1 tablet by mouth Daily.  Dispense: 90 tablet; Refill: 0      Plan:     -Continue ASA 81mg, given no new areas of restricted diffusion on recent MRI.  Path results for Lepe's disease are pending.  -Need to monitor PPI use with antiplatelets, okay to continue Protonix but recommend avoiding Nexium  -Lipoprotein A level was significantly elevated, recent LDL was 96. Needs to discuss with PCP about Repatha trial. Could try and switch to Crestor 40mg in the meantime.  -He needs sleep study evaluation, will place sleep medicine referral  -Recommend neuropsychiatry referral, discussed with the patient.  We will hold off until his follow-up with Dr. Leon. Remains on Viibryd.  -Will start him on B12 replacement with 500mcg daily given low normal B12. Needs level rechecked in 3 months  -Monitor BP regularly, discussed SBP goal 120-140.  -Keep well-hydrated and avoid hypotension  -Agree with vestibular therapy placed by PCP, discussed with patient changing positions slowly.  -Encourage regular physical activity as tolerated  -Falls precautions discussed extensively  Maintain well-balanced diet, recommend the Mediterranean diet if not on diabetic diet   Goal LDL <70    Serum glucose < 140, A1C 7.0 or <   Call 911 for stroke any stroke symptoms   Follow-up with Dr. Leon in 3 months    MDM  Reviewed: previous chart and vitals  Reviewed previous: labs, MRI and CT scan  Interpretation: labs, MRI and CT scan      I spent 50 minutes caring for patient on todays date of service. This time includes time spent by me in the following activities:  obtaining and reviewing a separately obtained history, performing a medically appropriate examination and evaluation, counseling and educating the patient on diagnosis and treatment, ordering medications, referring and communicating with other health care professionals and documenting information in the medical record.        Latisha Lindsey, APRN

## 2024-03-07 ENCOUNTER — TELEPHONE (OUTPATIENT)
Age: 59
End: 2024-03-07

## 2024-03-07 ENCOUNTER — HOSPITAL ENCOUNTER (OUTPATIENT)
Dept: SLEEP MEDICINE | Facility: HOSPITAL | Age: 59
End: 2024-03-07
Payer: MEDICAID

## 2024-03-07 VITALS — HEIGHT: 74 IN | BODY MASS INDEX: 30.16 KG/M2 | WEIGHT: 235 LBS

## 2024-03-07 DIAGNOSIS — I1A.0 RESISTANT HYPERTENSION: ICD-10-CM

## 2024-03-07 DIAGNOSIS — G47.09 OTHER INSOMNIA: ICD-10-CM

## 2024-03-07 DIAGNOSIS — I63.9 ACUTE CVA (CEREBROVASCULAR ACCIDENT): ICD-10-CM

## 2024-03-07 PROCEDURE — 95810 POLYSOM 6/> YRS 4/> PARAM: CPT

## 2024-03-07 NOTE — TELEPHONE ENCOUNTER
Pt with a MDT LOOP had a remote report come through today that shows AF events on 3/6 and 3/7 with the longest lasting 16 minutes and avg V rates 140s-150s bpm. The pt is not on AC and I do not see a hx of AF. He does have a hx of CVA. I have included a strip below for your review. AT/AF burden on carelink states 0.8% from 2/26-3/7.     Kindly,   Debbie Vaca

## 2024-03-07 NOTE — TELEPHONE ENCOUNTER
Thank you.  If the longest episode was 16 minutes I think we can keep monitoring for now.  Can we please arrange for patient to see myself or Wayne in the next 1 to 2 weeks to check-in?    The decision to place on a blood thinner is not an easy one since he has recurrent falls and possible syncopal episodes and he could injure himself on a blood thinner, and I think he would need a longer A-fib episode to cause it embolic CVA so I think were okay to hold off on blood thinner/DOAC right now.  Thank you very much for the help.

## 2024-03-08 ENCOUNTER — DOCUMENTATION (OUTPATIENT)
Age: 59
End: 2024-03-08
Payer: MEDICAID

## 2024-03-08 NOTE — TELEPHONE ENCOUNTER
That sounds good. Scheduling team, could you please arrange for pt to see MD Taylor or Wayne Crystal in 1-2 weeks?   Kindly,   Meg Schotanus Device RN

## 2024-03-08 NOTE — PROGRESS NOTES
Pt lvm on symptoms he had during the duration of the loop report that occurred on 3/7. Pt stated he felt like his mind was blank for about 30 seconds. Called pt back and lvm informing to [facility] with EK or SDC APRN in 1-2 weeks.

## 2024-03-11 NOTE — TELEPHONE ENCOUNTER
3/11/24    I called and left a voicemail for the patient about scheduling a follow up.    Thanks  Danni

## 2024-03-12 DIAGNOSIS — G47.33 OBSTRUCTIVE SLEEP APNEA: Primary | ICD-10-CM

## 2024-03-13 ENCOUNTER — TELEPHONE (OUTPATIENT)
Dept: SLEEP MEDICINE | Facility: HOSPITAL | Age: 59
End: 2024-03-13
Payer: MEDICAID

## 2024-03-13 NOTE — TELEPHONE ENCOUNTER
Lm on pts vm about results and faxed order to [facility] for set up. Pt will need to schedule a f/u for compliance.

## 2024-03-14 ENCOUNTER — DOCUMENTATION (OUTPATIENT)
Age: 59
End: 2024-03-14
Payer: MEDICAID

## 2024-03-14 NOTE — PROGRESS NOTES
Called patient to discuss over the phone.  He has had multiple episodes of dizziness and lightheadedness with subsequent blacking out whenever he gets up and starts walking around.  He is also had a few episodes of dizziness described as tunnel vision while he was just sitting down.    I checked in with the device clinic and there is no correlating arrhythmia to his symptoms.  I encouraged him to stop his hydrochlorothiazide, increase fluids and start wearing compression stockings.  We will need to see him in 1 to 2 weeks in the office to reevaluate his symptoms further.  He is also following with neurology

## 2024-03-15 ENCOUNTER — APPOINTMENT (OUTPATIENT)
Dept: GENERAL RADIOLOGY | Facility: HOSPITAL | Age: 59
End: 2024-03-15
Payer: MEDICAID

## 2024-03-15 ENCOUNTER — APPOINTMENT (OUTPATIENT)
Dept: CT IMAGING | Facility: HOSPITAL | Age: 59
End: 2024-03-15
Payer: MEDICAID

## 2024-03-15 ENCOUNTER — HOSPITAL ENCOUNTER (OUTPATIENT)
Facility: HOSPITAL | Age: 59
Setting detail: OBSERVATION
Discharge: HOME | End: 2024-03-16
Attending: EMERGENCY MEDICINE | Admitting: HOSPITALIST
Payer: MEDICAID

## 2024-03-15 ENCOUNTER — TELEPHONE (OUTPATIENT)
Age: 59
End: 2024-03-15
Payer: MEDICAID

## 2024-03-15 DIAGNOSIS — R55 SYNCOPE, UNSPECIFIED SYNCOPE TYPE: Primary | ICD-10-CM

## 2024-03-15 DIAGNOSIS — I69.30 HISTORY OF CVA WITH RESIDUAL DEFICIT: Primary | ICD-10-CM

## 2024-03-15 DIAGNOSIS — N17.9 AKI (ACUTE KIDNEY INJURY): ICD-10-CM

## 2024-03-15 DIAGNOSIS — S00.81XA ABRASION OF FACE, INITIAL ENCOUNTER: ICD-10-CM

## 2024-03-15 DIAGNOSIS — S09.90XA INJURY OF HEAD, INITIAL ENCOUNTER: ICD-10-CM

## 2024-03-15 DIAGNOSIS — G47.10 HYPERSOMNOLENCE: ICD-10-CM

## 2024-03-15 DIAGNOSIS — F41.8 ANXIETY ABOUT HEALTH: ICD-10-CM

## 2024-03-15 DIAGNOSIS — I63.9 CEREBELLAR STROKE: ICD-10-CM

## 2024-03-15 LAB
ALBUMIN SERPL-MCNC: 4.4 G/DL (ref 3.5–5.2)
ALBUMIN/GLOB SERPL: 1.8 G/DL
ALP SERPL-CCNC: 79 U/L (ref 39–117)
ALT SERPL W P-5'-P-CCNC: 22 U/L (ref 1–41)
ANION GAP SERPL CALCULATED.3IONS-SCNC: 12 MMOL/L (ref 5–15)
APTT PPP: 27.8 SECONDS (ref 22.7–35.4)
AST SERPL-CCNC: 14 U/L (ref 1–40)
BASOPHILS # BLD AUTO: 0.05 10*3/MM3 (ref 0–0.2)
BASOPHILS NFR BLD AUTO: 0.7 % (ref 0–1.5)
BILIRUB SERPL-MCNC: 0.2 MG/DL (ref 0–1.2)
BUN SERPL-MCNC: 18 MG/DL (ref 6–20)
BUN/CREAT SERPL: 13.7 (ref 7–25)
CALCIUM SPEC-SCNC: 9.1 MG/DL (ref 8.6–10.5)
CHLORIDE SERPL-SCNC: 110 MMOL/L (ref 98–107)
CO2 SERPL-SCNC: 21 MMOL/L (ref 22–29)
CREAT SERPL-MCNC: 1.31 MG/DL (ref 0.76–1.27)
D-LACTATE SERPL-SCNC: 1.5 MMOL/L (ref 0.5–2)
DEPRECATED RDW RBC AUTO: 39.3 FL (ref 37–54)
EGFRCR SERPLBLD CKD-EPI 2021: 63.9 ML/MIN/1.73
EOSINOPHIL # BLD AUTO: 0.52 10*3/MM3 (ref 0–0.4)
EOSINOPHIL NFR BLD AUTO: 7.2 % (ref 0.3–6.2)
ERYTHROCYTE [DISTWIDTH] IN BLOOD BY AUTOMATED COUNT: 13.6 % (ref 12.3–15.4)
GLOBULIN UR ELPH-MCNC: 2.5 GM/DL
GLUCOSE SERPL-MCNC: 179 MG/DL (ref 65–99)
HCT VFR BLD AUTO: 36.5 % (ref 37.5–51)
HGB BLD-MCNC: 11.4 G/DL (ref 13–17.7)
HOLD SPECIMEN: NORMAL
HOLD SPECIMEN: NORMAL
IMM GRANULOCYTES # BLD AUTO: 0.02 10*3/MM3 (ref 0–0.05)
IMM GRANULOCYTES NFR BLD AUTO: 0.3 % (ref 0–0.5)
INR PPP: 1.11 (ref 0.9–1.1)
LYMPHOCYTES # BLD AUTO: 1.87 10*3/MM3 (ref 0.7–3.1)
LYMPHOCYTES NFR BLD AUTO: 25.9 % (ref 19.6–45.3)
MCH RBC QN AUTO: 25.1 PG (ref 26.6–33)
MCHC RBC AUTO-ENTMCNC: 31.2 G/DL (ref 31.5–35.7)
MCV RBC AUTO: 80.4 FL (ref 79–97)
MONOCYTES # BLD AUTO: 0.55 10*3/MM3 (ref 0.1–0.9)
MONOCYTES NFR BLD AUTO: 7.6 % (ref 5–12)
NEUTROPHILS NFR BLD AUTO: 4.21 10*3/MM3 (ref 1.7–7)
NEUTROPHILS NFR BLD AUTO: 58.3 % (ref 42.7–76)
NRBC BLD AUTO-RTO: 0 /100 WBC (ref 0–0.2)
PLATELET # BLD AUTO: 295 10*3/MM3 (ref 140–450)
PMV BLD AUTO: 10.1 FL (ref 6–12)
POTASSIUM SERPL-SCNC: 3.6 MMOL/L (ref 3.5–5.2)
PROT SERPL-MCNC: 6.9 G/DL (ref 6–8.5)
PROTHROMBIN TIME: 14.5 SECONDS (ref 11.7–14.2)
QT INTERVAL: 438 MS
QTC INTERVAL: 412 MS
RBC # BLD AUTO: 4.54 10*6/MM3 (ref 4.14–5.8)
SODIUM SERPL-SCNC: 143 MMOL/L (ref 136–145)
TROPONIN T SERPL HS-MCNC: 9 NG/L
WBC NRBC COR # BLD AUTO: 7.22 10*3/MM3 (ref 3.4–10.8)
WHOLE BLOOD HOLD COAG: NORMAL
WHOLE BLOOD HOLD SPECIMEN: NORMAL

## 2024-03-15 PROCEDURE — 93010 ELECTROCARDIOGRAM REPORT: CPT | Performed by: INTERNAL MEDICINE

## 2024-03-15 PROCEDURE — 85730 THROMBOPLASTIN TIME PARTIAL: CPT | Performed by: EMERGENCY MEDICINE

## 2024-03-15 PROCEDURE — 96374 THER/PROPH/DIAG INJ IV PUSH: CPT

## 2024-03-15 PROCEDURE — 96361 HYDRATE IV INFUSION ADD-ON: CPT

## 2024-03-15 PROCEDURE — 84484 ASSAY OF TROPONIN QUANT: CPT | Performed by: EMERGENCY MEDICINE

## 2024-03-15 PROCEDURE — 93005 ELECTROCARDIOGRAM TRACING: CPT | Performed by: EMERGENCY MEDICINE

## 2024-03-15 PROCEDURE — 85610 PROTHROMBIN TIME: CPT | Performed by: EMERGENCY MEDICINE

## 2024-03-15 PROCEDURE — G0378 HOSPITAL OBSERVATION PER HR: HCPCS

## 2024-03-15 PROCEDURE — 71045 X-RAY EXAM CHEST 1 VIEW: CPT

## 2024-03-15 PROCEDURE — 25810000003 LACTATED RINGERS SOLUTION: Performed by: EMERGENCY MEDICINE

## 2024-03-15 PROCEDURE — 25010000002 ONDANSETRON PER 1 MG: Performed by: HOSPITALIST

## 2024-03-15 PROCEDURE — 85025 COMPLETE CBC W/AUTO DIFF WBC: CPT | Performed by: EMERGENCY MEDICINE

## 2024-03-15 PROCEDURE — 25810000003 SODIUM CHLORIDE 0.9 % SOLUTION: Performed by: NURSE PRACTITIONER

## 2024-03-15 PROCEDURE — 99215 OFFICE O/P EST HI 40 MIN: CPT | Performed by: PSYCHIATRY & NEUROLOGY

## 2024-03-15 PROCEDURE — 99254 IP/OBS CNSLTJ NEW/EST MOD 60: CPT | Performed by: NURSE PRACTITIONER

## 2024-03-15 PROCEDURE — 70486 CT MAXILLOFACIAL W/O DYE: CPT

## 2024-03-15 PROCEDURE — 99285 EMERGENCY DEPT VISIT HI MDM: CPT

## 2024-03-15 PROCEDURE — 80053 COMPREHEN METABOLIC PANEL: CPT | Performed by: EMERGENCY MEDICINE

## 2024-03-15 PROCEDURE — 83605 ASSAY OF LACTIC ACID: CPT | Performed by: EMERGENCY MEDICINE

## 2024-03-15 PROCEDURE — 70450 CT HEAD/BRAIN W/O DYE: CPT

## 2024-03-15 RX ORDER — DIPHENOXYLATE HYDROCHLORIDE AND ATROPINE SULFATE 2.5; .025 MG/1; MG/1
1 TABLET ORAL DAILY
Status: DISCONTINUED | OUTPATIENT
Start: 2024-03-15 | End: 2024-03-16 | Stop reason: HOSPADM

## 2024-03-15 RX ORDER — CHOLECALCIFEROL (VITAMIN D3) 125 MCG
5 CAPSULE ORAL NIGHTLY
Status: DISCONTINUED | OUTPATIENT
Start: 2024-03-15 | End: 2024-03-16 | Stop reason: HOSPADM

## 2024-03-15 RX ORDER — METOPROLOL SUCCINATE 25 MG/1
25 TABLET, EXTENDED RELEASE ORAL DAILY
Status: DISCONTINUED | OUTPATIENT
Start: 2024-03-15 | End: 2024-03-16 | Stop reason: HOSPADM

## 2024-03-15 RX ORDER — AMOXICILLIN 250 MG
2 CAPSULE ORAL 2 TIMES DAILY PRN
Status: DISCONTINUED | OUTPATIENT
Start: 2024-03-15 | End: 2024-03-16 | Stop reason: HOSPADM

## 2024-03-15 RX ORDER — BISACODYL 10 MG
10 SUPPOSITORY, RECTAL RECTAL DAILY PRN
Status: DISCONTINUED | OUTPATIENT
Start: 2024-03-15 | End: 2024-03-16 | Stop reason: HOSPADM

## 2024-03-15 RX ORDER — ONDANSETRON 2 MG/ML
4 INJECTION INTRAMUSCULAR; INTRAVENOUS EVERY 6 HOURS PRN
Status: DISCONTINUED | OUTPATIENT
Start: 2024-03-15 | End: 2024-03-16 | Stop reason: HOSPADM

## 2024-03-15 RX ORDER — SODIUM CHLORIDE 9 MG/ML
100 INJECTION, SOLUTION INTRAVENOUS CONTINUOUS
Status: DISCONTINUED | OUTPATIENT
Start: 2024-03-15 | End: 2024-03-15

## 2024-03-15 RX ORDER — CHOLECALCIFEROL (VITAMIN D3) 125 MCG
500 CAPSULE ORAL DAILY
Status: DISCONTINUED | OUTPATIENT
Start: 2024-03-15 | End: 2024-03-16 | Stop reason: HOSPADM

## 2024-03-15 RX ORDER — POLYETHYLENE GLYCOL 3350 17 G/17G
17 POWDER, FOR SOLUTION ORAL DAILY PRN
Status: DISCONTINUED | OUTPATIENT
Start: 2024-03-15 | End: 2024-03-16 | Stop reason: HOSPADM

## 2024-03-15 RX ORDER — ROSUVASTATIN CALCIUM 40 MG/1
40 TABLET, COATED ORAL NIGHTLY
Status: DISCONTINUED | OUTPATIENT
Start: 2024-03-15 | End: 2024-03-16 | Stop reason: HOSPADM

## 2024-03-15 RX ORDER — PANTOPRAZOLE SODIUM 40 MG/1
40 TABLET, DELAYED RELEASE ORAL
Status: DISCONTINUED | OUTPATIENT
Start: 2024-03-15 | End: 2024-03-16 | Stop reason: HOSPADM

## 2024-03-15 RX ORDER — ASPIRIN 81 MG/1
81 TABLET ORAL DAILY
Status: DISCONTINUED | OUTPATIENT
Start: 2024-03-15 | End: 2024-03-16 | Stop reason: HOSPADM

## 2024-03-15 RX ORDER — FOLIC ACID 1 MG/1
1 TABLET ORAL DAILY
Status: DISCONTINUED | OUTPATIENT
Start: 2024-03-15 | End: 2024-03-16 | Stop reason: HOSPADM

## 2024-03-15 RX ORDER — SODIUM CHLORIDE 0.9 % (FLUSH) 0.9 %
10 SYRINGE (ML) INJECTION AS NEEDED
Status: DISCONTINUED | OUTPATIENT
Start: 2024-03-15 | End: 2024-03-16 | Stop reason: HOSPADM

## 2024-03-15 RX ORDER — AMLODIPINE BESYLATE 5 MG/1
5 TABLET ORAL
Status: DISCONTINUED | OUTPATIENT
Start: 2024-03-15 | End: 2024-03-16 | Stop reason: HOSPADM

## 2024-03-15 RX ORDER — VILAZODONE HYDROCHLORIDE 40 MG/1
40 TABLET ORAL DAILY
Status: DISCONTINUED | OUTPATIENT
Start: 2024-03-15 | End: 2024-03-16 | Stop reason: HOSPADM

## 2024-03-15 RX ORDER — SODIUM CHLORIDE 0.9 % (FLUSH) 0.9 %
10 SYRINGE (ML) INJECTION EVERY 12 HOURS SCHEDULED
Status: DISCONTINUED | OUTPATIENT
Start: 2024-03-15 | End: 2024-03-16 | Stop reason: HOSPADM

## 2024-03-15 RX ORDER — BISACODYL 5 MG/1
5 TABLET, DELAYED RELEASE ORAL DAILY PRN
Status: DISCONTINUED | OUTPATIENT
Start: 2024-03-15 | End: 2024-03-16 | Stop reason: HOSPADM

## 2024-03-15 RX ORDER — SODIUM CHLORIDE 9 MG/ML
40 INJECTION, SOLUTION INTRAVENOUS AS NEEDED
Status: DISCONTINUED | OUTPATIENT
Start: 2024-03-15 | End: 2024-03-16 | Stop reason: HOSPADM

## 2024-03-15 RX ORDER — ALUMINA, MAGNESIA, AND SIMETHICONE 2400; 2400; 240 MG/30ML; MG/30ML; MG/30ML
30 SUSPENSION ORAL EVERY 4 HOURS PRN
Status: DISCONTINUED | OUTPATIENT
Start: 2024-03-15 | End: 2024-03-16 | Stop reason: HOSPADM

## 2024-03-15 RX ORDER — NITROGLYCERIN 0.4 MG/1
0.4 TABLET SUBLINGUAL
Status: DISCONTINUED | OUTPATIENT
Start: 2024-03-15 | End: 2024-03-16 | Stop reason: HOSPADM

## 2024-03-15 RX ADMIN — ONDANSETRON 4 MG: 2 INJECTION INTRAMUSCULAR; INTRAVENOUS at 14:56

## 2024-03-15 RX ADMIN — ALUMINUM HYDROXIDE, MAGNESIUM HYDROXIDE, AND DIMETHICONE 30 ML: 400; 400; 40 SUSPENSION ORAL at 12:45

## 2024-03-15 RX ADMIN — Medication 500 MCG: at 09:45

## 2024-03-15 RX ADMIN — PANTOPRAZOLE SODIUM 40 MG: 40 TABLET, DELAYED RELEASE ORAL at 09:44

## 2024-03-15 RX ADMIN — Medication 100 MG: at 09:44

## 2024-03-15 RX ADMIN — SODIUM CHLORIDE, POTASSIUM CHLORIDE, SODIUM LACTATE AND CALCIUM CHLORIDE 500 ML: 600; 310; 30; 20 INJECTION, SOLUTION INTRAVENOUS at 04:52

## 2024-03-15 RX ADMIN — ASPIRIN 81 MG: 81 TABLET, COATED ORAL at 09:44

## 2024-03-15 RX ADMIN — METOPROLOL SUCCINATE 25 MG: 25 TABLET, EXTENDED RELEASE ORAL at 14:21

## 2024-03-15 RX ADMIN — Medication 1 TABLET: at 09:45

## 2024-03-15 RX ADMIN — Medication 10 ML: at 09:45

## 2024-03-15 RX ADMIN — FOLIC ACID 1 MG: 1 TABLET ORAL at 09:45

## 2024-03-15 RX ADMIN — Medication 5 MG: at 21:25

## 2024-03-15 RX ADMIN — ROSUVASTATIN CALCIUM 40 MG: 40 TABLET, FILM COATED ORAL at 21:25

## 2024-03-15 RX ADMIN — SODIUM CHLORIDE 100 ML/HR: 9 INJECTION, SOLUTION INTRAVENOUS at 07:07

## 2024-03-15 RX ADMIN — AMLODIPINE BESYLATE 5 MG: 5 TABLET ORAL at 10:47

## 2024-03-15 RX ADMIN — VILAZODONE HYDROCHLORIDE 40 MG: 40 TABLET, FILM COATED ORAL at 09:45

## 2024-03-15 NOTE — ED NOTES
Nursing report ED to floor  Flako Coreas  58 y.o.  male    HPI :  HPI (Adult)  Stated Reason for Visit: syncopal episode at home, 4x in the past week. hit head, +thinners. known upper and lower GI bleed  History Obtained From: EMS    Chief Complaint  Chief Complaint   Patient presents with    Syncope       Admitting doctor:   Brandon Rosado MD    Admitting diagnosis:   The primary encounter diagnosis was Syncope, unspecified syncope type. Diagnoses of Cerebellar stroke, Hypersomnolence, Abrasion of face, initial encounter, Injury of head, initial encounter, and ALLISON (acute kidney injury) were also pertinent to this visit.    Code status:   Current Code Status       Date Active Code Status Order ID Comments User Context       3/15/2024 0545 CPR (Attempt to Resuscitate) [id number]  Sravanthi Heath APRN ED        Question Answer    Code Status (Patient has no pulse and is not breathing) CPR (Attempt to Resuscitate)    Medical Interventions (Patient has pulse or is breathing) Full Support                    Allergies:   Patient has no known allergies.    Isolation:   No active isolations    Intake and Output    Intake/Output Summary (Last 24 hours) at 3/15/2024 0718  Last data filed at 3/15/2024 0522  Gross per 24 hour   Intake 500 ml   Output --   Net 500 ml       Weight:       03/15/24  0413   Weight: 107 kg (235 lb)       Most recent vitals:   Vitals:    03/15/24 0528 03/15/24 0531 03/15/24 0631 03/15/24 0701   BP:  120/84 110/65 105/61   Pulse: 51 53 (!) 46 (!) 47   Resp:       Temp:       TempSrc:       SpO2: 93% 96% 94% 94%   Weight:       Height:           Active LDAs/IV Access:   Lines, Drains & Airways       Active LDAs       Name Placement date Placement time Site Days    Peripheral IV Anterior;Distal;Left;Upper Arm --  --  Arm  --                    Labs (abnormal labs have a star):   Labs Reviewed   COMPREHENSIVE METABOLIC PANEL - Abnormal; Notable for the following components:       Result  Value    Glucose 179 (*)     Creatinine 1.31 (*)     Chloride 110 (*)     CO2 21.0 (*)     All other components within normal limits    Narrative:     GFR Normal >60  Chronic Kidney Disease <60  Kidney Failure <15     PROTIME-INR - Abnormal; Notable for the following components:    Protime 14.5 (*)     INR 1.11 (*)     All other components within normal limits   CBC WITH AUTO DIFFERENTIAL - Abnormal; Notable for the following components:    Hemoglobin 11.4 (*)     Hematocrit 36.5 (*)     MCH 25.1 (*)     MCHC 31.2 (*)     Eosinophil % 7.2 (*)     Eosinophils, Absolute 0.52 (*)     All other components within normal limits   APTT - Normal   SINGLE HSTROPONIN T - Normal    Narrative:     High Sensitive Troponin T Reference Range:  <14.0 ng/L- Negative Female for AMI  <22.0 ng/L- Negative Male for AMI  >=14 - Abnormal Female indicating possible myocardial injury.  >=22 - Abnormal Male indicating possible myocardial injury.   Clinicians would have to utilize clinical acumen, EKG, Troponin, and serial changes to determine if it is an Acute Myocardial Infarction or myocardial injury due to an underlying chronic condition.        LACTIC ACID, PLASMA - Normal   RAINBOW DRAW    Narrative:     The following orders were created for panel order [place] Draw.  Procedure                               Abnormality         Status                     ---------                               -----------         ------                     Green Top (Gel)[[id number]]                                  Final result               Lavender Top[[id number]]                                     Final result               Gold Top - SST[[id number]]                                   Final result               Light Blue Top[[id number]]                                   Final result                 Please view results for these tests on the individual orders.   BASIC METABOLIC PANEL   CBC WITH AUTO DIFFERENTIAL   TROPONIN   MAGNESIUM   GREEN TOP    LAVENDER TOP   GOLD TOP - [facility]   LIGHT BLUE TOP   CBC AND DIFFERENTIAL    Narrative:     The following orders were created for panel order CBC & Differential.  Procedure                               Abnormality         Status                     ---------                               -----------         ------                     CBC Auto Differential[[id number]]        Abnormal            Final result                 Please view results for these tests on the individual orders.       EKG:   ECG 12 Lead Syncope   Preliminary Result   HEART RATE= 53  bpm   RR Interval= 1132  ms   MD Interval= 157  ms   P Horizontal Axis= 15  deg   P Front Axis= -14  deg   QRSD Interval= 97  ms   QT Interval= 438  ms   QTcB= 412  ms   QRS Axis= 49  deg   T Wave Axis= 51  deg   - OTHERWISE NORMAL ECG -   Sinus rhythm   Borderline low voltage, extremity leads   Electronically Signed By:    Date and Time of Study: 2024-03-15 05:06:25          Meds given in ED:   Medications   sodium chloride 0.9 % flush 10 mL (has no administration in time range)   sodium chloride 0.9 % flush 10 mL (has no administration in time range)   sodium chloride 0.9 % flush 10 mL (has no administration in time range)   sodium chloride 0.9 % flush 10 mL (has no administration in time range)   sodium chloride 0.9 % infusion 40 mL (has no administration in time range)   nitroglycerin (NITROSTAT) SL tablet 0.4 mg (has no administration in time range)   sodium chloride 0.9 % infusion (100 mL/hr Intravenous New Bag 3/15/24 0707)   sennosides-docusate (PERICOLACE) 8.6-50 MG per tablet 2 tablet (has no administration in time range)     And   polyethylene glycol (MIRALAX) packet 17 g (has no administration in time range)     And   bisacodyl (DULCOLAX) EC tablet 5 mg (has no administration in time range)     And   bisacodyl (DULCOLAX) suppository 10 mg (has no administration in time range)   ondansetron (ZOFRAN) injection 4 mg (has no administration in time range)    lactated ringers bolus 500 mL (0 mL Intravenous Stopped 3/15/24 0522)       Imaging results:  XR Chest 1 View    Result Date: 3/15/2024  Electronically signed by Brandon Jefferson MD on 03-15-24 at 0645    CT Facial Bones Without Contrast    Result Date: 3/15/2024  Electronically signed by Brandon Jefferson MD on 03-15-24 at 0523    CT Head Without Contrast    Result Date: 3/15/2024  Electronically signed by Brandon Jefferson MD on 03-15-24 at 0521     Ambulatory status:   - assist    Social issues:   Social History     Socioeconomic History    Marital status: Single   Tobacco Use    Smoking status: Never    Smokeless tobacco: Never   Vaping Use    Vaping status: Never Used   Substance and Sexual Activity    Alcohol use: Not Currently     Comment: 12 beers on the weekend    Drug use: Not Currently     Frequency: 1.0 times per week     Types: Marijuana     Comment: patient states he has been smoking marijuania daily x 2 weeks    Sexual activity: Not Currently     Partners: Female     Birth control/protection: Other, Birth control pill       Peripheral Neurovascular       Neuro Cognitive  Neuro Cognitive (Adult)  Cognitive/Neuro/Behavioral WDL: .WDL except, mood/behavior, orientation  Orientation: oriented x 4  Mood/Behavior: calm, cooperative  Additional Documentation: Headache Assessment (Group)  Headache Assessment  Headache Location: occipital  Severity Rating (0-10): 4  Description/Character: dull  Associated Signs/Symptoms: dizziness, nausea    Learning  Learning Assessment (Adult)  Learning Readiness and Ability: no barriers identified  Education Provided  Person Taught: patient  Education Outcome Evaluation: verbalizes understanding    Respiratory  Respiratory (Adult)  Airway WDL: WDL  Respiratory WDL  Respiratory WDL: WDL    Abdominal Pain       Pain Assessments  Pain (Adult)  (0-10) Pain Rating: Rest: 2  (0-10) Pain Rating: Activity: 2    NIH Stroke Scale       Brian Pedersen, RN  03/15/24 07:18 EDT

## 2024-03-15 NOTE — H&P
Patient Name:  Flako Coreas  YOB: 1965  MRN:  [id number]  Admit Date:  3/15/2024  Patient Care Team:  Akash Rodriguez Jr.,  as PCP - General (Sports Medicine)      Subjective   History Present Illness     Chief Complaint   Patient presents with    Syncope       Mr. Coreas is a 58 y.o. gentleman with a history of hypertension complicated recent medical history including stroke which occurred in August.  He came back in 2 months later with recurrent left cerebellar and right thalamic infarcts and was switched from Plavix to Brilinta at that time.  He has been dealing with chronic issues with dizziness, ataxia and has had some syncopal or near syncopal episodes requiring admission several times since then.  Most recently in February was admitted for a couple of nights to the observation unit with negative neurologic workup but he had been having some rectal bleeding and underwent EGD and colonoscopy which showed Lepe's esophagus and a hiatal hernia along with some diverticulosis but nothing bleeding.  Neurology last saw him in the office about 3 weeks ago.  Patient has recently been on only aspirin 81 mg and was taken off Brilinta I believe during the observation stay.    The issue requiring visit this time is a recurrent episode of syncope.  He had gotten up to go to the bathroom in the middle the night around 3 AM and experienced what he describes as tunnel vision and feeling like he could not walk followed by a syncopal episode.  He apparently hit face first and has some abrasions on his nose but thankfully no fracture seen on imaging.  He has been slightly bradycardic on initial arrival but now is up in the 60s.  He has a loop recorder in place and cardiology has already seen him and interrogated it and finds no evidence for bradyarrhythmias.  They have been following him for some of these episodes and had thought he was perhaps orthostatic but he does have some difficult to  control blood pressures and is on several medicines including hydralazine, metoprolol XL, amlodipine and lisinopril and spironolactone.  Apparently was recently taken off HCTZ in the cardiology office per their note.  He describes these episodes as always occurring when he gets up from a seated or laying position and after he takes a few steps.  Has happened 3 times in the last week.  He reports that he has chronic diarrhea and has been told he may have IBS and this has been bothering him quite a bit recently.  Orthostatics checked upon arrival to his board x-ray showed increase in blood pressure with rising to standing position.      Review of Systems   Constitutional:  Negative for chills and fever.   HENT:  Negative for congestion and trouble swallowing.    Eyes:  Negative for visual disturbance.   Respiratory:  Negative for cough, chest tightness, shortness of breath and wheezing.    Cardiovascular:  Negative for chest pain and palpitations.   Gastrointestinal:  Positive for diarrhea and nausea. Negative for abdominal distention, abdominal pain, constipation and vomiting.   Genitourinary:  Negative for dysuria, frequency and urgency.   Musculoskeletal:  Negative for arthralgias.   Skin:  Negative for rash.   Neurological:  Positive for dizziness and syncope. Negative for weakness and headaches.   Psychiatric/Behavioral:  Negative for agitation and confusion.         Personal History     Past Medical History:   Diagnosis Date    ADHD (attention deficit hyperactivity disorder)     On going issue    Anxiety     Lepe's esophagus     Benign prostatic hyperplasia Surgery in 12/2021    Brain concussion 11/2023    Clotting disorder Intestinal    Depression     Diverticulitis     Diverticulosis     Duodenitis     Enteritis     Failure to thrive (child)     Family history of blood clots     Fracture of wrist 1985    Fracture, foot 2019    GERD (gastroesophageal reflux disease)     Hyperlipidemia     Hypertension      Hypertensive emergency     Irritable bowel syndrome 2017    Low testosterone     Microcytosis     Pancreatitis     Pneumonia     PONV (postoperative nausea and vomiting)     Seasonal affective disorder     Shoulder injury     Stroke     Unexplained weight loss     Visual impairment 8/2023     Past Surgical History:   Procedure Laterality Date    CARDIAC ELECTROPHYSIOLOGY PROCEDURE N/A 01/24/2024    Procedure: Loop insertion- [employer] LINQ;  Surgeon: Kaela Walker MD;  Location: [facility] CATH INVASIVE LOCATION;  Service: Cardiovascular;  Laterality: N/A;    COLON SURGERY      COLONOSCOPY      COLONOSCOPY N/A 12/11/2022    Procedure: COLONOSCOPY INTO CECUM WITH HOT SNARE POLYPECTOMY;  Surgeon: Albert Gallo MD;  [facility];  Service: Gastroenterology;  Laterality: N/A;  PRE- GI BLEED  POST- DIVERTICULOSIS, POLYP    COLONOSCOPY N/A 02/14/2024    Procedure: COLONOSCOPY into cecum/terminal ileum;  Surgeon: Albert Gallo MD;  Location: [facility] ENDOSCOPY;  Service: Gastroenterology;  Laterality: N/A;  diverticulosis, hemorrhoids    ENDOSCOPY N/A 12/10/2022    Procedure: ESOPHAGOGASTRODUODENOSCOPY;  Surgeon: Albert Gallo MD;  [facility];  Service: Gastroenterology;  Laterality: N/A;  PRE- DARK STOOLS  POST- BARRETTS ESOPHAGUS    ENDOSCOPY N/A 02/14/2024    Procedure: ESOPHAGOGASTRODUODENOSCOPY with biopsy;  Surgeon: Albert Gallo MD;  Location: [facility] ENDOSCOPY;  Service: Gastroenterology;  Laterality: N/A;  long segment wilson's, hiatal hernia    FRACTURE SURGERY Right 1980    for broken arm    FRACTURE SURGERY Right     for broken hand    HAND SURGERY  1990    INCISION AND DRAINAGE ABSCESS  2015    anal    PROSTATE SURGERY      SHOULDER SURGERY  2022    SMALL INTESTINE SURGERY      TONSILLECTOMY      TRIGGER POINT INJECTION  2017     Family History   Problem Relation Age of Onset    Stroke Mother     Heart disease Mother     Stroke Father     Hyperlipidemia Father      Hypertension Father      Social History     Tobacco Use    Smoking status: Never    Smokeless tobacco: Never   Vaping Use    Vaping status: Never Used   Substance Use Topics    Alcohol use: Not Currently     Comment: 12 beers on the weekend    Drug use: Not Currently     Frequency: 1.0 times per week     Types: Marijuana     Comment: patient states he has been smoking marijuania daily x 2 weeks     No current facility-administered medications on file prior to encounter.     Current Outpatient Medications on File Prior to Encounter   Medication Sig Dispense Refill    amLODIPine (NORVASC) 5 MG tablet Take 1 tablet by mouth Daily. 30 tablet 0    aspirin 81 MG EC tablet Take 1 tablet by mouth Daily. 90 tablet 1    folic acid (FOLVITE) 1 MG tablet Take 1 tablet by mouth Daily. 90 tablet 0    hydrALAZINE (APRESOLINE) 100 MG tablet Take 1 tablet by mouth Every 8 (Eight) Hours. 270 tablet 1    lisinopril (PRINIVIL,ZESTRIL) 40 MG tablet Take 1 tablet by mouth Daily. 90 tablet 1    melatonin 5 MG tablet tablet Take 1 tablet by mouth Every Night. 90 tablet 0    metoprolol succinate XL (TOPROL-XL) 50 MG 24 hr tablet Take 1 tablet by mouth Daily. 90 tablet 3    multivitamin (One-Daily Multi-Vitamin) tablet tablet Take 1 tablet by mouth Daily. 90 tablet 0    pantoprazole (PROTONIX) 40 MG EC tablet Take 1 tablet by mouth Every Morning. 30 tablet 1    rosuvastatin (Crestor) 20 MG tablet Take 2 tablets by mouth Every Night. 30 tablet 11    spironolactone (ALDACTONE) 25 MG tablet Take 1 tablet by mouth Daily. 90 tablet 1    thiamine (VITAMIN B1) 100 MG tablet Take 1 tablet by mouth Daily. 90 tablet 0    vilazodone (Viibryd) 40 MG tablet tablet Take 1 tablet by mouth Daily. 90 tablet 1    vitamin B-12 (CYANOCOBALAMIN) 500 MCG tablet Take 1 tablet by mouth Daily. 90 tablet 0     No Known Allergies    Objective    Objective     Vital Signs  Temp:  [97.9 °F (36.6 °C)-98 °F (36.7 °C)] 97.9 °F (36.6 °C)  Heart Rate:  [46-79] 65  Resp:   [18-22] 18  BP: (105-180)/() 180/117  SpO2:  [92 %-98 %] 96 %  on   ;   Device (Oxygen Therapy): room air  Body mass index is 30.17 kg/m².    Physical Exam  Vitals reviewed.   Constitutional:       General: He is not in acute distress.     Appearance: He is well-developed. He is obese. He is not ill-appearing.   HENT:      Head:      Comments: Abrasions on the bridge of his nose and some swelling     Mouth/Throat:      Pharynx: No oropharyngeal exudate.   Eyes:      General: No scleral icterus.     Pupils: Pupils are equal, round, and reactive to light.   Neck:      Vascular: No JVD.   Cardiovascular:      Rate and Rhythm: Normal rate and regular rhythm.      Heart sounds: No murmur heard.  Pulmonary:      Effort: Pulmonary effort is normal. No respiratory distress.      Breath sounds: Normal breath sounds. No wheezing.   Abdominal:      General: Bowel sounds are normal. There is no distension.      Palpations: Abdomen is soft.      Tenderness: There is no abdominal tenderness.   Musculoskeletal:      Right lower leg: No edema.      Left lower leg: No edema.   Skin:     General: Skin is warm and dry.      Findings: No rash.   Neurological:      General: No focal deficit present.      Mental Status: He is alert and oriented to person, place, and time.   Psychiatric:      Comments: Anxious         Results Review:  I reviewed the patient's new clinical results.  I reviewed the patient's new imaging results and agree with the interpretation.  I reviewed the patient's other test results and agree with the interpretation  I personally viewed and interpreted the patient's EKG/Telemetry data  Discussed with ED provider.    Lab Results (last 24 hours)       Procedure Component Value Units Date/Time    CBC & Differential [[id number]]  (Abnormal) Collected: 03/15/24 0423    Specimen: Blood from Arm, Left Updated: 03/15/24 9622    Narrative:      The following orders were created for panel order CBC &  Differential.  Procedure                               Abnormality         Status                     ---------                               -----------         ------                     CBC Auto Differential[[id number]]        Abnormal            Final result                 Please view results for these tests on the individual orders.    Comprehensive Metabolic Panel [[id number]]  (Abnormal) Collected: 03/15/24 0423    Specimen: Blood from Arm, Left Updated: 03/15/24 0459     Glucose 179 mg/dL      BUN 18 mg/dL      Creatinine 1.31 mg/dL      Sodium 143 mmol/L      Potassium 3.6 mmol/L      Chloride 110 mmol/L      CO2 21.0 mmol/L      Calcium 9.1 mg/dL      Total Protein 6.9 g/dL      Albumin 4.4 g/dL      ALT (SGPT) 22 U/L      AST (SGOT) 14 U/L      Alkaline Phosphatase 79 U/L      Total Bilirubin 0.2 mg/dL      Globulin 2.5 gm/dL      A/G Ratio 1.8 g/dL      BUN/Creatinine Ratio 13.7     Anion Gap 12.0 mmol/L      eGFR 63.9 mL/min/1.73     Narrative:      GFR Normal >60  Chronic Kidney Disease <60  Kidney Failure <15      Protime-INR [[id number]]  (Abnormal) Collected: 03/15/24 0423    Specimen: Blood from Arm, Left Updated: 03/15/24 0449     Protime 14.5 Seconds      INR 1.11    aPTT [[id number]]  (Normal) Collected: 03/15/24 0423    Specimen: Blood from Arm, Left Updated: 03/15/24 0449     PTT 27.8 seconds     Single High Sensitivity Troponin T [[id number]]  (Normal) Collected: 03/15/24 0423    Specimen: Blood from Arm, Left Updated: 03/15/24 0459     HS Troponin T 9 ng/L     Narrative:      High Sensitive Troponin T Reference Range:  <14.0 ng/L- Negative Female for AMI  <22.0 ng/L- Negative Male for AMI  >=14 - Abnormal Female indicating possible myocardial injury.  >=22 - Abnormal Male indicating possible myocardial injury.   Clinicians would have to utilize clinical acumen, EKG, Troponin, and serial changes to determine if it is an Acute Myocardial Infarction or myocardial injury due to an underlying chronic  condition.         CBC Auto Differential [[id number]]  (Abnormal) Collected: 03/15/24 0423    Specimen: Blood from Arm, Left Updated: 03/15/24 0455     WBC 7.22 10*3/mm3      RBC 4.54 10*6/mm3      Hemoglobin 11.4 g/dL      Hematocrit 36.5 %      MCV 80.4 fL      MCH 25.1 pg      MCHC 31.2 g/dL      RDW 13.6 %      RDW-SD 39.3 fl      MPV 10.1 fL      Platelets 295 10*3/mm3      Neutrophil % 58.3 %      Lymphocyte % 25.9 %      Monocyte % 7.6 %      Eosinophil % 7.2 %      Basophil % 0.7 %      Immature Grans % 0.3 %      Neutrophils, Absolute 4.21 10*3/mm3      Lymphocytes, Absolute 1.87 10*3/mm3      Monocytes, Absolute 0.55 10*3/mm3      Eosinophils, Absolute 0.52 10*3/mm3      Basophils, Absolute 0.05 10*3/mm3      Immature Grans, Absolute 0.02 10*3/mm3      nRBC 0.0 /100 WBC     Lactic Acid, Plasma [[id number]]  (Normal) Collected: 03/15/24 0452    Specimen: Blood Updated: 03/15/24 0517     Lactate 1.5 mmol/L             Imaging Results (Last 24 Hours)       Procedure Component Value Units Date/Time    XR Chest 1 View [[id number]] Collected: 03/15/24 0645     Updated: 03/15/24 0645    Narrative:        Patient: NIMESH ROCHA  Time Out: 06:45  Exam(s): XR CXR 1 VIEW     EXAM:    XR Chest, 1 View    CLINICAL HISTORY:     Reason for exam: Syncope.    TECHNIQUE:    Frontal view of the chest.    COMPARISON:  Single view of the chest 12 10 2023    IMPRESSION:     1.  Interval placement of loop recorder.  2.  No acute cardiopulmonary abnormality.    Impression:          Electronically signed by Brandon Jefferson MD on 03-15-24 at 0645    CT Facial Bones Without Contrast [[id number]] Collected: 03/15/24 0524     Updated: 03/15/24 0524    Narrative:        Patient: NIMESH ROCHA  Time Out: 05:23  Exam(s): CT FACIAL Without Contrast     EXAM:    CT Maxillofacial Without Intravenous Contrast    CLINICAL HISTORY:     Reason for exam: Syncope, head injury.    TECHNIQUE:    Axial computed tomography images of the face without  intravenous   contrast.  CTDI is 26.15 mGy and DLP is 547.5 mGy-cm.  This CT exam was   performed according to the principle of ALARA (As Low As Reasonably   Achievable) by using one or more of the following dose reduction   techniques: automated exposure control, adjustment of the mA and or kV   according to patient size, and or use of iterative reconstruction   technique.    COMPARISON:    No relevant prior studies available.    FINDINGS:    Bones joints:  No facial fracture detected.    Soft tissues:  Unremarkable.    Orbits:  No evidence of globe rupture.  Consider direct inspection if   there is further concern.  No evidence of retrobulbar hematoma.    Sinuses:  Unremarkable.    Dental:  No evidence of traumatic dental avulsion or luxation.    IMPRESSION:       1.  No facial fracture detected.  2.  No evidence of globe rupture.  Consider direct inspection if there is   further concern.  3.  No evidence of traumatic dental avulsion or luxation.      Impression:          Electronically signed by Brandon Jefferson MD on 03-15-24 at 0523    CT Head Without Contrast [[id number]] Collected: 03/15/24 0522     Updated: 03/15/24 0522    Narrative:        Patient: NIMESH ROCHA  Time Out: 05:21  Exam(s): CT HEAD Without Contrast     EXAM:    CT Head Without Intravenous Contrast    CLINICAL HISTORY:     Reason for exam: Syncope, head injury.    TECHNIQUE:    Axial computed tomography images of the head brain without intravenous   contrast.  CTDI is 55.44 mGy and DLP is 1025.6 mGy-cm.  This CT exam was   performed according to the principle of ALARA (As Low As Reasonably   Achievable) by using one or more of the following dose reduction   techniques: automated exposure control, adjustment of the mA and or kV   according to patient size, and or use of iterative reconstruction   technique.    COMPARISON:    No relevant prior studies available.    FINDINGS:    Brain:  No acute intracranial abnormality.  Consider MRI if there  is   further concern.  There are a few areas of decreased attenuation in the   deep cerebral white matter consistent with mild small vessel   ischemic degenerative changes.  The cerebral and cerebellar sulci are   mildly prominent consistent with mild brain atrophy.  No hemorrhage.    Ventricles:  Unremarkable.  No ventriculomegaly.    Bones joints:  Unremarkable.  No acute fracture.    Soft tissues:  Unremarkable.    Vasculature:  Atherosclerotic disease.    Sinuses:  Unremarkable as visualized.    Mastoid air cells:  Unremarkable as visualized.  No mastoid effusion.    IMPRESSION:         No acute intracranial abnormality.  Consider MRI if there is further   concern.      Impression:          Electronically signed by Brandon Jefferson MD on 03-15-24 at 0521                ECG 12 Lead Syncope   Preliminary Result   HEART RATE= 53  bpm   RR Interval= 1132  ms   AL Interval= 157  ms   P Horizontal Axis= 15  deg   P Front Axis= -14  deg   QRSD Interval= 97  ms   QT Interval= 438  ms   QTcB= 412  ms   QRS Axis= 49  deg   T Wave Axis= 51  deg   - OTHERWISE NORMAL ECG -   Sinus rhythm   Borderline low voltage, extremity leads   Electronically Signed By:    Date and Time of Study: 2024-03-15 05:06:25           Assessment/Plan     Active Hospital Problems    Diagnosis  POA    **Syncope [R55]  Yes    ALLISON (acute kidney injury) [N17.9]  Unknown    History of CVA with residual deficit [I69.30]  Not Applicable    HTN (hypertension) [I10]  Yes    Lepe's esophagus without dysplasia [K22.70]  Yes    GERD (gastroesophageal reflux disease) [K21.9]  Yes    Mixed anxiety depressive disorder [F41.8]  Yes      Resolved Hospital Problems   No resolved problems to display.       Mr. Coreas is a 58 y.o. male with history of hypertension, GERD/Lepe's and stroke along with recurrent episodes of syncope admitted with another episode as described    Etiology of the syncope is uncertain.  Apparently not related to any bradycardia per loop  recorder.  Description sounds orthostatic but he does not appear to be orthostatic as of this morning.  Vasodilators like CCB's and hydralazine along with diuretic certainly could put him at risk for orthostasis but BP also gets fairly high at times.  Mild ALLISON which certainly would argue that he is perhaps a bit dehydrated and would explain some degree of orthostasis.  Might relate to his chronic nausea and diarrhea from IBS.  I would avoid diuretics in this gentleman for this reason  Held lisinopril this AM due mild ALLISON but prob would be a great choice for him to stay on long term  Continue ASA/rosuvastatin for stroke history.  Neurology to see in consultation  Continue hydration for now.  Continue Protonix      VTE Prophylaxis - SCDs.  Code Status - Full code.       Brandon Rosado MD  [facility]  03/15/24  12:39 EDT

## 2024-03-15 NOTE — CONSULTS
NEUROLOGY CONSULT - STROKE TEAM    DOS: 3/15/2024  NAME: Flako Coreas   : 1965  PCP: Akash Rodriguez Jr., DO  CC: Stroke  Referring MD: Juan Ramon Flowers MD  Patient being seen at request of Dr. Rosado for advice and opinion on syncope.    Neurological Problem and Interval History:  58 y.o. male presents with chief complaint of: passing out.    58m with two small strokes in the left cerebellum up to 7mm in size in 2023 but has had a number of difficulties since then. Patient has had recurrent episodes of falls as well as dizziness and eventually passing out. Numerous MRI, CT and CTP have been negative. When he passes out or has presyncope he becomes diaphoretic and at one point he took his blood pressure and it was low. Patient has recently been diagnosed with mild sleep apnea and will be starting on the machine next week. Patient has a prior history of concussion, ADHD and anxiety disorder. In February, he was found to have a B12 of 370 and was started on replacement.      Past Medical/Surgical Hx:  Past Medical History:   Diagnosis Date    ADHD (attention deficit hyperactivity disorder)     On going issue    Anxiety     Lepe's esophagus     Benign prostatic hyperplasia Surgery in 2021    Brain concussion 2023    Clotting disorder Intestinal    Depression     Diverticulitis     Diverticulosis     Duodenitis     Enteritis     Failure to thrive (child)     Family history of blood clots     Fracture of wrist     Fracture, foot 2019    GERD (gastroesophageal reflux disease)     Hyperlipidemia     Hypertension     Hypertensive emergency     Irritable bowel syndrome 2017    Low testosterone     Microcytosis     Pancreatitis     Pneumonia     PONV (postoperative nausea and vomiting)     Seasonal affective disorder     Shoulder injury     Stroke     Unexplained weight loss     Visual impairment 2023     Past Surgical History:   Procedure Laterality Date    CARDIAC  ELECTROPHYSIOLOGY PROCEDURE N/A 01/24/2024    Procedure: Loop insertion- [employer] LINQ;  Surgeon: Kaela Walker MD;  Location: [facility] CATH INVASIVE LOCATION;  Service: Cardiovascular;  Laterality: N/A;    COLON SURGERY      COLONOSCOPY      COLONOSCOPY N/A 12/11/2022    Procedure: COLONOSCOPY INTO CECUM WITH HOT SNARE POLYPECTOMY;  Surgeon: Albert Gallo MD;  Location:  [facility];  Service: Gastroenterology;  Laterality: N/A;  PRE- GI BLEED  POST- DIVERTICULOSIS, POLYP    COLONOSCOPY N/A 02/14/2024    Procedure: COLONOSCOPY into cecum/terminal ileum;  Surgeon: Albert Gallo MD;  Location:  [facility];  Service: Gastroenterology;  Laterality: N/A;  diverticulosis, hemorrhoids    ENDOSCOPY N/A 12/10/2022    Procedure: ESOPHAGOGASTRODUODENOSCOPY;  Surgeon: Albert Gallo MD;  Location:  [facility];  Service: Gastroenterology;  Laterality: N/A;  PRE- DARK STOOLS  POST- BARRETTS ESOPHAGUS    ENDOSCOPY N/A 02/14/2024    Procedure: ESOPHAGOGASTRODUODENOSCOPY with biopsy;  Surgeon: Albert Gallo MD;  [facility];  Service: Gastroenterology;  Laterality: N/A;  long segment wilson's, hiatal hernia    FRACTURE SURGERY Right 1980    for broken arm    FRACTURE SURGERY Right     for broken hand    HAND SURGERY  1990    INCISION AND DRAINAGE ABSCESS  2015    anal    PROSTATE SURGERY      SHOULDER SURGERY  2022    SMALL INTESTINE SURGERY      TONSILLECTOMY      TRIGGER POINT INJECTION  2017       Review of Systems:      Medications On Admission  Medications Prior to Admission   Medication Sig Dispense Refill Last Dose    amLODIPine (NORVASC) 5 MG tablet Take 1 tablet by mouth Daily. 30 tablet 0 3/14/2024    aspirin 81 MG EC tablet Take 1 tablet by mouth Daily. 90 tablet 1 3/14/2024    folic acid (FOLVITE) 1 MG tablet Take 1 tablet by mouth Daily. 90 tablet 0 3/14/2024    hydrALAZINE (APRESOLINE) 100 MG tablet Take 1 tablet by mouth Every 8 (Eight) Hours. 270 tablet 1 3/14/2024     lisinopril (PRINIVIL,ZESTRIL) 40 MG tablet Take 1 tablet by mouth Daily. 90 tablet 1 3/14/2024    melatonin 5 MG tablet tablet Take 1 tablet by mouth Every Night. 90 tablet 0 Past Week    metoprolol succinate XL (TOPROL-XL) 50 MG 24 hr tablet Take 1 tablet by mouth Daily. 90 tablet 3 3/14/2024    multivitamin (One-Daily Multi-Vitamin) tablet tablet Take 1 tablet by mouth Daily. 90 tablet 0 3/14/2024    pantoprazole (PROTONIX) 40 MG EC tablet Take 1 tablet by mouth Every Morning. 30 tablet 1 3/14/2024    rosuvastatin (Crestor) 20 MG tablet Take 2 tablets by mouth Every Night. 30 tablet 11 3/14/2024    spironolactone (ALDACTONE) 25 MG tablet Take 1 tablet by mouth Daily. 90 tablet 1 3/14/2024    thiamine (VITAMIN B1) 100 MG tablet Take 1 tablet by mouth Daily. 90 tablet 0 Past Week    vilazodone (Viibryd) 40 MG tablet tablet Take 1 tablet by mouth Daily. 90 tablet 1 3/14/2024    vitamin B-12 (CYANOCOBALAMIN) 500 MCG tablet Take 1 tablet by mouth Daily. 90 tablet 0 Past Week       Allergies:  No Known Allergies    Social Hx:  Social History     Socioeconomic History    Marital status: Single   Tobacco Use    Smoking status: Never    Smokeless tobacco: Never   Vaping Use    Vaping status: Never Used   Substance and Sexual Activity    Alcohol use: Not Currently     Comment: 12 beers on the weekend    Drug use: Not Currently     Frequency: 1.0 times per week     Types: Marijuana     Comment: patient states he has been smoking marijuania daily x 2 weeks    Sexual activity: Not Currently     Partners: Female     Birth control/protection: Other, Birth control pill       Family Hx:  Family History   Problem Relation Age of Onset    Stroke Mother     Heart disease Mother     Stroke Father     Hyperlipidemia Father     Hypertension Father        Review of Imaging (Interpretation of images not reports):      Laboratory Results:   Lab Results   Component Value Date    GLUCOSE 179 (H) 03/15/2024    CALCIUM 9.1 03/15/2024      "03/15/2024    K 3.6 03/15/2024    CO2 21.0 (L) 03/15/2024     (H) 03/15/2024    BUN 18 03/15/2024    CREATININE 1.31 (H) 03/15/2024    EGFRIFAFRI >60 08/27/2022    EGFRIFNONA 98 11/05/2018    BCR 13.7 03/15/2024    ANIONGAP 12.0 03/15/2024     Lab Results   Component Value Date    WBC 7.22 03/15/2024    HGB 11.4 (L) 03/15/2024    HCT 36.5 (L) 03/15/2024    MCV 80.4 03/15/2024     03/15/2024     Lab Results   Component Value Date    CHOL 167 02/12/2024    CHOL 127 10/24/2023    CHOL 117 08/20/2023     Lab Results   Component Value Date    HDL 34 (L) 02/12/2024    HDL 39 (L) 10/24/2023    HDL 34 (L) 08/20/2023     Lab Results   Component Value Date    LDL 96 02/12/2024    LDL 71 10/24/2023    LDL 62 08/20/2023     Lab Results   Component Value Date    TRIG 217 (H) 02/12/2024    TRIG 88 10/24/2023    TRIG 115 08/20/2023     Lab Results   Component Value Date    HGBA1C 5.20 02/12/2024     Lab Results   Component Value Date    INR 1.11 (H) 03/15/2024    INR 0.99 02/14/2024    INR 1.00 02/12/2024    PROTIME 14.5 (H) 03/15/2024    PROTIME 13.2 02/14/2024    PROTIME 13.3 02/12/2024         Physical Examination:   BP (!) 180/117 (BP Location: Right arm, Patient Position: Standing)   Pulse 65   Temp 97.9 °F (36.6 °C) (Oral)   Resp 18   Ht 188 cm (74\")   Wt 107 kg (235 lb)   SpO2 96%   BMI 30.17 kg/m²   General Appearance:   Well developed, well nourished, well groomed, alert, and cooperative.  HEENT: Normocephalic. Atraumatic. No JVD, no tracheal deviation.  Neck and Spine: Normal range of motion.  Normal alignment. No mass or tenderness. Not orthostatic on blood pressures taken today.    Neurological examination:  Higher Integrative  Function: Oriented to time, place and person. Normal registration, recall, attention span and concentration. Normal language including comprehension, spontaneous speech, repetition, naming and vocabulary. No neglect.   CN II: Pupils are equal, round, and reactive to light. " Blinks to visual threat and counts fingers in all fields  CN III IV VI: Extraocular movements are full without nystagmus.   CN V: Normal facial sensation   CN VII: Facial movements are symmetric. No labial dysarthria  CN VIII:   Auditory acuity is normal.  CN IX & X:   No palatal or pharnygeal dysarthria.  CN XI: Turns head without abnormality.   CN XII:   The tongue is midline.  No lingual dysarthria.   Motor: Normal muscle strength, bulk and tone in upper and lower extremities.  No fasciculations, rigidity, spasticity, or abnormal movements.  Reflexes: 2+ in the upper and lower extremities. Plantar responses are flexor.  Sensation: Normal to light touch, temperature, and proprioception in arms and legs.   Station and Gait: Deferred for bed rest    Coordination: Tremulous exam but able to give accurate finger to nose and heel to shin without ataxic movement.    NIHSS:     Diagnoses / Discussion:  58 y.o. who presents with Sx of intermittent syncope, presyncope and passing out.   1) Syncope - Patient has marked vasovagal response with diaphoresis. Multiple MRI and CTA have been negative and extensive cardiac evaluation has been unremarkable and patient is not orthostatic with events. Theoretically untreated sleep apnea with CO2 narcosis could lead to 'syncopal' and 'presyncopal' events that can occur while laying down but worse with standing. If so, CPAP (which he is scheduled to start next week) may have an impact on at least some of his symptoms.     Of interest, his vilazodone has been associated with sleep disorders but he's been on it since 2018    Plan:  1) Okay for discharge from neuro standpoint. No further testing required. Could consider outpatient tilt table test for other conditions such as POTS and autonomic dysfunction but would like to see impact of treating his sleep apnea first.     Although a difficult topic to consider, it's theoretically possible that patient is having panic/anxiety attacks which  are triggering. Patient reports he can be standing and then feel 'paralyzed' like he can't move (panic?) and he fears falling and hurting himself. Consider psych consult for whether he would benefit from adjustment of anxiolytic.     I have discussed the above with the patient.  Time spent with patient: 60min    MDM  Reviewed: previous chart, nursing note and vitals  Reviewed previous: labs, MRI and CT scan  Interpretation: CT scan, MRI and labs  Total time providing critical care: 30-74 minutes. This excludes time spent performing separately reportable procedures and services.         Bladimir Bledsoe MD  Neurology

## 2024-03-15 NOTE — CASE MANAGEMENT/SOCIAL WORK
Discharge Planning Assessment  [facility]     Patient Name: Flako Coreas  MRN: [id number]  Today's Date: 3/15/2024    Admit Date: 3/15/2024    Plan: Home with family   Discharge Needs Assessment       Row Name 03/15/24 1547       Living Environment    People in Home alone    Current Living Arrangements home    Potentially Unsafe Housing Conditions none    Primary Care Provided by self    Provides Primary Care For no one    Family Caregiver if Needed child(temo), adult    Quality of Family Relationships helpful;involved;supportive    Able to Return to Prior Arrangements yes       Resource/Environmental Concerns    Resource/Environmental Concerns none    Transportation Concerns none       Transition Planning    Patient/Family Anticipates Transition to home with family    Transportation Anticipated family or friend will provide       Discharge Needs Assessment    Readmission Within the Last 30 Days no previous admission in last 30 days    Equipment Currently Used at Home other (see comments);shower chair  Has a cane and walker he does not always use    Concerns to be Addressed denies needs/concerns at this time    Anticipated Changes Related to Illness none    Equipment Needed After Discharge none    Provided Post Acute Provider List? N/A    Provided Post Acute Provider Quality & Resource List? N/A                   Discharge Plan       Row Name 03/15/24 1547       Plan    Plan Home with family    Patient/Family in Agreement with Plan yes    Plan Comments CCP met with patient at bedside. Introduced self. Patient confirmed none of his information has changed since his last visit to [facility]. Patient is enrolled into [facility]. Patients PCP is Akash Rodriguez. Patient lives at home alone. States he is independent at home. Patient has no history of HH or SNF. Patient has a walker, cane, and shower chair at home. Alphonse plans home at discharge. Family can transport.                  Continued Care and Services -  Admitted Since 3/15/2024    No active coordination exists for this encounter.          Demographic Summary       Row Name 03/15/24 1547       General Information    Admission Type observation    Arrived From emergency department    Referral Source admission list    Reason for Consult discharge planning    Preferred Language English                   Functional Status       Row Name 03/15/24 1547       Functional Status    Usual Activity Tolerance moderate    Current Activity Tolerance moderate       Functional Status, IADL    Medications independent    Meal Preparation independent    Housekeeping independent    Laundry independent    Shopping independent       Employment/    Employment Status unemployed                   Psychosocial    No documentation.                  Abuse/Neglect    No documentation.                  Legal    No documentation.                  Substance Abuse    No documentation.                  Patient Forms    No documentation.

## 2024-03-15 NOTE — TELEPHONE ENCOUNTER
These reports are waiting for Dr Langford to sign off so they have not crossed into [facility] yet. Report from 3/7 AF events as reported 3/7 EPIC msg. Also report from 3/9 with no new events. Loop recorder was scanned 3/15 0148. No events. Let me know if you need anything else. NS    3/15 scan from [facility]      3/9-3/11 data                  Report from 3/7 transmission

## 2024-03-15 NOTE — PLAN OF CARE
Problem: Adult Inpatient Plan of Care  Goal: Plan of Care Review  Outcome: Ongoing, Progressing     Problem: Adult Inpatient Plan of Care  Goal: Absence of Hospital-Acquired Illness or Injury  Intervention: Identify and Manage Fall Risk  Recent Flowsheet Documentation  Taken 3/15/2024 0900 by Analy Hahn RN  Safety Promotion/Fall Prevention: safety round/check completed  Intervention: Prevent Skin Injury  Recent Flowsheet Documentation  Taken 3/15/2024 0900 by Analy Hahn RN  Body Position: position changed independently     Problem: Fall Injury Risk  Goal: Absence of Fall and Fall-Related Injury  Intervention: Promote Injury-Free Environment  Recent Flowsheet Documentation  Taken 3/15/2024 0900 by Analy Hahn RN  Safety Promotion/Fall Prevention: safety round/check completed   Goal Outcome Evaluation:
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
## 2024-03-15 NOTE — ED PROVIDER NOTES
" EMERGENCY DEPARTMENT ENCOUNTER  Room Number:  14/14  PCP: Akash Rodriguez Jr., DO  Independent Historians: Patient and EMS      HPI:  Chief Complaint: had concerns including Syncope.  With facial injury and dizziness    A complete HPI/ROS/PMH/PSH/SH/FH are unobtainable due to: None    Chronic or social conditions impacting patient care (Social Determinants of Health): None      Context: Flako Coreas is a 58 y.o. male with a medical history of hypertension, hyperlipidemia, stroke and IBS who presents to the ED c/o acute syncope with a facial and head injury.  Patient has had recurrent episodes of dizziness and fainting ever since his previous stroke last summer.  He lives at home alone.  This evening, he was asleep but woke up to go to the bathroom.  When he woke up he had \"tunnel vision and then felt very confused.\"  Subsequently, he fell to the ground and fainted, striking his face and nose against the ground.  He says he broke out into a diffuse sweat and was very nauseous but he did not vomit.  He denies having had any chest pain or difficulties breathing.  When he awoke, he called 911 for assistance.  Paramedics brought him here for further evaluation.  No medications given in route.      Review of prior external notes (non-ED) -and- Review of prior external test results outside of this encounter: I independently reviewed the cardiology progress note from yesterday when records indicate patient was complaining of dizziness and lightheadedness symptoms.  He was advised to stop HCTZ therapy and increase fluids while also wearing compression stockings.  I also reviewed the gastroenterology progress note from February 14, 2024 and records indicate Lepe's esophagus without dysplasia.  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 10/23/2023    HTN (hypertension) 10/24/2023    History of CVA with residual deficit 10/25/2023    Alcohol abuse 10/28/2023    Hospital discharge follow-up 02/26/2024    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    Anxiety about health 02/26/2024    Vertebral artery stenosis, left 02/26/2024    Dizziness 02/26/2024    Memory loss 02/26/2024    History of alcohol dependence 02/26/2024     Resolved Ambulatory Problems     Diagnosis Date Noted    Essential hypertension 06/26/2017    Abdominal pain 06/20/2018    Generalized abdominal pain 12/08/2022    Gastrointestinal hemorrhage 12/08/2022    Acute CVA (cerebrovascular accident) 08/17/2023    Hypertensive emergency 08/19/2023    Ataxia 08/19/2023    CVA (cerebral vascular accident) 08/22/2023    Acute CVA (cerebrovascular accident) 10/23/2023    TIA (transient ischemic attack) 10/26/2023    Concussion 10/27/2023    Fall 10/27/2023    Head injury, closed, with concussion 10/27/2023    CHI (closed head injury), initial encounter 12/10/2023    Recurrent syncope 01/19/2024    Closed head injury 02/12/2024     Past Medical History:   Diagnosis Date    ADHD (attention deficit hyperactivity disorder)     Anxiety     Lepe's esophagus     Benign prostatic hyperplasia Surgery in 12/2021    Brain concussion 11/2023    Clotting disorder Intestinal    Depression     Diverticulitis     Duodenitis     Enteritis     Failure to thrive (child)     Family history of blood clots     Fracture of wrist 1985    Fracture, foot 2019    Hyperlipidemia     Hypertension     Irritable bowel syndrome 2017    Low testosterone     Microcytosis     Pancreatitis      Pneumonia     PONV (postoperative nausea and vomiting)     Seasonal affective disorder     Shoulder injury     Stroke     Unexplained weight loss     Visual impairment 8/2023         PAST SURGICAL HISTORY  Past Surgical History:   Procedure Laterality Date    CARDIAC ELECTROPHYSIOLOGY PROCEDURE N/A 01/24/2024    Procedure: Loop insertion- [employer] LINQ;  Surgeon: Kaela Walker MD;  Location: [facility] CATH INVASIVE LOCATION;  Service: Cardiovascular;  Laterality: N/A;    COLON SURGERY      COLONOSCOPY      COLONOSCOPY N/A 12/11/2022    Procedure: COLONOSCOPY INTO CECUM WITH HOT SNARE POLYPECTOMY;  Surgeon: Albert Gallo MD;  Location: [facility] ENDOSCOPY;  Service: Gastroenterology;  Laterality: N/A;  PRE- GI BLEED  POST- DIVERTICULOSIS, POLYP    COLONOSCOPY N/A 02/14/2024    Procedure: COLONOSCOPY into cecum/terminal ileum;  Surgeon: Albert Gallo MD;  Location: [facility] ENDOSCOPY;  Service: Gastroenterology;  Laterality: N/A;  diverticulosis, hemorrhoids    ENDOSCOPY N/A 12/10/2022    Procedure: ESOPHAGOGASTRODUODENOSCOPY;  Surgeon: Albert Gallo MD;  Location: [facility] ENDOSCOPY;  Service: Gastroenterology;  Laterality: N/A;  PRE- DARK STOOLS  POST- BARRETTS ESOPHAGUS    ENDOSCOPY N/A 02/14/2024    Procedure: ESOPHAGOGASTRODUODENOSCOPY with biopsy;  Surgeon: Albert Gallo MD;  Location: [facility] ENDOSCOPY;  Service: Gastroenterology;  Laterality: N/A;  long segment wilson's, hiatal hernia    FRACTURE SURGERY Right 1980    for broken arm    FRACTURE SURGERY Right     for broken hand    HAND SURGERY  1990    INCISION AND DRAINAGE ABSCESS  2015    anal    PROSTATE SURGERY      SHOULDER SURGERY  2022    SMALL INTESTINE SURGERY      TONSILLECTOMY      TRIGGER POINT INJECTION  2017         FAMILY HISTORY  Family History   Problem Relation Age of Onset    Stroke Mother     Heart disease Mother     Stroke Father     Hyperlipidemia Father     Hypertension Father          SOCIAL HISTORY  Social History      Socioeconomic History    Marital status: Single   Tobacco Use    Smoking status: Never    Smokeless tobacco: Never   Vaping Use    Vaping status: Never Used   Substance and Sexual Activity    Alcohol use: Not Currently     Comment: 12 beers on the weekend    Drug use: Not Currently     Frequency: 1.0 times per week     Types: Marijuana     Comment: patient states he has been smoking marijuania daily x 2 weeks    Sexual activity: Not Currently     Partners: Female     Birth control/protection: Other, Birth control pill         ALLERGIES  Patient has no known allergies.      REVIEW OF SYSTEMS  Review of Systems  Included in HPI  All systems reviewed and negative except for those discussed in HPI.      PHYSICAL EXAM    I have reviewed the triage vital signs and nursing notes.    ED Triage Vitals [03/15/24 0413]   Temp Heart Rate Resp BP SpO2   98 °F (36.7 °C) 79 22 152/85 98 %      Temp src Heart Rate Source Patient Position BP Location FiO2 (%)   Oral -- -- -- --       Physical Exam  GENERAL: alert, no acute distress, appears fatigued  SKIN: Warm, dry no diaphoresis  HENT: Normocephalic, there is an obliquely oriented nasal abrasion notable.  There are some dried blood but no active bleeding evident.  Mild soft tissue tenderness around the nose and midface.  EYES: no scleral icterus, EOMI, normal conjunctiva  CV: regular rhythm, regular rate no murmurs  RESPIRATORY: normal effort, lungs clear bilaterally  ABDOMEN: soft, nontender, nondistended  MUSCULOSKELETAL: no deformity, no edema to the extremities  NEURO: alert, moves all extremities, follows commands            LAB RESULTS  Recent Results (from the past 24 hour(s))   Green Top (Gel)    Collection Time: 03/15/24  4:23 AM   Result Value Ref Range    Extra Tube Hold for add-ons.    Lavender Top    Collection Time: 03/15/24  4:23 AM   Result Value Ref Range    Extra Tube hold for add-on    Gold Top - SST    Collection Time: 03/15/24  4:23 AM   Result Value Ref  Range    Extra Tube Hold for add-ons.    Light Blue Top    Collection Time: 03/15/24  4:23 AM   Result Value Ref Range    Extra Tube Hold for add-ons.    Comprehensive Metabolic Panel    Collection Time: 03/15/24  4:23 AM    Specimen: Arm, Left; Blood   Result Value Ref Range    Glucose 179 (H) 65 - 99 mg/dL    BUN 18 6 - 20 mg/dL    Creatinine 1.31 (H) 0.76 - 1.27 mg/dL    Sodium 143 136 - 145 mmol/L    Potassium 3.6 3.5 - 5.2 mmol/L    Chloride 110 (H) 98 - 107 mmol/L    CO2 21.0 (L) 22.0 - 29.0 mmol/L    Calcium 9.1 8.6 - 10.5 mg/dL    Total Protein 6.9 6.0 - 8.5 g/dL    Albumin 4.4 3.5 - 5.2 g/dL    ALT (SGPT) 22 1 - 41 U/L    AST (SGOT) 14 1 - 40 U/L    Alkaline Phosphatase 79 39 - 117 U/L    Total Bilirubin 0.2 0.0 - 1.2 mg/dL    Globulin 2.5 gm/dL    A/G Ratio 1.8 g/dL    BUN/Creatinine Ratio 13.7 7.0 - 25.0    Anion Gap 12.0 5.0 - 15.0 mmol/L    eGFR 63.9 >60.0 mL/min/1.73   Protime-INR    Collection Time: 03/15/24  4:23 AM    Specimen: Arm, Left; Blood   Result Value Ref Range    Protime 14.5 (H) 11.7 - 14.2 Seconds    INR 1.11 (H) 0.90 - 1.10   aPTT    Collection Time: 03/15/24  4:23 AM    Specimen: Arm, Left; Blood   Result Value Ref Range    PTT 27.8 22.7 - 35.4 seconds   Single High Sensitivity Troponin T    Collection Time: 03/15/24  4:23 AM    Specimen: Arm, Left; Blood   Result Value Ref Range    HS Troponin T 9 <22 ng/L   CBC Auto Differential    Collection Time: 03/15/24  4:23 AM    Specimen: Arm, Left; Blood   Result Value Ref Range    WBC 7.22 3.40 - 10.80 10*3/mm3    RBC 4.54 4.14 - 5.80 10*6/mm3    Hemoglobin 11.4 (L) 13.0 - 17.7 g/dL    Hematocrit 36.5 (L) 37.5 - 51.0 %    MCV 80.4 79.0 - 97.0 fL    MCH 25.1 (L) 26.6 - 33.0 pg    MCHC 31.2 (L) 31.5 - 35.7 g/dL    RDW 13.6 12.3 - 15.4 %    RDW-SD 39.3 37.0 - 54.0 fl    MPV 10.1 6.0 - 12.0 fL    Platelets 295 140 - 450 10*3/mm3    Neutrophil % 58.3 42.7 - 76.0 %    Lymphocyte % 25.9 19.6 - 45.3 %    Monocyte % 7.6 5.0 - 12.0 %    Eosinophil %  7.2 (H) 0.3 - 6.2 %    Basophil % 0.7 0.0 - 1.5 %    Immature Grans % 0.3 0.0 - 0.5 %    Neutrophils, Absolute 4.21 1.70 - 7.00 10*3/mm3    Lymphocytes, Absolute 1.87 0.70 - 3.10 10*3/mm3    Monocytes, Absolute 0.55 0.10 - 0.90 10*3/mm3    Eosinophils, Absolute 0.52 (H) 0.00 - 0.40 10*3/mm3    Basophils, Absolute 0.05 0.00 - 0.20 10*3/mm3    Immature Grans, Absolute 0.02 0.00 - 0.05 10*3/mm3    nRBC 0.0 0.0 - 0.2 /100 WBC   Lactic Acid, Plasma    Collection Time: 03/15/24  4:52 AM    Specimen: Blood   Result Value Ref Range    Lactate 1.5 0.5 - 2.0 mmol/L   ECG 12 Lead Syncope    Collection Time: 03/15/24  5:06 AM   Result Value Ref Range    QT Interval 438 ms    QTC Interval 412 ms         RADIOLOGY  CT Facial Bones Without Contrast    Result Date: 3/15/2024  Patient: NIMESH ROCHA  Time Out: 05:23 Exam(s): CT FACIAL Without Contrast EXAM:   CT Maxillofacial Without Intravenous Contrast CLINICAL HISTORY:    Reason for exam: Syncope, head injury. TECHNIQUE:   Axial computed tomography images of the face without intravenous contrast.  CTDI is 26.15 mGy and DLP is 547.5 mGy-cm.  This CT exam was performed according to the principle of ALARA (As Low As Reasonably Achievable) by using one or more of the following dose reduction techniques: automated exposure control, adjustment of the mA and or kV according to patient size, and or use of iterative reconstruction technique. COMPARISON:   No relevant prior studies available. FINDINGS:   Bones joints:  No facial fracture detected.   Soft tissues:  Unremarkable.   Orbits:  No evidence of globe rupture.  Consider direct inspection if there is further concern.  No evidence of retrobulbar hematoma.   Sinuses:  Unremarkable.   Dental:  No evidence of traumatic dental avulsion or luxation. IMPRESSION:     1.  No facial fracture detected. 2.  No evidence of globe rupture.  Consider direct inspection if there is further concern. 3.  No evidence of traumatic dental avulsion or  luxation.     Electronically signed by Brandon Jefferson MD on 03-15-24 at 0523    CT Head Without Contrast    Result Date: 3/15/2024  Patient: NIMESH ROCHA  Time Out: 05:21 Exam(s): CT HEAD Without Contrast EXAM:   CT Head Without Intravenous Contrast CLINICAL HISTORY:    Reason for exam: Syncope, head injury. TECHNIQUE:   Axial computed tomography images of the head brain without intravenous contrast.  CTDI is 55.44 mGy and DLP is 1025.6 mGy-cm.  This CT exam was performed according to the principle of ALARA (As Low As Reasonably Achievable) by using one or more of the following dose reduction techniques: automated exposure control, adjustment of the mA and or kV according to patient size, and or use of iterative reconstruction technique. COMPARISON:   No relevant prior studies available. FINDINGS:   Brain:  No acute intracranial abnormality.  Consider MRI if there is further concern.  There are a few areas of decreased attenuation in the deep cerebral white matter consistent with mild small vessel ischemic degenerative changes.  The cerebral and cerebellar sulci are mildly prominent consistent with mild brain atrophy.  No hemorrhage.   Ventricles:  Unremarkable.  No ventriculomegaly.   Bones joints:  Unremarkable.  No acute fracture.   Soft tissues:  Unremarkable.   Vasculature:  Atherosclerotic disease.   Sinuses:  Unremarkable as visualized.   Mastoid air cells:  Unremarkable as visualized.  No mastoid effusion. IMPRESSION:       No acute intracranial abnormality.  Consider MRI if there is further concern.     Electronically signed by Brandon Jefferson MD on 03-15-24 at 0521       MEDICATIONS GIVEN IN ER  Medications   sodium chloride 0.9 % flush 10 mL (has no administration in time range)   sodium chloride 0.9 % flush 10 mL (has no administration in time range)   lactated ringers bolus 500 mL (500 mL Intravenous New Bag 3/15/24 8715)         ORDERS PLACED DURING THIS VISIT:  Orders Placed This Encounter    Procedures    CT Head Without Contrast    CT Facial Bones Without Contrast    XR Chest 1 View    [place] Draw    Comprehensive Metabolic Panel    Protime-INR    aPTT    Single High Sensitivity Troponin T    Lactic Acid, Plasma    CBC Auto Differential    Monitor Blood Pressure    Pulse Oximetry, Continuous    Orthostatic Vitals    LHA (on-call MD unless specified) Details    ECG 12 Lead Syncope    Home Sleep Study    Insert peripheral IV    Insert Peripheral IV    Initiate Observation Status    Green Top (Gel)    Lavender Top    Gold Top - SST    Light Blue Top    CBC & Differential         OUTPATIENT MEDICATION MANAGEMENT:  Current Facility-Administered Medications Ordered in Epic   Medication Dose Route Frequency Provider Last Rate Last Admin    sodium chloride 0.9 % flush 10 mL  10 mL Intravenous PRN Juan Ramon Flowers MD        sodium chloride 0.9 % flush 10 mL  10 mL Intravenous PRN Juan Ramon Flowers MD         Current Outpatient Medications Ordered in Epic   Medication Sig Dispense Refill    amLODIPine (NORVASC) 5 MG tablet Take 1 tablet by mouth Daily. 30 tablet 0    aspirin 81 MG EC tablet Take 1 tablet by mouth Daily. 90 tablet 1    folic acid (FOLVITE) 1 MG tablet Take 1 tablet by mouth Daily. 90 tablet 0    hydrALAZINE (APRESOLINE) 100 MG tablet Take 1 tablet by mouth Every 8 (Eight) Hours. 270 tablet 1    lisinopril (PRINIVIL,ZESTRIL) 40 MG tablet Take 1 tablet by mouth Daily. 90 tablet 1    melatonin 5 MG tablet tablet Take 1 tablet by mouth Every Night. 90 tablet 0    metoprolol succinate XL (TOPROL-XL) 50 MG 24 hr tablet Take 1 tablet by mouth Daily. 90 tablet 3    multivitamin (One-Daily Multi-Vitamin) tablet tablet Take 1 tablet by mouth Daily. 90 tablet 0    pantoprazole (PROTONIX) 40 MG EC tablet Take 1 tablet by mouth Every Morning. 30 tablet 1    rosuvastatin (Crestor) 20 MG tablet Take 2 tablets by mouth Every Night. 30 tablet 11    spironolactone (ALDACTONE) 25 MG tablet Take 1 tablet by mouth  Daily. 90 tablet 1    thiamine (VITAMIN B1) 100 MG tablet Take 1 tablet by mouth Daily. 90 tablet 0    vilazodone (Viibryd) 40 MG tablet tablet Take 1 tablet by mouth Daily. 90 tablet 1    vitamin B-12 (CYANOCOBALAMIN) 500 MCG tablet Take 1 tablet by mouth Daily. 90 tablet 0         PROCEDURES  Procedures            PROGRESS, DATA ANALYSIS, CONSULTS, AND MEDICAL DECISION MAKING  All labs have been independently interpreted by me.  All radiology studies have been reviewed by me. All EKG's have been independently viewed and interpreted by me.  Discussion below represents my analysis of pertinent findings related to patient's condition, differential diagnosis, treatment plan and final disposition.    Differential diagnosis includes but is not limited to vasovagal syncope, orthostatic hypotension, arrhythmia, hypoglycemia, seizure, acute MI, stroke.    Clinical Scores:                   ED Course as of 03/15/24 0543   Fri Mar 15, 2024   0441 Patient has evidence of craniofacial trauma on physical exam.  Ordering CT head to rule out intracranial bleeding injury.  Also will proceed with hematologic and metabolic and cardiac workup given his syncope complaint and repeated episodes of lightheadedness and dizziness.  Given his past history of stroke, I think it would be most beneficial to plan for admission to the hospital today with further hemodynamic monitoring.  He is agreeable to this plan. [SOPHIE]   0513 EKG         EKG time/Interp time: 0506/0508  Rhythm/Rate: Sinus rhythm, 53 bpm  P waves and KS: Present, 157 ms  QRS, axis: 97 ms, normal axis  ST and T waves: No ST segment elevations are present.  Independently interpreted by me contemporaneously with treatment   [SOPHIE]   0514 I independently interpreted the Chest X-ray and my findings are: No Pneumothorax, No Effusion, No Infiltrate   [SOPHIE]   0514 I independently interpreted the Head CT w/o Contrast and my findings are: No acute hemorrhage, no midline shift   [SOPHIE]   0540  Creatinine(!): 1.31 [SOPHIE]   0540 Creatinine elevated which is consistent with mild ALLISON.  IV fluids have been started here. [SOPHIE]   2741 I discussed with Sravanthi from [facility] about this patient.  She agrees to accept him to the hospitalist service on behalf of Dr. Dyer [SOPHIE]      ED Course User Index  [SOPHIE] Juan Ramon Flowers MD         AS OF 05:43 EDT VITALS:    BP - 120/84  HR - 53  TEMP - 98 °F (36.7 °C) (Oral)  O2 SATS - 96%    COMPLEXITY OF CARE  The patient requires admission.      DIAGNOSIS  Final diagnoses:   Syncope, unspecified syncope type   Abrasion of face, initial encounter   Injury of head, initial encounter   ALLISON (acute kidney injury)         DISPOSITION  ED Disposition       ED Disposition   Decision to Admit    Condition   --    Comment   Level of Care: Telemetry [5]   Diagnosis: Syncope [128501]   Admitting Physician: DOUG DYER [[id number]]   Attending Physician: DOUG DYER [[id number]]                  Please note that portions of this document were completed with a voice recognition program.    Note Disclaimer: At [facility], we believe that sharing information builds trust and better relationships. You are receiving this note because you recently visited [facility]. It is possible you will see health information before a provider has talked with you about it. This kind of information can be easy to misunderstand. To help you fully understand what it means for your health, we urge you to discuss this note with your provider.         Juan Ramon Flowers MD  03/15/24 2955

## 2024-03-16 VITALS
BODY MASS INDEX: 30.16 KG/M2 | RESPIRATION RATE: 18 BRPM | TEMPERATURE: 98.1 F | DIASTOLIC BLOOD PRESSURE: 93 MMHG | HEART RATE: 53 BPM | SYSTOLIC BLOOD PRESSURE: 128 MMHG | HEIGHT: 74 IN | OXYGEN SATURATION: 97 % | WEIGHT: 235 LBS

## 2024-03-16 LAB
ANION GAP SERPL CALCULATED.3IONS-SCNC: 13.8 MMOL/L (ref 5–15)
BUN SERPL-MCNC: 13 MG/DL (ref 6–20)
BUN/CREAT SERPL: 10.3 (ref 7–25)
CALCIUM SPEC-SCNC: 8.2 MG/DL (ref 8.6–10.5)
CHLORIDE SERPL-SCNC: 110 MMOL/L (ref 98–107)
CO2 SERPL-SCNC: 21.2 MMOL/L (ref 22–29)
CREAT SERPL-MCNC: 1.26 MG/DL (ref 0.76–1.27)
EGFRCR SERPLBLD CKD-EPI 2021: 66.9 ML/MIN/1.73
GLUCOSE SERPL-MCNC: 105 MG/DL (ref 65–99)
POTASSIUM SERPL-SCNC: 4.7 MMOL/L (ref 3.5–5.2)
SODIUM SERPL-SCNC: 145 MMOL/L (ref 136–145)

## 2024-03-16 PROCEDURE — 80048 BASIC METABOLIC PNL TOTAL CA: CPT | Performed by: HOSPITALIST

## 2024-03-16 PROCEDURE — G0378 HOSPITAL OBSERVATION PER HR: HCPCS

## 2024-03-16 RX ORDER — METOPROLOL SUCCINATE 25 MG/1
25 TABLET, EXTENDED RELEASE ORAL DAILY
Qty: 30 TABLET | Refills: 0 | Status: SHIPPED | OUTPATIENT
Start: 2024-03-17 | End: 2024-03-22 | Stop reason: SDUPTHER

## 2024-03-16 RX ADMIN — Medication 500 MCG: at 08:21

## 2024-03-16 RX ADMIN — Medication 10 ML: at 08:23

## 2024-03-16 RX ADMIN — PANTOPRAZOLE SODIUM 40 MG: 40 TABLET, DELAYED RELEASE ORAL at 06:03

## 2024-03-16 RX ADMIN — FOLIC ACID 1 MG: 1 TABLET ORAL at 08:21

## 2024-03-16 RX ADMIN — Medication 100 MG: at 08:21

## 2024-03-16 RX ADMIN — AMLODIPINE BESYLATE 5 MG: 5 TABLET ORAL at 08:20

## 2024-03-16 RX ADMIN — ASPIRIN 81 MG: 81 TABLET, COATED ORAL at 08:20

## 2024-03-16 RX ADMIN — Medication 1 TABLET: at 08:23

## 2024-03-16 RX ADMIN — VILAZODONE HYDROCHLORIDE 40 MG: 40 TABLET, FILM COATED ORAL at 08:21

## 2024-03-16 RX ADMIN — METOPROLOL SUCCINATE 25 MG: 25 TABLET, EXTENDED RELEASE ORAL at 08:21

## 2024-03-16 NOTE — PLAN OF CARE
Problem: Adult Inpatient Plan of Care  Goal: Plan of Care Review  Outcome: Ongoing, Not Progressing  Flowsheets (Taken 3/16/2024 0502)  Progress: improving  Plan of Care Reviewed With: patient  Goal: Patient-Specific Goal (Individualized)  Outcome: Ongoing, Not Progressing  Goal: Absence of Hospital-Acquired Illness or Injury  Outcome: Ongoing, Not Progressing  Intervention: Identify and Manage Fall Risk  Recent Flowsheet Documentation  Taken 3/16/2024 0200 by Nicole Lindo RN  Safety Promotion/Fall Prevention: safety round/check completed  Taken 3/16/2024 0000 by Nicole Lindo RN  Safety Promotion/Fall Prevention:   safety round/check completed   activity supervised  Taken 3/15/2024 2200 by Nicole Lindo RN  Safety Promotion/Fall Prevention:   safety round/check completed   activity supervised  Taken 3/15/2024 2000 by Nicole Lindo RN  Safety Promotion/Fall Prevention: safety round/check completed  Intervention: Prevent Skin Injury  Recent Flowsheet Documentation  Taken 3/16/2024 0200 by Nicole Lindo RN  Body Position: position changed independently  Taken 3/16/2024 0000 by Nicole Lindo RN  Body Position: position changed independently  Taken 3/15/2024 2200 by Nicole Lindo RN  Body Position: position changed independently  Taken 3/15/2024 2000 by Nicole Lindo RN  Body Position: position changed independently  Intervention: Prevent and Manage VTE (Venous Thromboembolism) Risk  Recent Flowsheet Documentation  Taken 3/16/2024 0200 by Nicole Lindo RN  Activity Management: up ad chinyere  Taken 3/16/2024 0000 by Nicole Lindo RN  Activity Management: up ad chinyere  Taken 3/15/2024 2200 by Nicole Lindo RN  Activity Management: up ad chinyere  Taken 3/15/2024 2000 by Nicole Lindo RN  Activity Management: up ad chinyere  Intervention: Prevent Infection  Recent Flowsheet Documentation  Taken 3/16/2024 0200 by Nicole Lindo RN  Infection Prevention:   single patient room provided   rest/sleep promoted   hand hygiene  promoted   environmental surveillance performed  Taken 3/16/2024 0000 by Nicole Lindo RN  Infection Prevention:   single patient room provided   rest/sleep promoted   hand hygiene promoted   environmental surveillance performed  Taken 3/15/2024 2200 by Nicole Lindo RN  Infection Prevention:   single patient room provided   rest/sleep promoted   hand hygiene promoted   equipment surfaces disinfected  Taken 3/15/2024 2000 by Nicole Lindo RN  Infection Prevention:   rest/sleep promoted   single patient room provided   hand hygiene promoted   environmental surveillance performed  Goal: Optimal Comfort and Wellbeing  Outcome: Ongoing, Not Progressing  Intervention: Provide Person-Centered Care  Recent Flowsheet Documentation  Taken 3/15/2024 2000 by Nicole Lindo RN  Trust Relationship/Rapport:   care explained   choices provided   emotional support provided   questions answered  Goal: Readiness for Transition of Care  Outcome: Ongoing, Not Progressing     Problem: Fall Injury Risk  Goal: Absence of Fall and Fall-Related Injury  Outcome: Ongoing, Not Progressing  Intervention: Identify and Manage Contributors  Recent Flowsheet Documentation  Taken 3/15/2024 2200 by Nicole Lindo RN  Medication Review/Management: medications reviewed  Taken 3/15/2024 2000 by Nicole Lindo RN  Medication Review/Management: medications reviewed  Intervention: Promote Injury-Free Environment  Recent Flowsheet Documentation  Taken 3/16/2024 0200 by Nicole Lindo RN  Safety Promotion/Fall Prevention: safety round/check completed  Taken 3/16/2024 0000 by Nicole Lindo RN  Safety Promotion/Fall Prevention:   safety round/check completed   activity supervised  Taken 3/15/2024 2200 by Nicole Lindo RN  Safety Promotion/Fall Prevention:   safety round/check completed   activity supervised  Taken 3/15/2024 2000 by Nicole Lindo RN  Safety Promotion/Fall Prevention: safety round/check completed   Goal Outcome Evaluation:  Plan of Care  Reviewed With: patient        Progress: improving no any events overnight / VS WNL

## 2024-03-16 NOTE — PLAN OF CARE
Problem: Adult Inpatient Plan of Care  Goal: Plan of Care Review  Outcome: Ongoing, Progressing     Problem: Adult Inpatient Plan of Care  Goal: Absence of Hospital-Acquired Illness or Injury  Intervention: Identify and Manage Fall Risk  Recent Flowsheet Documentation  Taken 3/16/2024 0759 by Analy Hahn, RN  Safety Promotion/Fall Prevention: safety round/check completed     Problem: Fall Injury Risk  Goal: Absence of Fall and Fall-Related Injury  Intervention: Promote Injury-Free Environment  Recent Flowsheet Documentation  Taken 3/16/2024 7768 by Analy Hahn, RN  Safety Promotion/Fall Prevention: safety round/check completed   Goal Outcome Evaluation:

## 2024-03-16 NOTE — CASE MANAGEMENT/SOCIAL WORK
Case Management Discharge Note      Final Note: home    Provided Post Acute Provider List?: N/A  Provided Post Acute Provider Quality & Resource List?: N/A    Selected Continued Care - Discharged on 3/16/2024 Admission date: 3/15/2024 - Discharge disposition: Home or Self Care      Destination    No services have been selected for the patient.                Durable Medical Equipment    No services have been selected for the patient.                Dialysis/Infusion    No services have been selected for the patient.                Home Medical Care    No services have been selected for the patient.                Therapy    No services have been selected for the patient.                Community Resources    No services have been selected for the patient.                Community & DME    No services have been selected for the patient.                         Final Discharge Disposition Code: 01 - home or self-care

## 2024-03-16 NOTE — DISCHARGE SUMMARY
Patient Name: Flako Coreas  : 1965  MRN: [id number]    Date of Admission: 3/15/2024  Date of Discharge:  3/16/2024  Primary Care Physician: Akash Rodriguez Jr., DO      Chief Complaint:   Syncope      Discharge Diagnoses     Active Hospital Problems    Diagnosis  POA    **Syncope [R55]  Yes    ALLISON (acute kidney injury) [N17.9]  Yes    History of CVA with residual deficit [I69.30]  Not Applicable    HTN (hypertension) [I10]  Yes    Lepe's esophagus without dysplasia [K22.70]  Yes    GERD (gastroesophageal reflux disease) [K21.9]  Yes    Mixed anxiety depressive disorder [F41.8]  Yes      Resolved Hospital Problems   No resolved problems to display.        Hospital Course     Mr. Coreas is a 58 y.o. male with a history of hypertension and recent stroke which occurred about 7 months ago.  See my H&P yesterday for extensive review of his past history.  He has had quite a bit of issues with dizziness, ataxia and syncope or near syncope since that time.  He has been admitted a couple times for that.  He also had a recent rectal bleed and underwent endoscopy which showed Lepe's esophagus and diverticulosis but no active bleeds.  At that time he was taken off Brilinta and has been continued only on aspirin.  He came back with recurrent syncope as described in the H&P.  He was initially slightly bradycardic but he already has an implanted loop recorder which did not show any severe bradycardia or heart block.  He recently did have some notation of brief episodes of A-fib but was not felt to warrant anticoagulation per cardiology notes.  He has been having chronic diarrhea and does not eat well.  He had some mild dehydration and ALLISON on admission with creatinine of 1.31 and BUN of 18.  He was hydrated overnight with some slight improvement in that.  I held his ACE inhibitor but I would like for him to stay on that long-term.  Overall his BP was somewhat erratic but pretty well-controlled on just  metoprolol and amlodipine.  In fact the metoprolol is only half of his normal dose due to the bradycardia.  His symptoms sound to be orthostatic although he did not meet orthostatic criteria when we checked him here.  Oddly during one of his hospital stays last year he had refractory hypertension and actually underwent workup for secondary causes including catecholamines, renal artery Doppler and renin and aldosterone levels all of which were normal.  Currently, I think he would benefit from less BP meds at discharge and am planning to send him only with lisinopril and metoprolol as detailed below.  I told him to monitor BP closely and consider restarting amlodipine if he is getting systolic readings over 140 consistently.  Would like to hold hydralazine indefinitely at this point.  Cardiology and neurology saw him here with no further recommendations.  He can follow-up with cardiology on the outpatient setting.      Day of Discharge     Subjective:  No complaints.  He is feeling better and walked to the bathroom better.  Feels ready to discharge    Physical Exam:  Temp:  [97.7 °F (36.5 °C)-99.2 °F (37.3 °C)] 98.1 °F (36.7 °C)  Heart Rate:  [49-53] 53  Resp:  [18] 18  BP: (108-128)/(73-93) 128/93  Body mass index is 30.17 kg/m².  Physical Exam  Vitals reviewed.   Constitutional:       Appearance: He is obese. He is not ill-appearing.   HENT:      Head:      Comments: Abrasions on his nose  Cardiovascular:      Rate and Rhythm: Normal rate and regular rhythm.   Pulmonary:      Effort: No respiratory distress.      Breath sounds: Normal breath sounds.   Abdominal:      General: Bowel sounds are normal. There is no distension.      Palpations: Abdomen is soft.      Tenderness: There is no abdominal tenderness.   Neurological:      Mental Status: He is alert and oriented to person, place, and time.   Psychiatric:         Mood and Affect: Mood normal.         Consultants     Consult Orders (all) (From admission, onward)        Start     Ordered    03/15/24 0956  Inpatient Neurology Consult General  Once        Specialty:  Neurology  Provider:  Trell Murphy MD    03/15/24 0956    03/15/24 0702  Inpatient Cardiology Consult  IN AM        Specialty:  Cardiology  Provider:  Chinmay Taylor MD    03/15/24 0545    03/15/24 0517  [facility] (on-call MD unless specified) Details  Once        Specialty:  Hospitalist  Provider:  (Not yet assigned)    03/15/24 0516                  Procedures       Imaging Results (All)       Procedure Component Value Units Date/Time    XR Chest 1 View [[id number]] Collected: 03/15/24 0645     Updated: 03/15/24 0645    Narrative:        Patient: NIMESH ROCHA  Time Out: 06:45  Exam(s): XR CXR 1 VIEW     EXAM:    XR Chest, 1 View    CLINICAL HISTORY:     Reason for exam: Syncope.    TECHNIQUE:    Frontal view of the chest.    COMPARISON:  Single view of the chest 12 10 2023    IMPRESSION:     1.  Interval placement of loop recorder.  2.  No acute cardiopulmonary abnormality.    Impression:          Electronically signed by Brandon Jefferson MD on 03-15-24 at 0645    CT Facial Bones Without Contrast [[id number]] Collected: 03/15/24 0524     Updated: 03/15/24 0524    Narrative:        Patient: NIMESH ROCHA  Time Out: 05:23  Exam(s): CT FACIAL Without Contrast     EXAM:    CT Maxillofacial Without Intravenous Contrast    CLINICAL HISTORY:     Reason for exam: Syncope, head injury.    TECHNIQUE:    Axial computed tomography images of the face without intravenous   contrast.  CTDI is 26.15 mGy and DLP is 547.5 mGy-cm.  This CT exam was   performed according to the principle of ALARA (As Low As Reasonably   Achievable) by using one or more of the following dose reduction   techniques: automated exposure control, adjustment of the mA and or kV   according to patient size, and or use of iterative reconstruction   technique.    COMPARISON:    No relevant prior studies available.    FINDINGS:    Bones joints:   No facial fracture detected.    Soft tissues:  Unremarkable.    Orbits:  No evidence of globe rupture.  Consider direct inspection if   there is further concern.  No evidence of retrobulbar hematoma.    Sinuses:  Unremarkable.    Dental:  No evidence of traumatic dental avulsion or luxation.    IMPRESSION:       1.  No facial fracture detected.  2.  No evidence of globe rupture.  Consider direct inspection if there is   further concern.  3.  No evidence of traumatic dental avulsion or luxation.      Impression:          Electronically signed by Brandon Jefferson MD on 03-15-24 at 0523    CT Head Without Contrast [[id number]] Collected: 03/15/24 0522     Updated: 03/15/24 0522    Narrative:        Patient: NIMESH ROCHA  Time Out: 05:21  Exam(s): CT HEAD Without Contrast     EXAM:    CT Head Without Intravenous Contrast    CLINICAL HISTORY:     Reason for exam: Syncope, head injury.    TECHNIQUE:    Axial computed tomography images of the head brain without intravenous   contrast.  CTDI is 55.44 mGy and DLP is 1025.6 mGy-cm.  This CT exam was   performed according to the principle of ALARA (As Low As Reasonably   Achievable) by using one or more of the following dose reduction   techniques: automated exposure control, adjustment of the mA and or kV   according to patient size, and or use of iterative reconstruction   technique.    COMPARISON:    No relevant prior studies available.    FINDINGS:    Brain:  No acute intracranial abnormality.  Consider MRI if there is   further concern.  There are a few areas of decreased attenuation in the   deep cerebral white matter consistent with mild small vessel   ischemic degenerative changes.  The cerebral and cerebellar sulci are   mildly prominent consistent with mild brain atrophy.  No hemorrhage.    Ventricles:  Unremarkable.  No ventriculomegaly.    Bones joints:  Unremarkable.  No acute fracture.    Soft tissues:  Unremarkable.    Vasculature:  Atherosclerotic disease.     Sinuses:  Unremarkable as visualized.    Mastoid air cells:  Unremarkable as visualized.  No mastoid effusion.    IMPRESSION:         No acute intracranial abnormality.  Consider MRI if there is further   concern.      Impression:          Electronically signed by Brandon Jefferson MD on 03-15-24 at 0521            Results for orders placed during the hospital encounter of 08/19/23    Adult Transesophageal Echo ([facility]GHANN) W/ Cont if Necessary Per Protocol    Interpretation Summary    Left ventricular systolic function is hyperdynamic (EF > 70%).    Left ventricular wall thickness is consistent with mild to moderate concentric hypertrophy.    Left ventricular diastolic function is consistent with (grade I) impaired relaxation.    Normal right ventricular cavity size and systolic function noted.    The left atrial cavity is mildly dilated.    No evidence of a left atrial appendage thrombus was present    No evidence of a patent foramen ovale. Saline test results are negative.    There are very mild plaques in the distal descending thoracic aorta. There are no plaques in the aortic arch or ascending aorta    There is no evidence of pericardial effusion    Pertinent Labs     Results from last 7 days   Lab Units 03/15/24  0423   WBC 10*3/mm3 7.22   HEMOGLOBIN g/dL 11.4*   PLATELETS 10*3/mm3 295     Results from last 7 days   Lab Units 03/16/24  0618 03/15/24  0423   SODIUM mmol/L 145 143   POTASSIUM mmol/L 4.7 3.6   CHLORIDE mmol/L 110* 110*   CO2 mmol/L 21.2* 21.0*   BUN mg/dL 13 18   CREATININE mg/dL 1.26 1.31*   GLUCOSE mg/dL 105* 179*   EGFR mL/min/1.73 66.9 63.9     Results from last 7 days   Lab Units 03/15/24  0423   ALBUMIN g/dL 4.4   BILIRUBIN mg/dL 0.2   ALK PHOS U/L 79   AST (SGOT) U/L 14   ALT (SGPT) U/L 22     Results from last 7 days   Lab Units 03/16/24  0618 03/15/24  0423   CALCIUM mg/dL 8.2* 9.1   ALBUMIN g/dL  --  4.4       Results from last 7 days   Lab Units 03/15/24  0423   HSTROP T ng/L 9        "    Invalid input(s): \"LDLCALC\"          Test Results Pending at Discharge       Discharge Details        Discharge Medications        Changes to Medications        Instructions Start Date   metoprolol succinate XL 25 MG 24 hr tablet  Commonly known as: TOPROL-XL  What changed:   medication strength  how much to take   25 mg, Oral, Daily   Start Date: March 17, 2024            Continue These Medications        Instructions Start Date   aspirin 81 MG EC tablet   81 mg, Oral, Daily      folic acid 1 MG tablet  Commonly known as: FOLVITE   1 mg, Oral, Daily      lisinopril 40 MG tablet  Commonly known as: PRINIVIL,ZESTRIL   40 mg, Oral, Daily      melatonin 5 MG tablet tablet   5 mg, Oral, Nightly      One-Daily Multi-Vitamin tablet tablet  Generic drug: multivitamin   1 tablet, Oral, Daily      pantoprazole 40 MG EC tablet  Commonly known as: PROTONIX   40 mg, Oral, Every Early Morning      rosuvastatin 20 MG tablet  Commonly known as: Crestor   40 mg, Oral, Nightly      spironolactone 25 MG tablet  Commonly known as: ALDACTONE   25 mg, Oral, Daily      thiamine 100 MG tablet  Commonly known as: VITAMIN B1   100 mg, Oral, Daily      vilazodone 40 MG tablet tablet  Commonly known as: Viibryd   40 mg, Oral, Daily      vitamin B-12 500 MCG tablet  Commonly known as: CYANOCOBALAMIN   500 mcg, Oral, Daily             Stop These Medications      amLODIPine 5 MG tablet  Commonly known as: NORVASC     hydrALAZINE 100 MG tablet  Commonly known as: APRESOLINE              No Known Allergies    Discharge Disposition:  Home or Self Care      Discharge Diet:  Diet Order   Procedures    Diet: Cardiac; Healthy Heart (2-3 Na+); Fluid Consistency: Thin (IDDSI 0)       Discharge Activity:       CODE STATUS:    Code Status and Medical Interventions:   Ordered at: 03/15/24 0545     Code Status (Patient has no pulse and is not breathing):    CPR (Attempt to Resuscitate)     Medical Interventions (Patient has pulse or is breathing):    " Full Support       Future Appointments   Date Time Provider Department Center   5/20/2024 11:00 AM Akash Rodriguez Jr., DO [facility]   6/3/2024  8:30 AM Pan Leon MD [facility]      Follow-up Information       Akash Rodriguez Jr., DO .    Specialties: Sports Medicine, Family Medicine, Emergency Medicine  Contact information:  [address] [place], [place] [id number] [phone]                             Time Spent on Discharge:  Greater than 30 minutes      Brandon Rosado MD  [facility]  03/16/24  14:07 EDT

## 2024-03-17 ENCOUNTER — READMISSION MANAGEMENT (OUTPATIENT)
Dept: CALL CENTER | Facility: HOSPITAL | Age: 59
End: 2024-03-17
Payer: MEDICAID

## 2024-03-17 NOTE — OUTREACH NOTE
Prep Survey      Flowsheet Row Responses   [facility] patient discharged from? [place]   Is LACE score < 7 ? Yes   Eligibility [facility]   Date of Admission 03/15/24   Date of Discharge 03/16/24   Discharge Disposition Home or Self Care   Discharge diagnosis Syncope   Does the patient have one of the following disease processes/diagnoses(primary or secondary)? Other   Does the patient have Home health ordered? No   Is there a DME ordered? No   Medication alerts for this patient see avs   Prep survey completed? Yes            Veronika SALMERON - Registered Nurse

## 2024-03-18 ENCOUNTER — TRANSITIONAL CARE MANAGEMENT TELEPHONE ENCOUNTER (OUTPATIENT)
Dept: CALL CENTER | Facility: HOSPITAL | Age: 59
End: 2024-03-18
Payer: MEDICAID

## 2024-03-18 RX ORDER — LISINOPRIL 40 MG/1
40 TABLET ORAL DAILY
Qty: 90 TABLET | Refills: 1 | Status: SHIPPED | OUTPATIENT
Start: 2024-03-18

## 2024-03-18 RX ORDER — CARVEDILOL 6.25 MG/1
6.25 TABLET ORAL 2 TIMES DAILY WITH MEALS
Qty: 180 TABLET | Refills: 1 | OUTPATIENT
Start: 2024-03-18

## 2024-03-18 RX ORDER — HYDRALAZINE HYDROCHLORIDE 100 MG/1
100 TABLET, FILM COATED ORAL EVERY 8 HOURS
Qty: 270 TABLET | Refills: 1 | OUTPATIENT
Start: 2024-03-18

## 2024-03-18 RX ORDER — SPIRONOLACTONE 25 MG/1
25 TABLET ORAL DAILY
Qty: 90 TABLET | Refills: 1 | Status: SHIPPED | OUTPATIENT
Start: 2024-03-18 | End: 2024-03-22 | Stop reason: SDUPTHER

## 2024-03-18 NOTE — TELEPHONE ENCOUNTER
I agree with Dr. Langford in that the rare, short episodes of A-fib just lasting a few minutes are #1 would not appear to be contributing to his syncope and #2 not long enough to be significant and causing embolic CVA.  No changes to management for now

## 2024-03-18 NOTE — OUTREACH NOTE
Call Center TCM Note      Flowsheet Row Responses   [facility] patient discharged from? [place]   Does the patient have one of the following disease processes/diagnoses(primary or secondary)? Other   TCM attempt successful? Yes   Call start time 1304   Call end time 1308   Meds reviewed with patient/caregiver? Yes   Is the patient having any side effects they believe may be caused by any medication additions or changes? No   Does the patient have all medications ordered at discharge? Yes   Is the patient taking all medications as directed (includes completed medication regime)? Yes   Does the patient have an appointment with their PCP within 7-14 days of discharge? No   Nursing Interventions Routed TCM call to PCP office, Patient declined scheduling/rescheduling appointment at this time   Has home health visited the patient within 72 hours of discharge? N/A   Psychosocial issues? No   Did the patient receive a copy of their discharge instructions? Yes   Nursing interventions Reviewed instructions with patient   What is the patient's perception of their health status since discharge? Improving   Is the patient/caregiver able to teach back signs and symptoms related to disease process for when to call PCP? Yes   Is the patient/caregiver able to teach back signs and symptoms related to disease process for when to call 911? Yes   Is the patient/caregiver able to teach back the hierarchy of who to call/visit for symptoms/problems? PCP, Specialist, Home health nurse, Urgent Care, ED, 911 Yes   If the patient is a current smoker, are they able to teach back resources for cessation? Not a smoker   TCM call completed? Yes   Wrap up additional comments Patient reports he has added Amlodipine back due to elevated BP, has had one episode of dizziness but was able to lay down and it resolved   Call end time 1308   Would this patient benefit from a Referral to [facility] Social Work? No   Is the patient interested in additional  calls from an ambulatory ? No            Jess Jolly RN    3/18/2024, 13:08 EDT

## 2024-03-19 ENCOUNTER — TELEPHONE (OUTPATIENT)
Dept: SPORTS MEDICINE | Facility: CLINIC | Age: 59
End: 2024-03-19
Payer: MEDICAID

## 2024-03-19 ENCOUNTER — NURSE TRIAGE (OUTPATIENT)
Dept: CALL CENTER | Facility: HOSPITAL | Age: 59
End: 2024-03-19
Payer: MEDICAID

## 2024-03-19 NOTE — TELEPHONE ENCOUNTER
Called patient to schedule hospital follow up. Patient initially stated couldn't get in until April. Advised provider would like to see him this week due to hospital admit. Patient scheduled for Friday and hopes he can make it.     Patient stated was given medication for:     HYDROLOZINE and ALODAPINE     Says added one and didn't help, added 2nd and seems to have lowered his BP. Per M.A. patient should continue per hospital discharge instructions until can get in to discuss further with Dr. Rodriguez.     Patient states BP was 180/120 as of this morning.(Sending to clinical in case that reading needs any further action)

## 2024-03-19 NOTE — TELEPHONE ENCOUNTER
I called and spoke with patient and I relayed all information provided by Dr. Rodriguez. He has enough medication at this time until he can get in with Dr. Rodriguez and get on a set path for his medication. His BP was 160/101 10 minutes prior to my call. He does not have any other severe symptoms and is aware to go to the ED if sxs worsen. He currently only has the blurred vision as previously discussed. I informed patient that if he can get here sooner than Friday for his appointment, we will work him in and I informed him of Dr. Rodriguez schedule. All information was verbally understood.     Thanks  Priti

## 2024-03-19 NOTE — TELEPHONE ENCOUNTER
"Reason for Disposition   Systolic BP >= 180 OR Diastolic >= 110    Additional Information   Negative: Sounds like a life-threatening emergency to the triager   Negative: Symptom is main concern (e.g., headache, chest pain)   Negative: Low blood pressure is main concern   Negative: Systolic BP >= 160 OR Diastolic >= 100, and any cardiac (e.g., breathing difficulty, chest pain) or neurologic symptoms (e.g., new-onset blurred or double vision)   Negative: Pregnant 20 or more weeks (or postpartum < 6 weeks) with new hand or face swelling   Negative: Pregnant 20 or more weeks (or postpartum < 6 weeks) and Systolic BP >= 160 OR Diastolic >= 110   Negative: Patient sounds very sick or weak to the triager   Negative: Systolic BP >= 200 OR Diastolic >= 120 and having NO cardiac or neurologic symptoms   Negative: Pregnant 20 or more weeks (or postpartum < 6 weeks) with Systolic BP >= 140 OR Diastolic >= 90   Negative: Systolic BP >= 180 OR Diastolic >= 110, and missed most recent dose of blood pressure medication    Answer Assessment - Initial Assessment Questions  1. BLOOD PRESSURE: \"What is the blood pressure?\" \"Did you take at least two measurements 5 minutes apart?\"      180/120  2. ONSET: \"When did you take your blood pressure?\"      Discharged 3/16 and been going up each day  3. HOW: \"How did you take your blood pressure?\" (e.g., automatic home BP monitor, visiting nurse)      home  4. HISTORY: \"Do you have a history of high blood pressure?\"      Yes, change to meds on discharge, hold amlodipine and hydralazine, increased metoprolol  5. MEDICINES: \"Are you taking any medicines for blood pressure?\" \"Have you missed any doses recently?\"      See above  6. OTHER SYMPTOMS: \"Do you have any symptoms?\" (e.g., blurred vision, chest pain, difficulty breathing, headache, weakness)      headache  7. PREGNANCY: \"Is there any chance you are pregnant?\" \"When was your last menstrual period?\"      na    Protocols used: Blood Pressure " - High-ADULT-[place]

## 2024-03-19 NOTE — TELEPHONE ENCOUNTER
Outgoing call to the patient for clarification. He stated his most recent BP reading within the last hour is 157/108.   He stated he has added back the Amlodipine and Hydralazine because just taking the Lisinopril and Metoprolol was not working.   He confirmed he currently taking Amlodipine, Hydralazine, Lisinopril, Metoprolol, Spironolactone, and Aspirin.   He stated he is not sure if he will be able to make it to his appointment, has not been able to drive, has blurry vision.   I informed I will send this message to Dr. Rodriguez for review.     Thanks  Priti

## 2024-03-20 ENCOUNTER — TELEMEDICINE (OUTPATIENT)
Dept: PSYCHIATRY | Facility: CLINIC | Age: 59
End: 2024-03-20
Payer: MEDICAID

## 2024-03-20 DIAGNOSIS — F41.1 GAD (GENERALIZED ANXIETY DISORDER): Primary | ICD-10-CM

## 2024-03-20 DIAGNOSIS — F32.0 CURRENT MILD EPISODE OF MAJOR DEPRESSIVE DISORDER WITHOUT PRIOR EPISODE: ICD-10-CM

## 2024-03-20 NOTE — PROGRESS NOTES
This provider is located at the [facility] (through [facility]), [address], [place] [address] using a secure MyChart Video Visit through [employer]. Patient is being seen remotely via telehealth at home address in [place] and stated they are in a secure environment for this session. The patient's condition being diagnosed/treated is appropriate for telemedicine. The provider identified herself as well as her credentials. The patient, and/or patients guardian, consent to be seen remotely, and when consent is given they understand that the consent allows for patient identifiable information to be sent to a third party as needed. They may refuse to be seen remotely at any time. The electronic data is encrypted and password protected, and the patient and/or guardian has been advised of the potential risks to privacy not withstanding such measures.     You have chosen to receive care through a telehealth visit.  Do you consent to use a video/audio connection for your medical care today? Yes    Subjective   Flako Coreas is a 58 y.o. male who presents today for initial evaluation        Time In: 11:01 EDT   Time out: 11:43 AM EST  Name of PCP: Akash Rodriguez Jr., DO   Referral source: Latisha Lindsey, APRN  [address] [address]     Chief Complaint:   Chief Complaint   Patient presents with    Anxiety    Depression      [facility] RESOURCE #: [phone]    Patient adamantly and convincingly denies current suicidal or homicidal ideation or perceptual disturbance.    Childhood Experiences:   Has patient experienced a major accident or tragic events as a child? no       Has patient experienced any other significant life events or trauma (such as verbal, physical, sexual abuse)? no      Significant Life Events:  Has patient been through or witnessed a divorce? yes      Has patient experienced a death / loss of relationship? yes      Has  patient experienced a major accident or tragic events? 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      Has patient experienced any other significant life events or trauma (such as verbal, physical, sexual abuse)? no    Social History:   Social History     Socioeconomic History    Marital status: Single   Tobacco Use    Smoking status: Never    Smokeless tobacco: Never   Vaping Use    Vaping status: Never Used   Substance and Sexual Activity    Alcohol use: Not Currently     Comment: 12 beers on the weekend    Drug use: Not Currently     Frequency: 1.0 times per week     Types: Marijuana     Comment: patient states he has been smoking marijuania daily x 2 weeks    Sexual activity: Not Currently     Partners: Female     Birth control/protection: Other, Birth control pill     Marital Status: single    Patient's current living situation: Patient lives alone    Support system:  son 25yo, some friends , brother    Difficulty getting along with peers: no    Difficulty making new friendships: no    Difficulty maintaining friendships: no    Close with family members: no    Religous: no    Work History:  Highest level of education obtained: college    Ever been active duty in the ? 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      Past Medical History:  Past Medical History:   Diagnosis Date    ADHD (attention deficit hyperactivity disorder)     On going issue    Anxiety     Lepe's esophagus     Benign prostatic hyperplasia Surgery in 12/2021    Brain concussion 11/2023    Clotting disorder Intestinal    Depression     Diverticulitis     Diverticulosis     Duodenitis     Enteritis     Failure to thrive (child)     Family history of blood clots     Fracture of wrist 1985    Fracture, foot 2019    GERD (gastroesophageal reflux disease)     Hyperlipidemia     Hypertension     Hypertensive emergency     Irritable bowel syndrome 2017    Low  testosterone     Microcytosis     Pancreatitis     Pneumonia     PONV (postoperative nausea and vomiting)     Seasonal affective disorder     Shoulder injury     Stroke     Unexplained weight loss     Visual impairment 8/2023       Past Surgical History:  Past Surgical History:   Procedure Laterality Date    CARDIAC ELECTROPHYSIOLOGY PROCEDURE N/A 01/24/2024    Procedure: Loop insertion- [employer]onic LINQ;  Surgeon: Kaela Walker MD;  Location: [facility] CATH INVASIVE LOCATION;  Service: Cardiovascular;  Laterality: N/A;    COLON SURGERY      COLONOSCOPY      COLONOSCOPY N/A 12/11/2022    Procedure: COLONOSCOPY INTO CECUM WITH HOT SNARE POLYPECTOMY;  Surgeon: Albert Gallo MD;  Location: [facility] ENDOSCOPY;  Service: Gastroenterology;  Laterality: N/A;  PRE- GI BLEED  POST- DIVERTICULOSIS, POLYP    COLONOSCOPY N/A 02/14/2024    Procedure: COLONOSCOPY into cecum/terminal ileum;  Surgeon: Albert Gallo MD;  Location: [facility] ENDOSCOPY;  Service: Gastroenterology;  Laterality: N/A;  diverticulosis, hemorrhoids    ENDOSCOPY N/A 12/10/2022    Procedure: ESOPHAGOGASTRODUODENOSCOPY;  Surgeon: Albert Gallo MD;  Location: [facility] ENDOSCOPY;  Service: Gastroenterology;  Laterality: N/A;  PRE- DARK STOOLS  POST- BARRETTS ESOPHAGUS    ENDOSCOPY N/A 02/14/2024    Procedure: ESOPHAGOGASTRODUODENOSCOPY with biopsy;  Surgeon: Albert Gallo MD;  Location: [facility] ENDOSCOPY;  Service: Gastroenterology;  Laterality: N/A;  long segment wilson's, hiatal hernia    FRACTURE SURGERY Right 1980    for broken arm    FRACTURE SURGERY Right     for broken hand    HAND SURGERY  1990    INCISION AND DRAINAGE ABSCESS  2015    anal    PROSTATE SURGERY      SHOULDER SURGERY  2022    SMALL INTESTINE SURGERY      TONSILLECTOMY      TRIGGER POINT INJECTION  2017       Physical Exam:   There were no vitals taken for this visit. There is no height or weight on file to calculate BMI.     History of  psychiatric treatment or hospitalization:  No        Allergy:   No Known Allergies     Current Medications:   Current Outpatient Medications   Medication Sig Dispense Refill    aspirin 81 MG EC tablet Take 1 tablet by mouth Daily. 90 tablet 1    folic acid (FOLVITE) 1 MG tablet Take 1 tablet by mouth Daily. 90 tablet 0    lisinopril (PRINIVIL,ZESTRIL) 40 MG tablet TAKE 1 TABLET BY MOUTH DAILY 90 tablet 1    melatonin 5 MG tablet tablet Take 1 tablet by mouth Every Night. 90 tablet 0    metoprolol succinate XL (TOPROL-XL) 25 MG 24 hr tablet Take 1 tablet by mouth Daily. 30 tablet 0    multivitamin (One-Daily Multi-Vitamin) tablet tablet Take 1 tablet by mouth Daily. 90 tablet 0    pantoprazole (PROTONIX) 40 MG EC tablet Take 1 tablet by mouth Every Morning. 30 tablet 1    rosuvastatin (Crestor) 20 MG tablet Take 2 tablets by mouth Every Night. 30 tablet 11    spironolactone (ALDACTONE) 25 MG tablet TAKE 1 TABLET BY MOUTH DAILY 90 tablet 1    thiamine (VITAMIN B1) 100 MG tablet Take 1 tablet by mouth Daily. 90 tablet 0    vilazodone (Viibryd) 40 MG tablet tablet Take 1 tablet by mouth Daily. 90 tablet 1    vitamin B-12 (CYANOCOBALAMIN) 500 MCG tablet Take 1 tablet by mouth Daily. 90 tablet 0     No current facility-administered medications for this visit.         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    HTN (hypertension)    History of CVA with residual deficit    Alcohol abuse    Hospital discharge follow-up    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    Anxiety about health    Vertebral artery stenosis, left    Dizziness    Memory loss    History of alcohol dependence    Syncope    ALLISON (acute kidney injury)         History of Substance Use:   Patient answered NO to experiencing two or more of the following problems related to substance use: using more than intended or over longer period than intended; difficulty quitting or cutting back use; spending a great deal of time obtaining, using, or recovering from using; craving or strong desire or urge to use;  work and/or school problems; financial problems; family problems; using in dangerous situations; physical or mental health problems; relapse; feelings of guilt or remorse about use; times when used and/or drank alone; needing to use more in order to achieve the desired effect; illness or withdrawal when stopping or cutting back use; using to relieve or avoid getting ill or developing withdrawal symptoms; and black outs and/or memory issues when using.        Substance Age Frequency Amount Method Last use   Nicotine        Alcohol  Regularly before stroke (August 2023)      Marijuana  Randomly before stroke      Benzo        Pain Pills        Cocaine        Meth        Heroin        Suboxone        Synthetics/Other:            SUICIDE RISK ASSESSMENT/CSSRS  1. Does patient have thoughts of suicide? no  2. Does patient have intent for suicide? no  3. Does patient have a current plan for suicide? no  4. History of suicide attempts: no  5. Family history of suicide or attempts: no  6. History of violent behaviors towards others or property or thoughts of committing suicide: no  7. History of sexual aggression toward others: no  8. Access to firearms or weapons: no    PHQ-Score Total:  PHQ-9 Total Score: (P) 16    RIRI-7 Score Total:  Over the last two weeks, how often have you been bothered by the following problems?  Feeling nervous, anxious or on edge: (P) Nearly every day  Not being able  to stop or control worrying: (P) Several days  Worrying too much about different things: (P) Nearly every day  Trouble Relaxing: (P) Nearly every day  Being so restless that it is hard to sit still: (P) Not at all  Becoming easily annoyed or irritable: (P) Several days  Feeling afraid as if something awful might happen: (P) Nearly every day  RIRI 7 Total Score: (P) 14  If you checked any problems, how difficult have these problems made it for you to do your work, take care of things at home, or get along with other people: (P) Extremely difficult        Mental Status Exam:   Hygiene:   good  Cooperation:  Cooperative  Eye Contact:  Fair  Psychomotor Behavior:  Appropriate  Affect:  Appropriate  Mood: normal  Hopelessness: Denies  Speech:  Normal  Thought Process:  Linear  Thought Content:  Normal  Suicidal:  None  Homicidal:  None  Hallucinations:  None  Delusion:  None  Memory:  Deficits  Orientation:  Situation  Reliability:  fair  Insight:  Fair  Judgement:  Fair  Impulse Control:  Fair    Impression/Formulation:    Patient appeared alert and oriented.  Patient is voluntarily requesting to begin outpatient therapy at [facility].  Patient is receptive to assistance with maintaining a stable lifestyle.  Patient presents with history of   Chief Complaint   Patient presents with    Anxiety    Depression      Patient is agreeable to attend routine therapy sessions.  Patient expressed desire to maintain stability and participate in the therapeutic process.        Assessment and Plan: Pt arrived on time to session, he was alert and oriented x3. Pt reflected on health and life stressors faced. Since August pt has lost employment, had a stroke, struggled financially,had issues with family. Pt appears overwhelmed with so many life changes and physical/mental effects of his stroke. Pt identified lack of motivation, staying in bed/couch, memory issues, balance concerns, isolation.  Pt does  have healthy support through lifelong friends and his son. Pt was encouraged to call  Resource number to aid in minimizing stress. Pt appeared receptive to coping skills and encouragements discussed in session. Pt appears open and honest about current and past last stressors effecting mental health. Pt appears motivated to improve mental health as well as overall quality of life. Pt and clinician will identify triggers, implement coping skills and process current/past life stressors.    Visit Diagnoses:    ICD-10-CM ICD-9-CM   1. RIRI (generalized anxiety disorder)  F41.1 300.02   2. Current mild episode of major depressive disorder without prior episode  F32.0 296.21        Functional Status: No impairment    Prognosis: Fair with Ongoing Treatment     Return in about 2 weeks (around 4/3/2024).      Treatment Plan: Continue supportive psychotherapy efforts and medications as indicated. Obtain release of information for current treatment team for continuity of care as needed. Patient will adhere to medication regimen as prescribed and report any side effects. Patient will contact this office, call 911 or present to the nearest emergency room should suicidal or homicidal ideations occur.    Short Term Goals: Patient will be compliant with medication, and patient will have no significant medication related side effects.  Patient will be engaged in psychotherapy as indicated.  Patient will report subjective improvement of symptoms.    Long Term Goals: To stabilize mood and treat/improve subjective symptoms, the patient will stay out of the hospital, the patient will be at an optimal level of functioning, and the patient will take all medications as prescribed.The patient verbalized understanding and agreement with goals that were mutually set.    Crisis Plan:    If symptoms/behaviors persist, patient will present to the nearest hospital for an assessment. Advised patient of [facility] 24/7 assessment services.          This document has been electronically signed by Phylicia Arriola LCSW  March 20, 2024 11:01 EDT     Part of this note may be an electronic transcription/translation of spoken language to printed text using the Dragon Dictation System.

## 2024-03-22 ENCOUNTER — OFFICE VISIT (OUTPATIENT)
Dept: SPORTS MEDICINE | Facility: CLINIC | Age: 59
End: 2024-03-22
Payer: MEDICAID

## 2024-03-22 VITALS
TEMPERATURE: 98.4 F | HEART RATE: 66 BPM | OXYGEN SATURATION: 97 % | WEIGHT: 235 LBS | HEIGHT: 74 IN | SYSTOLIC BLOOD PRESSURE: 110 MMHG | DIASTOLIC BLOOD PRESSURE: 84 MMHG | BODY MASS INDEX: 30.16 KG/M2 | RESPIRATION RATE: 14 BRPM

## 2024-03-22 DIAGNOSIS — I10 ESSENTIAL HYPERTENSION: Primary | ICD-10-CM

## 2024-03-22 DIAGNOSIS — Z09 HOSPITAL DISCHARGE FOLLOW-UP: ICD-10-CM

## 2024-03-22 DIAGNOSIS — Z86.73 HISTORY OF CVA (CEREBROVASCULAR ACCIDENT): ICD-10-CM

## 2024-03-22 DIAGNOSIS — I69.393 ATAXIA DUE TO OLD CEREBELLAR INFARCTION: ICD-10-CM

## 2024-03-22 PROBLEM — N17.9 AKI (ACUTE KIDNEY INJURY): Status: RESOLVED | Noted: 2024-03-15 | Resolved: 2024-03-22

## 2024-03-22 PROBLEM — R42 DIZZINESS: Status: RESOLVED | Noted: 2024-02-26 | Resolved: 2024-03-22

## 2024-03-22 PROBLEM — Z87.19 HISTORY OF ESOPHAGITIS: Status: RESOLVED | Noted: 2022-12-08 | Resolved: 2024-03-22

## 2024-03-22 PROBLEM — R55 SYNCOPE: Status: RESOLVED | Noted: 2024-03-15 | Resolved: 2024-03-22

## 2024-03-22 RX ORDER — DIPHENOXYLATE HYDROCHLORIDE AND ATROPINE SULFATE 2.5; .025 MG/1; MG/1
1 TABLET ORAL DAILY
Qty: 90 TABLET | Refills: 0 | Status: SHIPPED | OUTPATIENT
Start: 2024-03-22

## 2024-03-22 RX ORDER — METOPROLOL SUCCINATE 25 MG/1
25 TABLET, EXTENDED RELEASE ORAL DAILY
Qty: 90 TABLET | Refills: 0 | Status: SHIPPED | OUTPATIENT
Start: 2024-03-22

## 2024-03-22 RX ORDER — SPIRONOLACTONE 25 MG/1
25 TABLET ORAL DAILY
Qty: 90 TABLET | Refills: 0 | Status: SHIPPED | OUTPATIENT
Start: 2024-03-22

## 2024-03-22 RX ORDER — CHOLECALCIFEROL (VITAMIN D3) 125 MCG
5 CAPSULE ORAL NIGHTLY
Qty: 90 TABLET | Refills: 0 | Status: SHIPPED | OUTPATIENT
Start: 2024-03-22

## 2024-03-22 NOTE — PROGRESS NOTES
Flako is a 58 y.o. year old male presents to [facility]    Chief Complaint   Patient presents with    Follow-up     Hospital-fall which he hit the back of his head causing him to black out-about 1 week ago       History of Present Illness  History of Present Illness  The patient presents for evaluation of multiple medical concerns.    His blood pressure was good last night too. He had to add hydralazine twice because his blood pressure was above 161/101. Since then, his blood pressure has been okay. He is worried because his systolic blood pressure has come down, but his diastolic blood pressure stays a little high. Last night, his blood pressure was 121/96. He has been taking amlodipine again. He is taking everything except for the hydralazine. He has 2 blood pressure cuffs and checks his blood pressure against each other. His cardiologist told him to check his blood pressure once a day.    He went down once on Sunday with a dizzy spell. He had another dizzy spell yesterday, but it was mild. He fell down once in the morning to get dressed, which he attributes to rushing and standing on one leg and rushing. He is still weak than last week. His speech was not so good yesterday, but it seems to be good right now. He thinks lying down and getting up is a big contributor to his dizzy spell. He was trained with his feet down when he was in the hospital and did PT for 2 weeks. He thinks he got up okay on Sunday. He had taken his blood pressure medication last night. He did not check his blood pressure when this happened.    Supplemental Information  His mood is better today. He has been sleeping better. He is waiting for apnea test. He needs refills on metoprolol, spironolactone, multivitamin, and melatonin. He has an appointment with his cardiologist on 08/03/2024. He is working on speech therapy. He is driving. 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 He was told he was  "having some atrial fibrillation, but they did not want to put him on blood thinners because of the falling.    I have reviewed the patient's medical, family, and social history in detail and updated the computerized patient record.    /84 (BP Location: Left arm, Patient Position: Sitting, Cuff Size: Adult)   Pulse 66   Temp 98.4 °F (36.9 °C) (Infrared)   Resp 14   Ht 188 cm (74\")   Wt 107 kg (235 lb)   SpO2 97%   BMI 30.17 kg/m²      Physical Exam    Vital signs reviewed.   General: No acute distress.  Eyes: conjunctiva clear; pupils equally round and reactive  ENT: external ears atraumatic  CV: no peripheral edema  Resp: normal respiratory effort, no use of accessory muscles  Skin: no rashes or wounds; normal turgor  Psych: mood and affect appropriate; recent and remote memory intact  Neuro: sensation to light touch intact    MSK Exam  Physical Exam  Clear to auscultation bilaterally.  S1, S2. No murmurs, rubs, or gallops.  Slightly antalgic gait.  Normal speech, congruent.    Common labs          2/14/2024    05:12 3/15/2024    04:23 3/16/2024    06:18   Common Labs   Glucose 105  179  105    BUN 14  18  13    Creatinine 0.92  1.31  1.26    Sodium 139  143  145    Potassium 3.7  3.6  4.7    Chloride 105  110  110    Calcium 9.0  9.1  8.2    Albumin 4.4  4.4     Total Bilirubin 0.6  0.2     Alkaline Phosphatase 72  79     AST (SGOT) 15  14     ALT (SGPT) 21  22     WBC 7.37  7.22     Hemoglobin 12.5  11.4     Hematocrit 38.8  36.5     Platelets 235  295       Results  Hospital records reviewed         Diagnoses and all orders for this visit:    Essential hypertension  -     metoprolol succinate XL (TOPROL-XL) 25 MG 24 hr tablet; Take 1 tablet by mouth Daily.  -     spironolactone (ALDACTONE) 25 MG tablet; Take 1 tablet by mouth Daily.    History of CVA (cerebrovascular accident)  -     melatonin 5 MG tablet tablet; Take 1 tablet by mouth Every Night.  -     multivitamin (One-Daily Multi-Vitamin) tablet " tablet; Take 1 tablet by mouth Daily.    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 Hypertension.  His blood pressure looks good today. He was advised to check his blood pressure every day at 3:00 PM. He was advised not to check his blood pressure if he is not feeling ill or lightheaded.    2. Dizziness.  He was educated on vestibular exercises.    Follow-up  The patient will follow up in 2 months.      I spent 30 minutes caring for Flako on this date of service. This time includes time spent by me in the following activities:preparing for the visit, reviewing tests, obtaining and/or reviewing a separately obtained history, performing a medically appropriate examination and/or evaluation , counseling and educating the patient/family/caregiver, ordering medications, tests, or procedures, documenting information in the medical record, and independently interpreting results and communicating that information with the patient/family/caregiver    Follow Up     Return for Next scheduled follow up.    Patient was given instructions and counseling regarding his condition or for health maintenance advice. Please see specific information pulled into the AVS if appropriate.     Patient or patient representative verbalized consent for the use of Ambient Listening during the visit with  SIMRAN Rodriguez Jr.,  for chart documentation. 3/22/2024  11:09 EDT

## 2024-03-28 ENCOUNTER — TELEPHONE (OUTPATIENT)
Dept: SLEEP MEDICINE | Facility: HOSPITAL | Age: 59
End: 2024-03-28
Payer: MEDICAID

## 2024-03-29 ENCOUNTER — TELEPHONE (OUTPATIENT)
Age: 59
End: 2024-03-29

## 2024-03-29 NOTE — TELEPHONE ENCOUNTER
Thanks Sandy.  Hard to say what that rhythm is.  No changes to treatment plan.  Can you let me know if there is any labeled A-fib events the last more than 1 hour or the burden increases more than 1%?  Thank you

## 2024-03-29 NOTE — TELEPHONE ENCOUNTER
The pt's LOOP recorder report has shown 6 AF labeled events with the longest lasting 16 minutes on 3/6/24. Total AF burden states 0.3%. I have included the strip below of the longest AF event - there are PVCs present and it is hard to determine if this is real AF. Pt is not on AC.   Kindly,   Meg Schotanus Device RN

## 2024-04-01 NOTE — TELEPHONE ENCOUNTER
Yes, I will make note of that and we will notify for AF under those guidelines.   Kindly,   Debbie Vaca

## 2024-04-03 ENCOUNTER — OFFICE VISIT (OUTPATIENT)
Dept: CARDIOLOGY | Facility: CLINIC | Age: 59
End: 2024-04-03
Payer: MEDICAID

## 2024-04-03 VITALS
WEIGHT: 232.5 LBS | HEART RATE: 60 BPM | HEIGHT: 74 IN | SYSTOLIC BLOOD PRESSURE: 118 MMHG | OXYGEN SATURATION: 97 % | DIASTOLIC BLOOD PRESSURE: 80 MMHG | BODY MASS INDEX: 29.84 KG/M2

## 2024-04-03 DIAGNOSIS — E78.41 ELEVATED LIPOPROTEIN(A): ICD-10-CM

## 2024-04-03 DIAGNOSIS — R93.1 ELEVATED CORONARY ARTERY CALCIUM SCORE: ICD-10-CM

## 2024-04-03 DIAGNOSIS — R55 RECURRENT SYNCOPE: ICD-10-CM

## 2024-04-03 DIAGNOSIS — I69.393 ATAXIA DUE TO OLD CEREBELLAR INFARCTION: ICD-10-CM

## 2024-04-03 DIAGNOSIS — I65.02 VERTEBRAL ARTERY STENOSIS, LEFT: ICD-10-CM

## 2024-04-03 DIAGNOSIS — E78.5 DYSLIPIDEMIA, GOAL LDL BELOW 70: ICD-10-CM

## 2024-04-03 DIAGNOSIS — I69.30 HISTORY OF CVA WITH RESIDUAL DEFICIT: Primary | ICD-10-CM

## 2024-04-03 NOTE — PATIENT INSTRUCTIONS
"Thankfully, you do not have a lot of atrial fibrillation on your implanted loop monitor, however that being said, I talked to Dr. Colby and your neurologist who does think that the initial stroke you had might have been due to something called a \"cardioembolic\" source which means it came due to A-fib.  To treat this, I think you should stop the aspirin and start the Eliquis medication.  Like we discussed, this does have a slightly higher bleeding risk but I understand your family history of stroke related complications makes it more reasonable to start this medication.    Furthermore, you do have elevated stroke risk related to high cholesterol and something called lipoprotein a.  You have increased your Crestor medication which helps with that, but the goal bad cholesterol, LDL, needs to be less than 70 in patients who have high risk for future cardiovascular events, either strokes or heart attacks.  To help treat this, I have started a new medication called Repatha which is the injection cholesterol medication that you take 2 times per month.    We will arrange for a tilt table test to see if theres another cardiovascular cause of passing out.   "

## 2024-04-03 NOTE — PROGRESS NOTES
[facility]    Subjective:     Encounter Date:04/03/24      Patient ID: Flako Coreas is a 58 y.o. male.    Chief Complaint:   No chief complaint on file.  Follow-up syncopal episodes  History of Present Illness      Mr. Coreas is a very pleasant 58-year-old gentleman who had a rather complicated 2023.  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  He had numerous syncopal episodes since that time.    He continued have recurrent blackout spells.  He has been evaluated by this by neurology, and is undergoing further evaluation for these as well.    His gait is little bit better, but he continues to have these intermittent blackout spells.  The most recent spell he had occurred while getting out of a hot shower.  There are different programs but the typical prodrome for which she seeks care today is a feeling of abrupt lightheadedness where he feels like he is going to fall forward, accompanied by diaphoresis, nausea.  It was thought that there could be vagal spells contributing.    His blood pressure has been less labile.    He underwent ILR placement for the recurrent loss of consciousness spells and falls.  There have not been any pauses or significant arrhythmias, but there have been some episodes of A-fib.  Most recent irrigation had 0.3% total burden.  Longest was 16 minutes.    He has not had a significant fall where he hit his head in some time.  But he does continue to have some intermittent falling forward spells.     Previous cardiac testing:  MEGHANN August 22, 2023:  Left ventricular systolic function is hyperdynamic (EF > 70%).    Left ventricular wall thickness is consistent with mild to moderate concentric hypertrophy.    Left ventricular diastolic function is consistent with (grade I) impaired relaxation.    Normal right ventricular cavity size and systolic function noted.    The left atrial cavity is mildly dilated.    No evidence of a left atrial  appendage thrombus was present    No evidence of a patent foramen ovale. Saline test results are negative.    There are very mild plaques in the distal descending thoracic aorta. There are no plaques in the aortic arch or ascending aorta    There is no evidence of pericardial effusion    Stress test October 2, 2023:    Equivocal ECG evidence of myocardial ischemia.  This was a submaximal stress test.  73% of age-predicted heart rate achieved.    Negative clinical evidence of myocardial ischemia.     30-day event recorder October 20, 2023:  A normal monitor study.     14-day patch Holter: November 17, 2023:  There were 14 episodes of nonsustained supraventricular tachycardia, longest lasting 12 beats and the fastest with a rate of 197 bpm.  No sustained arrhythmias or pauses.      The following portions of the patient's history were reviewed and updated as appropriate: allergies, current medications, past family history, past medical history, past social history, past surgical history and problem list.    Past Medical History:   Diagnosis Date    ADHD (attention deficit hyperactivity disorder)     On going issue    Anxiety     Lepe's esophagus     Benign prostatic hyperplasia Surgery in 12/2021    Brain concussion 11/2023    Clotting disorder Intestinal    Depression     Diverticulitis     Diverticulosis     Duodenitis     Enteritis     Failure to thrive (child)     Family history of blood clots     Fracture of wrist 1985    Fracture, foot 2019    GERD (gastroesophageal reflux disease)     Hyperlipidemia     Hypertension     Hypertensive emergency     Irritable bowel syndrome 2017    Low testosterone     Microcytosis     Pancreatitis     Pneumonia     PONV (postoperative nausea and vomiting)     Seasonal affective disorder     Shoulder injury     Stroke     Unexplained weight loss     Visual impairment 8/2023       Past Surgical History:   Procedure Laterality Date    CARDIAC ELECTROPHYSIOLOGY PROCEDURE N/A  "01/24/2024    Procedure: Loop insertion- [employer] LINQ;  Surgeon: Kaela Walker MD;  Location: [facility] CATH INVASIVE LOCATION;  Service: Cardiovascular;  Laterality: N/A;    COLON SURGERY      COLONOSCOPY      COLONOSCOPY N/A 12/11/2022    Procedure: COLONOSCOPY INTO CECUM WITH HOT SNARE POLYPECTOMY;  Surgeon: Albert Gallo MD;  Location: [facility] ENDOSCOPY;  Service: Gastroenterology;  Laterality: N/A;  PRE- GI BLEED  POST- DIVERTICULOSIS, POLYP    COLONOSCOPY N/A 02/14/2024    Procedure: COLONOSCOPY into cecum/terminal ileum;  Surgeon: Albert Gallo MD;  Location: [facility] ENDOSCOPY;  Service: Gastroenterology;  Laterality: N/A;  diverticulosis, hemorrhoids    ENDOSCOPY N/A 12/10/2022    Procedure: ESOPHAGOGASTRODUODENOSCOPY;  Surgeon: Albert Gallo MD;  Location: [facility] ENDOSCOPY;  Service: Gastroenterology;  Laterality: N/A;  PRE- DARK STOOLS  POST- BARRETTS ESOPHAGUS    ENDOSCOPY N/A 02/14/2024    Procedure: ESOPHAGOGASTRODUODENOSCOPY with biopsy;  Surgeon: Albert Gallo MD;  Location: [facility] ENDOSCOPY;  Service: Gastroenterology;  Laterality: N/A;  long segment wilson's, hiatal hernia    FRACTURE SURGERY Right 1980    for broken arm    FRACTURE SURGERY Right     for broken hand    HAND SURGERY  1990    INCISION AND DRAINAGE ABSCESS  2015    anal    PROSTATE SURGERY      SHOULDER SURGERY  2022    SMALL INTESTINE SURGERY      TONSILLECTOMY      TRIGGER POINT INJECTION  2017         Procedures       Objective:     Vitals:    04/03/24 0833   BP: 118/80   BP Location: Left arm   Pulse: 60   SpO2: 97%   Weight: 105 kg (232 lb 8 oz)   Height: 188 cm (74\")           Constitutional:       Appearance: Healthy appearance. Not in distress.      Comments: Unstable gait.  Otherwise unremarkable exam.   Neck:      Vascular: JVD normal.   Pulmonary:      Effort: Pulmonary effort is normal.      Breath sounds: Normal breath sounds.   Cardiovascular:      PMI at left midclavicular line. Normal rate. Regular rhythm. " Normal S2.       Murmurs: There is no murmur.   Pulses:     Intact distal pulses.   Edema:     Peripheral edema absent.   Skin:     General: Skin is warm and dry.   Neurological:      General: No focal deficit present.      Mental Status: Alert, oriented to person, place, and time and oriented to person, place and time.   Psychiatric:         Mood and Affect: Mood and affect normal.         Lab Review:     Lipid Panel          8/20/2023    06:12 10/24/2023    06:37 2/12/2024    09:37   Lipid Panel   Total Cholesterol 117  127  167    Triglycerides 115  88  217    HDL Cholesterol 34  39  34    VLDL Cholesterol 21  17  37    LDL Cholesterol  62  71  96    LDL/HDL Ratio 1.76  1.81  2.64      BUN   Date Value Ref Range Status   03/16/2024 13 6 - 20 mg/dL Final   08/27/2022 16 8 - 26 mg/dL Final     Creatinine   Date Value Ref Range Status   03/16/2024 1.26 0.76 - 1.27 mg/dL Final   10/23/2023 1.30 0.60 - 1.30 mg/dL Final     Comment:     Serial Number: [id number]:  [id number]   08/27/2022 0.96 0.73 - 1.18 mg/dL Final   09/28/2018 0.93 0.73 - 1.22 mg/dL Final     Potassium   Date Value Ref Range Status   03/16/2024 4.7 3.5 - 5.2 mmol/L Final   08/27/2022 3.7 3.5 - 5.1 mmol/L Final     ALT (SGPT)   Date Value Ref Range Status   03/15/2024 22 1 - 41 U/L Final   08/27/2022 20 0 - 55 U/L Final     AST (SGOT)   Date Value Ref Range Status   03/15/2024 14 1 - 40 U/L Final   08/27/2022 20 5 - 34 U/L Final     I directly reviewed and interpreted the rhythm strips from his ILR recorder tracings including the irregular narrow complex tachycardia eventMarch 6, 2024    I directly reviewed and interpreted the patient's CT angiogram of his chest obtained onFebruary 8, 2022 which reveals coronary artery calcifications and a multivessel pattern including the LAD, circumflex, and RCA.    Performed        Assessment:          Diagnosis Plan   1. History of CVA with residual deficit  apixaban (ELIQUIS) 5 MG tablet tablet    Evolocumab  (REPATHA) solution auto-injector SureClick injection      2. 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 Vertebral artery stenosis, left  apixaban (ELIQUIS) 5 MG tablet tablet    Evolocumab (REPATHA) solution auto-injector SureClick injection      4. Dyslipidemia, goal LDL below 70  apixaban (ELIQUIS) 5 MG tablet tablet    Evolocumab (REPATHA) solution auto-injector SureClick injection      5. Elevated lipoprotein(a)  apixaban (ELIQUIS) 5 MG tablet tablet    Evolocumab (REPATHA) solution auto-injector SureClick injection      6. Elevated coronary artery calcium score        7. Recurrent syncope  Tilt Table               Plan:         Intermittent syncopal episodes: Unclear if this is related to blood pressure, or orthostatic signs.  I do not believe orthostatics have ever been positive.  Unclear if this is a autonomic/neurologic issue with his blood pressure dropping because he does have some diaphoresis with the spells.   Will arrange for tilt table test  No arrhythmia correlates on his ILR    Hypertension, labile: Better on current therapy.    Undergoing sleep apnea evaluation   Continue current regimen of metoprolol succinate 25 daily, spironolactone 25Lisinopril 40  He underwent a secondary workup while hospitalized with 24-hour catecholamines which were negative  He was noted to have mild KRIS and is now on CPAP.   Possible KRIS.  On CPAP  CVA, suspected embolic: I had a lengthy discussion with his neurologist Dr. Leon.  He said a few different neurologic events, will some could be related to intracerebral atherosclerosis and plaque mediated, but there does appear to be some that, including the initial 1, that were embolic.    He is only had 16 minutes of A-fib on his ILR, but he could have had longer episodes initially.   He is at high risk for recurrent embolic events given his risk factors  I had a lengthy discussion with patient about risks and benefits, he still continues to have some of  these falls, but he is never really injured himself thankfully.  He reports that he had a family member who  from a CVA related to A-fib, and he would much rather begin therapeutic anticoagulation with Eliquis.  I did discuss with patient that the alternative approach this could be to proceed with a tilt table and look for any longer A-fib episodes on his Linq implant before proceeding without anticoagulation but patient would prefer to begin it now given this family history which I think is reasonable.  Discussed with Dr. Leon who also finds this reasonable.   To minimize bleeding risk, we do not feel that aspirin on top of the Eliquis would be beneficial so we will stop aspirin.  I did discuss with patient the possible life-threatening bleeding complications in the setting of falls with therapeutic anticoagulation and with the risk benefits with him but we will proceed with Eliquis monotherapy per above.  Coronary artery calcium, intracerebral atherosclerosis  Hyperlipidemia  Elevated lipoprotein a  His LDL is in the 90s on Crestor, it was increased to 40, but given his elevated lipoprotein, coronary calcium, prior CVA that was probably plaque mediated, LDL greater than 70, will start Repatha  Continue Crestor       RTC 3 months.  Starting Repatha and Eliquis in the interim.  Tilt table test pending.     I spent 50 minutes today with face-to-face evaluation, care coordination discussions with patient's primary neurologist, chart review, and complex medical decision making per above.    Chinmay Taylor MD  [facility]  24  13:26 EST       Current Outpatient Medications:     folic acid (FOLVITE) 1 MG tablet, Take 1 tablet by mouth Daily., Disp: 90 tablet, Rfl: 0    lisinopril (PRINIVIL,ZESTRIL) 40 MG tablet, TAKE 1 TABLET BY MOUTH DAILY, Disp: 90 tablet, Rfl: 1    melatonin 5 MG tablet tablet, Take 1 tablet by mouth Every Night., Disp: 90 tablet, Rfl: 0    metoprolol succinate XL  (TOPROL-XL) 25 MG 24 hr tablet, Take 1 tablet by mouth Daily., Disp: 90 tablet, Rfl: 0    multivitamin (One-Daily Multi-Vitamin) tablet tablet, Take 1 tablet by mouth Daily., Disp: 90 tablet, Rfl: 0    pantoprazole (PROTONIX) 40 MG EC tablet, Take 1 tablet by mouth Every Morning., Disp: 30 tablet, Rfl: 1    rosuvastatin (Crestor) 20 MG tablet, Take 2 tablets by mouth Every Night., Disp: 30 tablet, Rfl: 11    spironolactone (ALDACTONE) 25 MG tablet, Take 1 tablet by mouth Daily., Disp: 90 tablet, Rfl: 0    thiamine (VITAMIN B1) 100 MG tablet, Take 1 tablet by mouth Daily., Disp: 90 tablet, Rfl: 0    vilazodone (Viibryd) 40 MG tablet tablet, Take 1 tablet by mouth Daily., Disp: 90 tablet, Rfl: 1    vitamin B-12 (CYANOCOBALAMIN) 500 MCG tablet, Take 1 tablet by mouth Daily., Disp: 90 tablet, Rfl: 0    apixaban (ELIQUIS) 5 MG tablet tablet, Take 1 tablet by mouth 2 (Two) Times a Day., Disp: 60 tablet, Rfl: 11    Evolocumab (REPATHA) solution auto-injector SureClick injection, Inject 1 mL under the skin into the appropriate area as directed Every 14 (Fourteen) Days., Disp: 2 mL, Rfl: 11         Return in about 3 months (around 7/3/2024).      Part of this note may be an electronic transcription/translation of spoken language to printed text using the Dragon Dictation System.

## 2024-04-04 ENCOUNTER — TELEPHONE (OUTPATIENT)
Dept: SLEEP MEDICINE | Facility: HOSPITAL | Age: 59
End: 2024-04-04
Payer: MEDICAID

## 2024-04-04 NOTE — TELEPHONE ENCOUNTER
Return call to PT left message for pt to call back fir any question . I call [employer] to check on his order [employer] just call him to day and left a message for him to call back to be set up on his cpap and supplies.

## 2024-04-22 ENCOUNTER — TELEPHONE (OUTPATIENT)
Dept: SPORTS MEDICINE | Facility: CLINIC | Age: 59
End: 2024-04-22
Payer: MEDICAID

## 2024-04-22 RX ORDER — CHOLECALCIFEROL (VITAMIN D3) 125 MCG
500 CAPSULE ORAL DAILY
Qty: 90 TABLET | Refills: 0 | Status: SHIPPED | OUTPATIENT
Start: 2024-04-22

## 2024-04-22 NOTE — TELEPHONE ENCOUNTER
Outgoing call to the patient, he stated he is currently taking Amlodepine and is up to date with his cardiologist for this medication. He will discuss with Cardio for management of this since we do not have this on his current med list.   Multi-vitamin sent last month for 90 days, he will let us know if he needs another refill of this.   Rx being sent today for B12 as requested.    Thanks  Priti    
Patient states that his current insurance will end on 4/24/24  Patient is requesting the following prescriptions be filled before this insurance ends.    multivitamin (One-Daily Multi-Vitamin) tablet tablet     amLODIPine (NORVASC) 10 MG tablet       vitamin B-12 (CYANOCOBALAMIN) 500 MCG tablet     Please advise.    
No significant past surgical history

## 2024-06-26 ENCOUNTER — TELEPHONE (OUTPATIENT)
Age: 59
End: 2024-06-26
Payer: MEDICAID

## 2024-06-26 NOTE — TELEPHONE ENCOUNTER
Pt has a loop recorder. After reviewing remote sent this AM, pt had a pause event that occurred last night (6/25/24) @ 21:10.     Event starts off VS 50s and progressively bradys down until there appears to be at least a 3-second pause before ECG is suspended for 3 seconds. When ECG resumes pause continues with some noise. There does appear to be QRS complexes and noise before the ECG stops. Per report, event lasted for 13 seconds.             I called and spoke to the pt, he stated that he fainted and lost consciousness around this time correlating with this pause event. Pt stated when he regained consciousness, he vomited and was very sweaty. He stated he went to lay down afterwards and fell asleep. Pt stated when he woke up this morning he felt okay but is pretty tired.     I informed the pt to take it easy today and if he has another episode that he needs to call 911 (informed him that he should not drive himself since he did lose consciousness with this event) and go to the ED for further evaluation. Pt verbalized understanding.

## 2024-06-26 NOTE — TELEPHONE ENCOUNTER
This episode occurred while he got up and went to the bathroom.  He states he had just started to sit down prior to having a bowel movement and got dizzy lightheaded and passed out.    This episode is similar to what he has felt before in the past.  He gives a multiyear history of having multiple episodes of fainting in the bathroom either when he is getting out of the shower getting ready to go into the shower.  Most of these episodes occur at night when he first gets out of bed and moves around.    I am suspicious that he is having possible vasovagal syncope is quite exaggerated and symptomatic.  Will discuss with Dr. Langford today.     Patient does not currently have insurance

## 2024-06-26 NOTE — TELEPHONE ENCOUNTER
Called pt back, his insurance was medicare and it was cancelled due to him going on federal disability.     He is not currently working due to his health complaints.     He is able to reapply on Friday or Monday when he gets his SS.       Discussed with pt, he does benefit from PPM. He will contact us  on Friday with update in symptoms.

## 2024-06-28 NOTE — TELEPHONE ENCOUNTER
Patient called back with update.    He wanted to let you know that he has been doing ok, has not fainted anymore.    He is hoping he will have insurance on Monday and will let us know.

## 2024-07-01 ENCOUNTER — TELEPHONE (OUTPATIENT)
Age: 59
End: 2024-07-01
Payer: MEDICAID

## 2024-07-01 NOTE — TELEPHONE ENCOUNTER
----- Message from Vilma Hester sent at 7/1/2024  3:08 PM EDT -----  Regarding: FW: Heart pause  Contact: [phone]  DUNIA Juárez  ----- Message -----  From: Miriam Loya MA  Sent: 7/1/2024   2:47 PM EDT  To: JANEEN Marion; JANEEN Mcgrath  Subject: FW: Heart pause                                        ----- Message -----  From: Flako Coreas  Sent: 7/1/2024   2:43 PM EDT  To: [facility]  Subject: Heart pause                                      That  is not correct.  When my heart  stopped I did not feel dizzy at all.  I had no warning at all.  I woke up on the floor throwing  up and sweating profusely.  I laid there for 30  minutes to an hour and crawled to my bed and woke up  the next morning.    Im extremely conerned.  Can you explain to me how my heart stopping for 13 seconds is okay?    i have had 10 strokes this year.  Please help me diagnose the problem. I don’t want to die yet.

## 2024-07-01 NOTE — TELEPHONE ENCOUNTER
I have reviewed with Dr. Langford    We just need to see him in the office    I called and talked with him and he is okay with that plan    If he has any symptoms between now and then he will go to the ED.     He is going to refrain from driving  until we see him in the office at 10:40 am on Wednesday    Radha/Imelda/Danni---can you add him to my schedule and Anastasiya will see him with me

## 2024-07-01 NOTE — TELEPHONE ENCOUNTER
Reviewed with Dr. Langford    Let him know that since he has not had any recurrent episodes that he can update us in August when he gets insurance and we can further discuss at that time    If he had recurrent episodes then he should go to the ED per prior discussion.    I think he has a sort of precursor to the episodes--getting dizzy, etc--- he should immediately lay down when he feels this way that can sometimes abort progression of the episodes

## 2024-07-03 ENCOUNTER — OFFICE VISIT (OUTPATIENT)
Age: 59
End: 2024-07-03

## 2024-07-03 VITALS
HEIGHT: 74 IN | BODY MASS INDEX: 29 KG/M2 | SYSTOLIC BLOOD PRESSURE: 134 MMHG | WEIGHT: 226 LBS | DIASTOLIC BLOOD PRESSURE: 78 MMHG | HEART RATE: 98 BPM

## 2024-07-03 DIAGNOSIS — I69.30 HISTORY OF CVA WITH RESIDUAL DEFICIT: ICD-10-CM

## 2024-07-03 DIAGNOSIS — I48.0 PAF (PAROXYSMAL ATRIAL FIBRILLATION): ICD-10-CM

## 2024-07-03 DIAGNOSIS — I10 PRIMARY HYPERTENSION: ICD-10-CM

## 2024-07-03 DIAGNOSIS — R55 VASOVAGAL SYNCOPE: Primary | ICD-10-CM

## 2024-07-03 RX ORDER — AMLODIPINE BESYLATE 10 MG/1
10 TABLET ORAL DAILY
COMMUNITY
Start: 2024-06-21

## 2024-07-03 NOTE — PROGRESS NOTES
Date of Office Visit: 2024  Encounter Provider: JANEEN Marion  Place of Service: [facility] CARDIOLOGY  Patient Name: Flako Coreas  :1965    Chief Complaint   Patient presents with    Syncope   :     HPI: Flako Coreas is a 59 y.o. male who has a complex past medical history---he reports longstanding HTN since age 19, diverticulitis w/bleeding, s/p resection by Dr. Noriega, Lepe's Esophagues.     Renal imaging --no renal artery stenosis.     Initially seen by cardiology  2023 when hospitalized with hypertensive emergency and acute left cerebellar strokes---he has had multiple admissions since that time to [facility]. He had a MEGHANN in 2023---EF >70%, mild to mod concentric hypertrophy, grade I DD, no VALENCIA thrombus, no evidence of PFO, mild plaques distal descending thoracic aorta.     He had had multiple episodes of dizziness, near syncope and syncope over the last several months---loop recorder placed 2024.     Loop has shown low burden AF --few episodes in March 0.3%--longest episode 16 minutes, he was started on apixaban at some point.     May 18th--he had 1 episode 24 minutes in duration.     2024--[place] overnight---went because he just felt confused, not right, MRI of brain showed no new infarcts or acute findings. He reports loop checked and no new findings.     he had syncopal episode--he had gone into the bathroom, sat down to have a bowel movement, passed some gas and passed out---he woke up and was hot, sweaty with N/V. Loop recorder showed a 13 second pause at that time--his HR did start to slow briefly before the pause.     Had him come in today to discuss pause and episode, evaluate for possible intervention.     He currently has no insurance, it will hopefull kick in 2024---cost of medication has been /currently a issue.     He also has not been able to follow up with neurology as outpatient---he has only been seen  during hospitalization due to either insurance, cancelled appts due to hospitalization.     He is not currently on apixaban--ran out, too expensive.     He does monitor his b/p---I reviewed his data----highest recently 180's/100-110 but overall has not been terrible.     He typically takes dose of HCTZ when it stays elevated in the 180's but he says recent [place] admission told him not to do that but unclear as to why.     It is good today.     Past Medical History:   Diagnosis Date    ADHD (attention deficit hyperactivity disorder)     On going issue    Anxiety     Lepe's esophagus     Benign prostatic hyperplasia Surgery in 12/2021    Brain concussion 11/2023    Clotting disorder Intestinal    Depression     Diverticulitis     Diverticulosis     Duodenitis     Enteritis     Failure to thrive (child)     Family history of blood clots     Fracture of wrist 1985    Fracture, foot 2019    GERD (gastroesophageal reflux disease)     Hyperlipidemia     Hypertension     Hypertensive emergency     Irritable bowel syndrome 2017    Low testosterone     Microcytosis     Pancreatitis     Pneumonia     PONV (postoperative nausea and vomiting)     Seasonal affective disorder     Shoulder injury     Stroke     Unexplained weight loss     Visual impairment 8/2023       Past Surgical History:   Procedure Laterality Date    CARDIAC ELECTROPHYSIOLOGY PROCEDURE N/A 01/24/2024    Procedure: Loop insertion- [employer] LINQ;  Surgeon: Kaela Walker MD;  Location: [facility] CATH INVASIVE LOCATION;  Service: Cardiovascular;  Laterality: N/A;    COLON SURGERY      COLONOSCOPY      COLONOSCOPY N/A 12/11/2022    Procedure: COLONOSCOPY INTO CECUM WITH HOT SNARE POLYPECTOMY;  Surgeon: Albert Gallo MD;  Location: [facility] ENDOSCOPY;  Service: Gastroenterology;  Laterality: N/A;  PRE- GI BLEED  POST- DIVERTICULOSIS, POLYP    COLONOSCOPY N/A 02/14/2024    Procedure: COLONOSCOPY into cecum/terminal ileum;  Surgeon: Albert Gallo MD;  Location:   [facility];  Service: Gastroenterology;  Laterality: N/A;  diverticulosis, hemorrhoids    ENDOSCOPY N/A 12/10/2022    Procedure: ESOPHAGOGASTRODUODENOSCOPY;  Surgeon: Albert Gallo MD;  Location: [facility] ENDOSCOPY;  Service: Gastroenterology;  Laterality: N/A;  PRE- DARK STOOLS  POST- BARRETTS ESOPHAGUS    ENDOSCOPY N/A 02/14/2024    Procedure: ESOPHAGOGASTRODUODENOSCOPY with biopsy;  Surgeon: Albert Gallo MD;  Location: [facility] ENDOSCOPY;  Service: Gastroenterology;  Laterality: N/A;  long segment wilson's, hiatal hernia    FRACTURE SURGERY Right 1980    for broken arm    FRACTURE SURGERY Right     for broken hand    HAND SURGERY  1990    INCISION AND DRAINAGE ABSCESS  2015    anal    PROSTATE SURGERY      SHOULDER SURGERY  2022    SMALL INTESTINE SURGERY      TONSILLECTOMY      TRIGGER POINT INJECTION  2017       Social History     Socioeconomic History    Marital status: Single   Tobacco Use    Smoking status: Never    Smokeless tobacco: Never   Vaping Use    Vaping status: Never Used   Substance and Sexual Activity    Alcohol use: Not Currently     Comment: 12 beers on the weekend    Drug use: Not Currently     Frequency: 1.0 times per week     Types: Marijuana     Comment: patient states he has been smoking marijuania daily x 2 weeks    Sexual activity: Not Currently     Partners: Female     Birth control/protection: Other, Birth control pill       Family History   Problem Relation Age of Onset    Stroke Mother     Heart disease Mother     Stroke Father     Hyperlipidemia Father     Hypertension Father        Review of Systems   Constitutional: Negative for chills, fever and malaise/fatigue.   Cardiovascular:  Negative for chest pain, dyspnea on exertion, leg swelling, near-syncope, orthopnea, palpitations, paroxysmal nocturnal dyspnea and syncope.   Respiratory:  Negative for cough and shortness of breath.    Musculoskeletal:  Negative for joint pain, joint swelling and myalgias.  "  Gastrointestinal:  Negative for abdominal pain, diarrhea, melena, nausea and vomiting.   Genitourinary:  Negative for frequency and hematuria.   Neurological:  Negative for light-headedness, numbness, paresthesias and seizures.   Allergic/Immunologic: Negative.    All other systems reviewed and are negative.      No Known Allergies      Current Outpatient Medications:     amLODIPine (NORVASC) 10 MG tablet, Take 1 tablet by mouth Daily., Disp: , Rfl:     ASPIRIN 81 PO, Take  by mouth., Disp: , Rfl:     lisinopril (PRINIVIL,ZESTRIL) 40 MG tablet, TAKE 1 TABLET BY MOUTH DAILY, Disp: 90 tablet, Rfl: 1    spironolactone (ALDACTONE) 25 MG tablet, Take 1 tablet by mouth Daily., Disp: 90 tablet, Rfl: 0    vilazodone (Viibryd) 40 MG tablet tablet, Take 1 tablet by mouth Daily., Disp: 90 tablet, Rfl: 1    apixaban (ELIQUIS) 5 MG tablet tablet, Take 1 tablet by mouth 2 (Two) Times a Day. (Patient not taking: Reported on 7/3/2024), Disp: 60 tablet, Rfl: 11    thiamine (VITAMIN B1) 100 MG tablet, Take 1 tablet by mouth Daily. (Patient not taking: Reported on 7/3/2024), Disp: 90 tablet, Rfl: 0    vitamin B-12 (CYANOCOBALAMIN) 500 MCG tablet, Take 1 tablet by mouth Daily. (Patient not taking: Reported on 7/3/2024), Disp: 90 tablet, Rfl: 0      Objective:     Vitals:    07/03/24 1019   BP: 134/78   Pulse: 98   Weight: 103 kg (226 lb)   Height: 188 cm (74\")     Body mass index is 29.02 kg/m².    PHYSICAL EXAM:    Vitals Reviewed.   General Appearance: No acute distress, well developed and well nourished.   HENT: Atraumatic, normocephalic. External eyes, ears, and nose normal.   Respiratory: No signs of respiratory distress. Respiration rhythm and depth normal.   Clear to auscultation. No rales, crackles, rhonchi, or wheezing auscultated.   Cardiovascular:  Heart Rate and Rhythm: Normal, Heart Sounds: Normal S1 and S2.   Murmurs: No murmurs noted. No rubs, thrills, or gallops.   Lower Extremities: No edema noted.  Musculoskeletal: " Normal movement of extremities  Skin: Warm and dry.   Psychiatric: Patient alert and oriented to person, place, and time. Speech and behavior appropriate. Normal mood and affect.       ECG 12 Lead    Date/Time: 7/3/2024 10:22 AM  Performed by: Marquita Cary APRN    Authorized by: Marquita Cary APRN  Comparison: compared with previous ECG   Similar to previous ECG  Rhythm: sinus rhythm  BPM: 98            Assessment:       Diagnosis Plan   1. Vasovagal syncope  ECG 12 Lead      2. PAF (paroxysmal atrial fibrillation)        3. Primary hypertension        4. History of CVA with residual deficit               Plan:       Dr. Langford and I saw him today.     99% sure that the episode on 6/25 was vasovagal---in talking with him he had one in college and was told he had vasovagal. No indication for pacemaker at this time. Reassured him, continue to monitor loop recordings. Also discussed how to abort episodes/recognize prodrome.     PAF---very low burden and none since 5/18---longest episode 24 minutes, burden 0.3% or less in recent months. OAC too expensive but given the really low burden we do not think he needs to be on it at this point and can continue to monitor.     HTN---longstanding history and difficult to manage---sometimes due to being out of medications ---discussed with Dr. Taylor---will get him follow up with him in August and for now continue current regimen and he can continue to take the HCTZ when it is persistently elevated as this has worked before. If continued difficulty with controlling it can consider referral to nephrology.     Hx of CVA---he has not been able to see neurology as outpatient, will see if we can help get him a follow up with them in the near future.     As always, it has been a pleasure to participate in your patient's care.      Sincerely,         JANEEN Champagne

## 2024-08-20 ENCOUNTER — TELEPHONE (OUTPATIENT)
Age: 59
End: 2024-08-20

## 2024-08-20 NOTE — TELEPHONE ENCOUNTER
I think we need neuro input. This is a gray area for oral AC -- he's had cerebellar strokes but also has severe HTN and could be related to that. I might lean toward oral AC. But tough call.

## 2024-08-20 NOTE — TELEPHONE ENCOUNTER
Patient with linq implanted for syncope with known PAF not on OAC. Remote transmission reviewed today documented 7 new AF events all on 8/17/24. Strips show AF with total time in AF 3.4 hours, AF burden documented @ 0.8 %. Will continue to monitor.   Summary of AF events:        AF labeled event on 8/17 1 hour long:

## 2024-08-21 NOTE — TELEPHONE ENCOUNTER
Called patient.  He was asymptomatic did not feel any of this episodes or events.  He is taking Eliquis right now 5 mg twice daily.  He has not missed any doses.

## 2024-08-26 ENCOUNTER — PATIENT MESSAGE (OUTPATIENT)
Age: 59
End: 2024-08-26

## 2024-08-26 NOTE — TELEPHONE ENCOUNTER
From: Flako Coreas  To: Wayne Crystal  Sent: 8/26/2024 9:52 AM EDT  Subject: Fainted    Hello, I fainted on Saturday morning August 24 and didn’t know if it was another, heart pause. It could’ve been me standing too fast and passing out which ihas happened before. If possible, can somebody look at my loop recorder and just check. Thank you

## 2024-08-26 NOTE — TELEPHONE ENCOUNTER
Called patient.  Let him know that there was no cause for his fainting.  He is feeling good today and did not have any questions

## 2024-09-25 ENCOUNTER — TELEPHONE (OUTPATIENT)
Dept: CARDIOLOGY | Facility: CLINIC | Age: 59
End: 2024-09-25

## 2024-09-25 ENCOUNTER — TELEPHONE (OUTPATIENT)
Dept: NEUROLOGY | Facility: CLINIC | Age: 59
End: 2024-09-25

## 2024-10-02 ENCOUNTER — PATIENT MESSAGE (OUTPATIENT)
Age: 59
End: 2024-10-02

## 2024-10-03 NOTE — TELEPHONE ENCOUNTER
Called and talked to patient, episode yesterday occurred suddenly. He was getting up and walking then all the sudden became faint with pouring sweat. He then fell and hit his head. He did not loose consciousness or loss of bladder and bowel. Symptoms occurred to quickly for him to lay down.    Episode was associated with diaphoresis. Today he feels shaky.     He dos not check BP the past week. Will d/c spirolactone and have him take blood pressures. Encourage him to move positions slowly and wear compression stockings. Will talk to JM to see if he has any other recommendations.     No new episodes were seen on loop

## 2024-10-03 NOTE — TELEPHONE ENCOUNTER
From: Flako Coreas  To: Marquita Cary  Sent: 10/2/2024 5:46 PM EDT  Subject: Faint    I did get very dizzy and fell about 45 minites ago. Hit my forehead on the stairs but was not totallly out. Is there anything on my loop recorder? That is twice this has happened in the last month. I sweat Profusely afterwards both times. It’s the same.. i do not feel any palpitations in my chest. I don’t understand

## 2024-10-29 ENCOUNTER — TELEPHONE (OUTPATIENT)
Dept: CARDIOLOGY | Facility: CLINIC | Age: 59
End: 2024-10-29

## 2024-10-30 NOTE — TELEPHONE ENCOUNTER
"Pt called in to triage.  Pt states that he just had an episode where he \"almost passed out\", but that he sat down before he actually passed out.  He experienced dizzyness, confusion, shakiness, diaphoresis and nausea.  Pt continues to feel shaky, diaphoretic and nausea.  Denies any CP/SOA/palpitations or other related symptoms.  HR/BP currently 183/135, 79 (checked while on the phone).  Pt states that he no longer takes amlodipine or eliquis (off times about 2 months) secondary to insurance issues, but he did take his lisinopril 40mg this morning.      I encouraged pt to call 911 and be evaluated in ER with continuing symptoms, but pt refused.  He did request to speak with rhythm management department to interrogate loop recorder, and call was picked up by Rafaela after I updated her on call.  I also offered to place samples of eliquis for pt to  at registration desk at a later date, if he is to continue eliquis at this time.    Recommendations?    Thanks so much,  Mandy Gan, RN  Triage RN  10/29/24 11:21 EDT     "
Pt has [employer] Linq 2 loop recorder, these devices update daily and do not allow manual interrogations.     I explained this to pt and encouraged him to visit the ER d/t current symptoms and offered office visit to interrogate his loop as well. Pt still declined to visit ER.     Last update is 10/29/24 at midnight, no events.     Pt device will update tonight and I will share any events.   
Pt notified that loop recorder showed no events for yesterday.  I did recommend that if symptoms return and increase in severity, that he seek immediate care in ER.  Pt verbalized understanding.    Mandy Gan RN  Triage Nurse  10/30/24 08:38 EDT    
Update from 10/30/24 loop report    No events    Presenting rhythm VS/SR 70s      
distal

## 2024-12-01 PROCEDURE — 93298 REM INTERROG DEV EVAL SCRMS: CPT | Performed by: INTERNAL MEDICINE

## 2024-12-09 ENCOUNTER — PATIENT MESSAGE (OUTPATIENT)
Age: 59
End: 2024-12-09

## 2024-12-09 NOTE — TELEPHONE ENCOUNTER
Thank you. I tried to call him and requested a callback. His device connected yesterday but it was earlier in the day and there were no events at that time, but it seems like that was prior to his symtpoms. I will see if he calls back to discuss.  Kindly,   Debbie Vaca

## 2024-12-17 ENCOUNTER — HOSPITAL ENCOUNTER (EMERGENCY)
Facility: HOSPITAL | Age: 59
Discharge: HOME | End: 2024-12-17
Attending: EMERGENCY MEDICINE | Admitting: EMERGENCY MEDICINE
Payer: MEDICAID

## 2024-12-17 ENCOUNTER — APPOINTMENT (OUTPATIENT)
Dept: CT IMAGING | Facility: HOSPITAL | Age: 59
End: 2024-12-17
Payer: MEDICAID

## 2024-12-17 ENCOUNTER — APPOINTMENT (OUTPATIENT)
Dept: GENERAL RADIOLOGY | Facility: HOSPITAL | Age: 59
End: 2024-12-17
Payer: MEDICAID

## 2024-12-17 VITALS
DIASTOLIC BLOOD PRESSURE: 86 MMHG | HEART RATE: 55 BPM | WEIGHT: 225 LBS | SYSTOLIC BLOOD PRESSURE: 139 MMHG | HEIGHT: 74 IN | TEMPERATURE: 97.1 F | BODY MASS INDEX: 28.88 KG/M2 | RESPIRATION RATE: 26 BRPM | OXYGEN SATURATION: 97 %

## 2024-12-17 DIAGNOSIS — R06.00 DYSPNEA, UNSPECIFIED TYPE: Primary | ICD-10-CM

## 2024-12-17 DIAGNOSIS — R06.4 HYPERVENTILATION: ICD-10-CM

## 2024-12-17 DIAGNOSIS — I10 BENIGN ESSENTIAL HYPERTENSION: ICD-10-CM

## 2024-12-17 DIAGNOSIS — Z91.199 H/O NONCOMPLIANCE WITH MEDICAL TREATMENT, PRESENTING HAZARDS TO HEALTH: ICD-10-CM

## 2024-12-17 LAB
ALBUMIN SERPL-MCNC: 4.5 G/DL (ref 3.5–5.2)
ALBUMIN/GLOB SERPL: 1.6 G/DL
ALP SERPL-CCNC: 73 U/L (ref 39–117)
ALT SERPL W P-5'-P-CCNC: 26 U/L (ref 1–41)
ANION GAP SERPL CALCULATED.3IONS-SCNC: 15 MMOL/L (ref 5–15)
ARTERIAL PATENCY WRIST A: POSITIVE
AST SERPL-CCNC: 20 U/L (ref 1–40)
ATMOSPHERIC PRESS: 755.9 MMHG
BASE EXCESS BLDA CALC-SCNC: 0.5 MMOL/L (ref 0–2)
BASOPHILS # BLD AUTO: 0.03 10*3/MM3 (ref 0–0.2)
BASOPHILS NFR BLD AUTO: 0.3 % (ref 0–1.5)
BDY SITE: ABNORMAL
BILIRUB SERPL-MCNC: 0.5 MG/DL (ref 0–1.2)
BUN SERPL-MCNC: 14 MG/DL (ref 6–20)
BUN/CREAT SERPL: 13.9 (ref 7–25)
CALCIUM SPEC-SCNC: 9.4 MG/DL (ref 8.6–10.5)
CHLORIDE SERPL-SCNC: 103 MMOL/L (ref 98–107)
CO2 BLDA-SCNC: 18.8 MMOL/L (ref 23–27)
CO2 SERPL-SCNC: 22 MMOL/L (ref 22–29)
CREAT SERPL-MCNC: 1.01 MG/DL (ref 0.76–1.27)
D DIMER PPP FEU-MCNC: 0.58 MCGFEU/ML (ref 0–0.57)
DEPRECATED RDW RBC AUTO: 43.9 FL (ref 37–54)
EGFRCR SERPLBLD CKD-EPI 2021: 86.7 ML/MIN/1.73
EOSINOPHIL # BLD AUTO: 0.67 10*3/MM3 (ref 0–0.4)
EOSINOPHIL NFR BLD AUTO: 7.5 % (ref 0.3–6.2)
ERYTHROCYTE [DISTWIDTH] IN BLOOD BY AUTOMATED COUNT: 14.7 % (ref 12.3–15.4)
GEN 5 1HR TROPONIN T REFLEX: <6 NG/L
GLOBULIN UR ELPH-MCNC: 2.8 GM/DL
GLUCOSE SERPL-MCNC: 93 MG/DL (ref 65–99)
HCO3 BLDA-SCNC: 18.3 MMOL/L (ref 22–28)
HCT VFR BLD AUTO: 48.4 % (ref 37.5–51)
HEMODILUTION: NO
HGB BLD-MCNC: 16.3 G/DL (ref 13–17.7)
HOLD SPECIMEN: NORMAL
HOLD SPECIMEN: NORMAL
IMM GRANULOCYTES # BLD AUTO: 0.01 10*3/MM3 (ref 0–0.05)
IMM GRANULOCYTES NFR BLD AUTO: 0.1 % (ref 0–0.5)
INR PPP: 0.96 (ref 0.9–1.1)
LYMPHOCYTES # BLD AUTO: 3.31 10*3/MM3 (ref 0.7–3.1)
LYMPHOCYTES NFR BLD AUTO: 36.9 % (ref 19.6–45.3)
Lab: ABNORMAL
MAGNESIUM SERPL-MCNC: 2 MG/DL (ref 1.6–2.6)
MCH RBC QN AUTO: 27.8 PG (ref 26.6–33)
MCHC RBC AUTO-ENTMCNC: 33.7 G/DL (ref 31.5–35.7)
MCV RBC AUTO: 82.5 FL (ref 79–97)
MODALITY: ABNORMAL
MONOCYTES # BLD AUTO: 0.8 10*3/MM3 (ref 0.1–0.9)
MONOCYTES NFR BLD AUTO: 8.9 % (ref 5–12)
NEUTROPHILS NFR BLD AUTO: 4.16 10*3/MM3 (ref 1.7–7)
NEUTROPHILS NFR BLD AUTO: 46.3 % (ref 42.7–76)
NOTIFIED WHO: ABNORMAL
NRBC BLD AUTO-RTO: 0 /100 WBC (ref 0–0.2)
NT-PROBNP SERPL-MCNC: 148 PG/ML (ref 0–900)
PCO2 BLDA: 17.5 MM HG (ref 35–45)
PH BLDA: 7.63 PH UNITS (ref 7.35–7.45)
PLATELET # BLD AUTO: 286 10*3/MM3 (ref 140–450)
PMV BLD AUTO: 9.8 FL (ref 6–12)
PO2 BLDA: 91.5 MM HG (ref 80–100)
POTASSIUM SERPL-SCNC: 3.3 MMOL/L (ref 3.5–5.2)
PROT SERPL-MCNC: 7.3 G/DL (ref 6–8.5)
PROTHROMBIN TIME: 13 SECONDS (ref 11.7–14.2)
QT INTERVAL: 433 MS
QTC INTERVAL: 474 MS
RBC # BLD AUTO: 5.87 10*6/MM3 (ref 4.14–5.8)
READ BACK: YES
SAO2 % BLDCOA: 98.7 % (ref 92–98.5)
SODIUM SERPL-SCNC: 140 MMOL/L (ref 136–145)
TOTAL RATE: 24 BREATHS/MINUTE
TROPONIN T NUMERIC DELTA: NORMAL
TROPONIN T SERPL HS-MCNC: <6 NG/L
WBC NRBC COR # BLD AUTO: 8.98 10*3/MM3 (ref 3.4–10.8)
WHOLE BLOOD HOLD COAG: NORMAL
WHOLE BLOOD HOLD SPECIMEN: NORMAL

## 2024-12-17 PROCEDURE — 85025 COMPLETE CBC W/AUTO DIFF WBC: CPT | Performed by: EMERGENCY MEDICINE

## 2024-12-17 PROCEDURE — 36415 COLL VENOUS BLD VENIPUNCTURE: CPT

## 2024-12-17 PROCEDURE — 71275 CT ANGIOGRAPHY CHEST: CPT

## 2024-12-17 PROCEDURE — 85610 PROTHROMBIN TIME: CPT | Performed by: EMERGENCY MEDICINE

## 2024-12-17 PROCEDURE — 96374 THER/PROPH/DIAG INJ IV PUSH: CPT

## 2024-12-17 PROCEDURE — 25010000002 LORAZEPAM PER 2 MG: Performed by: EMERGENCY MEDICINE

## 2024-12-17 PROCEDURE — 84484 ASSAY OF TROPONIN QUANT: CPT | Performed by: EMERGENCY MEDICINE

## 2024-12-17 PROCEDURE — 93005 ELECTROCARDIOGRAM TRACING: CPT | Performed by: EMERGENCY MEDICINE

## 2024-12-17 PROCEDURE — 80053 COMPREHEN METABOLIC PANEL: CPT | Performed by: EMERGENCY MEDICINE

## 2024-12-17 PROCEDURE — 71045 X-RAY EXAM CHEST 1 VIEW: CPT

## 2024-12-17 PROCEDURE — 36600 WITHDRAWAL OF ARTERIAL BLOOD: CPT

## 2024-12-17 PROCEDURE — 93005 ELECTROCARDIOGRAM TRACING: CPT

## 2024-12-17 PROCEDURE — 85379 FIBRIN DEGRADATION QUANT: CPT | Performed by: EMERGENCY MEDICINE

## 2024-12-17 PROCEDURE — 25510000001 IOPAMIDOL PER 1 ML: Performed by: EMERGENCY MEDICINE

## 2024-12-17 PROCEDURE — 83880 ASSAY OF NATRIURETIC PEPTIDE: CPT | Performed by: EMERGENCY MEDICINE

## 2024-12-17 PROCEDURE — 83735 ASSAY OF MAGNESIUM: CPT | Performed by: EMERGENCY MEDICINE

## 2024-12-17 PROCEDURE — 82803 BLOOD GASES ANY COMBINATION: CPT

## 2024-12-17 PROCEDURE — 99285 EMERGENCY DEPT VISIT HI MDM: CPT

## 2024-12-17 RX ORDER — SODIUM CHLORIDE 0.9 % (FLUSH) 0.9 %
10 SYRINGE (ML) INJECTION AS NEEDED
Status: DISCONTINUED | OUTPATIENT
Start: 2024-12-17 | End: 2024-12-18 | Stop reason: HOSPADM

## 2024-12-17 RX ORDER — SODIUM CHLORIDE 0.9 % (FLUSH) 0.9 %
10 SYRINGE (ML) INJECTION AS NEEDED
Status: DISCONTINUED | OUTPATIENT
Start: 2024-12-17 | End: 2024-12-17

## 2024-12-17 RX ORDER — IOPAMIDOL 755 MG/ML
95 INJECTION, SOLUTION INTRAVASCULAR
Status: COMPLETED | OUTPATIENT
Start: 2024-12-17 | End: 2024-12-17

## 2024-12-17 RX ORDER — AMLODIPINE BESYLATE 10 MG/1
10 TABLET ORAL DAILY
Qty: 30 TABLET | Refills: 0 | Status: SHIPPED | OUTPATIENT
Start: 2024-12-17 | End: 2025-01-16

## 2024-12-17 RX ORDER — LISINOPRIL 40 MG/1
40 TABLET ORAL DAILY
Qty: 30 TABLET | Refills: 0 | Status: SHIPPED | OUTPATIENT
Start: 2024-12-17 | End: 2025-01-16

## 2024-12-17 RX ORDER — LORAZEPAM 2 MG/ML
1 INJECTION INTRAMUSCULAR ONCE
Status: COMPLETED | OUTPATIENT
Start: 2024-12-17 | End: 2024-12-17

## 2024-12-17 RX ADMIN — IOPAMIDOL 95 ML: 755 INJECTION, SOLUTION INTRAVENOUS at 20:52

## 2024-12-17 RX ADMIN — LORAZEPAM 1 MG: 2 INJECTION INTRAMUSCULAR; INTRAVENOUS at 18:20

## 2024-12-17 NOTE — ED PROVIDER NOTES
EMERGENCY DEPARTMENT ENCOUNTER    Room Number:  22/22  Date of encounter:  12/19/2024  PCP: Akash Rodriguez Jr., DO  Historian: Patient and friend  Relevant information and history provided by sources other than the patient will be included below and in the ED Course.  Review of pertinent past medical records may also be included in record below and ED Course.    HPI:  Chief Complaint: Shortness of breath  A complete HPI/ROS/PMH/PSH/SH/FH are unobtainable due to: Not applicable  Context: Flako Coreas is a 59 y.o. male who presents to the ED c/o this patient has been suffering from shortness of breath off and on for the past week.  It sometimes he will be very short of breath other times he will not be short of breath.  It has been worsening the past 2 days.  But there are still been fluctuations.  He did walk 5 miles today and he felt that it was worse after that.  At the same time he does suffer from severe anxiety and panic.  He has had a history of panic attacks before.  He reports no chest pain.  He has had a history of strokes and history of hypertension and atrial fibrillation.  He currently is not taking his blood pressure medicine or his Eliquis.  He he does drink alcohol usually about a 12 pack a week.  He will also have marijuana occasionally.  Denies any new swelling to his arms or legs.  Denies vomiting or diarrhea.  From his previous strokes he has some unsteadiness which is at baseline.  Some chronic vision impairment which is at baseline.  He has no new weakness numbness or tingling.  He denies any chest pain or abdominal pain or pain anywhere.        Previous Episodes: History of anxiety and panic attacks before.  He is concerned that something else is going on.  Current Symptoms: See above    MEDICAL HISTORY REVIEWED  I can see this patient was seen at [facility] in June 2024 I can see diagnosed with TIA.  Patient also had uncontrolled hypertension.    I reviewed the note from Karla Cary  who is the cardiologist with Dr. Langford from 7/3/2024.  This gentleman has a longstanding history of hypertension he has been hospitalized with hypertensive emergencies and has had acute left cerebellar strokes in the past.  Has had multiple admissions.  He had a trans esophageal echo in August 2023 ejection fraction was 70% with mild to moderate concentric hypertrophy mentions multiple episodes of dizziness and syncope.  He did have a loop recorder that was placed January 2024 did show low burden of atrial fibrillation he was started on apixaban at some point but appears if he is had problems with insurance and has not followed up with neurology recently.  And then he stopped taking apixaban because he ran out and it was too expensive to fill again.      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 10/23/2023    HTN (hypertension) 10/24/2023    History of CVA with residual deficit 10/25/2023    Alcohol abuse 10/28/2023    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    Anxiety about health 02/26/2024    Vertebral artery stenosis, left 02/26/2024    Memory loss 02/26/2024    History of alcohol dependence 02/26/2024    Vasovagal syncope 03/15/2024    Elevated lipoprotein(a) 04/03/2024    Elevated coronary artery calcium score 04/03/2024    PAF (paroxysmal atrial fibrillation) 07/03/2024     Resolved Ambulatory Problems     Diagnosis Date Noted    Essential hypertension 06/26/2017    Abdominal pain 06/20/2018    Generalized abdominal pain 12/08/2022    History of esophagitis  12/08/2022    Gastrointestinal hemorrhage 12/08/2022    Acute CVA (cerebrovascular accident) 08/17/2023    Hypertensive emergency 08/19/2023    Ataxia 08/19/2023    CVA (cerebral vascular accident) 08/22/2023    Acute CVA (cerebrovascular accident) 10/23/2023    TIA (transient ischemic attack) 10/26/2023    Concussion 10/27/2023    Fall 10/27/2023    Head injury, closed, with concussion 10/27/2023    CHI (closed head injury), initial encounter 12/10/2023    Recurrent syncope 01/19/2024    Closed head injury 02/12/2024    Hospital discharge follow-up 02/26/2024    Dizziness 02/26/2024    ALLISON (acute kidney injury) 03/15/2024     Past Medical History:   Diagnosis Date    ADHD (attention deficit hyperactivity disorder)     Anxiety     Lepe's esophagus     Benign prostatic hyperplasia Surgery in 12/2021    Brain concussion 11/2023    Clotting disorder Intestinal    Depression     Diverticulitis     Duodenitis     Enteritis     Failure to thrive (child)     Family history of blood clots     Fracture of wrist 1985    Fracture, foot 2019    Hyperlipidemia     Hypertension     Irritable bowel syndrome 2017    Low testosterone     Microcytosis     Pancreatitis     Pneumonia     PONV (postoperative nausea and vomiting)     Seasonal affective disorder     Shoulder injury     Stroke     Unexplained weight loss     Visual impairment 8/2023         PAST SURGICAL HISTORY  Past Surgical History:   Procedure Laterality Date    CARDIAC ELECTROPHYSIOLOGY PROCEDURE N/A 01/24/2024    Procedure: Loop insertion- [employer] LINQ;  Surgeon: Kaela Walker MD;  Location: [facility] CATH INVASIVE LOCATION;  Service: Cardiovascular;  Laterality: N/A;    COLON SURGERY      COLONOSCOPY      COLONOSCOPY N/A 12/11/2022    Procedure: COLONOSCOPY INTO CECUM WITH HOT SNARE POLYPECTOMY;  Surgeon: Albert Gallo MD;  Location: [facility] ENDOSCOPY;  Service: Gastroenterology;  Laterality: N/A;  PRE- GI BLEED  POST- DIVERTICULOSIS, POLYP    COLONOSCOPY N/A  02/14/2024    Procedure: COLONOSCOPY into cecum/terminal ileum;  Surgeon: Albert Gallo MD;  Location:  [facility];  Service: Gastroenterology;  Laterality: N/A;  diverticulosis, hemorrhoids    ENDOSCOPY N/A 12/10/2022    Procedure: ESOPHAGOGASTRODUODENOSCOPY;  Surgeon: Albert Gallo MD;  Location:  [facility];  Service: Gastroenterology;  Laterality: N/A;  PRE- DARK STOOLS  POST- BARRETTS ESOPHAGUS    ENDOSCOPY N/A 02/14/2024    Procedure: ESOPHAGOGASTRODUODENOSCOPY with biopsy;  Surgeon: Albert Gallo MD;  Location: [facility] ENDOSCOPY;  Service: Gastroenterology;  Laterality: N/A;  long segment wilson's, hiatal hernia    FRACTURE SURGERY Right 1980    for broken arm    FRACTURE SURGERY Right     for broken hand    HAND SURGERY  1990    INCISION AND DRAINAGE ABSCESS  2015    anal    PROSTATE SURGERY      SHOULDER SURGERY  2022    SMALL INTESTINE SURGERY      TONSILLECTOMY      TRIGGER POINT INJECTION  2017         FAMILY HISTORY  Family History   Problem Relation Age of Onset    Stroke Mother     Heart disease Mother     Stroke Father     Hyperlipidemia Father     Hypertension Father          SOCIAL HISTORY  Social History     Socioeconomic History    Marital status: Single   Tobacco Use    Smoking status: Never    Smokeless tobacco: Never   Vaping Use    Vaping status: Never Used   Substance and Sexual Activity    Alcohol use: Not Currently     Comment: 12 beers on the weekend    Drug use: Not Currently     Frequency: 1.0 times per week     Types: Marijuana     Comment: patient states he has been smoking marijuania daily x 2 weeks    Sexual activity: Not Currently     Partners: Female     Birth control/protection: Other, Birth control pill         ALLERGIES  Patient has no known allergies.        REVIEW OF SYSTEMS  Review of Systems     All systems reviewed and negative except for those discussed in HPI.       PHYSICAL EXAM    I have reviewed the triage vital signs and nursing notes.    ED Triage  Vitals   Temp Heart Rate Resp BP SpO2   12/17/24 1723 12/17/24 1723 12/17/24 1723 12/17/24 1736 12/17/24 1723   97.1 °F (36.2 °C) 98 26 (!) 191/125 98 %      Temp src Heart Rate Source Patient Position BP Location FiO2 (%)   12/17/24 1723 12/17/24 1723 12/17/24 1736 12/17/24 1736 --   Tympanic Monitor Sitting Right arm        GENERAL: This gentleman is hyperventilating.  He is diaphoretic.  .Vital signs on my initial evaluation has significant hypertension.  He is afebrile.  O2 sat is 100% on room air.  He is tachypneic with his respiratory rate in the low 30s.  HENT: nares patent  Head/neck/ face are symmetric without gross deformity, signs of trauma, or swelling  EYES: no scleral icterus, no conjunctival pallor.  NECK: Supple, no meningismus  CV: regular rhythm, regular rate with intact distal pulses.  No obvious murmur or rub.  RESPIRATORY: Hyperventilating.  Lungs were clear to auscultation.  ABDOMEN: soft and nontender.  MUSCULOSKELETAL: no deformity.  No edema.  Intact distal pulses that are equal strong and symmetric.  NEURO: alert and appropriate, moves all extremities, follows commands.  Patient is tearful at times.  Appears very anxious.  He has no focal motor or sensory changes.  Cranial nerves II through XII are grossly intact with no acute neurologic change.  SKIN: warm, dry    Vital signs and nursing notes reviewed.        LAB RESULTS  No results found for this or any previous visit (from the past 24 hours).      Ordered the above labs and independently reviewed the results.        RADIOLOGY  No Radiology Exams Resulted Within Past 24 Hours    I ordered the above noted radiological studies. Reviewed by me and discussed with radiologist.  See dictation for official radiology interpretation.      PROCEDURES    Procedures      MEDICATIONS GIVEN IN ER    Medications   LORazepam (ATIVAN) injection 1 mg (1 mg Intravenous Given 12/17/24 1820)   iopamidol (ISOVUE-370) 76 % injection 95 mL (95 mL Intravenous  Given by Other 12/17/24 2052)         All labs have been independently reviewed by me.  All radiology studies have been reviewed by me and I discussed with radiologist dictating the report when indicated below.  All EKG's independently viewed and interpreted by me.  Discussion below represents my analysis of pertinent findings related to patient's condition, differential diagnosis, treatment plan and final disposition.        PROGRESS, DATA ANALYSIS, CONSULTS, AND MEDICAL DECISION MAKING    DDx includes CHF, acute coronary syndrome, pulmonary embolism, pneumothorax, pneumonia, asthma/COPD,aspiration,  pulmonary hypertension, metabolic acidosis, deconditioning, anemia, other hematologic etiologies such as CO poisoning, anxiety.   This gentleman is presenting as if he probably is having an acute panic attack.  He is very anxious he is diaphoretic he is tearful.  He states that he does have a lot of stressors going on which he states is part of his life.  He is not suicidal or homicidal.  He is also significantly hypertensive.  He has been noncompliant with his medicines which include Eliquis for atrial fibrillation as well as his blood pressure medicines.  Informed him of my clinical concerns and the test that we will order.  All questions answered at this time.      ED Course as of 12/19/24 0249   Tue Dec 17, 2024   1752 My own independent or potation of the EKG that was done at 5:29 PM reveals a rate of 72 it is sinus rhythm there is some mild intravelar conduction delay normal axis I see some nonspecific T wave changes I do not see any definitive acute injury pattern  I compared this EKG to an EKG that was done on 3/15/2024 and looks very similar [MM]   1752 I independently looked at the chest x-ray.  I did not see any acute active disease.  No pneumothorax no focal pulmonary consolidation no pleural effusions.  No obvious vascular congestion. [MM]   1802 This gentleman has tolerated benzodiazepines in the past.  [MM]   1810 pH, Arterial(!!): 7.626 [MM]   1810 pCO2, Arterial(!!): 17.5 [MM]   1810 HS Troponin T: <6 [MM]   1810 proBNP: 148.0 [MM]   1810 Hemoglobin: 16.3 [MM]   1814 I reassessed this gentleman.  His blood pressure is significantly elevated.  He is hyperventilating but he is improved from my initial evaluation.  Informed him about the results of the chest x-ray initial lab work initial blood gas.  I am going to give him some Ativan.  His blood pressure very well might be elevated because he is not taking his blood pressure medicine and is probably worse from his acute panic attack.  Will continue to monitor that and see what it does after we treat him with Ativan. [MM]   1902 Patient is doing much better.  Patient is sleeping and comfortable.  Blood pressure is elevated but to a much lesser degree.  When he continue to watch him a little bit. [MM]   1902 I did review the radiologist interpretation of the chest x-ray.  No active disease seen. [MM]   1918 Reevaluated this gentleman.  He is looking much better.  Blood pressure is still mildly elevated in the 160s over low 100s.  He states that he feels that he cannot catch his breath.  Feels like he might not be getting enough oxygen.  I will go ahead and check a dimer on him.  I really think this is likely acute panic disorder.  His lab work is very consistent with that.  He is post to be on lisinopril 40 mg and Norvasc 10 mg.  Noncompliant with all of his blood pressure medicine.  If his dimer is negative we will discharge him we will put him back on his blood pressure medicine.  If his dimer is positive we will check a CT scan of his chest.  If the CT scan of the chest is negative the plan will be to discharge back on his blood pressure medicine.  Informed him and his friend in the room on my treatment plan.  All questions answered [MM]   1924 I fear of 5 this patient is on lisinopril 40 mg Norvasc 10 mg.  He again has been noncompliant with his blood pressure  medicine.  Assume he is melania go home I went ahead and refill those prescriptions. [MM]   1924 I did discuss the case with Dr. Villanueva who is melania follow-up with a dimer and CT scan of the chest if it is positive for age adjustment.  I am making the assumption that it probably will be okay and he will be be able to be discharged home. [MM]   1939 I did discuss the case with Dr. Sathish Villanueva.  Patient's dimer is minimally elevated.  Will then do a CT angiogram of the chest.  I anticipate that ultimately he will be able to be going home.  Will prescribed hypertensive medicine for him.  He has benign essential hypertension he is showing no signs of endorgan damage.  He does not need his blood pressure immediately lowered here.  All questions answered. [MM]   2234 Negative delta troponin and unremarkable CTA of the chest.  Discharge per Dr. Headley's plan. [RS]      ED Course User Index  [MM] Jairon Headley MD  [RS] Sathish Villanueva MD       AS OF 02:49 EST VITALS:    BP - 139/86  HR - 55  TEMP - 97.1 °F (36.2 °C) (Tympanic)  02 SATS - 97%    SOCIAL DETERMINANTS OF HEALTH THAT IMPACT OR LIMIT CARE (For example..Homelessness,safe discharge, inability to obtain care, follow up, or prescriptions):      DIAGNOSIS  Final diagnoses:   Dyspnea, unspecified type   Hyperventilation   Benign essential hypertension   H/O noncompliance with medical treatment, presenting hazards to health         DISPOSITION  Patient discharged          DICTATED UTILIZING DRAGON DICTATION    Note Disclaimer: At [facility], we believe that sharing information builds trust and better relationships. You are receiving this note because you recently visited [facility]. It is possible you will see health information before a provider has talked with you about it. This kind of information can be easy to misunderstand. To help you fully understand what it means for your health, we urge you to discuss this note with your provider.       Jairon Headley,  MD  12/19/24 0245

## 2024-12-18 ENCOUNTER — TELEPHONE (OUTPATIENT)
Dept: SPORTS MEDICINE | Facility: CLINIC | Age: 59
End: 2024-12-18

## 2024-12-18 NOTE — TELEPHONE ENCOUNTER
Patient states that he was in the ER on 12-17-24. Shortness of breath and High BP  He is requesting Dr Rodriguez to take a look at the report.   I offered to schedule an appointment for him. He declined.   He stated that his insurance would pick back up in the new year. Lap in insurance.   He sated that he would call back to schedule an appointment.  He called back and has an appointment 1-2- 25    Please advise.

## 2025-01-25 ENCOUNTER — APPOINTMENT (OUTPATIENT)
Dept: CT IMAGING | Facility: HOSPITAL | Age: 60
DRG: 312 | End: 2025-01-25

## 2025-01-25 ENCOUNTER — HOSPITAL ENCOUNTER (INPATIENT)
Facility: HOSPITAL | Age: 60
LOS: 8 days | Discharge: HOME | DRG: 312 | End: 2025-02-02
Attending: EMERGENCY MEDICINE | Admitting: INTERNAL MEDICINE

## 2025-01-25 ENCOUNTER — APPOINTMENT (OUTPATIENT)
Dept: GENERAL RADIOLOGY | Facility: HOSPITAL | Age: 60
DRG: 312 | End: 2025-01-25

## 2025-01-25 DIAGNOSIS — F41.9 ANXIETY: ICD-10-CM

## 2025-01-25 DIAGNOSIS — I16.0 HYPERTENSIVE URGENCY: ICD-10-CM

## 2025-01-25 DIAGNOSIS — R55 SYNCOPE, UNSPECIFIED SYNCOPE TYPE: Primary | ICD-10-CM

## 2025-01-25 PROBLEM — I16.1 HYPERTENSIVE EMERGENCY: Status: ACTIVE | Noted: 2025-01-25

## 2025-01-25 PROBLEM — Z78.9 ALCOHOL USE: Status: ACTIVE | Noted: 2025-01-25

## 2025-01-25 PROBLEM — Z86.73 HISTORY OF CVA (CEREBROVASCULAR ACCIDENT): Status: ACTIVE | Noted: 2023-10-25

## 2025-01-25 PROBLEM — F10.90 ALCOHOL USE: Status: ACTIVE | Noted: 2025-01-25

## 2025-01-25 PROBLEM — Z86.79 HISTORY OF ATRIAL FIBRILLATION: Status: ACTIVE | Noted: 2025-01-25

## 2025-01-25 PROBLEM — F39 MOOD DISORDER: Status: ACTIVE | Noted: 2025-01-25

## 2025-01-25 LAB
ALBUMIN SERPL-MCNC: 4.3 G/DL (ref 3.5–5.2)
ALBUMIN/GLOB SERPL: 1.3 G/DL
ALP SERPL-CCNC: 72 U/L (ref 39–117)
ALT SERPL W P-5'-P-CCNC: 31 U/L (ref 1–41)
ANION GAP SERPL CALCULATED.3IONS-SCNC: 13.6 MMOL/L (ref 5–15)
AST SERPL-CCNC: 22 U/L (ref 1–40)
BASOPHILS # BLD AUTO: 0.04 10*3/MM3 (ref 0–0.2)
BASOPHILS NFR BLD AUTO: 0.5 % (ref 0–1.5)
BILIRUB SERPL-MCNC: 0.5 MG/DL (ref 0–1.2)
BILIRUB UR QL STRIP: NEGATIVE
BUN SERPL-MCNC: 12 MG/DL (ref 6–20)
BUN/CREAT SERPL: 14.6 (ref 7–25)
CALCIUM SPEC-SCNC: 9 MG/DL (ref 8.6–10.5)
CHLORIDE SERPL-SCNC: 103 MMOL/L (ref 98–107)
CLARITY UR: CLEAR
CO2 SERPL-SCNC: 23.4 MMOL/L (ref 22–29)
COLOR UR: YELLOW
CREAT SERPL-MCNC: 0.82 MG/DL (ref 0.76–1.27)
D DIMER PPP FEU-MCNC: <0.27 MCGFEU/ML (ref 0–0.57)
DEPRECATED RDW RBC AUTO: 42.2 FL (ref 37–54)
EGFRCR SERPLBLD CKD-EPI 2021: 102.5 ML/MIN/1.73
EOSINOPHIL # BLD AUTO: 0.62 10*3/MM3 (ref 0–0.4)
EOSINOPHIL NFR BLD AUTO: 7.3 % (ref 0.3–6.2)
ERYTHROCYTE [DISTWIDTH] IN BLOOD BY AUTOMATED COUNT: 13.6 % (ref 12.3–15.4)
ETHANOL BLD-MCNC: <10 MG/DL (ref 0–10)
ETHANOL UR QL: <0.01 %
GEN 5 1HR TROPONIN T REFLEX: <6 NG/L
GLOBULIN UR ELPH-MCNC: 3.3 GM/DL
GLUCOSE SERPL-MCNC: 95 MG/DL (ref 65–99)
GLUCOSE UR STRIP-MCNC: NEGATIVE MG/DL
HCT VFR BLD AUTO: 48.3 % (ref 37.5–51)
HGB BLD-MCNC: 16.1 G/DL (ref 13–17.7)
HGB UR QL STRIP.AUTO: NEGATIVE
HOLD SPECIMEN: NORMAL
HOLD SPECIMEN: NORMAL
IMM GRANULOCYTES # BLD AUTO: 0.02 10*3/MM3 (ref 0–0.05)
IMM GRANULOCYTES NFR BLD AUTO: 0.2 % (ref 0–0.5)
KETONES UR QL STRIP: NEGATIVE
LEUKOCYTE ESTERASE UR QL STRIP.AUTO: NEGATIVE
LYMPHOCYTES # BLD AUTO: 1.88 10*3/MM3 (ref 0.7–3.1)
LYMPHOCYTES NFR BLD AUTO: 22 % (ref 19.6–45.3)
MAGNESIUM SERPL-MCNC: 2.1 MG/DL (ref 1.6–2.6)
MCH RBC QN AUTO: 28.6 PG (ref 26.6–33)
MCHC RBC AUTO-ENTMCNC: 33.3 G/DL (ref 31.5–35.7)
MCV RBC AUTO: 85.9 FL (ref 79–97)
MONOCYTES # BLD AUTO: 0.61 10*3/MM3 (ref 0.1–0.9)
MONOCYTES NFR BLD AUTO: 7.1 % (ref 5–12)
NEUTROPHILS NFR BLD AUTO: 5.38 10*3/MM3 (ref 1.7–7)
NEUTROPHILS NFR BLD AUTO: 62.9 % (ref 42.7–76)
NITRITE UR QL STRIP: NEGATIVE
NRBC BLD AUTO-RTO: 0 /100 WBC (ref 0–0.2)
NT-PROBNP SERPL-MCNC: 50.4 PG/ML (ref 0–900)
PH UR STRIP.AUTO: 7.5 [PH] (ref 5–8)
PLATELET # BLD AUTO: 273 10*3/MM3 (ref 140–450)
PMV BLD AUTO: 9.6 FL (ref 6–12)
POTASSIUM SERPL-SCNC: 4.1 MMOL/L (ref 3.5–5.2)
PROT SERPL-MCNC: 7.6 G/DL (ref 6–8.5)
PROT UR QL STRIP: NEGATIVE
RBC # BLD AUTO: 5.62 10*6/MM3 (ref 4.14–5.8)
SODIUM SERPL-SCNC: 140 MMOL/L (ref 136–145)
SP GR UR STRIP: 1.02 (ref 1–1.03)
TROPONIN T NUMERIC DELTA: NORMAL
TROPONIN T SERPL HS-MCNC: <6 NG/L
UROBILINOGEN UR QL STRIP: NORMAL
WBC NRBC COR # BLD AUTO: 8.55 10*3/MM3 (ref 3.4–10.8)
WHOLE BLOOD HOLD COAG: NORMAL

## 2025-01-25 PROCEDURE — 83880 ASSAY OF NATRIURETIC PEPTIDE: CPT | Performed by: PHYSICIAN ASSISTANT

## 2025-01-25 PROCEDURE — G0378 HOSPITAL OBSERVATION PER HR: HCPCS

## 2025-01-25 PROCEDURE — 80053 COMPREHEN METABOLIC PANEL: CPT | Performed by: PHYSICIAN ASSISTANT

## 2025-01-25 PROCEDURE — 85379 FIBRIN DEGRADATION QUANT: CPT | Performed by: PHYSICIAN ASSISTANT

## 2025-01-25 PROCEDURE — 71275 CT ANGIOGRAPHY CHEST: CPT

## 2025-01-25 PROCEDURE — 70450 CT HEAD/BRAIN W/O DYE: CPT

## 2025-01-25 PROCEDURE — 71045 X-RAY EXAM CHEST 1 VIEW: CPT

## 2025-01-25 PROCEDURE — 83735 ASSAY OF MAGNESIUM: CPT | Performed by: PHYSICIAN ASSISTANT

## 2025-01-25 PROCEDURE — 36415 COLL VENOUS BLD VENIPUNCTURE: CPT

## 2025-01-25 PROCEDURE — 84484 ASSAY OF TROPONIN QUANT: CPT | Performed by: PHYSICIAN ASSISTANT

## 2025-01-25 PROCEDURE — 25010000002 LABETALOL 5 MG/ML SOLUTION: Performed by: EMERGENCY MEDICINE

## 2025-01-25 PROCEDURE — 81003 URINALYSIS AUTO W/O SCOPE: CPT | Performed by: PHYSICIAN ASSISTANT

## 2025-01-25 PROCEDURE — 99291 CRITICAL CARE FIRST HOUR: CPT

## 2025-01-25 PROCEDURE — 25010000002 HYDRALAZINE PER 20 MG: Performed by: EMERGENCY MEDICINE

## 2025-01-25 PROCEDURE — 25810000003 SODIUM CHLORIDE 0.9 % SOLUTION: Performed by: INTERNAL MEDICINE

## 2025-01-25 PROCEDURE — 93010 ELECTROCARDIOGRAM REPORT: CPT | Performed by: INTERNAL MEDICINE

## 2025-01-25 PROCEDURE — 93005 ELECTROCARDIOGRAM TRACING: CPT | Performed by: PHYSICIAN ASSISTANT

## 2025-01-25 PROCEDURE — 82077 ASSAY SPEC XCP UR&BREATH IA: CPT | Performed by: PHYSICIAN ASSISTANT

## 2025-01-25 PROCEDURE — 25510000001 IOPAMIDOL PER 1 ML: Performed by: EMERGENCY MEDICINE

## 2025-01-25 PROCEDURE — 85025 COMPLETE CBC W/AUTO DIFF WBC: CPT | Performed by: PHYSICIAN ASSISTANT

## 2025-01-25 PROCEDURE — 25010000002 THIAMINE PER 100 MG: Performed by: INTERNAL MEDICINE

## 2025-01-25 RX ORDER — SODIUM CHLORIDE 0.9 % (FLUSH) 0.9 %
10 SYRINGE (ML) INJECTION EVERY 12 HOURS SCHEDULED
Status: DISCONTINUED | OUTPATIENT
Start: 2025-01-25 | End: 2025-01-27

## 2025-01-25 RX ORDER — BISACODYL 5 MG/1
5 TABLET, DELAYED RELEASE ORAL DAILY PRN
Status: DISCONTINUED | OUTPATIENT
Start: 2025-01-25 | End: 2025-02-02 | Stop reason: HOSPADM

## 2025-01-25 RX ORDER — MULTIPLE VITAMINS W/ MINERALS TAB 9MG-400MCG
1 TAB ORAL DAILY
Status: DISCONTINUED | OUTPATIENT
Start: 2025-01-25 | End: 2025-02-02 | Stop reason: HOSPADM

## 2025-01-25 RX ORDER — METOPROLOL TARTRATE 50 MG
50 TABLET ORAL EVERY 12 HOURS SCHEDULED
Status: DISCONTINUED | OUTPATIENT
Start: 2025-01-25 | End: 2025-01-29

## 2025-01-25 RX ORDER — LABETALOL HYDROCHLORIDE 5 MG/ML
20 INJECTION, SOLUTION INTRAVENOUS ONCE
Status: COMPLETED | OUTPATIENT
Start: 2025-01-25 | End: 2025-01-25

## 2025-01-25 RX ORDER — LORAZEPAM 1 MG/1
1 TABLET ORAL DAILY
Status: DISCONTINUED | OUTPATIENT
Start: 2025-01-29 | End: 2025-01-28

## 2025-01-25 RX ORDER — HYDRALAZINE HYDROCHLORIDE 20 MG/ML
20 INJECTION INTRAMUSCULAR; INTRAVENOUS ONCE
Status: COMPLETED | OUTPATIENT
Start: 2025-01-25 | End: 2025-01-25

## 2025-01-25 RX ORDER — NITROGLYCERIN 0.4 MG/1
0.4 TABLET SUBLINGUAL
Status: DISCONTINUED | OUTPATIENT
Start: 2025-01-25 | End: 2025-02-02 | Stop reason: HOSPADM

## 2025-01-25 RX ORDER — AMLODIPINE BESYLATE 10 MG/1
10 TABLET ORAL DAILY
COMMUNITY
End: 2025-02-02 | Stop reason: HOSPADM

## 2025-01-25 RX ORDER — LORAZEPAM 0.5 MG/1
0.5 TABLET ORAL ONCE
Status: COMPLETED | OUTPATIENT
Start: 2025-01-25 | End: 2025-01-25

## 2025-01-25 RX ORDER — BISACODYL 10 MG
10 SUPPOSITORY, RECTAL RECTAL DAILY PRN
Status: DISCONTINUED | OUTPATIENT
Start: 2025-01-25 | End: 2025-02-02 | Stop reason: HOSPADM

## 2025-01-25 RX ORDER — MIDAZOLAM HYDROCHLORIDE 1 MG/ML
4 INJECTION, SOLUTION INTRAMUSCULAR; INTRAVENOUS
Status: DISCONTINUED | OUTPATIENT
Start: 2025-01-25 | End: 2025-01-27 | Stop reason: ALTCHOICE

## 2025-01-25 RX ORDER — IOPAMIDOL 755 MG/ML
100 INJECTION, SOLUTION INTRAVASCULAR
Status: COMPLETED | OUTPATIENT
Start: 2025-01-25 | End: 2025-01-25

## 2025-01-25 RX ORDER — MIDAZOLAM HYDROCHLORIDE 1 MG/ML
2 INJECTION, SOLUTION INTRAMUSCULAR; INTRAVENOUS
Status: DISCONTINUED | OUTPATIENT
Start: 2025-01-25 | End: 2025-01-27 | Stop reason: ALTCHOICE

## 2025-01-25 RX ORDER — LABETALOL HYDROCHLORIDE 5 MG/ML
20 INJECTION, SOLUTION INTRAVENOUS ONCE
Status: DISCONTINUED | OUTPATIENT
Start: 2025-01-25 | End: 2025-01-25

## 2025-01-25 RX ORDER — ACETAMINOPHEN 650 MG/1
650 SUPPOSITORY RECTAL EVERY 4 HOURS PRN
Status: DISCONTINUED | OUTPATIENT
Start: 2025-01-25 | End: 2025-02-02 | Stop reason: HOSPADM

## 2025-01-25 RX ORDER — ATORVASTATIN CALCIUM 20 MG/1
40 TABLET, FILM COATED ORAL NIGHTLY
Status: DISCONTINUED | OUTPATIENT
Start: 2025-01-25 | End: 2025-02-02 | Stop reason: HOSPADM

## 2025-01-25 RX ORDER — LORAZEPAM 1 MG/1
2 TABLET ORAL EVERY 6 HOURS
Status: COMPLETED | OUTPATIENT
Start: 2025-01-25 | End: 2025-01-26

## 2025-01-25 RX ORDER — FOLIC ACID 1 MG/1
1 TABLET ORAL DAILY
Status: DISCONTINUED | OUTPATIENT
Start: 2025-01-25 | End: 2025-01-27

## 2025-01-25 RX ORDER — CARVEDILOL 3.12 MG/1
3.12 TABLET ORAL 2 TIMES DAILY WITH MEALS
COMMUNITY

## 2025-01-25 RX ORDER — ACETAMINOPHEN 325 MG/1
650 TABLET ORAL EVERY 4 HOURS PRN
Status: DISCONTINUED | OUTPATIENT
Start: 2025-01-25 | End: 2025-02-02 | Stop reason: HOSPADM

## 2025-01-25 RX ORDER — ONDANSETRON 2 MG/ML
4 INJECTION INTRAMUSCULAR; INTRAVENOUS EVERY 6 HOURS PRN
Status: DISCONTINUED | OUTPATIENT
Start: 2025-01-25 | End: 2025-02-02 | Stop reason: HOSPADM

## 2025-01-25 RX ORDER — LISINOPRIL 40 MG/1
40 TABLET ORAL DAILY
COMMUNITY
End: 2025-02-02 | Stop reason: HOSPADM

## 2025-01-25 RX ORDER — ACETAMINOPHEN 160 MG/5ML
650 SOLUTION ORAL EVERY 4 HOURS PRN
Status: DISCONTINUED | OUTPATIENT
Start: 2025-01-25 | End: 2025-02-02 | Stop reason: HOSPADM

## 2025-01-25 RX ORDER — AMLODIPINE BESYLATE 10 MG/1
10 TABLET ORAL DAILY
Status: DISCONTINUED | OUTPATIENT
Start: 2025-01-26 | End: 2025-02-01

## 2025-01-25 RX ORDER — HYDROXYZINE HYDROCHLORIDE 25 MG/1
50 TABLET, FILM COATED ORAL ONCE
Status: COMPLETED | OUTPATIENT
Start: 2025-01-25 | End: 2025-01-25

## 2025-01-25 RX ORDER — ONDANSETRON 4 MG/1
4 TABLET, ORALLY DISINTEGRATING ORAL EVERY 6 HOURS PRN
Status: DISCONTINUED | OUTPATIENT
Start: 2025-01-25 | End: 2025-02-02 | Stop reason: HOSPADM

## 2025-01-25 RX ORDER — LISINOPRIL 20 MG/1
40 TABLET ORAL DAILY
Status: DISCONTINUED | OUTPATIENT
Start: 2025-01-25 | End: 2025-02-02 | Stop reason: HOSPADM

## 2025-01-25 RX ORDER — THIAMINE HYDROCHLORIDE 100 MG/ML
200 INJECTION, SOLUTION INTRAMUSCULAR; INTRAVENOUS EVERY 8 HOURS SCHEDULED
Status: COMPLETED | OUTPATIENT
Start: 2025-01-25 | End: 2025-01-30

## 2025-01-25 RX ORDER — LORAZEPAM 1 MG/1
1 TABLET ORAL
Status: DISCONTINUED | OUTPATIENT
Start: 2025-01-25 | End: 2025-01-27 | Stop reason: ALTCHOICE

## 2025-01-25 RX ORDER — HYDRALAZINE HYDROCHLORIDE 25 MG/1
25 TABLET, FILM COATED ORAL 3 TIMES DAILY
COMMUNITY
End: 2025-02-02 | Stop reason: HOSPADM

## 2025-01-25 RX ORDER — LORAZEPAM 1 MG/1
1 TABLET ORAL EVERY 6 HOURS
Status: DISCONTINUED | OUTPATIENT
Start: 2025-01-26 | End: 2025-01-27

## 2025-01-25 RX ORDER — LORAZEPAM 1 MG/1
2 TABLET ORAL
Status: DISCONTINUED | OUTPATIENT
Start: 2025-01-25 | End: 2025-01-27 | Stop reason: ALTCHOICE

## 2025-01-25 RX ORDER — SODIUM CHLORIDE 9 MG/ML
40 INJECTION, SOLUTION INTRAVENOUS AS NEEDED
Status: DISCONTINUED | OUTPATIENT
Start: 2025-01-25 | End: 2025-02-02 | Stop reason: HOSPADM

## 2025-01-25 RX ORDER — PANTOPRAZOLE SODIUM 40 MG/10ML
40 INJECTION, POWDER, LYOPHILIZED, FOR SOLUTION INTRAVENOUS
Status: DISCONTINUED | OUTPATIENT
Start: 2025-01-25 | End: 2025-01-27

## 2025-01-25 RX ORDER — SODIUM CHLORIDE 0.9 % (FLUSH) 0.9 %
10 SYRINGE (ML) INJECTION AS NEEDED
Status: DISCONTINUED | OUTPATIENT
Start: 2025-01-25 | End: 2025-02-02 | Stop reason: HOSPADM

## 2025-01-25 RX ORDER — POLYETHYLENE GLYCOL 3350 17 G/17G
17 POWDER, FOR SOLUTION ORAL DAILY PRN
Status: DISCONTINUED | OUTPATIENT
Start: 2025-01-25 | End: 2025-02-02 | Stop reason: HOSPADM

## 2025-01-25 RX ORDER — HYDRALAZINE HYDROCHLORIDE 20 MG/ML
10 INJECTION INTRAMUSCULAR; INTRAVENOUS EVERY 6 HOURS PRN
Status: DISCONTINUED | OUTPATIENT
Start: 2025-01-25 | End: 2025-02-01

## 2025-01-25 RX ORDER — ASPIRIN 81 MG/1
81 TABLET, CHEWABLE ORAL DAILY
Status: DISCONTINUED | OUTPATIENT
Start: 2025-01-25 | End: 2025-02-02 | Stop reason: HOSPADM

## 2025-01-25 RX ORDER — AMOXICILLIN 250 MG
2 CAPSULE ORAL 2 TIMES DAILY
Status: DISCONTINUED | OUTPATIENT
Start: 2025-01-25 | End: 2025-02-02 | Stop reason: HOSPADM

## 2025-01-25 RX ORDER — SODIUM CHLORIDE 9 MG/ML
100 INJECTION, SOLUTION INTRAVENOUS CONTINUOUS
Status: ACTIVE | OUTPATIENT
Start: 2025-01-25 | End: 2025-01-26

## 2025-01-25 RX ORDER — LORAZEPAM 1 MG/1
1 TABLET ORAL EVERY 12 HOURS SCHEDULED
Status: DISCONTINUED | OUTPATIENT
Start: 2025-01-27 | End: 2025-01-28

## 2025-01-25 RX ADMIN — Medication 1 TABLET: at 17:05

## 2025-01-25 RX ADMIN — ASPIRIN 81 MG CHEWABLE TABLET 81 MG: 81 TABLET CHEWABLE at 17:05

## 2025-01-25 RX ADMIN — HYDROXYZINE HYDROCHLORIDE 50 MG: 25 TABLET, FILM COATED ORAL at 12:53

## 2025-01-25 RX ADMIN — SENNOSIDES AND DOCUSATE SODIUM 2 TABLET: 50; 8.6 TABLET ORAL at 21:41

## 2025-01-25 RX ADMIN — METOPROLOL TARTRATE 50 MG: 50 TABLET, FILM COATED ORAL at 21:41

## 2025-01-25 RX ADMIN — LABETALOL HYDROCHLORIDE 20 MG: 5 INJECTION, SOLUTION INTRAVENOUS at 10:02

## 2025-01-25 RX ADMIN — FOLIC ACID 1 MG: 1 TABLET ORAL at 17:05

## 2025-01-25 RX ADMIN — ATORVASTATIN CALCIUM 40 MG: 20 TABLET, FILM COATED ORAL at 21:41

## 2025-01-25 RX ADMIN — SODIUM CHLORIDE 100 ML/HR: 9 INJECTION, SOLUTION INTRAVENOUS at 17:06

## 2025-01-25 RX ADMIN — THIAMINE HYDROCHLORIDE 200 MG: 100 INJECTION, SOLUTION INTRAMUSCULAR; INTRAVENOUS at 21:41

## 2025-01-25 RX ADMIN — PANTOPRAZOLE SODIUM 40 MG: 40 INJECTION, POWDER, FOR SOLUTION INTRAVENOUS at 17:05

## 2025-01-25 RX ADMIN — LISINOPRIL 40 MG: 20 TABLET ORAL at 17:05

## 2025-01-25 RX ADMIN — LORAZEPAM 2 MG: 1 TABLET ORAL at 17:05

## 2025-01-25 RX ADMIN — LORAZEPAM 0.5 MG: 0.5 TABLET ORAL at 11:14

## 2025-01-25 RX ADMIN — Medication 10 ML: at 21:42

## 2025-01-25 RX ADMIN — IOPAMIDOL 95 ML: 755 INJECTION, SOLUTION INTRAVENOUS at 11:41

## 2025-01-25 RX ADMIN — HYDRALAZINE HYDROCHLORIDE 20 MG: 20 INJECTION INTRAMUSCULAR; INTRAVENOUS at 10:47

## 2025-01-25 RX ADMIN — LORAZEPAM 2 MG: 1 TABLET ORAL at 23:40

## 2025-01-25 NOTE — ED PROVIDER NOTES
EMERGENCY DEPARTMENT ENCOUNTER      PCP: Akash Rodriguez Jr., DO  Patient Care Team:  Akash Rodriguez Jr., DO as PCP - General (Sports Medicine)   Independent Historians: Patient    HPI:  Chief Complaint: Syncope  A complete HPI/ROS/PMH/PSH/SH/FH are unobtainable due to: None    Chronic or social conditions impacting patient care (social determinants of health): None    Context: Flako Coreas Sr. is a 59 y.o. male with history of CVA, alcohol use, hypertension who presents to the ED c/o acute syncope.  Patient states he got out of bed around 2:00 this morning due to being unable to sleep and fell.  He does not remember hitting the floor but does have abrasion to his face.  He denies having any chest pain, lightheadedness prior to syncopal event.  He reports over the past few weeks feeling he is unable to catch his breath.  Previously he was prescribed Eliquis however he states he was taken off of this due to history of syncope.  Patient states he had lost his insurance which led to many missed doctors appointments and follow-up for these issues.  He does have a loop recorder in place.    Review of prior external notes and/or external test results outside of this encounter: Reviewed data from [facility] 12/10/24-1/2/25: Loop recorder showed 6 AF events with longest lasting 1 hr 4 minutes on 12/31/24. Document suggests patient is anticoagulated (pt reports not taking eliquis).       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    Anxiety about health 02/26/2024    Vertebral artery stenosis, left 02/26/2024    Memory loss 02/26/2024    History of alcohol dependence 02/26/2024    Vasovagal syncope 03/15/2024    Elevated lipoprotein(a) 04/03/2024    Elevated coronary artery calcium score 04/03/2024    PAF (paroxysmal atrial fibrillation) 07/03/2024     Resolved Ambulatory Problems     Diagnosis Date Noted    Essential hypertension 06/26/2017    Abdominal pain 06/20/2018    Generalized abdominal pain 12/08/2022    History of esophagitis 12/08/2022    Gastrointestinal hemorrhage 12/08/2022    Acute CVA (cerebrovascular accident) 08/17/2023    Hypertensive emergency 08/19/2023    Ataxia 08/19/2023    CVA (cerebral vascular accident) 08/22/2023    Acute CVA (cerebrovascular accident) 10/23/2023    TIA (transient ischemic attack) 10/26/2023    Concussion 10/27/2023    Fall 10/27/2023    Head injury, closed, with concussion 10/27/2023    CHI (closed head injury), initial encounter 12/10/2023    Recurrent syncope 01/19/2024    Closed head injury 02/12/2024    Hospital discharge follow-up 02/26/2024    Dizziness 02/26/2024    ALLISON (acute kidney injury) 03/15/2024     Past Medical History:   Diagnosis Date    ADHD (attention deficit hyperactivity disorder)     Anxiety     Benign prostatic hyperplasia Surgery in 12/2021    Brain concussion 11/2023    Clotting disorder Intestinal    Depression     Diverticulitis     Duodenitis     Enteritis     Failure to thrive (child)     Family history of blood clots     Fracture of wrist 1985    Fracture, foot 2019    GERD (gastroesophageal reflux disease)     Hypertension     Irritable bowel syndrome 2017    Low testosterone     Microcytosis     Pancreatitis     Pneumonia     PONV (postoperative nausea and vomiting)     Seasonal affective disorder     Shoulder injury     Stroke     Unexplained weight loss     Visual  impairment 8/2023       The patient has started, but not completed, their COVID-19 vaccination series.    PAST SURGICAL HISTORY  Past Surgical History:   Procedure Laterality Date    CARDIAC ELECTROPHYSIOLOGY PROCEDURE N/A 01/24/2024    Procedure: Loop insertion- [employer] LINQ;  Surgeon: Kaela Walker MD;  Location: [facility] CATH INVASIVE LOCATION;  Service: Cardiovascular;  Laterality: N/A;    COLON SURGERY      COLONOSCOPY      COLONOSCOPY N/A 12/11/2022    Procedure: COLONOSCOPY INTO CECUM WITH HOT SNARE POLYPECTOMY;  Surgeon: Albert Gallo MD;  Location: [facility] ENDOSCOPY;  Service: Gastroenterology;  Laterality: N/A;  PRE- GI BLEED  POST- DIVERTICULOSIS, POLYP    COLONOSCOPY N/A 02/14/2024    Procedure: COLONOSCOPY into cecum/terminal ileum;  Surgeon: Ablert Gallo MD;  Location: [facility] ENDOSCOPY;  Service: Gastroenterology;  Laterality: N/A;  diverticulosis, hemorrhoids    ENDOSCOPY N/A 12/10/2022    Procedure: ESOPHAGOGASTRODUODENOSCOPY;  Surgeon: Albert Gallo MD;  Location: [facility] ENDOSCOPY;  Service: Gastroenterology;  Laterality: N/A;  PRE- DARK STOOLS  POST- BARRETTS ESOPHAGUS    ENDOSCOPY N/A 02/14/2024    Procedure: ESOPHAGOGASTRODUODENOSCOPY with biopsy;  Surgeon: Albert Gallo MD;  Location: [facility] ENDOSCOPY;  Service: Gastroenterology;  Laterality: N/A;  long segment wilson's, hiatal hernia    FRACTURE SURGERY Right 1980    for broken arm    FRACTURE SURGERY Right     for broken hand    HAND SURGERY  1990    INCISION AND DRAINAGE ABSCESS  2015    anal    PROSTATE SURGERY      SHOULDER SURGERY  2022    SMALL INTESTINE SURGERY      TONSILLECTOMY      TRIGGER POINT INJECTION  2017         FAMILY HISTORY  Family History   Problem Relation Age of Onset    Stroke Mother     Heart disease Mother     Stroke Father     Hyperlipidemia Father     Hypertension Father          SOCIAL HISTORY  Social History     Socioeconomic History    Marital status: Single   Tobacco Use    Smoking status: Never     Smokeless tobacco: Never   Vaping Use    Vaping status: Never Used   Substance and Sexual Activity    Alcohol use: Not Currently     Comment: 12 beers on the weekend    Drug use: Not Currently     Frequency: 1.0 times per week     Types: Marijuana     Comment: patient states he has been smoking marijuania daily x 2 weeks    Sexual activity: Not Currently     Partners: Female     Birth control/protection: Other, Birth control pill         ALLERGIES  Patient has no known allergies.        REVIEW OF SYSTEMS  Review of Systems   Constitutional:  Negative for fever.   Respiratory:  Positive for shortness of breath.    Cardiovascular:  Negative for chest pain.   Gastrointestinal:  Negative for abdominal pain.   Neurological:  Positive for syncope.   Psychiatric/Behavioral:  The patient is nervous/anxious.         All systems reviewed and negative except for those discussed in HPI.       PHYSICAL EXAM    I have reviewed the triage vital signs and nursing notes.    ED Triage Vitals   Temp Heart Rate Resp BP SpO2   01/25/25 0921 01/25/25 0921 01/25/25 0921 01/25/25 0925 01/25/25 0921   97.5 °F (36.4 °C) 105 16 (!) 197/150 98 %      Temp src Heart Rate Source Patient Position BP Location FiO2 (%)   -- -- -- -- --              Physical Exam  GENERAL: alert, anxious appearing, non toxic, hypertensive  SKIN: Warm, dry  HENT: Normocephalic, abrasion to the nose  EYES: no scleral icterus  CV: regular rhythm, regular rate  RESPIRATORY: normal effort, lungs clear  ABDOMEN: soft, nontender, nondistended  MUSCULOSKELETAL: no deformity, no peripheral edema  NEURO: alert, moves all extremities, follows commands          LAB RESULTS  Recent Results (from the past 24 hours)   Comprehensive Metabolic Panel    Collection Time: 01/25/25  9:28 AM    Specimen: Blood   Result Value Ref Range    Glucose 95 65 - 99 mg/dL    BUN 12 6 - 20 mg/dL    Creatinine 0.82 0.76 - 1.27 mg/dL    Sodium 140 136 - 145 mmol/L    Potassium 4.1 3.5 - 5.2 mmol/L     Chloride 103 98 - 107 mmol/L    CO2 23.4 22.0 - 29.0 mmol/L    Calcium 9.0 8.6 - 10.5 mg/dL    Total Protein 7.6 6.0 - 8.5 g/dL    Albumin 4.3 3.5 - 5.2 g/dL    ALT (SGPT) 31 1 - 41 U/L    AST (SGOT) 22 1 - 40 U/L    Alkaline Phosphatase 72 39 - 117 U/L    Total Bilirubin 0.5 0.0 - 1.2 mg/dL    Globulin 3.3 gm/dL    A/G Ratio 1.3 g/dL    BUN/Creatinine Ratio 14.6 7.0 - 25.0    Anion Gap 13.6 5.0 - 15.0 mmol/L    eGFR 102.5 >60.0 mL/min/1.73   High Sensitivity Troponin T    Collection Time: 01/25/25  9:28 AM    Specimen: Blood   Result Value Ref Range    HS Troponin T <6 <22 ng/L   CBC Auto Differential    Collection Time: 01/25/25  9:28 AM    Specimen: Blood   Result Value Ref Range    WBC 8.55 3.40 - 10.80 10*3/mm3    RBC 5.62 4.14 - 5.80 10*6/mm3    Hemoglobin 16.1 13.0 - 17.7 g/dL    Hematocrit 48.3 37.5 - 51.0 %    MCV 85.9 79.0 - 97.0 fL    MCH 28.6 26.6 - 33.0 pg    MCHC 33.3 31.5 - 35.7 g/dL    RDW 13.6 12.3 - 15.4 %    RDW-SD 42.2 37.0 - 54.0 fl    MPV 9.6 6.0 - 12.0 fL    Platelets 273 140 - 450 10*3/mm3    Neutrophil % 62.9 42.7 - 76.0 %    Lymphocyte % 22.0 19.6 - 45.3 %    Monocyte % 7.1 5.0 - 12.0 %    Eosinophil % 7.3 (H) 0.3 - 6.2 %    Basophil % 0.5 0.0 - 1.5 %    Immature Grans % 0.2 0.0 - 0.5 %    Neutrophils, Absolute 5.38 1.70 - 7.00 10*3/mm3    Lymphocytes, Absolute 1.88 0.70 - 3.10 10*3/mm3    Monocytes, Absolute 0.61 0.10 - 0.90 10*3/mm3    Eosinophils, Absolute 0.62 (H) 0.00 - 0.40 10*3/mm3    Basophils, Absolute 0.04 0.00 - 0.20 10*3/mm3    Immature Grans, Absolute 0.02 0.00 - 0.05 10*3/mm3    nRBC 0.0 0.0 - 0.2 /100 WBC   BNP    Collection Time: 01/25/25  9:28 AM    Specimen: Blood   Result Value Ref Range    proBNP 50.4 0.0 - 900.0 pg/mL   Magnesium    Collection Time: 01/25/25  9:28 AM    Specimen: Blood   Result Value Ref Range    Magnesium 2.1 1.6 - 2.6 mg/dL   Ethanol    Collection Time: 01/25/25  9:28 AM    Specimen: Blood   Result Value Ref Range    Ethanol <10 0 - 10 mg/dL     Ethanol % <0.010 %   Gold Top - SST    Collection Time: 01/25/25  9:31 AM   Result Value Ref Range    Extra Tube Hold for add-ons.    Gray Top    Collection Time: 01/25/25  9:31 AM   Result Value Ref Range    Extra Tube Hold for add-ons.    Light Blue Top    Collection Time: 01/25/25  9:31 AM   Result Value Ref Range    Extra Tube Hold for add-ons.    D-dimer, Quantitative    Collection Time: 01/25/25  9:31 AM    Specimen: Blood   Result Value Ref Range    D-Dimer, Quantitative <0.27 0.00 - 0.57 MCGFEU/mL   ECG 12 Lead Syncope    Collection Time: 01/25/25  9:32 AM   Result Value Ref Range    QT Interval 408 ms    QTC Interval 441 ms   High Sensitivity Troponin T 1Hr    Collection Time: 01/25/25 10:34 AM    Specimen: Blood   Result Value Ref Range    HS Troponin T <6 <22 ng/L    Troponin T Numeric Delta     Urinalysis With Microscopic If Indicated (No Culture) - Urine, Clean Catch    Collection Time: 01/25/25 10:42 AM    Specimen: Urine, Clean Catch   Result Value Ref Range    Color, UA Yellow Yellow, Straw    Appearance, UA Clear Clear    pH, UA 7.5 5.0 - 8.0    Specific Gravity, UA 1.018 1.005 - 1.030    Glucose, UA Negative Negative    Ketones, UA Negative Negative    Bilirubin, UA Negative Negative    Blood, UA Negative Negative    Protein, UA Negative Negative    Leuk Esterase, UA Negative Negative    Nitrite, UA Negative Negative    Urobilinogen, UA 0.2 E.U./dL 0.2 - 1.0 E.U./dL       Ordered the above labs and independently reviewed and interpreted the results.        RADIOLOGY  CT Angiogram Chest Pulmonary Embolism    Result Date: 1/25/2025  CT ANGIOGRAM OF THE CHEST WITH CONTRAST INCLUDING RECONSTRUCTION IMAGES 01/25/2025  HISTORY: Shortness of breath. Possible pulmonary embolus.  Following the intravenous contrast injection, CT angiography was performed through the chest. Sagittal, coronal and 3D reconstruction images were reviewed.  The pulmonary arterial system is well opacified with no evidence of  pulmonary embolus. No pathologically enlarged hilar or mediastinal lymph nodes are seen. There is some coronary calcification.  No lung masses or significant pulmonary infiltrates are seen.      No evidence of pulmonary embolus.  Radiation dose reduction techniques were utilized, including automated exposure control and exposure modulation based on body size.       CT Head Without Contrast    Result Date: 1/25/2025  CT OF THE BRAIN WITHOUT CONTRAST 01/25/2025  HISTORY: Syncope. Head injury.  Axial images were obtained through the brain without intravenous contrast.  FINDINGS: There is mild diffuse atrophy. There is some ossification along the anterior falx. There is no evidence of acute infarction, hemorrhage, midline shift or mass effect.  No bony abnormalities are seen.      No acute process.    Radiation dose reduction techniques were utilized, including automated exposure control and exposure modulation based on body size.       XR Chest 1 View    Result Date: 1/25/2025  XR CHEST 1 VW-1/25/2025  HISTORY: Syncope.  The heart size is within normal limits. Lungs appear free of acute infiltrates. Probable loop recorder overlies the left hemithorax. Bones and soft tissues are otherwise unremarkable.      1. No acute process.   This report was finalized on 1/25/2025 9:57 AM by Dr. Salvador Arita M.D on Workstation: [facility]       I ordered the above noted radiological studies. Independently reviewed and interpreted by me.  See dictation for official radiology interpretation.      PROCEDURES    Procedures      MEDICATIONS GIVEN IN ER    Medications   labetalol (NORMODYNE,TRANDATE) injection 20 mg (20 mg Intravenous Given 1/25/25 1002)   hydrALAZINE (APRESOLINE) injection 20 mg (20 mg Intravenous Given 1/25/25 1047)   LORazepam (ATIVAN) tablet 0.5 mg (0.5 mg Oral Given 1/25/25 1114)   iopamidol (ISOVUE-370) 76 % injection 100 mL (95 mL Intravenous Given by Other 1/25/25 1141)   hydrOXYzine (ATARAX) tablet 50 mg (50  mg Oral Given 1/25/25 1253)         PROGRESS, DATA ANALYSIS, CONSULTS, AND MEDICAL DECISION MAKING    All labs have been independently reviewed and interpreted by me.  All radiology studies have been independently reviewed and interpreted by me and discussed with radiologist dictating the report.   EKG's independently reviewed and interpreted by me.  Discussion below represents my analysis of pertinent findings related to patient's condition, differential diagnosis, treatment plan and final disposition.    My differential diagnosis for syncope includes but is not limited to:  Vasovagal reflex - situational stimulus, micturition, defecation, cough, sneezing, swallowing, postprandial state, react sinus hypersensitivity  Vascular-prolonged recumbency, sudden postural change, prolonged standing, hypovolemia, vasodilator drugs, autonomic neuropathy, adrenal insufficiency, subclavian steal, pulmonary embolism  Cardiac -arrhythmia, heart block, myocardial infarction, aortic stenosis, cardiac myxoma, LV Dysfunction, Aortic Dissection, Pulmonary Hypertension, Pulmonary Stenosis, Pacemaker Failure  CNS-seizure, hypoxia, hypoglycemia, TIA,(basal vertebral), hydrocephalus      ED Course as of 01/25/25 1503   Sat Jan 25, 2025   0937 EKG PROCEDURE    EKG time: 932  Rhythm/Rate: Normal sinus, rate 70  P waves and [place]: Normal P, normal [place]  QRS, axis: Narrow QRS, no axis  ST and T waves: No acute    Independently Interpreted by me  Not significantly changed compared to prior 12/17/2024   [TR]   0945 XR Chest 1 View  My independent interpretation of the imaging study is no dense consolidation [TR]   0949 WBC: 8.55 [DC]   0949 Hemoglobin: 16.1 [DC]   1007 D-Dimer, Quant: <0.27 [TR]   1040 Pt unable to perform orthostatic BP due to extreme lightheadedness upon standing. [DC]   1106 Patient is restless and feels he cannot breathe.  He does appear very anxious but given significant hypertension and medical history will obtain CT angiogram  of the chest to rule out more sinister etiology.  Will give small dose of anxiolytic.  O2 saturations remain normal. [DC]   1300 Discussed case with Dr. Bright [facility], who will admit the patient. [DC]      ED Course User Index  [DC] Marianne Bah PA  [TR] Lemuel Ballesteros MD             AS OF 15:03 EST VITALS:    BP - 146/87  HR - 65  TEMP - 97.5 °F (36.4 °C)  O2 SATS - 97%        DIAGNOSIS  Final diagnoses:   Syncope, unspecified syncope type   Hypertensive urgency   Anxiety         DISPOSITION  ED Disposition       ED Disposition   Decision to Admit    Condition   --    Comment   Level of Care: Telemetry [5]   Diagnosis: Syncope [244246]   Admitting Physician: CARMEN BRIGHT [[id number]]   Attending Physician: CARMEN BRIGHT [[id number]]   Is patient appropriate for Inpatient Observation Unit?: No [0]                    Note Disclaimer: At [facility], we believe that sharing information builds trust and better relationships. You are receiving this note because you recently visited [facility]. It is possible you will see health information before a provider has talked with you about it. This kind of information can be easy to misunderstand. To help you fully understand what it means for your health, we urge you to discuss this note with your provider.         Marianne Bah PA  01/25/25 2141

## 2025-01-25 NOTE — LETTER
[facility]  [facility]  [phone]    ACCESS CENTER STATEMENT OF DISPOSITION        I, Flako Coreas Sr., was assessed in the [facility] at [facility] on 1/28/2025.  I understand the recommendations below and what follow-up action is expected of me.      {Follow-ups:[id number]}                ________________________________  Patient/Parent/Guardian/POA Signature    ________________________________  Clinician Signature    1/28/2025  15:32 EST

## 2025-01-25 NOTE — ED NOTES
Patient to ER via car from home for syncopal episode when getting up out of bed at 2am    Patient reports high BP  Hx of stroke     Patient reports falling and hitting head    No s/s of stroke at time of triage

## 2025-01-25 NOTE — ED PROVIDER NOTES
EMERGENCY DEPARTMENT MD ATTESTATION NOTE    Room Number:  07/07  PCP: Akash Rodriguez Jr., DO  Independent Historians: Patient and Family    HPI:  A complete HPI/ROS/PMH/PSH/SH/FH are unobtainable due to: None    Chronic or social conditions impacting patient care (Social Determinants of Health): None      Context: Flako Coreas Sr. is a 59 y.o. male with a medical history of dyslipidemia, anxiety, vertigo, stroke, alcohol abuse who presents to the ED c/o acute syncopal episode.  The patient reports around 2 AM he was getting up out of bed and he remembers going towards the ground.  He is not certain whether he passed out.  He does have a history of syncopal episodes.  He states he has been diagnosed with syncopal episodes secondary to vasovagal syndrome.  He does report he had a several second pause on EKG with prior syncopal episode.  He denies chest pain.  He does report shortness of breath.  He states his blood pressure was elevated overnight and he took extra Coreg as well as hydralazine without improvement.        Review of prior external notes (non-ED) -and- Review of prior external test results outside of this encounter:  Laboratory evaluation 12/17/2024 shows normal CMP    Prescription drug monitoring program review:           PHYSICAL EXAM    I have reviewed the triage vital signs and nursing notes.    ED Triage Vitals   Temp Heart Rate Resp BP SpO2   01/25/25 0921 01/25/25 0921 01/25/25 0921 01/25/25 0925 01/25/25 0921   97.5 °F (36.4 °C) 105 16 (!) 197/150 98 %      Temp src Heart Rate Source Patient Position BP Location FiO2 (%)   -- 01/25/25 0933 01/25/25 0933 01/25/25 0933 --    Monitor Sitting Right arm        Physical Exam  GENERAL: Awake, alert, appears dyspneic  SKIN: Warm, dry  HENT: Normocephalic, forehead abrasion  EYES: no scleral icterus  CV: regular rhythm, regular rate  RESPIRATORY: Increased effort, lungs clear  ABDOMEN: soft, nontender, nondistended  MUSCULOSKELETAL: no  deformity, no calf tenderness or swelling  NEURO: alert, moves all extremities, follows commands            MEDICATIONS GIVEN IN ER  Medications   labetalol (NORMODYNE,TRANDATE) injection 20 mg (20 mg Intravenous Given 1/25/25 1002)   hydrALAZINE (APRESOLINE) injection 20 mg (20 mg Intravenous Given 1/25/25 1047)   LORazepam (ATIVAN) tablet 0.5 mg (0.5 mg Oral Given 1/25/25 1114)   iopamidol (ISOVUE-370) 76 % injection 100 mL (95 mL Intravenous Given by Other 1/25/25 1141)   hydrOXYzine (ATARAX) tablet 50 mg (50 mg Oral Given 1/25/25 1253)         ORDERS PLACED DURING THIS VISIT:  Orders Placed This Encounter   Procedures    XR Chest 1 View    CT Head Without Contrast    CT Angiogram Chest Pulmonary Embolism    Comprehensive Metabolic Panel    Urinalysis With Microscopic If Indicated (No Culture) - Urine, Clean Catch    High Sensitivity Troponin T    CBC Auto Differential    [place] Draw    BNP    Magnesium    Ethanol    D-dimer, Quantitative    High Sensitivity Troponin T 1Hr    Orthostatic Vitals    Code Status and Medical Interventions: CPR (Attempt to Resuscitate); Full Support    [facility] (on-call MD unless specified) Details    ECG 12 Lead Syncope    Initiate Observation Status    CBC & Differential    Gold Top - SST    Gray Top    Light Blue Top         PROCEDURES  Procedures      Critical care provider statement:    Critical care time (minutes): 35.   Critical care time was exclusive of:  Separately billable procedures and treating other patients   Critical care was necessary to treat or prevent imminent or life-threatening deterioration of the following conditions:  Circulatory Failure   Critical care was time spent personally by me on the following activities:  Development of treatment plan with patient or surrogate, discussions with consultants, evaluation of patient's response to treatment, examination of patient, obtaining history from patient or surrogate, ordering and performing treatments and  interventions, ordering and review of laboratory studies, ordering and review of radiographic studies, pulse oximetry, re-evaluation of patient's condition and review of old charts. Critical Care indicators:        PROGRESS, DATA ANALYSIS, CONSULTS, AND MEDICAL DECISION MAKING  All labs have been independently interpreted by me.  All radiology studies have been reviewed by me. All EKG's have been independently viewed and interpreted by me.  Discussion below represents my analysis of pertinent findings related to patient's condition, differential diagnosis, treatment plan and final disposition.    Differential diagnosis includes but is not limited to hypertensive urgency, hypertensive emergency, acute coronary syndrome, acute aortic syndrome, PE, intracranial hemorrhage, syncope, arrhythmia.    Clinical Scores:                     [place] Syncope Rule Score: 1                 ED Course as of 01/25/25 1411   Sat Jan 25, 2025   0937 EKG PROCEDURE    EKG time: 932  Rhythm/Rate: Normal sinus, rate 70  P waves and IA: Normal P, normal IA  QRS, axis: Narrow QRS, no axis  ST and T waves: No acute    Independently Interpreted by me  Not significantly changed compared to prior 12/17/2024   [TR]   0945 XR Chest 1 View  My independent interpretation of the imaging study is no dense consolidation [TR]   0949 WBC: 8.55 [DC]   0949 Hemoglobin: 16.1 [DC]   1007 D-Dimer, Quant: <0.27 [TR]   1040 Pt unable to perform orthostatic BP due to extreme lightheadedness upon standing. [DC]   1106 Patient is restless and feels he cannot breathe.  He does appear very anxious but given significant hypertension and medical history will obtain CT angiogram of the chest to rule out more sinister etiology.  Will give small dose of anxiolytic.  O2 saturations remain normal. [DC]   1300 Discussed case with Dr. Bright, [facility], who will admit the patient. [DC]      ED Course User Index  [DC] Marianne Bah PA  [TR] Lemuel Ballesteros MD       MDM:  The patient appears dyspneic.  His [place] syncope rule is not low risk.  He will require admission today.  He is hypertensive.  We will aggressively treat her his hypertension.  We will CT his head is over his evidence of head injury with a syncopal episode.  Will obtain cardiac markers.  He recently had a CTA of the chest which was negative for PE.  He has normal oxygenation status.  Plan to obtain dimer to rule out PE.        COMPLEXITY OF CARE  The patient requires admission.    Please note that portions of this document were completed with a voice recognition program.    Note Disclaimer: At [facility], we believe that sharing information builds trust and better relationships. You are receiving this note because you recently visited [facility]. It is possible you will see health information before a provider has talked with you about it. This kind of information can be easy to misunderstand. To help you fully understand what it means for your health, we urge you to discuss this note with your provider.         Lemuel Ballesteros MD  01/25/25 3031

## 2025-01-25 NOTE — H&P
Patient Name:  Flako Coreas Sr.  YOB: 1965  MRN:  [id number]  Admit Date:  1/25/2025  Patient Care Team:  Akash Rodriguez Jr.,  as PCP - General (Sports Medicine)        Chief Complaint   Patient presents with    Syncope   Few hours prior to arrival    History Present Illness     A pleasant 59 years old gentleman with a past history of cerebellar CVA leading to ataxia and frequent falls/alcohol use/GERD/Lepe's/dyslipidemia/intracranial vascular disease/diverticulosis/spleen nodule/hypertension/A-fib/mood disorder who takes Norvasc and lisinopril only for his hypertension but no other medications.  He presents to the emergency department with sudden onset of momentary syncope when getting out of the bed earlier today.  There was no headache.  No double vision.  No loss of the vision.  No slurring of the speech.  No unilateral numbness or weakness.  No seizures.  No urinary or bowel incontinence.  Questionable head trauma.  He took his blood pressure at home and it was very elevated.  He has denied any chest pain/palpitation/cough/wheeze/hemoptysis/ankle edema but does describe a history of dyspnea that is present for the last 1 month.  In the emergency department CT angio of the chest revealed no pulmonary infiltrate or pulmonary embolism.  Chest x-ray with no acute disease.  CT scan of the brain without contrast revealed no acute disease.  UA was negative.  Troponin x 2 were negative.  D-dimer was negative.  CMP was normal.  CBC was normal.  proBNP was normal.  Magnesium was normal at 2.1.  Ethanol level is less than 10.  Patient was subsequently admitted        Review of Systems   Cardiovascular/respiratory.  As above  GI.  Positive vomiting x 1 today with no hematemesis.  Positive heartburn.  No dysphagia or odynophagia.  No abdominal pain.  Normal bowel movements without constipation/diarrhea/bleeding per rectum/melena  .  No dysuria or hematuria or urinary incontinence  CNS.   As above.    Personal History     Past Medical History:   Diagnosis Date    ADHD (attention deficit hyperactivity disorder)     On going issue    Anxiety     Lepe's esophagus     Benign prostatic hyperplasia Surgery in 12/2021    Brain concussion 11/2023    Clotting disorder Intestinal    Depression     Diverticulitis     Diverticulosis     Duodenitis     Enteritis     Failure to thrive (child)     Family history of blood clots     Fracture of wrist 1985    Fracture, foot 2019    GERD (gastroesophageal reflux disease)     Hyperlipidemia     Hypertension     Hypertensive emergency     Irritable bowel syndrome 2017    Low testosterone     Microcytosis     Pancreatitis     Pneumonia     PONV (postoperative nausea and vomiting)     Seasonal affective disorder     Shoulder injury     Stroke     Unexplained weight loss     Visual impairment 8/2023     Past Surgical History:   Procedure Laterality Date    CARDIAC ELECTROPHYSIOLOGY PROCEDURE N/A 01/24/2024    Procedure: Loop insertion- [employer] LINQ;  Surgeon: Kaela Walker MD;  Location: [facility] CATH INVASIVE LOCATION;  Service: Cardiovascular;  Laterality: N/A;    COLON SURGERY      COLONOSCOPY      COLONOSCOPY N/A 12/11/2022    Procedure: COLONOSCOPY INTO CECUM WITH HOT SNARE POLYPECTOMY;  Surgeon: Albert Gallo MD;  Location: [facility] ENDOSCOPY;  Service: Gastroenterology;  Laterality: N/A;  PRE- GI BLEED  POST- DIVERTICULOSIS, POLYP    COLONOSCOPY N/A 02/14/2024    Procedure: COLONOSCOPY into cecum/terminal ileum;  Surgeon: Albert Gallo MD;  Location: [facility] ENDOS[facility];  Service: Gastroenterology;  Laterality: N/A;  diverticulosis, hemorrhoids    ENDOSCOPY N/A 12/10/2022    Procedure: ESOPHAGOGASTRODUODENOSCOPY;  Surgeon: Albert Gallo MD;  Location: [facility] ENDOS[facility];  Service: Gastroenterology;  Laterality: N/A;  PRE- DARK STOOLS  POST- BARRETTS ESOPHAGUS    ENDOSCOPY N/A 02/14/2024    Procedure: ESOPHAGOGASTRODUODENOSCOPY with biopsy;  Surgeon: Sabino  Albert SOUZA MD;  Location: [facility] ENDOSCOPY;  Service: Gastroenterology;  Laterality: N/A;  long segment wilson's, hiatal hernia    FRACTURE SURGERY Right 1980    for broken arm    FRACTURE SURGERY Right     for broken hand    HAND SURGERY  1990    INCISION AND DRAINAGE ABSCESS  2015    anal    PROSTATE SURGERY      SHOULDER SURGERY  2022    SMALL INTESTINE SURGERY      TONSILLECTOMY      TRIGGER POINT INJECTION  2017     Family History   Problem Relation Age of Onset    Stroke Mother     Heart disease Mother     Stroke Father     Hyperlipidemia Father     Hypertension Father      Social History     Tobacco Use    Smoking status: Never    Smokeless tobacco: Never   Vaping Use    Vaping status: Never Used   Substance Use Topics    Alcohol use: Not Currently     Comment: 12 beers on the weekend    Drug use: Not Currently     Frequency: 1.0 times per week     Types: Marijuana     Comment: patient states he has been smoking marijuania daily x 2 weeks     No current facility-administered medications on file prior to encounter.     Current Outpatient Medications on File Prior to Encounter   Medication Sig Dispense Refill    amLODIPine (NORVASC) 10 MG tablet Take 1 tablet by mouth Daily.      carvedilol (COREG) 3.125 MG tablet Take 1 tablet by mouth 2 (Two) Times a Day With Meals. Pt unsure of dose      hydrALAZINE (APRESOLINE) 25 MG tablet Take 1 tablet by mouth 3 (Three) Times a Day.      lisinopril (PRINIVIL,ZESTRIL) 40 MG tablet Take 1 tablet by mouth Daily.      apixaban (ELIQUIS) 5 MG tablet tablet Take 1 tablet by mouth 2 (Two) Times a Day. (Patient not taking: Reported on 7/3/2024) 60 tablet 11    ASPIRIN 81 PO Take  by mouth.      lisinopril (PRINIVIL,ZESTRIL) 40 MG tablet Take 1 tablet by mouth Daily for 30 doses. 30 tablet 0    thiamine (VITAMIN B1) 100 MG tablet Take 1 tablet by mouth Daily. (Patient not taking: Reported on 7/3/2024) 90 tablet 0    vilazodone (Viibryd) 40 MG tablet tablet Take 1 tablet by mouth  Daily. 90 tablet 1    vitamin B-12 (CYANOCOBALAMIN) 500 MCG tablet Take 1 tablet by mouth Daily. (Patient not taking: Reported on 7/3/2024) 90 tablet 0     No Known Allergies    Objective    Objective     Vital Signs  Temp:  [97.5 °F (36.4 °C)] 97.5 °F (36.4 °C)  Heart Rate:  [] 73  Resp:  [16-20] 20  BP: (148-210)/() 153/101  SpO2:  [95 %-99 %] 98 %  on   ;   Device (Oxygen Therapy): room air  Body mass index is 28.89 kg/m².    Physical Exam  General.  Middle-aged gentleman.  Alert and oriented x 4.  In no apparent pain/distress/diaphoresis.  Anxious.  HEENT examination.  The bruising over the nasal bridge.  Pupils equal round and reactive.  Intact extraocular musculature.  No pallor or jaundice.  Oral cavity.  Moist mucous membrane  Neck.  Supple.  No JVD.  No carotid bruit.  No lymphadenopathy or thyromegaly  Cardiovascular.  Regular rate and rhythm and grade 2 systolic murmur  Chest.  Clear to auscultation bilaterally with no added sounds.  Abdomen.  Soft lax.  No tenderness.  No organomegaly.  No guarding or rebound  Extremities.  No clubbing/cyanosis/edema.  CNS.  No acute focal neurological deficits.      Results Review:  I reviewed the patient's new clinical results.  I reviewed the patient's new imaging results and agree with the interpretation.  I reviewed the patient's other test results and agree with the interpretation  I personally viewed and interpreted the patient's EKG/Telemetry data  Discussed with ED provider.    Lab Results (last 24 hours)       Procedure Component Value Units Date/Time    CBC & Differential [[id number]]  (Abnormal) Collected: 01/25/25 0928    Specimen: Blood Updated: 01/25/25 0942    Narrative:      The following orders were created for panel order CBC & Differential.  Procedure                               Abnormality         Status                     ---------                               -----------         ------                     CBC Auto  Differential[[id number]]        Abnormal            Final result                 Please view results for these tests on the individual orders.    Comprehensive Metabolic Panel [[id number]] Collected: 01/25/25 0928    Specimen: Blood Updated: 01/25/25 1004     Glucose 95 mg/dL      BUN 12 mg/dL      Creatinine 0.82 mg/dL      Sodium 140 mmol/L      Potassium 4.1 mmol/L      Comment: Slight hemolysis detected by analyzer. Result may be falsely elevated.        Chloride 103 mmol/L      CO2 23.4 mmol/L      Calcium 9.0 mg/dL      Total Protein 7.6 g/dL      Albumin 4.3 g/dL      ALT (SGPT) 31 U/L      AST (SGOT) 22 U/L      Alkaline Phosphatase 72 U/L      Total Bilirubin 0.5 mg/dL      Globulin 3.3 gm/dL      A/G Ratio 1.3 g/dL      BUN/Creatinine Ratio 14.6     Anion Gap 13.6 mmol/L      eGFR 102.5 mL/min/1.73     Narrative:      GFR Categories in Chronic Kidney Disease (CKD)      GFR Category          GFR (mL/min/1.73)    Interpretation  G1                     90 or greater         Normal or high (1)  G2                      60-89                Mild decrease (1)  G3a                   45-59                Mild to moderate decrease  G3b                   30-44                Moderate to severe decrease  G4                    15-29                Severe decrease  G5                    14 or less           Kidney failure          (1)In the absence of evidence of kidney disease, neither GFR category G1 or G2 fulfill the criteria for CKD.    eGFR calculation 2021 CKD-EPI creatinine equation, which does not include race as a factor    High Sensitivity Troponin T [[id number]]  (Normal) Collected: 01/25/25 0928    Specimen: Blood Updated: 01/25/25 1004     HS Troponin T <6 ng/L     Narrative:      High Sensitive Troponin T Reference Range:  <14.0 ng/L- Negative Female for AMI  <22.0 ng/L- Negative Male for AMI  >=14 - Abnormal Female indicating possible myocardial injury.  >=22 - Abnormal Male indicating possible myocardial  injury.   Clinicians would have to utilize clinical acumen, EKG, Troponin, and serial changes to determine if it is an Acute Myocardial Infarction or myocardial injury due to an underlying chronic condition.         CBC Auto Differential [[id number]]  (Abnormal) Collected: 01/25/25 0928    Specimen: Blood Updated: 01/25/25 0942     WBC 8.55 10*3/mm3      RBC 5.62 10*6/mm3      Hemoglobin 16.1 g/dL      Hematocrit 48.3 %      MCV 85.9 fL      MCH 28.6 pg      MCHC 33.3 g/dL      RDW 13.6 %      RDW-SD 42.2 fl      MPV 9.6 fL      Platelets 273 10*3/mm3      Neutrophil % 62.9 %      Lymphocyte % 22.0 %      Monocyte % 7.1 %      Eosinophil % 7.3 %      Basophil % 0.5 %      Immature Grans % 0.2 %      Neutrophils, Absolute 5.38 10*3/mm3      Lymphocytes, Absolute 1.88 10*3/mm3      Monocytes, Absolute 0.61 10*3/mm3      Eosinophils, Absolute 0.62 10*3/mm3      Basophils, Absolute 0.04 10*3/mm3      Immature Grans, Absolute 0.02 10*3/mm3      nRBC 0.0 /100 WBC     BNP [[id number]]  (Normal) Collected: 01/25/25 0928    Specimen: Blood Updated: 01/25/25 1004     proBNP 50.4 pg/mL     Narrative:      This assay is used as an aid in the diagnosis of individuals suspected of having heart failure. It can be used as an aid in the diagnosis of acute decompensated heart failure (ADHF) in patients presenting with signs and symptoms of ADHF to the emergency department (ED). In addition, NT-proBNP of <300 pg/mL indicates ADHF is not likely.    Age Range Result Interpretation  NT-proBNP Concentration (pg/mL:      <50             Positive            >450                   Gray                 300-450                    Negative             <300    50-75           Positive            >900                  Gray                300-900                  Negative            <300      >75             Positive            >1800                  Gray                300-1800                  Negative            <300    Magnesium [[id number]]   "(Normal) Collected: 01/25/25 0928    Specimen: Blood Updated: 01/25/25 1004     Magnesium 2.1 mg/dL     Ethanol [[id number]] Collected: 01/25/25 0928    Specimen: Blood Updated: 01/25/25 1016     Ethanol <10 mg/dL      Ethanol % <0.010 %     D-dimer, Quantitative [[id number]]  (Normal) Collected: 01/25/25 0931    Specimen: Blood Updated: 01/25/25 1006     D-Dimer, Quantitative <0.27 MCGFEU/mL     Narrative:      According to the assay 's published package insert, a normal (<0.50 MCGFEU/mL) D-dimer result in conjunction with a non-high clinical probability assessment, excludes deep vein thrombosis (DVT) and pulmonary embolism (PE) with high sensitivity.    D-dimer values increase with age and this can make VTE exclusion of an older population difficult. To address this, the [employer], based on best available evidence and recent guidelines, recommends that clinicians use age-adjusted D-dimer thresholds in patients greater than 50 years of age with: a) a low probability of PE who do not meet all Pulmonary Embolism Rule Out Criteria, or b) in those with intermediate probability of PE.   The formula for an age-adjusted D-dimer cut-off is \"age/100\".  For example, a 60 year old patient would have an age-adjusted cut-off of 0.60 MCGFEU/mL and an 80 year old 0.80 MCGFEU/mL.    High Sensitivity Troponin T 1Hr [[id number]] Collected: 01/25/25 1034    Specimen: Blood Updated: 01/25/25 1103     HS Troponin T <6 ng/L      Troponin T Numeric Delta --     Comment: Unable to calculate.       Narrative:      High Sensitive Troponin T Reference Range:  <14.0 ng/L- Negative Female for AMI  <22.0 ng/L- Negative Male for AMI  >=14 - Abnormal Female indicating possible myocardial injury.  >=22 - Abnormal Male indicating possible myocardial injury.   Clinicians would have to utilize clinical acumen, EKG, Troponin, and serial changes to determine if it is an Acute Myocardial Infarction or myocardial injury due " to an underlying chronic condition.         Urinalysis With Microscopic If Indicated (No Culture) - Urine, Clean Catch [[id number]]  (Normal) Collected: 01/25/25 1042    Specimen: Urine, Clean Catch Updated: 01/25/25 1052     Color, UA Yellow     Appearance, UA Clear     pH, UA 7.5     Specific Gravity, UA 1.018     Glucose, UA Negative     Ketones, UA Negative     Bilirubin, UA Negative     Blood, UA Negative     Protein, UA Negative     Leuk Esterase, UA Negative     Nitrite, UA Negative     Urobilinogen, UA 0.2 E.U./dL    Narrative:      Urine microscopic not indicated.            Imaging Results (Last 24 Hours)       Procedure Component Value Units Date/Time    CT Angiogram Chest Pulmonary Embolism [[id number]] Collected: 01/25/25 1331     Updated: 01/25/25 1332    Narrative:      CT ANGIOGRAM OF THE CHEST WITH CONTRAST INCLUDING RECONSTRUCTION IMAGES  01/25/2025     HISTORY: Shortness of breath. Possible pulmonary embolus.     Following the intravenous contrast injection, CT angiography was  performed through the chest. Sagittal, coronal and 3D reconstruction  images were reviewed.     The pulmonary arterial system is well opacified with no evidence of  pulmonary embolus. No pathologically enlarged hilar or mediastinal lymph  nodes are seen. There is some coronary calcification.     No lung masses or significant pulmonary infiltrates are seen.       Impression:      No evidence of pulmonary embolus.     Radiation dose reduction techniques were utilized, including automated  exposure control and exposure modulation based on body size.          CT Head Without Contrast [[id number]] Collected: 01/25/25 1202     Updated: 01/25/25 1202    Narrative:      CT OF THE BRAIN WITHOUT CONTRAST 01/25/2025     HISTORY: Syncope. Head injury.     Axial images were obtained through the brain without intravenous  contrast.     FINDINGS: There is mild diffuse atrophy. There is some ossification  along the anterior falx. There is no  evidence of acute infarction,  hemorrhage, midline shift or mass effect.     No bony abnormalities are seen.       Impression:      No acute process.           Radiation dose reduction techniques were utilized, including automated  exposure control and exposure modulation based on body size.          XR Chest 1 View [[id number]] Collected: 01/25/25 0957     Updated: 01/25/25 1000    Narrative:      XR CHEST 1 VW-1/25/2025     HISTORY: Syncope.     The heart size is within normal limits. Lungs appear free of acute  infiltrates. Probable loop recorder overlies the left hemithorax. Bones  and soft tissues are otherwise unremarkable.       Impression:      1. No acute process.        This report was finalized on 1/25/2025 9:57 AM by Dr. Salvador Arita M.D on Workstation: [id number]               Results for orders placed during the hospital encounter of 08/19/23    Adult Transesophageal Echo ([facility]ANN) W/ Cont if Necessary Per Protocol    Interpretation Summary    Left ventricular systolic function is hyperdynamic (EF > 70%).    Left ventricular wall thickness is consistent with mild to moderate concentric hypertrophy.    Left ventricular diastolic function is consistent with (grade I) impaired relaxation.    Normal right ventricular cavity size and systolic function noted.    The left atrial cavity is mildly dilated.    No evidence of a left atrial appendage thrombus was present    No evidence of a patent foramen ovale. Saline test results are negative.    There are very mild plaques in the distal descending thoracic aorta. There are no plaques in the aortic arch or ascending aorta    There is no evidence of pericardial effusion      ECG 12 Lead Syncope   Preliminary Result   HEART RATE=70  bpm   RR Xsjnlsea=007  ms   AK Bxxfrplg=798  ms   P Horizontal Axis=-32  deg   P Front Axis=-47  deg   QRSD Interval=91  ms   QT Ozwyhrhk=102  ms   QYlR=552  ms   QRS Axis=66  deg   T Wave Axis=3  deg   - BORDERLINE ECG -   Sinus or  ectopic atrial rhythm   Borderline T abnormalities, inferior leads   Baseline wander in lead(s) V1   Date and Time of Study:2025-01-25 09:32:07               Assessment/Plan     Active Hospital Problems    Diagnosis  POA    **Syncope [R55]  Yes    Hypertensive emergency [I16.1]  Yes    History of atrial fibrillation [Z86.79]  Not Applicable    Alcohol use [Z78.9]  Yes    Mood disorder [F39]  Yes    History of CVA (cerebrovascular accident) [Z86.73]  Not Applicable    Lepe's esophagus [K22.70]  Yes    GERD without esophagitis [K21.9]  Yes    Diverticulosis [K57.90]  Yes    Hyperlipidemia [E78.5]  Yes      Resolved Hospital Problems   No resolved problems to display.           Hypertensive emergency leading to syncope in a patient with a history of atrial fibrillation and hypertension and cerebellar CVA leading to ataxia and intracranial vascular disease.  Differential diagnoses include CVA/TIA/hypertensive emergency.  CNS examination without focal deficits.  CT scan of the brain without contrast is negative.  Patient received IV hydralazine in the emergency department.  EKG with ectopic atrial rhythm.  Patient last echo on 8/20/2023 revealing a normal ejection fraction with grade 1 diastolic dysfunction and left ventricular hypertrophy with trace AR and MR.  Troponins are negative.  Plan neurochecks/check MRI of the brain/check CTA of the head and the neck/check 2D echo/monitor troponin/initiate IV as needed hydralazine.  Will continue the patient lisinopril and Norvasc and add beta-blockers.  Start aspirin and Lipitor.  Consult cardiology.  Patient is considered to be a high risk for anticoagulation because of his recurrent fall because of the ataxia.  Alcohol use.  Patient consumes 2-3 beers per day.  Not in active withdrawal.  Plan CIWA protocol and detoxification.  GERD/Lepe's/diverticular disease.  Uncontrolled GERD.  Plan IV Protonix.  Benign GI examination.  Dyslipidemia.  Will check lipids and initiate  Lipitor and aspirin.  Mood disorder.  Benzos.  VTE prophylaxis.  Sequential compression devices.      Discussed my findings and plan of treatment with the patient/family/ER provider.  Code Status -discussed with the patient and he is a full code.       Ester Bright MD  [facility]  01/25/25  13:47 EST

## 2025-01-25 NOTE — LETTER
[facility]  [phone]    ACCESS CENTER STATEMENT OF DISPOSITION        I, Flako Coreas Sr., was assessed in the [facility] at [facility] on 2/1/2025.  I understand the recommendations below and what follow-up action is expected of me.      [facility]   [address] [address]  [phone]    [facility] (Patient Experience)  [phone]    [facility]  [phone]    [facility]  [phone]    [facility]  [phone]        ________________________________  Clinician Signature    2/1/2025  10:00 EST

## 2025-01-25 NOTE — ED NOTES
.Nursing report ED to floor  Flako Coreas Sr.  59 y.o.  male    HPI :  HPI  Stated Reason for Visit: syncope    Chief Complaint  Chief Complaint   Patient presents with    Syncope       Admitting doctor:   Ester Bright MD    Admitting diagnosis:   The primary encounter diagnosis was Syncope, unspecified syncope type. Diagnoses of Hypertensive urgency and Anxiety were also pertinent to this visit.    Code status:   Current Code Status       Date Active Code Status Order ID Comments User Context       Prior            Allergies:   Patient has no known allergies.    Isolation:   No active isolations    Intake and Output  No intake or output data in the 24 hours ending 01/25/25 1308    Weight:       01/25/25  0933   Weight: 102 kg (225 lb)       Most recent vitals:   Vitals:    01/25/25 1149 01/25/25 1150 01/25/25 1156 01/25/25 1226   BP:  (!) 148/112 157/89 (!) 153/101   BP Location:  Right arm Right arm Right arm   Patient Position:  Sitting Sitting Sitting   Pulse: 79 78 76 73   Resp:  20 20 20   Temp:       SpO2: 97% 98% 98% 98%   Weight:       Height:           Active LDAs/IV Access:   Lines, Drains & Airways       Active LDAs       Name Placement date Placement time Site Days    Peripheral IV 01/25/25 0930 Left Antecubital 01/25/25  0930  Antecubital  less than 1                    Labs (abnormal labs have a star):   Labs Reviewed   CBC WITH AUTO DIFFERENTIAL - Abnormal; Notable for the following components:       Result Value    Eosinophil % 7.3 (*)     Eosinophils, Absolute 0.62 (*)     All other components within normal limits   URINALYSIS W/ MICROSCOPIC IF INDICATED (NO CULTURE) - Normal    Narrative:     Urine microscopic not indicated.   TROPONIN - Normal    Narrative:     High Sensitive Troponin T Reference Range:  <14.0 ng/L- Negative Female for AMI  <22.0 ng/L- Negative Male for AMI  >=14 - Abnormal Female indicating possible myocardial injury.  >=22 - Abnormal Male indicating possible myocardial  "injury.   Clinicians would have to utilize clinical acumen, EKG, Troponin, and serial changes to determine if it is an Acute Myocardial Infarction or myocardial injury due to an underlying chronic condition.        BNP (IN-HOUSE) - Normal    Narrative:     This assay is used as an aid in the diagnosis of individuals suspected of having heart failure. It can be used as an aid in the diagnosis of acute decompensated heart failure (ADHF) in patients presenting with signs and symptoms of ADHF to the emergency department (ED). In addition, NT-proBNP of <300 pg/mL indicates ADHF is not likely.    Age Range Result Interpretation  NT-proBNP Concentration (pg/mL:      <50             Positive            >450                   Gray                 300-450                    Negative             <300    50-75           Positive            >900                  Gray                300-900                  Negative            <300      >75             Positive            >1800                  Gray                300-1800                  Negative            <300   MAGNESIUM - Normal   D-DIMER, QUANTITATIVE - Normal    Narrative:     According to the assay 's published package insert, a normal (<0.50 MCGFEU/mL) D-dimer result in conjunction with a non-high clinical probability assessment, excludes deep vein thrombosis (DVT) and pulmonary embolism (PE) with high sensitivity.    D-dimer values increase with age and this can make VTE exclusion of an older population difficult. To address this, the American College [employer], based on best available evidence and recent guidelines, recommends that clinicians use age-adjusted D-dimer thresholds in patients greater than 50 years of age with: a) a low probability of PE who do not meet all Pulmonary Embolism Rule Out Criteria, or b) in those with intermediate probability of PE.   The formula for an age-adjusted D-dimer cut-off is \"age/100\".  For example, a 60 year old " patient would have an age-adjusted cut-off of 0.60 MCGFEU/mL and an 80 year old 0.80 MCGFEU/mL.   COMPREHENSIVE METABOLIC PANEL    Narrative:     GFR Categories in Chronic Kidney Disease (CKD)      GFR Category          GFR (mL/min/1.73)    Interpretation  G1                     90 or greater         Normal or high (1)  G2                      60-89                Mild decrease (1)  G3a                   45-59                Mild to moderate decrease  G3b                   30-44                Moderate to severe decrease  G4                    15-29                Severe decrease  G5                    14 or less           Kidney failure          (1)In the absence of evidence of kidney disease, neither GFR category G1 or G2 fulfill the criteria for CKD.    eGFR calculation 2021 CKD-EPI creatinine equation, which does not include race as a factor   RAINBOW DRAW    Narrative:     The following orders were created for panel order [place] Draw.  Procedure                               Abnormality         Status                     ---------                               -----------         ------                     Gold Top - SST[[id number]]                                   Final result               Damon Top[[id number]]                                         Final result               Light Blue Top[[id number]]                                   Final result                 Please view results for these tests on the individual orders.   ETHANOL   HIGH SENSITIVITIY TROPONIN T 1HR    Narrative:     High Sensitive Troponin T Reference Range:  <14.0 ng/L- Negative Female for AMI  <22.0 ng/L- Negative Male for AMI  >=14 - Abnormal Female indicating possible myocardial injury.  >=22 - Abnormal Male indicating possible myocardial injury.   Clinicians would have to utilize clinical acumen, EKG, Troponin, and serial changes to determine if it is an Acute Myocardial Infarction or myocardial injury due to an underlying chronic  condition.        CBC AND DIFFERENTIAL    Narrative:     The following orders were created for panel order CBC & Differential.  Procedure                               Abnormality         Status                     ---------                               -----------         ------                     CBC Auto Differential[[id number]]        Abnormal            Final result                 Please view results for these tests on the individual orders.   [facility]   GRAY TOP   LIGHT BLUE TOP       EKG:   ECG 12 Lead Syncope   Preliminary Result   HEART RATE=70  bpm   RR Upzceean=432  ms   SD Qfrjtdib=928  ms   P Horizontal Axis=-32  deg   P Front Axis=-47  deg   QRSD Interval=91  ms   QT Kzbbhczo=746  ms   FCpQ=870  ms   QRS Axis=66  deg   T Wave Axis=3  deg   - BORDERLINE ECG -   Sinus or ectopic atrial rhythm   Borderline T abnormalities, inferior leads   Baseline wander in lead(s) V1   Date and Time of Study:2025-01-25 09:32:07          Meds given in ED:   Medications   labetalol (NORMODYNE,TRANDATE) injection 20 mg (20 mg Intravenous Given 1/25/25 1002)   hydrALAZINE (APRESOLINE) injection 20 mg (20 mg Intravenous Given 1/25/25 1047)   LORazepam (ATIVAN) tablet 0.5 mg (0.5 mg Oral Given 1/25/25 1114)   iopamidol (ISOVUE-370) 76 % injection 100 mL (95 mL Intravenous Given by Other 1/25/25 1141)   hydrOXYzine (ATARAX) tablet 50 mg (50 mg Oral Given 1/25/25 1253)       Imaging results:  CT Head Without Contrast    Result Date: 1/25/2025  No acute process.    Radiation dose reduction techniques were utilized, including automated exposure control and exposure modulation based on body size.       XR Chest 1 View    Result Date: 1/25/2025  1. No acute process.   This report was finalized on 1/25/2025 9:57 AM by Dr. Salvador Arita M.D on Workstation: [id number]       Ambulatory status:   - ad chinyere    Social issues:   Social History     Socioeconomic History    Marital status: Single   Tobacco Use    Smoking status:  Never    Smokeless tobacco: Never   Vaping Use    Vaping status: Never Used   Substance and Sexual Activity    Alcohol use: Not Currently     Comment: 12 beers on the weekend    Drug use: Not Currently     Frequency: 1.0 times per week     Types: Marijuana     Comment: patient states he has been smoking marijuania daily x 2 weeks    Sexual activity: Not Currently     Partners: Female     Birth control/protection: Other, Birth control pill       Peripheral Neurovascular  Peripheral Neurovascular (Adult)  Peripheral Neurovascular WDL: WDL, pulse assessment  Pulse Assessment: radial    Neuro Cognitive  Neuro Cognitive (Adult)  Cognitive/Neuro/Behavioral WDL: WDL, arousability, level of consciousness, orientation, mood/behavior  Level of Consciousness: Alert  Arousal Level: opens eyes spontaneously  Orientation: oriented x 4  Mood/Behavior: anxious  Pupils  Pupil PERRLA: yes    Learning  Learning Assessment  Learning Readiness and Ability: no barriers identified  Education Provided  Person Taught: patient, family member/friend  Teaching Method: verbal instruction  Teaching Focus: symptom/problem overview    Respiratory  Respiratory WDL  Respiratory WDL: .WDL except, all  Rhythm/Pattern, Respiratory: shortness of breath (pt feels like he can't catch his breath)    Abdominal Pain       Pain Assessments  Pain (Adult)  (0-10) Pain Rating: Rest: 7    NIH Stroke Scale       Gloria Andrade RN  01/25/25 13:08 EST

## 2025-01-26 ENCOUNTER — APPOINTMENT (OUTPATIENT)
Dept: GENERAL RADIOLOGY | Facility: HOSPITAL | Age: 60
DRG: 312 | End: 2025-01-26

## 2025-01-26 ENCOUNTER — APPOINTMENT (OUTPATIENT)
Dept: CARDIOLOGY | Facility: HOSPITAL | Age: 60
DRG: 312 | End: 2025-01-26

## 2025-01-26 LAB
ANION GAP SERPL CALCULATED.3IONS-SCNC: 10 MMOL/L (ref 5–15)
AORTIC DIMENSIONLESS INDEX: 0.8 (DI)
AV MEAN PRESS GRAD SYS DOP V1V2: 7 MMHG
AV VMAX SYS DOP: 179 CM/SEC
BASOPHILS # BLD AUTO: 0.03 10*3/MM3 (ref 0–0.2)
BASOPHILS NFR BLD AUTO: 0.5 % (ref 0–1.5)
BH CV ECHO MEAS - AO MAX PG: 12.8 MMHG
BH CV ECHO MEAS - AO ROOT DIAM: 3.2 CM
BH CV ECHO MEAS - AO V2 VTI: 40 CM
BH CV ECHO MEAS - AVA(I,D): 2.6 CM2
BH CV ECHO MEAS - EDV(CUBED): 132.7 ML
BH CV ECHO MEAS - EDV(MOD-SP2): 93 ML
BH CV ECHO MEAS - EDV(MOD-SP4): 95 ML
BH CV ECHO MEAS - EF(MOD-SP2): 61.3 %
BH CV ECHO MEAS - EF(MOD-SP4): 62.1 %
BH CV ECHO MEAS - ESV(CUBED): 30.7 ML
BH CV ECHO MEAS - ESV(MOD-SP2): 36 ML
BH CV ECHO MEAS - ESV(MOD-SP4): 36 ML
BH CV ECHO MEAS - FS: 38.6 %
BH CV ECHO MEAS - IVS/LVPW: 0.9 CM
BH CV ECHO MEAS - IVSD: 0.9 CM
BH CV ECHO MEAS - LAT PEAK E' VEL: 9 CM/SEC
BH CV ECHO MEAS - LV DIASTOLIC VOL/BSA (35-75): 41.6 CM2
BH CV ECHO MEAS - LV MASS(C)D: 175.6 GRAMS
BH CV ECHO MEAS - LV MAX PG: 9.9 MMHG
BH CV ECHO MEAS - LV MEAN PG: 5 MMHG
BH CV ECHO MEAS - LV SYSTOLIC VOL/BSA (12-30): 15.8 CM2
BH CV ECHO MEAS - LV V1 MAX: 157 CM/SEC
BH CV ECHO MEAS - LV V1 VTI: 34.3 CM
BH CV ECHO MEAS - LVIDD: 5.1 CM
BH CV ECHO MEAS - LVIDS: 3.1 CM
BH CV ECHO MEAS - LVOT AREA: 3.1 CM2
BH CV ECHO MEAS - LVOT DIAM: 1.97 CM
BH CV ECHO MEAS - LVPWD: 1 CM
BH CV ECHO MEAS - MED PEAK E' VEL: 6.6 CM/SEC
BH CV ECHO MEAS - MV A DUR: 0.09 SEC
BH CV ECHO MEAS - MV A MAX VEL: 109.6 CM/SEC
BH CV ECHO MEAS - MV DEC SLOPE: 486.2 CM/SEC2
BH CV ECHO MEAS - MV DEC TIME: 151 SEC
BH CV ECHO MEAS - MV E MAX VEL: 95.4 CM/SEC
BH CV ECHO MEAS - MV E/A: 0.87
BH CV ECHO MEAS - MV MAX PG: 5 MMHG
BH CV ECHO MEAS - MV MEAN PG: 1.92 MMHG
BH CV ECHO MEAS - MV P1/2T: 60.3 MSEC
BH CV ECHO MEAS - MV V2 VTI: 35.5 CM
BH CV ECHO MEAS - MVA(P1/2T): 3.6 CM2
BH CV ECHO MEAS - MVA(VTI): 3 CM2
BH CV ECHO MEAS - PA ACC TIME: 0.07 SEC
BH CV ECHO MEAS - PA V2 MAX: 103.2 CM/SEC
BH CV ECHO MEAS - RAP SYSTOLE: 3 MMHG
BH CV ECHO MEAS - RV MAX PG: 2.18 MMHG
BH CV ECHO MEAS - RV V1 MAX: 73.8 CM/SEC
BH CV ECHO MEAS - RV V1 VTI: 19.3 CM
BH CV ECHO MEAS - RVSP: 22.6 MMHG
BH CV ECHO MEAS - SV(LVOT): 104.9 ML
BH CV ECHO MEAS - SV(MOD-SP2): 57 ML
BH CV ECHO MEAS - SV(MOD-SP4): 59 ML
BH CV ECHO MEAS - SVI(LVOT): 45.9 ML/M2
BH CV ECHO MEAS - SVI(MOD-SP2): 24.9 ML/M2
BH CV ECHO MEAS - SVI(MOD-SP4): 25.8 ML/M2
BH CV ECHO MEAS - TAPSE (>1.6): 2.8 CM
BH CV ECHO MEAS - TR MAX PG: 19.6 MMHG
BH CV ECHO MEAS - TR MAX VEL: 221.3 CM/SEC
BH CV ECHO MEASUREMENTS AVERAGE E/E' RATIO: 12.23
BH CV XLRA - RV BASE: 3.5 CM
BH CV XLRA - TDI S': 10.6 CM/SEC
BUN SERPL-MCNC: 14 MG/DL (ref 6–20)
BUN/CREAT SERPL: 15.2 (ref 7–25)
CALCIUM SPEC-SCNC: 8.4 MG/DL (ref 8.6–10.5)
CHLORIDE SERPL-SCNC: 106 MMOL/L (ref 98–107)
CHOLEST SERPL-MCNC: 163 MG/DL (ref 0–200)
CO2 SERPL-SCNC: 23 MMOL/L (ref 22–29)
CREAT SERPL-MCNC: 0.92 MG/DL (ref 0.76–1.27)
DEPRECATED RDW RBC AUTO: 41.6 FL (ref 37–54)
EGFRCR SERPLBLD CKD-EPI 2021: 97 ML/MIN/1.73
EOSINOPHIL # BLD AUTO: 0.5 10*3/MM3 (ref 0–0.4)
EOSINOPHIL NFR BLD AUTO: 8.3 % (ref 0.3–6.2)
ERYTHROCYTE [DISTWIDTH] IN BLOOD BY AUTOMATED COUNT: 13.5 % (ref 12.3–15.4)
GLUCOSE BLDC GLUCOMTR-MCNC: 125 MG/DL (ref 70–130)
GLUCOSE SERPL-MCNC: 119 MG/DL (ref 65–99)
HCT VFR BLD AUTO: 43.9 % (ref 37.5–51)
HDLC SERPL-MCNC: 33 MG/DL (ref 40–60)
HGB BLD-MCNC: 14.7 G/DL (ref 13–17.7)
IMM GRANULOCYTES # BLD AUTO: 0.02 10*3/MM3 (ref 0–0.05)
IMM GRANULOCYTES NFR BLD AUTO: 0.3 % (ref 0–0.5)
LDLC SERPL CALC-MCNC: 103 MG/DL (ref 0–100)
LDLC/HDLC SERPL: 3.01 {RATIO}
LEFT ATRIUM VOLUME INDEX: 17.5 ML/M2
LV EF BIPLANE MOD: 60.7 %
LYMPHOCYTES # BLD AUTO: 1.71 10*3/MM3 (ref 0.7–3.1)
LYMPHOCYTES NFR BLD AUTO: 28.2 % (ref 19.6–45.3)
MCH RBC QN AUTO: 28.7 PG (ref 26.6–33)
MCHC RBC AUTO-ENTMCNC: 33.5 G/DL (ref 31.5–35.7)
MCV RBC AUTO: 85.6 FL (ref 79–97)
MONOCYTES # BLD AUTO: 0.44 10*3/MM3 (ref 0.1–0.9)
MONOCYTES NFR BLD AUTO: 7.3 % (ref 5–12)
NEUTROPHILS NFR BLD AUTO: 3.36 10*3/MM3 (ref 1.7–7)
NEUTROPHILS NFR BLD AUTO: 55.4 % (ref 42.7–76)
NRBC BLD AUTO-RTO: 0 /100 WBC (ref 0–0.2)
PLATELET # BLD AUTO: 223 10*3/MM3 (ref 140–450)
PMV BLD AUTO: 9.8 FL (ref 6–12)
POTASSIUM SERPL-SCNC: 3.9 MMOL/L (ref 3.5–5.2)
QT INTERVAL: 408 MS
QTC INTERVAL: 441 MS
RBC # BLD AUTO: 5.13 10*6/MM3 (ref 4.14–5.8)
SODIUM SERPL-SCNC: 139 MMOL/L (ref 136–145)
TRIGL SERPL-MCNC: 154 MG/DL (ref 0–150)
TROPONIN T SERPL HS-MCNC: <6 NG/L
VLDLC SERPL-MCNC: 27 MG/DL (ref 5–40)
WBC NRBC COR # BLD AUTO: 6.06 10*3/MM3 (ref 3.4–10.8)

## 2025-01-26 PROCEDURE — 25010000002 THIAMINE PER 100 MG: Performed by: INTERNAL MEDICINE

## 2025-01-26 PROCEDURE — 25010000002 DIAZEPAM PER 5 MG: Performed by: INTERNAL MEDICINE

## 2025-01-26 PROCEDURE — 84484 ASSAY OF TROPONIN QUANT: CPT | Performed by: INTERNAL MEDICINE

## 2025-01-26 PROCEDURE — 25810000003 SODIUM CHLORIDE 0.9 % SOLUTION: Performed by: INTERNAL MEDICINE

## 2025-01-26 PROCEDURE — 85025 COMPLETE CBC W/AUTO DIFF WBC: CPT | Performed by: INTERNAL MEDICINE

## 2025-01-26 PROCEDURE — 93306 TTE W/DOPPLER COMPLETE: CPT | Performed by: INTERNAL MEDICINE

## 2025-01-26 PROCEDURE — 82948 REAGENT STRIP/BLOOD GLUCOSE: CPT

## 2025-01-26 PROCEDURE — 80048 BASIC METABOLIC PNL TOTAL CA: CPT | Performed by: INTERNAL MEDICINE

## 2025-01-26 PROCEDURE — 25010000002 MIDAZOLAM PER 1 MG: Performed by: INTERNAL MEDICINE

## 2025-01-26 PROCEDURE — 99254 IP/OBS CNSLTJ NEW/EST MOD 60: CPT

## 2025-01-26 PROCEDURE — 25010000002 PHENOBARBITAL PER 120 MG: Performed by: INTERNAL MEDICINE

## 2025-01-26 PROCEDURE — 71045 X-RAY EXAM CHEST 1 VIEW: CPT

## 2025-01-26 PROCEDURE — 93306 TTE W/DOPPLER COMPLETE: CPT

## 2025-01-26 PROCEDURE — 80061 LIPID PANEL: CPT | Performed by: INTERNAL MEDICINE

## 2025-01-26 RX ORDER — DEXMEDETOMIDINE HYDROCHLORIDE 4 UG/ML
.2-1.5 INJECTION, SOLUTION INTRAVENOUS
Status: DISCONTINUED | OUTPATIENT
Start: 2025-01-26 | End: 2025-01-26 | Stop reason: SDUPTHER

## 2025-01-26 RX ORDER — PHENOBARBITAL 32.4 MG/1
32.4 TABLET ORAL ONCE
Status: COMPLETED | OUTPATIENT
Start: 2025-01-29 | End: 2025-01-29

## 2025-01-26 RX ORDER — CLONIDINE HYDROCHLORIDE 0.1 MG/1
0.1 TABLET ORAL EVERY 4 HOURS PRN
Status: DISCONTINUED | OUTPATIENT
Start: 2025-01-26 | End: 2025-02-01

## 2025-01-26 RX ORDER — PHENOBARBITAL 32.4 MG/1
32.4 TABLET ORAL ONCE
Status: COMPLETED | OUTPATIENT
Start: 2025-01-28 | End: 2025-01-28

## 2025-01-26 RX ORDER — DEXMEDETOMIDINE HYDROCHLORIDE 4 UG/ML
.2-1.5 INJECTION, SOLUTION INTRAVENOUS
Status: DISCONTINUED | OUTPATIENT
Start: 2025-01-26 | End: 2025-01-30

## 2025-01-26 RX ORDER — DIAZEPAM 10 MG/2ML
2.5 INJECTION, SOLUTION INTRAMUSCULAR; INTRAVENOUS EVERY 6 HOURS
Status: DISCONTINUED | OUTPATIENT
Start: 2025-01-26 | End: 2025-01-28

## 2025-01-26 RX ORDER — PHENOBARBITAL SODIUM 65 MG/ML
65 INJECTION, SOLUTION INTRAMUSCULAR; INTRAVENOUS ONCE
Status: COMPLETED | OUTPATIENT
Start: 2025-01-28 | End: 2025-01-28

## 2025-01-26 RX ORDER — PHENOBARBITAL SODIUM 65 MG/ML
65 INJECTION, SOLUTION INTRAMUSCULAR; INTRAVENOUS ONCE
Status: COMPLETED | OUTPATIENT
Start: 2025-01-27 | End: 2025-01-27

## 2025-01-26 RX ADMIN — ASPIRIN 81 MG CHEWABLE TABLET 81 MG: 81 TABLET CHEWABLE at 09:02

## 2025-01-26 RX ADMIN — THIAMINE HYDROCHLORIDE 200 MG: 100 INJECTION, SOLUTION INTRAMUSCULAR; INTRAVENOUS at 22:23

## 2025-01-26 RX ADMIN — SODIUM CHLORIDE 100 ML/HR: 9 INJECTION, SOLUTION INTRAVENOUS at 05:53

## 2025-01-26 RX ADMIN — PANTOPRAZOLE SODIUM 40 MG: 40 INJECTION, POWDER, FOR SOLUTION INTRAVENOUS at 05:51

## 2025-01-26 RX ADMIN — AMLODIPINE BESYLATE 10 MG: 10 TABLET ORAL at 09:03

## 2025-01-26 RX ADMIN — MIDAZOLAM 2 MG: 1 INJECTION INTRAMUSCULAR; INTRAVENOUS at 16:02

## 2025-01-26 RX ADMIN — THIAMINE HYDROCHLORIDE 200 MG: 100 INJECTION, SOLUTION INTRAMUSCULAR; INTRAVENOUS at 13:55

## 2025-01-26 RX ADMIN — DEXMEDETOMIDINE HYDROCHLORIDE IN SODIUM CHLORIDE 1.5 MCG/KG/HR: 4 INJECTION INTRAVENOUS at 21:07

## 2025-01-26 RX ADMIN — DEXMEDETOMIDINE HYDROCHLORIDE IN SODIUM CHLORIDE 0.2 MCG/KG/HR: 4 INJECTION INTRAVENOUS at 17:38

## 2025-01-26 RX ADMIN — THIAMINE HYDROCHLORIDE 200 MG: 100 INJECTION, SOLUTION INTRAMUSCULAR; INTRAVENOUS at 05:51

## 2025-01-26 RX ADMIN — Medication 10 ML: at 09:03

## 2025-01-26 RX ADMIN — METOPROLOL TARTRATE 50 MG: 50 TABLET, FILM COATED ORAL at 09:03

## 2025-01-26 RX ADMIN — LORAZEPAM 2 MG: 1 TABLET ORAL at 11:14

## 2025-01-26 RX ADMIN — MIDAZOLAM 4 MG: 1 INJECTION INTRAMUSCULAR; INTRAVENOUS at 17:22

## 2025-01-26 RX ADMIN — LORAZEPAM 2 MG: 1 TABLET ORAL at 05:51

## 2025-01-26 RX ADMIN — SENNOSIDES AND DOCUSATE SODIUM 2 TABLET: 50; 8.6 TABLET ORAL at 09:03

## 2025-01-26 RX ADMIN — FOLIC ACID 1 MG: 1 TABLET ORAL at 09:03

## 2025-01-26 RX ADMIN — LORAZEPAM 1 MG: 1 TABLET ORAL at 17:43

## 2025-01-26 RX ADMIN — Medication 1 TABLET: at 09:03

## 2025-01-26 RX ADMIN — PHENOBARBITAL SODIUM 325 MG: 130 INJECTION INTRAMUSCULAR; INTRAVENOUS at 22:34

## 2025-01-26 RX ADMIN — MIDAZOLAM 4 MG: 1 INJECTION INTRAMUSCULAR; INTRAVENOUS at 19:57

## 2025-01-26 RX ADMIN — MIDAZOLAM 4 MG: 1 INJECTION INTRAMUSCULAR; INTRAVENOUS at 20:22

## 2025-01-26 RX ADMIN — MIDAZOLAM 4 MG: 1 INJECTION INTRAMUSCULAR; INTRAVENOUS at 17:04

## 2025-01-26 RX ADMIN — MIDAZOLAM 4 MG: 1 INJECTION INTRAMUSCULAR; INTRAVENOUS at 18:47

## 2025-01-26 RX ADMIN — Medication 10 ML: at 20:26

## 2025-01-26 RX ADMIN — LISINOPRIL 40 MG: 20 TABLET ORAL at 09:03

## 2025-01-26 RX ADMIN — MIDAZOLAM 4 MG: 1 INJECTION INTRAMUSCULAR; INTRAVENOUS at 16:25

## 2025-01-26 RX ADMIN — DIAZEPAM 2.5 MG: 10 INJECTION, SOLUTION INTRAMUSCULAR; INTRAVENOUS at 17:39

## 2025-01-26 NOTE — SIGNIFICANT NOTE
01/26/25 1148   OTHER   Discipline physical therapist   Rehab Time/Intention   Session Not Performed other (see comments)  (Pt elenita w/ cardiology upon arrival to floor. Will f/u w/ pt in PM as time allows or possibly next service date.)   Therapy Assessment/Plan (PT)   Criteria for Skilled Interventions Met (PT) yes   Recommendation   PT - Next Appointment 01/27/25

## 2025-01-26 NOTE — PLAN OF CARE
Problem: Adult Inpatient Plan of Care  Goal: Plan of Care Review  Outcome: Progressing  Flowsheets (Taken 1/26/2025 0634)  Progress: no change  Outcome Evaluation: No dizziness noted the whole shift, MRi of brain ordered and questionairre done. Fall risk prevention protocol maintained.  Plan of Care Reviewed With: patient   Goal Outcome Evaluation:  Plan of Care Reviewed With: patient        Progress: no change  Outcome Evaluation: No dizziness noted the whole shift, MRi of brain ordered and questionairre done. Fall risk prevention protocol maintained.

## 2025-01-26 NOTE — CONSULTS
Patient Identification:  Flako Coreas Sr.  59 y.o.  male  1965  [id number]          LOS 0    Requesting physician: Dr. Ma    Reason for Consultation: Increasing agitation and alcohol withdrawal    History of Present Illness:   59-year-old admitted with syncope.  Was on the floor being managed by hospitalist service and cardiology service.  Has had increasing agitation and signs of alcohol withdrawal.  Has been aggressive with staff and needs to transfer to intensive care for Precedex drip and alcohol withdrawal protocol.  Discussed with .  Admitted with syncope and hypertensive emergency.  History of heavy alcohol use.    Past Medical History:  Past Medical History:   Diagnosis Date    ADHD (attention deficit hyperactivity disorder)     On going issue    Anxiety     Lepe's esophagus     Benign prostatic hyperplasia Surgery in 12/2021    Brain concussion 11/2023    Clotting disorder Intestinal    Depression     Diverticulitis     Diverticulosis     Duodenitis     Enteritis     Failure to thrive (child)     Family history of blood clots     Fracture of wrist 1985    Fracture, foot 2019    GERD (gastroesophageal reflux disease)     Hyperlipidemia     Hypertension     Hypertensive emergency     Irritable bowel syndrome 2017    Low testosterone     Microcytosis     Pancreatitis     Pneumonia     PONV (postoperative nausea and vomiting)     Seasonal affective disorder     Shoulder injury     Stroke     Unexplained weight loss     Visual impairment 8/2023       Past Surgical History:  Past Surgical History:   Procedure Laterality Date    CARDIAC ELECTROPHYSIOLOGY PROCEDURE N/A 01/24/2024    Procedure: Loop insertion- [employer] LINQ;  Surgeon: Kaela Walker MD;  Location: [facility] INVASIVE LOCATION;  Service: Cardiovascular;  Laterality: N/A;    COLON SURGERY      COLONOSCOPY      COLONOSCOPY N/A 12/11/2022    Procedure: COLONOSCOPY INTO CECUM WITH HOT SNARE POLYPECTOMY;  Surgeon: Sabino  Albert SOUZA MD;  Location: [facility] ENDOSCOPY;  Service: Gastroenterology;  Laterality: N/A;  PRE- GI BLEED  POST- DIVERTICULOSIS, POLYP    COLONOSCOPY N/A 02/14/2024    Procedure: COLONOSCOPY into cecum/terminal ileum;  Surgeon: Albert Gallo MD;  Location: [facility] ENDOSCOPY;  Service: Gastroenterology;  Laterality: N/A;  diverticulosis, hemorrhoids    ENDOSCOPY N/A 12/10/2022    Procedure: ESOPHAGOGASTRODUODENOSCOPY;  Surgeon: Albert Gallo MD;  Location: [facility] ENDOSCOPY;  Service: Gastroenterology;  Laterality: N/A;  PRE- DARK STOOLS  POST- BARRETTS ESOPHAGUS    ENDOSCOPY N/A 02/14/2024    Procedure: ESOPHAGOGASTRODUODENOSCOPY with biopsy;  Surgeon: Albert Gallo MD;  Location: [facility] ENDOSCOPY;  Service: Gastroenterology;  Laterality: N/A;  long segment wilson's, hiatal hernia    FRACTURE SURGERY Right 1980    for broken arm    FRACTURE SURGERY Right     for broken hand    HAND SURGERY  1990    INCISION AND DRAINAGE ABSCESS  2015    anal    PROSTATE SURGERY      SHOULDER SURGERY  2022    SMALL INTESTINE SURGERY      TONSILLECTOMY      TRIGGER POINT INJECTION  2017        Home Meds:  Medications Prior to Admission   Medication Sig Dispense Refill Last Dose/Taking    amLODIPine (NORVASC) 10 MG tablet Take 1 tablet by mouth Daily.   1/25/2025 at  2:00 AM    carvedilol (COREG) 3.125 MG tablet Take 1 tablet by mouth 2 (Two) Times a Day With Meals. Pt unsure of dose   1/25/2025 at  7:00 AM    hydrALAZINE (APRESOLINE) 25 MG tablet Take 1 tablet by mouth 3 (Three) Times a Day.   1/25/2025 at  4:00 AM    lisinopril (PRINIVIL,ZESTRIL) 40 MG tablet Take 1 tablet by mouth Daily.   1/25/2025 at  2:00 AM    apixaban (ELIQUIS) 5 MG tablet tablet Take 1 tablet by mouth 2 (Two) Times a Day. (Patient not taking: Reported on 7/3/2024) 60 tablet 11     ASPIRIN 81 PO Take  by mouth. (Patient not taking: Reported on 1/25/2025)   Not Taking    lisinopril (PRINIVIL,ZESTRIL) 40 MG tablet Take 1 tablet by mouth Daily for 30 doses.  "30 tablet 0     thiamine (VITAMIN B1) 100 MG tablet Take 1 tablet by mouth Daily. (Patient not taking: Reported on 7/3/2024) 90 tablet 0     vilazodone (Viibryd) 40 MG tablet tablet Take 1 tablet by mouth Daily. 90 tablet 1     vitamin B-12 (CYANOCOBALAMIN) 500 MCG tablet Take 1 tablet by mouth Daily. (Patient not taking: Reported on 7/3/2024) 90 tablet 0          Allergies:  No Known Allergies    Social History:   Social History     Socioeconomic History    Marital status: Single   Tobacco Use    Smoking status: Never    Smokeless tobacco: Never   Vaping Use    Vaping status: Never Used   Substance and Sexual Activity    Alcohol use: Not Currently     Comment: 12 beers on the weekend    Drug use: Not Currently     Frequency: 1.0 times per week     Types: Marijuana     Comment: patient states he has been smoking marijuania daily x 2 weeks    Sexual activity: Not Currently     Partners: Female     Birth control/protection: Other, Birth control pill       Family History:  Family History   Problem Relation Age of Onset    Stroke Mother     Heart disease Mother     Stroke Father     Hyperlipidemia Father     Hypertension Father        Review of Systems:  Confused and fighting with staff.  Unable to obtain review of systems    Objective:    PHYSICAL EXAM:    /76 (BP Location: Left arm, Patient Position: Lying)   Pulse 64   Temp 97.9 °F (36.6 °C) (Oral)   Resp 18   Ht 188 cm (74\")   Wt 102 kg (225 lb)   SpO2 98%   BMI 28.89 kg/m²  Body mass index is 28.89 kg/m². 98% 102 kg (225 lb)    GENERAL APPEARANCE:   Well developed  Agitated  In 4 point restraints   EYES:    PERRL                                                                           Conjunctivae normal  Sclerae nonicteric.  HENT:   Atraumatic, normocephalic  External ears and nose normal  Moist mucous membranes and no ulcers  NECK:  Thyroid not enlarged  Trachea midline   RESPIRATORY:    Nonlabored breathing   Normal breath sounds  No rales. No " wheezing  No dullness  CARDIOVASCULAR:    RRR  Normal S1, S2  No murmur  Lower extremity edema: none    GI:   Bowel sounds normal  Abdomen soft , nondistended, nontender  No abdominal masses  MUSCULOSKELETAL:  Normal movement of extremities  No tenderness, no deformities  No clubbing or cyanosis   Skin:    No visible rashes  No palpable nodules  Cap refill normal.  No mottling.   PSYCHIATRIC:  Speech and behavior appropriate  Normal mood and affect  Oriented to person, place and time  NEUROLOGIC:  Cranial nerves II through XII grossly intact.  Sensation intact.      Lab Review:   Results from last 7 days   Lab Units 01/26/25  0609 01/25/25  0928   WBC 10*3/mm3 6.06 8.55   HEMOGLOBIN g/dL 14.7 16.1   HEMATOCRIT % 43.9 48.3   PLATELETS 10*3/mm3 223 273     Results from last 7 days   Lab Units 01/26/25  0609 01/25/25  0928   SODIUM mmol/L 139 140   POTASSIUM mmol/L 3.9 4.1   CHLORIDE mmol/L 106 103   CO2 mmol/L 23.0 23.4   BUN mg/dL 14 12   CREATININE mg/dL 0.92 0.82   CALCIUM mg/dL 8.4* 9.0   BILIRUBIN mg/dL  --  0.5   ALK PHOS U/L  --  72   ALT (SGPT) U/L  --  31   AST (SGOT) U/L  --  22   GLUCOSE mg/dL 119* 95                       Imaging reviewed  chest X-ray shows no acute process.    CT chest reviewed shows no evidence of PE.  No acute cardiopulmonary process.         2D echo reviewed: EF 60%.  Left ventricular cavity borderline dilated.  Normal diastolic function described.  RVSP less than 35 mmHg.          Assessment:  Alcohol withdrawal with worsening agitation and combativeness  Syncope  Hypertensive emergency  Mood disorder  History of atrial fibrillation  Gastrosoft reflux disease with esophagitis and Lepe's esophagus  Diverticulosis        Recommendations:  Continue alcohol withdrawal protocol with CIWA scores and appropriate treatment but with significantly increased agitation and combativeness will require benzo resistant protocol and Precedex drip until this can be more acutely controlled and then  we can back down the Precedex drip again.  Discussed with nursing staff and family at bedside.        Thank you for allowing me to participate in the care of this patient.  I will continue to follow along with you.    Face to Face Evaluation for Restraint done as above.          CCT: 35 min    Jairon Fischer MD  [facility], [facility]  Pulmonary and Critical Care Medicine    1/26/2025  16:31 EST

## 2025-01-26 NOTE — PROGRESS NOTES
Name: Flako Coreas Sr. ADMIT: 2025   : 1965  PCP: Akash Rodriguez Jr.,     MRN: [id number] LOS: 0 days   AGE/SEX: 59 y.o. male  ROOM[facility]     Subjective   Subjective   Feels short of breath.  No further syncopal episodes since admission.  No chest pain.       Objective   Objective   Vital Signs  Temp:  [97.3 °F (36.3 °C)-97.9 °F (36.6 °C)] 97.9 °F (36.6 °C)  Heart Rate:  [55-72] 64  Resp:  [18-20] 18  BP: (115-169)/() 118/76  SpO2:  [93 %-98 %] 98 %  on   ;   Device (Oxygen Therapy): room air  Body mass index is 28.89 kg/m².  Physical Exam  Vitals and nursing note reviewed.   Constitutional:       General: He is not in acute distress.  Cardiovascular:      Rate and Rhythm: Normal rate and regular rhythm.   Pulmonary:      Effort: Pulmonary effort is normal.      Breath sounds: Normal breath sounds.   Abdominal:      General: Bowel sounds are normal.      Palpations: Abdomen is soft.      Tenderness: There is no abdominal tenderness.   Musculoskeletal:         General: No swelling.   Skin:     General: Skin is warm and dry.   Neurological:      Mental Status: He is alert. Mental status is at baseline.       Results Review     I reviewed the patient's new clinical results.  Results from last 7 days   Lab Units 25  0609 25  0928   WBC 10*3/mm3 6.06 8.55   HEMOGLOBIN g/dL 14.7 16.1   PLATELETS 10*3/mm3 223 273     Results from last 7 days   Lab Units 25  0609 25  0928   SODIUM mmol/L 139 140   POTASSIUM mmol/L 3.9 4.1   CHLORIDE mmol/L 106 103   CO2 mmol/L 23.0 23.4   BUN mg/dL 14 12   CREATININE mg/dL 0.92 0.82   GLUCOSE mg/dL 119* 95   EGFR mL/min/1.73 97.0 102.5     Results from last 7 days   Lab Units 25  0928   ALBUMIN g/dL 4.3   BILIRUBIN mg/dL 0.5   ALK PHOS U/L 72   AST (SGOT) U/L 22   ALT (SGPT) U/L 31     Results from last 7 days   Lab Units 25  0609 25  0928   CALCIUM mg/dL 8.4* 9.0   ALBUMIN g/dL  --  4.3   MAGNESIUM mg/dL  --   "2.1       No results found for: \"HGBA1C\", \"POCGLU\"    CT Head Without Contrast    Result Date: 1/26/2025  No acute process.    Radiation dose reduction techniques were utilized, including automated exposure control and exposure modulation based on body size.   This report was finalized on 1/26/2025 9:24 AM by Dr. Salvador Arita M.D on Workstation: [id number]      CT Angiogram Chest Pulmonary Embolism    Result Date: 1/25/2025  No evidence of pulmonary embolus.  Radiation dose reduction techniques were utilized, including automated exposure control and exposure modulation based on body size.       XR Chest 1 View    Result Date: 1/25/2025  1. No acute process.   This report was finalized on 1/25/2025 9:57 AM by Dr. Salvador Arita M.D on Workstation: [id number]       I have personally reviewed all medications:  Scheduled Medications  amLODIPine, 10 mg, Oral, Daily  aspirin, 81 mg, Oral, Daily  atorvastatin, 40 mg, Oral, Nightly  folic acid, 1 mg, Oral, Daily  lisinopril, 40 mg, Oral, Daily  LORazepam, 1 mg, Oral, Q6H   Followed by  [START ON 1/27/2025] LORazepam, 1 mg, Oral, Q12H   Followed by  [START ON 1/29/2025] LORazepam, 1 mg, Oral, Daily  metoprolol tartrate, 50 mg, Oral, Q12H  multivitamin with minerals, 1 tablet, Oral, Daily  pantoprazole, 40 mg, Intravenous, Q AM  senna-docusate sodium, 2 tablet, Oral, BID  sodium chloride, 10 mL, Intravenous, Q12H  thiamine (B-1) IV, 200 mg, Intravenous, Q8H   Followed by  [START ON 1/31/2025] thiamine, 100 mg, Oral, Daily    Infusions  sodium chloride, 100 mL/hr, Last Rate: Stopped (01/26/25 1258)    Diet  Diet: Cardiac; Healthy Heart (2-3 Na+); Fluid Consistency: Thin (IDDSI 0)    I have personally reviewed:  [x]  Laboratory   [x]  Microbiology   [x]  Radiology   [x]  EKG/Telemetry  [x]  Cardiology/Vascular   []  Pathology    []  Records  CIWA (last 3 days)        Date/Time CIWA-Ar Total    01/26/25 0909 0     01/25/25 2000 0     01/25/25 1515 3                    " "  Assessment/Plan     Active Hospital Problems    Diagnosis  POA    **Syncope [R55]  Yes    Hypertensive emergency [I16.1]  Yes    History of atrial fibrillation [Z86.79]  Not Applicable    Alcohol use [Z78.9]  Yes    Mood disorder [F39]  Yes    History of CVA (cerebrovascular accident) [Z86.73]  Not Applicable      Resolved Hospital Problems   No resolved problems to display.       59 y.o. male admitted with Syncope.    Patient admitted with syncope and severely elevated blood pressure.  Reports about 6 syncopal episodes over the last year or so.  He has a loop recorder and has had previous \"14-second\" sinus pause that he states he was told was vagal.  Cardiology has been consulted and will need loop recorder interrogated.  Blood pressure little better today than on admission.  CTA negative for pulmonary embolus or acute lung process.  Wonder if shortness of breath is related to his uncontrolled hypertension along with anxiety.  MRI brain ordered on admission due to patient's history although seemingly no new neurologic issues or deficits.    Thus far CIWA scores remain low.      SCDs for DVT prophylaxis.  Full code.  Discussed with patient.  Anticipate discharge home in 1-2 days.  Expected Discharge Date: 1/27/2025; Expected Discharge Time:       Teddy Ma MD  [facility]  01/26/25  14:18 EST  "

## 2025-01-26 NOTE — CONSULTS
Date of Consultation: 25    Referral Provider: Teddy Ma MD     Reason for Consultation: Syncope     Encounter Provider: JANEEN Ty    Group of Service: [facility]     Patient Name: Flako Coreas Sr.    :1965    Chief complaint: Syncope     History of Present Illness: Flako Coreas is a 59 year old who is followed by Dr. Taylor and Dr. Langford. He has a past medical history that is significant for cerebellar CVA with residual ataxia and frequent falls, alcohol use, GERD, dyslipidemia, diverticulosis, hypertension, atrial fibrillation, and mood disorder.     He was initially seen by cardiology in 2023 when he was hospitalized with hypertensive emergency and acute left cerebellar strokes. Since this he has had multiple episodes of dizziness, near syncope, and syncope. He had a loop recorder placed in 2024. In 2024 he had a syncopal episode while using the bathroom. His loop recorder showed a 13 second pause. This was felt to likely have been vasovagal.     He presented to the ED on 25 with complaints of a syncopal episode which occurred when he got out of bed. He does not remember hitting the floor, he does have an abrasion to his face. He denies prodrome or associated symptoms such as lightheadedness, dizziness, chest pain or discomfort, or palpitations. He does report that he has had ongoing dyspnea for the last month.     He reports he was taken off of his Eliquis due to his recurrent syncope.     Workup in the ED included: HS troponin negative x 2. proBNP 50.4. CMP/CBC unremarkable. D-dimer negative. Chest xray with no acute process. CT of the head with no acute process. EKG showed sinus rhythm with HR 70 bpm.     On exam he is resting in bed, he is very tired. He denies chest pain or discomfort, palpitations. He has persistent shortness of breath.     Echocardiogram 25    Left ventricular ejection fraction appears to be 56 - 60%.    The  left ventricular cavity is borderline dilated.    Left ventricular diastolic function was normal.    Estimated right ventricular systolic pressure from tricuspid regurgitation is normal (<35 mmHg).     Treadmill Stress Test 10/2/23    Equivocal ECG evidence of myocardial ischemia.  This was a submaximal stress test.  73% of age-predicted heart rate achieved.    Negative clinical evidence of myocardial ischemia.    Past Medical History:   Diagnosis Date    ADHD (attention deficit hyperactivity disorder)     On going issue    Anxiety     Lepe's esophagus     Benign prostatic hyperplasia Surgery in 12/2021    Brain concussion 11/2023    Clotting disorder Intestinal    Depression     Diverticulitis     Diverticulosis     Duodenitis     Enteritis     Failure to thrive (child)     Family history of blood clots     Fracture of wrist 1985    Fracture, foot 2019    GERD (gastroesophageal reflux disease)     Hyperlipidemia     Hypertension     Hypertensive emergency     Irritable bowel syndrome 2017    Low testosterone     Microcytosis     Pancreatitis     Pneumonia     PONV (postoperative nausea and vomiting)     Seasonal affective disorder     Shoulder injury     Stroke     Unexplained weight loss     Visual impairment 8/2023         Past Surgical History:   Procedure Laterality Date    CARDIAC ELECTROPHYSIOLOGY PROCEDURE N/A 01/24/2024    Procedure: Loop insertion- [employer] LINQ;  Surgeon: Kaela Walker MD;  Location: [facility] CATH INVASIVE LOCATION;  Service: Cardiovascular;  Laterality: N/A;    COLON SURGERY      COLONOSCOPY      COLONOSCOPY N/A 12/11/2022    Procedure: COLONOSCOPY INTO CECUM WITH HOT SNARE POLYPECTOMY;  Surgeon: Albert Gallo MD;  Location: [facility] ENDOSCOPY;  Service: Gastroenterology;  Laterality: N/A;  PRE- GI BLEED  POST- DIVERTICULOSIS, POLYP    COLONOSCOPY N/A 02/14/2024    Procedure: COLONOSCOPY into cecum/terminal ileum;  Surgeon: Albert Gallo MD;  Location: [facility] ENDOSCOPY;  Service:  Gastroenterology;  Laterality: N/A;  diverticulosis, hemorrhoids    ENDOSCOPY N/A 12/10/2022    Procedure: ESOPHAGOGASTRODUODENOSCOPY;  Surgeon: Albert Gallo MD;  Location: [facility] ENDOSCOPY;  Service: Gastroenterology;  Laterality: N/A;  PRE- DARK STOOLS  POST- BARRETTS ESOPHAGUS    ENDOSCOPY N/A 02/14/2024    Procedure: ESOPHAGOGASTRODUODENOSCOPY with biopsy;  Surgeon: Albert Gallo MD;  Location: [facility] ENDOSCOPY;  Service: Gastroenterology;  Laterality: N/A;  long segment wilson's, hiatal hernia    FRACTURE SURGERY Right 1980    for broken arm    FRACTURE SURGERY Right     for broken hand    HAND SURGERY  1990    INCISION AND DRAINAGE ABSCESS  2015    anal    PROSTATE SURGERY      SHOULDER SURGERY  2022    SMALL INTESTINE SURGERY      TONSILLECTOMY      TRIGGER POINT INJECTION  2017         No Known Allergies      No current facility-administered medications on file prior to encounter.     Current Outpatient Medications on File Prior to Encounter   Medication Sig Dispense Refill    amLODIPine (NORVASC) 10 MG tablet Take 1 tablet by mouth Daily.      carvedilol (COREG) 3.125 MG tablet Take 1 tablet by mouth 2 (Two) Times a Day With Meals. Pt unsure of dose      hydrALAZINE (APRESOLINE) 25 MG tablet Take 1 tablet by mouth 3 (Three) Times a Day.      lisinopril (PRINIVIL,ZESTRIL) 40 MG tablet Take 1 tablet by mouth Daily.      apixaban (ELIQUIS) 5 MG tablet tablet Take 1 tablet by mouth 2 (Two) Times a Day. (Patient not taking: Reported on 7/3/2024) 60 tablet 11    ASPIRIN 81 PO Take  by mouth. (Patient not taking: Reported on 1/25/2025)      lisinopril (PRINIVIL,ZESTRIL) 40 MG tablet Take 1 tablet by mouth Daily for 30 doses. 30 tablet 0    thiamine (VITAMIN B1) 100 MG tablet Take 1 tablet by mouth Daily. (Patient not taking: Reported on 7/3/2024) 90 tablet 0    vilazodone (Viibryd) 40 MG tablet tablet Take 1 tablet by mouth Daily. 90 tablet 1    vitamin B-12 (CYANOCOBALAMIN) 500 MCG tablet Take 1 tablet by  "mouth Daily. (Patient not taking: Reported on 7/3/2024) 90 tablet 0         Social History     Socioeconomic History    Marital status: Single   Tobacco Use    Smoking status: Never    Smokeless tobacco: Never   Vaping Use    Vaping status: Never Used   Substance and Sexual Activity    Alcohol use: Not Currently     Comment: 12 beers on the weekend    Drug use: Not Currently     Frequency: 1.0 times per week     Types: Marijuana     Comment: patient states he has been smoking marijuania daily x 2 weeks    Sexual activity: Not Currently     Partners: Female     Birth control/protection: Other, Birth control pill         Family History   Problem Relation Age of Onset    Stroke Mother     Heart disease Mother     Stroke Father     Hyperlipidemia Father     Hypertension Father        REVIEW OF SYSTEMS:   12 point ROS was performed and is negative except as outlined in HPI       Objective:     Vitals:    01/26/25 0750 01/26/25 1211 01/26/25 1320 01/26/25 1354   BP: 120/70  (!) 169/106 118/76   BP Location: Right arm  Right arm Left arm   Patient Position: Lying  Lying Lying   Pulse: 57  64    Resp: 18  18    Temp: 97.9 °F (36.6 °C)  97.9 °F (36.6 °C)    TempSrc: Oral  Oral    SpO2: 97%  98%    Weight:  102 kg (225 lb)     Height:  188 cm (74\")       Body mass index is 28.89 kg/m².  Flowsheet Rows      Flowsheet Row First Filed Value   Admission Height 188 cm (74\") Documented at 01/25/2025 0933   Admission Weight 102 kg (225 lb) Documented at 01/25/2025 0933              Physical Exam  Vitals reviewed.   Constitutional:       General: He is not in acute distress.  HENT:      Head: Normocephalic.      Nose: Nose normal.   Eyes:      Extraocular Movements: Extraocular movements intact.      Pupils: Pupils are equal, round, and reactive to light.   Cardiovascular:      Rate and Rhythm: Normal rate and regular rhythm.      Pulses: Normal pulses.      Heart sounds: Normal heart sounds. Heart sounds not distant. No murmur " heard.     No friction rub. No gallop. No S3 or S4 sounds.   Pulmonary:      Effort: Pulmonary effort is normal.      Breath sounds: Normal breath sounds.   Abdominal:      General: Abdomen is flat. Bowel sounds are normal.      Palpations: Abdomen is soft.      Tenderness: There is no abdominal tenderness.   Skin:     General: Skin is warm and dry.   Neurological:      General: No focal deficit present.      Mental Status: He is alert and oriented to person, place, and time. Mental status is at baseline.   Psychiatric:         Mood and Affect: Mood normal.         Behavior: Behavior normal.         Lab Review:                Results from last 7 days   Lab Units 01/26/25  0609   SODIUM mmol/L 139   POTASSIUM mmol/L 3.9   CHLORIDE mmol/L 106   CO2 mmol/L 23.0   BUN mg/dL 14   CREATININE mg/dL 0.92   GLUCOSE mg/dL 119*   CALCIUM mg/dL 8.4*     Results from last 7 days   Lab Units 01/26/25  0609 01/25/25  1034 01/25/25  0928   HSTROP T ng/L <6 <6 <6     Results from last 7 days   Lab Units 01/26/25  0609   WBC 10*3/mm3 6.06   HEMOGLOBIN g/dL 14.7   HEMATOCRIT % 43.9   PLATELETS 10*3/mm3 223         Results from last 7 days   Lab Units 01/26/25  0609   CHOLESTEROL mg/dL 163     Results from last 7 days   Lab Units 01/25/25  0928   MAGNESIUM mg/dL 2.1     Results from last 7 days   Lab Units 01/26/25  0609   CHOLESTEROL mg/dL 163   TRIGLYCERIDES mg/dL 154*   HDL CHOL mg/dL 33*   LDL CHOL mg/dL 103*       EKG:      Assessment/Plan:   Syncope  Occurred without prodrome. He did hit his face with the fall.   BO.LT has been contacted to interrogate his loop recorder.   Echocardiogram today showed normal left ventricular systolic and diastolic function, EF 56-60%, normal wall motion, and no hemodynamically significant valvular disease.   Dyspnea  Is this secondary to his hypertension? Echocardiogram with preserved LVEF and no significant valvular disease. Chest xray with no acute findings. Appears euvolemic on exam. Could  deconditioning be a contributing factor?   Hypertensive emergency   BP significantly elevated on admission, improved today. Amlodipine, lisinopril, metoprolol tartrate have been continued.   Atrial fibrillation  Rate controlled, in sinus on my exam. Continue beta blocker. Patient reports his Eliquis was stopped due to frequent falls, though in the past he has reported it was stopped as it was cost prohibitive.   Alcohol use   CIWA protocol in place   Mood disorder  History of CVA     Cardiology will follow, await loop recorder interrogation. Thank you for this consult.     Judy Madden, APRN   01/26/25

## 2025-01-26 NOTE — SIGNIFICANT NOTE
01/26/25 3823   OTHER   Discipline physical therapist   Rehab Time/Intention   Session Not Performed other (see comments)  (Upon return in PM, pt w/ increasing agitation w/ nurse. Nursing requesting to hold, offered to watch pt to maintain his safety while nurse called for assistance. Pt became agitated w/ PT outside his room requesting he stay seated due to instability and)   Therapy Assessment/Plan (PT)   Criteria for Skilled Interventions Met (PT) yes  (and risk for falls. As PT stepped into room to ensure his safety, he stated he wanted PT to leave and hurried to the door and shut the door in front of PT. Will f/u when appropriate, nursing reports this is a new onset since admission.)   Recommendation   PT - Next Appointment 01/27/25

## 2025-01-27 ENCOUNTER — APPOINTMENT (OUTPATIENT)
Dept: CT IMAGING | Facility: HOSPITAL | Age: 60
DRG: 312 | End: 2025-01-27

## 2025-01-27 PROBLEM — F10.939 ALCOHOL WITHDRAWAL: Status: ACTIVE | Noted: 2025-01-27

## 2025-01-27 LAB
ANION GAP SERPL CALCULATED.3IONS-SCNC: 10 MMOL/L (ref 5–15)
BASOPHILS # BLD AUTO: 0.03 10*3/MM3 (ref 0–0.2)
BASOPHILS NFR BLD AUTO: 0.4 % (ref 0–1.5)
BUN SERPL-MCNC: 15 MG/DL (ref 6–20)
BUN/CREAT SERPL: 17.6 (ref 7–25)
CALCIUM SPEC-SCNC: 7.9 MG/DL (ref 8.6–10.5)
CHLORIDE SERPL-SCNC: 108 MMOL/L (ref 98–107)
CO2 SERPL-SCNC: 21 MMOL/L (ref 22–29)
CREAT SERPL-MCNC: 0.85 MG/DL (ref 0.76–1.27)
DEPRECATED RDW RBC AUTO: 41.8 FL (ref 37–54)
EGFRCR SERPLBLD CKD-EPI 2021: 101.4 ML/MIN/1.73
EOSINOPHIL # BLD AUTO: 0.4 10*3/MM3 (ref 0–0.4)
EOSINOPHIL NFR BLD AUTO: 5.8 % (ref 0.3–6.2)
ERYTHROCYTE [DISTWIDTH] IN BLOOD BY AUTOMATED COUNT: 13.4 % (ref 12.3–15.4)
GLUCOSE SERPL-MCNC: 113 MG/DL (ref 65–99)
HCT VFR BLD AUTO: 41.3 % (ref 37.5–51)
HCT VFR BLD AUTO: 42.4 % (ref 37.5–51)
HGB BLD-MCNC: 14 G/DL (ref 13–17.7)
HGB BLD-MCNC: 14 G/DL (ref 13–17.7)
IMM GRANULOCYTES # BLD AUTO: 0.01 10*3/MM3 (ref 0–0.05)
IMM GRANULOCYTES NFR BLD AUTO: 0.1 % (ref 0–0.5)
LYMPHOCYTES # BLD AUTO: 1.57 10*3/MM3 (ref 0.7–3.1)
LYMPHOCYTES NFR BLD AUTO: 22.7 % (ref 19.6–45.3)
MCH RBC QN AUTO: 28.5 PG (ref 26.6–33)
MCHC RBC AUTO-ENTMCNC: 33 G/DL (ref 31.5–35.7)
MCV RBC AUTO: 86.4 FL (ref 79–97)
MONOCYTES # BLD AUTO: 0.53 10*3/MM3 (ref 0.1–0.9)
MONOCYTES NFR BLD AUTO: 7.7 % (ref 5–12)
NEUTROPHILS NFR BLD AUTO: 4.37 10*3/MM3 (ref 1.7–7)
NEUTROPHILS NFR BLD AUTO: 63.3 % (ref 42.7–76)
NRBC BLD AUTO-RTO: 0 /100 WBC (ref 0–0.2)
PLATELET # BLD AUTO: 201 10*3/MM3 (ref 140–450)
PMV BLD AUTO: 9.9 FL (ref 6–12)
POTASSIUM SERPL-SCNC: 3.7 MMOL/L (ref 3.5–5.2)
RBC # BLD AUTO: 4.91 10*6/MM3 (ref 4.14–5.8)
SODIUM SERPL-SCNC: 139 MMOL/L (ref 136–145)
WBC NRBC COR # BLD AUTO: 6.91 10*3/MM3 (ref 3.4–10.8)

## 2025-01-27 PROCEDURE — 85025 COMPLETE CBC W/AUTO DIFF WBC: CPT | Performed by: INTERNAL MEDICINE

## 2025-01-27 PROCEDURE — 70496 CT ANGIOGRAPHY HEAD: CPT

## 2025-01-27 PROCEDURE — 85014 HEMATOCRIT: CPT | Performed by: STUDENT IN AN ORGANIZED HEALTH CARE EDUCATION/TRAINING PROGRAM

## 2025-01-27 PROCEDURE — 25010000002 MIDAZOLAM PER 1 MG: Performed by: INTERNAL MEDICINE

## 2025-01-27 PROCEDURE — 25510000001 IOPAMIDOL PER 1 ML: Performed by: HOSPITALIST

## 2025-01-27 PROCEDURE — 70498 CT ANGIOGRAPHY NECK: CPT

## 2025-01-27 PROCEDURE — 25010000002 PHENOBARBITAL PER 120 MG: Performed by: INTERNAL MEDICINE

## 2025-01-27 PROCEDURE — 85018 HEMOGLOBIN: CPT | Performed by: STUDENT IN AN ORGANIZED HEALTH CARE EDUCATION/TRAINING PROGRAM

## 2025-01-27 PROCEDURE — 25010000002 DIAZEPAM PER 5 MG: Performed by: INTERNAL MEDICINE

## 2025-01-27 PROCEDURE — 25810000003 SODIUM CHLORIDE 0.9 % SOLUTION: Performed by: INTERNAL MEDICINE

## 2025-01-27 PROCEDURE — 25010000002 THIAMINE PER 100 MG: Performed by: INTERNAL MEDICINE

## 2025-01-27 PROCEDURE — 99232 SBSQ HOSP IP/OBS MODERATE 35: CPT | Performed by: INTERNAL MEDICINE

## 2025-01-27 PROCEDURE — 25010000002 DIAZEPAM PER 5 MG: Performed by: STUDENT IN AN ORGANIZED HEALTH CARE EDUCATION/TRAINING PROGRAM

## 2025-01-27 PROCEDURE — 80048 BASIC METABOLIC PNL TOTAL CA: CPT | Performed by: INTERNAL MEDICINE

## 2025-01-27 PROCEDURE — 25010000002 THIAMINE HCL 200 MG/2ML SOLUTION: Performed by: INTERNAL MEDICINE

## 2025-01-27 RX ORDER — PANTOPRAZOLE SODIUM 40 MG/10ML
40 INJECTION, POWDER, LYOPHILIZED, FOR SOLUTION INTRAVENOUS
Status: DISCONTINUED | OUTPATIENT
Start: 2025-01-28 | End: 2025-01-27

## 2025-01-27 RX ORDER — DIAZEPAM 10 MG/2ML
20 INJECTION, SOLUTION INTRAMUSCULAR; INTRAVENOUS
Status: DISCONTINUED | OUTPATIENT
Start: 2025-01-27 | End: 2025-01-28

## 2025-01-27 RX ORDER — MIDAZOLAM HYDROCHLORIDE 1 MG/ML
4 INJECTION, SOLUTION INTRAMUSCULAR; INTRAVENOUS ONCE
Status: COMPLETED | OUTPATIENT
Start: 2025-01-28 | End: 2025-01-27

## 2025-01-27 RX ORDER — DIAZEPAM 5 MG/1
20 TABLET ORAL
Status: DISCONTINUED | OUTPATIENT
Start: 2025-01-27 | End: 2025-01-28

## 2025-01-27 RX ORDER — DIAZEPAM 10 MG/2ML
10 INJECTION, SOLUTION INTRAMUSCULAR; INTRAVENOUS
Status: DISCONTINUED | OUTPATIENT
Start: 2025-01-27 | End: 2025-01-28

## 2025-01-27 RX ORDER — DIAZEPAM 5 MG/1
10 TABLET ORAL
Status: DISCONTINUED | OUTPATIENT
Start: 2025-01-27 | End: 2025-01-28

## 2025-01-27 RX ORDER — MIDAZOLAM HYDROCHLORIDE 1 MG/ML
4 INJECTION, SOLUTION INTRAMUSCULAR; INTRAVENOUS
Status: DISCONTINUED | OUTPATIENT
Start: 2025-01-27 | End: 2025-01-28 | Stop reason: ALTCHOICE

## 2025-01-27 RX ORDER — IOPAMIDOL 755 MG/ML
100 INJECTION, SOLUTION INTRAVASCULAR
Status: COMPLETED | OUTPATIENT
Start: 2025-01-27 | End: 2025-01-27

## 2025-01-27 RX ORDER — DIAZEPAM 5 MG/1
15 TABLET ORAL
Status: DISCONTINUED | OUTPATIENT
Start: 2025-01-27 | End: 2025-01-28

## 2025-01-27 RX ORDER — PANTOPRAZOLE SODIUM 40 MG/1
40 TABLET, DELAYED RELEASE ORAL
Status: DISCONTINUED | OUTPATIENT
Start: 2025-01-28 | End: 2025-01-27

## 2025-01-27 RX ORDER — DIAZEPAM 10 MG/2ML
15 INJECTION, SOLUTION INTRAMUSCULAR; INTRAVENOUS
Status: DISCONTINUED | OUTPATIENT
Start: 2025-01-27 | End: 2025-01-28

## 2025-01-27 RX ADMIN — THIAMINE HYDROCHLORIDE 200 MG: 100 INJECTION, SOLUTION INTRAMUSCULAR; INTRAVENOUS at 14:49

## 2025-01-27 RX ADMIN — MUPIROCIN 1 APPLICATION: 20 OINTMENT TOPICAL at 08:21

## 2025-01-27 RX ADMIN — PHENOBARBITAL SODIUM 260 MG: 130 INJECTION INTRAMUSCULAR; INTRAVENOUS at 00:47

## 2025-01-27 RX ADMIN — PHENOBARBITAL SODIUM 260 MG: 130 INJECTION INTRAMUSCULAR; INTRAVENOUS at 03:19

## 2025-01-27 RX ADMIN — FOLIC ACID 1 MG: 5 INJECTION, SOLUTION INTRAMUSCULAR; INTRAVENOUS; SUBCUTANEOUS at 11:08

## 2025-01-27 RX ADMIN — DEXMEDETOMIDINE HYDROCHLORIDE IN SODIUM CHLORIDE 1 MCG/KG/HR: 4 INJECTION INTRAVENOUS at 00:01

## 2025-01-27 RX ADMIN — MIDAZOLAM 2 MG: 1 INJECTION INTRAMUSCULAR; INTRAVENOUS at 06:52

## 2025-01-27 RX ADMIN — DIAZEPAM 10 MG: 5 INJECTION INTRAMUSCULAR; INTRAVENOUS at 14:51

## 2025-01-27 RX ADMIN — THIAMINE HYDROCHLORIDE 200 MG: 100 INJECTION, SOLUTION INTRAMUSCULAR; INTRAVENOUS at 21:04

## 2025-01-27 RX ADMIN — METOPROLOL TARTRATE 50 MG: 50 TABLET, FILM COATED ORAL at 20:09

## 2025-01-27 RX ADMIN — DIAZEPAM 2.5 MG: 10 INJECTION, SOLUTION INTRAMUSCULAR; INTRAVENOUS at 05:18

## 2025-01-27 RX ADMIN — MIDAZOLAM 4 MG: 1 INJECTION INTRAMUSCULAR; INTRAVENOUS at 02:15

## 2025-01-27 RX ADMIN — DEXMEDETOMIDINE HYDROCHLORIDE IN SODIUM CHLORIDE 0.6 MCG/KG/HR: 4 INJECTION INTRAVENOUS at 12:35

## 2025-01-27 RX ADMIN — PANTOPRAZOLE SODIUM 40 MG: 40 INJECTION, POWDER, FOR SOLUTION INTRAVENOUS at 05:18

## 2025-01-27 RX ADMIN — LISINOPRIL 40 MG: 20 TABLET ORAL at 08:21

## 2025-01-27 RX ADMIN — DEXMEDETOMIDINE HYDROCHLORIDE IN SODIUM CHLORIDE 0.4 MCG/KG/HR: 4 INJECTION INTRAVENOUS at 05:24

## 2025-01-27 RX ADMIN — PHENOBARBITAL SODIUM 65 MG: 65 INJECTION INTRAMUSCULAR; INTRAVENOUS at 14:49

## 2025-01-27 RX ADMIN — ACETAMINOPHEN 650 MG: 325 TABLET, FILM COATED ORAL at 20:15

## 2025-01-27 RX ADMIN — MUPIROCIN 1 APPLICATION: 20 OINTMENT TOPICAL at 20:10

## 2025-01-27 RX ADMIN — DIAZEPAM 2.5 MG: 10 INJECTION, SOLUTION INTRAMUSCULAR; INTRAVENOUS at 11:08

## 2025-01-27 RX ADMIN — DIAZEPAM 20 MG: 5 INJECTION INTRAMUSCULAR; INTRAVENOUS at 23:32

## 2025-01-27 RX ADMIN — MUPIROCIN 1 APPLICATION: 20 OINTMENT TOPICAL at 00:02

## 2025-01-27 RX ADMIN — SODIUM CHLORIDE 8 MG/HR: 900 INJECTION INTRAVENOUS at 18:13

## 2025-01-27 RX ADMIN — DIAZEPAM 2.5 MG: 10 INJECTION, SOLUTION INTRAMUSCULAR; INTRAVENOUS at 00:01

## 2025-01-27 RX ADMIN — LORAZEPAM 1 MG: 1 TABLET ORAL at 20:09

## 2025-01-27 RX ADMIN — DIAZEPAM 2.5 MG: 10 INJECTION, SOLUTION INTRAMUSCULAR; INTRAVENOUS at 22:49

## 2025-01-27 RX ADMIN — SODIUM CHLORIDE 8 MG/HR: 900 INJECTION INTRAVENOUS at 22:49

## 2025-01-27 RX ADMIN — LORAZEPAM 1 MG: 1 TABLET ORAL at 11:08

## 2025-01-27 RX ADMIN — AMLODIPINE BESYLATE 10 MG: 10 TABLET ORAL at 08:20

## 2025-01-27 RX ADMIN — ATORVASTATIN CALCIUM 40 MG: 20 TABLET, FILM COATED ORAL at 20:08

## 2025-01-27 RX ADMIN — SODIUM CHLORIDE 500 ML: 9 INJECTION, SOLUTION INTRAVENOUS at 21:12

## 2025-01-27 RX ADMIN — Medication 10 ML: at 08:20

## 2025-01-27 RX ADMIN — Medication 1 TABLET: at 08:22

## 2025-01-27 RX ADMIN — MIDAZOLAM 4 MG: 1 INJECTION INTRAMUSCULAR; INTRAVENOUS at 23:53

## 2025-01-27 RX ADMIN — THIAMINE HYDROCHLORIDE 200 MG: 100 INJECTION, SOLUTION INTRAMUSCULAR; INTRAVENOUS at 05:18

## 2025-01-27 RX ADMIN — IOPAMIDOL 95 ML: 755 INJECTION, SOLUTION INTRAVENOUS at 10:39

## 2025-01-27 RX ADMIN — DIAZEPAM 2.5 MG: 10 INJECTION, SOLUTION INTRAMUSCULAR; INTRAVENOUS at 17:09

## 2025-01-27 RX ADMIN — ASPIRIN 81 MG CHEWABLE TABLET 81 MG: 81 TABLET CHEWABLE at 08:21

## 2025-01-27 NOTE — PLAN OF CARE
Problem: Adult Inpatient Plan of Care  Goal: Plan of Care Review  Outcome: Progressing  Goal: Patient-Specific Goal (Individualized)  Outcome: Progressing  Goal: Absence of Hospital-Acquired Illness or Injury  Outcome: Progressing  Intervention: Identify and Manage Fall Risk  Recent Flowsheet Documentation  Taken 1/27/2025 1200 by Marce Purvis RN  Safety Promotion/Fall Prevention: safety round/check completed  Taken 1/27/2025 1100 by Marce Purvis RN  Safety Promotion/Fall Prevention: safety round/check completed  Taken 1/27/2025 1000 by Marce Purvis RN  Safety Promotion/Fall Prevention: safety round/check completed  Taken 1/27/2025 0900 by Marce Purvis RN  Safety Promotion/Fall Prevention: safety round/check completed  Taken 1/27/2025 0800 by Marce Purvis RN  Safety Promotion/Fall Prevention: safety round/check completed  Taken 1/27/2025 0700 by Marce Purvis RN  Safety Promotion/Fall Prevention: safety round/check completed  Intervention: Prevent Skin Injury  Recent Flowsheet Documentation  Taken 1/27/2025 1200 by Marce Purvis RN  Body Position: supine  Taken 1/27/2025 1000 by Marce Purvis RN  Body Position:   right   turned  Taken 1/27/2025 0800 by Marce Purvis RN  Body Position:   left   turned  Intervention: Prevent and Manage VTE (Venous Thromboembolism) Risk  Recent Flowsheet Documentation  Taken 1/27/2025 0800 by Marce Purvis RN  VTE Prevention/Management: (MAR) other (see comments)  Intervention: Prevent Infection  Recent Flowsheet Documentation  Taken 1/27/2025 1200 by Marce Purvis RN  Infection Prevention: environmental surveillance performed  Taken 1/27/2025 1100 by Marce Purvis RN  Infection Prevention: environmental surveillance performed  Taken 1/27/2025 1000 by Marce Purvis RN  Infection Prevention: environmental surveillance performed  Taken 1/27/2025 0900 by Marce Purvis RN  Infection Prevention: hand hygiene promoted  Taken 1/27/2025 0800 by  Marce Purvis RN  Infection Prevention: hand hygiene promoted  Taken 1/27/2025 0700 by Marce Purvis RN  Infection Prevention: hand hygiene promoted  Goal: Optimal Comfort and Wellbeing  Outcome: Progressing  Intervention: Provide Person-Centered Care  Recent Flowsheet Documentation  Taken 1/27/2025 0800 by Marce Purvis RN  Trust Relationship/Rapport: care explained  Goal: Readiness for Transition of Care  Outcome: Progressing     Problem: Comorbidity Management  Goal: Blood Pressure in Desired Range  Outcome: Progressing  Intervention: Maintain Blood Pressure Management  Recent Flowsheet Documentation  Taken 1/27/2025 0800 by Marce Purvis RN  Medication Review/Management: medications reviewed     Problem: Fall Injury Risk  Goal: Absence of Fall and Fall-Related Injury  Outcome: Progressing  Intervention: Identify and Manage Contributors  Recent Flowsheet Documentation  Taken 1/27/2025 0800 by Marce Purvis RN  Medication Review/Management: medications reviewed  Intervention: Promote Injury-Free Environment  Recent Flowsheet Documentation  Taken 1/27/2025 1200 by Marce Purvis RN  Safety Promotion/Fall Prevention: safety round/check completed  Taken 1/27/2025 1100 by Marce Purvis RN  Safety Promotion/Fall Prevention: safety round/check completed  Taken 1/27/2025 1000 by Marce Purvis RN  Safety Promotion/Fall Prevention: safety round/check completed  Taken 1/27/2025 0900 by Marce Purvis RN  Safety Promotion/Fall Prevention: safety round/check completed  Taken 1/27/2025 0800 by Marce Purvis RN  Safety Promotion/Fall Prevention: safety round/check completed  Taken 1/27/2025 0700 by Marce Purvis RN  Safety Promotion/Fall Prevention: safety round/check completed     Problem: Restraint, Violent or Self-Destructive  Goal: Absence of Harm or Injury  Outcome: Progressing  Intervention: Implement Least Restrictive Safety Strategies  Recent Flowsheet Documentation  Taken 1/27/2025 1200  by Marce Purvis RN  De-Escalation Techniques: increased round frequency  Diversional Activities: television  Taken 1/27/2025 1000 by Marce Purvis RN  De-Escalation Techniques: increased round frequency  Diversional Activities: television  Taken 1/27/2025 0800 by Marce Purvis RN  De-Escalation Techniques: increased round frequency  Diversional Activities: television  Intervention: Protect Dignity, Rights and Personal Wellbeing  Recent Flowsheet Documentation  Taken 1/27/2025 0800 by Marce Purvis RN  Trust Relationship/Rapport: care explained  Intervention: Protect Skin and Joint Integrity  Recent Flowsheet Documentation  Taken 1/27/2025 1200 by Marce Purvis RN  Body Position: supine  Taken 1/27/2025 1000 by Marce Purvis RN  Body Position:   right   turned  Taken 1/27/2025 0800 by Marce Purvis RN  Body Position:   left   turned  Range of Motion: active ROM (range of motion) encouraged     Problem: Violence Risk or Actual  Goal: Anger and Impulse Control  Outcome: Progressing  Intervention: Minimize Safety Risk  Recent Flowsheet Documentation  Taken 1/27/2025 1200 by Marce Purvis RN  De-Escalation Techniques: increased round frequency  Enhanced Safety Measures: bed alarm set  Taken 1/27/2025 1100 by Marce Purvis RN  Enhanced Safety Measures: bed alarm set  Taken 1/27/2025 1000 by Marce Purvis RN  De-Escalation Techniques: increased round frequency  Enhanced Safety Measures: bed alarm set  Taken 1/27/2025 0900 by Marce Purvis RN  Enhanced Safety Measures: bed alarm set  Taken 1/27/2025 0800 by Marce Purvis RN  Behavior Management: boundaries reinforced  De-Escalation Techniques: increased round frequency  Enhanced Safety Measures: bed alarm set  Taken 1/27/2025 0700 by Marce Purvis RN  Enhanced Safety Measures: bed alarm set  Intervention: Promote Self-Control  Recent Flowsheet Documentation  Taken 1/27/2025 0800 by Marce Purvis RN  Supportive Measures: active  listening utilized  Environmental Support: calm environment promoted     Problem: Pain Acute  Goal: Optimal Pain Control and Function  Outcome: Progressing  Intervention: Optimize Psychosocial Wellbeing  Recent Flowsheet Documentation  Taken 1/27/2025 1200 by Marce Purvis RN  Diversional Activities: television  Taken 1/27/2025 1000 by Marce Purvis RN  Diversional Activities: television  Taken 1/27/2025 0800 by Marce Purvis RN  Supportive Measures: active listening utilized  Diversional Activities: television  Intervention: Prevent or Manage Pain  Recent Flowsheet Documentation  Taken 1/27/2025 0800 by Marce Purvis RN  Sleep/Rest Enhancement: awakenings minimized  Medication Review/Management: medications reviewed     Problem: Skin Injury Risk Increased  Goal: Skin Health and Integrity  Outcome: Progressing  Intervention: Optimize Skin Protection  Recent Flowsheet Documentation  Taken 1/27/2025 1200 by Marce Purvis RN  Activity Management: bedrest  Head of Bed (HOB) Positioning: HOB at 30 degrees  Taken 1/27/2025 1000 by Marce Purvis RN  Activity Management: bedrest  Head of Bed (HOB) Positioning: HOB at 30-45 degrees  Taken 1/27/2025 0800 by Marce Purvis RN  Activity Management: bedrest  Pressure Reduction Techniques: pressure points protected  Head of Bed (HOB) Positioning: HOB at 30-45 degrees  Pressure Reduction Devices: specialty bed utilized     Problem: Alcohol Withdrawal  Goal: Alcohol Withdrawal Symptom Control  Outcome: Progressing  Goal: Optimal Neurologic Function  Outcome: Progressing  Intervention: Minimize or Manage Acute Neurologic Symptoms  Recent Flowsheet Documentation  Taken 1/27/2025 0800 by Marce Purvis RN  Airway/Ventilation Management: airway patency maintained  Goal: Readiness for Change Identified  Outcome: Progressing  Intervention: Promote Psychosocial Wellbeing  Recent Flowsheet Documentation  Taken 1/27/2025 0800 by Marce Purvis RN  Family/Support  System Care: caregiver stress acknowledged  Intervention: Partner to Facilitate Behavior Change  Recent Flowsheet Documentation  Taken 1/27/2025 0800 by Marce Purvis RN  Supportive Measures: active listening utilized   Goal Outcome Evaluation: remain HDS, monitor vs/labs, Head CT, MRI sched, monitor mental status, CIWA q4h, keep calm.

## 2025-01-27 NOTE — PLAN OF CARE
Goal Outcome Evaluation:         Patient alert to self only. Does not remember being combative yesterday. Remains on precedex gtt. Receiving phenobarbital. See CIWA scoring and Mar for additional meds. Straight cath, 400 urine output. In wrist restraints and posey vest. Needs MRI but awaiting improvement in mental status.

## 2025-01-27 NOTE — CASE MANAGEMENT/SOCIAL WORK
Continued Stay Note  [facility]     Patient Name: Flako Coreas Sr.  MRN: [id number]  Today's Date: 1/27/2025    Admit Date: 1/25/2025        Discharge Plan       Row Name 01/27/25 1406       Plan    Plan Comments Attempted to screen unable to arouse.  Brittany CORONA RN CCP                   Discharge Codes    No documentation.                 Expected Discharge Date and Time       Expected Discharge Date Expected Discharge Time    Jan 27, 2025               Brittany Romero RN

## 2025-01-27 NOTE — PLAN OF CARE
Goal Outcome Evaluation: Patient admitted to CICU for CIWA protocol. Patient in 4 point violent restraints and sitter at bedside. Precedex gtt infusing. Versed given per protocol, see MAR. Patient remains combative and verbally aggressive. Will continue to monitor. Family at bedside.

## 2025-01-27 NOTE — CONSULTS
"Chap visited pt. Pt was in and out of sleep.  Pt is \"good with the Lord\" and thanked  for visiting.   remains available.  "

## 2025-01-27 NOTE — H&P
IDENTIFYING INFORMATION: The patient is a 59-year-old white male admitted following a syncopal episode.    CHIEF COMPLAINT: None given    INFORMANT: Chart patient does not awaken for interview    RELIABILITY: Limited    HISTORY OF PRESENT ILLNESS: The patient is a 59-year-old white male last seen by me in 2023 with complaints of anxiety.  He is currently on Viibryd for depression.  The patient reportedly has a history of alcohol abuse and has had some behavioral issues since admission, though when seen today he is sleeping soundly with no restraint devices in place.  He is currently on both phenobarbital and lorazepam based alcohol detoxification protocols.  For more complete history of present illness please refer to previous dictated notes    PAST PSYCHIATRIC HISTORY: Reviewed no changes    PAST MEDICAL HISTORY: Reviewed no changes    MEDICATIONS:   Current Facility-Administered Medications   Medication Dose Route Frequency Provider Last Rate Last Admin    acetaminophen (TYLENOL) tablet 650 mg  650 mg Oral Q4H PRN Ester Bright MD        Or    acetaminophen (TYLENOL) 160 MG/5ML oral solution 650 mg  650 mg Oral Q4H PRN Ester Bright MD        Or    acetaminophen (TYLENOL) suppository 650 mg  650 mg Rectal Q4H PRN Ester Bright MD        amLODIPine (NORVASC) tablet 10 mg  10 mg Oral Daily Ester Bright MD   10 mg at 01/27/25 0820    aspirin chewable tablet 81 mg  81 mg Oral Daily Ester Bright MD   81 mg at 01/27/25 0821    atorvastatin (LIPITOR) tablet 40 mg  40 mg Oral Nightly Ester Bright MD   40 mg at 01/25/25 2141    sennosides-docusate (PERICOLACE) 8.6-50 MG per tablet 2 tablet  2 tablet Oral BID Ester Bright MD   2 tablet at 01/26/25 0903    And    polyethylene glycol (MIRALAX) packet 17 g  17 g Oral Daily PRN Ester Bright MD        And    bisacodyl (DULCOLAX) EC tablet 5 mg  5 mg Oral Daily PRN Ester Bright MD        And    bisacodyl (DULCOLAX) suppository 10 mg  10  mg Rectal Daily PRN Ester Bright MD        Calcium Replacement - Follow Nurse / BPA Driven Protocol   Not Applicable PRN Ester Bright MD        cloNIDine (CATAPRES) tablet 0.1 mg  0.1 mg Oral Q4H PRN Jairon Fischer MD        dexmedetomidine (PRECEDEX) 400 mcg in 100 mL NS infusion  0.2-1.5 mcg/kg/hr Intravenous Titrated Jairon Fischer MD 15.3 mL/hr at 01/27/25 1235 0.6 mcg/kg/hr at 01/27/25 1235    diazePAM (VALIUM) tablet 10 mg  10 mg Oral Q2H PRN Pool Nevarez DO        Or    diazePAM (VALIUM) injection 10 mg  10 mg Intravenous Q2H PRN Pool Nevarez DO        Or    diazePAM (VALIUM) tablet 15 mg  15 mg Oral Q2H PRN Pool Nevarze, DO        Or    diazePAM (VALIUM) injection 15 mg  15 mg Intravenous Q2H PRN Pool Nevarez DO        Or    diazePAM (VALIUM) tablet 20 mg  20 mg Oral Q1H PRN Pool Nevarez,         Or    diazePAM (VALIUM) injection 20 mg  20 mg Intravenous Q1H PRN Pool Nevarez DO        diazePAM (VALIUM) injection 2.5 mg  2.5 mg Intravenous Q6H Jairon Fischer MD   2.5 mg at 01/27/25 1108    folic acid 1 mg in sodium chloride 0.9 % 50 mL IVPB  1 mg Intravenous Daily Pool Nevarez  mL/hr at 01/27/25 1108 1 mg at 01/27/25 1108    hydrALAZINE (APRESOLINE) injection 10 mg  10 mg Intravenous Q6H PRN Ester Bright MD        lisinopril (PRINIVIL,ZESTRIL) tablet 40 mg  40 mg Oral Daily Ester Bright MD   40 mg at 01/27/25 0821    LORazepam (ATIVAN) tablet 1 mg  1 mg Oral Q6H Ester Bright MD   1 mg at 01/27/25 1108    Followed by    LORazepam (ATIVAN) tablet 1 mg  1 mg Oral Q12H Ester Bright MD        Followed by    [START ON 1/29/2025] LORazepam (ATIVAN) tablet 1 mg  1 mg Oral Daily Ester Bright MD        Magnesium Standard Dose Replacement - Follow Nurse / BPA Driven Protocol   Not Applicable PRN Ester Bright MD        melatonin tablet 5 mg  5 mg Oral Nightly PRN Ester Bright,  MD        metoprolol tartrate (LOPRESSOR) tablet 50 mg  50 mg Oral Q12H Ester Bright MD   50 mg at 01/26/25 0903    multivitamin with minerals 1 tablet  1 tablet Oral Daily Ester Bright MD   1 tablet at 01/27/25 0822    mupirocin (BACTROBAN) 2 % nasal ointment 1 Application  1 Application Each Nare BID Teddy Ma MD   1 Application at 01/27/25 0821    nitroglycerin (NITROSTAT) SL tablet 0.4 mg  0.4 mg Sublingual Q5 Min PRN Ester Bright MD        ondansetron ODT (ZOFRAN-ODT) disintegrating tablet 4 mg  4 mg Oral Q6H PRN Ester Bright MD        Or    ondansetron (ZOFRAN) injection 4 mg  4 mg Intravenous Q6H PRN Ester Bright MD        [START ON 1/28/2025] pantoprazole (PROTONIX) EC tablet 40 mg  40 mg Oral Q AM Ester Bright MD        PHENobarbital injection 65 mg  65 mg Intravenous Once Brandon Cifuentes MD        Followed by    [START ON 1/28/2025] PHENobarbital injection 65 mg  65 mg Intravenous Once Brandon Cifuentes MD        Followed by    [START ON 1/28/2025] PHENobarbital tablet 32.4 mg  32.4 mg Oral Once Brandon Cifuentes MD        Followed by    [START ON 1/29/2025] PHENobarbital tablet 32.4 mg  32.4 mg Oral Once Brandon Cifuentes MD        Followed by    [START ON 1/29/2025] PHENobarbital tablet 32.4 mg  32.4 mg Oral Once Brandon Cifuentes MD        Phosphorus Replacement - Follow Nurse / BPA Driven Protocol   Not Applicable Ester Martinez MD        Potassium Replacement - Follow Nurse / BPA Driven Protocol   Not Applicable Ester Martinez MD        sodium chloride 0.9 % flush 10 mL  10 mL Intravenous Q12H Ester Bright MD   10 mL at 01/27/25 0820    sodium chloride 0.9 % flush 10 mL  10 mL Intravenous PRN Ester Bright MD        sodium chloride 0.9 % infusion 40 mL  40 mL Intravenous PRN Ester Bright MD        thiamine (B-1) injection 200 mg  200 mg Intravenous Q8H Ester Bright MD   200 mg at 01/27/25 0518    Followed by     [START ON 1/31/2025] thiamine (VITAMIN B-1) tablet 100 mg  100 mg Oral Daily Ester Bright MD             ALLERGIES: None    FAMILY HISTORY: Reviewed no changes    SOCIAL HISTORY: Reviewed no changes    MENTAL STATUS EXAM: The patient is a soundly sleeping white male.  Multiple attempts to awaken him are unsuccessful.  No restraint devices are in place.    ASSETS/LIABILITIES: To be assessed    DIAGNOSTIC IMPRESSION: Alcohol use disorder, depressive disorder unspecified, medical problems as previously documented    PLAN: The patient is currently sleeping soundly and is presenting no management issues.  I would recommend continuation of alcohol detox protocol and will continue to follow with you.

## 2025-01-27 NOTE — NURSING NOTE
"Patient arrived from 6S accompanied by security guards and . In four point restraints. Transferred from floor bed to CICU bed. Pt states he is unable to breathe and is tachypnic. We helped him to sit up in bed. Pt angry and paranoid, stating \"why are you laughing at me instead of helping me\" and accusing security staff and RNs of assaulting him, despite no one touching him at the time. Pt cursing at staff members. Every time another staff member came in room, he accused them of being the one that refused to help him when the security staff was holding him down. Refusing to accept basic care, including investigation into his \"shortness of air\" via chest xray, 12-lead, etc. He acuses staff of terrorizing him and requests to talk to police and whoever is in charge of this place. Patient's son and brother to bedside and were able to help calm him. Throughout all of this, pt continues to have aggressive body language, raised voice, and pull at restraints.       "

## 2025-01-27 NOTE — PROGRESS NOTES
[facility]  [phone]  Dr. Pool Nevarez     Subjective:  LOS: 1    Chief Complaint:  Increasing agitation and alcohol withdrawal     Patient was seen relatively calm and sleeping.  He did awaken easily.  But was confused.  I spoke with his friend at bedside who is skeptical of the alcohol withdrawal diagnosis.  As patient did not have any alcohol in his home and neither he nor the son have noticed any heavy drinking recently.  He did admit that the patient has previously been a heavy drinker but they do not believe this to be the case currently.    Objective   Vital Signs past 24hrs  Temp range: Temp (24hrs), Av.7 °F (35.9 °C), Min:95 °F (35 °C), Max:98.2 °F (36.8 °C)    BP range: BP: ()/() 104/61  Pulse range: Heart Rate:  [45-75] 51  Resp rate range: Resp:  [24] 24  Device (Oxygen Therapy): room air   Oxygen range:SpO2:  [91 %-99 %] 98 %     Physical Exam  Vitals and nursing note reviewed.   Constitutional:       General: He is sleeping. He is not in acute distress.  HENT:      Head: Normocephalic and atraumatic.   Cardiovascular:      Rate and Rhythm: Normal rate and regular rhythm.      Heart sounds: No murmur heard.  Pulmonary:      Effort: Pulmonary effort is normal. No respiratory distress.      Breath sounds: Normal breath sounds. No wheezing, rhonchi or rales.   Abdominal:      General: Abdomen is flat.      Tenderness: There is no abdominal tenderness.   Skin:     General: Skin is warm and dry.      Findings: No rash.   Neurological:      Mental Status: He is lethargic.       Results Review:    I have reviewed the laboratory and imaging data since the last note by [facility] physician.  My annotations are noted in assessment and plan.      Result Review:  I have personally reviewed the results from last note by [facility] physician to 2025 17:13 EST and agree with these findings:  [x]  Laboratory list / accordion  [x]  Microbiology  [x]  Radiology  [x]  EKG/Telemetry   [x]   Cardiology/Vascular   [x]  Pathology  [x]  Old records  []  Other:    Medication Review:  I have reviewed the current MAR.  My annotations are noted in assessment and plan.    amLODIPine, 10 mg, Oral, Daily  aspirin, 81 mg, Oral, Daily  atorvastatin, 40 mg, Oral, Nightly  diazePAM, 2.5 mg, Intravenous, Q6H  folic acid 1 mg in sodium chloride 0.9 % 50 mL IVPB, 1 mg, Intravenous, Daily  lisinopril, 40 mg, Oral, Daily  LORazepam, 1 mg, Oral, Q6H   Followed by  LORazepam, 1 mg, Oral, Q12H   Followed by  [START ON 1/29/2025] LORazepam, 1 mg, Oral, Daily  metoprolol tartrate, 50 mg, Oral, Q12H  multivitamin with minerals, 1 tablet, Oral, Daily  mupirocin, 1 Application, Each Nare, BID  [START ON 1/28/2025] PHENobarbital, 65 mg, Intravenous, Once   Followed by  [START ON 1/28/2025] PHENobarbital, 32.4 mg, Oral, Once   Followed by  [START ON 1/29/2025] PHENobarbital, 32.4 mg, Oral, Once   Followed by  [START ON 1/29/2025] PHENobarbital, 32.4 mg, Oral, Once  senna-docusate sodium, 2 tablet, Oral, BID  thiamine (B-1) IV, 200 mg, Intravenous, Q8H   Followed by  [START ON 1/31/2025] thiamine, 100 mg, Oral, Daily        dexmedetomidine, 0.2-1.5 mcg/kg/hr, Last Rate: Stopped (01/27/25 1710)  pantoprazole, 8 mg/hr      Lines, Drains & Airways       Active LDAs       Name Placement date Placement time Site Days    Peripheral IV 01/25/25 0930 Left Antecubital 01/25/25  0930  Antecubital  2    Peripheral IV 01/26/25 1738 Anterior;Right Forearm 01/26/25  1738  Forearm  less than 1                  No active isolations  Diet Orders (active) (From admission, onward)       Start     Ordered    01/25/25 1624  Diet: Cardiac; Healthy Heart (2-3 Na+); Fluid Consistency: Thin (IDDSI 0)  Diet Effective Now         01/25/25 1623                      Assessment  Encephalopathy  Alcohol withdrawal with worsening agitation and combativeness  Syncope  Hypertensive emergency  Mood disorder  History of atrial fibrillation  Gastrosoft reflux disease  with esophagitis and Lepe's esophagus  Diverticulosis    Plan  -Patient presenting with syncope and prior history of heavy alcohol use.  Was found to have an alcohol level of 0.  Has been managed his alcohol withdrawal on the floor.  Brought to ICU due to significant agitation and aggressive behavior  - Behavioral health consulted  - Continue Precedex and withdrawal taper  - Monitor blood pressure  - Given family and friends report that he was no longer drinking heavily, will investigate other causes for his encephalopathy and agitation.  CTA head and neck to be obtained today.  May need MRI.        THESE ARE NEW MEDICAL PROBLEMS TO ME.      Pool Nevarez DO   01/27/25  17:13 EST      Part of this note may be an electronic transcription/translation of spoken language to printed text using the Dragon Dictation System.

## 2025-01-27 NOTE — PROGRESS NOTES
LOS: 1 day   Patient Care Team:  Akash Rodriguez Jr., DO as PCP - General (Sports Medicine)    Chief Complaint: Follow-up syncope, labile blood pressure.    Interval History: Blood pressure is now low.  He felt fairly fatigued this morning.  No chest pain or shortness of breath.    Vital Signs:  Temp:  [95 °F (35 °C)-98.2 °F (36.8 °C)] 98.2 °F (36.8 °C)  Heart Rate:  [45-80] 51  Resp:  [24] 24  BP: ()/() 104/61    Intake/Output Summary (Last 24 hours) at 1/27/2025 1401  Last data filed at 1/27/2025 1200  Gross per 24 hour   Intake 1870.68 ml   Output 400 ml   Net 1470.68 ml       Physical Exam:   General Appearance:    No acute distress, somnolent but arouses appropriately.   Lungs:     Clear to auscultation bilaterally     Heart:    Regular rhythm and normal rate.  No murmurs, gallops, or  rubs.   Abdomen:     Soft, nontender, nondistended.    Extremities:   No clubbing, cyanosis, or edema.     Results Review:    Results from last 7 days   Lab Units 01/27/25  0323   SODIUM mmol/L 139   POTASSIUM mmol/L 3.7   CHLORIDE mmol/L 108*   CO2 mmol/L 21.0*   BUN mg/dL 15   CREATININE mg/dL 0.85   GLUCOSE mg/dL 113*   CALCIUM mg/dL 7.9*     Results from last 7 days   Lab Units 01/26/25  0609 01/25/25  1034 01/25/25  0928   HSTROP T ng/L <6 <6 <6     Results from last 7 days   Lab Units 01/27/25  0323   WBC 10*3/mm3 6.91   HEMOGLOBIN g/dL 14.0   HEMATOCRIT % 42.4   PLATELETS 10*3/mm3 201         Results from last 7 days   Lab Units 01/26/25  0609   CHOLESTEROL mg/dL 163     Results from last 7 days   Lab Units 01/25/25  0928   MAGNESIUM mg/dL 2.1     Results from last 7 days   Lab Units 01/26/25  0609   CHOLESTEROL mg/dL 163   TRIGLYCERIDES mg/dL 154*   HDL CHOL mg/dL 33*   LDL CHOL mg/dL 103*       I reviewed the patient's new clinical results.        Assessment:  1.  Syncope of uncertain etiology  2.  Severe hypertension with hypertensive emergency on admission  3.  History of left cerebellar CVA in  August 2023  4.  GERD with a history of Lepe's esophagus  5.  Alcohol use with current alcohol withdrawal  6.  Paroxysmal atrial fibrillation  7.  Status post loop recorder placement January 2024    Plan:  -It appears currently that he may be going through alcohol withdrawal.  Psychiatry has been consulted.  He is currently being treated appropriately for this in the CICU.    -Loop recorder interrogation showed 1 isolated brief episode of what appears to be atrial flutter on 1/8/2025.  Otherwise, no events.  There were no events to explain his syncopal episode.    -His blood pressure is actually now low.  This makes me wonder if he has been compliant with his home regimen at home versus whether alcohol may be playing a very large role in his blood pressure management.  He has amlodipine, lisinopril, and metoprolol ordered with hold parameters.  He also has been mildly bradycardic at times.    -Eliquis has been stopped because of frequent falls and alcohol use.  It also evidently was cost prohibitive to the patient in the past.    Carlos Foster MD  01/27/25  14:01 EST

## 2025-01-28 ENCOUNTER — APPOINTMENT (OUTPATIENT)
Dept: MRI IMAGING | Facility: HOSPITAL | Age: 60
DRG: 312 | End: 2025-01-28

## 2025-01-28 ENCOUNTER — APPOINTMENT (OUTPATIENT)
Dept: GENERAL RADIOLOGY | Facility: HOSPITAL | Age: 60
DRG: 312 | End: 2025-01-28

## 2025-01-28 LAB
ALBUMIN SERPL-MCNC: 3.4 G/DL (ref 3.5–5.2)
ALP SERPL-CCNC: 65 U/L (ref 39–117)
ALT SERPL W P-5'-P-CCNC: 20 U/L (ref 1–41)
AMMONIA BLD-SCNC: 16 UMOL/L (ref 16–60)
ANION GAP SERPL CALCULATED.3IONS-SCNC: 9.7 MMOL/L (ref 5–15)
AST SERPL-CCNC: 17 U/L (ref 1–40)
BASOPHILS # BLD AUTO: 0.03 10*3/MM3 (ref 0–0.2)
BASOPHILS NFR BLD AUTO: 0.3 % (ref 0–1.5)
BILIRUB CONJ SERPL-MCNC: <0.2 MG/DL (ref 0–0.3)
BILIRUB INDIRECT SERPL-MCNC: ABNORMAL MG/DL
BILIRUB SERPL-MCNC: 0.8 MG/DL (ref 0–1.2)
BUN SERPL-MCNC: 12 MG/DL (ref 6–20)
BUN/CREAT SERPL: 14.8 (ref 7–25)
CALCIUM SPEC-SCNC: 8 MG/DL (ref 8.6–10.5)
CHLORIDE SERPL-SCNC: 108 MMOL/L (ref 98–107)
CO2 SERPL-SCNC: 19.3 MMOL/L (ref 22–29)
CREAT SERPL-MCNC: 0.81 MG/DL (ref 0.76–1.27)
DEPRECATED RDW RBC AUTO: 42 FL (ref 37–54)
EGFRCR SERPLBLD CKD-EPI 2021: 102.8 ML/MIN/1.73
EOSINOPHIL # BLD AUTO: 0.58 10*3/MM3 (ref 0–0.4)
EOSINOPHIL NFR BLD AUTO: 5.2 % (ref 0.3–6.2)
ERYTHROCYTE [DISTWIDTH] IN BLOOD BY AUTOMATED COUNT: 13.1 % (ref 12.3–15.4)
GLUCOSE SERPL-MCNC: 109 MG/DL (ref 65–99)
HCT VFR BLD AUTO: 41.4 % (ref 37.5–51)
HGB BLD-MCNC: 13.2 G/DL (ref 13–17.7)
IMM GRANULOCYTES # BLD AUTO: 0.03 10*3/MM3 (ref 0–0.05)
IMM GRANULOCYTES NFR BLD AUTO: 0.3 % (ref 0–0.5)
LYMPHOCYTES # BLD AUTO: 1.35 10*3/MM3 (ref 0.7–3.1)
LYMPHOCYTES NFR BLD AUTO: 12.1 % (ref 19.6–45.3)
MCH RBC QN AUTO: 27.8 PG (ref 26.6–33)
MCHC RBC AUTO-ENTMCNC: 31.9 G/DL (ref 31.5–35.7)
MCV RBC AUTO: 87.3 FL (ref 79–97)
MONOCYTES # BLD AUTO: 1.01 10*3/MM3 (ref 0.1–0.9)
MONOCYTES NFR BLD AUTO: 9 % (ref 5–12)
NEUTROPHILS NFR BLD AUTO: 73.1 % (ref 42.7–76)
NEUTROPHILS NFR BLD AUTO: 8.18 10*3/MM3 (ref 1.7–7)
NRBC BLD AUTO-RTO: 0 /100 WBC (ref 0–0.2)
PLATELET # BLD AUTO: 189 10*3/MM3 (ref 140–450)
PMV BLD AUTO: 10.1 FL (ref 6–12)
POTASSIUM SERPL-SCNC: 3.4 MMOL/L (ref 3.5–5.2)
POTASSIUM SERPL-SCNC: 3.6 MMOL/L (ref 3.5–5.2)
PROT SERPL-MCNC: 5.9 G/DL (ref 6–8.5)
RBC # BLD AUTO: 4.74 10*6/MM3 (ref 4.14–5.8)
SODIUM SERPL-SCNC: 137 MMOL/L (ref 136–145)
TSH SERPL DL<=0.05 MIU/L-ACNC: 1.52 UIU/ML (ref 0.27–4.2)
VIT B12 BLD-MCNC: 568 PG/ML (ref 211–946)
WBC NRBC COR # BLD AUTO: 11.18 10*3/MM3 (ref 3.4–10.8)

## 2025-01-28 PROCEDURE — 73090 X-RAY EXAM OF FOREARM: CPT

## 2025-01-28 PROCEDURE — 25010000002 THIAMINE HCL 200 MG/2ML SOLUTION: Performed by: INTERNAL MEDICINE

## 2025-01-28 PROCEDURE — 25010000002 PHENOBARBITAL PER 120 MG: Performed by: INTERNAL MEDICINE

## 2025-01-28 PROCEDURE — 99222 1ST HOSP IP/OBS MODERATE 55: CPT | Performed by: INTERNAL MEDICINE

## 2025-01-28 PROCEDURE — 99232 SBSQ HOSP IP/OBS MODERATE 35: CPT | Performed by: INTERNAL MEDICINE

## 2025-01-28 PROCEDURE — 25010000002 THIAMINE PER 100 MG: Performed by: INTERNAL MEDICINE

## 2025-01-28 PROCEDURE — 70553 MRI BRAIN STEM W/O & W/DYE: CPT

## 2025-01-28 PROCEDURE — 25010000002 DIAZEPAM PER 5 MG: Performed by: STUDENT IN AN ORGANIZED HEALTH CARE EDUCATION/TRAINING PROGRAM

## 2025-01-28 PROCEDURE — 25510000002 GADOBENATE DIMEGLUMINE 529 MG/ML SOLUTION: Performed by: HOSPITALIST

## 2025-01-28 PROCEDURE — 73060 X-RAY EXAM OF HUMERUS: CPT

## 2025-01-28 PROCEDURE — 25010000002 MIDAZOLAM PER 1 MG: Performed by: INTERNAL MEDICINE

## 2025-01-28 PROCEDURE — 25010000002 POTASSIUM CHLORIDE 10 MEQ/100ML SOLUTION: Performed by: HOSPITALIST

## 2025-01-28 PROCEDURE — 84443 ASSAY THYROID STIM HORMONE: CPT | Performed by: PSYCHIATRY & NEUROLOGY

## 2025-01-28 PROCEDURE — 82607 VITAMIN B-12: CPT | Performed by: PSYCHIATRY & NEUROLOGY

## 2025-01-28 PROCEDURE — 80048 BASIC METABOLIC PNL TOTAL CA: CPT | Performed by: INTERNAL MEDICINE

## 2025-01-28 PROCEDURE — 80076 HEPATIC FUNCTION PANEL: CPT | Performed by: STUDENT IN AN ORGANIZED HEALTH CARE EDUCATION/TRAINING PROGRAM

## 2025-01-28 PROCEDURE — 84132 ASSAY OF SERUM POTASSIUM: CPT | Performed by: HOSPITALIST

## 2025-01-28 PROCEDURE — 82140 ASSAY OF AMMONIA: CPT | Performed by: PSYCHIATRY & NEUROLOGY

## 2025-01-28 PROCEDURE — 85025 COMPLETE CBC W/AUTO DIFF WBC: CPT | Performed by: INTERNAL MEDICINE

## 2025-01-28 PROCEDURE — 25010000002 DIAZEPAM PER 5 MG: Performed by: INTERNAL MEDICINE

## 2025-01-28 PROCEDURE — 73120 X-RAY EXAM OF HAND: CPT

## 2025-01-28 PROCEDURE — A9577 INJ MULTIHANCE: HCPCS | Performed by: HOSPITALIST

## 2025-01-28 PROCEDURE — 99222 1ST HOSP IP/OBS MODERATE 55: CPT | Performed by: PSYCHIATRY & NEUROLOGY

## 2025-01-28 PROCEDURE — 73030 X-RAY EXAM OF SHOULDER: CPT

## 2025-01-28 RX ORDER — DIAZEPAM 5 MG/1
15 TABLET ORAL
Status: DISCONTINUED | OUTPATIENT
Start: 2025-01-28 | End: 2025-02-01

## 2025-01-28 RX ORDER — DIAZEPAM 10 MG/2ML
20 INJECTION, SOLUTION INTRAMUSCULAR; INTRAVENOUS
Status: DISCONTINUED | OUTPATIENT
Start: 2025-01-28 | End: 2025-02-01

## 2025-01-28 RX ORDER — DIAZEPAM 10 MG/2ML
15 INJECTION, SOLUTION INTRAMUSCULAR; INTRAVENOUS
Status: DISCONTINUED | OUTPATIENT
Start: 2025-01-28 | End: 2025-02-01

## 2025-01-28 RX ORDER — PANTOPRAZOLE SODIUM 40 MG/1
40 TABLET, DELAYED RELEASE ORAL
Status: DISCONTINUED | OUTPATIENT
Start: 2025-01-29 | End: 2025-02-02 | Stop reason: HOSPADM

## 2025-01-28 RX ORDER — DIAZEPAM 10 MG/2ML
2.5 INJECTION, SOLUTION INTRAMUSCULAR; INTRAVENOUS EVERY 6 HOURS
Status: DISCONTINUED | OUTPATIENT
Start: 2025-01-28 | End: 2025-01-29

## 2025-01-28 RX ORDER — DIAZEPAM 5 MG/1
20 TABLET ORAL
Status: DISCONTINUED | OUTPATIENT
Start: 2025-01-28 | End: 2025-02-01

## 2025-01-28 RX ORDER — HALOPERIDOL 5 MG/ML
5 INJECTION INTRAMUSCULAR EVERY 6 HOURS PRN
Status: DISCONTINUED | OUTPATIENT
Start: 2025-01-28 | End: 2025-01-29

## 2025-01-28 RX ORDER — DIAZEPAM 10 MG/2ML
10 INJECTION, SOLUTION INTRAMUSCULAR; INTRAVENOUS
Status: DISCONTINUED | OUTPATIENT
Start: 2025-01-28 | End: 2025-02-01

## 2025-01-28 RX ORDER — MIDAZOLAM HYDROCHLORIDE 1 MG/ML
INJECTION, SOLUTION INTRAMUSCULAR; INTRAVENOUS
Status: ACTIVE
Start: 2025-01-28 | End: 2025-01-29

## 2025-01-28 RX ORDER — LORAZEPAM 1 MG/1
2 TABLET ORAL EVERY 4 HOURS PRN
Status: DISCONTINUED | OUTPATIENT
Start: 2025-01-28 | End: 2025-01-28

## 2025-01-28 RX ORDER — POTASSIUM CHLORIDE 1.5 G/1.58G
40 POWDER, FOR SOLUTION ORAL EVERY 4 HOURS
Status: COMPLETED | OUTPATIENT
Start: 2025-01-28 | End: 2025-01-28

## 2025-01-28 RX ORDER — POTASSIUM CHLORIDE 7.45 MG/ML
10 INJECTION INTRAVENOUS
Status: COMPLETED | OUTPATIENT
Start: 2025-01-28 | End: 2025-01-28

## 2025-01-28 RX ORDER — DIAZEPAM 5 MG/1
10 TABLET ORAL
Status: DISCONTINUED | OUTPATIENT
Start: 2025-01-28 | End: 2025-02-01

## 2025-01-28 RX ADMIN — AMLODIPINE BESYLATE 10 MG: 10 TABLET ORAL at 09:04

## 2025-01-28 RX ADMIN — MIDAZOLAM 4 MG: 1 INJECTION INTRAMUSCULAR; INTRAVENOUS at 07:26

## 2025-01-28 RX ADMIN — SODIUM CHLORIDE 8 MG/HR: 900 INJECTION INTRAVENOUS at 10:36

## 2025-01-28 RX ADMIN — DIAZEPAM 2.5 MG: 10 INJECTION, SOLUTION INTRAMUSCULAR; INTRAVENOUS at 05:15

## 2025-01-28 RX ADMIN — MIDAZOLAM 4 MG: 1 INJECTION INTRAMUSCULAR; INTRAVENOUS at 00:42

## 2025-01-28 RX ADMIN — ATORVASTATIN CALCIUM 40 MG: 20 TABLET, FILM COATED ORAL at 20:55

## 2025-01-28 RX ADMIN — DIAZEPAM 15 MG: 5 INJECTION INTRAMUSCULAR; INTRAVENOUS at 23:57

## 2025-01-28 RX ADMIN — DEXMEDETOMIDINE HYDROCHLORIDE IN SODIUM CHLORIDE 1.5 MCG/KG/HR: 4 INJECTION INTRAVENOUS at 00:01

## 2025-01-28 RX ADMIN — DEXMEDETOMIDINE HYDROCHLORIDE IN SODIUM CHLORIDE 1.5 MCG/KG/HR: 4 INJECTION INTRAVENOUS at 22:04

## 2025-01-28 RX ADMIN — POTASSIUM CHLORIDE 10 MEQ: 7.46 INJECTION, SOLUTION INTRAVENOUS at 07:44

## 2025-01-28 RX ADMIN — THIAMINE HYDROCHLORIDE 200 MG: 100 INJECTION, SOLUTION INTRAMUSCULAR; INTRAVENOUS at 05:14

## 2025-01-28 RX ADMIN — FOLIC ACID 1 MG: 5 INJECTION, SOLUTION INTRAMUSCULAR; INTRAVENOUS; SUBCUTANEOUS at 09:08

## 2025-01-28 RX ADMIN — PHENOBARBITAL SODIUM 65 MG: 65 INJECTION INTRAMUSCULAR at 02:37

## 2025-01-28 RX ADMIN — DIAZEPAM 2.5 MG: 10 INJECTION, SOLUTION INTRAMUSCULAR; INTRAVENOUS at 11:19

## 2025-01-28 RX ADMIN — MUPIROCIN 1 APPLICATION: 20 OINTMENT TOPICAL at 09:06

## 2025-01-28 RX ADMIN — MUPIROCIN 1 APPLICATION: 20 OINTMENT TOPICAL at 20:55

## 2025-01-28 RX ADMIN — PHENOBARBITAL 32.4 MG: 32.4 TABLET ORAL at 15:46

## 2025-01-28 RX ADMIN — MIDAZOLAM 4 MG: 1 INJECTION INTRAMUSCULAR; INTRAVENOUS at 10:36

## 2025-01-28 RX ADMIN — SENNOSIDES AND DOCUSATE SODIUM 2 TABLET: 50; 8.6 TABLET ORAL at 20:55

## 2025-01-28 RX ADMIN — MIDAZOLAM 4 MG: 1 INJECTION INTRAMUSCULAR; INTRAVENOUS at 08:11

## 2025-01-28 RX ADMIN — POTASSIUM CHLORIDE 10 MEQ: 7.46 INJECTION, SOLUTION INTRAVENOUS at 05:15

## 2025-01-28 RX ADMIN — LISINOPRIL 40 MG: 20 TABLET ORAL at 09:01

## 2025-01-28 RX ADMIN — MIDAZOLAM 4 MG: 1 INJECTION INTRAMUSCULAR; INTRAVENOUS at 09:13

## 2025-01-28 RX ADMIN — POTASSIUM CHLORIDE 10 MEQ: 7.46 INJECTION, SOLUTION INTRAVENOUS at 09:06

## 2025-01-28 RX ADMIN — DEXMEDETOMIDINE HYDROCHLORIDE IN SODIUM CHLORIDE 1.5 MCG/KG/HR: 4 INJECTION INTRAVENOUS at 08:17

## 2025-01-28 RX ADMIN — SODIUM CHLORIDE 8 MG/HR: 900 INJECTION INTRAVENOUS at 16:27

## 2025-01-28 RX ADMIN — DIAZEPAM 10 MG: 5 INJECTION INTRAMUSCULAR; INTRAVENOUS at 18:19

## 2025-01-28 RX ADMIN — DEXMEDETOMIDINE HYDROCHLORIDE IN SODIUM CHLORIDE 1.5 MCG/KG/HR: 4 INJECTION INTRAVENOUS at 02:37

## 2025-01-28 RX ADMIN — DEXMEDETOMIDINE HYDROCHLORIDE IN SODIUM CHLORIDE 1.5 MCG/KG/HR: 4 INJECTION INTRAVENOUS at 13:55

## 2025-01-28 RX ADMIN — GADOBENATE DIMEGLUMINE 20 ML: 529 INJECTION, SOLUTION INTRAVENOUS at 17:47

## 2025-01-28 RX ADMIN — ASPIRIN 81 MG CHEWABLE TABLET 81 MG: 81 TABLET CHEWABLE at 09:03

## 2025-01-28 RX ADMIN — POTASSIUM CHLORIDE 40 MEQ: 1.5 POWDER, FOR SOLUTION ORAL at 20:55

## 2025-01-28 RX ADMIN — MIDAZOLAM 4 MG: 1 INJECTION INTRAMUSCULAR; INTRAVENOUS at 04:02

## 2025-01-28 RX ADMIN — POTASSIUM CHLORIDE 40 MEQ: 1.5 POWDER, FOR SOLUTION ORAL at 18:19

## 2025-01-28 RX ADMIN — DEXMEDETOMIDINE HYDROCHLORIDE IN SODIUM CHLORIDE 1.5 MCG/KG/HR: 4 INJECTION INTRAVENOUS at 16:27

## 2025-01-28 RX ADMIN — MIDAZOLAM 4 MG: 1 INJECTION INTRAMUSCULAR; INTRAVENOUS at 05:15

## 2025-01-28 RX ADMIN — DEXMEDETOMIDINE HYDROCHLORIDE IN SODIUM CHLORIDE 1.5 MCG/KG/HR: 4 INJECTION INTRAVENOUS at 18:21

## 2025-01-28 RX ADMIN — THIAMINE HYDROCHLORIDE 200 MG: 100 INJECTION, SOLUTION INTRAMUSCULAR; INTRAVENOUS at 15:08

## 2025-01-28 RX ADMIN — DEXMEDETOMIDINE HYDROCHLORIDE IN SODIUM CHLORIDE 1.5 MCG/KG/HR: 4 INJECTION INTRAVENOUS at 05:45

## 2025-01-28 RX ADMIN — THIAMINE HYDROCHLORIDE 200 MG: 100 INJECTION, SOLUTION INTRAMUSCULAR; INTRAVENOUS at 21:00

## 2025-01-28 RX ADMIN — SODIUM CHLORIDE 8 MG/HR: 900 INJECTION INTRAVENOUS at 05:14

## 2025-01-28 RX ADMIN — POTASSIUM CHLORIDE 10 MEQ: 7.46 INJECTION, SOLUTION INTRAVENOUS at 06:34

## 2025-01-28 RX ADMIN — DEXMEDETOMIDINE HYDROCHLORIDE IN SODIUM CHLORIDE 1.5 MCG/KG/HR: 4 INJECTION INTRAVENOUS at 10:41

## 2025-01-28 NOTE — PROGRESS NOTES
"The patient awakens for interview today.  He continues to require soft wrist restraints and is notably angry and paranoid during today's interview, accusing his sitter of \"smirking at him\".  I believe the patient may be showing signs of sedative-hypnotic intoxication as his vital signs are fairly stable.  He is continuing to require Versed.  I have discontinued the Valium and have started the patient on Ativan to be given only for autonomic hyperactivity.  Additionally, for agitation I have ordered intramuscular haloperidol.  "

## 2025-01-28 NOTE — PLAN OF CARE
Goal Outcome Evaluation: Patient remains in CICU on 1L NC. VSS. Sitter at bedside. Medications given per [facility] protocol. Precedex gtt infusing. Protonix gtt switched to PO medication. Patient remains verbally aggressive and agitated at times. Neuro consult done, see note. Head MRI w/wo contrast done, results pending. Psych consult done, see note. Family at bedside and updated through phone calls.

## 2025-01-28 NOTE — CONSULTS
Neurology Consult Note    Referring Provider: Dr. Ma  Reason for Consultation: encephalopathy    History of present illness:    The patient is a 59 year old man admitted 1/25/2024 after an episode of syncope.     Patient has been seen in the past for syncope. He also has had 2 small left cerebellar strokes. He had a loop recorder placed January 2024 that showed a 13 second pause. Cardiology felt this was a vasovagal etiology.     Loop recorder did not see any events associated with the syncope on 1/25/2025.     He has been noted to have labile BP, initially very high, then very low.    He has afib but is not on Eliquis due to falls.     He developed severe agitation within first 24 hours, physical threatening staff, requiring 4 point restraints. He was thought to be in alcohol withdrawal but family and friends have indicated he no longer drinks heavily.    Brain MRI ordered 1/25/25 but not performed yet due to agitation.  CTA of head and neck was performed that was unremarkable.    He remains agitated and paranoid on Precedex, IV phenobarbital and IV  Valium.  He is also receiving IV thiamine.    Past Medical History  Past Medical History:   Diagnosis Date    ADHD (attention deficit hyperactivity disorder)     On going issue    Anxiety     Lepe's esophagus     Benign prostatic hyperplasia Surgery in 12/2021    Brain concussion 11/2023    Clotting disorder Intestinal    Depression     Diverticulitis     Diverticulosis     Duodenitis     Enteritis     Failure to thrive (child)     Family history of blood clots     Fracture of wrist 1985    Fracture, foot 2019    GERD (gastroesophageal reflux disease)     Hyperlipidemia     Hypertension     Hypertensive emergency     Irritable bowel syndrome 2017    Low testosterone     Microcytosis     Pancreatitis     Pneumonia     PONV (postoperative nausea and vomiting)     Seasonal affective disorder     Shoulder injury     Stroke     Unexplained weight loss     Visual  impairment 8/2023       Past Surgical History  Past Surgical History:   Procedure Laterality Date    CARDIAC ELECTROPHYSIOLOGY PROCEDURE N/A 01/24/2024    Procedure: Loop insertion- Medtronic LINQ;  Surgeon: Kaela Walker MD;  Location: [facility] CATH INVASIVE LOCATION;  Service: Cardiovascular;  Laterality: N/A;    COLON SURGERY      COLONOSCOPY      COLONOSCOPY N/A 12/11/2022    Procedure: COLONOSCOPY INTO CECUM WITH HOT SNARE POLYPECTOMY;  Surgeon: Albert Gallo MD;  [facility];  Service: Gastroenterology;  Laterality: N/A;  PRE- GI BLEED  POST- DIVERTICULOSIS, POLYP    COLONOSCOPY N/A 02/14/2024    Procedure: COLONOSCOPY into cecum/terminal ileum;  Surgeon: Albert Gallo MD;  [facility];  Service: Gastroenterology;  Laterality: N/A;  diverticulosis, hemorrhoids    ENDOSCOPY N/A 12/10/2022    Procedure: ESOPHAGOGASTRODUODENOSCOPY;  Surgeon: Albert Gallo MD;  [facility];  Service: Gastroenterology;  Laterality: N/A;  PRE- DARK STOOLS  POST- BARRETTS ESOPHAGUS    ENDOSCOPY N/A 02/14/2024    Procedure: ESOPHAGOGASTRODUODENOSCOPY with biopsy;  Surgeon: Albert Gallo MD;  Location: [facility] ENDOSCOPY;  Service: Gastroenterology;  Laterality: N/A;  long segment wilson's, hiatal hernia    FRACTURE SURGERY Right 1980    for broken arm    FRACTURE SURGERY Right     for broken hand    HAND SURGERY  1990    INCISION AND DRAINAGE ABSCESS  2015    anal    PROSTATE SURGERY      SHOULDER SURGERY  2022    SMALL INTESTINE SURGERY      TONSILLECTOMY      TRIGGER POINT INJECTION  2017     Family History  Family History   Problem Relation Age of Onset    Stroke Mother     Heart disease Mother     Stroke Father     Hyperlipidemia Father     Hypertension Father      No Known Allergies    Social History  Social History     Socioeconomic History    Marital status: Single   Tobacco Use    Smoking status: Never    Smokeless tobacco: Never   Vaping Use    Vaping status: Never Used    Substance and Sexual Activity    Alcohol use: Not Currently     Comment: 12 beers on the weekend    Drug use: Not Currently     Frequency: 1.0 times per week     Types: Marijuana     Comment: patient states he has been smoking marijuania daily x 2 weeks    Sexual activity: Not Currently     Partners: Female     Birth control/protection: Other, Birth control pill     Review of Systems   Unable to perform ROS: Mental status change     Medications  Scheduled Meds:amLODIPine, 10 mg, Oral, Daily  aspirin, 81 mg, Oral, Daily  atorvastatin, 40 mg, Oral, Nightly  diazePAM, 2.5 mg, Intravenous, Q6H  folic acid 1 mg in sodium chloride 0.9 % 50 mL IVPB, 1 mg, Intravenous, Daily  lisinopril, 40 mg, Oral, Daily  LORazepam, 1 mg, Oral, Q12H   Followed by  [START ON 1/29/2025] LORazepam, 1 mg, Oral, Daily  [Held by provider] metoprolol tartrate, 50 mg, Oral, Q12H  multivitamin with minerals, 1 tablet, Oral, Daily  mupirocin, 1 Application, Each Nare, BID  PHENobarbital, 32.4 mg, Oral, Once   Followed by  [START ON 1/29/2025] PHENobarbital, 32.4 mg, Oral, Once   Followed by  [START ON 1/29/2025] PHENobarbital, 32.4 mg, Oral, Once  senna-docusate sodium, 2 tablet, Oral, BID  thiamine (B-1) IV, 200 mg, Intravenous, Q8H   Followed by  [START ON 1/31/2025] thiamine, 100 mg, Oral, Daily      Continuous Infusions:dexmedetomidine, 0.2-1.5 mcg/kg/hr, Last Rate: 1.5 mcg/kg/hr (01/28/25 0849)  pantoprazole, 8 mg/hr, Last Rate: 8 mg/hr (01/28/25 0590)      PRN Meds:.  acetaminophen **OR** acetaminophen **OR** acetaminophen    senna-docusate sodium **AND** polyethylene glycol **AND** bisacodyl **AND** bisacodyl    Calcium Replacement - Follow Nurse / BPA Driven Protocol    cloNIDine    diazePAM **OR** diazePAM **OR** diazePAM **OR** diazePAM **OR** diazePAM **OR** diazePAM    hydrALAZINE    Magnesium Standard Dose Replacement - Follow Nurse / BPA Driven Protocol    melatonin    midazolam    nitroglycerin    ondansetron ODT **OR**  ondansetron    Phosphorus Replacement - Follow Nurse / BPA Driven Protocol    Potassium Replacement - Follow Nurse / BPA Driven Protocol    sodium chloride    sodium chloride    Vital Signs   Temp:  [97.9 °F (36.6 °C)-99.1 °F (37.3 °C)] 98.5 °F (36.9 °C)  Heart Rate:  [50-82] 56  Resp:  [20-24] 20  BP: ()/() 133/86    Examination:  Constitutional: Well-groomed, well-nourished  HENT:  normal  Eyes: Normal conjunctivae  CVS:  Regular rate and rhythm.  No murmurs.  Good peripheral perfusion.   Resp :   Nonlabored respirations  Musculoskeletal:  No signs of peripheral edema, normal range, no deformities  Skin:  No rash, normal turgor  Neurologic:    Alert oriented to name and year, not to situation or place  Poor attention, able to follow some one step commands, very distractible  Face symmetric  No dysarthria  EOMF without nystagmus  Pupils symmetric and equally reactive  Face symmetric  Moves all extremities well  No rigidity or tremor  Reflexes symmetric and not hyperactive  Plantar responses flexor  Gait not tested    Psychiatric:   Tearful, anxious, paranoid    Results Review:  Results from last 7 days   Lab Units 01/28/25  0347 01/27/25  1653 01/27/25  0323 01/26/25  0609   WBC 10*3/mm3 11.18*  --  6.91 6.06   HEMOGLOBIN g/dL 13.2 14.0 14.0 14.7   HEMATOCRIT % 41.4 41.3 42.4 43.9   PLATELETS 10*3/mm3 189  --  201 223        Results from last 7 days   Lab Units 01/28/25  0347 01/27/25  0323 01/26/25  0609 01/25/25  0928   SODIUM mmol/L 137 139 139 140   POTASSIUM mmol/L 3.4* 3.7 3.9 4.1   CHLORIDE mmol/L 108* 108* 106 103   CO2 mmol/L 19.3* 21.0* 23.0 23.4   BUN mg/dL 12 15 14 12   CREATININE mg/dL 0.81 0.85 0.92 0.82   CALCIUM mg/dL 8.0* 7.9* 8.4* 9.0   BILIRUBIN mg/dL  --   --   --  0.5   ALK PHOS U/L  --   --   --  72   ALT (SGPT) U/L  --   --   --  31   AST (SGOT) U/L  --   --   --  22   GLUCOSE mg/dL 109* 113* 119* 95      Radiology  Head CT showed no acute pathology   Images reviewed  independently    CTA head and neck showed no significant abnormalities.    Medical Decision Making and Recommendations  Psychosis  Persisting despite alcohol withdrawal protocol    Family and friends have indicated that he is not drinking heavily at this time.    Other etiologies to consider include:  stroke, PRES, paraneoplastic or autoimmune etiology, Wernicke's.    Doubt CNS infection as afebrile on admission. Vasculitis unlikely with normal CTA.     Consider vilazodone withdrawal but seems too severe     Check B12, TSH and ammonia levels.    Proceed with brain MRI    Continue thiamine.    I discussed these findings and my recommendations with nursing staff, primary care team, and consulting provider        Stacey Borges MD  01/28/25  10:32 EST

## 2025-01-28 NOTE — CONSULTS
[facility]  Initial Inpatient Consult Note    Referring Provider: Dr Nevarez    Reason for Consultation: Rectal bleeding    Subjective     History of present illness:    59 y.o. male, patient of Dr. Albert Gallo that we are asked asked to see for rectal bleeding.    Patient was admitted on 1/25 with syncopal episode.  He was transferred to the ICU for agitation and concern for alcohol withdrawal.  Patient's family reports that he has not been drinking regularly.  He is agitated.  He had 2 episodes of bright red blood per rectum last night.  Patient's friend at bedside reports he has had a lot of difficulty in the past with diverticulitis.  The patient denies abdominal pain.  He has had no nausea or vomiting.  He has been n.p.o. due to the bleeding.  Hemoglobin is in the normal range.    EGD colonoscopy 2/14/2024-Lepe's esophagus without dysplasia, diverticulosis and hemorrhoids    Past Medical History:  Past Medical History:   Diagnosis Date    ADHD (attention deficit hyperactivity disorder)     On going issue    Anxiety     Lepe's esophagus     Benign prostatic hyperplasia Surgery in 12/2021    Brain concussion 11/2023    Clotting disorder Intestinal    Depression     Diverticulitis     Diverticulosis     Duodenitis     Enteritis     Failure to thrive (child)     Family history of blood clots     Fracture of wrist 1985    Fracture, foot 2019    GERD (gastroesophageal reflux disease)     Hyperlipidemia     Hypertension     Hypertensive emergency     Irritable bowel syndrome 2017    Low testosterone     Microcytosis     Pancreatitis     Pneumonia     PONV (postoperative nausea and vomiting)     Seasonal affective disorder     Shoulder injury     Stroke     Unexplained weight loss     Visual impairment 8/2023     Past Surgical History:  Past Surgical History:   Procedure Laterality Date    CARDIAC ELECTROPHYSIOLOGY PROCEDURE N/A 01/24/2024    Procedure: Loop insertion- Medtronic LINQ;   Surgeon: Kaela Walker MD;  Location: [facility] CATH INVASIVE LOCATION;  Service: Cardiovascular;  Laterality: N/A;    COLON SURGERY      COLONOSCOPY      COLONOSCOPY N/A 12/11/2022    Procedure: COLONOSCOPY INTO CECUM WITH HOT SNARE POLYPECTOMY;  Surgeon: Albert Gallo MD;  Location: [facility] ENDOSCOPY;  Service: Gastroenterology;  Laterality: N/A;  PRE- GI BLEED  POST- DIVERTICULOSIS, POLYP    COLONOSCOPY N/A 02/14/2024    Procedure: COLONOSCOPY into cecum/terminal ileum;  Surgeon: Albert Gallo MD;  Location: [facility] ENDOSCOPY;  Service: Gastroenterology;  Laterality: N/A;  diverticulosis, hemorrhoids    ENDOSCOPY N/A 12/10/2022    Procedure: ESOPHAGOGASTRODUODENOSCOPY;  Surgeon: Albert Gallo MD;  Location: [facility] ENDOSCOPY;  Service: Gastroenterology;  Laterality: N/A;  PRE- DARK STOOLS  POST- BARRETTS ESOPHAGUS    ENDOSCOPY N/A 02/14/2024    Procedure: ESOPHAGOGASTRODUODENOSCOPY with biopsy;  Surgeon: Albert Gallo MD;  Location: [facility] ENDOSCOPY;  Service: Gastroenterology;  Laterality: N/A;  long segment wilson's, hiatal hernia    FRACTURE SURGERY Right 1980    for broken arm    FRACTURE SURGERY Right     for broken hand    HAND SURGERY  1990    INCISION AND DRAINAGE ABSCESS  2015    anal    PROSTATE SURGERY      SHOULDER SURGERY  2022    SMALL INTESTINE SURGERY      TONSILLECTOMY      TRIGGER POINT INJECTION  2017      Social History:   Social History     Tobacco Use    Smoking status: Never    Smokeless tobacco: Never   Substance Use Topics    Alcohol use: Not Currently     Comment: 12 beers on the weekend      Family History:  Family History   Problem Relation Age of Onset    Stroke Mother     Heart disease Mother     Stroke Father     Hyperlipidemia Father     Hypertension Father        Home Meds:  Medications Prior to Admission   Medication Sig Dispense Refill Last Dose/Taking    amLODIPine (NORVASC) 10 MG tablet Take 1 tablet by mouth Daily.   1/25/2025 at  2:00 AM    carvedilol (COREG) 3.125  MG tablet Take 1 tablet by mouth 2 (Two) Times a Day With Meals. Pt unsure of dose   1/25/2025 at  7:00 AM    hydrALAZINE (APRESOLINE) 25 MG tablet Take 1 tablet by mouth 3 (Three) Times a Day.   1/25/2025 at  4:00 AM    lisinopril (PRINIVIL,ZESTRIL) 40 MG tablet Take 1 tablet by mouth Daily.   1/25/2025 at  2:00 AM    apixaban (ELIQUIS) 5 MG tablet tablet Take 1 tablet by mouth 2 (Two) Times a Day. (Patient not taking: Reported on 7/3/2024) 60 tablet 11     ASPIRIN 81 PO Take  by mouth. (Patient not taking: Reported on 1/25/2025)   Not Taking    lisinopril (PRINIVIL,ZESTRIL) 40 MG tablet Take 1 tablet by mouth Daily for 30 doses. 30 tablet 0     thiamine (VITAMIN B1) 100 MG tablet Take 1 tablet by mouth Daily. (Patient not taking: Reported on 7/3/2024) 90 tablet 0     vilazodone (Viibryd) 40 MG tablet tablet Take 1 tablet by mouth Daily. 90 tablet 1     vitamin B-12 (CYANOCOBALAMIN) 500 MCG tablet Take 1 tablet by mouth Daily. (Patient not taking: Reported on 7/3/2024) 90 tablet 0      Current Meds:   amLODIPine, 10 mg, Oral, Daily  aspirin, 81 mg, Oral, Daily  atorvastatin, 40 mg, Oral, Nightly  diazePAM, 2.5 mg, Intravenous, Q6H  folic acid 1 mg in sodium chloride 0.9 % 50 mL IVPB, 1 mg, Intravenous, Daily  lisinopril, 40 mg, Oral, Daily  LORazepam, 1 mg, Oral, Q12H   Followed by  [START ON 1/29/2025] LORazepam, 1 mg, Oral, Daily  [Held by provider] metoprolol tartrate, 50 mg, Oral, Q12H  multivitamin with minerals, 1 tablet, Oral, Daily  mupirocin, 1 Application, Each Nare, BID  PHENobarbital, 32.4 mg, Oral, Once   Followed by  [START ON 1/29/2025] PHENobarbital, 32.4 mg, Oral, Once   Followed by  [START ON 1/29/2025] PHENobarbital, 32.4 mg, Oral, Once  potassium chloride, 10 mEq, Intravenous, Q1H  senna-docusate sodium, 2 tablet, Oral, BID  thiamine (B-1) IV, 200 mg, Intravenous, Q8H   Followed by  [START ON 1/31/2025] thiamine, 100 mg, Oral, Daily      Allergies:  No Known Allergies  Review of  Systems  Pertinent items are noted in HPI     Objective     Vital Signs  Temp:  [97.9 °F (36.6 °C)-99.1 °F (37.3 °C)] 98.5 °F (36.9 °C)  Heart Rate:  [50-82] 56  Resp:  [20-24] 20  BP: ()/() 133/86  Physical Exam:  General Appearance:  Agitated and somewhat confused   Head:    Normocephalic, without obvious abnormality, atraumatic   Eyes:          conjunctivae and sclerae normal, no   icterus   Throat:   no thrush, oral mucosa moist   Neck:   Supple, no adenopathy   Lungs:     Clear to auscultation bilaterally    Heart:    Regular rhythm and normal rate    Chest Wall:    No abnormalities observed   Abdomen:     Soft, nondistended, nontender; normal bowel sounds   Extremities:   no edema, no redness   Skin:   No bruising or rash       Results Review:   I reviewed the patient's new clinical results.    Results from last 7 days   Lab Units 01/28/25  0347 01/27/25  1653 01/27/25  0323 01/26/25  0609   WBC 10*3/mm3 11.18*  --  6.91 6.06   HEMOGLOBIN g/dL 13.2 14.0 14.0 14.7   HEMATOCRIT % 41.4 41.3 42.4 43.9   PLATELETS 10*3/mm3 189  --  201 223     Results from last 7 days   Lab Units 01/28/25  0347 01/27/25  0323 01/26/25  0609 01/25/25  0928   SODIUM mmol/L 137 139 139 140   POTASSIUM mmol/L 3.4* 3.7 3.9 4.1   CHLORIDE mmol/L 108* 108* 106 103   CO2 mmol/L 19.3* 21.0* 23.0 23.4   BUN mg/dL 12 15 14 12   CREATININE mg/dL 0.81 0.85 0.92 0.82   CALCIUM mg/dL 8.0* 7.9* 8.4* 9.0   BILIRUBIN mg/dL  --   --   --  0.5   ALK PHOS U/L  --   --   --  72   ALT (SGPT) U/L  --   --   --  31   AST (SGOT) U/L  --   --   --  22   GLUCOSE mg/dL 109* 113* 119* 95         Lab Results   Lab Value Date/Time    LIPASE 34 12/09/2022 0759    LIPASE 220 (H) 12/08/2022 0653    LIPASE 87 (H) 12/02/2022 1124    LIPASE 380 (H) 07/01/2019 0727    LIPASE 766 (H) 02/12/2019 0847    LIPASE 204 12/18/2018 0633    LIPASE 190 11/27/2018 0820    LIPASE 187 11/14/2018 2334    LIPASE 182 11/10/2018 0915    LIPASE 65 (H) 11/05/2018 0630    LIPASE  146 09/18/2018 0822    LIPASE 275 07/26/2018 2028    LIPASE 468 (H) 07/18/2018 0555    LIPASE 156 06/27/2018 0440    LIPASE 190 06/18/2018 0537    LIPASE 180 05/18/2018 1538    LIPASE 153 05/12/2018 0847    LIPASE 99 03/30/2018 1455    LIPASE 87 (H) 01/01/2018 0949    LIPASE 52 12/22/2017 1029       Radiology:  CT Angiogram Head   Final Result   There is no evidence of acute infarction or intracranial   hemorrhage. Further evaluation could be performed with MRI examination   of the brain. There is no evidence of carotid or proximal intracranial   arterial high-grade stenosis or occlusion. See above.               Radiation dose reduction techniques were utilized, including automated   exposure control and exposure modulation based on body size.           This report was finalized on 1/27/2025 1:45 PM by Dr. Alden Leigh M.D   on Workstation: [id number]          CT Angiogram Neck   Final Result   There is no evidence of acute infarction or intracranial   hemorrhage. Further evaluation could be performed with MRI examination   of the brain. There is no evidence of carotid or proximal intracranial   arterial high-grade stenosis or occlusion. See above.               Radiation dose reduction techniques were utilized, including automated   exposure control and exposure modulation based on body size.           This report was finalized on 1/27/2025 1:45 PM by Dr. Alden Leigh M.D   on Workstation: [id number]          XR Chest 1 View   Final Result       Allowing for submaximal inspiratory result, heart size appears within   normal limits.       There is a submaximal inspiratory result. Allowing for that, there is no   convincing evidence of infiltrate, effusion or pneumothorax.               This report was finalized on 1/26/2025 8:09 PM by Dr. Rik Ash M.D on Workstation: [id number]          CT Angiogram Chest Pulmonary Embolism   Final Result   No evidence of pulmonary embolus.       Radiation dose  reduction techniques were utilized, including automated   exposure control and exposure modulation based on body size.           This report was finalized on 1/27/2025 2:47 PM by Dr. Salvador Arita M.D on Workstation: [id number]          XR Chest 1 View   Final Result   1. No acute process.           This report was finalized on 1/25/2025 9:57 AM by Dr. Salvador Arita M.D on Workstation: [id number]          CT Head Without Contrast   Final Result   No acute process.               Radiation dose reduction techniques were utilized, including automated   exposure control and exposure modulation based on body size.           This report was finalized on 1/26/2025 9:24 AM by Dr. Salvador Arita M.D on Workstation: [id number]          MRI Brain Without Contrast    (Results Pending)       Assessment & Plan   Active Hospital Problems    Diagnosis     **Syncope     Alcohol withdrawal     Hypertensive emergency     History of atrial fibrillation     Alcohol use     Mood disorder     History of CVA (cerebrovascular accident)        Assessment:  Rectal bleeding  Mental status changes  History of diverticulitis  GERD with history of Lepe's esophagus    Plan:  Recommend continued observation.  No plans for endoscopic evaluation currently.  Hemoglobin remains normal.  Known internal hemorrhoids.  Will follow his clinical course but in the absence of clinically significant bleeding with drop in hemoglobin, do not anticipate any endoscopic testing.  He has had a fairly recent EGD and colonoscopy.  Daily PPI given history of GERD and Lepe's  Advance diet as tolerated  Follow hemoglobin      I discussed the patients findings and my recommendations with patient, family, nursing staff, and consulting provider.          Margo Youssef M.D.  [facility]  [phone]

## 2025-01-28 NOTE — PLAN OF CARE
Goal Outcome Evaluation:              Outcome Evaluation: Pt calm and agreeable for most of the day until 6pm when he started to become agitated with staff. GI bleed and GI consulted. Friend at bedside stating that patient is not an alcoholic. Will continue plan of care.

## 2025-01-28 NOTE — PROGRESS NOTES
LOS: 2 days   Patient Care Team:  Akash Rodriguez Jr.,  as PCP - General (Sports Medicine)    Chief Complaint: Follow-up syncope, labile blood pressure.    Interval History: Continues to have paranoid behavior and require wrist restraints.  Blood pressure is better in general.    Vital Signs:  Temp:  [97.9 °F (36.6 °C)-99.1 °F (37.3 °C)] 98.5 °F (36.9 °C)  Heart Rate:  [50-82] 59  Resp:  [20-24] 20  BP: ()/() 128/96    Intake/Output Summary (Last 24 hours) at 1/28/2025 1333  Last data filed at 1/28/2025 0945  Gross per 24 hour   Intake 220 ml   Output 1277.9 ml   Net -1057.9 ml       Physical Exam:   General Appearance:    No acute distress, paranoid, restrained.   Lungs:     Clear to auscultation bilaterally     Heart:    Regular rhythm and normal rate.  No murmurs, gallops, or  rubs.   Abdomen:     Soft, nontender, nondistended.    Extremities:   No clubbing, cyanosis, or edema.     Results Review:    Results from last 7 days   Lab Units 01/28/25  0347   SODIUM mmol/L 137   POTASSIUM mmol/L 3.4*   CHLORIDE mmol/L 108*   CO2 mmol/L 19.3*   BUN mg/dL 12   CREATININE mg/dL 0.81   GLUCOSE mg/dL 109*   CALCIUM mg/dL 8.0*     Results from last 7 days   Lab Units 01/26/25  0609 01/25/25  1034 01/25/25  0928   HSTROP T ng/L <6 <6 <6     Results from last 7 days   Lab Units 01/28/25  0347   WBC 10*3/mm3 11.18*   HEMOGLOBIN g/dL 13.2   HEMATOCRIT % 41.4   PLATELETS 10*3/mm3 189         Results from last 7 days   Lab Units 01/26/25  0609   CHOLESTEROL mg/dL 163     Results from last 7 days   Lab Units 01/25/25  0928   MAGNESIUM mg/dL 2.1     Results from last 7 days   Lab Units 01/26/25  0609   CHOLESTEROL mg/dL 163   TRIGLYCERIDES mg/dL 154*   HDL CHOL mg/dL 33*   LDL CHOL mg/dL 103*       I reviewed the patient's new clinical results.        Assessment:  1.  Syncope of uncertain etiology  2.  Severe hypertension with hypertensive emergency on admission  3.  History of left cerebellar CVA in August  2023  4.  GERD with a history of Lepe's esophagus  5.  Alcohol use with possible current alcohol withdrawal  6.  Paroxysmal atrial fibrillation  7.  Status post loop recorder placement January 2024  8.  Paranoid and aggressive behavior    Plan:  -Again, he continues to have paranoid and aggressive behavior of uncertain etiology.  Neurology and psychiatry are now seen.  A brain MRI has been ordered.    -Loop recorder interrogation showed 1 isolated brief episode of what appears to be atrial flutter on 1/8/2025.  Otherwise, no events.  There were no events to explain his syncopal episode.    -Blood pressure actually got hypotensive yesterday and still varies widely.  However, more controlled today.  Continue amlodipine and lisinopril.  Metoprolol is being held for bradycardia.  Heart rate is now in the 50s.    -Eliquis has been stopped because of frequent falls.  It also evidently was cost prohibitive to the patient in the past.    Carlos Foster MD  01/28/25  13:33 EST

## 2025-01-28 NOTE — PLAN OF CARE
Goal Outcome Evaluation:      Patient became extremely agitated overnight.

## 2025-01-28 NOTE — SIGNIFICANT NOTE
01/28/25 1447   OTHER   Discipline occupational therapist   Rehab Time/Intention   Session Not Performed   (Pt not appropriate at this time for OT. Continues to have paranoid behavior and require restraints. Pt was extremely agitated early this morning. Will follow up as appropriate)   Recommendation   OT - Next Appointment 01/29/25

## 2025-01-28 NOTE — SIGNIFICANT NOTE
01/28/25 1145   OTHER   Discipline physical therapist   Rehab Time/Intention   Session Not Performed other (see comments)  (Not appropriate today per RN. Will follow up tomorrow.)   Recommendation   PT - Next Appointment 01/29/25

## 2025-01-28 NOTE — NURSING NOTE
Pt extremely agitated overnight. Verbally abusive to sitter and RN. Called 911 on cell phone saying staff was not helping him. Patient was saying he could not breathe while laying face down in the bed. SpO2 99%. Staff attempted to help patient sit upright and pt pushed staff off of him. Security called and arrived at bedside. Patient called sitter multiple inappropriate names. Patient was screaming and thrashing in the bed. Valium given. Restraints applied. Patient continues to have episodes of screaming and verbal aggression. Patient remains on precedex and protonix gtt. Potassium is being replaced. Patient has been NPO since midnight for possible scope. Pt was straight catheterized, with 500cc urine output. Metoprolol being held d/t decreased blood pressure per Dr. Brandon Cifuentes.

## 2025-01-29 LAB
ALBUMIN SERPL-MCNC: 3.1 G/DL (ref 3.5–5.2)
ALBUMIN/GLOB SERPL: 1.3 G/DL
ALP SERPL-CCNC: 68 U/L (ref 39–117)
ALT SERPL W P-5'-P-CCNC: 17 U/L (ref 1–41)
ANION GAP SERPL CALCULATED.3IONS-SCNC: 8 MMOL/L (ref 5–15)
AST SERPL-CCNC: 17 U/L (ref 1–40)
BASOPHILS # BLD AUTO: 0.02 10*3/MM3 (ref 0–0.2)
BASOPHILS NFR BLD AUTO: 0.2 % (ref 0–1.5)
BILIRUB SERPL-MCNC: 0.7 MG/DL (ref 0–1.2)
BUN SERPL-MCNC: 7 MG/DL (ref 6–20)
BUN/CREAT SERPL: 8.6 (ref 7–25)
CALCIUM SPEC-SCNC: 7.7 MG/DL (ref 8.6–10.5)
CHLORIDE SERPL-SCNC: 108 MMOL/L (ref 98–107)
CO2 SERPL-SCNC: 20 MMOL/L (ref 22–29)
CREAT SERPL-MCNC: 0.81 MG/DL (ref 0.76–1.27)
DEPRECATED RDW RBC AUTO: 40.5 FL (ref 37–54)
EGFRCR SERPLBLD CKD-EPI 2021: 102.8 ML/MIN/1.73
EOSINOPHIL # BLD AUTO: 0.29 10*3/MM3 (ref 0–0.4)
EOSINOPHIL NFR BLD AUTO: 3 % (ref 0.3–6.2)
ERYTHROCYTE [DISTWIDTH] IN BLOOD BY AUTOMATED COUNT: 13.3 % (ref 12.3–15.4)
GLOBULIN UR ELPH-MCNC: 2.4 GM/DL
GLUCOSE SERPL-MCNC: 93 MG/DL (ref 65–99)
HCT VFR BLD AUTO: 38.9 % (ref 37.5–51)
HGB BLD-MCNC: 13.3 G/DL (ref 13–17.7)
IMM GRANULOCYTES # BLD AUTO: 0.03 10*3/MM3 (ref 0–0.05)
IMM GRANULOCYTES NFR BLD AUTO: 0.3 % (ref 0–0.5)
LYMPHOCYTES # BLD AUTO: 1.27 10*3/MM3 (ref 0.7–3.1)
LYMPHOCYTES NFR BLD AUTO: 13.3 % (ref 19.6–45.3)
MCH RBC QN AUTO: 28.6 PG (ref 26.6–33)
MCHC RBC AUTO-ENTMCNC: 34.2 G/DL (ref 31.5–35.7)
MCV RBC AUTO: 83.7 FL (ref 79–97)
MONOCYTES # BLD AUTO: 1.01 10*3/MM3 (ref 0.1–0.9)
MONOCYTES NFR BLD AUTO: 10.6 % (ref 5–12)
NEUTROPHILS NFR BLD AUTO: 6.92 10*3/MM3 (ref 1.7–7)
NEUTROPHILS NFR BLD AUTO: 72.6 % (ref 42.7–76)
NRBC BLD AUTO-RTO: 0 /100 WBC (ref 0–0.2)
PLATELET # BLD AUTO: 178 10*3/MM3 (ref 140–450)
PMV BLD AUTO: 10.3 FL (ref 6–12)
POTASSIUM SERPL-SCNC: 3.9 MMOL/L (ref 3.5–5.2)
POTASSIUM SERPL-SCNC: 3.9 MMOL/L (ref 3.5–5.2)
PROT SERPL-MCNC: 5.5 G/DL (ref 6–8.5)
RBC # BLD AUTO: 4.65 10*6/MM3 (ref 4.14–5.8)
SODIUM SERPL-SCNC: 136 MMOL/L (ref 136–145)
WBC NRBC COR # BLD AUTO: 9.54 10*3/MM3 (ref 3.4–10.8)

## 2025-01-29 PROCEDURE — 85025 COMPLETE CBC W/AUTO DIFF WBC: CPT | Performed by: INTERNAL MEDICINE

## 2025-01-29 PROCEDURE — 90791 PSYCH DIAGNOSTIC EVALUATION: CPT | Performed by: SOCIAL WORKER

## 2025-01-29 PROCEDURE — 99232 SBSQ HOSP IP/OBS MODERATE 35: CPT | Performed by: INTERNAL MEDICINE

## 2025-01-29 PROCEDURE — 25010000002 THIAMINE PER 100 MG: Performed by: INTERNAL MEDICINE

## 2025-01-29 PROCEDURE — 94660 CPAP INITIATION&MGMT: CPT

## 2025-01-29 PROCEDURE — 25010000002 HYDRALAZINE PER 20 MG: Performed by: INTERNAL MEDICINE

## 2025-01-29 PROCEDURE — 94799 UNLISTED PULMONARY SVC/PX: CPT

## 2025-01-29 PROCEDURE — 99231 SBSQ HOSP IP/OBS SF/LOW 25: CPT | Performed by: PSYCHIATRY & NEUROLOGY

## 2025-01-29 PROCEDURE — 84132 ASSAY OF SERUM POTASSIUM: CPT | Performed by: STUDENT IN AN ORGANIZED HEALTH CARE EDUCATION/TRAINING PROGRAM

## 2025-01-29 PROCEDURE — 25010000002 DIAZEPAM PER 5 MG: Performed by: STUDENT IN AN ORGANIZED HEALTH CARE EDUCATION/TRAINING PROGRAM

## 2025-01-29 PROCEDURE — 80053 COMPREHEN METABOLIC PANEL: CPT | Performed by: STUDENT IN AN ORGANIZED HEALTH CARE EDUCATION/TRAINING PROGRAM

## 2025-01-29 PROCEDURE — 25810000003 LACTATED RINGERS SOLUTION

## 2025-01-29 RX ORDER — FOLIC ACID 1 MG/1
1 TABLET ORAL DAILY
Status: DISCONTINUED | OUTPATIENT
Start: 2025-01-30 | End: 2025-02-02 | Stop reason: HOSPADM

## 2025-01-29 RX ORDER — METOPROLOL TARTRATE 25 MG/1
25 TABLET, FILM COATED ORAL EVERY 12 HOURS SCHEDULED
Status: DISCONTINUED | OUTPATIENT
Start: 2025-01-29 | End: 2025-02-01

## 2025-01-29 RX ORDER — HALOPERIDOL 5 MG/ML
5 INJECTION INTRAMUSCULAR EVERY 6 HOURS PRN
Status: DISCONTINUED | OUTPATIENT
Start: 2025-01-29 | End: 2025-02-01

## 2025-01-29 RX ORDER — HYDROCORTISONE ACETATE 25 MG/1
25 SUPPOSITORY RECTAL 2 TIMES DAILY
Status: DISCONTINUED | OUTPATIENT
Start: 2025-01-29 | End: 2025-02-02 | Stop reason: HOSPADM

## 2025-01-29 RX ORDER — ESCITALOPRAM OXALATE 10 MG/1
10 TABLET ORAL NIGHTLY
Status: DISCONTINUED | OUTPATIENT
Start: 2025-01-29 | End: 2025-02-02 | Stop reason: HOSPADM

## 2025-01-29 RX ADMIN — ASPIRIN 81 MG CHEWABLE TABLET 81 MG: 81 TABLET CHEWABLE at 08:00

## 2025-01-29 RX ADMIN — Medication 1 TABLET: at 08:00

## 2025-01-29 RX ADMIN — DEXMEDETOMIDINE HYDROCHLORIDE IN SODIUM CHLORIDE 0.8 MCG/KG/HR: 4 INJECTION INTRAVENOUS at 10:11

## 2025-01-29 RX ADMIN — THIAMINE HYDROCHLORIDE 200 MG: 100 INJECTION, SOLUTION INTRAMUSCULAR; INTRAVENOUS at 13:21

## 2025-01-29 RX ADMIN — DEXMEDETOMIDINE HYDROCHLORIDE IN SODIUM CHLORIDE 1.5 MCG/KG/HR: 4 INJECTION INTRAVENOUS at 03:46

## 2025-01-29 RX ADMIN — PHENOBARBITAL 32.4 MG: 32.4 TABLET ORAL at 04:03

## 2025-01-29 RX ADMIN — THIAMINE HYDROCHLORIDE 200 MG: 100 INJECTION, SOLUTION INTRAMUSCULAR; INTRAVENOUS at 05:30

## 2025-01-29 RX ADMIN — FOLIC ACID 1 MG: 5 INJECTION, SOLUTION INTRAMUSCULAR; INTRAVENOUS; SUBCUTANEOUS at 08:01

## 2025-01-29 RX ADMIN — THIAMINE HYDROCHLORIDE 200 MG: 100 INJECTION, SOLUTION INTRAMUSCULAR; INTRAVENOUS at 21:28

## 2025-01-29 RX ADMIN — DEXMEDETOMIDINE HYDROCHLORIDE IN SODIUM CHLORIDE 1.5 MCG/KG/HR: 4 INJECTION INTRAVENOUS at 22:55

## 2025-01-29 RX ADMIN — SODIUM CHLORIDE, POTASSIUM CHLORIDE, SODIUM LACTATE AND CALCIUM CHLORIDE 1000 ML: 600; 310; 30; 20 INJECTION, SOLUTION INTRAVENOUS at 18:46

## 2025-01-29 RX ADMIN — DIAZEPAM 2.5 MG: 5 INJECTION INTRAMUSCULAR; INTRAVENOUS at 12:01

## 2025-01-29 RX ADMIN — HYDRALAZINE HYDROCHLORIDE 10 MG: 20 INJECTION INTRAMUSCULAR; INTRAVENOUS at 12:37

## 2025-01-29 RX ADMIN — DIAZEPAM 10 MG: 5 TABLET ORAL at 07:57

## 2025-01-29 RX ADMIN — DEXMEDETOMIDINE HYDROCHLORIDE IN SODIUM CHLORIDE 1.5 MCG/KG/HR: 4 INJECTION INTRAVENOUS at 06:30

## 2025-01-29 RX ADMIN — DIAZEPAM 2.5 MG: 5 INJECTION INTRAMUSCULAR; INTRAVENOUS at 05:30

## 2025-01-29 RX ADMIN — PHENOBARBITAL 32.4 MG: 32.4 TABLET ORAL at 16:59

## 2025-01-29 RX ADMIN — SENNOSIDES AND DOCUSATE SODIUM 2 TABLET: 50; 8.6 TABLET ORAL at 20:57

## 2025-01-29 RX ADMIN — LISINOPRIL 40 MG: 20 TABLET ORAL at 08:00

## 2025-01-29 RX ADMIN — MUPIROCIN 1 APPLICATION: 20 OINTMENT TOPICAL at 08:01

## 2025-01-29 RX ADMIN — DIAZEPAM 10 MG: 5 INJECTION INTRAMUSCULAR; INTRAVENOUS at 12:49

## 2025-01-29 RX ADMIN — DIAZEPAM 10 MG: 5 INJECTION INTRAMUSCULAR; INTRAVENOUS at 15:13

## 2025-01-29 RX ADMIN — ATORVASTATIN CALCIUM 40 MG: 20 TABLET, FILM COATED ORAL at 20:57

## 2025-01-29 RX ADMIN — HYDROCORTISONE ACETATE 25 MG: 25 SUPPOSITORY RECTAL at 20:57

## 2025-01-29 RX ADMIN — DEXMEDETOMIDINE HYDROCHLORIDE IN SODIUM CHLORIDE 1.5 MCG/KG/HR: 4 INJECTION INTRAVENOUS at 16:59

## 2025-01-29 RX ADMIN — PANTOPRAZOLE SODIUM 40 MG: 40 TABLET, DELAYED RELEASE ORAL at 07:57

## 2025-01-29 RX ADMIN — ESCITALOPRAM 10 MG: 10 TABLET, FILM COATED ORAL at 20:57

## 2025-01-29 RX ADMIN — HYDROCORTISONE ACETATE 25 MG: 25 SUPPOSITORY RECTAL at 12:00

## 2025-01-29 RX ADMIN — DEXMEDETOMIDINE HYDROCHLORIDE IN SODIUM CHLORIDE 1.5 MCG/KG/HR: 4 INJECTION INTRAVENOUS at 19:55

## 2025-01-29 RX ADMIN — DEXMEDETOMIDINE HYDROCHLORIDE IN SODIUM CHLORIDE 1.5 MCG/KG/HR: 4 INJECTION INTRAVENOUS at 00:41

## 2025-01-29 RX ADMIN — AMLODIPINE BESYLATE 10 MG: 10 TABLET ORAL at 08:00

## 2025-01-29 RX ADMIN — MUPIROCIN 1 APPLICATION: 20 OINTMENT TOPICAL at 20:57

## 2025-01-29 NOTE — PROGRESS NOTES
"The patient is greatly improved today.  He is pleasant cooperative and exhibits no signs of psychosis.  He is contrite over the events which led to his restraint and need for Precedex and vehemently denies abuse of alcohol outside the hospital.  He does report a history of treatment for depression in the past and states that he was on Abilify\" another medication.  He also reports that he has undergone genomic testing in the past but does not have the results of that testing handy.  He is agreeable to a trial of Lexapro 10 mg daily as he does not report a history that seem consistent with a bipolar spectrum disorder.  "

## 2025-01-29 NOTE — CONSULTS
"[facility] evaluated 59-year-old male for alcohol abuse.  Patient came in 4 days ago after having a syncope spell and dropping to the floor at home.  Patient states he has frequent falls after having had several strokes last year.  Patient has had some episodes of agitation and has been in and out of restraints but is very tearful during the evaluation.  Patient states the strokes have made things difficult for him as he oftentimes \"cannot connect the dots\".  Patient son was in room with patient's permission.  Patient's son is one of his main supports.  Patient's son states that patient might go through a 12 pack in 2 days but then does not drink for the 5 days afterwards.  Patient states it is not always a whole 12 pack either.  Patient states he is usually the designated  when his friends and he go out because he will not drink when he is out.    Patient has been through a lot of trauma starting back 5 or 6 years ago when he was caring for his parents who were sick.  Patient states his brother and sister did not give him any help with that.  Patient states he ended up losing his job in order to care for his mother after his father .  Patient states at one point he was rushing up after his mother cried out and he fell and damaged his shoulder which needed months of rehab after surgery.  Patient states because he had lost his job caring for his parents he was unable to go into counseling to deal with the trauma of caring for them as well as the loss of them.  Patient was tearful and making statements that he was a poor parent because he could not parent his child while taking care of his parents.  Patient also made a statement \"I am not worthy\" when asked if he was in a relationship.  Patient rated both his anxiety and depression as a 10/10.  Patient states he was put on Abilify after a gene site test but stopped it mid summer last year when he lost his insurance.  Patient felt like the Abilify was " "helpful and had worked up to 40 mg daily.    Patient is having much trouble sleeping as he feels like he is going to stop breathing.  Patient had a sleep study done last year and was getting ready to get a CPAP machine but lost his insurance and never obtained the machine.  Patient states his heart stopped for 13 seconds while on a loop machine last  and \"that messes with me\".  Patient states he felt like he could not breathe a couple of days ago when he was very agitated and had to be subdued by 6 security guards.  Patient is tearful and apologetic about his behavior.  Patient states he is awake every 2 hours.  Patient's appetite has been poor the last few days.  Patient denies SI and denies a history of attempts but states he has talked about feeling suicidal in the past.  Patient very tearful as he talks about being on disability but wanting to be able to work or volunteer as he feels very depressed just sitting at home.  Patient denies drug use but states he has smoked marijuana on occasion.    [place]  Patient states he was living in a crack hotel 5 years ago after his parents  and he was homeless for 3 months.  Patient's support includes his son and a close friend he has had for years.  Patient's son stated that he had never seen his father open up like this before.  Patient is okay with talking to the psychiatrist about an antidepressant medication and is also open to outpatient counseling resources.  Access has contacted [facility] to see if they can help give patient info on a CPAP machine.  Access will follow.  "

## 2025-01-29 NOTE — PROGRESS NOTES
[facility]  [phone]  Dr. Pool Nevarez     Subjective:  LOS: 3    Chief Complaint:  Increasing agitation and alcohol withdrawal     Patient was more lucid today however still with some confusion and was very tearful and emotionally labile on exam.    Objective   Vital Signs past 24hrs  Temp range: Temp (24hrs), Av °F (37.2 °C), Min:98.2 °F (36.8 °C), Max:100.1 °F (37.8 °C)    BP range: BP: (103-145)/() 145/99  Pulse range: Heart Rate:  [57-77] 77  Resp rate range: Resp:  [18-22] 22  Device (Oxygen Therapy): room airFlow (L/min) (Oxygen Therapy):  [1] 1  Oxygen range:SpO2:  [94 %-100 %] 99 %     Physical Exam  Vitals and nursing note reviewed.   Constitutional:       General: He is awake. He is not in acute distress.  HENT:      Head: Normocephalic and atraumatic.   Cardiovascular:      Rate and Rhythm: Normal rate and regular rhythm.      Heart sounds: No murmur heard.  Pulmonary:      Effort: Pulmonary effort is normal. No respiratory distress.      Breath sounds: Normal breath sounds. No wheezing, rhonchi or rales.   Abdominal:      General: Abdomen is flat.      Tenderness: There is no abdominal tenderness.   Skin:     General: Skin is warm and dry.      Findings: No rash.   Neurological:      Mental Status: He is alert.       Results Review:    I have reviewed the laboratory and imaging data since the last note by [facility] physician.  My annotations are noted in assessment and plan.      Result Review:  I have personally reviewed the results from last note by [facility] physician to 2025 14:03 EST and agree with these findings:  [x]  Laboratory list / accordion  [x]  Microbiology  [x]  Radiology  [x]  EKG/Telemetry   [x]  Cardiology/Vascular   [x]  Pathology  [x]  Old records  []  Other:    Medication Review:  I have reviewed the current MAR.  My annotations are noted in assessment and plan.    amLODIPine, 10 mg, Oral, Daily  aspirin, 81 mg, Oral, Daily  atorvastatin, 40 mg, Oral,  Nightly  escitalopram, 10 mg, Oral, Nightly  [START ON 1/30/2025] folic acid, 1 mg, Oral, Daily  hydrocortisone, 25 mg, Rectal, BID  lisinopril, 40 mg, Oral, Daily  [Held by provider] metoprolol tartrate, 25 mg, Oral, Q12H  multivitamin with minerals, 1 tablet, Oral, Daily  mupirocin, 1 Application, Each Nare, BID  pantoprazole, 40 mg, Oral, QAM AC  PHENobarbital, 32.4 mg, Oral, Once  senna-docusate sodium, 2 tablet, Oral, BID  thiamine (B-1) IV, 200 mg, Intravenous, Q8H   Followed by  [START ON 1/31/2025] thiamine, 100 mg, Oral, Daily        dexmedetomidine, 0.2-1.5 mcg/kg/hr, Last Rate: 0.6 mcg/kg/hr (01/29/25 1057)      Lines, Drains & Airways       Active LDAs       Name Placement date Placement time Site Days    Peripheral IV 01/28/25 0124 Posterior;Right Hand 01/28/25  0124  Hand  1    Peripheral IV 01/28/25 1052 Anterior;Right Antecubital 01/28/25  1052  Antecubital  1    Peripheral IV 01/28/25 1052 Left;Posterior Hand 01/28/25  1052  Hand  below thumb  1                  No active isolations  Diet Orders (active) (From admission, onward)       Start     Ordered    01/25/25 1624  Diet: Cardiac; Healthy Heart (2-3 Na+); Fluid Consistency: Thin (IDDSI 0)  Diet Effective Now         01/25/25 1623                      Assessment  Encephalopathy  Alcohol withdrawal with worsening agitation and combativeness  Syncope  Hypertensive emergency  Mood disorder  History of atrial fibrillation  Gastrosoft reflux disease with esophagitis and Lepe's esophagus  Diverticulosis     Plan  -Patient presenting with syncope and prior history of heavy alcohol use.  Was found to have an alcohol level of 0.  Has been managed his alcohol withdrawal on the floor.  Brought to ICU due to significant agitation and aggressive behavior  - Behavioral health consulted  - Continue Precedex and withdrawal taper  - Monitor blood pressure  -CTA head/neck and MRI brain both negative for any acute intracranial pathology  -Access and psychiatry  consulted and following  - Neurology consulted and appreciate input.  Given the patient was confirmed to be heavily drinking a few days per week with negative scans likely all due to alcohol withdrawal.  Signed off.  - Cardiology consulted regarding the syncope.  Loop recorder negative for any explanation to syncopal events.  Anticoagulation being held due to frequent falls.  Signed off  - Will remain in ICU given continued need for Precedex drip and agitation  - ICU core measures          Pool Nevarez DO   01/29/25  14:03 EST      Part of this note may be an electronic transcription/translation of spoken language to printed text using the Dragon Dictation System.  ]

## 2025-01-29 NOTE — PLAN OF CARE
Goal Outcome Evaluation: Patient remains in CICU on RA and 1L NC when sleeping. Bipap ordered for support at night. Sitter d/c, restraints d/c. Precedex gtt infusing. Patient has been emotional and apologetic for his behavior. He had an episode of agitation at 12pm with a CIWA score of 17, PRN medications given, patient tolerated well. Access consulted patient, see note. Lexapro started per psychiatry. Patient became hypotensive in the evening close to shift change, 1L LR bolus started. GI signed off, see note. Neurology signed off, see note. Cardiology signed off, see note. Family at bedside.

## 2025-01-29 NOTE — PROGRESS NOTES
Discharge Planning Assessment  [facility]     Patient Name: Flako Coreas Sr.  MRN: [id number]  Today's Date: 1/29/2025    Admit Date: 1/25/2025    Plan: Home   Discharge Needs Assessment       Row Name 01/29/25 1052       Living Environment    People in Home alone    Current Living Arrangements apartment    Potentially Unsafe Housing Conditions none    Primary Care Provided by self    Provides Primary Care For no one    Family Caregiver if Needed child(temo), adult    Family Caregiver Names Demetris Kwok son [phone]    Quality of Family Relationships supportive;involved;helpful    Able to Return to Prior Arrangements yes       Resource/Environmental Concerns    Resource/Environmental Concerns none       Transition Planning    Patient/Family Anticipates Transition to home    Patient/Family Anticipated Services at Transition none    Transportation Anticipated family or friend will provide       Discharge Needs Assessment    Equipment Currently Used at Home none                   Discharge Plan       Row Name 01/29/25 1053       Plan    Plan Home    Plan Comments Spoke with pt at bedside to screen for DCP/needs.  pt did confirm facesheet ifnormation as correct including PCP as Dr. Rodriguez.  Pt reports that he does live alone and plans to return home at [place].  Pt stated that he does recieve support from his son.  Pt reports that he does have a rolloing walker and cane at home but does not use.  Pt provided new insurance information to [facility] which was also forwarded to    to confirm pt's insurance coverage.     Pt also being screend by [employer] for any addiotnal coverage pt may be eligible for, Pt stated that he gets his meds filled at [facility]  [address]  Pt plans to get DC meds filled at  using meds to bed at [place].  Pt denies any need at [place].  CCP will follow  to assist if any needs do arise.    Pt stated family can transport him sha at [place].                  Continued Care and Services - Admitted Since  1/25/2025    No active coordination exists for this encounter.       Expected Discharge Date and Time       Expected Discharge Date Expected Discharge Time    Jan 30, 2025            Demographic Summary       Row Name 01/29/25 1051       General Information    Admission Type inpatient    Arrived From home    Referral Source admission list    Reason for Consult discharge planning    Preferred Language English                   Functional Status       Row Name 01/29/25 1052       Functional Status    Usual Activity Tolerance moderate    Current Activity Tolerance moderate       Functional Status, IADL    Medications independent    Meal Preparation independent    Housekeeping independent    Laundry independent    Shopping independent       Mental Status    General Appearance WDL WDL       Mental Status Summary    Recent Changes in Mental Status/Cognitive Functioning no changes                   Psychosocial    No documentation.                  Abuse/Neglect    No documentation.                  Legal    No documentation.                  Substance Abuse    No documentation.                  Patient Forms    No documentation.                     SANG Mayfield

## 2025-01-29 NOTE — PLAN OF CARE
Goal Outcome Evaluation:         Pt safety maintained, v/s WDL, sitter at bedside. Initially refused care and easily agitated (see nursing note), then eventually apologized, acknowledged plan of care, and grew more cooperative throughout the night.

## 2025-01-29 NOTE — PROGRESS NOTES
Neurology Progress Note    Reason for visit  Follow up for encephalopathy    Interval History  Patient completed brain MRI.  Additional history from patient and son indicate that he has been drinking alcohol heavily for several days per week.     He remains on Precedex and phenobarbital.     Medications:  Scheduled Meds:amLODIPine, 10 mg, Oral, Daily  aspirin, 81 mg, Oral, Daily  atorvastatin, 40 mg, Oral, Nightly  diazePAM, 2.5 mg, Intravenous, Q6H  [START ON 1/30/2025] folic acid, 1 mg, Oral, Daily  hydrocortisone, 25 mg, Rectal, BID  lisinopril, 40 mg, Oral, Daily  [Held by provider] metoprolol tartrate, 50 mg, Oral, Q12H  multivitamin with minerals, 1 tablet, Oral, Daily  mupirocin, 1 Application, Each Nare, BID  pantoprazole, 40 mg, Oral, QAM AC  PHENobarbital, 32.4 mg, Oral, Once  senna-docusate sodium, 2 tablet, Oral, BID  thiamine (B-1) IV, 200 mg, Intravenous, Q8H   Followed by  [START ON 1/31/2025] thiamine, 100 mg, Oral, Daily      Continuous Infusions:dexmedetomidine, 0.2-1.5 mcg/kg/hr, Last Rate: 0.6 mcg/kg/hr (01/29/25 1057)      PRN Meds:.  acetaminophen **OR** acetaminophen **OR** acetaminophen    senna-docusate sodium **AND** polyethylene glycol **AND** bisacodyl **AND** bisacodyl    Calcium Replacement - Follow Nurse / BPA Driven Protocol    cloNIDine    diazePAM **OR** diazePAM **OR** diazePAM **OR** diazePAM **OR** diazePAM **OR** diazePAM    haloperidol lactate    hydrALAZINE    Magnesium Standard Dose Replacement - Follow Nurse / BPA Driven Protocol    melatonin    nitroglycerin    ondansetron ODT **OR** ondansetron    Phosphorus Replacement - Follow Nurse / BPA Driven Protocol    Potassium Replacement - Follow Nurse / BPA Driven Protocol    sodium chloride    sodium chloride    Review of Systems:   Review of Systems   All other systems reviewed and are negative.        Vital Signs  Temp:  [98.2 °F (36.8 °C)-100.1 °F (37.8 °C)] 100.1 °F (37.8 °C)  Heart Rate:  [57-77] 77  Resp:  [18-22] 22  BP:  (103-145)/() 145/99    Physical Exam:  Constitutional:  HEENT:  normal  CVS:  Regular rate and rhythm.    Musculoskeletal:  No signs of peripheral edema  Neurologic:  Alert, oriented to year with difficulty  Not oriented to situation or place  No tremor or rigidity  EOMF with  no nystagmus  Psychiatric: anxious and tearful     Results Review:    Brain MRI with and without contrast shows no acute pathology    B12  568    TSH  1.520    Ammonia 16    Medical Decision Making and Recommendations  Psychosis  Due to alcohol withdrawal, with confirmation of alcohol use by family and patient    Brain MRI unremarkable     B12, TSH and ammonia are normal    Continue thiamine.    History of stroke  Continue aspirin, statin and blood pressure control to 130/80     I discussed these findings and my recommendations with nursing staff.    Neurology signing off, please call if needed further.      Stacey Borges MD  01/29/25  12:26 EST

## 2025-01-29 NOTE — PROGRESS NOTES
[facility]  Inpatient Progress Note    Reason for Follow Up: Rectal bleeding    Subjective     Interval History:   No further bleeding.  He denies abdominal pain or nausea.  He reports he is not hungry and nursing reports that he is not eating much    Current Facility-Administered Medications:     acetaminophen (TYLENOL) tablet 650 mg, 650 mg, Oral, Q4H PRN, 650 mg at 01/27/25 2015 **OR** acetaminophen (TYLENOL) 160 MG/5ML oral solution 650 mg, 650 mg, Oral, Q4H PRN **OR** acetaminophen (TYLENOL) suppository 650 mg, 650 mg, Rectal, Q4H PRN, Ester Bright MD    amLODIPine (NORVASC) tablet 10 mg, 10 mg, Oral, Daily, Ester Bright MD, 10 mg at 01/28/25 0904    aspirin chewable tablet 81 mg, 81 mg, Oral, Daily, Ester Bright MD, 81 mg at 01/28/25 0903    atorvastatin (LIPITOR) tablet 40 mg, 40 mg, Oral, Nightly, Ester Bright MD, 40 mg at 01/28/25 2055    sennosides-docusate (PERICOLACE) 8.6-50 MG per tablet 2 tablet, 2 tablet, Oral, BID, 2 tablet at 01/28/25 2055 **AND** polyethylene glycol (MIRALAX) packet 17 g, 17 g, Oral, Daily PRN **AND** bisacodyl (DULCOLAX) EC tablet 5 mg, 5 mg, Oral, Daily PRN **AND** bisacodyl (DULCOLAX) suppository 10 mg, 10 mg, Rectal, Daily PRN, Ester Bright MD    Calcium Replacement - Follow Nurse / BPA Driven Protocol, , Not Applicable, PRN, Ester Bright MD    cloNIDine (CATAPRES) tablet 0.1 mg, 0.1 mg, Oral, Q4H PRN, Jairon Fischer MD    dexmedetomidine (PRECEDEX) 400 mcg in 100 mL NS infusion, 0.2-1.5 mcg/kg/hr, Intravenous, Titrated, Jairon Fischer MD, Last Rate: 38.3 mL/hr at 01/29/25 0630, 1.5 mcg/kg/hr at 01/29/25 0630    diazePAM (VALIUM) tablet 10 mg, 10 mg, Oral, Q2H PRN **OR** diazePAM (VALIUM) injection 10 mg, 10 mg, Intravenous, Q2H PRN, 10 mg at 01/28/25 1812 **OR** diazePAM (VALIUM) tablet 15 mg, 15 mg, Oral, Q2H PRN **OR** diazePAM (VALIUM) injection 15 mg, 15 mg, Intravenous, Q2H PRN, 15 mg at 01/28/25 4426  **OR** diazePAM (VALIUM) tablet 20 mg, 20 mg, Oral, Q1H PRN **OR** diazePAM (VALIUM) injection 20 mg, 20 mg, Intravenous, Q1H PRN, Pool Nevarez DO    diazePAM (VALIUM) injection 2.5 mg, 2.5 mg, Intravenous, Q6H, Pool Nevarez DO, 2.5 mg at 01/29/25 0530    folic acid 1 mg in sodium chloride 0.9 % 50 mL IVPB, 1 mg, Intravenous, Daily, Pool Nevarez DO, Last Rate: 100 mL/hr at 01/28/25 0908, 1 mg at 01/28/25 0908    haloperidol lactate (HALDOL) injection 5 mg, 5 mg, Intramuscular, Q6H PRN, Teja Garrido III, MD    hydrALAZINE (APRESOLINE) injection 10 mg, 10 mg, Intravenous, Q6H PRN, Ester Bright MD    lisinopril (PRINIVIL,ZESTRIL) tablet 40 mg, 40 mg, Oral, Daily, Ester Bright MD, 40 mg at 01/28/25 0901    Magnesium Standard Dose Replacement - Follow Nurse / BPA Driven Protocol, , Not Applicable, PRN, Ester Bright MD    melatonin tablet 5 mg, 5 mg, Oral, Nightly PRN, Ester Bright MD    [Held by provider] metoprolol tartrate (LOPRESSOR) tablet 50 mg, 50 mg, Oral, Q12H, Ester Bright MD, 50 mg at 01/27/25 2009    multivitamin with minerals 1 tablet, 1 tablet, Oral, Daily, Ester Bright MD, 1 tablet at 01/27/25 0822    mupirocin (BACTROBAN) 2 % nasal ointment 1 Application, 1 Application, Each Nare, BID, Teddy Ma MD, 1 Application at 01/28/25 2055    nitroglycerin (NITROSTAT) SL tablet 0.4 mg, 0.4 mg, Sublingual, Q5 Min PRN, Ester Bright MD    ondansetron ODT (ZOFRAN-ODT) disintegrating tablet 4 mg, 4 mg, Oral, Q6H PRN **OR** ondansetron (ZOFRAN) injection 4 mg, 4 mg, Intravenous, Q6H PRN, Ester Bright MD    pantoprazole (PROTONIX) EC tablet 40 mg, 40 mg, Oral, QAM Domonique HERRERA Lauren C., MD    [COMPLETED] PHENobarbital injection 65 mg, 65 mg, Intravenous, Once, 65 mg at 01/27/25 1449 **FOLLOWED BY** [COMPLETED] PHENobarbital injection 65 mg, 65 mg, Intravenous, Once, 65 mg at 01/28/25 0237 **FOLLOWED BY** [COMPLETED] PHENobarbital  tablet 32.4 mg, 32.4 mg, Oral, Once, 32.4 mg at 01/28/25 1546 **FOLLOWED BY** [COMPLETED] PHENobarbital tablet 32.4 mg, 32.4 mg, Oral, Once, 32.4 mg at 01/29/25 0403 **FOLLOWED BY** PHENobarbital tablet 32.4 mg, 32.4 mg, Oral, Once, Brandon Cifuentes MD    Phosphorus Replacement - Follow Nurse / BPA Driven Protocol, , Not Applicable, PRDeangelo ENGLISH Samer H, MD    Potassium Replacement - Follow Nurse / BPA Driven Protocol, , Not Applicable, Deangelo OLIVEIRA Samer H, MD    sodium chloride 0.9 % flush 10 mL, 10 mL, Intravenous, Deangelo OLIVEIRA Samer H, MD    sodium chloride 0.9 % infusion 40 mL, 40 mL, Intravenous, PRN, Ester Bright MD    thiamine (B-1) injection 200 mg, 200 mg, Intravenous, Q8H, 200 mg at 01/29/25 0530 **FOLLOWED BY** [START ON 1/31/2025] thiamine (VITAMIN B-1) tablet 100 mg, 100 mg, Oral, Daily, Ester Bright MD  Review of Systems:    Negative for rectal bleeding or abdominal pain, no fever or chills    Objective     Vital Signs  Temp:  [98.6 °F (37 °C)-99 °F (37.2 °C)] 99 °F (37.2 °C)  Heart Rate:  [57-71] 63  Resp:  [18-22] 22  BP: (103-144)/() 133/90  Body mass index is 29.18 kg/m².    Intake/Output Summary (Last 24 hours) at 1/29/2025 0730  Last data filed at 1/29/2025 0728  Gross per 24 hour   Intake 2325.17 ml   Output 2450 ml   Net -124.83 ml     I/O this shift:  In: -   Out: 50 [Urine:50]     Physical Exam:   General: patient awake, alert and cooperative   Eyes: Normal lids and lashes, no scleral icterus   Neck: supple, normal ROM   Skin: warm and dry, not jaundiced   Pulm:   regular and unlabored   Abdomen: soft, nontender, nondistended    Extremities: no rash or edema   Psychiatric: Normal mood and behavior intact     Results Review:     I reviewed the patient's new clinical results.    Results from last 7 days   Lab Units 01/29/25  0525 01/28/25  0347 01/27/25  1653 01/27/25  0323   WBC 10*3/mm3 9.54 11.18*  --  6.91   HEMOGLOBIN g/dL 13.3 13.2 14.0 14.0   HEMATOCRIT % 38.9  41.4 41.3 42.4   PLATELETS 10*3/mm3 178 189  --  201     Results from last 7 days   Lab Units 01/29/25  0525 01/28/25  1511 01/28/25  0347 01/27/25  0323 01/26/25  0609 01/25/25  0928   SODIUM mmol/L 136  --  137 139   < > 140   POTASSIUM mmol/L 3.9  3.9 3.6 3.4* 3.7   < > 4.1   CHLORIDE mmol/L 108*  --  108* 108*   < > 103   CO2 mmol/L 20.0*  --  19.3* 21.0*   < > 23.4   BUN mg/dL 7  --  12 15   < > 12   CREATININE mg/dL 0.81  --  0.81 0.85   < > 0.82   CALCIUM mg/dL 7.7*  --  8.0* 7.9*   < > 9.0   BILIRUBIN mg/dL 0.7  --  0.8  --   --  0.5   ALK PHOS U/L 68  --  65  --   --  72   ALT (SGPT) U/L 17  --  20  --   --  31   AST (SGOT) U/L 17  --  17  --   --  22   GLUCOSE mg/dL 93  --  109* 113*   < > 95    < > = values in this interval not displayed.         Lab Results   Lab Value Date/Time    LIPASE 34 12/09/2022 0759    LIPASE 220 (H) 12/08/2022 0653    LIPASE 87 (H) 12/02/2022 1124    LIPASE 380 (H) 07/01/2019 0727    LIPASE 766 (H) 02/12/2019 0847    LIPASE 204 12/18/2018 0633    LIPASE 190 11/27/2018 0820    LIPASE 187 11/14/2018 2334    LIPASE 182 11/10/2018 0915    LIPASE 65 (H) 11/05/2018 0630    LIPASE 146 09/18/2018 0822    LIPASE 275 07/26/2018 2028    LIPASE 468 (H) 07/18/2018 0555    LIPASE 156 06/27/2018 0440    LIPASE 190 06/18/2018 0537    LIPASE 180 05/18/2018 1538    LIPASE 153 05/12/2018 0847    LIPASE 99 03/30/2018 1455    LIPASE 87 (H) 01/01/2018 0949    LIPASE 52 12/22/2017 1029       Radiology:  MRI Brain With & Without Contrast   Final Result   IMPRESSION : Negative brain MRI with and without contrast. Mild   nonspecific white matter change, no convincing interval change since   2/12/2024.       This report was finalized on 1/29/2025 1:38 AM by Dr. Rik Ash M.D on Workstation: [id number]          XR Humerus Right   Final Result       As described.               This report was finalized on 1/28/2025 10:11 AM by Dr. Ayo Meraz M.D on Workstation: [id number]          XR  Shoulder 2+ View Right   Final Result       As described.               This report was finalized on 1/28/2025 10:11 AM by Dr. Ayo Meraz M.D on Workstation: [id number]          XR Hand 2 View Right   Final Result       As described.               This report was finalized on 1/28/2025 10:11 AM by Dr. Ayo Meraz M.D on Workstation: [id number]          XR Forearm 2 View Right   Final Result       As described.               This report was finalized on 1/28/2025 10:11 AM by Dr. Ayo Meraz M.D on Workstation: [id number]          CT Angiogram Head   Final Result   There is no evidence of acute infarction or intracranial   hemorrhage. Further evaluation could be performed with MRI examination   of the brain. There is no evidence of carotid or proximal intracranial   arterial high-grade stenosis or occlusion. See above.               Radiation dose reduction techniques were utilized, including automated   exposure control and exposure modulation based on body size.           This report was finalized on 1/27/2025 1:45 PM by Dr. Alden Leigh M.D   on Workstation: [id number]          CT Angiogram Neck   Final Result   There is no evidence of acute infarction or intracranial   hemorrhage. Further evaluation could be performed with MRI examination   of the brain. There is no evidence of carotid or proximal intracranial   arterial high-grade stenosis or occlusion. See above.               Radiation dose reduction techniques were utilized, including automated   exposure control and exposure modulation based on body size.           This report was finalized on 1/27/2025 1:45 PM by Dr. Alden Leigh M.D   on Workstation: [id number]          XR Chest 1 View   Final Result       Allowing for submaximal inspiratory result, heart size appears within   normal limits.       There is a submaximal inspiratory result. Allowing for that, there is no   convincing evidence of infiltrate, effusion or pneumothorax.                This report was finalized on 1/26/2025 8:09 PM by Dr. Rik Ash M.D on Workstation: [id number]          CT Angiogram Chest Pulmonary Embolism   Final Result   No evidence of pulmonary embolus.       Radiation dose reduction techniques were utilized, including automated   exposure control and exposure modulation based on body size.           This report was finalized on 1/27/2025 2:47 PM by Dr. Salvador Arita M.D on Workstation: [id number]          XR Chest 1 View   Final Result   1. No acute process.           This report was finalized on 1/25/2025 9:57 AM by Dr. Salvador Arita M.D on Workstation: [id number]          CT Head Without Contrast   Final Result   No acute process.               Radiation dose reduction techniques were utilized, including automated   exposure control and exposure modulation based on body size.           This report was finalized on 1/26/2025 9:24 AM by Dr. Salvador Arita M.D on Workstation: [id number]              Assessment & Plan     Active Hospital Problems    Diagnosis     **Syncope     Alcohol withdrawal     Hypertensive emergency     History of atrial fibrillation     Alcohol use     Mood disorder     History of CVA (cerebrovascular accident)        Assessment:  Rectal bleeding  Mental status changes  History of diverticulitis  GERD with history of Lepe's esophagus      Plan:  No further bleeding.  No plans for endoscopic evaluation.  Hemoglobin remains normal.  Known internal hemorrhoids from colonoscopy 2/24.    Continue bowel regimen  Daily PPI given history of GERD and Lepe's  Advance diet as tolerated  We will sign off but are available as needed    I discussed the patients findings and my recommendations with patient and nursing staff.            Margo Youssef M.D.  [facility]  [phone]      Addendum-  Contacted by his nurse today to secondary to had a brown bowel movement with bright red blood on the  toilet tissue.  This is consistent with what would be expected with hemorrhoidal bleeding.  Start Anusol suppositories twice daily x 7 days.  Continue bowel regimen.  We are available as needed

## 2025-01-29 NOTE — PROGRESS NOTES
LOS: 3 days   Patient Care Team:  Akash Rodriguez Jr., DO as PCP - General (Sports Medicine)    Chief Complaint: Follow-up syncope, labile blood pressure.     Interval History: He was sleeping soundly this morning when I saw him.  His behavior was evidently better later this morning.  Blood pressure has been reasonable.    Vital Signs:  Temp:  [98.2 °F (36.8 °C)-100.1 °F (37.8 °C)] 100.1 °F (37.8 °C)  Heart Rate:  [57-77] 77  Resp:  [18-22] 22  BP: (103-145)/() 145/99    Intake/Output Summary (Last 24 hours) at 1/29/2025 1312  Last data filed at 1/29/2025 1200  Gross per 24 hour   Intake 2465.17 ml   Output 2670 ml   Net -204.83 ml       Physical Exam:   General Appearance:    No acute distress, sleeping.   Lungs:     Clear to auscultation bilaterally     Heart:    Regular rhythm and normal rate.  No murmurs, gallops, or  rubs.   Abdomen:     Soft, nontender, nondistended.    Extremities:   No clubbing, cyanosis, or edema.     Results Review:    Results from last 7 days   Lab Units 01/29/25  0525   SODIUM mmol/L 136   POTASSIUM mmol/L 3.9  3.9   CHLORIDE mmol/L 108*   CO2 mmol/L 20.0*   BUN mg/dL 7   CREATININE mg/dL 0.81   GLUCOSE mg/dL 93   CALCIUM mg/dL 7.7*     Results from last 7 days   Lab Units 01/26/25  0609 01/25/25  1034 01/25/25  0928   HSTROP T ng/L <6 <6 <6     Results from last 7 days   Lab Units 01/29/25  0525   WBC 10*3/mm3 9.54   HEMOGLOBIN g/dL 13.3   HEMATOCRIT % 38.9   PLATELETS 10*3/mm3 178         Results from last 7 days   Lab Units 01/26/25  0609   CHOLESTEROL mg/dL 163     Results from last 7 days   Lab Units 01/25/25  0928   MAGNESIUM mg/dL 2.1     Results from last 7 days   Lab Units 01/26/25  0609   CHOLESTEROL mg/dL 163   TRIGLYCERIDES mg/dL 154*   HDL CHOL mg/dL 33*   LDL CHOL mg/dL 103*       I reviewed the patient's new clinical results.        Assessment:  1.  Syncope of uncertain etiology  2.  Severe hypertension with hypertensive emergency on admission  3.  History  of left cerebellar CVA in August 2023  4.  GERD with a history of Lepe's esophagus  5.  Alcohol use with possible current alcohol withdrawal  6.  Paroxysmal atrial fibrillation  7.  Status post loop recorder placement January 2024  8.  Paranoid and aggressive behavior    Plan:  -Paranoid and aggressive behavior evidently was better today.  Neurology has signed off.    -Nothing on loop recorder to explain syncopal events.    -Blood pressure is reasonable on lisinopril 40 mg/day and amlodipine 10 mg/day.  The metoprolol was held because of some bradycardia.  I resumed this at a lower dose of 25 mg twice daily.    -Eliquis has been stopped because of frequent falls. It also evidently was cost prohibitive to the patient in the past.     -Nothing further to add from a cardiology standpoint at this time.  Cardiology will sign off.  Please call back if needed.    Carlos Foster MD  01/29/25  13:12 EST

## 2025-01-29 NOTE — PROGRESS NOTES
[facility]  [phone]  Dr. Pool Nevarez     Subjective:  LOS: 2    Chief Complaint:   Increasing agitation and alcohol withdrawal     Patient complaining of right shoulder and arm pain.  Is continuing to be aggressive and verbally abusive with staff.  Required restraints and a significant amount of sedative medications to keep him controlled.  Again friend at bedside discussed with me that he does not feel patient has been drinking significantly and agrees with a secondary workup for his encephalopathy.    Objective   Vital Signs past 24hrs  Temp range: Temp (24hrs), Av.6 °F (37 °C), Min:98.5 °F (36.9 °C), Max:98.6 °F (37 °C)    BP range: BP: (101-144)/() 133/88  Pulse range: Heart Rate:  [53-67] 63  Resp rate range: Resp:  [20-21] 21  Device (Oxygen Therapy): nasal cannulaFlow (L/min) (Oxygen Therapy):  [1] 1  Oxygen range:SpO2:  [93 %-100 %] 97 %      Physical Exam  Vitals and nursing note reviewed.   Constitutional:       General: He is sleeping. He is not in acute distress.  HENT:      Head: Normocephalic and atraumatic.   Cardiovascular:      Rate and Rhythm: Normal rate and regular rhythm.      Heart sounds: No murmur heard.  Pulmonary:      Effort: Pulmonary effort is normal. No respiratory distress.      Breath sounds: Normal breath sounds. No wheezing, rhonchi or rales.   Abdominal:      General: Abdomen is flat.      Tenderness: There is no abdominal tenderness.   Skin:     General: Skin is warm and dry.      Findings: No rash.   Neurological:      Mental Status: He is lethargic.       Results Review:    I have reviewed the laboratory and imaging data since the last note by [facility] physician.  My annotations are noted in assessment and plan.      Result Review:  I have personally reviewed the results from last note by [facility] physician to 2025 23:08 EST and agree with these findings:  [x]  Laboratory list / accordion  [x]  Microbiology  [x]  Radiology  [x]  EKG/Telemetry   [x]   Cardiology/Vascular   [x]  Pathology  [x]  Old records  []  Other:    Medication Review:  I have reviewed the current MAR.  My annotations are noted in assessment and plan.    amLODIPine, 10 mg, Oral, Daily  aspirin, 81 mg, Oral, Daily  atorvastatin, 40 mg, Oral, Nightly  diazePAM, 2.5 mg, Intravenous, Q6H  folic acid 1 mg in sodium chloride 0.9 % 50 mL IVPB, 1 mg, Intravenous, Daily  lisinopril, 40 mg, Oral, Daily  [Held by provider] metoprolol tartrate, 50 mg, Oral, Q12H  midazolam, , ,   multivitamin with minerals, 1 tablet, Oral, Daily  mupirocin, 1 Application, Each Nare, BID  [START ON 1/29/2025] pantoprazole, 40 mg, Oral, QAM AC  [START ON 1/29/2025] PHENobarbital, 32.4 mg, Oral, Once   Followed by  [START ON 1/29/2025] PHENobarbital, 32.4 mg, Oral, Once  senna-docusate sodium, 2 tablet, Oral, BID  thiamine (B-1) IV, 200 mg, Intravenous, Q8H   Followed by  [START ON 1/31/2025] thiamine, 100 mg, Oral, Daily        dexmedetomidine, 0.2-1.5 mcg/kg/hr, Last Rate: 1.5 mcg/kg/hr (01/28/25 2204)      Lines, Drains & Airways       Active LDAs       Name Placement date Placement time Site Days    Peripheral IV 01/25/25 0930 Left Antecubital 01/25/25  0930  Antecubital  3    Peripheral IV 01/28/25 0124 Posterior;Right Hand 01/28/25  0124  Hand  less than 1    Peripheral IV 01/28/25 1052 Anterior;Right Antecubital 01/28/25  1052  Antecubital  less than 1    Peripheral IV 01/28/25 1052 Left;Posterior Hand 01/28/25  1052  Hand  below thumb  less than 1                  No active isolations  Diet Orders (active) (From admission, onward)       Start     Ordered    01/25/25 1624  Diet: Cardiac; Healthy Heart (2-3 Na+); Fluid Consistency: Thin (IDDSI 0)  Diet Effective Now         01/25/25 1623                      Assessment  Encephalopathy  Alcohol withdrawal with worsening agitation and combativeness  Syncope  Hypertensive emergency  Mood disorder  History of atrial fibrillation  Gastrosoft reflux disease with esophagitis  and Lepe's esophagus  Diverticulosis     Plan  -Patient presenting with syncope and prior history of heavy alcohol use.  Was found to have an alcohol level of 0.  Has been managed his alcohol withdrawal on the floor.  Brought to ICU due to significant agitation and aggressive behavior  - Behavioral health consulted  - Continue Precedex and withdrawal taper  - Monitor blood pressure  - Given family and friends report that he was no longer drinking heavily, will investigate other causes for his encephalopathy and agitation.  CTA head and neck showing nothing acute. MRI ordered and will obtain today. Neurology has been consulted for assistance.       Pool Nevarez DO   01/28/25  23:08 EST      Part of this note may be an electronic transcription/translation of spoken language to printed text using the Dragon Dictation System.

## 2025-01-29 NOTE — NURSING NOTE
"2310: Pt requested to speak to charge RN, states sitter is disrupting his sleep and asked for a different sitter in the room. Pt grew more agitated. Administration of valium per protocol attempted but refused by pt until he has a replacement sitter. Pt was reassured that house supervisor was notified and that we are doing what we can do accommodate. He requested to have his television and all lights on, as he feels uncomfortable having the designated sitter \"watch him sleep.\"     0004: Pt asked to speak w/ charge RN. Sitter, charge, and primary RN at bedside. Pt became tearful and stated he felt remorse for his behavior and apologized. Concerns and anxiety associated w/ hospital admission/illness were acknowledged and pt was reassured he is safe in the hospital. He then agreed that he would like to get sleep tonight and accepted valium administration to help him rest.  "

## 2025-01-30 LAB
ALBUMIN SERPL-MCNC: 3.1 G/DL (ref 3.5–5.2)
ALBUMIN/GLOB SERPL: 1 G/DL
ALP SERPL-CCNC: 74 U/L (ref 39–117)
ALT SERPL W P-5'-P-CCNC: 14 U/L (ref 1–41)
ANION GAP SERPL CALCULATED.3IONS-SCNC: 11.5 MMOL/L (ref 5–15)
AST SERPL-CCNC: 16 U/L (ref 1–40)
BACTERIA UR QL AUTO: ABNORMAL /HPF
BASOPHILS # BLD AUTO: 0.02 10*3/MM3 (ref 0–0.2)
BASOPHILS NFR BLD AUTO: 0.2 % (ref 0–1.5)
BILIRUB SERPL-MCNC: 0.5 MG/DL (ref 0–1.2)
BILIRUB UR QL STRIP: NEGATIVE
BUN SERPL-MCNC: 8 MG/DL (ref 6–20)
BUN/CREAT SERPL: 9.6 (ref 7–25)
CALCIUM SPEC-SCNC: 8.2 MG/DL (ref 8.6–10.5)
CHLORIDE SERPL-SCNC: 107 MMOL/L (ref 98–107)
CLARITY UR: CLEAR
CO2 SERPL-SCNC: 21.5 MMOL/L (ref 22–29)
COLOR UR: YELLOW
CREAT SERPL-MCNC: 0.83 MG/DL (ref 0.76–1.27)
DEPRECATED RDW RBC AUTO: 38.8 FL (ref 37–54)
EGFRCR SERPLBLD CKD-EPI 2021: 102.1 ML/MIN/1.73
EOSINOPHIL # BLD AUTO: 0.25 10*3/MM3 (ref 0–0.4)
EOSINOPHIL NFR BLD AUTO: 2.1 % (ref 0.3–6.2)
ERYTHROCYTE [DISTWIDTH] IN BLOOD BY AUTOMATED COUNT: 13 % (ref 12.3–15.4)
GLOBULIN UR ELPH-MCNC: 3 GM/DL
GLUCOSE SERPL-MCNC: 104 MG/DL (ref 65–99)
GLUCOSE UR STRIP-MCNC: NEGATIVE MG/DL
HCT VFR BLD AUTO: 37.7 % (ref 37.5–51)
HGB BLD-MCNC: 13.1 G/DL (ref 13–17.7)
HGB UR QL STRIP.AUTO: ABNORMAL
HYALINE CASTS UR QL AUTO: ABNORMAL /LPF
IMM GRANULOCYTES # BLD AUTO: 0.05 10*3/MM3 (ref 0–0.05)
IMM GRANULOCYTES NFR BLD AUTO: 0.4 % (ref 0–0.5)
KETONES UR QL STRIP: ABNORMAL
LEUKOCYTE ESTERASE UR QL STRIP.AUTO: ABNORMAL
LYMPHOCYTES # BLD AUTO: 1.38 10*3/MM3 (ref 0.7–3.1)
LYMPHOCYTES NFR BLD AUTO: 11.7 % (ref 19.6–45.3)
MCH RBC QN AUTO: 28.8 PG (ref 26.6–33)
MCHC RBC AUTO-ENTMCNC: 34.7 G/DL (ref 31.5–35.7)
MCV RBC AUTO: 82.9 FL (ref 79–97)
MONOCYTES # BLD AUTO: 1 10*3/MM3 (ref 0.1–0.9)
MONOCYTES NFR BLD AUTO: 8.5 % (ref 5–12)
NEUTROPHILS NFR BLD AUTO: 77.1 % (ref 42.7–76)
NEUTROPHILS NFR BLD AUTO: 9.13 10*3/MM3 (ref 1.7–7)
NITRITE UR QL STRIP: POSITIVE
NRBC BLD AUTO-RTO: 0 /100 WBC (ref 0–0.2)
PH UR STRIP.AUTO: 5.5 [PH] (ref 5–8)
PLATELET # BLD AUTO: 185 10*3/MM3 (ref 140–450)
PMV BLD AUTO: 10.3 FL (ref 6–12)
POTASSIUM SERPL-SCNC: 3.3 MMOL/L (ref 3.5–5.2)
POTASSIUM SERPL-SCNC: 3.4 MMOL/L (ref 3.5–5.2)
PROT SERPL-MCNC: 6.1 G/DL (ref 6–8.5)
PROT UR QL STRIP: NEGATIVE
RBC # BLD AUTO: 4.55 10*6/MM3 (ref 4.14–5.8)
RBC # UR STRIP: ABNORMAL /HPF
REF LAB TEST METHOD: ABNORMAL
SODIUM SERPL-SCNC: 140 MMOL/L (ref 136–145)
SP GR UR STRIP: 1.01 (ref 1–1.03)
SQUAMOUS #/AREA URNS HPF: ABNORMAL /HPF
UROBILINOGEN UR QL STRIP: ABNORMAL
WBC # UR STRIP: ABNORMAL /HPF
WBC NRBC COR # BLD AUTO: 11.83 10*3/MM3 (ref 3.4–10.8)

## 2025-01-30 PROCEDURE — 97166 OT EVAL MOD COMPLEX 45 MIN: CPT

## 2025-01-30 PROCEDURE — 94799 UNLISTED PULMONARY SVC/PX: CPT

## 2025-01-30 PROCEDURE — 25010000002 HYDRALAZINE PER 20 MG: Performed by: INTERNAL MEDICINE

## 2025-01-30 PROCEDURE — 80053 COMPREHEN METABOLIC PANEL: CPT | Performed by: STUDENT IN AN ORGANIZED HEALTH CARE EDUCATION/TRAINING PROGRAM

## 2025-01-30 PROCEDURE — 92523 SPEECH SOUND LANG COMPREHEN: CPT

## 2025-01-30 PROCEDURE — 81001 URINALYSIS AUTO W/SCOPE: CPT | Performed by: STUDENT IN AN ORGANIZED HEALTH CARE EDUCATION/TRAINING PROGRAM

## 2025-01-30 PROCEDURE — 25010000002 DIAZEPAM PER 5 MG: Performed by: STUDENT IN AN ORGANIZED HEALTH CARE EDUCATION/TRAINING PROGRAM

## 2025-01-30 PROCEDURE — 84132 ASSAY OF SERUM POTASSIUM: CPT | Performed by: STUDENT IN AN ORGANIZED HEALTH CARE EDUCATION/TRAINING PROGRAM

## 2025-01-30 PROCEDURE — 85025 COMPLETE CBC W/AUTO DIFF WBC: CPT | Performed by: INTERNAL MEDICINE

## 2025-01-30 PROCEDURE — 87086 URINE CULTURE/COLONY COUNT: CPT | Performed by: STUDENT IN AN ORGANIZED HEALTH CARE EDUCATION/TRAINING PROGRAM

## 2025-01-30 PROCEDURE — 25010000002 THIAMINE PER 100 MG: Performed by: INTERNAL MEDICINE

## 2025-01-30 PROCEDURE — 87186 SC STD MICRODIL/AGAR DIL: CPT | Performed by: STUDENT IN AN ORGANIZED HEALTH CARE EDUCATION/TRAINING PROGRAM

## 2025-01-30 PROCEDURE — 94761 N-INVAS EAR/PLS OXIMETRY MLT: CPT

## 2025-01-30 PROCEDURE — 25010000002 CEFTRIAXONE PER 250 MG: Performed by: INTERNAL MEDICINE

## 2025-01-30 PROCEDURE — 97162 PT EVAL MOD COMPLEX 30 MIN: CPT | Performed by: PHYSICAL THERAPIST

## 2025-01-30 PROCEDURE — 97530 THERAPEUTIC ACTIVITIES: CPT

## 2025-01-30 RX ORDER — POTASSIUM CHLORIDE 750 MG/1
40 TABLET, FILM COATED, EXTENDED RELEASE ORAL EVERY 4 HOURS
Status: COMPLETED | OUTPATIENT
Start: 2025-01-30 | End: 2025-01-30

## 2025-01-30 RX ORDER — QUETIAPINE FUMARATE 25 MG/1
25 TABLET, FILM COATED ORAL EVERY 12 HOURS SCHEDULED
Status: DISCONTINUED | OUTPATIENT
Start: 2025-01-30 | End: 2025-01-31

## 2025-01-30 RX ORDER — POTASSIUM CHLORIDE 1.5 G/1.58G
40 POWDER, FOR SOLUTION ORAL EVERY 4 HOURS
Status: COMPLETED | OUTPATIENT
Start: 2025-01-30 | End: 2025-01-30

## 2025-01-30 RX ADMIN — DEXMEDETOMIDINE HYDROCHLORIDE IN SODIUM CHLORIDE 1.5 MCG/KG/HR: 4 INJECTION INTRAVENOUS at 06:54

## 2025-01-30 RX ADMIN — DEXMEDETOMIDINE HYDROCHLORIDE IN SODIUM CHLORIDE 1.5 MCG/KG/HR: 4 INJECTION INTRAVENOUS at 01:41

## 2025-01-30 RX ADMIN — Medication 1 TABLET: at 08:43

## 2025-01-30 RX ADMIN — QUETIAPINE FUMARATE 25 MG: 25 TABLET, FILM COATED ORAL at 13:21

## 2025-01-30 RX ADMIN — QUETIAPINE FUMARATE 25 MG: 25 TABLET, FILM COATED ORAL at 20:14

## 2025-01-30 RX ADMIN — LISINOPRIL 40 MG: 20 TABLET ORAL at 08:43

## 2025-01-30 RX ADMIN — ESCITALOPRAM 10 MG: 10 TABLET, FILM COATED ORAL at 20:14

## 2025-01-30 RX ADMIN — PANTOPRAZOLE SODIUM 40 MG: 40 TABLET, DELAYED RELEASE ORAL at 08:43

## 2025-01-30 RX ADMIN — ATORVASTATIN CALCIUM 40 MG: 20 TABLET, FILM COATED ORAL at 20:14

## 2025-01-30 RX ADMIN — ACETAMINOPHEN 650 MG: 325 TABLET, FILM COATED ORAL at 13:28

## 2025-01-30 RX ADMIN — DIAZEPAM 10 MG: 5 INJECTION INTRAMUSCULAR; INTRAVENOUS at 08:44

## 2025-01-30 RX ADMIN — HYDROCORTISONE ACETATE 25 MG: 25 SUPPOSITORY RECTAL at 08:43

## 2025-01-30 RX ADMIN — AMLODIPINE BESYLATE 10 MG: 10 TABLET ORAL at 08:43

## 2025-01-30 RX ADMIN — MUPIROCIN 1 APPLICATION: 20 OINTMENT TOPICAL at 20:15

## 2025-01-30 RX ADMIN — POTASSIUM CHLORIDE 40 MEQ: 1.5 POWDER, FOR SOLUTION ORAL at 22:18

## 2025-01-30 RX ADMIN — POTASSIUM CHLORIDE 40 MEQ: 750 TABLET, EXTENDED RELEASE ORAL at 08:43

## 2025-01-30 RX ADMIN — DIAZEPAM 10 MG: 5 INJECTION INTRAMUSCULAR; INTRAVENOUS at 20:13

## 2025-01-30 RX ADMIN — Medication 10 ML: at 20:15

## 2025-01-30 RX ADMIN — THIAMINE HYDROCHLORIDE 200 MG: 100 INJECTION, SOLUTION INTRAMUSCULAR; INTRAVENOUS at 05:06

## 2025-01-30 RX ADMIN — DEXMEDETOMIDINE HYDROCHLORIDE IN SODIUM CHLORIDE 0.8 MCG/KG/HR: 4 INJECTION INTRAVENOUS at 10:35

## 2025-01-30 RX ADMIN — POTASSIUM CHLORIDE 40 MEQ: 750 TABLET, EXTENDED RELEASE ORAL at 13:21

## 2025-01-30 RX ADMIN — Medication 5 MG: at 22:18

## 2025-01-30 RX ADMIN — POTASSIUM CHLORIDE 40 MEQ: 1.5 POWDER, FOR SOLUTION ORAL at 18:18

## 2025-01-30 RX ADMIN — HYDRALAZINE HYDROCHLORIDE 10 MG: 20 INJECTION INTRAMUSCULAR; INTRAVENOUS at 16:57

## 2025-01-30 RX ADMIN — DIAZEPAM 10 MG: 5 TABLET ORAL at 14:12

## 2025-01-30 RX ADMIN — ASPIRIN 81 MG CHEWABLE TABLET 81 MG: 81 TABLET CHEWABLE at 08:43

## 2025-01-30 RX ADMIN — DIAZEPAM 10 MG: 5 INJECTION INTRAMUSCULAR; INTRAVENOUS at 01:41

## 2025-01-30 RX ADMIN — DEXMEDETOMIDINE HYDROCHLORIDE IN SODIUM CHLORIDE 1.5 MCG/KG/HR: 4 INJECTION INTRAVENOUS at 04:17

## 2025-01-30 RX ADMIN — HYDROCORTISONE ACETATE 25 MG: 25 SUPPOSITORY RECTAL at 20:14

## 2025-01-30 RX ADMIN — CEFTRIAXONE SODIUM 1000 MG: 1 INJECTION, POWDER, FOR SOLUTION INTRAMUSCULAR; INTRAVENOUS at 20:15

## 2025-01-30 RX ADMIN — CLONIDINE HYDROCHLORIDE 0.1 MG: 0.1 TABLET ORAL at 18:18

## 2025-01-30 RX ADMIN — FOLIC ACID 1 MG: 1 TABLET ORAL at 08:43

## 2025-01-30 RX ADMIN — THIAMINE HYDROCHLORIDE 200 MG: 100 INJECTION, SOLUTION INTRAMUSCULAR; INTRAVENOUS at 13:21

## 2025-01-30 NOTE — THERAPY EVALUATION
Patient Name: Flako Coreas .  : 1965    MRN: [id number]                              Today's Date: 2025       Admit Date: 2025    Visit Dx:     ICD-10-CM ICD-9-CM   1. Syncope, unspecified syncope type  R55 780.2   2. Hypertensive urgency  I16.0 401.9   3. 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    Anxiety about health    Vertebral artery stenosis, left    Memory loss    History of alcohol dependence    Vasovagal syncope    Elevated lipoprotein(a)    Elevated coronary artery calcium score    PAF (paroxysmal atrial fibrillation)    Syncope    Hypertensive emergency    History of atrial fibrillation    Alcohol use    Mood disorder    Alcohol withdrawal     Past Medical History:   Diagnosis Date    ADHD (attention deficit hyperactivity disorder)     On going issue    Anxiety     Lepe's esophagus     Benign prostatic hyperplasia Surgery in 2021    Brain concussion 2023    Clotting disorder Intestinal    Depression     Diverticulitis     Diverticulosis     Duodenitis     Enteritis     Failure to thrive (child)     Family history of blood clots     Fracture of wrist 1985    Fracture, foot 2019    GERD (gastroesophageal reflux disease)     Hyperlipidemia     Hypertension     Hypertensive emergency     Irritable bowel syndrome 2017    Low testosterone     Microcytosis     Pancreatitis     Pneumonia     PONV (postoperative nausea and vomiting)     Seasonal affective disorder     Shoulder injury     Stroke     Unexplained weight loss     Visual impairment 2023     Past  Surgical History:   Procedure Laterality Date    CARDIAC ELECTROPHYSIOLOGY PROCEDURE N/A 01/24/2024    Procedure: Loop insertion- [employer] LINQ;  Surgeon: Kaela Walker MD;  Location: [facility] CATH INVASIVE LOCATION;  Service: Cardiovascular;  Laterality: N/A;    COLON SURGERY      COLONOSCOPY      COLONOSCOPY N/A 12/11/2022    Procedure: COLONOSCOPY INTO CECUM WITH HOT SNARE POLYPECTOMY;  Surgeon: Albert Gallo MD;  [facility];  Service: Gastroenterology;  Laterality: N/A;  PRE- GI BLEED  POST- DIVERTICULOSIS, POLYP    COLONOSCOPY N/A 02/14/2024    Procedure: COLONOSCOPY into cecum/terminal ileum;  Surgeon: Albert Gallo MD;  Location:  [facility];  Service: Gastroenterology;  Laterality: N/A;  diverticulosis, hemorrhoids    ENDOSCOPY N/A 12/10/2022    Procedure: ESOPHAGOGASTRODUODENOSCOPY;  Surgeon: Albert Gallo MD;  Location:  [facility];  Service: Gastroenterology;  Laterality: N/A;  PRE- DARK STOOLS  POST- BARRETTS ESOPHAGUS    ENDOSCOPY N/A 02/14/2024    Procedure: ESOPHAGOGASTRODUODENOSCOPY with biopsy;  Surgeon: Albert Gallo MD;  [facility];  Service: Gastroenterology;  Laterality: N/A;  long segment wilson's, hiatal hernia    FRACTURE SURGERY Right 1980    for broken arm    FRACTURE SURGERY Right     for broken hand    HAND SURGERY  1990    INCISION AND DRAINAGE ABSCESS  2015    anal    PROSTATE SURGERY      SHOULDER SURGERY  2022    SMALL INTESTINE SURGERY      TONSILLECTOMY      TRIGGER POINT INJECTION  2017      General Information       Row Name 01/30/25 1344          Physical Therapy Time and Intention    Document Type evaluation  -KH     Mode of Treatment individual therapy;physical therapy  -       Row Name 01/30/25 1347          General Information    Patient Profile Reviewed yes  -KH     Prior Level of Function independent:;all household mobility;community mobility  -KH     Existing Precautions/Restrictions fall  -KH     Barriers to Rehab  previous functional deficit;cognitive status  -       Row Name 01/30/25 1344          Living Environment    People in Home alone  -       Row Name 01/30/25 1344          Home Main Entrance    Number of Stairs, Main Entrance eight  -     Stair Railings, Main Entrance railings safe and in good condition  -       Row Name 01/30/25 1344          Cognition    Orientation Status (Cognition) oriented x 3  -       Row Name 01/30/25 1344          Safety Issues/Impairments Affecting Functional Mobility    Impairments Affecting Function (Mobility) endurance/activity tolerance;balance;strength  -               User Key  (r) = Recorded By, (t) = Taken By, (c) = Cosigned By      Initials Name Provider Type    Mariela Fong, PT Physical Therapist                   Mobility       Row Name 01/30/25 1345          Bed Mobility    Bed Mobility supine-sit;sit-supine  -     Supine-Sit [place] (Bed Mobility) standby assist  -     Sit-Supine [place] (Bed Mobility) standby assist  -     Assistive Device (Bed Mobility) head of bed elevated  -       Row Name 01/30/25 1345          Sit-Stand Transfer    Sit-Stand [place] (Transfers) minimum assist (75% patient effort)  -     Assistive Device (Sit-Stand Transfers) --  HHA  -       Row Name 01/30/25 1345          Gait/Stairs (Locomotion)    [place] Level (Gait) minimum assist (75% patient effort)  -     Assistive Device (Gait) --  HHA  -     Distance in Feet (Gait) 10  5 feet/backwards x 2  -     Deviations/Abnormal Patterns (Gait) ataxic;base of support, wide  Exaggerated step length  -     Comment, (Gait/Stairs) Slight posterior loss of balance when stepping backwards  -               User Key  (r) = Recorded By, (t) = Taken By, (c) = Cosigned By      Initials Name Provider Type    Mariela Fong PT Physical Therapist                   Obj/Interventions       Row Name 01/30/25 1346          Range of Motion  Comprehensive    Comment, General Range of Motion BLE WFL  -       Row Name 01/30/25 1346          Strength Comprehensive (MMT)    Comment, General Manual Muscle Testing (MMT) Assessment BLE 5/5 distally, 4/5 proximally  -       Row Name 01/30/25 1346          Balance    Balance Assessment sitting static balance;sitting dynamic balance;standing static balance;standing dynamic balance  -     Static Sitting Balance independent  -     Dynamic Sitting Balance standby assist  -KH     Position, Sitting Balance sitting edge of bed  -KH     Static Standing Balance contact guard  -     Dynamic Standing Balance minimal assist  -KH     Position/Device Used, Standing Balance --  HHA  -       Row Name 01/30/25 1346          Sensory Assessment (Somatosensory)    Sensory Assessment (Somatosensory) --  Patient reports decreased sensation in left hand  -               User Key  (r) = Recorded By, (t) = Taken By, (c) = Cosigned By      Initials Name Provider Type    Mariela Fong, PT Physical Therapist                   Goals/Plan       Row Name 01/30/25 1401          Transfer Goal 1 (PT)    Activity/Assistive Device (Transfer Goal 1, PT) sit-to-stand/stand-to-sit;bed-to-chair/chair-to-bed  -KH     [place] Level/Cues Needed (Transfer Goal 1, PT) standby assist  -KH     Time Frame (Transfer Goal 1, PT) 10 days  -       Row Name 01/30/25 1401          Gait Training Goal 1 (PT)    Activity/Assistive Device (Gait Training Goal 1, PT) gait (walking locomotion)  -     [place] Level (Gait Training Goal 1, PT) standby assist  -KH     Distance (Gait Training Goal 1, PT) 150 ft  -KH     Time Frame (Gait Training Goal 1, PT) 10 days  -       Row Name 01/30/25 1401          Stairs Goal 1 (PT)    Activity/Assistive Device (Stairs Goal 1, PT) stairs, all skills  -     [place] Level/Cues Needed (Stairs Goal 1, PT) contact guard required  -     Number of Stairs (Stairs Goal 1, PT) 8  -KH     Time  Frame (Stairs Goal 1, PT) 10 days  -       Row Name 01/30/25 1405          Therapy Assessment/Plan (PT)    Planned Therapy Interventions (PT) balance training;gait training;home exercise program;patient/family education;strengthening;stair training;transfer training  -               User Key  (r) = Recorded By, (t) = Taken By, (c) = Cosigned By      Initials Name Provider Type     Mariela Butts, PT Physical Therapist                   Clinical Impression       Row Name 01/30/25 1350          Pain    Pretreatment Pain Rating 0/10 - no pain  -     Posttreatment Pain Rating 0/10 - no pain  -       Row Name 01/30/25 1351          Plan of Care Review    Plan of Care Reviewed With patient  -     Outcome Evaluation Patient presented to the ER after a syncopal episode at home.  Patient reports a history of a stroke which affected his left hand and his cognition.  Patient reports he is independently mobile at home, using a walker at times in the middle of the night.  Patient does report occasional falls but states that most of the time he ambulates well, even walking miles at a time.  Patient was alert and oriented x 3 and follows commands well.  He became emotional talking about his recent aggressive behavior during this admission and reports frustration with medical staff.  He would like to be able to DC home when medically stable.  He lives alone and has a son who lives nearby and neighbors who are helpful.  Patient expresses a desire to initiate cognitive therapy with speech therapy as he participated in this in the past prior to a loss of insurance.  Patient presents with proximal weakness in bilateral lower extremities, impaired balance and functional mobility that is below baseline.  Patient would benefit from PT to address these impairments while admitted.  SNF versus home with  PT pending progress.  Notified RN that patient would benefit from cognitive eval with speech as he has requested.   -       Row Name 01/30/25 1350          Therapy Assessment/Plan (PT)    Patient/Family Therapy Goals Statement (PT) Return home to prior level of function  -     Criteria for Skilled Interventions Met (PT) yes  -     Therapy Frequency (PT) 6 times/wk  -       Row Name 01/30/25 1350          Positioning and Restraints    Pre-Treatment Position in bed  no bed alarm activated  -     Post Treatment Position bed  -KH     In Bed fowlers;supine;call light within reach;encouraged to call for assist;side rails up x3  RN in the room at end of session  -               User Key  (r) = Recorded By, (t) = Taken By, (c) = Cosigned By      Initials Name Provider Type    Mariela Fong, PT Physical Therapist                   Outcome Measures       Row Name 01/30/25 1402          How much help from another person do you currently need...    Turning from your back to your side while in flat bed without using bedrails? 4  -KH     Moving from lying on back to sitting on the side of a flat bed without bedrails? 4  -KH     Moving to and from a bed to a chair (including a wheelchair)? 3  -KH     Standing up from a chair using your arms (e.g., wheelchair, bedside chair)? 3  -KH     Climbing 3-5 steps with a railing? 1  -KH     To walk in hospital room? 3  -KH     AM-PAC 6 Clicks Score (PT) 18  -     Highest Level of Mobility Goal 6 --> Walk 10 steps or more  -       Row Name 01/30/25 1402          Functional Assessment    Outcome Measure Options AM-PAC 6 Clicks Basic Mobility (PT)  -               User Key  (r) = Recorded By, (t) = Taken By, (c) = Cosigned By      Initials Name Provider Type    Mariela Fong, PT Physical Therapist                                 Physical Therapy Education       Title: PT OT SLP Therapies (In Progress)       Topic: Physical Therapy (Not Started)       Point: Mobility training (Not Started)       Learner Progress:  Not documented in this visit.              Point:  Home exercise program (Not Started)       Learner Progress:  Not documented in this visit.              Point: Body mechanics (Not Started)       Learner Progress:  Not documented in this visit.              Point: Precautions (Not Started)       Learner Progress:  Not documented in this visit.                                  PT Recommendation and Plan  Planned Therapy Interventions (PT): balance training, gait training, home exercise program, patient/family education, strengthening, stair training, transfer training  Outcome Evaluation: Patient presented to the ER after a syncopal episode at home.  Patient reports a history of a stroke which affected his left hand and his cognition.  Patient reports he is independently mobile at home, using a walker at times in the middle of the night.  Patient does report occasional falls but states that most of the time he ambulates well, even walking miles at a time.  Patient was alert and oriented x 3 and follows commands well.  He became emotional talking about his recent aggressive behavior during this admission and reports frustration with medical staff.  He would like to be able to DC home when medically stable.  He lives alone and has a son who lives nearby and neighbors who are helpful.  Patient expresses a desire to initiate cognitive therapy with speech therapy as he participated in this in the past prior to a loss of insurance.  Patient presents with proximal weakness in bilateral lower extremities, impaired balance and functional mobility that is below baseline.  Patient would benefit from PT to address these impairments while admitted.  SNF versus home with  PT pending progress.  Notified RN that patient would benefit from cognitive eval with speech as he has requested.     Time Calculation:         PT Charges       Row Name 01/30/25 1408             Time Calculation    Start Time 1300  -KH      Stop Time 1322  -KH      Time Calculation (min) 22 min  -KH      PT  Received On 01/30/25  -MOON      PT - Next Appointment 01/31/25  -MOON                User Key  (r) = Recorded By, (t) = Taken By, (c) = Cosigned By      Initials Name Provider Type    Mariela Fong, PT Physical Therapist                  Therapy Charges for Today       Code Description Service Date Service Provider Modifiers Qty    [id number] HC PT EVAL MOD COMPLEXITY 3 1/30/2025 Mariela Butts, PT GP 1            PT G-Codes  Outcome Measure Options: AM-PAC 6 Clicks Basic Mobility (PT)  AM-PAC 6 Clicks Score (PT): 18  PT Discharge Summary  Anticipated Discharge Disposition (PT): home with home health, home, skilled nursing facility    Mariela Butts, PT  1/30/2025

## 2025-01-30 NOTE — THERAPY EVALUATION
Patient Name: Flako Coreas .  : 1965    MRN: [id number]                              Today's Date: 2025       Admit Date: 2025    Visit Dx:     ICD-10-CM ICD-9-CM   1. Syncope, unspecified syncope type  R55 780.2   2. Hypertensive urgency  I16.0 401.9   3. 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    Anxiety about health    Vertebral artery stenosis, left    Memory loss    History of alcohol dependence    Vasovagal syncope    Elevated lipoprotein(a)    Elevated coronary artery calcium score    PAF (paroxysmal atrial fibrillation)    Syncope    Hypertensive emergency    History of atrial fibrillation    Alcohol use    Mood disorder    Alcohol withdrawal     Past Medical History:   Diagnosis Date    ADHD (attention deficit hyperactivity disorder)     On going issue    Anxiety     Lepe's esophagus     Benign prostatic hyperplasia Surgery in 2021    Brain concussion 2023    Clotting disorder Intestinal    Depression     Diverticulitis     Diverticulosis     Duodenitis     Enteritis     Failure to thrive (child)     Family history of blood clots     Fracture of wrist 1985    Fracture, foot 2019    GERD (gastroesophageal reflux disease)     Hyperlipidemia     Hypertension     Hypertensive emergency     Irritable bowel syndrome 2017    Low testosterone     Microcytosis     Pancreatitis     Pneumonia     PONV (postoperative nausea and vomiting)     Seasonal affective disorder     Shoulder injury     Stroke     Unexplained weight loss     Visual impairment 2023     Past  Surgical History:   Procedure Laterality Date    CARDIAC ELECTROPHYSIOLOGY PROCEDURE N/A 01/24/2024    Procedure: Loop insertion- [employer] LINQ;  Surgeon: Kaela Walker MD;  Location: [facility] CATH INVASIVE LOCATION;  Service: Cardiovascular;  Laterality: N/A;    COLON SURGERY      COLONOSCOPY      COLONOSCOPY N/A 12/11/2022    Procedure: COLONOSCOPY INTO CECUM WITH HOT SNARE POLYPECTOMY;  Surgeon: Albetr Gallo MD;  [facility];  Service: Gastroenterology;  Laterality: N/A;  PRE- GI BLEED  POST- DIVERTICULOSIS, POLYP    COLONOSCOPY N/A 02/14/2024    Procedure: COLONOSCOPY into cecum/terminal ileum;  Surgeon: Albert Gallo MD;  Location:  [facility];  Service: Gastroenterology;  Laterality: N/A;  diverticulosis, hemorrhoids    ENDOSCOPY N/A 12/10/2022    Procedure: ESOPHAGOGASTRODUODENOSCOPY;  Surgeon: Albert Gallo MD;  Location:  [facility];  Service: Gastroenterology;  Laterality: N/A;  PRE- DARK STOOLS  POST- BARRETTS ESOPHAGUS    ENDOSCOPY N/A 02/14/2024    Procedure: ESOPHAGOGASTRODUODENOSCOPY with biopsy;  Surgeon: Albert Gallo MD;  [facility];  Service: Gastroenterology;  Laterality: N/A;  long segment wilson's, hiatal hernia    FRACTURE SURGERY Right 1980    for broken arm    FRACTURE SURGERY Right     for broken hand    HAND SURGERY  1990    INCISION AND DRAINAGE ABSCESS  2015    anal    PROSTATE SURGERY      SHOULDER SURGERY  2022    SMALL INTESTINE SURGERY      TONSILLECTOMY      TRIGGER POINT INJECTION  2017      General Information       Row Name 01/30/25 1556          OT Time and Intention    Subjective Information no complaints  -RB     Document Type evaluation  -RB     Mode of Treatment individual therapy;occupational therapy  -RB     Patient Effort adequate  -RB       Row Name 01/30/25 1559          General Information    Patient Profile Reviewed yes  -RB     Prior Level of Function independent:;ADL's;transfer  the pt reports cognitive deficits  limiting his independence and he reports he spends the majority of his time at home.  -RB     Existing Precautions/Restrictions fall  -RB     Barriers to Rehab medically complex;previous functional deficit;cognitive status  -RB       Row Name 01/30/25 1556          Living Environment    People in Home alone  -RB       Row Name 01/30/25 1556          Home Main Entrance    Number of Stairs, Main Entrance eight  -RB     Stair Railings, Main Entrance railings safe and in good condition  -RB       Row Name 01/30/25 1556          Cognition    Orientation Status (Cognition) oriented x 3  -RB       Row Name 01/30/25 1556          Safety Issues/Impairments Affecting Functional Mobility    Safety Issues Affecting Function (Mobility) safety precaution awareness;safety precautions follow-through/compliance;insight into deficits/self-awareness;judgment;problem-solving;impulsivity;ability to follow commands  -RB     Impairments Affecting Function (Mobility) balance;cognition;endurance/activity tolerance;coordination;strength  -RB               User Key  (r) = Recorded By, (t) = Taken By, (c) = Cosigned By      Initials Name Provider Type    RB Josee Bryant OT Occupational Therapist                     Mobility/ADL's       Row Name 01/30/25 1559          Bed Mobility    Bed Mobility supine-sit;sit-supine  -RB     Supine-Sit [place] (Bed Mobility) standby assist  -RB     Sit-Supine [place] (Bed Mobility) standby assist  -RB     Assistive Device (Bed Mobility) bed rails  -RB       Row Name 01/30/25 1554          Transfers    Transfers sit-stand transfer;stand-sit transfer  -RB     Comment, (Transfers) the pt stood twice but demonstrated impulsivity which made keeping him on his feel a safety issue. He demonstrated difficulty following multi step commands.  -RB       Row Name 01/30/25 1550          Sit-Stand Transfer    Sit-Stand [place] (Transfers) minimum assist (75% patient effort);1 person assist;verbal cues   -RB     Comment, (Sit-Stand Transfer) hha x1  -RB       Row Name 01/30/25 1557          Stand-Sit Transfer    Stand-Sit [place] (Transfers) minimum assist (75% patient effort);1 person assist;verbal cues  -RB     Comment, (Stand-Sit Transfer) hha x 1  -RB       Row Name 01/30/25 1557          Functional Mobility    Functional Mobility- Comment the pt took steps forwards and backwards with HHA x1 - impulsive. difficulty following directional turns.  -RB       Row Name 01/30/25 1557          Activities of Daily Living    BADL Assessment/Intervention lower body dressing  he reports no deficits with ADL's, however, due to his cognitive status and his balance deficits it appears the pt will demonstrate difficulty with ADL's once completing them from OOB  -RB       Row Name 01/30/25 1557          Lower Body Dressing Assessment/Training    [place] Level (Lower Body Dressing) lower body dressing skills;set up  -RB     Position (Lower Body Dressing) edge of bed sitting  -RB               User Key  (r) = Recorded By, (t) = Taken By, (c) = Cosigned By      Initials Name Provider Type    RB Josee Bryant OT Occupational Therapist                   Obj/Interventions       Row Name 01/30/25 1552          Sensory Assessment (Somatosensory)    Sensory Assessment L hand distal digits 3-5 numb/tingling  -RB       Row Name 01/30/25 1556          Vision Assessment/Intervention    Visual Impairment/Limitations WFL  -RB       Row Name 01/30/25 1558          Range of Motion Comprehensive    General Range of Motion no range of motion deficits identified  -RB       Row Name 01/30/25 1550          Strength Comprehensive (MMT)    Comment, General Manual Muscle Testing (MMT) Assessment 4+/5 BUE  -RB       Row Name 01/30/25 1551          Motor Skills    Motor Skills coordination  -RB     Coordination --  The pt demontrated ataxic movements once in a standing position. He was uncoordinated with taking steps.  -RB       Row Name  01/30/25 1559          Balance    Comment, Balance SBA sitting balance. Min-Mod A for standing balance due to his impulsivity causing a loss in balance  -RB               User Key  (r) = Recorded By, (t) = Taken By, (c) = Cosigned By      Initials Name Provider Type    Josee Peralta, SERA Occupational Therapist                   Goals/Plan       Row Name 01/30/25 1601          Bed Mobility Goal 1 (OT)    Activity/Assistive Device (Bed Mobility Goal 1, OT) bed mobility activities, all  -RB     [place] Level/Cues Needed (Bed Mobility Goal 1, OT) modified independence  -RB     Time Frame (Bed Mobility Goal 1, OT) 2 weeks;short term goal (STG)  -RB     Progress/Outcomes (Bed Mobility Goal 1, OT) new goal  -RB       Row Name 01/30/25 1601          Transfer Goal 1 (OT)    Activity/Assistive Device (Transfer Goal 1, OT) transfers, all  -RB     [place] Level/Cues Needed (Transfer Goal 1, OT) modified independence  -RB     Time Frame (Transfer Goal 1, OT) short term goal (STG);2 weeks  -RB     Progress/Outcome (Transfer Goal 1, OT) new goal  -RB       Row Name 01/30/25 1601          Bathing Goal 1 (OT)    Activity/Device (Bathing Goal 1, OT) bathing skills, all  -RB     [place] Level/Cues Needed (Bathing Goal 1, OT) modified independence  -RB     Time Frame (Bathing Goal 1, OT) short term goal (STG);2 weeks  -RB     Progress/Outcomes (Bathing Goal 1, OT) new goal  -RB       Row Name 01/30/25 1601          Dressing Goal 1 (OT)    Activity/Device (Dressing Goal 1, OT) dressing skills, all  -RB     [place]/Cues Needed (Dressing Goal 1, OT) modified independence  -RB     Time Frame (Dressing Goal 1, OT) short term goal (STG);2 weeks  -RB     Progress/Outcome (Dressing Goal 1, OT) new goal  -RB       Row Name 01/30/25 1601          Toileting Goal 1 (OT)    Activity/Device (Toileting Goal 1, OT) toileting skills, all  -RB     [place] Level/Cues Needed (Toileting Goal 1, OT) modified independence  -RB      Time Frame (Toileting Goal 1, OT) short term goal (STG);2 weeks  -RB     Progress/Outcome (Toileting Goal 1, OT) new goal  -RB       Row Name 01/30/25 1601          Grooming Goal 1 (OT)    Activity/Device (Grooming Goal 1, OT) grooming skills, all  -RB     [place] (Grooming Goal 1, OT) modified independence  -RB     Time Frame (Grooming Goal 1, OT) short term goal (STG);2 weeks  -RB     Progress/Outcome (Grooming Goal 1, OT) new goal  -RB       Row Name 01/30/25 1601          Therapy Assessment/Plan (OT)    Planned Therapy Interventions (OT) transfer/mobility retraining;strengthening exercise;ROM/therapeutic exercise;activity tolerance training;adaptive equipment training;BADL retraining;functional balance retraining;occupation/activity based interventions;patient/caregiver education/training;cognitive/visual perception retraining;neuromuscular control/coordination retraining  -RB               User Key  (r) = Recorded By, (t) = Taken By, (c) = Cosigned By      Initials Name Provider Type    RB Josee Bryant, SERA Occupational Therapist                   Clinical Impression       Row Name 01/30/25 1600          Pain Assessment    Pretreatment Pain Rating 0/10 - no pain  -RB     Posttreatment Pain Rating 0/10 - no pain  -RB       Row Name 01/30/25 1600          Plan of Care Review    Plan of Care Reviewed With patient  -RB     Progress no change  -RB     Outcome Evaluation The pt was admitted to [facility] 2/2 to a syncopal event. Dx includes ETOH w/d, HTN emergency. Pmhx significant for a stroke in fall 2023, Afib, mood disorder and alcohol abuse. The pt reports living alone and is independent with ADL's and mobility. He owns a walker but does not use daily. The pt reports his main deficit since his prior stroke is his cognitive deficits - he has extreme difficulty with home maintenance/IADL's, finding community resources and higher level cognitive tasks. The pt was oriented and pleasant this afternoon. CARMEN  provided for bed mobility. SBA donning his LB socks. He stood with CGA and impulsively took steps towards his doorway - he was unsteady and Min A was provided to keep him at midline. He took a sitting rest break. OT instructed to have the pt take side steps along the EOB but he again quickly attempted steps towards the door, forward LOB where Min-Mod A was provided to keep his upright balance. The pt became emotional on his current functional level and requested to return to supine to sleep. He returned to supine with SBA. He may benefit from continued skilled inpatient OT services with a d/c to rehab vs. Home with HH pending progress.  -RB       Row Name 01/30/25 1600          Therapy Assessment/Plan (OT)    Rehab Potential (OT) good  -RB     Criteria for Skilled Therapeutic Interventions Met (OT) yes;skilled treatment is necessary  -RB     Therapy Frequency (OT) 5 times/wk  -RB       Row Name 01/30/25 1600          Therapy Plan Review/Discharge Plan (OT)    Anticipated Discharge Disposition (OT) home with 24/7 care;home with home health;inpatient rehabilitation facility  pending progress and available resourses - recently obtained insurance  -RB       Row Name 01/30/25 1600          Vital Signs    Pretreatment Heart Rate (beats/min) 103  -RB     Intratreatment Heart Rate (beats/min) 122  -RB     Posttreatment Heart Rate (beats/min) 106  -RB     Pre Patient Position Supine  -RB     Intra Patient Position Standing  -RB     Post Patient Position Supine  -RB       Row Name 01/30/25 1600          Positioning and Restraints    Pre-Treatment Position in bed  -RB     Post Treatment Position bed  -RB     In Bed notified nsg;supine;encouraged to call for assist;exit alarm on;fowlers;call light within reach  -RB               User Key  (r) = Recorded By, (t) = Taken By, (c) = Cosigned By      Initials Name Provider Type    Josee Peralta, OT Occupational Therapist                   Outcome Measures       Row Name  01/30/25 1602          How much help from another is currently needed...    Putting on and taking off regular lower body clothing? 3  -RB     Bathing (including washing, rinsing, and drying) 2  -RB     Toileting (which includes using toilet bed pan or urinal) 2  -RB     Putting on and taking off regular upper body clothing 3  -RB     Taking care of personal grooming (such as brushing teeth) 3  -RB     Eating meals 3  -RB     AM-PAC 6 Clicks Score (OT) 16  -RB       Row Name 01/30/25 1402 01/30/25 0800       How much help from another person do you currently need...    Turning from your back to your side while in flat bed without using bedrails? 4  -KH 3  -MG    Moving from lying on back to sitting on the side of a flat bed without bedrails? 4  -KH 3  -MG    Moving to and from a bed to a chair (including a wheelchair)? 3  -KH 3  -MG    Standing up from a chair using your arms (e.g., wheelchair, bedside chair)? 3  -KH 3  -MG    Climbing 3-5 steps with a railing? 1  -KH 3  -MG    To walk in hospital room? 3  -KH 3  -MG    AM-PAC 6 Clicks Score (PT) 18  -KH 18  -MG    Highest Level of Mobility Goal 6 --> Walk 10 steps or more  -KH 6 --> Walk 10 steps or more  -MG      Row Name 01/30/25 1602          Modified Candido Scale    Modified Hitchcock Scale 4 - Moderately severe disability.  Unable to walk without assistance, and unable to attend to own bodily needs without assistance.  -       Row Name 01/30/25 1602 01/30/25 1402       Functional Assessment    Outcome Measure Options AM-PAC 6 Clicks Daily Activity (OT);Modified [place]  -RB AM-PAC 6 Clicks Basic Mobility (PT)  -KH              User Key  (r) = Recorded By, (t) = Taken By, (c) = Cosigned By      Initials Name Provider Type    Mariela Fong, PT Physical Therapist    RB Josee Bryant, OT Occupational Therapist    MG Halie Motley, RN Registered Nurse                    Occupational Therapy Education       Title: PT OT SLP Therapies (In Progress)        Topic: Occupational Therapy (Not Started)       Point: ADL training (Not Started)       Description:   Instruct learner(s) on proper safety adaptation and remediation techniques during self care or transfers.   Instruct in proper use of assistive devices.                  Learner Progress:  Not documented in this visit.              Point: Home exercise program (Not Started)       Description:   Instruct learner(s) on appropriate technique for monitoring, assisting and/or progressing therapeutic exercises/activities.                  Learner Progress:  Not documented in this visit.              Point: Precautions (Not Started)       Description:   Instruct learner(s) on prescribed precautions during self-care and functional transfers.                  Learner Progress:  Not documented in this visit.              Point: Body mechanics (Not Started)       Description:   Instruct learner(s) on proper positioning and spine alignment during self-care, functional mobility activities and/or exercises.                  Learner Progress:  Not documented in this visit.                                  OT Recommendation and Plan  Planned Therapy Interventions (OT): transfer/mobility retraining, strengthening exercise, ROM/therapeutic exercise, activity tolerance training, adaptive equipment training, BADL retraining, functional balance retraining, occupation/activity based interventions, patient/caregiver education/training, cognitive/visual perception retraining, neuromuscular control/coordination retraining  Therapy Frequency (OT): 5 times/wk  Plan of Care Review  Plan of Care Reviewed With: patient  Progress: no change  Outcome Evaluation: The pt was admitted to [facility] 2/2 to a syncopal event. Dx includes ETOH w/d, HTN emergency. Pmhx significant for a stroke in fall 2023, Afib, mood disorder and alcohol abuse. The pt reports living alone and is independent with ADL's and mobility. He owns a walker but does not use daily.  The pt reports his main deficit since his prior stroke is his cognitive deficits - he has extreme difficulty with home maintenance/IADL's, finding community resources and higher level cognitive tasks. The pt was oriented and pleasant this afternoon. SBA provided for bed mobility. SBA donning his LB socks. He stood with CGA and impulsively took steps towards his doorway - he was unsteady and Min A was provided to keep him at midline. He took a sitting rest break. OT instructed to have the pt take side steps along the EOB but he again quickly attempted steps towards the door, forward LOB where Min-Mod A was provided to keep his upright balance. The pt became emotional on his current functional level and requested to return to supine to sleep. He returned to supine with SBA. He may benefit from continued skilled inpatient OT services with a d/c to rehab vs. Home with HH pending progress.     Time Calculation:   Evaluation Complexity (OT)  Review Occupational Profile/Medical/Therapy History Complexity: expanded/moderate complexity  Assessment, Occupational Performance/Identification of Deficit Complexity: 5 or more performance deficits  Clinical Decision Making Complexity (OT): detailed assessment/moderate complexity  Overall Complexity of Evaluation (OT): moderate complexity     Time Calculation- OT       Row Name 01/30/25 1555 01/30/25 1520          Time Calculation- OT    OT Start Time 1520  -RB --     OT Stop Time 1546  -RB --     OT Time Calculation (min) 26 min  -RB --     Total Timed Code Minutes- OT 10 minute(s)  -RB --     OT Received On 01/30/25  -RB --     OT - Next Appointment 01/31/25  -RB 01/31/25  -RB     OT Goal Re-Cert Due Date 02/13/25  -RB --        Timed Charges    16124 - OT Therapeutic Activity Minutes 10  -RB --        Untimed Charges    OT Eval/Re-eval Minutes 16  -RB --        Total Minutes    Timed Charges Total Minutes 10  -RB --     Untimed Charges Total Minutes 16  -RB --      Total Minutes  26  -RB --               User Key  (r) = Recorded By, (t) = Taken By, (c) = Cosigned By      Initials Name Provider Type    Josee Peralta OT Occupational Therapist                  Therapy Charges for Today       Code Description Service Date Service Provider Modifiers Qty    [id number]  OT THERAPEUTIC ACT EA 15 MIN 1/30/2025 Josee Bryant OT GO 1    [id number]  OT EVAL MOD COMPLEXITY 3 1/30/2025 Josee Bryant OT GO 1                 Josee Bryant OT  1/30/2025

## 2025-01-30 NOTE — PLAN OF CARE
Goal Outcome Evaluation:              Outcome Evaluation: Cognitive-linguistic evaluation completed per pt request. Pt reports worsening cognitive deficits since 2023 CVA. Presents this date with suspected mild cognitive impairment characterized by decreased temporal orientation, short-term memory, and mental flexibility. Prolonged processing speeds also noted. Tearful during assessment. Strengths noted in functional problem solving and thought organization. Discussed recommendation to follow with speech therapy at next level of care (HH vs OP) for ongoing cognitive treatment. Pt verbalized understanding and eager to participate in therapy.    Anticipated Discharge Disposition (SLP): home with [facility]

## 2025-01-30 NOTE — PROGRESS NOTES
The patient is undergoing a procedure and is unavailable for interview today.  Continuing to follow.

## 2025-01-30 NOTE — PLAN OF CARE
Goal Outcome Evaluation:  Plan of Care Reviewed With: patient           Outcome Evaluation: Patient presented to the ER after a syncopal episode at home.  Patient reports a history of a stroke which affected his left hand and his cognition.  Patient reports he is independently mobile at home, using a walker at times in the middle of the night.  Patient does report occasional falls but states that most of the time he ambulates well, even walking miles at a time.  Patient was alert and oriented x 3 and follows commands well.  He became emotional talking about his recent aggressive behavior during this admission and reports frustration with medical staff.  He would like to be able to DC home when medically stable.  He lives alone and has a son who lives nearby and neighbors who are helpful.  Patient expresses a desire to initiate cognitive therapy with speech therapy as he participated in this in the past prior to a loss of insurance.  Patient presents with proximal weakness in bilateral lower extremities, impaired balance and functional mobility that is below baseline.  Patient would benefit from PT to address these impairments while admitted.  SNF versus home with  PT pending progress.  Notified RN that patient would benefit from cognitive eval with speech as he has requested.    Anticipated Discharge Disposition (PT): home with home health, home, skilled nursing facility

## 2025-01-30 NOTE — PLAN OF CARE
The pt was admitted to [facility] 2/2 to a syncopal event. Dx includes ETOH w/d, HTN emergency. Pmhx significant for a stroke in fall 2023, Afib, mood disorder and alcohol abuse. The pt reports living alone and is independent with ADL's and mobility. He owns a walker but does not use daily. The pt reports his main deficit since his prior stroke is his cognitive deficits - he has extreme difficulty with home maintenance/IADL's, finding community resources and higher level cognitive tasks. The pt was oriented and pleasant this afternoon. SBA provided for bed mobility. SBA donning his LB socks. He stood with CGA and impulsively took steps towards his doorway - he was unsteady and Min A was provided to keep him at midline. He took a sitting rest break. OT instructed to have the pt take side steps along the EOB but he again quickly attempted steps towards the door, forward LOB where Min-Mod A was provided to keep his upright balance. The pt became emotional on his current functional level and requested to return to supine to sleep. He returned to supine with SBA. He may benefit from continued skilled inpatient OT services with a d/c to rehab vs. Home with HH pending progress.

## 2025-01-30 NOTE — PLAN OF CARE
Goal Outcome Evaluation:         Safety and comfort maintained. Pt attempted to wear CPAP tonight but had some difficulty with getting used to wearing the mask. Pt vocal about anxiety r/t breathing and cpap mask, valium given x1 per CIWA protocol and reported anxiety.

## 2025-01-30 NOTE — CONSULTS
Chap visited pt.  Pt expressed sadness.  Pt described having heart issues and having a stroke and not being an alcoholic.  Pt repeated himself several times, and did not remember doing so.  Pt asked about churches in the area.  Chap suggested a couple based on pt's previous experiences in [employer].  Pt expressed thanks for support.  Chap remains available.

## 2025-01-30 NOTE — PROGRESS NOTES
"DAILY PROGRESS NOTE  [facility]    Patient Identification:  Name: Flako Coreas Sr.  Age: 59 y.o.  Sex: male  :  1965  MRN: [id number]         Primary Care Physician: Akash Rodriguez Jr., DO    Subjective:  Interval History: He is off the Precedex drip but seems a little confused and some paranoid behavior.    Objective:    Scheduled Meds:amLODIPine, 10 mg, Oral, Daily  aspirin, 81 mg, Oral, Daily  atorvastatin, 40 mg, Oral, Nightly  escitalopram, 10 mg, Oral, Nightly  folic acid, 1 mg, Oral, Daily  hydrocortisone, 25 mg, Rectal, BID  lisinopril, 40 mg, Oral, Daily  [Held by provider] metoprolol tartrate, 25 mg, Oral, Q12H  multivitamin with minerals, 1 tablet, Oral, Daily  mupirocin, 1 Application, Each Nare, BID  pantoprazole, 40 mg, Oral, QAM AC  QUEtiapine, 25 mg, Oral, Q12H  senna-docusate sodium, 2 tablet, Oral, BID  [START ON 2025] thiamine, 100 mg, Oral, Daily      Continuous Infusions:     Vital signs in last 24 hours:  Temp:  [97.8 °F (36.6 °C)-99.3 °F (37.4 °C)] 99.3 °F (37.4 °C)  Heart Rate:  [] 112  Resp:  [16-18] 16  BP: ()/() 191/96    Intake/Output:    Intake/Output Summary (Last 24 hours) at 2025 1621  Last data filed at 2025 0417  Gross per 24 hour   Intake 916 ml   Output 700 ml   Net 216 ml       Exam:  BP (!) 191/96   Pulse 112   Temp 99.3 °F (37.4 °C) (Oral)   Resp 16   Ht 188 cm (74\")   Wt 103 kg (227 lb 4.7 oz)   SpO2 95%   BMI 29.18 kg/m²     General Appearance:    Alert, cooperative, no distress   Head:    Normocephalic, without obvious abnormality, atraumatic   Eyes:       Throat:   Lips, tongue, gums normal   Neck:   Supple, symmetrical, trachea midline, no JVD   Lungs:     Clear to auscultation bilaterally, respirations unlabored   Chest Wall:    No tenderness or deformity    Heart:    Regular rate and rhythm, S1 and S2 normal, no murmur,no  rub or gallop   Abdomen:     Soft, nontender, bowel sounds active, no " masses, no organomegaly    Extremities:   Extremities normal, atraumatic, no cyanosis or edema   Pulses:      Skin:   Skin is warm and dry,  no rashes or palpable lesions   Neurologic:   no focal deficits noted      Lab Results (last 72 hours)       Procedure Component Value Units Date/Time    Comprehensive Metabolic Panel [[id number]]  (Abnormal) Collected: 01/30/25 0516    Specimen: Blood Updated: 01/30/25 0558     Glucose 104 mg/dL      BUN 8 mg/dL      Creatinine 0.83 mg/dL      Sodium 140 mmol/L      Potassium 3.4 mmol/L      Comment: Slight hemolysis detected by analyzer. Result may be falsely elevated.        Chloride 107 mmol/L      CO2 21.5 mmol/L      Calcium 8.2 mg/dL      Total Protein 6.1 g/dL      Albumin 3.1 g/dL      ALT (SGPT) 14 U/L      AST (SGOT) 16 U/L      Comment: Slight hemolysis detected by analyzer. Result may be falsely elevated.        Alkaline Phosphatase 74 U/L      Total Bilirubin 0.5 mg/dL      Globulin 3.0 gm/dL      A/G Ratio 1.0 g/dL      BUN/Creatinine Ratio 9.6     Anion Gap 11.5 mmol/L      eGFR 102.1 mL/min/1.73     Narrative:      GFR Categories in Chronic Kidney Disease (CKD)      GFR Category          GFR (mL/min/1.73)    Interpretation  G1                     90 or greater         Normal or high (1)  G2                      60-89                Mild decrease (1)  G3a                   45-59                Mild to moderate decrease  G3b                   30-44                Moderate to severe decrease  G4                    15-29                Severe decrease  G5                    14 or less           Kidney failure          (1)In the absence of evidence of kidney disease, neither GFR category G1 or G2 fulfill the criteria for CKD.    eGFR calculation 2021 CKD-EPI creatinine equation, which does not include race as a factor    CBC & Differential [[id number]]  (Abnormal) Collected: 01/30/25 0516    Specimen: Blood Updated: 01/30/25 0532    Narrative:      The following  orders were created for panel order CBC & Differential.  Procedure                               Abnormality         Status                     ---------                               -----------         ------                     CBC Auto Differential[[id number]]        Abnormal            Final result                 Please view results for these tests on the individual orders.    CBC Auto Differential [[id number]]  (Abnormal) Collected: 01/30/25 0516    Specimen: Blood Updated: 01/30/25 0532     WBC 11.83 10*3/mm3      RBC 4.55 10*6/mm3      Hemoglobin 13.1 g/dL      Hematocrit 37.7 %      MCV 82.9 fL      MCH 28.8 pg      MCHC 34.7 g/dL      RDW 13.0 %      RDW-SD 38.8 fl      MPV 10.3 fL      Platelets 185 10*3/mm3      Neutrophil % 77.1 %      Lymphocyte % 11.7 %      Monocyte % 8.5 %      Eosinophil % 2.1 %      Basophil % 0.2 %      Immature Grans % 0.4 %      Neutrophils, Absolute 9.13 10*3/mm3      Lymphocytes, Absolute 1.38 10*3/mm3      Monocytes, Absolute 1.00 10*3/mm3      Eosinophils, Absolute 0.25 10*3/mm3      Basophils, Absolute 0.02 10*3/mm3      Immature Grans, Absolute 0.05 10*3/mm3      nRBC 0.0 /100 WBC     Comprehensive Metabolic Panel [[id number]]  (Abnormal) Collected: 01/29/25 0525    Specimen: Blood Updated: 01/29/25 0640     Glucose 93 mg/dL      BUN 7 mg/dL      Creatinine 0.81 mg/dL      Sodium 136 mmol/L      Potassium 3.9 mmol/L      Comment: Slight hemolysis detected by analyzer. Result may be falsely elevated.        Chloride 108 mmol/L      CO2 20.0 mmol/L      Calcium 7.7 mg/dL      Total Protein 5.5 g/dL      Albumin 3.1 g/dL      ALT (SGPT) 17 U/L      AST (SGOT) 17 U/L      Comment: Slight hemolysis detected by analyzer. Result may be falsely elevated.        Alkaline Phosphatase 68 U/L      Total Bilirubin 0.7 mg/dL      Globulin 2.4 gm/dL      A/G Ratio 1.3 g/dL      BUN/Creatinine Ratio 8.6     Anion Gap 8.0 mmol/L      eGFR 102.8 mL/min/1.73     Narrative:      GFR Categories  in Chronic Kidney Disease (CKD)      GFR Category          GFR (mL/min/1.73)    Interpretation  G1                     90 or greater         Normal or high (1)  G2                      60-89                Mild decrease (1)  G3a                   45-59                Mild to moderate decrease  G3b                   30-44                Moderate to severe decrease  G4                    15-29                Severe decrease  G5                    14 or less           Kidney failure          (1)In the absence of evidence of kidney disease, neither GFR category G1 or G2 fulfill the criteria for CKD.    eGFR calculation 2021 CKD-EPI creatinine equation, which does not include race as a factor    Potassium [[id number]]  (Normal) Collected: 01/29/25 0525    Specimen: Blood Updated: 01/29/25 0639     Potassium 3.9 mmol/L      Comment: Slight hemolysis detected by analyzer. Result may be falsely elevated.       CBC & Differential [[id number]]  (Abnormal) Collected: 01/29/25 0525    Specimen: Blood Updated: 01/29/25 0559    Narrative:      The following orders were created for panel order CBC & Differential.  Procedure                               Abnormality         Status                     ---------                               -----------         ------                     CBC Auto Differential[[id number]]        Abnormal            Final result                 Please view results for these tests on the individual orders.    CBC Auto Differential [[id number]]  (Abnormal) Collected: 01/29/25 0525    Specimen: Blood Updated: 01/29/25 0559     WBC 9.54 10*3/mm3      RBC 4.65 10*6/mm3      Hemoglobin 13.3 g/dL      Hematocrit 38.9 %      MCV 83.7 fL      MCH 28.6 pg      MCHC 34.2 g/dL      RDW 13.3 %      RDW-SD 40.5 fl      MPV 10.3 fL      Platelets 178 10*3/mm3      Neutrophil % 72.6 %      Lymphocyte % 13.3 %      Monocyte % 10.6 %      Eosinophil % 3.0 %      Basophil % 0.2 %      Immature Grans % 0.3 %      Neutrophils,  Absolute 6.92 10*3/mm3      Lymphocytes, Absolute 1.27 10*3/mm3      Monocytes, Absolute 1.01 10*3/mm3      Eosinophils, Absolute 0.29 10*3/mm3      Basophils, Absolute 0.02 10*3/mm3      Immature Grans, Absolute 0.03 10*3/mm3      nRBC 0.0 /100 WBC     Potassium [[id number]]  (Normal) Collected: 01/28/25 1511    Specimen: Blood Updated: 01/28/25 1536     Potassium 3.6 mmol/L     Vitamin B12 [[id number]]  (Normal) Collected: 01/28/25 1109    Specimen: Blood Updated: 01/28/25 1159     Vitamin B-12 568 pg/mL     Narrative:      Results may be falsely increased if patient taking Biotin.      Ammonia [[id number]]  (Normal) Collected: 01/28/25 1109    Specimen: Blood Updated: 01/28/25 1136     Ammonia 16 umol/L     TSH Rfx On Abnormal To Free T4 [[id number]]  (Normal) Collected: 01/28/25 0347    Specimen: Blood Updated: 01/28/25 1114     TSH 1.520 uIU/mL     Hepatic Function Panel [[id number]]  (Abnormal) Collected: 01/28/25 0347    Specimen: Blood Updated: 01/28/25 1045     Total Protein 5.9 g/dL      Albumin 3.4 g/dL      ALT (SGPT) 20 U/L      AST (SGOT) 17 U/L      Alkaline Phosphatase 65 U/L      Total Bilirubin 0.8 mg/dL      Bilirubin, Direct <0.2 mg/dL      Bilirubin, Indirect --     Comment: Unable to calculate       Basic Metabolic Panel [[id number]]  (Abnormal) Collected: 01/28/25 0347    Specimen: Blood Updated: 01/28/25 0429     Glucose 109 mg/dL      BUN 12 mg/dL      Creatinine 0.81 mg/dL      Sodium 137 mmol/L      Potassium 3.4 mmol/L      Comment: Slight hemolysis detected by analyzer. Result may be falsely elevated.        Chloride 108 mmol/L      CO2 19.3 mmol/L      Calcium 8.0 mg/dL      BUN/Creatinine Ratio 14.8     Anion Gap 9.7 mmol/L      eGFR 102.8 mL/min/1.73     Narrative:      GFR Categories in Chronic Kidney Disease (CKD)      GFR Category          GFR (mL/min/1.73)    Interpretation  G1                     90 or greater         Normal or high (1)  G2                      60-89                 Mild decrease (1)  G3a                   45-59                Mild to moderate decrease  G3b                   30-44                Moderate to severe decrease  G4                    15-29                Severe decrease  G5                    14 or less           Kidney failure          (1)In the absence of evidence of kidney disease, neither GFR category G1 or G2 fulfill the criteria for CKD.    eGFR calculation 2021 CKD-EPI creatinine equation, which does not include race as a factor    CBC & Differential [[id number]]  (Abnormal) Collected: 01/28/25 0347    Specimen: Blood Updated: 01/28/25 0422    Narrative:      The following orders were created for panel order CBC & Differential.  Procedure                               Abnormality         Status                     ---------                               -----------         ------                     CBC Auto Differential[[id number]]        Abnormal            Final result                 Please view results for these tests on the individual orders.    CBC Auto Differential [[id number]]  (Abnormal) Collected: 01/28/25 0347    Specimen: Blood Updated: 01/28/25 0422     WBC 11.18 10*3/mm3      RBC 4.74 10*6/mm3      Hemoglobin 13.2 g/dL      Hematocrit 41.4 %      MCV 87.3 fL      MCH 27.8 pg      MCHC 31.9 g/dL      RDW 13.1 %      RDW-SD 42.0 fl      MPV 10.1 fL      Platelets 189 10*3/mm3      Neutrophil % 73.1 %      Lymphocyte % 12.1 %      Monocyte % 9.0 %      Eosinophil % 5.2 %      Basophil % 0.3 %      Immature Grans % 0.3 %      Neutrophils, Absolute 8.18 10*3/mm3      Lymphocytes, Absolute 1.35 10*3/mm3      Monocytes, Absolute 1.01 10*3/mm3      Eosinophils, Absolute 0.58 10*3/mm3      Basophils, Absolute 0.03 10*3/mm3      Immature Grans, Absolute 0.03 10*3/mm3      nRBC 0.0 /100 WBC     Hemoglobin & Hematocrit, Blood [[id number]]  (Normal) Collected: 01/27/25 1653    Specimen: Blood Updated: 01/27/25 2961     Hemoglobin 14.0 g/dL      Hematocrit 41.3  "%           Data Review:  Results from last 7 days   Lab Units 01/30/25  0516 01/29/25  0525 01/28/25  1511 01/28/25  0347   SODIUM mmol/L 140 136  --  137   POTASSIUM mmol/L 3.4* 3.9  3.9 3.6 3.4*   CHLORIDE mmol/L 107 108*  --  108*   CO2 mmol/L 21.5* 20.0*  --  19.3*   BUN mg/dL 8 7  --  12   CREATININE mg/dL 0.83 0.81  --  0.81   GLUCOSE mg/dL 104* 93  --  109*   CALCIUM mg/dL 8.2* 7.7*  --  8.0*     Results from last 7 days   Lab Units 01/30/25  0516 01/29/25  0525 01/28/25  0347   WBC 10*3/mm3 11.83* 9.54 11.18*   HEMOGLOBIN g/dL 13.1 13.3 13.2   HEMATOCRIT % 37.7 38.9 41.4   PLATELETS 10*3/mm3 185 178 189     Results from last 7 days   Lab Units 01/28/25  0347   TSH uIU/mL 1.520         Lab Results   Lab Value Date/Time    TROPONINT <6 01/26/2025 0609    TROPONINT <6 01/25/2025 1034    TROPONINT <6 01/25/2025 0928    TROPONINT <6 12/17/2024 1923    TROPONINT <6 12/17/2024 1728    TROPONINT 9 03/15/2024 0423    TROPONINT 10 10/26/2023 2014    TROPONINT 8 10/23/2023 1909    TROPONINT 8 08/19/2023 1628    TROPONINT 6 08/16/2023 1458    TROPONINT <0.010 12/08/2022 0653    TROPONINT <0.010 12/02/2022 1124     Results from last 7 days   Lab Units 01/26/25  0609   CHOLESTEROL mg/dL 163   TRIGLYCERIDES mg/dL 154*   HDL CHOL mg/dL 33*   LDL CHOL mg/dL 103*     Results from last 7 days   Lab Units 01/30/25  0516 01/29/25  0525 01/28/25  0347   ALK PHOS U/L 74 68 65   BILIRUBIN mg/dL 0.5 0.7 0.8   BILIRUBIN DIRECT mg/dL  --   --  <0.2   ALT (SGPT) U/L 14 17 20   AST (SGOT) U/L 16 17 17     Results from last 7 days   Lab Units 01/28/25  0347   TSH uIU/mL 1.520         No results found for: \"POCGLU\"        Past Medical History:   Diagnosis Date    ADHD (attention deficit hyperactivity disorder)     On going issue    Anxiety     Lepe's esophagus     Benign prostatic hyperplasia Surgery in 12/2021    Brain concussion 11/2023    Clotting disorder Intestinal    Depression     Diverticulitis     Diverticulosis     " Duodenitis     Enteritis     Failure to thrive (child)     Family history of blood clots     Fracture of wrist 1985    Fracture, foot 2019    GERD (gastroesophageal reflux disease)     Hyperlipidemia     Hypertension     Hypertensive emergency     Irritable bowel syndrome 2017    Low testosterone     Microcytosis     Pancreatitis     Pneumonia     PONV (postoperative nausea and vomiting)     Seasonal affective disorder     Shoulder injury     Stroke     Unexplained weight loss     Visual impairment 8/2023       Assessment:  Active Hospital Problems    Diagnosis  POA    **Syncope [R55]  Yes    Alcohol withdrawal [F10.939]  Yes    Hypertensive emergency [I16.1]  Yes    History of atrial fibrillation [Z86.79]  Not Applicable    Alcohol use [Z78.9]  Yes    Mood disorder [F39]  Yes    History of CVA (cerebrovascular accident) [Z86.73]  Not Applicable      Resolved Hospital Problems   No resolved problems to display.       Plan:  Hopefully will do better with the Seroquel.  Follow-up on labs.  Hopefully if remains stable can move out of critical care unit soon.    Blake Beckett MD  1/30/2025  16:21 EST

## 2025-01-30 NOTE — THERAPY EVALUATION
Acute Care - Speech Language Pathology Initial Evaluation  [facility]     Patient Name: Flako Coreas Sr.  : 1965  MRN: [id number]  Today's Date: 2025               Admit Date: 2025     Visit Dx:    ICD-10-CM ICD-9-CM   1. Syncope, unspecified syncope type  R55 780.2   2. Hypertensive urgency  I16.0 401.9   3. 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    Anxiety about health    Vertebral artery stenosis, left    Memory loss    History of alcohol dependence    Vasovagal syncope    Elevated lipoprotein(a)    Elevated coronary artery calcium score    PAF (paroxysmal atrial fibrillation)    Syncope    Hypertensive emergency    History of atrial fibrillation    Alcohol use    Mood disorder    Alcohol withdrawal     Past Medical History:   Diagnosis Date    ADHD (attention deficit hyperactivity disorder)     On going issue    Anxiety     Lepe's esophagus     Benign prostatic hyperplasia Surgery in 2021    Brain concussion 2023    Clotting disorder Intestinal    Depression     Diverticulitis     Diverticulosis     Duodenitis     Enteritis     Failure to thrive (child)     Family history of blood clots     Fracture of wrist 1985    Fracture, foot 2019    GERD (gastroesophageal reflux disease)     Hyperlipidemia     Hypertension     Hypertensive emergency     Irritable bowel syndrome 2017    Low testosterone     Microcytosis     Pancreatitis     Pneumonia     PONV (postoperative nausea and vomiting)     Seasonal affective disorder     Shoulder injury     Stroke     Unexplained  weight loss     Visual impairment 8/2023     Past Surgical History:   Procedure Laterality Date    CARDIAC ELECTROPHYSIOLOGY PROCEDURE N/A 01/24/2024    Procedure: Loop insertion- [employer] LINQ;  Surgeon: Kaela Walker MD;  Location: [facility] CATH INVASIVE LOCATION;  Service: Cardiovascular;  Laterality: N/A;    COLON SURGERY      COLONOSCOPY      COLONOSCOPY N/A 12/11/2022    Procedure: COLONOSCOPY INTO CECUM WITH HOT SNARE POLYPECTOMY;  Surgeon: Albert Gallo MD;  [facility];  Service: Gastroenterology;  Laterality: N/A;  PRE- GI BLEED  POST- DIVERTICULOSIS, POLYP    COLONOSCOPY N/A 02/14/2024    Procedure: COLONOSCOPY into cecum/terminal ileum;  Surgeon: Albert Gallo MD;  [facility];  Service: Gastroenterology;  Laterality: N/A;  diverticulosis, hemorrhoids    ENDOSCOPY N/A 12/10/2022    Procedure: ESOPHAGOGASTRODUODENOSCOPY;  Surgeon: Albert Gallo MD;  [facility];  Service: Gastroenterology;  Laterality: N/A;  PRE- DARK STOOLS  POST- BARRETTS ESOPHAGUS    ENDOSCOPY N/A 02/14/2024    Procedure: ESOPHAGOGASTRODUODENOSCOPY with biopsy;  Surgeon: Albert Gallo MD;  Location: [facility] ENDOSCOPY;  Service: Gastroenterology;  Laterality: N/A;  long segment wilson's, hiatal hernia    FRACTURE SURGERY Right 1980    for broken arm    FRACTURE SURGERY Right     for broken hand    HAND SURGERY  1990    INCISION AND DRAINAGE ABSCESS  2015    anal    PROSTATE SURGERY      SHOULDER SURGERY  2022    SMALL INTESTINE SURGERY      TONSILLECTOMY      TRIGGER POINT INJECTION  2017       SLP Recommendation and Plan  SLP Diagnosis: mild, cognitive-linguistic disorder (01/30/25 1544)  SLP Diagnosis Comments: Cognitive-linguistic evaluation completed per pt request. Pt reports worsening cognitive deficits since 2023 CVA. Portions of the SLUMS administered. Presents this date with suspected mild cognitive impairment characterized by decreased temporal orientation, short-term  memory, and mental flexibility. Prolonged processing speeds also noted. Tearful during assessment. Strengths noted in functional problem solving and thought organization. Discussed recommendation to follow with speech therapy at next level of care (HH vs OP) for ongoing cognitive treatment. Pt verbalized understanding and eager to participate in therapy. (01/30/25 1544)           [facility] [place] for Skilled Therapy Interventions Met: yes (01/30/25 1544)  Anticipated Discharge Disposition (SLP): home with home health (01/30/25 1544)        Therapy Frequency (SLP [facility] PRN (01/30/25 1544)  Predicted Duration Therapy Intervention (Days): until discharge (01/30/25 1544)                             Outcome Evaluation: Cognitive-linguistic evaluation completed per pt request. Pt reports worsening cognitive deficits since 2023 CVA. Presents this date with suspected mild cognitive impairment characterized by decreased temporal orientation, short-term memory, and mental flexibility. Prolonged processing speeds also noted. Tearful during assessment. Strengths noted in functional problem solving and thought organization. Discussed recommendation to follow with speech therapy at next level of care (HH vs OP) for ongoing cognitive treatment. Pt verbalized understanding and eager to participate in therapy. (01/30/25 1553)      SLP EVALUATION (Last 72 Hours)       SLP SLC Evaluation       Row Name 01/30/25 1544                   Communication Assessment/Intervention    Document Type evaluation  -CR        Subjective Information no complaints  -CR        Patient Observations alert;cooperative  -CR        Patient Effort adequate  -CR        Symptoms Noted During/After Treatment none  -CR           General Information    Patient Profile Reviewed yes  -CR        Pertinent History Of Current Problem 58 y/o male admitted with near syncope. Hospitalization complicated by ETOH withdrawal. Pt requested cognitive evaluation due to worsening  cognitive deficits from 2023 cerebellar stroke. Managing medications and finances without difficulties, per pt. Not driving. Pt followed with outpatient speech therapy for initial evaluation 12/4/23 and never returned for treatment due to insurance issues; mild cognitive-linguistic impairment revealed during evaluation.  -CR        Precautions/Limitations, Vision WFL;for purposes of eval  -CR        Precautions/Limitations, Hearing WFL;for purposes of eval  -CR        Prior Level of Function-Communication cognitive-linguistic impairment  -CR        Plans/Goals Discussed with patient;agreed upon  -CR        Barriers to Rehab none identified  -CR           Pain    Pretreatment Pain Rating 0/10 - no pain  -CR        Posttreatment Pain Rating 0/10 - no pain  -CR           SLP Evaluation Clinical Impressions    SLP Diagnosis mild;cognitive-linguistic disorder  -CR        SLP Diagnosis Comments Cognitive-linguistic evaluation completed per pt request. Pt reports worsening cognitive deficits since 2023 CVA. Portions of the SLUMS administered. Presents this date with suspected mild-moderate cognitive impairment characterized by decreased temporal orientation, short-term memory, and mental flexibility. Prolonged processing speeds also noted. Tearful during assessment. Strengths noted in functional problem solving and thought organization. Discussed recommendation to follow with speech therapy at next level of care (HH vs OP) for ongoing cognitive treatment. Pt verbalized understanding and eager to participate in therapy.  -CR        Rehab Potential/Prognosis good  -CR        SLC Criteria for Skilled Therapy Interventions Met yes  -CR        Functional Impact difficulty completing home management task;difficulty completing vocational tasks  -CR           Recommendations    Therapy Frequency (SLP SLC) PRN  -CR        Predicted Duration Therapy Intervention (Days) until discharge  -CR        Anticipated Discharge Disposition  (SLP) home with home health  -CR           Communication Treatment Objective and Progress Goals (SLP)    Cognitive Linguistic Treatment Objectives Cognitive Linguistic Treatment Objectives (Group)  -CR           Cognitive Linguistic Treatment Objectives    Memory Skills Selection memory skills, SLP goal 1  -CR        Executive Function Skills Selection executive function skills, SLP goal 1  -CR           Memory Skills Goal 1 (SLP)    Improve Memory Skills Through Goal 1 (SLP) recalling related word lists with an imposed delay;read a paragraph and answer questions;80%;independently (over 90% accuracy)  -CR        Time Frame (Memory Skills Goal 1, SLP) by discharge  -CR        Progress/Outcomes (Memory Skills Goal 1, SLP) new goal  -CR           Executive Functional Skills Goal 1 (SLP)    Improve Executive Function Skills Goal 1 (SLP) time management activity;home management activity;80%;independently (over 90% accuracy)  -CR        Time Frame (Executive Function Skills Goal 1, SLP) by discharge  -CR        Progress/Outcomes (Executive Function Skills Goal 1, SLP) new goal  -CR                  User Key  (r) = Recorded By, (t) = Taken By, (c) = Cosigned By      Initials Name Effective Dates    CR Isabel Brown, ANA LAURA 12/03/24 -                        EDUCATION  The patient has been educated in the following areas:     Cognitive Impairment.           SLP GOALS       Row Name 01/30/25 1544             Memory Skills Goal 1 (SLP)    Improve Memory Skills Through Goal 1 (SLP) recalling related word lists with an imposed delay;read a paragraph and answer questions;80%;independently (over 90% accuracy)  -CR      Time Frame (Memory Skills Goal 1, SLP) by discharge  -CR      Progress/Outcomes (Memory Skills Goal 1, SLP) new goal  -CR         Executive Functional Skills Goal 1 (SLP)    Improve Executive Function Skills Goal 1 (SLP) time management activity;home management activity;80%;independently (over 90% accuracy)  -CR       Time Frame (Executive Function Skills Goal 1, SLP) by discharge  -CR      Progress/Outcomes (Executive Function Skills Goal 1, SLP) new goal  -CR                User Key  (r) = Recorded By, (t) = Taken By, (c) = Cosigned By      Initials Name Provider Type    Isabel Sampson SLP Speech and Language Pathologist                              Time Calculation:      Time Calculation- SLP       Row Name 01/30/25 1555             Time Calculation- SLP    SLP Start Time 1400  -CR      SLP Received On 01/30/25  -CR         Untimed Charges    40656-CW Eval Speech and Production w/ Language Minutes 45  -CR         Total Minutes    Untimed Charges Total Minutes 45  -CR       Total Minutes 45  -CR                User Key  (r) = Recorded By, (t) = Taken By, (c) = Cosigned By      Initials Name Provider Type    Isabel Sampson SLP Speech and Language Pathologist                    Therapy Charges for Today       Code Description Service Date Service Provider Modifiers Qty    82971256141 HC ST EVAL SPEECH AND PROD W LANG  3 1/30/2025 Isabel Brown SLP GN 1                       ANA LAURA Burton  1/30/2025

## 2025-01-30 NOTE — NURSING NOTE
Access follow up regarding ETOH: last CIWA score of 0. Progressing towards goal and improving. He has been apologetic with staff about agitation noted earlier. Left [facility] information sheet for patient to review as he previously discussed wanting to get treatment. No acute changes reported. Pt has remained out of restraints. Following for support.

## 2025-01-31 LAB
ALBUMIN SERPL-MCNC: 3.4 G/DL (ref 3.5–5.2)
ALBUMIN/GLOB SERPL: 1.1 G/DL
ALP SERPL-CCNC: 86 U/L (ref 39–117)
ALT SERPL W P-5'-P-CCNC: 16 U/L (ref 1–41)
ANION GAP SERPL CALCULATED.3IONS-SCNC: 13 MMOL/L (ref 5–15)
AST SERPL-CCNC: 15 U/L (ref 1–40)
BASOPHILS # BLD AUTO: 0.03 10*3/MM3 (ref 0–0.2)
BASOPHILS NFR BLD AUTO: 0.3 % (ref 0–1.5)
BILIRUB SERPL-MCNC: 0.4 MG/DL (ref 0–1.2)
BUN SERPL-MCNC: 7 MG/DL (ref 6–20)
BUN/CREAT SERPL: 8.4 (ref 7–25)
CALCIUM SPEC-SCNC: 8.7 MG/DL (ref 8.6–10.5)
CHLORIDE SERPL-SCNC: 108 MMOL/L (ref 98–107)
CO2 SERPL-SCNC: 21 MMOL/L (ref 22–29)
CREAT SERPL-MCNC: 0.83 MG/DL (ref 0.76–1.27)
DEPRECATED RDW RBC AUTO: 40.7 FL (ref 37–54)
EGFRCR SERPLBLD CKD-EPI 2021: 102.1 ML/MIN/1.73
EOSINOPHIL # BLD AUTO: 0.43 10*3/MM3 (ref 0–0.4)
EOSINOPHIL NFR BLD AUTO: 4.3 % (ref 0.3–6.2)
ERYTHROCYTE [DISTWIDTH] IN BLOOD BY AUTOMATED COUNT: 13.2 % (ref 12.3–15.4)
GLOBULIN UR ELPH-MCNC: 3.2 GM/DL
GLUCOSE SERPL-MCNC: 136 MG/DL (ref 65–99)
HCT VFR BLD AUTO: 42.4 % (ref 37.5–51)
HGB BLD-MCNC: 14.2 G/DL (ref 13–17.7)
IMM GRANULOCYTES # BLD AUTO: 0.03 10*3/MM3 (ref 0–0.05)
IMM GRANULOCYTES NFR BLD AUTO: 0.3 % (ref 0–0.5)
LYMPHOCYTES # BLD AUTO: 1.72 10*3/MM3 (ref 0.7–3.1)
LYMPHOCYTES NFR BLD AUTO: 17.2 % (ref 19.6–45.3)
MCH RBC QN AUTO: 28.1 PG (ref 26.6–33)
MCHC RBC AUTO-ENTMCNC: 33.5 G/DL (ref 31.5–35.7)
MCV RBC AUTO: 84 FL (ref 79–97)
MONOCYTES # BLD AUTO: 1.06 10*3/MM3 (ref 0.1–0.9)
MONOCYTES NFR BLD AUTO: 10.6 % (ref 5–12)
NEUTROPHILS NFR BLD AUTO: 6.73 10*3/MM3 (ref 1.7–7)
NEUTROPHILS NFR BLD AUTO: 67.3 % (ref 42.7–76)
NRBC BLD AUTO-RTO: 0 /100 WBC (ref 0–0.2)
PLATELET # BLD AUTO: 269 10*3/MM3 (ref 140–450)
PMV BLD AUTO: 10.2 FL (ref 6–12)
POTASSIUM SERPL-SCNC: 3.4 MMOL/L (ref 3.5–5.2)
POTASSIUM SERPL-SCNC: 3.4 MMOL/L (ref 3.5–5.2)
POTASSIUM SERPL-SCNC: 4.3 MMOL/L (ref 3.5–5.2)
PROT SERPL-MCNC: 6.6 G/DL (ref 6–8.5)
RBC # BLD AUTO: 5.05 10*6/MM3 (ref 4.14–5.8)
SODIUM SERPL-SCNC: 142 MMOL/L (ref 136–145)
WBC NRBC COR # BLD AUTO: 10 10*3/MM3 (ref 3.4–10.8)

## 2025-01-31 PROCEDURE — 94799 UNLISTED PULMONARY SVC/PX: CPT

## 2025-01-31 PROCEDURE — 85025 COMPLETE CBC W/AUTO DIFF WBC: CPT | Performed by: INTERNAL MEDICINE

## 2025-01-31 PROCEDURE — 25010000002 CEFTRIAXONE PER 250 MG: Performed by: INTERNAL MEDICINE

## 2025-01-31 PROCEDURE — 84132 ASSAY OF SERUM POTASSIUM: CPT

## 2025-01-31 PROCEDURE — 80053 COMPREHEN METABOLIC PANEL: CPT | Performed by: STUDENT IN AN ORGANIZED HEALTH CARE EDUCATION/TRAINING PROGRAM

## 2025-01-31 PROCEDURE — 93005 ELECTROCARDIOGRAM TRACING: CPT | Performed by: HOSPITALIST

## 2025-01-31 PROCEDURE — 84132 ASSAY OF SERUM POTASSIUM: CPT | Performed by: STUDENT IN AN ORGANIZED HEALTH CARE EDUCATION/TRAINING PROGRAM

## 2025-01-31 RX ORDER — GUAIFENESIN 600 MG/1
600 TABLET, EXTENDED RELEASE ORAL EVERY 12 HOURS SCHEDULED
Status: DISCONTINUED | OUTPATIENT
Start: 2025-01-31 | End: 2025-02-02 | Stop reason: HOSPADM

## 2025-01-31 RX ORDER — POTASSIUM CHLORIDE 1.5 G/1.58G
40 POWDER, FOR SOLUTION ORAL EVERY 4 HOURS
Status: COMPLETED | OUTPATIENT
Start: 2025-01-31 | End: 2025-01-31

## 2025-01-31 RX ORDER — QUETIAPINE FUMARATE 25 MG/1
50 TABLET, FILM COATED ORAL EVERY 12 HOURS SCHEDULED
Status: DISCONTINUED | OUTPATIENT
Start: 2025-01-31 | End: 2025-02-01

## 2025-01-31 RX ADMIN — CEFTRIAXONE SODIUM 1000 MG: 1 INJECTION, POWDER, FOR SOLUTION INTRAMUSCULAR; INTRAVENOUS at 06:50

## 2025-01-31 RX ADMIN — AMLODIPINE BESYLATE 10 MG: 10 TABLET ORAL at 12:13

## 2025-01-31 RX ADMIN — POTASSIUM CHLORIDE 40 MEQ: 1.5 POWDER, FOR SOLUTION ORAL at 10:38

## 2025-01-31 RX ADMIN — SENNOSIDES AND DOCUSATE SODIUM 2 TABLET: 50; 8.6 TABLET ORAL at 21:17

## 2025-01-31 RX ADMIN — FOLIC ACID 1 MG: 1 TABLET ORAL at 08:04

## 2025-01-31 RX ADMIN — QUETIAPINE FUMARATE 50 MG: 25 TABLET, FILM COATED ORAL at 21:17

## 2025-01-31 RX ADMIN — DIAZEPAM 10 MG: 5 TABLET ORAL at 08:04

## 2025-01-31 RX ADMIN — ASPIRIN 81 MG CHEWABLE TABLET 81 MG: 81 TABLET CHEWABLE at 08:04

## 2025-01-31 RX ADMIN — ESCITALOPRAM 10 MG: 10 TABLET, FILM COATED ORAL at 21:16

## 2025-01-31 RX ADMIN — GUAIFENESIN 600 MG: 600 TABLET, MULTILAYER, EXTENDED RELEASE ORAL at 21:16

## 2025-01-31 RX ADMIN — LISINOPRIL 40 MG: 20 TABLET ORAL at 12:13

## 2025-01-31 RX ADMIN — Medication 1 TABLET: at 08:04

## 2025-01-31 RX ADMIN — MUPIROCIN 1 APPLICATION: 20 OINTMENT TOPICAL at 08:04

## 2025-01-31 RX ADMIN — PANTOPRAZOLE SODIUM 40 MG: 40 TABLET, DELAYED RELEASE ORAL at 06:50

## 2025-01-31 RX ADMIN — CLONIDINE HYDROCHLORIDE 0.1 MG: 0.1 TABLET ORAL at 08:03

## 2025-01-31 RX ADMIN — ATORVASTATIN CALCIUM 40 MG: 20 TABLET, FILM COATED ORAL at 21:16

## 2025-01-31 RX ADMIN — POTASSIUM CHLORIDE 40 MEQ: 1.5 POWDER, FOR SOLUTION ORAL at 06:50

## 2025-01-31 RX ADMIN — QUETIAPINE FUMARATE 25 MG: 25 TABLET, FILM COATED ORAL at 08:04

## 2025-01-31 RX ADMIN — Medication 100 MG: at 08:04

## 2025-01-31 RX ADMIN — HYDROCORTISONE ACETATE 25 MG: 25 SUPPOSITORY RECTAL at 21:17

## 2025-01-31 NOTE — PROGRESS NOTES
The patient been pleasant with staff but continues to exhibit some significant confusion stating that he recognizes this physician from television.  I have increased his Seroquel to 50 mg twice daily, continuing as needed Haldol.

## 2025-01-31 NOTE — NURSING NOTE
Pt remains in CICU on room air. A/Ox4. Pt could not remember he ate breakfast this morning. Possibly needs to re-consult Neurology. Precedex D/C. Pt tolerating being off the Precedex & wishes to not be put back on. Pt complaining of frequent urinating- UA ordered-See results- Ceftriaxone ordered for 5 days. PRN antihypertensives given. Pt able to get up assist x 1 to bathroom. X 3 BM today. Please DO NOT bring up pt being an alcoholic- pt becomes agitated & upset. Pt updated on plan of care.

## 2025-01-31 NOTE — NURSING NOTE
"Access Center follow up for Etoh use and mood disturbance. Reviewed chart and spoke with THEA Orona. Pt was noted sitting up in bed with his friend \"SOPHIE Mckinney\" present. Pt gave permission for his presence in the room.    Pt began conversation stating he was \"not good\" and described several reasons why. Pt voiced continued frustration with the way he was dealt with by staff/security on admission. Pt feels he was being viewed as an \"agitated drunk who was handled by multiple security guards\" who were not listening to him that he \"could not breath\". States his breathing was worse due to the management and says that's why his son was pulling them off of him. Pt stated he felt the security team was \"taking lary out of the situation\". While pt does admit that there has been a memory gap for a couple days prior to admission, he reiterated multiple times that his behavior was not due to drinking. He reports drinking on occasion but denies it being a daily thing for him. Pt friend SOPHIE, agrees that he and pts son view pt as having minimal issues with alcohol.   Pt was very frustrated during conversation stating that nurse and doctor documentation of behavioral events will reflect very poorly on his character, which could affect his care down the road. \"If I ever need a nursing home, they're going to think I'm a violent person.\" Pt stated that his MD here at [facility] assessed him as if his behavioral event was solely due to alcohol.     While pt is uncertain of cause of breathing issues and behavior, he does acknowledge that he is having poor sleep and has started a CPAP machine here in hospital, but can only tolerate two hours so far. Pt states he had been approved for one but had insurance issues. Pt reports \"fainting every 6 weeks or so\" and thinks this could be related. Pt rates his current anxiety as 8 of 10 (10 being worst) and depression a 10. Pt denies SI and says he has his son to live for. Pt also says he has \"good " "things going\", he will be volunteering at [employer] as soon as he is healthy enough. Pt appetite poor, \"I've only eaten a little bit of eggs and this milk since Friday.\"     Pt focus moving forward is to discharge \"asap\". Pt states the nurses have been great, but he is focused on getting to the bottom of what happened. \"I want to know who decided that this has all been due to alcohol withdrawal, what about my breathing?\" Pt somewhat fearful about being drug into another security event if stays any longer.     Pt re-educated about CCP role and taking steps to acquire a CPAP machine. Re-educated pt on current psychiatric medications and having another look at outpatient mental health resources for therapy, psychiatry, etc. Pt was not aware of what happened to handouts previously given. [facility] will continue to follow and provide again with neccessary resources.   "

## 2025-01-31 NOTE — PROGRESS NOTES
[facility]  [phone]  Dr. Pool Nevarez     Subjective:  LOS: 4    Chief Complaint:  Increasing agitation     Patient seems to be doing better today.  With less agitation.  Although does become agitated when mentioning alcohol withdrawal or any reference to him drinking significant amounts of alcohol.  Is adamant that he does not drink that much.  Also wondering why he is having repeated syncopal episodes that have been going on for months.    Objective   Vital Signs past 24hrs  Temp range: Temp (24hrs), Av.1 °F (37.3 °C), Min:97.9 °F (36.6 °C), Max:100.6 °F (38.1 °C)    BP range: BP: ()/() 129/69  Pulse range: Heart Rate:  [] 113  Resp rate range: Resp:  [16-18] 16  Device (Oxygen Therapy): room air   Oxygen range:SpO2:  [92 %-100 %] 94 %   Mechanical Ventilator:     Physical Exam  Vitals and nursing note reviewed.   Constitutional:       General: He is awake. He is not in acute distress.  HENT:      Head: Normocephalic and atraumatic.   Cardiovascular:      Rate and Rhythm: Normal rate and regular rhythm.      Heart sounds: No murmur heard.  Pulmonary:      Effort: Pulmonary effort is normal. No respiratory distress.      Breath sounds: Normal breath sounds. No wheezing, rhonchi or rales.   Abdominal:      General: Abdomen is flat.      Tenderness: There is no abdominal tenderness.   Skin:     General: Skin is warm and dry.      Findings: No rash.   Neurological:      Mental Status: He is alert.       Results Review:    I have reviewed the laboratory and imaging data since the last note by [facility] physician.  My annotations are noted in assessment and plan.      Result Review:  I have personally reviewed the results from last note by [facility] physician to 2025 21:08 EST and agree with these findings:  [x]  Laboratory list / accordion  [x]  Microbiology  [x]  Radiology  [x]  EKG/Telemetry   [x]  Cardiology/Vascular   [x]  Pathology  [x]  Old records  []  Other:    Medication  Review:  I have reviewed the current MAR.  My annotations are noted in assessment and plan.    amLODIPine, 10 mg, Oral, Daily  aspirin, 81 mg, Oral, Daily  atorvastatin, 40 mg, Oral, Nightly  cefTRIAXone, 1,000 mg, Intravenous, Q12H  escitalopram, 10 mg, Oral, Nightly  folic acid, 1 mg, Oral, Daily  hydrocortisone, 25 mg, Rectal, BID  lisinopril, 40 mg, Oral, Daily  [Held by provider] metoprolol tartrate, 25 mg, Oral, Q12H  multivitamin with minerals, 1 tablet, Oral, Daily  mupirocin, 1 Application, Each Nare, BID  pantoprazole, 40 mg, Oral, QAM AC  potassium chloride, 40 mEq, Oral, Q4H  QUEtiapine, 25 mg, Oral, Q12H  senna-docusate sodium, 2 tablet, Oral, BID  [START ON 1/31/2025] thiamine, 100 mg, Oral, Daily           Lines, Drains & Airways       Active LDAs       Name Placement date Placement time Site Days    Peripheral IV 01/28/25 0124 Posterior;Right Hand 01/28/25  0124  Hand  2    Peripheral IV 01/28/25 1052 Anterior;Right Antecubital 01/28/25  1052  Antecubital  2    Peripheral IV 01/28/25 1052 Left;Posterior Hand 01/28/25  1052  Hand  below thumb  2                  No active isolations  Diet Orders (active) (From admission, onward)       Start     Ordered    01/25/25 1624  Diet: Cardiac; Healthy Heart (2-3 Na+); Fluid Consistency: Thin (IDDSI 0)  Diet Effective Now         01/25/25 1623                      Assessment  Acute toxic/metabolic Encephalopathy  Suspected Alcohol withdrawal with worsening agitation and combativeness  Syncope  Hypertensive emergency  Mood disorder  History of atrial fibrillation  Gastrosoft reflux disease with esophagitis and Lepe's esophagus  Diverticulosis     Plan  -Patient presenting with syncope and prior history of heavy alcohol use.  Was found to have an alcohol level of 0.  Has been managed his alcohol withdrawal on the floor.  Brought to ICU due to significant agitation and aggressive behavior  - Behavioral health consulted  - Continue Precedex and withdrawal  taper  - Monitor blood pressure  -CTA head/neck and MRI brain both negative for any acute intracranial pathology  -Access and psychiatry consulted and following  - Neurology consulted and appreciate input.  Given the patient was confirmed to be heavily drinking a few days per week with negative scans likely all due to alcohol withdrawal.  Signed off.  - Cardiology consulted regarding the syncope.  Loop recorder negative for any explanation to syncopal events.  Anticoagulation being held due to frequent falls.  Signed off  -  off precedex now. Plan to transfer to floor tomorrow.       Pool Nevarez DO   01/30/25  21:08 EST      Part of this note may be an electronic transcription/translation of spoken language to printed text using the Dragon Dictation System.

## 2025-01-31 NOTE — PLAN OF CARE
Goal Outcome Evaluation:  Plan of Care Reviewed With: patient        Progress: improving     Patient arrived to floor after lunch.  VSS, B/P stable.  He is calm and cooperative, but a little anxious.  Placed on monitor and oriented to floor.  SR to ST on the monitor.  Will continue to monitor patient.

## 2025-01-31 NOTE — PLAN OF CARE
Goal Outcome Evaluation:      Pt remains in the CICU. On room air. Agitated and paranoid at the beginning of the shift; required one dose of PRN Valium. Pt still having frequent urinating and bowel movements overnight. Remains off precedex.

## 2025-01-31 NOTE — PROGRESS NOTES
"DAILY PROGRESS NOTE  Psychiatric    Patient Identification:  Name: Flako Coreas Sr.  Age: 59 y.o.  Sex: male  :  1965  MRN: [id number]         Primary Care Physician: Akash Rodriguez Jr., DO    Subjective:  Interval History: He is out of ICU and doing better.  He has been walking in the [place] with a walker and seems less confused today.    Objective:    Scheduled Meds:amLODIPine, 10 mg, Oral, Daily  aspirin, 81 mg, Oral, Daily  atorvastatin, 40 mg, Oral, Nightly  [START ON 2025] cefTRIAXone, 1,000 mg, Intravenous, Q24H  escitalopram, 10 mg, Oral, Nightly  folic acid, 1 mg, Oral, Daily  hydrocortisone, 25 mg, Rectal, BID  lisinopril, 40 mg, Oral, Daily  [Held by provider] metoprolol tartrate, 25 mg, Oral, Q12H  multivitamin with minerals, 1 tablet, Oral, Daily  mupirocin, 1 Application, Each Nare, BID  pantoprazole, 40 mg, Oral, QAM AC  QUEtiapine, 50 mg, Oral, Q12H  senna-docusate sodium, 2 tablet, Oral, BID  thiamine, 100 mg, Oral, Daily      Continuous Infusions:     Vital signs in last 24 hours:  Temp:  [98.8 °F (37.1 °C)-100.6 °F (38.1 °C)] 98.8 °F (37.1 °C)  Heart Rate:  [] 90  Resp:  [16] 16  BP: (129-173)/() 146/85    Intake/Output:    Intake/Output Summary (Last 24 hours) at 2025 1606  Last data filed at 2025 1200  Gross per 24 hour   Intake 454 ml   Output 1980 ml   Net -1526 ml       Exam:  /85   Pulse 90   Temp 98.8 °F (37.1 °C) (Oral)   Resp 16   Ht 188 cm (74\")   Wt 103 kg (227 lb 4.7 oz)   SpO2 96%   BMI 29.18 kg/m²     General Appearance:    Alert, cooperative, no distress   Head:    Normocephalic, without obvious abnormality, atraumatic   Eyes:       Throat:   Lips, tongue, gums normal   Neck:   Supple, symmetrical, trachea midline, no JVD   Lungs:     Clear to auscultation bilaterally, respirations unlabored   Chest Wall:    No tenderness or deformity    Heart:    Regular rate and rhythm, S1 and S2 normal, no murmur,no  rub or " gallop   Abdomen:     Soft, nontender, bowel sounds active, no masses, no organomegaly    Extremities:   Extremities normal, atraumatic, no cyanosis or edema   Pulses:      Skin:   Skin is warm and dry,  no rashes or palpable lesions   Neurologic:   no focal deficits noted      Lab Results (last 72 hours)       Procedure Component Value Units Date/Time    Comprehensive Metabolic Panel [[id number]]  (Abnormal) Collected: 01/30/25 0516    Specimen: Blood Updated: 01/30/25 0558     Glucose 104 mg/dL      BUN 8 mg/dL      Creatinine 0.83 mg/dL      Sodium 140 mmol/L      Potassium 3.4 mmol/L      Comment: Slight hemolysis detected by analyzer. Result may be falsely elevated.        Chloride 107 mmol/L      CO2 21.5 mmol/L      Calcium 8.2 mg/dL      Total Protein 6.1 g/dL      Albumin 3.1 g/dL      ALT (SGPT) 14 U/L      AST (SGOT) 16 U/L      Comment: Slight hemolysis detected by analyzer. Result may be falsely elevated.        Alkaline Phosphatase 74 U/L      Total Bilirubin 0.5 mg/dL      Globulin 3.0 gm/dL      A/G Ratio 1.0 g/dL      BUN/Creatinine Ratio 9.6     Anion Gap 11.5 mmol/L      eGFR 102.1 mL/min/1.73     Narrative:      GFR Categories in Chronic Kidney Disease (CKD)      GFR Category          GFR (mL/min/1.73)    Interpretation  G1                     90 or greater         Normal or high (1)  G2                      60-89                Mild decrease (1)  G3a                   45-59                Mild to moderate decrease  G3b                   30-44                Moderate to severe decrease  G4                    15-29                Severe decrease  G5                    14 or less           Kidney failure          (1)In the absence of evidence of kidney disease, neither GFR category G1 or G2 fulfill the criteria for CKD.    eGFR calculation 2021 CKD-EPI creatinine equation, which does not include race as a factor    CBC & Differential [[id number]]  (Abnormal) Collected: 01/30/25 0516    Specimen:  Blood Updated: 01/30/25 0532    Narrative:      The following orders were created for panel order CBC & Differential.  Procedure                               Abnormality         Status                     ---------                               -----------         ------                     CBC Auto Differential[[id number]]        Abnormal            Final result                 Please view results for these tests on the individual orders.    CBC Auto Differential [[id number]]  (Abnormal) Collected: 01/30/25 0516    Specimen: Blood Updated: 01/30/25 0532     WBC 11.83 10*3/mm3      RBC 4.55 10*6/mm3      Hemoglobin 13.1 g/dL      Hematocrit 37.7 %      MCV 82.9 fL      MCH 28.8 pg      MCHC 34.7 g/dL      RDW 13.0 %      RDW-SD 38.8 fl      MPV 10.3 fL      Platelets 185 10*3/mm3      Neutrophil % 77.1 %      Lymphocyte % 11.7 %      Monocyte % 8.5 %      Eosinophil % 2.1 %      Basophil % 0.2 %      Immature Grans % 0.4 %      Neutrophils, Absolute 9.13 10*3/mm3      Lymphocytes, Absolute 1.38 10*3/mm3      Monocytes, Absolute 1.00 10*3/mm3      Eosinophils, Absolute 0.25 10*3/mm3      Basophils, Absolute 0.02 10*3/mm3      Immature Grans, Absolute 0.05 10*3/mm3      nRBC 0.0 /100 WBC     Comprehensive Metabolic Panel [[id number]]  (Abnormal) Collected: 01/29/25 0525    Specimen: Blood Updated: 01/29/25 0640     Glucose 93 mg/dL      BUN 7 mg/dL      Creatinine 0.81 mg/dL      Sodium 136 mmol/L      Potassium 3.9 mmol/L      Comment: Slight hemolysis detected by analyzer. Result may be falsely elevated.        Chloride 108 mmol/L      CO2 20.0 mmol/L      Calcium 7.7 mg/dL      Total Protein 5.5 g/dL      Albumin 3.1 g/dL      ALT (SGPT) 17 U/L      AST (SGOT) 17 U/L      Comment: Slight hemolysis detected by analyzer. Result may be falsely elevated.        Alkaline Phosphatase 68 U/L      Total Bilirubin 0.7 mg/dL      Globulin 2.4 gm/dL      A/G Ratio 1.3 g/dL      BUN/Creatinine Ratio 8.6     Anion Gap 8.0 mmol/L       eGFR 102.8 mL/min/1.73     Narrative:      GFR Categories in Chronic Kidney Disease (CKD)      GFR Category          GFR (mL/min/1.73)    Interpretation  G1                     90 or greater         Normal or high (1)  G2                      60-89                Mild decrease (1)  G3a                   45-59                Mild to moderate decrease  G3b                   30-44                Moderate to severe decrease  G4                    15-29                Severe decrease  G5                    14 or less           Kidney failure          (1)In the absence of evidence of kidney disease, neither GFR category G1 or G2 fulfill the criteria for CKD.    eGFR calculation 2021 CKD-EPI creatinine equation, which does not include race as a factor    Potassium [[id number]]  (Normal) Collected: 01/29/25 0525    Specimen: Blood Updated: 01/29/25 0639     Potassium 3.9 mmol/L      Comment: Slight hemolysis detected by analyzer. Result may be falsely elevated.       CBC & Differential [[id number]]  (Abnormal) Collected: 01/29/25 0525    Specimen: Blood Updated: 01/29/25 0559    Narrative:      The following orders were created for panel order CBC & Differential.  Procedure                               Abnormality         Status                     ---------                               -----------         ------                     CBC Auto Differential[[id number]]        Abnormal            Final result                 Please view results for these tests on the individual orders.    CBC Auto Differential [[id number]]  (Abnormal) Collected: 01/29/25 0525    Specimen: Blood Updated: 01/29/25 0559     WBC 9.54 10*3/mm3      RBC 4.65 10*6/mm3      Hemoglobin 13.3 g/dL      Hematocrit 38.9 %      MCV 83.7 fL      MCH 28.6 pg      MCHC 34.2 g/dL      RDW 13.3 %      RDW-SD 40.5 fl      MPV 10.3 fL      Platelets 178 10*3/mm3      Neutrophil % 72.6 %      Lymphocyte % 13.3 %      Monocyte % 10.6 %      Eosinophil % 3.0 %       Basophil % 0.2 %      Immature Grans % 0.3 %      Neutrophils, Absolute 6.92 10*3/mm3      Lymphocytes, Absolute 1.27 10*3/mm3      Monocytes, Absolute 1.01 10*3/mm3      Eosinophils, Absolute 0.29 10*3/mm3      Basophils, Absolute 0.02 10*3/mm3      Immature Grans, Absolute 0.03 10*3/mm3      nRBC 0.0 /100 WBC     Potassium [[id number]]  (Normal) Collected: 01/28/25 1511    Specimen: Blood Updated: 01/28/25 1536     Potassium 3.6 mmol/L     Vitamin B12 [[id number]]  (Normal) Collected: 01/28/25 1109    Specimen: Blood Updated: 01/28/25 1159     Vitamin B-12 568 pg/mL     Narrative:      Results may be falsely increased if patient taking Biotin.      Ammonia [[id number]]  (Normal) Collected: 01/28/25 1109    Specimen: Blood Updated: 01/28/25 1136     Ammonia 16 umol/L     TSH Rfx On Abnormal To Free T4 [[id number]]  (Normal) Collected: 01/28/25 0347    Specimen: Blood Updated: 01/28/25 1114     TSH 1.520 uIU/mL     Hepatic Function Panel [[id number]]  (Abnormal) Collected: 01/28/25 0347    Specimen: Blood Updated: 01/28/25 1045     Total Protein 5.9 g/dL      Albumin 3.4 g/dL      ALT (SGPT) 20 U/L      AST (SGOT) 17 U/L      Alkaline Phosphatase 65 U/L      Total Bilirubin 0.8 mg/dL      Bilirubin, Direct <0.2 mg/dL      Bilirubin, Indirect --     Comment: Unable to calculate       Basic Metabolic Panel [[id number]]  (Abnormal) Collected: 01/28/25 0347    Specimen: Blood Updated: 01/28/25 0429     Glucose 109 mg/dL      BUN 12 mg/dL      Creatinine 0.81 mg/dL      Sodium 137 mmol/L      Potassium 3.4 mmol/L      Comment: Slight hemolysis detected by analyzer. Result may be falsely elevated.        Chloride 108 mmol/L      CO2 19.3 mmol/L      Calcium 8.0 mg/dL      BUN/Creatinine Ratio 14.8     Anion Gap 9.7 mmol/L      eGFR 102.8 mL/min/1.73     Narrative:      GFR Categories in Chronic Kidney Disease (CKD)      GFR Category          GFR (mL/min/1.73)    Interpretation  G1                     90 or greater          Normal or high (1)  G2                      60-89                Mild decrease (1)  G3a                   45-59                Mild to moderate decrease  G3b                   30-44                Moderate to severe decrease  G4                    15-29                Severe decrease  G5                    14 or less           Kidney failure          (1)In the absence of evidence of kidney disease, neither GFR category G1 or G2 fulfill the criteria for CKD.    eGFR calculation 2021 CKD-EPI creatinine equation, which does not include race as a factor    CBC & Differential [[id number]]  (Abnormal) Collected: 01/28/25 0347    Specimen: Blood Updated: 01/28/25 0422    Narrative:      The following orders were created for panel order CBC & Differential.  Procedure                               Abnormality         Status                     ---------                               -----------         ------                     CBC Auto Differential[[id number]]        Abnormal            Final result                 Please view results for these tests on the individual orders.    CBC Auto Differential [[id number]]  (Abnormal) Collected: 01/28/25 0347    Specimen: Blood Updated: 01/28/25 0422     WBC 11.18 10*3/mm3      RBC 4.74 10*6/mm3      Hemoglobin 13.2 g/dL      Hematocrit 41.4 %      MCV 87.3 fL      MCH 27.8 pg      MCHC 31.9 g/dL      RDW 13.1 %      RDW-SD 42.0 fl      MPV 10.1 fL      Platelets 189 10*3/mm3      Neutrophil % 73.1 %      Lymphocyte % 12.1 %      Monocyte % 9.0 %      Eosinophil % 5.2 %      Basophil % 0.3 %      Immature Grans % 0.3 %      Neutrophils, Absolute 8.18 10*3/mm3      Lymphocytes, Absolute 1.35 10*3/mm3      Monocytes, Absolute 1.01 10*3/mm3      Eosinophils, Absolute 0.58 10*3/mm3      Basophils, Absolute 0.03 10*3/mm3      Immature Grans, Absolute 0.03 10*3/mm3      nRBC 0.0 /100 WBC     Hemoglobin & Hematocrit, Blood [[id number]]  (Normal) Collected: 01/27/25 1653    Specimen: Blood  "Updated: 01/27/25 1735     Hemoglobin 14.0 g/dL      Hematocrit 41.3 %           Data Review:  Results from last 7 days   Lab Units 01/31/25  1438 01/31/25  0452 01/30/25  1714 01/30/25  0516 01/29/25  0525   SODIUM mmol/L  --  142  --  140 136   POTASSIUM mmol/L 4.3 3.4*  3.4* 3.3* 3.4* 3.9  3.9   CHLORIDE mmol/L  --  108*  --  107 108*   CO2 mmol/L  --  21.0*  --  21.5* 20.0*   BUN mg/dL  --  7  --  8 7   CREATININE mg/dL  --  0.83  --  0.83 0.81   GLUCOSE mg/dL  --  136*  --  104* 93   CALCIUM mg/dL  --  8.7  --  8.2* 7.7*     Results from last 7 days   Lab Units 01/31/25  1438 01/30/25  0516 01/29/25  0525   WBC 10*3/mm3 10.00 11.83* 9.54   HEMOGLOBIN g/dL 14.2 13.1 13.3   HEMATOCRIT % 42.4 37.7 38.9   PLATELETS 10*3/mm3 269 185 178     Results from last 7 days   Lab Units 01/28/25  0347   TSH uIU/mL 1.520         Lab Results   Lab Value Date/Time    TROPONINT <6 01/26/2025 0609    TROPONINT <6 01/25/2025 1034    TROPONINT <6 01/25/2025 0928    TROPONINT <6 12/17/2024 1923    TROPONINT <6 12/17/2024 1728    TROPONINT 9 03/15/2024 0423    TROPONINT 10 10/26/2023 2014    TROPONINT 8 10/23/2023 1909    TROPONINT 8 08/19/2023 1628    TROPONINT 6 08/16/2023 1458    TROPONINT <0.010 12/08/2022 0653    TROPONINT <0.010 12/02/2022 1124     Results from last 7 days   Lab Units 01/26/25  0609   CHOLESTEROL mg/dL 163   TRIGLYCERIDES mg/dL 154*   HDL CHOL mg/dL 33*   LDL CHOL mg/dL 103*     Results from last 7 days   Lab Units 01/31/25  0452 01/30/25  0516 01/29/25  0525 01/28/25  0347   ALK PHOS U/L 86 74 68 65   BILIRUBIN mg/dL 0.4 0.5 0.7 0.8   BILIRUBIN DIRECT mg/dL  --   --   --  <0.2   ALT (SGPT) U/L 16 14 17 20   AST (SGOT) U/L 15 16 17 17     Results from last 7 days   Lab Units 01/28/25  0347   TSH uIU/mL 1.520         No results found for: \"POCGLU\"        Past Medical History:   Diagnosis Date    ADHD (attention deficit hyperactivity disorder)     On going issue    Anxiety     Lepe's esophagus     Benign " prostatic hyperplasia Surgery in 12/2021    Brain concussion 11/2023    Clotting disorder Intestinal    Depression     Diverticulitis     Diverticulosis     Duodenitis     Enteritis     Failure to thrive (child)     Family history of blood clots     Fracture of wrist 1985    Fracture, foot 2019    GERD (gastroesophageal reflux disease)     Hyperlipidemia     Hypertension     Hypertensive emergency     Irritable bowel syndrome 2017    Low testosterone     Microcytosis     Pancreatitis     Pneumonia     PONV (postoperative nausea and vomiting)     Seasonal affective disorder     Shoulder injury     Stroke     Unexplained weight loss     Visual impairment 8/2023       Assessment:  Active Hospital Problems    Diagnosis  POA    **Syncope [R55]  Yes    Alcohol withdrawal [F10.939]  Yes    Hypertensive emergency [I16.1]  Yes    History of atrial fibrillation [Z86.79]  Not Applicable    Alcohol use [Z78.9]  Yes    Mood disorder [F39]  Yes    History of CVA (cerebrovascular accident) [Z86.73]  Not Applicable      Resolved Hospital Problems   No resolved problems to display.       Plan:  Hopefully will do better with the Seroquel.  Follow-up on labs.  Hopefully if remains stable possibly can go home this weekend or Monday if continues to improve.    Blaek Beckett MD  1/31/2025  16:06 EST

## 2025-02-01 LAB
ALBUMIN SERPL-MCNC: 3.3 G/DL (ref 3.5–5.2)
ALBUMIN/GLOB SERPL: 1 G/DL
ALP SERPL-CCNC: 81 U/L (ref 39–117)
ALT SERPL W P-5'-P-CCNC: 16 U/L (ref 1–41)
ANION GAP SERPL CALCULATED.3IONS-SCNC: 11.3 MMOL/L (ref 5–15)
AST SERPL-CCNC: 15 U/L (ref 1–40)
BASOPHILS # BLD AUTO: 0.04 10*3/MM3 (ref 0–0.2)
BASOPHILS NFR BLD AUTO: 0.5 % (ref 0–1.5)
BILIRUB SERPL-MCNC: 0.3 MG/DL (ref 0–1.2)
BUN SERPL-MCNC: 9 MG/DL (ref 6–20)
BUN/CREAT SERPL: 9.5 (ref 7–25)
CALCIUM SPEC-SCNC: 9 MG/DL (ref 8.6–10.5)
CHLORIDE SERPL-SCNC: 108 MMOL/L (ref 98–107)
CO2 SERPL-SCNC: 23.7 MMOL/L (ref 22–29)
CREAT SERPL-MCNC: 0.95 MG/DL (ref 0.76–1.27)
DEPRECATED RDW RBC AUTO: 39.9 FL (ref 37–54)
EGFRCR SERPLBLD CKD-EPI 2021: 93.4 ML/MIN/1.73
EOSINOPHIL # BLD AUTO: 0.58 10*3/MM3 (ref 0–0.4)
EOSINOPHIL NFR BLD AUTO: 7.5 % (ref 0.3–6.2)
ERYTHROCYTE [DISTWIDTH] IN BLOOD BY AUTOMATED COUNT: 13.2 % (ref 12.3–15.4)
GLOBULIN UR ELPH-MCNC: 3.4 GM/DL
GLUCOSE SERPL-MCNC: 109 MG/DL (ref 65–99)
HCT VFR BLD AUTO: 43.4 % (ref 37.5–51)
HGB BLD-MCNC: 14.8 G/DL (ref 13–17.7)
IMM GRANULOCYTES # BLD AUTO: 0.03 10*3/MM3 (ref 0–0.05)
IMM GRANULOCYTES NFR BLD AUTO: 0.4 % (ref 0–0.5)
LYMPHOCYTES # BLD AUTO: 1.34 10*3/MM3 (ref 0.7–3.1)
LYMPHOCYTES NFR BLD AUTO: 17.3 % (ref 19.6–45.3)
MCH RBC QN AUTO: 28.5 PG (ref 26.6–33)
MCHC RBC AUTO-ENTMCNC: 34.1 G/DL (ref 31.5–35.7)
MCV RBC AUTO: 83.6 FL (ref 79–97)
MONOCYTES # BLD AUTO: 0.83 10*3/MM3 (ref 0.1–0.9)
MONOCYTES NFR BLD AUTO: 10.7 % (ref 5–12)
NEUTROPHILS NFR BLD AUTO: 4.94 10*3/MM3 (ref 1.7–7)
NEUTROPHILS NFR BLD AUTO: 63.6 % (ref 42.7–76)
NRBC BLD AUTO-RTO: 0 /100 WBC (ref 0–0.2)
PLATELET # BLD AUTO: 279 10*3/MM3 (ref 140–450)
PMV BLD AUTO: 10 FL (ref 6–12)
POTASSIUM SERPL-SCNC: 4 MMOL/L (ref 3.5–5.2)
PROT SERPL-MCNC: 6.7 G/DL (ref 6–8.5)
QT INTERVAL: 300 MS
QTC INTERVAL: 478 MS
RBC # BLD AUTO: 5.19 10*6/MM3 (ref 4.14–5.8)
SODIUM SERPL-SCNC: 143 MMOL/L (ref 136–145)
WBC NRBC COR # BLD AUTO: 7.76 10*3/MM3 (ref 3.4–10.8)

## 2025-02-01 PROCEDURE — 80053 COMPREHEN METABOLIC PANEL: CPT | Performed by: STUDENT IN AN ORGANIZED HEALTH CARE EDUCATION/TRAINING PROGRAM

## 2025-02-01 PROCEDURE — 94799 UNLISTED PULMONARY SVC/PX: CPT

## 2025-02-01 PROCEDURE — 93298 REM INTERROG DEV EVAL SCRMS: CPT | Performed by: INTERNAL MEDICINE

## 2025-02-01 PROCEDURE — 85025 COMPLETE CBC W/AUTO DIFF WBC: CPT | Performed by: INTERNAL MEDICINE

## 2025-02-01 PROCEDURE — 93010 ELECTROCARDIOGRAM REPORT: CPT | Performed by: INTERNAL MEDICINE

## 2025-02-01 PROCEDURE — 94660 CPAP INITIATION&MGMT: CPT

## 2025-02-01 PROCEDURE — 25010000002 CEFTRIAXONE PER 250 MG: Performed by: STUDENT IN AN ORGANIZED HEALTH CARE EDUCATION/TRAINING PROGRAM

## 2025-02-01 RX ORDER — CARVEDILOL 3.12 MG/1
3.12 TABLET ORAL 2 TIMES DAILY WITH MEALS
Status: DISCONTINUED | OUTPATIENT
Start: 2025-02-01 | End: 2025-02-02 | Stop reason: HOSPADM

## 2025-02-01 RX ORDER — AMLODIPINE BESYLATE 5 MG/1
5 TABLET ORAL DAILY
Status: DISCONTINUED | OUTPATIENT
Start: 2025-02-02 | End: 2025-02-02 | Stop reason: HOSPADM

## 2025-02-01 RX ORDER — QUETIAPINE FUMARATE 25 MG/1
50 TABLET, FILM COATED ORAL NIGHTLY
Status: DISCONTINUED | OUTPATIENT
Start: 2025-02-02 | End: 2025-02-02 | Stop reason: HOSPADM

## 2025-02-01 RX ORDER — QUETIAPINE FUMARATE 25 MG/1
25 TABLET, FILM COATED ORAL DAILY
Status: DISCONTINUED | OUTPATIENT
Start: 2025-02-01 | End: 2025-02-02 | Stop reason: HOSPADM

## 2025-02-01 RX ADMIN — FOLIC ACID 1 MG: 1 TABLET ORAL at 08:47

## 2025-02-01 RX ADMIN — DIAZEPAM 20 MG: 5 TABLET ORAL at 01:46

## 2025-02-01 RX ADMIN — ATORVASTATIN CALCIUM 40 MG: 20 TABLET, FILM COATED ORAL at 20:44

## 2025-02-01 RX ADMIN — Medication 100 MG: at 08:47

## 2025-02-01 RX ADMIN — NITROGLYCERIN 0.4 MG: 0.4 TABLET SUBLINGUAL at 00:59

## 2025-02-01 RX ADMIN — Medication 1 TABLET: at 08:47

## 2025-02-01 RX ADMIN — QUETIAPINE FUMARATE 50 MG: 25 TABLET, FILM COATED ORAL at 08:47

## 2025-02-01 RX ADMIN — AMLODIPINE BESYLATE 10 MG: 10 TABLET ORAL at 12:09

## 2025-02-01 RX ADMIN — METOPROLOL TARTRATE 5 MG: 5 INJECTION INTRAVENOUS at 00:41

## 2025-02-01 RX ADMIN — ESCITALOPRAM 10 MG: 10 TABLET, FILM COATED ORAL at 20:44

## 2025-02-01 RX ADMIN — LISINOPRIL 40 MG: 20 TABLET ORAL at 12:09

## 2025-02-01 RX ADMIN — GUAIFENESIN 600 MG: 600 TABLET, MULTILAYER, EXTENDED RELEASE ORAL at 20:44

## 2025-02-01 RX ADMIN — PANTOPRAZOLE SODIUM 40 MG: 40 TABLET, DELAYED RELEASE ORAL at 08:50

## 2025-02-01 RX ADMIN — CEFTRIAXONE SODIUM 1000 MG: 1 INJECTION, POWDER, FOR SOLUTION INTRAMUSCULAR; INTRAVENOUS at 06:51

## 2025-02-01 RX ADMIN — ASPIRIN 81 MG CHEWABLE TABLET 81 MG: 81 TABLET CHEWABLE at 08:47

## 2025-02-01 RX ADMIN — NITROGLYCERIN 0.4 MG: 0.4 TABLET SUBLINGUAL at 00:52

## 2025-02-01 RX ADMIN — GUAIFENESIN 600 MG: 600 TABLET, MULTILAYER, EXTENDED RELEASE ORAL at 08:47

## 2025-02-01 RX ADMIN — CARVEDILOL 3.12 MG: 3.12 TABLET, FILM COATED ORAL at 18:38

## 2025-02-01 NOTE — PLAN OF CARE
Goal Outcome Evaluation:      Alert, up ad chinyere in room. STAT EKG at HS for elevated heart rate in 150-160s revealed a-fib. [facility] notified, see orders. C/o chest pressure, medicated with nitroglycerin x 2 with relief. Slept most of suzanne w CPAP, ambulated around nurses station x 1 lap Pleasant and tolerating care well.

## 2025-02-01 NOTE — PROGRESS NOTES
"    DAILY PROGRESS NOTE  [facility]    Patient Identification:  Name: Flako Coreas Sr.  Age: 59 y.o.  Sex: male  :  1965  MRN: [id number]         Primary Care Physician: Akash Rodriguez Jr., DO    Subjective:  Interval History: History and ROS not the most reliable.  He is essentially snowed right now.  He can arouse and barely keep his eyes open and look at me for a little bit of time but is in no way conversation with fluent speech.He also does not appear to be in any form of DTs.  Resting well through the night.  No complaints of chest pain or troubles breathing.  RN states patient ambulating the halls this morning without issue    Objective: No family at bedside.  RN present with me as chaperone    Scheduled Meds:[START ON 2025] amLODIPine, 5 mg, Oral, Daily  apixaban, 5 mg, Oral, Q12H  aspirin, 81 mg, Oral, Daily  atorvastatin, 40 mg, Oral, Nightly  carvedilol, 3.125 mg, Oral, BID With Meals  cefTRIAXone, 1,000 mg, Intravenous, Q24H  escitalopram, 10 mg, Oral, Nightly  folic acid, 1 mg, Oral, Daily  guaiFENesin, 600 mg, Oral, Q12H  hydrocortisone, 25 mg, Rectal, BID  lisinopril, 40 mg, Oral, Daily  multivitamin with minerals, 1 tablet, Oral, Daily  pantoprazole, 40 mg, Oral, QAM AC  QUEtiapine, 50 mg, Oral, Q12H  senna-docusate sodium, 2 tablet, Oral, BID  thiamine, 100 mg, Oral, Daily      Continuous Infusions:     Vital signs in last 24 hours:  Temp:  [98.6 °F (37 °C)-99.3 °F (37.4 °C)] 98.6 °F (37 °C)  Heart Rate:  [] 157  Resp:  [16-20] 18  BP: (101-158)/() 132/88    Intake/Output:  No intake or output data in the 24 hours ending 25 1243    Exam:  /88 (BP Location: Left arm, Patient Position: Sitting)   Pulse (!) 157   Temp 98.6 °F (37 °C) (Oral)   Resp 18   Ht 188 cm (74\")   Wt 103 kg (227 lb 4.7 oz)   SpO2 97%   BMI 29.18 kg/m²     General Appearance:    Alerts but can keep eyes open for very long, oversedated                          " head:    Normocephalic, without obvious abnormality, atraumatic                           Eyes:    PERRLA/EOM's intact, both eyes                         Throat:   Oral mucosa pink and moist                           Neck:   Supple, symmetrical, trachea midline, no JVD                         Lungs:    Clear to auscultation bilaterally, respirations unlabored                 Chest Wall:    No tenderness or deformity                          Heart:    Regular rate and rhythm, S1 and S2 normal                  Abdomen:     Soft, nontender, bowel sounds active                 Extremities: Moving all, nontremulous, no cyanosis or edema                        Pulses:   Pulses palpable in all extremities                            Skin:   Skin is warm and dry, nonjaundiced                  Neurologic:   CNII-XII intact, no focal deficits noted     Data Review:  Labs in chart were reviewed.    Assessment:  Active Hospital Problems    Diagnosis  POA    **Syncope [R55]  Yes    Alcohol withdrawal [F10.939]  Yes    Hypertensive emergency [I16.1]  Yes    History of atrial fibrillation [Z86.79]  Not Applicable    Alcohol use [Z78.9]  Yes    Mood disorder [F39]  Yes    History of CVA (cerebrovascular accident) [Z86.73]  Not Applicable      Resolved Hospital Problems   No resolved problems to display.       Plan:    Alcohol abuse with detoxification/mood disorder requiring ICU admission/Precedex drip   -CIWA now discontinued   -Psychiatry following   -Scheduled Seroquel twice daily      Expect lability of blood pressure due to detox/hospitalization and lack alcohol   -Reduce Norvasc to 5 mg daily did not provoke edema   -Restart Coreg 3.125 mg twice daily   -Lisinopril 40 mg daily   -Avoid over control -only treat with a as needed if systolic greater than 240 or diastolic greater than 120   -While he may have some blood pressures 140/118 he also has others in the same 24 hours 103/79      Cardiology evaluated for syncope.  Loop  recorder negative.  Surely syncope has to be related around lifestyle and abuse issues   -AC canceled per cardiology given risk first benefits of repeated falls    BiPAP per pulmonology    Doubtful UTI -3 days Rocephin started per pulmonary.  Culture pending    Labs overall reassuring    Disposition -PT OT following becoming medically ready for discharge.  Today I have removed CIWA and decreased a.m. Seroquel from 50 mg down to 25 mg.  I need to see this patient little bit more alert and conversational and hopefully that is achieved by tomorrow and we can prep for discharge but he is still a bit too oversedated today    Case discussed with managing RN at bedside as chaperone    Long-term prognosis nonideal/poor    Sebastian Gonsalez MD  2/1/2025  12:43 EST

## 2025-02-01 NOTE — PROGRESS NOTES
"The patient continues to apologize for his actions earlier during hospitalization, but also expresses some anger at \"having been tied down\".  He seems less confused today though he continues to exhibit a bit of clouding of consciousness.  He, himself, states that he does not feel as though he is at 100% mentation.    Chart review indicates that the patient had been on Viibryd prior to admission.  He had not been able to identify this medication to me and thus was started on Lexapro and Seroquel.  I will discuss reinitiation of Viibryd with him.  "

## 2025-02-01 NOTE — NURSING NOTE
[facility] follow-up regarding alcohol use and mood disturbance. Per RN he is anxious, walks around the unit, appears to want to elope. Upon entering room he is awake lying in bed, he has a visitor present and gave permission to speak in front of them. He is irritable/sarcastic, wants to go home. He is not happy with the treatment he has received; feels has been mistreated.  Is upset about the violent banner placed on his chart. Has concerns related to post stroke residual symptoms from previous stroke such as memory concerns and mobility issues. He adamantly denies issues with alcohol use, visitor concurs. He denies SI and HI. He was provided with outpatient mental health resources such as [facility] and Dr. Ben Schoenbachler.    Access following though he hopes to be discharged today with HH (RN informed of his wished). Patient reports his son would be able to stay with him.

## 2025-02-01 NOTE — PROGRESS NOTES
[facility]  [phone]  Dr. Pool Nevarez     Subjective:  LOS: 5    Chief Complaint:   syncope and Increasing agitation     Pt is agitated regarding the alcohol withdrawal diagnosis and violent behavior warning on his chart. Also still worried about the episode of shortness of breath he experience on arrival to ICU     Objective   Vital Signs past 24hrs  Temp range: Temp (24hrs), Av.2 °F (37.3 °C), Min:98.8 °F (37.1 °C), Max:99.5 °F (37.5 °C)    BP range: BP: (131-158)/() 158/94  Pulse range: Heart Rate:  [] 94  Resp rate range: Resp:  [16-18] 18  Device (Oxygen Therapy): room air   Oxygen range:SpO2:  [93 %-98 %] 98 %   Mechanical Ventilator:     Physical Exam  Vitals and nursing note reviewed.   Constitutional:       General: He is awake. He is not in acute distress.  HENT:      Head: Normocephalic and atraumatic.   Cardiovascular:      Rate and Rhythm: Normal rate and regular rhythm.      Heart sounds: No murmur heard.  Pulmonary:      Effort: Pulmonary effort is normal. No respiratory distress.      Breath sounds: Normal breath sounds. No wheezing, rhonchi or rales.   Abdominal:      General: Abdomen is flat.      Tenderness: There is no abdominal tenderness.   Skin:     General: Skin is warm and dry.      Findings: No rash.   Neurological:      Mental Status: He is alert.       Results Review:    I have reviewed the laboratory and imaging data since the last note by [facility] physician.  My annotations are noted in assessment and plan.      Result Review:  I have personally reviewed the results from last note by [facility] physician to 2025 21:02 EST and agree with these findings:  [x]  Laboratory list / accordion  [x]  Microbiology  [x]  Radiology  [x]  EKG/Telemetry   [x]  Cardiology/Vascular   [x]  Pathology  [x]  Old records  []  Other:    Medication Review:  I have reviewed the current MAR.  My annotations are noted in assessment and plan.    amLODIPine, 10 mg, Oral,  Daily  aspirin, 81 mg, Oral, Daily  atorvastatin, 40 mg, Oral, Nightly  [START ON 2/1/2025] cefTRIAXone, 1,000 mg, Intravenous, Q24H  escitalopram, 10 mg, Oral, Nightly  folic acid, 1 mg, Oral, Daily  guaiFENesin, 600 mg, Oral, Q12H  hydrocortisone, 25 mg, Rectal, BID  lisinopril, 40 mg, Oral, Daily  [Held by provider] metoprolol tartrate, 25 mg, Oral, Q12H  multivitamin with minerals, 1 tablet, Oral, Daily  pantoprazole, 40 mg, Oral, QAM AC  QUEtiapine, 50 mg, Oral, Q12H  senna-docusate sodium, 2 tablet, Oral, BID  thiamine, 100 mg, Oral, Daily           Lines, Drains & Airways       Active LDAs       Name Placement date Placement time Site Days    Peripheral IV 01/28/25 0124 Posterior;Right Hand 01/28/25  0124  Hand  3    Peripheral IV 01/28/25 1052 Anterior;Right Antecubital 01/28/25  1052  Antecubital  3    Peripheral IV 01/28/25 1052 Left;Posterior Hand 01/28/25  1052  Hand  below thumb  3                  No active isolations  Diet Orders (active) (From admission, onward)       Start     Ordered    01/25/25 1624  Diet: Cardiac; Healthy Heart (2-3 Na+); Fluid Consistency: Thin (IDDSI 0)  Diet Effective Now         01/25/25 1623                      Assessment  Acute toxic/metabolic Encephalopathy  Suspected Alcohol withdrawal with worsening agitation and combativeness  Syncope  Intermittent dyspnea  Hypertensive emergency  Mood disorder  History of atrial fibrillation  Gastrosoft reflux disease with esophagitis and Lepe's esophagus  Diverticulosis     Plan  -Patient presenting with syncope and prior history of heavy alcohol use.  Was found to have an alcohol level of 0.  Has been managed his alcohol withdrawal on the floor.  Brought to ICU due to significant agitation and aggressive behavior  - Behavioral health consulted  - weaned off precedex  - Monitor blood pressure  -CTA head/neck and MRI brain both negative for any acute intracranial pathology  -Access and psychiatry consulted and following  -  Neurology consulted and appreciate input.  Given the patient was confirmed to be heavily drinking a few days per week with negative scans likely all due to alcohol withdrawal.  Signed off.  - Cardiology consulted regarding the syncope.  Loop recorder negative for any explanation to syncopal events.  Anticoagulation being held due to frequent falls.  Signed off  -  transfer to floor. Hospitalist to continue following and assume primary on transfer out of ICU. We will see again if he has another episode of dyspnea but had normal CTA chest on admission and CXR on 1/26 which was clear and his O2 saturation has been great on room air.       Pool Nevarez DO   01/31/25  21:02 EST      Part of this note may be an electronic transcription/translation of spoken language to printed text using the Dragon Dictation System.

## 2025-02-02 ENCOUNTER — READMISSION MANAGEMENT (OUTPATIENT)
Dept: CALL CENTER | Facility: HOSPITAL | Age: 60
End: 2025-02-02

## 2025-02-02 VITALS
BODY MASS INDEX: 29.17 KG/M2 | WEIGHT: 227.29 LBS | DIASTOLIC BLOOD PRESSURE: 90 MMHG | SYSTOLIC BLOOD PRESSURE: 146 MMHG | RESPIRATION RATE: 20 BRPM | TEMPERATURE: 98 F | OXYGEN SATURATION: 96 % | HEART RATE: 75 BPM | HEIGHT: 74 IN

## 2025-02-02 PROBLEM — I16.1 HYPERTENSIVE EMERGENCY: Status: RESOLVED | Noted: 2025-01-25 | Resolved: 2025-02-02

## 2025-02-02 LAB — BACTERIA SPEC AEROBE CULT: ABNORMAL

## 2025-02-02 PROCEDURE — 94799 UNLISTED PULMONARY SVC/PX: CPT

## 2025-02-02 PROCEDURE — 25010000002 CEFTRIAXONE PER 250 MG: Performed by: STUDENT IN AN ORGANIZED HEALTH CARE EDUCATION/TRAINING PROGRAM

## 2025-02-02 PROCEDURE — 94660 CPAP INITIATION&MGMT: CPT

## 2025-02-02 RX ORDER — MULTIPLE VITAMINS W/ MINERALS TAB 9MG-400MCG
1 TAB ORAL DAILY
Start: 2025-02-03

## 2025-02-02 RX ORDER — ATORVASTATIN CALCIUM 40 MG/1
40 TABLET, FILM COATED ORAL NIGHTLY
Qty: 30 TABLET | Refills: 0 | Status: SHIPPED | OUTPATIENT
Start: 2025-02-02

## 2025-02-02 RX ORDER — AMLODIPINE BESYLATE 5 MG/1
5 TABLET ORAL DAILY
Qty: 30 TABLET | Refills: 0 | Status: SHIPPED | OUTPATIENT
Start: 2025-02-03

## 2025-02-02 RX ORDER — LANOLIN ALCOHOL/MO/W.PET/CERES
100 CREAM (GRAM) TOPICAL DAILY
Qty: 30 TABLET | Refills: 0 | Status: SHIPPED | OUTPATIENT
Start: 2025-02-03

## 2025-02-02 RX ORDER — FOLIC ACID 1 MG/1
1 TABLET ORAL DAILY
Qty: 30 TABLET | Refills: 0 | Status: SHIPPED | OUTPATIENT
Start: 2025-02-03

## 2025-02-02 RX ORDER — DIAZEPAM 2 MG/1
2 TABLET ORAL EVERY 12 HOURS PRN
Qty: 5 TABLET | Refills: 0 | Status: SHIPPED | OUTPATIENT
Start: 2025-02-02

## 2025-02-02 RX ORDER — HYDROCORTISONE ACETATE 25 MG/1
25 SUPPOSITORY RECTAL 2 TIMES DAILY
Qty: 9 SUPPOSITORY | Refills: 0 | Status: SHIPPED | OUTPATIENT
Start: 2025-02-02 | End: 2025-02-07

## 2025-02-02 RX ORDER — PANTOPRAZOLE SODIUM 40 MG/1
40 TABLET, DELAYED RELEASE ORAL
Qty: 30 TABLET | Refills: 0 | Status: SHIPPED | OUTPATIENT
Start: 2025-02-03

## 2025-02-02 RX ADMIN — ASPIRIN 81 MG CHEWABLE TABLET 81 MG: 81 TABLET CHEWABLE at 08:41

## 2025-02-02 RX ADMIN — GUAIFENESIN 600 MG: 600 TABLET, MULTILAYER, EXTENDED RELEASE ORAL at 08:41

## 2025-02-02 RX ADMIN — Medication 100 MG: at 08:41

## 2025-02-02 RX ADMIN — PANTOPRAZOLE SODIUM 40 MG: 40 TABLET, DELAYED RELEASE ORAL at 08:51

## 2025-02-02 RX ADMIN — Medication 1 TABLET: at 08:41

## 2025-02-02 RX ADMIN — LISINOPRIL 40 MG: 20 TABLET ORAL at 11:32

## 2025-02-02 RX ADMIN — CARVEDILOL 3.12 MG: 3.12 TABLET, FILM COATED ORAL at 08:41

## 2025-02-02 RX ADMIN — FOLIC ACID 1 MG: 1 TABLET ORAL at 08:41

## 2025-02-02 RX ADMIN — QUETIAPINE FUMARATE 25 MG: 25 TABLET ORAL at 08:41

## 2025-02-02 RX ADMIN — AMLODIPINE BESYLATE 5 MG: 5 TABLET ORAL at 11:32

## 2025-02-02 NOTE — PLAN OF CARE
Problem: Comorbidity Management  Goal: Maintenance of Behavioral Health Symptom Control  Outcome: Progressing  Intervention: Maintain Behavioral Health Symptom Control  Recent Flowsheet Documentation  Taken 2/2/2025 0408 by Flora Barrett RN  Medication Review/Management: medications reviewed  Taken 2/2/2025 0210 by Flora Barrett RN  Medication Review/Management: medications reviewed  Taken 2/2/2025 0041 by Flora Barrett RN  Medication Review/Management: medications reviewed  Taken 2/1/2025 2208 by Flora Barrett RN  Medication Review/Management: medications reviewed  Taken 2/1/2025 2044 by Flora Barrett RN  Medication Review/Management: medications reviewed     Problem: Skin Injury Risk Increased  Goal: Skin Health and Integrity  Outcome: Progressing  Intervention: Optimize Skin Protection  Recent Flowsheet Documentation  Taken 2/2/2025 0408 by Flora Barrett RN  Head of Bed (HOB) Positioning: HOB at 20-30 degrees  Taken 2/2/2025 0210 by Flora Barrett RN  Head of Bed (HOB) Positioning: HOB at 20-30 degrees  Taken 2/2/2025 0041 by Flora Barrett RN  Head of Bed (HOB) Positioning: HOB at 20-30 degrees  Taken 2/1/2025 2208 by Flora Barrett RN  Pressure Reduction Techniques: frequent weight shift encouraged  Head of Bed (HOB) Positioning: HOB at 20-30 degrees  Pressure Reduction Devices: pressure-redistributing mattress utilized  Skin Protection: incontinence pads utilized  Taken 2/1/2025 2044 by Flora Barrett RN  Head of Bed (HOB) Positioning: HOB at 20-30 degrees     Problem: Fall Injury Risk  Goal: Absence of Fall and Fall-Related Injury  Outcome: Progressing  Intervention: Identify and Manage Contributors  Recent Flowsheet Documentation  Taken 2/2/2025 0408 by Flora Barrett RN  Medication Review/Management: medications reviewed  Taken 2/2/2025 0210 by Flora Barrett RN  Medication Review/Management: medications reviewed  Taken 2/2/2025 0041 by Flora Barrett RN  Medication Review/Management: medications reviewed  Taken  2/1/2025 2208 by Flora Barrett RN  Medication Review/Management: medications reviewed  Taken 2/1/2025 2044 by Flora Barrett RN  Medication Review/Management: medications reviewed  Intervention: Promote Injury-Free Environment  Recent Flowsheet Documentation  Taken 2/2/2025 0408 by Flora Barrett RN  Safety Promotion/Fall Prevention: safety round/check completed  Taken 2/2/2025 0210 by Flora Barrett RN  Safety Promotion/Fall Prevention: safety round/check completed  Taken 2/2/2025 0041 by Flora Barrett RN  Safety Promotion/Fall Prevention:   activity supervised   assistive device/personal items within reach   clutter free environment maintained   fall prevention program maintained   lighting adjusted   nonskid shoes/slippers when out of bed   room organization consistent   safety round/check completed  Taken 2/1/2025 2208 by Flora Barrett RN  Safety Promotion/Fall Prevention: safety round/check completed  Taken 2/1/2025 2044 by Floar Barrett RN  Safety Promotion/Fall Prevention: safety round/check completed     Problem: Restraint, Nonviolent  Goal: Absence of Harm or Injury  Outcome: Progressing  Intervention: Implement Least Restrictive Safety Strategies  Recent Flowsheet Documentation  Taken 2/1/2025 2208 by Flora Barrett RN  Diversional Activities: television  Intervention: Protect Dignity, Rights and Personal Wellbeing  Recent Flowsheet Documentation  Taken 2/1/2025 2208 by Flora Barrett RN  Trust Relationship/Rapport:   care explained   choices provided   questions answered   reassurance provided  Intervention: Protect Skin and Joint Integrity  Recent Flowsheet Documentation  Taken 2/2/2025 0408 by Flora Barrett RN  Body Position: position changed independently  Taken 2/2/2025 0210 by Flora Barrett RN  Body Position: position changed independently  Taken 2/2/2025 0041 by Flora Barrett RN  Body Position: position changed independently  Range of Motion: active ROM (range of motion) encouraged  Taken 2/1/2025 2208  by Barrett, Flora, RN  Body Position: position changed independently  Skin Protection: incontinence pads utilized  Range of Motion: active ROM (range of motion) encouraged  Taken 2/1/2025 2044 by Flora Barrett RN  Body Position: position changed independently     Problem: Violence Risk or Actual  Goal: Anger and Impulse Control  Outcome: Progressing  Intervention: Minimize Safety Risk  Recent Flowsheet Documentation  Taken 2/2/2025 0408 by Flora Barrett RN  Enhanced Safety Measures: bed alarm set  Taken 2/2/2025 0210 by Flora Barrett RN  Enhanced Safety Measures: bed alarm set  Taken 2/2/2025 0041 by Flora Barrett RN  Enhanced Safety Measures: bed alarm refused  Taken 2/1/2025 2208 by Flora Barrett RN  Enhanced Safety Measures: bed alarm set  Taken 2/1/2025 2044 by Flora Barrett RN  Enhanced Safety Measures: bed alarm refused     Problem: Noninvasive Ventilation Acute  Goal: Effective Unassisted Ventilation and Oxygenation  Outcome: Progressing     Problem: Adult Inpatient Plan of Care  Goal: Plan of Care Review  Outcome: Progressing  Flowsheets (Taken 2/2/2025 0520)  Progress: no change  Plan of Care Reviewed With: patient   Goal Outcome Evaluation:  Plan of Care Reviewed With: patient        Progress: no change

## 2025-02-02 NOTE — NURSING NOTE
Access Center follow-up regarding alcohol and mood disturbance. Per RN patient appears to be improving. Upon entering room he is sitting on side of the bed. He is calm and pleasant. He reports being able to think better and feels more clear. Discussed again outpatient mental health resources. He was appreciative      Access following.

## 2025-02-02 NOTE — PLAN OF CARE
Goal Outcome Evaluation:  Plan of Care Reviewed With: patient        Progress: improving  Outcome Evaluation: much more alert and oriented, walked several times around the unit, RA, psych refcommendations at bedside, BP improved, discharge home

## 2025-02-02 NOTE — DISCHARGE SUMMARY
Date of Discharge:  2/2/2025    PCP: Akash Rodriguez Jr., DO    Discharge Diagnosis:   Active Hospital Problems    Diagnosis  POA    **Syncope [R55]  Yes    Alcohol withdrawal [F10.939]  Yes    History of atrial fibrillation [Z86.79]  Not Applicable    Alcohol use [Z78.9]  Yes    Mood disorder [F39]  Yes    History of CVA (cerebrovascular accident) [Z86.73]  Not Applicable      Resolved Hospital Problems    Diagnosis Date Resolved POA    Hypertensive emergency [I16.1] 02/02/2025 Yes          Consults       Date and Time Order Name Status Description    1/28/2025  8:57 AM Inpatient Neurology Consult General Completed     1/27/2025  4:19 PM Inpatient Gastroenterology Consult Completed     1/26/2025  4:01 PM Inpatient Psychiatrist Consult Completed     1/25/2025  4:23 PM Inpatient Cardiology Consult Completed           Hospital Course  Patient is a 59 y.o. male who initially was admitted for reported syncope.  This patient apparently lives on his own and at disposition he states his son can stay with him over the course the next week and he has additional friends to help as well but confirming his lifestyle and past histories is impossible.  He was seen in consultation by pulmonology cardiology psychiatry and neurology as well as GI.  To summarize this hospitalization, we have concerns about alcohol abuse with DTs compounded by mood disorder.  He was formally on Viibryd and that is nonformulary here.  While here we did transition over to Lexapro with Seroquel with the assistance of psychiatry.  I discussed the case with the psychiatrist on day of discharge and he is requested the patient go back on Viibryd as opposed to prescribing the 2 above medications.  His issues with any type of detox have completely resolved.  Over the last 24 hours he is really perked up and he is completely alert and oriented x 3, clinically much better and ambulating independently.  There is lability of his blood pressure and a lot of  this is due to underlying anxieties and current hospitalization.  At discharge follow-up medicines as outlined below with Norvasc 5 mg daily, Coreg 3.125 mg twice daily and lisinopril 40 mg daily.  Cardiology evaluated for syncope and loop recorder was negative.  Surely his syncope was influenced by lifestyle and alcohol abuse but AC has been canceled per cardiology recommendations given his risk outweighing benefits due to repeated falls.  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  He states he has an upcoming sleep study pending and he was advised to keep that appointment.  We doubt he had an actual UTI though he is already completed his regimen with Rocephin and no additional antibiotics are warranted.  Urine culture grew penicillin resistant Klebsiella.  I personally favor contamination of that culture as it was a clean-catch in a patient who is encephalopathic at that time.  His labs are overall reassuring.  Ultimately at this point in time clinical success is dependent upon his lifestyle as well as choices.  He is medically stable at this point in time to be discharged from home and he understands importance of all different modalities of follow-up.  Case discussed with RN present at bedside as no family at bedside and we went over all to ensure a safe disposition to home.  I am going to order home health for nursing and to ensure he is taking his medications correctly as well as PT and a home evaluation.  Patient is in agreement with above and actually thankful for the care he received bedtime at disposition and with his kimball clinical improvement, I feel more comfortable with the idea of going home today.    Addendum -patient requested Valium to help with issues of panic attack and I think he is undergoing intermittent panic attack.  I am guarded with use of narcotic for this patient but I did dispense a low number #5 with no refills at the  time of discharge.    Temp:  [97.5 °F (36.4 °C)-98.6 °F (37 °C)] 98 °F (36.7 °C)  Heart Rate:  [64-75] 75  Resp:  [15-20] 20  BP: (109-172)/() 146/90  Body mass index is 29.18 kg/m².    Physical Exam  Constitutional:       Comments: Affect a bit flat/blunted but otherwise conversational and pleasant with fluent speech.  No family at bedside.  RN present as chaperone   HENT:      Head: Normocephalic.      Nose: Nose normal.      Mouth/Throat:      Mouth: Mucous membranes are moist.      Pharynx: Oropharynx is clear.   Eyes:      Extraocular Movements: Extraocular movements intact.      Conjunctiva/sclera: Conjunctivae normal.      Pupils: Pupils are equal, round, and reactive to light.   Cardiovascular:      Rate and Rhythm: Normal rate and regular rhythm.   Pulmonary:      Effort: Pulmonary effort is normal. No respiratory distress.      Breath sounds: Normal breath sounds.   Abdominal:      General: Bowel sounds are normal. There is no distension.      Palpations: Abdomen is soft.      Tenderness: There is no abdominal tenderness.   Musculoskeletal:         General: No swelling.   Skin:     General: Skin is warm and dry.      Coloration: Skin is not jaundiced.   Neurological:      Mental Status: He is alert and oriented to person, place, and time. Mental status is at baseline.      Cranial Nerves: No cranial nerve deficit.      Motor: No weakness.      Gait: Gait normal.       Disposition: Home or Self Care       Discharge Medications        New Medications        Instructions Start Date   atorvastatin 40 MG tablet  Commonly known as: LIPITOR   40 mg, Oral, Nightly      folic acid 1 MG tablet  Commonly known as: FOLVITE   1 mg, Oral, Daily   Start Date: February 3, 2025     hydrocortisone 25 MG suppository  Commonly known as: ANUSOL-HC   25 mg, Rectal, 2 Times Daily      multivitamin with minerals tablet tablet   1 tablet, Oral, Daily   Start Date: February 3, 2025     pantoprazole 40 MG EC tablet  Commonly  known as: PROTONIX   40 mg, Oral, Every Morning Before Breakfast   Start Date: February 3, 2025            Changes to Medications        Instructions Start Date   amLODIPine 5 MG tablet  Commonly known as: NORVASC  What changed:   medication strength  how much to take   5 mg, Oral, Daily   Start Date: February 3, 2025     lisinopril 40 MG tablet  Commonly known as: PRINIVILZESTRIL  What changed: Another medication with the same name was removed. Continue taking this medication, and follow the directions you see here.   40 mg, Oral, Daily             Continue These Medications        Instructions Start Date   ASPIRIN 81 PO   Take  by mouth.      carvedilol 3.125 MG tablet  Commonly known as: COREG   3.125 mg, 2 Times Daily With Meals      thiamine 100 MG tablet  Commonly known as: VITAMIN B1   100 mg, Oral, Daily   Start Date: February 3, 2025     vilazodone 40 MG tablet tablet  Commonly known as: Viibryd   40 mg, Oral, Daily      vitamin B-12 500 MCG tablet  Commonly known as: CYANOCOBALAMIN   500 mcg, Oral, Daily             Stop These Medications      apixaban 5 MG tablet tablet  Commonly known as: ELIQUIS     hydrALAZINE 25 MG tablet  Commonly known as: APRESOLINE               Additional Instructions for the Follow-ups that You Need to Schedule       Discharge Follow-up with PCP   As directed       Currently Documented PCP:    Akash Rodriguez Jr., DO    PCP Phone Number:    [phone]     Follow Up Details: PCP 1 to 2 weeks.  Psychiatry per their recommendations.  Remaining consultants pulmonology/cardiology/neurology per their recommendation               Follow-up Information       Akash Rodriguez Jr., DO .    Specialties: Sports Medicine, Family Medicine, Emergency Medicine  Why: PCP 1 to 2 weeks.  Psychiatry per their recommendations.  Remaining consultants pulmonology/cardiology/neurology per their recommendation  Contact information:  [address]  [address]  [phone]                            Future Appointments   Date Time Provider Department Center   2/3/2025  3:00 PM Kelsey Martinez APRN [facility] None        Sebastian Gonsalez MD  [facility]  02/02/25 12:33 EST    Discharge time spent greater than 30 minutes.

## 2025-02-03 ENCOUNTER — TELEPHONE (OUTPATIENT)
Dept: CARDIOLOGY | Facility: CLINIC | Age: 60
End: 2025-02-03

## 2025-02-03 ENCOUNTER — TRANSITIONAL CARE MANAGEMENT TELEPHONE ENCOUNTER (OUTPATIENT)
Dept: CALL CENTER | Facility: HOSPITAL | Age: 60
End: 2025-02-03

## 2025-02-03 ENCOUNTER — TELEPHONE (OUTPATIENT)
Dept: SPORTS MEDICINE | Facility: CLINIC | Age: 60
End: 2025-02-03

## 2025-02-03 NOTE — TELEPHONE ENCOUNTER
Remote received- 6 new AF labeled events all from 2/1/25- longest is 28 mins with total time that day of 86 minutes. AF burden is 1.7% since 1/27/25. V rates were 140s-170s during events:

## 2025-02-03 NOTE — TELEPHONE ENCOUNTER
I called and spoke with patient about loop results and Dr. Taylor' recommendations.  His son is over at his house now.  They have gotten all of this pills organized according to his recent AVS.  Patient is in need of a couple or refills on his non-cardiac medications.  He was able to get rescheduled to see his PCP tomorrow.      He is going to start his coreg back at 3.125 mg BID.      I have scheduled to patient to see LR next Weds 2/12 at [facility] location, per pt request.    Patient feels a lot better now about the plan.    He has no further questions right now.    Sherie Valdez RN  [facility] Triage  02/03/25 13:48 EST

## 2025-02-03 NOTE — TELEPHONE ENCOUNTER
Device nurses, can anyone do a loop interrogation?  After that is done I will call pt to get him a follow up.    Thanks!    Sherie Valdez RN  [facility] Triage  02/03/25 12:45 EST

## 2025-02-03 NOTE — OUTREACH NOTE
Prep Survey      Flowsheet Row Responses   [facility] patient discharged from? [place]   Is LACE score < 7 ? No   Eligibility [facility]   Date of Admission 01/25/25   Date of Discharge 02/02/25   Discharge Disposition Home or Self Care   Discharge diagnosis syncope, alcohol withdrawal   Does the patient have one of the following disease processes/diagnoses(primary or secondary)? Other   Does the patient have Home health ordered? Yes   What is the Home health agency?  HH ordered   Is there a DME ordered? No   Prep survey completed? Yes            Maryanne BARRIOS - Registered Nurse

## 2025-02-03 NOTE — OUTREACH NOTE
Call Center TCM Note      Flowsheet Row Responses   [facility] patient discharged from? [place]   Does the patient have one of the following disease processes/diagnoses(primary or secondary)? Other   TCM attempt successful? No   Unsuccessful attempts Attempt 2            Elizabeth Peace LPN    2/3/2025, 16:34 EST

## 2025-02-03 NOTE — TELEPHONE ENCOUNTER
Patient called to schedule Hospital F/UP appointment. His BP is high. 154/101. He stated that he called cardiology and was told to schedule with PCP. He request the appointment for today.   I scheduled appointment tomorrow 02/04/2025.     Please advise if you want to get him in sooner?

## 2025-02-03 NOTE — OUTREACH NOTE
Call Center TCM Note      Flowsheet Row Responses   [facility] patient discharged from? [place]   Does the patient have one of the following disease processes/diagnoses(primary or secondary)? Other   TCM attempt successful? No   Unsuccessful attempts Attempt 1            Elizabeth Peace LPN    2/3/2025, 10:23 EST

## 2025-02-03 NOTE — TELEPHONE ENCOUNTER
Outgoing call to the patient and relayed instructions per Dr. Rodriguez. All was verbally understood.     Thanks  Priti

## 2025-02-03 NOTE — CASE MANAGEMENT/SOCIAL WORK
Case Management Discharge Note      Final Note: Home, no additional CCP needs.         Selected Continued Care - Discharged on 2/2/2025 Admission date: 1/25/2025 - Discharge disposition: Home or Self Care      Destination    No services have been selected for the patient.                Durable Medical Equipment    No services have been selected for the patient.                Dialysis/Infusion    No services have been selected for the patient.                Home Medical Care    No services have been selected for the patient.                Therapy    No services have been selected for the patient.                Community Resources    No services have been selected for the patient.                Community & DME    No services have been selected for the patient.                         Final Discharge Disposition Code: 01 - home or self-care

## 2025-02-03 NOTE — TELEPHONE ENCOUNTER
EK pt and follows with EP.  He has a loop recorder    Patient is calling with concern for elevated /112 HR 91.  He was discharged yesterday after an 8 day hospital stay for syncope, concern for alcohol withdrawal and DTs/mood disorder.  He was taken off eliquis this stay due to risk outweighing benefit.  Pt states he did not have alcohol withdrawal.     Patient is concerned about his BP.  He is not really having any symptoms.  He states he cancelled his appt with his PCP this morning.  At one point he thought he had just been discharged with morning.  Our entire conversation was hard to follow.      I went over his medications with him on the phone.  He is taking amlodipine 5 mg daily, lisinopril 40 mg.  He took those this morning.  He also reports he took  mg tablet of hydralazine to try to get his BP down.  He took those at 0600 and around 9-10.     He is supposed to be taking coreg 3.125 BID.  He has 6.25 tablets at home and I advised he cut those in half and take half a tablet twice daily.    Patient does have his AVS from hospital stay.  I encouraged him to sit down today and use the AVS as a guide to what medications he should be on.  He reports having family support so I advised if he cannot do that by himself to seek help from a family member.  I also advised that he needs to follow up with PCP as soon as possible.    Dr. Taylor the patient is worried that he could be in afib too.  He is worried he is going to have a stroke.  I can send pacemaker a message if you think we need to check his loop.  Appears he has had hx of labile BP.    He has cancelled several appts with our office since you saw him last.  I see that he has hx of labile HTN.      Does he just need an office follow up?    Sherie Valdez RN  [facility] Triage  02/03/25 12:04 EST

## 2025-02-04 ENCOUNTER — NURSE TRIAGE (OUTPATIENT)
Dept: CALL CENTER | Facility: HOSPITAL | Age: 60
End: 2025-02-04

## 2025-02-04 ENCOUNTER — TELEPHONE (OUTPATIENT)
Dept: SPORTS MEDICINE | Facility: CLINIC | Age: 60
End: 2025-02-04

## 2025-02-04 ENCOUNTER — TELEPHONE (OUTPATIENT)
Dept: NEUROLOGY | Facility: CLINIC | Age: 60
End: 2025-02-04

## 2025-02-04 ENCOUNTER — TRANSITIONAL CARE MANAGEMENT TELEPHONE ENCOUNTER (OUTPATIENT)
Dept: CALL CENTER | Facility: HOSPITAL | Age: 60
End: 2025-02-04

## 2025-02-04 NOTE — OUTREACH NOTE
Call Center TCM Note      Flowsheet Row Responses   [facility] patient discharged from? [place]   Does the patient have one of the following disease processes/diagnoses(primary or secondary)? Other   TCM attempt successful? Yes   Call start time 1148   Call end time 1203   Discharge diagnosis syncope, alcohol withdrawal   Person spoke with today (if not patient) and relationship pt   Meds reviewed with patient/caregiver? Yes   Is the patient having any side effects they believe may be caused by any medication additions or changes? No   Does the patient have all medications ordered at discharge? Yes   Is the patient taking all medications as directed (includes completed medication regime)? Yes   Does the patient have an appointment with their PCP within 7-14 days of discharge? No  [Pt had PCP appt 2/4/25 that was cancelled r/t no insurance]   Nursing Interventions Routed TCM call to PCP office, PCP office requested to make appointment - message sent   What is the Home health agency?  HH ordered   Has home health visited the patient within 72 hours of discharge? Unsure   Psychosocial issues? No   Did the patient receive a copy of their discharge instructions? Yes   Nursing interventions Reviewed instructions with patient   What is the patient's perception of their health status since discharge? Same   Is the patient/caregiver able to teach back signs and symptoms related to disease process for when to call PCP? Yes   Is the patient/caregiver able to teach back signs and symptoms related to disease process for when to call 911? Yes   Is the patient/caregiver able to teach back the hierarchy of who to call/visit for symptoms/problems? PCP, Specialist, Home health nurse, Urgent Care, ED, 911 Yes   If the patient is a current smoker, are they able to teach back resources for cessation? Not a smoker   TCM call completed? Yes   Wrap up additional comments Pt reports last /129. Pt advised to take BP while on  phone with this RN. Pt's BP now 170/114. Pt had already taken all his BP medications this AM, amlodipine, coreg, lisinopril with new dosage changes. Pt advised to take .5 tab valium as pt was given for anxiety. Pt took BP again, and BP trending down 165/111. Pt had a PCP fu appt today, and was told insurance would not cover this appt so it was cancelled and pt was not seen. Pt was advised if BP elevated to a systolic 180+ that he needed to go to the ER as pt called [facility] this AM, and triage had informed pt to go to the ER r/t to hypertensive crisis. Pt did not go to the ER as advised. Pt shares someone is helping him with insurance issue but he did not know who it was helping. Referral sent to our ASW for help with insurance. Pt shares he does have a hard time understanding how to get insurance.   Call end time 1203   Would this patient benefit from a Referral to Amb Social Work? Yes   Reason for Social Work Referral Financial  [No insurance,  patient had appt with PCP today and cancelled r/t no insurance coverage. Pt does have hypertensive issues and having a hard time with BP control,  pt will need to fu with PCP when insured.]   Is the patient interested in additional calls from an ambulatory ? No            Shonda Son RN    2/4/2025, 12:18 EST

## 2025-02-04 NOTE — OUTREACH NOTE
Outgoing call to the patient to relay MD response and recommendations. Patient declined to go to ED due to difficulties with insurance at this time. I provided him with the number to [employer] to see if they can assist him with getting the correct information since we were not able to verify state aid for him in office today. I stressed the importance to seek treatment for his high BP and advised referral to  was placed to help assist patient as well. He stated he will attempt to contact [employer] to get insurance information and call us back.     Thanks  Priti

## 2025-02-04 NOTE — TELEPHONE ENCOUNTER
Caller: Flako Coreas Sr.    Relationship to patient: Self    Best call back number: [phone]    New or established patient?  [] New  [x] Established    Date of discharge: 2/2/25    Facility discharged from: [facility]    Diagnosis/Symptoms: SYNCOPE, HX OF CVA    Length of stay (If applicable): 8 DAYS    Specialty Only: Did you see a [facility] health provider?    [x] Yes  [] No  If so, who? DR LOZANO    Additional Details: PATIENT STATES HE HAS [employer] P[employer] FOR INSURANCE. UNABLE TO PULL IN SYSTEM. STATES HE WAS ADVISED HIS INSURANCE ONLY COVERS TELEHEALTH & HE WAS ADVISED TO SCHEDULE WITH JOHN GRANT. PLEASE REVIEW, THANK YOU.

## 2025-02-04 NOTE — TELEPHONE ENCOUNTER
Was released from [facility] yesterday , BP still high running 195/125, 199/126, checking with automatic cuff, has appt tomorrow with Dr. Rodriguez, BP is high, triaged per protocol, instructed to go to ER to be evaluated, Patient states had bad experience there and just got DC and does not want to go back, states he is in bath tub and going to see if goes down, encouraged to go to a ER to get evaluated

## 2025-02-04 NOTE — TELEPHONE ENCOUNTER
Patient called says he spoke with someone at insurance that told him he could be seen by doctor.     I called insurance again at number confirmed by patient, [phone]. Spoke with Rosalba who confirmed ID #[id number] covers telehealth and therapy. No regular doctor visits. She confirmed they had notes from my previous call and that they have notes that patient called and was advised the same info.     I called patient back to inform him. Patient says someone coming to help him tomorrow to possibly change/upgrade coverage. He will reach out if anything changes.

## 2025-02-04 NOTE — TELEPHONE ENCOUNTER
Reason for Disposition   [1] Systolic BP  >= 160 OR Diastolic >= 100 AND [2] cardiac (e.g., breathing difficulty, chest pain) or neurologic symptoms (e.g., new-onset blurred or double vision, unsteady gait)    Additional Information   Negative: Difficult to awaken or acting confused (e.g., disoriented, slurred speech)   Negative: SEVERE difficulty breathing (e.g., struggling for each breath, speaks in single words)   Negative: [1] Weakness of the face, arm or leg on one side of the body AND [2] new-onset   Negative: [1] Numbness (i.e., loss of sensation) of the face, arm or leg on one side of the body AND [2] new-onset   Negative: [1] Chest pain lasts > 5 minutes AND [2] history of heart disease (i.e., heart attack, bypass surgery, angina, angioplasty, CHF)   Negative: [1] Chest pain AND [2] took nitrogylcerin AND [3] pain was not relieved   Negative: Sounds like a life-threatening emergency to the triager   Negative: Symptom is main concern (e.g., headache, chest pain)   Negative: Low blood pressure is main concern   Negative: [1] Pregnant 20 or more weeks (or postpartum < 6 weeks) AND [2] new hand or face swelling   Negative: [1] Pregnant 20 or more weeks (or postpartum < 6 weeks) AND [2] Systolic BP >= 160 OR Diastolic >= 110   Negative: [1] Systolic BP  >= 200 OR Diastolic >= 120 AND [2] having NO cardiac or neurologic symptoms   Negative: [1] Pregnant 20 or more weeks (or postpartum < 6 weeks) AND [2] Systolic BP  >= 140 OR Diastolic >= 90   Negative: [1] Systolic BP  >= 180 OR Diastolic >= 110 AND [2] missed most recent dose of blood pressure medication   Negative: Systolic BP  >= 180 OR Diastolic >= 110   Negative: Ran out of BP medications   Negative: Systolic BP  >= 160 OR Diastolic >= 100   Negative: [1] Taking BP medications AND [2] feels is having side effects (e.g., impotence, cough, dizzy upon standing)   Negative: [1] Systolic BP  >= 130 OR Diastolic >= 80 AND [2] pregnant   Negative: [1] Systolic BP   ">= 130 OR Diastolic >= 80 AND [2] taking BP medications   Negative: [1] Systolic BP  >= 130 OR Diastolic >= 80 AND [2] not taking BP medications   Negative: [1] Systolic BP  >= 130 OR Diastolic >= 80 AND [2] history of heart problems, kidney disease or diabetes   Negative: Wants doctor to measure BP   Negative: [1] Systolic BP  < 130 with Diastolic < 80 AND [2] taking BP medications   Negative: Systolic BP between 120-129 with Diastolic < 80   Negative: Systolic BP  < 120 with Diastolic < 80   Negative: Healthy diet, questions about   Negative: Low salt diet, questions about    Answer Assessment - Initial Assessment Questions  1. BLOOD PRESSURE: \"What is the blood pressure?\" \"Did you take at least two measurements 5 minutes apart?\"      195/125, 199/126  2. ONSET: \"When did you take your blood pressure?\"      Just now  3. HOW: \"How did you take your blood pressure?\" (e.g., automatic home BP monitor, visiting nurse)      Automatic cuff  4. HISTORY: \"Do you have a history of high blood pressure?\"      yes  5. MEDICINES: \"Are you taking any medicines for blood pressure?\" \"Have you missed any doses recently?\"      Yes several, have not missed any doses  6. OTHER SYMPTOMS: \"Do you have any symptoms?\" (e.g., blurred vision, chest pain, difficulty breathing, headache, weakness)      Blurred vision, HA  7. PREGNANCY: \"Is there any chance you are pregnant?\" \"When was your last menstrual period?\"      NA    Protocols used: Blood Pressure - High-ADULT-AH    "

## 2025-02-04 NOTE — TELEPHONE ENCOUNTER
Patient in office today for scheduled appointment. Presented new insurance info. Did not have card, had an email.     Insurance listed as [employer], ID #[id number]. No phone number for actual insurance. Attempted through [employer] unable to verify. [employer] for phone number [phone]. Called there and spoke with William who was unable to locate coverage by ID# or name/ search.     ([email or web address].RACHAEL checked Availity and one other resource as well, nothing found)     Email had an  contact [phone]. Called there, spoke with Elle- said unable to see any benefits and transferred to different department. Spoke with Desiree cedeno, says shows active policy but patient has virtual doctor care only, they will not cover in person visits.     Relayed this info to patient. Offered to process today's visit as self pay. Patient declined. Says will have to check into insurance policy. Cancelled today's visit.

## 2025-02-07 ENCOUNTER — PATIENT OUTREACH (OUTPATIENT)
Age: 60
End: 2025-02-07

## 2025-02-07 NOTE — OUTREACH NOTE
MSW received referral from primary care providers office for assistance with community resources. MSW outreach to patient by phone and left voicemail and call back number. MSW will continue to attempt outreach to patient.    Karlene SIMPSON -   Ambulatory Case Management    2/7/2025, 13:55 EST

## 2025-02-10 ENCOUNTER — PATIENT OUTREACH (OUTPATIENT)
Age: 60
End: 2025-02-10

## 2025-02-10 NOTE — OUTREACH NOTE
Social Work Assessment  Questions/Answers      Flowsheet Row Most Recent Value   Referral Source physician, outpatient staff, outpatient clinic   Reason for Consult community resources, financial concerns, insurance concerns   Preferred Language English   Advance Care Planning Reviewed no concerns identified   People in Home alone   Current Living Arrangements home   Potentially Unsafe Housing Conditions none   In the past 12 months has the electric, gas, oil, or water company threatened to shut off services in your home? No   Primary Care Provided by self   Employment Status disabled   Source of Income disability   Financial/Environmental Concerns insurance inadequate   Application for Public Assistance applied   Medications independent   Meal Preparation independent   Housekeeping independent   Laundry independent   Shopping independent   If for any reason you need help with day-to-day activities such as bathing, preparing meals, shopping, managing finances, etc., do you get the help you need? I don't need any help   Major Change/Loss/Stressor medical condition/diagnosis   Sources of Support adult child(temo), community support   Do you want help finding or keeping work or a job? I do not need or want help   Do you want help with school or training? For example, starting or completing job training or getting a high school diploma, GED or equivalent No   Transportation Concerns none          SDOH updated and reviewed with the patient during this program:  --     Disabilities: At Risk (1/25/2025)    Disabilities     Concentrating, Remembering, or Making Decisions Difficulty: yes     Doing Errands Independently Difficulty: yes      --     Employment: Not At Risk (2/10/2025)    Employment     Do you want help finding or keeping work or a job?: I do not need or want help      Financial Resource Strain: Medium Risk (2/10/2025)    Overall Financial Resource Strain (CARDIA)     Difficulty of Paying Living Expenses:  Somewhat hard      --     Food Insecurity: No Food Insecurity (2/10/2025)    Hunger Vital Sign     Worried About Running Out of Food in the Last Year: Never true     Ran Out of Food in the Last Year: Never true      --     Health Literacy: Not At Risk (2/10/2025)    Education     Help with school or training?: No     Preferred Language: English      --     Housing Stability: Low Risk  (2/10/2025)    Housing Stability Vital Sign     Unable to Pay for Housing in the Last Year: No     Number of Times Moved in the Last Year: 1     Homeless in the Last Year: No      --     Stress: Stress Concern Present (2/10/2025)    [employer] - Occupational Stress Questionnaire     Feeling of Stress : To some extent      --     Transportation Needs: No Transportation Needs (2/10/2025)    PRAPARE - Transportation     Lack of Transportation (Medical): No     Lack of Transportation (Non-Medical): No      --     Utilities: Not At Risk (2/10/2025)    [facility] Utilities     Threatened with loss of utilities: No      Continuing Care   [facility]   DEPARTMENT FOR MEDICAID SERVICES    [address] [id number]    Phone: [phone]    Request Status: Declined    Services: Financial Assistance    Resource for: Financial Resource Strain, Utilities   [place] [employer] [facility]    [address] [facility] [address]    Phone: [phone]    Request Status: Accepted    Services: Disability Benefits    Resource for: Disabilities, Financial Resource Strain, Utilities     Patient Outreach    MSW outreach to patient as requested per primary care provider referral for assistance with community resource needs. Patient states that he is not eligible for Medicaid and has contact Cor[employer] to get enrolled in a new insurance plan. Patient discussed that he was able to work with [employer] to get enrolled with subsidy to lower the monthly price of insurance. Patient states that he was not aware that his  previous plan would not cover visits or stay in the hospital. MSW and patient discussed reviewing details of new plan once information has been received. Patient and MSW also discussed applying for [facility] Financial Assistance Program for assistance with the cost of his inpatient hospital stay. Patient states that he would like to receive the application to review and consider applying for assistance. MSW discussed that patient can also set up a plan with billing as needed. No additional needs by patient at this time and he will call back if additional assistance is needed.     Karlene SIMPSON -   Ambulatory Case Management    2/10/2025, 15:02 EST

## 2025-02-18 ENCOUNTER — TELEPHONE (OUTPATIENT)
Dept: SPORTS MEDICINE | Facility: CLINIC | Age: 60
End: 2025-02-18

## 2025-02-18 RX ORDER — LISINOPRIL 40 MG/1
40 TABLET ORAL DAILY
Qty: 30 TABLET | Refills: 0 | Status: ON HOLD | OUTPATIENT
Start: 2025-02-18 | End: 2025-03-20

## 2025-02-18 NOTE — TELEPHONE ENCOUNTER
Patient is requesting a prescription for     lisinopril (PRINIVIL,ZESTRIL) 40 MG tablet     Send to [facility] pharmacy at [address] [facility]. [address].    Please advise.

## 2025-02-22 ENCOUNTER — NURSE TRIAGE (OUTPATIENT)
Dept: CALL CENTER | Facility: HOSPITAL | Age: 60
End: 2025-02-22

## 2025-02-22 PROCEDURE — 36415 COLL VENOUS BLD VENIPUNCTURE: CPT

## 2025-02-22 PROCEDURE — 99284 EMERGENCY DEPT VISIT MOD MDM: CPT

## 2025-02-22 PROCEDURE — 96374 THER/PROPH/DIAG INJ IV PUSH: CPT

## 2025-02-23 ENCOUNTER — HOSPITAL ENCOUNTER (OUTPATIENT)
Facility: HOSPITAL | Age: 60
Setting detail: OBSERVATION
Discharge: HOME | End: 2025-02-27
Attending: EMERGENCY MEDICINE | Admitting: INTERNAL MEDICINE
Payer: COMMERCIAL

## 2025-02-23 ENCOUNTER — NURSE TRIAGE (OUTPATIENT)
Dept: CALL CENTER | Facility: HOSPITAL | Age: 60
End: 2025-02-23

## 2025-02-23 ENCOUNTER — HOSPITAL ENCOUNTER (EMERGENCY)
Facility: HOSPITAL | Age: 60
Discharge: HOME | End: 2025-02-23
Attending: EMERGENCY MEDICINE | Admitting: EMERGENCY MEDICINE
Payer: COMMERCIAL

## 2025-02-23 ENCOUNTER — APPOINTMENT (OUTPATIENT)
Dept: CT IMAGING | Facility: HOSPITAL | Age: 60
End: 2025-02-23
Payer: COMMERCIAL

## 2025-02-23 ENCOUNTER — APPOINTMENT (OUTPATIENT)
Dept: GENERAL RADIOLOGY | Facility: HOSPITAL | Age: 60
End: 2025-02-23
Payer: COMMERCIAL

## 2025-02-23 VITALS
RESPIRATION RATE: 16 BRPM | DIASTOLIC BLOOD PRESSURE: 108 MMHG | HEIGHT: 74 IN | HEART RATE: 65 BPM | TEMPERATURE: 97.2 F | WEIGHT: 225 LBS | SYSTOLIC BLOOD PRESSURE: 170 MMHG | BODY MASS INDEX: 28.88 KG/M2 | OXYGEN SATURATION: 97 %

## 2025-02-23 DIAGNOSIS — I16.0 HYPERTENSIVE URGENCY: Primary | ICD-10-CM

## 2025-02-23 DIAGNOSIS — I10 HYPERTENSION, UNSPECIFIED TYPE: Primary | ICD-10-CM

## 2025-02-23 DIAGNOSIS — R73.9 HYPERGLYCEMIA: ICD-10-CM

## 2025-02-23 DIAGNOSIS — Z86.73 HISTORY OF CVA (CEREBROVASCULAR ACCIDENT): ICD-10-CM

## 2025-02-23 DIAGNOSIS — H53.8 BLURRY VISION: ICD-10-CM

## 2025-02-23 DIAGNOSIS — I10 SEVERE UNCONTROLLED HYPERTENSION: ICD-10-CM

## 2025-02-23 PROBLEM — Z87.19 HISTORY OF DIVERTICULOSIS: Status: ACTIVE | Noted: 2025-02-23

## 2025-02-23 PROBLEM — K27.9 PEPTIC ULCER DISEASE: Status: ACTIVE | Noted: 2025-02-23

## 2025-02-23 PROBLEM — Z86.79 HISTORY OF VERTEBRAL ARTERY STENOSIS: Status: ACTIVE | Noted: 2023-10-25

## 2025-02-23 LAB
ALBUMIN SERPL-MCNC: 4.4 G/DL (ref 3.5–5.2)
ALBUMIN/GLOB SERPL: 1.5 G/DL
ALP SERPL-CCNC: 75 U/L (ref 39–117)
ALT SERPL W P-5'-P-CCNC: 22 U/L (ref 1–41)
ANION GAP SERPL CALCULATED.3IONS-SCNC: 10 MMOL/L (ref 5–15)
AST SERPL-CCNC: 17 U/L (ref 1–40)
BASOPHILS # BLD AUTO: 0.03 10*3/MM3 (ref 0–0.2)
BASOPHILS NFR BLD AUTO: 0.4 % (ref 0–1.5)
BILIRUB SERPL-MCNC: 0.4 MG/DL (ref 0–1.2)
BUN SERPL-MCNC: 15 MG/DL (ref 6–20)
BUN/CREAT SERPL: 18.1 (ref 7–25)
CALCIUM SPEC-SCNC: 9.3 MG/DL (ref 8.6–10.5)
CHLORIDE SERPL-SCNC: 107 MMOL/L (ref 98–107)
CO2 SERPL-SCNC: 21 MMOL/L (ref 22–29)
CREAT SERPL-MCNC: 0.83 MG/DL (ref 0.76–1.27)
DEPRECATED RDW RBC AUTO: 44.1 FL (ref 37–54)
EGFRCR SERPLBLD CKD-EPI 2021: 102.1 ML/MIN/1.73
EOSINOPHIL # BLD AUTO: 0.49 10*3/MM3 (ref 0–0.4)
EOSINOPHIL NFR BLD AUTO: 6.2 % (ref 0.3–6.2)
ERYTHROCYTE [DISTWIDTH] IN BLOOD BY AUTOMATED COUNT: 14.2 % (ref 12.3–15.4)
GEN 5 1HR TROPONIN T REFLEX: <6 NG/L
GLOBULIN UR ELPH-MCNC: 3 GM/DL
GLUCOSE SERPL-MCNC: 115 MG/DL (ref 65–99)
HBA1C MFR BLD: 5.5 % (ref 4.8–5.6)
HCT VFR BLD AUTO: 47.2 % (ref 37.5–51)
HGB BLD-MCNC: 15.7 G/DL (ref 13–17.7)
HOLD SPECIMEN: NORMAL
HOLD SPECIMEN: NORMAL
IMM GRANULOCYTES # BLD AUTO: 0.01 10*3/MM3 (ref 0–0.05)
IMM GRANULOCYTES NFR BLD AUTO: 0.1 % (ref 0–0.5)
LYMPHOCYTES # BLD AUTO: 2.03 10*3/MM3 (ref 0.7–3.1)
LYMPHOCYTES NFR BLD AUTO: 25.5 % (ref 19.6–45.3)
MCH RBC QN AUTO: 28.4 PG (ref 26.6–33)
MCHC RBC AUTO-ENTMCNC: 33.3 G/DL (ref 31.5–35.7)
MCV RBC AUTO: 85.4 FL (ref 79–97)
MONOCYTES # BLD AUTO: 0.57 10*3/MM3 (ref 0.1–0.9)
MONOCYTES NFR BLD AUTO: 7.2 % (ref 5–12)
NEUTROPHILS NFR BLD AUTO: 4.82 10*3/MM3 (ref 1.7–7)
NEUTROPHILS NFR BLD AUTO: 60.6 % (ref 42.7–76)
NRBC BLD AUTO-RTO: 0 /100 WBC (ref 0–0.2)
PLATELET # BLD AUTO: 236 10*3/MM3 (ref 140–450)
PMV BLD AUTO: 9.7 FL (ref 6–12)
POTASSIUM SERPL-SCNC: 4.1 MMOL/L (ref 3.5–5.2)
PROT SERPL-MCNC: 7.4 G/DL (ref 6–8.5)
QT INTERVAL: 388 MS
QTC INTERVAL: 414 MS
RBC # BLD AUTO: 5.53 10*6/MM3 (ref 4.14–5.8)
SODIUM SERPL-SCNC: 138 MMOL/L (ref 136–145)
TROPONIN T NUMERIC DELTA: NORMAL
TROPONIN T SERPL HS-MCNC: <6 NG/L
TROPONIN T SERPL HS-MCNC: <6 NG/L
TSH SERPL DL<=0.05 MIU/L-ACNC: 0.78 UIU/ML (ref 0.27–4.2)
WBC NRBC COR # BLD AUTO: 7.95 10*3/MM3 (ref 3.4–10.8)
WHOLE BLOOD HOLD COAG: NORMAL

## 2025-02-23 PROCEDURE — 36415 COLL VENOUS BLD VENIPUNCTURE: CPT

## 2025-02-23 PROCEDURE — 84443 ASSAY THYROID STIM HORMONE: CPT | Performed by: INTERNAL MEDICINE

## 2025-02-23 PROCEDURE — 25010000002 NICARDIPINE HCL IN NACL 20-0.9 MG/200ML-% SOLUTION: Performed by: INTERNAL MEDICINE

## 2025-02-23 PROCEDURE — 99285 EMERGENCY DEPT VISIT HI MDM: CPT

## 2025-02-23 PROCEDURE — 96374 THER/PROPH/DIAG INJ IV PUSH: CPT

## 2025-02-23 PROCEDURE — G0378 HOSPITAL OBSERVATION PER HR: HCPCS

## 2025-02-23 PROCEDURE — 84484 ASSAY OF TROPONIN QUANT: CPT | Performed by: EMERGENCY MEDICINE

## 2025-02-23 PROCEDURE — 70450 CT HEAD/BRAIN W/O DYE: CPT

## 2025-02-23 PROCEDURE — 96365 THER/PROPH/DIAG IV INF INIT: CPT

## 2025-02-23 PROCEDURE — 85025 COMPLETE CBC W/AUTO DIFF WBC: CPT | Performed by: EMERGENCY MEDICINE

## 2025-02-23 PROCEDURE — 25010000002 LABETALOL 5 MG/ML SOLUTION: Performed by: EMERGENCY MEDICINE

## 2025-02-23 PROCEDURE — 71045 X-RAY EXAM CHEST 1 VIEW: CPT

## 2025-02-23 PROCEDURE — 83036 HEMOGLOBIN GLYCOSYLATED A1C: CPT | Performed by: INTERNAL MEDICINE

## 2025-02-23 PROCEDURE — 96375 TX/PRO/DX INJ NEW DRUG ADDON: CPT

## 2025-02-23 PROCEDURE — 93005 ELECTROCARDIOGRAM TRACING: CPT | Performed by: EMERGENCY MEDICINE

## 2025-02-23 PROCEDURE — 80053 COMPREHEN METABOLIC PANEL: CPT | Performed by: EMERGENCY MEDICINE

## 2025-02-23 PROCEDURE — 93010 ELECTROCARDIOGRAM REPORT: CPT | Performed by: INTERNAL MEDICINE

## 2025-02-23 PROCEDURE — 25010000002 HYDRALAZINE PER 20 MG: Performed by: INTERNAL MEDICINE

## 2025-02-23 PROCEDURE — 96366 THER/PROPH/DIAG IV INF ADDON: CPT

## 2025-02-23 RX ORDER — LISINOPRIL 20 MG/1
40 TABLET ORAL DAILY
Status: DISCONTINUED | OUTPATIENT
Start: 2025-02-23 | End: 2025-02-27 | Stop reason: HOSPADM

## 2025-02-23 RX ORDER — ONDANSETRON 4 MG/1
4 TABLET, ORALLY DISINTEGRATING ORAL EVERY 6 HOURS PRN
Status: DISCONTINUED | OUTPATIENT
Start: 2025-02-23 | End: 2025-02-27 | Stop reason: HOSPADM

## 2025-02-23 RX ORDER — FOLIC ACID 1 MG/1
1 TABLET ORAL DAILY
Status: DISCONTINUED | OUTPATIENT
Start: 2025-02-23 | End: 2025-02-27 | Stop reason: HOSPADM

## 2025-02-23 RX ORDER — BISACODYL 10 MG
10 SUPPOSITORY, RECTAL RECTAL DAILY PRN
Status: DISCONTINUED | OUTPATIENT
Start: 2025-02-23 | End: 2025-02-27 | Stop reason: HOSPADM

## 2025-02-23 RX ORDER — SODIUM CHLORIDE 9 MG/ML
40 INJECTION, SOLUTION INTRAVENOUS AS NEEDED
Status: DISCONTINUED | OUTPATIENT
Start: 2025-02-23 | End: 2025-02-27 | Stop reason: HOSPADM

## 2025-02-23 RX ORDER — ATORVASTATIN CALCIUM 20 MG/1
40 TABLET, FILM COATED ORAL NIGHTLY
Status: DISCONTINUED | OUTPATIENT
Start: 2025-02-23 | End: 2025-02-27 | Stop reason: HOSPADM

## 2025-02-23 RX ORDER — POLYETHYLENE GLYCOL 3350 17 G/17G
17 POWDER, FOR SOLUTION ORAL DAILY PRN
Status: DISCONTINUED | OUTPATIENT
Start: 2025-02-23 | End: 2025-02-27 | Stop reason: HOSPADM

## 2025-02-23 RX ORDER — LORAZEPAM 2 MG/ML
2 INJECTION INTRAMUSCULAR
Status: DISCONTINUED | OUTPATIENT
Start: 2025-02-23 | End: 2025-02-23

## 2025-02-23 RX ORDER — CARVEDILOL 12.5 MG/1
12.5 TABLET ORAL 2 TIMES DAILY WITH MEALS
Status: DISCONTINUED | OUTPATIENT
Start: 2025-02-23 | End: 2025-02-27 | Stop reason: HOSPADM

## 2025-02-23 RX ORDER — SODIUM CHLORIDE 0.9 % (FLUSH) 0.9 %
10 SYRINGE (ML) INJECTION AS NEEDED
Status: DISCONTINUED | OUTPATIENT
Start: 2025-02-23 | End: 2025-02-27 | Stop reason: HOSPADM

## 2025-02-23 RX ORDER — NITROGLYCERIN 0.4 MG/1
0.4 TABLET SUBLINGUAL
Status: DISCONTINUED | OUTPATIENT
Start: 2025-02-23 | End: 2025-02-27 | Stop reason: HOSPADM

## 2025-02-23 RX ORDER — ASPIRIN 81 MG/1
81 TABLET ORAL DAILY
Status: DISCONTINUED | OUTPATIENT
Start: 2025-02-23 | End: 2025-02-27 | Stop reason: HOSPADM

## 2025-02-23 RX ORDER — AMLODIPINE BESYLATE 5 MG/1
5 TABLET ORAL ONCE
Status: COMPLETED | OUTPATIENT
Start: 2025-02-23 | End: 2025-02-23

## 2025-02-23 RX ORDER — CARVEDILOL 25 MG/1
25 TABLET ORAL 2 TIMES DAILY WITH MEALS
Status: DISCONTINUED | OUTPATIENT
Start: 2025-02-23 | End: 2025-02-23

## 2025-02-23 RX ORDER — LIDOCAINE 4 G/G
2 PATCH TOPICAL
Status: DISCONTINUED | OUTPATIENT
Start: 2025-02-23 | End: 2025-02-27 | Stop reason: HOSPADM

## 2025-02-23 RX ORDER — LORAZEPAM 1 MG/1
1 TABLET ORAL
Status: DISCONTINUED | OUTPATIENT
Start: 2025-02-23 | End: 2025-02-23

## 2025-02-23 RX ORDER — ONDANSETRON 2 MG/ML
4 INJECTION INTRAMUSCULAR; INTRAVENOUS EVERY 6 HOURS PRN
Status: DISCONTINUED | OUTPATIENT
Start: 2025-02-23 | End: 2025-02-27 | Stop reason: HOSPADM

## 2025-02-23 RX ORDER — ACETAMINOPHEN 650 MG/1
650 SUPPOSITORY RECTAL EVERY 4 HOURS PRN
Status: DISCONTINUED | OUTPATIENT
Start: 2025-02-23 | End: 2025-02-27 | Stop reason: HOSPADM

## 2025-02-23 RX ORDER — LABETALOL HYDROCHLORIDE 5 MG/ML
20 INJECTION, SOLUTION INTRAVENOUS ONCE
Status: COMPLETED | OUTPATIENT
Start: 2025-02-23 | End: 2025-02-23

## 2025-02-23 RX ORDER — OXYCODONE AND ACETAMINOPHEN 5; 325 MG/1; MG/1
1 TABLET ORAL EVERY 4 HOURS PRN
Status: DISCONTINUED | OUTPATIENT
Start: 2025-02-23 | End: 2025-02-23

## 2025-02-23 RX ORDER — HYDRALAZINE HYDROCHLORIDE 20 MG/ML
20 INJECTION INTRAMUSCULAR; INTRAVENOUS ONCE
Status: COMPLETED | OUTPATIENT
Start: 2025-02-23 | End: 2025-02-23

## 2025-02-23 RX ORDER — ACETAMINOPHEN 325 MG/1
650 TABLET ORAL EVERY 4 HOURS PRN
Status: DISCONTINUED | OUTPATIENT
Start: 2025-02-23 | End: 2025-02-27 | Stop reason: HOSPADM

## 2025-02-23 RX ORDER — AMOXICILLIN 250 MG
2 CAPSULE ORAL 2 TIMES DAILY
Status: DISCONTINUED | OUTPATIENT
Start: 2025-02-23 | End: 2025-02-27 | Stop reason: HOSPADM

## 2025-02-23 RX ORDER — LORAZEPAM 1 MG/1
2 TABLET ORAL
Status: DISCONTINUED | OUTPATIENT
Start: 2025-02-23 | End: 2025-02-23

## 2025-02-23 RX ORDER — BUSPIRONE HYDROCHLORIDE 10 MG/1
10 TABLET ORAL EVERY 8 HOURS SCHEDULED
Status: DISCONTINUED | OUTPATIENT
Start: 2025-02-23 | End: 2025-02-27 | Stop reason: HOSPADM

## 2025-02-23 RX ORDER — ACETAMINOPHEN 160 MG/5ML
650 SOLUTION ORAL EVERY 4 HOURS PRN
Status: DISCONTINUED | OUTPATIENT
Start: 2025-02-23 | End: 2025-02-27 | Stop reason: HOSPADM

## 2025-02-23 RX ORDER — LORAZEPAM 0.5 MG/1
0.5 TABLET ORAL EVERY 8 HOURS PRN
Status: DISCONTINUED | OUTPATIENT
Start: 2025-02-23 | End: 2025-02-23

## 2025-02-23 RX ORDER — SODIUM CHLORIDE 0.9 % (FLUSH) 0.9 %
10 SYRINGE (ML) INJECTION EVERY 12 HOURS SCHEDULED
Status: DISCONTINUED | OUTPATIENT
Start: 2025-02-23 | End: 2025-02-27 | Stop reason: HOSPADM

## 2025-02-23 RX ORDER — DIAZEPAM 2 MG/1
2 TABLET ORAL EVERY 12 HOURS PRN
Status: DISCONTINUED | OUTPATIENT
Start: 2025-02-23 | End: 2025-02-23

## 2025-02-23 RX ORDER — BISACODYL 5 MG/1
5 TABLET, DELAYED RELEASE ORAL DAILY PRN
Status: DISCONTINUED | OUTPATIENT
Start: 2025-02-23 | End: 2025-02-27 | Stop reason: HOSPADM

## 2025-02-23 RX ORDER — LORAZEPAM 2 MG/ML
1 INJECTION INTRAMUSCULAR
Status: DISCONTINUED | OUTPATIENT
Start: 2025-02-23 | End: 2025-02-23

## 2025-02-23 RX ORDER — VILAZODONE HYDROCHLORIDE 40 MG/1
40 TABLET ORAL DAILY
Status: DISCONTINUED | OUTPATIENT
Start: 2025-02-23 | End: 2025-02-27 | Stop reason: HOSPADM

## 2025-02-23 RX ORDER — PANTOPRAZOLE SODIUM 40 MG/1
40 TABLET, DELAYED RELEASE ORAL
Status: DISCONTINUED | OUTPATIENT
Start: 2025-02-24 | End: 2025-02-27 | Stop reason: HOSPADM

## 2025-02-23 RX ORDER — MULTIPLE VITAMINS W/ MINERALS TAB 9MG-400MCG
1 TAB ORAL DAILY
Status: DISCONTINUED | OUTPATIENT
Start: 2025-02-23 | End: 2025-02-27 | Stop reason: HOSPADM

## 2025-02-23 RX ADMIN — CARVEDILOL 12.5 MG: 12.5 TABLET, FILM COATED ORAL at 17:26

## 2025-02-23 RX ADMIN — BUSPIRONE HYDROCHLORIDE 10 MG: 10 TABLET ORAL at 22:00

## 2025-02-23 RX ADMIN — NICARDIPINE HYDROCHLORIDE 5 MG/HR: 0.1 INJECTION INTRAVENOUS at 17:48

## 2025-02-23 RX ADMIN — Medication 1 TABLET: at 17:22

## 2025-02-23 RX ADMIN — Medication 20 MG: at 00:55

## 2025-02-23 RX ADMIN — ACETAMINOPHEN 650 MG: 325 TABLET, FILM COATED ORAL at 22:00

## 2025-02-23 RX ADMIN — BUSPIRONE HYDROCHLORIDE 10 MG: 10 TABLET ORAL at 17:22

## 2025-02-23 RX ADMIN — Medication 10 ML: at 20:22

## 2025-02-23 RX ADMIN — LABETALOL HYDROCHLORIDE 20 MG: 5 INJECTION, SOLUTION INTRAVENOUS at 09:52

## 2025-02-23 RX ADMIN — LIDOCAINE 2 PATCH: 4 PATCH TOPICAL at 21:36

## 2025-02-23 RX ADMIN — ASPIRIN 81 MG: 81 TABLET, DELAYED RELEASE ORAL at 17:22

## 2025-02-23 RX ADMIN — ACETAMINOPHEN 650 MG: 325 TABLET, FILM COATED ORAL at 17:24

## 2025-02-23 RX ADMIN — SENNOSIDES AND DOCUSATE SODIUM 2 TABLET: 50; 8.6 TABLET ORAL at 20:22

## 2025-02-23 RX ADMIN — Medication 5 MG: at 22:00

## 2025-02-23 RX ADMIN — HYDRALAZINE HYDROCHLORIDE 20 MG: 20 INJECTION INTRAMUSCULAR; INTRAVENOUS at 14:56

## 2025-02-23 RX ADMIN — AMLODIPINE BESYLATE 5 MG: 5 TABLET ORAL at 11:59

## 2025-02-23 RX ADMIN — VILAZODONE HYDROCHLORIDE 20 MG: 40 TABLET, FILM COATED ORAL at 17:52

## 2025-02-23 NOTE — TELEPHONE ENCOUNTER
"Reason for Disposition  • General information question, no triage required and triager able to answer question    Additional Information  • Negative: [1] Caller is not with the adult (patient) AND [2] reporting urgent symptoms  • Negative: Lab result questions  • Negative: Medication questions  • Negative: Caller can't be reached by phone  • Negative: Caller has already spoken to PCP or another triager  • Negative: RN needs further essential information from caller in order to complete triage  • Negative: Requesting regular office appointment  • Negative: [1] Caller requesting NON-URGENT health information AND [2] PCP's office is the best resource  • Negative: Health Information question, no triage required and triager able to answer question  • Negative: Question about upcoming scheduled test, no triage required and triager able to answer question  • Negative: [1] Caller is not with the adult (patient) AND [2] probable NON-URGENT symptoms  • Negative: [1] Follow-up call to recent contact AND [2] information only call, no triage required    Answer Assessment - Initial Assessment Questions  1. REASON FOR CALL or QUESTION: \"What is your reason for calling today?\" or \"How can I best help you?\" or \"What question do you have that I can help answer?\"      Pt called me from the ER Room he is checked in but states no one will help him, he states his bp is 197/135 right, having blurred vision, not feeling well, feeling his pulse now in his left hip he stated.  Pt states he doesn't know what to do.  I called the ER for him, spoke to Kim in ER, she is going to tell his nurse and check on this patient.  During this time, the patient hung up on me, attempted to call back without an answer.    Protocols used: Information Only Call - No Triage-ADULT-    "

## 2025-02-23 NOTE — TELEPHONE ENCOUNTER
"Reason for Disposition  • [1] Systolic BP  >= 160 OR Diastolic >= 100 AND [2] cardiac (e.g., breathing difficulty, chest pain) or neurologic symptoms (e.g., new-onset blurred or double vision, unsteady gait)    Additional Information  • Negative: Difficult to awaken or acting confused (e.g., disoriented, slurred speech)  • Negative: SEVERE difficulty breathing (e.g., struggling for each breath, speaks in single words)  • Negative: [1] Weakness of the face, arm or leg on one side of the body AND [2] new-onset  • Negative: [1] Numbness (i.e., loss of sensation) of the face, arm or leg on one side of the body AND [2] new-onset  • Negative: [1] Chest pain lasts > 5 minutes AND [2] history of heart disease (i.e., heart attack, bypass surgery, angina, angioplasty, CHF)  • Negative: [1] Chest pain AND [2] took nitrogylcerin AND [3] pain was not relieved  • Negative: Sounds like a life-threatening emergency to the triager  • Negative: Symptom is main concern (e.g., headache, chest pain)  • Negative: Low blood pressure is main concern    Answer Assessment - Initial Assessment Questions  1. BLOOD PRESSURE: \"What is the blood pressure?\" \"Did you take at least two measurements 5 minutes apart?\"      179/129 and 196/128  2. ONSET: \"When did you take your blood pressure?\"      Last day and half  3. HOW: \"How did you take your blood pressure?\" (e.g., automatic home BP monitor, visiting nurse)      Automatic cuff  4. HISTORY: \"Do you have a history of high blood pressure?\"      yes  5. MEDICINES: \"Are you taking any medicines for blood pressure?\" \"Have you missed any doses recently?\"      Taking meds and has missed none  6. OTHER SYMPTOMS: \"Do you have any symptoms?\" (e.g., blurred vision, chest pain, difficulty breathing, headache, weakness)      Anxiety denies but did fall yesterday and earlier in the week from dizziness  7. PREGNANCY: \"Is there any chance you are pregnant?\" \"When was your last menstrual period?\"      " na    Protocols used: Blood Pressure - High-ADULT-AH

## 2025-02-23 NOTE — TELEPHONE ENCOUNTER
"Caller is in the ED at [facility] for the second time since last night due to HTN.  He was treated with IV Labetalol and sent home.  His BP went back up to 240/130 and he called the triage line again and was told to return to the ED.  He has a HX of CVA.  He has been there since 0630 and has only seen a nurse.  He called the nurse prior to calling the triage line this am and was told the MD was waiting on test results.  He told her no tests have been done since he returned.  Current BP is 175/123.  Asking for this to be documented and asking if there is anything he can do.  Recommended checking with the nurse again.  THEA Pizarro  at [facility] contacted and informed of situation, she is going to the ED to speak with the patient and ascertain if there is a problem.  Patient very polite,  Epic notes reviewed and patient was cooperative last night.  No notes entered for this am.  Reason for Disposition  • General information question, no triage required and triager able to answer question    Additional Information  • Negative: [1] Caller is not with the adult (patient) AND [2] reporting urgent symptoms  • Negative: Lab result questions  • Negative: Medication questions  • Negative: Caller can't be reached by phone  • Negative: Caller has already spoken to PCP or another triager  • Negative: RN needs further essential information from caller in order to complete triage  • Negative: Requesting regular office appointment  • Negative: [1] Caller requesting NON-URGENT health information AND [2] PCP's office is the best resource  • Negative: Health Information question, no triage required and triager able to answer question    Answer Assessment - Initial Assessment Questions  1. REASON FOR CALL or QUESTION: \"What is your reason for calling today?\" or \"How can I best help you?\" or \"What question do you have that I can help answer?\"      I am in the ED for the second time in 12  hours and have not seen anyone but a nurse and they " have done nothing.  I have been here 2 hours.    Protocols used: Information Only Call - No Triage-ADULT-AH

## 2025-02-23 NOTE — ED NOTES
Nursing report ED to floor  Flako Coreas Sr.  59 y.o.  male    HPI :  HPI  Stated Reason for Visit: PT PRESENTS TO THE ER FROM HOME VIA EMS WITH C/O CHRONIC HTN, PT WAS JUST SEEN AND DC IN THE ER EARLIER TONIGHT.  PT BP IS ELEVATED AGAIN.  NO C/O CHEST PAIN OR HA AT THIS TIME.  History Obtained From: patient, EMS  Duration (Hours): 4    Chief Complaint  Chief Complaint   Patient presents with    Hypertension       Admitting doctor:   Ester Bright MD    Admitting diagnosis:   The primary encounter diagnosis was Hypertensive urgency. Diagnoses of Severe uncontrolled hypertension, Blurry vision, and History of CVA (cerebrovascular accident) were also pertinent to this visit.    Code status:   Current Code Status       Date Active Code Status Order ID Comments User Context       Prior            Allergies:   Patient has no known allergies.    Isolation:   No active isolations    Intake and Output  No intake or output data in the 24 hours ending 02/23/25 1332    Weight:       02/23/25  0629   Weight: 102 kg (224 lb 13.9 oz)       Most recent vitals:   Vitals:    02/23/25 0832 02/23/25 0952 02/23/25 1031 02/23/25 1129   BP: (!) 171/123 (!) 197/135 (!) 185/115 (!) 188/129   BP Location:       Patient Position:       Pulse: 64 67 60 61   Resp:       Temp:       TempSrc:       SpO2: 95%  94% 94%   Weight:       Height:           Active LDAs/IV Access:   Lines, Drains & Airways       Active LDAs       Name Placement date Placement time Site Days    Peripheral IV 02/23/25 0950 Left Antecubital 02/23/25  0950  Antecubital  less than 1                    Labs (abnormal labs have a star):   Labs Reviewed   TROPONIN - Normal    Narrative:     High Sensitive Troponin T Reference Range:  <14.0 ng/L- Negative Female for AMI  <22.0 ng/L- Negative Male for AMI  >=14 - Abnormal Female indicating possible myocardial injury.  >=22 - Abnormal Male indicating possible myocardial injury.   Clinicians would have to utilize clinical  acumen, EKG, Troponin, and serial changes to determine if it is an Acute Myocardial Infarction or myocardial injury due to an underlying chronic condition.        HIGH SENSITIVITIY TROPONIN T 1HR    Narrative:     High Sensitive Troponin T Reference Range:  <14.0 ng/L- Negative Female for AMI  <22.0 ng/L- Negative Male for AMI  >=14 - Abnormal Female indicating possible myocardial injury.  >=22 - Abnormal Male indicating possible myocardial injury.   Clinicians would have to utilize clinical acumen, EKG, Troponin, and serial changes to determine if it is an Acute Myocardial Infarction or myocardial injury due to an underlying chronic condition.            EKG:   No orders to display       Meds given in ED:   Medications   labetalol (NORMODYNE,TRANDATE) injection 20 mg (20 mg Intravenous Given 2/23/25 5158)   amLODIPine (NORVASC) tablet 5 mg (5 mg Oral Given 2/23/25 1150)       Imaging results:  CT Head Without Contrast    Result Date: 2/23/2025  No acute process.  Radiation dose reduction techniques were utilized, including automated exposure control and exposure modulation based on body size.       XR Chest 1 View    Result Date: 2/23/2025   The heart size is within normal limits.  The lungs are normally aerated. There is no pleural effusion or pneumothorax.    This report was finalized on 2/23/2025 12:59 AM by Dr. Rik Ash M.D on Workstation: [id number]       Ambulatory status:   - ad chinyere    Social issues:   Social History     Socioeconomic History    Marital status: Single   Tobacco Use    Smoking status: Never    Smokeless tobacco: Never   Vaping Use    Vaping status: Never Used   Substance and Sexual Activity    Alcohol use: Not Currently     Comment: 12 beers on the weekend    Drug use: Not Currently     Frequency: 1.0 times per week     Types: Marijuana     Comment: patient states he has been smoking marijuania daily x 2 weeks    Sexual activity: Not Currently     Partners: Female     Birth  control/protection: Other, Birth control pill       Peripheral Neurovascular  Peripheral Neurovascular (Adult)  Peripheral Neurovascular WDL: .WDL except, neurovascular assessment upper  LUE Neurovascular Assessment  Sensation LUE: numbness present, tingling present (chronic since stroke last year)    Neuro Cognitive  Neuro Cognitive (Adult)  Cognitive/Neuro/Behavioral WDL: WDL  Zhanna Coma Scale  Best Eye Response: 4-->(E4) spontaneous  Best Motor Response: 6-->(M6) obeys commands  Best Verbal Response: 5-->(V5) oriented  Zhanna Coma Scale Score: 15    Learning  Learning Assessment  Learning Readiness and Ability: no barriers identified    Respiratory  Respiratory  Airway WDL: WDL  Respiratory WDL  Respiratory WDL: WDL    Abdominal Pain       Pain Assessments  Pain (Adult)  (0-10) Pain Rating: Rest: 0  (0-10) Pain Rating: Activity: 0    NIH Stroke Scale       Saúl King RN  02/23/25 13:32 EST

## 2025-02-23 NOTE — ED NOTES
"Pt states he has had \"80 mg of lisinopril, 1000 mg carvedilol, 20 mg amlodipine, 200 mg of hydralazine\" in the last 24 hours. States none of it has worked.  "

## 2025-02-23 NOTE — ED PROVIDER NOTES
EMERGENCY DEPARTMENT ENCOUNTER  Room Number:  22/22  Date of encounter:  2/23/2025  PCP: Akash Rodriguez Jr., DO  Patient Care Team:  Akash Rodriguez Jr., DO as PCP - General (Sports Medicine)  Karlene Leach MSW as  (Amb Case Mgmt) (Population Health)     HPI:  Context: Flako Coreas Sr. is a 59 y.o. male who presents to the ED c/o chief complaint of high blood pressure.  Patient reports that his blood pressure has been running elevated for over the last 2 weeks, has been persistently above the 180s systolic, has been over 200.  Patient denies any headache, does report they will have blurry vision with his blood pressure is elevated, improves when his blood pressure is lower.  Patient denies any chest pain shortness of breath, patient has been urinating normally.    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    Anxiety about health 02/26/2024    Vertebral artery stenosis, left 02/26/2024    Memory loss 02/26/2024    History of alcohol dependence 02/26/2024    Vasovagal syncope 03/15/2024    Elevated lipoprotein(a) 04/03/2024    Elevated coronary artery calcium score 04/03/2024    PAF (paroxysmal atrial fibrillation) 07/03/2024    Syncope 01/25/2025    History of atrial fibrillation 01/25/2025    Alcohol use 01/25/2025    Mood disorder 01/25/2025    Alcohol withdrawal 01/27/2025      Resolved Ambulatory Problems     Diagnosis Date Noted    Essential hypertension 06/26/2017    Abdominal pain 06/20/2018    Generalized abdominal pain 12/08/2022    History of esophagitis 12/08/2022    Gastrointestinal hemorrhage 12/08/2022    Acute CVA (cerebrovascular accident) 08/17/2023    Hypertensive emergency 08/19/2023    Ataxia 08/19/2023    CVA (cerebral vascular accident) 08/22/2023    Acute CVA (cerebrovascular accident) 10/23/2023    TIA (transient ischemic attack) 10/26/2023    Concussion 10/27/2023    Fall 10/27/2023    Head injury, closed, with concussion 10/27/2023    CHI (closed head injury), initial encounter 12/10/2023    Recurrent syncope 01/19/2024    Closed head injury 02/12/2024    Hospital discharge follow-up 02/26/2024    Dizziness 02/26/2024    ALLISON (acute kidney injury) 03/15/2024    Hypertensive emergency 01/25/2025     Past Medical History:   Diagnosis Date    ADHD (attention deficit hyperactivity disorder)     Anxiety     Benign prostatic hyperplasia Surgery in 12/2021    Brain concussion 11/2023    Clotting disorder Intestinal    Depression     Diverticulitis     Duodenitis     Enteritis     Failure to thrive (child)     Family history of blood clots     Fracture of wrist 1985    Fracture, foot 2019    GERD (gastroesophageal reflux disease)     Hypertension     Irritable bowel syndrome 2017    Low testosterone     Microcytosis     Pancreatitis     Pneumonia     PONV (postoperative nausea and vomiting)     Seasonal affective disorder     Shoulder injury     Stroke     Unexplained weight loss     Visual impairment 8/2023       PAST SURGICAL HISTORY  Past Surgical History:   Procedure Laterality Date    CARDIAC ELECTROPHYSIOLOGY PROCEDURE N/A 01/24/2024    Procedure: Loop insertion- [employer] LINQ;  Surgeon: Kaela Walker MD;  Location: [facility] INVASIVE LOCATION;  Service: Cardiovascular;  Laterality: N/A;    COLON SURGERY      COLONOSCOPY      COLONOSCOPY N/A 12/11/2022     Procedure: COLONOSCOPY INTO CECUM WITH HOT SNARE POLYPECTOMY;  Surgeon: Albert Gallo MD;  Location:  [facility];  Service: Gastroenterology;  Laterality: N/A;  PRE- GI BLEED  POST- DIVERTICULOSIS, POLYP    COLONOSCOPY N/A 02/14/2024    Procedure: COLONOSCOPY into cecum/terminal ileum;  Surgeon: Albert Gallo MD;  Location:  [facility];  Service: Gastroenterology;  Laterality: N/A;  diverticulosis, hemorrhoids    ENDOSCOPY N/A 12/10/2022    Procedure: ESOPHAGOGASTRODUODENOSCOPY;  Surgeon: Albert Gallo MD;  Location: [facility];  Service: Gastroenterology;  Laterality: N/A;  PRE- DARK STOOLS  POST- BARRETTS ESOPHAGUS    ENDOSCOPY N/A 02/14/2024    Procedure: ESOPHAGOGASTRODUODENOSCOPY with biopsy;  Surgeon: Albert Gallo MD;  Location: [facility] ENDOSCOPY;  Service: Gastroenterology;  Laterality: N/A;  long segment wilson's, hiatal hernia    FRACTURE SURGERY Right 1980    for broken arm    FRACTURE SURGERY Right     for broken hand    HAND SURGERY  1990    INCISION AND DRAINAGE ABSCESS  2015    anal    PROSTATE SURGERY      SHOULDER SURGERY  2022    SMALL INTESTINE SURGERY      TONSILLECTOMY      TRIGGER POINT INJECTION  2017       FAMILY HISTORY  Family History   Problem Relation Age of Onset    Stroke Mother     Heart disease Mother     Stroke Father     Hyperlipidemia Father     Hypertension Father        SOCIAL HISTORY  Social History     Socioeconomic History    Marital status: Single   Tobacco Use    Smoking status: Never    Smokeless tobacco: Never   Vaping Use    Vaping status: Never Used   Substance and Sexual Activity    Alcohol use: Not Currently     Comment: 12 beers on the weekend    Drug use: Not Currently     Frequency: 1.0 times per week     Types: Marijuana     Comment: patient states he has been smoking marijuania daily x 2 weeks    Sexual activity: Not Currently     Partners: Female     Birth control/protection: Other, Birth control pill       ALLERGIES  Patient has no  known allergies.    The patient's allergies have been reviewed    REVIEW OF SYSTEMS  All systems reviewed and negative except for those discussed in HPI.     PHYSICAL EXAM  I have reviewed the triage vital signs and nursing notes.  ED Triage Vitals [02/23/25 0001]   Temp Heart Rate Resp BP SpO2   97.2 °F (36.2 °C) 72 16 (!) 202/158 96 %      Temp src Heart Rate Source Patient Position BP Location FiO2 (%)   Tympanic Monitor Sitting Right arm --       General: No acute distress.  HENT: NCAT, PERRL, Nares patent.  Eyes: no scleral icterus.  Neck: trachea midline, no ROM limitations.  CV: regular rhythm, regular rate.  Respiratory: normal effort, CTAB.  Abdomen: soft, nondistended, NTTP, no rebound tenderness, no guarding or rigidity.  Musculoskeletal: no deformity.  Neuro: Alert and oriented, no facial droop, speech clear, no dysarthria or aphasia, moves all extremities well, sensation intact light touch all extremities, no focal deficits  Skin: warm, dry.    LAB RESULTS  Recent Results (from the past 24 hours)   ECG 12 Lead Other; HTN    Collection Time: 02/23/25 12:52 AM   Result Value Ref Range    QT Interval 388 ms    QTC Interval 414 ms   Comprehensive Metabolic Panel    Collection Time: 02/23/25 12:57 AM    Specimen: Blood   Result Value Ref Range    Glucose 115 (H) 65 - 99 mg/dL    BUN 15 6 - 20 mg/dL    Creatinine 0.83 0.76 - 1.27 mg/dL    Sodium 138 136 - 145 mmol/L    Potassium 4.1 3.5 - 5.2 mmol/L    Chloride 107 98 - 107 mmol/L    CO2 21.0 (L) 22.0 - 29.0 mmol/L    Calcium 9.3 8.6 - 10.5 mg/dL    Total Protein 7.4 6.0 - 8.5 g/dL    Albumin 4.4 3.5 - 5.2 g/dL    ALT (SGPT) 22 1 - 41 U/L    AST (SGOT) 17 1 - 40 U/L    Alkaline Phosphatase 75 39 - 117 U/L    Total Bilirubin 0.4 0.0 - 1.2 mg/dL    Globulin 3.0 gm/dL    A/G Ratio 1.5 g/dL    BUN/Creatinine Ratio 18.1 7.0 - 25.0    Anion Gap 10.0 5.0 - 15.0 mmol/L    eGFR 102.1 >60.0 mL/min/1.73   High Sensitivity Troponin T    Collection Time: 02/23/25 12:57 AM     Specimen: Blood   Result Value Ref Range    HS Troponin T <6 <22 ng/L   CBC Auto Differential    Collection Time: 02/23/25 12:57 AM    Specimen: Blood   Result Value Ref Range    WBC 7.95 3.40 - 10.80 10*3/mm3    RBC 5.53 4.14 - 5.80 10*6/mm3    Hemoglobin 15.7 13.0 - 17.7 g/dL    Hematocrit 47.2 37.5 - 51.0 %    MCV 85.4 79.0 - 97.0 fL    MCH 28.4 26.6 - 33.0 pg    MCHC 33.3 31.5 - 35.7 g/dL    RDW 14.2 12.3 - 15.4 %    RDW-SD 44.1 37.0 - 54.0 fl    MPV 9.7 6.0 - 12.0 fL    Platelets 236 140 - 450 10*3/mm3    Neutrophil % 60.6 42.7 - 76.0 %    Lymphocyte % 25.5 19.6 - 45.3 %    Monocyte % 7.2 5.0 - 12.0 %    Eosinophil % 6.2 0.3 - 6.2 %    Basophil % 0.4 0.0 - 1.5 %    Immature Grans % 0.1 0.0 - 0.5 %    Neutrophils, Absolute 4.82 1.70 - 7.00 10*3/mm3    Lymphocytes, Absolute 2.03 0.70 - 3.10 10*3/mm3    Monocytes, Absolute 0.57 0.10 - 0.90 10*3/mm3    Eosinophils, Absolute 0.49 (H) 0.00 - 0.40 10*3/mm3    Basophils, Absolute 0.03 0.00 - 0.20 10*3/mm3    Immature Grans, Absolute 0.01 0.00 - 0.05 10*3/mm3    nRBC 0.0 0.0 - 0.2 /100 WBC   Gold Top - SST    Collection Time: 02/23/25 12:57 AM   Result Value Ref Range    Extra Tube Hold for add-ons.    Gray Top    Collection Time: 02/23/25 12:57 AM   Result Value Ref Range    Extra Tube Hold for add-ons.    Light Blue Top    Collection Time: 02/23/25 12:57 AM   Result Value Ref Range    Extra Tube Hold for add-ons.        I ordered the above labs and reviewed the results.    RADIOLOGY  XR Chest 1 View    Result Date: 2/23/2025  CXR ONE VIEW  HISTORY: HTN  COMPARISON: 1/26/2025  TECHNIQUE: single portable AP       The heart size is within normal limits.  The lungs are normally aerated. There is no pleural effusion or pneumothorax.    This report was finalized on 2/23/2025 12:59 AM by Dr. Rik Ash M.D on Workstation: [id number]       I ordered the above noted radiological studies. I reviewed the images and results. I agree with the radiologist  interpretation.    PROCEDURES  Procedures    MEDICATIONS GIVEN IN ER  Medications   labetalol (NORMODYNE,TRANDATE) injection 20 mg (20 mg Intravenous Given 2/23/25 0055)       PROGRESS, DATA ANALYSIS, CONSULTS, AND MEDICAL DECISION MAKING  A complete history and physical exam have been performed.  All available laboratory and imaging results have been reviewed by myself prior to disposition.    MDM    After the initial H&P, I discussed pertinent information from history and physical exam with patient/family.  Discussed differential diagnosis.  Discussed plan for ED evaluation/workup/treatment.  All questions answered.  Patient/family is agreeable with plan.  ED Course as of 02/23/25 0153   Sun Feb 23, 2025   0055 EKG independently viewed and contemporaneously interpreted by ED physician. Time: 12:52 AM.  Rate 68.  Interpretation: Normal sinus rhythm, normal axis, normal QRS, no acute ST changes. [JG]   0152 Blood pressure improved with treatment.  Patient has no signs of endorgan damage.  Patient well-appearing appears appropriate for discharge with outpatient follow-up. [JG]      ED Course User Index  [JG] Enrique Flores MD       AS OF 01:53 EST VITALS:    BP - (!) 170/108  HR - 65  TEMP - 97.2 °F (36.2 °C) (Tympanic)  O2 SATS - 97%    DIAGNOSIS  Final diagnoses:   Hypertension, unspecified type   Hyperglycemia         DISPOSITION  DISCHARGE    Patient discharged in stable condition.    Reviewed implications of results, diagnosis, meds, responsibility to follow up, warning signs and symptoms of possible worsening, potential complications and reasons to return to ER.    Patient/Family voiced understanding of above instructions.    Discussed plan for discharge, as there is no emergent indication for admission. Patient referred to primary care provider for BP management due to today's BP. Pt/family is agreeable and understands need for follow up and repeat testing.  Pt is aware that discharge does not mean that  nothing is wrong but it indicates no emergency is present that requires admission and they must continue care with follow-up as given below or physician of their choice.     FOLLOW-UP  Akash Rodriguez Jr., DO  [address] [id number] [phone]    Schedule an appointment as soon as possible for a visit in 2 days  even if well, for further evaluation/management of your bloodpressure         Medication List      No changes were made to your prescriptions during this visit.            Enrique Flores MD  02/23/25 0153

## 2025-02-23 NOTE — ED PROVIDER NOTES
EMERGENCY DEPARTMENT ENCOUNTER    Room Number:  26/26  PCP: Akash Rodriguez Jr., DO  Historian: Patient      HPI:  Chief Complaint: Uncontrolled blood pressure, blurry vision  A complete HPI/ROS/PMH/PSH/SH/FH are unobtainable due to: None    Context: Flako Coreas Sr. is a 59 y.o. male who presents to the ED via [facility] from home c/o acute on chronic uncontrolled blood pressure, blurry vision.  Patient states that was seen here last night and discharged but blood pressure remains elevated.  Denies any chest pain or shortness of breath.  History of strokes in the past that he has some residual, tremor and difficulty with certain control body movements at times, states that this seems to be getting worse recently.  Reports occasional alcohol use, last drink was 2 weeks ago.  States that he does not drink regularly.       MEDICAL RECORD REVIEW    External (non-ED) record review: MRI brain with and without contrast January 23, 2025 showed mild nonspecific white matter changes with no convincing signs of interval change since February 12, 2024.              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    Anxiety about health 02/26/2024    Vertebral artery stenosis, left 02/26/2024    Memory loss 02/26/2024    History of alcohol dependence 02/26/2024    Vasovagal syncope 03/15/2024    Elevated lipoprotein(a) 04/03/2024    Elevated coronary  artery calcium score 04/03/2024    PAF (paroxysmal atrial fibrillation) 07/03/2024    Syncope 01/25/2025    History of atrial fibrillation 01/25/2025    Alcohol use 01/25/2025    Mood disorder 01/25/2025    Alcohol withdrawal 01/27/2025     Resolved Ambulatory Problems     Diagnosis Date Noted    Essential hypertension 06/26/2017    Abdominal pain 06/20/2018    Generalized abdominal pain 12/08/2022    History of esophagitis 12/08/2022    Gastrointestinal hemorrhage 12/08/2022    Acute CVA (cerebrovascular accident) 08/17/2023    Hypertensive emergency 08/19/2023    Ataxia 08/19/2023    CVA (cerebral vascular accident) 08/22/2023    Acute CVA (cerebrovascular accident) 10/23/2023    TIA (transient ischemic attack) 10/26/2023    Concussion 10/27/2023    Fall 10/27/2023    Head injury, closed, with concussion 10/27/2023    CHI (closed head injury), initial encounter 12/10/2023    Recurrent syncope 01/19/2024    Closed head injury 02/12/2024    Hospital discharge follow-up 02/26/2024    Dizziness 02/26/2024    ALLISON (acute kidney injury) 03/15/2024    Hypertensive emergency 01/25/2025     Past Medical History:   Diagnosis Date    ADHD (attention deficit hyperactivity disorder)     Anxiety     Benign prostatic hyperplasia Surgery in 12/2021    Brain concussion 11/2023    Clotting disorder Intestinal    Depression     Diverticulitis     Duodenitis     Enteritis     Failure to thrive (child)     Family history of blood clots     Fracture of wrist 1985    Fracture, foot 2019    GERD (gastroesophageal reflux disease)     Hypertension     Irritable bowel syndrome 2017    Low testosterone     Microcytosis     Pancreatitis     Pneumonia     PONV (postoperative nausea and vomiting)     Seasonal affective disorder     Shoulder injury     Stroke     Unexplained weight loss     Visual impairment 8/2023         PAST SURGICAL HISTORY  Past Surgical History:   Procedure Laterality Date    CARDIAC ELECTROPHYSIOLOGY PROCEDURE N/A  01/24/2024    Procedure: Loop insertion- [employer] LINQ;  Surgeon: Kaela Walker MD;  Location: [facility] CATH INVASIVE LOCATION;  Service: Cardiovascular;  Laterality: N/A;    COLON SURGERY      COLONOSCOPY      COLONOSCOPY N/A 12/11/2022    Procedure: COLONOSCOPY INTO CECUM WITH HOT SNARE POLYPECTOMY;  Surgeon: Albert Gallo MD;  Location: [facility] ENDOSCOPY;  Service: Gastroenterology;  Laterality: N/A;  PRE- GI BLEED  POST- DIVERTICULOSIS, POLYP    COLONOSCOPY N/A 02/14/2024    Procedure: COLONOSCOPY into cecum/terminal ileum;  Surgeon: Albert Gallo MD;  Location: [facility] END[facility];  Service: Gastroenterology;  Laterality: N/A;  diverticulosis, hemorrhoids    ENDOSCOPY N/A 12/10/2022    Procedure: ESOPHAGOGASTRODUODENOSCOPY;  Surgeon: Albert Gallo MD;  Location: [facility] ENDOSCOPY;  Service: Gastroenterology;  Laterality: N/A;  PRE- DARK STOOLS  POST- BARRETTS ESOPHAGUS    ENDOSCOPY N/A 02/14/2024    Procedure: ESOPHAGOGASTRODUODENOSCOPY with biopsy;  Surgeon: Albert Gallo MD;  Location: [facility] ENDOSCOPY;  Service: Gastroenterology;  Laterality: N/A;  long segment wilson's, hiatal hernia    FRACTURE SURGERY Right 1980    for broken arm    FRACTURE SURGERY Right     for broken hand    HAND SURGERY  1990    INCISION AND DRAINAGE ABSCESS  2015    anal    PROSTATE SURGERY      SHOULDER SURGERY  2022    SMALL INTESTINE SURGERY      TONSILLECTOMY      TRIGGER POINT INJECTION  2017         FAMILY HISTORY  Family History   Problem Relation Age of Onset    Stroke Mother     Heart disease Mother     Stroke Father     Hyperlipidemia Father     Hypertension Father          SOCIAL HISTORY  Social History     Socioeconomic History    Marital status: Single   Tobacco Use    Smoking status: Never    Smokeless tobacco: Never   Vaping Use    Vaping status: Never Used   Substance and Sexual Activity    Alcohol use: Not Currently     Comment: 12 beers on the weekend    Drug use: Not Currently     Frequency: 1.0 times per  week     Types: Marijuana     Comment: patient states he has been smoking marijuania daily x 2 weeks    Sexual activity: Not Currently     Partners: Female     Birth control/protection: Other, Birth control pill         ALLERGIES  Patient has no known allergies.        REVIEW OF SYSTEMS  Review of Systems     All systems reviewed and negative except for those discussed in HPI.       PHYSICAL EXAM    I have reviewed the triage vital signs and nursing notes.    ED Triage Vitals [02/23/25 0629]   Temp Heart Rate Resp BP SpO2   98.9 °F (37.2 °C) 75 18 (!) 190/135 97 %      Temp src Heart Rate Source Patient Position BP Location FiO2 (%)   Oral Monitor Lying Right arm --       Physical Exam  General: No acute distress, nontoxic  HEENT: Mucous membranes moist, atraumatic, EOMI  Neck: Full ROM  Pulm: Symmetric chest rise, nonlabored, lungs CTAB  Cardiovascular: Regular rate and rhythm, intact distal pulses  GI: Soft, nontender, nondistended, no rebound, no guarding, bowel sounds present  MSK: Full ROM, no deformity  Skin: Warm, dry  Neuro: Awake, alert, oriented x 4, mild generalized tremor, no facial droop, no significant dysarthria or aphasia, slight ataxia with the hands bilaterally, GCS 15, moving all extremities, no focal deficits  Psych: Calm, cooperative        LAB RESULTS  Recent Results (from the past 24 hours)   ECG 12 Lead Other; HTN    Collection Time: 02/23/25 12:52 AM   Result Value Ref Range    QT Interval 388 ms    QTC Interval 414 ms   Comprehensive Metabolic Panel    Collection Time: 02/23/25 12:57 AM    Specimen: Blood   Result Value Ref Range    Glucose 115 (H) 65 - 99 mg/dL    BUN 15 6 - 20 mg/dL    Creatinine 0.83 0.76 - 1.27 mg/dL    Sodium 138 136 - 145 mmol/L    Potassium 4.1 3.5 - 5.2 mmol/L    Chloride 107 98 - 107 mmol/L    CO2 21.0 (L) 22.0 - 29.0 mmol/L    Calcium 9.3 8.6 - 10.5 mg/dL    Total Protein 7.4 6.0 - 8.5 g/dL    Albumin 4.4 3.5 - 5.2 g/dL    ALT (SGPT) 22 1 - 41 U/L    AST (SGOT) 17  1 - 40 U/L    Alkaline Phosphatase 75 39 - 117 U/L    Total Bilirubin 0.4 0.0 - 1.2 mg/dL    Globulin 3.0 gm/dL    A/G Ratio 1.5 g/dL    BUN/Creatinine Ratio 18.1 7.0 - 25.0    Anion Gap 10.0 5.0 - 15.0 mmol/L    eGFR 102.1 >60.0 mL/min/1.73   High Sensitivity Troponin T    Collection Time: 02/23/25 12:57 AM    Specimen: Blood   Result Value Ref Range    HS Troponin T <6 <22 ng/L   CBC Auto Differential    Collection Time: 02/23/25 12:57 AM    Specimen: Blood   Result Value Ref Range    WBC 7.95 3.40 - 10.80 10*3/mm3    RBC 5.53 4.14 - 5.80 10*6/mm3    Hemoglobin 15.7 13.0 - 17.7 g/dL    Hematocrit 47.2 37.5 - 51.0 %    MCV 85.4 79.0 - 97.0 fL    MCH 28.4 26.6 - 33.0 pg    MCHC 33.3 31.5 - 35.7 g/dL    RDW 14.2 12.3 - 15.4 %    RDW-SD 44.1 37.0 - 54.0 fl    MPV 9.7 6.0 - 12.0 fL    Platelets 236 140 - 450 10*3/mm3    Neutrophil % 60.6 42.7 - 76.0 %    Lymphocyte % 25.5 19.6 - 45.3 %    Monocyte % 7.2 5.0 - 12.0 %    Eosinophil % 6.2 0.3 - 6.2 %    Basophil % 0.4 0.0 - 1.5 %    Immature Grans % 0.1 0.0 - 0.5 %    Neutrophils, Absolute 4.82 1.70 - 7.00 10*3/mm3    Lymphocytes, Absolute 2.03 0.70 - 3.10 10*3/mm3    Monocytes, Absolute 0.57 0.10 - 0.90 10*3/mm3    Eosinophils, Absolute 0.49 (H) 0.00 - 0.40 10*3/mm3    Basophils, Absolute 0.03 0.00 - 0.20 10*3/mm3    Immature Grans, Absolute 0.01 0.00 - 0.05 10*3/mm3    nRBC 0.0 0.0 - 0.2 /100 WBC   [facility]    Collection Time: 02/23/25 12:57 AM   Result Value Ref Range    Extra Tube Hold for add-ons.    Gray Top    Collection Time: 02/23/25 12:57 AM   Result Value Ref Range    Extra Tube Hold for add-ons.    Light Blue Top    Collection Time: 02/23/25 12:57 AM   Result Value Ref Range    Extra Tube Hold for add-ons.    High Sensitivity Troponin T    Collection Time: 02/23/25  9:52 AM    Specimen: Blood   Result Value Ref Range    HS Troponin T <6 <22 ng/L   High Sensitivity Troponin T 1Hr    Collection Time: 02/23/25 10:51 AM    Specimen: Blood   Result Value Ref  Range    HS Troponin T <6 <22 ng/L    Troponin T Numeric Delta         Ordered the above labs and independently interpreted results. My findings will be discussed in the medical decision making section below        RADIOLOGY  CT Head Without Contrast    Result Date: 2/23/2025  CT OF THE BRAIN WITHOUT CONTRAST 02/23/2025  HISTORY: Hypertension. Vision changes.  Axial images were obtained through the brain without intravenous contrast.  The brain parenchyma and ventricular system appear within normal limits. No mass lesions, midline shift, intracranial hemorrhage or evidence of infarction is demonstrated. There is some distal left vertebral artery calcification. Bony structures appear unremarkable.      No acute process.  Radiation dose reduction techniques were utilized, including automated exposure control and exposure modulation based on body size.       XR Chest 1 View    Result Date: 2/23/2025  CXR ONE VIEW  HISTORY: HTN  COMPARISON: 1/26/2025  TECHNIQUE: single portable AP       The heart size is within normal limits.  The lungs are normally aerated. There is no pleural effusion or pneumothorax.    This report was finalized on 2/23/2025 12:59 AM by Dr. Rik Ash M.D on Workstation: [id number]       Ordered the above noted radiological studies.  Independently interpreted by me and my independent review of findings can be found in the ED Course.  See dictation for official radiology interpretation.      PROCEDURES    Procedures        MEDICATIONS GIVEN IN ER    Medications   labetalol (NORMODYNE,TRANDATE) injection 20 mg (20 mg Intravenous Given 2/23/25 0425)   amLODIPine (NORVASC) tablet 5 mg (5 mg Oral Given 2/23/25 7389)         PROGRESS, DATA ANALYSIS, CONSULTS, AND MEDICAL DECISION MAKING    Please note that this section constitutes my independent interpretation of clinical data including lab results, radiology, EKG's.  This constitutes my independent professional opinion regarding differential  diagnosis and management of this patient.  It may include any factors such as history from outside sources, review of external records, social determinants of health, management of medications, response to those treatments, and discussions with other providers.    Differential Diagnosis and Plan: Plan for CT head, blood pressure control, and hospitalization for ongoing blood pressure management given blurry vision related to his blood pressure currently.  May need MRI brain and may need further optimization of his current blood pressure medication regimen.    Additional sources:  - Discussed/ obtained information from independent historians:       - (Social Determinants of Health): None     - Shared decision making:  Patient fully updated on and in agreement with the course and plan moving forward    ED Course as of 02/23/25 1230   Sun Feb 23, 2025   1203 CT Head Without Contrast  Radiology report reviewed, no evidence of any acute emergent findings [DC]   1205 HS Troponin T: <6 [DC]   1205 Plan for hospitalization for hypertensive urgency, was given IV labetalol here with transient improvement but still running quite high both systolic and diastolic.  Will give an additional 5 mg dose of amlodipine orally in addition to what he took this morning.   [DC]   1217 Discussed with Dr. rBight, [facility], discussed patient's clinical course and findings today, treatment modalities, and need for hospitalization. [DC]      ED Course User Index  [DC] Bladimir Bliss MD       Hospitalization Considered?: yes    Orders Placed During This Visit:  Orders Placed This Encounter   Procedures    CT Head Without Contrast    High Sensitivity Troponin T    High Sensitivity Troponin T 1Hr    [facility] (on-call MD unless specified) Details    Initiate Observation Status       Additional orders considered but not placed:      Independent interpretation of labs, radiology studies, and discussions with consultants: See ED Course        AS OF 12:30 EST  VITALS:    BP - (!) 188/129  HR - 61  TEMP - 98.9 °F (37.2 °C) (Oral)  02 SATS - 94%          DIAGNOSIS  Final diagnoses:   Hypertensive urgency   Severe uncontrolled hypertension   Blurry vision   History of CVA (cerebrovascular accident)         DISPOSITION  ED Disposition       ED Disposition   Decision to Admit    Condition   --    Comment   Level of Care: Telemetry [5]   Diagnosis: Hypertensive urgency [521860]   Admitting Physician: CARMEN DHILLON [[id number]]   Attending Physician: CARMEN DHILLON [[id number]]   Is patient appropriate for Inpatient Observation Unit?: No [0]                  Please note that portions of this document were completed with a voice recognition program.    Note Disclaimer: At [facility], we believe that sharing information builds trust and better relationships. You are receiving this note because you recently visited [facility]. It is possible you will see health information before a provider has talked with you about it. This kind of information can be easy to misunderstand. To help you fully understand what it means for your health, we urge you to discuss this note with your provider.                       Bladimir Bliss MD  02/23/25 6117

## 2025-02-23 NOTE — PLAN OF CARE
Goal Outcome Evaluation:  Plan of Care Reviewed With: patient        Progress: improving  Outcome Evaluation: Patient admitted for HTN urgency, IV hydralazine given upon arrival to unit and now currently on cardene gtt at 5mg/hr. Patient c/o left hip pain, states he recently fell against wall on left hip. PRN tylenol given. 24hr urine started at 1715. Plan for renal doppler in the morning, NPO after MN for this. PT consulted. Cardiology consulted. Patient with left arm numbess and tingling at baseline from hx stroke. Patient also has trouble finding his words since stroke. Patient anxious regarding BP. Last /79.                               Occupational Therapy Evaluation/Treatment      Ulysees Dragon Marcos    2/12/2023    Patient Active Problem List   Diagnosis   • Venous insufficiency (chronic) (peripheral)   • Essential (primary) hypertension   • Chronic pain syndrome   • Chronic kidney disease, stage 2 (mild)   • Mixed hyperlipidemia   • Obesity, unspecified   • Unilateral primary osteoarthritis, unspecified knee   • Sjogren's syndrome ([facility] )   • Unspecified atrial fibrillation (HCC)   • Uncomplicated opioid dependence ([email or web address] )   • Anxiety   • Memory impairment   • Acute blood loss anemia   • Prediabetes   • Seasonal allergies   • Xerosis of skin   • Venous stasis dermatitis of both lower extremities   • At risk for obstructive sleep apnea   • Lymphedema   • Fall   • Fracture of proximal end of left femur ([email or web address] )   • Acute pain due to trauma   • Constipation   • Ambulatory dysfunction   • Anemia   • Age-related osteoporosis with current pathological fracture   • Acute gastric ulcer with hemorrhage   • Chronic heart failure with preserved ejection fraction (HFpEF) ([facility])   • Parenchymal renal hypertension   • Hypokalemia   • Fracture of rib, single, closed   • Closed fracture of transverse process of lumbar vertebra ([facility])   • Compression fracture of T8 vertebra with routine healing   • Bilateral lower leg cellulitis   • Altered mental status, unspecified   • Watery diarrhea        02/12/23 4995   OT Last Visit   OT Visit Date 02/12/23   Note Type   Note type Evaluation  (and Treatment)   Pain Assessment   Pain Assessment Tool 0-10   Pain Score No Pain   Restrictions/Precautions   Weight Bearing Precautions Per Order No   Braces or Orthoses TLSO  (prior admission 12/30/2022 pt fitted for TLSO for compression fx, but did not have f/u with neurosurgery as recommended  Pt reports she does not need to wear brace)   Other Precautions Cognitive; Chair Alarm; Bed Alarm; Fall Risk   Home Living   Type of [address] [place] [address] Two level;Bed/bath upstairs;Stairs to enter with rails;1/2 bath on main level  (2-3 GLORIA)   Bathroom Shower/Tub Walk-in shower   Bathroom Toilet Raised   Bathroom Equipment Grab bars in shower; Shower chair;Commode   Bathroom Accessibility Accessible   Home Equipment Walker;Stair glide;Cane   Additional Comments Pt was DC to rehab from hospital 1/4/2022   Prior Function   Level of [place] Independent with ADLs; Independent with functional mobility; Needs assistance with IADLS   Lives With Spouse;Daughter  (Daughter currently staying with pt and pt spouse from [place])   Ayanna 70 Help From Family   IADLs Independent with meal prep; Family/Friend/Other provides medication management; Family/Friend/Other provides transportation   Falls in the last 6 months 0  (per pt; "at least 3" per chart review)   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 37 to Others Retired Worked in sales for a [employer]   [address] Enjoys watching TV   General   Family/Caregiver Present No   Additional General Comments Patient's  is also admitted to the hospital at this time secondary to a fall with sustained trauma  Of note, patient took too much of her oxycodone and ran out of her prescription     Subjective   Subjective "I'm perfectly competent" "I wasn't really paying attention to you"   ADL   Eating Assistance 7  Independent   Eating Deficit Beverage management  (in stance pt able to manage from bedside table)   Grooming Assistance 5  Supervision/Setup   UB Bathing Assistance Unable to assess   LB Bathing Assistance Unable to assess   UB Dressing Assistance 5  Supervision/Setup   LB Dressing Assistance 3  Moderate Assistance   LB Dressing Deficit Setup;Steadying;Verbal cueing;Supervision/safety; Increased time to complete; Thread RLE into underwear; Thread LLE into underwear;Pull up over hips  (sitting EOB, A to thread over toes "I usually can do that", able to pull to hips in stance with unilateral support on RW, A to pull to comfortable height)   Toileting Assistance  Unable to assess   Bed Mobility   Supine to Sit Unable to assess   Sit to Supine Unable to assess   Additional Comments Pt seated EOB upon arrival with PT, OOB to recliner at end of session  Bed mobility not assessed   Transfers   Sit to Stand 5  Supervision   Additional items Assist x 1; Increased time required;Verbal cues   Stand to Sit 5  Supervision   Additional items Assist x 1; Increased time required;Verbal cues   Functional Mobility   Functional Mobility 5  Supervision   Additional Comments Short household distance in room with RW and (S)  VC for RW management and navigation, increased time with turns     Additional items Rolling walker   Balance   Static Sitting Fair +   Dynamic Sitting Fair   Static Standing Fair   Dynamic Standing Fair -   Activity Tolerance   Activity Tolerance Patient limited by fatigue  (limited by cognittion/anxiety)   Medical Staff Made Aware Care coordinated with PT Aruna Gillette and PT Student Yessenia Velez   Nurse Made Aware yes, RN ok to see pt   RUE Assessment   RUE Assessment WFL   LUE Assessment   LUE Assessment WFL   Hand Function   Gross Motor Coordination Functional   Fine Motor Coordination Functional   Hand Function Comments Able to complete ACLS assessment and manipulate lace with speed and precision   Sensation   Light Touch No apparent deficits   Vision-Basic Assessment   Current Vision Wears glasses all the time   Cognition   Overall Cognitive Status Impaired   Arousal/Participation Alert; Cooperative   Attention Attends with cues to redirect   Orientation Level Oriented to person;Oriented to place; Disoriented to time;Disoriented to situation   Memory Decreased recall of precautions;Decreased recall of recent events;Decreased short term memory   Following Commands Follows one step commands with increased time or repetition   Comments Pleasantly confused, disractible, redirection to task  Emotional at times/frustrated  Pt repetative and stating "I just need to see my "  Limited insight into deficits, questionable safety  Cognition Assessment Tools (S)  ACLS   Score (S)  4 2   Assessment   Limitation Decreased ADL status; Decreased Safe judgement during ADL;Decreased cognition;Decreased endurance;Decreased self-care trans;Decreased high-level ADLs   Prognosis Good   Assessment Pt is a 70 y o  female seen for OT evaluation s/p admit to [address] [place] [address] on 2/9/2023 w/Altered mental status, unspecified  Orders received for OT evaluation and treatment  Pt identified during session via name and wristband  Comorbidities affecting patient at time of evaluation include: BL cellulitis, chronic heart failure, memory impairment, Afib, anxiety and chronic pain  Personal factors affecting patient at time of evaluation include: limited caregiver support, steps to enter, limited insight into deficits, flat affect, decreased initiation and engagement, difficulty performing ADLs and difficulty performing IADLs  PTA, patient was independent with functional mobility with RW, independent with ADLS, requiring assist for IADLS, living with spouse and daughter in a 2 level home with 3 steps to enter and retired   The evaluation identifies the following performance deficits: weakness, impaired balance, decreased endurance, increased fall risk, new onset of impairment of functional mobility, decreased ADLS, decreased IADLS, decreased activity tolerance, decreased safety awareness, impaired judgement, decreased cognition and decreased strength, that result in activity limitations (as is outlined above in flowsheet)  The patient's raw score on the AM-PAC Daily Activity inpatient short form is 19, standardized score is 40 22, greater than 39 4  From an OT standpoint, patients at this level may benefit from d/c to Home with home health rehabilitation and 24/7 supervision  Pt will benefit from continued IPOT treatment to address above deficits and maximize level of independence with ADLs and functional mobility in the areas of: grooming , bathing/ shower, dressing, toilet hygiene, transfer to all surfaces and functional ambulation  Goals   Patient Goals to go home to my    LTG Time Frame 10-14   Long Term Goal #1 see goals below   Plan   Treatment Interventions ADL retraining;Functional transfer training; Endurance training;Cognitive reorientation;Patient/family training;Equipment evaluation/education; Compensatory technique education;Continued evaluation; Energy conservation; Activityengagement   Goal Expiration Date 02/22/23   OT Treatment Day 0   OT Frequency 2-3x/wk   Recommendation   OT Discharge Recommendation (S)  Home with home health rehabilitation  (and 24/7 supervison vs PAR pending progress towards goals and level of social support available upon DC)   Additional Comments  Pt seen as a co-eval with PT due to the patient's co-morbidities, clinically unstable presentation, and present impairments which are a regression from the patient's baseline     [facility] Daily Activity Inpatient   Lower Body Dressing 2   Bathing 3   Toileting 3   Upper Body Dressing 3   Grooming 4   Eating 4   Daily Activity Raw Score 19   Daily Activity Standardized Score (Calc for Raw Score >=11) 40 22   AM-Located within [facility] Applied Cognition Inpatient   Following a Speech/Presentation 2   Understanding Ordinary Conversation 4   Taking Medications 2   Remembering Where Things Are Placed or Put Away 3 Remembering List of 4-5 Errands 2   Taking Care of Complicated Tasks 2   Applied Cognition Raw Score 15   Applied Cognition Standardized Score 33 54   Maximo Cognitive Level   Maximo Cognitive Level Score (S)  4 2   Maximo Cognitive Score Recommendation (S)  24 hour supervision   Maximo Cognitive Level Comments (S)  Pt frequently questioning purpose of activity stating "I am perfectly competent"  Rushing through completing stitches despite promting to take her time  No difficulties performing running stitch, made twisted lace error and able to spontaneously correct whipstitch  Able to recognize and correct cross in back error  Prompted to complete cordovan stitch however pt reporting "just show me" and unwilling to attempt prior to demonstration  Demonstration provided however pt still unable to initiate cordovan, reporting "I guess I wasn't paying attention"  Pt declined attempt and requesting second demsonstration  Pt performance did not improve with second demonstration, entered through back of lace or made loop outside of holes and attempted to enter  Unsuccessful problem solving  Assessment ended  4 2    Administered Charise Nissen Cognitive Level Screen (ACLS)  Pt scored 4 2/6 0 indicating 24 Hour supervision is recommended to remove dangerous objects outside the visual field and to solve problems due to minor changes in the environment  Behavior:  Recognizes errors  Identifies problems  Asks for Day/Date  Sequences one activity at a time  Processing speed is slow and may take extra time to complete tasks  Memorization will be very slow  Requires long term repetitive training for new tasks  Keep directions short and concise  Grooming:  Initiates and completes with supplies from familiar accessible locations  Stops and asks for help when an error is made  Expect cuts with use of straight razor  Check every few minutes if left alone  Allow ample time for completion of tasks    Dressing:  Gage Cm clothing items based on striking features like color  May choose to wear a favorite item all the time  May argue with suggestions to change selections  Bathing:  Recognizes need for a bath and may ask if time is appropriate  Collects supplies from a visible location  Follows routine  May miss small hidden areas  Recognizes problem like lack of soap and asks for help  Remove hazards from proximity to bath (ie: electrical appliances)  Walking/exercising:  Ambulates to a familiar location ½ to 1 mile away  May not vary route  May fail to attend to activity or noises outside visual field  May ask for help if lost   May be able to learn a graded exercise program   May become anxious in high stimulus environments (malls, airports, casinos)  May resist going to certain places  Eating:  Initiates coming to table at routine times  Uses utensils  Recognizes errors and asks for help  Attempts to comply with social standards  May have trouble waiting for others  Assist with handling hot foods/liquids  Monitor compliance with special diet  Toileting:  Recognizes difficulties that interfere with toileting routine and asks for help  May be able to report change in bowel or bladder habits  May take much longer than average to complete toileting  Medications:  Initiates taking familiar dose at regular time of day  May not be able to open container and may have incorrect ideas of effects that lead to noncompliance  May not seek assist for problems encountered  Supervise to ensure compliance  Use of adaptive equipment:  Needs help with more complex equipment  May need assistance with fasteners that require fine motor skills  Does not understand the potential hazards of incorrect use  May forget to use equipment  Housekeeping:   Initiates and completes a routine of housekeeping activities  May be able to set priorities but may become fixated on a priority    Cleans, polishes and sweeps one feature at a time   Check for quality of cleaning results  Food preparation:  Does not plan for long term food needs  May go to store repeatedly whenever food is desired  May search cabinet for particular food item and ask for help when items are not found  May prepare a simple meal but may not recognize problems  Store all undesirable equipment away from view  Do not leave alone when using dangerous tools/equipment  Spending money:  May be able to set a priority for an expense that is highly valued  May become fixated on item  May resist help  Shopping:  May go to familiar shops but does not make a list or compare prices  Looks in familiar locations for an item  May ask for help when not found  May insist on purchasing item that is unrealistic or impractical   May resist help  May be anxious in high stimulus environments  Laundry:  May sort laundry by color or other simple striking cues  May view as a priority and initiate regularly  Recognizes errors like too much soap but cannot offer solutions  Traveling:  May go through actions slowly  Can sit unaccompanied up to 1 hour on a bus or train with landmarks as a guide when to get off  Asks for assistance if lost, does not alter familiar routes  May become anxious in high stimulus environments (bus, airport terminals)  Telephone:  Dials a new number written down by checking it 1 digit at a time  Writes down information very slowly  Does not consider a person’s schedule or time of day when calling  Needs assistance to make calls from unfamiliar telephones  May call emergency or familiar numbers excessively  Driving:  Should NOT operate a motor vehicle  Additional Treatment Session   Start Time 1430   End Time 1455   Treatment Assessment Pt seen for additional treatment session to continue with cognitive evaluation with completion of ACLS assessment (see above for further information/recommendation)   In addition, additional treatment to challenge standing tolerance and dynamic standing balance during engagement in ADLs including teeth care at sink in bathroom  Pt able to perform sit>stand from recliner with supervision and walk short household distance to bathroom and back with RW with supervision  Pt able to stand at sink to perform teeth care with unilateral support on sinktop  No LOB  Able to tolerate standing at sink for 5 minutes  Return to recliner with supervision and RW  All needs met  Improved ADL performance in stance this session and able to complete mobility/transfers with supervision  Improved activity tolerance and less report of fatigue  Continue with POC  Additional Treatment Day 1   End of Consult   Patient Position at End of Consult Bedside chair;Bed/Chair alarm activated; All needs within reach   Nurse Communication Nurse aware of consult     GOALS:    *Goals established to promote patient goal of to go home to my :      *ADL transfers with (I) for inc'd independence with ADLs/purposeful tasks     *Brace management with (I) for inc'd independence with self cares    *LB ADL with (S) using AE prn for inc'd independence with self cares    *Toileting with (S) for clothing management and hygiene to increase hygiene/thoroughness in order to reduce caregiver burden    *Increase stand tolerance x 10  m for inc'd tolerance with standing purposeful tasks    *Patient will verbalize 3 safety awareness/ principles to prevent falls in the home setting  *Patient will verbalize and demonstrate use of energy conservation/deep breathing techniques and work simplification skills during functional activities with no verbal cues       *Patient will increase functional mobility to and from bathroom with rolling walker independently to increase independence with toileting    *Patient will improve functional activity tolerance to 20 minutes of sustained functional tasks to increase participation in basic self-care and decrease assistance level       Elida Blankenship, OTR/L

## 2025-02-23 NOTE — H&P
"    Patient Name:  Flako Coreas Sr.  YOB: 1965  MRN:  [id number]  Admit Date:  2/23/2025  Patient Care Team:  Akash Rodriguez Jr.,  as PCP - General (Sports Medicine)  Karlene Leach MSW as  ([facility]) ([facility] [place])        Chief Complaint   Patient presents with    Hypertension   \"I cannot bring my blood pressure down\".    History Present Illness     A 59 years old gentleman with a past history of hypertension/atrial fibrillation/cerebellar CVA with ataxia and left upper extremity weakness/GERD/Lepe's esophagus/diverticulosis/peptic ulcer disease/mood disorder and memory deficit/dyslipidemia and left vertebral artery stenosis.  Patient has reported that his blood pressure continues to be elevated over the last 2 weeks and is not coming down easily.  He does describe blurred vision with death and lower extremity weakness leading him to fall.  Positive head trauma and one of the falls.  No loss of consciousness or dizziness.  Results positive of left-sided weakness.  No slurred speech.  No double vision or loss of the vision.  He does describe palpitation.  There is no chest pain.  No shortness of breath.  No PND or orthopnea.  No ankle edema.  No cough.  No wheeze.  No hemoptysis.  Patient reported that he came to the ER yesterday and was given medication to lower his blood pressure down and he was sent home.  He states that he has taken a total of 15 mg of Norvasc/10 mg of Coreg/80 mg of lisinopril/hydralazine hours.  Patient reports that last admission he was here approximately 2 weeks ago he was treated as an alcohol withdrawal with benzodiazepines and this has caused problems with mentation and level of consciousness and states that he would not want to take benzodiazepines or narcotics.  Patient states that he does not smoke.  Patient states that intake was 2 to 3 weeks ago.  He reports that he usually does not drink much approximately 1 drink per day " before he quit completely for 3 weeks.        Review of Systems   .  Positive frequency and urgency.  No dysuria or hematuria.  GI.  No abdominal pain.  No nausea or vomiting.  Normal bowel habits without constipation/diarrhea/bleeding per rectum/melena.  Psychiatry.  Positive anxiety but no sad moods.  No suicidal ideation.  CNS.  As above.  Cardiovascular.  As above.    Personal History     Past Medical History:   Diagnosis Date    ADHD (attention deficit hyperactivity disorder)     On going issue    Anxiety     Lepe's esophagus     Benign prostatic hyperplasia Surgery in 12/2021    Brain concussion 11/2023    Clotting disorder Intestinal    Depression     Diverticulitis     Diverticulosis     Duodenitis     Enteritis     Failure to thrive (child)     Family history of blood clots     Fracture of wrist 1985    Fracture, foot 2019    GERD (gastroesophageal reflux disease)     Hyperlipidemia     Hypertension     Hypertensive emergency     Irritable bowel syndrome 2017    Low testosterone     Microcytosis     Pancreatitis     Pneumonia     PONV (postoperative nausea and vomiting)     Seasonal affective disorder     Shoulder injury     Stroke     Unexplained weight loss     Visual impairment 8/2023     Past Surgical History:   Procedure Laterality Date    CARDIAC ELECTROPHYSIOLOGY PROCEDURE N/A 01/24/2024    Procedure: Loop insertion- [employer] LINQ;  Surgeon: Kaela Walker MD;  Location: [facility] CATH INVASIVE LOCATION;  Service: Cardiovascular;  Laterality: N/A;    COLON SURGERY      COLONOSCOPY      COLONOSCOPY N/A 12/11/2022    Procedure: COLONOSCOPY INTO CECUM WITH HOT SNARE POLYPECTOMY;  Surgeon: Albert Gallo MD;  Location: [facility] ENDOSCOPY;  Service: Gastroenterology;  Laterality: N/A;  PRE- GI BLEED  POST- DIVERTICULOSIS, POLYP    COLONOSCOPY N/A 02/14/2024    Procedure: COLONOSCOPY into cecum/terminal ileum;  Surgeon: Albert Gallo MD;  Location: [facility] ENDOSCOPY;  Service: Gastroenterology;   Laterality: N/A;  diverticulosis, hemorrhoids    ENDOSCOPY N/A 12/10/2022    Procedure: ESOPHAGOGASTRODUODENOSCOPY;  Surgeon: Albert Gallo MD;  Location: [facility] ENDOSCOPY;  Service: Gastroenterology;  Laterality: N/A;  PRE- DARK STOOLS  POST- BARRETTS ESOPHAGUS    ENDOSCOPY N/A 02/14/2024    Procedure: ESOPHAGOGASTRODUODENOSCOPY with biopsy;  Surgeon: Albert Gallo MD;  Location: [facility] ENDOSCOPY;  Service: Gastroenterology;  Laterality: N/A;  long segment wilson's, hiatal hernia    FRACTURE SURGERY Right 1980    for broken arm    FRACTURE SURGERY Right     for broken hand    HAND SURGERY  1990    INCISION AND DRAINAGE ABSCESS  2015    anal    PROSTATE SURGERY      SHOULDER SURGERY  2022    SMALL INTESTINE SURGERY      TONSILLECTOMY      TRIGGER POINT INJECTION  2017     Family History   Problem Relation Age of Onset    Stroke Mother     Heart disease Mother     Stroke Father     Hyperlipidemia Father     Hypertension Father      Social History     Tobacco Use    Smoking status: Never    Smokeless tobacco: Never   Vaping Use    Vaping status: Never Used   Substance Use Topics    Alcohol use: Not Currently    Drug use: Not Currently     Frequency: 1.0 times per week     Current Facility-Administered Medications on File Prior to Encounter   Medication Dose Route Frequency Provider Last Rate Last Admin    [COMPLETED] labetalol (NORMODYNE,TRANDATE) injection 20 mg  20 mg Intravenous Once Enrique Flores MD   20 mg at 02/23/25 0055     Current Outpatient Medications on File Prior to Encounter   Medication Sig Dispense Refill    amLODIPine (NORVASC) 5 MG tablet Take 1 tablet by mouth Daily. 30 tablet 0    ASPIRIN 81 PO Take  by mouth. (Patient not taking: Reported on 1/25/2025)      atorvastatin (LIPITOR) 40 MG tablet Take 1 tablet by mouth Every Night. 30 tablet 0    carvedilol (COREG) 3.125 MG tablet Take 1 tablet by mouth 2 (Two) Times a Day With Meals. Pt unsure of dose      diazePAM (Valium) 2 MG tablet  Take 1 tablet by mouth Every 12 (Twelve) Hours As Needed for Anxiety. 5 tablet 0    folic acid (FOLVITE) 1 MG tablet Take 1 tablet by mouth Daily. 30 tablet 0    lisinopril (PRINIVIL,ZESTRIL) 40 MG tablet Take 1 tablet by mouth Daily for 30 doses. 30 tablet 0    multivitamin with minerals tablet tablet Take 1 tablet by mouth Daily.      pantoprazole (PROTONIX) 40 MG EC tablet Take 1 tablet by mouth Every Morning Before Breakfast. 30 tablet 0    thiamine (VITAMIN B1) 100 MG tablet Take 1 tablet by mouth Daily. 30 tablet 0    vilazodone (Viibryd) 40 MG tablet tablet Take 1 tablet by mouth Daily. 90 tablet 1    vitamin B-12 (CYANOCOBALAMIN) 500 MCG tablet Take 1 tablet by mouth Daily. (Patient not taking: Reported on 7/3/2024) 90 tablet 0     No Known Allergies    Objective    Objective     Vital Signs  Temp:  [97.2 °F (36.2 °C)-99.1 °F (37.3 °C)] 99.1 °F (37.3 °C)  Heart Rate:  [60-75] 67  Resp:  [16-18] 18  BP: (170-202)/(108-158) 172/119  SpO2:  [94 %-97 %] 96 %  on   ;   Device (Oxygen Therapy): room air  Body mass index is 28.87 kg/m².    Physical Exam  General.  Middle-aged gentleman.  He is alert and oriented x 4.  In no apparent pain/distress/diaphoresis.  Normal mood and affect.  Patient anxious.  Eyes.  Pupils equal round and reactive intact extraocular musculature.  No pallor or jaundice  Oral cavity.  Moist mucous membranes  Neck.  Supple.  No JVD.  No lymphadenopathy or thyromegaly  Cardio vascular.  Regular rate and rhythm with no gallops or murmurs  Chest.  Clear to auscultation bilaterally with no added sounds  Abdomen.  Soft lax.  No tenderness.  No organomegaly.  No guarding or rebound.  Extremities.  No clubbing/cyanosis/edema  CNS.  No acute focal neurological deficits.    Results Review:  I reviewed the patient's new clinical results.  I reviewed the patient's new imaging results and agree with the interpretation.  I reviewed the patient's other test results and agree with the interpretation  I  personally viewed and interpreted the patient's EKG/Telemetry data  Discussed with ED provider.    Lab Results (last 24 hours)       Procedure Component Value Units Date/Time    CBC & Differential [[id number]]  (Abnormal) Collected: 02/23/25 0057    Specimen: Blood Updated: 02/23/25 0111    Narrative:      The following orders were created for panel order CBC & Differential.  Procedure                               Abnormality         Status                     ---------                               -----------         ------                     CBC Auto Differential[[id number]]        Abnormal            Final result                 Please view results for these tests on the individual orders.    Comprehensive Metabolic Panel [[id number]]  (Abnormal) Collected: 02/23/25 0057    Specimen: Blood Updated: 02/23/25 0132     Glucose 115 mg/dL      BUN 15 mg/dL      Creatinine 0.83 mg/dL      Sodium 138 mmol/L      Potassium 4.1 mmol/L      Chloride 107 mmol/L      CO2 21.0 mmol/L      Calcium 9.3 mg/dL      Total Protein 7.4 g/dL      Albumin 4.4 g/dL      ALT (SGPT) 22 U/L      AST (SGOT) 17 U/L      Alkaline Phosphatase 75 U/L      Total Bilirubin 0.4 mg/dL      Globulin 3.0 gm/dL      A/G Ratio 1.5 g/dL      BUN/Creatinine Ratio 18.1     Anion Gap 10.0 mmol/L      eGFR 102.1 mL/min/1.73     Narrative:      GFR Categories in Chronic Kidney Disease (CKD)      GFR Category          GFR (mL/min/1.73)    Interpretation  G1                     90 or greater         Normal or high (1)  G2                      60-89                Mild decrease (1)  G3a                   45-59                Mild to moderate decrease  G3b                   30-44                Moderate to severe decrease  G4                    15-29                Severe decrease  G5                    14 or less           Kidney failure          (1)In the absence of evidence of kidney disease, neither GFR category G1 or G2 fulfill the criteria for  CKD.    eGFR calculation 2021 CKD-EPI creatinine equation, which does not include race as a factor    High Sensitivity Troponin T [[id number]]  (Normal) Collected: 02/23/25 0057    Specimen: Blood Updated: 02/23/25 0132     HS Troponin T <6 ng/L     Narrative:      High Sensitive Troponin T Reference Range:  <14.0 ng/L- Negative Female for AMI  <22.0 ng/L- Negative Male for AMI  >=14 - Abnormal Female indicating possible myocardial injury.  >=22 - Abnormal Male indicating possible myocardial injury.   Clinicians would have to utilize clinical acumen, EKG, Troponin, and serial changes to determine if it is an Acute Myocardial Infarction or myocardial injury due to an underlying chronic condition.         CBC Auto Differential [[id number]]  (Abnormal) Collected: 02/23/25 0057    Specimen: Blood Updated: 02/23/25 0111     WBC 7.95 10*3/mm3      RBC 5.53 10*6/mm3      Hemoglobin 15.7 g/dL      Hematocrit 47.2 %      MCV 85.4 fL      MCH 28.4 pg      MCHC 33.3 g/dL      RDW 14.2 %      RDW-SD 44.1 fl      MPV 9.7 fL      Platelets 236 10*3/mm3      Neutrophil % 60.6 %      Lymphocyte % 25.5 %      Monocyte % 7.2 %      Eosinophil % 6.2 %      Basophil % 0.4 %      Immature Grans % 0.1 %      Neutrophils, Absolute 4.82 10*3/mm3      Lymphocytes, Absolute 2.03 10*3/mm3      Monocytes, Absolute 0.57 10*3/mm3      Eosinophils, Absolute 0.49 10*3/mm3      Basophils, Absolute 0.03 10*3/mm3      Immature Grans, Absolute 0.01 10*3/mm3      nRBC 0.0 /100 WBC     High Sensitivity Troponin T [[id number]]  (Normal) Collected: 02/23/25 0952    Specimen: Blood Updated: 02/23/25 1023     HS Troponin T <6 ng/L     Narrative:      High Sensitive Troponin T Reference Range:  <14.0 ng/L- Negative Female for AMI  <22.0 ng/L- Negative Male for AMI  >=14 - Abnormal Female indicating possible myocardial injury.  >=22 - Abnormal Male indicating possible myocardial injury.   Clinicians would have to utilize clinical acumen, EKG, Troponin, and  serial changes to determine if it is an Acute Myocardial Infarction or myocardial injury due to an underlying chronic condition.         High Sensitivity Troponin T 1Hr [[id number]] Collected: 02/23/25 1051    Specimen: Blood Updated: 02/23/25 1123     HS Troponin T <6 ng/L      Troponin T Numeric Delta --     Comment: Unable to calculate.       Narrative:      High Sensitive Troponin T Reference Range:  <14.0 ng/L- Negative Female for AMI  <22.0 ng/L- Negative Male for AMI  >=14 - Abnormal Female indicating possible myocardial injury.  >=22 - Abnormal Male indicating possible myocardial injury.   Clinicians would have to utilize clinical acumen, EKG, Troponin, and serial changes to determine if it is an Acute Myocardial Infarction or myocardial injury due to an underlying chronic condition.                 Imaging Results (Last 24 Hours)       Procedure Component Value Units Date/Time    CT Head Without Contrast [[id number]] Collected: 02/23/25 1203     Updated: 02/23/25 1203    Narrative:      CT OF THE BRAIN WITHOUT CONTRAST 02/23/2025     HISTORY: Hypertension. Vision changes.     Axial images were obtained through the brain without intravenous  contrast.     The brain parenchyma and ventricular system appear within normal limits.  No mass lesions, midline shift, intracranial hemorrhage or evidence of  infarction is demonstrated. There is some distal left vertebral artery  calcification. Bony structures appear unremarkable.       Impression:      No acute process.      Radiation dose reduction techniques were utilized, including automated  exposure control and exposure modulation based on body size.                  Results for orders placed during the hospital encounter of 01/25/25    Adult Transthoracic Echo Complete W/ Cont if Necessary Per Protocol    Interpretation Summary    Left ventricular ejection fraction appears to be 56 - 60%.    The left ventricular cavity is borderline dilated.    Left ventricular  diastolic function was normal.    Estimated right ventricular systolic pressure from tricuspid regurgitation is normal (<35 mmHg).      No orders to display            Assessment/Plan     Active Hospital Problems    Diagnosis  POA    **Hypertensive urgency [I16.0]  Yes      Resolved Hospital Problems   No resolved problems to display.           Hypertensive urgency in a patient with a history of atrial fibrillation and hypertension.  Cardiovascular examination is unremarkable.  CNS examination is unremarkable.  Patient states that he has received 80 mg of lisinopril and 20 mg of Norvasc and 200 mg of hydralazine and 9.75 mg of Coreg over the last 24 hours.  At this time I will stop Norvasc continue Coreg at the higher dose.  Continue maximum dose of lisinopril and initiate  Cardene drip.  Patient troponins x 3 is negative.  CT scan of the brain without acute disease.  Chest x-ray with no pulmonary edema or significant cardiomegaly.  EKG with normal sinus rhythm.  Patient last echo on 1/2025 revealing a normal ejection fraction with mild MR.  Will rule out secondary causes of hypertension by checking TSH/aldosterone level/VMA, serotonin ,catecholamines in the urine/renal arterial duplex.  Titrate Cardene drip.  Consult cardiology.  History of cerebellar CVA leading to ataxia/hypercholesterolemia/left vertebral artery stenosis.  CNS examination without acute deficit.  CT scan of the brain without acute abnormalities.  Continue aspirin and Lipitor.  GERD/Lepe's/peptic ulcer disease/diverticular disease.  Benign GI examination.  Continue Protonix.  Mood disorder/anxiety and memory deficit.  Continue Viibryd.  Initiate BuSpar.  Patient wants to avoid any benzodiazepines  Hyperglycemia.  Check A1c.  History of alcohol use..  Patient states that he stopped alcohol completely 2 to 3 weeks ago.  He also states that he used to drink an average of 1 drink per day.  He absolutely declines benzodiazepine and any CIWA  protocol.  I do not believe that he is in any withdrawal at this time  VTE prophylaxis.  Sequential compression devices      Discussed my findings and plan of treatment with the patient/ER provider.    Code Status -full code.       Ester Bright MD  [facility]  02/23/25  15:10 EST

## 2025-02-23 NOTE — TELEPHONE ENCOUNTER
"Called back stated BP now 226/141, has called an ambulance and is going to go to ER  Reason for Disposition   Sounds like a life-threatening emergency to the triager    Additional Information   Negative: Difficult to awaken or acting confused (e.g., disoriented, slurred speech)   Negative: SEVERE difficulty breathing (e.g., struggling for each breath, speaks in single words)   Negative: [1] Weakness of the face, arm or leg on one side of the body AND [2] new-onset   Negative: [1] Numbness (i.e., loss of sensation) of the face, arm or leg on one side of the body AND [2] new-onset   Negative: [1] Chest pain lasts > 5 minutes AND [2] history of heart disease (i.e., heart attack, bypass surgery, angina, angioplasty, CHF)   Negative: [1] Chest pain AND [2] took nitrogylcerin AND [3] pain was not relieved    Answer Assessment - Initial Assessment Questions  1. REASON FOR CALL or QUESTION: \"What is your reason for calling today?\" or \"How can I best help you?\" or \"What question do you have that I can help answer?\"  Called back stated BP now 226/141, has called an ambulance and is going to go to ER    Answer Assessment - Initial Assessment Questions  1. BLOOD PRESSURE: \"What is the blood pressure?\" \"Did you take at least two measurements 5 minutes apart?\"      226/141  2. ONSET: \"When did you take your blood pressure?\"      Prior to call  3. HOW: \"How did you take your blood pressure?\" (e.g., automatic home BP monitor, visiting nurse)      Automatic home BP monitor  4. HISTORY: \"Do you have a history of high blood pressure?\"      yes  5. MEDICINES: \"Are you taking any medicines for blood pressure?\" \"Have you missed any doses recently?\"      Amlodipine, coreg, lisinopril  6. OTHER SYMPTOMS: \"Do you have any symptoms?\" (e.g., blurred vision, chest pain, difficulty breathing, headache, weakness)      -  7. PREGNANCY: \"Is there any chance you are pregnant?\" \"When was your last menstrual period?\"      N/a    Protocols used: " Information Only Call-ADULT-AH, Blood Pressure - High-ADULT-AH

## 2025-02-23 NOTE — ED TRIAGE NOTES
"Pt arrives ambulatory to triage desk via PV.    Pt states \"I can't get my blood pressure down, I have taken and extra 200mg of hydralazine and a valium. I have also went down twice in the last week.\"    Pt denies LOC when falling, however, he did hit his head and that was on Tuesday.    Pt is AxOx4 at triage, and ambulates with a steady gait. He does report having Hx of strokes last year. However, has never followed up with neurology. Pt denies a HA, NV, visual issues, numbness/tingling or dizziness at this time. He states \"I just don't feel right.\" Pt is tearful, and appears anxious at triage. This RN does not note any neuro deficits except for weakness in the left hand that is from prior strokes per Pt.  "

## 2025-02-23 NOTE — TELEPHONE ENCOUNTER
Pt called me from the ER Room he is checked in but states no one will help him, he states his bp is 197/135 right, having blurred vision, not feeling well, feeling his pulse now in his left hip he stated.  Pt states he doesn't know what to do.  I called the ER for him, spoke to Kim in ER, she is going to tell his nurse and check on this patient.  During this time, the patient hung up on me, attempted to call back without an answer.

## 2025-02-23 NOTE — TELEPHONE ENCOUNTER
"Caller reported just discharged from hospital at 0230 this morning, BP now 201/128. Has not taken his morning meds yet. Reported felt shaky, denied chest pain or headache.   Advised to go to ER  Caller stated would take meds and see if goes down and will call triage line back    Reason for Disposition   [1] Systolic BP  >= 200 OR Diastolic >= 120 AND [2] having NO cardiac or neurologic symptoms    Additional Information   Negative: Difficult to awaken or acting confused (e.g., disoriented, slurred speech)   Negative: SEVERE difficulty breathing (e.g., struggling for each breath, speaks in single words)   Negative: [1] Weakness of the face, arm or leg on one side of the body AND [2] new-onset   Negative: [1] Numbness (i.e., loss of sensation) of the face, arm or leg on one side of the body AND [2] new-onset   Negative: [1] Chest pain lasts > 5 minutes AND [2] history of heart disease (i.e., heart attack, bypass surgery, angina, angioplasty, CHF)   Negative: [1] Chest pain AND [2] took nitrogylcerin AND [3] pain was not relieved   Negative: Sounds like a life-threatening emergency to the triager   Negative: Symptom is main concern (e.g., headache, chest pain)   Negative: Low blood pressure is main concern   Negative: [1] Systolic BP  >= 160 OR Diastolic >= 100 AND [2] cardiac (e.g., breathing difficulty, chest pain) or neurologic symptoms (e.g., new-onset blurred or double vision, unsteady gait)   Negative: [1] Pregnant 20 or more weeks (or postpartum < 6 weeks) AND [2] new hand or face swelling   Negative: [1] Pregnant 20 or more weeks (or postpartum < 6 weeks) AND [2] Systolic BP >= 160 OR Diastolic >= 110    Answer Assessment - Initial Assessment Questions  1. BLOOD PRESSURE: \"What is the blood pressure?\" \"Did you take at least two measurements 5 minutes apart?\"      201/128  2. ONSET: \"When did you take your blood pressure?\"      Prior to calling  3. HOW: \"How did you take your blood pressure?\" (e.g., automatic " "home BP monitor, visiting nurse)      Automatic home BP monitor  4. HISTORY: \"Do you have a history of high blood pressure?\"      Yes, was just discharged from hospital at 0230 this morning, was in hosp d/t high BP  5. MEDICINES: \"Are you taking any medicines for blood pressure?\" \"Have you missed any doses recently?\"      Amlodipine, coreg, lisinopril  6. OTHER SYMPTOMS: \"Do you have any symptoms?\" (e.g., blurred vision, chest pain, difficulty breathing, headache, weakness)      Feels shaky, denied chest pain or headache  7. PREGNANCY: \"Is there any chance you are pregnant?\" \"When was your last menstrual period?\"      N/a    Protocols used: Blood Pressure - High-ADULT-AH    "

## 2025-02-24 ENCOUNTER — APPOINTMENT (OUTPATIENT)
Dept: CARDIOLOGY | Facility: HOSPITAL | Age: 60
End: 2025-02-24
Payer: COMMERCIAL

## 2025-02-24 ENCOUNTER — APPOINTMENT (OUTPATIENT)
Dept: GENERAL RADIOLOGY | Facility: HOSPITAL | Age: 60
End: 2025-02-24
Payer: COMMERCIAL

## 2025-02-24 LAB
ANION GAP SERPL CALCULATED.3IONS-SCNC: 9.5 MMOL/L (ref 5–15)
BASOPHILS # BLD AUTO: 0.03 10*3/MM3 (ref 0–0.2)
BASOPHILS NFR BLD AUTO: 0.4 % (ref 0–1.5)
BH CV ECHO MEAS - DIST REN A EDV LEFT: 14.1 CM/S
BH CV ECHO MEAS - DIST REN A PSV LEFT: 36.3 CM/S
BH CV ECHO MEAS - MID REN A EDV LEFT: 13.9 CM/S
BH CV ECHO MEAS - MID REN A PSV LEFT: 34.3 CM/S
BH CV ECHO MEAS - PROX REN A EDV LEFT: 12.4 CM/S
BH CV ECHO MEAS - PROX REN A PSV LEFT: 32 CM/S
BH CV VAS BP LEFT ARM: NORMAL MMHG
BH CV VAS BP RIGHT ARM: NORMAL MMHG
BH CV VAS RENAL AORTIC MID PSV: 105 CM/S
BH CV VAS RENAL HILUM LEFT EDV: 15.3 CM/S
BH CV VAS RENAL HILUM LEFT PSV: 44.1 CM/S
BH CV VAS RENAL HILUM RIGHT EDV: 15.6 CM/S
BH CV VAS RENAL HILUM RIGHT PSV: 33.6 CM/S
BH CV XLRA MEAS - KID L LEFT: 11.3 CM
BH CV XLRA MEAS DIST REN A EDV RIGHT: 14.1 CM/S
BH CV XLRA MEAS DIST REN A PSV RIGHT: 36.5 CM/S
BH CV XLRA MEAS KID L RIGHT: 12.2 CM
BH CV XLRA MEAS KID W RIGHT: 6.4 CM
BH CV XLRA MEAS MID REN A EDV RIGHT: 19 CM/S
BH CV XLRA MEAS MID REN A PSV RIGHT: 50.2 CM/S
BH CV XLRA MEAS PROX REN A EDV RIGHT: 26.3 CM/S
BH CV XLRA MEAS PROX REN A PSV RIGHT: 61.4 CM/S
BH CV XLRA MEAS RAR LEFT: 0.35
BH CV XLRA MEAS RAR RIGHT: 0.58
BH CV XLRA MEAS RENAL A ORG EDV LEFT: 8.7 CM/S
BH CV XLRA MEAS RENAL A ORG EDV RIGHT: 17.2 CM/S
BH CV XLRA MEAS RENAL A ORG PSV LEFT: 47.5 CM/S
BH CV XLRA MEAS RENAL A ORG PSV RIGHT: 72.3 CM/S
BUN SERPL-MCNC: 14 MG/DL (ref 6–20)
BUN/CREAT SERPL: 17.1 (ref 7–25)
CALCIUM SPEC-SCNC: 9.3 MG/DL (ref 8.6–10.5)
CHLORIDE SERPL-SCNC: 106 MMOL/L (ref 98–107)
CO2 SERPL-SCNC: 22.5 MMOL/L (ref 22–29)
CREAT SERPL-MCNC: 0.82 MG/DL (ref 0.76–1.27)
DEPRECATED RDW RBC AUTO: 43.3 FL (ref 37–54)
EGFRCR SERPLBLD CKD-EPI 2021: 102.5 ML/MIN/1.73
EOSINOPHIL # BLD AUTO: 0.45 10*3/MM3 (ref 0–0.4)
EOSINOPHIL NFR BLD AUTO: 5.9 % (ref 0.3–6.2)
ERYTHROCYTE [DISTWIDTH] IN BLOOD BY AUTOMATED COUNT: 14 % (ref 12.3–15.4)
GLUCOSE SERPL-MCNC: 111 MG/DL (ref 65–99)
HCT VFR BLD AUTO: 46.6 % (ref 37.5–51)
HGB BLD-MCNC: 15.5 G/DL (ref 13–17.7)
IMM GRANULOCYTES # BLD AUTO: 0.01 10*3/MM3 (ref 0–0.05)
IMM GRANULOCYTES NFR BLD AUTO: 0.1 % (ref 0–0.5)
LEFT KIDNEY WIDTH: 4.3 CM
LEFT RENAL UPPER PARENCHYMA MAX: 39.7 CM/S
LEFT RENAL UPPER PARENCHYMA MIN: 15.3 CM/S
LEFT RENAL UPPER PARENCHYMA RI: 0.61
LYMPHOCYTES # BLD AUTO: 2.05 10*3/MM3 (ref 0.7–3.1)
LYMPHOCYTES NFR BLD AUTO: 26.8 % (ref 19.6–45.3)
MCH RBC QN AUTO: 28 PG (ref 26.6–33)
MCHC RBC AUTO-ENTMCNC: 33.3 G/DL (ref 31.5–35.7)
MCV RBC AUTO: 84.1 FL (ref 79–97)
MONOCYTES # BLD AUTO: 0.72 10*3/MM3 (ref 0.1–0.9)
MONOCYTES NFR BLD AUTO: 9.4 % (ref 5–12)
NEUTROPHILS NFR BLD AUTO: 4.39 10*3/MM3 (ref 1.7–7)
NEUTROPHILS NFR BLD AUTO: 57.4 % (ref 42.7–76)
NRBC BLD AUTO-RTO: 0 /100 WBC (ref 0–0.2)
PLATELET # BLD AUTO: 273 10*3/MM3 (ref 140–450)
PMV BLD AUTO: 9.8 FL (ref 6–12)
POTASSIUM SERPL-SCNC: 3.7 MMOL/L (ref 3.5–5.2)
RBC # BLD AUTO: 5.54 10*6/MM3 (ref 4.14–5.8)
RIGHT RENAL UPPER PARENCHYMA MAX: 33.2 CM/S
RIGHT RENAL UPPER PARENCHYMA MIN: 15.7 CM/S
RIGHT RENAL UPPER PARENCHYMA RI: 0.53
SODIUM SERPL-SCNC: 138 MMOL/L (ref 136–145)
WBC NRBC COR # BLD AUTO: 7.65 10*3/MM3 (ref 3.4–10.8)

## 2025-02-24 PROCEDURE — 80048 BASIC METABOLIC PNL TOTAL CA: CPT | Performed by: INTERNAL MEDICINE

## 2025-02-24 PROCEDURE — G0378 HOSPITAL OBSERVATION PER HR: HCPCS

## 2025-02-24 PROCEDURE — 84585 ASSAY OF URINE VMA: CPT | Performed by: INTERNAL MEDICINE

## 2025-02-24 PROCEDURE — 82384 ASSAY THREE CATECHOLAMINES: CPT | Performed by: INTERNAL MEDICINE

## 2025-02-24 PROCEDURE — 85025 COMPLETE CBC W/AUTO DIFF WBC: CPT | Performed by: INTERNAL MEDICINE

## 2025-02-24 PROCEDURE — 82088 ASSAY OF ALDOSTERONE: CPT | Performed by: STUDENT IN AN ORGANIZED HEALTH CARE EDUCATION/TRAINING PROGRAM

## 2025-02-24 PROCEDURE — 83497 ASSAY OF 5-HIAA: CPT | Performed by: INTERNAL MEDICINE

## 2025-02-24 PROCEDURE — 84244 ASSAY OF RENIN: CPT | Performed by: STUDENT IN AN ORGANIZED HEALTH CARE EDUCATION/TRAINING PROGRAM

## 2025-02-24 PROCEDURE — 97162 PT EVAL MOD COMPLEX 30 MIN: CPT

## 2025-02-24 PROCEDURE — 99214 OFFICE O/P EST MOD 30 MIN: CPT | Performed by: NURSE PRACTITIONER

## 2025-02-24 PROCEDURE — 73502 X-RAY EXAM HIP UNI 2-3 VIEWS: CPT

## 2025-02-24 PROCEDURE — 93975 VASCULAR STUDY: CPT | Performed by: SURGERY

## 2025-02-24 PROCEDURE — 97530 THERAPEUTIC ACTIVITIES: CPT

## 2025-02-24 PROCEDURE — 81050 URINALYSIS VOLUME MEASURE: CPT | Performed by: INTERNAL MEDICINE

## 2025-02-24 PROCEDURE — 93975 VASCULAR STUDY: CPT

## 2025-02-24 RX ORDER — AMLODIPINE BESYLATE 5 MG/1
5 TABLET ORAL
Status: DISCONTINUED | OUTPATIENT
Start: 2025-02-24 | End: 2025-02-25

## 2025-02-24 RX ORDER — FLUTICASONE PROPIONATE 50 MCG
2 SPRAY, SUSPENSION (ML) NASAL DAILY
Status: DISCONTINUED | OUTPATIENT
Start: 2025-02-24 | End: 2025-02-27 | Stop reason: HOSPADM

## 2025-02-24 RX ORDER — SPIRONOLACTONE 25 MG/1
25 TABLET ORAL DAILY
Status: DISCONTINUED | OUTPATIENT
Start: 2025-02-25 | End: 2025-02-26

## 2025-02-24 RX ADMIN — SENNOSIDES AND DOCUSATE SODIUM 2 TABLET: 50; 8.6 TABLET ORAL at 20:43

## 2025-02-24 RX ADMIN — ASPIRIN 81 MG: 81 TABLET, DELAYED RELEASE ORAL at 09:31

## 2025-02-24 RX ADMIN — FOLIC ACID 1 MG: 1 TABLET ORAL at 09:31

## 2025-02-24 RX ADMIN — AMLODIPINE BESYLATE 5 MG: 5 TABLET ORAL at 13:19

## 2025-02-24 RX ADMIN — CARVEDILOL 12.5 MG: 12.5 TABLET, FILM COATED ORAL at 09:32

## 2025-02-24 RX ADMIN — LISINOPRIL 40 MG: 20 TABLET ORAL at 09:31

## 2025-02-24 RX ADMIN — BUSPIRONE HYDROCHLORIDE 10 MG: 10 TABLET ORAL at 13:19

## 2025-02-24 RX ADMIN — ACETAMINOPHEN 650 MG: 325 TABLET, FILM COATED ORAL at 09:31

## 2025-02-24 RX ADMIN — BUSPIRONE HYDROCHLORIDE 10 MG: 10 TABLET ORAL at 21:46

## 2025-02-24 RX ADMIN — PANTOPRAZOLE SODIUM 40 MG: 40 TABLET, DELAYED RELEASE ORAL at 05:45

## 2025-02-24 RX ADMIN — Medication 10 ML: at 20:44

## 2025-02-24 RX ADMIN — CARVEDILOL 12.5 MG: 12.5 TABLET, FILM COATED ORAL at 17:49

## 2025-02-24 RX ADMIN — Medication 100 MG: at 09:31

## 2025-02-24 RX ADMIN — Medication 1 TABLET: at 09:31

## 2025-02-24 RX ADMIN — BUSPIRONE HYDROCHLORIDE 10 MG: 10 TABLET ORAL at 05:45

## 2025-02-24 RX ADMIN — Medication 10 ML: at 09:32

## 2025-02-24 RX ADMIN — SENNOSIDES AND DOCUSATE SODIUM 2 TABLET: 50; 8.6 TABLET ORAL at 09:31

## 2025-02-24 RX ADMIN — VILAZODONE HYDROCHLORIDE 40 MG: 40 TABLET, FILM COATED ORAL at 09:31

## 2025-02-24 RX ADMIN — ATORVASTATIN CALCIUM 40 MG: 20 TABLET, FILM COATED ORAL at 20:43

## 2025-02-24 RX ADMIN — FLUTICASONE PROPIONATE 2 SPRAY: 50 SPRAY, METERED NASAL at 21:46

## 2025-02-24 NOTE — THERAPY EVALUATION
Patient Name: Flako Coreas .  : 1965    MRN: [id number]                              Today's Date: 2025       Admit Date: 2025    Visit Dx:     ICD-10-CM ICD-9-CM   1. Hypertensive urgency  I16.0 401.9   2. Severe uncontrolled hypertension  I10 401.9   3. Blurry vision  H53.8 368.8   4. 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    Anxiety about health    Vertebral artery stenosis, left    Memory loss    History of alcohol dependence    Vasovagal syncope    Elevated lipoprotein(a)    Elevated coronary artery calcium score    PAF (paroxysmal atrial fibrillation)    Syncope    History of atrial fibrillation    Alcohol use    Mood disorder    Alcohol withdrawal    Hypertensive urgency    History of CVA (cerebrovascular accident)    Hyperglycemia    Peptic ulcer disease    History of diverticulosis     Past Medical History:   Diagnosis Date    ADHD (attention deficit hyperactivity disorder)     On going issue    Anxiety     Lepe's esophagus     Benign prostatic hyperplasia Surgery in 2021    Brain concussion 2023    Clotting disorder Intestinal    Depression     Diverticulitis     Diverticulosis     Duodenitis     Enteritis     Failure to thrive (child)     Family history of blood clots     Fracture of wrist 1985    Fracture, foot 2019    GERD (gastroesophageal reflux disease)     Hyperlipidemia     Hypertension     Hypertensive emergency     Irritable bowel syndrome 2017    Low testosterone      Microcytosis     Pancreatitis     Peptic ulcer disease 2/23/2025    Pneumonia     PONV (postoperative nausea and vomiting)     Seasonal affective disorder     Shoulder injury     Stroke     Unexplained weight loss     Visual impairment 8/2023     Past Surgical History:   Procedure Laterality Date    CARDIAC ELECTROPHYSIOLOGY PROCEDURE N/A 01/24/2024    Procedure: Loop insertion- [employer] LINQ;  Surgeon: Kaela Walker MD;  Location: [facility] CATH INVASIVE LOCATION;  Service: Cardiovascular;  Laterality: N/A;    COLON SURGERY      COLONOSCOPY      COLONOSCOPY N/A 12/11/2022    Procedure: COLONOSCOPY INTO CECUM WITH HOT SNARE POLYPECTOMY;  Surgeon: Albert Gallo MD;  Location: [facility] END[facility];  Service: Gastroenterology;  Laterality: N/A;  PRE- GI BLEED  POST- DIVERTICULOSIS, POLYP    COLONOSCOPY N/A 02/14/2024    Procedure: COLONOSCOPY into cecum/terminal ileum;  Surgeon: Albert Gallo MD;  Location: [facility] ENDOSCOPY;  Service: Gastroenterology;  Laterality: N/A;  diverticulosis, hemorrhoids    ENDOSCOPY N/A 12/10/2022    Procedure: ESOPHAGOGASTRODUODENOSCOPY;  Surgeon: Albert Gallo MD;  [facility];  Service: Gastroenterology;  Laterality: N/A;  PRE- DARK STOOLS  POST- BARRETTS ESOPHAGUS    ENDOSCOPY N/A 02/14/2024    Procedure: ESOPHAGOGASTRODUODENOSCOPY with biopsy;  Surgeon: Albert Gallo MD;  Location: [facility] ENDOSCOPY;  Service: Gastroenterology;  Laterality: N/A;  long segment wilson's, hiatal hernia    FRACTURE SURGERY Right 1980    for broken arm    FRACTURE SURGERY Right     for broken hand    HAND SURGERY  1990    INCISION AND DRAINAGE ABSCESS  2015    anal    PROSTATE SURGERY      SHOULDER SURGERY  2022    SMALL INTESTINE SURGERY      TONSILLECTOMY      TRIGGER POINT INJECTION  2017      General Information       Row Name 02/24/25 1143          Physical Therapy Time and Intention    Document Type evaluation  -ST     Mode of Treatment physical therapy  -ST       Row Name  02/24/25 1143          General Information    Patient Profile Reviewed yes  -ST     Existing Precautions/Restrictions fall  -ST       Row Name 02/24/25 1143          Living Environment    People in Home alone  -       Row Name 02/24/25 1143          Cognition    Orientation Status (Cognition) oriented x 4  -ST       Row Name 02/24/25 1143          Safety Issues/Impairments Affecting Functional Mobility    Safety Issues Affecting Function (Mobility) safety precaution awareness;safety precautions follow-through/compliance;insight into deficits/self-awareness;impulsivity  -ST     Impairments Affecting Function (Mobility) balance;endurance/activity tolerance;postural/trunk control;pain  -ST     Comment, Safety Issues/Impairments (Mobility) gait belt, nonskid socks donned  -ST               User Key  (r) = Recorded By, (t) = Taken By, (c) = Cosigned By      Initials Name Provider Type    Brandi Roy PT Physical Therapist                   Mobility       Row Name 02/24/25 1144          Bed Mobility    Bed Mobility supine-sit;sit-supine  -ST     Supine-Sit [place] (Bed Mobility) standby assist  -ST     Assistive Device (Bed Mobility) bed rails;head of bed elevated  -       Row Name 02/24/25 1144          Sit-Stand Transfer    Sit-Stand [place] (Transfers) verbal cues;contact guard  -       Row Name 02/24/25 1144          Gait/Stairs (Locomotion)    [place] Level (Gait) verbal cues;nonverbal cues (demo/gesture);contact guard  -ST     Distance in Feet (Gait) 20  -ST     Deviations/Abnormal Patterns (Gait) stride length decreased;ataxic  -ST     Bilateral Gait Deviations forward flexed posture;heel strike decreased  -ST     Comment, (Gait/Stairs) VC to slow down. significant impulsivity noted  -ST               User Key  (r) = Recorded By, (t) = Taken By, (c) = Cosigned By      Initials Name Provider Type    Brandi Roy PT Physical Therapist                   Obj/Interventions        Row Name 02/24/25 1145          Range of Motion Comprehensive    Comment, General Range of Motion bilat LE WFL  -ST       Row Name 02/24/25 1145          Strength Comprehensive (MMT)    Comment, General Manual Muscle Testing (MMT) Assessment bilat LE L  -ST       Row Name 02/24/25 1145          Balance    Comment, Balance pt with no LOB but significantly impulsive - reports increased speed makes him feel more balanced, however difficult d/t lines  -ST               User Key  (r) = Recorded By, (t) = Taken By, (c) = Cosigned By      Initials Name Provider Type    ST Brandi Jiang, PT Physical Therapist                   Goals/Plan       Row Name 02/24/25 1154          Bed Mobility Goal 1 (PT)    Activity/Assistive Device (Bed Mobility Goal 1, PT) bed mobility activities, all  -ST     [place] Level/Cues Needed (Bed Mobility Goal 1, PT) modified independence  -ST     Time Frame (Bed Mobility Goal 1, PT) 1 week  -ST     Progress/Outcomes (Bed Mobility Goal 1, PT) new goal  -[facility] Name 02/24/25 1154          Transfer Goal 1 (PT)    Activity/Assistive Device (Transfer Goal 1, PT) sit-to-stand/stand-to-sit;bed-to-chair/chair-to-bed  -ST     [place] Level/Cues Needed (Transfer Goal 1, PT) supervision required  -ST     Time Frame (Transfer Goal 1, PT) 1 week  -ST     Progress/Outcome (Transfer Goal 1, PT) new goal  -[facility] Name 02/24/25 1154          Gait Training Goal 1 (PT)    Activity/Assistive Device (Gait Training Goal 1, PT) gait (walking locomotion);assistive device use  -ST     [place] Level (Gait Training Goal 1, PT) standby assist  -ST     Distance (Gait Training Goal 1, PT) 150'  -ST     Time Frame (Gait Training Goal 1, PT) 1 week  -ST     Progress/Outcome (Gait Training Goal 1, PT) new goal  -ST               User Key  (r) = Recorded By, (t) = Taken By, (c) = Cosigned By      Initials Name Provider Type    ST Brandi Jiang, PT Physical Therapist                    Clinical Impression       Row Name 02/24/25 1150          Pain    Pain Location hip  -ST     Pain Side/Orientation left  -ST     Pain Management Interventions exercise or physical activity utilized  -ST     Response to Pain Interventions activity participation with tolerable pain  -ST     Pre/Posttreatment Pain Comment does not rate but improved with feet on floor vs up in recliner  -ST       Row Name 02/24/25 1150          Plan of Care Review    Plan of Care Reviewed With patient  -ST     Outcome Evaluation Pt is 60 y/o M admitted to [facility] on 2/23/25 with hypertensive urgency and recent fall at home. PMH: CVA, HTN, Afib. Pt reports double vision, L UE weakness, and balance deficits remain following CVA, also reports some cognitive difficulty since CVA. At baseline pt is from home alone, typically indep - uses rwx at night some times. Pt currently demos SBA for bed mobility, CGA for transfers and ambulation in room without AD. No overt LOB but significant impulsiveness noted - educated pt on need to slow down for safety but pt reports feeling more unbalanced when he slows down. Pt demos mobility below baseline and therefore would benefit from acute skilled PT to address functional mobility deficits. Anticipate d/c home with OP PT vs SNU pending progress. Educated pt on need to call for assistance with mobility d/t lines and encouraged to sit up in recliner for meals.  -ST       Row Name 02/24/25 1150          Therapy Assessment/Plan (PT)    Rehab Potential (PT) good  -ST     Criteria for Skilled Interventions Met (PT) yes  -ST     Therapy Frequency (PT) 5 times/wk  -ST     Predicted Duration of Therapy Intervention (PT) 1 week  -ST       Row Name 02/24/25 1150          Positioning and Restraints    Pre-Treatment Position in bed  -ST     Post Treatment Position chair  -ST     In Chair notified nsg;sitting;call light within reach;encouraged to call for assist;exit alarm on  -ST               User Key  (r) = Recorded By,  (t) = Taken By, (c) = Cosigned By      Initials Name Provider Type    Brandi Roy PT Physical Therapist                   Outcome Measures       Row Name 02/24/25 1154 02/24/25 0931       How much help from another person do you currently need...    Turning from your back to your side while in flat bed without using bedrails? 4  -ST 3  -JH    Moving from lying on back to sitting on the side of a flat bed without bedrails? 3  -ST 3  -JH    Moving to and from a bed to a chair (including a wheelchair)? 3  -ST 3  -JH    Standing up from a chair using your arms (e.g., wheelchair, bedside chair)? 3  -ST 3  -JH    Climbing 3-5 steps with a railing? 3  -ST 3  -JH    To walk in hospital room? 3  -ST 3  -JH    AM-PAC 6 Clicks Score (PT) 19  -ST 18  -JH    Highest Level of Mobility Goal 6 --> Walk 10 steps or more  -ST 6 --> Walk 10 steps or more  -      Row Name 02/24/25 1154          Functional Assessment    Outcome Measure Options AM-PAC 6 Clicks Basic Mobility (PT)  -               User Key  (r) = Recorded By, (t) = Taken By, (c) = Cosigned By      Initials Name Provider Type    Gissel Sheikh RN Registered Nurse    Brandi Roy PT Physical Therapist                                 Physical Therapy Education       Title: PT OT SLP Therapies (In Progress)       Topic: Physical Therapy (In Progress)       Point: Mobility training (Done)       Learning Progress Summary            Patient Acceptance, TB,E, VU,NR by  at 2/24/2025 1154                      Point: Home exercise program (Not Started)       Learner Progress:  Not documented in this visit.              Point: Body mechanics (Done)       Learning Progress Summary            Patient Acceptance, TB,E, VU,NR by  at 2/24/2025 1154                      Point: Precautions (Done)       Learning Progress Summary            Patient Acceptance, TB,E, VU,NR by  at 2/24/2025 1154                                      User Key       Initials  Effective Dates Name Provider Type Discipline    ST 09/22/22 -  Brandi Jiang PT Physical Therapist PT                  PT Recommendation and Plan     Outcome Evaluation: Pt is 60 y/o M admitted to [facility] on 2/23/25 with hypertensive urgency and recent fall at home. PMH: CVA, HTN, Afib. Pt reports double vision, L UE weakness, and balance deficits remain following CVA, also reports some cognitive difficulty since CVA. At baseline pt is from home alone, typically indep - uses rwx at night some times. Pt currently demos SBA for bed mobility, CGA for transfers and ambulation in room without AD. No overt LOB but significant impulsiveness noted - educated pt on need to slow down for safety but pt reports feeling more unbalanced when he slows down. Pt demos mobility below baseline and therefore would benefit from acute skilled PT to address functional mobility deficits. Anticipate d/c home with OP PT vs SNU pending progress. Educated pt on need to call for assistance with mobility d/t lines and encouraged to sit up in recliner for meals.     Time Calculation:         PT Charges       Row Name 02/24/25 1158             Time Calculation    Start Time 1048  -ST      Stop Time 1111  -ST      Time Calculation (min) 23 min  -ST      PT Received On 02/24/25  -ST      PT - Next Appointment 02/25/25  -ST      PT Goal Re-Cert Due Date 03/03/25  -ST         Time Calculation- PT    Total Timed Code Minutes- PT 10 minute(s)  -ST         Timed Charges    73864 - PT Therapeutic Activity Minutes 10  -ST         Total Minutes    Timed Charges Total Minutes 10  -ST       Total Minutes 10  -ST                User Key  (r) = Recorded By, (t) = Taken By, (c) = Cosigned By      Initials Name Provider Type    ST Brandi Jiang PT Physical Therapist                  Therapy Charges for Today       Code Description Service Date Service Provider Modifiers Qty    43538226781 HC PT THERAPEUTIC ACT EA 15 MIN 2/24/2025 Brandi Jiang  PT GP 1    51821032981 HC PT EVAL MOD COMPLEXITY 2 2/24/2025 Brandi Jiang, PT GP 1            PT G-Codes  Outcome Measure Options: AM-PAC 6 Clicks Basic Mobility (PT)  AM-PAC 6 Clicks Score (PT): 19  PT Discharge Summary  Anticipated Discharge Disposition (PT): home with outpatient therapy services, skilled nursing facility (pending progress)    Brandi Jiang, PT  2/24/2025

## 2025-02-24 NOTE — PLAN OF CARE
Goal Outcome Evaluation:  Plan of Care Reviewed With: patient        Progress: improving  Outcome Evaluation: Patient c/o left hip pain, PRN tylenol given. XR left hip completed. Up in chair with PT. BP improved -160, DBP . Norvasc added. Patient anxious and impulsive at times. Psych consulted. 24 hour urine to be completed at 1715. Renal US completed this morning.

## 2025-02-24 NOTE — PROGRESS NOTES
Name: Flako Coreas . ADMIT: 2025   : 1965  PCP: Akash Rodriguez Jr.,     MRN: [id number] LOS: 0 days   AGE/SEX: 59 y.o. male  ROOM: [facility]     Subjective   Subjective   Patient reports no headache.  No dizziness.  No visual blurring.  No unilateral numbness or weakness.  No dizziness.  No seizures.  He also reports no chest pain.  No palpitation.  No shortness of breath.  No cough.  No wheeze.  No ankle edema.  He reports that his anxiety is better.    Review of Systems  GI.  No abdominal pain.  No nausea or vomiting  .  No dysuria or hematuria.     Objective   Objective   Vital Signs  Temp:  [97.3 °F (36.3 °C)-99.1 °F (37.3 °C)] 99 °F (37.2 °C)  Heart Rate:  [55-97] 72  Resp:  [16-18] 16  BP: (113-179)/() 144/109  SpO2:  [89 %-96 %] 95 %  on   ;   Device (Oxygen Therapy): room air    Intake/Output Summary (Last 24 hours) at 2025 1205  Last data filed at 2025 0931  Gross per 24 hour   Intake 320 ml   Output 1050 ml   Net -730 ml     Body mass index is 28.87 kg/m².      25  0629   Weight: 102 kg (224 lb 13.9 oz)     Physical Exam  General.  Middle-aged gentleman.  He is alert and oriented x 4.  In no apparent pain/distress/diaphoresis.  Still appear very anxious.  Affect normal.  Eyes.  Pupils equal round and reactive intact extraocular musculature.  No pallor or jaundice  Oral cavity.  Moist mucous membranes  Neck.  Supple.  No JVD.  No lymphadenopathy or thyromegaly  Cardio vascular.  Regular rate and rhythm with no gallops or murmurs  Chest.  Clear to auscultation bilaterally with no added sounds  Abdomen.  Soft lax.  No tenderness.  No organomegaly.  No guarding or rebound.  Extremities.  No clubbing/cyanosis/edema  CNS.  No acute focal neurological deficits.     Results Review:      Results from last 7 days   Lab Units 25  0351 25  0057   SODIUM mmol/L 138 138   POTASSIUM mmol/L 3.7 4.1   CHLORIDE mmol/L 106 107   CO2 mmol/L 22.5 21.0*   BUN  "mg/dL 14 15   CREATININE mg/dL 0.82 0.83   GLUCOSE mg/dL 111* 115*   CALCIUM mg/dL 9.3 9.3   AST (SGOT) U/L  --  17   ALT (SGPT) U/L  --  22     Estimated Creatinine Clearance: 126.7 mL/min (by C-G formula based on SCr of 0.82 mg/dL).  Results from last 7 days   Lab Units 02/23/25  1540   HEMOGLOBIN A1C % 5.50         Results from last 7 days   Lab Units 02/23/25  1051 02/23/25  0952 02/23/25  0057   HSTROP T ng/L <6 <6 <6         Results from last 7 days   Lab Units 02/23/25  1540   TSH uIU/mL 0.775               Invalid input(s): \"LDLCALC\"  Results from last 7 days   Lab Units 02/24/25  0351 02/23/25  0057   WBC 10*3/mm3 7.65 7.95   HEMOGLOBIN g/dL 15.5 15.7   HEMATOCRIT % 46.6 47.2   PLATELETS 10*3/mm3 273 236   MCV fL 84.1 85.4   MCH pg 28.0 28.4   MCHC g/dL 33.3 33.3   RDW % 14.0 14.2   RDW-SD fl 43.3 44.1   MPV fL 9.8 9.7   NEUTROPHIL % % 57.4 60.6   LYMPHOCYTE % % 26.8 25.5   MONOCYTES % % 9.4 7.2   EOSINOPHIL % % 5.9 6.2   BASOPHIL % % 0.4 0.4   IMM GRAN % % 0.1 0.1   NEUTROS ABS 10*3/mm3 4.39 4.82   LYMPHS ABS 10*3/mm3 2.05 2.03   MONOS ABS 10*3/mm3 0.72 0.57   EOS ABS 10*3/mm3 0.45* 0.49*   BASOS ABS 10*3/mm3 0.03 0.03   IMMATURE GRANS (ABS) 10*3/mm3 0.01 0.01   NRBC /100 WBC 0.0 0.0                                           Imaging:  Imaging Results (Last 24 Hours)       ** No results found for the last 24 hours. **               I reviewed the patient's new clinical results / labs / tests / procedures      Assessment/Plan     Active Hospital Problems    Diagnosis  POA    **Hypertensive urgency [I16.0]  Yes    History of CVA (cerebrovascular accident) [Z86.73]  Not Applicable    Hyperglycemia [R73.9]  Yes    Peptic ulcer disease [K27.9]  Yes    History of diverticulosis [Z87.19]  Not Applicable    History of atrial fibrillation [Z86.79]  Not Applicable    Mood disorder [F39]  Yes    Alcohol abuse [F10.10]  Yes    History of vertebral artery stenosis [Z86.79]  Not Applicable    Lepe's esophagus [K22.70] "  Yes    GERD (gastroesophageal reflux disease) [K21.9]  Yes    Essential hypertension [I10]  Yes    Hyperlipidemia [E78.5]  Yes      Resolved Hospital Problems   No resolved problems to display.         Hypertensive urgency in a patient with a history of atrial fibrillation and hypertension.  Cardiovascular examination is unremarkable.  CNS examination is unremarkable.  No evidence of angina or CVA.  Patient troponins x 3 is negative.  CT scan of the brain without acute disease.  Chest x-ray with no pulmonary edema or significant cardiomegaly.  EKG with normal sinus rhythm.  Patient last echo on 1/2025 revealing a normal ejection fraction with mild MR. Improved pressure things.  Currently off Cardene drip.  Will continue current dose of Coreg (avoid increasing secondary to borderline bradycardia).  Will initiate Norvasc.  Will continue maximum dose of lisinopril.  There is no clinical evidence of endorgan damage.  TSH is normal.  Renal duplex is negative.  Awaiting aldosterone level/VMA, serotonin ,catecholamines in the urine..  Believes anxiety has a lot to do with his hypertension being uncontrolled.  Will ask psychiatry to see.  Awaiting cardiology consult.    History of cerebellar CVA leading to ataxia/hypercholesterolemia/left vertebral artery stenosis.  CNS examination without acute deficit.  CT scan of the brain without acute abnormalities.  Continue aspirin and Lipitor.  Blood pressure control.  GERD/Lepe's/peptic ulcer disease/diverticular disease.  Benign GI examination.  Continue Protonix.  Mood disorder/anxiety and memory deficit.  Continue Viibryd/BuSpar.  Patient wants to avoid any benzodiazepines will ask psychiatry to see.  Hyperglycemia.  A1c normal at two 5.5.  History of alcohol use..  Patient states that he stopped alcohol completely 2 to 3 weeks ago.  He also states that he used to drink an average of 1 drink per day.  He absolutely declines benzodiazepine and any CIWA protocol.  I do not  believe that he is in any withdrawal at this time  VTE prophylaxis.  Sequential compression devices        Discussed my findings and plan of treatment with the patient/nurse.  Disposition.  To be determined based on clinical course.        Ester Bright MD  [facility]  02/24/25  12:05 EST

## 2025-02-24 NOTE — CASE MANAGEMENT/SOCIAL WORK
Discharge Planning Assessment  [facility]     Patient Name: Flako Coreas Sr.  MRN: [id number]  Today's Date: 2/24/2025    Admit Date: 2/23/2025    Plan: Return home alone, son to transport   Discharge Needs Assessment       Row Name 02/24/25 1537       Living Environment    People in Home alone    Current Living Arrangements home    Potentially Unsafe Housing Conditions none    Primary Care Provided by self    Provides Primary Care For no one    Family Caregiver if Needed child(temo), adult    Quality of Family Relationships helpful;involved;supportive    Able to Return to Prior Arrangements yes       Resource/Environmental Concerns    Resource/Environmental Concerns none    Transportation Concerns none       Transition Planning    Patient/Family Anticipates Transition to home    Patient/Family Anticipated Services at Transition none    Transportation Anticipated family or friend will provide       Discharge Needs Assessment    Readmission Within the Last 30 Days no previous admission in last 30 days    Equipment Currently Used at Home walker, rolling;other (see comments)  shower stool    Concerns to be Addressed care coordination/care conferences;discharge planning    Anticipated Changes Related to Illness none    Equipment Needed After Discharge none    Provided Post Acute Provider List? N/A    Provided Post Acute Provider Quality & Resource List? N/A    Offered/Gave Vendor List no                   Discharge Plan       Row Name 02/24/25 1545       Plan    Plan Return home alone, son to transport    Plan Comments CCP met with the patient at the bedside; introduced self and explained the role of CCP. CCP confirmed the information on his face sheet was accurate. He lives in a 2nd floor apartment alone. There are steps with handrails on both sides and he can reach both at the same time if needed. His PCP is Akash Rodriguez. He denied any HH/Acute Rehab history. He has been to [facility] once in the  past. He is enrolled in the [facility] program. He has a rolling walker and a shower stool. His son Flako can transport him at discharge. His insurance is supposed to start on Friday and go back to January 1, 2025. He said needs a CPAP and has been approved for one through [facility] [place] [facility] ([phone]).  CCP spoke with at [facility] [place] [facility], and she said to call Psychiatric [phone] and ask to speak to nevaeh Felipe voicemail. CCP to follow. JOSE RAUL, CSW.      Row Name 02/24/25 1538       Plan    Patient/Family in Agreement with Plan yes                  Continued Care and Services - Admitted Since 2/23/2025    No active coordination exists for this encounter.       Selected Continued Care - Episodes Includes continued care and service providers with selected services from the active episodes listed below      Ambulatory Social Work Case Management Episode start date: 2/4/2025      Community & DME       Service Provider Services Address Phone Fax Patient Preferred    [place] [employer] Disability [employer] [address] [place] [address] [phone] --                             Demographic Summary       Row Name 02/24/25 1531       General Information    Admission Type observation    Arrived From emergency department    Referral Source admission list    Reason for Consult discharge planning    Preferred Language English                   Functional Status       Row Name 02/24/25 1537       Functional Status    Usual Activity Tolerance moderate    Current Activity Tolerance moderate       Functional Status, IADL    Medications assistive equipment    Meal Preparation assistive equipment    Housekeeping assistive equipment    Laundry assistive equipment    Shopping assistive equipment       Mental Status    General Appearance WDL WDL       Mental Status Summary    Recent Changes in Mental Status/Cognitive Functioning no changes       Employment/     Employment Status unemployed                   Psychosocial    No documentation.                  Abuse/Neglect    No documentation.                  Legal    No documentation.                  Substance Abuse    No documentation.                  Patient Forms    No documentation.

## 2025-02-24 NOTE — PLAN OF CARE
Goal Outcome Evaluation:  Plan of Care Reviewed With: patient   Pt is 60 y/o M admitted to [facility] on 2/23/25 with hypertensive urgency and recent fall at home. PMH: CVA, HTN, Afib. Pt reports double vision, L UE weakness, and balance deficits remain following CVA, also reports some cognitive difficulty since CVA. At baseline pt is from home alone, typically indep - uses rwx at night some times. Pt currently demos SBA for bed mobility, CGA for transfers and ambulation in room without AD. No overt LOB but significant impulsiveness noted - educated pt on need to slow down for safety but pt reports feeling more unbalanced when he slows down. Pt demos mobility below baseline and therefore would benefit from acute skilled PT to address functional mobility deficits. Anticipate d/c home with OP PT vs SNU pending progress. Educated pt on need to call for assistance with mobility d/t lines and encouraged to sit up in recliner for meals.               Anticipated Discharge Disposition (PT): home with outpatient therapy services, skilled nursing facility (pending progress)

## 2025-02-24 NOTE — PLAN OF CARE
Goal Outcome Evaluation:           Progress: improving  Outcome Evaluation: VSS afebrile, BP decreased to the point that cardene drip stopped and remained stable. Pt anxious and shared information about loosing his job and just getting back on his medication now that he has insurance. Case Management ordered to help with home CPAP that has been approved per pt. and any other needs. Order recieved  for Lidocaine patches for R shoulder and L hip, medicated x1 with tylenol for pain. NPO since midnight for renal dopplers. 24 hour urine ongoing. Plan of care ongoing.

## 2025-02-25 LAB
ANION GAP SERPL CALCULATED.3IONS-SCNC: 10.5 MMOL/L (ref 5–15)
BASOPHILS # BLD AUTO: 0.03 10*3/MM3 (ref 0–0.2)
BASOPHILS NFR BLD AUTO: 0.4 % (ref 0–1.5)
BUN SERPL-MCNC: 15 MG/DL (ref 6–20)
BUN/CREAT SERPL: 18.8 (ref 7–25)
CALCIUM SPEC-SCNC: 8.8 MG/DL (ref 8.6–10.5)
CHLORIDE SERPL-SCNC: 110 MMOL/L (ref 98–107)
CO2 SERPL-SCNC: 21.5 MMOL/L (ref 22–29)
CREAT SERPL-MCNC: 0.8 MG/DL (ref 0.76–1.27)
DEPRECATED RDW RBC AUTO: 42.1 FL (ref 37–54)
EGFRCR SERPLBLD CKD-EPI 2021: 103.2 ML/MIN/1.73
EOSINOPHIL # BLD AUTO: 0.55 10*3/MM3 (ref 0–0.4)
EOSINOPHIL NFR BLD AUTO: 6.6 % (ref 0.3–6.2)
ERYTHROCYTE [DISTWIDTH] IN BLOOD BY AUTOMATED COUNT: 13.8 % (ref 12.3–15.4)
GLUCOSE SERPL-MCNC: 99 MG/DL (ref 65–99)
HCT VFR BLD AUTO: 45.6 % (ref 37.5–51)
HGB BLD-MCNC: 15.3 G/DL (ref 13–17.7)
IMM GRANULOCYTES # BLD AUTO: 0.03 10*3/MM3 (ref 0–0.05)
IMM GRANULOCYTES NFR BLD AUTO: 0.4 % (ref 0–0.5)
LYMPHOCYTES # BLD AUTO: 1.87 10*3/MM3 (ref 0.7–3.1)
LYMPHOCYTES NFR BLD AUTO: 22.4 % (ref 19.6–45.3)
MCH RBC QN AUTO: 28.3 PG (ref 26.6–33)
MCHC RBC AUTO-ENTMCNC: 33.6 G/DL (ref 31.5–35.7)
MCV RBC AUTO: 84.3 FL (ref 79–97)
MONOCYTES # BLD AUTO: 0.68 10*3/MM3 (ref 0.1–0.9)
MONOCYTES NFR BLD AUTO: 8.1 % (ref 5–12)
NEUTROPHILS NFR BLD AUTO: 5.19 10*3/MM3 (ref 1.7–7)
NEUTROPHILS NFR BLD AUTO: 62.1 % (ref 42.7–76)
NRBC BLD AUTO-RTO: 0 /100 WBC (ref 0–0.2)
PLATELET # BLD AUTO: 243 10*3/MM3 (ref 140–450)
PMV BLD AUTO: 9.7 FL (ref 6–12)
POTASSIUM SERPL-SCNC: 3.8 MMOL/L (ref 3.5–5.2)
RBC # BLD AUTO: 5.41 10*6/MM3 (ref 4.14–5.8)
SODIUM SERPL-SCNC: 142 MMOL/L (ref 136–145)
WBC NRBC COR # BLD AUTO: 8.35 10*3/MM3 (ref 3.4–10.8)

## 2025-02-25 PROCEDURE — 80048 BASIC METABOLIC PNL TOTAL CA: CPT | Performed by: INTERNAL MEDICINE

## 2025-02-25 PROCEDURE — G0378 HOSPITAL OBSERVATION PER HR: HCPCS

## 2025-02-25 PROCEDURE — 97112 NEUROMUSCULAR REEDUCATION: CPT

## 2025-02-25 PROCEDURE — 85025 COMPLETE CBC W/AUTO DIFF WBC: CPT | Performed by: INTERNAL MEDICINE

## 2025-02-25 PROCEDURE — 90791 PSYCH DIAGNOSTIC EVALUATION: CPT | Performed by: SOCIAL WORKER

## 2025-02-25 PROCEDURE — 97530 THERAPEUTIC ACTIVITIES: CPT

## 2025-02-25 PROCEDURE — 99214 OFFICE O/P EST MOD 30 MIN: CPT | Performed by: INTERNAL MEDICINE

## 2025-02-25 RX ORDER — AMLODIPINE BESYLATE 5 MG/1
5 TABLET ORAL ONCE
Status: COMPLETED | OUTPATIENT
Start: 2025-02-25 | End: 2025-02-25

## 2025-02-25 RX ORDER — AMLODIPINE BESYLATE 10 MG/1
10 TABLET ORAL
Status: DISCONTINUED | OUTPATIENT
Start: 2025-02-26 | End: 2025-02-27 | Stop reason: HOSPADM

## 2025-02-25 RX ORDER — HYDROXYZINE HYDROCHLORIDE 25 MG/1
50 TABLET, FILM COATED ORAL 3 TIMES DAILY PRN
Status: DISCONTINUED | OUTPATIENT
Start: 2025-02-25 | End: 2025-02-27 | Stop reason: HOSPADM

## 2025-02-25 RX ADMIN — BUSPIRONE HYDROCHLORIDE 10 MG: 10 TABLET ORAL at 06:28

## 2025-02-25 RX ADMIN — Medication 10 ML: at 20:24

## 2025-02-25 RX ADMIN — HYDROXYZINE HYDROCHLORIDE 50 MG: 25 TABLET, FILM COATED ORAL at 16:30

## 2025-02-25 RX ADMIN — SENNOSIDES AND DOCUSATE SODIUM 2 TABLET: 50; 8.6 TABLET ORAL at 20:23

## 2025-02-25 RX ADMIN — ACETAMINOPHEN 650 MG: 325 TABLET, FILM COATED ORAL at 16:27

## 2025-02-25 RX ADMIN — AMLODIPINE BESYLATE 5 MG: 5 TABLET ORAL at 18:05

## 2025-02-25 RX ADMIN — LISINOPRIL 40 MG: 20 TABLET ORAL at 08:19

## 2025-02-25 RX ADMIN — AMLODIPINE BESYLATE 5 MG: 5 TABLET ORAL at 08:15

## 2025-02-25 RX ADMIN — Medication 100 MG: at 08:16

## 2025-02-25 RX ADMIN — Medication 10 ML: at 08:22

## 2025-02-25 RX ADMIN — ASPIRIN 81 MG: 81 TABLET, DELAYED RELEASE ORAL at 08:15

## 2025-02-25 RX ADMIN — BUSPIRONE HYDROCHLORIDE 10 MG: 10 TABLET ORAL at 22:33

## 2025-02-25 RX ADMIN — ACETAMINOPHEN 650 MG: 325 TABLET, FILM COATED ORAL at 02:04

## 2025-02-25 RX ADMIN — SENNOSIDES AND DOCUSATE SODIUM 2 TABLET: 50; 8.6 TABLET ORAL at 08:16

## 2025-02-25 RX ADMIN — LIDOCAINE 2 PATCH: 4 PATCH TOPICAL at 08:14

## 2025-02-25 RX ADMIN — ATORVASTATIN CALCIUM 40 MG: 20 TABLET, FILM COATED ORAL at 20:23

## 2025-02-25 RX ADMIN — SPIRONOLACTONE 25 MG: 25 TABLET ORAL at 08:15

## 2025-02-25 RX ADMIN — BUSPIRONE HYDROCHLORIDE 10 MG: 10 TABLET ORAL at 13:29

## 2025-02-25 RX ADMIN — FLUTICASONE PROPIONATE 2 SPRAY: 50 SPRAY, METERED NASAL at 08:22

## 2025-02-25 RX ADMIN — CARVEDILOL 12.5 MG: 12.5 TABLET, FILM COATED ORAL at 17:09

## 2025-02-25 RX ADMIN — FOLIC ACID 1 MG: 1 TABLET ORAL at 08:16

## 2025-02-25 RX ADMIN — CARVEDILOL 12.5 MG: 12.5 TABLET, FILM COATED ORAL at 08:15

## 2025-02-25 RX ADMIN — Medication 1 TABLET: at 08:15

## 2025-02-25 RX ADMIN — VILAZODONE HYDROCHLORIDE 40 MG: 40 TABLET, FILM COATED ORAL at 08:15

## 2025-02-25 RX ADMIN — ACETAMINOPHEN 650 MG: 325 TABLET, FILM COATED ORAL at 08:15

## 2025-02-25 RX ADMIN — ACETAMINOPHEN 650 MG: 325 TABLET, FILM COATED ORAL at 12:29

## 2025-02-25 RX ADMIN — PANTOPRAZOLE SODIUM 40 MG: 40 TABLET, DELAYED RELEASE ORAL at 06:30

## 2025-02-25 NOTE — PROGRESS NOTES
Name: Flako Coreas . ADMIT: 2025   : 1965  PCP: Akash Rodriguez Jr.,     MRN: [id number] LOS: 0 days   AGE/SEX: 59 y.o. male  ROOM: [facility]/     Subjective   Subjective   Still feeling very anxious.  Patient reports no headache.  No dizziness.  No visual blurring.  No unilateral numbness or weakness.  No dizziness.  No seizures.  He also reports no chest pain.  No palpitation.  No shortness of breath.  No cough.  No wheeze.  No ankle edema.      Review of Systems  GI.  No abdominal pain.  No nausea or vomiting  .  No dysuria or hematuria.     Objective   Objective   Vital Signs  Temp:  [97.7 °F (36.5 °C)-98.8 °F (37.1 °C)] 98.8 °F (37.1 °C)  Heart Rate:  [50-72] 70  Resp:  [16-18] 18  BP: (121-156)/() 134/98  SpO2:  [91 %-97 %] 92 %  on   ;   Device (Oxygen Therapy): room air    Intake/Output Summary (Last 24 hours) at 2025 1106  Last data filed at 2025 0725  Gross per 24 hour   Intake 222 ml   Output 500 ml   Net -278 ml     Body mass index is 28.87 kg/m².      25  0629   Weight: 102 kg (224 lb 13.9 oz)     Physical Exam  General.  Middle-aged gentleman.  He is alert and oriented x 4.  In no apparent pain/distress/diaphoresis.  Still appear very anxious.  Affect normal.  Eyes.  Pupils equal round and reactive intact extraocular musculature.  No pallor or jaundice  Oral cavity.  Moist mucous membranes  Neck.  Supple.  No JVD.  No lymphadenopathy or thyromegaly  Cardio vascular.  Regular rate and rhythm with no gallops or murmurs  Chest.  Clear to auscultation bilaterally with no added sounds  Abdomen.  Soft lax.  No tenderness.  No organomegaly.  No guarding or rebound.  Extremities.  No clubbing/cyanosis/edema  CNS.  No acute focal neurological deficits.     Results Review:      Results from last 7 days   Lab Units 25  0604 25  0351 25  0057   SODIUM mmol/L 142 138 138   POTASSIUM mmol/L 3.8 3.7 4.1   CHLORIDE mmol/L 110* 106 107   CO2 mmol/L  "21.5* 22.5 21.0*   BUN mg/dL 15 14 15   CREATININE mg/dL 0.80 0.82 0.83   GLUCOSE mg/dL 99 111* 115*   CALCIUM mg/dL 8.8 9.3 9.3   AST (SGOT) U/L  --   --  17   ALT (SGPT) U/L  --   --  22     Estimated Creatinine Clearance: 129.8 mL/min (by C-G formula based on SCr of 0.8 mg/dL).  Results from last 7 days   Lab Units 02/23/25  1540   HEMOGLOBIN A1C % 5.50         Results from last 7 days   Lab Units 02/23/25  1051 02/23/25  0952 02/23/25  0057   HSTROP T ng/L <6 <6 <6         Results from last 7 days   Lab Units 02/23/25  1540   TSH uIU/mL 0.775               Invalid input(s): \"LDLCALC\"  Results from last 7 days   Lab Units 02/25/25  0604 02/24/25  0351 02/23/25  0057   WBC 10*3/mm3 8.35 7.65 7.95   HEMOGLOBIN g/dL 15.3 15.5 15.7   HEMATOCRIT % 45.6 46.6 47.2   PLATELETS 10*3/mm3 243 273 236   MCV fL 84.3 84.1 85.4   MCH pg 28.3 28.0 28.4   MCHC g/dL 33.6 33.3 33.3   RDW % 13.8 14.0 14.2   RDW-SD fl 42.1 43.3 44.1   MPV fL 9.7 9.8 9.7   NEUTROPHIL % % 62.1 57.4 60.6   LYMPHOCYTE % % 22.4 26.8 25.5   MONOCYTES % % 8.1 9.4 7.2   EOSINOPHIL % % 6.6* 5.9 6.2   BASOPHIL % % 0.4 0.4 0.4   IMM GRAN % % 0.4 0.1 0.1   NEUTROS ABS 10*3/mm3 5.19 4.39 4.82   LYMPHS ABS 10*3/mm3 1.87 2.05 2.03   MONOS ABS 10*3/mm3 0.68 0.72 0.57   EOS ABS 10*3/mm3 0.55* 0.45* 0.49*   BASOS ABS 10*3/mm3 0.03 0.03 0.03   IMMATURE GRANS (ABS) 10*3/mm3 0.03 0.01 0.01   NRBC /100 WBC 0.0 0.0 0.0                                           Imaging:  Imaging Results (Last 24 Hours)       Procedure Component Value Units Date/Time    XR Hip With or Without Pelvis 2 - 3 View Left [[id number]] Collected: 02/24/25 1448     Updated: 02/24/25 1453    Narrative:      XR HIP W OR WO PELVIS 2-3 VIEW LEFT-     INDICATIONS: Trauma     TECHNIQUE: FRONTAL VIEWS OF THE PELVIS, 2 VIEWS OF THE LEFT HIP     COMPARISON: None available     FINDINGS:     No acute fracture, erosion, or dislocation is identified. Hip joint  spaces appear preserved. Arterial calcifications are " noted.  Follow-up/further evaluation can be obtained as indications persist.       Impression:         As described.           This report was finalized on 2/24/2025 2:50 PM by Dr. Ayo Meraz M.D on Workstation: [id number]                  I reviewed the patient's new clinical results / labs / tests / procedures      Assessment/Plan     Active Hospital Problems    Diagnosis  POA    **Hypertensive urgency [I16.0]  Yes    History of CVA (cerebrovascular accident) [Z86.73]  Not Applicable    Hyperglycemia [R73.9]  Yes    Peptic ulcer disease [K27.9]  Yes    History of diverticulosis [Z87.19]  Not Applicable    History of atrial fibrillation [Z86.79]  Not Applicable    Mood disorder [F39]  Yes    Alcohol abuse [F10.10]  Yes    History of vertebral artery stenosis [Z86.79]  Not Applicable    Lepe's esophagus [K22.70]  Yes    GERD (gastroesophageal reflux disease) [K21.9]  Yes    Essential hypertension [I10]  Yes    Hyperlipidemia [E78.5]  Yes      Resolved Hospital Problems   No resolved problems to display.         Hypertensive urgency in a patient with a history of atrial fibrillation and hypertension.  Cardiovascular examination is unremarkable.  CNS examination is unremarkable.  No evidence of angina or CVA.  Patient troponins x 3 is negative.  CT scan of the brain without acute disease.  Chest x-ray with no pulmonary edema or significant cardiomegaly.  EKG with normal sinus rhythm.  Patient last echo on 1/2025 revealing a normal ejection fraction with mild MR. Improved blood pressure.  Currently off Cardene drip.  Will continue current dose of Coreg (avoid increasing secondary to borderline bradycardia).  When on stroke continue dose of lisinopril and Norvasc and Aldactone added by cardiology.  Monitor basic metabolic profile after addition of Aldactone to lisinopril.  There is no clinical evidence of endorgan damage.  TSH is normal.  Renal duplex is negative.  Awaiting aldosterone level/VMA, serotonin  ,catecholamines in the urine.. I Believes anxiety has a lot to do with his hypertension being uncontrolled.  Noted and appreciate cardiology consult.    History of cerebellar CVA leading to ataxia/hypercholesterolemia/left vertebral artery stenosis.  CNS examination without acute deficit.  CT scan of the brain without acute abnormalities.  Continue aspirin and Lipitor.  Continue blood pressure control.  GERD/Lepe's/peptic ulcer disease/diverticular disease.  Benign GI examination.  Continue Protonix.  Mood disorder/anxiety and memory deficit.  Poorly controlled.  Continue Viibryd/BuSpar.  Patient wants to avoid any benzodiazepines.  Awaiting psychiatry consult.  Hyperglycemia.  A1c normal at two 5.5.  History of alcohol use..  Patient states that he stopped alcohol completely 2 to 3 weeks ago.  He also states that he used to drink an average of 1 drink per day.  He absolutely declines benzodiazepine and any CIWA protocol.  I do not believe that he is in any withdrawal at this time  VTE prophylaxis.  Sequential compression devices        Discussed my findings and plan of treatment with the patient/nurse.  Disposition.  Anticipate discharge home tomorrow if blood pressure remains stable and if okay with cardiology and psychiatry.        Ester Bright MD  [facility]  02/25/25  11:06 EST

## 2025-02-25 NOTE — PLAN OF CARE
Goal Outcome Evaluation:  Plan of Care Reviewed With: patient        Progress: improving  Outcome Evaluation: Pt seen for PT this AM - improving mobility noted with rwx and cues to slow down. Ambulated further in hallway today - however noted BP elevated to 142/117 after activity - RN aware and pt returned to supine with no decrease in BP - session cut short as a result. Encouraged pt to ambulate in hallway later with nsg staff, pending BP. He appears very anxious with all mobility, education provided and encouraged pt to slow down and focus on balance vs just trying to move as much as he can. Pt is motivated to continue mobilizing to return home. Educated pt on need for safety at home.    Anticipated Discharge Disposition (PT): home with outpatient therapy services, skilled nursing facility

## 2025-02-25 NOTE — THERAPY TREATMENT NOTE
Patient Name: Flako Coreas .  : 1965    MRN: [id number]                              Today's Date: 2025       Admit Date: 2025    Visit Dx:     ICD-10-CM ICD-9-CM   1. Hypertensive urgency  I16.0 401.9   2. Severe uncontrolled hypertension  I10 401.9   3. Blurry vision  H53.8 368.8   4. 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    Anxiety about health    Vertebral artery stenosis, left    Memory loss    History of alcohol dependence    Vasovagal syncope    Elevated lipoprotein(a)    Elevated coronary artery calcium score    PAF (paroxysmal atrial fibrillation)    Syncope    History of atrial fibrillation    Alcohol use    Mood disorder    Alcohol withdrawal    Hypertensive urgency    History of CVA (cerebrovascular accident)    Hyperglycemia    Peptic ulcer disease    History of diverticulosis     Past Medical History:   Diagnosis Date    ADHD (attention deficit hyperactivity disorder)     On going issue    Anxiety     Lepe's esophagus     Benign prostatic hyperplasia Surgery in 2021    Brain concussion 2023    Clotting disorder Intestinal    Depression     Diverticulitis     Diverticulosis     Duodenitis     Enteritis     Failure to thrive (child)     Family history of blood clots     Fracture of wrist 1985    Fracture, foot 2019    GERD (gastroesophageal reflux disease)     Hyperlipidemia     Hypertension     Hypertensive emergency     Irritable bowel syndrome 2017    Low testosterone      Microcytosis     Pancreatitis     Peptic ulcer disease 2/23/2025    Pneumonia     PONV (postoperative nausea and vomiting)     Seasonal affective disorder     Shoulder injury     Stroke     Unexplained weight loss     Visual impairment 8/2023     Past Surgical History:   Procedure Laterality Date    CARDIAC ELECTROPHYSIOLOGY PROCEDURE N/A 01/24/2024    Procedure: Loop insertion- [employer] LINQ;  Surgeon: Kaela Walker MD;  Location: [facility] CATH INVASIVE LOCATION;  Service: Cardiovascular;  Laterality: N/A;    COLON SURGERY      COLONOSCOPY      COLONOSCOPY N/A 12/11/2022    Procedure: COLONOSCOPY INTO CECUM WITH HOT SNARE POLYPECTOMY;  Surgeon: Albert Gallo MD;  Location: [facility] ENDOSCOPY;  Service: Gastroenterology;  Laterality: N/A;  PRE- GI BLEED  POST- DIVERTICULOSIS, POLYP    COLONOSCOPY N/A 02/14/2024    Procedure: COLONOSCOPY into cecum/terminal ileum;  Surgeon: Albert Gallo MD;  Location: [facility] ENDOSCOPY;  Service: Gastroenterology;  Laterality: N/A;  diverticulosis, hemorrhoids    ENDOSCOPY N/A 12/10/2022    Procedure: ESOPHAGOGASTRODUODENOSCOPY;  Surgeon: Albert Gallo MD;  [facility];  Service: Gastroenterology;  Laterality: N/A;  PRE- DARK STOOLS  POST- BARRETTS ESOPHAGUS    ENDOSCOPY N/A 02/14/2024    Procedure: ESOPHAGOGASTRODUODENOSCOPY with biopsy;  Surgeon: Albert Gallo MD;  Location: [facility] ENDOSCOPY;  Service: Gastroenterology;  Laterality: N/A;  long segment wilson's, hiatal hernia    FRACTURE SURGERY Right 1980    for broken arm    FRACTURE SURGERY Right     for broken hand    HAND SURGERY  1990    INCISION AND DRAINAGE ABSCESS  2015    anal    PROSTATE SURGERY      SHOULDER SURGERY  2022    SMALL INTESTINE SURGERY      TONSILLECTOMY      TRIGGER POINT INJECTION  2017      General Information       Row Name 02/25/25 1154          Physical Therapy Time and Intention    Document Type therapy note (daily note)  -ST     Mode of Treatment physical therapy  -ST        Row Name 02/25/25 1154          General Information    Patient Profile Reviewed yes  -ST     Existing Precautions/Restrictions fall  -ST       Row Name 02/25/25 1154          Cognition    Orientation Status (Cognition) oriented x 4  -ST       Row Name 02/25/25 1154          Safety Issues/Impairments Affecting Functional Mobility    Impairments Affecting Function (Mobility) balance;endurance/activity tolerance;postural/trunk control;pain  -ST     Comment, Safety Issues/Impairments (Mobility) nonskid socks, gait belt donned  -ST               User Key  (r) = Recorded By, (t) = Taken By, (c) = Cosigned By      Initials Name Provider Type    ST Brandi Jiang PT Physical Therapist                   Mobility       Row Name 02/25/25 1154          Bed Mobility    Sit-Supine [place] (Bed Mobility) standby assist  -ST       Row Name 02/25/25 1154          Sit-Stand Transfer    Sit-Stand [place] (Transfers) verbal cues;contact guard  -ST     Assistive Device (Sit-Stand Transfers) walker, front-wheeled  -ST     Comment, (Sit-Stand Transfer) cues for hand placement x 1  -ST       Row Name 02/25/25 1154          Gait/Stairs (Locomotion)    [place] Level (Gait) verbal cues;nonverbal cues (demo/gesture);contact guard  -ST     Assistive Device (Gait) walker, front-wheeled  -ST     Distance in Feet (Gait) 120  -ST     Deviations/Abnormal Patterns (Gait) stride length decreased;ataxic;scissoring  -ST     Bilateral Gait Deviations heel strike decreased  -ST     Comment, (Gait/Stairs) scissors with turns, hand over hand on rwx for stability at times.  -ST               User Key  (r) = Recorded By, (t) = Taken By, (c) = Cosigned By      Initials Name Provider Type    Brandi Roy PT Physical Therapist                   Obj/Interventions       Row Name 02/25/25 1155          Balance    Comment, Balance practiced stability with stepping strategy fwd/lat/retro with 1 UE support x 10 bilat  -ST                User Key  (r) = Recorded By, (t) = Taken By, (c) = Cosigned By      Initials Name Provider Type    Brandi Roy PT Physical Therapist                   Goals/Plan    No documentation.                  Clinical Impression       Row Name 02/25/25 1155          Pain    Pretreatment Pain Rating 0/10 - no pain  -ST     Posttreatment Pain Rating 0/10 - no pain  -ST       Row Name 02/25/25 1155          Plan of Care Review    Plan of Care Reviewed With patient  -ST     Progress improving  -ST     Outcome Evaluation Pt seen for PT this AM - improving mobility noted with rwx and cues to slow down. Ambulated further in hallway today - however noted BP elevated to 142/117 after activity - RN aware and pt returned to supine with no decrease in BP - session cut short as a result. Encouraged pt to ambulate in hallway later with nsg staff, pending BP. He appears very anxious with all mobility, education provided and encouraged pt to slow down and focus on balance vs just trying to move as much as he can. Pt is motivated to continue mobilizing to return home. Educated pt on need for safety at home.  -ST       Row Name 02/25/25 1155          Therapy Assessment/Plan (PT)    Rehab Potential (PT) good  -ST     Criteria for Skilled Interventions Met (PT) yes  -ST     Therapy Frequency (PT) 5 times/wk  -ST       Row Name 02/25/25 1155          Positioning and Restraints    Pre-Treatment Position sitting in chair/recliner  -ST     Post Treatment Position bed  -ST     In Bed notified nsg;supine;call light within reach;encouraged to call for assist;exit alarm on  -ST               User Key  (r) = Recorded By, (t) = Taken By, (c) = Cosigned By      Initials Name Provider Type    Brandi Roy PT Physical Therapist                   Outcome Measures       Row Name 02/25/25 1157 02/25/25 1005       How much help from another person do you currently need...    Turning from your back to your side while in flat bed without  using bedrails? 4  -ST 4  -TM    Moving from lying on back to sitting on the side of a flat bed without bedrails? 4  -ST 3  -TM    Moving to and from a bed to a chair (including a wheelchair)? 3  -ST 3  -TM    Standing up from a chair using your arms (e.g., wheelchair, bedside chair)? 3  -ST 3  -TM    Climbing 3-5 steps with a railing? 3  -ST 3  -TM    To walk in hospital room? 3  -ST 3  -TM    AM-PAC 6 Clicks Score (PT) 20  -ST 19  -TM    Highest Level of Mobility Goal 6 --> Walk 10 steps or more  -ST 6 --> Walk 10 steps or more  -TM              User Key  (r) = Recorded By, (t) = Taken By, (c) = Cosigned By      Initials Name Provider Type    TM Stacy Sullivan, RN Registered Nurse    Brandi Roy PT Physical Therapist                                 Physical Therapy Education       Title: PT OT SLP Therapies (In Progress)       Topic: Physical Therapy (In Progress)       Point: Mobility training (Done)       Learning Progress Summary            Patient Acceptance, E,TB, NR,VU by  at 2/25/2025 1158    Acceptance, TB,E, VU,NR by  at 2/24/2025 1154                      Point: Home exercise program (Not Started)       Learner Progress:  Not documented in this visit.              Point: Body mechanics (Done)       Learning Progress Summary            Patient Acceptance, E,TB, NR,VU by  at 2/25/2025 1158    Acceptance, TB,E, VU,NR by  at 2/24/2025 1154                      Point: Precautions (Done)       Learning Progress Summary            Patient Acceptance, E,TB, NR,VU by  at 2/25/2025 1158    Acceptance, TB,E, VU,NR by  at 2/24/2025 1154                                      User Key       Initials Effective Dates Name Provider Type Discipline     09/22/22 -  Brandi Jiang PT Physical Therapist PT                  PT Recommendation and Plan     Progress: improving  Outcome Evaluation: Pt seen for PT this AM - improving mobility noted with rwx and cues to slow down. Ambulated further in  hallway today - however noted BP elevated to 142/117 after activity - RN aware and pt returned to supine with no decrease in BP - session cut short as a result. Encouraged pt to ambulate in hallway later with nsg staff, pending BP. He appears very anxious with all mobility, education provided and encouraged pt to slow down and focus on balance vs just trying to move as much as he can. Pt is motivated to continue mobilizing to return home. Educated pt on need for safety at home.     Time Calculation:         PT Charges       Row Name 02/25/25 1159             Time Calculation    Start Time 1113  -ST      Stop Time 1138  -ST      Time Calculation (min) 25 min  -ST      PT Received On 02/25/25  -ST      PT - Next Appointment 02/26/25  -ST         Time Calculation- PT    Total Timed Code Minutes- PT 25 minute(s)  -ST         Timed Charges    04966 -  PT Neuromuscular Reeducation Minutes 8  -ST      08800 - PT Therapeutic Activity Minutes 17  -ST         Total Minutes    Timed Charges Total Minutes 25  -ST       Total Minutes 25  -ST                User Key  (r) = Recorded By, (t) = Taken By, (c) = Cosigned By      Initials Name Provider Type    ST Brandi Jiang, PT Physical Therapist                  Therapy Charges for Today       Code Description Service Date Service Provider Modifiers Qty    42348447195 HC PT THERAPEUTIC ACT EA 15 MIN 2/24/2025 Brandi Jiang, PT GP 1    25790832999 HC PT EVAL MOD COMPLEXITY 2 2/24/2025 Brandi Jiang, PT GP 1    53009519877 HC PT NEUROMUSC RE EDUCATION EA 15 MIN 2/25/2025 Brandi Jiang, PT GP 1    89230425788 HC PT THERAPEUTIC ACT EA 15 MIN 2/25/2025 Brandi Jiang, PT GP 1            PT G-Codes  Outcome Measure Options: AM-PAC 6 Clicks Basic Mobility (PT)  AM-PAC 6 Clicks Score (PT): 20  PT Discharge Summary  Anticipated Discharge Disposition (PT): home with outpatient therapy services, skilled nursing facility    Brandi Jiang PT  2/25/2025

## 2025-02-25 NOTE — PLAN OF CARE
Problem: Adult Inpatient Plan of Care  Goal: Plan of Care Review  Outcome: Progressing  Flowsheets (Taken 2/25/2025 6548)  Progress: no change  Outcome Evaluation: Pt vitals stable. BP improved. Pt very anxious about BP. Access re consulted about anxiety per pt request. Pt ambulating in [place] x 2. Pt safety maintained.  Plan of Care Reviewed With: patient

## 2025-02-25 NOTE — PROGRESS NOTES
[facility]    Patient Name: Flako Coreas Sr.  :1965  59 y.o.  LOS: 0  Encounter Provider: Bari Purcell MD      Patient Care Team:  Akash Rodriguez Jr., DO as PCP - General (Sports Medicine)  Karlene Leach MSW as  ([facility]) ([facility])    Chief Complaint/Consult question: Hypertension    PMH/HPI:  59 year old gentleman followed by Dr. Taylor. He has a history of cerebellar stroke with residual ataxia, frequent falls, hypertension, paroxysmal atrial fibrillation and questionable alcohol over use (patient denies). He has not been on anticoagulation due to his falls and frequent syncope. He has a loop recorder in and has a low AF burden.      He was initially seen by cardiology in 2023 when he was hospitalized with hypertensive emergency and acute left cerebellar strokes. Since this he has had multiple episodes of dizziness, near syncope, and syncope. He had a loop recorder placed in 2024. In 2024 he had a syncopal episode while using the bathroom. His loop recorder showed a 13 second pause. This was felt to likely have been vasovagal.      He presented to the ED on 25 with complaints of a syncopal episode which occurred when he got out of bed. He had labile blood pressures during that admission. He was discharged on norvasc 5 mg , carvedilol 3.125 mg BID, and lisinopril 40 mg daily.      He called office on 2/3 worried about his blood pressure. He hadn't been taking the carvedilol. He restarted that but continued to have elevated blood pressures. He missed office follow up appts. He has untreated sleep apnea. Changes and loss of insurance has effected his compliance.      He came to the emergency room yesterday with continued elevated blood pressure readings. Systolics in the 200s. He was intermittently taking hydralazine pills and it wasn't helping. He was given IV labetolol and BP improved to 170/100 so he was released  "home.      Unfortunately he came back several hours later - /135 . He was admitted to the hospitalists. Temporarily  on nicardipine. That has since been stopped. Carvedilol has been increased. Lisinopril and amlodipine continued. Secondary hypertensive work up has been initiated with - aldosterone level/VMA, serotonin ,catecholamines in the urine. Renal artery duplex ok.     Interval History: No acute events though BP remains persistently elevated, and patient is extremely anxious.       Objective   Vital Signs  Temp:  [97.7 °F (36.5 °C)-99.4 °F (37.4 °C)] 99.4 °F (37.4 °C)  Heart Rate:  [50-74] 69  Resp:  [18] 18  BP: (121-153)/() 144/96    Intake/Output Summary (Last 24 hours) at 2/25/2025 1548  Last data filed at 2/25/2025 1500  Gross per 24 hour   Intake 582 ml   Output 700 ml   Net -118 ml     Flowsheet Rows      Flowsheet Row First Filed Value   Admission Height 188 cm (74\") Documented at 02/23/2025 0629   Admission Weight 102 kg (224 lb 13.9 oz) Documented at 02/23/2025 0629              Vitals and nursing note reviewed.   Constitutional:       Appearance: Healthy appearance. Not in distress.   Neck:      Vascular: No JVR.   Pulmonary:      Effort: Pulmonary effort is normal.      Breath sounds: Normal breath sounds. No wheezing. No rhonchi. No rales.   Cardiovascular:      Normal rate. Regular rhythm.      Murmurs: There is no murmur.   Edema:     Peripheral edema absent.   Abdominal:      General: There is no distension.      Palpations: Abdomen is soft.      Tenderness: There is no abdominal tenderness.   Musculoskeletal:      Cervical back: Neck supple. Skin:     General: Skin is cool.   Neurological:      Mental Status: Alert and oriented to person, place and time.   Psychiatric:         Mood and Affect: Mood is anxious.           Pertinent Test Results:  Results from last 7 days   Lab Units 02/25/25  0604 02/24/25  0351 02/23/25  0057   SODIUM mmol/L 142 138 138   POTASSIUM mmol/L 3.8 3.7 " "4.1   CHLORIDE mmol/L 110* 106 107   CO2 mmol/L 21.5* 22.5 21.0*   BUN mg/dL 15 14 15   CREATININE mg/dL 0.80 0.82 0.83   GLUCOSE mg/dL 99 111* 115*   CALCIUM mg/dL 8.8 9.3 9.3   AST (SGOT) U/L  --   --  17   ALT (SGPT) U/L  --   --  22     Results from last 7 days   Lab Units 02/23/25  1051 02/23/25  0952 02/23/25  0057   HSTROP T ng/L <6 <6 <6     Results from last 7 days   Lab Units 02/25/25  0604 02/24/25  0351 02/23/25  0057   WBC 10*3/mm3 8.35 7.65 7.95   HEMOGLOBIN g/dL 15.3 15.5 15.7   HEMATOCRIT % 45.6 46.6 47.2   PLATELETS 10*3/mm3 243 273 236                   Invalid input(s): \"LDLCALC\"      Results from last 7 days   Lab Units 02/23/25  1540   TSH uIU/mL 0.775           Medication Review:   amLODIPine, 5 mg, Oral, Q24H  aspirin, 81 mg, Oral, Daily  atorvastatin, 40 mg, Oral, Nightly  busPIRone, 10 mg, Oral, Q8H  carvedilol, 12.5 mg, Oral, BID With Meals  fluticasone, 2 spray, Each Nare, Daily  folic acid, 1 mg, Oral, Daily  Lidocaine, 2 patch, Transdermal, Q24H  lisinopril, 40 mg, Oral, Daily  multivitamin with minerals, 1 tablet, Oral, Daily  pantoprazole, 40 mg, Oral, QAM AC  senna-docusate sodium, 2 tablet, Oral, BID  sodium chloride, 10 mL, Intravenous, Q12H  spironolactone, 25 mg, Oral, Daily  thiamine, 100 mg, Oral, Daily  vilazodone, 40 mg, Oral, Daily              Assessment & Plan     Active Hospital Problems    Diagnosis  POA    **Hypertensive urgency [I16.0]  Yes    History of CVA (cerebrovascular accident) [Z86.73]  Not Applicable    Hyperglycemia [R73.9]  Yes    Peptic ulcer disease [K27.9]  Yes    History of diverticulosis [Z87.19]  Not Applicable    History of atrial fibrillation [Z86.79]  Not Applicable    Mood disorder [F39]  Yes    Alcohol abuse [F10.10]  Yes    History of vertebral artery stenosis [Z86.79]  Not Applicable    Lepe's esophagus [K22.70]  Yes    GERD (gastroesophageal reflux disease) [K21.9]  Yes    Essential hypertension [I10]  Yes    Hyperlipidemia [E78.5]  Yes    "   Resolved Hospital Problems   No resolved problems to display.        Hypertension/hypertensive urgency: BP persistently elevated despite multiple antihypertensive agents.  He is undergoing evaluation for secondary causes of hypertension, specifically awaiting urine catecholamine and 5 HIAA, aldosterone/renin ratio.  TSH and renal duplex were both within normal limits.  He is currently on lisinopril 40 daily, carvedilol 12.5 bid, spironolactone 25 daily, and his amlodipine is getting uptitrated to 10 daily.  Patient is extremely anxious and I suspect this is at least 1 main factor driving up his BP despite very aggressive medical therapy.  Recurrent syncope, loop recorder in place.   History of left cerebellar CVA August 2023: BP control, see above.  Paroxysmal atrial fibrillation, low burden. AC previously stopped risk >> benefit due to falls and syncope, continue home aspirin 81 daily.  Untreated sleep apnea - CPAP has been recommended in the past. Changes and loss of insurance affected his compliance with follow up for this.   Hyperlipidemia/coronary atherosclerosis: Recent CTA chest showed evidence of coronary calcification.  Continue atorvastatin 40 qhs.  Anxiety: This appears completely uncontrolled, appreciate psychiatry input and management.     Bari Purcell MD  [facility]  02/25/25  15:48 EST

## 2025-02-25 NOTE — CONSULTS
IDENTIFYING INFORMATION: The patient is a 59-year-old white male admitted with ongoing hypertension.  He is seen by psychiatry related to history of alcohol use and depression    CHIEF COMPLAINT: None given    INFORMANT: Patient and chart    RELIABILITY: Good    HISTORY OF PRESENT ILLNESS: The patient is a 59-year-old white male admitted with ongoing hypertension.  He was seen by this physician in January of this year when he had required treatment for what was thought to be an alcohol-induced delirium.  He is currently prescribed Viibryd 40 mg daily, though he believes that he is taking Abilify.  The chart does not reflect this, however.  The patient reports that his mood has been fairly stable and he minimizes his recent use of alcohol.  For more complete history of present illness please refer to previous dictated notes    PAST PSYCHIATRIC HISTORY: Reviewed no changes    PAST MEDICAL HISTORY: Reviewed no changes    MEDICATIONS:   Current Facility-Administered Medications   Medication Dose Route Frequency Provider Last Rate Last Admin    acetaminophen (TYLENOL) tablet 650 mg  650 mg Oral Q4H PRN Ester Bright MD   650 mg at 02/25/25 1229    Or    acetaminophen (TYLENOL) 160 MG/5ML oral solution 650 mg  650 mg Oral Q4H PRN Ester Bright MD        Or    acetaminophen (TYLENOL) suppository 650 mg  650 mg Rectal Q4H PRN Ester Bright MD        amLODIPine (NORVASC) tablet 5 mg  5 mg Oral Q24H Ester Bright MD   5 mg at 02/25/25 0815    aspirin EC tablet 81 mg  81 mg Oral Daily Ester Bright MD   81 mg at 02/25/25 0815    atorvastatin (LIPITOR) tablet 40 mg  40 mg Oral Nightly Ester Bright MD   40 mg at 02/24/25 2043    sennosides-docusate (PERICOLACE) 8.6-50 MG per tablet 2 tablet  2 tablet Oral BID Ester Bright MD   2 tablet at 02/25/25 0816    And    polyethylene glycol (MIRALAX) packet 17 g  17 g Oral Daily PRN Ester Bright MD        And    bisacodyl (DULCOLAX) EC tablet 5 mg  5 mg  Oral Daily PRN Ester Bright MD        And    bisacodyl (DULCOLAX) suppository 10 mg  10 mg Rectal Daily PRN Ester Bright MD        busPIRone (BUSPAR) tablet 10 mg  10 mg Oral Q8H Ester Bright MD   10 mg at 02/25/25 0628    Calcium Replacement - Follow Nurse / BPA Driven Protocol   Not Applicable PRN Ester Bright MD        carvedilol (COREG) tablet 12.5 mg  12.5 mg Oral BID With Meals Ester Bright MD   12.5 mg at 02/25/25 0815    fluticasone (FLONASE) 50 MCG/ACT nasal spray 2 spray  2 spray Each Nare Daily Marcy Hillman APRN   2 spray at 02/25/25 0822    folic acid (FOLVITE) tablet 1 mg  1 mg Oral Daily Ester Bright MD   1 mg at 02/25/25 0816    Lidocaine 4 % 2 patch  2 patch Transdermal Q24H Steffen Preciado APRN   2 patch at 02/25/25 0814    lisinopril (PRINIVIL,ZESTRIL) tablet 40 mg  40 mg Oral Daily Ester Bright MD   40 mg at 02/25/25 0819    Magnesium Standard Dose Replacement - Follow Nurse / BPA Driven Protocol   Not Applicable Ester Martinez MD        melatonin tablet 5 mg  5 mg Oral Nightly PRN Ester Bright MD   5 mg at 02/23/25 2200    multivitamin with minerals 1 tablet  1 tablet Oral Daily Ester Bright MD   1 tablet at 02/25/25 0815    nitroglycerin (NITROSTAT) SL tablet 0.4 mg  0.4 mg Sublingual Q5 Min PRN Ester Bright MD        ondansetron ODT (ZOFRAN-ODT) disintegrating tablet 4 mg  4 mg Oral Q6H PRN Ester Bright MD        Or    ondansetron (ZOFRAN) injection 4 mg  4 mg Intravenous Q6H PRN Ester Bright MD        pantoprazole (PROTONIX) EC tablet 40 mg  40 mg Oral QAM AC Ester Bright MD   40 mg at 02/25/25 0630    Phosphorus Replacement - Follow Nurse / BPA Driven Protocol   Not Applicable PREster Wick MD        Potassium Replacement - Follow Nurse / BPA Driven Protocol   Not Applicable Ester Martinez MD        sodium chloride 0.9 % flush 10 mL  10 mL Intravenous Q12H Ester Bright MD   10 mL at  02/25/25 0822    sodium chloride 0.9 % flush 10 mL  10 mL Intravenous PRN Ester Bright MD        sodium chloride 0.9 % infusion 40 mL  40 mL Intravenous PRN Ester Bright MD        spironolactone (ALDACTONE) tablet 25 mg  25 mg Oral Daily Gin Barron APRTAMEKA   25 mg at 02/25/25 0815    thiamine (VITAMIN B-1) tablet 100 mg  100 mg Oral Daily Ester Bright MD   100 mg at 02/25/25 0816    vilazodone (VIIBRYD) tablet 40 mg  40 mg Oral Daily Ester Bright MD   40 mg at 02/25/25 0815         ALLERGIES: None    FAMILY HISTORY: Reviewed no changes    SOCIAL HISTORY: Reviewed no changes    MENTAL STATUS EXAM: Patient is a well-developed well-nourished white male appearing his stated age.  He has no apparent physical distress at the time of the examination.  He is awake alert and oriented all spheres.  His mood is euthymic his affect congruent.  Speech is generally relevant and coherent.  There are no gross deficits in memory or cognition noted.  Intelligence is judged to be in the average range based on fund of knowledge the patient is cooperative throughout the interview.  He denies current suicidal homicidal or psychotic features.  Judgement and insight are reasonably intact.    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  His mood seems stable and I would recommend he continue on this medication with follow-up with previous psychiatric providers.  Little else to add, I will sign off.

## 2025-02-25 NOTE — CONSULTS
Patient Name: Flako Coreas Sr.  :1965  59 y.o.    Date of Admission: 2025  Date of Consultation:  25  Encounter Provider: JANEEN Lara  Place of Service: [facility] CARDIOLOGY  Referring Provider: No ref. provider found  Patient Care Team:  Akash Rodriguez Jr.,  as PCP - General (Sports Medicine)  Karlene Leach MSW as  ([facility]) ([facility])      Chief complaint:    History of Present Illness:  Mr. Coreas is a 59 year old gentleman followed by Dr. Taylor. He has a history of cerebellar stroke with residual ataxia, frequent falls, hypertension, paroxysmal atrial fibrillation and questionable alcohol over use (patient denies). He has not been on anticoagulation due to his falls and frequent syncope. He has a loop recorder in and has a low AF burden.     He was initially seen by cardiology in 2023 when he was hospitalized with hypertensive emergency and acute left cerebellar strokes. Since this he has had multiple episodes of dizziness, near syncope, and syncope. He had a loop recorder placed in 2024. In 2024 he had a syncopal episode while using the bathroom. His loop recorder showed a 13 second pause. This was felt to likely have been vasovagal.      He presented to the ED on 25 with complaints of a syncopal episode which occurred when he got out of bed. He had labile blood pressures during that admission. He was discharged on norvasc 5 mg , carvedilol 3.125 mg BID, and lisinopril 40 mg daily.     He called office on 2/3 worried about his blood pressure. He hadn't been taking the carvedilol. He restarted that but continued to have elevated blood pressures. He missed office follow up appts. He has untreated sleep apnea. Changes and loss of insurance has effected his compliance.     He came to the emergency room yesterday with continued elevated blood pressure readings. Systolics in the 200s.  He was intermittently taking hydralazine pills and it wasn't helping. He was given IV labetolol and BP improved to 170/100 so he was released home.     Unfortunately he came back several hours later - /135 . He was admitted to the hospitalists. Temporarily  on nicardipine. That has since been stopped. Carvedilol has been increased. Lisinopril and amlodipine continued. Secondary hypertensive work up has been initiated with - aldosterone level/VMA, serotonin ,catecholamines in the urine. Renal artery duplex ok.     He is feeling better. BP is better.     Echo 1/26/25    Left ventricular ejection fraction appears to be 56 - 60%.    The left ventricular cavity is borderline dilated.    Left ventricular diastolic function was normal.    Estimated right ventricular systolic pressure from tricuspid regurgitation is normal (<35 mmHg).    Past Medical History:   Diagnosis Date    ADHD (attention deficit hyperactivity disorder)     On going issue    Anxiety     Lepe's esophagus     Benign prostatic hyperplasia Surgery in 12/2021    Brain concussion 11/2023    Clotting disorder Intestinal    Depression     Diverticulitis     Diverticulosis     Duodenitis     Enteritis     Failure to thrive (child)     Family history of blood clots     Fracture of wrist 1985    Fracture, foot 2019    GERD (gastroesophageal reflux disease)     Hyperlipidemia     Hypertension     Hypertensive emergency     Irritable bowel syndrome 2017    Low testosterone     Microcytosis     Pancreatitis     Peptic ulcer disease 2/23/2025    Pneumonia     PONV (postoperative nausea and vomiting)     Seasonal affective disorder     Shoulder injury     Stroke     Unexplained weight loss     Visual impairment 8/2023       Past Surgical History:   Procedure Laterality Date    CARDIAC ELECTROPHYSIOLOGY PROCEDURE N/A 01/24/2024    Procedure: Loop insertion- [employer] LINQ;  Surgeon: Kaela Walker MD;  Location: [facility] INVASIVE LOCATION;  Service:  Cardiovascular;  Laterality: N/A;    COLON SURGERY      COLONOSCOPY      COLONOSCOPY N/A 12/11/2022    Procedure: COLONOSCOPY INTO CECUM WITH HOT SNARE POLYPECTOMY;  Surgeon: Albert Gallo MD;  Location:  [facility];  Service: Gastroenterology;  Laterality: N/A;  PRE- GI BLEED  POST- DIVERTICULOSIS, POLYP    COLONOSCOPY N/A 02/14/2024    Procedure: COLONOSCOPY into cecum/terminal ileum;  Surgeon: Albert Gallo MD;  Location:  [facility];  Service: Gastroenterology;  Laterality: N/A;  diverticulosis, hemorrhoids    ENDOSCOPY N/A 12/10/2022    Procedure: ESOPHAGOGASTRODUODENOSCOPY;  Surgeon: Albert Gallo MD;  Location:  [facility];  Service: Gastroenterology;  Laterality: N/A;  PRE- DARK STOOLS  POST- BARRETTS ESOPHAGUS    ENDOSCOPY N/A 02/14/2024    Procedure: ESOPHAGOGASTRODUODENOSCOPY with biopsy;  Surgeon: Albert Gallo MD;  Location:  [facility];  Service: Gastroenterology;  Laterality: N/A;  long segment wilson's, hiatal hernia    FRACTURE SURGERY Right 1980    for broken arm    FRACTURE SURGERY Right     for broken hand    HAND SURGERY  1990    INCISION AND DRAINAGE ABSCESS  2015    anal    PROSTATE SURGERY      SHOULDER SURGERY  2022    SMALL INTESTINE SURGERY      TONSILLECTOMY      TRIGGER POINT INJECTION  2017         Prior to Admission medications    Medication Sig Start Date End Date Taking? Authorizing Provider   amLODIPine (NORVASC) 5 MG tablet Take 1 tablet by mouth Daily. 2/3/25  Yes Sebastian Gonsalez MD   ASPIRIN 81 PO Take 81 mg by mouth Daily.   Yes Ovidio Hernandes MD   atorvastatin (LIPITOR) 40 MG tablet Take 1 tablet by mouth Every Night. 2/2/25  Yes Sebastian Gonsalez MD   carvedilol (COREG) 3.125 MG tablet Take 1 tablet by mouth 2 (Two) Times a Day With Meals. Pt unsure of dose   Yes Ovidio Hernandes MD   diazePAM (Valium) 2 MG tablet Take 1 tablet by mouth Every 12 (Twelve) Hours As Needed for Anxiety. 2/2/25  Yes Sebastian Gonsalez MD    folic acid (FOLVITE) 1 MG tablet Take 1 tablet by mouth Daily. 2/3/25  Yes Sebastian Gonsalez MD   lisinopril (PRINIVIL,ZESTRIL) 40 MG tablet Take 1 tablet by mouth Daily for 30 doses. 2/18/25 3/20/25 Yes Akash Rodriguez Jr., DO   multivitamin with minerals tablet tablet Take 1 tablet by mouth Daily. 2/3/25  Yes Sebastian Gonsalez MD   pantoprazole (PROTONIX) 40 MG EC tablet Take 1 tablet by mouth Every Morning Before Breakfast. 2/3/25  Yes Sebastian Gonsalez MD   thiamine (VITAMIN B1) 100 MG tablet Take 1 tablet by mouth Daily. 2/3/25  Yes Sebastian Gonsalez MD   vilazodone (Viibryd) 40 MG tablet tablet Take 1 tablet by mouth Daily. 9/18/23  Yes Akash Rodriguez Jr., DO   vitamin B-12 (CYANOCOBALAMIN) 500 MCG tablet Take 1 tablet by mouth Daily.  Patient not taking: Reported on 7/3/2024 4/22/24   Akash Rodriguez Jr., DO       No Active Allergies    Social History     Socioeconomic History    Marital status: Single   Tobacco Use    Smoking status: Never    Smokeless tobacco: Never   Vaping Use    Vaping status: Never Used   Substance and Sexual Activity    Alcohol use: Not Currently    Drug use: Not Currently     Frequency: 1.0 times per week    Sexual activity: Not Currently     Partners: Female     Birth control/protection: Other, Birth control pill       Family History   Problem Relation Age of Onset    Stroke Mother     Heart disease Mother     Stroke Father     Hyperlipidemia Father     Hypertension Father        REVIEW OF SYSTEMS:   All systems reviewed.  Pertinent positives identified in HPI.  All other systems are negative.      Objective:     Vitals:    02/24/25 1500 02/24/25 1600 02/24/25 1800 02/24/25 1954   BP: 156/83 147/100 (!) 153/103    BP Location:       Patient Position:       Pulse: 57 60 64 59   Resp:       Temp:    97.7 °F (36.5 °C)   TempSrc:    Oral   SpO2: 94% 94% 95% 93%   Weight:       Height:         Body mass index is 28.87 kg/m².    Physical  Exam:  Constitutional: He is oriented to person, place, and time. He appears well-developed. He does not appear ill.   HENT:   Head: Normocephalic and atraumatic. Head is without contusion.   Right Ear: Hearing normal. No drainage.   Left Ear: Hearing normal. No drainage.   Nose: No nasal deformity. No epistaxis.   Eyes: Lids are normal. Right eye exhibits no exudate. Left eye exhibits no exudate.  Neck: No JVD present. Carotid bruit is not present. No tracheal deviation present. No thyroid mass and no thyromegaly present.   Cardiovascular: Normal rate, regular rhythm and normal heart sounds.    Pulses:       Posterior tibial pulses are 2+ on the right side, and 2+ on the left side.   Pulmonary/Chest: Effort normal and breath sounds normal.   Abdominal: Soft. Normal appearance and bowel sounds are normal. There is no tenderness.   Musculoskeletal: Normal range of motion.        Right shoulder: He exhibits no deformity.        Left shoulder: He exhibits no deformity.   Neurological: He is alert and oriented to person, place, and time. He has normal strength.   Skin: Skin is warm, dry and intact. No rash noted.   Psychiatric: He has a normal mood and affect. His behavior is normal. Thought content normal.   Vitals reviewed      Lab Review:     Results from last 7 days   Lab Units 02/24/25  0351 02/23/25  0057   SODIUM mmol/L 138 138   POTASSIUM mmol/L 3.7 4.1   CHLORIDE mmol/L 106 107   CO2 mmol/L 22.5 21.0*   BUN mg/dL 14 15   CREATININE mg/dL 0.82 0.83   CALCIUM mg/dL 9.3 9.3   BILIRUBIN mg/dL  --  0.4   ALK PHOS U/L  --  75   ALT (SGPT) U/L  --  22   AST (SGOT) U/L  --  17   GLUCOSE mg/dL 111* 115*     Results from last 7 days   Lab Units 02/23/25  1051 02/23/25  0952 02/23/25  0057   HSTROP T ng/L <6 <6 <6     Results from last 7 days   Lab Units 02/24/25  0351   WBC 10*3/mm3 7.65   HEMOGLOBIN g/dL 15.5   HEMATOCRIT % 46.6   PLATELETS 10*3/mm3 273               Current Facility-Administered Medications:      acetaminophen (TYLENOL) tablet 650 mg, 650 mg, Oral, Q4H PRN, 650 mg at 02/24/25 0931 **OR** acetaminophen (TYLENOL) 160 MG/5ML oral solution 650 mg, 650 mg, Oral, Q4H PRN **OR** acetaminophen (TYLENOL) suppository 650 mg, 650 mg, Rectal, Q4H PRN, Ester Bright MD    amLODIPine (NORVASC) tablet 5 mg, 5 mg, Oral, Q24H, Ester Bright MD, 5 mg at 02/24/25 1319    aspirin EC tablet 81 mg, 81 mg, Oral, Daily, Ester Bright MD, 81 mg at 02/24/25 0931    atorvastatin (LIPITOR) tablet 40 mg, 40 mg, Oral, Nightly, Ester Bright MD    sennosides-docusate (PERICOLACE) 8.6-50 MG per tablet 2 tablet, 2 tablet, Oral, BID, 2 tablet at 02/24/25 0931 **AND** polyethylene glycol (MIRALAX) packet 17 g, 17 g, Oral, Daily PRN **AND** bisacodyl (DULCOLAX) EC tablet 5 mg, 5 mg, Oral, Daily PRN **AND** bisacodyl (DULCOLAX) suppository 10 mg, 10 mg, Rectal, Daily PRN, Ester Bright MD    busPIRone (BUSPAR) tablet 10 mg, 10 mg, Oral, Q8H, Ester Bright MD, 10 mg at 02/24/25 1319    Calcium Replacement - Follow Nurse / BPA Driven Protocol, , Not Applicable, PRN, Ester Bright MD    carvedilol (COREG) tablet 12.5 mg, 12.5 mg, Oral, BID With Meals, Ester Bright MD, 12.5 mg at 02/24/25 3749    fluticasone (FLONASE) 50 MCG/ACT nasal spray 2 spray, 2 spray, Each Nare, Daily, Marcy Hillman APRN    folic acid (FOLVITE) tablet 1 mg, 1 mg, Oral, Daily, Ester Bright MD, 1 mg at 02/24/25 0931    Lidocaine 4 % 2 patch, 2 patch, Transdermal, Q24H, Steffen Preciado APRN, 2 patch at 02/23/25 2136    lisinopril (PRINIVIL,ZESTRIL) tablet 40 mg, 40 mg, Oral, Daily, Ester Bright MD, 40 mg at 02/24/25 0931    Magnesium Standard Dose Replacement - Follow Nurse / BPA Driven Protocol, , Not Applicable, PRN, Ester Bright MD    melatonin tablet 5 mg, 5 mg, Oral, Nightly PRN, Ester Bright MD, 5 mg at 02/23/25 2200    multivitamin with minerals 1 tablet, 1 tablet, Oral, Daily, Ester Bright MD, 1 tablet  at 02/24/25 0931    nitroglycerin (NITROSTAT) SL tablet 0.4 mg, 0.4 mg, Sublingual, Q5 Min PRN, Ester Bright MD    ondansetron ODT (ZOFRAN-ODT) disintegrating tablet 4 mg, 4 mg, Oral, Q6H PRN **OR** ondansetron (ZOFRAN) injection 4 mg, 4 mg, Intravenous, Q6H PRN, Ester Bright MD    pantoprazole (PROTONIX) EC tablet 40 mg, 40 mg, Oral, QAM AC, Ester Bright MD, 40 mg at 02/24/25 0545    Phosphorus Replacement - Follow Nurse / BPA Driven Protocol, , Not Applicable, PRDeangelo ENGLISH Samer H, MD    Potassium Replacement - Follow Nurse / BPA Driven Protocol, , Not Applicable, PRTAMEKA, Ester Bright MD    sodium chloride 0.9 % flush 10 mL, 10 mL, Intravenous, Q12H, Ester Bright MD, 10 mL at 02/24/25 0932    sodium chloride 0.9 % flush 10 mL, 10 mL, Intravenous, PRN, Ester Bright MD    sodium chloride 0.9 % infusion 40 mL, 40 mL, Intravenous, PRN, Ester Bright MD    thiamine (VITAMIN B-1) tablet 100 mg, 100 mg, Oral, Daily, Ester Bright MD, 100 mg at 02/24/25 0931    vilazodone (VIIBRYD) tablet 40 mg, 40 mg, Oral, Daily, Ester Bright MD, 40 mg at 02/24/25 0931    Assessment and Plan:       Hypertensive urgency   Recurrent syncope, loop recorder in place.   History of left cerebellar CVA August 2023  Paroxysmal atrial fibrillation, low burden. AC stopped risk >> benefit due to falls and syncope  Untreated sleep apnea - CPAP has been recommended in the past. Changes and loss of insurance affected his compliance with follow up for this. I stress the importance of treating this and how is is linked to #1.   Hyperlipidemia     This has been a long standing problem for him.   Secondary hypertension work up underway.     He has been on numerous antiHTNs in the past.     He was on spironolactone in the past. Per records it looked like this worked well. I will restart and follow his BP trends.       Gin Barron, APRN  02/24/25  20:17 EST

## 2025-02-25 NOTE — PLAN OF CARE
Goal Outcome Evaluation:           Progress: no change  Outcome Evaluation: Treatment continued for elevated blood pressure. Pt VSS. Systolic running in 120s'-150's. Diastolic 70's-90's. Room air. PRN tylenol given for L. hip pain. x1 assist. Pt safety maintained.

## 2025-02-25 NOTE — CONSULTS
"[facility] evaluated 59-year-old male for anxiety.  Patient was last evaluated by [facility] on 1/29/2025.  Patient has had several strokes in the past and feels like he has a hard time \"connecting the dots\" since then.  Patient had a string of losses that have created a lot of depression and anxiety for patient.  Patient is currently on Viibryd and was seen by the psychiatrist.  Patient is also taking Atarax since being in the hospital which he states helped his anxiety but he felt a little sedated from it.  [facility] told patient he can stick with this dose and see if his body gets adjusted or if he would like to have them lower it to let his nurse know.  Patient denies SI and denies history of attempts.  Patient rates his anxiety level after having the Atarax as a 3 or 4/10 and before the Atarax as a 9/10.  Patient rates his depression as a 10/10.  Patient has not been able to follow up with the outpatient counseling resources given to him as well as psychiatrist resources due to not having insurance.  PCP is working with the patient regarding his insurance.  Patient forgot what kind of insurance it was.  Patient states he has had no alcohol since 1/25/2025 and states he does not feel that his symptoms in January were from alcohol withdrawal.  Patient denies drug use and tobacco use.  Patient states his sleep is poor but he is hopeful he is starting to use a CPAP in the near future.  Patient states his appetite is been poor in the hospital and somewhat limited at home.  Patient lives by himself in an apartment and continues to get support from his son and several friends.  [facility] will follow and make sure he has copies of his previous psychiatrist and counseling outpatient resources.  "

## 2025-02-26 LAB
ANION GAP SERPL CALCULATED.3IONS-SCNC: 9.5 MMOL/L (ref 5–15)
BASOPHILS # BLD AUTO: 0.03 10*3/MM3 (ref 0–0.2)
BASOPHILS NFR BLD AUTO: 0.4 % (ref 0–1.5)
BUN SERPL-MCNC: 14 MG/DL (ref 6–20)
BUN/CREAT SERPL: 16.3 (ref 7–25)
CALCIUM SPEC-SCNC: 8.7 MG/DL (ref 8.6–10.5)
CHLORIDE SERPL-SCNC: 111 MMOL/L (ref 98–107)
CO2 SERPL-SCNC: 20.5 MMOL/L (ref 22–29)
CREAT SERPL-MCNC: 0.86 MG/DL (ref 0.76–1.27)
DEPRECATED RDW RBC AUTO: 44 FL (ref 37–54)
EGFRCR SERPLBLD CKD-EPI 2021: 101 ML/MIN/1.73
EOSINOPHIL # BLD AUTO: 0.64 10*3/MM3 (ref 0–0.4)
EOSINOPHIL NFR BLD AUTO: 7.8 % (ref 0.3–6.2)
ERYTHROCYTE [DISTWIDTH] IN BLOOD BY AUTOMATED COUNT: 13.9 % (ref 12.3–15.4)
GLUCOSE SERPL-MCNC: 107 MG/DL (ref 65–99)
HCT VFR BLD AUTO: 46.2 % (ref 37.5–51)
HGB BLD-MCNC: 15.1 G/DL (ref 13–17.7)
IMM GRANULOCYTES # BLD AUTO: 0.02 10*3/MM3 (ref 0–0.05)
IMM GRANULOCYTES NFR BLD AUTO: 0.2 % (ref 0–0.5)
LYMPHOCYTES # BLD AUTO: 1.96 10*3/MM3 (ref 0.7–3.1)
LYMPHOCYTES NFR BLD AUTO: 24 % (ref 19.6–45.3)
MCH RBC QN AUTO: 28.3 PG (ref 26.6–33)
MCHC RBC AUTO-ENTMCNC: 32.7 G/DL (ref 31.5–35.7)
MCV RBC AUTO: 86.5 FL (ref 79–97)
MONOCYTES # BLD AUTO: 0.75 10*3/MM3 (ref 0.1–0.9)
MONOCYTES NFR BLD AUTO: 9.2 % (ref 5–12)
NEUTROPHILS NFR BLD AUTO: 4.77 10*3/MM3 (ref 1.7–7)
NEUTROPHILS NFR BLD AUTO: 58.4 % (ref 42.7–76)
NRBC BLD AUTO-RTO: 0 /100 WBC (ref 0–0.2)
PLATELET # BLD AUTO: 228 10*3/MM3 (ref 140–450)
PMV BLD AUTO: 10 FL (ref 6–12)
POTASSIUM SERPL-SCNC: 3.2 MMOL/L (ref 3.5–5.2)
POTASSIUM SERPL-SCNC: 3.5 MMOL/L (ref 3.5–5.2)
RBC # BLD AUTO: 5.34 10*6/MM3 (ref 4.14–5.8)
SODIUM SERPL-SCNC: 141 MMOL/L (ref 136–145)
WBC NRBC COR # BLD AUTO: 8.17 10*3/MM3 (ref 3.4–10.8)

## 2025-02-26 PROCEDURE — G0378 HOSPITAL OBSERVATION PER HR: HCPCS

## 2025-02-26 PROCEDURE — 99214 OFFICE O/P EST MOD 30 MIN: CPT | Performed by: NURSE PRACTITIONER

## 2025-02-26 PROCEDURE — 80048 BASIC METABOLIC PNL TOTAL CA: CPT | Performed by: INTERNAL MEDICINE

## 2025-02-26 PROCEDURE — 85025 COMPLETE CBC W/AUTO DIFF WBC: CPT | Performed by: INTERNAL MEDICINE

## 2025-02-26 PROCEDURE — 97112 NEUROMUSCULAR REEDUCATION: CPT

## 2025-02-26 PROCEDURE — 84132 ASSAY OF SERUM POTASSIUM: CPT | Performed by: INTERNAL MEDICINE

## 2025-02-26 PROCEDURE — 97530 THERAPEUTIC ACTIVITIES: CPT

## 2025-02-26 RX ORDER — SPIRONOLACTONE 25 MG/1
50 TABLET ORAL DAILY
Status: DISCONTINUED | OUTPATIENT
Start: 2025-02-27 | End: 2025-02-27 | Stop reason: HOSPADM

## 2025-02-26 RX ORDER — SPIRONOLACTONE 25 MG/1
25 TABLET ORAL ONCE
Status: COMPLETED | OUTPATIENT
Start: 2025-02-26 | End: 2025-02-26

## 2025-02-26 RX ORDER — SPIRONOLACTONE 25 MG/1
TABLET ORAL
Status: COMPLETED
Start: 2025-02-26 | End: 2025-02-26

## 2025-02-26 RX ORDER — POTASSIUM CHLORIDE 1500 MG/1
40 TABLET, EXTENDED RELEASE ORAL EVERY 4 HOURS
Status: COMPLETED | OUTPATIENT
Start: 2025-02-26 | End: 2025-02-26

## 2025-02-26 RX ORDER — POTASSIUM CHLORIDE 1500 MG/1
40 TABLET, EXTENDED RELEASE ORAL EVERY 4 HOURS
Status: CANCELLED | OUTPATIENT
Start: 2025-02-26 | End: 2025-02-26

## 2025-02-26 RX ADMIN — POTASSIUM CHLORIDE 40 MEQ: 1500 TABLET, EXTENDED RELEASE ORAL at 08:01

## 2025-02-26 RX ADMIN — ACETAMINOPHEN 650 MG: 325 TABLET, FILM COATED ORAL at 12:15

## 2025-02-26 RX ADMIN — LISINOPRIL 40 MG: 20 TABLET ORAL at 08:01

## 2025-02-26 RX ADMIN — SPIRONOLACTONE 25 MG: 25 TABLET ORAL at 11:10

## 2025-02-26 RX ADMIN — POTASSIUM CHLORIDE 40 MEQ: 1500 TABLET, EXTENDED RELEASE ORAL at 12:15

## 2025-02-26 RX ADMIN — ASPIRIN 81 MG: 81 TABLET, DELAYED RELEASE ORAL at 08:01

## 2025-02-26 RX ADMIN — POTASSIUM CHLORIDE 40 MEQ: 1500 TABLET, EXTENDED RELEASE ORAL at 18:18

## 2025-02-26 RX ADMIN — Medication 100 MG: at 08:01

## 2025-02-26 RX ADMIN — BUSPIRONE HYDROCHLORIDE 10 MG: 10 TABLET ORAL at 06:36

## 2025-02-26 RX ADMIN — LIDOCAINE 2 PATCH: 4 PATCH TOPICAL at 08:02

## 2025-02-26 RX ADMIN — BUSPIRONE HYDROCHLORIDE 10 MG: 10 TABLET ORAL at 14:31

## 2025-02-26 RX ADMIN — AMLODIPINE BESYLATE 10 MG: 10 TABLET ORAL at 08:01

## 2025-02-26 RX ADMIN — SENNOSIDES AND DOCUSATE SODIUM 2 TABLET: 50; 8.6 TABLET ORAL at 08:01

## 2025-02-26 RX ADMIN — Medication 10 ML: at 20:31

## 2025-02-26 RX ADMIN — PANTOPRAZOLE SODIUM 40 MG: 40 TABLET, DELAYED RELEASE ORAL at 06:36

## 2025-02-26 RX ADMIN — ACETAMINOPHEN 650 MG: 325 TABLET, FILM COATED ORAL at 08:01

## 2025-02-26 RX ADMIN — Medication 10 ML: at 08:02

## 2025-02-26 RX ADMIN — POTASSIUM CHLORIDE 40 MEQ: 1500 TABLET, EXTENDED RELEASE ORAL at 22:12

## 2025-02-26 RX ADMIN — SPIRONOLACTONE 25 MG: 25 TABLET ORAL at 10:17

## 2025-02-26 RX ADMIN — CARVEDILOL 12.5 MG: 12.5 TABLET, FILM COATED ORAL at 17:24

## 2025-02-26 RX ADMIN — VILAZODONE HYDROCHLORIDE 40 MG: 40 TABLET, FILM COATED ORAL at 08:01

## 2025-02-26 RX ADMIN — FLUTICASONE PROPIONATE 2 SPRAY: 50 SPRAY, METERED NASAL at 08:02

## 2025-02-26 RX ADMIN — CARVEDILOL 12.5 MG: 12.5 TABLET, FILM COATED ORAL at 08:02

## 2025-02-26 RX ADMIN — SENNOSIDES AND DOCUSATE SODIUM 2 TABLET: 50; 8.6 TABLET ORAL at 20:31

## 2025-02-26 RX ADMIN — ATORVASTATIN CALCIUM 40 MG: 20 TABLET, FILM COATED ORAL at 20:31

## 2025-02-26 RX ADMIN — BUSPIRONE HYDROCHLORIDE 10 MG: 10 TABLET ORAL at 22:12

## 2025-02-26 RX ADMIN — Medication 1 TABLET: at 08:01

## 2025-02-26 RX ADMIN — FOLIC ACID 1 MG: 1 TABLET ORAL at 08:01

## 2025-02-26 NOTE — PROGRESS NOTES
[facility] did follow up with pt. Pt was feeling improved and less anxious today. Access gave pt list of outpt counseling resources. [facility] will continue to follow.

## 2025-02-26 NOTE — PROGRESS NOTES
"    Patient Name: Flako Coreas Sr.  :1965  59 y.o.      Patient Care Team:  Akash Rodriguez Jr., DO as PCP - General (Sports Medicine)  Karlene Leach MSW as  (Amb Case Mgmt) (Population Health)    Chief Complaint: hypertension     Interval History: he is feeling better. Still anxious about everything. BP trends are improving. Particularly diastolic still high.        Objective   Vital Signs  Temp:  [97.7 °F (36.5 °C)-99.4 °F (37.4 °C)] 98.8 °F (37.1 °C)  Heart Rate:  [54-89] 72  Resp:  [18] 18  BP: (132-180)/() 139/97    Intake/Output Summary (Last 24 hours) at 2025 1236  Last data filed at 2025 0500  Gross per 24 hour   Intake 360 ml   Output 450 ml   Net -90 ml     Flowsheet Rows      Flowsheet Row First Filed Value   Admission Height 188 cm (74\") Documented at 2025 0629   Admission Weight 102 kg (224 lb 13.9 oz) Documented at 2025 0629            Physical Exam:   General Appearance:    Alert, cooperative, in no acute distress   Lungs:     Clear to auscultation.  Normal respiratory effort and rate.      Heart:    Regular rhythm and normal rate, normal S1 and S2, no murmurs, gallops or rubs.     Chest Wall:    No abnormalities observed   Abdomen:     Soft, nontender, positive bowel sounds.     Extremities:   no cyanosis, clubbing or edema.  No marked joint deformities.  Adequate musculoskeletal strength.       Results Review:    Results from last 7 days   Lab Units 25  0350   SODIUM mmol/L 141   POTASSIUM mmol/L 3.2*   CHLORIDE mmol/L 111*   CO2 mmol/L 20.5*   BUN mg/dL 14   CREATININE mg/dL 0.86   GLUCOSE mg/dL 107*   CALCIUM mg/dL 8.7     Results from last 7 days   Lab Units 25  1051 25  0952 25  0057   HSTROP T ng/L <6 <6 <6     Results from last 7 days   Lab Units 25  0350   WBC 10*3/mm3 8.17   HEMOGLOBIN g/dL 15.1   HEMATOCRIT % 46.2   PLATELETS 10*3/mm3 228                           Medication Review:   amLODIPine, 10 " mg, Oral, Q24H  aspirin, 81 mg, Oral, Daily  atorvastatin, 40 mg, Oral, Nightly  busPIRone, 10 mg, Oral, Q8H  carvedilol, 12.5 mg, Oral, BID With Meals  fluticasone, 2 spray, Each Nare, Daily  folic acid, 1 mg, Oral, Daily  Lidocaine, 2 patch, Transdermal, Q24H  lisinopril, 40 mg, Oral, Daily  multivitamin with minerals, 1 tablet, Oral, Daily  pantoprazole, 40 mg, Oral, QAM AC  senna-docusate sodium, 2 tablet, Oral, BID  sodium chloride, 10 mL, Intravenous, Q12H  [START ON 2/27/2025] spironolactone, 50 mg, Oral, Daily  thiamine, 100 mg, Oral, Daily  vilazodone, 40 mg, Oral, Daily              Assessment & Plan      Hypertensive urgency  , secondary work up underway. Renal artery duplex ok.   Recurrent syncope, loop recorder in place.   History of left cerebellar CVA August 2023  Paroxysmal atrial fibrillation, low burden. AC stopped risk >> benefit due to falls and syncope  Untreated sleep apnea - CPAP has been recommended in the past. Changes and loss of insurance affected his compliance with follow up for this. I stress the importance of treating this and how is is linked to #1.   Hyperlipidemia   Anxiety - this is being addressed.     Starting to see some improvement.   Increase spironolactone.   Amlodipine was increased yesterday.    Given his severe anxiety , particularly in regards to his elevated blood pressure. I would like to see his trends a little better before he goes. Hopefully home soon.     JANEEN Lara  [facility]  02/26/25  12:36 EST

## 2025-02-26 NOTE — CASE MANAGEMENT/SOCIAL WORK
Continued Stay Note  [facility]     Patient Name: Flako Coreas Sr.  MRN: [id number]  Today's Date: 2/26/2025    Admit Date: 2/23/2025    Plan: Home with assist of son. Pt states Health Insurance coverage starts Fri 2/28. Needs CPAP once insurance coverage obtained. Would need order to start OP PT after insurance coverage starts.   Discharge Plan       Row Name 02/26/25 1442       Plan    Plan Home with assist of son. Pt states Health Insurance coverage starts Fri 2/28. Needs CPAP once insurance coverage obtained. Would need order to start OP PT after insurance coverage starts.    Patient/Family in Agreement with Plan yes    Plan Comments Pt states Health Insurance coverage starts Fri 2/28. Needs CPAP once insurance coverage obtained. Will need to follow up with Has cane and walker at home. Son to assist at D/C and will transport. Hamliton Wolf                   Discharge Codes    No documentation.                 Expected Discharge Date and Time       Expected Discharge Date Expected Discharge Time    Feb 26, 2025               Hamilton Wolf RN

## 2025-02-26 NOTE — PLAN OF CARE
Problem: Adult Inpatient Plan of Care  Goal: Plan of Care Review  Outcome: Progressing  Flowsheets (Taken 2/26/2025 1726)  Progress: improving  Outcome Evaluation: Pt vitals stable. Prn meds for pain. Ambulating in [place] x3 . BP improved. Pt less anxious today. Possible dc tomorrow. pt safety maintained  Plan of Care Reviewed With: patient

## 2025-02-26 NOTE — PLAN OF CARE
THEA Cole paged the service stated the patient's blood pressure had been running 150/100.  Patient asymptomatic and sleeping.  I told her we would just continue to monitor and address later today.

## 2025-02-26 NOTE — PLAN OF CARE
Goal Outcome Evaluation:  Plan of Care Reviewed With: patient        Progress: improving  Outcome Evaluation: Pt seen for PT this AM, BP at rest 152/95. Pt with improved balance today - able to ambulate in hallway with rwx progressing toward SBA. VC to keep firm  on walker rather than fingertip support for improved stability. less scissoring and ataxia today. However elevated /115 after activity - returned to supine with rest, /104. RN notified. Educated pt on importance of no overdoing it and taking prolonged rest breaks throughout the day. Pt appears very anxious about cognitive deficits since his stroke, encouraged pt to seek OP speech therapy at d/c to assist with this. Encouraged pt to continue using rwx for safety.    Anticipated Discharge Disposition (PT): home with outpatient therapy services

## 2025-02-26 NOTE — PLAN OF CARE
Goal Outcome Evaluation:           Progress: no change  Outcome Evaluation: Pt VS stable. Systolic ran in 150's and diastolic in low 100's. LHA and cardiology notified. Pt safety maintained.

## 2025-02-26 NOTE — PROGRESS NOTES
[facility] Medicine Services  PROGRESS NOTE    Patient Name: Flako Coreas Sr.  : 1965  MRN: [id number]    Date of Admission: 2025  Primary Care Physician: Akash Rodriguez Jr., DO    Subjective   Subjective     CC:  Follow-up accelerated hypertension    Subjective: Patient still feeling little weak.  He is still having little difficulty walking around.  He feels he needs to get 1 more day to regain his strength and balance and thinks he will be ready to discharge home tomorrow.  He thinks if he goes home today he will be right back in the hospital.  He is calling his son today so that his son can help him tomorrow at home since he lives alone.  He denies any other new complaints.  He does seem a little anxious at baseline.    Review of Systems  No current fevers or chills  No current shortness of breath or cough  No current nausea, vomiting, or diarrhea  No current chest pain or palpitations       Objective   Objective     Vital Signs:   Temp:  [97.7 °F (36.5 °C)-99.4 °F (37.4 °C)] 98.2 °F (36.8 °C)  Heart Rate:  [54-89] 73  Resp:  [18] 18  BP: (132-180)/() 147/88        Physical Exam:  Constitutional: Awake, alert, no acute distress  HENT: NCAT, mucous membranes moist, neck supple  Respiratory: No cough or wheezes, normal respirations, nonlabored breathing   Cardiovascular: Pulse rate is normal, palpable radial pulses  Gastrointestinal:  Soft, nontender, nondistended  Musculoskeletal: BMI 28, somewhat chronically debilitated in appearance  Psychiatric: Somewhat anxious affect, cooperative, conversational  Neurologic: No slurred speech or facial droop, follows commands  Skin: No rashes or jaundice, warm      Results Reviewed:  Results from last 7 days   Lab Units 25  0350 25  0604 25  0351   WBC 10*3/mm3 8.17 8.35 7.65   HEMOGLOBIN g/dL 15.1 15.3 15.5   HEMATOCRIT % 46.2 45.6 46.6   PLATELETS 10*3/mm3 228 243 273     Results from last 7 days   Lab Units  02/26/25  0350 02/25/25  0604 02/24/25  0351 02/23/25  1051 02/23/25  0952 02/23/25  0057   SODIUM mmol/L 141 142 138  --   --  138   POTASSIUM mmol/L 3.2* 3.8 3.7  --   --  4.1   CHLORIDE mmol/L 111* 110* 106  --   --  107   CO2 mmol/L 20.5* 21.5* 22.5  --   --  21.0*   BUN mg/dL 14 15 14  --   --  15   CREATININE mg/dL 0.86 0.80 0.82  --   --  0.83   GLUCOSE mg/dL 107* 99 111*  --   --  115*   CALCIUM mg/dL 8.7 8.8 9.3  --   --  9.3   ALK PHOS U/L  --   --   --   --   --  75   ALT (SGPT) U/L  --   --   --   --   --  22   AST (SGOT) U/L  --   --   --   --   --  17   HSTROP T ng/L  --   --   --  <6 <6 <6     Estimated Creatinine Clearance: 120.8 mL/min (by C-G formula based on SCr of 0.86 mg/dL).    Microbiology Results Abnormal       None            Imaging Results (Last 24 Hours)       Procedure Component Value Units Date/Time    CT Head Without Contrast [[id number]] Collected: 02/23/25 1203     Updated: 02/26/25 1202    Narrative:      CT OF THE BRAIN WITHOUT CONTRAST 02/23/2025     HISTORY: Hypertension. Vision changes.     Axial images were obtained through the brain without intravenous  contrast.     The brain parenchyma and ventricular system appear within normal limits.  No mass lesions, midline shift, intracranial hemorrhage or evidence of  infarction is demonstrated. There is some distal left vertebral artery  calcification. Bony structures appear unremarkable.       Impression:      No acute process.      Radiation dose reduction techniques were utilized, including automated  exposure control and exposure modulation based on body size.        This report was finalized on 2/26/2025 10:51 AM by Dr. Salvador Arita M.D on Workstation: [id number]               Results for orders placed during the hospital encounter of 01/25/25    Adult Transthoracic Echo Complete W/ Cont if Necessary Per Protocol    Interpretation Summary    Left ventricular ejection fraction appears to be 56 - 60%.    The left ventricular  cavity is borderline dilated.    Left ventricular diastolic function was normal.    Estimated right ventricular systolic pressure from tricuspid regurgitation is normal (<35 mmHg).      I have reviewed the medications:  Scheduled Meds:amLODIPine, 10 mg, Oral, Q24H  aspirin, 81 mg, Oral, Daily  atorvastatin, 40 mg, Oral, Nightly  busPIRone, 10 mg, Oral, Q8H  carvedilol, 12.5 mg, Oral, BID With Meals  fluticasone, 2 spray, Each Nare, Daily  folic acid, 1 mg, Oral, Daily  Lidocaine, 2 patch, Transdermal, Q24H  lisinopril, 40 mg, Oral, Daily  multivitamin with minerals, 1 tablet, Oral, Daily  pantoprazole, 40 mg, Oral, QAM AC  senna-docusate sodium, 2 tablet, Oral, BID  sodium chloride, 10 mL, Intravenous, Q12H  [START ON 2/27/2025] spironolactone, 50 mg, Oral, Daily  thiamine, 100 mg, Oral, Daily  vilazodone, 40 mg, Oral, Daily      Continuous Infusions:   PRN Meds:.  acetaminophen **OR** acetaminophen **OR** acetaminophen    senna-docusate sodium **AND** polyethylene glycol **AND** bisacodyl **AND** bisacodyl    Calcium Replacement - Follow Nurse / BPA Driven Protocol    hydrOXYzine    Magnesium Standard Dose Replacement - Follow Nurse / BPA Driven Protocol    melatonin    nitroglycerin    ondansetron ODT **OR** ondansetron    Phosphorus Replacement - Follow Nurse / BPA Driven Protocol    Potassium Replacement - Follow Nurse / BPA Driven Protocol    sodium chloride    sodium chloride    Assessment & Plan   Assessment & Plan     Active Hospital Problems    Diagnosis  POA    **Hypertensive urgency [I16.0]  Yes    History of CVA (cerebrovascular accident) [Z86.73]  Not Applicable    Hyperglycemia [R73.9]  Yes    Peptic ulcer disease [K27.9]  Yes    History of diverticulosis [Z87.19]  Not Applicable    History of atrial fibrillation [Z86.79]  Not Applicable    Mood disorder [F39]  Yes    Alcohol abuse [F10.10]  Yes    History of vertebral artery stenosis [Z86.79]  Not Applicable    Lepe's esophagus [K22.70]  Yes     GERD (gastroesophageal reflux disease) [K21.9]  Yes    Essential hypertension [I10]  Yes    Hyperlipidemia [E78.5]  Yes      Resolved Hospital Problems   No resolved problems to display.        Brief Hospital Course to date:  Flako Coreas Sr. is a 59 y.o. male with hypertensive urgency    Discussion/plan for today: All medical problems are under my management today.  Labs, vitals, and imaging reviewed.  Spironolactone has been added for blood pressure control.  Adjusting dose tomorrow per cardiology recommendation.  Overall blood pressure trends are improved.  Hopefully high diastolic pressure will improve with time.  Discussed with him recommend he avoid NSAIDs.  I recommend Tylenol for mild pain.  Psychiatry recommends to continue current treatments for anxiety and mood disorder which seems to be stabilizing.  Patient is calling his son to help him more at home.  Hopefully additional assistance will be useful.  I will work with case management to try and coordinate outpatient physical therapy.    Hypertensive urgency: Adjustment of medicines as per above.  I have recommended he use a pill organizer and to have his son help him organize his medications at discharge.    Recurrent syncope: No current issue.  Loop recorder in place.    History of stroke: Noted    Paroxysmal atrial fibrillation: Beta-blocker.  Off anticoagulation due to risks with syncope.  Patient understands the risks versus benefits of anticoagulation.    Untreated sleep apnea: Recommend CPAP.    Hyperlipidemia: Statin.  Stable.    Anxiety and mood disorder: Continue current medications, patient has been seen by psychiatry.    DVT Prophylaxis:   SCDs      Disposition: Home in a.m. with family assist    CODE STATUS:   Code Status and Medical Interventions: CPR (Attempt to Resuscitate); Full Support   Ordered at: 02/23/25 1510     Code Status (Patient has no pulse and is not breathing):    CPR (Attempt to Resuscitate)     Medical Interventions  (Patient has pulse or is breathing):    Full Support       Alden Rausch MD  02/26/25

## 2025-02-26 NOTE — THERAPY TREATMENT NOTE
Patient Name: Flako Coreas .  : 1965    MRN: [id number]                              Today's Date: 2025       Admit Date: 2025    Visit Dx:     ICD-10-CM ICD-9-CM   1. Hypertensive urgency  I16.0 401.9   2. Severe uncontrolled hypertension  I10 401.9   3. Blurry vision  H53.8 368.8   4. 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    Anxiety about health    Vertebral artery stenosis, left    Memory loss    History of alcohol dependence    Vasovagal syncope    Elevated lipoprotein(a)    Elevated coronary artery calcium score    PAF (paroxysmal atrial fibrillation)    Syncope    History of atrial fibrillation    Alcohol use    Mood disorder    Alcohol withdrawal    Hypertensive urgency    History of CVA (cerebrovascular accident)    Hyperglycemia    Peptic ulcer disease    History of diverticulosis     Past Medical History:   Diagnosis Date    ADHD (attention deficit hyperactivity disorder)     On going issue    Anxiety     Lepe's esophagus     Benign prostatic hyperplasia Surgery in 2021    Brain concussion 2023    Clotting disorder Intestinal    Depression     Diverticulitis     Diverticulosis     Duodenitis     Enteritis     Failure to thrive (child)     Family history of blood clots     Fracture of wrist 1985    Fracture, foot 2019    GERD (gastroesophageal reflux disease)     Hyperlipidemia     Hypertension     Hypertensive emergency     Irritable bowel syndrome 2017    Low testosterone      Microcytosis     Pancreatitis     Peptic ulcer disease 2/23/2025    Pneumonia     PONV (postoperative nausea and vomiting)     Seasonal affective disorder     Shoulder injury     Stroke     Unexplained weight loss     Visual impairment 8/2023     Past Surgical History:   Procedure Laterality Date    CARDIAC ELECTROPHYSIOLOGY PROCEDURE N/A 01/24/2024    Procedure: Loop insertion- [employer] LINQ;  Surgeon: Kaela Walker MD;  Location: [facility] CATH INVASIVE LOCATION;  Service: Cardiovascular;  Laterality: N/A;    COLON SURGERY      COLONOSCOPY      COLONOSCOPY N/A 12/11/2022    Procedure: COLONOSCOPY INTO CECUM WITH HOT SNARE POLYPECTOMY;  Surgeon: Albert Gallo MD;  Location: [facility] ENDOSCOPY;  Service: Gastroenterology;  Laterality: N/A;  PRE- GI BLEED  POST- DIVERTICULOSIS, POLYP    COLONOSCOPY N/A 02/14/2024    Procedure: COLONOSCOPY into cecum/terminal ileum;  Surgeon: Albert Gallo MD;  Location: [facility] ENDOSCOPY;  Service: Gastroenterology;  Laterality: N/A;  diverticulosis, hemorrhoids    ENDOSCOPY N/A 12/10/2022    Procedure: ESOPHAGOGASTRODUODENOSCOPY;  Surgeon: Albert Gallo MD;  Location: [facility] ENDOSCOPY;  Service: Gastroenterology;  Laterality: N/A;  PRE- DARK STOOLS  POST- BARRETTS ESOPHAGUS    ENDOSCOPY N/A 02/14/2024    Procedure: ESOPHAGOGASTRODUODENOSCOPY with biopsy;  Surgeon: Albert Gallo MD;  Location: [facility] ENDOSCOPY;  Service: Gastroenterology;  Laterality: N/A;  long segment wilson's, hiatal hernia    FRACTURE SURGERY Right 1980    for broken arm    FRACTURE SURGERY Right     for broken hand    HAND SURGERY  1990    INCISION AND DRAINAGE ABSCESS  2015    anal    PROSTATE SURGERY      SHOULDER SURGERY  2022    SMALL INTESTINE SURGERY      TONSILLECTOMY      TRIGGER POINT INJECTION  2017      General Information       Row Name 02/26/25 1021          Physical Therapy Time and Intention    Document Type therapy note (daily note)  -ST     Mode of Treatment physical therapy  -ST        Row Name 02/26/25 1021          General Information    Patient Profile Reviewed yes  -ST     Existing Precautions/Restrictions fall  -ST       Row Name 02/26/25 1021          Cognition    Orientation Status (Cognition) oriented x 4  -ST       Row Name 02/26/25 1021          Safety Issues/Impairments Affecting Functional Mobility    Impairments Affecting Function (Mobility) balance;endurance/activity tolerance;postural/trunk control;pain  -ST     Comment, Safety Issues/Impairments (Mobility) nonskid socks, gait belt donned  -ST               User Key  (r) = Recorded By, (t) = Taken By, (c) = Cosigned By      Initials Name Provider Type    Brandi Roy PT Physical Therapist                   Mobility       Row Name 02/26/25 1021          Bed Mobility    Bed Mobility supine-sit;sit-supine  -ST     Supine-Sit Hay (Bed Mobility) standby assist  -ST     Sit-Supine Hay (Bed Mobility) standby assist  -ST     Assistive Device (Bed Mobility) bed rails  -       Row Name 02/26/25 1021          Sit-Stand Transfer    Sit-Stand Hay (Transfers) verbal cues;standby assist  -ST     Assistive Device (Sit-Stand Transfers) walker, front-wheeled  -ST       Row Name 02/26/25 1021          Gait/Stairs (Locomotion)    Hay Level (Gait) verbal cues;nonverbal cues (demo/gesture);contact guard;standby assist  -ST     Assistive Device (Gait) walker, front-wheeled  -ST     Distance in Feet (Gait) 120  x 2  -ST     Deviations/Abnormal Patterns (Gait) stride length decreased;ataxic;scissoring  -ST     Bilateral Gait Deviations heel strike decreased  -ST     Comment, (Gait/Stairs) scissorsing only noted x 2 today with turns. less ataxia with VC to keep hands with firm  on walker - pt frequently attempting to do fingertip support only.  -ST               User Key  (r) = Recorded By, (t) = Taken By, (c) = Cosigned By      Initials Name Provider Type    Brandi Roy PT Physical Therapist                    Obj/Interventions       Row Name 02/26/25 1022          Balance    Comment, Balance practiced stability with standing balance and perturbations without LOB and stepping strategy fwd/lat/retro x 5 bilat without UE. CGA for safety with this  -ST               User Key  (r) = Recorded By, (t) = Taken By, (c) = Cosigned By      Initials Name Provider Type    ST Brandi Jiang, PT Physical Therapist                   Goals/Plan    No documentation.                  Clinical Impression       Row Name 02/26/25 1022          Pain    Pretreatment Pain Rating 0/10 - no pain  -ST     Posttreatment Pain Rating 0/10 - no pain  -ST       Row Name 02/26/25 1022          Plan of Care Review    Plan of Care Reviewed With patient  -ST     Progress improving  -ST     Outcome Evaluation Pt seen for PT this AM, BP at rest 152/95. Pt with improved balance today - able to ambulate in hallway with rwx progressing toward SBA. VC to keep firm  on walker rather than fingertip support for improved stability. less scissoring and ataxia today. However elevated /115 after activity - returned to supine with rest, /104. RN notified. Educated pt on importance of no overdoing it and taking prolonged rest breaks throughout the day. Pt appears very anxious about cognitive deficits since his stroke, encouraged pt to seek OP speech therapy at d/c to assist with this. Encouraged pt to continue using rwx for safety.  -       Row Name 02/26/25 1022          Therapy Assessment/Plan (PT)    Rehab Potential (PT) good  -ST     Criteria for Skilled Interventions Met (PT) yes  -ST     Therapy Frequency (PT) 5 times/wk  -       Row Name 02/26/25 1022          Positioning and Restraints    Pre-Treatment Position in bed  -ST     Post Treatment Position bed  -ST     In Bed notified nsg;supine;call light within reach;encouraged to call for assist;exit alarm on  -ST               User Key  (r) = Recorded By, (t) = Taken By,  (c) = Cosigned By      Initials Name Provider Type    Brandi Roy PT Physical Therapist                   Outcome Measures       Row Name 02/26/25 1025          How much help from another person do you currently need...    Turning from your back to your side while in flat bed without using bedrails? 4  -ST     Moving from lying on back to sitting on the side of a flat bed without bedrails? 4  -ST     Moving to and from a bed to a chair (including a wheelchair)? 3  -ST     Standing up from a chair using your arms (e.g., wheelchair, bedside chair)? 4  -ST     Climbing 3-5 steps with a railing? 3  -ST     To walk in hospital room? 3  -ST     AM-PAC 6 Clicks Score (PT) 21  -ST     Highest Level of Mobility Goal 6 --> Walk 10 steps or more  -ST               User Key  (r) = Recorded By, (t) = Taken By, (c) = Cosigned By      Initials Name Provider Type    Brandi Roy PT Physical Therapist                                 Physical Therapy Education       Title: PT OT SLP Therapies (In Progress)       Topic: Physical Therapy (In Progress)       Point: Mobility training (Done)       Learning Progress Summary            Patient Acceptance, E,TB, VU,NR by ST at 2/26/2025 1025    Acceptance, E,TB, NR,VU by ST at 2/25/2025 1158    Acceptance, TB,E, VU,NR by ST at 2/24/2025 1154                      Point: Home exercise program (Not Started)       Learner Progress:  Not documented in this visit.              Point: Body mechanics (Done)       Learning Progress Summary            Patient Acceptance, E,TB, VU,NR by ST at 2/26/2025 1025    Acceptance, E,TB, NR,VU by ST at 2/25/2025 1158    Acceptance, TB,E, VU,NR by ST at 2/24/2025 1154                      Point: Precautions (Done)       Learning Progress Summary            Patient Acceptance, E,TB, NR,VU by ST at 2/25/2025 1158    Acceptance, TB,E, VU,NR by ST at 2/24/2025 1154                                      User Key       Initials Effective Dates  Name Provider Type Discipline    ST 09/22/22 -  Brandi Jiang PT Physical Therapist PT                  PT Recommendation and Plan     Progress: improving  Outcome Evaluation: Pt seen for PT this AM, BP at rest 152/95. Pt with improved balance today - able to ambulate in hallway with rwx progressing toward SBA. VC to keep firm  on walker rather than fingertip support for improved stability. less scissoring and ataxia today. However elevated /115 after activity - returned to supine with rest, /104. RN notified. Educated pt on importance of no overdoing it and taking prolonged rest breaks throughout the day. Pt appears very anxious about cognitive deficits since his stroke, encouraged pt to seek OP speech therapy at d/c to assist with this. Encouraged pt to continue using rwx for safety.     Time Calculation:         PT Charges       Row Name 02/26/25 1025             Time Calculation    Start Time 0855  -ST      Stop Time 0928  -ST      Time Calculation (min) 33 min  -ST      PT Received On 02/26/25  -ST      PT - Next Appointment 02/27/25  -ST         Time Calculation- PT    Total Timed Code Minutes- PT 33 minute(s)  -ST         Timed Charges    76448 -  PT Neuromuscular Reeducation Minutes 12  -ST      64060 - PT Therapeutic Activity Minutes 11  -ST         Total Minutes    Timed Charges Total Minutes 23  -ST       Total Minutes 23  -ST                User Key  (r) = Recorded By, (t) = Taken By, (c) = Cosigned By      Initials Name Provider Type    ST Brandi Jiang PT Physical Therapist                  Therapy Charges for Today       Code Description Service Date Service Provider Modifiers Qty    76408231766 HC PT NEUROMUSC RE EDUCATION EA 15 MIN 2/25/2025 Brandi Jiang, PT GP 1    30556946215 HC PT THERAPEUTIC ACT EA 15 MIN 2/25/2025 Brandi Jaing, PT GP 1    11798844977 HC PT NEUROMUSC RE EDUCATION EA 15 MIN 2/26/2025 Brandi Jiang, PT GP 1    05322303122  PT  THERAPEUTIC ACT EA 15 MIN 2/26/2025 Brandi Jiang, PT GP 1            PT G-Codes  Outcome Measure Options: AM-PAC 6 Clicks Basic Mobility (PT)  AM-PAC 6 Clicks Score (PT): 21  PT Discharge Summary  Anticipated Discharge Disposition (PT): home with outpatient therapy services    Brandi Jiang, CARIN  2/26/2025

## 2025-02-27 ENCOUNTER — READMISSION MANAGEMENT (OUTPATIENT)
Dept: CALL CENTER | Facility: HOSPITAL | Age: 60
End: 2025-02-27
Payer: COMMERCIAL

## 2025-02-27 VITALS
OXYGEN SATURATION: 95 % | HEIGHT: 74 IN | RESPIRATION RATE: 18 BRPM | HEART RATE: 65 BPM | TEMPERATURE: 98.6 F | SYSTOLIC BLOOD PRESSURE: 150 MMHG | WEIGHT: 224.87 LBS | DIASTOLIC BLOOD PRESSURE: 95 MMHG | BODY MASS INDEX: 28.86 KG/M2

## 2025-02-27 PROBLEM — I16.0 HYPERTENSIVE URGENCY: Status: RESOLVED | Noted: 2025-02-23 | Resolved: 2025-02-27

## 2025-02-27 LAB
ANION GAP SERPL CALCULATED.3IONS-SCNC: 10 MMOL/L (ref 5–15)
BASOPHILS # BLD AUTO: 0.04 10*3/MM3 (ref 0–0.2)
BASOPHILS NFR BLD AUTO: 0.6 % (ref 0–1.5)
BUN SERPL-MCNC: 10 MG/DL (ref 6–20)
BUN/CREAT SERPL: 12.3 (ref 7–25)
CALCIUM SPEC-SCNC: 8.7 MG/DL (ref 8.6–10.5)
CHLORIDE SERPL-SCNC: 115 MMOL/L (ref 98–107)
CO2 SERPL-SCNC: 20 MMOL/L (ref 22–29)
CREAT SERPL-MCNC: 0.81 MG/DL (ref 0.76–1.27)
DEPRECATED RDW RBC AUTO: 42.8 FL (ref 37–54)
EGFRCR SERPLBLD CKD-EPI 2021: 102.8 ML/MIN/1.73
EOSINOPHIL # BLD AUTO: 0.6 10*3/MM3 (ref 0–0.4)
EOSINOPHIL NFR BLD AUTO: 8.3 % (ref 0.3–6.2)
ERYTHROCYTE [DISTWIDTH] IN BLOOD BY AUTOMATED COUNT: 13.6 % (ref 12.3–15.4)
GLUCOSE SERPL-MCNC: 97 MG/DL (ref 65–99)
HCT VFR BLD AUTO: 44.3 % (ref 37.5–51)
HGB BLD-MCNC: 14.6 G/DL (ref 13–17.7)
IMM GRANULOCYTES # BLD AUTO: 0.02 10*3/MM3 (ref 0–0.05)
IMM GRANULOCYTES NFR BLD AUTO: 0.3 % (ref 0–0.5)
LYMPHOCYTES # BLD AUTO: 2.08 10*3/MM3 (ref 0.7–3.1)
LYMPHOCYTES NFR BLD AUTO: 28.9 % (ref 19.6–45.3)
MCH RBC QN AUTO: 28.3 PG (ref 26.6–33)
MCHC RBC AUTO-ENTMCNC: 33 G/DL (ref 31.5–35.7)
MCV RBC AUTO: 85.9 FL (ref 79–97)
MONOCYTES # BLD AUTO: 0.5 10*3/MM3 (ref 0.1–0.9)
MONOCYTES NFR BLD AUTO: 6.9 % (ref 5–12)
NEUTROPHILS NFR BLD AUTO: 3.96 10*3/MM3 (ref 1.7–7)
NEUTROPHILS NFR BLD AUTO: 55 % (ref 42.7–76)
NRBC BLD AUTO-RTO: 0 /100 WBC (ref 0–0.2)
PLATELET # BLD AUTO: 228 10*3/MM3 (ref 140–450)
PMV BLD AUTO: 10.2 FL (ref 6–12)
POTASSIUM SERPL-SCNC: 4.5 MMOL/L (ref 3.5–5.2)
RBC # BLD AUTO: 5.16 10*6/MM3 (ref 4.14–5.8)
SODIUM SERPL-SCNC: 145 MMOL/L (ref 136–145)
WBC NRBC COR # BLD AUTO: 7.2 10*3/MM3 (ref 3.4–10.8)

## 2025-02-27 PROCEDURE — 83835 ASSAY OF METANEPHRINES: CPT | Performed by: INTERNAL MEDICINE

## 2025-02-27 PROCEDURE — 80048 BASIC METABOLIC PNL TOTAL CA: CPT | Performed by: INTERNAL MEDICINE

## 2025-02-27 PROCEDURE — 99214 OFFICE O/P EST MOD 30 MIN: CPT | Performed by: NURSE PRACTITIONER

## 2025-02-27 PROCEDURE — 85025 COMPLETE CBC W/AUTO DIFF WBC: CPT | Performed by: INTERNAL MEDICINE

## 2025-02-27 PROCEDURE — G0378 HOSPITAL OBSERVATION PER HR: HCPCS

## 2025-02-27 RX ORDER — CARVEDILOL 12.5 MG/1
12.5 TABLET ORAL 2 TIMES DAILY WITH MEALS
Qty: 60 TABLET | Refills: 1 | Status: SHIPPED | OUTPATIENT
Start: 2025-02-27

## 2025-02-27 RX ORDER — AMLODIPINE BESYLATE 10 MG/1
10 TABLET ORAL
Qty: 30 TABLET | Refills: 1 | Status: SHIPPED | OUTPATIENT
Start: 2025-02-28

## 2025-02-27 RX ORDER — SPIRONOLACTONE 50 MG/1
50 TABLET, FILM COATED ORAL DAILY
Qty: 60 TABLET | Refills: 1 | Status: SHIPPED | OUTPATIENT
Start: 2025-02-28

## 2025-02-27 RX ORDER — BUSPIRONE HYDROCHLORIDE 10 MG/1
10 TABLET ORAL EVERY 8 HOURS SCHEDULED
Qty: 90 TABLET | Refills: 0 | Status: SHIPPED | OUTPATIENT
Start: 2025-02-27

## 2025-02-27 RX ADMIN — PANTOPRAZOLE SODIUM 40 MG: 40 TABLET, DELAYED RELEASE ORAL at 06:52

## 2025-02-27 RX ADMIN — Medication 10 ML: at 08:31

## 2025-02-27 RX ADMIN — BUSPIRONE HYDROCHLORIDE 10 MG: 10 TABLET ORAL at 06:52

## 2025-02-27 RX ADMIN — Medication 100 MG: at 08:29

## 2025-02-27 RX ADMIN — ASPIRIN 81 MG: 81 TABLET, DELAYED RELEASE ORAL at 08:29

## 2025-02-27 RX ADMIN — VILAZODONE HYDROCHLORIDE 40 MG: 40 TABLET, FILM COATED ORAL at 08:28

## 2025-02-27 RX ADMIN — CARVEDILOL 12.5 MG: 12.5 TABLET, FILM COATED ORAL at 08:29

## 2025-02-27 RX ADMIN — AMLODIPINE BESYLATE 10 MG: 10 TABLET ORAL at 08:29

## 2025-02-27 RX ADMIN — Medication 1 TABLET: at 08:29

## 2025-02-27 RX ADMIN — SENNOSIDES AND DOCUSATE SODIUM 2 TABLET: 50; 8.6 TABLET ORAL at 08:29

## 2025-02-27 RX ADMIN — FLUTICASONE PROPIONATE 2 SPRAY: 50 SPRAY, METERED NASAL at 08:31

## 2025-02-27 RX ADMIN — SPIRONOLACTONE 50 MG: 25 TABLET ORAL at 08:29

## 2025-02-27 RX ADMIN — LISINOPRIL 40 MG: 20 TABLET ORAL at 08:28

## 2025-02-27 RX ADMIN — FOLIC ACID 1 MG: 1 TABLET ORAL at 08:29

## 2025-02-27 NOTE — PROGRESS NOTES
[facility] Center follow up d/t anxiety; this writer reviewed chart and spoke with RN Daryl. Per RN, patient is doing pretty well- mood improvements noted; he slept overnight and walked around medical unit this morning.  Patient has outpatient psychiatry and counseling resources; he was seen by psychiatrist, Dr. Garrido who continued Viibryd- ernie/MD has signed off.  Discharge home with son's assistance and outpatient physical therapy possibly today; CCP involved and providing assistance. No further needs/concerns noted at this time per RN and/or medical team; [facility] to continue following.

## 2025-02-27 NOTE — PLAN OF CARE
Problem: Adult Inpatient Plan of Care  Goal: Plan of Care Review  Outcome: Met  Flowsheets (Taken 2/27/2025 4432)  Progress: improving  Outcome Evaluation: Vitals stable. BP improved. pt discharged home with Rx, instructions, and follow ups.  Plan of Care Reviewed With: patient

## 2025-02-27 NOTE — PROGRESS NOTES
"    Patient Name: Flako Coreas Sr.  :1965  59 y.o.      Patient Care Team:  Akash Rodriguez Jr., DO as PCP - General (Sports Medicine)  Karlene Leach MSW as  (Amb Case Mgmt) (Population Health)    Chief Complaint: hypertension     Interval History: overall BP trends have shown great improvement. He is comfortable and ready to go home.        Objective   Vital Signs  Temp:  [98.2 °F (36.8 °C)-98.8 °F (37.1 °C)] 98.6 °F (37 °C)  Heart Rate:  [61-73] 61  Resp:  [18] 18  BP: (123-162)/() 123/90    Intake/Output Summary (Last 24 hours) at 2025 06  Last data filed at 2025 0500  Gross per 24 hour   Intake 0 ml   Output --   Net 0 ml     Flowsheet Rows      Flowsheet Row First Filed Value   Admission Height 188 cm (74\") Documented at 2025   Admission Weight 102 kg (224 lb 13.9 oz) Documented at 2025            Physical Exam:   General Appearance:    Alert, cooperative, in no acute distress   Lungs:     Clear to auscultation.  Normal respiratory effort and rate.      Heart:    Regular rhythm and normal rate, normal S1 and S2, no murmurs, gallops or rubs.     Chest Wall:    No abnormalities observed   Abdomen:     Soft, nontender, positive bowel sounds.     Extremities:   no cyanosis, clubbing or edema.  No marked joint deformities.  Adequate musculoskeletal strength.       Results Review:    Results from last 7 days   Lab Units 25  0312   SODIUM mmol/L 145   POTASSIUM mmol/L 4.5   CHLORIDE mmol/L 115*   CO2 mmol/L 20.0*   BUN mg/dL 10   CREATININE mg/dL 0.81   GLUCOSE mg/dL 97   CALCIUM mg/dL 8.7     Results from last 7 days   Lab Units 25  1051 25  0952 25  0057   HSTROP T ng/L <6 <6 <6     Results from last 7 days   Lab Units 25  0312   WBC 10*3/mm3 7.20   HEMOGLOBIN g/dL 14.6   HEMATOCRIT % 44.3   PLATELETS 10*3/mm3 228                           Medication Review:   amLODIPine, 10 mg, Oral, Q24H  aspirin, 81 mg, Oral, " Daily  atorvastatin, 40 mg, Oral, Nightly  busPIRone, 10 mg, Oral, Q8H  carvedilol, 12.5 mg, Oral, BID With Meals  fluticasone, 2 spray, Each Nare, Daily  folic acid, 1 mg, Oral, Daily  Lidocaine, 2 patch, Transdermal, Q24H  lisinopril, 40 mg, Oral, Daily  multivitamin with minerals, 1 tablet, Oral, Daily  pantoprazole, 40 mg, Oral, QAM AC  senna-docusate sodium, 2 tablet, Oral, BID  sodium chloride, 10 mL, Intravenous, Q12H  spironolactone, 50 mg, Oral, Daily  thiamine, 100 mg, Oral, Daily  vilazodone, 40 mg, Oral, Daily              Assessment & Plan      Hypertensive urgency  , secondary work up underway. Renal artery duplex ok.   Recurrent syncope, loop recorder in place.   History of left cerebellar CVA August 2023  Paroxysmal atrial fibrillation, low burden. AC stopped risk >> benefit due to falls and syncope  Untreated sleep apnea - CPAP has been recommended in the past. Changes and loss of insurance affected his compliance with follow up for this. I stress the importance of treating this and how is is linked to #1.   Hyperlipidemia   Anxiety - this is being addressed.     BP trends much better and stable for discharge. Will arrange 1 week follow up in clinic for close monitoring.     JANEEN Lara  [facility]  02/27/25  06:36 EST

## 2025-02-27 NOTE — CASE MANAGEMENT/SOCIAL WORK
Case Management Discharge Note      Final Note: Home with assist of son. Over Income for Medicaid. Per pt, his insurance takes affect 2/28/25 and pays retrospect to 1/1/25. Once Insurance active pt will need OP PT/ST setup by PCP. Transport by family    Provided Post Acute Provider List?: N/A  Provided Post Acute Provider Quality & Resource List?: N/A    Selected Continued Care - Discharged on 2/27/2025 Admission date: 2/23/2025 - Discharge disposition: Home or Self Care      Destination    No services have been selected for the patient.                Durable Medical Equipment    No services have been selected for the patient.                Dialysis/Infusion    No services have been selected for the patient.                Home Medical Care    No services have been selected for the patient.                Therapy    No services have been selected for the patient.                Community Resources    No services have been selected for the patient.                Community & DME    No services have been selected for the patient.                    Selected Continued Care - Episodes Includes continued care and service providers with selected services from the active episodes listed below      Ambulatory Social Work Case Management Episode start date: 2/4/2025      Community & DME       Service Provider Services Address Phone Fax Patient Preferred    [place] [employer] Disability Benefits [address] [place] [address] [phone] --                          Transportation Services  Private: Car    Final Discharge Disposition Code: 01 - home or self-care

## 2025-02-27 NOTE — DISCHARGE SUMMARY
"    [facility] Medicine Services  DISCHARGE SUMMARY    Patient Name: Flako Coreas Sr.  : 1965  MRN: [id number]    Date of Admission: 2025  7:58 AM  Date of Discharge:   2025  Primary Care Physician: Akash Rodriguez Jr., DO    Consults       Date and Time Order Name Status Description    2025 12:04 PM Inpatient Psychiatrist Consult Completed     2025  3:10 PM Inpatient Cardiology Consult Completed     2025  8:57 AM Inpatient Neurology Consult General Completed     2025  4:19 PM Inpatient Gastroenterology Consult Completed     2025  4:01 PM Inpatient Psychiatrist Consult Completed     2025  4:23 PM Inpatient Cardiology Consult Completed             Hospital Course       Active Hospital Problems    Diagnosis  POA    History of CVA (cerebrovascular accident) [Z86.73]  Not Applicable    Hyperglycemia [R73.9]  Yes    Peptic ulcer disease [K27.9]  Yes    History of diverticulosis [Z87.19]  Not Applicable    History of atrial fibrillation [Z86.79]  Not Applicable    Mood disorder [F39]  Yes    Alcohol abuse [F10.10]  Yes    History of vertebral artery stenosis [Z86.79]  Not Applicable    Lepe's esophagus [K22.70]  Yes    GERD (gastroesophageal reflux disease) [K21.9]  Yes    Essential hypertension [I10]  Yes    Hyperlipidemia [E78.5]  Yes      Resolved Hospital Problems    Diagnosis Date Resolved POA    **Hypertensive urgency [I16.0] 2025 Yes            Chief Complaint   Patient presents with    Hypertension   \"I cannot bring my blood pressure down\".     History Present Illness   History of present illness as written by the admitting physician on the day of admission 2025  \"A 59 years old gentleman with a past history of hypertension/atrial fibrillation/cerebellar CVA with ataxia and left upper extremity weakness/GERD/Lepe's esophagus/diverticulosis/peptic ulcer disease/mood disorder and memory deficit/dyslipidemia and left vertebral artery " "stenosis.  Patient has reported that his blood pressure continues to be elevated over the last 2 weeks and is not coming down easily.  He does describe blurred vision with death and lower extremity weakness leading him to fall.  Positive head trauma and one of the falls.  No loss of consciousness or dizziness.  Results positive of left-sided weakness.  No slurred speech.  No double vision or loss of the vision.  He does describe palpitation.  There is no chest pain.  No shortness of breath.  No PND or orthopnea.  No ankle edema.  No cough.  No wheeze.  No hemoptysis.  Patient reported that he came to the ER yesterday and was given medication to lower his blood pressure down and he was sent home.  He states that he has taken a total of 15 mg of Norvasc/10 mg of Coreg/80 mg of lisinopril/hydralazine hours.  Patient reports that last admission he was here approximately 2 weeks ago he was treated as an alcohol withdrawal with benzodiazepines and this has caused problems with mentation and level of consciousness and states that he would not want to take benzodiazepines or narcotics.  Patient states that he does not smoke.  Patient states that intake was 2 to 3 weeks ago.  He reports that he usually does not drink much approximately 1 drink per day before he quit completely for 3 weeks.\"       Hospital Course:  Flako Coreas Sr. is a 59 y.o. male with hypertensive urgency as per above.    Was found to have profoundly high blood pressures concerning for hypertensive emergency or hypertensive urgency.  He was having palpitations upon presentation.  He was evaluated with the assistance of the cardiology consult team.  His blood pressure medications were carefully adjusted and he showed overall trend with improvement.  It would take a week till his blood pressure medicines achieved perfectly steady state.  He will need to follow-up closely with primary care provider and with cardiology for follow-up and blood pressure " checks.  Several labs are pending and will be followed by cardiology clinic follow-up.  Patient was recommended to avoid NSAIDs as these can affect blood pressure. Discussed with him recommend he avoid NSAIDs.  I recommend Tylenol for mild pain.  Patient did have some issues with anxiety and mood disorder.  Psychiatry recommends to continue current treatments for anxiety and mood disorder which seems to be stabilizing.  Patient is calling his son to help him more at home.  Hopefully additional assistance will be useful.  Patient would like to try to get some additional outpatient PT OT and is in working getting insurance.  Once this is more finalized his primary care should be able to arrange this once he has better coverage.     Hypertensive urgency: Resolved  Controlled, discharged on medications below.  Cardiology is appreciated discussed with cardiology today     Recurrent syncope: Resolved  No current issue.  Loop recorder in place.  Cardiology follow-up     History of stroke:   Noted.  No new neurologic symptoms.  Needs blood pressure control and medications     Paroxysmal atrial fibrillation:   Beta-blocker.  Off anticoagulation due to risks with syncope.  Patient understands the risks versus benefits of anticoagulation.     Untreated sleep apnea:   Recommend CPAP.     Hyperlipidemia: Statin.     Anxiety and mood disorder:   Continue current medications, patient has been seen by psychiatry.    At the time of discharge patient was told to take all medications as prescribed, keep all follow-up appointments, and call their doctor or return to the hospital with any worsening or concerning symptoms.    Please note that this note was made using Dragon voice recognition software               Day of Discharge     Subjective: Patient states he feels ready to discharge home today.  He is very pleased with his progress.  He feels comfortable with his current blood pressure medications.  He understands he can call  "cardiology clinic with questions he agrees to follow-up with primary care and cardiology.  He has no new complaints or cardiac symptoms.    No current fevers or chills  No current shortness of breath or cough  No current nausea, vomiting, or diarrhea  No current chest pain or palpitations      Vital Signs:   Temp:  [98.6 °F (37 °C)] 98.6 °F (37 °C)  Heart Rate:  [61-65] 65  Resp:  [18] 18  BP: (123-167)/(90-98) 150/95     Physical Exam:  Constitutional: Awake, alert  HENT: NCAT, mucous membranes moist, neck supple  Respiratory: No cough or wheezes, nonlabored breathing   Cardiovascular: Pulse rate is normal  Psychiatric: Appropriate affect, cooperative, conversational  Neurologic: No slurred speech or facial droop, follows commands  Skin: No rashes or jaundice, warm      Pertinent  and/or Most Recent Results     Results from last 7 days   Lab Units 02/27/25  0312 02/26/25  1619 02/26/25  0350 02/25/25  0604 02/24/25  0351 02/23/25  0057   WBC 10*3/mm3 7.20  --  8.17 8.35 7.65 7.95   HEMOGLOBIN g/dL 14.6  --  15.1 15.3 15.5 15.7   HEMATOCRIT % 44.3  --  46.2 45.6 46.6 47.2   PLATELETS 10*3/mm3 228  --  228 243 273 236   SODIUM mmol/L 145  --  141 142 138 138   POTASSIUM mmol/L 4.5 3.5 3.2* 3.8 3.7 4.1   CHLORIDE mmol/L 115*  --  111* 110* 106 107   CO2 mmol/L 20.0*  --  20.5* 21.5* 22.5 21.0*   BUN mg/dL 10  --  14 15 14 15   CREATININE mg/dL 0.81  --  0.86 0.80 0.82 0.83   GLUCOSE mg/dL 97  --  107* 99 111* 115*   CALCIUM mg/dL 8.7  --  8.7 8.8 9.3 9.3     Results from last 7 days   Lab Units 02/23/25  0057   BILIRUBIN mg/dL 0.4   ALK PHOS U/L 75   ALT (SGPT) U/L 22   AST (SGOT) U/L 17           Invalid input(s): \"TG\", \"LDLCALC\", \"LDLREALC\"  Results from last 7 days   Lab Units 02/23/25  1540 02/23/25  1051 02/23/25  0952 02/23/25  0057   TSH uIU/mL 0.775  --   --   --    HEMOGLOBIN A1C % 5.50  --   --   --    HSTROP T ng/L  --  <6 <6 <6       Brief Urine Lab Results  (Last result in the past 365 days)        Color  "  Clarity   Blood   Leuk Est   Nitrite   Protein   CREAT   Urine HCG        01/30/25 1646 Yellow   Clear   Small (1+)   Small (1+)   Positive   Negative                       Imaging Results (All)       Procedure Component Value Units Date/Time    CT Head Without Contrast [[id number]] Collected: 02/23/25 1203     Updated: 02/26/25 1202    Narrative:      CT OF THE BRAIN WITHOUT CONTRAST 02/23/2025     HISTORY: Hypertension. Vision changes.     Axial images were obtained through the brain without intravenous  contrast.     The brain parenchyma and ventricular system appear within normal limits.  No mass lesions, midline shift, intracranial hemorrhage or evidence of  infarction is demonstrated. There is some distal left vertebral artery  calcification. Bony structures appear unremarkable.       Impression:      No acute process.      Radiation dose reduction techniques were utilized, including automated  exposure control and exposure modulation based on body size.        This report was finalized on 2/26/2025 10:51 AM by Dr. Salvador Arita M.D on Workstation: [id number]       XR Hip With or Without Pelvis 2 - 3 View Left [[id number]] Collected: 02/24/25 1448     Updated: 02/24/25 1453    Narrative:      XR HIP W OR WO PELVIS 2-3 VIEW LEFT-     INDICATIONS: Trauma     TECHNIQUE: FRONTAL VIEWS OF THE PELVIS, 2 VIEWS OF THE LEFT HIP     COMPARISON: None available     FINDINGS:     No acute fracture, erosion, or dislocation is identified. Hip joint  spaces appear preserved. Arterial calcifications are noted.  Follow-up/further evaluation can be obtained as indications persist.       Impression:         As described.           This report was finalized on 2/24/2025 2:50 PM by Dr. Ayo Meraz M.D on Workstation: [id number]               Results for orders placed during the hospital encounter of 02/23/25    Duplex Renal Artery - Bilateral Complete CAR    Interpretation Summary    Normal right renal artery.    Normal  left renal artery.    This is actually better seen on the CT angiogram of the chest dated 1/25/2025.      Results for orders placed during the hospital encounter of 02/23/25    Duplex Renal Artery - Bilateral Complete CAR    Interpretation Summary    Normal right renal artery.    Normal left renal artery.    This is actually better seen on the CT angiogram of the chest dated 1/25/2025.      Results for orders placed during the hospital encounter of 01/25/25    Adult Transthoracic Echo Complete W/ Cont if Necessary Per Protocol    Interpretation Summary    Left ventricular ejection fraction appears to be 56 - 60%.    The left ventricular cavity is borderline dilated.    Left ventricular diastolic function was normal.    Estimated right ventricular systolic pressure from tricuspid regurgitation is normal (<35 mmHg).      Plan for Follow-up of Pending Labs/Results: Cardiology clinic  Pending Labs       Order Current Status    5 HIAA, Urine, Quantitative, 24 Hour - Urine, Clean Catch In process    Aldosterone In process    Aldosterone / Renin Ratio In process    Catecholamine+VMA, 24-Hr Urine - Urine, Clean Catch In process    Metanephrines, Frac. Free, Plasma In process          Discharge Details        Discharge Medications        New Medications        Instructions Start Date   busPIRone 10 MG tablet  Commonly known as: BUSPAR   10 mg, Oral, Every 8 Hours Scheduled      melatonin 5 MG tablet tablet   5 mg, Oral, Nightly PRN      spironolactone 50 MG tablet  Commonly known as: ALDACTONE   50 mg, Oral, Daily   Start Date: February 28, 2025     vitamin B-12 500 MCG tablet  Commonly known as: CYANOCOBALAMIN   500 mcg, Oral, Daily             Changes to Medications        Instructions Start Date   amLODIPine 10 MG tablet  Commonly known as: NORVASC  What changed:   medication strength  how much to take  when to take this   10 mg, Oral, Every 24 Hours Scheduled   Start Date: February 28, 2025     carvedilol 12.5 MG  tablet  Commonly known as: COREG  What changed:   medication strength  how much to take  additional instructions   12.5 mg, Oral, 2 Times Daily With Meals             Continue These Medications        Instructions Start Date   ASPIRIN 81 PO   81 mg, Daily      atorvastatin 40 MG tablet  Commonly known as: LIPITOR   40 mg, Oral, Nightly      diazePAM 2 MG tablet  Commonly known as: Valium   2 mg, Oral, Every 12 Hours PRN      folic acid 1 MG tablet  Commonly known as: FOLVITE   1 mg, Oral, Daily      lisinopril 40 MG tablet  Commonly known as: PRINIVIL,ZESTRIL   40 mg, Oral, Daily      multivitamin with minerals tablet tablet   1 tablet, Oral, Daily      pantoprazole 40 MG EC tablet  Commonly known as: PROTONIX   40 mg, Oral, Every Morning Before Breakfast      thiamine 100 MG tablet  Commonly known as: VITAMIN B1   100 mg, Oral, Daily      vilazodone 40 MG tablet tablet  Commonly known as: Viibryd   40 mg, Oral, Daily               No Active Allergies      Discharge Disposition:  Home or Self Care    Diet:  Hospital:  No active diet order      Activity:  Activity Instructions       Activity as Tolerated                   CODE STATUS:    Code Status and Medical Interventions: CPR (Attempt to Resuscitate); Full Support   Ordered at: 02/23/25 1510     Code Status (Patient has no pulse and is not breathing):    CPR (Attempt to Resuscitate)     Medical Interventions (Patient has pulse or is breathing):    Full Support       Future Appointments   Date Time Provider Department Center   3/11/2025 11:45 AM Chinmay Taylor MD [facility]           Additional Instructions for the Follow-ups that You Need to Schedule       Discharge Follow-up with PCP   As directed       Currently Documented PCP:    Akash Rodriguez Jr., DO    PCP Phone Number:    [phone]     Follow Up Details: 1-2 weeks call today for apmt        Discharge Follow-up with Specified Provider: Cardiology as instructed; 1 Week   As directed       To: Cardiology as instructed   Follow Up: 1 Week               Follow-up Information       Akash Rodriguez Jr., DO .    Specialties: Sports Medicine, Family Medicine, Emergency Medicine  Why: 1-2 weeks call today for apmt  Contact information:  [address]  [facility] [address]  [facility] [id number] [phone]               Chinmay Taylor MD. Go on 3/11/2025.    Specialty: Cardiology  Why: Appointment time is 11:45 AM.  Contact information:  [address]  [facility] [address]  [facility] [id number] [phone]                                 Alden Rausch MD  02/27/25      Time Spent on Discharge:  I spent greater than 35 minutes on this discharge activity which included: face-to-face encounter with the patient, reviewing the data in the system, coordination of the care with the nursing staff as well as consultants, documentation, and entering orders.

## 2025-02-27 NOTE — PAYOR COMM NOTE
"Nimesh Coreas Sr. (59 y.o. Male)      RECEIVED INSURANCE INFORMATION TODAY 02/27/2025    SEE FOR OBSERVATION:  ID# [id number]    UR: [phone],   [phone]    [facility]:  NPI [id number] [facility]# [id number]    PERRI KIM RN, CCP                     Date of Birth   1965    Social Security Number       Address   [address] [place] [address]    Home Phone   [phone]    MRN   [id number]       Mormonism   Confucianist    Marital Status   Single                            Admission Date   2/23/25    Admission Type   Emergency    Admitting Provider   Ester Bright MD    Attending Provider   Alden Rausch MD    Department, Room/Bed   [address], E457/1       Discharge Date       Discharge Disposition   Home or Self Care    Discharge Destination                                 Attending Provider: Alden Rausch MD    Allergies: No Active Allergies    Isolation: None   Infection: None   Code Status: CPR    Ht: 188 cm (74\")   Wt: 102 kg (224 lb 13.9 oz)    Admission Cmt: None   Principal Problem: Hypertensive urgency [I16.0]                   Active Insurance as of 2/23/2025       Primary Coverage       Payor Plan Insurance Group Employer/Plan Group    [employer] [place] [employer] [place]       Payor Plan Address Payor Plan Phone Number Payor Plan Fax Number Effective Dates    [address] [phone]  1/1/2025 - None Entered    [facility] [id number]         Subscriber Name Subscriber Birth Date Member ID       NIMESH COREAS SR. 1965 [id number]                     Emergency Contacts        (Rel.) Home Phone Work Phone Mobile Phone    Nimesh Coreas (Son) [phone] -- [phone]                 History & Physical        Ester Bright MD at 02/23/25 1510              Patient Name:  Nimesh Coreas Sr.  YOB: 1965  MRN:  [id number]  Admit Date:  " "2/23/2025  Patient Care Team:  Akash Rodriguez Jr., DO as PCP - General (Sports Medicine)  Karlene Leach MSW as  ([facility]) (Population Health)        Chief Complaint   Patient presents with    Hypertension   \"I cannot bring my blood pressure down\".    History Present Illness     A 59 years old gentleman with a past history of hypertension/atrial fibrillation/cerebellar CVA with ataxia and left upper extremity weakness/GERD/Lepe's esophagus/diverticulosis/peptic ulcer disease/mood disorder and memory deficit/dyslipidemia and left vertebral artery stenosis.  Patient has reported that his blood pressure continues to be elevated over the last 2 weeks and is not coming down easily.  He does describe blurred vision with death and lower extremity weakness leading him to fall.  Positive head trauma and one of the falls.  No loss of consciousness or dizziness.  Results positive of left-sided weakness.  No slurred speech.  No double vision or loss of the vision.  He does describe palpitation.  There is no chest pain.  No shortness of breath.  No PND or orthopnea.  No ankle edema.  No cough.  No wheeze.  No hemoptysis.  Patient reported that he came to the ER yesterday and was given medication to lower his blood pressure down and he was sent home.  He states that he has taken a total of 15 mg of Norvasc/10 mg of Coreg/80 mg of lisinopril/hydralazine hours.  Patient reports that last admission he was here approximately 2 weeks ago he was treated as an alcohol withdrawal with benzodiazepines and this has caused problems with mentation and level of consciousness and states that he would not want to take benzodiazepines or narcotics.  Patient states that he does not smoke.  Patient states that intake was 2 to 3 weeks ago.  He reports that he usually does not drink much approximately 1 drink per day before he quit completely for 3 weeks.        Review of Systems   .  Positive frequency and " urgency.  No dysuria or hematuria.  GI.  No abdominal pain.  No nausea or vomiting.  Normal bowel habits without constipation/diarrhea/bleeding per rectum/melena.  Psychiatry.  Positive anxiety but no sad moods.  No suicidal ideation.  CNS.  As above.  Cardiovascular.  As above.    Personal History     Past Medical History:   Diagnosis Date    ADHD (attention deficit hyperactivity disorder)     On going issue    Anxiety     Lepe's esophagus     Benign prostatic hyperplasia Surgery in 12/2021    Brain concussion 11/2023    Clotting disorder Intestinal    Depression     Diverticulitis     Diverticulosis     Duodenitis     Enteritis     Failure to thrive (child)     Family history of blood clots     Fracture of wrist 1985    Fracture, foot 2019    GERD (gastroesophageal reflux disease)     Hyperlipidemia     Hypertension     Hypertensive emergency     Irritable bowel syndrome 2017    Low testosterone     Microcytosis     Pancreatitis     Pneumonia     PONV (postoperative nausea and vomiting)     Seasonal affective disorder     Shoulder injury     Stroke     Unexplained weight loss     Visual impairment 8/2023     Past Surgical History:   Procedure Laterality Date    CARDIAC ELECTROPHYSIOLOGY PROCEDURE N/A 01/24/2024    Procedure: Loop insertion- Medtronic LINQ;  Surgeon: Kaela Walker MD;  Location: [facility] CATH INVASIVE LOC[facility];  Service: Cardiovascular;  Laterality: N/A;    COLON SURGERY      COLONOSCOPY      COLONOSCOPY N/A 12/11/2022    Procedure: COLONOSCOPY INTO CECUM WITH HOT SNARE POLYPECTOMY;  Surgeon: Albert Gallo MD;  [facility];  Service: Gastroenterology;  Laterality: N/A;  PRE- GI BLEED  POST- DIVERTICULOSIS, POLYP    COLONOSCOPY N/A 02/14/2024    Procedure: COLONOSCOPY into cecum/terminal ileum;  Surgeon: Albert Gallo MD;  [facility];  Service: Gastroenterology;  Laterality: N/A;  diverticulosis, hemorrhoids    ENDOSCOPY N/A 12/10/2022    Procedure:  ESOPHAGOGASTRODUODENOSCOPY;  Surgeon: Albert Gallo MD;  Location: [facility] ENDOSCOPY;  Service: Gastroenterology;  Laterality: N/A;  PRE- DARK STOOLS  POST- BARRETTS ESOPHAGUS    ENDOSCOPY N/A 02/14/2024    Procedure: ESOPHAGOGASTRODUODENOSCOPY with biopsy;  Surgeon: Albert Gallo MD;  Location: [facility] ENDOSCOPY;  Service: Gastroenterology;  Laterality: N/A;  long segment wilson's, hiatal hernia    FRACTURE SURGERY Right 1980    for broken arm    FRACTURE SURGERY Right     for broken hand    HAND SURGERY  1990    INCISION AND DRAINAGE ABSCESS  2015    anal    PROSTATE SURGERY      SHOULDER SURGERY  2022    SMALL INTESTINE SURGERY      TONSILLECTOMY      TRIGGER POINT INJECTION  2017     Family History   Problem Relation Age of Onset    Stroke Mother     Heart disease Mother     Stroke Father     Hyperlipidemia Father     Hypertension Father      Social History     Tobacco Use    Smoking status: Never    Smokeless tobacco: Never   Vaping Use    Vaping status: Never Used   Substance Use Topics    Alcohol use: Not Currently    Drug use: Not Currently     Frequency: 1.0 times per week     Current Facility-Administered Medications on File Prior to Encounter   Medication Dose Route Frequency Provider Last Rate Last Admin    [COMPLETED] labetalol (NORMODYNE,TRANDATE) injection 20 mg  20 mg Intravenous Once Enrique Flores MD   20 mg at 02/23/25 0055     Current Outpatient Medications on File Prior to Encounter   Medication Sig Dispense Refill    amLODIPine (NORVASC) 5 MG tablet Take 1 tablet by mouth Daily. 30 tablet 0    ASPIRIN 81 PO Take  by mouth. (Patient not taking: Reported on 1/25/2025)      atorvastatin (LIPITOR) 40 MG tablet Take 1 tablet by mouth Every Night. 30 tablet 0    carvedilol (COREG) 3.125 MG tablet Take 1 tablet by mouth 2 (Two) Times a Day With Meals. Pt unsure of dose      diazePAM (Valium) 2 MG tablet Take 1 tablet by mouth Every 12 (Twelve) Hours As Needed for Anxiety. 5 tablet 0    folic  acid (FOLVITE) 1 MG tablet Take 1 tablet by mouth Daily. 30 tablet 0    lisinopril (PRINIVIL,ZESTRIL) 40 MG tablet Take 1 tablet by mouth Daily for 30 doses. 30 tablet 0    multivitamin with minerals tablet tablet Take 1 tablet by mouth Daily.      pantoprazole (PROTONIX) 40 MG EC tablet Take 1 tablet by mouth Every Morning Before Breakfast. 30 tablet 0    thiamine (VITAMIN B1) 100 MG tablet Take 1 tablet by mouth Daily. 30 tablet 0    vilazodone (Viibryd) 40 MG tablet tablet Take 1 tablet by mouth Daily. 90 tablet 1    vitamin B-12 (CYANOCOBALAMIN) 500 MCG tablet Take 1 tablet by mouth Daily. (Patient not taking: Reported on 7/3/2024) 90 tablet 0     No Known Allergies    Objective    Objective     Vital Signs  Temp:  [97.2 °F (36.2 °C)-99.1 °F (37.3 °C)] 99.1 °F (37.3 °C)  Heart Rate:  [60-75] 67  Resp:  [16-18] 18  BP: (170-202)/(108-158) 172/119  SpO2:  [94 %-97 %] 96 %  on   ;   Device (Oxygen Therapy): room air  Body mass index is 28.87 kg/m².    Physical Exam  General.  Middle-aged gentleman.  He is alert and oriented x 4.  In no apparent pain/distress/diaphoresis.  Normal mood and affect.  Patient anxious.  Eyes.  Pupils equal round and reactive intact extraocular musculature.  No pallor or jaundice  Oral cavity.  Moist mucous membranes  Neck.  Supple.  No JVD.  No lymphadenopathy or thyromegaly  Cardio vascular.  Regular rate and rhythm with no gallops or murmurs  Chest.  Clear to auscultation bilaterally with no added sounds  Abdomen.  Soft lax.  No tenderness.  No organomegaly.  No guarding or rebound.  Extremities.  No clubbing/cyanosis/edema  CNS.  No acute focal neurological deficits.    Results Review:  I reviewed the patient's new clinical results.  I reviewed the patient's new imaging results and agree with the interpretation.  I reviewed the patient's other test results and agree with the interpretation  I personally viewed and interpreted the patient's EKG/Telemetry data  Discussed with ED  provider.    Lab Results (last 24 hours)       Procedure Component Value Units Date/Time    CBC & Differential [[id number]]  (Abnormal) Collected: 02/23/25 0057    Specimen: Blood Updated: 02/23/25 0111    Narrative:      The following orders were created for panel order CBC & Differential.  Procedure                               Abnormality         Status                     ---------                               -----------         ------                     CBC Auto Differential[[id number]]        Abnormal            Final result                 Please view results for these tests on the individual orders.    Comprehensive Metabolic Panel [[id number]]  (Abnormal) Collected: 02/23/25 0057    Specimen: Blood Updated: 02/23/25 0132     Glucose 115 mg/dL      BUN 15 mg/dL      Creatinine 0.83 mg/dL      Sodium 138 mmol/L      Potassium 4.1 mmol/L      Chloride 107 mmol/L      CO2 21.0 mmol/L      Calcium 9.3 mg/dL      Total Protein 7.4 g/dL      Albumin 4.4 g/dL      ALT (SGPT) 22 U/L      AST (SGOT) 17 U/L      Alkaline Phosphatase 75 U/L      Total Bilirubin 0.4 mg/dL      Globulin 3.0 gm/dL      A/G Ratio 1.5 g/dL      BUN/Creatinine Ratio 18.1     Anion Gap 10.0 mmol/L      eGFR 102.1 mL/min/1.73     Narrative:      GFR Categories in Chronic Kidney Disease (CKD)      GFR Category          GFR (mL/min/1.73)    Interpretation  G1                     90 or greater         Normal or high (1)  G2                      60-89                Mild decrease (1)  G3a                   45-59                Mild to moderate decrease  G3b                   30-44                Moderate to severe decrease  G4                    15-29                Severe decrease  G5                    14 or less           Kidney failure          (1)In the absence of evidence of kidney disease, neither GFR category G1 or G2 fulfill the criteria for CKD.    eGFR calculation 2021 CKD-EPI creatinine equation, which does not include race as a  factor    High Sensitivity Troponin T [[id number]]  (Normal) Collected: 02/23/25 0057    Specimen: Blood Updated: 02/23/25 0132     HS Troponin T <6 ng/L     Narrative:      High Sensitive Troponin T Reference Range:  <14.0 ng/L- Negative Female for AMI  <22.0 ng/L- Negative Male for AMI  >=14 - Abnormal Female indicating possible myocardial injury.  >=22 - Abnormal Male indicating possible myocardial injury.   Clinicians would have to utilize clinical acumen, EKG, Troponin, and serial changes to determine if it is an Acute Myocardial Infarction or myocardial injury due to an underlying chronic condition.         CBC Auto Differential [[id number]]  (Abnormal) Collected: 02/23/25 0057    Specimen: Blood Updated: 02/23/25 0111     WBC 7.95 10*3/mm3      RBC 5.53 10*6/mm3      Hemoglobin 15.7 g/dL      Hematocrit 47.2 %      MCV 85.4 fL      MCH 28.4 pg      MCHC 33.3 g/dL      RDW 14.2 %      RDW-SD 44.1 fl      MPV 9.7 fL      Platelets 236 10*3/mm3      Neutrophil % 60.6 %      Lymphocyte % 25.5 %      Monocyte % 7.2 %      Eosinophil % 6.2 %      Basophil % 0.4 %      Immature Grans % 0.1 %      Neutrophils, Absolute 4.82 10*3/mm3      Lymphocytes, Absolute 2.03 10*3/mm3      Monocytes, Absolute 0.57 10*3/mm3      Eosinophils, Absolute 0.49 10*3/mm3      Basophils, Absolute 0.03 10*3/mm3      Immature Grans, Absolute 0.01 10*3/mm3      nRBC 0.0 /100 WBC     High Sensitivity Troponin T [[id number]]  (Normal) Collected: 02/23/25 0952    Specimen: Blood Updated: 02/23/25 1023     HS Troponin T <6 ng/L     Narrative:      High Sensitive Troponin T Reference Range:  <14.0 ng/L- Negative Female for AMI  <22.0 ng/L- Negative Male for AMI  >=14 - Abnormal Female indicating possible myocardial injury.  >=22 - Abnormal Male indicating possible myocardial injury.   Clinicians would have to utilize clinical acumen, EKG, Troponin, and serial changes to determine if it is an Acute Myocardial Infarction or myocardial injury due to  an underlying chronic condition.         High Sensitivity Troponin T 1Hr [[id number]] Collected: 02/23/25 1051    Specimen: Blood Updated: 02/23/25 1123     HS Troponin T <6 ng/L      Troponin T Numeric Delta --     Comment: Unable to calculate.       Narrative:      High Sensitive Troponin T Reference Range:  <14.0 ng/L- Negative Female for AMI  <22.0 ng/L- Negative Male for AMI  >=14 - Abnormal Female indicating possible myocardial injury.  >=22 - Abnormal Male indicating possible myocardial injury.   Clinicians would have to utilize clinical acumen, EKG, Troponin, and serial changes to determine if it is an Acute Myocardial Infarction or myocardial injury due to an underlying chronic condition.                 Imaging Results (Last 24 Hours)       Procedure Component Value Units Date/Time    CT Head Without Contrast [[id number]] Collected: 02/23/25 1203     Updated: 02/23/25 1203    Narrative:      CT OF THE BRAIN WITHOUT CONTRAST 02/23/2025     HISTORY: Hypertension. Vision changes.     Axial images were obtained through the brain without intravenous  contrast.     The brain parenchyma and ventricular system appear within normal limits.  No mass lesions, midline shift, intracranial hemorrhage or evidence of  infarction is demonstrated. There is some distal left vertebral artery  calcification. Bony structures appear unremarkable.       Impression:      No acute process.      Radiation dose reduction techniques were utilized, including automated  exposure control and exposure modulation based on body size.                  Results for orders placed during the hospital encounter of 01/25/25    Adult Transthoracic Echo Complete W/ Cont if Necessary Per Protocol    Interpretation Summary    Left ventricular ejection fraction appears to be 56 - 60%.    The left ventricular cavity is borderline dilated.    Left ventricular diastolic function was normal.    Estimated right ventricular systolic pressure from tricuspid  regurgitation is normal (<35 mmHg).      No orders to display            Assessment/Plan     Active Hospital Problems    Diagnosis  POA    **Hypertensive urgency [I16.0]  Yes      Resolved Hospital Problems   No resolved problems to display.           Hypertensive urgency in a patient with a history of atrial fibrillation and hypertension.  Cardiovascular examination is unremarkable.  CNS examination is unremarkable.  Patient states that he has received 80 mg of lisinopril and 20 mg of Norvasc and 200 mg of hydralazine and 9.75 mg of Coreg over the last 24 hours.  At this time I will stop Norvasc continue Coreg at the higher dose.  Continue maximum dose of lisinopril and initiate  Cardene drip.  Patient troponins x 3 is negative.  CT scan of the brain without acute disease.  Chest x-ray with no pulmonary edema or significant cardiomegaly.  EKG with normal sinus rhythm.  Patient last echo on 1/2025 revealing a normal ejection fraction with mild MR.  Will rule out secondary causes of hypertension by checking TSH/aldosterone level/VMA, serotonin ,catecholamines in the urine/renal arterial duplex.  Titrate Cardene drip.  Consult cardiology.  History of cerebellar CVA leading to ataxia/hypercholesterolemia/left vertebral artery stenosis.  CNS examination without acute deficit.  CT scan of the brain without acute abnormalities.  Continue aspirin and Lipitor.  GERD/Lepe's/peptic ulcer disease/diverticular disease.  Benign GI examination.  Continue Protonix.  Mood disorder/anxiety and memory deficit.  Continue Viibryd.  Initiate BuSpar.  Patient wants to avoid any benzodiazepines  Hyperglycemia.  Check A1c.  History of alcohol use..  Patient states that he stopped alcohol completely 2 to 3 weeks ago.  He also states that he used to drink an average of 1 drink per day.  He absolutely declines benzodiazepine and any CIWA protocol.  I do not believe that he is in any withdrawal at this time  VTE prophylaxis.  Sequential  compression devices      Discussed my findings and plan of treatment with the patient/ER provider.    Code Status -full code.       Ester Bright MD  [facility]  02/23/25  15:10 EST     Electronically signed by Ester Bright MD at 02/23/25 1551          Emergency Department Notes            Bladimir Bliss MD at 02/23/25 0947           EMERGENCY DEPARTMENT ENCOUNTER    Room Number:  26/26  PCP: Akash Rodriguez Jr., DO  Historian: Patient      HPI:  Chief Complaint: Uncontrolled blood pressure, blurry vision  A complete HPI/ROS/PMH/PSH/SH/FH are unobtainable due to: None    Context: Flako Coreas Sr. is a 59 y.o. male who presents to the ED via [facility] from home c/o acute on chronic uncontrolled blood pressure, blurry vision.  Patient states that was seen here last night and discharged but blood pressure remains elevated.  Denies any chest pain or shortness of breath.  History of strokes in the past that he has some residual, tremor and difficulty with certain control body movements at times, states that this seems to be getting worse recently.  Reports occasional alcohol use, last drink was 2 weeks ago.  States that he does not drink regularly.       MEDICAL RECORD REVIEW    External (non-ED) record review: MRI brain with and without contrast January 23, 2025 showed mild nonspecific white matter changes with no convincing signs of interval change since February 12, 2024.              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    Anxiety about health 02/26/2024    Vertebral artery stenosis, left 02/26/2024    Memory loss 02/26/2024    History of alcohol dependence 02/26/2024    Vasovagal syncope 03/15/2024    Elevated lipoprotein(a) 04/03/2024    Elevated coronary artery calcium score 04/03/2024    PAF (paroxysmal atrial fibrillation) 07/03/2024    Syncope 01/25/2025    History of atrial fibrillation 01/25/2025    Alcohol use 01/25/2025    Mood disorder 01/25/2025    Alcohol withdrawal 01/27/2025     Resolved Ambulatory Problems     Diagnosis Date Noted    Essential hypertension 06/26/2017    Abdominal pain 06/20/2018    Generalized abdominal pain 12/08/2022    History of esophagitis 12/08/2022    Gastrointestinal hemorrhage 12/08/2022    Acute CVA (cerebrovascular accident) 08/17/2023    Hypertensive emergency 08/19/2023    Ataxia 08/19/2023    CVA (cerebral vascular accident) 08/22/2023    Acute CVA (cerebrovascular accident) 10/23/2023    TIA (transient ischemic attack) 10/26/2023    Concussion 10/27/2023    Fall 10/27/2023    Head injury, closed, with concussion 10/27/2023    CHI (closed head injury), initial encounter 12/10/2023    Recurrent syncope 01/19/2024    Closed head injury 02/12/2024    Hospital discharge follow-up 02/26/2024    Dizziness 02/26/2024    ALLISON (acute kidney injury) 03/15/2024    Hypertensive emergency 01/25/2025     Past Medical History:   Diagnosis Date    ADHD (attention deficit hyperactivity disorder)     Anxiety     Benign prostatic hyperplasia Surgery in 12/2021    Brain concussion 11/2023    Clotting disorder Intestinal    Depression     Diverticulitis     Duodenitis     Enteritis     Failure to thrive (child)     Family history of blood clots     Fracture of wrist 1985    Fracture, foot 2019    GERD (gastroesophageal reflux disease)     Hypertension     Irritable bowel syndrome 2017     Low testosterone     Microcytosis     Pancreatitis     Pneumonia     PONV (postoperative nausea and vomiting)     Seasonal affective disorder     Shoulder injury     Stroke     Unexplained weight loss     Visual impairment 8/2023         PAST SURGICAL HISTORY  Past Surgical History:   Procedure Laterality Date    CARDIAC ELECTROPHYSIOLOGY PROCEDURE N/A 01/24/2024    Procedure: Loop insertion- Medtronic LINQ;  Surgeon: Kaela Walker MD;  Location: [facility] CATH INVASIVE LOCATION;  Service: Cardiovascular;  Laterality: N/A;    COLON SURGERY      COLONOSCOPY      COLONOSCOPY N/A 12/11/2022    Procedure: COLONOSCOPY INTO CECUM WITH HOT SNARE POLYPECTOMY;  Surgeon: Albert Gallo MD;  Location: [facility] ENDOSCOPY;  Service: Gastroenterology;  Laterality: N/A;  PRE- GI BLEED  POST- DIVERTICULOSIS, POLYP    COLONOSCOPY N/A 02/14/2024    Procedure: COLONOSCOPY into cecum/terminal ileum;  Surgeon: Albert Gallo MD;  Location: [facility] ENDOSCOPY;  Service: Gastroenterology;  Laterality: N/A;  diverticulosis, hemorrhoids    ENDOSCOPY N/A 12/10/2022    Procedure: ESOPHAGOGASTRODUODENOSCOPY;  Surgeon: Albert Gallo MD;  Location: [facility] ENDOSCOPY;  Service: Gastroenterology;  Laterality: N/A;  PRE- DARK STOOLS  POST- BARRETTS ESOPHAGUS    ENDOSCOPY N/A 02/14/2024    Procedure: ESOPHAGOGASTRODUODENOSCOPY with biopsy;  Surgeon: Albert Gallo MD;  Location: [facility] ENDOSCOPY;  Service: Gastroenterology;  Laterality: N/A;  long segment wilson's, hiatal hernia    FRACTURE SURGERY Right 1980    for broken arm    FRACTURE SURGERY Right     for broken hand    HAND SURGERY  1990    INCISION AND DRAINAGE ABSCESS  2015    anal    PROSTATE SURGERY      SHOULDER SURGERY  2022    SMALL INTESTINE SURGERY      TONSILLECTOMY      TRIGGER POINT INJECTION  2017         FAMILY HISTORY  Family History   Problem Relation Age of Onset    Stroke Mother     Heart disease Mother     Stroke Father     Hyperlipidemia Father     Hypertension Father           SOCIAL HISTORY  Social History     Socioeconomic History    Marital status: Single   Tobacco Use    Smoking status: Never    Smokeless tobacco: Never   Vaping Use    Vaping status: Never Used   Substance and Sexual Activity    Alcohol use: Not Currently     Comment: 12 beers on the weekend    Drug use: Not Currently     Frequency: 1.0 times per week     Types: Marijuana     Comment: patient states he has been smoking marijuania daily x 2 weeks    Sexual activity: Not Currently     Partners: Female     Birth control/protection: Other, Birth control pill         ALLERGIES  Patient has no known allergies.        REVIEW OF SYSTEMS  Review of Systems     All systems reviewed and negative except for those discussed in HPI.       PHYSICAL EXAM    I have reviewed the triage vital signs and nursing notes.    ED Triage Vitals [02/23/25 0629]   Temp Heart Rate Resp BP SpO2   98.9 °F (37.2 °C) 75 18 (!) 190/135 97 %      Temp src Heart Rate Source Patient Position BP Location FiO2 (%)   Oral Monitor Lying Right arm --       Physical Exam  General: No acute distress, nontoxic  HEENT: Mucous membranes moist, atraumatic, EOMI  Neck: Full ROM  Pulm: Symmetric chest rise, nonlabored, lungs CTAB  Cardiovascular: Regular rate and rhythm, intact distal pulses  GI: Soft, nontender, nondistended, no rebound, no guarding, bowel sounds present  MSK: Full ROM, no deformity  Skin: Warm, dry  Neuro: Awake, alert, oriented x 4, mild generalized tremor, no facial droop, no significant dysarthria or aphasia, slight ataxia with the hands bilaterally, GCS 15, moving all extremities, no focal deficits  Psych: Calm, cooperative        LAB RESULTS  Recent Results (from the past 24 hours)   ECG 12 Lead Other; HTN    Collection Time: 02/23/25 12:52 AM   Result Value Ref Range    QT Interval 388 ms    QTC Interval 414 ms   Comprehensive Metabolic Panel    Collection Time: 02/23/25 12:57 AM    Specimen: Blood   Result Value Ref Range    Glucose  115 (H) 65 - 99 mg/dL    BUN 15 6 - 20 mg/dL    Creatinine 0.83 0.76 - 1.27 mg/dL    Sodium 138 136 - 145 mmol/L    Potassium 4.1 3.5 - 5.2 mmol/L    Chloride 107 98 - 107 mmol/L    CO2 21.0 (L) 22.0 - 29.0 mmol/L    Calcium 9.3 8.6 - 10.5 mg/dL    Total Protein 7.4 6.0 - 8.5 g/dL    Albumin 4.4 3.5 - 5.2 g/dL    ALT (SGPT) 22 1 - 41 U/L    AST (SGOT) 17 1 - 40 U/L    Alkaline Phosphatase 75 39 - 117 U/L    Total Bilirubin 0.4 0.0 - 1.2 mg/dL    Globulin 3.0 gm/dL    A/G Ratio 1.5 g/dL    BUN/Creatinine Ratio 18.1 7.0 - 25.0    Anion Gap 10.0 5.0 - 15.0 mmol/L    eGFR 102.1 >60.0 mL/min/1.73   High Sensitivity Troponin T    Collection Time: 02/23/25 12:57 AM    Specimen: Blood   Result Value Ref Range    HS Troponin T <6 <22 ng/L   CBC Auto Differential    Collection Time: 02/23/25 12:57 AM    Specimen: Blood   Result Value Ref Range    WBC 7.95 3.40 - 10.80 10*3/mm3    RBC 5.53 4.14 - 5.80 10*6/mm3    Hemoglobin 15.7 13.0 - 17.7 g/dL    Hematocrit 47.2 37.5 - 51.0 %    MCV 85.4 79.0 - 97.0 fL    MCH 28.4 26.6 - 33.0 pg    MCHC 33.3 31.5 - 35.7 g/dL    RDW 14.2 12.3 - 15.4 %    RDW-SD 44.1 37.0 - 54.0 fl    MPV 9.7 6.0 - 12.0 fL    Platelets 236 140 - 450 10*3/mm3    Neutrophil % 60.6 42.7 - 76.0 %    Lymphocyte % 25.5 19.6 - 45.3 %    Monocyte % 7.2 5.0 - 12.0 %    Eosinophil % 6.2 0.3 - 6.2 %    Basophil % 0.4 0.0 - 1.5 %    Immature Grans % 0.1 0.0 - 0.5 %    Neutrophils, Absolute 4.82 1.70 - 7.00 10*3/mm3    Lymphocytes, Absolute 2.03 0.70 - 3.10 10*3/mm3    Monocytes, Absolute 0.57 0.10 - 0.90 10*3/mm3    Eosinophils, Absolute 0.49 (H) 0.00 - 0.40 10*3/mm3    Basophils, Absolute 0.03 0.00 - 0.20 10*3/mm3    Immature Grans, Absolute 0.01 0.00 - 0.05 10*3/mm3    nRBC 0.0 0.0 - 0.2 /100 WBC   Gold Top - SST    Collection Time: 02/23/25 12:57 AM   Result Value Ref Range    Extra Tube Hold for add-ons.    Gray Top    Collection Time: 02/23/25 12:57 AM   Result Value Ref Range    Extra Tube Hold for add-ons.    Light  Blue Top    Collection Time: 02/23/25 12:57 AM   Result Value Ref Range    Extra Tube Hold for add-ons.    High Sensitivity Troponin T    Collection Time: 02/23/25  9:52 AM    Specimen: Blood   Result Value Ref Range    HS Troponin T <6 <22 ng/L   High Sensitivity Troponin T 1Hr    Collection Time: 02/23/25 10:51 AM    Specimen: Blood   Result Value Ref Range    HS Troponin T <6 <22 ng/L    Troponin T Numeric Delta         Ordered the above labs and independently interpreted results. My findings will be discussed in the medical decision making section below        RADIOLOGY  CT Head Without Contrast    Result Date: 2/23/2025  CT OF THE BRAIN WITHOUT CONTRAST 02/23/2025  HISTORY: Hypertension. Vision changes.  Axial images were obtained through the brain without intravenous contrast.  The brain parenchyma and ventricular system appear within normal limits. No mass lesions, midline shift, intracranial hemorrhage or evidence of infarction is demonstrated. There is some distal left vertebral artery calcification. Bony structures appear unremarkable.      No acute process.  Radiation dose reduction techniques were utilized, including automated exposure control and exposure modulation based on body size.       XR Chest 1 View    Result Date: 2/23/2025  CXR ONE VIEW  HISTORY: HTN  COMPARISON: 1/26/2025  TECHNIQUE: single portable AP       The heart size is within normal limits.  The lungs are normally aerated. There is no pleural effusion or pneumothorax.    This report was finalized on 2/23/2025 12:59 AM by Dr. Rik Ash M.D on Workstation: [id number]       Ordered the above noted radiological studies.  Independently interpreted by me and my independent review of findings can be found in the ED Course.  See dictation for official radiology interpretation.      PROCEDURES    Procedures        MEDICATIONS GIVEN IN ER    Medications   labetalol (NORMODYNE,TRANDATE) injection 20 mg (20 mg Intravenous Given 2/23/25  0952)   amLODIPine (NORVASC) tablet 5 mg (5 mg Oral Given 2/23/25 1159)         PROGRESS, DATA ANALYSIS, CONSULTS, AND MEDICAL DECISION MAKING    Please note that this section constitutes my independent interpretation of clinical data including lab results, radiology, EKG's.  This constitutes my independent professional opinion regarding differential diagnosis and management of this patient.  It may include any factors such as history from outside sources, review of external records, social determinants of health, management of medications, response to those treatments, and discussions with other providers.    Differential Diagnosis and Plan: Plan for CT head, blood pressure control, and hospitalization for ongoing blood pressure management given blurry vision related to his blood pressure currently.  May need MRI brain and may need further optimization of his current blood pressure medication regimen.    Additional sources:  - Discussed/ obtained information from independent historians:       - (Social Determinants of Health): None     - Shared decision making:  Patient fully updated on and in agreement with the course and plan moving forward    ED Course as of 02/23/25 1230   Sun Feb 23, 2025   1203 CT Head Without Contrast  Radiology report reviewed, no evidence of any acute emergent findings [DC]   1205 HS Troponin T: <6 [DC]   1205 Plan for hospitalization for hypertensive urgency, was given IV labetalol here with transient improvement but still running quite high both systolic and diastolic.  Will give an additional 5 mg dose of amlodipine orally in addition to what he took this morning.   [DC]   1217 Discussed with Dr. Bright, [facility], discussed patient's clinical course and findings today, treatment modalities, and need for hospitalization. [DC]      ED Course User Index  [DC] Bladimir Bliss MD       Hospitalization Considered?: yes    Orders Placed During This Visit:  Orders Placed This Encounter   Procedures  "   CT Head Without Contrast    High Sensitivity Troponin T    High Sensitivity Troponin T 1Hr    [facility] (on-call MD unless specified) Details    Initiate Observation Status       Additional orders considered but not placed:      Independent interpretation of labs, radiology studies, and discussions with consultants: See ED Course        AS OF 12:30 EST VITALS:    BP - (!) 188/129  HR - 61  TEMP - 98.9 °F (37.2 °C) (Oral)  02 SATS - 94%          DIAGNOSIS  Final diagnoses:   Hypertensive urgency   Severe uncontrolled hypertension   Blurry vision   History of CVA (cerebrovascular accident)         DISPOSITION  ED Disposition       ED Disposition   Decision to Admit    Condition   --    Comment   Level of Care: Telemetry [5]   Diagnosis: Hypertensive urgency [369973]   Admitting Physician: CARMEN DHILLON [[id number]]   Attending Physician: CARMEN DHILLON [[id number]]   Is patient appropriate for Inpatient Observation Unit?: No [0]                  Please note that portions of this document were completed with a voice recognition program.    Note Disclaimer: At [facility], we believe that sharing information builds trust and better relationships. You are receiving this note because you recently visited [facility]. It is possible you will see health information before a provider has talked with you about it. This kind of information can be easy to misunderstand. To help you fully understand what it means for your health, we urge you to discuss this note with your provider.                 Bladimir Bliss MD  02/23/25 1230      Electronically signed by Bladimir Bliss MD at 02/23/25 1230       Saúl King RN at 02/23/25 5757          Pt states he has had \"80 mg of lisinopril, 1000 mg carvedilol, 20 mg amlodipine, 200 mg of hydralazine\" in the last 24 hours. States none of it has worked.    Electronically signed by Saúl King, RN at 02/23/25 6342       Vital Signs       Date/Time Temp Temp src Pulse Resp BP " Patient Position SpO2    02/27/25 1009 -- -- 65 -- 167/97 Lying 94    02/27/25 0816 98.6 (37) Oral 62 18 162/98 Lying 96    02/26/25 2252 98.6 (37) Oral 61 18 123/90 Lying 96    02/26/25 1955 98.6 (37) Oral 64 18 141/95 Lying 94    02/26/25 1724 -- -- 64 -- 152/92 -- --    02/26/25 1339 -- -- -- -- --  -- --    BP: just came back from a walk at 02/26/25 1339    02/26/25 1333 98.2 (36.8) Oral 73 18 147/88 Lying 91    02/26/25 1018 -- -- 72 -- -- -- --    02/26/25 1017 -- -- 67 -- 139/97 -- --    02/26/25 0727 98.8 (37.1) Oral 68 18 162/103 Lying 96    02/26/25 0223 -- -- -- -- 148/82 Lying --    02/26/25 0127 -- -- -- -- 151/102 Lying --    02/25/25 2355 98.4 (36.9) Oral 57 18 158/105 Lying 97    02/25/25 1945 97.7 (36.5) Oral 54 18 132/92 Lying 94    02/25/25 1805 -- -- -- -- 158/99 -- --    02/25/25 1709 -- -- 89 -- 180/121 -- --    02/25/25 1433 99.4 (37.4) Oral 69 18 144/96 Lying 97    02/25/25 1341 98.9 (37.2) Oral 74 18 149/107 Lying 98    02/25/25 1225 -- -- 65 -- 152/100 -- --    02/25/25 1135 -- -- -- -- 142/117  -- --    BP: after walking with pt at 02/25/25 1135    02/25/25 0725 98.8 (37.1) Oral 70 18 134/98 Lying --    02/25/25 0000 -- -- 50 -- 125/73 -- 92    02/24/25 2347 98.4 (36.9) Oral 50 18 129/86 Lying 92    02/24/25 2200 -- -- 54 -- 150/99 -- 91    02/24/25 2000 -- -- 60 -- 121/79 Lying 91    02/24/25 1954 97.7 (36.5) Oral 59 18 -- Lying 93    02/24/25 1800 -- -- 64 -- 153/103 -- 95    02/24/25 1600 -- -- 60 -- 147/100 -- 94    02/24/25 1500 -- -- 57 -- 156/83 -- 94    02/24/25 1400 -- -- 59 -- 149/92 -- 95    02/24/25 1353 98.4 (36.9) Oral 61 16 154/96 Lying 97    02/24/25 1319 -- -- 70 -- 148/109 -- --    02/24/25 1300 -- -- 59 -- 148/104 -- 93    02/24/25 1120 -- -- 72 -- 144/109 -- 95    02/24/25 1000 -- -- 60 -- 147/98 -- 96    02/24/25 0912 99 (37.2) Oral 63 16 -- -- 96    02/24/25 0911 -- -- -- -- 179/112 Lying --    02/24/25 0546 -- -- -- 16 130/96 Lying --    02/24/25 0300 -- -- 55 --  124/73 Lying 90    02/24/25 0200 -- -- 60 -- 131/97 Lying 92    02/24/25 0100 -- -- 64 -- 126/96 Lying 93    02/24/25 0000 -- -- 58 -- 121/93 Lying 94    02/23/25 2300 97.3 (36.3) Oral 57 18 118/94 Lying 96    02/23/25 2202 -- -- 60 -- 124/74 -- --    02/23/25 2200 -- -- 58 -- 124/74 Lying 89    02/23/25 2100 -- -- 68 -- 123/84 Lying 93    02/23/25 2000 -- -- 65 -- 122/84 Lying 94    02/23/25 1945 97.7 (36.5) Oral 67 17 130/85 Lying 90    02/23/25 1915 -- -- 67 -- 126/74 -- 91    02/23/25 1900 -- -- 66 -- 113/79 -- 96    02/23/25 1845 -- -- 69 -- 130/79 -- 93    02/23/25 1830 -- -- 71 -- 127/75 -- 93    02/23/25 1815 -- -- 83 -- 142/92 -- 91    02/23/25 1800 -- -- 83 -- 145/102 -- 96    02/23/25 1700 -- -- 97 -- 137/123 -- 91    02/23/25 1600 -- -- 84 -- 155/129 -- 93    02/23/25 1500 -- -- 70 -- 162/107 -- 96    02/23/25 1445 99.1 (37.3) Oral 67 18 172/119 Lying 96    02/23/25 1400 -- -- 62 -- 174/139 -- 96    02/23/25 1300 -- -- 66 -- 178/120 -- 95    02/23/25 1129 -- -- 61 -- 188/129 -- 94    02/23/25 1031 -- -- 60 -- 185/115 -- 94    02/23/25 0952 -- -- 67 -- 197/135 -- --    02/23/25 0832 -- -- 64 -- 171/123 -- 95    02/23/25 08:09:31 -- -- -- -- 176/119 -- --    02/23/25 0629 98.9 (37.2) Oral 75 18 190/135 Lying 97          Oxygen Therapy (since admission)       Date/Time SpO2 Device (Oxygen Therapy) Flow (L/min) (Oxygen Therapy) Oxygen Concentration (%) ETCO2 (mmHg)    02/27/25 0816 96 room air -- -- --    02/27/25 0010 -- room air -- -- --    02/26/25 2252 96 room air -- -- --    02/26/25 2030 -- room air -- -- --    02/26/25 1955 94 room air -- -- --    02/26/25 1432 -- room air -- -- --    02/26/25 1333 91 room air -- -- --    02/26/25 0800 -- room air -- -- --    02/25/25 2355 97 room air -- -- --    02/25/25 2020 -- room air -- -- --    02/25/25 1945 94 room air -- -- --    02/25/25 1433 97 room air -- -- --    02/25/25 1341 98 room air -- -- --    02/25/25 0815 -- room air -- -- --    02/25/25 0725 --  room air -- -- --    02/25/25 0015 -- room air -- -- --    02/24/25 2347 92 room air -- -- --    02/24/25 2000 91 -- -- -- --    02/24/25 1954 93 room air -- -- --    02/24/25 1600 94 -- -- -- --    02/24/25 1400 95 -- -- -- --    02/24/25 1353 97 room air -- -- --    02/24/25 1300 93 -- -- -- --    02/24/25 1120 95 -- -- -- --    02/24/25 1000 96 -- -- -- --    02/24/25 0912 96 room air -- -- --    02/24/25 0546 -- room air -- -- --    02/24/25 0000 94 room air -- -- --    02/23/25 2300 96 room air -- -- --    02/23/25 2200 89 room air -- -- --    02/23/25 2000 94 room air -- -- --    02/23/25 1900 96 -- -- -- --    02/23/25 1600 93 -- -- -- --    02/23/25 1500 96 -- -- -- --    02/23/25 1456 -- room air -- -- --    02/23/25 1445 96 room air -- -- --    02/23/25 0832 95 -- -- -- --    02/23/25 0629 97 room air -- -- --          Intake & Output (last 5 days)         02/22 0701 02/23 0700 02/23 0701 02/24 0700 02/24 0701 02/25 0700 02/25 0701 02/26 0700 02/26 0701 02/27 0700 02/27 0701 02/28 0700    P.O.  320 222 360 0 240    Total Intake(mL/kg)  320 (3.1) 222 (2.2) 360 (3.5) 0 (0) 240 (2.4)    Urine (mL/kg/hr)  800 (0.3) 750 (0.3) 450 (0.2)  225 (0.6)    Stool    0      Total Output  800 750 450  225    Net  -480 -528 -90 0 +15              Urine Unmeasured Occurrence  0 x 1 x 1 x 1 x     Stool Unmeasured Occurrence    2 x 3 x           Lines, Drains & Airways       Active LDAs       Name Placement date Placement time Site Days    Peripheral IV 02/23/25 0950 Left Antecubital 02/23/25  0950  Antecubital  4                  CIWA (since admission)        Date/Time CIWA-Ar Total    02/27/25 0832 1     02/24/25 0931 1     02/23/25 1503 1                   Current Facility-Administered Medications   Medication Dose Route Frequency Provider Last Rate Last Admin    acetaminophen (TYLENOL) tablet 650 mg  650 mg Oral Q4H PRN Ester Bright MD   650 mg at 02/26/25 1215    Or    acetaminophen (TYLENOL) 160 MG/5ML oral  solution 650 mg  650 mg Oral Q4H PRN Ester Bright MD        Or    acetaminophen (TYLENOL) suppository 650 mg  650 mg Rectal Q4H PRN Ester Bright MD        amLODIPine (NORVASC) tablet 10 mg  10 mg Oral Q24H Ester Bright MD   10 mg at 02/27/25 0829    aspirin EC tablet 81 mg  81 mg Oral Daily Ester Bright MD   81 mg at 02/27/25 0829    atorvastatin (LIPITOR) tablet 40 mg  40 mg Oral Nightly Ester Bright MD   40 mg at 02/26/25 2031    sennosides-docusate (PERICOLACE) 8.6-50 MG per tablet 2 tablet  2 tablet Oral BID Ester Bright MD   2 tablet at 02/27/25 0829    And    polyethylene glycol (MIRALAX) packet 17 g  17 g Oral Daily PRN Ester Bright MD        And    bisacodyl (DULCOLAX) EC tablet 5 mg  5 mg Oral Daily PRN Ester Bright MD        And    bisacodyl (DULCOLAX) suppository 10 mg  10 mg Rectal Daily PRN Ester Bright MD        busPIRone (BUSPAR) tablet 10 mg  10 mg Oral Q8H Ester Bright MD   10 mg at 02/27/25 0652    Calcium Replacement - Follow Nurse / BPA Driven Protocol   Not Applicable PRN Ester Bright MD        carvedilol (COREG) tablet 12.5 mg  12.5 mg Oral BID With Meals Ester Bright MD   12.5 mg at 02/27/25 0829    fluticasone (FLONASE) 50 MCG/ACT nasal spray 2 spray  2 spray Each Nare Daily Marcy Hillman APRN   2 spray at 02/27/25 0831    folic acid (FOLVITE) tablet 1 mg  1 mg Oral Daily Ester Bright MD   1 mg at 02/27/25 0829    hydrOXYzine (ATARAX) tablet 50 mg  50 mg Oral TID PRN Ester Bright MD   50 mg at 02/25/25 1630    Lidocaine 4 % 2 patch  2 patch Transdermal Q24H Steffen Preciado APRN   2 patch at 02/26/25 0802    lisinopril (PRINIVIL,ZESTRIL) tablet 40 mg  40 mg Oral Daily Ester Bright MD   40 mg at 02/27/25 0828    Magnesium Standard Dose Replacement - Follow Nurse / BPA Driven Protocol   Not Applicable PRN Ester Bright MD        melatonin tablet 5 mg  5 mg Oral Nightly PRN Ester Bright MD   5 mg at  02/23/25 2200    multivitamin with minerals 1 tablet  1 tablet Oral Daily Ester Bright MD   1 tablet at 02/27/25 0829    nitroglycerin (NITROSTAT) SL tablet 0.4 mg  0.4 mg Sublingual Q5 Min PRN Ester Bright MD        ondansetron ODT (ZOFRAN-ODT) disintegrating tablet 4 mg  4 mg Oral Q6H PRN Ester Bright MD        Or    ondansetron (ZOFRAN) injection 4 mg  4 mg Intravenous Q6H PRN Ester Bright MD        pantoprazole (PROTONIX) EC tablet 40 mg  40 mg Oral QAM AC Ester Bright MD   40 mg at 02/27/25 0652    Phosphorus Replacement - Follow Nurse / BPA Driven Protocol   Not Applicable Ester Martinez MD        Potassium Replacement - Follow Nurse / BPA Driven Protocol   Not Applicable Ester Martinez MD        sodium chloride 0.9 % flush 10 mL  10 mL Intravenous Q12H Ester Bright MD   10 mL at 02/27/25 0831    sodium chloride 0.9 % flush 10 mL  10 mL Intravenous PRN Ester Bright MD        sodium chloride 0.9 % infusion 40 mL  40 mL Intravenous GLENROYN Ester Bright MD        spironolactone (ALDACTONE) tablet 50 mg  50 mg Oral Daily Gin Barron APRN   50 mg at 02/27/25 0829    thiamine (VITAMIN B-1) tablet 100 mg  100 mg Oral Daily Ester Bright MD   100 mg at 02/27/25 0829    vilazodone (VIIBRYD) tablet 40 mg  40 mg Oral Daily Ester Bright MD   40 mg at 02/27/25 0828     Lab Results (all)       Procedure Component Value Units Date/Time    CBC & Differential [[id number]]  (Abnormal) Collected: 02/27/25 0312    Specimen: Blood Updated: 02/27/25 0353    Narrative:      The following orders were created for panel order CBC & Differential.  Procedure                               Abnormality         Status                     ---------                               -----------         ------                     CBC Auto Differential[[id number]]        Abnormal            Final result                 Please view results for these tests on the individual orders.    CBC  Auto Differential [[id number]]  (Abnormal) Collected: 02/27/25 0312    Specimen: Blood Updated: 02/27/25 0353     WBC 7.20 10*3/mm3      RBC 5.16 10*6/mm3      Hemoglobin 14.6 g/dL      Hematocrit 44.3 %      MCV 85.9 fL      MCH 28.3 pg      MCHC 33.0 g/dL      RDW 13.6 %      RDW-SD 42.8 fl      MPV 10.2 fL      Platelets 228 10*3/mm3      Neutrophil % 55.0 %      Lymphocyte % 28.9 %      Monocyte % 6.9 %      Eosinophil % 8.3 %      Basophil % 0.6 %      Immature Grans % 0.3 %      Neutrophils, Absolute 3.96 10*3/mm3      Lymphocytes, Absolute 2.08 10*3/mm3      Monocytes, Absolute 0.50 10*3/mm3      Eosinophils, Absolute 0.60 10*3/mm3      Basophils, Absolute 0.04 10*3/mm3      Immature Grans, Absolute 0.02 10*3/mm3      nRBC 0.0 /100 WBC     Basic Metabolic Panel [[id number]]  (Abnormal) Collected: 02/27/25 0312    Specimen: Blood Updated: 02/27/25 0343     Glucose 97 mg/dL      BUN 10 mg/dL      Creatinine 0.81 mg/dL      Sodium 145 mmol/L      Potassium 4.5 mmol/L      Chloride 115 mmol/L      CO2 20.0 mmol/L      Calcium 8.7 mg/dL      BUN/Creatinine Ratio 12.3     Anion Gap 10.0 mmol/L      eGFR 102.8 mL/min/1.73     Narrative:      GFR Categories in Chronic Kidney Disease (CKD)      GFR Category          GFR (mL/min/1.73)    Interpretation  G1                     90 or greater         Normal or high (1)  G2                      60-89                Mild decrease (1)  G3a                   45-59                Mild to moderate decrease  G3b                   30-44                Moderate to severe decrease  G4                    15-29                Severe decrease  G5                    14 or less           Kidney failure          (1)In the absence of evidence of kidney disease, neither GFR category G1 or G2 fulfill the criteria for CKD.    eGFR calculation 2021 CKD-EPI creatinine equation, which does not include race as a factor    Metanephrines, Frac. Free, Plasma [[id number]] Collected: 02/27/25 0312     Specimen: Blood Updated: 02/27/25 0320    Potassium [[id number]]  (Normal) Collected: 02/26/25 1619    Specimen: Blood Updated: 02/26/25 1713     Potassium 3.5 mmol/L     Basic Metabolic Panel [[id number]]  (Abnormal) Collected: 02/26/25 0350    Specimen: Blood Updated: 02/26/25 0439     Glucose 107 mg/dL      BUN 14 mg/dL      Creatinine 0.86 mg/dL      Sodium 141 mmol/L      Potassium 3.2 mmol/L      Chloride 111 mmol/L      CO2 20.5 mmol/L      Calcium 8.7 mg/dL      BUN/Creatinine Ratio 16.3     Anion Gap 9.5 mmol/L      eGFR 101.0 mL/min/1.73     Narrative:      GFR Categories in Chronic Kidney Disease (CKD)      GFR Category          GFR (mL/min/1.73)    Interpretation  G1                     90 or greater         Normal or high (1)  G2                      60-89                Mild decrease (1)  G3a                   45-59                Mild to moderate decrease  G3b                   30-44                Moderate to severe decrease  G4                    15-29                Severe decrease  G5                    14 or less           Kidney failure          (1)In the absence of evidence of kidney disease, neither GFR category G1 or G2 fulfill the criteria for CKD.    eGFR calculation 2021 CKD-EPI creatinine equation, which does not include race as a factor    CBC & Differential [[id number]]  (Abnormal) Collected: 02/26/25 0350    Specimen: Blood Updated: 02/26/25 0406    Narrative:      The following orders were created for panel order CBC & Differential.  Procedure                               Abnormality         Status                     ---------                               -----------         ------                     CBC Auto Differential[[id number]]        Abnormal            Final result                 Please view results for these tests on the individual orders.    CBC Auto Differential [[id number]]  (Abnormal) Collected: 02/26/25 0350    Specimen: Blood Updated: 02/26/25 0406     WBC 8.17 10*3/mm3       RBC 5.34 10*6/mm3      Hemoglobin 15.1 g/dL      Hematocrit 46.2 %      MCV 86.5 fL      MCH 28.3 pg      MCHC 32.7 g/dL      RDW 13.9 %      RDW-SD 44.0 fl      MPV 10.0 fL      Platelets 228 10*3/mm3      Neutrophil % 58.4 %      Lymphocyte % 24.0 %      Monocyte % 9.2 %      Eosinophil % 7.8 %      Basophil % 0.4 %      Immature Grans % 0.2 %      Neutrophils, Absolute 4.77 10*3/mm3      Lymphocytes, Absolute 1.96 10*3/mm3      Monocytes, Absolute 0.75 10*3/mm3      Eosinophils, Absolute 0.64 10*3/mm3      Basophils, Absolute 0.03 10*3/mm3      Immature Grans, Absolute 0.02 10*3/mm3      nRBC 0.0 /100 WBC     Basic Metabolic Panel [[id number]]  (Abnormal) Collected: 02/25/25 0604    Specimen: Blood Updated: 02/25/25 0637     Glucose 99 mg/dL      BUN 15 mg/dL      Creatinine 0.80 mg/dL      Sodium 142 mmol/L      Potassium 3.8 mmol/L      Chloride 110 mmol/L      CO2 21.5 mmol/L      Calcium 8.8 mg/dL      BUN/Creatinine Ratio 18.8     Anion Gap 10.5 mmol/L      eGFR 103.2 mL/min/1.73     Narrative:      GFR Categories in Chronic Kidney Disease (CKD)      GFR Category          GFR (mL/min/1.73)    Interpretation  G1                     90 or greater         Normal or high (1)  G2                      60-89                Mild decrease (1)  G3a                   45-59                Mild to moderate decrease  G3b                   30-44                Moderate to severe decrease  G4                    15-29                Severe decrease  G5                    14 or less           Kidney failure          (1)In the absence of evidence of kidney disease, neither GFR category G1 or G2 fulfill the criteria for CKD.    eGFR calculation 2021 CKD-EPI creatinine equation, which does not include race as a factor    CBC & Differential [[id number]]  (Abnormal) Collected: 02/25/25 0604    Specimen: Blood Updated: 02/25/25 0614    Narrative:      The following orders were created for panel order CBC &  Differential.  Procedure                               Abnormality         Status                     ---------                               -----------         ------                     CBC Auto Differential[[id number]]        Abnormal            Final result                 Please view results for these tests on the individual orders.    CBC Auto Differential [[id number]]  (Abnormal) Collected: 02/25/25 0604    Specimen: Blood Updated: 02/25/25 0614     WBC 8.35 10*3/mm3      RBC 5.41 10*6/mm3      Hemoglobin 15.3 g/dL      Hematocrit 45.6 %      MCV 84.3 fL      MCH 28.3 pg      MCHC 33.6 g/dL      RDW 13.8 %      RDW-SD 42.1 fl      MPV 9.7 fL      Platelets 243 10*3/mm3      Neutrophil % 62.1 %      Lymphocyte % 22.4 %      Monocyte % 8.1 %      Eosinophil % 6.6 %      Basophil % 0.4 %      Immature Grans % 0.4 %      Neutrophils, Absolute 5.19 10*3/mm3      Lymphocytes, Absolute 1.87 10*3/mm3      Monocytes, Absolute 0.68 10*3/mm3      Eosinophils, Absolute 0.55 10*3/mm3      Basophils, Absolute 0.03 10*3/mm3      Immature Grans, Absolute 0.03 10*3/mm3      nRBC 0.0 /100 WBC     Catecholamine+VMA, 24-Hr Urine - Urine, Clean Catch [[id number]] Collected: 02/24/25 1658    Specimen: Urine, Clean Catch Updated: 02/24/25 1835    5 HIAA, Urine, Quantitative, 24 Hour - Urine, Clean Catch [[id number]] Collected: 02/24/25 1659    Specimen: 24 Hour Urine from Urine, Clean Catch Updated: 02/24/25 1709    Aldosterone / Renin Ratio [[id number]] Collected: 02/24/25 1029    Specimen: Blood Updated: 02/24/25 1036    Aldosterone [[id number]] Collected: 02/24/25 1006    Specimen: Blood Updated: 02/24/25 1010    Basic Metabolic Panel [[id number]]  (Abnormal) Collected: 02/24/25 0351    Specimen: Blood Updated: 02/24/25 0431     Glucose 111 mg/dL      BUN 14 mg/dL      Creatinine 0.82 mg/dL      Sodium 138 mmol/L      Potassium 3.7 mmol/L      Chloride 106 mmol/L      CO2 22.5 mmol/L      Calcium 9.3 mg/dL      BUN/Creatinine Ratio  17.1     Anion Gap 9.5 mmol/L      eGFR 102.5 mL/min/1.73     Narrative:      GFR Categories in Chronic Kidney Disease (CKD)      GFR Category          GFR (mL/min/1.73)    Interpretation  G1                     90 or greater         Normal or high (1)  G2                      60-89                Mild decrease (1)  G3a                   45-59                Mild to moderate decrease  G3b                   30-44                Moderate to severe decrease  G4                    15-29                Severe decrease  G5                    14 or less           Kidney failure          (1)In the absence of evidence of kidney disease, neither GFR category G1 or G2 fulfill the criteria for CKD.    eGFR calculation 2021 CKD-EPI creatinine equation, which does not include race as a factor    CBC & Differential [[id number]]  (Abnormal) Collected: 02/24/25 0351    Specimen: Blood Updated: 02/24/25 0409    Narrative:      The following orders were created for panel order CBC & Differential.  Procedure                               Abnormality         Status                     ---------                               -----------         ------                     CBC Auto Differential[[id number]]        Abnormal            Final result                 Please view results for these tests on the individual orders.    CBC Auto Differential [[id number]]  (Abnormal) Collected: 02/24/25 0351    Specimen: Blood Updated: 02/24/25 0409     WBC 7.65 10*3/mm3      RBC 5.54 10*6/mm3      Hemoglobin 15.5 g/dL      Hematocrit 46.6 %      MCV 84.1 fL      MCH 28.0 pg      MCHC 33.3 g/dL      RDW 14.0 %      RDW-SD 43.3 fl      MPV 9.8 fL      Platelets 273 10*3/mm3      Neutrophil % 57.4 %      Lymphocyte % 26.8 %      Monocyte % 9.4 %      Eosinophil % 5.9 %      Basophil % 0.4 %      Immature Grans % 0.1 %      Neutrophils, Absolute 4.39 10*3/mm3      Lymphocytes, Absolute 2.05 10*3/mm3      Monocytes, Absolute 0.72 10*3/mm3      Eosinophils,  Absolute 0.45 10*3/mm3      Basophils, Absolute 0.03 10*3/mm3      Immature Grans, Absolute 0.01 10*3/mm3      nRBC 0.0 /100 WBC     TSH [[id number]]  (Normal) Collected: 02/23/25 1540    Specimen: Blood Updated: 02/23/25 1645     TSH 0.775 uIU/mL     Hemoglobin A1c [[id number]]  (Normal) Collected: 02/23/25 1540    Specimen: Blood Updated: 02/23/25 1630     Hemoglobin A1C 5.50 %     Narrative:      Hemoglobin A1C Ranges:    Increased Risk for Diabetes  5.7% to 6.4%  Diabetes                     >= 6.5%  Diabetic Goal                < 7.0%    High Sensitivity Troponin T 1Hr [[id number]] Collected: 02/23/25 1051    Specimen: Blood Updated: 02/23/25 1123     HS Troponin T <6 ng/L      Troponin T Numeric Delta --     Comment: Unable to calculate.       Narrative:      High Sensitive Troponin T Reference Range:  <14.0 ng/L- Negative Female for AMI  <22.0 ng/L- Negative Male for AMI  >=14 - Abnormal Female indicating possible myocardial injury.  >=22 - Abnormal Male indicating possible myocardial injury.   Clinicians would have to utilize clinical acumen, EKG, Troponin, and serial changes to determine if it is an Acute Myocardial Infarction or myocardial injury due to an underlying chronic condition.         High Sensitivity Troponin T [[id number]]  (Normal) Collected: 02/23/25 0952    Specimen: Blood Updated: 02/23/25 1023     HS Troponin T <6 ng/L     Narrative:      High Sensitive Troponin T Reference Range:  <14.0 ng/L- Negative Female for AMI  <22.0 ng/L- Negative Male for AMI  >=14 - Abnormal Female indicating possible myocardial injury.  >=22 - Abnormal Male indicating possible myocardial injury.   Clinicians would have to utilize clinical acumen, EKG, Troponin, and serial changes to determine if it is an Acute Myocardial Infarction or myocardial injury due to an underlying chronic condition.               Imaging Results (All)       Procedure Component Value Units Date/Time    CT Head Without Contrast [[id number]]  Collected: 02/23/25 1203     Updated: 02/26/25 1202    Narrative:      CT OF THE BRAIN WITHOUT CONTRAST 02/23/2025     HISTORY: Hypertension. Vision changes.     Axial images were obtained through the brain without intravenous  contrast.     The brain parenchyma and ventricular system appear within normal limits.  No mass lesions, midline shift, intracranial hemorrhage or evidence of  infarction is demonstrated. There is some distal left vertebral artery  calcification. Bony structures appear unremarkable.       Impression:      No acute process.      Radiation dose reduction techniques were utilized, including automated  exposure control and exposure modulation based on body size.        This report was finalized on 2/26/2025 10:51 AM by Dr. Salvador Arita M.D on Workstation: [id number]       XR Hip With or Without Pelvis 2 - 3 View Left [[id number]] Collected: 02/24/25 1448     Updated: 02/24/25 1453    Narrative:      XR HIP W OR WO PELVIS 2-3 VIEW LEFT-     INDICATIONS: Trauma     TECHNIQUE: FRONTAL VIEWS OF THE PELVIS, 2 VIEWS OF THE LEFT HIP     COMPARISON: None available     FINDINGS:     No acute fracture, erosion, or dislocation is identified. Hip joint  spaces appear preserved. Arterial calcifications are noted.  Follow-up/further evaluation can be obtained as indications persist.       Impression:         As described.           This report was finalized on 2/24/2025 2:50 PM by Dr. Ayo Meraz M.D on Workstation: [id number]             ECG/EMG Results (all)       Procedure Component Value Units Date/Time    Telemetry Scan [[id number]] Resulted: 02/23/25     Updated: 02/23/25 1746    Telemetry Scan [[id number]] Resulted: 02/23/25     Updated: 02/24/25 0627    Telemetry Scan [[id number]] Resulted: 02/23/25     Updated: 02/24/25 1042    Telemetry Scan [[id number]] Resulted: 02/23/25     Updated: 02/24/25 1656    Telemetry Scan [[id number]] Resulted: 02/23/25     Updated: 02/25/25 0109    Telemetry Scan  "[[id number]] Resulted: 25     Updated: 25 0607    Telemetry Scan [[id number]] Resulted: 25     Updated: 25 1139    Telemetry Scan [[id number]] Resulted: 25     Updated: 25 1806    Telemetry Scan [[id number]] Resulted: 25     Updated: 25 0342    Telemetry Scan [[id number]] Resulted: 25     Updated: 25 0737    Telemetry Scan [[id number]] Resulted: 25     Updated: 25 1245    Telemetry Scan [[id number]] Resulted: 25     Updated: 25 1835    Telemetry Scan [[id number]] Resulted: 25     Updated: 25 0137    Telemetry Scan [[id number]] Resulted: 25     Updated: 25 0611        Physician Progress Notes (all)        Gin Barron APRN at 25 0636              Patient Name: Flako Coreas Sr.  :1965  59 y.o.      Patient Care Team:  Akash Rodriguez Jr., DO as PCP - General (Sports Medicine)  Karlene Leach MSW as  ([facility] Case Mgmt) (Population Health)    Chief Complaint: hypertension     Interval History: overall BP trends have shown great improvement. He is comfortable and ready to go home.        Objective   Vital Signs  Temp:  [98.2 °F (36.8 °C)-98.8 °F (37.1 °C)] 98.6 °F (37 °C)  Heart Rate:  [61-73] 61  Resp:  [18] 18  BP: (123-162)/() 123/90    Intake/Output Summary (Last 24 hours) at 2025  Last data filed at 2025 0500  Gross per 24 hour   Intake 0 ml   Output --   Net 0 ml     Flowsheet Rows      Flowsheet Row First Filed Value   Admission Height 188 cm (74\") Documented at 2025   Admission Weight 102 kg (224 lb 13.9 oz) Documented at 2025            Physical Exam:   General Appearance:    Alert, cooperative, in no acute distress   Lungs:     Clear to auscultation.  Normal respiratory effort and rate.      Heart:    Regular rhythm and normal rate, normal S1 and S2, no murmurs, gallops or rubs.     Chest Wall:    No abnormalities observed "   Abdomen:     Soft, nontender, positive bowel sounds.     Extremities:   no cyanosis, clubbing or edema.  No marked joint deformities.  Adequate musculoskeletal strength.       Results Review:    Results from last 7 days   Lab Units 02/27/25  0312   SODIUM mmol/L 145   POTASSIUM mmol/L 4.5   CHLORIDE mmol/L 115*   CO2 mmol/L 20.0*   BUN mg/dL 10   CREATININE mg/dL 0.81   GLUCOSE mg/dL 97   CALCIUM mg/dL 8.7     Results from last 7 days   Lab Units 02/23/25  1051 02/23/25  0952 02/23/25  0057   HSTROP T ng/L <6 <6 <6     Results from last 7 days   Lab Units 02/27/25  0312   WBC 10*3/mm3 7.20   HEMOGLOBIN g/dL 14.6   HEMATOCRIT % 44.3   PLATELETS 10*3/mm3 228                           Medication Review:   amLODIPine, 10 mg, Oral, Q24H  aspirin, 81 mg, Oral, Daily  atorvastatin, 40 mg, Oral, Nightly  busPIRone, 10 mg, Oral, Q8H  carvedilol, 12.5 mg, Oral, BID With Meals  fluticasone, 2 spray, Each Nare, Daily  folic acid, 1 mg, Oral, Daily  Lidocaine, 2 patch, Transdermal, Q24H  lisinopril, 40 mg, Oral, Daily  multivitamin with minerals, 1 tablet, Oral, Daily  pantoprazole, 40 mg, Oral, QAM AC  senna-docusate sodium, 2 tablet, Oral, BID  sodium chloride, 10 mL, Intravenous, Q12H  spironolactone, 50 mg, Oral, Daily  thiamine, 100 mg, Oral, Daily  vilazodone, 40 mg, Oral, Daily              Assessment & Plan      Hypertensive urgency  , secondary work up underway. Renal artery duplex ok.   Recurrent syncope, loop recorder in place.   History of left cerebellar CVA August 2023  Paroxysmal atrial fibrillation, low burden. AC stopped risk >> benefit due to falls and syncope  Untreated sleep apnea - CPAP has been recommended in the past. Changes and loss of insurance affected his compliance with follow up for this. I stress the importance of treating this and how is is linked to #1.   Hyperlipidemia   Anxiety - this is being addressed.     BP trends much better and stable for discharge. Will arrange 1 week follow up in  clinic for close monitoring.     JANEEN Lara  [facility]  25  06:36 EST      Electronically signed by Gin Barron APRN at 25 [phone]       Alden Rausch MD at 25 1037              [facility] Medicine Services  PROGRESS NOTE    Patient Name: Flako Coreas Sr.  : 1965  MRN: [id number]    Date of Admission: 2025  Primary Care Physician: Akash Rodriguez Jr., DO    Subjective   Subjective     CC:  Follow-up accelerated hypertension    Subjective: Patient still feeling little weak.  He is still having little difficulty walking around.  He feels he needs to get 1 more day to regain his strength and balance and thinks he will be ready to discharge home tomorrow.  He thinks if he goes home today he will be right back in the hospital.  He is calling his son today so that his son can help him tomorrow at home since he lives alone.  He denies any other new complaints.  He does seem a little anxious at baseline.    Review of Systems  No current fevers or chills  No current shortness of breath or cough  No current nausea, vomiting, or diarrhea  No current chest pain or palpitations       Objective   Objective     Vital Signs:   Temp:  [97.7 °F (36.5 °C)-99.4 °F (37.4 °C)] 98.2 °F (36.8 °C)  Heart Rate:  [54-89] 73  Resp:  [18] 18  BP: (132-180)/() 147/88        Physical Exam:  Constitutional: Awake, alert, no acute distress  HENT: NCAT, mucous membranes moist, neck supple  Respiratory: No cough or wheezes, normal respirations, nonlabored breathing   Cardiovascular: Pulse rate is normal, palpable radial pulses  Gastrointestinal:  Soft, nontender, nondistended  Musculoskeletal: BMI 28, somewhat chronically debilitated in appearance  Psychiatric: Somewhat anxious affect, cooperative, conversational  Neurologic: No slurred speech or facial droop, follows commands  Skin: No rashes or jaundice, warm      Results Reviewed:  Results from last 7  days   Lab Units 02/26/25  0350 02/25/25  0604 02/24/25  0351   WBC 10*3/mm3 8.17 8.35 7.65   HEMOGLOBIN g/dL 15.1 15.3 15.5   HEMATOCRIT % 46.2 45.6 46.6   PLATELETS 10*3/mm3 228 243 273     Results from last 7 days   Lab Units 02/26/25  0350 02/25/25  0604 02/24/25  0351 02/23/25  1051 02/23/25  0952 02/23/25  0057   SODIUM mmol/L 141 142 138  --   --  138   POTASSIUM mmol/L 3.2* 3.8 3.7  --   --  4.1   CHLORIDE mmol/L 111* 110* 106  --   --  107   CO2 mmol/L 20.5* 21.5* 22.5  --   --  21.0*   BUN mg/dL 14 15 14  --   --  15   CREATININE mg/dL 0.86 0.80 0.82  --   --  0.83   GLUCOSE mg/dL 107* 99 111*  --   --  115*   CALCIUM mg/dL 8.7 8.8 9.3  --   --  9.3   ALK PHOS U/L  --   --   --   --   --  75   ALT (SGPT) U/L  --   --   --   --   --  22   AST (SGOT) U/L  --   --   --   --   --  17   HSTROP T ng/L  --   --   --  <6 <6 <6     Estimated Creatinine Clearance: 120.8 mL/min (by C-G formula based on SCr of 0.86 mg/dL).    Microbiology Results Abnormal       None            Imaging Results (Last 24 Hours)       Procedure Component Value Units Date/Time    CT Head Without Contrast [[id number]] Collected: 02/23/25 1203     Updated: 02/26/25 1202    Narrative:      CT OF THE BRAIN WITHOUT CONTRAST 02/23/2025     HISTORY: Hypertension. Vision changes.     Axial images were obtained through the brain without intravenous  contrast.     The brain parenchyma and ventricular system appear within normal limits.  No mass lesions, midline shift, intracranial hemorrhage or evidence of  infarction is demonstrated. There is some distal left vertebral artery  calcification. Bony structures appear unremarkable.       Impression:      No acute process.      Radiation dose reduction techniques were utilized, including automated  exposure control and exposure modulation based on body size.        This report was finalized on 2/26/2025 10:51 AM by Dr. Salvador Arita M.D on Workstation: [id number]               Results for orders  placed during the hospital encounter of 01/25/25    Adult Transthoracic Echo Complete W/ Cont if Necessary Per Protocol    Interpretation Summary    Left ventricular ejection fraction appears to be 56 - 60%.    The left ventricular cavity is borderline dilated.    Left ventricular diastolic function was normal.    Estimated right ventricular systolic pressure from tricuspid regurgitation is normal (<35 mmHg).      I have reviewed the medications:  Scheduled Meds:amLODIPine, 10 mg, Oral, Q24H  aspirin, 81 mg, Oral, Daily  atorvastatin, 40 mg, Oral, Nightly  busPIRone, 10 mg, Oral, Q8H  carvedilol, 12.5 mg, Oral, BID With Meals  fluticasone, 2 spray, Each Nare, Daily  folic acid, 1 mg, Oral, Daily  Lidocaine, 2 patch, Transdermal, Q24H  lisinopril, 40 mg, Oral, Daily  multivitamin with minerals, 1 tablet, Oral, Daily  pantoprazole, 40 mg, Oral, QAM AC  senna-docusate sodium, 2 tablet, Oral, BID  sodium chloride, 10 mL, Intravenous, Q12H  [START ON 2/27/2025] spironolactone, 50 mg, Oral, Daily  thiamine, 100 mg, Oral, Daily  vilazodone, 40 mg, Oral, Daily      Continuous Infusions:   PRN Meds:.  acetaminophen **OR** acetaminophen **OR** acetaminophen    senna-docusate sodium **AND** polyethylene glycol **AND** bisacodyl **AND** bisacodyl    Calcium Replacement - Follow Nurse / BPA Driven Protocol    hydrOXYzine    Magnesium Standard Dose Replacement - Follow Nurse / BPA Driven Protocol    melatonin    nitroglycerin    ondansetron ODT **OR** ondansetron    Phosphorus Replacement - Follow Nurse / BPA Driven Protocol    Potassium Replacement - Follow Nurse / BPA Driven Protocol    sodium chloride    sodium chloride    Assessment & Plan   Assessment & Plan     Active Hospital Problems    Diagnosis  POA    **Hypertensive urgency [I16.0]  Yes    History of CVA (cerebrovascular accident) [Z86.73]  Not Applicable    Hyperglycemia [R73.9]  Yes    Peptic ulcer disease [K27.9]  Yes    History of diverticulosis [Z87.19]  Not  Applicable    History of atrial fibrillation [Z86.79]  Not Applicable    Mood disorder [F39]  Yes    Alcohol abuse [F10.10]  Yes    History of vertebral artery stenosis [Z86.79]  Not Applicable    Lepe's esophagus [K22.70]  Yes    GERD (gastroesophageal reflux disease) [K21.9]  Yes    Essential hypertension [I10]  Yes    Hyperlipidemia [E78.5]  Yes      Resolved Hospital Problems   No resolved problems to display.        Brief Hospital Course to date:  Flako Coreas Sr. is a 59 y.o. male with hypertensive urgency    Discussion/plan for today: All medical problems are under my management today.  Labs, vitals, and imaging reviewed.  Spironolactone has been added for blood pressure control.  Adjusting dose tomorrow per cardiology recommendation.  Overall blood pressure trends are improved.  Hopefully high diastolic pressure will improve with time.  Discussed with him recommend he avoid NSAIDs.  I recommend Tylenol for mild pain.  Psychiatry recommends to continue current treatments for anxiety and mood disorder which seems to be stabilizing.  Patient is calling his son to help him more at home.  Hopefully additional assistance will be useful.  I will work with case management to try and coordinate outpatient physical therapy.    Hypertensive urgency: Adjustment of medicines as per above.  I have recommended he use a pill organizer and to have his son help him organize his medications at discharge.    Recurrent syncope: No current issue.  Loop recorder in place.    History of stroke: Noted    Paroxysmal atrial fibrillation: Beta-blocker.  Off anticoagulation due to risks with syncope.  Patient understands the risks versus benefits of anticoagulation.    Untreated sleep apnea: Recommend CPAP.    Hyperlipidemia: Statin.  Stable.    Anxiety and mood disorder: Continue current medications, patient has been seen by psychiatry.    DVT Prophylaxis:   SCDs      Disposition: Home in a.m. with family assist    CODE STATUS:  "  Code Status and Medical Interventions: CPR (Attempt to Resuscitate); Full Support   Ordered at: 25 1510     Code Status (Patient has no pulse and is not breathing):    CPR (Attempt to Resuscitate)     Medical Interventions (Patient has pulse or is breathing):    Full Support       Alden Rausch MD  25      Electronically signed by Alden Rausch MD at 25 1345       Gin Barron APRN at 25 1236              Patient Name: Flako Coreas Sr.  :1965  59 y.o.      Patient Care Team:  Akash Rodriguez Jr., DO as PCP - General (Sports Medicine)  Karlene Leach MSW as  ([facility] Case Mgmt) (Population Health)    Chief Complaint: hypertension     Interval History: he is feeling better. Still anxious about everything. BP trends are improving. Particularly diastolic still high.        Objective   Vital Signs  Temp:  [97.7 °F (36.5 °C)-99.4 °F (37.4 °C)] 98.8 °F (37.1 °C)  Heart Rate:  [54-89] 72  Resp:  [18] 18  BP: (132-180)/() 139/97    Intake/Output Summary (Last 24 hours) at 2025 1236  Last data filed at 2025 0500  Gross per 24 hour   Intake 360 ml   Output 450 ml   Net -90 ml     Flowsheet Rows      Flowsheet Row First Filed Value   Admission Height 188 cm (74\") Documented at 2025 0629   Admission Weight 102 kg (224 lb 13.9 oz) Documented at 2025 0629            Physical Exam:   General Appearance:    Alert, cooperative, in no acute distress   Lungs:     Clear to auscultation.  Normal respiratory effort and rate.      Heart:    Regular rhythm and normal rate, normal S1 and S2, no murmurs, gallops or rubs.     Chest Wall:    No abnormalities observed   Abdomen:     Soft, nontender, positive bowel sounds.     Extremities:   no cyanosis, clubbing or edema.  No marked joint deformities.  Adequate musculoskeletal strength.       Results Review:    Results from last 7 days   Lab Units 25  0350   SODIUM mmol/L 141 "   POTASSIUM mmol/L 3.2*   CHLORIDE mmol/L 111*   CO2 mmol/L 20.5*   BUN mg/dL 14   CREATININE mg/dL 0.86   GLUCOSE mg/dL 107*   CALCIUM mg/dL 8.7     Results from last 7 days   Lab Units 02/23/25  1051 02/23/25  0952 02/23/25  0057   HSTROP T ng/L <6 <6 <6     Results from last 7 days   Lab Units 02/26/25  0350   WBC 10*3/mm3 8.17   HEMOGLOBIN g/dL 15.1   HEMATOCRIT % 46.2   PLATELETS 10*3/mm3 228                           Medication Review:   amLODIPine, 10 mg, Oral, Q24H  aspirin, 81 mg, Oral, Daily  atorvastatin, 40 mg, Oral, Nightly  busPIRone, 10 mg, Oral, Q8H  carvedilol, 12.5 mg, Oral, BID With Meals  fluticasone, 2 spray, Each Nare, Daily  folic acid, 1 mg, Oral, Daily  Lidocaine, 2 patch, Transdermal, Q24H  lisinopril, 40 mg, Oral, Daily  multivitamin with minerals, 1 tablet, Oral, Daily  pantoprazole, 40 mg, Oral, QAM AC  senna-docusate sodium, 2 tablet, Oral, BID  sodium chloride, 10 mL, Intravenous, Q12H  [START ON 2/27/2025] spironolactone, 50 mg, Oral, Daily  thiamine, 100 mg, Oral, Daily  vilazodone, 40 mg, Oral, Daily              Assessment & Plan      Hypertensive urgency  , secondary work up underway. Renal artery duplex ok.   Recurrent syncope, loop recorder in place.   History of left cerebellar CVA August 2023  Paroxysmal atrial fibrillation, low burden. AC stopped risk >> benefit due to falls and syncope  Untreated sleep apnea - CPAP has been recommended in the past. Changes and loss of insurance affected his compliance with follow up for this. I stress the importance of treating this and how is is linked to #1.   Hyperlipidemia   Anxiety - this is being addressed.     Starting to see some improvement.   Increase spironolactone.   Amlodipine was increased yesterday.    Given his severe anxiety , particularly in regards to his elevated blood pressure. I would like to see his trends a little better before he goes. Hopefully home soon.     Gin Barron APRTAMEKA  [facility]  25  12:36 EST      Electronically signed by Gin Barron APRN at 25 4984       Bari Purcell MD at 25 [phone]                [facility]    Patient Name: Flako Coreas Sr.  :1965  59 y.o.  LOS: 0  Encounter Provider: Bari Purcell MD      Patient Care Team:  Akash Rodriguez Jr.,  as PCP - General (Sports Medicine)  Karlene Leach MSW as  ([facility] Case Mgmt) ([employer])    Chief Complaint/Consult question: Hypertension    PMH/HPI:  59 year old gentleman followed by Dr. Taylor. He has a history of cerebellar stroke with residual ataxia, frequent falls, hypertension, paroxysmal atrial fibrillation and questionable alcohol over use (patient denies). He has not been on anticoagulation due to his falls and frequent syncope. He has a loop recorder in and has a low AF burden.      He was initially seen by cardiology in 2023 when he was hospitalized with hypertensive emergency and acute left cerebellar strokes. Since this he has had multiple episodes of dizziness, near syncope, and syncope. He had a loop recorder placed in 2024. In 2024 he had a syncopal episode while using the bathroom. His loop recorder showed a 13 second pause. This was felt to likely have been vasovagal.      He presented to the ED on 25 with complaints of a syncopal episode which occurred when he got out of bed. He had labile blood pressures during that admission. He was discharged on norvasc 5 mg , carvedilol 3.125 mg BID, and lisinopril 40 mg daily.      He called office on 2/3 worried about his blood pressure. He hadn't been taking the carvedilol. He restarted that but continued to have elevated blood pressures. He missed office follow up appts. He has untreated sleep apnea. Changes and loss of insurance has effected his compliance.      He came to the emergency room yesterday with continued elevated blood pressure readings. Systolics in the 200s.  "He was intermittently taking hydralazine pills and it wasn't helping. He was given IV labetolol and BP improved to 170/100 so he was released home.      Unfortunately he came back several hours later - /135 . He was admitted to the hospitalists. Temporarily  on nicardipine. That has since been stopped. Carvedilol has been increased. Lisinopril and amlodipine continued. Secondary hypertensive work up has been initiated with - aldosterone level/VMA, serotonin ,catecholamines in the urine. Renal artery duplex ok.     Interval History: No acute events though BP remains persistently elevated, and patient is extremely anxious.       Objective   Vital Signs  Temp:  [97.7 °F (36.5 °C)-99.4 °F (37.4 °C)] 99.4 °F (37.4 °C)  Heart Rate:  [50-74] 69  Resp:  [18] 18  BP: (121-153)/() 144/96    Intake/Output Summary (Last 24 hours) at 2/25/2025 1548  Last data filed at 2/25/2025 1500  Gross per 24 hour   Intake 582 ml   Output 700 ml   Net -118 ml     Flowsheet Rows      Flowsheet Row First Filed Value   Admission Height 188 cm (74\") Documented at 02/23/2025 0629   Admission Weight 102 kg (224 lb 13.9 oz) Documented at 02/23/2025 0629              Vitals and nursing note reviewed.   Constitutional:       Appearance: Healthy appearance. Not in distress.   Neck:      Vascular: No JVR.   Pulmonary:      Effort: Pulmonary effort is normal.      Breath sounds: Normal breath sounds. No wheezing. No rhonchi. No rales.   Cardiovascular:      Normal rate. Regular rhythm.      Murmurs: There is no murmur.   Edema:     Peripheral edema absent.   Abdominal:      General: There is no distension.      Palpations: Abdomen is soft.      Tenderness: There is no abdominal tenderness.   Musculoskeletal:      Cervical back: Neck supple. Skin:     General: Skin is cool.   Neurological:      Mental Status: Alert and oriented to person, place and time.   Psychiatric:         Mood and Affect: Mood is anxious.           Pertinent Test " "Results:  Results from last 7 days   Lab Units 02/25/25  0604 02/24/25  0351 02/23/25  0057   SODIUM mmol/L 142 138 138   POTASSIUM mmol/L 3.8 3.7 4.1   CHLORIDE mmol/L 110* 106 107   CO2 mmol/L 21.5* 22.5 21.0*   BUN mg/dL 15 14 15   CREATININE mg/dL 0.80 0.82 0.83   GLUCOSE mg/dL 99 111* 115*   CALCIUM mg/dL 8.8 9.3 9.3   AST (SGOT) U/L  --   --  17   ALT (SGPT) U/L  --   --  22     Results from last 7 days   Lab Units 02/23/25  1051 02/23/25  0952 02/23/25  0057   HSTROP T ng/L <6 <6 <6     Results from last 7 days   Lab Units 02/25/25  0604 02/24/25  0351 02/23/25  0057   WBC 10*3/mm3 8.35 7.65 7.95   HEMOGLOBIN g/dL 15.3 15.5 15.7   HEMATOCRIT % 45.6 46.6 47.2   PLATELETS 10*3/mm3 243 273 236                   Invalid input(s): \"LDLCALC\"      Results from last 7 days   Lab Units 02/23/25  1540   TSH uIU/mL 0.775           Medication Review:   amLODIPine, 5 mg, Oral, Q24H  aspirin, 81 mg, Oral, Daily  atorvastatin, 40 mg, Oral, Nightly  busPIRone, 10 mg, Oral, Q8H  carvedilol, 12.5 mg, Oral, BID With Meals  fluticasone, 2 spray, Each Nare, Daily  folic acid, 1 mg, Oral, Daily  Lidocaine, 2 patch, Transdermal, Q24H  lisinopril, 40 mg, Oral, Daily  multivitamin with minerals, 1 tablet, Oral, Daily  pantoprazole, 40 mg, Oral, QAM AC  senna-docusate sodium, 2 tablet, Oral, BID  sodium chloride, 10 mL, Intravenous, Q12H  spironolactone, 25 mg, Oral, Daily  thiamine, 100 mg, Oral, Daily  vilazodone, 40 mg, Oral, Daily              Assessment & Plan     Active Hospital Problems    Diagnosis  POA    **Hypertensive urgency [I16.0]  Yes    History of CVA (cerebrovascular accident) [Z86.73]  Not Applicable    Hyperglycemia [R73.9]  Yes    Peptic ulcer disease [K27.9]  Yes    History of diverticulosis [Z87.19]  Not Applicable    History of atrial fibrillation [Z86.79]  Not Applicable    Mood disorder [F39]  Yes    Alcohol abuse [F10.10]  Yes    History of vertebral artery stenosis [Z86.79]  Not Applicable    Lepe's " esophagus [K22.70]  Yes    GERD (gastroesophageal reflux disease) [K21.9]  Yes    Essential hypertension [I10]  Yes    Hyperlipidemia [E78.5]  Yes      Resolved Hospital Problems   No resolved problems to display.        Hypertension/hypertensive urgency: BP persistently elevated despite multiple antihypertensive agents.  He is undergoing evaluation for secondary causes of hypertension, specifically awaiting urine catecholamine and 5 HIAA, aldosterone/renin ratio.  TSH and renal duplex were both within normal limits.  He is currently on lisinopril 40 daily, carvedilol 12.5 bid, spironolactone 25 daily, and his amlodipine is getting uptitrated to 10 daily.  Patient is extremely anxious and I suspect this is at least 1 main factor driving up his BP despite very aggressive medical therapy.  Recurrent syncope, loop recorder in place.   History of left cerebellar CVA 2023: BP control, see above.  Paroxysmal atrial fibrillation, low burden. AC previously stopped risk >> benefit due to falls and syncope, continue home aspirin 81 daily.  Untreated sleep apnea - CPAP has been recommended in the past. Changes and loss of insurance affected his compliance with follow up for this.   Hyperlipidemia/coronary atherosclerosis: Recent CTA chest showed evidence of coronary calcification.  Continue atorvastatin 40 qhs.  Anxiety: This appears completely uncontrolled, appreciate psychiatry input and management.     Bari Purcell MD  [facility]  25  15:48 EST      Electronically signed by Bari Purcell MD at 25 3023       Ester Bright MD at 25 6091              Name: Flako Coreas . ADMIT: 2025   : 1965  PCP: Akash Rodriguez Jr., DO    MRN: [id number] LOS: 0 days   AGE/SEX: 59 y.o. male  ROOM: [facility]     Subjective   Subjective   Still feeling very anxious.  Patient reports no headache.  No dizziness.  No visual blurring.  No unilateral numbness or weakness.  No  dizziness.  No seizures.  He also reports no chest pain.  No palpitation.  No shortness of breath.  No cough.  No wheeze.  No ankle edema.      Review of Systems  GI.  No abdominal pain.  No nausea or vomiting  .  No dysuria or hematuria.    Objective   Objective   Vital Signs  Temp:  [97.7 °F (36.5 °C)-98.8 °F (37.1 °C)] 98.8 °F (37.1 °C)  Heart Rate:  [50-72] 70  Resp:  [16-18] 18  BP: (121-156)/() 134/98  SpO2:  [91 %-97 %] 92 %  on   ;   Device (Oxygen Therapy): room air    Intake/Output Summary (Last 24 hours) at 2/25/2025 1106  Last data filed at 2/25/2025 0725  Gross per 24 hour   Intake 222 ml   Output 500 ml   Net -278 ml     Body mass index is 28.87 kg/m².      02/23/25  0629   Weight: 102 kg (224 lb 13.9 oz)     Physical Exam  General.  Middle-aged gentleman.  He is alert and oriented x 4.  In no apparent pain/distress/diaphoresis.  Still appear very anxious.  Affect normal.  Eyes.  Pupils equal round and reactive intact extraocular musculature.  No pallor or jaundice  Oral cavity.  Moist mucous membranes  Neck.  Supple.  No JVD.  No lymphadenopathy or thyromegaly  Cardio vascular.  Regular rate and rhythm with no gallops or murmurs  Chest.  Clear to auscultation bilaterally with no added sounds  Abdomen.  Soft lax.  No tenderness.  No organomegaly.  No guarding or rebound.  Extremities.  No clubbing/cyanosis/edema  CNS.  No acute focal neurological deficits.     Results Review:      Results from last 7 days   Lab Units 02/25/25  0604 02/24/25  0351 02/23/25  0057   SODIUM mmol/L 142 138 138   POTASSIUM mmol/L 3.8 3.7 4.1   CHLORIDE mmol/L 110* 106 107   CO2 mmol/L 21.5* 22.5 21.0*   BUN mg/dL 15 14 15   CREATININE mg/dL 0.80 0.82 0.83   GLUCOSE mg/dL 99 111* 115*   CALCIUM mg/dL 8.8 9.3 9.3   AST (SGOT) U/L  --   --  17   ALT (SGPT) U/L  --   --  22     Estimated Creatinine Clearance: 129.8 mL/min (by C-G formula based on SCr of 0.8 mg/dL).  Results from last 7 days   Lab Units 02/23/25  2722  "  HEMOGLOBIN A1C % 5.50         Results from last 7 days   Lab Units 02/23/25  1051 02/23/25  0952 02/23/25  0057   HSTROP T ng/L <6 <6 <6         Results from last 7 days   Lab Units 02/23/25  1540   TSH uIU/mL 0.775               Invalid input(s): \"LDLCALC\"  Results from last 7 days   Lab Units 02/25/25  0604 02/24/25  0351 02/23/25  0057   WBC 10*3/mm3 8.35 7.65 7.95   HEMOGLOBIN g/dL 15.3 15.5 15.7   HEMATOCRIT % 45.6 46.6 47.2   PLATELETS 10*3/mm3 243 273 236   MCV fL 84.3 84.1 85.4   MCH pg 28.3 28.0 28.4   MCHC g/dL 33.6 33.3 33.3   RDW % 13.8 14.0 14.2   RDW-SD fl 42.1 43.3 44.1   MPV fL 9.7 9.8 9.7   NEUTROPHIL % % 62.1 57.4 60.6   LYMPHOCYTE % % 22.4 26.8 25.5   MONOCYTES % % 8.1 9.4 7.2   EOSINOPHIL % % 6.6* 5.9 6.2   BASOPHIL % % 0.4 0.4 0.4   IMM GRAN % % 0.4 0.1 0.1   NEUTROS ABS 10*3/mm3 5.19 4.39 4.82   LYMPHS ABS 10*3/mm3 1.87 2.05 2.03   MONOS ABS 10*3/mm3 0.68 0.72 0.57   EOS ABS 10*3/mm3 0.55* 0.45* 0.49*   BASOS ABS 10*3/mm3 0.03 0.03 0.03   IMMATURE GRANS (ABS) 10*3/mm3 0.03 0.01 0.01   NRBC /100 WBC 0.0 0.0 0.0                                           Imaging:  Imaging Results (Last 24 Hours)       Procedure Component Value Units Date/Time    XR Hip With or Without Pelvis 2 - 3 View Left [[id number]] Collected: 02/24/25 1448     Updated: 02/24/25 1453    Narrative:      XR HIP W OR WO PELVIS 2-3 VIEW LEFT-     INDICATIONS: Trauma     TECHNIQUE: FRONTAL VIEWS OF THE PELVIS, 2 VIEWS OF THE LEFT HIP     COMPARISON: None available     FINDINGS:     No acute fracture, erosion, or dislocation is identified. Hip joint  spaces appear preserved. Arterial calcifications are noted.  Follow-up/further evaluation can be obtained as indications persist.       Impression:         As described.           This report was finalized on 2/24/2025 2:50 PM by Dr. Ayo Meraz M.D on Workstation: [id number]                  I reviewed the patient's new clinical results / labs / tests / " procedures      Assessment/Plan     Active Hospital Problems    Diagnosis  POA    **Hypertensive urgency [I16.0]  Yes    History of CVA (cerebrovascular accident) [Z86.73]  Not Applicable    Hyperglycemia [R73.9]  Yes    Peptic ulcer disease [K27.9]  Yes    History of diverticulosis [Z87.19]  Not Applicable    History of atrial fibrillation [Z86.79]  Not Applicable    Mood disorder [F39]  Yes    Alcohol abuse [F10.10]  Yes    History of vertebral artery stenosis [Z86.79]  Not Applicable    Lepe's esophagus [K22.70]  Yes    GERD (gastroesophageal reflux disease) [K21.9]  Yes    Essential hypertension [I10]  Yes    Hyperlipidemia [E78.5]  Yes      Resolved Hospital Problems   No resolved problems to display.         Hypertensive urgency in a patient with a history of atrial fibrillation and hypertension.  Cardiovascular examination is unremarkable.  CNS examination is unremarkable.  No evidence of angina or CVA.  Patient troponins x 3 is negative.  CT scan of the brain without acute disease.  Chest x-ray with no pulmonary edema or significant cardiomegaly.  EKG with normal sinus rhythm.  Patient last echo on 1/2025 revealing a normal ejection fraction with mild MR. Improved blood pressure.  Currently off Cardene drip.  Will continue current dose of Coreg (avoid increasing secondary to borderline bradycardia).  When on stroke continue dose of lisinopril and Norvasc and Aldactone added by cardiology.  Monitor basic metabolic profile after addition of Aldactone to lisinopril.  There is no clinical evidence of endorgan damage.  TSH is normal.  Renal duplex is negative.  Awaiting aldosterone level/VMA, serotonin ,catecholamines in the urine.. I Believes anxiety has a lot to do with his hypertension being uncontrolled.  Noted and appreciate cardiology consult.    History of cerebellar CVA leading to ataxia/hypercholesterolemia/left vertebral artery stenosis.  CNS examination without acute deficit.  CT scan of the brain  without acute abnormalities.  Continue aspirin and Lipitor.  Continue blood pressure control.  GERD/Lepe's/peptic ulcer disease/diverticular disease.  Benign GI examination.  Continue Protonix.  Mood disorder/anxiety and memory deficit.  Poorly controlled.  Continue Viibryd/BuSpar.  Patient wants to avoid any benzodiazepines.  Awaiting psychiatry consult.  Hyperglycemia.  A1c normal at two 5.5.  History of alcohol use..  Patient states that he stopped alcohol completely 2 to 3 weeks ago.  He also states that he used to drink an average of 1 drink per day.  He absolutely declines benzodiazepine and any CIWA protocol.  I do not believe that he is in any withdrawal at this time  VTE prophylaxis.  Sequential compression devices        Discussed my findings and plan of treatment with the patient/nurse.  Disposition.  Anticipate discharge home tomorrow if blood pressure remains stable and if okay with cardiology and psychiatry.        Ester Bright MD  [facility]  25  11:06 EST           Electronically signed by Ester Bright MD at 25 1110       Ester Bright MD at 25 1205              Name: Flako Coreas . ADMIT: 2025   : 1965  PCP: Akash Rodriguez Jr.,     MRN: [id number] LOS: 0 days   AGE/SEX: 59 y.o. male  ROOM: [facility]/     Subjective   Subjective   Patient reports no headache.  No dizziness.  No visual blurring.  No unilateral numbness or weakness.  No dizziness.  No seizures.  He also reports no chest pain.  No palpitation.  No shortness of breath.  No cough.  No wheeze.  No ankle edema.  He reports that his anxiety is better.    Review of Systems  GI.  No abdominal pain.  No nausea or vomiting  .  No dysuria or hematuria.    Objective   Objective   Vital Signs  Temp:  [97.3 °F (36.3 °C)-99.1 °F (37.3 °C)] 99 °F (37.2 °C)  Heart Rate:  [55-97] 72  Resp:  [16-18] 16  BP: (113-179)/() 144/109  SpO2:  [89 %-96 %] 95 %  on   ;  "  Device (Oxygen Therapy): room air    Intake/Output Summary (Last 24 hours) at 2/24/2025 1205  Last data filed at 2/24/2025 0931  Gross per 24 hour   Intake 320 ml   Output 1050 ml   Net -730 ml     Body mass index is 28.87 kg/m².      02/23/25 0629   Weight: 102 kg (224 lb 13.9 oz)     Physical Exam  General.  Middle-aged gentleman.  He is alert and oriented x 4.  In no apparent pain/distress/diaphoresis.  Still appear very anxious.  Affect normal.  Eyes.  Pupils equal round and reactive intact extraocular musculature.  No pallor or jaundice  Oral cavity.  Moist mucous membranes  Neck.  Supple.  No JVD.  No lymphadenopathy or thyromegaly  Cardio vascular.  Regular rate and rhythm with no gallops or murmurs  Chest.  Clear to auscultation bilaterally with no added sounds  Abdomen.  Soft lax.  No tenderness.  No organomegaly.  No guarding or rebound.  Extremities.  No clubbing/cyanosis/edema  CNS.  No acute focal neurological deficits.     Results Review:      Results from last 7 days   Lab Units 02/24/25  0351 02/23/25  0057   SODIUM mmol/L 138 138   POTASSIUM mmol/L 3.7 4.1   CHLORIDE mmol/L 106 107   CO2 mmol/L 22.5 21.0*   BUN mg/dL 14 15   CREATININE mg/dL 0.82 0.83   GLUCOSE mg/dL 111* 115*   CALCIUM mg/dL 9.3 9.3   AST (SGOT) U/L  --  17   ALT (SGPT) U/L  --  22     Estimated Creatinine Clearance: 126.7 mL/min (by C-G formula based on SCr of 0.82 mg/dL).  Results from last 7 days   Lab Units 02/23/25  1540   HEMOGLOBIN A1C % 5.50         Results from last 7 days   Lab Units 02/23/25  1051 02/23/25  0952 02/23/25  0057   HSTROP T ng/L <6 <6 <6         Results from last 7 days   Lab Units 02/23/25  1540   TSH uIU/mL 0.775               Invalid input(s): \"LDLCALC\"  Results from last 7 days   Lab Units 02/24/25  0351 02/23/25  0057   WBC 10*3/mm3 7.65 7.95   HEMOGLOBIN g/dL 15.5 15.7   HEMATOCRIT % 46.6 47.2   PLATELETS 10*3/mm3 273 236   MCV fL 84.1 85.4   MCH pg 28.0 28.4   MCHC g/dL 33.3 33.3   RDW % 14.0 14.2 "   RDW-SD fl 43.3 44.1   MPV fL 9.8 9.7   NEUTROPHIL % % 57.4 60.6   LYMPHOCYTE % % 26.8 25.5   MONOCYTES % % 9.4 7.2   EOSINOPHIL % % 5.9 6.2   BASOPHIL % % 0.4 0.4   IMM GRAN % % 0.1 0.1   NEUTROS ABS 10*3/mm3 4.39 4.82   LYMPHS ABS 10*3/mm3 2.05 2.03   MONOS ABS 10*3/mm3 0.72 0.57   EOS ABS 10*3/mm3 0.45* 0.49*   BASOS ABS 10*3/mm3 0.03 0.03   IMMATURE GRANS (ABS) 10*3/mm3 0.01 0.01   NRBC /100 WBC 0.0 0.0                                           Imaging:  Imaging Results (Last 24 Hours)       ** No results found for the last 24 hours. **               I reviewed the patient's new clinical results / labs / tests / procedures      Assessment/Plan     Active Hospital Problems    Diagnosis  POA    **Hypertensive urgency [I16.0]  Yes    History of CVA (cerebrovascular accident) [Z86.73]  Not Applicable    Hyperglycemia [R73.9]  Yes    Peptic ulcer disease [K27.9]  Yes    History of diverticulosis [Z87.19]  Not Applicable    History of atrial fibrillation [Z86.79]  Not Applicable    Mood disorder [F39]  Yes    Alcohol abuse [F10.10]  Yes    History of vertebral artery stenosis [Z86.79]  Not Applicable    Lepe's esophagus [K22.70]  Yes    GERD (gastroesophageal reflux disease) [K21.9]  Yes    Essential hypertension [I10]  Yes    Hyperlipidemia [E78.5]  Yes      Resolved Hospital Problems   No resolved problems to display.         Hypertensive urgency in a patient with a history of atrial fibrillation and hypertension.  Cardiovascular examination is unremarkable.  CNS examination is unremarkable.  No evidence of angina or CVA.  Patient troponins x 3 is negative.  CT scan of the brain without acute disease.  Chest x-ray with no pulmonary edema or significant cardiomegaly.  EKG with normal sinus rhythm.  Patient last echo on 1/2025 revealing a normal ejection fraction with mild MR. Improved pressure things.  Currently off Cardene drip.  Will continue current dose of Coreg (avoid increasing secondary to borderline  bradycardia).  Will initiate Norvasc.  Will continue maximum dose of lisinopril.  There is no clinical evidence of endorgan damage.  TSH is normal.  Renal duplex is negative.  Awaiting aldosterone level/VMA, serotonin ,catecholamines in the urine..  Believes anxiety has a lot to do with his hypertension being uncontrolled.  Will ask psychiatry to see.  Awaiting cardiology consult.    History of cerebellar CVA leading to ataxia/hypercholesterolemia/left vertebral artery stenosis.  CNS examination without acute deficit.  CT scan of the brain without acute abnormalities.  Continue aspirin and Lipitor.  Blood pressure control.  GERD/Lepe's/peptic ulcer disease/diverticular disease.  Benign GI examination.  Continue Protonix.  Mood disorder/anxiety and memory deficit.  Continue Viibryd/BuSpar.  Patient wants to avoid any benzodiazepines will ask psychiatry to see.  Hyperglycemia.  A1c normal at two 5.5.  History of alcohol use..  Patient states that he stopped alcohol completely 2 to 3 weeks ago.  He also states that he used to drink an average of 1 drink per day.  He absolutely declines benzodiazepine and any CIWA protocol.  I do not believe that he is in any withdrawal at this time  VTE prophylaxis.  Sequential compression devices        Discussed my findings and plan of treatment with the patient/nurse.  Disposition.  To be determined based on clinical course.        Ester Bright MD  [facility]  02/24/25  12:05 EST           Electronically signed by Ester Bright MD at 02/24/25 1211          Consult Notes (all)        Farshad Ca, MARIVEL at 02/25/25 1835        Consult Orders    1. [facility] Consult [[id number]] ordered by Ester Bright MD at 02/25/25 1630                 [facility] evaluated 59-year-old male for anxiety.  Patient was last evaluated by [facility] on 1/29/2025.  Patient has had several strokes in the past and feels like he has a hard time  "\"connecting the dots\" since then.  Patient had a string of losses that have created a lot of depression and anxiety for patient.  Patient is currently on Viibryd and was seen by the psychiatrist.  Patient is also taking Atarax since being in the hospital which he states helped his anxiety but he felt a little sedated from it.  [facility] told patient he can stick with this dose and see if his body gets adjusted or if he would like to have them lower it to let his nurse know.  Patient denies SI and denies history of attempts.  Patient rates his anxiety level after having the Atarax as a 3 or 4/10 and before the Atarax as a 9/10.  Patient rates his depression as a 10/10.  Patient has not been able to follow up with the outpatient counseling resources given to him as well as psychiatrist resources due to not having insurance.  PCP is working with the patient regarding his insurance.  Patient forgot what kind of insurance it was.  Patient states he has had no alcohol since 1/25/2025 and states he does not feel that his symptoms in January were from alcohol withdrawal.  Patient denies drug use and tobacco use.  Patient states his sleep is poor but he is hopeful he is starting to use a CPAP in the near future.  Patient states his appetite is been poor in the hospital and somewhat limited at home.  Patient lives by himself in an apartment and continues to get support from his son and several friends.  [facility] will follow and make sure he has copies of his previous psychiatrist and counseling outpatient resources.    Electronically signed by Farshad Ca LCSW at 02/25/25 9717       Teja Garrido III, MD at 02/25/25 1300        Consult Orders    1. Inpatient Psychiatrist Consult [[id number]] ordered by Ester Bright MD at 02/24/25 1202                 IDENTIFYING INFORMATION: The patient is a 59-year-old white male admitted with ongoing hypertension.  He is seen by psychiatry related to history of " alcohol use and depression    CHIEF COMPLAINT: None given    INFORMANT: Patient and chart    RELIABILITY: Good    HISTORY OF PRESENT ILLNESS: The patient is a 59-year-old white male admitted with ongoing hypertension.  He was seen by this physician in January of this year when he had required treatment for what was thought to be an alcohol-induced delirium.  He is currently prescribed Viibryd 40 mg daily, though he believes that he is taking Abilify.  The chart does not reflect this, however.  The patient reports that his mood has been fairly stable and he minimizes his recent use of alcohol.  For more complete history of present illness please refer to previous dictated notes    PAST PSYCHIATRIC HISTORY: Reviewed no changes    PAST MEDICAL HISTORY: Reviewed no changes    MEDICATIONS:   Current Facility-Administered Medications   Medication Dose Route Frequency Provider Last Rate Last Admin    acetaminophen (TYLENOL) tablet 650 mg  650 mg Oral Q4H PRN Ester Bright MD   650 mg at 02/25/25 1229    Or    acetaminophen (TYLENOL) 160 MG/5ML oral solution 650 mg  650 mg Oral Q4H PRN Ester Bright MD        Or    acetaminophen (TYLENOL) suppository 650 mg  650 mg Rectal Q4H PRN Ester Bright MD        amLODIPine (NORVASC) tablet 5 mg  5 mg Oral Q24H Ester Bright MD   5 mg at 02/25/25 0815    aspirin EC tablet 81 mg  81 mg Oral Daily Ester Bright MD   81 mg at 02/25/25 0815    atorvastatin (LIPITOR) tablet 40 mg  40 mg Oral Nightly Ester Bright MD   40 mg at 02/24/25 2043    sennosides-docusate (PERICOLACE) 8.6-50 MG per tablet 2 tablet  2 tablet Oral BID Ester Bright MD   2 tablet at 02/25/25 0816    And    polyethylene glycol (MIRALAX) packet 17 g  17 g Oral Daily PRN Ester Bright MD        And    bisacodyl (DULCOLAX) EC tablet 5 mg  5 mg Oral Daily PRN Ester Bright MD        And    bisacodyl (DULCOLAX) suppository 10 mg  10 mg Rectal Daily PRN Ester Bright MD         busPIRone (BUSPAR) tablet 10 mg  10 mg Oral Q8H Ester Bright MD   10 mg at 02/25/25 0628    Calcium Replacement - Follow Nurse / BPA Driven Protocol   Not Applicable Ester Martinez MD        carvedilol (COREG) tablet 12.5 mg  12.5 mg Oral BID With Meals Ester Bright MD   12.5 mg at 02/25/25 0815    fluticasone (FLONASE) 50 MCG/ACT nasal spray 2 spray  2 spray Each Nare Daily Marcy Hillman APRN   2 spray at 02/25/25 0822    folic acid (FOLVITE) tablet 1 mg  1 mg Oral Daily Ester Bright MD   1 mg at 02/25/25 0816    Lidocaine 4 % 2 patch  2 patch Transdermal Q24H Steffen Preciado APRN   2 patch at 02/25/25 0814    lisinopril (PRINIVIL,ZESTRIL) tablet 40 mg  40 mg Oral Daily Ester Bright MD   40 mg at 02/25/25 0819    Magnesium Standard Dose Replacement - Follow Nurse / BPA Driven Protocol   Not Applicable Ester Martinez MD        melatonin tablet 5 mg  5 mg Oral Nightly PREster Wick MD   5 mg at 02/23/25 2200    multivitamin with minerals 1 tablet  1 tablet Oral Daily Ester Bright MD   1 tablet at 02/25/25 0815    nitroglycerin (NITROSTAT) SL tablet 0.4 mg  0.4 mg Sublingual Q5 Min PREster Wick MD        ondansetron ODT (ZOFRAN-ODT) disintegrating tablet 4 mg  4 mg Oral Q6H Ester Martinez MD        Or    ondansetron (ZOFRAN) injection 4 mg  4 mg Intravenous Q6H PREster Wick MD        pantoprazole (PROTONIX) EC tablet 40 mg  40 mg Oral QAM AC Ester Bright MD   40 mg at 02/25/25 0630    Phosphorus Replacement - Follow Nurse / BPA Driven Protocol   Not Applicable Ester Martinez MD        Potassium Replacement - Follow Nurse / BPA Driven Protocol   Not Applicable Ester Martinez MD        sodium chloride 0.9 % flush 10 mL  10 mL Intravenous Q12H Ester Bright MD   10 mL at 02/25/25 0822    sodium chloride 0.9 % flush 10 mL  10 mL Intravenous PREster Wick MD        sodium chloride 0.9 % infusion 40 mL  40 mL  Intravenous PRN Ester Bright MD        spironolactone (ALDACTONE) tablet 25 mg  25 mg Oral Daily Gin Barron APRN   25 mg at 02/25/25 0815    thiamine (VITAMIN B-1) tablet 100 mg  100 mg Oral Daily Ester Bright MD   100 mg at 02/25/25 0816    vilazodone (VIIBRYD) tablet 40 mg  40 mg Oral Daily Ester Bright MD   40 mg at 02/25/25 0815         ALLERGIES: None    FAMILY HISTORY: Reviewed no changes    SOCIAL HISTORY: Reviewed no changes    MENTAL STATUS EXAM: Patient is a well-developed well-nourished white male appearing his stated age.  He has no apparent physical distress at the time of the examination.  He is awake alert and oriented all spheres.  His mood is euthymic his affect congruent.  Speech is generally relevant and coherent.  There are no gross deficits in memory or cognition noted.  Intelligence is judged to be in the average range based on fund of knowledge the patient is cooperative throughout the interview.  He denies current suicidal homicidal or psychotic features.  Judgement and insight are reasonably intact.    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  His mood seems stable and I would recommend he continue on this medication with follow-up with previous psychiatric providers.  Little else to add, I will sign off.    Electronically signed by Teja Garrido III, MD at 02/25/25 1305       Gin Barron APRN at 02/24/25 2017        Consult Orders    1. Inpatient Cardiology Consult [[id number]] ordered by Ester Bright MD at 02/23/25 1510              Attestation signed by Carlos Foster MD at 02/25/25 0708    I have reviewed this documentation and agree.                      Patient Name: Flako Coreas  Sr.  :1965  59 y.o.    Date of Admission: 2025  Date of Consultation:  25  Encounter Provider: JANEEN Lara  Place of Service: [facility] CARDIOLOGY  Referring Provider: No ref. provider found  Patient Care Team:  Akash Rodriguez Jr.,  as PCP - General (Sports Medicine)  Karlene Leach MSW as  ([facility]) ([facility])      Chief complaint:    History of Present Illness:  Mr. Coreas is a 59 year old gentleman followed by Dr. Taylor. He has a history of cerebellar stroke with residual ataxia, frequent falls, hypertension, paroxysmal atrial fibrillation and questionable alcohol over use (patient denies). He has not been on anticoagulation due to his falls and frequent syncope. He has a loop recorder in and has a low AF burden.     He was initially seen by cardiology in 2023 when he was hospitalized with hypertensive emergency and acute left cerebellar strokes. Since this he has had multiple episodes of dizziness, near syncope, and syncope. He had a loop recorder placed in 2024. In 2024 he had a syncopal episode while using the bathroom. His loop recorder showed a 13 second pause. This was felt to likely have been vasovagal.      He presented to the ED on 25 with complaints of a syncopal episode which occurred when he got out of bed. He had labile blood pressures during that admission. He was discharged on norvasc 5 mg , carvedilol 3.125 mg BID, and lisinopril 40 mg daily.     He called office on 2/3 worried about his blood pressure. He hadn't been taking the carvedilol. He restarted that but continued to have elevated blood pressures. He missed office follow up appts. He has untreated sleep apnea. Changes and loss of insurance has effected his compliance.     He came to the emergency room yesterday with continued elevated blood pressure readings. Systolics in the 200s. He was intermittently taking  hydralazine pills and it wasn't helping. He was given IV labetolol and BP improved to 170/100 so he was released home.     Unfortunately he came back several hours later - /135 . He was admitted to the hospitalists. Temporarily  on nicardipine. That has since been stopped. Carvedilol has been increased. Lisinopril and amlodipine continued. Secondary hypertensive work up has been initiated with - aldosterone level/VMA, serotonin ,catecholamines in the urine. Renal artery duplex ok.     He is feeling better. BP is better.     Echo 1/26/25    Left ventricular ejection fraction appears to be 56 - 60%.    The left ventricular cavity is borderline dilated.    Left ventricular diastolic function was normal.    Estimated right ventricular systolic pressure from tricuspid regurgitation is normal (<35 mmHg).      No Active Allergies    REVIEW OF SYSTEMS:   All systems reviewed.  Pertinent positives identified in HPI.  All other systems are negative.      Objective:     Vitals:    02/24/25 1500 02/24/25 1600 02/24/25 1800 02/24/25 1954   BP: 156/83 147/100 (!) 153/103    BP Location:       Patient Position:       Pulse: 57 60 64 59   Resp:       Temp:    97.7 °F (36.5 °C)   TempSrc:    Oral   SpO2: 94% 94% 95% 93%   Weight:       Height:         Body mass index is 28.87 kg/m².    Physical Exam:  Constitutional: He is oriented to person, place, and time. He appears well-developed. He does not appear ill.   HENT:   Head: Normocephalic and atraumatic. Head is without contusion.   Right Ear: Hearing normal. No drainage.   Left Ear: Hearing normal. No drainage.   Nose: No nasal deformity. No epistaxis.   Eyes: Lids are normal. Right eye exhibits no exudate. Left eye exhibits no exudate.  Neck: No JVD present. Carotid bruit is not present. No tracheal deviation present. No thyroid mass and no thyromegaly present.   Cardiovascular: Normal rate, regular rhythm and normal heart sounds.    Pulses:       Posterior tibial pulses are  2+ on the right side, and 2+ on the left side.   Pulmonary/Chest: Effort normal and breath sounds normal.   Abdominal: Soft. Normal appearance and bowel sounds are normal. There is no tenderness.   Musculoskeletal: Normal range of motion.        Right shoulder: He exhibits no deformity.        Left shoulder: He exhibits no deformity.   Neurological: He is alert and oriented to person, place, and time. He has normal strength.   Skin: Skin is warm, dry and intact. No rash noted.   Psychiatric: He has a normal mood and affect. His behavior is normal. Thought content normal.   Vitals reviewed      Lab Review:     Results from last 7 days   Lab Units 02/24/25  0351 02/23/25  0057   SODIUM mmol/L 138 138   POTASSIUM mmol/L 3.7 4.1   CHLORIDE mmol/L 106 107   CO2 mmol/L 22.5 21.0*   BUN mg/dL 14 15   CREATININE mg/dL 0.82 0.83   CALCIUM mg/dL 9.3 9.3   BILIRUBIN mg/dL  --  0.4   ALK PHOS U/L  --  75   ALT (SGPT) U/L  --  22   AST (SGOT) U/L  --  17   GLUCOSE mg/dL 111* 115*     Results from last 7 days   Lab Units 02/23/25  1051 02/23/25  0952 02/23/25  0057   HSTROP T ng/L <6 <6 <6     Results from last 7 days   Lab Units 02/24/25  0351   WBC 10*3/mm3 7.65   HEMOGLOBIN g/dL 15.5   HEMATOCRIT % 46.6   PLATELETS 10*3/mm3 273               Current Facility-Administered Medications:     acetaminophen (TYLENOL) tablet 650 mg, 650 mg, Oral, Q4H PRN, 650 mg at 02/24/25 0931 **OR** acetaminophen (TYLENOL) 160 MG/5ML oral solution 650 mg, 650 mg, Oral, Q4H PRN **OR** acetaminophen (TYLENOL) suppository 650 mg, 650 mg, Rectal, Q4H PRN, Ester Bright MD    amLODIPine (NORVASC) tablet 5 mg, 5 mg, Oral, Q24H, Ester Bright MD, 5 mg at 02/24/25 1319    aspirin EC tablet 81 mg, 81 mg, Oral, Daily, Ester Bright MD, 81 mg at 02/24/25 0931    atorvastatin (LIPITOR) tablet 40 mg, 40 mg, Oral, Nightly, Ester Bright MD    sennosides-docusate (PERICOLACE) 8.6-50 MG per tablet 2 tablet, 2 tablet, Oral, BID, 2 tablet at  02/24/25 0931 **AND** polyethylene glycol (MIRALAX) packet 17 g, 17 g, Oral, Daily PRN **AND** bisacodyl (DULCOLAX) EC tablet 5 mg, 5 mg, Oral, Daily PRN **AND** bisacodyl (DULCOLAX) suppository 10 mg, 10 mg, Rectal, Daily PRN, Ester Bright MD    busPIRone (BUSPAR) tablet 10 mg, 10 mg, Oral, Q8H, Ester Bright MD, 10 mg at 02/24/25 1319    Calcium Replacement - Follow Nurse / BPA Driven Protocol, , Not Applicable, PRN, Ester Bright MD    carvedilol (COREG) tablet 12.5 mg, 12.5 mg, Oral, BID With Meals, Ester Bright MD, 12.5 mg at 02/24/25 1749    fluticasone (FLONASE) 50 MCG/ACT nasal spray 2 spray, 2 spray, Each Nare, Daily, Marcy Hillman APRN    folic acid (FOLVITE) tablet 1 mg, 1 mg, Oral, Daily, Ester Bright MD, 1 mg at 02/24/25 0931    Lidocaine 4 % 2 patch, 2 patch, Transdermal, Q24H, Steffen Preciado, JANEEN, 2 patch at 02/23/25 2136    lisinopril (PRINIVIL,ZESTRIL) tablet 40 mg, 40 mg, Oral, Daily, Ester Bright MD, 40 mg at 02/24/25 0931    Magnesium Standard Dose Replacement - Follow Nurse / BPA Driven Protocol, , Not Applicable, PRN, Ester Bright MD    melatonin tablet 5 mg, 5 mg, Oral, Nightly PRN, Ester Bright MD, 5 mg at 02/23/25 2200    multivitamin with minerals 1 tablet, 1 tablet, Oral, Daily, Ester Bright MD, 1 tablet at 02/24/25 0931    nitroglycerin (NITROSTAT) SL tablet 0.4 mg, 0.4 mg, Sublingual, Q5 Min PRN, Ester Bright MD    ondansetron ODT (ZOFRAN-ODT) disintegrating tablet 4 mg, 4 mg, Oral, Q6H PRN **OR** ondansetron (ZOFRAN) injection 4 mg, 4 mg, Intravenous, Q6H PRN, Ester Bright MD    pantoprazole (PROTONIX) EC tablet 40 mg, 40 mg, Oral, QAM ACDeangelo Samer H, MD, 40 mg at 02/24/25 0545    Phosphorus Replacement - Follow Nurse / BPA Driven Protocol, , Not Applicable, PRDeangelo ENGLISH Samer H, MD    Potassium Replacement - Follow Nurse / BPA Driven Protocol, , Not Applicable, Deangelo OLIVEIRA Samer H, MD    sodium chloride 0.9 %  flush 10 mL, 10 mL, Intravenous, Q12H, Ester Bright MD, 10 mL at 02/24/25 0932    sodium chloride 0.9 % flush 10 mL, 10 mL, Intravenous, PRN, Ester Bright MD    sodium chloride 0.9 % infusion 40 mL, 40 mL, Intravenous, PRN, Ester Bright MD    thiamine (VITAMIN B-1) tablet 100 mg, 100 mg, Oral, Daily, Ester Bright MD, 100 mg at 02/24/25 0931    vilazodone (VIIBRYD) tablet 40 mg, 40 mg, Oral, Daily, Ester Bright MD, 40 mg at 02/24/25 0931    Assessment and Plan:       Hypertensive urgency   Recurrent syncope, loop recorder in place.   History of left cerebellar CVA August 2023  Paroxysmal atrial fibrillation, low burden. AC stopped risk >> benefit due to falls and syncope  Untreated sleep apnea - CPAP has been recommended in the past. Changes and loss of insurance affected his compliance with follow up for this. I stress the importance of treating this and how is is linked to #1.   Hyperlipidemia     This has been a long standing problem for him.   Secondary hypertension work up underway.     He has been on numerous antiHTNs in the past.     He was on spironolactone in the past. Per records it looked like this worked well. I will restart and follow his BP trends.       Gin Barron, APRN  02/24/25  20:17 EST          Electronically signed by Carlos Foster MD at 02/25/25 0793

## 2025-02-27 NOTE — PLAN OF CARE
Problem: Adult Inpatient Plan of Care  Goal: Plan of Care Review  Outcome: Progressing  Flowsheets (Taken 2/27/2025 0310)  Progress: no change  Outcome Evaluation: Vitals stable, No c/o pain, BP improved, pt was anxious earlier this shift but slept well through the night, ambulated with staff a couple laps around nursing station this am, access called to follow up on pt, possible d/c today, if not, access will see pt tomorrow, plan of care on going.  Plan of Care Reviewed With: patient

## 2025-02-28 ENCOUNTER — TRANSITIONAL CARE MANAGEMENT TELEPHONE ENCOUNTER (OUTPATIENT)
Dept: CALL CENTER | Facility: HOSPITAL | Age: 60
End: 2025-02-28
Payer: COMMERCIAL

## 2025-02-28 LAB
5OH-INDOLEACETATE 24H UR-MCNC: 7.2 MG/L
5OH-INDOLEACETATE 24H UR-MRATE: 5.4 MG/24 HR (ref 0–14.9)
ALDOST SERPL-MCNC: 1.8 NG/DL (ref 0–30)
DOPAMINE 24H UR-MRATE: 230 UG/24 HR (ref 0–510)
DOPAMINE UR-MCNC: 307 UG/L
EPINEPH 24H UR-MRATE: 17 UG/24 HR (ref 0–20)
EPINEPH UR-MCNC: 23 UG/L
NOREPINEPH 24H UR-MRATE: 98 UG/24 HR (ref 0–135)
NOREPINEPH UR-MCNC: 131 UG/L
VMA 24H UR-MRATE: 8 MG/24 HR (ref 0–7.5)
VMA UR-MCNC: 10.6 MG/L

## 2025-02-28 NOTE — PROGRESS NOTES
Outgoing call to the patient - he requested the week of 03/17 - scheduled for follow up.   All information verbally understood.     Thanks  Priti

## 2025-02-28 NOTE — OUTREACH NOTE
Call Center TCM Note      Flowsheet Row Responses   List of hospitals in [place] patient discharged from? [facility]   Does the patient have one of the following disease processes/diagnoses(primary or secondary)? Other   TCM attempt successful? Yes   Call start time 1113   Call end time 1119   List who call center can speak with pt   Meds reviewed with patient/caregiver? Yes   Is the patient having any side effects they believe may be caused by any medication additions or changes? No   Does the patient have all medications ordered at discharge? Yes   Is the patient taking all medications as directed (includes completed medication regime)? Yes   Comments There are no hospital f/u appointments avilable. Message sent to the office to call pt.   Does the patient have an appointment with their PCP within 7-14 days of discharge? No appointments available   Nursing Interventions Routed TCM call to PCP office   Has home health visited the patient within 72 hours of discharge? N/A   Psychosocial issues? No   Did the patient receive a copy of their discharge instructions? Yes   Nursing interventions Reviewed instructions with patient   What is the patient's perception of their health status since discharge? Improving   Is the patient/caregiver able to teach back signs and symptoms related to disease process for when to call PCP? Yes   Is the patient/caregiver able to teach back signs and symptoms related to disease process for when to call 911? Yes   Is the patient/caregiver able to teach back the hierarchy of who to call/visit for symptoms/problems? PCP, Specialist, Home health nurse, Urgent Care, ED, 911 Yes   TCM call completed? Yes   Wrap up additional comments Pt reports improving. Pt is checking bp often. This am bp reading 161/100. Pt is taking all medications. Pt to call cardiology if bp increase prior to coming to ED. Pt very knowledgeable.   Call end time 1119   Would this patient benefit from a Referral to Amb Social Work? No    Is the patient interested in additional calls from an ambulatory ? No            Rosalia Serra RN    2/28/2025, 11:21 EST

## 2025-02-28 NOTE — OUTREACH NOTE
Prep Survey      Flowsheet Row Responses   [facility] patient discharged from? [facility]   Is LACE score < 7 ? No   Eligibility [facility]   Date of Admission 02/23/25   Date of Discharge 02/27/25   Discharge Disposition Home or Self Care   Discharge diagnosis Hypertensive urgency   Does the patient have one of the following disease processes/diagnoses(primary or secondary)? Other   Does the patient have Home health ordered? No   Is there a DME ordered? No   Prep survey completed? Yes            Maryanne BARRIOS - Registered Nurse

## 2025-03-03 LAB
ALDOST SERPL-MCNC: 3.3 NG/DL (ref 0–30)
ALDOST/RENIN PLAS-RTO: 3.5 {RATIO} (ref 0–30)
RENIN PLAS-CCNC: 0.93 NG/ML/HR (ref 0.17–5.38)

## 2025-03-04 LAB
METANEPH FREE SERPL-MCNC: 38.3 PG/ML (ref 0–88)
NORMETANEPHRINE SERPL-MCNC: 96.2 PG/ML (ref 0–244)

## 2025-03-10 ENCOUNTER — READMISSION MANAGEMENT (OUTPATIENT)
Dept: CALL CENTER | Facility: HOSPITAL | Age: 60
End: 2025-03-10
Payer: COMMERCIAL

## 2025-03-12 ENCOUNTER — HOSPITAL ENCOUNTER (OUTPATIENT)
Dept: CARDIOLOGY | Facility: HOSPITAL | Age: 60
Discharge: HOME | End: 2025-03-12
Admitting: NURSE PRACTITIONER
Payer: COMMERCIAL

## 2025-03-12 ENCOUNTER — OFFICE VISIT (OUTPATIENT)
Dept: CARDIOLOGY | Facility: CLINIC | Age: 60
End: 2025-03-12
Payer: COMMERCIAL

## 2025-03-12 VITALS
BODY MASS INDEX: 28.75 KG/M2 | HEIGHT: 74 IN | HEART RATE: 61 BPM | SYSTOLIC BLOOD PRESSURE: 130 MMHG | WEIGHT: 224 LBS | OXYGEN SATURATION: 94 % | DIASTOLIC BLOOD PRESSURE: 82 MMHG

## 2025-03-12 DIAGNOSIS — I48.0 PAF (PAROXYSMAL ATRIAL FIBRILLATION): ICD-10-CM

## 2025-03-12 DIAGNOSIS — I63.542 OCCLUSION OF LEFT POSTERIOR INFERIOR CEREBELLAR ARTERY WITH INFARCTION: ICD-10-CM

## 2025-03-12 DIAGNOSIS — R55 SYNCOPE, UNSPECIFIED SYNCOPE TYPE: ICD-10-CM

## 2025-03-12 DIAGNOSIS — I10 ESSENTIAL HYPERTENSION: Primary | Chronic | ICD-10-CM

## 2025-03-12 DIAGNOSIS — Z86.79 HISTORY OF VERTEBRAL ARTERY STENOSIS: ICD-10-CM

## 2025-03-12 DIAGNOSIS — Z95.818 IMPLANTABLE LOOP RECORDER PRESENT: ICD-10-CM

## 2025-03-12 LAB
ANION GAP SERPL CALCULATED.3IONS-SCNC: 11.4 MMOL/L (ref 5–15)
BUN SERPL-MCNC: 17 MG/DL (ref 6–20)
BUN/CREAT SERPL: 18.1 (ref 7–25)
CALCIUM SPEC-SCNC: 9.4 MG/DL (ref 8.6–10.5)
CHLORIDE SERPL-SCNC: 104 MMOL/L (ref 98–107)
CO2 SERPL-SCNC: 21.6 MMOL/L (ref 22–29)
CREAT SERPL-MCNC: 0.94 MG/DL (ref 0.76–1.27)
EGFRCR SERPLBLD CKD-EPI 2021: 94.6 ML/MIN/1.73
GLUCOSE SERPL-MCNC: 103 MG/DL (ref 65–99)
POTASSIUM SERPL-SCNC: 4.2 MMOL/L (ref 3.5–5.2)
SODIUM SERPL-SCNC: 137 MMOL/L (ref 136–145)

## 2025-03-12 PROCEDURE — 80048 BASIC METABOLIC PNL TOTAL CA: CPT | Performed by: NURSE PRACTITIONER

## 2025-03-12 PROCEDURE — 36415 COLL VENOUS BLD VENIPUNCTURE: CPT

## 2025-03-12 RX ORDER — CARVEDILOL 12.5 MG/1
12.5 TABLET ORAL 2 TIMES DAILY WITH MEALS
Qty: 180 TABLET | Refills: 3 | Status: SHIPPED | OUTPATIENT
Start: 2025-03-12

## 2025-03-12 RX ORDER — AMLODIPINE BESYLATE 10 MG/1
10 TABLET ORAL
Qty: 90 TABLET | Refills: 3 | Status: SHIPPED | OUTPATIENT
Start: 2025-03-12

## 2025-03-12 RX ORDER — SPIRONOLACTONE 50 MG/1
50 TABLET, FILM COATED ORAL DAILY
Qty: 90 TABLET | Refills: 3 | Status: SHIPPED | OUTPATIENT
Start: 2025-03-12

## 2025-03-12 RX ORDER — LISINOPRIL 40 MG/1
40 TABLET ORAL DAILY
Qty: 90 TABLET | Refills: 3 | Status: SHIPPED | OUTPATIENT
Start: 2025-03-12

## 2025-03-12 NOTE — PROGRESS NOTES
Date of Office Visit: 2025  Encounter Provider: JANEEN Carmichael  Place of Service: [facility] CARDIOLOGY  Patient Name: Flako Coreas Sr.  :1965  Primary Cardiologist: Dr. Chinmay Taylor     Chief Complaint   Patient presents with    Atrial Fibrillation    Syncope    Hypertension    Hospital Follow Up Visit   :     HPI: Flako Coreas Sr. is a 59 y.o. male who presents today for hospital follow-up visit.  He is a new patient to me and I reviewed his medical records.    He has been diagnosed with hypertension, hyperlipidemia, elevated lipoprotein a, obstructive sleep apnea on CPAP, and previous stroke.      In 2023, he was hospitalized for hypertensive emergency and acute left cerebellar strokes.  He had been having recurrent syncope that year.  He has known coronary artery calcification.  In 2023, MEGHANN showed hyperdynamic EF 70%, mild to moderate LVH, grade 1 diastolic dysfunction, left atrium mildly dilated, mild plaque in the distal descending thoracic aorta and saline test negative.  Stress test was equivocal.  30-day monitor normal.  14-day Holter monitor showed normal sinus rhythm with 14 brief episodes of nonsustained supraventricular tachycardia. He has also followed up with neurology.    In 2024, he had a loop recorder implant for evaluation of syncope.  In 2024, he had a few episodes of brief atrial fibrillation with the longest at 16 minutes in duration.     In 2024, he saw Dr. Taylor.  Since he had a brief episode of atrial fibrillation, they discussed the risk versus benefits of being on anticoagulation.  Through shared decision making, patient opted to start apixaban and aspirin was discontinued.  He was noted to have elevated lipoprotein and was started on Repatha and recommended to continue rosuvastatin.    In May 2024, he had a brief episode of atrial fibrillation 24 minutes in duration.      In 2024, he had a  syncopal episode and loop recorder showed a associated 13-second pause at that time.  July 2024, he saw JANEEN Champagne and Dr. Pk Langford.  They felt his episode was vasovagal and did not recommend a pacemaker.  They felt like his atrial fibrillation burden was so low and the medication was expensive for him.  They told him he could stop the apixaban.  His blood pressure was not well-controlled and he had been out of some of his medications.    In August 2024, he had 7 brief episodes of atrial fibrillation with a burden of 0.8%.  It was decided that he would resume his apixaban and he had already done so.  He had another syncopal episode in August 2024 nothing correlated on the loop recorder.    In October 2024, he had dizziness and a fall.  He did not pass out.  There was nothing that correlated on loop recorder.  In December 2024, he had another fall and there was nothing that correlated on loop recorder.    January 2025, echocardiogram showed normal LVEF, LV cavity borderline dilated    In February 2025, he was hospitalized for syncope, alcohol withdrawal, and DTs mood disorder.  His apixaban was discontinued because of his risk of falls and alcohol use.  His blood pressure was elevated. Dr. Taylor recommended that he restart carvedilol 3.125 mg twice per day recommended referral to [facility] sleep medicine.  Patient declined.  He was hospitalized again later that month with a hypertensive urgency.  Spironolactone was added to his regimen, amlodipine increased to 10 mg daily, carvedilol increased to 12.5 mg twice per day.  He was recommended to continue with lisinopril 40 mg daily.    He presents today with his son accompanying him.  He has been compliant with his antihypertensives and his blood pressure is well-controlled today.  He really does not want to pass out anymore.  He has not had any recent syncope.  He reports shortness of breath, brief fluttering sensations, near syncope/falls, weakness, balance,  and cognitive issues.  Is in the process of seeing sleep medicine.  He has not followed up with the neurologist.  He did not have insurance for part of last year.      Past Medical History:   Diagnosis Date    ADHD (attention deficit hyperactivity disorder)     On going issue    Anxiety     Lepe's esophagus     Benign prostatic hyperplasia Surgery in 12/2021    Brain concussion 11/2023    Clotting disorder Intestinal    Depression     Diverticulitis     Diverticulosis     Duodenitis     Enteritis     Failure to thrive (child)     Family history of blood clots     Fracture of wrist 1985    Fracture, foot 2019    GERD (gastroesophageal reflux disease)     Hyperlipidemia     Hypertension     Hypertensive emergency     Irritable bowel syndrome 2017    Low testosterone     Microcytosis     Pancreatitis     Peptic ulcer disease 2/23/2025    Pneumonia     PONV (postoperative nausea and vomiting)     Seasonal affective disorder     Shoulder injury     Stroke     Unexplained weight loss     Visual impairment 8/2023       Past Surgical History:   Procedure Laterality Date    CARDIAC ELECTROPHYSIOLOGY PROCEDURE N/A 01/24/2024    Procedure: Loop insertion- [employer] LINQ;  Surgeon: Kaela Walker MD;  Location: [facility] CATH INVASIVE LOCATION;  Service: Cardiovascular;  Laterality: N/A;    COLON SURGERY      COLONOSCOPY      COLONOSCOPY N/A 12/11/2022    Procedure: COLONOSCOPY INTO CECUM WITH HOT SNARE POLYPECTOMY;  Surgeon: Albert Gallo MD;  Location: [facility] ENDOSCOPY;  Service: Gastroenterology;  Laterality: N/A;  PRE- GI BLEED  POST- DIVERTICULOSIS, POLYP    COLONOSCOPY N/A 02/14/2024    Procedure: COLONOSCOPY into cecum/terminal ileum;  Surgeon: Albert Gallo MD;  Location: [facility] ENDOSCOPY;  Service: Gastroenterology;  Laterality: N/A;  diverticulosis, hemorrhoids    ENDOSCOPY N/A 12/10/2022    Procedure: ESOPHAGOGASTRODUODENOSCOPY;  Surgeon: Albert Gallo MD;  Location: [facility] ENDOSCOPY;  Service:  Gastroenterology;  Laterality: N/A;  PRE- DARK STOOLS  POST- BARRETTS ESOPHAGUS    ENDOSCOPY N/A 02/14/2024    Procedure: ESOPHAGOGASTRODUODENOSCOPY with biopsy;  Surgeon: Albert Gallo MD;  Location: [facility] ENDOSCOPY;  Service: Gastroenterology;  Laterality: N/A;  long segment wilson's, hiatal hernia    FRACTURE SURGERY Right 1980    for broken arm    FRACTURE SURGERY Right     for broken hand    HAND SURGERY  1990    INCISION AND DRAINAGE ABSCESS  2015    anal    PROSTATE SURGERY      SHOULDER SURGERY  2022    SMALL INTESTINE SURGERY      TONSILLECTOMY      TRIGGER POINT INJECTION  2017       Social History     Socioeconomic History    Marital status: Single   Tobacco Use    Smoking status: Never    Smokeless tobacco: Never   Vaping Use    Vaping status: Never Used   Substance and Sexual Activity    Alcohol use: Not Currently    Drug use: Not Currently     Frequency: 1.0 times per week    Sexual activity: Not Currently     Partners: Female     Birth control/protection: Other, Birth control pill       Family History   Problem Relation Age of Onset    Stroke Mother     Heart disease Mother     Stroke Father     Hyperlipidemia Father     Hypertension Father        The following portion of the patient's history were reviewed and updated as appropriate: past medical history, past surgical history, past social history, past family history, allergies, current medications, and problem list.    Review of Systems   Cardiovascular:  Positive for dyspnea on exertion.   Respiratory:  Positive for shortness of breath.    Neurological:  Positive for loss of balance and weakness.       No Known Allergies      Current Outpatient Medications:     amLODIPine (NORVASC) 10 MG tablet, Take 1 tablet by mouth Daily., Disp: 30 tablet, Rfl: 1    ASPIRIN 81 PO, Take 81 mg by mouth Daily., Disp: , Rfl:     atorvastatin (LIPITOR) 40 MG tablet, Take 1 tablet by mouth Every Night., Disp: 30 tablet, Rfl: 0    busPIRone (BUSPAR) 10 MG tablet,  "Take 1 tablet by mouth Every 8 (Eight) Hours., Disp: 90 tablet, Rfl: 0    carvedilol (COREG) 12.5 MG tablet, Take 1 tablet by mouth 2 (Two) Times a Day With Meals., Disp: 60 tablet, Rfl: 1    folic acid (FOLVITE) 1 MG tablet, Take 1 tablet by mouth Daily., Disp: 30 tablet, Rfl: 0    lisinopril (PRINIVIL,ZESTRIL) 40 MG tablet, Take 1 tablet by mouth Daily for 30 doses., Disp: 30 tablet, Rfl: 0    pantoprazole (PROTONIX) 40 MG EC tablet, Take 1 tablet by mouth Every Morning Before Breakfast., Disp: 30 tablet, Rfl: 0    spironolactone (ALDACTONE) 50 MG tablet, Take 1 tablet by mouth Daily., Disp: 60 tablet, Rfl: 1    thiamine (VITAMIN B1) 100 MG tablet, Take 1 tablet by mouth Daily., Disp: 30 tablet, Rfl: 0    vilazodone (Viibryd) 40 MG tablet tablet, Take 1 tablet by mouth Daily. (Patient taking differently: Take 0.5 tablets by mouth Daily.), Disp: 90 tablet, Rfl: 1    vitamin B-12 (CYANOCOBALAMIN) 500 MCG tablet, Take 1 tablet by mouth Daily., Disp: 90 tablet, Rfl: 0         Objective:     Vitals:    03/12/25 1312   BP: 130/82   BP Location: Right arm   Patient Position: Sitting   Cuff Size: Adult   Pulse: 61   SpO2: 94%   Weight: 102 kg (224 lb)   Height: 188 cm (74.02\")     Body mass index is 28.75 kg/m².    PHYSICAL EXAM:    Vitals Reviewed.   General Appearance: No acute distress, well developed and well nourished.   HENT: No hearing loss noted.    Respiratory: No signs of respiratory distress. Respiration rhythm and depth normal.  Clear to auscultation.   Cardiovascular:  Jugular Venous Pressure: Normal  Heart Rate and Rhythm: Normal, Heart Sounds: Normal S1 and S2. No S3 or S4 noted.  Murmurs: No murmurs noted. No rubs, thrills, or gallops.   Lower Extremities: No edema noted.  Musculoskeletal: Normal movement of extremities.  Skin: General appearance normal.    Psychiatric: Patient alert and oriented to person, place, and time. Speech and behavior appropriate. Normal mood and affect.       ECG 12 " Lead    Date/Time: 3/12/2025 1:13 PM  Performed by: Stacy Jeong APRN    Authorized by: Stacy Jeong APRN  Comparison: compared with previous ECG from 2/23/2025  Similar to previous ECG  Rhythm: sinus rhythm  Rate: normal  BPM: 61  Q waves: III and aVF    ST Segments: ST segments normal  T Waves: T waves normal  QRS axis: normal    Clinical impression: abnormal EKG            Assessment:       Diagnosis Plan   1. Essential hypertension  Ambulatory Referral to Neurology      2. Syncope, unspecified syncope type  Ambulatory Referral to Neurology    Tilt Table    Basic Metabolic Panel      3. PAF (paroxysmal atrial fibrillation)  Ambulatory Referral to Neurology    Basic Metabolic Panel      4. 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 History of vertebral artery stenosis  Ambulatory Referral to Neurology             Plan:       1.  Hypertension: Blood pressure is excellent on current medications.  I stressed the importance of compliance.  I have refilled the prescriptions for a full year.  BMP today.    2.  Syncope of unknown etiology.  Last year, Dr. Taylor ordered a tilt table and he could not have it done because he had lost his insurance.  I have ordered a tilt table to be completed.  I have reassured him that his other syncopal and near syncopal episodes have not shown any cardiac abnormalities on his loop recorder.    3.  In June 2024, he saw Dr. Langford.  He had a 13-second pause on loop recorder which was felt to be due to vasovagal syncope.  He did not feel that he needed a pacemaker.  He has had further episodes of near syncope and syncope without pauses.    4.  Paroxysmal Atrial Fibrillation: This has been noted on loop recorder downloads.  Atrial fibrillation burden is typically less than 0.5%.  He has a [id number] score of 4.  Due to his frequent falls, it has been determined that the risk of bleeding is higher than the  risk of stroke.  He is no longer on apixaban.    5.  Dr. Taylor is also recommended that he see sleep medicine and he said that he is getting that arranged through his PCP.    6.  History of prior stroke and vertebral artery stenosis.  He has not seen neurology outpatient and I have placed an ambulatory referral to see Dr. Colby.    7.  We will continue to monitor his loop recorder tracings.    8.  Follow-up with Dr. Taylor in 3 months.    As always, it has been a pleasure to participate in your patient's care. Thank you.         Sincerely,         JANEEN Kennedy  [facility] Cardiology      Dictated utilizing Dragon Dictation  I spent 52 minutes reviewing her medical records/testing/previous office notes/labs, face-to-face interaction with patient, physical examination, formulating the plan of care, and discussion of plan of care with patient.

## 2025-03-17 ENCOUNTER — OFFICE VISIT (OUTPATIENT)
Dept: SPORTS MEDICINE | Facility: CLINIC | Age: 60
End: 2025-03-17
Payer: COMMERCIAL

## 2025-03-17 VITALS
RESPIRATION RATE: 16 BRPM | HEIGHT: 74 IN | HEART RATE: 66 BPM | WEIGHT: 224 LBS | SYSTOLIC BLOOD PRESSURE: 100 MMHG | DIASTOLIC BLOOD PRESSURE: 60 MMHG | OXYGEN SATURATION: 98 % | BODY MASS INDEX: 28.75 KG/M2

## 2025-03-17 DIAGNOSIS — I10 ESSENTIAL HYPERTENSION: ICD-10-CM

## 2025-03-17 DIAGNOSIS — Z86.73 HISTORY OF CVA (CEREBROVASCULAR ACCIDENT): Primary | ICD-10-CM

## 2025-03-17 DIAGNOSIS — R29.818 SUSPECTED SLEEP APNEA: ICD-10-CM

## 2025-03-17 DIAGNOSIS — R45.89 ANXIETY ABOUT HEALTH: ICD-10-CM

## 2025-03-17 PROBLEM — R55 VASOVAGAL SYNCOPE: Status: RESOLVED | Noted: 2024-03-15 | Resolved: 2025-03-17

## 2025-03-17 PROBLEM — R55 SYNCOPE: Status: RESOLVED | Noted: 2025-01-25 | Resolved: 2025-03-17

## 2025-03-17 PROBLEM — R73.9 HYPERGLYCEMIA: Status: RESOLVED | Noted: 2025-02-23 | Resolved: 2025-03-17

## 2025-03-17 NOTE — PROGRESS NOTES
Flako is a 59 y.o. year old male presents to [facility]    Chief Complaint   Patient presents with    Hospital Follow Up Visit     Hosp f/u - admission 02/23/2025-02/27/2025 - here with son for today's visit        History of Present Illness  History of Present Illness  The patient presents for evaluation of blood pressure management, anxiety, sleep apnea, and stroke residuals.    He reports a decrease in his blood pressure, which he attributes to the continuation of his medication regimen as advised by his cardiologist. He has been abstaining from alcohol since his last hospital admission, with the exception of Super Sunday. He is currently not receiving any occupational therapy at home. He has experienced 2 falls post-hospitalization, which he describes as a result of leg weakness rather than fainting. He is awaiting a call regarding outpatient therapy. He has resumed his Viibryd medication, initially at a reduced dose of 20 mg instead of 40 mg, due to a previous discontinuation. He has a history of Prozac use, which he found beneficial. He has undergone genetic testing. He is currently on all necessary medications but anticipates needing refills by the end of the month. He has been advised against the use of benzodiazepines due to memory loss experienced during a 5-day ICU stay.    He has been approved for a sleep study but is awaiting insurance confirmation before proceeding. He has completed the overnight portion of the study and does not believe a repeat study is necessary. He has been prescribed a CPAP machine by his sleep specialist.    He has been experiencing anxiety, which has led to social withdrawal and difficulty in organizing his thoughts. He has discontinued therapy due to insurance issues. He reports episodes of severe anxiety, including one incident where he was unable to order food at a [employer] restaurant due to cognitive impairment. He is awaiting a consultation  "with a neurologist. He reports memory and organizational difficulties, as well as challenges with verbal communication. He has been experiencing shoulder and hip pain following falls, but x-rays have ruled out fractures. He manages his pain with Advil and does not wish to take stronger pain medications.    His psychiatrist has suggested the addition of Lexapro to his current Viibryd regimen. He has been experiencing anxiety, which has led to social withdrawal and difficulty in organizing his thoughts. He has discontinued therapy due to insurance issues. He reports episodes of severe anxiety, including one incident where he was unable to order food at a [employer] restaurant due to cognitive impairment.    SOCIAL HISTORY  He reports no alcohol intake since his stroke.    MEDICATIONS  Current: Viibryd, Advil    Discharge Summary by Alden Rausch MD (02/27/2025 08:47)     I have reviewed the patient's medical, family, and social history in detail and updated the computerized patient record.    /60 (BP Location: Right arm, Patient Position: Sitting, Cuff Size: Adult)   Pulse 66   Resp 16   Ht 188 cm (74.02\")   Wt 102 kg (224 lb)   SpO2 98%   BMI 28.75 kg/m²      Physical Exam    Vital signs reviewed.   General: No acute distress.  Eyes: conjunctiva clear; pupils equally round and reactive  ENT: external ears atraumatic  CV: no peripheral edema  Resp: normal respiratory effort, no use of accessory muscles  Skin: no rashes or wounds; normal turgor  Psych: mood and affect appropriate; recent and remote memory intact  Neuro: sensation to light touch intact      Physical Exam          Common labs          2/26/2025    03:50 2/26/2025    16:19 2/27/2025    03:12 3/12/2025    13:52   Common Labs   Glucose 107   97  103    BUN 14   10  17    Creatinine 0.86   0.81  0.94    Sodium 141   145  137    Potassium 3.2  3.5  4.5  4.2    Chloride 111   115  104    Calcium 8.7   8.7  9.4    WBC 8.17   7.20   "   Hemoglobin 15.1   14.6     Hematocrit 46.2   44.3     Platelets 228   228              Diagnoses and all orders for this visit:    History of CVA (cerebrovascular accident)    Essential hypertension    Anxiety about health    Suspected sleep apnea  -     Ambulatory Referral to Sleep Medicine      Assessment & Plan  1. Blood pressure management.  His blood pressure readings are within the normal range today. He will maintain his current medication regimen as advised by his cardiologist. A tilt table test is scheduled for Friday. He will continue to follow up with his cardiologist for ongoing management.    2. Anxiety.  He has been experiencing significant anxiety, which affects his daily activities. He will increase his Viibryd dosage from 20 mg to 40 mg to help manage his anxiety symptoms. He is advised to continue with his current medication regimen and monitor for any side effects.    3. Sleep apnea.  He will contact the sleep medicine department to obtain his CPAP machine, which is expected to help with both his sleep quality and blood pressure control. An order has been placed with the sleep medicine department to expedite this process.    4. Stroke residuals.  He reports issues with memory, organization, and cognitive function, which may be residual effects of his stroke. He will follow up with a neurologist to address these concerns and to discuss the possibility of occupational therapy.    Follow-up  The patient will follow up in 3 months for a wellness visit and physical examination.      I spent 50 minutes caring for Flako on this date of service. This time includes time spent by me in the following activities:preparing for the visit, reviewing tests, obtaining and/or reviewing a separately obtained history, performing a medically appropriate examination and/or evaluation , counseling and educating the patient/family/caregiver, ordering medications, tests, or procedures, referring and communicating with  other health care professionals , documenting information in the medical record, and independently interpreting results and communicating that information with the patient/family/caregiver    Follow Up     Patient was given instructions and counseling regarding his condition or for health maintenance advice. Please see specific information pulled into the AVS if appropriate.     Patient or patient representative verbalized consent for the use of Ambient Listening during the visit with  SIMRAN Rodriguez Jr., DO for chart documentation. 3/17/2025  13:54 EDT

## 2025-03-20 ENCOUNTER — HOSPITAL ENCOUNTER (OUTPATIENT)
Dept: CARDIOLOGY | Facility: HOSPITAL | Age: 60
Discharge: HOME | End: 2025-03-20
Admitting: STUDENT IN AN ORGANIZED HEALTH CARE EDUCATION/TRAINING PROGRAM
Payer: COMMERCIAL

## 2025-03-20 VITALS
BODY MASS INDEX: 28.88 KG/M2 | OXYGEN SATURATION: 99 % | SYSTOLIC BLOOD PRESSURE: 142 MMHG | TEMPERATURE: 96.4 F | RESPIRATION RATE: 18 BRPM | WEIGHT: 225 LBS | HEIGHT: 74 IN | HEART RATE: 69 BPM | DIASTOLIC BLOOD PRESSURE: 94 MMHG

## 2025-03-20 DIAGNOSIS — R55 SYNCOPE, UNSPECIFIED SYNCOPE TYPE: ICD-10-CM

## 2025-03-20 PROCEDURE — 93660 TILT TABLE EVALUATION: CPT

## 2025-03-20 RX ORDER — SODIUM CHLORIDE 0.9 % (FLUSH) 0.9 %
10 SYRINGE (ML) INJECTION AS NEEDED
Status: DISCONTINUED | OUTPATIENT
Start: 2025-03-20 | End: 2025-03-21 | Stop reason: HOSPADM

## 2025-04-16 ENCOUNTER — TELEPHONE (OUTPATIENT)
Dept: SPORTS MEDICINE | Facility: CLINIC | Age: 60
End: 2025-04-16
Payer: COMMERCIAL

## 2025-04-16 NOTE — TELEPHONE ENCOUNTER
Patient states you have seen him recently for status post hosptial visit.  He states he is having problems with his let hip and would like to know if you want to see him or send him out. Patient states his BP is also elevated but his has his cardiologist working on it.

## 2025-04-17 DIAGNOSIS — M25.552 PAIN OF LEFT HIP: Primary | ICD-10-CM

## 2025-04-17 NOTE — TELEPHONE ENCOUNTER
Outgoing call to the patient and informed of response. Okay to PT here at [facility].     Thanks  Priti

## 2025-04-21 DIAGNOSIS — R41.89 COGNITIVE IMPAIRMENT: ICD-10-CM

## 2025-04-21 DIAGNOSIS — R41.3 MEMORY LOSS: Primary | ICD-10-CM

## 2025-04-21 DIAGNOSIS — I69.30 HISTORY OF CVA WITH RESIDUAL DEFICIT: ICD-10-CM

## 2025-04-30 ENCOUNTER — OFFICE VISIT (OUTPATIENT)
Dept: NEUROLOGY | Facility: CLINIC | Age: 60
End: 2025-04-30
Payer: COMMERCIAL

## 2025-04-30 VITALS — HEART RATE: 86 BPM | HEIGHT: 74 IN | WEIGHT: 215 LBS | OXYGEN SATURATION: 95 % | BODY MASS INDEX: 27.59 KG/M2

## 2025-04-30 DIAGNOSIS — F10.11 HISTORY OF ALCOHOL ABUSE: ICD-10-CM

## 2025-04-30 DIAGNOSIS — Z86.73 HISTORY OF STROKE: Primary | ICD-10-CM

## 2025-04-30 DIAGNOSIS — I48.0 PAROXYSMAL ATRIAL FIBRILLATION: ICD-10-CM

## 2025-04-30 DIAGNOSIS — F39 UNSPECIFIED MOOD (AFFECTIVE) DISORDER: ICD-10-CM

## 2025-04-30 DIAGNOSIS — R41.89 PSEUDODEMENTIA: ICD-10-CM

## 2025-04-30 DIAGNOSIS — F41.9 ANXIETY: ICD-10-CM

## 2025-04-30 NOTE — PROGRESS NOTES
"CC: Memory problems and History of Stroke      Patient is a 59-year-old with history of alcohol abuse, unspecified mood disorder, anxiety, hypertension, hyperlipidemia, recurrent syncopal episodes/recurrent syncope, lipoprotein a elevation and atrial fibrillation not on anticoagulation due to risk of brain bleed from falls.    Patient today is accompanied by his son for the clinic visit, as soon as I went into the clinic room and noticed the MA taking blood pressure and vitals.  Patient was very emotional crying and having a panic attack.    Asked the patient what is wrong he stated that he is not able to answer question and gave me his phone to read the notes he has written.  With stated things like \"I cannot think \", \"I cannot function\", \"I cannot prepare meals \", \"I am losing friends\"    He was extremely tremulous very anxious.  Could not provide any meaningful history.  As per the son at bedside patient was dizziness usual state of health this morning he started having this panic attack as soon as he got to the clinic.      Patient has extensive medical history has been hospitalized multiple times he has seen multiple neurology physicians.  To summarize he has a history of bilateral cerebellar strokes which are most likely related to underlying atrial fibrillation he is not anticoagulated secondary to risk of falls from recurrent syncopal episodes.  History of alcohol abuse.  Also has mood and behavioral problems and a history of anxiety he is currently not seeing a psychiatrist due to medical bill payment issues.    He denies any new weakness numbness speech or vision problems        Past Medical History:   Diagnosis Date    ADHD (attention deficit hyperactivity disorder)     On going issue    Anxiety     Lepe's esophagus     Benign prostatic hyperplasia Surgery in 12/2021    Brain concussion 11/2023    Clotting disorder Intestinal    Depression     Diverticulitis     Diverticulosis     Duodenitis     " Enteritis     Failure to thrive (child)     Family history of blood clots     Fracture of wrist 1985    Fracture, foot 2019    GERD (gastroesophageal reflux disease)     Hyperlipidemia     Hypertension     Hypertensive emergency     Implantable loop recorder present 03/12/2025    Irritable bowel syndrome 2017    Low testosterone     Microcytosis     Pancreatitis     Peptic ulcer disease 02/23/2025    Pneumonia     PONV (postoperative nausea and vomiting)     Seasonal affective disorder     Shoulder injury     Stroke     Unexplained weight loss     Visual impairment 8/2023         Past Surgical History:   Procedure Laterality Date    ABDOMINAL SURGERY      CARDIAC ELECTROPHYSIOLOGY PROCEDURE N/A 01/24/2024    Procedure: Loop insertion- [employer] LINQ;  Surgeon: Kaela Walker MD;  Location: [facility] CATH INVASIVE LOCATION;  Service: Cardiovascular;  Laterality: N/A;    COLON SURGERY      COLONOSCOPY      COLONOSCOPY N/A 12/11/2022    Procedure: COLONOSCOPY INTO CECUM WITH HOT SNARE POLYPECTOMY;  Surgeon: Albert Gallo MD;  Location: [facility] ENDOSCOPY;  Service: Gastroenterology;  Laterality: N/A;  PRE- GI BLEED  POST- DIVERTICULOSIS, POLYP    COLONOSCOPY N/A 02/14/2024    Procedure: COLONOSCOPY into cecum/terminal ileum;  Surgeon: Albert Gallo MD;  Location: [facility] ENDOSCOPY;  Service: Gastroenterology;  Laterality: N/A;  diverticulosis, hemorrhoids    ENDOSCOPY N/A 12/10/2022    Procedure: ESOPHAGOGASTRODUODENOSCOPY;  Surgeon: Albert Gallo MD;  Location: [facility] ENDOSCOPY;  Service: Gastroenterology;  Laterality: N/A;  PRE- DARK STOOLS  POST- BARRETTS ESOPHAGUS    ENDOSCOPY N/A 02/14/2024    Procedure: ESOPHAGOGASTRODUODENOSCOPY with biopsy;  Surgeon: Albert Gallo MD;  Location: [facility] ENDOSCOPY;  Service: Gastroenterology;  Laterality: N/A;  long segment wilson's, hiatal hernia    FRACTURE SURGERY Right 1980    for broken arm    FRACTURE SURGERY Right     for broken hand    GASTRECTOMY      HAND SURGERY   1990    INCISION AND DRAINAGE ABSCESS  2015    anal    PROSTATE SURGERY      SHOULDER SURGERY  2022    SKIN BIOPSY      SMALL INTESTINE SURGERY      TONSILLECTOMY      TRIGGER POINT INJECTION  2017           Current Outpatient Medications:     amLODIPine (NORVASC) 10 MG tablet, Take 1 tablet by mouth Daily., Disp: 90 tablet, Rfl: 3    atorvastatin (LIPITOR) 40 MG tablet, Take 1 tablet by mouth Every Night., Disp: 30 tablet, Rfl: 0    busPIRone (BUSPAR) 10 MG tablet, Take 1 tablet by mouth Every 8 (Eight) Hours., Disp: 90 tablet, Rfl: 0    carvedilol (COREG) 12.5 MG tablet, Take 1 tablet by mouth 2 (Two) Times a Day With Meals., Disp: 180 tablet, Rfl: 3    folic acid (FOLVITE) 1 MG tablet, Take 1 tablet by mouth Daily., Disp: 30 tablet, Rfl: 0    lisinopril (PRINIVIL,ZESTRIL) 40 MG tablet, Take 1 tablet by mouth Daily., Disp: 90 tablet, Rfl: 3    pantoprazole (PROTONIX) 40 MG EC tablet, Take 1 tablet by mouth Every Morning Before Breakfast., Disp: 30 tablet, Rfl: 0    thiamine (VITAMIN B1) 100 MG tablet, Take 1 tablet by mouth Daily., Disp: 30 tablet, Rfl: 0    vilazodone (Viibryd) 40 MG tablet tablet, Take 1 tablet by mouth Daily., Disp: 90 tablet, Rfl: 1    vitamin B-12 (CYANOCOBALAMIN) 500 MCG tablet, Take 1 tablet by mouth Daily., Disp: 90 tablet, Rfl: 0    ASPIRIN 81 PO, Take 81 mg by mouth Daily., Disp: , Rfl:     spironolactone (ALDACTONE) 50 MG tablet, Take 1 tablet by mouth Daily., Disp: 90 tablet, Rfl: 3      Family History   Problem Relation Age of Onset    Stroke Mother     Heart disease Mother     Stroke Father     Hyperlipidemia Father     Hypertension Father          Social History     Socioeconomic History    Marital status: Single   Tobacco Use    Smoking status: Never     Passive exposure: Never    Smokeless tobacco: Never   Vaping Use    Vaping status: Never Used   Substance and Sexual Activity    Alcohol use: Not Currently    Drug use: Not Currently     Frequency: 1.0 times per week    Sexual  "activity: Defer     Partners: Female     Birth control/protection: Other, Birth control pill         No Known Allergies      Pain Scale:        ROS:    Unable to be obtained due to patient having panic    Physical Exam:  Vitals:    04/30/25 1611   Pulse: 86   SpO2: 95%   Weight: 97.5 kg (215 lb)   Height: 188 cm (74.02\")     Orthostatic BP:    Body mass index is 27.59 kg/m².    General appearance: Well developed, well nourished, well groomed, alert and cooperative.   HEENT: Normocephalic.   Cardiac: Regular rate and rhythm. No murmurs.   Chest Exam: Clear to auscultation bilaterally, no wheezes, no rhonchi.  Extremities: Normal, no edema.   Skin: No rashes or birthmarks.      Higher integrative function: Oriented to time, place, person,  CN II: Normal visual acuity   CN III IV VI: Extraocular movements are full without nystagmus. Pupils are equal, round, and reactive to light.   CN V: Sensation same on both sides of the face  CN VII: Facial movements are symmetric, no weakness.   CN XII: The tongue is midline. No atrophy or fasciculations.   Motor: Good strength 5/5 both UE and LE B/L  no pronator drift. No fasciculations, rigidity, spasticity or abnormal movements.   Sensation: Normal sensation to pin prick and light touch all four extremities   Station and gait: Normal gait   Coordination: Finger to nose test showed no dysmetria      Lab Results   Component Value Date    GLUCOSE 103 (H) 03/12/2025    BUN 17 03/12/2025    CREATININE 0.94 03/12/2025    EGFRIFNONA 98 11/05/2018    EGFRIFAFRI >60 08/27/2022    BCR 18.1 03/12/2025    CO2 21.6 (L) 03/12/2025    CALCIUM 9.4 03/12/2025    ALBUMIN 4.4 02/23/2025    LABIL2 1.6 08/27/2022    AST 17 02/23/2025    ALT 22 02/23/2025       Lab Results   Component Value Date    WBC 7.20 02/27/2025    HGB 14.6 02/27/2025    HCT 44.3 02/27/2025    MCV 85.9 02/27/2025     02/27/2025         .  Lab Results   Component Value Date    RPR Non Reactive 02/19/2021         Lab " Results   Component Value Date    TSH 0.775 02/23/2025         Lab Results   Component Value Date    DNSVIXQR12 568 01/28/2025         Lab Results   Component Value Date    FOLATE >20.00 02/13/2024         Lab Results   Component Value Date    HGBA1C 5.50 02/23/2025         Lab Results   Component Value Date    GLUCOSE 103 (H) 03/12/2025    BUN 17 03/12/2025    CREATININE 0.94 03/12/2025    EGFRIFNONA 98 11/05/2018    EGFRIFAFRI >60 08/27/2022    BCR 18.1 03/12/2025    K 4.2 03/12/2025    CO2 21.6 (L) 03/12/2025    CALCIUM 9.4 03/12/2025    ALBUMIN 4.4 02/23/2025    LABIL2 1.6 08/27/2022    AST 17 02/23/2025    ALT 22 02/23/2025         Lab Results   Component Value Date    WBC 7.20 02/27/2025    HGB 14.6 02/27/2025    HCT 44.3 02/27/2025    MCV 85.9 02/27/2025     02/27/2025       Results for orders placed during the hospital encounter of 02/23/25    CT Head Without Contrast    Narrative  CT OF THE BRAIN WITHOUT CONTRAST 02/23/2025    HISTORY: Hypertension. Vision changes.    Axial images were obtained through the brain without intravenous  contrast.    The brain parenchyma and ventricular system appear within normal limits.  No mass lesions, midline shift, intracranial hemorrhage or evidence of  infarction is demonstrated. There is some distal left vertebral artery  calcification. Bony structures appear unremarkable.    Impression  No acute process.    Radiation dose reduction techniques were utilized, including automated  exposure control and exposure modulation based on body size.      This report was finalized on 2/26/2025 10:51 AM by Dr. Salvador Arita M.D on Workstation: [id number]      Results for orders placed during the hospital encounter of 01/25/25    CT Angiogram Neck    Narrative  CT ANGIOGRAM NECK AND HEAD WITH CONTRAST    HISTORY: Syncope.    COMPARISON: CT head 01/25/2025 and CT angiogram neck and head  02/12/2024.    FINDINGS:initially, a noncontrasted CT examination of the head  was  performed. The brain ventricles are symmetrical. There is no evidence of  hemorrhage, hydrocephalus or of abnormal extra-axial fluid. Mild to  moderate vascular calcification is noted. No focal area of decreased  attenuation to suggest acute infarction is identified.    A CT angiogram of the neck and head was then performed. Multiplanar as  well as three-dimensional reconstructions were generated.    The great vessels are arranged in a classic configuration. There is 0%  stenosis of the internal carotid arteries using NASCET criteria. The  proximal aspects of the anterior and middle cerebral arteries appear  unremarkable. Both vertebral arteries were opacified. The right  vertebral artery is dominant. The left vertebral artery terminates or  effectively terminates as a posterior inferior cerebellar artery. The  basilar artery and the left posterior cerebral artery appear  unremarkable. A moderate size posterior communicating artery is present  on the right. The right P1 segment is hypoplastic.    Impression  There is no evidence of acute infarction or intracranial  hemorrhage. Further evaluation could be performed with MRI examination  of the brain. There is no evidence of carotid or proximal intracranial  arterial high-grade stenosis or occlusion. See above.        Radiation dose reduction techniques were utilized, including automated  exposure control and exposure modulation based on body size.      This report was finalized on 1/27/2025 1:45 PM by Dr. Alden Leigh M.D  on Workstation: [id number]      CT Angiogram Head    Narrative  CT ANGIOGRAM NECK AND HEAD WITH CONTRAST    HISTORY: Syncope.    COMPARISON: CT head 01/25/2025 and CT angiogram neck and head  02/12/2024.    FINDINGS:initially, a noncontrasted CT examination of the head was  performed. The brain ventricles are symmetrical. There is no evidence of  hemorrhage, hydrocephalus or of abnormal extra-axial fluid. Mild to  moderate vascular  calcification is noted. No focal area of decreased  attenuation to suggest acute infarction is identified.    A CT angiogram of the neck and head was then performed. Multiplanar as  well as three-dimensional reconstructions were generated.    The great vessels are arranged in a classic configuration. There is 0%  stenosis of the internal carotid arteries using NASCET criteria. The  proximal aspects of the anterior and middle cerebral arteries appear  unremarkable. Both vertebral arteries were opacified. The right  vertebral artery is dominant. The left vertebral artery terminates or  effectively terminates as a posterior inferior cerebellar artery. The  basilar artery and the left posterior cerebral artery appear  unremarkable. A moderate size posterior communicating artery is present  on the right. The right P1 segment is hypoplastic.    Impression  There is no evidence of acute infarction or intracranial  hemorrhage. Further evaluation could be performed with MRI examination  of the brain. There is no evidence of carotid or proximal intracranial  arterial high-grade stenosis or occlusion. See above.        Radiation dose reduction techniques were utilized, including automated  exposure control and exposure modulation based on body size.      This report was finalized on 1/27/2025 1:45 PM by Dr. Alden Leigh M.D  on Workstation: [id number]                       Imaging Reviewed       Reviewed multiple MRIs he had bilateral cerebellar strokes with some amount of cortical atrophy very mild small vessel disease no SWI change    CTA head and neck no evidence of large vessel occlusion stenosis or atherosclerosis    TTE 60% EF normal left atrium no clot no thrombus no PFO      Diagnosis   Pseudodementia  History of stroke  Recurrent syncopal episodeS  Atrial fibrillation not on anticoag  Uncontrolled severe  Unspecified mood disorder  History of depression    Chronic bilateral cerebellar stroke  History of alcohol  abuse      Patient has been seen and evaluated today has a history of bilateral cerebellar stroke secondary to atrial fibrillation he had a complete stroke workup done several times.  I believe his etiology of stroke is secondary to atrial fibrillation.    Today patient's main complaint is memory problems, he was clearly having panic attack in the clinic today he was also very emotional tearful.  I had a very long conversation with patient's son and patient to calm the patient down.  I do not think his chronic strokes are causing any disability.  Does have some atrophy secondary to probably heavy alcohol use in the past but given uncontrolled anxiety and severe depression I think this is most likely pseudodementia he needs to follow-up with psychiatrist closely and get his behavioral problems well-controlled before undergoing further memory assessment.          Plan:     Psychiatric follow-up for severe uncontrolled anxiety and depression  Check thiamine levels B12 TSH folate  Once his psychiatry symptoms are well-controlled he needs neuropsych testing out  Return to clinic in 2 months.       Diagnoses and all orders for this visit:    1. History of stroke (Primary)    2. Paroxysmal atrial fibrillation    3. Pseudodementia    4. Anxiety    5. Unspecified mood (affective) disorder    6. History of alcohol abuse          For history of TIA/stroke  Continue antiplatelet/AC as prescribed.  HLD: Goal LDL <70, should continue surveillance labs and management with PCP  HTN: goal of asymptomatic normotension. If elevated pt should reach out to PCP office, and if remains elevated at home go to urgent care and/or emergency room  DM: Continue monitoring of and control of blood sugars per PCP and/or endocrinology  Secondary stroke prevention: Ideal targets for stroke prevention would be Blood pressure < 130/80; LDL < 70; HbA1c < 6.5 and smoking cessation if applicable.  Call 911 for stroke symptoms      MDM   Reviewed: Previous  charts, nursing notes and vitals   Reviewed: Previous labs and CT CTA/MRI scan    Interpretation: Labs and CT/CTA/MRI scan   Total time providing care is :30-74 minutes. This excluded time spent performing separately reportable procedures and services  Consults :Neurology/Stroke    Please note that portions of this note were completed with a voice recognition program.     Trell Murphy MD  Neuro Hospitalist /Vascular Neurology.                       Dictated utilizing Dragon dictation.

## 2025-05-12 ENCOUNTER — OFFICE VISIT (OUTPATIENT)
Dept: SLEEP MEDICINE | Facility: HOSPITAL | Age: 60
End: 2025-05-12
Payer: COMMERCIAL

## 2025-05-12 VITALS
HEIGHT: 74 IN | BODY MASS INDEX: 28.23 KG/M2 | OXYGEN SATURATION: 94 % | HEART RATE: 63 BPM | WEIGHT: 220 LBS | DIASTOLIC BLOOD PRESSURE: 97 MMHG | SYSTOLIC BLOOD PRESSURE: 151 MMHG

## 2025-05-12 DIAGNOSIS — Z86.73 HISTORY OF STROKE: ICD-10-CM

## 2025-05-12 DIAGNOSIS — G47.9 SLEEP DISTURBANCE: ICD-10-CM

## 2025-05-12 DIAGNOSIS — G47.30 MILD SLEEP APNEA: Primary | ICD-10-CM

## 2025-05-12 DIAGNOSIS — G47.61 PLMD (PERIODIC LIMB MOVEMENT DISORDER): ICD-10-CM

## 2025-05-12 PROCEDURE — G0463 HOSPITAL OUTPT CLINIC VISIT: HCPCS

## 2025-05-12 NOTE — PROGRESS NOTES
[facility]  Telephone: [phone] / Fax: [phone] [place]  Telephone: [phone] / Fax: [phone] Tarah Domingo    PCP: Akash Rodriguez Jr., DO    Reason for visit: KRIS f/u    Flako Coreas Sr. is a 59 y.o.male  was last seen at [facility] sleep lab in  January 2024. He had embolic stroke in July 2023.  He had a sleep study in March 2024 that showed mild KRIS with AHI 9.9/hr and PLM index of 138. He lost his insurance and was never set up with treatment. He returns today in view of excessive fatigue and sleepiness. Since March 2024 he lost some weight. He continues to snore and has witnessed apneas. He  reports daytime sleepiness and fatigue.    SH- no tobacco, no alcohol, no caffeine.    ROS-+nasal congestion, +post nasal drip, +SOA, +anxiety, +depression, rest is negative.    Current Medications:    Current Outpatient Medications:     amLODIPine (NORVASC) 10 MG tablet, Take 1 tablet by mouth Daily., Disp: 90 tablet, Rfl: 3    atorvastatin (LIPITOR) 40 MG tablet, Take 1 tablet by mouth Every Night., Disp: 30 tablet, Rfl: 0    busPIRone (BUSPAR) 10 MG tablet, Take 1 tablet by mouth Every 8 (Eight) Hours., Disp: 90 tablet, Rfl: 0    carvedilol (COREG) 12.5 MG tablet, Take 1 tablet by mouth 2 (Two) Times a Day With Meals., Disp: 180 tablet, Rfl: 3    folic acid (FOLVITE) 1 MG tablet, Take 1 tablet by mouth Daily., Disp: 30 tablet, Rfl: 0    lisinopril (PRINIVIL,ZESTRIL) 40 MG tablet, Take 1 tablet by mouth Daily., Disp: 90 tablet, Rfl: 3    pantoprazole (PROTONIX) 40 MG EC tablet, Take 1 tablet by mouth Every Morning Before Breakfast., Disp: 30 tablet, Rfl: 0    thiamine (VITAMIN B1) 100 MG tablet, Take 1 tablet by mouth Daily., Disp: 30 tablet, Rfl: 0    vilazodone (Viibryd) 40 MG tablet tablet, Take 1 tablet by mouth Daily., Disp: 90 tablet, Rfl: 1    vitamin B-12 (CYANOCOBALAMIN) 500 MCG tablet, Take 1 tablet by mouth Daily., Disp: 90 tablet, Rfl: 0   also entered in Sleep  "Questionnaire    Patient  has a past medical history of ADHD (attention deficit hyperactivity disorder), Anxiety, Lepe's esophagus, Benign prostatic hyperplasia (Surgery in 12/2021), Brain concussion (11/2023), Clotting disorder (Intestinal), Depression, Diverticulitis, Diverticulosis, Duodenitis, Enteritis, Failure to thrive (child), Family history of blood clots, Fracture of wrist (1985), Fracture, foot (2019), GERD (gastroesophageal reflux disease), Hyperlipidemia, Hypertension, Hypertensive emergency, Implantable loop recorder present (03/12/2025), Irritable bowel syndrome (2017), Low testosterone, Microcytosis, Pancreatitis, Peptic ulcer disease (02/23/2025), Pneumonia, PONV (postoperative nausea and vomiting), Seasonal affective disorder, Shoulder injury, Stroke, Unexplained weight loss, and Visual impairment (8/2023).    I have reviewed the Past Medical History, Past Surgical History, Social History and Family History.    Vital Signs /97   Pulse 63   Ht 188 cm (74.02\")   Wt 99.8 kg (220 lb)   SpO2 94%   BMI 28.23 kg/m²  Body mass index is 28.23 kg/m².    General Alert and oriented. No acute distress noted   Pharynx/Throat Class IV  Mallampati airway, large tongue, no evidence of redundant lateral pharyngeal tissue. No oral lesions. No thrush. Moist mucous membranes.   Head Normocephalic. Symmetrical. Atraumatic.    Nose No septal deviation. No drainage   Chest Wall Normal shape. Symmetric expansion with respiration. No tenderness.   Neck Trachea midline, no thyromegaly or adenopathy    Lungs Clear to auscultation bilaterally. No wheezes. No rhonchi. No rales. Respirations regular, even and unlabored.   Heart Regular rhythm and normal rate. Normal S1 and S2. No murmur   Abdomen Soft, non-tender and non-distended. Normal bowel sounds. No masses.   Extremities Moves all extremities well. No edema   Psychiatric Normal mood and affect.     Testing:  Study-Results:  Overnight polysomnogram study from " 10:02 PM to 4:40 AM.  Sleep efficiency was poor at 59.6%.  Patient took his prescribed sleep aid at study onset.  Sleep distribution shows absence of REM sleep and minimal slow-wave sleep at 3.8%.  AHI index is 9.9 indicating mild obstructive sleep apnea.  Due to the absence of REM sleep and absence of supine sleep overall severity may be underestimated.  Snoring for 11% of sleep time.  Arousal index 28.  PLM index 138.  Oxygen saturation fell below 89% for 5.3 minutes.    Impression:  1. Mild sleep apnea    2. Sleep disturbance    3. History of stroke    4. PLMD (periodic limb movement disorder)          Plan:  I reviewed sleep study results from March 2024 with Flako in detail. I discussed KRIS pathophysiology and adverse outcomes associated with untreated sleep apnea. As his symptoms are unchanged, we decided to defer repeat testing for KRIS at this time, and initiate treatment with CPAP device based on March 2024 sleep study. Flako agrees with the plan. I reviewed original sleep study and recent download report with patient. I explained to him how CPAP works to treat sleep disorder breathing. We reviewed CPAP alternative today as well.    Note PLMD on PSG. Patient denies significant RLS at this time.    Thank you for allowing me to participate in your patient's care.      JANEEN Brown  [facility]  Phone: [phone]      Part of this note may be an electronic transcription/translation of spoken language to printed text using the Dragon Dictation System.

## 2025-05-13 ENCOUNTER — TELEPHONE (OUTPATIENT)
Dept: SLEEP MEDICINE | Facility: HOSPITAL | Age: 60
End: 2025-05-13
Payer: COMMERCIAL

## 2025-05-16 DIAGNOSIS — F39 MOOD DISORDER: Primary | ICD-10-CM

## 2025-05-19 DIAGNOSIS — F41.8 MIXED ANXIETY DEPRESSIVE DISORDER: ICD-10-CM

## 2025-05-19 RX ORDER — VILAZODONE HYDROCHLORIDE 40 MG/1
40 TABLET ORAL DAILY
Qty: 30 TABLET | Refills: 6 | Status: SHIPPED | OUTPATIENT
Start: 2025-05-19

## 2025-05-19 NOTE — TELEPHONE ENCOUNTER
Patient called in for refill on Viibryd - 30 days, insurance is too much for the 90 days. CPE scheduled 06/18     He also stated he needed lisinopril and amlodipine - informed cardio sent in year supply 03/2025 and informed he can request 30 day supply from pharmacy and not 90. He will contact pharmacy for this request.    Thanks,  Priti

## 2025-05-20 ENCOUNTER — TELEPHONE (OUTPATIENT)
Dept: CARDIOLOGY | Age: 60
End: 2025-05-20
Payer: COMMERCIAL

## 2025-05-20 NOTE — TELEPHONE ENCOUNTER
Flako Coreas Sr. Called to request a f/u.    Patient had an audible hitch in his voice.  Inquired if patient was experiencing any pain or shortness of breath.  Patient denied experiencing any pain and stated that he thinks he is having a panic attack.  Encouraged patient to take some slow, deep breaths.  He stated that he thought that helped.    Patient reports 3 episodes of pre-syncope since last night.  He reports dizziness occurs with position changes or if he is walking long distances or through a tight space.  Patient did not pass out.    This morning, patient reports elevated BP today:  /126 (when he first woke up)  /116 (after meds)  /113, HR 88 (time of call)    Cardiac Meds  Amlodipine 10 mg, daily  Carvedilol 12.5 mg, daily (med list says BID)  Lisinopril 40 mg, daily    Patient denies any headache or vision changes.  He denies any chest pain.  He states he has some trouble swallowing but thinks that's related to the panic attack.    Patient is waiting to schedule appointments with neuropsychology and psychiatry.  He states he does not want to go to the ED but feels he needs to be seen today if possible.    Please let me know how you would like to proceed.    Thank you,  Yamilet POND RN  Triage Nurse ANGELA  05/20/25  08:26 EDT

## 2025-05-20 NOTE — TELEPHONE ENCOUNTER
Good morning,   It looks like his LOOP recorder connected today and as of early this morning there were no new events recorded. At 01:37am this morning his presenting strip shows SR in the 60s bpm. If he is around his home monitor it should transmit any further episodes if they occurred automatically. Please let me know if we can help in any other way.   Kindly,   Meg Schotanus Device RN

## 2025-05-20 NOTE — TELEPHONE ENCOUNTER
Agree with getting his loop recorder interrogated.  I am not sure I will be able to offer him much in our cardiology office if the loop recorder looks good.  I also do not think that blood pressure recordings will be that accurate or helpful if someone is feeling acutely anxious, as the anxiety state will inflate those results.    I would recommend the carvedilol dosing to be taken twice per day, this can help blunt some of this fight or flight hormone responses that can occur in the settings of anxiousness.    I am not sure that there would be much that I would offer him in a clinic appointment outside of referring to an emergency room given the symptoms he is experiencing, I am not sure they would be related to the heart if his loop recorder looks good.    If he is looking for more urgent referrals to psychiatry, he would need to talk to his primary care office about that, I am just not sure of those resources that would be available to him.

## 2025-05-20 NOTE — TELEPHONE ENCOUNTER
Reviewed loop recorder report and Dr. Taylor' message and recommendations with Flako Coreas Sr..  Patient verbalized understanding with repeat back of medication instructions.    Reviewed neuropsych referral information.  Provided patient with neuropsych office number:    Encouraged patient to call this office to help expedite scheduling.  He stated he will do this.      Reviewed psychiatry referral information.  Chart review shows the following:      Patient states he is waiting for [facility] to call him back.  Explained that per chart review [facility] placed call to patient and left a voicemail for him.  Encouraged patient to call back and try to get in with this provider rather than waiting for [facility] if possible based on his insurance coverage.  Patient verbalized understanding and stated he will call back.    Thank you,  Yamilet POND RN  Triage Nurse ANGELA  05/20/25 09:20 EDT

## 2025-05-21 ENCOUNTER — TELEPHONE (OUTPATIENT)
Age: 60
End: 2025-05-21
Payer: COMMERCIAL

## 2025-05-21 ENCOUNTER — TELEPHONE (OUTPATIENT)
Dept: PSYCHIATRY | Facility: CLINIC | Age: 60
End: 2025-05-21
Payer: COMMERCIAL

## 2025-05-21 ENCOUNTER — APPOINTMENT (OUTPATIENT)
Dept: GENERAL RADIOLOGY | Facility: HOSPITAL | Age: 60
End: 2025-05-21
Payer: COMMERCIAL

## 2025-05-21 ENCOUNTER — HOSPITAL ENCOUNTER (EMERGENCY)
Facility: HOSPITAL | Age: 60
Discharge: HOME | End: 2025-05-21
Attending: EMERGENCY MEDICINE | Admitting: EMERGENCY MEDICINE
Payer: COMMERCIAL

## 2025-05-21 ENCOUNTER — TELEPHONE (OUTPATIENT)
Dept: SPORTS MEDICINE | Facility: CLINIC | Age: 60
End: 2025-05-21

## 2025-05-21 VITALS
SYSTOLIC BLOOD PRESSURE: 155 MMHG | DIASTOLIC BLOOD PRESSURE: 90 MMHG | WEIGHT: 220.02 LBS | RESPIRATION RATE: 20 BRPM | TEMPERATURE: 97.9 F | HEART RATE: 72 BPM | BODY MASS INDEX: 28.24 KG/M2 | OXYGEN SATURATION: 94 % | HEIGHT: 74 IN

## 2025-05-21 DIAGNOSIS — F41.9 ANXIETY: Primary | ICD-10-CM

## 2025-05-21 DIAGNOSIS — R06.00 DYSPNEA, UNSPECIFIED TYPE: ICD-10-CM

## 2025-05-21 DIAGNOSIS — F41.0 PANIC ATTACK: ICD-10-CM

## 2025-05-21 LAB
ALBUMIN SERPL-MCNC: 4.5 G/DL (ref 3.5–5.2)
ALBUMIN/GLOB SERPL: 1.5 G/DL
ALP SERPL-CCNC: 79 U/L (ref 39–117)
ALT SERPL W P-5'-P-CCNC: 23 U/L (ref 1–41)
AMPHET+METHAMPHET UR QL: NEGATIVE
AMPHETAMINES UR QL: NEGATIVE
ANION GAP SERPL CALCULATED.3IONS-SCNC: 12.4 MMOL/L (ref 5–15)
AST SERPL-CCNC: 19 U/L (ref 1–40)
BARBITURATES UR QL SCN: NEGATIVE
BASOPHILS # BLD AUTO: 0.06 10*3/MM3 (ref 0–0.2)
BASOPHILS NFR BLD AUTO: 0.5 % (ref 0–1.5)
BENZODIAZ UR QL SCN: NEGATIVE
BILIRUB SERPL-MCNC: 0.5 MG/DL (ref 0–1.2)
BUN SERPL-MCNC: 25 MG/DL (ref 6–20)
BUN/CREAT SERPL: 25 (ref 7–25)
BUPRENORPHINE SERPL-MCNC: NEGATIVE NG/ML
CALCIUM SPEC-SCNC: 9.7 MG/DL (ref 8.6–10.5)
CANNABINOIDS SERPL QL: POSITIVE
CHLORIDE SERPL-SCNC: 103 MMOL/L (ref 98–107)
CO2 SERPL-SCNC: 21.6 MMOL/L (ref 22–29)
COCAINE UR QL: NEGATIVE
CREAT SERPL-MCNC: 1 MG/DL (ref 0.76–1.27)
DEPRECATED RDW RBC AUTO: 41.4 FL (ref 37–54)
EGFRCR SERPLBLD CKD-EPI 2021: 87.8 ML/MIN/1.73
EOSINOPHIL # BLD AUTO: 0.22 10*3/MM3 (ref 0–0.4)
EOSINOPHIL NFR BLD AUTO: 1.8 % (ref 0.3–6.2)
ERYTHROCYTE [DISTWIDTH] IN BLOOD BY AUTOMATED COUNT: 12.8 % (ref 12.3–15.4)
ETHANOL BLD-MCNC: <10 MG/DL (ref 0–10)
ETHANOL UR QL: <0.01 %
FENTANYL UR-MCNC: NEGATIVE NG/ML
GLOBULIN UR ELPH-MCNC: 3.1 GM/DL
GLUCOSE SERPL-MCNC: 103 MG/DL (ref 65–99)
HCT VFR BLD AUTO: 52.7 % (ref 37.5–51)
HGB BLD-MCNC: 17.2 G/DL (ref 13–17.7)
IMM GRANULOCYTES # BLD AUTO: 0.03 10*3/MM3 (ref 0–0.05)
IMM GRANULOCYTES NFR BLD AUTO: 0.2 % (ref 0–0.5)
LYMPHOCYTES # BLD AUTO: 1.81 10*3/MM3 (ref 0.7–3.1)
LYMPHOCYTES NFR BLD AUTO: 14.6 % (ref 19.6–45.3)
MCH RBC QN AUTO: 28.8 PG (ref 26.6–33)
MCHC RBC AUTO-ENTMCNC: 32.6 G/DL (ref 31.5–35.7)
MCV RBC AUTO: 88.3 FL (ref 79–97)
METHADONE UR QL SCN: NEGATIVE
MONOCYTES # BLD AUTO: 0.98 10*3/MM3 (ref 0.1–0.9)
MONOCYTES NFR BLD AUTO: 7.9 % (ref 5–12)
NEUTROPHILS NFR BLD AUTO: 75 % (ref 42.7–76)
NEUTROPHILS NFR BLD AUTO: 9.31 10*3/MM3 (ref 1.7–7)
NRBC BLD AUTO-RTO: 0 /100 WBC (ref 0–0.2)
NT-PROBNP SERPL-MCNC: 220 PG/ML (ref 0–900)
OPIATES UR QL: NEGATIVE
OXYCODONE UR QL SCN: NEGATIVE
PCP UR QL SCN: NEGATIVE
PLATELET # BLD AUTO: 234 10*3/MM3 (ref 140–450)
PMV BLD AUTO: 10.4 FL (ref 6–12)
POTASSIUM SERPL-SCNC: 4 MMOL/L (ref 3.5–5.2)
PROT SERPL-MCNC: 7.6 G/DL (ref 6–8.5)
QT INTERVAL: 398 MS
QTC INTERVAL: 463 MS
RBC # BLD AUTO: 5.97 10*6/MM3 (ref 4.14–5.8)
SODIUM SERPL-SCNC: 137 MMOL/L (ref 136–145)
TRICYCLICS UR QL SCN: NEGATIVE
TROPONIN T SERPL HS-MCNC: 8 NG/L
WBC NRBC COR # BLD AUTO: 12.41 10*3/MM3 (ref 3.4–10.8)

## 2025-05-21 PROCEDURE — 85007 BL SMEAR W/DIFF WBC COUNT: CPT | Performed by: PHYSICIAN ASSISTANT

## 2025-05-21 PROCEDURE — 36415 COLL VENOUS BLD VENIPUNCTURE: CPT | Performed by: PHYSICIAN ASSISTANT

## 2025-05-21 PROCEDURE — 84484 ASSAY OF TROPONIN QUANT: CPT | Performed by: PHYSICIAN ASSISTANT

## 2025-05-21 PROCEDURE — 90791 PSYCH DIAGNOSTIC EVALUATION: CPT

## 2025-05-21 PROCEDURE — 80053 COMPREHEN METABOLIC PANEL: CPT | Performed by: PHYSICIAN ASSISTANT

## 2025-05-21 PROCEDURE — 85025 COMPLETE CBC W/AUTO DIFF WBC: CPT | Performed by: PHYSICIAN ASSISTANT

## 2025-05-21 PROCEDURE — 80307 DRUG TEST PRSMV CHEM ANLYZR: CPT | Performed by: PHYSICIAN ASSISTANT

## 2025-05-21 PROCEDURE — 99284 EMERGENCY DEPT VISIT MOD MDM: CPT

## 2025-05-21 PROCEDURE — 83880 ASSAY OF NATRIURETIC PEPTIDE: CPT | Performed by: PHYSICIAN ASSISTANT

## 2025-05-21 PROCEDURE — 93010 ELECTROCARDIOGRAM REPORT: CPT | Performed by: INTERNAL MEDICINE

## 2025-05-21 PROCEDURE — 82077 ASSAY SPEC XCP UR&BREATH IA: CPT | Performed by: PHYSICIAN ASSISTANT

## 2025-05-21 PROCEDURE — 93005 ELECTROCARDIOGRAM TRACING: CPT

## 2025-05-21 PROCEDURE — 71045 X-RAY EXAM CHEST 1 VIEW: CPT

## 2025-05-21 PROCEDURE — 93005 ELECTROCARDIOGRAM TRACING: CPT | Performed by: EMERGENCY MEDICINE

## 2025-05-21 RX ORDER — LORAZEPAM 1 MG/1
1 TABLET ORAL ONCE
Status: COMPLETED | OUTPATIENT
Start: 2025-05-21 | End: 2025-05-21

## 2025-05-21 RX ORDER — HYDROXYZINE HYDROCHLORIDE 25 MG/1
25-50 TABLET, FILM COATED ORAL EVERY 6 HOURS PRN
Qty: 30 TABLET | Refills: 0 | Status: SHIPPED | OUTPATIENT
Start: 2025-05-21 | End: 2025-05-27

## 2025-05-21 RX ADMIN — LORAZEPAM 1 MG: 1 TABLET ORAL at 19:29

## 2025-05-21 NOTE — TELEPHONE ENCOUNTER
PT CALLED BACK AGAIN ASKING TO SPEAK TO [facility] (WHO SCHEDULED HIS APT). [facility] WAS UNAVAILABLE. PT STATED THAT HE JUST WANTED TO GO TO THE HOSPITAL, BUT COULDN'T GET ANYONE TO TAKE HIM. I ASKED PT IF HE WOULD LIKE ME TO HELP HIM ACQUIRE TRANSPORTATION TO WHICH PT SAID NO. PT STATED HE HEARD [facility] AND THAT THE AMBULANCE WAS COMING. I WAS ABLE TO HEAR THE [facility] AND ASKED PT TO STAY ON THE PHONE UNTIL THEY GOT THERE WITH THE PT. ASKED PT TO GIVE PHONE TO SOMEONE TO CONFIRM THAT THEY WERE THERE AND HELPING HIM. PARAMEDIC EASTON WITH [facility] GOT ON THE PHONE AND CONFIRMED WITH ME THAT THEY HAD HIM UNDER THEIR CARE.

## 2025-05-21 NOTE — TELEPHONE ENCOUNTER
Patient called back stating needed to see doctor, could not make earlier appointment.  Please call [phone]

## 2025-05-21 NOTE — ED PROVIDER NOTES
EMERGENCY DEPARTMENT ENCOUNTER  Room Number:  04/04  PCP: Akash Rodriguez Jr., DO  Independent Historians: Patient      HPI:  Chief Complaint: had concerns including Anxiety.     A complete HPI/ROS/PMH/PSH/SH/FH are unobtainable due to: None    Chronic or social conditions impacting patient care (Social Determinants of Health): None      Context: The patient is a 59 y.o. male with a medical history of anxiety and depression, alcohol abuse, mood disorder, CVA who presents to the ED via EMS c/o acute severe anxiety.  He states he has had severe anxiety for a long time.  He quit drinking alcohol 2 months ago and it has not improved.  He has tried multiple previous antidepressants without adequate relief of his symptoms.  He is scheduled to see his psychiatrist for the first time in July but after symptoms escalated today he saw his PCP and they were able to get his appointment moved up to next Monday, 5 days from now.  He went home feeling better after his appointment but then gradually start to get more anxious again ended up calling 911 and coming to the ER for his symptoms.  He commonly feels short of breath with his anxiety, denies any chest pain  His most bothersome symptom is feeling like he has trouble organizing his thoughts and actions.  He denies any thoughts of harming himself or others.  He has a son and a brother who he is close to.       Review of prior external notes (non-ED) -and- Review of prior external test results outside of this encounter:  Multiple phone calls between patient his cardiologist and his primary care office in the last couple days.  Cardiology did evaluate his loop recorder this morning and there were no events.    Office visit with neurology on 4/30/2025, patient was having a panic attack during his visit, was emotional and tearful.  Psychiatry follow-up recommended.    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    Anxiety about health 02/26/2024    Vertebral artery stenosis, left 02/26/2024    Memory loss 02/26/2024    History of alcohol dependence 02/26/2024    Elevated lipoprotein(a) 04/03/2024    Elevated coronary artery calcium score 04/03/2024    PAF (paroxysmal atrial fibrillation) 07/03/2024    Alcohol use 01/25/2025    Mood disorder 01/25/2025    Alcohol withdrawal 01/27/2025    History of CVA (cerebrovascular accident) 02/23/2025    Peptic ulcer disease 02/23/2025    History of diverticulosis 02/23/2025    Implantable loop recorder present 03/12/2025     Resolved Ambulatory Problems     Diagnosis Date Noted    Abdominal pain 06/20/2018    Generalized abdominal pain 12/08/2022    History of esophagitis 12/08/2022    Gastrointestinal hemorrhage 12/08/2022    Acute CVA (cerebrovascular accident) 08/17/2023    Hypertensive emergency 08/19/2023    Ataxia 08/19/2023    CVA (cerebral vascular accident) 08/22/2023    Acute CVA (cerebrovascular accident) 10/23/2023    HTN (hypertension) 10/24/2023    TIA (transient ischemic attack) 10/26/2023    Concussion 10/27/2023    Fall 10/27/2023    Head injury, closed, with concussion 10/27/2023    CHI (closed head injury), initial encounter 12/10/2023    Recurrent syncope 01/19/2024    Closed head injury 02/12/2024    Hospital discharge follow-up 02/26/2024    Dizziness 02/26/2024    Vasovagal syncope 03/15/2024    ALLISON (acute kidney injury) 03/15/2024    Syncope  01/25/2025    Hypertensive emergency 01/25/2025    History of atrial fibrillation 01/25/2025    Hypertensive urgency 02/23/2025    Hyperglycemia 02/23/2025     Past Medical History:   Diagnosis Date    ADHD (attention deficit hyperactivity disorder)     Anxiety     Benign prostatic hyperplasia Surgery in 12/2021    Brain concussion 11/2023    Clotting disorder Intestinal    Depression     Diverticulitis     Duodenitis     Enteritis     Failure to thrive (child)     Family history of blood clots     Fracture of wrist 1985    Fracture, foot 2019    Hypertension     Irritable bowel syndrome 2017    Low testosterone     Microcytosis     Pancreatitis     Pneumonia     PONV (postoperative nausea and vomiting)     Seasonal affective disorder     Shoulder injury     Stroke     Unexplained weight loss     Visual impairment 8/2023         PAST SURGICAL HISTORY  Past Surgical History:   Procedure Laterality Date    ABDOMINAL SURGERY      CARDIAC ELECTROPHYSIOLOGY PROCEDURE N/A 01/24/2024    Procedure: Loop insertion- [employer] LINQ;  Surgeon: Kaela Walker MD;  Location: [facility] CATH INVASIVE LOCATION;  Service: Cardiovascular;  Laterality: N/A;    COLON SURGERY      COLONOSCOPY      COLONOSCOPY N/A 12/11/2022    Procedure: COLONOSCOPY INTO CECUM WITH HOT SNARE POLYPECTOMY;  Surgeon: Albert Gallo MD;  Location: [facility] ENDOSCOPY;  Service: Gastroenterology;  Laterality: N/A;  PRE- GI BLEED  POST- DIVERTICULOSIS, POLYP    COLONOSCOPY N/A 02/14/2024    Procedure: COLONOSCOPY into cecum/terminal ileum;  Surgeon: Albert Gallo MD;  Location: [facility] ENDOSCOPY;  Service: Gastroenterology;  Laterality: N/A;  diverticulosis, hemorrhoids    ENDOSCOPY N/A 12/10/2022    Procedure: ESOPHAGOGASTRODUODENOSCOPY;  Surgeon: Albert Gallo MD;  Location: [facility] ENDOSCOPY;  Service: Gastroenterology;  Laterality: N/A;  PRE- DARK STOOLS  POST- BARRETTS ESOPHAGUS    ENDOSCOPY N/A 02/14/2024    Procedure: ESOPHAGOGASTRODUODENOSCOPY with  biopsy;  Surgeon: Albert Gallo MD;  Location: [facility] ENDOSCOPY;  Service: Gastroenterology;  Laterality: N/A;  long segment wilson's, hiatal hernia    FRACTURE SURGERY Right 1980    for broken arm    FRACTURE SURGERY Right     for broken hand    GASTRECTOMY      HAND SURGERY  1990    INCISION AND DRAINAGE ABSCESS  2015    anal    PROSTATE SURGERY      SHOULDER SURGERY  2022    SKIN BIOPSY      SMALL INTESTINE SURGERY      TONSILLECTOMY      TRIGGER POINT INJECTION  2017         FAMILY HISTORY  Family History   Problem Relation Age of Onset    Stroke Mother     Heart disease Mother     Stroke Father     Hyperlipidemia Father     Hypertension Father          SOCIAL HISTORY  Social History     Socioeconomic History    Marital status: Single   Tobacco Use    Smoking status: Never     Passive exposure: Never    Smokeless tobacco: Never   Vaping Use    Vaping status: Never Used   Substance and Sexual Activity    Alcohol use: Not Currently    Drug use: Not Currently     Frequency: 1.0 times per week    Sexual activity: Defer     Partners: Female     Birth control/protection: Other, Birth control pill         ALLERGIES  Patient has no known allergies.      REVIEW OF SYSTEMS  Review of Systems  Included in HPI  All systems reviewed and negative except for those discussed in HPI.      PHYSICAL EXAM    I have reviewed the triage vital signs and nursing notes.    ED Triage Vitals [05/21/25 1459]   Temp Heart Rate Resp BP SpO2   97.9 °F (36.6 °C) 94 20 (!) 165/107 96 %      Temp src Heart Rate Source Patient Position BP Location FiO2 (%)   Oral Monitor -- -- --       Physical Exam  GENERAL: alert, tearful, anxious  SKIN: Warm, dry  HENT: Normocephalic, atraumatic  EYES: no scleral icterus  CV: regular rhythm, regular rate extremity edema or calf tenderness, negative Homans' sign bilaterally  RESPIRATORY: normal effort, lungs clear  ABDOMEN: nondistended  MUSCULOSKELETAL: no deformity  NEURO: alert, moves all extremities,  follows commands            LAB RESULTS  Recent Results (from the past 24 hours)   ECG 12 Lead Dyspnea    Collection Time: 05/21/25  3:10 PM   Result Value Ref Range    QT Interval 398 ms    QTC Interval 463 ms   CBC Auto Differential    Collection Time: 05/21/25  7:12 PM    Specimen: Blood   Result Value Ref Range    WBC 12.41 (H) 3.40 - 10.80 10*3/mm3    RBC 5.97 (H) 4.14 - 5.80 10*6/mm3    Hemoglobin 17.2 13.0 - 17.7 g/dL    Hematocrit 52.7 (H) 37.5 - 51.0 %    MCV 88.3 79.0 - 97.0 fL    MCH 28.8 26.6 - 33.0 pg    MCHC 32.6 31.5 - 35.7 g/dL    RDW 12.8 12.3 - 15.4 %    RDW-SD 41.4 37.0 - 54.0 fl    MPV 10.4 6.0 - 12.0 fL    Platelets 234 140 - 450 10*3/mm3    Neutrophil % 75.0 42.7 - 76.0 %    Lymphocyte % 14.6 (L) 19.6 - 45.3 %    Monocyte % 7.9 5.0 - 12.0 %    Eosinophil % 1.8 0.3 - 6.2 %    Basophil % 0.5 0.0 - 1.5 %    Immature Grans % 0.2 0.0 - 0.5 %    Neutrophils, Absolute 9.31 (H) 1.70 - 7.00 10*3/mm3    Lymphocytes, Absolute 1.81 0.70 - 3.10 10*3/mm3    Monocytes, Absolute 0.98 (H) 0.10 - 0.90 10*3/mm3    Eosinophils, Absolute 0.22 0.00 - 0.40 10*3/mm3    Basophils, Absolute 0.06 0.00 - 0.20 10*3/mm3    Immature Grans, Absolute 0.03 0.00 - 0.05 10*3/mm3    nRBC 0.0 0.0 - 0.2 /100 WBC   Comprehensive Metabolic Panel    Collection Time: 05/21/25  8:57 PM    Specimen: Arm, Left; Blood   Result Value Ref Range    Glucose 103 (H) 65 - 99 mg/dL    BUN 25 (H) 6 - 20 mg/dL    Creatinine 1.00 0.76 - 1.27 mg/dL    Sodium 137 136 - 145 mmol/L    Potassium 4.0 3.5 - 5.2 mmol/L    Chloride 103 98 - 107 mmol/L    CO2 21.6 (L) 22.0 - 29.0 mmol/L    Calcium 9.7 8.6 - 10.5 mg/dL    Total Protein 7.6 6.0 - 8.5 g/dL    Albumin 4.5 3.5 - 5.2 g/dL    ALT (SGPT) 23 1 - 41 U/L    AST (SGOT) 19 1 - 40 U/L    Alkaline Phosphatase 79 39 - 117 U/L    Total Bilirubin 0.5 0.0 - 1.2 mg/dL    Globulin 3.1 gm/dL    A/G Ratio 1.5 g/dL    BUN/Creatinine Ratio 25.0 7.0 - 25.0    Anion Gap 12.4 5.0 - 15.0 mmol/L    eGFR 87.8 >60.0  mL/min/1.73   High Sensitivity Troponin T    Collection Time: 05/21/25  8:57 PM    Specimen: Arm, Left; Blood   Result Value Ref Range    HS Troponin T 8 <22 ng/L   BNP    Collection Time: 05/21/25  8:57 PM    Specimen: Arm, Left; Blood   Result Value Ref Range    proBNP 220.0 0.0 - 900.0 pg/mL   Ethanol    Collection Time: 05/21/25  8:57 PM    Specimen: Arm, Left; Blood   Result Value Ref Range    Ethanol <10 0 - 10 mg/dL    Ethanol % <0.010 %   Urine Drug Screen - Urine, Clean Catch    Collection Time: 05/21/25  8:58 PM    Specimen: Urine, Clean Catch   Result Value Ref Range    THC, Screen, Urine Positive (A) Negative    Phencyclidine (PCP), Urine Negative Negative    Cocaine Screen, Urine Negative Negative    Methamphetamine, Ur Negative Negative    Opiate Screen Negative Negative    Amphetamine Screen, Urine Negative Negative    Benzodiazepine Screen, Urine Negative Negative    Tricyclic Antidepressants Screen Negative Negative    Methadone Screen, Urine Negative Negative    Barbiturates Screen, Urine Negative Negative    Oxycodone Screen, Urine Negative Negative    Buprenorphine, Screen, Urine Negative Negative   Fentanyl, Urine - Urine, Clean Catch    Collection Time: 05/21/25  8:58 PM    Specimen: Urine, Clean Catch   Result Value Ref Range    Fentanyl, Urine Negative Negative         RADIOLOGY  XR Chest 1 View  Result Date: 5/21/2025  Portable chest radiograph  HISTORY: Shortness of breath  TECHNIQUE: Single AP portable radiograph of the chest  COMPARISON: Chest radiograph 2/23/2020      FINDINGS AND IMPRESSION: Presumed loop recorder overlies the left chest. No pulmonary consolidation, pleural effusion or pneumothorax is seen cardiac silhouette within normal limits for size.  This report was finalized on 5/21/2025 9:04 PM by Dr. Jameson Hoffman M.D on Workstation: [id number]          MEDICATIONS GIVEN IN ER  Medications   LORazepam (ATIVAN) tablet 1 mg (1 mg Oral Given 5/21/25 1929)         ORDERS PLACED  DURING THIS VISIT:  Orders Placed This Encounter   Procedures    XR Chest 1 View    Comprehensive Metabolic Panel    High Sensitivity Troponin T    BNP    Ethanol    Urine Drug Screen - Urine, Clean Catch    CBC Auto Differential    Scan Slide    Fentanyl, Urine - Urine, Clean Catch    Monitor Blood Pressure    Continuous Pulse Oximetry    Psych / Access Center    ECG 12 Lead Dyspnea    CBC & Differential         OUTPATIENT MEDICATION MANAGEMENT:  No current Epic-ordered facility-administered medications on file.     Current Outpatient Medications Ordered in Epic   Medication Sig Dispense Refill    amLODIPine (NORVASC) 10 MG tablet Take 1 tablet by mouth Daily. 90 tablet 3    atorvastatin (LIPITOR) 40 MG tablet Take 1 tablet by mouth Every Night. 30 tablet 0    busPIRone (BUSPAR) 10 MG tablet Take 1 tablet by mouth Every 8 (Eight) Hours. 90 tablet 0    carvedilol (COREG) 12.5 MG tablet Take 1 tablet by mouth 2 (Two) Times a Day With Meals. 180 tablet 3    folic acid (FOLVITE) 1 MG tablet Take 1 tablet by mouth Daily. 30 tablet 0    hydrOXYzine (ATARAX) 25 MG tablet Take 1-2 tablets by mouth Every 6 (Six) Hours As Needed for Anxiety. 30 tablet 0    lisinopril (PRINIVIL,ZESTRIL) 40 MG tablet Take 1 tablet by mouth Daily. 90 tablet 3    pantoprazole (PROTONIX) 40 MG EC tablet Take 1 tablet by mouth Every Morning Before Breakfast. 30 tablet 0    thiamine (VITAMIN B1) 100 MG tablet Take 1 tablet by mouth Daily. 30 tablet 0    vilazodone (Viibryd) 40 MG tablet tablet Take 1 tablet by mouth Daily. 30 tablet 6    vitamin B-12 (CYANOCOBALAMIN) 500 MCG tablet Take 1 tablet by mouth Daily. 90 tablet 0         PROCEDURES  Procedures            PROGRESS, DATA ANALYSIS, CONSULTS, AND MEDICAL DECISION MAKING  All labs have been independently interpreted by me.  All radiology studies have been reviewed by me. All EKG's have been independently viewed and interpreted by me.  Discussion below represents my analysis of pertinent  findings related to patient's condition, differential diagnosis, treatment plan and final disposition.    DIFFERENTIAL    My differential diagnosis includes but is not limited to panic attack, anxiety, CHF, asthma, ACS    Clinical Scores:                  ED Course as of 05/21/25 2332   Wed May 21, 2025   1852 Patient is extremely anxious and uncomfortable [KA]   1924 XR Chest 1 View  My independent interpretation of the imaging study is no lobar infiltrate or pneumothorax [AB]   1924 EKG interpreted by myself  Time: 1510  Rate: 82  Rhythm: NSR  No ST elevation or depression  Normal intervals  Normal morphology [AB]   2040 WBC(!): 12.41 [KA]   2040 Hemoglobin: 17.2 [KA]   2040 My independent Interpretation of the chest x-ray is no cardiomegaly or acute infiltrate. [KA]   2042 G, he took an ambulance and tonight..  He is expressed a lot of concern about his blood pressure, he has been in contact with cardiac I recommended cardiac workup here in the ER to evaluate him  medically.  If unremarkable that would be very reassuring and I think he would benefit from behavioral health evaluation, he is agreeable with this plan [KA]   2125 THC Screen, Urine(!): Positive [KA]   2224 I discussed the patient with Yam[facility] behavioral health RN.  She has evaluated the patient at the bedside.  Recommends intensive outpatient program and will try to place the patient [KA]   2329 Behavioral health has arranged for the patient to go to the [facility] tomorrow for intensive outpatient intake assessment.  I will prescribe him some hydroxyzine to take as needed for panic and anxiety.  Return precautions discussed.  He is agreeable with this plan and ready for discharge [KA]      ED Course User Index  [AB] Kamaljit Dexter MD  [KA] Kat Dailey PA-C             AS OF 23:32 EDT VITALS:    BP - (!) 165/107  HR - 94  TEMP - 97.9 °F (36.6 °C) (Oral)  O2 SATS - 96%    COMPLEXITY OF CARE  Admission was considered but after careful review of  the patient's presentation, physical examination, diagnostic results, and response to treatment the patient may be safely discharged with outpatient follow-up.      DIAGNOSIS  Final diagnoses:   Anxiety   Panic attack   Dyspnea, unspecified type         DISPOSITION  ED Disposition       ED Disposition   Discharge    Condition   Stable    Comment   --                  FOLLOW UP  Akash Rodriguez Jr., DO  [address]  [address]  [facility] [id number] [phone]          [facility] EMERGENCY DEPARTMENT  [address]  [facility] [address] [phone]    If symptoms worsen or any concerns        Prescribed Medications     Medication List        New Prescriptions      hydrOXYzine 25 MG tablet  Commonly known as: ATARAX  Take 1-2 tablets by mouth Every 6 (Six) Hours As Needed for Anxiety.               Where to Get Your Medications        These medications were sent to [facility] [address] [place] [address] ([place]), [place] - [address] [facility] - [phone] [facility] [phone] [address] ([place]) [place] [address]      Phone: [phone]   hydrOXYzine 25 MG tablet                   Please note that portions of this document were completed with a voice recognition program.    Note Disclaimer: At [facility], we believe that sharing information builds trust and better relationships. You are receiving this note because you recently visited [facility]. It is possible you will see health information before a provider has talked with you about it. This kind of information can be easy to misunderstand. To help you fully understand what it means for your health, we urge you to discuss this note with your provider.         Kat Dailey PA-C  05/21/25 4763

## 2025-05-21 NOTE — TELEPHONE ENCOUNTER
Patient came in stating experiencing stroke symptoms, advised him to go to ER, he declined, stated experienced these symptoms everyday.  Spoke with Dr. Rodriguez, going to work him in at 11:40am.    Patient called [facility] Scheduling Dept. [phone] on 5/20/25 and made appointment for 7/1/25 with Psychiatry, but stated that was too far out.    I called  back per Dr. Rodriguez request to see if sooner appt and was able to get one on 6/17, and he has been placed on a wait list for any sooner cancellations.

## 2025-05-21 NOTE — LETTER
[facility]  [phone]    ACCESS CENTER STATEMENT OF DISPOSITION      I, Flako Coreas Sr., [facility] on 5/21/2025.  I understand the recommendations below and what follow-up action is expected of me.      Please see outpatient psychiatric Anna VERNON on 5/27/2025      Intensive Outpatient Program (IOP)  *please go to [facility] anytime tonight or tomorrow for an assessment  [facility] (IOP Monday to Friday, 9am to 12pm)   [address], [place] [id number] [phone]     [facility] (IOP Monday to Friday, 9am to 12pm, back up options)  [address] [place] [address] [phone]         [facility]   [address] [place] [address] [phone]       ________________________________  Patient/Parent/Guardian/POA Signature    ________________________________  Clinician Signature    5/21/2025  22:29 EDT

## 2025-05-21 NOTE — TELEPHONE ENCOUNTER
"[facility]- PT HAD CALLED STATING HE DID NOT FEEL HE COULD WAIT UNTIL HIS APPOINTMENT WITH KRISTINA. PT STATED HE MAY NEED TO GO TO THE HOSPITAL. PT STATED HE WAS NOT \"CRAZY\" AND DID NOT WANT TO HARM HIMSELF. COWORKER ENRIQUETA LINN GOT ON PHONE WITH PT.   "

## 2025-05-21 NOTE — TELEPHONE ENCOUNTER
TOOK OVER CALL FROM CO-WORKER BRODY SALMERON. IT WAS TO MY UNDERSTANDING PT WAS CALLING STATING HE CANNOT WAIT UNTIL HIS UPCOMING APPT WITH KRISTINA JOHNSON AT [facility]. PT WAS UPSET AND STATED HE WAS SAD AND FELT LIKE HE NEEDED TO GO TO THE HOSPITAL. PT STATED HE DID NOT HAVE SI OR HI. I ASKED PT IF ANYONE WAS WITH HIM, PT SAID NO. I ASKED PT IF WE COULD CALL AUTHORITIES AND PT STATED THAT HE WOULD CONTACT HIS BROTHER TO TAKE HIM TO THE HOSPITAL. PT STATED HE WOULD CALL BACK ONCE HE WAS IN TOUCH WITH HIS BROTHER. PT LATER CALLED BACK STILL UPSET STATING THAT HE CALLED 911. PT STATES 911 JUST WANTED TO TALK. I OFFERED TO CALL AUTHORITIES FOR PT AND PT HUNG UP. CALLED PT BACK X2, UNABLE TO REACH.

## 2025-05-21 NOTE — TELEPHONE ENCOUNTER
Patient had called, we had missed it so I gave him a call back. He was just calling to say he was in the ER waiting room and wanted to thank us for helping.

## 2025-05-21 NOTE — ED PROVIDER NOTES
MD ATTESTATION NOTE  I supervised care provided by the APC. We have discussed this patient's history, physical exam, and treatment plan. I have reviewed the APC's note and I agree with the APC's findings and plan of care.   SHARED VISIT: This visit was performed by BOTH a physician and an APC. The substantive portion of the medical decision making was performed by this attesting physician who made or approved the management plan and takes responsibility for patient management. All studies in the APC note (if performed) were independently interpreted by me.   I have personally had a face to face encounter with the patient.     PCP: Akash Rodriguez Jr., DO  Patient Care Team:  Akash Rodriguez Jr., DO as PCP - General (Sports Medicine)  Chinmay Taylor MD as Cardiologist (Cardiology)  Pan Leon MD as Consulting Physician (Neurology)     Flako Coreas Sr. is a 59 y.o. male who presents to the ED c/o anxiety.  Patient states that he has had worsening anxiety and depression for some period of time.  He takes medication for this and is scheduled to see a psychiatrist.  He reports intermittent palpitations but states he is had a loop recorder that has had normal findings.  Denies any direct inciting incidents to cause worsening anxiety.  He denies chest pain or shortness of breath.  No SI, HI or AVH.    On exam:  General: NAD.  Head: NCAT.  ENT: nares patent, no scleral icterus  Neck: Supple, trachea midline.  Cardiac: regular rate and rhythm.  Lungs: normal effort, clear to auscultation bilaterally  Abdomen: Soft, nondistended, NTTP, no rebound tenderness, no guarding or rigidity.   Extremities: Moves all extremities well, no peripheral edema  Neuro: alert, MAEW, follows commands  Psych: calm, cooperative  Skin: Warm, dry.    Medical Decision Making:  After the initial H&P, I discussed pertinent information from history and physical exam with patient/family.  Discussed differential  diagnosis.  Discussed plan for ED evaluation/work-up/treatment.  All questions answered.  Patient/family is agreeable with plan.    ED Course as of 05/22/25 1905   Wed May 21, 2025   1852 Patient is extremely anxious and uncomfortable [KA]   1924 XR Chest 1 View  My independent interpretation of the imaging study is no lobar infiltrate or pneumothorax [AB]   1924 EKG interpreted by myself  Time: 1510  Rate: 82  Rhythm: NSR  No ST elevation or depression  Normal intervals  Normal morphology [AB]   2040 WBC(!): 12.41 [KA]   2040 Hemoglobin: 17.2 [KA]   2040 My independent Interpretation of the chest x-ray is no cardiomegaly or acute infiltrate. [KA]   2042 G, he took an ambulance and tonight..  He is expressed a lot of concern about his blood pressure, he has been in contact with cardiac I recommended cardiac workup here in the ER to evaluate him  medically.  If unremarkable that would be very reassuring and I think he would benefit from behavioral health evaluation, he is agreeable with this plan [KA]   2125 THC Screen, Urine(!): Positive [KA]   2224 I discussed the patient with Yam[facility]t behavioral health RN.  She has evaluated the patient at the bedside.  Recommends intensive outpatient program and will try to place the patient [KA]   2329 Behavioral health has arranged for the patient to go to the [facility] tomorrow for intensive outpatient intake assessment.  I will prescribe him some hydroxyzine to take as needed for panic and anxiety.  Return precautions discussed.  He is agreeable with this plan and ready for discharge [KA]      ED Course User Index  [AB] Kamaljit Dexter MD  [KA] Kat Dailey PA-C       Diagnosis  Final diagnoses:   Anxiety   Panic attack   Dyspnea, unspecified type        Kamaljit Dexter MD  05/22/25 1906

## 2025-05-22 NOTE — CONSULTS
"DATA: [facility] consult due to anxiety; this writer reviewed chart, spoke with ED provider and RN, and met with pt individually in ED room 4.  Patient presents to [facility] ED via EMS complaining of \"severe anxiety attack\"; he notes difficulty breathing and other anxiety symptoms since stroke a few years ago.  Patient has been seen by the [facility] previously; last assessment on 2/25/2025.     Pt is a 59-year-old  (in 2006)  male who lives in an apartment by himself; he is affiliated with the [employer].  Patient states he has one 26-year-old son; he describes relationships as \"very good\".  Patient completed a bachelor's degree; he is currently on disability but would like to get a part-time job once anxiety becomes more manageable.  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 Pt enjoys fishing, chess, making wooden models, watching YouTube, and going on walks; support system includes his son, brother, and friends. Pt acknowledges a history of aggression \"a long time ago\", trauma includes his dad's death status post CVA in 2016 and various personal health issues since that time; he reports feeling safe and states no one is threatening and/or harming him.    ASSESSMENT: Pt is alert and oriented x 4, mood is anxious with congruent affect, speech can be hesitant at times, thought content is fairly organized and relevant, motor activity is slightly tense and/or restless. Pt denies current auditory, visual, and/or tactile hallucinations and he does not appear to be responding to internal stimuli at present; however, he describes waking up with \"bugs on (him) this morning\" and thinking he saw another EMS person with the ambulance. Upon exploring these symptoms, pt is unable to describe what the bugs looked like other than \"brown spots\"; he states no " "one else saw them but he talked with his brother about this and other concerns. Pt denies these instances were hallucinations; he reports feeling like his \"memory is overloaded\". Pt also espouses \"ringing in (his) ears since the stroke\"; no auditory and/or command hallucinations noted. Sleep is described as \"not good\"- he is hoping to obtain a CPAP next week, appetite is also \"not good\" for the last 2 weeks or so.  Patient denies current suicidal thoughts, plans, and/or intentions; he states he has not wished to be dead over the last month.  Patient acknowledges a history of suicidal ideation and suicide attempt in 2017 secondary to \"diverticulitis... and terrible pain\"; this attempt included placing a loaded gun in his mouth but not being able to physically pull the trigger.  Patient states he discussed this with his doctor at the time; no other known medical care/treatment noted.  Patient is very future oriented regarding his son and \"being here for the duration\" of (his) life; he denies current homicidal thoughts, plans, and/or intention.  This writer listened and provided support/empathy as patient processed psychosocial stressors including financial stress, health, bugs in his apartment, etc.    Depression and anxiety are currently rated 10 out of 10 (with 10 being the worst); behavioral health diagnoses include \"depression and anxiety\" and ADHD.  Patient reports anxiety and depressive symptoms have gotten progressively worse since his stroke in August 2023; symptomology includes difficulty focusing, tearfulness, \"can't think right\", can't remember right\" (i.e. the ABCs), difficulty maintaining a schedule, feeling isolated, difficulty maintaining conversations, panic, etc.  Mental health treatment history includes upcoming outpatient psychiatry appointment with JANEEN Mahoney on 5/27/2025, previous outpatient counseling but none currently, no inpatient psychiatric hospitalizations noted; pt is currently " "taking/prescribed the following psychiatric medications: Viibryd 40 mg daily.  Patient states he ran out of BuSpar 10 mg 1 tablet every 8 hours about a week ago; however, he notes being okay with this as he didn't feel like it helped anyway.  Patient given Ativan 1 mg (oral) in the ED; he reports he \"couldn't tell a difference\" and states he does not want to \"get addicted to anything\", however, Valium has helped the 2 times he has taken it.  ED provider reports she intends to prescribe Vistaril for as needed anxiety symptoms; patient aware and in agreement.     Substance use history includes alcohol and THC use; ETOH screen is negative, UDS is positive for THC.  Patient denies tobacco/nicotine use; he reports he quit drinking alcohol 2 months ago and intends to stay sober.  Patient reports using THC on and off for years; last use last week Tuesday, Wednesday, or Thursday.  Patient states he smokes 1 THC joint 2 or 3 times a week; he reports it helps him to \"calm down\" and/or go to sleep.  This writer provided psychoeducation and caution regarding THC use; patient denies AODA treatment history and declines [facility] treatment resources at this time.    PLAN: Pt does not meet criteria for inpatient psychiatric hospitalization; he states he is able to keep himself safe at home and within the community.  Discussed various levels of care and encouraged psychiatric IOP; patient is agreeable.  Per [place], [facility] has immediate in-person psych IOP openings; pt plans to go to [facility] tomorrow for assessment- he requests that this writer fax [facility] behavioral health evaluation to their facility to expedite IOP assessment.  This writer provided outpatient psychiatry appointment reminder and list of local psychiatric hospital/IOP resources; patient plans to return home, utilize ED provider prescribed Vistaril for anxiety symptoms, focus on establishing/keeping a routine, and trying to \"stay positive\". Discussed " disposition with ED provider who is aware/agrees with plan; no further needs and/or concerns noted at this time per patient and/or medical team.  [facility] to sign off; please reconsult if additional behavioral health needs and/or concerns arise.

## 2025-05-27 ENCOUNTER — TELEMEDICINE (OUTPATIENT)
Dept: PSYCHIATRY | Facility: CLINIC | Age: 60
End: 2025-05-27
Payer: COMMERCIAL

## 2025-05-27 ENCOUNTER — PRIOR AUTHORIZATION (OUTPATIENT)
Dept: PSYCHIATRY | Facility: CLINIC | Age: 60
End: 2025-05-27

## 2025-05-27 DIAGNOSIS — F33.2 SEVERE EPISODE OF RECURRENT MAJOR DEPRESSIVE DISORDER, WITHOUT PSYCHOTIC FEATURES: Primary | ICD-10-CM

## 2025-05-27 DIAGNOSIS — F41.0 PANIC ANXIETY SYNDROME: ICD-10-CM

## 2025-05-27 DIAGNOSIS — Z79.899 MEDICATION MANAGEMENT: ICD-10-CM

## 2025-05-27 DIAGNOSIS — G47.9 SLEEP DISTURBANCES: ICD-10-CM

## 2025-05-27 PROCEDURE — 90792 PSYCH DIAG EVAL W/MED SRVCS: CPT

## 2025-05-27 RX ORDER — ASPIRIN 81 MG/1
81 TABLET, CHEWABLE ORAL DAILY
COMMUNITY

## 2025-05-27 RX ORDER — CLONIDINE HYDROCHLORIDE 0.1 MG/1
0.1 TABLET ORAL DAILY PRN
Qty: 30 TABLET | Refills: 0 | Status: SHIPPED | OUTPATIENT
Start: 2025-05-27

## 2025-05-27 NOTE — PROGRESS NOTES
This provider is located at the [facility] (through [facility]), [address], [facility], [address] using a secure MyChart Video Visit through [employer]. Patient is being seen remotely via telehealth at their home address in [place], and stated they are in a secure environment for this session. The patient's condition being diagnosed/treated is appropriate for telemedicine. The provider identified herself as well as her credentials.   The patient, and/or patients guardian, consent to be seen remotely, and when consent is given they understand that the consent allows for patient identifiable information to be sent to a third party as needed.   They may refuse to be seen remotely at any time. The electronic data is encrypted and password protected, and the patient and/or guardian has been advised of the potential risks to privacy not withstanding such measures.    You have chosen to receive care through a telehealth visit.  Do you consent to use a video/audio connection for your medical care today? Yes        Patient identifiers utilized: Name and date of birth.    Patient verbally confirmed consent for today's encounter:  May 27, 2025    Sea Coreas Sr. is a 59 y.o. male who presents today for initial evaluation     Chief Complaint:    Chief Complaint   Patient presents with    Anxiety    Depression    Med Management        Referring Provider: Trell Murphy MD    History of Present Illness:    History of Present Illness  Is a 59-year-old male presenting for initial psychiatric consultation, referred for a possible mood disorder.  Patient states mental health history began in 2008, previous diagnoses include depression and anxiety.  Patient does indicate conditions have been complicated since stroke suffered in 2023.  Has experienced memory loss as well which he initially thought was due to the stroke however patient states neurology has said he is  "recovering well and believes his current symptoms are depression and anxiety related.  He believes Viibryd 40 mg has been beneficial \"I think it has helped, if I quit I can tell I go downhill a lot faster\" in regards to depression.  Some difficulty with leaving his house currently, also cites previous driving complications \"pulling out in front of a car twice, almost fainted while driving.\"  Denies both passive and active SI currently and is convincing, states \"I could never do it because of my son.\"  Did have a circumstance with a \"plan to shoot myself with a gun\" in 2017.  States he did have a gun but this was self interrupted and he did not go through with this plan.  Indicates stress and everything was too much at that time.  Also states \"they have had to remove my intestines from stress and everything.\"  PHQ score of 24 indicates severe depression as patient was a 10/10 on average.  He denies HI, SIB, or hallucinations currently and is convincing.  States \"I have solved people that worked there with panic attacks\" previously.  RIRI score 18 indicates severe anxiety which patient also rates a 10/10 on average as well as hopelessness.  Utilizes Atarax 25 to 50 mg as needed for anxiety, somewhat beneficial with panic episodes.  States he is \"worried of addiction, I turn into the devil with opioids\" and has tried to avoid such medications.  States \"one of the biggest problems I have is I cannot make decisions, I do not leave the house.\"  Sleep \"either 4 to 5 hours or 15 hours, I am not consistent with it.\"  Is awaiting Pap therapy, diagnosed with mild KRIS.  States he is no longer taking BuSpar, does not feel was very therapeutic.  Regarding appetite \"I eat too little, lazy do not feel like cooking.\"  Believes he has lost approximately 10 pounds over the past month.  Medical comorbidities listed below.  Currently on Social Security due to medical status but is hopeful to get back to work, not pursuing disability " "permanently at this time.    Patient is , has 1 son living age 26.  Lives alone.  Mother unfortunately passed away in  due to COVID.  He was her caregiver for approximately 3 years and acknowledges this as difficult.  Father passed away from a stroke in 2017.  Strained relationship with brother he is currently working through, 1 sister he does not have communication with.  Carmen of education is a bachelor's in criminal justice and a business minor.  Currently unemployed but previously worked as a  for [employer], [employer], and [employer].  States \"I want to get back to work and just concern I am not ready.\"  Congregational Zoroastrian preference.  Has had 1 arrest due to an  tag and missing traffic school.  Regarding chemical dependency and substance abuse, patient states \"I have had bouts with alcohol, enjoying it too much, the same with marijuana.\"  Not currently drinking or smoking.  Denies alternative circumstances of abuse or dependency.  Not currently in therapy but does express interest.  Enjoys the outdoors, modeling, and walking.         The following portions of the patient's history were reviewed and updated as appropriate: allergies, current medications, past family history, past medical history, past social history, past surgical history and problem list.    Past Psychiatric History:  Began Treatment:   Diagnoses:Depression and Anxiety  Psychiatrist: previously  Therapist: previously  Admission History:Denies  Medication Trials: buspar 10 TID (\"I think it helped a little but I don't know made me tired\"), remeron 30 (don't remember), elavil 25 (\"seemed to work very well but worried about addiction\"), klonopin .0125, lexapro (sexual side effects but seemed to work) , atarax 25-50 (worked)  Self Harm: Denies  Suicide Attempts: self interrupted via shooting   Psychosis, Anxiety, Depression:  N/A    Past Medical History:  Past Medical History:   Diagnosis Date    " ADHD (attention deficit hyperactivity disorder)     On going issue    Anxiety     Lepe's esophagus     Benign prostatic hyperplasia Surgery in 12/2021    Brain concussion 11/2023    Clotting disorder Intestinal    Depression     Diverticulitis     Diverticulosis     Duodenitis     Enteritis     Failure to thrive (child)     Family history of blood clots     Fracture of wrist 1985    Fracture, foot 2019    GERD (gastroesophageal reflux disease)     Hyperlipidemia     Hypertension     Hypertensive emergency     Implantable loop recorder present 03/12/2025    Irritable bowel syndrome 2017    Low testosterone     Microcytosis     Pancreatitis     Peptic ulcer disease 02/23/2025    Pneumonia     PONV (postoperative nausea and vomiting)     Seasonal affective disorder     Shoulder injury     Stroke     Unexplained weight loss     Visual impairment 8/2023       Substance Abuse History:   Types:Denies all, including illicit  Withdrawal Symptoms:Denies  Longest Period Sober:Not Applicable   AA: Not applicable     Social History:  Social History     Socioeconomic History    Marital status:    Tobacco Use    Smoking status: Never     Passive exposure: Never    Smokeless tobacco: Never   Vaping Use    Vaping status: Never Used   Substance and Sexual Activity    Alcohol use: Not Currently    Drug use: Not Currently     Frequency: 1.0 times per week    Sexual activity: Defer     Partners: Female     Birth control/protection: Other, Birth control pill       Family History:  Family History   Problem Relation Age of Onset    Stroke Mother     Heart disease Mother     Stroke Father     Hyperlipidemia Father     Hypertension Father        Past Surgical History:  Past Surgical History:   Procedure Laterality Date    ABDOMINAL SURGERY      CARDIAC ELECTROPHYSIOLOGY PROCEDURE N/A 01/24/2024    Procedure: Loop insertion- Medtronic LINQ;  Surgeon: Kaela Walker MD;  Location: [facility] INVASIVE LOCATION;  Service:  Cardiovascular;  Laterality: N/A;    COLON SURGERY      COLONOSCOPY      COLONOSCOPY N/A 12/11/2022    Procedure: COLONOSCOPY INTO CECUM WITH HOT SNARE POLYPECTOMY;  Surgeon: Albert Gallo MD;  Location:  [facility];  Service: Gastroenterology;  Laterality: N/A;  PRE- GI BLEED  POST- DIVERTICULOSIS, POLYP    COLONOSCOPY N/A 02/14/2024    Procedure: COLONOSCOPY into cecum/terminal ileum;  Surgeon: Albert Gallo MD;  [facility];  Service: Gastroenterology;  Laterality: N/A;  diverticulosis, hemorrhoids    ENDOSCOPY N/A 12/10/2022    Procedure: ESOPHAGOGASTRODUODENOSCOPY;  Surgeon: Albert Gallo MD;  Location: [facility] ENDOSCOPY;  Service: Gastroenterology;  Laterality: N/A;  PRE- DARK STOOLS  POST- BARRETTS ESOPHAGUS    ENDOSCOPY N/A 02/14/2024    Procedure: ESOPHAGOGASTRODUODENOSCOPY with biopsy;  Surgeon: Albert Gallo MD;  Location: [facility] ENDOSCOPY;  Service: Gastroenterology;  Laterality: N/A;  long segment wilson's, hiatal hernia    FRACTURE SURGERY Right 1980    for broken arm    FRACTURE SURGERY Right     for broken hand    GASTRECTOMY      HAND SURGERY  1990    INCISION AND DRAINAGE ABSCESS  2015    anal    PROSTATE SURGERY      SHOULDER SURGERY  2022    SKIN BIOPSY      SMALL INTESTINE SURGERY      TONSILLECTOMY      TRIGGER POINT INJECTION  2017       Problem List:  Patient Active Problem List   Diagnosis    Mixed anxiety depressive disorder    Hyperlipidemia    Essential hypertension    Diverticulosis    Nodule of spleen    Chronic bilateral lower abdominal pain    Wilson's esophagus    GERD (gastroesophageal reflux disease)    Vertigo    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    History of vertebral artery stenosis    Alcohol abuse    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    Anxiety about health    Vertebral artery stenosis, left    Memory loss    History of alcohol dependence    Elevated lipoprotein(a)    Elevated coronary artery calcium  "score    PAF (paroxysmal atrial fibrillation)    Alcohol use    Mood disorder    Alcohol withdrawal    History of CVA (cerebrovascular accident)    Peptic ulcer disease    History of diverticulosis    Implantable loop recorder present       Allergy:   No Known Allergies     Current Medications:   Current Outpatient Medications   Medication Sig Dispense Refill    amLODIPine (NORVASC) 10 MG tablet Take 1 tablet by mouth Daily. 90 tablet 3    aspirin 81 MG chewable tablet Chew 1 tablet Daily.      atorvastatin (LIPITOR) 40 MG tablet Take 1 tablet by mouth Every Night. 30 tablet 0    carvedilol (COREG) 12.5 MG tablet Take 1 tablet by mouth 2 (Two) Times a Day With Meals. 180 tablet 3    folic acid (FOLVITE) 1 MG tablet Take 1 tablet by mouth Daily. 30 tablet 0    lisinopril (PRINIVIL,ZESTRIL) 40 MG tablet Take 1 tablet by mouth Daily. 90 tablet 3    pantoprazole (PROTONIX) 40 MG EC tablet Take 1 tablet by mouth Every Morning Before Breakfast. 30 tablet 0    thiamine (VITAMIN B1) 100 MG tablet Take 1 tablet by mouth Daily. 30 tablet 0    vilazodone (Viibryd) 40 MG tablet tablet Take 1 tablet by mouth Daily. 30 tablet 6    vitamin B-12 (CYANOCOBALAMIN) 500 MCG tablet Take 1 tablet by mouth Daily. 90 tablet 0    busPIRone (BUSPAR) 10 MG tablet Take 1 tablet by mouth Every 8 (Eight) Hours. (Patient not taking: Reported on 5/27/2025) 90 tablet 0    Cariprazine HCl (Vraylar) 1.5 MG capsule capsule Take 1 capsule by mouth Daily. 30 capsule 0    cloNIDine (CATAPRES) 0.1 MG tablet Take 1 tablet by mouth Daily As Needed (anxiety). 30 tablet 0     No current facility-administered medications for this visit.       Review of Systems:    Review of Systems   Constitutional:  Positive for appetite change and unexpected weight loss.   HENT: Negative.     Eyes: Negative.    Respiratory: Negative.     Cardiovascular: Negative.         \"Heart pause 14 seconds last year\"   Gastrointestinal:  Positive for GERD.   Endocrine: Negative.  "   Genitourinary: Negative.    Musculoskeletal: Negative.    Skin: Negative.    Allergic/Immunologic: Negative.    Neurological: Negative.  Negative for seizures.   Hematological: Negative.    Psychiatric/Behavioral:  Positive for dysphoric mood, sleep disturbance, depressed mood and stress. The patient is nervous/anxious.          Physical Exam:   Physical Exam  Constitutional:       Appearance: Normal appearance.   HENT:      Head: Normocephalic.      Nose: Nose normal.   Musculoskeletal:         General: Normal range of motion.      Cervical back: Normal range of motion.   Neurological:      Mental Status: He is alert.   Psychiatric:         Attention and Perception: Attention and perception normal.         Mood and Affect: Mood is anxious and depressed. Affect is flat.         Speech: Speech normal.         Behavior: Behavior normal. Behavior is cooperative.         Thought Content: Thought content normal.         Cognition and Memory: Cognition and memory normal.         Judgment: Judgment normal.         Vitals:  There were no vitals taken for this visit. There is no height or weight on file to calculate BMI.  Due to extenuating circumstances and possible current health risks associated with the patient being present in a clinical setting (with current health restrictions in place in regards to possible COVID 19 transmission/exposure), the patient was seen remotely today via a [employer] Video Visit through [employer].  Unable to obtain vital signs due to nature of remote visit.  Height stated at 6ft2 inches.  Weight stated at 215 pounds.    Last 3 Blood Pressure Readings:  BP Readings from Last 3 Encounters:   05/21/25 155/90   05/12/25 151/97   03/20/25 142/94       PHQ-9 Score:   PHQ-9 Total Score:  PHQ-9 Depression Screening  Little interest or pleasure in doing things? Nearly every day   Feeling down, depressed, or hopeless? Nearly every day   PHQ-2 Total Score 6   Trouble falling or staying asleep, or sleeping too  much? Nearly every day   Feeling tired or having little energy? Nearly every day   Poor appetite or overeating? Nearly every day   Feeling bad about yourself - or that you are a failure or have let yourself or your family down? Nearly every day   Trouble concentrating on things, such as reading the newspaper or watching television? Nearly every day   Moving or speaking so slowly that other people could have noticed? Or the opposite - being so fidgety or restless that you have been moving around a lot more than usual? Nearly every day     Thoughts that you would be better off dead, or of hurting yourself in some way? Not at all   PHQ-9 Total Score 24   If you checked off any problems, how difficult have these problems made it for you to do your work, take care of things at home, or get along with other people? Extremely difficult           RIRI-7 Score:   Feeling nervous, anxious or on edge: (Patient-Rptd) Nearly every day  Not being able to stop or control worrying: (Patient-Rptd) Nearly every day  Worrying too much about different things: (Patient-Rptd) Nearly every day  Trouble Relaxing: (Patient-Rptd) Nearly every day  Being so restless that it is hard to sit still: (Patient-Rptd) Not at all  Feeling afraid as if something awful might happen: (Patient-Rptd) Nearly every day  Becoming easily annoyed or irritable: (Patient-Rptd) Nearly every day  RIRI 7 Total Score: (Patient-Rptd) 18  If you checked any problems, how difficult have these problems made it for you to do your work, take care of things at home, or get along with other people: (Patient-Rptd) Extremely difficult     Mental Status Exam:   Hygiene:   good  Cooperation:  Guarded  Eye Contact:  Poor  Psychomotor Behavior:  Restless  Affect:  Blunted  Mood: sad and anxious  Hopelessness: 10  Speech:  Normal  Thought Process:  Goal directed and Linear  Thought Content:  Normal and Mood congruent  Suicidal:  None  Homicidal:  None  Hallucinations:  Not  demonstrated today  Delusion:  None  Memory:  Deficits  Orientation:  Grossly intact  Reliability:  fair  Insight:  Fair  Judgement:  Fair  Impulse Control:  Fair  Physical/Medical Issues:   stroke 2023, hypertension, syncope, memory loss, mild KRIS 2024.         Lab Results:   Admission on 05/21/2025, Discharged on 05/21/2025   Component Date Value Ref Range Status    QT Interval 05/21/2025 398  ms Final    QTC Interval 05/21/2025 463  ms Final    Glucose 05/21/2025 103 (H)  65 - 99 mg/dL Final    BUN 05/21/2025 25 (H)  6 - 20 mg/dL Final    Creatinine 05/21/2025 1.00  0.76 - 1.27 mg/dL Final    Sodium 05/21/2025 137  136 - 145 mmol/L Final    Potassium 05/21/2025 4.0  3.5 - 5.2 mmol/L Final    Chloride 05/21/2025 103  98 - 107 mmol/L Final    CO2 05/21/2025 21.6 (L)  22.0 - 29.0 mmol/L Final    Calcium 05/21/2025 9.7  8.6 - 10.5 mg/dL Final    Total Protein 05/21/2025 7.6  6.0 - 8.5 g/dL Final    Albumin 05/21/2025 4.5  3.5 - 5.2 g/dL Final    ALT (SGPT) 05/21/2025 23  1 - 41 U/L Final    AST (SGOT) 05/21/2025 19  1 - 40 U/L Final    Alkaline Phosphatase 05/21/2025 79  39 - 117 U/L Final    Total Bilirubin 05/21/2025 0.5  0.0 - 1.2 mg/dL Final    Globulin 05/21/2025 3.1  gm/dL Final    A/G Ratio 05/21/2025 1.5  g/dL Final    BUN/Creatinine Ratio 05/21/2025 25.0  7.0 - 25.0 Final    Anion Gap 05/21/2025 12.4  5.0 - 15.0 mmol/L Final    eGFR 05/21/2025 87.8  >60.0 mL/min/1.73 Final    HS Troponin T 05/21/2025 8  <22 ng/L Final    proBNP 05/21/2025 220.0  0.0 - 900.0 pg/mL Final    Ethanol 05/21/2025 <10  0 - 10 mg/dL Final    Ethanol % 05/21/2025 <0.010  % Final    THC, Screen, Urine 05/21/2025 Positive (A)  Negative Final    Phencyclidine (PCP), Urine 05/21/2025 Negative  Negative Final    Cocaine Screen, Urine 05/21/2025 Negative  Negative Final    Methamphetamine, Ur 05/21/2025 Negative  Negative Final    Opiate Screen 05/21/2025 Negative  Negative Final    Amphetamine Screen, Urine 05/21/2025 Negative  Negative  Final    Benzodiazepine Screen, Urine 05/21/2025 Negative  Negative Final    Tricyclic Antidepressants Screen 05/21/2025 Negative  Negative Final    Methadone Screen, Urine 05/21/2025 Negative  Negative Final    Barbiturates Screen, Urine 05/21/2025 Negative  Negative Final    Oxycodone Screen, Urine 05/21/2025 Negative  Negative Final    Buprenorphine, Screen, Urine 05/21/2025 Negative  Negative Final    WBC 05/21/2025 12.41 (H)  3.40 - 10.80 10*3/mm3 Final    RBC 05/21/2025 5.97 (H)  4.14 - 5.80 10*6/mm3 Final    Hemoglobin 05/21/2025 17.2  13.0 - 17.7 g/dL Final    Hematocrit 05/21/2025 52.7 (H)  37.5 - 51.0 % Final    MCV 05/21/2025 88.3  79.0 - 97.0 fL Final    MCH 05/21/2025 28.8  26.6 - 33.0 pg Final    MCHC 05/21/2025 32.6  31.5 - 35.7 g/dL Final    RDW 05/21/2025 12.8  12.3 - 15.4 % Final    RDW-SD 05/21/2025 41.4  37.0 - 54.0 fl Final    MPV 05/21/2025 10.4  6.0 - 12.0 fL Final    Platelets 05/21/2025 234  140 - 450 10*3/mm3 Final    Neutrophil % 05/21/2025 75.0  42.7 - 76.0 % Final    Lymphocyte % 05/21/2025 14.6 (L)  19.6 - 45.3 % Final    Monocyte % 05/21/2025 7.9  5.0 - 12.0 % Final    Eosinophil % 05/21/2025 1.8  0.3 - 6.2 % Final    Basophil % 05/21/2025 0.5  0.0 - 1.5 % Final    Immature Grans % 05/21/2025 0.2  0.0 - 0.5 % Final    Neutrophils, Absolute 05/21/2025 9.31 (H)  1.70 - 7.00 10*3/mm3 Final    Lymphocytes, Absolute 05/21/2025 1.81  0.70 - 3.10 10*3/mm3 Final    Monocytes, Absolute 05/21/2025 0.98 (H)  0.10 - 0.90 10*3/mm3 Final    Eosinophils, Absolute 05/21/2025 0.22  0.00 - 0.40 10*3/mm3 Final    Basophils, Absolute 05/21/2025 0.06  0.00 - 0.20 10*3/mm3 Final    Immature Grans, Absolute 05/21/2025 0.03  0.00 - 0.05 10*3/mm3 Final    nRBC 05/21/2025 0.0  0.0 - 0.2 /100 WBC Final    Fentanyl, Urine 05/21/2025 Negative  Negative Final   Hospital Outpatient Visit on 03/12/2025   Component Date Value Ref Range Status    Glucose 03/12/2025 103 (H)  65 - 99 mg/dL Final    BUN 03/12/2025 17  6 -  20 mg/dL Final    Creatinine 03/12/2025 0.94  0.76 - 1.27 mg/dL Final    Sodium 03/12/2025 137  136 - 145 mmol/L Final    Potassium 03/12/2025 4.2  3.5 - 5.2 mmol/L Final    Chloride 03/12/2025 104  98 - 107 mmol/L Final    CO2 03/12/2025 21.6 (L)  22.0 - 29.0 mmol/L Final    Calcium 03/12/2025 9.4  8.6 - 10.5 mg/dL Final    BUN/Creatinine Ratio 03/12/2025 18.1  7.0 - 25.0 Final    Anion Gap 03/12/2025 11.4  5.0 - 15.0 mmol/L Final    eGFR 03/12/2025 94.6  >60.0 mL/min/1.73 Final   Admission on 02/23/2025, Discharged on 02/27/2025   Component Date Value Ref Range Status    HS Troponin T 02/23/2025 <6  <22 ng/L Final    HS Troponin T 02/23/2025 <6  <22 ng/L Final    Troponin T Numeric Delta 02/23/2025    Final    Unable to calculate.    TSH 02/23/2025 0.775  0.270 - 4.200 uIU/mL Final    VMA, Urine 02/24/2025 10.6  Undefined mg/L Final    VMA 24 Hour 02/24/2025 8.0 (H)  0.0 - 7.5 mg/24 hr Final    Epinephrine, Urine 02/24/2025 23  Undefined ug/L Final    Epinephrine, 24H Ur 02/24/2025 17  0 - 20 ug/24 hr Final    Norepinephrine, Urine 02/24/2025 131  Undefined ug/L Final    Norepinephrine, 24H Ur 02/24/2025 98  0 - 135 ug/24 hr Final    Dopamine, Urine 02/24/2025 307  Undefined ug/L Final    Dopamine , 24H Ur 02/24/2025 230  0 - 510 ug/24 hr Final    5-HIAA, Urine 02/24/2025 7.2  Undefined mg/L Final    5-HIAA, 24H Ur 02/24/2025 5.4  0.0 - 14.9 mg/24 hr Final    Aldosterone 02/24/2025 1.8  0.0 - 30.0 ng/dL Final    KID L RIGHT 02/24/2025 12.2  cm Final    KID W RIGHT 02/24/2025 6.4  cm Final    Kid L 02/24/2025 11.3  cm Final    Left kidney width 02/24/2025 4.3  cm Final    RENAL A ORG PSV RIGHT 02/24/2025 72.3  cm/s Final    RENAL A ORG EDV RIGHT 02/24/2025 17.2  cm/s Final    PROX DEMOND A PSV RIGHT 02/24/2025 61.4  cm/s Final    PROX DEMOND A EDV RIGHT 02/24/2025 26.3  cm/s Final    MID DEMOND A PSV RIGHT 02/24/2025 50.2  cm/s Final    MID DEMOND A EDV RIGHT 02/24/2025 19.0  cm/s Final    DIST DEMOND A PSV RIGHT 02/24/2025 36.5   cm/s Final    DIST DEMOND A EDV RIGHT 02/24/2025 14.1  cm/s Final    Hilum Right PSV 02/24/2025 33.6  cm/s Final    Hilum Right EDV 02/24/2025 15.6  cm/s Final    RENAL A ORG PSV LEFT 02/24/2025 47.5  cm/s Final    RENAL A ORG EDV LEFT 02/24/2025 8.7  cm/s Final    PROX DEMOND A PSV LEFT 02/24/2025 32.0  cm/s Final    PROX DEMOND A EDV LEFT 02/24/2025 12.4  cm/s Final    MID DEMOND A PSV LEFT 02/24/2025 34.3  cm/s Final    MID DEMOND A EDV LEFT 02/24/2025 13.9  cm/s Final    DIST DEMOND A PSV LEFT 02/24/2025 36.3  cm/s Final    DIST DEMOND A EDV LEFT 02/24/2025 14.1  cm/s Final    Hilum Left PSV 02/24/2025 44.1  cm/s Final    Hilum Left EDV 02/24/2025 15.3  cm/s Final    Right renal upper parenchyma max 02/24/2025 33.2  cm/s Final    Right renal upper parenchyma min 02/24/2025 15.7  cm/s Final    Aortic Mid PSV 02/24/2025 105.0  cm/s Final    RAR RIGHT 02/24/2025 0.58   Final    Left renal upper parenchyma max 02/24/2025 39.7  cm/s Final    Left renal upper parenchyma min 02/24/2025 15.3  cm/s Final    RAR LEFT 02/24/2025 0.35   Final    Left renal upper parenchyma RI 02/24/2025 0.61   Final    Right renal upper parenchyma RI 02/24/2025 0.53   Final    BH CV VAS BP RIGHT ARM 02/24/2025 132/86  mmHg Final    BH CV VAS BP LEFT ARM 02/24/2025 130/80  mmHg Final    Hemoglobin A1C 02/23/2025 5.50  4.80 - 5.60 % Final    Glucose 02/24/2025 111 (H)  65 - 99 mg/dL Final    BUN 02/24/2025 14  6 - 20 mg/dL Final    Creatinine 02/24/2025 0.82  0.76 - 1.27 mg/dL Final    Sodium 02/24/2025 138  136 - 145 mmol/L Final    Potassium 02/24/2025 3.7  3.5 - 5.2 mmol/L Final    Chloride 02/24/2025 106  98 - 107 mmol/L Final    CO2 02/24/2025 22.5  22.0 - 29.0 mmol/L Final    Calcium 02/24/2025 9.3  8.6 - 10.5 mg/dL Final    BUN/Creatinine Ratio 02/24/2025 17.1  7.0 - 25.0 Final    Anion Gap 02/24/2025 9.5  5.0 - 15.0 mmol/L Final    eGFR 02/24/2025 102.5  >60.0 mL/min/1.73 Final    WBC 02/24/2025 7.65  3.40 - 10.80 10*3/mm3 Final    RBC 02/24/2025 5.54   4.14 - 5.80 10*6/mm3 Final    Hemoglobin 02/24/2025 15.5  13.0 - 17.7 g/dL Final    Hematocrit 02/24/2025 46.6  37.5 - 51.0 % Final    MCV 02/24/2025 84.1  79.0 - 97.0 fL Final    MCH 02/24/2025 28.0  26.6 - 33.0 pg Final    MCHC 02/24/2025 33.3  31.5 - 35.7 g/dL Final    RDW 02/24/2025 14.0  12.3 - 15.4 % Final    RDW-SD 02/24/2025 43.3  37.0 - 54.0 fl Final    MPV 02/24/2025 9.8  6.0 - 12.0 fL Final    Platelets 02/24/2025 273  140 - 450 10*3/mm3 Final    Neutrophil % 02/24/2025 57.4  42.7 - 76.0 % Final    Lymphocyte % 02/24/2025 26.8  19.6 - 45.3 % Final    Monocyte % 02/24/2025 9.4  5.0 - 12.0 % Final    Eosinophil % 02/24/2025 5.9  0.3 - 6.2 % Final    Basophil % 02/24/2025 0.4  0.0 - 1.5 % Final    Immature Grans % 02/24/2025 0.1  0.0 - 0.5 % Final    Neutrophils, Absolute 02/24/2025 4.39  1.70 - 7.00 10*3/mm3 Final    Lymphocytes, Absolute 02/24/2025 2.05  0.70 - 3.10 10*3/mm3 Final    Monocytes, Absolute 02/24/2025 0.72  0.10 - 0.90 10*3/mm3 Final    Eosinophils, Absolute 02/24/2025 0.45 (H)  0.00 - 0.40 10*3/mm3 Final    Basophils, Absolute 02/24/2025 0.03  0.00 - 0.20 10*3/mm3 Final    Immature Grans, Absolute 02/24/2025 0.01  0.00 - 0.05 10*3/mm3 Final    nRBC 02/24/2025 0.0  0.0 - 0.2 /100 WBC Final    Aldosterone 02/24/2025 3.3  0.0 - 30.0 ng/dL Final    Renin Activity 02/24/2025 0.934  0.167 - 5.380 ng/mL/hr Final    Aldosterone/Renin Ratio 02/24/2025 3.5  0.0 - 30.0 Final                             Units:      ng/dL per ng/mL/hr    Glucose 02/25/2025 99  65 - 99 mg/dL Final    BUN 02/25/2025 15  6 - 20 mg/dL Final    Creatinine 02/25/2025 0.80  0.76 - 1.27 mg/dL Final    Sodium 02/25/2025 142  136 - 145 mmol/L Final    Potassium 02/25/2025 3.8  3.5 - 5.2 mmol/L Final    Chloride 02/25/2025 110 (H)  98 - 107 mmol/L Final    CO2 02/25/2025 21.5 (L)  22.0 - 29.0 mmol/L Final    Calcium 02/25/2025 8.8  8.6 - 10.5 mg/dL Final    BUN/Creatinine Ratio 02/25/2025 18.8  7.0 - 25.0 Final    Anion Gap  02/25/2025 10.5  5.0 - 15.0 mmol/L Final    eGFR 02/25/2025 103.2  >60.0 mL/min/1.73 Final    WBC 02/25/2025 8.35  3.40 - 10.80 10*3/mm3 Final    RBC 02/25/2025 5.41  4.14 - 5.80 10*6/mm3 Final    Hemoglobin 02/25/2025 15.3  13.0 - 17.7 g/dL Final    Hematocrit 02/25/2025 45.6  37.5 - 51.0 % Final    MCV 02/25/2025 84.3  79.0 - 97.0 fL Final    MCH 02/25/2025 28.3  26.6 - 33.0 pg Final    MCHC 02/25/2025 33.6  31.5 - 35.7 g/dL Final    RDW 02/25/2025 13.8  12.3 - 15.4 % Final    RDW-SD 02/25/2025 42.1  37.0 - 54.0 fl Final    MPV 02/25/2025 9.7  6.0 - 12.0 fL Final    Platelets 02/25/2025 243  140 - 450 10*3/mm3 Final    Neutrophil % 02/25/2025 62.1  42.7 - 76.0 % Final    Lymphocyte % 02/25/2025 22.4  19.6 - 45.3 % Final    Monocyte % 02/25/2025 8.1  5.0 - 12.0 % Final    Eosinophil % 02/25/2025 6.6 (H)  0.3 - 6.2 % Final    Basophil % 02/25/2025 0.4  0.0 - 1.5 % Final    Immature Grans % 02/25/2025 0.4  0.0 - 0.5 % Final    Neutrophils, Absolute 02/25/2025 5.19  1.70 - 7.00 10*3/mm3 Final    Lymphocytes, Absolute 02/25/2025 1.87  0.70 - 3.10 10*3/mm3 Final    Monocytes, Absolute 02/25/2025 0.68  0.10 - 0.90 10*3/mm3 Final    Eosinophils, Absolute 02/25/2025 0.55 (H)  0.00 - 0.40 10*3/mm3 Final    Basophils, Absolute 02/25/2025 0.03  0.00 - 0.20 10*3/mm3 Final    Immature Grans, Absolute 02/25/2025 0.03  0.00 - 0.05 10*3/mm3 Final    nRBC 02/25/2025 0.0  0.0 - 0.2 /100 WBC Final    Glucose 02/26/2025 107 (H)  65 - 99 mg/dL Final    BUN 02/26/2025 14  6 - 20 mg/dL Final    Creatinine 02/26/2025 0.86  0.76 - 1.27 mg/dL Final    Sodium 02/26/2025 141  136 - 145 mmol/L Final    Potassium 02/26/2025 3.2 (L)  3.5 - 5.2 mmol/L Final    Chloride 02/26/2025 111 (H)  98 - 107 mmol/L Final    CO2 02/26/2025 20.5 (L)  22.0 - 29.0 mmol/L Final    Calcium 02/26/2025 8.7  8.6 - 10.5 mg/dL Final    BUN/Creatinine Ratio 02/26/2025 16.3  7.0 - 25.0 Final    Anion Gap 02/26/2025 9.5  5.0 - 15.0 mmol/L Final    eGFR 02/26/2025 101.0   >60.0 mL/min/1.73 Final    WBC 02/26/2025 8.17  3.40 - 10.80 10*3/mm3 Final    RBC 02/26/2025 5.34  4.14 - 5.80 10*6/mm3 Final    Hemoglobin 02/26/2025 15.1  13.0 - 17.7 g/dL Final    Hematocrit 02/26/2025 46.2  37.5 - 51.0 % Final    MCV 02/26/2025 86.5  79.0 - 97.0 fL Final    MCH 02/26/2025 28.3  26.6 - 33.0 pg Final    MCHC 02/26/2025 32.7  31.5 - 35.7 g/dL Final    RDW 02/26/2025 13.9  12.3 - 15.4 % Final    RDW-SD 02/26/2025 44.0  37.0 - 54.0 fl Final    MPV 02/26/2025 10.0  6.0 - 12.0 fL Final    Platelets 02/26/2025 228  140 - 450 10*3/mm3 Final    Neutrophil % 02/26/2025 58.4  42.7 - 76.0 % Final    Lymphocyte % 02/26/2025 24.0  19.6 - 45.3 % Final    Monocyte % 02/26/2025 9.2  5.0 - 12.0 % Final    Eosinophil % 02/26/2025 7.8 (H)  0.3 - 6.2 % Final    Basophil % 02/26/2025 0.4  0.0 - 1.5 % Final    Immature Grans % 02/26/2025 0.2  0.0 - 0.5 % Final    Neutrophils, Absolute 02/26/2025 4.77  1.70 - 7.00 10*3/mm3 Final    Lymphocytes, Absolute 02/26/2025 1.96  0.70 - 3.10 10*3/mm3 Final    Monocytes, Absolute 02/26/2025 0.75  0.10 - 0.90 10*3/mm3 Final    Eosinophils, Absolute 02/26/2025 0.64 (H)  0.00 - 0.40 10*3/mm3 Final    Basophils, Absolute 02/26/2025 0.03  0.00 - 0.20 10*3/mm3 Final    Immature Grans, Absolute 02/26/2025 0.02  0.00 - 0.05 10*3/mm3 Final    nRBC 02/26/2025 0.0  0.0 - 0.2 /100 WBC Final    Potassium 02/26/2025 3.5  3.5 - 5.2 mmol/L Final    Glucose 02/27/2025 97  65 - 99 mg/dL Final    BUN 02/27/2025 10  6 - 20 mg/dL Final    Creatinine 02/27/2025 0.81  0.76 - 1.27 mg/dL Final    Sodium 02/27/2025 145  136 - 145 mmol/L Final    Potassium 02/27/2025 4.5  3.5 - 5.2 mmol/L Final    Chloride 02/27/2025 115 (H)  98 - 107 mmol/L Final    CO2 02/27/2025 20.0 (L)  22.0 - 29.0 mmol/L Final    Calcium 02/27/2025 8.7  8.6 - 10.5 mg/dL Final    BUN/Creatinine Ratio 02/27/2025 12.3  7.0 - 25.0 Final    Anion Gap 02/27/2025 10.0  5.0 - 15.0 mmol/L Final    eGFR 02/27/2025 102.8  >60.0 mL/min/1.73  Final    Normetanephrine 02/27/2025 96.2  0.0 - 244.0 pg/mL Final    Metanephrine 02/27/2025 38.3  0.0 - 88.0 pg/mL Final    WBC 02/27/2025 7.20  3.40 - 10.80 10*3/mm3 Final    RBC 02/27/2025 5.16  4.14 - 5.80 10*6/mm3 Final    Hemoglobin 02/27/2025 14.6  13.0 - 17.7 g/dL Final    Hematocrit 02/27/2025 44.3  37.5 - 51.0 % Final    MCV 02/27/2025 85.9  79.0 - 97.0 fL Final    MCH 02/27/2025 28.3  26.6 - 33.0 pg Final    MCHC 02/27/2025 33.0  31.5 - 35.7 g/dL Final    RDW 02/27/2025 13.6  12.3 - 15.4 % Final    RDW-SD 02/27/2025 42.8  37.0 - 54.0 fl Final    MPV 02/27/2025 10.2  6.0 - 12.0 fL Final    Platelets 02/27/2025 228  140 - 450 10*3/mm3 Final    Neutrophil % 02/27/2025 55.0  42.7 - 76.0 % Final    Lymphocyte % 02/27/2025 28.9  19.6 - 45.3 % Final    Monocyte % 02/27/2025 6.9  5.0 - 12.0 % Final    Eosinophil % 02/27/2025 8.3 (H)  0.3 - 6.2 % Final    Basophil % 02/27/2025 0.6  0.0 - 1.5 % Final    Immature Grans % 02/27/2025 0.3  0.0 - 0.5 % Final    Neutrophils, Absolute 02/27/2025 3.96  1.70 - 7.00 10*3/mm3 Final    Lymphocytes, Absolute 02/27/2025 2.08  0.70 - 3.10 10*3/mm3 Final    Monocytes, Absolute 02/27/2025 0.50  0.10 - 0.90 10*3/mm3 Final    Eosinophils, Absolute 02/27/2025 0.60 (H)  0.00 - 0.40 10*3/mm3 Final    Basophils, Absolute 02/27/2025 0.04  0.00 - 0.20 10*3/mm3 Final    Immature Grans, Absolute 02/27/2025 0.02  0.00 - 0.05 10*3/mm3 Final    nRBC 02/27/2025 0.0  0.0 - 0.2 /100 WBC Final   Admission on 02/23/2025, Discharged on 02/23/2025   Component Date Value Ref Range Status    Glucose 02/23/2025 115 (H)  65 - 99 mg/dL Final    BUN 02/23/2025 15  6 - 20 mg/dL Final    Creatinine 02/23/2025 0.83  0.76 - 1.27 mg/dL Final    Sodium 02/23/2025 138  136 - 145 mmol/L Final    Potassium 02/23/2025 4.1  3.5 - 5.2 mmol/L Final    Chloride 02/23/2025 107  98 - 107 mmol/L Final    CO2 02/23/2025 21.0 (L)  22.0 - 29.0 mmol/L Final    Calcium 02/23/2025 9.3  8.6 - 10.5 mg/dL Final    Total Protein  02/23/2025 7.4  6.0 - 8.5 g/dL Final    Albumin 02/23/2025 4.4  3.5 - 5.2 g/dL Final    ALT (SGPT) 02/23/2025 22  1 - 41 U/L Final    AST (SGOT) 02/23/2025 17  1 - 40 U/L Final    Alkaline Phosphatase 02/23/2025 75  39 - 117 U/L Final    Total Bilirubin 02/23/2025 0.4  0.0 - 1.2 mg/dL Final    Globulin 02/23/2025 3.0  gm/dL Final    A/G Ratio 02/23/2025 1.5  g/dL Final    BUN/Creatinine Ratio 02/23/2025 18.1  7.0 - 25.0 Final    Anion Gap 02/23/2025 10.0  5.0 - 15.0 mmol/L Final    eGFR 02/23/2025 102.1  >60.0 mL/min/1.73 Final    HS Troponin T 02/23/2025 <6  <22 ng/L Final    QT Interval 02/23/2025 388  ms Final    QTC Interval 02/23/2025 414  ms Final    WBC 02/23/2025 7.95  3.40 - 10.80 10*3/mm3 Final    RBC 02/23/2025 5.53  4.14 - 5.80 10*6/mm3 Final    Hemoglobin 02/23/2025 15.7  13.0 - 17.7 g/dL Final    Hematocrit 02/23/2025 47.2  37.5 - 51.0 % Final    MCV 02/23/2025 85.4  79.0 - 97.0 fL Final    MCH 02/23/2025 28.4  26.6 - 33.0 pg Final    MCHC 02/23/2025 33.3  31.5 - 35.7 g/dL Final    RDW 02/23/2025 14.2  12.3 - 15.4 % Final    RDW-SD 02/23/2025 44.1  37.0 - 54.0 fl Final    MPV 02/23/2025 9.7  6.0 - 12.0 fL Final    Platelets 02/23/2025 236  140 - 450 10*3/mm3 Final    Neutrophil % 02/23/2025 60.6  42.7 - 76.0 % Final    Lymphocyte % 02/23/2025 25.5  19.6 - 45.3 % Final    Monocyte % 02/23/2025 7.2  5.0 - 12.0 % Final    Eosinophil % 02/23/2025 6.2  0.3 - 6.2 % Final    Basophil % 02/23/2025 0.4  0.0 - 1.5 % Final    Immature Grans % 02/23/2025 0.1  0.0 - 0.5 % Final    Neutrophils, Absolute 02/23/2025 4.82  1.70 - 7.00 10*3/mm3 Final    Lymphocytes, Absolute 02/23/2025 2.03  0.70 - 3.10 10*3/mm3 Final    Monocytes, Absolute 02/23/2025 0.57  0.10 - 0.90 10*3/mm3 Final    Eosinophils, Absolute 02/23/2025 0.49 (H)  0.00 - 0.40 10*3/mm3 Final    Basophils, Absolute 02/23/2025 0.03  0.00 - 0.20 10*3/mm3 Final    Immature Grans, Absolute 02/23/2025 0.01  0.00 - 0.05 10*3/mm3 Final    nRBC 02/23/2025 0.0  0.0  - 0.2 /100 WBC Final    Extra Tube 02/23/2025 Hold for add-ons.   Final    Auto resulted.    Extra Tube 02/23/2025 Hold for add-ons.   Final    Auto resulted.    Extra Tube 02/23/2025 Hold for add-ons.   Final    Auto resulted   No results displayed because visit has over 200 results.      Admission on 12/17/2024, Discharged on 12/17/2024   Component Date Value Ref Range Status    QT Interval 12/17/2024 433  ms Final    QTC Interval 12/17/2024 474  ms Final    Glucose 12/17/2024 93  65 - 99 mg/dL Final    BUN 12/17/2024 14  6 - 20 mg/dL Final    Creatinine 12/17/2024 1.01  0.76 - 1.27 mg/dL Final    Sodium 12/17/2024 140  136 - 145 mmol/L Final    Potassium 12/17/2024 3.3 (L)  3.5 - 5.2 mmol/L Final    Chloride 12/17/2024 103  98 - 107 mmol/L Final    CO2 12/17/2024 22.0  22.0 - 29.0 mmol/L Final    Calcium 12/17/2024 9.4  8.6 - 10.5 mg/dL Final    Total Protein 12/17/2024 7.3  6.0 - 8.5 g/dL Final    Albumin 12/17/2024 4.5  3.5 - 5.2 g/dL Final    ALT (SGPT) 12/17/2024 26  1 - 41 U/L Final    AST (SGOT) 12/17/2024 20  1 - 40 U/L Final    Alkaline Phosphatase 12/17/2024 73  39 - 117 U/L Final    Total Bilirubin 12/17/2024 0.5  0.0 - 1.2 mg/dL Final    Globulin 12/17/2024 2.8  gm/dL Final    A/G Ratio 12/17/2024 1.6  g/dL Final    BUN/Creatinine Ratio 12/17/2024 13.9  7.0 - 25.0 Final    Anion Gap 12/17/2024 15.0  5.0 - 15.0 mmol/L Final    eGFR 12/17/2024 86.7  >60.0 mL/min/1.73 Final    proBNP 12/17/2024 148.0  0.0 - 900.0 pg/mL Final    HS Troponin T 12/17/2024 <6  <22 ng/L Final    Extra Tube 12/17/2024 Hold for add-ons.   Final    Auto resulted.    Extra Tube 12/17/2024 hold for add-on   Final    Auto resulted    Extra Tube 12/17/2024 Hold for add-ons.   Final    Auto resulted.    Extra Tube 12/17/2024 Hold for add-ons.   Final    Auto resulted    WBC 12/17/2024 8.98  3.40 - 10.80 10*3/mm3 Final    RBC 12/17/2024 5.87 (H)  4.14 - 5.80 10*6/mm3 Final    Hemoglobin 12/17/2024 16.3  13.0 - 17.7 g/dL Final     Hematocrit 12/17/2024 48.4  37.5 - 51.0 % Final    MCV 12/17/2024 82.5  79.0 - 97.0 fL Final    MCH 12/17/2024 27.8  26.6 - 33.0 pg Final    MCHC 12/17/2024 33.7  31.5 - 35.7 g/dL Final    RDW 12/17/2024 14.7  12.3 - 15.4 % Final    RDW-SD 12/17/2024 43.9  37.0 - 54.0 fl Final    MPV 12/17/2024 9.8  6.0 - 12.0 fL Final    Platelets 12/17/2024 286  140 - 450 10*3/mm3 Final    Neutrophil % 12/17/2024 46.3  42.7 - 76.0 % Final    Lymphocyte % 12/17/2024 36.9  19.6 - 45.3 % Final    Monocyte % 12/17/2024 8.9  5.0 - 12.0 % Final    Eosinophil % 12/17/2024 7.5 (H)  0.3 - 6.2 % Final    Basophil % 12/17/2024 0.3  0.0 - 1.5 % Final    Immature Grans % 12/17/2024 0.1  0.0 - 0.5 % Final    Neutrophils, Absolute 12/17/2024 4.16  1.70 - 7.00 10*3/mm3 Final    Lymphocytes, Absolute 12/17/2024 3.31 (H)  0.70 - 3.10 10*3/mm3 Final    Monocytes, Absolute 12/17/2024 0.80  0.10 - 0.90 10*3/mm3 Final    Eosinophils, Absolute 12/17/2024 0.67 (H)  0.00 - 0.40 10*3/mm3 Final    Basophils, Absolute 12/17/2024 0.03  0.00 - 0.20 10*3/mm3 Final    Immature Grans, Absolute 12/17/2024 0.01  0.00 - 0.05 10*3/mm3 Final    nRBC 12/17/2024 0.0  0.0 - 0.2 /100 WBC Final    Protime 12/17/2024 13.0  11.7 - 14.2 Seconds Final    INR 12/17/2024 0.96  0.90 - 1.10 Final    Magnesium 12/17/2024 2.0  1.6 - 2.6 mg/dL Final    HS Troponin T 12/17/2024 <6  <22 ng/L Final    Troponin T Numeric Delta 12/17/2024    Final    Unable to calculate.    Site 12/17/2024 Right Radial   Final    Min's Test 12/17/2024 Positive   Final    pH, Arterial 12/17/2024 7.626 (C)  7.350 - 7.450 pH units Final    pCO2, Arterial 12/17/2024 17.5 (C)  35.0 - 45.0 mm Hg Final    pO2, Arterial 12/17/2024 91.5  80.0 - 100.0 mm Hg Final    HCO3, Arterial 12/17/2024 18.3 (L)  22.0 - 28.0 mmol/L Final    Base Excess, Arterial 12/17/2024 0.5  0.0 - 2.0 mmol/L Final    Serial Number: [id number]:  [id number]    O2 Saturation, Arterial 12/17/2024 98.7 (H)  92.0 - 98.5 % Final    CO2 Content  12/17/2024 18.8 (L)  23 - 27 mmol/L Final    Barometric Pressure for Blood Gas 12/17/2024 755.9000  mmHg Final    Modality 12/17/2024 Room Air   Final    Rate 12/17/2024 24  Breaths/minute Final    Notified Who 12/17/2024 hector preston   Final    Read Back 12/17/2024 Yes   Final    Hemodilution 12/17/2024 No   Final    D-Dimer, Quantitative 12/17/2024 0.58 (H)  0.00 - 0.57 MCGFEU/mL Final         Assessment & Plan   Problems Addressed this Visit    None  Visit Diagnoses         Severe episode of recurrent major depressive disorder, without psychotic features    -  Primary    Relevant Medications    Cariprazine HCl (Vraylar) 1.5 MG capsule capsule    Other Relevant Orders    Ambulatory Referral to Behavioral Health      Panic anxiety syndrome        Relevant Medications    Cariprazine HCl (Vraylar) 1.5 MG capsule capsule    cloNIDine (CATAPRES) 0.1 MG tablet    Other Relevant Orders    Ambulatory Referral to Behavioral Health      Sleep disturbances        Relevant Medications    cloNIDine (CATAPRES) 0.1 MG tablet    Other Relevant Orders    Ambulatory Referral to Behavioral Health      Medication management        Relevant Medications    Cariprazine HCl (Vraylar) 1.5 MG capsule capsule    cloNIDine (CATAPRES) 0.1 MG tablet          Diagnoses         Codes Comments      Severe episode of recurrent major depressive disorder, without psychotic features    -  Primary ICD-10-CM: F33.2  ICD-9-CM: 296.33       Panic anxiety syndrome     ICD-10-CM: F41.0  ICD-9-CM: 300.01       Sleep disturbances     ICD-10-CM: G47.9  ICD-9-CM: 780.50       Medication management     ICD-10-CM: Z79.899  ICD-9-CM: V58.69             Visit Diagnoses:    ICD-10-CM ICD-9-CM   1. Severe episode of recurrent major depressive disorder, without psychotic features  F33.2 296.33   2. Panic anxiety syndrome  F41.0 300.01   3. Sleep disturbances  G47.9 780.50   4. Medication management  Z79.899 V58.69       Continue Viibryd, does not need refills at this  point.  BuSpar discontinued as patient is no longer taking.  Vraylar 1.5 mg daily initiated.  Atarax discontinued and replaced with clonidine 0.1 daily as needed for anxiety/sleep.  Hopefully this will also provide some relief with hypertension.  Patient is educated upon risks versus benefits as well as side effects and when to seek care in an emergency setting.  Patient verbalized understanding. Instructions sent via my chart regarding new medication at this time.  Discussed continuing to monitor blood pressure with use of Vraylar, discussed need for monitoring of lipids and triglycerides with use of this medication as well given stroke history, patient states he has a physical scheduled next month.  Therapy strongly encouraged, patient receptive and referral placed.  Follow up with this provider in 4 to 6 weeks or sooner if needed.    Discussed medication options and treatment plan of prescribed medication, any off label use of medication, as well as the risks, benefits, any black box warnings including increased suicidality, and side effects including but not limited to potential falls, dizziness, possible impaired driving, GI side effects (change in appetite, abdominal discomfort, nausea, vomiting, diarrhea, and/or constipation), dry mouth, somnolence, sedation, insomnia, activation, agitation, irritation, tremors, abnormal muscle movements or disorders, tardive dyskinesia, akathisia, asthenia, headache, sweating, possible bruising or rare bleeding, electrolyte and/or fluid abnormalities, change in blood pressure/heart rate/and or heart rhythm, hypotension, sexual dysfunction, rare impulse control problems, rare seizures, rare neuroleptic malignant syndrome, increased risk of death and cerebrovascular events, change in blood glucose and increased risk for diabetes, change in triglycerides and cholesterol and increased risk for dyslipidemia,  weight gain, weight gain that can become problematic to health, skin  conditions and reactions, and metabolic adversities among others. Patient and/or guardian are agreeable to call the office with any worsening of symptoms or onset of side effects, or if any concerns or questions arise.  The contact information for the office is made available to the patient and/or guardian. Patient and/or guardian are agreeable to call 911 or go to the nearest ER should they begin having any SI/HI, or if any urgent concerns arise.        GOALS:  Short Term Goals: Patient will be compliant with medication, and patient will have no significant medication related side effects.  Patient will be engaged in psychotherapy as indicated.  Patient will report subjective improvement of symptoms.  Long term goals: To stabilize mood and treat/improve subjective symptoms, the patient will stay out of the hospital, the patient will be at an optimal level of functioning, and the patient will take all medications as prescribed.  The patient/guardian verbalized understanding and agreement with goals that were mutually set.    Discussed practice no show policy, informed patient 3 no shows would results to referral for in-person psychiatry to better assure compliance in addressing mental health.    Discussed limitations to telehealth, if at any point a higher level of care or closer monitoring would be warranted, referral to in person psychiatry would be placed. Also this provider does not make determinations related to disability status. If at any point in time patient feels they need to obtain disability, a referral to a higher level of care with in person psychiatrist will be made to more appropriately navigate determination of disability, whether short or long term is needed.     TREATMENT PLAN: Continue supportive psychotherapy efforts and medications as indicated.  Pharmacological and Non-Pharmacological treatment options discussed during today's visit. Patient/Guardian acknowledged and verbally consented with  current treatment plan and was educated on the importance of compliance with treatment and follow-up appointments.      MEDICATION ISSUES:  Discussed medication options and treatment plan of prescribed medication as well as the risks, benefits, any black box warnings, and side effects including potential falls, possible impaired driving, and metabolic adversities among others. Patient is agreeable to call the office with any worsening of symptoms or onset of side effects, or if any concerns or questions arise.  The contact information for the office is made available to the patient. Patient is agreeable to call 911 or go to the nearest ER should they begin having any SI/HI, or if any urgent concerns arise. No medication side effects or related complaints today.     MEDS ORDERED DURING VISIT:  New Medications Ordered This Visit   Medications    Cariprazine HCl (Vraylar) 1.5 MG capsule capsule     Sig: Take 1 capsule by mouth Daily.     Dispense:  30 capsule     Refill:  0    cloNIDine (CATAPRES) 0.1 MG tablet     Sig: Take 1 tablet by mouth Daily As Needed (anxiety).     Dispense:  30 tablet     Refill:  0       Follow Up Appointment:   Return in about 4 weeks (around 6/24/2025) for Recheck.             This document has been electronically signed by JANEEN Palmer  May 27, 2025 12:14 EDT    Dictated Utilizing Dragon Dictation: Part of this note may be an electronic transcription/translation of spoken language to printed text using the Dragon Dictation System.

## 2025-05-27 NOTE — TELEPHONE ENCOUNTER
PA denied stating patient must have a diagnosis of MDD, failure of 3 antidepressants from at least TWO different classes (e.g., selective serotonin   reuptake inhibitor [SSRI], serotonin-norepinephrine reuptake inhibitor [SNRI], tricyclic   antidepressant [TCA], bupropion, mirtazapine) at up to maximally indicated doses, each used for at   least 4 weeks, unless member is unable to satisfy this requirement due to clinically significant   adverse effects experienced, member’s age at least 65 years, or contraindication(s) to multiple antidepressants, and patient must have tried and failed a 4 week trial of aripiprazole.     Please advise.

## 2025-05-27 NOTE — TELEPHONE ENCOUNTER
Attempted to contact patient by phone but did not get an answer. Left voicemail to make patient aware.

## 2025-05-28 ENCOUNTER — TELEPHONE (OUTPATIENT)
Dept: PSYCHIATRY | Facility: CLINIC | Age: 60
End: 2025-05-28
Payer: COMMERCIAL

## 2025-05-28 NOTE — TELEPHONE ENCOUNTER
PA appeal is approved.  Patient and pharmacy made aware. Patient will  vraylar at the pharmacy for $40

## 2025-06-16 ENCOUNTER — TELEPHONE (OUTPATIENT)
Dept: PSYCHIATRY | Facility: CLINIC | Age: 60
End: 2025-06-16
Payer: COMMERCIAL

## 2025-06-16 NOTE — TELEPHONE ENCOUNTER
Pt called back and has been made aware.    Pt states that he is not taking the clonidine very often.  Pt states he is taking the Vraylar every morning.  Pt states he will hold off on the Clonidine and take the Vraylar every other day until his next appt with the provider in a couple weeks to see if this helps with the fatigue.

## 2025-06-16 NOTE — TELEPHONE ENCOUNTER
Attempted to contact patient by phone but did not get an answer.  Left voicemail and sent my chart message for patient to contact the office.

## 2025-06-16 NOTE — TELEPHONE ENCOUNTER
Patient called stating that the medication he started 2 weeks ago has not helped with his depression but it is causing extreme fatigue.  Patient would like to know if this is normal and will it stop.  Please advise.  Patient states he is utterly exhausted.

## 2025-06-17 ENCOUNTER — OFFICE VISIT (OUTPATIENT)
Dept: CARDIOLOGY | Age: 60
End: 2025-06-17
Payer: COMMERCIAL

## 2025-06-17 ENCOUNTER — TELEPHONE (OUTPATIENT)
Dept: CARDIOLOGY | Facility: HOSPITAL | Age: 60
End: 2025-06-17
Payer: COMMERCIAL

## 2025-06-17 VITALS — OXYGEN SATURATION: 99 % | BODY MASS INDEX: 28.28 KG/M2 | WEIGHT: 220.4 LBS | HEIGHT: 74 IN

## 2025-06-17 DIAGNOSIS — I65.02 VERTEBRAL ARTERY STENOSIS, LEFT: ICD-10-CM

## 2025-06-17 DIAGNOSIS — Z86.79 HISTORY OF VERTEBRAL ARTERY STENOSIS: ICD-10-CM

## 2025-06-17 DIAGNOSIS — Z86.73 HISTORY OF CVA (CEREBROVASCULAR ACCIDENT): ICD-10-CM

## 2025-06-17 DIAGNOSIS — E78.41 ELEVATED LIPOPROTEIN(A): ICD-10-CM

## 2025-06-17 DIAGNOSIS — E78.2 MIXED HYPERLIPIDEMIA: ICD-10-CM

## 2025-06-17 DIAGNOSIS — I48.0 PAF (PAROXYSMAL ATRIAL FIBRILLATION): ICD-10-CM

## 2025-06-17 DIAGNOSIS — R93.1 ELEVATED CORONARY ARTERY CALCIUM SCORE: ICD-10-CM

## 2025-06-17 DIAGNOSIS — Z95.818 IMPLANTABLE LOOP RECORDER PRESENT: ICD-10-CM

## 2025-06-17 DIAGNOSIS — F10.21 HISTORY OF ALCOHOL DEPENDENCE: ICD-10-CM

## 2025-06-17 DIAGNOSIS — R41.3 MEMORY LOSS: ICD-10-CM

## 2025-06-17 DIAGNOSIS — I10 ESSENTIAL HYPERTENSION: Primary | Chronic | ICD-10-CM

## 2025-06-17 DIAGNOSIS — I69.393 ATAXIA DUE TO OLD CEREBELLAR INFARCTION: ICD-10-CM

## 2025-06-17 RX ORDER — AMLODIPINE BESYLATE 10 MG/1
10 TABLET ORAL
Qty: 90 TABLET | Refills: 3 | Status: ON HOLD | OUTPATIENT
Start: 2025-06-17

## 2025-06-17 RX ORDER — ATORVASTATIN CALCIUM 40 MG/1
40 TABLET, FILM COATED ORAL NIGHTLY
Qty: 90 TABLET | Refills: 3 | Status: ON HOLD | OUTPATIENT
Start: 2025-06-17 | End: 2026-06-17

## 2025-06-17 RX ORDER — CARVEDILOL 12.5 MG/1
12.5 TABLET ORAL 2 TIMES DAILY WITH MEALS
Qty: 180 TABLET | Refills: 3 | Status: ON HOLD | OUTPATIENT
Start: 2025-06-17

## 2025-06-17 RX ORDER — SPIRONOLACTONE 25 MG/1
25 TABLET ORAL DAILY
COMMUNITY
End: 2025-06-17 | Stop reason: SDUPTHER

## 2025-06-17 RX ORDER — SPIRONOLACTONE 25 MG/1
25 TABLET ORAL DAILY
Qty: 90 TABLET | Refills: 3 | Status: ON HOLD | OUTPATIENT
Start: 2025-06-17 | End: 2026-06-17

## 2025-06-17 RX ORDER — LISINOPRIL 40 MG/1
40 TABLET ORAL DAILY
Qty: 90 TABLET | Refills: 3 | Status: ON HOLD | OUTPATIENT
Start: 2025-06-17

## 2025-06-17 NOTE — TELEPHONE ENCOUNTER
Dr Taylor has referred Flako for a LAAO/Watchman evaluation. I have called & spoken with Flako. We discussed Watchman and he thinks he has a good understanding. He does not currently have any questions. I am mailing a shared decision-making tool and information on Watchman. I've provided my contact # and encouraged him to call with any questions. An appt with Dr Ortiz has been requested. RTRN

## 2025-06-17 NOTE — PROGRESS NOTES
[facility]    Subjective:     Encounter Date:06/17/25      Patient ID: Flako Coreas Sr. is a 60 y.o. male.    Chief Complaint:   Chief Complaint   Patient presents with    History of CVA with residual deficit     Patient is in the office today for his 3 month follow up appointment.    Follow-up syncopal episodes  History of Present Illness      Mr. Coreas is a pleasant 60 y.o. gentleman who presents for follow-up.  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  He had numerous syncopal episodes since that time.    He continued have recurrent blackout spells.  He has been evaluated by this by neurology, and is undergoing further evaluation for these as well.    He underwent ILR placement for the recurrent loss of consciousness spells and falls.  There have been a few pauses which were thought to be vasovagal in origin, but they did not correlate with significant syncopal episodes.  He follows with cardiac EP for this.     He is also been noted to have intermittent episodes of atrial fibrillation, short, nonsustained episodes, and actually has not been noted on the last  loop recorder interrogations.   I have reviewed the patient's most recent loop recorder interrogation was performed on May 5, 2025.  It showed no evidence of any arrhythmias.  Sinus bradycardia with an average rate of 54 bpm at rest.  There have been no other new onset pauses    The longest A-fib episodes have been short, and insignificant.  He has not had recurrent strokes.    He continued have recurrent falls.  In February 2025He had another fall episode that was occurring the setting of hypertension.  There were issues with compliance in the past due to lack of insurance but that has settled out more recently.   .    He presents today for follow-up.  He has not had any other A-fib episodes recently on his device interrogations, but he continues to be a little bit unsteady on his feet from  his prior CVA.  He remains off the Eliquis due to the concerns for intermittent falls and trauma risk    He was having a lot of anxiety recently and follows with his neurologist as well as primary care, this was causing his blood pressure to be high.  He has subsequently seen a psychiatrist and this has improved quite a bit.  He is actually quite calm and cooperative today.    He underwent a tilt table test in March or 2025 which was unremarkable.    Previous cardiac testing:  MEGHANN August 22, 2023:  Left ventricular systolic function is hyperdynamic (EF > 70%).    Left ventricular wall thickness is consistent with mild to moderate concentric hypertrophy.    Left ventricular diastolic function is consistent with (grade I) impaired relaxation.    Normal right ventricular cavity size and systolic function noted.    The left atrial cavity is mildly dilated.    No evidence of a left atrial appendage thrombus was present    No evidence of a patent foramen ovale. Saline test results are negative.    There are very mild plaques in the distal descending thoracic aorta. There are no plaques in the aortic arch or ascending aorta    There is no evidence of pericardial effusion    Stress test October 2, 2023:    Equivocal ECG evidence of myocardial ischemia.  This was a submaximal stress test.  73% of age-predicted heart rate achieved.    Negative clinical evidence of myocardial ischemia.     30-day event recorder October 20, 2023:  A normal monitor study.     14-day patch Holter: November 17, 2023:  There were 14 episodes of nonsustained supraventricular tachycardia, longest lasting 12 beats and the fastest with a rate of 197 bpm.  No sustained arrhythmias or pauses.        The following portions of the patient's history were reviewed and updated as appropriate: allergies, current medications, past family history, past medical history, past social history, past surgical history and problem list.    Past Medical History:    Diagnosis Date    ADHD (attention deficit hyperactivity disorder)     On going issue    Anxiety     Lepe's esophagus     Benign prostatic hyperplasia Surgery in 12/2021    Brain concussion 11/2023    Clotting disorder Intestinal    Depression     Diverticulitis     Diverticulosis     Duodenitis     Enteritis     Failure to thrive (child)     Family history of blood clots     Fracture of wrist 1985    Fracture, foot 2019    GERD (gastroesophageal reflux disease)     Hyperlipidemia     Hypertension     Hypertensive emergency     Implantable loop recorder present 03/12/2025    Irritable bowel syndrome 2017    Low testosterone     Microcytosis     Pancreatitis     Peptic ulcer disease 02/23/2025    Pneumonia     PONV (postoperative nausea and vomiting)     Seasonal affective disorder     Shoulder injury     Stroke     Unexplained weight loss     Visual impairment 8/2023       Past Surgical History:   Procedure Laterality Date    ABDOMINAL SURGERY      CARDIAC ELECTROPHYSIOLOGY PROCEDURE N/A 01/24/2024    Procedure: Loop insertion- [employer] LINQ;  Surgeon: Kaela Walker MD;  Location: [facility] CATH INVASIVE LOCATION;  Service: Cardiovascular;  Laterality: N/A;    COLON SURGERY      COLONOSCOPY      COLONOSCOPY N/A 12/11/2022    Procedure: COLONOSCOPY INTO CECUM WITH HOT SNARE POLYPECTOMY;  Surgeon: Albert Gallo MD;  Location: [facility] ENDOSCOPY;  Service: Gastroenterology;  Laterality: N/A;  PRE- GI BLEED  POST- DIVERTICULOSIS, POLYP    COLONOSCOPY N/A 02/14/2024    Procedure: COLONOSCOPY into cecum/terminal ileum;  Surgeon: Albert Gallo MD;  Location: [facility] ENDOSCOPY;  Service: Gastroenterology;  Laterality: N/A;  diverticulosis, hemorrhoids    ENDOSCOPY N/A 12/10/2022    Procedure: ESOPHAGOGASTRODUODENOSCOPY;  Surgeon: Albert Gallo MD;  Location: [facility] ENDOSCOPY;  Service: Gastroenterology;  Laterality: N/A;  PRE- DARK STOOLS  POST- BARRETTS ESOPHAGUS    ENDOSCOPY N/A 02/14/2024    Procedure:  "ESOPHAGOGASTRODUODENOSCOPY with biopsy;  Surgeon: Albert Gallo MD;  Location: [facility] ENDOSCOPY;  Service: Gastroenterology;  Laterality: N/A;  long segment wilson's, hiatal hernia    FRACTURE SURGERY Right 1980    for broken arm    FRACTURE SURGERY Right     for broken hand    GASTRECTOMY      HAND SURGERY  1990    INCISION AND DRAINAGE ABSCESS  2015    anal    PROSTATE SURGERY      SHOULDER SURGERY  2022    SKIN BIOPSY      SMALL INTESTINE SURGERY      TONSILLECTOMY      TRIGGER POINT INJECTION  2017         Procedures       Objective:     Vitals:    06/17/25 1024   SpO2: 99%   Weight: 100 kg (220 lb 6.4 oz)   Height: 188 cm (74.02\")           Constitutional:       Appearance: Healthy appearance. Not in distress.   Neck:      Vascular: JVD normal.   Pulmonary:      Effort: Pulmonary effort is normal.      Breath sounds: Normal breath sounds.   Cardiovascular:      PMI at left midclavicular line. Normal rate. Regular rhythm. Normal S2.       Murmurs: There is no murmur.   Pulses:     Intact distal pulses.   Edema:     Peripheral edema absent.   Skin:     General: Skin is warm and dry.   Neurological:      General: No focal deficit present.      Mental Status: Alert, oriented to person, place, and time and oriented to person, place and time.   Psychiatric:         Mood and Affect: Mood and affect normal.     Orthostatics were completed in clinic today as follows  Vitals:    06/17/25 1024   Orthostatic BP: 150/82   Orthostatic Pulse: 64   Patient Position: Lying     , Standing 140/84, heart rate 76.      Lab Review:     Lipid Panel          1/26/2025    06:09   Lipid Panel   Total Cholesterol 163    Triglycerides 154    HDL Cholesterol 33    VLDL Cholesterol 27    LDL Cholesterol  103    LDL/HDL Ratio 3.01      BUN   Date Value Ref Range Status   05/21/2025 25 (H) 6 - 20 mg/dL Final   08/27/2022 16 8 - 26 mg/dL Final     Creatinine   Date Value Ref Range Status   05/21/2025 1.00 0.76 - 1.27 mg/dL Final "   10/23/2023 1.30 0.60 - 1.30 mg/dL Final     Comment:     Serial Number: [id number]:  [id number]   08/27/2022 0.96 0.73 - 1.18 mg/dL Final   09/28/2018 0.93 0.73 - 1.22 mg/dL Final     Potassium   Date Value Ref Range Status   05/21/2025 4.0 3.5 - 5.2 mmol/L Final   08/27/2022 3.7 3.5 - 5.1 mmol/L Final     ALT (SGPT)   Date Value Ref Range Status   05/21/2025 23 1 - 41 U/L Final   08/27/2022 20 0 - 55 U/L Final     AST (SGOT)   Date Value Ref Range Status   05/21/2025 19 1 - 40 U/L Final   08/27/2022 20 5 - 34 U/L Final         Performed        Assessment:          Diagnosis Plan   1. Essential hypertension        2. Mixed hyperlipidemia        3. Vertebral artery stenosis, left        4. Elevated lipoprotein(a)        5. History of CVA (cerebrovascular accident)        6. Memory loss        7. 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 History of vertebral artery stenosis        9. Implantable loop recorder present        10. PAF (paroxysmal atrial fibrillation)        11. History of alcohol dependence        12. Elevated coronary artery calcium score                 Plan:         Intermittent syncopal episodes: Remain unclear.  Orthostatics negative today.  Tilt table test unremarkable.  He follows with neurology as well.  No arrhythmia correlates on his ILR.   Sinus pauses, rare.  Follows with cardiac EP.  Likely vagal mediated.  Did not correlate with syncope.     Hypertension, labile: I do not think significant orthostasis has contributed to fall also continue blood pressure management per below  Continue follow-up with sleep clinic for sleep apnea treatment  Continue carvedilol 12.5 twice daily, his resting heart rate is around 58 bpm per his loop recorder so I would continue this regimen.  Amlodipine 10  Spironolactone 25  Lisinopril 40  24-hour catecholamines negative during hospitalization, renin/aldosterone ratio normal 2/2025   KRIS.  Continue follow-up with sleep clinic.  CVA, suspected embolic:  previously had had had a lengthy discussion with his neurologist Dr. Leon.  He said a few different neurologic events, will some could be related to intracerebral atherosclerosis and plaque mediated, but there does appear to be some that, including the initial CVA episode, that were embolic.    He is only had 16 minutes of A-fib on his ILR, but he could have had longer episodes initially.   He is at high risk for recurrent embolic events given his risk factors  We discussed Eliquis anticoagulation the past but he continues to have these intermittent falls of unclear etiology.  In light of this, after Utilizing shared decision-making, the patient has decided to pursue left atrial appendage occlusion evaluation.  He/She has been deemed to be a suitable candidate for short-term anticoagulation therapy, but unable to take long-term anticoagulation secondary to recurrent falls   Continue aspirin 81  Coronary artery calcium, intracerebral atherosclerosis  Hyperlipidemia  Elevated lipoprotein a  Unfortunately his cholesterol regimen has been all over the place due to lapse his insurance  For now, continue atorvastatin 40, may need ezetimibe if LDL remains greater than 70.  May also need Repatha but this was not covered by his insurance previously.       RTC 6 months.  Watchman evaluation in the interim.  His blood pressure regimen was renewed today.    Chinmay Taylor MD  [facility]  06/17/25  13:26 EST       Current Outpatient Medications:     amLODIPine (NORVASC) 10 MG tablet, Take 1 tablet by mouth Daily., Disp: 90 tablet, Rfl: 3    aspirin 81 MG chewable tablet, Chew 1 tablet Daily., Disp: , Rfl:     atorvastatin (LIPITOR) 40 MG tablet, Take 1 tablet by mouth Every Night., Disp: 90 tablet, Rfl: 3    busPIRone (BUSPAR) 10 MG tablet, Take 1 tablet by mouth Every 8 (Eight) Hours., Disp: 90 tablet, Rfl: 0    Cariprazine HCl (Vraylar) 1.5 MG capsule capsule, Take 1 capsule by mouth Daily., Disp: 30  capsule, Rfl: 0    carvedilol (COREG) 12.5 MG tablet, Take 1 tablet by mouth 2 (Two) Times a Day With Meals., Disp: 180 tablet, Rfl: 3    cloNIDine (CATAPRES) 0.1 MG tablet, Take 1 tablet by mouth Daily As Needed (anxiety)., Disp: 30 tablet, Rfl: 0    folic acid (FOLVITE) 1 MG tablet, Take 1 tablet by mouth Daily., Disp: 30 tablet, Rfl: 0    lisinopril (PRINIVIL,ZESTRIL) 40 MG tablet, Take 1 tablet by mouth Daily., Disp: 90 tablet, Rfl: 3    pantoprazole (PROTONIX) 40 MG EC tablet, Take 1 tablet by mouth Every Morning Before Breakfast., Disp: 30 tablet, Rfl: 0    spironolactone (ALDACTONE) 25 MG tablet, Take 1 tablet by mouth Daily., Disp: 90 tablet, Rfl: 3    thiamine (VITAMIN B1) 100 MG tablet, Take 1 tablet by mouth Daily., Disp: 30 tablet, Rfl: 0    vilazodone (Viibryd) 40 MG tablet tablet, Take 1 tablet by mouth Daily., Disp: 30 tablet, Rfl: 6    vitamin B-12 (CYANOCOBALAMIN) 500 MCG tablet, Take 1 tablet by mouth Daily., Disp: 90 tablet, Rfl: 0         Return in about 6 months (around 12/17/2025).      Part of this note may be an electronic transcription/translation of spoken language to printed text using the Dragon Dictation System.

## 2025-06-17 NOTE — PATIENT INSTRUCTIONS
I would recommend that we make sure we stay on the atorvastatin medication which helps lower the bad cholesterol, LDL reduces the risk of future heart attacks and strokes.    You should stay on the baby aspirin 81 mg/day for now.  Thankfully did not have any atrial fibrillation episodes recently, but because of the history of strokes, I think it is worthwhile to discuss whether or not that a minor procedure called a watchman is a good idea for you or not.  I placed a referral to one of my colleagues that does those procedures and we will get you in for clinic to discuss.    Otherwise, continue your current blood pressure medications including carvedilol, amlodipine, spironolactone, and lisinopril.  If the blood pressure remains elevated, consistently, I do not particularly feeling anxious or having other reasons why your blood pressure will be high, it may be worthwhile to take the hydrochlorothiazide medication every day.  If this is the case, let us know.

## 2025-06-18 ENCOUNTER — LAB (OUTPATIENT)
Dept: LAB | Facility: HOSPITAL | Age: 60
End: 2025-06-18
Payer: COMMERCIAL

## 2025-06-18 ENCOUNTER — OFFICE VISIT (OUTPATIENT)
Dept: SPORTS MEDICINE | Facility: CLINIC | Age: 60
End: 2025-06-18
Payer: COMMERCIAL

## 2025-06-18 VITALS
SYSTOLIC BLOOD PRESSURE: 120 MMHG | BODY MASS INDEX: 28.11 KG/M2 | HEART RATE: 69 BPM | DIASTOLIC BLOOD PRESSURE: 80 MMHG | OXYGEN SATURATION: 98 % | RESPIRATION RATE: 16 BRPM | HEIGHT: 74 IN | WEIGHT: 219 LBS

## 2025-06-18 DIAGNOSIS — I10 ESSENTIAL HYPERTENSION: ICD-10-CM

## 2025-06-18 DIAGNOSIS — L81.9 PIGMENTED SKIN LESION OF SUSPECTED MALIGNANT NATURE: ICD-10-CM

## 2025-06-18 DIAGNOSIS — Z00.00 ENCOUNTER FOR ANNUAL PHYSICAL EXAM: Primary | ICD-10-CM

## 2025-06-18 DIAGNOSIS — Z12.5 SCREENING PSA (PROSTATE SPECIFIC ANTIGEN): ICD-10-CM

## 2025-06-18 DIAGNOSIS — E78.2 MIXED HYPERLIPIDEMIA: ICD-10-CM

## 2025-06-18 PROCEDURE — 99396 PREV VISIT EST AGE 40-64: CPT | Performed by: FAMILY MEDICINE

## 2025-06-18 NOTE — PROGRESS NOTES
"Flako KAVEH Coreas Sr. presents for an annual physical exam.         I have reviewed the patient's medical, family, and social history in detail and updated the computerized patient record.    Health Maintenance   Topic Date Due    Pneumococcal Vaccine 50+ (1 of 1 - PCV) Never done    ZOSTER VACCINE (1 of 2) Never done    PT PLAN OF CARE  03/03/2024    OT PLAN OF CARE  03/03/2024    SLP PLAN OF CARE  03/03/2024    COVID-19 Vaccine (5 - 2024-25 season) 09/01/2024    INFLUENZA VACCINE  07/01/2025    LIPID PANEL  01/26/2026    ANNUAL PHYSICAL  06/18/2026    COLORECTAL CANCER SCREENING  02/14/2029    TDAP/TD VACCINES (3 - Td or Tdap) 12/10/2033    HEPATITIS C SCREENING  Completed         /80 (BP Location: Left arm, Patient Position: Sitting, Cuff Size: Adult)   Pulse 69   Resp 16   Ht 188 cm (74.02\")   Wt 99.3 kg (219 lb)   SpO2 98%   BMI 28.11 kg/m²      Physical Exam    Vital signs reviewed.  General appearance: No acute distress  Eyes: conjunctiva clear without erythema; pupils equally round and reactive  ENT: external ears and nose normal; hearing normal   Neck: supple; no thyromegaly  CV: normal rate and rhythm; no peripheral edema  Respiratory: normal respiratory effort; lungs clear to auscultation bilaterally  MSK: normal gait and station; no focal joint deformity or swelling  Skin: no rash or wounds; normal turgor  Neuro: cranial nerves 2-12 grossly intact; normal sensation to light touch  Psych: mood and affect normal; recent and remote memory intact    No visits with results within 2 Week(s) from this visit.   Latest known visit with results is:   Admission on 05/21/2025, Discharged on 05/21/2025   Component Date Value Ref Range Status    QT Interval 05/21/2025 398  ms Final    QTC Interval 05/21/2025 463  ms Final    Glucose 05/21/2025 103 (H)  65 - 99 mg/dL Final    BUN 05/21/2025 25 (H)  6 - 20 mg/dL Final    Creatinine 05/21/2025 1.00  0.76 - 1.27 mg/dL Final    Sodium 05/21/2025 137  136 - 145 " mmol/L Final    Potassium 05/21/2025 4.0  3.5 - 5.2 mmol/L Final    Chloride 05/21/2025 103  98 - 107 mmol/L Final    CO2 05/21/2025 21.6 (L)  22.0 - 29.0 mmol/L Final    Calcium 05/21/2025 9.7  8.6 - 10.5 mg/dL Final    Total Protein 05/21/2025 7.6  6.0 - 8.5 g/dL Final    Albumin 05/21/2025 4.5  3.5 - 5.2 g/dL Final    ALT (SGPT) 05/21/2025 23  1 - 41 U/L Final    AST (SGOT) 05/21/2025 19  1 - 40 U/L Final    Alkaline Phosphatase 05/21/2025 79  39 - 117 U/L Final    Total Bilirubin 05/21/2025 0.5  0.0 - 1.2 mg/dL Final    Globulin 05/21/2025 3.1  gm/dL Final    A/G Ratio 05/21/2025 1.5  g/dL Final    BUN/Creatinine Ratio 05/21/2025 25.0  7.0 - 25.0 Final    Anion Gap 05/21/2025 12.4  5.0 - 15.0 mmol/L Final    eGFR 05/21/2025 87.8  >60.0 mL/min/1.73 Final    HS Troponin T 05/21/2025 8  <22 ng/L Final    proBNP 05/21/2025 220.0  0.0 - 900.0 pg/mL Final    Ethanol 05/21/2025 <10  0 - 10 mg/dL Final    Ethanol % 05/21/2025 <0.010  % Final    THC, Screen, Urine 05/21/2025 Positive (A)  Negative Final    Phencyclidine (PCP), Urine 05/21/2025 Negative  Negative Final    Cocaine Screen, Urine 05/21/2025 Negative  Negative Final    Methamphetamine, Ur 05/21/2025 Negative  Negative Final    Opiate Screen 05/21/2025 Negative  Negative Final    Amphetamine Screen, Urine 05/21/2025 Negative  Negative Final    Benzodiazepine Screen, Urine 05/21/2025 Negative  Negative Final    Tricyclic Antidepressants Screen 05/21/2025 Negative  Negative Final    Methadone Screen, Urine 05/21/2025 Negative  Negative Final    Barbiturates Screen, Urine 05/21/2025 Negative  Negative Final    Oxycodone Screen, Urine 05/21/2025 Negative  Negative Final    Buprenorphine, Screen, Urine 05/21/2025 Negative  Negative Final    WBC 05/21/2025 12.41 (H)  3.40 - 10.80 10*3/mm3 Final    RBC 05/21/2025 5.97 (H)  4.14 - 5.80 10*6/mm3 Final    Hemoglobin 05/21/2025 17.2  13.0 - 17.7 g/dL Final    Hematocrit 05/21/2025 52.7 (H)  37.5 - 51.0 % Final    MCV  05/21/2025 88.3  79.0 - 97.0 fL Final    MCH 05/21/2025 28.8  26.6 - 33.0 pg Final    MCHC 05/21/2025 32.6  31.5 - 35.7 g/dL Final    RDW 05/21/2025 12.8  12.3 - 15.4 % Final    RDW-SD 05/21/2025 41.4  37.0 - 54.0 fl Final    MPV 05/21/2025 10.4  6.0 - 12.0 fL Final    Platelets 05/21/2025 234  140 - 450 10*3/mm3 Final    Neutrophil % 05/21/2025 75.0  42.7 - 76.0 % Final    Lymphocyte % 05/21/2025 14.6 (L)  19.6 - 45.3 % Final    Monocyte % 05/21/2025 7.9  5.0 - 12.0 % Final    Eosinophil % 05/21/2025 1.8  0.3 - 6.2 % Final    Basophil % 05/21/2025 0.5  0.0 - 1.5 % Final    Immature Grans % 05/21/2025 0.2  0.0 - 0.5 % Final    Neutrophils, Absolute 05/21/2025 9.31 (H)  1.70 - 7.00 10*3/mm3 Final    Lymphocytes, Absolute 05/21/2025 1.81  0.70 - 3.10 10*3/mm3 Final    Monocytes, Absolute 05/21/2025 0.98 (H)  0.10 - 0.90 10*3/mm3 Final    Eosinophils, Absolute 05/21/2025 0.22  0.00 - 0.40 10*3/mm3 Final    Basophils, Absolute 05/21/2025 0.06  0.00 - 0.20 10*3/mm3 Final    Immature Grans, Absolute 05/21/2025 0.03  0.00 - 0.05 10*3/mm3 Final    nRBC 05/21/2025 0.0  0.0 - 0.2 /100 WBC Final    Fentanyl, Urine 05/21/2025 Negative  Negative Final        Diagnoses and all orders for this visit:    1. Encounter for annual physical exam (Primary)  -     Hemoglobin A1c  -     Lipid Panel    2. Essential hypertension  -     Hemoglobin A1c  -     Lipid Panel    3. Mixed hyperlipidemia  -     Hemoglobin A1c  -     Lipid Panel    4. Screening PSA (prostate specific antigen)  -     PSA Screen        Encourage healthy diet and exercise.  Encourage patient to stay up to date on screening examinations as indicated based on age and risk factors.

## 2025-06-18 NOTE — PROGRESS NOTES
"Flako is a 60 y.o. year old male presents to [facility]    Chief Complaint   Patient presents with    Annual Exam     CPE - fasting today        History of Present Illness  History of Present Illness  The patient presents for a physical exam.    Psychiatric History  - Last seen by psychiatrist on 06/16/2025  - Reports cariprazine induces fatigue but will continue treatment  - Temporarily discontinued clonidine  - Current medications: cariprazine, Viibryd, atorvastatin (added 06/17/2025)    Employment Status  - Actively seeking employment as a   - Uncertain about physical demand but determined to regain strength    Workout Recovery  - Experiencing difficulty recovering from workouts  - Seeking advice on joint health supplements  - Acknowledges protein deficiency, considering protein supplement    Skin Concerns  - Identified skin spots  - Suspects age-related or skin cancer  - Spots present for over a year, no flaking or bleeding    Cardiology  - Cardiologist planning Watchman procedure on 06/17/2025    Supplemental information: None    I have reviewed the patient's medical, family, and social history in detail and updated the computerized patient record.    /80 (BP Location: Left arm, Patient Position: Sitting, Cuff Size: Adult)   Pulse 69   Resp 16   Ht 188 cm (74.02\")   Wt 99.3 kg (219 lb)   SpO2 98%   BMI 28.11 kg/m²      Physical Exam    Vital signs reviewed.   General: No acute distress.  Eyes: conjunctiva clear; pupils equally round and reactive  ENT: external ears atraumatic  CV: no peripheral edema  Resp: normal respiratory effort, no use of accessory muscles  Skin: no rashes or wounds; normal turgor  Psych: mood and affect appropriate; recent and remote memory intact  Neuro: sensation to light touch intact    MSK Exam  Physical Exam  General: Comfortable, no distress.  Respiratory: Clear breath sounds bilaterally.  Cardiovascular: Normal heart sounds, " no murmurs.  Skin: Lesions on face, non-flaking, non-bleeding, present for over a year.                 Diagnoses and all orders for this visit:    1. Encounter for annual physical exam (Primary)  -     Hemoglobin A1c  -     Lipid Panel    2. Essential hypertension  -     Hemoglobin A1c  -     Lipid Panel    3. Mixed hyperlipidemia  -     Hemoglobin A1c  -     Lipid Panel    4. Screening PSA (prostate specific antigen)  -     PSA Screen    5. Pigmented skin lesion of suspected malignant nature  -     Ambulatory Referral to Dermatology        Assessment & Plan  1. Depression: Chronic. Neurologist believes depression is primary issue rather than residual stroke effects.  - Taking cariprazine (Vraylar) and Viibryd.  - Reports exhaustion from medication but will continue to evaluate effectiveness.  - Maintain current medication regimen.  - Follow up with psychiatrist as needed.    2. Joint health.  - Consider Osteo Bi-Flex for joint health (glucosamine, chondroitin).  - Incorporate protein supplements into diet, monitor sugar intake.    3. Skin spots: Present for over a year, no flaking or bleeding.  - Referral to dermatologist for evaluation of facial skin spots.  - Dermatologist may consider cryotherapy or other treatments.    4. Health maintenance.  - Blood pressure and pulse normal.  - Comprehensive lab workup today: PSA, hemoglobin A1c, cholesterol.  - Recent addition of atorvastatin by cardiologist.  - Maintain current health status.    Follow-up in 3 months.          Patient or patient representative verbalized consent for the use of Ambient Listening during the visit with  SIMRAN Rodriguez Jr.,  for chart documentation on 06/18/2025 at 09:31 EDT.

## 2025-06-23 ENCOUNTER — APPOINTMENT (OUTPATIENT)
Dept: CT IMAGING | Facility: HOSPITAL | Age: 60
End: 2025-06-23
Payer: COMMERCIAL

## 2025-06-23 ENCOUNTER — HOSPITAL ENCOUNTER (OUTPATIENT)
Facility: HOSPITAL | Age: 60
Setting detail: OBSERVATION
Discharge: HOME | End: 2025-06-26
Attending: EMERGENCY MEDICINE | Admitting: HOSPITALIST
Payer: COMMERCIAL

## 2025-06-23 ENCOUNTER — TELEPHONE (OUTPATIENT)
Dept: PSYCHIATRY | Facility: CLINIC | Age: 60
End: 2025-06-23
Payer: COMMERCIAL

## 2025-06-23 ENCOUNTER — APPOINTMENT (OUTPATIENT)
Dept: GENERAL RADIOLOGY | Facility: HOSPITAL | Age: 60
End: 2025-06-23
Payer: COMMERCIAL

## 2025-06-23 DIAGNOSIS — N17.9 AKI (ACUTE KIDNEY INJURY): ICD-10-CM

## 2025-06-23 DIAGNOSIS — S09.90XA CLOSED HEAD INJURY, INITIAL ENCOUNTER: ICD-10-CM

## 2025-06-23 DIAGNOSIS — R55 SYNCOPE AND COLLAPSE: Primary | ICD-10-CM

## 2025-06-23 LAB
ALBUMIN SERPL-MCNC: 4.3 G/DL (ref 3.5–5.2)
ALBUMIN/GLOB SERPL: 1.4 G/DL
ALP SERPL-CCNC: 72 U/L (ref 39–117)
ALT SERPL W P-5'-P-CCNC: 26 U/L (ref 1–41)
ANION GAP SERPL CALCULATED.3IONS-SCNC: 15.1 MMOL/L (ref 5–15)
AST SERPL-CCNC: 18 U/L (ref 1–40)
BASOPHILS # BLD AUTO: 0.06 10*3/MM3 (ref 0–0.2)
BASOPHILS NFR BLD AUTO: 0.5 % (ref 0–1.5)
BILIRUB SERPL-MCNC: 0.4 MG/DL (ref 0–1.2)
BUN SERPL-MCNC: 67 MG/DL (ref 6–20)
BUN/CREAT SERPL: 30.9 (ref 7–25)
CALCIUM SPEC-SCNC: 9.5 MG/DL (ref 8.6–10.5)
CHLORIDE SERPL-SCNC: 100 MMOL/L (ref 98–107)
CO2 SERPL-SCNC: 20.9 MMOL/L (ref 22–29)
CREAT SERPL-MCNC: 2.17 MG/DL (ref 0.76–1.27)
DEPRECATED RDW RBC AUTO: 40.3 FL (ref 37–54)
EGFRCR SERPLBLD CKD-EPI 2021: 34.4 ML/MIN/1.73
EOSINOPHIL # BLD AUTO: 0.7 10*3/MM3 (ref 0–0.4)
EOSINOPHIL NFR BLD AUTO: 5.4 % (ref 0.3–6.2)
ERYTHROCYTE [DISTWIDTH] IN BLOOD BY AUTOMATED COUNT: 13 % (ref 12.3–15.4)
GEN 5 1HR TROPONIN T REFLEX: 8 NG/L
GLOBULIN UR ELPH-MCNC: 3.1 GM/DL
GLUCOSE SERPL-MCNC: 135 MG/DL (ref 65–99)
HCT VFR BLD AUTO: 46.7 % (ref 37.5–51)
HGB BLD-MCNC: 15.4 G/DL (ref 13–17.7)
IMM GRANULOCYTES # BLD AUTO: 0.06 10*3/MM3 (ref 0–0.05)
IMM GRANULOCYTES NFR BLD AUTO: 0.5 % (ref 0–0.5)
LYMPHOCYTES # BLD AUTO: 2.78 10*3/MM3 (ref 0.7–3.1)
LYMPHOCYTES NFR BLD AUTO: 21.6 % (ref 19.6–45.3)
MCH RBC QN AUTO: 28.6 PG (ref 26.6–33)
MCHC RBC AUTO-ENTMCNC: 33 G/DL (ref 31.5–35.7)
MCV RBC AUTO: 86.8 FL (ref 79–97)
MONOCYTES # BLD AUTO: 1.02 10*3/MM3 (ref 0.1–0.9)
MONOCYTES NFR BLD AUTO: 7.9 % (ref 5–12)
NEUTROPHILS NFR BLD AUTO: 64.1 % (ref 42.7–76)
NEUTROPHILS NFR BLD AUTO: 8.25 10*3/MM3 (ref 1.7–7)
NRBC BLD AUTO-RTO: 0 /100 WBC (ref 0–0.2)
PLATELET # BLD AUTO: 307 10*3/MM3 (ref 140–450)
PMV BLD AUTO: 9.3 FL (ref 6–12)
POTASSIUM SERPL-SCNC: 4.6 MMOL/L (ref 3.5–5.2)
PROT SERPL-MCNC: 7.4 G/DL (ref 6–8.5)
RBC # BLD AUTO: 5.38 10*6/MM3 (ref 4.14–5.8)
SODIUM SERPL-SCNC: 136 MMOL/L (ref 136–145)
TROPONIN T NUMERIC DELTA: -1 NG/L
TROPONIN T SERPL HS-MCNC: 9 NG/L
WBC NRBC COR # BLD AUTO: 12.87 10*3/MM3 (ref 3.4–10.8)

## 2025-06-23 PROCEDURE — 93005 ELECTROCARDIOGRAM TRACING: CPT | Performed by: PHYSICIAN ASSISTANT

## 2025-06-23 PROCEDURE — 80053 COMPREHEN METABOLIC PANEL: CPT | Performed by: PHYSICIAN ASSISTANT

## 2025-06-23 PROCEDURE — 25810000003 SODIUM CHLORIDE 0.9 % SOLUTION: Performed by: PHYSICIAN ASSISTANT

## 2025-06-23 PROCEDURE — 93010 ELECTROCARDIOGRAM REPORT: CPT | Performed by: STUDENT IN AN ORGANIZED HEALTH CARE EDUCATION/TRAINING PROGRAM

## 2025-06-23 PROCEDURE — 82077 ASSAY SPEC XCP UR&BREATH IA: CPT | Performed by: NURSE PRACTITIONER

## 2025-06-23 PROCEDURE — 71045 X-RAY EXAM CHEST 1 VIEW: CPT

## 2025-06-23 PROCEDURE — 72125 CT NECK SPINE W/O DYE: CPT

## 2025-06-23 PROCEDURE — 36415 COLL VENOUS BLD VENIPUNCTURE: CPT

## 2025-06-23 PROCEDURE — G0378 HOSPITAL OBSERVATION PER HR: HCPCS

## 2025-06-23 PROCEDURE — 99285 EMERGENCY DEPT VISIT HI MDM: CPT

## 2025-06-23 PROCEDURE — 85025 COMPLETE CBC W/AUTO DIFF WBC: CPT | Performed by: PHYSICIAN ASSISTANT

## 2025-06-23 PROCEDURE — 84484 ASSAY OF TROPONIN QUANT: CPT | Performed by: PHYSICIAN ASSISTANT

## 2025-06-23 PROCEDURE — 70450 CT HEAD/BRAIN W/O DYE: CPT

## 2025-06-23 RX ORDER — SODIUM CHLORIDE 0.9 % (FLUSH) 0.9 %
10 SYRINGE (ML) INJECTION AS NEEDED
Status: DISCONTINUED | OUTPATIENT
Start: 2025-06-23 | End: 2025-06-26 | Stop reason: HOSPADM

## 2025-06-23 RX ADMIN — SODIUM CHLORIDE 1000 ML: 9 INJECTION, SOLUTION INTRAVENOUS at 21:26

## 2025-06-23 NOTE — TELEPHONE ENCOUNTER
Patient states he is having to many side effects to the mediations, vraylar and clonidine. Patient states he is not able to focus at all that he just walks back and back around his home. Patient states he is going to discontinue both medications and will discuss further at July 1st appointment. He just wanted to make sure you are made aware when you get back in office on Monday.

## 2025-06-23 NOTE — ED NOTES
Pt arrived via [facility] from home w/ c/o syncope episode around 1910. Pt reports hitting the R side of his head w/ +LOC. Pt reports taking his blood pressure around 1900 tonight & his systolic was in the 80's. Pt reports history of stroke & is not able to take a blood thinner due to ongoing falls & history of orthostatic hypotension. Pt reports an appointment on July 7th to consult of getting the watchmen's device.     Pt reports currently having a loop recorder.

## 2025-06-24 PROBLEM — R55 SYNCOPE: Status: ACTIVE | Noted: 2025-06-24

## 2025-06-24 PROBLEM — I95.1 ORTHOSTATIC HYPOTENSION: Status: ACTIVE | Noted: 2025-06-24

## 2025-06-24 LAB
ANION GAP SERPL CALCULATED.3IONS-SCNC: 10.8 MMOL/L (ref 5–15)
BUN SERPL-MCNC: 61 MG/DL (ref 6–20)
BUN/CREAT SERPL: 38.9 (ref 7–25)
CALCIUM SPEC-SCNC: 8.7 MG/DL (ref 8.6–10.5)
CHLORIDE SERPL-SCNC: 104 MMOL/L (ref 98–107)
CO2 SERPL-SCNC: 22.2 MMOL/L (ref 22–29)
CREAT SERPL-MCNC: 1.57 MG/DL (ref 0.76–1.27)
DEPRECATED RDW RBC AUTO: 41.3 FL (ref 37–54)
EGFRCR SERPLBLD CKD-EPI 2021: 50.8 ML/MIN/1.73
ERYTHROCYTE [DISTWIDTH] IN BLOOD BY AUTOMATED COUNT: 12.7 % (ref 12.3–15.4)
ETHANOL BLD-MCNC: <10 MG/DL (ref 0–10)
ETHANOL UR QL: <0.01 %
GLUCOSE SERPL-MCNC: 115 MG/DL (ref 65–99)
HCT VFR BLD AUTO: 42.1 % (ref 37.5–51)
HGB BLD-MCNC: 13.6 G/DL (ref 13–17.7)
MCH RBC QN AUTO: 28.7 PG (ref 26.6–33)
MCHC RBC AUTO-ENTMCNC: 32.3 G/DL (ref 31.5–35.7)
MCV RBC AUTO: 88.8 FL (ref 79–97)
PLATELET # BLD AUTO: 239 10*3/MM3 (ref 140–450)
PMV BLD AUTO: 9.7 FL (ref 6–12)
POTASSIUM SERPL-SCNC: 4.3 MMOL/L (ref 3.5–5.2)
QT INTERVAL: 429 MS
QTC INTERVAL: 437 MS
RBC # BLD AUTO: 4.74 10*6/MM3 (ref 4.14–5.8)
SODIUM SERPL-SCNC: 137 MMOL/L (ref 136–145)
WBC NRBC COR # BLD AUTO: 11.63 10*3/MM3 (ref 3.4–10.8)

## 2025-06-24 PROCEDURE — 25810000003 SODIUM CHLORIDE 0.9 % SOLUTION: Performed by: HOSPITALIST

## 2025-06-24 PROCEDURE — 96360 HYDRATION IV INFUSION INIT: CPT

## 2025-06-24 PROCEDURE — 96361 HYDRATE IV INFUSION ADD-ON: CPT

## 2025-06-24 PROCEDURE — 99214 OFFICE O/P EST MOD 30 MIN: CPT | Performed by: NURSE PRACTITIONER

## 2025-06-24 PROCEDURE — G0378 HOSPITAL OBSERVATION PER HR: HCPCS

## 2025-06-24 PROCEDURE — 80048 BASIC METABOLIC PNL TOTAL CA: CPT | Performed by: NURSE PRACTITIONER

## 2025-06-24 PROCEDURE — 85027 COMPLETE CBC AUTOMATED: CPT | Performed by: NURSE PRACTITIONER

## 2025-06-24 RX ORDER — SODIUM CHLORIDE 0.9 % (FLUSH) 0.9 %
10 SYRINGE (ML) INJECTION AS NEEDED
Status: DISCONTINUED | OUTPATIENT
Start: 2025-06-24 | End: 2025-06-26 | Stop reason: HOSPADM

## 2025-06-24 RX ORDER — CALCIUM CARBONATE 500 MG/1
2 TABLET, CHEWABLE ORAL 2 TIMES DAILY PRN
Status: DISCONTINUED | OUTPATIENT
Start: 2025-06-24 | End: 2025-06-26 | Stop reason: HOSPADM

## 2025-06-24 RX ORDER — POLYETHYLENE GLYCOL 3350 17 G/17G
17 POWDER, FOR SOLUTION ORAL DAILY PRN
Status: DISCONTINUED | OUTPATIENT
Start: 2025-06-24 | End: 2025-06-26 | Stop reason: HOSPADM

## 2025-06-24 RX ORDER — BUSPIRONE HYDROCHLORIDE 10 MG/1
10 TABLET ORAL EVERY 8 HOURS SCHEDULED
Status: DISCONTINUED | OUTPATIENT
Start: 2025-06-24 | End: 2025-06-26 | Stop reason: HOSPADM

## 2025-06-24 RX ORDER — BISACODYL 5 MG/1
5 TABLET, DELAYED RELEASE ORAL DAILY PRN
Status: DISCONTINUED | OUTPATIENT
Start: 2025-06-24 | End: 2025-06-26 | Stop reason: HOSPADM

## 2025-06-24 RX ORDER — ACETAMINOPHEN 325 MG/1
650 TABLET ORAL EVERY 4 HOURS PRN
Status: DISCONTINUED | OUTPATIENT
Start: 2025-06-24 | End: 2025-06-26 | Stop reason: HOSPADM

## 2025-06-24 RX ORDER — BISACODYL 10 MG
10 SUPPOSITORY, RECTAL RECTAL DAILY PRN
Status: DISCONTINUED | OUTPATIENT
Start: 2025-06-24 | End: 2025-06-26 | Stop reason: HOSPADM

## 2025-06-24 RX ORDER — ACETAMINOPHEN 160 MG/5ML
650 SOLUTION ORAL EVERY 4 HOURS PRN
Status: DISCONTINUED | OUTPATIENT
Start: 2025-06-24 | End: 2025-06-26 | Stop reason: HOSPADM

## 2025-06-24 RX ORDER — AMOXICILLIN 250 MG
2 CAPSULE ORAL 2 TIMES DAILY PRN
Status: DISCONTINUED | OUTPATIENT
Start: 2025-06-24 | End: 2025-06-26 | Stop reason: HOSPADM

## 2025-06-24 RX ORDER — PANTOPRAZOLE SODIUM 40 MG/1
40 TABLET, DELAYED RELEASE ORAL
Status: DISCONTINUED | OUTPATIENT
Start: 2025-06-24 | End: 2025-06-26 | Stop reason: HOSPADM

## 2025-06-24 RX ORDER — ASPIRIN 81 MG/1
81 TABLET, CHEWABLE ORAL DAILY
Status: DISCONTINUED | OUTPATIENT
Start: 2025-06-24 | End: 2025-06-26 | Stop reason: HOSPADM

## 2025-06-24 RX ORDER — SODIUM CHLORIDE 9 MG/ML
100 INJECTION, SOLUTION INTRAVENOUS CONTINUOUS
Status: ACTIVE | OUTPATIENT
Start: 2025-06-24 | End: 2025-06-25

## 2025-06-24 RX ORDER — SODIUM CHLORIDE 9 MG/ML
40 INJECTION, SOLUTION INTRAVENOUS AS NEEDED
Status: DISCONTINUED | OUTPATIENT
Start: 2025-06-24 | End: 2025-06-26 | Stop reason: HOSPADM

## 2025-06-24 RX ORDER — FOLIC ACID 1 MG/1
1 TABLET ORAL DAILY
Status: DISCONTINUED | OUTPATIENT
Start: 2025-06-24 | End: 2025-06-26 | Stop reason: HOSPADM

## 2025-06-24 RX ORDER — CARVEDILOL 12.5 MG/1
12.5 TABLET ORAL 2 TIMES DAILY WITH MEALS
Status: DISCONTINUED | OUTPATIENT
Start: 2025-06-24 | End: 2025-06-26 | Stop reason: HOSPADM

## 2025-06-24 RX ORDER — MULTIVITAMIN WITH IRON
500 TABLET ORAL DAILY
Status: DISCONTINUED | OUTPATIENT
Start: 2025-06-24 | End: 2025-06-26 | Stop reason: HOSPADM

## 2025-06-24 RX ORDER — VILAZODONE HYDROCHLORIDE 40 MG/1
40 TABLET ORAL DAILY
Status: DISCONTINUED | OUTPATIENT
Start: 2025-06-24 | End: 2025-06-26 | Stop reason: HOSPADM

## 2025-06-24 RX ORDER — ONDANSETRON 2 MG/ML
4 INJECTION INTRAMUSCULAR; INTRAVENOUS EVERY 6 HOURS PRN
Status: DISCONTINUED | OUTPATIENT
Start: 2025-06-24 | End: 2025-06-26 | Stop reason: HOSPADM

## 2025-06-24 RX ORDER — ACETAMINOPHEN 650 MG/1
650 SUPPOSITORY RECTAL EVERY 4 HOURS PRN
Status: DISCONTINUED | OUTPATIENT
Start: 2025-06-24 | End: 2025-06-26 | Stop reason: HOSPADM

## 2025-06-24 RX ORDER — ONDANSETRON 4 MG/1
4 TABLET, ORALLY DISINTEGRATING ORAL EVERY 6 HOURS PRN
Status: DISCONTINUED | OUTPATIENT
Start: 2025-06-24 | End: 2025-06-26 | Stop reason: HOSPADM

## 2025-06-24 RX ORDER — ATORVASTATIN CALCIUM 20 MG/1
40 TABLET, FILM COATED ORAL NIGHTLY
Status: DISCONTINUED | OUTPATIENT
Start: 2025-06-24 | End: 2025-06-26 | Stop reason: HOSPADM

## 2025-06-24 RX ORDER — SODIUM CHLORIDE 0.9 % (FLUSH) 0.9 %
10 SYRINGE (ML) INJECTION EVERY 12 HOURS SCHEDULED
Status: DISCONTINUED | OUTPATIENT
Start: 2025-06-24 | End: 2025-06-26 | Stop reason: HOSPADM

## 2025-06-24 RX ADMIN — SODIUM CHLORIDE 100 ML/HR: 9 INJECTION, SOLUTION INTRAVENOUS at 01:00

## 2025-06-24 RX ADMIN — PANTOPRAZOLE SODIUM 40 MG: 40 TABLET, DELAYED RELEASE ORAL at 09:36

## 2025-06-24 RX ADMIN — ASPIRIN 81 MG CHEWABLE TABLET 81 MG: 81 TABLET CHEWABLE at 09:34

## 2025-06-24 RX ADMIN — Medication 500 MCG: at 09:37

## 2025-06-24 RX ADMIN — CARVEDILOL 12.5 MG: 12.5 TABLET, FILM COATED ORAL at 09:34

## 2025-06-24 RX ADMIN — Medication 100 MG: at 09:37

## 2025-06-24 RX ADMIN — ATORVASTATIN CALCIUM 40 MG: 20 TABLET, FILM COATED ORAL at 20:47

## 2025-06-24 RX ADMIN — Medication 10 ML: at 05:44

## 2025-06-24 RX ADMIN — Medication 10 ML: at 09:30

## 2025-06-24 RX ADMIN — BUSPIRONE HYDROCHLORIDE 10 MG: 10 TABLET ORAL at 09:36

## 2025-06-24 RX ADMIN — CARVEDILOL 12.5 MG: 12.5 TABLET, FILM COATED ORAL at 18:16

## 2025-06-24 RX ADMIN — VILAZODONE HYDROCHLORIDE 40 MG: 40 TABLET, FILM COATED ORAL at 09:46

## 2025-06-24 RX ADMIN — BUSPIRONE HYDROCHLORIDE 10 MG: 10 TABLET ORAL at 20:47

## 2025-06-24 RX ADMIN — ACETAMINOPHEN 650 MG: 325 TABLET, FILM COATED ORAL at 14:22

## 2025-06-24 RX ADMIN — ACETAMINOPHEN 650 MG: 325 TABLET, FILM COATED ORAL at 09:45

## 2025-06-24 RX ADMIN — SODIUM CHLORIDE 100 ML/HR: 9 INJECTION, SOLUTION INTRAVENOUS at 12:37

## 2025-06-24 RX ADMIN — Medication 10 ML: at 20:48

## 2025-06-24 RX ADMIN — FOLIC ACID 1 MG: 1 TABLET ORAL at 09:34

## 2025-06-24 NOTE — PLAN OF CARE
Goal Outcome Evaluation:      Vitals stable, patient denies pain, pt verbalizes safety protocols

## 2025-06-24 NOTE — ED PROVIDER NOTES
"MD ATTESTATION NOTE    SHARED VISIT: This visit was performed by BOTH a physician and an APC. The substantive portion of the medical decision making was performed by this attesting physician who made or approved the management plan and takes responsibility for patient management. All studies in the APC note (if performed) were independently interpreted by me.     The DON and I have discussed this patient's history, physical exam, and treatment plan.  I have reviewed the documentation and affirm the documentation and agree with the treatment and plan.  The attached note describes my personal findings.      Independent Historians: Patient    A complete HPI/ROS/PMH/PSH/SH/FH are unobtainable due to: None    Chronic or social conditions impacting patient care (social determinants of health): None    Flako Coreas Sr. is a 60 y.o. male who presents to the ED c/o acute syncope.  Pt with BP low today 85/60 and he felt lightheaded. He tried laying cj for a while. When he got up he passed out.  He struck his entertainment center as he fell backward and his watch called 911 for him.  Pt with h/o recurrent sycnope and orthostatic hypotension but says his BP had been \"doing good\" for quite some time.  No med changes recently. No cp, no n/v/d.            On exam:  GENERAL: cooperative m lying supine in bed, conversant, alert, no acute distress  SKIN: Warm, dry, generalized pallor  HENT: Normocephalic, atraumatic  EYES: no scleral icterus  CV: regular rhythm, regular rate  RESPIRATORY: normal effort, lungs clear, no wheezing  NEURO: alert, moves all extremities, follows commands                                                             Labs  Recent Results (from the past 24 hours)   ECG 12 Lead Syncope    Collection Time: 06/23/25  8:27 PM   Result Value Ref Range    QT Interval 429 ms    QTC Interval 437 ms   Comprehensive Metabolic Panel    Collection Time: 06/23/25  8:29 PM    Specimen: Blood   Result Value Ref Range    " Glucose 135 (H) 65 - 99 mg/dL    BUN 67.0 (H) 6.0 - 20.0 mg/dL    Creatinine 2.17 (H) 0.76 - 1.27 mg/dL    Sodium 136 136 - 145 mmol/L    Potassium 4.6 3.5 - 5.2 mmol/L    Chloride 100 98 - 107 mmol/L    CO2 20.9 (L) 22.0 - 29.0 mmol/L    Calcium 9.5 8.6 - 10.5 mg/dL    Total Protein 7.4 6.0 - 8.5 g/dL    Albumin 4.3 3.5 - 5.2 g/dL    ALT (SGPT) 26 1 - 41 U/L    AST (SGOT) 18 1 - 40 U/L    Alkaline Phosphatase 72 39 - 117 U/L    Total Bilirubin 0.4 0.0 - 1.2 mg/dL    Globulin 3.1 gm/dL    A/G Ratio 1.4 g/dL    BUN/Creatinine Ratio 30.9 (H) 7.0 - 25.0    Anion Gap 15.1 (H) 5.0 - 15.0 mmol/L    eGFR 34.4 (L) >60.0 mL/min/1.73   High Sensitivity Troponin T    Collection Time: 06/23/25  8:29 PM    Specimen: Blood   Result Value Ref Range    HS Troponin T 9 <22 ng/L   CBC Auto Differential    Collection Time: 06/23/25  8:29 PM    Specimen: Blood   Result Value Ref Range    WBC 12.87 (H) 3.40 - 10.80 10*3/mm3    RBC 5.38 4.14 - 5.80 10*6/mm3    Hemoglobin 15.4 13.0 - 17.7 g/dL    Hematocrit 46.7 37.5 - 51.0 %    MCV 86.8 79.0 - 97.0 fL    MCH 28.6 26.6 - 33.0 pg    MCHC 33.0 31.5 - 35.7 g/dL    RDW 13.0 12.3 - 15.4 %    RDW-SD 40.3 37.0 - 54.0 fl    MPV 9.3 6.0 - 12.0 fL    Platelets 307 140 - 450 10*3/mm3    Neutrophil % 64.1 42.7 - 76.0 %    Lymphocyte % 21.6 19.6 - 45.3 %    Monocyte % 7.9 5.0 - 12.0 %    Eosinophil % 5.4 0.3 - 6.2 %    Basophil % 0.5 0.0 - 1.5 %    Immature Grans % 0.5 0.0 - 0.5 %    Neutrophils, Absolute 8.25 (H) 1.70 - 7.00 10*3/mm3    Lymphocytes, Absolute 2.78 0.70 - 3.10 10*3/mm3    Monocytes, Absolute 1.02 (H) 0.10 - 0.90 10*3/mm3    Eosinophils, Absolute 0.70 (H) 0.00 - 0.40 10*3/mm3    Basophils, Absolute 0.06 0.00 - 0.20 10*3/mm3    Immature Grans, Absolute 0.06 (H) 0.00 - 0.05 10*3/mm3    nRBC 0.0 0.0 - 0.2 /100 WBC   High Sensitivity Troponin T 1Hr    Collection Time: 06/23/25  9:25 PM    Specimen: Blood   Result Value Ref Range    HS Troponin T 8 <22 ng/L    Troponin T Numeric Delta -1  Abnormal if >/=3 ng/L   Ethanol    Collection Time: 06/23/25  9:25 PM    Specimen: Blood   Result Value Ref Range    Ethanol <10 0 - 10 mg/dL    Ethanol % <0.010 %       Radiology  CT Cervical Spine Without Contrast  Result Date: 6/23/2025  CT OF THE CERVICAL SPINE  HISTORY: Fall  COMPARISON: None available.  TECHNIQUE: Axial CT imaging was obtained through the cervical spine. Coronal and sagittal reformatted images were obtained.  FINDINGS: No acute fracture or subluxation of the cervical spine is seen. There is mild anterolisthesis of C4 on C5. There is retrolisthesis of C5 on C6. Intervertebral disc space narrowing is noted at multiple levels. There is no prevertebral soft tissue swelling. Images through the lung bases are clear. Canal stenosis is most significant at C6-C7 and C5-C6. Neural foraminal narrowing is noted at multiple levels, but is most significant at C5-C6 bilaterally, as well as C6-C7 on the left.      No acute fracture or subluxation identified.  Radiation dose reduction techniques were utilized, including automated exposure control and exposure modulation based on body size.   This report was finalized on 6/23/2025 9:49 PM by Dr. Harini Arreaga M.D on Workstation: [id number]      CT Head Without Contrast  Result Date: 6/23/2025  CT OF THE HEAD WITHOUT CONTRAST  HISTORY: Syncope  COMPARISON: February 23, 2025  TECHNIQUE: Axial CT imaging was obtained through the brain. No IV contrast was administered.  FINDINGS: No acute intracranial hemorrhage is seen. The ventricles are normal in size. There is no midline shift or mass effect. No calvarial fracture is seen. There is some mucosal thickening noted within the ethmoid sinuses. There is also some trace fluid within the left mastoid air cells. The patient does appear to have a midline parietal scalp hematoma.      No acute intracranial findings.  Radiation dose reduction techniques were utilized, including automated exposure control and exposure  modulation based on body size.   This report was finalized on 6/23/2025 9:46 PM by Dr. Harini Arreaga M.D on Workstation: [id number]      XR Chest 1 View  Result Date: 6/23/2025  SINGLE VIEW OF THE CHEST  HISTORY: Syncope  COMPARISON: May 21, 2025  FINDINGS: Heart size is within normal limits. No pneumothorax, pleural effusion, or acute infiltrate is seen. There is a cardiac loop recorder.      No acute findings.  This report was finalized on 6/23/2025 9:17 PM by Dr. Harini Arreaga M.D on Workstation: [email or web address]        Medical Decision Making:  ED Course as of 06/24/25 0340   Mon Jun 23, 2025 2033 EKG ER MD interpretation   Time: 8: 27 PM  Rhythm and rate: Sinus rhythm at a rate of 62  Axis: Normal  P waves: Normal  QRS complexes: Normal  ST segments: <1mm elevation lead I and V6  T waves: no flattening or inversions  Q waves in lead III and aVF  Comparison EKG is from May 21, 2025 [AR]   2038 WBC(!): 12.87 [MP]   2038 Hemoglobin: 15.4 [MP]   2112 BUN(!): 67.0 [MP]   2112 Creatinine(!): 2.17 [MP]   2301 I spoke with Steffen with [facility].  Discussed patient presentation and ED findings.  She agrees admit patient to a telemetry observation bed to the service of Dr. Hester [MP]      ED Course User Index  [AR] Ijeoma Devries MD  [MP] Jeane Ramirez PA-C       The differential diagnosis for syncope, collapse, or even near syncope/lightheadedness is quite broad and includes but is not limited to: hypoglycemia, orthostasis, vasovagal syncope, seizure, cardiac syncope, PE, electrolyte disturbances, renal failure, DKA, profound anemia, aortic dissection, severe aortic stenosis, stroke, sah, gi bleeding, intoxication and medication effects.    This patient presents with syncope or near-syncope. Seizure is less likely given the history and exam (lack of postictal period). No neurologic deficits on exam indicating a stroke, and no signs of head trauma or injury. Given no signs of trauma or neurologic  deficit, I will withhold further imaging of the head per [facility]P Choosing Wisely Recommendations. Additionally, the [facility] clinical policy on syncope evaluation recommends laboratory testing and advanced investigative testing such as echocardiography or head CT scanning need not be routinely performed unless guided by specific findings in the history or physical examination.        [place]fe Syncope Risk score (for 30 day events): 3 Applicable to patients =16 years old presenting =24 hours of syncope. Do not use if: prolonged (>5 min) LOC, change in mental status from baseline, obvious witnessed seizure, major trauma, intoxication, language barrier, or head trauma causing LOC. This calculator is externally validated, according to data presented at the [employer] for [employer] Annual Meeting 2018.  Nine measures:  Vasovagal symptom predisposition (Triggered by being in a warm crowded place, prolonged standing, fear, emotion, or pain) (-1)  Heart disease history CAD, atrial fibrillation or flutter, CHF, valvular disease (+1)  Any systolic Blood Pressure reading <90 mmHg or >180 mmHg (+2)  Troponin elevated (+2)  QRS Axis abnormal (<30 or >100 degrees) (+1)  QRS Duration >130 ms (+1)  QTc >480 ms (+2)  ED diagnosis based on eval:   Vasovagal Syncope (-2)  Cardiac Syncope (+2)  (Neither (0))  ___________________________________  Score -3 to 0: Very low risk (serious adverse events <=1.9% in 30 days)  Score 1 to 3: Medium risk (serious adverse events >=3% in 30 days)  Score 4 to 5: High risk  Score >=6: Very high risk    Procedures:  Procedures        PPE: I followed hospital protocols for proper PPE based on patient presentation including use of N95 mask for suspected infectious respiratory conditions.  Proper hand hygiene was performed both before and after the patient encounter.          Diagnosis  Final diagnoses:   Syncope and collapse   ALLISON (acute kidney injury)   Closed head injury, initial encounter        Note Disclaimer: At [facility], we believe that sharing information builds trust and better relationships. You are receiving this note because you recently visited [facility]. It is possible you will see health information before a provider has talked with you about it. This kind of information can be easy to misunderstand. To help you fully understand what it means for your health, we urge you to discuss this note with your provider.       Ijeoma Devries MD  06/24/25 1111

## 2025-06-24 NOTE — ED PROVIDER NOTES
EMERGENCY DEPARTMENT ENCOUNTER  Room Number:  38/38  PCP: Akash Rodriguez Jr., DO  Independent Historians: Historian: Patient and EMS      HPI:  Chief Complaint: had concerns including Syncope.     A complete HPI/ROS/PMH/PSH/SH/FH are unobtainable due to: EM_Unobtainable History : None    Chronic or social conditions impacting patient care (Social Determinants of Health): None      Context: Flako Coreas Sr. is a 60 y.o. male with a medical history of anxiety, depression, hyperlipidemia, hypertension, GERD, CVA, alcohol abuse, ataxia, paroxysmal atrial fibrillation who presents to the ED c/o acute syncope.  Patient reports he noticed his blood pressure was dropping lower throughout the day.  By 1500 he his blood pressure was 85/60.  Reports he remembers laying down on his kitchen floor.  He stood up later and lost consciousness.  Reports he fell backwards into the entertainment center.  He recalls vomiting.  Reports his Apple Watch called 911 after he passed out.  Reports he has had issues with recurrent syncope and is established with Dr. Taylor with cardiology.  He has had prior CVA, but is not anticoagulated due to to recurrent syncope.  He is scheduled for Watchman procedure.  Patient denies neck pain or back pain.  Denies preceding chest pain or palpitations.  Reports he went for a walk this morning and has been drinking plenty of fluids.  No other systemic complaints at this time.      Review of prior external notes (non-ED) -and- Review of prior external test results outside of this encounter: Patient seen in office by sports medicine on 6/18/2025 for annual physical exam.  Reviewed assessment and plan.  Will check screening labs and refer patient to dermatology. Reviewed labs collected on 5/21/2025.  CBC with hemoglobin 17.2, CMP with creatinine 1.00.    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 10/23/2023    History of vertebral artery stenosis 10/25/2023    Alcohol abuse 10/28/2023    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    Anxiety about health 02/26/2024    Vertebral artery stenosis, left 02/26/2024    Memory loss 02/26/2024    History of alcohol dependence 02/26/2024    Elevated lipoprotein(a) 04/03/2024    Elevated coronary artery calcium score 04/03/2024    PAF (paroxysmal atrial fibrillation) 07/03/2024    Alcohol use 01/25/2025    Mood disorder 01/25/2025    Alcohol withdrawal 01/27/2025    History of CVA (cerebrovascular accident) 02/23/2025    Peptic ulcer disease 02/23/2025    History of diverticulosis 02/23/2025    Implantable loop recorder present 03/12/2025     Resolved Ambulatory Problems     Diagnosis Date Noted    Abdominal pain 06/20/2018    Generalized abdominal pain 12/08/2022    History of esophagitis 12/08/2022    Gastrointestinal hemorrhage 12/08/2022    Acute CVA (cerebrovascular accident) 08/17/2023    Hypertensive emergency 08/19/2023    Ataxia 08/19/2023    CVA (cerebral vascular accident) 08/22/2023    Acute CVA (cerebrovascular accident) 10/23/2023    HTN (hypertension) 10/24/2023    TIA (transient ischemic attack) 10/26/2023    Concussion 10/27/2023    Fall 10/27/2023    Head injury, closed, with concussion 10/27/2023    CHI (closed head injury), initial encounter 12/10/2023    Recurrent syncope 01/19/2024    Closed head injury 02/12/2024    Hospital discharge follow-up 02/26/2024    Dizziness 02/26/2024    Vasovagal syncope 03/15/2024    ALLISON (acute kidney injury)  03/15/2024    Syncope 01/25/2025    Hypertensive emergency 01/25/2025    History of atrial fibrillation 01/25/2025    Hypertensive urgency 02/23/2025    Hyperglycemia 02/23/2025     Past Medical History:   Diagnosis Date    ADHD (attention deficit hyperactivity disorder)     Anxiety     Benign prostatic hyperplasia Surgery in 12/2021    Brain concussion 11/2023    Clotting disorder Intestinal    Depression     Diverticulitis     Duodenitis     Enteritis     Failure to thrive (child)     Family history of blood clots     Fracture of wrist 1985    Fracture, foot 2019    Hypertension     Irritable bowel syndrome 2017    Low testosterone     Microcytosis     Pancreatitis     Pneumonia     PONV (postoperative nausea and vomiting)     Seasonal affective disorder     Shoulder injury     Stroke     Unexplained weight loss     Visual impairment 8/2023         PAST SURGICAL HISTORY  Past Surgical History:   Procedure Laterality Date    ABDOMINAL SURGERY      CARDIAC ELECTROPHYSIOLOGY PROCEDURE N/A 01/24/2024    Procedure: Loop insertion- [employer] LINQ;  Surgeon: Kaela Walker MD;  Location: [facility] CATH INVASIVE LOCATION;  Service: Cardiovascular;  Laterality: N/A;    COLON SURGERY      COLONOSCOPY      COLONOSCOPY N/A 12/11/2022    Procedure: COLONOSCOPY INTO CECUM WITH HOT SNARE POLYPECTOMY;  Surgeon: Albert Gallo MD;  Location: [facility] ENDOSCOPY;  Service: Gastroenterology;  Laterality: N/A;  PRE- GI BLEED  POST- DIVERTICULOSIS, POLYP    COLONOSCOPY N/A 02/14/2024    Procedure: COLONOSCOPY into cecum/terminal ileum;  Surgeon: Albert Gallo MD;  Location: [facility] ENDOSCOPY;  Service: Gastroenterology;  Laterality: N/A;  diverticulosis, hemorrhoids    ENDOSCOPY N/A 12/10/2022    Procedure: ESOPHAGOGASTRODUODENOSCOPY;  Surgeon: Albert Gallo MD;  Location: [facility] ENDOSCOPY;  Service: Gastroenterology;  Laterality: N/A;  PRE- DARK STOOLS  POST- BARRETTS ESOPHAGUS    ENDOSCOPY N/A 02/14/2024    Procedure:  ESOPHAGOGASTRODUODENOSCOPY with biopsy;  Surgeon: Albert Gallo MD;  Location: [facility] ENDOSCOPY;  Service: Gastroenterology;  Laterality: N/A;  long segment wilson's, hiatal hernia    FRACTURE SURGERY Right 1980    for broken arm    FRACTURE SURGERY Right     for broken hand    GASTRECTOMY      HAND SURGERY  1990    INCISION AND DRAINAGE ABSCESS  2015    anal    JOINT REPLACEMENT  2022    Rebuild shoulder    PROSTATE SURGERY      SHOULDER SURGERY  2022    SKIN BIOPSY      SMALL INTESTINE SURGERY      TONSILLECTOMY      TRIGGER POINT INJECTION  2017         FAMILY HISTORY  Family History   Problem Relation Age of Onset    Stroke Mother     Heart disease Mother     Stroke Father     Hyperlipidemia Father     Hypertension Father          SOCIAL HISTORY  Social History     Socioeconomic History    Marital status:    Tobacco Use    Smoking status: Never     Passive exposure: Never    Smokeless tobacco: Never   Vaping Use    Vaping status: Never Used   Substance and Sexual Activity    Alcohol use: Not Currently    Drug use: Not Currently     Frequency: 1.0 times per week    Sexual activity: Defer     Partners: Female     Birth control/protection: Other, Birth control pill         ALLERGIES  Patient has no known allergies.      REVIEW OF SYSTEMS  Included in HPI  All systems reviewed and negative except for those discussed in HPI.      PHYSICAL EXAM    I have reviewed the triage vital signs and nursing notes.    ED Triage Vitals [06/23/25 2005]   Temp Heart Rate Resp BP SpO2   98.2 °F (36.8 °C) 68 18 109/71 95 %      Temp src Heart Rate Source Patient Position BP Location FiO2 (%)   Oral -- Sitting Right arm --       Physical Exam  Constitutional:       General: He is not in acute distress.     Appearance: Normal appearance.   HENT:      Head: Normocephalic and atraumatic.      Nose: Nose normal.      Mouth/Throat:      Mouth: Mucous membranes are moist.   Eyes:      Conjunctiva/sclera: Conjunctivae normal.       Pupils: Pupils are equal, round, and reactive to light.   Cardiovascular:      Rate and Rhythm: Normal rate and regular rhythm.      Pulses: Normal pulses.      Heart sounds: Normal heart sounds.      Comments: Distal pulses intact  Pulmonary:      Effort: Pulmonary effort is normal.      Breath sounds: Normal breath sounds.   Abdominal:      General: There is no distension.   Musculoskeletal:         General: Normal range of motion.      Cervical back: Normal range of motion and neck supple.   Skin:     General: Skin is warm.      Capillary Refill: Capillary refill takes less than 2 seconds.   Neurological:      General: No focal deficit present.      Mental Status: He is alert and oriented to person, place, and time.   Psychiatric:         Mood and Affect: Mood normal.             LAB RESULTS  Recent Results (from the past 24 hours)   ECG 12 Lead Syncope    Collection Time: 06/23/25  8:27 PM   Result Value Ref Range    QT Interval 429 ms    QTC Interval 437 ms   CBC Auto Differential    Collection Time: 06/23/25  8:29 PM    Specimen: Blood   Result Value Ref Range    WBC 12.87 (H) 3.40 - 10.80 10*3/mm3    RBC 5.38 4.14 - 5.80 10*6/mm3    Hemoglobin 15.4 13.0 - 17.7 g/dL    Hematocrit 46.7 37.5 - 51.0 %    MCV 86.8 79.0 - 97.0 fL    MCH 28.6 26.6 - 33.0 pg    MCHC 33.0 31.5 - 35.7 g/dL    RDW 13.0 12.3 - 15.4 %    RDW-SD 40.3 37.0 - 54.0 fl    MPV 9.3 6.0 - 12.0 fL    Platelets 307 140 - 450 10*3/mm3    Neutrophil % 64.1 42.7 - 76.0 %    Lymphocyte % 21.6 19.6 - 45.3 %    Monocyte % 7.9 5.0 - 12.0 %    Eosinophil % 5.4 0.3 - 6.2 %    Basophil % 0.5 0.0 - 1.5 %    Immature Grans % 0.5 0.0 - 0.5 %    Neutrophils, Absolute 8.25 (H) 1.70 - 7.00 10*3/mm3    Lymphocytes, Absolute 2.78 0.70 - 3.10 10*3/mm3    Monocytes, Absolute 1.02 (H) 0.10 - 0.90 10*3/mm3    Eosinophils, Absolute 0.70 (H) 0.00 - 0.40 10*3/mm3    Basophils, Absolute 0.06 0.00 - 0.20 10*3/mm3    Immature Grans, Absolute 0.06 (H) 0.00 - 0.05 10*3/mm3     nRBC 0.0 0.0 - 0.2 /100 WBC         RADIOLOGY  CT Cervical Spine Without Contrast  Result Date: 6/23/2025  CT OF THE CERVICAL SPINE  HISTORY: Fall  COMPARISON: None available.  TECHNIQUE: Axial CT imaging was obtained through the cervical spine. Coronal and sagittal reformatted images were obtained.  FINDINGS: No acute fracture or subluxation of the cervical spine is seen. There is mild anterolisthesis of C4 on C5. There is retrolisthesis of C5 on C6. Intervertebral disc space narrowing is noted at multiple levels. There is no prevertebral soft tissue swelling. Images through the lung bases are clear. Canal stenosis is most significant at C6-C7 and C5-C6. Neural foraminal narrowing is noted at multiple levels, but is most significant at C5-C6 bilaterally, as well as C6-C7 on the left.      No acute fracture or subluxation identified.  Radiation dose reduction techniques were utilized, including automated exposure control and exposure modulation based on body size.   This report was finalized on 6/23/2025 9:49 PM by Dr. Harini Arreaga M.D on Workstation: [id number]      CT Head Without Contrast  Result Date: 6/23/2025  CT OF THE HEAD WITHOUT CONTRAST  HISTORY: Syncope  COMPARISON: February 23, 2025  TECHNIQUE: Axial CT imaging was obtained through the brain. No IV contrast was administered.  FINDINGS: No acute intracranial hemorrhage is seen. The ventricles are normal in size. There is no midline shift or mass effect. No calvarial fracture is seen. There is some mucosal thickening noted within the ethmoid sinuses. There is also some trace fluid within the left mastoid air cells. The patient does appear to have a midline parietal scalp hematoma.      No acute intracranial findings.  Radiation dose reduction techniques were utilized, including automated exposure control and exposure modulation based on body size.   This report was finalized on 6/23/2025 9:46 PM by Dr. Harini Arreaga M.D on Workstation:  [id number]      XR Chest 1 View  Result Date: 6/23/2025  SINGLE VIEW OF THE CHEST  HISTORY: Syncope  COMPARISON: May 21, 2025  FINDINGS: Heart size is within normal limits. No pneumothorax, pleural effusion, or acute infiltrate is seen. There is a cardiac loop recorder.      No acute findings.  This report was finalized on 6/23/2025 9:17 PM by Dr. Harini Arreaga M.D on Workstation: [id number]          MEDICATIONS GIVEN IN ER  Medications   sodium chloride 0.9 % flush 10 mL (has no administration in time range)         ORDERS PLACED DURING THIS VISIT:  Orders Placed This Encounter   Procedures    XR Chest 1 View    CT Head Without Contrast    CT Cervical Spine Without Contrast    Comprehensive Metabolic Panel    High Sensitivity Troponin T    CBC Auto Differential    Orthostatic Vitals    ECG 12 Lead Syncope    Insert Peripheral IV    CBC & Differential         OUTPATIENT MEDICATION MANAGEMENT:  Current Facility-Administered Medications Ordered in Epic   Medication Dose Route Frequency Provider Last Rate Last Admin    sodium chloride 0.9 % flush 10 mL  10 mL Intravenous PRN Jeane Ramirez PA-C         Current Outpatient Medications Ordered in Epic   Medication Sig Dispense Refill    amLODIPine (NORVASC) 10 MG tablet Take 1 tablet by mouth Daily. 90 tablet 3    aspirin 81 MG chewable tablet Chew 1 tablet Daily.      atorvastatin (LIPITOR) 40 MG tablet Take 1 tablet by mouth Every Night. 90 tablet 3    busPIRone (BUSPAR) 10 MG tablet Take 1 tablet by mouth Every 8 (Eight) Hours. 90 tablet 0    Cariprazine HCl (Vraylar) 1.5 MG capsule capsule Take 1 capsule by mouth Daily. 30 capsule 0    carvedilol (COREG) 12.5 MG tablet Take 1 tablet by mouth 2 (Two) Times a Day With Meals. 180 tablet 3    cloNIDine (CATAPRES) 0.1 MG tablet Take 1 tablet by mouth Daily As Needed (anxiety). 30 tablet 0    folic acid (FOLVITE) 1 MG tablet Take 1 tablet by mouth Daily. 30 tablet 0    lisinopril (PRINIVIL,ZESTRIL) 40 MG tablet  Take 1 tablet by mouth Daily. 90 tablet 3    pantoprazole (PROTONIX) 40 MG EC tablet Take 1 tablet by mouth Every Morning Before Breakfast. 30 tablet 0    spironolactone (ALDACTONE) 25 MG tablet Take 1 tablet by mouth Daily. 90 tablet 3    thiamine (VITAMIN B1) 100 MG tablet Take 1 tablet by mouth Daily. 30 tablet 0    vilazodone (Viibryd) 40 MG tablet tablet Take 1 tablet by mouth Daily. 30 tablet 6    vitamin B-12 (CYANOCOBALAMIN) 500 MCG tablet Take 1 tablet by mouth Daily. 90 tablet 0           PROGRESS, DATA ANALYSIS, CONSULTS, AND MEDICAL DECISION MAKING  All labs have been independently interpreted by me.  All radiology studies have been reviewed by me. All EKG's have been independently viewed and interpreted by me.  Discussion below represents my analysis of pertinent findings related to patient's condition, differential diagnosis, treatment plan and final disposition.    Differential diagnosis includes but is not limited to orthostatic syncope, vasovagal syncope, cardiac arrhythmia.        ED Course as of 06/23/25 2322 Mon Jun 23, 2025 2033 EKG ER MD interpretation   Time: 8: 27 PM  Rhythm and rate: Sinus rhythm at a rate of 62  Axis: Normal  P waves: Normal  QRS complexes: Normal  ST segments: <1mm elevation lead I and V6  T waves: no flattening or inversions  Q waves in lead III and aVF  Comparison EKG is from May 21, 2025 [AR]   2038 WBC(!): 12.87 [MP]   2038 Hemoglobin: 15.4 [MP]   2112 BUN(!): 67.0 [MP]   2112 Creatinine(!): 2.17 [MP]   2301 I spoke with Steffen with [facility].  Discussed patient presentation and ED findings.  She agrees admit patient to a telemetry observation bed to the service of Dr. Hester [MP]      ED Course User Index  [AR] Ijeoma Devries MD  [MP] Jeane Ramirez PA-C             AS OF 20:58 EDT VITALS:    BP - 109/71  HR - 68  TEMP - 98.2 °F (36.8 °C) (Oral)  O2 SATS - 95%    COMPLEXITY OF CARE  The patient requires admission.      DIAGNOSIS  Final diagnoses:   Syncope  and collapse   ALLISON (acute kidney injury)   Closed head injury, initial encounter         DISPOSITION  ED Disposition       ED Disposition   Decision to Admit    Condition   --    Comment   Level of Care: Telemetry [5]   Diagnosis: ALLISON (acute kidney injury) [[id number]]   Admitting Physician: LUZ ELENA DELGADO [[id number]]   Attending Physician: LUZ ELENA DELGADO [[id number]]   Is patient appropriate for Inpatient Observation Unit?: No [0]                  Please note that portions of this document were completed with a voice recognition program.    Note Disclaimer: At [facility], we believe that sharing information builds trust and better relationships. You are receiving this note because you recently visited [facility]. It is possible you will see health information before a provider has talked with you about it. This kind of information can be easy to misunderstand. To help you fully understand what it means for your health, we urge you to discuss this note with your provider.     Jeane Ramirez PA-C  06/23/25 [id number]

## 2025-06-24 NOTE — CASE MANAGEMENT/SOCIAL WORK
Discharge Planning Assessment  [facility]     Patient Name: Flako Coreas Sr.  MRN: [id number]  Today's Date: 6/24/2025    Admit Date: 6/23/2025    Plan: Home, family to transport   Discharge Needs Assessment       Row Name 06/24/25 1239       Living Environment    People in Home alone    Current Living Arrangements home    Potentially Unsafe Housing Conditions none    In the past 12 months has the electric, gas, oil, or water company threatened to shut off services in your home? No    Provides Primary Care For no one    Family Caregiver if Needed none    Quality of Family Relationships unable to assess    Able to Return to Prior Arrangements no       Resource/Environmental Concerns    Resource/Environmental Concerns none    Transportation Concerns none       Transportation Needs    In the past 12 months, has lack of transportation kept you from medical appointments or from getting medications? no    In the past 12 months, has lack of transportation kept you from meetings, work, or from getting things needed for daily living? No       Food Insecurity    Within the past 12 months, you worried that your food would run out before you got the money to buy more. Never true    Within the past 12 months, the food you bought just didn't last and you didn't have money to get more. Never true       Transition Planning    Patient/Family Anticipates Transition to home    Patient/Family Anticipated Services at Transition none    Transportation Anticipated family or friend will provide       Discharge Needs Assessment    Equipment Currently Used at Home none    Concerns to be Addressed no discharge needs identified;denies needs/concerns at this time    Do you want help finding or keeping work or a job? I do not need or want help    Do you want help with school or training? For example, starting or completing job training or getting a high school diploma, GED or equivalent No    Anticipated Changes Related to Illness none     Equipment Needed After Discharge none                   Discharge Plan       Row Name 06/24/25 3165       Plan    Plan Home, family to transport    Patient/Family in Agreement with Plan yes    Plan Comments Met with pt at bedside. Permission obtained to speak to pt in front of family. Introduced self and explained role of . Face sheet verified, PCP is Akash Rodriguez. Pt denies difficulty paying for medications, pt obtains his medications from [facility]. Pt lives alone, but stated that friends/family could assist with any care needs that may arise. Pt is independent in ADL's and does not use any assistive devices, although he has them available. Pt has never had home health, but he has been to [facility] in the past. He anticipates returning home at discharge without any services. Upon discharge, family will transport home. Explained that CCP would follow to assess for discharge needs.                    Expected Discharge Date and Time       Expected Discharge Date Expected Discharge Time    Jun 25, 2025            Demographic Summary    No documentation.                  Functional Status    No documentation.                  Psychosocial    No documentation.                  Abuse/Neglect    No documentation.                  Legal    No documentation.                  Substance Abuse    No documentation.                  Patient Forms    No documentation.                     Aubrie Early, RN

## 2025-06-24 NOTE — H&P
HISTORY AND PHYSICAL   [facility]        Date of Admission: 2025  Patient Identification:  Name: Flako Coreas Sr.  Age: 60 y.o.  Sex: male  :  1965  MRN: [id number]                     Primary Care Physician: Akash Rodriguez Jr., DO    Chief Complaint: Passed out    History of Present Illness:   Mr. Coreas is a wonderfully pleasant 60-year-old male who unfortunately does have a plethora of past medical histories for such a young gentleman but he has well-known orthostatic hypotension and he understands precautions as well.  He does maintain on diuretics as well as an ACE and he has been taking his medications as prescribed.  He states his urine output has been normal and he has not noted any concentration.  We have had recent increase in heat and humidity and he has been ambulating walking multiple miles a day and ultimately had a syncopal episode upon standing.  He has a loop recorder in place and cardiology consult is pending and I am curious to see if that reveals anything new.  He denies any preceding issues fever chills or night sweats.  He is denying any blood/lacerations versus joint pain.  He is a little sore in the back of his head but everything appears intact.  His speech is fluent and he is conversational and pleasant.  He ultimately is feeling better as he has IVF running at this current time.  He does have upcoming plans for watchman's procedure and Dr. Taylor is his normal cardiologist with that consult pending.  He thought his fluid intake was being kept up enough and had a hard time believing that he is dehydrated with mild ARF -he was counseled regarding humidity and current excessive heat.    Past Medical History:  Past Medical History:   Diagnosis Date    ADHD (attention deficit hyperactivity disorder)     On going issue    Alcohol abuse     Anxiety     Lepe's esophagus     Benign prostatic hyperplasia Surgery in 2021    Brain concussion 2023     Clotting disorder Intestinal    Depression     Diverticulitis     Diverticulosis     Duodenitis     Enteritis     Failure to thrive (child)     Family history of blood clots     Fracture of wrist 1985    Fracture, foot 2019    GERD (gastroesophageal reflux disease)     Hyperlipidemia     Hypertension     Hypertensive emergency     Implantable loop recorder present 03/12/2025    Irritable bowel syndrome 2017    Low testosterone     Microcytosis     Pancreatitis     Peptic ulcer disease 02/23/2025    Pneumonia     PONV (postoperative nausea and vomiting)     Seasonal affective disorder     Shoulder injury     Stroke     Unexplained weight loss     Visual impairment 8/2023     Past Surgical History:  Past Surgical History:   Procedure Laterality Date    ABDOMINAL SURGERY      CARDIAC ELECTROPHYSIOLOGY PROCEDURE N/A 01/24/2024    Procedure: Loop insertion- [employer] LINQ;  Surgeon: Kaela Walker MD;  Location: [facility] CATH INVASIVE LOCATION;  Service: Cardiovascular;  Laterality: N/A;    COLON SURGERY      COLONOSCOPY      COLONOSCOPY N/A 12/11/2022    Procedure: COLONOSCOPY INTO CECUM WITH HOT SNARE POLYPECTOMY;  Surgeon: Albert Gallo MD;  Location: [facility] ENDOSCOPY;  Service: Gastroenterology;  Laterality: N/A;  PRE- GI BLEED  POST- DIVERTICULOSIS, POLYP    COLONOSCOPY N/A 02/14/2024    Procedure: COLONOSCOPY into cecum/terminal ileum;  Surgeon: Albert Gallo MD;  Location: [facility] ENDOSCOPY;  Service: Gastroenterology;  Laterality: N/A;  diverticulosis, hemorrhoids    ENDOSCOPY N/A 12/10/2022    Procedure: ESOPHAGOGASTRODUODENOSCOPY;  Surgeon: Albert Gallo MD;  Location: [facility] ENDOSCOPY;  Service: Gastroenterology;  Laterality: N/A;  PRE- DARK STOOLS  POST- BARRETTS ESOPHAGUS    ENDOSCOPY N/A 02/14/2024    Procedure: ESOPHAGOGASTRODUODENOSCOPY with biopsy;  Surgeon: Albert Gallo MD;  Location: [facility] ENDOSCOPY;  Service: Gastroenterology;  Laterality: N/A;  long segment wilson's, hiatal hernia     FRACTURE SURGERY Right 1980    for broken arm    FRACTURE SURGERY Right     for broken hand    GASTRECTOMY      HAND SURGERY  1990    INCISION AND DRAINAGE ABSCESS  2015    anal    JOINT REPLACEMENT  2022    Rebuild shoulder    PROSTATE SURGERY      SHOULDER SURGERY  2022    SKIN BIOPSY      SMALL INTESTINE SURGERY      TONSILLECTOMY      TRIGGER POINT INJECTION  2017      Home Meds:  Medications Prior to Admission   Medication Sig Dispense Refill Last Dose/Taking    amLODIPine (NORVASC) 10 MG tablet Take 1 tablet by mouth Daily. 90 tablet 3     aspirin 81 MG chewable tablet Chew 1 tablet Daily.       atorvastatin (LIPITOR) 40 MG tablet Take 1 tablet by mouth Every Night. 90 tablet 3     busPIRone (BUSPAR) 10 MG tablet Take 1 tablet by mouth Every 8 (Eight) Hours. 90 tablet 0     Cariprazine HCl (Vraylar) 1.5 MG capsule capsule Take 1 capsule by mouth Daily. 30 capsule 0     carvedilol (COREG) 12.5 MG tablet Take 1 tablet by mouth 2 (Two) Times a Day With Meals. 180 tablet 3     cloNIDine (CATAPRES) 0.1 MG tablet Take 1 tablet by mouth Daily As Needed (anxiety). 30 tablet 0     folic acid (FOLVITE) 1 MG tablet Take 1 tablet by mouth Daily. 30 tablet 0     lisinopril (PRINIVIL,ZESTRIL) 40 MG tablet Take 1 tablet by mouth Daily. 90 tablet 3     pantoprazole (PROTONIX) 40 MG EC tablet Take 1 tablet by mouth Every Morning Before Breakfast. 30 tablet 0     spironolactone (ALDACTONE) 25 MG tablet Take 1 tablet by mouth Daily. 90 tablet 3     thiamine (VITAMIN B1) 100 MG tablet Take 1 tablet by mouth Daily. 30 tablet 0     vilazodone (Viibryd) 40 MG tablet tablet Take 1 tablet by mouth Daily. 30 tablet 6     vitamin B-12 (CYANOCOBALAMIN) 500 MCG tablet Take 1 tablet by mouth Daily. 90 tablet 0        Allergies:  No Known Allergies  Immunizations:  Immunization History   Administered Date(s) Administered    COVID-19 (PFIZER) Purple Cap Monovalent 03/09/2021, 04/06/2021, 12/29/2021    Covid-19 (Pfizer) Gray Cap Monovalent  "2022    Fluzone (or Fluarix & Flulaval for VFC) >6mos 2022    Influenza TIV (IM) 2018    Influenza, Unspecified 2018, 2018    Tdap 2021, 12/10/2023     Social History:   Social History     Social History Narrative    Not on file     Social History     Socioeconomic History    Marital status:    Tobacco Use    Smoking status: Never     Passive exposure: Never    Smokeless tobacco: Never   Vaping Use    Vaping status: Never Used   Substance and Sexual Activity    Alcohol use: Not Currently    Drug use: Not Currently     Frequency: 1.0 times per week    Sexual activity: Defer     Partners: Female     Birth control/protection: Other, Birth control pill       Family History:  Family History   Problem Relation Age of Onset    Stroke Mother     Heart disease Mother     Stroke Father     Hyperlipidemia Father     Hypertension Father         Review of Systems  See history of present illness and past medical history.  Patient denies any fever chills night sweats.  Admits to some resolved nausea and vomiting.  He is not really complaining much of a headache either.  Denies any focal changes visible taste or sound.  Denies any problem swallowing chest pain palpitation cough shortness of breath.  Admits to passing out but denies any focal loss of function.  Remainder of ROS is negative.    Objective:  T Max 24 hrs: Temp (24hrs), Av °F (36.7 °C), Min:97.7 °F (36.5 °C), Max:98.2 °F (36.8 °C)    Vitals Ranges:   Temp:  [97.7 °F (36.5 °C)-98.2 °F (36.8 °C)] 97.7 °F (36.5 °C)  Heart Rate:  [55-79] 78  Resp:  [18] 18  BP: ()/(56-92) 119/92      Exam:  /92 Comment: standing  Pulse 78   Temp 97.7 °F (36.5 °C) (Oral)   Resp 18   Ht 188 cm (74.02\")   Wt 103 kg (226 lb 10.1 oz)   SpO2 95%   BMI 29.09 kg/m²     General Appearance:    Alert, cooperative, fluent speech, nontoxic, excellent historian, alert and oriented x 3, no family present   Head:    Normocephalic, some mild " occipital soreness but no major abnormalities found   Eyes:    PERRLA/EOM's intact, both eyes   Ears:    Normal external ear canals, both ears   Nose:   Nares normal, septum midline, mucosa normal, no drainage    or sinus tenderness   Throat:   Lips, mucosa, and tongue normal though mucous membranes a touch dry   Neck:   Supple, no point TTP or JVD       Lungs:     Clear to auscultation bilaterally, respirations unlabored   Chest Wall:    No tenderness or deformity    Heart:    Regular rate and rhythm, S1 and S2 normal   Abdomen:     Soft, nontender, bowel sounds active all four quadrants   Extremities: No particular joint pain to palpitation, no volume overload/pitting edema   Pulses:   2+ and symmetric all extremities   Skin:   Skin color, texture, no rashes or lesions       Neurologic:   CNII-XII intact, moving all while in bed, gait not evaluated      .    Data Review:  Labs in chart were reviewed.             Imaging Results (All)       Procedure Component Value Units Date/Time    CT Cervical Spine Without Contrast [[id number]] Collected: 06/23/25 2146     Updated: 06/23/25 2152    Narrative:      CT OF THE CERVICAL SPINE     HISTORY: Fall     COMPARISON: None available.     TECHNIQUE: Axial CT imaging was obtained through the cervical spine.  Coronal and sagittal reformatted images were obtained.     FINDINGS:  No acute fracture or subluxation of the cervical spine is seen. There is  mild anterolisthesis of C4 on C5. There is retrolisthesis of C5 on C6.  Intervertebral disc space narrowing is noted at multiple levels. There  is no prevertebral soft tissue swelling. Images through the lung bases  are clear. Canal stenosis is most significant at C6-C7 and C5-C6. Neural  foraminal narrowing is noted at multiple levels, but is most significant  at C5-C6 bilaterally, as well as C6-C7 on the left.       Impression:      No acute fracture or subluxation identified.     Radiation dose reduction techniques were  utilized, including automated  exposure control and exposure modulation based on body size.        This report was finalized on 6/23/2025 9:49 PM by Dr. Harini Arreaga M.D on Workstation: [id number]       CT Head Without Contrast [[id number]] Collected: 06/23/25 2144     Updated: 06/23/25 2149    Narrative:      CT OF THE HEAD WITHOUT CONTRAST     HISTORY: Syncope     COMPARISON: February 23, 2025     TECHNIQUE: Axial CT imaging was obtained through the brain. No IV  contrast was administered.     FINDINGS:  No acute intracranial hemorrhage is seen. The ventricles are normal in  size. There is no midline shift or mass effect. No calvarial fracture is  seen. There is some mucosal thickening noted within the ethmoid sinuses.  There is also some trace fluid within the left mastoid air cells. The  patient does appear to have a midline parietal scalp hematoma.       Impression:      No acute intracranial findings.     Radiation dose reduction techniques were utilized, including automated  exposure control and exposure modulation based on body size.        This report was finalized on 6/23/2025 9:46 PM by Dr. Harini Arreaga M.D on Workstation: [id number]       XR Chest 1 View [[id number]] Collected: 06/23/25 2115     Updated: 06/23/25 2120    Narrative:      SINGLE VIEW OF THE CHEST     HISTORY: Syncope     COMPARISON: May 21, 2025     FINDINGS:  Heart size is within normal limits. No pneumothorax, pleural effusion,  or acute infiltrate is seen. There is a cardiac loop recorder.       Impression:      No acute findings.     This report was finalized on 6/23/2025 9:17 PM by Dr. 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       Plan:      This patient is well-known to myself as I take care of him in the past.  He is very well versed in regards to orthostatic hypotension and precautions but he has been doing some exercise and walking with recent heat so I presume Syncope secondary to diminished volume status/dehydration leading to ARF    - Upcoming watchman's procedure pending July 7   - Has history of CVA but not anticoagulated secondary to orthostasis and falls   - Creatinine improving with IVF 2+-1.5 and IVF x 24 hours with repeat labs in a.m. and would anticipate them to return to baseline and then can restart held medications without amlodipine pending BP trends   - PT and OT to evaluate   - DC amlodipine at 10 mg is likely compounding things-current SBP only 119   - Hold ACE/spironolactone today as well given ARF    Past history of CVA-ASA/statin    Very mild leukocytosis likely reactive/afebrile-repeat CBC in a.m.    Fall precautions    SCDs for additional prophylaxis    GERD-PPI      Further recommendations to follow as clinical course unfolds        Sebastian Gonsalez MD  6/24/2025  07:15 EDT

## 2025-06-24 NOTE — CONSULTS
Patient Name: Flako Coreas Sr.  :1965  60 y.o.    Date of Admission: 2025  Date of Consultation:  25  Encounter Provider: JANEEN Lara  Place of Service: [facility] CARDIOLOGY  Referring Provider: No ref. provider found  Patient Care Team:  Akash Rodriguez Jr.,  as PCP - General (Sports Medicine)  Chinmay Taylor MD as Cardiologist (Cardiology)  Pan Leon MD as Consulting Physician (Neurology)  Anna Anand APRN as Nurse Practitioner (Behavioral Health)      Chief complaint: syncope    History of Present Illness: Mr. Coreas is a 60 year old gentleman followed by Dr. Taylor. 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 He has recurrent syncope. He has a loop recorder in place that has showed some pauses that did not correlate with symptoms. He has also been noted to have brief AF - none recently. He has not been anticoagulated due to recurrent falls, however has been referred for left atrial appendage closure.     He was admitted in 2025 for hypertensive urgency.     He had a tilt table in March that was negative.     He saw Dr. Taylor in . BP very well controlled. He had been doing great and walking 3-5 miles a day.     He continued to do well until yesterday evening, he was having dizziness. Needing to lay down. He checked BP frequently and noted systolic in the 90s then 80s. Then passed out.     He did trigger his loop recorder 4 min before passing out. Interrogation pending.     Echo 25    Left ventricular ejection fraction appears to be 56 - 60%.    The left ventricular cavity is borderline dilated.    Left ventricular diastolic function was normal.    Estimated right ventricular systolic pressure from tricuspid regurgitation is normal (<35 mmHg).    Past Medical History:   Diagnosis Date    ADHD (attention deficit hyperactivity disorder)     On going issue    Alcohol abuse     Anxiety      Lepe's esophagus     Benign prostatic hyperplasia Surgery in 12/2021    Brain concussion 11/2023    Clotting disorder Intestinal    Depression     Diverticulitis     Diverticulosis     Duodenitis     Enteritis     Failure to thrive (child)     Family history of blood clots     Fracture of wrist 1985    Fracture, foot 2019    GERD (gastroesophageal reflux disease)     Hyperlipidemia     Hypertension     Hypertensive emergency     Implantable loop recorder present 03/12/2025    Irritable bowel syndrome 2017    Low testosterone     Microcytosis     Pancreatitis     Peptic ulcer disease 02/23/2025    Pneumonia     PONV (postoperative nausea and vomiting)     Seasonal affective disorder     Shoulder injury     Stroke     Unexplained weight loss     Visual impairment 8/2023       Past Surgical History:   Procedure Laterality Date    ABDOMINAL SURGERY      CARDIAC ELECTROPHYSIOLOGY PROCEDURE N/A 01/24/2024    Procedure: Loop insertion- [employer] LINQ;  Surgeon: Kaela Walker MD;  Location: [facility] CATH INVASIVE LOCATION;  Service: Cardiovascular;  Laterality: N/A;    COLON SURGERY      COLONOSCOPY      COLONOSCOPY N/A 12/11/2022    Procedure: COLONOSCOPY INTO CECUM WITH HOT SNARE POLYPECTOMY;  Surgeon: Albert Gallo MD;  Location: [facility] ENDOSCOPY;  Service: Gastroenterology;  Laterality: N/A;  PRE- GI BLEED  POST- DIVERTICULOSIS, POLYP    COLONOSCOPY N/A 02/14/2024    Procedure: COLONOSCOPY into cecum/terminal ileum;  Surgeon: Albert Gallo MD;  Location: [facility] ENDOSCOPY;  Service: Gastroenterology;  Laterality: N/A;  diverticulosis, hemorrhoids    ENDOSCOPY N/A 12/10/2022    Procedure: ESOPHAGOGASTRODUODENOSCOPY;  Surgeon: Albert Gallo MD;  Location: [facility] ENDOSCOPY;  Service: Gastroenterology;  Laterality: N/A;  PRE- DARK STOOLS  POST- BARRETTS ESOPHAGUS    ENDOSCOPY N/A 02/14/2024    Procedure: ESOPHAGOGASTRODUODENOSCOPY with biopsy;  Surgeon: Albert Gallo MD;  Location: [facility] ENDOSCOPY;  Service:  Gastroenterology;  Laterality: N/A;  long segment wilson's, hiatal hernia    FRACTURE SURGERY Right 1980    for broken arm    FRACTURE SURGERY Right     for broken hand    GASTRECTOMY      HAND SURGERY  1990    INCISION AND DRAINAGE ABSCESS  2015    anal    JOINT REPLACEMENT  2022    Rebuild shoulder    PROSTATE SURGERY      SHOULDER SURGERY  2022    SKIN BIOPSY      SMALL INTESTINE SURGERY      TONSILLECTOMY      TRIGGER POINT INJECTION  2017         Prior to Admission medications    Medication Sig Start Date End Date Taking? Authorizing Provider   amLODIPine (NORVASC) 10 MG tablet Take 1 tablet by mouth Daily. 6/17/25   Chinmay Taylor MD   aspirin 81 MG chewable tablet Chew 1 tablet Daily.    Provider, MD Ovidio   atorvastatin (LIPITOR) 40 MG tablet Take 1 tablet by mouth Every Night. 6/17/25 6/17/26  Chinmay Taylor MD   busPIRone (BUSPAR) 10 MG tablet Take 1 tablet by mouth Every 8 (Eight) Hours. 2/27/25   Alden Rausch MD   Cariprazine HCl (Vraylar) 1.5 MG capsule capsule Take 1 capsule by mouth Daily. 5/27/25   Anna Anand APRN   carvedilol (COREG) 12.5 MG tablet Take 1 tablet by mouth 2 (Two) Times a Day With Meals. 6/17/25   Chinmay Taylor MD   cloNIDine (CATAPRES) 0.1 MG tablet Take 1 tablet by mouth Daily As Needed (anxiety). 5/27/25   Anna Anand APRN   folic acid (FOLVITE) 1 MG tablet Take 1 tablet by mouth Daily. 2/3/25   Sebastian Gonsalez MD   lisinopril (PRINIVIL,ZESTRIL) 40 MG tablet Take 1 tablet by mouth Daily. 6/17/25   Chinmay Taylor MD   pantoprazole (PROTONIX) 40 MG EC tablet Take 1 tablet by mouth Every Morning Before Breakfast. 2/3/25   Sebastian Gonsalez MD   spironolactone (ALDACTONE) 25 MG tablet Take 1 tablet by mouth Daily. 6/17/25 6/17/26  Chinmay Taylor MD   thiamine (VITAMIN B1) 100 MG tablet Take 1 tablet by mouth Daily. 2/3/25   Sebastian Gonsalez MD   vilazodone (Viibryd) 40 MG tablet tablet Take 1 tablet by mouth Daily. 5/19/25   Jennifer  Akash Seay Jr., DO   vitamin B-12 (CYANOCOBALAMIN) 500 MCG tablet Take 1 tablet by mouth Daily. 4/22/24   Akash Rodriguez Jr., DO       No Known Allergies    Social History     Socioeconomic History    Marital status:    Tobacco Use    Smoking status: Never     Passive exposure: Never    Smokeless tobacco: Never   Vaping Use    Vaping status: Never Used   Substance and Sexual Activity    Alcohol use: Not Currently    Drug use: Not Currently     Frequency: 1.0 times per week    Sexual activity: Defer     Partners: Female     Birth control/protection: Other, Birth control pill       Family History   Problem Relation Age of Onset    Stroke Mother     Heart disease Mother     Stroke Father     Hyperlipidemia Father     Hypertension Father        REVIEW OF SYSTEMS:   All systems reviewed.  Pertinent positives identified in HPI.  All other systems are negative.      Objective:     Vitals:    06/24/25 0618 06/24/25 0624 06/24/25 0629 06/24/25 0758   BP: (!) 87/56 108/79 119/92 127/85   BP Location:    Right arm   Patient Position:    Lying   Pulse: 62 67 78 68   Resp:    18   Temp:    98.2 °F (36.8 °C)   TempSrc:    Oral   SpO2:    98%   Weight:       Height:         Body mass index is 29.09 kg/m².    Physical Exam:  Constitutional: He is oriented to person, place, and time. He appears well-developed. He does not appear ill.   HENT:   Head: Normocephalic and atraumatic. Head is without contusion.   Right Ear: Hearing normal. No drainage.   Left Ear: Hearing normal. No drainage.   Nose: No nasal deformity. No epistaxis.   Eyes: Lids are normal. Right eye exhibits no exudate. Left eye exhibits no exudate.  Neck: No JVD present. Carotid bruit is not present. No tracheal deviation present. No thyroid mass and no thyromegaly present.   Cardiovascular: Normal rate, regular rhythm and normal heart sounds.    Pulses:       Posterior tibial pulses are 2+ on the right side, and 2+ on the left side.    Pulmonary/Chest: Effort normal and breath sounds normal.   Abdominal: Soft. Normal appearance and bowel sounds are normal. There is no tenderness.   Musculoskeletal: Normal range of motion.        Right shoulder: He exhibits no deformity.        Left shoulder: He exhibits no deformity.   Neurological: He is alert and oriented to person, place, and time. He has normal strength.   Skin: Skin is warm, dry and intact. No rash noted.   Psychiatric: He has a normal mood and affect. His behavior is normal. Thought content normal.   Vitals reviewed      Lab Review:     Results from last 7 days   Lab Units 06/24/25 0405 06/23/25 2029   SODIUM mmol/L 137 136   POTASSIUM mmol/L 4.3 4.6   CHLORIDE mmol/L 104 100   CO2 mmol/L 22.2 20.9*   BUN mg/dL 61.0* 67.0*   CREATININE mg/dL 1.57* 2.17*   CALCIUM mg/dL 8.7 9.5   BILIRUBIN mg/dL  --  0.4   ALK PHOS U/L  --  72   ALT (SGPT) U/L  --  26   AST (SGOT) U/L  --  18   GLUCOSE mg/dL 115* 135*     Results from last 7 days   Lab Units 06/23/25 2125 06/23/25 2029   HSTROP T ng/L 8 9     Results from last 7 days   Lab Units 06/24/25 0405   WBC 10*3/mm3 11.63*   HEMOGLOBIN g/dL 13.6   HEMATOCRIT % 42.1   PLATELETS 10*3/mm3 239               Current Facility-Administered Medications:     acetaminophen (TYLENOL) tablet 650 mg, 650 mg, Oral, Q4H PRN, 650 mg at 06/24/25 0945 **OR** acetaminophen (TYLENOL) 160 MG/5ML oral solution 650 mg, 650 mg, Oral, Q4H PRN **OR** acetaminophen (TYLENOL) suppository 650 mg, 650 mg, Rectal, Q4H PRN, Lesa Busch APRN    aspirin chewable tablet 81 mg, 81 mg, Oral, Daily, Sebastian Gonsalez MD, 81 mg at 06/24/25 0934    atorvastatin (LIPITOR) tablet 40 mg, 40 mg, Oral, Nightly, Sebastian Gonsalez MD    sennosides-docusate (PERICOLACE) 8.6-50 MG per tablet 2 tablet, 2 tablet, Oral, BID PRN **AND** polyethylene glycol (MIRALAX) packet 17 g, 17 g, Oral, Daily PRN **AND** bisacodyl (DULCOLAX) EC tablet 5 mg, 5 mg, Oral, Daily PRN **AND**  bisacodyl (DULCOLAX) suppository 10 mg, 10 mg, Rectal, Daily PRN, Lesa Busch APRN    busPIRone (BUSPAR) tablet 10 mg, 10 mg, Oral, Q8H, Sebastian Gonsalez MD, 10 mg at 06/24/25 0936    calcium carbonate (TUMS) chewable tablet 500 mg (200 mg elemental), 2 tablet, Oral, BID PRN, Lesa Busch APRN    carvedilol (COREG) tablet 12.5 mg, 12.5 mg, Oral, BID With Meals, Sebastian Gonsalez MD, 12.5 mg at 06/24/25 0934    folic acid (FOLVITE) tablet 1 mg, 1 mg, Oral, Daily, Sebastian Gonsalez MD, 1 mg at 06/24/25 0934    ondansetron ODT (ZOFRAN-ODT) disintegrating tablet 4 mg, 4 mg, Oral, Q6H PRN **OR** ondansetron (ZOFRAN) injection 4 mg, 4 mg, Intravenous, Q6H PRN, Lesa Busch APRN    pantoprazole (PROTONIX) EC tablet 40 mg, 40 mg, Oral, QAM AC, Sebastian Gonsalez MD, 40 mg at 06/24/25 0936    [COMPLETED] Insert Peripheral IV, , , Once **AND** sodium chloride 0.9 % flush 10 mL, 10 mL, Intravenous, PRN, Jeane Ramirez PA-C    sodium chloride 0.9 % flush 10 mL, 10 mL, Intravenous, Q12H, Lesa Busch APRN, 10 mL at 06/24/25 0930    sodium chloride 0.9 % flush 10 mL, 10 mL, Intravenous, PRN, Lesa Busch APRN    sodium chloride 0.9 % infusion 40 mL, 40 mL, Intravenous, PRN, Lesa Busch APRN    sodium chloride 0.9 % infusion, 100 mL/hr, Intravenous, Continuous, Sebastian Gonsalez MD, Last Rate: 100 mL/hr at 06/24/25 1237, 100 mL/hr at 06/24/25 1237    thiamine (VITAMIN B-1) tablet 100 mg, 100 mg, Oral, Daily, Sebastian Gonsalez MD, 100 mg at 06/24/25 0937    vilazodone (VIIBRYD) tablet 40 mg, 40 mg, Oral, Daily, Sebastian Gonsalez MD, 40 mg at 06/24/25 0946    vitamin B-12 (CYANOCOBALAMIN) tablet 500 mcg, 500 mcg, Oral, Daily, Sebastian Gonsalez MD, 500 mcg at 06/24/25 0937    Assessment and Plan:       Active Hospital Problems    Diagnosis  POA    **Syncope [R55]  Unknown    Orthostatic hypotension [I95.1]  Unknown    ALLISON (acute kidney injury) [N17.9]   Yes    History of CVA (cerebrovascular accident) [Z86.73]  Not Applicable    Mood disorder [F39]  Yes    PAF (paroxysmal atrial fibrillation) [I48.0]  Yes    Memory loss [R41.3]  Yes    History of alcohol dependence [F10.21]  Yes    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     Syncope , due to hypotension . No recent antiHTN med changes. Mild ALLISON. Responding favorably to IVF.   Hypertension  History of 13 second pause on loop recorder, felt to be vasovagal and PPM not recommended at that time.   PAF , low burden and none recently. He has not been anticoagulated due to recurrent fainting and falls. He would be a candidate for short term AC and has been referred for left atrial occlusion.   KRIS  History of stroke, suspected embolic.   Coronary artery calcification  Hyperlipidemia with elevated lipoprotein.     No events recorded on loop recorder.   BP currently stable on carvedilol (home lisinopril / spironolactone / amlodipine held). Slowly re introduce as BP increases.     Gin Barron, APRN  06/24/25  13:08 EDT

## 2025-06-24 NOTE — PROGRESS NOTES
Name: Flako Coreas . ADMIT: 2025   : 1965  PCP: Akash Rodriguez Jr.,     MRN: [id number] LOS: 0 days   AGE/SEX: 60 y.o. male  ROOM: [facility]     Subjective   Subjective   Patient seen this morning.  No acute events overnight.  Feeling better.  Denies dizziness, was able to ambulate yesterday. No chest pain, palpations .     Review of Systems   As above  Objective   Objective   Vital Signs  Temp:  [98.1 °F (36.7 °C)-98.6 °F (37 °C)] 98.6 °F (37 °C)  Heart Rate:  [62-74] 69  Resp:  [18] 18  BP: (116-153)/(71-99) 153/99  SpO2:  [93 %-98 %] 93 %  on   ;   Device (Oxygen Therapy): room air  Body mass index is 29.09 kg/m².  Physical Exam    General: Alert and oriented x3, no acute distress  HEENT: Normocephalic, atraumatic  CV: Regular rate and rhythm, no murmurs rubs or gallops  Lungs: Clear to auscultation bilaterally, no crackles or wheezes  Abdomen: Soft, nontender, nondistended  Extremities: No significant peripheral edema , no cyanosis     Results Review     I reviewed the patient's new clinical results.  Results from last 7 days   Lab Units 25  0617 255 25   WBC 10*3/mm3 6.93 11.63* 12.87*   HEMOGLOBIN g/dL 14.3 13.6 15.4   PLATELETS 10*3/mm3 223 239 307     Results from last 7 days   Lab Units 25  0617 25  0405 25   SODIUM mmol/L 138 137 136   POTASSIUM mmol/L 5.0 4.3 4.6   CHLORIDE mmol/L 107 104 100   CO2 mmol/L 21.5* 22.2 20.9*   BUN mg/dL 26.0* 61.0* 67.0*   CREATININE mg/dL 1.06 1.57* 2.17*   GLUCOSE mg/dL 95 115* 135*   Estimated Creatinine Clearance: 97.2 mL/min (by C-G formula based on SCr of 1.06 mg/dL).  Results from last 7 days   Lab Units 25   ALBUMIN g/dL 4.3   BILIRUBIN mg/dL 0.4   ALK PHOS U/L 72   AST (SGOT) U/L 18   ALT (SGPT) U/L 26     Results from last 7 days   Lab Units 25  0617 25  0405 25   CALCIUM mg/dL 8.5* 8.7 9.5   ALBUMIN g/dL  --   --  4.3   MAGNESIUM mg/dL 2.3  --    "--    PHOSPHORUS mg/dL 2.4*  --   --        COVID19   Date Value Ref Range Status   12/08/2022 Not Detected Not Detected - Ref. Range Final     SARS-CoV-2, BARON   Date Value Ref Range Status   12/28/2021 Not Detected Not Detected Final     Comment:     This nucleic acid amplification test was developed and its performance  characteristics determined by [employer]. Nucleic acid  amplification tests include RT-PCR and TMA. This test has not been  FDA cleared or approved. This test has been authorized by FDA under  an Emergency Use Authorization (EUA). This test is only authorized  for the duration of time the declaration that circumstances exist  justifying the authorization of the emergency use of in vitro  diagnostic tests for detection of SARS-CoV-2 virus and/or diagnosis  of COVID-19 infection under section 564(b)(1) of the Act, 21 U.S.C.  360bbb-3(b) (1), unless the authorization is terminated or revoked  sooner.  When diagnostic testing is negative, the possibility of a false  negative result should be considered in the context of a patient's  recent exposures and the presence of clinical signs and symptoms  consistent with COVID-19. An individual without symptoms of COVID-19  and who is not shedding SARS-CoV-2 virus would expect to have a  negative (not detected) result in this assay.     No results found for: \"HGBA1C\", \"POCGLU\"        CT Cervical Spine Without Contrast  Narrative: CT OF THE CERVICAL SPINE     HISTORY: Fall     COMPARISON: None available.     TECHNIQUE: Axial CT imaging was obtained through the cervical spine.  Coronal and sagittal reformatted images were obtained.     FINDINGS:  No acute fracture or subluxation of the cervical spine is seen. There is  mild anterolisthesis of C4 on C5. There is retrolisthesis of C5 on C6.  Intervertebral disc space narrowing is noted at multiple levels. There  is no prevertebral soft tissue swelling. Images through the lung bases  are clear. Canal " stenosis is most significant at C6-C7 and C5-C6. Neural  foraminal narrowing is noted at multiple levels, but is most significant  at C5-C6 bilaterally, as well as C6-C7 on the left.     Impression: No acute fracture or subluxation identified.     Radiation dose reduction techniques were utilized, including automated  exposure control and exposure modulation based on body size.        This report was finalized on 6/23/2025 9:49 PM by Dr. Harini Arreaga M.D on Workstation: [id number]     CT Head Without Contrast  Narrative: CT OF THE HEAD WITHOUT CONTRAST     HISTORY: Syncope     COMPARISON: February 23, 2025     TECHNIQUE: Axial CT imaging was obtained through the brain. No IV  contrast was administered.     FINDINGS:  No acute intracranial hemorrhage is seen. The ventricles are normal in  size. There is no midline shift or mass effect. No calvarial fracture is  seen. There is some mucosal thickening noted within the ethmoid sinuses.  There is also some trace fluid within the left mastoid air cells. The  patient does appear to have a midline parietal scalp hematoma.     Impression: No acute intracranial findings.     Radiation dose reduction techniques were utilized, including automated  exposure control and exposure modulation based on body size.        This report was finalized on 6/23/2025 9:46 PM by Dr. Harini Arreaga M.D on Workstation: [id number]     XR Chest 1 View  Narrative: SINGLE VIEW OF THE CHEST     HISTORY: Syncope     COMPARISON: May 21, 2025     FINDINGS:  Heart size is within normal limits. No pneumothorax, pleural effusion,  or acute infiltrate is seen. There is a cardiac loop recorder.     Impression: No acute findings.     This report was finalized on 6/23/2025 9:17 PM by Dr. Harini Arreaga M.D on Workstation: [id number]       Scheduled Medications  amLODIPine, 5 mg, Oral, Q24H  aspirin, 81 mg, Oral, Daily  atorvastatin, 40 mg, Oral, Nightly  busPIRone, 10 mg, Oral,  Q8H  carvedilol, 12.5 mg, Oral, BID With Meals  folic acid, 1 mg, Oral, Daily  pantoprazole, 40 mg, Oral, QAM AC  sodium chloride, 10 mL, Intravenous, Q12H  thiamine, 100 mg, Oral, Daily  vilazodone, 40 mg, Oral, Daily  vitamin B-12, 500 mcg, Oral, Daily    Infusions     Diet  Diet: Cardiac; Healthy Heart (2-3 Na+); Fluid Consistency: Thin (IDDSI 0)    I have personally reviewed     [x]  Laboratory   [x]  Microbiology   [x]  Radiology   [x]  EKG/Telemetry  []  Cardiology/Vascular   []  Pathology    []  Records       Assessment/Plan     Active Hospital Problems    Diagnosis  POA    **Syncope [R55]  Unknown    Orthostatic hypotension [I95.1]  Unknown    ALLISON (acute kidney injury) [N17.9]  Yes    History of CVA (cerebrovascular accident) [Z86.73]  Not Applicable    Mood disorder [F39]  Yes    PAF (paroxysmal atrial fibrillation) [I48.0]  Yes    Memory loss [R41.3]  Yes    History of alcohol dependence [F10.21]  Yes    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       Patient is 61 yo M with a h/o  orthostatic hypotension, syncope, blood pressure hypertension, PAF not on the anticoagulation due to recurrent falls, embolic CVA with residual ataxia, anxiety/depression, GERD, presented to the ED with syncopal episode.     ALLISON, resolved  - Creatinine on admission 2.17, baseline creatinine around 0.8-1  - Improving with holding BP meds, IV fluids  - Monitor daily BMP  - Creatinine improved, back to baseline.    Recurrent syncope  History of orthostatic hypotension  Labile essential hypertension  -current syncope most likely due to the dehydration and ongoing use of BP meds which also led to ALLISON as above.  Patient reported blood pressure as low as 85 /60 at home\  - On IV fluids.   Carvedilol 12.5 mg  twice daily continued.    -Amlodipine 10 mg daily spironolactone 25 mg daily, lisinopril 40 mg daily on hold.  - Cardiology following  - Amlodipine resumed at lower dose 5 mg daily as of 6/25/2024      History of embolic CVA with residual ataxia  - Not a candidate for anticoagulation secondary to recurrent falls  - Follows with cardiology, plan for  Watchman procedure.  -Continue aspirin, statin    Anxiety/depression  - Continue BuSpar/vibryd    GERD  Pantoprazole       SCDs for DVT prophylaxis.  Full code.  Discussed with patient.  Discussed multidisciplinary rounds  Discussed with cardiology  Disposition: Home, likely tomorrow if BP stable and cleared by cardiology.  Expected Discharge Date: 6/25/2025; Expected Discharge Time:        Copied text in this note has been reviewed and is accurate as of 06/25/25.         Dictated utilizing Dragon dictation        Ivone Siddiqi MD  [facility]  06/25/25  15:05 EDT

## 2025-06-24 NOTE — ED NOTES
.Nursing report ED to floor  Flako Coreas Sr.  60 y.o.  male    HPI :  HPI  Stated Reason for Visit: Pt arrived via [facility] from home w/ c/o syncope episode around 1910. Pt reports hitting the R side of his head w/ +LOC. Pt reports taking his blood pressure around 1900 tonight & his systolic was in the 80's. Pt reports history of stroke & is not able to take a blood thinner due to ongoing falls & history of orthostatic hypotension. Pt reports an appointment on July 7th to consult of getting the watchmen's device.      Pt reports currently having a loop recorder.  History Obtained From: patient, EMS    Chief Complaint  Chief Complaint   Patient presents with    Syncope       Admitting doctor:   Geo Hester MD    Admitting diagnosis:   The primary encounter diagnosis was Syncope and collapse. Diagnoses of ALLISON (acute kidney injury) and Closed head injury, initial encounter were also pertinent to this visit.    Code status:   Current Code Status       Date Active Code Status Order ID Comments User Context       Prior            Allergies:   Patient has no known allergies.    Isolation:   No active isolations    Intake and Output    Intake/Output Summary (Last 24 hours) at 6/23/2025 2329  Last data filed at 6/23/2025 2156  Gross per 24 hour   Intake 1000 ml   Output --   Net 1000 ml       Weight:       06/23/25 2005   Weight: 99.3 kg (218 lb 14.7 oz)       Most recent vitals:   Vitals:    06/23/25 2126 06/23/25 2200 06/23/25 2300 06/23/25 2301   BP: (S) 121/82 103/66 110/72 103/72   BP Location:    Right arm   Patient Position: (S) Standing   Lying   Pulse: (S) 79 59 55 62   Resp:    18   Temp:       TempSrc:       SpO2:  94% 95% 95%   Weight:       Height:           Active LDAs/IV Access:   Lines, Drains & Airways       Active LDAs       Name Placement date Placement time Site Days    Peripheral IV 06/23/25 2031 18 G Posterior;Right Wrist 06/23/25 2031  Wrist  less than 1                    Labs (abnormal  labs have a star):   Labs Reviewed   COMPREHENSIVE METABOLIC PANEL - Abnormal; Notable for the following components:       Result Value    Glucose 135 (*)     BUN 67.0 (*)     Creatinine 2.17 (*)     CO2 20.9 (*)     BUN/Creatinine Ratio 30.9 (*)     Anion Gap 15.1 (*)     eGFR 34.4 (*)     All other components within normal limits    Narrative:     GFR Categories in Chronic Kidney Disease (CKD)              GFR Category          GFR (mL/min/1.73)    Interpretation  G1                    90 or greater        Normal or high (1)  G2                    60-89                Mild decrease (1)  G3a                   45-59                Mild to moderate decrease  G3b                   30-44                Moderate to severe decrease  G4                    15-29                Severe decrease  G5                    14 or less           Kidney failure    (1)In the absence of evidence of kidney disease, neither GFR category G1 or G2 fulfill the criteria for CKD.    eGFR calculation 2021 CKD-EPI creatinine equation, which does not include race as a factor   CBC WITH AUTO DIFFERENTIAL - Abnormal; Notable for the following components:    WBC 12.87 (*)     Neutrophils, Absolute 8.25 (*)     Monocytes, Absolute 1.02 (*)     Eosinophils, Absolute 0.70 (*)     Immature Grans, Absolute 0.06 (*)     All other components within normal limits   TROPONIN - Normal    Narrative:     High Sensitive Troponin T Reference Range:  <14.0 ng/L- Negative Female for AMI  <22.0 ng/L- Negative Male for AMI  >=14 - Abnormal Female indicating possible myocardial injury.  >=22 - Abnormal Male indicating possible myocardial injury.   Clinicians would have to utilize clinical acumen, EKG, Troponin, and serial changes to determine if it is an Acute Myocardial Infarction or myocardial injury due to an underlying chronic condition.        HIGH SENSITIVITIY TROPONIN T 1HR - Normal    Narrative:     High Sensitive Troponin T Reference Range:  <14.0 ng/L-  Negative Female for AMI  <22.0 ng/L- Negative Male for AMI  >=14 - Abnormal Female indicating possible myocardial injury.  >=22 - Abnormal Male indicating possible myocardial injury.   Clinicians would have to utilize clinical acumen, EKG, Troponin, and serial changes to determine if it is an Acute Myocardial Infarction or myocardial injury due to an underlying chronic condition.        CBC AND DIFFERENTIAL    Narrative:     The following orders were created for panel order CBC & Differential.  Procedure                               Abnormality         Status                     ---------                               -----------         ------                     CBC Auto Differential[[id number]]        Abnormal            Final result                 Please view results for these tests on the individual orders.       EKG:   ECG 12 Lead Syncope   Preliminary Result   HEART RATE=62  bpm   RR Latgcagk=839  ms   MO Dcdeexqo=519  ms   P Horizontal Axis=18  deg   P Front Axis=-17  deg   QRSD Interval=95  ms   QT Grwvoaoa=230  ms   KBeZ=245  ms   QRS Axis=40  deg   T Wave Axis=42  deg   - ABNORMAL ECG -   Sinus rhythm   Inferior infarct, old   Borderline ST elevation, lateral leads   When compared with ECG of 21-May-2025 15:10:54,   No significant change   Date and Time of Study:2025-06-23 20:27:03          Meds given in ED:   Medications   sodium chloride 0.9 % flush 10 mL (has no administration in time range)   sodium chloride 0.9 % bolus 1,000 mL (0 mL Intravenous Stopped 6/23/25 2156)       Imaging results:  CT Cervical Spine Without Contrast  Result Date: 6/23/2025  No acute fracture or subluxation identified.  Radiation dose reduction techniques were utilized, including automated exposure control and exposure modulation based on body size.   This report was finalized on 6/23/2025 9:49 PM by Dr. Harini Arreaga M.D on Workstation: [id number]      CT Head Without Contrast  Result Date: 6/23/2025  No acute  intracranial findings.  Radiation dose reduction techniques were utilized, including automated exposure control and exposure modulation based on body size.   This report was finalized on 6/23/2025 9:46 PM by Dr. Harini Arreaga M.D on Workstation: [email or web address]      XR Chest 1 View  Result Date: 6/23/2025  No acute findings.  This report was finalized on 6/23/2025 9:17 PM by Dr. Harini Arreaga M.D on Workstation: [email or web address]          Social issues:   Social History     Socioeconomic History    Marital status:    Tobacco Use    Smoking status: Never     Passive exposure: Never    Smokeless tobacco: Never   Vaping Use    Vaping status: Never Used   Substance and Sexual Activity    Alcohol use: Not Currently    Drug use: Not Currently     Frequency: 1.0 times per week    Sexual activity: Defer     Partners: Female     Birth control/protection: Other, Birth control pill       Peripheral Neurovascular  Peripheral Neurovascular (Adult)  Peripheral Neurovascular WDL: WDL    Neuro Cognitive  Neuro Cognitive (Adult)  Cognitive/Neuro/Behavioral WDL: WDL    Learning  Learning Assessment  Learning Readiness and Ability: no barriers identified    Respiratory  Respiratory WDL  Respiratory WDL: WDL    Abdominal Pain       Pain Assessments  Pain (Adult)  (0-10) Pain Rating: Rest: 1  (0-10) Pain Rating: Activity: 2    NIH Stroke Scale       Olive Restrepo RN  06/23/25 23:29 EDT

## 2025-06-25 LAB
ANION GAP SERPL CALCULATED.3IONS-SCNC: 9.5 MMOL/L (ref 5–15)
BUN SERPL-MCNC: 26 MG/DL (ref 6–20)
BUN/CREAT SERPL: 24.5 (ref 7–25)
CALCIUM SPEC-SCNC: 8.5 MG/DL (ref 8.6–10.5)
CHLORIDE SERPL-SCNC: 107 MMOL/L (ref 98–107)
CO2 SERPL-SCNC: 21.5 MMOL/L (ref 22–29)
CREAT SERPL-MCNC: 1.06 MG/DL (ref 0.76–1.27)
DEPRECATED RDW RBC AUTO: 40.4 FL (ref 37–54)
EGFRCR SERPLBLD CKD-EPI 2021: 81.3 ML/MIN/1.73
ERYTHROCYTE [DISTWIDTH] IN BLOOD BY AUTOMATED COUNT: 13 % (ref 12.3–15.4)
GLUCOSE SERPL-MCNC: 95 MG/DL (ref 65–99)
HCT VFR BLD AUTO: 42.9 % (ref 37.5–51)
HGB BLD-MCNC: 14.3 G/DL (ref 13–17.7)
MAGNESIUM SERPL-MCNC: 2.3 MG/DL (ref 1.6–2.6)
MCH RBC QN AUTO: 28.8 PG (ref 26.6–33)
MCHC RBC AUTO-ENTMCNC: 33.3 G/DL (ref 31.5–35.7)
MCV RBC AUTO: 86.3 FL (ref 79–97)
PHOSPHATE SERPL-MCNC: 2.4 MG/DL (ref 2.5–4.5)
PLATELET # BLD AUTO: 223 10*3/MM3 (ref 140–450)
PMV BLD AUTO: 9.8 FL (ref 6–12)
POTASSIUM SERPL-SCNC: 5 MMOL/L (ref 3.5–5.2)
RBC # BLD AUTO: 4.97 10*6/MM3 (ref 4.14–5.8)
SODIUM SERPL-SCNC: 138 MMOL/L (ref 136–145)
URATE SERPL-MCNC: 5 MG/DL (ref 3.4–7)
WBC NRBC COR # BLD AUTO: 6.93 10*3/MM3 (ref 3.4–10.8)

## 2025-06-25 PROCEDURE — 85027 COMPLETE CBC AUTOMATED: CPT | Performed by: HOSPITALIST

## 2025-06-25 PROCEDURE — 99214 OFFICE O/P EST MOD 30 MIN: CPT | Performed by: INTERNAL MEDICINE

## 2025-06-25 PROCEDURE — 83735 ASSAY OF MAGNESIUM: CPT | Performed by: STUDENT IN AN ORGANIZED HEALTH CARE EDUCATION/TRAINING PROGRAM

## 2025-06-25 PROCEDURE — G0378 HOSPITAL OBSERVATION PER HR: HCPCS

## 2025-06-25 PROCEDURE — 84550 ASSAY OF BLOOD/URIC ACID: CPT | Performed by: HOSPITALIST

## 2025-06-25 PROCEDURE — 84100 ASSAY OF PHOSPHORUS: CPT | Performed by: STUDENT IN AN ORGANIZED HEALTH CARE EDUCATION/TRAINING PROGRAM

## 2025-06-25 PROCEDURE — 80048 BASIC METABOLIC PNL TOTAL CA: CPT | Performed by: HOSPITALIST

## 2025-06-25 RX ORDER — AMLODIPINE BESYLATE 5 MG/1
5 TABLET ORAL
Status: DISCONTINUED | OUTPATIENT
Start: 2025-06-25 | End: 2025-06-26

## 2025-06-25 RX ORDER — HYDRALAZINE HYDROCHLORIDE 20 MG/ML
10 INJECTION INTRAMUSCULAR; INTRAVENOUS EVERY 6 HOURS PRN
Status: DISCONTINUED | OUTPATIENT
Start: 2025-06-25 | End: 2025-06-26 | Stop reason: HOSPADM

## 2025-06-25 RX ADMIN — BUSPIRONE HYDROCHLORIDE 10 MG: 10 TABLET ORAL at 06:06

## 2025-06-25 RX ADMIN — Medication 100 MG: at 09:09

## 2025-06-25 RX ADMIN — FOLIC ACID 1 MG: 1 TABLET ORAL at 09:09

## 2025-06-25 RX ADMIN — AMLODIPINE BESYLATE 5 MG: 5 TABLET ORAL at 13:38

## 2025-06-25 RX ADMIN — CARVEDILOL 12.5 MG: 12.5 TABLET, FILM COATED ORAL at 17:28

## 2025-06-25 RX ADMIN — BUSPIRONE HYDROCHLORIDE 10 MG: 10 TABLET ORAL at 13:38

## 2025-06-25 RX ADMIN — Medication 10 ML: at 09:09

## 2025-06-25 RX ADMIN — CARVEDILOL 12.5 MG: 12.5 TABLET, FILM COATED ORAL at 09:09

## 2025-06-25 RX ADMIN — ATORVASTATIN CALCIUM 40 MG: 20 TABLET, FILM COATED ORAL at 20:56

## 2025-06-25 RX ADMIN — ASPIRIN 81 MG CHEWABLE TABLET 81 MG: 81 TABLET CHEWABLE at 09:09

## 2025-06-25 RX ADMIN — Medication 10 ML: at 20:56

## 2025-06-25 RX ADMIN — BUSPIRONE HYDROCHLORIDE 10 MG: 10 TABLET ORAL at 20:56

## 2025-06-25 RX ADMIN — VILAZODONE HYDROCHLORIDE 40 MG: 40 TABLET, FILM COATED ORAL at 09:10

## 2025-06-25 RX ADMIN — Medication 500 MCG: at 09:09

## 2025-06-25 RX ADMIN — PANTOPRAZOLE SODIUM 40 MG: 40 TABLET, DELAYED RELEASE ORAL at 06:06

## 2025-06-25 NOTE — PLAN OF CARE
Goal Outcome Evaluation:   Vital signs stable, blood pressure within normal limits.  Up with assist. Room air.  Still on IV fluid. Plan of care discussed, safety maintained.  Estimated discharge date: tomorrow.

## 2025-06-25 NOTE — PLAN OF CARE
Problem: Adult Inpatient Plan of Care  Goal: Absence of Hospital-Acquired Illness or Injury  Intervention: Prevent Skin Injury  Recent Flowsheet Documentation  Taken 6/25/2025 1640 by Sabine Argueta RN  Body Position: weight shifting  Taken 6/25/2025 1340 by Sabine Argueta RN  Body Position: weight shifting  Taken 6/25/2025 1235 by Sabine Argueta RN  Body Position: weight shifting  Taken 6/25/2025 0911 by Sabine Argueta RN  Body Position:   supine   position changed independently   Goal Outcome Evaluation:  Plan of Care Reviewed With: patient        Progress: improving     Pt Aox4. Up with sba around nurse's station. With episode of htn and symptomatic but resolved quickly. VSS. Safety maintained.

## 2025-06-25 NOTE — PLAN OF CARE
Goal Outcome Evaluation:  Plan of Care Reviewed With: patient           Outcome Evaluation: VSS, no c/o pain or dizziness overnight. Pt appeared to sleep some between care. IVF stopped at 0015. Pt up with SB assist, alarms in place--but calls out appropriately. Possible D/C today. Will CTM, safety maintained.

## 2025-06-25 NOTE — PROGRESS NOTES
LOS: 0 days   Patient Care Team:  Akahs Rodriguez Jr., DO as PCP - General (Sports Medicine)  Chinmay Taylor MD as Cardiologist (Cardiology)  Pan Leon MD as Consulting Physician (Neurology)  Anna Anand APRN as Nurse Practitioner (Behavioral Health)    Chief Complaint: Follow-up baseline systemic hypertension, hypotension on admission, syncope.    Interval History: He feels better.  His blood pressure is up today slightly higher.  He has not had any further syncope.  No chest pain.    Vital Signs:  Temp:  [98.1 °F (36.7 °C)-98.6 °F (37 °C)] 98.6 °F (37 °C)  Heart Rate:  [62-74] 69  Resp:  [18] 18  BP: (116-153)/(71-99) 153/99    Intake/Output Summary (Last 24 hours) at 6/25/2025 1511  Last data filed at 6/25/2025 1320  Gross per 24 hour   Intake 1220 ml   Output 1945 ml   Net -725 ml       Physical Exam:   General Appearance:    No acute distress, alert and oriented x4   Lungs:     Clear to auscultation bilaterally     Heart:    Regular rhythm and normal rate.  No murmurs, gallops, or  rubs.   Abdomen:     Soft, nontender, nondistended.    Extremities:   No clubbing, cyanosis, or edema.     Results Review:    Results from last 7 days   Lab Units 06/25/25  0617   SODIUM mmol/L 138   POTASSIUM mmol/L 5.0   CHLORIDE mmol/L 107   CO2 mmol/L 21.5*   BUN mg/dL 26.0*   CREATININE mg/dL 1.06   GLUCOSE mg/dL 95   CALCIUM mg/dL 8.5*     Results from last 7 days   Lab Units 06/23/25 2125 06/23/25 2029   HSTROP T ng/L 8 9     Results from last 7 days   Lab Units 06/25/25  0617   WBC 10*3/mm3 6.93   HEMOGLOBIN g/dL 14.3   HEMATOCRIT % 42.9   PLATELETS 10*3/mm3 223             Results from last 7 days   Lab Units 06/25/25  0617   MAGNESIUM mg/dL 2.3           I reviewed the patient's new clinical results.        Assessment:  1.  Syncope, secondary to hypotension on admission  2.  History of severe hypertension in the past  3.  Status post loop recorder placement in January 2024 (noted to have  13-second pause at 1 point, but felt to be vasovagal and pacemaker not recommended)  4.  Paroxysmal atrial fibrillation (low burden)  5.  History of left cerebellar CVA in August 2023  6.  GERD with a history of Lepe's esophagus  7.  Coronary artery calcification  8.  Obstructive sleep apnea    Plan:  -The patient has a Watchman procedure pending next month.  He is not anticoagulated because of recurrent orthostasis and falls.    -Strongly suspect he had volume depletion which contributed to the current events.    -Slowly reintroduce blood pressure medications.  He is on Coreg.  I added back half of his normal dose of amlodipine at 5 mg/day.  I am going to continue to hold the spironolactone and lisinopril today.    -No acute events noted on loop recorder.    -Spoke with Dr. Siddiqi.  We are going to watch him 1 more day and see how his blood pressure trends.  Potentially discharge tomorrow.    Carlos Foster MD  06/25/25  15:11 EDT

## 2025-06-26 ENCOUNTER — READMISSION MANAGEMENT (OUTPATIENT)
Dept: CALL CENTER | Facility: HOSPITAL | Age: 60
End: 2025-06-26
Payer: COMMERCIAL

## 2025-06-26 VITALS
HEIGHT: 74 IN | BODY MASS INDEX: 29.09 KG/M2 | RESPIRATION RATE: 18 BRPM | SYSTOLIC BLOOD PRESSURE: 146 MMHG | TEMPERATURE: 97.3 F | WEIGHT: 226.63 LBS | HEART RATE: 69 BPM | DIASTOLIC BLOOD PRESSURE: 96 MMHG | OXYGEN SATURATION: 96 %

## 2025-06-26 LAB
ANION GAP SERPL CALCULATED.3IONS-SCNC: 8.9 MMOL/L (ref 5–15)
BUN SERPL-MCNC: 19 MG/DL (ref 6–20)
BUN/CREAT SERPL: 18.8 (ref 7–25)
CALCIUM SPEC-SCNC: 8.8 MG/DL (ref 8.6–10.5)
CHLORIDE SERPL-SCNC: 107 MMOL/L (ref 98–107)
CO2 SERPL-SCNC: 23.1 MMOL/L (ref 22–29)
CREAT SERPL-MCNC: 1.01 MG/DL (ref 0.76–1.27)
DEPRECATED RDW RBC AUTO: 38.3 FL (ref 37–54)
EGFRCR SERPLBLD CKD-EPI 2021: 86.2 ML/MIN/1.73
ERYTHROCYTE [DISTWIDTH] IN BLOOD BY AUTOMATED COUNT: 12.5 % (ref 12.3–15.4)
GLUCOSE SERPL-MCNC: 98 MG/DL (ref 65–99)
HCT VFR BLD AUTO: 42.4 % (ref 37.5–51)
HGB BLD-MCNC: 14.2 G/DL (ref 13–17.7)
MCH RBC QN AUTO: 28.6 PG (ref 26.6–33)
MCHC RBC AUTO-ENTMCNC: 33.5 G/DL (ref 31.5–35.7)
MCV RBC AUTO: 85.5 FL (ref 79–97)
PLATELET # BLD AUTO: 227 10*3/MM3 (ref 140–450)
PMV BLD AUTO: 9.5 FL (ref 6–12)
POTASSIUM SERPL-SCNC: 4.6 MMOL/L (ref 3.5–5.2)
RBC # BLD AUTO: 4.96 10*6/MM3 (ref 4.14–5.8)
SODIUM SERPL-SCNC: 139 MMOL/L (ref 136–145)
WBC NRBC COR # BLD AUTO: 8.44 10*3/MM3 (ref 3.4–10.8)

## 2025-06-26 PROCEDURE — 99214 OFFICE O/P EST MOD 30 MIN: CPT | Performed by: NURSE PRACTITIONER

## 2025-06-26 PROCEDURE — G0378 HOSPITAL OBSERVATION PER HR: HCPCS

## 2025-06-26 PROCEDURE — 85027 COMPLETE CBC AUTOMATED: CPT | Performed by: STUDENT IN AN ORGANIZED HEALTH CARE EDUCATION/TRAINING PROGRAM

## 2025-06-26 PROCEDURE — 80048 BASIC METABOLIC PNL TOTAL CA: CPT | Performed by: STUDENT IN AN ORGANIZED HEALTH CARE EDUCATION/TRAINING PROGRAM

## 2025-06-26 RX ORDER — AMLODIPINE BESYLATE 5 MG/1
5 TABLET ORAL ONCE
Status: COMPLETED | OUTPATIENT
Start: 2025-06-26 | End: 2025-06-26

## 2025-06-26 RX ORDER — LISINOPRIL 40 MG/1
20 TABLET ORAL DAILY
Start: 2025-06-26

## 2025-06-26 RX ORDER — AMLODIPINE BESYLATE 10 MG/1
10 TABLET ORAL
Status: DISCONTINUED | OUTPATIENT
Start: 2025-06-27 | End: 2025-06-26 | Stop reason: HOSPADM

## 2025-06-26 RX ORDER — LISINOPRIL 20 MG/1
20 TABLET ORAL
Status: DISCONTINUED | OUTPATIENT
Start: 2025-06-26 | End: 2025-06-26 | Stop reason: HOSPADM

## 2025-06-26 RX ADMIN — LISINOPRIL 20 MG: 20 TABLET ORAL at 11:32

## 2025-06-26 RX ADMIN — Medication 100 MG: at 08:59

## 2025-06-26 RX ADMIN — AMLODIPINE BESYLATE 5 MG: 5 TABLET ORAL at 11:32

## 2025-06-26 RX ADMIN — ASPIRIN 81 MG CHEWABLE TABLET 81 MG: 81 TABLET CHEWABLE at 08:59

## 2025-06-26 RX ADMIN — Medication 500 MCG: at 08:59

## 2025-06-26 RX ADMIN — PANTOPRAZOLE SODIUM 40 MG: 40 TABLET, DELAYED RELEASE ORAL at 06:36

## 2025-06-26 RX ADMIN — Medication 10 ML: at 08:59

## 2025-06-26 RX ADMIN — AMLODIPINE BESYLATE 5 MG: 5 TABLET ORAL at 08:59

## 2025-06-26 RX ADMIN — CARVEDILOL 12.5 MG: 12.5 TABLET, FILM COATED ORAL at 08:59

## 2025-06-26 RX ADMIN — FOLIC ACID 1 MG: 1 TABLET ORAL at 08:59

## 2025-06-26 RX ADMIN — BUSPIRONE HYDROCHLORIDE 10 MG: 10 TABLET ORAL at 06:36

## 2025-06-26 RX ADMIN — VILAZODONE HYDROCHLORIDE 40 MG: 40 TABLET, FILM COATED ORAL at 08:59

## 2025-06-26 NOTE — CASE MANAGEMENT/SOCIAL WORK
Case Management Discharge Note      Final Note: Home, family to transport         Selected Continued Care - Discharged on 6/26/2025 Admission date: 6/23/2025 - Discharge disposition: Home or Self Care      Destination    No services have been selected for the patient.                Durable Medical Equipment    No services have been selected for the patient.                Dialysis/Infusion    No services have been selected for the patient.                Home Medical Care    No services have been selected for the patient.                Therapy    No services have been selected for the patient.                Community Resources    No services have been selected for the patient.                Community & DME    No services have been selected for the patient.                    Transportation Services  Transportation: Private Transportation  Private: Car    Final Discharge Disposition Code: 01 - home or self-care

## 2025-06-26 NOTE — DISCHARGE SUMMARY
Patient Name: Flako Coreas Sr.  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        [facility]     [facility]o  orthostatic hypotension, syncope, blood pressure hypertension, PAF not on the anticoagulation due to recurrent falls, embolic CVA with residual ataxia, anxiety/depression, GERD, presented to the ED with syncopal episode.      ALLISON, resolved  - Secondary to decreased volume status/dehydration and hypotension in the setting of multiple BP meds/diuretics.  - Creatinine on admission 2.17, baseline creatinine around 0.8-1  -BP meds were initially held, was initiated on IV fluids, creatinine gradually improved, blood pressure meds are gradually resumed.  - Creatinine improved and will remain stable at baseline.  -Recommend repeat BMP in 1 week to monitor renal function     Recurrent syncope  History of orthostatic hypotension  Labile essential hypertension  -current syncope most likely due to the dehydration and ongoing use of BP meds which also led to ALLISON as above.  Patient reported blood pressure as low as 85 /60 at home\  - Was initiated on IV fluids, BP meds were held  and gradually resumed, cardiology was consulted.  -Patient was discharged on carvedilol 12.5 mg twice daily, amlodipine 10 mg daily,, and lisinopril was reduced to 20 mg daily.  Spironolactone was held.  Will need to follow-up with cardiology in 1 week      At the time of discharge patient was told to take all medications as prescribed, keep all follow-up appointments, and call their doctor or return to the hospital with any worsening or concerning symptoms.       -        Day of Discharge     Subjective:  Patient seen this morning.  Feels about the same, no symptoms.  No dizziness.  Has been able to ambulate.  Eager to be discharged home.    Physical Exam:  Temp:  [97.3 °F (36.3 °C)-97.9 °F (36.6 °C)] 97.3 °F (36.3 °C)  Heart Rate:  [60-70] 69  Resp:  [15-18] 18  BP: (126-165)/() 146/96  Body mass index is 29.09 kg/m².  Physical Exam    General: Alert, lying in bed, not in distress,  HEENT: Normocephalic, atraumatic  CV: Regular rate and rhythm, no murmurs rubs or gallops  Lungs: Clear to auscultation bilaterally, no crackles or wheezes  Abdomen: Soft, nontender, nondistended  Extremities: No significant peripheral edema , no cyanosis     Consultants     Consult Orders (all) (From admission, onward)       Start     Ordered    06/24/25 0045  Inpatient Cardiology Consult  Once        Specialty:  Cardiology  Provider:  Saúl Be MD    06/24/25 0047    06/23/25 2221  [facility] (on-call MD unless specified) Details  Once,   Status:  Canceled        Specialty:  Hospitalist  Provider:  (Not yet assigned)    06/23/25 2220                  Procedures     * Surgery not found *      Imaging Results (All)       Procedure Component Value Units Date/Time    CT Cervical Spine Without Contrast [[id number]] Collected: 06/23/25 2146     Updated: 06/23/25 2152    Narrative:      CT OF THE CERVICAL SPINE     HISTORY: Fall     COMPARISON: None available.     TECHNIQUE: Axial CT imaging was obtained through the cervical  spine.  Coronal and sagittal reformatted images were obtained.     FINDINGS:  No acute fracture or subluxation of the cervical spine is seen. There is  mild anterolisthesis of C4 on C5. There is retrolisthesis of C5 on C6.  Intervertebral disc space narrowing is noted at multiple levels. There  is no prevertebral soft tissue swelling. Images through the lung bases  are clear. Canal stenosis is most significant at C6-C7 and C5-C6. Neural  foraminal narrowing is noted at multiple levels, but is most significant  at C5-C6 bilaterally, as well as C6-C7 on the left.       Impression:      No acute fracture or subluxation identified.     Radiation dose reduction techniques were utilized, including automated  exposure control and exposure modulation based on body size.        This report was finalized on 6/23/2025 9:49 PM by Dr. Harini Arreaga M.D on Workstation: [id number]       CT Head Without Contrast [[id number]] Collected: 06/23/25 2144     Updated: 06/23/25 2149    Narrative:      CT OF THE HEAD WITHOUT CONTRAST     HISTORY: Syncope     COMPARISON: February 23, 2025     TECHNIQUE: Axial CT imaging was obtained through the brain. No IV  contrast was administered.     FINDINGS:  No acute intracranial hemorrhage is seen. The ventricles are normal in  size. There is no midline shift or mass effect. No calvarial fracture is  seen. There is some mucosal thickening noted within the ethmoid sinuses.  There is also some trace fluid within the left mastoid air cells. The  patient does appear to have a midline parietal scalp hematoma.       Impression:      No acute intracranial findings.     Radiation dose reduction techniques were utilized, including automated  exposure control and exposure modulation based on body size.        This report was finalized on 6/23/2025 9:46 PM by Dr. Harini Arreaga M.D on Workstation: [id number]       XR Chest 1 View [[id number]] Collected: 06/23/25 2115     Updated: 06/23/25 2120     Narrative:      SINGLE VIEW OF THE CHEST     HISTORY: Syncope     COMPARISON: May 21, 2025     FINDINGS:  Heart size is within normal limits. No pneumothorax, pleural effusion,  or acute infiltrate is seen. There is a cardiac loop recorder.       Impression:      No acute findings.     This report was finalized on 6/23/2025 9:17 PM by Dr. Harini Arreaga M.D on Workstation: [id number]             Results for orders placed during the hospital encounter of 02/23/25    Duplex Renal Artery - Bilateral Complete CAR    Interpretation Summary    Normal right renal artery.    Normal left renal artery.    This is actually better seen on the CT angiogram of the chest dated 1/25/2025.    Results for orders placed during the hospital encounter of 01/25/25    Adult Transthoracic Echo Complete W/ Cont if Necessary Per Protocol    Interpretation Summary    Left ventricular ejection fraction appears to be 56 - 60%.    The left ventricular cavity is borderline dilated.    Left ventricular diastolic function was normal.    Estimated right ventricular systolic pressure from tricuspid regurgitation is normal (<35 mmHg).    Pertinent Labs     Results from last 7 days   Lab Units 06/26/25  0555 06/25/25  0617 06/24/25 0405 06/23/25 2029   WBC 10*3/mm3 8.44 6.93 11.63* 12.87*   HEMOGLOBIN g/dL 14.2 14.3 13.6 15.4   PLATELETS 10*3/mm3 227 223 239 307     Results from last 7 days   Lab Units 06/26/25  0555 06/25/25  0617 06/24/25  0405 06/23/25 2029   SODIUM mmol/L 139 138 137 136   POTASSIUM mmol/L 4.6 5.0 4.3 4.6   CHLORIDE mmol/L 107 107 104 100   CO2 mmol/L 23.1 21.5* 22.2 20.9*   BUN mg/dL 19.0 26.0* 61.0* 67.0*   CREATININE mg/dL 1.01 1.06 1.57* 2.17*   GLUCOSE mg/dL 98 95 115* 135*   Estimated Creatinine Clearance: 102 mL/min (by C-G formula based on SCr of 1.01 mg/dL).  Results from last 7 days   Lab Units 06/23/25 2029   ALBUMIN g/dL 4.3   BILIRUBIN mg/dL 0.4   ALK PHOS U/L 72   AST (SGOT) U/L 18   ALT (SGPT) U/L 26  "    Results from last 7 days   Lab Units 06/26/25  0555 06/25/25  0617 06/24/25  0405 06/23/25 2029   CALCIUM mg/dL 8.8 8.5* 8.7 9.5   ALBUMIN g/dL  --   --   --  4.3   MAGNESIUM mg/dL  --  2.3  --   --    PHOSPHORUS mg/dL  --  2.4*  --   --        Results from last 7 days   Lab Units 06/23/25 2125 06/23/25 2029   HSTROP T ng/L 8 9     Results from last 7 days   Lab Units 06/25/25 0617   URIC ACID mg/dL 5.0         Invalid input(s): \"LDLCALC\"          Test Results Pending at Discharge       Discharge Details        Discharge Medications        Changes to Medications        Instructions Start Date   lisinopril 40 MG tablet  Commonly known as: PRINIVIL,ZESTRIL  What changed: how much to take   20 mg, Oral, Daily             Continue These Medications        Instructions Start Date   amLODIPine 10 MG tablet  Commonly known as: NORVASC   10 mg, Oral, Every 24 Hours Scheduled      aspirin 81 MG chewable tablet   81 mg, Daily      atorvastatin 40 MG tablet  Commonly known as: LIPITOR   40 mg, Oral, Nightly      busPIRone 10 MG tablet  Commonly known as: BUSPAR   10 mg, Oral, Every 8 Hours Scheduled      Cariprazine HCl 1.5 MG capsule capsule  Commonly known as: Vraylar   1.5 mg, Oral, Daily      carvedilol 12.5 MG tablet  Commonly known as: COREG   12.5 mg, Oral, 2 Times Daily With Meals      cloNIDine 0.1 MG tablet  Commonly known as: CATAPRES   0.1 mg, Oral, Daily PRN      folic acid 1 MG tablet  Commonly known as: FOLVITE   1 mg, Oral, Daily      pantoprazole 40 MG EC tablet  Commonly known as: PROTONIX   40 mg, Oral, Every Morning Before Breakfast      thiamine 100 MG tablet  Commonly known as: VITAMIN B1   100 mg, Oral, Daily      vilazodone 40 MG tablet tablet  Commonly known as: Viibryd   40 mg, Oral, Daily      vitamin B-12 500 MCG tablet  Commonly known as: CYANOCOBALAMIN   500 mcg, Oral, Daily             Stop These Medications      spironolactone 25 MG tablet  Commonly known as: ALDACTONE              No " Known Allergies    Discharge Disposition:  Home or Self Care      Discharge Diet:  No active diet order      Discharge Activity:   Activity Instructions    Activity as tolerated.           CODE STATUS:    Code Status and Medical Interventions: CPR (Attempt to Resuscitate); Full Support   Ordered at: 06/24/25 0047     Code Status (Patient has no pulse and is not breathing):    CPR (Attempt to Resuscitate)     Medical Interventions (Patient has pulse or is breathing):    Full Support       Future Appointments   Date Time Provider Department Center   7/1/2025 10:30 AM Anna Anand APRN [facility]  [facility]   7/7/2025 10:20 AM Saúl Be MD [facility] CD [id number]   9/4/2025  2:00 PM Lesa Díaz LCSW [facility]  [facility]   9/18/2025  9:00 AM Akash Rodriguez Jr., DO [facility] EPT [facility]   12/19/2025  9:45 AM Chinmay Taylor MD [facility] CD [id number] [facility]      Follow-up Information       Akash Rodriguez Jr., DO .    Specialties: Sports Medicine, Family Medicine, Emergency Medicine  Contact information:  [address]  [place] [address]  [phone]                             Time Spent on Discharge:  Greater than 30 minutes      Ivone Siddiqi MD  [facility]  06/26/25  18:21 EDT               no palpitations/no dyspnea on exertion/no chest pain

## 2025-06-26 NOTE — PROGRESS NOTES
"    Patient Name: Flako Coreas Sr.  :1965  60 y.o.      Patient Care Team:  Akash Rodriguez Jr., DO as PCP - General (Sports Medicine)  Chinmay Taylor MD as Cardiologist (Cardiology)  Pan Leon MD as Consulting Physician (Neurology)  Anna Anand APRN as Nurse Practitioner (Behavioral Health)    Chief Complaint: follow up syncope    Interval History:    BP high this morning.     Objective   Vital Signs  Temp:  [97.9 °F (36.6 °C)-98.6 °F (37 °C)] 97.9 °F (36.6 °C)  Heart Rate:  [64-70] 70  Resp:  [15-18] 15  BP: (126-153)/(82-99) 126/90    Intake/Output Summary (Last 24 hours) at 2025 0745  Last data filed at 2025 0635  Gross per 24 hour   Intake 480 ml   Output 1275 ml   Net -795 ml     Flowsheet Rows      Flowsheet Row First Filed Value   Admission Height 188 cm (74.02\") Documented at 2025   Admission Weight 99.3 kg (218 lb 14.7 oz) Documented at 2025            Physical Exam:   General Appearance:    Alert, cooperative, in no acute distress   Lungs:     Clear to auscultation.  Normal respiratory effort and rate.      Heart:    Regular rhythm and normal rate, normal S1 and S2, no murmurs, gallops or rubs.     Chest Wall:    No abnormalities observed   Abdomen:     Soft, nontender, positive bowel sounds.     Extremities:   no cyanosis, clubbing or edema.  No marked joint deformities.  Adequate musculoskeletal strength.       Results Review:    Results from last 7 days   Lab Units 25  0555   SODIUM mmol/L 139   POTASSIUM mmol/L 4.6   CHLORIDE mmol/L 107   CO2 mmol/L 23.1   BUN mg/dL 19.0   CREATININE mg/dL 1.01   GLUCOSE mg/dL 98   CALCIUM mg/dL 8.8     Results from last 7 days   Lab Units 25   HSTROP T ng/L 8 9     Results from last 7 days   Lab Units 25  0555   WBC 10*3/mm3 8.44   HEMOGLOBIN g/dL 14.2   HEMATOCRIT % 42.4   PLATELETS 10*3/mm3 227         Results from last 7 days   Lab Units 25  0617 "   MAGNESIUM mg/dL 2.3                   Medication Review:   amLODIPine, 5 mg, Oral, Q24H  aspirin, 81 mg, Oral, Daily  atorvastatin, 40 mg, Oral, Nightly  busPIRone, 10 mg, Oral, Q8H  carvedilol, 12.5 mg, Oral, BID With Meals  folic acid, 1 mg, Oral, Daily  pantoprazole, 40 mg, Oral, QAM AC  sodium chloride, 10 mL, Intravenous, Q12H  thiamine, 100 mg, Oral, Daily  vilazodone, 40 mg, Oral, Daily  vitamin B-12, 500 mcg, Oral, Daily              Assessment & Plan      Syncope, due to hypotension and likely volume depletion . No recent antiHTN med changes. Mild ALLISON. Responding favorably to IVF.   Hypertension  History of 13 second pause on loop recorder, felt to be vasovagal and PPM not recommended at that time.   PAF , low burden and none recently. He has not been anticoagulated due to recurrent fainting and falls. He would be a candidate for short term AC and has been referred for left atrial occlusion.   KRIS  History of stroke, suspected embolic.   Coronary artery calcification  Hyperlipidemia with elevated lipoprotein.      Increase amlodipine to home dose 10 mg daily.   Resume lisinopril 20 mg daily. May need to go back to 40 mg but he will monitor closely at home and call with pressures.   Otherwise we will see him back in one week. No objection to discharge.     JANEEN Lara  [facility]  06/26/25  07:45 EDT

## 2025-06-26 NOTE — OUTREACH NOTE
Prep Survey      Flowsheet Row Responses   [facility] patient discharged from? [place]   Is LACE score < 7 ? No   Eligibility [facility]   Date of Admission 06/23/25   Date of Discharge 06/26/25   Discharge Disposition Home or Self Care   Discharge diagnosis Syncope   Does the patient have one of the following disease processes/diagnoses(primary or secondary)? Other   Does the patient have Home health ordered? No   Is there a DME ordered? No   Prep survey completed? Yes            ALMA SOUZA - Registered Nurse

## 2025-06-26 NOTE — PLAN OF CARE
Goal Outcome Evaluation:  Plan of Care Reviewed With: patient           Outcome Evaluation: VSS, no c/o pain or dizziness. Pt walked around unit with SB assist and walker. Pt did not sleep much overnight. Hopeful for D/C tday. Will CTM, safety maintained.

## 2025-06-26 NOTE — CASE MANAGEMENT/SOCIAL WORK
Continued Stay Note  [facility]     Patient Name: Flako Coreas Sr.  MRN: [id number]  Today's Date: 6/26/2025    Admit Date: 6/23/2025    Plan: Home, family to transport   Discharge Plan       Row Name 06/26/25 1206       Plan    Plan Home, family to transport    Plan Comments Met with pt, MD, and careteam at bedside during walking rounds. MD discussed current treatment plan. Pt is to be dc home. Pt denies any dc needs. Family to transport home.                   Discharge Codes    No documentation.                 Expected Discharge Date and Time       Expected Discharge Date Expected Discharge Time    Jun 26, 2025               Aubrie Ealry RN

## 2025-06-27 ENCOUNTER — TRANSITIONAL CARE MANAGEMENT TELEPHONE ENCOUNTER (OUTPATIENT)
Dept: CALL CENTER | Facility: HOSPITAL | Age: 60
End: 2025-06-27
Payer: COMMERCIAL

## 2025-06-27 NOTE — OUTREACH NOTE
Call Center TCM Note      Flowsheet Row Responses   [facility] patient discharged from? [place]   Does the patient have one of the following disease processes/diagnoses(primary or secondary)? Other   TCM attempt successful? Yes   Call start time 1248   Call end time 1251   Discharge diagnosis Syncope   Meds reviewed with patient/caregiver? Yes   Is the patient having any side effects they believe may be caused by any medication additions or changes? No   Does the patient have all medications ordered at discharge? Yes   Is the patient taking all medications as directed (includes completed medication regime)? Yes   Does the patient have an appointment with their PCP within 7-14 days of discharge? No   Nursing Interventions Patient desires to follow up with specialty only   Has home health visited the patient within 72 hours of discharge? N/A   Psychosocial issues? No   Did the patient receive a copy of their discharge instructions? Yes   Nursing interventions Reviewed instructions with patient   What is the patient's perception of their health status since discharge? Improving   Is the patient/caregiver able to teach back signs and symptoms related to disease process for when to call PCP? Yes   Is the patient/caregiver able to teach back signs and symptoms related to disease process for when to call 911? Yes   Is the patient/caregiver able to teach back the hierarchy of who to call/visit for symptoms/problems? PCP, Specialist, Home health nurse, Urgent Care, ED, 911 Yes   If the patient is a current smoker, are they able to teach back resources for cessation? Not a smoker   TCM call completed? Yes   Wrap up additional comments Doing well, denies any questions or concerns, states he will be following up with cardiology.   Call end time 1251   Would this patient benefit from a Referral to Amb Social Work? No   Is the patient interested in additional calls from an ambulatory ? Kay ZHANG  - Registered Nurse    6/27/2025, 12:51 EDT

## 2025-06-27 NOTE — TELEPHONE ENCOUNTER
EK pt LOV 1/19/24.  Patient with hx of intermittent syncope. CVA. Loop recorder. Labile HTN.    Patient is calling because he had a near syncopal episode on Sunday around 3-4 pm.  I asked him what he was going at the time.  He was taking a very hot bath at the time.  He started to feel poorly so he got out and went to lay down for about an hour and then he felt better.  He did not check his BP.  Apple watch would not read at the time.    He was not able to check his BP yesterday, but reports it has been better the past week but no readings to give me.    I advised him not to take anymore baths because they dilate our arteries which can lower BP causing him to faint and that is a safety issue if he is in a tub of water.  He verbalizes understanding.    Can we check is loop just to be sure no arrhthymias?     Sherie Valdez RN  [facility] Triage  02/05/24 09:54 EST     NAME:Vandana Rao                                           :1997                                                   CHIEF COMPLAINT:  Chief Complaint   Patient presents with   • Office Visit         ASSESSMENT/PLAN  1. Moderate persistent asthma without complication (CMD)    2. Thyroid nodule    3. Learning difficulty         1. Thyroid nodule:  - Last ultrasound in  showed a 4 mm nodule.  - Order repeat thyroid ultrasound to monitor changes.    2. Asthma:  - Using Symbicort, 2 puffs twice daily.  - Continue current asthma medication regimen.    3. Hypertension:  - Blood pressure readings are normal.  - Continue current medication regimen and dietary modifications.    4. Disability paperwork:  - Unemployed, not attending school, never held a job.  Mom states Vandana gets very anxious and they have been wanting her to not get bullied so she has remained in the home.  Vandana states she finished high school and did a few years of online courses but then COVID came and she did never go back.  There is no physical disability.  Has never seen a psychiatrist to get a diagnosis.  - Anxiety may contribute to employment difficulties.  - Referral to psychiatrist for evaluation and potential IQ testing to assist with disability paperwork.    Orders Placed This Encounter   • US THYROID   • mycophenolate (CELLCEPT) 500 MG tablet      No follow-ups on file.      HISTORY OF PRESENT ILLNESS:  The patient is a 27-year-old female presenting for evaluation of a thyroid nodule, asthma, and disability paperwork. She is accompanied by her mother.    Thyroid Nodule  - She is concerned about a thyroid nodule, fearing it may have increased in size since the last ultrasound in , which showed a 4 mm nodule.    Blood Pressure  - She reports satisfactory control of her blood pressure with medication and dietary modifications, including reduced salt intake.    Asthma  - She uses Symbicort, 2 puffs twice daily, for asthma  management.  - She discontinued mycophenolate due to lack of efficacy and finds relief from Dupixent for her skin condition.    Disability and Employment  - She is currently unemployed, not enrolled in school, and has never held a job.  - She last attended school in 2020, completing high school in 2017.  - Her mother reports no social security benefits have been sought and attributes her unemployment to anxiety, expressing concern about her ability to cope with work-related stress.    PAST MEDICAL HISTORY:  Past Medical History:   Diagnosis Date   • Asthma (CMD) 12/30/2019   • Bilateral ocular hypertension    • Functional heart murmur    • HTN (hypertension)    • Learning disabilities    • Lichen plano-pilaris    • PCOS (polycystic ovarian syndrome)    • Precocious puberty        SOCIAL HISTORY:  Social History     Socioeconomic History   • Marital status: Single     Spouse name: Not on file   • Number of children: 0   • Years of education: Not on file   • Highest education level: Not on file   Occupational History   • Occupation: college   Tobacco Use   • Smoking status: Never   • Smokeless tobacco: Never   Vaping Use   • Vaping status: never used   Substance and Sexual Activity   • Alcohol use: Never   • Drug use: Never   • Sexual activity: Never   Other Topics Concern   •  Service Not Asked   • Blood Transfusions Not Asked   • Caffeine Concern Yes     Comment: iced coffee occ   • Occupational Exposure Not Asked   • Hobby Hazards Not Asked   • Sleep Concern Not Asked   • Stress Concern Not Asked   • Weight Concern Not Asked   • Special Diet Not Asked   • Back Care Not Asked   • Exercise Yes     Comment: 30 min walking a day   • Bike Helmet Not Asked   • Seat Belt Not Asked   • Self-Exams Not Asked   Social History Narrative   • Not on file     Social Drivers of Health     Financial Resource Strain: Not on file   Food Insecurity: Not on file   Transportation Needs: Not on file   Physical Activity:  Sufficiently Active (12/28/2020)    Exercise Vital Sign    • Days of Exercise per Week: 4 days    • Minutes of Exercise per Session: 60 min   Stress: Low Risk  (3/29/2021)    Stress    • How Stressed: Not on file   Social Connections: Not on file   Feeling safe in your relationship: Not on file       SURGICAL HISTORY:  No past surgical history on file.    FAMILY HISTORY:  Family History   Problem Relation Age of Onset   • Hypertension Mother    • Diabetes Mother    • Glaucoma Mother    • Thyroid Father    • Hypertension Father    • Diabetes Father    • Glaucoma Other         GF, aunt, uncle       ALLERGIES:  ALLERGIES:   Allergen Reactions   • Fish   (Food Or Med) RASH and PRURITUS   • Nuts   (Food) Other (See Comments)       MEDICATIONS:  Current Outpatient Medications   Medication Sig Dispense Refill   • mycophenolate (CELLCEPT) 500 MG tablet Take 1 tablet by mouth in the morning and 1 tablet in the evening. 60 tablet 3   • hydroCHLOROthiazide 12.5 MG tablet Take 1 tablet by mouth daily. 90 tablet 3   • losartan (COZAAR) 100 MG tablet Take 1 tablet by mouth daily. 90 tablet 3   • Dupixent 300 MG/2ML injection [None received]     • mometasone (ELOCON) 0.1 % ointment Apply topically daily. 45 g 3   • albuterol 108 (90 Base) MCG/ACT inhaler INHALE 2 PUFFS BY MOUTH AS NEEDED AND 20 MINUTES BEFORE EXERCISING     • Cholecalciferol 50 mcg (2,000 units) capsule Take by mouth daily.     • triamcinolone (ARISTOCORT) 0.1 % cream Apply topically 2 times daily. 15 g 1   • budesonide-formoterol (SYMBICORT) 160-4.5 MCG/ACT inhaler Inhale 2 puffs into the lungs 2 times daily. 10.2 g 12   • clobetasol (OLUX) 0.05 % foam Apply topically 2 times daily. To scalp 100 g 0   • montelukast (SINGULAIR) 10 MG tablet Take 10 mg by mouth every morning.     • EPINEPHrine 0.3 MG/0.3ML auto-injector U UTD BY MD  1     No current facility-administered medications for this visit.       REVIEW OF SYSTEMS:  Negative other than as listed in  HPI      PHYSICAL EXAMINATION:  Blood pressure 138/80, pulse (!) 106, temperature 96.6 °F (35.9 °C), temperature source Temporal, resp. rate 16, height 5' 2\" (1.575 m), weight 72.5 kg (159 lb 13.3 oz), last menstrual period 06/22/2025.    Constitutional: alert, in no acute distress and current vital signs reviewed.   Head and Face: atraumatic and normocephalic.   Eyes: no discharge, no eyelid swelling and the sclerae were normal.   ENT: normal appearing outer ear  Neck: normal appearing neck and supple neck.   Pulmonary: breath sounds clear to auscultation bilaterally, normal respiratory rate and effort.   Cardiovascular: normal rate, regular rhythm, normal S1, normal S2 and edema was not present in the lower extremities.   Skin, Hair, Nails: normal skin color and pigmentation.       Electronically signed by: Ramesh Gongora MD  6/27/2025    FOR PHYSICIAN USE ONLY - Please note that this report was generated using  voice recognition software.  If you require clarification or feel that  there has been an error in this report please contact me through [employer].

## 2025-06-30 LAB
MC_CV_MDC_IDC_RATE_1: 176
MC_CV_MDC_IDC_RATE_1: 3
MC_CV_MDC_IDC_RATE_1: 30
MC_CV_MDC_IDC_ZONE_ID: 1
MC_CV_MDC_IDC_ZONE_ID: 2
MC_CV_MDC_IDC_ZONE_ID: 3
MC_CV_MDC_IDC_ZONE_ID: 4
MC_CV_MDC_IDC_ZONE_ID: 7
MDC_IDC_MSMT_BATTERY_STATUS: NORMAL
MDC_IDC_PG_IMPLANT_DTM: NORMAL
MDC_IDC_PG_MFG: NORMAL
MDC_IDC_PG_MODEL: NORMAL
MDC_IDC_PG_SERIAL: NORMAL
MDC_IDC_PG_TYPE: NORMAL
MDC_IDC_SESS_DTM: NORMAL
MDC_IDC_SESS_TYPE: NORMAL
MDC_IDC_SET_ZONE_DETECTION_DETAILS: 16
MDC_IDC_SET_ZONE_DETECTION_DETAILS: 4
MDC_IDC_SET_ZONE_STATUS: NORMAL
MDC_IDC_SET_ZONE_TYPE: NORMAL

## 2025-07-01 ENCOUNTER — TELEMEDICINE (OUTPATIENT)
Dept: PSYCHIATRY | Facility: CLINIC | Age: 60
End: 2025-07-01
Payer: COMMERCIAL

## 2025-07-01 DIAGNOSIS — G47.9 SLEEP DISTURBANCES: ICD-10-CM

## 2025-07-01 DIAGNOSIS — Z79.899 MEDICATION MANAGEMENT: ICD-10-CM

## 2025-07-01 DIAGNOSIS — F41.0 PANIC ANXIETY SYNDROME: ICD-10-CM

## 2025-07-01 DIAGNOSIS — F33.2 SEVERE EPISODE OF RECURRENT MAJOR DEPRESSIVE DISORDER, WITHOUT PSYCHOTIC FEATURES: Primary | ICD-10-CM

## 2025-07-01 RX ORDER — LAMOTRIGINE 25 MG/1
TABLET ORAL
Qty: 42 TABLET | Refills: 1 | Status: SHIPPED | OUTPATIENT
Start: 2025-07-01

## 2025-07-01 NOTE — PROGRESS NOTES
This provider is located at the [facility] (through [facility]), [address], [place], [address] using a secure MyChart Video Visit through [employer]. Patient is being seen remotely via telehealth at their home address in [place], and stated they are in a secure environment for this session. The patient's condition being diagnosed/treated is appropriate for telemedicine. The provider identified herself as well as her credentials.   The patient, and/or patients guardian, consent to be seen remotely, and when consent is given they understand that the consent allows for patient identifiable information to be sent to a third party as needed.   They may refuse to be seen remotely at any time. The electronic data is encrypted and password protected, and the patient and/or guardian has been advised of the potential risks to privacy not withstanding such measures.    You have chosen to receive care through a telehealth visit.  Do you consent to use a video/audio connection for your medical care today? Yes        Patient identifiers utilized: Name and date of birth.    Patient verbally confirmed consent for today's encounter:  July 1, 2025    Subjective   Flako Coreas Sr. is a 60 y.o. male who presents today for follow up    Chief Complaint:    Chief Complaint   Patient presents with    Anxiety    Depression    Med Management    Sleeping Problem        Referring Provider: Trell Murphy MD    History of Present Illness:    History of Present Illness  Is a 59-year-old male presenting for a 1 month follow-up related to depression, anxiety, sleep disturbances, and for medication management.  Initial referral was for a mood disorder.  Remains upon Viibryd daily, feels overall this medication has been beneficial.  Unfortunately did not tolerate Vraylar well, experienced restlessness with use even when taking every other day and has thus discontinue medication.  Also has  "discontinued clonidine due to concerns for low blood pressure.  Unfortunately patient did experience a hospitalization, diagnoses include dehydration and syncope.  Patient does not believe these were attributed by psychotropic medications as he states he stopped Vraylar 5 days before the event and clonidine a week before the event.  He is unsure if he was prescribed BuSpar in the hospital, previously was uncertain of efficacy with use.  Denies efficacy with use of the Vraylar or clonidine.  PHQ reduce from 24-21, indicating moderately severe depression which patient states \"it is as high as it could be, I am 9, it is high\" out of 10 on average.  He denies any thoughts or intention or plan of not living.  Denies SI, HI, SIB, or hallucinations currently and is convincing.  RIRI increased from 18-21, states anxiety is \"very high\" and also rates his condition at 9/10 on average.  States he has been \"stressed to the max financially.\"  On further questioning syncope episodes he states this has been \"happening the past couple of years, I get dizzy and hit the dentist.\"  His largest concern today is \"I just need help focusing.\"  Mentions wanting medication to address ADHD but does acknowledge she has not had formal testing or diagnosis related to this, uncertain if this is related to his anxiety.  States he has been \"laying low, watching a lot of movies\" alone at home.  Some cardiac concerns, states he has a cardiac procedure scheduled in the near future due to clotting disorder and inability to take blood thinners according to patient.  States while on Vraylar he was \"pacing and not knowing what to do next.\"  Appetite has been \"okay.\"  He does acknowledge exercising more, trying to get in better physical shape.  Sleeping \"6 to 8 hours some nights, 12-14 others.\"  Did have to cancel sleep evaluation due to financial concerns.  Denies alternative medical status changes.    Patient is , has 1 son living age 26.  Lives " "alone.  Mother unfortunately passed away in  due to COVID.  He was her caregiver for approximately 3 years and acknowledges this as difficult.  Father passed away from a stroke in 2017.  Strained relationship with brother he is currently working through, 1 sister he does not have communication with.  Highest level of education is a bachelor's in criminal justice and a business minor.  Currently unemployed but previously worked as a  for [employer], [employer], and [employer].  States \"I want to get back to work and just concern I am not ready.\"  Confucianism Mormonism preference.  Has had 1 arrest due to an  tag and missing traffic school.  Regarding chemical dependency and substance abuse, patient states \"I have had bouts with alcohol, enjoying it too much, the same with marijuana.\"  Not currently drinking or smoking, last alcohol use .  Denies alternative circumstances of abuse or dependency.  Not currently in therapy but does express interest.  Enjoys the outdoors, modeling, and walking.        The following portions of the patient's history were reviewed and updated as appropriate: allergies, current medications, past family history, past medical history, past social history, past surgical history and problem list.    Past Psychiatric History:  Began Treatment:  Diagnoses:Depression and Anxiety  Psychiatrist:previously  Therapist:previously  Admission History:Denies  Medication Trials:buspar 10 TID (\"I think it helped a little but I don't know made me tired\"), remeron 30 (don't remember), elavil 25 (\"seemed to work very well but worried about addiction\"), klonopin .0125, lexapro (sexual side effects but seemed to work), atarax 25-50 (worked), vraylar 1.5 (pacing), clonidine (hypotension?)  Self Harm: Denies  Suicide Attempts:self interrupted via shooting   Psychosis, Anxiety, Depression: N/A    Past Medical History:  Past Medical History:   Diagnosis Date    ADHD " (attention deficit hyperactivity disorder)     On going issue    Alcohol abuse     Anxiety     Lepe's esophagus     Benign prostatic hyperplasia Surgery in 12/2021    Brain concussion 11/2023    Clotting disorder Intestinal    Depression     Diverticulitis     Diverticulosis     Duodenitis     Enteritis     Failure to thrive (child)     Family history of blood clots     Fracture of wrist 1985    Fracture, foot 2019    GERD (gastroesophageal reflux disease)     Hyperlipidemia     Hypertension     Hypertensive emergency     Implantable loop recorder present 03/12/2025    Irritable bowel syndrome 2017    Low testosterone     Microcytosis     Pancreatitis     Peptic ulcer disease 02/23/2025    Pneumonia     PONV (postoperative nausea and vomiting)     Seasonal affective disorder     Shoulder injury     Stroke     Unexplained weight loss     Visual impairment 8/2023       Substance Abuse History:   Types:Denies all, including illicit  Withdrawal Symptoms:Denies  Longest Period Sober:Not Applicable   AA: Not applicable     Social History:  Social History     Socioeconomic History    Marital status:    Tobacco Use    Smoking status: Never     Passive exposure: Never    Smokeless tobacco: Never   Vaping Use    Vaping status: Never Used   Substance and Sexual Activity    Alcohol use: Not Currently    Drug use: Not Currently     Frequency: 1.0 times per week    Sexual activity: Defer     Partners: Female     Birth control/protection: Other, Birth control pill       Family History:  Family History   Problem Relation Age of Onset    Stroke Mother     Heart disease Mother     Stroke Father     Hyperlipidemia Father     Hypertension Father        Past Surgical History:  Past Surgical History:   Procedure Laterality Date    ABDOMINAL SURGERY      CARDIAC ELECTROPHYSIOLOGY PROCEDURE N/A 01/24/2024    Procedure: Loop insertion- Medtronic LINQ;  Surgeon: Kaela Walker MD;  Location: [facility] INVASIVE LOCATION;   Service: Cardiovascular;  Laterality: N/A;    COLON SURGERY      COLONOSCOPY      COLONOSCOPY N/A 12/11/2022    Procedure: COLONOSCOPY INTO CECUM WITH HOT SNARE POLYPECTOMY;  Surgeon: Albert Gallo MD;  Location:  [facility];  Service: Gastroenterology;  Laterality: N/A;  PRE- GI BLEED  POST- DIVERTICULOSIS, POLYP    COLONOSCOPY N/A 02/14/2024    Procedure: COLONOSCOPY into cecum/terminal ileum;  Surgeon: Albert Gallo MD;  Location:  [facility];  Service: Gastroenterology;  Laterality: N/A;  diverticulosis, hemorrhoids    ENDOSCOPY N/A 12/10/2022    Procedure: ESOPHAGOGASTRODUODENOSCOPY;  Surgeon: Albert Gallo MD;  Location: [facility];  Service: Gastroenterology;  Laterality: N/A;  PRE- DARK STOOLS  POST- BARRETTS ESOPHAGUS    ENDOSCOPY N/A 02/14/2024    Procedure: ESOPHAGOGASTRODUODENOSCOPY with biopsy;  Surgeon: Albert Gallo MD;  Location: [facility];  Service: Gastroenterology;  Laterality: N/A;  long segment wilson's, hiatal hernia    FRACTURE SURGERY Right 1980    for broken arm    FRACTURE SURGERY Right     for broken hand    GASTRECTOMY      HAND SURGERY  1990    INCISION AND DRAINAGE ABSCESS  2015    anal    JOINT REPLACEMENT  2022    Rebuild shoulder    PROSTATE SURGERY      SHOULDER SURGERY  2022    SKIN BIOPSY      SMALL INTESTINE SURGERY      TONSILLECTOMY      TRIGGER POINT INJECTION  2017       Problem List:  Patient Active Problem List   Diagnosis    Mixed anxiety depressive disorder    Hyperlipidemia    Essential hypertension    Diverticulosis    Nodule of spleen    Chronic bilateral lower abdominal pain    Wilson's esophagus    GERD (gastroesophageal reflux disease)    Vertigo    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    History of vertebral artery stenosis    Alcohol abuse    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    Anxiety about health    Vertebral artery stenosis, left    Memory loss    History of alcohol dependence     Elevated lipoprotein(a)    Elevated coronary artery calcium score    PAF (paroxysmal atrial fibrillation)    Alcohol use    Mood disorder    Alcohol withdrawal    History of CVA (cerebrovascular accident)    Peptic ulcer disease    History of diverticulosis    Implantable loop recorder present    ALLISON (acute kidney injury)    Orthostatic hypotension    Syncope       Allergy:   No Known Allergies     Current Medications:   Current Outpatient Medications   Medication Sig Dispense Refill    amLODIPine (NORVASC) 10 MG tablet Take 1 tablet by mouth Daily. 90 tablet 3    aspirin 81 MG chewable tablet Chew 1 tablet Daily.      atorvastatin (LIPITOR) 40 MG tablet Take 1 tablet by mouth Every Night. 90 tablet 3    busPIRone (BUSPAR) 10 MG tablet Take 1 tablet by mouth Every 8 (Eight) Hours. 90 tablet 0    carvedilol (COREG) 12.5 MG tablet Take 1 tablet by mouth 2 (Two) Times a Day With Meals. 180 tablet 3    cloNIDine (CATAPRES) 0.1 MG tablet Take 1 tablet by mouth Daily As Needed (anxiety). 30 tablet 0    folic acid (FOLVITE) 1 MG tablet Take 1 tablet by mouth Daily. 30 tablet 0    lamoTRIgine (LaMICtal) 25 MG tablet Take 1 tab by mouth daily x 2 weeks, if tolerated without discussed side effects increase to 1 tab by mouth twice daily thereafter. 42 tablet 1    lisinopril (PRINIVIL,ZESTRIL) 40 MG tablet Take 0.5 tablets by mouth Daily.      pantoprazole (PROTONIX) 40 MG EC tablet Take 1 tablet by mouth Every Morning Before Breakfast. 30 tablet 0    thiamine (VITAMIN B1) 100 MG tablet Take 1 tablet by mouth Daily. 30 tablet 0    vilazodone (Viibryd) 40 MG tablet tablet Take 1 tablet by mouth Daily. 30 tablet 6    vitamin B-12 (CYANOCOBALAMIN) 500 MCG tablet Take 1 tablet by mouth Daily. 90 tablet 0     No current facility-administered medications for this visit.       Review of Systems:    Review of Systems   Constitutional: Negative.    HENT: Negative.     Eyes: Negative.    Respiratory: Negative.     Cardiovascular:  "Negative.         \"Heart pause 14 seconds last year\"   Gastrointestinal:  Positive for GERD.   Endocrine: Negative.    Genitourinary: Negative.    Musculoskeletal: Negative.    Skin: Negative.    Allergic/Immunologic: Negative.    Neurological: Negative.  Negative for seizures.   Hematological: Negative.    Psychiatric/Behavioral:  Positive for decreased concentration, dysphoric mood, depressed mood and stress. The patient is nervous/anxious.          Physical Exam:   Physical Exam  Constitutional:       Appearance: Normal appearance.   HENT:      Head: Normocephalic.      Nose: Nose normal.   Musculoskeletal:         General: Normal range of motion.      Cervical back: Normal range of motion.   Neurological:      Mental Status: He is alert.   Psychiatric:         Attention and Perception: Attention and perception normal.         Mood and Affect: Mood is anxious and depressed. Affect is flat.         Speech: Speech normal.         Behavior: Behavior normal. Behavior is cooperative.         Thought Content: Thought content normal.         Cognition and Memory: Cognition and memory normal.         Judgment: Judgment normal.         Vitals:  There were no vitals taken for this visit. There is no height or weight on file to calculate BMI.  Due to extenuating circumstances and possible current health risks associated with the patient being present in a clinical setting (with current health restrictions in place in regards to possible COVID 19 transmission/exposure), the patient was seen remotely today via a MyChart Video Visit through [employer].  Unable to obtain vital signs due to nature of remote visit.  Height stated at 6ft2 inches.  Weight stated at 215 pounds.    Last 3 Blood Pressure Readings:  BP Readings from Last 3 Encounters:   06/26/25 146/96   06/18/25 120/80   05/21/25 155/90       PHQ-9 Score:   PHQ-9 Total Score:  PHQ-9 Depression Screening  Little interest or pleasure in doing things? More than half the days "   Feeling down, depressed, or hopeless? Nearly every day   PHQ-2 Total Score 5   Trouble falling or staying asleep, or sleeping too much? Nearly every day   Feeling tired or having little energy? More than half the days   Poor appetite or overeating? More than half the days   Feeling bad about yourself - or that you are a failure or have let yourself or your family down? Nearly every day   Trouble concentrating on things, such as reading the newspaper or watching television? Nearly every day   Moving or speaking so slowly that other people could have noticed? Or the opposite - being so fidgety or restless that you have been moving around a lot more than usual? Nearly every day     Thoughts that you would be better off dead, or of hurting yourself in some way? Not at all   PHQ-9 Total Score 21   If you checked off any problems, how difficult have these problems made it for you to do your work, take care of things at home, or get along with other people? Very difficult           RIRI-7 Score:   Feeling nervous, anxious or on edge: (Patient-Rptd) Nearly every day  Not being able to stop or control worrying: (Patient-Rptd) Nearly every day  Worrying too much about different things: (Patient-Rptd) Nearly every day  Trouble Relaxing: (Patient-Rptd) Nearly every day  Being so restless that it is hard to sit still: (Patient-Rptd) Nearly every day  Feeling afraid as if something awful might happen: (Patient-Rptd) Nearly every day  Becoming easily annoyed or irritable: (Patient-Rptd) Nearly every day  RIRI 7 Total Score: (Patient-Rptd) 21  If you checked any problems, how difficult have these problems made it for you to do your work, take care of things at home, or get along with other people: (Patient-Rptd) Extremely difficult     Mental Status Exam:   Hygiene:   good  Cooperation:  Guarded  Eye Contact:  Poor  Psychomotor Behavior:  Restless  Affect:  Blunted  Mood: sad and anxious  Hopelessness: 10  Speech:  Normal  Thought  Process:  Goal directed and Linear  Thought Content:  Normal and Mood congruent  Suicidal:  None  Homicidal:  None  Hallucinations:  Not demonstrated today  Delusion:  None  Memory:  Deficits  Orientation:  Grossly intact  Reliability:  fair  Insight:  Fair  Judgement:  Fair  Impulse Control:  Fair  Physical/Medical Issues:  stroke 2023, hypertension, syncope, memory loss, mild KRIS 2024.        Lab Results:   Admission on 06/23/2025, Discharged on 06/26/2025   Component Date Value Ref Range Status    Glucose 06/23/2025 135 (H)  65 - 99 mg/dL Final    BUN 06/23/2025 67.0 (H)  6.0 - 20.0 mg/dL Final    Creatinine 06/23/2025 2.17 (H)  0.76 - 1.27 mg/dL Final    Sodium 06/23/2025 136  136 - 145 mmol/L Final    Potassium 06/23/2025 4.6  3.5 - 5.2 mmol/L Final    Chloride 06/23/2025 100  98 - 107 mmol/L Final    CO2 06/23/2025 20.9 (L)  22.0 - 29.0 mmol/L Final    Calcium 06/23/2025 9.5  8.6 - 10.5 mg/dL Final    Total Protein 06/23/2025 7.4  6.0 - 8.5 g/dL Final    Albumin 06/23/2025 4.3  3.5 - 5.2 g/dL Final    ALT (SGPT) 06/23/2025 26  1 - 41 U/L Final    AST (SGOT) 06/23/2025 18  1 - 40 U/L Final    Alkaline Phosphatase 06/23/2025 72  39 - 117 U/L Final    Total Bilirubin 06/23/2025 0.4  0.0 - 1.2 mg/dL Final    Globulin 06/23/2025 3.1  gm/dL Final    A/G Ratio 06/23/2025 1.4  g/dL Final    BUN/Creatinine Ratio 06/23/2025 30.9 (H)  7.0 - 25.0 Final    Anion Gap 06/23/2025 15.1 (H)  5.0 - 15.0 mmol/L Final    eGFR 06/23/2025 34.4 (L)  >60.0 mL/min/1.73 Final    HS Troponin T 06/23/2025 9  <22 ng/L Final    QT Interval 06/23/2025 429  ms Final    QTC Interval 06/23/2025 437  ms Final    WBC 06/23/2025 12.87 (H)  3.40 - 10.80 10*3/mm3 Final    RBC 06/23/2025 5.38  4.14 - 5.80 10*6/mm3 Final    Hemoglobin 06/23/2025 15.4  13.0 - 17.7 g/dL Final    Hematocrit 06/23/2025 46.7  37.5 - 51.0 % Final    MCV 06/23/2025 86.8  79.0 - 97.0 fL Final    MCH 06/23/2025 28.6  26.6 - 33.0 pg Final    MCHC 06/23/2025 33.0  31.5 - 35.7  g/dL Final    RDW 06/23/2025 13.0  12.3 - 15.4 % Final    RDW-SD 06/23/2025 40.3  37.0 - 54.0 fl Final    MPV 06/23/2025 9.3  6.0 - 12.0 fL Final    Platelets 06/23/2025 307  140 - 450 10*3/mm3 Final    Neutrophil % 06/23/2025 64.1  42.7 - 76.0 % Final    Lymphocyte % 06/23/2025 21.6  19.6 - 45.3 % Final    Monocyte % 06/23/2025 7.9  5.0 - 12.0 % Final    Eosinophil % 06/23/2025 5.4  0.3 - 6.2 % Final    Basophil % 06/23/2025 0.5  0.0 - 1.5 % Final    Immature Grans % 06/23/2025 0.5  0.0 - 0.5 % Final    Neutrophils, Absolute 06/23/2025 8.25 (H)  1.70 - 7.00 10*3/mm3 Final    Lymphocytes, Absolute 06/23/2025 2.78  0.70 - 3.10 10*3/mm3 Final    Monocytes, Absolute 06/23/2025 1.02 (H)  0.10 - 0.90 10*3/mm3 Final    Eosinophils, Absolute 06/23/2025 0.70 (H)  0.00 - 0.40 10*3/mm3 Final    Basophils, Absolute 06/23/2025 0.06  0.00 - 0.20 10*3/mm3 Final    Immature Grans, Absolute 06/23/2025 0.06 (H)  0.00 - 0.05 10*3/mm3 Final    nRBC 06/23/2025 0.0  0.0 - 0.2 /100 WBC Final    HS Troponin T 06/23/2025 8  <22 ng/L Final    Troponin T Numeric Delta 06/23/2025 -1  Abnormal if >/=3 ng/L Final    Glucose 06/24/2025 115 (H)  65 - 99 mg/dL Final    BUN 06/24/2025 61.0 (H)  6.0 - 20.0 mg/dL Final    Creatinine 06/24/2025 1.57 (H)  0.76 - 1.27 mg/dL Final    Sodium 06/24/2025 137  136 - 145 mmol/L Final    Potassium 06/24/2025 4.3  3.5 - 5.2 mmol/L Final    Chloride 06/24/2025 104  98 - 107 mmol/L Final    CO2 06/24/2025 22.2  22.0 - 29.0 mmol/L Final    Calcium 06/24/2025 8.7  8.6 - 10.5 mg/dL Final    BUN/Creatinine Ratio 06/24/2025 38.9 (H)  7.0 - 25.0 Final    Anion Gap 06/24/2025 10.8  5.0 - 15.0 mmol/L Final    eGFR 06/24/2025 50.8 (L)  >60.0 mL/min/1.73 Final    WBC 06/24/2025 11.63 (H)  3.40 - 10.80 10*3/mm3 Final    RBC 06/24/2025 4.74  4.14 - 5.80 10*6/mm3 Final    Hemoglobin 06/24/2025 13.6  13.0 - 17.7 g/dL Final    Hematocrit 06/24/2025 42.1  37.5 - 51.0 % Final    MCV 06/24/2025 88.8  79.0 - 97.0 fL Final    MCH  06/24/2025 28.7  26.6 - 33.0 pg Final    MCHC 06/24/2025 32.3  31.5 - 35.7 g/dL Final    RDW 06/24/2025 12.7  12.3 - 15.4 % Final    RDW-SD 06/24/2025 41.3  37.0 - 54.0 fl Final    MPV 06/24/2025 9.7  6.0 - 12.0 fL Final    Platelets 06/24/2025 239  140 - 450 10*3/mm3 Final    Ethanol 06/23/2025 <10  0 - 10 mg/dL Final    Ethanol % 06/23/2025 <0.010  % Final    Glucose 06/25/2025 95  65 - 99 mg/dL Final    BUN 06/25/2025 26.0 (H)  6.0 - 20.0 mg/dL Final    Creatinine 06/25/2025 1.06  0.76 - 1.27 mg/dL Final    Sodium 06/25/2025 138  136 - 145 mmol/L Final    Potassium 06/25/2025 5.0  3.5 - 5.2 mmol/L Final    Slight hemolysis detected by analyzer. Result may be falsely elevated.    Chloride 06/25/2025 107  98 - 107 mmol/L Final    CO2 06/25/2025 21.5 (L)  22.0 - 29.0 mmol/L Final    Calcium 06/25/2025 8.5 (L)  8.6 - 10.5 mg/dL Final    BUN/Creatinine Ratio 06/25/2025 24.5  7.0 - 25.0 Final    Anion Gap 06/25/2025 9.5  5.0 - 15.0 mmol/L Final    eGFR 06/25/2025 81.3  >60.0 mL/min/1.73 Final    Uric Acid 06/25/2025 5.0  3.4 - 7.0 mg/dL Final    WBC 06/25/2025 6.93  3.40 - 10.80 10*3/mm3 Final    RBC 06/25/2025 4.97  4.14 - 5.80 10*6/mm3 Final    Hemoglobin 06/25/2025 14.3  13.0 - 17.7 g/dL Final    Hematocrit 06/25/2025 42.9  37.5 - 51.0 % Final    MCV 06/25/2025 86.3  79.0 - 97.0 fL Final    MCH 06/25/2025 28.8  26.6 - 33.0 pg Final    MCHC 06/25/2025 33.3  31.5 - 35.7 g/dL Final    RDW 06/25/2025 13.0  12.3 - 15.4 % Final    RDW-SD 06/25/2025 40.4  37.0 - 54.0 fl Final    MPV 06/25/2025 9.8  6.0 - 12.0 fL Final    Platelets 06/25/2025 223  140 - 450 10*3/mm3 Final    Magnesium 06/25/2025 2.3  1.6 - 2.6 mg/dL Final    Phosphorus 06/25/2025 2.4 (L)  2.5 - 4.5 mg/dL Final    Glucose 06/26/2025 98  65 - 99 mg/dL Final    BUN 06/26/2025 19.0  6.0 - 20.0 mg/dL Final    Creatinine 06/26/2025 1.01  0.76 - 1.27 mg/dL Final    Sodium 06/26/2025 139  136 - 145 mmol/L Final    Potassium 06/26/2025 4.6  3.5 - 5.2 mmol/L Final     Chloride 06/26/2025 107  98 - 107 mmol/L Final    CO2 06/26/2025 23.1  22.0 - 29.0 mmol/L Final    Calcium 06/26/2025 8.8  8.6 - 10.5 mg/dL Final    BUN/Creatinine Ratio 06/26/2025 18.8  7.0 - 25.0 Final    Anion Gap 06/26/2025 8.9  5.0 - 15.0 mmol/L Final    eGFR 06/26/2025 86.2  >60.0 mL/min/1.73 Final    WBC 06/26/2025 8.44  3.40 - 10.80 10*3/mm3 Final    RBC 06/26/2025 4.96  4.14 - 5.80 10*6/mm3 Final    Hemoglobin 06/26/2025 14.2  13.0 - 17.7 g/dL Final    Hematocrit 06/26/2025 42.4  37.5 - 51.0 % Final    MCV 06/26/2025 85.5  79.0 - 97.0 fL Final    MCH 06/26/2025 28.6  26.6 - 33.0 pg Final    MCHC 06/26/2025 33.5  31.5 - 35.7 g/dL Final    RDW 06/26/2025 12.5  12.3 - 15.4 % Final    RDW-SD 06/26/2025 38.3  37.0 - 54.0 fl Final    MPV 06/26/2025 9.5  6.0 - 12.0 fL Final    Platelets 06/26/2025 227  140 - 450 10*3/mm3 Final   Orders Only on 06/24/2025   Component Date Value Ref Range Status    Date Time Interrogation Session 06/24/2025 [id number]   Final    Type Interrogation Session 06/24/2025 Remote   Final    Implantable Pulse Generator Manufa* 06/24/2025 Medtronic   Final    Implantable Pulse Generator Type 06/24/2025 ILR   Final    Implantable Pulse Generator Model 06/24/2025 [id number]   Final    Implantable Pulse Generator Serial* 06/24/2025 [id number]   Final    Implantable Pulse Generator Implan* 06/24/2025 [id number]   Final    Battery Status 06/24/2025 OK   Final    Zone Setting Type Category 06/24/2025 Pa   Final    IDC RATE 1 06/24/2025 30   Final    Zone Setting Detection Details 06/24/2025 4   Final    Zone Setting Status 06/24/2025 On   Final    Zone ID 06/24/2025 1   Final    Zone Setting Type Category 06/24/2025 Pause   Final    IDC RATE 1 06/24/2025 3   Final    Zone Setting Status 06/24/2025 On   Final    Zone ID 06/24/2025 2   Final    Zone Setting Type Category 06/24/2025 AF   Final    Zone Setting Status 06/24/2025 On   Final    Zone ID 06/24/2025 3   Final    Zone Setting Type Category  06/24/2025 AT   Final    Zone Setting Status 06/24/2025 Off   Final    Zone ID 06/24/2025 4   Final    Zone Setting Type Category 06/24/2025 Tachy   Final    IDC RATE 1 06/24/2025 176   Final    Zone Setting Detection Details 06/24/2025 16   Final    Zone Setting Status 06/24/2025 On   Final    Zone ID 06/24/2025 7   Final   Admission on 05/21/2025, Discharged on 05/21/2025   Component Date Value Ref Range Status    QT Interval 05/21/2025 398  ms Final    QTC Interval 05/21/2025 463  ms Final    Glucose 05/21/2025 103 (H)  65 - 99 mg/dL Final    BUN 05/21/2025 25 (H)  6 - 20 mg/dL Final    Creatinine 05/21/2025 1.00  0.76 - 1.27 mg/dL Final    Sodium 05/21/2025 137  136 - 145 mmol/L Final    Potassium 05/21/2025 4.0  3.5 - 5.2 mmol/L Final    Chloride 05/21/2025 103  98 - 107 mmol/L Final    CO2 05/21/2025 21.6 (L)  22.0 - 29.0 mmol/L Final    Calcium 05/21/2025 9.7  8.6 - 10.5 mg/dL Final    Total Protein 05/21/2025 7.6  6.0 - 8.5 g/dL Final    Albumin 05/21/2025 4.5  3.5 - 5.2 g/dL Final    ALT (SGPT) 05/21/2025 23  1 - 41 U/L Final    AST (SGOT) 05/21/2025 19  1 - 40 U/L Final    Alkaline Phosphatase 05/21/2025 79  39 - 117 U/L Final    Total Bilirubin 05/21/2025 0.5  0.0 - 1.2 mg/dL Final    Globulin 05/21/2025 3.1  gm/dL Final    A/G Ratio 05/21/2025 1.5  g/dL Final    BUN/Creatinine Ratio 05/21/2025 25.0  7.0 - 25.0 Final    Anion Gap 05/21/2025 12.4  5.0 - 15.0 mmol/L Final    eGFR 05/21/2025 87.8  >60.0 mL/min/1.73 Final    HS Troponin T 05/21/2025 8  <22 ng/L Final    proBNP 05/21/2025 220.0  0.0 - 900.0 pg/mL Final    Ethanol 05/21/2025 <10  0 - 10 mg/dL Final    Ethanol % 05/21/2025 <0.010  % Final    THC, Screen, Urine 05/21/2025 Positive (A)  Negative Final    Phencyclidine (PCP), Urine 05/21/2025 Negative  Negative Final    Cocaine Screen, Urine 05/21/2025 Negative  Negative Final    Methamphetamine, Ur 05/21/2025 Negative  Negative Final    Opiate Screen 05/21/2025 Negative  Negative Final     Amphetamine Screen, Urine 05/21/2025 Negative  Negative Final    Benzodiazepine Screen, Urine 05/21/2025 Negative  Negative Final    Tricyclic Antidepressants Screen 05/21/2025 Negative  Negative Final    Methadone Screen, Urine 05/21/2025 Negative  Negative Final    Barbiturates Screen, Urine 05/21/2025 Negative  Negative Final    Oxycodone Screen, Urine 05/21/2025 Negative  Negative Final    Buprenorphine, Screen, Urine 05/21/2025 Negative  Negative Final    WBC 05/21/2025 12.41 (H)  3.40 - 10.80 10*3/mm3 Final    RBC 05/21/2025 5.97 (H)  4.14 - 5.80 10*6/mm3 Final    Hemoglobin 05/21/2025 17.2  13.0 - 17.7 g/dL Final    Hematocrit 05/21/2025 52.7 (H)  37.5 - 51.0 % Final    MCV 05/21/2025 88.3  79.0 - 97.0 fL Final    MCH 05/21/2025 28.8  26.6 - 33.0 pg Final    MCHC 05/21/2025 32.6  31.5 - 35.7 g/dL Final    RDW 05/21/2025 12.8  12.3 - 15.4 % Final    RDW-SD 05/21/2025 41.4  37.0 - 54.0 fl Final    MPV 05/21/2025 10.4  6.0 - 12.0 fL Final    Platelets 05/21/2025 234  140 - 450 10*3/mm3 Final    Neutrophil % 05/21/2025 75.0  42.7 - 76.0 % Final    Lymphocyte % 05/21/2025 14.6 (L)  19.6 - 45.3 % Final    Monocyte % 05/21/2025 7.9  5.0 - 12.0 % Final    Eosinophil % 05/21/2025 1.8  0.3 - 6.2 % Final    Basophil % 05/21/2025 0.5  0.0 - 1.5 % Final    Immature Grans % 05/21/2025 0.2  0.0 - 0.5 % Final    Neutrophils, Absolute 05/21/2025 9.31 (H)  1.70 - 7.00 10*3/mm3 Final    Lymphocytes, Absolute 05/21/2025 1.81  0.70 - 3.10 10*3/mm3 Final    Monocytes, Absolute 05/21/2025 0.98 (H)  0.10 - 0.90 10*3/mm3 Final    Eosinophils, Absolute 05/21/2025 0.22  0.00 - 0.40 10*3/mm3 Final    Basophils, Absolute 05/21/2025 0.06  0.00 - 0.20 10*3/mm3 Final    Immature Grans, Absolute 05/21/2025 0.03  0.00 - 0.05 10*3/mm3 Final    nRBC 05/21/2025 0.0  0.0 - 0.2 /100 WBC Final    Fentanyl, Urine 05/21/2025 Negative  Negative Final   Hospital Outpatient Visit on 03/12/2025   Component Date Value Ref Range Status    Glucose  03/12/2025 103 (H)  65 - 99 mg/dL Final    BUN 03/12/2025 17  6 - 20 mg/dL Final    Creatinine 03/12/2025 0.94  0.76 - 1.27 mg/dL Final    Sodium 03/12/2025 137  136 - 145 mmol/L Final    Potassium 03/12/2025 4.2  3.5 - 5.2 mmol/L Final    Chloride 03/12/2025 104  98 - 107 mmol/L Final    CO2 03/12/2025 21.6 (L)  22.0 - 29.0 mmol/L Final    Calcium 03/12/2025 9.4  8.6 - 10.5 mg/dL Final    BUN/Creatinine Ratio 03/12/2025 18.1  7.0 - 25.0 Final    Anion Gap 03/12/2025 11.4  5.0 - 15.0 mmol/L Final    eGFR 03/12/2025 94.6  >60.0 mL/min/1.73 Final   Admission on 02/23/2025, Discharged on 02/27/2025   Component Date Value Ref Range Status    HS Troponin T 02/23/2025 <6  <22 ng/L Final    HS Troponin T 02/23/2025 <6  <22 ng/L Final    Troponin T Numeric Delta 02/23/2025    Final    Unable to calculate.    TSH 02/23/2025 0.775  0.270 - 4.200 uIU/mL Final    VMA, Urine 02/24/2025 10.6  Undefined mg/L Final    VMA 24 Hour 02/24/2025 8.0 (H)  0.0 - 7.5 mg/24 hr Final    Epinephrine, Urine 02/24/2025 23  Undefined ug/L Final    Epinephrine, 24H Ur 02/24/2025 17  0 - 20 ug/24 hr Final    Norepinephrine, Urine 02/24/2025 131  Undefined ug/L Final    Norepinephrine, 24H Ur 02/24/2025 98  0 - 135 ug/24 hr Final    Dopamine, Urine 02/24/2025 307  Undefined ug/L Final    Dopamine , 24H Ur 02/24/2025 230  0 - 510 ug/24 hr Final    5-HIAA, Urine 02/24/2025 7.2  Undefined mg/L Final    5-HIAA, 24H Ur 02/24/2025 5.4  0.0 - 14.9 mg/24 hr Final    Aldosterone 02/24/2025 1.8  0.0 - 30.0 ng/dL Final    KID L RIGHT 02/24/2025 12.2  cm Final    KID W RIGHT 02/24/2025 6.4  cm Final    Kid L 02/24/2025 11.3  cm Final    Left kidney width 02/24/2025 4.3  cm Final    RENAL A ORG PSV RIGHT 02/24/2025 72.3  cm/s Final    RENAL A ORG EDV RIGHT 02/24/2025 17.2  cm/s Final    PROX DEMOND A PSV RIGHT 02/24/2025 61.4  cm/s Final    PROX DEMOND A EDV RIGHT 02/24/2025 26.3  cm/s Final    MID DEMOND A PSV RIGHT 02/24/2025 50.2  cm/s Final    MID DEMOND A EDV RIGHT  02/24/2025 19.0  cm/s Final    DIST DEMOND A PSV RIGHT 02/24/2025 36.5  cm/s Final    DIST DEMOND A EDV RIGHT 02/24/2025 14.1  cm/s Final    Hilum Right PSV 02/24/2025 33.6  cm/s Final    Hilum Right EDV 02/24/2025 15.6  cm/s Final    RENAL A ORG PSV LEFT 02/24/2025 47.5  cm/s Final    RENAL A ORG EDV LEFT 02/24/2025 8.7  cm/s Final    PROX DEMOND A PSV LEFT 02/24/2025 32.0  cm/s Final    PROX DEMOND A EDV LEFT 02/24/2025 12.4  cm/s Final    MID DEMOND A PSV LEFT 02/24/2025 34.3  cm/s Final    MID DEMOND A EDV LEFT 02/24/2025 13.9  cm/s Final    DIST DEMOND A PSV LEFT 02/24/2025 36.3  cm/s Final    DIST DEMOND A EDV LEFT 02/24/2025 14.1  cm/s Final    Hilum Left PSV 02/24/2025 44.1  cm/s Final    Hilum Left EDV 02/24/2025 15.3  cm/s Final    Right renal upper parenchyma max 02/24/2025 33.2  cm/s Final    Right renal upper parenchyma min 02/24/2025 15.7  cm/s Final    Aortic Mid PSV 02/24/2025 105.0  cm/s Final    RAR RIGHT 02/24/2025 0.58   Final    Left renal upper parenchyma max 02/24/2025 39.7  cm/s Final    Left renal upper parenchyma min 02/24/2025 15.3  cm/s Final    RAR LEFT 02/24/2025 0.35   Final    Left renal upper parenchyma RI 02/24/2025 0.61   Final    Right renal upper parenchyma RI 02/24/2025 0.53   Final    BH CV VAS BP RIGHT ARM 02/24/2025 132/86  mmHg Final    BH CV VAS BP LEFT ARM 02/24/2025 130/80  mmHg Final    Hemoglobin A1C 02/23/2025 5.50  4.80 - 5.60 % Final    Glucose 02/24/2025 111 (H)  65 - 99 mg/dL Final    BUN 02/24/2025 14  6 - 20 mg/dL Final    Creatinine 02/24/2025 0.82  0.76 - 1.27 mg/dL Final    Sodium 02/24/2025 138  136 - 145 mmol/L Final    Potassium 02/24/2025 3.7  3.5 - 5.2 mmol/L Final    Chloride 02/24/2025 106  98 - 107 mmol/L Final    CO2 02/24/2025 22.5  22.0 - 29.0 mmol/L Final    Calcium 02/24/2025 9.3  8.6 - 10.5 mg/dL Final    BUN/Creatinine Ratio 02/24/2025 17.1  7.0 - 25.0 Final    Anion Gap 02/24/2025 9.5  5.0 - 15.0 mmol/L Final    eGFR 02/24/2025 102.5  >60.0 mL/min/1.73 Final    WBC  02/24/2025 7.65  3.40 - 10.80 10*3/mm3 Final    RBC 02/24/2025 5.54  4.14 - 5.80 10*6/mm3 Final    Hemoglobin 02/24/2025 15.5  13.0 - 17.7 g/dL Final    Hematocrit 02/24/2025 46.6  37.5 - 51.0 % Final    MCV 02/24/2025 84.1  79.0 - 97.0 fL Final    MCH 02/24/2025 28.0  26.6 - 33.0 pg Final    MCHC 02/24/2025 33.3  31.5 - 35.7 g/dL Final    RDW 02/24/2025 14.0  12.3 - 15.4 % Final    RDW-SD 02/24/2025 43.3  37.0 - 54.0 fl Final    MPV 02/24/2025 9.8  6.0 - 12.0 fL Final    Platelets 02/24/2025 273  140 - 450 10*3/mm3 Final    Neutrophil % 02/24/2025 57.4  42.7 - 76.0 % Final    Lymphocyte % 02/24/2025 26.8  19.6 - 45.3 % Final    Monocyte % 02/24/2025 9.4  5.0 - 12.0 % Final    Eosinophil % 02/24/2025 5.9  0.3 - 6.2 % Final    Basophil % 02/24/2025 0.4  0.0 - 1.5 % Final    Immature Grans % 02/24/2025 0.1  0.0 - 0.5 % Final    Neutrophils, Absolute 02/24/2025 4.39  1.70 - 7.00 10*3/mm3 Final    Lymphocytes, Absolute 02/24/2025 2.05  0.70 - 3.10 10*3/mm3 Final    Monocytes, Absolute 02/24/2025 0.72  0.10 - 0.90 10*3/mm3 Final    Eosinophils, Absolute 02/24/2025 0.45 (H)  0.00 - 0.40 10*3/mm3 Final    Basophils, Absolute 02/24/2025 0.03  0.00 - 0.20 10*3/mm3 Final    Immature Grans, Absolute 02/24/2025 0.01  0.00 - 0.05 10*3/mm3 Final    nRBC 02/24/2025 0.0  0.0 - 0.2 /100 WBC Final    Aldosterone 02/24/2025 3.3  0.0 - 30.0 ng/dL Final    Renin Activity 02/24/2025 0.934  0.167 - 5.380 ng/mL/hr Final    Aldosterone/Renin Ratio 02/24/2025 3.5  0.0 - 30.0 Final                             Units:      ng/dL per ng/mL/hr    Glucose 02/25/2025 99  65 - 99 mg/dL Final    BUN 02/25/2025 15  6 - 20 mg/dL Final    Creatinine 02/25/2025 0.80  0.76 - 1.27 mg/dL Final    Sodium 02/25/2025 142  136 - 145 mmol/L Final    Potassium 02/25/2025 3.8  3.5 - 5.2 mmol/L Final    Chloride 02/25/2025 110 (H)  98 - 107 mmol/L Final    CO2 02/25/2025 21.5 (L)  22.0 - 29.0 mmol/L Final    Calcium 02/25/2025 8.8  8.6 - 10.5 mg/dL Final     BUN/Creatinine Ratio 02/25/2025 18.8  7.0 - 25.0 Final    Anion Gap 02/25/2025 10.5  5.0 - 15.0 mmol/L Final    eGFR 02/25/2025 103.2  >60.0 mL/min/1.73 Final    WBC 02/25/2025 8.35  3.40 - 10.80 10*3/mm3 Final    RBC 02/25/2025 5.41  4.14 - 5.80 10*6/mm3 Final    Hemoglobin 02/25/2025 15.3  13.0 - 17.7 g/dL Final    Hematocrit 02/25/2025 45.6  37.5 - 51.0 % Final    MCV 02/25/2025 84.3  79.0 - 97.0 fL Final    MCH 02/25/2025 28.3  26.6 - 33.0 pg Final    MCHC 02/25/2025 33.6  31.5 - 35.7 g/dL Final    RDW 02/25/2025 13.8  12.3 - 15.4 % Final    RDW-SD 02/25/2025 42.1  37.0 - 54.0 fl Final    MPV 02/25/2025 9.7  6.0 - 12.0 fL Final    Platelets 02/25/2025 243  140 - 450 10*3/mm3 Final    Neutrophil % 02/25/2025 62.1  42.7 - 76.0 % Final    Lymphocyte % 02/25/2025 22.4  19.6 - 45.3 % Final    Monocyte % 02/25/2025 8.1  5.0 - 12.0 % Final    Eosinophil % 02/25/2025 6.6 (H)  0.3 - 6.2 % Final    Basophil % 02/25/2025 0.4  0.0 - 1.5 % Final    Immature Grans % 02/25/2025 0.4  0.0 - 0.5 % Final    Neutrophils, Absolute 02/25/2025 5.19  1.70 - 7.00 10*3/mm3 Final    Lymphocytes, Absolute 02/25/2025 1.87  0.70 - 3.10 10*3/mm3 Final    Monocytes, Absolute 02/25/2025 0.68  0.10 - 0.90 10*3/mm3 Final    Eosinophils, Absolute 02/25/2025 0.55 (H)  0.00 - 0.40 10*3/mm3 Final    Basophils, Absolute 02/25/2025 0.03  0.00 - 0.20 10*3/mm3 Final    Immature Grans, Absolute 02/25/2025 0.03  0.00 - 0.05 10*3/mm3 Final    nRBC 02/25/2025 0.0  0.0 - 0.2 /100 WBC Final    Glucose 02/26/2025 107 (H)  65 - 99 mg/dL Final    BUN 02/26/2025 14  6 - 20 mg/dL Final    Creatinine 02/26/2025 0.86  0.76 - 1.27 mg/dL Final    Sodium 02/26/2025 141  136 - 145 mmol/L Final    Potassium 02/26/2025 3.2 (L)  3.5 - 5.2 mmol/L Final    Chloride 02/26/2025 111 (H)  98 - 107 mmol/L Final    CO2 02/26/2025 20.5 (L)  22.0 - 29.0 mmol/L Final    Calcium 02/26/2025 8.7  8.6 - 10.5 mg/dL Final    BUN/Creatinine Ratio 02/26/2025 16.3  7.0 - 25.0 Final    Anion  Gap 02/26/2025 9.5  5.0 - 15.0 mmol/L Final    eGFR 02/26/2025 101.0  >60.0 mL/min/1.73 Final    WBC 02/26/2025 8.17  3.40 - 10.80 10*3/mm3 Final    RBC 02/26/2025 5.34  4.14 - 5.80 10*6/mm3 Final    Hemoglobin 02/26/2025 15.1  13.0 - 17.7 g/dL Final    Hematocrit 02/26/2025 46.2  37.5 - 51.0 % Final    MCV 02/26/2025 86.5  79.0 - 97.0 fL Final    MCH 02/26/2025 28.3  26.6 - 33.0 pg Final    MCHC 02/26/2025 32.7  31.5 - 35.7 g/dL Final    RDW 02/26/2025 13.9  12.3 - 15.4 % Final    RDW-SD 02/26/2025 44.0  37.0 - 54.0 fl Final    MPV 02/26/2025 10.0  6.0 - 12.0 fL Final    Platelets 02/26/2025 228  140 - 450 10*3/mm3 Final    Neutrophil % 02/26/2025 58.4  42.7 - 76.0 % Final    Lymphocyte % 02/26/2025 24.0  19.6 - 45.3 % Final    Monocyte % 02/26/2025 9.2  5.0 - 12.0 % Final    Eosinophil % 02/26/2025 7.8 (H)  0.3 - 6.2 % Final    Basophil % 02/26/2025 0.4  0.0 - 1.5 % Final    Immature Grans % 02/26/2025 0.2  0.0 - 0.5 % Final    Neutrophils, Absolute 02/26/2025 4.77  1.70 - 7.00 10*3/mm3 Final    Lymphocytes, Absolute 02/26/2025 1.96  0.70 - 3.10 10*3/mm3 Final    Monocytes, Absolute 02/26/2025 0.75  0.10 - 0.90 10*3/mm3 Final    Eosinophils, Absolute 02/26/2025 0.64 (H)  0.00 - 0.40 10*3/mm3 Final    Basophils, Absolute 02/26/2025 0.03  0.00 - 0.20 10*3/mm3 Final    Immature Grans, Absolute 02/26/2025 0.02  0.00 - 0.05 10*3/mm3 Final    nRBC 02/26/2025 0.0  0.0 - 0.2 /100 WBC Final    Potassium 02/26/2025 3.5  3.5 - 5.2 mmol/L Final    Glucose 02/27/2025 97  65 - 99 mg/dL Final    BUN 02/27/2025 10  6 - 20 mg/dL Final    Creatinine 02/27/2025 0.81  0.76 - 1.27 mg/dL Final    Sodium 02/27/2025 145  136 - 145 mmol/L Final    Potassium 02/27/2025 4.5  3.5 - 5.2 mmol/L Final    Chloride 02/27/2025 115 (H)  98 - 107 mmol/L Final    CO2 02/27/2025 20.0 (L)  22.0 - 29.0 mmol/L Final    Calcium 02/27/2025 8.7  8.6 - 10.5 mg/dL Final    BUN/Creatinine Ratio 02/27/2025 12.3  7.0 - 25.0 Final    Anion Gap 02/27/2025 10.0   5.0 - 15.0 mmol/L Final    eGFR 02/27/2025 102.8  >60.0 mL/min/1.73 Final    Normetanephrine 02/27/2025 96.2  0.0 - 244.0 pg/mL Final    Metanephrine 02/27/2025 38.3  0.0 - 88.0 pg/mL Final    WBC 02/27/2025 7.20  3.40 - 10.80 10*3/mm3 Final    RBC 02/27/2025 5.16  4.14 - 5.80 10*6/mm3 Final    Hemoglobin 02/27/2025 14.6  13.0 - 17.7 g/dL Final    Hematocrit 02/27/2025 44.3  37.5 - 51.0 % Final    MCV 02/27/2025 85.9  79.0 - 97.0 fL Final    MCH 02/27/2025 28.3  26.6 - 33.0 pg Final    MCHC 02/27/2025 33.0  31.5 - 35.7 g/dL Final    RDW 02/27/2025 13.6  12.3 - 15.4 % Final    RDW-SD 02/27/2025 42.8  37.0 - 54.0 fl Final    MPV 02/27/2025 10.2  6.0 - 12.0 fL Final    Platelets 02/27/2025 228  140 - 450 10*3/mm3 Final    Neutrophil % 02/27/2025 55.0  42.7 - 76.0 % Final    Lymphocyte % 02/27/2025 28.9  19.6 - 45.3 % Final    Monocyte % 02/27/2025 6.9  5.0 - 12.0 % Final    Eosinophil % 02/27/2025 8.3 (H)  0.3 - 6.2 % Final    Basophil % 02/27/2025 0.6  0.0 - 1.5 % Final    Immature Grans % 02/27/2025 0.3  0.0 - 0.5 % Final    Neutrophils, Absolute 02/27/2025 3.96  1.70 - 7.00 10*3/mm3 Final    Lymphocytes, Absolute 02/27/2025 2.08  0.70 - 3.10 10*3/mm3 Final    Monocytes, Absolute 02/27/2025 0.50  0.10 - 0.90 10*3/mm3 Final    Eosinophils, Absolute 02/27/2025 0.60 (H)  0.00 - 0.40 10*3/mm3 Final    Basophils, Absolute 02/27/2025 0.04  0.00 - 0.20 10*3/mm3 Final    Immature Grans, Absolute 02/27/2025 0.02  0.00 - 0.05 10*3/mm3 Final    nRBC 02/27/2025 0.0  0.0 - 0.2 /100 WBC Final   Admission on 02/23/2025, Discharged on 02/23/2025   Component Date Value Ref Range Status    Glucose 02/23/2025 115 (H)  65 - 99 mg/dL Final    BUN 02/23/2025 15  6 - 20 mg/dL Final    Creatinine 02/23/2025 0.83  0.76 - 1.27 mg/dL Final    Sodium 02/23/2025 138  136 - 145 mmol/L Final    Potassium 02/23/2025 4.1  3.5 - 5.2 mmol/L Final    Chloride 02/23/2025 107  98 - 107 mmol/L Final    CO2 02/23/2025 21.0 (L)  22.0 - 29.0 mmol/L Final     Calcium 02/23/2025 9.3  8.6 - 10.5 mg/dL Final    Total Protein 02/23/2025 7.4  6.0 - 8.5 g/dL Final    Albumin 02/23/2025 4.4  3.5 - 5.2 g/dL Final    ALT (SGPT) 02/23/2025 22  1 - 41 U/L Final    AST (SGOT) 02/23/2025 17  1 - 40 U/L Final    Alkaline Phosphatase 02/23/2025 75  39 - 117 U/L Final    Total Bilirubin 02/23/2025 0.4  0.0 - 1.2 mg/dL Final    Globulin 02/23/2025 3.0  gm/dL Final    A/G Ratio 02/23/2025 1.5  g/dL Final    BUN/Creatinine Ratio 02/23/2025 18.1  7.0 - 25.0 Final    Anion Gap 02/23/2025 10.0  5.0 - 15.0 mmol/L Final    eGFR 02/23/2025 102.1  >60.0 mL/min/1.73 Final    HS Troponin T 02/23/2025 <6  <22 ng/L Final    QT Interval 02/23/2025 388  ms Final    QTC Interval 02/23/2025 414  ms Final    WBC 02/23/2025 7.95  3.40 - 10.80 10*3/mm3 Final    RBC 02/23/2025 5.53  4.14 - 5.80 10*6/mm3 Final    Hemoglobin 02/23/2025 15.7  13.0 - 17.7 g/dL Final    Hematocrit 02/23/2025 47.2  37.5 - 51.0 % Final    MCV 02/23/2025 85.4  79.0 - 97.0 fL Final    MCH 02/23/2025 28.4  26.6 - 33.0 pg Final    MCHC 02/23/2025 33.3  31.5 - 35.7 g/dL Final    RDW 02/23/2025 14.2  12.3 - 15.4 % Final    RDW-SD 02/23/2025 44.1  37.0 - 54.0 fl Final    MPV 02/23/2025 9.7  6.0 - 12.0 fL Final    Platelets 02/23/2025 236  140 - 450 10*3/mm3 Final    Neutrophil % 02/23/2025 60.6  42.7 - 76.0 % Final    Lymphocyte % 02/23/2025 25.5  19.6 - 45.3 % Final    Monocyte % 02/23/2025 7.2  5.0 - 12.0 % Final    Eosinophil % 02/23/2025 6.2  0.3 - 6.2 % Final    Basophil % 02/23/2025 0.4  0.0 - 1.5 % Final    Immature Grans % 02/23/2025 0.1  0.0 - 0.5 % Final    Neutrophils, Absolute 02/23/2025 4.82  1.70 - 7.00 10*3/mm3 Final    Lymphocytes, Absolute 02/23/2025 2.03  0.70 - 3.10 10*3/mm3 Final    Monocytes, Absolute 02/23/2025 0.57  0.10 - 0.90 10*3/mm3 Final    Eosinophils, Absolute 02/23/2025 0.49 (H)  0.00 - 0.40 10*3/mm3 Final    Basophils, Absolute 02/23/2025 0.03  0.00 - 0.20 10*3/mm3 Final    Immature Grans, Absolute  02/23/2025 0.01  0.00 - 0.05 10*3/mm3 Final    nRBC 02/23/2025 0.0  0.0 - 0.2 /100 WBC Final    Extra Tube 02/23/2025 Hold for add-ons.   Final    Auto resulted.    Extra Tube 02/23/2025 Hold for add-ons.   Final    Auto resulted.    Extra Tube 02/23/2025 Hold for add-ons.   Final    Auto resulted   No results displayed because visit has over 200 results.            Assessment & Plan   Problems Addressed this Visit    None  Visit Diagnoses         Severe episode of recurrent major depressive disorder, without psychotic features    -  Primary    Relevant Medications    lamoTRIgine (LaMICtal) 25 MG tablet      Panic anxiety syndrome          Sleep disturbances          Medication management        Relevant Medications    lamoTRIgine (LaMICtal) 25 MG tablet          Diagnoses         Codes Comments      Severe episode of recurrent major depressive disorder, without psychotic features    -  Primary ICD-10-CM: F33.2  ICD-9-CM: 296.33       Panic anxiety syndrome     ICD-10-CM: F41.0  ICD-9-CM: 300.01       Sleep disturbances     ICD-10-CM: G47.9  ICD-9-CM: 780.50       Medication management     ICD-10-CM: Z79.899  ICD-9-CM: V58.69             Visit Diagnoses:    ICD-10-CM ICD-9-CM   1. Severe episode of recurrent major depressive disorder, without psychotic features  F33.2 296.33   2. Panic anxiety syndrome  F41.0 300.01   3. Sleep disturbances  G47.9 780.50   4. Medication management  Z79.899 V58.69         Continue Viibryd, does not need refills at this point.  BuSpar discontinued, patient not currently taking and is uncertain of efficacy with use.  We will consider reintroducing this medication at a later point.  Weighed risks versus benefits of introducing an alternative antipsychotic to address treatment resistant depression, which I do believe could be a contributing factor to concentration deficits, I informed patient I am not convinced this is ADHD related.  Due to current cardiac concerns and recent syncope this  would be a risky option.  Also did briefly consider Wellbutrin or ability, however due to hypertension these would not currently be appropriate, as well as cardiac risk.  Patient was referred for mood components, thus we will incorporate Lamictal at a conservative dose at 25 mg daily x 2 weeks and if well tolerated increase to 25 mg twice daily thereafter.  Did discuss risk of blood dyscrasias with use of this medication.  Patient to report any signs of excessive bleeding, if severe rash occurs patient was informed to be assessed in the emergency room. Patient is educated upon risks versus benefits as well as side effects and when to seek care in an emergency setting.  Patient verbalized understanding and is agreeable to this treatment option. Instructions sent via my chart regarding new medication at this time.  Follow up this provider in 4 to 6 weeks or sooner if needed.    Discussed medication options and treatment plan of prescribed medication, any off label use of medication, as well as the risks, benefits, any black box warnings including increased suicidality, and side effects including but not limited to potential falls, dizziness, possible impaired driving, GI side effects (change in appetite, abdominal discomfort, nausea, vomiting, diarrhea, and/or constipation), dry mouth, somnolence, sedation, insomnia, activation, agitation, irritation, tremors, abnormal muscle movements or disorders, tardive dyskinesia, akathisia, asthenia, headache, sweating, possible bruising or rare bleeding, electrolyte and/or fluid abnormalities, change in blood pressure/heart rate/and or heart rhythm, hypotension, sexual dysfunction, rare impulse control problems, rare seizures, rare neuroleptic malignant syndrome, increased risk of death and cerebrovascular events, change in blood glucose and increased risk for diabetes, change in triglycerides and cholesterol and increased risk for dyslipidemia,  weight gain, weight gain that  can become problematic to health, skin conditions and reactions, and metabolic adversities among others. Patient and/or guardian are agreeable to call the office with any worsening of symptoms or onset of side effects, or if any concerns or questions arise.  The contact information for the office is made available to the patient and/or guardian. Patient and/or guardian are agreeable to call 911 or go to the nearest ER should they begin having any SI/HI, or if any urgent concerns arise.        GOALS:  Short Term Goals: Patient will be compliant with medication, and patient will have no significant medication related side effects.  Patient will be engaged in psychotherapy as indicated.  Patient will report subjective improvement of symptoms.  Long term goals: To stabilize mood and treat/improve subjective symptoms, the patient will stay out of the hospital, the patient will be at an optimal level of functioning, and the patient will take all medications as prescribed.  The patient/guardian verbalized understanding and agreement with goals that were mutually set.    Discussed practice no show policy, informed patient 3 no shows would results to referral for in-person psychiatry to better assure compliance in addressing mental health.    Discussed limitations to telehealth, if at any point a higher level of care or closer monitoring would be warranted, referral to in person psychiatry would be placed. Also this provider does not make determinations related to disability status. If at any point in time patient feels they need to obtain disability, a referral to a higher level of care with in person psychiatrist will be made to more appropriately navigate determination of disability, whether short or long term is needed.     TREATMENT PLAN: Continue supportive psychotherapy efforts and medications as indicated.  Pharmacological and Non-Pharmacological treatment options discussed during today's visit. Patient/Guardian  acknowledged and verbally consented with current treatment plan and was educated on the importance of compliance with treatment and follow-up appointments.      MEDICATION ISSUES:  Discussed medication options and treatment plan of prescribed medication as well as the risks, benefits, any black box warnings, and side effects including potential falls, possible impaired driving, and metabolic adversities among others. Patient is agreeable to call the office with any worsening of symptoms or onset of side effects, or if any concerns or questions arise.  The contact information for the office is made available to the patient. Patient is agreeable to call 911 or go to the nearest ER should they begin having any SI/HI, or if any urgent concerns arise. No medication side effects or related complaints today.     MEDS ORDERED DURING VISIT:  New Medications Ordered This Visit   Medications    lamoTRIgine (LaMICtal) 25 MG tablet     Sig: Take 1 tab by mouth daily x 2 weeks, if tolerated without discussed side effects increase to 1 tab by mouth twice daily thereafter.     Dispense:  42 tablet     Refill:  1       Follow Up Appointment:   Return in about 4 weeks (around 7/29/2025) for Recheck.             This document has been electronically signed by JANEEN Palmer  July 1, 2025 11:21 EDT    Some of the data in this electronic note has been brought forward from a previous encounter, any necessary changes have been made, it has been reviewed by this APRN, and it is accurate.         Total Time spent by this APRN for today's encounter:  40 total minutes were spent by this APRN on charting/documentation, review of past medical records, direct face to face patient care, coordination of care, and reviewing recent hospitalization records, providing medication education and instructions.       Dictated Utilizing Dragon Dictation: Part of this note may be an electronic transcription/translation of spoken language to printed  text using the Dragon Dictation System.

## 2025-07-07 ENCOUNTER — READMISSION MANAGEMENT (OUTPATIENT)
Dept: CALL CENTER | Facility: HOSPITAL | Age: 60
End: 2025-07-07
Payer: COMMERCIAL

## 2025-07-07 ENCOUNTER — OFFICE VISIT (OUTPATIENT)
Age: 60
End: 2025-07-07
Payer: COMMERCIAL

## 2025-07-07 VITALS
SYSTOLIC BLOOD PRESSURE: 102 MMHG | DIASTOLIC BLOOD PRESSURE: 60 MMHG | BODY MASS INDEX: 29.26 KG/M2 | HEIGHT: 74 IN | HEART RATE: 60 BPM | WEIGHT: 228 LBS

## 2025-07-07 DIAGNOSIS — E78.2 MIXED HYPERLIPIDEMIA: ICD-10-CM

## 2025-07-07 DIAGNOSIS — I10 ESSENTIAL HYPERTENSION: Primary | ICD-10-CM

## 2025-07-07 DIAGNOSIS — Z86.73 HISTORY OF CVA (CEREBROVASCULAR ACCIDENT): ICD-10-CM

## 2025-07-07 DIAGNOSIS — I48.0 PAF (PAROXYSMAL ATRIAL FIBRILLATION): ICD-10-CM

## 2025-07-07 PROCEDURE — 99214 OFFICE O/P EST MOD 30 MIN: CPT | Performed by: INTERNAL MEDICINE

## 2025-07-07 NOTE — PROGRESS NOTES
Flako Coreas .  1965  Date of Office Visit: 07/07/25  Encounter Provider: Saúl Be MD  Place of Service: [facility] CARDIOLOGY      CHIEF COMPLAINT:  Paroxysmal atrial fibrillation  Recurrent syncope   Prior CVA      HISTORY OF PRESENT ILLNESS:  60-year-old male with a medical history of paroxysmal atrial fibrillation, essential hypertension, recurrent syncopal episodes, who has a loop recorder in place.  He follows with Dr. Taylor and is previously seeing Karla Cary.  He was recently in the hospital with a another syncopal episode that looks to be in the setting of dehydration and hypotension.  He also has reported prior GI bleed.  His blood pressure is lower today but he did take an extra lisinopril last night due to a systolic above 150 mmHg.  Denies any recent issues with chest pain or dyspnea.    Review of Systems   Constitutional: Negative for fever and malaise/fatigue.   HENT:  Negative for nosebleeds and sore throat.    Eyes:  Negative for blurred vision and double vision.   Cardiovascular:  Negative for chest pain, claudication, palpitations and syncope.   Respiratory:  Negative for cough, shortness of breath and snoring.    Endocrine: Negative for cold intolerance, heat intolerance and polydipsia.   Skin:  Negative for itching, poor wound healing and rash.   Musculoskeletal:  Negative for joint pain, joint swelling, muscle weakness and myalgias.   Gastrointestinal:  Negative for abdominal pain, melena, nausea and vomiting.   Neurological:  Negative for light-headedness, loss of balance, seizures, vertigo and weakness.   Psychiatric/Behavioral:  Negative for altered mental status and depression.        Past Medical History:   Diagnosis Date    ADHD (attention deficit hyperactivity disorder)     On going issue    Alcohol abuse     Anxiety     Lepe's esophagus     Benign prostatic hyperplasia Surgery in 12/2021    Brain concussion 11/2023    Clotting  disorder Intestinal    Depression     Diverticulitis     Diverticulosis     Duodenitis     Enteritis     Failure to thrive (child)     Family history of blood clots     Fracture of wrist 1985    Fracture, foot 2019    GERD (gastroesophageal reflux disease)     Hyperlipidemia     Hypertension     Hypertensive emergency     Implantable loop recorder present 03/12/2025    Irritable bowel syndrome 2017    Low testosterone     Microcytosis     Pancreatitis     Peptic ulcer disease 02/23/2025    Pneumonia     PONV (postoperative nausea and vomiting)     Seasonal affective disorder     Shoulder injury     Stroke     Unexplained weight loss     Visual impairment 8/2023       The following portions of the patient's history were reviewed and updated as appropriate: Social history , Family history, and Surgical history     Current Outpatient Medications on File Prior to Visit   Medication Sig Dispense Refill    amLODIPine (NORVASC) 10 MG tablet Take 1 tablet by mouth Daily. 90 tablet 3    aspirin 81 MG chewable tablet Chew 1 tablet Daily.      atorvastatin (LIPITOR) 40 MG tablet Take 1 tablet by mouth Every Night. 90 tablet 3    busPIRone (BUSPAR) 10 MG tablet Take 1 tablet by mouth Every 8 (Eight) Hours. 90 tablet 0    carvedilol (COREG) 12.5 MG tablet Take 1 tablet by mouth 2 (Two) Times a Day With Meals. 180 tablet 3    cloNIDine (CATAPRES) 0.1 MG tablet Take 1 tablet by mouth Daily As Needed (anxiety). 30 tablet 0    folic acid (FOLVITE) 1 MG tablet Take 1 tablet by mouth Daily. 30 tablet 0    lamoTRIgine (LaMICtal) 25 MG tablet Take 1 tab by mouth daily x 2 weeks, if tolerated without discussed side effects increase to 1 tab by mouth twice daily thereafter. 42 tablet 1    lisinopril (PRINIVIL,ZESTRIL) 40 MG tablet Take 0.5 tablets by mouth Daily.      pantoprazole (PROTONIX) 40 MG EC tablet Take 1 tablet by mouth Every Morning Before Breakfast. 30 tablet 0    thiamine (VITAMIN B1) 100 MG tablet Take 1 tablet by mouth  "Daily. 30 tablet 0    vilazodone (Viibryd) 40 MG tablet tablet Take 1 tablet by mouth Daily. 30 tablet 6    vitamin B-12 (CYANOCOBALAMIN) 500 MCG tablet Take 1 tablet by mouth Daily. 90 tablet 0     No current facility-administered medications on file prior to visit.       No Known Allergies    Vitals:    07/07/25 1037   BP: 102/60   Pulse: 60   Weight: 103 kg (228 lb)   Height: 188 cm (74\")     Body mass index is 29.27 kg/m².   Constitutional:       Appearance: Well-developed.   Eyes:      General: No scleral icterus.     Conjunctiva/sclera: Conjunctivae normal.   HENT:      Head: Normocephalic and atraumatic.   Neck:      Thyroid: No thyromegaly.      Vascular: Normal carotid pulses. No carotid bruit, hepatojugular reflux or JVD.      Trachea: No tracheal deviation.   Pulmonary:      Effort: No respiratory distress.      Breath sounds: Normal breath sounds. No decreased breath sounds. No wheezing. No rhonchi. No rales.   Chest:      Chest wall: Not tender to palpatation.   Cardiovascular:      Normal rate. Regular rhythm.      No gallop.    Pulses:     Carotid: 2+ bilaterally.     Radial: 2+ bilaterally.     Femoral: 2+ bilaterally.     Dorsalis pedis: 2+ bilaterally.     Posterior tibial: 2+ bilaterally.  Edema:     Peripheral edema absent.   Abdominal:      General: Bowel sounds are normal. There is no distension.      Palpations: Abdomen is soft.      Tenderness: There is no abdominal tenderness.   Musculoskeletal:         General: No deformity.      Cervical back: Normal range of motion and neck supple. Skin:     Findings: No erythema or rash.   Neurological:      Mental Status: Alert and oriented to person, place, and time.      Sensory: No sensory deficit.   Psychiatric:         Behavior: Behavior normal.         Lab Results   Component Value Date    WBC 8.44 06/26/2025    HGB 14.2 06/26/2025    HCT 42.4 06/26/2025    MCV 85.5 06/26/2025     06/26/2025       Lab Results   Component Value Date    " GLUCOSE 98 06/26/2025    BUN 19.0 06/26/2025    CREATININE 1.01 06/26/2025     06/26/2025    K 4.6 06/26/2025     06/26/2025    CALCIUM 8.8 06/26/2025    PROTEINTOT 7.4 06/23/2025    ALBUMIN 4.3 06/23/2025    ALT 26 06/23/2025    AST 18 06/23/2025    ALKPHOS 72 06/23/2025    BILITOT 0.4 06/23/2025    GLOB 3.1 06/23/2025    AGRATIO 1.4 06/23/2025    BCR 18.8 06/26/2025    ANIONGAP 8.9 06/26/2025    EGFR 86.2 06/26/2025       Lab Results   Component Value Date    GLUCOSE 98 06/26/2025    CALCIUM 8.8 06/26/2025     06/26/2025    K 4.6 06/26/2025    CO2 23.1 06/26/2025     06/26/2025    BUN 19.0 06/26/2025    CREATININE 1.01 06/26/2025    EGFR 86.2 06/26/2025    BCR 18.8 06/26/2025    ANIONGAP 8.9 06/26/2025       Lab Results   Component Value Date    CHOL 163 01/26/2025    CHLPL 215 (H) 05/11/2022    TRIG 154 (H) 01/26/2025    HDL 33 (L) 01/26/2025     (H) 01/26/2025       Procedures     Results for orders placed during the hospital encounter of 01/25/25    Adult Transthoracic Echo Complete W/ Cont if Necessary Per Protocol 01/26/2025  1:20 PM    Interpretation Summary    Left ventricular ejection fraction appears to be 56 - 60%.    The left ventricular cavity is borderline dilated.    Left ventricular diastolic function was normal.    Estimated right ventricular systolic pressure from tricuspid regurgitation is normal (<35 mmHg).      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  He has not been on anticoagulant therapy recently secondary to his recurrent syncopal episode and falls.  I have reviewed his prior neurologic workup along with his prior transesophageal echocardiogram and I do think he is an appropriate candidate to consider left atrial appendage occlusion.   His left atrial appendage anatomy is acceptable.  This was evaluated in 2023 with a transesophageal echocardiogram    1.  Paroxysmal atrial fibrillation  - Not on anticoagulant therapy in the setting of a [id number] score of 3.  - Continue carvedilol at current dose.  - Recommend moving forward with left atrial appendage occlusion.  - Risks and benefits of the procedure have been discussed with the patient and he is aware.    2.  Essential hypertension: I discouraged him from taking additional doses of lisinopril therapy in the evening unless his systolic is above 160 mmHg as his blood pressure is currently low normal and he has had syncopal episodes in the recent past.

## 2025-07-07 NOTE — OUTREACH NOTE
Medical Week 2 Survey      Flowsheet Row Responses   [facility] patient discharged from? [place]   Does the patient have one of the following disease processes/diagnoses(primary or secondary)? Other   Week 2 attempt successful? No   Unsuccessful attempts Attempt 2            NATHANAEL PRITCHETT - Registered Nurse

## 2025-07-07 NOTE — H&P (VIEW-ONLY)
Flako Coreas .  1965  Date of Office Visit: 07/07/25  Encounter Provider: Saúl Be MD  Place of Service: [facility] CARDIOLOGY      CHIEF COMPLAINT:  Paroxysmal atrial fibrillation  Recurrent syncope   Prior CVA      HISTORY OF PRESENT ILLNESS:  60-year-old male with a medical history of paroxysmal atrial fibrillation, essential hypertension, recurrent syncopal episodes, who has a loop recorder in place.  He follows with Dr. Taylor and is previously seeing Karla Cary.  He was recently in the hospital with a another syncopal episode that looks to be in the setting of dehydration and hypotension.  He also has reported prior GI bleed.  His blood pressure is lower today but he did take an extra lisinopril last night due to a systolic above 150 mmHg.  Denies any recent issues with chest pain or dyspnea.    Review of Systems   Constitutional: Negative for fever and malaise/fatigue.   HENT:  Negative for nosebleeds and sore throat.    Eyes:  Negative for blurred vision and double vision.   Cardiovascular:  Negative for chest pain, claudication, palpitations and syncope.   Respiratory:  Negative for cough, shortness of breath and snoring.    Endocrine: Negative for cold intolerance, heat intolerance and polydipsia.   Skin:  Negative for itching, poor wound healing and rash.   Musculoskeletal:  Negative for joint pain, joint swelling, muscle weakness and myalgias.   Gastrointestinal:  Negative for abdominal pain, melena, nausea and vomiting.   Neurological:  Negative for light-headedness, loss of balance, seizures, vertigo and weakness.   Psychiatric/Behavioral:  Negative for altered mental status and depression.        Past Medical History:   Diagnosis Date    ADHD (attention deficit hyperactivity disorder)     On going issue    Alcohol abuse     Anxiety     Lepe's esophagus     Benign prostatic hyperplasia Surgery in 12/2021    Brain concussion 11/2023    Clotting  disorder Intestinal    Depression     Diverticulitis     Diverticulosis     Duodenitis     Enteritis     Failure to thrive (child)     Family history of blood clots     Fracture of wrist 1985    Fracture, foot 2019    GERD (gastroesophageal reflux disease)     Hyperlipidemia     Hypertension     Hypertensive emergency     Implantable loop recorder present 03/12/2025    Irritable bowel syndrome 2017    Low testosterone     Microcytosis     Pancreatitis     Peptic ulcer disease 02/23/2025    Pneumonia     PONV (postoperative nausea and vomiting)     Seasonal affective disorder     Shoulder injury     Stroke     Unexplained weight loss     Visual impairment 8/2023       The following portions of the patient's history were reviewed and updated as appropriate: Social history , Family history, and Surgical history     Current Outpatient Medications on File Prior to Visit   Medication Sig Dispense Refill    amLODIPine (NORVASC) 10 MG tablet Take 1 tablet by mouth Daily. 90 tablet 3    aspirin 81 MG chewable tablet Chew 1 tablet Daily.      atorvastatin (LIPITOR) 40 MG tablet Take 1 tablet by mouth Every Night. 90 tablet 3    busPIRone (BUSPAR) 10 MG tablet Take 1 tablet by mouth Every 8 (Eight) Hours. 90 tablet 0    carvedilol (COREG) 12.5 MG tablet Take 1 tablet by mouth 2 (Two) Times a Day With Meals. 180 tablet 3    cloNIDine (CATAPRES) 0.1 MG tablet Take 1 tablet by mouth Daily As Needed (anxiety). 30 tablet 0    folic acid (FOLVITE) 1 MG tablet Take 1 tablet by mouth Daily. 30 tablet 0    lamoTRIgine (LaMICtal) 25 MG tablet Take 1 tab by mouth daily x 2 weeks, if tolerated without discussed side effects increase to 1 tab by mouth twice daily thereafter. 42 tablet 1    lisinopril (PRINIVIL,ZESTRIL) 40 MG tablet Take 0.5 tablets by mouth Daily.      pantoprazole (PROTONIX) 40 MG EC tablet Take 1 tablet by mouth Every Morning Before Breakfast. 30 tablet 0    thiamine (VITAMIN B1) 100 MG tablet Take 1 tablet by mouth  "Daily. 30 tablet 0    vilazodone (Viibryd) 40 MG tablet tablet Take 1 tablet by mouth Daily. 30 tablet 6    vitamin B-12 (CYANOCOBALAMIN) 500 MCG tablet Take 1 tablet by mouth Daily. 90 tablet 0     No current facility-administered medications on file prior to visit.       No Known Allergies    Vitals:    07/07/25 1037   BP: 102/60   Pulse: 60   Weight: 103 kg (228 lb)   Height: 188 cm (74\")     Body mass index is 29.27 kg/m².   Constitutional:       Appearance: Well-developed.   Eyes:      General: No scleral icterus.     Conjunctiva/sclera: Conjunctivae normal.   HENT:      Head: Normocephalic and atraumatic.   Neck:      Thyroid: No thyromegaly.      Vascular: Normal carotid pulses. No carotid bruit, hepatojugular reflux or JVD.      Trachea: No tracheal deviation.   Pulmonary:      Effort: No respiratory distress.      Breath sounds: Normal breath sounds. No decreased breath sounds. No wheezing. No rhonchi. No rales.   Chest:      Chest wall: Not tender to palpatation.   Cardiovascular:      Normal rate. Regular rhythm.      No gallop.    Pulses:     Carotid: 2+ bilaterally.     Radial: 2+ bilaterally.     Femoral: 2+ bilaterally.     Dorsalis pedis: 2+ bilaterally.     Posterior tibial: 2+ bilaterally.  Edema:     Peripheral edema absent.   Abdominal:      General: Bowel sounds are normal. There is no distension.      Palpations: Abdomen is soft.      Tenderness: There is no abdominal tenderness.   Musculoskeletal:         General: No deformity.      Cervical back: Normal range of motion and neck supple. Skin:     Findings: No erythema or rash.   Neurological:      Mental Status: Alert and oriented to person, place, and time.      Sensory: No sensory deficit.   Psychiatric:         Behavior: Behavior normal.         Lab Results   Component Value Date    WBC 8.44 06/26/2025    HGB 14.2 06/26/2025    HCT 42.4 06/26/2025    MCV 85.5 06/26/2025     06/26/2025       Lab Results   Component Value Date    " GLUCOSE 98 06/26/2025    BUN 19.0 06/26/2025    CREATININE 1.01 06/26/2025     06/26/2025    K 4.6 06/26/2025     06/26/2025    CALCIUM 8.8 06/26/2025    PROTEINTOT 7.4 06/23/2025    ALBUMIN 4.3 06/23/2025    ALT 26 06/23/2025    AST 18 06/23/2025    ALKPHOS 72 06/23/2025    BILITOT 0.4 06/23/2025    GLOB 3.1 06/23/2025    AGRATIO 1.4 06/23/2025    BCR 18.8 06/26/2025    ANIONGAP 8.9 06/26/2025    EGFR 86.2 06/26/2025       Lab Results   Component Value Date    GLUCOSE 98 06/26/2025    CALCIUM 8.8 06/26/2025     06/26/2025    K 4.6 06/26/2025    CO2 23.1 06/26/2025     06/26/2025    BUN 19.0 06/26/2025    CREATININE 1.01 06/26/2025    EGFR 86.2 06/26/2025    BCR 18.8 06/26/2025    ANIONGAP 8.9 06/26/2025       Lab Results   Component Value Date    CHOL 163 01/26/2025    CHLPL 215 (H) 05/11/2022    TRIG 154 (H) 01/26/2025    HDL 33 (L) 01/26/2025     (H) 01/26/2025       Procedures     Results for orders placed during the hospital encounter of 01/25/25    Adult Transthoracic Echo Complete W/ Cont if Necessary Per Protocol 01/26/2025  1:20 PM    Interpretation Summary    Left ventricular ejection fraction appears to be 56 - 60%.    The left ventricular cavity is borderline dilated.    Left ventricular diastolic function was normal.    Estimated right ventricular systolic pressure from tricuspid regurgitation is normal (<35 mmHg).      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  He has not been on anticoagulant therapy recently secondary to his recurrent syncopal episode and falls.  I have reviewed his prior neurologic workup along with his prior transesophageal echocardiogram and I do think he is an appropriate candidate to consider left atrial appendage occlusion.   His left atrial appendage anatomy is acceptable.  This was evaluated in 2023 with a transesophageal echocardiogram    1.  Paroxysmal atrial fibrillation  - Not on anticoagulant therapy in the setting of a [id number] score of 3.  - Continue carvedilol at current dose.  - Recommend moving forward with left atrial appendage occlusion.  - Risks and benefits of the procedure have been discussed with the patient and he is aware.    2.  Essential hypertension: I discouraged him from taking additional doses of lisinopril therapy in the evening unless his systolic is above 160 mmHg as his blood pressure is currently low normal and he has had syncopal episodes in the recent past.

## 2025-07-10 ENCOUNTER — OFFICE VISIT (OUTPATIENT)
Age: 60
End: 2025-07-10
Payer: COMMERCIAL

## 2025-07-10 VITALS
HEART RATE: 62 BPM | BODY MASS INDEX: 29.52 KG/M2 | SYSTOLIC BLOOD PRESSURE: 118 MMHG | HEIGHT: 74 IN | OXYGEN SATURATION: 91 % | WEIGHT: 230 LBS | DIASTOLIC BLOOD PRESSURE: 80 MMHG

## 2025-07-10 DIAGNOSIS — I48.0 PAF (PAROXYSMAL ATRIAL FIBRILLATION): Primary | ICD-10-CM

## 2025-07-10 DIAGNOSIS — R55 RECURRENT SYNCOPE: ICD-10-CM

## 2025-07-10 DIAGNOSIS — Z01.818 PREOPERATIVE TESTING: ICD-10-CM

## 2025-07-10 DIAGNOSIS — R29.6 RECURRENT FALLS: ICD-10-CM

## 2025-07-10 RX ORDER — LISINOPRIL 20 MG/1
20 TABLET ORAL DAILY
Qty: 90 TABLET | Refills: 3 | Status: SHIPPED | OUTPATIENT
Start: 2025-07-10

## 2025-07-10 RX ORDER — CARVEDILOL 6.25 MG/1
6.25 TABLET ORAL 2 TIMES DAILY WITH MEALS
Qty: 60 TABLET | Refills: 11 | Status: SHIPPED | OUTPATIENT
Start: 2025-07-10

## 2025-07-10 NOTE — PROGRESS NOTES
Date of Office Visit: 07/10/2025  Encounter Provider: JANEEN Soto  Place of Service: [facility] CARDIOLOGY  Patient Name: Flako Coreas Sr.  :1965    Chief complaint: Paroxysmal atrial fibrillation and previous stroke    HPI: Flako Coreas Sr. is a 60 y.o. male who is a patient of Dr. Taylor and was recently seen by Dr. Be for evaluation of watchman.  He is new to me today.  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  The stroke involve the cerebellum and since that time he has had numerous syncopal episodes.  He did have a loop recorder placed, there were a few pauses which were thought to be vasovagal and did not correlate with any of his syncopal episodes.  He has had sinus bradycardia with a heart rate in the 50s.  He has a chads Vascor of 3 with recurrent syncopal episodes he has had recurrent syncope and falls previous neurological workup and MEGHANN we considered him for left atrial appendage occlusion device.  He also has a history of hypertension.  He also had a tilt table test earlier this year that was unremarkable.    He was in the hospital in  with syncope and orthostasis.  It was most likely related to dehydration and acute kidney injury.  He is here for follow-up today.  He states his dizziness and near syncope has not improved.  He states his blood pressure has been very low and he is not taking his medications at all today.  He has been holding his Coreg at night.  Today blood pressure is 118/80 without medication.    Previous testing and notes have been reviewed by me.     2023 stress test    Equivocal ECG evidence of myocardial ischemia.  This was a submaximal stress test.  73% of age-predicted heart rate achieved.    Negative clinical evidence of myocardial ischemia.       2025 echocardiogram    Left ventricular ejection fraction appears to be 56 - 60%.    The  left ventricular cavity is borderline dilated.    Left ventricular diastolic function was normal.    Estimated right ventricular systolic pressure from tricuspid regurgitation is normal (<35 mmHg).         Latest Reference Range & Units Most Recent   Sodium 136 - 145 mmol/L 139  6/26/25 05:55   Potassium 3.5 - 5.2 mmol/L 4.6  6/26/25 05:55   Chloride 98 - 107 mmol/L 107  6/26/25 05:55   CO2 22.0 - 29.0 mmol/L 23.1  6/26/25 05:55   CO2 22 - 29 mmol/L 26 (E)  8/27/22 11:09   Anion Gap 5.0 - 15.0 mmol/L 8.9  6/26/25 05:55   BUN 6.0 - 20.0 mg/dL 19.0  6/26/25 05:55   Creatinine 0.76 - 1.27 mg/dL 1.01  6/26/25 05:55   BUN/Creatinine Ratio 7.0 - 25.0  18.8  6/26/25 05:55   eGFR >60.0 mL/min/1.73 86.2  6/26/25 05:55   EGFR Result >59 mL/min/1.73 84  5/11/22 09:35   Est GFR by Clearance >60 /1.73 m2 >60 (E)  8/27/22 11:09   Glucose 65 - 99 mg/dL 98  6/26/25 05:55   Calcium 8.6 - 10.5 mg/dL 8.8  6/26/25 05:55   Magnesium 1.6 - 2.6 mg/dL 2.3  6/25/25 06:17   Phosphorus 2.5 - 4.5 mg/dL 2.4 (L)  6/25/25 06:17   Alkaline Phosphatase 39 - 117 U/L 72  6/23/25 20:29   Total Protein 6.0 - 8.5 g/dL 7.4  6/23/25 20:29   Albumin 3.5 - 5.2 g/dL 4.3  6/23/25 20:29   Globulin gm/dL 3.1  6/23/25 20:29   A/G Ratio g/dL 1.4  6/23/25 20:29   AST (SGOT) 1 - 40 U/L 18  6/23/25 20:29   ALT (SGPT) 1 - 41 U/L 26  6/23/25 20:29   Total Bilirubin 0.0 - 1.2 mg/dL 0.4  6/23/25 20:29   Bilirubin, Direct 0.0 - 0.3 mg/dL <0.2  1/28/25 03:47   Bilirubin, Indirect  See Comment  1/28/25 03:47   eGFR Non African Am >60 mL/min/1.73 98  11/5/18 06:30   eGFR  Am >60 /1.73 m2 >60 (E)  8/27/22 11:09   Uric Acid 3.4 - 7.0 mg/dL 5.0  6/25/25 06:17   (L): Data is abnormally low  (E): External lab result     Latest Reference Range & Units Most Recent   Total Cholesterol 0 - 200 mg/dL 163  1/26/25 06:09   HDL Cholesterol 40 - 60 mg/dL 33 (L)  1/26/25 06:09   LDL Cholesterol  0 - 100 mg/dL 103 (H)  1/26/25 06:09   VLDL Cholesterol 5 - 40 mg/dL 27  1/26/25 06:09    Triglycerides 0 - 150 mg/dL 154 (H)  1/26/25 06:09   Lipoprotein (a) <75.0 nmol/L 209.9 (H)  2/13/24 10:48   Chol/HDL Ratio RATIO 5.5 (E)  7/27/18 05:29   LDL/HDL Ratio  3.01  1/26/25 06:09   VLDL Cholesterol Aníbal 5 - 40 mg/dL 27  5/11/22 09:35   (L): Data is abnormally low  (H): Data is abnormally high  (E): External lab result  Past Medical History:   Diagnosis Date    ADHD (attention deficit hyperactivity disorder)     On going issue    Alcohol abuse     Anxiety     Lepe's esophagus     Benign prostatic hyperplasia Surgery in 12/2021    Brain concussion 11/2023    Clotting disorder Intestinal    Depression     Diverticulitis     Diverticulosis     Duodenitis     Enteritis     Failure to thrive (child)     Family history of blood clots     Fracture of wrist 1985    Fracture, foot 2019    GERD (gastroesophageal reflux disease)     Hyperlipidemia     Hypertension     Hypertensive emergency     Implantable loop recorder present 03/12/2025    Irritable bowel syndrome 2017    Low testosterone     Microcytosis     Pancreatitis     Peptic ulcer disease 02/23/2025    Pneumonia     PONV (postoperative nausea and vomiting)     Seasonal affective disorder     Shoulder injury     Stroke     Unexplained weight loss     Visual impairment 8/2023       Past Surgical History:   Procedure Laterality Date    ABDOMINAL SURGERY      CARDIAC ELECTROPHYSIOLOGY PROCEDURE N/A 01/24/2024    Procedure: Loop insertion- [employer] LINQ;  Surgeon: Kaela Walker MD;  Location: [facility] CATH INVASIVE LOCATION;  Service: Cardiovascular;  Laterality: N/A;    COLON SURGERY      COLONOSCOPY      COLONOSCOPY N/A 12/11/2022    Procedure: COLONOSCOPY INTO CECUM WITH HOT SNARE POLYPECTOMY;  Surgeon: Albert Gallo MD;  Location: [facility] ENDOSCOPY;  Service: Gastroenterology;  Laterality: N/A;  PRE- GI BLEED  POST- DIVERTICULOSIS, POLYP    COLONOSCOPY N/A 02/14/2024    Procedure: COLONOSCOPY into cecum/terminal ileum;  Surgeon: Albert Gallo MD;   Location:  [facility];  Service: Gastroenterology;  Laterality: N/A;  diverticulosis, hemorrhoids    ENDOSCOPY N/A 12/10/2022    Procedure: ESOPHAGOGASTRODUODENOSCOPY;  Surgeon: Albert Gallo MD;  Location: [facility] ENDOSCOPY;  Service: Gastroenterology;  Laterality: N/A;  PRE- DARK STOOLS  POST- BARRETTS ESOPHAGUS    ENDOSCOPY N/A 02/14/2024    Procedure: ESOPHAGOGASTRODUODENOSCOPY with biopsy;  Surgeon: Albert Gallo MD;  Location: [facility] ENDOSCOPY;  Service: Gastroenterology;  Laterality: N/A;  long segment wilson's, hiatal hernia    FRACTURE SURGERY Right 1980    for broken arm    FRACTURE SURGERY Right     for broken hand    GASTRECTOMY      HAND SURGERY  1990    INCISION AND DRAINAGE ABSCESS  2015    anal    JOINT REPLACEMENT  2022    Rebuild shoulder    PROSTATE SURGERY      SHOULDER SURGERY  2022    SKIN BIOPSY      SMALL INTESTINE SURGERY      TONSILLECTOMY      TRIGGER POINT INJECTION  2017       Social History     Socioeconomic History    Marital status:    Tobacco Use    Smoking status: Never     Passive exposure: Never    Smokeless tobacco: Never   Vaping Use    Vaping status: Never Used   Substance and Sexual Activity    Alcohol use: Not Currently     Comment: Hx Abuse    Drug use: Not Currently     Frequency: 1.0 times per week     Types: Marijuana    Sexual activity: Defer     Partners: Female     Birth control/protection: Other, Birth control pill       Family History   Problem Relation Age of Onset    Stroke Mother     Heart disease Mother     Stroke Father     Hyperlipidemia Father     Hypertension Father        Review of Systems   Constitutional: Negative for diaphoresis and malaise/fatigue.   Cardiovascular:  Positive for near-syncope and syncope. Negative for chest pain, claudication, dyspnea on exertion, irregular heartbeat, leg swelling, orthopnea, palpitations and paroxysmal nocturnal dyspnea.   Respiratory:  Negative for cough, shortness of breath and sleep disturbances  "due to breathing.    Musculoskeletal:  Negative for falls.   Neurological:  Positive for dizziness. Negative for weakness.   Psychiatric/Behavioral:  Negative for altered mental status and substance abuse.        No Known Allergies      Current Outpatient Medications:     aspirin 81 MG chewable tablet, Chew 1 tablet Daily., Disp: , Rfl:     atorvastatin (LIPITOR) 40 MG tablet, Take 1 tablet by mouth Every Night., Disp: 90 tablet, Rfl: 3    busPIRone (BUSPAR) 10 MG tablet, Take 1 tablet by mouth Every 8 (Eight) Hours., Disp: 90 tablet, Rfl: 0    carvedilol (COREG) 6.25 MG tablet, Take 1 tablet by mouth 2 (Two) Times a Day With Meals., Disp: 60 tablet, Rfl: 11    cloNIDine (CATAPRES) 0.1 MG tablet, Take 1 tablet by mouth Daily As Needed (anxiety)., Disp: 30 tablet, Rfl: 0    folic acid (FOLVITE) 1 MG tablet, Take 1 tablet by mouth Daily., Disp: 30 tablet, Rfl: 0    lamoTRIgine (LaMICtal) 25 MG tablet, Take 1 tab by mouth daily x 2 weeks, if tolerated without discussed side effects increase to 1 tab by mouth twice daily thereafter., Disp: 42 tablet, Rfl: 1    lisinopril (PRINIVIL,ZESTRIL) 20 MG tablet, Take 1 tablet by mouth Daily., Disp: 90 tablet, Rfl: 3    pantoprazole (PROTONIX) 40 MG EC tablet, Take 1 tablet by mouth Every Morning Before Breakfast., Disp: 30 tablet, Rfl: 0    thiamine (VITAMIN B1) 100 MG tablet, Take 1 tablet by mouth Daily., Disp: 30 tablet, Rfl: 0    vilazodone (Viibryd) 40 MG tablet tablet, Take 1 tablet by mouth Daily., Disp: 30 tablet, Rfl: 6    vitamin B-12 (CYANOCOBALAMIN) 500 MCG tablet, Take 1 tablet by mouth Daily., Disp: 90 tablet, Rfl: 0        Objective:     Vitals:    07/10/25 0949   BP: 118/80   Pulse: 62   SpO2: 91%   Weight: 104 kg (230 lb)   Height: 188 cm (74\")     Body mass index is 29.53 kg/m².    PHYSICAL EXAM:    Constitutional:       General: Not in acute distress.     Appearance: Normal appearance. Well-developed.   Eyes:      Pupils: Pupils are equal, round, and reactive " to light.   HENT:      Head: Normocephalic.   Neck:      Vascular: No carotid bruit or JVD.   Pulmonary:      Effort: Pulmonary effort is normal. No tachypnea.      Breath sounds: Normal breath sounds. No wheezing. No rales.   Cardiovascular:      Normal rate. Regular rhythm.      No gallop.    Pulses:     Intact distal pulses.   Edema:     Peripheral edema absent.   Abdominal:      General: Bowel sounds are normal.      Palpations: Abdomen is soft.      Tenderness: There is no abdominal tenderness.   Musculoskeletal: Normal range of motion.      Cervical back: Normal range of motion and neck supple. No edema. Skin:     General: Skin is warm and dry.   Neurological:      Mental Status: Alert and oriented to person, place, and time.           ECG 12 Lead    Date/Time: 7/10/2025 10:40 AM  Performed by: Alisa Carl APRN    Authorized by: Alisa Carl APRN  Comparison: compared with previous ECG from 6/25/2025  Similar to previous ECG  Rhythm: sinus rhythm  Rate: normal  Q waves: III and aVF    QRS axis: normal    Clinical impression: non-specific ECG        Lipid Panel          1/26/2025    06:09   Lipid Panel   Total Cholesterol 163    Triglycerides 154    HDL Cholesterol 33    VLDL Cholesterol 27    LDL Cholesterol  103    LDL/HDL Ratio 3.01          Assessment/Plan:      1.  Syncope-remains hypotensive and orthostatic.  I am going to stop amlodipine, cut carvedilol back to 6.25 twice a day.  Continue with lisinopril 20 mg daily.  Discussed take drinking electrolyte drinks for hydration.  Also compression stockings    2.  Paroxysmal A-fib-plans for watchman due to recurrent falls and unable to be anticoagulated.  Sinus rhythm on EKG today    3.  Dyslipidemia LDL mildly elevated continue atorvastatin 40 mg daily    4.  Essential hypertension due to recent hypotension will reduce carvedilol to 6.25 twice a day and stop amlodipine    Follow-up in 1 month with          Your medication list             Accurate as of July 10, 2025 10:41 AM. If you have any questions, ask your nurse or doctor.                CHANGE how you take these medications        Instructions Last Dose Given Next Dose Due   carvedilol 6.25 MG tablet  Commonly known as: COREG  What changed:   medication strength  how much to take  Changed by: Alisa Carl      Take 1 tablet by mouth 2 (Two) Times a Day With Meals.       lisinopril 20 MG tablet  Commonly known as: PRINIVIL,ZESTRIL  What changed: medication strength  Changed by: Alisa Carl      Take 1 tablet by mouth Daily.              CONTINUE taking these medications        Instructions Last Dose Given Next Dose Due   aspirin 81 MG chewable tablet      Chew 1 tablet Daily.       atorvastatin 40 MG tablet  Commonly known as: LIPITOR      Take 1 tablet by mouth Every Night.       busPIRone 10 MG tablet  Commonly known as: BUSPAR      Take 1 tablet by mouth Every 8 (Eight) Hours.       cloNIDine 0.1 MG tablet  Commonly known as: CATAPRES      Take 1 tablet by mouth Daily As Needed (anxiety).       folic acid 1 MG tablet  Commonly known as: FOLVITE      Take 1 tablet by mouth Daily.       lamoTRIgine 25 MG tablet  Commonly known as: LaMICtal      Take 1 tab by mouth daily x 2 weeks, if tolerated without discussed side effects increase to 1 tab by mouth twice daily thereafter.       pantoprazole 40 MG EC tablet  Commonly known as: PROTONIX      Take 1 tablet by mouth Every Morning Before Breakfast.       thiamine 100 MG tablet  Commonly known as: VITAMIN B1      Take 1 tablet by mouth Daily.       vilazodone 40 MG tablet tablet  Commonly known as: Viibryd      Take 1 tablet by mouth Daily.       vitamin B-12 500 MCG tablet  Commonly known as: CYANOCOBALAMIN      Take 1 tablet by mouth Daily.              STOP taking these medications      amLODIPine 10 MG tablet  Commonly known as: NORVASC  Stopped by: Alisa Carl                  Where to Get Your Medications        These medications were sent to  [facility] [address] [place] [address] ([place]), [place] - [address] [facility] - [phone]  - [phone] [address] ([place]) [place] [address]      Phone: [phone]   carvedilol 6.25 MG tablet  lisinopril 20 MG tablet           As always, it has been a pleasure to participate in your patient's care.      Sincerely,     Alisa VERNON

## 2025-07-11 ENCOUNTER — TELEPHONE (OUTPATIENT)
Dept: CARDIOLOGY | Facility: HOSPITAL | Age: 60
End: 2025-07-11
Payer: COMMERCIAL

## 2025-07-11 LAB
MC_CV_MDC_IDC_RATE_1: 176
MC_CV_MDC_IDC_RATE_1: 3
MC_CV_MDC_IDC_RATE_1: 30
MC_CV_MDC_IDC_ZONE_ID: 1
MC_CV_MDC_IDC_ZONE_ID: 2
MC_CV_MDC_IDC_ZONE_ID: 3
MC_CV_MDC_IDC_ZONE_ID: 4
MC_CV_MDC_IDC_ZONE_ID: 7
MDC_IDC_MSMT_BATTERY_STATUS: NORMAL
MDC_IDC_PG_IMPLANT_DTM: NORMAL
MDC_IDC_PG_IMPLANT_DTM: NORMAL
MDC_IDC_PG_MFG: NORMAL
MDC_IDC_PG_MFG: NORMAL
MDC_IDC_PG_MODEL: NORMAL
MDC_IDC_PG_MODEL: NORMAL
MDC_IDC_PG_SERIAL: NORMAL
MDC_IDC_PG_SERIAL: NORMAL
MDC_IDC_PG_TYPE: NORMAL
MDC_IDC_PG_TYPE: NORMAL
MDC_IDC_SESS_DTM: NORMAL
MDC_IDC_SESS_DTM: NORMAL
MDC_IDC_SESS_TYPE: NORMAL
MDC_IDC_SESS_TYPE: NORMAL
MDC_IDC_SET_ZONE_DETECTION_DETAILS: 16
MDC_IDC_SET_ZONE_DETECTION_DETAILS: 4
MDC_IDC_SET_ZONE_STATUS: NORMAL
MDC_IDC_SET_ZONE_TYPE: NORMAL

## 2025-07-11 NOTE — TELEPHONE ENCOUNTER
Procedure Type:  LAAO/Watchman  Date/Time Procedure:  7/23/25 @ 10:00  Arrival Time:  7/23/25 @ 08:30    Pre-Procedure Testing:  CBC, BMP, PT/INR  Pre-Procedure Instructions: NPO after midnight.     I spoke with Flako and we reviewed the above instructions. He voiced understanding & I've also emailed instructions. He has my contact # and I encouraged him to call with any questions. RTRN    Preadmission Testing (labs)  Please have labs drawn between now & July 21st. You may have these done at [facility] (Entrance A). Orders are available in your electronic medical record.    Tuesday, July 22, 2025  Nothing to eat or drink after Midnight.    Wednesday, July 23, 2025 - Watchman Procedure  Please take all morning meds as scheduled, with a sip of water.    Arrive at Main Surgery Desk at 8:30 AM  You may enter on the 2nd floor (Entrance C) of the Parking Garage.

## 2025-07-15 ENCOUNTER — READMISSION MANAGEMENT (OUTPATIENT)
Dept: CALL CENTER | Facility: HOSPITAL | Age: 60
End: 2025-07-15
Payer: COMMERCIAL

## 2025-07-15 ENCOUNTER — LAB (OUTPATIENT)
Dept: LAB | Facility: HOSPITAL | Age: 60
End: 2025-07-15
Payer: COMMERCIAL

## 2025-07-15 DIAGNOSIS — Z01.818 PREOPERATIVE TESTING: ICD-10-CM

## 2025-07-15 DIAGNOSIS — R55 RECURRENT SYNCOPE: ICD-10-CM

## 2025-07-15 DIAGNOSIS — R29.6 RECURRENT FALLS: ICD-10-CM

## 2025-07-15 DIAGNOSIS — I48.0 PAF (PAROXYSMAL ATRIAL FIBRILLATION): ICD-10-CM

## 2025-07-15 LAB
ANION GAP SERPL CALCULATED.3IONS-SCNC: 10 MMOL/L (ref 5–15)
BUN SERPL-MCNC: 18 MG/DL (ref 6–20)
BUN/CREAT SERPL: 18.4 (ref 7–25)
CALCIUM SPEC-SCNC: 9.3 MG/DL (ref 8.6–10.5)
CHLORIDE SERPL-SCNC: 109 MMOL/L (ref 98–107)
CHOLEST SERPL-MCNC: 196 MG/DL (ref 0–200)
CO2 SERPL-SCNC: 25 MMOL/L (ref 22–29)
CREAT SERPL-MCNC: 0.98 MG/DL (ref 0.76–1.27)
DEPRECATED RDW RBC AUTO: 42.7 FL (ref 37–54)
EGFRCR SERPLBLD CKD-EPI 2021: 89.4 ML/MIN/1.73
ERYTHROCYTE [DISTWIDTH] IN BLOOD BY AUTOMATED COUNT: 13.5 % (ref 12.3–15.4)
GLUCOSE SERPL-MCNC: 98 MG/DL (ref 65–99)
HBA1C MFR BLD: 5.4 % (ref 4.8–5.6)
HCT VFR BLD AUTO: 42.8 % (ref 37.5–51)
HDLC SERPL-MCNC: 32 MG/DL (ref 40–60)
HGB BLD-MCNC: 14 G/DL (ref 13–17.7)
INR PPP: 0.95 (ref 0.9–1.1)
LDLC SERPL CALC-MCNC: 128 MG/DL (ref 0–100)
LDLC/HDLC SERPL: 3.86 {RATIO}
MCH RBC QN AUTO: 28.7 PG (ref 26.6–33)
MCHC RBC AUTO-ENTMCNC: 32.7 G/DL (ref 31.5–35.7)
MCV RBC AUTO: 87.7 FL (ref 79–97)
PLATELET # BLD AUTO: 220 10*3/MM3 (ref 140–450)
PMV BLD AUTO: 9.6 FL (ref 6–12)
POTASSIUM SERPL-SCNC: 5 MMOL/L (ref 3.5–5.2)
PROTHROMBIN TIME: 12.5 SECONDS (ref 11.7–14.2)
PSA SERPL-MCNC: 0.36 NG/ML (ref 0–4)
RBC # BLD AUTO: 4.88 10*6/MM3 (ref 4.14–5.8)
SODIUM SERPL-SCNC: 144 MMOL/L (ref 136–145)
TRIGL SERPL-MCNC: 203 MG/DL (ref 0–150)
VLDLC SERPL-MCNC: 36 MG/DL (ref 5–40)
WBC NRBC COR # BLD AUTO: 6.28 10*3/MM3 (ref 3.4–10.8)

## 2025-07-15 PROCEDURE — G0103 PSA SCREENING: HCPCS | Performed by: FAMILY MEDICINE

## 2025-07-15 PROCEDURE — 80061 LIPID PANEL: CPT | Performed by: FAMILY MEDICINE

## 2025-07-15 PROCEDURE — 85027 COMPLETE CBC AUTOMATED: CPT

## 2025-07-15 PROCEDURE — 83036 HEMOGLOBIN GLYCOSYLATED A1C: CPT | Performed by: FAMILY MEDICINE

## 2025-07-15 PROCEDURE — 80048 BASIC METABOLIC PNL TOTAL CA: CPT

## 2025-07-15 PROCEDURE — 85610 PROTHROMBIN TIME: CPT

## 2025-07-15 NOTE — OUTREACH NOTE
Medical Week 3 Survey      Flowsheet Row Responses   [facility] patient discharged from? [facility]   Does the patient have one of the following disease processes/diagnoses(primary or secondary)? Other   Week 3 attempt successful? No   Unsuccessful attempts Attempt 1  [All numbers listed were attempted,  no answer]            Mari H - Registered Nurse

## 2025-07-16 ENCOUNTER — TELEPHONE (OUTPATIENT)
Dept: CARDIOLOGY | Facility: HOSPITAL | Age: 60
End: 2025-07-16
Payer: COMMERCIAL

## 2025-07-16 NOTE — TELEPHONE ENCOUNTER
Pt called stating he's been diagnosed with Shingles as of today. He notes a rash on his shoulder.  I reviewed with Dr Be, can still proceed with Watchman procedure next week. I made Flako aware. RTRN

## 2025-07-22 ENCOUNTER — TELEPHONE (OUTPATIENT)
Dept: CARDIOLOGY | Facility: HOSPITAL | Age: 60
End: 2025-07-22
Payer: COMMERCIAL

## 2025-07-22 ENCOUNTER — READMISSION MANAGEMENT (OUTPATIENT)
Dept: CALL CENTER | Facility: HOSPITAL | Age: 60
End: 2025-07-22
Payer: COMMERCIAL

## 2025-07-22 NOTE — OUTREACH NOTE
Medical Week 3 Survey      Flowsheet Row Responses   [facility] patient discharged from? [facility]   Does the patient have one of the following disease processes/diagnoses(primary or secondary)? Other   Week 3 attempt successful? No   Unsuccessful attempts Attempt 2   Revoke Linette Monique Registered Nurse

## 2025-07-22 NOTE — TELEPHONE ENCOUNTER
Procedure Type:  LAAO/Watchman  Date/Time Procedure:  7/23/25 @ 10:00  Arrival Time:  7/23/25 @ 08:30     Pre-Procedure Testing:  Labs 7/15/25  Pre-Procedure Instructions: NPO after midnight.    I spoke with Flako to check in for any questions. He understands instructions and does not currently have any questions. RTRN

## 2025-07-23 ENCOUNTER — ANESTHESIA (OUTPATIENT)
Dept: CARDIOLOGY | Facility: HOSPITAL | Age: 60
End: 2025-07-23
Payer: COMMERCIAL

## 2025-07-23 ENCOUNTER — ANESTHESIA EVENT (OUTPATIENT)
Dept: CARDIOLOGY | Facility: HOSPITAL | Age: 60
End: 2025-07-23
Payer: COMMERCIAL

## 2025-07-23 ENCOUNTER — ANCILLARY PROCEDURE (OUTPATIENT)
Dept: PERIOP | Facility: HOSPITAL | Age: 60
End: 2025-07-23
Payer: COMMERCIAL

## 2025-07-23 ENCOUNTER — HOSPITAL ENCOUNTER (INPATIENT)
Facility: HOSPITAL | Age: 60
LOS: 1 days | Discharge: HOME | End: 2025-07-24
Attending: INTERNAL MEDICINE | Admitting: INTERNAL MEDICINE
Payer: COMMERCIAL

## 2025-07-23 DIAGNOSIS — Z95.818 PRESENCE OF WATCHMAN LEFT ATRIAL APPENDAGE CLOSURE DEVICE: ICD-10-CM

## 2025-07-23 DIAGNOSIS — R29.6 RECURRENT FALLS: ICD-10-CM

## 2025-07-23 DIAGNOSIS — R55 RECURRENT SYNCOPE: ICD-10-CM

## 2025-07-23 DIAGNOSIS — I48.0 PAF (PAROXYSMAL ATRIAL FIBRILLATION): ICD-10-CM

## 2025-07-23 DIAGNOSIS — R21 RASH: ICD-10-CM

## 2025-07-23 DIAGNOSIS — I48.0 PAF (PAROXYSMAL ATRIAL FIBRILLATION): Primary | ICD-10-CM

## 2025-07-23 PROBLEM — I48.91 ATRIAL FIBRILLATION: Status: ACTIVE | Noted: 2025-07-23

## 2025-07-23 LAB — ACT BLD: 210 SECONDS (ref 82–152)

## 2025-07-23 PROCEDURE — 02L73DK OCCLUSION OF LEFT ATRIAL APPENDAGE WITH INTRALUMINAL DEVICE, PERCUTANEOUS APPROACH: ICD-10-PCS | Performed by: INTERNAL MEDICINE

## 2025-07-23 PROCEDURE — 25010000002 ONDANSETRON PER 1 MG: Performed by: ANESTHESIOLOGY

## 2025-07-23 PROCEDURE — 25510000001 IOPAMIDOL PER 1 ML: Performed by: INTERNAL MEDICINE

## 2025-07-23 PROCEDURE — 25010000002 PROTAMINE SULFATE PER 10 MG: Performed by: ANESTHESIOLOGY

## 2025-07-23 PROCEDURE — C1769 GUIDE WIRE: HCPCS | Performed by: INTERNAL MEDICINE

## 2025-07-23 PROCEDURE — 25010000002 LIDOCAINE 2% SOLUTION: Performed by: INTERNAL MEDICINE

## 2025-07-23 PROCEDURE — C1889 IMPLANT/INSERT DEVICE, NOC: HCPCS | Performed by: INTERNAL MEDICINE

## 2025-07-23 PROCEDURE — C1894 INTRO/SHEATH, NON-LASER: HCPCS | Performed by: INTERNAL MEDICINE

## 2025-07-23 PROCEDURE — 25010000002 FAMOTIDINE 10 MG/ML SOLUTION: Performed by: STUDENT IN AN ORGANIZED HEALTH CARE EDUCATION/TRAINING PROGRAM

## 2025-07-23 PROCEDURE — 25010000002 MIDAZOLAM PER 1 MG: Performed by: STUDENT IN AN ORGANIZED HEALTH CARE EDUCATION/TRAINING PROGRAM

## 2025-07-23 PROCEDURE — 25010000002 DEXAMETHASONE SODIUM PHOSPHATE 20 MG/5ML SOLUTION: Performed by: ANESTHESIOLOGY

## 2025-07-23 PROCEDURE — 25010000002 CEFAZOLIN PER 500 MG: Performed by: ANESTHESIOLOGY

## 2025-07-23 PROCEDURE — 25010000002 PROPOFOL 10 MG/ML EMULSION: Performed by: ANESTHESIOLOGY

## 2025-07-23 PROCEDURE — 93355 ECHO TRANSESOPHAGEAL (TEE): CPT | Performed by: ANESTHESIOLOGY

## 2025-07-23 PROCEDURE — 93318 ECHO TRANSESOPHAGEAL INTRAOP: CPT

## 2025-07-23 PROCEDURE — 33340 PERQ CLSR TCAT L ATR APNDGE: CPT | Performed by: INTERNAL MEDICINE

## 2025-07-23 PROCEDURE — 25010000002 LIDOCAINE-EPINEPHRINE (PF) 2 %-1:200000 SOLUTION: Performed by: INTERNAL MEDICINE

## 2025-07-23 PROCEDURE — 25810000003 SODIUM CHLORIDE 0.9 % SOLUTION: Performed by: INTERNAL MEDICINE

## 2025-07-23 PROCEDURE — 25010000002 SUGAMMADEX 200 MG/2ML SOLUTION: Performed by: ANESTHESIOLOGY

## 2025-07-23 PROCEDURE — 25010000002 LIDOCAINE 2% SOLUTION: Performed by: ANESTHESIOLOGY

## 2025-07-23 PROCEDURE — 85347 COAGULATION TIME ACTIVATED: CPT

## 2025-07-23 PROCEDURE — 25010000002 HEPARIN (PORCINE) PER 1000 UNITS: Performed by: ANESTHESIOLOGY

## 2025-07-23 PROCEDURE — C1760 CLOSURE DEV, VASC: HCPCS | Performed by: INTERNAL MEDICINE

## 2025-07-23 DEVICE — CP WATCHMAN FLX PRO PROC: Type: IMPLANTABLE DEVICE | Status: FUNCTIONAL

## 2025-07-23 DEVICE — CP SYS WATCHMAN FXD CURVE PROC: Type: IMPLANTABLE DEVICE | Status: FUNCTIONAL

## 2025-07-23 DEVICE — IMPLANTABLE DEVICE: Type: IMPLANTABLE DEVICE | Site: HEART | Status: FUNCTIONAL

## 2025-07-23 RX ORDER — LIDOCAINE HYDROCHLORIDE 20 MG/ML
INJECTION, SOLUTION INFILTRATION; PERINEURAL AS NEEDED
Status: DISCONTINUED | OUTPATIENT
Start: 2025-07-23 | End: 2025-07-23 | Stop reason: SURG

## 2025-07-23 RX ORDER — SODIUM CHLORIDE 0.9 % (FLUSH) 0.9 %
3 SYRINGE (ML) INJECTION EVERY 12 HOURS SCHEDULED
Status: DISCONTINUED | OUTPATIENT
Start: 2025-07-23 | End: 2025-07-23

## 2025-07-23 RX ORDER — LIDOCAINE HYDROCHLORIDE AND EPINEPHRINE 20; 5 MG/ML; UG/ML
INJECTION, SOLUTION EPIDURAL; INFILTRATION; INTRACAUDAL; PERINEURAL
Status: DISCONTINUED | OUTPATIENT
Start: 2025-07-23 | End: 2025-07-23 | Stop reason: HOSPADM

## 2025-07-23 RX ORDER — ONDANSETRON 2 MG/ML
4 INJECTION INTRAMUSCULAR; INTRAVENOUS ONCE AS NEEDED
Status: DISCONTINUED | OUTPATIENT
Start: 2025-07-23 | End: 2025-07-23

## 2025-07-23 RX ORDER — ONDANSETRON 4 MG/1
4 TABLET, ORALLY DISINTEGRATING ORAL EVERY 6 HOURS PRN
Status: DISCONTINUED | OUTPATIENT
Start: 2025-07-23 | End: 2025-07-24 | Stop reason: HOSPADM

## 2025-07-23 RX ORDER — IPRATROPIUM BROMIDE AND ALBUTEROL SULFATE 2.5; .5 MG/3ML; MG/3ML
3 SOLUTION RESPIRATORY (INHALATION) ONCE AS NEEDED
Status: DISCONTINUED | OUTPATIENT
Start: 2025-07-23 | End: 2025-07-23

## 2025-07-23 RX ORDER — ATORVASTATIN CALCIUM 20 MG/1
20 TABLET, FILM COATED ORAL NIGHTLY
Status: DISCONTINUED | OUTPATIENT
Start: 2025-07-24 | End: 2025-07-24 | Stop reason: HOSPADM

## 2025-07-23 RX ORDER — HYDRALAZINE HYDROCHLORIDE 20 MG/ML
5 INJECTION INTRAMUSCULAR; INTRAVENOUS
Status: DISCONTINUED | OUTPATIENT
Start: 2025-07-23 | End: 2025-07-23

## 2025-07-23 RX ORDER — LABETALOL HYDROCHLORIDE 5 MG/ML
5 INJECTION, SOLUTION INTRAVENOUS
Status: DISCONTINUED | OUTPATIENT
Start: 2025-07-23 | End: 2025-07-23

## 2025-07-23 RX ORDER — SODIUM CHLORIDE 9 MG/ML
50 INJECTION, SOLUTION INTRAVENOUS CONTINUOUS
Status: ACTIVE | OUTPATIENT
Start: 2025-07-23 | End: 2025-07-23

## 2025-07-23 RX ORDER — ATROPINE SULFATE 0.4 MG/ML
0.4 INJECTION, SOLUTION INTRAMUSCULAR; INTRAVENOUS; SUBCUTANEOUS ONCE AS NEEDED
Status: DISCONTINUED | OUTPATIENT
Start: 2025-07-23 | End: 2025-07-23

## 2025-07-23 RX ORDER — NITROGLYCERIN 0.4 MG/1
0.4 TABLET SUBLINGUAL
Status: DISCONTINUED | OUTPATIENT
Start: 2025-07-23 | End: 2025-07-24 | Stop reason: HOSPADM

## 2025-07-23 RX ORDER — ASPIRIN 81 MG/1
81 TABLET, CHEWABLE ORAL DAILY
Status: DISCONTINUED | OUTPATIENT
Start: 2025-07-23 | End: 2025-07-24 | Stop reason: HOSPADM

## 2025-07-23 RX ORDER — EPHEDRINE SULFATE 50 MG/ML
5 INJECTION, SOLUTION INTRAVENOUS ONCE AS NEEDED
Status: DISCONTINUED | OUTPATIENT
Start: 2025-07-23 | End: 2025-07-23

## 2025-07-23 RX ORDER — ROCURONIUM BROMIDE 10 MG/ML
INJECTION, SOLUTION INTRAVENOUS AS NEEDED
Status: DISCONTINUED | OUTPATIENT
Start: 2025-07-23 | End: 2025-07-23 | Stop reason: SURG

## 2025-07-23 RX ORDER — LISINOPRIL 10 MG/1
20 TABLET ORAL DAILY
Status: DISCONTINUED | OUTPATIENT
Start: 2025-07-24 | End: 2025-07-24 | Stop reason: HOSPADM

## 2025-07-23 RX ORDER — BUSPIRONE HYDROCHLORIDE 5 MG/1
10 TABLET ORAL EVERY 8 HOURS SCHEDULED
Status: DISCONTINUED | OUTPATIENT
Start: 2025-07-23 | End: 2025-07-24 | Stop reason: HOSPADM

## 2025-07-23 RX ORDER — SODIUM CHLORIDE, SODIUM LACTATE, POTASSIUM CHLORIDE, CALCIUM CHLORIDE 600; 310; 30; 20 MG/100ML; MG/100ML; MG/100ML; MG/100ML
9 INJECTION, SOLUTION INTRAVENOUS CONTINUOUS
Status: ACTIVE | OUTPATIENT
Start: 2025-07-23 | End: 2025-07-24

## 2025-07-23 RX ORDER — PROMETHAZINE HYDROCHLORIDE 25 MG/1
25 TABLET ORAL ONCE AS NEEDED
Status: DISCONTINUED | OUTPATIENT
Start: 2025-07-23 | End: 2025-07-23

## 2025-07-23 RX ORDER — LIDOCAINE HYDROCHLORIDE 10 MG/ML
0.5 INJECTION, SOLUTION INFILTRATION; PERINEURAL ONCE AS NEEDED
Status: DISCONTINUED | OUTPATIENT
Start: 2025-07-23 | End: 2025-07-23

## 2025-07-23 RX ORDER — HYDROMORPHONE HYDROCHLORIDE 1 MG/ML
0.5 INJECTION, SOLUTION INTRAMUSCULAR; INTRAVENOUS; SUBCUTANEOUS
Status: DISCONTINUED | OUTPATIENT
Start: 2025-07-23 | End: 2025-07-23

## 2025-07-23 RX ORDER — FAMOTIDINE 10 MG/ML
20 INJECTION, SOLUTION INTRAVENOUS ONCE
Status: COMPLETED | OUTPATIENT
Start: 2025-07-23 | End: 2025-07-23

## 2025-07-23 RX ORDER — DIPHENHYDRAMINE HYDROCHLORIDE 50 MG/ML
12.5 INJECTION, SOLUTION INTRAMUSCULAR; INTRAVENOUS
Status: DISCONTINUED | OUTPATIENT
Start: 2025-07-23 | End: 2025-07-23

## 2025-07-23 RX ORDER — FLUMAZENIL 0.1 MG/ML
0.2 INJECTION INTRAVENOUS AS NEEDED
Status: DISCONTINUED | OUTPATIENT
Start: 2025-07-23 | End: 2025-07-23

## 2025-07-23 RX ORDER — EPHEDRINE SULFATE 50 MG/ML
INJECTION, SOLUTION INTRAVENOUS AS NEEDED
Status: DISCONTINUED | OUTPATIENT
Start: 2025-07-23 | End: 2025-07-23 | Stop reason: SURG

## 2025-07-23 RX ORDER — OXYCODONE AND ACETAMINOPHEN 7.5; 325 MG/1; MG/1
1 TABLET ORAL EVERY 4 HOURS PRN
Status: DISCONTINUED | OUTPATIENT
Start: 2025-07-23 | End: 2025-07-23

## 2025-07-23 RX ORDER — HEPARIN SODIUM 1000 [USP'U]/ML
INJECTION, SOLUTION INTRAVENOUS; SUBCUTANEOUS AS NEEDED
Status: DISCONTINUED | OUTPATIENT
Start: 2025-07-23 | End: 2025-07-23 | Stop reason: SURG

## 2025-07-23 RX ORDER — FOLIC ACID 1 MG/1
1 TABLET ORAL DAILY
Status: DISCONTINUED | OUTPATIENT
Start: 2025-07-23 | End: 2025-07-24 | Stop reason: HOSPADM

## 2025-07-23 RX ORDER — ONDANSETRON 2 MG/ML
INJECTION INTRAMUSCULAR; INTRAVENOUS AS NEEDED
Status: DISCONTINUED | OUTPATIENT
Start: 2025-07-23 | End: 2025-07-23 | Stop reason: SURG

## 2025-07-23 RX ORDER — MULTIVITAMIN WITH IRON
500 TABLET ORAL DAILY
Status: DISCONTINUED | OUTPATIENT
Start: 2025-07-23 | End: 2025-07-24 | Stop reason: HOSPADM

## 2025-07-23 RX ORDER — DEXAMETHASONE SODIUM PHOSPHATE 4 MG/ML
INJECTION, SOLUTION INTRA-ARTICULAR; INTRALESIONAL; INTRAMUSCULAR; INTRAVENOUS; SOFT TISSUE AS NEEDED
Status: DISCONTINUED | OUTPATIENT
Start: 2025-07-23 | End: 2025-07-23 | Stop reason: SURG

## 2025-07-23 RX ORDER — FENTANYL CITRATE 50 UG/ML
50 INJECTION, SOLUTION INTRAMUSCULAR; INTRAVENOUS ONCE AS NEEDED
Status: DISCONTINUED | OUTPATIENT
Start: 2025-07-23 | End: 2025-07-23

## 2025-07-23 RX ORDER — SODIUM CHLORIDE 0.9 % (FLUSH) 0.9 %
3-10 SYRINGE (ML) INJECTION AS NEEDED
Status: DISCONTINUED | OUTPATIENT
Start: 2025-07-23 | End: 2025-07-23

## 2025-07-23 RX ORDER — LAMOTRIGINE 100 MG/1
25 TABLET ORAL DAILY
Status: DISCONTINUED | OUTPATIENT
Start: 2025-07-23 | End: 2025-07-24 | Stop reason: HOSPADM

## 2025-07-23 RX ORDER — PROPOFOL 10 MG/ML
VIAL (ML) INTRAVENOUS AS NEEDED
Status: DISCONTINUED | OUTPATIENT
Start: 2025-07-23 | End: 2025-07-23 | Stop reason: SURG

## 2025-07-23 RX ORDER — NALOXONE HCL 0.4 MG/ML
0.2 VIAL (ML) INJECTION AS NEEDED
Status: DISCONTINUED | OUTPATIENT
Start: 2025-07-23 | End: 2025-07-23

## 2025-07-23 RX ORDER — VILAZODONE HYDROCHLORIDE 40 MG/1
40 TABLET ORAL DAILY
Status: DISCONTINUED | OUTPATIENT
Start: 2025-07-24 | End: 2025-07-24 | Stop reason: HOSPADM

## 2025-07-23 RX ORDER — FENTANYL CITRATE 50 UG/ML
50 INJECTION, SOLUTION INTRAMUSCULAR; INTRAVENOUS
Status: DISCONTINUED | OUTPATIENT
Start: 2025-07-23 | End: 2025-07-23

## 2025-07-23 RX ORDER — MIDAZOLAM HYDROCHLORIDE 1 MG/ML
1 INJECTION, SOLUTION INTRAMUSCULAR; INTRAVENOUS
Status: DISCONTINUED | OUTPATIENT
Start: 2025-07-23 | End: 2025-07-23

## 2025-07-23 RX ORDER — SODIUM CHLORIDE 9 MG/ML
INJECTION, SOLUTION INTRAVENOUS CONTINUOUS PRN
Status: DISCONTINUED | OUTPATIENT
Start: 2025-07-23 | End: 2025-07-23 | Stop reason: SURG

## 2025-07-23 RX ORDER — DROPERIDOL 2.5 MG/ML
0.62 INJECTION, SOLUTION INTRAMUSCULAR; INTRAVENOUS
Status: DISCONTINUED | OUTPATIENT
Start: 2025-07-23 | End: 2025-07-23

## 2025-07-23 RX ORDER — ONDANSETRON 2 MG/ML
4 INJECTION INTRAMUSCULAR; INTRAVENOUS EVERY 6 HOURS PRN
Status: DISCONTINUED | OUTPATIENT
Start: 2025-07-23 | End: 2025-07-24 | Stop reason: HOSPADM

## 2025-07-23 RX ORDER — HYDROCODONE BITARTRATE AND ACETAMINOPHEN 5; 325 MG/1; MG/1
1 TABLET ORAL ONCE AS NEEDED
Status: DISCONTINUED | OUTPATIENT
Start: 2025-07-23 | End: 2025-07-23

## 2025-07-23 RX ORDER — HYDROCODONE BITARTRATE AND ACETAMINOPHEN 5; 325 MG/1; MG/1
1 TABLET ORAL EVERY 4 HOURS PRN
Refills: 0 | Status: DISCONTINUED | OUTPATIENT
Start: 2025-07-23 | End: 2025-07-24 | Stop reason: HOSPADM

## 2025-07-23 RX ORDER — LIDOCAINE HYDROCHLORIDE 20 MG/ML
INJECTION, SOLUTION INFILTRATION; PERINEURAL
Status: DISCONTINUED | OUTPATIENT
Start: 2025-07-23 | End: 2025-07-23 | Stop reason: HOSPADM

## 2025-07-23 RX ORDER — IOPAMIDOL 755 MG/ML
INJECTION, SOLUTION INTRAVASCULAR
Status: DISCONTINUED | OUTPATIENT
Start: 2025-07-23 | End: 2025-07-23 | Stop reason: HOSPADM

## 2025-07-23 RX ORDER — CARVEDILOL 6.25 MG/1
6.25 TABLET ORAL 2 TIMES DAILY WITH MEALS
Status: DISCONTINUED | OUTPATIENT
Start: 2025-07-23 | End: 2025-07-24 | Stop reason: HOSPADM

## 2025-07-23 RX ORDER — TEMAZEPAM 15 MG/1
15 CAPSULE ORAL NIGHTLY PRN
Status: DISCONTINUED | OUTPATIENT
Start: 2025-07-23 | End: 2025-07-24 | Stop reason: HOSPADM

## 2025-07-23 RX ORDER — PANTOPRAZOLE SODIUM 40 MG/1
40 TABLET, DELAYED RELEASE ORAL
Status: DISCONTINUED | OUTPATIENT
Start: 2025-07-24 | End: 2025-07-24 | Stop reason: HOSPADM

## 2025-07-23 RX ORDER — ACETAMINOPHEN 325 MG/1
650 TABLET ORAL EVERY 4 HOURS PRN
Status: DISCONTINUED | OUTPATIENT
Start: 2025-07-23 | End: 2025-07-24 | Stop reason: HOSPADM

## 2025-07-23 RX ORDER — PROMETHAZINE HYDROCHLORIDE 25 MG/1
25 SUPPOSITORY RECTAL ONCE AS NEEDED
Status: DISCONTINUED | OUTPATIENT
Start: 2025-07-23 | End: 2025-07-23

## 2025-07-23 RX ORDER — PROTAMINE SULFATE 10 MG/ML
INJECTION, SOLUTION INTRAVENOUS AS NEEDED
Status: DISCONTINUED | OUTPATIENT
Start: 2025-07-23 | End: 2025-07-23 | Stop reason: SURG

## 2025-07-23 RX ADMIN — BUSPIRONE HYDROCHLORIDE 10 MG: 5 TABLET ORAL at 14:53

## 2025-07-23 RX ADMIN — SODIUM CHLORIDE 50 ML/HR: 9 INJECTION, SOLUTION INTRAVENOUS at 15:02

## 2025-07-23 RX ADMIN — PROPOFOL 200 MG: 10 INJECTION, EMULSION INTRAVENOUS at 10:36

## 2025-07-23 RX ADMIN — EPHEDRINE SULFATE 10 MG: 50 INJECTION INTRAVENOUS at 11:10

## 2025-07-23 RX ADMIN — ONDANSETRON 4 MG: 2 INJECTION INTRAMUSCULAR; INTRAVENOUS at 11:54

## 2025-07-23 RX ADMIN — PROTAMINE SULFATE 30 MG: 10 INJECTION, SOLUTION INTRAVENOUS at 11:55

## 2025-07-23 RX ADMIN — ROCURONIUM BROMIDE 10 MG: 10 INJECTION INTRAVENOUS at 11:49

## 2025-07-23 RX ADMIN — SUGAMMADEX 200 MG: 100 INJECTION, SOLUTION INTRAVENOUS at 11:59

## 2025-07-23 RX ADMIN — LIDOCAINE HYDROCHLORIDE 40 MG: 20 INJECTION, SOLUTION INFILTRATION; PERINEURAL at 10:35

## 2025-07-23 RX ADMIN — ROCURONIUM BROMIDE 60 MG: 10 INJECTION INTRAVENOUS at 10:37

## 2025-07-23 RX ADMIN — FOLIC ACID 1 MG: 1 TABLET ORAL at 14:53

## 2025-07-23 RX ADMIN — ASPIRIN 81 MG CHEWABLE TABLET 81 MG: 81 TABLET CHEWABLE at 14:53

## 2025-07-23 RX ADMIN — LAMOTRIGINE 25 MG: 100 TABLET ORAL at 14:53

## 2025-07-23 RX ADMIN — CEFAZOLIN 2 G: 2 INJECTION, POWDER, LYOPHILIZED, FOR SOLUTION INTRAVENOUS at 10:56

## 2025-07-23 RX ADMIN — SODIUM CHLORIDE: 9 INJECTION, SOLUTION INTRAVENOUS at 10:30

## 2025-07-23 RX ADMIN — Medication 500 MCG: at 14:54

## 2025-07-23 RX ADMIN — HEPARIN SODIUM 14000 UNITS: 1000 INJECTION, SOLUTION INTRAVENOUS; SUBCUTANEOUS at 11:20

## 2025-07-23 RX ADMIN — DEXAMETHASONE SODIUM PHOSPHATE 4 MG: 4 INJECTION, SOLUTION INTRAMUSCULAR; INTRAVENOUS at 11:00

## 2025-07-23 RX ADMIN — EPHEDRINE SULFATE 15 MG: 50 INJECTION INTRAVENOUS at 11:47

## 2025-07-23 RX ADMIN — MIDAZOLAM 1 MG: 1 INJECTION INTRAMUSCULAR; INTRAVENOUS at 10:22

## 2025-07-23 RX ADMIN — BUSPIRONE HYDROCHLORIDE 10 MG: 5 TABLET ORAL at 22:03

## 2025-07-23 RX ADMIN — FAMOTIDINE 20 MG: 10 INJECTION INTRAVENOUS at 09:35

## 2025-07-23 RX ADMIN — Medication 100 MG: at 14:53

## 2025-07-23 NOTE — Clinical Note
Hemostasis started on the left femoral vein. Manual pressure applied to vessel. Manual pressure was held by DAREK RTR. Manual pressure was held for 10 min. Hemostasis achieved successfully. Closure device additional comment: 4x4 and gauze

## 2025-07-23 NOTE — Clinical Note
Hemostasis started on the right femoral vein. Perclose and figure 8 suturing was used in achieving hemostasis. Closure device deployed in the vessel. Hemostasis achieved successfully.

## 2025-07-23 NOTE — ANESTHESIA PREPROCEDURE EVALUATION
Anesthesia Evaluation     Patient summary reviewed and Nursing notes reviewed   history of anesthetic complications:                Airway   Mallampati: III  TM distance: >3 FB  Neck ROM: full  Dental - normal exam     Pulmonary    Cardiovascular     ECG reviewed  PT is on anticoagulation therapy    (+) hypertension, dysrhythmias Atrial Fib, hyperlipidemia      Neuro/Psych  (+) CVA, psychiatric history Anxiety and Depression  GI/Hepatic/Renal/Endo    (+) GERD, renal disease-    Musculoskeletal     Abdominal    Substance History   (+) alcohol use     OB/GYN          Other                          Anesthesia Plan    ASA 3     general     (I have reviewed the patient's history with the patient and the chart, including all pertinent laboratory results and imaging. I have explained the risks of anesthesia including but not limited to dental damage, corneal abrasion, nerve injury, MI, stroke, and death. Questions asked and answered. Anesthetic plan discussed with patient and team as indicated. Patient expressed understanding of the above.    MEGHANN)  intravenous induction     Anesthetic plan, risks, benefits, and alternatives have been provided, discussed and informed consent has been obtained with: patient.        CODE STATUS:

## 2025-07-23 NOTE — ANESTHESIA POSTPROCEDURE EVALUATION
Patient: Flako Coreas Sr.    Procedure Summary       Date: 07/23/25 Room / Location: [facility] /  [facility] CATH INVASIVE LOCATION    Anesthesia Start: 1030 Anesthesia Stop: 1217    Procedure: Atrial Appendage Occlusion via Watchman Diagnosis:       PAF (paroxysmal atrial fibrillation)      Recurrent syncope      Recurrent falls    Providers: Saúl Be MD Provider: Ector Alvarez MD    Anesthesia Type: general ASA Status: 3            Anesthesia Type: general    Vitals  Vitals Value Taken Time   BP 94/64 07/23/25 12:45   Temp 36.6 °C (97.9 °F) 07/23/25 12:15   Pulse 48 07/23/25 12:52   Resp 16 07/23/25 12:45   SpO2 94 % 07/23/25 12:51   Vitals shown include unfiled device data.        Post Anesthesia Care and Evaluation    Patient location during evaluation: bedside  Pain management: adequate    Airway patency: patent  Anesthetic complications: No anesthetic complications    Cardiovascular status: acceptable  Respiratory status: acceptable  Hydration status: acceptable

## 2025-07-23 NOTE — Clinical Note
A 4 fr sheath was successfully inserted using micropuncture technique with ultrasound guidance into the left femoral vein. Sheath insertion not delayed.

## 2025-07-23 NOTE — DISCHARGE INSTRUCTIONS
[facility]  [address] [place] [address]                LEFT ATRIAL APPENDAGE OCCLUSION AFTERCARE INSTRUCTIONS        Refer to this sheet in the next few weeks. These instructions provide you with information on caring for yourself after your procedure. Your health care provider may also give you more specific instructions. Your treatment has been planned according to current medical practices, but problems sometimes occur. Call your health care provider if you have any problems or questions after your procedure.      What to Expect After the Procedure:  After your procedure, it is typical to have the following sensations:  Minor discomfort or tenderness and a small bump at the catheter insertion site. The bump should usually decrease in size and tenderness within 1 to 2 weeks.  Any bruising will usually fade within 2 to 4 weeks.    Home Care Instructions:  Do not apply powder or lotion to the site.  Do not take baths, swim, or use a hot tub until your health care provider approves and the site is completely healed.  Do not bend, squat, or lift anything over 20 lb (9 kg) or as directed by your health care provider. However, we recommend lifting nothing heavier than a gallon of milk.    You may shower 24 hours after the procedure. Remove the bandage (dressing) and gently wash the site with plain soap and water. Gently pat the site dry. You may apply a band aid daily for 2 days if desired.    Inspect the site at least twice daily.  Increase your fluid intake for the next 2 days.    Limit your activity for the first 48 hours. .    Avoid strenuous activity for 1 week or as advised by your physician.    Follow instructions about when you can drive or return to work as directed by your physician.    Hold direct pressure over the site when you cough, sneeze, laugh or change positions.  Do this for the next 2 days.    Do not operate machinery or power tools for 24 hours.  A responsible adult should  be with you for the first 24 hours after you arrive home. Do not make any important legal decisions or sign legal papers for 24 hours.  Do not drink alcohol for 24 hours.  Metformin or any medications containing Metformin should not be taken for 48 hours after your procedure.      Call Your Doctor If:  You have drainage (other than a small amount of blood on the dressing).  You have chills or a fever > 101.  You have redness, warmth, swelling(larger than a walnut), or pain at the insertion site  You develop chest pain or shortness of breath, feel faint, or pass out.  You develop pain, discoloration, coldness, numbness, tingling, or severe bruising in the leg that held the catheter.  You develop bleeding from any other place, such as the bowels.  You have heavy bleeding from the site, hold pressure on the site for 20 minutes.  If the bleeding stops, apply a fresh bandage and call your cardiologist.  However, if you continue to have bleeding, call 911.  You have any symptoms of a stroke.  Remember BE FAST  B-balance. Sudden trouble walking or loss of balance.  E-eyes.  Sudden changes in how you see or a sudden onset of a very bad headache.   F-face. Sudden weakness or loss of feeling of the face or facial droop on one side.   A-arms Sudden weakness or numbness in one arm.  One arm drifts down if they are both held out in front of you. This happens suddenly and usually on one side of the body.  S-speech.  Sudden trouble speaking, slurred speech or trouble understanding what people are saying.   T-time  Time to call emergency services.  Write down the symptoms and the time they started.        Make Sure You:  Understand these instructions.  Will watch your condition.  Will get help right away if you are not doing well or get worse.[facility]  [address] [place] [address]      Coronary Angiogram (Femoral Approach) After Care     Refer to this sheet in the next few weeks. These instructions provide  you with information on caring for yourself after your procedure. Your health care provider may also give you more specific instructions. Your treatment has been planned according to current medical practices, but problems sometimes occur. Call your health care provider if you have any problems or questions after your procedure.      What to Expect After the Procedure:  After your procedure, it is typical to have the following sensations:  Minor discomfort or tenderness and a small bump at the catheter insertion site. The bump should usually decrease in size and tenderness within 1 to 2 weeks.  Any bruising will usually fade within 2 to 4 weeks.    Home Care Instructions:  Do not apply powder or lotion to the site.  Do not take baths, swim, or use a hot tub until your health care provider approves and the site is completely healed.  Do not bend, squat, or lift anything over 20 lb (9 kg) or as directed by your health care provider. However, we recommend lifting nothing heavier than a gallon of milk.    You may shower 24 hours after the procedure. Remove the bandage (dressing) and gently wash the site with plain soap and water. Gently pat the site dry. You may apply a band aid daily for 2 days if desired.    Inspect the site at least twice daily.  Increase your fluid intake for the next 2 days.    Limit your activity for the first 48 hours. .    Avoid strenuous activity for 1 week or as advised by your physician.    Follow instructions about when you can drive or return to work as directed by your physician.    Hold direct pressure over the site when you cough, sneeze, laugh or change positions.  Do this for the next 2 days.    Do not operate machinery or power tools for 24 hours.  A responsible adult should be with you for the first 24 hours after you arrive home. Do not make any important legal decisions or sign legal papers for 24 hours.  Do not drink alcohol for 24 hours.  Metformin or any medications containing  Metformin should not be taken for 48 hours after your procedure.      Call Your Doctor If:  You have drainage (other than a small amount of blood on the dressing).  You have chills or a fever > 101.  You have redness, warmth, swelling(larger than a walnut), or pain at the insertion site  You develop chest pain or shortness of breath, feel faint, or pass out.  You develop pain, discoloration, coldness, numbness, tingling, or severe bruising in the leg that held the catheter.  You develop bleeding from any other place, such as the bowels.  You have heavy bleeding from the site, hold pressure on the site for 20 minutes.  If the bleeding stops, apply a fresh bandage and call your cardiologist.  However, if you continue to have bleeding, call 911.  You have any symptoms of a stroke.  Remember BE FAST  B-balance. Sudden trouble walking or loss of balance.  E-eyes.  Sudden changes in how you see or a sudden onset of a very bad headache.   F-face. Sudden weakness or loss of feeling of the face or facial droop on one side.   A-arms Sudden weakness or numbness in one arm.  One arm drifts down if they are both held out in front of you. This happens suddenly and usually on one side of the body.  S-speech.  Sudden trouble speaking, slurred speech or trouble understanding what people are saying.   T-time  Time to call emergency services.  Write down the symptoms and the time they started.        Make Sure You:  Understand these instructions.  Will watch your condition.  Will get help right away if you are not doing well or get worse.

## 2025-07-23 NOTE — ANESTHESIA PROCEDURE NOTES
Airway  Date/Time: 7/23/2025 10:39 AM    General Information and Staff    Patient location during procedure: OR  Anesthesiologist: Ector Alvarez MD    Indications and Patient Condition    Preoxygenated: yes    Mask difficulty assessment: 2 - vent by mask + OA or adjuvant +/- NMBA    Final Airway Details    Final airway type: endotracheal airway      Successful airway: ETT  Cuffed: yes   Successful intubation technique: video laryngoscopy  Adjuncts used in placement: intubating stylet  Endotracheal tube insertion site: oral  Blade: CMAC  Blade size: D  ETT size (mm): 8.0  Cormack-Lehane Classification: grade I - full view of glottis  Placement verified by: capnometry   Measured from: teeth  ETT/EBT  to teeth (cm): 23  Number of attempts at approach: 1  Assessment: lips, teeth, and gum same as pre-op and atraumatic intubation

## 2025-07-23 NOTE — ANESTHESIA PROCEDURE NOTES
Diagnostic IntraOp Edward    Procedure Performed: Diagnostic IntraOp Edward       Start Time:  7/23/2025 10:40 AM       End Time:   7/23/2025 11:38 AM      General Procedure Information  Physician Requesting Echo: Saúl Be MD  Location performed:  OR  Intubated  Bite block not placed  Heart visualized  Probe Insertion:  Easy  Probe Type:  Multiplane  Modalities:  Color flow mapping    Echocardiographic and Doppler Measurements    Ventricles    Right Ventricle:  Cavity size normal.    Left Ventricle:  Cavity size normal.  Global Function normal.  Ejection Fraction 60%.          Valves    Aortic Valve:  Annulus normal.  Stenosis not present.      Mitral Valve:  Annulus normal.  Stenosis not present.  Regurgitation trace.      Tricuspid Valve:  Annulus normal.  Stenosis not present.  Regurgitation mild.            Atria    Right Atrium:  Spontaneous echo contrast not present.    Left Atrium:  Spontaneous echo contrast present.  Left atrial appendage normal.              Other Findings  Pericardium:  pericardial effusion      Anesthesia Information      Echocardiogram Comments:       Diagnosis:nonrheumatic tricuspid regurgitation    Post: pericardial effusion unchanged from baseline.  LAAO device in left atrial appendage, passes tug test, no color around device.

## 2025-07-24 ENCOUNTER — APPOINTMENT (OUTPATIENT)
Dept: CARDIOLOGY | Facility: HOSPITAL | Age: 60
End: 2025-07-24
Payer: COMMERCIAL

## 2025-07-24 ENCOUNTER — READMISSION MANAGEMENT (OUTPATIENT)
Dept: CALL CENTER | Facility: HOSPITAL | Age: 60
End: 2025-07-24
Payer: COMMERCIAL

## 2025-07-24 VITALS
WEIGHT: 220 LBS | OXYGEN SATURATION: 97 % | BODY MASS INDEX: 28.23 KG/M2 | RESPIRATION RATE: 16 BRPM | HEART RATE: 59 BPM | SYSTOLIC BLOOD PRESSURE: 146 MMHG | TEMPERATURE: 98.4 F | HEIGHT: 74 IN | DIASTOLIC BLOOD PRESSURE: 92 MMHG

## 2025-07-24 LAB
ANION GAP SERPL CALCULATED.3IONS-SCNC: 11.2 MMOL/L (ref 5–15)
BUN SERPL-MCNC: 18 MG/DL (ref 6–20)
BUN/CREAT SERPL: 19.1 (ref 7–25)
CALCIUM SPEC-SCNC: 8.3 MG/DL (ref 8.6–10.5)
CHLORIDE SERPL-SCNC: 109 MMOL/L (ref 98–107)
CO2 SERPL-SCNC: 18.8 MMOL/L (ref 22–29)
CREAT SERPL-MCNC: 0.94 MG/DL (ref 0.76–1.27)
DEPRECATED RDW RBC AUTO: 46.3 FL (ref 37–54)
EGFRCR SERPLBLD CKD-EPI 2021: 94 ML/MIN/1.73
ERYTHROCYTE [DISTWIDTH] IN BLOOD BY AUTOMATED COUNT: 13.9 % (ref 12.3–15.4)
GLUCOSE SERPL-MCNC: 106 MG/DL (ref 65–99)
HCT VFR BLD AUTO: 42.2 % (ref 37.5–51)
HGB BLD-MCNC: 13.3 G/DL (ref 13–17.7)
MCH RBC QN AUTO: 28.9 PG (ref 26.6–33)
MCHC RBC AUTO-ENTMCNC: 31.5 G/DL (ref 31.5–35.7)
MCV RBC AUTO: 91.7 FL (ref 79–97)
PLATELET # BLD AUTO: 211 10*3/MM3 (ref 140–450)
PMV BLD AUTO: 9.9 FL (ref 6–12)
POTASSIUM SERPL-SCNC: 4.2 MMOL/L (ref 3.5–5.2)
RBC # BLD AUTO: 4.6 10*6/MM3 (ref 4.14–5.8)
SODIUM SERPL-SCNC: 139 MMOL/L (ref 136–145)
WBC NRBC COR # BLD AUTO: 11.51 10*3/MM3 (ref 3.4–10.8)

## 2025-07-24 PROCEDURE — 93325 DOPPLER ECHO COLOR FLOW MAPG: CPT

## 2025-07-24 PROCEDURE — 99239 HOSP IP/OBS DSCHRG MGMT >30: CPT | Performed by: INTERNAL MEDICINE

## 2025-07-24 PROCEDURE — 80048 BASIC METABOLIC PNL TOTAL CA: CPT | Performed by: INTERNAL MEDICINE

## 2025-07-24 PROCEDURE — 93308 TTE F-UP OR LMTD: CPT | Performed by: STUDENT IN AN ORGANIZED HEALTH CARE EDUCATION/TRAINING PROGRAM

## 2025-07-24 PROCEDURE — 93325 DOPPLER ECHO COLOR FLOW MAPG: CPT | Performed by: STUDENT IN AN ORGANIZED HEALTH CARE EDUCATION/TRAINING PROGRAM

## 2025-07-24 PROCEDURE — 93308 TTE F-UP OR LMTD: CPT

## 2025-07-24 PROCEDURE — 85027 COMPLETE CBC AUTOMATED: CPT | Performed by: INTERNAL MEDICINE

## 2025-07-24 RX ORDER — ATORVASTATIN CALCIUM 40 MG/1
20 TABLET, FILM COATED ORAL NIGHTLY
Qty: 45 TABLET | Refills: 3 | Status: SHIPPED | OUTPATIENT
Start: 2025-07-24 | End: 2026-07-24

## 2025-07-24 RX ORDER — VALACYCLOVIR HYDROCHLORIDE 1 G/1
1000 TABLET, FILM COATED ORAL
Qty: 21 TABLET | Refills: 0 | Status: SHIPPED | OUTPATIENT
Start: 2025-07-24 | End: 2025-07-31

## 2025-07-24 RX ORDER — CARVEDILOL 3.12 MG/1
3.12 TABLET ORAL 2 TIMES DAILY WITH MEALS
Qty: 60 TABLET | Refills: 3 | Status: SHIPPED | OUTPATIENT
Start: 2025-07-24

## 2025-07-24 RX ADMIN — Medication 500 MCG: at 08:52

## 2025-07-24 RX ADMIN — LISINOPRIL 20 MG: 10 TABLET ORAL at 08:46

## 2025-07-24 RX ADMIN — BUSPIRONE HYDROCHLORIDE 10 MG: 5 TABLET ORAL at 06:43

## 2025-07-24 RX ADMIN — Medication 100 MG: at 08:46

## 2025-07-24 RX ADMIN — VILAZODONE HYDROCHLORIDE 40 MG: 40 TABLET, FILM COATED ORAL at 08:46

## 2025-07-24 RX ADMIN — ASPIRIN 81 MG CHEWABLE TABLET 81 MG: 81 TABLET CHEWABLE at 08:46

## 2025-07-24 RX ADMIN — FOLIC ACID 1 MG: 1 TABLET ORAL at 08:46

## 2025-07-24 RX ADMIN — PANTOPRAZOLE SODIUM 40 MG: 40 TABLET, DELAYED RELEASE ORAL at 06:44

## 2025-07-24 RX ADMIN — CARVEDILOL 6.25 MG: 6.25 TABLET, FILM COATED ORAL at 08:46

## 2025-07-24 RX ADMIN — LAMOTRIGINE 25 MG: 100 TABLET ORAL at 08:46

## 2025-07-24 NOTE — PLAN OF CARE
Goal Outcome Evaluation:  Plan of Care Reviewed With: patient           Outcome Evaluation: Pt alert orient, Sinus Pa on the monitior,RA, pt blood pressure elevated after midnight cardiology notified other vital signs stable, call right within reach and care progressing.

## 2025-07-24 NOTE — PROGRESS NOTES
[facility] Clinical Pharmacy Services: Pharmacy Education - Direct Oral Anticoagulant - Eliquis    Flako KAVEH Coreas . has been ordered Eliquis s/p Watchman procedure.     Counseling points included the following:  Eliquis' indication, patient's need for the medication, and dosing/frequency of this medication.  Enforced the importance of taking his medication as instructed every day and the reason why the medication is dosed that way.  Explained possible side effects of Eliquis, including increased risk of bruising and bleeding. Also talked about ways to control bleeding for minor cuts and scrapes.  Emphasized the importance of going to the emergency room if any of the following occur: Falling and hitting your head; noticing bright red blood in urine or dark/tarry stools; vomiting up blood or vomit has a coffee-ground like texture; coughing up blood.  Discussed all important drug interactions, including over-the-counter medications (ibuprofen, naproxen, Aleve).  Instructed the patient not to begin or discontinue any medications without informing his physician.    Patient expressed understanding and had no further questions.      Bonnie Carcamo, Pharm.D., [facility]   Clinical Pharmacist   Phone Extension #[phone]

## 2025-07-24 NOTE — PAYOR COMM NOTE
"Flako Rocha Sr. (60 y.o. Male)                                ATTENTION NOTIFICATION OF ADMISSION AND DISCHARGE -  CASE [id number]                             REPLY TO [facility] DEPT  [phone] OR CALL                         Date of Birth   1965    Social Security Number       Address   [address] [id number]    Home Phone   [phone]    MRN   [id number]       Yarsanism   Orthodoxy    Marital Status                               Admission Date   7/23/2025    Admission Type   Elective    Admitting Provider   Saúl Be MD    Attending Provider       Department, Room/Bed   [facility] CARDIOVASC UNIT, 2213/1       Discharge Date   7/24/2025    Discharge Disposition   Home or Self Care    Discharge Destination                                 Attending Provider: (none)   Allergies: No Known Allergies    Isolation: None   Infection: None   Code Status: CPR    Ht: 188 cm (74\")   Wt: 99.8 kg (220 lb)    Admission Cmt: None   Principal Problem: Atrial fibrillation [I48.91]                   Active Insurance as of 7/23/2025       Primary Coverage       Payor Plan Insurance Group Employer/Plan Group    [employer] EXCHANGE [employer] EXCHANGE NGN       Payor Plan Address Payor Plan Phone Number Payor Plan Fax Number Effective Dates    [address] [phone]  1/1/2025 - None Entered    [facility] [id number]         Subscriber Name Subscriber Birth Date Member ID       FLAKO ROCHA SR. 1965 [id number]                     Emergency Contacts        (Rel.) Home Phone Work Phone Mobile Phone    Flako Rocha (Son) [phone] -- [phone]                 History & Physical        Saúl Be MD at 07/23/25 1019            H&P reviewed. The patient was examined and there are no changes to the H&P.          Electronically signed by Saúl Be MD at 07/23/25 1019   " Source Note: H&P (View-Only)            Flako Coreas .  1965  Date of Office Visit: 07/07/25  Encounter Provider: Saúl Be MD  Place of Service: [facility] CARDIOLOGY      CHIEF COMPLAINT:  Paroxysmal atrial fibrillation  Recurrent syncope   Prior CVA      HISTORY OF PRESENT ILLNESS:  60-year-old male with a medical history of paroxysmal atrial fibrillation, essential hypertension, recurrent syncopal episodes, who has a loop recorder in place.  He follows with Dr. Taylor and is previously seeing Karla Cary.  He was recently in the hospital with a another syncopal episode that looks to be in the setting of dehydration and hypotension.  He also has reported prior GI bleed.  His blood pressure is lower today but he did take an extra lisinopril last night due to a systolic above 150 mmHg.  Denies any recent issues with chest pain or dyspnea.    Review of Systems   Constitutional: Negative for fever and malaise/fatigue.   HENT:  Negative for nosebleeds and sore throat.    Eyes:  Negative for blurred vision and double vision.   Cardiovascular:  Negative for chest pain, claudication, palpitations and syncope.   Respiratory:  Negative for cough, shortness of breath and snoring.    Endocrine: Negative for cold intolerance, heat intolerance and polydipsia.   Skin:  Negative for itching, poor wound healing and rash.   Musculoskeletal:  Negative for joint pain, joint swelling, muscle weakness and myalgias.   Gastrointestinal:  Negative for abdominal pain, melena, nausea and vomiting.   Neurological:  Negative for light-headedness, loss of balance, seizures, vertigo and weakness.   Psychiatric/Behavioral:  Negative for altered mental status and depression.        Past Medical History:   Diagnosis Date    ADHD (attention deficit hyperactivity disorder)     On going issue    Alcohol abuse     Anxiety     Lepe's esophagus     Benign prostatic hyperplasia Surgery in 12/2021     Brain concussion 11/2023    Clotting disorder Intestinal    Depression     Diverticulitis     Diverticulosis     Duodenitis     Enteritis     Failure to thrive (child)     Family history of blood clots     Fracture of wrist 1985    Fracture, foot 2019    GERD (gastroesophageal reflux disease)     Hyperlipidemia     Hypertension     Hypertensive emergency     Implantable loop recorder present 03/12/2025    Irritable bowel syndrome 2017    Low testosterone     Microcytosis     Pancreatitis     Peptic ulcer disease 02/23/2025    Pneumonia     PONV (postoperative nausea and vomiting)     Seasonal affective disorder     Shoulder injury     Stroke     Unexplained weight loss     Visual impairment 8/2023       The following portions of the patient's history were reviewed and updated as appropriate: Social history , Family history, and Surgical history     Current Outpatient Medications on File Prior to Visit   Medication Sig Dispense Refill    amLODIPine (NORVASC) 10 MG tablet Take 1 tablet by mouth Daily. 90 tablet 3    aspirin 81 MG chewable tablet Chew 1 tablet Daily.      atorvastatin (LIPITOR) 40 MG tablet Take 1 tablet by mouth Every Night. 90 tablet 3    busPIRone (BUSPAR) 10 MG tablet Take 1 tablet by mouth Every 8 (Eight) Hours. 90 tablet 0    carvedilol (COREG) 12.5 MG tablet Take 1 tablet by mouth 2 (Two) Times a Day With Meals. 180 tablet 3    cloNIDine (CATAPRES) 0.1 MG tablet Take 1 tablet by mouth Daily As Needed (anxiety). 30 tablet 0    folic acid (FOLVITE) 1 MG tablet Take 1 tablet by mouth Daily. 30 tablet 0    lamoTRIgine (LaMICtal) 25 MG tablet Take 1 tab by mouth daily x 2 weeks, if tolerated without discussed side effects increase to 1 tab by mouth twice daily thereafter. 42 tablet 1    lisinopril (PRINIVIL,ZESTRIL) 40 MG tablet Take 0.5 tablets by mouth Daily.      pantoprazole (PROTONIX) 40 MG EC tablet Take 1 tablet by mouth Every Morning Before Breakfast. 30 tablet 0    thiamine (VITAMIN B1) 100  "MG tablet Take 1 tablet by mouth Daily. 30 tablet 0    vilazodone (Viibryd) 40 MG tablet tablet Take 1 tablet by mouth Daily. 30 tablet 6    vitamin B-12 (CYANOCOBALAMIN) 500 MCG tablet Take 1 tablet by mouth Daily. 90 tablet 0     No current facility-administered medications on file prior to visit.       No Known Allergies    Vitals:    07/07/25 1037   BP: 102/60   Pulse: 60   Weight: 103 kg (228 lb)   Height: 188 cm (74\")     Body mass index is 29.27 kg/m².   Constitutional:       Appearance: Well-developed.   Eyes:      General: No scleral icterus.     Conjunctiva/sclera: Conjunctivae normal.   HENT:      Head: Normocephalic and atraumatic.   Neck:      Thyroid: No thyromegaly.      Vascular: Normal carotid pulses. No carotid bruit, hepatojugular reflux or JVD.      Trachea: No tracheal deviation.   Pulmonary:      Effort: No respiratory distress.      Breath sounds: Normal breath sounds. No decreased breath sounds. No wheezing. No rhonchi. No rales.   Chest:      Chest wall: Not tender to palpatation.   Cardiovascular:      Normal rate. Regular rhythm.      No gallop.    Pulses:     Carotid: 2+ bilaterally.     Radial: 2+ bilaterally.     Femoral: 2+ bilaterally.     Dorsalis pedis: 2+ bilaterally.     Posterior tibial: 2+ bilaterally.  Edema:     Peripheral edema absent.   Abdominal:      General: Bowel sounds are normal. There is no distension.      Palpations: Abdomen is soft.      Tenderness: There is no abdominal tenderness.   Musculoskeletal:         General: No deformity.      Cervical back: Normal range of motion and neck supple. Skin:     Findings: No erythema or rash.   Neurological:      Mental Status: Alert and oriented to person, place, and time.      Sensory: No sensory deficit.   Psychiatric:         Behavior: Behavior normal.         Lab Results   Component Value Date    WBC 8.44 06/26/2025    HGB 14.2 06/26/2025    HCT 42.4 06/26/2025    MCV 85.5 06/26/2025     06/26/2025       Lab " Results   Component Value Date    GLUCOSE 98 06/26/2025    BUN 19.0 06/26/2025    CREATININE 1.01 06/26/2025     06/26/2025    K 4.6 06/26/2025     06/26/2025    CALCIUM 8.8 06/26/2025    PROTEINTOT 7.4 06/23/2025    ALBUMIN 4.3 06/23/2025    ALT 26 06/23/2025    AST 18 06/23/2025    ALKPHOS 72 06/23/2025    BILITOT 0.4 06/23/2025    GLOB 3.1 06/23/2025    AGRATIO 1.4 06/23/2025    BCR 18.8 06/26/2025    ANIONGAP 8.9 06/26/2025    EGFR 86.2 06/26/2025       Lab Results   Component Value Date    GLUCOSE 98 06/26/2025    CALCIUM 8.8 06/26/2025     06/26/2025    K 4.6 06/26/2025    CO2 23.1 06/26/2025     06/26/2025    BUN 19.0 06/26/2025    CREATININE 1.01 06/26/2025    EGFR 86.2 06/26/2025    BCR 18.8 06/26/2025    ANIONGAP 8.9 06/26/2025       Lab Results   Component Value Date    CHOL 163 01/26/2025    CHLPL 215 (H) 05/11/2022    TRIG 154 (H) 01/26/2025    HDL 33 (L) 01/26/2025     (H) 01/26/2025       Procedures     Results for orders placed during the hospital encounter of 01/25/25    Adult Transthoracic Echo Complete W/ Cont if Necessary Per Protocol 01/26/2025  1:20 PM    Interpretation Summary    Left ventricular ejection fraction appears to be 56 - 60%.    The left ventricular cavity is borderline dilated.    Left ventricular diastolic function was normal.    Estimated right ventricular systolic pressure from tricuspid regurgitation is normal (<35 mmHg).      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  He has not been on anticoagulant therapy recently secondary to his recurrent syncopal episode and falls.  I have reviewed his prior neurologic workup along with his prior transesophageal echocardiogram and I do think he is an appropriate candidate to consider  left atrial appendage occlusion.  His left atrial appendage anatomy is acceptable.  This was evaluated in 2023 with a transesophageal echocardiogram    1.  Paroxysmal atrial fibrillation  - Not on anticoagulant therapy in the setting of a [id number] score of 3.  - Continue carvedilol at current dose.  - Recommend moving forward with left atrial appendage occlusion.  - Risks and benefits of the procedure have been discussed with the patient and he is aware.    2.  Essential hypertension: I discouraged him from taking additional doses of lisinopril therapy in the evening unless his systolic is above 160 mmHg as his blood pressure is currently low normal and he has had syncopal episodes in the recent past.    Electronically signed by Saúl Be MD at 07/07/25 1131                        Vital Signs (last 2 days) before discharge       Date/Time Temp Temp src Pulse Resp BP Patient Position SpO2    07/24/25 1112 -- -- -- -- 146/92 -- --    07/24/25 0840 -- -- 59 -- 141/104 -- 97    07/24/25 0752 98.4 (36.9) Oral 59 16 138/99 Lying 100    07/24/25 0352 98.3 (36.8) Oral 63 16 159/110 Lying 96    07/24/25 0348 -- -- -- -- 141/97 Lying 97    07/24/25 0341 -- -- -- -- 146/87 -- --    07/24/25 0300 -- -- 53 -- -- -- --    07/24/25 0033 98.2 (36.8) Oral 53 16 104/71 Lying 97    07/23/25 1938 98.3 (36.8) Oral 50 16 110/66 Lying 98    07/23/25 1740 -- -- 47 -- -- -- 97    07/23/25 1500 -- -- 50 -- 107/74 -- 97    07/23/25 1430 -- -- 60 -- 107/80 -- 98    07/23/25 1415 -- -- 49 -- 98/70 -- 97    07/23/25 1400 -- -- 53 -- 102/67 -- 98    07/23/25 1345 -- -- 50 -- 92/62 -- --    07/23/25 1336 -- -- 40 -- -- -- --    07/23/25 1335 -- -- 49 -- 98/72 -- --    07/23/25 1315 -- -- 48 16 98/73 -- 96    07/23/25 1300 -- -- 49 16 102/69 -- 95    07/23/25 1245 -- -- 49 16 94/64 -- 95    07/23/25 1235 -- -- 52 16 97/69 -- 95    07/23/25 1230 -- -- 54 16 98/61 -- 94    07/23/25 1225 -- -- 53 16 100/67 -- 95    07/23/25 1220 -- --  50 16 105/67 -- 97    07/23/25 1215 97.9 (36.6) Oral 50 16 103/66 Lying 98    07/23/25 0852 96.3 (35.7) Temporal 59 16 134/96 Lying 98          Oxygen Therapy (last 2 days) before discharge       Date/Time SpO2 Device (Oxygen Therapy) Flow (L/min) (Oxygen Therapy) Oxygen Concentration (%) ETCO2 (mmHg)    07/24/25 0840 97 -- -- -- --    07/24/25 0752 100 -- -- -- --    07/24/25 0352 96 -- -- -- --    07/24/25 0348 97 -- -- -- --    07/24/25 0033 97 -- -- -- --    07/24/25 0026 -- room air -- -- --    07/23/25 2054 -- room air -- -- --    07/23/25 1938 98 -- -- -- --    07/23/25 1740 97 -- -- -- --    07/23/25 1500 97 -- -- -- --    07/23/25 1430 98 -- -- -- --    07/23/25 1415 97 -- -- -- --    07/23/25 1400 98 -- -- -- --    07/23/25 1338 -- room air -- -- --    07/23/25 1315 96 room air -- -- --    07/23/25 1300 95 room air -- -- --    07/23/25 1245 95 room air -- -- --    07/23/25 1235 95 room air -- -- --    07/23/25 1230 94 room air -- -- --    07/23/25 1225 95 room air 3 -- --    07/23/25 1220 97 nasal cannula 3 -- --    07/23/25 1215 98 nasal cannula 3 -- --    07/23/25 0852 98 -- -- -- --          Facility-Administered Medications as of 7/24/2025   Medication Dose Route Frequency Provider Last Rate Last Admin    acetaminophen (TYLENOL) tablet 650 mg  650 mg Oral Q4H PRN Saúl Be MD        aspirin chewable tablet 81 mg  81 mg Oral Daily Saúl Be MD   81 mg at 07/24/25 0846    atorvastatin (LIPITOR) tablet 20 mg  20 mg Oral Nightly Saúl Be MD        busPIRone (BUSPAR) tablet 10 mg  10 mg Oral Q8H Saúl Be MD   10 mg at 07/24/25 0643    carvedilol (COREG) tablet 6.25 mg  6.25 mg Oral BID With Meals Saúl Be MD   6.25 mg at 07/24/25 0846    [COMPLETED] famotidine (PEPCID) injection 20 mg  20 mg Intravenous Once Saúl Singh MD   20 mg at 07/23/25 0935    folic acid (FOLVITE) tablet 1 mg  1 mg Oral Daily Saúl Be MD   1  mg at 25 0846    HYDROcodone-acetaminophen (NORCO) 5-325 MG per tablet 1 tablet  1 tablet Oral Q4H PRN Saúl Be MD        [] lactated ringers infusion  9 mL/hr Intravenous Continuous Saúl Singh MD   Held at 25 1527    lamoTRIgine (LaMICtal) tablet 25 mg  25 mg Oral Daily Saúl Be MD   25 mg at 25 0846    lisinopril (PRINIVIL,ZESTRIL) tablet 20 mg  20 mg Oral Daily Saúl Be MD   20 mg at 25 0846    nitroglycerin (NITROSTAT) SL tablet 0.4 mg  0.4 mg Sublingual Q5 Min PRN Saúl Be MD        ondansetron ODT (ZOFRAN-ODT) disintegrating tablet 4 mg  4 mg Oral Q6H PRN Saúl Be MD        Or    ondansetron (ZOFRAN) injection 4 mg  4 mg Intravenous Q6H PRN Saúl Be MD        pantoprazole (PROTONIX) EC tablet 40 mg  40 mg Oral QAM AC Saúl Be MD   40 mg at 25 0644    [] sodium chloride 0.9 % infusion  50 mL/hr Intravenous Continuous Saúl Be MD   Stopped at 25 1909    temazepam (RESTORIL) capsule 15 mg  15 mg Oral Nightly PRN Saúl Be MD        thiamine (VITAMIN B-1) tablet 100 mg  100 mg Oral Daily Saúl Be MD   100 mg at 25 0846    vilazodone (VIIBRYD) tablet 40 mg  40 mg Oral Daily Saúl Be MD   40 mg at 25 0846    vitamin B-12 (CYANOCOBALAMIN) tablet 500 mcg  500 mcg Oral Daily Saúl Be MD   500 mcg at 25 0852     Orders (all)        Start     Ordered    25 2100  atorvastatin (LIPITOR) tablet 20 mg  Nightly         25 1254    25 1002  Discharge patient  Once         25 1002    25 0900  vilazodone (VIIBRYD) tablet 40 mg  Daily         25 1254    25 0900  lisinopril (PRINIVIL,ZESTRIL) tablet 20 mg  Daily         25 1254    25 0730  pantoprazole (PROTONIX) EC tablet 40 mg  Every Morning Before Breakfast         25 1254     07/24/25 0600  CBC (No Diff)  Morning Draw         07/23/25 1254    07/24/25 0600  Basic Metabolic Panel  Morning Draw         07/23/25 1254    07/24/25 0000  Adult Transthoracic Echo Limited W/ Cont if Necessary Per Protocol  Once         07/23/25 1254    07/24/25 0000  apixaban (ELIQUIS) 5 MG tablet tablet  2 Times Daily         07/24/25 1002    07/24/25 0000  carvedilol (COREG) 3.125 MG tablet  2 Times Daily With Meals         07/24/25 1002    07/24/25 0000  atorvastatin (LIPITOR) 40 MG tablet  Nightly         07/24/25 1002    07/24/25 0000  valACYclovir (VALTREX) 1000 MG tablet  3 Times Daily With Meals         07/24/25 1144    07/23/25 1800  carvedilol (COREG) tablet 6.25 mg  2 Times Daily With Meals         07/23/25 1254    07/23/25 1630  Telemetry Scan  Once         07/23/25 1630    07/23/25 1600  Strict intake and output  Every 4 Hours       07/23/25 1254    07/23/25 1523  POC Activated Clotting Time  PROCEDURE ONCE         07/23/25 1158    07/23/25 1400  busPIRone (BUSPAR) tablet 10 mg  Every 8 Hours Scheduled         07/23/25 1254    07/23/25 1400  Incentive Spirometry  Every 2 Hours While Awake       07/23/25 1254    07/23/25 1355  Telemetry Scan  Once         07/23/25 1355    07/23/25 1347  Telemetry Scan  Once         07/23/25 1347    07/23/25 1345  vitamin B-12 (CYANOCOBALAMIN) tablet 500 mcg  Daily         07/23/25 1254    07/23/25 1345  thiamine (VITAMIN B-1) tablet 100 mg  Daily         07/23/25 1254    07/23/25 1345  folic acid (FOLVITE) tablet 1 mg  Daily         07/23/25 1254    07/23/25 1345  aspirin chewable tablet 81 mg  Daily         07/23/25 1254    07/23/25 1345  lamoTRIgine (LaMICtal) tablet 25 mg  Daily         07/23/25 1254    07/23/25 1345  sodium chloride 0.9 % infusion  Continuous         07/23/25 1254    07/23/25 1258  POC Activated Clotting Time  PROCEDURE ONCE         07/23/25 1522    07/23/25 1253  Diet: Cardiac, Diabetic; Healthy Heart (2-3 Na+); Consistent Carbohydrate; Fluid  Consistency: Thin (IDDSI 0)  Diet Effective Now         07/23/25 1254    07/23/25 1253  Reason For Not Ordering Cardiac Rehab at Discharge  Once         07/23/25 1254    07/23/25 1252  Code Status and Medical Interventions: CPR (Attempt to Resuscitate); Full Support  Continuous         07/23/25 1254    07/23/25 1252  Continuous Cardiac Monitoring  Continuous        Comments: Follow Standing Orders As Outlined in Process Instructions (Open Order Report to View Full Instructions)    07/23/25 1254    07/23/25 1252  Maintain IV Access  Continuous         07/23/25 1254    07/23/25 1252  Telemetry - Place Orders & Notify Provider of Results When Patient Experiences Acute Chest Pain, Dysrhythmia or Respiratory Distress  Continuous        Comments: Open Order Report to View Parameters Requiring Provider Notification    07/23/25 1254    07/23/25 1252  May Be Off Telemetry for Tests  Continuous         07/23/25 1254    07/23/25 1252  Encourage fluids  Until Discontinued         07/23/25 1254    07/23/25 1252  Oxygen Therapy- Nasal Cannula; Titrate 1-6 LPM Per SpO2; 90 - 95%  Continuous         07/23/25 1254    07/23/25 1252  Continuous Pulse Oximetry  Continuous         07/23/25 1254    07/23/25 1252  Advance Diet As Tolerated -  Until Discontinued         07/23/25 1254    07/23/25 1252  Notify MD if platelet count is less than 100,000, is less than 1/2 baseline, or if Hgb drops by more than 3mg/dl.  Until Discontinued         07/23/25 1254    07/23/25 1252  Notify MD of hypotension (SBP less than 95), bleeding, or dysrythmia and follow Sheath Removal Policy if needed.  Until Discontinued         07/23/25 1254    07/23/25 1252  Cardiac Rehab Evaluation and Enrollment  Once        Provider:  (Not yet assigned)    07/23/25 1254    07/23/25 1252  If patient on Metformin (Glucophage) hold for 48 hours.  Until Discontinued         07/23/25 1254    07/23/25 1252  Inpatient Admission  Once         07/23/25 1254    07/23/25 1252   "Closure Device Used  Once         07/23/25 1254    07/23/25 1252  Assess Puncture Site, Vital Signs, Distal Pulses & Observe Site for Bleeding or Swelling  Per Order Details        Comments: Every 15 Minutes x4, Every 30 Minutes x4, & Every 1 Hour x2    07/23/25 1254    07/23/25 1251  nitroglycerin (NITROSTAT) SL tablet 0.4 mg  Every 5 Minutes PRN         07/23/25 1254    07/23/25 1251  acetaminophen (TYLENOL) tablet 650 mg  Every 4 Hours PRN         07/23/25 1254    07/23/25 1251  HYDROcodone-acetaminophen (NORCO) 5-325 MG per tablet 1 tablet  Every 4 Hours PRN         07/23/25 1254    07/23/25 1251  temazepam (RESTORIL) capsule 15 mg  Nightly PRN         07/23/25 1254    07/23/25 1251  ondansetron ODT (ZOFRAN-ODT) disintegrating tablet 4 mg  Every 6 Hours PRN        Placed in \"Or\" Linked Group    07/23/25 1254    07/23/25 1251  ondansetron (ZOFRAN) injection 4 mg  Every 6 Hours PRN        Placed in \"Or\" Linked Group    07/23/25 1254    07/23/25 1217  Continuous Pulse Oximetry  Continuous,   Status:  Canceled         07/23/25 1216    07/23/25 1217  POC Glucose Once  Once,   Status:  Canceled        Comments: Post op glucose check on all diabetic patients...Notify Anesthesia if blood sugar is less than 80 mg/dL or greater than 220 mg/dL.      07/23/25 1216    07/23/25 1217  Vital signs every 5 minutes for 15 minutes, every 15 minutes thereafter.  Once,   Status:  Canceled         07/23/25 1216    07/23/25 1217  Call Anesthesiologist for additional IV Fluid bolus for Hypotension/Tachycardia  Until Discontinued,   Status:  Canceled         07/23/25 1216    07/23/25 1217  Notify Anesthesia of Any Acute Changes in Patient Condition  Until Discontinued,   Status:  Canceled         07/23/25 1216    07/23/25 1217  Notify Anesthesia for Unrelieved Pain  Until Discontinued,   Status:  Canceled         07/23/25 1216    07/23/25 1217  Once DC criteria to floor met, follow surgeon's orders.  Until Discontinued,   Status:  " "Canceled         07/23/25 1216 07/23/25 1217  Discharge patient from PACU when discharge criteria is met.  Until Discontinued,   Status:  Canceled         07/23/25 1216    07/23/25 1216  Oxygen Therapy- Nasal Cannula; Titrate 1-6 LPM Per SpO2; 90 - 95%  Continuous PRN,   Status:  Canceled       07/23/25 1216    07/23/25 1216  HYDROcodone-acetaminophen (NORCO) 5-325 MG per tablet 1 tablet  Once As Needed,   Status:  Discontinued         07/23/25 1216 07/23/25 1216  HYDROmorphone (DILAUDID) injection 0.5 mg  Every 5 Minutes PRN,   Status:  Discontinued         07/23/25 1216 07/23/25 1216  oxyCODONE-acetaminophen (PERCOCET) 7.5-325 MG per tablet 1 tablet  Every 4 Hours PRN,   Status:  Discontinued         07/23/25 1216 07/23/25 1216  fentaNYL citrate (PF) (SUBLIMAZE) injection 50 mcg  Every 5 Minutes PRN,   Status:  Discontinued         07/23/25 1216 07/23/25 1216  naloxone (NARCAN) injection 0.2 mg  As Needed,   Status:  Discontinued         07/23/25 1216 07/23/25 1216  flumazenil (ROMAZICON) injection 0.2 mg  As Needed,   Status:  Discontinued         07/23/25 1216 07/23/25 1216  ondansetron (ZOFRAN) injection 4 mg  Once As Needed,   Status:  Discontinued         07/23/25 1216 07/23/25 1216  droperidol (INAPSINE) injection 0.625 mg  Every 20 Minutes PRN,   Status:  Discontinued        Placed in \"Or\" Linked Group    07/23/25 1216 07/23/25 1216  droperidol (INAPSINE) injection 0.625 mg  Every 20 Minutes PRN,   Status:  Discontinued        Placed in \"Or\" Linked Group    07/23/25 1216 07/23/25 1216  promethazine (PHENERGAN) suppository 25 mg  Once As Needed,   Status:  Discontinued        Placed in \"Or\" Linked Group    07/23/25 1216    07/23/25 1216  promethazine (PHENERGAN) tablet 25 mg  Once As Needed,   Status:  Discontinued        Placed in \"Or\" Linked Group    07/23/25 1216    07/23/25 1216  labetalol (NORMODYNE,TRANDATE) injection 5 mg  Every 5 Minutes PRN,   Status:  Discontinued      "    07/23/25 1216    07/23/25 1216  hydrALAZINE (APRESOLINE) injection 5 mg  Every 10 Minutes PRN,   Status:  Discontinued         07/23/25 1216    07/23/25 1216  ePHEDrine injection 5 mg  Once As Needed,   Status:  Discontinued         07/23/25 1216    07/23/25 1216  Atropine Sulfate (PF) injection 0.4 mg  Once As Needed,   Status:  Discontinued         07/23/25 1216    07/23/25 1216  diphenhydrAMINE (BENADRYL) injection 12.5 mg  Every 15 Minutes PRN,   Status:  Discontinued         07/23/25 1216    07/23/25 1216  ipratropium-albuterol (DUO-NEB) nebulizer solution 3 mL  Once As Needed,   Status:  Discontinued         07/23/25 1216    07/23/25 1200  iopamidol (ISOVUE-370) 76 % injection  Code / Trauma / Sedation Medication,   Status:  Discontinued         07/23/25 1200    07/23/25 1156  Lidocaine-EPINEPHrine (PF) (XYLOCAINE W/EPI) 2 %-1:[id number] injection  Code / Trauma / Sedation Medication,   Status:  Discontinued         07/23/25 1156    07/23/25 1112  lidocaine (XYLOCAINE) 2% injection  Code / Trauma / Sedation Medication,   Status:  Discontinued         07/23/25 1112    07/23/25 1000  sodium chloride 0.9 % flush 3 mL  Every 12 Hours Scheduled,   Status:  Discontinued         07/23/25 0907    07/23/25 1000  lactated ringers infusion  Continuous         07/23/25 0907    07/23/25 1000  famotidine (PEPCID) injection 20 mg  Once         07/23/25 0907    07/23/25 0907  Obtain Informed Consent  Once,   Status:  Canceled         07/23/25 0906    07/23/25 0907  Clip / Prep Patient Prior to Procedure  Per Order Details,   Status:  Canceled        Comments: Clip / Prep Groin, Chest & Back in Pre-Op Area    07/23/25 0906    07/23/25 0907  Confirm NPO Status  Once,   Status:  Canceled         07/23/25 0906    07/23/25 0907  Insert Peripheral IV  Once,   Status:  Canceled         07/23/25 0906    07/23/25 0907  Saline Lock & Maintain IV Access  Continuous,   Status:  Canceled         07/23/25 0906    07/23/25 0907  Vital Signs  - Per Anesthesia Protocol  As Needed,   Status:  Canceled       07/23/25 0907    07/23/25 0907  Oxygen Therapy- Nasal Cannula; Titrate 1-6 LPM Per SpO2; 90 - 95%  Continuous PRN,   Status:  Canceled       07/23/25 0907    07/23/25 0907  Continuous Pulse Oximetry  Continuous,   Status:  Canceled         07/23/25 0907    07/23/25 0907  POC Glucose PRN  As Needed,   Status:  Canceled      Comments: For all diabetic patients. Notify Anesthesiologist for blood sugar > 180.      07/23/25 0907    07/23/25 0907  Insert Peripheral IV  Once,   Status:  Canceled         07/23/25 0907    07/23/25 0907  Saline Lock & Maintain IV Access  Continuous,   Status:  Canceled         07/23/25 0907    07/23/25 0907  sodium chloride 0.9 % flush 3-10 mL  As Needed,   Status:  Discontinued         07/23/25 0907    07/23/25 0907  lidocaine (XYLOCAINE) 1 % injection 0.5 mL  Once As Needed,   Status:  Discontinued         07/23/25 0907    07/23/25 0907  fentaNYL citrate (PF) (SUBLIMAZE) injection 50 mcg  Once As Needed,   Status:  Discontinued         07/23/25 0907    07/23/25 0907  midazolam (VERSED) injection 1 mg  Every 5 Minutes PRN,   Status:  Discontinued         07/23/25 0907    07/23/25 0907  May take Beta Blocker from home with sip of water.  Once,   Status:  Canceled        Comments: Only applicable to patients on home Beta Blocker    07/23/25 0907    07/23/25 0906  Oxygen Therapy- Nasal Cannula; Titrate 1-6 LPM Per SpO2; 90 - 95%  Continuous PRN,   Status:  Canceled       07/23/25 0906    07/11/25 0609  EP/CRM Study  Once         07/11/25 0608    Unscheduled  Vital Signs  As Needed       07/23/25 1254    Unscheduled  Change site dressing  As Needed       07/23/25 1254    Unscheduled  Head of Bed: Flat; 2 Hours; Elevate Head of Bed: 30 Degrees; 1 Hour; Keep Affected Extremity/ Extremities Straight; Total Bedrest Time? Other; Length of Time: 3 hours  As Needed       07/23/25 1254                                Discharge Summary     "    Saúl Be MD at 07/24/25 0849            Date of Discharge:  7/24/2025  Date of Admit: 7/23/2025    Discharge Diagnosis:  Nonvalvular atrial fibrillation   Recurrent syncope and falls  History of CVA  History of alcohol abuse  Hyperlipidemia  Peptic ulcer disease    Hospital Course: 60-year-old male with a medical history of nonvalvular atrial fibrillation, recurrent syncope and falls, history of CVA, hyperlipidemia, and peptic ulcer disease  He was evaluated by myself as an outpatient and after discussing the risks and benefits and discussion of the procedure with the patient he was interested in proceeding forward with watchman implantation to help limit his anticoagulation exposure due to high risk of bleeding.  Shared decision was had with his primary cardiologist.  Carries a [id number] score of 3 and a has bled score of 2    Underwent successful Watchman Flx LAAO with a 27mm device. No residual leak or complications. Mild sinus bradycardia and Coreg was decreased to 3.125mg bid.     Procedures Performed  Procedure(s):  Atrial Appendage Occlusion via Watchman       Consults       Date and Time Order Name Status Description    6/24/2025 12:47 AM Inpatient Cardiology Consult Completed                 Discharge Physical Exam:  Temp:  [96.3 °F (35.7 °C)-98.4 °F (36.9 °C)] 98.4 °F (36.9 °C)  Heart Rate:  [40-63] 59  Resp:  [16] 16  BP: ()/() 138/99    Intake/Output Summary (Last 24 hours) at 7/24/2025 0851  Last data filed at 7/24/2025 0412  Gross per 24 hour   Intake 1580 ml   Output 675 ml   Net 905 ml     Flowsheet Rows      Flowsheet Row First Filed Value   Admission Height 188 cm (74\") Documented at 07/23/2025 0852   Admission Weight 99.8 kg (220 lb) Documented at 07/23/2025 0852            General Appearance:    Alert, cooperative, in no acute distress   Head:    Normocephalic, without obvious abnormality, atraumatic       Neck/Lymph   No adenopathy, supple, no thyromegaly, no " carotid bruit, no    JVD   Lungs:     Clear to auscultation bilaterally, no wheezes, rales, or     rhonchi    Cardiac:    Normal rate, regular rhythm, no murmur, no rub, no gallop   Chest Wall:    No abnormalities observed   GI:     Normal bowel sounds, soft, nontender, nondistended,            no rebound tenderness   Extremities:   No cyanosis, clubbing, or edema   Circulatory/Peripheral Vascular :   Pulses palpable and equal bilaterally   Integumentary:   No bleeding or rash. Normal temperature                  Discharge Medications     Your medication list        START taking these medications        Instructions Last Dose Given Next Dose Due   apixaban 5 MG tablet tablet  Commonly known as: ELIQUIS      Take 1 tablet by mouth 2 (Two) Times a Day.              CHANGE how you take these medications        Instructions Last Dose Given Next Dose Due   carvedilol 3.125 MG tablet  Commonly known as: COREG  What changed:   medication strength  how much to take      Take 1 tablet by mouth 2 (Two) Times a Day With Meals.              CONTINUE taking these medications        Instructions Last Dose Given Next Dose Due   aspirin 81 MG chewable tablet      Chew 1 tablet Daily.       atorvastatin 40 MG tablet  Commonly known as: LIPITOR      Take 0.5 tablets by mouth Every Night.       busPIRone 10 MG tablet  Commonly known as: BUSPAR      Take 1 tablet by mouth Every 8 (Eight) Hours.       folic acid 1 MG tablet  Commonly known as: FOLVITE      Take 1 tablet by mouth Daily.       lamoTRIgine 25 MG tablet  Commonly known as: LaMICtal      Take 1 tab by mouth daily x 2 weeks, if tolerated without discussed side effects increase to 1 tab by mouth twice daily thereafter.       lisinopril 20 MG tablet  Commonly known as: PRINIVIL,ZESTRIL      Take 1 tablet by mouth Daily.       pantoprazole 40 MG EC tablet  Commonly known as: PROTONIX      Take 1 tablet by mouth Every Morning Before Breakfast.       predniSONE 20 MG  tablet  Commonly known as: DELTASONE      Take 1 tablet by mouth Daily With Lunch for 10 days. Start tomorrow       thiamine 100 MG tablet  Commonly known as: VITAMIN B1      Take 1 tablet by mouth Daily.       vilazodone 40 MG tablet tablet  Commonly known as: Viibryd      Take 1 tablet by mouth Daily.       vitamin B-12 500 MCG tablet  Commonly known as: CYANOCOBALAMIN      Take 1 tablet by mouth Daily.              ASK your doctor about these medications        Instructions Last Dose Given Next Dose Due   valACYclovir 1000 MG tablet  Commonly known as: VALTREX  Ask about: Should I take this medication?      Take 1 tablet by mouth 3 (Three) Times a Day With Meals for 7 days.                 Where to Get Your Medications        These medications were sent to [place] [id number]      Hours: Monday to Friday 7 AM to 6 PM, Saturday & Sunday 8 AM to 4:30 PM (Closed 12 PM to 12:30 PM) Phone: [phone]   apixaban 5 MG tablet tablet  atorvastatin 40 MG tablet  carvedilol 3.125 MG tablet              Discharge Diet: Cardiac, carb controlled    Activity at Discharge: No lifting greater than 10lb for 1 week    Discharge disposition: home    Condition on Discharge: stable    Follow-up Appointments  Future Appointments   Date Time Provider Department Center   7/31/2025  7:20 AM Anna Anand APRN [facility]  [facility]   8/1/2025 11:00 AM Dharmesh Mcmahon APRN [facility]   9/4/2025  2:00 PM Lesa Díaz LCSW [facility]  [facility]   9/5/2025  9:45 AM Chinmay Taylor MD [facility]   9/18/2025  9:00 AM Akash Rodriguez Jr., DO [facility] EPT [facility]   12/19/2025  9:45 AM Chinmay Taylor MD [facility]60 [facility]         Test Results Pending at Discharge       Saúl Be MD  07/24/25  08:49 EDT    Greater than 30 min spent in reviewing records, discussion and examination of the patient and discussion with other members of the patient's medical team.      Dictated utilizing Dragon dictation     Electronically signed by Saúl Byrne MD at 25 1002       Discharge Order (From admission, onward)       Start     Ordered    25 1002  Discharge patient  Once        Expected Discharge Date: 25   Discharge Disposition: Home or Self Care   Physician of Record for Attribution - Please select from Treatment Team: SAÚL BYRNE [[id number]]   Review needed by CMO to determine Physician of Record: No      Question Answer Comment   Physician of Record for Attribution - Please select from Treatment Team SAÚL BYRNE    Review needed by CMO to determine Physician of Record No        25 1002                                       [facility] CATH LAB  [address] [id number] [phone]          [facility] CATH LAB  [address] [id number] [phone]           Patient Information    Patient Name  Flako Coreas Sr. MRN  [id number] Legal Sex  Male  (Age)  1965 (60 y.o.)       EP/CRM Study    Patient Name: Flako Coreas Sr.   Patient MRN: [id number]   Patient : 1965 (60 y.o.)   Legal Sex: Male    Accession Number: [id number]   Date of Study: 25   Ordering Provider: Saúl Byrne MD    Clinical Indications: PAF (paroxysmal atrial fibrillation) [I48.0 (ICD-10-CM)], Recurrent syncope [R55 (ICD-10-CM)], Recurrent falls [R29.6 (ICD-10-CM)]       Performing Physician  Performing Staff   Primary: Saúl Byrne MD     Invasive Nurse: Conchita Bocanegra RN    Invasive Nurse: Mercedes Kebede RN    Scrub Person: Joce Mari    Invasive Nurse: Ruby Mahajan RN    Invasive Technologist: Hilary Strauss            Anesthesiology Staff    Anesthesiologist: Ector Alvarez MD           Procedures    Atrial Appendage Occlusion via Watchman          Patient Hx Of Height, Weight, and Vitals    Height Weight BSA (Calculated - sq m) BMI  (Calculated) Retired BMI (kg/m2) Pulse BP                Indications    PAF (paroxysmal atrial fibrillation) [I48.0 (ICD-10-CM)]   Recurrent syncope [R55 (ICD-10-CM)]   Recurrent falls [R29.6 (ICD-10-CM)]           Conclusion    [facility]   TRANSCATHETER LEFT ATRIAL APPENDAGE OCCLUSION (WATCHMAN) OPERATIVE REPORT     Patient: Flako Coreas Sr.   : 1965   MRN: [id number]      Procedure Date:  25      Referring Physician:   Chinmay Taylor MD     Interventional Cardiologist ():   Saúl Be MD, [facility], [facility]        Clinical Presentation:   60-year-old male with a medical history of nonvalvular atrial fibrillation, recurrent syncope and falls, history of CVA, hyperlipidemia, and peptic ulcer disease  He was evaluated by myself as an outpatient and after discussing the risks and benefits and discussion of the procedure with the patient he was interested in proceeding forward with watchman implantation to help limit his anticoagulation exposure due to high risk of bleeding.  Shared decision was had with his primary cardiologist.  Carries a [id number] score of 3 and a has bled score of 2       Preoperative Diagnoses:  Nonvalvular atrial fibrillation   Recurrent syncope and falls  History of CVA  History of alcohol abuse  Hyperlipidemia  Peptic ulcer disease    Postoperative Diagnoses:  Nonvalvular atrial fibrillation   Recurrent syncope and falls  History of CVA  History of alcohol abuse  Hyperlipidemia  Peptic ulcer disease    Procedures Performed:  Right common femoral venotomy utilizing micropuncture kit and ultrasound guidance.  Transseptal puncture of inter-atrial septum under fluoroscopic and transesophageal guidance   Successful placement of a 27 mm watchman Flx Pro left atrial appendage closure device using a Double curve 16 [place] sheath.  Percutaneous closure of right femoral venotomy access with Perclose figure of 8 suture.    Findings:  Transesophageal  imaging preimplantation demonstrated a maximum ostial diameter of 20 mm  Invasive pressures demonstrated a mean left atrial pressure of   Successful placement of 27  mm Watchman Flx Pro left atrial appendage closure device with no residual leak.  No shoulder.  3 device noted to have 20% compression. No device related thrombus at the end of the procedure.       Recommendations:  The patient will be transferred to the postprocedure monitor area and then transferred to our stepdown unit for monitoring overnight.  Plan for limited echocardiogram in a.m. to assess for any pericardial effusion.  Patient will restart anticoagulation tonight and will continue anticoagulation with aspirin until his 45-day transesophageal echocardiogram to confirm no leak around the device or clot formation at which time we could discontinue anticoagulation and transition over to dual antiplatelet therapy      Procedure Details:   The patient was brought to the cardiac Cath Lab where the patient was placed on the operating room table in a supine position. The patient was sedated by our anesthesiologist, please see their documentation regarding medications and monitoring. After the patient was appropriately sedated, antibiotics were given and the patient was intubated.  She was then prepped and draped in a standard sterile fashion. A timeout was performed to confirm the correct patient, procedure, and site.      We began the case by administering lidocaine to the right groin and access was obtained the right common femoral vein with a micropuncture needle and ultrasound guidance.  We then dilated up to place an Perclose to preclosed our artery and then over an Amplatzer Super Stiff guidewire we placed a 16 [place] sheath.  We then advanced our [place] Plane wire and our [place] Plane crossing system.  Using transesophageal guidance and fluoroscopy we were able to find an optimal position on the septum to puncture in order to be as coaxial with the left  atrial appendage as we could be.  We then crossed with our Howard crossing wire to then advance our Howard dilator and watchman sheath as a unit.  Once our sheath was across the septum we then advanced an angled pigtail catheter and advanced this out into the left atrial appendage.  Once we are in position we performed selective angiography of the left atrial appendage through our pigtail catheter.  We then advanced our delivery sheath out into the left atrial appendage over our angled pigtail catheter.       Once we are in position we then removed our angled pigtail catheter and advanced the Double curve watchman delivery system after it was prepped and de-aired.  The 27 mm Watchman flx Pro device was then advanced into the left atrial appendage.     Once we felt that we had good positioning of our Watchman device and had stability.  A tug test was performed to ensure proper stability of our device.  The initial device on tug test came back more proximally.  The device was then recaptured once.  Guide was slightly advanced and countered.  The device was then again deployed with a much better position.  Tug test was performed with no evidence of movement.  We also took selective angiography through our sheath to make sure watchman was appropriately positioned and well sealed around the appendage.  Further assessment with transesophageal echocardiogram was performed to look for any particular leaks, pericardial effusion, and to further assess for compression and position.  Once we are satisfied with our device and went through our PA SS criteria we then deployed our device removed our delivery wire and pulled our delivery sheath across the septum.  We then removed our delivery sheath through our 16 [place] sheath.  Our 16 [place] sheath was removed over a guidewire and hemostasis achieved with deployment with our Perclose.  We then placed a figure-of-eight suture for optimal hemostasis and applied lidocaine with  epinephrine to further assist with optimal hemostasis.  He was transferred to our post procedure area for recovery from anesthesia      Complications:  None.    Estimate Blood Loss:  20 milliliters.    Specimens Removed:  Not applicable.                    Radiation Dose Tracking    Panel Cumulative Air Kerma (mGy) Fluoro Time (min) DAP (mGy-cm2) Physician   Panel 1 [phone] 14.0 [phone] Saúl Be MD       Medications  As of 07/23/25 1213  (Filter): BH CV CONTRAST GROUPER Medications Shown  None                                   Implants    Implant      Dev Cls [place] Watchman/Flx/Pro 27mm - [id number] - Implanted    Inventory item: DEV CLS VALENCIA WATCHMAN/FLX/PRO 27MM Model/Cat number: [id number]   : [employer] Lot number: [id number]     As of 7/23/2025    Status: Implanted       Cp Watchman Flx Pro Proc - [id number] - Implanted    Inventory item: CP WATCHMAN FLX PRO PROC Model/Cat number: [id number]   : Sigasi       As of 7/23/2025    Status: Implanted       Cp Sys Watchman Fxd Curve Proc - [id number] - Implanted    Inventory item: CP SYS WATCHMAN FXD CURVE PROC Model/Cat number: [id number]   : Sigasi       As of 7/23/2025    Status: Implanted               Signed    Electronically signed by Saúl Be MD on 7/23/25 at 1334 EDT         Link to Procedure Log    Procedure Log       Printable Result Report    Result Report      Encounter    View Encounter               Results Routing Tracking    View Results Routing Information       Order Report     Order Details

## 2025-07-24 NOTE — DISCHARGE SUMMARY
"  Date of Discharge:  7/24/2025  Date of Admit: 7/23/2025    Discharge Diagnosis:  Nonvalvular atrial fibrillation   Recurrent syncope and falls  History of CVA  History of alcohol abuse  Hyperlipidemia  Peptic ulcer disease    Hospital Course: 60-year-old male with a medical history of nonvalvular atrial fibrillation, recurrent syncope and falls, history of CVA, hyperlipidemia, and peptic ulcer disease  He was evaluated by myself as an outpatient and after discussing the risks and benefits and discussion of the procedure with the patient he was interested in proceeding forward with watchman implantation to help limit his anticoagulation exposure due to high risk of bleeding.  Shared decision was had with his primary cardiologist.  Carries a [id number] score of 3 and a has bled score of 2    Underwent successful Watchman Flx LAAO with a 27mm device. No residual leak or complications. Mild sinus bradycardia and Coreg was decreased to 3.125mg bid.     Procedures Performed  Procedure(s):  Atrial Appendage Occlusion via Watchman       Consults       Date and Time Order Name Status Description    6/24/2025 12:47 AM Inpatient Cardiology Consult Completed                 Discharge Physical Exam:  Temp:  [96.3 °F (35.7 °C)-98.4 °F (36.9 °C)] 98.4 °F (36.9 °C)  Heart Rate:  [40-63] 59  Resp:  [16] 16  BP: ()/() 138/99    Intake/Output Summary (Last 24 hours) at 7/24/2025 0851  Last data filed at 7/24/2025 0412  Gross per 24 hour   Intake 1580 ml   Output 675 ml   Net 905 ml     Flowsheet Rows      Flowsheet Row First Filed Value   Admission Height 188 cm (74\") Documented at 07/23/2025 0852   Admission Weight 99.8 kg (220 lb) Documented at 07/23/2025 0852            General Appearance:    Alert, cooperative, in no acute distress   Head:    Normocephalic, without obvious abnormality, atraumatic       Neck/Lymph   No adenopathy, supple, no thyromegaly, no carotid bruit, no    JVD   Lungs:     Clear to auscultation " bilaterally, no wheezes, rales, or     rhonchi    Cardiac:    Normal rate, regular rhythm, no murmur, no rub, no gallop   Chest Wall:    No abnormalities observed   GI:     Normal bowel sounds, soft, nontender, nondistended,            no rebound tenderness   Extremities:   No cyanosis, clubbing, or edema   Circulatory/Peripheral Vascular :   Pulses palpable and equal bilaterally   Integumentary:   No bleeding or rash. Normal temperature                  Discharge Medications     Your medication list        START taking these medications        Instructions Last Dose Given Next Dose Due   apixaban 5 MG tablet tablet  Commonly known as: ELIQUIS      Take 1 tablet by mouth 2 (Two) Times a Day.              CHANGE how you take these medications        Instructions Last Dose Given Next Dose Due   carvedilol 3.125 MG tablet  Commonly known as: COREG  What changed:   medication strength  how much to take      Take 1 tablet by mouth 2 (Two) Times a Day With Meals.              CONTINUE taking these medications        Instructions Last Dose Given Next Dose Due   aspirin 81 MG chewable tablet      Chew 1 tablet Daily.       atorvastatin 40 MG tablet  Commonly known as: LIPITOR      Take 0.5 tablets by mouth Every Night.       busPIRone 10 MG tablet  Commonly known as: BUSPAR      Take 1 tablet by mouth Every 8 (Eight) Hours.       folic acid 1 MG tablet  Commonly known as: FOLVITE      Take 1 tablet by mouth Daily.       lamoTRIgine 25 MG tablet  Commonly known as: LaMICtal      Take 1 tab by mouth daily x 2 weeks, if tolerated without discussed side effects increase to 1 tab by mouth twice daily thereafter.       lisinopril 20 MG tablet  Commonly known as: PRINIVIL,ZESTRIL      Take 1 tablet by mouth Daily.       pantoprazole 40 MG EC tablet  Commonly known as: PROTONIX      Take 1 tablet by mouth Every Morning Before Breakfast.       predniSONE 20 MG tablet  Commonly known as: DELTASONE      Take 1 tablet by mouth Daily  With Lunch for 10 days. Start tomorrow       thiamine 100 MG tablet  Commonly known as: VITAMIN B1      Take 1 tablet by mouth Daily.       vilazodone 40 MG tablet tablet  Commonly known as: Viibryd      Take 1 tablet by mouth Daily.       vitamin B-12 500 MCG tablet  Commonly known as: CYANOCOBALAMIN      Take 1 tablet by mouth Daily.              ASK your doctor about these medications        Instructions Last Dose Given Next Dose Due   valACYclovir 1000 MG tablet  Commonly known as: VALTREX  Ask about: Should I take this medication?      Take 1 tablet by mouth 3 (Three) Times a Day With Meals for 7 days.                 Where to Get Your Medications        These medications were sent to [place] [id number]      Hours: Monday to Friday 7 AM to 6 PM, Saturday & Sunday 8 AM to 4:30 PM (Closed 12 PM to 12:30 PM) Phone: [phone]   apixaban 5 MG tablet tablet  atorvastatin 40 MG tablet  carvedilol 3.125 MG tablet              Discharge Diet: Cardiac, carb controlled    Activity at Discharge: No lifting greater than 10lb for 1 week    Discharge disposition: home    Condition on Discharge: stable    Follow-up Appointments  Future Appointments   Date Time Provider Department Center   7/31/2025  7:20 AM Anna Anand APRN [facility]   8/1/2025 11:00 AM Dharmesh Mcmahon APRN [facility]   9/4/2025  2:00 PM Lesa Díaz LCSW [facility] COR   9/5/2025  9:45 AM Chinmay Taylor MD [facility] CD [facility]60 [facility]   9/18/2025  9:00 AM Akash Rodriguez Jr.,  [facility] EPT [facility]   12/19/2025  9:45 AM Chinmay Taylor MD [facility]         Test Results Pending at Discharge       Saúl Be MD  07/24/25  08:49 EDT    Greater than 30 min spent in reviewing records, discussion and examination of the patient and discussion with other members of the patient's medical team.     Dictated utilizing Dragon dictation

## 2025-07-24 NOTE — NURSING NOTE
I saw patient in his room.  Right groin is soft with clean/dry bandage in place. I removed the bandage and figure of 8 suture; site is without redness. Small oozing noted, gauze & band aid left on site. I advised him to monitor groin sites for any redness, drainage, pain or firmness; and to call with any concerns.  He's been up once for ambulation & notes lightheadedness.  We discussed follow up expectations after the Watchman procedure with follow up over the next year. Patient is awaiting TTE this morning. I left patient with call light within reach.   0830: Pt safely ambulated 2 laps around nursing unit without lightheadedness. Pt returned to room and sitting in chair with call light within reach. RTRN

## 2025-07-24 NOTE — CASE MANAGEMENT/SOCIAL WORK
Case Management Discharge Note      Final Note: Patient discharged home.  Pt reported he had no moneys to pay for his discharge medications.  [facility] indigent fund paid for Pt d/c medications totalling $37.00.  Cost given to [facility] by Bonnie LINN/RON........Helena HAYNES/SANYA         Selected Continued Care - Discharged on 7/24/2025 Admission date: 7/23/2025 - Discharge disposition: Home or Self Care      Destination    No services have been selected for the patient.                Durable Medical Equipment    No services have been selected for the patient.                Dialysis/Infusion    No services have been selected for the patient.                Home Medical Care    No services have been selected for the patient.                Therapy    No services have been selected for the patient.                Community Resources    No services have been selected for the patient.                Community & DME    No services have been selected for the patient.                    Transportation Services  Transportation: Private Transportation  Private: Car    Final Discharge Disposition Code: 01 - home or self-care

## 2025-07-24 NOTE — NURSING NOTE
M2B delivered. Patient taken with tech to exit with son and wheelchair. Patient has all belongings.

## 2025-07-24 NOTE — NURSING NOTE
Pt is alert, pt states that he is worried that his heart rate has been lower 50's, wants to talk to the cardiology today, his blood pressure elevated I called cardiology and talked with Viv AGUILA, and said to keep watching until morning and coreg was held yesterday because of his heart rate being lower 50's and higher 50's. Pt is concerned about this

## 2025-07-24 NOTE — OUTREACH NOTE
Prep Survey      Flowsheet Row Responses   [facility] patient discharged from? [place]   Is LACE score < 7 ? No   Eligibility [facility]   Date of Admission 07/23/25   Date of Discharge 07/24/25   Discharge diagnosis Atrial fibrillation   Does the patient have one of the following disease processes/diagnoses(primary or secondary)? Other   Prep survey completed? Yes            Rosalia RAPHAEL - Registered Nurse

## 2025-07-25 ENCOUNTER — TRANSITIONAL CARE MANAGEMENT TELEPHONE ENCOUNTER (OUTPATIENT)
Dept: CALL CENTER | Facility: HOSPITAL | Age: 60
End: 2025-07-25
Payer: COMMERCIAL

## 2025-07-25 NOTE — OUTREACH NOTE
Call Center TCM Note      Flowsheet Row Responses   [facility] patient discharged from? [place]   Does the patient have one of the following disease processes/diagnoses(primary or secondary)? Other   TCM attempt successful? Yes   Call start time 1108   Call end time 1110   Discharge diagnosis Atrial fibrillation   Meds reviewed with patient/caregiver? Yes   Is the patient having any side effects they believe may be caused by any medication additions or changes? No   Does the patient have all medications ordered at discharge? Yes   Is the patient taking all medications as directed (includes completed medication regime)? Yes   Comments States he has f/u with cardiology on 8/1   Does the patient have an appointment with their PCP within 7-14 days of discharge? No   Nursing Interventions Patient desires to follow up with specialty only   Psychosocial issues? No   Did the patient receive a copy of their discharge instructions? Yes   Nursing interventions Reviewed instructions with patient   What is the patient's perception of their health status since discharge? Improving   Is the patient/caregiver able to teach back signs and symptoms related to disease process for when to call PCP? Yes   Is the patient/caregiver able to teach back signs and symptoms related to disease process for when to call 911? Yes   Is the patient/caregiver able to teach back the hierarchy of who to call/visit for symptoms/problems? PCP, Specialist, Home health nurse, Urgent Care, ED, 911 Yes   If the patient is a current smoker, are they able to teach back resources for cessation? Not a smoker   Additional teach back comments States he is doing well.  HR rate is lower than his norm around 51 but feeling good.   TCM call completed? Yes   Wrap up additional comments No questions or needs at this time.   Call end time 1110            Elizabeth LYNCH - Licensed Nurse    7/25/2025, 11:11 EDT

## 2025-07-31 ENCOUNTER — TELEMEDICINE (OUTPATIENT)
Dept: PSYCHIATRY | Facility: CLINIC | Age: 60
End: 2025-07-31
Payer: COMMERCIAL

## 2025-07-31 DIAGNOSIS — F33.2 SEVERE EPISODE OF RECURRENT MAJOR DEPRESSIVE DISORDER, WITHOUT PSYCHOTIC FEATURES: Primary | ICD-10-CM

## 2025-07-31 DIAGNOSIS — G47.9 SLEEP DISTURBANCES: ICD-10-CM

## 2025-07-31 DIAGNOSIS — F41.0 PANIC ANXIETY SYNDROME: ICD-10-CM

## 2025-07-31 DIAGNOSIS — Z79.899 MEDICATION MANAGEMENT: ICD-10-CM

## 2025-07-31 RX ORDER — BUSPIRONE HYDROCHLORIDE 10 MG/1
TABLET ORAL
Qty: 90 TABLET | Refills: 1 | Status: SHIPPED | OUTPATIENT
Start: 2025-07-31

## 2025-07-31 RX ORDER — LAMOTRIGINE 25 MG/1
TABLET ORAL
Qty: 120 TABLET | Refills: 1 | Status: SHIPPED | OUTPATIENT
Start: 2025-07-31

## 2025-07-31 NOTE — PROGRESS NOTES
This provider is located at the [facility] (through [facility]), [address], [facility], [address] using a secure MyChart Video Visit through [employer]. Patient is being seen remotely via telehealth at their home address in [place], and stated they are in a secure environment for this session. The patient's condition being diagnosed/treated is appropriate for telemedicine. The provider identified herself as well as her credentials.   The patient, and/or patients guardian, consent to be seen remotely, and when consent is given they understand that the consent allows for patient identifiable information to be sent to a third party as needed.   They may refuse to be seen remotely at any time. The electronic data is encrypted and password protected, and the patient and/or guardian has been advised of the potential risks to privacy not withstanding such measures.    You have chosen to receive care through a telehealth visit.  Do you consent to use a video/audio connection for your medical care today? Yes        Patient identifiers utilized: Name and date of birth.    Patient verbally confirmed consent for today's encounter:  July 31, 2025    Sea Coreas Sr. is a 60 y.o. male who presents today for follow up    Chief Complaint:    Chief Complaint   Patient presents with    Anxiety    Depression    Med Management        Referring Provider: Trell Murphy MD    History of Present Illness:    History of Present Illness  The patient presents for evaluation of depression and anxiety.    He continues his Lamictal regimen, which he tolerates well without any side effects such as nausea or bruising. He reports no missed doses of Viibryd. He rates his depression at 8 out of 10, attributing it to his reluctance to leave the house. He also rates his anxiety at 8 out of 10, particularly heightened due to his recent surgery. His feelings of hopelessness are rated at 6 out  of 10. Over the past two weeks, he has experienced a lack of interest or pleasure in activities nearly every day, felt down, depressed, or hopeless nearly every day, had difficulty staying asleep most nights, felt tired or had little energy, and felt bad about himself nearly every day. He struggles with concentration daily and tends to speak softly when around others. He reports no thoughts of self-harm or suicide. He feels nervous, anxious, or on edge nearly every day, unable to stop or control worry nearly every day, worries too much about different things nearly every day, has trouble relaxing every day, rarely feels restless, becomes easily annoyed or irritable about half the time, and feels afraid as if something awful might happen every day. He averages 4.5 hours of sleep per night, tracked on his Apple watch, and is working on improving this. He does not use a CPAP machine out of financial concerns.  Indicates some restless legs at night. He reports no hallucinations or thoughts of harming others. He has abstained from alcohol since 02/2025 and last used marijuana a month ago. He has signed up for therapy and is awaiting his first session.    Interim History: He reports a hectic month, including heart surgery and shingles. Despite these challenges, he feels he has not regressed in his progress. He experienced a fainting episode on Tuesday, during which he lay on the kitchen floor for 15 minutes. His blood pressure was recorded as 91/64 following this episode but later increased. He has a scheduled appointment with his cardiologist tomorrow morning at 11:00 AM. He has been taking it easy since then.  He often experiences dizziness before fainting but sometimes faints without warning, as in the case of the heart pause. He has had falls due to dizziness every couple of months. He has not taken BuSpar for about a month but notes that it usually helps calm him down. He does not believe BuSpar is related to his  dizziness or fainting spells, which have been ongoing for two years.  States rash is improving, unilateral.  Shown to this clinician at this time and is scabbing.    Social History:  - Signed up for therapy, awaiting first session    Substance Use:  - Abstained from alcohol since 02/2025  - Last used marijuana a month ago    Pertinent Negatives:  - Reports no thoughts of self-harm or suicide  - Reports no hallucinations or thoughts of harming others    Results:  - Depression score: 8/10  - Anxiety score: 8/10  - Hopelessness score: 6/10    He underwent a left atrial appendage occlusion on 07/23/2025, which required an overnight hospital stay. Post-surgery, he experienced a heart pause, low blood pressure, and a pulse rate dropping to 40-42. He had a fainting episode on Tuesday, during which he lay on the kitchen floor for 15 minutes. His blood pressure was recorded as 91/64 following this episode but later increased. He has a scheduled appointment with his cardiologist tomorrow morning at 11:00 AM. He has been taking it easy since then. He has a monitor in his chest and plans to contact his cardiologist. He often experiences dizziness before fainting but sometimes faints without warning, as in the case of the heart pause. He has had falls due to dizziness every couple of months. He has not taken BuSpar for about a month but notes that it usually helps calm him down. He does not believe BuSpar is related to his dizziness or fainting spells, which have been ongoing for two years.    He had shingles, which caused a rash and pain. The rash was itchy and tingly, resembling a pulled muscle. The rash has mostly cleared up, leaving only remnants. This was his first experience with shingles.    SOCIAL HISTORY  He reports no alcohol use since February 2025 and no marijuana use in the past month.      Patient is , has 1 son living age 26.  Lives alone.  Mother unfortunately passed away in 2021 due to COVID.  He was her  "caregiver for approximately 3 years and acknowledges this as difficult.  Father passed away from a stroke in 2017.  Strained relationship with brother he is currently working through, 1 sister he does not have communication with.  Highest level of education is a bachelor's in criminal justice and a business minor.  Currently unemployed but previously worked as a  for [employer], [employer], and [employer].  States \"I want to get back to work and just concern I am not ready.\"  [employer] preference.  Has had 1 arrest due to an  tag and missing traffic school.  Regarding chemical dependency and substance abuse, patient states \"I have had bouts with alcohol, enjoying it too much, the same with marijuana.\"  Not currently drinking or smoking, last alcohol use .  Denies alternative circumstances of abuse or dependency.  Not currently in therapy but does express interest.  Enjoys the outdoors, modeling, and walking.    The following portions of the patient's history were reviewed and updated as appropriate: allergies, current medications, past family history, past medical history, past social history, past surgical history and problem list.    Past Psychiatric History:  Began Treatment:  Diagnoses:Depression and Anxiety  Psychiatrist:previously  Therapist:previously  Admission History:Denies  Medication Trials:buspar 10 TID (\"I think it helped a little but I don't know made me tired\"), remeron 30 (don't remember), elavil 25 (\"seemed to work very well but worried about addiction\"), klonopin .0125, lexapro (sexual side effects but seemed to work), atarax 25-50 (worked), vraylar 1.5 (pacing), clonidine (hypotension?)  Self Harm: Denies  Suicide Attempts:self interrupted via shooting   Psychosis, Anxiety, Depression: N/A    Past Medical History:  Past Medical History:   Diagnosis Date    ADHD (attention deficit hyperactivity disorder)     On going issue    Alcohol abuse  "    Anxiety     Lepe's esophagus     Benign prostatic hyperplasia Surgery in 12/2021    Brain concussion 11/2023    Clotting disorder Intestinal    Depression     Diverticulitis     Diverticulosis     Duodenitis     Enteritis     Failure to thrive (child)     Family history of blood clots     Fracture of wrist 1985    Fracture, foot 2019    GERD (gastroesophageal reflux disease)     Hyperlipidemia     Hypertension     Hypertensive emergency     Implantable loop recorder present 03/12/2025    Irritable bowel syndrome 2017    Low testosterone     Microcytosis     Pancreatitis     Peptic ulcer disease 02/23/2025    Pneumonia     PONV (postoperative nausea and vomiting)     Seasonal affective disorder     Shoulder injury     Stroke     Unexplained weight loss     Visual impairment 8/2023       Substance Abuse History:   Types:Denies all, including illicit  Withdrawal Symptoms:Denies  Longest Period Sober:Not Applicable   AA: Not applicable     Social History:  Social History     Socioeconomic History    Marital status:    Tobacco Use    Smoking status: Never     Passive exposure: Never    Smokeless tobacco: Never   Vaping Use    Vaping status: Never Used   Substance and Sexual Activity    Alcohol use: Not Currently     Comment: Hx Abuse    Drug use: Not Currently    Sexual activity: Defer     Partners: Female     Birth control/protection: Other, Birth control pill       Family History:  Family History   Problem Relation Age of Onset    Stroke Mother     Heart disease Mother     Stroke Father     Hyperlipidemia Father     Hypertension Father        Past Surgical History:  Past Surgical History:   Procedure Laterality Date    ABDOMINAL SURGERY      ATRIAL APPENDAGE EXCLUSION LEFT WITH TRANSESOPHAGEAL ECHOCARDIOGRAM N/A 7/23/2025    Procedure: Atrial Appendage Occlusion via Watchman;  Surgeon: Saúl Be MD;  Location: [facility] INVASIVE LOCATION;  Service: Cardiology;  Laterality: N/A;    CARDIAC  ELECTROPHYSIOLOGY PROCEDURE N/A 01/24/2024    Procedure: Loop insertion- Medtronic LINQ;  Surgeon: Kaela Walker MD;  Location: [facility] CATH INVASIVE LOCATION;  Service: Cardiovascular;  Laterality: N/A;    COLON SURGERY      COLONOSCOPY      COLONOSCOPY N/A 12/11/2022    Procedure: COLONOSCOPY INTO CECUM WITH HOT SNARE POLYPECTOMY;  Surgeon: Albert Gallo MD;  Location: [facility] ENDOSCOPY;  Service: Gastroenterology;  Laterality: N/A;  PRE- GI BLEED  POST- DIVERTICULOSIS, POLYP    COLONOSCOPY N/A 02/14/2024    Procedure: COLONOSCOPY into cecum/terminal ileum;  Surgeon: Albert Gallo MD;  Location: [facility] END[facility];  Service: Gastroenterology;  Laterality: N/A;  diverticulosis, hemorrhoids    ENDOSCOPY N/A 12/10/2022    Procedure: ESOPHAGOGASTRODUODENOSCOPY;  Surgeon: Albert Gallo MD;  Location: [facility] END[facility];  Service: Gastroenterology;  Laterality: N/A;  PRE- DARK STOOLS  POST- BARRETTS ESOPHAGUS    ENDOSCOPY N/A 02/14/2024    Procedure: ESOPHAGOGASTRODUODENOSCOPY with biopsy;  Surgeon: Albert Gallo MD;  Location: [facility] END[facility];  Service: Gastroenterology;  Laterality: N/A;  long segment wilson's, hiatal hernia    FRACTURE SURGERY Right 1980    for broken arm    FRACTURE SURGERY Right     for broken hand    GASTRECTOMY      HAND SURGERY  1990    INCISION AND DRAINAGE ABSCESS  2015    anal    JOINT REPLACEMENT  2022    Rebuild shoulder    PROSTATE SURGERY      SHOULDER SURGERY  2022    SKIN BIOPSY      SMALL INTESTINE SURGERY      TONSILLECTOMY      TRIGGER POINT INJECTION  2017       Problem List:  Patient Active Problem List   Diagnosis    Mixed anxiety depressive disorder    Hyperlipidemia    Essential hypertension    Diverticulosis    Nodule of spleen    Chronic bilateral lower abdominal pain    Wilson's esophagus    GERD (gastroesophageal reflux disease)    Vertigo    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    History of vertebral artery stenosis    Alcohol  abuse    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    Anxiety about health    Vertebral artery stenosis, left    Memory loss    History of alcohol dependence    Elevated lipoprotein(a)    Elevated coronary artery calcium score    PAF (paroxysmal atrial fibrillation)    Alcohol use    Mood disorder    Alcohol withdrawal    History of CVA (cerebrovascular accident)    Peptic ulcer disease    History of diverticulosis    Implantable loop recorder present    ALLISON (acute kidney injury)    Orthostatic hypotension    Syncope    Recurrent syncope    Recurrent falls    Atrial fibrillation       Allergy:   No Known Allergies     Current Medications:   Current Outpatient Medications   Medication Sig Dispense Refill    busPIRone (BUSPAR) 10 MG tablet Take 1 tab by mouth twice daily x 1 week, if tolerated increase to 1.5 tabs by mouth twice daily thereafter. 90 tablet 1    lamoTRIgine (LaMICtal) 25 MG tablet Take 2 tabs by mouth twice daily. 120 tablet 1    apixaban (ELIQUIS) 5 MG tablet tablet Take 1 tablet by mouth 2 (Two) Times a Day. 30 tablet 1    aspirin 81 MG chewable tablet Chew 1 tablet Daily.      atorvastatin (LIPITOR) 40 MG tablet Take 0.5 tablets by mouth Every Night. 45 tablet 3    carvedilol (COREG) 3.125 MG tablet Take 1 tablet by mouth 2 (Two) Times a Day With Meals. 60 tablet 3    folic acid (FOLVITE) 1 MG tablet Take 1 tablet by mouth Daily. 30 tablet 0    lisinopril (PRINIVIL,ZESTRIL) 20 MG tablet Take 1 tablet by mouth Daily. 90 tablet 3    pantoprazole (PROTONIX) 40 MG EC tablet Take 1 tablet by mouth Every Morning Before Breakfast. 30 tablet 0    thiamine (VITAMIN B1) 100 MG tablet Take 1 tablet by mouth Daily. 30 tablet 0    valACYclovir (VALTREX) 1000 MG tablet Take 1 tablet by mouth 3 (Three) Times a Day With Meals for 7 days. 21 tablet 0    vilazodone (Viibryd) 40 MG tablet tablet Take 1 tablet by mouth Daily. 30 tablet 6    vitamin B-12 (CYANOCOBALAMIN) 500 MCG tablet Take 1 tablet by mouth Daily. 90  "tablet 0     No current facility-administered medications for this visit.       Review of Systems:    Review of Systems   Constitutional: Negative.    HENT: Negative.     Eyes: Negative.    Respiratory: Negative.     Cardiovascular: Negative.         \"Heart pause 14 seconds last year\"   Gastrointestinal:  Positive for GERD.   Endocrine: Negative.    Genitourinary: Negative.    Musculoskeletal: Negative.    Skin: Negative.    Allergic/Immunologic: Negative.    Neurological: Negative.  Negative for seizures.   Hematological: Negative.    Psychiatric/Behavioral:  Positive for decreased concentration, dysphoric mood, depressed mood and stress. The patient is nervous/anxious.          Physical Exam:   Physical Exam  Constitutional:       Appearance: Normal appearance.   HENT:      Head: Normocephalic.      Nose: Nose normal.   Musculoskeletal:         General: Normal range of motion.      Cervical back: Normal range of motion.   Neurological:      Mental Status: He is alert.   Psychiatric:         Attention and Perception: Attention and perception normal.         Mood and Affect: Mood is anxious and depressed. Affect is flat.         Speech: Speech normal.         Behavior: Behavior normal. Behavior is cooperative.         Thought Content: Thought content normal.         Cognition and Memory: Cognition and memory normal.         Judgment: Judgment normal.         Vitals:  There were no vitals taken for this visit. There is no height or weight on file to calculate BMI.  Due to extenuating circumstances and possible current health risks associated with the patient being present in a clinical setting (with current health restrictions in place in regards to possible COVID 19 transmission/exposure), the patient was seen remotely today via a MyChart Video Visit through [facility].  Unable to obtain vital signs due to nature of remote visit.  Height stated at 6ft2 inches.  Weight stated at 215 pounds.    Last 3 Blood Pressure " Readings:  BP Readings from Last 3 Encounters:   07/24/25 146/92   07/16/25 143/98   07/10/25 118/80       PHQ-9 Score:   PHQ-9 Total Score:  PHQ-9 Depression Screening  Little interest or pleasure in doing things? Nearly every day   Feeling down, depressed, or hopeless? Nearly every day   PHQ-2 Total Score 6   Trouble falling or staying asleep, or sleeping too much? Nearly every day   Feeling tired or having little energy? Several days   Poor appetite or overeating? Not at all   Feeling bad about yourself - or that you are a failure or have let yourself or your family down? Nearly every day   Trouble concentrating on things, such as reading the newspaper or watching television? Nearly every day   Moving or speaking so slowly that other people could have noticed? Or the opposite - being so fidgety or restless that you have been moving around a lot more than usual? Not at all     Thoughts that you would be better off dead, or of hurting yourself in some way? Not at all   PHQ-9 Total Score 16   If you checked off any problems, how difficult have these problems made it for you to do your work, take care of things at home, or get along with other people? Very difficult           RIRI-7 Score:   Feeling nervous, anxious or on edge: Nearly every day  Not being able to stop or control worrying: Nearly every day  Worrying too much about different things: Nearly every day  Trouble Relaxing: Nearly every day  Being so restless that it is hard to sit still: Not at all  Feeling afraid as if something awful might happen: Nearly every day  Becoming easily annoyed or irritable: Several days  RIRI 7 Total Score: 16  If you checked any problems, how difficult have these problems made it for you to do your work, take care of things at home, or get along with other people: Extremely difficult     Mental Status Exam:   Hygiene:   good  Cooperation:  Guarded  Eye Contact:  Poor  Psychomotor Behavior:  Restless  Affect:  Blunted  Mood: sad  and anxious  Hopelessness: 6  Speech:  Normal  Thought Process:  Goal directed and Linear  Thought Content:  Normal and Mood congruent  Suicidal:  None  Homicidal:  None  Hallucinations:  Not demonstrated today  Delusion:  None  Memory:  Deficits  Orientation:  Grossly intact  Reliability:  fair  Insight:  Fair  Judgement:  Fair  Impulse Control:  Fair  Physical/Medical Issues:  stroke 2023, hypertension, syncope, memory loss, mild KRIS 2024.        Lab Results:   Admission on 07/23/2025, Discharged on 07/24/2025   Component Date Value Ref Range Status    Activated Clotting Time  07/23/2025 210 (H)  82 - 152 Seconds Final    Serial Number: [id number]:  [id number]    WBC 07/24/2025 11.51 (H)  3.40 - 10.80 10*3/mm3 Final    RBC 07/24/2025 4.60  4.14 - 5.80 10*6/mm3 Final    Hemoglobin 07/24/2025 13.3  13.0 - 17.7 g/dL Final    Hematocrit 07/24/2025 42.2  37.5 - 51.0 % Final    MCV 07/24/2025 91.7  79.0 - 97.0 fL Final    MCH 07/24/2025 28.9  26.6 - 33.0 pg Final    MCHC 07/24/2025 31.5  31.5 - 35.7 g/dL Final    RDW 07/24/2025 13.9  12.3 - 15.4 % Final    RDW-SD 07/24/2025 46.3  37.0 - 54.0 fl Final    MPV 07/24/2025 9.9  6.0 - 12.0 fL Final    Platelets 07/24/2025 211  140 - 450 10*3/mm3 Final    Glucose 07/24/2025 106 (H)  65 - 99 mg/dL Final    BUN 07/24/2025 18.0  6.0 - 20.0 mg/dL Final    Creatinine 07/24/2025 0.94  0.76 - 1.27 mg/dL Final    Sodium 07/24/2025 139  136 - 145 mmol/L Final    Potassium 07/24/2025 4.2  3.5 - 5.2 mmol/L Final    Slight hemolysis detected by analyzer. Result may be falsely elevated.    Chloride 07/24/2025 109 (H)  98 - 107 mmol/L Final    CO2 07/24/2025 18.8 (L)  22.0 - 29.0 mmol/L Final    Calcium 07/24/2025 8.3 (L)  8.6 - 10.5 mg/dL Final    BUN/Creatinine Ratio 07/24/2025 19.1  7.0 - 25.0 Final    Anion Gap 07/24/2025 11.2  5.0 - 15.0 mmol/L Final    eGFR 07/24/2025 94.0  >60.0 mL/min/1.73 Final   Lab on 07/15/2025   Component Date Value Ref Range Status    Protime 07/15/2025 12.5   11.7 - 14.2 Seconds Final    INR 07/15/2025 0.95  0.90 - 1.10 Final    WBC 07/15/2025 6.28  3.40 - 10.80 10*3/mm3 Final    RBC 07/15/2025 4.88  4.14 - 5.80 10*6/mm3 Final    Hemoglobin 07/15/2025 14.0  13.0 - 17.7 g/dL Final    Hematocrit 07/15/2025 42.8  37.5 - 51.0 % Final    MCV 07/15/2025 87.7  79.0 - 97.0 fL Final    MCH 07/15/2025 28.7  26.6 - 33.0 pg Final    MCHC 07/15/2025 32.7  31.5 - 35.7 g/dL Final    RDW 07/15/2025 13.5  12.3 - 15.4 % Final    RDW-SD 07/15/2025 42.7  37.0 - 54.0 fl Final    MPV 07/15/2025 9.6  6.0 - 12.0 fL Final    Platelets 07/15/2025 220  140 - 450 10*3/mm3 Final    Glucose 07/15/2025 98  65 - 99 mg/dL Final    BUN 07/15/2025 18.0  6.0 - 20.0 mg/dL Final    Creatinine 07/15/2025 0.98  0.76 - 1.27 mg/dL Final    Sodium 07/15/2025 144  136 - 145 mmol/L Final    Potassium 07/15/2025 5.0  3.5 - 5.2 mmol/L Final    Chloride 07/15/2025 109 (H)  98 - 107 mmol/L Final    CO2 07/15/2025 25.0  22.0 - 29.0 mmol/L Final    Calcium 07/15/2025 9.3  8.6 - 10.5 mg/dL Final    BUN/Creatinine Ratio 07/15/2025 18.4  7.0 - 25.0 Final    Anion Gap 07/15/2025 10.0  5.0 - 15.0 mmol/L Final    eGFR 07/15/2025 89.4  >60.0 mL/min/1.73 Final   Orders Only on 07/08/2025   Component Date Value Ref Range Status    Date Time Interrogation Session 07/08/2025 [id number]   Final    Type Interrogation Session 07/08/2025 Remote   Final    Implantable Pulse Generator Manufa* 07/08/2025 Medtronic   Final    Implantable Pulse Generator Type 07/08/2025 ILR   Final    Implantable Pulse Generator Model 07/08/2025 [id number]   Final    Implantable Pulse Generator Serial* 07/08/2025 [id number]   Final    Implantable Pulse Generator Implan* 07/08/2025 [id number]   Final    Battery Status 07/08/2025 OK   Final    Zone Setting Type Category 07/08/2025 Pa   Final    IDC RATE 1 07/08/2025 30   Final    Zone Setting Detection Details 07/08/2025 4   Final    Zone Setting Status 07/08/2025 On   Final    Zone ID 07/08/2025 1   Final     Zone Setting Type Category 07/08/2025 Pause   Final    IDC RATE 1 07/08/2025 3   Final    Zone Setting Status 07/08/2025 On   Final    Zone ID 07/08/2025 2   Final    Zone Setting Type Category 07/08/2025 AF   Final    Zone Setting Status 07/08/2025 On   Final    Zone ID 07/08/2025 3   Final    Zone Setting Type Category 07/08/2025 AT   Final    Zone Setting Status 07/08/2025 Off   Final    Zone ID 07/08/2025 4   Final    Zone Setting Type Category 07/08/2025 Tachy   Final    IDC RATE 1 07/08/2025 176   Final    Zone Setting Detection Details 07/08/2025 16   Final    Zone Setting Status 07/08/2025 On   Final    Zone ID 07/08/2025 7   Final   Orders Only on 07/06/2025   Component Date Value Ref Range Status    Date Time Interrogation Session 07/06/2025 [id number]   Final    Type Interrogation Session 07/06/2025 Remote   Final    Implantable Pulse Generator Manufa* 07/06/2025 Medtronic   Final    Implantable Pulse Generator Type 07/06/2025 ILR   Final    Implantable Pulse Generator Model 07/06/2025 [id number]   Final    Implantable Pulse Generator Serial* 07/06/2025 [id number]   Final    Implantable Pulse Generator Implan* 07/06/2025 [id number]   Final   Admission on 06/23/2025, Discharged on 06/26/2025   Component Date Value Ref Range Status    Glucose 06/23/2025 135 (H)  65 - 99 mg/dL Final    BUN 06/23/2025 67.0 (H)  6.0 - 20.0 mg/dL Final    Creatinine 06/23/2025 2.17 (H)  0.76 - 1.27 mg/dL Final    Sodium 06/23/2025 136  136 - 145 mmol/L Final    Potassium 06/23/2025 4.6  3.5 - 5.2 mmol/L Final    Chloride 06/23/2025 100  98 - 107 mmol/L Final    CO2 06/23/2025 20.9 (L)  22.0 - 29.0 mmol/L Final    Calcium 06/23/2025 9.5  8.6 - 10.5 mg/dL Final    Total Protein 06/23/2025 7.4  6.0 - 8.5 g/dL Final    Albumin 06/23/2025 4.3  3.5 - 5.2 g/dL Final    ALT (SGPT) 06/23/2025 26  1 - 41 U/L Final    AST (SGOT) 06/23/2025 18  1 - 40 U/L Final    Alkaline Phosphatase 06/23/2025 72  39 - 117 U/L Final    Total Bilirubin  06/23/2025 0.4  0.0 - 1.2 mg/dL Final    Globulin 06/23/2025 3.1  gm/dL Final    A/G Ratio 06/23/2025 1.4  g/dL Final    BUN/Creatinine Ratio 06/23/2025 30.9 (H)  7.0 - 25.0 Final    Anion Gap 06/23/2025 15.1 (H)  5.0 - 15.0 mmol/L Final    eGFR 06/23/2025 34.4 (L)  >60.0 mL/min/1.73 Final    HS Troponin T 06/23/2025 9  <22 ng/L Final    QT Interval 06/23/2025 429  ms Final    QTC Interval 06/23/2025 437  ms Final    WBC 06/23/2025 12.87 (H)  3.40 - 10.80 10*3/mm3 Final    RBC 06/23/2025 5.38  4.14 - 5.80 10*6/mm3 Final    Hemoglobin 06/23/2025 15.4  13.0 - 17.7 g/dL Final    Hematocrit 06/23/2025 46.7  37.5 - 51.0 % Final    MCV 06/23/2025 86.8  79.0 - 97.0 fL Final    MCH 06/23/2025 28.6  26.6 - 33.0 pg Final    MCHC 06/23/2025 33.0  31.5 - 35.7 g/dL Final    RDW 06/23/2025 13.0  12.3 - 15.4 % Final    RDW-SD 06/23/2025 40.3  37.0 - 54.0 fl Final    MPV 06/23/2025 9.3  6.0 - 12.0 fL Final    Platelets 06/23/2025 307  140 - 450 10*3/mm3 Final    Neutrophil % 06/23/2025 64.1  42.7 - 76.0 % Final    Lymphocyte % 06/23/2025 21.6  19.6 - 45.3 % Final    Monocyte % 06/23/2025 7.9  5.0 - 12.0 % Final    Eosinophil % 06/23/2025 5.4  0.3 - 6.2 % Final    Basophil % 06/23/2025 0.5  0.0 - 1.5 % Final    Immature Grans % 06/23/2025 0.5  0.0 - 0.5 % Final    Neutrophils, Absolute 06/23/2025 8.25 (H)  1.70 - 7.00 10*3/mm3 Final    Lymphocytes, Absolute 06/23/2025 2.78  0.70 - 3.10 10*3/mm3 Final    Monocytes, Absolute 06/23/2025 1.02 (H)  0.10 - 0.90 10*3/mm3 Final    Eosinophils, Absolute 06/23/2025 0.70 (H)  0.00 - 0.40 10*3/mm3 Final    Basophils, Absolute 06/23/2025 0.06  0.00 - 0.20 10*3/mm3 Final    Immature Grans, Absolute 06/23/2025 0.06 (H)  0.00 - 0.05 10*3/mm3 Final    nRBC 06/23/2025 0.0  0.0 - 0.2 /100 WBC Final    HS Troponin T 06/23/2025 8  <22 ng/L Final    Troponin T Numeric Delta 06/23/2025 -1  Abnormal if >/=3 ng/L Final    Glucose 06/24/2025 115 (H)  65 - 99 mg/dL Final    BUN 06/24/2025 61.0 (H)  6.0 - 20.0  mg/dL Final    Creatinine 06/24/2025 1.57 (H)  0.76 - 1.27 mg/dL Final    Sodium 06/24/2025 137  136 - 145 mmol/L Final    Potassium 06/24/2025 4.3  3.5 - 5.2 mmol/L Final    Chloride 06/24/2025 104  98 - 107 mmol/L Final    CO2 06/24/2025 22.2  22.0 - 29.0 mmol/L Final    Calcium 06/24/2025 8.7  8.6 - 10.5 mg/dL Final    BUN/Creatinine Ratio 06/24/2025 38.9 (H)  7.0 - 25.0 Final    Anion Gap 06/24/2025 10.8  5.0 - 15.0 mmol/L Final    eGFR 06/24/2025 50.8 (L)  >60.0 mL/min/1.73 Final    WBC 06/24/2025 11.63 (H)  3.40 - 10.80 10*3/mm3 Final    RBC 06/24/2025 4.74  4.14 - 5.80 10*6/mm3 Final    Hemoglobin 06/24/2025 13.6  13.0 - 17.7 g/dL Final    Hematocrit 06/24/2025 42.1  37.5 - 51.0 % Final    MCV 06/24/2025 88.8  79.0 - 97.0 fL Final    MCH 06/24/2025 28.7  26.6 - 33.0 pg Final    MCHC 06/24/2025 32.3  31.5 - 35.7 g/dL Final    RDW 06/24/2025 12.7  12.3 - 15.4 % Final    RDW-SD 06/24/2025 41.3  37.0 - 54.0 fl Final    MPV 06/24/2025 9.7  6.0 - 12.0 fL Final    Platelets 06/24/2025 239  140 - 450 10*3/mm3 Final    Ethanol 06/23/2025 <10  0 - 10 mg/dL Final    Ethanol % 06/23/2025 <0.010  % Final    Glucose 06/25/2025 95  65 - 99 mg/dL Final    BUN 06/25/2025 26.0 (H)  6.0 - 20.0 mg/dL Final    Creatinine 06/25/2025 1.06  0.76 - 1.27 mg/dL Final    Sodium 06/25/2025 138  136 - 145 mmol/L Final    Potassium 06/25/2025 5.0  3.5 - 5.2 mmol/L Final    Slight hemolysis detected by analyzer. Result may be falsely elevated.    Chloride 06/25/2025 107  98 - 107 mmol/L Final    CO2 06/25/2025 21.5 (L)  22.0 - 29.0 mmol/L Final    Calcium 06/25/2025 8.5 (L)  8.6 - 10.5 mg/dL Final    BUN/Creatinine Ratio 06/25/2025 24.5  7.0 - 25.0 Final    Anion Gap 06/25/2025 9.5  5.0 - 15.0 mmol/L Final    eGFR 06/25/2025 81.3  >60.0 mL/min/1.73 Final    Uric Acid 06/25/2025 5.0  3.4 - 7.0 mg/dL Final    WBC 06/25/2025 6.93  3.40 - 10.80 10*3/mm3 Final    RBC 06/25/2025 4.97  4.14 - 5.80 10*6/mm3 Final    Hemoglobin 06/25/2025 14.3  13.0  - 17.7 g/dL Final    Hematocrit 06/25/2025 42.9  37.5 - 51.0 % Final    MCV 06/25/2025 86.3  79.0 - 97.0 fL Final    MCH 06/25/2025 28.8  26.6 - 33.0 pg Final    MCHC 06/25/2025 33.3  31.5 - 35.7 g/dL Final    RDW 06/25/2025 13.0  12.3 - 15.4 % Final    RDW-SD 06/25/2025 40.4  37.0 - 54.0 fl Final    MPV 06/25/2025 9.8  6.0 - 12.0 fL Final    Platelets 06/25/2025 223  140 - 450 10*3/mm3 Final    Magnesium 06/25/2025 2.3  1.6 - 2.6 mg/dL Final    Phosphorus 06/25/2025 2.4 (L)  2.5 - 4.5 mg/dL Final    Glucose 06/26/2025 98  65 - 99 mg/dL Final    BUN 06/26/2025 19.0  6.0 - 20.0 mg/dL Final    Creatinine 06/26/2025 1.01  0.76 - 1.27 mg/dL Final    Sodium 06/26/2025 139  136 - 145 mmol/L Final    Potassium 06/26/2025 4.6  3.5 - 5.2 mmol/L Final    Chloride 06/26/2025 107  98 - 107 mmol/L Final    CO2 06/26/2025 23.1  22.0 - 29.0 mmol/L Final    Calcium 06/26/2025 8.8  8.6 - 10.5 mg/dL Final    BUN/Creatinine Ratio 06/26/2025 18.8  7.0 - 25.0 Final    Anion Gap 06/26/2025 8.9  5.0 - 15.0 mmol/L Final    eGFR 06/26/2025 86.2  >60.0 mL/min/1.73 Final    WBC 06/26/2025 8.44  3.40 - 10.80 10*3/mm3 Final    RBC 06/26/2025 4.96  4.14 - 5.80 10*6/mm3 Final    Hemoglobin 06/26/2025 14.2  13.0 - 17.7 g/dL Final    Hematocrit 06/26/2025 42.4  37.5 - 51.0 % Final    MCV 06/26/2025 85.5  79.0 - 97.0 fL Final    MCH 06/26/2025 28.6  26.6 - 33.0 pg Final    MCHC 06/26/2025 33.5  31.5 - 35.7 g/dL Final    RDW 06/26/2025 12.5  12.3 - 15.4 % Final    RDW-SD 06/26/2025 38.3  37.0 - 54.0 fl Final    MPV 06/26/2025 9.5  6.0 - 12.0 fL Final    Platelets 06/26/2025 227  140 - 450 10*3/mm3 Final   Orders Only on 06/24/2025   Component Date Value Ref Range Status    Date Time Interrogation Session 06/24/2025 [id number]   Final    Type Interrogation Session 06/24/2025 Remote   Final    Implantable Pulse Generator Manufa* 06/24/2025 [employer]   Final    Implantable Pulse Generator Type 06/24/2025 ILR   Final    Implantable Pulse Generator  Model 06/24/2025 [id number]   Final    Implantable Pulse Generator Serial* 06/24/2025 [id number]   Final    Implantable Pulse Generator Implan* 06/24/2025 [id number]   Final    Battery Status 06/24/2025 OK   Final    Zone Setting Type Category 06/24/2025 Pa   Final    IDC RATE 1 06/24/2025 30   Final    Zone Setting Detection Details 06/24/2025 4   Final    Zone Setting Status 06/24/2025 On   Final    Zone ID 06/24/2025 1   Final    Zone Setting Type Category 06/24/2025 Pause   Final    IDC RATE 1 06/24/2025 3   Final    Zone Setting Status 06/24/2025 On   Final    Zone ID 06/24/2025 2   Final    Zone Setting Type Category 06/24/2025 AF   Final    Zone Setting Status 06/24/2025 On   Final    Zone ID 06/24/2025 3   Final    Zone Setting Type Category 06/24/2025 AT   Final    Zone Setting Status 06/24/2025 Off   Final    Zone ID 06/24/2025 4   Final    Zone Setting Type Category 06/24/2025 Tachy   Final    IDC RATE 1 06/24/2025 176   Final    Zone Setting Detection Details 06/24/2025 16   Final    Zone Setting Status 06/24/2025 On   Final    Zone ID 06/24/2025 7   Final   Office Visit on 06/18/2025   Component Date Value Ref Range Status    Hemoglobin A1C 07/15/2025 5.40  4.80 - 5.60 % Final    PSA 07/15/2025 0.361  0.000 - 4.000 ng/mL Final    Total Cholesterol 07/15/2025 196  0 - 200 mg/dL Final    Triglycerides 07/15/2025 203 (H)  0 - 150 mg/dL Final    HDL Cholesterol 07/15/2025 32 (L)  40 - 60 mg/dL Final    LDL Cholesterol  07/15/2025 128 (H)  0 - 100 mg/dL Final    VLDL Cholesterol 07/15/2025 36  5 - 40 mg/dL Final    LDL/HDL Ratio 07/15/2025 3.86   Final   Admission on 05/21/2025, Discharged on 05/21/2025   Component Date Value Ref Range Status    QT Interval 05/21/2025 398  ms Final    QTC Interval 05/21/2025 463  ms Final    Glucose 05/21/2025 103 (H)  65 - 99 mg/dL Final    BUN 05/21/2025 25 (H)  6 - 20 mg/dL Final    Creatinine 05/21/2025 1.00  0.76 - 1.27 mg/dL Final    Sodium 05/21/2025 137  136 - 145 mmol/L  Final    Potassium 05/21/2025 4.0  3.5 - 5.2 mmol/L Final    Chloride 05/21/2025 103  98 - 107 mmol/L Final    CO2 05/21/2025 21.6 (L)  22.0 - 29.0 mmol/L Final    Calcium 05/21/2025 9.7  8.6 - 10.5 mg/dL Final    Total Protein 05/21/2025 7.6  6.0 - 8.5 g/dL Final    Albumin 05/21/2025 4.5  3.5 - 5.2 g/dL Final    ALT (SGPT) 05/21/2025 23  1 - 41 U/L Final    AST (SGOT) 05/21/2025 19  1 - 40 U/L Final    Alkaline Phosphatase 05/21/2025 79  39 - 117 U/L Final    Total Bilirubin 05/21/2025 0.5  0.0 - 1.2 mg/dL Final    Globulin 05/21/2025 3.1  gm/dL Final    A/G Ratio 05/21/2025 1.5  g/dL Final    BUN/Creatinine Ratio 05/21/2025 25.0  7.0 - 25.0 Final    Anion Gap 05/21/2025 12.4  5.0 - 15.0 mmol/L Final    eGFR 05/21/2025 87.8  >60.0 mL/min/1.73 Final    HS Troponin T 05/21/2025 8  <22 ng/L Final    proBNP 05/21/2025 220.0  0.0 - 900.0 pg/mL Final    Ethanol 05/21/2025 <10  0 - 10 mg/dL Final    Ethanol % 05/21/2025 <0.010  % Final    THC, Screen, Urine 05/21/2025 Positive (A)  Negative Final    Phencyclidine (PCP), Urine 05/21/2025 Negative  Negative Final    Cocaine Screen, Urine 05/21/2025 Negative  Negative Final    Methamphetamine, Ur 05/21/2025 Negative  Negative Final    Opiate Screen 05/21/2025 Negative  Negative Final    Amphetamine Screen, Urine 05/21/2025 Negative  Negative Final    Benzodiazepine Screen, Urine 05/21/2025 Negative  Negative Final    Tricyclic Antidepressants Screen 05/21/2025 Negative  Negative Final    Methadone Screen, Urine 05/21/2025 Negative  Negative Final    Barbiturates Screen, Urine 05/21/2025 Negative  Negative Final    Oxycodone Screen, Urine 05/21/2025 Negative  Negative Final    Buprenorphine, Screen, Urine 05/21/2025 Negative  Negative Final    WBC 05/21/2025 12.41 (H)  3.40 - 10.80 10*3/mm3 Final    RBC 05/21/2025 5.97 (H)  4.14 - 5.80 10*6/mm3 Final    Hemoglobin 05/21/2025 17.2  13.0 - 17.7 g/dL Final    Hematocrit 05/21/2025 52.7 (H)  37.5 - 51.0 % Final    MCV 05/21/2025  88.3  79.0 - 97.0 fL Final    MCH 05/21/2025 28.8  26.6 - 33.0 pg Final    MCHC 05/21/2025 32.6  31.5 - 35.7 g/dL Final    RDW 05/21/2025 12.8  12.3 - 15.4 % Final    RDW-SD 05/21/2025 41.4  37.0 - 54.0 fl Final    MPV 05/21/2025 10.4  6.0 - 12.0 fL Final    Platelets 05/21/2025 234  140 - 450 10*3/mm3 Final    Neutrophil % 05/21/2025 75.0  42.7 - 76.0 % Final    Lymphocyte % 05/21/2025 14.6 (L)  19.6 - 45.3 % Final    Monocyte % 05/21/2025 7.9  5.0 - 12.0 % Final    Eosinophil % 05/21/2025 1.8  0.3 - 6.2 % Final    Basophil % 05/21/2025 0.5  0.0 - 1.5 % Final    Immature Grans % 05/21/2025 0.2  0.0 - 0.5 % Final    Neutrophils, Absolute 05/21/2025 9.31 (H)  1.70 - 7.00 10*3/mm3 Final    Lymphocytes, Absolute 05/21/2025 1.81  0.70 - 3.10 10*3/mm3 Final    Monocytes, Absolute 05/21/2025 0.98 (H)  0.10 - 0.90 10*3/mm3 Final    Eosinophils, Absolute 05/21/2025 0.22  0.00 - 0.40 10*3/mm3 Final    Basophils, Absolute 05/21/2025 0.06  0.00 - 0.20 10*3/mm3 Final    Immature Grans, Absolute 05/21/2025 0.03  0.00 - 0.05 10*3/mm3 Final    nRBC 05/21/2025 0.0  0.0 - 0.2 /100 WBC Final    Fentanyl, Urine 05/21/2025 Negative  Negative Final   Hospital Outpatient Visit on 03/12/2025   Component Date Value Ref Range Status    Glucose 03/12/2025 103 (H)  65 - 99 mg/dL Final    BUN 03/12/2025 17  6 - 20 mg/dL Final    Creatinine 03/12/2025 0.94  0.76 - 1.27 mg/dL Final    Sodium 03/12/2025 137  136 - 145 mmol/L Final    Potassium 03/12/2025 4.2  3.5 - 5.2 mmol/L Final    Chloride 03/12/2025 104  98 - 107 mmol/L Final    CO2 03/12/2025 21.6 (L)  22.0 - 29.0 mmol/L Final    Calcium 03/12/2025 9.4  8.6 - 10.5 mg/dL Final    BUN/Creatinine Ratio 03/12/2025 18.1  7.0 - 25.0 Final    Anion Gap 03/12/2025 11.4  5.0 - 15.0 mmol/L Final    eGFR 03/12/2025 94.6  >60.0 mL/min/1.73 Final   Admission on 02/23/2025, Discharged on 02/27/2025   Component Date Value Ref Range Status    HS Troponin T 02/23/2025 <6  <22 ng/L Final    HS Troponin T  02/23/2025 <6  <22 ng/L Final    Troponin T Numeric Delta 02/23/2025    Final    Unable to calculate.    TSH 02/23/2025 0.775  0.270 - 4.200 uIU/mL Final    VMA, Urine 02/24/2025 10.6  Undefined mg/L Final    VMA 24 Hour 02/24/2025 8.0 (H)  0.0 - 7.5 mg/24 hr Final    Epinephrine, Urine 02/24/2025 23  Undefined ug/L Final    Epinephrine, 24H Ur 02/24/2025 17  0 - 20 ug/24 hr Final    Norepinephrine, Urine 02/24/2025 131  Undefined ug/L Final    Norepinephrine, 24H Ur 02/24/2025 98  0 - 135 ug/24 hr Final    Dopamine, Urine 02/24/2025 307  Undefined ug/L Final    Dopamine , 24H Ur 02/24/2025 230  0 - 510 ug/24 hr Final    5-HIAA, Urine 02/24/2025 7.2  Undefined mg/L Final    5-HIAA, 24H Ur 02/24/2025 5.4  0.0 - 14.9 mg/24 hr Final    Aldosterone 02/24/2025 1.8  0.0 - 30.0 ng/dL Final    KID L RIGHT 02/24/2025 12.2  cm Final    KID W RIGHT 02/24/2025 6.4  cm Final    Kid L 02/24/2025 11.3  cm Final    Left kidney width 02/24/2025 4.3  cm Final    RENAL A ORG PSV RIGHT 02/24/2025 72.3  cm/s Final    RENAL A ORG EDV RIGHT 02/24/2025 17.2  cm/s Final    PROX DEMOND A PSV RIGHT 02/24/2025 61.4  cm/s Final    PROX DEMOND A EDV RIGHT 02/24/2025 26.3  cm/s Final    MID DEMOND A PSV RIGHT 02/24/2025 50.2  cm/s Final    MID DEMOND A EDV RIGHT 02/24/2025 19.0  cm/s Final    DIST DEMOND A PSV RIGHT 02/24/2025 36.5  cm/s Final    DIST DEMOND A EDV RIGHT 02/24/2025 14.1  cm/s Final    H[place]um Right PSV 02/24/2025 33.6  cm/s Final    [place] Right EDV 02/24/2025 15.6  cm/s Final    RENAL A ORG PSV LEFT 02/24/2025 47.5  cm/s Final    RENAL A ORG EDV LEFT 02/24/2025 8.7  cm/s Final    PROX DEMOND A PSV LEFT 02/24/2025 32.0  cm/s Final    PROX DEMOND A EDV LEFT 02/24/2025 12.4  cm/s Final    MID DEMOND A PSV LEFT 02/24/2025 34.3  cm/s Final    MID DEMOND A EDV LEFT 02/24/2025 13.9  cm/s Final    DIST DEMOND A PSV LEFT 02/24/2025 36.3  cm/s Final    DIST DEMOND A EDV LEFT 02/24/2025 14.1  cm/s Final    [place] Left PSV 02/24/2025 44.1  cm/s Final    Hilum Left EDV 02/24/2025  15.3  cm/s Final    Right renal upper parenchyma max 02/24/2025 33.2  cm/s Final    Right renal upper parenchyma min 02/24/2025 15.7  cm/s Final    Aortic Mid PSV 02/24/2025 105.0  cm/s Final    RAR RIGHT 02/24/2025 0.58   Final    Left renal upper parenchyma max 02/24/2025 39.7  cm/s Final    Left renal upper parenchyma min 02/24/2025 15.3  cm/s Final    RAR LEFT 02/24/2025 0.35   Final    Left renal upper parenchyma RI 02/24/2025 0.61   Final    Right renal upper parenchyma RI 02/24/2025 0.53   Final    BH CV VAS BP RIGHT ARM 02/24/2025 132/86  mmHg Final    BH CV VAS BP LEFT ARM 02/24/2025 130/80  mmHg Final    Hemoglobin A1C 02/23/2025 5.50  4.80 - 5.60 % Final    Glucose 02/24/2025 111 (H)  65 - 99 mg/dL Final    BUN 02/24/2025 14  6 - 20 mg/dL Final    Creatinine 02/24/2025 0.82  0.76 - 1.27 mg/dL Final    Sodium 02/24/2025 138  136 - 145 mmol/L Final    Potassium 02/24/2025 3.7  3.5 - 5.2 mmol/L Final    Chloride 02/24/2025 106  98 - 107 mmol/L Final    CO2 02/24/2025 22.5  22.0 - 29.0 mmol/L Final    Calcium 02/24/2025 9.3  8.6 - 10.5 mg/dL Final    BUN/Creatinine Ratio 02/24/2025 17.1  7.0 - 25.0 Final    Anion Gap 02/24/2025 9.5  5.0 - 15.0 mmol/L Final    eGFR 02/24/2025 102.5  >60.0 mL/min/1.73 Final    WBC 02/24/2025 7.65  3.40 - 10.80 10*3/mm3 Final    RBC 02/24/2025 5.54  4.14 - 5.80 10*6/mm3 Final    Hemoglobin 02/24/2025 15.5  13.0 - 17.7 g/dL Final    Hematocrit 02/24/2025 46.6  37.5 - 51.0 % Final    MCV 02/24/2025 84.1  79.0 - 97.0 fL Final    MCH 02/24/2025 28.0  26.6 - 33.0 pg Final    MCHC 02/24/2025 33.3  31.5 - 35.7 g/dL Final    RDW 02/24/2025 14.0  12.3 - 15.4 % Final    RDW-SD 02/24/2025 43.3  37.0 - 54.0 fl Final    MPV 02/24/2025 9.8  6.0 - 12.0 fL Final    Platelets 02/24/2025 273  140 - 450 10*3/mm3 Final    Neutrophil % 02/24/2025 57.4  42.7 - 76.0 % Final    Lymphocyte % 02/24/2025 26.8  19.6 - 45.3 % Final    Monocyte % 02/24/2025 9.4  5.0 - 12.0 % Final    Eosinophil % 02/24/2025  5.9  0.3 - 6.2 % Final    Basophil % 02/24/2025 0.4  0.0 - 1.5 % Final    Immature Grans % 02/24/2025 0.1  0.0 - 0.5 % Final    Neutrophils, Absolute 02/24/2025 4.39  1.70 - 7.00 10*3/mm3 Final    Lymphocytes, Absolute 02/24/2025 2.05  0.70 - 3.10 10*3/mm3 Final    Monocytes, Absolute 02/24/2025 0.72  0.10 - 0.90 10*3/mm3 Final    Eosinophils, Absolute 02/24/2025 0.45 (H)  0.00 - 0.40 10*3/mm3 Final    Basophils, Absolute 02/24/2025 0.03  0.00 - 0.20 10*3/mm3 Final    Immature Grans, Absolute 02/24/2025 0.01  0.00 - 0.05 10*3/mm3 Final    nRBC 02/24/2025 0.0  0.0 - 0.2 /100 WBC Final    Aldosterone 02/24/2025 3.3  0.0 - 30.0 ng/dL Final    Renin Activity 02/24/2025 0.934  0.167 - 5.380 ng/mL/hr Final    Aldosterone/Renin Ratio 02/24/2025 3.5  0.0 - 30.0 Final                             Units:      ng/dL per ng/mL/hr    Glucose 02/25/2025 99  65 - 99 mg/dL Final    BUN 02/25/2025 15  6 - 20 mg/dL Final    Creatinine 02/25/2025 0.80  0.76 - 1.27 mg/dL Final    Sodium 02/25/2025 142  136 - 145 mmol/L Final    Potassium 02/25/2025 3.8  3.5 - 5.2 mmol/L Final    Chloride 02/25/2025 110 (H)  98 - 107 mmol/L Final    CO2 02/25/2025 21.5 (L)  22.0 - 29.0 mmol/L Final    Calcium 02/25/2025 8.8  8.6 - 10.5 mg/dL Final    BUN/Creatinine Ratio 02/25/2025 18.8  7.0 - 25.0 Final    Anion Gap 02/25/2025 10.5  5.0 - 15.0 mmol/L Final    eGFR 02/25/2025 103.2  >60.0 mL/min/1.73 Final    WBC 02/25/2025 8.35  3.40 - 10.80 10*3/mm3 Final    RBC 02/25/2025 5.41  4.14 - 5.80 10*6/mm3 Final    Hemoglobin 02/25/2025 15.3  13.0 - 17.7 g/dL Final    Hematocrit 02/25/2025 45.6  37.5 - 51.0 % Final    MCV 02/25/2025 84.3  79.0 - 97.0 fL Final    MCH 02/25/2025 28.3  26.6 - 33.0 pg Final    MCHC 02/25/2025 33.6  31.5 - 35.7 g/dL Final    RDW 02/25/2025 13.8  12.3 - 15.4 % Final    RDW-SD 02/25/2025 42.1  37.0 - 54.0 fl Final    MPV 02/25/2025 9.7  6.0 - 12.0 fL Final    Platelets 02/25/2025 243  140 - 450 10*3/mm3 Final    Neutrophil %  02/25/2025 62.1  42.7 - 76.0 % Final    Lymphocyte % 02/25/2025 22.4  19.6 - 45.3 % Final    Monocyte % 02/25/2025 8.1  5.0 - 12.0 % Final    Eosinophil % 02/25/2025 6.6 (H)  0.3 - 6.2 % Final    Basophil % 02/25/2025 0.4  0.0 - 1.5 % Final    Immature Grans % 02/25/2025 0.4  0.0 - 0.5 % Final    Neutrophils, Absolute 02/25/2025 5.19  1.70 - 7.00 10*3/mm3 Final    Lymphocytes, Absolute 02/25/2025 1.87  0.70 - 3.10 10*3/mm3 Final    Monocytes, Absolute 02/25/2025 0.68  0.10 - 0.90 10*3/mm3 Final    Eosinophils, Absolute 02/25/2025 0.55 (H)  0.00 - 0.40 10*3/mm3 Final    Basophils, Absolute 02/25/2025 0.03  0.00 - 0.20 10*3/mm3 Final    Immature Grans, Absolute 02/25/2025 0.03  0.00 - 0.05 10*3/mm3 Final    nRBC 02/25/2025 0.0  0.0 - 0.2 /100 WBC Final    Glucose 02/26/2025 107 (H)  65 - 99 mg/dL Final    BUN 02/26/2025 14  6 - 20 mg/dL Final    Creatinine 02/26/2025 0.86  0.76 - 1.27 mg/dL Final    Sodium 02/26/2025 141  136 - 145 mmol/L Final    Potassium 02/26/2025 3.2 (L)  3.5 - 5.2 mmol/L Final    Chloride 02/26/2025 111 (H)  98 - 107 mmol/L Final    CO2 02/26/2025 20.5 (L)  22.0 - 29.0 mmol/L Final    Calcium 02/26/2025 8.7  8.6 - 10.5 mg/dL Final    BUN/Creatinine Ratio 02/26/2025 16.3  7.0 - 25.0 Final    Anion Gap 02/26/2025 9.5  5.0 - 15.0 mmol/L Final    eGFR 02/26/2025 101.0  >60.0 mL/min/1.73 Final    WBC 02/26/2025 8.17  3.40 - 10.80 10*3/mm3 Final    RBC 02/26/2025 5.34  4.14 - 5.80 10*6/mm3 Final    Hemoglobin 02/26/2025 15.1  13.0 - 17.7 g/dL Final    Hematocrit 02/26/2025 46.2  37.5 - 51.0 % Final    MCV 02/26/2025 86.5  79.0 - 97.0 fL Final    MCH 02/26/2025 28.3  26.6 - 33.0 pg Final    MCHC 02/26/2025 32.7  31.5 - 35.7 g/dL Final    RDW 02/26/2025 13.9  12.3 - 15.4 % Final    RDW-SD 02/26/2025 44.0  37.0 - 54.0 fl Final    MPV 02/26/2025 10.0  6.0 - 12.0 fL Final    Platelets 02/26/2025 228  140 - 450 10*3/mm3 Final    Neutrophil % 02/26/2025 58.4  42.7 - 76.0 % Final    Lymphocyte % 02/26/2025  24.0  19.6 - 45.3 % Final    Monocyte % 02/26/2025 9.2  5.0 - 12.0 % Final    Eosinophil % 02/26/2025 7.8 (H)  0.3 - 6.2 % Final    Basophil % 02/26/2025 0.4  0.0 - 1.5 % Final    Immature Grans % 02/26/2025 0.2  0.0 - 0.5 % Final    Neutrophils, Absolute 02/26/2025 4.77  1.70 - 7.00 10*3/mm3 Final    Lymphocytes, Absolute 02/26/2025 1.96  0.70 - 3.10 10*3/mm3 Final    Monocytes, Absolute 02/26/2025 0.75  0.10 - 0.90 10*3/mm3 Final    Eosinophils, Absolute 02/26/2025 0.64 (H)  0.00 - 0.40 10*3/mm3 Final    Basophils, Absolute 02/26/2025 0.03  0.00 - 0.20 10*3/mm3 Final    Immature Grans, Absolute 02/26/2025 0.02  0.00 - 0.05 10*3/mm3 Final    nRBC 02/26/2025 0.0  0.0 - 0.2 /100 WBC Final    Potassium 02/26/2025 3.5  3.5 - 5.2 mmol/L Final    Glucose 02/27/2025 97  65 - 99 mg/dL Final    BUN 02/27/2025 10  6 - 20 mg/dL Final    Creatinine 02/27/2025 0.81  0.76 - 1.27 mg/dL Final    Sodium 02/27/2025 145  136 - 145 mmol/L Final    Potassium 02/27/2025 4.5  3.5 - 5.2 mmol/L Final    Chloride 02/27/2025 115 (H)  98 - 107 mmol/L Final    CO2 02/27/2025 20.0 (L)  22.0 - 29.0 mmol/L Final    Calcium 02/27/2025 8.7  8.6 - 10.5 mg/dL Final    BUN/Creatinine Ratio 02/27/2025 12.3  7.0 - 25.0 Final    Anion Gap 02/27/2025 10.0  5.0 - 15.0 mmol/L Final    eGFR 02/27/2025 102.8  >60.0 mL/min/1.73 Final    Normetanephrine 02/27/2025 96.2  0.0 - 244.0 pg/mL Final    Metanephrine 02/27/2025 38.3  0.0 - 88.0 pg/mL Final    WBC 02/27/2025 7.20  3.40 - 10.80 10*3/mm3 Final    RBC 02/27/2025 5.16  4.14 - 5.80 10*6/mm3 Final    Hemoglobin 02/27/2025 14.6  13.0 - 17.7 g/dL Final    Hematocrit 02/27/2025 44.3  37.5 - 51.0 % Final    MCV 02/27/2025 85.9  79.0 - 97.0 fL Final    MCH 02/27/2025 28.3  26.6 - 33.0 pg Final    MCHC 02/27/2025 33.0  31.5 - 35.7 g/dL Final    RDW 02/27/2025 13.6  12.3 - 15.4 % Final    RDW-SD 02/27/2025 42.8  37.0 - 54.0 fl Final    MPV 02/27/2025 10.2  6.0 - 12.0 fL Final    Platelets 02/27/2025 228  140 - 450  10*3/mm3 Final    Neutrophil % 02/27/2025 55.0  42.7 - 76.0 % Final    Lymphocyte % 02/27/2025 28.9  19.6 - 45.3 % Final    Monocyte % 02/27/2025 6.9  5.0 - 12.0 % Final    Eosinophil % 02/27/2025 8.3 (H)  0.3 - 6.2 % Final    Basophil % 02/27/2025 0.6  0.0 - 1.5 % Final    Immature Grans % 02/27/2025 0.3  0.0 - 0.5 % Final    Neutrophils, Absolute 02/27/2025 3.96  1.70 - 7.00 10*3/mm3 Final    Lymphocytes, Absolute 02/27/2025 2.08  0.70 - 3.10 10*3/mm3 Final    Monocytes, Absolute 02/27/2025 0.50  0.10 - 0.90 10*3/mm3 Final    Eosinophils, Absolute 02/27/2025 0.60 (H)  0.00 - 0.40 10*3/mm3 Final    Basophils, Absolute 02/27/2025 0.04  0.00 - 0.20 10*3/mm3 Final    Immature Grans, Absolute 02/27/2025 0.02  0.00 - 0.05 10*3/mm3 Final    nRBC 02/27/2025 0.0  0.0 - 0.2 /100 WBC Final   There may be more visits with results that are not included.         Assessment & Plan   Problems Addressed this Visit    None  Visit Diagnoses         Severe episode of recurrent major depressive disorder, without psychotic features    -  Primary    Relevant Medications    busPIRone (BUSPAR) 10 MG tablet    lamoTRIgine (LaMICtal) 25 MG tablet      Panic anxiety syndrome        Relevant Medications    busPIRone (BUSPAR) 10 MG tablet    lamoTRIgine (LaMICtal) 25 MG tablet      Sleep disturbances        Relevant Medications    busPIRone (BUSPAR) 10 MG tablet    lamoTRIgine (LaMICtal) 25 MG tablet      Medication management        Relevant Medications    busPIRone (BUSPAR) 10 MG tablet    lamoTRIgine (LaMICtal) 25 MG tablet          Diagnoses         Codes Comments      Severe episode of recurrent major depressive disorder, without psychotic features    -  Primary ICD-10-CM: F33.2  ICD-9-CM: 296.33       Panic anxiety syndrome     ICD-10-CM: F41.0  ICD-9-CM: 300.01       Sleep disturbances     ICD-10-CM: G47.9  ICD-9-CM: 780.50       Medication management     ICD-10-CM: Z79.899  ICD-9-CM: V58.69             Visit Diagnoses:    ICD-10-CM  ICD-9-CM   1. Severe episode of recurrent major depressive disorder, without psychotic features  F33.2 296.33   2. Panic anxiety syndrome  F41.0 300.01   3. Sleep disturbances  G47.9 780.50   4. Medication management  Z79.899 V58.69     Continue Viibryd, does not need refills at this point.  Lamictal increased to 50 mg twice daily.  Discussed continuing to monitor for rash with use and present to ER should this occur.  After increasing Lamictal for 1 week, patient to reinstate BuSpar at 10 mg twice daily for 1 week and if doing well increase to 50 mg twice daily thereafter. Previously educated upon side effects with use of these medications as well as when to present for emergency services, denies at this time. Instructions sent regarding use of medications attached to visit of initial prescribing date.  Follow through with therapy.  Follow-up this provider in 4 to 6 weeks or sooner if needed.    Discussed medication options and treatment plan of prescribed medication, any off label use of medication, as well as the risks, benefits, any black box warnings including increased suicidality, and side effects including but not limited to potential falls, dizziness, possible impaired driving, GI side effects (change in appetite, abdominal discomfort, nausea, vomiting, diarrhea, and/or constipation), dry mouth, somnolence, sedation, insomnia, activation, agitation, irritation, tremors, abnormal muscle movements or disorders, tardive dyskinesia, akathisia, asthenia, headache, sweating, possible bruising or rare bleeding, electrolyte and/or fluid abnormalities, change in blood pressure/heart rate/and or heart rhythm, hypotension, sexual dysfunction, rare impulse control problems, rare seizures, rare neuroleptic malignant syndrome, increased risk of death and cerebrovascular events, change in blood glucose and increased risk for diabetes, change in triglycerides and cholesterol and increased risk for dyslipidemia,  weight  gain, weight gain that can become problematic to health, skin conditions and reactions, and metabolic adversities among others. Patient and/or guardian are agreeable to call the office with any worsening of symptoms or onset of side effects, or if any concerns or questions arise.  The contact information for the office is made available to the patient and/or guardian. Patient and/or guardian are agreeable to call 911 or go to the nearest ER should they begin having any SI/HI, or if any urgent concerns arise.        GOALS:  Short Term Goals: Patient will be compliant with medication, and patient will have no significant medication related side effects.  Patient will be engaged in psychotherapy as indicated.  Patient will report subjective improvement of symptoms.  Long term goals: To stabilize mood and treat/improve subjective symptoms, the patient will stay out of the hospital, the patient will be at an optimal level of functioning, and the patient will take all medications as prescribed.  The patient/guardian verbalized understanding and agreement with goals that were mutually set.    Discussed practice no show policy, informed patient 3 no shows would results to referral for in-person psychiatry to better assure compliance in addressing mental health.    Discussed limitations to telehealth, if at any point a higher level of care or closer monitoring would be warranted, referral to in person psychiatry would be placed. Also this provider does not make determinations related to disability status. If at any point in time patient feels they need to obtain disability, a referral to a higher level of care with in person psychiatrist will be made to more appropriately navigate determination of disability, whether short or long term is needed.     TREATMENT PLAN: Continue supportive psychotherapy efforts and medications as indicated.  Pharmacological and Non-Pharmacological treatment options discussed during today's  visit. Patient/Guardian acknowledged and verbally consented with current treatment plan and was educated on the importance of compliance with treatment and follow-up appointments.      MEDICATION ISSUES:  Discussed medication options and treatment plan of prescribed medication as well as the risks, benefits, any black box warnings, and side effects including potential falls, possible impaired driving, and metabolic adversities among others. Patient is agreeable to call the office with any worsening of symptoms or onset of side effects, or if any concerns or questions arise.  The contact information for the office is made available to the patient. Patient is agreeable to call 911 or go to the nearest ER should they begin having any SI/HI, or if any urgent concerns arise. No medication side effects or related complaints today.     MEDS ORDERED DURING VISIT:  New Medications Ordered This Visit   Medications    busPIRone (BUSPAR) 10 MG tablet     Sig: Take 1 tab by mouth twice daily x 1 week, if tolerated increase to 1.5 tabs by mouth twice daily thereafter.     Dispense:  90 tablet     Refill:  1    lamoTRIgine (LaMICtal) 25 MG tablet     Sig: Take 2 tabs by mouth twice daily.     Dispense:  120 tablet     Refill:  1       Follow Up Appointment:   Return in about 4 weeks (around 8/28/2025) for Recheck.       Patient or patient representative verbalized consent for the use of Ambient Listening during the visit with  JANEEN Palmer for chart documentation. 7/31/2025  07:27 EDT        This document has been electronically signed by JANEEN Palmer  July 31, 2025 07:56 EDT    Some of the data in this electronic note has been brought forward from a previous encounter, any necessary changes have been made, it has been reviewed by this JANEEN, and it is accurate.    Dictated Utilizing Dragon Dictation: Part of this note may be an electronic transcription/translation of spoken language to printed text using the  [facility] Dictation System.

## 2025-08-01 ENCOUNTER — OFFICE VISIT (OUTPATIENT)
Age: 60
End: 2025-08-01
Payer: COMMERCIAL

## 2025-08-01 VITALS
HEIGHT: 74 IN | HEART RATE: 61 BPM | WEIGHT: 221 LBS | OXYGEN SATURATION: 91 % | DIASTOLIC BLOOD PRESSURE: 80 MMHG | SYSTOLIC BLOOD PRESSURE: 102 MMHG | BODY MASS INDEX: 28.36 KG/M2

## 2025-08-01 DIAGNOSIS — R29.6 RECURRENT FALLS: ICD-10-CM

## 2025-08-01 DIAGNOSIS — Z95.818 PRESENCE OF WATCHMAN LEFT ATRIAL APPENDAGE CLOSURE DEVICE: ICD-10-CM

## 2025-08-01 DIAGNOSIS — I48.0 PAF (PAROXYSMAL ATRIAL FIBRILLATION): Primary | ICD-10-CM

## 2025-08-01 RX ORDER — LISINOPRIL 10 MG/1
10 TABLET ORAL DAILY
Qty: 90 TABLET | Refills: 2 | Status: SHIPPED | OUTPATIENT
Start: 2025-08-01

## 2025-08-01 NOTE — PROGRESS NOTES
[facility] Follow Up Office Note     Encounter Date:25  Patient:Flako Coreas Sr.  :1965  MRN:[id number]      Chief Complaint:   Chief Complaint   Patient presents with    Follow-up    paroxysmal atrial fibrillation    Essential hypertension         History of Presenting Illness:      Flako Coreas Sr. is a 60 y.o. male  that follows with  and is new to me today. They have a medical history of paroxysmal atrial fibrillation, essential hypertension, recurrent syncopal episodes, who has a loop recorder in place. They are here today for a follow-up.      Patient was seen in office by Dr. Be on 2025 for watchman evaluation.  He had recently been hospitalized with another syncopal episode in the setting of dehydration and hypotension.  He also has reported history of GI bleed.  He elected to move forward with Watchman procedure.    2025 patient underwent successful placement of 27  mm Watchman Flx Pro left atrial appendage closure device with no residual leak.  No shoulder.  3 device noted to have 20% compression. No device related thrombus at the end of the procedure.  Follow-up echocardiogram demonstrated Watchman device in expected area of the left atrial appendage.  No freely movable elements and no pericardial effusion.  Ejection fraction 61 to 65%.  He did have some bradycardia following procedure.  Carvedilol was decreased to 3.125 mg twice daily.  Additionally he was recommended to restart anticoagulation and continue for 45 days with aspirin until his 45-day transesophageal echocardiogram at that time we will discuss with Dr. Be in office about discontinuing anticoagulation and switching to dual antiplatelet therapy.    Patient reports today for follow-up.  Overall he has been doing well following his watchman.  He did report 1 episode of fainting on Tuesday.  When he woke up his blood pressure was 89/66.  Blood pressure today slightly improved.  He denies  chest pain, shortness breath, potation's, peripheral edema.  He does report pretty constant fatigue.  Denies any signs and symptoms of bleeding.  Additionally denies any concerns of complications with his insertion sites.    Review of Systems:  Review of Systems   Constitutional: Positive for malaise/fatigue.   HENT: Negative.     Eyes: Negative.    Cardiovascular:  Positive for syncope. Negative for chest pain, dyspnea on exertion, irregular heartbeat, leg swelling, orthopnea and palpitations.   Respiratory:  Negative for cough, shortness of breath and snoring.    Hematologic/Lymphatic: Negative.    Skin: Negative.    Musculoskeletal: Negative.    Gastrointestinal: Negative.    Genitourinary: Negative.    Neurological:  Negative for dizziness, headaches and light-headedness.   Psychiatric/Behavioral: Negative.         Current Outpatient Medications on File Prior to Visit   Medication Sig Dispense Refill    apixaban (ELIQUIS) 5 MG tablet tablet Take 1 tablet by mouth 2 (Two) Times a Day. 30 tablet 1    aspirin 81 MG chewable tablet Chew 1 tablet Daily.      atorvastatin (LIPITOR) 40 MG tablet Take 0.5 tablets by mouth Every Night. 45 tablet 3    busPIRone (BUSPAR) 10 MG tablet Take 1 tab by mouth twice daily x 1 week, if tolerated increase to 1.5 tabs by mouth twice daily thereafter. 90 tablet 1    carvedilol (COREG) 3.125 MG tablet Take 1 tablet by mouth 2 (Two) Times a Day With Meals. 60 tablet 3    folic acid (FOLVITE) 1 MG tablet Take 1 tablet by mouth Daily. 30 tablet 0    lamoTRIgine (LaMICtal) 25 MG tablet Take 2 tabs by mouth twice daily. 120 tablet 1    pantoprazole (PROTONIX) 40 MG EC tablet Take 1 tablet by mouth Every Morning Before Breakfast. 30 tablet 0    thiamine (VITAMIN B1) 100 MG tablet Take 1 tablet by mouth Daily. 30 tablet 0    vilazodone (Viibryd) 40 MG tablet tablet Take 1 tablet by mouth Daily. 30 tablet 6    vitamin B-12 (CYANOCOBALAMIN) 500 MCG tablet Take 1 tablet by mouth Daily. 90  tablet 0    [DISCONTINUED] lisinopril (PRINIVIL,ZESTRIL) 20 MG tablet Take 1 tablet by mouth Daily. 90 tablet 3    [] valACYclovir (VALTREX) 1000 MG tablet Take 1 tablet by mouth 3 (Three) Times a Day With Meals for 7 days. 21 tablet 0     No current facility-administered medications on file prior to visit.       No Known Allergies    Past Medical History:   Diagnosis Date    ADHD (attention deficit hyperactivity disorder)     On going issue    Alcohol abuse     Anxiety     Lepe's esophagus     Benign prostatic hyperplasia Surgery in 2021    Brain concussion 2023    Clotting disorder Intestinal    Depression     Diverticulitis     Diverticulosis     Duodenitis     Enteritis     Failure to thrive (child)     Family history of blood clots     Fracture of wrist 1985    Fracture, foot 2019    GERD (gastroesophageal reflux disease)     Hyperlipidemia     Hypertension     Hypertensive emergency     Implantable loop recorder present 2025    Irritable bowel syndrome 2017    Low testosterone     Microcytosis     Pancreatitis     Peptic ulcer disease 2025    Pneumonia     PONV (postoperative nausea and vomiting)     Seasonal affective disorder     Shoulder injury     Stroke     Unexplained weight loss     Visual impairment 2023       Past Surgical History:   Procedure Laterality Date    ABDOMINAL SURGERY      ATRIAL APPENDAGE EXCLUSION LEFT WITH TRANSESOPHAGEAL ECHOCARDIOGRAM N/A 2025    Procedure: Atrial Appendage Occlusion via Watchman;  Surgeon: Saúl Be MD;  Location:  [facility];  Service: Cardiology;  Laterality: N/A;    CARDIAC ELECTROPHYSIOLOGY PROCEDURE N/A 2024    Procedure: Loop insertion- [employer] LINQ;  Surgeon: Kaela Walker MD;  Location:  [facility];  Service: Cardiovascular;  Laterality: N/A;    COLON SURGERY      COLONOSCOPY      COLONOSCOPY N/A 2022    Procedure: COLONOSCOPY INTO CECUM WITH HOT SNARE  POLYPECTOMY;  Surgeon: Albert Gallo MD;  Location:  [facility];  Service: Gastroenterology;  Laterality: N/A;  PRE- GI BLEED  POST- DIVERTICULOSIS, POLYP    COLONOSCOPY N/A 02/14/2024    Procedure: COLONOSCOPY into cecum/terminal ileum;  Surgeon: Albert Gallo MD;  Location: [facility] END[facility]COPY;  Service: Gastroenterology;  Laterality: N/A;  diverticulosis, hemorrhoids    ENDOSCOPY N/A 12/10/2022    Procedure: ESOPHAGOGASTRODUODENOSCOPY;  Surgeon: Albert Gallo MD;  Location: [facility] ENDOSCOPY;  Service: Gastroenterology;  Laterality: N/A;  PRE- DARK STOOLS  POST- BARRETTS ESOPHAGUS    ENDOSCOPY N/A 02/14/2024    Procedure: ESOPHAGOGASTRODUODENOSCOPY with biopsy;  Surgeon: Albert Gallo MD;  Location: [facility] ENDOSCOPY;  Service: Gastroenterology;  Laterality: N/A;  long segment wilson's, hiatal hernia    FRACTURE SURGERY Right 1980    for broken arm    FRACTURE SURGERY Right     for broken hand    GASTRECTOMY      HAND SURGERY  1990    INCISION AND DRAINAGE ABSCESS  2015    anal    JOINT REPLACEMENT  2022    Rebuild shoulder    PROSTATE SURGERY      SHOULDER SURGERY  2022    SKIN BIOPSY      SMALL INTESTINE SURGERY      TONSILLECTOMY      TRIGGER POINT INJECTION  2017       Social History     Socioeconomic History    Marital status:    Tobacco Use    Smoking status: Never     Passive exposure: Never    Smokeless tobacco: Never   Vaping Use    Vaping status: Never Used   Substance and Sexual Activity    Alcohol use: Not Currently     Comment: Hx Abuse    Drug use: Not Currently    Sexual activity: Defer     Partners: Female     Birth control/protection: Other, Birth control pill       Family History   Problem Relation Age of Onset    Stroke Mother     Heart disease Mother     Stroke Father     Hyperlipidemia Father     Hypertension Father        The following portions of the patient's history were reviewed and updated as appropriate: allergies, current medications, past family history, past  "medical history, past social history, past surgical history and problem list.       Objective:       Vitals:    08/01/25 1025   BP: 102/80   BP Location: Left arm   Patient Position: Sitting   Cuff Size: Adult   Pulse: 61   SpO2: 91%   Weight: 100 kg (221 lb)   Height: 188 cm (74\")         Physical Exam  Vitals and nursing note reviewed.   Constitutional:       Appearance: Normal appearance. He is normal weight.   Eyes:      Extraocular Movements: Extraocular movements intact.      Pupils: Pupils are equal, round, and reactive to light.   Cardiovascular:      Rate and Rhythm: Normal rate and regular rhythm.      Chest Wall: PMI is not displaced. No thrill.      Pulses: Normal pulses.      Heart sounds: Normal heart sounds.   Pulmonary:      Effort: Pulmonary effort is normal.      Breath sounds: Normal breath sounds.   Musculoskeletal:      Cervical back: Normal range of motion.      Right lower leg: No edema.      Left lower leg: No edema.   Skin:     General: Skin is warm and dry.      Comments: groin sites well-healed.  No redness, drainage, tenderness palpation.  No palpable hematoma.   Neurological:      General: No focal deficit present.      Mental Status: He is alert and oriented to person, place, and time. Mental status is at baseline.   Psychiatric:         Mood and Affect: Mood normal.         Behavior: Behavior normal.         Thought Content: Thought content normal.         Judgment: Judgment normal.               Lab Results   Component Value Date     07/24/2025     07/15/2025    K 4.2 07/24/2025    K 5.0 07/15/2025     (H) 07/24/2025     (H) 07/15/2025    CO2 18.8 (L) 07/24/2025    CO2 25.0 07/15/2025    BUN 18.0 07/24/2025    BUN 18.0 07/15/2025    CREATININE 0.94 07/24/2025    CREATININE 0.98 07/15/2025    EGFRIFNONA 98 11/05/2018    EGFRIFNONA 63 01/01/2018    EGFRIFAFRI >60 08/27/2022    EGFRIFAFRI >60 06/29/2022    GLUCOSE 106 (H) 07/24/2025    GLUCOSE 98 07/15/2025    " CALCIUM 8.3 (L) 07/24/2025    CALCIUM 9.3 07/15/2025    ALBUMIN 4.3 06/23/2025    ALBUMIN 4.5 05/21/2025    BILITOT 0.4 06/23/2025    BILITOT 0.5 05/21/2025    AST 18 06/23/2025    AST 19 05/21/2025    ALT 26 06/23/2025    ALT 23 05/21/2025     Lab Results   Component Value Date    WBC 11.51 (H) 07/24/2025    WBC 6.28 07/15/2025    HGB 13.3 07/24/2025    HGB 14.0 07/15/2025    HCT 42.2 07/24/2025    HCT 42.8 07/15/2025    MCV 91.7 07/24/2025    MCV 87.7 07/15/2025     07/24/2025     07/15/2025     Lab Results   Component Value Date    CHOL 196 07/15/2025    CHOL 163 01/26/2025    TRIG 203 (H) 07/15/2025    TRIG 154 (H) 01/26/2025    HDL 32 (L) 07/15/2025    HDL 33 (L) 01/26/2025     (H) 07/15/2025     (H) 01/26/2025     Lab Results   Component Value Date    PROBNP 220.0 05/21/2025    PROBNP 50.4 01/25/2025    BNP 50.8 02/12/2019    BNP 29.2 09/18/2018     Lab Results   Component Value Date    CKTOTAL 523 (H) 11/19/2021    TROPONINI <0.010 06/20/2024    TROPONINT 8 06/23/2025     Lab Results   Component Value Date    TSH 0.775 02/23/2025    TSH 1.520 01/28/2025           ECG 12 Lead    Date/Time: 8/1/2025 10:42 AM  Performed by: Dharmesh Mcmahon APRN    Authorized by: Dharmesh Mcmahon APRN  Comparison: compared with previous ECG from 7/10/2025  Similar to previous ECG  Rhythm: sinus rhythm  Rate: normal  BPM: 61  Conduction: conduction normal  ST Segments: ST segments normal  T Waves: T waves normal  Other: no other findings        Echocardiogram 7/24/2025  Left ventricular ejection fraction appears to be 61 - 65%.  The left atrial cavity is mildly dilated.  A Watchman device is noted in the expected area near the left atrial appendage.  There are no freely mobile elements.  There is no pericardial effusion..    Watchman 7/23/2025  Transesophageal imaging preimplantation demonstrated a maximum ostial diameter of 20 mm  Invasive pressures demonstrated a mean left atrial pressure of    Successful placement of 27  mm Watchman Flx Pro left atrial appendage closure device with no residual leak.  No shoulder.  3 device noted to have 20% compression. No device related thrombus at the end of the procedure.        Assessment:         Diagnoses and all orders for this visit:    1. PAF (paroxysmal atrial fibrillation) (Primary)  -     ECG 12 Lead    2. Recurrent falls    3. Presence of Watchman left atrial appendage closure device    Other orders  -     lisinopril (PRINIVIL,ZESTRIL) 10 MG tablet; Take 1 tablet by mouth Daily.  Dispense: 90 tablet; Refill: 2            Plan:       Flako Coreas Sr. is a 60 y.o. male with medical history of paroxysmal atrial fibrillation, essential hypertension, recurrent syncopal episodes, who has a loop recorder in place. They are here today for a follow-up.  Overall patient is doing well following his watchman.  Unfortunately he did have another episode where he had fallen.  He reports his blood pressure at that time was 89/66.  Will de-escalate his lisinopril to 10 mg.  Bilateral groin insertion sites healing appropriately.    Paroxysmal atrial fibrillation  Status post watchman 7/23/2025  Continue Eliquis and aspirin    Hypertension  Blood pressure very soft today in the office.  Will de-escalate lisinopril down to 10 mg daily  Encourage patient to monitor blood pressure at home    Recurrent falls  Patient reported another episode on 7/29.  Blood pressure at that time 89/66  Decrease lisinopril to 10 mg daily    Follow-up with Dr. Be as scheduled.    JANEEN Elmore  [facility]  08/01/25  13:35 EDT

## 2025-08-03 ENCOUNTER — HOSPITAL ENCOUNTER (OUTPATIENT)
Facility: HOSPITAL | Age: 60
Discharge: HOME | End: 2025-08-03
Attending: EMERGENCY MEDICINE | Admitting: STUDENT IN AN ORGANIZED HEALTH CARE EDUCATION/TRAINING PROGRAM
Payer: COMMERCIAL

## 2025-08-03 ENCOUNTER — DOCUMENTATION (OUTPATIENT)
Dept: CARDIOLOGY | Age: 60
End: 2025-08-03
Payer: COMMERCIAL

## 2025-08-03 ENCOUNTER — APPOINTMENT (OUTPATIENT)
Dept: GENERAL RADIOLOGY | Facility: HOSPITAL | Age: 60
End: 2025-08-03
Payer: COMMERCIAL

## 2025-08-03 VITALS
TEMPERATURE: 98 F | RESPIRATION RATE: 16 BRPM | WEIGHT: 220 LBS | HEIGHT: 74 IN | HEART RATE: 57 BPM | OXYGEN SATURATION: 92 % | DIASTOLIC BLOOD PRESSURE: 83 MMHG | BODY MASS INDEX: 28.23 KG/M2 | SYSTOLIC BLOOD PRESSURE: 106 MMHG

## 2025-08-03 DIAGNOSIS — I48.91 ATRIAL FIBRILLATION WITH RAPID VENTRICULAR RESPONSE: Primary | ICD-10-CM

## 2025-08-03 LAB
ALBUMIN SERPL-MCNC: 4.2 G/DL (ref 3.5–5.2)
ALBUMIN/GLOB SERPL: 1.6 G/DL
ALP SERPL-CCNC: 71 U/L (ref 39–117)
ALT SERPL W P-5'-P-CCNC: 20 U/L (ref 1–41)
ANION GAP SERPL CALCULATED.3IONS-SCNC: 13.5 MMOL/L (ref 5–15)
AST SERPL-CCNC: 17 U/L (ref 1–40)
BASOPHILS # BLD AUTO: 0.07 10*3/MM3 (ref 0–0.2)
BASOPHILS NFR BLD AUTO: 0.7 % (ref 0–1.5)
BILIRUB SERPL-MCNC: 0.5 MG/DL (ref 0–1.2)
BUN SERPL-MCNC: 35 MG/DL (ref 6–20)
BUN/CREAT SERPL: 26.9 (ref 7–25)
CALCIUM SPEC-SCNC: 9.5 MG/DL (ref 8.6–10.5)
CHLORIDE SERPL-SCNC: 99 MMOL/L (ref 98–107)
CO2 SERPL-SCNC: 26.5 MMOL/L (ref 22–29)
CREAT SERPL-MCNC: 1.3 MG/DL (ref 0.76–1.27)
DEPRECATED RDW RBC AUTO: 45 FL (ref 37–54)
EGFRCR SERPLBLD CKD-EPI 2021: 63.7 ML/MIN/1.73
EOSINOPHIL # BLD AUTO: 0.42 10*3/MM3 (ref 0–0.4)
EOSINOPHIL NFR BLD AUTO: 4.2 % (ref 0.3–6.2)
ERYTHROCYTE [DISTWIDTH] IN BLOOD BY AUTOMATED COUNT: 13.9 % (ref 12.3–15.4)
GEN 5 1HR TROPONIN T REFLEX: 9 NG/L
GLOBULIN UR ELPH-MCNC: 2.7 GM/DL
GLUCOSE SERPL-MCNC: 144 MG/DL (ref 65–99)
HCT VFR BLD AUTO: 45.8 % (ref 37.5–51)
HGB BLD-MCNC: 15.1 G/DL (ref 13–17.7)
HOLD SPECIMEN: NORMAL
HOLD SPECIMEN: NORMAL
IMM GRANULOCYTES # BLD AUTO: 0.03 10*3/MM3 (ref 0–0.05)
IMM GRANULOCYTES NFR BLD AUTO: 0.3 % (ref 0–0.5)
LYMPHOCYTES # BLD AUTO: 3.08 10*3/MM3 (ref 0.7–3.1)
LYMPHOCYTES NFR BLD AUTO: 30.6 % (ref 19.6–45.3)
MCH RBC QN AUTO: 29.3 PG (ref 26.6–33)
MCHC RBC AUTO-ENTMCNC: 33 G/DL (ref 31.5–35.7)
MCV RBC AUTO: 88.8 FL (ref 79–97)
MONOCYTES # BLD AUTO: 0.93 10*3/MM3 (ref 0.1–0.9)
MONOCYTES NFR BLD AUTO: 9.3 % (ref 5–12)
NEUTROPHILS NFR BLD AUTO: 5.52 10*3/MM3 (ref 1.7–7)
NEUTROPHILS NFR BLD AUTO: 54.9 % (ref 42.7–76)
NRBC BLD AUTO-RTO: 0 /100 WBC (ref 0–0.2)
NT-PROBNP SERPL-MCNC: 274 PG/ML (ref 0–900)
PLATELET # BLD AUTO: 321 10*3/MM3 (ref 140–450)
PMV BLD AUTO: 9.8 FL (ref 6–12)
POTASSIUM SERPL-SCNC: 3.6 MMOL/L (ref 3.5–5.2)
PROT SERPL-MCNC: 6.9 G/DL (ref 6–8.5)
QT INTERVAL: 314 MS
QT INTERVAL: 467 MS
QTC INTERVAL: 460 MS
QTC INTERVAL: 500 MS
RBC # BLD AUTO: 5.16 10*6/MM3 (ref 4.14–5.8)
SODIUM SERPL-SCNC: 139 MMOL/L (ref 136–145)
TROPONIN T NUMERIC DELTA: 0 NG/L
TROPONIN T SERPL HS-MCNC: 9 NG/L
TSH SERPL DL<=0.05 MIU/L-ACNC: 0.56 UIU/ML (ref 0.27–4.2)
WBC NRBC COR # BLD AUTO: 10.05 10*3/MM3 (ref 3.4–10.8)
WHOLE BLOOD HOLD COAG: NORMAL
WHOLE BLOOD HOLD SPECIMEN: NORMAL

## 2025-08-03 PROCEDURE — 96366 THER/PROPH/DIAG IV INF ADDON: CPT

## 2025-08-03 PROCEDURE — 93010 ELECTROCARDIOGRAM REPORT: CPT | Performed by: INTERNAL MEDICINE

## 2025-08-03 PROCEDURE — G0378 HOSPITAL OBSERVATION PER HR: HCPCS

## 2025-08-03 PROCEDURE — 80053 COMPREHEN METABOLIC PANEL: CPT | Performed by: EMERGENCY MEDICINE

## 2025-08-03 PROCEDURE — 93005 ELECTROCARDIOGRAM TRACING: CPT | Performed by: STUDENT IN AN ORGANIZED HEALTH CARE EDUCATION/TRAINING PROGRAM

## 2025-08-03 PROCEDURE — 84484 ASSAY OF TROPONIN QUANT: CPT | Performed by: EMERGENCY MEDICINE

## 2025-08-03 PROCEDURE — 25810000003 LACTATED RINGERS SOLUTION: Performed by: EMERGENCY MEDICINE

## 2025-08-03 PROCEDURE — 36415 COLL VENOUS BLD VENIPUNCTURE: CPT

## 2025-08-03 PROCEDURE — 71045 X-RAY EXAM CHEST 1 VIEW: CPT

## 2025-08-03 PROCEDURE — 96365 THER/PROPH/DIAG IV INF INIT: CPT

## 2025-08-03 PROCEDURE — 96376 TX/PRO/DX INJ SAME DRUG ADON: CPT

## 2025-08-03 PROCEDURE — 85025 COMPLETE CBC W/AUTO DIFF WBC: CPT | Performed by: EMERGENCY MEDICINE

## 2025-08-03 PROCEDURE — 25010000002 DILTIAZEM 25 MG/5ML SOLUTION: Performed by: EMERGENCY MEDICINE

## 2025-08-03 PROCEDURE — 25810000003 SODIUM CHLORIDE 0.9 % SOLUTION: Performed by: STUDENT IN AN ORGANIZED HEALTH CARE EDUCATION/TRAINING PROGRAM

## 2025-08-03 PROCEDURE — 99285 EMERGENCY DEPT VISIT HI MDM: CPT

## 2025-08-03 PROCEDURE — 83880 ASSAY OF NATRIURETIC PEPTIDE: CPT | Performed by: STUDENT IN AN ORGANIZED HEALTH CARE EDUCATION/TRAINING PROGRAM

## 2025-08-03 PROCEDURE — 25010000002: Performed by: EMERGENCY MEDICINE

## 2025-08-03 PROCEDURE — 93005 ELECTROCARDIOGRAM TRACING: CPT

## 2025-08-03 PROCEDURE — 99214 OFFICE O/P EST MOD 30 MIN: CPT | Performed by: NURSE PRACTITIONER

## 2025-08-03 PROCEDURE — 93005 ELECTROCARDIOGRAM TRACING: CPT | Performed by: EMERGENCY MEDICINE

## 2025-08-03 PROCEDURE — 84443 ASSAY THYROID STIM HORMONE: CPT | Performed by: STUDENT IN AN ORGANIZED HEALTH CARE EDUCATION/TRAINING PROGRAM

## 2025-08-03 RX ORDER — AMOXICILLIN 250 MG
2 CAPSULE ORAL 2 TIMES DAILY PRN
Status: DISCONTINUED | OUTPATIENT
Start: 2025-08-03 | End: 2025-08-03 | Stop reason: HOSPADM

## 2025-08-03 RX ORDER — SODIUM CHLORIDE 9 MG/ML
75 INJECTION, SOLUTION INTRAVENOUS CONTINUOUS
Status: DISCONTINUED | OUTPATIENT
Start: 2025-08-03 | End: 2025-08-03 | Stop reason: HOSPADM

## 2025-08-03 RX ORDER — MIDAZOLAM HYDROCHLORIDE 1 MG/ML
2 INJECTION, SOLUTION INTRAMUSCULAR; INTRAVENOUS
Status: DISCONTINUED | OUTPATIENT
Start: 2025-08-03 | End: 2025-08-03 | Stop reason: HOSPADM

## 2025-08-03 RX ORDER — MIDAZOLAM HYDROCHLORIDE 5 MG/ML
4 INJECTION, SOLUTION INTRAMUSCULAR; INTRAVENOUS
Status: DISCONTINUED | OUTPATIENT
Start: 2025-08-03 | End: 2025-08-03 | Stop reason: HOSPADM

## 2025-08-03 RX ORDER — BISACODYL 5 MG/1
5 TABLET, DELAYED RELEASE ORAL DAILY PRN
Status: DISCONTINUED | OUTPATIENT
Start: 2025-08-03 | End: 2025-08-03 | Stop reason: HOSPADM

## 2025-08-03 RX ORDER — BISACODYL 10 MG
10 SUPPOSITORY, RECTAL RECTAL DAILY PRN
Status: DISCONTINUED | OUTPATIENT
Start: 2025-08-03 | End: 2025-08-03 | Stop reason: HOSPADM

## 2025-08-03 RX ORDER — DILTIAZEM HYDROCHLORIDE 5 MG/ML
10 INJECTION INTRAVENOUS ONCE
Status: COMPLETED | OUTPATIENT
Start: 2025-08-03 | End: 2025-08-03

## 2025-08-03 RX ORDER — SODIUM CHLORIDE 0.9 % (FLUSH) 0.9 %
10 SYRINGE (ML) INJECTION AS NEEDED
Status: DISCONTINUED | OUTPATIENT
Start: 2025-08-03 | End: 2025-08-03 | Stop reason: HOSPADM

## 2025-08-03 RX ORDER — SODIUM CHLORIDE 0.9 % (FLUSH) 0.9 %
10 SYRINGE (ML) INJECTION EVERY 12 HOURS SCHEDULED
Status: DISCONTINUED | OUTPATIENT
Start: 2025-08-03 | End: 2025-08-03 | Stop reason: HOSPADM

## 2025-08-03 RX ORDER — MIDAZOLAM HYDROCHLORIDE 1 MG/ML
4 INJECTION, SOLUTION INTRAMUSCULAR; INTRAVENOUS
Status: DISCONTINUED | OUTPATIENT
Start: 2025-08-03 | End: 2025-08-03 | Stop reason: HOSPADM

## 2025-08-03 RX ORDER — FOLIC ACID 1 MG/1
1 TABLET ORAL DAILY
Status: DISCONTINUED | OUTPATIENT
Start: 2025-08-03 | End: 2025-08-03 | Stop reason: HOSPADM

## 2025-08-03 RX ORDER — ASPIRIN 325 MG
325 TABLET ORAL ONCE
Status: COMPLETED | OUTPATIENT
Start: 2025-08-03 | End: 2025-08-03

## 2025-08-03 RX ORDER — LORAZEPAM 1 MG/1
2 TABLET ORAL
Status: DISCONTINUED | OUTPATIENT
Start: 2025-08-03 | End: 2025-08-03 | Stop reason: HOSPADM

## 2025-08-03 RX ORDER — POLYETHYLENE GLYCOL 3350 17 G/17G
17 POWDER, FOR SOLUTION ORAL DAILY PRN
Status: DISCONTINUED | OUTPATIENT
Start: 2025-08-03 | End: 2025-08-03 | Stop reason: HOSPADM

## 2025-08-03 RX ORDER — POTASSIUM CHLORIDE 1500 MG/1
40 TABLET, EXTENDED RELEASE ORAL EVERY 4 HOURS
Status: COMPLETED | OUTPATIENT
Start: 2025-08-03 | End: 2025-08-03

## 2025-08-03 RX ORDER — NITROGLYCERIN 0.4 MG/1
0.4 TABLET SUBLINGUAL
Status: DISCONTINUED | OUTPATIENT
Start: 2025-08-03 | End: 2025-08-03 | Stop reason: HOSPADM

## 2025-08-03 RX ORDER — METOPROLOL TARTRATE 50 MG
50 TABLET ORAL ONCE
Status: COMPLETED | OUTPATIENT
Start: 2025-08-03 | End: 2025-08-03

## 2025-08-03 RX ORDER — SODIUM CHLORIDE 9 MG/ML
40 INJECTION, SOLUTION INTRAVENOUS AS NEEDED
Status: DISCONTINUED | OUTPATIENT
Start: 2025-08-03 | End: 2025-08-03 | Stop reason: HOSPADM

## 2025-08-03 RX ORDER — PANTOPRAZOLE SODIUM 40 MG/1
40 TABLET, DELAYED RELEASE ORAL
Status: DISCONTINUED | OUTPATIENT
Start: 2025-08-03 | End: 2025-08-03 | Stop reason: HOSPADM

## 2025-08-03 RX ORDER — LORAZEPAM 1 MG/1
1 TABLET ORAL
Status: DISCONTINUED | OUTPATIENT
Start: 2025-08-03 | End: 2025-08-03 | Stop reason: HOSPADM

## 2025-08-03 RX ORDER — ATORVASTATIN CALCIUM 20 MG/1
20 TABLET, FILM COATED ORAL NIGHTLY
Status: DISCONTINUED | OUTPATIENT
Start: 2025-08-03 | End: 2025-08-03 | Stop reason: HOSPADM

## 2025-08-03 RX ADMIN — SODIUM CHLORIDE 75 ML/HR: 9 INJECTION, SOLUTION INTRAVENOUS at 08:05

## 2025-08-03 RX ADMIN — DILTIAZEM HYDROCHLORIDE 10 MG: 5 INJECTION, SOLUTION INTRAVENOUS at 05:06

## 2025-08-03 RX ADMIN — APIXABAN 5 MG: 5 TABLET, FILM COATED ORAL at 09:21

## 2025-08-03 RX ADMIN — POTASSIUM CHLORIDE 40 MEQ: 1500 TABLET, EXTENDED RELEASE ORAL at 13:01

## 2025-08-03 RX ADMIN — ASPIRIN 325 MG ORAL TABLET 325 MG: 325 PILL ORAL at 05:22

## 2025-08-03 RX ADMIN — PANTOPRAZOLE SODIUM 40 MG: 40 TABLET, DELAYED RELEASE ORAL at 09:21

## 2025-08-03 RX ADMIN — METOPROLOL TARTRATE 50 MG: 50 TABLET, FILM COATED ORAL at 06:20

## 2025-08-03 RX ADMIN — Medication 100 MG: at 09:21

## 2025-08-03 RX ADMIN — FOLIC ACID 1 MG: 1 TABLET ORAL at 09:21

## 2025-08-03 RX ADMIN — SODIUM CHLORIDE, POTASSIUM CHLORIDE, SODIUM LACTATE AND CALCIUM CHLORIDE 1000 ML: 600; 310; 30; 20 INJECTION, SOLUTION INTRAVENOUS at 05:00

## 2025-08-03 RX ADMIN — SODIUM CHLORIDE 5 MG/HR: 900 INJECTION, SOLUTION INTRAVENOUS at 05:58

## 2025-08-03 RX ADMIN — Medication 10 ML: at 09:21

## 2025-08-03 RX ADMIN — POTASSIUM CHLORIDE 40 MEQ: 1500 TABLET, EXTENDED RELEASE ORAL at 09:21

## 2025-08-03 NOTE — PROGRESS NOTES
Mr. Coreas paged the cardiology answering service. He woke up in afib with . BP low systolic 99. He is aware of the abnormal heartbeat. He has chronic lightheadedness. He recently had a watchman device.     I advised that he go to the ED with his rapid heartbeat and low BP.

## 2025-08-04 ENCOUNTER — READMISSION MANAGEMENT (OUTPATIENT)
Dept: CALL CENTER | Facility: HOSPITAL | Age: 60
End: 2025-08-04
Payer: COMMERCIAL

## 2025-08-04 ENCOUNTER — TRANSITIONAL CARE MANAGEMENT TELEPHONE ENCOUNTER (OUTPATIENT)
Dept: CALL CENTER | Facility: HOSPITAL | Age: 60
End: 2025-08-04
Payer: COMMERCIAL

## 2025-08-15 ENCOUNTER — TELEPHONE (OUTPATIENT)
Dept: NEUROLOGY | Facility: CLINIC | Age: 60
End: 2025-08-15
Payer: COMMERCIAL

## 2025-08-29 ENCOUNTER — HOSPITAL ENCOUNTER (OUTPATIENT)
Dept: CARDIOLOGY | Facility: HOSPITAL | Age: 60
Discharge: HOME | End: 2025-08-29
Admitting: INTERNAL MEDICINE
Payer: COMMERCIAL

## 2025-08-29 VITALS
HEART RATE: 66 BPM | SYSTOLIC BLOOD PRESSURE: 136 MMHG | BODY MASS INDEX: 26.95 KG/M2 | DIASTOLIC BLOOD PRESSURE: 75 MMHG | HEIGHT: 74 IN | WEIGHT: 210 LBS | OXYGEN SATURATION: 94 % | RESPIRATION RATE: 16 BRPM

## 2025-08-29 DIAGNOSIS — Z95.818 PRESENCE OF WATCHMAN LEFT ATRIAL APPENDAGE CLOSURE DEVICE: ICD-10-CM

## 2025-08-29 DIAGNOSIS — I48.0 PAF (PAROXYSMAL ATRIAL FIBRILLATION): ICD-10-CM

## 2025-08-29 DIAGNOSIS — R29.6 RECURRENT FALLS: ICD-10-CM

## 2025-08-29 PROCEDURE — 25010000002 MIDAZOLAM PER 1 MG: Performed by: INTERNAL MEDICINE

## 2025-08-29 PROCEDURE — 93325 DOPPLER ECHO COLOR FLOW MAPG: CPT

## 2025-08-29 PROCEDURE — 25010000002 FENTANYL CITRATE (PF) 50 MCG/ML SOLUTION: Performed by: INTERNAL MEDICINE

## 2025-08-29 PROCEDURE — 93320 DOPPLER ECHO COMPLETE: CPT

## 2025-08-29 RX ORDER — SODIUM CHLORIDE 9 MG/ML
INJECTION, SOLUTION INTRAVENOUS
Status: COMPLETED | OUTPATIENT
Start: 2025-08-29 | End: 2025-08-29

## 2025-08-29 RX ORDER — MIDAZOLAM HYDROCHLORIDE 1 MG/ML
INJECTION, SOLUTION INTRAMUSCULAR; INTRAVENOUS
Status: COMPLETED | OUTPATIENT
Start: 2025-08-29 | End: 2025-08-29

## 2025-08-29 RX ORDER — CLOPIDOGREL BISULFATE 75 MG/1
75 TABLET ORAL DAILY
Qty: 90 TABLET | Refills: 1 | Status: SHIPPED | OUTPATIENT
Start: 2025-08-29

## 2025-08-29 RX ORDER — FENTANYL CITRATE 50 UG/ML
INJECTION, SOLUTION INTRAMUSCULAR; INTRAVENOUS
Status: COMPLETED | OUTPATIENT
Start: 2025-08-29 | End: 2025-08-29

## 2025-08-29 RX ORDER — LIDOCAINE HYDROCHLORIDE 20 MG/ML
SOLUTION OROPHARYNGEAL
Status: COMPLETED | OUTPATIENT
Start: 2025-08-29 | End: 2025-08-29

## 2025-08-29 RX ADMIN — FENTANYL CITRATE 25 MCG: 50 INJECTION INTRAMUSCULAR; INTRAVENOUS at 11:32

## 2025-08-29 RX ADMIN — SODIUM CHLORIDE 50 ML/HR: 9 INJECTION, SOLUTION INTRAVENOUS at 11:13

## 2025-08-29 RX ADMIN — MIDAZOLAM 2 MG: 1 INJECTION INTRAMUSCULAR; INTRAVENOUS at 11:29

## 2025-08-29 RX ADMIN — MIDAZOLAM 1 MG: 1 INJECTION INTRAMUSCULAR; INTRAVENOUS at 11:34

## 2025-08-29 RX ADMIN — LIDOCAINE HYDROCHLORIDE 10 ML: 20 SOLUTION ORAL at 11:12

## 2025-08-29 RX ADMIN — FENTANYL CITRATE 25 MCG: 50 INJECTION INTRAMUSCULAR; INTRAVENOUS at 11:28

## 2025-08-29 RX ADMIN — MIDAZOLAM 1 MG: 1 INJECTION INTRAMUSCULAR; INTRAVENOUS at 11:32

## 2025-08-29 RX ADMIN — MIDAZOLAM 2 MG: 1 INJECTION INTRAMUSCULAR; INTRAVENOUS at 11:27

## 2025-08-29 RX ADMIN — MIDAZOLAM 1 MG: 1 INJECTION INTRAMUSCULAR; INTRAVENOUS at 11:38

## 2025-08-29 RX ADMIN — FENTANYL CITRATE 25 MCG: 50 INJECTION INTRAMUSCULAR; INTRAVENOUS at 11:29

## (undated) DEVICE — Device

## (undated) DEVICE — KT ORCA ORCAPOD DISP STRL

## (undated) DEVICE — ADAPT CLN BIOGUARD AIR/H2O DISP

## (undated) DEVICE — HI-TORQUE SUPRA CORE .035 PERIPHERAL GUIDE WIRE .035 X 190 CM: Brand: HI-TORQUE SUPRA CORE

## (undated) DEVICE — DGW .035 FC J3MM 260CM TEF: Brand: EMERALD

## (undated) DEVICE — SENSR O2 OXIMAX FNGR A/ 18IN NONSTR

## (undated) DEVICE — LAAC ACCESS SOLUTION: Brand: VERSACROSS CONNECT™ LAAC ACCESS SOLUTION

## (undated) DEVICE — LN SMPL CO2 SHTRM SD STREAM W/M LUER

## (undated) DEVICE — PINNACLE INTRODUCER SHEATH: Brand: PINNACLE

## (undated) DEVICE — INTRO SHEATH ART/FEM ENGAGE .035 4F12CM

## (undated) DEVICE — SNAR POLYP SENSATION STDOVL 27 240 BX40

## (undated) DEVICE — BITEBLOCK OMNI BLOC

## (undated) DEVICE — MSK PROC CURAPLEX O2 2/ADAPT 7FT

## (undated) DEVICE — DIL VESL 14F.038 20CM

## (undated) DEVICE — CANN O2 ETCO2 FITS ALL CONN CO2 SMPL A/ 7IN DISP LF

## (undated) DEVICE — CATH DIAG IMPULSE PIG 5F 100CM

## (undated) DEVICE — TBG SXN CONN/F UNIV 1/4IN 10FT LF STRL

## (undated) DEVICE — FRCP BX RADJAW4 NDL 2.8 240CM LG OG BX40

## (undated) DEVICE — PK CATH CARD 40

## (undated) DEVICE — ACCESS SHEATH WITH DILATOR: Brand: WATCHMAN FXD CURVE™ ACCESS SYSTEM

## (undated) DEVICE — TUBING, SUCTION, 1/4" X 10', STRAIGHT: Brand: MEDLINE

## (undated) DEVICE — THE SINGLE USE ETRAP – POLYP TRAP IS USED FOR SUCTION RETRIEVAL OF ENDOSCOPICALLY REMOVED POLYPS.: Brand: ETRAP

## (undated) DEVICE — KT MANIFLD CARDIAC

## (undated) DEVICE — INTRO PERFORMER CHECKFLO/LG RAD/BND NO/GW 16F .038IN 30CM

## (undated) DEVICE — BLCK/BITE BLOX W/DENTL/RIM W/STRAP 54F

## (undated) DEVICE — PERCLOSE™ PROSTYLE™ SUTURE-MEDIATED CLOSURE AND REPAIR SYSTEM: Brand: PERCLOSE™ PROSTYLE™

## (undated) DEVICE — MSK ENDO PORT O2 POM ELITE CURAPLEX A/